# Patient Record
Sex: FEMALE | Race: WHITE | NOT HISPANIC OR LATINO | Employment: OTHER | URBAN - METROPOLITAN AREA
[De-identification: names, ages, dates, MRNs, and addresses within clinical notes are randomized per-mention and may not be internally consistent; named-entity substitution may affect disease eponyms.]

---

## 2018-08-22 ENCOUNTER — TRANSCRIBE ORDERS (OUTPATIENT)
Dept: ADMINISTRATIVE | Facility: HOSPITAL | Age: 73
End: 2018-08-22

## 2018-08-22 DIAGNOSIS — Z12.39 BREAST SCREENING: Primary | ICD-10-CM

## 2018-08-28 ENCOUNTER — HOSPITAL ENCOUNTER (OUTPATIENT)
Dept: MAMMOGRAPHY | Facility: HOSPITAL | Age: 73
Discharge: HOME | End: 2018-08-28
Admitting: GENERAL PRACTICE

## 2018-08-28 ENCOUNTER — HOSPITAL ENCOUNTER (OUTPATIENT)
Dept: BONE DENSITY | Facility: HOSPITAL | Age: 73
Discharge: HOME | End: 2018-08-28

## 2018-08-28 ENCOUNTER — TRANSCRIBE ORDERS (OUTPATIENT)
Dept: ADMINISTRATIVE | Facility: HOSPITAL | Age: 73
End: 2018-08-28

## 2018-08-28 DIAGNOSIS — Z78.0 POST-MENOPAUSAL: ICD-10-CM

## 2018-08-28 DIAGNOSIS — Z12.39 BREAST SCREENING: ICD-10-CM

## 2018-08-28 DIAGNOSIS — Z78.0 POST-MENOPAUSAL: Primary | ICD-10-CM

## 2018-08-28 PROCEDURE — 77067 SCR MAMMO BI INCL CAD: CPT

## 2018-08-28 PROCEDURE — 77080 DXA BONE DENSITY AXIAL: CPT

## 2019-05-14 ENCOUNTER — APPOINTMENT (OUTPATIENT)
Dept: GENERAL RADIOLOGY | Facility: HOSPITAL | Age: 74
End: 2019-05-14

## 2019-05-14 ENCOUNTER — APPOINTMENT (OUTPATIENT)
Dept: CT IMAGING | Facility: HOSPITAL | Age: 74
End: 2019-05-14

## 2019-05-14 ENCOUNTER — HOSPITAL ENCOUNTER (INPATIENT)
Facility: HOSPITAL | Age: 74
LOS: 4 days | Discharge: HOME | End: 2019-05-18
Attending: EMERGENCY MEDICINE | Admitting: HOSPITALIST

## 2019-05-14 ENCOUNTER — APPOINTMENT (OUTPATIENT)
Dept: MRI IMAGING | Facility: HOSPITAL | Age: 74
End: 2019-05-14

## 2019-05-14 DIAGNOSIS — I16.9 HYPERTENSIVE CRISIS: Primary | ICD-10-CM

## 2019-05-14 DIAGNOSIS — I63.9 ACUTE CEREBROVASCULAR ACCIDENT (CVA) OF CEREBELLUM (HCC): ICD-10-CM

## 2019-05-14 PROBLEM — R79.89 ELEVATED TROPONIN: Status: ACTIVE | Noted: 2019-05-14

## 2019-05-14 PROBLEM — E78.5 HYPERLIPIDEMIA: Status: ACTIVE | Noted: 2019-05-14

## 2019-05-14 PROBLEM — I10 HYPERTENSION: Status: ACTIVE | Noted: 2019-05-14

## 2019-05-14 PROBLEM — E11.9 DIABETES MELLITUS (HCC): Status: ACTIVE | Noted: 2019-05-14

## 2019-05-14 PROBLEM — R77.8 ELEVATED TROPONIN: Status: ACTIVE | Noted: 2019-05-14

## 2019-05-14 LAB
ALBUMIN SERPL-MCNC: 4 G/DL (ref 3.5–5.2)
ALBUMIN/GLOB SERPL: 1.3 G/DL
ALP SERPL-CCNC: 104 U/L (ref 39–117)
ALT SERPL W P-5'-P-CCNC: 14 U/L (ref 1–33)
ANION GAP SERPL CALCULATED.3IONS-SCNC: 8.3 MMOL/L
AST SERPL-CCNC: 17 U/L (ref 1–32)
BASOPHILS # BLD AUTO: 0.04 10*3/MM3 (ref 0–0.2)
BASOPHILS NFR BLD AUTO: 0.8 % (ref 0–1.5)
BILIRUB SERPL-MCNC: 0.3 MG/DL (ref 0.2–1.2)
BUN BLD-MCNC: 19 MG/DL (ref 8–23)
BUN/CREAT SERPL: 19.8 (ref 7–25)
CALCIUM SPEC-SCNC: 9.1 MG/DL (ref 8.6–10.5)
CHLORIDE SERPL-SCNC: 97 MMOL/L (ref 98–107)
CO2 SERPL-SCNC: 27.7 MMOL/L (ref 22–29)
CREAT BLD-MCNC: 0.96 MG/DL (ref 0.57–1)
DEPRECATED RDW RBC AUTO: 46.5 FL (ref 37–54)
EOSINOPHIL # BLD AUTO: 0.15 10*3/MM3 (ref 0–0.4)
EOSINOPHIL NFR BLD AUTO: 3 % (ref 0.3–6.2)
ERYTHROCYTE [DISTWIDTH] IN BLOOD BY AUTOMATED COUNT: 14.4 % (ref 12.3–15.4)
GFR SERPL CREATININE-BSD FRML MDRD: 57 ML/MIN/1.73
GLOBULIN UR ELPH-MCNC: 3 GM/DL
GLUCOSE BLD-MCNC: 116 MG/DL (ref 65–99)
HCT VFR BLD AUTO: 35.3 % (ref 34–46.6)
HGB BLD-MCNC: 11.2 G/DL (ref 12–15.9)
HOLD SPECIMEN: NORMAL
HOLD SPECIMEN: NORMAL
IMM GRANULOCYTES # BLD AUTO: 0.01 10*3/MM3 (ref 0–0.05)
IMM GRANULOCYTES NFR BLD AUTO: 0.2 % (ref 0–0.5)
LYMPHOCYTES # BLD AUTO: 1.28 10*3/MM3 (ref 0.7–3.1)
LYMPHOCYTES NFR BLD AUTO: 25.4 % (ref 19.6–45.3)
MCH RBC QN AUTO: 27.7 PG (ref 26.6–33)
MCHC RBC AUTO-ENTMCNC: 31.7 G/DL (ref 31.5–35.7)
MCV RBC AUTO: 87.2 FL (ref 79–97)
MONOCYTES # BLD AUTO: 0.41 10*3/MM3 (ref 0.1–0.9)
MONOCYTES NFR BLD AUTO: 8.2 % (ref 5–12)
NEUTROPHILS # BLD AUTO: 3.14 10*3/MM3 (ref 1.7–7)
NEUTROPHILS NFR BLD AUTO: 62.4 % (ref 42.7–76)
NRBC BLD AUTO-RTO: 0.2 /100 WBC (ref 0–0.2)
PLATELET # BLD AUTO: 240 10*3/MM3 (ref 140–450)
PMV BLD AUTO: 11 FL (ref 6–12)
POTASSIUM BLD-SCNC: 3.9 MMOL/L (ref 3.5–5.2)
PROT SERPL-MCNC: 7 G/DL (ref 6–8.5)
RBC # BLD AUTO: 4.05 10*6/MM3 (ref 3.77–5.28)
SODIUM BLD-SCNC: 133 MMOL/L (ref 136–145)
TROPONIN T SERPL-MCNC: 0.33 NG/ML (ref 0–0.03)
WBC NRBC COR # BLD: 5.03 10*3/MM3 (ref 3.4–10.8)
WHOLE BLOOD HOLD SPECIMEN: NORMAL
WHOLE BLOOD HOLD SPECIMEN: NORMAL

## 2019-05-14 PROCEDURE — 99285 EMERGENCY DEPT VISIT HI MDM: CPT

## 2019-05-14 PROCEDURE — 85025 COMPLETE CBC W/AUTO DIFF WBC: CPT | Performed by: PHYSICIAN ASSISTANT

## 2019-05-14 PROCEDURE — 93005 ELECTROCARDIOGRAM TRACING: CPT | Performed by: PHYSICIAN ASSISTANT

## 2019-05-14 PROCEDURE — 93010 ELECTROCARDIOGRAM REPORT: CPT | Performed by: INTERNAL MEDICINE

## 2019-05-14 PROCEDURE — 70553 MRI BRAIN STEM W/O & W/DYE: CPT

## 2019-05-14 PROCEDURE — A9577 INJ MULTIHANCE: HCPCS | Performed by: INTERNAL MEDICINE

## 2019-05-14 PROCEDURE — 70450 CT HEAD/BRAIN W/O DYE: CPT

## 2019-05-14 PROCEDURE — 80053 COMPREHEN METABOLIC PANEL: CPT | Performed by: PHYSICIAN ASSISTANT

## 2019-05-14 PROCEDURE — 82607 VITAMIN B-12: CPT | Performed by: NURSE PRACTITIONER

## 2019-05-14 PROCEDURE — 71045 X-RAY EXAM CHEST 1 VIEW: CPT

## 2019-05-14 PROCEDURE — 70549 MR ANGIOGRAPH NECK W/O&W/DYE: CPT

## 2019-05-14 PROCEDURE — 0 GADOBENATE DIMEGLUMINE 529 MG/ML SOLUTION: Performed by: INTERNAL MEDICINE

## 2019-05-14 PROCEDURE — 70544 MR ANGIOGRAPHY HEAD W/O DYE: CPT

## 2019-05-14 PROCEDURE — 84484 ASSAY OF TROPONIN QUANT: CPT | Performed by: PHYSICIAN ASSISTANT

## 2019-05-14 RX ORDER — LOSARTAN POTASSIUM AND HYDROCHLOROTHIAZIDE 12.5; 1 MG/1; MG/1
1 TABLET ORAL DAILY
COMMUNITY
End: 2019-05-18 | Stop reason: HOSPADM

## 2019-05-14 RX ORDER — ASPIRIN 325 MG
325 TABLET ORAL ONCE
Status: COMPLETED | OUTPATIENT
Start: 2019-05-14 | End: 2019-05-14

## 2019-05-14 RX ORDER — ATORVASTATIN CALCIUM 20 MG/1
20 TABLET, FILM COATED ORAL DAILY
COMMUNITY
End: 2019-05-18 | Stop reason: HOSPADM

## 2019-05-14 RX ORDER — ASPIRIN 325 MG
325 TABLET ORAL DAILY
Status: DISCONTINUED | OUTPATIENT
Start: 2019-05-15 | End: 2019-05-15

## 2019-05-14 RX ORDER — BRIMONIDINE TARTRATE 2 MG/ML
1 SOLUTION/ DROPS OPHTHALMIC 2 TIMES DAILY
COMMUNITY

## 2019-05-14 RX ORDER — ASPIRIN 300 MG/1
300 SUPPOSITORY RECTAL DAILY
Status: DISCONTINUED | OUTPATIENT
Start: 2019-05-15 | End: 2019-05-15

## 2019-05-14 RX ORDER — BRIMONIDINE TARTRATE 2 MG/ML
1 SOLUTION/ DROPS OPHTHALMIC 2 TIMES DAILY
Status: DISCONTINUED | OUTPATIENT
Start: 2019-05-14 | End: 2019-05-18 | Stop reason: HOSPADM

## 2019-05-14 RX ORDER — ONDANSETRON 2 MG/ML
4 INJECTION INTRAMUSCULAR; INTRAVENOUS EVERY 6 HOURS PRN
Status: DISCONTINUED | OUTPATIENT
Start: 2019-05-14 | End: 2019-05-18 | Stop reason: HOSPADM

## 2019-05-14 RX ORDER — DORZOLAMIDE HCL 20 MG/ML
1 SOLUTION/ DROPS OPHTHALMIC 2 TIMES DAILY
COMMUNITY

## 2019-05-14 RX ORDER — DORZOLAMIDE HCL 20 MG/ML
1 SOLUTION/ DROPS OPHTHALMIC 2 TIMES DAILY
Status: DISCONTINUED | OUTPATIENT
Start: 2019-05-14 | End: 2019-05-18 | Stop reason: HOSPADM

## 2019-05-14 RX ORDER — SODIUM CHLORIDE 0.9 % (FLUSH) 0.9 %
3 SYRINGE (ML) INJECTION EVERY 12 HOURS SCHEDULED
Status: DISCONTINUED | OUTPATIENT
Start: 2019-05-14 | End: 2019-05-18 | Stop reason: HOSPADM

## 2019-05-14 RX ORDER — SODIUM CHLORIDE 9 MG/ML
75 INJECTION, SOLUTION INTRAVENOUS CONTINUOUS
Status: DISCONTINUED | OUTPATIENT
Start: 2019-05-14 | End: 2019-05-18 | Stop reason: HOSPADM

## 2019-05-14 RX ORDER — NEBIVOLOL 20 MG/1
20 TABLET ORAL DAILY
COMMUNITY
End: 2019-08-29 | Stop reason: HOSPADM

## 2019-05-14 RX ORDER — NEBIVOLOL 10 MG/1
20 TABLET ORAL DAILY
Status: DISCONTINUED | OUTPATIENT
Start: 2019-05-14 | End: 2019-05-18 | Stop reason: HOSPADM

## 2019-05-14 RX ORDER — GLIPIZIDE 10 MG/1
10 TABLET ORAL
COMMUNITY
End: 2019-08-29 | Stop reason: HOSPADM

## 2019-05-14 RX ORDER — SODIUM CHLORIDE 0.9 % (FLUSH) 0.9 %
3-10 SYRINGE (ML) INJECTION AS NEEDED
Status: DISCONTINUED | OUTPATIENT
Start: 2019-05-14 | End: 2019-05-18 | Stop reason: HOSPADM

## 2019-05-14 RX ORDER — ATORVASTATIN CALCIUM 20 MG/1
20 TABLET, FILM COATED ORAL NIGHTLY
Status: DISCONTINUED | OUTPATIENT
Start: 2019-05-14 | End: 2019-05-16

## 2019-05-14 RX ORDER — PIOGLITAZONEHYDROCHLORIDE 45 MG/1
45 TABLET ORAL DAILY
COMMUNITY
End: 2019-05-22 | Stop reason: HOSPADM

## 2019-05-14 RX ORDER — NEBIVOLOL 10 MG/1
10 TABLET ORAL DAILY
COMMUNITY
End: 2019-05-14

## 2019-05-14 RX ORDER — LOSARTAN POTASSIUM 25 MG/1
12.5 TABLET ORAL DAILY
COMMUNITY
End: 2019-05-14

## 2019-05-14 RX ADMIN — SODIUM CHLORIDE, PRESERVATIVE FREE 3 ML: 5 INJECTION INTRAVENOUS at 23:29

## 2019-05-14 RX ADMIN — SODIUM CHLORIDE 75 ML/HR: 9 INJECTION, SOLUTION INTRAVENOUS at 23:02

## 2019-05-14 RX ADMIN — ASPIRIN 325 MG: 325 TABLET ORAL at 18:23

## 2019-05-14 RX ADMIN — SODIUM CHLORIDE 5 MG/HR: 9 INJECTION, SOLUTION INTRAVENOUS at 18:46

## 2019-05-14 RX ADMIN — BRIMONIDINE TARTRATE 1 DROP: 2 SOLUTION OPHTHALMIC at 23:04

## 2019-05-14 RX ADMIN — DORZOLAMIDE HYDROCHLORIDE 1 DROP: 20 SOLUTION/ DROPS OPHTHALMIC at 23:04

## 2019-05-14 RX ADMIN — GADOBENATE DIMEGLUMINE 13 ML: 529 INJECTION, SOLUTION INTRAVENOUS at 22:34

## 2019-05-14 RX ADMIN — SODIUM CHLORIDE 5 MG/HR: 9 INJECTION, SOLUTION INTRAVENOUS at 23:02

## 2019-05-14 RX ADMIN — ATORVASTATIN CALCIUM 20 MG: 20 TABLET, FILM COATED ORAL at 23:04

## 2019-05-14 NOTE — PROGRESS NOTES
Clinical Pharmacy Services: Medication History    Landy Workman is a 74 y.o. female presenting to [facility] for   Chief Complaint   Patient presents with   • Blurred Vision       She  has a past medical history of Diabetes mellitus (CMS/[facility]), Hyperlipidemia, and Hypertension.    Allergies as of 05/14/2019   • (No Known Allergies)       Medication information was obtained from: Patient, pharmacy  Pharmacy and Phone Number: [facility] [phone]    Prior to Admission Medications     Prescriptions Last Dose Informant Patient Reported? Taking?    atorvastatin (LIPITOR) 20 MG tablet  Pharmacy Yes Yes    Take 20 mg by mouth Daily.    brimonidine (ALPHAGAN) 0.2 % ophthalmic solution  Pharmacy Yes Yes    Administer 1 drop to both eyes 2 (Two) Times a Day.    dorzolamide (TRUSOPT) 2 % ophthalmic solution  Pharmacy Yes Yes    Administer 1 drop to both eyes 2 (Two) Times a Day.    glipiZIDE (GLUCOTROL) 10 MG tablet  Pharmacy Yes Yes    Take 10 mg by mouth 2 (Two) Times a Day Before Meals.    losartan-hydrochlorothiazide (HYZAAR) 100-12.5 MG per tablet  Pharmacy Yes Yes    Take 1 tablet by mouth Daily.    metFORMIN (GLUCOPHAGE) 1000 MG tablet  Pharmacy Yes Yes    Take 1,000 mg by mouth 2 (Two) Times a Day With Meals.    nebivolol (BYSTOLIC) 20 MG tablet  Pharmacy Yes Yes    Take 20 mg by mouth Daily.    pioglitazone (ACTOS) 45 MG tablet  Pharmacy Yes Yes    Take 45 mg by mouth Daily.            Medication notes: Dorzolamide and Brimonidine added per patient and pharmacy records    This medication list is complete to the best of my knowledge as of 5/14/2019    Please call if questions.    Mee Montes, Medication History Technician  5/14/2019 6:30 PM

## 2019-05-14 NOTE — ED PROVIDER NOTES
"EMERGENCY DEPARTMENT ENCOUNTER    Room Number:  26/26  Date seen:  5/14/2019  Time seen: 4:51 PM  PCP: Braulio Beyer MD    HPI:  Chief complaint: blurred vision in L eye   Context:Landy Workman is a 74 y.o. female who presents to the ED with c/o blurred vision in the L eye that began Saturday. Pt reports she was reading the newspaper and she could not read it clearly. Pt states \"there is a blob in my L eye\". No R visual changes. Pt reports she went to her ophthalmologist today and was sent for further evaluation due to HTN and having had \"a stroke in my eye\".  Pt denies CP and SOA. Pt has had surgeries in the past on eyes bilaterally. No prior hx of stroke. Hx of HTN. There are no other complaints at this time.     Onset: gradual  Location:L eye   Radiation: none   Duration: 3 days   Timing: constant   Character: \"there is a blob in my L eye\"  Aggravating Factors: reading   Alleviating Factors: none mentioned   Severity: moderate     ALLERGIES  Patient has no known allergies.    PAST MEDICAL HISTORY  Active Ambulatory Problems     Diagnosis Date Noted   • No Active Ambulatory Problems     Resolved Ambulatory Problems     Diagnosis Date Noted   • No Resolved Ambulatory Problems     Past Medical History:   Diagnosis Date   • Diabetes mellitus (CMS/HCC)    • Hyperlipidemia    • Hypertension        PAST SURGICAL HISTORY  History reviewed. No pertinent surgical history.    FAMILY HISTORY  Family History   Problem Relation Age of Onset   • Breast cancer Neg Hx        SOCIAL HISTORY  Social History     Socioeconomic History   • Marital status:      Spouse name: Not on file   • Number of children: Not on file   • Years of education: Not on file   • Highest education level: Not on file   Tobacco Use   • Smoking status: Never Smoker   • Smokeless tobacco: Never Used   Substance and Sexual Activity   • Alcohol use: No     Frequency: Never   • Drug use: No       REVIEW OF SYSTEMS  Review of Systems "   Constitutional: Negative for chills and fever.   HENT: Negative.    Eyes: Positive for visual disturbance (blurred vision in L eye).   Respiratory: Negative for shortness of breath.    Cardiovascular: Negative for chest pain.   Gastrointestinal: Negative for abdominal pain.   Genitourinary: Negative.    Musculoskeletal: Negative.    Skin: Negative.    Neurological: Negative.    Psychiatric/Behavioral: Negative.        PHYSICAL EXAM  ED Triage Vitals   Temp Heart Rate Resp BP SpO2   05/14/19 1635 05/14/19 1635 05/14/19 1635 05/14/19 1648 05/14/19 1635   98.4 °F (36.9 °C) 74 16 (!) 225/102 99 %      Temp src Heart Rate Source Patient Position BP Location FiO2 (%)   05/14/19 1635 -- -- -- --   Tympanic         Physical Exam   Constitutional: She is oriented to person, place, and time and well-developed, well-nourished, and in no distress.   HENT:   Head: Normocephalic and atraumatic.   Right Ear: External ear normal.   Left Ear: External ear normal.   Nose: Nose normal.   Eyes: Conjunctivae are normal.   Post surgical changes to bilateral eyes   Neck: Normal range of motion.   Cardiovascular: Normal rate and regular rhythm.   Pulmonary/Chest: Effort normal and breath sounds normal.   Abdominal: Soft. There is no tenderness. There is no rebound and no guarding.   Normal bowel sounds  Soft  Nontender  Nondistended  No rebound, guarding, or rigidity  No appreciable organomegaly  No CVA tenderness   Musculoskeletal: Normal range of motion.   Neurological: She is alert and oriented to person, place, and time.   GCS is 15  Cranial nerves II-XII are intact.  No facial droop. Uvula is midline. No tongue deviation. PERRL and EOMI. There is no dysarthria, aphasia or slurred speech.  Sensation is intact to light touch bilaterally and is symmetrical in the face, BUE and BLE.  Strength is 5/5 and symmetrical in the BUE and BLE.  Finger to nose, heel to shin, and rapid alternating movements are intact.  Gait is steady.   Skin:  Skin is warm and dry.   Psychiatric: Affect normal.   Nursing note and vitals reviewed.      LAB RESULTS  Recent Results (from the past 24 hour(s))   Light Blue Top    Collection Time: 05/14/19  5:01 PM   Result Value Ref Range    Extra Tube hold for add-on    Green Top (Gel)    Collection Time: 05/14/19  5:01 PM   Result Value Ref Range    Extra Tube Hold for add-ons.    Lavender Top    Collection Time: 05/14/19  5:01 PM   Result Value Ref Range    Extra Tube hold for add-on    Gold Top - SST    Collection Time: 05/14/19  5:01 PM   Result Value Ref Range    Extra Tube Hold for add-ons.    Comprehensive Metabolic Panel    Collection Time: 05/14/19  5:01 PM   Result Value Ref Range    Glucose 116 (H) 65 - 99 mg/dL    BUN 19 8 - 23 mg/dL    Creatinine 0.96 0.57 - 1.00 mg/dL    Sodium 133 (L) 136 - 145 mmol/L    Potassium 3.9 3.5 - 5.2 mmol/L    Chloride 97 (L) 98 - 107 mmol/L    CO2 27.7 22.0 - 29.0 mmol/L    Calcium 9.1 8.6 - 10.5 mg/dL    Total Protein 7.0 6.0 - 8.5 g/dL    Albumin 4.00 3.50 - 5.20 g/dL    ALT (SGPT) 14 1 - 33 U/L    AST (SGOT) 17 1 - 32 U/L    Alkaline Phosphatase 104 39 - 117 U/L    Total Bilirubin 0.3 0.2 - 1.2 mg/dL    eGFR Non African Amer 57 (L) >60 mL/min/1.73    Globulin 3.0 gm/dL    A/G Ratio 1.3 g/dL    BUN/Creatinine Ratio 19.8 7.0 - 25.0    Anion Gap 8.3 mmol/L   Troponin    Collection Time: 05/14/19  5:01 PM   Result Value Ref Range    Troponin T 0.330 (C) 0.000 - 0.030 ng/mL   CBC Auto Differential    Collection Time: 05/14/19  5:01 PM   Result Value Ref Range    WBC 5.03 3.40 - 10.80 10*3/mm3    RBC 4.05 3.77 - 5.28 10*6/mm3    Hemoglobin 11.2 (L) 12.0 - 15.9 g/dL    Hematocrit 35.3 34.0 - 46.6 %    MCV 87.2 79.0 - 97.0 fL    MCH 27.7 26.6 - 33.0 pg    MCHC 31.7 31.5 - 35.7 g/dL    RDW 14.4 12.3 - 15.4 %    RDW-SD 46.5 37.0 - 54.0 fl    MPV 11.0 6.0 - 12.0 fL    Platelets 240 140 - 450 10*3/mm3    Neutrophil % 62.4 42.7 - 76.0 %    Lymphocyte % 25.4 19.6 - 45.3 %    Monocyte % 8.2 5.0  - 12.0 %    Eosinophil % 3.0 0.3 - 6.2 %    Basophil % 0.8 0.0 - 1.5 %    Immature Grans % 0.2 0.0 - 0.5 %    Neutrophils, Absolute 3.14 1.70 - 7.00 10*3/mm3    Lymphocytes, Absolute 1.28 0.70 - 3.10 10*3/mm3    Monocytes, Absolute 0.41 0.10 - 0.90 10*3/mm3    Eosinophils, Absolute 0.15 0.00 - 0.40 10*3/mm3    Basophils, Absolute 0.04 0.00 - 0.20 10*3/mm3    Immature Grans, Absolute 0.01 0.00 - 0.05 10*3/mm3    nRBC 0.2 0.0 - 0.2 /100 WBC       I ordered the above labs and reviewed the results    RADIOLOGY  XR Chest 1 View         CT Head Without Contrast   Preliminary Result   1. Evidence of old ischemic disease.   2. No acute process identified.       Radiation dose reduction techniques were utilized, including automated   exposure control and exposure modulation based on body size.                  I ordered the above noted radiological studies and reviewed the images on the PACS system.      MEDICATIONS GIVEN IN ER  Medications   niCARdipine (CARDENE) 25 mg/250 mL (0.1 mg/mL) NS infusion kit (5 mg/hr Intravenous New Bag 5/14/19 1846)   aspirin tablet 325 mg (325 mg Oral Given 5/14/19 1823)         PROCEDURES  Critical Care  Performed by: Loren Castillo PA  Authorized by: Roge Jain MD     Critical care provider statement:     Critical care time (minutes):  35    Critical care time was exclusive of:  Separately billable procedures and treating other patients    Critical care was necessary to treat or prevent imminent or life-threatening deterioration of the following conditions:  Cardiac failure, CNS failure or compromise and renal failure    Critical care was time spent personally by me on the following activities:  Blood draw for specimens, development of treatment plan with patient or surrogate, discussions with consultants, discussions with primary provider, evaluation of patient's response to treatment, examination of patient, interpretation of cardiac output measurements, obtaining history from  patient or surrogate, ordering and performing treatments and interventions, ordering and review of laboratory studies, ordering and review of radiographic studies, pulse oximetry, re-evaluation of patient's condition and review of old charts    I assumed direction of critical care for this patient from another provider in my specialty: yes            Interval: baseline  1a. Level of Consciousness: 0-->Alert, keenly responsive  1b. LOC Questions: 0-->Answers both questions correctly  1c. LOC Commands: 0-->Performs both tasks correctly  2. Best Gaze: 0-->Normal  3. Visual: 0-->No visual loss  4. Facial Palsy: 0-->Normal symmetrical movements  5a. Motor Arm, Left: 0-->No drift, limb holds 90 (or 45) degrees for full 10 secs  5b. Motor Arm, Right: 0-->No drift, limb holds 90 (or 45) degrees for full 10 secs  6a. Motor Leg, Left: 0-->No drift, leg holds 30 degree position for full 5 secs  6b. Motor Leg, Right: 0-->No drift, leg holds 30 degree position for full 5 secs  7. Limb Ataxia: 0-->Absent  8. Sensory: 0-->Normal, no sensory loss  9. Best Language: 0-->No aphasia, normal  10. Dysarthria: 0-->Normal  11. Extinction and Inattention (formerly Neglect): 0-->No abnormality    Total (NIH Stroke Scale): 0    PROGRESS AND CONSULTS    Progress Notes:       1708  Ordered CXR, CT head, and troponin for further evaluation.    1720  Reviewed pt's history and workup with Dr. Jain. After a bedside evaluation; Dr. Jain agrees with the plan of care.    1800  Troponin is 0.330.    1809  Rechecked pt. Pt is resting comfortably. BP is 210/95. HR is in the 60's. Notified pt of lab results and imaging which revealed elevated troponin. Discussed the plan to admit for observation and further treatment. Pt understands and agrees with the plan, all questions answered.    1814  Placed a call to G, A, and inpatient neurology.     1815  Ordered ASA tablet.     1818  Discussed pt case with Dr. Christy (inpatient neurology) who agrees  "to consult pt.     1822  Ordered Cardene drip for HTN and chest pain.     1829  Discussed pt case with Dr. Wilder ([facility]) who agrees to admit pt.     1831  Discussed pt case with Dr. Galvez ([facility]) who agrees to consult and agrees with the Cardene drip.     Disposition vitals:  BP (!) 213/92   Pulse 63   Temp 98.4 °F (36.9 °C) (Tympanic)   Resp 18   Ht 165.1 cm (65\")   Wt 66.5 kg (146 lb 9.6 oz)   SpO2 97%   BMI 24.40 kg/m²       DIAGNOSIS  Final diagnoses:   Hypertensive crisis       Documentation assistance provided by gabriela Lo for Loren Castillo PA-C.  Information recorded by the scribe was done at my direction and has been verified and validated by me.           Ana Lo  05/14/19 1912       Loren Castillo PA  05/14/19 [id number]    "

## 2019-05-14 NOTE — ED TRIAGE NOTES
Patient to ED for blurred vision left eye and dizziness onset Saturday, patient saw eye doctor today and sent for evaluation of /90. Patient reports blurred vision improving. No other neuro deficits noted

## 2019-05-14 NOTE — ED PROVIDER NOTES
Pt presents to the ED c/o blurry vision in her left eye that began 2 days ago. Pt reports associated left sided headache at the onset of the blurred vision. Pt states that she was she was seen by her ophthalmologist, and was referred to the ED for hypertension. BP on arrival to the ED is 225/102. She denies associated chest pain, SOA, or any other sx. On exam, Pt is resting comfortably, in no distress, and without focal neurologic deficit. Cardiovascular exam is within normal limits and without murmur. Lungs are CTAB. Plan for labs, CT head, and EKG.      Attestation:  The SOLANGE and I have discussed this patient's history, physical exam, and treatment plan.  I have reviewed the documentation and personally had a face to face interaction with the patient. I affirm the documentation and agree with the treatment and plan.  The attached note describes my personal findings.      Documentation assistance provided by gabriela Roa for Dr Jain Information recorded by the scribe was done at my direction and has been verified and validated by me.     Michelle Roa  05/14/19 5929       Roge Jain MD  05/16/19 [id number]

## 2019-05-14 NOTE — H&P
Internal medicine history and physical  INTERNAL MEDICINE   [facility]       Patient Identification:  Name: Landy Workman  Age: 74 y.o.  Sex: female  :  1945  MRN: [id number]                   Primary Care Physician: Braulio Beyer MD                                   Chief Complaint: Sent from ophthalmologist office for evaluation of ischemic stroke involving left field of vision.    History of Present Illness:   Patient is a 74-year-old female with past medical history of diabetes hypertension dyslipidemia was in her usual state of her health when she went to bed on Friday night.  She woke up Saturday morning feeling otherwise fine and only when she started reading the newspaper noticed that certain part of her field of vision in the left eye is covered and blocked.  She denies any blurry vision or lack of focus as the problem is just part of the newspaper she could not see in that area of her left eye.  She dealt with it throughout the weekend and Monday and it did get any better she decided to call her ophthalmologist and made an appointment and was seen earlier today.  According to the patient after thorough examination she was told that she had a ischemic stroke involving her left eye and she needs to go to the emergency room.  Patient arrived at the emergency room and was found to have significantly elevated blood pressure and deficit in the left field of vision during examination.  She was not found to have any other focal neurological deficits.  Initial CT scan did not show any acute abnormalities and patient is being admitted for control of her blood pressure as well as further work-up.      Past Medical History:  Past Medical History:   Diagnosis Date   • Diabetes mellitus (CMS/[facility])    • Hyperlipidemia    • Hypertension      Past Surgical History:  History reviewed. No pertinent surgical history.   Home Meds:  Medications Prior to Admission   Medication Sig Dispense  "Refill Last Dose   • atorvastatin (LIPITOR) 20 MG tablet Take 20 mg by mouth Daily.      • brimonidine (ALPHAGAN) 0.2 % ophthalmic solution Administer 1 drop to both eyes 2 (Two) Times a Day.      • dorzolamide (TRUSOPT) 2 % ophthalmic solution Administer 1 drop to both eyes 2 (Two) Times a Day.      • glipiZIDE (GLUCOTROL) 10 MG tablet Take 10 mg by mouth 2 (Two) Times a Day Before Meals.      • losartan-hydrochlorothiazide (HYZAAR) 100-12.5 MG per tablet Take 1 tablet by mouth Daily.      • metFORMIN (GLUCOPHAGE) 1000 MG tablet Take 1,000 mg by mouth 2 (Two) Times a Day With Meals.      • nebivolol (BYSTOLIC) 20 MG tablet Take 20 mg by mouth Daily.      • pioglitazone (ACTOS) 45 MG tablet Take 45 mg by mouth Daily.        Current Meds:     Current Facility-Administered Medications:   •  niCARdipine (CARDENE) 25 mg/250 mL (0.1 mg/mL) NS infusion kit, 5-15 mg/hr, Intravenous, Titrated, Loren Castillo PA, Last Rate: 75 mL/hr at 05/14/19 1912, 7.5 mg/hr at 05/14/19 1912  Allergies:  No Known Allergies  Social History:   Social History     Tobacco Use   • Smoking status: Never Smoker   • Smokeless tobacco: Never Used   Substance Use Topics   • Alcohol use: No     Frequency: Never      Family History:  Family History   Problem Relation Age of Onset   • Breast cancer Neg Hx           Review of Systems  See history of present illness and past medical history.  Constitutional: Negative for chills and fever.   HENT: Negative.    Eyes: Positive for visual disturbance  as if part of her vision is blocked.  Respiratory: Negative for shortness of breath.    Cardiovascular: Negative for chest pain.   Gastrointestinal: Negative for abdominal pain.   Genitourinary: Negative.    Musculoskeletal: Negative.    Skin: Negative.    Neurological: Negative.    Psychiatric/Behavioral: Negative.        Vitals:   /74   Pulse 69   Temp 98.4 °F (36.9 °C) (Tympanic)   Resp 18   Ht 165.1 cm (65\")   Wt 66.5 kg (146 lb 9.6 oz)   SpO2 " 97%   BMI 24.40 kg/m²   I/O: No intake or output data in the 24 hours ending 05/14/19 1955  Exam:  General Appearance:    Alert, cooperative, no distress, appears stated age   Head:    Normocephalic, without obvious abnormality, atraumatic   Eyes:    PERRL, conjunctiva/corneas clear, EOM's intact, both eyes   Ears:    Normal external ear canals, both ears   Nose:   Nares normal, septum midline, mucosa normal, no drainage    or sinus tenderness   Throat:   Lips, tongue, gums normal; oral mucosa pink and moist   Neck:   Supple, symmetrical, trachea midline, no adenopathy;     thyroid:  no enlargement/tenderness/nodules; no carotid    bruit or JVD   Back:     Symmetric, no curvature, ROM normal, no CVA tenderness   Lungs:     Clear to auscultation bilaterally, respirations unlabored   Chest Wall:    No tenderness or deformity    Heart:    Regular rate and rhythm, S1 and S2 normal, no murmur, rub   or gallop   Abdomen:     Soft, non-tender, bowel sounds active all four quadrants,     no masses, no hepatomegaly, no splenomegaly   Extremities:   Extremities normal, atraumatic, no cyanosis or edema   Pulses:   Pulses palpable in all extremities; symmetric all extremities   Skin:   Skin color normal, Skin is warm and dry,  no rashes or palpable lesions   Neurologic:   CNII-XII intact except during the confrontational examination the lower left field of vision in the left eye shows a large blind spot for her, motor strength grossly intact, sensation grossly intact to light touch, no focal deficits noted       Data Review:      I reviewed the patient's new clinical results.  Results from last 7 days   Lab Units 05/14/19  1701   WBC 10*3/mm3 5.03   HEMOGLOBIN g/dL 11.2*   PLATELETS 10*3/mm3 240     Results from last 7 days   Lab Units 05/14/19  1701   SODIUM mmol/L 133*   POTASSIUM mmol/L 3.9   CHLORIDE mmol/L 97*   CO2 mmol/L 27.7   BUN mg/dL 19   CREATININE mg/dL 0.96   CALCIUM mg/dL 9.1   GLUCOSE mg/dL 116*   Ct Head  Without Contrast    Result Date: 5/14/2019  1. Evidence of old ischemic disease. 2. No acute process identified.  Radiation dose reduction techniques were utilized, including automated exposure control and exposure modulation based on body size.  This report was finalized on 5/14/2019 8:34 PM by Dr. Stuart Dubois M.D.    ECG 12 Lead   Final Result   HEART RATE= 63  bpm   RR Interval= 952  ms   VA Interval= 192  ms   P Horizontal Axis= -10  deg   P Front Axis= 54  deg   QRSD Interval= 108  ms   QT Interval= 439  ms   QRS Axis= 1  deg   T Wave Axis= 27  deg   - NORMAL ECG -   Sinus rhythm   NO PRIOR TRACING AVAILABLE FOR COMPARISON   Electronically Signed By: Kang Pastrana ([facility]) 14-May-2019 22:03:02   Date and Time of Study: 2019-05-14 17:27:23                  Assessment:  Active Hospital Problems    Diagnosis POA   • **Hypertensive crisis [I16.9] Yes   • Diabetes mellitus (CMS/HCC) [E11.9] Unknown   • Hyperlipidemia [E78.5] Unknown   • Hypertension [I10] Unknown   • Elevated troponin [R74.8] Unknown       Medical decision making:  Probable ischemic TIA/CVA involving posterior circulation affecting the probably the right occipital lobe-plan is to admit the patient continue with aspirin and statin.  Get MRI MRA of head and neck vessels and neurology and cardiology consultation.  Check 2D echo with Doppler.  Provide with neurochecks.  Uncontrolled hypertension/hypertensive crisis-patient recalls disruption of her medications schedule because of her visual field defects and reaction to it.  She missed the dose on Saturday and took the medicine late on Sunday and not sure whether she took all of her medications today.  Plan is to cautiously control her blood pressure and allow for permissive hypertension in the first 24 hours.  Use Cardene drip which was started in the emergency room only to keep the systolic blood pressure less than 220 or above 180 in the first 24 hours.  Diabetes mellitus-continue with  Accu-Cheks and sliding scale coverage check hemoglobin A1c hold oral hypoglycemic agents until further work-up and her oral intake is complete.  Elevated troponin with no symptom and EKG changes likely due to cerebral ischemia but primary cardiac process with his non-ST segment elevation MI could very well be underlying culprit given the fact that she is diabetic.  Since there is a strong suspicion for possibility of stroke any more anticoagulation treatment in this asymptomatic patient with normal EKG besides aspirin is very difficult.  Will get cardiology consultation in the meantime continue with aspirin and statins.  Check serial troponin.  Dyslipidemia-continue her home statin.  Check fasting lipid profile.    Faizan Wilder MD   5/14/2019  7:55 PM  Much of this encounter note is an electronic transcription/translation of spoken language to printed text. The electronic translation of spoken language may permit erroneous, or at times, nonsensical words or phrases to be inadvertently transcribed; Although I have reviewed the note for such errors, some may still exist

## 2019-05-15 ENCOUNTER — APPOINTMENT (OUTPATIENT)
Dept: CARDIOLOGY | Facility: HOSPITAL | Age: 74
End: 2019-05-15

## 2019-05-15 PROBLEM — I63.9 ACUTE CEREBROVASCULAR ACCIDENT (CVA) OF CEREBELLUM: Status: ACTIVE | Noted: 2019-05-15

## 2019-05-15 LAB
ALBUMIN SERPL-MCNC: 3.5 G/DL (ref 3.5–5.2)
ALBUMIN/GLOB SERPL: 1.3 G/DL
ALP SERPL-CCNC: 89 U/L (ref 39–117)
ALT SERPL W P-5'-P-CCNC: 10 U/L (ref 1–33)
ANION GAP SERPL CALCULATED.3IONS-SCNC: 8.2 MMOL/L
AORTIC DIMENSIONLESS INDEX: 0.5 (DI)
APTT PPP: 30.7 SECONDS (ref 22.7–35.4)
APTT PPP: 42.5 SECONDS (ref 22.7–35.4)
AST SERPL-CCNC: 17 U/L (ref 1–32)
BASOPHILS # BLD AUTO: 0.04 10*3/MM3 (ref 0–0.2)
BASOPHILS NFR BLD AUTO: 0.8 % (ref 0–1.5)
BH CV ECHO MEAS - ACS: 1.7 CM
BH CV ECHO MEAS - AI DEC SLOPE: 249.5 CM/SEC^2
BH CV ECHO MEAS - AI MAX PG: 73.4 MMHG
BH CV ECHO MEAS - AI MAX VEL: 428.5 CM/SEC
BH CV ECHO MEAS - AI P1/2T: 503 MSEC
BH CV ECHO MEAS - AO MEAN PG (FULL): 6 MMHG
BH CV ECHO MEAS - AO MEAN PG: 8 MMHG
BH CV ECHO MEAS - AO V2 MAX: 175 CM/SEC
BH CV ECHO MEAS - AO V2 MEAN: 132 CM/SEC
BH CV ECHO MEAS - AO V2 VTI: 45 CM
BH CV ECHO MEAS - AVA(I,A): 1.2 CM^2
BH CV ECHO MEAS - AVA(I,D): 1.2 CM^2
BH CV ECHO MEAS - BSA(HAYCOCK): 1.8 M^2
BH CV ECHO MEAS - BSA: 1.7 M^2
BH CV ECHO MEAS - BZI_BMI: 24.3 KILOGRAMS/M^2
BH CV ECHO MEAS - BZI_METRIC_HEIGHT: 165.1 CM
BH CV ECHO MEAS - BZI_METRIC_WEIGHT: 66.2 KG
BH CV ECHO MEAS - EDV(CUBED): 166.4 ML
BH CV ECHO MEAS - EDV(MOD-SP2): 87 ML
BH CV ECHO MEAS - EDV(MOD-SP4): 84 ML
BH CV ECHO MEAS - EDV(TEICH): 147.4 ML
BH CV ECHO MEAS - EF(CUBED): 67 %
BH CV ECHO MEAS - EF(MOD-BP): 54 %
BH CV ECHO MEAS - EF(MOD-SP2): 52.9 %
BH CV ECHO MEAS - EF(MOD-SP4): 54.8 %
BH CV ECHO MEAS - EF(TEICH): 58 %
BH CV ECHO MEAS - ESV(CUBED): 54.9 ML
BH CV ECHO MEAS - ESV(MOD-SP2): 41 ML
BH CV ECHO MEAS - ESV(MOD-SP4): 38 ML
BH CV ECHO MEAS - ESV(TEICH): 62 ML
BH CV ECHO MEAS - FS: 30.9 %
BH CV ECHO MEAS - IVS/LVPW: 1.2
BH CV ECHO MEAS - IVSD: 1.2 CM
BH CV ECHO MEAS - LA DIMENSION: 5 CM
BH CV ECHO MEAS - LAT PEAK E' VEL: 4 CM/SEC
BH CV ECHO MEAS - LV DIASTOLIC VOL/BSA (35-75): 48.5 ML/M^2
BH CV ECHO MEAS - LV MASS(C)D: 242 GRAMS
BH CV ECHO MEAS - LV MASS(C)DI: 139.9 GRAMS/M^2
BH CV ECHO MEAS - LV MEAN PG: 2 MMHG
BH CV ECHO MEAS - LV SYSTOLIC VOL/BSA (12-30): 22 ML/M^2
BH CV ECHO MEAS - LV V1 MAX: 87 CM/SEC
BH CV ECHO MEAS - LV V1 MEAN: 58.6 CM/SEC
BH CV ECHO MEAS - LV V1 VTI: 20.4 CM
BH CV ECHO MEAS - LVIDD: 5.5 CM
BH CV ECHO MEAS - LVIDS: 3.8 CM
BH CV ECHO MEAS - LVLD AP2: 8.3 CM
BH CV ECHO MEAS - LVLD AP4: 7.3 CM
BH CV ECHO MEAS - LVLS AP2: 7.2 CM
BH CV ECHO MEAS - LVLS AP4: 6.1 CM
BH CV ECHO MEAS - LVOT AREA (M): 2.5 CM^2
BH CV ECHO MEAS - LVOT AREA: 2.5 CM^2
BH CV ECHO MEAS - LVOT DIAM: 1.8 CM
BH CV ECHO MEAS - LVPWD: 1.2 CM
BH CV ECHO MEAS - MED PEAK E' VEL: 4 CM/SEC
BH CV ECHO MEAS - MR MAX PG: 106.5 MMHG
BH CV ECHO MEAS - MR MAX VEL: 516 CM/SEC
BH CV ECHO MEAS - MV A DUR: 0.11 SEC
BH CV ECHO MEAS - MV A MAX VEL: 120 CM/SEC
BH CV ECHO MEAS - MV DEC SLOPE: 594 CM/SEC^2
BH CV ECHO MEAS - MV DEC TIME: 0.26 SEC
BH CV ECHO MEAS - MV E MAX VEL: 126 CM/SEC
BH CV ECHO MEAS - MV E/A: 1.1
BH CV ECHO MEAS - MV MEAN PG: 3.5 MMHG
BH CV ECHO MEAS - MV P1/2T MAX VEL: 133 CM/SEC
BH CV ECHO MEAS - MV P1/2T: 65.6 MSEC
BH CV ECHO MEAS - MV V2 MEAN: 90.1 CM/SEC
BH CV ECHO MEAS - MV V2 VTI: 43.9 CM
BH CV ECHO MEAS - MVA P1/2T LCG: 1.7 CM^2
BH CV ECHO MEAS - MVA(P1/2T): 3.4 CM^2
BH CV ECHO MEAS - MVA(VTI): 1.2 CM^2
BH CV ECHO MEAS - PA ACC SLOPE: 12 CM/SEC^2
BH CV ECHO MEAS - PA ACC TIME: 0.11 SEC
BH CV ECHO MEAS - PA MAX PG (FULL): 1.5 MMHG
BH CV ECHO MEAS - PA MAX PG: 4 MMHG
BH CV ECHO MEAS - PA PR(ACCEL): 28.2 MMHG
BH CV ECHO MEAS - PA V2 MAX: 100 CM/SEC
BH CV ECHO MEAS - PULM A REVS DUR: 0.1 SEC
BH CV ECHO MEAS - PULM A REVS VEL: 30.1 CM/SEC
BH CV ECHO MEAS - PULM DIAS VEL: 43.4 CM/SEC
BH CV ECHO MEAS - PULM S/D: 1.1
BH CV ECHO MEAS - PULM SYS VEL: 49.4 CM/SEC
BH CV ECHO MEAS - PVA(V,A): 3.3 CM^2
BH CV ECHO MEAS - PVA(V,D): 3.3 CM^2
BH CV ECHO MEAS - QP/QS: 1.4
BH CV ECHO MEAS - RAP SYSTOLE: 3 MMHG
BH CV ECHO MEAS - RV MAX PG: 2.5 MMHG
BH CV ECHO MEAS - RV MEAN PG: 1 MMHG
BH CV ECHO MEAS - RV V1 MAX: 79 CM/SEC
BH CV ECHO MEAS - RV V1 MEAN: 49.9 CM/SEC
BH CV ECHO MEAS - RV V1 VTI: 17.2 CM
BH CV ECHO MEAS - RVOT AREA: 4.2 CM^2
BH CV ECHO MEAS - RVOT DIAM: 2.3 CM
BH CV ECHO MEAS - RVSP: 32.4 MMHG
BH CV ECHO MEAS - SI(CUBED): 64.4 ML/M^2
BH CV ECHO MEAS - SI(LVOT): 30 ML/M^2
BH CV ECHO MEAS - SI(MOD-SP2): 26.6 ML/M^2
BH CV ECHO MEAS - SI(MOD-SP4): 26.6 ML/M^2
BH CV ECHO MEAS - SI(TEICH): 49.4 ML/M^2
BH CV ECHO MEAS - SV(CUBED): 111.5 ML
BH CV ECHO MEAS - SV(LVOT): 51.9 ML
BH CV ECHO MEAS - SV(MOD-SP2): 46 ML
BH CV ECHO MEAS - SV(MOD-SP4): 46 ML
BH CV ECHO MEAS - SV(RVOT): 71.5 ML
BH CV ECHO MEAS - SV(TEICH): 85.5 ML
BH CV ECHO MEAS - TR MAX VEL: 271 CM/SEC
BH CV ECHO MEASUREMENTS AVERAGE E/E' RATIO: 31.5
BH CV XLRA - RV BASE: 3.9 CM
BH CV XLRA - TDI S': 13 CM/SEC
BILIRUB SERPL-MCNC: 0.3 MG/DL (ref 0.2–1.2)
BUN BLD-MCNC: 15 MG/DL (ref 8–23)
BUN/CREAT SERPL: 21.7 (ref 7–25)
CALCIUM SPEC-SCNC: 8.9 MG/DL (ref 8.6–10.5)
CHLORIDE SERPL-SCNC: 107 MMOL/L (ref 98–107)
CHOLEST SERPL-MCNC: 136 MG/DL (ref 0–200)
CO2 SERPL-SCNC: 26.8 MMOL/L (ref 22–29)
CREAT BLD-MCNC: 0.69 MG/DL (ref 0.57–1)
DEPRECATED RDW RBC AUTO: 46.1 FL (ref 37–54)
DEPRECATED RDW RBC AUTO: 46.3 FL (ref 37–54)
EOSINOPHIL # BLD AUTO: 0.13 10*3/MM3 (ref 0–0.4)
EOSINOPHIL NFR BLD AUTO: 2.6 % (ref 0.3–6.2)
ERYTHROCYTE [DISTWIDTH] IN BLOOD BY AUTOMATED COUNT: 14.2 % (ref 12.3–15.4)
ERYTHROCYTE [DISTWIDTH] IN BLOOD BY AUTOMATED COUNT: 14.4 % (ref 12.3–15.4)
GFR SERPL CREATININE-BSD FRML MDRD: 83 ML/MIN/1.73
GLOBULIN UR ELPH-MCNC: 2.7 GM/DL
GLUCOSE BLD-MCNC: 131 MG/DL (ref 65–99)
GLUCOSE BLDC GLUCOMTR-MCNC: 113 MG/DL (ref 70–130)
GLUCOSE BLDC GLUCOMTR-MCNC: 177 MG/DL (ref 70–130)
GLUCOSE BLDC GLUCOMTR-MCNC: 178 MG/DL (ref 70–130)
GLUCOSE BLDC GLUCOMTR-MCNC: 187 MG/DL (ref 70–130)
HBA1C MFR BLD: 6.3 % (ref 4.8–5.6)
HCT VFR BLD AUTO: 35.2 % (ref 34–46.6)
HCT VFR BLD AUTO: 35.3 % (ref 34–46.6)
HDLC SERPL-MCNC: 52 MG/DL (ref 40–60)
HGB BLD-MCNC: 11 G/DL (ref 12–15.9)
HGB BLD-MCNC: 11 G/DL (ref 12–15.9)
IMM GRANULOCYTES # BLD AUTO: 0.01 10*3/MM3 (ref 0–0.05)
IMM GRANULOCYTES NFR BLD AUTO: 0.2 % (ref 0–0.5)
INR PPP: 0.99 (ref 0.9–1.1)
LDLC SERPL CALC-MCNC: 74 MG/DL (ref 0–100)
LDLC/HDLC SERPL: 1.42 {RATIO}
LEFT ATRIUM VOLUME INDEX: 19 ML/M2
LEFT ATRIUM VOLUME: 35 CM3
LYMPHOCYTES # BLD AUTO: 1.18 10*3/MM3 (ref 0.7–3.1)
LYMPHOCYTES NFR BLD AUTO: 23.4 % (ref 19.6–45.3)
MAXIMAL PREDICTED HEART RATE: 145 BPM
MCH RBC QN AUTO: 27.8 PG (ref 26.6–33)
MCH RBC QN AUTO: 27.9 PG (ref 26.6–33)
MCHC RBC AUTO-ENTMCNC: 31.2 G/DL (ref 31.5–35.7)
MCHC RBC AUTO-ENTMCNC: 31.3 G/DL (ref 31.5–35.7)
MCV RBC AUTO: 89.1 FL (ref 79–97)
MCV RBC AUTO: 89.6 FL (ref 79–97)
MONOCYTES # BLD AUTO: 0.52 10*3/MM3 (ref 0.1–0.9)
MONOCYTES NFR BLD AUTO: 10.3 % (ref 5–12)
NEUTROPHILS # BLD AUTO: 3.16 10*3/MM3 (ref 1.7–7)
NEUTROPHILS NFR BLD AUTO: 62.7 % (ref 42.7–76)
NRBC BLD AUTO-RTO: 0 /100 WBC (ref 0–0.2)
PLATELET # BLD AUTO: 208 10*3/MM3 (ref 140–450)
PLATELET # BLD AUTO: 212 10*3/MM3 (ref 140–450)
PMV BLD AUTO: 10.8 FL (ref 6–12)
PMV BLD AUTO: 11 FL (ref 6–12)
POTASSIUM BLD-SCNC: 4.1 MMOL/L (ref 3.5–5.2)
PROT SERPL-MCNC: 6.2 G/DL (ref 6–8.5)
PROTHROMBIN TIME: 12.8 SECONDS (ref 11.7–14.2)
RBC # BLD AUTO: 3.94 10*6/MM3 (ref 3.77–5.28)
RBC # BLD AUTO: 3.95 10*6/MM3 (ref 3.77–5.28)
SODIUM BLD-SCNC: 142 MMOL/L (ref 136–145)
STRESS TARGET HR: 123 BPM
TRIGL SERPL-MCNC: 50 MG/DL (ref 0–150)
TROPONIN T SERPL-MCNC: 0.32 NG/ML (ref 0–0.03)
VLDLC SERPL-MCNC: 10 MG/DL (ref 5–40)
WBC NRBC COR # BLD: 4.78 10*3/MM3 (ref 3.4–10.8)
WBC NRBC COR # BLD: 5.04 10*3/MM3 (ref 3.4–10.8)

## 2019-05-15 PROCEDURE — 93306 TTE W/DOPPLER COMPLETE: CPT | Performed by: INTERNAL MEDICINE

## 2019-05-15 PROCEDURE — 85610 PROTHROMBIN TIME: CPT | Performed by: PSYCHIATRY & NEUROLOGY

## 2019-05-15 PROCEDURE — 85730 THROMBOPLASTIN TIME PARTIAL: CPT | Performed by: HOSPITALIST

## 2019-05-15 PROCEDURE — 92610 EVALUATE SWALLOWING FUNCTION: CPT

## 2019-05-15 PROCEDURE — 93306 TTE W/DOPPLER COMPLETE: CPT

## 2019-05-15 PROCEDURE — 25010000002 PERFLUTREN (DEFINITY) 8.476 MG IN SODIUM CHLORIDE 0.9 % 10 ML INJECTION: Performed by: INTERNAL MEDICINE

## 2019-05-15 PROCEDURE — 82962 GLUCOSE BLOOD TEST: CPT

## 2019-05-15 PROCEDURE — 99254 IP/OBS CNSLTJ NEW/EST MOD 60: CPT | Performed by: NURSE PRACTITIONER

## 2019-05-15 PROCEDURE — 83036 HEMOGLOBIN GLYCOSYLATED A1C: CPT | Performed by: INTERNAL MEDICINE

## 2019-05-15 PROCEDURE — 63710000001 INSULIN LISPRO (HUMAN) PER 5 UNITS: Performed by: HOSPITALIST

## 2019-05-15 PROCEDURE — 84484 ASSAY OF TROPONIN QUANT: CPT | Performed by: INTERNAL MEDICINE

## 2019-05-15 PROCEDURE — 85027 COMPLETE CBC AUTOMATED: CPT | Performed by: INTERNAL MEDICINE

## 2019-05-15 PROCEDURE — 99255 IP/OBS CONSLTJ NEW/EST HI 80: CPT | Performed by: PSYCHIATRY & NEUROLOGY

## 2019-05-15 PROCEDURE — 85025 COMPLETE CBC W/AUTO DIFF WBC: CPT | Performed by: PSYCHIATRY & NEUROLOGY

## 2019-05-15 PROCEDURE — 25010000002 HEPARIN (PORCINE) PER 1000 UNITS: Performed by: PSYCHIATRY & NEUROLOGY

## 2019-05-15 PROCEDURE — 80053 COMPREHEN METABOLIC PANEL: CPT | Performed by: INTERNAL MEDICINE

## 2019-05-15 PROCEDURE — 85730 THROMBOPLASTIN TIME PARTIAL: CPT | Performed by: PSYCHIATRY & NEUROLOGY

## 2019-05-15 PROCEDURE — 80061 LIPID PANEL: CPT | Performed by: INTERNAL MEDICINE

## 2019-05-15 RX ORDER — HEPARIN SODIUM 10000 [USP'U]/100ML
12 INJECTION, SOLUTION INTRAVENOUS
Status: DISCONTINUED | OUTPATIENT
Start: 2019-05-15 | End: 2019-05-17

## 2019-05-15 RX ORDER — NICOTINE POLACRILEX 4 MG
15 LOZENGE BUCCAL
Status: DISCONTINUED | OUTPATIENT
Start: 2019-05-15 | End: 2019-05-18 | Stop reason: HOSPADM

## 2019-05-15 RX ORDER — ASPIRIN 81 MG/1
81 TABLET, CHEWABLE ORAL DAILY
Status: DISCONTINUED | OUTPATIENT
Start: 2019-05-15 | End: 2019-05-18 | Stop reason: HOSPADM

## 2019-05-15 RX ORDER — DEXTROSE MONOHYDRATE 25 G/50ML
25 INJECTION, SOLUTION INTRAVENOUS
Status: DISCONTINUED | OUTPATIENT
Start: 2019-05-15 | End: 2019-05-18 | Stop reason: HOSPADM

## 2019-05-15 RX ADMIN — SODIUM CHLORIDE 75 ML/HR: 9 INJECTION, SOLUTION INTRAVENOUS at 13:57

## 2019-05-15 RX ADMIN — ASPIRIN 81 MG: 81 TABLET, CHEWABLE ORAL at 13:57

## 2019-05-15 RX ADMIN — PERFLUTREN 2 ML: 6.52 INJECTION, SUSPENSION INTRAVENOUS at 13:31

## 2019-05-15 RX ADMIN — SODIUM CHLORIDE, PRESERVATIVE FREE 3 ML: 5 INJECTION INTRAVENOUS at 10:27

## 2019-05-15 RX ADMIN — BRIMONIDINE TARTRATE 1 DROP: 2 SOLUTION OPHTHALMIC at 21:31

## 2019-05-15 RX ADMIN — LOSARTAN POTASSIUM: 50 TABLET, FILM COATED ORAL at 16:05

## 2019-05-15 RX ADMIN — BRIMONIDINE TARTRATE 1 DROP: 2 SOLUTION OPHTHALMIC at 10:26

## 2019-05-15 RX ADMIN — HEPARIN SODIUM 12 UNITS/KG/HR: 10000 INJECTION, SOLUTION INTRAVENOUS at 10:48

## 2019-05-15 RX ADMIN — DORZOLAMIDE HYDROCHLORIDE 1 DROP: 20 SOLUTION/ DROPS OPHTHALMIC at 10:26

## 2019-05-15 RX ADMIN — DORZOLAMIDE HYDROCHLORIDE 1 DROP: 20 SOLUTION/ DROPS OPHTHALMIC at 21:31

## 2019-05-15 RX ADMIN — HEPARIN SODIUM 15 UNITS/KG/HR: 10000 INJECTION, SOLUTION INTRAVENOUS at 17:42

## 2019-05-15 RX ADMIN — ATORVASTATIN CALCIUM 20 MG: 20 TABLET, FILM COATED ORAL at 21:30

## 2019-05-15 RX ADMIN — INSULIN LISPRO 2 UNITS: 100 INJECTION, SOLUTION INTRAVENOUS; SUBCUTANEOUS at 21:30

## 2019-05-15 NOTE — CONSULTS
"Neurology Consult Note    Consult Date: 5/15/2019    Referring MD: Faizan Wilder MD    Reason for Consult I have been asked to see the patient in neurological consultation to render advice and opinion regarding stroke    Landy Workman is a 74 y.o. female with past medical history of essential hypertension, mixed hyperlipidemia, non-insulin dependent diabetes, no prior history of CAD, stroke or atrial fibrillation.    She developed vision changes in the left eye on Saturday.  She noticed a blob\" in the left eye while she was trying to read the newspaper.  She had a general sensation of feeling unwell and felt mildly dizzy and off-balance.  She is also recently had some sinus pain and left tooth pain.  She saw her eye doctor yesterday who diagnosed her with left central retinal artery occlusion and recommended she come to the hospital for stroke evaluation.  Since she has been admitted her left eye vision deficit has gradually improved but she still has a slight visual distortion in the left eye.  She denies any unilateral weakness numbness or facial droop.  She is never had any TIAs in the past.    Past Medical/Surgical Hx:  Past Medical History:   Diagnosis Date   • Diabetes mellitus (CMS/[facility])    • Hyperlipidemia    • Hypertension      History reviewed. No pertinent surgical history.    Medications On Admission  Medications Prior to Admission   Medication Sig Dispense Refill Last Dose   • atorvastatin (LIPITOR) 20 MG tablet Take 20 mg by mouth Daily.      • brimonidine (ALPHAGAN) 0.2 % ophthalmic solution Administer 1 drop to both eyes 2 (Two) Times a Day.      • dorzolamide (TRUSOPT) 2 % ophthalmic solution Administer 1 drop to both eyes 2 (Two) Times a Day.      • glipiZIDE (GLUCOTROL) 10 MG tablet Take 10 mg by mouth 2 (Two) Times a Day Before Meals.      • losartan-hydrochlorothiazide (HYZAAR) 100-12.5 MG per tablet Take 1 tablet by mouth Daily.      • metFORMIN (GLUCOPHAGE) 1000 MG tablet Take 1,000 mg by mouth 2 " "(Two) Times a Day With Meals.      • nebivolol (BYSTOLIC) 20 MG tablet Take 20 mg by mouth Daily.      • pioglitazone (ACTOS) 45 MG tablet Take 45 mg by mouth Daily.          Allergies:  No Known Allergies    Social Hx:  Social History     Socioeconomic History   • Marital status:      Spouse name: Not on file   • Number of children: Not on file   • Years of education: Not on file   • Highest education level: Not on file   Tobacco Use   • Smoking status: Never Smoker   • Smokeless tobacco: Never Used   Substance and Sexual Activity   • Alcohol use: No     Frequency: Never   • Drug use: No       Family Hx:  Family History   Problem Relation Age of Onset   • Breast cancer Neg Hx        Review of Systems  Constitutional: [No fevers, chills]  Eye: [+ recent visual problems, no eye discharge]  HEENT: [No ear pain, + nasal congestion]  Respiratory: [No shortness of breath, cough]  Cardiovascular: [No Chest pain, palpitations]  Gastrointestinal: [No nausea, vomiting]  Genitourinary: [No hematuria, incontinence]  Hema/Lymph: [no nosebleeds, history of anticoagulation]  Endocrine: [Negative for excessive urination, heat or cold intolerance]  Musculoskeletal: [No back pain, neck pain]  Integumentary: [No rash, pruritus]  Neurologic: [No weakness, numbness]  Psychiatric: [No anxiety, depression]    Exam    /85 (BP Location: Left arm, Patient Position: Lying)   Pulse 76   Temp 97.7 °F (36.5 °C) (Oral)   Resp 16   Ht 165.1 cm (65\")   Wt 66.5 kg (146 lb 9.6 oz)   SpO2 94%   BMI 24.40 kg/m²   gen: NAD, vitals reviewed  Eyes: Left optic disc pale  HEENT: no nuchal rigidity  CVS: RRR, S1, S2  MS: oriented x3, recent/remote memory intact, normal attention/concentration, language intact, no neglect, normal fund of knowledge  CN: visual acuity grossly normal, visual fields full, PERRL, EOMI, facial sensation equal, no facial droop, hearing symmetric, palate elevates symmetrically, shoulder shrug equal, tongue " midline  Motor: 5/5 throughout upper and lower extremities, normal tone  Sensation: intact to vibration and temperature throughout  Reflexes: 2+ throughout upper and lower extremities, downgoing plantars  Coordination: no dysmetria with finger to nose bilaterally  Gait: no ataxia    DATA:    Lab Results   Component Value Date    GLUCOSE 131 (H) 05/15/2019    CALCIUM 8.9 05/15/2019     05/15/2019    K 4.1 05/15/2019    CO2 26.8 05/15/2019     05/15/2019    BUN 15 05/15/2019    CREATININE 0.69 05/15/2019    EGFRIFNONA 83 05/15/2019    BCR 21.7 05/15/2019    ANIONGAP 8.2 05/15/2019     Lab Results   Component Value Date    WBC 4.78 05/15/2019    WBC 5.04 05/15/2019    HGB 11.0 (L) 05/15/2019    HGB 11.0 (L) 05/15/2019    HCT 35.2 05/15/2019    HCT 35.3 05/15/2019    MCV 89.1 05/15/2019    MCV 89.6 05/15/2019     05/15/2019     05/15/2019     Lab Results   Component Value Date    CHOL 136 05/15/2019     Lab Results   Component Value Date    HDL 52 05/15/2019     Lab Results   Component Value Date    LDL 74 05/15/2019     Lab Results   Component Value Date    TRIG 50 05/15/2019     Lab Results   Component Value Date    HGBA1C 6.30 (H) 05/15/2019     Lab Results   Component Value Date    INR 0.99 05/15/2019    PROTIME 12.8 05/15/2019       Lab review: Hemoglobin 11, BMP normal    Imaging review: I personally reviewed her MRI brain and MRA head.  MRI brain shows bilateral embolic appearing infarcts, very suspicious for central source.  MRA head neck are essentially negative.  Radiology report reviewed    Impression:  1) left central retinal artery occlusion  2) stroke bilateral middle cerebral artery and posterior cerebral arteries, cardioembolic  3) essential hypertension  4) mixed hyperlipidemia    Comment: She has left CRAO and multifocal embolic strokes, highly suspicious for cardiac source.  No cardiac symptoms although she does have mild troponin elevation.  We will go ahead and start  heparin drip and wait on 2D echo.  If this is negative will request DANY from cardiology.    PLAN:  1. ASA, statin  2. Heparin gtt  3. 2D echo, likely will need DANY if negative. Would probably be appropriate for a LINQ if structural heart imaging is unrevealing (MRAs show no carotid stenosis or ICAD)    Will follow

## 2019-05-15 NOTE — PLAN OF CARE
Problem: Patient Care Overview  Goal: Plan of Care Review   05/15/19 0800   OTHER   Outcome Summary Pt is independent with mobility. No strength or balance deficits. no acute care PT needs.

## 2019-05-15 NOTE — THERAPY EVALUATION
Acute Care - Speech Language Pathology   Swallow Initial Evaluation [facility]     Patient Name: Landy Workman  : 1945  MRN: [id number]  Today's Date: 5/15/2019               Admit Date: 2019    Visit Dx:     ICD-10-CM ICD-9-CM   1. Hypertensive crisis I16.9 401.9     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)     Past Medical History:   Diagnosis Date   • Diabetes mellitus (CMS/HCC)    • Hyperlipidemia    • Hypertension      History reviewed. No pertinent surgical history.     SWALLOW EVALUATION (last 72 hours)      SLP Adult Swallow Evaluation     Row Name 05/15/19 1700                   Rehab Evaluation    Document Type  evaluation  -AW        Subjective Information  no complaints  -AW        Patient Observations  alert;cooperative;agree to therapy  -AW        Patient Effort  good  -AW        Symptoms Noted During/After Treatment  none  -AW           General Information    Patient Profile Reviewed  yes  -AW        Pertinent History Of Current Problem  Pt admitted with vision changes. MRI showed B embolic infarcts.  -AW        Current Method of Nutrition  regular textures;thin liquids  -AW        Precautions/Limitations, Vision  other (see comments) improved since admission  -AW        Precautions/Limitations, Hearing  WFL  -AW        Prior Level of Function-Communication  WFL  -AW        Prior Level of Function-Swallowing  no diet consistency restrictions  -AW        Plans/Goals Discussed with  patient;agreed upon  -AW        Barriers to Rehab  none identified  -AW        Patient's Goals for Discharge  return home  -AW           Pain Assessment    Additional Documentation  Pain Scale: Numbers Pre/Post-Treatment (Group)  -AW           Pain Scale: Numbers Pre/Post-Treatment    Pain Scale: Numbers, Pretreatment  0/10 - no pain   -AW        Pain Scale: Numbers, Post-Treatment  0/10 - no pain  -AW           Oral Motor and Function    Dentition Assessment  natural, present and adequate  -AW        Secretion Management  WNL/WFL  -AW        Mucosal Quality  moist, healthy  -AW        Volitional Swallow  WFL  -AW        Volitional Cough  WFL  -AW           Oral Musculature and Cranial Nerve Assessment    Oral Motor General Assessment  WFL  -AW           General Eating/Swallowing Observations    Respiratory Support Currently in Use  room air  -AW        Eating/Swallowing Skills  self-fed  -AW        Positioning During Eating  upright in bed  -AW        Utensils Used  spoon;cup;straw  -AW        Consistencies Trialed  regular textures;soft textures;pureed;thin liquids mixed  -AW           Clinical Swallow Eval    Oral Prep Phase  WFL  -AW        Oral Transit  WFL  -AW        Oral Residue  WFL  -AW        Pharyngeal Phase  no overt signs/symptoms of pharyngeal impairment  -AW        Clinical Swallow Evaluation Summary  Pt tolerated thins via cup and straw. Mastication functional for soft, mixed, and regular solids. No difficulty with pills with water. Laryngeal elevation adequate and swallow is timely. Pt reported vision is improving and she can now read the newspaper. No changes with speech or cognition.   -AW           Clinical Impression    SLP Swallowing Diagnosis  functional oral phase;functional pharyngeal phase  -AW        Functional Impact  no impact on function  -AW        Swallow Criteria for Skilled Therapeutic Interventions Met  no problems identified which require skilled intervention  -AW           Recommendations    Therapy Frequency (Swallow)  evaluation only  -AW        SLP Diet Recommendation  regular textures;thin liquids  -AW        Recommended Precautions and Strategies  upright posture during/after eating;small bites of food and sips of liquid  -AW        SLP Rec. for Method of Medication Administration  meds whole;with thin  liquids  -AW        Monitor for Signs of Aspiration  yes;notify SLP if any concerns  -AW        Anticipated Dischage Disposition  home  -AW          User Key  (r) = Recorded By, (t) = Taken By, (c) = Cosigned By    Initials Name Effective Dates    Ingris White MS CCC-SLP 06/08/18 -           EDUCATION  The patient has been educated in the following areas:   Dysphagia (Swallowing Impairment) Oral Care/Hydration.    SLP Recommendation and Plan  SLP Swallowing Diagnosis: functional oral phase, functional pharyngeal phase  SLP Diet Recommendation: regular textures, thin liquids  Recommended Precautions and Strategies: upright posture during/after eating, small bites of food and sips of liquid  SLP Rec. for Method of Medication Administration: meds whole, with thin liquids     Monitor for Signs of Aspiration: yes, notify SLP if any concerns     Swallow Criteria for Skilled Therapeutic Interventions Met: no problems identified which require skilled intervention  Anticipated Dischage Disposition: home     Therapy Frequency (Swallow): evaluation only          Plan of Care Reviewed With: patient  Plan of Care Review  Plan of Care Reviewed With: patient  Progress: no change  Outcome Summary: Bedside Swallow eval completed with no s/s of aspiration. Recommend continue on a regular diet. ST to sign off. Please reconsult as needed.         SLP Outcome Measures (last 72 hours)      SLP Outcome Measures     Row Name 05/15/19 1700             SLP Outcome Measures    Outcome Measure Used?  Adult NOMS  -AW         Adult FCM Scores    FCM Chosen  Swallowing  -AW      Swallowing FCM Score  7  -AW        User Key  (r) = Recorded By, (t) = Taken By, (c) = Cosigned By    Initials Name Effective Dates    MICHAEL JasonIngris MS CCC-SLP 06/08/18 -            Time Calculation:   Time Calculation- SLP     Row Name 05/15/19 1388             Time Calculation- SLP    SLP Start Time  1500  -AW      SLP Received On  05/15/19  -AW        User Key   (r) = Recorded By, (t) = Taken By, (c) = Cosigned By    Initials Name Provider Type    AW Ingris Gomez, MS CCC-SLP Speech and Language Pathologist          Therapy Charges for Today     Code Description Service Date Service Provider Modifiers Qty    [id number]  ST EVAL ORAL PHARYNG SWALLOW 3 5/15/2019 Ingris Gomez, MS CCC-SLP GN 1               Ingris Gomez MS CCC-SLP  5/15/2019

## 2019-05-15 NOTE — SIGNIFICANT NOTE
05/15/19 1139   Rehab Time/Intention   Evaluation Not Performed other (see comments)  (pt is independent, no current OT needs. d/c OT. no strength, coordination, or balance issues. pt agrees to dc. and full eval with charge not warranted. 1008)   Rehab Treatment   Discipline occupational therapist

## 2019-05-15 NOTE — NURSING NOTE
Notified Dr Christy of lower blood pressure and cardene gtt infusing.  Ordered to stop gtt for now and goal is to keep SBP<180.  Made him aware of MRI of brain also.

## 2019-05-15 NOTE — PLAN OF CARE
Problem: Patient Care Overview  Goal: Plan of Care Review  Outcome: Ongoing (interventions implemented as appropriate)   05/15/19 8249   Coping/Psychosocial   Plan of Care Reviewed With patient   Plan of Care Review   Progress no change   OTHER   Outcome Summary Bedside Swallow eval completed with no s/s of aspiration. Recommend continue on a regular diet. ST to sign off. Please reconsult as needed.

## 2019-05-15 NOTE — PROGRESS NOTES
[facility]     LOS: 1 day     Name: Landy Workman  Age: 74 y.o.  Sex: female  :  1945  MRN: [id number]         Primary Care Physician: Braulio Beyer MD    Diet Regular  NPO Diet    Subjective   She states her vision is better she is able to read the newspaper.  There is still some deficit but she feels it is much better. Greater than 50% of time spent in counseling and/or coordination of care. Total face/floor time 35 minutes.     Review of Systems   Respiratory: Negative for shortness of breath.    Cardiovascular: Negative for chest pain.     Objective   Temp:  [97.4 °F (36.3 °C)-98.4 °F (36.9 °C)] 97.8 °F (36.6 °C)  Heart Rate:  [54-76] 59  Resp:  [16-18] 16  BP: (126-225)/() 189/81  SpO2:  [93 %-99 %] 96 %  on   ;   Device (Oxygen Therapy): room air  Body mass index is 24.3 kg/m².    Physical Exam   Constitutional: She is oriented to person, place, and time. No distress.   Cardiovascular: Normal rate and regular rhythm.   Pulmonary/Chest: Effort normal and breath sounds normal. No respiratory distress.   Abdominal: Soft. Bowel sounds are normal. There is no tenderness. There is no rebound and no guarding.   Musculoskeletal: She exhibits no edema.   Neurological: She is alert and oriented to person, place, and time.   Skin: Skin is warm and dry.   Psychiatric: She has a normal mood and affect. Her behavior is normal.     Reviewed medications and new clinical results    aspirin 81 mg Oral Daily   atorvastatin 20 mg Oral Nightly   brimonidine 1 drop Both Eyes BID   dorzolamide 1 drop Both Eyes BID   losartan-HCTZ (HYZAAR) 100-12.5 combo dose  Oral Daily   nebivolol 20 mg Oral Daily   sodium chloride 3 mL Intravenous Q12H     heparin (porcine) 12 Units/kg/hr Last Rate: 12 Units/kg/hr (05/15/19 2118)   niCARdipine 5-15 mg/hr Last Rate: Stopped (05/15/19 0040)   sodium chloride 75 mL/hr Last Rate: 75 mL/hr (05/15/19 0007)     Results  from last 7 days   Lab Units 05/15/19  0534 05/14/19  1701   WBC 10*3/mm3 5.04  4.78 5.03   HEMOGLOBIN g/dL 11.0*  11.0* 11.2*   PLATELETS 10*3/mm3 212  208 240     Results from last 7 days   Lab Units 05/15/19  0534 05/14/19  1701   SODIUM mmol/L 142 133*   POTASSIUM mmol/L 4.1 3.9   CHLORIDE mmol/L 107 97*   CO2 mmol/L 26.8 27.7   BUN mg/dL 15 19   CREATININE mg/dL 0.69 0.96   CALCIUM mg/dL 8.9 9.1   GLUCOSE mg/dL 131* 116*     Lab Results   Component Value Date    ANIONGAP 8.2 05/15/2019     Glucose   Date/Time Value Ref Range Status   05/15/2019 1548 187 (H) 70 - 130 mg/dL Final   05/15/2019 1056 178 (H) 70 - 130 mg/dL Final   05/15/2019 0555 113 70 - 130 mg/dL Final     Hemoglobin A1C   Date Value Ref Range Status   05/15/2019 6.30 (H) 4.80 - 5.60 % Final     Estimated Creatinine Clearance: 63.5 mL/min (by C-G formula based on SCr of 0.69 mg/dL).    Results for orders placed during the hospital encounter of 05/14/19   Adult transthoracic echo complete    Narrative · Calculated EF = 54%. Estimated EF was in agreement with the calculated   EF. Normal left ventricular cavity size noted. All left ventricular wall   segments contract normally.  · Left ventricular wall thickness is consistent with mild concentric   hypertrophy.  · Left ventricular diastolic dysfunction is noted (grade II w/high LAP)   consistent with pseudonormalization.  · There is moderate calcification of the aortic valve.  · Mild aortic valve regurgitation is present.  · The mitral valve is markedly abnormal. There is calcification and   retracted chordae of the posterior mitral valve leaflet. There is a very   large echogenic mass in the area of the posterior mitral annulus that may   just represent calcification however there is a mobile echogenic structure   attached to the left ventricular side of that area.  · Mild mitral valve regurgitation is present.        I personally reviewed MRI    Assessment/Plan   Active Hospital Problems     Diagnosis  POA   • **Hypertensive crisis [I16.9]  Yes   • Diabetes mellitus (CMS/HCC) [E11.9]  Unknown   • Hyperlipidemia [E78.5]  Unknown   • Hypertension [I10]  Unknown   • Elevated troponin [R74.8]  Unknown      Resolved Hospital Problems   No resolved problems to display.     74 y.o. female admitted with left eye vision changes    · Left central retinal artery occlusion, acute strokes likely embolic: Aspirin, statin, continue heparin drip.  2D echocardiogram: Noted.  DANY tomorrow.  · Hypertension: Slowly normalize blood pressure per cardiology  · Elevated troponin: Possible stress test as an outpatient 1 diabetes mellitus type 2: A1c 6.30.  Control is fair here.  · disposition to be determined  · discussed with patient and nursing staff.    Gilberto Maria MD   05/15/19  4:34 PM

## 2019-05-15 NOTE — CONSULTS
Patient Name: Landy Workman  :1945  74 y.o.    Date of Admission: 2019  Date of Consultation:  05/15/19  Encounter Provider: GETACHEW Brown  Place of Service: [facility] CARDIOLOGY  Referring Provider: Faizan Wilder MD  Patient Care Team:  Braulio Beyer MD as PCP - General (Nephrology)      Chief complaint: vision changes    History of Present Illness:Landy Workman is a 74 year old pt with a history of diabetes, HTN and dyslipidemia. She is new to [facility] and has never seen a cardiologist. She presented to the opthamologist with vision changes that started on Saturday. She was doing to have a left retinal artery occlusion and was referred to the emergency room for stroke evaluation. She was noted to be significantly hypertensive with SBP >200. She was placed on a cardene drip. She had an MRI that showed bilateral embolic appearing infarcts in multiple vascular territories. MRA of neck was negative for carotid stenosis. We have been consulted for hypertension and cardiac source of emboli evaluation.     She had a transthoracic echocardiogram that showed an abnormal mitral valve. There is a very large echogenic mass in the area of the posterior mitral annulus that may just represent calcification however there is a mobile echogenic structure attached to the left ventricular side of that area. I discussed this with Dr. Tomlinson and it is very atypical for being straight forward calcification.     She does not have any history of neck/throat surgery, she denies difficulty swallowing. She does not have any loose teeth. She denies problems with anesthesia.     Echo 5/15/19  · Calculated EF = 54%. Estimated EF was in agreement with the calculated EF. Normal left ventricular cavity size noted. All left ventricular wall segments contract normally.  · Left ventricular wall thickness is consistent with mild concentric hypertrophy.  · Left ventricular  diastolic dysfunction is noted (grade II w/high LAP) consistent with pseudonormalization.  · There is moderate calcification of the aortic valve.  · Mild aortic valve regurgitation is present.  · The mitral valve is markedly abnormal. There is calcification and retracted chordae of the posterior mitral valve leaflet. There is a very large echogenic mass in the area of the posterior mitral annulus that may just represent calcification however there is a mobile echogenic structure attached to the left ventricular side of that area.  · Mild mitral valve regurgitation is present.      Past Medical History:   Diagnosis Date   • Diabetes mellitus (CMS/[facility])    • Hyperlipidemia    • Hypertension        History reviewed. No pertinent surgical history.      Prior to Admission medications    Medication Sig Start Date End Date Taking? Authorizing Provider   atorvastatin (LIPITOR) 20 MG tablet Take 20 mg by mouth Daily.   Yes Tatyana Alcantara MD   brimonidine (ALPHAGAN) 0.2 % ophthalmic solution Administer 1 drop to both eyes 2 (Two) Times a Day.   Yes Tatyana Alcantara MD   dorzolamide (TRUSOPT) 2 % ophthalmic solution Administer 1 drop to both eyes 2 (Two) Times a Day.   Yes Tatyana Alcantara MD   glipiZIDE (GLUCOTROL) 10 MG tablet Take 10 mg by mouth 2 (Two) Times a Day Before Meals.   Yes Tatyana Alcantara MD   losartan-hydrochlorothiazide (HYZAAR) 100-12.5 MG per tablet Take 1 tablet by mouth Daily.   Yes Tatyana Alcantara MD   metFORMIN (GLUCOPHAGE) 1000 MG tablet Take 1,000 mg by mouth 2 (Two) Times a Day With Meals.   Yes Tatyana Alcantara MD   nebivolol (BYSTOLIC) 20 MG tablet Take 20 mg by mouth Daily.   Yes Tatyana Alcantara MD   pioglitazone (ACTOS) 45 MG tablet Take 45 mg by mouth Daily.   Yes Tatyana Alcantara MD       No Known Allergies    Social History     Socioeconomic History   • Marital status:      Spouse name: Not on file   • Number of children: Not on file   • Years  "of education: Not on file   • Highest education level: Not on file   Tobacco Use   • Smoking status: Never Smoker   • Smokeless tobacco: Never Used   Substance and Sexual Activity   • Alcohol use: No     Frequency: Never   • Drug use: No       Family History   Problem Relation Age of Onset   • Breast cancer Neg Hx        REVIEW OF SYSTEMS:   All systems reviewed.  Pertinent positives identified in HPI.  All other systems are negative.      Objective:     Vitals:    05/15/19 0436 05/15/19 0700 05/15/19 1159 05/15/19 1326   BP: 128/59 130/85 130/85 (!) 189/81   BP Location: Left arm Left arm  Left arm   Patient Position: Lying Lying  Lying   Pulse: 68 76 76 59   Resp:  16  16   Temp:  97.7 °F (36.5 °C)  97.8 °F (36.6 °C)   TempSrc:  Oral  Oral   SpO2: 94%   96%   Weight:   66.2 kg (146 lb)    Height:   165.1 cm (65\")      Body mass index is 24.3 kg/m².    General Appearance:    Alert, cooperative, in no acute distress   Head:    Normocephalic, without obvious abnormality, atraumatic   Eyes:            Lids and lashes normal, conjunctivae and sclerae normal, no   icterus, no pallor, corneas clear, PERRLA   Ears:    Ears appear intact with no abnormalities noted   Throat:   No oral lesions, no thrush, oral mucosa moist   Neck:   No adenopathy, supple, trachea midline, no thyromegaly, no   carotid bruit, no JVD   Back:     No kyphosis present, no scoliosis present, no skin lesions, erythema or scars, no tenderness to percussion or palpation, range of motion normal   Lungs:     Clear to auscultation,respirations regular, even and unlabored    Heart:    Regular rhythm and normal rate, normal S1 and S2, no murmur, no gallop, no rub, no click   Chest Wall:    No abnormalities observed   Abdomen:     Normal bowel sounds, no masses, no organomegaly, soft        non-tender, non-distended, no guarding, no rebound  tenderness   Extremities:   Moves all extremities well, no edema, no cyanosis, no redness   Pulses:   Pulses " palpable and equal bilaterally. Normal radial, carotid, femoral, dorsalis pedis and posterior tibial pulses bilaterally. Normal abdominal aorta   Skin:  Psychiatric:   No bleeding, bruising or rash    Alert and oriented x 3, normal mood and affect   Lab Review:     Results from last 7 days   Lab Units 05/15/19  0534   SODIUM mmol/L 142   POTASSIUM mmol/L 4.1   CHLORIDE mmol/L 107   CO2 mmol/L 26.8   BUN mg/dL 15   CREATININE mg/dL 0.69   CALCIUM mg/dL 8.9   BILIRUBIN mg/dL 0.3   ALK PHOS U/L 89   ALT (SGPT) U/L 10   AST (SGOT) U/L 17   GLUCOSE mg/dL 131*     Results from last 7 days   Lab Units 05/15/19  0534 05/14/19  1701   TROPONIN T ng/mL 0.322* 0.330*     Results from last 7 days   Lab Units 05/15/19  0534   WBC 10*3/mm3 5.04  4.78   HEMOGLOBIN g/dL 11.0*  11.0*   HEMATOCRIT % 35.3  35.2   PLATELETS 10*3/mm3 212  208     Results from last 7 days   Lab Units 05/15/19  0810   INR  0.99   APTT seconds 30.7         Results from last 7 days   Lab Units 05/15/19  0534   CHOLESTEROL mg/dL 136   TRIGLYCERIDES mg/dL 50   HDL CHOL mg/dL 52   LDL CHOL mg/dL 74                         I personally viewed and interpreted the patient's EKG/Telemetry data.        Assessment and Plan:    1. Acute stroke - MRI with bilateral embolic appearing infarcts suspicious for cardiac source of emboli in multiple vascular territories. Transthoracic echocardiogram with abnormal mitral valve. Plan for DANY tomorrow. Risks and benefits discussed with patient and she is agreeable to proceed. Cardiac MRI may be useful in assessing abnormality as well. Discussed with Dr. Tomlinson. She is currently on a heparin drip.     2. HTN - significantly elevated on admission. Cardene drip is now off. Given acute stroke, will slowly normalize BP.  BP high currently. RN to reach out to neuro for BP goals.     3. Elevated trop - absolutely no symptoms of angina. This could likely be elevated due to stroke. Normal LV function on echo with normal wall  motion. She does have risk factors of HTN, HLD, and DM. Can consider stress, possibly as outpatient.     4. HLD - on statin.     5. DM - Hgb A1C 6.3    Lucy Marinelli, APRN  05/15/19  1:52 PM

## 2019-05-15 NOTE — PLAN OF CARE
Problem: Patient Care Overview  Goal: Plan of Care Review  Outcome: Ongoing (interventions implemented as appropriate)   05/15/19 6389   Coping/Psychosocial   Plan of Care Reviewed With patient   Plan of Care Review   Progress no change   OTHER   Outcome Summary Received from ER for blurry vision and HTN, MRI brain showed numerous tiny foci, Dr Christy made aware, cardene gtt stopped, BP has improved, troponin was elevated, NIH 0, passed bedside swallow study, neuro and cardiology consulted,  A&Ox4, up ad fer, no complaints of pain, will continue to monitor.       Problem: Stroke (Ischemic) (Adult)  Goal: Signs and Symptoms of Listed Potential Problems Will be Absent, Minimized or Managed (Stroke)  Outcome: Ongoing (interventions implemented as appropriate)

## 2019-05-16 ENCOUNTER — APPOINTMENT (OUTPATIENT)
Dept: CARDIOLOGY | Facility: HOSPITAL | Age: 74
End: 2019-05-16

## 2019-05-16 LAB
APTT PPP: 56.6 SECONDS (ref 22.7–35.4)
APTT PPP: 75.3 SECONDS (ref 22.7–35.4)
BASOPHILS # BLD AUTO: 0.04 10*3/MM3 (ref 0–0.2)
BASOPHILS # BLD AUTO: 0.04 10*3/MM3 (ref 0–0.2)
BASOPHILS NFR BLD AUTO: 0.8 % (ref 0–1.5)
BASOPHILS NFR BLD AUTO: 0.9 % (ref 0–1.5)
BH CV ECHO MEAS - BSA(HAYCOCK): 1.8 M^2
BH CV ECHO MEAS - BSA: 1.8 M^2
BH CV ECHO MEAS - BZI_BMI: 25.5 KILOGRAMS/M^2
BH CV ECHO MEAS - BZI_METRIC_HEIGHT: 165.1 CM
BH CV ECHO MEAS - BZI_METRIC_WEIGHT: 69.4 KG
BH CV ECHO MEAS - RVSP: 39 MMHG
BH CV ECHO MEAS - TR MAX VEL: 308 CM/SEC
BH CV VAS BP RIGHT ARM: NORMAL MMHG
DEPRECATED RDW RBC AUTO: 46.3 FL (ref 37–54)
DEPRECATED RDW RBC AUTO: 46.8 FL (ref 37–54)
EOSINOPHIL # BLD AUTO: 0.31 10*3/MM3 (ref 0–0.4)
EOSINOPHIL # BLD AUTO: 0.31 10*3/MM3 (ref 0–0.4)
EOSINOPHIL NFR BLD AUTO: 6.1 % (ref 0.3–6.2)
EOSINOPHIL NFR BLD AUTO: 6.6 % (ref 0.3–6.2)
ERYTHROCYTE [DISTWIDTH] IN BLOOD BY AUTOMATED COUNT: 14.3 % (ref 12.3–15.4)
ERYTHROCYTE [DISTWIDTH] IN BLOOD BY AUTOMATED COUNT: 14.4 % (ref 12.3–15.4)
GLUCOSE BLDC GLUCOMTR-MCNC: 128 MG/DL (ref 70–130)
GLUCOSE BLDC GLUCOMTR-MCNC: 133 MG/DL (ref 70–130)
GLUCOSE BLDC GLUCOMTR-MCNC: 133 MG/DL (ref 70–130)
GLUCOSE BLDC GLUCOMTR-MCNC: 177 MG/DL (ref 70–130)
HCT VFR BLD AUTO: 33.5 % (ref 34–46.6)
HCT VFR BLD AUTO: 34 % (ref 34–46.6)
HGB BLD-MCNC: 10.5 G/DL (ref 12–15.9)
HGB BLD-MCNC: 10.6 G/DL (ref 12–15.9)
IMM GRANULOCYTES # BLD AUTO: 0.01 10*3/MM3 (ref 0–0.05)
IMM GRANULOCYTES # BLD AUTO: 0.02 10*3/MM3 (ref 0–0.05)
IMM GRANULOCYTES NFR BLD AUTO: 0.2 % (ref 0–0.5)
IMM GRANULOCYTES NFR BLD AUTO: 0.4 % (ref 0–0.5)
LV EF 2D ECHO EST: 60 %
LYMPHOCYTES # BLD AUTO: 1.27 10*3/MM3 (ref 0.7–3.1)
LYMPHOCYTES # BLD AUTO: 1.53 10*3/MM3 (ref 0.7–3.1)
LYMPHOCYTES NFR BLD AUTO: 25.1 % (ref 19.6–45.3)
LYMPHOCYTES NFR BLD AUTO: 32.6 % (ref 19.6–45.3)
MCH RBC QN AUTO: 27.4 PG (ref 26.6–33)
MCH RBC QN AUTO: 28.3 PG (ref 26.6–33)
MCHC RBC AUTO-ENTMCNC: 30.9 G/DL (ref 31.5–35.7)
MCHC RBC AUTO-ENTMCNC: 31.6 G/DL (ref 31.5–35.7)
MCV RBC AUTO: 88.8 FL (ref 79–97)
MCV RBC AUTO: 89.6 FL (ref 79–97)
MONOCYTES # BLD AUTO: 0.39 10*3/MM3 (ref 0.1–0.9)
MONOCYTES # BLD AUTO: 0.42 10*3/MM3 (ref 0.1–0.9)
MONOCYTES NFR BLD AUTO: 8.3 % (ref 5–12)
MONOCYTES NFR BLD AUTO: 8.3 % (ref 5–12)
NEUTROPHILS # BLD AUTO: 2.41 10*3/MM3 (ref 1.7–7)
NEUTROPHILS # BLD AUTO: 3 10*3/MM3 (ref 1.7–7)
NEUTROPHILS NFR BLD AUTO: 51.2 % (ref 42.7–76)
NEUTROPHILS NFR BLD AUTO: 59.5 % (ref 42.7–76)
NRBC BLD AUTO-RTO: 0 /100 WBC (ref 0–0.2)
NRBC BLD AUTO-RTO: 0 /100 WBC (ref 0–0.2)
PLATELET # BLD AUTO: 199 10*3/MM3 (ref 140–450)
PLATELET # BLD AUTO: 205 10*3/MM3 (ref 140–450)
PMV BLD AUTO: 10.5 FL (ref 6–12)
PMV BLD AUTO: 10.6 FL (ref 6–12)
RBC # BLD AUTO: 3.74 10*6/MM3 (ref 3.77–5.28)
RBC # BLD AUTO: 3.83 10*6/MM3 (ref 3.77–5.28)
WBC NRBC COR # BLD: 4.7 10*3/MM3 (ref 3.4–10.8)
WBC NRBC COR # BLD: 5.05 10*3/MM3 (ref 3.4–10.8)

## 2019-05-16 PROCEDURE — 25010000002 MIDAZOLAM PER 1 MG: Performed by: INTERNAL MEDICINE

## 2019-05-16 PROCEDURE — 85025 COMPLETE CBC W/AUTO DIFF WBC: CPT | Performed by: PSYCHIATRY & NEUROLOGY

## 2019-05-16 PROCEDURE — 93320 DOPPLER ECHO COMPLETE: CPT | Performed by: INTERNAL MEDICINE

## 2019-05-16 PROCEDURE — 85730 THROMBOPLASTIN TIME PARTIAL: CPT | Performed by: PSYCHIATRY & NEUROLOGY

## 2019-05-16 PROCEDURE — 82962 GLUCOSE BLOOD TEST: CPT

## 2019-05-16 PROCEDURE — 93312 ECHO TRANSESOPHAGEAL: CPT

## 2019-05-16 PROCEDURE — 63710000001 INSULIN LISPRO (HUMAN) PER 5 UNITS: Performed by: HOSPITALIST

## 2019-05-16 PROCEDURE — 99152 MOD SED SAME PHYS/QHP 5/>YRS: CPT

## 2019-05-16 PROCEDURE — B24BZZ4 ULTRASONOGRAPHY OF HEART WITH AORTA, TRANSESOPHAGEAL: ICD-10-PCS | Performed by: INTERNAL MEDICINE

## 2019-05-16 PROCEDURE — 93312 ECHO TRANSESOPHAGEAL: CPT | Performed by: INTERNAL MEDICINE

## 2019-05-16 PROCEDURE — 99233 SBSQ HOSP IP/OBS HIGH 50: CPT | Performed by: INTERNAL MEDICINE

## 2019-05-16 PROCEDURE — 93325 DOPPLER ECHO COLOR FLOW MAPG: CPT | Performed by: INTERNAL MEDICINE

## 2019-05-16 PROCEDURE — 25010000002 FENTANYL CITRATE (PF) 100 MCG/2ML SOLUTION: Performed by: INTERNAL MEDICINE

## 2019-05-16 PROCEDURE — 25010000002 HEPARIN (PORCINE) PER 1000 UNITS: Performed by: PSYCHIATRY & NEUROLOGY

## 2019-05-16 PROCEDURE — 93320 DOPPLER ECHO COMPLETE: CPT

## 2019-05-16 PROCEDURE — 99233 SBSQ HOSP IP/OBS HIGH 50: CPT | Performed by: NURSE PRACTITIONER

## 2019-05-16 PROCEDURE — 93325 DOPPLER ECHO COLOR FLOW MAPG: CPT

## 2019-05-16 RX ORDER — AMLODIPINE BESYLATE 5 MG/1
5 TABLET ORAL
Status: DISCONTINUED | OUTPATIENT
Start: 2019-05-16 | End: 2019-05-16

## 2019-05-16 RX ORDER — ATORVASTATIN CALCIUM 20 MG/1
40 TABLET, FILM COATED ORAL NIGHTLY
Status: DISCONTINUED | OUTPATIENT
Start: 2019-05-16 | End: 2019-05-18 | Stop reason: HOSPADM

## 2019-05-16 RX ORDER — AMLODIPINE BESYLATE 5 MG/1
5 TABLET ORAL ONCE
Status: COMPLETED | OUTPATIENT
Start: 2019-05-16 | End: 2019-05-16

## 2019-05-16 RX ORDER — AMLODIPINE BESYLATE 10 MG/1
10 TABLET ORAL
Status: DISCONTINUED | OUTPATIENT
Start: 2019-05-17 | End: 2019-05-18 | Stop reason: HOSPADM

## 2019-05-16 RX ORDER — FENTANYL CITRATE 50 UG/ML
INJECTION, SOLUTION INTRAMUSCULAR; INTRAVENOUS
Status: COMPLETED | OUTPATIENT
Start: 2019-05-16 | End: 2019-05-16

## 2019-05-16 RX ORDER — MIDAZOLAM HYDROCHLORIDE 1 MG/ML
INJECTION INTRAMUSCULAR; INTRAVENOUS
Status: COMPLETED | OUTPATIENT
Start: 2019-05-16 | End: 2019-05-16

## 2019-05-16 RX ADMIN — MIDAZOLAM 2 MG: 1 INJECTION INTRAMUSCULAR; INTRAVENOUS at 12:47

## 2019-05-16 RX ADMIN — MIDAZOLAM 2 MG: 1 INJECTION INTRAMUSCULAR; INTRAVENOUS at 12:50

## 2019-05-16 RX ADMIN — DORZOLAMIDE HYDROCHLORIDE 1 DROP: 20 SOLUTION/ DROPS OPHTHALMIC at 20:22

## 2019-05-16 RX ADMIN — AMLODIPINE BESYLATE 5 MG: 5 TABLET ORAL at 16:16

## 2019-05-16 RX ADMIN — NEBIVOLOL HYDROCHLORIDE 20 MG: 10 TABLET ORAL at 09:09

## 2019-05-16 RX ADMIN — SODIUM CHLORIDE, PRESERVATIVE FREE 3 ML: 5 INJECTION INTRAVENOUS at 09:09

## 2019-05-16 RX ADMIN — INSULIN LISPRO 2 UNITS: 100 INJECTION, SOLUTION INTRAVENOUS; SUBCUTANEOUS at 17:50

## 2019-05-16 RX ADMIN — DORZOLAMIDE HYDROCHLORIDE 1 DROP: 20 SOLUTION/ DROPS OPHTHALMIC at 09:10

## 2019-05-16 RX ADMIN — BRIMONIDINE TARTRATE 1 DROP: 2 SOLUTION OPHTHALMIC at 09:10

## 2019-05-16 RX ADMIN — FENTANYL CITRATE 25 MCG: 50 INJECTION INTRAMUSCULAR; INTRAVENOUS at 12:47

## 2019-05-16 RX ADMIN — BRIMONIDINE TARTRATE 1 DROP: 2 SOLUTION OPHTHALMIC at 20:22

## 2019-05-16 RX ADMIN — SODIUM CHLORIDE 75 ML/HR: 9 INJECTION, SOLUTION INTRAVENOUS at 18:42

## 2019-05-16 RX ADMIN — HEPARIN SODIUM 16 UNITS/KG/HR: 10000 INJECTION, SOLUTION INTRAVENOUS at 12:07

## 2019-05-16 RX ADMIN — ATORVASTATIN CALCIUM 40 MG: 20 TABLET, FILM COATED ORAL at 20:22

## 2019-05-16 RX ADMIN — HEPARIN SODIUM 16 UNITS/KG/HR: 10000 INJECTION, SOLUTION INTRAVENOUS at 00:41

## 2019-05-16 RX ADMIN — ASPIRIN 81 MG: 81 TABLET, CHEWABLE ORAL at 09:09

## 2019-05-16 RX ADMIN — FENTANYL CITRATE 25 MCG: 50 INJECTION INTRAMUSCULAR; INTRAVENOUS at 12:50

## 2019-05-16 RX ADMIN — AMLODIPINE BESYLATE 5 MG: 5 TABLET ORAL at 20:22

## 2019-05-16 RX ADMIN — LIDOCAINE HYDROCHLORIDE 10 ML: 20 SOLUTION ORAL; TOPICAL at 12:39

## 2019-05-16 RX ADMIN — LOSARTAN POTASSIUM: 50 TABLET, FILM COATED ORAL at 09:09

## 2019-05-16 NOTE — PROGRESS NOTES
"    Patient Name: Landy Workman  :1945  74 y.o.      Patient Care Team:  Braulio Beyer MD as PCP - General (Nephrology)    Chief Complaint: CVA, HTN    Interval History: for DANY today       Objective   Vital Signs  Temp:  [97.6 °F (36.4 °C)-98.6 °F (37 °C)] 97.6 °F (36.4 °C)  Heart Rate:  [56-74] 61  Resp:  [14-18] 18  BP: (164-215)/() 164/68    Intake/Output Summary (Last 24 hours) at 2019 1402  Last data filed at 2019 0610  Gross per 24 hour   Intake 1938 ml   Output --   Net 1938 ml     Flowsheet Rows      First Filed Value   Admission Height  165.1 cm (65\") Documented at 2019 1635   Admission Weight  66.5 kg (146 lb 9.6 oz) Documented at 2019 1635          Physical Exam:   General Appearance:    Alert, cooperative, in no acute distress   Lungs:     Clear to auscultation.  Normal respiratory effort and rate.      Heart:    Regular rhythm and normal rate, normal S1 and S2, no murmurs, gallops or rubs.     Chest Wall:    No abnormalities observed   Abdomen:     Soft, nontender, positive bowel sounds.     Extremities:   no cyanosis, clubbing or edema.  No marked joint deformities.  Adequate musculoskeletal strength.       Results Review:    Results from last 7 days   Lab Units 05/15/19  0534   SODIUM mmol/L 142   POTASSIUM mmol/L 4.1   CHLORIDE mmol/L 107   CO2 mmol/L 26.8   BUN mg/dL 15   CREATININE mg/dL 0.69   GLUCOSE mg/dL 131*   CALCIUM mg/dL 8.9     Results from last 7 days   Lab Units 05/15/19  0534 19  1701   TROPONIN T ng/mL 0.322* 0.330*     Results from last 7 days   Lab Units 19  0728   WBC 10*3/mm3 5.05   HEMOGLOBIN g/dL 10.5*   HEMATOCRIT % 34.0   PLATELETS 10*3/mm3 205     Results from last 7 days   Lab Units 19  0547 05/15/19  2345 05/15/19  1702 05/15/19  0810   INR   --   --   --  0.99   APTT seconds 75.3* 56.6* 42.5* 30.7         Results from last 7 days   Lab Units 05/15/19  0534   CHOLESTEROL mg/dL 136   TRIGLYCERIDES mg/dL 50 "   HDL CHOL mg/dL 52   LDL CHOL mg/dL 74               Medication Review:     aspirin 81 mg Oral Daily   atorvastatin 40 mg Oral Nightly   brimonidine 1 drop Both Eyes BID   dorzolamide 1 drop Both Eyes BID   insulin lispro 0-7 Units Subcutaneous 4x Daily With Meals & Nightly   losartan-HCTZ (HYZAAR) 100-12.5 combo dose  Oral Daily   nebivolol 20 mg Oral Daily   sodium chloride 3 mL Intravenous Q12H          heparin (porcine) 12 Units/kg/hr Last Rate: 16 Units/kg/hr (05/16/19 1207)   niCARdipine 5-15 mg/hr Last Rate: Stopped (05/15/19 0040)   sodium chloride 75 mL/hr Last Rate: 75 mL/hr (05/15/19 1357)       Assessment/Plan   Active Hospital Problems    Diagnosis  POA   • **Acute cerebrovascular accident (CVA) of cerebellum (CMS/HCC) [I63.9]  Unknown   • Hypertensive crisis [I16.9]  Yes   • Diabetes mellitus (CMS/HCC) [E11.9]  Unknown   • Hyperlipidemia [E78.5]  Unknown   • Hypertension [I10]  Unknown   • Elevated troponin [R74.8]  Unknown      Resolved Hospital Problems   No resolved problems to display.     1. CVA- for DANY today  2. Elevated HTN- /93, will discuss with Dr Perez but add amlodipine.  Only 5 mg since neuro is managing BP target with acute CVA  3. Elevated cTn- felt to be secondary to CVA, denies GIBSON Mooney, III, MD  [facility]  05/16/19  2:02 PM

## 2019-05-16 NOTE — PROGRESS NOTES
[facility]     LOS: 2 days     Name: Landy Workman  Age: 74 y.o.  Sex: female  :  1945  MRN: [id number]         Primary Care Physician: Braulio Beyer MD    NPO Diet    Subjective   no complaints. vision stays improved.    Review of Systems   Respiratory: Negative for shortness of breath.    Cardiovascular: Negative for chest pain.     Objective   Temp:  [97.6 °F (36.4 °C)-98.6 °F (37 °C)] 97.6 °F (36.4 °C)  Heart Rate:  [58-76] 63  Resp:  [16] 16  BP: (130-215)/(74-93) 213/93  SpO2:  [94 %-98 %] 97 %  on   ;   Device (Oxygen Therapy): room air  Body mass index is 25.53 kg/m².    Physical Exam   Constitutional: She is oriented to person, place, and time. No distress.   Cardiovascular: Normal rate and regular rhythm.   Pulmonary/Chest: Effort normal and breath sounds normal. No respiratory distress.   Abdominal: Soft. Bowel sounds are normal. There is no tenderness. There is no rebound and no guarding.   Musculoskeletal: She exhibits no edema.   Neurological: She is alert and oriented to person, place, and time.   Skin: Skin is warm and dry.   Psychiatric: She has a normal mood and affect. Her behavior is normal.     Reviewed medications and new clinical results    aspirin 81 mg Oral Daily   atorvastatin 20 mg Oral Nightly   brimonidine 1 drop Both Eyes BID   dorzolamide 1 drop Both Eyes BID   insulin lispro 0-7 Units Subcutaneous 4x Daily With Meals & Nightly   losartan-HCTZ (HYZAAR) 100-12.5 combo dose  Oral Daily   nebivolol 20 mg Oral Daily   sodium chloride 3 mL Intravenous Q12H       heparin (porcine) 12 Units/kg/hr Last Rate: 16 Units/kg/hr (19 0630)   niCARdipine 5-15 mg/hr Last Rate: Stopped (05/15/19 0040)   sodium chloride 75 mL/hr Last Rate: 75 mL/hr (05/15/19 1357)     Results from last 7 days   Lab Units 19  0728 19  0547 05/15/19  0534 19  1701   WBC 10*3/mm3 5.05 4.70 5.04  4.78 5.03   HEMOGLOBIN g/dL  10.5* 10.6* 11.0*  11.0* 11.2*   PLATELETS 10*3/mm3 205 199 212  208 240     Results from last 7 days   Lab Units 05/15/19  0534 05/14/19  1701   SODIUM mmol/L 142 133*   POTASSIUM mmol/L 4.1 3.9   CHLORIDE mmol/L 107 97*   CO2 mmol/L 26.8 27.7   BUN mg/dL 15 19   CREATININE mg/dL 0.69 0.96   CALCIUM mg/dL 8.9 9.1   GLUCOSE mg/dL 131* 116*     Lab Results   Component Value Date    ANIONGAP 8.2 05/15/2019     Glucose   Date/Time Value Ref Range Status   05/16/2019 0617 133 (H) 70 - 130 mg/dL Final   05/15/2019 2125 177 (H) 70 - 130 mg/dL Final   05/15/2019 1548 187 (H) 70 - 130 mg/dL Final   05/15/2019 1056 178 (H) 70 - 130 mg/dL Final   05/15/2019 0555 113 70 - 130 mg/dL Final     Hemoglobin A1C   Date Value Ref Range Status   05/15/2019 6.30 (H) 4.80 - 5.60 % Final     Estimated Creatinine Clearance: 59.5 mL/min (by C-G formula based on SCr of 0.69 mg/dL).    Assessment/Plan   Active Hospital Problems    Diagnosis  POA   • **Acute cerebrovascular accident (CVA) of cerebellum (CMS/[facility]) [I63.9]  Unknown   • Hypertensive crisis [I16.9]  Yes   • Diabetes mellitus (CMS/[facility]) [E11.9]  Unknown   • Hyperlipidemia [E78.5]  Unknown   • Hypertension [I10]  Unknown   • Elevated troponin [R74.8]  Unknown      Resolved Hospital Problems   No resolved problems to display.     74 y.o. female admitted with left eye vision changes    · Left central retinal artery occlusion, acute strokes likely embolic: Aspirin, statin, heparin drip.  2D echocardiogram: Noted.  DANY tomorrow.  · Hypertension: Slowly normalize blood pressure per cardiology  · Elevated troponin: Possible stress test as an outpatient  · diabetes mellitus type 2: A1c 6.30.  Control is fair here.  · disposition to be determined  · discussed with patient, family and nursing staff.    Gilberto Maria MD   05/16/19  11:07 AM

## 2019-05-16 NOTE — NURSING NOTE
Received referral through stroke order set. Based on therapy evals, no determined need for inpatient program. Will sign off. Thanks, Diane BAEZ rehab admission nurse [phone]

## 2019-05-16 NOTE — PROGRESS NOTES
"DOS: 2019  NAME: Landy Workman   : 1945  PCP: Braulio Beyer MD  Chief Complaint   Patient presents with   • Blurred Vision     Patient seen in follow-up today; new to me      Subjective: No events overnight. She denies any complaints on my exam. She reports that she is essentially back to her baseline minus some intermittent visual spots. Awating DANY later today.       Daughter at bedside.     Objective:  Vital signs: BP (!) 213/93   Pulse 63   Temp 97.6 °F (36.4 °C) (Oral)   Resp 16   Ht 165.1 cm (65\")   Wt 69.6 kg (153 lb 6.4 oz)   SpO2 97%   BMI 25.53 kg/m²       HEENT: Normocephalic, atraumatic   COR: RRR  Resp: Even and unlabored  Extremities: Equal pulses, non distal embolization    Neurological:   MS: AO. Language normal. No neglect. Higher integrative function normal  CN: II-XII normal  Motor: 5/5, normal tone  Reflexes: Toes downgoing   Sensory: Intact  Coordination: Normal    Laboratory results:  Lab Results   Component Value Date    GLUCOSE 131 (H) 05/15/2019    CALCIUM 8.9 05/15/2019     05/15/2019    K 4.1 05/15/2019    CO2 26.8 05/15/2019     05/15/2019    BUN 15 05/15/2019    CREATININE 0.69 05/15/2019    EGFRIFNONA 83 05/15/2019    BCR 21.7 05/15/2019    ANIONGAP 8.2 05/15/2019     Lab Results   Component Value Date    WBC 5.05 2019    HGB 10.5 (L) 2019    HCT 34.0 2019    MCV 88.8 2019     2019     Lab Results   Component Value Date    CHOL 136 05/15/2019     Lab Results   Component Value Date    HDL 52 05/15/2019     Lab Results   Component Value Date    LDL 74 05/15/2019     Lab Results   Component Value Date    TRIG 50 05/15/2019     No results found for: TSH  No results found for: [id number]  Lab Results   Component Value Date    HGBA1C 6.30 (H) 05/15/2019     Lab Results   Component Value Date    CHOL 136 05/15/2019     Lab Results   Component Value Date    TRIG 50 05/15/2019     Lab Results   Component Value Date "    HDL 52 05/15/2019     Lab Results   Component Value Date    LDL 74 05/15/2019       Review and interpretation of imaging:  Interpretation Summary     · Calculated EF = 54%. Estimated EF was in agreement with the calculated EF. Normal left ventricular cavity size noted. All left ventricular wall segments contract normally.  · Left ventricular wall thickness is consistent with mild concentric hypertrophy.  · Left ventricular diastolic dysfunction is noted (grade II w/high LAP) consistent with pseudonormalization.  · There is moderate calcification of the aortic valve.  · Mild aortic valve regurgitation is present.  · The mitral valve is markedly abnormal. There is calcification and retracted chordae of the posterior mitral valve leaflet. There is a very large echogenic mass in the area of the posterior mitral annulus that may just represent calcification however there is a mobile echogenic structure attached to the left ventricular side of that area.  · Mild mitral valve regurgitation is present.     BRAIN MRI WITH AND WITHOUT CONTRAST     HISTORY: Cerebral ischemia; I16.9-Hypertensive crisis, unspecified     COMPARISON: None.     FINDINGS:  Multiplanar images of the head were obtained without and with  gadolinium. Mild atrophy. There are moderately extensive scattered foci  of increased T2 and FLAIR signal abnormality in the white matter  bilaterally consistent with chronic ischemic gliotic changes. There are  multiple tiny bilateral ovoid and rounded foci of increased diffusion  signal compatible with tiny acute infarcts. The largest is located along  the lower dorsal left thalamus on image 46 but only measures 6.2 mm.  There is a slightly smaller, 4.8 mm focus in the upper right thalamus  more anteriorly. Other foci are 4 mm or less and are present diffusely  in the cerebral hemispheres but slightly more abundant to the left of  midline. There may be a tiny punctate focus in the left cerebellum on  image 37. An  embolic etiology is felt to be most likely.     There is some dilated perivascular spaces bilaterally. There is no  hydrocephalus. Midline structures corpus callosum, pituitary gland, and  brainstem are unremarkable. There is no evidence of extra axial mass or  fluid collection. The orbital and encompassed paranasal soft tissues are  unremarkable. There is no abnormal contrast-enhancement.     IMPRESSION:  Numerous tiny foci of acute ischemia, left greater than  right most likely embolic in nature given their bilaterality and  multivascular distribution.     CT OF THE BRAIN WITHOUT CONTRAST 05/14/2019     HISTORY: Peripheral vision disturbance.     Axial images were obtained through the brain without intravenous  contrast. There is mild diffuse atrophy. There is mild decreased  attenuation of the periventricular white matter consistent with mild  small vessel white matter ischemic disease. Small old lacunar infarction  is seen in the right thalamus.     There is no evidence of acute infarction, hemorrhage, midline shift or  mass effect.     No bony abnormalities are seen.     IMPRESSION:  1. Evidence of old ischemic disease.  2. No acute process identified.     Radiation dose reduction techniques were utilized, including automated  exposure control and exposure modulation based on body size.     This report was finalized on 5/14/2019 8:34 PM by Dr. Stuart Dubois M.D.     ONE VIEW PORTABLE CHEST     HISTORY: Visual disturbance. Hypertension.     The lungs are well-expanded and clear. There is mild cardiomegaly. No  acute abnormality is seen.     This report was finalized on 5/14/2019 7:36 PM by Dr. Juan C Norman M.D.       Impression: This is a 74-year-old female with history of hyperlipidemia, hypertension and non-insulin-dependent diabetes who presented to [facility] on May 14, 2019 due to complaint of the development of vision changes in the left eye which started on Saturday, May 11th.  CT  of the head was negative for acute findings in the emergency room.  MRI of the brain showed bilateral embolic appearing infarcts.  MRA of the head neck were essentially unremarkable.  EKG normal sinus rhythm.  TTE revealed ejection fraction 54%.  LV/LA normal.  No evidence of PFO noted.  Cardiology consulted for DANY due to concern for cardioembolic source not seen on TTE.  DANY planned for later today.  Will await findings and advised further thereafter.  Therapies as written.  CCP to assist with discharge planning. Call RRT for any acute neurological changes and/or concerns. We will continue to follow and advise.       Impression:  1) left central retinal artery occlusion  2) stroke bilateral middle cerebral artery and posterior cerebral arteries, cardioembolic  3) essential hypertension  4) mixed hyperlipidemia        Plan:  Heparin drip; cardiac dosing (no boluses ever)  ASA 81mg daily (not on PTA)   Lipitor 40mg daily x 3 month then decrease 20mg daily (LDL 74)   Neuroccks  BP control  Stroke Education  BRITTANY/SCDs  PT/OT/ST      Case reviewed with Dr. Danilo Christy and he agrees w/ plan above.     GETACHEW Hopkins

## 2019-05-16 NOTE — PLAN OF CARE
Problem: Patient Care Overview  Goal: Plan of Care Review  Outcome: Ongoing (interventions implemented as appropriate)   05/16/19 0451   Coping/Psychosocial   Plan of Care Reviewed With patient   Plan of Care Review   Progress no change   OTHER   Outcome Summary NIHSS 2 for extinction to LLE and slight facial assymetry. Pt remains on heparin drip. NPO @ MN for DANY later this date. Uneventful night.      Goal: Individualization and Mutuality  Outcome: Ongoing (interventions implemented as appropriate)      Problem: Stroke (Ischemic) (Adult)  Goal: Signs and Symptoms of Listed Potential Problems Will be Absent, Minimized or Managed (Stroke)  Outcome: Ongoing (interventions implemented as appropriate)

## 2019-05-17 ENCOUNTER — APPOINTMENT (OUTPATIENT)
Dept: CARDIOLOGY | Facility: HOSPITAL | Age: 74
End: 2019-05-17

## 2019-05-17 LAB
APTT PPP: 80.3 SECONDS (ref 22.7–35.4)
BASOPHILS # BLD AUTO: 0.06 10*3/MM3 (ref 0–0.2)
BASOPHILS NFR BLD AUTO: 0.9 % (ref 0–1.5)
BH CV ECHO MEAS - DIST REN A EDV LEFT: 12.3 CM/SEC
BH CV ECHO MEAS - DIST REN A PSV LEFT: 76 CM/SEC
BH CV ECHO MEAS - DIST REN A RI LEFT: 0.84
BH CV ECHO MEAS - MID REN A EDV LEFT: 12.5 CM/SEC
BH CV ECHO MEAS - MID REN A PSV LEFT: 77.4 CM/SEC
BH CV ECHO MEAS - MID REN A RI LEFT: 0.84
BH CV ECHO MEAS - PROX REN A EDV LEFT: 10.9 CM/SEC
BH CV ECHO MEAS - PROX REN A PSV LEFT: 112 CM/SEC
BH CV ECHO MEAS - PROX REN A RI LEFT: 0.9
BH CV VAS BP LEFT ARM: NORMAL MMHG
BH CV VAS BP RIGHT ARM: NORMAL MMHG
BH CV VAS RENAL AORTIC MID PSV: 127 CM/S
BH CV VAS RENAL HILUM LEFT EDV: 10 CM/S
BH CV VAS RENAL HILUM LEFT PSV: 46 CM/S
BH CV VAS RENAL HILUM RIGHT EDV: 4.4 CM/S
BH CV VAS RENAL HILUM RIGHT PSV: 36 CM/S
BH CV XLRA MEAS - KID L LEFT: 11 CM
BH CV XLRA MEAS - RENAL A ORG RI LEFT: 0.8
BH CV XLRA MEAS DIST REN A EDV RIGHT: 11.8 CM/SEC
BH CV XLRA MEAS DIST REN A PSV RIGHT: 64.1 CM/SEC
BH CV XLRA MEAS DIST REN A RI RIGHT: 0.82
BH CV XLRA MEAS KID L RIGHT: 11.6 CM
BH CV XLRA MEAS KID W RIGHT: 4.7 CM
BH CV XLRA MEAS MID REN A EDV RIGHT: 21.6 CM/SEC
BH CV XLRA MEAS MID REN A PSV RIGHT: 151 CM/SEC
BH CV XLRA MEAS MID REN A RI RIGHT: 0.86
BH CV XLRA MEAS PROX REN A EDV RIGHT: 13.2 CM/SEC
BH CV XLRA MEAS PROX REN A PSV RIGHT: 115 CM/SEC
BH CV XLRA MEAS PROX REN A RI RIGHT: 0.89
BH CV XLRA MEAS RAR LEFT: 1.1
BH CV XLRA MEAS RAR RIGHT: 1.2
BH CV XLRA MEAS RENAL A ORG EDV LEFT: 21.3 CM/SEC
BH CV XLRA MEAS RENAL A ORG EDV RIGHT: 13.4 CM/SEC
BH CV XLRA MEAS RENAL A ORG PSV LEFT: 106 CM/SEC
BH CV XLRA MEAS RENAL A ORG PSV RIGHT: 94.1 CM/SEC
BH CV XLRA MEAS RENAL A ORG RI RIGHT: 0.86
DEPRECATED RDW RBC AUTO: 46.3 FL (ref 37–54)
EOSINOPHIL # BLD AUTO: 0.38 10*3/MM3 (ref 0–0.4)
EOSINOPHIL NFR BLD AUTO: 5.8 % (ref 0.3–6.2)
ERYTHROCYTE [DISTWIDTH] IN BLOOD BY AUTOMATED COUNT: 14.3 % (ref 12.3–15.4)
GLUCOSE BLDC GLUCOMTR-MCNC: 131 MG/DL (ref 70–130)
GLUCOSE BLDC GLUCOMTR-MCNC: 210 MG/DL (ref 70–130)
GLUCOSE BLDC GLUCOMTR-MCNC: 247 MG/DL (ref 70–130)
GLUCOSE BLDC GLUCOMTR-MCNC: 85 MG/DL (ref 70–130)
HCT VFR BLD AUTO: 37 % (ref 34–46.6)
HGB BLD-MCNC: 11.4 G/DL (ref 12–15.9)
IMM GRANULOCYTES # BLD AUTO: 0.02 10*3/MM3 (ref 0–0.05)
IMM GRANULOCYTES NFR BLD AUTO: 0.3 % (ref 0–0.5)
LEFT KIDNEY WIDTH: 5 CM
LEFT RENAL UPPER PARENCHYMA MAX: 25 CM/S
LEFT RENAL UPPER PARENCHYMA MIN: 4.4 CM/S
LEFT RENAL UPPER PARENCHYMA RI: 0.82
LYMPHOCYTES # BLD AUTO: 1.8 10*3/MM3 (ref 0.7–3.1)
LYMPHOCYTES NFR BLD AUTO: 27.7 % (ref 19.6–45.3)
MCH RBC QN AUTO: 27.3 PG (ref 26.6–33)
MCHC RBC AUTO-ENTMCNC: 30.8 G/DL (ref 31.5–35.7)
MCV RBC AUTO: 88.7 FL (ref 79–97)
MONOCYTES # BLD AUTO: 0.53 10*3/MM3 (ref 0.1–0.9)
MONOCYTES NFR BLD AUTO: 8.2 % (ref 5–12)
NEUTROPHILS # BLD AUTO: 3.71 10*3/MM3 (ref 1.7–7)
NEUTROPHILS NFR BLD AUTO: 57.1 % (ref 42.7–76)
NRBC BLD AUTO-RTO: 0 /100 WBC (ref 0–0.2)
PLATELET # BLD AUTO: 199 10*3/MM3 (ref 140–450)
PMV BLD AUTO: 11.1 FL (ref 6–12)
RBC # BLD AUTO: 4.17 10*6/MM3 (ref 3.77–5.28)
RIGHT RENAL UPPER PARENCHYMA MAX: 32 CM/S
RIGHT RENAL UPPER PARENCHYMA MIN: 6.1 CM/S
RIGHT RENAL UPPER PARENCHYMA RI: 0.81
VIT B12 BLD-MCNC: 331 PG/ML (ref 211–946)
WBC NRBC COR # BLD: 6.5 10*3/MM3 (ref 3.4–10.8)

## 2019-05-17 PROCEDURE — 85025 COMPLETE CBC W/AUTO DIFF WBC: CPT | Performed by: PSYCHIATRY & NEUROLOGY

## 2019-05-17 PROCEDURE — 63710000001 INSULIN LISPRO (HUMAN) PER 5 UNITS: Performed by: HOSPITALIST

## 2019-05-17 PROCEDURE — 82962 GLUCOSE BLOOD TEST: CPT

## 2019-05-17 PROCEDURE — 99233 SBSQ HOSP IP/OBS HIGH 50: CPT | Performed by: NURSE PRACTITIONER

## 2019-05-17 PROCEDURE — 85730 THROMBOPLASTIN TIME PARTIAL: CPT | Performed by: PSYCHIATRY & NEUROLOGY

## 2019-05-17 PROCEDURE — 99233 SBSQ HOSP IP/OBS HIGH 50: CPT | Performed by: INTERNAL MEDICINE

## 2019-05-17 PROCEDURE — 93975 VASCULAR STUDY: CPT

## 2019-05-17 RX ORDER — HYDRALAZINE HYDROCHLORIDE 50 MG/1
50 TABLET, FILM COATED ORAL EVERY 8 HOURS SCHEDULED
Status: DISCONTINUED | OUTPATIENT
Start: 2019-05-17 | End: 2019-05-18 | Stop reason: HOSPADM

## 2019-05-17 RX ORDER — CLOPIDOGREL BISULFATE 75 MG/1
75 TABLET ORAL DAILY
Status: DISCONTINUED | OUTPATIENT
Start: 2019-05-17 | End: 2019-05-18 | Stop reason: HOSPADM

## 2019-05-17 RX ADMIN — BRIMONIDINE TARTRATE 1 DROP: 2 SOLUTION OPHTHALMIC at 21:29

## 2019-05-17 RX ADMIN — SODIUM CHLORIDE 75 ML/HR: 9 INJECTION, SOLUTION INTRAVENOUS at 08:43

## 2019-05-17 RX ADMIN — HYDRALAZINE HYDROCHLORIDE 50 MG: 50 TABLET, FILM COATED ORAL at 21:29

## 2019-05-17 RX ADMIN — NEBIVOLOL HYDROCHLORIDE 20 MG: 10 TABLET ORAL at 08:42

## 2019-05-17 RX ADMIN — CLOPIDOGREL 75 MG: 75 TABLET, FILM COATED ORAL at 12:03

## 2019-05-17 RX ADMIN — SODIUM CHLORIDE, PRESERVATIVE FREE 3 ML: 5 INJECTION INTRAVENOUS at 08:43

## 2019-05-17 RX ADMIN — SODIUM CHLORIDE, PRESERVATIVE FREE 3 ML: 5 INJECTION INTRAVENOUS at 21:29

## 2019-05-17 RX ADMIN — LOSARTAN POTASSIUM: 50 TABLET, FILM COATED ORAL at 08:42

## 2019-05-17 RX ADMIN — DORZOLAMIDE HYDROCHLORIDE 1 DROP: 20 SOLUTION/ DROPS OPHTHALMIC at 08:41

## 2019-05-17 RX ADMIN — HYDRALAZINE HYDROCHLORIDE 50 MG: 50 TABLET, FILM COATED ORAL at 16:08

## 2019-05-17 RX ADMIN — ATORVASTATIN CALCIUM 40 MG: 20 TABLET, FILM COATED ORAL at 21:29

## 2019-05-17 RX ADMIN — BRIMONIDINE TARTRATE 1 DROP: 2 SOLUTION OPHTHALMIC at 08:41

## 2019-05-17 RX ADMIN — DORZOLAMIDE HYDROCHLORIDE 1 DROP: 20 SOLUTION/ DROPS OPHTHALMIC at 21:29

## 2019-05-17 RX ADMIN — ASPIRIN 81 MG: 81 TABLET, CHEWABLE ORAL at 08:42

## 2019-05-17 RX ADMIN — AMLODIPINE BESYLATE 10 MG: 10 TABLET ORAL at 08:45

## 2019-05-17 RX ADMIN — INSULIN LISPRO 3 UNITS: 100 INJECTION, SOLUTION INTRAVENOUS; SUBCUTANEOUS at 12:03

## 2019-05-17 RX ADMIN — LOSARTAN POTASSIUM: 50 TABLET, FILM COATED ORAL at 12:03

## 2019-05-17 NOTE — PROGRESS NOTES
[facility]     LOS: 3 days     Name: Landy Workman  Age: 74 y.o.  Sex: female  :  1945  MRN: [id number]         Primary Care Physician: Braulio Beyer MD    NPO Diet    Subjective   no complaints. vision states almost back to normal.    Review of Systems   Respiratory: Negative for shortness of breath.    Cardiovascular: Negative for chest pain.     Objective   Temp:  [98 °F (36.7 °C)-98.5 °F (36.9 °C)] 98.5 °F (36.9 °C)  Heart Rate:  [55-66] 58  Resp:  [18] 18  BP: (155-219)/() 155/71  SpO2:  [91 %-97 %] 97 %  on   ;   Device (Oxygen Therapy): room air  Body mass index is 24.63 kg/m².    Physical Exam   Constitutional: She is oriented to person, place, and time. No distress.   Cardiovascular: Normal rate and regular rhythm.   Pulmonary/Chest: Effort normal and breath sounds normal. No respiratory distress.   Abdominal: Soft. Bowel sounds are normal. There is no tenderness. There is no rebound and no guarding.   Musculoskeletal: She exhibits no edema.   Neurological: She is alert and oriented to person, place, and time.   Skin: Skin is warm and dry.   Psychiatric: She has a normal mood and affect. Her behavior is normal.     Reviewed medications and new clinical results    amLODIPine 10 mg Oral Q24H   aspirin 81 mg Oral Daily   atorvastatin 40 mg Oral Nightly   brimonidine 1 drop Both Eyes BID   clopidogrel 75 mg Oral Daily   dorzolamide 1 drop Both Eyes BID   hydrALAZINE 50 mg Oral Q8H   insulin lispro 0-7 Units Subcutaneous 4x Daily With Meals & Nightly   losartan-HCTZ (HYZAAR) 100-25 combo dose  Oral Daily   nebivolol 20 mg Oral Daily   sodium chloride 3 mL Intravenous Q12H       niCARdipine 5-15 mg/hr Last Rate: Stopped (05/15/19 0040)   sodium chloride 75 mL/hr Last Rate: 75 mL/hr (19 0843)     Results from last 7 days   Lab Units 19  0352 19  0728 19  0547 05/15/19  0534 19  1701   WBC 10*3/mm3 6.50  5.05 4.70 5.04  4.78 5.03   HEMOGLOBIN g/dL 11.4* 10.5* 10.6* 11.0*  11.0* 11.2*   PLATELETS 10*3/mm3 199 205 199 212  208 240     Results from last 7 days   Lab Units 05/15/19  0534 05/14/19  1701   SODIUM mmol/L 142 133*   POTASSIUM mmol/L 4.1 3.9   CHLORIDE mmol/L 107 97*   CO2 mmol/L 26.8 27.7   BUN mg/dL 15 19   CREATININE mg/dL 0.69 0.96   CALCIUM mg/dL 8.9 9.1   GLUCOSE mg/dL 131* 116*     Lab Results   Component Value Date    ANIONGAP 8.2 05/15/2019     Glucose   Date/Time Value Ref Range Status   05/17/2019 1117 210 (H) 70 - 130 mg/dL Final   05/17/2019 0554 131 (H) 70 - 130 mg/dL Final   05/16/2019 2103 128 70 - 130 mg/dL Final   05/16/2019 1635 177 (H) 70 - 130 mg/dL Final   05/16/2019 1124 133 (H) 70 - 130 mg/dL Final   05/16/2019 0617 133 (H) 70 - 130 mg/dL Final   05/15/2019 2125 177 (H) 70 - 130 mg/dL Final   05/15/2019 1548 187 (H) 70 - 130 mg/dL Final     Hemoglobin A1C   Date Value Ref Range Status   05/15/2019 6.30 (H) 4.80 - 5.60 % Final     Estimated Creatinine Clearance: 64.4 mL/min (by C-G formula based on SCr of 0.69 mg/dL).    Assessment/Plan   Active Hospital Problems    Diagnosis  POA   • **Acute cerebrovascular accident (CVA) of cerebellum (CMS/HCC) [I63.9]  Unknown   • Hypertensive crisis [I16.9]  Yes   • Diabetes mellitus (CMS/HCC) [E11.9]  Unknown   • Hyperlipidemia [E78.5]  Unknown   • Hypertension [I10]  Unknown   • Elevated troponin [R74.8]  Unknown      Resolved Hospital Problems   No resolved problems to display.     74 y.o. female admitted with left eye vision changes    · Left central retinal artery occlusion, acute strokes likely embolic: Aspirin 81 and Plavix for a month and then Plavix alone.  Lipitor 40 daily for 3 months and then 20 daily.  Follow-up with neurology 3 months.  Discharge with ZIO Patch  · Hypertension: Adjusting per cardiology.  Renal artery Doppler.  · Elevated troponin: Possible stress test as an outpatient  · diabetes mellitus type 2: A1c 6.30.  Control  is fair here.  · disposition soon  · discussed with patient, family and nursing staff.    Gilberto Maria MD   05/17/19  3:03 PM

## 2019-05-17 NOTE — PROGRESS NOTES
"Patient Name: Landy Workman  :1945  74 y.o.      Patient Care Team:  Braulio Beyer MD as PCP - General (Nephrology)    Interval History:   Status post transesophageal echocardiogram without cardiac source of embolus.    Subjective:  Following for stroke.    Objective   Vital Signs  Temp:  [97.6 °F (36.4 °C)-98.5 °F (36.9 °C)] 98.4 °F (36.9 °C)  Heart Rate:  [54-66] 62  Resp:  [14-18] 18  BP: (164-219)/() 203/87    Intake/Output Summary (Last 24 hours) at 2019 0909  Last data filed at 2019 0730  Gross per 24 hour   Intake 1879 ml   Output --   Net 1879 ml     Flowsheet Rows      First Filed Value   Admission Height  165.1 cm (65\") Documented at 2019 1635   Admission Weight  66.5 kg (146 lb 9.6 oz) Documented at 2019 1635          Physical Exam:   General Appearance:    Alert, cooperative, in no acute distress   Lungs:     Clear to auscultation.  Normal respiratory effort and rate.      Heart:    Regular rhythm and normal rate, normal S1 and S2, no murmurs, gallops or rubs.     Chest Wall:    No abnormalities observed   Abdomen:     Soft, nontender, positive bowel sounds.     Extremities:   no cyanosis, clubbing or edema.  No marked joint deformities.  Adequate musculoskeletal strength.       Results Review:    Results from last 7 days   Lab Units 05/15/19  0534   SODIUM mmol/L 142   POTASSIUM mmol/L 4.1   CHLORIDE mmol/L 107   CO2 mmol/L 26.8   BUN mg/dL 15   CREATININE mg/dL 0.69   GLUCOSE mg/dL 131*   CALCIUM mg/dL 8.9     Results from last 7 days   Lab Units 05/15/19  0534 19  1701   TROPONIN T ng/mL 0.322* 0.330*     Results from last 7 days   Lab Units 19  0352   WBC 10*3/mm3 6.50   HEMOGLOBIN g/dL 11.4*   HEMATOCRIT % 37.0   PLATELETS 10*3/mm3 199     Results from last 7 days   Lab Units 19  0352 19  0547 05/15/19  2345  05/15/19  0810   INR   --   --   --   --  0.99   APTT seconds 80.3* 75.3* 56.6*   < > 30.7    < > = values in this " interval not displayed.     Results from last 7 days   Lab Units 05/15/19  0534   CHOLESTEROL mg/dL 136         Results from last 7 days   Lab Units 05/15/19  0534   CHOLESTEROL mg/dL 136   TRIGLYCERIDES mg/dL 50   HDL CHOL mg/dL 52   LDL CHOL mg/dL 74         Medication Review:     amLODIPine 10 mg Oral Q24H   aspirin 81 mg Oral Daily   atorvastatin 40 mg Oral Nightly   brimonidine 1 drop Both Eyes BID   dorzolamide 1 drop Both Eyes BID   insulin lispro 0-7 Units Subcutaneous 4x Daily With Meals & Nightly   losartan-HCTZ (HYZAAR) 100-12.5 combo dose  Oral Daily   nebivolol 20 mg Oral Daily   sodium chloride 3 mL Intravenous Q12H          heparin (porcine) 12 Units/kg/hr Last Rate: 16 Units/kg/hr (05/17/19 0450)   niCARdipine 5-15 mg/hr Last Rate: Stopped (05/15/19 0040)   sodium chloride 75 mL/hr Last Rate: 75 mL/hr (05/17/19 0843)       Assessment/Plan     1.  Stroke.  Retinal artery occlusion.  I reviewed the images from a transesophageal echocardiogram.  She has age-related changes including mitral annular calcification and calcification of a trileaflet aortic valve.  She also has significant atheroma in the aortic arch and descending aorta.  I agree with discharge with a Zio patch.  She needs good blood pressure control.  I also think antiplatelet therapy would be a good way to go with her at this point in time with aspirin and Plavix if okay with neurology.  2.  Hypertension.  Her blood pressures remain markedly elevated despite increased doses of  3.  Hyperlipidemia    Blood pressure remains markedly high.  She is on a maximum dose of amlodipine at this point.  I do not think we can go higher on the by systolic due to relatively low heart rate.  She is pretty well maxed on losartan/hydrochlorothiazide. Add hydralazine. Check renal artery doppler.    Veronika Perez MD, [facility]  05/17/19  9:09 AM

## 2019-05-17 NOTE — PLAN OF CARE
Problem: Patient Care Overview  Goal: Plan of Care Review  Outcome: Ongoing (interventions implemented as appropriate)   05/17/19 0609   Coping/Psychosocial   Plan of Care Reviewed With patient   Plan of Care Review   Progress no change   OTHER   Outcome Summary NIHSS 1. Uneventful night. Pt anticipates discharge. Remains on heparing drip.      Goal: Individualization and Mutuality  Outcome: Ongoing (interventions implemented as appropriate)      Problem: Stroke (Ischemic) (Adult)  Goal: Signs and Symptoms of Listed Potential Problems Will be Absent, Minimized or Managed (Stroke)  Outcome: Ongoing (interventions implemented as appropriate)

## 2019-05-17 NOTE — PROGRESS NOTES
DOS: 2019  NAME: Landy Workman   : 1945  PCP: Braulio Beyer MD    Chief Complaint   Patient presents with   • Blurred Vision        Stroke    Subjective: Pt seen in follow up, however the problem is new to me. No acute events overnight, remains hypertensive.  Denies any new numbness, weakness, speech or visual disturbances, or headaches.  States her vision in her left eye is getting better she is able to read things more clearly now.  No family at bedside.    Objective:  Vital signs:      Vitals:    19 0100 19 0300 19 0500 19 0702   BP:    (!) 203/87   BP Location:    Left arm   Patient Position:    Lying   Pulse: 55 57 55 62   Resp:    18   Temp:    98.4 °F (36.9 °C)   TempSrc:    Oral   SpO2: 94% 91% 97% 96%   Weight:   67.1 kg (148 lb)    Height:           Current Facility-Administered Medications:   •  amLODIPine (NORVASC) tablet 10 mg, 10 mg, Oral, Q24H, Mee Kitchen MD, 10 mg at 19 0845  •  aspirin chewable tablet 81 mg, 81 mg, Oral, Daily, Danilo Christy MD, 81 mg at 19 0842  •  atorvastatin (LIPITOR) tablet 40 mg, 40 mg, Oral, Nightly, Tara Hayden APRN, 40 mg at 19  •  brimonidine (ALPHAGAN) 0.2 % ophthalmic solution 1 drop, 1 drop, Both Eyes, BID, Faizan Wilder MD, 1 drop at 19 0841  •  dextrose (D50W) 25 g/ 50mL Intravenous Solution 25 g, 25 g, Intravenous, Q15 Min PRN, Gilberto Maria MD  •  dextrose (GLUTOSE) oral gel 15 g, 15 g, Oral, Q15 Min PRN, Gilberto Maria MD  •  dorzolamide (TRUSOPT) 2 % ophthalmic solution 1 drop, 1 drop, Both Eyes, BID, Faizan Wilder MD, 1 drop at 19 0841  •  glucagon (human recombinant) (GLUCAGEN DIAGNOSTIC) injection 1 mg, 1 mg, Subcutaneous, PRN, Gilberto Maria MD  •  heparin 93580 units/250 mL (100 units/mL) in 0.45 % NaCl infusion, 12 Units/kg/hr, Intravenous, Titrated, ChristyDanilo moss MD, Last Rate: 10.64 mL/hr at 19 0450, 16 Units/kg/hr at  05/17/19 0450  •  hydrALAZINE (APRESOLINE) tablet 50 mg, 50 mg, Oral, Q8H, Veronika Perez MD  •  insulin lispro (humaLOG) injection 0-7 Units, 0-7 Units, Subcutaneous, 4x Daily With Meals & Nightly, Gilberto Maria MD, 2 Units at 05/16/19 1750  •  losartan (COZAAR) 100 mg, hydrochlorothiazide (HYDRODIURIL) 25 mg for HYZAAR 100-25, , Oral, Daily, Veronika Perez MD  •  nebivolol (BYSTOLIC) tablet 20 mg, 20 mg, Oral, Daily, Faizan Wilder MD, 20 mg at 05/17/19 0842  •  niCARdipine (CARDENE) 25 mg/250 mL (0.1 mg/mL) NS infusion kit, 5-15 mg/hr, Intravenous, Titrated, Danilo Christy MD, Stopped at 05/15/19 0040  •  ondansetron (ZOFRAN) injection 4 mg, 4 mg, Intravenous, Q6H PRN, Faizan Wilder MD  •  sodium chloride 0.9 % flush 3 mL, 3 mL, Intravenous, Q12H, Faizan Wilder MD, 3 mL at 05/17/19 0843  •  sodium chloride 0.9 % flush 3-10 mL, 3-10 mL, Intravenous, PRN, Faizan Wilder MD  •  sodium chloride 0.9 % infusion, 75 mL/hr, Intravenous, Continuous, Faizan Wilder MD, Last Rate: 75 mL/hr at 05/17/19 0843, 75 mL/hr at 05/17/19 0843    PRN meds  dextrose  •  dextrose  •  glucagon (human recombinant)  •  ondansetron  •  sodium chloride    No current facility-administered medications on file prior to encounter.      Current Outpatient Medications on File Prior to Encounter   Medication Sig   • atorvastatin (LIPITOR) 20 MG tablet Take 20 mg by mouth Daily.   • brimonidine (ALPHAGAN) 0.2 % ophthalmic solution Administer 1 drop to both eyes 2 (Two) Times a Day.   • dorzolamide (TRUSOPT) 2 % ophthalmic solution Administer 1 drop to both eyes 2 (Two) Times a Day.   • glipiZIDE (GLUCOTROL) 10 MG tablet Take 10 mg by mouth 2 (Two) Times a Day Before Meals.   • losartan-hydrochlorothiazide (HYZAAR) 100-12.5 MG per tablet Take 1 tablet by mouth Daily.   • metFORMIN (GLUCOPHAGE) 1000 MG tablet Take 1,000 mg by mouth 2 (Two) Times a Day With Meals.   • nebivolol (BYSTOLIC) 20 MG tablet Take 20 mg by mouth Daily.   •  pioglitazone (ACTOS) 45 MG tablet Take 45 mg by mouth Daily.       General appearance: NAD, alert and cooperative, well groomed  HEENT: Normocephalic, atraumatic, PERRL, no masses or tenderness  COR: RRR  Resp: Even and unlabored  Extremities: Equal pulses  Skin: warm, dry    Neurological:   MS: oriented x3, recent/remote memory intact, normal attention/concentration, language intact, no neglect, normal fund of knowledge  CN: visual acuity grossly normal, visual fields full, PERRL, EOMI, facial sensation equal, no facial droop, hearing symmetric, palate elevates symmetrically, shoulder shrug equal, tongue midline  Motor: 5/5 in all 4 ext.  Sensory: light touch sensation intact in all 4 ext.  Coordination: Normal finger to nose test    Laboratory results:  No results found for: TSH  Lab Results   Component Value Date    HGBA1C 6.30 (H) 05/15/2019     No results found for: [id number]  Lab Results   Component Value Date    CHOL 136 05/15/2019     Lab Results   Component Value Date    TRIG 50 05/15/2019     Lab Results   Component Value Date    HDL 52 05/15/2019     Lab Results   Component Value Date    LDL 74 05/15/2019     Lab Results   Component Value Date    WBC 6.50 05/17/2019    HGB 11.4 (L) 05/17/2019    HCT 37.0 05/17/2019    MCV 88.7 05/17/2019     05/17/2019     Lab Results   Component Value Date    GLUCOSE 131 (H) 05/15/2019    BUN 15 05/15/2019    CREATININE 0.69 05/15/2019    EGFRIFNONA 83 05/15/2019    BCR 21.7 05/15/2019    K 4.1 05/15/2019    CO2 26.8 05/15/2019    CALCIUM 8.9 05/15/2019    ALBUMIN 3.50 05/15/2019    AST 17 05/15/2019    ALT 10 05/15/2019     Lab Results   Component Value Date    PTT 80.3 (H) 05/17/2019     Lab Results   Component Value Date    INR 0.99 05/15/2019    PROTIME 12.8 05/15/2019     Brief Urine Lab Results     None          Review and interpretation of imaging:  Ct Head Without Contrast    Result Date: 5/14/2019  1. Evidence of old ischemic disease. 2. No acute process  identified.  Radiation dose reduction techniques were utilized, including automated exposure control and exposure modulation based on body size.  This report was finalized on 5/14/2019 8:34 PM by Dr. Stuart Dubois M.D.      Mri Angiogram Head Without Contrast    Result Date: 5/15/2019  1. Focal fairly high-grade narrowing of the proximal left P2. 2. Mild narrowing of the proximal right A1 which supplies both anterior cerebral arteries.  This report was finalized on 5/15/2019 3:04 AM by Mohsen Vieira M.D.      Mri Angiogram Neck With & Without Contrast    Result Date: 5/15/2019  Exam quality is somewhat degraded by patient motion. There does, however, appear to be bilateral bifurcation region stenosis, left greater than right as discussed above.  This report was finalized on 5/15/2019 3:04 AM by Mohsen Vieira M.D.      Mri Brain With & Without Contrast    Result Date: 5/17/2019  Numerous tiny foci of acute ischemia, left greater than right most likely embolic in nature given their bilaterality and multivascular distribution.  This report was finalized on 5/17/2019 5:52 AM by Mohsen Vieira M.D.      Results for orders placed during the hospital encounter of 05/14/19   Adult Transesophageal Echo (DANY) W/ Cont if Necessary Per Protocol    Narrative · Estimated EF = 60%.  · Left ventricular systolic function is normal.  · Trace-to-mild aortic valve regurgitation is present.  · Mild tricuspid valve regurgitation is present.  · Calculated right ventricular systolic pressure from tricuspid   regurgitation is 39 mmHg.        EKG with normal sinus rhythm    Impression/Assessment:  This is a 74-year-old female with past medical history of hypertension, hyperlipidemia, diabetes who presented to the hospital on 5/14/2019 with complaints of visual disturbances in the left eye that started on Saturday, 511.  Blood pressure on arrival 208/113 and heart rate 74.  She obtained a CT of head on arrival which showed no acute  intracranial abnormalities.  MRI brain obtained showing bilateral embolic appearing infarcts.  MRA head/neck with no significant stenosis.  EKG with normal sinus rhythm.  2D echo with normal LV/LA size and function, EF 54%, no PFO noted.  DANY showing age-related changes including mitral annular calcification and calcification of a trileaflet aortic valve.  Cardiology following, and also notes she has significant atheroma in the aortic arch and descending aorta.    1.  Left central retinal artery occlusion  2.  Stroke bilateral middle cerebral artery and posterior cerebral arteries, suspect cardioembolic  3.  Essential hypertension  4.  Mixed hyperlipidemia    DANY results noted.  Dr. Perez recommends patient be on dual antiplatelet therapy and cardiac monitoring at discharge.  I will add Plavix 75 mg to aspirin 81 mg.  She will need to remain on DAPTx1 month and then Plavix alone.  Continue statin.  Normalize BP.  Currently on 3 different agents.  Follow-up with me in our office in 3 months for stroke follow-up. B12 pending, if low please replace.  Follow-up with her ophthalmologist in 2 weeks, patient states she already has an appointment scheduled.  Therapies as written. CCP for discharge planning. Call RRT for any acute neurological changes. We will sign off, will see again per request.     Plan:  Discontinue heparin gtt  Zio patch at discharge   Aspirin 81 mg and Plavix 75 mg x1 month and then Plavix alone  Lipitor 40mg daily x3 months and then decrease to 20mg daily, LDL 74  Neurochecks per stroke protocol  Normalize BP  Stroke Education  BRITTANY/SCDs  PT/OT/ST  Follow-up with me in our office in 3 months for stroke follow-up  We will sign off, will see again per request.    Case discussed with patient and Dr. Christy, and he agrees with plan above.   GETACHEW Carrasco    **Blossom Disclaimer:**  Much of this encounter note is an electronic transcription/translation of spoken language to printed text. The electronic  translation of spoken language may permit erroneous, or at times, nonsensical words or phrases to be inadvertently transcribed. Although I have reviewed the note for such errors, some may still exist.

## 2019-05-18 ENCOUNTER — APPOINTMENT (OUTPATIENT)
Dept: CARDIOLOGY | Facility: HOSPITAL | Age: 74
End: 2019-05-18

## 2019-05-18 VITALS
OXYGEN SATURATION: 98 % | WEIGHT: 143.4 LBS | HEART RATE: 78 BPM | SYSTOLIC BLOOD PRESSURE: 139 MMHG | RESPIRATION RATE: 18 BRPM | DIASTOLIC BLOOD PRESSURE: 61 MMHG | TEMPERATURE: 98.6 F | BODY MASS INDEX: 23.89 KG/M2 | HEIGHT: 65 IN

## 2019-05-18 LAB
GLUCOSE BLDC GLUCOMTR-MCNC: 144 MG/DL (ref 70–130)
GLUCOSE BLDC GLUCOMTR-MCNC: 180 MG/DL (ref 70–130)

## 2019-05-18 PROCEDURE — 0296T HC EXT ECG > 48HR TO 21 DAY RCRD W/CONECT INTL RCRD: CPT

## 2019-05-18 PROCEDURE — 82962 GLUCOSE BLOOD TEST: CPT

## 2019-05-18 PROCEDURE — 99232 SBSQ HOSP IP/OBS MODERATE 35: CPT | Performed by: INTERNAL MEDICINE

## 2019-05-18 PROCEDURE — 63710000001 INSULIN LISPRO (HUMAN) PER 5 UNITS: Performed by: HOSPITALIST

## 2019-05-18 RX ORDER — ATORVASTATIN CALCIUM 40 MG/1
40 TABLET, FILM COATED ORAL NIGHTLY
Qty: 30 TABLET | Refills: 2 | Status: ON HOLD | OUTPATIENT
Start: 2019-05-18 | End: 2019-07-18

## 2019-05-18 RX ORDER — AMLODIPINE BESYLATE 10 MG/1
10 TABLET ORAL
Qty: 30 TABLET | Refills: 0 | Status: SHIPPED | OUTPATIENT
Start: 2019-05-19 | End: 2019-08-29 | Stop reason: HOSPADM

## 2019-05-18 RX ORDER — ASPIRIN 81 MG/1
81 TABLET, CHEWABLE ORAL DAILY
Qty: 30 TABLET | Refills: 0
Start: 2019-05-19 | End: 2019-06-18

## 2019-05-18 RX ORDER — LOSARTAN POTASSIUM AND HYDROCHLOROTHIAZIDE 25; 100 MG/1; MG/1
1 TABLET ORAL DAILY
Qty: 30 TABLET | Refills: 0 | Status: ON HOLD | OUTPATIENT
Start: 2019-05-18 | End: 2019-07-18

## 2019-05-18 RX ORDER — CLOPIDOGREL BISULFATE 75 MG/1
75 TABLET ORAL DAILY
Qty: 30 TABLET | Refills: 1 | Status: SHIPPED | OUTPATIENT
Start: 2019-05-19 | End: 2019-05-22 | Stop reason: HOSPADM

## 2019-05-18 RX ORDER — HYDRALAZINE HYDROCHLORIDE 50 MG/1
50 TABLET, FILM COATED ORAL EVERY 8 HOURS SCHEDULED
Qty: 90 TABLET | Refills: 0 | Status: SHIPPED | OUTPATIENT
Start: 2019-05-18 | End: 2019-08-29 | Stop reason: HOSPADM

## 2019-05-18 RX ADMIN — LOSARTAN POTASSIUM: 50 TABLET, FILM COATED ORAL at 08:56

## 2019-05-18 RX ADMIN — INSULIN LISPRO 2 UNITS: 100 INJECTION, SOLUTION INTRAVENOUS; SUBCUTANEOUS at 12:27

## 2019-05-18 RX ADMIN — CLOPIDOGREL 75 MG: 75 TABLET, FILM COATED ORAL at 08:56

## 2019-05-18 RX ADMIN — DORZOLAMIDE HYDROCHLORIDE 1 DROP: 20 SOLUTION/ DROPS OPHTHALMIC at 08:56

## 2019-05-18 RX ADMIN — HYDRALAZINE HYDROCHLORIDE 50 MG: 50 TABLET, FILM COATED ORAL at 06:33

## 2019-05-18 RX ADMIN — SODIUM CHLORIDE, PRESERVATIVE FREE 3 ML: 5 INJECTION INTRAVENOUS at 08:57

## 2019-05-18 RX ADMIN — AMLODIPINE BESYLATE 10 MG: 10 TABLET ORAL at 08:56

## 2019-05-18 RX ADMIN — ASPIRIN 81 MG: 81 TABLET, CHEWABLE ORAL at 08:56

## 2019-05-18 RX ADMIN — NEBIVOLOL HYDROCHLORIDE 20 MG: 10 TABLET ORAL at 08:56

## 2019-05-18 RX ADMIN — HYDRALAZINE HYDROCHLORIDE 50 MG: 50 TABLET, FILM COATED ORAL at 13:24

## 2019-05-18 RX ADMIN — SODIUM CHLORIDE 75 ML/HR: 9 INJECTION, SOLUTION INTRAVENOUS at 03:23

## 2019-05-18 RX ADMIN — BRIMONIDINE TARTRATE 1 DROP: 2 SOLUTION OPHTHALMIC at 08:56

## 2019-05-18 NOTE — PLAN OF CARE
Problem: Patient Care Overview  Goal: Plan of Care Review  Outcome: Ongoing (interventions implemented as appropriate)   05/18/19 1236   Coping/Psychosocial   Plan of Care Reviewed With patient   Plan of Care Review   Progress improving   OTHER   Outcome Summary Discharge home today.     Goal: Individualization and Mutuality  Outcome: Ongoing (interventions implemented as appropriate)

## 2019-05-18 NOTE — PROGRESS NOTES
"Patient Name: Landy Workman  :1945  74 y.o.      Patient Care Team:  Braulio Beyer MD as PCP - General (Nephrology)    Interval History:   Renal artery Doppler normal.    Subjective:  Following for stroke and hypertension    Objective   Vital Signs  Temp:  [98.2 °F (36.8 °C)-98.6 °F (37 °C)] 98.6 °F (37 °C)  Heart Rate:  [58-78] 78  Resp:  [14-18] 18  BP: (119-182)/(48-86) 139/61    Intake/Output Summary (Last 24 hours) at 2019 1111  Last data filed at 2019 0636  Gross per 24 hour   Intake 1806 ml   Output --   Net 1806 ml     Flowsheet Rows      First Filed Value   Admission Height  165.1 cm (65\") Documented at 2019 1635   Admission Weight  66.5 kg (146 lb 9.6 oz) Documented at 2019 1635          Physical Exam:   General Appearance:    Alert, cooperative, in no acute distress   Lungs:     Clear to auscultation.  Normal respiratory effort and rate.      Heart:    Regular rhythm and normal rate, normal S1 and S2, no murmurs, gallops or rubs.     Chest Wall:    No abnormalities observed   Abdomen:     Soft, nontender, positive bowel sounds.     Extremities:   no cyanosis, clubbing or edema.  No marked joint deformities.  Adequate musculoskeletal strength.       Results Review:    Results from last 7 days   Lab Units 05/15/19  0534   SODIUM mmol/L 142   POTASSIUM mmol/L 4.1   CHLORIDE mmol/L 107   CO2 mmol/L 26.8   BUN mg/dL 15   CREATININE mg/dL 0.69   GLUCOSE mg/dL 131*   CALCIUM mg/dL 8.9     Results from last 7 days   Lab Units 05/15/19  0534 19  1701   TROPONIN T ng/mL 0.322* 0.330*     Results from last 7 days   Lab Units 19  0352   WBC 10*3/mm3 6.50   HEMOGLOBIN g/dL 11.4*   HEMATOCRIT % 37.0   PLATELETS 10*3/mm3 199     Results from last 7 days   Lab Units 19  0352 19  0547 05/15/19  2345  05/15/19  0810   INR   --   --   --   --  0.99   APTT seconds 80.3* 75.3* 56.6*   < > 30.7    < > = values in this interval not displayed.     Results from " last 7 days   Lab Units 05/15/19  0534   CHOLESTEROL mg/dL 136         Results from last 7 days   Lab Units 05/15/19  0534   CHOLESTEROL mg/dL 136   TRIGLYCERIDES mg/dL 50   HDL CHOL mg/dL 52   LDL CHOL mg/dL 74         Medication Review:     amLODIPine 10 mg Oral Q24H   aspirin 81 mg Oral Daily   atorvastatin 40 mg Oral Nightly   brimonidine 1 drop Both Eyes BID   clopidogrel 75 mg Oral Daily   dorzolamide 1 drop Both Eyes BID   hydrALAZINE 50 mg Oral Q8H   insulin lispro 0-7 Units Subcutaneous 4x Daily With Meals & Nightly   losartan-HCTZ (HYZAAR) 100-25 combo dose  Oral Daily   nebivolol 20 mg Oral Daily   sodium chloride 3 mL Intravenous Q12H          niCARdipine 5-15 mg/hr Last Rate: Stopped (05/15/19 0040)   sodium chloride 75 mL/hr Last Rate: 75 mL/hr (05/18/19 0323)       Assessment/Plan     1.  Stroke.  Retinal artery occlusion.  I reviewed the images from a transesophageal echocardiogram.  She has age-related changes including mitral annular calcification and calcification of a trileaflet aortic valve.  She also has significant atheroma in the aortic arch and descending aorta.  I agree with discharge with a Zio patch.   2.  Hypertension.    Renal artery Dopplers normal.  Blood pressure is much improved.  I am okay with discharge.  3.  Hyperlipidemia    -I am okay with her going home today on her current medications.  -Follow-up with me at [place] in about a month  -Had a very pleasant conversation with her this morning about genealogy.  She has some fascinating stories about her mother getting out of [facility] when she was 6 months old and her father's family who lived through the [employer] revolution     Veronika Perez MD, [facility]  05/18/19  11:11 AM

## 2019-05-18 NOTE — PLAN OF CARE
Problem: Patient Care Overview  Goal: Plan of Care Review  Outcome: Ongoing (interventions implemented as appropriate)   05/18/19 0517   Coping/Psychosocial   Plan of Care Reviewed With patient   Plan of Care Review   Progress improving   OTHER   Outcome Summary Blood pressure much improved, possibly too low for neurology's comfort. Pt anxious to return home. IVF's cont. Pt taking adequate po fluids.     Goal: Individualization and Mutuality  Outcome: Ongoing (interventions implemented as appropriate)      Problem: Stroke (Ischemic) (Adult)  Goal: Signs and Symptoms of Listed Potential Problems Will be Absent, Minimized or Managed (Stroke)  Outcome: Ongoing (interventions implemented as appropriate)

## 2019-05-18 NOTE — DISCHARGE SUMMARY
[facility]    Date of Discharge:  5/18/2019    PCP: Braulio Beyer MD    Discharge Diagnosis:   Active Hospital Problems    Diagnosis  POA   • **Acute cerebrovascular accident (CVA) of cerebellum (CMS/[facility]) [I63.9]  Unknown   • Hypertensive crisis [I16.9]  Yes   • Diabetes mellitus (CMS/[facility]) [E11.9]  Unknown   • Hyperlipidemia [E78.5]  Unknown   • Hypertension [I10]  Unknown   • Elevated troponin [R74.8]  Unknown      Resolved Hospital Problems   No resolved problems to display.     Procedures Performed       Consults     Date and Time Order Name Status Description    5/14/2019 1959 Inpatient Neurology Consult Stroke Completed     5/14/2019 1959 Inpatient consult to Cardiology Completed     5/14/2019 1814 Inpatient Neurology Consult Stroke Completed     5/14/2019 1814 [facility] (on-call MD unless specified) Completed     5/14/2019 1814 [facility] (on-call MD unless specified) Completed         Hospital Course  Please see history and physical for details. Patient is a 74 y.o. female admitted with left eye vision changes.  She had left central retinal artery occlusion and acute stroke felt likely to be embolic.  She was seen by neurology and cardiology.  Her vision improved and is essentially normal today.  Neurology recommended aspirin and Plavix for a month and then aspirin alone, Lipitor 40 mg daily for 3 months and then decrease to 20 mg daily.  Her LDL was 74.  She will be discharged home with a ZIO patch.  She had an echocardiogram per cardiology.  She had age-related changes including mitral annular calcification and calcification of a trileaflet aortic valve.  She also has significant atheroma in the aortic arch and descending aorta.  She had problems with blood pressure and cardiology adjusted her medications and they are much improved today.  Renal artery Dopplers were normal.  Cardiology wants to see her in 1 month.  Neurology in 3 months.    I  discussed the patient's findings and my recommendations with patient and nursing staff.    Condition on Discharge: Improved.  Patient would like to go home today.    Temp:  [98.2 °F (36.8 °C)-98.6 °F (37 °C)] 98.6 °F (37 °C)  Heart Rate:  [58-78] 78  Resp:  [14-18] 18  BP: (119-179)/(48-71) 139/61  Body mass index is 23.86 kg/m².    Physical Exam   Constitutional: She is oriented to person, place, and time. No distress.   Cardiovascular: Normal rate and regular rhythm.   Pulmonary/Chest: Effort normal and breath sounds normal. No respiratory distress.   Abdominal: Soft. Bowel sounds are normal. There is no tenderness. There is no rebound and no guarding.   Musculoskeletal: She exhibits no edema.   Neurological: She is alert and oriented to person, place, and time.   Skin: Skin is warm and dry.   Psychiatric: She has a normal mood and affect. Her behavior is normal.        Discharge Medications      New Medications      Instructions Start Date   amLODIPine 10 MG tablet  Commonly known as:  NORVASC   10 mg, Oral, Every 24 Hours Scheduled   Start Date:  5/19/2019     aspirin 81 MG chewable tablet   81 mg, Oral, Daily, get over the counter and take for a month   Start Date:  5/19/2019     clopidogrel 75 MG tablet  Commonly known as:  PLAVIX   75 mg, Oral, Daily   Start Date:  5/19/2019     hydrALAZINE 50 MG tablet  Commonly known as:  APRESOLINE   50 mg, Oral, Every 8 Hours Scheduled      losartan-hydrochlorothiazide 100-25 MG per tablet  Commonly known as:  HYZAAR  Replaces:  losartan-hydrochlorothiazide 100-12.5 MG per tablet   1 tablet, Oral, Daily         Changes to Medications      Instructions Start Date   atorvastatin 40 MG tablet  Commonly known as:  LIPITOR  What changed:    · medication strength  · how much to take  · when to take this  · additional instructions   40 mg, Oral, Nightly, after three months resume 20 mg a day dosing         Continue These Medications      Instructions Start Date   brimonidine 0.2  % ophthalmic solution  Commonly known as:  ALPHAGAN   1 drop, Both Eyes, 2 Times Daily      dorzolamide 2 % ophthalmic solution  Commonly known as:  TRUSOPT   1 drop, Both Eyes, 2 Times Daily      glipiZIDE 10 MG tablet  Commonly known as:  GLUCOTROL   10 mg, Oral, 2 Times Daily Before Meals      metFORMIN 1000 MG tablet  Commonly known as:  GLUCOPHAGE   1,000 mg, Oral, 2 Times Daily With Meals      nebivolol 20 MG tablet  Commonly known as:  BYSTOLIC   20 mg, Oral, Daily      pioglitazone 45 MG tablet  Commonly known as:  ACTOS   45 mg, Oral, Daily         Stop These Medications    losartan-hydrochlorothiazide 100-12.5 MG per tablet  Commonly known as:  HYZAAR  Replaced by:  losartan-hydrochlorothiazide 100-25 MG per tablet           Diet Instructions     Diet: Regular, Consistent Carbohydrate      Discharge Diet:   Regular  Consistent Carbohydrate            Activity Instructions     Activity as Tolerated           Additional Instructions for the Follow-ups that You Need to Schedule     Call MD for problems / concerns.   As directed        Follow-up Information     Braulio Beyer MD Follow up in 1 week(s).    Specialty:  Nephrology  Contact information:  [address]  [facility] [id number] [phone]             Veronika Perez MD Follow up in 1 month(s).    Specialty:  Cardiology  Why:  [facility]  Contact information:  [address]  [address]  [facility] [id number] [phone]             Maine Starr APRN Follow up in 3 month(s).    Specialty:  Neurology  Contact information:  [address]  [address]  [place] [id number] [phone]                  Gilberto Maria MD  05/18/19  12:51 PM    Discharge time spent greater than 30 minutes.

## 2019-05-19 ENCOUNTER — READMISSION MANAGEMENT (OUTPATIENT)
Dept: CALL CENTER | Facility: HOSPITAL | Age: 74
End: 2019-05-19

## 2019-05-20 ENCOUNTER — READMISSION MANAGEMENT (OUTPATIENT)
Dept: CALL CENTER | Facility: HOSPITAL | Age: 74
End: 2019-05-20

## 2019-05-20 NOTE — OUTREACH NOTE
Stroke Week 1 Survey      Responses   Facility patient discharged from?  [facility]   Does the patient have one of the following disease processes/diagnoses(primary or secondary)?  Stroke (TIA)   Is there a successful TCM telephone encounter documented?  No   Week 1 attempt successful?  No   Unsuccessful attempts  Attempt 1          Carla Cam, RN

## 2019-05-21 ENCOUNTER — APPOINTMENT (OUTPATIENT)
Dept: CT IMAGING | Facility: HOSPITAL | Age: 74
End: 2019-05-21

## 2019-05-21 ENCOUNTER — APPOINTMENT (OUTPATIENT)
Dept: MRI IMAGING | Facility: HOSPITAL | Age: 74
End: 2019-05-21

## 2019-05-21 ENCOUNTER — HOSPITAL ENCOUNTER (OUTPATIENT)
Facility: HOSPITAL | Age: 74
Setting detail: OBSERVATION
Discharge: HOME | End: 2019-05-22
Attending: EMERGENCY MEDICINE | Admitting: HOSPITALIST

## 2019-05-21 DIAGNOSIS — Z86.73 STROKE, RECENT, WITHOUT LATE EFFECT: ICD-10-CM

## 2019-05-21 DIAGNOSIS — R93.0 ABNORMAL CT OF THE HEAD: ICD-10-CM

## 2019-05-21 DIAGNOSIS — R51.9 RIGHT-SIDED HEADACHE: Primary | ICD-10-CM

## 2019-05-21 PROBLEM — I63.9 ACUTE ISCHEMIC STROKE (HCC): Status: ACTIVE | Noted: 2019-05-21

## 2019-05-21 LAB
ALBUMIN SERPL-MCNC: 4 G/DL (ref 3.5–5.2)
ALBUMIN/GLOB SERPL: 1.3 G/DL
ALP SERPL-CCNC: 97 U/L (ref 39–117)
ALT SERPL W P-5'-P-CCNC: 15 U/L (ref 1–33)
ANION GAP SERPL CALCULATED.3IONS-SCNC: 13.9 MMOL/L
AST SERPL-CCNC: 19 U/L (ref 1–32)
BACTERIA UR QL AUTO: ABNORMAL /HPF
BASOPHILS # BLD AUTO: 0.03 10*3/MM3 (ref 0–0.2)
BASOPHILS NFR BLD AUTO: 0.4 % (ref 0–1.5)
BILIRUB SERPL-MCNC: 0.3 MG/DL (ref 0.2–1.2)
BILIRUB UR QL STRIP: NEGATIVE
BUN BLD-MCNC: 27 MG/DL (ref 8–23)
BUN/CREAT SERPL: 31.4 (ref 7–25)
CALCIUM SPEC-SCNC: 10 MG/DL (ref 8.6–10.5)
CHLORIDE SERPL-SCNC: 99 MMOL/L (ref 98–107)
CLARITY UR: CLEAR
CO2 SERPL-SCNC: 24.1 MMOL/L (ref 22–29)
COLOR UR: YELLOW
CREAT BLD-MCNC: 0.86 MG/DL (ref 0.57–1)
DEPRECATED RDW RBC AUTO: 48.4 FL (ref 37–54)
EOSINOPHIL # BLD AUTO: 0 10*3/MM3 (ref 0–0.4)
EOSINOPHIL NFR BLD AUTO: 0 % (ref 0.3–6.2)
ERYTHROCYTE [DISTWIDTH] IN BLOOD BY AUTOMATED COUNT: 14.9 % (ref 12.3–15.4)
GFR SERPL CREATININE-BSD FRML MDRD: 64 ML/MIN/1.73
GLOBULIN UR ELPH-MCNC: 3 GM/DL
GLUCOSE BLD-MCNC: 155 MG/DL (ref 65–99)
GLUCOSE BLDC GLUCOMTR-MCNC: 147 MG/DL (ref 70–130)
GLUCOSE UR STRIP-MCNC: NEGATIVE MG/DL
HCT VFR BLD AUTO: 36 % (ref 34–46.6)
HGB BLD-MCNC: 11.5 G/DL (ref 12–15.9)
HGB UR QL STRIP.AUTO: NEGATIVE
HYALINE CASTS UR QL AUTO: ABNORMAL /LPF
IMM GRANULOCYTES # BLD AUTO: 0.02 10*3/MM3 (ref 0–0.05)
IMM GRANULOCYTES NFR BLD AUTO: 0.3 % (ref 0–0.5)
KETONES UR QL STRIP: ABNORMAL
LEUKOCYTE ESTERASE UR QL STRIP.AUTO: ABNORMAL
LYMPHOCYTES # BLD AUTO: 0.66 10*3/MM3 (ref 0.7–3.1)
LYMPHOCYTES NFR BLD AUTO: 9.6 % (ref 19.6–45.3)
MCH RBC QN AUTO: 28.4 PG (ref 26.6–33)
MCHC RBC AUTO-ENTMCNC: 31.9 G/DL (ref 31.5–35.7)
MCV RBC AUTO: 88.9 FL (ref 79–97)
MONOCYTES # BLD AUTO: 0.26 10*3/MM3 (ref 0.1–0.9)
MONOCYTES NFR BLD AUTO: 3.8 % (ref 5–12)
NEUTROPHILS # BLD AUTO: 5.87 10*3/MM3 (ref 1.7–7)
NEUTROPHILS NFR BLD AUTO: 85.9 % (ref 42.7–76)
NITRITE UR QL STRIP: NEGATIVE
NRBC BLD AUTO-RTO: 0 /100 WBC (ref 0–0.2)
PH UR STRIP.AUTO: <=5 [PH] (ref 5–8)
PLATELET # BLD AUTO: 248 10*3/MM3 (ref 140–450)
PMV BLD AUTO: 10 FL (ref 6–12)
POTASSIUM BLD-SCNC: 3.6 MMOL/L (ref 3.5–5.2)
PROT SERPL-MCNC: 7 G/DL (ref 6–8.5)
PROT UR QL STRIP: NEGATIVE
RBC # BLD AUTO: 4.05 10*6/MM3 (ref 3.77–5.28)
RBC # UR: ABNORMAL /HPF
REF LAB TEST METHOD: ABNORMAL
SODIUM BLD-SCNC: 137 MMOL/L (ref 136–145)
SP GR UR STRIP: 1.02 (ref 1–1.03)
SQUAMOUS #/AREA URNS HPF: ABNORMAL /HPF
UROBILINOGEN UR QL STRIP: ABNORMAL
WBC NRBC COR # BLD: 6.84 10*3/MM3 (ref 3.4–10.8)
WBC UR QL AUTO: ABNORMAL /HPF

## 2019-05-21 PROCEDURE — 82962 GLUCOSE BLOOD TEST: CPT

## 2019-05-21 PROCEDURE — 36415 COLL VENOUS BLD VENIPUNCTURE: CPT

## 2019-05-21 PROCEDURE — 99285 EMERGENCY DEPT VISIT HI MDM: CPT

## 2019-05-21 PROCEDURE — 80053 COMPREHEN METABOLIC PANEL: CPT | Performed by: NURSE PRACTITIONER

## 2019-05-21 PROCEDURE — G0378 HOSPITAL OBSERVATION PER HR: HCPCS

## 2019-05-21 PROCEDURE — 81001 URINALYSIS AUTO W/SCOPE: CPT | Performed by: NURSE PRACTITIONER

## 2019-05-21 PROCEDURE — 85025 COMPLETE CBC W/AUTO DIFF WBC: CPT | Performed by: NURSE PRACTITIONER

## 2019-05-21 PROCEDURE — 70450 CT HEAD/BRAIN W/O DYE: CPT

## 2019-05-21 PROCEDURE — 70551 MRI BRAIN STEM W/O DYE: CPT

## 2019-05-21 RX ORDER — ONDANSETRON 2 MG/ML
4 INJECTION INTRAMUSCULAR; INTRAVENOUS EVERY 6 HOURS PRN
Status: DISCONTINUED | OUTPATIENT
Start: 2019-05-21 | End: 2019-05-22 | Stop reason: HOSPADM

## 2019-05-21 RX ORDER — ASPIRIN 325 MG
325 TABLET ORAL DAILY
Status: DISCONTINUED | OUTPATIENT
Start: 2019-05-22 | End: 2019-05-22

## 2019-05-21 RX ORDER — ASPIRIN 300 MG/1
300 SUPPOSITORY RECTAL DAILY
Status: DISCONTINUED | OUTPATIENT
Start: 2019-05-22 | End: 2019-05-22

## 2019-05-21 RX ORDER — SODIUM CHLORIDE 0.9 % (FLUSH) 0.9 %
3 SYRINGE (ML) INJECTION EVERY 12 HOURS SCHEDULED
Status: DISCONTINUED | OUTPATIENT
Start: 2019-05-22 | End: 2019-05-22 | Stop reason: HOSPADM

## 2019-05-21 RX ORDER — ATORVASTATIN CALCIUM 80 MG/1
80 TABLET, FILM COATED ORAL NIGHTLY
Status: DISCONTINUED | OUTPATIENT
Start: 2019-05-22 | End: 2019-05-22 | Stop reason: HOSPADM

## 2019-05-21 RX ORDER — SODIUM CHLORIDE 0.9 % (FLUSH) 0.9 %
1-10 SYRINGE (ML) INJECTION AS NEEDED
Status: DISCONTINUED | OUTPATIENT
Start: 2019-05-21 | End: 2019-05-22 | Stop reason: HOSPADM

## 2019-05-21 RX ORDER — ACETAMINOPHEN 500 MG
1000 TABLET ORAL ONCE
Status: COMPLETED | OUTPATIENT
Start: 2019-05-21 | End: 2019-05-21

## 2019-05-21 RX ORDER — ASPIRIN 81 MG/1
324 TABLET, CHEWABLE ORAL ONCE
Status: COMPLETED | OUTPATIENT
Start: 2019-05-21 | End: 2019-05-21

## 2019-05-21 RX ADMIN — ASPIRIN 324 MG: 81 TABLET, CHEWABLE ORAL at 19:39

## 2019-05-21 RX ADMIN — ACETAMINOPHEN 1000 MG: 500 TABLET, FILM COATED ORAL at 17:55

## 2019-05-22 ENCOUNTER — READMISSION MANAGEMENT (OUTPATIENT)
Dept: CALL CENTER | Facility: HOSPITAL | Age: 74
End: 2019-05-22

## 2019-05-22 VITALS
BODY MASS INDEX: 23.82 KG/M2 | TEMPERATURE: 98.1 F | DIASTOLIC BLOOD PRESSURE: 68 MMHG | HEART RATE: 62 BPM | RESPIRATION RATE: 16 BRPM | SYSTOLIC BLOOD PRESSURE: 130 MMHG | OXYGEN SATURATION: 94 % | WEIGHT: 143 LBS | HEIGHT: 65 IN

## 2019-05-22 PROBLEM — I63.9 CVA (CEREBRAL VASCULAR ACCIDENT) (HCC): Status: ACTIVE | Noted: 2019-05-22

## 2019-05-22 LAB
ALBUMIN SERPL-MCNC: 3.6 G/DL (ref 3.5–5.2)
ALBUMIN/GLOB SERPL: 1.6 G/DL
ALP SERPL-CCNC: 82 U/L (ref 39–117)
ALT SERPL W P-5'-P-CCNC: 13 U/L (ref 1–33)
ANION GAP SERPL CALCULATED.3IONS-SCNC: 10.2 MMOL/L
AST SERPL-CCNC: 14 U/L (ref 1–32)
BILIRUB SERPL-MCNC: 0.3 MG/DL (ref 0.2–1.2)
BUN BLD-MCNC: 26 MG/DL (ref 8–23)
BUN/CREAT SERPL: 31 (ref 7–25)
CALCIUM SPEC-SCNC: 8.7 MG/DL (ref 8.6–10.5)
CHLORIDE SERPL-SCNC: 102 MMOL/L (ref 98–107)
CHOLEST SERPL-MCNC: 120 MG/DL (ref 0–200)
CO2 SERPL-SCNC: 25.8 MMOL/L (ref 22–29)
CREAT BLD-MCNC: 0.84 MG/DL (ref 0.57–1)
CRP SERPL-MCNC: 0.08 MG/DL (ref 0–0.5)
DEPRECATED RDW RBC AUTO: 49.5 FL (ref 37–54)
ERYTHROCYTE [DISTWIDTH] IN BLOOD BY AUTOMATED COUNT: 15.1 % (ref 12.3–15.4)
ERYTHROCYTE [SEDIMENTATION RATE] IN BLOOD: 30 MM/HR (ref 0–30)
FOLATE SERPL-MCNC: 16.4 NG/ML (ref 4.78–24.2)
GFR SERPL CREATININE-BSD FRML MDRD: 66 ML/MIN/1.73
GLOBULIN UR ELPH-MCNC: 2.2 GM/DL
GLUCOSE BLD-MCNC: 146 MG/DL (ref 65–99)
GLUCOSE BLDC GLUCOMTR-MCNC: 117 MG/DL (ref 70–130)
GLUCOSE BLDC GLUCOMTR-MCNC: 141 MG/DL (ref 70–130)
GLUCOSE BLDC GLUCOMTR-MCNC: 181 MG/DL (ref 70–130)
GLUCOSE BLDC GLUCOMTR-MCNC: 90 MG/DL (ref 70–130)
HBA1C MFR BLD: 6.1 % (ref 4.8–5.6)
HCT VFR BLD AUTO: 35.1 % (ref 34–46.6)
HCYS SERPL-MCNC: 10.1 UMOL/L (ref 0–15)
HDLC SERPL-MCNC: 43 MG/DL (ref 40–60)
HGB BLD-MCNC: 10.8 G/DL (ref 12–15.9)
LDLC SERPL CALC-MCNC: 61 MG/DL (ref 0–100)
LDLC/HDLC SERPL: 1.42 {RATIO}
MCH RBC QN AUTO: 27.8 PG (ref 26.6–33)
MCHC RBC AUTO-ENTMCNC: 30.8 G/DL (ref 31.5–35.7)
MCV RBC AUTO: 90.5 FL (ref 79–97)
PLATELET # BLD AUTO: 212 10*3/MM3 (ref 140–450)
PMV BLD AUTO: 10.1 FL (ref 6–12)
POTASSIUM BLD-SCNC: 3.5 MMOL/L (ref 3.5–5.2)
PROT SERPL-MCNC: 5.8 G/DL (ref 6–8.5)
RBC # BLD AUTO: 3.88 10*6/MM3 (ref 3.77–5.28)
RPR SER QL: NORMAL
SODIUM BLD-SCNC: 138 MMOL/L (ref 136–145)
T4 FREE SERPL-MCNC: 1.61 NG/DL (ref 0.93–1.7)
TRIGL SERPL-MCNC: 79 MG/DL (ref 0–150)
TSH SERPL DL<=0.05 MIU/L-ACNC: 0.84 MIU/ML (ref 0.27–4.2)
VIT B12 BLD-MCNC: 350 PG/ML (ref 211–946)
VLDLC SERPL-MCNC: 15.8 MG/DL (ref 5–40)
WBC NRBC COR # BLD: 6.48 10*3/MM3 (ref 3.4–10.8)

## 2019-05-22 PROCEDURE — 83036 HEMOGLOBIN GLYCOSYLATED A1C: CPT | Performed by: NURSE PRACTITIONER

## 2019-05-22 PROCEDURE — 80061 LIPID PANEL: CPT | Performed by: NURSE PRACTITIONER

## 2019-05-22 PROCEDURE — 86140 C-REACTIVE PROTEIN: CPT | Performed by: PSYCHIATRY & NEUROLOGY

## 2019-05-22 PROCEDURE — 86592 SYPHILIS TEST NON-TREP QUAL: CPT | Performed by: PSYCHIATRY & NEUROLOGY

## 2019-05-22 PROCEDURE — 80053 COMPREHEN METABOLIC PANEL: CPT | Performed by: NURSE PRACTITIONER

## 2019-05-22 PROCEDURE — 82962 GLUCOSE BLOOD TEST: CPT

## 2019-05-22 PROCEDURE — 84443 ASSAY THYROID STIM HORMONE: CPT | Performed by: PSYCHIATRY & NEUROLOGY

## 2019-05-22 PROCEDURE — 84439 ASSAY OF FREE THYROXINE: CPT | Performed by: PSYCHIATRY & NEUROLOGY

## 2019-05-22 PROCEDURE — 85027 COMPLETE CBC AUTOMATED: CPT | Performed by: NURSE PRACTITIONER

## 2019-05-22 PROCEDURE — G0378 HOSPITAL OBSERVATION PER HR: HCPCS

## 2019-05-22 PROCEDURE — 97161 PT EVAL LOW COMPLEX 20 MIN: CPT

## 2019-05-22 PROCEDURE — 99222 1ST HOSP IP/OBS MODERATE 55: CPT | Performed by: NURSE PRACTITIONER

## 2019-05-22 PROCEDURE — 97165 OT EVAL LOW COMPLEX 30 MIN: CPT | Performed by: OCCUPATIONAL THERAPIST

## 2019-05-22 PROCEDURE — 96360 HYDRATION IV INFUSION INIT: CPT

## 2019-05-22 PROCEDURE — 85652 RBC SED RATE AUTOMATED: CPT | Performed by: PSYCHIATRY & NEUROLOGY

## 2019-05-22 PROCEDURE — 82746 ASSAY OF FOLIC ACID SERUM: CPT | Performed by: PSYCHIATRY & NEUROLOGY

## 2019-05-22 PROCEDURE — 63710000001 INSULIN LISPRO (HUMAN) PER 5 UNITS: Performed by: INTERNAL MEDICINE

## 2019-05-22 PROCEDURE — 82607 VITAMIN B-12: CPT | Performed by: PSYCHIATRY & NEUROLOGY

## 2019-05-22 PROCEDURE — 83090 ASSAY OF HOMOCYSTEINE: CPT | Performed by: PSYCHIATRY & NEUROLOGY

## 2019-05-22 PROCEDURE — 99221 1ST HOSP IP/OBS SF/LOW 40: CPT | Performed by: PSYCHIATRY & NEUROLOGY

## 2019-05-22 PROCEDURE — 99222 1ST HOSP IP/OBS MODERATE 55: CPT | Performed by: INTERNAL MEDICINE

## 2019-05-22 PROCEDURE — 92610 EVALUATE SWALLOWING FUNCTION: CPT

## 2019-05-22 PROCEDURE — 96361 HYDRATE IV INFUSION ADD-ON: CPT

## 2019-05-22 RX ORDER — DORZOLAMIDE HCL 20 MG/ML
1 SOLUTION/ DROPS OPHTHALMIC 2 TIMES DAILY
Status: DISCONTINUED | OUTPATIENT
Start: 2019-05-22 | End: 2019-05-22 | Stop reason: HOSPADM

## 2019-05-22 RX ORDER — BRIMONIDINE TARTRATE 2 MG/ML
1 SOLUTION/ DROPS OPHTHALMIC 2 TIMES DAILY
Status: DISCONTINUED | OUTPATIENT
Start: 2019-05-22 | End: 2019-05-22 | Stop reason: HOSPADM

## 2019-05-22 RX ORDER — NEBIVOLOL 10 MG/1
20 TABLET ORAL DAILY
Status: DISCONTINUED | OUTPATIENT
Start: 2019-05-22 | End: 2019-05-22 | Stop reason: HOSPADM

## 2019-05-22 RX ORDER — NICOTINE POLACRILEX 4 MG
15 LOZENGE BUCCAL
Status: DISCONTINUED | OUTPATIENT
Start: 2019-05-22 | End: 2019-05-22 | Stop reason: HOSPADM

## 2019-05-22 RX ORDER — ASPIRIN 81 MG/1
81 TABLET ORAL DAILY
Status: DISCONTINUED | OUTPATIENT
Start: 2019-05-22 | End: 2019-05-22 | Stop reason: HOSPADM

## 2019-05-22 RX ORDER — ACETAMINOPHEN 325 MG/1
650 TABLET ORAL EVERY 6 HOURS PRN
Status: DISCONTINUED | OUTPATIENT
Start: 2019-05-22 | End: 2019-05-22 | Stop reason: HOSPADM

## 2019-05-22 RX ORDER — DEXTROSE MONOHYDRATE 25 G/50ML
25 INJECTION, SOLUTION INTRAVENOUS
Status: DISCONTINUED | OUTPATIENT
Start: 2019-05-22 | End: 2019-05-22 | Stop reason: HOSPADM

## 2019-05-22 RX ORDER — SODIUM CHLORIDE 9 MG/ML
100 INJECTION, SOLUTION INTRAVENOUS CONTINUOUS
Status: DISCONTINUED | OUTPATIENT
Start: 2019-05-22 | End: 2019-05-22 | Stop reason: HOSPADM

## 2019-05-22 RX ADMIN — APIXABAN 5 MG: 5 TABLET, FILM COATED ORAL at 09:38

## 2019-05-22 RX ADMIN — INSULIN LISPRO 2 UNITS: 100 INJECTION, SOLUTION INTRAVENOUS; SUBCUTANEOUS at 12:10

## 2019-05-22 RX ADMIN — ASPIRIN 81 MG: 81 TABLET, DELAYED RELEASE ORAL at 09:38

## 2019-05-22 RX ADMIN — ACETAMINOPHEN 650 MG: 325 TABLET, FILM COATED ORAL at 14:41

## 2019-05-22 RX ADMIN — SODIUM CHLORIDE, PRESERVATIVE FREE 3 ML: 5 INJECTION INTRAVENOUS at 01:18

## 2019-05-22 RX ADMIN — SODIUM CHLORIDE, PRESERVATIVE FREE 3 ML: 5 INJECTION INTRAVENOUS at 09:38

## 2019-05-22 RX ADMIN — NEBIVOLOL HYDROCHLORIDE 20 MG: 10 TABLET ORAL at 09:38

## 2019-05-22 RX ADMIN — APIXABAN 5 MG: 5 TABLET, FILM COATED ORAL at 03:03

## 2019-05-22 RX ADMIN — BRIMONIDINE TARTRATE 1 DROP: 2 SOLUTION OPHTHALMIC at 09:38

## 2019-05-22 RX ADMIN — SODIUM CHLORIDE 100 ML/HR: 9 INJECTION, SOLUTION INTRAVENOUS at 01:18

## 2019-05-22 RX ADMIN — DORZOLAMIDE HYDROCHLORIDE 1 DROP: 20 SOLUTION/ DROPS OPHTHALMIC at 09:38

## 2019-05-22 NOTE — OUTREACH NOTE
Stroke Week 1 Survey      Responses   Facility patient discharged from?  [facility]   Does the patient have one of the following disease processes/diagnoses(primary or secondary)?  Stroke (TIA)   Is there a successful TCM telephone encounter documented?  No   Week 1 attempt successful?  Yes   Call start time  0956   Revoke  Readmitted   Call end time  0956   Discharge diagnosis  Acute cerebrovascular accident           You Larkin RN

## 2019-05-23 ENCOUNTER — READMISSION MANAGEMENT (OUTPATIENT)
Dept: CALL CENTER | Facility: HOSPITAL | Age: 74
End: 2019-05-23

## 2019-05-23 ENCOUNTER — TELEPHONE (OUTPATIENT)
Dept: NEUROSURGERY | Facility: CLINIC | Age: 74
End: 2019-05-23

## 2019-05-23 DIAGNOSIS — I65.22 STENOSIS OF LEFT CAROTID ARTERY: Primary | ICD-10-CM

## 2019-05-23 NOTE — OUTREACH NOTE
Prep Survey      Responses   Facility patient discharged from?  [facility]   Is patient eligible?  Yes   Discharge diagnosis  Acute ischemic stroke    Does the patient have one of the following disease processes/diagnoses(primary or secondary)?  Stroke (TIA)   Does the patient have Home health ordered?  No   Is there a DME ordered?  No   Medication alerts for this patient  Eliquis    Prep survey completed?  Yes          Laura Huizar RN

## 2019-05-23 NOTE — TELEPHONE ENCOUNTER
Appt with ANNITA on 6/28 @ 9:15am.  Carotid doppler @ [facility] on 6/17 @ 9:45am.  Letter/forms mailed to pt.

## 2019-05-23 NOTE — TELEPHONE ENCOUNTER
----- Message from GETACHEW Byrnes sent at 5/23/2019  9:02 AM EDT -----  Please arrange outpatient follow-up in 4 to 6 weeks with Dr. Oliva, will need bilateral carotid ultrasound for history of severe left sided stenosis.  Thanks

## 2019-05-24 ENCOUNTER — READMISSION MANAGEMENT (OUTPATIENT)
Dept: CALL CENTER | Facility: HOSPITAL | Age: 74
End: 2019-05-24

## 2019-05-24 NOTE — OUTREACH NOTE
Stroke Week 1 Survey      Responses   Facility patient discharged from?  [facility]   Does the patient have one of the following disease processes/diagnoses(primary or secondary)?  Stroke (TIA)   Is there a successful TCM telephone encounter documented?  No   Week 1 attempt successful?  No   Unsuccessful attempts  Attempt 1          Rajat Ramirez RN

## 2019-05-28 ENCOUNTER — READMISSION MANAGEMENT (OUTPATIENT)
Dept: CALL CENTER | Facility: HOSPITAL | Age: 74
End: 2019-05-28

## 2019-05-28 NOTE — OUTREACH NOTE
Stroke Week 1 Survey      Responses   Facility patient discharged from?  [facility]   Does the patient have one of the following disease processes/diagnoses(primary or secondary)?  Stroke (TIA)   Is there a successful TCM telephone encounter documented?  No   Week 1 attempt successful?  No   Unsuccessful attempts  Attempt 2          You Larkin RN

## 2019-05-29 PROCEDURE — 0298T HOLTER MONITOR - 72 HOUR UP TO 21 DAY: CPT | Performed by: INTERNAL MEDICINE

## 2019-05-30 ENCOUNTER — READMISSION MANAGEMENT (OUTPATIENT)
Dept: CALL CENTER | Facility: HOSPITAL | Age: 74
End: 2019-05-30

## 2019-05-30 NOTE — OUTREACH NOTE
Stroke Week 2 Survey      Responses   Facility patient discharged from?  [facility]   Does the patient have one of the following disease processes/diagnoses(primary or secondary)?  Stroke (TIA)   Week 2 attempt successful?  No   Unsuccessful attempts  Attempt 1          Carla Cam RN

## 2019-06-03 ENCOUNTER — READMISSION MANAGEMENT (OUTPATIENT)
Dept: CALL CENTER | Facility: HOSPITAL | Age: 74
End: 2019-06-03

## 2019-06-03 NOTE — OUTREACH NOTE
Stroke Week 2 Survey      Responses   Facility patient discharged from?  [facility]   Does the patient have one of the following disease processes/diagnoses(primary or secondary)?  Stroke (TIA)   Week 2 attempt successful?  No   Unsuccessful attempts  Attempt 2          Virginia Montelongo RN

## 2019-06-04 ENCOUNTER — HOSPITAL ENCOUNTER (EMERGENCY)
Facility: HOSPITAL | Age: 74
Discharge: HOME | End: 2019-06-04
Attending: EMERGENCY MEDICINE | Admitting: EMERGENCY MEDICINE

## 2019-06-04 ENCOUNTER — APPOINTMENT (OUTPATIENT)
Dept: GENERAL RADIOLOGY | Facility: HOSPITAL | Age: 74
End: 2019-06-04

## 2019-06-04 ENCOUNTER — TELEPHONE (OUTPATIENT)
Dept: CARDIOLOGY | Facility: CLINIC | Age: 74
End: 2019-06-04

## 2019-06-04 VITALS
HEIGHT: 65 IN | TEMPERATURE: 97.7 F | OXYGEN SATURATION: 96 % | DIASTOLIC BLOOD PRESSURE: 65 MMHG | SYSTOLIC BLOOD PRESSURE: 132 MMHG | HEART RATE: 59 BPM | RESPIRATION RATE: 18 BRPM | WEIGHT: 132 LBS | BODY MASS INDEX: 21.99 KG/M2

## 2019-06-04 DIAGNOSIS — M79.18 MUSCULOSKELETAL PAIN: Primary | ICD-10-CM

## 2019-06-04 LAB
HOLD SPECIMEN: NORMAL
WHOLE BLOOD HOLD SPECIMEN: NORMAL

## 2019-06-04 PROCEDURE — 99284 EMERGENCY DEPT VISIT MOD MDM: CPT

## 2019-06-04 PROCEDURE — 93005 ELECTROCARDIOGRAM TRACING: CPT | Performed by: EMERGENCY MEDICINE

## 2019-06-04 PROCEDURE — 93005 ELECTROCARDIOGRAM TRACING: CPT

## 2019-06-04 PROCEDURE — 73030 X-RAY EXAM OF SHOULDER: CPT

## 2019-06-04 PROCEDURE — 93010 ELECTROCARDIOGRAM REPORT: CPT | Performed by: INTERNAL MEDICINE

## 2019-06-04 RX ORDER — HYDROCODONE BITARTRATE AND ACETAMINOPHEN 7.5; 325 MG/1; MG/1
1 TABLET ORAL ONCE
Status: COMPLETED | OUTPATIENT
Start: 2019-06-04 | End: 2019-06-04

## 2019-06-04 RX ORDER — CYCLOBENZAPRINE HCL 10 MG
10 TABLET ORAL ONCE
Status: COMPLETED | OUTPATIENT
Start: 2019-06-04 | End: 2019-06-04

## 2019-06-04 RX ORDER — HYDROCODONE BITARTRATE AND ACETAMINOPHEN 5; 325 MG/1; MG/1
1 TABLET ORAL EVERY 4 HOURS PRN
Qty: 12 TABLET | Refills: 0 | Status: ON HOLD | OUTPATIENT
Start: 2019-06-04 | End: 2019-07-18

## 2019-06-04 RX ORDER — CYCLOBENZAPRINE HCL 10 MG
10 TABLET ORAL 3 TIMES DAILY PRN
Qty: 15 TABLET | Refills: 0 | Status: ON HOLD | OUTPATIENT
Start: 2019-06-04 | End: 2019-07-18

## 2019-06-04 RX ADMIN — HYDROCODONE BITARTRATE AND ACETAMINOPHEN 1 TABLET: 7.5; 325 TABLET ORAL at 19:32

## 2019-06-04 RX ADMIN — CYCLOBENZAPRINE 10 MG: 10 TABLET, FILM COATED ORAL at 19:31

## 2019-06-04 NOTE — TELEPHONE ENCOUNTER
FYI daughter calling in reporting her mother is in a lot of distress and is having a hard time catching her breath.  She is c/o right shoulder pain that is taking her breath away.  Daughter is going to take her back to the ER for further evaluation.  Laura Paul RN  Triage nurse

## 2019-06-04 NOTE — ED PROVIDER NOTES
EMERGENCY DEPARTMENT ENCOUNTER    CHIEF COMPLAINT  Chief Complaint:L shoulder pain  History given by:Pt  History limited by:none  Time Seen: 1828  Room Number: 33/33  PMD: Eric Matta MD    HPI:  Pt is a 74 y.o. female who presents with intermittent posterior left shoulder pain that began after waking up this morning. Pt states the pain occurs when she stands up and walks and resolves with rest. Pt denies taking anything for the pain. Pt denies any known injury or trauma to her shoulder. Patient also complains of SOA with pain. Patient denies numbness, tingling, or weakness to left arm or CP.     Past Medical History of DM and CVA.    Duration: since this morning  Timing:intermittent  Location:left   Radiation:none  Quality:pain  Intensity/Severity:moderate  Progression:unchanged  Associated Symptoms:SOA with pain  Aggravating Factors: movement  Alleviating Factors:rest  Previous Episodes:none  Treatment before arrival:none    PAST MEDICAL HISTORY  Active Ambulatory Problems     Diagnosis Date Noted   • Hypertensive crisis 05/14/2019   • Diabetes mellitus (CMS/H[facility]) 05/14/2019   • Hyperlipidemia 05/14/2019   • Hypertension 05/14/2019   • Elevated troponin 05/14/2019   • Acute cerebrovascular accident (CVA) of cerebellum (CMS/H[facility]) 05/15/2019   • Right-sided headache 05/21/2019   • Acute ischemic stroke (CMS/[facility]) 05/21/2019   • CVA (cerebral vascular accident) (CMS/[facility]) 05/22/2019     Resolved Ambulatory Problems     Diagnosis Date Noted   • No Resolved Ambulatory Problems     Past Medical History:   Diagnosis Date   • Diabetes mellitus (CMS/[facility])    • Hyperlipidemia    • Hypertension        PAST SURGICAL HISTORY  No past surgical history on file.    FAMILY HISTORY  Family History   Problem Relation Age of Onset   • Breast cancer Neg Hx        SOCIAL HISTORY  Social History     Socioeconomic History   • Marital status:      Spouse name: Not on file   • Number of children: Not on file   • Years of education:  Not on file   • Highest education level: Not on file   Tobacco Use   • Smoking status: Never Smoker   • Smokeless tobacco: Never Used   Substance and Sexual Activity   • Alcohol use: No     Frequency: Never   • Drug use: No         ALLERGIES  Patient has no known allergies.    REVIEW OF SYSTEMS  Review of Systems   Constitutional: Negative for chills and fever.   HENT: Negative for sore throat.    Respiratory: Positive for shortness of breath (with pain).    Cardiovascular: Negative for chest pain.   Gastrointestinal: Negative for nausea and vomiting.   Genitourinary: Negative for dysuria.   Musculoskeletal: Positive for arthralgias (intermittent posterior L shoulder pain). Negative for back pain.   Skin: Negative for rash.   Neurological: Negative for dizziness.   Psychiatric/Behavioral: The patient is not nervous/anxious.        PHYSICAL EXAM  ED Triage Vitals   Temp Heart Rate Resp BP SpO2   06/04/19 1716 06/04/19 1716 06/04/19 1716 06/04/19 1800 06/04/19 1716   97.7 °F (36.5 °C) 94 18 133/65 96 %       Physical Exam   Constitutional: She is well-developed, well-nourished, and in no distress. No distress.   HENT:   Head: Normocephalic.   Mouth/Throat: Mucous membranes are normal.   Eyes: No scleral icterus.   Neck: Normal range of motion.   Cardiovascular: Normal rate, regular rhythm and normal heart sounds.   Pulses:       Radial pulses are 2+ on the right side, and 2+ on the left side.   Pulmonary/Chest: Effort normal and breath sounds normal. No respiratory distress.   Musculoskeletal: Normal range of motion.        Left shoulder: She exhibits tenderness (posteriorly). She exhibits normal range of motion, no swelling, no effusion, no crepitus and no deformity.   Reproducible posterior shoulder tenderness.   Neurological: She is alert.   Normal strength and sensation to LUE   Skin: Skin is warm and dry.   Psychiatric: Mood and affect normal.   Nursing note and vitals reviewed.      LAB RESULTS  Recent Results  (from the past 24 hour(s))   Green Top (Gel)    Collection Time: 06/04/19  6:15 PM   Result Value Ref Range    Extra Tube Hold for add-ons.    Lavender Top    Collection Time: 06/04/19  6:15 PM   Result Value Ref Range    Extra Tube hold for add-on        I ordered the above labs and reviewed the results    RADIOLOGY  XR Shoulder 2+ View Left   Final Result        XR L shoulder negative acute for Fx.     I ordered the above noted radiological studies and reviewed the images on the PACS system.       EKG    ekg was interpreted by Dr. Colvin, see Dr. Colvin note for interpretation.    PROGRESS AND CONSULTS    1925: Reviewed pt's history and workup with Dr. Colvin.  At bedside evaluation, they agree with the plan of care.    2003  Rechecked pt. Pt is resting comfortably in NAD. Notified pt of XR L shoulder that shows no acute Fx. Discussed the plan to discharge the pt home with prescriptions for Norco and Flexeril. I instructed the pt to f/u with PCP. Pt understands and agrees with the plan, all questions answered.      Reviewed implications of results, diagnosis, meds, responsibility to follow up, warning signs and symptoms of possible worsening, potential complications and reasons to return to ER with patient.  Discussed all results and noted any abnormalities with patient.  Discussed absolute need to recheck abnormalities with PCP.    Discussed plan for discharge, as there is no emergent indication for admission.  Pt is agreeable and understands need for follow up and repeat testing.  Pt is aware that discharge does not mean that nothing is wrong but it indicates no emergency is present.  Pt is discharged with instructions to follow up with primary care doctor to have their blood pressure rechecked.       DIAGNOSIS  Final diagnoses:   Musculoskeletal pain       FOLLOW UP   Eric Matta MD  [address] [address]  [phone]    In 1 day        RX     Medication List      New Prescriptions    cyclobenzaprine  "10 MG tablet  Commonly known as:  FLEXERIL  Take 1 tablet by mouth 3 (Three) Times a Day As Needed for Muscle Spasms.     HYDROcodone-acetaminophen 5-325 MG per tablet  Commonly known as:  NORCO  Take 1 tablet by mouth Every 4 (Four) Hours As Needed for Moderate Pain .          Jaylen report [id number] reviewed.  Risks, benefits, alternatives discussed with patient.  Pt consents to treatment and agrees to follow up with PMD tomorrow for further care and any other prescriptions.         COURSE & MEDICAL DECISION MAKING  Pertinent Imaging studies that were ordered and reviewed are noted above.  Results were reviewed/discussed with the patient and they were also made aware of online assess.       MEDICATIONS GIVEN IN ER  Medications   HYDROcodone-acetaminophen (NORCO) 7.5-325 MG per tablet 1 tablet (1 tablet Oral Given 6/4/19 1932)   cyclobenzaprine (FLEXERIL) tablet 10 mg (10 mg Oral Given 6/4/19 1931)       /65   Pulse 94   Temp 97.7 °F (36.5 °C) (Tympanic)   Resp 18   Ht 165.1 cm (65\")   Wt 59.9 kg (132 lb)   SpO2 96%   BMI 21.97 kg/m²       I personally reviewed the past medical history, past surgical history, social history, family history, current medications and allergies as they appear in this chart.  The scribe's note accurately reflects the work and decisions made by me.     Documentation assistance provided by gabriela Garcia for MANUELA Manley on 6/4/2019 at 8:02 PM. Information recorded by the scribe was done at my direction and has been verified and validated by me.            Danilo Garcia  06/04/19 2004       Lorena Damon APRN  06/05/19 1033    "

## 2019-06-04 NOTE — ED PROVIDER NOTES
Pt presents to the ED c/o intermittent left posterior shoulder pain that began earlier this morning after she awoke from sleep. Patient denies known injury or recent trauma. Patient reports the pain only occurs with movement. The patient denies pain when at rest. Patient also complains of nausea/vomiting yesterday. Patient denies chest pain, fever, chills, or shortness of breath. Patient reports she had a recent stroke which affected her vision.    On exam, patient is in mild distress. Cardio is RRR without any murmurs. Muscle spasm and tenderness on her left levator scapulae.  strength is equal. Abdomen is benign.     I agree with the plan to obtain a L Shoulder XR for further evaluation prior to disposition.    EKG          EKG time: 1721  Rhythm/Rate: NSR, 66  P waves and PA: nml; nml  QRS, axis: nml; nml   ST and T waves: nml; nml     Interpreted Contemporaneously by me, independently viewed  Unchanged compared to prior from 05/14/19.      Attestation:    The SOLANGE and I have discussed this patient's history, physical exam, and treatment plan.  I have reviewed the documentation and personally had a face to face interaction with the patient. I affirm the documentation and agree with the treatment and plan.  The attached note describes my personal findings.    Documentation assistance provided by gabriela Sinha for Dr. Marii MD. Information recorded by the scribe was done at my direction and has been verified and validated by me.       Winter Sinha  06/04/19 2002       Ricardo Colvin MD  06/04/19 6302

## 2019-06-04 NOTE — DISCHARGE INSTRUCTIONS
Medications as ordered  Continue current home medications  Heat or ice to right shoulder  Activity as tolerated  Follow up with pmd in 3-5 days if symptoms not improving  Return to er for chest pain, shortness of air, worsening pain or any new or worsening symptoms

## 2019-06-06 ENCOUNTER — TELEPHONE (OUTPATIENT)
Dept: NEUROLOGY | Facility: CLINIC | Age: 74
End: 2019-06-06

## 2019-06-06 ENCOUNTER — READMISSION MANAGEMENT (OUTPATIENT)
Dept: CALL CENTER | Facility: HOSPITAL | Age: 74
End: 2019-06-06

## 2019-06-06 NOTE — OUTREACH NOTE
Stroke Week 3 Survey      Responses   Facility patient discharged from?  [facility]   Does the patient have one of the following disease processes/diagnoses(primary or secondary)?  Stroke (TIA)   Week 3 attempt successful?  Yes   Call start time  1247   Call end time  1251   Discharge diagnosis  Acute ischemic stroke    Is patient permission given to speak with other caregiver?  No   Meds reviewed with patient/caregiver?  Yes   Is the patient having any side effects they believe may be caused by any medication additions or changes?  No   Does the patient have all medications ordered at discharge?  Yes   Is the patient taking all medications as directed (includes completed medication regime)?  Yes   Does the patient have a primary care provider?   Yes   Does the patient have an appointment with their PCP within 7 days of discharge?  Yes   Has the patient kept scheduled appointments due by today?  N/A   Psychosocial issues?  No   Does the patient require any assistance with activities of daily living such as eating, bathing, dressing, walking, etc.?  Yes   Does the patient have any residual symptoms from stroke/TIA?  Yes   Does the patient understand the diet ordered at discharge?  Yes   Did the patient receive a copy of their discharge instructions?  Yes   Nursing interventions  Reviewed instructions with patient   What is the patient's perception of their health status since discharge?  Improving   Nursing interventions  Nurse provided patient education   Is the patient able to teach back FAST for Stroke?  Yes   Is the patient/caregiver able to teach back the risk factors for a stroke?  High blood pressure-goal below 120/80, High Cholesterol, Diabetes   Is the patient/caregiver able to teach back signs and symptoms related to disease process for when to call PCP?  Yes   Is the patient/caregiver able to teach back signs and symptoms related to disease process for when to call 911?  Yes   Is the patient/caregiver able  to teach back the hierarchy of who to call/visit for symptoms/problems? PCP, Specialist, Home health nurse, Urgent Care, ED, 911  Yes   Week 3 call completed?  Yes   Revoked  No further contact(revokes)-requires comment   Graduated/Revoked comments  Please do not call back, I need to rest           Maya Small RN

## 2019-06-07 NOTE — TELEPHONE ENCOUNTER
Two Week Stroke Phone Call  Spoke with the patient  · Admission Date: 5/21/2019  · Discharge Date: 5/22/2019  · Discharge Destination: Home  · Meds reviewed with patient/caregiver?    [x]Yes [] No   o Antiplatelet:  Aspirin  o Cholesterol Reducing: Lipitor  - Understands purpose     [x]  Yes     []  No    - Understands how to take      [x]  Yes     []  No   o Anit-Coagulate: Eliquis  - Understands purpose     [x]  Yes     []  No    - Understands how to take      [x]  Yes     []  No     · Is the patient taking all medication as directed?   [x]  Yes  []  No  · Discussed personal risk factors   [x]  Yes []  No    o High blood pressure   - Bp goal <130/80  o Diabetes   o High cholesterol   - Review desired LDL goal <70  o Atrial fibrillation   • Discussed signs and symptoms of stroke and when patient to call 911?   [x]  Yes []  No  o Sudden weakness or numbness of the face, arm, or leg especially on one side of the body  o Sudden confusion, trouble speaking or understanding  o Sudden trouble seeing in one or both eyes   o Sudden trouble walking, dizziness, loss of balance or coordination  o Sudden severe headaches with no known cause    Notified Patient that if any of these symptoms occur to call 911  · Does the patient have any new signs or symptoms of a stroke?   []  Yes     [x]  No  · Does the patient have an appointment with PCP?  []  Yes     [x]  No   Encouraged patient to see PCP   · Does the patient have 2-3 week Stroke Clinic appointment?  [x]  Yes     []  No   Scheduled with Maine Starr 7/11. Paperwork mailed  · Is the patient currently in therapy, outpatient, or home health?  []  Yes     [x]  No    Needs a referral?      []  Yes     [x]  No   Does the patient have increasing stiffness in your arms, hands, or legs?    []  Yes     [x]  No   Is this interfering with activities of daily living?   []  Yes     [x]  No  Patient Satisfaction   · How would you rate your satisfaction with the instructions provided  about your specific risk factors for stroke?   []Poor  [x] Fair    [] Good [] Very Good  [] Excellent   · How would you rate your satisfaction with the instructions provided on the warnings signs and symptoms of stroke?   []Poor  [x] Fair   [] Good [] Very Good  [] Excellent   · How well did we explain the importance of calling 911 to activate the emergency medical system for new signs and symptoms of stroke?    []Poor  [x] Fair   [] Good [] Very Good  [] Excellent   · Would you recommend the stroke center to your friends and family?   []Definitely Would Not  [] Probably Would Not  [] Neutral   [x]  Probably Would [] Definitely Would

## 2019-06-17 ENCOUNTER — HOSPITAL ENCOUNTER (OUTPATIENT)
Dept: CARDIOLOGY | Facility: HOSPITAL | Age: 74
Discharge: HOME | End: 2019-06-17
Admitting: NURSE PRACTITIONER

## 2019-06-17 DIAGNOSIS — I65.22 STENOSIS OF LEFT CAROTID ARTERY: ICD-10-CM

## 2019-06-17 LAB
BH CV XLRA MEAS LEFT CCA RATIO VEL: 75 CM/SEC
BH CV XLRA MEAS LEFT DIST CCA EDV: 14.1 CM/SEC
BH CV XLRA MEAS LEFT DIST CCA PSV: 75 CM/SEC
BH CV XLRA MEAS LEFT DIST ICA EDV: -13 CM/SEC
BH CV XLRA MEAS LEFT DIST ICA PSV: -52.6 CM/SEC
BH CV XLRA MEAS LEFT ICA RATIO VEL: -526 CM/SEC
BH CV XLRA MEAS LEFT ICA/CCA RATIO: -7
BH CV XLRA MEAS LEFT MID ICA EDV: -18.5 CM/SEC
BH CV XLRA MEAS LEFT MID ICA PSV: -80.6 CM/SEC
BH CV XLRA MEAS LEFT PROX CCA EDV: 10.6 CM/SEC
BH CV XLRA MEAS LEFT PROX CCA PSV: 68 CM/SEC
BH CV XLRA MEAS LEFT PROX ECA PSV: -147 CM/SEC
BH CV XLRA MEAS LEFT PROX ICA EDV: -136 CM/SEC
BH CV XLRA MEAS LEFT PROX ICA PSV: -526 CM/SEC
BH CV XLRA MEAS LEFT PROX SCLA PSV: 169 CM/SEC
BH CV XLRA MEAS LEFT VERTEBRAL A EDV: 11.7 CM/SEC
BH CV XLRA MEAS LEFT VERTEBRAL A PSV: 59.8 CM/SEC
BH CV XLRA MEAS RIGHT CCA RATIO VEL: 85 CM/SEC
BH CV XLRA MEAS RIGHT DIST CCA EDV: 18.8 CM/SEC
BH CV XLRA MEAS RIGHT DIST CCA PSV: 85 CM/SEC
BH CV XLRA MEAS RIGHT DIST ICA EDV: -23.1 CM/SEC
BH CV XLRA MEAS RIGHT DIST ICA PSV: -73.4 CM/SEC
BH CV XLRA MEAS RIGHT ICA RATIO VEL: -164 CM/SEC
BH CV XLRA MEAS RIGHT ICA/CCA RATIO: -1.9
BH CV XLRA MEAS RIGHT MID ICA EDV: 22 CM/SEC
BH CV XLRA MEAS RIGHT MID ICA PSV: 116 CM/SEC
BH CV XLRA MEAS RIGHT PROX CCA EDV: 11.1 CM/SEC
BH CV XLRA MEAS RIGHT PROX CCA PSV: 75 CM/SEC
BH CV XLRA MEAS RIGHT PROX ECA PSV: 212 CM/SEC
BH CV XLRA MEAS RIGHT PROX ICA EDV: -22.6 CM/SEC
BH CV XLRA MEAS RIGHT PROX ICA PSV: -164 CM/SEC
BH CV XLRA MEAS RIGHT PROX SCLA PSV: 174 CM/SEC
BH CV XLRA MEAS RIGHT VERTEBRAL A EDV: 11.4 CM/SEC
BH CV XLRA MEAS RIGHT VERTEBRAL A PSV: 55.8 CM/SEC
LEFT ARM BP: NORMAL MMHG
RIGHT ARM BP: NORMAL MMHG

## 2019-06-17 PROCEDURE — 93880 EXTRACRANIAL BILAT STUDY: CPT

## 2019-06-25 ENCOUNTER — OFFICE VISIT (OUTPATIENT)
Dept: CARDIOLOGY | Facility: CLINIC | Age: 74
End: 2019-06-25

## 2019-06-25 VITALS
HEIGHT: 65 IN | WEIGHT: 134 LBS | DIASTOLIC BLOOD PRESSURE: 64 MMHG | HEART RATE: 70 BPM | BODY MASS INDEX: 22.33 KG/M2 | SYSTOLIC BLOOD PRESSURE: 100 MMHG | RESPIRATION RATE: 16 BRPM

## 2019-06-25 DIAGNOSIS — I10 ESSENTIAL HYPERTENSION: ICD-10-CM

## 2019-06-25 DIAGNOSIS — E78.2 MIXED HYPERLIPIDEMIA: ICD-10-CM

## 2019-06-25 DIAGNOSIS — I63.9 CEREBROVASCULAR ACCIDENT (CVA), UNSPECIFIED MECHANISM (HCC): Primary | ICD-10-CM

## 2019-06-25 DIAGNOSIS — I65.23 BILATERAL CAROTID ARTERY STENOSIS: ICD-10-CM

## 2019-06-25 DIAGNOSIS — E11.9 TYPE 2 DIABETES MELLITUS WITHOUT COMPLICATION, WITHOUT LONG-TERM CURRENT USE OF INSULIN (HCC): ICD-10-CM

## 2019-06-25 PROCEDURE — 93000 ELECTROCARDIOGRAM COMPLETE: CPT | Performed by: INTERNAL MEDICINE

## 2019-06-25 PROCEDURE — 99214 OFFICE O/P EST MOD 30 MIN: CPT | Performed by: INTERNAL MEDICINE

## 2019-06-25 NOTE — PROGRESS NOTES
PATIENTINFORMATION    Date of Office Visit: 2019  Encounter Provider: Veronika Perez MD  Place of Service: [facility]  Patient Name: Darby Workman  : 1945    Subjective:     Encounter Date:2019      Patient ID: Darby Workman is a 74 y.o. female.      History of Present Illness    This is a nice lady I saw while she was hospitalized with retinal artery occlusion.  She had a transthoracic echocardiogram which revealed normal LV systolic function, grade II diastolic dysfunction and some age related changes.  She underwent a transesophageal echocardiogram which revealed normal LV systolic function without significant valve disease; am atheroma was noted in the aortic arch.  Renal artery Doppler was normal.  She wore a Zio patch which revealed some short bursts of atrial tachycardia but no atrial fibrillation or ventricular tachycardia.  She was readmitted to the hospital with headache and at that time a carotid Doppler was performed which revealed severe stenosis of the left internal carotid artery and moderate stenosis of the right internal carotid artery.      She comes in today for followup.  She has been feeling well.  She has not had any further stroke-like symptoms.  She was placed on Eliquis and has not had any bleeding issues.  She denies shortness of breath, palpitations, chest pain or edema.  She does have some fatigue and feels better when she rests.        Review of Systems   Constitution: Positive for decreased appetite and malaise/fatigue. Negative for fever, weight gain and weight loss.   HENT: Positive for hearing loss. Negative for ear pain, nosebleeds and sore throat.    Eyes: Positive for blurred vision. Negative for double vision, pain, vision loss in left eye and vision loss in right eye.   Cardiovascular:        See history of present illness.   Respiratory: Positive for shortness of breath. Negative for cough, sleep disturbances  "due to breathing, snoring and wheezing.    Endocrine: Negative for cold intolerance, heat intolerance and polyuria.   Skin: Negative for itching, poor wound healing and rash.   Musculoskeletal: Negative for joint pain, joint swelling and myalgias.   Gastrointestinal: Negative for abdominal pain, diarrhea, hematochezia, nausea and vomiting.   Genitourinary: Negative for hematuria and hesitancy.   Neurological: Positive for excessive daytime sleepiness and dizziness. Negative for numbness, paresthesias and seizures.   Psychiatric/Behavioral: Negative for depression. The patient is not nervous/anxious.            ECG 12 Lead  Date/Time: 6/25/2019 1:28 PM  Performed by: Veronika Perez MD  Authorized by: Veronika Perez MD   Comparison: compared with previous ECG from 6/4/2019  Similar to previous ECG  Rhythm: sinus rhythm  BPM: 70  Conduction: conduction normal  ST Segments: ST segments normal  Other findings: poor R wave progression    Clinical impression: normal ECG               Objective:     /64 (BP Location: Left arm, Patient Position: Sitting, Cuff Size: Adult)   Pulse 70   Resp 16   Ht 165.1 cm (65\")   Wt 60.8 kg (134 lb)   BMI 22.30 kg/m²  Body mass index is 22.3 kg/m².     Physical Exam   Constitutional: She appears well-developed.   HENT:   Head: Normocephalic and atraumatic.   Eyes: Conjunctivae and lids are normal. Pupils are equal, round, and reactive to light. Lids are everted and swept, no foreign bodies found.   Neck: Normal range of motion. No JVD present. Carotid bruit is not present. No tracheal deviation present. No thyroid mass present.   Cardiovascular: Normal rate, regular rhythm and normal heart sounds.   Pulses:       Dorsalis pedis pulses are 2+ on the right side, and 2+ on the left side.   Pulmonary/Chest: Effort normal and breath sounds normal.   Abdominal: Normal appearance and bowel sounds are normal.   Musculoskeletal: Normal range of motion.   Neurological: She is alert. " She has normal strength.   Skin: Skin is warm, dry and intact.   Psychiatric: She has a normal mood and affect. Her behavior is normal.   Vitals reviewed.          Assessment/Plan:       1. Multi-territory cerebral infarcts.  She is on 40 mg of atorvastatin.  She is anticoagulated with Eliquis.  No cardiac source of emboli was identified on her transesophageal echocardiogram with the exception of significant aortic atheroma.  Zio Patch did not show atrial fibrillation.    2. Carotid artery stenosis.  She has a follow-up appointment with Dr. Oliva later this month to consider revascularization.    3. Hypertension.  Her blood pressure is currently well controlled.  I am not going to make any changes to her medications at this time but, if her blood pressure is low to where it is causing her symptoms, I would consider cutting back before coming off hydralazine.    4. Hyperlipidemia.    5. Aortic arch atheroma.    She is stable from a cardiac standpoint.  I do not plan on doing any further testing.  I will see her back as needed.      While she was in the hospital, we had a pleasant conversation about genealogy.  She had some fascinating stories about her mother getting out of [place] when she was six months old and her father’s family who lived through the [place].  She reminded me today about the [employer] in [place].      Orders Placed This Encounter   Procedures   • ECG 12 Lead     This order was created via procedure documentation        Discharge Medications           Accurate as of 6/25/19  2:20 PM. If you have any questions, ask your nurse or doctor.               Continue These Medications      Instructions Start Date   amLODIPine 10 MG tablet  Commonly known as:  NORVASC   10 mg, Oral, Every 24 Hours Scheduled      apixaban 5 MG tablet tablet  Commonly known as:  ELIQUIS   5 mg, Oral, Every 12 Hours Scheduled      atorvastatin 40 MG tablet  Commonly known as:  LIPITOR   40 mg, Oral, Nightly,  after three months resume 20 mg a day dosing      brimonidine 0.2 % ophthalmic solution  Commonly known as:  ALPHAGAN   1 drop, Both Eyes, 2 Times Daily      cyclobenzaprine 10 MG tablet  Commonly known as:  FLEXERIL   10 mg, Oral, 3 Times Daily PRN      dorzolamide 2 % ophthalmic solution  Commonly known as:  TRUSOPT   1 drop, Both Eyes, 2 Times Daily      glipiZIDE 10 MG tablet  Commonly known as:  GLUCOTROL   10 mg, Oral, 2 Times Daily Before Meals      hydrALAZINE 50 MG tablet  Commonly known as:  APRESOLINE   50 mg, Oral, Every 8 Hours Scheduled      HYDROcodone-acetaminophen 5-325 MG per tablet  Commonly known as:  NORCO   1 tablet, Oral, Every 4 Hours PRN      losartan-hydrochlorothiazide 100-25 MG per tablet  Commonly known as:  HYZAAR   1 tablet, Oral, Daily      metFORMIN 1000 MG tablet  Commonly known as:  GLUCOPHAGE   1,000 mg, Oral, 2 Times Daily With Meals      nebivolol 20 MG tablet  Commonly known as:  BYSTOLIC   20 mg, Oral, Daily                    Veronika Perez MD  06/25/19  2:20 PM

## 2019-06-28 ENCOUNTER — TELEPHONE (OUTPATIENT)
Dept: NEUROSURGERY | Facility: CLINIC | Age: 74
End: 2019-06-28

## 2019-06-28 ENCOUNTER — OFFICE VISIT (OUTPATIENT)
Dept: NEUROSURGERY | Facility: CLINIC | Age: 74
End: 2019-06-28

## 2019-06-28 VITALS
DIASTOLIC BLOOD PRESSURE: 50 MMHG | BODY MASS INDEX: 21.83 KG/M2 | HEART RATE: 72 BPM | WEIGHT: 131 LBS | HEIGHT: 65 IN | SYSTOLIC BLOOD PRESSURE: 120 MMHG | RESPIRATION RATE: 16 BRPM

## 2019-06-28 DIAGNOSIS — I63.9 ACUTE CEREBROVASCULAR ACCIDENT (CVA) OF CEREBELLUM (HCC): Primary | ICD-10-CM

## 2019-06-28 DIAGNOSIS — I16.9 HYPERTENSIVE CRISIS: ICD-10-CM

## 2019-06-28 DIAGNOSIS — I63.232 CEREBRAL INFARCTION DUE TO STENOSIS OF LEFT CAROTID ARTERY (HCC): ICD-10-CM

## 2019-06-28 DIAGNOSIS — I63.10 CEREBROVASCULAR ACCIDENT (CVA) DUE TO EMBOLISM OF PRECEREBRAL ARTERY (HCC): ICD-10-CM

## 2019-06-28 DIAGNOSIS — E11.9 TYPE 2 DIABETES MELLITUS WITHOUT COMPLICATION, WITHOUT LONG-TERM CURRENT USE OF INSULIN (HCC): ICD-10-CM

## 2019-06-28 PROBLEM — Z79.01 ANTICOAGULATED BY ANTICOAGULATION TREATMENT: Status: ACTIVE | Noted: 2019-06-28

## 2019-06-28 PROCEDURE — 99215 OFFICE O/P EST HI 40 MIN: CPT | Performed by: NEUROLOGICAL SURGERY

## 2019-06-28 RX ORDER — VANCOMYCIN HYDROCHLORIDE 1 G/200ML
15 INJECTION, SOLUTION INTRAVENOUS ONCE
Status: CANCELLED | OUTPATIENT
Start: 2019-07-17 | End: 2019-06-28

## 2019-06-28 RX ORDER — SODIUM CHLORIDE, SODIUM LACTATE, POTASSIUM CHLORIDE, CALCIUM CHLORIDE 600; 310; 30; 20 MG/100ML; MG/100ML; MG/100ML; MG/100ML
100 INJECTION, SOLUTION INTRAVENOUS CONTINUOUS
Status: CANCELLED | OUTPATIENT
Start: 2019-07-17

## 2019-06-28 NOTE — PROGRESS NOTES
"Subjective   Patient ID: Darby Workman is a 74 y.o. female is here today for a hospital  follow-up for carotid stenois. Pt is unaccompanied.    History of Present Illness     She presents the office today for first hospital follow-up after presenting with right-sided headache, instability and blurred vision.  She has a history of strokes.  She is on statin and aspirin.  She was prescribed Plavix but does not appear to be taking them at this time.  She is now on Eliquis 5 mg twice daily.  She underwent MRA head and neck imaging on May 14 revealing severe stenosis in the left internal carotid artery.    During hospitalization echocardiography demonstrated significant aortic atheromas that could have been the source of the embolic strokes that she had.    She states that she is doing and feeling well.  She denies any balance or gait instability at this time though does report her \"coordination\" is not as good as it used to be.  She does report ongoing visual changes with blurriness and halos.  She reports that her vision is worse out of the right eye.  She has a history of macular degeneration and has undergone \"multiple laser surgeries.\"  Vision is pretty clear with the left eye.  She denies any weakness of her arms or legs.  She is followed closely by cardiology and no cardiac source of emboli was identified on her transesophageal echocardiogram.    She presents unaccompanied.      /50 (BP Location: Right arm, Patient Position: Sitting, Cuff Size: Adult)   Pulse 72   Resp 16   Ht 165.1 cm (65\")   Wt 59.4 kg (131 lb)   BMI 21.80 kg/m²     The following portions of the patient's history were reviewed and updated as appropriate: allergies, current medications, past family history, past medical history, past social history, past surgical history and problem list.    Review of Systems   Eyes: Positive for visual disturbance (bilateral blurred).   Musculoskeletal: Positive for gait problem (wobbly at times). "   Neurological: Positive for dizziness. Negative for tremors, seizures, syncope, facial asymmetry, speech difficulty, weakness, light-headedness, numbness and headaches.   Psychiatric/Behavioral: Negative for confusion and decreased concentration.     Past Medical History:   Diagnosis Date   • Acute cerebrovascular accident (CVA) of cerebellum (CMS/HCC)    • Acute ischemic stroke (CMS/HCC)    • CVA (cerebral vascular accident) (CMS/HCC)    • Diabetes mellitus (CMS/HCC)    • Hyperlipidemia    • Hypertension        History reviewed. No pertinent surgical history.    Social History     Socioeconomic History   • Marital status:      Spouse name: Not on file   • Number of children: Not on file   • Years of education: Not on file   • Highest education level: Not on file   Occupational History   • Occupation: retired   Tobacco Use   • Smoking status: Never Smoker   • Smokeless tobacco: Never Used   Substance and Sexual Activity   • Alcohol use: No     Frequency: Never   • Drug use: No       Family History   Problem Relation Age of Onset   • Breast cancer Neg Hx         No Known Allergies      Current Outpatient Medications:   •  amLODIPine (NORVASC) 10 MG tablet, Take 1 tablet by mouth Daily., Disp: 30 tablet, Rfl: 0  •  apixaban (ELIQUIS) 5 MG tablet tablet, Take 1 tablet by mouth Every 12 (Twelve) Hours., Disp: 60 tablet, Rfl:   •  atorvastatin (LIPITOR) 40 MG tablet, Take 1 tablet by mouth Every Night. after three months resume 20 mg a day dosing, Disp: 30 tablet, Rfl: 2  •  brimonidine (ALPHAGAN) 0.2 % ophthalmic solution, Administer 1 drop to both eyes 2 (Two) Times a Day., Disp: , Rfl:   •  cyclobenzaprine (FLEXERIL) 10 MG tablet, Take 1 tablet by mouth 3 (Three) Times a Day As Needed for Muscle Spasms., Disp: 15 tablet, Rfl: 0  •  dorzolamide (TRUSOPT) 2 % ophthalmic solution, Administer 1 drop to both eyes 2 (Two) Times a Day., Disp: , Rfl:   •  glipiZIDE (GLUCOTROL) 10 MG tablet, Take 10 mg by mouth 2 (Two)  "Times a Day Before Meals., Disp: , Rfl:   •  hydrALAZINE (APRESOLINE) 50 MG tablet, Take 1 tablet by mouth Every 8 (Eight) Hours., Disp: 90 tablet, Rfl: 0  •  losartan-hydrochlorothiazide (HYZAAR) 100-25 MG per tablet, Take 1 tablet by mouth Daily., Disp: 30 tablet, Rfl: 0  •  metFORMIN (GLUCOPHAGE) 1000 MG tablet, Take 1,000 mg by mouth 2 (Two) Times a Day With Meals., Disp: , Rfl:   •  nebivolol (BYSTOLIC) 20 MG tablet, Take 20 mg by mouth Daily., Disp: , Rfl:   •  HYDROcodone-acetaminophen (NORCO) 5-325 MG per tablet, Take 1 tablet by mouth Every 4 (Four) Hours As Needed for Moderate Pain ., Disp: 12 tablet, Rfl: 0  Objective      /50 (BP Location: Right arm, Patient Position: Sitting, Cuff Size: Adult)   Pulse 72   Resp 16   Ht 165.1 cm (65\")   Wt 59.4 kg (131 lb)   BMI 21.80 kg/m²     Physical Exam   Constitutional: She is oriented to person, place, and time. Vital signs are normal. She appears well-developed and well-nourished. She is cooperative.  Non-toxic appearance. She does not have a sickly appearance. She does not appear ill.   Pleasant older female   HENT:   Head: Normocephalic and atraumatic.   Eyes: EOM are normal. Pupils are equal, round, and reactive to light.   Neck: Normal range of motion. Neck supple. No tracheal deviation present.   Cardiovascular: Normal rate, regular rhythm, normal heart sounds and intact distal pulses.   Pulmonary/Chest: Effort normal.   Musculoskeletal: Normal range of motion.   Moving all extremities well   Neurological: She is alert and oriented to person, place, and time. She has normal strength. She displays no tremor. She has an abnormal Finger-Nose-Finger Test and an abnormal Tandem Gait Test. She has a normal Romberg Test. Coordination abnormal. Gait normal. GCS eye subscore is 4. GCS verbal subscore is 5. GCS motor subscore is 6.   Reflex Scores:       Tricep reflexes are 2+ on the right side and 2+ on the left side.       Bicep reflexes are 2+ on the " right side and 2+ on the left side.       Brachioradialis reflexes are 2+ on the right side and 2+ on the left side.       Patellar reflexes are 2+ on the right side and 2+ on the left side.       Achilles reflexes are 2+ on the right side and 2+ on the left side.  Negative drift, negative tremors  Bilateral upper extremity ataxia  Gait is stable and upright, able to heel and toe walk, able to tandem, though with difficulty  Speech is clear, answering questions and following commands appropriately   Skin: Skin is warm and dry.   Psychiatric: She has a normal mood and affect. Her behavior is normal. Thought content normal.   Vitals reviewed.    Neurologic Exam     Mental Status   Oriented to person, place, and time.   Oriented to person.   Oriented to place.   Level of consciousness: alert    Cranial Nerves     CN II   Visual acuity: decreased  Right visual field deficit: upper temporal quadrant(s)  Left visual field deficit: none     CN III, IV, VI   Pupils are equal, round, and reactive to light.  Extraocular motions are normal.   Right pupil: Size: 4 mm. Shape: regular. Reactivity: brisk.   Left pupil: Size: 4 mm. Shape: regular. Reactivity: brisk.   CN III: no CN III palsy  CN VI: no CN VI palsy  Nystagmus: none     CN V   Facial sensation intact.   Right facial sensation deficit: none  Left facial sensation deficit: none    CN VII   Facial expression full, symmetric.   Right facial weakness: none  Left facial weakness: none    CN VIII   CN VIII normal.   Hearing: intact    CN IX, X   CN IX normal.     CN XI   CN XI normal.     CN XII   Tongue: not atrophic    Motor Exam     Strength   Strength 5/5 throughout.     Gait, Coordination, and Reflexes     Gait  Gait: wide-based    Coordination   Romberg: negative  Finger to nose coordination: abnormal  Tandem walking coordination: abnormal    Reflexes   Right brachioradialis: 2+  Left brachioradialis: 2+  Right biceps: 2+  Left biceps: 2+  Right triceps: 2+  Left  triceps: 2+  Right patellar: 2+  Left patellar: 2+  Right achilles: 2+  Left achilles: 2+      Assessment/Plan   Independent Review of Radiographic Studies:    Reviewed the MRI as well as the MRA of the head neck.  There is evidence of greater than 80% stenosis of the left internal carotid artery just distal to the carotid bifurcation.  Carotid duplex was also reviewed and this demonstrates 80 to 99% stenosis of the left internal carotid artery.  There is evidence on the MRI of multi-territory embolic small strokes.  Medical Decision Making:    I confirmed and obtained the above history as recorded by the nurse practitioner acting as a scribe. I performed the above examination and it is documented by the nurse practitioner acting as a scribe.    The patient returns to the office now stabilized.  She has had no further embolic events clinically.  Carotid duplex evaluation is been accomplished that confirms 80 to 99% stenosis of the left internal carotid artery.    I believe that she is a good candidate for a carotid endarterectomy.    We will obtain clearance from cardiology for her to be off of her Eliquis prior to surgical intervention.  We will leave it up to cardiology for how long she needs to be off of this medication prior to undergoing invasive surgical management of her carotid duplex.  I believe that her anticoagulation can be restarted 2 days postoperatively.    The risks, benefits and alternatives of carotid endarterectomy were explained in detail to the patient. The alternative to carotid endarterectomy is nonsurgical management, usually with blood thinning medications. The benefit of carotid endarterectomy should be, though is not guaranteed, a reduction or elimination of the narrowing in the carotid artery and thus a reduction in the risk of stroke. The risks of endarterectomy include, but are not limited to, the possibility of stroke, death, infection, bleeding at the site of the endarterectomy  requiring reoperation to remove a blood clot in the wound, carotid restenosis, damage to the cranial nerves in the region of the incision resulting in drooping of the lip (marginal mandibular branch palsy), voice hoarseness either temporary or permanent (recurrent laryngeal nerve palsy), tongue deviation (hypoglossal nerve palsy), or sensory nerve damage resulting in permanent numbness in the region of the incision or on the jaw, bleeding to the point of blood transfusion or death, hemorrhage inside the brain as a result of reperfusion of the brain, etcetera. The patient voiced understanding of the risks, alternatives and benefits of this procedure and requests that we proceed with carotid endarterectomy.    After a complete physical exam, the patient has been informed of the consequences, benefits, appropriate us, and office policies regarding the medication being prescribed. A RAJESH check will be made on-line, and will be repeated if prescription is renewed after a 90 day period. The patient agrees to adhering to the medication regimen as prescribed.    The patient has been advised that we will manage post-operative pain for 1 month. If further narcotic medication is needed beyond that period, a referral back to the primary care physician or to a pain management specialist will be made. If the patient cancels or fails to show for scheduled follow-up visits or the pain management referral,  further narcotic prescriptions from this practice may cease.      Plan: Left carotid endarterectomy.    Darby was seen today for hfu carotid stenois.    Diagnoses and all orders for this visit:    Acute cerebrovascular accident (CVA) of cerebellum (CMS/HCC)    Cerebral infarction due to stenosis of left carotid artery (CMS/HCC)  -     Case Request; Standing  -     CBC and Differential; Future  -     Basic metabolic panel; Future  -     Sedimentation rate; Future  -     Type and screen; Future  -     lactated ringers infusion  -      vancomycin (VANCOCIN) 1,000 mg in sodium chloride 0.9 % 250 mL IVPB  -     Case Request    Cerebrovascular accident (CVA) due to embolism of precerebral artery (CMS/[facility])    Hypertensive crisis    Type 2 diabetes mellitus without complication, without long-term current use of insulin (CMS/[facility])    Other orders  -     Inpatient Admission; Standing  -     Follow anesthesia standing orders.  -     Obtain informed consent  -     Provide NPO Instructions to Patient; Future  -     Clorhexidine skin prep; Future  -     Follow anesthesia standing orders.; Standing  -     SCD (sequential compression device)- to be placed on patient in Pre-op; Standing  -     POC Glucose Once; Standing  -     Have patient void prior to transfer; Standing      Return for Recheck 2 weeks after surgery, Follow up with nurse practitioner.

## 2019-06-28 NOTE — TELEPHONE ENCOUNTER
I am Dr. Oliva surgery scheduler and we have scheduled this patient for a carotid endarterectomy on July 17. Dr. Oliva would like direction on how many days to hold Eloquis prior to surgery. Thank you.

## 2019-07-03 ENCOUNTER — RESULTS ENCOUNTER (OUTPATIENT)
Dept: NEUROSURGERY | Facility: CLINIC | Age: 74
End: 2019-07-03

## 2019-07-03 DIAGNOSIS — I63.232 CEREBRAL INFARCTION DUE TO STENOSIS OF LEFT CAROTID ARTERY (HCC): ICD-10-CM

## 2019-07-09 ENCOUNTER — APPOINTMENT (OUTPATIENT)
Dept: PREADMISSION TESTING | Facility: HOSPITAL | Age: 74
End: 2019-07-09

## 2019-07-09 VITALS
OXYGEN SATURATION: 98 % | SYSTOLIC BLOOD PRESSURE: 114 MMHG | WEIGHT: 134 LBS | RESPIRATION RATE: 16 BRPM | TEMPERATURE: 98.2 F | HEIGHT: 65 IN | BODY MASS INDEX: 22.33 KG/M2 | DIASTOLIC BLOOD PRESSURE: 67 MMHG | HEART RATE: 73 BPM

## 2019-07-09 DIAGNOSIS — I63.232 CEREBRAL INFARCTION DUE TO STENOSIS OF LEFT CAROTID ARTERY (HCC): ICD-10-CM

## 2019-07-09 LAB
ABO GROUP BLD: NORMAL
ANION GAP SERPL CALCULATED.3IONS-SCNC: 10.6 MMOL/L (ref 5–15)
BASOPHILS # BLD AUTO: 0.03 10*3/MM3 (ref 0–0.2)
BASOPHILS NFR BLD AUTO: 0.4 % (ref 0–1.5)
BLD GP AB SCN SERPL QL: NEGATIVE
BUN BLD-MCNC: 24 MG/DL (ref 8–23)
BUN/CREAT SERPL: 22.2 (ref 7–25)
CALCIUM SPEC-SCNC: 8.9 MG/DL (ref 8.6–10.5)
CHLORIDE SERPL-SCNC: 102 MMOL/L (ref 98–107)
CO2 SERPL-SCNC: 22.4 MMOL/L (ref 22–29)
CREAT BLD-MCNC: 1.08 MG/DL (ref 0.57–1)
DEPRECATED RDW RBC AUTO: 49.7 FL (ref 37–54)
EOSINOPHIL # BLD AUTO: 0.1 10*3/MM3 (ref 0–0.4)
EOSINOPHIL NFR BLD AUTO: 1.3 % (ref 0.3–6.2)
ERYTHROCYTE [DISTWIDTH] IN BLOOD BY AUTOMATED COUNT: 15.9 % (ref 12.3–15.4)
ERYTHROCYTE [SEDIMENTATION RATE] IN BLOOD: 11 MM/HR (ref 0–30)
GFR SERPL CREATININE-BSD FRML MDRD: 49 ML/MIN/1.73
GLUCOSE BLD-MCNC: 136 MG/DL (ref 65–99)
HCT VFR BLD AUTO: 33.4 % (ref 34–46.6)
HGB BLD-MCNC: 10.6 G/DL (ref 12–15.9)
IMM GRANULOCYTES # BLD AUTO: 0.04 10*3/MM3 (ref 0–0.05)
IMM GRANULOCYTES NFR BLD AUTO: 0.5 % (ref 0–0.5)
LYMPHOCYTES # BLD AUTO: 0.83 10*3/MM3 (ref 0.7–3.1)
LYMPHOCYTES NFR BLD AUTO: 10.5 % (ref 19.6–45.3)
MCH RBC QN AUTO: 27.5 PG (ref 26.6–33)
MCHC RBC AUTO-ENTMCNC: 31.7 G/DL (ref 31.5–35.7)
MCV RBC AUTO: 86.8 FL (ref 79–97)
MONOCYTES # BLD AUTO: 0.56 10*3/MM3 (ref 0.1–0.9)
MONOCYTES NFR BLD AUTO: 7.1 % (ref 5–12)
NEUTROPHILS # BLD AUTO: 6.33 10*3/MM3 (ref 1.7–7)
NEUTROPHILS NFR BLD AUTO: 80.2 % (ref 42.7–76)
NRBC BLD AUTO-RTO: 0 /100 WBC (ref 0–0.2)
PLATELET # BLD AUTO: 277 10*3/MM3 (ref 140–450)
PMV BLD AUTO: 10 FL (ref 6–12)
POTASSIUM BLD-SCNC: 4.6 MMOL/L (ref 3.5–5.2)
RBC # BLD AUTO: 3.85 10*6/MM3 (ref 3.77–5.28)
RH BLD: POSITIVE
SODIUM BLD-SCNC: 135 MMOL/L (ref 136–145)
T&S EXPIRATION DATE: NORMAL
WBC NRBC COR # BLD: 7.89 10*3/MM3 (ref 3.4–10.8)

## 2019-07-09 PROCEDURE — 86900 BLOOD TYPING SEROLOGIC ABO: CPT | Performed by: NEUROLOGICAL SURGERY

## 2019-07-09 PROCEDURE — 85652 RBC SED RATE AUTOMATED: CPT | Performed by: NEUROLOGICAL SURGERY

## 2019-07-09 PROCEDURE — 36415 COLL VENOUS BLD VENIPUNCTURE: CPT | Performed by: NEUROLOGICAL SURGERY

## 2019-07-09 PROCEDURE — 86850 RBC ANTIBODY SCREEN: CPT | Performed by: NEUROLOGICAL SURGERY

## 2019-07-09 PROCEDURE — 86901 BLOOD TYPING SEROLOGIC RH(D): CPT | Performed by: NEUROLOGICAL SURGERY

## 2019-07-09 PROCEDURE — 80048 BASIC METABOLIC PNL TOTAL CA: CPT | Performed by: NEUROLOGICAL SURGERY

## 2019-07-09 PROCEDURE — 85025 COMPLETE CBC W/AUTO DIFF WBC: CPT | Performed by: NEUROLOGICAL SURGERY

## 2019-07-09 RX ORDER — ASPIRIN 81 MG/1
81 TABLET ORAL DAILY
COMMUNITY
End: 2019-07-11

## 2019-07-09 NOTE — DISCHARGE INSTRUCTIONS
Take the following medications the morning of surgery with a small sip of water:    AMLODIPINE, EYE DROPS, HYDRALAZINE, NEBIVOLOL    ARRIVE TO MAIN SURGERY AT 6 AM ON 7/17/19    General Instructions:  • Do not eat or drink anything after midnight the night before surgery.  • Infants may have breast milk up to four hours before surgery.  • Infants drinking formula may drink formula up to six hours before surgery.   • Patients who avoid smoking, chewing tobacco and alcohol for 4 weeks prior to surgery have a reduced risk of post-operative complications.  Quit smoking as many days before surgery as you can.  • Do not smoke, use chewing tobacco or drink alcohol the day of surgery.   • If applicable bring your C-PAP/ BI-PAP machine.  • Bring any papers given to you in the doctor’s office.  • Wear clean comfortable clothes and socks.  • Do not wear contact lenses, false eyelashes or make-up.  Bring a case for your glasses.   • Bring crutches or walker if applicable.  • Remove all piercings.  Leave jewelry and any other valuables at home.  • Hair extensions with metal clips must be removed prior to surgery.  • The Pre-Admission Testing nurse will instruct you to bring medications if unable to obtain an accurate list in Pre-Admission Testing.        If you were given a blood bank ID arm band remember to bring it with you the day of surgery.    Preventing a Surgical Site Infection:  • For 2 to 3 days before surgery, avoid shaving with a razor because the razor can irritate skin and make it easier to develop an infection.    • Any areas of open skin can increase the risk of a post-operative wound infection by allowing bacteria to enter and travel throughout the body.  Notify your surgeon if you have any skin wounds / rashes even if it is not near the expected surgical site.  The area will need assessed to determine if surgery should be delayed until it is healed.  • The night prior to surgery sleep in a clean bed with clean  clothing.  Do not allow pets to sleep with you.  • Shower on the morning of surgery using a fresh bar of anti-bacterial soap (such as Dial) and clean washcloth.  Dry with a clean towel and dress in clean clothing.  • Ask your surgeon if you will be receiving antibiotics prior to surgery.  • Make sure you, your family, and all healthcare providers clean their hands with soap and water or an alcohol based hand  before caring for you or your wound.    Day of surgery:  Upon arrival, a Pre-op nurse and Anesthesiologist will review your health history, obtain vital signs, and answer questions you may have.  The only belongings needed at this time will be your home medications and if applicable your C-PAP/BI-PAP machine.  If you are staying overnight your family can leave the rest of your belongings in the car and bring them to your room later.  A Pre-op nurse will start an IV and you may receive medication in preparation for surgery, including something to help you relax.  Your family will be able to see you in the Pre-op area.  While you are in surgery your family should notify the waiting room  if they leave the waiting room area and provide a contact phone number.    Please be aware that surgery does come with discomfort.  We want to make every effort to control your discomfort so please discuss any uncontrolled symptoms with your nurse.   Your doctor will most likely have prescribed pain medications.      If you are going home after surgery you will receive individualized written care instructions before being discharged.  A responsible adult must drive you to and from the hospital on the day of your surgery and stay with you for 24 hours.    If you are staying overnight following surgery, you will be transported to your hospital room following the recovery period.  [facility] has all private rooms.    You have received a list of surgical assistants for your reference.  If you have  any questions please call Pre-Admission Testing at [phone].  Deductibles and co-payments are collected on the day of service. Please be prepared to pay the required co-pay, deductible or deposit on the day of service as defined by your plan.  2% CHLORAHEXIDINE GLUCONATE* CLOTH  Preparing or “prepping” skin before surgery can reduce the risk of infection at the surgical site. To make the process easier, Psychiatric has chosen disposable cloths moistened with a rinse-free, 2% Chlorhexidine Gluconate (CHG) antiseptic solution. The steps below outline the prepping process and should be carefully followed.        Use the prep cloth on the area that is circled in the diagram             Directions Night before Surgery  1) Shower using a fresh bar of anti-bacterial soap (such as Dial) and clean washcloth.  Use a clean towel to completely dry your skin.  2) Do not use any lotions, oils or creams on your skin.  3) Open the package and remove 1 cloth, wipe your skin for 30 seconds in a circular motion.  Allow to dry for 3 minutes.  4) Repeat #3 with second cloth.  5) Do not touch your eyes, ears, or mouth with the prep cloth.  6) Allow the wet prep solution to air dry.  7) Discard the prep cloth and wash your hands with soap and water.   8) Dress in clean bed clothes and sleep on fresh clean bed sheets.   9) You may experience some temporary itching after the prep.    Directions Day of Surgery  1) Repeat steps 1,2,3,4,5,6,7, and 9.   2) Dress in clean clothes before coming to the hospital.

## 2019-07-11 ENCOUNTER — OFFICE VISIT (OUTPATIENT)
Dept: NEUROLOGY | Facility: CLINIC | Age: 74
End: 2019-07-11

## 2019-07-11 VITALS
BODY MASS INDEX: 21.49 KG/M2 | SYSTOLIC BLOOD PRESSURE: 142 MMHG | DIASTOLIC BLOOD PRESSURE: 58 MMHG | WEIGHT: 129 LBS | HEIGHT: 65 IN | OXYGEN SATURATION: 92 % | HEART RATE: 72 BPM

## 2019-07-11 DIAGNOSIS — I63.9 ACUTE CEREBROVASCULAR ACCIDENT (CVA) OF CEREBELLUM (HCC): ICD-10-CM

## 2019-07-11 DIAGNOSIS — E53.8 VITAMIN B12 DEFICIENCY: Primary | ICD-10-CM

## 2019-07-11 DIAGNOSIS — I10 ESSENTIAL HYPERTENSION: ICD-10-CM

## 2019-07-11 DIAGNOSIS — E11.9 TYPE 2 DIABETES MELLITUS WITHOUT COMPLICATION, WITHOUT LONG-TERM CURRENT USE OF INSULIN (HCC): ICD-10-CM

## 2019-07-11 DIAGNOSIS — E78.2 MIXED HYPERLIPIDEMIA: ICD-10-CM

## 2019-07-11 DIAGNOSIS — R51.9 RIGHT-SIDED HEADACHE: ICD-10-CM

## 2019-07-11 PROCEDURE — 99214 OFFICE O/P EST MOD 30 MIN: CPT | Performed by: NURSE PRACTITIONER

## 2019-07-11 RX ORDER — CLOPIDOGREL BISULFATE 75 MG/1
75 TABLET ORAL DAILY
COMMUNITY
End: 2019-08-29 | Stop reason: HOSPADM

## 2019-07-11 RX ORDER — CYANOCOBALAMIN 1000 UG/ML
1000 INJECTION, SOLUTION INTRAMUSCULAR; SUBCUTANEOUS ONCE
Status: DISCONTINUED | OUTPATIENT
Start: 2019-07-11 | End: 2019-07-31

## 2019-07-11 NOTE — PROGRESS NOTES
DOS: 2019  NAME: Darby Workman   : 1945  PCP: Eric Matta MD    Chief Complaint   Patient presents with   • Stroke      Referring MD: No ref. provider found    Neurological Problem:  74 y.o. right handed female with a Hx of hypertension, hyperlipidemia, stroke, left ICA severe stenosis who presents today for stroke follow-up.  She is accompanied by her  however she request that he wait in the waiting room.  History was provided by patient and review of records as summarized below.    Interval History:  Mrs. Workman presented to [facility] on 2019 with complaints of right-sided headache, loss of balance, blurred vision and was found to have multiple new bilateral embolic strokes on her MRI brain.  She had recently been discharged from [facility] on 2019 after being admitted for a left CR AO and was found to have multiple small bilateral embolic strokes at that time.  MRA at that time revealed a left PCA and left greater than right cervical ICA stenosis that was felt to be incidental to her symptoms.  She did undergo a DANY that showed patient had significant aortic atheroma otherwise unremarkable.  Her EKG showed normal sinus rhythm.  She was sent home with a Zio patch which she was wearing when admitted on  as well as aspirin 81 mg, Plavix 75 mg, and high-dose statin.  Upon arrival a CT head was ordered and showed a questionable new subacute left temporoparietal stroke.  MRI brain was obtained and showed multiple new bilateral embolic strokes with the biggest being moderate sized within her left temporal and left occipital lobes.  Her EKG was normal sinus rhythm.  She did return to her baseline and she was started on Eliquis 5 mg twice daily per cardiology. Dr. Ackerman evaluated the patient and said to continue her ZIO Patch, aspirin 81 mg, Eliquis, and high-dose statin, and put in for a neurosurgery consult for her significant left ICA disease.  He also  ordered some further stroke work-up labs and a temporal artery ultrasound given her recent CRAO and headache.  Her lab work-up was pretty unremarkable except for her vitamin B12 was low normal at 358 and her A1c was elevated at 6.10.  She did not obtain her temporal artery ultrasound.Per her discharge summary she was to stop her Plavix and continue her aspirin 81 mg and Eliquis 5 mg twice daily along with her high-dose statin.  She was to follow-up with neurosurgery, her primary care provider, and our clinic outpatient.    She presents today and is doing fairly well.  She will undergo a left CEA on 7/17 by Dr. Oliva.  She is currently taking Eliquis and Plavix.  She recently stopped her aspirin.  She was recently seen by Dr. Perez cardiology who stated that she was not going to make any changes in her medications and that her blood pressure was being maintained on current regimen.  Her ZIO Patch did not show any evidence of atrial fibrillation.  She states that right after discharge from the hospital in May she had some balance issues however that has since subsided and she has had no falls.  She does still have some right eye blurred vision that she is being followed by her ophthalmologist very closely.  She did have an episode of nausea and vomiting about 4 weeks ago after she ate some fast food and again 2 days ago.  She states she thinks this is due to her sinus issues because she has been fighting a sinus crud along with her .  She did have a right-sided headache today while she was waiting in our waiting room that she thinks was due to her being nervous because it has now resolved.  She states she is very active and she used to be a  and she is trying to get her  more active.  She is anxious to get her surgery over and done with.  She denies any recent stroke/TIA symptoms.  She has been able to go back to mainly all of her ADLs.    Review of Systems:        Review of Systems  "  Constitutional: Positive for fatigue. Negative for activity change, appetite change and unexpected weight change.   HENT: Negative for facial swelling, trouble swallowing and voice change.    Eyes: Negative for photophobia, pain and visual disturbance.   Respiratory: Negative for chest tightness, shortness of breath and wheezing.    Cardiovascular: Negative for chest pain, palpitations and leg swelling.   Gastrointestinal: Negative for abdominal pain, nausea and vomiting.   Endocrine: Negative for polydipsia and polyphagia.   Musculoskeletal: Positive for gait problem. Negative for arthralgias, back pain, joint swelling, myalgias, neck pain and neck stiffness.   Neurological: Positive for headaches. Negative for dizziness, tremors, seizures, syncope, facial asymmetry, speech difficulty, weakness, light-headedness and numbness.   Hematological: Does not bruise/bleed easily.   Psychiatric/Behavioral: Negative for agitation, behavioral problems, confusion, decreased concentration, dysphoric mood, hallucinations, self-injury, sleep disturbance and suicidal ideas. The patient is not nervous/anxious and is not hyperactive.        \"The following portions of the patient's history were reviewed and updated as appropriate: allergies, current medications, past family history, past medical history, past social history, past surgical history and problem list.\"    Review and Interpretation of Imaging:    CT/CTA/CTP: questionable new subacute left temporoparietal stroke    MRI/MRA: multiple new bilateral embolic strokes on her MRI brain; repeat MRI brain: multiple new bilateral embolic strokes with the biggest being moderate sized within her left temporal and left occipital lobes MRA:  left PCA and left greater than right cervical ICA stenosis    TTE: 2D echo showing markedly abnormal mitral valve, EF 54%, mild concentric hypertrophy, normal LA size, no evidence of PFO, saline test negative.    DANY: significant aortic atheroma " otherwise unremarkable    Holter monitor: no evidence of atrial fibrillation    Laboratory Results:             Lab Results   Component Value Date    HGBA1C 6.10 (H) 05/22/2019     Lab Results   Component Value Date    CHOL 120 05/22/2019    CHOL 136 05/15/2019     Lab Results   Component Value Date    HDL 43 05/22/2019    HDL 52 05/15/2019     Lab Results   Component Value Date    LDL 61 05/22/2019    LDL 74 05/15/2019     Lab Results   Component Value Date    TRIG 79 05/22/2019    TRIG 50 05/15/2019     No components found for: CHOLHDL  Lab Results   Component Value Date    RPR Non-Reactive 05/22/2019     Lab Results   Component Value Date    TSH 0.841 05/22/2019     Lab Results   Component Value Date    UPHNVAMM42 350 05/22/2019     Lab Results   Component Value Date    GLUCOSE 136 (H) 07/09/2019    BUN 24 (H) 07/09/2019    CREATININE 1.08 (H) 07/09/2019    EGFRIFNONA 49 (L) 07/09/2019    BCR 22.2 07/09/2019    K 4.6 07/09/2019    CO2 22.4 07/09/2019    CALCIUM 8.9 07/09/2019    ALBUMIN 3.60 05/22/2019    AST 14 05/22/2019    ALT 13 05/22/2019     Lab Results   Component Value Date    WBC 7.89 07/09/2019    HGB 10.6 (L) 07/09/2019    HCT 33.4 (L) 07/09/2019    MCV 86.8 07/09/2019     07/09/2019     Lab Results   Component Value Date    INR 0.99 05/15/2019    PROTIME 12.8 05/15/2019       Physical Examination:   mRS:   General Appearance:   Well developed, well nourished, well groomed, alert, and cooperative  HEENT: Normocephalic. Atraumatic. PERRL.   Cardiac: Regular rate and rhythm.   Extremities:    Equal pulses. No edema.  Respiratory:   Even and unlabored.    Neurological examination:  Higher Integrative  Function: Oriented to time, and person, unable to recall she was at [employer]. Was able to recall 2/3 words. Normal registration, attention span and concentration. Normal language including comprehension, spontaneous speech, repetition, reading, writing, naming and vocabulary. No neglect with normal  visual-spatial function and construction. Normal fund of knowledge and higher integrative function.  CN II: Pupils are equal, round, and reactive to light. Normal visual acuity and visual fields.    CN III IV VI: Extraocular movements are full without nystagmus.   CN V: Normal facial sensation and strength of muscles of mastication.  CN VII: Facial movements are symmetric. No weakness.  CN VIII:   Auditory acuity is normal.  CN IX & X:   Symmetric palatal movement.  CN XI: Sternocleidomastoid and trapezius are normal.  No weakness.  CN XII:   The tongue is midline.  No atrophy or fasciculations.  Motor: Normal muscle strength, bulk and tone in upper and lower extremities.  No fasciculations, rigidity, spasticity, or abnormal movements.  Reflexes: 2+ in the upper and lower extremities. Plantar responses are flexor.  Sensation: Normal to light touch, vibration, temperature, and proprioception in arms and legs.   Station and Gait: Normal gait and station.    Coordination: Finger to nose test shows no dysmetria.  Rapid alternating movements are normal.      Impression/Assessment:    Mrs. Workman is doing fairly well since her last stroke she suffered in May.  She will be undergoing a left CEA by Dr. Oliva on 7/17/2019. Dr. Perez has instructed the patient to quit taking her Eliquis 3 days prior to her procedure with her procedure being on the fourth day.  I have sent Dr. Perez and Dr. Oliva a message regarding antiplatelet therapy and if they would like the patient to continue Plavix monotherapy along with Eliquis. The patient has listed that she is taking aspirin, Plavix, and Eliquis however the patient states she is only taking Plavix and Eliquis.  She states she is still having some right eye blurred vision however she is being followed by her ophthalmologist closely and at this time he is not changed treatment.  Her vitamin B12 level was low while she was in the hospital, so I will order a one-time IM vitamin B12 1000  mics replacement.  Further replacement surveillance will defer to her PCP.  As far as her intermittent right neck swelling I will defer to Dr. Oliva as the patient will have a follow-up carotid duplex postop.  I did asked that the patient speak to Dr. Oliva prior to her procedure on 7/17 about this issue as well.  We discussed at length her personal risk factors for stroke and the importance of risk factor control for stroke prevention, including blood pressure and cholesterol control.  She will monitor blood pressure regularly and follow-up with PCP for continued BP, diabetes, and cholesterol surveillance.  From a neurology standpoint the patient does not need both antiplatelet therapy along with her Eliquis she can just continue her Eliquis and Plavix.  I will await to hear back from Dr. Perez and Dr. Oliva for further recommendations but will defer to cardiology for continued need.  We discussed stroke/TIA symptoms and importance of calling 911 if she were to experience any of these.  She will contact me if her headaches persist and we will discuss headache prevention and treatment at that time.  She can follow-up in 1 year, sooner if symptoms warrant.    Plan:     Vitamin B12 IM 1000mcg x1 dose, further replacement and surveillance will defer to PCP.  Continue Eliquis until she has to discontinue prior to procedure, recommend resuming Eliquis as soon as possible after procedure when okay with neurosurgery  Continue Lipitor  Continue Plavix for now, will await Dr. Perez's recommendations  Monitor blood pressure regularly  Keep well-hydrated  Encourage regular physical activity   Blood pressure control to <130/80   Goal LDL <70-recommend high dose statins-    Serum glucose < 140   Call 911 for stroke any stroke symptoms   Follow-up in 1 year.  Darby was seen today for stroke.    Diagnoses and all orders for this visit:    Vitamin B12 deficiency  -     cyanocobalamin injection 1,000 mcg    Acute cerebrovascular  accident (CVA) of cerebellum (CMS/HCC)    Mixed hyperlipidemia    Essential hypertension    Type 2 diabetes mellitus without complication, without long-term current use of insulin (CMS/HCC)    Right-sided headache        MDM  Reviewed:  previous chart, nursing note and vitals  Reviewed previous: labs, ultrasound, MRI and CT scan  Interpretation: labs, ultrasound, MRI and CT scan      Addendum: I spoke with Dr. Perez and she would like patient to continue Plavix and Eliquis. I will D/C ASA officially off of patient's med list. Further recs will defer to Dr. Oliva for post surgery recs, if he would like to change antiplatelet therapy at that time back to ASA I will defer him to cardiology.    Maine Starr APRN

## 2019-07-17 ENCOUNTER — APPOINTMENT (OUTPATIENT)
Dept: GENERAL RADIOLOGY | Facility: HOSPITAL | Age: 74
End: 2019-07-17

## 2019-07-17 ENCOUNTER — HOSPITAL ENCOUNTER (INPATIENT)
Facility: HOSPITAL | Age: 74
LOS: 14 days | End: 2019-07-31
Attending: NEUROLOGICAL SURGERY | Admitting: NEUROLOGICAL SURGERY

## 2019-07-17 ENCOUNTER — APPOINTMENT (OUTPATIENT)
Dept: CARDIOLOGY | Facility: HOSPITAL | Age: 74
End: 2019-07-17

## 2019-07-17 ENCOUNTER — ANESTHESIA (OUTPATIENT)
Dept: PERIOP | Facility: HOSPITAL | Age: 74
End: 2019-07-17

## 2019-07-17 ENCOUNTER — ANESTHESIA EVENT (OUTPATIENT)
Dept: PERIOP | Facility: HOSPITAL | Age: 74
End: 2019-07-17

## 2019-07-17 DIAGNOSIS — I63.232 CEREBRAL INFARCTION DUE TO STENOSIS OF LEFT CAROTID ARTERY (HCC): ICD-10-CM

## 2019-07-17 DIAGNOSIS — E53.8 VITAMIN B12 DEFICIENCY: ICD-10-CM

## 2019-07-17 LAB
ARTERIAL PATENCY WRIST A: ABNORMAL
ATMOSPHERIC PRESS: 746.5 MMHG
BASE EXCESS BLDA CALC-SCNC: -2.1 MMOL/L (ref 0–2)
BDY SITE: ABNORMAL
BH CV XLRA MEAS LEFT DIST CCA EDV: 17.1 CM/SEC
BH CV XLRA MEAS LEFT DIST CCA PSV: 92.3 CM/SEC
BH CV XLRA MEAS LEFT DIST ICA EDV: 19.7 CM/SEC
BH CV XLRA MEAS LEFT DIST ICA PSV: 72.6 CM/SEC
BH CV XLRA MEAS LEFT MID CCA EDV: 16 CM/SEC
BH CV XLRA MEAS LEFT MID CCA PSV: 65.7 CM/SEC
BH CV XLRA MEAS LEFT MID ICA EDV: 43.6 CM/SEC
BH CV XLRA MEAS LEFT MID ICA PSV: 163 CM/SEC
BH CV XLRA MEAS LEFT PROX ECA EDV: 4.21 CM/SEC
BH CV XLRA MEAS LEFT PROX ECA PSV: 36.6 CM/SEC
BH CV XLRA MEAS LEFT PROX ICA EDV: 13.5 CM/SEC
BH CV XLRA MEAS LEFT PROX ICA PSV: 42.5 CM/SEC
GLUCOSE BLDC GLUCOMTR-MCNC: 132 MG/DL (ref 70–130)
GLUCOSE BLDC GLUCOMTR-MCNC: 194 MG/DL (ref 70–130)
GLUCOSE BLDC GLUCOMTR-MCNC: 216 MG/DL (ref 70–130)
HCO3 BLDA-SCNC: 22.2 MMOL/L (ref 22–28)
HOROWITZ INDEX BLD+IHG-RTO: 100 %
MODALITY: ABNORMAL
O2 A-A PPRESDIFF RESPIRATORY: 0.6 MMHG
PCO2 BLDA: 35.2 MM HG (ref 35–45)
PEEP RESPIRATORY: 5 CM[H2O]
PH BLDA: 7.41 PH UNITS (ref 7.35–7.45)
PO2 BLDA: 391.9 MM HG (ref 80–100)
SAO2 % BLDCOA: 100 % (ref 92–99)
SET MECH RESP RATE: 16
TOTAL RATE: 16 BREATHS/MINUTE
VENTILATOR MODE: ABNORMAL
VT ON VENT VENT: 0.45 ML

## 2019-07-17 PROCEDURE — 94002 VENT MGMT INPAT INIT DAY: CPT

## 2019-07-17 PROCEDURE — C1894 INTRO/SHEATH, NON-LASER: HCPCS | Performed by: NEUROLOGICAL SURGERY

## 2019-07-17 PROCEDURE — 03CL0ZZ EXTIRPATION OF MATTER FROM LEFT INTERNAL CAROTID ARTERY, OPEN APPROACH: ICD-10-PCS | Performed by: NEUROLOGICAL SURGERY

## 2019-07-17 PROCEDURE — 93882 EXTRACRANIAL UNI/LTD STUDY: CPT

## 2019-07-17 PROCEDURE — 25010000002 DEXAMETHASONE PER 1 MG: Performed by: NURSE ANESTHETIST, CERTIFIED REGISTERED

## 2019-07-17 PROCEDURE — 94799 UNLISTED PULMONARY SVC/PX: CPT

## 2019-07-17 PROCEDURE — 5A1945Z RESPIRATORY VENTILATION, 24-96 CONSECUTIVE HOURS: ICD-10-PCS | Performed by: INTERNAL MEDICINE

## 2019-07-17 PROCEDURE — 25010000002 NEOSTIGMINE PER 0.5 MG: Performed by: NURSE ANESTHETIST, CERTIFIED REGISTERED

## 2019-07-17 PROCEDURE — 25010000003 CEFAZOLIN IN DEXTROSE 2-4 GM/100ML-% SOLUTION: Performed by: NURSE ANESTHETIST, CERTIFIED REGISTERED

## 2019-07-17 PROCEDURE — 25010000002 VANCOMYCIN 1 G RECONSTITUTED SOLUTION 1 EACH VIAL: Performed by: NEUROLOGICAL SURGERY

## 2019-07-17 PROCEDURE — 85347 COAGULATION TIME ACTIVATED: CPT

## 2019-07-17 PROCEDURE — 25010000002 HEPARIN (PORCINE) PER 1000 UNITS: Performed by: NEUROLOGICAL SURGERY

## 2019-07-17 PROCEDURE — 25810000003 SODIUM CHLORIDE 0.9 % WITH KCL 20 MEQ 20-0.9 MEQ/L-% SOLUTION: Performed by: NEUROLOGICAL SURGERY

## 2019-07-17 PROCEDURE — 25010000002 FENTANYL CITRATE (PF) 100 MCG/2ML SOLUTION: Performed by: NURSE ANESTHETIST, CERTIFIED REGISTERED

## 2019-07-17 PROCEDURE — 25010000002 SUCCINYLCHOLINE PER 20 MG: Performed by: NURSE ANESTHETIST, CERTIFIED REGISTERED

## 2019-07-17 PROCEDURE — C1760 CLOSURE DEV, VASC: HCPCS | Performed by: NEUROLOGICAL SURGERY

## 2019-07-17 PROCEDURE — C1887 CATHETER, GUIDING: HCPCS | Performed by: NEUROLOGICAL SURGERY

## 2019-07-17 PROCEDURE — C1768 GRAFT, VASCULAR: HCPCS | Performed by: NEUROLOGICAL SURGERY

## 2019-07-17 PROCEDURE — 037L3DZ DILATION OF LEFT INTERNAL CAROTID ARTERY WITH INTRALUMINAL DEVICE, PERCUTANEOUS APPROACH: ICD-10-PCS | Performed by: NEUROLOGICAL SURGERY

## 2019-07-17 PROCEDURE — C1769 GUIDE WIRE: HCPCS | Performed by: NEUROLOGICAL SURGERY

## 2019-07-17 PROCEDURE — 25010000002 PROTAMINE SULFATE PER 10 MG: Performed by: NURSE ANESTHETIST, CERTIFIED REGISTERED

## 2019-07-17 PROCEDURE — C1876 STENT, NON-COA/NON-COV W/DEL: HCPCS | Performed by: NEUROLOGICAL SURGERY

## 2019-07-17 PROCEDURE — 25010000003 LIDOCAINE 1 % SOLUTION 20 ML VIAL: Performed by: NEUROLOGICAL SURGERY

## 2019-07-17 PROCEDURE — 25010000002 HEPARIN (PORCINE) PER 1000 UNITS: Performed by: NURSE ANESTHETIST, CERTIFIED REGISTERED

## 2019-07-17 PROCEDURE — 0BH17EZ INSERTION OF ENDOTRACHEAL AIRWAY INTO TRACHEA, VIA NATURAL OR ARTIFICIAL OPENING: ICD-10-PCS | Performed by: INTERNAL MEDICINE

## 2019-07-17 PROCEDURE — 82962 GLUCOSE BLOOD TEST: CPT

## 2019-07-17 PROCEDURE — 25010000002 VANCOMYCIN PER 500 MG: Performed by: NEUROLOGICAL SURGERY

## 2019-07-17 PROCEDURE — 25010000002 FENTANYL CITRATE (PF) 100 MCG/2ML SOLUTION: Performed by: ANESTHESIOLOGY

## 2019-07-17 PROCEDURE — 25010000002 MIDAZOLAM PER 1 MG: Performed by: NEUROLOGICAL SURGERY

## 2019-07-17 PROCEDURE — 25010000003 LIDOCAINE 1 % SOLUTION: Performed by: NEUROLOGICAL SURGERY

## 2019-07-17 PROCEDURE — 25010000002 HYDROMORPHONE PER 4 MG: Performed by: NEUROLOGICAL SURGERY

## 2019-07-17 PROCEDURE — 03UL0JZ SUPPLEMENT LEFT INTERNAL CAROTID ARTERY WITH SYNTHETIC SUBSTITUTE, OPEN APPROACH: ICD-10-PCS | Performed by: NEUROLOGICAL SURGERY

## 2019-07-17 PROCEDURE — 25010000002 ONDANSETRON PER 1 MG: Performed by: NURSE ANESTHETIST, CERTIFIED REGISTERED

## 2019-07-17 PROCEDURE — 82803 BLOOD GASES ANY COMBINATION: CPT

## 2019-07-17 PROCEDURE — B317YZZ FLUOROSCOPY OF LEFT INTERNAL CAROTID ARTERY USING OTHER CONTRAST: ICD-10-PCS | Performed by: NEUROLOGICAL SURGERY

## 2019-07-17 PROCEDURE — 25010000002 PHENYLEPHRINE PER 1 ML: Performed by: NURSE ANESTHETIST, CERTIFIED REGISTERED

## 2019-07-17 PROCEDURE — 25010000002 CYANOCOBALAMIN PER 1000 MCG: Performed by: NEUROLOGICAL SURGERY

## 2019-07-17 PROCEDURE — 76377 3D RENDER W/INTRP POSTPROCES: CPT

## 2019-07-17 PROCEDURE — 74018 RADEX ABDOMEN 1 VIEW: CPT

## 2019-07-17 PROCEDURE — 63710000001 INSULIN LISPRO (HUMAN) PER 5 UNITS: Performed by: INTERNAL MEDICINE

## 2019-07-17 PROCEDURE — 71045 X-RAY EXAM CHEST 1 VIEW: CPT

## 2019-07-17 PROCEDURE — 37215 TRANSCATH STENT CCA W/EPS: CPT | Performed by: NEUROLOGICAL SURGERY

## 2019-07-17 PROCEDURE — 25010000002 PROPOFOL 10 MG/ML EMULSION: Performed by: NURSE ANESTHETIST, CERTIFIED REGISTERED

## 2019-07-17 PROCEDURE — 0 IODIXANOL PER 1 ML: Performed by: NEUROLOGICAL SURGERY

## 2019-07-17 DEVICE — IMPLANTABLE DEVICE
Type: IMPLANTABLE DEVICE | Site: CAROTID | Status: FUNCTIONAL
Brand: CORDIS PRECISE PRO RX NITINOL STENT SYSTEM

## 2019-07-17 DEVICE — VASCU-GUARD PERIPHERAL VASCULAR PATCH IS PREPARED FROM BOVINE PERICARDIUM WHICH IS CROSS-LINKED WITH GLUTARALDEHYDE. THE PERICARDIUM IS PROCURED FROM CATTLE ORIGINATING IN THE UNITED STATES. VASCU-GUARD PERIPHERAL VASCULAR PATCH IS CHEMICALLY STERILIZED USING ETHANOL AND PROPYLENE OXIDE. VASCU-GUARD PERIPHERAL VASCULAR PATCH HAS BEEN TREATED WITH 1 MOLAR SODIUM HYDROXIDE FOR A MINIMUM OF 60 MINUTES AT 20 - 25°C.  VASCU-GUARD PERIPHERAL VASCULAR PATCH IS PACKAGED IN A CONTAINER FILLED WITH STERILE, NON-PYROGENIC WATER CONTAINING PROPYLENE OXIDE. THE CONTENTS OF THE UNOPENED, UNDAMAGED CONTAINER ARE STERILE.
Type: IMPLANTABLE DEVICE | Site: CAROTID | Status: FUNCTIONAL
Brand: VASCU-GUARD PERIPHERAL VASCULAR PATCH

## 2019-07-17 RX ORDER — OXYCODONE HYDROCHLORIDE AND ACETAMINOPHEN 5; 325 MG/1; MG/1
1 TABLET ORAL EVERY 4 HOURS PRN
Status: DISCONTINUED | OUTPATIENT
Start: 2019-07-17 | End: 2019-07-22

## 2019-07-17 RX ORDER — FAMOTIDINE 10 MG/ML
20 INJECTION, SOLUTION INTRAVENOUS ONCE
Status: COMPLETED | OUTPATIENT
Start: 2019-07-17 | End: 2019-07-17

## 2019-07-17 RX ORDER — NALOXONE HCL 0.4 MG/ML
0.4 VIAL (ML) INJECTION
Status: DISCONTINUED | OUTPATIENT
Start: 2019-07-17 | End: 2019-07-22

## 2019-07-17 RX ORDER — HYDRALAZINE HYDROCHLORIDE 50 MG/1
50 TABLET, FILM COATED ORAL EVERY 8 HOURS SCHEDULED
Status: DISCONTINUED | OUTPATIENT
Start: 2019-07-17 | End: 2019-07-22

## 2019-07-17 RX ORDER — SODIUM CHLORIDE 0.9 % (FLUSH) 0.9 %
1-10 SYRINGE (ML) INJECTION AS NEEDED
Status: DISCONTINUED | OUTPATIENT
Start: 2019-07-17 | End: 2019-07-17 | Stop reason: HOSPADM

## 2019-07-17 RX ORDER — NALOXONE HCL 0.4 MG/ML
0.2 VIAL (ML) INJECTION AS NEEDED
Status: DISCONTINUED | OUTPATIENT
Start: 2019-07-17 | End: 2019-07-17 | Stop reason: HOSPADM

## 2019-07-17 RX ORDER — DORZOLAMIDE HCL 20 MG/ML
1 SOLUTION/ DROPS OPHTHALMIC 2 TIMES DAILY
Status: DISCONTINUED | OUTPATIENT
Start: 2019-07-17 | End: 2019-07-31 | Stop reason: HOSPADM

## 2019-07-17 RX ORDER — CYCLOBENZAPRINE HCL 10 MG
10 TABLET ORAL 3 TIMES DAILY PRN
Status: DISCONTINUED | OUTPATIENT
Start: 2019-07-17 | End: 2019-07-22

## 2019-07-17 RX ORDER — CLOPIDOGREL BISULFATE 75 MG/1
75 TABLET ORAL DAILY
Status: DISCONTINUED | OUTPATIENT
Start: 2019-07-17 | End: 2019-07-21

## 2019-07-17 RX ORDER — ASPIRIN 325 MG
325 TABLET, DELAYED RELEASE (ENTERIC COATED) ORAL DAILY
Status: DISCONTINUED | OUTPATIENT
Start: 2019-07-17 | End: 2019-07-18

## 2019-07-17 RX ORDER — HEPARIN SODIUM 1000 [USP'U]/ML
INJECTION, SOLUTION INTRAVENOUS; SUBCUTANEOUS AS NEEDED
Status: DISCONTINUED | OUTPATIENT
Start: 2019-07-17 | End: 2019-07-17 | Stop reason: SURG

## 2019-07-17 RX ORDER — SODIUM CHLORIDE 0.9 % (FLUSH) 0.9 %
3 SYRINGE (ML) INJECTION EVERY 12 HOURS SCHEDULED
Status: DISCONTINUED | OUTPATIENT
Start: 2019-07-17 | End: 2019-07-31 | Stop reason: HOSPADM

## 2019-07-17 RX ORDER — ONDANSETRON 4 MG/1
4 TABLET, FILM COATED ORAL EVERY 6 HOURS PRN
Status: DISCONTINUED | OUTPATIENT
Start: 2019-07-17 | End: 2019-07-31 | Stop reason: HOSPADM

## 2019-07-17 RX ORDER — DIPHENHYDRAMINE HYDROCHLORIDE 50 MG/ML
12.5 INJECTION INTRAMUSCULAR; INTRAVENOUS
Status: DISCONTINUED | OUTPATIENT
Start: 2019-07-17 | End: 2019-07-17 | Stop reason: HOSPADM

## 2019-07-17 RX ORDER — SODIUM CHLORIDE, SODIUM LACTATE, POTASSIUM CHLORIDE, CALCIUM CHLORIDE 600; 310; 30; 20 MG/100ML; MG/100ML; MG/100ML; MG/100ML
9 INJECTION, SOLUTION INTRAVENOUS CONTINUOUS
Status: DISCONTINUED | OUTPATIENT
Start: 2019-07-17 | End: 2019-07-19

## 2019-07-17 RX ORDER — HYDROMORPHONE HYDROCHLORIDE 1 MG/ML
0.5 INJECTION, SOLUTION INTRAMUSCULAR; INTRAVENOUS; SUBCUTANEOUS
Status: DISCONTINUED | OUTPATIENT
Start: 2019-07-17 | End: 2019-07-17 | Stop reason: HOSPADM

## 2019-07-17 RX ORDER — EPHEDRINE SULFATE 50 MG/ML
5 INJECTION, SOLUTION INTRAVENOUS ONCE AS NEEDED
Status: DISCONTINUED | OUTPATIENT
Start: 2019-07-17 | End: 2019-07-17 | Stop reason: HOSPADM

## 2019-07-17 RX ORDER — ONDANSETRON 2 MG/ML
INJECTION INTRAMUSCULAR; INTRAVENOUS AS NEEDED
Status: DISCONTINUED | OUTPATIENT
Start: 2019-07-17 | End: 2019-07-17 | Stop reason: SURG

## 2019-07-17 RX ORDER — NEBIVOLOL 10 MG/1
20 TABLET ORAL DAILY
Status: DISCONTINUED | OUTPATIENT
Start: 2019-07-17 | End: 2019-07-31 | Stop reason: HOSPADM

## 2019-07-17 RX ORDER — NITROGLYCERIN 0.4 MG/1
0.4 TABLET SUBLINGUAL
Status: DISCONTINUED | OUTPATIENT
Start: 2019-07-17 | End: 2019-07-31 | Stop reason: HOSPADM

## 2019-07-17 RX ORDER — AMLODIPINE BESYLATE 10 MG/1
10 TABLET ORAL
Status: DISCONTINUED | OUTPATIENT
Start: 2019-07-17 | End: 2019-07-22

## 2019-07-17 RX ORDER — LIDOCAINE HYDROCHLORIDE 20 MG/ML
INJECTION, SOLUTION INFILTRATION; PERINEURAL AS NEEDED
Status: DISCONTINUED | OUTPATIENT
Start: 2019-07-17 | End: 2019-07-17 | Stop reason: SURG

## 2019-07-17 RX ORDER — HYDRALAZINE HYDROCHLORIDE 20 MG/ML
5 INJECTION INTRAMUSCULAR; INTRAVENOUS
Status: DISCONTINUED | OUTPATIENT
Start: 2019-07-17 | End: 2019-07-17 | Stop reason: HOSPADM

## 2019-07-17 RX ORDER — PROPOFOL 10 MG/ML
VIAL (ML) INTRAVENOUS AS NEEDED
Status: DISCONTINUED | OUTPATIENT
Start: 2019-07-17 | End: 2019-07-17 | Stop reason: SURG

## 2019-07-17 RX ORDER — PROTAMINE SULFATE 10 MG/ML
INJECTION, SOLUTION INTRAVENOUS AS NEEDED
Status: DISCONTINUED | OUTPATIENT
Start: 2019-07-17 | End: 2019-07-17 | Stop reason: SURG

## 2019-07-17 RX ORDER — NICOTINE POLACRILEX 4 MG
15 LOZENGE BUCCAL
Status: DISCONTINUED | OUTPATIENT
Start: 2019-07-17 | End: 2019-07-31 | Stop reason: HOSPADM

## 2019-07-17 RX ORDER — HEPARIN SODIUM 1000 [USP'U]/ML
INJECTION, SOLUTION INTRAVENOUS; SUBCUTANEOUS CONTINUOUS PRN
Status: DISCONTINUED | OUTPATIENT
Start: 2019-07-17 | End: 2019-07-17

## 2019-07-17 RX ORDER — PROMETHAZINE HYDROCHLORIDE 25 MG/1
25 TABLET ORAL ONCE AS NEEDED
Status: DISCONTINUED | OUTPATIENT
Start: 2019-07-17 | End: 2019-07-17 | Stop reason: HOSPADM

## 2019-07-17 RX ORDER — FENTANYL CITRATE 50 UG/ML
50 INJECTION, SOLUTION INTRAMUSCULAR; INTRAVENOUS
Status: DISCONTINUED | OUTPATIENT
Start: 2019-07-17 | End: 2019-07-17 | Stop reason: HOSPADM

## 2019-07-17 RX ORDER — MIDAZOLAM HYDROCHLORIDE 1 MG/ML
1 INJECTION INTRAMUSCULAR; INTRAVENOUS ONCE
Status: COMPLETED | OUTPATIENT
Start: 2019-07-17 | End: 2019-07-17

## 2019-07-17 RX ORDER — IODIXANOL 320 MG/ML
300 INJECTION, SOLUTION INTRAVASCULAR
Status: COMPLETED | OUTPATIENT
Start: 2019-07-17 | End: 2019-07-17

## 2019-07-17 RX ORDER — PROMETHAZINE HYDROCHLORIDE 25 MG/1
25 SUPPOSITORY RECTAL ONCE AS NEEDED
Status: DISCONTINUED | OUTPATIENT
Start: 2019-07-17 | End: 2019-07-17 | Stop reason: HOSPADM

## 2019-07-17 RX ORDER — LIDOCAINE HYDROCHLORIDE 10 MG/ML
INJECTION, SOLUTION INFILTRATION; PERINEURAL AS NEEDED
Status: DISCONTINUED | OUTPATIENT
Start: 2019-07-17 | End: 2019-07-17 | Stop reason: HOSPADM

## 2019-07-17 RX ORDER — ATORVASTATIN CALCIUM 20 MG/1
40 TABLET, FILM COATED ORAL NIGHTLY
Status: DISCONTINUED | OUTPATIENT
Start: 2019-07-17 | End: 2019-07-18

## 2019-07-17 RX ORDER — ROCURONIUM BROMIDE 10 MG/ML
INJECTION, SOLUTION INTRAVENOUS AS NEEDED
Status: DISCONTINUED | OUTPATIENT
Start: 2019-07-17 | End: 2019-07-17 | Stop reason: SURG

## 2019-07-17 RX ORDER — FLUMAZENIL 0.1 MG/ML
0.2 INJECTION INTRAVENOUS AS NEEDED
Status: DISCONTINUED | OUTPATIENT
Start: 2019-07-17 | End: 2019-07-17 | Stop reason: HOSPADM

## 2019-07-17 RX ORDER — PROMETHAZINE HYDROCHLORIDE 25 MG/ML
6.25 INJECTION, SOLUTION INTRAMUSCULAR; INTRAVENOUS
Status: DISCONTINUED | OUTPATIENT
Start: 2019-07-17 | End: 2019-07-17 | Stop reason: HOSPADM

## 2019-07-17 RX ORDER — VANCOMYCIN HYDROCHLORIDE 1 G/200ML
15 INJECTION, SOLUTION INTRAVENOUS ONCE
Status: COMPLETED | OUTPATIENT
Start: 2019-07-17 | End: 2019-07-17

## 2019-07-17 RX ORDER — SODIUM CHLORIDE, SODIUM LACTATE, POTASSIUM CHLORIDE, CALCIUM CHLORIDE 600; 310; 30; 20 MG/100ML; MG/100ML; MG/100ML; MG/100ML
100 INJECTION, SOLUTION INTRAVENOUS CONTINUOUS
Status: DISCONTINUED | OUTPATIENT
Start: 2019-07-17 | End: 2019-07-19

## 2019-07-17 RX ORDER — GLIPIZIDE 10 MG/1
10 TABLET ORAL
Status: DISCONTINUED | OUTPATIENT
Start: 2019-07-17 | End: 2019-07-31 | Stop reason: HOSPADM

## 2019-07-17 RX ORDER — ACETAMINOPHEN 325 MG/1
650 TABLET ORAL ONCE AS NEEDED
Status: DISCONTINUED | OUTPATIENT
Start: 2019-07-17 | End: 2019-07-17 | Stop reason: HOSPADM

## 2019-07-17 RX ORDER — HYDROCODONE BITARTRATE AND ACETAMINOPHEN 7.5; 325 MG/1; MG/1
1 TABLET ORAL ONCE AS NEEDED
Status: DISCONTINUED | OUTPATIENT
Start: 2019-07-17 | End: 2019-07-17 | Stop reason: HOSPADM

## 2019-07-17 RX ORDER — PROMETHAZINE HYDROCHLORIDE 25 MG/ML
12.5 INJECTION, SOLUTION INTRAMUSCULAR; INTRAVENOUS ONCE AS NEEDED
Status: DISCONTINUED | OUTPATIENT
Start: 2019-07-17 | End: 2019-07-17 | Stop reason: HOSPADM

## 2019-07-17 RX ORDER — ONDANSETRON 2 MG/ML
4 INJECTION INTRAMUSCULAR; INTRAVENOUS ONCE AS NEEDED
Status: DISCONTINUED | OUTPATIENT
Start: 2019-07-17 | End: 2019-07-17 | Stop reason: HOSPADM

## 2019-07-17 RX ORDER — DEXTROSE MONOHYDRATE 25 G/50ML
25 INJECTION, SOLUTION INTRAVENOUS
Status: DISCONTINUED | OUTPATIENT
Start: 2019-07-17 | End: 2019-07-31 | Stop reason: HOSPADM

## 2019-07-17 RX ORDER — CEFAZOLIN SODIUM 2 G/100ML
INJECTION, SOLUTION INTRAVENOUS AS NEEDED
Status: DISCONTINUED | OUTPATIENT
Start: 2019-07-17 | End: 2019-07-17 | Stop reason: SURG

## 2019-07-17 RX ORDER — GLYCOPYRROLATE 0.2 MG/ML
INJECTION INTRAMUSCULAR; INTRAVENOUS AS NEEDED
Status: DISCONTINUED | OUTPATIENT
Start: 2019-07-17 | End: 2019-07-17 | Stop reason: SURG

## 2019-07-17 RX ORDER — SODIUM CHLORIDE AND POTASSIUM CHLORIDE 150; 900 MG/100ML; MG/100ML
60 INJECTION, SOLUTION INTRAVENOUS CONTINUOUS
Status: DISCONTINUED | OUTPATIENT
Start: 2019-07-17 | End: 2019-07-17

## 2019-07-17 RX ORDER — SUCCINYLCHOLINE CHLORIDE 20 MG/ML
INJECTION INTRAMUSCULAR; INTRAVENOUS AS NEEDED
Status: DISCONTINUED | OUTPATIENT
Start: 2019-07-17 | End: 2019-07-17 | Stop reason: SURG

## 2019-07-17 RX ORDER — HYDROMORPHONE HYDROCHLORIDE 1 MG/ML
0.5 INJECTION, SOLUTION INTRAMUSCULAR; INTRAVENOUS; SUBCUTANEOUS
Status: DISCONTINUED | OUTPATIENT
Start: 2019-07-17 | End: 2019-07-22

## 2019-07-17 RX ORDER — DEXAMETHASONE SODIUM PHOSPHATE 10 MG/ML
INJECTION INTRAMUSCULAR; INTRAVENOUS AS NEEDED
Status: DISCONTINUED | OUTPATIENT
Start: 2019-07-17 | End: 2019-07-17 | Stop reason: SURG

## 2019-07-17 RX ORDER — SODIUM CHLORIDE AND POTASSIUM CHLORIDE 150; 900 MG/100ML; MG/100ML
100 INJECTION, SOLUTION INTRAVENOUS CONTINUOUS
Status: DISCONTINUED | OUTPATIENT
Start: 2019-07-17 | End: 2019-07-20

## 2019-07-17 RX ORDER — HYDROCODONE BITARTRATE AND ACETAMINOPHEN 5; 325 MG/1; MG/1
1 TABLET ORAL EVERY 4 HOURS PRN
Status: DISCONTINUED | OUTPATIENT
Start: 2019-07-17 | End: 2019-07-22

## 2019-07-17 RX ORDER — DIPHENHYDRAMINE HCL 25 MG
25 CAPSULE ORAL
Status: DISCONTINUED | OUTPATIENT
Start: 2019-07-17 | End: 2019-07-17 | Stop reason: HOSPADM

## 2019-07-17 RX ORDER — LIDOCAINE HYDROCHLORIDE 10 MG/ML
0.5 INJECTION, SOLUTION EPIDURAL; INFILTRATION; INTRACAUDAL; PERINEURAL ONCE AS NEEDED
Status: DISCONTINUED | OUTPATIENT
Start: 2019-07-17 | End: 2019-07-17 | Stop reason: HOSPADM

## 2019-07-17 RX ORDER — OXYCODONE AND ACETAMINOPHEN 7.5; 325 MG/1; MG/1
1 TABLET ORAL ONCE AS NEEDED
Status: DISCONTINUED | OUTPATIENT
Start: 2019-07-17 | End: 2019-07-17 | Stop reason: HOSPADM

## 2019-07-17 RX ORDER — LIDOCAINE HYDROCHLORIDE 40 MG/ML
SOLUTION TOPICAL AS NEEDED
Status: DISCONTINUED | OUTPATIENT
Start: 2019-07-17 | End: 2019-07-17 | Stop reason: SURG

## 2019-07-17 RX ORDER — EPHEDRINE SULFATE 50 MG/ML
INJECTION, SOLUTION INTRAVENOUS AS NEEDED
Status: DISCONTINUED | OUTPATIENT
Start: 2019-07-17 | End: 2019-07-17 | Stop reason: SURG

## 2019-07-17 RX ORDER — SODIUM CHLORIDE 0.9 % (FLUSH) 0.9 %
3-10 SYRINGE (ML) INJECTION AS NEEDED
Status: DISCONTINUED | OUTPATIENT
Start: 2019-07-17 | End: 2019-07-31 | Stop reason: HOSPADM

## 2019-07-17 RX ORDER — CYANOCOBALAMIN 1000 UG/ML
1000 INJECTION, SOLUTION INTRAMUSCULAR; SUBCUTANEOUS ONCE
Status: COMPLETED | OUTPATIENT
Start: 2019-07-17 | End: 2019-07-17

## 2019-07-17 RX ORDER — LABETALOL HYDROCHLORIDE 5 MG/ML
5 INJECTION, SOLUTION INTRAVENOUS
Status: DISCONTINUED | OUTPATIENT
Start: 2019-07-17 | End: 2019-07-17 | Stop reason: HOSPADM

## 2019-07-17 RX ORDER — BRIMONIDINE TARTRATE 2 MG/ML
1 SOLUTION/ DROPS OPHTHALMIC 2 TIMES DAILY
Status: DISCONTINUED | OUTPATIENT
Start: 2019-07-17 | End: 2019-07-31 | Stop reason: HOSPADM

## 2019-07-17 RX ORDER — ONDANSETRON 2 MG/ML
4 INJECTION INTRAMUSCULAR; INTRAVENOUS EVERY 6 HOURS PRN
Status: DISCONTINUED | OUTPATIENT
Start: 2019-07-17 | End: 2019-07-31 | Stop reason: HOSPADM

## 2019-07-17 RX ADMIN — PHENYLEPHRINE HYDROCHLORIDE 100 MCG: 10 INJECTION INTRAVENOUS at 08:47

## 2019-07-17 RX ADMIN — SODIUM CHLORIDE, POTASSIUM CHLORIDE, SODIUM LACTATE AND CALCIUM CHLORIDE: 600; 310; 30; 20 INJECTION, SOLUTION INTRAVENOUS at 08:00

## 2019-07-17 RX ADMIN — AMLODIPINE BESYLATE 10 MG: 10 TABLET ORAL at 22:04

## 2019-07-17 RX ADMIN — PROPOFOL 30 MG: 10 INJECTION, EMULSION INTRAVENOUS at 08:07

## 2019-07-17 RX ADMIN — ONDANSETRON 4 MG: 2 INJECTION INTRAMUSCULAR; INTRAVENOUS at 08:06

## 2019-07-17 RX ADMIN — IODIXANOL 180 ML: 320 INJECTION, SOLUTION INTRAVASCULAR at 14:15

## 2019-07-17 RX ADMIN — NEOSTIGMINE METHYLSULFATE 2.5 MG: 1 INJECTION INTRAMUSCULAR; INTRAVENOUS; SUBCUTANEOUS at 11:40

## 2019-07-17 RX ADMIN — HEPARIN SODIUM 2000 UNITS: 1000 INJECTION, SOLUTION INTRAVENOUS; SUBCUTANEOUS at 10:57

## 2019-07-17 RX ADMIN — NEBIVOLOL HYDROCHLORIDE 20 MG: 10 TABLET ORAL at 22:05

## 2019-07-17 RX ADMIN — EPHEDRINE SULFATE 10 MG: 50 INJECTION INTRAMUSCULAR; INTRAVENOUS; SUBCUTANEOUS at 08:13

## 2019-07-17 RX ADMIN — PHENYLEPHRINE HYDROCHLORIDE 0.4 MCG/KG/MIN: 10 INJECTION, SOLUTION INTRAMUSCULAR; INTRAVENOUS; SUBCUTANEOUS at 08:16

## 2019-07-17 RX ADMIN — LOSARTAN POTASSIUM: 50 TABLET, FILM COATED ORAL at 22:03

## 2019-07-17 RX ADMIN — MIDAZOLAM 1 MG: 1 INJECTION INTRAMUSCULAR; INTRAVENOUS at 07:20

## 2019-07-17 RX ADMIN — SODIUM CHLORIDE 5 MG/HR: 9 INJECTION, SOLUTION INTRAVENOUS at 20:32

## 2019-07-17 RX ADMIN — SODIUM CHLORIDE 5 MG/HR: 9 INJECTION, SOLUTION INTRAVENOUS at 14:15

## 2019-07-17 RX ADMIN — FENTANYL CITRATE 50 MCG: 50 INJECTION INTRAMUSCULAR; INTRAVENOUS at 14:15

## 2019-07-17 RX ADMIN — SODIUM CHLORIDE, POTASSIUM CHLORIDE, SODIUM LACTATE AND CALCIUM CHLORIDE 9 ML/HR: 600; 310; 30; 20 INJECTION, SOLUTION INTRAVENOUS at 07:12

## 2019-07-17 RX ADMIN — SODIUM CHLORIDE, POTASSIUM CHLORIDE, SODIUM LACTATE AND CALCIUM CHLORIDE 100 ML/HR: 600; 310; 30; 20 INJECTION, SOLUTION INTRAVENOUS at 14:15

## 2019-07-17 RX ADMIN — PHENYLEPHRINE HYDROCHLORIDE 100 MCG: 10 INJECTION INTRAVENOUS at 08:35

## 2019-07-17 RX ADMIN — BRIMONIDINE TARTRATE 1 DROP: 2 SOLUTION OPHTHALMIC at 20:35

## 2019-07-17 RX ADMIN — PHENYLEPHRINE HYDROCHLORIDE 100 MCG: 10 INJECTION INTRAVENOUS at 08:27

## 2019-07-17 RX ADMIN — PHENYLEPHRINE HYDROCHLORIDE 100 MCG: 10 INJECTION INTRAVENOUS at 09:54

## 2019-07-17 RX ADMIN — HEPARIN SODIUM 7000 UNITS: 1000 INJECTION, SOLUTION INTRAVENOUS; SUBCUTANEOUS at 09:47

## 2019-07-17 RX ADMIN — HEPARIN SODIUM 1000 UNITS: 1000 INJECTION, SOLUTION INTRAVENOUS; SUBCUTANEOUS at 09:57

## 2019-07-17 RX ADMIN — DORZOLAMIDE HYDROCHLORIDE 1 DROP: 20 SOLUTION/ DROPS OPHTHALMIC at 20:35

## 2019-07-17 RX ADMIN — LIDOCAINE HYDROCHLORIDE 1 EACH: 40 SOLUTION TOPICAL at 08:08

## 2019-07-17 RX ADMIN — POTASSIUM CHLORIDE AND SODIUM CHLORIDE 100 ML/HR: 900; 150 INJECTION, SOLUTION INTRAVENOUS at 20:33

## 2019-07-17 RX ADMIN — PROTAMINE SULFATE 40 MG: 10 INJECTION, SOLUTION INTRAVENOUS at 11:25

## 2019-07-17 RX ADMIN — ATORVASTATIN CALCIUM 40 MG: 20 TABLET, FILM COATED ORAL at 22:04

## 2019-07-17 RX ADMIN — ROCURONIUM BROMIDE 30 MG: 10 INJECTION INTRAVENOUS at 08:06

## 2019-07-17 RX ADMIN — INSULIN LISPRO 4 UNITS: 100 INJECTION, SOLUTION INTRAVENOUS; SUBCUTANEOUS at 20:33

## 2019-07-17 RX ADMIN — SODIUM CHLORIDE 400 ML: 9 INJECTION, SOLUTION INTRAVENOUS at 17:26

## 2019-07-17 RX ADMIN — PROTAMINE SULFATE 30 MG: 10 INJECTION, SOLUTION INTRAVENOUS at 13:42

## 2019-07-17 RX ADMIN — ROCURONIUM BROMIDE 5 MG: 10 INJECTION INTRAVENOUS at 11:04

## 2019-07-17 RX ADMIN — PHENYLEPHRINE HYDROCHLORIDE 100 MCG: 10 INJECTION INTRAVENOUS at 11:00

## 2019-07-17 RX ADMIN — FENTANYL CITRATE 25 MCG: 50 INJECTION INTRAMUSCULAR; INTRAVENOUS at 09:29

## 2019-07-17 RX ADMIN — FENTANYL CITRATE 25 MCG: 50 INJECTION INTRAMUSCULAR; INTRAVENOUS at 09:21

## 2019-07-17 RX ADMIN — CYANOCOBALAMIN 1000 MCG: 1000 INJECTION, SOLUTION INTRAMUSCULAR at 20:40

## 2019-07-17 RX ADMIN — LIDOCAINE HYDROCHLORIDE 30 MG: 20 INJECTION, SOLUTION INFILTRATION; PERINEURAL at 08:06

## 2019-07-17 RX ADMIN — HYDROMORPHONE HYDROCHLORIDE 0.5 MG: 1 INJECTION, SOLUTION INTRAMUSCULAR; INTRAVENOUS; SUBCUTANEOUS at 20:47

## 2019-07-17 RX ADMIN — HYDRALAZINE HYDROCHLORIDE 50 MG: 50 TABLET, FILM COATED ORAL at 22:04

## 2019-07-17 RX ADMIN — ROCURONIUM BROMIDE 20 MG: 10 INJECTION INTRAVENOUS at 09:19

## 2019-07-17 RX ADMIN — CLOPIDOGREL 75 MG: 75 TABLET, FILM COATED ORAL at 22:04

## 2019-07-17 RX ADMIN — CEFAZOLIN SODIUM 2 G: 2 INJECTION, SOLUTION INTRAVENOUS at 11:22

## 2019-07-17 RX ADMIN — GLYCOPYRROLATE 0.2 MG: 0.2 INJECTION INTRAMUSCULAR; INTRAVENOUS at 08:23

## 2019-07-17 RX ADMIN — DEXAMETHASONE SODIUM PHOSPHATE 8 MG: 10 INJECTION INTRAMUSCULAR; INTRAVENOUS at 08:39

## 2019-07-17 RX ADMIN — VANCOMYCIN HYDROCHLORIDE 1000 MG: 1 INJECTION, SOLUTION INTRAVENOUS at 07:21

## 2019-07-17 RX ADMIN — PROPOFOL 150 MG: 10 INJECTION, EMULSION INTRAVENOUS at 08:06

## 2019-07-17 RX ADMIN — HEPARIN SODIUM 7000 UNITS: 1000 INJECTION, SOLUTION INTRAVENOUS; SUBCUTANEOUS at 13:15

## 2019-07-17 RX ADMIN — SUCCINYLCHOLINE CHLORIDE 100 MG: 20 INJECTION, SOLUTION INTRAMUSCULAR; INTRAVENOUS; PARENTERAL at 12:43

## 2019-07-17 RX ADMIN — GLYCOPYRROLATE 0.4 MG: 0.2 INJECTION INTRAMUSCULAR; INTRAVENOUS at 11:40

## 2019-07-17 RX ADMIN — PHENYLEPHRINE HYDROCHLORIDE 100 MCG: 10 INJECTION INTRAVENOUS at 08:14

## 2019-07-17 RX ADMIN — PROPOFOL 100 MG: 10 INJECTION, EMULSION INTRAVENOUS at 12:43

## 2019-07-17 RX ADMIN — PHENYLEPHRINE HYDROCHLORIDE 100 MCG: 10 INJECTION INTRAVENOUS at 08:20

## 2019-07-17 RX ADMIN — ROCURONIUM BROMIDE 20 MG: 10 INJECTION INTRAVENOUS at 12:53

## 2019-07-17 RX ADMIN — FAMOTIDINE 20 MG: 10 INJECTION INTRAVENOUS at 07:12

## 2019-07-17 RX ADMIN — EPHEDRINE SULFATE 10 MG: 50 INJECTION INTRAMUSCULAR; INTRAVENOUS; SUBCUTANEOUS at 08:34

## 2019-07-17 RX ADMIN — FENTANYL CITRATE 50 MCG: 50 INJECTION INTRAMUSCULAR; INTRAVENOUS at 16:50

## 2019-07-17 NOTE — ANESTHESIA POSTPROCEDURE EVALUATION
Patient: Darby Workman    Procedure Summary     Date:  07/17/19 Room / Location:  [facility] OR 21 / [facility] MAIN OR; [facility]    Anesthesia Start:  0754 Anesthesia Stop:  1419    Procedures:       Left carotid endarterectomy (Left Neck)      CEREBRAL ANGIOGRAM, LEFT INTERNAL CAROTID ARTERY STENT (Left ) Diagnosis:       Cerebral infarction due to stenosis of left carotid artery (CMS/HCC)      (Cerebral infarction due to stenosis of left carotid artery (CMS/HCC) [I63.232])    Surgeon:  Rupert Oliva MD Provider:  Hong Gaspar MD    Anesthesia Type:  general ASA Status:  3          Anesthesia Type: general  Last vitals  BP   131/63 (07/17/19 1535)   Temp   36.6 °C (97.8 °F) (07/17/19 1408)   Pulse   69 (07/17/19 1535)   Resp   14 (07/17/19 1535)     SpO2   97 % (07/17/19 1535)     Post Anesthesia Care and Evaluation    Patient location during evaluation: PACU  Patient participation: complete - patient participated  Level of consciousness: responsive to verbal stimuli  Pain management: adequate  Airway patency: patent  Anesthetic complications: No anesthetic complications  PONV Status: none  Cardiovascular status: acceptable  Respiratory status: acceptable, intubated and ventilator  Hydration status: acceptable

## 2019-07-17 NOTE — ANESTHESIA PROCEDURE NOTES
Arterial Line    Pre-sedation assessment completed: 7/17/2019 7:18 AM    Patient reassessed immediately prior to procedure    Patient location during procedure: holding area  Start time: 7/17/2019 7:18 AM  Stop Time:7/17/2019 7:22 AM       Line placed for hemodynamic monitoring, ABGs/Labs/ISTAT and MD/Surgeon request.  Performed By   Anesthesiologist: Hong Gaspar MD  Preanesthetic Checklist  Completed: patient identified, site marked, surgical consent, pre-op evaluation, timeout performed, IV checked, risks and benefits discussed and monitors and equipment checked  Arterial Line Prep   Sterile Tech: cap, gloves and mask  Prep: ChloraPrep  Patient monitoring: blood pressure monitoring, continuous pulse oximetry and EKG  Arterial Line Procedure   Laterality:right  Location:  radial artery  Catheter size: 20 G   Guidance: ultrasound guided  PROCEDURE NOTE/ULTRASOUND INTERPRETATION.  Using ultrasound guidance the potential vascular sites for insertion of the catheter were visualized to determine the patency of the vessel to be used for vascular access.  After selecting the appropriate site for insertion, the needle was visualized under ultrasound being inserted into the radial artery, followed by ultrasound confirmation of wire and catheter placement. There were no abnormalities seen on ultrasound; an image was taken; and the patient tolerated the procedure with no complications.   Number of attempts: 1  Successful placement: yes  Post Assessment   Dressing Type: occlusive dressing applied, secured with tape and wrist guard applied.   Complications no  Circ/Move/Sens Assessment: normal and unchanged.   Patient Tolerance: patient tolerated the procedure well with no apparent complications

## 2019-07-17 NOTE — ANESTHESIA PREPROCEDURE EVALUATION
Anesthesia Evaluation     Patient summary reviewed and Nursing notes reviewed   no history of anesthetic complications:  NPO Solid Status: > 8 hours  NPO Liquid Status: > 2 hours           Airway   Mallampati: II  TM distance: >3 FB  Neck ROM: full  no difficulty expected  Dental - normal exam     Pulmonary - negative pulmonary ROS and normal exam   (-) COPD, asthma, not a smoker, lung cancer  Cardiovascular - normal exam  Exercise tolerance: good (4-7 METS)    ECG reviewed  PT is on anticoagulation therapy  Rhythm: regular  Rate: normal    (+) hypertension poorly controlled 2 medications or greater, past MI  >12 months, CAD, PVD, hyperlipidemia,  carotid artery disease left carotid  (-) dysrhythmias, angina, CHF, cardiac stents, pericardial effusion    ROS comment: ECHO 05/2019:  ·Estimated EF = 60%.  ·Left ventricular systolic function is normal.  ·Trace-to-mild aortic valve regurgitation is present.  ·Mild tricuspid valve regurgitation is present.  ·Calculated right ventricular systolic pressure from tricuspid regurgitation is 39 mmHg    Neuro/Psych  (+) CVA residual symptoms,     (-) seizures, TIA  GI/Hepatic/Renal/Endo    (+)   diabetes mellitus type 2 well controlled,   (-) hiatal hernia, GERD, PUD, hepatitis, liver disease, no renal disease, hypothyroidism, GI bleed    Musculoskeletal     Abdominal  - normal exam   Substance History - negative use     OB/GYN          Other   (+) arthritis                   Anesthesia Plan    ASA 3     general   (GA w/ A line)  intravenous induction   Anesthetic plan, all risks, benefits, and alternatives have been provided, discussed and informed consent has been obtained with: patient.    Plan discussed with CRNA and attending.

## 2019-07-17 NOTE — ANESTHESIA PROCEDURE NOTES
Airway  Urgency: elective    Airway not difficult    General Information and Staff    Patient location during procedure: OR  Anesthesiologist: Hong Gaspar MD  CRNA: Sheryl Mcgowan CRNA    Indications and Patient Condition  Indications for airway management: airway protection    Preoxygenated: yes  Mask difficulty assessment: 1 - vent by mask    Final Airway Details  Final airway type: endotracheal airway      Successful airway: ETT  Cuffed: yes   Successful intubation technique: direct laryngoscopy  Facilitating devices/methods: intubating stylet  Endotracheal tube insertion site: oral  Blade: Jain  Blade size: 2  ETT size (mm): 7.0  Cormack-Lehane Classification: grade I - full view of glottis  Placement verified by: chest auscultation and capnometry   Cuff volume (mL): 7  Measured from: teeth  ETT to teeth (cm): 20  Number of attempts at approach: 1

## 2019-07-17 NOTE — ANESTHESIA PROCEDURE NOTES
Airway  Urgency: emergent    Airway not difficult    General Information and Staff    Patient location during procedure: OR  Anesthesiologist: Hong Gaspar MD  CRNA: Sheryl Mcgowan CRNA    Consent for Airway (if performed for an anesthetic, see related documentation for consents)  Patient identity confirmed: anonymous protocol, patient vented/unresponsive  Consent: The procedure was performed in an emergent situation. Verbal consent not obtained. Written consent not obtained.  Risks and benefits: risks, benefits and alternatives were not discussed      Indications and Patient Condition  Indications for airway management: airway protection    Preoxygenated: yes  Mask difficulty assessment: 1 - vent by mask    Final Airway Details  Final airway type: endotracheal airway      Successful airway: ETT  Cuffed: yes   Successful intubation technique: direct laryngoscopy  Facilitating devices/methods: intubating stylet  Endotracheal tube insertion site: oral  Blade: Jain  Blade size: 2  ETT size (mm): 7.0  Cormack-Lehane Classification: grade I - full view of glottis  Placement verified by: chest auscultation and capnometry   Cuff volume (mL): 7  Measured from: teeth  ETT to teeth (cm): 21  Number of attempts at approach: 1

## 2019-07-18 LAB
ANION GAP SERPL CALCULATED.3IONS-SCNC: 8.9 MMOL/L (ref 5–15)
ARTERIAL PATENCY WRIST A: ABNORMAL
ATMOSPHERIC PRESS: 752 MMHG
BASE EXCESS BLDA CALC-SCNC: -2 MMOL/L (ref 0–2)
BASOPHILS # BLD AUTO: 0.03 10*3/MM3 (ref 0–0.2)
BASOPHILS NFR BLD AUTO: 0.3 % (ref 0–1.5)
BDY SITE: ABNORMAL
BUN BLD-MCNC: 18 MG/DL (ref 8–23)
BUN/CREAT SERPL: 22 (ref 7–25)
CALCIUM SPEC-SCNC: 7.9 MG/DL (ref 8.6–10.5)
CHLORIDE SERPL-SCNC: 106 MMOL/L (ref 98–107)
CO2 SERPL-SCNC: 21.1 MMOL/L (ref 22–29)
CREAT BLD-MCNC: 0.82 MG/DL (ref 0.57–1)
DEPRECATED RDW RBC AUTO: 52.3 FL (ref 37–54)
EOSINOPHIL # BLD AUTO: 0 10*3/MM3 (ref 0–0.4)
EOSINOPHIL NFR BLD AUTO: 0 % (ref 0.3–6.2)
ERYTHROCYTE [DISTWIDTH] IN BLOOD BY AUTOMATED COUNT: 16.7 % (ref 12.3–15.4)
GFR SERPL CREATININE-BSD FRML MDRD: 68 ML/MIN/1.73
GLUCOSE BLD-MCNC: 134 MG/DL (ref 65–99)
GLUCOSE BLDC GLUCOMTR-MCNC: 133 MG/DL (ref 70–130)
GLUCOSE BLDC GLUCOMTR-MCNC: 147 MG/DL (ref 70–130)
GLUCOSE BLDC GLUCOMTR-MCNC: 148 MG/DL (ref 70–130)
GLUCOSE BLDC GLUCOMTR-MCNC: 148 MG/DL (ref 70–130)
GLUCOSE BLDC GLUCOMTR-MCNC: 150 MG/DL (ref 70–130)
HCO3 BLDA-SCNC: 21.9 MMOL/L (ref 22–28)
HCT VFR BLD AUTO: 26.2 % (ref 34–46.6)
HGB BLD-MCNC: 8.5 G/DL (ref 12–15.9)
HOROWITZ INDEX BLD+IHG-RTO: 30 %
IMM GRANULOCYTES # BLD AUTO: 0.07 10*3/MM3 (ref 0–0.05)
IMM GRANULOCYTES NFR BLD AUTO: 0.6 % (ref 0–0.5)
LYMPHOCYTES # BLD AUTO: 0.83 10*3/MM3 (ref 0.7–3.1)
LYMPHOCYTES NFR BLD AUTO: 7 % (ref 19.6–45.3)
MCH RBC QN AUTO: 28.1 PG (ref 26.6–33)
MCHC RBC AUTO-ENTMCNC: 32.4 G/DL (ref 31.5–35.7)
MCV RBC AUTO: 86.5 FL (ref 79–97)
MODALITY: ABNORMAL
MONOCYTES # BLD AUTO: 0.97 10*3/MM3 (ref 0.1–0.9)
MONOCYTES NFR BLD AUTO: 8.2 % (ref 5–12)
NEUTROPHILS # BLD AUTO: 9.97 10*3/MM3 (ref 1.7–7)
NEUTROPHILS NFR BLD AUTO: 83.9 % (ref 42.7–76)
NRBC BLD AUTO-RTO: 0 /100 WBC (ref 0–0.2)
O2 A-A PPRESDIFF RESPIRATORY: 0.5 MMHG
PCO2 BLDA: 33.1 MM HG (ref 35–45)
PEEP RESPIRATORY: 5 CM[H2O]
PH BLDA: 7.43 PH UNITS (ref 7.35–7.45)
PLATELET # BLD AUTO: 231 10*3/MM3 (ref 140–450)
PMV BLD AUTO: 10.2 FL (ref 6–12)
PO2 BLDA: 96.9 MM HG (ref 80–100)
POTASSIUM BLD-SCNC: 3.9 MMOL/L (ref 3.5–5.2)
PSV: 10 CMH2O
RBC # BLD AUTO: 3.03 10*6/MM3 (ref 3.77–5.28)
SAO2 % BLDCOA: 97.8 % (ref 92–99)
SODIUM BLD-SCNC: 136 MMOL/L (ref 136–145)
TOTAL RATE: 23 BREATHS/MINUTE
VENTILATOR MODE: ABNORMAL
VT ON VENT VENT: 474 ML
WBC NRBC COR # BLD: 11.87 10*3/MM3 (ref 3.4–10.8)

## 2019-07-18 PROCEDURE — 94799 UNLISTED PULMONARY SVC/PX: CPT

## 2019-07-18 PROCEDURE — 36600 WITHDRAWAL OF ARTERIAL BLOOD: CPT

## 2019-07-18 PROCEDURE — 85025 COMPLETE CBC W/AUTO DIFF WBC: CPT | Performed by: NEUROLOGICAL SURGERY

## 2019-07-18 PROCEDURE — 82803 BLOOD GASES ANY COMBINATION: CPT

## 2019-07-18 PROCEDURE — 99024 POSTOP FOLLOW-UP VISIT: CPT | Performed by: NEUROLOGICAL SURGERY

## 2019-07-18 PROCEDURE — 25010000002 HYDROMORPHONE PER 4 MG: Performed by: NEUROLOGICAL SURGERY

## 2019-07-18 PROCEDURE — 80048 BASIC METABOLIC PNL TOTAL CA: CPT | Performed by: NEUROLOGICAL SURGERY

## 2019-07-18 PROCEDURE — 25810000003 SODIUM CHLORIDE 0.9 % WITH KCL 20 MEQ 20-0.9 MEQ/L-% SOLUTION: Performed by: NEUROLOGICAL SURGERY

## 2019-07-18 PROCEDURE — 82962 GLUCOSE BLOOD TEST: CPT

## 2019-07-18 PROCEDURE — 94003 VENT MGMT INPAT SUBQ DAY: CPT

## 2019-07-18 RX ORDER — ATORVASTATIN CALCIUM 80 MG/1
80 TABLET, FILM COATED ORAL NIGHTLY
Status: DISCONTINUED | OUTPATIENT
Start: 2019-07-18 | End: 2019-07-31 | Stop reason: HOSPADM

## 2019-07-18 RX ORDER — ATORVASTATIN CALCIUM 20 MG/1
20 TABLET, FILM COATED ORAL NIGHTLY
Status: ON HOLD | COMMUNITY
End: 2019-07-22

## 2019-07-18 RX ORDER — ASPIRIN 81 MG/1
81 TABLET ORAL DAILY
Status: DISCONTINUED | OUTPATIENT
Start: 2019-07-18 | End: 2019-07-19

## 2019-07-18 RX ORDER — PIOGLITAZONEHYDROCHLORIDE 45 MG/1
45 TABLET ORAL DAILY
Status: ON HOLD | COMMUNITY
End: 2019-07-22

## 2019-07-18 RX ADMIN — ASPIRIN 81 MG: 325 TABLET, COATED ORAL at 09:02

## 2019-07-18 RX ADMIN — BRIMONIDINE TARTRATE 1 DROP: 2 SOLUTION OPHTHALMIC at 20:27

## 2019-07-18 RX ADMIN — DORZOLAMIDE HYDROCHLORIDE 1 DROP: 20 SOLUTION/ DROPS OPHTHALMIC at 09:03

## 2019-07-18 RX ADMIN — HYDROMORPHONE HYDROCHLORIDE 0.5 MG: 1 INJECTION, SOLUTION INTRAMUSCULAR; INTRAVENOUS; SUBCUTANEOUS at 23:09

## 2019-07-18 RX ADMIN — BRIMONIDINE TARTRATE 1 DROP: 2 SOLUTION OPHTHALMIC at 09:02

## 2019-07-18 RX ADMIN — HYDRALAZINE HYDROCHLORIDE 50 MG: 50 TABLET, FILM COATED ORAL at 06:03

## 2019-07-18 RX ADMIN — POTASSIUM CHLORIDE AND SODIUM CHLORIDE 100 ML/HR: 900; 150 INJECTION, SOLUTION INTRAVENOUS at 04:46

## 2019-07-18 RX ADMIN — GLIPIZIDE 10 MG: 10 TABLET ORAL at 09:02

## 2019-07-18 RX ADMIN — LOSARTAN POTASSIUM: 50 TABLET, FILM COATED ORAL at 09:01

## 2019-07-18 RX ADMIN — AMLODIPINE BESYLATE 10 MG: 10 TABLET ORAL at 09:02

## 2019-07-18 RX ADMIN — DORZOLAMIDE HYDROCHLORIDE 1 DROP: 20 SOLUTION/ DROPS OPHTHALMIC at 20:27

## 2019-07-18 RX ADMIN — HYDRALAZINE HYDROCHLORIDE 50 MG: 50 TABLET, FILM COATED ORAL at 13:19

## 2019-07-18 RX ADMIN — POTASSIUM CHLORIDE AND SODIUM CHLORIDE 100 ML/HR: 900; 150 INJECTION, SOLUTION INTRAVENOUS at 23:51

## 2019-07-18 RX ADMIN — CLOPIDOGREL 75 MG: 75 TABLET, FILM COATED ORAL at 09:02

## 2019-07-18 RX ADMIN — NEBIVOLOL HYDROCHLORIDE 20 MG: 10 TABLET ORAL at 09:02

## 2019-07-18 RX ADMIN — POTASSIUM CHLORIDE AND SODIUM CHLORIDE 100 ML/HR: 900; 150 INJECTION, SOLUTION INTRAVENOUS at 14:52

## 2019-07-19 ENCOUNTER — APPOINTMENT (OUTPATIENT)
Dept: CT IMAGING | Facility: HOSPITAL | Age: 74
End: 2019-07-19

## 2019-07-19 LAB
ANION GAP SERPL CALCULATED.3IONS-SCNC: 8.2 MMOL/L (ref 5–15)
ASA PLATELET INHIBITION: 523 ARU
BASOPHILS # BLD AUTO: 0.07 10*3/MM3 (ref 0–0.2)
BASOPHILS NFR BLD AUTO: 0.7 % (ref 0–1.5)
BUN BLD-MCNC: 10 MG/DL (ref 8–23)
BUN/CREAT SERPL: 15.4 (ref 7–25)
CALCIUM SPEC-SCNC: 8.3 MG/DL (ref 8.6–10.5)
CHLORIDE SERPL-SCNC: 107 MMOL/L (ref 98–107)
CO2 SERPL-SCNC: 21.8 MMOL/L (ref 22–29)
CREAT BLD-MCNC: 0.65 MG/DL (ref 0.57–1)
DEPRECATED RDW RBC AUTO: 53.7 FL (ref 37–54)
EOSINOPHIL # BLD AUTO: 0.04 10*3/MM3 (ref 0–0.4)
EOSINOPHIL NFR BLD AUTO: 0.4 % (ref 0.3–6.2)
ERYTHROCYTE [DISTWIDTH] IN BLOOD BY AUTOMATED COUNT: 17 % (ref 12.3–15.4)
GFR SERPL CREATININE-BSD FRML MDRD: 89 ML/MIN/1.73
GLUCOSE BLD-MCNC: 121 MG/DL (ref 65–99)
GLUCOSE BLDC GLUCOMTR-MCNC: 115 MG/DL (ref 70–130)
GLUCOSE BLDC GLUCOMTR-MCNC: 117 MG/DL (ref 70–130)
GLUCOSE BLDC GLUCOMTR-MCNC: 120 MG/DL (ref 70–130)
GLUCOSE BLDC GLUCOMTR-MCNC: 121 MG/DL (ref 70–130)
GLUCOSE BLDC GLUCOMTR-MCNC: 122 MG/DL (ref 70–130)
HCT VFR BLD AUTO: 29.9 % (ref 34–46.6)
HGB BLD-MCNC: 9.7 G/DL (ref 12–15.9)
IMM GRANULOCYTES # BLD AUTO: 0.06 10*3/MM3 (ref 0–0.05)
IMM GRANULOCYTES NFR BLD AUTO: 0.6 % (ref 0–0.5)
LYMPHOCYTES # BLD AUTO: 0.8 10*3/MM3 (ref 0.7–3.1)
LYMPHOCYTES NFR BLD AUTO: 8.1 % (ref 19.6–45.3)
MCH RBC QN AUTO: 28.4 PG (ref 26.6–33)
MCHC RBC AUTO-ENTMCNC: 32.4 G/DL (ref 31.5–35.7)
MCV RBC AUTO: 87.4 FL (ref 79–97)
MONOCYTES # BLD AUTO: 0.85 10*3/MM3 (ref 0.1–0.9)
MONOCYTES NFR BLD AUTO: 8.6 % (ref 5–12)
NEUTROPHILS # BLD AUTO: 8.1 10*3/MM3 (ref 1.7–7)
NEUTROPHILS NFR BLD AUTO: 81.6 % (ref 42.7–76)
NRBC BLD AUTO-RTO: 0 /100 WBC (ref 0–0.2)
PLATELET # BLD AUTO: 258 10*3/MM3 (ref 140–450)
PMV BLD AUTO: 9.4 FL (ref 6–12)
POTASSIUM BLD-SCNC: 4 MMOL/L (ref 3.5–5.2)
RBC # BLD AUTO: 3.42 10*6/MM3 (ref 3.77–5.28)
SODIUM BLD-SCNC: 137 MMOL/L (ref 136–145)
WBC NRBC COR # BLD: 9.92 10*3/MM3 (ref 3.4–10.8)

## 2019-07-19 PROCEDURE — 51701 INSERT BLADDER CATHETER: CPT

## 2019-07-19 PROCEDURE — 99024 POSTOP FOLLOW-UP VISIT: CPT | Performed by: NEUROLOGICAL SURGERY

## 2019-07-19 PROCEDURE — 80048 BASIC METABOLIC PNL TOTAL CA: CPT | Performed by: INTERNAL MEDICINE

## 2019-07-19 PROCEDURE — 51798 US URINE CAPACITY MEASURE: CPT

## 2019-07-19 PROCEDURE — 25810000003 SODIUM CHLORIDE 0.9 % WITH KCL 20 MEQ 20-0.9 MEQ/L-% SOLUTION: Performed by: NEUROLOGICAL SURGERY

## 2019-07-19 PROCEDURE — 82962 GLUCOSE BLOOD TEST: CPT

## 2019-07-19 PROCEDURE — 85576 BLOOD PLATELET AGGREGATION: CPT | Performed by: NURSE PRACTITIONER

## 2019-07-19 PROCEDURE — 85025 COMPLETE CBC W/AUTO DIFF WBC: CPT | Performed by: INTERNAL MEDICINE

## 2019-07-19 PROCEDURE — 70450 CT HEAD/BRAIN W/O DYE: CPT

## 2019-07-19 RX ORDER — ASPIRIN 325 MG
325 TABLET, DELAYED RELEASE (ENTERIC COATED) ORAL DAILY
Status: DISCONTINUED | OUTPATIENT
Start: 2019-07-19 | End: 2019-07-20

## 2019-07-19 RX ADMIN — ASPIRIN 325 MG: 325 TABLET, COATED ORAL at 13:49

## 2019-07-19 RX ADMIN — BRIMONIDINE TARTRATE 1 DROP: 2 SOLUTION OPHTHALMIC at 20:17

## 2019-07-19 RX ADMIN — POTASSIUM CHLORIDE AND SODIUM CHLORIDE 100 ML/HR: 900; 150 INJECTION, SOLUTION INTRAVENOUS at 10:32

## 2019-07-19 RX ADMIN — ATORVASTATIN CALCIUM 80 MG: 80 TABLET, FILM COATED ORAL at 20:17

## 2019-07-19 RX ADMIN — ASPIRIN 81 MG: 325 TABLET, COATED ORAL at 11:16

## 2019-07-19 RX ADMIN — HYDRALAZINE HYDROCHLORIDE 50 MG: 50 TABLET, FILM COATED ORAL at 13:56

## 2019-07-19 RX ADMIN — DORZOLAMIDE HYDROCHLORIDE 1 DROP: 20 SOLUTION/ DROPS OPHTHALMIC at 20:17

## 2019-07-19 RX ADMIN — BRIMONIDINE TARTRATE 1 DROP: 2 SOLUTION OPHTHALMIC at 09:34

## 2019-07-19 RX ADMIN — HYDRALAZINE HYDROCHLORIDE 50 MG: 50 TABLET, FILM COATED ORAL at 22:26

## 2019-07-19 RX ADMIN — SODIUM CHLORIDE, PRESERVATIVE FREE 10 ML: 5 INJECTION INTRAVENOUS at 20:21

## 2019-07-19 RX ADMIN — DORZOLAMIDE HYDROCHLORIDE 1 DROP: 20 SOLUTION/ DROPS OPHTHALMIC at 09:34

## 2019-07-19 RX ADMIN — LOSARTAN POTASSIUM: 50 TABLET, FILM COATED ORAL at 11:17

## 2019-07-19 RX ADMIN — AMLODIPINE BESYLATE 10 MG: 10 TABLET ORAL at 11:18

## 2019-07-19 RX ADMIN — NEBIVOLOL HYDROCHLORIDE 20 MG: 10 TABLET ORAL at 11:16

## 2019-07-19 RX ADMIN — GLIPIZIDE 10 MG: 10 TABLET ORAL at 11:16

## 2019-07-19 RX ADMIN — CLOPIDOGREL 75 MG: 75 TABLET, FILM COATED ORAL at 11:16

## 2019-07-20 ENCOUNTER — APPOINTMENT (OUTPATIENT)
Dept: GENERAL RADIOLOGY | Facility: HOSPITAL | Age: 74
End: 2019-07-20

## 2019-07-20 LAB
GLUCOSE BLDC GLUCOMTR-MCNC: 131 MG/DL (ref 70–130)
GLUCOSE BLDC GLUCOMTR-MCNC: 140 MG/DL (ref 70–130)
GLUCOSE BLDC GLUCOMTR-MCNC: 154 MG/DL (ref 70–130)
GLUCOSE BLDC GLUCOMTR-MCNC: 159 MG/DL (ref 70–130)
GLUCOSE BLDC GLUCOMTR-MCNC: 160 MG/DL (ref 70–130)
GLUCOSE BLDC GLUCOMTR-MCNC: 167 MG/DL (ref 70–130)
GLUCOSE BLDC GLUCOMTR-MCNC: 220 MG/DL (ref 70–130)
PROCALCITONIN SERPL-MCNC: 0.1 NG/ML (ref 0.1–0.25)

## 2019-07-20 PROCEDURE — 97162 PT EVAL MOD COMPLEX 30 MIN: CPT | Performed by: PHYSICAL THERAPIST

## 2019-07-20 PROCEDURE — 71045 X-RAY EXAM CHEST 1 VIEW: CPT

## 2019-07-20 PROCEDURE — 74018 RADEX ABDOMEN 1 VIEW: CPT

## 2019-07-20 PROCEDURE — 82962 GLUCOSE BLOOD TEST: CPT

## 2019-07-20 PROCEDURE — 63710000001 INSULIN LISPRO (HUMAN) PER 5 UNITS: Performed by: INTERNAL MEDICINE

## 2019-07-20 PROCEDURE — 84145 PROCALCITONIN (PCT): CPT | Performed by: INTERNAL MEDICINE

## 2019-07-20 PROCEDURE — 99024 POSTOP FOLLOW-UP VISIT: CPT | Performed by: NEUROLOGICAL SURGERY

## 2019-07-20 PROCEDURE — 97530 THERAPEUTIC ACTIVITIES: CPT | Performed by: PHYSICAL THERAPIST

## 2019-07-20 RX ORDER — ASPIRIN 325 MG
325 TABLET ORAL DAILY
Status: DISCONTINUED | OUTPATIENT
Start: 2019-07-20 | End: 2019-07-21

## 2019-07-20 RX ADMIN — INSULIN LISPRO 2 UNITS: 100 INJECTION, SOLUTION INTRAVENOUS; SUBCUTANEOUS at 12:32

## 2019-07-20 RX ADMIN — GLIPIZIDE 10 MG: 10 TABLET ORAL at 06:51

## 2019-07-20 RX ADMIN — BRIMONIDINE TARTRATE 1 DROP: 2 SOLUTION OPHTHALMIC at 20:41

## 2019-07-20 RX ADMIN — DORZOLAMIDE HYDROCHLORIDE 1 DROP: 20 SOLUTION/ DROPS OPHTHALMIC at 09:31

## 2019-07-20 RX ADMIN — ATORVASTATIN CALCIUM 80 MG: 80 TABLET, FILM COATED ORAL at 20:39

## 2019-07-20 RX ADMIN — NEBIVOLOL HYDROCHLORIDE 20 MG: 10 TABLET ORAL at 09:29

## 2019-07-20 RX ADMIN — SODIUM CHLORIDE, PRESERVATIVE FREE 10 ML: 5 INJECTION INTRAVENOUS at 20:41

## 2019-07-20 RX ADMIN — HYDRALAZINE HYDROCHLORIDE 50 MG: 50 TABLET, FILM COATED ORAL at 06:51

## 2019-07-20 RX ADMIN — INSULIN LISPRO 4 UNITS: 100 INJECTION, SOLUTION INTRAVENOUS; SUBCUTANEOUS at 20:42

## 2019-07-20 RX ADMIN — BRIMONIDINE TARTRATE 1 DROP: 2 SOLUTION OPHTHALMIC at 09:31

## 2019-07-20 RX ADMIN — GLIPIZIDE 10 MG: 10 TABLET ORAL at 18:13

## 2019-07-20 RX ADMIN — SODIUM CHLORIDE, PRESERVATIVE FREE 3 ML: 5 INJECTION INTRAVENOUS at 09:31

## 2019-07-20 RX ADMIN — HYDROCODONE BITARTRATE AND ACETAMINOPHEN 1 TABLET: 5; 325 TABLET ORAL at 20:39

## 2019-07-20 RX ADMIN — HYDRALAZINE HYDROCHLORIDE 50 MG: 50 TABLET, FILM COATED ORAL at 14:56

## 2019-07-20 RX ADMIN — CLOPIDOGREL 75 MG: 75 TABLET, FILM COATED ORAL at 09:29

## 2019-07-20 RX ADMIN — DORZOLAMIDE HYDROCHLORIDE 1 DROP: 20 SOLUTION/ DROPS OPHTHALMIC at 20:41

## 2019-07-20 RX ADMIN — AMLODIPINE BESYLATE 10 MG: 10 TABLET ORAL at 09:29

## 2019-07-20 RX ADMIN — HYDRALAZINE HYDROCHLORIDE 50 MG: 50 TABLET, FILM COATED ORAL at 22:21

## 2019-07-20 RX ADMIN — LOSARTAN POTASSIUM: 50 TABLET, FILM COATED ORAL at 09:29

## 2019-07-20 RX ADMIN — ASPIRIN 325 MG: 325 TABLET ORAL at 12:35

## 2019-07-20 RX ADMIN — INSULIN LISPRO 2 UNITS: 100 INJECTION, SOLUTION INTRAVENOUS; SUBCUTANEOUS at 09:30

## 2019-07-21 ENCOUNTER — APPOINTMENT (OUTPATIENT)
Dept: CT IMAGING | Facility: HOSPITAL | Age: 74
End: 2019-07-21

## 2019-07-21 LAB
GLUCOSE BLDC GLUCOMTR-MCNC: 118 MG/DL (ref 70–130)
GLUCOSE BLDC GLUCOMTR-MCNC: 152 MG/DL (ref 70–130)
GLUCOSE BLDC GLUCOMTR-MCNC: 154 MG/DL (ref 70–130)
GLUCOSE BLDC GLUCOMTR-MCNC: 171 MG/DL (ref 70–130)
GLUCOSE BLDC GLUCOMTR-MCNC: 197 MG/DL (ref 70–130)

## 2019-07-21 PROCEDURE — 25010000002 LORAZEPAM PER 2 MG: Performed by: INTERNAL MEDICINE

## 2019-07-21 PROCEDURE — 63710000001 INSULIN GLARGINE PER 5 UNITS: Performed by: INTERNAL MEDICINE

## 2019-07-21 PROCEDURE — 82962 GLUCOSE BLOOD TEST: CPT

## 2019-07-21 PROCEDURE — 97110 THERAPEUTIC EXERCISES: CPT | Performed by: PHYSICAL THERAPIST

## 2019-07-21 PROCEDURE — 70450 CT HEAD/BRAIN W/O DYE: CPT

## 2019-07-21 PROCEDURE — 63710000001 INSULIN LISPRO (HUMAN) PER 5 UNITS: Performed by: INTERNAL MEDICINE

## 2019-07-21 PROCEDURE — 99024 POSTOP FOLLOW-UP VISIT: CPT | Performed by: NEUROLOGICAL SURGERY

## 2019-07-21 RX ORDER — ASPIRIN 325 MG
325 TABLET, DELAYED RELEASE (ENTERIC COATED) ORAL DAILY
Status: DISCONTINUED | OUTPATIENT
Start: 2019-07-21 | End: 2019-07-21

## 2019-07-21 RX ORDER — INSULIN GLARGINE 100 [IU]/ML
10 INJECTION, SOLUTION SUBCUTANEOUS NIGHTLY
Status: DISCONTINUED | OUTPATIENT
Start: 2019-07-21 | End: 2019-07-31 | Stop reason: HOSPADM

## 2019-07-21 RX ORDER — LORAZEPAM 2 MG/ML
1 INJECTION INTRAMUSCULAR ONCE
Status: COMPLETED | OUTPATIENT
Start: 2019-07-21 | End: 2019-07-21

## 2019-07-21 RX ORDER — ASPIRIN 325 MG
325 TABLET ORAL DAILY
Status: DISCONTINUED | OUTPATIENT
Start: 2019-07-22 | End: 2019-07-31 | Stop reason: HOSPADM

## 2019-07-21 RX ADMIN — SODIUM CHLORIDE, PRESERVATIVE FREE 3 ML: 5 INJECTION INTRAVENOUS at 08:15

## 2019-07-21 RX ADMIN — GLIPIZIDE 10 MG: 10 TABLET ORAL at 06:47

## 2019-07-21 RX ADMIN — APIXABAN 5 MG: 5 TABLET, FILM COATED ORAL at 20:49

## 2019-07-21 RX ADMIN — LORAZEPAM 1 MG: 2 INJECTION INTRAMUSCULAR; INTRAVENOUS at 03:54

## 2019-07-21 RX ADMIN — BRIMONIDINE TARTRATE 1 DROP: 2 SOLUTION OPHTHALMIC at 08:14

## 2019-07-21 RX ADMIN — DORZOLAMIDE HYDROCHLORIDE 1 DROP: 20 SOLUTION/ DROPS OPHTHALMIC at 08:14

## 2019-07-21 RX ADMIN — CLOPIDOGREL 75 MG: 75 TABLET, FILM COATED ORAL at 08:15

## 2019-07-21 RX ADMIN — SODIUM CHLORIDE, PRESERVATIVE FREE 10 ML: 5 INJECTION INTRAVENOUS at 20:52

## 2019-07-21 RX ADMIN — ATORVASTATIN CALCIUM 80 MG: 80 TABLET, FILM COATED ORAL at 20:49

## 2019-07-21 RX ADMIN — HYDRALAZINE HYDROCHLORIDE 50 MG: 50 TABLET, FILM COATED ORAL at 22:00

## 2019-07-21 RX ADMIN — INSULIN LISPRO 4 UNITS: 100 INJECTION, SOLUTION INTRAVENOUS; SUBCUTANEOUS at 04:27

## 2019-07-21 RX ADMIN — BRIMONIDINE TARTRATE 1 DROP: 2 SOLUTION OPHTHALMIC at 20:51

## 2019-07-21 RX ADMIN — INSULIN LISPRO 2 UNITS: 100 INJECTION, SOLUTION INTRAVENOUS; SUBCUTANEOUS at 08:15

## 2019-07-21 RX ADMIN — APIXABAN 5 MG: 5 TABLET, FILM COATED ORAL at 11:22

## 2019-07-21 RX ADMIN — HYDRALAZINE HYDROCHLORIDE 50 MG: 50 TABLET, FILM COATED ORAL at 14:57

## 2019-07-21 RX ADMIN — NEBIVOLOL HYDROCHLORIDE 20 MG: 10 TABLET ORAL at 08:15

## 2019-07-21 RX ADMIN — GLIPIZIDE 10 MG: 10 TABLET ORAL at 16:41

## 2019-07-21 RX ADMIN — AMLODIPINE BESYLATE 10 MG: 10 TABLET ORAL at 08:15

## 2019-07-21 RX ADMIN — LOSARTAN POTASSIUM: 50 TABLET, FILM COATED ORAL at 08:15

## 2019-07-21 RX ADMIN — INSULIN LISPRO 2 UNITS: 100 INJECTION, SOLUTION INTRAVENOUS; SUBCUTANEOUS at 20:48

## 2019-07-21 RX ADMIN — INSULIN LISPRO 2 UNITS: 100 INJECTION, SOLUTION INTRAVENOUS; SUBCUTANEOUS at 12:21

## 2019-07-21 RX ADMIN — DORZOLAMIDE HYDROCHLORIDE 1 DROP: 20 SOLUTION/ DROPS OPHTHALMIC at 20:51

## 2019-07-21 RX ADMIN — INSULIN GLARGINE 10 UNITS: 100 INJECTION, SOLUTION SUBCUTANEOUS at 20:48

## 2019-07-21 RX ADMIN — ASPIRIN 325 MG: 325 TABLET ORAL at 08:15

## 2019-07-22 ENCOUNTER — APPOINTMENT (OUTPATIENT)
Dept: CT IMAGING | Facility: HOSPITAL | Age: 74
End: 2019-07-22

## 2019-07-22 LAB
GLUCOSE BLDC GLUCOMTR-MCNC: 110 MG/DL (ref 70–130)
GLUCOSE BLDC GLUCOMTR-MCNC: 137 MG/DL (ref 70–130)
GLUCOSE BLDC GLUCOMTR-MCNC: 156 MG/DL (ref 70–130)
GLUCOSE BLDC GLUCOMTR-MCNC: 168 MG/DL (ref 70–130)
GLUCOSE BLDC GLUCOMTR-MCNC: 196 MG/DL (ref 70–130)
GLUCOSE BLDC GLUCOMTR-MCNC: 203 MG/DL (ref 70–130)

## 2019-07-22 PROCEDURE — 97165 OT EVAL LOW COMPLEX 30 MIN: CPT

## 2019-07-22 PROCEDURE — 82962 GLUCOSE BLOOD TEST: CPT

## 2019-07-22 PROCEDURE — 99024 POSTOP FOLLOW-UP VISIT: CPT | Performed by: NURSE PRACTITIONER

## 2019-07-22 PROCEDURE — 97110 THERAPEUTIC EXERCISES: CPT

## 2019-07-22 PROCEDURE — 63710000001 INSULIN LISPRO (HUMAN) PER 5 UNITS: Performed by: INTERNAL MEDICINE

## 2019-07-22 PROCEDURE — 92610 EVALUATE SWALLOWING FUNCTION: CPT

## 2019-07-22 PROCEDURE — 63710000001 INSULIN GLARGINE PER 5 UNITS: Performed by: INTERNAL MEDICINE

## 2019-07-22 PROCEDURE — 70450 CT HEAD/BRAIN W/O DYE: CPT

## 2019-07-22 RX ORDER — HYDRALAZINE HYDROCHLORIDE 50 MG/1
50 TABLET, FILM COATED ORAL EVERY 8 HOURS PRN
Status: DISCONTINUED | OUTPATIENT
Start: 2019-07-22 | End: 2019-07-23

## 2019-07-22 RX ADMIN — ASPIRIN 325 MG: 325 TABLET ORAL at 09:09

## 2019-07-22 RX ADMIN — AMLODIPINE BESYLATE 10 MG: 10 TABLET ORAL at 09:09

## 2019-07-22 RX ADMIN — INSULIN LISPRO 2 UNITS: 100 INJECTION, SOLUTION INTRAVENOUS; SUBCUTANEOUS at 15:49

## 2019-07-22 RX ADMIN — DORZOLAMIDE HYDROCHLORIDE 1 DROP: 20 SOLUTION/ DROPS OPHTHALMIC at 09:10

## 2019-07-22 RX ADMIN — HYDRALAZINE HYDROCHLORIDE 50 MG: 50 TABLET, FILM COATED ORAL at 06:31

## 2019-07-22 RX ADMIN — INSULIN LISPRO 2 UNITS: 100 INJECTION, SOLUTION INTRAVENOUS; SUBCUTANEOUS at 09:09

## 2019-07-22 RX ADMIN — LOSARTAN POTASSIUM: 50 TABLET, FILM COATED ORAL at 09:09

## 2019-07-22 RX ADMIN — NEBIVOLOL HYDROCHLORIDE 20 MG: 10 TABLET ORAL at 09:09

## 2019-07-22 RX ADMIN — BRIMONIDINE TARTRATE 1 DROP: 2 SOLUTION OPHTHALMIC at 21:14

## 2019-07-22 RX ADMIN — SODIUM CHLORIDE, PRESERVATIVE FREE 3 ML: 5 INJECTION INTRAVENOUS at 21:20

## 2019-07-22 RX ADMIN — NYSTATIN 500000 UNITS: 500000 SUSPENSION ORAL at 21:13

## 2019-07-22 RX ADMIN — INSULIN LISPRO 2 UNITS: 100 INJECTION, SOLUTION INTRAVENOUS; SUBCUTANEOUS at 03:36

## 2019-07-22 RX ADMIN — INSULIN GLARGINE 10 UNITS: 100 INJECTION, SOLUTION SUBCUTANEOUS at 21:12

## 2019-07-22 RX ADMIN — APIXABAN 5 MG: 5 TABLET, FILM COATED ORAL at 21:13

## 2019-07-22 RX ADMIN — INSULIN LISPRO 4 UNITS: 100 INJECTION, SOLUTION INTRAVENOUS; SUBCUTANEOUS at 21:12

## 2019-07-22 RX ADMIN — ATORVASTATIN CALCIUM 80 MG: 80 TABLET, FILM COATED ORAL at 21:13

## 2019-07-22 RX ADMIN — GLIPIZIDE 10 MG: 10 TABLET ORAL at 06:31

## 2019-07-22 RX ADMIN — SODIUM CHLORIDE, PRESERVATIVE FREE 3 ML: 5 INJECTION INTRAVENOUS at 09:09

## 2019-07-22 RX ADMIN — GLIPIZIDE 10 MG: 10 TABLET ORAL at 16:32

## 2019-07-22 RX ADMIN — NYSTATIN 500000 UNITS: 500000 SUSPENSION ORAL at 15:09

## 2019-07-22 RX ADMIN — BRIMONIDINE TARTRATE 1 DROP: 2 SOLUTION OPHTHALMIC at 09:10

## 2019-07-22 RX ADMIN — DORZOLAMIDE HYDROCHLORIDE 1 DROP: 20 SOLUTION/ DROPS OPHTHALMIC at 21:14

## 2019-07-22 RX ADMIN — HYDRALAZINE HYDROCHLORIDE 50 MG: 50 TABLET, FILM COATED ORAL at 21:13

## 2019-07-22 RX ADMIN — APIXABAN 5 MG: 5 TABLET, FILM COATED ORAL at 09:09

## 2019-07-23 ENCOUNTER — TELEPHONE (OUTPATIENT)
Dept: NEUROSURGERY | Facility: CLINIC | Age: 74
End: 2019-07-23

## 2019-07-23 LAB
ANION GAP SERPL CALCULATED.3IONS-SCNC: 7.6 MMOL/L (ref 5–15)
BUN BLD-MCNC: 27 MG/DL (ref 8–23)
BUN/CREAT SERPL: 44.3 (ref 7–25)
CALCIUM SPEC-SCNC: 7.8 MG/DL (ref 8.6–10.5)
CHLORIDE SERPL-SCNC: 103 MMOL/L (ref 98–107)
CO2 SERPL-SCNC: 28.4 MMOL/L (ref 22–29)
CREAT BLD-MCNC: 0.61 MG/DL (ref 0.57–1)
DEPRECATED RDW RBC AUTO: 51.8 FL (ref 37–54)
ERYTHROCYTE [DISTWIDTH] IN BLOOD BY AUTOMATED COUNT: 16.7 % (ref 12.3–15.4)
GFR SERPL CREATININE-BSD FRML MDRD: 96 ML/MIN/1.73
GLUCOSE BLD-MCNC: 198 MG/DL (ref 65–99)
GLUCOSE BLDC GLUCOMTR-MCNC: 153 MG/DL (ref 70–130)
GLUCOSE BLDC GLUCOMTR-MCNC: 159 MG/DL (ref 70–130)
GLUCOSE BLDC GLUCOMTR-MCNC: 170 MG/DL (ref 70–130)
GLUCOSE BLDC GLUCOMTR-MCNC: 176 MG/DL (ref 70–130)
GLUCOSE BLDC GLUCOMTR-MCNC: 176 MG/DL (ref 70–130)
GLUCOSE BLDC GLUCOMTR-MCNC: 179 MG/DL (ref 70–130)
HCT VFR BLD AUTO: 28.6 % (ref 34–46.6)
HGB BLD-MCNC: 9.3 G/DL (ref 12–15.9)
MCH RBC QN AUTO: 28.1 PG (ref 26.6–33)
MCHC RBC AUTO-ENTMCNC: 32.5 G/DL (ref 31.5–35.7)
MCV RBC AUTO: 86.4 FL (ref 79–97)
PLATELET # BLD AUTO: 324 10*3/MM3 (ref 140–450)
PMV BLD AUTO: 10 FL (ref 6–12)
POTASSIUM BLD-SCNC: 3.7 MMOL/L (ref 3.5–5.2)
RBC # BLD AUTO: 3.31 10*6/MM3 (ref 3.77–5.28)
SODIUM BLD-SCNC: 139 MMOL/L (ref 136–145)
WBC NRBC COR # BLD: 10.43 10*3/MM3 (ref 3.4–10.8)

## 2019-07-23 PROCEDURE — 63710000001 INSULIN LISPRO (HUMAN) PER 5 UNITS: Performed by: INTERNAL MEDICINE

## 2019-07-23 PROCEDURE — 82962 GLUCOSE BLOOD TEST: CPT

## 2019-07-23 PROCEDURE — 80048 BASIC METABOLIC PNL TOTAL CA: CPT | Performed by: INTERNAL MEDICINE

## 2019-07-23 PROCEDURE — 63710000001 INSULIN REGULAR HUMAN PER 5 UNITS: Performed by: INTERNAL MEDICINE

## 2019-07-23 PROCEDURE — 99024 POSTOP FOLLOW-UP VISIT: CPT | Performed by: NURSE PRACTITIONER

## 2019-07-23 PROCEDURE — 63710000001 INSULIN GLARGINE PER 5 UNITS: Performed by: INTERNAL MEDICINE

## 2019-07-23 PROCEDURE — 97110 THERAPEUTIC EXERCISES: CPT

## 2019-07-23 PROCEDURE — 85027 COMPLETE CBC AUTOMATED: CPT | Performed by: INTERNAL MEDICINE

## 2019-07-23 RX ORDER — HYDRALAZINE HYDROCHLORIDE 50 MG/1
50 TABLET, FILM COATED ORAL EVERY 8 HOURS PRN
Status: DISCONTINUED | OUTPATIENT
Start: 2019-07-23 | End: 2019-07-31 | Stop reason: HOSPADM

## 2019-07-23 RX ORDER — POTASSIUM CHLORIDE 1.5 G/1.77G
40 POWDER, FOR SOLUTION ORAL AS NEEDED
Status: DISCONTINUED | OUTPATIENT
Start: 2019-07-23 | End: 2019-07-31 | Stop reason: HOSPADM

## 2019-07-23 RX ADMIN — NYSTATIN 500000 UNITS: 500000 SUSPENSION ORAL at 17:25

## 2019-07-23 RX ADMIN — GLIPIZIDE 10 MG: 10 TABLET ORAL at 06:54

## 2019-07-23 RX ADMIN — INSULIN LISPRO 2 UNITS: 100 INJECTION, SOLUTION INTRAVENOUS; SUBCUTANEOUS at 09:01

## 2019-07-23 RX ADMIN — SODIUM CHLORIDE, PRESERVATIVE FREE 3 ML: 5 INJECTION INTRAVENOUS at 21:29

## 2019-07-23 RX ADMIN — SODIUM CHLORIDE, PRESERVATIVE FREE 3 ML: 5 INJECTION INTRAVENOUS at 09:03

## 2019-07-23 RX ADMIN — NEBIVOLOL HYDROCHLORIDE 20 MG: 10 TABLET ORAL at 09:01

## 2019-07-23 RX ADMIN — NYSTATIN 500000 UNITS: 500000 SUSPENSION ORAL at 09:01

## 2019-07-23 RX ADMIN — ASPIRIN 325 MG: 325 TABLET ORAL at 09:01

## 2019-07-23 RX ADMIN — INSULIN LISPRO 2 UNITS: 100 INJECTION, SOLUTION INTRAVENOUS; SUBCUTANEOUS at 13:58

## 2019-07-23 RX ADMIN — INSULIN HUMAN 3 UNITS: 100 INJECTION, SOLUTION PARENTERAL at 17:25

## 2019-07-23 RX ADMIN — NYSTATIN 500000 UNITS: 500000 SUSPENSION ORAL at 21:26

## 2019-07-23 RX ADMIN — HYDRALAZINE HYDROCHLORIDE 50 MG: 50 TABLET, FILM COATED ORAL at 06:04

## 2019-07-23 RX ADMIN — BRIMONIDINE TARTRATE 1 DROP: 2 SOLUTION OPHTHALMIC at 21:29

## 2019-07-23 RX ADMIN — APIXABAN 5 MG: 5 TABLET, FILM COATED ORAL at 09:01

## 2019-07-23 RX ADMIN — INSULIN HUMAN 3 UNITS: 100 INJECTION, SOLUTION PARENTERAL at 21:26

## 2019-07-23 RX ADMIN — LOSARTAN POTASSIUM: 50 TABLET, FILM COATED ORAL at 09:01

## 2019-07-23 RX ADMIN — DORZOLAMIDE HYDROCHLORIDE 1 DROP: 20 SOLUTION/ DROPS OPHTHALMIC at 09:02

## 2019-07-23 RX ADMIN — BRIMONIDINE TARTRATE 1 DROP: 2 SOLUTION OPHTHALMIC at 09:02

## 2019-07-23 RX ADMIN — POTASSIUM CHLORIDE 40 MEQ: 1.5 POWDER, FOR SOLUTION ORAL at 09:08

## 2019-07-23 RX ADMIN — APIXABAN 5 MG: 5 TABLET, FILM COATED ORAL at 21:26

## 2019-07-23 RX ADMIN — INSULIN LISPRO 2 UNITS: 100 INJECTION, SOLUTION INTRAVENOUS; SUBCUTANEOUS at 01:39

## 2019-07-23 RX ADMIN — DORZOLAMIDE HYDROCHLORIDE 1 DROP: 20 SOLUTION/ DROPS OPHTHALMIC at 21:29

## 2019-07-23 RX ADMIN — INSULIN LISPRO 2 UNITS: 100 INJECTION, SOLUTION INTRAVENOUS; SUBCUTANEOUS at 05:48

## 2019-07-23 RX ADMIN — INSULIN GLARGINE 10 UNITS: 100 INJECTION, SOLUTION SUBCUTANEOUS at 21:27

## 2019-07-23 RX ADMIN — ATORVASTATIN CALCIUM 80 MG: 80 TABLET, FILM COATED ORAL at 21:26

## 2019-07-23 RX ADMIN — GLIPIZIDE 10 MG: 10 TABLET ORAL at 17:25

## 2019-07-23 NOTE — TELEPHONE ENCOUNTER
----- Message from Mee Martel sent at 7/23/2019 11:47 AM EDT -----   called- he has been working and not at bedside when providers rounding daily. He has some questions and concerns. She is apparently being moved to neurology. He would like to either know what time we will round tomm or be able to schedule a few minutes to talk with Dr. Oliva about Landy.     Work [phone] can leave message with   Cell [phone]

## 2019-07-24 LAB
ACT BLD: 109 SECONDS (ref 82–152)
ACT BLD: 120 SECONDS (ref 82–152)
ACT BLD: 202 SECONDS (ref 82–152)
ACT BLD: 208 SECONDS (ref 82–152)
GLUCOSE BLDC GLUCOMTR-MCNC: 108 MG/DL (ref 70–130)
GLUCOSE BLDC GLUCOMTR-MCNC: 113 MG/DL (ref 70–130)
GLUCOSE BLDC GLUCOMTR-MCNC: 130 MG/DL (ref 70–130)
GLUCOSE BLDC GLUCOMTR-MCNC: 149 MG/DL (ref 70–130)
GLUCOSE BLDC GLUCOMTR-MCNC: 165 MG/DL (ref 70–130)
GLUCOSE BLDC GLUCOMTR-MCNC: 199 MG/DL (ref 70–130)

## 2019-07-24 PROCEDURE — 63710000001 INSULIN REGULAR HUMAN PER 5 UNITS: Performed by: INTERNAL MEDICINE

## 2019-07-24 PROCEDURE — 82962 GLUCOSE BLOOD TEST: CPT

## 2019-07-24 PROCEDURE — 99024 POSTOP FOLLOW-UP VISIT: CPT | Performed by: NURSE PRACTITIONER

## 2019-07-24 PROCEDURE — 63710000001 INSULIN GLARGINE PER 5 UNITS: Performed by: INTERNAL MEDICINE

## 2019-07-24 RX ADMIN — LOSARTAN POTASSIUM: 50 TABLET, FILM COATED ORAL at 08:29

## 2019-07-24 RX ADMIN — ATORVASTATIN CALCIUM 80 MG: 80 TABLET, FILM COATED ORAL at 23:10

## 2019-07-24 RX ADMIN — SODIUM CHLORIDE, PRESERVATIVE FREE 3 ML: 5 INJECTION INTRAVENOUS at 23:55

## 2019-07-24 RX ADMIN — DORZOLAMIDE HYDROCHLORIDE 1 DROP: 20 SOLUTION/ DROPS OPHTHALMIC at 08:29

## 2019-07-24 RX ADMIN — APIXABAN 5 MG: 5 TABLET, FILM COATED ORAL at 08:28

## 2019-07-24 RX ADMIN — NYSTATIN 500000 UNITS: 500000 SUSPENSION ORAL at 18:33

## 2019-07-24 RX ADMIN — ASPIRIN 325 MG: 325 TABLET ORAL at 08:28

## 2019-07-24 RX ADMIN — GLIPIZIDE 10 MG: 10 TABLET ORAL at 06:55

## 2019-07-24 RX ADMIN — NEBIVOLOL HYDROCHLORIDE 20 MG: 10 TABLET ORAL at 08:30

## 2019-07-24 RX ADMIN — BRIMONIDINE TARTRATE 1 DROP: 2 SOLUTION OPHTHALMIC at 08:28

## 2019-07-24 RX ADMIN — NYSTATIN 500000 UNITS: 500000 SUSPENSION ORAL at 12:23

## 2019-07-24 RX ADMIN — NYSTATIN 500000 UNITS: 500000 SUSPENSION ORAL at 09:31

## 2019-07-24 RX ADMIN — INSULIN GLARGINE 10 UNITS: 100 INJECTION, SOLUTION SUBCUTANEOUS at 23:51

## 2019-07-24 RX ADMIN — SODIUM CHLORIDE, PRESERVATIVE FREE 3 ML: 5 INJECTION INTRAVENOUS at 08:30

## 2019-07-24 RX ADMIN — INSULIN HUMAN 3 UNITS: 100 INJECTION, SOLUTION PARENTERAL at 23:52

## 2019-07-24 RX ADMIN — NYSTATIN 500000 UNITS: 500000 SUSPENSION ORAL at 23:10

## 2019-07-24 RX ADMIN — INSULIN HUMAN 3 UNITS: 100 INJECTION, SOLUTION PARENTERAL at 08:29

## 2019-07-24 RX ADMIN — APIXABAN 5 MG: 5 TABLET, FILM COATED ORAL at 23:10

## 2019-07-24 RX ADMIN — GLIPIZIDE 10 MG: 10 TABLET ORAL at 18:33

## 2019-07-25 ENCOUNTER — APPOINTMENT (OUTPATIENT)
Dept: CT IMAGING | Facility: HOSPITAL | Age: 74
End: 2019-07-25

## 2019-07-25 LAB
GLUCOSE BLDC GLUCOMTR-MCNC: 148 MG/DL (ref 70–130)
GLUCOSE BLDC GLUCOMTR-MCNC: 151 MG/DL (ref 70–130)
GLUCOSE BLDC GLUCOMTR-MCNC: 181 MG/DL (ref 70–130)
GLUCOSE BLDC GLUCOMTR-MCNC: 212 MG/DL (ref 70–130)

## 2019-07-25 PROCEDURE — 92526 ORAL FUNCTION THERAPY: CPT | Performed by: SPEECH-LANGUAGE PATHOLOGIST

## 2019-07-25 PROCEDURE — 63710000001 INSULIN GLARGINE PER 5 UNITS: Performed by: INTERNAL MEDICINE

## 2019-07-25 PROCEDURE — 99024 POSTOP FOLLOW-UP VISIT: CPT | Performed by: NURSE PRACTITIONER

## 2019-07-25 PROCEDURE — 70498 CT ANGIOGRAPHY NECK: CPT

## 2019-07-25 PROCEDURE — 0042T HC CT CEREBRAL PERFUSION W/WO CONTRAST: CPT

## 2019-07-25 PROCEDURE — 0 IOPAMIDOL PER 1 ML: Performed by: NEUROLOGICAL SURGERY

## 2019-07-25 PROCEDURE — 82962 GLUCOSE BLOOD TEST: CPT

## 2019-07-25 PROCEDURE — 63710000001 INSULIN REGULAR HUMAN PER 5 UNITS: Performed by: NURSE PRACTITIONER

## 2019-07-25 PROCEDURE — 97110 THERAPEUTIC EXERCISES: CPT

## 2019-07-25 PROCEDURE — 70496 CT ANGIOGRAPHY HEAD: CPT

## 2019-07-25 RX ORDER — BACLOFEN 10 MG/1
5 TABLET ORAL EVERY 12 HOURS SCHEDULED
Status: DISCONTINUED | OUTPATIENT
Start: 2019-07-25 | End: 2019-07-25

## 2019-07-25 RX ADMIN — NYSTATIN 500000 UNITS: 500000 SUSPENSION ORAL at 20:12

## 2019-07-25 RX ADMIN — BRIMONIDINE TARTRATE 1 DROP: 2 SOLUTION OPHTHALMIC at 22:18

## 2019-07-25 RX ADMIN — ATORVASTATIN CALCIUM 80 MG: 80 TABLET, FILM COATED ORAL at 20:12

## 2019-07-25 RX ADMIN — SODIUM CHLORIDE, PRESERVATIVE FREE 3 ML: 5 INJECTION INTRAVENOUS at 08:03

## 2019-07-25 RX ADMIN — INSULIN HUMAN 5 UNITS: 100 INJECTION, SOLUTION PARENTERAL at 23:46

## 2019-07-25 RX ADMIN — BACLOFEN 5 MG: 10 TABLET ORAL at 13:30

## 2019-07-25 RX ADMIN — SODIUM CHLORIDE, PRESERVATIVE FREE 3 ML: 5 INJECTION INTRAVENOUS at 20:13

## 2019-07-25 RX ADMIN — APIXABAN 5 MG: 5 TABLET, FILM COATED ORAL at 08:03

## 2019-07-25 RX ADMIN — APIXABAN 5 MG: 5 TABLET, FILM COATED ORAL at 20:12

## 2019-07-25 RX ADMIN — INSULIN HUMAN 3 UNITS: 100 INJECTION, SOLUTION PARENTERAL at 06:05

## 2019-07-25 RX ADMIN — DORZOLAMIDE HYDROCHLORIDE 1 DROP: 20 SOLUTION/ DROPS OPHTHALMIC at 22:18

## 2019-07-25 RX ADMIN — DORZOLAMIDE HYDROCHLORIDE 1 DROP: 20 SOLUTION/ DROPS OPHTHALMIC at 08:02

## 2019-07-25 RX ADMIN — IOPAMIDOL 150 ML: 755 INJECTION, SOLUTION INTRAVENOUS at 17:13

## 2019-07-25 RX ADMIN — BRIMONIDINE TARTRATE 1 DROP: 2 SOLUTION OPHTHALMIC at 08:02

## 2019-07-25 RX ADMIN — NEBIVOLOL HYDROCHLORIDE 20 MG: 10 TABLET ORAL at 08:02

## 2019-07-25 RX ADMIN — ASPIRIN 325 MG: 325 TABLET ORAL at 08:03

## 2019-07-25 RX ADMIN — NYSTATIN 500000 UNITS: 500000 SUSPENSION ORAL at 08:03

## 2019-07-25 RX ADMIN — HYDRALAZINE HYDROCHLORIDE 50 MG: 50 TABLET, FILM COATED ORAL at 20:13

## 2019-07-25 RX ADMIN — LOSARTAN POTASSIUM: 50 TABLET, FILM COATED ORAL at 08:02

## 2019-07-25 RX ADMIN — GLIPIZIDE 10 MG: 10 TABLET ORAL at 06:05

## 2019-07-25 RX ADMIN — NYSTATIN 500000 UNITS: 500000 SUSPENSION ORAL at 13:00

## 2019-07-25 RX ADMIN — INSULIN GLARGINE 10 UNITS: 100 INJECTION, SOLUTION SUBCUTANEOUS at 20:21

## 2019-07-25 NOTE — TELEPHONE ENCOUNTER
Mr Workman says since Greta saw the patient, Angie in therapy called him and said they had a meeting of therapist and recommend pt to be transferred to subacute therapy at a different hospital or  nursing home.   Ajit Ji would like to talk to Greta or Dr Oliva about this.      Pt is in neurology. The family under no circumstances wants patient transferred from  Neurology until family has a meeting with all people responsible for Landy's care.    Ajit [phone]

## 2019-07-26 LAB
GLUCOSE BLDC GLUCOMTR-MCNC: 121 MG/DL (ref 70–130)
GLUCOSE BLDC GLUCOMTR-MCNC: 174 MG/DL (ref 70–130)
GLUCOSE BLDC GLUCOMTR-MCNC: 213 MG/DL (ref 70–130)

## 2019-07-26 PROCEDURE — 82962 GLUCOSE BLOOD TEST: CPT

## 2019-07-26 PROCEDURE — 63710000001 INSULIN REGULAR HUMAN PER 5 UNITS: Performed by: NURSE PRACTITIONER

## 2019-07-26 PROCEDURE — 99024 POSTOP FOLLOW-UP VISIT: CPT | Performed by: NEUROLOGICAL SURGERY

## 2019-07-26 PROCEDURE — 97110 THERAPEUTIC EXERCISES: CPT

## 2019-07-26 PROCEDURE — 63710000001 INSULIN GLARGINE PER 5 UNITS: Performed by: INTERNAL MEDICINE

## 2019-07-26 PROCEDURE — 97535 SELF CARE MNGMENT TRAINING: CPT | Performed by: OCCUPATIONAL THERAPIST

## 2019-07-26 PROCEDURE — 97112 NEUROMUSCULAR REEDUCATION: CPT | Performed by: OCCUPATIONAL THERAPIST

## 2019-07-26 RX ORDER — AMANTADINE HYDROCHLORIDE 100 MG/1
100 CAPSULE, GELATIN COATED ORAL EVERY MORNING
Status: DISCONTINUED | OUTPATIENT
Start: 2019-07-27 | End: 2019-07-31 | Stop reason: HOSPADM

## 2019-07-26 RX ORDER — AMANTADINE HYDROCHLORIDE 100 MG/1
100 CAPSULE, GELATIN COATED ORAL DAILY
Status: DISCONTINUED | OUTPATIENT
Start: 2019-07-26 | End: 2019-07-26

## 2019-07-26 RX ADMIN — INSULIN HUMAN 3 UNITS: 100 INJECTION, SOLUTION PARENTERAL at 06:23

## 2019-07-26 RX ADMIN — INSULIN HUMAN 5 UNITS: 100 INJECTION, SOLUTION PARENTERAL at 18:30

## 2019-07-26 RX ADMIN — NYSTATIN 500000 UNITS: 500000 SUSPENSION ORAL at 12:44

## 2019-07-26 RX ADMIN — NEBIVOLOL HYDROCHLORIDE 20 MG: 10 TABLET ORAL at 11:13

## 2019-07-26 RX ADMIN — DORZOLAMIDE HYDROCHLORIDE 1 DROP: 20 SOLUTION/ DROPS OPHTHALMIC at 22:41

## 2019-07-26 RX ADMIN — NYSTATIN 500000 UNITS: 500000 SUSPENSION ORAL at 08:42

## 2019-07-26 RX ADMIN — ATORVASTATIN CALCIUM 80 MG: 80 TABLET, FILM COATED ORAL at 22:40

## 2019-07-26 RX ADMIN — NYSTATIN 500000 UNITS: 500000 SUSPENSION ORAL at 22:41

## 2019-07-26 RX ADMIN — NYSTATIN 500000 UNITS: 500000 SUSPENSION ORAL at 17:49

## 2019-07-26 RX ADMIN — DORZOLAMIDE HYDROCHLORIDE 1 DROP: 20 SOLUTION/ DROPS OPHTHALMIC at 08:42

## 2019-07-26 RX ADMIN — ASPIRIN 325 MG: 325 TABLET ORAL at 08:42

## 2019-07-26 RX ADMIN — SODIUM CHLORIDE, PRESERVATIVE FREE 3 ML: 5 INJECTION INTRAVENOUS at 08:43

## 2019-07-26 RX ADMIN — GLIPIZIDE 10 MG: 10 TABLET ORAL at 06:23

## 2019-07-26 RX ADMIN — BRIMONIDINE TARTRATE 1 DROP: 2 SOLUTION OPHTHALMIC at 22:41

## 2019-07-26 RX ADMIN — APIXABAN 5 MG: 5 TABLET, FILM COATED ORAL at 08:41

## 2019-07-26 RX ADMIN — INSULIN GLARGINE 10 UNITS: 100 INJECTION, SOLUTION SUBCUTANEOUS at 22:40

## 2019-07-26 RX ADMIN — LOSARTAN POTASSIUM: 50 TABLET, FILM COATED ORAL at 11:13

## 2019-07-26 RX ADMIN — GLIPIZIDE 10 MG: 10 TABLET ORAL at 17:48

## 2019-07-26 RX ADMIN — SODIUM CHLORIDE, PRESERVATIVE FREE 3 ML: 5 INJECTION INTRAVENOUS at 22:41

## 2019-07-26 RX ADMIN — BRIMONIDINE TARTRATE 1 DROP: 2 SOLUTION OPHTHALMIC at 08:42

## 2019-07-27 LAB
GLUCOSE BLDC GLUCOMTR-MCNC: 120 MG/DL (ref 70–130)
GLUCOSE BLDC GLUCOMTR-MCNC: 167 MG/DL (ref 70–130)
GLUCOSE BLDC GLUCOMTR-MCNC: 182 MG/DL (ref 70–130)
GLUCOSE BLDC GLUCOMTR-MCNC: 229 MG/DL (ref 70–130)

## 2019-07-27 PROCEDURE — 63710000001 INSULIN GLARGINE PER 5 UNITS: Performed by: INTERNAL MEDICINE

## 2019-07-27 PROCEDURE — 99024 POSTOP FOLLOW-UP VISIT: CPT | Performed by: NEUROLOGICAL SURGERY

## 2019-07-27 PROCEDURE — 82962 GLUCOSE BLOOD TEST: CPT

## 2019-07-27 PROCEDURE — 63710000001 INSULIN REGULAR HUMAN PER 5 UNITS: Performed by: NURSE PRACTITIONER

## 2019-07-27 RX ADMIN — INSULIN HUMAN 5 UNITS: 100 INJECTION, SOLUTION PARENTERAL at 11:26

## 2019-07-27 RX ADMIN — BRIMONIDINE TARTRATE 1 DROP: 2 SOLUTION OPHTHALMIC at 21:15

## 2019-07-27 RX ADMIN — APIXABAN 5 MG: 5 TABLET, FILM COATED ORAL at 21:17

## 2019-07-27 RX ADMIN — DORZOLAMIDE HYDROCHLORIDE 1 DROP: 20 SOLUTION/ DROPS OPHTHALMIC at 21:15

## 2019-07-27 RX ADMIN — SODIUM CHLORIDE, PRESERVATIVE FREE 3 ML: 5 INJECTION INTRAVENOUS at 10:48

## 2019-07-27 RX ADMIN — NYSTATIN 500000 UNITS: 500000 SUSPENSION ORAL at 18:55

## 2019-07-27 RX ADMIN — ATORVASTATIN CALCIUM 80 MG: 80 TABLET, FILM COATED ORAL at 21:17

## 2019-07-27 RX ADMIN — BRIMONIDINE TARTRATE 1 DROP: 2 SOLUTION OPHTHALMIC at 10:48

## 2019-07-27 RX ADMIN — NYSTATIN 500000 UNITS: 500000 SUSPENSION ORAL at 14:09

## 2019-07-27 RX ADMIN — NYSTATIN 500000 UNITS: 500000 SUSPENSION ORAL at 21:17

## 2019-07-27 RX ADMIN — NYSTATIN 500000 UNITS: 500000 SUSPENSION ORAL at 10:48

## 2019-07-27 RX ADMIN — LOSARTAN POTASSIUM: 50 TABLET, FILM COATED ORAL at 10:47

## 2019-07-27 RX ADMIN — SODIUM CHLORIDE, PRESERVATIVE FREE 3 ML: 5 INJECTION INTRAVENOUS at 21:15

## 2019-07-27 RX ADMIN — APIXABAN 5 MG: 5 TABLET, FILM COATED ORAL at 10:47

## 2019-07-27 RX ADMIN — GLIPIZIDE 10 MG: 10 TABLET ORAL at 17:06

## 2019-07-27 RX ADMIN — APIXABAN 5 MG: 5 TABLET, FILM COATED ORAL at 01:01

## 2019-07-27 RX ADMIN — ASPIRIN 325 MG: 325 TABLET ORAL at 10:47

## 2019-07-27 RX ADMIN — HYDRALAZINE HYDROCHLORIDE 50 MG: 50 TABLET, FILM COATED ORAL at 22:18

## 2019-07-27 RX ADMIN — INSULIN HUMAN 3 UNITS: 100 INJECTION, SOLUTION PARENTERAL at 18:51

## 2019-07-27 RX ADMIN — DORZOLAMIDE HYDROCHLORIDE 1 DROP: 20 SOLUTION/ DROPS OPHTHALMIC at 10:48

## 2019-07-27 RX ADMIN — INSULIN HUMAN 3 UNITS: 100 INJECTION, SOLUTION PARENTERAL at 14:07

## 2019-07-27 RX ADMIN — NEBIVOLOL HYDROCHLORIDE 20 MG: 10 TABLET ORAL at 10:48

## 2019-07-27 RX ADMIN — HYDRALAZINE HYDROCHLORIDE 50 MG: 50 TABLET, FILM COATED ORAL at 14:07

## 2019-07-27 RX ADMIN — ONDANSETRON 4 MG: 4 TABLET, FILM COATED ORAL at 14:07

## 2019-07-27 RX ADMIN — INSULIN GLARGINE 10 UNITS: 100 INJECTION, SOLUTION SUBCUTANEOUS at 21:21

## 2019-07-27 RX ADMIN — AMANTADINE HYDROCHLORIDE 100 MG: 100 CAPSULE ORAL at 06:52

## 2019-07-27 RX ADMIN — GLIPIZIDE 10 MG: 10 TABLET ORAL at 06:52

## 2019-07-28 ENCOUNTER — APPOINTMENT (OUTPATIENT)
Dept: GENERAL RADIOLOGY | Facility: HOSPITAL | Age: 74
End: 2019-07-28

## 2019-07-28 LAB
GLUCOSE BLDC GLUCOMTR-MCNC: 133 MG/DL (ref 70–130)
GLUCOSE BLDC GLUCOMTR-MCNC: 151 MG/DL (ref 70–130)
GLUCOSE BLDC GLUCOMTR-MCNC: 216 MG/DL (ref 70–130)
GLUCOSE BLDC GLUCOMTR-MCNC: 221 MG/DL (ref 70–130)

## 2019-07-28 PROCEDURE — 63710000001 INSULIN REGULAR HUMAN PER 5 UNITS: Performed by: NURSE PRACTITIONER

## 2019-07-28 PROCEDURE — 97110 THERAPEUTIC EXERCISES: CPT

## 2019-07-28 PROCEDURE — 63710000001 INSULIN GLARGINE PER 5 UNITS: Performed by: INTERNAL MEDICINE

## 2019-07-28 PROCEDURE — 99024 POSTOP FOLLOW-UP VISIT: CPT | Performed by: NEUROLOGICAL SURGERY

## 2019-07-28 PROCEDURE — 82962 GLUCOSE BLOOD TEST: CPT

## 2019-07-28 PROCEDURE — 74018 RADEX ABDOMEN 1 VIEW: CPT

## 2019-07-28 RX ADMIN — ATORVASTATIN CALCIUM 80 MG: 80 TABLET, FILM COATED ORAL at 23:36

## 2019-07-28 RX ADMIN — INSULIN HUMAN 3 UNITS: 100 INJECTION, SOLUTION PARENTERAL at 18:23

## 2019-07-28 RX ADMIN — NYSTATIN 500000 UNITS: 500000 SUSPENSION ORAL at 23:36

## 2019-07-28 RX ADMIN — APIXABAN 5 MG: 5 TABLET, FILM COATED ORAL at 23:36

## 2019-07-28 RX ADMIN — BRIMONIDINE TARTRATE 1 DROP: 2 SOLUTION OPHTHALMIC at 08:12

## 2019-07-28 RX ADMIN — DORZOLAMIDE HYDROCHLORIDE 1 DROP: 20 SOLUTION/ DROPS OPHTHALMIC at 23:36

## 2019-07-28 RX ADMIN — SODIUM CHLORIDE, PRESERVATIVE FREE 3 ML: 5 INJECTION INTRAVENOUS at 23:36

## 2019-07-28 RX ADMIN — SODIUM CHLORIDE, PRESERVATIVE FREE 3 ML: 5 INJECTION INTRAVENOUS at 08:12

## 2019-07-28 RX ADMIN — BRIMONIDINE TARTRATE 1 DROP: 2 SOLUTION OPHTHALMIC at 23:35

## 2019-07-28 RX ADMIN — NEBIVOLOL HYDROCHLORIDE 20 MG: 10 TABLET ORAL at 08:11

## 2019-07-28 RX ADMIN — LOSARTAN POTASSIUM: 50 TABLET, FILM COATED ORAL at 08:11

## 2019-07-28 RX ADMIN — DORZOLAMIDE HYDROCHLORIDE 1 DROP: 20 SOLUTION/ DROPS OPHTHALMIC at 08:11

## 2019-07-28 RX ADMIN — INSULIN HUMAN 5 UNITS: 100 INJECTION, SOLUTION PARENTERAL at 11:45

## 2019-07-28 RX ADMIN — AMANTADINE HYDROCHLORIDE 100 MG: 100 CAPSULE ORAL at 08:11

## 2019-07-28 RX ADMIN — ASPIRIN 325 MG: 325 TABLET ORAL at 08:11

## 2019-07-28 RX ADMIN — GLIPIZIDE 10 MG: 10 TABLET ORAL at 18:24

## 2019-07-28 RX ADMIN — INSULIN GLARGINE 10 UNITS: 100 INJECTION, SOLUTION SUBCUTANEOUS at 23:38

## 2019-07-28 RX ADMIN — NYSTATIN 500000 UNITS: 500000 SUSPENSION ORAL at 11:45

## 2019-07-28 RX ADMIN — INSULIN HUMAN 5 UNITS: 100 INJECTION, SOLUTION PARENTERAL at 01:02

## 2019-07-28 RX ADMIN — HYDRALAZINE HYDROCHLORIDE 50 MG: 50 TABLET, FILM COATED ORAL at 10:52

## 2019-07-28 RX ADMIN — NYSTATIN 500000 UNITS: 500000 SUSPENSION ORAL at 08:11

## 2019-07-28 RX ADMIN — GLIPIZIDE 10 MG: 10 TABLET ORAL at 08:11

## 2019-07-28 RX ADMIN — NYSTATIN 500000 UNITS: 500000 SUSPENSION ORAL at 18:27

## 2019-07-28 RX ADMIN — APIXABAN 5 MG: 5 TABLET, FILM COATED ORAL at 08:11

## 2019-07-29 LAB
GLUCOSE BLDC GLUCOMTR-MCNC: 140 MG/DL (ref 70–130)
GLUCOSE BLDC GLUCOMTR-MCNC: 147 MG/DL (ref 70–130)
GLUCOSE BLDC GLUCOMTR-MCNC: 198 MG/DL (ref 70–130)
GLUCOSE BLDC GLUCOMTR-MCNC: 247 MG/DL (ref 70–130)

## 2019-07-29 PROCEDURE — 63710000001 INSULIN GLARGINE PER 5 UNITS: Performed by: INTERNAL MEDICINE

## 2019-07-29 PROCEDURE — 82962 GLUCOSE BLOOD TEST: CPT

## 2019-07-29 PROCEDURE — 97110 THERAPEUTIC EXERCISES: CPT

## 2019-07-29 PROCEDURE — 97535 SELF CARE MNGMENT TRAINING: CPT

## 2019-07-29 PROCEDURE — 99024 POSTOP FOLLOW-UP VISIT: CPT | Performed by: NURSE PRACTITIONER

## 2019-07-29 PROCEDURE — 63710000001 INSULIN REGULAR HUMAN PER 5 UNITS: Performed by: NURSE PRACTITIONER

## 2019-07-29 RX ADMIN — NYSTATIN 500000 UNITS: 500000 SUSPENSION ORAL at 20:10

## 2019-07-29 RX ADMIN — NYSTATIN 500000 UNITS: 500000 SUSPENSION ORAL at 07:58

## 2019-07-29 RX ADMIN — DORZOLAMIDE HYDROCHLORIDE 1 DROP: 20 SOLUTION/ DROPS OPHTHALMIC at 08:10

## 2019-07-29 RX ADMIN — AMANTADINE HYDROCHLORIDE 100 MG: 100 CAPSULE ORAL at 07:57

## 2019-07-29 RX ADMIN — DORZOLAMIDE HYDROCHLORIDE 1 DROP: 20 SOLUTION/ DROPS OPHTHALMIC at 20:10

## 2019-07-29 RX ADMIN — INSULIN GLARGINE 10 UNITS: 100 INJECTION, SOLUTION SUBCUTANEOUS at 20:12

## 2019-07-29 RX ADMIN — LOSARTAN POTASSIUM: 50 TABLET, FILM COATED ORAL at 10:35

## 2019-07-29 RX ADMIN — SODIUM CHLORIDE, PRESERVATIVE FREE 3 ML: 5 INJECTION INTRAVENOUS at 20:10

## 2019-07-29 RX ADMIN — NYSTATIN 500000 UNITS: 500000 SUSPENSION ORAL at 17:02

## 2019-07-29 RX ADMIN — INSULIN HUMAN 5 UNITS: 100 INJECTION, SOLUTION PARENTERAL at 18:15

## 2019-07-29 RX ADMIN — APIXABAN 5 MG: 5 TABLET, FILM COATED ORAL at 08:15

## 2019-07-29 RX ADMIN — ASPIRIN 325 MG: 325 TABLET ORAL at 08:15

## 2019-07-29 RX ADMIN — NYSTATIN 500000 UNITS: 500000 SUSPENSION ORAL at 12:43

## 2019-07-29 RX ADMIN — INSULIN HUMAN 3 UNITS: 100 INJECTION, SOLUTION PARENTERAL at 07:56

## 2019-07-29 RX ADMIN — BRIMONIDINE TARTRATE 1 DROP: 2 SOLUTION OPHTHALMIC at 08:10

## 2019-07-29 RX ADMIN — GLIPIZIDE 10 MG: 10 TABLET ORAL at 17:02

## 2019-07-29 RX ADMIN — NEBIVOLOL HYDROCHLORIDE 20 MG: 10 TABLET ORAL at 10:34

## 2019-07-29 RX ADMIN — APIXABAN 5 MG: 5 TABLET, FILM COATED ORAL at 20:10

## 2019-07-29 RX ADMIN — ATORVASTATIN CALCIUM 80 MG: 80 TABLET, FILM COATED ORAL at 20:10

## 2019-07-29 RX ADMIN — BRIMONIDINE TARTRATE 1 DROP: 2 SOLUTION OPHTHALMIC at 20:10

## 2019-07-29 RX ADMIN — SODIUM CHLORIDE, PRESERVATIVE FREE 3 ML: 5 INJECTION INTRAVENOUS at 08:10

## 2019-07-29 RX ADMIN — GLIPIZIDE 10 MG: 10 TABLET ORAL at 07:57

## 2019-07-30 LAB
GLUCOSE BLDC GLUCOMTR-MCNC: 120 MG/DL (ref 70–130)
GLUCOSE BLDC GLUCOMTR-MCNC: 135 MG/DL (ref 70–130)
GLUCOSE BLDC GLUCOMTR-MCNC: 185 MG/DL (ref 70–130)
GLUCOSE BLDC GLUCOMTR-MCNC: 202 MG/DL (ref 70–130)
GLUCOSE BLDC GLUCOMTR-MCNC: 206 MG/DL (ref 70–130)

## 2019-07-30 PROCEDURE — 63710000001 INSULIN GLARGINE PER 5 UNITS: Performed by: INTERNAL MEDICINE

## 2019-07-30 PROCEDURE — 92526 ORAL FUNCTION THERAPY: CPT

## 2019-07-30 PROCEDURE — 82962 GLUCOSE BLOOD TEST: CPT

## 2019-07-30 PROCEDURE — 99024 POSTOP FOLLOW-UP VISIT: CPT | Performed by: NEUROLOGICAL SURGERY

## 2019-07-30 PROCEDURE — 63710000001 INSULIN REGULAR HUMAN PER 5 UNITS: Performed by: NURSE PRACTITIONER

## 2019-07-30 PROCEDURE — 97110 THERAPEUTIC EXERCISES: CPT

## 2019-07-30 RX ADMIN — INSULIN HUMAN 5 UNITS: 100 INJECTION, SOLUTION PARENTERAL at 12:00

## 2019-07-30 RX ADMIN — AMANTADINE HYDROCHLORIDE 100 MG: 100 CAPSULE ORAL at 06:35

## 2019-07-30 RX ADMIN — DORZOLAMIDE HYDROCHLORIDE 1 DROP: 20 SOLUTION/ DROPS OPHTHALMIC at 08:18

## 2019-07-30 RX ADMIN — SODIUM CHLORIDE, PRESERVATIVE FREE 3 ML: 5 INJECTION INTRAVENOUS at 22:04

## 2019-07-30 RX ADMIN — BRIMONIDINE TARTRATE 1 DROP: 2 SOLUTION OPHTHALMIC at 08:26

## 2019-07-30 RX ADMIN — SODIUM CHLORIDE, PRESERVATIVE FREE 3 ML: 5 INJECTION INTRAVENOUS at 08:18

## 2019-07-30 RX ADMIN — INSULIN HUMAN 3 UNITS: 100 INJECTION, SOLUTION PARENTERAL at 06:38

## 2019-07-30 RX ADMIN — GLIPIZIDE 10 MG: 10 TABLET ORAL at 17:24

## 2019-07-30 RX ADMIN — NYSTATIN 500000 UNITS: 500000 SUSPENSION ORAL at 12:00

## 2019-07-30 RX ADMIN — LOSARTAN POTASSIUM: 50 TABLET, FILM COATED ORAL at 08:24

## 2019-07-30 RX ADMIN — NYSTATIN 500000 UNITS: 500000 SUSPENSION ORAL at 21:51

## 2019-07-30 RX ADMIN — DORZOLAMIDE HYDROCHLORIDE 1 DROP: 20 SOLUTION/ DROPS OPHTHALMIC at 21:52

## 2019-07-30 RX ADMIN — NEBIVOLOL HYDROCHLORIDE 20 MG: 10 TABLET ORAL at 08:25

## 2019-07-30 RX ADMIN — APIXABAN 5 MG: 5 TABLET, FILM COATED ORAL at 08:17

## 2019-07-30 RX ADMIN — INSULIN HUMAN 5 UNITS: 100 INJECTION, SOLUTION PARENTERAL at 18:25

## 2019-07-30 RX ADMIN — NYSTATIN 500000 UNITS: 500000 SUSPENSION ORAL at 17:24

## 2019-07-30 RX ADMIN — ASPIRIN 325 MG: 325 TABLET ORAL at 08:17

## 2019-07-30 RX ADMIN — ATORVASTATIN CALCIUM 80 MG: 80 TABLET, FILM COATED ORAL at 21:51

## 2019-07-30 RX ADMIN — GLIPIZIDE 10 MG: 10 TABLET ORAL at 06:35

## 2019-07-30 RX ADMIN — NYSTATIN 500000 UNITS: 500000 SUSPENSION ORAL at 08:17

## 2019-07-30 RX ADMIN — APIXABAN 5 MG: 5 TABLET, FILM COATED ORAL at 21:51

## 2019-07-30 RX ADMIN — INSULIN GLARGINE 10 UNITS: 100 INJECTION, SOLUTION SUBCUTANEOUS at 21:51

## 2019-07-30 RX ADMIN — BRIMONIDINE TARTRATE 1 DROP: 2 SOLUTION OPHTHALMIC at 21:52

## 2019-07-31 ENCOUNTER — HOSPITAL ENCOUNTER (INPATIENT)
Facility: HOSPITAL | Age: 74
LOS: 29 days | Discharge: HOME | End: 2019-08-29
Attending: PHYSICAL MEDICINE & REHABILITATION | Admitting: PHYSICAL MEDICINE & REHABILITATION

## 2019-07-31 ENCOUNTER — APPOINTMENT (OUTPATIENT)
Dept: GENERAL RADIOLOGY | Facility: HOSPITAL | Age: 74
End: 2019-07-31

## 2019-07-31 VITALS
TEMPERATURE: 97.6 F | HEIGHT: 65 IN | SYSTOLIC BLOOD PRESSURE: 168 MMHG | OXYGEN SATURATION: 96 % | DIASTOLIC BLOOD PRESSURE: 83 MMHG | HEART RATE: 88 BPM | RESPIRATION RATE: 14 BRPM | WEIGHT: 132.72 LBS | BODY MASS INDEX: 22.11 KG/M2

## 2019-07-31 LAB
GLUCOSE BLDC GLUCOMTR-MCNC: 172 MG/DL (ref 70–130)
GLUCOSE BLDC GLUCOMTR-MCNC: 191 MG/DL (ref 70–130)
GLUCOSE BLDC GLUCOMTR-MCNC: 200 MG/DL (ref 70–130)
GLUCOSE BLDC GLUCOMTR-MCNC: 48 MG/DL (ref 70–130)
GLUCOSE BLDC GLUCOMTR-MCNC: 51 MG/DL (ref 70–130)
GLUCOSE BLDC GLUCOMTR-MCNC: 59 MG/DL (ref 70–130)
GLUCOSE BLDC GLUCOMTR-MCNC: 98 MG/DL (ref 70–130)

## 2019-07-31 PROCEDURE — 97112 NEUROMUSCULAR REEDUCATION: CPT | Performed by: OCCUPATIONAL THERAPIST

## 2019-07-31 PROCEDURE — 74230 X-RAY XM SWLNG FUNCJ C+: CPT

## 2019-07-31 PROCEDURE — 63710000001 INSULIN REGULAR HUMAN PER 5 UNITS: Performed by: NURSE PRACTITIONER

## 2019-07-31 PROCEDURE — 92611 MOTION FLUOROSCOPY/SWALLOW: CPT

## 2019-07-31 PROCEDURE — 82962 GLUCOSE BLOOD TEST: CPT

## 2019-07-31 RX ORDER — ONDANSETRON 4 MG/1
4 TABLET, FILM COATED ORAL EVERY 6 HOURS PRN
Status: CANCELLED | OUTPATIENT
Start: 2019-07-31

## 2019-07-31 RX ORDER — DEXTROSE MONOHYDRATE 25 G/50ML
25 INJECTION, SOLUTION INTRAVENOUS
Status: DISCONTINUED | OUTPATIENT
Start: 2019-07-31 | End: 2019-08-29 | Stop reason: HOSPADM

## 2019-07-31 RX ORDER — NICOTINE POLACRILEX 4 MG
15 LOZENGE BUCCAL
Status: DISCONTINUED | OUTPATIENT
Start: 2019-07-31 | End: 2019-08-29 | Stop reason: HOSPADM

## 2019-07-31 RX ORDER — AMANTADINE HYDROCHLORIDE 100 MG/1
100 CAPSULE, GELATIN COATED ORAL EVERY MORNING
Status: CANCELLED | OUTPATIENT
Start: 2019-08-01

## 2019-07-31 RX ORDER — ATORVASTATIN CALCIUM 80 MG/1
80 TABLET, FILM COATED ORAL NIGHTLY
Status: DISCONTINUED | OUTPATIENT
Start: 2019-07-31 | End: 2019-08-29 | Stop reason: HOSPADM

## 2019-07-31 RX ORDER — ASPIRIN 325 MG
325 TABLET ORAL DAILY
Status: CANCELLED | OUTPATIENT
Start: 2019-08-01

## 2019-07-31 RX ORDER — SODIUM CHLORIDE 0.9 % (FLUSH) 0.9 %
3-10 SYRINGE (ML) INJECTION AS NEEDED
Status: CANCELLED | OUTPATIENT
Start: 2019-07-31

## 2019-07-31 RX ORDER — NITROGLYCERIN 0.4 MG/1
0.4 TABLET SUBLINGUAL
Status: CANCELLED | OUTPATIENT
Start: 2019-07-31

## 2019-07-31 RX ORDER — POTASSIUM CHLORIDE 1.5 G/1.77G
40 POWDER, FOR SOLUTION ORAL AS NEEDED
Status: CANCELLED | OUTPATIENT
Start: 2019-07-31

## 2019-07-31 RX ORDER — DEXTROSE MONOHYDRATE 25 G/50ML
25 INJECTION, SOLUTION INTRAVENOUS
Status: CANCELLED | OUTPATIENT
Start: 2019-07-31

## 2019-07-31 RX ORDER — NICOTINE POLACRILEX 4 MG
15 LOZENGE BUCCAL
Status: CANCELLED | OUTPATIENT
Start: 2019-07-31

## 2019-07-31 RX ORDER — NITROGLYCERIN 0.4 MG/1
0.4 TABLET SUBLINGUAL
Status: DISCONTINUED | OUTPATIENT
Start: 2019-07-31 | End: 2019-08-29 | Stop reason: HOSPADM

## 2019-07-31 RX ORDER — DORZOLAMIDE HCL 20 MG/ML
1 SOLUTION/ DROPS OPHTHALMIC 2 TIMES DAILY
Status: CANCELLED | OUTPATIENT
Start: 2019-07-31

## 2019-07-31 RX ORDER — GLIPIZIDE 10 MG/1
10 TABLET ORAL
Status: CANCELLED | OUTPATIENT
Start: 2019-07-31

## 2019-07-31 RX ORDER — NEBIVOLOL 10 MG/1
20 TABLET ORAL DAILY
Status: DISCONTINUED | OUTPATIENT
Start: 2019-08-01 | End: 2019-08-04

## 2019-07-31 RX ORDER — INSULIN GLARGINE 100 [IU]/ML
10 INJECTION, SOLUTION SUBCUTANEOUS NIGHTLY
Status: CANCELLED | OUTPATIENT
Start: 2019-07-31

## 2019-07-31 RX ORDER — ATORVASTATIN CALCIUM 80 MG/1
80 TABLET, FILM COATED ORAL NIGHTLY
Status: CANCELLED | OUTPATIENT
Start: 2019-07-31

## 2019-07-31 RX ORDER — SODIUM CHLORIDE 0.9 % (FLUSH) 0.9 %
3 SYRINGE (ML) INJECTION EVERY 12 HOURS SCHEDULED
Status: CANCELLED | OUTPATIENT
Start: 2019-07-31

## 2019-07-31 RX ORDER — ASPIRIN 325 MG
325 TABLET ORAL DAILY
Status: DISCONTINUED | OUTPATIENT
Start: 2019-08-01 | End: 2019-08-29 | Stop reason: HOSPADM

## 2019-07-31 RX ORDER — DORZOLAMIDE HCL 20 MG/ML
1 SOLUTION/ DROPS OPHTHALMIC 2 TIMES DAILY
Status: DISCONTINUED | OUTPATIENT
Start: 2019-07-31 | End: 2019-08-29 | Stop reason: HOSPADM

## 2019-07-31 RX ORDER — BRIMONIDINE TARTRATE 2 MG/ML
1 SOLUTION/ DROPS OPHTHALMIC 2 TIMES DAILY
Status: DISCONTINUED | OUTPATIENT
Start: 2019-07-31 | End: 2019-08-29 | Stop reason: HOSPADM

## 2019-07-31 RX ORDER — NEBIVOLOL 10 MG/1
20 TABLET ORAL DAILY
Status: CANCELLED | OUTPATIENT
Start: 2019-08-01

## 2019-07-31 RX ORDER — BRIMONIDINE TARTRATE 2 MG/ML
1 SOLUTION/ DROPS OPHTHALMIC 2 TIMES DAILY
Status: CANCELLED | OUTPATIENT
Start: 2019-07-31

## 2019-07-31 RX ORDER — AMANTADINE HYDROCHLORIDE 100 MG/1
100 CAPSULE, GELATIN COATED ORAL EVERY MORNING
Status: DISCONTINUED | OUTPATIENT
Start: 2019-08-01 | End: 2019-08-11

## 2019-07-31 RX ORDER — ONDANSETRON 2 MG/ML
4 INJECTION INTRAMUSCULAR; INTRAVENOUS EVERY 6 HOURS PRN
Status: CANCELLED | OUTPATIENT
Start: 2019-07-31

## 2019-07-31 RX ADMIN — BARIUM SULFATE 65 ML: 960 POWDER, FOR SUSPENSION ORAL at 10:32

## 2019-07-31 RX ADMIN — NEBIVOLOL HYDROCHLORIDE 20 MG: 10 TABLET ORAL at 12:12

## 2019-07-31 RX ADMIN — GLIPIZIDE 10 MG: 10 TABLET ORAL at 06:01

## 2019-07-31 RX ADMIN — INSULIN HUMAN 5 UNITS: 100 INJECTION, SOLUTION PARENTERAL at 12:11

## 2019-07-31 RX ADMIN — NYSTATIN 500000 UNITS: 100000 SUSPENSION ORAL at 18:05

## 2019-07-31 RX ADMIN — DORZOLAMIDE HYDROCHLORIDE 1 DROP: 20 SOLUTION/ DROPS OPHTHALMIC at 21:10

## 2019-07-31 RX ADMIN — SODIUM CHLORIDE, PRESERVATIVE FREE 3 ML: 5 INJECTION INTRAVENOUS at 12:27

## 2019-07-31 RX ADMIN — BRIMONIDINE TARTRATE 1 DROP: 2 SOLUTION OPHTHALMIC at 21:10

## 2019-07-31 RX ADMIN — AMANTADINE HYDROCHLORIDE 100 MG: 100 CAPSULE ORAL at 06:01

## 2019-07-31 RX ADMIN — BARIUM SULFATE 4 ML: 980 POWDER, FOR SUSPENSION ORAL at 10:33

## 2019-07-31 RX ADMIN — BRIMONIDINE TARTRATE 1 DROP: 2 SOLUTION OPHTHALMIC at 12:20

## 2019-07-31 RX ADMIN — NYSTATIN 500000 UNITS: 500000 SUSPENSION ORAL at 12:13

## 2019-07-31 RX ADMIN — BARIUM SULFATE 50 ML: 400 SUSPENSION ORAL at 10:33

## 2019-07-31 RX ADMIN — ASPIRIN 325 MG: 325 TABLET ORAL at 12:11

## 2019-07-31 RX ADMIN — INSULIN HUMAN 3 UNITS: 100 INJECTION, SOLUTION PARENTERAL at 06:01

## 2019-07-31 RX ADMIN — NYSTATIN 500000 UNITS: 100000 SUSPENSION ORAL at 21:11

## 2019-07-31 RX ADMIN — DORZOLAMIDE HYDROCHLORIDE 1 DROP: 20 SOLUTION/ DROPS OPHTHALMIC at 12:25

## 2019-07-31 RX ADMIN — ATORVASTATIN CALCIUM 80 MG: 80 TABLET, FILM COATED ORAL at 21:10

## 2019-07-31 RX ADMIN — APIXABAN 5 MG: 5 TABLET, FILM COATED ORAL at 12:11

## 2019-07-31 RX ADMIN — APIXABAN 5 MG: 5 TABLET, FILM COATED ORAL at 21:10

## 2019-07-31 RX ADMIN — LOSARTAN POTASSIUM: 50 TABLET, FILM COATED ORAL at 12:12

## 2019-08-01 PROBLEM — I63.9 STROKE (CEREBRUM) (HCC): Status: ACTIVE | Noted: 2019-08-01

## 2019-08-01 LAB
25(OH)D3 SERPL-MCNC: 21.8 NG/ML (ref 30–100)
ABO GROUP BLD: NORMAL
ALBUMIN SERPL-MCNC: 2.9 G/DL (ref 3.5–5.2)
ALBUMIN/GLOB SERPL: 1.3 G/DL
ALP SERPL-CCNC: 107 U/L (ref 39–117)
ALT SERPL W P-5'-P-CCNC: 31 U/L (ref 1–33)
ANION GAP SERPL CALCULATED.3IONS-SCNC: 8.9 MMOL/L (ref 5–15)
AST SERPL-CCNC: 32 U/L (ref 1–32)
BASOPHILS # BLD AUTO: 0.07 10*3/MM3 (ref 0–0.2)
BASOPHILS NFR BLD AUTO: 1 % (ref 0–1.5)
BILIRUB SERPL-MCNC: 0.3 MG/DL (ref 0.2–1.2)
BLD GP AB SCN SERPL QL: NEGATIVE
BUN BLD-MCNC: 23 MG/DL (ref 8–23)
BUN/CREAT SERPL: 33.3 (ref 7–25)
CALCIUM SPEC-SCNC: 9 MG/DL (ref 8.6–10.5)
CHLORIDE SERPL-SCNC: 101 MMOL/L (ref 98–107)
CHOLEST SERPL-MCNC: 105 MG/DL (ref 0–200)
CO2 SERPL-SCNC: 29.1 MMOL/L (ref 22–29)
CREAT BLD-MCNC: 0.69 MG/DL (ref 0.57–1)
DEPRECATED RDW RBC AUTO: 61.4 FL (ref 37–54)
EOSINOPHIL # BLD AUTO: 0.17 10*3/MM3 (ref 0–0.4)
EOSINOPHIL NFR BLD AUTO: 2.5 % (ref 0.3–6.2)
ERYTHROCYTE [DISTWIDTH] IN BLOOD BY AUTOMATED COUNT: 18.8 % (ref 12.3–15.4)
FERRITIN SERPL-MCNC: 82.1 NG/ML (ref 13–150)
FOLATE SERPL-MCNC: 8.07 NG/ML (ref 4.78–24.2)
GFR SERPL CREATININE-BSD FRML MDRD: 83 ML/MIN/1.73
GLOBULIN UR ELPH-MCNC: 2.3 GM/DL
GLUCOSE BLD-MCNC: 123 MG/DL (ref 65–99)
GLUCOSE BLDC GLUCOMTR-MCNC: 133 MG/DL (ref 70–130)
GLUCOSE BLDC GLUCOMTR-MCNC: 177 MG/DL (ref 70–130)
GLUCOSE BLDC GLUCOMTR-MCNC: 286 MG/DL (ref 70–130)
GLUCOSE BLDC GLUCOMTR-MCNC: 83 MG/DL (ref 70–130)
HCT VFR BLD AUTO: 23.6 % (ref 34–46.6)
HCT VFR BLD AUTO: 24.9 % (ref 34–46.6)
HCT VFR BLD AUTO: 27.4 % (ref 34–46.6)
HDLC SERPL-MCNC: 40 MG/DL (ref 40–60)
HEMOCCULT STL QL: POSITIVE
HGB BLD-MCNC: 7.4 G/DL (ref 12–15.9)
HGB BLD-MCNC: 7.8 G/DL (ref 12–15.9)
HGB BLD-MCNC: 8.6 G/DL (ref 12–15.9)
IMM GRANULOCYTES # BLD AUTO: 0.06 10*3/MM3 (ref 0–0.05)
IMM GRANULOCYTES NFR BLD AUTO: 0.9 % (ref 0–0.5)
IRON 24H UR-MRATE: 35 MCG/DL (ref 37–145)
IRON SATN MFR SERPL: 13 % (ref 20–50)
LDLC SERPL CALC-MCNC: 57 MG/DL (ref 0–100)
LDLC/HDLC SERPL: 1.43 {RATIO}
LYMPHOCYTES # BLD AUTO: 1.58 10*3/MM3 (ref 0.7–3.1)
LYMPHOCYTES NFR BLD AUTO: 23 % (ref 19.6–45.3)
MCH RBC QN AUTO: 28.9 PG (ref 26.6–33)
MCHC RBC AUTO-ENTMCNC: 31.4 G/DL (ref 31.5–35.7)
MCV RBC AUTO: 92.2 FL (ref 79–97)
MONOCYTES # BLD AUTO: 0.62 10*3/MM3 (ref 0.1–0.9)
MONOCYTES NFR BLD AUTO: 9 % (ref 5–12)
NEUTROPHILS # BLD AUTO: 4.37 10*3/MM3 (ref 1.7–7)
NEUTROPHILS NFR BLD AUTO: 63.6 % (ref 42.7–76)
NRBC BLD AUTO-RTO: 0.1 /100 WBC (ref 0–0.2)
PLATELET # BLD AUTO: 442 10*3/MM3 (ref 140–450)
PMV BLD AUTO: 9.6 FL (ref 6–12)
POTASSIUM BLD-SCNC: 3.7 MMOL/L (ref 3.5–5.2)
PROT SERPL-MCNC: 5.2 G/DL (ref 6–8.5)
RBC # BLD AUTO: 2.56 10*6/MM3 (ref 3.77–5.28)
RETICS # AUTO: 0.16 10*6/MM3 (ref 0.02–0.13)
RETICS/RBC NFR AUTO: 6.32 % (ref 0.7–1.9)
RH BLD: POSITIVE
SODIUM BLD-SCNC: 139 MMOL/L (ref 136–145)
T&S EXPIRATION DATE: NORMAL
TIBC SERPL-MCNC: 267 MCG/DL (ref 298–536)
TRANSFERRIN SERPL-MCNC: 179 MG/DL (ref 200–360)
TRIGL SERPL-MCNC: 40 MG/DL (ref 0–150)
VIT B12 BLD-MCNC: 1161 PG/ML (ref 211–946)
VLDLC SERPL-MCNC: 8 MG/DL (ref 5–40)
WBC NRBC COR # BLD: 6.87 10*3/MM3 (ref 3.4–10.8)

## 2019-08-01 PROCEDURE — 36430 TRANSFUSION BLD/BLD COMPNT: CPT

## 2019-08-01 PROCEDURE — 86900 BLOOD TYPING SEROLOGIC ABO: CPT | Performed by: PHYSICAL MEDICINE & REHABILITATION

## 2019-08-01 PROCEDURE — 85045 AUTOMATED RETICULOCYTE COUNT: CPT | Performed by: PHYSICAL MEDICINE & REHABILITATION

## 2019-08-01 PROCEDURE — 82962 GLUCOSE BLOOD TEST: CPT

## 2019-08-01 PROCEDURE — 85025 COMPLETE CBC W/AUTO DIFF WBC: CPT | Performed by: PHYSICAL MEDICINE & REHABILITATION

## 2019-08-01 PROCEDURE — 96105 ASSESSMENT OF APHASIA: CPT

## 2019-08-01 PROCEDURE — 97535 SELF CARE MNGMENT TRAINING: CPT

## 2019-08-01 PROCEDURE — 97112 NEUROMUSCULAR REEDUCATION: CPT

## 2019-08-01 PROCEDURE — 80053 COMPREHEN METABOLIC PANEL: CPT | Performed by: PHYSICAL MEDICINE & REHABILITATION

## 2019-08-01 PROCEDURE — 97110 THERAPEUTIC EXERCISES: CPT

## 2019-08-01 PROCEDURE — 82746 ASSAY OF FOLIC ACID SERUM: CPT | Performed by: PHYSICAL MEDICINE & REHABILITATION

## 2019-08-01 PROCEDURE — 63710000001 DIPHENHYDRAMINE PER 50 MG: Performed by: PHYSICAL MEDICINE & REHABILITATION

## 2019-08-01 PROCEDURE — 85018 HEMOGLOBIN: CPT | Performed by: PHYSICAL MEDICINE & REHABILITATION

## 2019-08-01 PROCEDURE — 86900 BLOOD TYPING SEROLOGIC ABO: CPT

## 2019-08-01 PROCEDURE — 97166 OT EVAL MOD COMPLEX 45 MIN: CPT

## 2019-08-01 PROCEDURE — 86850 RBC ANTIBODY SCREEN: CPT | Performed by: PHYSICAL MEDICINE & REHABILITATION

## 2019-08-01 PROCEDURE — 86901 BLOOD TYPING SEROLOGIC RH(D): CPT | Performed by: PHYSICAL MEDICINE & REHABILITATION

## 2019-08-01 PROCEDURE — 63710000001 INSULIN REGULAR HUMAN PER 5 UNITS: Performed by: PHYSICAL MEDICINE & REHABILITATION

## 2019-08-01 PROCEDURE — 82272 OCCULT BLD FECES 1-3 TESTS: CPT | Performed by: PHYSICAL MEDICINE & REHABILITATION

## 2019-08-01 PROCEDURE — 82607 VITAMIN B-12: CPT | Performed by: PHYSICAL MEDICINE & REHABILITATION

## 2019-08-01 PROCEDURE — 80061 LIPID PANEL: CPT | Performed by: PHYSICAL MEDICINE & REHABILITATION

## 2019-08-01 PROCEDURE — 85014 HEMATOCRIT: CPT | Performed by: PHYSICAL MEDICINE & REHABILITATION

## 2019-08-01 PROCEDURE — 82728 ASSAY OF FERRITIN: CPT | Performed by: PHYSICAL MEDICINE & REHABILITATION

## 2019-08-01 PROCEDURE — P9016 RBC LEUKOCYTES REDUCED: HCPCS

## 2019-08-01 PROCEDURE — 83540 ASSAY OF IRON: CPT | Performed by: PHYSICAL MEDICINE & REHABILITATION

## 2019-08-01 PROCEDURE — 82306 VITAMIN D 25 HYDROXY: CPT | Performed by: PHYSICAL MEDICINE & REHABILITATION

## 2019-08-01 PROCEDURE — 97161 PT EVAL LOW COMPLEX 20 MIN: CPT

## 2019-08-01 PROCEDURE — 86923 COMPATIBILITY TEST ELECTRIC: CPT

## 2019-08-01 PROCEDURE — 84466 ASSAY OF TRANSFERRIN: CPT | Performed by: PHYSICAL MEDICINE & REHABILITATION

## 2019-08-01 RX ORDER — NICOTINE POLACRILEX 4 MG
15 LOZENGE BUCCAL
Status: DISCONTINUED | OUTPATIENT
Start: 2019-08-01 | End: 2019-08-01

## 2019-08-01 RX ORDER — GLIPIZIDE 5 MG/1
5 TABLET ORAL
Status: DISCONTINUED | OUTPATIENT
Start: 2019-08-01 | End: 2019-08-19

## 2019-08-01 RX ORDER — DIPHENHYDRAMINE HCL 25 MG
25 CAPSULE ORAL ONCE
Status: COMPLETED | OUTPATIENT
Start: 2019-08-01 | End: 2019-08-01

## 2019-08-01 RX ORDER — DEXTROSE MONOHYDRATE 25 G/50ML
25 INJECTION, SOLUTION INTRAVENOUS
Status: DISCONTINUED | OUTPATIENT
Start: 2019-08-01 | End: 2019-08-01

## 2019-08-01 RX ORDER — PANTOPRAZOLE SODIUM 40 MG/1
40 TABLET, DELAYED RELEASE ORAL
Status: DISCONTINUED | OUTPATIENT
Start: 2019-08-01 | End: 2019-08-02

## 2019-08-01 RX ORDER — ACETAMINOPHEN 325 MG/1
650 TABLET ORAL ONCE
Status: COMPLETED | OUTPATIENT
Start: 2019-08-01 | End: 2019-08-01

## 2019-08-01 RX ORDER — ERGOCALCIFEROL 1.25 MG/1
50000 CAPSULE ORAL
Status: DISCONTINUED | OUTPATIENT
Start: 2019-08-01 | End: 2019-08-29

## 2019-08-01 RX ORDER — GLIPIZIDE 10 MG/1
10 TABLET ORAL
Status: DISCONTINUED | OUTPATIENT
Start: 2019-08-01 | End: 2019-08-01

## 2019-08-01 RX ADMIN — DIPHENHYDRAMINE HYDROCHLORIDE 25 MG: 25 CAPSULE ORAL at 16:00

## 2019-08-01 RX ADMIN — BRIMONIDINE TARTRATE 1 DROP: 2 SOLUTION OPHTHALMIC at 20:16

## 2019-08-01 RX ADMIN — BRIMONIDINE TARTRATE 1 DROP: 2 SOLUTION OPHTHALMIC at 09:14

## 2019-08-01 RX ADMIN — ATORVASTATIN CALCIUM 80 MG: 80 TABLET, FILM COATED ORAL at 20:16

## 2019-08-01 RX ADMIN — NEBIVOLOL HYDROCHLORIDE 20 MG: 10 TABLET ORAL at 09:13

## 2019-08-01 RX ADMIN — ASPIRIN 325 MG: 325 TABLET ORAL at 09:14

## 2019-08-01 RX ADMIN — DORZOLAMIDE HYDROCHLORIDE 1 DROP: 20 SOLUTION/ DROPS OPHTHALMIC at 20:16

## 2019-08-01 RX ADMIN — AMANTADINE HYDROCHLORIDE 100 MG: 100 CAPSULE ORAL at 06:27

## 2019-08-01 RX ADMIN — INSULIN HUMAN 8 UNITS: 100 INJECTION, SOLUTION PARENTERAL at 12:26

## 2019-08-01 RX ADMIN — NYSTATIN 500000 UNITS: 100000 SUSPENSION ORAL at 17:27

## 2019-08-01 RX ADMIN — ERGOCALCIFEROL 50000 UNITS: 1.25 CAPSULE ORAL at 17:23

## 2019-08-01 RX ADMIN — PANTOPRAZOLE SODIUM 40 MG: 40 TABLET, DELAYED RELEASE ORAL at 14:38

## 2019-08-01 RX ADMIN — GLIPIZIDE 5 MG: 5 TABLET ORAL at 17:27

## 2019-08-01 RX ADMIN — APIXABAN 5 MG: 5 TABLET, FILM COATED ORAL at 20:16

## 2019-08-01 RX ADMIN — DORZOLAMIDE HYDROCHLORIDE 1 DROP: 20 SOLUTION/ DROPS OPHTHALMIC at 09:13

## 2019-08-01 RX ADMIN — NYSTATIN 500000 UNITS: 100000 SUSPENSION ORAL at 20:16

## 2019-08-01 RX ADMIN — NYSTATIN 500000 UNITS: 100000 SUSPENSION ORAL at 14:28

## 2019-08-01 RX ADMIN — APIXABAN 5 MG: 5 TABLET, FILM COATED ORAL at 09:13

## 2019-08-01 RX ADMIN — ACETAMINOPHEN 650 MG: 325 TABLET, FILM COATED ORAL at 16:00

## 2019-08-01 RX ADMIN — LOSARTAN POTASSIUM: 50 TABLET, FILM COATED ORAL at 09:13

## 2019-08-01 RX ADMIN — NYSTATIN 500000 UNITS: 100000 SUSPENSION ORAL at 09:13

## 2019-08-01 NOTE — PROGRESS NOTES
Inpatient Rehabilitation Functional Measures Assessment and Plan of Care    Plan of Care  Updated Problems/Interventions  Mobility    [PT] Bed/Chair/Wheelchair(Active)  Current Status(08/01/2019): Mod A  Weekly Goal(08/08/2019): Min A  Discharge Goal: CGA    [PT] Walk(Active)  Current Status(08/01/2019): 80` HHA, Min A  Weekly Goal(08/08/2019): to and from BR, CGA  Discharge Goal: 160` CGA    Functional Measures  KATI Eating:  [facility] Grooming: [facility] Bathing:  [facility] Upper Body Dressing:  [facility] Lower Body Dressing:  [facility] Toileting:  [facility] Bladder Management  Level of Assistance:  [place]  Frequency/Number of Accidents this Shift:  [facility] Bowel Management  Level of Assistance: [place]  Frequency/Number of Accidents this Shift: [facility] Bed/Chair/Wheelchair Transfer:  Bed/chair/wheelchair Transfer Score = 3.  Patient performs 50-74% of effort and requires moderate assistance (some  lifting) for transferring to and from the bed/chair/wheelchair. No assistive  devices were required.  [facility] Toilet Transfer:  [facility] Tub/Shower Transfer:  [place]    Previously Documented Mode of Locomotion at Discharge: Field  KATI Expected Mode of Locomotion at Discharge: [facility] Walk/Wheelchair:  WHEELCHAIR OBSERVATION   Activity was not observed.    WALK OBSERVATION   Walk Distance Scale = 2.  Distance walked is 50 -149 feet. Walk Score = 2.  Patient performs 75% or more of effort and requires minimal assistance.  Incidental assistance, contact guard or steadying was provided. Patient walked a  distance of 80 feet. No assistive devices were required.  [facility] Stairs:  Activity was not observed.    KATI Comprehension:  [facility] Expression:  [facility] Social Interaction:  [facility] Problem Solving:  [facility] Memory:  [place]    Therapy Mode Minutes  Occupational Therapy: [place]  Physical Therapy: Individual: 60 minutes.  Speech Language Pathology:  [place]    Signed by: Mira Chadwick  PT

## 2019-08-01 NOTE — PROGRESS NOTES
Inpatient Rehabilitation Functional Measures Assessment    Functional Measures  KATI Eating:  [facility] Grooming: [facility] Bathing:  [facility] Upper Body Dressing:  [facility] Lower Body Dressing:  [facility] Toileting:  [facility] Bladder Management  Level of Assistance:  [place]  Frequency/Number of Accidents this Shift:  [facility] Bowel Management  Level of Assistance: [place]  Frequency/Number of Accidents this Shift: [facility] Bed/Chair/Wheelchair Transfer:  [facility] Toilet Transfer:  [facility] Tub/Shower Transfer:  [place]    Previously Documented Mode of Locomotion at Discharge: Field  KATI Expected Mode of Locomotion at Discharge: [facility] Walk/Wheelchair:  [facility] Stairs:  [facility] Comprehension:  Auditory comprehension is the usual mode. Patient does not  comprehend complex/abstract information in their primary language without  assistance from a helper. Comprehension Score = 2, Maximal Prompting. Patient  comprehends basic daily needs 25-49% of the time. Patient understands simple  information via single words or gestures. Requires maximal/a lot of prompting  (most of the time). No assistive devices were required.  KATI Expression:  Vocal expression is the usual mode. Patient does not express  complex/abstract information in their primary language without a helper.  Expression Score = 1, Total Assistance. Patient expresses basic daily needs less  than 25% of the time, or does not express basic needs appropriately or  consistently despite prompting. No assistive devices were required.  KATI Social Interaction:  Activity was not observed.  [facility]ATI Problem Solving:  Activity was not observed.  KATI Memory:  Activity was not observed.    Therapy Mode Minutes  Occupational Therapy: [place]  Physical Therapy: [place]  Speech Language Pathology:  [place]    Signed by: Nicanor Dimas RN

## 2019-08-01 NOTE — PROGRESS NOTES
Inpatient Rehabilitation Functional Measures Assessment and Plan of Care    Plan of Care  Updated Problems/Interventions  Field    Functional Measures  KATI Eating:  Activity was not observed.  KATI Grooming: [facility] Bathing:  [facility] Upper Body Dressing:  [facility] Lower Body Dressing:  [facility] Toileting:  [facility] Bladder Management  Level of Assistance:  [place]  Frequency/Number of Accidents this Shift:  [facility] Bowel Management  Level of Assistance: [place]  Frequency/Number of Accidents this Shift: [facility] Bed/Chair/Wheelchair Transfer:  [facility] Toilet Transfer:  [facility] Tub/Shower Transfer:  [place]    Previously Documented Mode of Locomotion at Discharge: Field  KATI Expected Mode of Locomotion at Discharge: [facility] Walk/Wheelchair:  [facility] Stairs:  [facility] Comprehension:  Auditory comprehension is the usual mode. Patient does not  comprehend complex/abstract information in their primary language without  assistance from a helper. Comprehension Score = 1, Total Assistance.  Patient  understands basic daily needs less than 25% of the time and/or does not  understand simple information such as single words or gestures. No assistive  devices were required.  KATI Expression:  Vocal expression is the usual mode. Patient does not express  complex/abstract information in their primary language without a helper.  Expression Score = 1, Total Assistance. Patient expresses basic daily needs less  than 25% of the time, or does not express basic needs appropriately or  consistently despite prompting. No assistive devices were required.  KATI Social Interaction:  Social Interaction Score = 5, Supervision.  Patient may  require encouragement to initiate participation. Patient requires directing only  under stressful or unfamiliar conditions, but less than 10% of the time, for the  following behavior(s):  [facility] Problem Solving:  Activity was not observed.  KATI Memory:  Activity  was not observed.    Therapy Mode Minutes  Occupational Therapy: [place]  Physical Therapy: [place]  Speech Language Pathology:  Individual: 60 minutes.    Signed by: Katherine Bruton, SLP

## 2019-08-01 NOTE — PROGRESS NOTES
SECTION GG    Mobility Performance:     Roll Left and Right: Not attempted due to medical or safety concerns.   Sit to Lying: [place] does less than half the effort. [place] lifts, holds or  supports trunk or limbs but provides less than half the effort.   Lying to Sitting on Side of Bed: [place] does more than half the effort. [place]  lifts or holds trunk or limbs and provides more than half the effort.   Sit to Stand: [place] does less than half the effort. [place] lifts, holds or  supports trunk or limbs but provides less than half the effort.   Chair/Bed to Chair Transfer: [place] does more than half the effort. [place]  lifts or holds trunk or limbs and provides more than half the effort.   Car Transfer: Not attempted due to medical or safety concerns.   Walk 10 Feet:   [place] does less than half the effort. [place] lifts, holds or  supports trunk or limbs but provides less than half the effort.  Walk 50 Feet with 2 Turns:   [place] does less than half the effort. [place]  lifts, holds or supports trunk or limbs but provides less than half the effort.  Walk 150 Feet:   Not attempted due to medical or safety concerns.  Walking 10 Feet on Uneven Surfaces:   Not attempted due to medical or safety  concerns.  1 Step Over Curb or Up/Down Stair:   Not attempted due to medical or safety  concerns.  Picking up an Object:   Not attempted due to medical or safety concerns.  Uses Wheelchair/Scooter: No    Mobility Discharge Goals:   Sit to Stand: [place] provides verbal cues or touching/steadying assistance as  patient completes activity.    Signed by: Mira Chadwick, PT

## 2019-08-01 NOTE — PLAN OF CARE
Problem: Skin Injury Risk (Adult)  Goal: Skin Health and Integrity  Outcome: Ongoing (interventions implemented as appropriate)      Problem: Fall Risk (Adult)  Goal: Absence of Fall  Outcome: Ongoing (interventions implemented as appropriate)      Problem: Patient Care Overview  Goal: Plan of Care Review  Outcome: Ongoing (interventions implemented as appropriate)   08/01/19 0306   Patient Care Overview   IRF Plan of Care Review progress ongoing, continue   Progress, Functional Goals demonstrating adequate progress   Coping/Psychosocial   Plan of Care Reviewed With patient   OTHER   Outcome Summary Pt rested well this shift. Unable to fully comprehend/respond appropriately to questions. Some understandable words- mostly inappropriate. No signs of impulsivity this shift. Inc bladder this shift.        Problem: Stroke (IRF) (Adult)  Goal: Promote Optimal Functional [place]  Outcome: Ongoing (interventions implemented as appropriate)

## 2019-08-01 NOTE — PROGRESS NOTES
Discharge Planning Assessment  [facility]     Patient Name: Darby Workman  MRN: [id number]  Today's Date: 8/1/2019    Admit Date: 7/31/2019    Discharge Needs Assessment     Row Name 08/01/19 1635       Living Environment    Lives With  child(sahil), adult;spouse    Name(s) of Who Lives With Patient  , Ajit Workman and 42 year old son, Ismael Workman    Current Living Arrangements  home/apartment/condo    Primary Care Provided by  spouse/significant other    Provides Primary Care For  -- son has bipolar disorder, pt functioned as an emotional support to her son as well as maintaining the home    Caregiving Concerns      Family Caregiver if Needed  spouse    Family Caregiver Names  Ajit Workman, daughter Katerine     Quality of Family Relationships  involved;supportive    Able to Return to Prior Arrangements  yes       Resource/Environmental Concerns    Resource/Environmental Concerns  home accessibility    Home Accessibility Concerns  stairs to access bedroom or bathroom;stairs to enter home    Transportation Concerns  car, none       Transition Planning    Patient/Family Anticipates Transition to  home with family    Patient/Family Anticipated Services at Transition  rehabilitation services;durable medical equipment    Transportation Anticipated  family or friend will provide       Discharge Needs Assessment    Concerns to be Addressed  cognitive/perceptual;care coordination/care conferences;discharge planning    Equipment Currently Used at Home  none    Anticipated Changes Related to Illness  inability to care for self;inability to care for someone else    Equipment Needed After Discharge  -- to be determined    Outpatient/Agency/Support Group Needs  -- to be determined    Current Discharge Risk  cognitively impaired;dependent with mobility/activities of daily living        Discharge Plan     Row Name 08/01/19 0251       Plan    Plan  Home with  and son    Patient/Family in Agreement with Plan  yes     Plan Comments  Assessment completed with pt and pt's . Explained rehab program and SW role. History obtained from pt's .   Pt lives with her  and son in a 2 story home, bed and full bath on the 2nd level.  Pt has 2 children, a son and a daughter. Daughter lives in [place] and has 2 young children.   Prior to admission, pt functioned independently at home and in the community.  She is very close to her daughter and is normally active with her grandchildren.  Pt's  works full time, but states he may cut back on his work in order to provide care for his wife.  Pt's son lives in the home but would not be a reliable caregiver.          Destination      No service coordination in this encounter.      Durable Medical Equipment      No service coordination in this encounter.      Dialysis/Infusion      No service coordination in this encounter.      Home Medical Care      No service coordination in this encounter.      Therapy      No service coordination in this encounter.      Community Resources      No service coordination in this encounter.          Demographic Summary     Row Name 08/01/19 2021       General Information    Admission Type  inpatient    Arrived From  hospital    Referral Source  hospital clinician/department    Reason for Consult  care coordination/care conference;discharge planning    Preferred Language  English       Contact Information    Permission Granted to Share Info With  family/designee        Functional Status     Row Name 08/01/19 162       Functional Status    Usual Activity Tolerance  good    Current Activity Tolerance  fair       Functional Status, IADL    Medications  independent    Meal Preparation  independent    Housekeeping  independent    Laundry  independent    Shopping  independent       Mental Status    General Appearance WDL  WDL       Mental Status Summary    Recent Changes in Mental Status/Cognitive Functioning  unable to assess        Employment/    Employment Status  homemaker        Psychosocial     Row Name 08/01/19 1627       Values/Beliefs    Spiritual, Cultural Beliefs, Restoration Practices, Values that Affect Care  no    Values/Beliefs Comment  Pt is an active member of [employer]       Behavior WDL    Behavior WDL  WDL    Interactions  cooperative       Emotion Mood WDL    Emotion/Mood/Affect WDL  -- unable to assess,does not appear to be in distress       Speech WDL    Speech WDL  speech    Speech  -- aphasia       Intellectual Performance WDL    Level of Consciousness  Alert       Coping/Stress    Major Change/Loss/Stressor  loss of independence;hospitalization    Patient Personal Strengths  strong support system;resilient by 's report    Sources of Support  adult child(sahil);[employer] organization    Techniques to [place] with Loss/Stress/Change  diversional activities    Reaction to Health Status  -- unable to assess    Understanding of Condition and Treatment  unable to assess       Developmental Stage (Eriksson's)    Developmental Stage  Stage 8 (64 years-death/Late Adulthood) Integrity vs. Despair        Abuse/Neglect    No documentation.       Legal    No documentation.       Substance Abuse     Row Name 08/01/19 1630       Substance Use    Substance Use Comment   reports pt does not use alcohol or tobacco        Patient Forms    No documentation.           Sheryl Adams

## 2019-08-01 NOTE — PROGRESS NOTES
Inpatient Rehabilitation Functional Measures Assessment    Functional Measures  KATI Eating:  [facility] Grooming: [facility] Bathing:  [facility] Upper Body Dressing:  [facility] Lower Body Dressing:  [facility] Toileting:  [facility] Bladder Management  Level of Assistance:  [place]  Frequency/Number of Accidents this Shift:  [facility] Bowel Management  Level of Assistance: [place]  Frequency/Number of Accidents this Shift: [facility] Bed/Chair/Wheelchair Transfer:  [facility] Toilet Transfer:  [facility] Tub/Shower Transfer:  [place]    Previously Documented Mode of Locomotion at Discharge: Field  KATI Expected Mode of Locomotion at Discharge: [facility] Walk/Wheelchair:  [facility] Stairs:  [facility] Comprehension:  Auditory comprehension is the usual mode. Patient does not  comprehend complex/abstract information in their primary language without  assistance from a helper. Comprehension Score = 2, Maximal Prompting. Patient  comprehends basic daily needs 25-49% of the time. Patient understands simple  information via single words or gestures. Requires maximal/a lot of prompting  (most of the time). Patient requires the following assistive device(s): Glasses.    KATI Expression:  Vocal expression is the usual mode. Patient does not express  complex/abstract information in their primary language without a helper.  Expression Score = 2, Maximal Prompting. Patient expresses basic daily needs  25-49% of the time. Patient uses only single words or gestures and requires  maximal/a lot of prompting (most of the time). No assistive devices were  required.  KATI Social Interaction:  Activity was not observed.  KATI Problem Solving:  Activity was not observed.  KATI Memory:  Activity was not observed.    Therapy Mode Minutes  Occupational Therapy: [place]  Physical Therapy: [place]  Speech Language Pathology:  [place]    Signed by: Leslie Mcdonald RN

## 2019-08-01 NOTE — PROGRESS NOTES
Occupational Therapy: Individual: 60 minutes.    Physical Therapy: [place]    Speech Language Pathology:  [place]    Signed by: Ingris Ansari OT

## 2019-08-01 NOTE — PLAN OF CARE
"Problem: Patient Care Overview  Goal: Plan of Care Review  Outcome: Ongoing (interventions implemented as appropriate)   08/01/19 1614   Patient Care Overview   IRF Plan of Care Review progress ongoing, continue   Progress, Functional Goals demonstrating adequate progress   Coping/Psychosocial   Plan of Care Reviewed With patient   OTHER   Outcome Summary Evaluation of language skills was initiated this date. The Western Aphasia Battery was attempted but could not be completed d/t the severity of this patient's aphasia. She presents with severe global aphasia, characterized by poor comprehension of single words, simple questions, and basic commands as well as severely limited neologistic and perseverative verbal output. Elicitation of some basic receptive and expressive skills could be achieved with direct imitation but even this was difficult for her. She was able to imitate vowels and isolated oral postures. Functional words/phrases were intelligible a few times throughout the evaluation sessions, including \"fine\" and \"I don't know\". Vocal intensity is poor, and she was not able to increase intensity despite maximal cues. She was not able to state her name nor write her name given written prompt or direct written model. Minimal success noted with automatic speech tasks. Speech therapy is indicated at this time to increase functional communication, continue diagnostic treatment for cognition, and monitor diet tolerance.          "

## 2019-08-01 NOTE — PROGRESS NOTES
"With patient's anemia HGB 7.4-7.8 today and BP lower this afternoon, will transfuse 1 unit PRBCs to help with perfusion s/p stroke.   Added parameters to hold Hyzaar and Bystolic if systolic BP < 110. Recheck HGB in AM  Reviewed with patient's .    BP range past 3 days  99/47 97/50 123/54 129/51 111/56 116/78 127/55 139/94 120/59 --   163/74 132/65 145/57 168/83 160/86 173/76 129/62 99/53    BP lower this afternoon. More alert when back in bed.  BP 99/53 (BP Location: Left arm, Patient Position: Lying)   Pulse 74   Temp 97.3 °F (36.3 °C) (Oral)   Resp 20   Ht 165.1 cm (65\")   Wt 60.9 kg (134 lb 3.2 oz)   SpO2 96%   BMI 22.33 kg/m²         Repeat lab today -         Ref. Range 8/1/2019 14:25   Hemoglobin Latest Ref Range: 12.0 - 15.9 g/dL 7.8 (L)   Hematocrit Latest Ref Range: 34.0 - 46.6 % 24.9 (L)       Ref. Range 8/1/2019 06:48   Reticulocyte % Latest Ref Range: 0.70 - 1.90 % 6.32 (H)      Ref. Range 8/1/2019 06:47   Iron Latest Ref Range: 37 - 145 mcg/dL 35 (L)   Ferritin Latest Ref Range: 13.00 - 150.00 ng/mL 82.10   Iron Saturation Latest Ref Range: 20 - 50 % 13 (L)   Transferrin Latest Ref Range: 200 - 360 mg/dL 179 (L)   TIBC Latest Ref Range: 298 - 536 mcg/dL 267 (L)     "

## 2019-08-01 NOTE — PROGRESS NOTES
Inpatient Rehabilitation Plan of Care Note    Plan of Care  Care Plan Reviewed - No updates at this time.    Psychosocial    Performed Intervention(s)  Assist patient to express needs and concerns      Safety    Performed Intervention(s)  Bed alarm, wc alarm  Safety rounds  Items within reach      Body Systems    Performed Intervention(s)  Daily skin inspection      Sphincter Control    Performed Intervention(s)  Monitor intake and output  Encourage fluid intake  Elimination schedule    Signed by: Leslie Mcdonald RN

## 2019-08-01 NOTE — CONSULTS
Adult Nutrition  Assessment/PES    Patient Name:  Darby Workman  YOB: 1945  MRN: [id number]  Admit Date:  7/31/2019    Assessment Date:  8/1/2019    Comments:  Rehab admission assessment    Transfers from our acute neuro floor, s/p L carotid artery stent placement due to severe stenosis and hx/o multiple strokes. Due to post-op dysphagia & altered mental status, she required nasoenteric feeding tube placement which she pulled out numerous times. VFSS on 7/31 - able to advance to oral diet. She's eaten well >75% since admitted to rehab. Needs set up and feeding assistance. Added magic cup shakes to meals TID given recent weight loss (approx 50-10 lb/2 mos) and to ^ fluid intake. Will continue to monitor status.     Reason for Assessment     Row Name 08/01/19 0936          Reason for Assessment    Reason For Assessment  nurse/nurse practitioner consult     Diagnosis  neurologic conditions;diabetes diagnosis/complications;cardiac disease CVA, stenosis of L carotid artery; hx previous strokes, diabetes, HTN, CAD     Identified At Risk by Screening Criteria  difficulty chewing/swallowing         Nutrition/Diet History     Row Name 08/01/19 0937          Nutrition/Diet History    Typical Food/Fluid Intake  Pt's diet just advanced yesterday from NPO after VFSS. She'd been on nasoenteric TF for >10 days. Pulled numerous tubes out herself. She has eaten well at the last 2 meals. Needs assist with set up & feeding.     Factors Affecting Nutritional Intake  impaired cognitive status/motor control;difficulty/impaired swallowing         Anthropometrics     Row Name 08/01/19 0938          Admit Weight    Admit Weight  60.8 kg (134 lb)        Usual Body Weight (UBW)    Usual Body Weight  63.5 kg (140 lb)   May 2019     Weight Loss  unintentional     Weight Loss Time Frame  5-10 lb over 2 mos        Body Mass Index (BMI)    BMI Assessment  BMI 18.5-24.9: normal         Labs/Tests/Procedures/Meds     Row Name  08/01/19 0938          Labs/Procedures/Meds    Lab Results Reviewed  reviewed     Lab Results Comments  glu, Alb, h/h        Diagnostic Tests/Procedures    Diagnostic Test/Procedure Reviewed  reviewed     Diagnostic Test/Procedures Comments  7/31 VFSS; 7/17/2019 left internal carotid artery stent        Medications    Pertinent Medications Reviewed  reviewed     Pertinent Medications Comments  amantadine, insulin, nystatin         Physical Findings     Row Name 08/01/19 0940          Physical Findings    Overall Physical Appearance  other (see comments) aphasic     Tubes  -- feeding tube pulled last night     Skin  other (see comments) L neck incision         Estimated/Assessed Needs     Row Name 08/01/19 0943          Calculation Measurements    Weight Used For Calculations  60.9 kg (134 lb 4.2 oz)        Estimated/Assessed Needs    Additional Documentation  Protein Requirements (Group);Fluid Requirements (Group);KCAL/KG (Group)        KCAL/KG    KCAL/KG  25 Kcal/Kg (kcal);30 Kcal/Kg (kcal)     25 Kcal/Kg (kcal)  1522.5     30 Kcal/Kg (kcal)  1827        Protein Requirements    Est Protein Requirement Amount (gms/kg)  1.0 gm protein     Estimated Protein Requirements (gms/day)  60.9        Fluid Requirements    Estimated Fluid Requirements (mL/day)  1522     Estimated Fluid Requirement Method  RDA Method     RDA Method (mL)  1522     Dylon-Segar Method (over 20 kg)      Nutrition Prescription Ordered     Row Name 08/01/19 0944          Nutrition Prescription PO    Current PO Diet  Dysphagia     Dysphagia Level  2  Pureed     Fluid Consistency  Honey thick         Evaluation of Received Nutrient/Fluid Intake     Row Name 08/01/19 0944 08/01/19 0930       Calculation Measurements    Weight Used For Calculations  --  60.9 kg (134 lb 4.2 oz)       PO Evaluation    Number of Meals  2  --    % PO Intake  75% (dinner)-100% (breakfast)  --      Problem/Interventions:  Problem 1     Row Name 08/01/19 0906           Nutrition Diagnoses Problem 1    Problem 1  Swallowing Difficulty     Etiology (related to)  Medical Diagnosis     Neurological  AMS;CVA;Dysphagia     Signs/Symptoms (evidenced by)  SLP/Swallow eval     Swallow eval status  Done     Type of SLP Evaluation  VFSS                 Intervention Goal     Row Name 08/01/19 0954          Intervention Goal    General  Maintain nutrition;Disease management/therapy;Meet nutritional needs for age/condition     PO  Tolerate PO;Maintain intake;PO intake (%)     PO Intake %  75 %     Weight  Maintain weight         Nutrition Intervention     Row Name 08/01/19 0953          Nutrition Intervention    RD/Tech Action  Follow Tx progress;Care plan reviewd;Encourage intake;Recommend/ordered     Recommended/Ordered  Supplement         Nutrition Prescription     Row Name 08/01/19 0968          Nutrition Prescription PO    PO Prescription  Begin/change supplement     Supplement  Magic Cup;Milkshake     Supplement Frequency  3 times a day     New PO Prescription Ordered?  Yes         Education/Evaluation     Row Name 08/01/19 0902          Education    Education  Will Instruct as appropriate        Monitor/Evaluation    Monitor  Per protocol           Electronically signed by:  Renate Clifford RD  08/01/19 9:47 AM

## 2019-08-01 NOTE — THERAPY EVALUATION
Inpatient Rehabilitation - Speech Language Pathology Initial Evaluation    [facility]     Patient Name: Darby Workman  : 1945  MRN: [id number]  Today's Date: 2019     Admit Date: 2019  Visit Dx:  No diagnosis found.    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)   • Right-sided headache   • Acute ischemic stroke (CMS/HCC)   • Embolic stroke (CMS/HCC)   • Cerebral infarction due to stenosis of left carotid artery (CMS/HCC)   • Anticoagulated by anticoagulation treatment   • Stroke (cerebrum) (CMS/HCC)     Past Medical History:   Diagnosis Date   • Acute cerebrovascular accident (CVA) of cerebellum (CMS/HCC)    • Acute ischemic stroke (CMS/HCC)    • Arthritis    • Coronary artery disease    • CVA (cerebral vascular accident) (CMS/HCC)    • Diabetes mellitus (CMS/HCC)    • Heart attack (CMS/HCC)    • History of blood clots    • Hyperlipidemia    • Hypertension    • Vision loss      Past Surgical History:   Procedure Laterality Date   • CAROTID ENDARTERECTOMY Left 2019    Procedure: Left carotid endarterectomy;  Surgeon: Rupert Oliva MD;  Location: [facility];  Service: Neurosurgery   • EMBOLECTOMY Left 2019    Procedure: CEREBRAL ANGIOGRAM, LEFT INTERNAL CAROTID ARTERY STENT;  Surgeon: Rupert Oliva MD;  Location: [facility] ;  Service: Neurosurgery        SLP EVALUATION (last 72 hours)      SLP SLC Evaluation     Row Name 19 1230                   Communication Assessment/Intervention    Document Type  evaluation  -KB        Subjective Information  no complaints  -KB        Patient Observations  alert;cooperative;agree to therapy  -KB        Patient/Family Observations  seated in wc in dining room after lunch; no family present  -KB        Patient Effort  adequate  -KB         Symptoms Noted During/After Treatment  none  -KB           General Information    Patient Profile Reviewed  yes  -KB        Pertinent History Of Current Problem  Pt s/p CVA after L carotid endarterectomy  -KB        Precautions/Limitations, Vision  WFL with corrective lenses  -KB        Precautions/Limitations, Hearing  WFL;for purposes of eval  -KB        Patient Level of Education  unknown  -KB        Prior Level of Function-Communication  -- unknown  -KB        Plans/Goals Discussed with  patient poor comprehension  -KB        Barriers to Rehab  cognitive status  -KB        Patient's Goals for Discharge  patient could not state  -KB        Standardized Assessment Used  Western Aphasia Battery attempted but not completed d/t severity  -KB           Pain Scale: FACES Pre/Post-Treatment    Pain: FACES Scale, Pretreatment  0-->no hurt  -KB        Pain: FACES Scale, Post-Treatment  0-->no hurt  -KB           Comprehension Assessment/Intervention    Comprehension Assessment/Intervention  Auditory Comprehension  -KB           Auditory Comprehension Assessment/Intervention    Auditory Comprehension (Communication)  severe impairment  -KB        Able to Identify Objects/Pictures (Communication)  severe impairment;body part;familiar objects letters, numbers, shapes, colors- 0%  -KB        Answers Questions (Communication)  severe impairment;simple;yes/no;personal yes/no- 55%  -KB        Able to Follow Commands (Communication)  severe impairment;1-step 0%; 1/4 with direct imitation and max cues  -KB        Narrative Discourse  unable/difficult to assess  -KB           Expression Assessment/Intervention    Expression Assessment/Intervention  verbal expression  -KB           Verbal Expression Assessment/Intervention    Verbal Expression  severe impairment  -KB        Automatic Speech (Communication)  -- 1-10- 20% on 3rd try c max cues; HBD aprx. 3 wds. max cues   -KB        Repetition  mild impairment;sounds;severe  "impairment;words;perseverations;neologisms 5/5 vowels; 1/5 CV syllables; 4/20 words  -KB        Phrase Completion  severe impairment;automatic/predictable 1/5  -KB        Responsive Naming  unable/difficult to assess  -KB        Confrontational Naming  severe impairment;high frequency;perseverations;neologisms  -KB        Spontaneous/Functional Words  severe impairment;perseverations;neologisms  -KB        Sentence Formulation  unable/difficult to assess  -KB        Conversational Discourse/Fluency  unable/difficult to assess  -KB        Verbal Expression, Comment  Evaluation of language skills was initiated this date. The Western Aphasia Battery was attempted but could not be completed d/t the severity of this patient's aphasia. She presents with severe global aphasia, characterized by poor comprehension of single words, simple questions, and basic commands as well as severely limited neologistic and perseverative verbal output. Elicitation of some basic receptive and expressive skills could be achieved with direct imitation but even this was difficult for her. She was able to imitate vowels and isolated oral postures. Functional words/phrases were intelligible a few times throughout the evaluation sessions, including \"fine\" and \"I don't know\". Vocal intensity is poor, and she was not able to increase intensity despite maximal cues. She was not able to state her name nor write her name given written prompt or direct written model. Minimal success noted with automatic speech tasks. Speech therapy is indicated at this time to increase functional communication, continue diagnostic treatment for cognition, and monitor diet tolerance.   -KB           Motor Speech Assessment/Intervention    Characteristics Consistent with Dysarthria  decreased intensity  -KB        Automatic Speech (Communication)  severe impairment;response to greeting;counting 1-20;sing happy birthday  -KB        Verbal Repetition (Communication)  " severe impairment;monosyllabic words;polysyllabic words  -KB        Phase Completion  severe impairment;automatic/predictable  -KB        Conversational Speech (Communication)  severe impairment  -KB           Cursory Voice Assessment/Intervention    Voice, Comment  Patient with decreased vocal intensity, not able to increased intensity despite maximal cues this date.   -KB           SLP Clinical Impressions    SLP Diagnosis  severe global aphasia, cognitive impairment  -KB        Rehab Potential/Prognosis  good  -KB        SLC Criteria for Skilled Therapy Interventions Met  yes  -KB        Functional Impact  functional impact in social situations;functional impact in ADLs;unable to make medical decisions;needs 24 hour supervision;difficulty communicating wants, needs;difficulty communicating in an emergency;difficulty in expressing complex messages;difficulty completing home management task;difficulty completing vocational tasks  -KB           Recommendations    Therapy Frequency (SLP SLC)  5 days per week  -KB        Predicted Duration Therapy Intervention (Days)  until discharge  -KB        Anticipated Dischage Disposition  home with assist;anticipate therapy at next level of care  -KB           Auditory Comprehension Treatment Objectives    Words/Phrases/Sentences Selection  words/phrases/sentences, SLP goal 1  -KB        Comprehend Questions Selection  comprehend questions, SLP goal 1  -KB           Words/Phrases/Sentences Goal 1 (SLP)    Improve Ability to Comprehend Words/Phrases/Sentences Through: Goal 1 (SLP)  identify body part;identify familiar objects;identified by name;90%;independently (over 90% accuracy)  -KB        Time Frame (Identify Objects and Pictures Goal 1, SLP)  by discharge  -KB           Comprehend Questions Goal 1 (SLP)    Improve Ability to Comprehend Questions Goal 1 (SLP)  questions about personal information;simple yes/no questions;simple wh questions;simple general  questions;90%;independently (over 90% accuracy)  -KB        Time Frame (Comprehend Questions Goal 1, SLP)  by discharge  -KB           Verbal Expression Treatment Objectives    Word Retrieval Skills Selection  word retrieval, SLP goal 1;word retrieval, SLP goal 2  -KB           Word Retrieval Skills Goal 1 (SLP)    Improve Word Retrieval Skills By Goal 1 (SLP)  completing automatic speech task, counting;completing automatic speech task, alphabet;completing automatic speech task, days of the week;completing automatic speech task, months;completing automatic speech task, sing “Happy Birthday”  -KB        Time Frame (Word Retrieval Goal 1, SLP)  by discharge  -KB           Word Retrieval Skills Goal 2 (SLP)    Improve Word Retrieval Skills By Goal 2 (SLP)  confrontational naming task;high frequency;completing open ended structured sentence;90%;independently (over 90% accuracy)  -KB        Time Frame (Word Retrieval Goal 2, SLP)  by discharge  -KB           Graphic Expression Treatment Objectives    Graphic Expression of Shapes, Letters and Numbers Selection  graphic expression of shapes, letters, and numbers, SLP goal 1  -KB           Graphic Expression of Shapes, Letters, Numbers Goal 1 (SLP)    Improve Graphic Expression of Shapes, Letters, and Numbers Goal 1 (SLP)  copy shapes, numbers, and letters;writing dictated number and letters;90%;independently (over 90% accuracy)  -KB           Motor Speech/Dysarthria Treatment Objectives    Articulation Selection  --  -KB           Motor Speech/Apraxia Treatment Objectives    Motor Planning Selection  --  -KB        Planning and Execution of Connected Speech Selection  planning and execution of connected speech, SLP goal 1  -KB           Planning and Execution of Connected Speech Goal 1 (SLP)    Improve Planning and Execution of Connected Speech by Goal 1 (SLP)  imitating consonant-vowel combos;imitating one syllable words;imitating two syllable words;completing open-ended  sentences;repeating phrases;90%;independently (over 90% accuracy)  -KB           Augmentative/Alternative Communication Objectives    Augmentative/Alternative Communication Selection  augmentative/alternative communication, SLP goal 1  -KB           Augmentative/Alternative Communication Objectives Goal 1 (SLP    Communication (Augmentative/Alternative Communication Goal 1, SLP)  improve ability to use non-verbal communication strategies;improve ability to use low tech augmentative/alternative communication device  -KB        Improve Communication by (Augmentative/Alternative Communication Goal 1, SLP)  use gestures to communicate;use gesture with object to communicate;alphabet/picture board;90%;independently (over 90% accuracy)  -KB        Time Frame (Augmentative/Alternative Communication Goal 1, SLP)  by discharge  -KB           Additional Goals    Additional Goal Selection  additional goal, SLP goal 1  -KB           Additional Goal 1 (SLP)    Additional Goal 1, SLP  Complete diagnostic treatment tasks for cognition.   -KB          User Key  (r) = Recorded By, (t) = Taken By, (c) = Cosigned By    Initials Name Effective Dates    KB Bruton, Katherine L 03/07/18 -         EDUCATION  The patient has been educated in the following areas:   Cognitive Impairment Communication Impairment.    SLP Recommendation and Plan  SLP Diagnosis: severe global aphasia, cognitive impairment  SLP Diagnosis: severe global aphasia, cognitive impairment  Rehab Potential/Prognosis: good  Anticipated Dischage Disposition: home with assist, anticipate therapy at next level of care  Predicted Duration Therapy Intervention (Days): until discharge  Plan of Care Review  Plan of Care Reviewed With: patient    SLP GOALS     Row Name 08/01/19 1230             Words/Phrases/Sentences Goal 1 (SLP)    Improve Ability to Comprehend Words/Phrases/Sentences Through: Goal 1 (SLP)  identify body part;identify familiar objects;identified by  name;90%;independently (over 90% accuracy)  -KB      Time Frame (Identify Objects and Pictures Goal 1, SLP)  by discharge  -KB         Comprehend Questions Goal 1 (SLP)    Improve Ability to Comprehend Questions Goal 1 (SLP)  questions about personal information;simple yes/no questions;simple wh questions;simple general questions;90%;independently (over 90% accuracy)  -KB      Time Frame (Comprehend Questions Goal 1, SLP)  by discharge  -KB         Word Retrieval Skills Goal 1 (SLP)    Improve Word Retrieval Skills By Goal 1 (SLP)  completing automatic speech task, counting;completing automatic speech task, alphabet;completing automatic speech task, days of the week;completing automatic speech task, months;completing automatic speech task, sing “Happy Birthday”  -KB      Time Frame (Word Retrieval Goal 1, SLP)  by discharge  -KB         Word Retrieval Skills Goal 2 (SLP)    Improve Word Retrieval Skills By Goal 2 (SLP)  confrontational naming task;high frequency;completing open ended structured sentence;90%;independently (over 90% accuracy)  -KB      Time Frame (Word Retrieval Goal 2, SLP)  by discharge  -KB         Graphic Expression of Shapes, Letters, Numbers Goal 1 (SLP)    Improve Graphic Expression of Shapes, Letters, and Numbers Goal 1 (SLP)  copy shapes, numbers, and letters;writing dictated number and letters;90%;independently (over 90% accuracy)  -KB         Planning and Execution of Connected Speech Goal 1 (SLP)    Improve Planning and Execution of Connected Speech by Goal 1 (SLP)  imitating consonant-vowel combos;imitating one syllable words;imitating two syllable words;completing open-ended sentences;repeating phrases;90%;independently (over 90% accuracy)  -KB         Augmentative/Alternative Communication Objectives Goal 1 (SLP    Communication (Augmentative/Alternative Communication Goal 1, SLP)  improve ability to use non-verbal communication strategies;improve ability to use low tech  augmentative/alternative communication device  -KB      Improve Communication by (Augmentative/Alternative Communication Goal 1, SLP)  use gestures to communicate;use gesture with object to communicate;alphabet/picture board;90%;independently (over 90% accuracy)  -KB      Time Frame (Augmentative/Alternative Communication Goal 1, SLP)  by discharge  -KB         Additional Goal 1 (SLP)    Additional Goal 1, SLP  Complete diagnostic treatment tasks for cognition.   -KB        User Key  (r) = Recorded By, (t) = Taken By, (c) = Cosigned By    Initials Name Provider Type    KB Bruton, Katherine L Speech and Language Pathologist           SLP Outcome Measures (last 72 hours)      SLP Outcome Measures     Row Name 08/01/19 1600 07/31/19 1200 07/30/19 1500       SLP Outcome Measures    Outcome Measure Used?  Adult NOMS  -KB  Adult NOMS  -AW  Adult NOMS  -ML       Adult FCM Scores    FCM Chosen  Auditory Comprehension;Verbal Expression  -KB  Swallowing  -AW  Swallowing  -ML    Swallowing FCM Score  --  3  -AW  1  -ML    Auditory Comp Score FCM - (Spoken Language)  2  -KB  --  --    Verbal Expression FCM Score  2  -KB  --  --      User Key  (r) = Recorded By, (t) = Taken By, (c) = Cosigned By    Initials Name Effective Dates    AW Ingris Gomez, MS CCC-SLP 06/08/18 -     KB Bruton, Katherine L 03/07/18 -     ML Patsy Gregg, MS CCC-SLP 10/04/18 -         Time Calculation:   Time Calculation- SLP     Row Name 08/01/19 1300 08/01/19 1130          Time Calculation- SLP    SLP Start Time  1230  -KB  1100  -KB     SLP Stop Time  1300  -KB  1130  -KB     SLP Time Calculation (min)  30 min  -KB  30 min  -KB       User Key  (r) = Recorded By, (t) = Taken By, (c) = Cosigned By    Initials Name Provider Type    KB Bruton, Katherine L Speech and Language Pathologist        Therapy Charges for Today     Code Description Service Date Service Provider Modifiers Qty    [id number] [facility] ASSESSMENT OF APHASIA PER HOUR 8/1/2019 Bruton,  Jenna THORNTON GN 1           ADULT NOMS (last 72 hours)      Adult NOMS     Row Name 08/01/19 1600 07/31/19 1200 07/30/19 1500             Adult FCM Scores    FCM Chosen  Auditory Comprehension;Verbal Expression  -KB  Swallowing  -AW  Swallowing  -ML      Swallowing FCM Score  --  3  -AW  1  -ML      Auditory Comp Score FCM - (Spoken Language)  2  -KB  --  --      Verbal Expression FCM Score  2  -KB  --  --        User Key  (r) = Recorded By, (t) = Taken By, (c) = Cosigned By    Initials Name Effective Dates    Ingris White, MS CCC-SLP 06/08/18 -     KB Bruton, Katherine L 03/07/18 -     Patsy Barber MS CCC-SLP 10/04/18 -         Katherine L Bruton  8/1/2019

## 2019-08-01 NOTE — PROGRESS NOTES
Section B. Hearing, Speech, Vision: Expression of Ideas and Wants: Rarely or  never expresses self or speech is very difficult to understand.  Understanding Verbal and Non-Verbal Content: Rarely/Never understands.    Section C. Cognitive Patterns: Did not conduct Brief Interview for Mental Status  (BIMS). Patient is rarely/never understood, cannot respond verbally or in  writing, or an  is needed, but not available.  Staff Assessment for  Mental Status was conducted.  Memory/Recall Ability: Unable to recall current season, location of room, staff  names and faces, or that he/she is in a hospital or hospital unit.    Signed by: Katherine Bruton, SLP

## 2019-08-01 NOTE — PROGRESS NOTES
Inpatient Rehabilitation Plan of Care Note    Plan of Care  Care Plan Reviewed - No updates at this time.    Psychosocial    Performed Intervention(s)  Assist patient to express needs and concerns      Safety    Performed Intervention(s)  Bed alarm, wc alarm  Safety rounds  Items within reach      Body Systems    Performed Intervention(s)  Daily skin inspection      Sphincter Control    Performed Intervention(s)  Monitor intake and output  Encourage fluid intake  Elimination schedule    Signed by: Nicanor Dimas RN

## 2019-08-01 NOTE — PROGRESS NOTES
SECTION GG    Self Care Performance:   Oral Hygiene: [place] does less than half the effort. [place] lifts, holds or  supports trunk or limbs but provides less than half the effort.   Toileting Hygiene: [place] does all of the effort. Patient does none of the  effort to complete the activity. Or, the assistance of 2 or more helpers is  required for the patient to complete the activity.   Shower/Bathe Self: [place] does less than half the effort. [place] lifts, holds  or supports trunk or limbs but provides less than half the effort.   Upper Body Dressing: [place] does less than half the effort. [place] lifts, holds  or supports trunk or limbs but provides less than half the effort.   Lower Body Dressing: [place] does more than half the effort. [place] lifts or  holds trunk or limbs and provides more than half the effort.   Putting On/Taking Off Footwear: [place] does less than half the effort. [place]  lifts, holds or supports trunk or limbs but provides less than half the effort.    Self Care Discharge Goals:   Toileting Hygiene: [place] provides verbal cues or touching/steadying assistance  as patient completes activity.    Mobility Toilet Transfer Performance: [place] does less than half the effort.  [place] lifts, holds or supports trunk or limbs but provides less than half the  effort.    Mobility Toilet Transfer Discharge Goal: [place]    Signed by: Ingris Ansari, OT

## 2019-08-01 NOTE — THERAPY EVALUATION
Inpatient Rehabilitation - Physical Therapy Initial Evaluation       [facility]     Patient Name: Darby Workman  : 1945  MRN: [id number]    Today's Date: 2019                    Admit Date: 2019      Visit Dx: No diagnosis found.    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)   • Right-sided headache   • Acute ischemic stroke (CMS/HCC)   • Embolic stroke (CMS/HCC)   • Cerebral infarction due to stenosis of left carotid artery (CMS/HCC)   • Anticoagulated by anticoagulation treatment   • Stroke (cerebrum) (CMS/HCC)       Past Medical History:   Diagnosis Date   • Acute cerebrovascular accident (CVA) of cerebellum (CMS/HCC)    • Acute ischemic stroke (CMS/HCC)    • Arthritis    • Coronary artery disease    • CVA (cerebral vascular accident) (CMS/HCC)    • Diabetes mellitus (CMS/HCC)    • Heart attack (CMS/HCC)    • History of blood clots    • Hyperlipidemia    • Hypertension    • Vision loss        Past Surgical History:   Procedure Laterality Date   • CAROTID ENDARTERECTOMY Left 2019    Procedure: Left carotid endarterectomy;  Surgeon: Rupert Oliva MD;  Location: [facility];  Service: Neurosurgery   • EMBOLECTOMY Left 2019    Procedure: CEREBRAL ANGIOGRAM, LEFT INTERNAL CAROTID ARTERY STENT;  Surgeon: Rupert Oliva MD;  Location: [facility] ;  Service: Neurosurgery          PT ASSESSMENT (last 12 hours)      IRF Physical Therapy Evaluation     Row Name 19 1040          Evaluation/Treatment Time and Intent    Subjective Information  no complaints  -MD     Existing Precautions/Restrictions  fall  -MD     Document Type  evaluation  -MD     Mode of Treatment  physical therapy  -MD     Patient/Family Observations  Pt sitting at nsg station showing no signs of acute distress.  -MD      Row Name 08/01/19 1040          Living Environment    Living Environment Comment  unable to determine at this time due to global aphasia  -MD     Row Name 08/01/19 1040          Cognition/Psychosocial- PT/OT    Orientation Status (Cognition)  unable/difficult to assess  -MD     Follows Commands (Cognition)  follows one step commands;0-24% accuracy;physical/tactile prompts required;repetition of directions required;verbal cues/prompting required  -MD     Row Name 08/01/19 1040          Bed Mobility Assessment/Treatment    Supine-Sit [place] (Bed Mobility)  verbal cues;moderate assist (50% patient effort)  -MD     Sit-Supine [place] (Bed Mobility)  supervision  -MD     Bed Mobility, Safety Issues  cognitive deficits limit understanding  -MD     Row Name 08/01/19 1040          Bed-Chair Transfer    Bed-Chair [place] (Transfers)  verbal cues;moderate assist (50% patient effort) due to cognition and pt unable to follow commands  -MD     Row Name 08/01/19 1040          Sit-Stand Transfer    Sit-Stand [place] (Transfers)  verbal cues;minimum assist (75% patient effort)  -MD     Row Name 08/01/19 1040          Toilet Transfer    [place] Level (Toilet Transfer)  verbal cues;moderate assist (50% patient effort)  -MD     Assistive Device (Toilet Transfer)  grab bars/safety frame  -MD     Row Name 08/01/19 1040          Gait/Stairs Assessment/Training    [place] Level (Gait)  verbal cues;minimum assist (75% patient effort)  -MD     Assistive Device (Gait)  walker, front-wheeled  -MD     Distance in Feet (Gait)  80x2 standing rest break on 2nd walk   -MD     Pattern (Gait)  step-through  -MD     Deviations/Abnormal Patterns (Gait)  ataxic;gait speed decreased;stride length decreased;eliza decreased  -MD     Bilateral Gait Deviations  heel strike decreased  -MD     Comment (Gait/Stairs)  Min A to steer RWx.  Min A in pm w/o RWx w HHA  -MD     Row Name 08/01/19 1040          General ROM    GENERAL  ROM COMMENTS  B LE AROM WFL  -MD     Row Name 08/01/19 1040          MMT (Manual Muscle Testing)    Rt Lower Ext  Rt Hip Flexion;Rt Knee Extension;Rt Knee Flexion;Rt Ankle Dorsiflexion  -MD     Lt Lower Ext  Lt Hip Flexion;Lt Knee Extension;Lt Knee Flexion;Lt Ankle Dorsiflexion  -MD     Row Name 08/01/19 1040          MMT Right Lower Ext    Rt Hip Flexion MMT, Gross Movement  (3/5) fair  -MD     Rt Knee Extension MMT, Gross Movement  (3/5) fair  -MD     Rt Knee Flexion MMT, Gross Movement  (3/5) fair  -MD     Rt Ankle Dorsiflexion MMT, Gross Movement  (3/5) fair  -MD     Row Name 08/01/19 1040          MMT Left Lower Ext    Lt Hip Flexion MMT, Gross Movement  (3/5) fair  -MD     Lt Knee Extension MMT, Gross Movement  (3/5) fair  -MD     Lt Knee Flexion MMT, Gross Movement  (3/5) fair  -MD     Lt Ankle Dorsiflexion MMT, Gross Movement  (3/5) fair  -MD     Row Name 08/01/19 1040          Pain Scale: Numbers Pre/Post-Treatment    Pain Scale: Numbers, Pretreatment  0/10 - no pain  -MD     Row Name 08/01/19 1040          Static Sitting Balance    Level of [place] (Unsupported Sitting, Static Balance)  supervision  -MD     Sitting Position (Unsupported Sitting, Static Balance)  sitting edge of mat  -MD     Time Able to Maintain Position (Unsupported Sitting, Static Balance)  1 to 2 minutes  -MD     Row Name             Wound 07/17/19 1015 Left neck incision    Wound - Properties Group Date first assessed: 07/17/19  -LD Time first assessed: 1015  -LD Side: Left  -LD Location: neck  -LD Type: incision  -LD    Row Name 08/01/19 1040          PT Clinical Impression    General Observations of Patient  Pt presents s/p CVA w R hemiparesis.  Upon PT evaluation pt presents w global aphasia making it very difficult for pt to follow commands.  Pt requires Min-Mod A w functional mobility.  PT unable to formally test strength at this time due to pt cognition.  PT unable to determine pts prior level of function due to pt unable to  answer questions.  Pt will benifit from skilled PT to address mobility deficits and increase safety w mobility.  -MD     Frequency of Treatment (PT Eval)  5 times per week;60 minutes per session  -MD     Estimated Length of Stay, Weeks (PT Eval)  2 weeks  -MD     Anticipated Equipment Needs at Discharge (PT Eval)  front wheeled walker  -MD     Expected Discharge Disposition (PT Eval)  home with home health care  -MD     Row Name 08/01/19 1040          IRF PT Goals    Bed Mobility Goal Selection (PT-IRF)  bed mobility, PT goal 1  -MD     Transfer Goal Selection (PT-IRF)  transfers, PT goal 1;transfers, PT goal 2  -MD     Gait (Walking Locomotion) Goal Selection (PT-IRF)  gait, PT goal 1  -MD     Row Name 08/01/19 1040          Bed Mobility Goal 1 (PT-IRF)    Activity/Assistive Device (Bed Mobility Goal 1, PT-IRF)  sit to supine/supine to sit  -MD     [place] Level (Bed Mobility Goal 1, PT-IRF)  contact guard assist  -MD     Time Frame (Bed Mobility Goal 1, PT-IRF)  2 weeks  -MD     Row Name 08/01/19 1040          Transfer Goal 1 (PT-IRF)    Activity/Assistive Device (Transfer Goal 1, PT-IRF)  sit-to-stand/stand-to-sit;walker, rolling  -MD     [place] Level (Transfer Goal 1, PT-IRF)  contact guard assist  -MD     Time Frame (Transfer Goal 1, PT-IRF)  2 weeks  -MD     Row Name 08/01/19 1040          Transfer Goal 2 (PT-IRF)    Activity/Assistive Device (Transfer Goal 2, PT-IRF)  walker, rolling;car transfer  -MD     [place] Level (Transfer Goal 2, PT-IRF)  contact guard assist  -MD     Time Frame (Transfer Goal 2, PT-IRF)  2 weeks  -MD     Row Name 08/01/19 1040          Gait/Walking Locomotion Goal 1 (PT-IRF)    Activity/Assistive Device (Gait/Walking Locomotion Goal 1, PT-IRF)  gait (walking locomotion);walker, rolling  -MD     Gait/Walking Locomotion Distance Goal 1 (PT-IRF)  200  -MD     [place] Level (Gait/Walking Locomotion Goal 1, PT-IRF)  contact guard assist  -MD     Time Frame  (Gait/Walking Locomotion Goal 1, PT-IRF)  2 weeks  -MD     Row Name 08/01/19 1040          Positioning and Restraints    Pre-Treatment Position  sitting in chair/recliner  -MD     Post Treatment Position  wheelchair  -MD     In Wheelchair  sitting;exit alarm on;patient within staff view  -MD       User Key  (r) = Recorded By, (t) = Taken By, (c) = Cosigned By    Initials Name Provider Type    Mira Vaca, PT Physical Therapist    Martha Samuel RN Registered Nurse          Physical Therapy Education     Title: PT OT SLP Therapies (Not Started)     Topic: Physical Therapy (In Progress)     Point: Precautions (In Progress)     Learning Progress Summary           Patient Acceptance, E,D, NR by MD at 8/1/2019 12:16 PM                               User Key     Initials Effective Dates Name Provider Type Betina GREGG 04/03/18 -  Mira Chadwick, PT Physical Therapist PT                PT Recommendation and Plan    Frequency of Treatment (PT Eval): 5 times per week, 60 minutes per session  Anticipated Equipment Needs at Discharge (PT Eval): front wheeled walker         Outcome Measures     Row Name 07/31/19 1407 07/30/19 1500          How much difficulty does the patient currently have...    Turning from your back to your side while in flat bed without using bedrails?  --  3  -EH     Standing up from a chair using your arms (e.g., wheelchair, bedside chair)?  --  3  -EH     Moving from lying on back to sitting on the side of a flat bed without bedrails?  --  3  -EH        How much help from another person do you currently need...    Moving to and from a bed to a chair (including a wheelchair)?  --  2  -EH     Climbing 3-5 steps with a railing?  --  1  -EH     To walk in hospital room?  --  2  -EH     AM-PAC 6 Clicks Score (PT)  --  14  -EH        How much help from another is currently needed...    Putting on and taking off regular lower body clothing?  1  -KP  --     Bathing (including washing, rinsing, and drying)   1  -KP  --     Toileting (which includes using toilet bed pan or urinal)  1  -KP  --     Putting on and taking off regular upper body clothing  2  -KP  --     Taking care of personal grooming (such as brushing teeth)  2  -KP  --     Eating meals  2  -KP  --     AM-PAC 6 Clicks Score (OT)  9  -KP  --        Modified [place] Scale    Modified [place] Scale  --  4 - Moderately severe disability.  Unable to walk without assistance, and unable to attend to own bodily needs without assistance.  -        Functional Assessment    Outcome Measure Options  AM-PAC 6 Clicks Daily Activity (OT)  -KP  --       User Key  (r) = Recorded By, (t) = Taken By, (c) = Cosigned By    Initials Name Provider Type    Idalmis Pearl, OTR Occupational Therapist     Alisha Ash, DANIEL Physical Therapy Assistant               Time Calculation:     PT Charges     Row Name 08/01/19 1328 08/01/19 1040          Time Calculation    Start Time  1300  -MD  1030  -MD     Stop Time  1330  -MD  1100  -MD     Time Calculation (min)  30 min  -MD  30 min  -MD     PT Received On  --  08/01/19  -MD     PT - Next Appointment  --  08/02/19  -MD     PT Goal Re-Cert Due Date  --  08/08/19  -MD       User Key  (r) = Recorded By, (t) = Taken By, (c) = Cosigned By    Initials Name Provider Type    Mira Vaca, PT Physical Therapist          Therapy Charges for Today     Code Description Service Date Service Provider Modifiers Qty    52988069378 HC PT EVAL LOW COMPLEXITY 2 8/1/2019 Mira Chadwick, PT GP 1    03864666714 HC PT THER PROC EA 15 MIN 8/1/2019 Mira Chadwick, PT GP 3                   Mira Chadwick PT  8/1/2019

## 2019-08-01 NOTE — THERAPY EVALUATION
Inpatient Rehabilitation - Occupational Therapy Initial Evaluation    [facility]     Patient Name: Darby Workman  : 1945  MRN: [id number]    Today's Date: 2019                 Admit Date: 2019       No diagnosis found.    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)   • Right-sided headache   • Acute ischemic stroke (CMS/HCC)   • Embolic stroke (CMS/HCC)   • Cerebral infarction due to stenosis of left carotid artery (CMS/HCC)   • Anticoagulated by anticoagulation treatment       Past Medical History:   Diagnosis Date   • Acute cerebrovascular accident (CVA) of cerebellum (CMS/HCC)    • Acute ischemic stroke (CMS/HCC)    • Arthritis    • Coronary artery disease    • CVA (cerebral vascular accident) (CMS/HCC)    • Diabetes mellitus (CMS/HCC)    • Heart attack (CMS/HCC)    • History of blood clots    • Hyperlipidemia    • Hypertension    • Vision loss        Past Surgical History:   Procedure Laterality Date   • CAROTID ENDARTERECTOMY Left 2019    Procedure: Left carotid endarterectomy;  Surgeon: Rupert Oliva MD;  Location: [facility];  Service: Neurosurgery   • EMBOLECTOMY Left 2019    Procedure: CEREBRAL ANGIOGRAM, LEFT INTERNAL CAROTID ARTERY STENT;  Surgeon: Rupert Oliav MD;  Location: [facility] ;  Service: Neurosurgery            IRF OT ASSESSMENT FLOWSHEET (last 72 hours)      IRF Occupational Therapy Evaluation     Row Name 19 [id number]                   Evaluation/Treatment Time and Intent    Subjective Information  no complaints  -AF        Existing Precautions/Restrictions  fall red arm band  -AF        Document Type  evaluation  -AF        Mode of Treatment  occupational therapy  -AF        Patient/Family Observations  Pt sitting up in w/c in dining room after breakfast  -AF         Row Name 08/01/19 1159                   Cognition/Psychosocial- PT/OT    Affect/Mental Status (Cognitive)  unable/difficult to assess  -AF        Orientation Status (Cognition)  unable/difficult to assess  -AF        Follows Commands (Cognition)  follows one step commands;0-24% accuracy;increased processing time needed;initiation impaired;physical/tactile prompts required;repetition of directions required;verbal cues/prompting required  -AF        Personal Safety Interventions  fall prevention program maintained;gait belt;nonskid shoes/slippers when out of bed  -AF        Row Name 08/01/19 1159                   Bed Mobility Assessment/Treatment    Sit-Supine [place] (Bed Mobility)  supervision  -AF        Comment (Bed Mobility)  increased time to process  -AF        Row Name 08/01/19 1159                   Transfer Assessment/Treatment    Transfer Assessment/Treatment  toilet transfer;chair-bed transfer  -AF        Row Name 08/01/19 1159                   Chair-Bed Transfer    Chair-Bed [place] (Transfers)  verbal cues;nonverbal cues (demo/gesture);moderate assist (50% patient effort);minimum assist (75% patient effort) decreased cognition  -AF        Row Name 08/01/19 1159                   Toilet Transfer    Type (Toilet Transfer)  stand pivot/stand step  -AF        [place] Level (Toilet Transfer)  minimum assist (75% patient effort);verbal cues  -AF        Assistive Device (Toilet Transfer)  wheelchair;grab bars/safety frame  -AF        Row Name 08/01/19 1159                   Basic Activities of Daily Living (BADLs)    Basic Activities of Daily Living  bathing;upper body dressing;lower body dressing;grooming;toileting  -AF        Row Name 08/01/19 1159                   Bathing Assessment/Treatment    Bathing [place] Level  moderate assist (50% patient effort);maximum assist (25% patient effort)  -AF        Bathing Position  sink side;supported sitting;supported standing  -AF         Comment (Bathing)  difficulty with understanding task   -AF        Row Name 08/01/19 1159                   Upper Body Dressing Assessment/Treatment    Upper Body Dressing Task  upper body dressing skills;minimum assist (75% or more patient effort);moderate assist (50-74% patient effort);verbal cues;nonverbal cues (demo/gesture)  -AF        Upper Body Dressing Position  supported sitting  -AF        Row Name 08/01/19 1159                   Lower Body Dressing Assessment/Treatment    Lower Body Dressing [place] Level  don;pants/bottoms;shoes/slippers;socks;maximum assist (25% patient effort);verbal cues;nonverbal cues (demo/gesture)  -AF        Lower Body Dressing Position  supported sitting;supported standing  -AF        Comment (Lower Body Dressing)  difficulty with understanding   -AF        Row Name 08/01/19 1159                   Grooming Assessment/Treatment    Grooming [place] Level  grooming skills;maximum assist (25% patient effort);verbal cues;dependent (less than 25% patient effort)  -AF        Grooming Position  supported sitting;sink side  -AF        Comment (Grooming)  difficulty understanding   -[facility] Name 08/01/19 1159                   General ROM    GENERAL ROM COMMENTS  LUE AROM WFL, PROM R shoulder WNL, diffiuclty with completing testing   -[facility] Name 08/01/19 1159                   Motor Assessment/Intervention    Additional Documentation  Hand  Strength Testing (Group);Fine Motor Testing & Training (Group)  -[facility] Name 08/01/19 1159                   Hand  Strength Testing    Right Hand, Setting 1 (Dynamometer Testing)  0  -AF        Left Hand, Setting 1 (Dynamometer Testing)  20  -AF        Row Name 08/01/19 1159                   Fine Motor Testing & Training    Comment, Fine Motor Coordination  difficulty with formally testing fine motor coordination. with observation tremors and decreased coordination noted in R hand when completing ADL tasks and  simple peg placement   -AF        Row Name 08/01/19 1159                   Static Sitting Balance    Level of [place] (Unsupported Sitting, Static Balance)  supervision  -AF        Row Name 08/01/19 1159                   Static Standing Balance    Level of [place] (Supported Standing, Static Balance)  minimal assist, 75% patient effort;contact guard assist  -AF        Comment (Unsupported Standing, Static Balance)  stood with LBD tasks and to switch out w/c cushion  -AF        Row Name 08/01/19 1159                   Sensory    Sensory General Assessment  other (see comments) difficulty to eval secondary to congintion  -AF        Row Name 08/01/19 1159                   Pain Scale: FACES Pre/Post-Treatment    Pain: FACES Scale, Pretreatment  0-->no hurt  -AF        Pain: FACES Scale, Post-Treatment  0-->no hurt  -AF        Row Name 08/01/19 1159                   Therapeutic Exercise    Therapeutic Exercise  neuromuscular re-education  -AF        Additional Documentation  Therapeutic Exercise (Row)  -AF        Row Name 08/01/19 1159                   Neuromuscular Re-education    Comment (Neuromuscular Re-education)  with simple placing pegs into board hand over hand to iniation task with L hand, unable to grasp with R hand, becomes frustrated. unable to  certian color of peg when asked  -AF        Row Name 08/01/19 1159                   OT Clinical Impression    General Observations of Patient  Pt admitted to rehab s/p L carotid endarterectomy with R hemiparesis following surgery. Pt demos decreased cognition, unable to follow directions, only able to voice a few words, decreased coordination and strength in RUE and decreased awareness of deficits are all limiting her safe completing of ADl tasks to PLOF. Pt may beneift from skilled OT services prior to d/c home with [facility].   -AF        Patient's Goals For Discharge  other (see comments) unable to state  -AF        Rehab Potential/Prognosis:  Occupational Therapy  adequate, monitor progress closely  -AF        Frequency of Treatment (OT Eval)  5 times per week  -AF        Estimated Length of Stay (OT Eval)  2 weeks  -AF        Limitations/Impairments  safety/cognitive  -AF        Anticipated Equipment Needs At Discharge (OT Eval)  bathing equipment  -AF        Expected Discharge Disposition (OT Eval)  home with home health care 24 hour assistance   -AF        Planned Therapy Interventions (OT Eval)  activity tolerance training;BADL retraining;cognitive/visual perception retraining;functional balance retraining;neuromuscular control/coordination retraining;occupation/activity based interventions;patient/caregiver education/training;ROM/therapeutic exercise;strengthening exercise;transfer/mobility retraining  -AF        Row Name 08/01/19 1159                   IRF OT Goals    Transfer Goal Selection (OT-IRF)  transfers, OT goal 1;transfers, OT goal 2  -AF        Bathing Goal Selection (OT-IRF)  bathing, OT goal 1;bathing, OT goal 2  -AF        UB Dressing Goal Selection (OT-IRF)  UB dressing, OT goal 1;UB dressing, OT goal 2  -AF        LB Dressing Goal Selection (OT-IRF)  LB dressing, OT goal 1;LB dressing, OT goal 2  -AF        Grooming Goal Selection (OT-IRF)  grooming, OT goal 1;grooming, OT goal 2  -AF        Toileting Goal Selection (OT-IRF)  toileting, OT goal 1;toileting, OT goal 2  -AF        Functional Mobility Goal Selection (OT)  functional mobility, OT goal 1  -AF        Caregiver Training Goal Selection (OT-IRF)  caregiver training, OT goal 1  -AF        Safety Awareness Goal Selection (OT-IRF)  safety awareness, OT goal 1  -AF        Additional Documentation  Caregiver Training Goal Selection (OT-IRF) (Row);Grooming Goal Selection (OT-IRF) (Row);Functional Mobility Goal Selection (OT-IRF) (Row);Safety Awareness Goal Selection (OT-IRF) (Row);Toileting Goal Selection (OT-IRF) (Row);Transfer Goal Selection (OT-IRF) (Row)  -AF        Row Name  08/01/19 1159                   Transfer Goal 1 (OT-IRF)    Activity/Assistive Device (Transfer Goal 1, OT-IRF)  toilet;shower chair;walk-in shower  -AF        [place] Level (Transfer Goal 1, OT-IRF)  verbal cues required;minimum assist (75% or more patient effort);contact guard assist  -AF        Time Frame (Transfer Goal 1, OT-IRF)  short term goal (STG)  -AF        Progress/Outcomes (Transfer Goal 1, OT-IRF)  goal ongoing  -AF        Row Name 08/01/19 1159                   Transfer Goal 2 (OT-IRF)    Activity/Assistive Device (Transfer Goal 2, OT-IRF)  shower chair;walk-in shower;toilet  -AF        [place] Level (Transfer Goal 2, OT-IRF)  contact guard assist;verbal cues required  -AF        Time Frame (Transfer Goal 2, OT-IRF)  long term goal (LTG)  -AF        Progress/Outcomes (Transfer Goal 2, OT-IRF)  goal ongoing  -AF        Row Name 08/01/19 1156                   Bathing Goal 1 (OT-IRF)    Activity/Device (Bathing Goal 1, OT-IRF)  bathing skills, all;grab bar/tub rail;hand-held shower spray hose;shower chair  -AF        [place] Level (Bathing Goal 1, OT-IRF)  minimum assist (75% or more patient effort);verbal cues required  -AF        Time Frame (Bathing Goal 1, OT-IRF)  short term goal (STG)  -AF        Progress/Outcomes (Bathing Goal 1, OT-IRF)  goal ongoing  -AF        Row Name 08/01/19 1155                   Bathing Goal 2 (OT-IRF)    Activity/Device (Bathing Goal 2, OT-IRF)  bathing skills, all;grab bar/tub rail;hand-held shower spray hose;shower chair  -AF        [place] Level (Bathing Goal 2, OT-IRF)  contact guard assist;verbal cues required  -AF        Time Frame (Bathing Goal 2, OT-IRF)  long term goal (LTG)  -AF        Progress/Outcomes (Bathing Goal 2, OT-IRF)  goal ongoing  -AF        Row Name 08/01/19 1154                   UB Dressing Goal 1 (OT-IRF)    Activity/Device (UB Dressing Goal 1, OT-IRF)  upper body dressing  -AF        [place] (UB Dress Goal 1, OT-IRF)   set-up required  -AF        Time Frame (UB Dressing Goal 1, OT-IRF)  long term goal (LTG)  -AF        Progress/Outcomes (UB Dressing Goal 1, OT-IRF)  goal ongoing  -        Row Name 08/01/19 1159                   LB Dressing Goal 1 (OT-IRF)    Activity/Device (LB Dressing Goal 1, OT-IRF)  lower body dressing  -AF        [place] (LB Dressing Goal 1, OT-IRF)  moderate assist (50-74% patient effort);verbal cues required  -AF        Time Frame (LB Dressing Goal 1, OT-IRF)  short term goal (STG)  -AF        Progress/Outcomes (LB Dressing Goal 1, OT-IRF)  goal ongoing  -        Row Name 08/01/19 1159                   LB Dressing Goal 2 (OT-IRF)    Activity/Device (LB Dressing Goal 2, OT-IRF)  lower body dressing  -AF        [place] (LB Dressing Goal 2, OT-IRF)  contact guard assist;verbal cues required  -AF        Time Frame (LB Dressing Goal 2, OT-IRF)  long term goal (LTG)  -AF        Progress/Outcomes (LB Dressing Goal 2, OT-IRF)  goal ongoing  -        Row Name 08/01/19 1159                   Grooming Goal 1 (OT-IRF)    Activity/Device (Grooming Goal 1, OT-IRF)  grooming skills, all  -AF        [place] (Grooming Goal 1, OT-IRF)  minimum assist (75% or more patient effort);verbal cues required  -AF        Time Frame (Grooming Goal 1, OT-IRF)  short term goal (STG)  -AF        Progress/Outcomes (Grooming Goal 1, OT-IRF)  goal ongoing  -        Row Name 08/01/19 1159                   Grooming Goal 2 (OT-IRF)    Activity/Device (Grooming Goal 2, OT-IRF)  grooming skills, all  -AF        [place] (Grooming Goal 2, OT-IRF)  supervision required;verbal cues required  -AF        Time Frame (Grooming Goal 2, OT-IRF)  long term goal (LTG)  -AF        Progress/Outcomes (Grooming Goal 2, OT-IRF)  goal ongoing  -        Row Name 08/01/19 1159                   Toileting Goal 1 (OT-IRF)    Activity/Device (Toileting Goal 1, OT-IRF)  toileting skills, all;grab bar/safety frame;raised toilet seat  -AF         [place] Level (Toileting Goal 1, OT-IRF)  moderate assist (50-74% patient effort);verbal cues required  -AF        Time Frame (Toileting Goal 1, OT-IRF)  short term goal (STG)  -AF        Progress/Outcomes (Toileting Goal 1, OT-IRF)  goal ongoing  -        Row Name 08/01/19 1159                   Toileting Goal 2 (OT-IRF)    Activity/Device (Toileting Goal 2, OT-IRF)  toileting skills, all;grab bar/safety frame;raised toilet seat  -AF        [place] Level (Toileting Goal 2, OT-IRF)  verbal cues required;contact guard assist  -AF        Time Frame (Toileting Goal 2, OT-IRF)  long term goal (LTG)  -AF        Progress/Outcomes (Toileting Goal 2, OT-IRF)  goal ongoing  [facility]        Row Name 08/01/19 1159                   Functional Mobility Goal 1 (OT)    Activity/Assistive Device (Functional Mobility Goal 1, OT)  assistive device use  -AF        [place] Level/Cues Needed (Functional Mobility Goal 1, OT)  contact guard assist  -AF        Distance Goal 1 (Functional Mobility, OT)  to and from bathroom  -AF        Time Frame (Functional Mobility Goal 1, OT)  long term goal (LTG)  -AF        Progress/Outcome (Functional Mobility Goal 1, OT)  goal ongoing  [facility]        Row Name 08/01/19 1150                   Caregiver Training Goal 1 (OT-IRF)    Caregiver Training Goal 1 (OT-IRF)  Family and patient to demo safe technique with ADLs, transfers, HEP and adaptive equipment as needed   -AF        Time Frame (Caregiver Training Goal 1, OT-IRF)  long term goal (LTG)  -AF        Progress/Outcomes (Caregiver Training Goal 1, OT-IRF)  goal ongoing  [facility]        Row Name 08/01/19 1151                   Positioning and Restraints    Pre-Treatment Position  sitting in chair/recliner  -AF        Post Treatment Position  bed  -AF        In Bed  supine;notified nsg;exit alarm on;encouraged to call for assist;call light within reach  -AF          User Key  (r) = Recorded By, (t) = Taken By, (c) = Cosigned By    Initials  Name Effective Dates    AF Ingris Ansari, OTR 04/03/18 -                    OT Recommendation and Plan    Planned Therapy Interventions (OT Eval): activity tolerance training, BADL retraining, cognitive/visual perception retraining, functional balance retraining, neuromuscular control/coordination retraining, occupation/activity based interventions, patient/caregiver education/training, ROM/therapeutic exercise, strengthening exercise, transfer/mobility retraining              Outcome Measures     Row Name 07/31/19 1407 07/30/19 1500 07/29/19 1339       How much difficulty does the patient currently have...    Turning from your back to your side while in flat bed without using bedrails?  --  3  -EH  --    Standing up from a chair using your arms (e.g., wheelchair, bedside chair)?  --  3  -EH  --    Moving from lying on back to sitting on the side of a flat bed without bedrails?  --  3  -EH  --       How much help from another person do you currently need...    Moving to and from a bed to a chair (including a wheelchair)?  --  2  -EH  --    Climbing 3-5 steps with a railing?  --  1  -EH  --    To walk in hospital room?  --  2  -EH  --    AM-PAC 6 Clicks Score (PT)  --  14  -EH  --       How much help from another is currently needed...    Putting on and taking off regular lower body clothing?  1  -KP  --  1  -SG    Bathing (including washing, rinsing, and drying)  1  -KP  --  1  -SG    Toileting (which includes using toilet bed pan or urinal)  1  -KP  --  1  -SG    Putting on and taking off regular upper body clothing  2  -KP  --  2  -SG    Taking care of personal grooming (such as brushing teeth)  2  -KP  --  2  -SG    Eating meals  2  -KP  --  1  -SG    AM-PAC 6 Clicks Score (OT)  9  -KP  --  8  -SG       Modified Kehinde Scale    Modified Kehinde Scale  --  4 - Moderately severe disability.  Unable to walk without assistance, and unable to attend to own bodily needs without assistance.  -EH  --       Functional  Assessment    Outcome Measure Options  AM-PAC 6 Clicks Daily Activity (OT)  -  --  --    Row Name 07/29/19 1300             How much difficulty does the patient currently have...    Turning from your back to your side while in flat bed without using bedrails?  2  -EH      Standing up from a chair using your arms (e.g., wheelchair, bedside chair)?  3  -EH      Moving from lying on back to sitting on the side of a flat bed without bedrails?  2  -EH         How much help from another person do you currently need...    Moving to and from a bed to a chair (including a wheelchair)?  2  -EH      Climbing 3-5 steps with a railing?  1  -EH      To walk in hospital room?  2  -EH      AM-PAC 6 Clicks Score (PT)  12  -EH         Modified [place] Scale    Modified [place] Scale  5 - Severe disability.  Bedridden, incontinent, and requiring constant nursing care and attention.  -EH        User Key  (r) = Recorded By, (t) = Taken By, (c) = Cosigned By    Initials Name Provider Type     Carla Francis, OTR Occupational Therapist    Idalmis Pearl, OTR Occupational Therapist     Alisha Ash, DANIEL Physical Therapy Assistant            Time Calculation:     OT Start Time: 0800  OT Stop Time: 0900  OT Time Calculation (min): 60 min  Therapy Charges for Today     Code Description Service Date Service Provider Modifiers Qty    41095222797  OT EVAL MOD COMPLEXITY 2 8/1/2019 Ingris Ansari OTR GO 1    [id number]  OT NEUROMUSC RE EDUCATION EA 15 MIN 8/1/2019 Ingris Ansari OTR GO 1    [id number]  OT SELF CARE/MGMT/TRAIN EA 15 MIN 8/1/2019 Ingris Ansari OTR GO 1                   JOHN Mejia  8/1/2019

## 2019-08-01 NOTE — PROGRESS NOTES
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  She attempts some verbalizations but not intelligible.  Difficulty following commands.  Does not appear to indicate any headache or abdominal pain.  Continues more alert.    History taken from: patient chart RN    Objective     Vital Signs  Temp:  [97.3 °F (36.3 °C)-98.2 °F (36.8 °C)] 97.3 °F (36.3 °C)  Heart Rate:  [63-80] 73  Resp:  [16-18] 17  BP: (100-173)/(52-86) 100/52    Objective  Physical Exam  MENTAL STATUS -  AWAKE / ALERT  HEENT- NCAT,   SCLERA NON-ICTERIC, CONJUNCTIVA PINK, OP MOIST, NO JVD, EARS UNREMARKABLE EXTERNALLY  LUNGS - CTA, NO WHEEZES, RALES OR RHONCHI  HEART- RRR, NO RUB, MURMUR, OR GALLOP  ABD - NORMOACTIVE BOWEL SOUNDS, SOFT, NT.    EXT - NO EDEMA OR CYANOSIS  NEURO -alert.  Aphasia.  She will get occasional single word but not in context of the question asked.  Does not follow commands.     Right facial droop.   MOTOR EXAM -patient actively moves the left upper extremity left lower extremity.  She will do some movement in the right upper extremity right lower extremity but does not follow commands for accurate manual muscle testing.         Results Review:     I reviewed the patient's new clinical results.  Glucose   Date/Time Value Ref Range Status   08/01/2019 1101 286 (H) 70 - 130 mg/dL Final    08/01/2019 0704 133 (H) 70 - 130 mg/dL Final   07/31/2019 2022 191 (H) 70 - 130 mg/dL Final   07/31/2019 1656 98 70 - 130 mg/dL Final   07/31/2019 1631 59 (L) 70 - 130 mg/dL Final   07/31/2019 1613 51 (L) 70 - 130 mg/dL Final   07/31/2019 1609 48 (C) 70 - 130 mg/dL Final   07/31/2019 1129 200 (H) 70 - 130 mg/dL Final     Results from last 7 days   Lab Units 08/01/19  0648   WBC 10*3/mm3 6.87   HEMOGLOBIN g/dL 7.4*   HEMATOCRIT % 23.6*   PLATELETS 10*3/mm3 442       Ref. Range 5/22/2019 05:44 5/22/2019 11:34 7/9/2019 08:25 7/18/2019 04:49 7/19/2019 05:05 7/23/2019 05:56 8/1/2019 06:48   Hemoglobin Latest Ref Range: 12.0 - 15.9 g/dL 10.8 (L)  10.6 (L) 8.5 (L) 9.7 (L) 9.3 (L) 7.4 (L)   Hematocrit Latest Ref Range: 34.0 - 46.6 % 35.1  33.4 (L) 26.2 (L) 29.9 (L) 28.6 (L) 23.6 (L)     Results from last 7 days   Lab Units 08/01/19  0647   SODIUM mmol/L 139   POTASSIUM mmol/L 3.7   CHLORIDE mmol/L 101   CO2 mmol/L 29.1*   BUN mg/dL 23   CREATININE mg/dL 0.69   CALCIUM mg/dL 9.0   BILIRUBIN mg/dL 0.3   ALK PHOS U/L 107   ALT (SGPT) U/L 31   AST (SGOT) U/L 32   GLUCOSE mg/dL 123*         Ref. Range 8/1/2019 06:47   25 Hydroxy, Vitamin D Latest Ref Range: 30.0 - 100.0 ng/ml 21.8 (L)       Ref. Range 8/1/2019 06:47   Total Cholesterol Latest Ref Range: 0 - 200 mg/dL 105   HDL Cholesterol Latest Ref Range: 40 - 60 mg/dL 40   LDL Cholesterol  Latest Ref Range: 0 - 100 mg/dL 57   VLDL Cholesterol Latest Ref Range: 5 - 40 mg/dL 8   Triglycerides Latest Ref Range: 0 - 150 mg/dL 40   Medication Review: done  Scheduled Meds:  amantadine 100 mg Oral QAM   apixaban 5 mg Oral Q12H   aspirin 325 mg Oral Daily   atorvastatin 80 mg Oral Nightly   brimonidine 1 drop Both Eyes BID   dorzolamide 1 drop Both Eyes BID   glipiZIDE 5 mg Oral BID AC   insulin regular 0-14 Units Subcutaneous TID AC   losartan-HCTZ (HYZAAR) 100-12.5 combo dose  Oral Daily   nebivolol 20 mg Oral Daily   nystatin 5 mL Swish & Spit 4x Daily   pantoprazole 40 mg Oral Q  AM   vitamin D 50,000 Units Oral Q7 Days     Continuous Infusions:   PRN Meds:.dextrose  •  dextrose  •  glucagon (human recombinant)  •  nitroglycerin      Assessment/Plan       Stroke (cerebrum) (CMS/[facility])      Assessment & Plan  Status post left CVA with aphasia and spastic right hemiparesis    Neuro stimulation-amantadine added July 27    Dysphagia-Cortrak feeding tube discontinued on July 31 and started on puréed/honey thick liquid diet with strategies    History of multiple bilateral strokes and left central retinal artery occlusion    History of left greater than right internal carotid artery stenosis-status post left internal carotid artery stent July 17, 2019    History of hypertension - Hyzaar/ nebivolol    History of diabetes mellitus -Lantus/glipizide  August 1-blood sugars were low yesterday afternoon after tube feeds discontinued.  Held glipizide last night and this morning and Lantus last night.  Blood sugar elevated at noontime today.  Will receive resume glipizide at a lower dose of 5 mg twice a day with meals.  Continue sliding scale insulin coverage.  She also is on metformin at home.    Stroke prophylaxis-aspirin/Eliquis/atorvastatin    Anemia-August 1-hemoglobin 7.4.  Most recent check on July 23 HGB 9.3.  Will recheck hemoglobin this afternoon.  Hemoccult stool.  Iron studies.  Reticulocyte count.  Recheck CBC in the a.m.  Added Protonix.  Patient is on aspirin and Eliquis.  With recent stroke  will look to transfuse packed red blood cell.    DVT prophylaxis-SCDs/anticoagulation    Impulsivity-   Nursing to do re-orientation with the patient.  Room close to the nursing station.    Endocrine-vitamin D deficiency-ergocalciferol 50,000 units weekly x8 weeks added. Vitamin B12 level and TSH checked and late May unremarkable     Impaired cognition/impaired language, impaired swallow, impaired activity daily living, impaired mobility     Now admit for comprehensive acute inpatient rehabilitation .   This would be an interdisciplinary program with physical therapy 1 hour,  occupational therapy 1 hour, and speech therapy 1 hour, 5 days a week.  Rehabilitation nursing for carryover, monitoring of cardiopulmonary and neurologic   status, bowel and bladder, and skin  Ongoing physician follow-up.  Weekly team conferences.  Goals are indeterminant.   Rehabilitation prognosis determined.  Medical prognosis determined.  Estimated length of stay is indeterminate.          Ajit Howard MD  08/01/19  1:39 PM    Time:   >35 minutes with > 50% face-to-face / floor time / coordination of care

## 2019-08-01 NOTE — PLAN OF CARE
Problem: Skin Injury Risk (Adult)  Goal: Skin Health and Integrity  Outcome: Ongoing (interventions implemented as appropriate)      Problem: Fall Risk (Adult)  Goal: Absence of Fall  Outcome: Ongoing (interventions implemented as appropriate)      Problem: Patient Care Overview  Goal: Plan of Care Review  Outcome: Ongoing (interventions implemented as appropriate)   08/01/19 1614 08/01/19 1703   Patient Care Overview   Progress, Functional Goals demonstrating adequate progress --    OTHER   Outcome Summary --  Pt received 1 unit of blood today. Pt with global aphasia and wears a red bracelett for safety.      Goal: Individualization and Mutuality  Outcome: Ongoing (interventions implemented as appropriate)    Goal: Discharge Needs Assessment  Outcome: Ongoing (interventions implemented as appropriate)    Goal: Home Safety Plan  Outcome: Ongoing (interventions implemented as appropriate)    Goal: Coping Plan  Outcome: Ongoing (interventions implemented as appropriate)    Goal: Community Reintegration Plan  Outcome: Ongoing (interventions implemented as appropriate)      Problem: Stroke (IRF) (Adult)  Goal: Promote Optimal Functional [place]  Outcome: Ongoing (interventions implemented as appropriate)

## 2019-08-01 NOTE — PROGRESS NOTES
Inpatient Rehabilitation Functional Measures Assessment and Plan of Care    Plan of Care  Updated Problems/Interventions  Mobility    [OT] Toilet Transfers(Active)  Current Status(08/01/2019): MIN  Weekly Goal(08/08/2019): CGA  Discharge Goal: CGA    [OT] Tub/Shower Transfers(Active)  Current Status(08/01/2019): TBA  Weekly Goal(08/08/2019): MIN  Discharge Goal: CGA        Self Care    [OT] Bathing(Active)  Current Status(08/01/2019): MAX  Weekly Goal(08/08/2019): MOD  Discharge Goal: CGA    [OT] Dressing (Lower)(Active)  Current Status(08/01/2019): MOD  Weekly Goal(08/08/2019): MIN  Discharge Goal: CGA    [OT] Dressing (Upper)(Active)  Current Status(08/01/2019): MIN  Weekly Goal(08/08/2019): set up  Discharge Goal: set up    [OT] Grooming(Active)  Current Status(08/01/2019): MOD  Weekly Goal(08/08/2019): MIN  Discharge Goal: CGA    [OT] Toileting(Active)  Current Status(08/01/2019): DEP  Weekly Goal(08/08/2019): MOD  Discharge Goal: CGA    Functional Measures  KATI Eating:  Branch  KATI Grooming: Patient requires moderate assistance for washing, rinsing and  drying the face. Patient requires moderate assistance for washing, rinsing and  drying the hands. Patient requires moderate assistance for brushing teeth.  Patient requires total assistance for brushing/combing hair. Shaving or applying  makeup not applicable for this patient. Patient performs 0 -  24% of grooming  tasks.  Grooming Score = 1, Total Assistance. No assistive devices were  required.  KATI Bathing:  Patient took sponge bath. Patient requires no physical assistance  for washing, rinsing, and drying the right arm. Patient requires no physical  assistance for washing, rinsing, and drying the left arm. Patient requires no  physical assistance for washing, rinsing, and drying the chest. Patient requires  no physical assistance for washing, rinsing, and drying the abdomen. Patient  requires total assistance for washing, rinsing, or drying the perineal  area.  Patient requires total assistance for washing, rinsing, or drying the buttocks.  Patient requires total assistance for washing, rinsing, or drying the right  upper leg. Patient requires total assistance for washing, rinsing, or drying the  left upper leg. Patient requires total assistance for washing, rinsing, or  drying the right lower leg, including the foot. Patient requires total  assistance for washing, rinsing, or drying the left lower leg, including the  foot. Patient performs 40 % of bathing tasks. Bathing Score = 2, Maximal  Assistance. No assistive devices were required.  KATI Upper Body Dressing:  Patient requires no physical assistance for gathering  clothes. Patient requires no physical assistance for threading the right arm  through the undergarment. Patient requires no physical assistance for threading  the left arm through the undergarment. Patient requires total assistance for  holding clothing and/or adjusting undergarment around body and fastening or  placing undershirt overhead. Patient requires no physical assistance for pulling  the undergarment into place or pulling undershirt down over the trunk. Patient  requires no physical assistance for threading the right arm through the garment  (shirt/sweater). Patient requires no physical assistance for threading the left  arm through the garment (shirt/sweater). Patient requires no physical assistance  for pulling an over-head-garment over head or pulling front-fastening-garment  around back. Patient requires total assistance for holding clothing and/or  pulling an over-head-garment down the trunk or adjusting/fastening together a  front-fastening-garment. Patient performs 77.78 % of upper body dressing tasks.  Upper Body Dressing Score = 4, Minimal Assistance. No assistive devices were  required.  KATI Lower Body Dressing:  Patient requires no physical assistance for gathering  clothes. Wearing underwear or an undergarment is not applicable for  this  patient. Patient requires total assistance for holding clothing and/or threading  the right leg through the pants/skirt. Patient requires total assistance for  holding clothing and/or threading the left leg through the pants/skirt. Patient  requires total assistance for holding clothing and/or pulling pants/skirt over  hips and adjusting fasteners. Patient requires no physical assistance for  donning and/or doffing right sock. Patient requires no physical assistance for  donning and/or doffing left sock. Patient requires total assistance for holding  clothing and/or donning and/or doffing right shoe. Patient requires no physical  assistance for donning and/or doffing left shoe. Patient performs 50 % of lower  body dressing tasks. Lower Body Dressing Score = 3, Moderate Assistance. No  assistive devices were required.  [facility] Toileting:  Patient requires total assistance for adjusting clothing before  using a toilet, commode, bedpan, or urinal. Patient requires total assistance  for hygiene. Patient requires total assistance for adjusting clothing after  using a toilet, commode, bedpan, or urinal. Patient performs 0 -  24% of  toileting tasks.  Toileting Score = 1, Total Assistance. No assistive devices  were required.    [facility] Bladder Management  Level of Assistance:  Branch  Frequency/Number of Accidents this Shift:  Branch    [facility] Bowel Management  Level of Assistance: Branch  Frequency/Number of Accidents this Shift: Branch    [facility] Bed/Chair/Wheelchair Transfer:  [facility] Toilet Transfer:  Toilet Transfer Score = 4.  Patient performs 75% or more  of effort and minimal assistance (little/incidental help/steadying) for  transferring to and from the toilet/commode, requiring: Contact guard.  Steadying. Patient requires the following assistive device(s): Grab bars.  KATI Tub/Shower Transfer:  Branch    Previously Documented Mode of Locomotion at Discharge: Field  KATI Expected Mode of Locomotion at Discharge:  Branch  KATI Walk/Wheelchair:  Branch  KATI Stairs:  Branch    KATI Comprehension:  Branch  KATI Expression:  Branch  KATI Social Interaction:  Branch  KATI Problem Solving:  Branch  KATI Memory:  Branch    Therapy Mode Minutes  Occupational Therapy: Branch  Physical Therapy: Branch  Speech Language Pathology:  Branch    Signed by: Ingris Ansari OT

## 2019-08-02 LAB
ABO + RH BLD: NORMAL
ANION GAP SERPL CALCULATED.3IONS-SCNC: 11.7 MMOL/L (ref 5–15)
BASOPHILS # BLD AUTO: 0.08 10*3/MM3 (ref 0–0.2)
BASOPHILS NFR BLD AUTO: 0.8 % (ref 0–1.5)
BH BB BLOOD EXPIRATION DATE: NORMAL
BH BB BLOOD TYPE BARCODE: 7300
BH BB DISPENSE STATUS: NORMAL
BH BB PRODUCT CODE: NORMAL
BH BB UNIT NUMBER: NORMAL
BUN BLD-MCNC: 30 MG/DL (ref 8–23)
BUN/CREAT SERPL: 32.6 (ref 7–25)
CALCIUM SPEC-SCNC: 8.8 MG/DL (ref 8.6–10.5)
CHLORIDE SERPL-SCNC: 105 MMOL/L (ref 98–107)
CO2 SERPL-SCNC: 29.3 MMOL/L (ref 22–29)
CREAT BLD-MCNC: 0.92 MG/DL (ref 0.57–1)
DEPRECATED RDW RBC AUTO: 59 FL (ref 37–54)
EOSINOPHIL # BLD AUTO: 0.16 10*3/MM3 (ref 0–0.4)
EOSINOPHIL NFR BLD AUTO: 1.6 % (ref 0.3–6.2)
ERYTHROCYTE [DISTWIDTH] IN BLOOD BY AUTOMATED COUNT: 18.2 % (ref 12.3–15.4)
GFR SERPL CREATININE-BSD FRML MDRD: 60 ML/MIN/1.73
GLUCOSE BLD-MCNC: 193 MG/DL (ref 65–99)
GLUCOSE BLDC GLUCOMTR-MCNC: 123 MG/DL (ref 70–130)
GLUCOSE BLDC GLUCOMTR-MCNC: 125 MG/DL (ref 70–130)
GLUCOSE BLDC GLUCOMTR-MCNC: 182 MG/DL (ref 70–130)
GLUCOSE BLDC GLUCOMTR-MCNC: 221 MG/DL (ref 70–130)
HCT VFR BLD AUTO: 28.7 % (ref 34–46.6)
HGB BLD-MCNC: 9 G/DL (ref 12–15.9)
IMM GRANULOCYTES # BLD AUTO: 0.05 10*3/MM3 (ref 0–0.05)
IMM GRANULOCYTES NFR BLD AUTO: 0.5 % (ref 0–0.5)
LYMPHOCYTES # BLD AUTO: 1.16 10*3/MM3 (ref 0.7–3.1)
LYMPHOCYTES NFR BLD AUTO: 11.7 % (ref 19.6–45.3)
MCH RBC QN AUTO: 28.9 PG (ref 26.6–33)
MCHC RBC AUTO-ENTMCNC: 31.4 G/DL (ref 31.5–35.7)
MCV RBC AUTO: 92.3 FL (ref 79–97)
MONOCYTES # BLD AUTO: 0.8 10*3/MM3 (ref 0.1–0.9)
MONOCYTES NFR BLD AUTO: 8.1 % (ref 5–12)
NEUTROPHILS # BLD AUTO: 7.64 10*3/MM3 (ref 1.7–7)
NEUTROPHILS NFR BLD AUTO: 77.3 % (ref 42.7–76)
NRBC BLD AUTO-RTO: 0 /100 WBC (ref 0–0.2)
PLATELET # BLD AUTO: 428 10*3/MM3 (ref 140–450)
PMV BLD AUTO: 9.8 FL (ref 6–12)
POTASSIUM BLD-SCNC: 3.7 MMOL/L (ref 3.5–5.2)
RBC # BLD AUTO: 3.11 10*6/MM3 (ref 3.77–5.28)
SODIUM BLD-SCNC: 146 MMOL/L (ref 136–145)
UNIT  ABO: NORMAL
UNIT  RH: NORMAL
WBC NRBC COR # BLD: 9.89 10*3/MM3 (ref 3.4–10.8)

## 2019-08-02 PROCEDURE — 97112 NEUROMUSCULAR REEDUCATION: CPT

## 2019-08-02 PROCEDURE — 99222 1ST HOSP IP/OBS MODERATE 55: CPT | Performed by: INTERNAL MEDICINE

## 2019-08-02 PROCEDURE — 63710000001 INSULIN REGULAR HUMAN PER 5 UNITS: Performed by: PHYSICAL MEDICINE & REHABILITATION

## 2019-08-02 PROCEDURE — 97110 THERAPEUTIC EXERCISES: CPT

## 2019-08-02 PROCEDURE — 80048 BASIC METABOLIC PNL TOTAL CA: CPT | Performed by: PHYSICAL MEDICINE & REHABILITATION

## 2019-08-02 PROCEDURE — 85025 COMPLETE CBC W/AUTO DIFF WBC: CPT | Performed by: PHYSICAL MEDICINE & REHABILITATION

## 2019-08-02 PROCEDURE — 82962 GLUCOSE BLOOD TEST: CPT

## 2019-08-02 PROCEDURE — 97535 SELF CARE MNGMENT TRAINING: CPT

## 2019-08-02 PROCEDURE — 92507 TX SP LANG VOICE COMM INDIV: CPT

## 2019-08-02 PROCEDURE — 92526 ORAL FUNCTION THERAPY: CPT

## 2019-08-02 RX ORDER — PANTOPRAZOLE SODIUM 40 MG/1
40 TABLET, DELAYED RELEASE ORAL
Status: DISCONTINUED | OUTPATIENT
Start: 2019-08-02 | End: 2019-08-06 | Stop reason: CLARIF

## 2019-08-02 RX ADMIN — APIXABAN 5 MG: 5 TABLET, FILM COATED ORAL at 08:01

## 2019-08-02 RX ADMIN — PANTOPRAZOLE SODIUM 40 MG: 40 TABLET, DELAYED RELEASE ORAL at 05:40

## 2019-08-02 RX ADMIN — INSULIN HUMAN 3 UNITS: 100 INJECTION, SOLUTION PARENTERAL at 07:53

## 2019-08-02 RX ADMIN — NYSTATIN 500000 UNITS: 100000 SUSPENSION ORAL at 22:10

## 2019-08-02 RX ADMIN — GLIPIZIDE 5 MG: 5 TABLET ORAL at 15:39

## 2019-08-02 RX ADMIN — NYSTATIN 500000 UNITS: 100000 SUSPENSION ORAL at 08:01

## 2019-08-02 RX ADMIN — GLIPIZIDE 5 MG: 5 TABLET ORAL at 08:01

## 2019-08-02 RX ADMIN — DORZOLAMIDE HYDROCHLORIDE 1 DROP: 20 SOLUTION/ DROPS OPHTHALMIC at 08:02

## 2019-08-02 RX ADMIN — NYSTATIN 500000 UNITS: 100000 SUSPENSION ORAL at 12:19

## 2019-08-02 RX ADMIN — BRIMONIDINE TARTRATE 1 DROP: 2 SOLUTION OPHTHALMIC at 08:02

## 2019-08-02 RX ADMIN — ATORVASTATIN CALCIUM 80 MG: 80 TABLET, FILM COATED ORAL at 22:10

## 2019-08-02 RX ADMIN — NYSTATIN 500000 UNITS: 100000 SUSPENSION ORAL at 17:10

## 2019-08-02 RX ADMIN — NEBIVOLOL HYDROCHLORIDE 20 MG: 10 TABLET ORAL at 08:01

## 2019-08-02 RX ADMIN — DORZOLAMIDE HYDROCHLORIDE 1 DROP: 20 SOLUTION/ DROPS OPHTHALMIC at 22:10

## 2019-08-02 RX ADMIN — INSULIN HUMAN 5 UNITS: 100 INJECTION, SOLUTION PARENTERAL at 11:41

## 2019-08-02 RX ADMIN — LOSARTAN POTASSIUM: 50 TABLET, FILM COATED ORAL at 08:01

## 2019-08-02 RX ADMIN — PANTOPRAZOLE SODIUM 40 MG: 40 TABLET, DELAYED RELEASE ORAL at 15:37

## 2019-08-02 RX ADMIN — AMANTADINE HYDROCHLORIDE 100 MG: 100 CAPSULE ORAL at 05:40

## 2019-08-02 RX ADMIN — BRIMONIDINE TARTRATE 1 DROP: 2 SOLUTION OPHTHALMIC at 22:10

## 2019-08-02 RX ADMIN — APIXABAN 5 MG: 5 TABLET, FILM COATED ORAL at 22:10

## 2019-08-02 RX ADMIN — ASPIRIN 325 MG: 325 TABLET ORAL at 08:01

## 2019-08-02 NOTE — PROGRESS NOTES
Occupational Therapy: Branch    Physical Therapy: Branch    Speech Language Pathology:  Individual: 60 minutes.    Signed by: WHIT Guzman

## 2019-08-02 NOTE — CONSULTS
"[facility]  Initial Inpatient Consult Note    Referring Provider: Dr Howard    Reason for Consultation: Anemia, +FOBT    Subjective     History of present illness:    74 y.o. female who is s/p recent CVA suffered after carotic endarterectomy on 7/17/19.  She has just been transferred to rehab from [facility].  She had had progressive decline in Hb over last few days, and required 1U PRBC yesterday.  FOBT was performed and was positive.  The patient is noted to be on Eliquis and ASA.  She was on Plavix at one point as well but has not had this in over a week from best I can tell.  She is currently aphasic.  She answers no to every question posed to her.  Nursing reports some \"dark\" stool noted yesterday.      Past Medical History:  Past Medical History:   Diagnosis Date   • Acute cerebrovascular accident (CVA) of cerebellum (CMS/HCC)    • Acute ischemic stroke (CMS/HCC)    • Arthritis    • Coronary artery disease    • CVA (cerebral vascular accident) (CMS/HCC)    • Diabetes mellitus (CMS/HCC)    • Heart attack (CMS/HCC)    • History of blood clots    • Hyperlipidemia    • Hypertension    • Vision loss      Past Surgical History:  Past Surgical History:   Procedure Laterality Date   • CAROTID ENDARTERECTOMY Left 7/17/2019    Procedure: Left carotid endarterectomy;  Surgeon: Rupert Oliva MD;  Location: [facility];  Service: Neurosurgery   • EMBOLECTOMY Left 7/17/2019    Procedure: CEREBRAL ANGIOGRAM, LEFT INTERNAL CAROTID ARTERY STENT;  Surgeon: Rupert Oliva MD;  Location: [facility] 18/19;  Service: Neurosurgery      Social History:   Social History     Tobacco Use   • Smoking status: Never Smoker   • Smokeless tobacco: Never Used   Substance Use Topics   • Alcohol use: No     Frequency: Never      Family History:  Family History   Problem Relation Age of Onset   • Arthritis Mother    • Heart disease Father    • Breast cancer Neg Hx    • Malig Hyperthermia Neg Hx        Home " Meds:  Facility-Administered Medications Prior to Admission   Medication Dose Route Frequency Provider Last Rate Last Dose   • [DISCONTINUED] cyanocobalamin injection 1,000 mcg  1,000 mcg Intramuscular Once Maine Starr APRN         Medications Prior to Admission   Medication Sig Dispense Refill Last Dose   • amLODIPine (NORVASC) 10 MG tablet Take 1 tablet by mouth Daily. 30 tablet 0 7/17/2019 at Unknown time   • apixaban (ELIQUIS) 5 MG tablet tablet Take 1 tablet by mouth Every 12 (Twelve) Hours. 60 tablet  7/14/2019   • brimonidine (ALPHAGAN) 0.2 % ophthalmic solution Administer 1 drop to both eyes 2 (Two) Times a Day.   7/17/2019 at Unknown time   • dorzolamide (TRUSOPT) 2 % ophthalmic solution Administer 1 drop to both eyes 2 (Two) Times a Day.   7/16/2019 at Unknown time   • glipiZIDE (GLUCOTROL) 10 MG tablet Take 10 mg by mouth 2 (Two) Times a Day Before Meals.   7/16/2019 at Unknown time   • hydrALAZINE (APRESOLINE) 50 MG tablet Take 1 tablet by mouth Every 8 (Eight) Hours. 90 tablet 0 7/17/2019 at Unknown time   • metFORMIN (GLUCOPHAGE) 1000 MG tablet Take 1,000 mg by mouth 2 (Two) Times a Day With Meals.   7/16/2019 at Unknown time   • nebivolol (BYSTOLIC) 20 MG tablet Take 20 mg by mouth Daily.   7/17/2019 at 0330   • clopidogrel (PLAVIX) 75 MG tablet Take 75 mg by mouth Daily.   Taking     Current Meds:     amantadine 100 mg Oral QAM   apixaban 5 mg Oral Q12H   aspirin 325 mg Oral Daily   atorvastatin 80 mg Oral Nightly   brimonidine 1 drop Both Eyes BID   dorzolamide 1 drop Both Eyes BID   glipiZIDE 5 mg Oral BID AC   insulin regular 0-14 Units Subcutaneous TID AC   losartan-HCTZ (HYZAAR) 100-12.5 combo dose  Oral Daily   nebivolol 20 mg Oral Daily   nystatin 5 mL Swish & Spit 4x Daily   pantoprazole 40 mg Oral Q AM   vitamin D 50,000 Units Oral Q7 Days     Allergies:  No Known Allergies  Review of Systems  Review of systems could not be obtained due to   patient nonverbal.     Objective     Vital  Signs  Temp:  [97.3 °F (36.3 °C)-98.6 °F (37 °C)] 98.5 °F (36.9 °C)  Heart Rate:  [68-75] 72  Resp:  [17-20] 18  BP: ()/(52-84) 152/84  Physical Exam:  General Appearance:    Alert, aphasic   Head:    Normocephalic, without obvious abnormality, atraumatic   Eyes:            Lids and lashes normal, conjunctivae and sclerae normal, no   icterus   Throat:   No oral lesions, no thrush, oral mucosa moist   Neck:   No adenopathy, supple, trachea midline, no thyromegaly, no   carotid bruit, no JVD   Lungs:     Clear to auscultation,respirations regular, even and                   unlabored    Heart:    Regular rhythm and normal rate, normal S1 and S2, no            murmur, no gallop, no rub, no click   Chest Wall:    No abnormalities observed   Abdomen:     Normal bowel sounds, no masses, no organomegaly, soft        non-tender, non-distended, no guarding, no rebound                 tenderness   Rectal:     Deferred   Extremities:   no edema, no cyanosis, no redness   Skin:   No bleeding, bruising or rash   Lymph nodes:   No palpable adenopathy       Results Review:   I reviewed the patient's new clinical results.    Results from last 7 days   Lab Units 08/02/19  0720 08/01/19 2023 08/01/19  1425 08/01/19  0648   WBC 10*3/mm3 9.89  --   --  6.87   HEMOGLOBIN g/dL 9.0* 8.6* 7.8* 7.4*   HEMATOCRIT % 28.7* 27.4* 24.9* 23.6*   PLATELETS 10*3/mm3 428  --   --  442     Results from last 7 days   Lab Units 08/02/19  0720 08/01/19  0647   SODIUM mmol/L 146* 139   POTASSIUM mmol/L 3.7 3.7   CHLORIDE mmol/L 105 101   CO2 mmol/L 29.3* 29.1*   BUN mg/dL 30* 23   CREATININE mg/dL 0.92 0.69   CALCIUM mg/dL 8.8 9.0   BILIRUBIN mg/dL  --  0.3   ALK PHOS U/L  --  107   ALT (SGPT) U/L  --  31   AST (SGOT) U/L  --  32   GLUCOSE mg/dL 193* 123*         No results found for: LIPASE    Radiology:  No orders to display       Assessment/Plan   Assessment:   1.  Anemia with ? melena  2.  +FOBT  3.  Recent CVA s/p carotic artery stenosis  with stent placement    Plan:   Patient noted to be on Eliquis and full strength ASA currently, and s/p recent stent placement to carotic artery secondary to severe stenosis and CVA.  Would traditionally recommend bidirectional endoscopic evaluation to workup anemia with heme positive stool, but pt would ideally need to be off Eliquis for at least 48hrs prior to endoscopy.  I don't think this will be an option in this patient however given her recent neurologic insult and stent placement.  If she continues to have decline in Hb after transfusion, we may have to at least consider performing EGD on NOAC/ASA.  In the meantime I will increase her protonix to BID.        I discussed the patients findings and my recommendations with patient, nursing staff and consulting provider.         Ismael Donovan M.D.  [facility]  [address] [place] [address]  Office: [phone]

## 2019-08-02 NOTE — PROGRESS NOTES
Inpatient Rehabilitation Plan of Care Note    Plan of Care  Care Plan Reviewed - No updates at this time.    Safety    Performed Intervention(s)  Bed alarm, wc alarm  Safety rounds      Body Systems    Performed Intervention(s)  Daily skin inspection    Signed by: Rogers Erickson RN

## 2019-08-02 NOTE — PROGRESS NOTES
Inpatient Rehabilitation Functional Measures Assessment    Functional Measures  KATI Eating:  [facility] Grooming: [facility] Bathing:  [facility] Upper Body Dressing:  [facility] Lower Body Dressing:  [facility] Toileting:  [facility] Bladder Management  Level of Assistance:  [place]  Frequency/Number of Accidents this Shift:  [facility] Bowel Management  Level of Assistance: [place]  Frequency/Number of Accidents this Shift: [facility] Bed/Chair/Wheelchair Transfer:  [facility] Toilet Transfer:  [facility] Tub/Shower Transfer:  [place]    Previously Documented Mode of Locomotion at Discharge: Field  KATI Expected Mode of Locomotion at Discharge: [facility] Walk/Wheelchair:  [facility] Stairs:  [facility] Comprehension:  Auditory comprehension is the usual mode. Patient does not  comprehend complex/abstract information in their primary language without  assistance from a helper. Comprehension Score = 3, Moderate Prompting. Patient  comprehends basic daily needs or ideas 50-74% of the time. Patient requires  moderate/some prompting. Patient requires the following assistive device(s):  Glasses.  KATI Expression:  Non-vocal expression is the usual mode. Patient does not  express complex/abstract information in their primary language without a helper.  Expression Score = 1, Total Assistance. Patient expresses basic daily needs less  than 25% of the time, or does not express basic needs appropriately or  consistently despite prompting. No assistive devices were required.  KATI Social Interaction:  Social Interaction Score = 6, Modified Independent.  Patient is modified independent for social interaction, requiring: Requires  additional time.  KATI Problem Solving:  Activity was not observed.  KATI Memory:  Memory Score = 2, Maximal Prompting. Patient recognizes and  remembers 25-49% of the time. Patient requires maximal/a lot of prompting (most  of the time) for memory for the following: Disoriented to  person, place, time or  situation. Inability to recognize people or remember instructions/directions  requiring short-term recall (minutes, hours, days). Needs assistance for use of  environmental cues. Needs assistance for use of environmental cues. Needs  assistance for use of memory aids. Unaware of daily routine.    Therapy Mode Minutes  Occupational Therapy: Branch  Physical Therapy: Branch  Speech Language Pathology:  Branch    Signed by: Eda Allen RN

## 2019-08-02 NOTE — PROGRESS NOTES
Results from last 7 days   Lab Units 08/02/19  0720 08/01/19 2023 08/01/19  1425   HEMOGLOBIN g/dL 9.0* 8.6* 7.8*     Results for NOMAN TEIXEIRA (MRN [id number]) as of 8/2/2019 09:16   Ref. Range 8/1/2019 19:11   Fecal Occult Blood Latest Ref Range: Negative  Positive (A)       Hgb improved after transfusion 1 unit PRBC.  Stool heme positive.  Will consult GI

## 2019-08-02 NOTE — PROGRESS NOTES
Inpatient Rehabilitation Plan of Care Note    Plan of Care  Care Plan Reviewed - No updates at this time.    Psychosocial    Performed Intervention(s)  Assist patient to express needs and concerns      Safety    Performed Intervention(s)  Bed alarm, wc alarm  Safety rounds  Items within reach      Body Systems    Performed Intervention(s)  Daily skin inspection      Sphincter Control    Performed Intervention(s)  Monitor intake and output  Encourage fluid intake  Elimination schedule    Signed by: Eda Allen RN

## 2019-08-02 NOTE — THERAPY TREATMENT NOTE
Inpatient Rehabilitation - Speech Language Pathology Treatment Note  [facility]     Patient Name: Darby Workman  : 1945  MRN: [id number]  Today's Date: 2019         Admit Date: 2019    Visit Dx:    No diagnosis found.  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)   • Right-sided headache   • Acute ischemic stroke (CMS/HCC)   • Embolic stroke (CMS/HCC)   • Cerebral infarction due to stenosis of left carotid artery (CMS/HCC)   • Anticoagulated by anticoagulation treatment   • Stroke (cerebrum) (CMS/HCC)        Therapy Treatment  Rehabilitation Treatment Summary     Row Name                Wound 19 1015 Left neck incision    Wound - Properties Group Date first assessed: 19 [LD] Time first assessed:  [LD] Side: Left [LD] Location: neck [LD] Type: incision [LD] Recorded by:  [LD] Martha Foster RN 19 1015      User Key  (r) = Recorded By, (t) = Taken By, (c) = Cosigned By    Initials Name Effective Dates Discipline    LD Martha Foster RN 16 -  Nurse          EDUCATION  The patient has been educated in the following areas:   Communication Impairment Dysphagia (Swallowing Impairment).    SLP Recommendation and Plan                         SLP GOALS     Row Name 19 1200 19 1230          Oral Nutrition/Hydration Goal 1 (SLP)    Oral Nutrition/Hydration Goal 1, SLP  Pt will accept PO.  -SH  --     Progress/Outcomes (Oral Nutrition/Hydration Goal 1, SLP)  goal met  -SH  --        Oral Nutrition/Hydration Goal 2 (SLP)    Oral Nutrition/Hydration Goal 2, SLP  Pt will tolerate honey and puree without overt s/s of aspiration, pocketing, or anterior loss across three meal observations.  -SH  --     Time Frame (Oral Nutrition/Hydration Goal 2, SLP)  by discharge  -SH  --     Barriers (Oral  Nutrition/Hydration Goal 2, SLP)  Progress: Minimal intake this date during meal obs. No overt s/s of aspiration with honey via cup, but prior to PO, patient was noted to throat clear with wet voice. Slow AP transit with puree. Anterior loss noted with puree and honey this date. No oral residue post swallow.   -SH  --     Progress/Outcomes (Oral Nutrition/Hydration Goal 2, SLP)  good progress toward goal  -SH  --        Words/Phrases/Sentences Goal 1 (SLP)    Improve Ability to Comprehend Words/Phrases/Sentences Through: Goal 1 (SLP)  --  identify body part;identify familiar objects;identified by name;90%;independently (over 90% accuracy)  -KB     Time Frame (Identify Objects and Pictures Goal 1, SLP)  --  by discharge  -KB        Comprehend Questions Goal 1 (SLP)    Improve Ability to Comprehend Questions Goal 1 (SLP)  --  questions about personal information;simple yes/no questions;simple wh questions;simple general questions;90%;independently (over 90% accuracy)  -KB     Time Frame (Comprehend Questions Goal 1, SLP)  --  by discharge  -KB        Word Retrieval Skills Goal 1 (SLP)    Improve Word Retrieval Skills By Goal 1 (SLP)  completing automatic speech task, counting;completing automatic speech task, alphabet;completing automatic speech task, days of the week;completing automatic speech task, months;completing automatic speech task, sing “Happy Birthday”  -  completing automatic speech task, counting;completing automatic speech task, alphabet;completing automatic speech task, days of the week;completing automatic speech task, months;completing automatic speech task, sing “Happy Birthday”  -KB     Time Frame (Word Retrieval Goal 1, SLP)  by discharge  -  by discharge  -KB     Progress/Outcomes (Word Retrieval Goal 1, SLP)  goal ongoing  -SH  --     Comment (Word Retrieval Goal 1, SLP)  Automatics nursey songs-70% with phonemic paraphasias. Counting 1-10 with MAX verbal and visual cues-20%. Saying the  alphabet-30% acc with MAX verbal and visual cues Imitating vowels-75%, words-20%  -  --        Word Retrieval Skills Goal 2 (SLP)    Improve Word Retrieval Skills By Goal 2 (SLP)  --  confrontational naming task;high frequency;completing open ended structured sentence;90%;independently (over 90% accuracy)  -KB     Time Frame (Word Retrieval Goal 2, SLP)  --  by discharge  -KB        Graphic Expression of Shapes, Letters, Numbers Goal 1 (SLP)    Improve Graphic Expression of Shapes, Letters, and Numbers Goal 1 (SLP)  --  copy shapes, numbers, and letters;writing dictated number and letters;90%;independently (over 90% accuracy)  -KB        Planning and Execution of Connected Speech Goal 1 (SLP)    Improve Planning and Execution of Connected Speech by Goal 1 (SLP)  --  imitating consonant-vowel combos;imitating one syllable words;imitating two syllable words;completing open-ended sentences;repeating phrases;90%;independently (over 90% accuracy)  -KB        Augmentative/Alternative Communication Objectives Goal 1 (SLP    Communication (Augmentative/Alternative Communication Goal 1, SLP)  --  improve ability to use non-verbal communication strategies;improve ability to use low tech augmentative/alternative communication device  -KB     Improve Communication by (Augmentative/Alternative Communication Goal 1, SLP)  --  use gestures to communicate;use gesture with object to communicate;alphabet/picture board;90%;independently (over 90% accuracy)  -KB     Time Frame (Augmentative/Alternative Communication Goal 1, SLP)  --  by discharge  -KB        Additional Goal 1 (SLP)    Additional Goal 1, SLP  --  Complete diagnostic treatment tasks for cognition.   -KB       User Key  (r) = Recorded By, (t) = Taken By, (c) = Cosigned By    Initials Name Provider Type     Mary Brody MS CCC-SLP Speech and Language Pathologist    KB Bruton, Katherine L Speech and Language Pathologist           SLP Outcome Measures (last 72 hours)       SLP Outcome Measures     Row Name 08/01/19 1600 07/31/19 1200 07/30/19 1500       SLP Outcome Measures    Outcome Measure Used?  Adult NOMS  -KB  Adult NOMS  -AW  Adult NOMS  -ML       Adult FCM Scores    FCM Chosen  Auditory Comprehension;Verbal Expression  -KB  Swallowing  -AW  Swallowing  -ML    Swallowing FCM Score  --  3  -AW  1  -ML    Auditory Comp Score FCM - (Spoken Language)  2  -KB  --  --    Verbal Expression FCM Score  2  -KB  --  --      User Key  (r) = Recorded By, (t) = Taken By, (c) = Cosigned By    Initials Name Effective Dates    Ingris White, MS CCC-SLP 06/08/18 -     KB Bruton, Kathernorma THORNTON 03/07/18 -     ML GreggPatsy morales, MS CCC-SLP 10/04/18 -             Time Calculation:     Time Calculation- SLP     Row Name 08/02/19 1231 08/02/19 1000          Time Calculation- [facility]    SLP Start Time  1130  -  0930  -     SLP Stop Time  1200  -SH  1000  -     SLP Time Calculation (min)  30 min  -  30 min  -     SLP Received On  08/02/19  -  08/02/19  -       User Key  (r) = Recorded By, (t) = Taken By, (c) = Cosigned By    Initials Name Provider Type     Mary Brody MS CCC-SLP Speech and Language Pathologist          Therapy Charges for Today     Code Description Service Date Service Provider Modifiers Qty    25833756472 HC ST TREATMENT SWALLOW 2 8/2/2019 Mary Brody MS CCC-SLP GN 1    12123216305 HC ST TREATMENT SPEECH 2 8/2/2019 Mary rBody MS CCC-SLP GN 1             ADULT NOMS (last 72 hours)      Adult NOMS     Row Name 08/01/19 1600 07/31/19 1200 07/30/19 1500             Adult FCM Scores    FCM Chosen  Auditory Comprehension;Verbal Expression  -KB  Swallowing  -AW  Swallowing  -ML      Swallowing FCM Score  --  3  -AW  1  -ML      Auditory Comp Score FCM - (Spoken Language)  2  -KB  --  --      Verbal Expression FCM Score  2  -KB  --  --        User Key  (r) = Recorded By, (t) = Taken By, (c) = Cosigned By    Initials Name Effective Dates    Ingris White, MS  CCC-SLP 18 -     PIETER Bruton, Katherine HILLARY 18 -     ML CristinoPatsy, MS CCC-SLP 10/04/18 -                  Mary Brody, MS CCC-SLP  2019   and Inpatient Rehabilitation - Speech Language Pathology   Swallow Treatment Note [facility]     Patient Name: Darby Workman  : 1945  MRN: [id number]  Today's Date: 2019               Admit Date: 2019    Visit Dx:    No diagnosis found.  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)   • Right-sided headache   • Acute ischemic stroke (CMS/HCC)   • Embolic stroke (CMS/HCC)   • Cerebral infarction due to stenosis of left carotid artery (CMS/HCC)   • Anticoagulated by anticoagulation treatment   • Stroke (cerebrum) (CMS/HCC)       Therapy Treatment  Rehabilitation Treatment Summary     Row Name                Wound 19 1015 Left neck incision    Wound - Properties Group Date first assessed: 19 [LD] Time first assessed: 1015 [LD] Side: Left [LD] Location: neck [LD] Type: incision [LD] Recorded by:  [LD] Martha Foster RN 19 1015      User Key  (r) = Recorded By, (t) = Taken By, (c) = Cosigned By    Initials Name Effective Dates Discipline    LD Martha Foster RN 16 -  Nurse          Outcome Summary         SLP GOALS     Row Name 19 1200 19 1230          Oral Nutrition/Hydration Goal 1 (SLP)    Oral Nutrition/Hydration Goal 1, SLP  Pt will accept PO.  -SH  --     Progress/Outcomes (Oral Nutrition/Hydration Goal 1, SLP)  goal met  -SH  --        Oral Nutrition/Hydration Goal 2 (SLP)    Oral Nutrition/Hydration Goal 2, SLP  Pt will tolerate honey and puree without overt s/s of aspiration, pocketing, or anterior loss across three meal observations.  -SH  --     Time Frame (Oral Nutrition/Hydration Goal 2, SLP)  by discharge  -SH   --     Barriers (Oral Nutrition/Hydration Goal 2, SLP)  Progress: Minimal intake this date during meal obs. No overt s/s of aspiration with honey via cup, but prior to PO, patient was noted to throat clear with wet voice. Slow AP transit with puree. Anterior loss noted with puree and honey this date. No oral residue post swallow.   -SH  --     Progress/Outcomes (Oral Nutrition/Hydration Goal 2, SLP)  good progress toward goal  -SH  --        Words/Phrases/Sentences Goal 1 (SLP)    Improve Ability to Comprehend Words/Phrases/Sentences Through: Goal 1 (SLP)  --  identify body part;identify familiar objects;identified by name;90%;independently (over 90% accuracy)  -KB     Time Frame (Identify Objects and Pictures Goal 1, SLP)  --  by discharge  -KB        Comprehend Questions Goal 1 (SLP)    Improve Ability to Comprehend Questions Goal 1 (SLP)  --  questions about personal information;simple yes/no questions;simple wh questions;simple general questions;90%;independently (over 90% accuracy)  -KB     Time Frame (Comprehend Questions Goal 1, SLP)  --  by discharge  -KB        Word Retrieval Skills Goal 1 (SLP)    Improve Word Retrieval Skills By Goal 1 (SLP)  completing automatic speech task, counting;completing automatic speech task, alphabet;completing automatic speech task, days of the week;completing automatic speech task, months;completing automatic speech task, sing “Happy Birthday”  -  completing automatic speech task, counting;completing automatic speech task, alphabet;completing automatic speech task, days of the week;completing automatic speech task, months;completing automatic speech task, sing “Happy Birthday”  -KB     Time Frame (Word Retrieval Goal 1, SLP)  by discharge  -  by discharge  -KB     Progress/Outcomes (Word Retrieval Goal 1, SLP)  goal ongoing  -SH  --     Comment (Word Retrieval Goal 1, SLP)  Automatics nursey songs-70% with phonemic paraphasias. Counting 1-10 with MAX verbal and visual  cues-20%. Saying the alphabet-30% acc with MAX verbal and visual cues Imitating vowels-75%, words-20%  -  --        Word Retrieval Skills Goal 2 (SLP)    Improve Word Retrieval Skills By Goal 2 (SLP)  --  confrontational naming task;high frequency;completing open ended structured sentence;90%;independently (over 90% accuracy)  -KB     Time Frame (Word Retrieval Goal 2, SLP)  --  by discharge  -KB        Graphic Expression of Shapes, Letters, Numbers Goal 1 (SLP)    Improve Graphic Expression of Shapes, Letters, and Numbers Goal 1 (SLP)  --  copy shapes, numbers, and letters;writing dictated number and letters;90%;independently (over 90% accuracy)  -KB        Planning and Execution of Connected Speech Goal 1 (SLP)    Improve Planning and Execution of Connected Speech by Goal 1 (SLP)  --  imitating consonant-vowel combos;imitating one syllable words;imitating two syllable words;completing open-ended sentences;repeating phrases;90%;independently (over 90% accuracy)  -KB        Augmentative/Alternative Communication Objectives Goal 1 (SLP    Communication (Augmentative/Alternative Communication Goal 1, SLP)  --  improve ability to use non-verbal communication strategies;improve ability to use low tech augmentative/alternative communication device  -KB     Improve Communication by (Augmentative/Alternative Communication Goal 1, SLP)  --  use gestures to communicate;use gesture with object to communicate;alphabet/picture board;90%;independently (over 90% accuracy)  -KB     Time Frame (Augmentative/Alternative Communication Goal 1, SLP)  --  by discharge  -KB        Additional Goal 1 (SLP)    Additional Goal 1, SLP  --  Complete diagnostic treatment tasks for cognition.   -KB       User Key  (r) = Recorded By, (t) = Taken By, (c) = Cosigned By    Initials Name Provider Type     Mary Brody MS CCC-SLP Speech and Language Pathologist     Bruton, Katherine L Speech and Language Pathologist          EDUCATION  The  patient has been educated in the following areas:   Communication Impairment Dysphagia (Swallowing Impairment).    SLP Recommendation and Plan                            SLP Outcome Measures (last 72 hours)      SLP Outcome Measures     Row Name 08/01/19 1600 07/31/19 1200 07/30/19 1500       SLP Outcome Measures    Outcome Measure Used?  Adult NOMS  -KB  Adult NOMS  -AW  Adult NOMS  -ML       Adult FCM Scores    FCM Chosen  Auditory Comprehension;Verbal Expression  -KB  Swallowing  -AW  Swallowing  -ML    Swallowing FCM Score  --  3  -AW  1  -ML    Auditory Comp Score FCM - (Spoken Language)  2  -KB  --  --    Verbal Expression FCM Score  2  -KB  --  --      User Key  (r) = Recorded By, (t) = Taken By, (c) = Cosigned By    Initials Name Effective Dates    AW Ingris Gomez, MS CCC-SLP 06/08/18 -     KB Bruton, Katherine L 03/07/18 -     ML Cristino Patsy, MS CCC-SLP 10/04/18 -              Time Calculation:   Time Calculation- SLP     Row Name 08/02/19 1231 08/02/19 1000          Time Calculation- [facility]    SLP Start Time  1130  -  0930  -     SLP Stop Time  1200  -SH  1000  -     SLP Time Calculation (min)  30 min  -  30 min  -     SLP Received On  08/02/19  -  08/02/19  -       User Key  (r) = Recorded By, (t) = Taken By, (c) = Cosigned By    Initials Name Provider Type     Mary Brody MS CCC-SLP Speech and Language Pathologist          Therapy Charges for Today     Code Description Service Date Service Provider Modifiers Qty    08452835781 HC ST TREATMENT SWALLOW 2 8/2/2019 Mary Brody MS CCC-SLP GN 1    19137571383 HC ST TREATMENT SPEECH 2 8/2/2019 Mary Brody MS CCC-SLP GN 1                 Mary Brody MS CCC-SLP  8/2/2019

## 2019-08-02 NOTE — PROGRESS NOTES
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  Neuro stimulation-amantadine added July 27  ?  Dysphagia-Cortrak feeding tube?discontinued on July 31 and started on  pur?ed/honey thick liquid diet with strategies  ?  History of multiple bilateral strokes and left central retinal artery occlusion  ?  History of left greater than right internal carotid artery stenosis-status post  left internal carotid artery stent July 17, 2019  ?  History of hypertension?-?Hyzaar/ nebivolol  ?  History of diabetes mellitus?-Lantus/glipizide  August 1-blood sugars were low yesterday afternoon after tube feeds  discontinued.  Held glipizide last night and this morning and Lantus last night.   Blood sugar elevated at noontime today.  Will receive resume glipizide at a  lower dose of 5 mg twice a day with meals.  Continue sliding scale insulin  coverage.  She also is on metformin at home.  ?  Stroke prophylaxis-aspirin/Eliquis/atorvastatin  ?  Anemia-August 1-hemoglobin 7.4.  Most recent check on July 23 HGB 9.3.  Will  recheck hemoglobin this afternoon.  Hemoccult stool.  Iron studies.  Reticulocyte count.  Recheck CBC in the a.m.  Added Protonix.  Patient is on  aspirin and Eliquis.  With recent stroke  will look to transfuse packed red  blood cell.  ?  DVT prophylaxis-SCDs/anticoagulation  ?  Impulsivity-  ?Nursing to do re-orientation with the patient. ?Room close to the  nursing station.  ?  Endocrine-vitamin D deficiency-ergocalciferol 50,000 units weekly x8 weeks  added.?Vitamin B12 level and TSH checked and late May unremarkable  ?            Plan of Care  Anticipated Discharge Date/Estimated Length of Stay: [facility]: 4 weeks  Anticipated Discharge Destination: Community  discharge with assistance  Discharge Plan : Home with assist of family  Medical Necessity Expected Level Rationale: Estimated level of assist after  discharge: MIN  Rehab prognosis: indeterminate  Medical prognosis: indeterminate  Intensity and Duration: an average of 3 hours/5 days per week  Medical Supervision and 24 Hour Rehab Nursing: x  Physical Therapy: x  PT Intensity/Duration: 60 minutes/day, 5 days/week for 28 days  Occupational Therapy: x  OT Intensity/Duration: 60 minutes/day, 5 days/week for 28 days  Speech and Language Therapy: x  SLP Intensity/Duration: 60 minutes/day, 5 days/week for 28 days  Social Work: x  Therapeutic Recreation: x  Psychology: x  Updated (if changes indicated)  No changes to plan.    Based on the patient's medical and functional status, their prognosis and  expected level of functional improvement is: Estimated level of assist after  discharge: MIN  Rehab prognosis: indeterminate  Medical prognosis: indeterminate    Interdisciplinary Problem/Goals/Status  Copy from POC    Comments:    Signed by: Ajit Howard MD

## 2019-08-02 NOTE — THERAPY TREATMENT NOTE
Inpatient Rehabilitation - Physical Therapy Treatment Note  [facility]     Patient Name: Darby Workman  : 1945  MRN: [id number]    Today's Date: 2019                 Admit Date: 2019      Visit Dx:    No diagnosis found.    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)   • Right-sided headache   • Acute ischemic stroke (CMS/HCC)   • Embolic stroke (CMS/HCC)   • Cerebral infarction due to stenosis of left carotid artery (CMS/HCC)   • Anticoagulated by anticoagulation treatment   • Stroke (cerebrum) (CMS/HCC)       Therapy Treatment    IRF Treatment Summary     Row Name 19 1200 19 1042          Evaluation/Treatment Time and Intent    Subjective Information  no complaints  -SH  no complaints  -MD     Existing Precautions/Restrictions  fall  -  fall  -MD     Document Type  therapy note (daily note)  -  therapy note (daily note)  -MD     Mode of Treatment  speech-language pathology  -SH  physical therapy  -MD     Patient/Family Observations  Pt sitting in WC showing no signs of distress or pain  -  Pt seated in WC showing no sign of acute distress.   -MD     Recorded by [SH] Mary Brody MS CCC-SLP [MD] Mira Chadwick, PT     Row Name 19 1042             Cognition/Psychosocial- PT/OT    Orientation Status (Cognition)  unable/difficult to assess  -MD      Follows Commands (Cognition)  follows one step commands;0-24% accuracy;increased processing time needed;initiation impaired;physical/tactile prompts required;repetition of directions required;verbal cues/prompting required  -MD      Personal Safety Interventions  fall prevention program maintained;gait belt  -MD      Recorded by [MD] Mira Chadwick, PT      Row Name 19 1042             Sit-Stand Transfer    Sit-Stand [place] (Transfers)  verbal  cues;minimum assist (75% patient effort)  -MD      Recorded by [MD] Mira Chadwick, PT      Row Name 08/02/19 1042             Stand-Sit Transfer    Stand-Sit [place]tholome[place] (Transfers)  verbal cues;minimum assist (75% patient effort)  -MD      Recorded by [MD] Mira Chadwick, PT      Row Name 08/02/19 1042             Gait/Stairs Assessment/Training    Barth[place]omew Level (Gait)  verbal cues;minimum assist (75% patient effort)  -MD      Assistive Device (Gait)  -- HHA  -MD      Distance in Feet (Gait)  80x2 and 200` x1  -MD      Pattern (Gait)  step-through  -MD      Deviations/Abnormal Patterns (Gait)  ataxic;gait speed decreased;stride length decreased;eliza decreased  -MD      Bilateral Gait Deviations  heel strike decreased  -MD      Negotiation (Stairs)  stairs independence;handrail location;number of steps;ascending technique;descending technique  -MD      Bartholomew Level (Stairs)  verbal cues;minimum assist (75% patient effort)  -MD      Handrail Location (Stairs)  both sides  -MD      Number of Steps (Stairs)  4  -MD      Ascending Technique (Stairs)  step-over-step  -MD      Descending Technique (Stairs)  step-to-step  -MD      Recorded by [MD] Mira Chadwick, PT      Row Name 08/02/19 1042             Pain Scale: Numbers Pre/Post-Treatment    Pain Scale: Numbers, Pretreatment  0/10 - no pain  -MD      Recorded by [MD] Mira Chadwick, PT      Row Name 08/02/19 1042             Therapeutic Exercise    Therapeutic Exercise  seated, lower extremities  -MD      Recorded by [MD] Mira Chadwick, PT      Row Name 08/02/19 1042             Lower Extremity Seated Therapeutic Exercise    Performed, Seated Lower Extremity (Therapeutic Exercise)  hip flexion/extension;knee flexion/extension  -MD      Exercise Type, Seated Lower Extremity (Therapeutic Exercise)  AAROM (active assistive range of motion)  -MD      Sets/Reps Detail, Seated Lower Extremity (Therapeutic Exercise)  1/10  -MD      Comment, Seated Lower Extremity (Therapeutic  Exercise)  max VC/TC as well as PT demonstrating and doing ther ex w pt.  -MD      Recorded by [MD] Mira Chadwick PT      Row Name 08/02/19 1042             Positioning and Restraints    Pre-Treatment Position  sitting in chair/recliner  -MD      Post Treatment Position  wheelchair  -MD      In Wheelchair  sitting;exit alarm on;patient within staff view  -MD      Recorded by [MD] Mira Chadwick PT        User Key  (r) = Recorded By, (t) = Taken By, (c) = Cosigned By    Initials Name Effective Dates    Mira Vaca, PT 04/03/18 -     SH Mary Brody MS CCC-SLP 03/07/18 -         Wound 07/17/19 1015 Left neck incision (Active)   Dressing Appearance open to air 8/2/2019  8:00 AM   Closure Liquid skin adhesive 8/2/2019  8:00 AM   Base clean;dry 8/1/2019  7:41 PM   Drainage Amount none 8/2/2019  8:00 AM   Dressing Care, Wound open to air 8/2/2019  8:00 AM     Physical Therapy Education     Title: PT OT SLP Therapies (Not Started)     Topic: Physical Therapy (In Progress)     Point: Precautions (In Progress)     Learning Progress Summary           Patient Acceptance, E, NR by MD at 8/2/2019 10:44 AM    Acceptance, E,D, NR by MD at 8/1/2019 12:16 PM                               User Key     Initials Effective Dates Name Provider Type Discipline    MD 04/03/18 -  Mira Chadwick PT Physical Therapist PT                  PT Recommendation and Plan  Anticipated Equipment Needs at Discharge (PT Eval): front wheeled walker  Frequency of Treatment (PT Eval): 5 times per week, 60 minutes per session              Outcome Measures     Row Name 07/31/19 1407 07/30/19 1500          How much difficulty does the patient currently have...    Turning from your back to your side while in flat bed without using bedrails?  --  3  -EH     Standing up from a chair using your arms (e.g., wheelchair, bedside chair)?  --  3  -EH     Moving from lying on back to sitting on the side of a flat bed without bedrails?  --  3  -EH        How much help from  another person do you currently need...    Moving to and from a bed to a chair (including a wheelchair)?  --  2  -EH     Climbing 3-5 steps with a railing?  --  1  -     To walk in hospital room?  --  2  -     AM-PAC 6 Clicks Score (PT)  --  14  -        How much help from another is currently needed...    Putting on and taking off regular lower body clothing?  1  -  --     Bathing (including washing, rinsing, and drying)  1  -  --     Toileting (which includes using toilet bed pan or urinal)  1  -  --     Putting on and taking off regular upper body clothing  2  -  --     Taking care of personal grooming (such as brushing teeth)  2  -  --     Eating meals  2  -  --     AM-PAC 6 Clicks Score (OT)  9  -KP  --        Modified [place] Scale    Modified [place] Scale  --  4 - Moderately severe disability.  Unable to walk without assistance, and unable to attend to own bodily needs without assistance.  -        Functional Assessment    Outcome Measure Options  AM-PAC 6 Clicks Daily Activity (OT)  -  --       User Key  (r) = Recorded By, (t) = Taken By, (c) = Cosigned By    Initials Name Provider Type    Idalmis Pearl, OTR Occupational Therapist     Alisha Ash, DANIEL Physical Therapy Assistant             Time Calculation:     PT Charges     Row Name 08/02/19 1340 08/02/19 1041          Time Calculation    Start Time  1300  -MD  1030  -MD     Stop Time  1330  -MD  1100  -MD     Time Calculation (min)  30 min  -MD  30 min  -MD     PT Received On  --  08/02/19  -MD     PT - Next Appointment  --  08/03/19  -MD       User Key  (r) = Recorded By, (t) = Taken By, (c) = Cosigned By    Initials Name Provider Type    Mira Vaca, PT Physical Therapist          Therapy Charges for Today     Code Description Service Date Service Provider Modifiers Qty    62878122662 HC PT EVAL LOW COMPLEXITY 2 8/1/2019 Mira Chadwick, PT GP 1    76512923906 HC PT THER PROC EA 15 MIN 8/1/2019 Mira Chadwick, PT GP 3     [id number]  PT THER PROC EA 15 MIN 8/2/2019 Mira Chadwick, PT GP 4    [id number]  PT THER SUPP EA 15 MIN 8/2/2019 Mira Chadwick, PT GP 1                   Mira Chadwick, PT  8/2/2019

## 2019-08-02 NOTE — PROGRESS NOTES
Occupational Therapy: Branch    Physical Therapy: Individual: 60 minutes.    Speech Language Pathology:  [place]    Signed by: Mira Chadwick PT

## 2019-08-02 NOTE — PROGRESS NOTES
Inpatient Rehabilitation Functional Measures Assessment    Functional Measures  KATI Eating:  Branch  KATI Grooming: [place]  KATI Bathing:  Branch  KATI Upper Body Dressing:  [facility] Lower Body Dressing:  [facility] Toileting:  [facility] Bladder Management  Level of Assistance:  [place]  Frequency/Number of Accidents this Shift:  [facility] Bowel Management  Level of Assistance: [place]  Frequency/Number of Accidents this Shift: [place]    [facility] Bed/Chair/Wheelchair Transfer:  Activity was not observed.  KATI Toilet Transfer:  [facility] Tub/Shower Transfer:  [place]    Previously Documented Mode of Locomotion at Discharge: Field  KATI Expected Mode of Locomotion at Discharge: [facility] Walk/Wheelchair:  WHEELCHAIR OBSERVATION   Activity was not observed.    WALK OBSERVATION   Walk Distance Scale = 2.  Distance walked is 50 -149 feet. Walk Score = 2.  Patient performs 75% or more of effort and requires minimal assistance.  Incidental assistance, contact guard or steadying was provided. Patient walked a  distance of 80 feet. No assistive devices were required.  KATI Stairs:  Stairs Score = 2.  Incidental assistance with lifting or lowering,  contact guard or steadying was provided. Patient performs 75% or more of effort  and requires minimal contact assistance. Patient negotiated  4 stairs. Patient  requires the following assistive device(s): Handrail(s).    KATI Comprehension:  Branch  KATI Expression:  Branch  KATI Social Interaction:  [facility] Problem Solving:  [facility] Memory:  [place]    Therapy Mode Minutes  Occupational Therapy: Branch  Physical Therapy: Branch  Speech Language Pathology:  [place]    Signed by: Mira Chadwick PT

## 2019-08-02 NOTE — PLAN OF CARE
Problem: Skin Injury Risk (Adult)  Goal: Skin Health and Integrity  Outcome: Ongoing (interventions implemented as appropriate)      Problem: Fall Risk (Adult)  Goal: Absence of Fall  Outcome: Ongoing (interventions implemented as appropriate)      Problem: Patient Care Overview  Goal: Plan of Care Review  Outcome: Ongoing (interventions implemented as appropriate)   08/02/19 0115   Patient Care Overview   IRF Plan of Care Review progress ongoing, continue   Progress, Functional Goals demonstrating adequate progress   Coping/Psychosocial   Plan of Care Reviewed With patient   OTHER   Outcome Summary Patient is calm and cooperative. Following simple instructions with verbal cueing. Having global aphasia. Taking medication in pudding. Dr. Barnes called to notify of fecal occult result and H&H result after 1 unit of blood. No new order received. Alarms patent.        Problem: Stroke (IRF) (Adult)  Goal: Promote Optimal Functional [place]  Outcome: Ongoing (interventions implemented as appropriate)

## 2019-08-02 NOTE — PROGRESS NOTES
Inpatient Rehabilitation Functional Measures Assessment    Functional Measures  KATI Eating:  [facility] Grooming: [facility] Bathing:  [facility] Upper Body Dressing:  [facility] Lower Body Dressing:  [facility] Toileting:  [facility] Bladder Management  Level of Assistance:  [place]  Frequency/Number of Accidents this Shift:  [facility] Bowel Management  Level of Assistance: [place]  Frequency/Number of Accidents this Shift: [facility] Bed/Chair/Wheelchair Transfer:  [facility] Toilet Transfer:  [facility] Tub/Shower Transfer:  [place]    Previously Documented Mode of Locomotion at Discharge: Field  KATI Expected Mode of Locomotion at Discharge: [facility] Walk/Wheelchair:  [facility] Stairs:  [facility] Comprehension:  Auditory comprehension is the usual mode. Comprehension  Score = 6, Modified [place].  Patient comprehends complex/abstract  information in their primary language with only mild difficulty.  KATI Expression:  Vocal expression is the usual mode. Expression Score = 6,  Modified Independent.  Patient expresses complex/abstract information in their  primary language with only mild difficulty with tasks.  KATI Social Interaction:  Social Interaction Score = 7, Independent. Patient is  completely independent for social interaction.  There are no activity  limitations.  KATI Problem Solving:  Problem Solving Score = 6, Modified [place].  Patient  makes appropriate decisions in order to solve complex problems, but requires  extra time.  KATI Memory:  Memory Score = 6, Modified [place].  Patient is modified  independent for memory, having only mild difficulty and using self-initiated or  environmental cues to remember.    Therapy Mode Minutes  Occupational Therapy: Branch  Physical Therapy: [place]  Speech Language Pathology:  [place]    Signed by: Rogers Erickson RN

## 2019-08-02 NOTE — PLAN OF CARE
Problem: Fall Risk (Adult)  Goal: Absence of Fall  Outcome: Ongoing (interventions implemented as appropriate)   08/02/19 1653   Fall Risk (Adult)   Absence of Fall making progress toward outcome       Problem: Patient Care Overview  Goal: Plan of Care Review  Outcome: Ongoing (interventions implemented as appropriate)   08/02/19 1653   Patient Care Overview   IRF Plan of Care Review progress ongoing, continue   Progress, Functional Goals demonstrating adequate progress   Coping/Psychosocial   Plan of Care Reviewed With patient   OTHER   Outcome Summary dining room for meals. quite impulsive. severe global aphasia and difficulty following directions. Dr. Howard gave order to stop [facility] after shift change tonight.       Problem: Stroke (IRF) (Adult)  Goal: Promote Optimal Functional [place]  Outcome: Ongoing (interventions implemented as appropriate)   08/02/19 1653   Stroke (IRF) (Adult)   Promote Optimal Functional [place] demonstrating adequate progress

## 2019-08-02 NOTE — THERAPY TREATMENT NOTE
Inpatient Rehabilitation - Occupational Therapy Treatment Note    [facility]     Patient Name: Darby Workman  : 1945  MRN: [id number]    Today's Date: 2019                 Admit Date: 2019      Visit Dx:  No diagnosis found.    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)   • Right-sided headache   • Acute ischemic stroke (CMS/HCC)   • Embolic stroke (CMS/HCC)   • Cerebral infarction due to stenosis of left carotid artery (CMS/HCC)   • Anticoagulated by anticoagulation treatment   • Stroke (cerebrum) (CMS/HCC)         Therapy Treatment    IRF Treatment Summary     Row Name 19 1544 19 1200 19 1042       Evaluation/Treatment Time and Intent    Subjective Information  no complaints  -AF  no complaints  -SH  no complaints  -MD    Existing Precautions/Restrictions  fall  -AF  fall  -SH  fall  -MD    Document Type  therapy note (daily note)  -AF  therapy note (daily note)  -SH  therapy note (daily note)  -MD    Mode of Treatment  occupational therapy  -AF  speech-language pathology  -SH  physical therapy  -MD    Patient/Family Observations  pt sitting up in w/c in AM and PM at NS station  -AF  Pt sitting in WC showing no signs of distress or pain  -SH  Pt seated in WC showing no sign of acute distress.   -MD    Recorded by [AF] Ingris Ansari, OTR [SH] Mary Brody MS CCC-SLP [MD] Mira Chadwick, PT    Row Name 19 1544 19 1042          Cognition/Psychosocial- PT/OT    Affect/Mental Status (Cognitive)  unable/difficult to assess  -AF  --     Orientation Status (Cognition)  unable/difficult to assess  -AF  unable/difficult to assess  -MD     Follows Commands (Cognition)  follows one step commands;25-49% accuracy;delayed response/completion;increased processing time needed;initiation  impaired;physical/tactile prompts required;repetition of directions required;verbal cues/prompting required  -AF  follows one step commands;0-24% accuracy;increased processing time needed;initiation impaired;physical/tactile prompts required;repetition of directions required;verbal cues/prompting required  -MD     Personal Safety Interventions  fall prevention program maintained;gait belt;nonskid shoes/slippers when out of bed  -AF  fall prevention program maintained;gait belt  -MD     Recorded by [AF] Ingris Ansari OTR [MD] Mira Chadwick, PT     Row Name 08/02/19 1042             Sit-Stand Transfer    Sit-Stand [place] (Transfers)  verbal cues;minimum assist (75% patient effort)  -MD      Recorded by [MD] Mira Chadwick, HESHAM      Row Name 08/02/19 1042             Stand-Sit Transfer    Stand-Sit [place] (Transfers)  verbal cues;minimum assist (75% patient effort)  -MD      Recorded by [MD] Mira Chadwick, PT      Row Name 08/02/19 1544             Toilet Transfer    Type (Toilet Transfer)  stand pivot/stand step  -AF      [place] Level (Toilet Transfer)  minimum assist (75% patient effort);verbal cues  -AF      Assistive Device (Toilet Transfer)  commode;grab bars/safety frame;wheelchair  -AF      Recorded by [AF] Ingrsi Ansari OTR      Row Name 08/02/19 1042             Gait/Stairs Assessment/Training    [place] Level (Gait)  verbal cues;minimum assist (75% patient effort)  -MD      Assistive Device (Gait)  -- HHA  -MD      Distance in Feet (Gait)  80x2 and 200` x1  -MD      Pattern (Gait)  step-through  -MD      Deviations/Abnormal Patterns (Gait)  ataxic;gait speed decreased;stride length decreased;eliza decreased  -MD      Bilateral Gait Deviations  heel strike decreased  -MD      Negotiation (Stairs)  stairs independence;handrail location;number of steps;ascending technique;descending technique  -MD      [place] Level (Stairs)  verbal cues;minimum assist (75% patient effort)  -MD       Handrail Location (Stairs)  both sides  -MD      Number of Steps (Stairs)  4  -MD      Ascending Technique (Stairs)  step-over-step  -MD      Descending Technique (Stairs)  step-to-step  -MD      Recorded by [MD] Mira Chadwick, PT      Row Name 08/02/19 1544             Bathing Assessment/Treatment    Bathing [place] Level  upper body;minimum assist (75% patient effort);verbal cues  -AF      Bathing Position  sink side;supported sitting  -AF      Comment (Bathing)  diffiuclty with iniating task  -AF      Recorded by [AF] Ingris Ansari, OTR      Row Name 08/02/19 1544             Upper Body Dressing Assessment/Treatment    Upper Body Dressing Task  upper body dressing skills;pull over garment;front opening garment;minimum assist (75% or more patient effort);verbal cues  -AF      Upper Body Dressing Position  supported sitting  -AF      Recorded by [AF] Ingris Ansari, OTR      Row Name 08/02/19 1544             Lower Body Dressing Assessment/Treatment    Comment (Lower Body Dressing)  completed prior to OT   -AF      Recorded by [AF] Ingris Ansari, OTR      Row Name 08/02/19 1544             Grooming Assessment/Treatment    Grooming [place] Level  grooming skills;moderate assist (50% patient effort);verbal cues  -AF      Grooming Position  supported sitting;sink side  -AF      Comment (Grooming)  place comb in mouth, with set up of toothbrush able to brush teeth  -AF      Recorded by [AF] Ingris Ansari, OTR      Row Name 08/02/19 1544             Vision Assessment/Intervention    Vision Assessment Comment  pt participated in visual perceptual task of completing foam flower puzzle with MOD vc's from therapist for completion. attended to task with min vc's seemed excited when she completed the puzzle  -AF      Recorded by [AF] Ingris Ansari, SALR      Row Name 08/02/19 1544 08/02/19 1042          Pain Scale: Numbers Pre/Post-Treatment    Pain Scale: Numbers, Pretreatment  0/10 - no pain  -AF   0/10 - no pain  -MD     Pain Scale: Numbers, Post-Treatment  0/10 - no pain  -AF  --     Recorded by [AF] Ingris Ansari OTR [MD] Mira Chadwick, PT     Row Name 08/02/19 1544 08/02/19 1042          Therapeutic Exercise    Therapeutic Exercise  standing, upper extremities  -AF  seated, lower extremities  -MD     Recorded by [AF] Ingris Ansari OTR [MD] Mira Chadwick, PT     Row Name 08/02/19 1544             Upper Extremity Standing Therapeutic Exercise    Device, Standing Upper Extremity (Therapeutic Exercise)  restorator/arm bike  -AF      Exercise Type, Standing Upper Extremity (Therapeutic Exercise)  AROM (active range of motion)  -SEYMOUR      Sets/Reps Detail, Standing Upper Extremity (Therapeutic Exercise)  2 mins  -AF      Transfers Skills, Training to Functional Activity, Standing Upper Extremity (Therapeutic Exercise)  beginning to transfer skills to functional activity;unable to transfer skills at this time  -AF      Recorded by [AF] Ingris Ansari OTR      Row Name 08/02/19 1042             Lower Extremity Seated Therapeutic Exercise    Performed, Seated Lower Extremity (Therapeutic Exercise)  hip flexion/extension;knee flexion/extension  -MD      Exercise Type, Seated Lower Extremity (Therapeutic Exercise)  AAROM (active assistive range of motion)  -MD      Sets/Reps Detail, Seated Lower Extremity (Therapeutic Exercise)  1/10  -MD      Comment, Seated Lower Extremity (Therapeutic Exercise)  max VC/TC as well as PT demonstrating and doing ther ex w pt.  -MD      Recorded by [MD] Mira Chadwick, PT      Row Name 08/02/19 1544 08/02/19 1042          Positioning and Restraints    Pre-Treatment Position  sitting in chair/recliner  -AF  sitting in chair/recliner  -MD     Post Treatment Position  wheelchair  -AF  wheelchair  -MD     In Wheelchair  sitting;exit alarm on;patient within staff view in AM and PM sessions   -AF  sitting;exit alarm on;patient within staff view  -MD     Recorded by [AF] Ingris Ansari  OTR [MD] Mira Chadwick, PT       User Key  (r) = Recorded By, (t) = Taken By, (c) = Cosigned By    Initials Name Effective Dates    AF AnsariIngris martinez Ronit, OTR 04/03/18 -     Mira Vaca, PT 04/03/18 -     Mary Boone MS CCC-SLP 03/07/18 -           Wound 07/17/19 1015 Left neck incision (Active)   Dressing Appearance open to air 8/2/2019  8:00 AM   Closure Liquid skin adhesive 8/2/2019  8:00 AM   Base clean;dry 8/1/2019  7:41 PM   Drainage Amount none 8/2/2019  8:00 AM   Dressing Care, Wound open to air 8/2/2019  8:00 AM         OT Recommendation and Plan    Anticipated Equipment Needs At Discharge (OT Eval): bathing equipment  Planned Therapy Interventions (OT Eval): activity tolerance training, BADL retraining, cognitive/visual perception retraining, functional balance retraining, neuromuscular control/coordination retraining, occupation/activity based interventions, patient/caregiver education/training, ROM/therapeutic exercise, strengthening exercise, transfer/mobility retraining            OT IRF GOALS     Row Name 08/01/19 1159 07/22/19 1354          Transfer Goal 1 (OT-IRF)    Activity/Assistive Device (Transfer Goal 1, OT-IRF)  toilet;shower chair;walk-in shower  -AF  --     [place] Level (Transfer Goal 1, OT-IRF)  verbal cues required;minimum assist (75% or more patient effort);contact guard assist  -AF  --     Time Frame (Transfer Goal 1, OT-IRF)  short term goal (STG)  -AF  --     Progress/Outcomes (Transfer Goal 1, OT-IRF)  goal ongoing  -AF  --        Transfer Goal 2 (OT-IRF)    Activity/Assistive Device (Transfer Goal 2, OT-IRF)  shower chair;walk-in shower;toilet  -AF  --     [place] Level (Transfer Goal 2, OT-IRF)  contact guard assist;verbal cues required  -AF  --     Time Frame (Transfer Goal 2, OT-IRF)  long term goal (LTG)  -AF  --     Progress/Outcomes (Transfer Goal 2, OT-IRF)  goal ongoing  -AF  --        Bathing Goal 1 (OT-IRF)    Activity/Device (Bathing Goal 1, OT-IRF)   bathing skills, all;grab bar/tub rail;hand-held shower spray hose;shower chair  -AF  --     [place] Level (Bathing Goal 1, OT-IRF)  minimum assist (75% or more patient effort);verbal cues required  -AF  --     Time Frame (Bathing Goal 1, OT-IRF)  short term goal (STG)  -AF  --     Progress/Outcomes (Bathing Goal 1, OT-IRF)  goal ongoing  -AF  --        Bathing Goal 2 (OT-IRF)    Activity/Device (Bathing Goal 2, OT-IRF)  bathing skills, all;grab bar/tub rail;hand-held shower spray hose;shower chair  -AF  --     [place] Level (Bathing Goal 2, OT-IRF)  contact guard assist;verbal cues required  -AF  --     Time Frame (Bathing Goal 2, OT-IRF)  long term goal (LTG)  -AF  --     Progress/Outcomes (Bathing Goal 2, OT-IRF)  goal ongoing  -AF  --        UB Dressing Goal 1 (OT-IRF)    Activity/Device (UB Dressing Goal 1, OT-IRF)  upper body dressing  -AF  --     [place] (UB Dress Goal 1, OT-IRF)  set-up required  -AF  --     Time Frame (UB Dressing Goal 1, OT-IRF)  long term goal (LTG)  -AF  --     Progress/Outcomes (UB Dressing Goal 1, OT-IRF)  goal ongoing  -AF  --        LB Dressing Goal 1 (OT-IRF)    Activity/Device (LB Dressing Goal 1, OT-IRF)  lower body dressing  -AF  --     [place] (LB Dressing Goal 1, OT-IRF)  moderate assist (50-74% patient effort);verbal cues required  -AF  --     Time Frame (LB Dressing Goal 1, OT-IRF)  short term goal (STG)  -AF  --     Progress/Outcomes (LB Dressing Goal 1, OT-IRF)  goal ongoing  -AF  --        LB Dressing Goal 2 (OT-IRF)    Activity/Device (LB Dressing Goal 2, OT-IRF)  lower body dressing  -AF  --     [place] (LB Dressing Goal 2, OT-IRF)  contact guard assist;verbal cues required  -AF  --     Time Frame (LB Dressing Goal 2, OT-IRF)  long term goal (LTG)  -AF  --     Progress/Outcomes (LB Dressing Goal 2, OT-IRF)  goal ongoing  -AF  --        Grooming Goal 1 (OT-IRF)    Activity/Device (Grooming Goal 1, OT-IRF)  grooming skills, all  -AF  --      [place] (Grooming Goal 1, OT-IRF)  minimum assist (75% or more patient effort);verbal cues required  -AF  --     Time Frame (Grooming Goal 1, OT-IRF)  short term goal (STG)  -AF  --     Progress/Outcomes (Grooming Goal 1, OT-IRF)  goal ongoing  -AF  --        Grooming Goal 2 (OT-IRF)    Activity/Device (Grooming Goal 2, OT-IRF)  grooming skills, all  -AF  --     [place] (Grooming Goal 2, OT-IRF)  supervision required;verbal cues required  -AF  --     Time Frame (Grooming Goal 2, OT-IRF)  long term goal (LTG)  -AF  --     Progress/Outcomes (Grooming Goal 2, OT-IRF)  goal ongoing  -AF  --        Toileting Goal 1 (OT-IRF)    Activity/Device (Toileting Goal 1, OT-IRF)  toileting skills, all;grab bar/safety frame;raised toilet seat  -AF  --     [place] Level (Toileting Goal 1, OT-IRF)  moderate assist (50-74% patient effort);verbal cues required  -AF  --     Time Frame (Toileting Goal 1, OT-IRF)  short term goal (STG)  -AF  --     Progress/Outcomes (Toileting Goal 1, OT-IRF)  goal ongoing  -AF  --        Toileting Goal 2 (OT-IRF)    Activity/Device (Toileting Goal 2, OT-IRF)  toileting skills, all;grab bar/safety frame;raised toilet seat  -AF  --     [place] Level (Toileting Goal 2, OT-IRF)  verbal cues required;contact guard assist  -AF  --     Time Frame (Toileting Goal 2, OT-IRF)  long term goal (LTG)  -AF  --     Progress/Outcomes (Toileting Goal 2, OT-IRF)  goal ongoing  -AF  --        Balance Goal 1 (OT)    Activity/Assistive Device (Balance Goal 1, OT)  --  -- Pt. to complete balance task on EOB to increase ADL skills   -VS     [place] Level/Cues Needed (Balance Goal 1, OT)  --  maximum assist (25-49% patient effort)  -VS     Time Frame (Balance Goal 1, OT)  --  short term goal (STG);1 week  -VS     Progress/Outcomes (Balance Goal 1, OT)  --  continuing progress toward goal  -VS        Caregiver Training Goal 1 (OT-IRF)    Caregiver Training Goal 1 (OT-IRF)  Family and patient to demo safe  technique with ADLs, transfers, HEP and adaptive equipment as needed   -AF  --     Time Frame (Caregiver Training Goal 1, OT-IRF)  long term goal (LTG)  -AF  --     Progress/Outcomes (Caregiver Training Goal 1, OT-IRF)  goal ongoing  -AF  --       User Key  (r) = Recorded By, (t) = Taken By, (c) = Cosigned By    Initials Name Provider Type    VS Noemi Bedoya OTR Occupational Therapist    Ingris Youssef OTR Occupational Therapist              Outcome Measures     Row Name 07/31/19 1407             How much help from another is currently needed...    Putting on and taking off regular lower body clothing?  1  -KP      Bathing (including washing, rinsing, and drying)  1  -KP      Toileting (which includes using toilet bed pan or urinal)  1  -KP      Putting on and taking off regular upper body clothing  2  -KP      Taking care of personal grooming (such as brushing teeth)  2  -KP      Eating meals  2  -KP      AM-PAC 6 Clicks Score (OT)  9  -KP         Functional Assessment    Outcome Measure Options  AM-PAC 6 Clicks Daily Activity (OT)  -KP        User Key  (r) = Recorded By, (t) = Taken By, (c) = Cosigned By    Initials Name Provider Type    Idalmis Pearl OTR Occupational Therapist             Time Calculation:     Time Calculation- OT     Row Name 08/02/19 1552 08/02/19 1550          Time Calculation- OT    OT Start Time  1400  -AF  0830  -AF     OT Stop Time  1430  -AF  0900  -AF     OT Time Calculation (min)  30 min  -AF  30 min  -AF       User Key  (r) = Recorded By, (t) = Taken By, (c) = Cosigned By    Initials Name Provider Type    Ingris Youssef OTR Occupational Therapist          Therapy Charges for Today     Code Description Service Date Service Provider Modifiers Qty    62053314116 HC OT EVAL MOD COMPLEXITY 2 8/1/2019 Ingris Ansari OTR GO 1    70113194601 HC OT NEUROMUSC RE EDUCATION EA 15 MIN 8/1/2019 Ingris Ansari OTR GO 1    83952578679 HC OT SELF CARE/MGMT/TRAIN  EA 15 MIN 8/1/2019 Ingris Ansari, OTR GO 1    78927655240 HC OT SELF CARE/MGMT/TRAIN EA 15 MIN 8/2/2019 Ingris Ansari, OTR GO 2    65995554148 HC OT NEUROMUSC RE EDUCATION EA 15 MIN 8/2/2019 Ingris Ansari, OTR GO 2                   Ingris Ansari OTR  8/2/2019

## 2019-08-02 NOTE — PROGRESS NOTES
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  She attempts some verbalizations, perseverates on yes.  Difficulty following commands.  Does not appear to indicate any headache or abdominal pain.  Continues more alert.  Patient discussed with gastroenterology    History taken from: patient chart RN    Objective     Vital Signs  Temp:  [98 °F (36.7 °C)-98.6 °F (37 °C)] 98 °F (36.7 °C)  Heart Rate:  [72-75] 75  Resp:  [16-18] 16  BP: (119-184)/(69-84) 119/70    Objective  Physical Exam  MENTAL STATUS -  AWAKE / ALERT  HEENT- NCAT,   SCLERA NON-ICTERIC, CONJUNCTIVA PINK, OP MOIST, NO JVD, EARS UNREMARKABLE EXTERNALLY  LUNGS - CTA, NO WHEEZES, RALES OR RHONCHI  HEART- RRR, NO RUB, MURMUR, OR GALLOP  ABD - NORMOACTIVE BOWEL SOUNDS, SOFT, NT.    EXT - NO EDEMA OR CYANOSIS  NEURO -alert.  Aphasia.  She will get occasional single word but not in context of the question asked.  Does not follow commands.     Right facial droop.   MOTOR EXAM -patient moves the left side more than the right but takes resistance bilaterally         Results Review:     I reviewed the patient's new clinical results.  Glucose   Date/Time Value Ref Range Status   08/02/2019 1600 125 70 - 130 mg/dL Final   08/02/2019 1105 221 (H) 70 - 130 mg/dL Final   08/02/2019 0715 182 (H) 70 - 130 mg/dL Final    08/01/2019 2037 177 (H) 70 - 130 mg/dL Final   08/01/2019 1600 83 70 - 130 mg/dL Final   08/01/2019 1101 286 (H) 70 - 130 mg/dL Final   08/01/2019 0704 133 (H) 70 - 130 mg/dL Final   07/31/2019 2022 191 (H) 70 - 130 mg/dL Final     Results from last 7 days   Lab Units 08/02/19  0720 08/01/19 2023 08/01/19  1425 08/01/19  0648   WBC 10*3/mm3 9.89  --   --  6.87   HEMOGLOBIN g/dL 9.0* 8.6* 7.8* 7.4*   HEMATOCRIT % 28.7* 27.4* 24.9* 23.6*   PLATELETS 10*3/mm3 428  --   --  442       Ref. Range 5/22/2019 05:44 5/22/2019 11:34 7/9/2019 08:25 7/18/2019 04:49 7/19/2019 05:05 7/23/2019 05:56 8/1/2019 06:48   Hemoglobin Latest Ref Range: 12.0 - 15.9 g/dL 10.8 (L)  10.6 (L) 8.5 (L) 9.7 (L) 9.3 (L) 7.4 (L)   Hematocrit Latest Ref Range: 34.0 - 46.6 % 35.1  33.4 (L) 26.2 (L) 29.9 (L) 28.6 (L) 23.6 (L)     Results from last 7 days   Lab Units 08/02/19  0720 08/01/19  0647   SODIUM mmol/L 146* 139   POTASSIUM mmol/L 3.7 3.7   CHLORIDE mmol/L 105 101   CO2 mmol/L 29.3* 29.1*   BUN mg/dL 30* 23   CREATININE mg/dL 0.92 0.69   CALCIUM mg/dL 8.8 9.0   BILIRUBIN mg/dL  --  0.3   ALK PHOS U/L  --  107   ALT (SGPT) U/L  --  31   AST (SGOT) U/L  --  32   GLUCOSE mg/dL 193* 123*         Ref. Range 8/1/2019 06:47   25 Hydroxy, Vitamin D Latest Ref Range: 30.0 - 100.0 ng/ml 21.8 (L)       Ref. Range 8/1/2019 06:47   Total Cholesterol Latest Ref Range: 0 - 200 mg/dL 105   HDL Cholesterol Latest Ref Range: 40 - 60 mg/dL 40   LDL Cholesterol  Latest Ref Range: 0 - 100 mg/dL 57   VLDL Cholesterol Latest Ref Range: 5 - 40 mg/dL 8   Triglycerides Latest Ref Range: 0 - 150 mg/dL 40   Medication Review: done  Scheduled Meds:    amantadine 100 mg Oral QAM   apixaban 5 mg Oral Q12H   aspirin 325 mg Oral Daily   atorvastatin 80 mg Oral Nightly   brimonidine 1 drop Both Eyes BID   dorzolamide 1 drop Both Eyes BID   glipiZIDE 5 mg Oral BID AC   insulin regular 0-14 Units Subcutaneous TID AC   losartan-HCTZ (HYZAAR) 100-12.5 combo dose  Oral Daily    nebivolol 20 mg Oral Daily   nystatin 5 mL Swish & Spit 4x Daily   pantoprazole 40 mg Oral BID AC   vitamin D 50,000 Units Oral Q7 Days     Continuous Infusions:   PRN Meds:.dextrose  •  dextrose  •  glucagon (human recombinant)  •  nitroglycerin      Assessment/Plan       Stroke (cerebrum) (CMS/HCC)      Assessment & Plan  Status post left CVA with aphasia and spastic right hemiparesis    Neuro stimulation-amantadine added July 27    Dysphagia-Cortrak feeding tube discontinued on July 31 and started on puréed/honey thick liquid diet with strategies    History of multiple bilateral strokes and left central retinal artery occlusion    History of left greater than right internal carotid artery stenosis-status post left internal carotid artery stent July 17, 2019    History of hypertension - Hyzaar/ nebivolol    History of diabetes mellitus -Lantus/glipizide  August 1-blood sugars were low yesterday afternoon after tube feeds discontinued.  Held glipizide last night and this morning and Lantus last night.  Blood sugar elevated at noontime today.  Will receive resume glipizide at a lower dose of 5 mg twice a day with meals.  Continue sliding scale insulin coverage.  She also is on metformin at home.    Stroke prophylaxis-aspirin/Eliquis/atorvastatin    Anemia-August 1-hemoglobin 7.4.  Most recent check on July 23 HGB 9.3.  Will recheck hemoglobin this afternoon.  Hemoccult stool.  Iron studies.  Reticulocyte count.  Recheck CBC in the a.m.  Added Protonix.  Patient is on aspirin and Eliquis.  With recent stroke  will look to transfuse packed red blood cell.  August 2-hemoglobin improved 9.0-1 unit packed red blood cells.  Elevated reticulocyte count in response to anemia.  Nursing describes dark stool.  Patient discussed with gastroenterology.  At this point unable to do endoscopy as on Eliquis and aspirin.  Will treat symptomatically for now with increasing proton pump inhibitor and transfusing as needed.    DVT  prophylaxis-SCDs/anticoagulation    Impulsivity-   Nursing to do re-orientation with the patient.  Room close to the nursing station.    Endocrine-vitamin D deficiency-ergocalciferol 50,000 units weekly x8 weeks added. Vitamin B12 level and TSH checked and late May unremarkable     Impaired cognition/impaired language, impaired swallow, impaired activity daily living, impaired mobility     Now admit for comprehensive acute inpatient rehabilitation .  This would be an interdisciplinary program with physical therapy 1 hour,  occupational therapy 1 hour, and speech therapy 1 hour, 5 days a week.  Rehabilitation nursing for carryover, monitoring of cardiopulmonary and neurologic   status, bowel and bladder, and skin  Ongoing physician follow-up.  Weekly team conferences.  Goals are indeterminant.   Rehabilitation prognosis determined.  Medical prognosis determined.  Estimated length of stay is indeterminate.          Ajit Howard MD  08/02/19  6:36 PM    Time:   >35 minutes with > 50% face-to-face / floor time / coordination of care

## 2019-08-03 LAB
BASOPHILS # BLD AUTO: 0.06 10*3/MM3 (ref 0–0.2)
BASOPHILS NFR BLD AUTO: 0.7 % (ref 0–1.5)
DEPRECATED RDW RBC AUTO: 59.8 FL (ref 37–54)
EOSINOPHIL # BLD AUTO: 0.14 10*3/MM3 (ref 0–0.4)
EOSINOPHIL NFR BLD AUTO: 1.6 % (ref 0.3–6.2)
ERYTHROCYTE [DISTWIDTH] IN BLOOD BY AUTOMATED COUNT: 17.9 % (ref 12.3–15.4)
GLUCOSE BLDC GLUCOMTR-MCNC: 157 MG/DL (ref 70–130)
GLUCOSE BLDC GLUCOMTR-MCNC: 167 MG/DL (ref 70–130)
GLUCOSE BLDC GLUCOMTR-MCNC: 233 MG/DL (ref 70–130)
GLUCOSE BLDC GLUCOMTR-MCNC: 369 MG/DL (ref 70–130)
HCT VFR BLD AUTO: 31.1 % (ref 34–46.6)
HGB BLD-MCNC: 9.7 G/DL (ref 12–15.9)
IMM GRANULOCYTES # BLD AUTO: 0.07 10*3/MM3 (ref 0–0.05)
IMM GRANULOCYTES NFR BLD AUTO: 0.8 % (ref 0–0.5)
LYMPHOCYTES # BLD AUTO: 0.98 10*3/MM3 (ref 0.7–3.1)
LYMPHOCYTES NFR BLD AUTO: 11.4 % (ref 19.6–45.3)
MCH RBC QN AUTO: 29 PG (ref 26.6–33)
MCHC RBC AUTO-ENTMCNC: 31.2 G/DL (ref 31.5–35.7)
MCV RBC AUTO: 92.8 FL (ref 79–97)
MONOCYTES # BLD AUTO: 0.5 10*3/MM3 (ref 0.1–0.9)
MONOCYTES NFR BLD AUTO: 5.8 % (ref 5–12)
NEUTROPHILS # BLD AUTO: 6.85 10*3/MM3 (ref 1.7–7)
NEUTROPHILS NFR BLD AUTO: 79.7 % (ref 42.7–76)
NRBC BLD AUTO-RTO: 0 /100 WBC (ref 0–0.2)
PLATELET # BLD AUTO: 448 10*3/MM3 (ref 140–450)
PMV BLD AUTO: 9.4 FL (ref 6–12)
RBC # BLD AUTO: 3.35 10*6/MM3 (ref 3.77–5.28)
WBC NRBC COR # BLD: 8.6 10*3/MM3 (ref 3.4–10.8)

## 2019-08-03 PROCEDURE — 97535 SELF CARE MNGMENT TRAINING: CPT | Performed by: OCCUPATIONAL THERAPIST

## 2019-08-03 PROCEDURE — 92526 ORAL FUNCTION THERAPY: CPT

## 2019-08-03 PROCEDURE — 82962 GLUCOSE BLOOD TEST: CPT

## 2019-08-03 PROCEDURE — 85025 COMPLETE CBC W/AUTO DIFF WBC: CPT | Performed by: PHYSICAL MEDICINE & REHABILITATION

## 2019-08-03 PROCEDURE — 92507 TX SP LANG VOICE COMM INDIV: CPT

## 2019-08-03 PROCEDURE — 97110 THERAPEUTIC EXERCISES: CPT | Performed by: PHYSICAL THERAPIST

## 2019-08-03 PROCEDURE — 63710000001 INSULIN REGULAR HUMAN PER 5 UNITS: Performed by: PHYSICAL MEDICINE & REHABILITATION

## 2019-08-03 PROCEDURE — 99232 SBSQ HOSP IP/OBS MODERATE 35: CPT | Performed by: INTERNAL MEDICINE

## 2019-08-03 PROCEDURE — 97112 NEUROMUSCULAR REEDUCATION: CPT | Performed by: OCCUPATIONAL THERAPIST

## 2019-08-03 RX ADMIN — BRIMONIDINE TARTRATE 1 DROP: 2 SOLUTION OPHTHALMIC at 20:20

## 2019-08-03 RX ADMIN — APIXABAN 5 MG: 5 TABLET, FILM COATED ORAL at 08:53

## 2019-08-03 RX ADMIN — INSULIN HUMAN 3 UNITS: 100 INJECTION, SOLUTION PARENTERAL at 08:52

## 2019-08-03 RX ADMIN — INSULIN HUMAN 5 UNITS: 100 INJECTION, SOLUTION PARENTERAL at 16:52

## 2019-08-03 RX ADMIN — DORZOLAMIDE HYDROCHLORIDE 1 DROP: 20 SOLUTION/ DROPS OPHTHALMIC at 20:20

## 2019-08-03 RX ADMIN — DORZOLAMIDE HYDROCHLORIDE 1 DROP: 20 SOLUTION/ DROPS OPHTHALMIC at 08:52

## 2019-08-03 RX ADMIN — AMANTADINE HYDROCHLORIDE 100 MG: 100 CAPSULE ORAL at 07:15

## 2019-08-03 RX ADMIN — NYSTATIN 500000 UNITS: 100000 SUSPENSION ORAL at 11:52

## 2019-08-03 RX ADMIN — NYSTATIN 500000 UNITS: 100000 SUSPENSION ORAL at 08:52

## 2019-08-03 RX ADMIN — INSULIN HUMAN 12 UNITS: 100 INJECTION, SOLUTION PARENTERAL at 11:48

## 2019-08-03 RX ADMIN — PANTOPRAZOLE SODIUM 40 MG: 40 TABLET, DELAYED RELEASE ORAL at 07:15

## 2019-08-03 RX ADMIN — PANTOPRAZOLE SODIUM 40 MG: 40 TABLET, DELAYED RELEASE ORAL at 16:53

## 2019-08-03 RX ADMIN — ASPIRIN 325 MG: 325 TABLET ORAL at 08:53

## 2019-08-03 RX ADMIN — NEBIVOLOL HYDROCHLORIDE 20 MG: 10 TABLET ORAL at 08:53

## 2019-08-03 RX ADMIN — NYSTATIN 500000 UNITS: 100000 SUSPENSION ORAL at 20:20

## 2019-08-03 RX ADMIN — GLIPIZIDE 5 MG: 5 TABLET ORAL at 07:15

## 2019-08-03 RX ADMIN — GLIPIZIDE 5 MG: 5 TABLET ORAL at 16:53

## 2019-08-03 RX ADMIN — NYSTATIN 500000 UNITS: 100000 SUSPENSION ORAL at 16:52

## 2019-08-03 RX ADMIN — LOSARTAN POTASSIUM: 50 TABLET, FILM COATED ORAL at 08:52

## 2019-08-03 RX ADMIN — ATORVASTATIN CALCIUM 80 MG: 80 TABLET, FILM COATED ORAL at 20:20

## 2019-08-03 RX ADMIN — APIXABAN 5 MG: 5 TABLET, FILM COATED ORAL at 20:20

## 2019-08-03 RX ADMIN — BRIMONIDINE TARTRATE 1 DROP: 2 SOLUTION OPHTHALMIC at 08:52

## 2019-08-03 NOTE — PROGRESS NOTES
Occupational Therapy: Branch    Physical Therapy: Branch    Speech Language Pathology:  Individual: 60 minutes.    Signed by: Carla Fang SLP

## 2019-08-03 NOTE — PROGRESS NOTES
LOS: 3 days   Patient Care Team:  Eric Matta MD as PCP - General (General Practice)    Chief Complaint: same    Subjective     History of Present Illness    Subjective Pt is awake and alert. Pt is non verbal.  is pleased with pt's progress.     History taken from: patient    Objective     Vital Signs  Temp:  [97.4 °F (36.3 °C)-98.2 °F (36.8 °C)] 97.4 °F (36.3 °C)  Heart Rate:  [71-75] 71  Resp:  [16] 16  BP: (119-169)/(70-72) 169/72    Objectiveexam unchanged calves soft and NT resp unlabored and regular    Results Review:     I reviewed the patient's new clinical results.    Medication Review:     Assessment/Plan       Stroke (cerebrum) (CMS/[facility])      Assessment & Plan Continue eval phase    Viktor Lopez MD  08/03/19  11:02 AM    Time:

## 2019-08-03 NOTE — THERAPY TREATMENT NOTE
Inpatient Rehabilitation - Speech Language Pathology Treatment Note    [facility]       Patient Name: Darby Workman  : 1945  MRN: [id number]    Today's Date: 8/3/2019           Admit Date: 2019      Visit Dx:      No diagnosis found.    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)   • Right-sided headache   • Acute ischemic stroke (CMS/HCC)   • Embolic stroke (CMS/HCC)   • Cerebral infarction due to stenosis of left carotid artery (CMS/HCC)   • Anticoagulated by anticoagulation treatment   • Stroke (cerebrum) (CMS/HCC)          Therapy Treatment    Evaluation/Coping    Evaluation/Treatment Time and Intent  Subjective Information: no complaints (19 1030 : Carla Fang, MS CCC-SLP)  Existing Precautions/Restrictions: fall(swallow) (19 1030 : Carla Fang MS CCC-SLP)  Document Type: therapy note (daily note) (19 1030 : Carla Fang, MS CCC-SLP)  Mode of Treatment: individual therapy, speech-language pathology (19 1030 : Carla Fang, MS CCC-SLP)  Patient/Family Observations: pt seen in dining room - breakfast, and later in tx room- alert (19 1030 : Carla Fang, MS CCC-SLP)  Start Time (Evaluation/Treatment): 0715(1000) (19 1030 : Carla Fang, MS CCC-SLP)  Stop Time (Evaluation/Treatment): 0745(1030) (19 1030 : Carla Fang, MS CCC-SLP)    Vitals/Pain/Safety         Cognition/Communication         Oral Motor/Eating         Mobility/Basic Activities/Instrumental Activities/Motor/Modality                   ROM/MMT                   Sensory/Myotome/Dermatome/Edema               Posture/Balance/Special Tests/Exercise/Transportation/Sexual Function                   Orthotics/Residual Limb/Prosthetic Management              Outcome Summary         EDUCATION    The patient has been educated in the  following areas:     Communication Impairment Dysphagia (Swallowing Impairment).    SLP Recommendation and Plan                                                          SLP GOALS     Row Name 08/03/19 1000 08/02/19 1200 08/01/19 1230       Oral Nutrition/Hydration Goal 1 (SLP)    Oral Nutrition/Hydration Goal 1, SLP  --  Pt will accept PO.  -SH  --    Progress/Outcomes (Oral Nutrition/Hydration Goal 1, SLP)  --  goal met  -SH  --       Oral Nutrition/Hydration Goal 2 (SLP)    Oral Nutrition/Hydration Goal 2, SLP  --  Pt will tolerate honey and puree without overt s/s of aspiration, pocketing, or anterior loss across three meal observations.  -SH  --    Time Frame (Oral Nutrition/Hydration Goal 2, SLP)  --  by discharge  -SH  --    Barriers (Oral Nutrition/Hydration Goal 2, SLP)  pt displayed apraxic behavior- trying to drink from pudding cup, using sppon backwards/upside down- did not follow thru once displayed the correct method for use- very perseverative ; anterior spillage noted with puree and pt required intermittent tactile cues to slow rate/swallow one bite before placing another one in mouth; mild oral residue noted; no overt clinical s/s aspiration noted during breakfast on puree , honey thick liquids  -SL  Progress: Minimal intake this date during meal obs. No overt s/s of aspiration with honey via cup, but prior to PO, patient was noted to throat clear with wet voice. Slow AP transit with puree. Anterior loss noted with puree and honey this date. No oral residue post swallow.   -SH  --    Progress/Outcomes (Oral Nutrition/Hydration Goal 2, SLP)  goal ongoing  -SL  good progress toward goal  -SH  --       Words/Phrases/Sentences Goal 1 (SLP)    Improve Ability to Comprehend Words/Phrases/Sentences Through: Goal 1 (SLP)  identify pictures, field of  -SL  --  identify body part;identify familiar objects;identified by name;90%;independently (over 90% accuracy)  -KB    Time Frame (Identify Objects and Pictures  "Goal 1, SLP)  --  --  by discharge  -KB    Progress (Ability to Contruct Words/Phrases/Sentences Goal 1, SLP)  50%;with maximum cues (25-49%) ID - common pictures- RF of 2  -SL  --  --    Progress/Outcomes (Identify Objects and Pictures Goal 1, SLP)  goal ongoing  -SL  --  --    Comment (Words/Phrases/Sentences Goal 1, SLP)  max cues required for pointing response  -SL  --  --       Comprehend Questions Goal 1 (SLP)    Improve Ability to Comprehend Questions Goal 1 (SLP)  --  --  questions about personal information;simple yes/no questions;simple wh questions;simple general questions;90%;independently (over 90% accuracy)  -KB    Time Frame (Comprehend Questions Goal 1, SLP)  --  --  by discharge  -KB    Progress (Ability to Comprehend Questions Goal 1, SLP)  60%;with minimal cues (75-90%) simple personal yes/no questions  -SL  --  --    Progress/Outcomes (Comprehend Questions Goal 1, SLP)  goal ongoing  -SL  --  --    Comment (Comprehend Questions Goal 1, SLP)  pt did appear to display a \"yes\" preference  -SL  --  --       Follow Directions Goal 2 (SLP)    Improve Ability to Follow Directions Goal 1 (SLP)  1 step direction with objects;1 step direction without objects;80%;with minimal cues (75-90%)  -SL  --  --    Time Frame (Follow Directions Goal 1, SLP)  by discharge  -SL  --  --    Progress (Ability to Follow Directions Goal 1, SLP)  10%;independently (over 90% accuracy);60%;with maximum cues (25-49%) simple 1 step body commands  -SL  --  --    Progress/Outcomes (Follow Directions Goal 1, SLP)  goal ongoing  -SL  --  --    Comment (Follow Directions Goal 1, SLP)  required model and tactile cues for most directives  -SL  --  --       Word Retrieval Skills Goal 1 (SLP)    Improve Word Retrieval Skills By Goal 1 (SLP)  --  completing automatic speech task, counting;completing automatic speech task, alphabet;completing automatic speech task, days of the week;completing automatic speech task, months;completing " "automatic speech task, sing “Happy Birthday”  -  completing automatic speech task, counting;completing automatic speech task, alphabet;completing automatic speech task, days of the week;completing automatic speech task, months;completing automatic speech task, sing “Happy Birthday”  -KB    Time Frame (Word Retrieval Goal 1, SLP)  --  by discharge  -  by discharge  -    Progress/Outcomes (Word Retrieval Goal 1, SLP)  --  goal ongoing  -  --    Comment (Word Retrieval Goal 1, SLP)  --  Automatics nursey songs-70% with phonemic paraphasias. Counting 1-10 with MAX verbal and visual cues-20%. Saying the alphabet-30% acc with MAX verbal and visual cues Imitating vowels-75%, words-20%  -  --       Word Retrieval Skills Goal 2 (SLP)    Improve Word Retrieval Skills By Goal 2 (SLP)  --  --  confrontational naming task;high frequency;completing open ended structured sentence;90%;independently (over 90% accuracy)  -KB    Time Frame (Word Retrieval Goal 2, SLP)  --  --  by discharge  -       Graphic Expression of Shapes, Letters, Numbers Goal 1 (SLP)    Improve Graphic Expression of Shapes, Letters, and Numbers Goal 1 (SLP)  --  --  copy shapes, numbers, and letters;writing dictated number and letters;90%;independently (over 90% accuracy)  -       Planning and Execution of Connected Speech Goal 1 (SLP)    Improve Planning and Execution of Connected Speech by Goal 1 (SLP)  --  --  imitating consonant-vowel combos;imitating one syllable words;imitating two syllable words;completing open-ended sentences;repeating phrases;90%;independently (over 90% accuracy)  -KB    Barriers (Planning and Execution of Connected Speech Goal 1, SLP)  attempted auto speech- counting w/visual cues- 30% - pt noted to perseverate on numbers \"4 \" and \"5';  unable to produce any day of week adequately, however utterances were very difficult to distinguish due to low vocal intensity;  a few words were elicited when singing songs- but only " approx 10% w/max cues  -SL  --  --    Progress (Planning and Execution of Connected Speech Goal 1, SLP)  with maximum cues (25-49%)  -SL  --  --    Progress/Outcomes (Planning and Execution of Connected Speech Goal 1, SLP)  goal ongoing  -SL  --  --       Augmentative/Alternative Communication Objectives Goal 1 (SLP    Communication (Augmentative/Alternative Communication Goal 1, SLP)  --  --  improve ability to use non-verbal communication strategies;improve ability to use low tech augmentative/alternative communication device  -KB    Improve Communication by (Augmentative/Alternative Communication Goal 1, SLP)  --  --  use gestures to communicate;use gesture with object to communicate;alphabet/picture board;90%;independently (over 90% accuracy)  -KB    Time Frame (Augmentative/Alternative Communication Goal 1, SLP)  --  --  by discharge  -KB       Additional Goal 1 (SLP)    Additional Goal 1, SLP  --  --  Complete diagnostic treatment tasks for cognition.   -KB      User Key  (r) = Recorded By, (t) = Taken By, (c) = Cosigned By    Initials Name Provider Type    Carla Kearns, MS CCC-SLP Speech and Language Pathologist    Mary Boone MS CCC-SLP Speech and Language Pathologist    KB Bruton, Katherine L Speech and Language Pathologist             SLP Outcome Measures (last 72 hours)      SLP Outcome Measures     Row Name 08/01/19 1600 07/31/19 1200          SLP Outcome Measures    Outcome Measure Used?  Adult NOMS  -KB  Adult NOMS  -AW        Adult FCM Scores    FCM Chosen  Auditory Comprehension;Verbal Expression  -KB  Swallowing  -AW     Swallowing FCM Score  --  3  -AW     Auditory Comp Score FCM - (Spoken Language)  2  -KB  --     Verbal Expression FCM Score  2  -KB  --       User Key  (r) = Recorded By, (t) = Taken By, (c) = Cosigned By    Initials Name Effective Dates    Ingris White, MS CCC-SLP 06/08/18 -     KB Bruton, Katherine L 03/07/18 -               Time Calculation:       Time Calculation- SLP      Row Name 08/03/19 1057 08/03/19 0745          Time Calculation- SLP    SLP Start Time  1000  -  0715  -     SLP Stop Time  1030  -  0745  -     SLP Time Calculation (min)  30 min  -SL  30 min  -       User Key  (r) = Recorded By, (t) = Taken By, (c) = Cosigned By    Initials Name Provider Type    Carla Kearns MS CCC-SLP Speech and Language Pathologist            Therapy Charges for Today     Code Description Service Date Service Provider Modifiers Qty    78192482363 HC ST TREATMENT SPEECH 2 8/3/2019 Carla Fang MS CCC-SLP GN 1    50695148083 HC ST TREATMENT SWALLOW 2 8/3/2019 Carla Fang MS CCC-SLP GN 1               ADULT NOMS (last 72 hours)      Adult NOMS     Row Name 08/01/19 1600 07/31/19 1200                Adult FCM Scores    FCM Chosen  Auditory Comprehension;Verbal Expression  -KB  Swallowing  -AW       Swallowing FCM Score  --  3  -AW       Auditory Comp Score FCM - (Spoken Language)  2  -KB  --       Verbal Expression FCM Score  2  -KB  --         User Key  (r) = Recorded By, (t) = Taken By, (c) = Cosigned By    Initials Name Effective Dates    Ingris White, MS CCC-SLP 06/08/18 -     KB Bruton, Katherine L 03/07/18 -                      Carla Fang MS CCC-SLP  8/3/2019

## 2019-08-03 NOTE — PROGRESS NOTES
Inpatient Rehabilitation Plan of Care Note    Plan of Care  Care Plan Reviewed - No updates at this time.    Psychosocial    Performed Intervention(s)  Assist patient to express needs and concerns      Safety    Performed Intervention(s)  Bed alarm, wc alarm  Safety rounds  Items within reach      Body Systems    Performed Intervention(s)  Daily skin inspection      Sphincter Control    Performed Intervention(s)  Monitor intake and output  Encourage fluid intake  Elimination schedule    Signed by: Jenna Corona RN

## 2019-08-03 NOTE — PLAN OF CARE
Problem: Skin Injury Risk (Adult)  Goal: Skin Health and Integrity  Outcome: Ongoing (interventions implemented as appropriate)   08/03/19 0252   Skin Injury Risk (Adult)   Skin Health and Integrity making progress toward outcome       Problem: Fall Risk (Adult)  Goal: Absence of Fall  Outcome: Ongoing (interventions implemented as appropriate)   08/03/19 0252   Fall Risk (Adult)   Absence of Fall making progress toward outcome       Problem: Patient Care Overview  Goal: Plan of Care Review  Outcome: Ongoing (interventions implemented as appropriate)   08/03/19 0252   Patient Care Overview   IRF Plan of Care Review progress ongoing, continue   Progress, Functional Goals demonstrating adequate progress   Coping/Psychosocial   Plan of Care Reviewed With patient   OTHER   Outcome Summary Patient calm and cooperative. Global aphasia but can follow some commands. Order to stop NIH. Impulsive and will get out of bed without calling for assistance. Meds crushed in AS. If med unable to be crushed give with AS and follow with HTL. No c/o pain.        Problem: Stroke (IRF) (Adult)  Goal: Promote Optimal Functional [place]  Outcome: Ongoing (interventions implemented as appropriate)   08/03/19 0252   Stroke (IRF) (Adult)   Promote Optimal Functional [place] demonstrating adequate progress

## 2019-08-03 NOTE — PROGRESS NOTES
Inpatient Rehabilitation Functional Measures Assessment    Functional Measures  KATI Eating:  [facility] Grooming: [facility] Bathing:  [facility] Upper Body Dressing:  [facility] Lower Body Dressing:  [facility] Toileting:  [facility] Bladder Management  Level of Assistance:  [place]  Frequency/Number of Accidents this Shift:  [facility] Bowel Management  Level of Assistance: [place]  Frequency/Number of Accidents this Shift: [facility] Bed/Chair/Wheelchair Transfer:  [facility] Toilet Transfer:  [facility] Tub/Shower Transfer:  [place]    Previously Documented Mode of Locomotion at Discharge: Field  KATI Expected Mode of Locomotion at Discharge: [facility] Walk/Wheelchair:  [facility] Stairs:  [facility] Comprehension:  Auditory comprehension is the usual mode. Patient does not  comprehend complex/abstract information in their primary language without  assistance from a helper. Comprehension Score = 2, Maximal Prompting. Patient  comprehends basic daily needs 25-49% of the time. Patient understands simple  information via single words or gestures. Requires maximal/a lot of prompting  (most of the time). Patient requires the following assistive device(s): Glasses.    KATI Expression:  Vocal expression is the usual mode. Patient does not express  complex/abstract information in their primary language without a helper.  Expression Score = 2, Maximal Prompting. Patient expresses basic daily needs  25-49% of the time. Patient uses only single words or gestures and requires  maximal/a lot of prompting (most of the time). No assistive devices were  required.  KATI Social Interaction:  Activity was not observed.  KATI Problem Solving:  Activity was not observed.  KATI Memory:  Activity was not observed.    Therapy Mode Minutes  Occupational Therapy: [place]  Physical Therapy: [place]  Speech Language Pathology:  [place]    Signed by: Leslie Mcdonald RN

## 2019-08-03 NOTE — PROGRESS NOTES
Inpatient Rehabilitation Functional Measures Assessment    Functional Measures  KATI Eating:  [facility] Grooming: [place]  [facility] Bathing:  [place]  [facility] Upper Body Dressing:  [facility] Lower Body Dressing:  [facility] Toileting:  [facility] Bladder Management  Level of Assistance:  [place]  Frequency/Number of Accidents this Shift:  [facility] Bowel Management  Level of Assistance: [place]  Frequency/Number of Accidents this Shift: [place]    [facility] Bed/Chair/Wheelchair Transfer:  Bed/chair/wheelchair Transfer Score = 4.  Patient performs 75% or more of effort and minimal assistance (little/incidental  help/lifting of one limb/steadying) for transferring to and from the  bed/chair/wheelchair, requiring: Steadying. Patient requires the following  assistive device(s): Bed rails. Arm rest.  [facility]ATI Toilet Transfer:  [facility] Tub/Shower Transfer:  [place]    Previously Documented Mode of Locomotion at Discharge: Field  KATI Expected Mode of Locomotion at Discharge: [facility] Walk/Wheelchair:  WHEELCHAIR OBSERVATION   Wheelchair did not occur.    WALK OBSERVATION   Walk Distance Scale = 2.  Distance walked is 50 -149 feet. Walk Score = 2.  Patient performs 75% or more of effort and requires minimal assistance.  Incidental assistance, contact guard or steadying was provided. Patient walked a  distance of 125 feet. Patient requires the following assistive device(s): hand  held assist .  [facility]ATI Stairs:  Activity was not observed.    KATI Comprehension:  [facility] Expression:  [facility] Social Interaction:  [facility] Problem Solving:  [facility] Memory:  [place]    Therapy Mode Minutes  Occupational Therapy: [place]  Physical Therapy: Individual: 60 minutes.  Speech Language Pathology:  [place]    Signed by: Nicole Austin, PT

## 2019-08-03 NOTE — PROGRESS NOTES
Inpatient Rehabilitation Functional Measures Assessment    Functional Measures  KATI Eating:  [facility] Grooming: [facility] Bathing:  [facility] Upper Body Dressing:  [facility] Lower Body Dressing:  [facility] Toileting:  [facility] Bladder Management  Level of Assistance:  [place]  Frequency/Number of Accidents this Shift:  [facility] Bowel Management  Level of Assistance: [place]  Frequency/Number of Accidents this Shift: [facility] Bed/Chair/Wheelchair Transfer:  [facility] Toilet Transfer:  [facility] Tub/Shower Transfer:  [place]    Previously Documented Mode of Locomotion at Discharge: Field  KATI Expected Mode of Locomotion at Discharge: [facility] Walk/Wheelchair:  [facility] Stairs:  [facility] Comprehension:  Auditory comprehension is the usual mode. Patient does not  comprehend complex/abstract information in their primary language without  assistance from a helper. Comprehension Score = 2, Maximal Prompting. Patient  comprehends basic daily needs 25-49% of the time. Patient understands simple  information via single words or gestures. Requires maximal/a lot of prompting  (most of the time). No assistive devices were required.  KATI Expression:  Vocal expression is the usual mode. Patient does not express  complex/abstract information in their primary language without a helper.  Expression Score = 2, Maximal Prompting. Patient expresses basic daily needs  25-49% of the time. Patient uses only single words or gestures and requires  maximal/a lot of prompting (most of the time). No assistive devices were  required.  KATI Social Interaction:  Social Interaction Score = 5, Supervision.  Patient may  require encouragement to initiate participation. Patient requires directing only  under stressful or unfamiliar conditions, but less than 10% of the time, for the  following behavior(s):  [facility]AT[facility] Problem Solving:  Patient does not make appropriate decisions in order to  solve complex problems without  assistance from a helper. Problem Solving Score =  1, Total Assistance. Patient makes appropriate decisions in order to solve  routine problems less than 25% of the time. Patient requires total direction for  the following behavior(s):  KATI Memory:  Memory Score = 1, Total Assistance. Patient recognizes and  remembers less than 25% of the time. Patient requires total assistance  (prompting all or almost all of the time) for memory for the following:    Therapy Mode Minutes  Occupational Therapy: [place]  Physical Therapy: [place]  Speech Language Pathology:  [place]    Signed by: Jenna Corona RN

## 2019-08-03 NOTE — THERAPY TREATMENT NOTE
Inpatient Rehabilitation - Occupational Therapy Treatment Note    [facility]     Patient Name: Darby Workman  : 1945  MRN: [id number]    Today's Date: 8/3/2019                 Admit Date: 2019      Visit Dx:  No diagnosis found.    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)   • Right-sided headache   • Acute ischemic stroke (CMS/HCC)   • Embolic stroke (CMS/HCC)   • Cerebral infarction due to stenosis of left carotid artery (CMS/HCC)   • Anticoagulated by anticoagulation treatment   • Stroke (cerebrum) (CMS/HCC)         Therapy Treatment    IRF Treatment Summary     Row Name 19 1105 19 1030 19 0852       Evaluation/Treatment Time and Intent    Subjective Information  no complaints  -  no complaints  -  no complaints  -JK    Existing Precautions/Restrictions  fall;other (see comments) Swallow prec  -  fall swallow  -  fall  -JK    Document Type  therapy note (daily note)  -  therapy note (daily note)  -  therapy note (daily note)  -JK    Mode of Treatment  individual therapy;occupational therapy  -  individual therapy;speech-language pathology  -  physical therapy  -JK    Patient/Family Observations  pt up in wc at ns station  -  pt seen in dining room - breakfast, and later in tx room- alert  -  pt seated in WC in dining room  -JK    Start Time (Evaluation/Treatment)  --  0715 1000  -SL  --    Stop Time (Evaluation/Treatment)  --  0745 1030  -SL  --    Recorded by [] Idalmis Dao, OTR [SL] Carla Fang MS CCC-SLP [JK] Nicole Austin, PT    Row Name 19 1105 19 0852          Cognition/Psychosocial- PT/OT    Affect/Mental Status (Cognitive)  unable/difficult to assess  -  unable/difficult to assess  -JK     Orientation Status (Cognition)  unable/difficult to  assess  -KP  unable/difficult to assess  -JK     Follows Commands (Cognition)  follows one step commands;50-74% accuracy;physical/tactile prompts required;repetition of directions required;verbal cues/prompting required;visual queue  -KP  follows one step commands;50-74% accuracy  -JK     Personal Safety Interventions  fall prevention program maintained;gait belt;nonskid shoes/slippers when out of bed  -KP  fall prevention program maintained;gait belt  -JK     Recorded by [] Idalmis Dao OTR [JK] Nicole Austin, PT     Row Name 08/03/19 1105             Bed Mobility Assessment/Treatment    Sit-Supine [place] (Bed Mobility)  set up;supervision;verbal cues  -KP      Comment (Bed Mobility)  NT initially, but pt left w. fam while pt in w/c and ed family how pt cannot be left alone, as she may try to get up. pt then got up to bed, son ran over to help her CGA . then OT placed alarm on bed. pt resting.  -KP      Recorded by [] Idalmis Dao OTR      Row Name 08/03/19 1105             Functional Mobility    Functional Mobility- Ind. Level  set up required;verbal cues required;minimum assist (75% patient effort)  -      Functional Mobility- Comment  from wc to tub bench a couple ft.   -      Recorded by [] Idalmis Dao OTR      Row Name 08/03/19 1105             Transfer Assessment/Treatment    Transfer Assessment/Treatment  sit-stand transfer;stand-sit transfer;shower transfer  -      Recorded by [] Idalmis Dao OTR      Row Name 08/03/19 1105 08/03/19 0852          Sit-Stand Transfer    Sit-Stand [place] (Transfers)  set up;verbal cues;minimum assist (75% patient effort);contact guard  -  verbal cues;minimum assist (75% patient effort)  -JK     Assistive Device (Sit-Stand Transfers)  wheelchair  -  other (see comments) no device, HHA  -JK     Recorded by [] Idalmis Dao OTR [JK] Nicole Austin, PT     Row Name 08/03/19 1105 08/03/19 0852           Stand-Sit Transfer    Stand-Sit [place] (Transfers)  set up;verbal cues;minimum assist (75% patient effort)  -  verbal cues;minimum assist (75% patient effort);contact guard  -JK     Assistive Device (Stand-Sit Transfers)  wheelchair  -  other (see comments) HHA  -JK     Recorded by [] Idalmis Dao OTR [JK] Nicole Austin PT     Row Name 08/03/19 1105             Shower Transfer    Type (Shower Transfer)  stand-sit;stand pivot/stand step;sit-stand  -      [place] Level (Shower Transfer)  set up;verbal cues;minimum assist (75% patient effort)  -      Assistive Device (Shower Transfer)  wheelchair;grab bars/tub rail;tub bench  -      Recorded by [] Idalmis Dao OTR      Row Name 08/03/19 0852             Gait/Stairs Assessment/Training    [place] Level (Gait)  verbal cues;minimum assist (75% patient effort);contact guard  -JK      Assistive Device (Gait)  -- no device  -JK      Distance in Feet (Gait)  100' x 3  -JK      Pattern (Gait)  step-through  -JK      Deviations/Abnormal Patterns (Gait)  ataxic;gait speed decreased;stride length decreased;eliza decreased  -JK      Bilateral Gait Deviations  heel strike decreased  -JK      Recorded by [JK] Nicole Austin, PT      Row Name 08/03/19 1105             Basic Activities of Daily Living (BADLs)    Basic Activities of Daily Living  upper body dressing;lower body dressing  -      Recorded by [] Idalmis Dao OTR      Row Name 08/03/19 1105             Upper Body Dressing Assessment/Treatment    Upper Body Dressing Task  don;doff;bra/undergarment;pull over garment;front opening garment;minimum assist (75% or more patient effort);verbal cues;set up assistance  -      Upper Body Dressing Position  supported sitting able to doff bra, A to fasten bra when donning.   -      Comment (Upper Body Dressing)  no A to doff shirt, min to don shirt, jacket set up w VCs don/doff  -      Recorded by  [KP] Idalmis Dao OTR      Row Name 08/03/19 1105             Lower Body Dressing Assessment/Treatment    Lower Body Dressing [place] Level  doff;don;socks;set up;maximum assist (25% patient effort)  -KP      Lower Body Dressing Position  supported sitting  -KP      Comment (Lower Body Dressing)  only had socks on, no pants available.  bringing in clothes later  -KP      Recorded by [KP] Idalmis Dao OTR      Row Name 08/03/19 1105             Grooming Assessment/Treatment    Grooming [place] Level  grooming skills;oral care regimen;wash face, hands;set up;verbal cues;minimum assist (75% patient effort);moderate assist (50% patient effort)  -KP      Grooming Position  supported sitting;sink side  -KP      Comment (Grooming)  pt brushing nose w. toothbrush/toothpaste. OT A pt to clean up then OT placed toothbrush in mouth.  -KP      Recorded by [KP] Idalmis Dao OTR      Row Name 08/03/19 1105 08/03/19 0852          Pain Scale: FACES Pre/Post-Treatment    Pain: FACES Scale, Pretreatment  0-->no hurt  -KP  2-->hurts little bit  -JK     Pain: FACES Scale, Post-Treatment  0-->no hurt  -KP  2-->hurts little bit  -JK     Recorded by [KP] Idalmis Dao, JOHN [JK] Nicole Austin, PT     Row Name 08/03/19 1105             Static Sitting Balance    Level of [place] (Unsupported Sitting, Static Balance)  supervision  -KP      Sitting Position (Unsupported Sitting, Static Balance)  sitting in wheelchair  -KP      Time Able to Maintain Position (Unsupported Sitting, Static Balance)  more than 5 minutes  -KP      Recorded by [KP] Idalmis Dao OTR      Row Name 08/03/19 1105 08/03/19 0852          Static Standing Balance    Level of [place] (Supported Standing, Static Balance)  contact guard assist  -KP  minimal assist, 75% patient effort;contact guard assist  -JK     Time Able to Maintain Position (Supported Standing, Static Balance)  2 to 3 minutes   -KP  2 to 3 minutes  -JK     Assistive Device Utilized (Supported Standing, Static Balance)  other (see comments) grab bar  -  other (see comments) HHA  -JK     Comment (Unsupported Standing, Static Balance)  to wash buttom/davey area in shower  -  pt stood to get dressed  -JK     Recorded by [] Idalmis Dao OTR [JK] Nicole Austin, PT     Row Name 08/03/19 1108             Neuromuscular Re-education    Comment (Neuromuscular Re-education)  pt completed simple foam board shapes w. min VC on first one, no VC w. second one. Sm parquetty design w. min VC, 3D block design completed on table not in 3D with min VC then max VC and TC for 3D design. to incr FM R hand and cognitive skills/problem solving.  -KP      Recorded by [] Idalmis Dao OTR      Row Name 08/03/19 1105 08/03/19 0852          Positioning and Restraints    Pre-Treatment Position  sitting in chair/recliner  -KP  sitting in chair/recliner  -JK     Post Treatment Position  bed  -KP  wheelchair  -JK     In Bed  supine;call light within reach;encouraged to call for assist;exit alarm on;with family/caregiver  -KP  --     In Wheelchair  --  sitting;with nsg;heels elevated  -JK     Recorded by [] Idalmis Dao OTR [JK] Nicole Austin, PT     Row Name 08/03/19 1105             Daily Summary of Progress (OT)    Functional Goal Overall Progress: Occupational Therapy  progressing toward functional goals as expected  -KP      Recorded by [] Idalmis Dao OTR        User Key  (r) = Recorded By, (t) = Taken By, (c) = Cosigned By    Initials Name Effective Dates    Carla Kearns MS CCC-SLP 06/08/18 -     Idalmis Pearl OTR 06/08/18 -     Nicole Ayon PT 04/03/18 -           Wound 07/17/19 1015 Left neck incision (Active)   Dressing Appearance open to air 8/3/2019  8:00 AM   Closure Liquid skin adhesive 8/3/2019  8:00 AM   Base dry;clean 8/3/2019  8:00 AM   Drainage Amount none 8/2/2019 10:10 PM    Dressing Care, Wound open to air 8/2/2019 10:10 PM         OT Recommendation and Plan            Daily Summary of Progress (OT)  Functional Goal Overall Progress: Occupational Therapy: progressing toward functional goals as expected    OT IRF GOALS     Row Name 08/01/19 1159 07/22/19 1424          Transfer Goal 1 (OT-IRF)    Activity/Assistive Device (Transfer Goal 1, OT-IRF)  toilet;shower chair;walk-in shower  -AF  --     [place] Level (Transfer Goal 1, OT-IRF)  verbal cues required;minimum assist (75% or more patient effort);contact guard assist  -AF  --     Time Frame (Transfer Goal 1, OT-IRF)  short term goal (STG)  -AF  --     Progress/Outcomes (Transfer Goal 1, OT-IRF)  goal ongoing  -AF  --        Transfer Goal 2 (OT-IRF)    Activity/Assistive Device (Transfer Goal 2, OT-IRF)  shower chair;walk-in shower;toilet  -AF  --     [place] Level (Transfer Goal 2, OT-IRF)  contact guard assist;verbal cues required  -AF  --     Time Frame (Transfer Goal 2, OT-IRF)  long term goal (LTG)  -AF  --     Progress/Outcomes (Transfer Goal 2, OT-IRF)  goal ongoing  -AF  --        Bathing Goal 1 (OT-IRF)    Activity/Device (Bathing Goal 1, OT-IRF)  bathing skills, all;grab bar/tub rail;hand-held shower spray hose;shower chair  -AF  --     [place] Level (Bathing Goal 1, OT-IRF)  minimum assist (75% or more patient effort);verbal cues required  -AF  --     Time Frame (Bathing Goal 1, OT-IRF)  short term goal (STG)  -AF  --     Progress/Outcomes (Bathing Goal 1, OT-IRF)  goal ongoing  -AF  --        Bathing Goal 2 (OT-IRF)    Activity/Device (Bathing Goal 2, OT-IRF)  bathing skills, all;grab bar/tub rail;hand-held shower spray hose;shower chair  -AF  --     [place] Level (Bathing Goal 2, OT-IRF)  contact guard assist;verbal cues required  -AF  --     Time Frame (Bathing Goal 2, OT-IRF)  long term goal (LTG)  -AF  --     Progress/Outcomes (Bathing Goal 2, OT-IRF)  goal ongoing  -AF  --        UB Dressing Goal  1 (OT-IRF)    Activity/Device (UB Dressing Goal 1, OT-IRF)  upper body dressing  -AF  --     [place] (UB Dress Goal 1, OT-IRF)  set-up required  -AF  --     Time Frame (UB Dressing Goal 1, OT-IRF)  long term goal (LTG)  -AF  --     Progress/Outcomes (UB Dressing Goal 1, OT-IRF)  goal ongoing  -AF  --        LB Dressing Goal 1 (OT-IRF)    Activity/Device (LB Dressing Goal 1, OT-IRF)  lower body dressing  -AF  --     [place] (LB Dressing Goal 1, OT-IRF)  moderate assist (50-74% patient effort);verbal cues required  -AF  --     Time Frame (LB Dressing Goal 1, OT-IRF)  short term goal (STG)  -AF  --     Progress/Outcomes (LB Dressing Goal 1, OT-IRF)  goal ongoing  -AF  --        LB Dressing Goal 2 (OT-IRF)    Activity/Device (LB Dressing Goal 2, OT-IRF)  lower body dressing  -AF  --     [place] (LB Dressing Goal 2, OT-IRF)  contact guard assist;verbal cues required  -AF  --     Time Frame (LB Dressing Goal 2, OT-IRF)  long term goal (LTG)  -AF  --     Progress/Outcomes (LB Dressing Goal 2, OT-IRF)  goal ongoing  -AF  --        Grooming Goal 1 (OT-IRF)    Activity/Device (Grooming Goal 1, OT-IRF)  grooming skills, all  -AF  --     [place] (Grooming Goal 1, OT-IRF)  minimum assist (75% or more patient effort);verbal cues required  -AF  --     Time Frame (Grooming Goal 1, OT-IRF)  short term goal (STG)  -AF  --     Progress/Outcomes (Grooming Goal 1, OT-IRF)  goal ongoing  -AF  --        Grooming Goal 2 (OT-IRF)    Activity/Device (Grooming Goal 2, OT-IRF)  grooming skills, all  -AF  --     [place] (Grooming Goal 2, OT-IRF)  supervision required;verbal cues required  -AF  --     Time Frame (Grooming Goal 2, OT-IRF)  long term goal (LTG)  -AF  --     Progress/Outcomes (Grooming Goal 2, OT-IRF)  goal ongoing  -AF  --        Toileting Goal 1 (OT-IRF)    Activity/Device (Toileting Goal 1, OT-IRF)  toileting skills, all;grab bar/safety frame;raised toilet seat  -AF  --     [place] Level (Toileting  Goal 1, OT-IRF)  moderate assist (50-74% patient effort);verbal cues required  -AF  --     Time Frame (Toileting Goal 1, OT-IRF)  short term goal (STG)  -AF  --     Progress/Outcomes (Toileting Goal 1, OT-IRF)  goal ongoing  -AF  --        Toileting Goal 2 (OT-IRF)    Activity/Device (Toileting Goal 2, OT-IRF)  toileting skills, all;grab bar/safety frame;raised toilet seat  -AF  --     [place] Level (Toileting Goal 2, OT-IRF)  verbal cues required;contact guard assist  -AF  --     Time Frame (Toileting Goal 2, OT-IRF)  long term goal (LTG)  -AF  --     Progress/Outcomes (Toileting Goal 2, OT-IRF)  goal ongoing  -AF  --        Balance Goal 1 (OT)    Activity/Assistive Device (Balance Goal 1, OT)  --  -- Pt. to complete balance task on EOB to increase ADL skills   -VS     [place] Level/Cues Needed (Balance Goal 1, OT)  --  maximum assist (25-49% patient effort)  -VS     Time Frame (Balance Goal 1, OT)  --  short term goal (STG);1 week  -VS     Progress/Outcomes (Balance Goal 1, OT)  --  continuing progress toward goal  -VS        Caregiver Training Goal 1 (OT-IRF)    Caregiver Training Goal 1 (OT-IRF)  Family and patient to demo safe technique with ADLs, transfers, HEP and adaptive equipment as needed   -AF  --     Time Frame (Caregiver Training Goal 1, OT-IRF)  long term goal (LTG)  -AF  --     Progress/Outcomes (Caregiver Training Goal 1, OT-IRF)  goal ongoing  -AF  --       User Key  (r) = Recorded By, (t) = Taken By, (c) = Cosigned By    Initials Name Provider Type    VS Noemi Bedoya OTR Occupational Therapist    AF Ingris Ansari OTR Occupational Therapist              Outcome Measures     Row Name 07/31/19 9812             How much help from another is currently needed...    Putting on and taking off regular lower body clothing?  1  -KP      Bathing (including washing, rinsing, and drying)  1  -KP      Toileting (which includes using toilet bed pan or urinal)  1  -KP      Putting on and taking  off regular upper body clothing  2  -KP      Taking care of personal grooming (such as brushing teeth)  2  -KP      Eating meals  2  -KP      AM-PAC 6 Clicks Score (OT)  9  -KP         Functional Assessment    Outcome Measure Options  AM-PAC 6 Clicks Daily Activity (OT)  -        User Key  (r) = Recorded By, (t) = Taken By, (c) = Cosigned By    Initials Name Provider Type    Idalmis Pearl OTR Occupational Therapist             Time Calculation:     Time Calculation- OT     Row Name 08/03/19 1117             Time Calculation- OT    OT Start Time  0900  -      OT Stop Time  1000  -      OT Time Calculation (min)  60 min  -        User Key  (r) = Recorded By, (t) = Taken By, (c) = Cosigned By    Initials Name Provider Type    Idalmis Pearl OTR Occupational Therapist          Therapy Charges for Today     Code Description Service Date Service Provider Modifiers Qty    32358298465 HC OT SELF CARE/MGMT/TRAIN EA 15 MIN 8/3/2019 Idalmis Dao OTR GO 1    58128449099 HC OT NEUROMUSC RE EDUCATION EA 15 MIN 8/3/2019 Idalmis Dao OTR GO 3                   JOHN Francisco  8/3/2019

## 2019-08-03 NOTE — PLAN OF CARE
Problem: Skin Injury Risk (Adult)  Goal: Skin Health and Integrity  Outcome: Ongoing (interventions implemented as appropriate)      Problem: Fall Risk (Adult)  Goal: Absence of Fall  Outcome: Ongoing (interventions implemented as appropriate)      Problem: Patient Care Overview  Goal: Plan of Care Review  Outcome: Ongoing (interventions implemented as appropriate)   08/03/19 1122   Patient Care Overview   IRF Plan of Care Review progress ongoing, continue   OTHER   Outcome Summary Pt. is alert and still has global aphasia and is eatting in the DR.      Goal: Individualization and Mutuality  Outcome: Ongoing (interventions implemented as appropriate)    Goal: Discharge Needs Assessment  Outcome: Ongoing (interventions implemented as appropriate)    Goal: Home Safety Plan  Outcome: Ongoing (interventions implemented as appropriate)    Goal: Coping Plan  Outcome: Ongoing (interventions implemented as appropriate)    Goal: Community Reintegration Plan  Outcome: Ongoing (interventions implemented as appropriate)      Problem: Stroke (IRF) (Adult)  Goal: Promote Optimal Functional [place]  Outcome: Ongoing (interventions implemented as appropriate)

## 2019-08-03 NOTE — PROGRESS NOTES
Occupational Therapy: Individual: 60 minutes.    Physical Therapy: Branch    Speech Language Pathology:  Branch    Signed by: JOHN Pandya/L

## 2019-08-03 NOTE — PROGRESS NOTES
[facility]     Inpatient Follow Up Note    Patient Identification:  Name: Darby Workman  Age: 74 y.o.  Sex: female  :  1945  MRN: [id number]    Information from:patient and caregiver     CC: Heme + stool    History:   The nurse says she is doing much better.  She is currently eating, nodding and smiling, but remains a phasic.  No evidence of rectal bleeding.  Hemoglobin is stable.    Review of Systems:  Unobtainable.          Problem List:  Patient Active Problem List    Diagnosis   • Stroke (cerebrum) (CMS/HCC) [I63.9]   • Cerebral infarction due to stenosis of left carotid artery (CMS/HCC) [I63.232]   • Anticoagulated by anticoagulation treatment [Z79.01]   • Embolic stroke (CMS/HCC) [I63.9]   • Right-sided headache [R51]   • Acute ischemic stroke (CMS/HCC) [I63.9]   • Acute cerebrovascular accident (CVA) of cerebellum (CMS/HCC) [I63.9]   • Hypertensive crisis [I16.9]   • Diabetes mellitus (CMS/HCC) [E11.9]   • Hyperlipidemia [E78.5]   • Hypertension [I10]   • Elevated troponin [R74.8]     Current Meds:  MAR Reviewed  Scheduled Meds:  amantadine 100 mg Oral QAM   apixaban 5 mg Oral Q12H   aspirin 325 mg Oral Daily   atorvastatin 80 mg Oral Nightly   brimonidine 1 drop Both Eyes BID   dorzolamide 1 drop Both Eyes BID   glipiZIDE 5 mg Oral BID AC   insulin regular 0-14 Units Subcutaneous TID AC   losartan-HCTZ (HYZAAR) 100-12.5 combo dose  Oral Daily   nebivolol 20 mg Oral Daily   nystatin 5 mL Swish & Spit 4x Daily   pantoprazole 40 mg Oral BID AC   vitamin D 50,000 Units Oral Q7 Days     Continuous Infusions:   PRN Meds:.dextrose  •  dextrose  •  glucagon (human recombinant)  •  nitroglycerin  Allergies:  No Known Allergies    Intake/Output:     Intake/Output Summary (Last 24 hours) at 8/3/2019 1205  Last data filed at 8/3/2019 0753  Gross per 24 hour   Intake 600 ml   Output --   Net 600 ml     New allergies/reactions:  None    Physical Exam:  Vitals:   Temp (24hrs),  "Av.9 °F (36.6 °C), Min:97.4 °F (36.3 °C), Max:98.2 °F (36.8 °C)    Temp:  [97.4 °F (36.3 °C)-98.2 °F (36.8 °C)] 97.4 °F (36.3 °C)  Heart Rate:  [71-75] 71  Resp:  [16] 16  BP: (119-169)/(70-72) 169/72  /72 (BP Location: Left arm, Patient Position: Lying)   Pulse 71   Temp 97.4 °F (36.3 °C) (Oral)   Resp 16   Ht 165.1 cm (65\")   Wt 60.9 kg (134 lb 3.2 oz)   SpO2 97%   BMI 22.33 kg/m²     Exam:  NAD  PERRLA. Sclerae and conjunctivae normal  HENT: external inspection normal.  Alert, oriented, normal affect.         DATA:  Radiology and Labs:   Recent Results (from the past 24 hour(s))   POC Glucose Once    Collection Time: 19  4:00 PM   Result Value Ref Range    Glucose 125 70 - 130 mg/dL   POC Glucose Once    Collection Time: 19  8:38 PM   Result Value Ref Range    Glucose 123 70 - 130 mg/dL   POC Glucose Once    Collection Time: 19  7:18 AM   Result Value Ref Range    Glucose 167 (H) 70 - 130 mg/dL   CBC Auto Differential    Collection Time: 19  7:46 AM   Result Value Ref Range    WBC 8.60 3.40 - 10.80 10*3/mm3    RBC 3.35 (L) 3.77 - 5.28 10*6/mm3    Hemoglobin 9.7 (L) 12.0 - 15.9 g/dL    Hematocrit 31.1 (L) 34.0 - 46.6 %    MCV 92.8 79.0 - 97.0 fL    MCH 29.0 26.6 - 33.0 pg    MCHC 31.2 (L) 31.5 - 35.7 g/dL    RDW 17.9 (H) 12.3 - 15.4 %    RDW-SD 59.8 (H) 37.0 - 54.0 fl    MPV 9.4 6.0 - 12.0 fL    Platelets 448 140 - 450 10*3/mm3    Neutrophil % 79.7 (H) 42.7 - 76.0 %    Lymphocyte % 11.4 (L) 19.6 - 45.3 %    Monocyte % 5.8 5.0 - 12.0 %    Eosinophil % 1.6 0.3 - 6.2 %    Basophil % 0.7 0.0 - 1.5 %    Immature Grans % 0.8 (H) 0.0 - 0.5 %    Neutrophils, Absolute 6.85 1.70 - 7.00 10*3/mm3    Lymphocytes, Absolute 0.98 0.70 - 3.10 10*3/mm3    Monocytes, Absolute 0.50 0.10 - 0.90 10*3/mm3    Eosinophils, Absolute 0.14 0.00 - 0.40 10*3/mm3    Basophils, Absolute 0.06 0.00 - 0.20 10*3/mm3    Immature Grans, Absolute 0.07 (H) 0.00 - 0.05 10*3/mm3    nRBC 0.0 0.0 - 0.2 /100 WBC   POC " Glucose Once    Collection Time: 08/03/19 11:03 AM   Result Value Ref Range    Glucose 369 (H) 70 - 130 mg/dL       Assessment:   Problem List:     Stroke (cerebrum) (CMS/HCC)    No evidence of active GI blood loss at this time.    Plan:   Conservative management for now until such time but the patient is able to tolerate endoscopic evaluation.      Christopher Scott MD  [facility]  8/3/2019

## 2019-08-03 NOTE — THERAPY TREATMENT NOTE
Inpatient Rehabilitation - Physical Therapy Treatment Note  [facility]     Patient Name: Darby Workman  : 1945  MRN: [id number]    Today's Date: 8/3/2019                 Admit Date: 2019      Visit Dx:    No diagnosis found.    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)   • Right-sided headache   • Acute ischemic stroke (CMS/HCC)   • Embolic stroke (CMS/HCC)   • Cerebral infarction due to stenosis of left carotid artery (CMS/HCC)   • Anticoagulated by anticoagulation treatment   • Stroke (cerebrum) (CMS/HCC)       Therapy Treatment    IRF Treatment Summary     Row Name 19 1105 19 1030 19 0852       Evaluation/Treatment Time and Intent    Subjective Information  no complaints  -  no complaints  -  no complaints  -JK    Existing Precautions/Restrictions  fall;other (see comments) Swallow prec  -  fall swallow  -  fall  -JK    Document Type  therapy note (daily note)  -  therapy note (daily note)  -  therapy note (daily note)  -JK    Mode of Treatment  individual therapy;occupational therapy  -  individual therapy;speech-language pathology  -  physical therapy  -JK    Patient/Family Observations  pt up in wc at ns station  -  pt seen in dining room - breakfast, and later in tx room- alert  -  pt seated in WC in dining room  -JK    Start Time (Evaluation/Treatment)  --  0715 1000  -SL  --    Stop Time (Evaluation/Treatment)  --  0791 1030  -SL  --    Recorded by [] Idalmis Dao, OTR [SL] Carla Fang MS CCC-SLP [JK] Nicole Austin, PT    Row Name 19 1105 19 0852          Cognition/Psychosocial- PT/OT    Affect/Mental Status (Cognitive)  unable/difficult to assess  -  unable/difficult to assess  -K     Orientation Status (Cognition)  unable/difficult to assess  -   unable/difficult to assess  -JK     Follows Commands (Cognition)  follows one step commands;50-74% accuracy;physical/tactile prompts required;repetition of directions required;verbal cues/prompting required;visual queue  -KP  follows one step commands;50-74% accuracy  -JK     Personal Safety Interventions  fall prevention program maintained;gait belt;nonskid shoes/slippers when out of bed  -KP  fall prevention program maintained;gait belt  -JK     Recorded by [] Idalmis Dao OTR [JK] Nicole Austin, PT     Row Name 08/03/19 1105             Bed Mobility Assessment/Treatment    Sit-Supine [place] (Bed Mobility)  set up;supervision;verbal cues  -      Comment (Bed Mobility)  NT initially, but pt left w. fam while pt in w/c and ed family how pt cannot be left alone, as she may try to get up. pt then got up to bed, son ran over to help her CGA . then OT placed alarm on bed. pt resting.  -KP      Recorded by [] Idalmis Dao OTR      Row Name 08/03/19 1105             Functional Mobility    Functional Mobility- Ind. Level  set up required;verbal cues required;minimum assist (75% patient effort)  -      Functional Mobility- Comment  from wc to tub bench a couple ft.   -KP      Recorded by [] Idalmis Dao OTR      Row Name 08/03/19 1105             Transfer Assessment/Treatment    Transfer Assessment/Treatment  sit-stand transfer;stand-sit transfer;shower transfer  -      Recorded by [] Idalmis Dao OTR      Row Name 08/03/19 1105 08/03/19 0852          Sit-Stand Transfer    Sit-Stand [place] (Transfers)  set up;verbal cues;minimum assist (75% patient effort);contact guard  -  verbal cues;minimum assist (75% patient effort)  -JK     Assistive Device (Sit-Stand Transfers)  wheelchair  -  other (see comments) no device, HHA  -JK     Recorded by [] Idalmis Dao OTR [JK] Nicole Austin, PT     Row Name 08/03/19 1105 08/03/19 0852           Stand-Sit Transfer    Stand-Sit [place] (Transfers)  set up;verbal cues;minimum assist (75% patient effort)  -  verbal cues;minimum assist (75% patient effort);contact guard  -JK     Assistive Device (Stand-Sit Transfers)  wheelchair  -  other (see comments) HHA  -JK     Recorded by [] Idalmis Dao OTR [JK] Nicole Austin PT     Row Name 08/03/19 1105             Shower Transfer    Type (Shower Transfer)  stand-sit;stand pivot/stand step;sit-stand  -      [place] Level (Shower Transfer)  set up;verbal cues;minimum assist (75% patient effort)  -      Assistive Device (Shower Transfer)  wheelchair;grab bars/tub rail;tub bench  -      Recorded by [] Idalmis Dao OTR      Row Name 08/03/19 0852             Gait/Stairs Assessment/Training    [place] Level (Gait)  verbal cues;minimum assist (75% patient effort);contact guard  -JK      Assistive Device (Gait)  -- no device  -JK      Distance in Feet (Gait)  100' x 3  -JK      Pattern (Gait)  step-through  -JK      Deviations/Abnormal Patterns (Gait)  ataxic;gait speed decreased;stride length decreased;eliza decreased  -JK      Bilateral Gait Deviations  heel strike decreased  -JK      Recorded by [JK] Nicole Austin, PT      Row Name 08/03/19 0852             Safety Issues, Functional Mobility    Safety Issues Affecting Function (Mobility)  awareness of need for assistance;ability to follow commands;insight into deficits/self awareness;judgment;problem solving;safety precaution awareness  -JK      Impairments Affecting Function (Mobility)  balance;cognition;strength  -JK      Recorded by [JK] Nicole Austin, PT      Row Name 08/03/19 1105             Basic Activities of Daily Living (BADLs)    Basic Activities of Daily Living  upper body dressing;lower body dressing  -KP      Recorded by [] Idalmis Dao OTR      Row Name 08/03/19 1105             Upper Body Dressing Assessment/Treatment    Upper Body  Dressing Task  don;doff;bra/undergarment;pull over garment;front opening garment;minimum assist (75% or more patient effort);verbal cues;set up assistance  -      Upper Body Dressing Position  supported sitting able to doff bra, A to fasten bra when donning.   -      Comment (Upper Body Dressing)  no A to doff shirt, min to don shirt, jacket set up w VCs don/doff  -KP      Recorded by [] Idalmis Dao OTR      Row Name 08/03/19 1105             Lower Body Dressing Assessment/Treatment    Lower Body Dressing [place] Level  doff;don;socks;set up;maximum assist (25% patient effort)  -      Lower Body Dressing Position  supported sitting  -KP      Comment (Lower Body Dressing)  only had socks on, no pants available.  bringing in clothes later  -KP      Recorded by [] Idalmis Dao OTR      Row Name 08/03/19 1105             Grooming Assessment/Treatment    Grooming [place] Level  grooming skills;oral care regimen;wash face, hands;set up;verbal cues;minimum assist (75% patient effort);moderate assist (50% patient effort)  -      Grooming Position  supported sitting;sink side  -KP      Comment (Grooming)  pt brushing nose w. toothbrush/toothpaste. OT A pt to clean up then OT placed toothbrush in mouth.  -      Recorded by [] Idalmis Dao, SALR      Row Name 08/03/19 1105 08/03/19 0852          Pain Scale: FACES Pre/Post-Treatment    Pain: FACES Scale, Pretreatment  0-->no hurt  -KP  2-->hurts little bit  -JK     Pain: FACES Scale, Post-Treatment  0-->no hurt  -KP  2-->hurts little bit  -JK     Recorded by [] Idalmis Dao, SALR [JK] Nicole Austin, PT     Row Name 08/03/19 1105             Static Sitting Balance    Level of [place] (Unsupported Sitting, Static Balance)  supervision  -KP      Sitting Position (Unsupported Sitting, Static Balance)  sitting in wheelchair  -KP      Time Able to Maintain Position (Unsupported Sitting, Static Balance)   more than 5 minutes  -KP      Recorded by [KP] Idalmis Dao OTR      Row Name 08/03/19 1105 08/03/19 0852          Static Standing Balance    Level of [place] (Supported Standing, Static Balance)  contact guard assist  -KP  minimal assist, 75% patient effort;contact guard assist  -JK     Time Able to Maintain Position (Supported Standing, Static Balance)  2 to 3 minutes  -KP  2 to 3 minutes  -JK     Assistive Device Utilized (Supported Standing, Static Balance)  other (see comments) grab bar  -KP  other (see comments) HHA  -JK     Comment (Unsupported Standing, Static Balance)  to wash buttom/davey area in shower  -KP  pt stood to get dressed  -JK     Recorded by [KP] Idalmis Dao OTR [JK] Nicole Austin PT     Row Name 08/03/19 1108             Neuromuscular Re-education    Comment (Neuromuscular Re-education)  pt completed simple foam board shapes w. min VC on first one, no VC w. second one. Sm parquetty design w. min VC, 3D block design completed on table not in 3D with min VC then max VC and TC for 3D design. to incr FM R hand and cognitive skills/problem solving.  -KP      Recorded by [] Idalmis Dao OTR      Row Name 08/03/19 1105 08/03/19 0852          Positioning and Restraints    Pre-Treatment Position  sitting in chair/recliner  -KP  sitting in chair/recliner  -JK     Post Treatment Position  bed  -KP  wheelchair  -JK     In Bed  supine;call light within reach;encouraged to call for assist;exit alarm on;with family/caregiver  -KP  --     In Wheelchair  --  sitting;with nsg;heels elevated  -JK     Recorded by [KP] Idalmis Dao OTR [JK] Nicole Austin, PT     Row Name 08/03/19 1101             Daily Summary of Progress (OT)    Functional Goal Overall Progress: Occupational Therapy  progressing toward functional goals as expected  -KP      Recorded by [KP] Idalmis aDo OTR        User Key  (r) = Recorded By, (t) = Taken By, (c) = Cosigned By     Initials Name Effective Dates     Carla Fang, MS CCC-SLP 06/08/18 -     KP Idalmis Dao, OTR 06/08/18 -     Nicole Ayon, PT 04/03/18 -         Wound 07/17/19 1015 Left neck incision (Active)   Dressing Appearance open to air 8/3/2019  8:00 AM   Closure Liquid skin adhesive 8/3/2019  8:00 AM   Base dry;clean 8/3/2019  8:00 AM   Drainage Amount none 8/2/2019 10:10 PM   Dressing Care, Wound open to air 8/2/2019 10:10 PM     Physical Therapy Education     Title: PT OT SLP Therapies (Not Started)     Topic: Physical Therapy (In Progress)     Point: Mobility training (In Progress)     Learning Progress Summary           Patient Acceptance, E,D, NR by NESTOR at 8/3/2019  1:35 PM    Acceptance, D, DU,NR by NESTOR at 8/3/2019  8:56 AM                   Point: Precautions (In Progress)     Learning Progress Summary           Patient Acceptance, E, NR by MD at 8/2/2019 10:44 AM    Acceptance, E,D, NR by MD at 8/1/2019 12:16 PM                               User Key     Initials Effective Dates Name Provider Type Discipline    NESTOR 04/03/18 -  Nicole Austin, PT Physical Therapist PT    MD 04/03/18 -  Mira Chadwick, PT Physical Therapist PT                  PT Recommendation and Plan                 Outcome Measures     Row Name 07/31/19 2578             How much help from another is currently needed...    Putting on and taking off regular lower body clothing?  1  -KP      Bathing (including washing, rinsing, and drying)  1  -KP      Toileting (which includes using toilet bed pan or urinal)  1  -KP      Putting on and taking off regular upper body clothing  2  -KP      Taking care of personal grooming (such as brushing teeth)  2  -KP      Eating meals  2  -KP      AM-PAC 6 Clicks Score (OT)  9  -KP         Functional Assessment    Outcome Measure Options  AM-PAC 6 Clicks Daily Activity (OT)  -KP        User Key  (r) = Recorded By, (t) = Taken By, (c) = Cosigned By    Initials Name Provider Type    Idalmis Pearl  JOHN Smith Occupational Therapist             Time Calculation:     PT Charges     Row Name 08/03/19 1335 08/03/19 1334          Time Calculation    Start Time  1245  -JK  0830  -JK     Stop Time  1315  -JK  0900  -JK     Time Calculation (min)  30 min  -JK  30 min  -JK       User Key  (r) = Recorded By, (t) = Taken By, (c) = Cosigned By    Initials Name Provider Type    Nicole Ayon, PT Physical Therapist          Therapy Charges for Today     Code Description Service Date Service Provider Modifiers Qty    28998629994  PT THER PROC EA 15 MIN 8/3/2019 Nicole Austin, PT GP 4    42648758067 HC PT THER SUPP EA 15 MIN 8/3/2019 Nicole Austin, PT GP 3                   Nicole Austin, PT  8/3/2019

## 2019-08-04 LAB
GLUCOSE BLDC GLUCOMTR-MCNC: 105 MG/DL (ref 70–130)
GLUCOSE BLDC GLUCOMTR-MCNC: 144 MG/DL (ref 70–130)
GLUCOSE BLDC GLUCOMTR-MCNC: 155 MG/DL (ref 70–130)
GLUCOSE BLDC GLUCOMTR-MCNC: 216 MG/DL (ref 70–130)

## 2019-08-04 PROCEDURE — 82962 GLUCOSE BLOOD TEST: CPT

## 2019-08-04 PROCEDURE — 63710000001 INSULIN REGULAR HUMAN PER 5 UNITS: Performed by: PHYSICAL MEDICINE & REHABILITATION

## 2019-08-04 RX ORDER — NEBIVOLOL 10 MG/1
20 TABLET ORAL 2 TIMES DAILY
Status: DISCONTINUED | OUTPATIENT
Start: 2019-08-04 | End: 2019-08-29 | Stop reason: HOSPADM

## 2019-08-04 RX ORDER — ALUMINA, MAGNESIA, AND SIMETHICONE 2400; 2400; 240 MG/30ML; MG/30ML; MG/30ML
15 SUSPENSION ORAL EVERY 6 HOURS PRN
Status: DISCONTINUED | OUTPATIENT
Start: 2019-08-04 | End: 2019-08-29 | Stop reason: HOSPADM

## 2019-08-04 RX ADMIN — PANTOPRAZOLE SODIUM 40 MG: 40 TABLET, DELAYED RELEASE ORAL at 06:15

## 2019-08-04 RX ADMIN — AMANTADINE HYDROCHLORIDE 100 MG: 100 CAPSULE ORAL at 06:15

## 2019-08-04 RX ADMIN — ATORVASTATIN CALCIUM 80 MG: 80 TABLET, FILM COATED ORAL at 20:41

## 2019-08-04 RX ADMIN — ASPIRIN 325 MG: 325 TABLET ORAL at 08:53

## 2019-08-04 RX ADMIN — ALUMINUM HYDROXIDE, MAGNESIUM HYDROXIDE, AND DIMETHICONE 15 ML: 400; 400; 40 SUSPENSION ORAL at 17:45

## 2019-08-04 RX ADMIN — NYSTATIN 500000 UNITS: 100000 SUSPENSION ORAL at 20:41

## 2019-08-04 RX ADMIN — NEBIVOLOL HYDROCHLORIDE 20 MG: 10 TABLET ORAL at 15:52

## 2019-08-04 RX ADMIN — DORZOLAMIDE HYDROCHLORIDE 1 DROP: 20 SOLUTION/ DROPS OPHTHALMIC at 11:37

## 2019-08-04 RX ADMIN — INSULIN HUMAN 5 UNITS: 100 INJECTION, SOLUTION PARENTERAL at 11:37

## 2019-08-04 RX ADMIN — INSULIN HUMAN 3 UNITS: 100 INJECTION, SOLUTION PARENTERAL at 07:56

## 2019-08-04 RX ADMIN — NYSTATIN 500000 UNITS: 100000 SUSPENSION ORAL at 18:00

## 2019-08-04 RX ADMIN — BRIMONIDINE TARTRATE 1 DROP: 2 SOLUTION OPHTHALMIC at 15:56

## 2019-08-04 RX ADMIN — NEBIVOLOL HYDROCHLORIDE 20 MG: 10 TABLET ORAL at 08:54

## 2019-08-04 RX ADMIN — NYSTATIN 500000 UNITS: 100000 SUSPENSION ORAL at 11:37

## 2019-08-04 RX ADMIN — APIXABAN 5 MG: 5 TABLET, FILM COATED ORAL at 08:53

## 2019-08-04 RX ADMIN — LOSARTAN POTASSIUM: 50 TABLET, FILM COATED ORAL at 08:53

## 2019-08-04 RX ADMIN — APIXABAN 5 MG: 5 TABLET, FILM COATED ORAL at 20:41

## 2019-08-04 RX ADMIN — BRIMONIDINE TARTRATE 1 DROP: 2 SOLUTION OPHTHALMIC at 20:41

## 2019-08-04 RX ADMIN — GLIPIZIDE 5 MG: 5 TABLET ORAL at 06:15

## 2019-08-04 RX ADMIN — PANTOPRAZOLE SODIUM 40 MG: 40 TABLET, DELAYED RELEASE ORAL at 16:57

## 2019-08-04 RX ADMIN — NYSTATIN 500000 UNITS: 100000 SUSPENSION ORAL at 08:53

## 2019-08-04 RX ADMIN — DORZOLAMIDE HYDROCHLORIDE 1 DROP: 20 SOLUTION/ DROPS OPHTHALMIC at 20:41

## 2019-08-04 NOTE — PROGRESS NOTES
Inpatient Rehabilitation Functional Measures Assessment    Functional Measures  KATI Eating:  [facility] Grooming: [facility] Bathing:  [facility] Upper Body Dressing:  [facility] Lower Body Dressing:  [facility] Toileting:  [facility] Bladder Management  Level of Assistance:  [place]  Frequency/Number of Accidents this Shift:  [facility] Bowel Management  Level of Assistance: [place]  Frequency/Number of Accidents this Shift: [facility] Bed/Chair/Wheelchair Transfer:  [facility] Toilet Transfer:  [facility] Tub/Shower Transfer:  [place]    Previously Documented Mode of Locomotion at Discharge: Field  KATI Expected Mode of Locomotion at Discharge: [facility] Walk/Wheelchair:  [facility] Stairs:  [facility] Comprehension:  Auditory comprehension is the usual mode. Patient does not  comprehend complex/abstract information in their primary language without  assistance from a helper. Comprehension Score = 2, Maximal Prompting. Patient  comprehends basic daily needs 25-49% of the time. Patient understands simple  information via single words or gestures. Requires maximal/a lot of prompting  (most of the time). Patient requires the following assistive device(s): Glasses.    KATI Expression:  Vocal expression is the usual mode. Patient does not express  complex/abstract information in their primary language without a helper.  Expression Score = 2, Maximal Prompting. Patient expresses basic daily needs  25-49% of the time. Patient uses only single words or gestures and requires  maximal/a lot of prompting (most of the time). No assistive devices were  required.  KATI Social Interaction:  Activity was not observed.  KATI Problem Solving:  Activity was not observed.  KATI Memory:  Activity was not observed.    Therapy Mode Minutes  Occupational Therapy: [place]  Physical Therapy: [place]  Speech Language Pathology:  [place]    Signed by: Leslie Mcdonald RN

## 2019-08-04 NOTE — PLAN OF CARE
Problem: Skin Injury Risk (Adult)  Goal: Skin Health and Integrity  Outcome: Ongoing (interventions implemented as appropriate)   08/04/19 0132   Skin Injury Risk (Adult)   Skin Health and Integrity making progress toward outcome       Problem: Fall Risk (Adult)  Goal: Absence of Fall  Outcome: Ongoing (interventions implemented as appropriate)   08/04/19 0132   Fall Risk (Adult)   Absence of Fall making progress toward outcome       Problem: Patient Care Overview  Goal: Plan of Care Review  Outcome: Ongoing (interventions implemented as appropriate)   08/04/19 0132   Patient Care Overview   IRF Plan of Care Review progress ongoing, continue   Progress, Functional Goals demonstrating adequate progress   Coping/Psychosocial   Plan of Care Reviewed With patient   OTHER   Outcome Summary Patient still presents with global aphasia. On pureed diet with HTL. Pills given crushed in AS or puree. Nystatin swab mouth. No c/o pain. Patient will follow commands sometimes. Patient can be impulsive at times.        Problem: Stroke (IRF) (Adult)  Goal: Promote Optimal Functional [place]  Outcome: Ongoing (interventions implemented as appropriate)   08/04/19 0132   Stroke (IRF) (Adult)   Promote Optimal Functional [place] demonstrating adequate progress

## 2019-08-04 NOTE — PROGRESS NOTES
Inpatient Rehabilitation Functional Measures Assessment    Functional Measures  KATI Eating:  [facility] Grooming: [facility] Bathing:  [facility] Upper Body Dressing:  [facility] Lower Body Dressing:  [facility] Toileting:  [facility] Bladder Management  Level of Assistance:  [place]  Frequency/Number of Accidents this Shift:  [facility] Bowel Management  Level of Assistance: [place]  Frequency/Number of Accidents this Shift: [facility] Bed/Chair/Wheelchair Transfer:  [facility] Toilet Transfer:  [facility] Tub/Shower Transfer:  [place]    Previously Documented Mode of Locomotion at Discharge: [place]  KATI Expected Mode of Locomotion at Discharge: [facility] Walk/Wheelchair:  [facility] Stairs:  [facility] Comprehension:  Auditory comprehension is the usual mode. Patient does not  comprehend complex/abstract information in their primary language without  assistance from a helper. Comprehension Score = 2, Maximal Prompting. Patient  comprehends basic daily needs 25-49% of the time. Patient understands simple  information via single words or gestures. Requires maximal/a lot of prompting  (most of the time). No assistive devices were required.  KATI Expression:  Vocal expression is the usual mode. Patient does not express  complex/abstract information in their primary language without a helper.  Expression Score = 2, Maximal Prompting. Patient expresses basic daily needs  25-49% of the time. Patient uses only single words or gestures and requires  maximal/a lot of prompting (most of the time). No assistive devices were  required.  KATI Social Interaction:  Social Interaction Score = 4, Minimal Direction.  Patient interacts appropriately 75-90% of the time. Patient requires  minimal/occasional direction for the following behavior(s):  KATI Problem Solving:  Patient does not make appropriate decisions in order to  solve complex problems without assistance from a helper. Problem Solving Score =  1, Total  Assistance. Patient makes appropriate decisions in order to solve  routine problems less than 25% of the time. Patient requires total direction for  the following behavior(s):  KATI Memory:  Memory Score = 1, Total Assistance. Patient recognizes and  remembers less than 25% of the time. Patient requires total assistance  (prompting all or almost all of the time) for memory for the following:    Therapy Mode Minutes  Occupational Therapy: [place]  Physical Therapy: [place]  Speech Language Pathology:  [place]    Signed by: Jenna Corona RN

## 2019-08-04 NOTE — PROGRESS NOTES
LOS: 4 days   Patient Care Team:  Eric Matta MD as PCP - General (General Practice)    Chief Complaint: same    Subjective     History of Present Illness    Subjective Pt is awake and alert but nonverbal. Family members present are pleased with progress.    History taken from: patient    Objective     Vital Signs  Temp:  [97.6 °F (36.4 °C)-98.1 °F (36.7 °C)] 98.1 °F (36.7 °C)  Heart Rate:  [67-75] 72  Resp:  [16-18] 18  BP: (126-165)/(58-92) 165/92    Objective exam unchanged calves soft and NT resp unlabored and regular    Results Review:     I reviewed the patient's new clinical results.    Medication Review:     Assessment/Plan       Stroke (cerebrum) (CMS/[facility])      Assessment & Plan Continue to prepare for dc.     Viktor Lopez MD  08/04/19  10:40 AM    Time:

## 2019-08-04 NOTE — PROGRESS NOTES
Chart follow-up.    Recent progress notes and labs noted.     Please let us know if further GI input is desired.     Christopher Scott MD  8/4/2019  12:13 PM

## 2019-08-04 NOTE — PLAN OF CARE
Problem: Skin Injury Risk (Adult)  Goal: Skin Health and Integrity  Outcome: Ongoing (interventions implemented as appropriate)      Problem: Fall Risk (Adult)  Goal: Absence of Fall  Outcome: Ongoing (interventions implemented as appropriate)      Problem: Patient Care Overview  Goal: Plan of Care Review  Outcome: Ongoing (interventions implemented as appropriate)   08/04/19 0132 08/04/19 1018   Patient Care Overview   IRF Plan of Care Review progress ongoing, continue --    OTHER   Outcome Summary --  Pt is on a modified diet and eats in the DR. Pt continue with global aphasia.      Goal: Individualization and Mutuality  Outcome: Ongoing (interventions implemented as appropriate)    Goal: Discharge Needs Assessment  Outcome: Ongoing (interventions implemented as appropriate)    Goal: Home Safety Plan  Outcome: Ongoing (interventions implemented as appropriate)    Goal: Coping Plan  Outcome: Ongoing (interventions implemented as appropriate)    Goal: Community Reintegration Plan  Outcome: Ongoing (interventions implemented as appropriate)      Problem: Stroke (IRF) (Adult)  Goal: Promote Optimal Functional [place]  Outcome: Ongoing (interventions implemented as appropriate)

## 2019-08-05 LAB
ANION GAP SERPL CALCULATED.3IONS-SCNC: 9.7 MMOL/L (ref 5–15)
BASOPHILS # BLD AUTO: 0.07 10*3/MM3 (ref 0–0.2)
BASOPHILS NFR BLD AUTO: 1.3 % (ref 0–1.5)
BUN BLD-MCNC: 18 MG/DL (ref 8–23)
BUN/CREAT SERPL: 20.7 (ref 7–25)
CALCIUM SPEC-SCNC: 9.1 MG/DL (ref 8.6–10.5)
CHLORIDE SERPL-SCNC: 99 MMOL/L (ref 98–107)
CO2 SERPL-SCNC: 27.3 MMOL/L (ref 22–29)
CREAT BLD-MCNC: 0.87 MG/DL (ref 0.57–1)
DEPRECATED RDW RBC AUTO: 59 FL (ref 37–54)
EOSINOPHIL # BLD AUTO: 0.2 10*3/MM3 (ref 0–0.4)
EOSINOPHIL NFR BLD AUTO: 3.7 % (ref 0.3–6.2)
ERYTHROCYTE [DISTWIDTH] IN BLOOD BY AUTOMATED COUNT: 17.4 % (ref 12.3–15.4)
GFR SERPL CREATININE-BSD FRML MDRD: 63 ML/MIN/1.73
GLUCOSE BLD-MCNC: 276 MG/DL (ref 65–99)
GLUCOSE BLDC GLUCOMTR-MCNC: 129 MG/DL (ref 70–130)
GLUCOSE BLDC GLUCOMTR-MCNC: 132 MG/DL (ref 70–130)
GLUCOSE BLDC GLUCOMTR-MCNC: 269 MG/DL (ref 70–130)
HCT VFR BLD AUTO: 30.8 % (ref 34–46.6)
HGB BLD-MCNC: 9.8 G/DL (ref 12–15.9)
IMM GRANULOCYTES # BLD AUTO: 0.02 10*3/MM3 (ref 0–0.05)
IMM GRANULOCYTES NFR BLD AUTO: 0.4 % (ref 0–0.5)
LYMPHOCYTES # BLD AUTO: 0.71 10*3/MM3 (ref 0.7–3.1)
LYMPHOCYTES NFR BLD AUTO: 13.1 % (ref 19.6–45.3)
MCH RBC QN AUTO: 29.8 PG (ref 26.6–33)
MCHC RBC AUTO-ENTMCNC: 31.8 G/DL (ref 31.5–35.7)
MCV RBC AUTO: 93.6 FL (ref 79–97)
MONOCYTES # BLD AUTO: 0.44 10*3/MM3 (ref 0.1–0.9)
MONOCYTES NFR BLD AUTO: 8.1 % (ref 5–12)
NEUTROPHILS # BLD AUTO: 3.96 10*3/MM3 (ref 1.7–7)
NEUTROPHILS NFR BLD AUTO: 73.4 % (ref 42.7–76)
NRBC BLD AUTO-RTO: 0 /100 WBC (ref 0–0.2)
PLATELET # BLD AUTO: 419 10*3/MM3 (ref 140–450)
PMV BLD AUTO: 9.7 FL (ref 6–12)
POTASSIUM BLD-SCNC: 3.4 MMOL/L (ref 3.5–5.2)
RBC # BLD AUTO: 3.29 10*6/MM3 (ref 3.77–5.28)
SODIUM BLD-SCNC: 136 MMOL/L (ref 136–145)
WBC NRBC COR # BLD: 5.4 10*3/MM3 (ref 3.4–10.8)

## 2019-08-05 PROCEDURE — 97110 THERAPEUTIC EXERCISES: CPT

## 2019-08-05 PROCEDURE — 63710000001 INSULIN REGULAR HUMAN PER 5 UNITS: Performed by: PHYSICAL MEDICINE & REHABILITATION

## 2019-08-05 PROCEDURE — 82962 GLUCOSE BLOOD TEST: CPT

## 2019-08-05 PROCEDURE — 85025 COMPLETE CBC W/AUTO DIFF WBC: CPT | Performed by: PHYSICAL MEDICINE & REHABILITATION

## 2019-08-05 PROCEDURE — 97112 NEUROMUSCULAR REEDUCATION: CPT

## 2019-08-05 PROCEDURE — 92507 TX SP LANG VOICE COMM INDIV: CPT

## 2019-08-05 PROCEDURE — 97535 SELF CARE MNGMENT TRAINING: CPT

## 2019-08-05 PROCEDURE — 80048 BASIC METABOLIC PNL TOTAL CA: CPT | Performed by: PHYSICAL MEDICINE & REHABILITATION

## 2019-08-05 RX ADMIN — AMANTADINE HYDROCHLORIDE 100 MG: 100 CAPSULE ORAL at 06:02

## 2019-08-05 RX ADMIN — PANTOPRAZOLE SODIUM 40 MG: 40 TABLET, DELAYED RELEASE ORAL at 16:40

## 2019-08-05 RX ADMIN — BRIMONIDINE TARTRATE 1 DROP: 2 SOLUTION OPHTHALMIC at 08:00

## 2019-08-05 RX ADMIN — PANTOPRAZOLE SODIUM 40 MG: 40 TABLET, DELAYED RELEASE ORAL at 06:02

## 2019-08-05 RX ADMIN — NEBIVOLOL HYDROCHLORIDE 20 MG: 10 TABLET ORAL at 21:02

## 2019-08-05 RX ADMIN — BRIMONIDINE TARTRATE 1 DROP: 2 SOLUTION OPHTHALMIC at 21:02

## 2019-08-05 RX ADMIN — NYSTATIN 500000 UNITS: 100000 SUSPENSION ORAL at 08:00

## 2019-08-05 RX ADMIN — GLIPIZIDE 5 MG: 5 TABLET ORAL at 08:01

## 2019-08-05 RX ADMIN — ATORVASTATIN CALCIUM 80 MG: 80 TABLET, FILM COATED ORAL at 21:01

## 2019-08-05 RX ADMIN — NEBIVOLOL HYDROCHLORIDE 20 MG: 10 TABLET ORAL at 08:00

## 2019-08-05 RX ADMIN — APIXABAN 5 MG: 5 TABLET, FILM COATED ORAL at 08:00

## 2019-08-05 RX ADMIN — NYSTATIN 500000 UNITS: 100000 SUSPENSION ORAL at 12:05

## 2019-08-05 RX ADMIN — DORZOLAMIDE HYDROCHLORIDE 1 DROP: 20 SOLUTION/ DROPS OPHTHALMIC at 08:00

## 2019-08-05 RX ADMIN — GLIPIZIDE 5 MG: 5 TABLET ORAL at 16:40

## 2019-08-05 RX ADMIN — NYSTATIN 500000 UNITS: 100000 SUSPENSION ORAL at 16:40

## 2019-08-05 RX ADMIN — DORZOLAMIDE HYDROCHLORIDE 1 DROP: 20 SOLUTION/ DROPS OPHTHALMIC at 21:02

## 2019-08-05 RX ADMIN — ASPIRIN 325 MG: 325 TABLET ORAL at 08:01

## 2019-08-05 RX ADMIN — NYSTATIN 500000 UNITS: 100000 SUSPENSION ORAL at 21:02

## 2019-08-05 RX ADMIN — APIXABAN 5 MG: 5 TABLET, FILM COATED ORAL at 21:02

## 2019-08-05 RX ADMIN — INSULIN HUMAN 8 UNITS: 100 INJECTION, SOLUTION PARENTERAL at 11:41

## 2019-08-05 RX ADMIN — LOSARTAN POTASSIUM: 50 TABLET, FILM COATED ORAL at 08:00

## 2019-08-05 NOTE — PROGRESS NOTES
Inpatient Rehabilitation Functional Measures Assessment and Plan of Care    Plan of Care  Updated Problems/Interventions  Mobility    [OT] Toilet Transfers(Active)  Current Status(08/05/2019): MIN  Weekly Goal(08/15/2019): CGA  Discharge Goal: CGA    [OT] Tub/Shower Transfers(Active)  Current Status(08/05/2019): MIN  Weekly Goal(08/15/2019): CGA  Discharge Goal: CGA        Self Care    [OT] Bathing(Active)  Current Status(08/05/2019): MOD  Weekly Goal(08/15/2019): MIN  Discharge Goal: CGA    [OT] Dressing (Lower)(Active)  Current Status(08/05/2019): MOD  Weekly Goal(08/15/2019): MIN  Discharge Goal: CGA    [OT] Dressing (Upper)(Active)  Current Status(08/05/2019): MIN  Weekly Goal(08/15/2019): set up  Discharge Goal: set up    [OT] Grooming(Active)  Current Status(08/05/2019): MOD  Weekly Goal(08/15/2019): MIN  Discharge Goal: CGA    [OT] Toileting(Active)  Current Status(08/05/2019): DEP/MAX  Weekly Goal(08/15/2019): MOD  Discharge Goal: CGA    Functional Measures  KATI Eating:  [place]  KATI Grooming: [place]  KATI Bathing:  [place]  KATI Upper Body Dressing:  [place]  KATI Lower Body Dressing:  [place]  KATI Toileting:  [place]    KATI Bladder Management  Level of Assistance:  [place]  Frequency/Number of Accidents this Shift:  [place]    KATI Bowel Management  Level of Assistance: [place]  Frequency/Number of Accidents this Shift: [place]    KATI Bed/Chair/Wheelchair Transfer:  [place]  KATI Toilet Transfer:  [place]  KATI Tub/Shower Transfer:  [place]    Previously Documented Mode of Locomotion at Discharge: [place]  KATI Expected Mode of Locomotion at Discharge: [place]  KATI Walk/Wheelchair:  [place]  KATI Stairs:  [place]    KATI Comprehension:  [place]  KATI Expression:  [place]  KATI Social Interaction:  [place]  KATI Problem Solving:  [place]  KATI Memory:  [place]    Therapy Mode Minutes  Occupational Therapy: Individual: 60 minutes.  Physical Therapy: [place]  Speech Language Pathology:  [place]    Signed by: Ingris Ansari OT

## 2019-08-05 NOTE — PROGRESS NOTES
Inpatient Rehabilitation Functional Measures Assessment    Functional Measures  KATI Eating:  [facility] Grooming: [facility] Bathing:  [facility] Upper Body Dressing:  [facility] Lower Body Dressing:  [facility] Toileting:  [facility] Bladder Management  Level of Assistance:  [place]  Frequency/Number of Accidents this Shift:  [facility] Bowel Management  Level of Assistance: [place]  Frequency/Number of Accidents this Shift: Branch    [facility] Bed/Chair/Wheelchair Transfer:  [facility] Toilet Transfer:  [facility] Tub/Shower Transfer:  [place]    Previously Documented Mode of Locomotion at Discharge: Field  KATI Expected Mode of Locomotion at Discharge: [facility] Walk/Wheelchair:  [facility] Stairs:  [facility] Comprehension:  Auditory comprehension is the usual mode. Patient does not  comprehend complex/abstract information in their primary language without  assistance from a helper. Comprehension Score = 2, Maximal Prompting. Patient  comprehends basic daily needs 25-49% of the time. Patient understands simple  information via single words or gestures. Requires maximal/a lot of prompting  (most of the time). Patient requires the following assistive device(s): Glasses.    KATI Expression:  Non-vocal expression is the usual mode. Patient does not  express complex/abstract information in their primary language without a helper.  Expression Score = 2, Maximal Prompting. Patient expresses basic daily needs  25-49% of the time. Patient uses only single words or gestures and requires  maximal/a lot of prompting (most of the time). No assistive devices were  required.  KATI Social Interaction:  Social Interaction Score = 2, Maximal Direction.  Patient interacts appropriately 25-49% of the time. Patient requires maximal/a  lot of direction (most of the time) for the following behavior(s):  KATI Problem Solving:  Activity was not observed.  KATI Memory:  Memory Score = 1, Total Assistance. Patient recognizes  and  remembers less than 25% of the time. Patient requires total assistance  (prompting all or almost all of the time) for memory for the following:  Disoriented to person, place, time or situation. Inability to recognize people  or remember instructions/directions requiring short-term recall (minutes, hours,  days). Needs assistance for use of environmental cues. Needs assistance for use  of memory aids. Difficulty remembering verbal tasks.    Therapy Mode Minutes  Occupational Therapy: Branch  Physical Therapy: Branch  Speech Language Pathology:  Branch    Signed by: Eda Allen RN

## 2019-08-05 NOTE — PROGRESS NOTES
Inpatient Rehabilitation Plan of Care Note    Plan of Care  Care Plan Reviewed - No updates at this time.    Safety    Performed Intervention(s)  Bed alarm, wc alarm  Safety rounds  Items within reach      Sphincter Control    Performed Intervention(s)  Monitor intake and output  Encourage fluid intake  Elimination schedule    Signed by: Eda Allen RN

## 2019-08-05 NOTE — PROGRESS NOTES
[facility] CM[facility] Coordinator  Inpatient Rehabilitation Admission    Ethnic Group: White.  Marital Status:  Marital Status: .    IRF Admission Date:  07/31/2019  Admission Class: Initial Rehab.  Admit From:  [facility]    Pre-Hospital Living: Home. Pre-Hospital Living  With: (2) Family/Relatives.    Payment Sources: Primary: Not Listed.  Secondary: Medicare Fee for Service  Impairment Group: 01.2 Right Body Involvement (Left Brain)  Date of Onset of Impairment: 07/17/2019    Etiologic Diagnosis Code(s):  Rank Code      Description  1    I63.9     Cerebral infarction, unspecified    Comorbidities:      Are there any arthritis conditions recorded for Impairment Group, Etiologic  Diagnosis, or Comorbid Conditions that meet all of the regulatory requirements  for IRF classification (in 42 .29(b)(2)(x), (xi), and xii))? No    KATI Bladder Accidents:  0 - Accidents.    Patient used medications/device this  shift.  8/5/2019 6:52:00 AM  Bladder Score = 1.  Five (5) or more  bladder accidents.  KATI Bowel Accident: 0 -Accidents.  Bowel Score = 1.  Five (5) or more  bowel accidents.    Presence of Pressure Ulcer:  No observed/documented pressure ulcers.    MEDICAL NEEDS  Height on Admission:  65 inches.  Weight on Admission:  134 pounds.    QUALITY INDICATORS  Prior Functioning:  Self Care: Patient completed the activities by him/herself, with or without an  assistive device, with no assistance from a helper.  Indoor Mobility: Patient completed the activities by him/herself, with or  without an assistive device, with no assistance from a helper.  Stairs: Patient completed the activities by him/herself, with or without an  assistive device, with no assistance from a helper.  Functional Cognition: Patient completed the activities by him/herself, with or  without an assistive device, with no assistance from a helper.  Prior Device Use: Patient does not use manual or motorized wheelchair or  scooter, mechanical  lift, walker, or an orthotic/prosthesis.    Bladder and Bowel: Bladder Continence: Incontinent daily.  Bowel Continence: Always incontinent (no episodes of continent bowel movements).    Swallowing/Nutritional Status: Tube/parenteral feeding (used wholly or partially  as a means of sustenance)  Special Conditions: Patient did not receive total parenteral nutrition treatment  at the time of admission.    Section I. Active Diagnosis: Comorbidities and Co-existing Conditions:  Diabetes Mellitus (DM) - e.g., diabetic retinopathy, nephropathy, and  neuropathy).  Section J. Health Conditions: Patient has not had any falls in the past year.  Patient has had major surgery during the 100 days prior to admission.  Section M. Skin Conditions  Unhealed Pressure Ulcer/Injuries at Stage 1 or  Higher on Admission:  No.  Section N. Medication:  Potential Clinically Significant Medication Issues: No issues found during  review    Signed by: Star Capps RN

## 2019-08-05 NOTE — PROGRESS NOTES
Case Management  Inpatient Rehabilitation Plan of Care and Discharge Plan Note    Rehabilitation Diagnosis:  Branch  Date of Onset:  Branch    Medical Summary:  Branch  Past Medical History: Branch    Plan of Care  Updated Problems/Interventions  Field    Expected Intensity:  Branch  Interdisciplinary Team:  Seth  Estimated Length of Stay/Anticipated Discharge Date: Branch  Anticipated Discharge Destination:  Anticipated discharge destination from inpatient rehabilitation is community  discharge with assistance. Pt lives with her  and son in a 2 story home,  bed and full bath on the 2nd level, 1/2 bath on the first.  Pt will discharge to  her home with 24 hour family assistance.      Based on the patient's medical and functional status, their prognosis and  expected level of functional improvement is:  Seth    Signed by: ESAU Packer

## 2019-08-05 NOTE — PROGRESS NOTES
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  She attempts some verbalizations, perseverates on yes.  Difficulty following commands.  Does not appear to indicate any headache or abdominal pain.  Continues more alert.       History taken from: patient chart RN    Objective     Vital Signs  Temp:  [97 °F (36.1 °C)-97.8 °F (36.6 °C)] 97.8 °F (36.6 °C)  Heart Rate:  [60-76] 76  Resp:  [16-20] 20  BP: (120-133)/(65-80) 120/65    Objective  Physical Exam  MENTAL STATUS -  AWAKE / ALERT  HEENT- NCAT,   SCLERA NON-ICTERIC, CONJUNCTIVA PINK, OP MOIST, NO JVD, EARS UNREMARKABLE EXTERNALLY  LUNGS - CTA, NO WHEEZES, RALES OR RHONCHI  HEART- RRR, NO RUB, MURMUR, OR GALLOP  ABD - NORMOACTIVE BOWEL SOUNDS, SOFT, NT.    EXT - NO EDEMA OR CYANOSIS  NEURO -alert.  Aphasia.  She will get occasional single word    Does not follow commands.     Right facial droop.   MOTOR EXAM -patient moves the left side more than the right but takes resistance bilaterally         Results Review:     I reviewed the patient's new clinical results.  Glucose   Date/Time Value Ref Range Status   08/05/2019 1602 129 70 - 130 mg/dL Final   08/05/2019 1136 269 (H) 70 - 130 mg/dL Final   08/04/2019 2022 144 (H) 70 - 130 mg/dL Final   08/04/2019 1603 105 70 - 130 mg/dL Final   08/04/2019 1111 216 (H) 70 - 130 mg/dL  Final   08/04/2019 0701 155 (H) 70 - 130 mg/dL Final   08/03/2019 2048 157 (H) 70 - 130 mg/dL Final   08/03/2019 1609 233 (H) 70 - 130 mg/dL Final     Results from last 7 days   Lab Units 08/05/19  0726 08/03/19  0746 08/02/19  0720   WBC 10*3/mm3 5.40 8.60 9.89   HEMOGLOBIN g/dL 9.8* 9.7* 9.0*   HEMATOCRIT % 30.8* 31.1* 28.7*   PLATELETS 10*3/mm3 419 448 428       Ref. Range 5/22/2019 05:44 5/22/2019 11:34 7/9/2019 08:25 7/18/2019 04:49 7/19/2019 05:05 7/23/2019 05:56 8/1/2019 06:48   Hemoglobin Latest Ref Range: 12.0 - 15.9 g/dL 10.8 (L)  10.6 (L) 8.5 (L) 9.7 (L) 9.3 (L) 7.4 (L)   Hematocrit Latest Ref Range: 34.0 - 46.6 % 35.1  33.4 (L) 26.2 (L) 29.9 (L) 28.6 (L) 23.6 (L)     Results from last 7 days   Lab Units 08/05/19  0726 08/02/19  0720 08/01/19  0647   SODIUM mmol/L 136 146* 139   POTASSIUM mmol/L 3.4* 3.7 3.7   CHLORIDE mmol/L 99 105 101   CO2 mmol/L 27.3 29.3* 29.1*   BUN mg/dL 18 30* 23   CREATININE mg/dL 0.87 0.92 0.69   CALCIUM mg/dL 9.1 8.8 9.0   BILIRUBIN mg/dL  --   --  0.3   ALK PHOS U/L  --   --  107   ALT (SGPT) U/L  --   --  31   AST (SGOT) U/L  --   --  32   GLUCOSE mg/dL 276* 193* 123*         Ref. Range 8/1/2019 06:47   25 Hydroxy, Vitamin D Latest Ref Range: 30.0 - 100.0 ng/ml 21.8 (L)       Ref. Range 8/1/2019 06:47   Total Cholesterol Latest Ref Range: 0 - 200 mg/dL 105   HDL Cholesterol Latest Ref Range: 40 - 60 mg/dL 40   LDL Cholesterol  Latest Ref Range: 0 - 100 mg/dL 57   VLDL Cholesterol Latest Ref Range: 5 - 40 mg/dL 8   Triglycerides Latest Ref Range: 0 - 150 mg/dL 40   Medication Review: done  Scheduled Meds:    amantadine 100 mg Oral QAM   apixaban 5 mg Oral Q12H   aspirin 325 mg Oral Daily   atorvastatin 80 mg Oral Nightly   brimonidine 1 drop Both Eyes BID   dorzolamide 1 drop Both Eyes BID   glipiZIDE 5 mg Oral BID AC   insulin regular 0-14 Units Subcutaneous TID AC   losartan-HCTZ (HYZAAR) 100-12.5 combo dose  Oral Daily   [START ON 8/6/2019] metFORMIN 500 mg Oral BID With  Meals   nebivolol 20 mg Oral BID   nystatin 5 mL Swish & Spit 4x Daily   pantoprazole 40 mg Oral BID AC   vitamin D 50,000 Units Oral Q7 Days     Continuous Infusions:   PRN Meds:.•  aluminum-magnesium hydroxide-simethicone  •  dextrose  •  dextrose  •  glucagon (human recombinant)  •  nitroglycerin      Assessment/Plan       Stroke (cerebrum) (CMS/HCC)      Assessment & Plan  Status post left CVA with aphasia and spastic right hemiparesis    Neuro stimulation-amantadine added July 27    Dysphagia-Cortrak feeding tube discontinued on July 31 and started on puréed/honey thick liquid diet with strategies    History of multiple bilateral strokes and left central retinal artery occlusion    History of left greater than right internal carotid artery stenosis-status post left internal carotid artery stent July 17, 2019    History of hypertension - Hyzaar/ nebivolol    History of diabetes mellitus -Lantus/glipizide (home medication metformin 1000 mg twice daily and glipizide 10 mg twice daily)  August 1-blood sugars were low yesterday afternoon after tube feeds discontinued.  Held glipizide last night and this morning and Lantus last night.  Blood sugar elevated at noontime today.  Will receive resume glipizide at a lower dose of 5 mg twice a day with meals.  Continue sliding scale insulin coverage.  She also is on metformin at home.  August 5-add back metformin initially at 500 mg twice a day and continue glipizide 5 mg twice a day, both one half of previous home dose, titrate up as needed.    Stroke prophylaxis-aspirin/Eliquis/atorvastatin    Anemia-August 1-hemoglobin 7.4.  Most recent check on July 23 HGB 9.3.  Will recheck hemoglobin this afternoon.  Hemoccult stool.  Iron studies.  Reticulocyte count.  Recheck CBC in the a.m.  Added Protonix.  Patient is on aspirin and Eliquis.  With recent stroke  will look to transfuse packed red blood cell.  August 2-hemoglobin improved 9.0-1 unit packed red blood cells.  Elevated  reticulocyte count in response to anemia.  Nursing describes dark stool.  Patient discussed with gastroenterology.  At this point unable to do endoscopy as on Eliquis and aspirin.  Will treat symptomatically for now with increasing proton pump inhibitor and transfusing as needed.  August 5-anemia improved-hemoglobin 9.8    DVT prophylaxis-SCDs/anticoagulation    Impulsivity-   Nursing to do re-orientation with the patient.  Room close to the nursing station.    Endocrine-vitamin D deficiency-ergocalciferol 50,000 units weekly x8 weeks added. Vitamin B12 level and TSH checked and late May unremarkable     Impaired cognition/impaired language, impaired swallow, impaired activity daily living, impaired mobility     Now admit for comprehensive acute inpatient rehabilitation .  This would be an interdisciplinary program with physical therapy 1 hour,  occupational therapy 1 hour, and speech therapy 1 hour, 5 days a week.  Rehabilitation nursing for carryover, monitoring of cardiopulmonary and neurologic   status, bowel and bladder, and skin  Ongoing physician follow-up.  Weekly team conferences.  Goals are indeterminant.   Rehabilitation prognosis determined.  Medical prognosis determined.  Estimated length of stay is indeterminate.          Ajit Howard MD  08/05/19  6:30 PM    Time:

## 2019-08-05 NOTE — THERAPY TREATMENT NOTE
Inpatient Rehabilitation - Physical Therapy Treatment Note  [facility]     Patient Name: Darby Workman  : 1945  MRN: [id number]    Today's Date: 2019                 Admit Date: 2019      Visit Dx:    No diagnosis found.    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)   • Right-sided headache   • Acute ischemic stroke (CMS/HCC)   • Embolic stroke (CMS/HCC)   • Cerebral infarction due to stenosis of left carotid artery (CMS/HCC)   • Anticoagulated by anticoagulation treatment   • Stroke (cerebrum) (CMS/HCC)       Therapy Treatment    IRF Treatment Summary     Row Name 19 1044 19 0930          Evaluation/Treatment Time and Intent    Subjective Information  no complaints  -KP  no complaints  -KB     Existing Precautions/Restrictions  fall swallow, communication.  -KP  fall swallow, communication  -KB     Document Type  therapy note (daily note)  -KP  therapy note (daily note)  -KB     Mode of Treatment  physical therapy  -KP  speech-language pathology  -KB     Patient/Family Observations  seated at nurses station withi sup.  Agitated but unable to communicate needs.  -KP  seated in wc at nurses station; attempting to communicate but not able to be understood, tearful/frustrated during session  -KB     Start Time (Evaluation/Treatment)  --  0930  -KB     Stop Time (Evaluation/Treatment)  --  1000  -KB     Recorded by [KP] Marycruz Schmitt, PT [KB] Bruton, Katherine L     Row Name 19 1044             Cognition/Psychosocial- PT/OT    Affect/Mental Status (Cognitive)  unable/difficult to assess  -KP      Orientation Status (Cognition)  unable/difficult to assess  -KP      Follows Commands (Cognition)  follows one step commands;50-74% accuracy;increased processing time needed;physical/tactile prompts  required;repetition of directions required;verbal cues/prompting required  -      Personal Safety Interventions  fall prevention program maintained;gait belt;nonskid shoes/slippers when out of bed;supervised activity  -      Cognitive Function (Cognitive)  attention deficit;memory deficit;safety deficit  -      Attention Deficit (Cognitive)  severe deficit;concentration;distractible in noisy environment;distractible in quiet environment  -      Memory Deficit (Cognitive)  unable/difficult to assess  -      Safety Deficit (Cognitive)  severe deficit;ability to follow commands;at risk behavior observed;awareness of need for assistance;impulsivity;insight into deficits/self awareness;safety precautions follow-through/compliance  -      Recorded by [] Marycruz Schmitt, PT      Row Name 08/05/19 1044             Mobility    Advanced Gait Activity  curb negotiation;rough/uneven surfaces  -      Additional Documentation  Advanced Gait Activity (Row)  -      Recorded by [] Marycruz Schmitt, PT      Row Name 08/05/19 1044             Sit-Stand Transfer    Sit-Stand [place] (Transfers)  contact guard;verbal cues;set up  -KP      Recorded by [] Marycruz Schmitt PT      Row Name 08/05/19 1044             Stand-Sit Transfer    Stand-Sit [place] (Transfers)  contact guard;verbal cues;set up  -KP      Recorded by [] Marycruz Schmitt, PT      Row Name 08/05/19 1044             Toilet Transfer    Type (Toilet Transfer)  stand pivot/stand step  -      [place] Level (Toilet Transfer)  minimum assist (75% patient effort);verbal cues;nonverbal cues (demo/gesture)  -      Assistive Device (Toilet Transfer)  commode;grab bars/safety frame;wheelchair  -KP      Recorded by [] Marycruz Schmitt, PT      Row Name 08/05/19 1044             Gait/Stairs Assessment/Training    [place] Level (Gait)  minimum assist (75% patient effort);contact guard;verbal cues  -      Distance in Feet (Gait)   220, 175 x 2, 90 x 2  -KP      Pattern (Gait)  step-through  -KP      Deviations/Abnormal Patterns (Gait)  ataxic;gait speed decreased;stride length decreased;eliza decreased;base of support, narrow;festinating/shuffling  -KP      Bilateral Gait Deviations  heel strike decreased  -KP      [place] Level (Stairs)  minimum assist (75% patient effort);verbal cues  -KP      Handrail Location (Stairs)  right side (ascending)  -KP      Number of Steps (Stairs)  4  -KP      Ascending Technique (Stairs)  step-over-step  -KP      Descending Technique (Stairs)  step-to-step  -KP      Stairs, Safety Issues  balance decreased during turns;sequencing ability decreased;weight-shifting ability decreased Poor foot placement, poor judgement with sequencing  -KP      Stairs, Impairments  strength decreased;sensation decreased;impaired balance  -      Negotiation (Ramp)  ramp assistive device;handrail location;ramp independence;ascending technique;descending technique  -KP      [place] Level (Ramp)  contact guard  -KP      Comment (Gait/Stairs)  Max cues to attend to ambualtion.    -KP      Recorded by [] Marycruz Schmitt, PT      Row Name 08/05/19 1044             Safety Issues, Functional Mobility    Safety Issues Affecting Function (Mobility)  ability to follow commands;at risk behavior observed;impulsivity;insight into deficits/self awareness;judgment;safety precautions follow-through/compliance  -      Impairments Affecting Function (Mobility)  balance;cognition;endurance/activity tolerance;pain;shortness of breath;strength  -      Comment, Safety Issues/Impairments (Mobility)  poor insight  -KP      Recorded by [] Marycruz Schmitt, PT      Row Name 08/05/19 1044             Curb Negotiation (Mobility)    [place], Curb Negotiation  minimal assist, 75% or more patient effort;verbal cues  -      Comment, [place] Negotiation (Mobility)  VC for technique, foot placement  -KP      Recorded by [] Oskar  Marycruz THORNTON PT      Row Name 08/05/19 1044             Rough/Uneven Surface Gait Skills (Mobility)    [place], Gait on Rough/Uneven Surface (Mobility)  minimal assist, 75% or more patient effort;contact guard;verbal cues  -KP      Recorded by [KP] Marycruz Schmitt PT      Row Name 08/05/19 1044 08/05/19 0930          Pain Scale: Numbers Pre/Post-Treatment    Pain Scale: Numbers, Pretreatment  0/10 - no pain  -KP  0/10 - no pain  -KB     Pain Scale: Numbers, Post-Treatment  0/10 - no pain  -KP  --     Recorded by [KP] Marycruz Schmitt PT [KB] Bruton, Katherine L     Row Name 08/05/19 1044             Balance    Balance  dynamic balance activity;standing balance activity  -KP      Additional Documentation  Balance (Row)  -KP      Recorded by [KP] Marycruz Schmitt PT      Row Name 08/05/19 1044             Dynamic Balance Activity    Therapeutic Training Performed (Dynamic Balance)  obstacle course;360 degree turns;side stepping;backward hopping around cones, over hurdles - diff clearing L  -KP      Support Needed for Balance (Dynamic Balance Training)  minimal external support for balance, 75% patient effort catches R foot  -KP      Comment (Dynamic Balance Training)  Max cues to perform activites.  -KP      Recorded by [KP] Marycruz Schmitt PT      Row Name 08/05/19 1044             Therapeutic Exercise    Therapeutic Exercise  standing, lower extremities;aerobic exercise  -KP      Recorded by [KP] Marycruz Schmitt PT      Row Name 08/05/19 1044             Aerobic Exercise Activity    Exercise Performed (Aerobic, Therapeutic Exercise)  recumbent elliptical  S8, A11, R2  -KP      Time (Aerobic, Therapeutic Exercise)  6  -KP      Comment (Aerobic, Therapeutic Exercise)  Max cues to participate.  -KP      Recorded by [KP] Marycruz Schmitt PT      Row Name 08/05/19 1044             Lower Extremity Standing Therapeutic Exercise    Performed, Standing Lower Extremity (Therapeutic Exercise)  hip  flexion/extension;hip abduction/adduction;heel raises  -KP      Device, Standing Lower Extremity (Therapeutic Exercise)  neftali bars  -KP      Exercise Type, Standing Lower Extremity (Therapeutic Exercise)  AROM (active range of motion)  -KP      Sets/Reps Detail, Standing Lower Extremity (Therapeutic Exercise)  1/10  -KP      Comment, Standing Lower Extremity (Therapeutic Exercise)  Max VC, demo to perform  -KP      Recorded by [] Marycruz Schmitt PT      Row Name 08/05/19 1044             Positioning and Restraints    Pre-Treatment Position  sitting in chair/recliner  -KP      Post Treatment Position  wheelchair  -KP      In Wheelchair  sitting;encouraged to call for assist;exit alarm on;patient within staff view  -KP      Recorded by [KP] Marycruz Schmitt PT        User Key  (r) = Recorded By, (t) = Taken By, (c) = Cosigned By    Initials Name Effective Dates     Marycruz Schmitt PT 04/03/18 -     KB Bruton, Katherine L 03/07/18 -         Wound 07/17/19 1015 Left neck incision (Active)   Dressing Appearance open to air 8/5/2019  7:15 AM   Closure Liquid skin adhesive 8/5/2019  7:15 AM   Base clean;dry 8/4/2019  7:44 PM   Drainage Amount none 8/5/2019  7:15 AM   Dressing Care, Wound open to air 8/4/2019  7:44 PM     Physical Therapy Education     Title: PT OT SLP Therapies (Not Started)     Topic: Physical Therapy (In Progress)     Point: Mobility training (In Progress)     Learning Progress Summary           Patient Nonacceptance, E,TB,D, NR by ENID at 8/5/2019 12:00 PM    Acceptance, E,D, NR by NESTOR at 8/3/2019  1:35 PM    Acceptance, D, DU,NR by NESTOR at 8/3/2019  8:56 AM                   Point: Home exercise program (In Progress)     Learning Progress Summary           Patient Nonacceptance, E,TB,D, NR by ENID at 8/5/2019 12:00 PM                   Point: Precautions (In Progress)     Learning Progress Summary           Patient Acceptance, E, NR by MD at 8/2/2019 10:44 AM    Acceptance, E,D, NR by MD at 8/1/2019  12:16 PM                               User Key     Initials Effective Dates Name Provider Type Discipline    JK 04/03/18 -  Nicole Austin, PT Physical Therapist PT    MD 04/03/18 -  Mira Chadwick PT Physical Therapist PT     04/03/18 -  Marycruz Schmitt PT Physical Therapist PT                  PT Recommendation and Plan                        Time Calculation:     PT Charges     Row Name 08/05/19 1201             Time Calculation    Start Time  1030  -      Stop Time  1130  -      Time Calculation (min)  60 min  -      PT Received On  08/05/19  -      PT - Next Appointment  08/06/19  -        User Key  (r) = Recorded By, (t) = Taken By, (c) = Cosigned By    Initials Name Provider Type     Marycruz Schmitt, PT Physical Therapist          Therapy Charges for Today     Code Description Service Date Service Provider Modifiers Qty    85479099368 HC PT THER PROC EA 15 MIN 8/5/2019 Marycruz Schmitt, PT GP 4                   Marycruz Schmitt, HESHAM  8/5/2019

## 2019-08-05 NOTE — PLAN OF CARE
Problem: Fall Risk (Adult)  Goal: Absence of Fall  Outcome: Ongoing (interventions implemented as appropriate)   08/05/19 1957   Fall Risk (Adult)   Absence of Fall making progress toward outcome       Problem: Patient Care Overview  Goal: Plan of Care Review  Outcome: Ongoing (interventions implemented as appropriate)   08/05/19 1957   Patient Care Overview   IRF Plan of Care Review progress ongoing, continue   Progress, Functional Goals demonstrating adequate progress   Coping/Psychosocial   Plan of Care Reviewed With patient;spouse   OTHER   Outcome Summary a bit uncooperative and agitated at times due to global aphasia.  with for dinner tonight. very unsafe and near nursing station       Problem: Stroke (IRF) (Adult)  Goal: Promote Optimal Functional [place]  Outcome: Ongoing (interventions implemented as appropriate)   08/05/19 1957   Stroke (IRF) (Adult)   Promote Optimal Functional [place] demonstrating adequate progress

## 2019-08-05 NOTE — PROGRESS NOTES
Inpatient Rehabilitation Functional Measures Assessment and Plan of Care    Plan of Care  Updated Problems/Interventions  Mobility    [PT] Bed/Chair/Wheelchair(Active)  Current Status(08/05/2019): Min A  Weekly Goal(08/08/2019): CG  Discharge Goal: CGA    [PT] Walk(Active)  Current Status(08/05/2019): 220, Min A  Weekly Goal(08/13/2019): to and from BR, CGA  Discharge Goal: 250` CGA    Functional Measures  KATI Eating:  Branch  [facility] Grooming: Branch  [facility] Bathing:  Branch  KATI Upper Body Dressing:  Branch  KATI Lower Body Dressing:  Branch  KATI Toileting:  Branch    KATI Bladder Management  Level of Assistance:  Branch  Frequency/Number of Accidents this Shift:  Branch    KATI Bowel Management  Level of Assistance: Branch  Frequency/Number of Accidents this Shift: Branch    KATI Bed/Chair/Wheelchair Transfer:  Activity was not observed.  KATI Toilet Transfer:  Branch  KATI Tub/Shower Transfer:  Branch    Previously Documented Mode of Locomotion at Discharge: Field  [facility] Expected Mode of Locomotion at Discharge: Branch  KATI Walk/Wheelchair:  WHEELCHAIR OBSERVATION   Activity was not observed.    WALK OBSERVATION   Walk Distance Scale = 3.  Distance walked is greater than 150 feet. Walk Score  = 4.  Patient performs 75% or more of effort and requires minimal assistance.  Incidental help/contact guard/steadying was provided. Patient walked a distance  of  220 feet. No assistive devices were required.  KATI Stairs:  Stairs Score = 2.  Incidental assistance with lifting or lowering,  contact guard or steadying was provided. Patient performs 75% or more of effort  and requires minimal contact assistance. Patient negotiated  4 stairs. Patient  requires the following assistive device(s): Handrail(s).    KATI Comprehension:  Branch  KATI Expression:  Branch  [facility] Social Interaction:  Branch  [facility] Problem Solving:  Branch  [facility] Memory:  Branch    Therapy Mode Minutes  Occupational Therapy: Branch  Physical Therapy: Individual: 60  minutes.  Speech Language Pathology:  Branch    Signed by: Marycruz Schmitt, PT

## 2019-08-05 NOTE — THERAPY TREATMENT NOTE
Inpatient Rehabilitation - Speech Language Pathology Treatment Note    [facility]    Patient Name: Darby Workman  : 1945  MRN: [id number]  Today's Date: 2019      Admit Date: 2019  Visit Dx:    No diagnosis found.    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)   • Right-sided headache   • Acute ischemic stroke (CMS/HCC)   • Embolic stroke (CMS/HCC)   • Cerebral infarction due to stenosis of left carotid artery (CMS/HCC)   • Anticoagulated by anticoagulation treatment   • Stroke (cerebrum) (CMS/HCC)     Therapy Treatment  Evaluation/Coping  Evaluation/Treatment Time and Intent  Subjective Information: no complaints (19 1400 : Bruton, Katherine L)  Existing Precautions/Restrictions: fall(swallow, communication) (19 1400 : Bruton, Katherine L)  Document Type: therapy note (daily note) (19 1400 : Bruton, Katherine L)  Mode of Treatment: speech-language pathology (19 1400 : Bruton, Katherine L)  Patient/Family Observations: seated in wc in OT; make up session from 1230 refusal of ST (19 1400 : Bruton, Katherine L)  Start Time (Evaluation/Treatment): 1400 (19 1400 : Bruton, Katherine L)  Stop Time (Evaluation/Treatment): 1430 (19 1400 : Bruton, Katherine L)    Vitals/Pain/Safety  Pain Scale: Numbers Pre/Post-Treatment  Pain Scale: Numbers, Pretreatment: 0/10 - no pain (19 1400 : Bruton, Katherine L)    EDUCATION  The patient has been educated in the following areas:   Communication Impairment.    SLP Recommendation and Plan  SLP Diagnosis: severe global aphasia, cognitive impairment  SLP Diagnosis: severe global aphasia, cognitive impairment  Rehab Potential/Prognosis: good  Anticipated Dischage Disposition: home with assist, anticipate therapy at next level of care  Predicted  "Duration Therapy Intervention (Days): until discharge  Plan of Care Reviewed With: patient    SLP GOALS     Row Name 08/05/19 1400 08/05/19 0930 08/03/19 1000       Oral Nutrition/Hydration Goal 2 (SLP)    Barriers (Oral Nutrition/Hydration Goal 2, SLP)  --  --  pt displayed apraxic behavior- trying to drink from pudding cup, using sppon backwards/upside down- did not follow thru once displayed the correct method for use- very perseverative ; anterior spillage noted with puree and pt required intermittent tactile cues to slow rate/swallow one bite before placing another one in mouth; mild oral residue noted; no overt clinical s/s aspiration noted during breakfast on puree , honey thick liquids  -SL    Progress/Outcomes (Oral Nutrition/Hydration Goal 2, SLP)  --  --  goal ongoing  -SL       Words/Phrases/Sentences Goal 1 (SLP)    Improve Ability to Comprehend Words/Phrases/Sentences Through: Goal 1 (SLP)  --  --  identify pictures, field of  -SL    Progress (Ability to Contruct Words/Phrases/Sentences Goal 1, SLP)  with maximum cues (25-49%)  -KB  --  50%;with maximum cues (25-49%) ID - common pictures- RF of 2  -SL    Progress/Outcomes (Identify Objects and Pictures Goal 1, SLP)  goal ongoing  -KB  --  goal ongoing  -SL    Comment (Words/Phrases/Sentences Goal 1, SLP)  30% identifying named body parts, 90% with direct physical model  -KB  --  max cues required for pointing response  -SL       Comprehend Questions Goal 1 (SLP)    Progress (Ability to Comprehend Questions Goal 1, SLP)  --  --  60%;with minimal cues (75-90%) simple personal yes/no questions  -SL    Progress/Outcomes (Comprehend Questions Goal 1, SLP)  --  --  goal ongoing  -SL    Comment (Comprehend Questions Goal 1, SLP)  --  --  pt did appear to display a \"yes\" preference  -SL       Follow Directions Goal 2 (SLP)    Improve Ability to Follow Directions Goal 1 (SLP)  --  --  1 step direction with objects;1 step direction without objects;80%;with " minimal cues (75-90%)  -SL    Time Frame (Follow Directions Goal 1, SLP)  --  --  by discharge  -SL    Progress (Ability to Follow Directions Goal 1, SLP)  --  --  10%;independently (over 90% accuracy);60%;with maximum cues (25-49%) simple 1 step body commands  -SL    Progress/Outcomes (Follow Directions Goal 1, SLP)  --  --  goal ongoing  -SL    Comment (Follow Directions Goal 1, SLP)  --  --  required model and tactile cues for most directives  -SL       Reading Comprehension of Basic Signs and Letters Goal 1 (SLP)    Reading Comprehension of Basic Signs and Letters Goal 1 (SLP)  --  match printed letter to picture;match printed letter to spoken letter;90%;independently (over 90% accuracy)  -KB  --    Time Frame (Reading Comprehension of Basic Signs and Letters Goal 1, SLP)  --  by discharge  -KB  --    Barriers (Reading Comprehension of Basic Signs and Letters Goal 1, SLP)  --  new goal  -KB  --    Progress (Reading Comprehension of Basic Signs and Letters Goal 1, SLP)  with moderate cues (50-74%)  -KB  with moderate cues (50-74%)  -KB  --    Progress/Outcomes (Reading Comprehension of Basic Signs and Letters Goal 1, SLP)  goal ongoing  -KB  goal ongoing  -KB  --    Comment (Reading Comprehension of Basic Signs and Letters Goal 1, SLP)  50% identifying named letter in fo2  -KB  60% identifying named letter in fo2  -KB  --       Word Retrieval Skills Goal 1 (SLP)    Progress (Word Retrieval Skills Goal 1, SLP)  --  with maximum cues (25-49%)  -KB  --    Progress/Outcomes (Word Retrieval Goal 1, SLP)  --  goal ongoing  -KB  --    Comment (Word Retrieval Goal 1, SLP)  --  30%, 50%, 30% couting 1-10 with direct verbal model across 3 trials respectively  -KB  --       Word Retrieval Skills Goal 2 (SLP)    Progress/Outcomes (Word Retrieval Goal 2, SLP)  --  goal ongoing  -KB  --    Comment (Word Retrieval Goal 2, SLP)  --  increased accuracy with some functional phrases, but not able to complete an entire thought (i.e.  "\"I just don't know what...\", \"There's so much...\")  -KB  --       Graphic Expression of Shapes, Letters, Numbers Goal 1 (SLP)    Progress (Graphic Expression of Shapes, Letters, and Numbers Goal 1, SLP)  with moderate cues (50-74%);with maximum cues (25-49%)  -KB  --  --    Progress/Outcomes (Graphic Expression of Shapes, Letters, and Numbers Goal 1, SLP)  goal ongoing  -KB  --  --    Comment (Graphic Expression of Shapes, Letters, and Numbers Goal 1, SLP)  60% accuracy arranging letter manipulatives to spell her name given written model; 40% accuracy copying her written name given written model, [place] assist and written letters to trace required to complete her name  -KB  --  --       Planning and Execution of Connected Speech Goal 1 (SLP)    Barriers (Planning and Execution of Connected Speech Goal 1, SLP)  --  --  attempted auto speech- counting w/visual cues- 30% - pt noted to perseverate on numbers \"4 \" and \"5';  unable to produce any day of week adequately, however utterances were very difficult to distinguish due to low vocal intensity;  a few words were elicited when singing songs- but only approx 10% w/max cues  -    Progress (Planning and Execution of Connected Speech Goal 1, SLP)  --  --  with maximum cues (25-49%)  -    Progress/Outcomes (Planning and Execution of Connected Speech Goal 1, SLP)  --  --  goal ongoing  -       Additional Goal 1 (SLP)    Additional Goal 1, SLP  --  Complete diagnostic treatment tasks for cognition.   -KB  --    Time Frame (Additional Goal 1, SLP)  --  by discharge  -KB  --    Barriers (Additional Goal 1, SLP)  --  100% sequencing visual numbers 1-10; 71% matching like shapes from one side of the page to the other  -KB  --    Progress/Outcomes (Additional Goal 1, SLP)  --  goal ongoing  -KB  --      User Key  (r) = Recorded By, (t) = Taken By, (c) = Cosigned By    Initials Name Provider Type    Carla Kearns MS CCC-SLP Speech and Language Pathologist    KB Bruton, " Jenna THORNTON Speech and Language Pathologist        Time Calculation:   Time Calculation- SLP     Row Name 08/05/19 1430 08/05/19 1000          Time Calculation- SLP    SLP Start Time  1400  -KB  0930  -KB     SLP Stop Time  1430  -KB  1000  -KB     SLP Time Calculation (min)  30 min  -KB  30 min  -KB     SLP Received On  08/05/19  -KB  --       User Key  (r) = Recorded By, (t) = Taken By, (c) = Cosigned By    Initials Name Provider Type    KB Bruton, Katherine L Speech and Language Pathologist        Therapy Charges for Today     Code Description Service Date Service Provider Modifiers Qty    [id number]  ST TREATMENT SPEECH 4 8/5/2019 Bruton, Katherine L GN 1        Katherine L Bruton  8/5/2019

## 2019-08-05 NOTE — THERAPY TREATMENT NOTE
Inpatient Rehabilitation - Occupational Therapy Treatment Note    [facility]     Patient Name: Darby Workman  : 1945  MRN: [id number]    Today's Date: 2019                 Admit Date: 2019      Visit Dx:  No diagnosis found.    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)   • Right-sided headache   • Acute ischemic stroke (CMS/HCC)   • Embolic stroke (CMS/HCC)   • Cerebral infarction due to stenosis of left carotid artery (CMS/HCC)   • Anticoagulated by anticoagulation treatment   • Stroke (cerebrum) (CMS/HCC)         Therapy Treatment    IRF Treatment Summary     Row Name 19 1601 19 1400 19 1044       Evaluation/Treatment Time and Intent    Subjective Information  no complaints  -AF  no complaints  -KB  no complaints  -KP    Existing Precautions/Restrictions  fall  -AF  fall swallow, communication  -KB  fall swallow, communication.  -    Document Type  therapy note (daily note)  -AF  therapy note (daily note)  -KB  therapy note (daily note)  -    Mode of Treatment  occupational therapy  -AF  speech-language pathology  -KB  physical therapy  -KP    Patient/Family Observations  in AM seated in dining room, in PM seated behind NSG station required MAX encouragemnt/redirection and coaxing to move from behind the NSG station and go to the gym. pt place her feet on the floor and wanted ot hold on to the desk   -AF  seated in wc in OT; make up session from 1230 refusal of ST  -KB  seated at nurses station withi sup.  Agitated but unable to communicate needs.  -KP    Start Time (Evaluation/Treatment)  --  1400  -KB  --    Stop Time (Evaluation/Treatment)  --  1430  -KB  --    Recorded by [AF] Ingris Ansari, OTR [KB] Bruton, Katherine L [KP] Marycruz Schmitt, PT    Row Name 19 [id number]              Evaluation/Treatment Time and Intent    Subjective Information  no complaints  -KB      Existing Precautions/Restrictions  fall swallow, communication  -KB      Document Type  therapy note (daily note)  -KB      Mode of Treatment  speech-language pathology  -KB      Patient/Family Observations  seated in wc at nurses station; attempting to communicate but not able to be understood, tearful/frustrated during session  -KB      Start Time (Evaluation/Treatment)  0930  -KB      Stop Time (Evaluation/Treatment)  1000  -KB      Recorded by [KB] Bruton, Katherine L Row Name 08/05/19 1601 08/05/19 1044          Cognition/Psychosocial- PT/OT    Affect/Mental Status (Cognitive)  unable/difficult to assess;agitated agitated in PM session, unable to compelte any task  -AF  unable/difficult to assess  -KP     Orientation Status (Cognition)  unable/difficult to assess  -AF  unable/difficult to assess  -KP     Follows Commands (Cognition)  follows one step commands;0-24% accuracy;increased processing time needed;physical/tactile prompts required;repetition of directions required;verbal cues/prompting required  -AF  follows one step commands;50-74% accuracy;increased processing time needed;physical/tactile prompts required;repetition of directions required;verbal cues/prompting required  -KP     Personal Safety Interventions  fall prevention program maintained;gait belt;nonskid shoes/slippers when out of bed  -AF  fall prevention program maintained;gait belt;nonskid shoes/slippers when out of bed;supervised activity  -KP     Cognitive Function (Cognitive)  --  attention deficit;memory deficit;safety deficit  -KP     Attention Deficit (Cognitive)  severe deficit  -AF  severe deficit;concentration;distractible in noisy environment;distractible in quiet environment  -KP     Memory Deficit (Cognitive)  unable/difficult to assess  -AF  unable/difficult to assess  -KP     Safety Deficit (Cognitive)  --  severe deficit;ability to  follow commands;at risk behavior observed;awareness of need for assistance;impulsivity;insight into deficits/self awareness;safety precautions follow-through/compliance  -KP     Recorded by [AF] Ingris Ansari, SALR [KP] Marycruz Schmitt, PT     Row Name 08/05/19 1044             Mobility    Advanced Gait Activity  curb negotiation;rough/uneven surfaces  -KP      Additional Documentation  Advanced Gait Activity (Row)  -KP      Recorded by [KP] Marycruz Schmitt PT      Row Name 08/05/19 1044             Sit-Stand Transfer    Sit-Stand [place] (Transfers)  contact guard;verbal cues;set up  -KP      Recorded by [KP] Marycruz Schmitt, PT      Row Name 08/05/19 1044             Stand-Sit Transfer    Stand-Sit [place] (Transfers)  contact guard;verbal cues;set up  -KP      Recorded by [KP] Marycruz Schmitt, PT      Row Name 08/05/19 1601 08/05/19 1044          Toilet Transfer    Type (Toilet Transfer)  stand pivot/stand step  -AF  stand pivot/stand step  -KP     [place] Level (Toilet Transfer)  minimum assist (75% patient effort);verbal cues  -AF  minimum assist (75% patient effort);verbal cues;nonverbal cues (demo/gesture)  -KP     Assistive Device (Toilet Transfer)  commode;grab bars/safety frame  -AF  commode;grab bars/safety frame;wheelchair  -KP     Recorded by [AF] Ingris Ansari, SALR [KP] Marycruz Schmitt, PT     Row Name 08/05/19 1044             Gait/Stairs Assessment/Training    [place] Level (Gait)  minimum assist (75% patient effort);contact guard;verbal cues  -KP      Distance in Feet (Gait)  220, 175 x 2, 90 x 2  -KP      Pattern (Gait)  step-through  -KP      Deviations/Abnormal Patterns (Gait)  ataxic;gait speed decreased;stride length decreased;eliza decreased;base of support, narrow;festinating/shuffling  -KP      Bilateral Gait Deviations  heel strike decreased  -KP      [place] Level (Stairs)  minimum assist (75% patient effort);verbal cues  -KP      Handrail Location  (Stairs)  right side (ascending)  -KP      Number of Steps (Stairs)  4  -KP      Ascending Technique (Stairs)  step-over-step  -KP      Descending Technique (Stairs)  step-to-step  -KP      Stairs, Safety Issues  balance decreased during turns;sequencing ability decreased;weight-shifting ability decreased Poor foot placement, poor judgement with sequencing  -KP      Stairs, Impairments  strength decreased;sensation decreased;impaired balance  -KP      Negotiation (Ramp)  ramp assistive device;handrail location;ramp independence;ascending technique;descending technique  -KP      [place] Level (Ramp)  contact guard  -KP      Comment (Gait/Stairs)  Max cues to attend to ambualtion.    -KP      Recorded by [] Marycruz Schmitt, PT      Row Name 08/05/19 1044             Safety Issues, Functional Mobility    Safety Issues Affecting Function (Mobility)  ability to follow commands;at risk behavior observed;impulsivity;insight into deficits/self awareness;judgment;safety precautions follow-through/compliance  -KP      Impairments Affecting Function (Mobility)  balance;cognition;endurance/activity tolerance;pain;shortness of breath;strength  -KP      Comment, Safety Issues/Impairments (Mobility)  poor insight  -KP      Recorded by [] Marycruz Schmitt, PT      Row Name 08/05/19 1044             Curb Negotiation (Mobility)    [place], Curb Negotiation  minimal assist, 75% or more patient effort;verbal cues  -      Comment, Curb Negotiation (Mobility)  VC for technique, foot placement  -KP      Recorded by [] Marycruz Schmitt, PT      Row Name 08/05/19 1044             Rough/Uneven Surface Gait Skills (Mobility)    [place], Gait on Rough/Uneven Surface (Mobility)  minimal assist, 75% or more patient effort;contact guard;verbal cues  -KP      Recorded by [] Marycruz Schmitt, PT      Row Name 08/05/19 1601             Basic Activities of Daily Living (BADLs)    Basic Activities of Daily Living  toileting   -AF      Recorded by [AF] Ingris Ansari, OTR      Row Name 08/05/19 1601             Bathing Assessment/Treatment    Comment (Bathing)  deferred bathing even with coaxing from the therapist  -AF      Recorded by [AF] Ingris Ansari, OTR      Row Name 08/05/19 1601             Upper Body Dressing Assessment/Treatment    Comment (Upper Body Dressing)  deferred wouldn't attempt to compelte  -AF      Recorded by [AF] Ingris Ansari, OTR      Row Name 08/05/19 1601             Lower Body Dressing Assessment/Treatment    Lower Body Dressing [place] Level  don;shoes/slippers;moderate assist (50% patient effort)  -AF      Lower Body Dressing Position  supported sitting  -AF      Comment (Lower Body Dressing)  deferred LBD   -AF      Recorded by [AF] Ingris Ansari, OTR      Row Name 08/05/19 1601             Grooming Assessment/Treatment    Grooming [place] Level  grooming skills;oral care regimen;hair care, combing/brushing;moderate assist (50% patient effort);verbal cues  -AF      Grooming Position  supported sitting;sink side  -AF      Comment (Grooming)  assist wtih hair for throughness, brushed teeth with set up   -AF      Recorded by [AF] Ingris Ansari, OTR      Row Name 08/05/19 1601             Toileting Assessment/Treatment    Toileting [place] Level  toileting skills;dependent (less than 25% patient effort);maximum assist (25% patient effort)  -AF      Assistive Device Use (Toileting)  grab bar/safety frame;raised toilet seat  -AF      Toileting Position  unsupported sitting;supported standing  -AF      Comment (Toileting)  pt unable to fasten/unfasten pants and required assistance for bowel hygiene  -AF      Recorded by [AF] Ingris Ansari, OTR      Row Name 08/05/19 1400 08/05/19 1044 08/05/19 0930       Pain Scale: Numbers Pre/Post-Treatment    Pain Scale: Numbers, Pretreatment  0/10 - no pain  -KB  0/10 - no pain  -KP  0/10 - no pain  -KB    Pain Scale: Numbers,  Post-Treatment  --  0/10 - no pain  -KP  --    Recorded by [KB] Bruton, Katherine L [KP] Marycruz Schmitt PT [KB] Bruton, Katherine L    Row Name 08/05/19 1044             Balance    Balance  dynamic balance activity;standing balance activity  -KP      Additional Documentation  Balance (Row)  -KP      Recorded by [KP] Marycruz Schmitt PT      Row Name 08/05/19 1044             Dynamic Balance Activity    Therapeutic Training Performed (Dynamic Balance)  obstacle course;360 degree turns;side stepping;backward hopping around cones, over hurdles - diff clearing L  -KP      Support Needed for Balance (Dynamic Balance Training)  minimal external support for balance, 75% patient effort catches R foot  -KP      Comment (Dynamic Balance Training)  Max cues to perform activites.  -KP      Recorded by [KP] Marycruz Schmitt PT      Row Name 08/05/19 1044             Therapeutic Exercise    Therapeutic Exercise  standing, lower extremities;aerobic exercise  -KP      Recorded by [KP] Marycruz Schmitt PT      Row Name 08/05/19 1044             Aerobic Exercise Activity    Exercise Performed (Aerobic, Therapeutic Exercise)  recumbent elliptical  S8, A11, R2  -KP      Time (Aerobic, Therapeutic Exercise)  6  -KP      Comment (Aerobic, Therapeutic Exercise)  Max cues to participate.  -KP      Recorded by [KP] Marycruz Schmitt PT      Row Name 08/05/19 1044             Lower Extremity Standing Therapeutic Exercise    Performed, Standing Lower Extremity (Therapeutic Exercise)  hip flexion/extension;hip abduction/adduction;heel raises  -KP      Device, Standing Lower Extremity (Therapeutic Exercise)  neftali bars  -KP      Exercise Type, Standing Lower Extremity (Therapeutic Exercise)  AROM (active range of motion)  -KP      Sets/Reps Detail, Standing Lower Extremity (Therapeutic Exercise)  1/10  -KP      Comment, Standing Lower Extremity (Therapeutic Exercise)  Max VC, demo to perform  -KP      Recorded by [KP] Marycruz Schmitt  HILLARY, PT      Row Name 08/05/19 1601             Neuromuscular Re-education    Comment (Neuromuscular Re-education)  in PM session pt did drink her orange juice, therapist attempted to have patient fold paper and stuff envelopes, would complete parts of task maybe once or twice before refusing to help or pushing away. pt seems very upset and not able to understand why she is here  -AF      Recorded by [AF] Ingris Ansari, OTR      Row Name 08/05/19 1601 08/05/19 1044          Positioning and Restraints    Pre-Treatment Position  sitting in chair/recliner  -AF  sitting in chair/recliner  -KP     Post Treatment Position  wheelchair  -AF  wheelchair  -KP     In Wheelchair  sitting;exit alarm on;patient within staff view;with SLP at NS station in AM, with SLP in PM  -AF  sitting;encouraged to call for assist;exit alarm on;patient within staff view  -KP     Recorded by [AF] Ingris Ansari, OTR [KP] Marycruz Schmitt, PT       User Key  (r) = Recorded By, (t) = Taken By, (c) = Cosigned By    Initials Name Effective Dates    AF Ingris Ansari, SALR 04/03/18 -     Marycruz Norwood PT 04/03/18 -     KB Bruton, Katherine L 03/07/18 -           Wound 07/17/19 1015 Left neck incision (Active)   Dressing Appearance open to air 8/5/2019  7:15 AM   Closure Liquid skin adhesive 8/5/2019  7:15 AM   Base clean;dry 8/4/2019  7:44 PM   Drainage Amount none 8/5/2019  7:15 AM   Dressing Care, Wound open to air 8/4/2019  7:44 PM         OT Recommendation and Plan    Anticipated Equipment Needs At Discharge (OT Eval): bathing equipment  Planned Therapy Interventions (OT Eval): activity tolerance training, BADL retraining, cognitive/visual perception retraining, functional balance retraining, neuromuscular control/coordination retraining, occupation/activity based interventions, patient/caregiver education/training, ROM/therapeutic exercise, strengthening exercise, transfer/mobility retraining            OT IRF GOALS     Row Name  08/01/19 1159             Transfer Goal 1 (OT-IRF)    Activity/Assistive Device (Transfer Goal 1, OT-IRF)  toilet;shower chair;walk-in shower  -AF      [place] Level (Transfer Goal 1, OT-IRF)  verbal cues required;minimum assist (75% or more patient effort);contact guard assist  -AF      Time Frame (Transfer Goal 1, OT-IRF)  short term goal (STG)  -AF      Progress/Outcomes (Transfer Goal 1, OT-IRF)  goal ongoing  -AF         Transfer Goal 2 (OT-IRF)    Activity/Assistive Device (Transfer Goal 2, OT-IRF)  shower chair;walk-in shower;toilet  -AF      [place] Level (Transfer Goal 2, OT-IRF)  contact guard assist;verbal cues required  -AF      Time Frame (Transfer Goal 2, OT-IRF)  long term goal (LTG)  -AF      Progress/Outcomes (Transfer Goal 2, OT-IRF)  goal ongoing  -AF         Bathing Goal 1 (OT-IRF)    Activity/Device (Bathing Goal 1, OT-IRF)  bathing skills, all;grab bar/tub rail;hand-held shower spray hose;shower chair  -AF      [place] Level (Bathing Goal 1, OT-IRF)  minimum assist (75% or more patient effort);verbal cues required  -AF      Time Frame (Bathing Goal 1, OT-IRF)  short term goal (STG)  -AF      Progress/Outcomes (Bathing Goal 1, OT-IRF)  goal ongoing  -AF         Bathing Goal 2 (OT-IRF)    Activity/Device (Bathing Goal 2, OT-IRF)  bathing skills, all;grab bar/tub rail;hand-held shower spray hose;shower chair  -AF      [place] Level (Bathing Goal 2, OT-IRF)  contact guard assist;verbal cues required  -AF      Time Frame (Bathing Goal 2, OT-IRF)  long term goal (LTG)  -AF      Progress/Outcomes (Bathing Goal 2, OT-IRF)  goal ongoing  -AF         UB Dressing Goal 1 (OT-IRF)    Activity/Device (UB Dressing Goal 1, OT-IRF)  upper body dressing  -AF      [place] (UB Dress Goal 1, OT-IRF)  set-up required  -AF      Time Frame (UB Dressing Goal 1, OT-IRF)  long term goal (LTG)  -AF      Progress/Outcomes (UB Dressing Goal 1, OT-IRF)  goal ongoing  -AF         LB Dressing Goal 1  (OT-IRF)    Activity/Device (LB Dressing Goal 1, OT-IRF)  lower body dressing  -AF      [place] (LB Dressing Goal 1, OT-IRF)  moderate assist (50-74% patient effort);verbal cues required  -AF      Time Frame (LB Dressing Goal 1, OT-IRF)  short term goal (STG)  -AF      Progress/Outcomes (LB Dressing Goal 1, OT-IRF)  goal ongoing  -AF         LB Dressing Goal 2 (OT-IRF)    Activity/Device (LB Dressing Goal 2, OT-IRF)  lower body dressing  -AF      [place] (LB Dressing Goal 2, OT-IRF)  contact guard assist;verbal cues required  -AF      Time Frame (LB Dressing Goal 2, OT-IRF)  long term goal (LTG)  -AF      Progress/Outcomes (LB Dressing Goal 2, OT-IRF)  goal ongoing  -AF         Grooming Goal 1 (OT-IRF)    Activity/Device (Grooming Goal 1, OT-IRF)  grooming skills, all  -AF      [place] (Grooming Goal 1, OT-IRF)  minimum assist (75% or more patient effort);verbal cues required  -AF      Time Frame (Grooming Goal 1, OT-IRF)  short term goal (STG)  -AF      Progress/Outcomes (Grooming Goal 1, OT-IRF)  goal ongoing  -AF         Grooming Goal 2 (OT-IRF)    Activity/Device (Grooming Goal 2, OT-IRF)  grooming skills, all  -AF      [place] (Grooming Goal 2, OT-IRF)  supervision required;verbal cues required  -AF      Time Frame (Grooming Goal 2, OT-IRF)  long term goal (LTG)  -AF      Progress/Outcomes (Grooming Goal 2, OT-IRF)  goal ongoing  -AF         Toileting Goal 1 (OT-IRF)    Activity/Device (Toileting Goal 1, OT-IRF)  toileting skills, all;grab bar/safety frame;raised toilet seat  -AF      [place] Level (Toileting Goal 1, OT-IRF)  moderate assist (50-74% patient effort);verbal cues required  -AF      Time Frame (Toileting Goal 1, OT-IRF)  short term goal (STG)  -AF      Progress/Outcomes (Toileting Goal 1, OT-IRF)  goal ongoing  -AF         Toileting Goal 2 (OT-IRF)    Activity/Device (Toileting Goal 2, OT-IRF)  toileting skills, all;grab bar/safety frame;raised toilet seat  -AF       [place] Level (Toileting Goal 2, OT-IRF)  verbal cues required;contact guard assist  -AF      Time Frame (Toileting Goal 2, OT-IRF)  long term goal (LTG)  -AF      Progress/Outcomes (Toileting Goal 2, OT-IRF)  goal ongoing  -AF         Caregiver Training Goal 1 (OT-IRF)    Caregiver Training Goal 1 (OT-IRF)  Family and patient to demo safe technique with ADLs, transfers, HEP and adaptive equipment as needed   -AF      Time Frame (Caregiver Training Goal 1, OT-IRF)  long term goal (LTG)  -AF      Progress/Outcomes (Caregiver Training Goal 1, OT-IRF)  goal ongoing  -AF        User Key  (r) = Recorded By, (t) = Taken By, (c) = Cosigned By    Initials Name Provider Type    Ingris Youssef OTR Occupational Therapist                     Time Calculation:     Time Calculation- OT     Row Name 08/05/19 1608 08/05/19 1607          Time Calculation- OT    OT Start Time  1330  -AF  0830  -AF     OT Stop Time  1400  -AF  0900  -AF     OT Time Calculation (min)  30 min  -AF  30 min  -AF       User Key  (r) = Recorded By, (t) = Taken By, (c) = Cosigned By    Initials Name Provider Type    Ingris Youssef OTR Occupational Therapist          Therapy Charges for Today     Code Description Service Date Service Provider Modifiers Qty    00805924108 HC OT SELF CARE/MGMT/TRAIN EA 15 MIN 8/5/2019 Ingris Ansari OTR GO 3    71978117071 HC OT NEUROMUSC RE EDUCATION EA 15 MIN 8/5/2019 Ingris Ansari OTR GO 1                   JOHN Mejia  8/5/2019

## 2019-08-05 NOTE — PLAN OF CARE
Problem: Skin Injury Risk (Adult)  Goal: Skin Health and Integrity  Outcome: Ongoing (interventions implemented as appropriate)      Problem: Fall Risk (Adult)  Goal: Absence of Fall  Outcome: Ongoing (interventions implemented as appropriate)      Problem: Patient Care Overview  Goal: Plan of Care Review   08/05/19 0326   Patient Care Overview   IRF Plan of Care Review progress ongoing, continue   Progress, Functional Goals demonstrating adequate progress   Coping/Psychosocial   Plan of Care Reviewed With patient;spouse   OTHER   Outcome Summary Patient is cooperative. Dysarthric. No s/s of pain or discomfort noted. Taking her medication with pudding. Impulsive at times. Bed alarm set on zone 2. Reminded to use the call light.        Problem: Stroke (IRF) (Adult)  Goal: Promote Optimal Functional [place]  Outcome: Ongoing (interventions implemented as appropriate)

## 2019-08-05 NOTE — PROGRESS NOTES
Inpatient Rehabilitation Plan of Care Note    Plan of Care  Care Plan Reviewed - No updates at this time.    Psychosocial    Performed Intervention(s)  Assist patient to express needs and concerns      Safety    Performed Intervention(s)  Bed alarm, wc alarm  Safety rounds      Body Systems    Performed Intervention(s)  Daily skin inspection    Signed by: Rogers Erickson RN

## 2019-08-05 NOTE — PROGRESS NOTES
Inpatient Rehabilitation Functional Measures Assessment    Functional Measures  KATI Eating:  [facility] Grooming: [facility] Bathing:  [place]  [facility] Upper Body Dressing:  [place]  [facility] Lower Body Dressing:  [place]  [facility] Toileting:  [facility] Bladder Management  Level of Assistance:  [place]  Frequency/Number of Accidents this Shift:  [facility] Bowel Management  Level of Assistance: [place]  Frequency/Number of Accidents this Shift: [place]    [facility] Bed/Chair/Wheelchair Transfer:  [facility] Toilet Transfer:  [facility] Tub/Shower Transfer:  [place]    Previously Documented Mode of Locomotion at Discharge: Field  KATI Expected Mode of Locomotion at Discharge: [facility] Walk/Wheelchair:  [facility] Stairs:  [facility] Comprehension:  Auditory comprehension is the usual mode. Patient does not  comprehend complex/abstract information in their primary language without  assistance from a helper. Comprehension Score = 3, Moderate Prompting. Patient  comprehends basic daily needs or ideas 50-74% of the time. Patient requires  moderate/some prompting. No assistive devices were required.  KATI Expression:  Vocal expression is the usual mode. Patient does not express  complex/abstract information in their primary language without a helper.  Expression Score = 3, Moderate Prompting. Patient expresses basic daily needs or  ideas 50-74% of the time. Patient requires moderate/some prompting. Patient  requires the following assistive device(s): severe global aphasia .  KATI Social Interaction:  Social Interaction Score = 6, Modified Independent.  Patient is modified independent for social interaction, occasionally losing  control, but self-corrects.  KATI Problem Solving:  Patient does not make appropriate decisions in order to  solve complex problems without assistance from a helper. Problem Solving Score =  2, Maximal Direction. Patient makes appropriate decisions in order to solve  routine problems 25-49% of the time. Patient  requires maximal direction for the  following behavior(s): Impulsivity.  KATI Memory:  Memory Score = 2, Maximal Prompting. Patient recognizes and  remembers 25-49% of the time. Patient requires maximal/a lot of prompting (most  of the time) for memory for the following: Inability to follow multi-step  commands.    Therapy Mode Minutes  Occupational Therapy: [facility]  Physical Therapy: [facility]  Speech Language Pathology:  [place]    Signed by: Rogers Erickson RN

## 2019-08-05 NOTE — PLAN OF CARE
"Problem: Patient Care Overview  Goal: Plan of Care Review  Outcome: Ongoing (interventions implemented as appropriate)   08/05/19 1302   OTHER   Outcome Summary Patient refused ST at 1230 by putting her feet down, refusing to allow wc to be moved and continuously verbalizing \"no\". When asked if she needed to use the bathroom or lay down, she stated \"yes\" but still refused to allow wc to be moved. Patient at nurses station with aides supervising. Will attempt ST at 1400.          "

## 2019-08-06 LAB
GLUCOSE BLDC GLUCOMTR-MCNC: 130 MG/DL (ref 70–130)
GLUCOSE BLDC GLUCOMTR-MCNC: 230 MG/DL (ref 70–130)
GLUCOSE BLDC GLUCOMTR-MCNC: 238 MG/DL (ref 70–130)
GLUCOSE BLDC GLUCOMTR-MCNC: 288 MG/DL (ref 70–130)
GLUCOSE BLDC GLUCOMTR-MCNC: 49 MG/DL (ref 70–130)

## 2019-08-06 PROCEDURE — 92507 TX SP LANG VOICE COMM INDIV: CPT

## 2019-08-06 PROCEDURE — 97535 SELF CARE MNGMENT TRAINING: CPT

## 2019-08-06 PROCEDURE — 97110 THERAPEUTIC EXERCISES: CPT

## 2019-08-06 PROCEDURE — 97112 NEUROMUSCULAR REEDUCATION: CPT

## 2019-08-06 PROCEDURE — 82962 GLUCOSE BLOOD TEST: CPT

## 2019-08-06 PROCEDURE — 92526 ORAL FUNCTION THERAPY: CPT

## 2019-08-06 PROCEDURE — 63710000001 INSULIN REGULAR HUMAN PER 5 UNITS: Performed by: PHYSICAL MEDICINE & REHABILITATION

## 2019-08-06 RX ORDER — LANSOPRAZOLE
30 KIT
Status: DISCONTINUED | OUTPATIENT
Start: 2019-08-06 | End: 2019-08-28

## 2019-08-06 RX ADMIN — AMANTADINE HYDROCHLORIDE 100 MG: 100 CAPSULE ORAL at 06:05

## 2019-08-06 RX ADMIN — PANTOPRAZOLE SODIUM 40 MG: 40 TABLET, DELAYED RELEASE ORAL at 06:05

## 2019-08-06 RX ADMIN — NYSTATIN 500000 UNITS: 100000 SUSPENSION ORAL at 21:58

## 2019-08-06 RX ADMIN — APIXABAN 5 MG: 5 TABLET, FILM COATED ORAL at 08:43

## 2019-08-06 RX ADMIN — BRIMONIDINE TARTRATE 1 DROP: 2 SOLUTION OPHTHALMIC at 21:58

## 2019-08-06 RX ADMIN — DORZOLAMIDE HYDROCHLORIDE 1 DROP: 20 SOLUTION/ DROPS OPHTHALMIC at 21:58

## 2019-08-06 RX ADMIN — NYSTATIN 500000 UNITS: 100000 SUSPENSION ORAL at 18:13

## 2019-08-06 RX ADMIN — ASPIRIN 325 MG: 325 TABLET ORAL at 08:44

## 2019-08-06 RX ADMIN — NEBIVOLOL HYDROCHLORIDE 20 MG: 10 TABLET ORAL at 21:58

## 2019-08-06 RX ADMIN — GLIPIZIDE 5 MG: 5 TABLET ORAL at 06:05

## 2019-08-06 RX ADMIN — DORZOLAMIDE HYDROCHLORIDE 1 DROP: 20 SOLUTION/ DROPS OPHTHALMIC at 08:44

## 2019-08-06 RX ADMIN — LOSARTAN POTASSIUM: 50 TABLET, FILM COATED ORAL at 08:43

## 2019-08-06 RX ADMIN — METFORMIN HYDROCHLORIDE 500 MG: 500 TABLET ORAL at 08:44

## 2019-08-06 RX ADMIN — NYSTATIN 500000 UNITS: 100000 SUSPENSION ORAL at 08:45

## 2019-08-06 RX ADMIN — ATORVASTATIN CALCIUM 80 MG: 80 TABLET, FILM COATED ORAL at 21:58

## 2019-08-06 RX ADMIN — NEBIVOLOL HYDROCHLORIDE 20 MG: 10 TABLET ORAL at 08:44

## 2019-08-06 RX ADMIN — METFORMIN HYDROCHLORIDE 500 MG: 500 TABLET ORAL at 18:13

## 2019-08-06 RX ADMIN — BRIMONIDINE TARTRATE 1 DROP: 2 SOLUTION OPHTHALMIC at 08:45

## 2019-08-06 RX ADMIN — INSULIN HUMAN 5 UNITS: 100 INJECTION, SOLUTION PARENTERAL at 08:41

## 2019-08-06 RX ADMIN — LANSOPRAZOLE 30 MG: KIT at 18:13

## 2019-08-06 RX ADMIN — APIXABAN 5 MG: 5 TABLET, FILM COATED ORAL at 21:58

## 2019-08-06 RX ADMIN — NYSTATIN 500000 UNITS: 100000 SUSPENSION ORAL at 13:22

## 2019-08-06 RX ADMIN — INSULIN HUMAN 8 UNITS: 100 INJECTION, SOLUTION PARENTERAL at 11:48

## 2019-08-06 RX ADMIN — GLIPIZIDE 5 MG: 5 TABLET ORAL at 18:13

## 2019-08-06 NOTE — PLAN OF CARE
Problem: Skin Injury Risk (Adult)  Goal: Skin Health and Integrity  Outcome: Ongoing (interventions implemented as appropriate)      Problem: Fall Risk (Adult)  Goal: Absence of Fall  Outcome: Ongoing (interventions implemented as appropriate)      Problem: Patient Care Overview  Goal: Plan of Care Review  Outcome: Ongoing (interventions implemented as appropriate)   08/05/19 1957 08/05/19 2102 08/06/19 0042   Patient Care Overview   IRF Plan of Care Review progress ongoing, continue --  --    Progress, Functional Goals demonstrating adequate progress --  --    Coping/Psychosocial   Plan of Care Reviewed With --  patient;spouse  ( here at shift change) --    OTHER   Outcome Summary --  --  Pt calm, cooperative at HS. Global aphasia, difficulty understanding her speech and writing on dry erase board. Meds crushed with applesauce. [place] boot RLE.     Goal: Discharge Needs Assessment  Outcome: Ongoing (interventions implemented as appropriate)    Goal: Coping Plan  Outcome: Ongoing (interventions implemented as appropriate)      Problem: Stroke (IRF) (Adult)  Goal: Promote Optimal Functional [place]  Outcome: Ongoing (interventions implemented as appropriate)

## 2019-08-06 NOTE — PROGRESS NOTES
Inpatient Rehabilitation Plan of Care Note    Plan of Care  Care Plan Reviewed - No updates at this time.    Psychosocial    Performed Intervention(s)  Assist patient to express needs and concerns      Safety    Performed Intervention(s)  Bed alarm, wc alarm  Safety rounds  Items within reach      Body Systems    Performed Intervention(s)  Daily skin inspection      Sphincter Control    Performed Intervention(s)  Monitor intake and output  Encourage fluid intake  Elimination schedule    Signed by: Anne Nathan RN

## 2019-08-06 NOTE — THERAPY TREATMENT NOTE
Inpatient Rehabilitation - Occupational Therapy Treatment Note    [facility]     Patient Name: Darby Workman  : 1945  MRN: [id number]    Today's Date: 2019                 Admit Date: 2019      Visit Dx:  No diagnosis found.    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)   • Right-sided headache   • Acute ischemic stroke (CMS/HCC)   • Embolic stroke (CMS/HCC)   • Cerebral infarction due to stenosis of left carotid artery (CMS/HCC)   • Anticoagulated by anticoagulation treatment   • Stroke (cerebrum) (CMS/HCC)         Therapy Treatment    IRF Treatment Summary     Row Name 19 1511 19 1230 19 1039       Evaluation/Treatment Time and Intent    Subjective Information  no complaints  -AF  no complaints  -KB  no complaints  -MD    Existing Precautions/Restrictions  fall  -AF  fall swallow, communication  -KB  fall  -MD    Document Type  therapy note (daily note)  -AF  therapy note (daily note)  -KB  therapy note (daily note)  -MD    Mode of Treatment  occupational therapy  -AF  speech-language pathology  -KB  physical therapy  -MD    Patient/Family Observations  sitting up in w/c at NSG station in AM, supine in bed in PM  -AF  patient seen at bedside; agreeable to PO trials of various consistencies for therapy  -KB  Pt seated in WC showing no signs of acute distress.  -MD    Start Time (Evaluation/Treatment)  --  1230  -KB  --    Stop Time (Evaluation/Treatment)  --  1300  -KB  --    Recorded by [AF] Ingris Ansari OTR [KB] Bruton, Katherine L [MD] Mira Chadwick, PT    Row Name 19 0930             Evaluation/Treatment Time and Intent    Subjective Information  no complaints  -KB      Existing Precautions/Restrictions  fall swallow, communication  -KB      Document Type  therapy note (daily note)  -KB       Mode of Treatment  speech-language pathology  -KB      Patient/Family Observations  seated in wc at nurses station; agreeable to therapy today  -KB      Start Time (Evaluation/Treatment)  0930  -KB      Stop Time (Evaluation/Treatment)  1000  -KB      Recorded by [KB] Bruton, Katherine L      Row Name 08/06/19 1511 08/06/19 1039          Cognition/Psychosocial- PT/OT    Affect/Mental Status (Cognitive)  unable/difficult to assess less anxious in PM session  -AF  --     Orientation Status (Cognition)  unable/difficult to assess  -AF  unable/difficult to assess  -MD     Follows Commands (Cognition)  follows one step commands;0-24% accuracy;25-49% accuracy with rote ADL tasks   -AF  follows one step commands;0-24% accuracy;increased processing time needed;physical/tactile prompts required;repetition of directions required;verbal cues/prompting required  -MD     Personal Safety Interventions  fall prevention program maintained;gait belt;nonskid shoes/slippers when out of bed  -AF  fall prevention program maintained;gait belt  -MD     Attention Deficit (Cognitive)  severe deficit  -AF  --     Memory Deficit (Cognitive)  unable/difficult to assess  -AF  --     Safety Deficit (Cognitive)  severe deficit;awareness of need for assistance;insight into deficits/self awareness  -AF  --     Recorded by [AF] Ingris Ansari OTR [MD] Mira Chadwick PT     Row Name 08/06/19 1511             Bed Mobility Assessment/Treatment    Supine-Sit [place] (Bed Mobility)  supervision;verbal cues  -AF      Sit-Supine [place] (Bed Mobility)  supervision;verbal cues  -AF      Recorded by [AF] Ingris Ansari OTR      Row Name 08/06/19 1039             Sit-Stand Transfer    Sit-Stand [place] (Transfers)  contact guard  -MD      Assistive Device (Sit-Stand Transfers)  walker, front-wheeleladia  -MD      Recorded by [MD] Mira Chadwick, PT      Row Name 08/06/19 1039             Stand-Sit Transfer    Stand-Sit [place] (Transfers)   contact guard;verbal cues  -MD      Assistive Device (Stand-Sit Transfers)  walker, front-wheeled  -MD      Recorded by [MD] Mira Chadwick, PT      Row Name 08/06/19 1511             Toilet Transfer    Type (Toilet Transfer)  stand pivot/stand step  -AF      [place] Level (Toilet Transfer)  contact guard;verbal cues  -AF      Assistive Device (Toilet Transfer)  commode;wheelchair;grab bars/safety frame  -AF      Recorded by [AF] Ingris Ansari OTR      Row Name 08/06/19 1511             Shower Transfer    Type (Shower Transfer)  stand pivot/stand step  -AF      [place] Level (Shower Transfer)  contact guard;verbal cues  -AF      Assistive Device (Shower Transfer)  grab bars/tub rail;wheelchair;tub bench  -AF      Recorded by [AF] Ingris Ansari OTR      Row Name 08/06/19 1039             Gait/Stairs Assessment/Training    [place] Level (Gait)  verbal cues;contact guard  -MD      Assistive Device (Gait)  other (see comments) HHA  -MD      Distance in Feet (Gait)  200x3  -MD      Pattern (Gait)  step-through  -MD      Deviations/Abnormal Patterns (Gait)  ataxic;gait speed decreased;stride length decreased;eliza decreased;base of support, narrow;festinating/shuffling  -MD      Bilateral Gait Deviations  heel strike decreased  -MD      [place] Level (Stairs)  minimum assist (75% patient effort);verbal cues  -MD      Handrail Location (Stairs)  left side (ascending)  -MD      Number of Steps (Stairs)  4  -MD      Ascending Technique (Stairs)  step-over-step  -MD      Descending Technique (Stairs)  step-to-step  -MD      Recorded by [MD] Mira Chadwick, PT      Row Name 08/06/19 1511             Basic Activities of Daily Living (BADLs)    Basic Activities of Daily Living  bathing;upper body dressing;lower body dressing;grooming;toileting  -AF      Recorded by [AF] Ingris Ansari OTR      Row Name 08/06/19 1511             Bathing Assessment/Treatment    Bathing [place] Level  bathing  skills;minimum assist (75% patient effort);verbal cues  -AF      Assistive Device (Bathing)  grab bar/tub rail;hand held shower spray hose;shower chair  -AF      Bathing Position  unsupported sitting;supported standing  -AF      Comment (Bathing)  with showering assist with throughness  -AF      Recorded by [AF] Ingris Ansari, OTR      Row Name 08/06/19 1511             Upper Body Dressing Assessment/Treatment    Upper Body Dressing Task  upper body dressing skills;minimum assist (75% or more patient effort);verbal cues  -AF      Upper Body Dressing Position  supported sitting  -AF      Comment (Upper Body Dressing)  assist to fasten bra strap, able to unfasten   -AF      Recorded by [AF] Ingris Ansari, OTR      Row Name 08/06/19 1511             Lower Body Dressing Assessment/Treatment    Lower Body Dressing [place] Level  doff;don;pants/bottoms;shoes/slippers;socks;minimum assist (75% patient effort);verbal cues  -AF      Lower Body Dressing Position  supported sitting;supported standing  -AF      Comment (Lower Body Dressing)  assist with button and zipper on pants  -AF      Recorded by [AF] Ingris Ansari, OTR      Row Name 08/06/19 1511             Grooming Assessment/Treatment    Grooming [place] Level  grooming skills;minimum assist (75% patient effort);verbal cues  -AF      Grooming Position  supported sitting  -AF      Comment (Grooming)  assist with throughness to wash hands in PM session, deferred washing her hair in AM   -AF      Recorded by [AF] Ingris Ansari, OTR      Row Name 08/06/19 1511             Toileting Assessment/Treatment    Toileting [place] Level  toileting skills;maximum assist (25% patient effort);verbal cues  -AF      Assistive Device Use (Toileting)  grab bar/safety frame;raised toilet seat  -AF      Toileting Position  unsupported sitting;supported standing  -AF      Comment (Toileting)  assist with throughness of hygiene and to fasten/unfasten patns    -AF      Recorded by [AF] Ingris Ansari OTR      Row Name 08/06/19 1511             Vision Assessment/Intervention    Vision Assessment Comment  pt participated in visual percpeutal puzzle of a fish with MOD vc's   -AF      Recorded by [AF] Ingris Ansari OTR      Row Name 08/06/19 1511 08/06/19 1230 08/06/19 1039       Pain Scale: Numbers Pre/Post-Treatment    Pain Scale: Numbers, Pretreatment  0/10 - no pain  -AF  0/10 - no pain  -KB  0/10 - no pain  -MD    Pain Scale: Numbers, Post-Treatment  0/10 - no pain  -AF  0/10 - no pain  -KB  --    Recorded by [AF] Ingris Ansari OTR [KB] Bruton, Katherine L [MD] Mira Chadwick, PT    Row Name 08/06/19 0930             Pain Scale: Numbers Pre/Post-Treatment    Pain Scale: Numbers, Pretreatment  0/10 - no pain  -KB      Recorded by [KB] Bruton, Katherine L      Row Name 08/06/19 1511             Static Sitting Balance    Level of [place] (Unsupported Sitting, Static Balance)  supervision  -AF      Recorded by [AF] Ingris Ansari OTKEVIN      Row Name 08/06/19 1511             Static Standing Balance    Level of [place] (Supported Standing, Static Balance)  contact guard assist  -AF      Comment (Supported Standing, Static Balance)  with ADL tasks   -AF      Recorded by [AF] Ingris Ansari OTR      Row Name 08/06/19 1039             Dynamic Balance Activity    Therapeutic Training Performed (Dynamic Balance)  side stepping  -MD      Support Needed for Balance (Dynamic Balance Training)  minimal external support for balance, 75% patient effort  -MD      Upper Extremity Activity with Device (Dynamic Balance Training)  other (see comments) holding onto hi-lo mat  -MD      Recorded by [MD] Mira Chadwick, PT      Row Name 08/06/19 1039             Lower Extremity Seated Therapeutic Exercise    Performed, Seated Lower Extremity (Therapeutic Exercise)  hip flexion/extension;ankle dorsiflexion/plantarflexion;knee flexion/extension  -MD      Exercise Type, Seated  Lower Extremity (Therapeutic Exercise)  AAROM (active assistive range of motion)  -MD      Sets/Reps Detail, Seated Lower Extremity (Therapeutic Exercise)  1/10  -MD      Comment, Seated Lower Extremity (Therapeutic Exercise)  max VC and demonstration required for pt understanding  -MD      Recorded by [MD] Mira Chadwick, PT      Row Name 08/06/19 1511             Neuromuscular Re-education    Comment (Neuromuscular Re-education)  attending to puzzle in PM session entire task until she completed  -AF      Recorded by [AF] Ingris Ansari OTR      Row Name 08/06/19 1511 08/06/19 1039          Positioning and Restraints    Pre-Treatment Position  sitting in chair/recliner  -AF  sitting in chair/recliner  -MD     Post Treatment Position  bed  -AF  wheelchair  -MD     In Bed  supine;notified nsg;call light within reach;encouraged to call for assist;exit alarm on in PM  -AF  --     In Wheelchair  sitting;patient within staff view;exit alarm on in AM session at NSG station  -AF  sitting;patient within staff view in DR  -MD     Recorded by [AF] Ingris Ansari, OTR [MD] Mira Chadwick, PT       User Key  (r) = Recorded By, (t) = Taken By, (c) = Cosigned By    Initials Name Effective Dates    AF Ingris Ansari OTR 04/03/18 -     Mira Vaca PT 04/03/18 -     KB Bruton, Katherine L 03/07/18 -           Wound 07/17/19 1015 Left neck incision (Active)   Dressing Appearance open to air 8/6/2019  8:43 AM   Closure Liquid skin adhesive 8/6/2019  8:43 AM   Base clean;dry 8/6/2019  8:43 AM   Drainage Amount none 8/5/2019  9:02 PM         OT Recommendation and Plan    Anticipated Equipment Needs At Discharge (OT Eval): bathing equipment  Planned Therapy Interventions (OT Eval): activity tolerance training, BADL retraining, cognitive/visual perception retraining, functional balance retraining, neuromuscular control/coordination retraining, occupation/activity based interventions, patient/caregiver education/training,  ROM/therapeutic exercise, strengthening exercise, transfer/mobility retraining            OT IRF GOALS     Row Name 08/01/19 1159             Transfer Goal 1 (OT-IRF)    Activity/Assistive Device (Transfer Goal 1, OT-IRF)  toilet;shower chair;walk-in shower  -AF      [place] Level (Transfer Goal 1, OT-IRF)  verbal cues required;minimum assist (75% or more patient effort);contact guard assist  -AF      Time Frame (Transfer Goal 1, OT-IRF)  short term goal (STG)  -AF      Progress/Outcomes (Transfer Goal 1, OT-IRF)  goal ongoing  -AF         Transfer Goal 2 (OT-IRF)    Activity/Assistive Device (Transfer Goal 2, OT-IRF)  shower chair;walk-in shower;toilet  -AF      [place] Level (Transfer Goal 2, OT-IRF)  contact guard assist;verbal cues required  -AF      Time Frame (Transfer Goal 2, OT-IRF)  long term goal (LTG)  -AF      Progress/Outcomes (Transfer Goal 2, OT-IRF)  goal ongoing  -AF         Bathing Goal 1 (OT-IRF)    Activity/Device (Bathing Goal 1, OT-IRF)  bathing skills, all;grab bar/tub rail;hand-held shower spray hose;shower chair  -AF      [place] Level (Bathing Goal 1, OT-IRF)  minimum assist (75% or more patient effort);verbal cues required  -AF      Time Frame (Bathing Goal 1, OT-IRF)  short term goal (STG)  -AF      Progress/Outcomes (Bathing Goal 1, OT-IRF)  goal ongoing  -AF         Bathing Goal 2 (OT-IRF)    Activity/Device (Bathing Goal 2, OT-IRF)  bathing skills, all;grab bar/tub rail;hand-held shower spray hose;shower chair  -AF      [place] Level (Bathing Goal 2, OT-IRF)  contact guard assist;verbal cues required  -AF      Time Frame (Bathing Goal 2, OT-IRF)  long term goal (LTG)  -AF      Progress/Outcomes (Bathing Goal 2, OT-IRF)  goal ongoing  -AF         UB Dressing Goal 1 (OT-IRF)    Activity/Device (UB Dressing Goal 1, OT-IRF)  upper body dressing  -AF      [place] (UB Dress Goal 1, OT-IRF)  set-up required  -AF      Time Frame (UB Dressing Goal 1, OT-IRF)  long term  goal (LTG)  -AF      Progress/Outcomes (UB Dressing Goal 1, OT-IRF)  goal ongoing  -AF         LB Dressing Goal 1 (OT-IRF)    Activity/Device (LB Dressing Goal 1, OT-IRF)  lower body dressing  -AF      [place] (LB Dressing Goal 1, OT-IRF)  moderate assist (50-74% patient effort);verbal cues required  -AF      Time Frame (LB Dressing Goal 1, OT-IRF)  short term goal (STG)  -AF      Progress/Outcomes (LB Dressing Goal 1, OT-IRF)  goal ongoing  -AF         LB Dressing Goal 2 (OT-IRF)    Activity/Device (LB Dressing Goal 2, OT-IRF)  lower body dressing  -AF      [place] (LB Dressing Goal 2, OT-IRF)  contact guard assist;verbal cues required  -AF      Time Frame (LB Dressing Goal 2, OT-IRF)  long term goal (LTG)  -AF      Progress/Outcomes (LB Dressing Goal 2, OT-IRF)  goal ongoing  -AF         Grooming Goal 1 (OT-IRF)    Activity/Device (Grooming Goal 1, OT-IRF)  grooming skills, all  -AF      [place] (Grooming Goal 1, OT-IRF)  minimum assist (75% or more patient effort);verbal cues required  -AF      Time Frame (Grooming Goal 1, OT-IRF)  short term goal (STG)  -AF      Progress/Outcomes (Grooming Goal 1, OT-IRF)  goal ongoing  -AF         Grooming Goal 2 (OT-IRF)    Activity/Device (Grooming Goal 2, OT-IRF)  grooming skills, all  -AF      [place] (Grooming Goal 2, OT-IRF)  supervision required;verbal cues required  -AF      Time Frame (Grooming Goal 2, OT-IRF)  long term goal (LTG)  -AF      Progress/Outcomes (Grooming Goal 2, OT-IRF)  goal ongoing  -AF         Toileting Goal 1 (OT-IRF)    Activity/Device (Toileting Goal 1, OT-IRF)  toileting skills, all;grab bar/safety frame;raised toilet seat  -AF      [place] Level (Toileting Goal 1, OT-IRF)  moderate assist (50-74% patient effort);verbal cues required  -AF      Time Frame (Toileting Goal 1, OT-IRF)  short term goal (STG)  -AF      Progress/Outcomes (Toileting Goal 1, OT-IRF)  goal ongoing  -AF         Toileting Goal 2 (OT-IRF)     Activity/Device (Toileting Goal 2, OT-IRF)  toileting skills, all;grab bar/safety frame;raised toilet seat  -AF      [place] Level (Toileting Goal 2, OT-IRF)  verbal cues required;contact guard assist  -AF      Time Frame (Toileting Goal 2, OT-IRF)  long term goal (LTG)  -AF      Progress/Outcomes (Toileting Goal 2, OT-IRF)  goal ongoing  -AF         Caregiver Training Goal 1 (OT-IRF)    Caregiver Training Goal 1 (OT-IRF)  Family and patient to demo safe technique with ADLs, transfers, HEP and adaptive equipment as needed   -AF      Time Frame (Caregiver Training Goal 1, OT-IRF)  long term goal (LTG)  -AF      Progress/Outcomes (Caregiver Training Goal 1, OT-IRF)  goal ongoing  -AF        User Key  (r) = Recorded By, (t) = Taken By, (c) = Cosigned By    Initials Name Provider Type    AF Ingris Ansari OTR Occupational Therapist                     Time Calculation:     Time Calculation- OT     Row Name 08/06/19 1520 08/06/19 1519          Time Calculation- OT    OT Start Time  1430  -AF  0830  -AF     OT Stop Time  1500  -AF  0900  -AF     OT Time Calculation (min)  30 min  -AF  30 min  -AF       User Key  (r) = Recorded By, (t) = Taken By, (c) = Cosigned By    Initials Name Provider Type    Ingris Youssef OTR Occupational Therapist          Therapy Charges for Today     Code Description Service Date Service Provider Modifiers Qty    33048545073 HC OT SELF CARE/MGMT/TRAIN EA 15 MIN 8/5/2019 Ingris Ansari OTR GO 3    33277627751 HC OT NEUROMUSC RE EDUCATION EA 15 MIN 8/5/2019 Ingris Ansari OTR GO 1    78458135298 HC OT SELF CARE/MGMT/TRAIN EA 15 MIN 8/6/2019 Ingris Ansari OTR GO 3    38248624848 HC OT NEUROMUSC RE EDUCATION EA 15 MIN 8/6/2019 Ingris Ansari OTR GO 1                   JOHN Mejia  8/6/2019

## 2019-08-06 NOTE — PLAN OF CARE
Problem: Skin Injury Risk (Adult)  Goal: Skin Health and Integrity  Outcome: Ongoing (interventions implemented as appropriate)      Problem: Fall Risk (Adult)  Goal: Absence of Fall  Outcome: Ongoing (interventions implemented as appropriate)      Problem: Patient Care Overview  Goal: Plan of Care Review  Outcome: Ongoing (interventions implemented as appropriate)   08/06/19 6603   Patient Care Overview   IRF Plan of Care Review progress ongoing, continue   Progress, Functional Goals demonstrating adequate progress   Coping/Psychosocial   Plan of Care Reviewed With patient   OTHER   Outcome Summary Pt cooperative. Global aphasia. Unable to understand pt speech. Extra time taken to listen and talk with pt. Sometimes pt is able to understand requests or commands AEB pt response. No reported or observed pain. [place] boot. Blood sugars ac and hs with sliding scale. High blood sugar at lunch of 288 and low blood sugar at dinner of 46. Meds crushed in applesauce.       Problem: Stroke (IRF) (Adult)  Goal: Promote Optimal Functional [place]  Outcome: Ongoing (interventions implemented as appropriate)

## 2019-08-06 NOTE — THERAPY TREATMENT NOTE
Inpatient Rehabilitation - Speech Language Pathology Treatment Note    [facility]       Patient Name: Darby Workman  : 1945  MRN: [id number]  Today's Date: 2019      Admit Date: 2019  Visit Dx:    No diagnosis found.    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)   • Right-sided headache   • Acute ischemic stroke (CMS/HCC)   • Embolic stroke (CMS/HCC)   • Cerebral infarction due to stenosis of left carotid artery (CMS/HCC)   • Anticoagulated by anticoagulation treatment   • Stroke (cerebrum) (CMS/HCC)     Therapy Treatment  Evaluation/Coping  Evaluation/Treatment Time and Intent  Subjective Information: no complaints (19 1230 : Bruton, Katherine L)  Existing Precautions/Restrictions: fall(swallow, communication) (19 1230 : Bruton, Katherine L)  Document Type: therapy note (daily note) (19 1230 : Bruton, Katherine L)  Mode of Treatment: speech-language pathology (19 1230 : Bruton, Katherine L)  Patient/Family Observations: patient seen at bedside; agreeable to PO trials of various consistencies for therapy (19 1230 : Bruton, Katherine L)  Start Time (Evaluation/Treatment): 1230 (19 1230 : Bruton, Katherine L)  Stop Time (Evaluation/Treatment): 1300 (19 1230 : Bruton, Katherine L)    Vitals/Pain/Safety  Pain Scale: Numbers Pre/Post-Treatment  Pain Scale: Numbers, Pretreatment: 0/10 - no pain (19 1230 : Bruton, Katherine L)  Pain Scale: Numbers, Post-Treatment: 0/10 - no pain (19 1230 : Bruton, Katherine L)    EDUCATION  The patient has been educated in the following areas:   Communication Impairment.    SLP Recommendation and Plan  SLP Diagnosis: severe global aphasia, cognitive impairment  SLP Diagnosis: severe global aphasia, cognitive impairment  Rehab  Potential/Prognosis: good  Anticipated Dischage Disposition: home with assist, anticipate therapy at next level of care  Predicted Duration Therapy Intervention (Days): until discharge  Plan of Care Reviewed With: patient    SLP GOALS     Row Name 08/06/19 1230 08/06/19 0930 08/05/19 1400       Oral Nutrition/Hydration Goal 2 (SLP)    Oral Nutrition/Hydration Goal 2, SLP  Pt will tolerate honey and puree without overt s/s of aspiration, pocketing, or anterior loss across three meal observations.  -KB  --  --    Time Frame (Oral Nutrition/Hydration Goal 2, SLP)  by discharge  -KB  --  --    Barriers (Oral Nutrition/Hydration Goal 2, SLP)  Patient is consistently tolerating puree/HTL for meals at this time. Trials of NTL, TL, puree, mech soft, mixed consistency, and regular solids were tested this date. Right facial weakness persists, which resulted in mild oal residue in the right buccal cavity, which patient was able to clear with cues lingual sweep and liquid wash. Slow but adequate mastication observed with regular solid, mild lingual residue cleared with liquid wash. No overt s/s pen/asp with any consistencies tested, including mixed consistency, NTL via cup/straw, and TL via cup/straw. Plan to observe patient with test tray of mech soft, TL next date.   -KB  --  --    Progress/Outcomes (Oral Nutrition/Hydration Goal 2, SLP)  good progress toward goal;goal ongoing  -KB  --  --       Words/Phrases/Sentences Goal 1 (SLP)    Progress (Ability to Contruct Words/Phrases/Sentences Goal 1, SLP)  --  with maximum cues (25-49%)  -KB  with maximum cues (25-49%)  -KB    Progress/Outcomes (Identify Objects and Pictures Goal 1, SLP)  --  goal ongoing  -KB  goal ongoing  -KB    Comment (Words/Phrases/Sentences Goal 1, SLP)  --  30% identifying body parts by name, 100% with direct physical model  -KB  30% identifying named body parts, 90% with direct physical model  -KB       Reading Comprehension of Basic Signs and Letters  Goal 1 (SLP)    Progress (Reading Comprehension of Basic Signs and Letters Goal 1, SLP)  --  with maximum cues (25-49%)  -KB  with moderate cues (50-74%)  -KB    Progress/Outcomes (Reading Comprehension of Basic Signs and Letters Goal 1, SLP)  --  goal ongoing  -KB  goal ongoing  -KB    Comment (Reading Comprehension of Basic Signs and Letters Goal 1, SLP)  --  30% identifying named letter in fo2  -KB  50% identifying named letter in fo2  -KB       Word Retrieval Skills Goal 1 (SLP)    Progress (Word Retrieval Skills Goal 1, SLP)  --  with maximum cues (25-49%);with 1:1 supervision/constant cues  -KB  --    Progress/Outcomes (Word Retrieval Goal 1, SLP)  --  goal ongoing  -KB  --    Comment (Word Retrieval Goal 1, SLP)  --  20%, 30%, 20% counting 1-10 with verbal model, visual and tactile cues  -KB  --       Graphic Expression of Shapes, Letters, Numbers Goal 1 (SLP)    Progress (Graphic Expression of Shapes, Letters, and Numbers Goal 1, SLP)  --  --  with moderate cues (50-74%);with maximum cues (25-49%)  -KB    Progress/Outcomes (Graphic Expression of Shapes, Letters, and Numbers Goal 1, SLP)  --  --  goal ongoing  -KB    Comment (Graphic Expression of Shapes, Letters, and Numbers Goal 1, SLP)  --  --  60% accuracy arranging letter manipulatives to spell her name given written model; 40% accuracy copying her written name given written model, [place] assist and written letters to trace required to complete her name  -KB       Planning and Execution of Connected Speech Goal 1 (SLP)    Progress (Planning and Execution of Connected Speech Goal 1, SLP)  --  with moderate cues (50-74%)  -KB  --    Progress/Outcomes (Planning and Execution of Connected Speech Goal 1, SLP)  --  goal ongoing  -KB  --    Comment (Planning and Execution of Connected Speech Goal 1, SLP)  --  50% imitating functional CV syllable words with mod cues  -KB  --       Augmentative/Alternative Communication Objectives Goal 1 (SLP    Progress  (Augmentative/Alternative Communication Goal 1, SLP)  --  with 1:1 supervision/constant cues  -KB  --    Progress/Outcomes (Augmentative/Alternative Communication Goal 1, SLP)  --  goal ongoing  -KB  --    Comment (Augmentative/Alternative Communication Goal 1, SLP)  --  Provided [place] assist and verbal models to introduce 6 requests on basic communication picture board, minimal comprehension at this time  -KB  --       Additional Goal 1 (SLP)    Barriers (Additional Goal 1, SLP)  --  100% sequencing visual numbers 1-10  -KB  --    Progress/Outcomes (Additional Goal 1, SLP)  --  goal ongoing  -KB  --    Row Name 08/05/19 0930             Reading Comprehension of Basic Signs and Letters Goal 1 (SLP)    Reading Comprehension of Basic Signs and Letters Goal 1 (SLP)  match printed letter to picture;match printed letter to spoken letter;90%;independently (over 90% accuracy)  -KB      Time Frame (Reading Comprehension of Basic Signs and Letters Goal 1, SLP)  by discharge  -KB      Barriers (Reading Comprehension of Basic Signs and Letters Goal 1, SLP)  new goal  -KB      Progress (Reading Comprehension of Basic Signs and Letters Goal 1, SLP)  with moderate cues (50-74%)  -KB      Progress/Outcomes (Reading Comprehension of Basic Signs and Letters Goal 1, SLP)  goal ongoing  -KB      Comment (Reading Comprehension of Basic Signs and Letters Goal 1, SLP)  60% identifying named letter in fo2  -KB         Word Retrieval Skills Goal 1 (SLP)    Progress (Word Retrieval Skills Goal 1, SLP)  with maximum cues (25-49%)  -KB      Progress/Outcomes (Word Retrieval Goal 1, SLP)  goal ongoing  -KB      Comment (Word Retrieval Goal 1, SLP)  30%, 50%, 30% couting 1-10 with direct verbal model across 3 trials respectively  -KB         Word Retrieval Skills Goal 2 (SLP)    Progress/Outcomes (Word Retrieval Goal 2, SLP)  goal ongoing  -KB      Comment (Word Retrieval Goal 2, SLP)  increased accuracy with some functional phrases, but not able  "to complete an entire thought (i.e. \"I just don't know what...\", \"There's so much...\")  -KB         Additional Goal 1 (SLP)    Additional Goal 1, SLP  Complete diagnostic treatment tasks for cognition.   -KB      Time Frame (Additional Goal 1, SLP)  by discharge  -KB      Barriers (Additional Goal 1, SLP)  100% sequencing visual numbers 1-10; 71% matching like shapes from one side of the page to the other  -KB      Progress/Outcomes (Additional Goal 1, SLP)  goal ongoing  -KB        User Key  (r) = Recorded By, (t) = Taken By, (c) = Cosigned By    Initials Name Provider Type    KB Bruton, Katherine L Speech and Language Pathologist        Time Calculation:   Time Calculation- SLP     Row Name 19 1255 19 1000          Time Calculation- SLP    SLP Start Time  1230  -KB  0930  -KB     SLP Stop Time  1300  -KB  1000  -KB     SLP Time Calculation (min)  30 min  -KB  30 min  -KB     SLP Non-Billable Time (min)  --  10 min rounds  -KB     SLP Received On  --  19  -KB       User Key  (r) = Recorded By, (t) = Taken By, (c) = Cosigned By    Initials Name Provider Type    KB Bruton, Katherine L Speech and Language Pathologist        Therapy Charges for Today     Code Description Service Date Service Provider Modifiers Qty    86492976519 HC ST TREATMENT SPEECH 4 2019 Bruton, Katherine L GN 1    [id number] HC ST TREATMENT SWALLOW 2 2019 Bruton, Katherine L GN 1    [id number] HC ST TREATMENT SPEECH 2 2019 Bruton, Katherine L GN 1        Katherine L Bruton  2019     and Inpatient Rehabilitation - [facility] Speech Language Pathology   Swallow Treatment Note/Discharge   [facility]     Patient Name: Darby Workman  : 1945  MRN: [id number]  Today's Date: 2019     Admit Date: 2019  Visit Dx:    No diagnosis found.  Patient Active Problem List   Diagnosis   • Hypertensive crisis   • Diabetes mellitus (CMS/HCC)   • Hyperlipidemia   • Hypertension   • Elevated troponin   • Acute " cerebrovascular accident (CVA) of cerebellum (CMS/HCC)   • Right-sided headache   • Acute ischemic stroke (CMS/HCC)   • Embolic stroke (CMS/HCC)   • Cerebral infarction due to stenosis of left carotid artery (CMS/HCC)   • Anticoagulated by anticoagulation treatment   • Stroke (cerebrum) (CMS/HCC)     Therapy Treatment  Evaluation/Coping  Evaluation/Treatment Time and Intent  Subjective Information: no complaints (08/06/19 1230 : Bruton, Katherine L)  Existing Precautions/Restrictions: fall(swallow, communication) (08/06/19 1230 : Bruton, Katherine L)  Document Type: therapy note (daily note) (08/06/19 1230 : Bruton, Katherine L)  Mode of Treatment: speech-language pathology (08/06/19 1230 : Bruton, Katherine L)  Patient/Family Observations: patient seen at bedside; agreeable to PO trials of various consistencies for therapy (08/06/19 1230 : Bruton, Katherine L)  Start Time (Evaluation/Treatment): 1230 (08/06/19 1230 : Bruton, Katherine L)  Stop Time (Evaluation/Treatment): 1300 (08/06/19 1230 : Bruton, Katherine L)    Vitals/Pain/Safety  Pain Scale: Numbers Pre/Post-Treatment  Pain Scale: Numbers, Pretreatment: 0/10 - no pain (08/06/19 1230 : Bruton, Katherine L)  Pain Scale: Numbers, Post-Treatment: 0/10 - no pain (08/06/19 1230 : Bruton, Katherine L)    SLP GOALS     Row Name 08/06/19 1230 08/06/19 0930 08/05/19 1400       Oral Nutrition/Hydration Goal 2 (SLP)    Oral Nutrition/Hydration Goal 2, SLP  Pt will tolerate honey and puree without overt s/s of aspiration, pocketing, or anterior loss across three meal observations.  -KB  --  --    Time Frame (Oral Nutrition/Hydration Goal 2, SLP)  by discharge  -KB  --  --    Barriers (Oral Nutrition/Hydration Goal 2, SLP)  Patient is consistently tolerating puree/HTL for meals at this time. Trials of NTL, TL, puree, mech soft, mixed consistency, and regular solids were tested this date. Right facial weakness persists, which resulted in mild oal residue in the right  buccal cavity, which patient was able to clear with cues lingual sweep and liquid wash. Slow but adequate mastication observed with regular solid, mild lingual residue cleared with liquid wash. No overt s/s pen/asp with any consistencies tested, including mixed consistency, NTL via cup/straw, and TL via cup/straw. Plan to observe patient with test tray of [facility] soft, TL next date.   -KB  --  --    Progress/Outcomes (Oral Nutrition/Hydration Goal 2, SLP)  good progress toward goal;goal ongoing  -KB  --  --       Words/Phrases/Sentences Goal 1 (SLP)    Progress (Ability to Contruct Words/Phrases/Sentences Goal 1, SLP)  --  with maximum cues (25-49%)  -KB  with maximum cues (25-49%)  -KB    Progress/Outcomes (Identify Objects and Pictures Goal 1, SLP)  --  goal ongoing  -KB  goal ongoing  -KB    Comment (Words/Phrases/Sentences Goal 1, SLP)  --  30% identifying body parts by name, 100% with direct physical model  -KB  30% identifying named body parts, 90% with direct physical model  -KB       Reading Comprehension of Basic Signs and Letters Goal 1 (SLP)    Progress (Reading Comprehension of Basic Signs and Letters Goal 1, SLP)  --  with maximum cues (25-49%)  -KB  with moderate cues (50-74%)  -KB    Progress/Outcomes (Reading Comprehension of Basic Signs and Letters Goal 1, SLP)  --  goal ongoing  -KB  goal ongoing  -KB    Comment (Reading Comprehension of Basic Signs and Letters Goal 1, SLP)  --  30% identifying named letter in fo2  -KB  50% identifying named letter in fo2  -KB       Word Retrieval Skills Goal 1 (SLP)    Progress (Word Retrieval Skills Goal 1, SLP)  --  with maximum cues (25-49%);with 1:1 supervision/constant cues  -KB  --    Progress/Outcomes (Word Retrieval Goal 1, SLP)  --  goal ongoing  -KB  --    Comment (Word Retrieval Goal 1, SLP)  --  20%, 30%, 20% counting 1-10 with verbal model, visual and tactile cues  -KB  --       Graphic Expression of Shapes, Letters, Numbers Goal 1 (SLP)    Progress  (Graphic Expression of Shapes, Letters, and Numbers Goal 1, SLP)  --  --  with moderate cues (50-74%);with maximum cues (25-49%)  -KB    Progress/Outcomes (Graphic Expression of Shapes, Letters, and Numbers Goal 1, SLP)  --  --  goal ongoing  -KB    Comment (Graphic Expression of Shapes, Letters, and Numbers Goal 1, SLP)  --  --  60% accuracy arranging letter manipulatives to spell her name given written model; 40% accuracy copying her written name given written model, [place] assist and written letters to trace required to complete her name  -KB       Planning and Execution of Connected Speech Goal 1 (SLP)    Progress (Planning and Execution of Connected Speech Goal 1, SLP)  --  with moderate cues (50-74%)  -KB  --    Progress/Outcomes (Planning and Execution of Connected Speech Goal 1, SLP)  --  goal ongoing  -KB  --    Comment (Planning and Execution of Connected Speech Goal 1, SLP)  --  50% imitating functional CV syllable words with mod cues  -KB  --       Augmentative/Alternative Communication Objectives Goal 1 (SLP    Progress (Augmentative/Alternative Communication Goal 1, SLP)  --  with 1:1 supervision/constant cues  -KB  --    Progress/Outcomes (Augmentative/Alternative Communication Goal 1, SLP)  --  goal ongoing  -KB  --    Comment (Augmentative/Alternative Communication Goal 1, SLP)  --  Provided [place] assist and verbal models to introduce 6 requests on basic communication picture board, minimal comprehension at this time  -KB  --       Additional Goal 1 (SLP)    Barriers (Additional Goal 1, SLP)  --  100% sequencing visual numbers 1-10  -KB  --    Progress/Outcomes (Additional Goal 1, SLP)  --  goal ongoing  -KB  --    Row Name 08/05/19 0930             Reading Comprehension of Basic Signs and Letters Goal 1 (SLP)    Reading Comprehension of Basic Signs and Letters Goal 1 (SLP)  match printed letter to picture;match printed letter to spoken letter;90%;independently (over 90% accuracy)  -KB      Time Frame  "(Reading Comprehension of Basic Signs and Letters Goal 1, SLP)  by discharge  -KB      Barriers (Reading Comprehension of Basic Signs and Letters Goal 1, SLP)  new goal  -KB      Progress (Reading Comprehension of Basic Signs and Letters Goal 1, SLP)  with moderate cues (50-74%)  -KB      Progress/Outcomes (Reading Comprehension of Basic Signs and Letters Goal 1, SLP)  goal ongoing  -KB      Comment (Reading Comprehension of Basic Signs and Letters Goal 1, SLP)  60% identifying named letter in fo2  -KB         Word Retrieval Skills Goal 1 (SLP)    Progress (Word Retrieval Skills Goal 1, SLP)  with maximum cues (25-49%)  -KB      Progress/Outcomes (Word Retrieval Goal 1, SLP)  goal ongoing  -KB      Comment (Word Retrieval Goal 1, SLP)  30%, 50%, 30% couting 1-10 with direct verbal model across 3 trials respectively  -KB         Word Retrieval Skills Goal 2 (SLP)    Progress/Outcomes (Word Retrieval Goal 2, SLP)  goal ongoing  -KB      Comment (Word Retrieval Goal 2, SLP)  increased accuracy with some functional phrases, but not able to complete an entire thought (i.e. \"I just don't know what...\", \"There's so much...\")  -KB         Additional Goal 1 (SLP)    Additional Goal 1, SLP  Complete diagnostic treatment tasks for cognition.   -KB      Time Frame (Additional Goal 1, SLP)  by discharge  -KB      Barriers (Additional Goal 1, SLP)  100% sequencing visual numbers 1-10; 71% matching like shapes from one side of the page to the other  -KB      Progress/Outcomes (Additional Goal 1, SLP)  goal ongoing  -KB        User Key  (r) = Recorded By, (t) = Taken By, (c) = Cosigned By    Initials Name Provider Type    KB Bruton, Katherine L Speech and Language Pathologist        EDUCATION  The patient has been educated in the following areas:   Dysphagia (Swallowing Impairment) Oral Care/Hydration Modified Diet Instruction.    Time Calculation:   Time Calculation- SLP     Row Name 08/06/19 1255 08/06/19 1000          Time " Calculation- SLP    SLP Start Time  1230  -KB  0930  -KB     SLP Stop Time  1300  -KB  1000  -KB     SLP Time Calculation (min)  30 min  -KB  30 min  -KB     SLP Non-Billable Time (min)  --  10 min rounds  -KB     SLP Received On  --  08/06/19  -KB       User Key  (r) = Recorded By, (t) = Taken By, (c) = Cosigned By    Initials Name Provider Type    KB Bruton, Katherine L Speech and Language Pathologist        Therapy Charges for Today     Code Description Service Date Service Provider Modifiers Qty    41333339668 HC ST TREATMENT SPEECH 4 8/5/2019 Bruton, Katherine L GN 1    [id number] HC ST TREATMENT SWALLOW 2 8/6/2019 Bruton, Katherine L GN 1    [id number] HC ST TREATMENT SPEECH 2 8/6/2019 Bruton, Katherine L GN 1        SLP Discharge Summary  Anticipated Dischage Disposition: home with assist, anticipate therapy at next level of care    Katherine L Bruton  8/6/2019

## 2019-08-06 NOTE — PROGRESS NOTES
Inpatient Rehabilitation Functional Measures Assessment    Functional Measures  KATI Eating:  [facility] Grooming: [facility] Bathing:  [facility] Upper Body Dressing:  [facility] Lower Body Dressing:  [facility] Toileting:  [facility] Bladder Management  Level of Assistance:  [place]  Frequency/Number of Accidents this Shift:  [facility] Bowel Management  Level of Assistance: [place]  Frequency/Number of Accidents this Shift: [facility] Bed/Chair/Wheelchair Transfer:  Activity was not observed.  KATI Toilet Transfer:  [facility] Tub/Shower Transfer:  [place]    Previously Documented Mode of Locomotion at Discharge: Field  KATI Expected Mode of Locomotion at Discharge: [facility] Walk/Wheelchair:  WHEELCHAIR OBSERVATION   Activity was not observed.    WALK OBSERVATION   Walk Distance Scale = 3.  Distance walked is greater than 150 feet. Walk Score  = 4.  Patient performs 75% or more of effort and requires minimal assistance.  Incidental help/contact guard/steadying was provided. Patient walked a distance  of  200 feet. No assistive devices were required.  KATI Stairs:  Stairs Score = 2.  Incidental assistance with lifting or lowering,  contact guard or steadying was provided. Patient performs 75% or more of effort  and requires minimal contact assistance. Patient negotiated  4 stairs. Patient  requires the following assistive device(s): Handrail(s).    KATI Comprehension:  [place]  KATI Expression:  [facility] Social Interaction:  [facility] Problem Solving:  [facility] Memory:  [place]    Therapy Mode Minutes  Occupational Therapy: [place]  Physical Therapy: Individual: 60 minutes.  Speech Language Pathology:  [place]    Signed by: Mira Chadwick, PT

## 2019-08-06 NOTE — THERAPY TREATMENT NOTE
Inpatient Rehabilitation - Physical Therapy Treatment Note  [facility]     Patient Name: Darby Workman  : 1945  MRN: [id number]    Today's Date: 2019                 Admit Date: 2019      Visit Dx:    No diagnosis found.    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)   • Right-sided headache   • Acute ischemic stroke (CMS/HCC)   • Embolic stroke (CMS/HCC)   • Cerebral infarction due to stenosis of left carotid artery (CMS/HCC)   • Anticoagulated by anticoagulation treatment   • Stroke (cerebrum) (CMS/HCC)       Therapy Treatment    IRF Treatment Summary     Row Name 19 1230 19 1039 19 0930       Evaluation/Treatment Time and Intent    Subjective Information  no complaints  -KB  no complaints  -MD  no complaints  -KB    Existing Precautions/Restrictions  fall swallow, communication  -KB  fall  -MD  fall swallow, communication  -KB    Document Type  therapy note (daily note)  -KB  therapy note (daily note)  -MD  therapy note (daily note)  -KB    Mode of Treatment  speech-language pathology  -KB  physical therapy  -MD  speech-language pathology  -KB    Patient/Family Observations  patient seen at bedside; agreeable to PO trials of various consistencies for therapy  -KB  Pt seated in WC showing no signs of acute distress.  -MD  seated in wc at nurses station; agreeable to therapy today  -KB    Start Time (Evaluation/Treatment)  1230  -KB  --  0930  -KB    Stop Time (Evaluation/Treatment)  1300  -KB  --  1000  -KB    Recorded by [KB] Bruton, Katherine L [MD] Mira Chadwick, PT [KB] Bruton, Katherine L    Row Name 19 1039             Cognition/Psychosocial- PT/OT    Orientation Status (Cognition)  unable/difficult to assess  -MD      Follows Commands (Cognition)  follows one step commands;0-24%  accuracy;increased processing time needed;physical/tactile prompts required;repetition of directions required;verbal cues/prompting required  -MD      Personal Safety Interventions  fall prevention program maintained;gait belt  -MD      Recorded by [MD] Mira Chadwick, PT      Row Name 08/06/19 1039             Sit-Stand Transfer    Sit-Stand [place] (Transfers)  contact guard  -MD      Assistive Device (Sit-Stand Transfers)  walker, front-wheeled  -MD      Recorded by [MD] Mira Chadwick, PT      Row Name 08/06/19 1039             Stand-Sit Transfer    Stand-Sit [place] (Transfers)  contact guard;verbal cues  -MD      Assistive Device (Stand-Sit Transfers)  walker, front-wheeled  -MD      Recorded by [MD] Mira Chadwick, PT      Row Name 08/06/19 1039             Gait/Stairs Assessment/Training    [place] Level (Gait)  verbal cues;contact guard  -MD      Assistive Device (Gait)  other (see comments) HHA  -MD      Distance in Feet (Gait)  200x3  -MD      Pattern (Gait)  step-through  -MD      Deviations/Abnormal Patterns (Gait)  ataxic;gait speed decreased;stride length decreased;eliza decreased;base of support, narrow;festinating/shuffling  -MD      Bilateral Gait Deviations  heel strike decreased  -MD      [place] Level (Stairs)  minimum assist (75% patient effort);verbal cues  -MD      Handrail Location (Stairs)  left side (ascending)  -MD      Number of Steps (Stairs)  4  -MD      Ascending Technique (Stairs)  step-over-step  -MD      Descending Technique (Stairs)  step-to-step  -MD      Recorded by [MD] Mira Chadwick, PT      Row Name 08/06/19 1230 08/06/19 1039 08/06/19 0930       Pain Scale: Numbers Pre/Post-Treatment    Pain Scale: Numbers, Pretreatment  0/10 - no pain  -KB  0/10 - no pain  -MD  0/10 - no pain  -KB    Pain Scale: Numbers, Post-Treatment  0/10 - no pain  -KB  --  --    Recorded by [KB] Bruton, Katherine L [MD] Mira Chadwick, PT [KB] Bruton, Katherine L    Row Name 08/06/19 1039              Dynamic Balance Activity    Therapeutic Training Performed (Dynamic Balance)  side stepping  -MD      Support Needed for Balance (Dynamic Balance Training)  minimal external support for balance, 75% patient effort  -MD      Upper Extremity Activity with Device (Dynamic Balance Training)  other (see comments) holding onto hi-lo mat  -MD      Recorded by [MD] Mira Chadwick PT      Row Name 08/06/19 1039             Lower Extremity Seated Therapeutic Exercise    Performed, Seated Lower Extremity (Therapeutic Exercise)  hip flexion/extension;ankle dorsiflexion/plantarflexion;knee flexion/extension  -MD      Exercise Type, Seated Lower Extremity (Therapeutic Exercise)  AAROM (active assistive range of motion)  -MD      Sets/Reps Detail, Seated Lower Extremity (Therapeutic Exercise)  1/10  -MD      Comment, Seated Lower Extremity (Therapeutic Exercise)  max VC and demonstration required for pt understanding  -MD      Recorded by [MD] Mira Chadwick PT      Row Name 08/06/19 1039             Positioning and Restraints    Pre-Treatment Position  sitting in chair/recliner  -MD      Post Treatment Position  wheelchair  -MD      In Wheelchair  sitting;patient within staff view in DR  -MD      Recorded by [MD] Mira Chadwick PT        User Key  (r) = Recorded By, (t) = Taken By, (c) = Cosigned By    Initials Name Effective Dates    Mira Vaca PT 04/03/18 -     KB Bruton, Katherine L 03/07/18 -         Wound 07/17/19 1015 Left neck incision (Active)   Dressing Appearance open to air 8/5/2019  9:02 PM   Closure Liquid skin adhesive 8/5/2019  9:02 PM   Base clean;dry 8/5/2019  9:02 PM   Drainage Amount none 8/5/2019  9:02 PM     Physical Therapy Education     Title: PT OT SLP Therapies (Not Started)     Topic: Physical Therapy (In Progress)     Point: Mobility training (In Progress)     Learning Progress Summary           Patient Nonacceptance, E,TB,D, NR by KP at 8/5/2019 12:00 PM    Acceptance, E,D, NR by NESTOR at 8/3/2019  1:35 PM     Acceptance, D, DU,NR by NESTOR at 8/3/2019  8:56 AM                   Point: Home exercise program (In Progress)     Learning Progress Summary           Patient Nonacceptance, E,TB,D, NR by ENID at 8/5/2019 12:00 PM                   Point: Precautions (In Progress)     Learning Progress Summary           Patient Acceptance, E, NR by MD at 8/6/2019 10:41 AM    Acceptance, E, NR by MD at 8/2/2019 10:44 AM    Acceptance, E,D, NR by MD at 8/1/2019 12:16 PM                               User Key     Initials Effective Dates Name Provider Type Discipline    NESTOR 04/03/18 -  Nicole Austin, PT Physical Therapist PT    MD 04/03/18 -  Mira Chadwick, PT Physical Therapist PT    ENID 04/03/18 -  Marycruz Schmitt PT Physical Therapist PT                  PT Recommendation and Plan  Anticipated Equipment Needs at Discharge (PT Eval): front wheeled walker  Frequency of Treatment (PT Eval): 5 times per week, 60 minutes per session                     Time Calculation:     PT Charges     Row Name 08/06/19 1039             Time Calculation    Start Time  1030  -MD      Stop Time  1130  -MD      Time Calculation (min)  60 min  -MD      PT Received On  08/06/19  -MD      PT - Next Appointment  08/07/19  -MD        User Key  (r) = Recorded By, (t) = Taken By, (c) = Cosigned By    Initials Name Provider Type    Mira Vaca, PT Physical Therapist          Therapy Charges for Today     Code Description Service Date Service Provider Modifiers Qty    [id number] HC PT THER PROC EA 15 MIN 8/6/2019 Mira Chadwick, PT GP 4                   Mira Chadwick PT  8/6/2019

## 2019-08-06 NOTE — PROGRESS NOTES
Inpatient Rehabilitation Plan of Care Note    Plan of Care  Care Plan Reviewed - No updates at this time.    Psychosocial    Performed Intervention(s)  Assist patient to express needs and concerns      Safety    Performed Intervention(s)  Bed alarm, wc alarm  Safety rounds  Items within reach      Body Systems    Performed Intervention(s)  Daily skin inspection      Sphincter Control    Performed Intervention(s)  Monitor intake and output  Encourage fluid intake  Elimination schedule    Signed by: Martha Smith RN

## 2019-08-06 NOTE — PROGRESS NOTES
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  Does not indicate any complaint of headache.  No acute changes noted in her strength on the right side.       History taken from: patient chart RN    Objective     Vital Signs  Temp:  [97.5 °F (36.4 °C)-98 °F (36.7 °C)] 97.5 °F (36.4 °C)  Heart Rate:  [62-92] 62  Resp:  [18-20] 18  BP: (120-167)/(65-79) 164/66    Objective  Physical Exam  MENTAL STATUS -  AWAKE / ALERT  HEENT- NCAT,   SCLERA NON-ICTERIC, CONJUNCTIVA PINK, OP MOIST, NO JVD, EARS UNREMARKABLE EXTERNALLY  LUNGS - CTA, NO WHEEZES, RALES OR RHONCHI  HEART- RRR, NO RUB, MURMUR, OR GALLOP  ABD - NORMOACTIVE BOWEL SOUNDS, SOFT, NT.    EXT - NO EDEMA OR CYANOSIS  NEURO -alert.  Aphasia.  She will get occasional single word    Does not follow commands.     Right facial droop.   MOTOR EXAM -patient moves the left side more than the right but takes resistance bilaterally         Results Review:     I reviewed the patient's new clinical results.  Glucose   Date/Time Value Ref Range Status   08/06/2019 0737 230 (H) 70 - 130 mg/dL Final   08/05/2019 2051 132 (H) 70 - 130 mg/dL Final   08/05/2019 1602 129 70 - 130 mg/dL Final   08/05/2019 1136 269 (H) 70 - 130 mg/dL Final   08/04/2019 2022 144 (H) 70 - 130 mg/dL Final   08/04/2019 1603 105 70 - 130 mg/dL Final   08/04/2019 1111  216 (H) 70 - 130 mg/dL Final   08/04/2019 0701 155 (H) 70 - 130 mg/dL Final     Results from last 7 days   Lab Units 08/05/19  0726 08/03/19  0746 08/02/19  0720   WBC 10*3/mm3 5.40 8.60 9.89   HEMOGLOBIN g/dL 9.8* 9.7* 9.0*   HEMATOCRIT % 30.8* 31.1* 28.7*   PLATELETS 10*3/mm3 419 448 428       Ref. Range 5/22/2019 05:44 5/22/2019 11:34 7/9/2019 08:25 7/18/2019 04:49 7/19/2019 05:05 7/23/2019 05:56 8/1/2019 06:48   Hemoglobin Latest Ref Range: 12.0 - 15.9 g/dL 10.8 (L)  10.6 (L) 8.5 (L) 9.7 (L) 9.3 (L) 7.4 (L)   Hematocrit Latest Ref Range: 34.0 - 46.6 % 35.1  33.4 (L) 26.2 (L) 29.9 (L) 28.6 (L) 23.6 (L)     Results from last 7 days   Lab Units 08/05/19  0726 08/02/19  0720 08/01/19  0647   SODIUM mmol/L 136 146* 139   POTASSIUM mmol/L 3.4* 3.7 3.7   CHLORIDE mmol/L 99 105 101   CO2 mmol/L 27.3 29.3* 29.1*   BUN mg/dL 18 30* 23   CREATININE mg/dL 0.87 0.92 0.69   CALCIUM mg/dL 9.1 8.8 9.0   BILIRUBIN mg/dL  --   --  0.3   ALK PHOS U/L  --   --  107   ALT (SGPT) U/L  --   --  31   AST (SGOT) U/L  --   --  32   GLUCOSE mg/dL 276* 193* 123*         Ref. Range 8/1/2019 06:47   25 Hydroxy, Vitamin D Latest Ref Range: 30.0 - 100.0 ng/ml 21.8 (L)       Ref. Range 8/1/2019 06:47   Total Cholesterol Latest Ref Range: 0 - 200 mg/dL 105   HDL Cholesterol Latest Ref Range: 40 - 60 mg/dL 40   LDL Cholesterol  Latest Ref Range: 0 - 100 mg/dL 57   VLDL Cholesterol Latest Ref Range: 5 - 40 mg/dL 8   Triglycerides Latest Ref Range: 0 - 150 mg/dL 40   Medication Review: done  Scheduled Meds:    amantadine 100 mg Oral QAM   apixaban 5 mg Oral Q12H   aspirin 325 mg Oral Daily   atorvastatin 80 mg Oral Nightly   brimonidine 1 drop Both Eyes BID   dorzolamide 1 drop Both Eyes BID   glipiZIDE 5 mg Oral BID AC   insulin regular 0-14 Units Subcutaneous TID AC   losartan-HCTZ (HYZAAR) 100-12.5 combo dose  Oral Daily   metFORMIN 500 mg Oral BID With Meals   nebivolol 20 mg Oral BID   nystatin 5 mL Swish & Spit 4x Daily   pantoprazole 40 mg  Oral BID AC   vitamin D 50,000 Units Oral Q7 Days     Continuous Infusions:   PRN Meds:.•  aluminum-magnesium hydroxide-simethicone  •  dextrose  •  dextrose  •  glucagon (human recombinant)  •  nitroglycerin      Assessment/Plan       Stroke (cerebrum) (CMS/HCC)      Assessment & Plan  Status post left CVA with aphasia and spastic right hemiparesis    Neuro stimulation-amantadine added July 27    Dysphagia-Cortrak feeding tube discontinued on July 31 and started on puréed/honey thick liquid diet with strategies    History of multiple bilateral strokes and left central retinal artery occlusion    History of left greater than right internal carotid artery stenosis-status post left internal carotid artery stent July 17, 2019    History of hypertension - Hyzaar/ nebivolol    History of diabetes mellitus -Lantus/glipizide (home medication metformin 1000 mg twice daily and glipizide 10 mg twice daily)  August 1-blood sugars were low yesterday afternoon after tube feeds discontinued.  Held glipizide last night and this morning and Lantus last night.  Blood sugar elevated at noontime today.  Will receive resume glipizide at a lower dose of 5 mg twice a day with meals.  Continue sliding scale insulin coverage.  She also is on metformin at home.  August 5-add back metformin initially at 500 mg twice a day and continue glipizide 5 mg twice a day, both one half of previous home dose, titrate up as needed.    Stroke prophylaxis-aspirin/Eliquis/atorvastatin    Anemia-August 1-hemoglobin 7.4.  Most recent check on July 23 HGB 9.3.  Will recheck hemoglobin this afternoon.  Hemoccult stool.  Iron studies.  Reticulocyte count.  Recheck CBC in the a.m.  Added Protonix.  Patient is on aspirin and Eliquis.  With recent stroke  will look to transfuse packed red blood cell.  August 2-hemoglobin improved 9.0-1 unit packed red blood cells.  Elevated reticulocyte count in response to anemia.  Nursing describes dark stool.  Patient discussed  with gastroenterology.  At this point unable to do endoscopy as on Eliquis and aspirin.  Will treat symptomatically for now with increasing proton pump inhibitor and transfusing as needed.  August 5-anemia improved-hemoglobin 9.8    DVT prophylaxis-SCDs/anticoagulation    Impulsivity-   Nursing to do re-orientation with the patient.  Room close to the nursing station.    Endocrine-vitamin D deficiency-ergocalciferol 50,000 units weekly x8 weeks added. Vitamin B12 level and TSH checked and late May unremarkable     Impaired cognition/impaired language, impaired swallow, impaired activity daily living, impaired mobility     Now admit for comprehensive acute inpatient rehabilitation .  This would be an interdisciplinary program with physical therapy 1 hour,  occupational therapy 1 hour, and speech therapy 1 hour, 5 days a week.  Rehabilitation nursing for carryover, monitoring of cardiopulmonary and neurologic   status, bowel and bladder, and skin  Ongoing physician follow-up.  Weekly team conferences.  Goals are indeterminant.   Rehabilitation prognosis determined.  Medical prognosis determined.  Estimated length of stay is indeterminate.    TEAM CONF - AUGUST 6- TRANFERS CTG. GAIT UP  FEET CTG-MIN ASSIST. UBD MIN. LBD MOD. BATH MOD. SEVERE APHASIA. INCREASED RESISTANCE TO PARTICIPATE AT TIMES.   PUREE/HTL.  GOOD PO INTAKE. ADJUSTING MEDS FOR DIABETES MELLITUS.  BLADDER CONTINENT/INCONTIENT.   ELOS- 2 WEEKS.       Ajit Howard MD  08/06/19  7:45 AM    Time:

## 2019-08-06 NOTE — PROGRESS NOTES
Case Management  Inpatient Rehabilitation Team Conference    Conference Date/Time: 8/6/2019 7:35:22 AM    Team Conference Attendees:  Blanca Winters, ROZW  Mira Chadwick, PT  Ingris Ansari, OT  Katherine Bruton, SLP  Star Capps, SASHA Cotsa, Chaplain Char Carvajal, CTRS  Mira Singh RN    Demographics            Age: 74Y            Gender: Female    Admission Date: 7/31/2019 3:45:00 PM  Rehabilitation Diagnosis:  CVA right neftali  Past Medical History: Vision loss; carotid stenosis, CAD, MI, blood clots, HLD,  HTN; OA; nocturia; CVA x2 5/19; DM      Plan of Care  Anticipated Discharge Date/Estimated Length of Stay: ELOS: 4 weeks  Anticipated Discharge Destination: Community discharge with assistance  Discharge Plan : Pt lives with her  and son in a 2 story home, bed and  full bath on the 2nd level, 1/2 bath on the first.  Pt will discharge to her  home with 24 hour family assistance.  Medical Necessity Expected Level Rationale: Estimated level of assist after  discharge: MIN  Rehab prognosis: indeterminate  Medical prognosis: indeterminate  Intensity and Duration: an average of 3 hours/5 days per week  Medical Supervision and 24 Hour Rehab Nursing: x  Physical Therapy: x  PT Intensity/Duration: 60 minutes/day, 5 days/week for 28 days  Occupational Therapy: x  OT Intensity/Duration: 60 minutes/day, 5 days/week for 28 days  Speech and Language Therapy: x  SLP Intensity/Duration: 60 minutes/day, 5 days/week for 28 days  Social Work: x  Therapeutic Recreation: x  Psychology: x  Updated (if changes indicated)    Anticipated Discharge Date/Estimated Length of Stay:   ELOS: 2 weeks    Based on the patient's medical and functional status, their prognosis and  expected level of functional improvement is: Estimated level of assist after  discharge: MIN  Rehab prognosis: indeterminate  Medical prognosis: indeterminate      Interdisciplinary Problem/Goals/Status    All Rehab  Problems:  Body Systems    [RN] Integumentary(Active)  Current Status(07/31/2019): Incision L neck glued  Weekly Goal(08/07/2019): No S&S infection noted  Discharge Goal: No S&S infection noted        Communication    [ST] Comprehension(Active)  Current Status(08/05/2019): severely impaired  Weekly Goal(08/15/2019): answer simple yes/no questions  Discharge Goal: improved comprehension    [ST] Expression(Active)  Current Status(08/05/2019): severely impaired; single words, perseverations,  neologisms  Weekly Goal(08/15/2019): imitate names of objects for ADL tasks  Discharge Goal: functional expression for basic wants/needs        Mobility    [OT] Toilet Transfers(Active)  Current Status(08/05/2019): MIN  Weekly Goal(08/15/2019): CGA  Discharge Goal: CGA    [OT] Tub/Shower Transfers(Active)  Current Status(08/05/2019): MIN  Weekly Goal(08/15/2019): CGA  Discharge Goal: CGA    [PT] Bed/Chair/Wheelchair(Active)  Current Status(08/05/2019): Min A  Weekly Goal(08/08/2019): CG  Discharge Goal: CGA    [PT] Walk(Active)  Current Status(08/05/2019): 220, Min A  Weekly Goal(08/13/2019): to and from BR, CGA  Discharge Goal: 250` CGA    [PT] Stairs(Active)  Current Status(08/06/2019): 4 steps MIN  Weekly Goal(08/13/2019): PT only  Discharge Goal: 4 steps CGA        Psychosocial    [RN] Coping/Adjustment(Active)  Current Status(07/31/2019): Pt. is non-verbal; Supportive   Weekly Goal(08/07/2019): Identify progress in functional status  Discharge Goal: Demonstrate healthy coping strategies        Safety    [RN] Potential for Injury(Active)  Current Status(07/31/2019): Impulsive; aphasic; does not use call light  Weekly Goal(08/07/2019): Cue to use call light  Discharge Goal: Pt/family will be aware of risk of fall and safety in the home  setting        Self Care    [OT] Bathing(Active)  Current Status(08/05/2019): MOD  Weekly Goal(08/15/2019): MIN  Discharge Goal: CGA    [OT] Dressing (Lower)(Active)  Current  Status(08/05/2019): MOD  Weekly Goal(08/15/2019): MIN  Discharge Goal: CGA    [OT] Dressing (Upper)(Active)  Current Status(08/05/2019): MIN  Weekly Goal(08/15/2019): set up  Discharge Goal: set up    [OT] Grooming(Active)  Current Status(08/05/2019): MOD  Weekly Goal(08/15/2019): MIN  Discharge Goal: CGA    [OT] Toileting(Active)  Current Status(08/05/2019): DEP/MAX  Weekly Goal(08/15/2019): MOD  Discharge Goal: CGA        Sphincter Control    [RN] Bladder Management(Active)  Current Status(07/31/2019): incontinent bladder 100%  Weekly Goal(08/07/2019): continent 50%  Discharge Goal: continent 100%    [RN] Bowel Management(Active)  Current Status(07/31/2019): incontinent bowel 100%  Weekly Goal(08/07/2019): continent bowel 50%  Discharge Goal: continent bowel 100%        Swallow Function    [ST] Swallowing(Active)  Current Status(08/05/2019): VFSS 7/31- mayi, HTL  Weekly Goal(08/15/2019): tolerate current diet without s/s pen/asp  Discharge Goal: tolerate least restrictive diet without s/s pen/asp        Comments: 8/6: walker confuses patient so PT not using one at this time;  decreased coordination right hand; some agitation at times; some unsafe  behaviors;    Signed by: Star Capps RN    Physician CoSigned By: Ajit Howard 08/06/2019 09:24:01

## 2019-08-07 LAB
ANION GAP SERPL CALCULATED.3IONS-SCNC: 12.5 MMOL/L (ref 5–15)
BASOPHILS # BLD AUTO: 0.06 10*3/MM3 (ref 0–0.2)
BASOPHILS NFR BLD AUTO: 0.8 % (ref 0–1.5)
BUN BLD-MCNC: 14 MG/DL (ref 8–23)
BUN/CREAT SERPL: 20.3 (ref 7–25)
CALCIUM SPEC-SCNC: 8.9 MG/DL (ref 8.6–10.5)
CHLORIDE SERPL-SCNC: 99 MMOL/L (ref 98–107)
CO2 SERPL-SCNC: 27.5 MMOL/L (ref 22–29)
CREAT BLD-MCNC: 0.69 MG/DL (ref 0.57–1)
DEPRECATED RDW RBC AUTO: 59.7 FL (ref 37–54)
EOSINOPHIL # BLD AUTO: 0.26 10*3/MM3 (ref 0–0.4)
EOSINOPHIL NFR BLD AUTO: 3.6 % (ref 0.3–6.2)
ERYTHROCYTE [DISTWIDTH] IN BLOOD BY AUTOMATED COUNT: 17.3 % (ref 12.3–15.4)
GFR SERPL CREATININE-BSD FRML MDRD: 83 ML/MIN/1.73
GLUCOSE BLD-MCNC: 201 MG/DL (ref 65–99)
GLUCOSE BLDC GLUCOMTR-MCNC: 101 MG/DL (ref 70–130)
GLUCOSE BLDC GLUCOMTR-MCNC: 185 MG/DL (ref 70–130)
GLUCOSE BLDC GLUCOMTR-MCNC: 193 MG/DL (ref 70–130)
GLUCOSE BLDC GLUCOMTR-MCNC: 242 MG/DL (ref 70–130)
HCT VFR BLD AUTO: 31.3 % (ref 34–46.6)
HGB BLD-MCNC: 9.6 G/DL (ref 12–15.9)
IMM GRANULOCYTES # BLD AUTO: 0.03 10*3/MM3 (ref 0–0.05)
IMM GRANULOCYTES NFR BLD AUTO: 0.4 % (ref 0–0.5)
LYMPHOCYTES # BLD AUTO: 0.95 10*3/MM3 (ref 0.7–3.1)
LYMPHOCYTES NFR BLD AUTO: 13.2 % (ref 19.6–45.3)
MCH RBC QN AUTO: 28.8 PG (ref 26.6–33)
MCHC RBC AUTO-ENTMCNC: 30.7 G/DL (ref 31.5–35.7)
MCV RBC AUTO: 94 FL (ref 79–97)
MONOCYTES # BLD AUTO: 0.37 10*3/MM3 (ref 0.1–0.9)
MONOCYTES NFR BLD AUTO: 5.2 % (ref 5–12)
NEUTROPHILS # BLD AUTO: 5.5 10*3/MM3 (ref 1.7–7)
NEUTROPHILS NFR BLD AUTO: 76.8 % (ref 42.7–76)
NRBC BLD AUTO-RTO: 0 /100 WBC (ref 0–0.2)
PLATELET # BLD AUTO: 374 10*3/MM3 (ref 140–450)
PMV BLD AUTO: 9.5 FL (ref 6–12)
POTASSIUM BLD-SCNC: 3.6 MMOL/L (ref 3.5–5.2)
RBC # BLD AUTO: 3.33 10*6/MM3 (ref 3.77–5.28)
SODIUM BLD-SCNC: 139 MMOL/L (ref 136–145)
WBC NRBC COR # BLD: 7.17 10*3/MM3 (ref 3.4–10.8)

## 2019-08-07 PROCEDURE — 63710000001 INSULIN REGULAR HUMAN PER 5 UNITS: Performed by: PHYSICAL MEDICINE & REHABILITATION

## 2019-08-07 PROCEDURE — 92526 ORAL FUNCTION THERAPY: CPT

## 2019-08-07 PROCEDURE — 92507 TX SP LANG VOICE COMM INDIV: CPT

## 2019-08-07 PROCEDURE — 85025 COMPLETE CBC W/AUTO DIFF WBC: CPT | Performed by: PHYSICAL MEDICINE & REHABILITATION

## 2019-08-07 PROCEDURE — 97535 SELF CARE MNGMENT TRAINING: CPT

## 2019-08-07 PROCEDURE — 97110 THERAPEUTIC EXERCISES: CPT

## 2019-08-07 PROCEDURE — 82962 GLUCOSE BLOOD TEST: CPT

## 2019-08-07 PROCEDURE — 97112 NEUROMUSCULAR REEDUCATION: CPT

## 2019-08-07 PROCEDURE — 80048 BASIC METABOLIC PNL TOTAL CA: CPT | Performed by: PHYSICAL MEDICINE & REHABILITATION

## 2019-08-07 RX ADMIN — DORZOLAMIDE HYDROCHLORIDE 1 DROP: 20 SOLUTION/ DROPS OPHTHALMIC at 22:26

## 2019-08-07 RX ADMIN — METFORMIN HYDROCHLORIDE 500 MG: 500 TABLET ORAL at 16:30

## 2019-08-07 RX ADMIN — NEBIVOLOL HYDROCHLORIDE 20 MG: 10 TABLET ORAL at 22:26

## 2019-08-07 RX ADMIN — METFORMIN HYDROCHLORIDE 500 MG: 500 TABLET ORAL at 07:38

## 2019-08-07 RX ADMIN — NYSTATIN 500000 UNITS: 100000 SUSPENSION ORAL at 11:31

## 2019-08-07 RX ADMIN — APIXABAN 5 MG: 5 TABLET, FILM COATED ORAL at 07:37

## 2019-08-07 RX ADMIN — ATORVASTATIN CALCIUM 80 MG: 80 TABLET, FILM COATED ORAL at 22:26

## 2019-08-07 RX ADMIN — NEBIVOLOL HYDROCHLORIDE 20 MG: 10 TABLET ORAL at 07:37

## 2019-08-07 RX ADMIN — LOSARTAN POTASSIUM: 50 TABLET, FILM COATED ORAL at 07:37

## 2019-08-07 RX ADMIN — LANSOPRAZOLE 30 MG: KIT at 06:14

## 2019-08-07 RX ADMIN — LANSOPRAZOLE 30 MG: KIT at 16:30

## 2019-08-07 RX ADMIN — NYSTATIN 500000 UNITS: 100000 SUSPENSION ORAL at 16:31

## 2019-08-07 RX ADMIN — NYSTATIN 500000 UNITS: 100000 SUSPENSION ORAL at 22:26

## 2019-08-07 RX ADMIN — ASPIRIN 325 MG: 325 TABLET ORAL at 07:36

## 2019-08-07 RX ADMIN — BRIMONIDINE TARTRATE 1 DROP: 2 SOLUTION OPHTHALMIC at 22:26

## 2019-08-07 RX ADMIN — BRIMONIDINE TARTRATE 1 DROP: 2 SOLUTION OPHTHALMIC at 07:36

## 2019-08-07 RX ADMIN — GLIPIZIDE 5 MG: 5 TABLET ORAL at 16:30

## 2019-08-07 RX ADMIN — INSULIN HUMAN 5 UNITS: 100 INJECTION, SOLUTION PARENTERAL at 11:29

## 2019-08-07 RX ADMIN — NYSTATIN 500000 UNITS: 100000 SUSPENSION ORAL at 07:36

## 2019-08-07 RX ADMIN — AMANTADINE HYDROCHLORIDE 100 MG: 100 CAPSULE ORAL at 06:13

## 2019-08-07 RX ADMIN — DORZOLAMIDE HYDROCHLORIDE 1 DROP: 20 SOLUTION/ DROPS OPHTHALMIC at 07:36

## 2019-08-07 RX ADMIN — INSULIN HUMAN 3 UNITS: 100 INJECTION, SOLUTION PARENTERAL at 07:39

## 2019-08-07 RX ADMIN — APIXABAN 5 MG: 5 TABLET, FILM COATED ORAL at 22:27

## 2019-08-07 RX ADMIN — GLIPIZIDE 5 MG: 5 TABLET ORAL at 07:19

## 2019-08-07 NOTE — PLAN OF CARE
Problem: Skin Injury Risk (Adult)  Goal: Skin Health and Integrity  Outcome: Ongoing (interventions implemented as appropriate)      Problem: Patient Care Overview  Goal: Plan of Care Review  Outcome: Ongoing (interventions implemented as appropriate)   08/06/19 1654 08/06/19 2159 08/07/19 0220   Patient Care Overview   IRF Plan of Care Review progress ongoing, continue --  --    Progress, Functional Goals demonstrating adequate progress --  --    Coping/Psychosocial   Plan of Care Reviewed With --  patient --    OTHER   Outcome Summary --  --  Meds crushed with applesauce, ate applesauce for snack. Has slept without c/o pain, Has not used call light. Verbal cues given for safety. Ambulates CGA 1. Incont. bm, cont. bladder at HS.     Goal: Individualization and Mutuality  Outcome: Ongoing (interventions implemented as appropriate)    Goal: Discharge Needs Assessment  Outcome: Ongoing (interventions implemented as appropriate)    Goal: Coping Plan  Outcome: Ongoing (interventions implemented as appropriate)      Problem: Stroke (IRF) (Adult)  Goal: Promote Optimal Functional [place]  Outcome: Ongoing (interventions implemented as appropriate)

## 2019-08-07 NOTE — THERAPY TREATMENT NOTE
Inpatient Rehabilitation - Physical Therapy Treatment Note  [facility]     Patient Name: Darby Workman  : 1945  MRN: [id number]    Today's Date: 2019                 Admit Date: 2019      Visit Dx:    No diagnosis found.    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)   • Right-sided headache   • Acute ischemic stroke (CMS/HCC)   • Embolic stroke (CMS/HCC)   • Cerebral infarction due to stenosis of left carotid artery (CMS/HCC)   • Anticoagulated by anticoagulation treatment   • Stroke (cerebrum) (CMS/HCC)       Therapy Treatment    IRF Treatment Summary     Row Name 19 1130 19 1047 19 0930       Evaluation/Treatment Time and Intent    Subjective Information  no complaints  -KB  no complaints  -MD  no complaints  -KB    Existing Precautions/Restrictions  fall swallow, communication  -KB  fall  -MD  fall swallow, communication  -KB    Document Type  therapy note (daily note)  -KB  therapy note (daily note)  -MD  therapy note (daily note)  -KB    Mode of Treatment  speech-language pathology  -KB  physical therapy  -MD  speech-language pathology  -KB    Patient/Family Observations  seated in wc in dining room; pleasant and cooperative throughout meal observation with test tray  -KB  Pt seated in WC at nsg station showing no signs of acute distress.  -MD  seated in wc at nurses station; agreeable to therapy  -KB    Start Time (Evaluation/Treatment)  1130  -KB  --  0930  -KB    Stop Time (Evaluation/Treatment)  1200  -KB  --  1000  -KB    Recorded by [KB] Bruton, Katherine L [MD] Mira Chadwick, PT [KB] Bruton, Katherine L    Row Name 19 1047             Cognition/Psychosocial- PT/OT    Orientation Status (Cognition)  unable/difficult to assess  -MD      Follows Commands (Cognition)  follows one step  commands;0-24% accuracy;25-49% accuracy;increased processing time needed;physical/tactile prompts required;repetition of directions required;verbal cues/prompting required  -MD      Personal Safety Interventions  fall prevention program maintained;gait belt  -MD      Recorded by [MD] Mira Chadwick, PT      Row Name 08/07/19 1047             Sit-Stand Transfer    Sit-Stand [place] (Transfers)  verbal cues;contact guard  -MD      Recorded by [MD] Mira Chadwick, PT      Row Name 08/07/19 1047             Stand-Sit Transfer    Stand-Sit [place] (Transfers)  contact guard;verbal cues  -MD      Recorded by [MD] Mira Chadwick, PT      Row Name 08/07/19 1047             Gait/Stairs Assessment/Training    [place] Level (Gait)  verbal cues;contact guard  -MD      Distance in Feet (Gait)  200x2  -MD      Pattern (Gait)  step-through  -MD      Deviations/Abnormal Patterns (Gait)  base of support, narrow;festinating/shuffling;gait speed decreased;stride length decreased  -MD      Bilateral Gait Deviations  forward flexed posture  -MD      Lula Level (Stairs)  verbal cues;contact guard  -MD      Handrail Location (Stairs)  both sides  -MD      Number of Steps (Stairs)  4  -MD      Ascending Technique (Stairs)  step-over-step  -MD      Descending Technique (Stairs)  step-to-step  -MD      Recorded by [MD] Mira Chadwick, PT      Row Name 08/07/19 1130 08/07/19 1047 08/07/19 0930       Pain Scale: Numbers Pre/Post-Treatment    Pain Scale: Numbers, Pretreatment  0/10 - no pain  -KB  0/10 - no pain  -MD  0/10 - no pain  -KB    Pain Scale: Numbers, Post-Treatment  0/10 - no pain  -KB  --  0/10 - no pain  -KB    Recorded by [KB] Bruton, Katherine L [MD] Mira Chadwick, PT [KB] Bruton, Katherine L    Row Name 08/07/19 1047             Positioning and Restraints    Pre-Treatment Position  sitting in chair/recliner  -MD      Post Treatment Position  wheelchair  -MD      In Wheelchair  sitting;exit alarm on;patient within staff view   -MD      Recorded by [MD] Mira Chadwick, PT        User Key  (r) = Recorded By, (t) = Taken By, (c) = Cosigned By    Initials Name Effective Dates    Mira Vaca, PT 04/03/18 -     KB Bruton, Katherine L 03/07/18 -         Wound 07/17/19 1015 Left neck incision (Active)   Dressing Appearance open to air 8/7/2019  8:45 AM   Closure Liquid skin adhesive 8/7/2019  8:45 AM   Base clean;dry 8/6/2019  9:59 PM   Drainage Amount none 8/7/2019  8:45 AM     Physical Therapy Education     Title: PT OT SLP Therapies (Not Started)     Topic: Physical Therapy (In Progress)     Point: Mobility training (In Progress)     Learning Progress Summary           Patient Nonacceptance, E,TB,D, NR by ENID at 8/5/2019 12:00 PM    Acceptance, E,D, NR by NESTOR at 8/3/2019  1:35 PM    Acceptance, D, DU,NR by NESTOR at 8/3/2019  8:56 AM                   Point: Home exercise program (In Progress)     Learning Progress Summary           Patient Nonacceptance, E,TB,D, NR by ENID at 8/5/2019 12:00 PM                   Point: Precautions (In Progress)     Learning Progress Summary           Patient Acceptance, E, NR by MD at 8/6/2019 10:41 AM    Acceptance, E, NR by MD at 8/2/2019 10:44 AM    Acceptance, E,D, NR by MD at 8/1/2019 12:16 PM                               User Key     Initials Effective Dates Name Provider Type Discipline     04/03/18 -  Nicole Austin, PT Physical Therapist PT    MD 04/03/18 -  Mira Chadwick PT Physical Therapist PT     04/03/18 -  Marycruz Schmitt PT Physical Therapist PT                  PT Recommendation and Plan  Anticipated Equipment Needs at Discharge (PT Eval): front wheeled walker  Frequency of Treatment (PT Eval): 5 times per week, 60 minutes per session                     Time Calculation:     PT Charges     Row Name 08/07/19 1047             Time Calculation    Start Time  1030  -MD      Stop Time  1100  -MD      Time Calculation (min)  30 min  -MD      PT Received On  08/07/19  -MD      PT - Next Appointment   08/08/19  -MD        User Key  (r) = Recorded By, (t) = Taken By, (c) = Cosigned By    Initials Name Provider Type    Mira Vaca, PT Physical Therapist          Therapy Charges for Today     Code Description Service Date Service Provider Modifiers Qty    [id number]  PT THER PROC EA 15 MIN 8/6/2019 Mira Chadwick, PT GP 4    [id number]  PT THER PROC EA 15 MIN 8/7/2019 Mira Chadwick PT GP 2                   Mira Chadwick, HESHAM  8/7/2019

## 2019-08-07 NOTE — THERAPY TREATMENT NOTE
Inpatient Rehabilitation - Speech Language Pathology Treatment Note    [facility]       Patient Name: Darby Workman  : 1945  MRN: [id number]    Today's Date: 2019           Admit Date: 2019      Visit Dx:      No diagnosis found.    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)   • Right-sided headache   • Acute ischemic stroke (CMS/HCC)   • Embolic stroke (CMS/HCC)   • Cerebral infarction due to stenosis of left carotid artery (CMS/HCC)   • Anticoagulated by anticoagulation treatment   • Stroke (cerebrum) (CMS/HCC)          Therapy Treatment    Evaluation/Coping    Evaluation/Treatment Time and Intent  Subjective Information: no complaints (19 1130 : Bruton, Katherine L)  Existing Precautions/Restrictions: fall(swallow, communication) (19 1130 : Bruton, Katherine L)  Document Type: therapy note (daily note) (19 1130 : Bruton, Katherine L)  Mode of Treatment: speech-language pathology (19 1130 : Bruton, Katherine L)  Patient/Family Observations: seated in wc in dining room; pleasant and cooperative throughout meal observation with test tray (19 1130 : Bruton, Katherine L)  Start Time (Evaluation/Treatment): 1130 (19 1130 : Bruton, Katherine L)  Stop Time (Evaluation/Treatment): 1200 (19 1130 : Bruton, Katherine L)    Vitals/Pain/Safety    Pain Scale: Numbers Pre/Post-Treatment  Pain Scale: Numbers, Pretreatment: 0/10 - no pain (19 1130 : Bruton, Katherine L)  Pain Scale: Numbers, Post-Treatment: 0/10 - no pain (19 1130 : Bruton, Katherine L)      EDUCATION    The patient has been educated in the following areas:     Cognitive Impairment Communication Impairment.    SLP Recommendation and Plan    SLP Diagnosis: severe global aphasia, cognitive impairment    SLP Diagnosis:  severe global aphasia, cognitive impairment    Rehab Potential/Prognosis: good         Anticipated Dischage Disposition: home with assist, anticipate therapy at next level of care              Predicted Duration Therapy Intervention (Days): until discharge           Plan of Care Reviewed With: patient      SLP GOALS     Row Name 08/07/19 1130 08/07/19 0930 08/06/19 1230       Oral Nutrition/Hydration Goal 2 (SLP)    Oral Nutrition/Hydration Goal 2, SLP  Pt will tolerate fork-mashed, NTL without s/s pen/asp.   -KB  --  Pt will tolerate honey and puree without overt s/s of aspiration, pocketing, or anterior loss across three meal observations.  -KB    Time Frame (Oral Nutrition/Hydration Goal 2, SLP)  by discharge  -KB  --  by discharge  -KB    Barriers (Oral Nutrition/Hydration Goal 2, SLP)  Patient is consistently tolerating puree/HTL for meals at this time. Trials of NTL, TL, puree, mech soft, mixed consistency, and regular solids were tested this date. Right facial weakness persists, which resulted in mild oal residue in the right buccal cavity, which patient was able to clear with cues lingual sweep and liquid wash. Slow but adequate mastication observed with regular solid, mild lingual residue cleared with liquid wash. No overt s/s pen/asp with any consistencies tested, including mixed consistency, NTL via cup/straw, and TL via cup/straw. Plan to observe patient with test tray of mech soft, TL next date.   -KB  --  Patient is consistently tolerating puree/HTL for meals at this time. Trials of NTL, TL, puree, mech soft, mixed consistency, and regular solids were tested this date. Right facial weakness persists, which resulted in mild oal residue in the right buccal cavity, which patient was able to clear with cues lingual sweep and liquid wash. Slow but adequate mastication observed with regular solid, mild lingual residue cleared with liquid wash. No overt s/s pen/asp with any consistencies tested, including  mixed consistency, NTL via cup/straw, and TL via cup/straw. Plan to observe patient with test tray of [facility] soft, TL next date.   -KB    Progress/Outcomes (Oral Nutrition/Hydration Goal 2, SLP)  goal met goal modified  -KB  --  good progress toward goal;goal ongoing  -KB       Words/Phrases/Sentences Goal 1 (SLP)    Progress (Ability to Contruct Words/Phrases/Sentences Goal 1, SLP)  --  with moderate cues (50-74%)  -KB  --    Progress/Outcomes (Identify Objects and Pictures Goal 1, SLP)  --  good progress toward goal;goal ongoing  -KB  --    Comment (Words/Phrases/Sentences Goal 1, SLP)  --  60% identifying named body parts, 100% following direct model  -KB  --       Reading Comprehension of Basic Signs and Letters Goal 1 (SLP)    Progress (Reading Comprehension of Basic Signs and Letters Goal 1, SLP)  --  with moderate cues (50-74%);with maximum cues (25-49%)  -KB  --    Progress/Outcomes (Reading Comprehension of Basic Signs and Letters Goal 1, SLP)  --  goal ongoing  -KB  --    Comment (Reading Comprehension of Basic Signs and Letters Goal 1, SLP)  --  53% sequencing letters A-M, 100% with mod-max cues  -KB  --       Planning and Execution of Connected Speech Goal 1 (SLP)    Progress (Planning and Execution of Connected Speech Goal 1, SLP)  --  with moderate cues (50-74%);with maximum cues (25-49%)  -KB  --    Progress/Outcomes (Planning and Execution of Connected Speech Goal 1, SLP)  --  good progress toward goal;goal ongoing  -KB  --    Comment (Planning and Execution of Connected Speech Goal 1, SLP)  --  38% imitating functional CV syllable words, 63% with mod cues  -KB  --       Additional Goal 1 (SLP)    Barriers (Additional Goal 1, SLP)  --  100% matching like numbers from one side of the page to the other after initial model for instruction  -KB  --    Progress/Outcomes (Additional Goal 1, SLP)  --  goal ongoing  -KB  --    Row Name 08/06/19 0930 08/05/19 1400 08/05/19 0930       Words/Phrases/Sentences  Goal 1 (SLP)    Progress (Ability to Contruct Words/Phrases/Sentences Goal 1, SLP)  with maximum cues (25-49%)  -KB  with maximum cues (25-49%)  -KB  --    Progress/Outcomes (Identify Objects and Pictures Goal 1, SLP)  goal ongoing  -KB  goal ongoing  -KB  --    Comment (Words/Phrases/Sentences Goal 1, SLP)  30% identifying body parts by name, 100% with direct physical model  -KB  30% identifying named body parts, 90% with direct physical model  -KB  --       Reading Comprehension of Basic Signs and Letters Goal 1 (SLP)    Reading Comprehension of Basic Signs and Letters Goal 1 (SLP)  --  --  match printed letter to picture;match printed letter to spoken letter;90%;independently (over 90% accuracy)  -KB    Time Frame (Reading Comprehension of Basic Signs and Letters Goal 1, SLP)  --  --  by discharge  -KB    Barriers (Reading Comprehension of Basic Signs and Letters Goal 1, SLP)  --  --  new goal  -KB    Progress (Reading Comprehension of Basic Signs and Letters Goal 1, SLP)  with maximum cues (25-49%)  -KB  with moderate cues (50-74%)  -KB  with moderate cues (50-74%)  -KB    Progress/Outcomes (Reading Comprehension of Basic Signs and Letters Goal 1, SLP)  goal ongoing  -KB  goal ongoing  -KB  goal ongoing  -KB    Comment (Reading Comprehension of Basic Signs and Letters Goal 1, SLP)  30% identifying named letter in fo2  -KB  50% identifying named letter in fo2  -KB  60% identifying named letter in fo2  -KB       Word Retrieval Skills Goal 1 (SLP)    Progress (Word Retrieval Skills Goal 1, SLP)  with maximum cues (25-49%);with 1:1 supervision/constant cues  -KB  --  with maximum cues (25-49%)  -KB    Progress/Outcomes (Word Retrieval Goal 1, SLP)  goal ongoing  -KB  --  goal ongoing  -KB    Comment (Word Retrieval Goal 1, SLP)  20%, 30%, 20% counting 1-10 with verbal model, visual and tactile cues  -KB  --  30%, 50%, 30% couting 1-10 with direct verbal model across 3 trials respectively  -KB       Word Retrieval  "Skills Goal 2 (SLP)    Progress/Outcomes (Word Retrieval Goal 2, SLP)  --  --  goal ongoing  -KB    Comment (Word Retrieval Goal 2, SLP)  --  --  increased accuracy with some functional phrases, but not able to complete an entire thought (i.e. \"I just don't know what...\", \"There's so much...\")  -KB       Graphic Expression of Shapes, Letters, Numbers Goal 1 (SLP)    Progress (Graphic Expression of Shapes, Letters, and Numbers Goal 1, SLP)  --  with moderate cues (50-74%);with maximum cues (25-49%)  -KB  --    Progress/Outcomes (Graphic Expression of Shapes, Letters, and Numbers Goal 1, SLP)  --  goal ongoing  -KB  --    Comment (Graphic Expression of Shapes, Letters, and Numbers Goal 1, SLP)  --  60% accuracy arranging letter manipulatives to spell her name given written model; 40% accuracy copying her written name given written model, [place] assist and written letters to trace required to complete her name  -KB  --       Planning and Execution of Connected Speech Goal 1 (SLP)    Progress (Planning and Execution of Connected Speech Goal 1, SLP)  with moderate cues (50-74%)  -KB  --  --    Progress/Outcomes (Planning and Execution of Connected Speech Goal 1, SLP)  goal ongoing  -KB  --  --    Comment (Planning and Execution of Connected Speech Goal 1, SLP)  50% imitating functional CV syllable words with mod cues  -KB  --  --       Augmentative/Alternative Communication Objectives Goal 1 (SLP    Progress (Augmentative/Alternative Communication Goal 1, SLP)  with 1:1 supervision/constant cues  -KB  --  --    Progress/Outcomes (Augmentative/Alternative Communication Goal 1, SLP)  goal ongoing  -KB  --  --    Comment (Augmentative/Alternative Communication Goal 1, SLP)  Provided [place] assist and verbal models to introduce 6 requests on basic communication picture board, minimal comprehension at this time  -KB  --  --       Additional Goal 1 (SLP)    Additional Goal 1, SLP  --  --  Complete diagnostic treatment tasks for " cognition.   -KB    Time Frame (Additional Goal 1, SLP)  --  --  by discharge  -KB    Barriers (Additional Goal 1, SLP)  100% sequencing visual numbers 1-10  -KB  --  100% sequencing visual numbers 1-10; 71% matching like shapes from one side of the page to the other  -KB    Progress/Outcomes (Additional Goal 1, SLP)  goal ongoing  -KB  --  goal ongoing  -KB      User Key  (r) = Recorded By, (t) = Taken By, (c) = Cosigned By    Initials Name Provider Type    KB Bruton, Katherine L Speech and Language Pathologist             SLP Outcome Measures (last 72 hours)      SLP Outcome Measures     Row Name 08/07/19 1200             SLP Outcome Measures    Outcome Measure Used?  Adult NOMS  -KB         Adult FCM Scores    FCM Chosen  Swallowing  -KB      Swallowing FCM Score  4  -KB        User Key  (r) = Recorded By, (t) = Taken By, (c) = Cosigned By    Initials Name Effective Dates    KB Bruton, Katherine L 03/07/18 -         Time Calculation:   Time Calculation- SLP     Row Name 08/07/19 1200 08/07/19 1000          Time Calculation- SLP    SLP Start Time  1130  -KB  0930  -KB     SLP Stop Time  1200  -KB  1000  -KB     SLP Time Calculation (min)  30 min  -KB  30 min  -KB     SLP Received On  08/07/19  -KB  08/07/19  -KB       User Key  (r) = Recorded By, (t) = Taken By, (c) = Cosigned By    Initials Name Provider Type    KB Bruton, Katherine L Speech and Language Pathologist            Therapy Charges for Today     Code Description Service Date Service Provider Modifiers Qty    10652835665 HC ST TREATMENT SWALLOW 2 8/6/2019 Bruton, Katherine L GN 1    [id number] HC ST TREATMENT SPEECH 2 8/6/2019 Bruton, Katherine L GN 1    [id number] HC ST TREATMENT SPEECH 2 8/7/2019 Bruton, Katherine L GN 1    [id number] HC ST TREATMENT SWALLOW 2 8/7/2019 Bruton, Katherine L GN 1               ADULT NOMS (last 72 hours)      Adult NOMS     Row Name 08/07/19 1200                   Adult FCM Scores    FCM Chosen  Swallowing  -KB         Swallowing FCM Score  4  -KB          User Key  (r) = Recorded By, (t) = Taken By, (c) = Cosigned By    Initials Name Effective Dates    KB Bruton, Katherine L 18 -         Katherine L Bruton  2019     and Inpatient Rehabilitation - [facility] Speech Language Pathology   Swallow Treatment Note/Discharge   [facility]     Patient Name: Darby Workman  : 1945  MRN: [id number]  Today's Date: 2019               Admit Date: 2019    Visit Dx:    No diagnosis found.  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)   • Right-sided headache   • Acute ischemic stroke (CMS/HCC)   • Embolic stroke (CMS/HCC)   • Cerebral infarction due to stenosis of left carotid artery (CMS/HCC)   • Anticoagulated by anticoagulation treatment   • Stroke (cerebrum) (CMS/HCC)       Therapy Treatment    Evaluation/Coping    Evaluation/Treatment Time and Intent  Subjective Information: no complaints (19 1130 : Bruton, Katherine L)  Existing Precautions/Restrictions: fall(swallow, communication) (19 1130 : Bruton, Katherine L)  Document Type: therapy note (daily note) (19 1130 : Bruton, Katherine L)  Mode of Treatment: speech-language pathology (19 1130 : Bruton, Katherine L)  Patient/Family Observations: seated in wc in dining room; pleasant and cooperative throughout meal observation with test tray (19 1130 : Bruton, Katherine L)  Start Time (Evaluation/Treatment): 1130 (19 1130 : Bruton, Katherine L)  Stop Time (Evaluation/Treatment): 1200 (19 1130 : Bruton, Katherine L)    Vitals/Pain/Safety    Pain Scale: Numbers Pre/Post-Treatment  Pain Scale: Numbers, Pretreatment: 0/10 - no pain (19 1130 : Bruton, Katherine L)  Pain Scale: Numbers, Post-Treatment: 0/10 - no pain (19 1130 : Bruton, Katherine  L)    SLP GOALS     Row Name 08/07/19 1130 08/07/19 0930 08/06/19 1230       Oral Nutrition/Hydration Goal 2 (SLP)    Oral Nutrition/Hydration Goal 2, SLP  Pt will tolerate fork-mashed, NTL without s/s pen/asp.   -KB  --  Pt will tolerate honey and puree without overt s/s of aspiration, pocketing, or anterior loss across three meal observations.  -KB    Time Frame (Oral Nutrition/Hydration Goal 2, SLP)  by discharge  -KB  --  by discharge  -KB    Barriers (Oral Nutrition/Hydration Goal 2, SLP)  Patient is consistently tolerating puree/HTL for meals at this time. Trials of NTL, TL, puree, mech soft, mixed consistency, and regular solids were tested this date. Right facial weakness persists, which resulted in mild oal residue in the right buccal cavity, which patient was able to clear with cues lingual sweep and liquid wash. Slow but adequate mastication observed with regular solid, mild lingual residue cleared with liquid wash. No overt s/s pen/asp with any consistencies tested, including mixed consistency, NTL via cup/straw, and TL via cup/straw. Plan to observe patient with test tray of mech soft, TL next date.   -KB  --  Patient is consistently tolerating puree/HTL for meals at this time. Trials of NTL, TL, puree, mech soft, mixed consistency, and regular solids were tested this date. Right facial weakness persists, which resulted in mild oal residue in the right buccal cavity, which patient was able to clear with cues lingual sweep and liquid wash. Slow but adequate mastication observed with regular solid, mild lingual residue cleared with liquid wash. No overt s/s pen/asp with any consistencies tested, including mixed consistency, NTL via cup/straw, and TL via cup/straw. Plan to observe patient with test tray of mech soft, TL next date.   -KB    Progress/Outcomes (Oral Nutrition/Hydration Goal 2, SLP)  goal met goal modified  -KB  --  good progress toward goal;goal ongoing  -KB       Words/Phrases/Sentences Goal  1 (SLP)    Progress (Ability to Contruct Words/Phrases/Sentences Goal 1, SLP)  --  with moderate cues (50-74%)  -KB  --    Progress/Outcomes (Identify Objects and Pictures Goal 1, SLP)  --  good progress toward goal;goal ongoing  -KB  --    Comment (Words/Phrases/Sentences Goal 1, SLP)  --  60% identifying named body parts, 100% following direct model  -KB  --       Reading Comprehension of Basic Signs and Letters Goal 1 (SLP)    Progress (Reading Comprehension of Basic Signs and Letters Goal 1, SLP)  --  with moderate cues (50-74%);with maximum cues (25-49%)  -KB  --    Progress/Outcomes (Reading Comprehension of Basic Signs and Letters Goal 1, SLP)  --  goal ongoing  -KB  --    Comment (Reading Comprehension of Basic Signs and Letters Goal 1, SLP)  --  53% sequencing letters A-M, 100% with mod-max cues  -KB  --       Planning and Execution of Connected Speech Goal 1 (SLP)    Progress (Planning and Execution of Connected Speech Goal 1, SLP)  --  with moderate cues (50-74%);with maximum cues (25-49%)  -KB  --    Progress/Outcomes (Planning and Execution of Connected Speech Goal 1, SLP)  --  good progress toward goal;goal ongoing  -KB  --    Comment (Planning and Execution of Connected Speech Goal 1, SLP)  --  38% imitating functional CV syllable words, 63% with mod cues  -KB  --       Additional Goal 1 (SLP)    Barriers (Additional Goal 1, SLP)  --  100% matching like numbers from one side of the page to the other after initial model for instruction  -KB  --    Progress/Outcomes (Additional Goal 1, SLP)  --  goal ongoing  -KB  --    Row Name 08/06/19 0930 08/05/19 1400 08/05/19 0930       Words/Phrases/Sentences Goal 1 (SLP)    Progress (Ability to Contruct Words/Phrases/Sentences Goal 1, SLP)  with maximum cues (25-49%)  -KB  with maximum cues (25-49%)  -KB  --    Progress/Outcomes (Identify Objects and Pictures Goal 1, SLP)  goal ongoing  -KB  goal ongoing  -KB  --    Comment (Words/Phrases/Sentences Goal 1, SLP)   "30% identifying body parts by name, 100% with direct physical model  -KB  30% identifying named body parts, 90% with direct physical model  -KB  --       Reading Comprehension of Basic Signs and Letters Goal 1 (SLP)    Reading Comprehension of Basic Signs and Letters Goal 1 (SLP)  --  --  match printed letter to picture;match printed letter to spoken letter;90%;independently (over 90% accuracy)  -KB    Time Frame (Reading Comprehension of Basic Signs and Letters Goal 1, SLP)  --  --  by discharge  -KB    Barriers (Reading Comprehension of Basic Signs and Letters Goal 1, SLP)  --  --  new goal  -KB    Progress (Reading Comprehension of Basic Signs and Letters Goal 1, SLP)  with maximum cues (25-49%)  -KB  with moderate cues (50-74%)  -KB  with moderate cues (50-74%)  -KB    Progress/Outcomes (Reading Comprehension of Basic Signs and Letters Goal 1, SLP)  goal ongoing  -KB  goal ongoing  -KB  goal ongoing  -KB    Comment (Reading Comprehension of Basic Signs and Letters Goal 1, SLP)  30% identifying named letter in fo2  -KB  50% identifying named letter in fo2  -KB  60% identifying named letter in fo2  -KB       Word Retrieval Skills Goal 1 (SLP)    Progress (Word Retrieval Skills Goal 1, SLP)  with maximum cues (25-49%);with 1:1 supervision/constant cues  -KB  --  with maximum cues (25-49%)  -KB    Progress/Outcomes (Word Retrieval Goal 1, SLP)  goal ongoing  -KB  --  goal ongoing  -KB    Comment (Word Retrieval Goal 1, SLP)  20%, 30%, 20% counting 1-10 with verbal model, visual and tactile cues  -KB  --  30%, 50%, 30% couting 1-10 with direct verbal model across 3 trials respectively  -KB       Word Retrieval Skills Goal 2 (SLP)    Progress/Outcomes (Word Retrieval Goal 2, SLP)  --  --  goal ongoing  -KB    Comment (Word Retrieval Goal 2, SLP)  --  --  increased accuracy with some functional phrases, but not able to complete an entire thought (i.e. \"I just don't know what...\", \"There's so much...\")  -KB       " Graphic Expression of Shapes, Letters, Numbers Goal 1 (SLP)    Progress (Graphic Expression of Shapes, Letters, and Numbers Goal 1, SLP)  --  with moderate cues (50-74%);with maximum cues (25-49%)  -KB  --    Progress/Outcomes (Graphic Expression of Shapes, Letters, and Numbers Goal 1, SLP)  --  goal ongoing  -KB  --    Comment (Graphic Expression of Shapes, Letters, and Numbers Goal 1, SLP)  --  60% accuracy arranging letter manipulatives to spell her name given written model; 40% accuracy copying her written name given written model, [place] assist and written letters to trace required to complete her name  -KB  --       Planning and Execution of Connected Speech Goal 1 (SLP)    Progress (Planning and Execution of Connected Speech Goal 1, SLP)  with moderate cues (50-74%)  -KB  --  --    Progress/Outcomes (Planning and Execution of Connected Speech Goal 1, SLP)  goal ongoing  -KB  --  --    Comment (Planning and Execution of Connected Speech Goal 1, SLP)  50% imitating functional CV syllable words with mod cues  -KB  --  --       Augmentative/Alternative Communication Objectives Goal 1 (SLP    Progress (Augmentative/Alternative Communication Goal 1, SLP)  with 1:1 supervision/constant cues  -KB  --  --    Progress/Outcomes (Augmentative/Alternative Communication Goal 1, SLP)  goal ongoing  -KB  --  --    Comment (Augmentative/Alternative Communication Goal 1, SLP)  Provided [place] assist and verbal models to introduce 6 requests on basic communication picture board, minimal comprehension at this time  -KB  --  --       Additional Goal 1 (SLP)    Additional Goal 1, SLP  --  --  Complete diagnostic treatment tasks for cognition.   -KB    Time Frame (Additional Goal 1, SLP)  --  --  by discharge  -KB    Barriers (Additional Goal 1, SLP)  100% sequencing visual numbers 1-10  -KB  --  100% sequencing visual numbers 1-10; 71% matching like shapes from one side of the page to the other  -KB    Progress/Outcomes (Additional Goal  1, SLP)  goal ongoing  -KB  --  goal ongoing  -KB      User Key  (r) = Recorded By, (t) = Taken By, (c) = Cosigned By    Initials Name Provider Type    KB Bruton, Katherine L Speech and Language Pathologist        EDUCATION  The patient has been educated in the following areas:   Dysphagia (Swallowing Impairment) Modified Diet Instruction.    SLP Recommendation and Plan  Plan of Care Reviewed With: patient  IRF Plan of Care Review: progress ongoing, continue  Progress, Functional Goals: demonstrating adequate progress  Outcome Summary: At this time, patient is appropriate to advance to fork-mashed diet and nextar-thick liquids. Meds can be taken whole in puree or with NTL as tolerated. She can have ice chips or water between meals and after oral care. Patient must have supervision with all PO, be seated upright, cues may be needed for slowed rate of feeding, check for pocketing of food on the right side. ST to continue to monitor diet tolerance.        SLP Outcome Measures (last 72 hours)      SLP Outcome Measures     Row Name 08/07/19 1200             SLP Outcome Measures    Outcome Measure Used?  Adult NOMS  -KB         Adult FCM Scores    FCM Chosen  Swallowing  -KB      Swallowing FCM Score  4  -KB        User Key  (r) = Recorded By, (t) = Taken By, (c) = Cosigned By    Initials Name Effective Dates    KB Bruton, Katherine L 03/07/18 -         Time Calculation:   Time Calculation- SLP     Row Name 08/07/19 1200 08/07/19 1000          Time Calculation- SLP    SLP Start Time  1130  -KB  0930  -KB     SLP Stop Time  1200  -KB  1000  -KB     SLP Time Calculation (min)  30 min  -KB  30 min  -KB     SLP Received On  08/07/19  -KB  08/07/19  -KB       User Key  (r) = Recorded By, (t) = Taken By, (c) = Cosigned By    Initials Name Provider Type    KB Bruton, Katherine L Speech and Language Pathologist        Therapy Charges for Today     Code Description Service Date Service Provider Modifiers Qty    11869917847  ST  TREATMENT SWALLOW 2 8/6/2019 Bruton, Katherine L GN 1    [id number] HC ST TREATMENT SPEECH 2 8/6/2019 Bruton, Katherine L GN 1    [id number] HC ST TREATMENT SPEECH 2 8/7/2019 Bruton, Katherine L GN 1    [id number] HC ST TREATMENT SWALLOW 2 8/7/2019 Bruton, Katherine L GN 1        SLP Discharge Summary  Anticipated Dischage Disposition: home with assist, anticipate therapy at next level of care    Katherine L Bruton  8/7/2019

## 2019-08-07 NOTE — THERAPY TREATMENT NOTE
Inpatient Rehabilitation - Physical Therapy Treatment Note  [facility]     Patient Name: Darby Workman  : 1945  MRN: [id number]    Today's Date: 2019                 Admit Date: 2019      Visit Dx:    No diagnosis found.    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)   • Right-sided headache   • Acute ischemic stroke (CMS/HCC)   • Embolic stroke (CMS/HCC)   • Cerebral infarction due to stenosis of left carotid artery (CMS/HCC)   • Anticoagulated by anticoagulation treatment   • Stroke (cerebrum) (CMS/HCC)       Therapy Treatment    IRF Treatment Summary     Row Name 19 1438 19 1130 19 1047       Evaluation/Treatment Time and Intent    Subjective Information  no complaints  -LH  no complaints  -KB  no complaints  -MD    Existing Precautions/Restrictions  fall  -LH  fall swallow, communication  -KB  fall  -MD    Document Type  therapy note (daily note)  -  therapy note (daily note)  -  therapy note (daily note)  -MD    Mode of Treatment  individual therapy;physical therapy  -  speech-language pathology  -KB  physical therapy  -MD    Patient/Family Observations  pt seated in  on unit no acute distress  -  seated in  in dining room; pleasant and cooperative throughout meal observation with test tray  -  Pt seated in WC at ns station showing no signs of acute distress.  -MD    Start Time (Evaluation/Treatment)  --  1130  -KB  --    Stop Time (Evaluation/Treatment)  --  1200  -KB  --    Recorded by [] Laura Foster, PT [] Bruton, Katherine L [MD] Mira Chadwick PT    Row Name 19 0930             Evaluation/Treatment Time and Intent    Subjective Information  no complaints  -KB      Existing Precautions/Restrictions  fall swallow, communication  -KB      Document Type  therapy note (daily  note)  -KB      Mode of Treatment  speech-language pathology  -KB      Patient/Family Observations  seated in wc at nurses station; agreeable to therapy  -KB      Start Time (Evaluation/Treatment)  0930  -KB      Stop Time (Evaluation/Treatment)  1000  -KB      Recorded by [KB] Bruton, Katherine L      Row Name 08/07/19 1438 08/07/19 1047          Cognition/Psychosocial- PT/OT    Affect/Mental Status (Cognitive)  unable/difficult to assess  -  --     Orientation Status (Cognition)  unable/difficult to assess  -LH  unable/difficult to assess  -MD     Follows Commands (Cognition)  follows one step commands;25-49% accuracy;initiation impaired;physical/tactile prompts required;repetition of directions required;verbal cues/prompting required;increased processing time needed  -LH  follows one step commands;0-24% accuracy;25-49% accuracy;increased processing time needed;physical/tactile prompts required;repetition of directions required;verbal cues/prompting required  -MD     Personal Safety Interventions  gait belt;fall prevention program maintained;supervised activity  -  fall prevention program maintained;gait belt  -MD     Cognitive Function (Cognitive)  attention deficit  -  --     Recorded by [] Laura Foster, PT [MD] Mira Chadwick PT     Row Name 08/07/19 1438             Bed Mobility Assessment/Treatment    Comment (Bed Mobility)  NT  -      Recorded by [] Laura Foster PT      Row Name 08/07/19 1438 08/07/19 1047          Sit-Stand Transfer    Sit-Stand Altha (Transfers)  contact guard;stand by assist  -  verbal cues;contact guard  -MD     Recorded by [] Laura Foster, PT [MD] Mira Chadwick PT     Row Name 08/07/19 1438 08/07/19 1047          Stand-Sit Transfer    Stand-Sit Altha (Transfers)  contact guard;stand by assist  -  contact guard;verbal cues  -MD     Recorded by [] Laura Foster, PT [MD] Mira Chadwick PT     Row Name 08/07/19 1438 08/07/19 1047          Gait/Stairs  Assessment/Training    [place] Level (Gait)  contact guard;stand by assist  -  verbal cues;contact guard  -MD     Distance in Feet (Gait)  220x2  -LH  200x2  -MD     Pattern (Gait)  step-through  -LH  step-through  -MD     Deviations/Abnormal Patterns (Gait)  festinating/shuffling;eliza decreased  -  base of support, narrow;festinating/shuffling;gait speed decreased;stride length decreased  -MD     Bilateral Gait Deviations  forward flexed posture;heel strike decreased  -  forward flexed posture  -MD     [place] Level (Stairs)  --  verbal cues;contact guard  -MD     Handrail Location (Stairs)  --  both sides  -MD     Number of Steps (Stairs)  --  4  -MD     Ascending Technique (Stairs)  --  step-over-step  -MD     Descending Technique (Stairs)  --  step-to-step  -MD     Comment (Gait/Stairs)  VCs for inc safe eliza and arm swing for improved ARACELIS and mechanics  -  --     Recorded by [] Laura Foster, PT [MD] Mira Chadwick PT     Row Name 08/07/19 1130 08/07/19 1047 08/07/19 0930       Pain Scale: Numbers Pre/Post-Treatment    Pain Scale: Numbers, Pretreatment  0/10 - no pain  -KB  0/10 - no pain  -MD  0/10 - no pain  -KB    Pain Scale: Numbers, Post-Treatment  0/10 - no pain  -KB  --  0/10 - no pain  -KB    Recorded by [KB] Bruton, Katherine L [MD] Mira Chadwick PT [KB] Bruton, Katherine L    Row Name 08/07/19 1438             Pain Scale: FACES Pre/Post-Treatment    Pain: FACES Scale, Pretreatment  0-->no hurt  -      Pain: FACES Scale, Post-Treatment  0-->no hurt  -      Recorded by [] Laura Foster PT      Row Name 08/07/19 1438             Static Standing Balance    Level of [place] (Supported Standing, Static Balance)  contact guard assist;standby assist  -      Time Able to Maintain Position (Supported Standing, Static Balance)  4 to 5 minutes  -      Comment (Supported Standing, Static Balance)  standing at table top, pt cued to point to numbers on cards- pt appears to have  approx 80% accuracy w prompting  -      Recorded by [] Laura Foster, PT      Row Name 08/07/19 1438             Standing Balance Activity    Activities Performed (Standing, Balance Training)  standing ball toss  -      Support Needed for Balance (Standing, Balance Training)  SBA;CGA;balances without upper extremity support  -      Progressive Balance Activity (Standing, Balance Training)  upper extremity activities added during activity;combined head and eye movements during activity  -      Comment (Standing, Balance Training)  reaching for rings multidirectional and tossing to dowel deepthi. bean bag toss. cueing/prompting req to complete task   -      Recorded by [] Laura Foster, PT      Row Name 08/07/19 1438 08/07/19 1047          Positioning and Restraints    Pre-Treatment Position  sitting in chair/recliner  -  sitting in chair/recliner  -MD     Post Treatment Position  wheelchair  -  wheelchair  -MD     In Wheelchair  sitting;exit alarm on;with OT  -  sitting;exit alarm on;patient within staff view  -MD     Recorded by [] Laura Foster, PT [MD] Mira Chadwick PT       User Key  (r) = Recorded By, (t) = Taken By, (c) = Cosigned By    Initials Name Effective Dates     Laura Foster, PT 04/03/18 -     Mira Vaca PT 04/03/18 -     KB Bruton, Katherine L 03/07/18 -         Wound 07/17/19 1015 Left neck incision (Active)   Dressing Appearance open to air 8/7/2019  8:45 AM   Closure Liquid skin adhesive 8/7/2019  8:45 AM   Base clean;dry 8/6/2019  9:59 PM   Drainage Amount none 8/7/2019  8:45 AM     Physical Therapy Education     Title: PT OT SLP Therapies (Not Started)     Topic: Physical Therapy (In Progress)     Point: Mobility training (In Progress)     Learning Progress Summary           Patient Nonacceptance, E,TB,D, NR by ENID at 8/5/2019 12:00 PM    Acceptance, E,D, NR by NESTOR at 8/3/2019  1:35 PM    Acceptance, D, DU,NR by NESTOR at 8/3/2019  8:56 AM                   Point: Home exercise  program (In Progress)     Learning Progress Summary           Patient Nonacceptance, E,TB,D, NR by ENID at 8/5/2019 12:00 PM                   Point: Precautions (In Progress)     Learning Progress Summary           Patient Acceptance, E, NR by MD at 8/6/2019 10:41 AM    Acceptance, E, NR by MD at 8/2/2019 10:44 AM    Acceptance, E,D, NR by MD at 8/1/2019 12:16 PM                               User Key     Initials Effective Dates Name Provider Type Discipline     04/03/18 -  Nicole Austin, PT Physical Therapist PT    MD 04/03/18 -  Mira Chadwick, PT Physical Therapist PT     04/03/18 -  Marycruz Schmitt PT Physical Therapist PT                  PT Recommendation and Plan                        Time Calculation:     PT Charges     Row Name 08/07/19 1444 08/07/19 1047          Time Calculation    Start Time  1400  -  1030  -MD     Stop Time  1430  -  1100  -MD     Time Calculation (min)  30 min  -  30 min  -MD     PT Received On  --  08/07/19  -MD     PT - Next Appointment  --  08/08/19  -MD       User Key  (r) = Recorded By, (t) = Taken By, (c) = Cosigned By    Initials Name Provider Type     Laura Foster, PT Physical Therapist    MD Mira Chadwick, PT Physical Therapist          Therapy Charges for Today     Code Description Service Date Service Provider Modifiers Qty    13662828988 HC PT THER PROC EA 15 MIN 8/7/2019 Laura Foster, PT GP 2                   Laura Foster, PT  8/7/2019

## 2019-08-07 NOTE — PROGRESS NOTES
Inpatient Rehabilitation Plan of Care Note    Plan of Care  Care Plan Reviewed - No updates at this time.    Psychosocial    Performed Intervention(s)  Assist patient to express needs and concerns      Safety    Performed Intervention(s)  Bed alarm, wc alarm  Safety rounds  Items within reach      Body Systems    Performed Intervention(s)  Daily skin inspection      Sphincter Control    Performed Intervention(s)  Monitor intake and output  Encourage fluid intake  Elimination schedule    Signed by: Di Saldaña RN

## 2019-08-07 NOTE — PLAN OF CARE
Problem: Fall Risk (Adult)  Goal: Absence of Fall  Outcome: Ongoing (interventions implemented as appropriate)   08/07/19 1435   Fall Risk (Adult)   Absence of Fall making progress toward outcome       Problem: Patient Care Overview  Goal: Plan of Care Review  Outcome: Ongoing (interventions implemented as appropriate)   08/07/19 1435   Patient Care Overview   IRF Plan of Care Review progress ongoing, continue   Progress, Functional Goals demonstrating adequate progress   Coping/Psychosocial   Plan of Care Reviewed With patient   OTHER   Outcome Summary Global aphasia. exp worse than receptive. no pain. crushing meds in pure. elevating diet to fork mash per speech therapy. very impulsive       Problem: Stroke (IRF) (Adult)  Goal: Promote Optimal Functional [place]  Outcome: Ongoing (interventions implemented as appropriate)   08/07/19 1435   Stroke (IRF) (Adult)   Promote Optimal Functional [place] demonstrating adequate progress

## 2019-08-07 NOTE — PROGRESS NOTES
LOS: 7 days   Patient Care Team:  Eric Matta MD as PCP - General (General Practice)    Chief Complaint:     Status post left CVA with aphasia and spastic right hemiparesis  Dysphagia-Cortrak tube removed on July 31 and start on puréed/honey thick liquid  History of multiple bilateral strokes and left central retinal artery occlusion  History of left greater than right internal carotid artery stenosis-status post left internal carotid artery stent July 17, 2019  History of hypertension  History of diabetes mellitus  Impulsivity-room close to nursing station-bed alarm  Anemia  Vitamin D deficiency        Subjective     History of Present Illness    Subjective  She continues with aphasia.  Does not indicate any complaint of headache.  No acute changes noted in her strength on the right side.  Blood sugar still are elevated       History taken from: patient chart RN    Objective     Vital Signs  Temp:  [97.5 °F (36.4 °C)-98.6 °F (37 °C)] 98.6 °F (37 °C)  Heart Rate:  [66-72] 68  Resp:  [16-18] 18  BP: (122-146)/(60-76) 128/60    Objective  Physical Exam  MENTAL STATUS -  AWAKE / ALERT  HEENT- NCAT,   SCLERA NON-ICTERIC, CONJUNCTIVA PINK, OP MOIST, NO JVD, EARS UNREMARKABLE EXTERNALLY  LUNGS - CTA, NO WHEEZES, RALES OR RHONCHI  HEART- RRR, NO RUB, MURMUR, OR GALLOP  ABD - NORMOACTIVE BOWEL SOUNDS, SOFT, NT.    EXT - NO EDEMA OR CYANOSIS  NEURO -alert.  Aphasia.  She will get occasional single word    Does not follow commands.        MOTOR EXAM -patient moves the left side more than the right but takes resistance bilaterally         Results Review:     I reviewed the patient's new clinical results.  Glucose   Date/Time Value Ref Range Status   08/07/2019 1621 101 70 - 130 mg/dL Final   08/07/2019 1106 242 (H) 70 - 130 mg/dL Final   08/07/2019 0721 185 (H) 70 - 130 mg/dL Final   08/06/2019 2036 238 (H) 70 - 130 mg/dL Final   08/06/2019 1701 130 70 - 130 mg/dL Final   08/06/2019 1620 49 (C) 70 - 130 mg/dL Final    08/06/2019 1141 288 (H) 70 - 130 mg/dL Final   08/06/2019 0737 230 (H) 70 - 130 mg/dL Final     Results from last 7 days   Lab Units 08/07/19  0801 08/05/19  0726 08/03/19  0746   WBC 10*3/mm3 7.17 5.40 8.60   HEMOGLOBIN g/dL 9.6* 9.8* 9.7*   HEMATOCRIT % 31.3* 30.8* 31.1*   PLATELETS 10*3/mm3 374 419 448       Ref. Range 5/22/2019 05:44 5/22/2019 11:34 7/9/2019 08:25 7/18/2019 04:49 7/19/2019 05:05 7/23/2019 05:56 8/1/2019 06:48   Hemoglobin Latest Ref Range: 12.0 - 15.9 g/dL 10.8 (L)  10.6 (L) 8.5 (L) 9.7 (L) 9.3 (L) 7.4 (L)   Hematocrit Latest Ref Range: 34.0 - 46.6 % 35.1  33.4 (L) 26.2 (L) 29.9 (L) 28.6 (L) 23.6 (L)     Results from last 7 days   Lab Units 08/07/19  0801 08/05/19  0726 08/02/19  0720 08/01/19  0647   SODIUM mmol/L 139 136 146* 139   POTASSIUM mmol/L 3.6 3.4* 3.7 3.7   CHLORIDE mmol/L 99 99 105 101   CO2 mmol/L 27.5 27.3 29.3* 29.1*   BUN mg/dL 14 18 30* 23   CREATININE mg/dL 0.69 0.87 0.92 0.69   CALCIUM mg/dL 8.9 9.1 8.8 9.0   BILIRUBIN mg/dL  --   --   --  0.3   ALK PHOS U/L  --   --   --  107   ALT (SGPT) U/L  --   --   --  31   AST (SGOT) U/L  --   --   --  32   GLUCOSE mg/dL 201* 276* 193* 123*         Ref. Range 8/1/2019 06:47   25 Hydroxy, Vitamin D Latest Ref Range: 30.0 - 100.0 ng/ml 21.8 (L)       Ref. Range 8/1/2019 06:47   Total Cholesterol Latest Ref Range: 0 - 200 mg/dL 105   HDL Cholesterol Latest Ref Range: 40 - 60 mg/dL 40   LDL Cholesterol  Latest Ref Range: 0 - 100 mg/dL 57   VLDL Cholesterol Latest Ref Range: 5 - 40 mg/dL 8   Triglycerides Latest Ref Range: 0 - 150 mg/dL 40   Medication Review: done  Scheduled Meds:    amantadine 100 mg Oral QAM   apixaban 5 mg Oral Q12H   aspirin 325 mg Oral Daily   atorvastatin 80 mg Oral Nightly   brimonidine 1 drop Both Eyes BID   dorzolamide 1 drop Both Eyes BID   glipiZIDE 5 mg Oral BID AC   insulin regular 0-14 Units Subcutaneous TID AC   lansoprazole 30 mg Oral BID AC   losartan-HCTZ (HYZAAR) 100-12.5 combo dose  Oral Daily    [START ON 8/8/2019] metFORMIN 850 mg Oral BID With Meals   nebivolol 20 mg Oral BID   nystatin 5 mL Swish & Spit 4x Daily   vitamin D 50,000 Units Oral Q7 Days     Continuous Infusions:   PRN Meds:.•  aluminum-magnesium hydroxide-simethicone  •  dextrose  •  dextrose  •  glucagon (human recombinant)  •  nitroglycerin      Assessment/Plan       Stroke (cerebrum) (CMS/HCC)      Assessment & Plan  Status post left CVA with aphasia and spastic right hemiparesis    Neuro stimulation-amantadine added July 27    Dysphagia-Cortrak feeding tube discontinued on July 31 and started on puréed/honey thick liquid diet with strategies  August 7-advance to "[employer] mash nectar thick liquid diet, consistent carbohydrate.    History of multiple bilateral strokes and left central retinal artery occlusion    History of left greater than right internal carotid artery stenosis-status post left internal carotid artery stent July 17, 2019    History of hypertension - Hyzaar/ nebivolol    History of diabetes mellitus -Lantus/glipizide (home medication metformin 1000 mg twice daily and glipizide 10 mg twice daily)  August 1-blood sugars were low yesterday afternoon after tube feeds discontinued.  Held glipizide last night and this morning and Lantus last night.  Blood sugar elevated at noontime today.  Will receive resume glipizide at a lower dose of 5 mg twice a day with meals.  Continue sliding scale insulin coverage.  She also is on metformin at home.  August 5-add back metformin initially at 500 mg twice a day and continue glipizide 5 mg twice a day, both one half of previous home dose, titrate up as needed.  August 7-titrate up metformin to 850 mg twice a day.  And consistent carbohydrate restriction to diet.    Stroke prophylaxis-aspirin/Eliquis/atorvastatin    Anemia-August 1-hemoglobin 7.4.  Most recent check on July 23 HGB 9.3.  Will recheck hemoglobin this afternoon.  Hemoccult stool.  Iron studies.  Reticulocyte count.  Recheck CBC  in the a.m.  Added Protonix.  Patient is on aspirin and Eliquis.  With recent stroke  will look to transfuse packed red blood cell.  August 2-hemoglobin improved 9.0-1 unit packed red blood cells.  Elevated reticulocyte count in response to anemia.  Nursing describes dark stool.  Patient discussed with gastroenterology.  At this point unable to do endoscopy as on Eliquis and aspirin.  Will treat symptomatically for now with increasing proton pump inhibitor and transfusing as needed.  August 5-anemia improved-hemoglobin 9.8    DVT prophylaxis-SCDs/anticoagulation    Impulsivity-   Nursing to do re-orientation with the patient.  Room close to the nursing station.    Endocrine-vitamin D deficiency-ergocalciferol 50,000 units weekly x8 weeks added. Vitamin B12 level and TSH checked and late May unremarkable     Impaired cognition/impaired language, impaired swallow, impaired activity daily living, impaired mobility     Now admit for comprehensive acute inpatient rehabilitation .  This would be an interdisciplinary program with physical therapy 1 hour,  occupational therapy 1 hour, and speech therapy 1 hour, 5 days a week.  Rehabilitation nursing for carryover, monitoring of cardiopulmonary and neurologic   status, bowel and bladder, and skin  Ongoing physician follow-up.  Weekly team conferences.  Goals are indeterminant.   Rehabilitation prognosis determined.  Medical prognosis determined.  Estimated length of stay is indeterminate.    TEAM CONF - AUGUST 6- TRANFERS CTG. GAIT UP  FEET CTG-MIN ASSIST. UBD MIN. LBD MOD. BATH MOD. SEVERE APHASIA. INCREASED RESISTANCE TO PARTICIPATE AT TIMES.   PUREE/HTL.  GOOD PO INTAKE. ADJUSTING MEDS FOR DIABETES MELLITUS.  BLADDER CONTINENT/INCONTIENT.   ELOS- 2 WEEKS.       Ajit Howard MD  08/07/19  7:09 PM    Time:

## 2019-08-07 NOTE — PROGRESS NOTES
Inpatient Rehabilitation Functional Measures Assessment    Functional Measures  [facility] Eating:  [facility] Grooming: [facility] Bathing:  [facility] Upper Body Dressing:  [facility] Lower Body Dressing:  [facility] Toileting:  [facility] Bladder Management  Level of Assistance:  [place]  Frequency/Number of Accidents this Shift:  [facility] Bowel Management  Level of Assistance: [place]  Frequency/Number of Accidents this Shift: [facility] Bed/Chair/Wheelchair Transfer:  Activity was not observed.  [facility] Toilet Transfer:  [facility] Tub/Shower Transfer:  [place]    Previously Documented Mode of Locomotion at Discharge: Field  KATI Expected Mode of Locomotion at Discharge: [facility] Walk/Wheelchair:  WHEELCHAIR OBSERVATION   Activity was not observed.    WALK OBSERVATION   Walk Distance Scale = 3.  Distance walked is greater than 150 feet. Walk Score  = 4.  Patient performs 75% or more of effort and requires minimal assistance.  Incidental help/contact guard/steadying was provided. Patient walked a distance  of  200 feet. No assistive devices were required.  KATI Stairs:  Stairs Score = 2.  Incidental assistance with lifting or lowering,  contact guard or steadying was provided. Patient performs 75% or more of effort  and requires minimal contact assistance. Patient negotiated  4 stairs. Patient  requires the following assistive device(s): Handrail(s).    KATI Comprehension:  [place]  [facility] Expression:  [facility] Social Interaction:  [facility] Problem Solving:  [facility] Memory:  [place]    Therapy Mode Minutes  Occupational Therapy: [place]  Physical Therapy: Individual: 60 minutes.  Speech Language Pathology:  [place]    Signed by: Mira Chadwick, PT

## 2019-08-07 NOTE — PROGRESS NOTES
Inpatient Rehabilitation Plan of Care Note    Plan of Care  Care Plan Reviewed - No updates at this time.    Body Systems    [RN] Integumentary(Active)  Current Status(08/07/2019): Incision L neck glued  Weekly Goal(08/14/2019): No S&S infection noted  Discharge Goal: No S&S infection noted    Performed Intervention(s)  Daily skin inspection      Psychosocial    [RN] Coping/Adjustment(Active)  Current Status(08/07/2019): Pt. is non-verbal; Supportive   Weekly Goal(08/14/2019): Identify progress in functional status  Discharge Goal: Demonstrate healthy coping strategies        Safety    [RN] Potential for Injury(Active)  Current Status(08/07/2019): Impulsive; aphasic; does not use call light  Weekly Goal(08/14/2019): Cue to use call light  Discharge Goal: Pt/family will be aware of risk of fall and safety in the home  setting    Performed Intervention(s)  Bed alarm, wc alarm  Safety rounds      Sphincter Control    [RN] Bladder Management(Active)  Current Status(08/07/2019): incontinent bladder 100%  Weekly Goal(08/14/2019): continent 50%  Discharge Goal: continent 100%    [RN] Bowel Management(Active)  Current Status(08/07/2019): incontinent bowel 100%  Weekly Goal(08/14/2019): continent bowel 50%  Discharge Goal: continent bowel 100%    Signed by: Rogers Erickson RN

## 2019-08-07 NOTE — THERAPY TREATMENT NOTE
Inpatient Rehabilitation - Occupational Therapy Treatment Note    [facility]     Patient Name: Darby Workman  : 1945  MRN: [id number]    Today's Date: 2019                 Admit Date: 2019      Visit Dx:  No diagnosis found.    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)   • Right-sided headache   • Acute ischemic stroke (CMS/HCC)   • Embolic stroke (CMS/HCC)   • Cerebral infarction due to stenosis of left carotid artery (CMS/HCC)   • Anticoagulated by anticoagulation treatment   • Stroke (cerebrum) (CMS/HCC)         Therapy Treatment    IRF Treatment Summary     Row Name 19 1543 19 1438 19 1130       Evaluation/Treatment Time and Intent    Subjective Information  no complaints  -AF  no complaints  -LH  no complaints  -KB    Existing Precautions/Restrictions  fall  -AF  fall  -LH  fall swallow, communication  -KB    Document Type  therapy note (daily note)  -AF  therapy note (daily note)  -  therapy note (daily note)  -KB    Mode of Treatment  occupational therapy  -AF  individual therapy;physical therapy  -LH  speech-language pathology  -KB    Patient/Family Observations  pt had stool in her brief during each session today, pt is not able to ask to go to the bathroom and would benefit from a toileting schedule, sitting up in w/c in room with CNA, and in therapy gym in PM with PT  -AF  pt seated in WC on unit no acute distress  -LH  seated in wc in dining room; pleasant and cooperative throughout meal observation with test tray  -KB    Start Time (Evaluation/Treatment)  --  --  1130  -KB    Stop Time (Evaluation/Treatment)  --  --  1200  -KB    Recorded by [AF] Ingris Ansari, OTR [LH] Laura Foster, PT [KB] Bruton, Katherine L    Row Name 19 1047 19 0930          Evaluation/Treatment  Time and Intent    Subjective Information  no complaints  -MD  no complaints  -KB     Existing Precautions/Restrictions  fall  -MD  fall swallow, communication  -KB     Document Type  therapy note (daily note)  -MD  therapy note (daily note)  -KB     Mode of Treatment  physical therapy  -MD  speech-language pathology  -KB     Patient/Family Observations  Pt seated in WC at nsg station showing no signs of acute distress.  -MD  seated in wc at nurses station; agreeable to therapy  -KB     Start Time (Evaluation/Treatment)  --  0930  -KB     Stop Time (Evaluation/Treatment)  --  1000  -KB     Recorded by [MD] Mira Chadwick, PT [KB] Bruton, Katherine L     Row Name 08/07/19 1543 08/07/19 1438 08/07/19 1047       Cognition/Psychosocial- PT/OT    Affect/Mental Status (Cognitive)  unable/difficult to assess  -AF  unable/difficult to assess  -LH  --    Orientation Status (Cognition)  unable/difficult to assess  -AF  unable/difficult to assess  -LH  unable/difficult to assess  -MD    Follows Commands (Cognition)  follows one step commands;25-49% accuracy;0-24% accuracy;delayed response/completion;increased processing time needed;initiation impaired;repetition of directions required;verbal cues/prompting required  -AF  follows one step commands;25-49% accuracy;initiation impaired;physical/tactile prompts required;repetition of directions required;verbal cues/prompting required;increased processing time needed  -LH  follows one step commands;0-24% accuracy;25-49% accuracy;increased processing time needed;physical/tactile prompts required;repetition of directions required;verbal cues/prompting required  -MD    Personal Safety Interventions  fall prevention program maintained;gait belt;nonskid shoes/slippers when out of bed  -AF  gait belt;fall prevention program maintained;supervised activity  -LH  fall prevention program maintained;gait belt  -MD    Cognitive Function (Cognitive)  --  attention deficit  -LH  --    Attention  Deficit (Cognitive)  severe deficit  -AF  --  --    Memory Deficit (Cognitive)  unable/difficult to assess  -AF  --  --    Safety Deficit (Cognitive)  severe deficit;awareness of need for assistance;impulsivity;insight into deficits/self awareness  -AF  --  --    Recorded by [AF] Ingris Ansari, OTR [] Laura Foster, PT [MD] Mira Chadwick, PT    Row Name 08/07/19 1438             Bed Mobility Assessment/Treatment    Comment (Bed Mobility)  NT  -LH      Recorded by [] Laura Foster, PT      Row Name 08/07/19 1543             Functional Mobility    Functional Mobility- Comment  hand held assistance with walking in the bathroom  -AF      Recorded by [AF] Ingris Ansari OTKEVIN      Row Name 08/07/19 1438 08/07/19 1047          Sit-Stand Transfer    Sit-Stand [place] (Transfers)  contact guard;stand by assist  -  verbal cues;contact guard  -MD     Recorded by [] Laura Foster, PT [MD] Mira Chadwick, PT     Row Name 08/07/19 1438 08/07/19 1047          Stand-Sit Transfer    Stand-Sit [place] (Transfers)  contact guard;stand by assist  -  contact guard;verbal cues  -MD     Recorded by [] Laura Foster, PT [MD] Mira Chadwick, PT     Row Name 08/07/19 1543             Toilet Transfer    Type (Toilet Transfer)  stand pivot/stand step  -AF      [place] Level (Toilet Transfer)  contact guard;verbal cues  -AF      Assistive Device (Toilet Transfer)  commode;grab bars/safety frame;wheelchair in AM and PM sessions  -AF      Recorded by [AF] Ingris Ansari OTR      Row Name 08/07/19 1438 08/07/19 1047          Gait/Stairs Assessment/Training    [place] Level (Gait)  contact guard;stand by assist  -  verbal cues;contact guard  -MD     Distance in Feet (Gait)  220x2  -  200x2  -MD     Pattern (Gait)  step-through  -  step-through  -MD     Deviations/Abnormal Patterns (Gait)  festinating/shuffling;eliza decreased  -  base of support, narrow;festinating/shuffling;gait speed decreased;stride  length decreased  -MD     Bilateral Gait Deviations  forward flexed posture;heel strike decreased  -LH  forward flexed posture  -MD     [place] Level (Stairs)  --  verbal cues;contact guard  -MD     Handrail Location (Stairs)  --  both sides  -MD     Number of Steps (Stairs)  --  4  -MD     Ascending Technique (Stairs)  --  step-over-step  -MD     Descending Technique (Stairs)  --  step-to-step  -MD     Comment (Gait/Stairs)  VCs for inc safe eliza and arm swing for improved ARACELIS and mechanics  -  --     Recorded by [LH] Laura Foster, PT [MD] Mira Chadwick, HESHAM     Row Name 08/07/19 1543             Bathing Assessment/Treatment    Bathing [place] Level  bathing skills underarms, assist with davey area  -AF      Bathing Position  supported sitting;supported standing  -AF      Comment (Bathing)  at sink  -AF      Recorded by [AF] Ingris Ansari OTR      Row Name 08/07/19 1543             Upper Body Dressing Assessment/Treatment    Upper Body Dressing Task  upper body dressing skills;set up assistance  -AF      Upper Body Dressing Position  supported sitting  -AF      Comment (Upper Body Dressing)  with pull over shirt  -AF      Recorded by [AF] Ingris Ansari OTR      Row Name 08/07/19 1543             Lower Body Dressing Assessment/Treatment    Lower Body Dressing [place] Level  don;pants/bottoms;shoes/slippers;underwear;moderate assist (50% patient effort);verbal cues  -AF      Lower Body Dressing Position  supported sitting;supported standing  -AF      Comment (Lower Body Dressing)  difficulty with fasteners  -AF      Recorded by [AF] Ingris Ansari OTR      Row Name 08/07/19 1543             Grooming Assessment/Treatment    Grooming [place] Level  grooming skills;minimum assist (75% patient effort);verbal cues  -AF      Grooming Position  supported sitting;sink side  -AF      Recorded by [AF] Ingris Ansari OTKEVIN      Row Name 08/07/19 1543             Toileting  Assessment/Treatment    Toileting [place] Level  toileting skills;maximum assist (25% patient effort);verbal cues  -AF      Assistive Device Use (Toileting)  grab bar/safety frame;raised toilet seat  -AF      Toileting Position  unsupported sitting;supported standing  -AF      Comment (Toileting)  unable to fasten/unfasten pants  -AF      Recorded by [AF] Ingris Ansari OTR      Row Name 08/07/19 1543             Vision Assessment/Intervention    Vision Assessment Comment  pt participated in completing butterfly shaped puzzle with min vc's, attended during entire task and required assistance for 2 pieces, increased time  -AF      Recorded by [AF] Ingris Ansari OTR      Row Name 08/07/19 1130 08/07/19 1047 08/07/19 0930       Pain Scale: Numbers Pre/Post-Treatment    Pain Scale: Numbers, Pretreatment  0/10 - no pain  -KB  0/10 - no pain  -MD  0/10 - no pain  -KB    Pain Scale: Numbers, Post-Treatment  0/10 - no pain  -KB  --  0/10 - no pain  -KB    Recorded by [KB] Bruton, Katherine L [MD] Mira Chadwick, PT [KB] Bruton, Katherine L    Row Name 08/07/19 1543 08/07/19 1438          Pain Scale: FACES Pre/Post-Treatment    Pain: FACES Scale, Pretreatment  0-->no hurt  -AF  0-->no hurt  -LH     Pain: FACES Scale, Post-Treatment  0-->no hurt  -AF  0-->no hurt  -LH     Recorded by [AF] Ingris Ansari, OTR [LH] Laura Foster, PT     Row Name 08/07/19 1438             Static Standing Balance    Level of [place] (Supported Standing, Static Balance)  contact guard assist;standby assist  -      Time Able to Maintain Position (Supported Standing, Static Balance)  4 to 5 minutes  -      Comment (Supported Standing, Static Balance)  standing at table top, pt cued to point to numbers on cards- pt appears to have approx 80% accuracy w prompting  -LH      Recorded by [LH] Laura Foster, PT      Row Name 08/07/19 1438             Standing Balance Activity    Activities Performed (Standing, Balance Training)   standing ball toss  -      Support Needed for Balance (Standing, Balance Training)  SBA;CGA;balances without upper extremity support  -      Progressive Balance Activity (Standing, Balance Training)  upper extremity activities added during activity;combined head and eye movements during activity  -      Comment (Standing, Balance Training)  reaching for rings multidirectional and tossing to dowel deepthi. bean bag toss. cueing/prompting req to complete task   -      Recorded by [] Laura Foster, PT      Row Name 08/07/19 1543 08/07/19 1438 08/07/19 1047       Positioning and Restraints    Pre-Treatment Position  sitting in chair/recliner  -AF  sitting in chair/recliner  -  sitting in chair/recliner  -MD    Post Treatment Position  wheelchair  -AF  wheelchair  -  wheelchair  -MD    In Wheelchair  sitting;exit alarm on;with nsg;patient within staff view in AM and PM sessions   -  sitting;exit alarm on;with OT  -  sitting;exit alarm on;patient within staff view  -MD    Recorded by [AF] Ingris Ansari, OTR [] Laura Foster, PT [MD] Mira Chadwick, HESHAM      User Key  (r) = Recorded By, (t) = Taken By, (c) = Cosigned By    Initials Name Effective Dates     Laura Foster, PT 04/03/18 -     Ingris Youssef, OTR 04/03/18 -     Mira Vaca PT 04/03/18 -     KB Bruton, Katherine L 03/07/18 -           Wound 07/17/19 1015 Left neck incision (Active)   Dressing Appearance open to air 8/7/2019  8:45 AM   Closure Liquid skin adhesive 8/7/2019  8:45 AM   Base clean;dry 8/6/2019  9:59 PM   Drainage Amount none 8/7/2019  8:45 AM         OT Recommendation and Plan    Anticipated Equipment Needs At Discharge (OT Eval): bathing equipment  Planned Therapy Interventions (OT Eval): activity tolerance training, BADL retraining, cognitive/visual perception retraining, functional balance retraining, neuromuscular control/coordination retraining, occupation/activity based interventions, patient/caregiver  education/training, ROM/therapeutic exercise, strengthening exercise, transfer/mobility retraining            OT IRF GOALS     Row Name 08/01/19 1159             Transfer Goal 1 (OT-IRF)    Activity/Assistive Device (Transfer Goal 1, OT-IRF)  toilet;shower chair;walk-in shower  -AF      [place] Level (Transfer Goal 1, OT-IRF)  verbal cues required;minimum assist (75% or more patient effort);contact guard assist  -AF      Time Frame (Transfer Goal 1, OT-IRF)  short term goal (STG)  -AF      Progress/Outcomes (Transfer Goal 1, OT-IRF)  goal ongoing  -AF         Transfer Goal 2 (OT-IRF)    Activity/Assistive Device (Transfer Goal 2, OT-IRF)  shower chair;walk-in shower;toilet  -AF      [place] Level (Transfer Goal 2, OT-IRF)  contact guard assist;verbal cues required  -AF      Time Frame (Transfer Goal 2, OT-IRF)  long term goal (LTG)  -AF      Progress/Outcomes (Transfer Goal 2, OT-IRF)  goal ongoing  -AF         Bathing Goal 1 (OT-IRF)    Activity/Device (Bathing Goal 1, OT-IRF)  bathing skills, all;grab bar/tub rail;hand-held shower spray hose;shower chair  -AF      [place] Level (Bathing Goal 1, OT-IRF)  minimum assist (75% or more patient effort);verbal cues required  -AF      Time Frame (Bathing Goal 1, OT-IRF)  short term goal (STG)  -AF      Progress/Outcomes (Bathing Goal 1, OT-IRF)  goal ongoing  -AF         Bathing Goal 2 (OT-IRF)    Activity/Device (Bathing Goal 2, OT-IRF)  bathing skills, all;grab bar/tub rail;hand-held shower spray hose;shower chair  -AF      [place] Level (Bathing Goal 2, OT-IRF)  contact guard assist;verbal cues required  -AF      Time Frame (Bathing Goal 2, OT-IRF)  long term goal (LTG)  -AF      Progress/Outcomes (Bathing Goal 2, OT-IRF)  goal ongoing  -AF         UB Dressing Goal 1 (OT-IRF)    Activity/Device (UB Dressing Goal 1, OT-IRF)  upper body dressing  -AF      [place] (UB Dress Goal 1, OT-IRF)  set-up required  -AF      Time Frame (UB Dressing Goal 1,  OT-IRF)  long term goal (LTG)  -AF      Progress/Outcomes (UB Dressing Goal 1, OT-IRF)  goal ongoing  -AF         LB Dressing Goal 1 (OT-IRF)    Activity/Device (LB Dressing Goal 1, OT-IRF)  lower body dressing  -AF      [place] (LB Dressing Goal 1, OT-IRF)  moderate assist (50-74% patient effort);verbal cues required  -AF      Time Frame (LB Dressing Goal 1, OT-IRF)  short term goal (STG)  -AF      Progress/Outcomes (LB Dressing Goal 1, OT-IRF)  goal ongoing  -AF         LB Dressing Goal 2 (OT-IRF)    Activity/Device (LB Dressing Goal 2, OT-IRF)  lower body dressing  -AF      [place] (LB Dressing Goal 2, OT-IRF)  contact guard assist;verbal cues required  -AF      Time Frame (LB Dressing Goal 2, OT-IRF)  long term goal (LTG)  -AF      Progress/Outcomes (LB Dressing Goal 2, OT-IRF)  goal ongoing  -AF         Grooming Goal 1 (OT-IRF)    Activity/Device (Grooming Goal 1, OT-IRF)  grooming skills, all  -AF      [place] (Grooming Goal 1, OT-IRF)  minimum assist (75% or more patient effort);verbal cues required  -AF      Time Frame (Grooming Goal 1, OT-IRF)  short term goal (STG)  -AF      Progress/Outcomes (Grooming Goal 1, OT-IRF)  goal ongoing  -AF         Grooming Goal 2 (OT-IRF)    Activity/Device (Grooming Goal 2, OT-IRF)  grooming skills, all  -AF      [place] (Grooming Goal 2, OT-IRF)  supervision required;verbal cues required  -AF      Time Frame (Grooming Goal 2, OT-IRF)  long term goal (LTG)  -AF      Progress/Outcomes (Grooming Goal 2, OT-IRF)  goal ongoing  -AF         Toileting Goal 1 (OT-IRF)    Activity/Device (Toileting Goal 1, OT-IRF)  toileting skills, all;grab bar/safety frame;raised toilet seat  -AF      [place] Level (Toileting Goal 1, OT-IRF)  moderate assist (50-74% patient effort);verbal cues required  -AF      Time Frame (Toileting Goal 1, OT-IRF)  short term goal (STG)  -AF      Progress/Outcomes (Toileting Goal 1, OT-IRF)  goal ongoing  -AF         Toileting Goal 2  (OT-IRF)    Activity/Device (Toileting Goal 2, OT-IRF)  toileting skills, all;grab bar/safety frame;raised toilet seat  -AF      [place] Level (Toileting Goal 2, OT-IRF)  verbal cues required;contact guard assist  -AF      Time Frame (Toileting Goal 2, OT-IRF)  long term goal (LTG)  -AF      Progress/Outcomes (Toileting Goal 2, OT-IRF)  goal ongoing  -AF         Caregiver Training Goal 1 (OT-IRF)    Caregiver Training Goal 1 (OT-IRF)  Family and patient to demo safe technique with ADLs, transfers, HEP and adaptive equipment as needed   -AF      Time Frame (Caregiver Training Goal 1, OT-IRF)  long term goal (LTG)  -AF      Progress/Outcomes (Caregiver Training Goal 1, OT-IRF)  goal ongoing  -AF        User Key  (r) = Recorded By, (t) = Taken By, (c) = Cosigned By    Initials Name Provider Type    AF Ingris Ansari OTR Occupational Therapist                     Time Calculation:     Time Calculation- OT     Row Name 08/07/19 1548 08/07/19 1545          Time Calculation- OT    OT Start Time  1430  -AF  0830  -AF     OT Stop Time  1500  -AF  0900  -AF     OT Time Calculation (min)  30 min  -AF  30 min  -AF       User Key  (r) = Recorded By, (t) = Taken By, (c) = Cosigned By    Initials Name Provider Type    AF Ingris Ansari OTR Occupational Therapist          Therapy Charges for Today     Code Description Service Date Service Provider Modifiers Qty    50869484627 HC OT SELF CARE/MGMT/TRAIN EA 15 MIN 8/6/2019 Ingris Ansari OTR GO 3    79631107297 HC OT NEUROMUSC RE EDUCATION EA 15 MIN 8/6/2019 Ingris Ansari OTR GO 1    55212362465 HC OT SELF CARE/MGMT/TRAIN EA 15 MIN 8/7/2019 Ingris Ansari OTR GO 3    82313889154 HC OT NEUROMUSC RE EDUCATION EA 15 MIN 8/7/2019 Ingris Ansari OTR GO 1                   JOHN Mejia  8/7/2019

## 2019-08-07 NOTE — PROGRESS NOTES
Inpatient Rehabilitation Functional Measures Assessment    Functional Measures  KATI Eating:  [facility] Grooming: [facility] Bathing:  [facility] Upper Body Dressing:  [facility] Lower Body Dressing:  [facility] Toileting:  [facility] Bladder Management  Level of Assistance:  [place]  Frequency/Number of Accidents this Shift:  [facility] Bowel Management  Level of Assistance: [place]  Frequency/Number of Accidents this Shift: [facility] Bed/Chair/Wheelchair Transfer:  [facility] Toilet Transfer:  [facility] Tub/Shower Transfer:  [place]    Previously Documented Mode of Locomotion at Discharge: [place]  KATI Expected Mode of Locomotion at Discharge: [facility] Walk/Wheelchair:  [facility] Stairs:  [facility] Comprehension:  Auditory comprehension is the usual mode. Patient does not  comprehend complex/abstract information in their primary language without  assistance from a helper. Comprehension Score = 3, Moderate Prompting. Patient  comprehends basic daily needs or ideas 50-74% of the time. Patient requires  moderate/some prompting. No assistive devices were required.  KATI Expression:  Vocal expression is the usual mode. Patient does not express  complex/abstract information in their primary language without a helper.  Expression Score = 2, Maximal Prompting. Patient expresses basic daily needs  25-49% of the time. Patient uses only single words or gestures and requires  maximal/a lot of prompting (most of the time). No assistive devices were  required.  KATI Social Interaction:  Social Interaction Score = 6, Modified Independent.  Patient is modified independent for social interaction, requiring: Requires  additional time.  KATI Problem Solving:  Patient does not make appropriate decisions in order to  solve complex problems without assistance from a helper. Problem Solving Score =  2, Maximal Direction. Patient makes appropriate decisions in order to solve  routine problems 25-49% of the time.  Patient requires maximal direction for the  following behavior(s): Decreased awareness of performance. Difficulty completing  tasks. Difficulty with self-monitoring. Impulsivity. Poor judgment. Inability to  follow simple commands.  KATI Memory:  Activity was not observed.    Therapy Mode Minutes  Occupational Therapy: Branch  Physical Therapy: Branch  Speech Language Pathology:  [facility]    Signed by: Di Saldaña RN

## 2019-08-07 NOTE — PROGRESS NOTES
Inpatient Rehabilitation Functional Measures Assessment    Functional Measures  KATI Eating:  [facility] Grooming: [facility] Bathing:  [facility] Upper Body Dressing:  [facility] Lower Body Dressing:  [facility] Toileting:  [facility] Bladder Management  Level of Assistance:  [place]  Frequency/Number of Accidents this Shift:  [facility] Bowel Management  Level of Assistance: [place]  Frequency/Number of Accidents this Shift: [facility] Bed/Chair/Wheelchair Transfer:  [facility] Toilet Transfer:  [facility] Tub/Shower Transfer:  [place]    Previously Documented Mode of Locomotion at Discharge: Field  KATI Expected Mode of Locomotion at Discharge: [facility] Walk/Wheelchair:  [facility] Stairs:  [facility] Comprehension:  Auditory comprehension is the usual mode. Comprehension  Score = 6, Modified [place].  Patient comprehends complex/abstract  information in their primary language with only mild difficulty.  KATI Expression:  Non-vocal expression is the usual mode. Patient does not  express complex/abstract information in their primary language without a helper.  Expression Score = 2, Maximal Prompting. Patient expresses basic daily needs  25-49% of the time. Patient uses only single words or gestures and requires  maximal/a lot of prompting (most of the time). Patient requires the following  assistive device(s): severe globl aphasia, especially expressive .  KATI Social Interaction:  Social Interaction Score = 6, Modified Independent.  Patient is modified independent for social interaction, occasionally losing  control, but self-corrects.  KATI Problem Solving:  Patient does not make appropriate decisions in order to  solve complex problems without assistance from a helper. Problem Solving Score =  3, Moderate Direction. Patient makes appropriate decisions in order to solve  routine problems 50-74% of the time. Patient requires moderate/some direction  for the following behavior(s): Difficulty  weighing alternatives/making choices.  KATI Memory:  Memory Score = 3, Moderate Prompting. Patient recognizes and  remembers 50-74% of the time. Patient requires moderate/some prompting  for  memory for the following: Inability to follow multi-step commands. Disoriented  to person, place, time or situation.    Therapy Mode Minutes  Occupational Therapy: Branch  Physical Therapy: Branch  Speech Language Pathology:  [facility]    Signed by: Rogers Erickson RN

## 2019-08-07 NOTE — PLAN OF CARE
Problem: Patient Care Overview  Goal: Plan of Care Review  Outcome: Ongoing (interventions implemented as appropriate)   08/07/19 1219   Patient Care Overview   IRF Plan of Care Review progress ongoing, continue   Progress, Functional Goals demonstrating adequate progress   Coping/Psychosocial   Plan of Care Reviewed With patient   OTHER   Outcome Summary At this time, patient is appropriate to advance to fork-mashed diet and nectar-thick liquids. Meds can be taken whole in puree or with NTL as tolerated. She can have ice chips or water between meals and after oral care. Patient must have supervision with all PO, be seated upright, cues may be needed for slowed rate of feeding, check for pocketing of food on the right side. ST to continue to monitor diet tolerance.

## 2019-08-08 LAB
GLUCOSE BLDC GLUCOMTR-MCNC: 104 MG/DL (ref 70–130)
GLUCOSE BLDC GLUCOMTR-MCNC: 151 MG/DL (ref 70–130)
GLUCOSE BLDC GLUCOMTR-MCNC: 162 MG/DL (ref 70–130)
GLUCOSE BLDC GLUCOMTR-MCNC: 256 MG/DL (ref 70–130)

## 2019-08-08 PROCEDURE — 92507 TX SP LANG VOICE COMM INDIV: CPT

## 2019-08-08 PROCEDURE — 63710000001 INSULIN REGULAR HUMAN PER 5 UNITS: Performed by: PHYSICAL MEDICINE & REHABILITATION

## 2019-08-08 PROCEDURE — 97535 SELF CARE MNGMENT TRAINING: CPT

## 2019-08-08 PROCEDURE — 97530 THERAPEUTIC ACTIVITIES: CPT

## 2019-08-08 PROCEDURE — 82962 GLUCOSE BLOOD TEST: CPT

## 2019-08-08 PROCEDURE — 97110 THERAPEUTIC EXERCISES: CPT

## 2019-08-08 RX ORDER — HYDRALAZINE HYDROCHLORIDE 10 MG/1
10 TABLET, FILM COATED ORAL EVERY 8 HOURS SCHEDULED
Status: DISCONTINUED | OUTPATIENT
Start: 2019-08-08 | End: 2019-08-10

## 2019-08-08 RX ADMIN — APIXABAN 5 MG: 5 TABLET, FILM COATED ORAL at 09:17

## 2019-08-08 RX ADMIN — BRIMONIDINE TARTRATE 1 DROP: 2 SOLUTION OPHTHALMIC at 09:16

## 2019-08-08 RX ADMIN — APIXABAN 5 MG: 5 TABLET, FILM COATED ORAL at 20:26

## 2019-08-08 RX ADMIN — METFORMIN HYDROCHLORIDE 850 MG: 850 TABLET ORAL at 17:20

## 2019-08-08 RX ADMIN — NEBIVOLOL HYDROCHLORIDE 20 MG: 10 TABLET ORAL at 09:17

## 2019-08-08 RX ADMIN — INSULIN HUMAN 3 UNITS: 100 INJECTION, SOLUTION PARENTERAL at 07:48

## 2019-08-08 RX ADMIN — ASPIRIN 325 MG: 325 TABLET ORAL at 09:16

## 2019-08-08 RX ADMIN — DORZOLAMIDE HYDROCHLORIDE 1 DROP: 20 SOLUTION/ DROPS OPHTHALMIC at 20:24

## 2019-08-08 RX ADMIN — METFORMIN HYDROCHLORIDE 850 MG: 850 TABLET ORAL at 07:49

## 2019-08-08 RX ADMIN — GLIPIZIDE 5 MG: 5 TABLET ORAL at 17:20

## 2019-08-08 RX ADMIN — ERGOCALCIFEROL 50000 UNITS: 1.25 CAPSULE ORAL at 15:53

## 2019-08-08 RX ADMIN — HYDRALAZINE HYDROCHLORIDE 10 MG: 10 TABLET, FILM COATED ORAL at 20:26

## 2019-08-08 RX ADMIN — DORZOLAMIDE HYDROCHLORIDE 1 DROP: 20 SOLUTION/ DROPS OPHTHALMIC at 09:16

## 2019-08-08 RX ADMIN — NYSTATIN 500000 UNITS: 100000 SUSPENSION ORAL at 17:21

## 2019-08-08 RX ADMIN — LOSARTAN POTASSIUM: 50 TABLET, FILM COATED ORAL at 09:16

## 2019-08-08 RX ADMIN — NEBIVOLOL HYDROCHLORIDE 20 MG: 10 TABLET ORAL at 20:24

## 2019-08-08 RX ADMIN — BRIMONIDINE TARTRATE 1 DROP: 2 SOLUTION OPHTHALMIC at 20:23

## 2019-08-08 RX ADMIN — ATORVASTATIN CALCIUM 80 MG: 80 TABLET, FILM COATED ORAL at 20:26

## 2019-08-08 RX ADMIN — GLIPIZIDE 5 MG: 5 TABLET ORAL at 06:33

## 2019-08-08 RX ADMIN — NYSTATIN 500000 UNITS: 100000 SUSPENSION ORAL at 12:04

## 2019-08-08 RX ADMIN — NYSTATIN 500000 UNITS: 100000 SUSPENSION ORAL at 07:48

## 2019-08-08 RX ADMIN — LANSOPRAZOLE 30 MG: KIT at 17:20

## 2019-08-08 RX ADMIN — AMANTADINE HYDROCHLORIDE 100 MG: 100 CAPSULE ORAL at 06:33

## 2019-08-08 RX ADMIN — LANSOPRAZOLE 30 MG: KIT at 06:36

## 2019-08-08 RX ADMIN — INSULIN HUMAN 8 UNITS: 100 INJECTION, SOLUTION PARENTERAL at 12:03

## 2019-08-08 NOTE — PLAN OF CARE
Problem: Skin Injury Risk (Adult)  Goal: Skin Health and Integrity  Outcome: Ongoing (interventions implemented as appropriate)      Problem: Fall Risk (Adult)  Goal: Absence of Fall  Outcome: Ongoing (interventions implemented as appropriate)      Problem: Patient Care Overview  Goal: Plan of Care Review  Outcome: Ongoing (interventions implemented as appropriate)   08/07/19 1435 08/07/19 2227 08/08/19 3439   Patient Care Overview   IRF Plan of Care Review progress ongoing, continue --  --    Progress, Functional Goals demonstrating adequate progress --  --    Coping/Psychosocial   Plan of Care Reviewed With --  patient --    OTHER   Outcome Summary --  --  Forgetful/impulsive, verbal cues for safety. Ambulates CGA 1. Continent bladder, incont. bm. Meds crushed with applesauce. No c/o pain. Answers yes/no understandable. Rambles with garbled speech at times.     Goal: Individualization and Mutuality  Outcome: Ongoing (interventions implemented as appropriate)    Goal: Coping Plan  Outcome: Ongoing (interventions implemented as appropriate)      Problem: Stroke (IRF) (Adult)  Goal: Promote Optimal Functional [place]  Outcome: Ongoing (interventions implemented as appropriate)

## 2019-08-08 NOTE — PROGRESS NOTES
Inpatient Rehabilitation Plan of Care Note    Plan of Care  Care Plan Reviewed - Updates as Follows    Sphincter Control    [RN] Bladder Management(Active)  Current Status(08/07/2019): incontinent bladder 50%  Weekly Goal(08/14/2019): continent 50%  Discharge Goal: continent 100%    Performed Intervention(s)  Monitor intake and output  Encourage fluid intake  Elimination schedule      Psychosocial    Performed Intervention(s)  Assist patient to express needs and concerns      Safety    Performed Intervention(s)  Bed alarm, wc alarm  Safety rounds  Items within reach      Body Systems    Performed Intervention(s)  Daily skin inspection    Signed by: Di Saldaña RN

## 2019-08-08 NOTE — PROGRESS NOTES
Inpatient Rehabilitation Functional Measures Assessment    Functional Measures  KATI Eating:  [facility] Grooming: [facility] Bathing:  [facility] Upper Body Dressing:  [facility] Lower Body Dressing:  [facility] Toileting:  [facility] Bladder Management  Level of Assistance:  [place]  Frequency/Number of Accidents this Shift:  [facility] Bowel Management  Level of Assistance: [place]  Frequency/Number of Accidents this Shift: [facility] Bed/Chair/Wheelchair Transfer:  Activity was not observed.  KATI Toilet Transfer:  [facility] Tub/Shower Transfer:  [place]    Previously Documented Mode of Locomotion at Discharge: Field  KATI Expected Mode of Locomotion at Discharge: [facility] Walk/Wheelchair:  WHEELCHAIR OBSERVATION   Activity was not observed.    WALK OBSERVATION   Walk Distance Scale = 3.  Distance walked is greater than 150 feet. Walk Score  = 4.  Patient performs 75% or more of effort and requires minimal assistance.  Incidental help/contact guard/steadying was provided. Patient walked a distance  of  200 feet. No assistive devices were required.  KATI Stairs:  Stairs Score = 2.  Incidental assistance with lifting or lowering,  contact guard or steadying was provided. Patient performs 75% or more of effort  and requires minimal contact assistance. Patient negotiated  4 stairs. Patient  requires the following assistive device(s): Handrail(s).    KATI Comprehension:  [place]  KATI Expression:  [facility] Social Interaction:  [facility] Problem Solving:  [facility] Memory:  [place]    Therapy Mode Minutes  Occupational Therapy: [place]  Physical Therapy: Individual: 60 minutes.  Speech Language Pathology:  [place]    Signed by: Mira Chadwick, PT

## 2019-08-08 NOTE — THERAPY TREATMENT NOTE
Inpatient Rehabilitation - Physical Therapy Treatment Note  [facility]     Patient Name: Darby Workman  : 1945  MRN: [id number]    Today's Date: 2019                 Admit Date: 2019      Visit Dx:    No diagnosis found.    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)   • Right-sided headache   • Acute ischemic stroke (CMS/HCC)   • Embolic stroke (CMS/HCC)   • Cerebral infarction due to stenosis of left carotid artery (CMS/HCC)   • Anticoagulated by anticoagulation treatment   • Stroke (cerebrum) (CMS/HCC)       Therapy Treatment    IRF Treatment Summary     Row Name 19 0930 19 0850          Evaluation/Treatment Time and Intent    Subjective Information  no complaints  -ML  no complaints  -MD     Existing Precautions/Restrictions  --  fall  -MD     Document Type  therapy note (daily note)  -ML  therapy note (daily note)  -MD     Mode of Treatment  speech-language pathology  -ML  physical therapy  -MD     Patient/Family Observations  Pt alert, pleasant, and cooperative with therapy. Pt seen in SLP office sitting in wheelchair.   -ML  Pt sitting in WC showing no signs of acute distress.  -MD     Start Time (Evaluation/Treatment)  0930  -ML  --     Stop Time (Evaluation/Treatment)  1000  -ML  --     Recorded by [ML] Patsy Gregg MS CCC-SLP [MD] Mira Chadwick PT     Row Name 19 0850             Cognition/Psychosocial- PT/OT    Orientation Status (Cognition)  unable/difficult to assess  -MD      Follows Commands (Cognition)  follows one step commands;25-49% accuracy;0-24% accuracy;delayed response/completion;increased processing time needed;initiation impaired;repetition of directions required;verbal cues/prompting required  -MD      Personal Safety Interventions  fall prevention program maintained;gait  belt  -MD      Recorded by [MD] Mira Chadwick, PT      Row Name 08/08/19 0850             Sit-Stand Transfer    Sit-Stand [place] (Transfers)  contact guard  -MD      Recorded by [MD] Mira Chadwick, PT      Row Name 08/08/19 0850             Stand-Sit Transfer    Stand-Sit [place] (Transfers)  contact guard  -MD      Recorded by [MD] Mira Chadwick, PT      Row Name 08/08/19 0850             Gait/Stairs Assessment/Training    [place] Level (Gait)  contact guard  -MD      Distance in Feet (Gait)  200x2 and 160` x1  -MD      Pattern (Gait)  step-through  -MD      Deviations/Abnormal Patterns (Gait)  festinating/shuffling;eliza decreased  -MD      Bilateral Gait Deviations  forward flexed posture;heel strike decreased  -MD      Recorded by [MD] Mira Chadwick, PT      Row Name 08/08/19 0850             Pain Scale: Numbers Pre/Post-Treatment    Pain Scale: Numbers, Pretreatment  0/10 - no pain  -MD      Recorded by [MD] Mira Chadwick, PT      Row Name 08/08/19 0850             Standing Balance Activity    Activities Performed (Standing, Balance Training)  standing ball toss tossing bean bags at clown   -MD      Support Needed for Balance (Standing, Balance Training)  CGA;minimal external support for balance, 75% patient effort  -MD      Recorded by [MD] Mira Chadwick, PT      Row Name 08/08/19 0850             Dynamic Balance Activity    Therapeutic Training Performed (Dynamic Balance)  backward walking  -MD      Support Needed for Balance (Dynamic Balance Training)  minimal external support for balance, 75% patient effort  -MD      Comment (Dynamic Balance Training)  stepping over hurddles in // bars w CGA.  Side stepping over hurddles in // bars w Min A.  -MD      Recorded by [MD] Mira Chadwick, PT      Row Name 08/08/19 0850             Lower Extremity Standing Therapeutic Exercise    Performed, Standing Lower Extremity (Therapeutic Exercise)  heel raises  -MD      Device, Standing Lower Extremity (Therapeutic Exercise)   neftali bars  -MD      Exercise Type, Standing Lower Extremity (Therapeutic Exercise)  AROM (active range of motion)  -MD      Sets/Reps Detail, Standing Lower Extremity (Therapeutic Exercise)  1/10  -MD      Recorded by [MD] Mira Chadwick PT      Row Name 08/08/19 0850             Positioning and Restraints    Pre-Treatment Position  sitting in chair/recliner  -MD      Post Treatment Position  wheelchair  -MD      In Wheelchair  with OT  -MD      Recorded by [MD] Mira Chadwick PT        User Key  (r) = Recorded By, (t) = Taken By, (c) = Cosigned By    Initials Name Effective Dates    Mira Vaca PT 04/03/18 -     ML Patsy Gregg, MS CCC-SLP 10/04/18 -         Wound 07/17/19 1015 Left neck incision (Active)   Dressing Appearance open to air 8/8/2019  7:21 AM   Closure Liquid skin adhesive 8/8/2019  7:21 AM   Base dry;clean 8/8/2019  7:21 AM   Drainage Amount none 8/7/2019 10:27 PM     Physical Therapy Education     Title: PT OT SLP Therapies (Not Started)     Topic: Physical Therapy (In Progress)     Point: Mobility training (In Progress)     Learning Progress Summary           Patient Nonacceptance, E,TB,D, NR by ENID at 8/5/2019 12:00 PM    Acceptance, E,D, NR by NESTOR at 8/3/2019  1:35 PM    Acceptance, D, DU,NR by NESTOR at 8/3/2019  8:56 AM                   Point: Home exercise program (In Progress)     Learning Progress Summary           Patient Nonacceptance, E,TB,D, NR by ENID at 8/5/2019 12:00 PM                   Point: Precautions (In Progress)     Learning Progress Summary           Patient Acceptance, E, NR by MD at 8/8/2019 11:48 AM    Acceptance, E, NR by MD at 8/6/2019 10:41 AM    Acceptance, E, NR by MD at 8/2/2019 10:44 AM    Acceptance, E,D, NR by MD at 8/1/2019 12:16 PM                               User Key     Initials Effective Dates Name Provider Type Discipline    REAL 04/03/18 -  Nicole Austin, PT Physical Therapist PT    MD 04/03/18 -  Mira Chadwick PT Physical Therapist PT    ENID 04/03/18 -   Marycruz Schmitt, PT Physical Therapist PT                  PT Recommendation and Plan  Anticipated Equipment Needs at Discharge (PT Eval): front wheeled walker  Frequency of Treatment (PT Eval): 5 times per week, 60 minutes per session            PT IRF GOALS     Row Name 08/08/19 1100             Bed Mobility Goal 1 (PT-IRF)    Progress/Outcomes (Bed Mobility Goal 1, PT-IRF)  goal ongoing  -MD         Transfer Goal 1 (PT-IRF)    Activity/Assistive Device (Transfer Goal 1, PT-IRF)  sit-to-stand/stand-to-sit  -MD      [place] Level (Transfer Goal 1, PT-IRF)  standby assist  -MD      Time Frame (Transfer Goal 1, PT-IRF)  2 weeks  -MD      Progress/Outcomes (Transfer Goal 1, PT-IRF)  goal revised this date  -MD         Transfer Goal 2 (PT-IRF)    Activity/Assistive Device (Transfer Goal 2, PT-IRF)  car transfer  -MD      [place] Level (Transfer Goal 2, PT-IRF)  contact guard assist  -MD      Time Frame (Transfer Goal 2, PT-IRF)  2 weeks  -MD      Progress/Outcomes (Transfer Goal 2, PT-IRF)  goal revised this date  -MD         Gait/Walking Locomotion Goal 1 (PT-IRF)    Activity/Assistive Device (Gait/Walking Locomotion Goal 1, PT-IRF)  gait (walking locomotion)  -MD      Gait/Walking Locomotion Distance Goal 1 (PT-IRF)  200  -MD      [place] Level (Gait/Walking Locomotion Goal 1, PT-IRF)  standby assist;contact guard assist  -MD      Time Frame (Gait/Walking Locomotion Goal 1, PT-IRF)  2 weeks  -MD      Progress/Outcomes (Gait/Walking Locomotion Goal 1, PT-IRF)  goal revised this date  -MD        User Key  (r) = Recorded By, (t) = Taken By, (c) = Cosigned By    Initials Name Provider Type    Mira Vaca, PT Physical Therapist               Time Calculation:     PT Charges     Row Name 08/08/19 1430 08/08/19 0810          Time Calculation    Start Time  1400  -MD  0800  -MD     Stop Time  1430  -MD  0830  -MD     Time Calculation (min)  30 min  -MD  30 min  -MD     PT Received On  --  08/08/19  -MD      PT - Next Appointment  --  08/09/19  -MD     PT Goal Re-Cert Due Date  --  08/15/19  -MD       User Key  (r) = Recorded By, (t) = Taken By, (c) = Cosigned By    Initials Name Provider Type    Mira Vaca, PT Physical Therapist          Therapy Charges for Today     Code Description Service Date Service Provider Modifiers Qty    20437453708 HC PT THER PROC EA 15 MIN 8/7/2019 Mira Chadwick, PT GP 2    33860827749 HC PT THER PROC EA 15 MIN 8/8/2019 Mira Chadwick PT GP 4                   Mira Chadwick, PT  8/8/2019

## 2019-08-08 NOTE — PROGRESS NOTES
Inpatient Rehabilitation Functional Measures Assessment    Functional Measures  KATI Eating:  [facility] Grooming: [facility] Bathing:  [facility] Upper Body Dressing:  [facility] Lower Body Dressing:  [facility] Toileting:  [facility] Bladder Management  Level of Assistance:  [place]  Frequency/Number of Accidents this Shift:  [facility] Bowel Management  Level of Assistance: [place]  Frequency/Number of Accidents this Shift: [facility] Bed/Chair/Wheelchair Transfer:  [facility] Toilet Transfer:  [facility] Tub/Shower Transfer:  [place]    Previously Documented Mode of Locomotion at Discharge: Field  KATI Expected Mode of Locomotion at Discharge: [facility] Walk/Wheelchair:  [facility] Stairs:  [facility] Comprehension:  Auditory comprehension is the usual mode. Patient does not  comprehend complex/abstract information in their primary language without  assistance from a helper. Comprehension Score = 2, Maximal Prompting. Patient  comprehends basic daily needs 25-49% of the time. Patient understands simple  information via single words or gestures. Requires maximal/a lot of prompting  (most of the time). No assistive devices were required.  KATI Expression:  Vocal expression is the usual mode. Patient does not express  complex/abstract information in their primary language without a helper.  Expression Score = 2, Maximal Prompting. Patient expresses basic daily needs  25-49% of the time. Patient uses only single words or gestures and requires  maximal/a lot of prompting (most of the time). No assistive devices were  required.  KATI Social Interaction:  Social Interaction Score = 4, Minimal Direction.  Patient interacts appropriately 75-90% of the time. Patient requires  minimal/occasional direction for the following behavior(s): irritable/  frustrated at times .  KATI Problem Solving:  Patient does not make appropriate decisions in order to  solve complex problems without assistance from a helper.  Problem Solving Score =  1, Total Assistance. Patient makes appropriate decisions in order to solve  routine problems less than 25% of the time. Patient requires total direction for  the following behavior(s): Decreased awareness of performance. Difficulty  completing tasks. Difficulty with self-correction. Difficulty with  self-monitoring. Impulsivity. Poor judgment. Inability to follow simple  commands.  KATI Memory:  Activity was not observed.    Therapy Mode Minutes  Occupational Therapy: [place]  Physical Therapy: [place]  Speech Language Pathology:  [place]    Signed by: Di Saldaña RN

## 2019-08-08 NOTE — PROGRESS NOTES
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  Does not indicate any complaint of headache.  No acute changes noted in her strength on the right side.          History taken from: patient chart RN    Objective     Vital Signs  Temp:  [97.9 °F (36.6 °C)-98.6 °F (37 °C)] 98.1 °F (36.7 °C)  Heart Rate:  [63-66] 66  Resp:  [18] 18  BP: (135-178)/(70-89) 135/89    Objective  Physical Exam  MENTAL STATUS -  AWAKE / ALERT  HEENT- NCAT,   SCLERA NON-ICTERIC, CONJUNCTIVA PINK, OP MOIST, NO JVD, EARS UNREMARKABLE EXTERNALLY  LUNGS - CTA, NO WHEEZES, RALES OR RHONCHI  HEART- RRR, NO RUB, MURMUR, OR GALLOP  ABD - NORMOACTIVE BOWEL SOUNDS, SOFT, NT.    EXT - NO EDEMA OR CYANOSIS  NEURO -alert.  Aphasia.  She will get occasional single word    Does not follow commands.        MOTOR EXAM -patient moves the left side more than the right but takes resistance bilaterally         Results Review:     I reviewed the patient's new clinical results.  Glucose   Date/Time Value Ref Range Status   08/08/2019 1625 151 (H) 70 - 130 mg/dL Final   08/08/2019 1105 256 (H) 70 - 130 mg/dL Final   08/08/2019 0720 162 (H) 70 - 130 mg/dL Final   08/07/2019 2104 193 (H) 70 - 130 mg/dL Final   08/07/2019 1621 101 70 - 130 mg/dL Final   08/07/2019 1106 242 (H) 70 - 130 mg/dL Final   08/07/2019 0721 185 (H) 70 -  130 mg/dL Final   08/06/2019 2036 238 (H) 70 - 130 mg/dL Final     Results from last 7 days   Lab Units 08/07/19  0801 08/05/19  0726 08/03/19  0746   WBC 10*3/mm3 7.17 5.40 8.60   HEMOGLOBIN g/dL 9.6* 9.8* 9.7*   HEMATOCRIT % 31.3* 30.8* 31.1*   PLATELETS 10*3/mm3 374 419 448       Ref. Range 5/22/2019 05:44 5/22/2019 11:34 7/9/2019 08:25 7/18/2019 04:49 7/19/2019 05:05 7/23/2019 05:56 8/1/2019 06:48   Hemoglobin Latest Ref Range: 12.0 - 15.9 g/dL 10.8 (L)  10.6 (L) 8.5 (L) 9.7 (L) 9.3 (L) 7.4 (L)   Hematocrit Latest Ref Range: 34.0 - 46.6 % 35.1  33.4 (L) 26.2 (L) 29.9 (L) 28.6 (L) 23.6 (L)     Results from last 7 days   Lab Units 08/07/19  0801 08/05/19  0726 08/02/19  0720   SODIUM mmol/L 139 136 146*   POTASSIUM mmol/L 3.6 3.4* 3.7   CHLORIDE mmol/L 99 99 105   CO2 mmol/L 27.5 27.3 29.3*   BUN mg/dL 14 18 30*   CREATININE mg/dL 0.69 0.87 0.92   CALCIUM mg/dL 8.9 9.1 8.8   GLUCOSE mg/dL 201* 276* 193*         Ref. Range 8/1/2019 06:47   25 Hydroxy, Vitamin D Latest Ref Range: 30.0 - 100.0 ng/ml 21.8 (L)       Ref. Range 8/1/2019 06:47   Total Cholesterol Latest Ref Range: 0 - 200 mg/dL 105   HDL Cholesterol Latest Ref Range: 40 - 60 mg/dL 40   LDL Cholesterol  Latest Ref Range: 0 - 100 mg/dL 57   VLDL Cholesterol Latest Ref Range: 5 - 40 mg/dL 8   Triglycerides Latest Ref Range: 0 - 150 mg/dL 40   Medication Review: done  Scheduled Meds:    amantadine 100 mg Oral QAM   apixaban 5 mg Oral Q12H   aspirin 325 mg Oral Daily   atorvastatin 80 mg Oral Nightly   brimonidine 1 drop Both Eyes BID   dorzolamide 1 drop Both Eyes BID   glipiZIDE 5 mg Oral BID AC   insulin regular 0-14 Units Subcutaneous TID AC   lansoprazole 30 mg Oral BID AC   losartan-HCTZ (HYZAAR) 100-12.5 combo dose  Oral Daily   metFORMIN 850 mg Oral BID With Meals   nebivolol 20 mg Oral BID   nystatin 5 mL Swish & Spit 4x Daily   vitamin D 50,000 Units Oral Q7 Days     Continuous Infusions:   PRN Meds:.•  aluminum-magnesium hydroxide-simethicone  •   dextrose  •  dextrose  •  glucagon (human recombinant)  •  nitroglycerin      Assessment/Plan       Stroke (cerebrum) (CMS/HCC)      Assessment & Plan  Status post left CVA with aphasia and spastic right hemiparesis    Neuro stimulation-amantadine added July 27    Dysphagia-Cortrak feeding tube discontinued on July 31 and started on puréed/honey thick liquid diet with strategies  August 7-advance to fork mash nectar thick liquid diet, consistent carbohydrate.    History of multiple bilateral strokes and left central retinal artery occlusion    History of left greater than right internal carotid artery stenosis-status post left internal carotid artery stent July 17, 2019    History of hypertension -  Hyzaar 100-25. August 4 - Bystolic increased to 20 mg daily to 20 mg bid.  August 8 - BP still runs high. On discharge in May 2019 was on Hyzaar 100-25 mg daily, Bystolic 20 mg daily, amlodipine 10 mg daily, Hydralazine 50 mg q 8 hours.  Will add Hydralazine initially 10 mg po q 8 hours and titrate up as needed.     History of diabetes mellitus -Lantus/glipizide (home medication metformin 1000 mg twice daily and glipizide 10 mg twice daily)  August 1-blood sugars were low yesterday afternoon after tube feeds discontinued.  Held glipizide last night and this morning and Lantus last night.  Blood sugar elevated at noontime today.  Will receive resume glipizide at a lower dose of 5 mg twice a day with meals.  Continue sliding scale insulin coverage.  She also is on metformin at home.  August 5-add back metformin initially at 500 mg twice a day and continue glipizide 5 mg twice a day, both one half of previous home dose, titrate up as needed.  August 7-titrate up metformin to 850 mg twice a day.  Add consistent carbohydrate restriction to diet.    Stroke prophylaxis-aspirin/Eliquis/atorvastatin    Anemia-August 1-hemoglobin 7.4.  Most recent check on July 23 HGB 9.3.  Will recheck hemoglobin this afternoon.  Hemoccult stool.   Iron studies.  Reticulocyte count.  Recheck CBC in the a.m.  Added Protonix.  Patient is on aspirin and Eliquis.  With recent stroke  will look to transfuse packed red blood cell.  August 2-hemoglobin improved 9.0-1 unit packed red blood cells.  Elevated reticulocyte count in response to anemia.  Nursing describes dark stool.  Patient discussed with gastroenterology.  At this point unable to do endoscopy as on Eliquis and aspirin.  Will treat symptomatically for now with increasing proton pump inhibitor and transfusing as needed.  August 5-anemia improved-hemoglobin 9.8    DVT prophylaxis-SCDs/anticoagulation    Impulsivity-   Nursing to do re-orientation with the patient.  Room close to the nursing station.    Endocrine-vitamin D deficiency-ergocalciferol 50,000 units weekly x8 weeks added. Vitamin B12 level and TSH checked and late May unremarkable     Impaired cognition/impaired language, impaired swallow, impaired activity daily living, impaired mobility     Now admit for comprehensive acute inpatient rehabilitation .  This would be an interdisciplinary program with physical therapy 1 hour,  occupational therapy 1 hour, and speech therapy 1 hour, 5 days a week.  Rehabilitation nursing for carryover, monitoring of cardiopulmonary and neurologic   status, bowel and bladder, and skin  Ongoing physician follow-up.  Weekly team conferences.  Goals are indeterminant.   Rehabilitation prognosis determined.  Medical prognosis determined.  Estimated length of stay is indeterminate.    TEAM CONF - AUGUST 6- TRANFERS CTG. GAIT UP  FEET CTG-MIN ASSIST. UBD MIN. LBD MOD. BATH MOD. SEVERE APHASIA. INCREASED RESISTANCE TO PARTICIPATE AT TIMES.   PUREE/HTL.  GOOD PO INTAKE. ADJUSTING MEDS FOR DIABETES MELLITUS.  BLADDER CONTINENT/INCONTIENT.   ELOS- 2 WEEKS.       Ajit Howard MD  08/08/19  5:31 PM    Time:

## 2019-08-08 NOTE — THERAPY TREATMENT NOTE
Inpatient Rehabilitation - Speech Language Pathology Treatment Note    [facility]    Patient Name: Darby Workman  : 1945  MRN: [id number]  Today's Date: 2019      Admit Date: 2019  Visit Dx:    No diagnosis found.    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)   • Right-sided headache   • Acute ischemic stroke (CMS/HCC)   • Embolic stroke (CMS/HCC)   • Cerebral infarction due to stenosis of left carotid artery (CMS/HCC)   • Anticoagulated by anticoagulation treatment   • Stroke (cerebrum) (CMS/HCC)     Therapy Treatment  Evaluation/Coping  Evaluation/Treatment Time and Intent  Subjective Information: no complaints (19 1330 : Bruton, Katherine L)  Existing Precautions/Restrictions: fall(swallow, communication) (19 1330 : Bruton, Katherine L)  Document Type: therapy note (daily note) (19 1330 : Bruton, Katherine L)  Mode of Treatment: speech-language pathology (19 1330 : Bruton, Katherine L)  Patient/Family Observations: seated in wc at nurses station; agreeable to therapy (19 1330 : Bruton, Katherine L)  Start Time (Evaluation/Treatment): 1330 (19 1330 : Bruton, Katherine L)  Stop Time (Evaluation/Treatment): 1400 (19 1330 : Bruton, Katherine L)    Vitals/Pain/Safety  Pain Scale: Numbers Pre/Post-Treatment  Pain Scale: Numbers, Pretreatment: 0/10 - no pain (19 1330 : Bruton, Katherine L)  Pain Scale: Numbers, Post-Treatment: 0/10 - no pain (19 1330 : Bruton, Katherine L)    EDUCATION  The patient has been educated in the following areas:   Communication Impairment.    SLP Recommendation and Plan  SLP Diagnosis: severe global aphasia, cognitive impairment  SLP Diagnosis: severe global aphasia, cognitive impairment  Rehab Potential/Prognosis: good  Anticipated Dischage  Disposition: home with assist, anticipate therapy at next level of care  Predicted Duration Therapy Intervention (Days): until discharge  Plan of Care Reviewed With: patient     SLP GOALS     Row Name 08/08/19 1330 08/08/19 0930 08/07/19 1130       Oral Nutrition/Hydration Goal 2 (SLP)    Oral Nutrition/Hydration Goal 2, SLP  --  --  Pt will tolerate fork-mashed, NTL without s/s pen/asp.   -KB    Time Frame (Oral Nutrition/Hydration Goal 2, SLP)  --  --  by discharge  -KB    Barriers (Oral Nutrition/Hydration Goal 2, SLP)  --  --  Patient is consistently tolerating puree/HTL for meals at this time. Trials of NTL, TL, puree, mech soft, mixed consistency, and regular solids were tested this date. Right facial weakness persists, which resulted in mild oal residue in the right buccal cavity, which patient was able to clear with cues lingual sweep and liquid wash. Slow but adequate mastication observed with regular solid, mild lingual residue cleared with liquid wash. No overt s/s pen/asp with any consistencies tested, including mixed consistency, NTL via cup/straw, and TL via cup/straw. Plan to observe patient with test tray of mech soft, TL next date.   -KB    Progress/Outcomes (Oral Nutrition/Hydration Goal 2, SLP)  --  --  goal met goal modified  -KB       Words/Phrases/Sentences Goal 1 (SLP)    Improve Ability to Comprehend Words/Phrases/Sentences Through: Goal 1 (SLP)  --  identify body part;independently (over 90% accuracy)  -ML  --    Time Frame (Identify Objects and Pictures Goal 1, SLP)  --  by discharge  -ML  --    Progress (Ability to Contruct Words/Phrases/Sentences Goal 1, SLP)  with 1:1 supervision/constant cues  -KB  80%  -ML  --    Progress/Outcomes (Identify Objects and Pictures Goal 1, SLP)  goal ongoing  -KB  good progress toward goal;goal ongoing  -ML  --    Comment (Words/Phrases/Sentences Goal 1, SLP)  20% identifying named body parts, 90% with direct model  -KB  --  --       Word Retrieval Skills  Goal 1 (SLP)    Improve Word Retrieval Skills By Goal 1 (SLP)  --  completing automatic speech task, counting;completing automatic speech task, alphabet;confrontational naming task;simple;repeating sounds;repeating words Row Row Row Your Boat  -ML  --    Time Frame (Word Retrieval Goal 1, SLP)  --  by discharge  -ML  --    Progress (Word Retrieval Skills Goal 1, SLP)  with 1:1 supervision/constant cues  -KB  with maximum cues (25-49%)  -ML  --    Progress/Outcomes (Word Retrieval Goal 1, SLP)  goal ongoing  -KB  goal ongoing  -ML  --    Comment (Word Retrieval Goal 1, SLP)  10%, 0%, 20% counting 1-10 with visual support and verbal model across 3 trials respectively  -KB  30% of counting 1-10 with max cues, 10-20% of ABC's and Row Row Row Your Boat with max cues ( pt [id number]% of svetlana), 3/5 repeating CV combinations with max cues, 0/3 repeating VC combinations with max cues, 5/5 vowels with max cues, 2/5 confrontational naming simple objects with max cues and repetition  -ML  --       Word Retrieval Skills Goal 2 (SLP)    Improve Word Retrieval Skills By Goal 2 (SLP)  --  -- using communication board  -ML  --    Time Frame (Word Retrieval Goal 2, SLP)  --  by discharge  -ML  --    Progress (Word Retrieval Skills Goal 2, SLP)  --  independently (over 90% accuracy)  -ML  --    Progress/Outcomes (Word Retrieval Goal 2, SLP)  --  goal ongoing  -ML  --    Comment (Word Retrieval Goal 2, SLP)  --  3/6 accurate  -ML  --       Graphic Expression of Shapes, Letters, Numbers Goal 1 (SLP)    Improve Graphic Expression of Shapes, Letters, and Numbers Goal 1 (SLP)  --  copy shapes, numbers, and letters;independently (over 90% accuracy)  -ML  --    Time Frame (Graphic Expression of Shapes, Letters, and Numbers Goal 1, SLP)  --  by discharge  -ML  --    Progress (Graphic Expression of Shapes, Letters, and Numbers Goal 1, SLP)  --  with maximum cues (25-49%) 1/3  -ML  --    Progress/Outcomes (Graphic Expression of Shapes,  Letters, and Numbers Goal 1, SLP)  --  goal ongoing  -ML  --       Graphic Expression of Words Goal 1 (SLP)    Graphic Expression of Single Words Goal 1 (SLP)  --  copy word  -ML  --    Time Frame (Graphic Expression of Single Words Goal 1, SLP)  --  by discharge  -ML  --    Progress (Graphic Expression of Single Words Goal 1, SLP)  --  with 1:1 supervision/constant cues  -ML  --    Progress/Outcomes (Graphic Expression of Single Words Goal 1, SLP)  --  goal ongoing  -ML  --    Comment (Graphic Expression of Single Words Goal 1, SLP)  --  0/3, pt wrote initial letter of 2/3 but then non-intelligible  -ML  --       Planning and Execution of Connected Speech Goal 1 (SLP)    Progress (Planning and Execution of Connected Speech Goal 1, SLP)  with maximum cues (25-49%)  -KB  --  --    Progress/Outcomes (Planning and Execution of Connected Speech Goal 1, SLP)  goal ongoing  -KB  --  --    Comment (Planning and Execution of Connected Speech Goal 1, SLP)  30% imitating functional CV words, 70% with repeated model and visual cues  -KB  --  --       Augmentative/Alternative Communication Objectives Goal 1 (SLP    Comment (Augmentative/Alternative Communication Goal 1, SLP)  Basic picture communicaiton board left in patient's room per RN request yesterday. Educated RN both yesterday and today regarding lack of patient's ability to use this funcitonally. Demonstrated how picture communicaiton board can be used to pair with verbal language and possibly increase patient's comprehension.   -KB  --  --       Additional Goal 1 (SLP)    Barriers (Additional Goal 1, SLP)  87% matching picture to picture in fo3  -KB  --  --    Progress/Outcomes (Additional Goal 1, SLP)  good progress toward goal;goal ongoing  -KB  --  --    Row Name 08/07/19 0930 08/06/19 1230 08/06/19 0930       Oral Nutrition/Hydration Goal 2 (SLP)    Oral Nutrition/Hydration Goal 2, SLP  --  Pt will tolerate honey and puree without overt s/s of aspiration,  pocketing, or anterior loss across three meal observations.  -KB  --    Time Frame (Oral Nutrition/Hydration Goal 2, SLP)  --  by discharge  -KB  --    Barriers (Oral Nutrition/Hydration Goal 2, SLP)  --  Patient is consistently tolerating puree/HTL for meals at this time. Trials of NTL, TL, puree, mech soft, mixed consistency, and regular solids were tested this date. Right facial weakness persists, which resulted in mild oal residue in the right buccal cavity, which patient was able to clear with cues lingual sweep and liquid wash. Slow but adequate mastication observed with regular solid, mild lingual residue cleared with liquid wash. No overt s/s pen/asp with any consistencies tested, including mixed consistency, NTL via cup/straw, and TL via cup/straw. Plan to observe patient with test tray of mech soft, TL next date.   -KB  --    Progress/Outcomes (Oral Nutrition/Hydration Goal 2, SLP)  --  good progress toward goal;goal ongoing  -KB  --       Words/Phrases/Sentences Goal 1 (SLP)    Progress (Ability to Contruct Words/Phrases/Sentences Goal 1, SLP)  with moderate cues (50-74%)  -KB  --  with maximum cues (25-49%)  -KB    Progress/Outcomes (Identify Objects and Pictures Goal 1, SLP)  good progress toward goal;goal ongoing  -KB  --  goal ongoing  -KB    Comment (Words/Phrases/Sentences Goal 1, SLP)  60% identifying named body parts, 100% following direct model  -KB  --  30% identifying body parts by name, 100% with direct physical model  -KB       Reading Comprehension of Basic Signs and Letters Goal 1 (SLP)    Progress (Reading Comprehension of Basic Signs and Letters Goal 1, SLP)  with moderate cues (50-74%);with maximum cues (25-49%)  -KB  --  with maximum cues (25-49%)  -KB    Progress/Outcomes (Reading Comprehension of Basic Signs and Letters Goal 1, SLP)  goal ongoing  -KB  --  goal ongoing  -KB    Comment (Reading Comprehension of Basic Signs and Letters Goal 1, SLP)  53% sequencing letters A-M, 100% with  mod-max cues  -KB  --  30% identifying named letter in fo2  -KB       Word Retrieval Skills Goal 1 (SLP)    Progress (Word Retrieval Skills Goal 1, SLP)  --  --  with maximum cues (25-49%);with 1:1 supervision/constant cues  -KB    Progress/Outcomes (Word Retrieval Goal 1, SLP)  --  --  goal ongoing  -KB    Comment (Word Retrieval Goal 1, SLP)  --  --  20%, 30%, 20% counting 1-10 with verbal model, visual and tactile cues  -KB       Planning and Execution of Connected Speech Goal 1 (SLP)    Progress (Planning and Execution of Connected Speech Goal 1, SLP)  with moderate cues (50-74%);with maximum cues (25-49%)  -KB  --  with moderate cues (50-74%)  -KB    Progress/Outcomes (Planning and Execution of Connected Speech Goal 1, SLP)  good progress toward goal;goal ongoing  -KB  --  goal ongoing  -KB    Comment (Planning and Execution of Connected Speech Goal 1, SLP)  38% imitating functional CV syllable words, 63% with mod cues  -KB  --  50% imitating functional CV syllable words with mod cues  -KB       Augmentative/Alternative Communication Objectives Goal 1 (SLP    Progress (Augmentative/Alternative Communication Goal 1, SLP)  --  --  with 1:1 supervision/constant cues  -KB    Progress/Outcomes (Augmentative/Alternative Communication Goal 1, SLP)  --  --  goal ongoing  -KB    Comment (Augmentative/Alternative Communication Goal 1, SLP)  --  --  Provided [place] assist and verbal models to introduce 6 requests on basic communication picture board, minimal comprehension at this time  -KB       Additional Goal 1 (SLP)    Barriers (Additional Goal 1, SLP)  100% matching like numbers from one side of the page to the other after initial model for instruction  -KB  --  100% sequencing visual numbers 1-10  -KB    Progress/Outcomes (Additional Goal 1, SLP)  goal ongoing  -KB  --  goal ongoing  -KB      User Key  (r) = Recorded By, (t) = Taken By, (c) = Cosigned By    Initials Name Provider Type    KB Bruton, Katherine L Speech  and Language Pathologist    Patsy Barber, MS CCC-SLP Speech and Language Pathologist           SLP Outcome Measures (last 72 hours)      SLP Outcome Measures     Row Name 08/07/19 1200             SLP Outcome Measures    Outcome Measure Used?  Adult NOMS  -KB         Adult FCM Scores    FCM Chosen  Swallowing  -KB      Swallowing FCM Score  4  -KB        User Key  (r) = Recorded By, (t) = Taken By, (c) = Cosigned By    Initials Name Effective Dates    PIETER Bruton, Katherine L 03/07/18 -         Time Calculation:   Time Calculation- SLP     Row Name 08/08/19 1400 08/08/19 1018          Time Calculation- SLP    SLP Start Time  1330  -KB  0930  -ML     SLP Stop Time  1400  -KB  1000  -ML     SLP Time Calculation (min)  30 min  -KB  30 min  -ML     SLP Received On  08/08/19  -KB  08/08/19  -ML       User Key  (r) = Recorded By, (t) = Taken By, (c) = Cosigned By    Initials Name Provider Type    KB Bruton, Katherine L Speech and Language Pathologist    Patsy Barber, MS CCC-SLP Speech and Language Pathologist        Therapy Charges for Today     Code Description Service Date Service Provider Modifiers Qty    10020269553 HC ST TREATMENT SPEECH 2 8/7/2019 Bruton, Katherine L GN 1    76803630157 HC ST TREATMENT SWALLOW 2 8/7/2019 Bruton, Katherine L GN 1    35804657042 HC ST TREATMENT SPEECH 2 8/8/2019 Bruton, Katherine L GN 1           ADULT NOMS (last 72 hours)      Adult NOMS     Row Name 08/07/19 1200                   Adult FCM Scores    FCM Chosen  Swallowing  -KB        Swallowing FCM Score  4  -KB          User Key  (r) = Recorded By, (t) = Taken By, (c) = Cosigned By    Initials Name Effective Dates    KB Bruton, Katherine L 03/07/18 -         Katherine L Bruton  8/8/2019

## 2019-08-08 NOTE — PROGRESS NOTES
Inpatient Rehabilitation Functional Measures Assessment    Functional Measures  KATI Eating:  Branch  [facility] Grooming: Branch  [facility] Bathing:  Branch  [facility] Upper Body Dressing:  Branch  [facility] Lower Body Dressing:  Branch  [facility] Toileting:  Toileting Score = 4.  Patient requires minimal assistance for  toileting, such as steadying for balance while cleansing or adjusting clothes.  No assistive devices were required.    KATI Bladder Management  Level of Assistance:  Bladder Score = 2. Patient performs 25-49% of tasks and  requires maximal assistance for bladder management. [place] provides most of the  assist to apply AND remove brief.  Frequency/Number of Accidents this Shift:  Bladder accidents this shift:  0 .  Patient has not had an accident this shift.    KATI Bowel Management  Level of Assistance: Activity was not observed.  Frequency/Number of Accidents this Shift: [facility] Bed/Chair/Wheelchair Transfer:  Activity was not observed.  KATI Toilet Transfer:  Toilet Transfer Score = 4.  Patient performs 75% or more  of effort and minimal assistance (little/incidental help/steadying) for  transferring to and from the toilet/commode, requiring: Steadying. Contact  guard. Hand placement. Patient requires the following assistive device(s):  Contact guard assist .  KATI Tub/Shower Transfer:  Branch    Previously Documented Mode of Locomotion at Discharge: Field  KATI Expected Mode of Locomotion at Discharge: [facility] Walk/Wheelchair:  [facility] Stairs:  [facility] Comprehension:  [facility] Expression:  [facility] Social Interaction:  [facility] Problem Solving:  [facility] Memory:  [place]    Therapy Mode Minutes  Occupational Therapy: Branch  Physical Therapy: Branch  Speech Language Pathology:  Branch    Signed by: NUNU Ulloa

## 2019-08-08 NOTE — PLAN OF CARE
Problem: Skin Injury Risk (Adult)  Goal: Skin Health and Integrity  Outcome: Ongoing (interventions implemented as appropriate)      Problem: Fall Risk (Adult)  Goal: Absence of Fall  Outcome: Ongoing (interventions implemented as appropriate)      Problem: Patient Care Overview  Goal: Plan of Care Review  Outcome: Ongoing (interventions implemented as appropriate)   08/07/19 1435 08/08/19 1218   Patient Care Overview   Progress, Functional Goals demonstrating adequate progress --    OTHER   Outcome Summary --  Pt is starting to comminuicate more and is attending all therapies.      Goal: Individualization and Mutuality  Outcome: Ongoing (interventions implemented as appropriate)    Goal: Discharge Needs Assessment  Outcome: Ongoing (interventions implemented as appropriate)    Goal: Home Safety Plan  Outcome: Ongoing (interventions implemented as appropriate)    Goal: Coping Plan  Outcome: Ongoing (interventions implemented as appropriate)    Goal: Community Reintegration Plan  Outcome: Ongoing (interventions implemented as appropriate)      Problem: Stroke (IRF) (Adult)  Goal: Promote Optimal Functional [place]  Outcome: Ongoing (interventions implemented as appropriate)

## 2019-08-08 NOTE — PROGRESS NOTES
Inpatient Rehabilitation Functional Measures Assessment and Plan of Care    Plan of Care  Updated Problems/Interventions  Field    Functional Measures  KATI Eating:  [facility] Grooming: [facility] Bathing:  [facility] Upper Body Dressing:  [facility] Lower Body Dressing:  [facility] Toileting:  [facility] Bladder Management  Level of Assistance:  [place]  Frequency/Number of Accidents this Shift:  [facility] Bowel Management  Level of Assistance: [place]  Frequency/Number of Accidents this Shift: [facility] Bed/Chair/Wheelchair Transfer:  [facility] Toilet Transfer:  [facility] Tub/Shower Transfer:  [place]    Previously Documented Mode of Locomotion at Discharge: Field  KATI Expected Mode of Locomotion at Discharge: [facility] Walk/Wheelchair:  [facility] Stairs:  [facility] Comprehension:  Both ( auditory and visual) modes of comprehension are used  equally. Patient does not comprehend complex/abstract information in their  primary language without assistance from a helper. Comprehension Score = 1,  Total Assistance.  Patient understands basic daily needs less than 25% of the  time and/or does not understand simple information such as single words or  gestures. No assistive devices were required.  KATI Expression:  Both ( vocal and non-vocal) modes of expression are used  equally. Patient does not express complex/abstract information in their primary  language without a helper. Expression Score = 1, Total Assistance. Patient  expresses basic daily needs less than 25% of the time, or does not express basic  needs appropriately or consistently despite prompting. No assistive devices were  required.  KATI Social Interaction:  Social Interaction Score = 1, Total Assistance. Patient  interacts appropriately less than 25% of the time.  Patient requires total  assistance (directing all or almost all of the time) for the following  behavior(s):  KATI Problem Solving:  Patient does not make appropriate  decisions in order to  solve complex problems without assistance from a helper. Problem Solving Score =  1, Total Assistance. Patient makes appropriate decisions in order to solve  routine problems less than 25% of the time. Patient requires total direction for  the following behavior(s):  KATI Memory:  Activity was not observed.    Therapy Mode Minutes  Occupational Therapy: Branch  Physical Therapy: Branch  Speech Language Pathology:  Branch    Signed by: Martha Smith RN

## 2019-08-08 NOTE — THERAPY TREATMENT NOTE
Inpatient Rehabilitation - Speech Language Pathology Treatment Note  [facility]     Patient Name: Darby Workman  : 1945  MRN: [id number]  Today's Date: 2019         Admit Date: 2019    Visit Dx:    No diagnosis found.  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)   • Right-sided headache   • Acute ischemic stroke (CMS/HCC)   • Embolic stroke (CMS/HCC)   • Cerebral infarction due to stenosis of left carotid artery (CMS/HCC)   • Anticoagulated by anticoagulation treatment   • Stroke (cerebrum) (CMS/HCC)        Therapy Treatment  Rehabilitation Treatment Summary     Row Name                Wound 19 1015 Left neck incision    Wound - Properties Group Date first assessed: 19 [LD] Time first assessed: 101 [LD] Side: Left [LD] Location: neck [LD] Type: incision [LD] Recorded by:  [LD] Martha Foster RN 19 1015      User Key  (r) = Recorded By, (t) = Taken By, (c) = Cosigned By    Initials Name Effective Dates Discipline    LD Martha Foster, RN 16 -  Nurse          EDUCATION  The patient has been educated in the following areas:   Communication Impairment.    SLP Recommendation and Plan                         SLP GOALS     Row Name 19 0930 19 1130 19 0930       Oral Nutrition/Hydration Goal 2 (SLP)    Oral Nutrition/Hydration Goal 2, SLP  --  Pt will tolerate fork-mashed, NTL without s/s pen/asp.   -KB  --    Time Frame (Oral Nutrition/Hydration Goal 2, SLP)  --  by discharge  -KB  --    Barriers (Oral Nutrition/Hydration Goal 2, SLP)  --  Patient is consistently tolerating puree/HTL for meals at this time. Trials of NTL, TL, puree, mech soft, mixed consistency, and regular solids were tested this date. Right facial weakness persists, which resulted in mild oal residue in the right  buccal cavity, which patient was able to clear with cues lingual sweep and liquid wash. Slow but adequate mastication observed with regular solid, mild lingual residue cleared with liquid wash. No overt s/s pen/asp with any consistencies tested, including mixed consistency, NTL via cup/straw, and TL via cup/straw. Plan to observe patient with test tray of [facility] soft, TL next date.   -KB  --    Progress/Outcomes (Oral Nutrition/Hydration Goal 2, SLP)  --  goal met goal modified  -KB  --       Words/Phrases/Sentences Goal 1 (SLP)    Improve Ability to Comprehend Words/Phrases/Sentences Through: Goal 1 (SLP)  identify body part;independently (over 90% accuracy)  -ML  --  --    Time Frame (Identify Objects and Pictures Goal 1, SLP)  by discharge  -ML  --  --    Progress (Ability to Contruct Words/Phrases/Sentences Goal 1, SLP)  80%  -ML  --  with moderate cues (50-74%)  -KB    Progress/Outcomes (Identify Objects and Pictures Goal 1, SLP)  good progress toward goal;goal ongoing  -ML  --  good progress toward goal;goal ongoing  -KB    Comment (Words/Phrases/Sentences Goal 1, SLP)  --  --  60% identifying named body parts, 100% following direct model  -KB       Reading Comprehension of Basic Signs and Letters Goal 1 (SLP)    Progress (Reading Comprehension of Basic Signs and Letters Goal 1, SLP)  --  --  with moderate cues (50-74%);with maximum cues (25-49%)  -KB    Progress/Outcomes (Reading Comprehension of Basic Signs and Letters Goal 1, SLP)  --  --  goal ongoing  -KB    Comment (Reading Comprehension of Basic Signs and Letters Goal 1, SLP)  --  --  53% sequencing letters A-M, 100% with mod-max cues  -KB       Word Retrieval Skills Goal 1 (SLP)    Improve Word Retrieval Skills By Goal 1 (SLP)  completing automatic speech task, counting;completing automatic speech task, alphabet;confrontational naming task;simple;repeating sounds;repeating words Row Row Row Your Boat  -ML  --  --    Time Frame (Word Retrieval Goal 1,  SLP)  by discharge  -ML  --  --    Progress (Word Retrieval Skills Goal 1, SLP)  with maximum cues (25-49%)  -ML  --  --    Progress/Outcomes (Word Retrieval Goal 1, SLP)  goal ongoing  -ML  --  --    Comment (Word Retrieval Goal 1, SLP)  30% of counting 1-10 with max cues, 10-20% of ABC's and Row Row Row Your Boat with max cues ( pt [id number]% of svetlana), 3/5 repeating CV combinations with max cues, 0/3 repeating VC combinations with max cues, 5/5 vowels with max cues, 2/5 confrontational naming simple objects with max cues and repetition  -ML  --  --       Word Retrieval Skills Goal 2 (SLP)    Improve Word Retrieval Skills By Goal 2 (SLP)  -- using communication board  -ML  --  --    Time Frame (Word Retrieval Goal 2, SLP)  by discharge  -ML  --  --    Progress (Word Retrieval Skills Goal 2, SLP)  independently (over 90% accuracy)  -ML  --  --    Progress/Outcomes (Word Retrieval Goal 2, SLP)  goal ongoing  -ML  --  --    Comment (Word Retrieval Goal 2, SLP)  3/6 accurate  -ML  --  --       Graphic Expression of Shapes, Letters, Numbers Goal 1 (SLP)    Improve Graphic Expression of Shapes, Letters, and Numbers Goal 1 (SLP)  copy shapes, numbers, and letters;independently (over 90% accuracy)  -ML  --  --    Time Frame (Graphic Expression of Shapes, Letters, and Numbers Goal 1, SLP)  by discharge  -ML  --  --    Progress (Graphic Expression of Shapes, Letters, and Numbers Goal 1, SLP)  with maximum cues (25-49%) 1/3  -ML  --  --    Progress/Outcomes (Graphic Expression of Shapes, Letters, and Numbers Goal 1, SLP)  goal ongoing  -ML  --  --       Graphic Expression of Words Goal 1 (SLP)    Graphic Expression of Single Words Goal 1 (SLP)  copy word  -ML  --  --    Time Frame (Graphic Expression of Single Words Goal 1, SLP)  by discharge  -ML  --  --    Progress (Graphic Expression of Single Words Goal 1, SLP)  with 1:1 supervision/constant cues  -ML  --  --    Progress/Outcomes (Graphic Expression of Single Words  Goal 1, SLP)  goal ongoing  -ML  --  --    Comment (Graphic Expression of Single Words Goal 1, SLP)  0/3, pt wrote initial letter of 2/3 but then non-intelligible  -ML  --  --       Planning and Execution of Connected Speech Goal 1 (SLP)    Progress (Planning and Execution of Connected Speech Goal 1, SLP)  --  --  with moderate cues (50-74%);with maximum cues (25-49%)  -KB    Progress/Outcomes (Planning and Execution of Connected Speech Goal 1, SLP)  --  --  good progress toward goal;goal ongoing  -KB    Comment (Planning and Execution of Connected Speech Goal 1, SLP)  --  --  38% imitating functional CV syllable words, 63% with mod cues  -KB       Additional Goal 1 (SLP)    Barriers (Additional Goal 1, SLP)  --  --  100% matching like numbers from one side of the page to the other after initial model for instruction  -KB    Progress/Outcomes (Additional Goal 1, SLP)  --  --  goal ongoing  -KB    Row Name 08/06/19 1230 08/06/19 0930 08/05/19 1400      User Key  (r) = Recorded By, (t) = Taken By, (c) = Cosigned By    Initials Name Provider Type    KB Bruton, Katherine L Speech and Language Pathologist    Patsy Barber MS CCC-SLP Speech and Language Pathologist           SLP Outcome Measures (last 72 hours)      SLP Outcome Measures     Row Name 08/07/19 1200             SLP Outcome Measures    Outcome Measure Used?  Adult NOMS  -KB         Adult FCM Scores    FCM Chosen  Swallowing  -KB      Swallowing FCM Score  4  -KB        User Key  (r) = Recorded By, (t) = Taken By, (c) = Cosigned By    Initials Name Effective Dates    KB Bruton, Katherine L 03/07/18 -             Time Calculation:     Time Calculation- SLP     Row Name 08/08/19 1018             Time Calculation- SLP    SLP Start Time  0930  -ML      SLP Stop Time  1000  -ML      SLP Time Calculation (min)  30 min  -ML      SLP Received On  08/08/19  -ML        User Key  (r) = Recorded By, (t) = Taken By, (c) = Cosigned By    Initials Name Provider  Type    ML Patsy Gregg MS CCC-SLP Speech and Language Pathologist          Therapy Charges for Today     Code Description Service Date Service Provider Modifiers Qty    [id number]  ST TREATMENT SPEECH 2 8/8/2019 Patsy Gregg, MS CCC-SLP GN 1             ADULT NOMS (last 72 hours)      Adult NOMS     Row Name 08/07/19 1200                   Adult FCM Scores    FCM Chosen  Swallowing  -KB        Swallowing FCM Score  4  -KB          User Key  (r) = Recorded By, (t) = Taken By, (c) = Cosigned By    Initials Name Effective Dates    KB Bruton, Katherine L 03/07/18 -                  Patsy Gregg MS CCC-SLP  8/8/2019

## 2019-08-08 NOTE — PROGRESS NOTES
Occupational Therapy: Individual: 60 minutes.    Physical Therapy: Branch    Speech Language Pathology:  Branch    Signed by: Laura Harmon OTR/HILLARY

## 2019-08-08 NOTE — THERAPY TREATMENT NOTE
Inpatient Rehabilitation - Occupational Therapy Progress Note    [facility]     Patient Name: Darby Workman  : 1945  MRN: [id number]    Today's Date: 2019                 Admit Date: 2019      Visit Dx:  No diagnosis found.    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)   • Right-sided headache   • Acute ischemic stroke (CMS/HCC)   • Embolic stroke (CMS/HCC)   • Cerebral infarction due to stenosis of left carotid artery (CMS/HCC)   • Anticoagulated by anticoagulation treatment   • Stroke (cerebrum) (CMS/HCC)         Therapy Treatment    IRF Treatment Summary     Row Name 19 1500 19 0930 19 0850       Evaluation/Treatment Time and Intent    Subjective Information  --  no complaints  -ML  no complaints  -MD    Existing Precautions/Restrictions  fall  -LE  --  fall  -MD    Document Type  therapy note (daily note)  -LE  therapy note (daily note)  -ML  therapy note (daily note)  -MD    Mode of Treatment  individual therapy;occupational therapy  -LE  speech-language pathology  -ML  physical therapy  -MD    Patient/Family Observations  pt up in chair at nsg station.  dressed in street clothes/shoes.  pt trying to express self in am session.  putting feet down to prevent OT from moving w/c.  refuses bath and changing clothes.  PM session pt agreeable, still with frustration not being able to communicate  -LE  Pt alert, pleasant, and cooperative with therapy. Pt seen in SLP office sitting in wheelchair.   -ML  Pt sitting in WC showing no signs of acute distress.  -MD    Start Time (Evaluation/Treatment)  --  0930  -ML  --    Stop Time (Evaluation/Treatment)  --  1000  -ML  --    Recorded by [LE] Laura Harmon, OTR [ML] Patsy Gregg, MS CCC-SLP [MD] Mira Chadwick PT    Row Name 19 1500 19 0828           Cognition/Psychosocial- PT/OT    Affect/Mental Status (Cognitive)  unable/difficult to assess  -LE  --     Orientation Status (Cognition)  --  unable/difficult to assess  -MD     Follows Commands (Cognition)  --  follows one step commands;25-49% accuracy;0-24% accuracy;delayed response/completion;increased processing time needed;initiation impaired;repetition of directions required;verbal cues/prompting required  -MD     Personal Safety Interventions  --  fall prevention program maintained;gait belt  -MD     Recorded by [LE] Laura Harmon OTR [MD] Mira Chadwick, PT     Row Name 08/08/19 1500             Bed Mobility Assessment/Treatment    Comment (Bed Mobility)  -- up in chair both sessions  -LE      Recorded by [LE] Laura Harmon OTR      Row Name 08/08/19 1500 08/08/19 0850          Sit-Stand Transfer    Sit-Stand [place] (Transfers)  contact guard  -LE  contact guard  -MD     Recorded by [LE] Laura Harmon OTR [MD] Mira Chadwick, PT     Row Name 08/08/19 1500 08/08/19 0850          Stand-Sit Transfer    Stand-Sit [place] (Transfers)  contact guard  -BEVERLY  contact guard  -MD     Recorded by [LE] Laura Harmon OTR [MD] Mira Chadwick, PT     Row Name 08/08/19 1500             Toilet Transfer    Type (Toilet Transfer)  stand pivot/stand step  -LE      [place] Level (Toilet Transfer)  contact guard  -BEVERLY      Assistive Device (Toilet Transfer)  grab bars/safety frame  -LE      Recorded by [LE] Laura Harmon OTR      Row Name 08/08/19 1500             Shower Transfer    Assistive Device (Shower Transfer)  -- refused bath today  -LE      Recorded by [LE] Laura Harmon OTR      Row Name 08/08/19 0850             Gait/Stairs Assessment/Training    [place] Level (Gait)  contact guard  -MD      Distance in Feet (Gait)  200x2 and 160` x1  -MD      Pattern (Gait)  step-through  -MD      Deviations/Abnormal Patterns (Gait)  festinating/shuffling;eliza decreased  -MD      Bilateral Gait Deviations  forward  flexed posture;heel strike decreased  -MD      Recorded by [MD] Mira Chadwick, PT      Row Name 08/08/19 1500             Bathing Assessment/Treatment    Comment (Bathing)  -- notify aid pt refusing bath.    -LE      Recorded by [LE] Laura Harmon OTR      Row Name 08/08/19 1500             Upper Body Dressing Assessment/Treatment    Comment (Upper Body Dressing)  -- other twice to change clothes, pt declined  -LE      Recorded by [LE] Laura Harmon OTR      Row Name 08/08/19 1500             Lower Body Dressing Assessment/Treatment    Comment (Lower Body Dressing)  Phys therapy put jeans on pt prior to PT session. decline change clothes  -LE      Recorded by [LE] Laura Harmon OTR      Row Name 08/08/19 1500             Grooming Assessment/Treatment    Grooming [place] Level  oral care regimen;wash face, hands;moderate assist (50% patient effort)  -LE      Grooming Position  sink side;supported sitting  -LE      Comment (Grooming)  washes face set up, gesture and cues.  pt holds toothbrush backward to place in mouth, dependent to apply paste.  OT terminates task due concern pt would try to swallow instead of spit   -LE      Recorded by [LE] Laura Harmon OTR      Row Name 08/08/19 1500             Toileting Assessment/Treatment    Toileting [place] Level  moderate assist (50% patient effort)  -LE      Assistive Device Use (Toileting)  grab bar/safety frame  -LE      Toileting Position  unsupported sitting  -LE      Comment (Toileting)  assist to fasten jeans.  pt completes heyeine with set up. CGA balance assist assist pt  front of mirror for input to fasten jeans  -LE      Recorded by [LE] Laura Harmon OTR      Row Name 08/08/19 1500 08/08/19 0850          Pain Scale: Numbers Pre/Post-Treatment    Pain Scale: Numbers, Pretreatment  -- no indication of pain  -LE  0/10 - no pain  -MD     Recorded by [LE] Laura Harmon OTR [MD] Mira Chadwick, PT     Row Name 08/08/19 0850             Standing Balance  Activity    Activities Performed (Standing, Balance Training)  standing ball toss tossing bean bags at clown   -MD      Support Needed for Balance (Standing, Balance Training)  CGA;minimal external support for balance, 75% patient effort  -MD      Recorded by [MD] Mira Chadwick PT      Row Name 08/08/19 0850             Dynamic Balance Activity    Therapeutic Training Performed (Dynamic Balance)  backward walking  -MD      Support Needed for Balance (Dynamic Balance Training)  minimal external support for balance, 75% patient effort  -MD      Comment (Dynamic Balance Training)  stepping over hurddles in // bars w CGA.  Side stepping over hurddles in // bars w Min A.  -MD      Recorded by [MD] Mira Chadwick PT      Row Name 08/08/19 0850             Lower Extremity Standing Therapeutic Exercise    Performed, Standing Lower Extremity (Therapeutic Exercise)  heel raises  -MD      Device, Standing Lower Extremity (Therapeutic Exercise)  neftali bars  -MD      Exercise Type, Standing Lower Extremity (Therapeutic Exercise)  AROM (active range of motion)  -MD      Sets/Reps Detail, Standing Lower Extremity (Therapeutic Exercise)  1/10  -MD      Recorded by [MD] Mira Chadwick PT      Row Name 08/08/19 1500             Neuromuscular Re-education    Comment (Neuromuscular Re-education)  -- attends to puzzle with mod/max difficulty 2 step matching  -BEVERLY      Recorded by [BEVERLY] Laura Harmon OTR      Row Name 08/08/19 1500 08/08/19 0850          Positioning and Restraints    Pre-Treatment Position  sitting in chair/recliner  -BEVERLY  sitting in chair/recliner  -MD     Post Treatment Position  wheelchair  -BEVERLY  wheelchair  -MD     In Wheelchair  sitting;call light within reach;exit alarm on;patient within staff view at nursing station  -BEVERLY  with OT  -MD     Recorded by [BEVERLY] Laura Harmon OTR [MD] Mira Chadwick PT       User Key  (r) = Recorded By, (t) = Taken By, (c) = Cosigned By    Initials Name Effective Dates    Laura Torres OTR 06/08/18 -      Mira Vaca, PT 04/03/18 -     ML Patsy Gregg, MS CCC-SLP 10/04/18 -           Wound 07/17/19 1015 Left neck incision (Active)   Dressing Appearance open to air 8/8/2019  7:21 AM   Closure Liquid skin adhesive 8/8/2019  7:21 AM   Base dry;clean 8/8/2019  7:21 AM   Drainage Amount none 8/7/2019 10:27 PM         OT Recommendation and Plan                 OT IRF GOALS     Row Name 08/01/19 1159             Transfer Goal 1 (OT-IRF)    Activity/Assistive Device (Transfer Goal 1, OT-IRF)  toilet;shower chair;walk-in shower  -AF      [place] Level (Transfer Goal 1, OT-IRF)  verbal cues required;minimum assist (75% or more patient effort);contact guard assist  -AF      Time Frame (Transfer Goal 1, OT-IRF)  short term goal (STG)  -AF      Progress/Outcomes (Transfer Goal 1, OT-IRF)  goal ongoing  -AF         Transfer Goal 2 (OT-IRF)    Activity/Assistive Device (Transfer Goal 2, OT-IRF)  shower chair;walk-in shower;toilet  -AF      [place] Level (Transfer Goal 2, OT-IRF)  contact guard assist;verbal cues required  -AF      Time Frame (Transfer Goal 2, OT-IRF)  long term goal (LTG)  -AF      Progress/Outcomes (Transfer Goal 2, OT-IRF)  goal ongoing  -AF         Bathing Goal 1 (OT-IRF)    Activity/Device (Bathing Goal 1, OT-IRF)  bathing skills, all;grab bar/tub rail;hand-held shower spray hose;shower chair  -AF      [place] Level (Bathing Goal 1, OT-IRF)  minimum assist (75% or more patient effort);verbal cues required  -AF      Time Frame (Bathing Goal 1, OT-IRF)  short term goal (STG)  -AF      Progress/Outcomes (Bathing Goal 1, OT-IRF)  goal ongoing  -AF         Bathing Goal 2 (OT-IRF)    Activity/Device (Bathing Goal 2, OT-IRF)  bathing skills, all;grab bar/tub rail;hand-held shower spray hose;shower chair  -AF      [place] Level (Bathing Goal 2, OT-IRF)  contact guard assist;verbal cues required  -AF      Time Frame (Bathing Goal 2, OT-IRF)  long term goal (LTG)  -AF       Progress/Outcomes (Bathing Goal 2, OT-IRF)  goal ongoing  -AF         UB Dressing Goal 1 (OT-IRF)    Activity/Device (UB Dressing Goal 1, OT-IRF)  upper body dressing  -AF      [place] (UB Dress Goal 1, OT-IRF)  set-up required  -AF      Time Frame (UB Dressing Goal 1, OT-IRF)  long term goal (LTG)  -AF      Progress/Outcomes (UB Dressing Goal 1, OT-IRF)  goal ongoing  -AF         LB Dressing Goal 1 (OT-IRF)    Activity/Device (LB Dressing Goal 1, OT-IRF)  lower body dressing  -AF      [place] (LB Dressing Goal 1, OT-IRF)  moderate assist (50-74% patient effort);verbal cues required  -AF      Time Frame (LB Dressing Goal 1, OT-IRF)  short term goal (STG)  -AF      Progress/Outcomes (LB Dressing Goal 1, OT-IRF)  goal ongoing  -AF         LB Dressing Goal 2 (OT-IRF)    Activity/Device (LB Dressing Goal 2, OT-IRF)  lower body dressing  -AF      [place] (LB Dressing Goal 2, OT-IRF)  contact guard assist;verbal cues required  -AF      Time Frame (LB Dressing Goal 2, OT-IRF)  long term goal (LTG)  -AF      Progress/Outcomes (LB Dressing Goal 2, OT-IRF)  goal ongoing  -AF         Grooming Goal 1 (OT-IRF)    Activity/Device (Grooming Goal 1, OT-IRF)  grooming skills, all  -AF      [place] (Grooming Goal 1, OT-IRF)  minimum assist (75% or more patient effort);verbal cues required  -AF      Time Frame (Grooming Goal 1, OT-IRF)  short term goal (STG)  -AF      Progress/Outcomes (Grooming Goal 1, OT-IRF)  goal ongoing  -AF         Grooming Goal 2 (OT-IRF)    Activity/Device (Grooming Goal 2, OT-IRF)  grooming skills, all  -AF      [place] (Grooming Goal 2, OT-IRF)  supervision required;verbal cues required  -AF      Time Frame (Grooming Goal 2, OT-IRF)  long term goal (LTG)  -AF      Progress/Outcomes (Grooming Goal 2, OT-IRF)  goal ongoing  -AF         Toileting Goal 1 (OT-IRF)    Activity/Device (Toileting Goal 1, OT-IRF)  toileting skills, all;grab bar/safety frame;raised toilet seat  -AF       [place] Level (Toileting Goal 1, OT-IRF)  moderate assist (50-74% patient effort);verbal cues required  -AF      Time Frame (Toileting Goal 1, OT-IRF)  short term goal (STG)  -AF      Progress/Outcomes (Toileting Goal 1, OT-IRF)  goal ongoing  -AF         Toileting Goal 2 (OT-IRF)    Activity/Device (Toileting Goal 2, OT-IRF)  toileting skills, all;grab bar/safety frame;raised toilet seat  -AF      [place] Level (Toileting Goal 2, OT-IRF)  verbal cues required;contact guard assist  -AF      Time Frame (Toileting Goal 2, OT-IRF)  long term goal (LTG)  -AF      Progress/Outcomes (Toileting Goal 2, OT-IRF)  goal ongoing  -AF         Caregiver Training Goal 1 (OT-IRF)    Caregiver Training Goal 1 (OT-IRF)  Family and patient to demo safe technique with ADLs, transfers, HEP and adaptive equipment as needed   -AF      Time Frame (Caregiver Training Goal 1, OT-IRF)  long term goal (LTG)  -AF      Progress/Outcomes (Caregiver Training Goal 1, OT-IRF)  goal ongoing  -AF        User Key  (r) = Recorded By, (t) = Taken By, (c) = Cosigned By    Initials Name Provider Type    Ingris Youssef OTR Occupational Therapist                     Time Calculation:     Time Calculation- OT     Row Name 08/08/19 1523 08/08/19 1522          Time Calculation- OT    OT Start Time  1430  -LE  0830  -LE     OT Stop Time  1500  -LE  0900  -LE     OT Time Calculation (min)  30 min  -LE  30 min  -LE     Total Timed Code Minutes- OT  30 minute(s)  -LE  30 minute(s)  -LE       User Key  (r) = Recorded By, (t) = Taken By, (c) = Cosigned By    Initials Name Provider Type    Laura Torres OTKEVIN Occupational Therapist          Therapy Charges for Today     Code Description Service Date Service Provider Modifiers Qty    [id number]  OT SELF CARE/MGMT/TRAIN EA 15 MIN 8/8/2019 Laura Harmon OTR GO 2    [id number]  OT THERAPEUTIC ACT EA 15 MIN 8/8/2019 Laura Harmon OTR GO 2                   JOHN Green  8/8/2019

## 2019-08-09 LAB
ANION GAP SERPL CALCULATED.3IONS-SCNC: 11.1 MMOL/L (ref 5–15)
BASOPHILS # BLD AUTO: 0.05 10*3/MM3 (ref 0–0.2)
BASOPHILS NFR BLD AUTO: 0.7 % (ref 0–1.5)
BUN BLD-MCNC: 15 MG/DL (ref 8–23)
BUN/CREAT SERPL: 22.4 (ref 7–25)
CALCIUM SPEC-SCNC: 8.6 MG/DL (ref 8.6–10.5)
CHLORIDE SERPL-SCNC: 99 MMOL/L (ref 98–107)
CO2 SERPL-SCNC: 27.9 MMOL/L (ref 22–29)
CREAT BLD-MCNC: 0.67 MG/DL (ref 0.57–1)
DEPRECATED RDW RBC AUTO: 58.2 FL (ref 37–54)
EOSINOPHIL # BLD AUTO: 0.08 10*3/MM3 (ref 0–0.4)
EOSINOPHIL NFR BLD AUTO: 1.1 % (ref 0.3–6.2)
ERYTHROCYTE [DISTWIDTH] IN BLOOD BY AUTOMATED COUNT: 17.2 % (ref 12.3–15.4)
GFR SERPL CREATININE-BSD FRML MDRD: 86 ML/MIN/1.73
GLUCOSE BLD-MCNC: 168 MG/DL (ref 65–99)
GLUCOSE BLDC GLUCOMTR-MCNC: 152 MG/DL (ref 70–130)
GLUCOSE BLDC GLUCOMTR-MCNC: 154 MG/DL (ref 70–130)
GLUCOSE BLDC GLUCOMTR-MCNC: 209 MG/DL (ref 70–130)
GLUCOSE BLDC GLUCOMTR-MCNC: 340 MG/DL (ref 70–130)
GLUCOSE BLDC GLUCOMTR-MCNC: 95 MG/DL (ref 70–130)
HCT VFR BLD AUTO: 30 % (ref 34–46.6)
HGB BLD-MCNC: 9.6 G/DL (ref 12–15.9)
IMM GRANULOCYTES # BLD AUTO: 0.03 10*3/MM3 (ref 0–0.05)
IMM GRANULOCYTES NFR BLD AUTO: 0.4 % (ref 0–0.5)
LYMPHOCYTES # BLD AUTO: 0.64 10*3/MM3 (ref 0.7–3.1)
LYMPHOCYTES NFR BLD AUTO: 9 % (ref 19.6–45.3)
MCH RBC QN AUTO: 29.6 PG (ref 26.6–33)
MCHC RBC AUTO-ENTMCNC: 32 G/DL (ref 31.5–35.7)
MCV RBC AUTO: 92.6 FL (ref 79–97)
MONOCYTES # BLD AUTO: 0.45 10*3/MM3 (ref 0.1–0.9)
MONOCYTES NFR BLD AUTO: 6.3 % (ref 5–12)
NEUTROPHILS # BLD AUTO: 5.85 10*3/MM3 (ref 1.7–7)
NEUTROPHILS NFR BLD AUTO: 82.5 % (ref 42.7–76)
NRBC BLD AUTO-RTO: 0 /100 WBC (ref 0–0.2)
PLATELET # BLD AUTO: 315 10*3/MM3 (ref 140–450)
PMV BLD AUTO: 9.9 FL (ref 6–12)
POTASSIUM BLD-SCNC: 3.4 MMOL/L (ref 3.5–5.2)
RBC # BLD AUTO: 3.24 10*6/MM3 (ref 3.77–5.28)
SODIUM BLD-SCNC: 138 MMOL/L (ref 136–145)
WBC NRBC COR # BLD: 7.1 10*3/MM3 (ref 3.4–10.8)

## 2019-08-09 PROCEDURE — 97110 THERAPEUTIC EXERCISES: CPT

## 2019-08-09 PROCEDURE — 80048 BASIC METABOLIC PNL TOTAL CA: CPT | Performed by: PHYSICAL MEDICINE & REHABILITATION

## 2019-08-09 PROCEDURE — 97535 SELF CARE MNGMENT TRAINING: CPT

## 2019-08-09 PROCEDURE — 92507 TX SP LANG VOICE COMM INDIV: CPT

## 2019-08-09 PROCEDURE — 63710000001 INSULIN REGULAR HUMAN PER 5 UNITS: Performed by: PHYSICAL MEDICINE & REHABILITATION

## 2019-08-09 PROCEDURE — 82962 GLUCOSE BLOOD TEST: CPT

## 2019-08-09 PROCEDURE — 85025 COMPLETE CBC W/AUTO DIFF WBC: CPT | Performed by: PHYSICAL MEDICINE & REHABILITATION

## 2019-08-09 RX ADMIN — HYDRALAZINE HYDROCHLORIDE 10 MG: 10 TABLET, FILM COATED ORAL at 05:58

## 2019-08-09 RX ADMIN — LANSOPRAZOLE 30 MG: KIT at 07:21

## 2019-08-09 RX ADMIN — GLIPIZIDE 5 MG: 5 TABLET ORAL at 07:21

## 2019-08-09 RX ADMIN — NYSTATIN 500000 UNITS: 100000 SUSPENSION ORAL at 12:01

## 2019-08-09 RX ADMIN — NEBIVOLOL HYDROCHLORIDE 20 MG: 10 TABLET ORAL at 21:09

## 2019-08-09 RX ADMIN — BRIMONIDINE TARTRATE 1 DROP: 2 SOLUTION OPHTHALMIC at 21:09

## 2019-08-09 RX ADMIN — LOSARTAN POTASSIUM: 50 TABLET, FILM COATED ORAL at 08:10

## 2019-08-09 RX ADMIN — DORZOLAMIDE HYDROCHLORIDE 1 DROP: 20 SOLUTION/ DROPS OPHTHALMIC at 08:10

## 2019-08-09 RX ADMIN — HYDRALAZINE HYDROCHLORIDE 10 MG: 10 TABLET, FILM COATED ORAL at 21:09

## 2019-08-09 RX ADMIN — INSULIN HUMAN 3 UNITS: 100 INJECTION, SOLUTION PARENTERAL at 08:09

## 2019-08-09 RX ADMIN — METFORMIN HYDROCHLORIDE 850 MG: 850 TABLET ORAL at 17:21

## 2019-08-09 RX ADMIN — GLIPIZIDE 5 MG: 5 TABLET ORAL at 17:21

## 2019-08-09 RX ADMIN — NYSTATIN 500000 UNITS: 100000 SUSPENSION ORAL at 17:21

## 2019-08-09 RX ADMIN — APIXABAN 5 MG: 5 TABLET, FILM COATED ORAL at 08:11

## 2019-08-09 RX ADMIN — BRIMONIDINE TARTRATE 1 DROP: 2 SOLUTION OPHTHALMIC at 08:10

## 2019-08-09 RX ADMIN — LANSOPRAZOLE 30 MG: KIT at 17:24

## 2019-08-09 RX ADMIN — ATORVASTATIN CALCIUM 80 MG: 80 TABLET, FILM COATED ORAL at 21:09

## 2019-08-09 RX ADMIN — METFORMIN HYDROCHLORIDE 850 MG: 850 TABLET ORAL at 08:10

## 2019-08-09 RX ADMIN — AMANTADINE HYDROCHLORIDE 100 MG: 100 CAPSULE ORAL at 06:00

## 2019-08-09 RX ADMIN — ASPIRIN 325 MG: 325 TABLET ORAL at 08:10

## 2019-08-09 RX ADMIN — NYSTATIN 500000 UNITS: 100000 SUSPENSION ORAL at 21:09

## 2019-08-09 RX ADMIN — APIXABAN 5 MG: 5 TABLET, FILM COATED ORAL at 21:09

## 2019-08-09 RX ADMIN — NYSTATIN 500000 UNITS: 100000 SUSPENSION ORAL at 08:10

## 2019-08-09 RX ADMIN — HYDRALAZINE HYDROCHLORIDE 10 MG: 10 TABLET, FILM COATED ORAL at 14:43

## 2019-08-09 RX ADMIN — DORZOLAMIDE HYDROCHLORIDE 1 DROP: 20 SOLUTION/ DROPS OPHTHALMIC at 21:09

## 2019-08-09 RX ADMIN — INSULIN HUMAN 5 UNITS: 100 INJECTION, SOLUTION PARENTERAL at 12:01

## 2019-08-09 RX ADMIN — NEBIVOLOL HYDROCHLORIDE 20 MG: 10 TABLET ORAL at 08:10

## 2019-08-09 NOTE — PROGRESS NOTES
Inpatient Rehabilitation Functional Measures Assessment    Functional Measures  KATI Eating:  [facility] Grooming: [facility] Bathing:  [facility] Upper Body Dressing:  [facility] Lower Body Dressing:  [facility] Toileting:  [facility] Bladder Management  Level of Assistance:  [place]  Frequency/Number of Accidents this Shift:  [facility] Bowel Management  Level of Assistance: [place]  Frequency/Number of Accidents this Shift: [facility] Bed/Chair/Wheelchair Transfer:  [facility] Toilet Transfer:  [facility] Tub/Shower Transfer:  [place]    Previously Documented Mode of Locomotion at Discharge: Field  KATI Expected Mode of Locomotion at Discharge: [facility] Walk/Wheelchair:  [facility] Stairs:  [facility] Comprehension:  Auditory comprehension is the usual mode. Patient does not  comprehend complex/abstract information in their primary language without  assistance from a helper. Comprehension Score = 2, Maximal Prompting. Patient  comprehends basic daily needs 25-49% of the time. Patient understands simple  information via single words or gestures. Requires maximal/a lot of prompting  (most of the time). No assistive devices were required.  KATI Expression:  Vocal expression is the usual mode. Patient does not express  complex/abstract information in their primary language without a helper.  Expression Score = 2, Maximal Prompting. Patient expresses basic daily needs  25-49% of the time. Patient uses only single words or gestures and requires  maximal/a lot of prompting (most of the time). No assistive devices were  required.  KATI Social Interaction:  Activity was not observed.  KATI Problem Solving:  Patient does not make appropriate decisions in order to  solve complex problems without assistance from a helper. Problem Solving Score =  2, Maximal Direction. Patient makes appropriate decisions in order to solve  routine problems 25-49% of the time. Patient requires maximal direction for  the  following behavior(s): Impulsivity. Poor judgment.  KATI Memory:  Memory Score = 3, Moderate Prompting. Patient recognizes and  remembers 50-74% of the time. Patient requires moderate/some prompting  for  memory for the following: Disoriented to person, place, time or situation.  Difficulty remembering verbal tasks.    Therapy Mode Minutes  Occupational Therapy: [place]  Physical Therapy: [place]  Speech Language Pathology:  [place]    Signed by: Nicanor Dimas RN

## 2019-08-09 NOTE — PROGRESS NOTES
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  On questioning she does not appear to have any headache but difficult to tell with her aphasia.  No new weakness noted.       History taken from: patient chart RN    Objective     Vital Signs  Temp:  [98 °F (36.7 °C)-98.3 °F (36.8 °C)] 98 °F (36.7 °C)  Heart Rate:  [66-69] 68  Resp:  [16-18] 16  BP: (122-190)/(63-82) 130/63    Objective  Physical Exam  MENTAL STATUS -  AWAKE / ALERT  HEENT- NCAT,   SCLERA NON-ICTERIC, CONJUNCTIVA PINK, OP MOIST, NO JVD, EARS UNREMARKABLE EXTERNALLY  LUNGS - CTA, NO WHEEZES, RALES OR RHONCHI  HEART- RRR, NO RUB, MURMUR, OR GALLOP  ABD - NORMOACTIVE BOWEL SOUNDS, SOFT, NT.    EXT - NO EDEMA OR CYANOSIS  NEURO -alert.  Aphasia.  She will get occasional single word    Does not follow commands.        MOTOR EXAM -patient moves the left side more than the right but takes resistance bilaterally         Results Review:     I reviewed the patient's new clinical results.  Glucose   Date/Time Value Ref Range Status   08/09/2019 1113 209 (H) 70 - 130 mg/dL Final   08/09/2019 0705 152 (H) 70 - 130 mg/dL Final   08/08/2019 2057 104 70 - 130 mg/dL Final   08/08/2019 1625 151 (H) 70 - 130 mg/dL Final   08/08/2019 1105 256 (H) 70 - 130 mg/dL Final   08/08/2019 0720 162 (H) 70 - 130  mg/dL Final   08/07/2019 2104 193 (H) 70 - 130 mg/dL Final   08/07/2019 1621 101 70 - 130 mg/dL Final     Results from last 7 days   Lab Units 08/09/19  0729 08/07/19  0801 08/05/19  0726   WBC 10*3/mm3 7.10 7.17 5.40   HEMOGLOBIN g/dL 9.6* 9.6* 9.8*   HEMATOCRIT % 30.0* 31.3* 30.8*   PLATELETS 10*3/mm3 315 374 419       Ref. Range 5/22/2019 05:44 5/22/2019 11:34 7/9/2019 08:25 7/18/2019 04:49 7/19/2019 05:05 7/23/2019 05:56 8/1/2019 06:48   Hemoglobin Latest Ref Range: 12.0 - 15.9 g/dL 10.8 (L)  10.6 (L) 8.5 (L) 9.7 (L) 9.3 (L) 7.4 (L)   Hematocrit Latest Ref Range: 34.0 - 46.6 % 35.1  33.4 (L) 26.2 (L) 29.9 (L) 28.6 (L) 23.6 (L)     Results from last 7 days   Lab Units 08/09/19  0729 08/07/19  0801 08/05/19  0726   SODIUM mmol/L 138 139 136   POTASSIUM mmol/L 3.4* 3.6 3.4*   CHLORIDE mmol/L 99 99 99   CO2 mmol/L 27.9 27.5 27.3   BUN mg/dL 15 14 18   CREATININE mg/dL 0.67 0.69 0.87   CALCIUM mg/dL 8.6 8.9 9.1   GLUCOSE mg/dL 168* 201* 276*         Ref. Range 8/1/2019 06:47   25 Hydroxy, Vitamin D Latest Ref Range: 30.0 - 100.0 ng/ml 21.8 (L)       Ref. Range 8/1/2019 06:47   Total Cholesterol Latest Ref Range: 0 - 200 mg/dL 105   HDL Cholesterol Latest Ref Range: 40 - 60 mg/dL 40   LDL Cholesterol  Latest Ref Range: 0 - 100 mg/dL 57   VLDL Cholesterol Latest Ref Range: 5 - 40 mg/dL 8   Triglycerides Latest Ref Range: 0 - 150 mg/dL 40   Medication Review: done  Scheduled Meds:    amantadine 100 mg Oral QAM   apixaban 5 mg Oral Q12H   aspirin 325 mg Oral Daily   atorvastatin 80 mg Oral Nightly   brimonidine 1 drop Both Eyes BID   dorzolamide 1 drop Both Eyes BID   glipiZIDE 5 mg Oral BID AC   hydrALAZINE 10 mg Oral Q8H   insulin regular 0-14 Units Subcutaneous TID AC   lansoprazole 30 mg Oral BID AC   losartan-HCTZ (HYZAAR) 100-12.5 combo dose  Oral Daily   metFORMIN 850 mg Oral BID With Meals   nebivolol 20 mg Oral BID   nystatin 5 mL Swish & Spit 4x Daily   vitamin D 50,000 Units Oral Q7 Days     Continuous  Infusions:   PRN Meds:.•  aluminum-magnesium hydroxide-simethicone  •  dextrose  •  dextrose  •  glucagon (human recombinant)  •  nitroglycerin      Assessment/Plan       Stroke (cerebrum) (CMS/[facility])      Assessment & Plan  Status post left CVA with aphasia and spastic right hemiparesis    Neuro stimulation-amantadine added July 27    Dysphagia-Cortrak feeding tube discontinued on July 31 and started on puréed/honey thick liquid diet with strategies  August 7-advance to [employer] nectar thick liquid diet, consistent carbohydrate.    History of multiple bilateral strokes and left central retinal artery occlusion    History of left greater than right internal carotid artery stenosis-status post left internal carotid artery stent July 17, 2019    History of hypertension -  Hyzaar 100-25. August 4 - Bystolic increased to 20 mg daily to 20 mg bid.  August 8 - BP still runs high. On discharge in May 2019 was on Hyzaar 100-25 mg daily, Bystolic 20 mg daily, amlodipine 10 mg daily, Hydralazine 50 mg q 8 hours.  Will add Hydralazine initially 10 mg po q 8 hours and titrate up as needed.   August 9-systolic blood pressure elevated last night and early this morning but better this afternoon at 122-130.  Continue to follow recent addition of hydralazine and titrate up as needed    History of diabetes mellitus -Lantus/glipizide (home medication metformin 1000 mg twice daily and glipizide 10 mg twice daily)  August 1-blood sugars were low yesterday afternoon after tube feeds discontinued.  Held glipizide last night and this morning and Lantus last night.  Blood sugar elevated at noontime today.  Will receive resume glipizide at a lower dose of 5 mg twice a day with meals.  Continue sliding scale insulin coverage.  She also is on metformin at home.  August 5-add back metformin initially at 500 mg twice a day and continue glipizide 5 mg twice a day, both one half of previous home dose, titrate up as needed.  August 7-titrate up  metformin to 850 mg twice a day.  Add consistent carbohydrate restriction to diet.  August 9-increase metformin to 1000 g twice a day.    Stroke prophylaxis-aspirin/Eliquis/atorvastatin    Anemia-August 1-hemoglobin 7.4.  Most recent check on July 23 HGB 9.3.  Will recheck hemoglobin this afternoon.  Hemoccult stool.  Iron studies.  Reticulocyte count.  Recheck CBC in the a.m.  Added Protonix.  Patient is on aspirin and Eliquis.  With recent stroke  will look to transfuse packed red blood cell.  August 2-hemoglobin improved 9.0-1 unit packed red blood cells.  Elevated reticulocyte count in response to anemia.  Nursing describes dark stool.  Patient discussed with gastroenterology.  At this point unable to do endoscopy as on Eliquis and aspirin.  Will treat symptomatically for now with increasing proton pump inhibitor and transfusing as needed.  August 5-anemia improved-hemoglobin 9.8    DVT prophylaxis-SCDs/anticoagulation    Impulsivity-   Nursing to do re-orientation with the patient.  Room close to the nursing station.    Endocrine-vitamin D deficiency-ergocalciferol 50,000 units weekly x8 weeks added. Vitamin B12 level and TSH checked and late May unremarkable     Impaired cognition/impaired language, impaired swallow, impaired activity daily living, impaired mobility     Now admit for comprehensive acute inpatient rehabilitation .  This would be an interdisciplinary program with physical therapy 1 hour,  occupational therapy 1 hour, and speech therapy 1 hour, 5 days a week.  Rehabilitation nursing for carryover, monitoring of cardiopulmonary and neurologic   status, bowel and bladder, and skin  Ongoing physician follow-up.  Weekly team conferences.  Goals are indeterminant.   Rehabilitation prognosis determined.  Medical prognosis determined.  Estimated length of stay is indeterminate.    TEAM CONF - AUGUST 6- TRANFERS CTG. GAIT UP  FEET CTG-MIN ASSIST. UBD MIN. LBD MOD. BATH MOD. SEVERE APHASIA.  INCREASED RESISTANCE TO PARTICIPATE AT TIMES.   PUREE/HTL.  GOOD PO INTAKE. ADJUSTING MEDS FOR DIABETES MELLITUS.  BLADDER CONTINENT/INCONTIENT.   ELOS- 2 WEEKS.       Ajit Howard MD  08/09/19  3:47 PM    Time:

## 2019-08-09 NOTE — PROGRESS NOTES
Inpatient Rehabilitation Functional Measures Assessment    Functional Measures  KATI Eating:  [facility] Grooming: [facility] Bathing:  [facility] Upper Body Dressing:  [facility] Lower Body Dressing:  [place]  Inpatient Rehabilitation Plan of Care Note    Plan of Care  [facility] Toileting:  [facility] Bladder Management  Level of Assistance:  [place]  Frequency/Number of Accidents this Shift:  [facility] Bowel Management  Level of Assistance: [place]  Frequency/Number of Accidents this Shift: [facility] Bed/Chair/Wheelchair Transfer:  [facility] Toilet Transfer:  [facility] Tub/Shower Transfer:  [place]    Previously Documented Mode of Locomotion at Discharge: Field  KATI Expected Mode of Locomotion at Discharge: [facility] Walk/Wheelchair:  [facility] Stairs:  [facility] Comprehension:  Auditory comprehension is the usual mode. Patient does not  comprehend complex/abstract information in their primary language without  assistance from a helper. Comprehension Score = 2, Maximal Prompting. Patient  comprehends basic daily needs 25-49% of the time. Patient understands simple  information via single words or gestures. Requires maximal/a lot of prompting  (most of the time). Patient requires the following assistive device(s): Glasses.    KATI Expression:  Vocal expression is the usual mode. Patient does not express  complex/abstract information in their primary language without a helper.  Expression Score = 2, Maximal Prompting. Patient expresses basic daily needs  25-49% of the time. Patient uses only single words or gestures and requires  maximal/a lot of prompting (most of the time). No assistive devices were  required.  KATI Social Interaction:  Activity was not observed.  KATI Problem Solving:  Activity was not observed.  KATI Memory:  Activity was not observed.    Therapy Mode Minutes  Occupational Therapy: [place]  Physical Therapy: [place]  Speech Language Pathology:  [place]    Signed by:  Leslie Mcdonald RN

## 2019-08-09 NOTE — PLAN OF CARE
Problem: Skin Injury Risk (Adult)  Goal: Skin Health and Integrity  Outcome: Ongoing (interventions implemented as appropriate)      Problem: Fall Risk (Adult)  Goal: Absence of Fall  Outcome: Ongoing (interventions implemented as appropriate)      Problem: Patient Care Overview  Goal: Plan of Care Review  Outcome: Ongoing (interventions implemented as appropriate)   08/09/19 0234 08/09/19 1755   Patient Care Overview   IRF Plan of Care Review progress ongoing, continue --    OTHER   Outcome Summary --  Pt is starting to comunicate her needs better and has attended all therapies.      Goal: Individualization and Mutuality  Outcome: Ongoing (interventions implemented as appropriate)    Goal: Discharge Needs Assessment  Outcome: Ongoing (interventions implemented as appropriate)    Goal: Home Safety Plan  Outcome: Ongoing (interventions implemented as appropriate)    Goal: Coping Plan  Outcome: Ongoing (interventions implemented as appropriate)    Goal: Community Reintegration Plan  Outcome: Ongoing (interventions implemented as appropriate)      Problem: Stroke (IRF) (Adult)  Goal: Promote Optimal Functional [place]  Outcome: Ongoing (interventions implemented as appropriate)

## 2019-08-09 NOTE — THERAPY TREATMENT NOTE
Inpatient Rehabilitation - Speech Language Pathology Treatment Note    [facility]    Patient Name: Darby Workman  : 1945  MRN: [id number]  Today's Date: 2019      Admit Date: 2019  Visit Dx:    No diagnosis found.    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)   • Right-sided headache   • Acute ischemic stroke (CMS/HCC)   • Embolic stroke (CMS/HCC)   • Cerebral infarction due to stenosis of left carotid artery (CMS/HCC)   • Anticoagulated by anticoagulation treatment   • Stroke (cerebrum) (CMS/HCC)     Therapy Treatment  Evaluation/Coping  Evaluation/Treatment Time and Intent  Subjective Information: no complaints (19 1300 : Bruton, Katherine L)  Existing Precautions/Restrictions: fall(swallow, communication) (19 1300 : Bruton, Katherine L)  Document Type: therapy note (daily note) (19 1300 : Bruton, Katherine L)  Mode of Treatment: speech-language pathology (19 1300 : Bruton, Katherine L)  Patient/Family Observations: seated in wc; agreeable to therapy (19 1300 : Bruton, Katherine L)  Start Time (Evaluation/Treatment): 1300 (19 1300 : Bruton, Katherine L)  Stop Time (Evaluation/Treatment): 1330 (19 1300 : Bruton, Katherine L)    Vitals/Pain/Safety  Pain Scale: Numbers Pre/Post-Treatment  Pain Scale: Numbers, Pretreatment: 0/10 - no pain (19 1300 : Bruton, Katherine L)  Pain Scale: Numbers, Post-Treatment: 0/10 - no pain (19 1300 : Bruton, Katherine L)    EDUCATION  The patient has been educated in the following areas:   Communication Impairment.    SLP GOALS     Row Name 19 1300 19 0930 19 1330       Words/Phrases/Sentences Goal 1 (SLP)    Progress (Ability to Contruct Words/Phrases/Sentences Goal 1, SLP)  with moderate cues (50-74%);with maximum  cues (25-49%)  -KB  with moderate cues (50-74%)  -KB  with 1:1 supervision/constant cues  -KB    Progress/Outcomes (Identify Objects and Pictures Goal 1, SLP)  goal ongoing  -KB  good progress toward goal;goal ongoing  -KB  goal ongoing  -KB    Comment (Words/Phrases/Sentences Goal 1, SLP)  20% identifying pictured items in fo4, 60% with mod-max cues  -KB  70% identifying common objects by name in fo2; 70% identifying named body parts, 90% with model  -KB  20% identifying named body parts, 90% with direct model  -KB       Word Retrieval Skills Goal 1 (SLP)    Progress (Word Retrieval Skills Goal 1, SLP)  with maximum cues (25-49%)  -KB  with maximum cues (25-49%)  -KB  with 1:1 supervision/constant cues  -KB    Progress/Outcomes (Word Retrieval Goal 1, SLP)  goal ongoing  -KB  goal ongoing  -KB  goal ongoing  -KB    Comment (Word Retrieval Goal 1, SLP)  70% completing predictable sentences (nursery rhymes) with mod-max visual and verbal cues, approximations noted on 60%  -KB  30%, 40%, 30% counting 1-10 across three trials respectively with visual support and direct verbal model; 40%, 30%, 30% reciting alphabet across three trials respectively with visual support and direct verbal model  -KB  10%, 0%, 20% counting 1-10 with visual support and verbal model across 3 trials respectively  -KB       Graphic Expression of Shapes, Letters, Numbers Goal 1 (SLP)    Progress (Graphic Expression of Shapes, Letters, and Numbers Goal 1, SLP)  with moderate cues (50-74%)  -KB  --  --    Progress/Outcomes (Graphic Expression of Shapes, Letters, and Numbers Goal 1, SLP)  goal ongoing  -KB  --  --    Comment (Graphic Expression of Shapes, Letters, and Numbers Goal 1, SLP)  60% copying single letters  -KB  --  --       Planning and Execution of Connected Speech Goal 1 (SLP)    Progress (Planning and Execution of Connected Speech Goal 1, SLP)  with minimal cues (75-90%)  -KB  with 1:1 supervision/constant cues  -KB  with maximum cues  (25-49%)  -KB    Progress/Outcomes (Planning and Execution of Connected Speech Goal 1, SLP)  good progress toward goal;goal ongoing  -KB  goal ongoing  -KB  goal ongoing  -KB    Comment (Planning and Execution of Connected Speech Goal 1, SLP)  80% imitating functional CV words, 90% with model  -KB  0% imitating funcitonal CV words, 30% with max cues  -KB  30% imitating functional CV words, 70% with repeated model and visual cues  -KB       Augmentative/Alternative Communication Objectives Goal 1 (SLP    Progress (Augmentative/Alternative Communication Goal 1, SLP)  --  with moderate cues (50-74%)  -KB  --    Progress/Outcomes (Augmentative/Alternative Communication Goal 1, SLP)  --  good progress toward goal;goal ongoing  -KB  --    Comment (Augmentative/Alternative Communication Goal 1, SLP)  --  63% accuracy identifying named want/need items on basic picture communication board when presented with fo4 at a time  -KB  Basic picture communicaiton board left in patient's room per RN request yesterday. Educated RN both yesterday and today regarding lack of patient's ability to use this funcitonally. Demonstrated how picture communicaiton board can be used to pair with verbal language and possibly increase patient's comprehension.   -KB       Additional Goal 1 (SLP)    Barriers (Additional Goal 1, SLP)  100% matching letter to letter in fo3  -KB  --  87% matching picture to picture in fo3  -KB    Progress/Outcomes (Additional Goal 1, SLP)  goal ongoing  -KB  --  good progress toward goal;goal ongoing  -KB    Row Name 08/08/19 0930 08/07/19 1130 08/07/19 0930       Oral Nutrition/Hydration Goal 2 (SLP)    Oral Nutrition/Hydration Goal 2, SLP  --  Pt will tolerate fork-mashed, NTL without s/s pen/asp.   -KB  --    Time Frame (Oral Nutrition/Hydration Goal 2, SLP)  --  by discharge  -KB  --    Barriers (Oral Nutrition/Hydration Goal 2, SLP)  --  Patient is consistently tolerating puree/HTL for meals at this time. Trials  of NTL, TL, puree, mech soft, mixed consistency, and regular solids were tested this date. Right facial weakness persists, which resulted in mild oal residue in the right buccal cavity, which patient was able to clear with cues lingual sweep and liquid wash. Slow but adequate mastication observed with regular solid, mild lingual residue cleared with liquid wash. No overt s/s pen/asp with any consistencies tested, including mixed consistency, NTL via cup/straw, and TL via cup/straw. Plan to observe patient with test tray of mech soft, TL next date.   -KB  --    Progress/Outcomes (Oral Nutrition/Hydration Goal 2, SLP)  --  goal met goal modified  -KB  --       Words/Phrases/Sentences Goal 1 (SLP)    Improve Ability to Comprehend Words/Phrases/Sentences Through: Goal 1 (SLP)  identify body part;independently (over 90% accuracy)  -ML  --  --    Time Frame (Identify Objects and Pictures Goal 1, SLP)  by discharge  -ML  --  --    Progress (Ability to Contruct Words/Phrases/Sentences Goal 1, SLP)  80%  -ML  --  with moderate cues (50-74%)  -KB    Progress/Outcomes (Identify Objects and Pictures Goal 1, SLP)  good progress toward goal;goal ongoing  -ML  --  good progress toward goal;goal ongoing  -KB    Comment (Words/Phrases/Sentences Goal 1, SLP)  --  --  60% identifying named body parts, 100% following direct model  -KB       Reading Comprehension of Basic Signs and Letters Goal 1 (SLP)    Progress (Reading Comprehension of Basic Signs and Letters Goal 1, SLP)  --  --  with moderate cues (50-74%);with maximum cues (25-49%)  -KB    Progress/Outcomes (Reading Comprehension of Basic Signs and Letters Goal 1, SLP)  --  --  goal ongoing  -KB    Comment (Reading Comprehension of Basic Signs and Letters Goal 1, SLP)  --  --  53% sequencing letters A-M, 100% with mod-max cues  -KB       Word Retrieval Skills Goal 1 (SLP)    Improve Word Retrieval Skills By Goal 1 (SLP)  completing automatic speech task, counting;completing  automatic speech task, alphabet;confrontational naming task;simple;repeating sounds;repeating words Row Row Row Your Boat  -ML  --  --    Time Frame (Word Retrieval Goal 1, SLP)  by discharge  -ML  --  --    Progress (Word Retrieval Skills Goal 1, SLP)  with maximum cues (25-49%)  -ML  --  --    Progress/Outcomes (Word Retrieval Goal 1, SLP)  goal ongoing  -ML  --  --    Comment (Word Retrieval Goal 1, SLP)  30% of counting 1-10 with max cues, 10-20% of ABC's and Row Row Row Your Boat with max cues ( pt [id number]% of svetlana), 3/5 repeating CV combinations with max cues, 0/3 repeating VC combinations with max cues, 5/5 vowels with max cues, 2/5 confrontational naming simple objects with max cues and repetition  -ML  --  --       Word Retrieval Skills Goal 2 (SLP)    Improve Word Retrieval Skills By Goal 2 (SLP)  -- using communication board  -ML  --  --    Time Frame (Word Retrieval Goal 2, SLP)  by discharge  -ML  --  --    Progress (Word Retrieval Skills Goal 2, SLP)  independently (over 90% accuracy)  -ML  --  --    Progress/Outcomes (Word Retrieval Goal 2, SLP)  goal ongoing  -ML  --  --    Comment (Word Retrieval Goal 2, SLP)  3/6 accurate  -ML  --  --       Graphic Expression of Shapes, Letters, Numbers Goal 1 (SLP)    Improve Graphic Expression of Shapes, Letters, and Numbers Goal 1 (SLP)  copy shapes, numbers, and letters;independently (over 90% accuracy)  -ML  --  --    Time Frame (Graphic Expression of Shapes, Letters, and Numbers Goal 1, SLP)  by discharge  -ML  --  --    Progress (Graphic Expression of Shapes, Letters, and Numbers Goal 1, SLP)  with maximum cues (25-49%) 1/3  -ML  --  --    Progress/Outcomes (Graphic Expression of Shapes, Letters, and Numbers Goal 1, SLP)  goal ongoing  -ML  --  --       Graphic Expression of Words Goal 1 (SLP)    Graphic Expression of Single Words Goal 1 (SLP)  copy word  -ML  --  --    Time Frame (Graphic Expression of Single Words Goal 1, SLP)  by discharge  -ML  --   --    Progress (Graphic Expression of Single Words Goal 1, SLP)  with 1:1 supervision/constant cues  -ML  --  --    Progress/Outcomes (Graphic Expression of Single Words Goal 1, SLP)  goal ongoing  -ML  --  --    Comment (Graphic Expression of Single Words Goal 1, SLP)  0/3, pt wrote initial letter of 2/3 but then non-intelligible  -ML  --  --       Planning and Execution of Connected Speech Goal 1 (SLP)    Progress (Planning and Execution of Connected Speech Goal 1, SLP)  --  --  with moderate cues (50-74%);with maximum cues (25-49%)  -KB    Progress/Outcomes (Planning and Execution of Connected Speech Goal 1, SLP)  --  --  good progress toward goal;goal ongoing  -KB    Comment (Planning and Execution of Connected Speech Goal 1, SLP)  --  --  38% imitating functional CV syllable words, 63% with mod cues  -KB       Additional Goal 1 (SLP)    Barriers (Additional Goal 1, SLP)  --  --  100% matching like numbers from one side of the page to the other after initial model for instruction  -KB    Progress/Outcomes (Additional Goal 1, SLP)  --  --  goal ongoing  -KB      User Key  (r) = Recorded By, (t) = Taken By, (c) = Cosigned By    Initials Name Provider Type    KB Bruton, Katherine L Speech and Language Pathologist    Patsy Barber MS CCC-SLP Speech and Language Pathologist             SLP Outcome Measures (last 72 hours)      SLP Outcome Measures     Row Name 08/07/19 1200             SLP Outcome Measures    Outcome Measure Used?  Adult NOMS  -KB         Adult FCM Scores    FCM Chosen  Swallowing  -KB      Swallowing FCM Score  4  -KB        User Key  (r) = Recorded By, (t) = Taken By, (c) = Cosigned By    Initials Name Effective Dates    KB Bruton, Katherine L 03/07/18 -         Time Calculation:   Time Calculation- SLP     Row Name 08/09/19 1330 08/09/19 1000          Time Calculation- SLP    SLP Start Time  1300  -KB  0930  -KB     SLP Stop Time  1330  -KB  1000  -KB     SLP Time Calculation (min)  30  min  -KB  30 min  -KB     SLP Received On  08/09/19  -KB  08/09/19  -KB       User Key  (r) = Recorded By, (t) = Taken By, (c) = Cosigned By    Initials Name Provider Type    KB Bruton, Katherine L Speech and Language Pathologist        Therapy Charges for Today     Code Description Service Date Service Provider Modifiers Qty    85749709123 HC ST TREATMENT SPEECH 2 8/8/2019 Bruton, Katherine L GN 1    [id number] HC ST TREATMENT SPEECH 4 8/9/2019 Bruton, Katherine L GN 1           ADULT NOMS (last 72 hours)      Adult NOMS     Row Name 08/07/19 1200                   Adult FCM Scores    FCM Chosen  Swallowing  -KB        Swallowing FCM Score  4  -KB          User Key  (r) = Recorded By, (t) = Taken By, (c) = Cosigned By    Initials Name Effective Dates    KB Bruton, Katherine L 03/07/18 -         Katherine L Bruton  8/9/2019

## 2019-08-09 NOTE — PROGRESS NOTES
Inpatient Rehabilitation Functional Measures Assessment    Functional Measures  KATI Eating:  [facility] Grooming: [facility] Bathing:  [facility] Upper Body Dressing:  [facility] Lower Body Dressing:  [facility] Toileting:  [facility] Bladder Management  Level of Assistance:  [place]  Frequency/Number of Accidents this Shift:  [facility] Bowel Management  Level of Assistance: [place]  Frequency/Number of Accidents this Shift: [facility] Bed/Chair/Wheelchair Transfer:  Bed/chair/wheelchair Transfer Score = 4.  Patient performs 75% or more of effort and minimal assistance (little/incidental  help/lifting of one limb/steadying) for transferring to and from the  bed/chair/wheelchair, requiring: Contact guard. No assistive devices were  required.  KATI Toilet Transfer:  [facility] Tub/Shower Transfer:  [place]    Previously Documented Mode of Locomotion at Discharge: Field  KATI Expected Mode of Locomotion at Discharge: [facility] Walk/Wheelchair:  WHEELCHAIR OBSERVATION   Activity was not observed.    WALK OBSERVATION   Walk Distance Scale = 3.  Distance walked is greater than 150 feet. Walk Score  = 4.  Patient performs 75% or more of effort and requires minimal assistance.  Incidental help/contact guard/steadying was provided. Patient walked a distance  of  200 feet. No assistive devices were required.  KATI Stairs:  Stairs Score = 2.  Incidental assistance with lifting or lowering,  contact guard or steadying was provided. Patient performs 75% or more of effort  and requires minimal contact assistance. Patient negotiated  4 stairs. Patient  requires the following assistive device(s): Handrail(s).    KATI Comprehension:  [facility] Expression:  [facility] Social Interaction:  [facility] Problem Solving:  [facility] Memory:  [place]    Therapy Mode Minutes  Occupational Therapy: [place]  Physical Therapy: Individual: 60 minutes.  Speech Language Pathology:  [place]    Signed by: Mira Chadwick, PT

## 2019-08-09 NOTE — PLAN OF CARE
Problem: Skin Injury Risk (Adult)  Goal: Skin Health and Integrity  Outcome: Ongoing (interventions implemented as appropriate)      Problem: Fall Risk (Adult)  Goal: Absence of Fall  Outcome: Ongoing (interventions implemented as appropriate)      Problem: Patient Care Overview  Goal: Plan of Care Review  Outcome: Ongoing (interventions implemented as appropriate)   08/09/19 0234   Patient Care Overview   IRF Plan of Care Review progress ongoing, continue   Progress, Functional Goals demonstrating adequate progress   Coping/Psychosocial   Plan of Care Reviewed With patient   OTHER   Outcome Summary pt rested very well this shift. No use of call light. Pt impulsive, forgetful. Global aphasia. Using communication graphic. Will monitor closely for safety.        Problem: Stroke (IRF) (Adult)  Goal: Promote Optimal Functional [place]  Outcome: Ongoing (interventions implemented as appropriate)

## 2019-08-09 NOTE — THERAPY TREATMENT NOTE
"Inpatient Rehabilitation - Occupational Therapy Progress Note    [facility]     Patient Name: Darby Workman  : 1945  MRN: [id number]    Today's Date: 2019                 Admit Date: 2019      Visit Dx:  No diagnosis found.    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)   • Right-sided headache   • Acute ischemic stroke (CMS/HCC)   • Embolic stroke (CMS/HCC)   • Cerebral infarction due to stenosis of left carotid artery (CMS/HCC)   • Anticoagulated by anticoagulation treatment   • Stroke (cerebrum) (CMS/HCC)         Therapy Treatment    IRF Treatment Summary     Row Name 19 1536 19 1534 19 1300       Evaluation/Treatment Time and Intent    Subjective Information  --  no complaints  -AF  no complaints  -KB    Existing Precautions/Restrictions  fall  -LE  fall  -AF  fall swallow, communication  -KB    Document Type  therapy note (daily note)  -LE  therapy note (daily note)  -AF  therapy note (daily note)  -KB    Mode of Treatment  individual therapy;occupational therapy  -LE  occupational therapy  -AF  speech-language pathology  -KB    Patient/Family Observations  seated in w/c.  pt puts feet down, holds to wall to keep OT from moving w/c.  pushes away tabletop activity and says \"NO\"  -LE  seated in w/c behind NSG station  -AF  seated in wc; agreeable to therapy  -KB    Start Time (Evaluation/Treatment)  --  --  1300  -KB    Stop Time (Evaluation/Treatment)  --  --  1330  -KB    Recorded by [LE] Laura Harmon OTR [AF] Ingris Ansari OTR [KB] Bruton, Katherine L    Row Name 19 1044 19 0930          Evaluation/Treatment Time and Intent    Subjective Information  no complaints  -MD  no complaints  -KB     Existing Precautions/Restrictions  fall  -MD  fall swallow, communication  -KB     " Document Type  therapy note (daily note)  -MD  therapy note (daily note)  -KB     Mode of Treatment  physical therapy  -MD  speech-language pathology  -KB     Patient/Family Observations  Pt up in chair.  Daughter present through out session.  -MD  seated in wc in her room with daughter present  -KB     Start Time (Evaluation/Treatment)  --  0930  -KB     Stop Time (Evaluation/Treatment)  --  1000  -KB     Recorded by [MD] Mira Chadwick, PT [KB] Bruton, Katherine HILLARY     Row Name 08/09/19 1536 08/09/19 1534 08/09/19 1044       Cognition/Psychosocial- PT/OT    Affect/Mental Status (Cognitive)  unable/difficult to assess  -LE  unable/difficult to assess  -AF  --    Orientation Status (Cognition)  --  unable/difficult to assess  -AF  unable/difficult to assess  -MD    Follows Commands (Cognition)  follows one step commands;0-24% accuracy  -LE  follows one step commands;25-49% accuracy;delayed response/completion;increased processing time needed;physical/tactile prompts required;repetition of directions required;verbal cues/prompting required  -AF  follows one step commands;25-49% accuracy;physical/tactile prompts required;repetition of directions required;verbal cues/prompting required  -MD    Personal Safety Interventions  gait belt;fall prevention program maintained;nonskid shoes/slippers when out of bed exit alarm  -LE  fall prevention program maintained;gait belt;nonskid shoes/slippers when out of bed  -AF  fall prevention program maintained;gait belt  -MD    Attention Deficit (Cognitive)  --  severe deficit  -AF  --    Memory Deficit (Cognitive)  --  unable/difficult to assess  -AF  --    Safety Deficit (Cognitive)  --  severe deficit  -AF  --    Recorded by [LE] Laura Harmon OTR [AF] Ingris Ansari OTR [MD] Mira Chadwick, PT    Row Name 08/09/19 1536 08/09/19 1044          Bed Mobility Assessment/Treatment    Sit-Supine [place] (Bed Mobility)  --  supervision  -MD     Comment (Bed Mobility)  in w/c  -LE  --      Recorded by [LE] Laura Harmon OTR [MD] Mira Chadwick, PT     Row Name 08/09/19 1536             Functional Mobility    Functional Mobility- Ind. Level  -- 5 feet to sink from toilet at min A  -LE      Recorded by [LE] Laura Harmon OTR      Row Name 08/09/19 1534             Transfer Assessment/Treatment    Comment (Transfers)  sit to  shower with grab bars, CGA  -AF      Recorded by [AF] Ingris Ansari OTR      Row Name 08/09/19 1044             Chair-Bed Transfer    Chair-Bed [place] (Transfers)  contact guard  -MD      Recorded by [MD] Mira Chadwick, PT      Row Name 08/09/19 1536 08/09/19 1044          Sit-Stand Transfer    Sit-Stand [place] (Transfers)  contact guard  -LE  contact guard  -MD     Recorded by [LE] Laura Harmon OTR [MD] Mira Chadwick, PT     Row Name 08/09/19 1536 08/09/19 1044          Stand-Sit Transfer    Stand-Sit [place] (Transfers)  contact guard  -LE  contact guard  -MD     Recorded by [LE] Laura Harmon OTR [MD] Mira Chadwick, PT     Row Name 08/09/19 1536             Toilet Transfer    Type (Toilet Transfer)  stand pivot/stand step  -LE      [place] Level (Toilet Transfer)  contact guard  -LE      Assistive Device (Toilet Transfer)  grab bars/safety frame  -LE      Recorded by [LE] Laura Harmon OTR      Row Name 08/09/19 1534             Shower Transfer    Type (Shower Transfer)  stand pivot/stand step  -AF      [place] Level (Shower Transfer)  contact guard;verbal cues  -AF      Assistive Device (Shower Transfer)  wheelchair;grab bars/tub rail;tub bench  -AF      Recorded by [AF] Ingris Ansari OTR      Row Name 08/09/19 1044             Gait/Stairs Assessment/Training    [place] Level (Gait)  contact guard  -MD      Distance in Feet (Gait)  200  -MD      Pattern (Gait)  step-through  -MD      Deviations/Abnormal Patterns (Gait)  festinating/shuffling;eliza decreased  -MD      Bilateral Gait Deviations  forward flexed posture;heel strike decreased   -MD      [place] Level (Stairs)  verbal cues;contact guard  -MD      Handrail Location (Stairs)  both sides  -MD      Number of Steps (Stairs)  4  -MD      Ascending Technique (Stairs)  step-over-step  -MD      Descending Technique (Stairs)  step-to-step  -MD      Recorded by [MD] Mira Chadwick, PT      Row Name 08/09/19 1534             Bathing Assessment/Treatment    Bathing [place] Level  bathing skills;minimum assist (75% patient effort);verbal cues  -AF      Assistive Device (Bathing)  grab bar/tub rail;hand held shower spray hose;tub bench  -AF      Bathing Position  unsupported sitting;supported standing  -AF      Comment (Bathing)  vc's for throughness  -AF      Recorded by [AF] Ingris Ansari OTR      Row Name 08/09/19 1536 08/09/19 1534          Upper Body Dressing Assessment/Treatment    Upper Body Dressing Task  -- don sweater with min A  -LE  upper body dressing skills;set up assistance;verbal cues;minimum assist (75% or more patient effort)  -AF     Upper Body Dressing Position  --  supported sitting  -AF     Comment (Upper Body Dressing)  --  MIN A with fastening bra  -AF     Recorded by [LE] Laura Harmon, OTR [AF] Ingris Ansari OTR     Row Name 08/09/19 1534             Lower Body Dressing Assessment/Treatment    Lower Body Dressing [place] Level  pants/bottoms;don;shoes/slippers;minimum assist (75% patient effort);verbal cues  -AF      Lower Body Dressing Position  supported sitting;supported standing  -AF      Recorded by [AF] Ingris Ansari OTR      Row Name 08/09/19 1536 08/09/19 1534          Grooming Assessment/Treatment    Grooming [place] Level  oral care regimen;wash face, hands;hair care, combing/brushing;moderate assist (50% patient effort)  -LE  --     Grooming Position  sink side;supported sitting  -LE  --     Comment (Grooming)  pt puts comb in mouth, bites on toothette.  does comb hair with prompting/gestures, scrubs teeth w/ wet toothbrush.  washes face  with set up, gestures  -LE  assist to wash hair, pt licked her hand with shampoo even with hand over hand on top of her head  -AF     Recorded by [LE] Laura Harmon OTR [AF] Ingris Ansari, JOHN     Row Name 08/09/19 1536 08/09/19 1534          Toileting Assessment/Treatment    Toileting [place] Level  minimum assist (75% patient effort)  -LE  --     Assistive Device Use (Toileting)  grab bar/safety frame  -LE  --     Toileting Position  supported sitting;supported standing  -LE  --     Comment (Toileting)  improved attempt to grasp tab of jeans' zipper  -LE  assist with brief changes  -AF     Recorded by [LE] Laura Harmon OTR [AF] Ingris Ansari, JOHN     Row Name 08/09/19 1536 08/09/19 1534 08/09/19 1300       Pain Scale: Numbers Pre/Post-Treatment    Pain Scale: Numbers, Pretreatment  -- no grimace  -LE  0/10 - no pain  -AF  0/10 - no pain  -KB    Pain Scale: Numbers, Post-Treatment  --  0/10 - no pain  -AF  0/10 - no pain  -KB    Recorded by [LE] Laura Harmon OTR [AF] Ingris Ansari, OTKEVIN [KB] Bruton, Katherine L    Row Name 08/09/19 1044 08/09/19 0930          Pain Scale: Numbers Pre/Post-Treatment    Pain Scale: Numbers, Pretreatment  0/10 - no pain  -MD  0/10 - no pain  -KB     Pain Scale: Numbers, Post-Treatment  --  0/10 - no pain  -KB     Recorded by [MD] Mira Chadwick, PT [KB] Bruton, Katherine L     Row Name 08/09/19 1044             Standing Balance Activity    Activities Performed (Standing, Balance Training)  standing ball toss bean bag toss at mini basketball hoop  -MD      Support Needed for Balance (Standing, Balance Training)  minimal external support for balance, 75% patient effort  -MD      Recorded by [MD] Mira Chadwick, PT      Row Name 08/09/19 1044             Therapeutic Exercise    Therapeutic Exercise  supine, lower extremities  -MD      Recorded by [MD] Mira Chadwick, PT      Row Name 08/09/19 1044             Lower Extremity Supine Therapeutic Exercise    Performed, Supine Lower  Extremity (Therapeutic Exercise)  hip abduction/adduction;ankle dorsiflexion/plantarflexion;heel slides  -MD      Exercise Type, Supine Lower Extremity (Therapeutic Exercise)  AAROM (active assistive range of motion)  -MD      Sets/Reps Detail, Supine Lower Extremity (Therapeutic Exercise)  1/15  -MD      Recorded by [MD] Mira Chadwick, PT      Row Name 08/09/19 1536 08/09/19 1534 08/09/19 1044       Positioning and Restraints    Pre-Treatment Position  -- in w/c at nursing station  -LE  sitting in chair/recliner  -AF  sitting in chair/recliner  -MD    Post Treatment Position  --  wheelchair  -AF  bed  -MD    In Bed  --  --  supine;call light within reach;exit alarm on;notified nsg  -MD    In Wheelchair  call light within reach;sitting;exit alarm on at ns station  -LE  sitting;exit alarm on;patient within staff view at [facility] station  -AF  --    Recorded by [LE] Laura Harmon OTR [AF] Ingris Ansari OTR [MD] Mira Chadwick, PT      User Key  (r) = Recorded By, (t) = Taken By, (c) = Cosigned By    Initials Name Effective Dates    Laura Torres, OTR 06/08/18 -     Ingris Youssef OTR 04/03/18 -     Mira Vaca, PT 04/03/18 -     KB Bruton, Katherine L 03/07/18 -           Wound 07/17/19 1015 Left neck incision (Active)   Dressing Appearance open to air 8/9/2019  8:00 AM   Closure Liquid skin adhesive 8/9/2019  8:00 AM   Base dry;clean 8/9/2019  8:00 AM   Drainage Amount none 8/8/2019  8:00 PM   Dressing Care, Wound open to air 8/9/2019  8:00 AM         OT Recommendation and Plan                 OT IRF GOALS     Row Name 08/01/19 1159             Transfer Goal 1 (OT-IRF)    Activity/Assistive Device (Transfer Goal 1, OT-IRF)  toilet;shower chair;walk-in shower  -AF      [place] Level (Transfer Goal 1, OT-IRF)  verbal cues required;minimum assist (75% or more patient effort);contact guard assist  -AF      Time Frame (Transfer Goal 1, OT-IRF)  short term goal (STG)  -AF      Progress/Outcomes (Transfer Goal 1,  OT-IRF)  goal ongoing  -AF         Transfer Goal 2 (OT-IRF)    Activity/Assistive Device (Transfer Goal 2, OT-IRF)  shower chair;walk-in shower;toilet  -AF      [place] Level (Transfer Goal 2, OT-IRF)  contact guard assist;verbal cues required  -AF      Time Frame (Transfer Goal 2, OT-IRF)  long term goal (LTG)  -AF      Progress/Outcomes (Transfer Goal 2, OT-IRF)  goal ongoing  -AF         Bathing Goal 1 (OT-IRF)    Activity/Device (Bathing Goal 1, OT-IRF)  bathing skills, all;grab bar/tub rail;hand-held shower spray hose;shower chair  -AF      [place] Level (Bathing Goal 1, OT-IRF)  minimum assist (75% or more patient effort);verbal cues required  -AF      Time Frame (Bathing Goal 1, OT-IRF)  short term goal (STG)  -AF      Progress/Outcomes (Bathing Goal 1, OT-IRF)  goal ongoing  -AF         Bathing Goal 2 (OT-IRF)    Activity/Device (Bathing Goal 2, OT-IRF)  bathing skills, all;grab bar/tub rail;hand-held shower spray hose;shower chair  -AF      [place] Level (Bathing Goal 2, OT-IRF)  contact guard assist;verbal cues required  -AF      Time Frame (Bathing Goal 2, OT-IRF)  long term goal (LTG)  -AF      Progress/Outcomes (Bathing Goal 2, OT-IRF)  goal ongoing  -AF         UB Dressing Goal 1 (OT-IRF)    Activity/Device (UB Dressing Goal 1, OT-IRF)  upper body dressing  -AF      [place] (UB Dress Goal 1, OT-IRF)  set-up required  -AF      Time Frame (UB Dressing Goal 1, OT-IRF)  long term goal (LTG)  -AF      Progress/Outcomes (UB Dressing Goal 1, OT-IRF)  goal ongoing  -AF         LB Dressing Goal 1 (OT-IRF)    Activity/Device (LB Dressing Goal 1, OT-IRF)  lower body dressing  -AF      [place] (LB Dressing Goal 1, OT-IRF)  moderate assist (50-74% patient effort);verbal cues required  -AF      Time Frame (LB Dressing Goal 1, OT-IRF)  short term goal (STG)  -AF      Progress/Outcomes (LB Dressing Goal 1, OT-IRF)  goal ongoing  -AF         LB Dressing Goal 2 (OT-IRF)    Activity/Device (LB  Dressing Goal 2, OT-IRF)  lower body dressing  -AF      [place] (LB Dressing Goal 2, OT-IRF)  contact guard assist;verbal cues required  -AF      Time Frame (LB Dressing Goal 2, OT-IRF)  long term goal (LTG)  -AF      Progress/Outcomes (LB Dressing Goal 2, OT-IRF)  goal ongoing  -AF         Grooming Goal 1 (OT-IRF)    Activity/Device (Grooming Goal 1, OT-IRF)  grooming skills, all  -AF      [place] (Grooming Goal 1, OT-IRF)  minimum assist (75% or more patient effort);verbal cues required  -AF      Time Frame (Grooming Goal 1, OT-IRF)  short term goal (STG)  -AF      Progress/Outcomes (Grooming Goal 1, OT-IRF)  goal ongoing  -AF         Grooming Goal 2 (OT-IRF)    Activity/Device (Grooming Goal 2, OT-IRF)  grooming skills, all  -AF      [place] (Grooming Goal 2, OT-IRF)  supervision required;verbal cues required  -AF      Time Frame (Grooming Goal 2, OT-IRF)  long term goal (LTG)  -AF      Progress/Outcomes (Grooming Goal 2, OT-IRF)  goal ongoing  -AF         Toileting Goal 1 (OT-IRF)    Activity/Device (Toileting Goal 1, OT-IRF)  toileting skills, all;grab bar/safety frame;raised toilet seat  -AF      [place] Level (Toileting Goal 1, OT-IRF)  moderate assist (50-74% patient effort);verbal cues required  -AF      Time Frame (Toileting Goal 1, OT-IRF)  short term goal (STG)  -AF      Progress/Outcomes (Toileting Goal 1, OT-IRF)  goal ongoing  -AF         Toileting Goal 2 (OT-IRF)    Activity/Device (Toileting Goal 2, OT-IRF)  toileting skills, all;grab bar/safety frame;raised toilet seat  -AF      [place] Level (Toileting Goal 2, OT-IRF)  verbal cues required;contact guard assist  -AF      Time Frame (Toileting Goal 2, OT-IRF)  long term goal (LTG)  -AF      Progress/Outcomes (Toileting Goal 2, OT-IRF)  goal ongoing  -AF         Caregiver Training Goal 1 (OT-IRF)    Caregiver Training Goal 1 (OT-IRF)  Family and patient to demo safe technique with ADLs, transfers, HEP and adaptive equipment  as needed   -AF      Time Frame (Caregiver Training Goal 1, OT-IRF)  long term goal (LTG)  -AF      Progress/Outcomes (Caregiver Training Goal 1, OT-IRF)  goal ongoing  -AF        User Key  (r) = Recorded By, (t) = Taken By, (c) = Cosigned By    Initials Name Provider Type    Ingris Youssef OTKEVIN Occupational Therapist                     Time Calculation:     Time Calculation- OT     Row Name 08/09/19 1541             Time Calculation- OT    OT Start Time  1230  -LE      OT Stop Time  1300  -LE      OT Time Calculation (min)  30 min  -LE      Total Timed Code Minutes- OT  30 minute(s)  -LE        User Key  (r) = Recorded By, (t) = Taken By, (c) = Cosigned By    Initials Name Provider Type    Laura Torres OTR Occupational Therapist          Therapy Charges for Today     Code Description Service Date Service Provider Modifiers Qty    93452192609 HC OT SELF CARE/MGMT/TRAIN EA 15 MIN 8/8/2019 Laura Harmon OTR GO 2    00562678857 HC OT THERAPEUTIC ACT EA 15 MIN 8/8/2019 Laura Harmon OTR GO 2    78659830046 HC OT SELF CARE/MGMT/TRAIN EA 15 MIN 8/9/2019 Laura Harmon OTR GO 2                   JOHN Green  8/9/2019

## 2019-08-09 NOTE — THERAPY TREATMENT NOTE
"Inpatient Rehabilitation - Occupational Therapy Treatment Note    [facility]     Patient Name: Darby Workman  : 1945  MRN: [id number]    Today's Date: 2019                 Admit Date: 2019      Visit Dx:  No diagnosis found.    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)   • Right-sided headache   • Acute ischemic stroke (CMS/HCC)   • Embolic stroke (CMS/HCC)   • Cerebral infarction due to stenosis of left carotid artery (CMS/HCC)   • Anticoagulated by anticoagulation treatment   • Stroke (cerebrum) (CMS/HCC)         Therapy Treatment    IRF Treatment Summary     Row Name 19 1536 19 1534 19 1300       Evaluation/Treatment Time and Intent    Subjective Information  --  no complaints  -AF  no complaints  -KB    Existing Precautions/Restrictions  fall  -LE  fall  -AF  fall swallow, communication  -KB    Document Type  therapy note (daily note)  -LE  therapy note (daily note)  -AF  therapy note (daily note)  -KB    Mode of Treatment  individual therapy;occupational therapy  -LE  occupational therapy  -AF  speech-language pathology  -KB    Patient/Family Observations  seated in w/c.  pt puts feet down, holds to wall to keep OT from moving w/c.  pushes away tabletop activity and says \"NO\"  -LE  seated in w/c behind NSG station  -AF  seated in wc; agreeable to therapy  -KB    Start Time (Evaluation/Treatment)  --  --  1300  -KB    Stop Time (Evaluation/Treatment)  --  --  1330  -KB    Recorded by [LE] Laura Harmon OTR [AF] Ingris Ansari OTR [KB] Bruton, Katherine L    Row Name 19 1044 19 0930          Evaluation/Treatment Time and Intent    Subjective Information  no complaints  -MD  no complaints  -KB     Existing Precautions/Restrictions  fall  -MD  fall swallow, communication  -KB     " Document Type  therapy note (daily note)  -MD  therapy note (daily note)  -KB     Mode of Treatment  physical therapy  -MD  speech-language pathology  -KB     Patient/Family Observations  Pt up in chair.  Daughter present through out session.  -MD  seated in wc in her room with daughter present  -KB     Start Time (Evaluation/Treatment)  --  0930  -KB     Stop Time (Evaluation/Treatment)  --  1000  -KB     Recorded by [MD] Mira Chadwick, PT [KB] Bruton, Katherine HILLARY     Row Name 08/09/19 1536 08/09/19 1534 08/09/19 1044       Cognition/Psychosocial- PT/OT    Affect/Mental Status (Cognitive)  unable/difficult to assess  -LE  unable/difficult to assess  -AF  --    Orientation Status (Cognition)  --  unable/difficult to assess  -AF  unable/difficult to assess  -MD    Follows Commands (Cognition)  follows one step commands;0-24% accuracy  -LE  follows one step commands;25-49% accuracy;delayed response/completion;increased processing time needed;physical/tactile prompts required;repetition of directions required;verbal cues/prompting required  -AF  follows one step commands;25-49% accuracy;physical/tactile prompts required;repetition of directions required;verbal cues/prompting required  -MD    Personal Safety Interventions  gait belt;fall prevention program maintained;nonskid shoes/slippers when out of bed exit alarm  -LE  fall prevention program maintained;gait belt;nonskid shoes/slippers when out of bed  -AF  fall prevention program maintained;gait belt  -MD    Attention Deficit (Cognitive)  --  severe deficit  -AF  --    Memory Deficit (Cognitive)  --  unable/difficult to assess  -AF  --    Safety Deficit (Cognitive)  --  severe deficit  -AF  --    Recorded by [LE] Laura Harmon OTR [AF] Ingris Ansari OTR [MD] Mira Chadwick, PT    Row Name 08/09/19 1536 08/09/19 1044          Bed Mobility Assessment/Treatment    Sit-Supine [place] (Bed Mobility)  --  supervision  -MD     Comment (Bed Mobility)  in w/c  -LE  --      Recorded by [LE] Laura Harmon OTR [MD] Mira Chadwick, PT     Row Name 08/09/19 1536             Functional Mobility    Functional Mobility- Ind. Level  -- 5 feet to sink from toilet at min A  -LE      Recorded by [LE] Laura Harmon OTR      Row Name 08/09/19 1534             Transfer Assessment/Treatment    Comment (Transfers)  sit to  shower with grab bars, CGA  -AF      Recorded by [AF] Ingris Ansari OTR      Row Name 08/09/19 1044             Chair-Bed Transfer    Chair-Bed [place] (Transfers)  contact guard  -MD      Recorded by [MD] Mira Chadwick, PT      Row Name 08/09/19 1536 08/09/19 1044          Sit-Stand Transfer    Sit-Stand [place] (Transfers)  contact guard  -LE  contact guard  -MD     Recorded by [LE] Laura Harmon OTR [MD] Mira Chadwikc, PT     Row Name 08/09/19 1536 08/09/19 1044          Stand-Sit Transfer    Stand-Sit [place] (Transfers)  contact guard  -LE  contact guard  -MD     Recorded by [LE] Laura Harmon OTR [MD] Mira Chadwick, PT     Row Name 08/09/19 1536             Toilet Transfer    Type (Toilet Transfer)  stand pivot/stand step  -LE      [place] Level (Toilet Transfer)  contact guard  -LE      Assistive Device (Toilet Transfer)  grab bars/safety frame  -LE      Recorded by [LE] Laura Harmon OTR      Row Name 08/09/19 1534             Shower Transfer    Type (Shower Transfer)  stand pivot/stand step  -AF      [place] Level (Shower Transfer)  contact guard;verbal cues  -AF      Assistive Device (Shower Transfer)  wheelchair;grab bars/tub rail;tub bench  -AF      Recorded by [AF] Ingris Ansari OTR      Row Name 08/09/19 1044             Gait/Stairs Assessment/Training    [place] Level (Gait)  contact guard  -MD      Distance in Feet (Gait)  200  -MD      Pattern (Gait)  step-through  -MD      Deviations/Abnormal Patterns (Gait)  festinating/shuffling;eliza decreased  -MD      Bilateral Gait Deviations  forward flexed posture;heel strike decreased   -MD      [place] Level (Stairs)  verbal cues;contact guard  -MD      Handrail Location (Stairs)  both sides  -MD      Number of Steps (Stairs)  4  -MD      Ascending Technique (Stairs)  step-over-step  -MD      Descending Technique (Stairs)  step-to-step  -MD      Recorded by [MD] Mira Chadwick, PT      Row Name 08/09/19 1534             Bathing Assessment/Treatment    Bathing [place] Level  bathing skills;minimum assist (75% patient effort);verbal cues  -AF      Assistive Device (Bathing)  grab bar/tub rail;hand held shower spray hose;tub bench  -AF      Bathing Position  unsupported sitting;supported standing  -AF      Comment (Bathing)  vc's for throughness  -AF      Recorded by [AF] Ingris Ansari OTR      Row Name 08/09/19 1536 08/09/19 1534          Upper Body Dressing Assessment/Treatment    Upper Body Dressing Task  -- don sweater with min A  -LE  upper body dressing skills;set up assistance;verbal cues;minimum assist (75% or more patient effort)  -AF     Upper Body Dressing Position  --  supported sitting  -AF     Comment (Upper Body Dressing)  --  MIN A with fastening bra  -AF     Recorded by [LE] Laura Harmon, OTR [AF] Ingris Ansari OTR     Row Name 08/09/19 1534             Lower Body Dressing Assessment/Treatment    Lower Body Dressing [place] Level  pants/bottoms;don;shoes/slippers;minimum assist (75% patient effort);verbal cues  -AF      Lower Body Dressing Position  supported sitting;supported standing  -AF      Recorded by [AF] Ingris Ansari OTR      Row Name 08/09/19 1536 08/09/19 1534          Grooming Assessment/Treatment    Grooming [place] Level  oral care regimen;wash face, hands;hair care, combing/brushing;moderate assist (50% patient effort)  -LE  --     Grooming Position  sink side;supported sitting  -LE  --     Comment (Grooming)  pt puts comb in mouth, bites on toothette.  does comb hair with prompting/gestures, scrubs teeth w/ wet toothbrush.  washes face  with set up, gestures  -LE  assist to wash hair, pt licked her hand with shampoo even with hand over hand on top of her head  -AF     Recorded by [LE] Laura Harmon OTR [AF] Ingris Ansari, JOHN     Row Name 08/09/19 1536 08/09/19 1534          Toileting Assessment/Treatment    Toileting [place] Level  minimum assist (75% patient effort)  -LE  --     Assistive Device Use (Toileting)  grab bar/safety frame  -LE  --     Toileting Position  supported sitting;supported standing  -LE  --     Comment (Toileting)  improved attempt to grasp tab of jeans' zipper  -LE  assist with brief changes  -AF     Recorded by [LE] Laura Harmon OTR [AF] Ingris Ansari, JOHN     Row Name 08/09/19 1536 08/09/19 1534 08/09/19 1300       Pain Scale: Numbers Pre/Post-Treatment    Pain Scale: Numbers, Pretreatment  -- no grimace  -LE  0/10 - no pain  -AF  0/10 - no pain  -KB    Pain Scale: Numbers, Post-Treatment  --  0/10 - no pain  -AF  0/10 - no pain  -KB    Recorded by [LE] Laura Harmon OTR [AF] Ingris Ansrai, OTKEVIN [KB] Bruton, Katherine L    Row Name 08/09/19 1044 08/09/19 0930          Pain Scale: Numbers Pre/Post-Treatment    Pain Scale: Numbers, Pretreatment  0/10 - no pain  -MD  0/10 - no pain  -KB     Pain Scale: Numbers, Post-Treatment  --  0/10 - no pain  -KB     Recorded by [MD] Mira Chadwick, PT [KB] Bruton, Katherine L     Row Name 08/09/19 1044             Standing Balance Activity    Activities Performed (Standing, Balance Training)  standing ball toss bean bag toss at mini basketball hoop  -MD      Support Needed for Balance (Standing, Balance Training)  minimal external support for balance, 75% patient effort  -MD      Recorded by [MD] Mira Chadwick, PT      Row Name 08/09/19 1044             Therapeutic Exercise    Therapeutic Exercise  supine, lower extremities  -MD      Recorded by [MD] Mira Chadwick, PT      Row Name 08/09/19 1044             Lower Extremity Supine Therapeutic Exercise    Performed, Supine Lower  Extremity (Therapeutic Exercise)  hip abduction/adduction;ankle dorsiflexion/plantarflexion;heel slides  -MD      Exercise Type, Supine Lower Extremity (Therapeutic Exercise)  AAROM (active assistive range of motion)  -MD      Sets/Reps Detail, Supine Lower Extremity (Therapeutic Exercise)  1/15  -MD      Recorded by [MD] Mira Chadwick, PT      Row Name 08/09/19 1536 08/09/19 1534 08/09/19 1044       Positioning and Restraints    Pre-Treatment Position  -- in w/c at nursing station  -LE  sitting in chair/recliner  -AF  sitting in chair/recliner  -MD    Post Treatment Position  --  wheelchair  -AF  bed  -MD    In Bed  --  --  supine;call light within reach;exit alarm on;notified nsg  -MD    In Wheelchair  call light within reach;sitting;exit alarm on at ns station  -LE  sitting;exit alarm on;patient within staff view at [facility] [place]  -AF  --    Recorded by [LE] Laura Harmon OTR [AF] Ingris Ansari OTR [MD] Mira Chadwick, PT      User Key  (r) = Recorded By, (t) = Taken By, (c) = Cosigned By    Initials Name Effective Dates    Laura Torres, OTR 06/08/18 -     Ingris Youssef OTR 04/03/18 -     Mira Vaca, PT 04/03/18 -     KB Bruton, Katherine L 03/07/18 -           Wound 07/17/19 1015 Left neck incision (Active)   Dressing Appearance open to air 8/9/2019  8:00 AM   Closure Liquid skin adhesive 8/9/2019  8:00 AM   Base dry;clean 8/9/2019  8:00 AM   Drainage Amount none 8/8/2019  8:00 PM   Dressing Care, Wound open to air 8/9/2019  8:00 AM         OT Recommendation and Plan    Anticipated Equipment Needs At Discharge (OT Eval): bathing equipment  Planned Therapy Interventions (OT Eval): activity tolerance training, BADL retraining, cognitive/visual perception retraining, functional balance retraining, neuromuscular control/coordination retraining, occupation/activity based interventions, patient/caregiver education/training, ROM/therapeutic exercise, strengthening exercise, transfer/mobility retraining             OT IRF GOALS     Row Name 08/01/19 1159             Transfer Goal 1 (OT-IRF)    Activity/Assistive Device (Transfer Goal 1, OT-IRF)  toilet;shower chair;walk-in shower  -AF      [place] Level (Transfer Goal 1, OT-IRF)  verbal cues required;minimum assist (75% or more patient effort);contact guard assist  -AF      Time Frame (Transfer Goal 1, OT-IRF)  short term goal (STG)  -AF      Progress/Outcomes (Transfer Goal 1, OT-IRF)  goal ongoing  -AF         Transfer Goal 2 (OT-IRF)    Activity/Assistive Device (Transfer Goal 2, OT-IRF)  shower chair;walk-in shower;toilet  -AF      [place] Level (Transfer Goal 2, OT-IRF)  contact guard assist;verbal cues required  -AF      Time Frame (Transfer Goal 2, OT-IRF)  long term goal (LTG)  -AF      Progress/Outcomes (Transfer Goal 2, OT-IRF)  goal ongoing  -AF         Bathing Goal 1 (OT-IRF)    Activity/Device (Bathing Goal 1, OT-IRF)  bathing skills, all;grab bar/tub rail;hand-held shower spray hose;shower chair  -AF      [place] Level (Bathing Goal 1, OT-IRF)  minimum assist (75% or more patient effort);verbal cues required  -AF      Time Frame (Bathing Goal 1, OT-IRF)  short term goal (STG)  -AF      Progress/Outcomes (Bathing Goal 1, OT-IRF)  goal ongoing  -AF         Bathing Goal 2 (OT-IRF)    Activity/Device (Bathing Goal 2, OT-IRF)  bathing skills, all;grab bar/tub rail;hand-held shower spray hose;shower chair  -AF      [place] Level (Bathing Goal 2, OT-IRF)  contact guard assist;verbal cues required  -AF      Time Frame (Bathing Goal 2, OT-IRF)  long term goal (LTG)  -AF      Progress/Outcomes (Bathing Goal 2, OT-IRF)  goal ongoing  -AF         UB Dressing Goal 1 (OT-IRF)    Activity/Device (UB Dressing Goal 1, OT-IRF)  upper body dressing  -AF      [place] (UB Dress Goal 1, OT-IRF)  set-up required  -AF      Time Frame (UB Dressing Goal 1, OT-IRF)  long term goal (LTG)  -AF      Progress/Outcomes (UB Dressing Goal 1, OT-IRF)  goal ongoing   -AF         LB Dressing Goal 1 (OT-IRF)    Activity/Device (LB Dressing Goal 1, OT-IRF)  lower body dressing  -AF      [place] (LB Dressing Goal 1, OT-IRF)  moderate assist (50-74% patient effort);verbal cues required  -AF      Time Frame (LB Dressing Goal 1, OT-IRF)  short term goal (STG)  -AF      Progress/Outcomes (LB Dressing Goal 1, OT-IRF)  goal ongoing  -AF         LB Dressing Goal 2 (OT-IRF)    Activity/Device (LB Dressing Goal 2, OT-IRF)  lower body dressing  -AF      [place] (LB Dressing Goal 2, OT-IRF)  contact guard assist;verbal cues required  -AF      Time Frame (LB Dressing Goal 2, OT-IRF)  long term goal (LTG)  -AF      Progress/Outcomes (LB Dressing Goal 2, OT-IRF)  goal ongoing  -AF         Grooming Goal 1 (OT-IRF)    Activity/Device (Grooming Goal 1, OT-IRF)  grooming skills, all  -AF      [place] (Grooming Goal 1, OT-IRF)  minimum assist (75% or more patient effort);verbal cues required  -AF      Time Frame (Grooming Goal 1, OT-IRF)  short term goal (STG)  -AF      Progress/Outcomes (Grooming Goal 1, OT-IRF)  goal ongoing  -AF         Grooming Goal 2 (OT-IRF)    Activity/Device (Grooming Goal 2, OT-IRF)  grooming skills, all  -AF      [place] (Grooming Goal 2, OT-IRF)  supervision required;verbal cues required  -AF      Time Frame (Grooming Goal 2, OT-IRF)  long term goal (LTG)  -AF      Progress/Outcomes (Grooming Goal 2, OT-IRF)  goal ongoing  -AF         Toileting Goal 1 (OT-IRF)    Activity/Device (Toileting Goal 1, OT-IRF)  toileting skills, all;grab bar/safety frame;raised toilet seat  -AF      [place] Level (Toileting Goal 1, OT-IRF)  moderate assist (50-74% patient effort);verbal cues required  -AF      Time Frame (Toileting Goal 1, OT-IRF)  short term goal (STG)  -AF      Progress/Outcomes (Toileting Goal 1, OT-IRF)  goal ongoing  -AF         Toileting Goal 2 (OT-IRF)    Activity/Device (Toileting Goal 2, OT-IRF)  toileting skills, all;grab bar/safety  frame;raised toilet seat  -AF      [place] Level (Toileting Goal 2, OT-IRF)  verbal cues required;contact guard assist  -AF      Time Frame (Toileting Goal 2, OT-IRF)  long term goal (LTG)  -AF      Progress/Outcomes (Toileting Goal 2, OT-IRF)  goal ongoing  -AF         Caregiver Training Goal 1 (OT-IRF)    Caregiver Training Goal 1 (OT-IRF)  Family and patient to demo safe technique with ADLs, transfers, HEP and adaptive equipment as needed   -AF      Time Frame (Caregiver Training Goal 1, OT-IRF)  long term goal (LTG)  -AF      Progress/Outcomes (Caregiver Training Goal 1, OT-IRF)  goal ongoing  -AF        User Key  (r) = Recorded By, (t) = Taken By, (c) = Cosigned By    Initials Name Provider Type    Ingris Youssef OTR Occupational Therapist                     Time Calculation:     Time Calculation- OT     Row Name 08/09/19 1542 08/09/19 1541          Time Calculation- OT    OT Start Time  0830  -AF  1230  -LE     OT Stop Time  0900  -AF  1300  -LE     OT Time Calculation (min)  30 min  -AF  30 min  -LE     Total Timed Code Minutes- OT  --  30 minute(s)  -LE       User Key  (r) = Recorded By, (t) = Taken By, (c) = Cosigned By    Initials Name Provider Type    Laura Torres OTR Occupational Therapist    Ingris Youssef OTR Occupational Therapist          Therapy Charges for Today     Code Description Service Date Service Provider Modifiers Qty    61468525502 HC OT SELF CARE/MGMT/TRAIN EA 15 MIN 8/9/2019 Ingris Ansari OTR GO 2                   JOHN Mejia  8/9/2019

## 2019-08-09 NOTE — PROGRESS NOTES
Phone message left for pt's  regarding need to discuss treatment goals and to schedule family conference.    Addendum:  Treatment goals and length of stay discussed with pt's . Family conference scheduled for Thursday, 8/15.

## 2019-08-10 LAB
GLUCOSE BLDC GLUCOMTR-MCNC: 114 MG/DL (ref 70–130)
GLUCOSE BLDC GLUCOMTR-MCNC: 150 MG/DL (ref 70–130)
GLUCOSE BLDC GLUCOMTR-MCNC: 178 MG/DL (ref 70–130)
GLUCOSE BLDC GLUCOMTR-MCNC: 207 MG/DL (ref 70–130)

## 2019-08-10 PROCEDURE — 97110 THERAPEUTIC EXERCISES: CPT

## 2019-08-10 PROCEDURE — 82962 GLUCOSE BLOOD TEST: CPT

## 2019-08-10 PROCEDURE — 63710000001 INSULIN REGULAR HUMAN PER 5 UNITS: Performed by: PHYSICAL MEDICINE & REHABILITATION

## 2019-08-10 RX ORDER — HYDRALAZINE HYDROCHLORIDE 10 MG/1
20 TABLET, FILM COATED ORAL EVERY 8 HOURS SCHEDULED
Status: DISCONTINUED | OUTPATIENT
Start: 2019-08-10 | End: 2019-08-11

## 2019-08-10 RX ADMIN — NEBIVOLOL HYDROCHLORIDE 20 MG: 10 TABLET ORAL at 08:11

## 2019-08-10 RX ADMIN — LANSOPRAZOLE 30 MG: KIT at 18:13

## 2019-08-10 RX ADMIN — APIXABAN 5 MG: 5 TABLET, FILM COATED ORAL at 21:34

## 2019-08-10 RX ADMIN — METFORMIN HYDROCHLORIDE 1000 MG: 1000 TABLET ORAL at 08:10

## 2019-08-10 RX ADMIN — NYSTATIN 500000 UNITS: 100000 SUSPENSION ORAL at 11:43

## 2019-08-10 RX ADMIN — DORZOLAMIDE HYDROCHLORIDE 1 DROP: 20 SOLUTION/ DROPS OPHTHALMIC at 08:10

## 2019-08-10 RX ADMIN — NYSTATIN 500000 UNITS: 100000 SUSPENSION ORAL at 21:33

## 2019-08-10 RX ADMIN — APIXABAN 5 MG: 5 TABLET, FILM COATED ORAL at 08:10

## 2019-08-10 RX ADMIN — LANSOPRAZOLE 30 MG: KIT at 06:00

## 2019-08-10 RX ADMIN — GLIPIZIDE 5 MG: 5 TABLET ORAL at 18:16

## 2019-08-10 RX ADMIN — LOSARTAN POTASSIUM: 50 TABLET, FILM COATED ORAL at 08:11

## 2019-08-10 RX ADMIN — HYDRALAZINE HYDROCHLORIDE 10 MG: 10 TABLET, FILM COATED ORAL at 14:18

## 2019-08-10 RX ADMIN — GLIPIZIDE 5 MG: 5 TABLET ORAL at 07:15

## 2019-08-10 RX ADMIN — NYSTATIN 500000 UNITS: 100000 SUSPENSION ORAL at 18:13

## 2019-08-10 RX ADMIN — NYSTATIN 500000 UNITS: 100000 SUSPENSION ORAL at 08:10

## 2019-08-10 RX ADMIN — BRIMONIDINE TARTRATE 1 DROP: 2 SOLUTION OPHTHALMIC at 08:10

## 2019-08-10 RX ADMIN — METFORMIN HYDROCHLORIDE 1000 MG: 1000 TABLET ORAL at 18:13

## 2019-08-10 RX ADMIN — BRIMONIDINE TARTRATE 1 DROP: 2 SOLUTION OPHTHALMIC at 21:34

## 2019-08-10 RX ADMIN — DORZOLAMIDE HYDROCHLORIDE 1 DROP: 20 SOLUTION/ DROPS OPHTHALMIC at 21:34

## 2019-08-10 RX ADMIN — NEBIVOLOL HYDROCHLORIDE 20 MG: 10 TABLET ORAL at 21:34

## 2019-08-10 RX ADMIN — AMANTADINE HYDROCHLORIDE 100 MG: 100 CAPSULE ORAL at 05:59

## 2019-08-10 RX ADMIN — HYDRALAZINE HYDROCHLORIDE 10 MG: 10 TABLET, FILM COATED ORAL at 05:59

## 2019-08-10 RX ADMIN — HYDRALAZINE HYDROCHLORIDE 20 MG: 10 TABLET, FILM COATED ORAL at 21:34

## 2019-08-10 RX ADMIN — INSULIN HUMAN 3 UNITS: 100 INJECTION, SOLUTION PARENTERAL at 08:08

## 2019-08-10 RX ADMIN — ATORVASTATIN CALCIUM 80 MG: 80 TABLET, FILM COATED ORAL at 21:34

## 2019-08-10 RX ADMIN — ASPIRIN 325 MG: 325 TABLET ORAL at 08:11

## 2019-08-10 NOTE — PROGRESS NOTES
Inpatient Rehabilitation Functional Measures Assessment    Functional Measures  KATI Eating:  [facility] Grooming: [facility] Bathing:  [facility] Upper Body Dressing:  [facility] Lower Body Dressing:  [facility] Toileting:  [facility] Bladder Management  Level of Assistance:  [place]  Frequency/Number of Accidents this Shift:  [facility] Bowel Management  Level of Assistance: [place]  Frequency/Number of Accidents this Shift: [facility] Bed/Chair/Wheelchair Transfer:  [facility] Toilet Transfer:  [facility] Tub/Shower Transfer:  [place]    Previously Documented Mode of Locomotion at Discharge: Field  KATI Expected Mode of Locomotion at Discharge: [facility] Walk/Wheelchair:  [facility] Stairs:  [facility] Comprehension:  Both ( auditory and visual) modes of comprehension are used  equally. Comprehension Score = 6, Modified [place].  Patient comprehends  complex/abstract information in their primary language with only mild  difficulty. forgetful and confused at times. Expressive global aphasia and  dysarthria.  KATI Expression:  Vocal expression is the usual mode. Expression Score = 6,  Modified Independent.  Patient expresses complex/abstract information in their  primary language with only mild difficulty with tasks. forgetful and confused at  times. Expressive global aphasia and dysarthria.  KATI Social Interaction:  Social Interaction Score = 6, Modified Independent.  Patient is modified independent for social interaction, occasionally losing  control, but self-corrects. forgetful and confused at times. Expressive global  aphasia and dysarthria.  KATI Problem Solving:  Problem Solving Score = 6, Modified [place].  Patient  makes appropriate decisions in order to solve complex problems with mild  difficulty but self-corrects. forgetful and confused at times. Expressive global  aphasia and dysarthria.  KATI Memory:  Memory Score = 6, Modified [place].  Patient is modified  independent  for memory, requiring: Requires additional time. forgetful and  confused times    Therapy Mode Minutes  Occupational Therapy: [place]  Physical Therapy: [place]  Speech Language Pathology:  [place]    Signed by: Dimitry Baldwin RN

## 2019-08-10 NOTE — PROGRESS NOTES
Inpatient Rehabilitation Functional Measures Assessment    Functional Measures  KATI Eating:  [facility] Grooming: [facility] Bathing:  [facility] Upper Body Dressing:  [facility] Lower Body Dressing:  [facility] Toileting:  [facility] Bladder Management  Level of Assistance:  [place]  Frequency/Number of Accidents this Shift:  [facility] Bowel Management  Level of Assistance: [place]  Frequency/Number of Accidents this Shift: [facility] Bed/Chair/Wheelchair Transfer:  [facility] Toilet Transfer:  [facility] Tub/Shower Transfer:  [place]    Previously Documented Mode of Locomotion at Discharge: Field  KATI Expected Mode of Locomotion at Discharge: [facility] Walk/Wheelchair:  [facility] Stairs:  [facility] Comprehension:  Auditory comprehension is the usual mode. Patient does not  comprehend complex/abstract information in their primary language without  assistance from a helper. Comprehension Score = 2, Maximal Prompting. Patient  comprehends basic daily needs 25-49% of the time. Patient understands simple  information via single words or gestures. Requires maximal/a lot of prompting  (most of the time). Patient requires the following assistive device(s): Glasses.    KATI Expression:  Vocal expression is the usual mode. Patient does not express  complex/abstract information in their primary language without a helper.  Expression Score = 2, Maximal Prompting. Patient expresses basic daily needs  25-49% of the time. Patient uses only single words or gestures and requires  maximal/a lot of prompting (most of the time). No assistive devices were  required.  KATI Social Interaction:  Activity was not observed.  KATI Problem Solving:  Activity was not observed.  KATI Memory:  Activity was not observed.    Therapy Mode Minutes  Occupational Therapy: [place]  Physical Therapy: [place]  Speech Language Pathology:  [place]    Signed by: Leslie Mcdonald RN

## 2019-08-10 NOTE — PROGRESS NOTES
Inpatient Rehabilitation Functional Measures Assessment    Functional Measures  KATI Eating:  [facility] Grooming: [facility] Bathing:  [facility] Upper Body Dressing:  [facility] Lower Body Dressing:  [facility] Toileting:  [facility] Bladder Management  Level of Assistance:  [place]  Frequency/Number of Accidents this Shift:  [facility] Bowel Management  Level of Assistance: [place]  Frequency/Number of Accidents this Shift: [facility] Bed/Chair/Wheelchair Transfer:  Activity was not observed.  KATI Toilet Transfer:  [facility] Tub/Shower Transfer:  [place]    Previously Documented Mode of Locomotion at Discharge: Field  KATI Expected Mode of Locomotion at Discharge: [facility] Walk/Wheelchair:  WHEELCHAIR OBSERVATION   Activity was not observed.    WALK OBSERVATION   Walk Distance Scale = 3.  Distance walked is greater than 150 feet. Walk Score  = 4.  Patient performs 75% or more of effort and requires minimal assistance.  Incidental help/contact guard/steadying was provided. Patient walked a distance  of  200 feet. No assistive devices were required.  KATI Stairs:  Stairs Score = 2.  Incidental assistance with lifting or lowering,  contact guard or steadying was provided. Patient performs 75% or more of effort  and requires minimal contact assistance. Patient negotiated  4 stairs. Patient  requires the following assistive device(s): Handrail(s).    KATI Comprehension:  [place]  KTAI Expression:  [facility] Social Interaction:  [facility] Problem Solving:  [facility] Memory:  [place]    Therapy Mode Minutes  Occupational Therapy: [place]  Physical Therapy: Individual: 30 minutes.  Speech Language Pathology:  [place]    Signed by: Mira Chadwick, PT

## 2019-08-10 NOTE — PROGRESS NOTES
Inpatient Rehabilitation Plan of Care Note    Plan of Care  Care Plan Reviewed - No updates at this time.    Sphincter Control    Performed Intervention(s)  Monitor intake and output  Encourage fluid intake  Elimination schedule    Signed by: Dimitry Baldwin RN

## 2019-08-10 NOTE — PROGRESS NOTES
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  .  No new weakness noted.  She gets frustrated at times with her aphasia.  She is not able to identify any focal complaint.  Does not appear to complain of any headache.  She is ambulating 200 feet contact-guard assist.    History taken from: patient chart RN    Objective     Vital Signs  Temp:  [97.7 °F (36.5 °C)-99.3 °F (37.4 °C)] 97.7 °F (36.5 °C)  Heart Rate:  [70-74] 70  Resp:  [16] 16  BP: (126-175)/(60-82) 142/60    Objective  Physical Exam  MENTAL STATUS -  AWAKE / ALERT  HEENT- NCAT,   SCLERA NON-ICTERIC, CONJUNCTIVA PINK, OP MOIST, NO JVD, EARS UNREMARKABLE EXTERNALLY  LUNGS - CTA, NO WHEEZES, RALES OR RHONCHI  HEART- RRR, NO RUB, MURMUR, OR GALLOP  ABD - NORMOACTIVE BOWEL SOUNDS, SOFT, NT.    EXT - NO EDEMA OR CYANOSIS  NEURO -alert.  Aphasia.  She will get occasional single word     she will follow some simple commands at times..        MOTOR EXAM -patient moves extremities and takes resistance bilaterally         Results Review:     I reviewed the patient's new clinical results.  Glucose   Date/Time Value Ref Range Status   08/10/2019 1601 150 (H) 70 - 130 mg/dL Final   08/10/2019 1133 114 70 - 130 mg/dL Final   08/10/2019 0647 178 (H) 70 - 130 mg/dL Final   08/09/2019 2236 154 (H) 70 - 130 mg/dL Final   08/09/2019 2031 340 (H) 70 - 130 mg/dL Final    08/09/2019 1633 95 70 - 130 mg/dL Final   08/09/2019 1113 209 (H) 70 - 130 mg/dL Final   08/09/2019 0705 152 (H) 70 - 130 mg/dL Final     Results from last 7 days   Lab Units 08/09/19  0729 08/07/19  0801 08/05/19  0726   WBC 10*3/mm3 7.10 7.17 5.40   HEMOGLOBIN g/dL 9.6* 9.6* 9.8*   HEMATOCRIT % 30.0* 31.3* 30.8*   PLATELETS 10*3/mm3 315 374 419       Ref. Range 5/22/2019 05:44 5/22/2019 11:34 7/9/2019 08:25 7/18/2019 04:49 7/19/2019 05:05 7/23/2019 05:56 8/1/2019 06:48   Hemoglobin Latest Ref Range: 12.0 - 15.9 g/dL 10.8 (L)  10.6 (L) 8.5 (L) 9.7 (L) 9.3 (L) 7.4 (L)   Hematocrit Latest Ref Range: 34.0 - 46.6 % 35.1  33.4 (L) 26.2 (L) 29.9 (L) 28.6 (L) 23.6 (L)     Results from last 7 days   Lab Units 08/09/19  0729 08/07/19  0801 08/05/19  0726   SODIUM mmol/L 138 139 136   POTASSIUM mmol/L 3.4* 3.6 3.4*   CHLORIDE mmol/L 99 99 99   CO2 mmol/L 27.9 27.5 27.3   BUN mg/dL 15 14 18   CREATININE mg/dL 0.67 0.69 0.87   CALCIUM mg/dL 8.6 8.9 9.1   GLUCOSE mg/dL 168* 201* 276*         Ref. Range 8/1/2019 06:47   25 Hydroxy, Vitamin D Latest Ref Range: 30.0 - 100.0 ng/ml 21.8 (L)       Ref. Range 8/1/2019 06:47   Total Cholesterol Latest Ref Range: 0 - 200 mg/dL 105   HDL Cholesterol Latest Ref Range: 40 - 60 mg/dL 40   LDL Cholesterol  Latest Ref Range: 0 - 100 mg/dL 57   VLDL Cholesterol Latest Ref Range: 5 - 40 mg/dL 8   Triglycerides Latest Ref Range: 0 - 150 mg/dL 40   Medication Review: done  Scheduled Meds:    amantadine 100 mg Oral QAM   apixaban 5 mg Oral Q12H   aspirin 325 mg Oral Daily   atorvastatin 80 mg Oral Nightly   brimonidine 1 drop Both Eyes BID   dorzolamide 1 drop Both Eyes BID   glipiZIDE 5 mg Oral BID AC   hydrALAZINE 20 mg Oral Q8H   insulin regular 0-14 Units Subcutaneous TID AC   lansoprazole 30 mg Oral BID AC   losartan-HCTZ (HYZAAR) 100-12.5 combo dose  Oral Daily   metFORMIN 1,000 mg Oral BID With Meals   nebivolol 20 mg Oral BID   nystatin 5 mL Swish & Spit 4x Daily   vitamin D 50,000 Units  Oral Q7 Days     Continuous Infusions:   PRN Meds:.•  aluminum-magnesium hydroxide-simethicone  •  dextrose  •  dextrose  •  glucagon (human recombinant)  •  nitroglycerin      Assessment/Plan       Stroke (cerebrum) (CMS/HCC)      Assessment & Plan  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  If add Namenda, will probably discontinue amantadine as she continues alert.    Dysphagia-Cortrak feeding tube discontinued on July 31 and started on puréed/honey thick liquid diet with strategies  August 7-advance to [employer] mash nectar thick liquid diet, consistent carbohydrate.    History of multiple bilateral strokes and left central retinal artery occlusion    History of left greater than right internal carotid artery stenosis-status post left internal carotid artery stent July 17, 2019    History of hypertension -  Hyzaar 100-25. August 4 - Bystolic increased to 20 mg daily to 20 mg bid.  August 8 - BP still runs high. On discharge in May 2019 was on Hyzaar 100-25 mg daily, Bystolic 20 mg daily, amlodipine 10 mg daily, Hydralazine 50 mg q 8 hours.  Will add Hydralazine initially 10 mg po q 8 hours and titrate up as needed.   August 9-systolic blood pressure elevated last night and early this morning but better this afternoon at 122-130.  Continue to follow recent addition of hydralazine and titrate up as needed  August 10-systolic blood pressure 175 this afternoon, range otherwise 122-142.  Will titrate up on hydralazine to 20 mg every 8 hours.    History of diabetes mellitus -Lantus/glipizide (home medication metformin 1000 mg twice daily and glipizide 10 mg twice daily)  August 1-blood sugars were low yesterday afternoon after tube feeds discontinued.  Held glipizide last night and this morning and Lantus last night.  Blood sugar elevated at  noontime today.  Will receive resume glipizide at a lower dose of 5 mg twice a day with meals.  Continue sliding scale insulin coverage.  She also is on metformin at home.  August 5-add back metformin initially at 500 mg twice a day and continue glipizide 5 mg twice a day, both one half of previous home dose, titrate up as needed.  August 7-titrate up metformin to 850 mg twice a day.  Add consistent carbohydrate restriction to diet.  August 9-increase metformin to 1000 g twice a day.  August 10-blood glucose 340 last evening but 150-114-178 so far today    Stroke prophylaxis-aspirin/Eliquis/atorvastatin    Anemia-August 1-hemoglobin 7.4.  Most recent check on July 23 HGB 9.3.  Will recheck hemoglobin this afternoon.  Hemoccult stool.  Iron studies.  Reticulocyte count.  Recheck CBC in the a.m.  Added Protonix.  Patient is on aspirin and Eliquis.  With recent stroke  will look to transfuse packed red blood cell.  August 2-hemoglobin improved 9.0-1 unit packed red blood cells.  Elevated reticulocyte count in response to anemia.  Nursing describes dark stool.  Patient discussed with gastroenterology.  At this point unable to do endoscopy as on Eliquis and aspirin.  Will treat symptomatically for now with increasing proton pump inhibitor and transfusing as needed.  August 5-anemia improved-hemoglobin 9.8    DVT prophylaxis-SCDs/anticoagulation    Impulsivity-   Nursing to do re-orientation with the patient.  Room close to the nursing station.    Endocrine-vitamin D deficiency-ergocalciferol 50,000 units weekly x8 weeks added. Vitamin B12 level and TSH checked and late May unremarkable     Impaired cognition/impaired language, impaired swallow, impaired activity daily living, impaired mobility     Now admit for comprehensive acute inpatient rehabilitation .  This would be an interdisciplinary program with physical therapy 1 hour,  occupational therapy 1 hour, and speech therapy 1 hour, 5 days a week.  Rehabilitation  nursing for carryover, monitoring of cardiopulmonary and neurologic   status, bowel and bladder, and skin  Ongoing physician follow-up.  Weekly team conferences.  Goals are indeterminant.   Rehabilitation prognosis determined.  Medical prognosis determined.  Estimated length of stay is indeterminate.    TEAM CONF - AUGUST 6- TRANFERS CTG. GAIT UP  FEET CTG-MIN ASSIST. UBD MIN. LBD MOD. BATH MOD. SEVERE APHASIA. INCREASED RESISTANCE TO PARTICIPATE AT TIMES.   PUREE/HTL.  GOOD PO INTAKE. ADJUSTING MEDS FOR DIABETES MELLITUS.  BLADDER CONTINENT/INCONTIENT.   ELOS- 2 WEEKS.       Ajit Howard MD  08/10/19  5:20 PM    Time:

## 2019-08-10 NOTE — PROGRESS NOTES
Inpatient Rehabilitation Functional Measures Assessment    Functional Measures  KATI Eating:  [place]  KATI Grooming: [place]  [facility] Bathing:  [place]  [facility] Upper Body Dressing:  [place]  [facility] Lower Body Dressing:  [place]  [facility] Toileting:  Toileting Score = 4.  Patient requires minimal assistance for  toileting, such as steadying for balance while cleansing or adjusting clothes.  Patient requires the following assistive device(s): Grab bar.    KATI Bladder Management  Level of Assistance:  Bladder Score = 2. Patient performs 25-49% of tasks and  requires maximal assistance for bladder management. [place] provides most of the  assist to apply AND remove brief.  Frequency/Number of Accidents this Shift:  Bladder accidents this shift:  0 .  Patient has not had an accident, but used a device/medication this shift  requiring: Brief .    KATI Bowel Management  Level of Assistance: Bowel Score = 1. Patient performs less than 25% of tasks  and requires total assistance for bowel management. [place] provides total assist  to completely apply and remove brief.  Frequency/Number of Accidents this Shift: Bowel accidents this shift: 0 .  Patient has not had an accident, but used a device/medication this shift  requiring: Brief .    KATI Bed/Chair/Wheelchair Transfer:  Bed/chair/wheelchair Transfer Score = 4.  Patient performs 75% or more of effort and minimal assistance (little/incidental  help/lifting of one limb/steadying) for transferring to and from the  bed/chair/wheelchair, requiring: Steadying. Contact guard. Hand placement. No  assistive devices were required.  KATI Toilet Transfer:  Toilet Transfer Score = 4.  Patient performs 75% or more  of effort and minimal assistance (little/incidental help/steadying) for  transferring to and from the toilet/commode, requiring: Steadying. Contact  guard. Hand placement. Patient requires the following assistive device(s): Grab  bars.  KATI Tub/Shower Transfer:  [place]    Previously Documented Mode of  Locomotion at Discharge: [place]  KATI Expected Mode of Locomotion at Discharge: [place]  [facility] Walk/Wheelchair:  [place]  KATI Stairs:  [place]    KATI Comprehension:  [place]  KATI Expression:  [place]  [facility] Social Interaction:  [place]  [facility] Problem Solving:  [place]  [facility] Memory:  [place]    Therapy Mode Minutes  Occupational Therapy: [place]  Physical Therapy: [place]  Speech Language Pathology:  [place]    Signed by: NUNU Ulloa

## 2019-08-10 NOTE — PROGRESS NOTES
Inpatient Rehabilitation Functional Measures Assessment and Plan of Care    Plan of Care  Updated Problems/Interventions  Field    Functional Measures  KATI Eating:  Eating Score = 5. Patient is supervision/set-up for eating,  requiring: Stand by assistance. Verbal cuing, prompting, or instructing. Opening  containers. Pouring liquids. No assistive devices were required.  KATI Grooming: [place]  KATI Bathing:  Patient took sponge bath. Bathing Score = 5.  Patient is  supervision/set-up for bathing, requiring: Standing by. Verbal cuing, prompting,  or instructing. Preparing the water. Preparing washing materials. Patient  requires the following assistive device(s): Grab bar/arm rest to maintain  balance.  KATI Upper Body Dressing:  [place]  KATI Lower Body Dressing:  [place]  KATI Toileting:  Toileting Score = 5.  Patient is supervision/set-up for  toileting, requiring: Stand by assistance. Verbal cuing, prompting, or  instructing. Patient requires the following assistive device(s): Grab bar. Arm  rest of a specialized seat.    KATI Bladder Management  Level of Assistance:  Bladder Score = 5.  Patient is supervision/set-up for  bladder management, requiring: Stand by assistance. Verbal cuing, prompting, or  instructing. Patient requires the following assistive device(s):  Adult brief.  Frequency/Number of Accidents this Shift:  Bladder accidents this shift:  1 .    KATI Bowel Management  Level of Assistance: Bowel Score = 5.  Patient is supervision/set-up for bowel  management, requiring: Stand by assistance. Verbal cuing, prompting, or  instructing. Patient requires the following assistive device(s):  Adult brief.  Frequency/Number of Accidents this Shift: Bowel accidents this shift: 0 .  Patient has not had an accident, but used a device/medication this shift  requiring: ADULT BRIEF .    KATI Bed/Chair/Wheelchair Transfer:  Bed/chair/wheelchair Transfer Score = 5.  Patient is supervision/set-up for transferring to and from  the  bed/chair/wheelchair, requiring: Stand by assistance. Verbal cuing, prompting,  or instructing. Set up (positioning equipment, lock brakes and/or adjust foot  rest). Patient requires the following assistive device(s): Bed rails. Arm rest.  Seating system of wheelchair.  KATI Toilet Transfer:  Toilet Transfer Score = 5.  Patient is supervision/set-up  for transferring to and from the toilet/commode, requiring: Stand by assistance.  Verbal cuing, prompting, or instructing. Patient requires the following  assistive device(s): Grab bars.  KATI Tub/Shower Transfer:  [place]    Previously Documented Mode of Locomotion at Discharge: [place]  KATI Expected Mode of Locomotion at Discharge: [place]  [facility] Walk/Wheelchair:  [place]  [facility] Stairs:  [place]    [facility] Comprehension:  [place]  KATI Expression:  [place]  KATI Social Interaction:  [place]  [facility] Problem Solving:  [place]  KATI Memory:  [place]    Therapy Mode Minutes  Occupational Therapy: [place]  Physical Therapy: [place]  Speech Language Pathology:  [place]    Signed by: NUNU Restrepo

## 2019-08-10 NOTE — PLAN OF CARE
Problem: Skin Injury Risk (Adult)  Goal: Skin Health and Integrity  Outcome: Ongoing (interventions implemented as appropriate)      Problem: Fall Risk (Adult)  Goal: Absence of Fall  Outcome: Ongoing (interventions implemented as appropriate)      Problem: Patient Care Overview  Goal: Plan of Care Review  Outcome: Ongoing (interventions implemented as appropriate)   08/09/19 0234 08/09/19 2111 08/10/19 0053   Patient Care Overview   IRF Plan of Care Review progress ongoing, continue --  --    Progress, Functional Goals demonstrating adequate progress --  --    Coping/Psychosocial   Plan of Care Reviewed With --  patient --    OTHER   Outcome Summary --  --  Pt using Yes/No answers, forgetful of call light use. Meds whole with applesauce 1 at a time with larger pill cut in half.  at HS, then 154 at approx. 2230. Ambulates CGA 1.     Goal: Individualization and Mutuality  Outcome: Ongoing (interventions implemented as appropriate)    Goal: Discharge Needs Assessment  Outcome: Ongoing (interventions implemented as appropriate)    Goal: Coping Plan  Outcome: Ongoing (interventions implemented as appropriate)      Problem: Stroke (IRF) (Adult)  Goal: Promote Optimal Functional [place]  Outcome: Ongoing (interventions implemented as appropriate)

## 2019-08-10 NOTE — PLAN OF CARE
Problem: Fall Risk (Adult)  Goal: Absence of Fall  Outcome: Ongoing (interventions implemented as appropriate)   08/10/19 1827   Fall Risk (Adult)   Absence of Fall making progress toward outcome       Problem: Patient Care Overview  Goal: Plan of Care Review  Outcome: Ongoing (interventions implemented as appropriate)   08/10/19 1827   Patient Care Overview   IRF Plan of Care Review progress ongoing, continue   Progress, Functional Goals demonstrating adequate progress   Coping/Psychosocial   Plan of Care Reviewed With patient   OTHER   Outcome Summary Patient confused and impulsive, at nurses staion on and off between 3p to 6p, no pain and meds crushed in applesauce.        Problem: Stroke (IRF) (Adult)  Goal: Promote Optimal Functional [place]   08/10/19 1827   Stroke (IRF) (Adult)   Promote Optimal Functional [place] demonstrating adequate progress

## 2019-08-10 NOTE — THERAPY TREATMENT NOTE
Inpatient Rehabilitation - Physical Therapy Treatment Note  [facility]     Patient Name: Darby Workman  : 1945  MRN: [id number]    Today's Date: 8/10/2019                 Admit Date: 2019      Visit Dx:    No diagnosis found.    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)   • Right-sided headache   • Acute ischemic stroke (CMS/HCC)   • Embolic stroke (CMS/HCC)   • Cerebral infarction due to stenosis of left carotid artery (CMS/HCC)   • Anticoagulated by anticoagulation treatment   • Stroke (cerebrum) (CMS/HCC)       Therapy Treatment    IRF Treatment Summary     Row Name 08/10/19 1111             Evaluation/Treatment Time and Intent    Subjective Information  no complaints  -MD      Existing Precautions/Restrictions  fall  -MD      Document Type  therapy note (daily note)  -MD      Mode of Treatment  physical therapy  -MD      Patient/Family Observations  Pt seated in WC showing no signs of acute distress.   present throughout tx  -MD      Recorded by [MD] Mira Chadwick, PT      Row Name 08/10/19 1111             Cognition/Psychosocial- PT/OT    Orientation Status (Cognition)  unable/difficult to assess  -MD      Follows Commands (Cognition)  follows one step commands;0-24% accuracy  -MD      Personal Safety Interventions  gait belt;fall prevention program maintained  -MD      Recorded by [MD] Mira Chadwick PT      Row Name 08/10/19 1111             Sit-Stand Transfer    Sit-Stand [place] (Transfers)  contact guard  -MD      Recorded by [MD] Mira Chadwick PT      Row Name 08/10/19 1111             Stand-Sit Transfer    Stand-Sit [place] (Transfers)  contact guard  -MD      Recorded by [MD] Mira Chadwick PT      Row Name 08/10/19 1111             Gait/Stairs Assessment/Training    [place] Level (Gait)  contact  guard  -MD      Distance in Feet (Gait)  80x2 and 200` x2  -MD      Pattern (Gait)  step-through  -MD      Deviations/Abnormal Patterns (Gait)  festinating/shuffling;eliza decreased  -MD      Bilateral Gait Deviations  forward flexed posture;heel strike decreased  -MD      [place] Level (Stairs)  verbal cues;contact guard  -MD      Handrail Location (Stairs)  both sides  -MD      Number of Steps (Stairs)  4  -MD      Ascending Technique (Stairs)  step-over-step  -MD      Descending Technique (Stairs)  step-to-step  -MD      Recorded by [MD] Mira Chadwick, PT      Row Name 08/10/19 1111             Pain Scale: Numbers Pre/Post-Treatment    Pain Scale: Numbers, Pretreatment  0/10 - no pain  -MD      Recorded by [MD] Mira Chadwick, PT      Row Name 08/10/19 1111             Standing Balance Activity    Activities Performed (Standing, Balance Training)  standing ball toss  -MD      Support Needed for Balance (Standing, Balance Training)  minimal external support for balance, 75% patient effort  -MD      Progressive Balance Activity (Standing, Balance Training)  other (see comments) standing on foam square  -MD      Comment (Standing, Balance Training)  batting balloon while standing on foam   -MD      Recorded by [MD] Mira Chadwick, PT      Row Name 08/10/19 1111             Dynamic Balance Activity    Therapeutic Training Performed (Dynamic Balance)  side stepping  -MD      Support Needed for Balance (Dynamic Balance Training)  CGA  -MD      Upper Extremity Activity with Device (Dynamic Balance Training)  other (see comments) neftali bars  -MD      Comment (Dynamic Balance Training)  attempted backwards walking but pt unable to understand VC/TC to complete task  -MD      Recorded by [MD] Mira Chadwick, PT      Row Name 08/10/19 1111             Lower Extremity Standing Therapeutic Exercise    Performed, Standing Lower Extremity (Therapeutic Exercise)  hip flexion/extension;knee flexion/extension;heel raises  -MD      Device,  Standing Lower Extremity (Therapeutic Exercise)  neftali bars  -MD      Exercise Type, Standing Lower Extremity (Therapeutic Exercise)  AROM (active range of motion)  -MD      Sets/Reps Detail, Standing Lower Extremity (Therapeutic Exercise)  1/10  -MD      Comment, Standing Lower Extremity (Therapeutic Exercise)  demonstration helps w pt understanding  -MD      Recorded by [MD] Mira Chadwick PT      Row Name 08/10/19 1111             Lower Extremity Seated Therapeutic Exercise    Performed, Seated Lower Extremity (Therapeutic Exercise)  hip flexion/extension;hip abduction/adduction;knee flexion/extension;ankle dorsiflexion/plantarflexion;LAQ (long arc quad), knee extension  -MD      Exercise Type, Seated Lower Extremity (Therapeutic Exercise)  AROM (active range of motion)  -MD      Sets/Reps Detail, Seated Lower Extremity (Therapeutic Exercise)  1/ -MD      Recorded by [MD] Mira Chadwick PT      Row Name 08/10/19 1111             Positioning and Restraints    Pre-Treatment Position  sitting in chair/recliner  -MD      Post Treatment Position  other  -MD      Other Position  return to room with caregiver  -MD      Recorded by [MD] Mira Chadwick PT        User Key  (r) = Recorded By, (t) = Taken By, (c) = Cosigned By    Initials Name Effective Dates    Mira Vaca PT 04/03/18 -         Wound 07/17/19 1015 Left neck incision (Active)   Dressing Appearance open to air 8/10/2019  8:28 AM   Closure Liquid skin adhesive 8/10/2019  8:28 AM   Base dry;clean 8/10/2019  8:28 AM   Drainage Amount none 8/9/2019  9:11 PM     Physical Therapy Education     Title: PT OT SLP Therapies (Not Started)     Topic: Physical Therapy (In Progress)     Point: Mobility training (In Progress)     Learning Progress Summary           Patient Nonacceptance, E,TB,D, NR by ENID at 8/5/2019 12:00 PM    Acceptance, E,D, NR by NESTOR at 8/3/2019  1:35 PM    Acceptance, D, DU,NR by NESTOR at 8/3/2019  8:56 AM                   Point: Home exercise program (In  Progress)     Learning Progress Summary           Patient Nonacceptance, E,TB,D, NR by ENID at 8/5/2019 12:00 PM                   Point: Precautions (In Progress)     Learning Progress Summary           Patient Acceptance, E, NR by MD at 8/10/2019 11:15 AM    Acceptance, E, NR by MD at 8/9/2019 11:12 AM    Acceptance, E, NR by MD at 8/8/2019 11:48 AM    Acceptance, E, NR by MD at 8/6/2019 10:41 AM    Acceptance, E, NR by MD at 8/2/2019 10:44 AM    Acceptance, E,D, NR by MD at 8/1/2019 12:16 PM                               User Key     Initials Effective Dates Name Provider Type Discipline     04/03/18 -  Nicole Austin, PT Physical Therapist PT    MD 04/03/18 -  Mira Chadwick PT Physical Therapist PT    ENID 04/03/18 -  Marycruz Schmitt PT Physical Therapist PT                  PT Recommendation and Plan  Anticipated Equipment Needs at Discharge (PT Eval): front wheeled walker  Frequency of Treatment (PT Eval): 5 times per week, 60 minutes per session                     Time Calculation:     PT Charges     Row Name 08/10/19 1110             Time Calculation    Start Time  1100  -MD      Stop Time  1130  -MD      Time Calculation (min)  30 min  -MD      PT Received On  08/10/19  -MD      PT - Next Appointment  08/12/19  -MD        User Key  (r) = Recorded By, (t) = Taken By, (c) = Cosigned By    Initials Name Provider Type    Mira Vaca, PT Physical Therapist          Therapy Charges for Today     Code Description Service Date Service Provider Modifiers Qty    96811569643 HC PT THER PROC EA 15 MIN 8/9/2019 Mira Chadwick, PT GP 4    20741771420 HC PT THER PROC EA 15 MIN 8/10/2019 Mira Chadwick, PT GP 2    14428264649 HC PT THER SUPP EA 15 MIN 8/10/2019 Mira Chadwick, PT GP 1                   Mira Chadwick, HESHAM  8/10/2019

## 2019-08-11 ENCOUNTER — APPOINTMENT (OUTPATIENT)
Dept: CT IMAGING | Facility: HOSPITAL | Age: 74
End: 2019-08-11

## 2019-08-11 LAB
GLUCOSE BLDC GLUCOMTR-MCNC: 129 MG/DL (ref 70–130)
GLUCOSE BLDC GLUCOMTR-MCNC: 152 MG/DL (ref 70–130)
GLUCOSE BLDC GLUCOMTR-MCNC: 168 MG/DL (ref 70–130)
GLUCOSE BLDC GLUCOMTR-MCNC: 194 MG/DL (ref 70–130)

## 2019-08-11 PROCEDURE — 82962 GLUCOSE BLOOD TEST: CPT

## 2019-08-11 PROCEDURE — 70450 CT HEAD/BRAIN W/O DYE: CPT

## 2019-08-11 PROCEDURE — 63710000001 INSULIN REGULAR HUMAN PER 5 UNITS: Performed by: PHYSICAL MEDICINE & REHABILITATION

## 2019-08-11 RX ORDER — HYDRALAZINE HYDROCHLORIDE 25 MG/1
25 TABLET, FILM COATED ORAL EVERY 8 HOURS SCHEDULED
Status: DISCONTINUED | OUTPATIENT
Start: 2019-08-11 | End: 2019-08-14

## 2019-08-11 RX ORDER — MEMANTINE HYDROCHLORIDE 5 MG/1
5 TABLET ORAL DAILY
Status: DISCONTINUED | OUTPATIENT
Start: 2019-08-12 | End: 2019-08-20

## 2019-08-11 RX ADMIN — NYSTATIN 500000 UNITS: 100000 SUSPENSION ORAL at 18:12

## 2019-08-11 RX ADMIN — DORZOLAMIDE HYDROCHLORIDE 1 DROP: 20 SOLUTION/ DROPS OPHTHALMIC at 22:08

## 2019-08-11 RX ADMIN — METFORMIN HYDROCHLORIDE 1000 MG: 1000 TABLET ORAL at 07:36

## 2019-08-11 RX ADMIN — NEBIVOLOL HYDROCHLORIDE 20 MG: 10 TABLET ORAL at 07:36

## 2019-08-11 RX ADMIN — METFORMIN HYDROCHLORIDE 1000 MG: 1000 TABLET ORAL at 18:12

## 2019-08-11 RX ADMIN — INSULIN HUMAN 3 UNITS: 100 INJECTION, SOLUTION PARENTERAL at 07:34

## 2019-08-11 RX ADMIN — GLIPIZIDE 5 MG: 5 TABLET ORAL at 07:35

## 2019-08-11 RX ADMIN — HYDRALAZINE HYDROCHLORIDE 20 MG: 10 TABLET, FILM COATED ORAL at 05:24

## 2019-08-11 RX ADMIN — ATORVASTATIN CALCIUM 80 MG: 80 TABLET, FILM COATED ORAL at 22:08

## 2019-08-11 RX ADMIN — DORZOLAMIDE HYDROCHLORIDE 1 DROP: 20 SOLUTION/ DROPS OPHTHALMIC at 07:35

## 2019-08-11 RX ADMIN — APIXABAN 5 MG: 5 TABLET, FILM COATED ORAL at 07:35

## 2019-08-11 RX ADMIN — LOSARTAN POTASSIUM: 50 TABLET, FILM COATED ORAL at 07:37

## 2019-08-11 RX ADMIN — ASPIRIN 325 MG: 325 TABLET ORAL at 07:36

## 2019-08-11 RX ADMIN — INSULIN HUMAN 3 UNITS: 100 INJECTION, SOLUTION PARENTERAL at 12:10

## 2019-08-11 RX ADMIN — GLIPIZIDE 5 MG: 5 TABLET ORAL at 18:12

## 2019-08-11 RX ADMIN — NYSTATIN 500000 UNITS: 100000 SUSPENSION ORAL at 07:38

## 2019-08-11 RX ADMIN — HYDRALAZINE HYDROCHLORIDE 25 MG: 25 TABLET, FILM COATED ORAL at 22:07

## 2019-08-11 RX ADMIN — LANSOPRAZOLE 30 MG: KIT at 18:12

## 2019-08-11 RX ADMIN — NYSTATIN 500000 UNITS: 100000 SUSPENSION ORAL at 12:11

## 2019-08-11 RX ADMIN — BRIMONIDINE TARTRATE 1 DROP: 2 SOLUTION OPHTHALMIC at 22:09

## 2019-08-11 RX ADMIN — APIXABAN 5 MG: 5 TABLET, FILM COATED ORAL at 22:08

## 2019-08-11 RX ADMIN — BRIMONIDINE TARTRATE 1 DROP: 2 SOLUTION OPHTHALMIC at 07:35

## 2019-08-11 RX ADMIN — LANSOPRAZOLE 30 MG: KIT at 05:24

## 2019-08-11 RX ADMIN — NYSTATIN 500000 UNITS: 100000 SUSPENSION ORAL at 22:08

## 2019-08-11 RX ADMIN — AMANTADINE HYDROCHLORIDE 100 MG: 100 CAPSULE ORAL at 05:24

## 2019-08-11 RX ADMIN — HYDRALAZINE HYDROCHLORIDE 25 MG: 25 TABLET, FILM COATED ORAL at 14:59

## 2019-08-11 RX ADMIN — NEBIVOLOL HYDROCHLORIDE 20 MG: 10 TABLET ORAL at 22:07

## 2019-08-11 NOTE — PLAN OF CARE
Problem: Skin Injury Risk (Adult)  Goal: Skin Health and Integrity  Outcome: Ongoing (interventions implemented as appropriate)      Problem: Fall Risk (Adult)  Goal: Absence of Fall  Outcome: Ongoing (interventions implemented as appropriate)      Problem: Patient Care Overview  Goal: Plan of Care Review  Outcome: Ongoing (interventions implemented as appropriate)   08/11/19 1547   Patient Care Overview   IRF Plan of Care Review progress ongoing, continue   Progress, Functional Goals demonstrating adequate progress   Coping/Psychosocial   Plan of Care Reviewed With patient   OTHER   Outcome Summary Pt impulsive at times. Global aphasia makes communication very difficult. Family visited. Meds crushed in pudding.

## 2019-08-11 NOTE — PLAN OF CARE
Problem: Skin Injury Risk (Adult)  Goal: Skin Health and Integrity  Outcome: Ongoing (interventions implemented as appropriate)      Problem: Fall Risk (Adult)  Goal: Absence of Fall  Outcome: Ongoing (interventions implemented as appropriate)      Problem: Patient Care Overview  Goal: Plan of Care Review  Outcome: Ongoing (interventions implemented as appropriate)   08/11/19 0035   Patient Care Overview   IRF Plan of Care Review progress ongoing, continue   Progress, Functional Goals demonstrating adequate progress   Coping/Psychosocial   Plan of Care Reviewed With patient   OTHER   Outcome Summary Patient is impulsive at times. Confused. Global aphasia. Offered to go to the bathroom while awake. No s/s of pain or discomfort noted. Medication given with pudding and encouraged fluids.       08/11/19 0035   Patient Care Overview   IRF Plan of Care Review progress ongoing, continue   Progress, Functional Goals demonstrating adequate progress   Coping/Psychosocial   Plan of Care Reviewed With patient   OTHER   Outcome Summary Patient is impulsive at times. Confused. Global aphasia. Offered to go to the bathroom while awake. No s/s of pain or discomfort noted. Medication given with pudding and encouraged fluids. Bed alarm patent and set for zone 2.        Problem: Stroke (IRF) (Adult)  Goal: Promote Optimal Functional [place]  Outcome: Ongoing (interventions implemented as appropriate)

## 2019-08-11 NOTE — PROGRESS NOTES
Inpatient Rehabilitation Functional Measures Assessment    Functional Measures  KATI Eating:  [facility] Grooming: [facility] Bathing:  [facility] Upper Body Dressing:  [facility] Lower Body Dressing:  [facility] Toileting:  [facility] Bladder Management  Level of Assistance:  [place]  Frequency/Number of Accidents this Shift:  [facility] Bowel Management  Level of Assistance: [place]  Frequency/Number of Accidents this Shift: [facility] Bed/Chair/Wheelchair Transfer:  [facility] Toilet Transfer:  [facility] Tub/Shower Transfer:  [place]    Previously Documented Mode of Locomotion at Discharge: Field  KATI Expected Mode of Locomotion at Discharge: [facility] Walk/Wheelchair:  [facility] Stairs:  [facility] Comprehension:  Auditory comprehension is the usual mode. Patient does not  comprehend complex/abstract information in their primary language without  assistance from a helper. Comprehension Score = 2, Maximal Prompting. Patient  comprehends basic daily needs 25-49% of the time. Patient understands simple  information via single words or gestures. Requires maximal/a lot of prompting  (most of the time). Patient requires the following assistive device(s): Glasses.    KATI Expression:  Non-vocal expression is the usual mode. Patient does not  express complex/abstract information in their primary language without a helper.  Expression Score = 1, Total Assistance. Patient expresses basic daily needs less  than 25% of the time, or does not express basic needs appropriately or  consistently despite prompting. No assistive devices were required.  KATI Social Interaction:  Social Interaction Score = 2, Maximal Direction.  Patient interacts appropriately 25-49% of the time. Patient requires maximal/a  lot of direction (most of the time) for the following behavior(s):  KATI Problem Solving:  Activity was not observed.  KATI Memory:  Memory Score = 2, Maximal Prompting. Patient recognizes and  remembers 25-49% of  the time. Patient requires maximal/a lot of prompting (most  of the time) for memory for the following: Disoriented to person, place, time or  situation. Inability to follow multi-step commands. Inability to recognize  people or remember instructions/directions requiring short-term recall (minutes,  hours, days). Needs assistance for use of environmental cues. Needs assistance  for use of memory aids.    Therapy Mode Minutes  Occupational Therapy: [facility]  Physical Therapy: [facility]  Speech Language Pathology:  [facility]    Signed by: Eda Allen RN

## 2019-08-11 NOTE — PROGRESS NOTES
Inpatient Rehabilitation Functional Measures Assessment    Functional Measures  KATI Eating:  [facility] Grooming: [facility] Bathing:  [facility] Upper Body Dressing:  [facility] Lower Body Dressing:  [facility] Toileting:  Patient requires maximal assistance for adjusting clothing  before using a toilet, commode, bedpan, or urinal. Patient requires maximal  assistance for hygiene. Patient requires maximal assistance for adjusting  clothing after using a toilet, commode, bedpan, or urinal. Patient performs 0 -  24% of toileting tasks.  Toileting Score = 1, Total Assistance. Patient requires  the following assistive device(s): Grab bar.    [facility] Bladder Management  Level of Assistance:  Bladder Score = 1. Patient performs less than 25% of tasks  and requires total assistance for bladder management. [place] provides total  assist to completely apply and remove brief.  Frequency/Number of Accidents this Shift:  Bladder accidents this shift:  0 .  Patient has not had an accident, but used a device/medication this shift  requiring: Brief .    [facility] Bowel Management  Level of Assistance: Bowel Score = 1. Patient performs less than 25% of tasks  and requires total assistance for bowel management. [place] provides total assist  to completely apply and remove brief.  Frequency/Number of Accidents this Shift: Bowel accidents this shift: 0 .  Patient has not had an accident, but used a device/medication this shift  requiring: Brief .    KATI Bed/Chair/Wheelchair Transfer:  [facility] Toilet Transfer:  Toilet Transfer Score = 4.  Patient performs 75% or more  of effort and minimal assistance (little/incidental help/steadying) for  transferring to and from the toilet/commode, requiring: Steadying. Contact  guard. Hand placement. Patient requires the following assistive device(s):  Contact guard assist . Grab bars.  [facility] Tub/Shower Transfer:  [place]    Previously Documented Mode of Locomotion at Discharge: [place]  [facility] Expected Mode  of Locomotion at Discharge: [place]  KATI Walk/Wheelchair:  [place]  [facility]ATI Stairs:  [place]    KATI Comprehension:  [place]  [facility]ATI Expression:  [place]  [facility]ATI Social Interaction:  [place]  KATI Problem Solving:  [place]  [facility]ATI Memory:  [place]    Therapy Mode Minutes  Occupational Therapy: [place]  Physical Therapy: [place]  Speech Language Pathology:  [place]    Signed by: NUNU Ulloa

## 2019-08-11 NOTE — PROGRESS NOTES
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  .  No new weakness noted.  She denies any headache.  Indicates she is breathing okay.  Staff notes at times has increased irritability, resistance to dissipate with activities.  No new focal weakness physical.  Continues with aphasia which prohibits patient from providing any history verbally.      History taken from: patient chart RN    Objective     Vital Signs  Temp:  [98 °F (36.7 °C)-98.3 °F (36.8 °C)] 98 °F (36.7 °C)  Heart Rate:  [66-74] 74  Resp:  [16-18] 16  BP: (117-160)/(66-73) 117/67    Objective  Physical Exam  MENTAL STATUS -  AWAKE / ALERT  HEENT- NCAT,   SCLERA NON-ICTERIC, CONJUNCTIVA PINK, OP MOIST, NO JVD, EARS UNREMARKABLE EXTERNALLY  LUNGS - CTA, NO WHEEZES, RALES OR RHONCHI  HEART- RRR, NO RUB, MURMUR, OR GALLOP  ABD - NORMOACTIVE BOWEL SOUNDS, SOFT, NT.    EXT - NO EDEMA OR CYANOSIS  NEURO -alert.  Aphasia.  She will get occasional single word     she will follow some simple commands at times, but inconsistent and requires much repetition...        MOTOR EXAM -patient moves extremities and takes resistance bilaterally         Results Review:     I reviewed the patient's new clinical results.  Glucose   Date/Time Value Ref Range Status   08/11/2019 1101 194 (H) 70 - 130 mg/dL Final   08/11/2019 0707 168 (H) 70 - 130 mg/dL Final   08/10/2019 2105  207 (H) 70 - 130 mg/dL Final   08/10/2019 1601 150 (H) 70 - 130 mg/dL Final   08/10/2019 1133 114 70 - 130 mg/dL Final   08/10/2019 0647 178 (H) 70 - 130 mg/dL Final   08/09/2019 2236 154 (H) 70 - 130 mg/dL Final   08/09/2019 2031 340 (H) 70 - 130 mg/dL Final     Results from last 7 days   Lab Units 08/09/19  0729 08/07/19  0801 08/05/19  0726   WBC 10*3/mm3 7.10 7.17 5.40   HEMOGLOBIN g/dL 9.6* 9.6* 9.8*   HEMATOCRIT % 30.0* 31.3* 30.8*   PLATELETS 10*3/mm3 315 374 419       Ref. Range 5/22/2019 05:44 5/22/2019 11:34 7/9/2019 08:25 7/18/2019 04:49 7/19/2019 05:05 7/23/2019 05:56 8/1/2019 06:48   Hemoglobin Latest Ref Range: 12.0 - 15.9 g/dL 10.8 (L)  10.6 (L) 8.5 (L) 9.7 (L) 9.3 (L) 7.4 (L)   Hematocrit Latest Ref Range: 34.0 - 46.6 % 35.1  33.4 (L) 26.2 (L) 29.9 (L) 28.6 (L) 23.6 (L)     Results from last 7 days   Lab Units 08/09/19  0729 08/07/19  0801 08/05/19  0726   SODIUM mmol/L 138 139 136   POTASSIUM mmol/L 3.4* 3.6 3.4*   CHLORIDE mmol/L 99 99 99   CO2 mmol/L 27.9 27.5 27.3   BUN mg/dL 15 14 18   CREATININE mg/dL 0.67 0.69 0.87   CALCIUM mg/dL 8.6 8.9 9.1   GLUCOSE mg/dL 168* 201* 276*         Ref. Range 8/1/2019 06:47   25 Hydroxy, Vitamin D Latest Ref Range: 30.0 - 100.0 ng/ml 21.8 (L)       Ref. Range 8/1/2019 06:47   Total Cholesterol Latest Ref Range: 0 - 200 mg/dL 105   HDL Cholesterol Latest Ref Range: 40 - 60 mg/dL 40   LDL Cholesterol  Latest Ref Range: 0 - 100 mg/dL 57   VLDL Cholesterol Latest Ref Range: 5 - 40 mg/dL 8   Triglycerides Latest Ref Range: 0 - 150 mg/dL 40   Medication Review: done  Scheduled Meds:    apixaban 5 mg Oral Q12H   aspirin 325 mg Oral Daily   atorvastatin 80 mg Oral Nightly   brimonidine 1 drop Both Eyes BID   dorzolamide 1 drop Both Eyes BID   glipiZIDE 5 mg Oral BID AC   hydrALAZINE 25 mg Oral Q8H   insulin regular 0-14 Units Subcutaneous TID AC   lansoprazole 30 mg Oral BID AC   losartan-HCTZ (HYZAAR) 100-12.5 combo dose  Oral Daily   [START ON 8/12/2019] memantine 5  mg Oral Daily   metFORMIN 1,000 mg Oral BID With Meals   nebivolol 20 mg Oral BID   nystatin 5 mL Swish & Spit 4x Daily   vitamin D 50,000 Units Oral Q7 Days     Continuous Infusions:   PRN Meds:.•  aluminum-magnesium hydroxide-simethicone  •  dextrose  •  dextrose  •  glucagon (human recombinant)  •  nitroglycerin      Assessment/Plan       Stroke (cerebrum) (CMS/HCC)      Assessment & Plan  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  If add Namenda, will probably discontinue amantadine as she continues alert.  August 11-she has some increased irritability at times.  This may be related to the underlying stroke deficits itself.  She does not appear to have any dysuria, no odor to her urine.  Staff reports that for the most part she is eating okay.  Will check a follow-up CT of the head to assess for any interval visual changes.  She is on aspirin and Eliquis for stroke prophylaxis.  She had noticeably improved alertness when amantadine was started.  She is readily alert now.  This may be related to natural recovery in terms of her level of arousal at this point.  Current plan is to discontinue the amantadine to see if that helps with her irritability started on Namenda for aphasia.    Dysphagia-Cortrak feeding tube discontinued on July 31 and started on puréed/honey thick liquid diet with strategies  August 7-advance to fork mash nectar thick liquid diet, consistent carbohydrate.    History of multiple bilateral strokes and left central retinal artery occlusion    History of left greater than right internal carotid artery stenosis-status post left internal carotid artery stent July 17, 2019    History of hypertension -  Hyzaar 100-25. August 4 - Bystolic increased to 20 mg daily to 20 mg bid.  August 8 - BP still runs high. On discharge  in May 2019 was on Hyzaar 100-25 mg daily, Bystolic 20 mg daily, amlodipine 10 mg daily, Hydralazine 50 mg q 8 hours.  Will add Hydralazine initially 10 mg po q 8 hours and titrate up as needed.   August 9-systolic blood pressure elevated last night and early this morning but better this afternoon at 122-130.  Continue to follow recent addition of hydralazine and titrate up as needed  August 10-systolic blood pressure 175 this afternoon, range otherwise 122-142.  Will titrate up on hydralazine to 20 mg every 8 hours.  August 11-hypertension overall appears improved.  Will titrate up further to 25 mg every 8 hours.    History of diabetes mellitus -Lantus/glipizide (home medication metformin 1000 mg twice daily and glipizide 10 mg twice daily)  August 1-blood sugars were low yesterday afternoon after tube feeds discontinued.  Held glipizide last night and this morning and Lantus last night.  Blood sugar elevated at noontime today.  Will receive resume glipizide at a lower dose of 5 mg twice a day with meals.  Continue sliding scale insulin coverage.  She also is on metformin at home.  August 5-add back metformin initially at 500 mg twice a day and continue glipizide 5 mg twice a day, both one half of previous home dose, titrate up as needed.  August 7-titrate up metformin to 850 mg twice a day.  Add consistent carbohydrate restriction to diet.  August 9-increase metformin to 1000 g twice a day.  August 10-blood glucose 340 last evening but 150-114-178 so far today  August 11-continue to follow blood sugar pattern and may increase glipizide further as current dose glipizide 5 mg twice a day along with metformin 1000 mg twice a day    Stroke prophylaxis-aspirin/Eliquis/atorvastatin    Anemia-August 1-hemoglobin 7.4.  Most recent check on July 23 HGB 9.3.  Will recheck hemoglobin this afternoon.  Hemoccult stool.  Iron studies.  Reticulocyte count.  Recheck CBC in the a.m.  Added Protonix.  Patient is on aspirin and  Eliquis.  With recent stroke  will look to transfuse packed red blood cell.  August 2-hemoglobin improved 9.0-1 unit packed red blood cells.  Elevated reticulocyte count in response to anemia.  Nursing describes dark stool.  Patient discussed with gastroenterology.  At this point unable to do endoscopy as on Eliquis and aspirin.  Will treat symptomatically for now with increasing proton pump inhibitor and transfusing as needed.  August 5-anemia improved-hemoglobin 9.8    DVT prophylaxis-SCDs/anticoagulation    Impulsivity-   Nursing to do re-orientation with the patient.  Room close to the nursing station.    Endocrine-vitamin D deficiency-ergocalciferol 50,000 units weekly x8 weeks added. Vitamin B12 level and TSH checked in late May unremarkable     Impaired cognition/impaired language, impaired swallow, impaired activity daily living, impaired mobility     Now admit for comprehensive acute inpatient rehabilitation .  This would be an interdisciplinary program with physical therapy 1 hour,  occupational therapy 1 hour, and speech therapy 1 hour, 5 days a week.  Rehabilitation nursing for carryover, monitoring of cardiopulmonary and neurologic   status, bowel and bladder, and skin  Ongoing physician follow-up.  Weekly team conferences.  Goals are indeterminant.   Rehabilitation prognosis determined.  Medical prognosis determined.  Estimated length of stay is indeterminate.    TEAM CONF - AUGUST 6- TRANFERS CTG. GAIT UP  FEET CTG-MIN ASSIST. UBD MIN. LBD MOD. BATH MOD. SEVERE APHASIA. INCREASED RESISTANCE TO PARTICIPATE AT TIMES.   PUREE/HTL.  GOOD PO INTAKE. ADJUSTING MEDS FOR DIABETES MELLITUS.  BLADDER CONTINENT/INCONTIENT.   ELOS- 2 WEEKS.       Ajit Howard MD  08/11/19  3:03 PM    Time:

## 2019-08-11 NOTE — PROGRESS NOTES
Inpatient Rehabilitation Plan of Care Note    Plan of Care  Care Plan Reviewed - No updates at this time.    Sphincter Control    Performed Intervention(s)  Encourage fluid intake  Elimination schedule    Signed by: Eda Allen RN

## 2019-08-12 LAB
ANION GAP SERPL CALCULATED.3IONS-SCNC: 10.3 MMOL/L (ref 5–15)
BASOPHILS # BLD AUTO: 0.05 10*3/MM3 (ref 0–0.2)
BASOPHILS NFR BLD AUTO: 0.9 % (ref 0–1.5)
BUN BLD-MCNC: 18 MG/DL (ref 8–23)
BUN/CREAT SERPL: 28.6 (ref 7–25)
CALCIUM SPEC-SCNC: 8.6 MG/DL (ref 8.6–10.5)
CHLORIDE SERPL-SCNC: 102 MMOL/L (ref 98–107)
CO2 SERPL-SCNC: 28.7 MMOL/L (ref 22–29)
CREAT BLD-MCNC: 0.63 MG/DL (ref 0.57–1)
DEPRECATED RDW RBC AUTO: 57.2 FL (ref 37–54)
EOSINOPHIL # BLD AUTO: 0.13 10*3/MM3 (ref 0–0.4)
EOSINOPHIL NFR BLD AUTO: 2.4 % (ref 0.3–6.2)
ERYTHROCYTE [DISTWIDTH] IN BLOOD BY AUTOMATED COUNT: 16.6 % (ref 12.3–15.4)
GFR SERPL CREATININE-BSD FRML MDRD: 92 ML/MIN/1.73
GLUCOSE BLD-MCNC: 149 MG/DL (ref 65–99)
GLUCOSE BLDC GLUCOMTR-MCNC: 109 MG/DL (ref 70–130)
GLUCOSE BLDC GLUCOMTR-MCNC: 142 MG/DL (ref 70–130)
GLUCOSE BLDC GLUCOMTR-MCNC: 169 MG/DL (ref 70–130)
GLUCOSE BLDC GLUCOMTR-MCNC: 200 MG/DL (ref 70–130)
HCT VFR BLD AUTO: 29 % (ref 34–46.6)
HGB BLD-MCNC: 9.1 G/DL (ref 12–15.9)
IMM GRANULOCYTES # BLD AUTO: 0.03 10*3/MM3 (ref 0–0.05)
IMM GRANULOCYTES NFR BLD AUTO: 0.5 % (ref 0–0.5)
LYMPHOCYTES # BLD AUTO: 0.99 10*3/MM3 (ref 0.7–3.1)
LYMPHOCYTES NFR BLD AUTO: 17.9 % (ref 19.6–45.3)
MCH RBC QN AUTO: 29.4 PG (ref 26.6–33)
MCHC RBC AUTO-ENTMCNC: 31.4 G/DL (ref 31.5–35.7)
MCV RBC AUTO: 93.5 FL (ref 79–97)
MONOCYTES # BLD AUTO: 0.44 10*3/MM3 (ref 0.1–0.9)
MONOCYTES NFR BLD AUTO: 8 % (ref 5–12)
NEUTROPHILS # BLD AUTO: 3.88 10*3/MM3 (ref 1.7–7)
NEUTROPHILS NFR BLD AUTO: 70.3 % (ref 42.7–76)
NRBC BLD AUTO-RTO: 0 /100 WBC (ref 0–0.2)
PLATELET # BLD AUTO: 263 10*3/MM3 (ref 140–450)
PMV BLD AUTO: 10.5 FL (ref 6–12)
POTASSIUM BLD-SCNC: 3.3 MMOL/L (ref 3.5–5.2)
RBC # BLD AUTO: 3.1 10*6/MM3 (ref 3.77–5.28)
SODIUM BLD-SCNC: 141 MMOL/L (ref 136–145)
WBC NRBC COR # BLD: 5.52 10*3/MM3 (ref 3.4–10.8)

## 2019-08-12 PROCEDURE — 92507 TX SP LANG VOICE COMM INDIV: CPT

## 2019-08-12 PROCEDURE — 97535 SELF CARE MNGMENT TRAINING: CPT

## 2019-08-12 PROCEDURE — 80048 BASIC METABOLIC PNL TOTAL CA: CPT | Performed by: PHYSICAL MEDICINE & REHABILITATION

## 2019-08-12 PROCEDURE — 85025 COMPLETE CBC W/AUTO DIFF WBC: CPT | Performed by: PHYSICAL MEDICINE & REHABILITATION

## 2019-08-12 PROCEDURE — 82962 GLUCOSE BLOOD TEST: CPT

## 2019-08-12 PROCEDURE — 63710000001 INSULIN REGULAR HUMAN PER 5 UNITS: Performed by: PHYSICAL MEDICINE & REHABILITATION

## 2019-08-12 PROCEDURE — 97530 THERAPEUTIC ACTIVITIES: CPT

## 2019-08-12 PROCEDURE — 97110 THERAPEUTIC EXERCISES: CPT

## 2019-08-12 RX ORDER — AMLODIPINE BESYLATE 5 MG/1
5 TABLET ORAL NIGHTLY
Status: DISCONTINUED | OUTPATIENT
Start: 2019-08-12 | End: 2019-08-19

## 2019-08-12 RX ORDER — POTASSIUM CHLORIDE 1.5 G/1.77G
20 POWDER, FOR SOLUTION ORAL
Status: COMPLETED | OUTPATIENT
Start: 2019-08-12 | End: 2019-08-12

## 2019-08-12 RX ORDER — POTASSIUM CHLORIDE 1.5 G/1.77G
20 POWDER, FOR SOLUTION ORAL
Status: DISCONTINUED | OUTPATIENT
Start: 2019-08-13 | End: 2019-08-29 | Stop reason: HOSPADM

## 2019-08-12 RX ADMIN — HYDRALAZINE HYDROCHLORIDE 25 MG: 25 TABLET, FILM COATED ORAL at 21:22

## 2019-08-12 RX ADMIN — NYSTATIN 500000 UNITS: 100000 SUSPENSION ORAL at 17:32

## 2019-08-12 RX ADMIN — APIXABAN 5 MG: 5 TABLET, FILM COATED ORAL at 07:38

## 2019-08-12 RX ADMIN — NYSTATIN 500000 UNITS: 100000 SUSPENSION ORAL at 11:18

## 2019-08-12 RX ADMIN — LANSOPRAZOLE 30 MG: KIT at 17:32

## 2019-08-12 RX ADMIN — NYSTATIN 500000 UNITS: 100000 SUSPENSION ORAL at 21:23

## 2019-08-12 RX ADMIN — NYSTATIN 500000 UNITS: 100000 SUSPENSION ORAL at 07:36

## 2019-08-12 RX ADMIN — LOSARTAN POTASSIUM: 50 TABLET, FILM COATED ORAL at 07:39

## 2019-08-12 RX ADMIN — APIXABAN 5 MG: 5 TABLET, FILM COATED ORAL at 21:22

## 2019-08-12 RX ADMIN — INSULIN HUMAN 3 UNITS: 100 INJECTION, SOLUTION PARENTERAL at 11:18

## 2019-08-12 RX ADMIN — POTASSIUM CHLORIDE 20 MEQ: 1.5 POWDER, FOR SOLUTION ORAL at 18:17

## 2019-08-12 RX ADMIN — METFORMIN HYDROCHLORIDE 1000 MG: 1000 TABLET ORAL at 17:32

## 2019-08-12 RX ADMIN — METFORMIN HYDROCHLORIDE 1000 MG: 1000 TABLET ORAL at 07:35

## 2019-08-12 RX ADMIN — DORZOLAMIDE HYDROCHLORIDE 1 DROP: 20 SOLUTION/ DROPS OPHTHALMIC at 07:40

## 2019-08-12 RX ADMIN — NEBIVOLOL HYDROCHLORIDE 20 MG: 10 TABLET ORAL at 07:38

## 2019-08-12 RX ADMIN — BRIMONIDINE TARTRATE 1 DROP: 2 SOLUTION OPHTHALMIC at 21:23

## 2019-08-12 RX ADMIN — NEBIVOLOL HYDROCHLORIDE 20 MG: 10 TABLET ORAL at 21:22

## 2019-08-12 RX ADMIN — BRIMONIDINE TARTRATE 1 DROP: 2 SOLUTION OPHTHALMIC at 07:41

## 2019-08-12 RX ADMIN — GLIPIZIDE 5 MG: 5 TABLET ORAL at 06:50

## 2019-08-12 RX ADMIN — INSULIN HUMAN 5 UNITS: 100 INJECTION, SOLUTION PARENTERAL at 21:24

## 2019-08-12 RX ADMIN — MEMANTINE HYDROCHLORIDE 5 MG: 5 TABLET, FILM COATED ORAL at 07:37

## 2019-08-12 RX ADMIN — LANSOPRAZOLE 30 MG: KIT at 06:50

## 2019-08-12 RX ADMIN — GLIPIZIDE 5 MG: 5 TABLET ORAL at 17:32

## 2019-08-12 RX ADMIN — HYDRALAZINE HYDROCHLORIDE 25 MG: 25 TABLET, FILM COATED ORAL at 06:49

## 2019-08-12 RX ADMIN — ASPIRIN 325 MG: 325 TABLET ORAL at 07:38

## 2019-08-12 RX ADMIN — ATORVASTATIN CALCIUM 80 MG: 80 TABLET, FILM COATED ORAL at 21:22

## 2019-08-12 RX ADMIN — DORZOLAMIDE HYDROCHLORIDE 1 DROP: 20 SOLUTION/ DROPS OPHTHALMIC at 21:22

## 2019-08-12 RX ADMIN — HYDRALAZINE HYDROCHLORIDE 25 MG: 25 TABLET, FILM COATED ORAL at 13:10

## 2019-08-12 RX ADMIN — AMLODIPINE BESYLATE 5 MG: 5 TABLET ORAL at 22:00

## 2019-08-12 NOTE — PROGRESS NOTES
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   Continues aphasia. Not able to identify any complaint.  Appears anxious today. No change on CT head yesterday.      History taken from: patient chart RN    Objective     Vital Signs  Temp:  [97.8 °F (36.6 °C)-98.3 °F (36.8 °C)] 98.3 °F (36.8 °C)  Heart Rate:  [66-78] 70  Resp:  [16-17] 17  BP: (146-169)/(68-75) 149/69    Objective  Physical Exam  MENTAL STATUS -  AWAKE / ALERT  HEENT- NCAT,   SCLERA NON-ICTERIC, CONJUNCTIVA PINK, OP MOIST, NO JVD, EARS UNREMARKABLE EXTERNALLY  LUNGS - normal respirations  HEART- RRR   ABD -     EXT - NO EDEMA OR CYANOSIS  NEURO -alert.  Aphasia.  She will get occasional single word but largely utterances.  She will occasionally follow some simple commands at times, but inconsistent and requires much repetition...        MOTOR EXAM -patient moves extremities           Results Review:     I reviewed the patient's new clinical results.     CT HEAD - AUGUST 11, 2019  FINDINGS:  Old appearing lacunar type infarcts are again noted about the  right thalamus and basal ganglia regions. There are stable areas of  encephalomalacia in the left parietal and occipital lobes medially.  Generalized atrophy. There are moderately extensive scattered areas of  decreased density in the white matter likely related to chronic ischemic  gliotic changes.    No  hydrocephalus.    Glucose   Date/Time Value Ref Range Status   08/12/2019 1609 109 70 - 130 mg/dL Final   08/12/2019 1101 169 (H) 70 - 130 mg/dL Final   08/12/2019 0608 142 (H) 70 - 130 mg/dL Final   08/11/2019 2039 152 (H) 70 - 130 mg/dL Final   08/11/2019 1558 129 70 - 130 mg/dL Final   08/11/2019 1101 194 (H) 70 - 130 mg/dL Final   08/11/2019 0707 168 (H) 70 - 130 mg/dL Final   08/10/2019 2105 207 (H) 70 - 130 mg/dL Final     Results from last 7 days   Lab Units 08/12/19  0645 08/09/19  0729 08/07/19  0801   WBC 10*3/mm3 5.52 7.10 7.17   HEMOGLOBIN g/dL 9.1* 9.6* 9.6*   HEMATOCRIT % 29.0* 30.0* 31.3*   PLATELETS 10*3/mm3 263 315 374       Ref. Range 5/22/2019 05:44 5/22/2019 11:34 7/9/2019 08:25 7/18/2019 04:49 7/19/2019 05:05 7/23/2019 05:56 8/1/2019 06:48   Hemoglobin Latest Ref Range: 12.0 - 15.9 g/dL 10.8 (L)  10.6 (L) 8.5 (L) 9.7 (L) 9.3 (L) 7.4 (L)   Hematocrit Latest Ref Range: 34.0 - 46.6 % 35.1  33.4 (L) 26.2 (L) 29.9 (L) 28.6 (L) 23.6 (L)     Results from last 7 days   Lab Units 08/12/19  0645 08/09/19  0729 08/07/19  0801   SODIUM mmol/L 141 138 139   POTASSIUM mmol/L 3.3* 3.4* 3.6   CHLORIDE mmol/L 102 99 99   CO2 mmol/L 28.7 27.9 27.5   BUN mg/dL 18 15 14   CREATININE mg/dL 0.63 0.67 0.69   CALCIUM mg/dL 8.6 8.6 8.9   GLUCOSE mg/dL 149* 168* 201*         Ref. Range 8/1/2019 06:47   25 Hydroxy, Vitamin D Latest Ref Range: 30.0 - 100.0 ng/ml 21.8 (L)       Ref. Range 8/1/2019 06:47   Total Cholesterol Latest Ref Range: 0 - 200 mg/dL 105   HDL Cholesterol Latest Ref Range: 40 - 60 mg/dL 40   LDL Cholesterol  Latest Ref Range: 0 - 100 mg/dL 57   VLDL Cholesterol Latest Ref Range: 5 - 40 mg/dL 8   Triglycerides Latest Ref Range: 0 - 150 mg/dL 40   Medication Review: done  Scheduled Meds:    apixaban 5 mg Oral Q12H   aspirin 325 mg Oral Daily   atorvastatin 80 mg Oral Nightly   brimonidine 1 drop Both Eyes BID   dorzolamide 1 drop Both Eyes BID   glipiZIDE 5 mg Oral BID AC   hydrALAZINE 25 mg Oral Q8H    insulin regular 0-14 Units Subcutaneous TID AC   lansoprazole 30 mg Oral BID AC   losartan-HCTZ (HYZAAR) 100-12.5 combo dose  Oral Daily   memantine 5 mg Oral Daily   metFORMIN 1,000 mg Oral BID With Meals   nebivolol 20 mg Oral BID   nystatin 5 mL Swish & Spit 4x Daily   potassium chloride 20 mEq Oral Daily With Dinner   [START ON 8/13/2019] potassium chloride 20 mEq Oral Daily With Breakfast   vitamin D 50,000 Units Oral Q7 Days     Continuous Infusions:   PRN Meds:.•  aluminum-magnesium hydroxide-simethicone  •  dextrose  •  dextrose  •  glucagon (human recombinant)  •  nitroglycerin      Assessment/Plan       Stroke (cerebrum) (CMS/HCC)      Assessment & Plan  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  If add Namenda, will probably discontinue amantadine as she continues alert.  August 11-she has some increased irritability at times.  This may be related to the underlying stroke deficits itself.  She does not appear to have any dysuria, no odor to her urine.  Staff reports that for the most part she is eating okay.  Will check a follow-up CT of the head to assess for any interval visual changes.  She is on aspirin and Eliquis for stroke prophylaxis.  She had noticeably improved alertness when amantadine was started.  She is readily alert now.  This may be related to natural recovery in terms of her level of arousal at this point.  Current plan is to discontinue the amantadine to see if that helps with her irritability and start  on Namenda for aphasia.  August 12 -  Patient with increased restlessness at times.   Continues aphasia. Not able to identify any complaint.  Appears anxious today. No change on CT head yesterday.    Aphasia -  August 12 - trial of Namenda    Dysphagia-Cortrak feeding tube discontinued on July 31 and  started on puréed/honey thick liquid diet with strategies  August 7-advance to fork mash nectar thick liquid diet, consistent carbohydrate.    History of multiple bilateral strokes and left central retinal artery occlusion    History of left greater than right internal carotid artery stenosis-status post left internal carotid artery stent July 17, 2019    History of hypertension -  Hyzaar 100-25. August 4 - Bystolic increased to 20 mg daily to 20 mg bid.  August 8 - BP still runs high. On discharge in May 2019 was on Hyzaar 100-25 mg daily, Bystolic 20 mg daily, amlodipine 10 mg daily, Hydralazine 50 mg q 8 hours.  Will add Hydralazine initially 10 mg po q 8 hours and titrate up as needed.   August 9-systolic blood pressure elevated last night and early this morning but better this afternoon at 122-130.  Continue to follow recent addition of hydralazine and titrate up as needed  August 10-systolic blood pressure 175 this afternoon, range otherwise 122-142.  Will titrate up on hydralazine to 20 mg every 8 hours.  August 11-hypertension overall appears improved.  Will titrate up further to 25 mg every 8 hours.  August 12 - add amlodipine 5 mg daily.     History of diabetes mellitus -Lantus/glipizide (home medication metformin 1000 mg twice daily and glipizide 10 mg twice daily)  August 1-blood sugars were low yesterday afternoon after tube feeds discontinued.  Held glipizide last night and this morning and Lantus last night.  Blood sugar elevated at noontime today.  Will receive resume glipizide at a lower dose of 5 mg twice a day with meals.  Continue sliding scale insulin coverage.  She also is on metformin at home.  August 5-add back metformin initially at 500 mg twice a day and continue glipizide 5 mg twice a day, both one half of previous home dose, titrate up as needed.  August 7-titrate up metformin to 850 mg twice a day.  Add consistent carbohydrate restriction to diet.  August 9-increase metformin to 1000 g  twice a day.  August 10-blood glucose 340 last evening but 150-114-178 so far today  August 11-continue to follow blood sugar pattern and may increase glipizide further as current dose glipizide 5 mg twice a day along with metformin 1000 mg twice a day    Stroke prophylaxis-aspirin/Eliquis/atorvastatin    Anemia-August 1-hemoglobin 7.4.  Most recent check on July 23 HGB 9.3.  Will recheck hemoglobin this afternoon.  Hemoccult stool.  Iron studies.  Reticulocyte count.  Recheck CBC in the a.m.  Added Protonix.  Patient is on aspirin and Eliquis.  With recent stroke  will look to transfuse packed red blood cell.  August 2-hemoglobin improved 9.0-1 unit packed red blood cells.  Elevated reticulocyte count in response to anemia.  Nursing describes dark stool.  Patient discussed with gastroenterology.  At this point unable to do endoscopy as on Eliquis and aspirin.  Will treat symptomatically for now with increasing proton pump inhibitor and transfusing as needed.  August 5-anemia improved-hemoglobin 9.8    DVT prophylaxis-SCDs/anticoagulation    Impulsivity-   Nursing to do re-orientation with the patient.  Room close to the nursing station.    Endocrine-vitamin D deficiency-ergocalciferol 50,000 units weekly x8 weeks added. Vitamin B12 level and TSH checked in late May unremarkable     Impaired cognition/impaired language, impaired swallow, impaired activity daily living, impaired mobility     Now admit for comprehensive acute inpatient rehabilitation .  This would be an interdisciplinary program with physical therapy 1 hour,  occupational therapy 1 hour, and speech therapy 1 hour, 5 days a week.  Rehabilitation nursing for carryover, monitoring of cardiopulmonary and neurologic   status, bowel and bladder, and skin  Ongoing physician follow-up.  Weekly team conferences.  Goals are indeterminant.   Rehabilitation prognosis determined.  Medical prognosis determined.  Estimated length of stay is indeterminate.    TEAM  CONF - AUGUST 6- TRANFERS CTG. GAIT UP  FEET CTG-MIN ASSIST. UBD MIN. LBD MOD. BATH MOD. SEVERE APHASIA. INCREASED RESISTANCE TO PARTICIPATE AT TIMES.   PUREE/HTL.  GOOD PO INTAKE. ADJUSTING MEDS FOR DIABETES MELLITUS.  BLADDER CONTINENT/INCONTIENT.   ELOS- 2 WEEKS.       Ajit Howard MD  08/12/19  4:26 PM    Time:

## 2019-08-12 NOTE — PROGRESS NOTES
Inpatient Rehabilitation Functional Measures Assessment and Plan of Care    Plan of Care  Updated Problems/Interventions  Mobility    [OT] Toilet Transfers(Active)  Current Status(08/12/2019): CGA/MIN  Weekly Goal(08/16/2019): CGA  Discharge Goal: CGA    [OT] Tub/Shower Transfers(Active)  Current Status(08/12/2019): MIN/CGA  Weekly Goal(08/16/2019): CGA  Discharge Goal: CGA        Self Care    [OT] Bathing(Active)  Current Status(08/12/2019): MIN/CGA  Weekly Goal(08/16/2019): CGA  Discharge Goal: CGA    [OT] Dressing (Lower)(Active)  Current Status(08/12/2019): CGA/MIN  Weekly Goal(08/16/2019): CGA  Discharge Goal: CGA    [OT] Dressing (Upper)(Active)  Current Status(08/12/2019): MIN with bra fastening  Weekly Goal(08/16/2019): set up  Discharge Goal: set up    [OT] Grooming(Active)  Current Status(08/12/2019): MIN  Weekly Goal(08/16/2019): set up  Discharge Goal: set up    [OT] Toileting(Active)  Current Status(08/12/2019): MOD  Weekly Goal(08/16/2019): MIN/CGA  Discharge Goal: MIN/CGA    Functional Measures  KATI Eating:  Branch  KATI Grooming: Branch  KATI Bathing:  Branch  KATI Upper Body Dressing:  Branch  KATI Lower Body Dressing:  Branch  KATI Toileting:  Branch    KATI Bladder Management  Level of Assistance:  Branch  Frequency/Number of Accidents this Shift:  Branch    KATI Bowel Management  Level of Assistance: Branch  Frequency/Number of Accidents this Shift: Branch    KATI Bed/Chair/Wheelchair Transfer:  Branch  KATI Toilet Transfer:  Branch  KATI Tub/Shower Transfer:  Branch    Previously Documented Mode of Locomotion at Discharge: Field  KATI Expected Mode of Locomotion at Discharge: Branch  KATI Walk/Wheelchair:  Branch  KATI Stairs:  Branch    KATI Comprehension:  Branch  KATI Expression:  Branch  KATI Social Interaction:  Branch  KATI Problem Solving:  Branch  KATI Memory:  Branch    Therapy Mode Minutes  Occupational Therapy: Branch  Physical Therapy: Branch  Speech Language Pathology:  Branch    Signed by: Ingris  Elan, OT

## 2019-08-12 NOTE — THERAPY TREATMENT NOTE
Inpatient Rehabilitation - Speech Language Pathology Treatment Note    [facility]       Patient Name: Darby Workman  : 1945  MRN: [id number]    Today's Date: 2019           Admit Date: 2019      Visit Dx:      No diagnosis found.    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)   • Right-sided headache   • Acute ischemic stroke (CMS/HCC)   • Embolic stroke (CMS/HCC)   • Cerebral infarction due to stenosis of left carotid artery (CMS/HCC)   • Anticoagulated by anticoagulation treatment   • Stroke (cerebrum) (CMS/HCC)          Therapy Treatment    Evaluation/Coping    Evaluation/Treatment Time and Intent  Subjective Information: no complaints (19 1300 : Bruton, Katherine L)  Existing Precautions/Restrictions: fall(swallow, communication) (19 1300 : Bruton, Katherine L)  Document Type: therapy note (daily note) (19 1300 : Bruton, Katherine L)  Mode of Treatment: speech-language pathology (19 1300 : Bruton, Katherine L)  Patient/Family Observations: patient seen at bedside d/t increased agitation when taken to tx office in the morning; tolerated 30 minute session without agitation (19 1300 : Bruton, Katherine L)  Start Time (Evaluation/Treatment): 1300 (19 1300 : Bruton, Katherine L)  Stop Time (Evaluation/Treatment): 1330 (19 1300 : Bruton, Katherine L)    Vitals/Pain/Safety    Pain Scale: FACES Pre/Post-Treatment  Pain: FACES Scale, Pretreatment: 0-->no hurt (19 1300 : Bruton, Katherine L)  Pain: FACES Scale, Post-Treatment: 0-->no hurt (19 1300 : Bruton, Katherine L)    EDUCATION  The patient has been educated in the following areas:   Communication Impairment.    SLP GOALS     Row Name 19 1300 19 0930          Words/Phrases/Sentences Goal 1 (SLP)    Barriers  (Identify Objects and Pictures Goal 1, SLP)  --  poor effort this date, agitated  -KB     Progress (Ability to Contruct Words/Phrases/Sentences Goal 1, SLP)  with minimal cues (75-90%)  -KB  with 1:1 supervision/constant cues  -KB     Progress/Outcomes (Identify Objects and Pictures Goal 1, SLP)  good progress toward goal;goal ongoing  -KB  goal ongoing  -KB     Comment (Words/Phrases/Sentences Goal 1, SLP)  80% indetifying named picture in fo2  -KB  attempted identification of pictured in fo2, patient tapped both pictures with both hands on 3/3 trials despite maximal cues including [place] assist initially  -KB        Word Retrieval Skills Goal 1 (SLP)    Progress (Word Retrieval Skills Goal 1, SLP)  with 1:1 supervision/constant cues  -KB  --     Progress/Outcomes (Word Retrieval Goal 1, SLP)  goal ongoing  -KB  --     Comment (Word Retrieval Goal 1, SLP)  10% naming of pictured objects, 60% with direct verbal/visual model  -KB  --        Planning and Execution of Connected Speech Goal 1 (SLP)    Barriers (Planning and Execution of Connected Speech Goal 1, SLP)  --  poor effort this date, agitated  -KB     Progress (Planning and Execution of Connected Speech Goal 1, SLP)  with maximum cues (25-49%)  -KB  with 1:1 supervision/constant cues  -KB     Progress/Outcomes (Planning and Execution of Connected Speech Goal 1, SLP)  goal ongoing  -KB  goal ongoing  -KB     Comment (Planning and Execution of Connected Speech Goal 1, SLP)  40% imitation of functional CV words  -KB  0% imitating functional CV words despite maximal cues, patient pointed to her face each trial but no verbalizations produced  -KB        Augmentative/Alternative Communication Objectives Goal 1 (SLP    Progress (Augmentative/Alternative Communication Goal 1, SLP)  --  with 1:1 supervision/constant cues  -KB     Progress/Outcomes (Augmentative/Alternative Communication Goal 1, SLP)  --  goal ongoing  -KB     Comment (Augmentative/Alternative  "Communication Goal 1, SLP)  --  Patient perseveratively pointed to \"phone\" icon on basic communication picture board and reached for office phone. She is not able to use the phone appropriately at this time, so I told her I would have nursing staff try to call family after her session. She was not able to be redirected from trying to communicate something using communication picture boards, but she continued pointing around randomly to various items including nurse, blanket, toilet then when ashed if she needed these items would reaspond \"no\".   -KB        Additional Goal 1 (SLP)    Barriers (Additional Goal 1, SLP)  80% matching like pictures from one side of the page to the other in fo5  -KB  --     Progress/Outcomes (Additional Goal 1, SLP)  good progress toward goal;goal ongoing  -KB  --       User Key  (r) = Recorded By, (t) = Taken By, (c) = Cosigned By    Initials Name Provider Type    KB Bruton, Katherine L Speech and Language Pathologist                  Time Calculation:       Time Calculation- SLP     Row Name 08/12/19 1327 08/12/19 1000          Time Calculation- SLP    SLP Start Time  1300  -KB  0930  -KB     SLP Stop Time  1330  -KB  1000  -KB     SLP Time Calculation (min)  30 min  -KB  30 min  -KB     SLP Received On  08/12/19  -KB  --       User Key  (r) = Recorded By, (t) = Taken By, (c) = Cosigned By    Initials Name Provider Type    KB Bruton, Katherine L Speech and Language Pathologist            Therapy Charges for Today     Code Description Service Date Service Provider Modifiers Qty    [id number]  ST TREATMENT SPEECH 4 8/12/2019 Bruton, Katherine L GN 1          Katherine L Bruton  8/12/2019      "

## 2019-08-12 NOTE — PLAN OF CARE
Problem: Skin Injury Risk (Adult)  Goal: Skin Health and Integrity  Outcome: Ongoing (interventions implemented as appropriate)      Problem: Fall Risk (Adult)  Goal: Absence of Fall  Outcome: Ongoing (interventions implemented as appropriate)      Problem: Patient Care Overview  Goal: Plan of Care Review  Outcome: Ongoing (interventions implemented as appropriate)      Problem: Stroke (IRF) (Adult)  Goal: Promote Optimal Functional [place]  Outcome: Ongoing (interventions implemented as appropriate)

## 2019-08-12 NOTE — PLAN OF CARE
Problem: Skin Injury Risk (Adult)  Goal: Skin Health and Integrity  Outcome: Ongoing (interventions implemented as appropriate)   08/12/19 1500   Skin Injury Risk (Adult)   Skin Health and Integrity making progress toward outcome       Problem: Fall Risk (Adult)  Goal: Absence of Fall  Outcome: Ongoing (interventions implemented as appropriate)   08/12/19 1500   Fall Risk (Adult)   Absence of Fall making progress toward outcome  (patient is quick and impulsive, kept at nurses station )       Problem: Patient Care Overview  Goal: Plan of Care Review  Outcome: Ongoing (interventions implemented as appropriate)   08/12/19 1500   Patient Care Overview   IRF Plan of Care Review progress ongoing, continue   Progress, Functional Goals progress more gradual than expected   Coping/Psychosocial   Plan of Care Reviewed With patient   OTHER   Outcome Summary Patient has global aphasia, impulsive and remains at nurses station when not in bed. Red arm band in place, eats in dining room and requires a lot of supervision. She is continent of b/b, takes medication crushed in pudding, and is an accucheck- coverage needed at lunch. Family conference Thurs 8/15 at 11am, no other issues at this time.        Problem: Stroke (IRF) (Adult)  Goal: Promote Optimal Functional [place]  Outcome: Ongoing (interventions implemented as appropriate)   08/12/19 1500   Stroke (IRF) (Adult)   Promote Optimal Functional [place] demonstrating adequate progress

## 2019-08-12 NOTE — THERAPY TREATMENT NOTE
Inpatient Rehabilitation - Physical Therapy Treatment Note  [facility]     Patient Name: Darby Workman  : 1945  MRN: [id number]    Today's Date: 2019                 Admit Date: 2019      Visit Dx:    No diagnosis found.    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)   • Right-sided headache   • Acute ischemic stroke (CMS/HCC)   • Embolic stroke (CMS/HCC)   • Cerebral infarction due to stenosis of left carotid artery (CMS/HCC)   • Anticoagulated by anticoagulation treatment   • Stroke (cerebrum) (CMS/HCC)       Therapy Treatment    IRF Treatment Summary     Row Name 19 1300 19 1039 19 0930       Evaluation/Treatment Time and Intent    Subjective Information  no complaints  -KB  no complaints  -MD  no complaints  -KB    Existing Precautions/Restrictions  fall swallow, communication  -KB  fall  -MD  fall swallow, communication  -KB    Document Type  therapy note (daily note)  -KB  therapy note (daily note)  -MD  therapy note (daily note)  -KB    Mode of Treatment  speech-language pathology  -KB  physical therapy  -MD  speech-language pathology  -KB    Patient/Family Observations  patient seen at bedside d/t increased agitation when taken to tx office in the morning; tolerated 30 minute session without agitation  -KB  Pt seated in w/c at nsg station.  Pt more aggitated this am, she seems frustrated about her speech.  -MD  assisted pt from bed to wc; patient was agitated throughout session, not able to maintain calm/focus for structured tasks   -KB    Start Time (Evaluation/Treatment)  1300  -KB  --  0930  -KB    Stop Time (Evaluation/Treatment)  1330  -KB  --  1000  -KB    Recorded by [KB] Bruton, Katherine L [MD] Mira Chadwick, PT [KB] Bruton, Katherine L    Row Name 19 1035              Cognition/Psychosocial- PT/OT    Orientation Status (Cognition)  unable/difficult to assess  -MD      Follows Commands (Cognition)  follows one step commands;25-49% accuracy;physical/tactile prompts required;repetition of directions required;verbal cues/prompting required  -MD      Personal Safety Interventions  fall prevention program maintained;gait belt  -MD      Recorded by [MD] Mira Chadwick, PT      Row Name 08/12/19 1039             Bed Mobility Assessment/Treatment    Supine-Sit [place] (Bed Mobility)  supervision  -MD      Recorded by [MD] Mira Chadwick, PT      Row Name 08/12/19 1039             Bed-Chair Transfer    Bed-Chair [place] (Transfers)  contact guard  -MD      Recorded by [MD] Mira Chadwick, PT      Row Name 08/12/19 1039             Sit-Stand Transfer    Sit-Stand [place] (Transfers)  contact guard  -MD      Recorded by [MD] Mira Chadwick, PT      Row Name 08/12/19 1039             Stand-Sit Transfer    Stand-Sit [place] (Transfers)  contact guard  -MD      Recorded by [MD] Mira Chadwick, PT      Row Name 08/12/19 1039             Gait/Stairs Assessment/Training    [place] Level (Gait)  contact guard;stand by assist  -MD      Distance in Feet (Gait)  200x2  -MD      Pattern (Gait)  step-through  -MD      Deviations/Abnormal Patterns (Gait)  festinating/shuffling;eliza decreased  -MD      Bilateral Gait Deviations  forward flexed posture;heel strike decreased  -MD      [place] Level (Stairs)  verbal cues;contact guard  -MD      Handrail Location (Stairs)  both sides  -MD      Number of Steps (Stairs)  4  -MD      Ascending Technique (Stairs)  step-over-step  -MD      Descending Technique (Stairs)  step-to-step  -MD      Recorded by [MD] Mira Chadwick, PT      Row Name 08/12/19 1300 08/12/19 1039 08/12/19 0930       Pain Scale: FACES Pre/Post-Treatment    Pain: FACES Scale, Pretreatment  0-->no hurt  -KB  0-->no hurt  -MD  0-->no hurt  -KB    Pain: FACES Scale, Post-Treatment   0-->no hurt  -KB  --  0-->no hurt  -KB    Recorded by [KB] Bruton, Katherine L [MD] Mira Chadwick, PT [KB] Bruton, Katherine L    Row Name 08/12/19 1039             Standing Balance Activity    Activities Performed (Standing, Balance Training)  standing ball toss  -MD      Support Needed for Balance (Standing, Balance Training)  minimal external support for balance, 75% patient effort  -MD      Progressive Balance Activity (Standing, Balance Training)  -- standing on foam square  -MD      Comment (Standing, Balance Training)  niraj balloon  -MD      Recorded by [MD] Mira Chadwick, PT      Row Name 08/12/19 1039             Dynamic Balance Activity    Therapeutic Training Performed (Dynamic Balance)  side stepping  -MD      Support Needed for Balance (Dynamic Balance Training)  CGA  -MD      Upper Extremity Activity with Device (Dynamic Balance Training)  other (see comments) neftali bars  -MD      Recorded by [MD] Mira Chadwick, PT      Row Name 08/12/19 1039             Lower Extremity Standing Therapeutic Exercise    Performed, Standing Lower Extremity (Therapeutic Exercise)  hip flexion/extension;heel raises  -MD      Device, Standing Lower Extremity (Therapeutic Exercise)  neftali bars  -MD      Sets/Reps Detail, Standing Lower Extremity (Therapeutic Exercise)  1/10  -MD      Recorded by [MD] Mira Chadwick, PT      Row Name 08/12/19 1039             Lower Extremity Seated Therapeutic Exercise    Performed, Seated Lower Extremity (Therapeutic Exercise)  hip flexion/extension;knee flexion/extension;LAQ (long arc quad), knee extension;ankle dorsiflexion/plantarflexion  -MD      Exercise Type, Seated Lower Extremity (Therapeutic Exercise)  AROM (active range of motion)  -MD      Sets/Reps Detail, Seated Lower Extremity (Therapeutic Exercise)  1/20  -MD      Recorded by [MD] Mira Chadwick, PT      Row Name 08/12/19 1039             Positioning and Restraints    Pre-Treatment Position  sitting in chair/recliner  -MD      Post Treatment  Position  wheelchair  -MD      In Wheelchair  with OT  -MD      Recorded by [MD] Mira Chadwick, PT        User Key  (r) = Recorded By, (t) = Taken By, (c) = Cosigned By    Initials Name Effective Dates    Mira Vaca, PT 04/03/18 -     KB Bruton, Katherine L 03/07/18 -         Wound 07/17/19 1015 Left neck incision (Active)   Dressing Appearance open to air 8/11/2019  8:00 PM   Closure Liquid skin adhesive 8/11/2019  8:00 PM   Base clean;dry 8/11/2019  8:00 PM   Drainage Amount none 8/12/2019  8:00 AM   Dressing Care, Wound open to air 8/12/2019  8:00 AM     Physical Therapy Education     Title: PT OT SLP Therapies (Not Started)     Topic: Physical Therapy (In Progress)     Point: Mobility training (In Progress)     Learning Progress Summary           Patient Nonacceptance, E,TB,D, NR by ENID at 8/5/2019 12:00 PM    Acceptance, E,D, NR by NESTOR at 8/3/2019  1:35 PM    Acceptance, D, DU,NR by NESTOR at 8/3/2019  8:56 AM                   Point: Home exercise program (In Progress)     Learning Progress Summary           Patient Nonacceptance, E,TB,D, NR by ENID at 8/5/2019 12:00 PM                   Point: Precautions (In Progress)     Learning Progress Summary           Patient Acceptance, E, NR by MD at 8/12/2019 10:45 AM    Acceptance, E, NR by MD at 8/10/2019 11:15 AM    Acceptance, E, NR by MD at 8/9/2019 11:12 AM    Acceptance, E, NR by MD at 8/8/2019 11:48 AM    Acceptance, E, NR by MD at 8/6/2019 10:41 AM    Acceptance, E, NR by MD at 8/2/2019 10:44 AM    Acceptance, E,D, NR by MD at 8/1/2019 12:16 PM                               User Key     Initials Effective Dates Name Provider Type Discipline    NESTOR 04/03/18 -  Nicole Austin, PT Physical Therapist PT    MD 04/03/18 -  Mira Chadwick, PT Physical Therapist PT    ENID 04/03/18 -  Marycruz Schmitt, PT Physical Therapist PT                  PT Recommendation and Plan  Anticipated Equipment Needs at Discharge (PT Eval): front wheeled walker  Frequency of Treatment (PT Eval): 5  times per week, 60 minutes per session                     Time Calculation:     PT Charges     Row Name 08/12/19 1417 08/12/19 1038          Time Calculation    Start Time  1400  -MD  1030  -MD     Stop Time  1430  -MD  1100  -MD     Time Calculation (min)  30 min  -MD  30 min  -MD     PT Received On  --  08/12/19  -MD     PT - Next Appointment  --  08/13/19  -MD       User Key  (r) = Recorded By, (t) = Taken By, (c) = Cosigned By    Initials Name Provider Type    Mira Vaca, PT Physical Therapist          Therapy Charges for Today     Code Description Service Date Service Provider Modifiers Qty    70154880045 HC PT THER PROC EA 15 MIN 8/12/2019 Mira Chadwick, PT GP 4    52100470384 HC PT THER SUPP EA 15 MIN 8/12/2019 Mira Chadwick, PT GP 1                   Mira Chadwick PT  8/12/2019

## 2019-08-12 NOTE — PROGRESS NOTES
Inpatient Rehabilitation Functional Measures Assessment    Functional Measures  KATI Eating:  [facility] Grooming: [facility] Bathing:  [facility] Upper Body Dressing:  [facility] Lower Body Dressing:  [facility] Toileting:  [facility] Bladder Management  Level of Assistance:  [place]  Frequency/Number of Accidents this Shift:  [facility] Bowel Management  Level of Assistance: [place]  Frequency/Number of Accidents this Shift: [facility] Bed/Chair/Wheelchair Transfer:  [facility] Toilet Transfer:  [facility] Tub/Shower Transfer:  [place]    Previously Documented Mode of Locomotion at Discharge: Field  KATI Expected Mode of Locomotion at Discharge: [facility] Walk/Wheelchair:  [facility] Stairs:  [facility] Comprehension:  Auditory comprehension is the usual mode. Patient does not  comprehend complex/abstract information in their primary language without  assistance from a helper. Comprehension Score = 2, Maximal Prompting. Patient  comprehends basic daily needs 25-49% of the time. Patient understands simple  information via single words or gestures. Requires maximal/a lot of prompting  (most of the time). No assistive devices were required.  KATI Expression:  Vocal expression is the usual mode. Patient does not express  complex/abstract information in their primary language without a helper.  Expression Score = 1, Total Assistance. Patient expresses basic daily needs less  than 25% of the time, or does not express basic needs appropriately or  consistently despite prompting. No assistive devices were required.  KATI Social Interaction:  Activity was not observed.  KATI Problem Solving:  Activity was not observed.  KATI Memory:  Activity was not observed.    Therapy Mode Minutes  Occupational Therapy: [place]  Physical Therapy: [place]  Speech Language Pathology:  [place]    Signed by: Eryn Yan RN

## 2019-08-12 NOTE — PROGRESS NOTES
Adult Nutrition  Assessment/PES    Patient Name:  Darby Workman  YOB: 1945  MRN: [id number]  Admit Date:  7/31/2019    Assessment Date:  8/12/2019      Reason for Assessment     Row Name 08/12/19 1417          Reason for Assessment    Reason For Assessment  follow-up protocol         Nutrition/Diet History     Row Name 08/12/19 1417          Nutrition/Diet History    Typical Food/Fluid Intake  Usually eats adequately at meals (%) but refused her lunch today. She was in DR sitting with other ladies.            Labs/Tests/Procedures/Meds     Row Name 08/12/19 1418          Labs/Procedures/Meds    Lab Results Reviewed  reviewed     Lab Results Comments  glu [id number]        Diagnostic Tests/Procedures    Diagnostic Test/Procedure Reviewed  reviewed        Medications    Pertinent Medications Reviewed  reviewed         Physical Findings     Row Name 08/12/19 1419          Physical Findings    Overall Physical Appearance  other (see comments) aphasic     Tubes  -- feeding tube pulled last night     Skin  other (see comments) L neck incision           Nutrition Prescription Ordered     Row Name 08/12/19 1419          Nutrition Prescription PO    Current PO Diet  Dysphagia     Dysphagia Level  2  Pureed     Fluid Consistency  Honey thick     Supplement  Magic Cup;Other (comment) shakes     Supplement Frequency  3 times a day     Common Modifiers  Consistent Carbohydrate         Evaluation of Received Nutrient/Fluid Intake     Row Name 08/12/19 1419          PO Evaluation    % PO Intake  % most meals (refused lunch today?)               Problem/Interventions:            Intervention Goal     Row Name 08/12/19 1421          Intervention Goal    General  Maintain nutrition;Disease management/therapy     PO  Tolerate PO;Advance diet;PO intake (%)     PO Intake %  75 %     Weight  Maintain weight         Nutrition Intervention     Row Name 08/12/19 1421          Nutrition Intervention    RD/Tech  Action  Encourage intake;Follow Tx progress;Care plan reviewd           Education/Evaluation     Row Name 08/12/19 1421          Monitor/Evaluation    Monitor  Per protocol           Electronically signed by:  Renate Clifford RD  08/12/19 2:22 PM

## 2019-08-12 NOTE — PROGRESS NOTES
Inpatient Rehabilitation Plan of Care Note    Plan of Care  Care Plan Reviewed - No updates at this time.    Psychosocial    Performed Intervention(s)  Assist patient to express needs and concerns      Safety    Performed Intervention(s)  Bed alarm, wc alarm  Safety rounds  Items within reach      Body Systems    Performed Intervention(s)  Daily skin inspection      Sphincter Control    Performed Intervention(s)  Monitor intake and output  Encourage fluid intake  Elimination schedule    Signed by: Eryn Yan RN

## 2019-08-12 NOTE — THERAPY TREATMENT NOTE
Inpatient Rehabilitation - Occupational Therapy Treatment Note    [facility]     Patient Name: Darby Workman  : 1945  MRN: [id number]    Today's Date: 2019                 Admit Date: 2019      Visit Dx:  No diagnosis found.    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)   • Right-sided headache   • Acute ischemic stroke (CMS/HCC)   • Embolic stroke (CMS/HCC)   • Cerebral infarction due to stenosis of left carotid artery (CMS/HCC)   • Anticoagulated by anticoagulation treatment   • Stroke (cerebrum) (CMS/HCC)         Therapy Treatment    IRF Treatment Summary     Row Name 19 1513 19 1300 19 1039       Evaluation/Treatment Time and Intent    Subjective Information  -- agitated during PM session  -AF  no complaints  -KB  no complaints  -MD    Existing Precautions/Restrictions  fall aphasia  -AF  fall swallow, communication  -KB  fall  -MD    Document Type  therapy note (daily note)  -AF  therapy note (daily note)  -KB  therapy note (daily note)  -MD    Mode of Treatment  occupational therapy  -AF  speech-language pathology  -KB  physical therapy  -MD    Patient/Family Observations  in AM sitting up in w/c in dining room, in PM sitting up in w/c after PT in therapy gym, PM session increased agitation unable to use picutres, points to phone, called daughter for patient, points to hungry and short of breath, NSG aware. unable to encourage or distract pt this afternoon kept walking back to room to lay down in bed  -AF  patient seen at bedside d/t increased agitation when taken to tx office in the morning; tolerated 30 minute session without agitation  -KB  Pt seated in w/c at nsg station.  Pt more aggitated this am, she seems frustrated about her speech.  -MD    Start Time (Evaluation/Treatment)  --  1300   -KB  --    Stop Time (Evaluation/Treatment)  --  1330  -KB  --    Recorded by [AF] Ingris Ansari OTR [KB] Bruton, Katherine L [MD] Mira Chadwick, PT    Row Name 08/12/19 0930             Evaluation/Treatment Time and Intent    Subjective Information  no complaints  -KB      Existing Precautions/Restrictions  fall swallow, communication  -KB      Document Type  therapy note (daily note)  -KB      Mode of Treatment  speech-language pathology  -KB      Patient/Family Observations  assisted pt from bed to wc; patient was agitated throughout session, not able to maintain calm/focus for structured tasks   -KB      Start Time (Evaluation/Treatment)  0930  -KB      Stop Time (Evaluation/Treatment)  1000  -KB      Recorded by [KB] Bruton, Katherine L      Row Name 08/12/19 1513 08/12/19 1039          Cognition/Psychosocial- PT/OT    Affect/Mental Status (Cognitive)  unable/difficult to assess;anxious;agitated  -AF  --     Orientation Status (Cognition)  unable/difficult to assess  -AF  unable/difficult to assess  -MD     Follows Commands (Cognition)  0-24% accuracy;follows one step commands difficult to redirect in PM session  -AF  follows one step commands;25-49% accuracy;physical/tactile prompts required;repetition of directions required;verbal cues/prompting required  -MD     Personal Safety Interventions  fall prevention program maintained;gait belt;nonskid shoes/slippers when out of bed  -AF  fall prevention program maintained;gait belt  -MD     Attention Deficit (Cognitive)  severe deficit;moderate deficit  -AF  --     Memory Deficit (Cognitive)  unable/difficult to assess  -AF  --     Safety Deficit (Cognitive)  severe deficit;awareness of need for assistance;impulsivity;insight into deficits/self awareness  -AF  --     Recorded by [AF] Ingris Ansari OTR [MD] Mira Chadwick, PT     Row Name 08/12/19 1513 08/12/19 1039          Bed Mobility Assessment/Treatment    Supine-Sit [place] (Bed Mobility)  --   supervision  -MD     Sit-Supine [place] (Bed Mobility)  supervision  -AF  --     Recorded by [AF] Ingris Ansari OTR [MD] Mira Chadwick, PT     Row Name 08/12/19 1513             Functional Mobility    Functional Mobility- Comment  walked in therapy gym, hallway, room, dining room with CGA/SBA decreased awareness on the R side, would bump into doorways   -AF      Recorded by [AF] Ingris Ansari OTR      Row Name 08/12/19 1513 08/12/19 1039          Bed-Chair Transfer    Bed-Chair [place] (Transfers)  contact guard  -AF  contact guard  -MD     Recorded by [AF] Ingris Ansari OTR [MD] Mira Chadwick, PT     Row Name 08/12/19 1513             Chair-Bed Transfer    Chair-Bed [place] (Transfers)  contact guard  -AF      Recorded by [AF] Ingris Ansari OTR      Row Name 08/12/19 1513 08/12/19 1039          Sit-Stand Transfer    Sit-Stand [place] (Transfers)  contact guard  -AF  contact guard  -MD     Recorded by [AF] Ingris Ansari OTR [MD] Mira Chadwick, PT     Row Name 08/12/19 1513 08/12/19 1039          Stand-Sit Transfer    Stand-Sit [place] (Transfers)  contact guard  -AF  contact guard  -MD     Recorded by [AF] Ingris Ansari OTR [MD] Mira Chadwick, PT     Row Name 08/12/19 1513             Shower Transfer    Type (Shower Transfer)  stand pivot/stand step  -AF      [place] Level (Shower Transfer)  contact guard;verbal cues  -AF      Assistive Device (Shower Transfer)  grab bars/tub rail;tub bench;wheelchair  -AF      Recorded by [AF] Ingris Ansari OTR      Row Name 08/12/19 1039             Gait/Stairs Assessment/Training    [place] Level (Gait)  contact guard;stand by assist  -MD      Distance in Feet (Gait)  200x2  -MD      Pattern (Gait)  step-through  -MD      Deviations/Abnormal Patterns (Gait)  festinating/shuffling;eliza decreased  -MD      Bilateral Gait Deviations  forward flexed posture;heel strike decreased  -MD      [place] Level (Stairs)  verbal  cues;contact guard  -MD      Handrail Location (Stairs)  both sides  -MD      Number of Steps (Stairs)  4  -MD      Ascending Technique (Stairs)  step-over-step  -MD      Descending Technique (Stairs)  step-to-step  -MD      Recorded by [MD] Mira Chadwick PT      Row Name 08/12/19 1513             Basic Activities of Daily Living (BADLs)    Basic Activities of Daily Living  self-feeding  -AF      Recorded by [AF] Ingris Ansari, OTR      Row Name 08/12/19 1513             Bathing Assessment/Treatment    Bathing [place] Level  bathing skills  -AF      Assistive Device (Bathing)  grab bar/tub rail;hand held shower spray hose;tub bench  -AF      Bathing Position  unsupported sitting;supported standing  -AF      Comment (Bathing)  vc's for throughness  -AF      Recorded by [AF] Ingris Ansari, OTR      Row Name 08/12/19 1513             Upper Body Dressing Assessment/Treatment    Upper Body Dressing Task  minimum assist (75% or more patient effort);verbal cues  -AF      Upper Body Dressing Position  supported sitting  -AF      Comment (Upper Body Dressing)  MIN A with fasten bra  -AF      Recorded by [AF] Ingris Ansari OTR      Row Name 08/12/19 1513             Lower Body Dressing Assessment/Treatment    Lower Body Dressing [place] Level  doff;don;shoes/slippers;pants/bottoms;socks;minimum assist (75% patient effort);contact guard assist;verbal cues  -AF      Lower Body Dressing Position  supported sitting;supported standing  -AF      Recorded by [AF] Ingris Ansari OTR      Row Name 08/12/19 1513             Grooming Assessment/Treatment    Grooming [place] Level  hair care, combing/brushing;minimum assist (75% patient effort)  -AF      Grooming Position  supported sitting  -AF      Recorded by [AF] Ingris Ansari OTR      Row Name 08/12/19 1513             Self-Feeding Assessment/Treatment    [place] Level (Self-Feeding)  feeding skills;minimum assist (75% patient effort)  -AF       Position (Self-Feeding)  supported sitting  -AF      Comment (Self-Feeding)  difficulty with using spoon to scoop ice cream  -AF      Recorded by [AF] Ingris Ansari OTR      Row Name 08/12/19 1513             Pain Scale: Numbers Pre/Post-Treatment    Pain Scale: Numbers, Pretreatment  0/10 - no pain  -AF      Pain Scale: Numbers, Post-Treatment  0/10 - no pain  -AF      Recorded by [AF] Ingris Ansari OTR      Row Name 08/12/19 1300 08/12/19 1039 08/12/19 0930       Pain Scale: FACES Pre/Post-Treatment    Pain: FACES Scale, Pretreatment  0-->no hurt  -KB  0-->no hurt  -MD  0-->no hurt  -KB    Pain: FACES Scale, Post-Treatment  0-->no hurt  -KB  --  0-->no hurt  -KB    Recorded by [KB] Bruton, Katherine L [MD] Mira Chadwick, PT [KB] Bruton, Katherine L    Row Name 08/12/19 1039             Standing Balance Activity    Activities Performed (Standing, Balance Training)  standing ball toss  -MD      Support Needed for Balance (Standing, Balance Training)  minimal external support for balance, 75% patient effort  -MD      Progressive Balance Activity (Standing, Balance Training)  -- standing on foam square  -MD      Comment (Standing, Balance Training)  batting balloon  -MD      Recorded by [MD] Mira Chadwick, PT      Row Name 08/12/19 1039             Dynamic Balance Activity    Therapeutic Training Performed (Dynamic Balance)  side stepping  -MD      Support Needed for Balance (Dynamic Balance Training)  CGA  -MD      Upper Extremity Activity with Device (Dynamic Balance Training)  other (see comments) neftali bars  -MD      Recorded by [MD] Mira Chadwick, PT      Row Name 08/12/19 1039             Lower Extremity Standing Therapeutic Exercise    Performed, Standing Lower Extremity (Therapeutic Exercise)  hip flexion/extension;heel raises  -MD      Device, Standing Lower Extremity (Therapeutic Exercise)  neftali bars  -MD      Sets/Reps Detail, Standing Lower Extremity (Therapeutic Exercise)  1/10  -MD      Recorded by  [MD] Mira Chadwick, PT      Row Name 08/12/19 1039             Lower Extremity Seated Therapeutic Exercise    Performed, Seated Lower Extremity (Therapeutic Exercise)  hip flexion/extension;knee flexion/extension;LAQ (long arc quad), knee extension;ankle dorsiflexion/plantarflexion  -MD      Exercise Type, Seated Lower Extremity (Therapeutic Exercise)  AROM (active range of motion)  -MD      Sets/Reps Detail, Seated Lower Extremity (Therapeutic Exercise)  1/20  -MD      Recorded by [MD] Mira Chadwick, PT      Row Name 08/12/19 1513 08/12/19 1039          Positioning and Restraints    Pre-Treatment Position  sitting in chair/recliner  -AF  sitting in chair/recliner  -MD     Post Treatment Position  bed  -AF  wheelchair  -MD     In Bed  supine;notified nsg;call light within reach;encouraged to call for assist;exit alarm on in PM on phone with daughter, in AM in bed  -AF  --     In Wheelchair  --  with OT  -MD     Recorded by [AF] Ingris Ansari OTR [MD] Mira Chadwick, PT       User Key  (r) = Recorded By, (t) = Taken By, (c) = Cosigned By    Initials Name Effective Dates    AF Ingris Ansari OTR 04/03/18 -     Mira Vaca, PT 04/03/18 -     KB Bruton, Katherine L 03/07/18 -           Wound 07/17/19 1015 Left neck incision (Active)   Dressing Appearance open to air 8/11/2019  8:00 PM   Closure Liquid skin adhesive 8/11/2019  8:00 PM   Base clean;dry 8/11/2019  8:00 PM   Drainage Amount none 8/12/2019  8:00 AM   Dressing Care, Wound open to air 8/12/2019  8:00 AM         OT Recommendation and Plan    Anticipated Equipment Needs At Discharge (OT Eval): bathing equipment  Planned Therapy Interventions (OT Eval): activity tolerance training, BADL retraining, cognitive/visual perception retraining, functional balance retraining, neuromuscular control/coordination retraining, occupation/activity based interventions, patient/caregiver education/training, ROM/therapeutic exercise, strengthening exercise, transfer/mobility  retraining            OT IRF GOALS     Row Name 08/01/19 1159             Transfer Goal 1 (OT-IRF)    Activity/Assistive Device (Transfer Goal 1, OT-IRF)  toilet;shower chair;walk-in shower  -AF      [place] Level (Transfer Goal 1, OT-IRF)  verbal cues required;minimum assist (75% or more patient effort);contact guard assist  -AF      Time Frame (Transfer Goal 1, OT-IRF)  short term goal (STG)  -AF      Progress/Outcomes (Transfer Goal 1, OT-IRF)  goal ongoing  -AF         Transfer Goal 2 (OT-IRF)    Activity/Assistive Device (Transfer Goal 2, OT-IRF)  shower chair;walk-in shower;toilet  -AF      [place] Level (Transfer Goal 2, OT-IRF)  contact guard assist;verbal cues required  -AF      Time Frame (Transfer Goal 2, OT-IRF)  long term goal (LTG)  -AF      Progress/Outcomes (Transfer Goal 2, OT-IRF)  goal ongoing  -AF         Bathing Goal 1 (OT-IRF)    Activity/Device (Bathing Goal 1, OT-IRF)  bathing skills, all;grab bar/tub rail;hand-held shower spray hose;shower chair  -AF      [place] Level (Bathing Goal 1, OT-IRF)  minimum assist (75% or more patient effort);verbal cues required  -AF      Time Frame (Bathing Goal 1, OT-IRF)  short term goal (STG)  -AF      Progress/Outcomes (Bathing Goal 1, OT-IRF)  goal ongoing  -AF         Bathing Goal 2 (OT-IRF)    Activity/Device (Bathing Goal 2, OT-IRF)  bathing skills, all;grab bar/tub rail;hand-held shower spray hose;shower chair  -AF      [place] Level (Bathing Goal 2, OT-IRF)  contact guard assist;verbal cues required  -AF      Time Frame (Bathing Goal 2, OT-IRF)  long term goal (LTG)  -AF      Progress/Outcomes (Bathing Goal 2, OT-IRF)  goal ongoing  -AF         UB Dressing Goal 1 (OT-IRF)    Activity/Device (UB Dressing Goal 1, OT-IRF)  upper body dressing  -AF      [place] (UB Dress Goal 1, OT-IRF)  set-up required  -AF      Time Frame (UB Dressing Goal 1, OT-IRF)  long term goal (LTG)  -AF      Progress/Outcomes (UB Dressing Goal 1, OT-IRF)   goal ongoing  -AF         LB Dressing Goal 1 (OT-IRF)    Activity/Device (LB Dressing Goal 1, OT-IRF)  lower body dressing  -AF      [place] (LB Dressing Goal 1, OT-IRF)  moderate assist (50-74% patient effort);verbal cues required  -AF      Time Frame (LB Dressing Goal 1, OT-IRF)  short term goal (STG)  -AF      Progress/Outcomes (LB Dressing Goal 1, OT-IRF)  goal ongoing  -AF         LB Dressing Goal 2 (OT-IRF)    Activity/Device (LB Dressing Goal 2, OT-IRF)  lower body dressing  -AF      [place] (LB Dressing Goal 2, OT-IRF)  contact guard assist;verbal cues required  -AF      Time Frame (LB Dressing Goal 2, OT-IRF)  long term goal (LTG)  -AF      Progress/Outcomes (LB Dressing Goal 2, OT-IRF)  goal ongoing  -AF         Grooming Goal 1 (OT-IRF)    Activity/Device (Grooming Goal 1, OT-IRF)  grooming skills, all  -AF      [place] (Grooming Goal 1, OT-IRF)  minimum assist (75% or more patient effort);verbal cues required  -AF      Time Frame (Grooming Goal 1, OT-IRF)  short term goal (STG)  -AF      Progress/Outcomes (Grooming Goal 1, OT-IRF)  goal ongoing  -AF         Grooming Goal 2 (OT-IRF)    Activity/Device (Grooming Goal 2, OT-IRF)  grooming skills, all  -AF      [place] (Grooming Goal 2, OT-IRF)  supervision required;verbal cues required  -AF      Time Frame (Grooming Goal 2, OT-IRF)  long term goal (LTG)  -AF      Progress/Outcomes (Grooming Goal 2, OT-IRF)  goal ongoing  -AF         Toileting Goal 1 (OT-IRF)    Activity/Device (Toileting Goal 1, OT-IRF)  toileting skills, all;grab bar/safety frame;raised toilet seat  -AF      [place] Level (Toileting Goal 1, OT-IRF)  moderate assist (50-74% patient effort);verbal cues required  -AF      Time Frame (Toileting Goal 1, OT-IRF)  short term goal (STG)  -AF      Progress/Outcomes (Toileting Goal 1, OT-IRF)  goal ongoing  -AF         Toileting Goal 2 (OT-IRF)    Activity/Device (Toileting Goal 2, OT-IRF)  toileting skills, all;grab  bar/safety frame;raised toilet seat  -AF      [place] Level (Toileting Goal 2, OT-IRF)  verbal cues required;contact guard assist  -AF      Time Frame (Toileting Goal 2, OT-IRF)  long term goal (LTG)  -AF      Progress/Outcomes (Toileting Goal 2, OT-IRF)  goal ongoing  -AF         Caregiver Training Goal 1 (OT-IRF)    Caregiver Training Goal 1 (OT-IRF)  Family and patient to demo safe technique with ADLs, transfers, HEP and adaptive equipment as needed   -AF      Time Frame (Caregiver Training Goal 1, OT-IRF)  long term goal (LTG)  -AF      Progress/Outcomes (Caregiver Training Goal 1, OT-IRF)  goal ongoing  -AF        User Key  (r) = Recorded By, (t) = Taken By, (c) = Cosigned By    Initials Name Provider Type    Ingris Youssef OTR Occupational Therapist                     Time Calculation:     Time Calculation- OT     Row Name 08/12/19 1522 08/12/19 1521          Time Calculation- OT    OT Start Time  1430  -AF  0800  -AF     OT Stop Time  1500  -AF  0830  -AF     OT Time Calculation (min)  30 min  -AF  30 min  -AF       User Key  (r) = Recorded By, (t) = Taken By, (c) = Cosigned By    Initials Name Provider Type    Ingris Youssef OTR Occupational Therapist          Therapy Charges for Today     Code Description Service Date Service Provider Modifiers Qty    [id number]  OT SELF CARE/MGMT/TRAIN EA 15 MIN 8/12/2019 Ingris Ansari OTR GO 3    [id number]  OT THERAPEUTIC ACT EA 15 MIN 8/12/2019 Ingris Ansari OTR GO 1                   JOHN Mejia  8/12/2019

## 2019-08-12 NOTE — PROGRESS NOTES
Inpatient Rehabilitation Functional Measures Assessment    Functional Measures  KATI Eating:  [facility] Grooming: [facility] Bathing:  [facility] Upper Body Dressing:  [facility] Lower Body Dressing:  [facility] Toileting:  [facility] Bladder Management  Level of Assistance:  [place]  Frequency/Number of Accidents this Shift:  [facility] Bowel Management  Level of Assistance: [place]  Frequency/Number of Accidents this Shift: [facility] Bed/Chair/Wheelchair Transfer:  [facility] Toilet Transfer:  [facility] Tub/Shower Transfer:  [place]    Previously Documented Mode of Locomotion at Discharge: Field  KATI Expected Mode of Locomotion at Discharge: [facility] Walk/Wheelchair:  [facility] Stairs:  [facility] Comprehension:  Auditory comprehension is the usual mode. Patient does not  comprehend complex/abstract information in their primary language without  assistance from a helper. Comprehension Score = 2, Maximal Prompting. Patient  comprehends basic daily needs 25-49% of the time. Patient understands simple  information via single words or gestures. Requires maximal/a lot of prompting  (most of the time). No assistive devices were required.  KATI Expression:  Non-vocal expression is the usual mode. Patient does not  express complex/abstract information in their primary language without a helper.  Expression Score = 3, Moderate Prompting. Patient expresses basic daily needs or  ideas 50-74% of the time. Patient requires moderate/some prompting. Patient  requires the following assistive device(s): Communication board/device.  KATI Social Interaction:  Activity was not observed.  KATI Problem Solving:  Activity was not observed.  KATI Memory:  Memory Score = 2, Maximal Prompting. Patient recognizes and  remembers 25-49% of the time. Patient requires maximal/a lot of prompting (most  of the time) for memory for the following:    Therapy Mode Minutes  Occupational Therapy: [place]  Physical Therapy:  Branch  Speech Language Pathology:  Branch    Signed by: Luis Abernathy RN

## 2019-08-12 NOTE — PROGRESS NOTES
Inpatient Rehabilitation Functional Measures Assessment    Functional Measures  KATI Eating:  Branch  KATI Grooming: Branch  KATI Bathing:  Branch  [facility] Upper Body Dressing:  [place]  [facility] Lower Body Dressing:  [place]  [facility] Toileting:  Patient requires moderate assistance for adjusting clothing  before using a toilet, commode, bedpan, or urinal. Patient requires moderate  assistance for hygiene. Patient requires moderate assistance for adjusting  clothing after using a toilet, commode, bedpan, or urinal. Patient performs 0 -  24% of toileting tasks.  Toileting Score = 1, Total Assistance. Patient requires  the following assistive device(s): Grab bar. CGA to the bathroom. .    KATI Bladder Management  Level of Assistance:  Bladder Score = 1. Patient performs less than 25% of tasks  and requires total assistance for bladder management. [place] provides total  assist to completely apply and remove brief.  Frequency/Number of Accidents this Shift:  Bladder accidents this shift:  0 .  Patient has not had an accident, but used a device/medication this shift  requiring: Brief .    KATI Bowel Management  Level of Assistance: Bowel Score = 1. Patient performs less than 25% of tasks  and requires total assistance for bowel management. [place] provides total assist  to completely apply and remove brief.  Frequency/Number of Accidents this Shift: Bowel accidents this shift: 0 .  Patient has not had an accident, but used a device/medication this shift  requiring: Brief .    KATI Bed/Chair/Wheelchair Transfer:  Bed/chair/wheelchair Transfer Score = 4.  Patient performs 75% or more of effort and minimal assistance (little/incidental  help/lifting of one limb/steadying) for transferring to and from the  bed/chair/wheelchair, requiring: Steadying. Contact guard. Patient requires the  following assistive device(s): Bed rails. Grab bars.  KATI Toilet Transfer:  Toilet Transfer Score = 4.  Patient performs 75% or more  of effort and minimal  assistance (little/incidental help/steadying) for  transferring to and from the toilet/commode, requiring: Steadying. Contact  guard. Hand placement. Patient requires the following assistive device(s): Grab  bars.  KATI Tub/Shower Transfer:  Branch    Previously Documented Mode of Locomotion at Discharge: Field  KATI Expected Mode of Locomotion at Discharge: Branch  [facility] Walk/Wheelchair:  Branch  [facility] Stairs:  Branch    [facility] Comprehension:  Branch  [facility] Expression:  Branch  [facility] Social Interaction:  Branch  [facility] Problem Solving:  Branch  [facility] Memory:  Branch    Therapy Mode Minutes  Occupational Therapy: Branch  Physical Therapy: Branch  Speech Language Pathology:  Branch    Signed by: NUNU Ulloa

## 2019-08-12 NOTE — PROGRESS NOTES
Inpatient Rehabilitation Plan of Care Note    Plan of Care  Care Plan Reviewed - No updates at this time.    Signed by: Luis Abernathy RN

## 2019-08-12 NOTE — PROGRESS NOTES
Long discussion with pt's daughter by phone regarding pt's current status and that pt will continue to need  close, 24 hour supervision at the time of discharge. We also dicussed that it is uncertain how long pt will require this level of care.   Discussed options for home care such as hired assistance and use of adult day  programs. Family conference planned for Thursday, 8/15.

## 2019-08-12 NOTE — PROGRESS NOTES
Inpatient Rehabilitation Functional Measures Assessment and Plan of Care    Plan of Care  Updated Problems/Interventions  Mobility    [PT] Bed/Chair/Wheelchair(Active)  Current Status(08/12/2019): CGA  Weekly Goal(08/20/2019): SBA  Discharge Goal: SBA    [PT] Walk(Active)  Current Status(08/12/2019): 220, CGA-SBA  Weekly Goal(08/20/2019): to and from BR, SBA  Discharge Goal: 250` SBA    [PT] Stairs(Active)  Current Status(08/12/2019): 4 steps CGA  Weekly Goal(08/20/2019): PT only  Discharge Goal: 4 steps CGA    Functional Measures  KATI Eating:  [place]  [facility] Grooming: [facility] Bathing:  [facility] Upper Body Dressing:  [facility] Lower Body Dressing:  [facility] Toileting:  [facility] Bladder Management  Level of Assistance:  [place]  Frequency/Number of Accidents this Shift:  [facility] Bowel Management  Level of Assistance: [place]  Frequency/Number of Accidents this Shift: [facility] Bed/Chair/Wheelchair Transfer:  Bed/chair/wheelchair Transfer Score = 4.  Patient performs 75% or more of effort and minimal assistance (little/incidental  help/lifting of one limb/steadying) for transferring to and from the  bed/chair/wheelchair, requiring: Contact guard. No assistive devices were  required.  KATI Toilet Transfer:  [facility] Tub/Shower Transfer:  [place]    Previously Documented Mode of Locomotion at Discharge: [place]  KATI Expected Mode of Locomotion at Discharge: [facility] Walk/Wheelchair:  WHEELCHAIR OBSERVATION   Activity was not observed.    WALK OBSERVATION   Walk Distance Scale = 3.  Distance walked is greater than 150 feet. Walk Score  = 4.  Patient performs 75% or more of effort and requires minimal assistance.  Incidental help/contact guard/steadying was provided. Patient walked a distance  of  200 feet. No assistive devices were required.  KATI Stairs:  Stairs Score = 2.  Incidental assistance with lifting or lowering,  contact guard or steadying was provided. Patient performs 75% or more of  effort  and requires minimal contact assistance. Patient negotiated  4 stairs. Patient  requires the following assistive device(s): Handrail(s).    KATI Comprehension:  [place]  KATI Expression:  [place]  KATI Social Interaction:  [place]  KATI Problem Solving:  [facility] Memory:  [place]    Therapy Mode Minutes  Occupational Therapy: [place]  Physical Therapy: Individual: 60 minutes.  Speech Language Pathology:  [place]    Signed by: Mira Chadwick PT

## 2019-08-13 LAB
GLUCOSE BLDC GLUCOMTR-MCNC: 116 MG/DL (ref 70–130)
GLUCOSE BLDC GLUCOMTR-MCNC: 151 MG/DL (ref 70–130)
GLUCOSE BLDC GLUCOMTR-MCNC: 226 MG/DL (ref 70–130)
GLUCOSE BLDC GLUCOMTR-MCNC: 96 MG/DL (ref 70–130)

## 2019-08-13 PROCEDURE — 97110 THERAPEUTIC EXERCISES: CPT

## 2019-08-13 PROCEDURE — 92507 TX SP LANG VOICE COMM INDIV: CPT

## 2019-08-13 PROCEDURE — 97535 SELF CARE MNGMENT TRAINING: CPT

## 2019-08-13 PROCEDURE — 92526 ORAL FUNCTION THERAPY: CPT

## 2019-08-13 PROCEDURE — 63710000001 INSULIN REGULAR HUMAN PER 5 UNITS: Performed by: PHYSICAL MEDICINE & REHABILITATION

## 2019-08-13 PROCEDURE — 82962 GLUCOSE BLOOD TEST: CPT

## 2019-08-13 RX ADMIN — NYSTATIN 500000 UNITS: 100000 SUSPENSION ORAL at 12:16

## 2019-08-13 RX ADMIN — APIXABAN 5 MG: 5 TABLET, FILM COATED ORAL at 22:15

## 2019-08-13 RX ADMIN — DORZOLAMIDE HYDROCHLORIDE 1 DROP: 20 SOLUTION/ DROPS OPHTHALMIC at 22:13

## 2019-08-13 RX ADMIN — METFORMIN HYDROCHLORIDE 1000 MG: 1000 TABLET ORAL at 09:06

## 2019-08-13 RX ADMIN — NYSTATIN 500000 UNITS: 100000 SUSPENSION ORAL at 09:11

## 2019-08-13 RX ADMIN — GLIPIZIDE 5 MG: 5 TABLET ORAL at 17:55

## 2019-08-13 RX ADMIN — LANSOPRAZOLE 30 MG: KIT at 17:55

## 2019-08-13 RX ADMIN — ATORVASTATIN CALCIUM 80 MG: 80 TABLET, FILM COATED ORAL at 22:16

## 2019-08-13 RX ADMIN — NEBIVOLOL HYDROCHLORIDE 20 MG: 10 TABLET ORAL at 09:11

## 2019-08-13 RX ADMIN — DORZOLAMIDE HYDROCHLORIDE 1 DROP: 20 SOLUTION/ DROPS OPHTHALMIC at 09:05

## 2019-08-13 RX ADMIN — POTASSIUM CHLORIDE 20 MEQ: 1.5 POWDER, FOR SOLUTION ORAL at 09:06

## 2019-08-13 RX ADMIN — INSULIN HUMAN 3 UNITS: 100 INJECTION, SOLUTION PARENTERAL at 09:27

## 2019-08-13 RX ADMIN — BRIMONIDINE TARTRATE 1 DROP: 2 SOLUTION OPHTHALMIC at 09:05

## 2019-08-13 RX ADMIN — LANSOPRAZOLE 30 MG: KIT at 06:42

## 2019-08-13 RX ADMIN — ASPIRIN 325 MG: 325 TABLET ORAL at 09:06

## 2019-08-13 RX ADMIN — APIXABAN 5 MG: 5 TABLET, FILM COATED ORAL at 09:06

## 2019-08-13 RX ADMIN — GLIPIZIDE 5 MG: 5 TABLET ORAL at 06:41

## 2019-08-13 RX ADMIN — HYDRALAZINE HYDROCHLORIDE 25 MG: 25 TABLET, FILM COATED ORAL at 06:41

## 2019-08-13 RX ADMIN — BRIMONIDINE TARTRATE 1 DROP: 2 SOLUTION OPHTHALMIC at 22:13

## 2019-08-13 RX ADMIN — HYDRALAZINE HYDROCHLORIDE 25 MG: 25 TABLET, FILM COATED ORAL at 22:13

## 2019-08-13 RX ADMIN — NYSTATIN 500000 UNITS: 100000 SUSPENSION ORAL at 22:16

## 2019-08-13 RX ADMIN — LOSARTAN POTASSIUM: 50 TABLET, FILM COATED ORAL at 09:11

## 2019-08-13 RX ADMIN — HYDRALAZINE HYDROCHLORIDE 25 MG: 25 TABLET, FILM COATED ORAL at 14:44

## 2019-08-13 RX ADMIN — MEMANTINE HYDROCHLORIDE 5 MG: 5 TABLET, FILM COATED ORAL at 09:06

## 2019-08-13 RX ADMIN — INSULIN HUMAN 5 UNITS: 100 INJECTION, SOLUTION PARENTERAL at 12:16

## 2019-08-13 RX ADMIN — METFORMIN HYDROCHLORIDE 1000 MG: 1000 TABLET ORAL at 17:55

## 2019-08-13 RX ADMIN — NYSTATIN 500000 UNITS: 100000 SUSPENSION ORAL at 17:55

## 2019-08-13 RX ADMIN — AMLODIPINE BESYLATE 5 MG: 5 TABLET ORAL at 22:16

## 2019-08-13 RX ADMIN — NEBIVOLOL HYDROCHLORIDE 20 MG: 10 TABLET ORAL at 22:15

## 2019-08-13 NOTE — PROGRESS NOTES
Case Management  Inpatient Rehabilitation Team Conference    Conference Date/Time: 8/13/2019 7:55:09 AM    Team Conference Attendees:  Dr. Ajit Leonardo, Psy.rod Hurtado, Pharmacist  Sheryl Lacy, ROZW  Mira Chadwick, PT  Ingris Ansari, OT  Katherine Bruton, SLP  Renate Clifford RD, LD  Star Capps, RN  Eryn Yan, RN  Dwain Costa, Chaplain Char Carvajal, CTRS    Demographics            Age: 74Y            Gender: Female    Admission Date: 7/31/2019 3:45:00 PM  Rehabilitation Diagnosis:  CVA right neftali  Past Medical History: Vision loss; carotid stenosis, CAD, MI, blood clots, HLD,  HTN; OA; nocturia; CVA x2 5/19; DM      Plan of Care  Anticipated Discharge Date/Estimated Length of Stay: ELOS: 2 weeks  Anticipated Discharge Destination: Community discharge with assistance  Discharge Plan : Family conference Thursday, 8/15 @ 11:00.  Medical Necessity Expected Level Rationale: Estimated level of assist after  discharge: MIN  Rehab prognosis: indeterminate  Medical prognosis: indeterminate  Intensity and Duration: an average of 3 hours/5 days per week  Medical Supervision and 24 Hour Rehab Nursing: x  Physical Therapy: x  PT Intensity/Duration: 60 minutes/day, 5 days/week for 28 days  Occupational Therapy: x  OT Intensity/Duration: 60 minutes/day, 5 days/week for 28 days  Speech and Language Therapy: x  SLP Intensity/Duration: 60 minutes/day, 5 days/week for 28 days  Social Work: x  Therapeutic Recreation: x  Psychology: x  Updated (if changes indicated)    Anticipated Discharge Date/Estimated Length of Stay:   ELOS: DC 8/23    Based on the patient's medical and functional status, their prognosis and  expected level of functional improvement is: Estimated level of assist after  discharge: MIN/CGA  Rehab prognosis: indeterminate  Medical prognosis: indeterminate      Interdisciplinary Problem/Goals/Status    All Rehab Problems:  Body Systems    [RN] Integumentary(Active)  Current Status(08/07/2019):  Incision L neck glued  Weekly Goal(08/14/2019): No S&S infection noted  Discharge Goal: No S&S infection noted        Communication    [ST] Comprehension(Active)  Current Status(08/12/2019): mod-severely impaired; improved ability to point to  named objects and body parts, still cannod follow verbal commands consistently  or answer yes/no questions reliably  Weekly Goal(08/19/2019): answer simple yes/no questions  Discharge Goal: improved comprehension    [ST] Expression(Active)  Current Status(08/12/2019): severely impaired; single words, perseverations,  neologisms; slight improvement in direct imitation of short words, attempts to  use picture communication but this is minimally reliable  Weekly Goal(08/19/2019): imitate names of objects for ADL tasks  Discharge Goal: functional expression for basic wants/needs        Mobility    [OT] Toilet Transfers(Active)  Current Status(08/12/2019): CGA/MIN  Weekly Goal(08/16/2019): CGA  Discharge Goal: CGA    [OT] Tub/Shower Transfers(Active)  Current Status(08/12/2019): MIN/CGA  Weekly Goal(08/16/2019): CGA  Discharge Goal: CGA    [PT] Bed/Chair/Wheelchair(Active)  Current Status(08/12/2019): CGA  Weekly Goal(08/20/2019): SBA  Discharge Goal: SBA    [PT] Walk(Active)  Current Status(08/12/2019): 220, CGA-SBA  Weekly Goal(08/20/2019): to and from BR, SBA  Discharge Goal: 250` SBA    [PT] Stairs(Active)  Current Status(08/12/2019): 4 steps CGA  Weekly Goal(08/20/2019): PT only  Discharge Goal: 4 steps CGA        Psychosocial    [RN] Coping/Adjustment(Active)  Current Status(08/07/2019): Pt. is non-verbal; Supportive   Weekly Goal(08/14/2019): Identify progress in functional status  Discharge Goal: Demonstrate healthy coping strategies        Safety    [RN] Potential for Injury(Active)  Current Status(08/07/2019): Impulsive; aphasic; does not use call light  Weekly Goal(08/14/2019): Cue to use call light  Discharge Goal: Pt/family will be aware of risk of fall and  safety in the home  setting        Self Care    [OT] Bathing(Active)  Current Status(08/12/2019): MIN/CGA  Weekly Goal(08/16/2019): CGA  Discharge Goal: CGA    [OT] Dressing (Lower)(Active)  Current Status(08/12/2019): CGA/MIN  Weekly Goal(08/16/2019): CGA  Discharge Goal: CGA    [OT] Dressing (Upper)(Active)  Current Status(08/12/2019): MIN with bra fastening  Weekly Goal(08/16/2019): set up  Discharge Goal: set up    [OT] Grooming(Active)  Current Status(08/12/2019): MIN  Weekly Goal(08/16/2019): set up  Discharge Goal: set up    [OT] Toileting(Active)  Current Status(08/12/2019): MOD  Weekly Goal(08/16/2019): MIN/CGA  Discharge Goal: MIN/CGA        Sphincter Control    [RN] Bladder Management(Active)  Current Status(08/07/2019): incontinent bladder 50%  Weekly Goal(08/14/2019): continent 50%  Discharge Goal: continent 100%    [RN] Bowel Management(Active)  Current Status(08/07/2019): incontinent bowel 100%  Weekly Goal(08/14/2019): continent bowel 50%  Discharge Goal: continent bowel 100%        Swallow Function    [ST] Swallowing(Active)  Current Status(08/12/2019): 8/7 upgraded to fork-mashed, NTL. No straw,  supervision needed, check for pocketing. Meds whole/crushed with NTL or puree as  tolerated.  Weekly Goal(08/19/2019): tolerate current diet without s/s pen/asp  Discharge Goal: tolerate least restrictive diet without s/s pen/asp        Comments: 8/6: walker confuses patient so PT not using one at this time;  decreased coordination right hand; some agitation at times; some unsafe  behaviors;    8/13: increased frustration/irritation; balance improved; increased coordination  in right hand; unlikely to tolerate e-stim; impulsive;    Signed by: Star Capps RN    Physician CoSigned By: Ajit Howard 08/13/2019 10:00:21

## 2019-08-13 NOTE — PROGRESS NOTES
Case Management  Inpatient Rehabilitation Plan of Care and Discharge Plan Note    Rehabilitation Diagnosis:  [place]  Date of Onset:  [place]    Medical Summary:  [place]  Past Medical History: [place]    Plan of Care  Updated Problems/Interventions  Field    Expected Intensity:  [place]  Interdisciplinary Team:  [place]  Estimated Length of Stay/Anticipated Discharge Date: [place]  Anticipated Discharge Destination:  Anticipated discharge destination from inpatient rehabilitation is community  discharge with assistance. Family conference Thursday, 8/15 @ 3:00.      Based on the patient's medical and functional status, their prognosis and  expected level of functional improvement is:  [place]    Signed by: ESAU Packer

## 2019-08-13 NOTE — PROGRESS NOTES
Case Management  Inpatient Rehabilitation Plan of Care and Discharge Plan Note    Rehabilitation Diagnosis:  Branch  Date of Onset:  Branch    Medical Summary:  [place]  Past Medical History: [place]    Plan of Care  Updated Problems/Interventions  Field    Expected Intensity:  [place]  Interdisciplinary Team:  [place]  Estimated Length of Stay/Anticipated Discharge Date: [place]  Anticipated Discharge Destination:  Anticipated discharge destination from inpatient rehabilitation is community  discharge with assistance. Family conference Thursday, 8/15 @ 11:00.      Based on the patient's medical and functional status, their prognosis and  expected level of functional improvement is:  [place]    Signed by: ESAU Packer

## 2019-08-13 NOTE — PROGRESS NOTES
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    Appears anxious related to her aphasia and difficulty expressing herself.  Does not indicate any pain complaints.      History taken from: patient chart RN    Objective     Vital Signs  Temp:  [98.3 °F (36.8 °C)] 98.3 °F (36.8 °C)  Heart Rate:  [69-74] 69  Resp:  [16-18] 18  BP: (139-156)/(69-71) 139/69    Objective  Physical Exam  MENTAL STATUS -  AWAKE / ALERT.  Anxious.  HEENT- NCAT,   SCLERA NON-ICTERIC, CONJUNCTIVA PINK, OP MOIST, NO JVD, EARS UNREMARKABLE EXTERNALLY  LUNGS - normal respirations.  Clear to auscultation  HEART- RRR   ABD -     EXT - NO EDEMA OR CYANOSIS  NEURO -alert.  Aphasia.  She will get occasional single word but largely utterances.  She will occasionally follow some simple commands at times, but inconsistent and requires much repetition...        MOTOR EXAM -takes resistance bilaterally.         Results Review:     I reviewed the patient's new clinical results.     CT HEAD - AUGUST 11, 2019  FINDINGS:  Old appearing lacunar type infarcts are again noted about the  right thalamus and basal ganglia regions. There are stable areas of  encephalomalacia in the left parietal and occipital lobes medially.  Generalized atrophy. There are moderately extensive scattered areas of  decreased density in the white matter likely related to chronic  ischemic  gliotic changes.    No hydrocephalus.    Glucose   Date/Time Value Ref Range Status   08/13/2019 0646 151 (H) 70 - 130 mg/dL Final   08/12/2019 2023 200 (H) 70 - 130 mg/dL Final   08/12/2019 1609 109 70 - 130 mg/dL Final   08/12/2019 1101 169 (H) 70 - 130 mg/dL Final   08/12/2019 0608 142 (H) 70 - 130 mg/dL Final   08/11/2019 2039 152 (H) 70 - 130 mg/dL Final   08/11/2019 1558 129 70 - 130 mg/dL Final   08/11/2019 1101 194 (H) 70 - 130 mg/dL Final     Results from last 7 days   Lab Units 08/12/19  0645 08/09/19  0729 08/07/19  0801   WBC 10*3/mm3 5.52 7.10 7.17   HEMOGLOBIN g/dL 9.1* 9.6* 9.6*   HEMATOCRIT % 29.0* 30.0* 31.3*   PLATELETS 10*3/mm3 263 315 374       Ref. Range 5/22/2019 05:44 5/22/2019 11:34 7/9/2019 08:25 7/18/2019 04:49 7/19/2019 05:05 7/23/2019 05:56 8/1/2019 06:48   Hemoglobin Latest Ref Range: 12.0 - 15.9 g/dL 10.8 (L)  10.6 (L) 8.5 (L) 9.7 (L) 9.3 (L) 7.4 (L)   Hematocrit Latest Ref Range: 34.0 - 46.6 % 35.1  33.4 (L) 26.2 (L) 29.9 (L) 28.6 (L) 23.6 (L)     Results from last 7 days   Lab Units 08/12/19  0645 08/09/19  0729 08/07/19  0801   SODIUM mmol/L 141 138 139   POTASSIUM mmol/L 3.3* 3.4* 3.6   CHLORIDE mmol/L 102 99 99   CO2 mmol/L 28.7 27.9 27.5   BUN mg/dL 18 15 14   CREATININE mg/dL 0.63 0.67 0.69   CALCIUM mg/dL 8.6 8.6 8.9   GLUCOSE mg/dL 149* 168* 201*         Ref. Range 8/1/2019 06:47   25 Hydroxy, Vitamin D Latest Ref Range: 30.0 - 100.0 ng/ml 21.8 (L)       Ref. Range 8/1/2019 06:47   Total Cholesterol Latest Ref Range: 0 - 200 mg/dL 105   HDL Cholesterol Latest Ref Range: 40 - 60 mg/dL 40   LDL Cholesterol  Latest Ref Range: 0 - 100 mg/dL 57   VLDL Cholesterol Latest Ref Range: 5 - 40 mg/dL 8   Triglycerides Latest Ref Range: 0 - 150 mg/dL 40   Medication Review: done  Scheduled Meds:    amLODIPine 5 mg Oral Nightly   apixaban 5 mg Oral Q12H   aspirin 325 mg Oral Daily   atorvastatin 80 mg Oral Nightly   brimonidine 1 drop Both Eyes BID   dorzolamide 1 drop Both Eyes  BID   glipiZIDE 5 mg Oral BID AC   hydrALAZINE 25 mg Oral Q8H   insulin regular 0-14 Units Subcutaneous TID AC   lansoprazole 30 mg Oral BID AC   losartan-HCTZ (HYZAAR) 100-12.5 combo dose  Oral Daily   memantine 5 mg Oral Daily   metFORMIN 1,000 mg Oral BID With Meals   nebivolol 20 mg Oral BID   nystatin 5 mL Swish & Spit 4x Daily   potassium chloride 20 mEq Oral Daily With Breakfast   vitamin D 50,000 Units Oral Q7 Days     Continuous Infusions:   PRN Meds:.•  aluminum-magnesium hydroxide-simethicone  •  dextrose  •  dextrose  •  glucagon (human recombinant)  •  nitroglycerin      Assessment/Plan       Stroke (cerebrum) (CMS/[facility])      Assessment & Plan  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  If add Namenda, will probably discontinue amantadine as she continues alert.  August 11-she has some increased irritability at times.  This may be related to the underlying stroke deficits itself.  She does not appear to have any dysuria, no odor to her urine.  Staff reports that for the most part she is eating okay.  Will check a follow-up CT of the head to assess for any interval visual changes.  She is on aspirin and Eliquis for stroke prophylaxis.  She had noticeably improved alertness when amantadine was started.  She is readily alert now.  This may be related to natural recovery in terms of her level of arousal at this point.  Current plan is to discontinue the amantadine to see if that helps with her irritability and start  on Namenda for aphasia.  August 12 -  Patient with increased restlessness at times.   Continues aphasia. Not able to identify any complaint.  Appears anxious today. No change on CT head yesterday.  August 13- will continue to monitor on recent med adjustments    Aphasia -  August 12 - trial of  Namenda    Dysphagia-Cortrak feeding tube discontinued on July 31 and started on puréed/honey thick liquid diet with strategies  August 7-advance to fork mash nectar thick liquid diet, consistent carbohydrate.    History of multiple bilateral strokes and left central retinal artery occlusion    History of left greater than right internal carotid artery stenosis-status post left internal carotid artery stent July 17, 2019    History of hypertension -  Hyzaar 100-25. August 4 - Bystolic increased to 20 mg daily to 20 mg bid.  August 8 - BP still runs high. On discharge in May 2019 was on Hyzaar 100-25 mg daily, Bystolic 20 mg daily, amlodipine 10 mg daily, Hydralazine 50 mg q 8 hours.  Will add Hydralazine initially 10 mg po q 8 hours and titrate up as needed.   August 9-systolic blood pressure elevated last night and early this morning but better this afternoon at 122-130.  Continue to follow recent addition of hydralazine and titrate up as needed  August 10-systolic blood pressure 175 this afternoon, range otherwise 122-142.  Will titrate up on hydralazine to 20 mg every 8 hours.  August 11-hypertension overall appears improved.  Will titrate up further to 25 mg every 8 hours.  August 12 - add amlodipine 5 mg daily.     History of diabetes mellitus -Lantus/glipizide (home medication metformin 1000 mg twice daily and glipizide 10 mg twice daily)  August 1-blood sugars were low yesterday afternoon after tube feeds discontinued.  Held glipizide last night and this morning and Lantus last night.  Blood sugar elevated at noontime today.  Will receive resume glipizide at a lower dose of 5 mg twice a day with meals.  Continue sliding scale insulin coverage.  She also is on metformin at home.  August 5-add back metformin initially at 500 mg twice a day and continue glipizide 5 mg twice a day, both one half of previous home dose, titrate up as needed.  August 7-titrate up metformin to 850 mg twice a day.  Add consistent  carbohydrate restriction to diet.  August 9-increase metformin to 1000 g twice a day.  August 10-blood glucose 340 last evening but 150-114-178 so far today  August 11-continue to follow blood sugar pattern and may increase glipizide further as current dose glipizide 5 mg twice a day along with metformin 1000 mg twice a day    Stroke prophylaxis-aspirin/Eliquis/atorvastatin    Anemia-August 1-hemoglobin 7.4.  Most recent check on July 23 HGB 9.3.  Will recheck hemoglobin this afternoon.  Hemoccult stool.  Iron studies.  Reticulocyte count.  Recheck CBC in the a.m.  Added Protonix.  Patient is on aspirin and Eliquis.  With recent stroke  will look to transfuse packed red blood cell.  August 2-hemoglobin improved 9.0-1 unit packed red blood cells.  Elevated reticulocyte count in response to anemia.  Nursing describes dark stool.  Patient discussed with gastroenterology.  At this point unable to do endoscopy as on Eliquis and aspirin.  Will treat symptomatically for now with increasing proton pump inhibitor and transfusing as needed.  August 5-anemia improved-hemoglobin 9.8    DVT prophylaxis-SCDs/anticoagulation    Impulsivity-   Nursing to do re-orientation with the patient.  Room close to the nursing station.    Endocrine-vitamin D deficiency-ergocalciferol 50,000 units weekly x8 weeks added. Vitamin B12 level and TSH checked in late May unremarkable     Impaired cognition/impaired language, impaired swallow, impaired activity daily living, impaired mobility     Now admit for comprehensive acute inpatient rehabilitation .  This would be an interdisciplinary program with physical therapy 1 hour,  occupational therapy 1 hour, and speech therapy 1 hour, 5 days a week.  Rehabilitation nursing for carryover, monitoring of cardiopulmonary and neurologic   status, bowel and bladder, and skin  Ongoing physician follow-up.  Weekly team conferences.  Goals are indeterminant.   Rehabilitation prognosis determined.  Medical  prognosis determined.  Estimated length of stay is indeterminate.    TEAM CONF - AUGUST 6- TRANFERS CTG. GAIT UP  FEET CTG-MIN ASSIST. UBD MIN. LBD MOD. BATH MOD. SEVERE APHASIA. INCREASED RESISTANCE TO PARTICIPATE AT TIMES.   PUREE/HTL.  GOOD PO INTAKE. ADJUSTING MEDS FOR DIABETES MELLITUS.  BLADDER CONTINENT/INCONTIENT.   ELOS- 2 WEEKS.     TEAM CONF - AUGUST 13 - DID GREAT WITH PT ON Saturday, FOLLOWING COMMANDS AND BATTING A BALL WITH ANOTHER PATIENT. YESTERDAY, VERY FRUSTRATED AND IRRITABLE.  FRUSTRATED BY APHASIA, TRYING TO TELL STAFF SOMETHING. WILL HOLD ON TO OBJECTS, REPEAT SINGLE WORD.   BED CTG. 4 STAIRS CTG.  GAIT 220 FEET CTG-SBA NO DEVICE.  TOILET TRANSFERS CTG-MIN.  GROOMING MIN.  UBD MIN ASSIST FOR BRA, LBD CTG-MIN. BATH CTG-MIN. COORDINATION RUE BETTER.  DYSPHAGIA - IMPROVED - LAI EDGAR NTL. DO NOT FEEL SHE WOULD TOLERATE E-STIM. RECEPTIVE LANGUAGE IMPROVED SLIGHTLY , POINTING TO OBJECTS. EXPRESSIVELY PERSEVERATIVE ON A SINGLE WORD, TRIES TO USE PICTURE BOARD TO COMMUNICATE. YES/NO NOT ACCURATE.  CONTINENT BOWEL AND BLADDER, TIMED VOIDS. SKIN INTACT. TYPICALLY GOOD INTAKE.  ELOS - 1.5 WEEKS    Ajit Howard MD  08/13/19  7:56 AM    Time:

## 2019-08-13 NOTE — PLAN OF CARE
Problem: Skin Injury Risk (Adult)  Goal: Skin Health and Integrity  Outcome: Ongoing (interventions implemented as appropriate)   08/13/19 1546   Skin Injury Risk (Adult)   Skin Health and Integrity making progress toward outcome       Problem: Fall Risk (Adult)  Goal: Absence of Fall  Outcome: Ongoing (interventions implemented as appropriate)   08/13/19 1546   Fall Risk (Adult)   Absence of Fall making progress toward outcome       Problem: Patient Care Overview  Goal: Plan of Care Review  Outcome: Ongoing (interventions implemented as appropriate)   08/13/19 1546   Patient Care Overview   IRF Plan of Care Review progress ongoing, continue   Progress, Functional Goals demonstrating adequate progress   Coping/Psychosocial   Plan of Care Reviewed With patient   OTHER   Outcome Summary Patient has global aphasia, impulsive and eassily frustrated, but did have a better day than yesterday. She takes medication crushed in applesauce, assistx1, and is continent of b/b. Accucheck- ACHS- coverage needed for breakfast and lunch.       Problem: Stroke (IRF) (Adult)  Goal: Promote Optimal Functional [place]  Outcome: Ongoing (interventions implemented as appropriate)   08/13/19 1546   Stroke (IRF) (Adult)   Promote Optimal Functional [place] demonstrating adequate progress

## 2019-08-13 NOTE — PROGRESS NOTES
Inpatient Rehabilitation Plan of Care Note    Plan of Care  Care Plan Reviewed - No updates at this time.    Field    Signed by: Luis Abernathy RN

## 2019-08-13 NOTE — THERAPY TREATMENT NOTE
Inpatient Rehabilitation - Occupational Therapy Treatment Note    [facility]     Patient Name: Darby Workman  : 1945  MRN: [id number]    Today's Date: 2019                 Admit Date: 2019      Visit Dx:  No diagnosis found.    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)   • Right-sided headache   • Acute ischemic stroke (CMS/HCC)   • Embolic stroke (CMS/HCC)   • Cerebral infarction due to stenosis of left carotid artery (CMS/HCC)   • Anticoagulated by anticoagulation treatment   • Stroke (cerebrum) (CMS/HCC)         Therapy Treatment    IRF Treatment Summary     Row Name 19 1600 19 1540 19 1300       Evaluation/Treatment Time and Intent    Subjective Information  no complaints  -MR  -- not wanting to participate in therapy  -AF  no complaints  -KB    Existing Precautions/Restrictions  fall  -MR  fall  -AF  fall communication, swallow  -KB    Document Type  therapy note (daily note)  -MR  therapy note (daily note)  -AF  therapy note (daily note)  -KB    Mode of Treatment  occupational therapy  -MR  occupational therapy  -AF  speech-language pathology  -KB    Patient/Family Observations  pt seated in w/c in dining room following breakfast  -MR  seated in w/c after PT session in therapy gym, pt increased agitation holding on to arm rests, shaking hands, yelling no, difficulty with redirection. walked pt back to room laid in bed  -AF  assisted patient from bed to wc; agreeable to therapy  -KB    Start Time (Evaluation/Treatment)  --  --  1300  -KB    Stop Time (Evaluation/Treatment)  --  --  1330  -KB    Recorded by [MR] Mira Seals, OT [AF] Ingris Ansari OTR [KB] Bruton, Katherine L    Row Name 19 1045 19 0930          Evaluation/Treatment Time and Intent    Subjective Information   no complaints  -MD  no complaints  -KB     Existing Precautions/Restrictions  fall  -MD  fall swallow, communication  -KB     Document Type  therapy note (daily note)  -MD  therapy note (daily note)  -KB     Mode of Treatment  physical therapy  -MD  speech-language pathology  -KB     Patient/Family Observations  Pt sitting in WC showing no signs of acute distress.    -MD  assisted patient from bed to wc; agreeable to therapy, less agitated today, able to focus for structured therapy tasks  -KB     Start Time (Evaluation/Treatment)  --  0930  -KB     Stop Time (Evaluation/Treatment)  --  1000  -KB     Recorded by [MD] Mira Chadwick, PT [KB] Bruton, Katherine L Row Name 08/13/19 1600 08/13/19 1540 08/13/19 1045       Cognition/Psychosocial- PT/OT    Affect/Mental Status (Cognitive)  confused  -MR  agitated;anxious;confused  -AF  --    Behavioral Issues (Cognitive)  --  uncooperative  -AF  --    Orientation Status (Cognition)  unable/difficult to assess  -MR  unable/difficult to assess  -AF  unable/difficult to assess  -MD    Follows Commands (Cognition)  follows one step commands;0-24% accuracy  -MR  follows one step commands;0-24% accuracy  -AF  follows one step commands;0-24% accuracy  -MD    Personal Safety Interventions  fall prevention program maintained;gait belt;nonskid shoes/slippers when out of bed;supervised activity  -MR  fall prevention program maintained;gait belt;nonskid shoes/slippers when out of bed  -AF  fall prevention program maintained;gait belt  -MD    Attention Deficit (Cognitive)  severe deficit  -MR  severe deficit  -AF  --    Memory Deficit (Cognitive)  --  unable/difficult to assess  -AF  --    Safety Deficit (Cognitive)  awareness of need for assistance;impulsivity;insight into deficits/self awareness;judgment  -MR  severe deficit;awareness of need for assistance;insight into deficits/self awareness;impulsivity;judgment  -AF  --    Recorded by [MR] Mira Seals, OT [AF] Ingris Ansari  JOHN Cottrell [MD] Farrukh Mira, PT    Row Name 08/13/19 1045             Bed Mobility Assessment/Treatment    Supine-Sit [place] (Bed Mobility)  supervision  -MD      Recorded by [MD] Mira Chadwick, PT      Row Name 08/13/19 1540             Functional Mobility    Functional Mobility- Comment  after multiple attempts and encouragement pt begrudgingly walked back to room, redirected to sun room and became very upset sitting on the couch, then required assistance to follow directions back to her room  -AF      Recorded by [AF] Ingris Ansari OTR      Row Name 08/13/19 1600             Transfer Assessment/Treatment    Transfer Assessment/Treatment  sit-stand transfer;stand-sit transfer  -MR      Recorded by [MR] Mira SealsFlower, OT      Row Name 08/13/19 1045             Bed-Chair Transfer    Bed-Chair [place] (Transfers)  contact guard  -MD      Recorded by [MD] Mira Chadwick, PT      Row Name 08/13/19 1600 08/13/19 1540 08/13/19 1045       Sit-Stand Transfer    Sit-Stand [place] (Transfers)  contact guard;verbal cues  -MR  contact guard  -AF  contact guard  -MD    Recorded by [MR] Mira SealsFlower, SAL [AF] Ingris Ansari OTR [MD] Farrukh Mira, PT    Row Name 08/13/19 1600 08/13/19 1540 08/13/19 1045       Stand-Sit Transfer    Stand-Sit [place] (Transfers)  contact guard;verbal cues  -MR  contact guard  -AF  contact guard  -MD    Recorded by [MR] Mria SealsFlower, SAL [AF] Ingris Ansari OTR [MD] Mira Chadwick, PT    Row Name 08/13/19 1045             Gait/Stairs Assessment/Training    [place] Level (Gait)  contact guard;stand by assist  -MD      Distance in Feet (Gait)  200x1 and 160` x2  -MD      Pattern (Gait)  step-through  -MD      Deviations/Abnormal Patterns (Gait)  festinating/shuffling;eliza decreased  -MD      Bilateral Gait Deviations  forward flexed posture;heel strike decreased  -MD      [place] Level (Stairs)  verbal cues;contact guard  -MD      Handrail Location  (Stairs)  left side (ascending)  -MD      Number of Steps (Stairs)  4  -MD      Ascending Technique (Stairs)  step-over-step  -MD      Descending Technique (Stairs)  step-to-step  -MD      Recorded by [MD] Mira Chadwick PT      Row Name 08/13/19 1600             Bathing Assessment/Treatment    Bathing [place] Level  bathing skills;minimum assist (75% patient effort);verbal cues  -MR      Bathing Position  sink side;supported sitting;supported standing  -MR      Recorded by [MR] Mira Seals, OT      Row Name 08/13/19 1600             Upper Body Dressing Assessment/Treatment    Upper Body Dressing Task  upper body dressing skills;minimum assist (75% or more patient effort);verbal cues  -MR      Upper Body Dressing Position  supported sitting  -MR      Recorded by [MR] Mira Seals, SAL      Row Name 08/13/19 1600             Lower Body Dressing Assessment/Treatment    Lower Body Dressing [place] Level  doff;don;shoes/slippers;socks;pants/bottoms;minimum assist (75% patient effort);verbal cues  -MR      Lower Body Dressing Position  supported sitting;supported standing  -MR      Recorded by [MR] Mira Seals, OT      Row Name 08/13/19 1600             Grooming Assessment/Treatment    Grooming [place] Level  hair care, combing/brushing;minimum assist (75% patient effort);verbal cues  -MR      Grooming Position  supported sitting  -MR      Recorded by [MR] Mira Sealss, OT      Row Name 08/13/19 1600 08/13/19 1540 08/13/19 1300       Pain Scale: Numbers Pre/Post-Treatment    Pain Scale: Numbers, Pretreatment  0/10 - no pain  -MR  0/10 - no pain  -AF  0/10 - no pain  -KB    Pain Scale: Numbers, Post-Treatment  0/10 - no pain  -MR  0/10 - no pain  -AF  0/10 - no pain  -KB    Recorded by [MR] Mira SealsFlower, OT [AF] Ingris Ansari OTR [KB] Bruton, Katherine L    Row Name 08/13/19 1045 08/13/19 0930          Pain Scale: Numbers Pre/Post-Treatment    Pain Scale: Numbers,  Pretreatment  0/10 - no pain  -MD  0/10 - no pain  -KB     Pain Scale: Numbers, Post-Treatment  --  0/10 - no pain  -KB     Recorded by [MD] Mira Chadwick, PT [KB] Bruton, Katherine HILLARY     Row Name 08/13/19 1540             Therapeutic Exercise    Therapeutic Exercise  seated, upper extremities  -AF      Recorded by [AF] Ingris Ansari OTR      Row Name 08/13/19 1540             Upper Extremity Seated Therapeutic Exercise    Performed, Seated Upper Extremity (Therapeutic Exercise)  shoulder flexion/extension;shoulder horizontal abduction/adduction  -AF      Device, Seated Upper Extremity (Therapeutic Exercise)  elastic bands/tubing yellow  -AF      Exercise Type, Seated Upper Extremity (Therapeutic Exercise)  -- hand gripper  -AF      Expected Outcomes, Seated Upper Extremity (Therapeutic Exercise)  improve performance, BADLs  -AF      Sets/Reps Detail, Seated Upper Extremity (Therapeutic Exercise)  2/15  -AF      Transfers Skills, Training to Functional Activity, Seated Upper Extremity (Therapeutic Exercise)  unable to transfer skills at this time  -AF      Comment, Seated Upper Extremity (Therapeutic Exercise)  only agreeable to completing UE exs bed level, unable to take direction on fixing technique, unabel to use hand gripper on R hand   -AF      Recorded by [AF] Ingris Ansari, JOHN      Row Name 08/13/19 1045             Lower Extremity Standing Therapeutic Exercise    Performed, Standing Lower Extremity (Therapeutic Exercise)  heel raises  -MD      Device, Standing Lower Extremity (Therapeutic Exercise)  neftali bars  -MD      Exercise Type, Standing Lower Extremity (Therapeutic Exercise)  AROM (active range of motion)  -MD      Sets/Reps Detail, Standing Lower Extremity (Therapeutic Exercise)  1/10  -MD      Recorded by [MD] Mira Chadwick, PT      Row Name 08/13/19 1045             Lower Extremity Seated Therapeutic Exercise    Performed, Seated Lower Extremity (Therapeutic Exercise)  hip flexion/extension;hip  abduction/adduction;ankle dorsiflexion/plantarflexion;LAQ (long arc quad), knee extension  -MD      Exercise Type, Seated Lower Extremity (Therapeutic Exercise)  AROM (active range of motion)  -MD      Sets/Reps Detail, Seated Lower Extremity (Therapeutic Exercise)  1/20  -MD      Recorded by [MD] Mira Chadwick, PT      Row Name 08/13/19 1600 08/13/19 1540 08/13/19 1045       Positioning and Restraints    Pre-Treatment Position  sitting in chair/recliner  -MR  sitting in chair/recliner  -AF  in bed  -MD    Post Treatment Position  wheelchair  -MR  bed  -AF  wheelchair  -MD    In Bed  --  supine;call light within reach;encouraged to call for assist;exit alarm on  -AF  --    In Wheelchair  sitting;notified nsg pt seated in W/C at nurse's station  -  --  with OT  -MD    Recorded by [MR] Mira SealsFlower, OT [AF] Ingris Ansari, OTR [MD] Mira Chadwick, PT      User Key  (r) = Recorded By, (t) = Taken By, (c) = Cosigned By    Initials Name Effective Dates    Ingris Youssef, OTR 04/03/18 -     Mira Vaca PT 04/03/18 -      BozenaMira, OT 06/22/16 -     KB Bruton, Katherine L 03/07/18 -           Wound 07/17/19 1015 Left neck incision (Active)   Dressing Appearance open to air;no drainage 8/12/2019  8:00 PM   Closure Liquid skin adhesive 8/12/2019  8:00 PM   Base clean;dry 8/12/2019  8:00 PM   Edges rolled/closed 8/12/2019  8:00 PM   Drainage Amount none 8/13/2019  9:06 AM   Dressing Care, Wound open to air 8/13/2019  9:06 AM         OT Recommendation and Plan                 OT IRF GOALS     Row Name 08/01/19 1159             Transfer Goal 1 (OT-IRF)    Activity/Assistive Device (Transfer Goal 1, OT-IRF)  toilet;shower chair;walk-in shower  -AF      [place] Level (Transfer Goal 1, OT-IRF)  verbal cues required;minimum assist (75% or more patient effort);contact guard assist  -AF      Time Frame (Transfer Goal 1, OT-IRF)  short term goal (STG)  -AF      Progress/Outcomes (Transfer Goal 1, OT-IRF)   goal ongoing  -AF         Transfer Goal 2 (OT-IRF)    Activity/Assistive Device (Transfer Goal 2, OT-IRF)  shower chair;walk-in shower;toilet  -AF      [place] Level (Transfer Goal 2, OT-IRF)  contact guard assist;verbal cues required  -AF      Time Frame (Transfer Goal 2, OT-IRF)  long term goal (LTG)  -AF      Progress/Outcomes (Transfer Goal 2, OT-IRF)  goal ongoing  -AF         Bathing Goal 1 (OT-IRF)    Activity/Device (Bathing Goal 1, OT-IRF)  bathing skills, all;grab bar/tub rail;hand-held shower spray hose;shower chair  -AF      [place] Level (Bathing Goal 1, OT-IRF)  minimum assist (75% or more patient effort);verbal cues required  -AF      Time Frame (Bathing Goal 1, OT-IRF)  short term goal (STG)  -AF      Progress/Outcomes (Bathing Goal 1, OT-IRF)  goal ongoing  -AF         Bathing Goal 2 (OT-IRF)    Activity/Device (Bathing Goal 2, OT-IRF)  bathing skills, all;grab bar/tub rail;hand-held shower spray hose;shower chair  -AF      [place] Level (Bathing Goal 2, OT-IRF)  contact guard assist;verbal cues required  -AF      Time Frame (Bathing Goal 2, OT-IRF)  long term goal (LTG)  -AF      Progress/Outcomes (Bathing Goal 2, OT-IRF)  goal ongoing  -AF         UB Dressing Goal 1 (OT-IRF)    Activity/Device (UB Dressing Goal 1, OT-IRF)  upper body dressing  -AF      [place] (UB Dress Goal 1, OT-IRF)  set-up required  -AF      Time Frame (UB Dressing Goal 1, OT-IRF)  long term goal (LTG)  -AF      Progress/Outcomes (UB Dressing Goal 1, OT-IRF)  goal ongoing  -AF         LB Dressing Goal 1 (OT-IRF)    Activity/Device (LB Dressing Goal 1, OT-IRF)  lower body dressing  -AF      [place] (LB Dressing Goal 1, OT-IRF)  moderate assist (50-74% patient effort);verbal cues required  -AF      Time Frame (LB Dressing Goal 1, OT-IRF)  short term goal (STG)  -AF      Progress/Outcomes (LB Dressing Goal 1, OT-IRF)  goal ongoing  -AF         LB Dressing Goal 2 (OT-IRF)    Activity/Device (LB Dressing  Goal 2, OT-IRF)  lower body dressing  -AF      [place] (LB Dressing Goal 2, OT-IRF)  contact guard assist;verbal cues required  -AF      Time Frame (LB Dressing Goal 2, OT-IRF)  long term goal (LTG)  -AF      Progress/Outcomes (LB Dressing Goal 2, OT-IRF)  goal ongoing  -AF         Grooming Goal 1 (OT-IRF)    Activity/Device (Grooming Goal 1, OT-IRF)  grooming skills, all  -AF      [place] (Grooming Goal 1, OT-IRF)  minimum assist (75% or more patient effort);verbal cues required  -AF      Time Frame (Grooming Goal 1, OT-IRF)  short term goal (STG)  -AF      Progress/Outcomes (Grooming Goal 1, OT-IRF)  goal ongoing  -AF         Grooming Goal 2 (OT-IRF)    Activity/Device (Grooming Goal 2, OT-IRF)  grooming skills, all  -AF      [place] (Grooming Goal 2, OT-IRF)  supervision required;verbal cues required  -AF      Time Frame (Grooming Goal 2, OT-IRF)  long term goal (LTG)  -AF      Progress/Outcomes (Grooming Goal 2, OT-IRF)  goal ongoing  -AF         Toileting Goal 1 (OT-IRF)    Activity/Device (Toileting Goal 1, OT-IRF)  toileting skills, all;grab bar/safety frame;raised toilet seat  -AF      [place] Level (Toileting Goal 1, OT-IRF)  moderate assist (50-74% patient effort);verbal cues required  -AF      Time Frame (Toileting Goal 1, OT-IRF)  short term goal (STG)  -AF      Progress/Outcomes (Toileting Goal 1, OT-IRF)  goal ongoing  -AF         Toileting Goal 2 (OT-IRF)    Activity/Device (Toileting Goal 2, OT-IRF)  toileting skills, all;grab bar/safety frame;raised toilet seat  -AF      [place] Level (Toileting Goal 2, OT-IRF)  verbal cues required;contact guard assist  -AF      Time Frame (Toileting Goal 2, OT-IRF)  long term goal (LTG)  -AF      Progress/Outcomes (Toileting Goal 2, OT-IRF)  goal ongoing  -AF         Caregiver Training Goal 1 (OT-IRF)    Caregiver Training Goal 1 (OT-IRF)  Family and patient to demo safe technique with ADLs, transfers, HEP and adaptive equipment as needed    -AF      Time Frame (Caregiver Training Goal 1, OT-IRF)  long term goal (LTG)  -AF      Progress/Outcomes (Caregiver Training Goal 1, OT-IRF)  goal ongoing  -AF        User Key  (r) = Recorded By, (t) = Taken By, (c) = Cosigned By    Initials Name Provider Type    AF Ingris Ansari, OTR Occupational Therapist          Occupational Therapy Education     Title: PT OT SLP Therapies (Not Started)     Topic: Occupational Therapy (Not Started)     Point: ADL training (In Progress)     Description: Instruct learner(s) on proper safety adaptation and remediation techniques during self care or transfers.   Instruct in proper use of assistive devices.    Learning Progress Summary           Patient Nonacceptance, E, NR by AF at 8/13/2019  3:48 PM    Comment:  benefits and purpose of therapy                               User Key     Initials Effective Dates Name Provider Type Discipline    AF 04/03/18 -  Ingris Ansari, OTR Occupational Therapist OT                       Time Calculation:     Time Calculation- OT     Row Name 08/13/19 1626 08/13/19 1548          Time Calculation- OT    OT Start Time  0800  -MR  1430  -AF     OT Stop Time  0830  -MR  1500  -AF     OT Time Calculation (min)  30 min  -MR  30 min  -AF     Total Timed Code Minutes- OT  30 minute(s)  -MR  --     OT Received On  08/13/19  -MR  --       User Key  (r) = Recorded By, (t) = Taken By, (c) = Cosigned By    Initials Name Provider Type    AF Ingris Ansari, OTR Occupational Therapist    Mira Adair, OT Occupational Therapist          Therapy Charges for Today     Code Description Service Date Service Provider Modifiers Qty    44995205962 HC OT SELF CARE/MGMT/TRAIN EA 15 MIN 8/13/2019 Mira Seals, OT GO 2                   Mira Seals OT  8/13/2019

## 2019-08-13 NOTE — THERAPY TREATMENT NOTE
Inpatient Rehabilitation - Physical Therapy Treatment Note  [facility]     Patient Name: Darby Workman  : 1945  MRN: [id number]    Today's Date: 2019                 Admit Date: 2019      Visit Dx:    No diagnosis found.    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)   • Right-sided headache   • Acute ischemic stroke (CMS/HCC)   • Embolic stroke (CMS/HCC)   • Cerebral infarction due to stenosis of left carotid artery (CMS/HCC)   • Anticoagulated by anticoagulation treatment   • Stroke (cerebrum) (CMS/HCC)       Therapy Treatment    IRF Treatment Summary     Row Name 19 1300 19 1045 19 0930       Evaluation/Treatment Time and Intent    Subjective Information  no complaints  -KB  no complaints  -MD  no complaints  -KB    Existing Precautions/Restrictions  fall communication, swallow  -KB  fall  -MD  fall swallow, communication  -KB    Document Type  therapy note (daily note)  -KB  therapy note (daily note)  -MD  therapy note (daily note)  -KB    Mode of Treatment  speech-language pathology  -KB  physical therapy  -MD  speech-language pathology  -KB    Patient/Family Observations  assisted patient from bed to wc; agreeable to therapy  -KB  Pt sitting in WC showing no signs of acute distress.    -MD  assisted patient from bed to wc; agreeable to therapy, less agitated today, able to focus for structured therapy tasks  -KB    Start Time (Evaluation/Treatment)  1300  -KB  --  0930  -KB    Stop Time (Evaluation/Treatment)  1330  -KB  --  1000  -KB    Recorded by [KB] Bruton, Katherine L [MD] Mira Chadwick, PT [KB] Bruton, Katherine L    Row Name 19 1045             Cognition/Psychosocial- PT/OT    Orientation Status (Cognition)  unable/difficult to assess  -MD      Follows Commands (Cognition)  follows one  step commands;0-24% accuracy  -MD      Personal Safety Interventions  fall prevention program maintained;gait belt  -MD      Recorded by [MD] Mira Chadwick, PT      Row Name 08/13/19 1045             Bed Mobility Assessment/Treatment    Supine-Sit [place] (Bed Mobility)  supervision  -MD      Recorded by [MD] Mira Chadwick, PT      Row Name 08/13/19 1045             Bed-Chair Transfer    Bed-Chair [place] (Transfers)  contact guard  -MD      Recorded by [MD] Mira Chadwick, PT      Row Name 08/13/19 1045             Sit-Stand Transfer    Sit-Stand [place] (Transfers)  contact guard  -MD      Recorded by [MD] Mira Chadwick, PT      Row Name 08/13/19 1045             Stand-Sit Transfer    Stand-Sit [place] (Transfers)  contact guard  -MD      Recorded by [MD] Mira Chadwick, PT      Row Name 08/13/19 1045             Gait/Stairs Assessment/Training    [place] Level (Gait)  contact guard;stand by assist  -MD      Distance in Feet (Gait)  200x1 and 160` x2  -MD      Pattern (Gait)  step-through  -MD      Deviations/Abnormal Patterns (Gait)  festinating/shuffling;eliza decreased  -MD      Bilateral Gait Deviations  forward flexed posture;heel strike decreased  -MD      [place] Level (Stairs)  verbal cues;contact guard  -MD      Handrail Location (Stairs)  left side (ascending)  -MD      Number of Steps (Stairs)  4  -MD      Ascending Technique (Stairs)  step-over-step  -MD      Descending Technique (Stairs)  step-to-step  -MD      Recorded by [MD] Mira Chadwick, PT      Row Name 08/13/19 1300 08/13/19 1045 08/13/19 0930       Pain Scale: Numbers Pre/Post-Treatment    Pain Scale: Numbers, Pretreatment  0/10 - no pain  -KB  0/10 - no pain  -MD  0/10 - no pain  -KB    Pain Scale: Numbers, Post-Treatment  0/10 - no pain  -KB  --  0/10 - no pain  -KB    Recorded by [KB] Bruton, Katherine L [MD] Mira Chadwick PT [KB] Bruton, Katherine L    Row Name 08/13/19 1045             Lower Extremity Standing Therapeutic  Exercise    Performed, Standing Lower Extremity (Therapeutic Exercise)  heel raises  -MD      Device, Standing Lower Extremity (Therapeutic Exercise)  neftali bars  -MD      Exercise Type, Standing Lower Extremity (Therapeutic Exercise)  AROM (active range of motion)  -MD      Sets/Reps Detail, Standing Lower Extremity (Therapeutic Exercise)  1/10  -MD      Recorded by [MD] Mira Chadwick PT      Row Name 08/13/19 1045             Lower Extremity Seated Therapeutic Exercise    Performed, Seated Lower Extremity (Therapeutic Exercise)  hip flexion/extension;hip abduction/adduction;ankle dorsiflexion/plantarflexion;LAQ (long arc quad), knee extension  -MD      Exercise Type, Seated Lower Extremity (Therapeutic Exercise)  AROM (active range of motion)  -MD      Sets/Reps Detail, Seated Lower Extremity (Therapeutic Exercise)  1/20  -MD      Recorded by [MD] Mira Chadwick PT      Row Name 08/13/19 1045             Positioning and Restraints    Pre-Treatment Position  in bed  -MD      Post Treatment Position  wheelchair  -MD      In Wheelchair  with OT  -MD      Recorded by [MD] Mira Chadiwck PT        User Key  (r) = Recorded By, (t) = Taken By, (c) = Cosigned By    Initials Name Effective Dates    Mira Vaca, PT 04/03/18 -     KB Bruton, Katherine L 03/07/18 -         Wound 07/17/19 1015 Left neck incision (Active)   Dressing Appearance open to air;no drainage 8/12/2019  8:00 PM   Closure Liquid skin adhesive 8/12/2019  8:00 PM   Base clean;dry 8/12/2019  8:00 PM   Edges rolled/closed 8/12/2019  8:00 PM   Drainage Amount none 8/13/2019  9:06 AM   Dressing Care, Wound open to air 8/13/2019  9:06 AM     Physical Therapy Education     Title: PT OT SLP Therapies (Not Started)     Topic: Physical Therapy (In Progress)     Point: Mobility training (In Progress)     Learning Progress Summary           Patient Nonacceptance, E,TB,D, NR by ENID at 8/5/2019 12:00 PM    Acceptance, E,D, NR by NESTOR at 8/3/2019  1:35 PM    Acceptance, D, DU,NR  by NESTOR at 8/3/2019  8:56 AM                   Point: Home exercise program (In Progress)     Learning Progress Summary           Patient Nonacceptance, E,TB,D, NR by ENID at 8/5/2019 12:00 PM                   Point: Precautions (In Progress)     Learning Progress Summary           Patient Acceptance, E, NR by MD at 8/13/2019 11:14 AM    Acceptance, E, NR by MD at 8/12/2019 10:45 AM    Acceptance, E, NR by MD at 8/10/2019 11:15 AM    Acceptance, E, NR by MD at 8/9/2019 11:12 AM    Acceptance, E, NR by MD at 8/8/2019 11:48 AM    Acceptance, E, NR by MD at 8/6/2019 10:41 AM    Acceptance, E, NR by MD at 8/2/2019 10:44 AM    Acceptance, E,D, NR by MD at 8/1/2019 12:16 PM                               User Key     Initials Effective Dates Name Provider Type Discipline     04/03/18 -  Nicole Austin, PT Physical Therapist PT    MD 04/03/18 -  Mira Chadwick PT Physical Therapist PT    ENID 04/03/18 -  Marycruz Schmitt, PT Physical Therapist PT                  PT Recommendation and Plan  Anticipated Equipment Needs at Discharge (PT Eval): front wheeled walker  Frequency of Treatment (PT Eval): 5 times per week, 60 minutes per session                     Time Calculation:     PT Charges     Row Name 08/13/19 1413 08/13/19 1045          Time Calculation    Start Time  1400  -MD  1030  -MD     Stop Time  1430  -MD  1100  -MD     Time Calculation (min)  30 min  -MD  30 min  -MD     PT Received On  --  08/13/19  -MD     PT - Next Appointment  --  08/14/19  -MD       User Key  (r) = Recorded By, (t) = Taken By, (c) = Cosigned By    Initials Name Provider Type    Mira Vaca, PT Physical Therapist          Therapy Charges for Today     Code Description Service Date Service Provider Modifiers Qty    10121453702 HC PT THER PROC EA 15 MIN 8/12/2019 Mira Chadwick, PT GP 4    07662077077 HC PT THER SUPP EA 15 MIN 8/12/2019 Mira Chadwick, PT GP 1    38732407186 HC PT THER PROC EA 15 MIN 8/13/2019 Mira Chadwick, PT GP 4                   Mira  Farrukh, PT  8/13/2019

## 2019-08-13 NOTE — PLAN OF CARE
Problem: Skin Injury Risk (Adult)  Goal: Skin Health and Integrity  Outcome: Ongoing (interventions implemented as appropriate)      Problem: Fall Risk (Adult)  Goal: Absence of Fall  Outcome: Ongoing (interventions implemented as appropriate)      Problem: Patient Care Overview  Goal: Plan of Care Review  Outcome: Ongoing (interventions implemented as appropriate)      Problem: Stroke (IRF) (Adult)  Goal: Promote Optimal Functional [place]  Outcome: Ongoing (interventions implemented as appropriate)

## 2019-08-13 NOTE — PROGRESS NOTES
Inpatient Rehabilitation Functional Measures Assessment    Functional Measures  KATI Eating:  [facility] Grooming: [facility] Bathing:  [facility] Upper Body Dressing:  [facility] Lower Body Dressing:  [facility] Toileting:  [facility] Bladder Management  Level of Assistance:  [place]  Frequency/Number of Accidents this Shift:  [facility] Bowel Management  Level of Assistance: [place]  Frequency/Number of Accidents this Shift: [facility] Bed/Chair/Wheelchair Transfer:  Bed/chair/wheelchair Transfer Score = 4.  Patient performs 75% or more of effort and minimal assistance (little/incidental  help/lifting of one limb/steadying) for transferring to and from the  bed/chair/wheelchair, requiring: Contact guard. No assistive devices were  required.  KATI Toilet Transfer:  [facility] Tub/Shower Transfer:  [place]    Previously Documented Mode of Locomotion at Discharge: Field  KATI Expected Mode of Locomotion at Discharge: [facility] Walk/Wheelchair:  WHEELCHAIR OBSERVATION   Activity was not observed.    WALK OBSERVATION   Walk Distance Scale = 3.  Distance walked is greater than 150 feet. Walk Score  = 5.  Patient requires supervision or set up for walking. Stand by assistance.  Patient walked a distance of  200 feet. No assistive devices were required.  KATI Stairs:  Stairs Score = 2.  Incidental assistance with lifting or lowering,  contact guard or steadying was provided. Patient performs 75% or more of effort  and requires minimal contact assistance. Patient negotiated  4 stairs. Patient  requires the following assistive device(s): Handrail(s).    KATI Comprehension:  [facility] Expression:  [facility] Social Interaction:  [facility] Problem Solving:  [facility] Memory:  [place]    Therapy Mode Minutes  Occupational Therapy: [place]  Physical Therapy: Individual: 60 minutes.  Speech Language Pathology:  [place]    Signed by: Mira Chadwick PT

## 2019-08-13 NOTE — PROGRESS NOTES
Inpatient Rehabilitation Functional Measures Assessment    Functional Measures  KATI Eating:  [facility] Grooming: [facility] Bathing:  [facility] Upper Body Dressing:  [facility] Lower Body Dressing:  [facility] Toileting:  [facility] Bladder Management  Level of Assistance:  [place]  Frequency/Number of Accidents this Shift:  [facility] Bowel Management  Level of Assistance: [place]  Frequency/Number of Accidents this Shift: [facility] Bed/Chair/Wheelchair Transfer:  [facility] Toilet Transfer:  [facility] Tub/Shower Transfer:  [place]    Previously Documented Mode of Locomotion at Discharge: Field  KATI Expected Mode of Locomotion at Discharge: [facility] Walk/Wheelchair:  [facility] Stairs:  [facility] Comprehension:  Visual comprehension is the usual mode. Comprehension Score  = 6, Modified [place].  Patient comprehends complex/abstract information in  their primary language, requiring:  KATI Expression:  Non-vocal expression is the usual mode. Expression Score = 6,  Modified [place]. Patient expresses complex/abstract information in their  primary language, requiring:  [place]AT[place] Social Interaction:  Activity was not observed.  [place]AT[place] Problem Solving:  Activity was not observed.  KATI Memory:  Memory Score = 3, Moderate Prompting. Patient recognizes and  remembers 50-74% of the time. Patient requires moderate/some prompting  for  memory for the following:    Therapy Mode Minutes  Occupational Therapy: [place]  Physical Therapy: [place]  Speech Language Pathology:  [place]    Signed by: Luis Abernathy RN

## 2019-08-13 NOTE — PROGRESS NOTES
Inpatient Rehabilitation Functional Measures Assessment    Functional Measures  KATI Eating:  [facility] Grooming: [facility] Bathing:  [facility] Upper Body Dressing:  [facility] Lower Body Dressing:  [facility] Toileting:  [facility] Bladder Management  Level of Assistance:  [place]  Frequency/Number of Accidents this Shift:  [facility] Bowel Management  Level of Assistance: [place]  Frequency/Number of Accidents this Shift: [facility] Bed/Chair/Wheelchair Transfer:  [facility] Toilet Transfer:  [facility] Tub/Shower Transfer:  [place]    Previously Documented Mode of Locomotion at Discharge: Field  KATI Expected Mode of Locomotion at Discharge: [facility] Walk/Wheelchair:  [facility] Stairs:  [facility] Comprehension:  Auditory comprehension is the usual mode. Patient does not  comprehend complex/abstract information in their primary language without  assistance from a helper. Comprehension Score = 2, Maximal Prompting. Patient  comprehends basic daily needs 25-49% of the time. Patient understands simple  information via single words or gestures. Requires maximal/a lot of prompting  (most of the time). No assistive devices were required.  KATI Expression:  Vocal expression is the usual mode. Patient does not express  complex/abstract information in their primary language without a helper.  Expression Score = 2, Maximal Prompting. Patient expresses basic daily needs  25-49% of the time. Patient uses only single words or gestures and requires  maximal/a lot of prompting (most of the time). No assistive devices were  required.  KATI Social Interaction:  Social Interaction Score = 2, Maximal Direction.  Patient interacts appropriately 25-49% of the time. Patient requires maximal/a  lot of direction (most of the time) for the following behavior(s):  [facility]AT[facility] Problem Solving:  Activity was not observed.  KATI Memory:  Activity was not observed.    Therapy Mode Minutes  Occupational Therapy: [place]  Physical  Therapy: [place]  Speech Language Pathology:  [place]    Signed by: Eryn Yan RN

## 2019-08-13 NOTE — THERAPY TREATMENT NOTE
Inpatient Rehabilitation - Speech Language Pathology Treatment Note    [facility]    Patient Name: Darby Workman  : 1945  MRN: [id number]  Today's Date: 2019      Admit Date: 2019  Visit Dx:    No diagnosis found.    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)   • Right-sided headache   • Acute ischemic stroke (CMS/HCC)   • Embolic stroke (CMS/HCC)   • Cerebral infarction due to stenosis of left carotid artery (CMS/HCC)   • Anticoagulated by anticoagulation treatment   • Stroke (cerebrum) (CMS/HCC)     Therapy Treatment  Evaluation/Coping  Evaluation/Treatment Time and Intent  Subjective Information: no complaints (19 1300 : Bruton, Katherine L)  Existing Precautions/Restrictions: fall(communication, swallow) (19 1300 : Bruton, Katherine L)  Document Type: therapy note (daily note) (19 1300 : Bruton, Katherine L)  Mode of Treatment: speech-language pathology (19 1300 : Bruton, Katherine L)  Patient/Family Observations: assisted patient from bed to wc; agreeable to therapy (19 1300 : Bruton, Katherine L)  Start Time (Evaluation/Treatment): 1300 (19 1300 : Bruton, Katherine L)  Stop Time (Evaluation/Treatment): 1330 (19 1300 : Bruton, Katherine L)    Vitals/Pain/Safety  Pain Scale: Numbers Pre/Post-Treatment  Pain Scale: Numbers, Pretreatment: 0/10 - no pain (19 1300 : Bruton, Katherine L)  Pain Scale: Numbers, Post-Treatment: 0/10 - no pain (19 1300 : Bruton, Katherine L)    EDUCATION  The patient has been educated in the following areas:   Communication Impairment.    SLP GOALS     Row Name 19 1300 19 0930 19 1300       Oral Nutrition/Hydration Goal 2 (SLP)    Barriers (Oral Nutrition/Hydration Goal 2, SLP)  No overt s/s pen/asp with TL via cup  throughout session.  -KB  --  --    Progress/Outcomes (Oral Nutrition/Hydration Goal 2, SLP)  good progress toward goal;goal ongoing  -KB  --  --       Words/Phrases/Sentences Goal 1 (SLP)    Progress (Ability to Contruct Words/Phrases/Sentences Goal 1, SLP)  with maximum cues (25-49%)  -KB  --  with minimal cues (75-90%)  -KB    Progress/Outcomes (Identify Objects and Pictures Goal 1, SLP)  goal ongoing  -KB  --  good progress toward goal;goal ongoing  -KB    Comment (Words/Phrases/Sentences Goal 1, SLP)  40% identifying pictured objects by name in fo3; 30% pointing to named body parts, 50% givne 2 visual choices, 100% with direct model  -KB  --  80% indetifying named picture in fo2  -KB       Comprehend Questions Goal 1 (SLP)    Progress (Ability to Comprehend Questions Goal 1, SLP)  --  with maximum cues (25-49%);with 1:1 supervision/constant cues  -KB  --    Progress/Outcomes (Comprehend Questions Goal 1, SLP)  --  goal ongoing  -KB  --    Comment (Comprehend Questions Goal 1, SLP)  --  20% answering basic yes/no questions presented verbally with written answer choices, 40% with maximal cues  -KB  --       Reading Comprehension of Basic Signs and Letters Goal 1 (SLP)    Progress (Reading Comprehension of Basic Signs and Letters Goal 1, SLP)  --  independently (over 90% accuracy)  -KB  --    Progress/Outcomes (Reading Comprehension of Basic Signs and Letters Goal 1, SLP)  --  good progress toward goal;goal ongoing  -KB  --    Comment (Reading Comprehension of Basic Signs and Letters Goal 1, SLP)  --  100% matching number to like number in fo4  -KB  --       Word Retrieval Skills Goal 1 (SLP)    Progress (Word Retrieval Skills Goal 1, SLP)  with moderate cues (50-74%)  -KB  with moderate cues (50-74%);with maximum cues (25-49%)  -KB  with 1:1 supervision/constant cues  -KB    Progress/Outcomes (Word Retrieval Goal 1, SLP)  good progress toward goal;goal ongoing  -KB  goal ongoing  -KB  goal ongoing  -KB    Comment  (Word Retrieval Goal 1, SLP)  increased attempts to sing along with alphabet given visual support and verbal model, 50% overall approximation across 3 trials, perseverations noted consistently, patient able to maintain svetlana of alphabet song  -KB  50%, 30%, 40% counting 1-10 with visual support and direct verbal model across 3 trials respectively  -KB  10% naming of pictured objects, 60% with direct verbal/visual model  -KB       Graphic Expression of Shapes, Letters, Numbers Goal 1 (SLP)    Progress (Graphic Expression of Shapes, Letters, and Numbers Goal 1, SLP)  with moderate cues (50-74%)  -KB  --  --    Progress/Outcomes (Graphic Expression of Shapes, Letters, and Numbers Goal 1, SLP)  goal ongoing  -KB  --  --    Comment (Graphic Expression of Shapes, Letters, and Numbers Goal 1, SLP)  70% filling in blanks from written numbers 1-10 and alphabet A-N  -KB  --  --       Graphic Expression of Words Goal 1 (SLP)    Progress (Graphic Expression of Single Words Goal 1, SLP)  --  independently (over 90% accuracy)  -KB  --    Progress/Outcomes (Graphic Expression of Single Words Goal 1, SLP)  --  good progress toward goal;goal ongoing  -KB  --    Comment (Graphic Expression of Single Words Goal 1, SLP)  --  100% accuracy copying her first and last name as well as 3 letter words (x6), written letter approximations noted, but considered legible with written model present  -KB  --       Planning and Execution of Connected Speech Goal 1 (SLP)    Progress (Planning and Execution of Connected Speech Goal 1, SLP)  --  with maximum cues (25-49%)  -KB  with maximum cues (25-49%)  -KB    Progress/Outcomes (Planning and Execution of Connected Speech Goal 1, SLP)  --  goal ongoing  -KB  goal ongoing  -KB    Comment (Planning and Execution of Connected Speech Goal 1, SLP)  --  90% imitation of functional CV words, 20% approximations  -KB  40% imitation of functional CV words  -KB       Additional Goal 1 (SLP)    Barriers  (Additional Goal 1, SLP)  --  patient able to complete 0/2 trials accurately on attempted basic clock reading task with 3 written choices of given time, max cues not effective and patient became frustrated with task so it was discontinued  -KB  80% matching like pictures from one side of the page to the other in fo5  -KB    Progress/Outcomes (Additional Goal 1, SLP)  --  goal ongoing  -KB  good progress toward goal;goal ongoing  -KB    Row Name 08/12/19 0930             Words/Phrases/Sentences Goal 1 (SLP)    Barriers (Identify Objects and Pictures Goal 1, SLP)  poor effort this date, agitated  -KB      Progress (Ability to Contruct Words/Phrases/Sentences Goal 1, SLP)  with 1:1 supervision/constant cues  -KB      Progress/Outcomes (Identify Objects and Pictures Goal 1, SLP)  goal ongoing  -KB      Comment (Words/Phrases/Sentences Goal 1, SLP)  attempted identification of pictured in fo2, patient tapped both pictures with both hands on 3/3 trials despite maximal cues including [place] assist initially  -KB         Planning and Execution of Connected Speech Goal 1 (SLP)    Barriers (Planning and Execution of Connected Speech Goal 1, SLP)  poor effort this date, agitated  -KB      Progress (Planning and Execution of Connected Speech Goal 1, SLP)  with 1:1 supervision/constant cues  -KB      Progress/Outcomes (Planning and Execution of Connected Speech Goal 1, SLP)  goal ongoing  -KB      Comment (Planning and Execution of Connected Speech Goal 1, SLP)  0% imitating functional CV words despite maximal cues, patient pointed to her face each trial but no verbalizations produced  -KB         Augmentative/Alternative Communication Objectives Goal 1 (SLP    Progress (Augmentative/Alternative Communication Goal 1, SLP)  with 1:1 supervision/constant cues  -KB      Progress/Outcomes (Augmentative/Alternative Communication Goal 1, SLP)  goal ongoing  -KB      Comment (Augmentative/Alternative Communication Goal 1, SLP)  Patient  "perseveratively pointed to \"phone\" icon on basic communication picture board and reached for office phone. She is not able to use the phone appropriately at this time, so I told her I would have nursing staff try to call family after her session. She was not able to be redirected from trying to communicate something using communication picture boards, but she continued pointing around randomly to various items including nurse, blanket, toilet then when ashed if she needed these items would reaspond \"no\".   -KB        User Key  (r) = Recorded By, (t) = Taken By, (c) = Cosigned By    Initials Name Provider Type    KB Bruton, Katherine L Speech and Language Pathologist        Time Calculation:   Time Calculation- SLP     Row Name 19 1329 19 1000          Time Calculation- SLP    SLP Start Time  1300  -KB  0930  -KB     SLP Stop Time  1330  -KB  1000  -KB     SLP Time Calculation (min)  30 min  -KB  30 min  -KB     SLP Non-Billable Time (min)  --  10 min rounds  -KB     SLP Received On  19  -KB  19  -KB       User Key  (r) = Recorded By, (t) = Taken By, (c) = Cosigned By    Initials Name Provider Type    KB Bruton, Katherine L Speech and Language Pathologist            Therapy Charges for Today     Code Description Service Date Service Provider Modifiers Qty    05328511373 HC ST TREATMENT SPEECH 4 2019 Bruton, Katherine L GN 1    [id number] HC ST TREATMENT SWALLOW 1 2019 Bruton, Katherine L GN 1    [id number] HC ST TREATMENT SPEECH 3 2019 Bruton, Katherine L GN 1                           Katherine L Bruton  2019     and Inpatient Rehabilitation - [facility] Speech Language Pathology   Swallow Treatment Note/Discharge   [facility]     Patient Name: Darby Workman  : 1945  MRN: [id number]  Today's Date: 2019               Admit Date: 2019    Visit Dx:    No diagnosis found.  Patient Active Problem List   Diagnosis   • Hypertensive crisis   • Diabetes mellitus " (CMS/HCC)   • Hyperlipidemia   • Hypertension   • Elevated troponin   • Acute cerebrovascular accident (CVA) of cerebellum (CMS/HCC)   • Right-sided headache   • Acute ischemic stroke (CMS/HCC)   • Embolic stroke (CMS/HCC)   • Cerebral infarction due to stenosis of left carotid artery (CMS/HCC)   • Anticoagulated by anticoagulation treatment   • Stroke (cerebrum) (CMS/HCC)     Therapy Treatment  Evaluation/Coping  Evaluation/Treatment Time and Intent  Subjective Information: no complaints (08/13/19 1300 : Bruton, Katherine L)  Existing Precautions/Restrictions: fall(communication, swallow) (08/13/19 1300 : Bruton, Katherine L)  Document Type: therapy note (daily note) (08/13/19 1300 : Bruton, Katherine L)  Mode of Treatment: speech-language pathology (08/13/19 1300 : Bruton, Katherine L)  Patient/Family Observations: assisted patient from bed to wc; agreeable to therapy (08/13/19 1300 : Bruton, Katherine L)  Start Time (Evaluation/Treatment): 1300 (08/13/19 1300 : Bruton, Katherine L)  Stop Time (Evaluation/Treatment): 1330 (08/13/19 1300 : Bruton, Katherine L)    Vitals/Pain/Safety  Pain Scale: Numbers Pre/Post-Treatment  Pain Scale: Numbers, Pretreatment: 0/10 - no pain (08/13/19 1300 : Bruton, Katherine L)  Pain Scale: Numbers, Post-Treatment: 0/10 - no pain (08/13/19 1300 : Bruton, Katherine L)    SLP GOALS     Row Name 08/13/19 1300 08/13/19 0930 08/12/19 1300       Oral Nutrition/Hydration Goal 2 (SLP)    Barriers (Oral Nutrition/Hydration Goal 2, SLP)  No overt s/s pen/asp with TL via cup throughout session.  -KB  --  --    Progress/Outcomes (Oral Nutrition/Hydration Goal 2, SLP)  good progress toward goal;goal ongoing  -KB  --  --       Words/Phrases/Sentences Goal 1 (SLP)    Progress (Ability to Contruct Words/Phrases/Sentences Goal 1, SLP)  with maximum cues (25-49%)  -KB  --  with minimal cues (75-90%)  -KB    Progress/Outcomes (Identify Objects and Pictures Goal 1, SLP)  goal ongoing  -KB  --  good  progress toward goal;goal ongoing  -KB    Comment (Words/Phrases/Sentences Goal 1, SLP)  40% identifying pictured objects by name in fo3; 30% pointing to named body parts, 50% givne 2 visual choices, 100% with direct model  -KB  --  80% indetifying named picture in fo2  -KB       Comprehend Questions Goal 1 (SLP)    Progress (Ability to Comprehend Questions Goal 1, SLP)  --  with maximum cues (25-49%);with 1:1 supervision/constant cues  -KB  --    Progress/Outcomes (Comprehend Questions Goal 1, SLP)  --  goal ongoing  -KB  --    Comment (Comprehend Questions Goal 1, SLP)  --  20% answering basic yes/no questions presented verbally with written answer choices, 40% with maximal cues  -KB  --       Reading Comprehension of Basic Signs and Letters Goal 1 (SLP)    Progress (Reading Comprehension of Basic Signs and Letters Goal 1, SLP)  --  independently (over 90% accuracy)  -KB  --    Progress/Outcomes (Reading Comprehension of Basic Signs and Letters Goal 1, SLP)  --  good progress toward goal;goal ongoing  -KB  --    Comment (Reading Comprehension of Basic Signs and Letters Goal 1, SLP)  --  100% matching number to like number in fo4  -KB  --       Word Retrieval Skills Goal 1 (SLP)    Progress (Word Retrieval Skills Goal 1, SLP)  with moderate cues (50-74%)  -KB  with moderate cues (50-74%);with maximum cues (25-49%)  -KB  with 1:1 supervision/constant cues  -KB    Progress/Outcomes (Word Retrieval Goal 1, SLP)  good progress toward goal;goal ongoing  -KB  goal ongoing  -KB  goal ongoing  -KB    Comment (Word Retrieval Goal 1, SLP)  increased attempts to sing along with alphabet given visual support and verbal model, 50% overall approximation across 3 trials, perseverations noted consistently, patient able to maintain svetlana of alphabet song  -KB  50%, 30%, 40% counting 1-10 with visual support and direct verbal model across 3 trials respectively  -KB  10% naming of pictured objects, 60% with direct verbal/visual  model  -KB       Graphic Expression of Shapes, Letters, Numbers Goal 1 (SLP)    Progress (Graphic Expression of Shapes, Letters, and Numbers Goal 1, SLP)  with moderate cues (50-74%)  -KB  --  --    Progress/Outcomes (Graphic Expression of Shapes, Letters, and Numbers Goal 1, SLP)  goal ongoing  -KB  --  --    Comment (Graphic Expression of Shapes, Letters, and Numbers Goal 1, SLP)  70% filling in blanks from written numbers 1-10 and alphabet A-N  -KB  --  --       Graphic Expression of Words Goal 1 (SLP)    Progress (Graphic Expression of Single Words Goal 1, SLP)  --  independently (over 90% accuracy)  -KB  --    Progress/Outcomes (Graphic Expression of Single Words Goal 1, SLP)  --  good progress toward goal;goal ongoing  -KB  --    Comment (Graphic Expression of Single Words Goal 1, SLP)  --  100% accuracy copying her first and last name as well as 3 letter words (x6), written letter approximations noted, but considered legible with written model present  -KB  --       Planning and Execution of Connected Speech Goal 1 (SLP)    Progress (Planning and Execution of Connected Speech Goal 1, SLP)  --  with maximum cues (25-49%)  -KB  with maximum cues (25-49%)  -KB    Progress/Outcomes (Planning and Execution of Connected Speech Goal 1, SLP)  --  goal ongoing  -KB  goal ongoing  -KB    Comment (Planning and Execution of Connected Speech Goal 1, SLP)  --  90% imitation of functional CV words, 20% approximations  -KB  40% imitation of functional CV words  -KB       Additional Goal 1 (SLP)    Barriers (Additional Goal 1, SLP)  --  patient able to complete 0/2 trials accurately on attempted basic clock reading task with 3 written choices of given time, max cues not effective and patient became frustrated with task so it was discontinued  -KB  80% matching like pictures from one side of the page to the other in fo5  -KB    Progress/Outcomes (Additional Goal 1, SLP)  --  goal ongoing  -KB  good progress toward goal;goal  "ongoing  -KB    Row Name 08/12/19 0930             Words/Phrases/Sentences Goal 1 (SLP)    Barriers (Identify Objects and Pictures Goal 1, SLP)  poor effort this date, agitated  -KB      Progress (Ability to Contruct Words/Phrases/Sentences Goal 1, SLP)  with 1:1 supervision/constant cues  -KB      Progress/Outcomes (Identify Objects and Pictures Goal 1, SLP)  goal ongoing  -KB      Comment (Words/Phrases/Sentences Goal 1, SLP)  attempted identification of pictured in fo2, patient tapped both pictures with both hands on 3/3 trials despite maximal cues including [place] assist initially  -KB         Planning and Execution of Connected Speech Goal 1 (SLP)    Barriers (Planning and Execution of Connected Speech Goal 1, SLP)  poor effort this date, agitated  -KB      Progress (Planning and Execution of Connected Speech Goal 1, SLP)  with 1:1 supervision/constant cues  -KB      Progress/Outcomes (Planning and Execution of Connected Speech Goal 1, SLP)  goal ongoing  -KB      Comment (Planning and Execution of Connected Speech Goal 1, SLP)  0% imitating functional CV words despite maximal cues, patient pointed to her face each trial but no verbalizations produced  -KB         Augmentative/Alternative Communication Objectives Goal 1 (SLP    Progress (Augmentative/Alternative Communication Goal 1, SLP)  with 1:1 supervision/constant cues  -KB      Progress/Outcomes (Augmentative/Alternative Communication Goal 1, SLP)  goal ongoing  -KB      Comment (Augmentative/Alternative Communication Goal 1, SLP)  Patient perseveratively pointed to \"phone\" icon on basic communication picture board and reached for office phone. She is not able to use the phone appropriately at this time, so I told her I would have nursing staff try to call family after her session. She was not able to be redirected from trying to communicate something using communication picture boards, but she continued pointing around randomly to various items including " "nurse, blanket, toilet then when ashed if she needed these items would reaspond \"no\".   -KB        User Key  (r) = Recorded By, (t) = Taken By, (c) = Cosigned By    Initials Name Provider Type    KB Bruton, Katherine L Speech and Language Pathologist        EDUCATION  The patient has been educated in the following areas:   Dysphagia (Swallowing Impairment).    Time Calculation:   Time Calculation- SLP     Row Name 08/13/19 1329 08/13/19 1000          Time Calculation- SLP    SLP Start Time  1300  -KB  0930  -KB     SLP Stop Time  1330  -KB  1000  -KB     SLP Time Calculation (min)  30 min  -KB  30 min  -KB     SLP Non-Billable Time (min)  --  10 min rounds  -KB     SLP Received On  08/13/19  -KB  08/13/19  -KB       User Key  (r) = Recorded By, (t) = Taken By, (c) = Cosigned By    Initials Name Provider Type    KB Bruton, Katherine L Speech and Language Pathologist        Therapy Charges for Today     Code Description Service Date Service Provider Modifiers Qty    60295054192 HC ST TREATMENT SPEECH 4 8/12/2019 Bruton, Katherine L GN 1    [id number] HC ST TREATMENT SWALLOW 1 8/13/2019 Bruton, Katherine L GN 1    [id number] HC ST TREATMENT SPEECH 3 8/13/2019 Bruton, Katherine L GN 1        SLP Discharge Summary  Anticipated Dischage Disposition: home with assist, anticipate therapy at next level of care    Katherine L Bruton  8/13/2019  "

## 2019-08-14 LAB
ANION GAP SERPL CALCULATED.3IONS-SCNC: 9.7 MMOL/L (ref 5–15)
BASOPHILS # BLD AUTO: 0.06 10*3/MM3 (ref 0–0.2)
BASOPHILS NFR BLD AUTO: 1.2 % (ref 0–1.5)
BUN BLD-MCNC: 19 MG/DL (ref 8–23)
BUN/CREAT SERPL: 29.7 (ref 7–25)
CALCIUM SPEC-SCNC: 8.6 MG/DL (ref 8.6–10.5)
CHLORIDE SERPL-SCNC: 102 MMOL/L (ref 98–107)
CO2 SERPL-SCNC: 27.3 MMOL/L (ref 22–29)
CREAT BLD-MCNC: 0.64 MG/DL (ref 0.57–1)
DEPRECATED RDW RBC AUTO: 57.4 FL (ref 37–54)
EOSINOPHIL # BLD AUTO: 0.18 10*3/MM3 (ref 0–0.4)
EOSINOPHIL NFR BLD AUTO: 3.6 % (ref 0.3–6.2)
ERYTHROCYTE [DISTWIDTH] IN BLOOD BY AUTOMATED COUNT: 16.3 % (ref 12.3–15.4)
GFR SERPL CREATININE-BSD FRML MDRD: 90 ML/MIN/1.73
GLUCOSE BLD-MCNC: 152 MG/DL (ref 65–99)
GLUCOSE BLDC GLUCOMTR-MCNC: 114 MG/DL (ref 70–130)
GLUCOSE BLDC GLUCOMTR-MCNC: 154 MG/DL (ref 70–130)
GLUCOSE BLDC GLUCOMTR-MCNC: 187 MG/DL (ref 70–130)
GLUCOSE BLDC GLUCOMTR-MCNC: 69 MG/DL (ref 70–130)
HCT VFR BLD AUTO: 28.9 % (ref 34–46.6)
HGB BLD-MCNC: 9 G/DL (ref 12–15.9)
IMM GRANULOCYTES # BLD AUTO: 0.02 10*3/MM3 (ref 0–0.05)
IMM GRANULOCYTES NFR BLD AUTO: 0.4 % (ref 0–0.5)
LYMPHOCYTES # BLD AUTO: 0.93 10*3/MM3 (ref 0.7–3.1)
LYMPHOCYTES NFR BLD AUTO: 18.7 % (ref 19.6–45.3)
MCH RBC QN AUTO: 29.6 PG (ref 26.6–33)
MCHC RBC AUTO-ENTMCNC: 31.1 G/DL (ref 31.5–35.7)
MCV RBC AUTO: 95.1 FL (ref 79–97)
MONOCYTES # BLD AUTO: 0.4 10*3/MM3 (ref 0.1–0.9)
MONOCYTES NFR BLD AUTO: 8 % (ref 5–12)
NEUTROPHILS # BLD AUTO: 3.38 10*3/MM3 (ref 1.7–7)
NEUTROPHILS NFR BLD AUTO: 68.1 % (ref 42.7–76)
NRBC BLD AUTO-RTO: 0 /100 WBC (ref 0–0.2)
PLATELET # BLD AUTO: 258 10*3/MM3 (ref 140–450)
PMV BLD AUTO: 10 FL (ref 6–12)
POTASSIUM BLD-SCNC: 3.7 MMOL/L (ref 3.5–5.2)
RBC # BLD AUTO: 3.04 10*6/MM3 (ref 3.77–5.28)
SODIUM BLD-SCNC: 139 MMOL/L (ref 136–145)
WBC NRBC COR # BLD: 4.97 10*3/MM3 (ref 3.4–10.8)

## 2019-08-14 PROCEDURE — 80048 BASIC METABOLIC PNL TOTAL CA: CPT | Performed by: PHYSICAL MEDICINE & REHABILITATION

## 2019-08-14 PROCEDURE — 97110 THERAPEUTIC EXERCISES: CPT

## 2019-08-14 PROCEDURE — 63710000001 INSULIN REGULAR HUMAN PER 5 UNITS: Performed by: PHYSICAL MEDICINE & REHABILITATION

## 2019-08-14 PROCEDURE — 82962 GLUCOSE BLOOD TEST: CPT

## 2019-08-14 PROCEDURE — 97112 NEUROMUSCULAR REEDUCATION: CPT

## 2019-08-14 PROCEDURE — 85025 COMPLETE CBC W/AUTO DIFF WBC: CPT | Performed by: PHYSICAL MEDICINE & REHABILITATION

## 2019-08-14 PROCEDURE — 92526 ORAL FUNCTION THERAPY: CPT

## 2019-08-14 PROCEDURE — 92507 TX SP LANG VOICE COMM INDIV: CPT

## 2019-08-14 PROCEDURE — 97535 SELF CARE MNGMENT TRAINING: CPT

## 2019-08-14 RX ORDER — HYDRALAZINE HYDROCHLORIDE 50 MG/1
50 TABLET, FILM COATED ORAL EVERY 8 HOURS SCHEDULED
Status: DISCONTINUED | OUTPATIENT
Start: 2019-08-14 | End: 2019-08-29 | Stop reason: HOSPADM

## 2019-08-14 RX ADMIN — LOSARTAN POTASSIUM: 50 TABLET, FILM COATED ORAL at 07:41

## 2019-08-14 RX ADMIN — NEBIVOLOL HYDROCHLORIDE 20 MG: 10 TABLET ORAL at 21:53

## 2019-08-14 RX ADMIN — NYSTATIN 500000 UNITS: 100000 SUSPENSION ORAL at 21:53

## 2019-08-14 RX ADMIN — NYSTATIN 500000 UNITS: 100000 SUSPENSION ORAL at 07:41

## 2019-08-14 RX ADMIN — MEMANTINE HYDROCHLORIDE 5 MG: 5 TABLET, FILM COATED ORAL at 07:42

## 2019-08-14 RX ADMIN — ATORVASTATIN CALCIUM 80 MG: 80 TABLET, FILM COATED ORAL at 21:53

## 2019-08-14 RX ADMIN — NEBIVOLOL HYDROCHLORIDE 20 MG: 10 TABLET ORAL at 07:42

## 2019-08-14 RX ADMIN — GLIPIZIDE 5 MG: 5 TABLET ORAL at 16:50

## 2019-08-14 RX ADMIN — BRIMONIDINE TARTRATE 1 DROP: 2 SOLUTION OPHTHALMIC at 21:53

## 2019-08-14 RX ADMIN — APIXABAN 5 MG: 5 TABLET, FILM COATED ORAL at 21:53

## 2019-08-14 RX ADMIN — HYDRALAZINE HYDROCHLORIDE 25 MG: 25 TABLET, FILM COATED ORAL at 14:33

## 2019-08-14 RX ADMIN — METFORMIN HYDROCHLORIDE 1000 MG: 1000 TABLET ORAL at 16:51

## 2019-08-14 RX ADMIN — DORZOLAMIDE HYDROCHLORIDE 1 DROP: 20 SOLUTION/ DROPS OPHTHALMIC at 21:53

## 2019-08-14 RX ADMIN — AMLODIPINE BESYLATE 5 MG: 5 TABLET ORAL at 21:53

## 2019-08-14 RX ADMIN — LANSOPRAZOLE 30 MG: KIT at 16:50

## 2019-08-14 RX ADMIN — APIXABAN 5 MG: 5 TABLET, FILM COATED ORAL at 07:42

## 2019-08-14 RX ADMIN — HYDRALAZINE HYDROCHLORIDE 25 MG: 25 TABLET, FILM COATED ORAL at 06:18

## 2019-08-14 RX ADMIN — POTASSIUM CHLORIDE 20 MEQ: 1.5 POWDER, FOR SOLUTION ORAL at 07:42

## 2019-08-14 RX ADMIN — DORZOLAMIDE HYDROCHLORIDE 1 DROP: 20 SOLUTION/ DROPS OPHTHALMIC at 07:41

## 2019-08-14 RX ADMIN — NYSTATIN 500000 UNITS: 100000 SUSPENSION ORAL at 11:54

## 2019-08-14 RX ADMIN — HYDRALAZINE HYDROCHLORIDE 50 MG: 50 TABLET, FILM COATED ORAL at 21:53

## 2019-08-14 RX ADMIN — METFORMIN HYDROCHLORIDE 1000 MG: 1000 TABLET ORAL at 07:42

## 2019-08-14 RX ADMIN — LANSOPRAZOLE 30 MG: KIT at 06:18

## 2019-08-14 RX ADMIN — ASPIRIN 325 MG: 325 TABLET ORAL at 07:42

## 2019-08-14 RX ADMIN — GLIPIZIDE 5 MG: 5 TABLET ORAL at 06:18

## 2019-08-14 RX ADMIN — BRIMONIDINE TARTRATE 1 DROP: 2 SOLUTION OPHTHALMIC at 07:41

## 2019-08-14 RX ADMIN — NYSTATIN 500000 UNITS: 100000 SUSPENSION ORAL at 16:50

## 2019-08-14 RX ADMIN — INSULIN HUMAN 3 UNITS: 100 INJECTION, SOLUTION PARENTERAL at 07:41

## 2019-08-14 RX ADMIN — INSULIN HUMAN 3 UNITS: 100 INJECTION, SOLUTION PARENTERAL at 16:50

## 2019-08-14 NOTE — THERAPY TREATMENT NOTE
Inpatient Rehabilitation - Physical Therapy Treatment Note  [facility]     Patient Name: Darby Workman  : 1945  MRN: [id number]  Today's Date: 2019             Admit Date: 2019    Visit Dx:  No diagnosis found.  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)   • Right-sided headache   • Acute ischemic stroke (CMS/HCC)   • Embolic stroke (CMS/HCC)   • Cerebral infarction due to stenosis of left carotid artery (CMS/HCC)   • Anticoagulated by anticoagulation treatment   • Stroke (cerebrum) (CMS/HCC)       Therapy Treatment    Rehabilitation Treatment Summary     Row Name                Wound 19 1015 Left neck incision    Wound - Properties Group Date first assessed: 19 [LD] Time first assessed:  [LD] Side: Left [LD] Location: neck [LD] Type: incision [LD] Recorded by:  [LD] Martha Foster RN 19 1015      User Key  (r) = Recorded By, (t) = Taken By, (c) = Cosigned By    Initials Name Effective Dates Discipline    LD Martha Foster RN 16 -  Nurse          Wound 19 1015 Left neck incision (Active)   Dressing Appearance open to air 2019  7:45 AM   Closure Liquid skin adhesive 2019  7:45 AM   Base dry;clean 2019  7:45 AM   Edges rolled/closed 2019  7:45 AM   Drainage Amount none 2019  7:45 AM   Dressing Care, Wound open to air 2019  7:45 AM       Rehab Goal Summary     Row Name 19 1100 19 0930          Oral Nutrition/Hydration Goal 2 (SLP)    Barriers (Oral Nutrition/Hydration Goal 2, SLP)  No overt s/s pen/asp with TL via cup throughout session.  -KB  No overt s/s pen/asp with TL via cup throughout session.  -KB     Progress/Outcomes (Oral Nutrition/Hydration Goal 2, SLP)  good progress toward goal;goal ongoing  -KB  good progress toward goal;goal  ongoing  -KB        Words/Phrases/Sentences Goal 1 (SLP)    Progress (Ability to Contruct Words/Phrases/Sentences Goal 1, SLP)  with minimal cues (75-90%);with maximum cues (25-49%)  -KB  independently (over 90% accuracy)  -KB     Progress/Outcomes (Identify Objects and Pictures Goal 1, SLP)  goal ongoing  -KB  good progress toward goal;goal ongoing  -KB     Comment (Words/Phrases/Sentences Goal 1, SLP)  30% identifying named body parts, 80% following direct physical model  -KB  100% identifying pictured objects by name in fo2  -KB        Reading Comprehension of Basic Signs and Letters Goal 1 (SLP)    Progress (Reading Comprehension of Basic Signs and Letters Goal 1, SLP)  --  with 1:1 supervision/constant cues  -KB     Progress/Outcomes (Reading Comprehension of Basic Signs and Letters Goal 1, SLP)  --  goal ongoing  -KB     Comment (Reading Comprehension of Basic Signs and Letters Goal 1, SLP)  --  attempted matching 2-3 digit numbers to like number in fo4, patient completed with 0% accuracy across 5 attempts despite maximal cueing  -KB        Word Retrieval Skills Goal 1 (SLP)    Progress (Word Retrieval Skills Goal 1, SLP)  with moderate cues (50-74%);with maximum cues (25-49%)  -KB  --     Progress/Outcomes (Word Retrieval Goal 1, SLP)  goal ongoing  -KB  --     Comment (Word Retrieval Goal 1, SLP)  10%, 10%, 20% across 3 trials of reciting alphabet, able to follow svetlana but most letter names were inaccurate; 20%, 20%, 50% counting 1-10 with unison model, verbal cues faded on third attempt  -KB  --        Word Retrieval Skills Goal 2 (SLP)    Progress (Word Retrieval Skills Goal 2, SLP)  with maximum cues (25-49%);with 1:1 supervision/constant cues  -KB  --     Progress/Outcomes (Word Retrieval Goal 2, SLP)  goal ongoing  -KB  --     Comment (Word Retrieval Goal 2, SLP)  0% imitation of short words given object stimuli, 50% with maximal cueing including direct verbal model with visual cues for accurate sound  "production, approximations and semantic paraphasias noted  -KB  --        Graphic Expression of Shapes, Letters, Numbers Goal 1 (SLP)    Progress (Graphic Expression of Shapes, Letters, and Numbers Goal 1, SLP)  --  with moderate cues (50-74%)  -KB     Progress/Outcomes (Graphic Expression of Shapes, Letters, and Numbers Goal 1, SLP)  --  goal ongoing  -KB     Comment (Graphic Expression of Shapes, Letters, and Numbers Goal 1, SLP)  --  69% copying single letters, discontinued task when patient perseverated on writing \"k\" x4  -KB        Planning and Execution of Connected Speech Goal 1 (SLP)    Progress (Planning and Execution of Connected Speech Goal 1, SLP)  with maximum cues (25-49%)  -KB  --     Progress/Outcomes (Planning and Execution of Connected Speech Goal 1, SLP)  goal ongoing  -KB  --     Comment (Planning and Execution of Connected Speech Goal 1, SLP)  30% direct imitation of functional CV words, 80% with additional verbal modeling and visual cues, 50% approximations noted  -KB  --        Augmentative/Alternative Communication Objectives Goal 1 (SLP    Progress (Augmentative/Alternative Communication Goal 1, SLP)  with 1:1 supervision/constant cues  -KB  --     Progress/Outcomes (Augmentative/Alternative Communication Goal 1, SLP)  goal ongoing;progress slower than expected  -KB  --     Comment (Augmentative/Alternative Communication Goal 1, SLP)  Attempted use of basic picture communication board to understand patient's want/need in regards to refusing therapy in tx office; patient was unable to effectively communicate a want/need using pistures thought she looked at the page and appeared to be considering the options  -KB  --       User Key  (r) = Recorded By, (t) = Taken By, (c) = Cosigned By    Initials Name Provider Type Discipline    KB Bruton, Katherine L Speech and Language Pathologist SLP          Physical Therapy Education     Title: PT OT SLP Therapies (Not Started)     Topic: Physical Therapy " (In Progress)     Point: Mobility training (Done)     Learning Progress Summary           Patient Acceptance, E,TB, VU,NR by KELBY at 8/14/2019  3:15 PM    Nonacceptance, E,TB,D, NR by ENID at 8/5/2019 12:00 PM    Acceptance, E,D, NR by JK at 8/3/2019  1:35 PM    Acceptance, D, DU,NR by JK at 8/3/2019  8:56 AM                   Point: Home exercise program (In Progress)     Learning Progress Summary           Patient Nonacceptance, E,TB,D, NR by KP at 8/5/2019 12:00 PM                   Point: Precautions (In Progress)     Learning Progress Summary           Patient Acceptance, E, NR by MD at 8/13/2019 11:14 AM    Acceptance, E, NR by MD at 8/12/2019 10:45 AM    Acceptance, E, NR by MD at 8/10/2019 11:15 AM    Acceptance, E, NR by MD at 8/9/2019 11:12 AM    Acceptance, E, NR by MD at 8/8/2019 11:48 AM    Acceptance, E, NR by MD at 8/6/2019 10:41 AM    Acceptance, E, NR by MD at 8/2/2019 10:44 AM    Acceptance, E,D, NR by MD at 8/1/2019 12:16 PM                               User Key     Initials Effective Dates Name Provider Type Discipline     06/08/18 -  Poppy Rosa, PT Physical Therapist PT     04/03/18 -  Nicole Austin, PT Physical Therapist PT    MD 04/03/18 -  Mira Chadwick, PT Physical Therapist PT     04/03/18 -  Marycruz Schmitt, PT Physical Therapist PT                PT Recommendation and Plan           Time Calculation:   PT Charges     Row Name 08/14/19 1517 08/14/19 1516          Time Calculation    Start Time  1030  -KELBY  0900  -     Stop Time  1100  -KELBY  0930  -     Time Calculation (min)  30 min  -KELBY  30 min  -KELBY     PT Received On  --  08/14/19  -KELBY     PT - Next Appointment  --  08/15/19  -        Time Calculation- PT    Total Timed Code Minutes- PT  30 minute(s)  -KELBY  30 minute(s)  -       User Key  (r) = Recorded By, (t) = Taken By, (c) = Cosigned By    Initials Name Provider Type    Poppy Trevino, PT Physical Therapist        Therapy Charges for Today     Code Description  Service Date Service Provider Modifiers Qty    [id number]  PT THER PROC EA 15 MIN 8/14/2019 Poppy Rosa, PT GP 4               Poppy Rosa, PT  8/14/2019

## 2019-08-14 NOTE — PROGRESS NOTES
Inpatient Rehabilitation Plan of Care Note    Plan of Care  Care Plan Reviewed - Updates as Follows    Psychosocial    Performed Intervention(s)  Assist patient to express needs and concerns      Safety    Performed Intervention(s)  Bed alarm, wc alarm  Safety rounds  Items within reach      Body Systems    Performed Intervention(s)  Daily skin inspection      Sphincter Control    Performed Intervention(s)  Monitor intake and output  Encourage fluid intake  Elimination schedule    Signed by: Trupti Viramontes RN

## 2019-08-14 NOTE — PROGRESS NOTES
Inpatient Rehabilitation Functional Measures Assessment    Functional Measures  KATI Eating:  [facility] Grooming: [facility] Bathing:  [facility] Upper Body Dressing:  [facility] Lower Body Dressing:  [facility] Toileting:  [facility] Bladder Management  Level of Assistance:  [place]  Frequency/Number of Accidents this Shift:  [facility] Bowel Management  Level of Assistance: [place]  Frequency/Number of Accidents this Shift: [facility] Bed/Chair/Wheelchair Transfer:  [facility] Toilet Transfer:  [facility] Tub/Shower Transfer:  [place]    Previously Documented Mode of Locomotion at Discharge: Field  KATI Expected Mode of Locomotion at Discharge: [facility] Walk/Wheelchair:  [facility] Stairs:  [facility] Comprehension:  Auditory comprehension is the usual mode. Patient does not  comprehend complex/abstract information in their primary language without  assistance from a helper. Comprehension Score = 3, Moderate Prompting. Patient  comprehends basic daily needs or ideas 50-74% of the time. Patient requires  moderate/some prompting. Patient requires the following assistive device(s):  Glasses.  KATI Expression:  Non-vocal expression is the usual mode. Patient does not  express complex/abstract information in their primary language without a helper.  Expression Score = 3, Moderate Prompting. Patient expresses basic daily needs or  ideas 50-74% of the time. Patient requires moderate/some prompting. Patient  requires the following assistive device(s): Communication board/device.  KATI Social Interaction:  Activity was not observed.  KATI Problem Solving:  Activity was not observed.  KATI Memory:  Memory Score = 3, Moderate Prompting. Patient recognizes and  remembers 50-74% of the time. Patient requires moderate/some prompting  for  memory for the following: Disoriented to person, place, time or situation.    Therapy Mode Minutes  Occupational Therapy: Branch  Physical Therapy: Branch  Speech Language  Pathology:  Branch    Signed by: Nicanor Dimas RN

## 2019-08-14 NOTE — PROGRESS NOTES
Inpatient Rehabilitation Functional Measures Assessment    Functional Measures  KATI Eating:  [facility] Grooming: [facility] Bathing:  [facility] Upper Body Dressing:  [facility] Lower Body Dressing:  [facility] Toileting:  [facility] Bladder Management  Level of Assistance:  [place]  Frequency/Number of Accidents this Shift:  [facility] Bowel Management  Level of Assistance: [place]  Frequency/Number of Accidents this Shift: [facility] Bed/Chair/Wheelchair Transfer:  [facility] Toilet Transfer:  [facility] Tub/Shower Transfer:  [place]    Previously Documented Mode of Locomotion at Discharge: Field  KATI Expected Mode of Locomotion at Discharge: [facility] Walk/Wheelchair:  [facility] Stairs:  [facility] Comprehension:  Auditory comprehension is the usual mode. Patient does not  comprehend complex/abstract information in their primary language without  assistance from a helper. Comprehension Score = 1, Total Assistance.  Patient  understands basic daily needs less than 25% of the time and/or does not  understand simple information such as single words or gestures. No assistive  devices were required.  KATI Expression:  Non-vocal expression is the usual mode. Patient does not  express complex/abstract information in their primary language without a helper.  Expression Score = 1, Total Assistance. Patient expresses basic daily needs less  than 25% of the time, or does not express basic needs appropriately or  consistently despite prompting. Patient requires the following assistive  device(s): Communication board/device.  KATI Social Interaction:  Activity was not observed.  KATI Problem Solving:  Activity was not observed.  KATI Memory:  Memory Score = 2, Maximal Prompting. Patient recognizes and  remembers 25-49% of the time. Patient requires maximal/a lot of prompting (most  of the time) for memory for the following: Inability to follow multi-step  commands. pt aphasic, KRYSTYNA to assess what the patient  is trying to express to  staff. .    Therapy Mode Minutes  Occupational Therapy: Branch  Physical Therapy: Branch  Speech Language Pathology:  Branch    Signed by: Trupti Viramontes RN

## 2019-08-14 NOTE — THERAPY TREATMENT NOTE
Inpatient Rehabilitation - Speech Language Pathology Treatment Note    [facility]       Patient Name: Darby Workman  : 1945  MRN: [id number]    Today's Date: 2019           Admit Date: 2019      Visit Dx:      No diagnosis found.    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)   • Right-sided headache   • Acute ischemic stroke (CMS/HCC)   • Embolic stroke (CMS/HCC)   • Cerebral infarction due to stenosis of left carotid artery (CMS/HCC)   • Anticoagulated by anticoagulation treatment   • Stroke (cerebrum) (CMS/HCC)          Therapy Treatment    Evaluation/Coping    Evaluation/Treatment Time and Intent  Subjective Information: no complaints (19 0930 : Bruton, Katherine L)  Existing Precautions/Restrictions: fall(communication, swallow) (19 1100 : Bruton, Katherine L)  Document Type: therapy note (daily note) (19 1100 : Bruton, Katherine L)  Mode of Treatment: speech-language pathology (19 1100 : Bruton, Katherine L)  Patient/Family Observations: patient was not able to effectively communicate her wants/needs but refused to go to therapy office by planting her heels while rolling in wc down the [place]; tx session completed in her room (19 1100 : Bruton, Katherine L)  Start Time (Evaluation/Treatment): 1100 (19 1100 : Bruton, Katherine L)  Stop Time (Evaluation/Treatment): 1130 (19 1100 : Bruton, Katherine L)    Vitals/Pain/Safety    Pain Scale: Numbers Pre/Post-Treatment  Pain Scale: Numbers, Pretreatment: 0/10 - no pain (19 1100 : Bruton, Katherine L)  Pain Scale: Numbers, Post-Treatment: 0/10 - no pain (19 1100 : Bruton, Katherine L)    EDUCATION    The patient has been educated in the following areas:     Communication Impairment.    SLP Recommendation and Plan    SLP  Diagnosis: severe global aphasia, cognitive impairment    SLP Diagnosis: severe global aphasia, cognitive impairment    Rehab Potential/Prognosis: good         Anticipated Dischage Disposition: home with assist, anticipate therapy at next level of care              Predicted Duration Therapy Intervention (Days): until discharge           Plan of Care Reviewed With: patient      SLP GOALS     Row Name 08/14/19 1100 08/14/19 0930 08/13/19 1300       Oral Nutrition/Hydration Goal 2 (SLP)    Barriers (Oral Nutrition/Hydration Goal 2, SLP)  No overt s/s pen/asp with TL via cup throughout session.  -KB  No overt s/s pen/asp with TL via cup throughout session.  -KB  No overt s/s pen/asp with TL via cup throughout session.  -KB    Progress/Outcomes (Oral Nutrition/Hydration Goal 2, SLP)  good progress toward goal;goal ongoing  -KB  good progress toward goal;goal ongoing  -KB  good progress toward goal;goal ongoing  -KB       Words/Phrases/Sentences Goal 1 (SLP)    Progress (Ability to Contruct Words/Phrases/Sentences Goal 1, SLP)  with minimal cues (75-90%);with maximum cues (25-49%)  -KB  independently (over 90% accuracy)  -KB  with maximum cues (25-49%)  -KB    Progress/Outcomes (Identify Objects and Pictures Goal 1, SLP)  goal ongoing  -KB  good progress toward goal;goal ongoing  -KB  goal ongoing  -KB    Comment (Words/Phrases/Sentences Goal 1, SLP)  30% identifying named body parts, 80% following direct physical model  -KB  100% identifying pictured objects by name in fo2  -KB  40% identifying pictured objects by name in fo3; 30% pointing to named body parts, 50% givne 2 visual choices, 100% with direct model  -KB       Reading Comprehension of Basic Signs and Letters Goal 1 (SLP)    Progress (Reading Comprehension of Basic Signs and Letters Goal 1, SLP)  --  with 1:1 supervision/constant cues  -KB  --    Progress/Outcomes (Reading Comprehension of Basic Signs and Letters Goal 1, SLP)  --  goal ongoing  -KB  --     Comment (Reading Comprehension of Basic Signs and Letters Goal 1, SLP)  --  attempted matching 2-3 digit numbers to like number in fo4, patient completed with 0% accuracy across 5 attempts despite maximal cueing  -KB  --       Word Retrieval Skills Goal 1 (SLP)    Progress (Word Retrieval Skills Goal 1, SLP)  with moderate cues (50-74%);with maximum cues (25-49%)  -KB  --  with moderate cues (50-74%)  -KB    Progress/Outcomes (Word Retrieval Goal 1, SLP)  goal ongoing  -KB  --  good progress toward goal;goal ongoing  -KB    Comment (Word Retrieval Goal 1, SLP)  10%, 10%, 20% across 3 trials of reciting alphabet, able to follow svetlana but most letter names were inaccurate; 20%, 20%, 50% counting 1-10 with unison model, verbal cues faded on third attempt  -KB  --  increased attempts to sing along with alphabet given visual support and verbal model, 50% overall approximation across 3 trials, perseverations noted consistently, patient able to maintain svetlana of alphabet song  -KB       Word Retrieval Skills Goal 2 (SLP)    Progress (Word Retrieval Skills Goal 2, SLP)  with maximum cues (25-49%);with 1:1 supervision/constant cues  -KB  --  --    Progress/Outcomes (Word Retrieval Goal 2, SLP)  goal ongoing  -KB  --  --    Comment (Word Retrieval Goal 2, SLP)  0% imitation of short words given object stimuli, 50% with maximal cueing including direct verbal model with visual cues for accurate sound production, approximations and semantic paraphasias noted  -KB  --  --       Graphic Expression of Shapes, Letters, Numbers Goal 1 (SLP)    Progress (Graphic Expression of Shapes, Letters, and Numbers Goal 1, SLP)  --  with moderate cues (50-74%)  -KB  with moderate cues (50-74%)  -KB    Progress/Outcomes (Graphic Expression of Shapes, Letters, and Numbers Goal 1, SLP)  --  goal ongoing  -KB  goal ongoing  -KB    Comment (Graphic Expression of Shapes, Letters, and Numbers Goal 1, SLP)  --  69% copying single letters, discontinued  "task when patient perseverated on writing \"k\" x4  -KB  70% filling in blanks from written numbers 1-10 and alphabet A-N  -KB       Planning and Execution of Connected Speech Goal 1 (SLP)    Progress (Planning and Execution of Connected Speech Goal 1, SLP)  with maximum cues (25-49%)  -KB  --  --    Progress/Outcomes (Planning and Execution of Connected Speech Goal 1, SLP)  goal ongoing  -KB  --  --    Comment (Planning and Execution of Connected Speech Goal 1, SLP)  30% direct imitation of functional CV words, 80% with additional verbal modeling and visual cues, 50% approximations noted  -KB  --  --       Augmentative/Alternative Communication Objectives Goal 1 (SLP    Progress (Augmentative/Alternative Communication Goal 1, SLP)  with 1:1 supervision/constant cues  -KB  --  --    Progress/Outcomes (Augmentative/Alternative Communication Goal 1, SLP)  goal ongoing;progress slower than expected  -KB  --  --    Comment (Augmentative/Alternative Communication Goal 1, SLP)  Attempted use of basic picture communication board to understand patient's want/need in regards to refusing therapy in tx office; patient was unable to effectively communicate a want/need using pistures thought she looked at the page and appeared to be considering the options  -KB  --  --    Row Name 08/13/19 0930 08/12/19 1300 08/12/19 0930       Words/Phrases/Sentences Goal 1 (SLP)    Barriers (Identify Objects and Pictures Goal 1, SLP)  --  --  poor effort this date, agitated  -KB    Progress (Ability to Contruct Words/Phrases/Sentences Goal 1, SLP)  --  with minimal cues (75-90%)  -KB  with 1:1 supervision/constant cues  -KB    Progress/Outcomes (Identify Objects and Pictures Goal 1, SLP)  --  good progress toward goal;goal ongoing  -KB  goal ongoing  -KB    Comment (Words/Phrases/Sentences Goal 1, SLP)  --  80% indetifying named picture in fo2  -KB  attempted identification of pictured in fo2, patient tapped both pictures with both hands on 3/3 " trials despite maximal cues including [place] assist initially  -KB       Comprehend Questions Goal 1 (SLP)    Progress (Ability to Comprehend Questions Goal 1, SLP)  with maximum cues (25-49%);with 1:1 supervision/constant cues  -KB  --  --    Progress/Outcomes (Comprehend Questions Goal 1, SLP)  goal ongoing  -KB  --  --    Comment (Comprehend Questions Goal 1, SLP)  20% answering basic yes/no questions presented verbally with written answer choices, 40% with maximal cues  -KB  --  --       Reading Comprehension of Basic Signs and Letters Goal 1 (SLP)    Progress (Reading Comprehension of Basic Signs and Letters Goal 1, SLP)  independently (over 90% accuracy)  -KB  --  --    Progress/Outcomes (Reading Comprehension of Basic Signs and Letters Goal 1, SLP)  good progress toward goal;goal ongoing  -KB  --  --    Comment (Reading Comprehension of Basic Signs and Letters Goal 1, SLP)  100% matching number to like number in fo4  -KB  --  --       Word Retrieval Skills Goal 1 (SLP)    Progress (Word Retrieval Skills Goal 1, SLP)  with moderate cues (50-74%);with maximum cues (25-49%)  -KB  with 1:1 supervision/constant cues  -KB  --    Progress/Outcomes (Word Retrieval Goal 1, SLP)  goal ongoing  -KB  goal ongoing  -KB  --    Comment (Word Retrieval Goal 1, SLP)  50%, 30%, 40% counting 1-10 with visual support and direct verbal model across 3 trials respectively  -KB  10% naming of pictured objects, 60% with direct verbal/visual model  -KB  --       Graphic Expression of Words Goal 1 (SLP)    Progress (Graphic Expression of Single Words Goal 1, SLP)  independently (over 90% accuracy)  -KB  --  --    Progress/Outcomes (Graphic Expression of Single Words Goal 1, SLP)  good progress toward goal;goal ongoing  -KB  --  --    Comment (Graphic Expression of Single Words Goal 1, SLP)  100% accuracy copying her first and last name as well as 3 letter words (x6), written letter approximations noted, but considered legible with written  "model present  -KB  --  --       Planning and Execution of Connected Speech Goal 1 (SLP)    Barriers (Planning and Execution of Connected Speech Goal 1, SLP)  --  --  poor effort this date, agitated  -KB    Progress (Planning and Execution of Connected Speech Goal 1, SLP)  with maximum cues (25-49%)  -KB  with maximum cues (25-49%)  -KB  with 1:1 supervision/constant cues  -KB    Progress/Outcomes (Planning and Execution of Connected Speech Goal 1, SLP)  goal ongoing  -KB  goal ongoing  -KB  goal ongoing  -KB    Comment (Planning and Execution of Connected Speech Goal 1, SLP)  90% imitation of functional CV words, 20% approximations  -KB  40% imitation of functional CV words  -KB  0% imitating functional CV words despite maximal cues, patient pointed to her face each trial but no verbalizations produced  -KB       Augmentative/Alternative Communication Objectives Goal 1 (SLP    Progress (Augmentative/Alternative Communication Goal 1, SLP)  --  --  with 1:1 supervision/constant cues  -KB    Progress/Outcomes (Augmentative/Alternative Communication Goal 1, SLP)  --  --  goal ongoing  -KB    Comment (Augmentative/Alternative Communication Goal 1, SLP)  --  --  Patient perseveratively pointed to \"phone\" icon on basic communication picture board and reached for office phone. She is not able to use the phone appropriately at this time, so I told her I would have nursing staff try to call family after her session. She was not able to be redirected from trying to communicate something using communication picture boards, but she continued pointing around randomly to various items including nurse, blanket, toilet then when ashed if she needed these items would reaspond \"no\".   -KB       Additional Goal 1 (SLP)    Barriers (Additional Goal 1, SLP)  patient able to complete 0/2 trials accurately on attempted basic clock reading task with 3 written choices of given time, max cues not effective and patient became frustrated with " task so it was discontinued  -KB  80% matching like pictures from one side of the page to the other in fo5  -KB  --    Progress/Outcomes (Additional Goal 1, SLP)  goal ongoing  -KB  good progress toward goal;goal ongoing  -KB  --      User Key  (r) = Recorded By, (t) = Taken By, (c) = Cosigned By    Initials Name Provider Type    KB Bruton, Katherine L Speech and Language Pathologist                  Time Calculation:       Time Calculation- SLP     Row Name 19 1130 19 1000          Time Calculation- SLP    SLP Start Time  1100  -KB  0930  -KB     SLP Stop Time  1130  -KB  1000  -KB     SLP Time Calculation (min)  30 min  -KB  30 min  -KB     SLP Received On  19  -KB  19  -KB       User Key  (r) = Recorded By, (t) = Taken By, (c) = Cosigned By    Initials Name Provider Type    KB Bruton, Katherine L Speech and Language Pathologist            Therapy Charges for Today     Code Description Service Date Service Provider Modifiers Qty    91435332203 HC ST TREATMENT SWALLOW 1 2019 Bruton, Katherine L GN 1    [id number] HC ST TREATMENT SPEECH 3 2019 Bruton, Katherine L GN 1    [id number] HC ST TREATMENT SPEECH 3 2019 Bruton, Katherine L GN 1    [id number] HC ST TREATMENT SWALLOW 1 2019 Bruton, Katherine L GN 1                           Katherine L Bruton  2019     and Inpatient Rehabilitation - [facility] Speech Language Pathology   Swallow Treatment Note/Discharge   [facility]     Patient Name: Darby Workman  : 1945  MRN: [id number]  Today's Date: 2019               Admit Date: 2019    Visit Dx:    No diagnosis found.  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)   • Right-sided headache   • Acute ischemic stroke (CMS/HCC)   • Embolic stroke (CMS/HCC)   • Cerebral infarction due to  stenosis of left carotid artery (CMS/HCC)   • Anticoagulated by anticoagulation treatment   • Stroke (cerebrum) (CMS/HCC)       Therapy Treatment    Evaluation/Coping    Evaluation/Treatment Time and Intent  Subjective Information: no complaints (08/14/19 0930 : Bruton, Katherine L)  Existing Precautions/Restrictions: fall(communication, swallow) (08/14/19 1100 : Bruton, Katherine L)  Document Type: therapy note (daily note) (08/14/19 1100 : Bruton, Katherine L)  Mode of Treatment: speech-language pathology (08/14/19 1100 : Bruton, Katherine L)  Patient/Family Observations: patient was not able to effectively communicate her wants/needs but refused to go to therapy office by planting her heels while rolling in wc down the [place]; tx session completed in her room (08/14/19 1100 : Bruton, Katherine L)  Start Time (Evaluation/Treatment): 1100 (08/14/19 1100 : Bruton, Katherine L)  Stop Time (Evaluation/Treatment): 1130 (08/14/19 1100 : Bruton, Katherine L)    Vitals/Pain/Safety    Pain Scale: Numbers Pre/Post-Treatment  Pain Scale: Numbers, Pretreatment: 0/10 - no pain (08/14/19 1100 : Bruton, Katherine L)  Pain Scale: Numbers, Post-Treatment: 0/10 - no pain (08/14/19 1100 : Bruton, Katherine L)    SLP GOALS     Row Name 08/14/19 1100 08/14/19 0930 08/13/19 1300       Oral Nutrition/Hydration Goal 2 (SLP)    Barriers (Oral Nutrition/Hydration Goal 2, SLP)  No overt s/s pen/asp with TL via cup throughout session.  -KB  No overt s/s pen/asp with TL via cup throughout session.  -KB  No overt s/s pen/asp with TL via cup throughout session.  -KB    Progress/Outcomes (Oral Nutrition/Hydration Goal 2, SLP)  good progress toward goal;goal ongoing  -KB  good progress toward goal;goal ongoing  -KB  good progress toward goal;goal ongoing  -KB       Words/Phrases/Sentences Goal 1 (SLP)    Progress (Ability to Contruct Words/Phrases/Sentences Goal 1, SLP)  with minimal cues (75-90%);with maximum cues (25-49%)  -KB  independently  (over 90% accuracy)  -KB  with maximum cues (25-49%)  -KB    Progress/Outcomes (Identify Objects and Pictures Goal 1, SLP)  goal ongoing  -KB  good progress toward goal;goal ongoing  -KB  goal ongoing  -KB    Comment (Words/Phrases/Sentences Goal 1, SLP)  30% identifying named body parts, 80% following direct physical model  -KB  100% identifying pictured objects by name in fo2  -KB  40% identifying pictured objects by name in fo3; 30% pointing to named body parts, 50% givne 2 visual choices, 100% with direct model  -KB       Reading Comprehension of Basic Signs and Letters Goal 1 (SLP)    Progress (Reading Comprehension of Basic Signs and Letters Goal 1, SLP)  --  with 1:1 supervision/constant cues  -KB  --    Progress/Outcomes (Reading Comprehension of Basic Signs and Letters Goal 1, SLP)  --  goal ongoing  -KB  --    Comment (Reading Comprehension of Basic Signs and Letters Goal 1, SLP)  --  attempted matching 2-3 digit numbers to like number in fo4, patient completed with 0% accuracy across 5 attempts despite maximal cueing  -KB  --       Word Retrieval Skills Goal 1 (SLP)    Progress (Word Retrieval Skills Goal 1, SLP)  with moderate cues (50-74%);with maximum cues (25-49%)  -KB  --  with moderate cues (50-74%)  -KB    Progress/Outcomes (Word Retrieval Goal 1, SLP)  goal ongoing  -KB  --  good progress toward goal;goal ongoing  -KB    Comment (Word Retrieval Goal 1, SLP)  10%, 10%, 20% across 3 trials of reciting alphabet, able to follow svetlana but most letter names were inaccurate; 20%, 20%, 50% counting 1-10 with unison model, verbal cues faded on third attempt  -KB  --  increased attempts to sing along with alphabet given visual support and verbal model, 50% overall approximation across 3 trials, perseverations noted consistently, patient able to maintain svetlana of alphabet song  -KB       Word Retrieval Skills Goal 2 (SLP)    Progress (Word Retrieval Skills Goal 2, SLP)  with maximum cues (25-49%);with 1:1  "supervision/constant cues  -KB  --  --    Progress/Outcomes (Word Retrieval Goal 2, SLP)  goal ongoing  -KB  --  --    Comment (Word Retrieval Goal 2, SLP)  0% imitation of short words given object stimuli, 50% with maximal cueing including direct verbal model with visual cues for accurate sound production, approximations and semantic paraphasias noted  -KB  --  --       Graphic Expression of Shapes, Letters, Numbers Goal 1 (SLP)    Progress (Graphic Expression of Shapes, Letters, and Numbers Goal 1, SLP)  --  with moderate cues (50-74%)  -KB  with moderate cues (50-74%)  -KB    Progress/Outcomes (Graphic Expression of Shapes, Letters, and Numbers Goal 1, SLP)  --  goal ongoing  -KB  goal ongoing  -KB    Comment (Graphic Expression of Shapes, Letters, and Numbers Goal 1, SLP)  --  69% copying single letters, discontinued task when patient perseverated on writing \"k\" x4  -KB  70% filling in blanks from written numbers 1-10 and alphabet A-N  -KB       Planning and Execution of Connected Speech Goal 1 (SLP)    Progress (Planning and Execution of Connected Speech Goal 1, SLP)  with maximum cues (25-49%)  -KB  --  --    Progress/Outcomes (Planning and Execution of Connected Speech Goal 1, SLP)  goal ongoing  -KB  --  --    Comment (Planning and Execution of Connected Speech Goal 1, SLP)  30% direct imitation of functional CV words, 80% with additional verbal modeling and visual cues, 50% approximations noted  -KB  --  --       Augmentative/Alternative Communication Objectives Goal 1 (SLP    Progress (Augmentative/Alternative Communication Goal 1, SLP)  with 1:1 supervision/constant cues  -KB  --  --    Progress/Outcomes (Augmentative/Alternative Communication Goal 1, SLP)  goal ongoing;progress slower than expected  -KB  --  --    Comment (Augmentative/Alternative Communication Goal 1, SLP)  Attempted use of basic picture communication board to understand patient's want/need in regards to refusing therapy in tx office; " patient was unable to effectively communicate a want/need using pistures thought she looked at the page and appeared to be considering the options  -KB  --  --    Row Name 08/13/19 0930 08/12/19 1300 08/12/19 0930       Words/Phrases/Sentences Goal 1 (SLP)    Barriers (Identify Objects and Pictures Goal 1, SLP)  --  --  poor effort this date, agitated  -KB    Progress (Ability to Contruct Words/Phrases/Sentences Goal 1, SLP)  --  with minimal cues (75-90%)  -KB  with 1:1 supervision/constant cues  -KB    Progress/Outcomes (Identify Objects and Pictures Goal 1, SLP)  --  good progress toward goal;goal ongoing  -KB  goal ongoing  -KB    Comment (Words/Phrases/Sentences Goal 1, SLP)  --  80% indetifying named picture in fo2  -KB  attempted identification of pictured in fo2, patient tapped both pictures with both hands on 3/3 trials despite maximal cues including [place] assist initially  -KB       Comprehend Questions Goal 1 (SLP)    Progress (Ability to Comprehend Questions Goal 1, SLP)  with maximum cues (25-49%);with 1:1 supervision/constant cues  -KB  --  --    Progress/Outcomes (Comprehend Questions Goal 1, SLP)  goal ongoing  -KB  --  --    Comment (Comprehend Questions Goal 1, SLP)  20% answering basic yes/no questions presented verbally with written answer choices, 40% with maximal cues  -KB  --  --       Reading Comprehension of Basic Signs and Letters Goal 1 (SLP)    Progress (Reading Comprehension of Basic Signs and Letters Goal 1, SLP)  independently (over 90% accuracy)  -KB  --  --    Progress/Outcomes (Reading Comprehension of Basic Signs and Letters Goal 1, SLP)  good progress toward goal;goal ongoing  -KB  --  --    Comment (Reading Comprehension of Basic Signs and Letters Goal 1, SLP)  100% matching number to like number in fo4  -KB  --  --       Word Retrieval Skills Goal 1 (SLP)    Progress (Word Retrieval Skills Goal 1, SLP)  with moderate cues (50-74%);with maximum cues (25-49%)  -KB  with 1:1  supervision/constant cues  -KB  --    Progress/Outcomes (Word Retrieval Goal 1, SLP)  goal ongoing  -KB  goal ongoing  -KB  --    Comment (Word Retrieval Goal 1, SLP)  50%, 30%, 40% counting 1-10 with visual support and direct verbal model across 3 trials respectively  -KB  10% naming of pictured objects, 60% with direct verbal/visual model  -KB  --       Graphic Expression of Words Goal 1 (SLP)    Progress (Graphic Expression of Single Words Goal 1, SLP)  independently (over 90% accuracy)  -KB  --  --    Progress/Outcomes (Graphic Expression of Single Words Goal 1, SLP)  good progress toward goal;goal ongoing  -KB  --  --    Comment (Graphic Expression of Single Words Goal 1, SLP)  100% accuracy copying her first and last name as well as 3 letter words (x6), written letter approximations noted, but considered legible with written model present  -KB  --  --       Planning and Execution of Connected Speech Goal 1 (SLP)    Barriers (Planning and Execution of Connected Speech Goal 1, SLP)  --  --  poor effort this date, agitated  -KB    Progress (Planning and Execution of Connected Speech Goal 1, SLP)  with maximum cues (25-49%)  -KB  with maximum cues (25-49%)  -KB  with 1:1 supervision/constant cues  -KB    Progress/Outcomes (Planning and Execution of Connected Speech Goal 1, SLP)  goal ongoing  -KB  goal ongoing  -KB  goal ongoing  -KB    Comment (Planning and Execution of Connected Speech Goal 1, SLP)  90% imitation of functional CV words, 20% approximations  -KB  40% imitation of functional CV words  -KB  0% imitating functional CV words despite maximal cues, patient pointed to her face each trial but no verbalizations produced  -KB       Augmentative/Alternative Communication Objectives Goal 1 (SLP    Progress (Augmentative/Alternative Communication Goal 1, SLP)  --  --  with 1:1 supervision/constant cues  -KB    Progress/Outcomes (Augmentative/Alternative Communication Goal 1, SLP)  --  --  goal ongoing  -KB     "Comment (Augmentative/Alternative Communication Goal 1, SLP)  --  --  Patient perseveratively pointed to \"phone\" icon on basic communication picture board and reached for office phone. She is not able to use the phone appropriately at this time, so I told her I would have nursing staff try to call family after her session. She was not able to be redirected from trying to communicate something using communication picture boards, but she continued pointing around randomly to various items including nurse, blanket, toilet then when ashed if she needed these items would reaspond \"no\".   -KB       Additional Goal 1 (SLP)    Barriers (Additional Goal 1, SLP)  patient able to complete 0/2 trials accurately on attempted basic clock reading task with 3 written choices of given time, max cues not effective and patient became frustrated with task so it was discontinued  -KB  80% matching like pictures from one side of the page to the other in fo5  -KB  --    Progress/Outcomes (Additional Goal 1, SLP)  goal ongoing  -KB  good progress toward goal;goal ongoing  -KB  --      User Key  (r) = Recorded By, (t) = Taken By, (c) = Cosigned By    Initials Name Provider Type    KB Bruton, Katherine L Speech and Language Pathologist        EDUCATION  The patient has been educated in the following areas:   Dysphagia (Swallowing Impairment) Modified Diet Instruction.    Time Calculation:   Time Calculation- SLP     Row Name 08/14/19 1130 08/14/19 1000          Time Calculation- SLP    SLP Start Time  1100  -KB  0930  -KB     SLP Stop Time  1130  -KB  1000  -KB     SLP Time Calculation (min)  30 min  -KB  30 min  -KB     SLP Received On  08/14/19  -KB  08/14/19  -KB       User Key  (r) = Recorded By, (t) = Taken By, (c) = Cosigned By    Initials Name Provider Type    KB Bruton, Katherine L Speech and Language Pathologist          Therapy Charges for Today     Code Description Service Date Service Provider Modifiers Qty    58226200580  ST " TREATMENT SWALLOW 1 8/13/2019 Bruton, Katherine L GN 1    84579776729 HC ST TREATMENT SPEECH 3 8/13/2019 Bruton, Katherine L GN 1    [id number] HC ST TREATMENT SPEECH 3 8/14/2019 Bruton, Katherine L GN 1    [id number] HC ST TREATMENT SWALLOW 1 8/14/2019 Bruton, Katherine L GN 1               SLP Discharge Summary  Anticipated Dischage Disposition: home with assist, anticipate therapy at next level of care    Katherine L Bruton  8/14/2019

## 2019-08-14 NOTE — PLAN OF CARE
Problem: Patient Care Overview  Goal: Plan of Care Review  Outcome: Ongoing (interventions implemented as appropriate)   08/14/19 1600   Patient Care Overview   IRF Plan of Care Review progress ongoing, continue   OTHER   Outcome Summary pt alert and aphasic. frustrated, agitated at times d/t the aphasia. impulsive at times. no indication of pain. meds crushed. continent. no neuro changes. will continue to monitor.

## 2019-08-14 NOTE — PROGRESS NOTES
Pt'  is very upset at the prospect of pt being discharged from the rehab unit.  Explained that her recovery would take place over many months and could be accomplished at a lesser level of care.  Home care with close supervision and continued therapy vs subacute placement discussed.  's preference is for pt to remain on the rehab unit. Explained that long term, this is not and option.   is grieving and trying to provide what he believes to be the best care for his wife. Family conference planned for tomorrow with pt,  and daughter.

## 2019-08-14 NOTE — PROGRESS NOTES
Inpatient Rehabilitation Functional Measures Assessment    Functional Measures  KATI Eating:  [facility] Grooming: [facility] Bathing:  [facility] Upper Body Dressing:  [facility] Lower Body Dressing:  [facility] Toileting:  [facility] Bladder Management  Level of Assistance:  [place]  Frequency/Number of Accidents this Shift:  [facility] Bowel Management  Level of Assistance: [place]  Frequency/Number of Accidents this Shift: [place]    [facility] Bed/Chair/Wheelchair Transfer:  Bed/chair/wheelchair Transfer Score = 5.  Patient is supervision/set-up for transferring to and from the  bed/chair/wheelchair, requiring: Stand by assistance. No assistive devices were  required.  KATI Toilet Transfer:  [facility] Tub/Shower Transfer:  [place]    Previously Documented Mode of Locomotion at Discharge: Field  KATI Expected Mode of Locomotion at Discharge: [facility] Walk/Wheelchair:  WHEELCHAIR OBSERVATION   Activity was not observed.    WALK OBSERVATION   Walk Distance Scale = 3.  Distance walked is greater than 150 feet. Walk Score  = 4.  Patient performs 75% or more of effort and requires minimal assistance.  Incidental help/contact guard/steadying was provided. Patient walked a distance  of  300 feet. No assistive devices were required.  KATI Stairs:  Stairs Score = 2.  Incidental assistance with lifting or lowering,  contact guard or steadying was provided. Patient performs 75% or more of effort  and requires minimal contact assistance. Patient negotiated  4 stairs. Patient  requires the following assistive device(s): Handrail(s).    KATI Comprehension:  [facility] Expression:  [facility] Social Interaction:  [facility] Problem Solving:  [facility] Memory:  [place]    Therapy Mode Minutes  Occupational Therapy: [place]  Physical Therapy: Individual: 60 minutes.  Speech Language Pathology:  [place]    Signed by: Poppy Rosa PT

## 2019-08-14 NOTE — PROGRESS NOTES
Inpatient Rehabilitation Plan of Care Note    Plan of Care  Care Plan Reviewed - No updates at this time.    Safety    Performed Intervention(s)  Bed alarm, wc alarm  Safety rounds  Items within reach      Sphincter Control    Performed Intervention(s)  Monitor intake and output  Encourage fluid intake  Elimination schedule    Signed by: Nicanor Dimas RN

## 2019-08-15 ENCOUNTER — APPOINTMENT (OUTPATIENT)
Dept: GENERAL RADIOLOGY | Facility: HOSPITAL | Age: 74
End: 2019-08-15

## 2019-08-15 LAB
GLUCOSE BLDC GLUCOMTR-MCNC: 146 MG/DL (ref 70–130)
GLUCOSE BLDC GLUCOMTR-MCNC: 148 MG/DL (ref 70–130)
GLUCOSE BLDC GLUCOMTR-MCNC: 245 MG/DL (ref 70–130)
GLUCOSE BLDC GLUCOMTR-MCNC: 68 MG/DL (ref 70–130)
GLUCOSE BLDC GLUCOMTR-MCNC: 70 MG/DL (ref 70–130)

## 2019-08-15 PROCEDURE — 97112 NEUROMUSCULAR REEDUCATION: CPT

## 2019-08-15 PROCEDURE — 74230 X-RAY XM SWLNG FUNCJ C+: CPT

## 2019-08-15 PROCEDURE — 97535 SELF CARE MNGMENT TRAINING: CPT

## 2019-08-15 PROCEDURE — 92611 MOTION FLUOROSCOPY/SWALLOW: CPT | Performed by: SPEECH-LANGUAGE PATHOLOGIST

## 2019-08-15 PROCEDURE — 63710000001 INSULIN REGULAR HUMAN PER 5 UNITS: Performed by: PHYSICAL MEDICINE & REHABILITATION

## 2019-08-15 PROCEDURE — 97110 THERAPEUTIC EXERCISES: CPT

## 2019-08-15 PROCEDURE — 82962 GLUCOSE BLOOD TEST: CPT

## 2019-08-15 RX ORDER — FLUOXETINE 10 MG/1
10 CAPSULE ORAL DAILY
Status: DISCONTINUED | OUTPATIENT
Start: 2019-08-16 | End: 2019-08-15

## 2019-08-15 RX ADMIN — APIXABAN 5 MG: 5 TABLET, FILM COATED ORAL at 21:23

## 2019-08-15 RX ADMIN — DORZOLAMIDE HYDROCHLORIDE 1 DROP: 20 SOLUTION/ DROPS OPHTHALMIC at 07:44

## 2019-08-15 RX ADMIN — HYDRALAZINE HYDROCHLORIDE 50 MG: 50 TABLET, FILM COATED ORAL at 06:11

## 2019-08-15 RX ADMIN — INSULIN HUMAN 5 UNITS: 100 INJECTION, SOLUTION PARENTERAL at 12:17

## 2019-08-15 RX ADMIN — POTASSIUM CHLORIDE 20 MEQ: 1.5 POWDER, FOR SOLUTION ORAL at 07:44

## 2019-08-15 RX ADMIN — APIXABAN 5 MG: 5 TABLET, FILM COATED ORAL at 07:46

## 2019-08-15 RX ADMIN — MEMANTINE HYDROCHLORIDE 5 MG: 5 TABLET, FILM COATED ORAL at 07:45

## 2019-08-15 RX ADMIN — LANSOPRAZOLE 30 MG: KIT at 06:11

## 2019-08-15 RX ADMIN — HYDRALAZINE HYDROCHLORIDE 50 MG: 50 TABLET, FILM COATED ORAL at 21:22

## 2019-08-15 RX ADMIN — ASPIRIN 325 MG: 325 TABLET ORAL at 07:46

## 2019-08-15 RX ADMIN — BARIUM SULFATE 4 ML: 980 POWDER, FOR SUSPENSION ORAL at 10:12

## 2019-08-15 RX ADMIN — NYSTATIN 500000 UNITS: 100000 SUSPENSION ORAL at 21:22

## 2019-08-15 RX ADMIN — NYSTATIN 500000 UNITS: 100000 SUSPENSION ORAL at 07:47

## 2019-08-15 RX ADMIN — NEBIVOLOL HYDROCHLORIDE 20 MG: 10 TABLET ORAL at 07:45

## 2019-08-15 RX ADMIN — HYDRALAZINE HYDROCHLORIDE 50 MG: 50 TABLET, FILM COATED ORAL at 13:19

## 2019-08-15 RX ADMIN — METFORMIN HYDROCHLORIDE 1000 MG: 1000 TABLET ORAL at 17:26

## 2019-08-15 RX ADMIN — NEBIVOLOL HYDROCHLORIDE 20 MG: 10 TABLET ORAL at 21:23

## 2019-08-15 RX ADMIN — BRIMONIDINE TARTRATE 1 DROP: 2 SOLUTION OPHTHALMIC at 21:22

## 2019-08-15 RX ADMIN — GLIPIZIDE 5 MG: 5 TABLET ORAL at 17:26

## 2019-08-15 RX ADMIN — NYSTATIN 500000 UNITS: 100000 SUSPENSION ORAL at 17:26

## 2019-08-15 RX ADMIN — AMLODIPINE BESYLATE 5 MG: 5 TABLET ORAL at 21:23

## 2019-08-15 RX ADMIN — NYSTATIN 500000 UNITS: 100000 SUSPENSION ORAL at 12:17

## 2019-08-15 RX ADMIN — LANSOPRAZOLE 30 MG: KIT at 17:27

## 2019-08-15 RX ADMIN — METFORMIN HYDROCHLORIDE 1000 MG: 1000 TABLET ORAL at 07:46

## 2019-08-15 RX ADMIN — DORZOLAMIDE HYDROCHLORIDE 1 DROP: 20 SOLUTION/ DROPS OPHTHALMIC at 21:22

## 2019-08-15 RX ADMIN — ATORVASTATIN CALCIUM 80 MG: 80 TABLET, FILM COATED ORAL at 21:23

## 2019-08-15 RX ADMIN — BARIUM SULFATE 65 ML: 960 POWDER, FOR SUSPENSION ORAL at 10:12

## 2019-08-15 RX ADMIN — ERGOCALCIFEROL 50000 UNITS: 1.25 CAPSULE ORAL at 13:41

## 2019-08-15 RX ADMIN — GLIPIZIDE 5 MG: 5 TABLET ORAL at 06:11

## 2019-08-15 RX ADMIN — LOSARTAN POTASSIUM: 50 TABLET, FILM COATED ORAL at 07:45

## 2019-08-15 RX ADMIN — BRIMONIDINE TARTRATE 1 DROP: 2 SOLUTION OPHTHALMIC at 07:44

## 2019-08-15 NOTE — PROGRESS NOTES
Inpatient Rehabilitation Functional Measures Assessment    Functional Measures  KATI Eating:  [facility] Grooming: [facility] Bathing:  [facility] Upper Body Dressing:  [facility] Lower Body Dressing:  [facility] Toileting:  [facility] Bladder Management  Level of Assistance:  [place]  Frequency/Number of Accidents this Shift:  [facility] Bowel Management  Level of Assistance: [place]  Frequency/Number of Accidents this Shift: [facility] Bed/Chair/Wheelchair Transfer:  [facility] Toilet Transfer:  [facility] Tub/Shower Transfer:  [place]    Previously Documented Mode of Locomotion at Discharge: Field  KATI Expected Mode of Locomotion at Discharge: [facility] Walk/Wheelchair:  [facility] Stairs:  [facility] Comprehension:  Both ( auditory and visual) modes of comprehension are used  equally. Patient does not comprehend complex/abstract information in their  primary language without assistance from a helper. Comprehension Score = 3,  Moderate Prompting. Patient comprehends basic daily needs or ideas 50-74% of the  time. Patient requires moderate/some prompting. Patient requires the following  assistive device(s): Glasses.  KATI Expression:  Non-vocal expression is the usual mode. Patient does not  express complex/abstract information in their primary language without a helper.  Expression Score = 2, Maximal Prompting. Patient expresses basic daily needs  25-49% of the time. Patient uses only single words or gestures and requires  maximal/a lot of prompting (most of the time). No assistive devices were  required.  KATI Social Interaction:  Social Interaction Score = 6, Modified Independent.  Patient is modified independent for social interaction, requiring: Requires  additional time. Requires a structured or modified environment.  KATI Problem Solving:  Activity was not observed.  KATI Memory:  Activity was not observed.    Therapy Mode Minutes  Occupational Therapy: [place]  Physical Therapy: [place]  Speech  Language Pathology:  [place]    Signed by: Hina Hudson RN

## 2019-08-15 NOTE — PLAN OF CARE
Problem: Skin Injury Risk (Adult)  Goal: Skin Health and Integrity  Outcome: Ongoing (interventions implemented as appropriate)      Problem: Fall Risk (Adult)  Goal: Absence of Fall  Outcome: Ongoing (interventions implemented as appropriate)      Problem: Patient Care Overview  Goal: Plan of Care Review  Outcome: Ongoing (interventions implemented as appropriate)   08/15/19 0303   Patient Care Overview   IRF Plan of Care Review progress ongoing, continue   Progress, Functional Goals demonstrating adequate progress   Coping/Psychosocial   Plan of Care Reviewed With patient   OTHER   Outcome Summary Pt rested well this shift. No s/s of irritabilty or impulsivity. Meds crushed in purree.        Problem: Stroke (IRF) (Adult)  Goal: Promote Optimal Functional [place]  Outcome: Ongoing (interventions implemented as appropriate)

## 2019-08-15 NOTE — PROGRESS NOTES
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    Again appears anxious related to her aphasia and difficulty expressing herself.    Does not indicate any pain complaints.  She will be able to get occasional single word for the examiner.  She does follow instructions.  The patient was reviewed with .  Discussed adding on an antidepressant with anxiolytic properties -Paxil-as it appears frustration from her aphasia with associated anxiety with the frustration affects her performance.  We will plan on starting Paxil 10 mg daily tomorrow and monitor response.  Reviewed with the patient's  that would not add a short acting anxiolytic (such as a benzodiazepine or Seroquel) as it may affect her cognitive performance.      Video fluoroscopic swallow study today-mechanical soft, no mixed consistency, nectar thick liquid, water protocol between meals, no straws.  History taken from: patient chart RN    Objective     Vital Signs  Temp:  [97.2 °F (36.2 °C)-98.6 °F (37 °C)] 97.2 °F (36.2 °C)  Heart Rate:  [66-74] 71  Resp:  [18] 18  BP: (127-160)/(59-74) 127/59    Objective  Physical Exam  MENTAL STATUS -  AWAKE / ALERT.  Anxious.  HEENT- NCAT,   SCLERA NON-ICTERIC, CONJUNCTIVA PINK, OP MOIST, NO JVD, EARS UNREMARKABLE EXTERNALLY  LUNGS - normal respirations.  Clear to auscultation  HEART- RRR   ABD -     EXT - NO EDEMA  OR CYANOSIS  NEURO -alert.  Aphasia.  She will get occasional single word but largely utterances. Rare automatic phrase  She will occasionally follow some simple commands at times, but inconsistent and requires much repetition...        MOTOR EXAM -takes resistance bilaterally.         Results Review:     I reviewed the patient's new clinical results.     CT HEAD - AUGUST 11, 2019  FINDINGS:  Old appearing lacunar type infarcts are again noted about the  right thalamus and basal ganglia regions. There are stable areas of  encephalomalacia in the left parietal and occipital lobes medially.  Generalized atrophy. There are moderately extensive scattered areas of  decreased density in the white matter likely related to chronic ischemic  gliotic changes.    No hydrocephalus.    Glucose   Date/Time Value Ref Range Status   08/15/2019 1616 68 (L) 70 - 130 mg/dL Final   08/15/2019 1057 245 (H) 70 - 130 mg/dL Final   08/15/2019 0720 148 (H) 70 - 130 mg/dL Final   08/14/2019 2103 69 (L) 70 - 130 mg/dL Final   08/14/2019 1556 187 (H) 70 - 130 mg/dL Final   08/14/2019 1058 114 70 - 130 mg/dL Final   08/14/2019 0720 154 (H) 70 - 130 mg/dL Final   08/13/2019 2118 96 70 - 130 mg/dL Final     Results from last 7 days   Lab Units 08/14/19  0554 08/12/19  0645 08/09/19  0729   WBC 10*3/mm3 4.97 5.52 7.10   HEMOGLOBIN g/dL 9.0* 9.1* 9.6*   HEMATOCRIT % 28.9* 29.0* 30.0*   PLATELETS 10*3/mm3 258 263 315       Ref. Range 5/22/2019 05:44 5/22/2019 11:34 7/9/2019 08:25 7/18/2019 04:49 7/19/2019 05:05 7/23/2019 05:56 8/1/2019 06:48   Hemoglobin Latest Ref Range: 12.0 - 15.9 g/dL 10.8 (L)  10.6 (L) 8.5 (L) 9.7 (L) 9.3 (L) 7.4 (L)   Hematocrit Latest Ref Range: 34.0 - 46.6 % 35.1  33.4 (L) 26.2 (L) 29.9 (L) 28.6 (L) 23.6 (L)     Results from last 7 days   Lab Units 08/14/19  0554 08/12/19  0645 08/09/19  0729   SODIUM mmol/L 139 141 138   POTASSIUM mmol/L 3.7 3.3* 3.4*   CHLORIDE mmol/L 102 102 99   CO2 mmol/L 27.3 28.7 27.9   BUN mg/dL 19  18 15   CREATININE mg/dL 0.64 0.63 0.67   CALCIUM mg/dL 8.6 8.6 8.6   GLUCOSE mg/dL 152* 149* 168*         Ref. Range 8/1/2019 06:47   25 Hydroxy, Vitamin D Latest Ref Range: 30.0 - 100.0 ng/ml 21.8 (L)       Ref. Range 8/1/2019 06:47   Total Cholesterol Latest Ref Range: 0 - 200 mg/dL 105   HDL Cholesterol Latest Ref Range: 40 - 60 mg/dL 40   LDL Cholesterol  Latest Ref Range: 0 - 100 mg/dL 57   VLDL Cholesterol Latest Ref Range: 5 - 40 mg/dL 8   Triglycerides Latest Ref Range: 0 - 150 mg/dL 40   Medication Review: done  Scheduled Meds:    amLODIPine 5 mg Oral Nightly   apixaban 5 mg Oral Q12H   aspirin 325 mg Oral Daily   atorvastatin 80 mg Oral Nightly   brimonidine 1 drop Both Eyes BID   dorzolamide 1 drop Both Eyes BID   glipiZIDE 5 mg Oral BID AC   hydrALAZINE 50 mg Oral Q8H   insulin regular 0-14 Units Subcutaneous TID AC   lansoprazole 30 mg Oral BID AC   losartan-HCTZ (HYZAAR) 100-12.5 combo dose  Oral Daily   memantine 5 mg Oral Daily   metFORMIN 1,000 mg Oral BID With Meals   nebivolol 20 mg Oral BID   nystatin 5 mL Swish & Spit 4x Daily   potassium chloride 20 mEq Oral Daily With Breakfast   vitamin D 50,000 Units Oral Q7 Days     Continuous Infusions:   PRN Meds:.•  aluminum-magnesium hydroxide-simethicone  •  dextrose  •  dextrose  •  glucagon (human recombinant)  •  nitroglycerin      Assessment/Plan       Stroke (cerebrum) (CMS/[facility] [place])      Assessment & Plan  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  If add Namenda, will probably discontinue amantadine as she continues alert.  August 11-she has some increased irritability at times.  This may be related to the underlying stroke deficits itself.  She does not appear to have any dysuria, no odor to her urine.  Staff reports that for the most part she is eating okay.  Will  check a follow-up CT of the head to assess for any interval visual changes.  She is on aspirin and Eliquis for stroke prophylaxis.  She had noticeably improved alertness when amantadine was started.  She is readily alert now.  This may be related to natural recovery in terms of her level of arousal at this point.  Current plan is to discontinue the amantadine to see if that helps with her irritability and start  on Namenda for aphasia.  August 12 -  Patient with increased restlessness at times.   Continues aphasia. Not able to identify any complaint.  Appears anxious today. No change on CT head yesterday. Started on Namenda for aphasia on 8/12/19.   August 13- will continue to monitor on recent med adjustments   August 15-She continues with expressive language deficits.    Again appears anxious related to her aphasia and difficulty expressing herself.    She will be able to get occasional single word for the examiner.  She does follow instructions.  The patient was reviewed with .  Discussed adding on an antidepressant with anxiolytic properties -Paxil-as it appears frustration from her aphasia with associated anxiety with the frustration affects her performance.  We will plan on starting Paxil 10 mg daily tomorrow and monitor response.  Reviewed with the patient's  that would not add a short acting anxiolytic (such as a benzodiazepine or Seroquel) as it may affect her cognitive performance.      Aphasia -  August 12 - trial of Namenda    Dysphagia-Cortrak feeding tube discontinued on July 31 and started on puréed/honey thick liquid diet with strategies  August 7-advance to fork mash nectar thick liquid diet, consistent carbohydrate.  August 14 - repeat VFSS to assess for advancing to thin liquids  August 15-Video fluoroscopic swallow study today-mechanical soft, no mixed consistency, nectar thick liquid, water protocol between meals, no straws.    History of multiple bilateral strokes and left central  retinal artery occlusion    History of left greater than right internal carotid artery stenosis-status post left internal carotid artery stent July 17, 2019    History of hypertension -  Hyzaar 100-25. August 4 - Bystolic increased to 20 mg daily to 20 mg bid.  August 8 - BP still runs high. On discharge in May 2019 was on Hyzaar 100-25 mg daily, Bystolic 20 mg daily, amlodipine 10 mg daily, Hydralazine 50 mg q 8 hours.  Will add Hydralazine initially 10 mg po q 8 hours and titrate up as needed.   August 9-systolic blood pressure elevated last night and early this morning but better this afternoon at 122-130.  Continue to follow recent addition of hydralazine and titrate up as needed  August 10-systolic blood pressure 175 this afternoon, range otherwise 122-142.  Will titrate up on hydralazine to 20 mg every 8 hours.  August 11-hypertension overall appears improved.  Will titrate up further to 25 mg every 8 hours.  August 12 - add amlodipine 5 mg daily.   August 14 - titrate up on hydralazine to 50 mg q 8 hours  August 15-blood pressure improved this afternoon with systolic blood pressure in the 120s-follow on current regimen    History of diabetes mellitus -Lantus/glipizide (home medication metformin 1000 mg twice daily and glipizide 10 mg twice daily)  August 1-blood sugars were low yesterday afternoon after tube feeds discontinued.  Held glipizide last night and this morning and Lantus last night.  Blood sugar elevated at noontime today.  Will receive resume glipizide at a lower dose of 5 mg twice a day with meals.  Continue sliding scale insulin coverage.  She also is on metformin at home.  August 5-add back metformin initially at 500 mg twice a day and continue glipizide 5 mg twice a day, both one half of previous home dose, titrate up as needed.  August 7-titrate up metformin to 850 mg twice a day.  Add consistent carbohydrate restriction to diet.  August 9-increase metformin to 1000 g twice a day.  August  10-blood glucose 340 last evening but 150-114-178 so far today  August 11-continue to follow blood sugar pattern and may increase glipizide further as current dose glipizide 5 mg twice a day along with metformin 1000 mg twice a day  August 14 - blood glucose  past 24 hours - monitor pattern.  August 15-blood glucose [phone]--[phone]-continue to follow pattern.  Will change sliding scale insulin coverage to Humalog at a lower dose.  She was started on the regular insulin sliding scale when she was on tube feeds.    Stroke prophylaxis-aspirin/Eliquis/atorvastatin    Anemia-August 1-hemoglobin 7.4.  Most recent check on July 23 HGB 9.3.  Will recheck hemoglobin this afternoon.  Hemoccult stool.  Iron studies.  Reticulocyte count.  Recheck CBC in the a.m.  Added Protonix.  Patient is on aspirin and Eliquis.  With recent stroke  will look to transfuse packed red blood cell.  August 2-hemoglobin improved 9.0-1 unit packed red blood cells.  Elevated reticulocyte count in response to anemia.  Nursing describes dark stool.  Patient discussed with gastroenterology.  At this point unable to do endoscopy as on Eliquis and aspirin.  Will treat symptomatically for now with increasing proton pump inhibitor and transfusing as needed.  August 5-anemia improved-hemoglobin 9.8    DVT prophylaxis-SCDs/anticoagulation    Impulsivity-   Nursing to do re-orientation with the patient.  Room close to the nursing station.    Endocrine-vitamin D deficiency-ergocalciferol 50,000 units weekly x8 weeks added. Vitamin B12 level and TSH checked in late May unremarkable     Impaired cognition/impaired language, impaired swallow, impaired activity daily living, impaired mobility     Now admit for comprehensive acute inpatient rehabilitation .  This would be an interdisciplinary program with physical therapy 1 hour,  occupational therapy 1 hour, and speech therapy 1 hour, 5 days a week.  Rehabilitation nursing for carryover, monitoring  of cardiopulmonary and neurologic   status, bowel and bladder, and skin  Ongoing physician follow-up.  Weekly team conferences.  Goals are indeterminant.   Rehabilitation prognosis determined.  Medical prognosis determined.  Estimated length of stay is indeterminate.    TEAM CONF - AUGUST 6- TRANFERS CTG. GAIT UP  FEET CTG-MIN ASSIST. UBD MIN. LBD MOD. BATH MOD. SEVERE APHASIA. INCREASED RESISTANCE TO PARTICIPATE AT TIMES.   PUREE/HTL.  GOOD PO INTAKE. ADJUSTING MEDS FOR DIABETES MELLITUS.  BLADDER CONTINENT/INCONTIENT.   ELOS- 2 WEEKS.     TEAM CONF - AUGUST 13 - DID GREAT WITH PT ON Saturday, FOLLOWING COMMANDS AND BATTING A BALL WITH ANOTHER PATIENT. YESTERDAY, VERY FRUSTRATED AND IRRITABLE.  FRUSTRATED BY APHASIA, TRYING TO TELL STAFF SOMETHING. WILL HOLD ON TO OBJECTS, REPEAT SINGLE WORD.   BED CTG. 4 STAIRS CTG.  GAIT 220 FEET CTG-SBA NO DEVICE.  TOILET TRANSFERS CTG-MIN.  GROOMING MIN.  UBD MIN ASSIST FOR BRA, LBD CTG-MIN. BATH CTG-MIN. COORDINATION RUE BETTER.  DYSPHAGIA - IMPROVED - FORK TALA, NTL. DO NOT FEEL SHE WOULD TOLERATE E-STIM. RECEPTIVE LANGUAGE IMPROVED SLIGHTLY , POINTING TO OBJECTS. EXPRESSIVELY PERSEVERATIVE ON A SINGLE WORD, TRIES TO USE PICTURE BOARD TO COMMUNICATE. YES/NO NOT ACCURATE.  CONTINENT BOWEL AND BLADDER, TIMED VOIDS. SKIN INTACT. TYPICALLY GOOD INTAKE.  ELOS - 1.5 WEEKS    AUGUST 14 - In therapies - In OT, increased participation noted with  present   Transfers SBA/CTG  Walked from therapy gym to room without AD SBA and vc's for directions back to the room   300' outdoors on sidewalk, incline; 300', 180', 100' up on rehab unit. Pt was able to find her room when she got close to it  Bathing, dressing, grooming min assist. Toileting moderate assist.  Pt participated in using R hand to manipualte round pegs into peg board by color with MIN difficutly once pt caught on to the task. with 3D block recreation with R hand with 100% color match, 80% accuracy with block  recreation  With SLP, patient was not able to effectively communicate her wants/needs but refused to go to therapy office by planting her heels while rolling in wc down the [place]; tx session completed in her room       Ajit Howard MD  08/15/19  4:25 PM    Time:

## 2019-08-15 NOTE — MBS/VFSS/FEES
Acute Care - Speech Language Pathology   Swallow Initial Evaluation [facility]     Patient Name: Darby Workman  : 1945  MRN: [id number]  Today's Date: 8/15/2019               Admit Date: 2019    Visit Dx:   No diagnosis found.  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)   • Right-sided headache   • Acute ischemic stroke (CMS/HCC)   • Embolic stroke (CMS/HCC)   • Cerebral infarction due to stenosis of left carotid artery (CMS/HCC)   • Anticoagulated by anticoagulation treatment   • Stroke (cerebrum) (CMS/HCC)     Past Medical History:   Diagnosis Date   • Acute cerebrovascular accident (CVA) of cerebellum (CMS/HCC)    • Acute ischemic stroke (CMS/HCC)    • Arthritis    • Coronary artery disease    • CVA (cerebral vascular accident) (CMS/HCC)    • Diabetes mellitus (CMS/HCC)    • Heart attack (CMS/HCC)    • History of blood clots    • Hyperlipidemia    • Hypertension    • Vision loss      Past Surgical History:   Procedure Laterality Date   • CAROTID ENDARTERECTOMY Left 2019    Procedure: Left carotid endarterectomy;  Surgeon: Rupert Oliva MD;  Location: [facility] OR;  Service: Neurosurgery   • EMBOLECTOMY Left 2019    Procedure: CEREBRAL ANGIOGRAM, LEFT INTERNAL CAROTID ARTERY STENT;  Surgeon: Rupert Oliva MD;  Location: [facility] OR ;  Service: Neurosurgery        SWALLOW EVALUATION (last 72 hours)      SLP Adult Swallow Evaluation     Row Name 08/15/19 0930                   Rehab Evaluation    Document Type  evaluation vfss  -SA        Subjective Information  no complaints  -SA        Patient Observations  alert max cues to participate/accept trials  -SA        Patient Effort  adequate  -SA        Symptoms Noted During/After Treatment  none  -SA           General Information    Current  Method of Nutrition  pureed with some mashed;nectar/syrup-thick liquids  -SA        Plans/Goals Discussed with  patient  -SA        Barriers to Rehab  cognitive status  -SA        Patient's Goals for Discharge  patient did not state  -SA           Pain Assessment    Additional Documentation  Pain Scale: Numbers Pre/Post-Treatment (Group)  -SA           Pain Scale: Numbers Pre/Post-Treatment    Pain Scale: Numbers, Pretreatment  0/10 - no pain  -SA        Pain Scale: Numbers, Post-Treatment  0/10 - no pain  -SA           MBS/VFSS Interpretation    Oral Prep Phase  WFL  -SA        Oral Transit Phase  impaired  -SA        Oral Residue  impaired  -SA           Oral Transit Phase    Impaired Oral Transit Phase  premature spillage of liquids into pharynx  -SA        Premature Spillage of Liquids into Pharynx  mechanical soft  -SA           Oral Residue    Impaired Oral Residue  lingual residue  -SA        Lingual Residue  mechanical soft  -SA        Response to Oral Residue  with spontaneous subsequent swallow;other (see comments) delayed  -SA           Initiation of Pharyngeal Swallow    Pharyngeal Phase  impaired pharyngeal phase of swallowing  -SA        Penetration Before the Swallow  thin liquids;secondary to delayed swallow initiation or mistiming;other (see comments) 60% of swallows  -SA        Response to Penetration  no response  -SA        Pharyngeal Residue  diffuse within pharynx;other (see comments) mild  -SA        Response to Residue  cleared residue with spontaneous subsequent swallow;other (see comments) delayed  -SA           SLP Communication to Radiology    Summary Statement  Pt demonstrates a mild oropharyngeal dysphagia characterized by mistiming and reduced lingual strength.  Oral and mild pharyngeal residue observed which cleared with delayed multiple swallow.  Pt with minimal acceptance of material requiring max cues.  -SA           Clinical Impression    SLP Swallowing Diagnosis  mild  -SA         Functional Impact  risk of aspiration/pneumonia  -SA        Rehab Potential/Prognosis, Swallowing  good, to achieve stated therapy goals  -        Swallow Criteria for Skilled Therapeutic Interventions Met  demonstrates skilled criteria  -SA           Recommendations    Therapy Frequency (Swallow)  PRN  -SA        Predicted Duration Therapy Intervention (Days)  until discharge  -        SLP Diet Recommendation  mechanical soft with no mixed consistencies;nectar thick liquids;water between meals after oral care, with supervision;ice chips between meals after oral care, with supervision  -        Recommended Diagnostics  reassess via VFSS (MBS)  -        Recommended Precautions and Strategies  upright posture during/after eating;small bites of food and sips of liquid;no straw  -SA        SLP Rec. for Method of Medication Administration  with pudding or applesauce;with thick liquids  -        Monitor for Signs of Aspiration  notify SLP if any concerns  -        Anticipated Dischage Disposition  unknown;anticipate therapy at next level of care  -           Swallow Goals (SLP)    Oral Nutrition/Hydration Goal Selection (SLP)  oral nutrition/hydration, SLP goal (free text)  -           Oral Nutrition/Hydration Goal 2 (SLP)    Progress/Outcomes (Oral Nutrition/Hydration Goal 2, SLP)  goal met  -           Oral Nutrition/Hydration Goal (SLP)    Oral Nutrition/Hydration Goal, SLP  Pt will tolerate mechanical soft, no mixed consistencies, nectar thick  -        Time Frame (Oral Nutrition/Hydration Goal, SLP)  by discharge  -          User Key  (r) = Recorded By, (t) = Taken By, (c) = Cosigned By    Initials Name Effective Dates    Carla Yoo MS [facility]-SLP 03/07/18 -           EDUCATION  The patient has been educated in the following areas:   Dysphagia (Swallowing Impairment) Modified Diet Instruction.    SLP Recommendation and Plan  SLP Swallowing Diagnosis: mild  SLP Diet Recommendation:  mechanical soft with no mixed consistencies, nectar thick liquids, water between meals after oral care, with supervision, ice chips between meals after oral care, with supervision  Recommended Precautions and Strategies: upright posture during/after eating, small bites of food and sips of liquid, no straw  SLP Rec. for Method of Medication Administration: with pudding or applesauce, with thick liquids     Monitor for Signs of Aspiration: notify SLP if any concerns  Recommended Diagnostics: reassess via VFSS ([facility])  Swallow Criteria for Skilled Therapeutic Interventions Met: demonstrates skilled criteria  Anticipated Dischage Disposition: unknown, anticipate therapy at next level of care  Rehab Potential/Prognosis, Swallowing: good, to achieve stated therapy goals  Therapy Frequency (Swallow): PRN  Predicted Duration Therapy Intervention (Days): until discharge       Plan of Care Reviewed With: patient  Plan of Care Review  Plan of Care Reviewed With: patient    SLP GOALS     Row Name 08/15/19 0930 08/14/19 1100 08/14/19 0930       Oral Nutrition/Hydration Goal 2 (SLP)    Barriers (Oral Nutrition/Hydration Goal 2, SLP)  --  No overt s/s pen/asp with TL via cup throughout session.  -KB  No overt s/s pen/asp with TL via cup throughout session.  -KB    Progress/Outcomes (Oral Nutrition/Hydration Goal 2, SLP)  goal met  -SA  good progress toward goal;goal ongoing  -KB  good progress toward goal;goal ongoing  -KB       Oral Nutrition/Hydration Goal (SLP)    Oral Nutrition/Hydration Goal, SLP  Pt will tolerate mechanical soft, no mixed consistencies, nectar thick  -SA  --  --    Time Frame (Oral Nutrition/Hydration Goal, SLP)  by discharge  -SA  --  --       Words/Phrases/Sentences Goal 1 (SLP)    Progress (Ability to Contruct Words/Phrases/Sentences Goal 1, SLP)  --  with minimal cues (75-90%);with maximum cues (25-49%)  -KB  independently (over 90% accuracy)  -KB    Progress/Outcomes (Identify Objects and Pictures Goal 1,  SLP)  --  goal ongoing  -KB  good progress toward goal;goal ongoing  -KB    Comment (Words/Phrases/Sentences Goal 1, SLP)  --  30% identifying named body parts, 80% following direct physical model  -KB  100% identifying pictured objects by name in fo2  -KB       Reading Comprehension of Basic Signs and Letters Goal 1 (SLP)    Progress (Reading Comprehension of Basic Signs and Letters Goal 1, SLP)  --  --  with 1:1 supervision/constant cues  -KB    Progress/Outcomes (Reading Comprehension of Basic Signs and Letters Goal 1, SLP)  --  --  goal ongoing  -KB    Comment (Reading Comprehension of Basic Signs and Letters Goal 1, SLP)  --  --  attempted matching 2-3 digit numbers to like number in fo4, patient completed with 0% accuracy across 5 attempts despite maximal cueing  -KB       Word Retrieval Skills Goal 1 (SLP)    Progress (Word Retrieval Skills Goal 1, SLP)  --  with moderate cues (50-74%);with maximum cues (25-49%)  -KB  --    Progress/Outcomes (Word Retrieval Goal 1, SLP)  --  goal ongoing  -KB  --    Comment (Word Retrieval Goal 1, SLP)  --  10%, 10%, 20% across 3 trials of reciting alphabet, able to follow svetlana but most letter names were inaccurate; 20%, 20%, 50% counting 1-10 with unison model, verbal cues faded on third attempt  -KB  --       Word Retrieval Skills Goal 2 (SLP)    Progress (Word Retrieval Skills Goal 2, SLP)  --  with maximum cues (25-49%);with 1:1 supervision/constant cues  -KB  --    Progress/Outcomes (Word Retrieval Goal 2, SLP)  --  goal ongoing  -KB  --    Comment (Word Retrieval Goal 2, SLP)  --  0% imitation of short words given object stimuli, 50% with maximal cueing including direct verbal model with visual cues for accurate sound production, approximations and semantic paraphasias noted  -KB  --       Graphic Expression of Shapes, Letters, Numbers Goal 1 (SLP)    Progress (Graphic Expression of Shapes, Letters, and Numbers Goal 1, SLP)  --  --  with moderate cues (50-74%)  -KB     "Progress/Outcomes (Graphic Expression of Shapes, Letters, and Numbers Goal 1, SLP)  --  --  goal ongoing  -KB    Comment (Graphic Expression of Shapes, Letters, and Numbers Goal 1, SLP)  --  --  69% copying single letters, discontinued task when patient perseverated on writing \"k\" x4  -KB       Planning and Execution of Connected Speech Goal 1 (SLP)    Progress (Planning and Execution of Connected Speech Goal 1, SLP)  --  with maximum cues (25-49%)  -KB  --    Progress/Outcomes (Planning and Execution of Connected Speech Goal 1, SLP)  --  goal ongoing  -KB  --    Comment (Planning and Execution of Connected Speech Goal 1, SLP)  --  30% direct imitation of functional CV words, 80% with additional verbal modeling and visual cues, 50% approximations noted  -KB  --       Augmentative/Alternative Communication Objectives Goal 1 (SLP    Progress (Augmentative/Alternative Communication Goal 1, SLP)  --  with 1:1 supervision/constant cues  -KB  --    Progress/Outcomes (Augmentative/Alternative Communication Goal 1, SLP)  --  goal ongoing;progress slower than expected  -KB  --    Comment (Augmentative/Alternative Communication Goal 1, SLP)  --  Attempted use of basic picture communication board to understand patient's want/need in regards to refusing therapy in tx office; patient was unable to effectively communicate a want/need using pistures thought she looked at the page and appeared to be considering the options  -KB  --    Row Name 08/13/19 1300 08/13/19 0930          Oral Nutrition/Hydration Goal 2 (SLP)    Barriers (Oral Nutrition/Hydration Goal 2, SLP)  No overt s/s pen/asp with TL via cup throughout session.  -KB  --     Progress/Outcomes (Oral Nutrition/Hydration Goal 2, SLP)  good progress toward goal;goal ongoing  -KB  --        Words/Phrases/Sentences Goal 1 (SLP)    Progress (Ability to Contruct Words/Phrases/Sentences Goal 1, SLP)  with maximum cues (25-49%)  -KB  --     Progress/Outcomes (Identify Objects and " Pictures Goal 1, SLP)  goal ongoing  -KB  --     Comment (Words/Phrases/Sentences Goal 1, SLP)  40% identifying pictured objects by name in fo3; 30% pointing to named body parts, 50% givne 2 visual choices, 100% with direct model  -KB  --        Comprehend Questions Goal 1 (SLP)    Progress (Ability to Comprehend Questions Goal 1, SLP)  --  with maximum cues (25-49%);with 1:1 supervision/constant cues  -KB     Progress/Outcomes (Comprehend Questions Goal 1, SLP)  --  goal ongoing  -KB     Comment (Comprehend Questions Goal 1, SLP)  --  20% answering basic yes/no questions presented verbally with written answer choices, 40% with maximal cues  -KB        Reading Comprehension of Basic Signs and Letters Goal 1 (SLP)    Progress (Reading Comprehension of Basic Signs and Letters Goal 1, SLP)  --  independently (over 90% accuracy)  -KB     Progress/Outcomes (Reading Comprehension of Basic Signs and Letters Goal 1, SLP)  --  good progress toward goal;goal ongoing  -KB     Comment (Reading Comprehension of Basic Signs and Letters Goal 1, SLP)  --  100% matching number to like number in fo4  -KB        Word Retrieval Skills Goal 1 (SLP)    Progress (Word Retrieval Skills Goal 1, SLP)  with moderate cues (50-74%)  -KB  with moderate cues (50-74%);with maximum cues (25-49%)  -KB     Progress/Outcomes (Word Retrieval Goal 1, SLP)  good progress toward goal;goal ongoing  -KB  goal ongoing  -KB     Comment (Word Retrieval Goal 1, SLP)  increased attempts to sing along with alphabet given visual support and verbal model, 50% overall approximation across 3 trials, perseverations noted consistently, patient able to maintain svetlana of alphabet song  -KB  50%, 30%, 40% counting 1-10 with visual support and direct verbal model across 3 trials respectively  -KB        Graphic Expression of Shapes, Letters, Numbers Goal 1 (SLP)    Progress (Graphic Expression of Shapes, Letters, and Numbers Goal 1, SLP)  with moderate cues (50-74%)  -KB   --     Progress/Outcomes (Graphic Expression of Shapes, Letters, and Numbers Goal 1, SLP)  goal ongoing  -KB  --     Comment (Graphic Expression of Shapes, Letters, and Numbers Goal 1, SLP)  70% filling in blanks from written numbers 1-10 and alphabet A-N  -KB  --        Graphic Expression of Words Goal 1 (SLP)    Progress (Graphic Expression of Single Words Goal 1, SLP)  --  independently (over 90% accuracy)  -KB     Progress/Outcomes (Graphic Expression of Single Words Goal 1, SLP)  --  good progress toward goal;goal ongoing  -KB     Comment (Graphic Expression of Single Words Goal 1, SLP)  --  100% accuracy copying her first and last name as well as 3 letter words (x6), written letter approximations noted, but considered legible with written model present  -KB        Planning and Execution of Connected Speech Goal 1 (SLP)    Progress (Planning and Execution of Connected Speech Goal 1, SLP)  --  with maximum cues (25-49%)  -KB     Progress/Outcomes (Planning and Execution of Connected Speech Goal 1, SLP)  --  goal ongoing  -KB     Comment (Planning and Execution of Connected Speech Goal 1, SLP)  --  90% imitation of functional CV words, 20% approximations  -KB        Additional Goal 1 (SLP)    Barriers (Additional Goal 1, SLP)  --  patient able to complete 0/2 trials accurately on attempted basic clock reading task with 3 written choices of given time, max cues not effective and patient became frustrated with task so it was discontinued  -KB     Progress/Outcomes (Additional Goal 1, SLP)  --  goal ongoing  -KB       User Key  (r) = Recorded By, (t) = Taken By, (c) = Cosigned By    Initials Name Provider Type    Carla Yoo MS CCC-SLP Speech and Language Pathologist    KB Bruton, Katherine L Speech and Language Pathologist           SLP Outcome Measures (last 72 hours)      SLP Outcome Measures     Row Name 08/15/19 1300             SLP Outcome Measures    Outcome Measure Used?  Adult NOMS  -SA          Adult FCM Scores    Swallowing FCM Score  4  -        User Key  (r) = Recorded By, (t) = Taken By, (c) = Cosigned By    Initials Name Effective Dates    Carla Yoo MS CCC-SLP 03/07/18 -            Time Calculation:   Time Calculation- SLP     Row Name 08/15/19 1324 08/15/19 1217          Time Calculation- SLP    SLP Start Time  0930  -  --     SLP Non-Billable Time (min)  --  40 min family conference  -PIETER       User Key  (r) = Recorded By, (t) = Taken By, (c) = Cosigned By    Initials Name Provider Type    Carla Yoo MS CCC-SLP Speech and Language Pathologist    KB Bruton, Katherine L Speech and Language Pathologist          Therapy Charges for Today     Code Description Service Date Service Provider Modifiers Qty    71776758458 HC ST MOTION FLUORO EVAL SWALLOW 4 8/15/2019 Carla Rossi MS CCC-SLP GN 1               Carla Rossi MS CCC-WHIT  8/15/2019

## 2019-08-15 NOTE — DISCHARGE PLACEMENT REQUEST
"Noman Mcdonald (74 y.o. Female)     Date of Birth Social Security Number Address Home Phone MRN    1945  [id number] [address] [phone] [id number]    [employer] Marital Status          Rastafarian        Admission Date Admission Type Admitting Provider Attending Provider Department, Room/Bed    7/31/19 Elective Daniel Howard MD Gormley, John Michael, MD [facility], 4413/    Discharge Date Discharge Disposition Discharge Destination                       Attending Provider:  Daniel Howard MD    Allergies:  No Known Allergies    Isolation:  None   Infection:  None   Code Status:  CPR    Ht:  165.1 cm (65\")   Wt:  60.9 kg (134 lb 3.2 oz)    Admission Cmt:  None   Principal Problem:  None                Active Insurance as of 7/31/2019     Primary Coverage     Payor Plan Insurance Group Employer/Plan Group    [facility] [id number]     Payor Plan Address Payor Plan Phone Number Payor Plan Fax Number Effective Dates    [address]   4/1/2018 - None Entered    [facility] [phone]       Subscriber Name Subscriber Birth Date Member ID       DANIEL MCDONALD W 2/6/1946 [id number]           Secondary Coverage     Payor Plan Insurance Group Employer/Plan Group    MEDICARE MEDICARE A ONLY      Payor Plan Address Payor Plan Phone Number Payor Plan Fax Number Effective Dates    [address] [phone]  10/1/2010 - None Entered    [facility] [id number]       Subscriber Name Subscriber Birth Date Member ID       NOMAN MCDONALD 1945 [id number]                 Emergency Contacts      (Rel.) Home Phone Work Phone Mobile Phone    eder mcdonald (Daughter) [phone]    Daniel Mcdonald (Spouse) [phone] -- --              "

## 2019-08-15 NOTE — THERAPY TREATMENT NOTE
Inpatient Rehabilitation - Occupational Therapy Treatment Note    [facility]     Patient Name: Darby Workman  : 1945  MRN: [id number]    Today's Date: 8/15/2019                 Admit Date: 2019      Visit Dx:  No diagnosis found.    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)   • Right-sided headache   • Acute ischemic stroke (CMS/HCC)   • Embolic stroke (CMS/HCC)   • Cerebral infarction due to stenosis of left carotid artery (CMS/HCC)   • Anticoagulated by anticoagulation treatment   • Stroke (cerebrum) (CMS/HCC)         Therapy Treatment    IRF Treatment Summary     Row Name 08/15/19 1509 08/15/19 0848          Evaluation/Treatment Time and Intent    Subjective Information  no complaints  -AF  no complaints  -MD     Existing Precautions/Restrictions  fall  -AF  fall  -MD     Document Type  therapy note (daily note)  -AF  therapy note (daily note)  -MD     Mode of Treatment  occupational therapy  -AF  physical therapy  -MD     Patient/Family Observations  sitting up in w/c in AM, refused shower, in PM refused to get out of bed and difficulty wtih her agreeing to any type of activity  -AF  Pt sitting in WC showing no signs of acute distress.  -MD     Recorded by [AF] Ingris Ansari, OTR [MD] Mira Chadwick, PT     Row Name 08/15/19 1509             Cognition/Psychosocial- PT/OT    Affect/Mental Status (Cognitive)  confused  -AF      Behavioral Issues (Cognitive)  uncooperative  -AF      Orientation Status (Cognition)  unable/difficult to assess  -AF      Follows Commands (Cognition)  0-24% accuracy;delayed response/completion;increased processing time needed;repetition of directions required;verbal cues/prompting required  -AF      Personal Safety Interventions  fall prevention program maintained;gait belt;nonskid  shoes/slippers when out of bed  -AF      Attention Deficit (Cognitive)  moderate deficit  -AF      Memory Deficit (Cognitive)  unable/difficult to assess  -AF      Safety Deficit (Cognitive)  insight into deficits/self awareness;awareness of need for assistance  -AF      Recorded by [AF] Ingris Ansari OTR      Row Name 08/15/19 1509             Bed Mobility Assessment/Treatment    Comment (Bed Mobility)  refused to get out of bed in PM session  -AF      Recorded by [AF] Ingris Ansari OTR      Row Name 08/15/19 1509 08/15/19 0848          Transfer Assessment/Treatment    Transfer Assessment/Treatment  --  car transfer  -MD     Comment (Transfers)  sit to stand at sink with CGA during LBD  -AF  --     Recorded by [AF] Ingris Ansari OTR [MD] Mira Chadwick, PT     Row Name 08/15/19 0848             Sit-Stand Transfer    Sit-Stand [place] (Transfers)  stand by assist  -MD      Recorded by [MD] Mira Chadwick, PT      Row Name 08/15/19 0848             Stand-Sit Transfer    Stand-Sit [place] (Transfers)  stand by assist  -MD      Recorded by [MD] Mira Chadwick, PT      Row Name 08/15/19 1509             Shower Transfer    Type (Shower Transfer)  -- refused  -AF      Recorded by [AF] Ingris Ansari OTR      Row Name 08/15/19 0848             Car Transfer    [place] Level (Car Transfer)  stand by assist  -MD      Recorded by [MD] Mira Chadwick, PT      Row Name 08/15/19 0848             Gait/Stairs Assessment/Training    [place] Level (Gait)  stand by assist  -MD      Distance in Feet (Gait)  200  -MD      Pattern (Gait)  step-through  -MD      Deviations/Abnormal Patterns (Gait)  gait speed decreased  -MD      Bilateral Gait Deviations  heel strike decreased;forward flexed posture  -MD      [place] Level (Stairs)  contact guard  -MD      Handrail Location (Stairs)  both sides  -MD      Number of Steps (Stairs)  4  -MD      Ascending Technique (Stairs)  step-over-step  -MD      Descending  Technique (Stairs)  step-to-step  -MD      Recorded by [MD] Mira Chadwick PT      Row Name 08/15/19 1509             Bathing Assessment/Treatment    Bathing [place] Level  bathing skills;upper body;minimum assist (75% patient effort)  -AF      Bathing Position  supported sitting;supported standing  -AF      Comment (Bathing)  vc's and assist for throughness  -AF      Recorded by [AF] Ingris Ansari OTR      Row Name 08/15/19 1509             Upper Body Dressing Assessment/Treatment    Upper Body Dressing Task  upper body dressing skills;pull over garment;verbal cues;minimum assist (75% or more patient effort)  -AF      Comment (Upper Body Dressing)  refused to change bra  -AF      Recorded by [AF] Ingris Ansari OTR      Row Name 08/15/19 1509             Lower Body Dressing Assessment/Treatment    Lower Body Dressing [place] Level  doff;don;pants/bottoms;shoes/slippers;socks;moderate assist (50% patient effort)  -AF      Lower Body Dressing Position  supported sitting;supported standing  -AF      Recorded by [AF] Ingris Ansari OTR      Row Name 08/15/19 1509             Grooming Assessment/Treatment    Grooming [place] Level  grooming skills;wash face, hands;oral care regimen;hair care, combing/brushing;minimum assist (75% patient effort);verbal cues  -AF      Grooming Position  sink side;supported sitting  -AF      Comment (Grooming)  required assistance to squeeze toothpaste on toothbrush.   -AF      Recorded by [AF] Ingris Ansari OTR      Row Name 08/15/19 1509             Toileting Assessment/Treatment    Comment (Toileting)  dependent with through hygiene   -AF      Recorded by [AF] Ingris Ansari OTR      Row Name 08/15/19 1509 08/15/19 0848          Pain Scale: Numbers Pre/Post-Treatment    Pain Scale: Numbers, Pretreatment  0/10 - no pain  -AF  0/10 - no pain  -MD     Pain Scale: Numbers, Post-Treatment  0/10 - no pain  -AF  --     Recorded by [AF] Ingris Ansari, JOHN  [MD] Mira Chadwick PT     Row Name 08/15/19 0900             Standing Balance Activity    Activities Performed (Standing, Balance Training)  standing reaching outside base of support;standing ball toss batting balloon  -MD      Support Needed for Balance (Standing, Balance Training)  CGA  -MD      Recorded by [MD] Mira Chadwick PT      Row Name 08/15/19 1509             Neuromuscular Re-education    Comment (Neuromuscular Re-education)  attempted to use pink foam block would complete 1-2 reps then quit, perservated on switching it back and forth between her hands, refused hand gripper kept handing it back to the therapist. able to follow direction to blos her nose when handed a tissue. unable to follow directions and attempted to use communiation board but became frustrated and folded papers back up with both hands. refused any attempts with yellow theraband. calmed patient by holding her hand, attempted to disucss TV show she was watching and would perservate on yes or no  -AF      Recorded by [AF] Ingris Ansari OTR      Row Name 08/15/19 1509 08/15/19 0900 08/15/19 0848       Positioning and Restraints    Pre-Treatment Position  sitting in chair/recliner  -AF  sitting in chair/recliner  -MD  sitting in chair/recliner  -MD    Post Treatment Position  bed  -AF  bed  -MD  --    In Bed  supine;call light within reach;encouraged to call for assist;exit alarm on in PM  -AF  supine;call light within reach;exit alarm on  -MD  --    In Wheelchair  sitting;with PT in AM  -AF  --  --    Recorded by [AF] Ingris Ansari OTR [MD] Mira Chadwick PT [MD] Mira Chadwick PT    Row Name 08/15/19 0900             Weekly Summary of Progress (PT)    Weekly Outcome Summary: Physical Therapy  Pt more aggitated this week limiting pts willingness to participate.  Pt progressing w transfers and ambulation this week.  -MD      Recorded by [MD] Mira Chadwick PT        User Key  (r) = Recorded By, (t) = Taken By, (c) = Cosigned By    Initials  Name Effective Dates    AF Ansari, Ingris Hernandezn, OTR 04/03/18 -     MD Farrukh, Mira, PT 04/03/18 -           Wound 07/17/19 1015 Left neck incision (Active)   Dressing Appearance open to air 8/15/2019  8:00 AM   Closure Liquid skin adhesive 8/15/2019  8:00 AM   Base dry;clean 8/15/2019  8:00 AM   Drainage Amount none 8/14/2019  8:00 PM         OT Recommendation and Plan    Anticipated Equipment Needs At Discharge (OT Eval): bathing equipment  Planned Therapy Interventions (OT Eval): activity tolerance training, BADL retraining, cognitive/visual perception retraining, functional balance retraining, neuromuscular control/coordination retraining, occupation/activity based interventions, patient/caregiver education/training, ROM/therapeutic exercise, strengthening exercise, transfer/mobility retraining                Occupational Therapy Education     Title: PT OT SLP Therapies (Not Started)     Topic: Occupational Therapy (Done)     Point: ADL training (Done)     Description: Instruct learner(s) on proper safety adaptation and remediation techniques during self care or transfers.   Instruct in proper use of assistive devices.    Learning Progress Summary           Patient Acceptance, E, VU,NR by AF at 8/15/2019  3:22 PM    Comment:  Pt and family participated in meeting with rehab team, recommend 24 hour supervision and would beneift from being in her own environment. discussed her safety awareness and the need for hands on assistance with some ADL tasks seocndary R UE and cognition    Nonacceptance, E, NR by AF at 8/13/2019  3:48 PM    Comment:  benefits and purpose of therapy   Family Acceptance, E, VU,NR by AF at 8/15/2019  3:22 PM    Comment:  Pt and family participated in meeting with rehab team, recommend 24 hour supervision and would beneift from being in her own environment. discussed her safety awareness and the need for hands on assistance with some ADL tasks seocndary R UE and cognition                   Point:  Home exercise program (Done)     Description: Instruct learner(s) on appropriate technique for monitoring, assisting and/or progressing therapeutic exercises/activities.    Learning Progress Summary           Patient Acceptance, E, VU,NR by AF at 8/15/2019  3:22 PM    Comment:  Pt and family participated in meeting with rehab team, recommend 24 hour supervision and would beneift from being in her own environment. discussed her safety awareness and the need for hands on assistance with some ADL tasks seocndary R UE and cognition   Family Acceptance, E, VU,NR by AF at 8/15/2019  3:22 PM    Comment:  Pt and family participated in meeting with rehab team, recommend 24 hour supervision and would beneift from being in her own environment. discussed her safety awareness and the need for hands on assistance with some ADL tasks seocndary R UE and cognition                               User Key     Initials Effective Dates Name Provider Type [facility] 04/03/18 -  Ingris Ansari OTR Occupational Therapist OT                       Time Calculation:     Time Calculation- OT     Row Name 08/15/19 1524 08/15/19 1523          Time Calculation- OT    OT Start Time  1430  -AF  0800  -AF     OT Stop Time  1500  -AF  0830  -AF     OT Time Calculation (min)  30 min  -AF  30 min  -AF     OT Non-Billable Time (min)  30 min family meeting   -AF  --       User Key  (r) = Recorded By, (t) = Taken By, (c) = Cosigned By    Initials Name Provider Type    AF Ingris Ansari OTKEVIN Occupational Therapist          Therapy Charges for Today     Code Description Service Date Service Provider Modifiers Qty    76299754771 HC OT SELF CARE/MGMT/TRAIN EA 15 MIN 8/14/2019 Ingris Ansari OTKEVIN GO 2    49975427975 HC OT NEUROMUSC RE EDUCATION EA 15 MIN 8/14/2019 Ingris Ansari OTKEVIN GO 2    81364731225 HC OT CARE PLAN EA 15 MIN 8/15/2019 Ingris Ansari OTR GO 1    70998046685 HC OT NEUROMUSC RE EDUCATION EA 15 MIN 8/15/2019 Ingris Ansari  Ronit, OTR GO 1    51166590077 HC OT THER PROC EA 15 MIN 8/15/2019 Ingris Ansari OTR GO 1    84946421447 HC OT SELF CARE/MGMT/TRAIN EA 15 MIN 8/15/2019 Ingris Ansari, JOHN GO 2                   JOHN Mejia  8/15/2019

## 2019-08-15 NOTE — PROGRESS NOTES
"Family conference held today with pt, pt's , daughter, brother-in-law ( by phone), PT,OT,ST and SW.  Discussed pt's current status, progress in therapies and discharge plans.   In PT, pt completes bed mobility and transfers with CG-SBA. She ambulates 250' with SBA and can ascend/descend 4 steps with rails and CG. She has decreased safety awareness and can be impulsive. PT reports the pt is intermittently following one step directions better.     In OT, pt requires CG assist to move around in \"tighter\" spaces such as the bathroom. She bathes, on a seat with SBA and dresses with MIN assist, cues needed to sequence the task.  Pt needs MOD assist with toileting.  Right hand strength and coordination is impaired, but improving.  OT notes pt's participation in therapy is better when family is present.    ST discussed results of repeat VFSS and diet upgrade to mechanical soft with NTL.  Water protocol reviewed. ST explained definition of global aphasia and strategies to facilitate communication with pt. Pt's comprehension is slowly improving and she has started to use a very basic communication board, although this is inconsistent.     The team explained that pt will need close, 24 hour supervision for safety. Continued PT, ST and OT is also recommended.      SW discussed discharge options, home vs subacute care.  Family would like to bring pt home, but 24 hour family care would be difficult as pt's  works full time and pt's daughter has 2 young children.   Discussed services through in-home care agencies and adult day programs.   has been in contact with [facility] and requests referral to this facility. Referral made today.  Will continue to provide support and assist with plans.  "

## 2019-08-15 NOTE — PROGRESS NOTES
Inpatient Rehabilitation Functional Measures Assessment    Functional Measures  KATI Eating:  [facility] Grooming: [facility] Bathing:  [facility] Upper Body Dressing:  [facility] Lower Body Dressing:  [facility] Toileting:  [facility] Bladder Management  Level of Assistance:  [place]  Frequency/Number of Accidents this Shift:  [facility] Bowel Management  Level of Assistance: [place]  Frequency/Number of Accidents this Shift: [facility] Bed/Chair/Wheelchair Transfer:  Bed/chair/wheelchair Transfer Score = 5.  Patient is supervision/set-up for transferring to and from the  bed/chair/wheelchair, requiring: No assistive devices were required.  [facility] Toilet Transfer:  [facility] Tub/Shower Transfer:  [place]    Previously Documented Mode of Locomotion at Discharge: Field  KATI Expected Mode of Locomotion at Discharge: [facility] Walk/Wheelchair:  WHEELCHAIR OBSERVATION   Activity was not observed.    WALK OBSERVATION   Walk Distance Scale = 3.  Distance walked is greater than 150 feet. Walk Score  = 5.  Patient requires supervision or set up for walking. Stand by assistance.  Patient walked a distance of  200 feet. No assistive devices were required.  KATI Stairs:  Stairs Score = 2.  Incidental assistance with lifting or lowering,  contact guard or steadying was provided. Patient performs 75% or more of effort  and requires minimal contact assistance. Patient negotiated  4 stairs. Patient  requires the following assistive device(s): Handrail(s).    KATI Comprehension:  [facility] Expression:  [facility] Social Interaction:  [facility] Problem Solving:  [facility] Memory:  [place]    Therapy Mode Minutes  Occupational Therapy: [place]  Physical Therapy: Individual: 60 minutes.  Speech Language Pathology:  [place]    Signed by: Mira Chadwick, PT

## 2019-08-15 NOTE — PLAN OF CARE
Problem: Patient Care Overview  Goal: Plan of Care Review  Outcome: Ongoing (interventions implemented as appropriate)   08/15/19 1322   Coping/Psychosocial   Plan of Care Reviewed With patient   OTHER   Outcome Summary Pt demonstrates a mild oropharyngeal dysphagia characterized by mistiming and reduced lingual strength. Oral and mild pharyngeal residue observed which cleared with delayed multiple swallow. Pt with minimal acceptance of material requiring max cues. REC mech soft, no mixed consistencies, nectar thick, no straws. Meds w/ puree or nectar. Upright with all PO, allow time for repeat swallows

## 2019-08-15 NOTE — THERAPY TREATMENT NOTE
Inpatient Rehabilitation - Physical Therapy Treatment Note  [facility]     Patient Name: Darby Workman  : 1945  MRN: [id number]    Today's Date: 8/15/2019                 Admit Date: 2019      Visit Dx:    No diagnosis found.    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)   • Right-sided headache   • Acute ischemic stroke (CMS/HCC)   • Embolic stroke (CMS/HCC)   • Cerebral infarction due to stenosis of left carotid artery (CMS/HCC)   • Anticoagulated by anticoagulation treatment   • Stroke (cerebrum) (CMS/HCC)       Therapy Treatment    IRF Treatment Summary     Row Name 08/15/19 0848             Evaluation/Treatment Time and Intent    Subjective Information  no complaints  -MD      Existing Precautions/Restrictions  fall  -MD      Document Type  therapy note (daily note)  -MD      Mode of Treatment  physical therapy  -MD      Patient/Family Observations  Pt sitting in WC showing no signs of acute distress.  -MD      Recorded by [MD] Mira Chadwick, PT      Row Name 08/15/19 0848             Transfer Assessment/Treatment    Transfer Assessment/Treatment  car transfer  -MD      Recorded by [MD] Mira Chadwick, PT      Row Name 08/15/19 0848             Sit-Stand Transfer    Sit-Stand [place] (Transfers)  stand by assist  -MD      Recorded by [MD] Mira Chadwick PT      Row Name 08/15/19 0848             Stand-Sit Transfer    Stand-Sit [place] (Transfers)  stand by assist  -MD      Recorded by [MD] Mira Chadwick PT      Row Name 08/15/19 0848             Car Transfer    [place] Level (Car Transfer)  stand by assist  -MD      Recorded by [MD] Mira Chadwick PT      Row Name 08/15/19 0848             Gait/Stairs Assessment/Training    [place] Level (Gait)  stand by assist  -MD      Distance in Feet (Gait)  200  -MD       Pattern (Gait)  step-through  -MD      Deviations/Abnormal Patterns (Gait)  gait speed decreased  -MD      Bilateral Gait Deviations  heel strike decreased;forward flexed posture  -MD      [place] Level (Stairs)  contact guard  -MD      Handrail Location (Stairs)  both sides  -MD      Number of Steps (Stairs)  4  -MD      Ascending Technique (Stairs)  step-over-step  -MD      Descending Technique (Stairs)  step-to-step  -MD      Recorded by [MD] Mira Chadwick, PT      Row Name 08/15/19 0848             Pain Scale: Numbers Pre/Post-Treatment    Pain Scale: Numbers, Pretreatment  0/10 - no pain  -MD      Recorded by [MD] Mira Chadwick, PT      Row Name 08/15/19 0900             Standing Balance Activity    Activities Performed (Standing, Balance Training)  standing reaching outside base of support;standing ball toss batting balloon  -MD      Support Needed for Balance (Standing, Balance Training)  CGA  -MD      Recorded by [MD] Mira Chadwick, PT      Row Name 08/15/19 0900 08/15/19 0848          Positioning and Restraints    Pre-Treatment Position  sitting in chair/recliner  -MD  sitting in chair/recliner  -MD     Post Treatment Position  bed  -MD  --     In Bed  supine;call light within reach;exit alarm on  -MD  --     Recorded by [MD] Mira Chadwick, PT [MD] Mira Chadwick, PT     Row Name 08/15/19 0900             Weekly Summary of Progress (PT)    Weekly Outcome Summary: Physical Therapy  Pt more aggitated this week limiting pts willingness to participate.  Pt progressing w transfers and ambulation this week.  -MD      Recorded by [MD] Mira Chadwick PT        User Key  (r) = Recorded By, (t) = Taken By, (c) = Cosigned By    Initials Name Effective Dates    Mira Vaca, PT 04/03/18 -         Wound 07/17/19 1015 Left neck incision (Active)   Dressing Appearance open to air 8/15/2019  8:00 AM   Closure Liquid skin adhesive 8/15/2019  8:00 AM   Base dry;clean 8/15/2019  8:00 AM   Drainage Amount none 8/14/2019  8:00 PM      Physical Therapy Education     Title: PT OT SLP Therapies (Not Started)     Topic: Physical Therapy (In Progress)     Point: Mobility training (Done)     Learning Progress Summary           Patient Acceptance, E,TB, VU,NR by KELBY at 8/14/2019  3:15 PM    Nonacceptance, E,TB,D, NR by ENID at 8/5/2019 12:00 PM    Acceptance, E,D, NR by NESTOR at 8/3/2019  1:35 PM    Acceptance, D, DU,NR by NESTOR at 8/3/2019  8:56 AM                   Point: Home exercise program (In Progress)     Learning Progress Summary           Patient Nonacceptance, E,TB,D, NR by ENID at 8/5/2019 12:00 PM                   Point: Precautions (In Progress)     Learning Progress Summary           Patient Acceptance, E, NR by MD at 8/15/2019  8:59 AM    Acceptance, E, NR by MD at 8/13/2019 11:14 AM    Acceptance, E, NR by MD at 8/12/2019 10:45 AM    Acceptance, E, NR by MD at 8/10/2019 11:15 AM    Acceptance, E, NR by MD at 8/9/2019 11:12 AM    Acceptance, E, NR by MD at 8/8/2019 11:48 AM    Acceptance, E, NR by MD at 8/6/2019 10:41 AM    Acceptance, E, NR by MD at 8/2/2019 10:44 AM    Acceptance, E,D, NR by MD at 8/1/2019 12:16 PM                               User Key     Initials Effective Dates Name Provider Type Discipline     06/08/18 -  Poppy Rosa, PT Physical Therapist PT    NESTOR 04/03/18 -  Nicole Austin, PT Physical Therapist PT    MD 04/03/18 -  Mira Chadwick PT Physical Therapist PT     04/03/18 -  Marycruz Schmitt, PT Physical Therapist PT                  PT Recommendation and Plan  Anticipated Equipment Needs at Discharge (PT Eval): front wheeled walker  Frequency of Treatment (PT Eval): 5 times per week, 60 minutes per session            PT IRF GOALS     Row Name 08/15/19 0917             Bed Mobility Goal 1 (PT-IRF)    Progress/Outcomes (Bed Mobility Goal 1, PT-IRF)  goal met  -MD         Transfer Goal 1 (PT-IRF)    Progress/Outcomes (Transfer Goal 1, PT-IRF)  goal met  -MD         Transfer Goal 2 (PT-IRF)    Activity/Assistive  Device (Transfer Goal 2, PT-IRF)  car transfer  -MD      [place] Level (Transfer Goal 2, PT-IRF)  supervision required  -MD      Time Frame (Transfer Goal 2, PT-IRF)  5 - 7 days  -MD      Progress/Outcomes (Transfer Goal 2, PT-IRF)  goal revised this date  -MD         Gait/Walking Locomotion Goal 1 (PT-IRF)    Progress/Outcomes (Gait/Walking Locomotion Goal 1, PT-IRF)  goal met  -MD        User Key  (r) = Recorded By, (t) = Taken By, (c) = Cosigned By    Initials Name Provider Type    Mira Vaca, PT Physical Therapist               Time Calculation:     PT Charges     Row Name 08/15/19 1420 08/15/19 0848          Time Calculation    Start Time  1400  -MD  0830  -MD     Stop Time  1430  -MD  0900  -MD     Time Calculation (min)  30 min  -MD  30 min  -MD     PT Received On  --  08/15/19  -MD     PT - Next Appointment  --  08/16/19  -MD     PT Goal Re-Cert Due Date  --  08/22/19  -MD       User Key  (r) = Recorded By, (t) = Taken By, (c) = Cosigned By    Initials Name Provider Type    Mira Vaca, PT Physical Therapist          Therapy Charges for Today     Code Description Service Date Service Provider Modifiers Qty    85035724065 HC PT THER PROC EA 15 MIN 8/15/2019 Mira Chadwick, PT GP 4                   Mira Chadwick PT  8/15/2019

## 2019-08-15 NOTE — PROGRESS NOTES
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    Again appears anxious related to her aphasia and difficulty expressing herself.    Does not indicate any pain complaints.      History taken from: patient chart RN    Objective     Vital Signs  Temp:  [98 °F (36.7 °C)-98.7 °F (37.1 °C)] 98.6 °F (37 °C)  Heart Rate:  [67-76] 76  Resp:  [17-18] 17  BP: (156-163)/(63-72) 161/63    Objective  Physical Exam  MENTAL STATUS -  AWAKE / ALERT.  Anxious.  HEENT- NCAT,   SCLERA NON-ICTERIC, CONJUNCTIVA PINK, OP MOIST, NO JVD, EARS UNREMARKABLE EXTERNALLY  LUNGS - normal respirations.  Clear to auscultation  HEART- RRR   ABD -     EXT - NO EDEMA OR CYANOSIS  NEURO -alert.  Aphasia.  She will get occasional single word but largely utterances. Rare automatic phrase  She will occasionally follow some simple commands at times, but inconsistent and requires much repetition...        MOTOR EXAM -takes resistance bilaterally.         Results Review:     I reviewed the patient's new clinical results.     CT HEAD - AUGUST 11, 2019  FINDINGS:  Old appearing lacunar type infarcts are again noted about the  right thalamus and basal ganglia regions. There are stable areas of  encephalomalacia in the left parietal and occipital lobes medially.  Generalized atrophy. There are moderately extensive scattered areas of  decreased density in the white  matter likely related to chronic ischemic  gliotic changes.    No hydrocephalus.    Glucose   Date/Time Value Ref Range Status   08/14/2019 1556 187 (H) 70 - 130 mg/dL Final   08/14/2019 1058 114 70 - 130 mg/dL Final   08/14/2019 0720 154 (H) 70 - 130 mg/dL Final   08/13/2019 2118 96 70 - 130 mg/dL Final   08/13/2019 1602 116 70 - 130 mg/dL Final   08/13/2019 1110 226 (H) 70 - 130 mg/dL Final   08/13/2019 0646 151 (H) 70 - 130 mg/dL Final   08/12/2019 2023 200 (H) 70 - 130 mg/dL Final     Results from last 7 days   Lab Units 08/14/19  0554 08/12/19  0645 08/09/19  0729   WBC 10*3/mm3 4.97 5.52 7.10   HEMOGLOBIN g/dL 9.0* 9.1* 9.6*   HEMATOCRIT % 28.9* 29.0* 30.0*   PLATELETS 10*3/mm3 258 263 315       Ref. Range 5/22/2019 05:44 5/22/2019 11:34 7/9/2019 08:25 7/18/2019 04:49 7/19/2019 05:05 7/23/2019 05:56 8/1/2019 06:48   Hemoglobin Latest Ref Range: 12.0 - 15.9 g/dL 10.8 (L)  10.6 (L) 8.5 (L) 9.7 (L) 9.3 (L) 7.4 (L)   Hematocrit Latest Ref Range: 34.0 - 46.6 % 35.1  33.4 (L) 26.2 (L) 29.9 (L) 28.6 (L) 23.6 (L)     Results from last 7 days   Lab Units 08/14/19  0554 08/12/19  0645 08/09/19  0729   SODIUM mmol/L 139 141 138   POTASSIUM mmol/L 3.7 3.3* 3.4*   CHLORIDE mmol/L 102 102 99   CO2 mmol/L 27.3 28.7 27.9   BUN mg/dL 19 18 15   CREATININE mg/dL 0.64 0.63 0.67   CALCIUM mg/dL 8.6 8.6 8.6   GLUCOSE mg/dL 152* 149* 168*         Ref. Range 8/1/2019 06:47   25 Hydroxy, Vitamin D Latest Ref Range: 30.0 - 100.0 ng/ml 21.8 (L)       Ref. Range 8/1/2019 06:47   Total Cholesterol Latest Ref Range: 0 - 200 mg/dL 105   HDL Cholesterol Latest Ref Range: 40 - 60 mg/dL 40   LDL Cholesterol  Latest Ref Range: 0 - 100 mg/dL 57   VLDL Cholesterol Latest Ref Range: 5 - 40 mg/dL 8   Triglycerides Latest Ref Range: 0 - 150 mg/dL 40   Medication Review: done  Scheduled Meds:    amLODIPine 5 mg Oral Nightly   apixaban 5 mg Oral Q12H   aspirin 325 mg Oral Daily   atorvastatin 80 mg Oral Nightly   brimonidine 1 drop Both Eyes BID    dorzolamide 1 drop Both Eyes BID   glipiZIDE 5 mg Oral BID AC   hydrALAZINE 50 mg Oral Q8H   insulin regular 0-14 Units Subcutaneous TID AC   lansoprazole 30 mg Oral BID AC   losartan-HCTZ (HYZAAR) 100-12.5 combo dose  Oral Daily   memantine 5 mg Oral Daily   metFORMIN 1,000 mg Oral BID With Meals   nebivolol 20 mg Oral BID   nystatin 5 mL Swish & Spit 4x Daily   potassium chloride 20 mEq Oral Daily With Breakfast   vitamin D 50,000 Units Oral Q7 Days     Continuous Infusions:   PRN Meds:.•  aluminum-magnesium hydroxide-simethicone  •  dextrose  •  dextrose  •  glucagon (human recombinant)  •  nitroglycerin      Assessment/Plan       Stroke (cerebrum) (CMS/[facility])      Assessment & Plan  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  If add Namenda, will probably discontinue amantadine as she continues alert.  August 11-she has some increased irritability at times.  This may be related to the underlying stroke deficits itself.  She does not appear to have any dysuria, no odor to her urine.  Staff reports that for the most part she is eating okay.  Will check a follow-up CT of the head to assess for any interval visual changes.  She is on aspirin and Eliquis for stroke prophylaxis.  She had noticeably improved alertness when amantadine was started.  She is readily alert now.  This may be related to natural recovery in terms of her level of arousal at this point.  Current plan is to discontinue the amantadine to see if that helps with her irritability and start  on Namenda for aphasia.  August 12 -  Patient with increased restlessness at times.   Continues aphasia. Not able to identify any complaint.  Appears anxious today. No change on CT head yesterday. Started on Namenda for aphasia on 8/12/19.   August 13- will continue to monitor on  recent med adjustments    Aphasia -  August 12 - trial of Namenda    Dysphagia-Cortrak feeding tube discontinued on July 31 and started on puréed/honey thick liquid diet with strategies  August 7-advance to fork mash nectar thick liquid diet, consistent carbohydrate.  August 14 - repeat VFSS to assess for advancing to thin liquids    History of multiple bilateral strokes and left central retinal artery occlusion    History of left greater than right internal carotid artery stenosis-status post left internal carotid artery stent July 17, 2019    History of hypertension -  Hyzaar 100-25. August 4 - Bystolic increased to 20 mg daily to 20 mg bid.  August 8 - BP still runs high. On discharge in May 2019 was on Hyzaar 100-25 mg daily, Bystolic 20 mg daily, amlodipine 10 mg daily, Hydralazine 50 mg q 8 hours.  Will add Hydralazine initially 10 mg po q 8 hours and titrate up as needed.   August 9-systolic blood pressure elevated last night and early this morning but better this afternoon at 122-130.  Continue to follow recent addition of hydralazine and titrate up as needed  August 10-systolic blood pressure 175 this afternoon, range otherwise 122-142.  Will titrate up on hydralazine to 20 mg every 8 hours.  August 11-hypertension overall appears improved.  Will titrate up further to 25 mg every 8 hours.  August 12 - add amlodipine 5 mg daily.   August 14 - titrate up on hydralazine to 50 mg q 8 hours    History of diabetes mellitus -Lantus/glipizide (home medication metformin 1000 mg twice daily and glipizide 10 mg twice daily)  August 1-blood sugars were low yesterday afternoon after tube feeds discontinued.  Held glipizide last night and this morning and Lantus last night.  Blood sugar elevated at noontime today.  Will receive resume glipizide at a lower dose of 5 mg twice a day with meals.  Continue sliding scale insulin coverage.  She also is on metformin at home.  August 5-add back metformin initially at 500 mg twice  a day and continue glipizide 5 mg twice a day, both one half of previous home dose, titrate up as needed.  August 7-titrate up metformin to 850 mg twice a day.  Add consistent carbohydrate restriction to diet.  August 9-increase metformin to 1000 g twice a day.  August 10-blood glucose 340 last evening but 150-114-178 so far today  August 11-continue to follow blood sugar pattern and may increase glipizide further as current dose glipizide 5 mg twice a day along with metformin 1000 mg twice a day  August 14 - blood glucose  past 24 hours - monitor pattern.    Stroke prophylaxis-aspirin/Eliquis/atorvastatin    Anemia-August 1-hemoglobin 7.4.  Most recent check on July 23 HGB 9.3.  Will recheck hemoglobin this afternoon.  Hemoccult stool.  Iron studies.  Reticulocyte count.  Recheck CBC in the a.m.  Added Protonix.  Patient is on aspirin and Eliquis.  With recent stroke  will look to transfuse packed red blood cell.  August 2-hemoglobin improved 9.0-1 unit packed red blood cells.  Elevated reticulocyte count in response to anemia.  Nursing describes dark stool.  Patient discussed with gastroenterology.  At this point unable to do endoscopy as on Eliquis and aspirin.  Will treat symptomatically for now with increasing proton pump inhibitor and transfusing as needed.  August 5-anemia improved-hemoglobin 9.8    DVT prophylaxis-SCDs/anticoagulation    Impulsivity-   Nursing to do re-orientation with the patient.  Room close to the nursing station.    Endocrine-vitamin D deficiency-ergocalciferol 50,000 units weekly x8 weeks added. Vitamin B12 level and TSH checked in late May unremarkable     Impaired cognition/impaired language, impaired swallow, impaired activity daily living, impaired mobility     Now admit for comprehensive acute inpatient rehabilitation .  This would be an interdisciplinary program with physical therapy 1 hour,  occupational therapy 1 hour, and speech therapy 1 hour, 5 days a week.   Rehabilitation nursing for carryover, monitoring of cardiopulmonary and neurologic   status, bowel and bladder, and skin  Ongoing physician follow-up.  Weekly team conferences.  Goals are indeterminant.   Rehabilitation prognosis determined.  Medical prognosis determined.  Estimated length of stay is indeterminate.    TEAM CONF - AUGUST 6- TRANFERS CTG. GAIT UP  FEET CTG-MIN ASSIST. UBD MIN. LBD MOD. BATH MOD. SEVERE APHASIA. INCREASED RESISTANCE TO PARTICIPATE AT TIMES.   PUREE/HTL.  GOOD PO INTAKE. ADJUSTING MEDS FOR DIABETES MELLITUS.  BLADDER CONTINENT/INCONTIENT.   ELOS- 2 WEEKS.     TEAM CONF - AUGUST 13 - DID GREAT WITH PT ON Saturday, FOLLOWING COMMANDS AND BATTING A BALL WITH ANOTHER PATIENT. YESTERDAY, VERY FRUSTRATED AND IRRITABLE.  FRUSTRATED BY APHASIA, TRYING TO TELL STAFF SOMETHING. WILL HOLD ON TO OBJECTS, REPEAT SINGLE WORD.   BED CTG. 4 STAIRS CTG.  GAIT 220 FEET CTG-SBA NO DEVICE.  TOILET TRANSFERS CTG-MIN.  GROOMING MIN.  UBD MIN ASSIST FOR BRA, LBD CTG-MIN. BATH CTG-MIN. COORDINATION RUE BETTER.  DYSPHAGIA - IMPROVED - FORK TALA, NTL. DO NOT FEEL SHE WOULD TOLERATE E-STIM. RECEPTIVE LANGUAGE IMPROVED SLIGHTLY , POINTING TO OBJECTS. EXPRESSIVELY PERSEVERATIVE ON A SINGLE WORD, TRIES TO USE PICTURE BOARD TO COMMUNICATE. YES/NO NOT ACCURATE.  CONTINENT BOWEL AND BLADDER, TIMED VOIDS. SKIN INTACT. TYPICALLY GOOD INTAKE.  ELOS - 1.5 WEEKS    AUGUST 14 - In therapies - In OT, increased participation noted with  present   Transfers SBA/CTG  Walked from therapy gym to room without AD SBA and vc's for directions back to the room   300' outdoors on sidewalk, incline; 300', 180', 100' up on rehab unit. Pt was able to find her room when she got close to it  Bathing, dressing, grooming min assist. Toileting moderate assist.  Pt participated in using R hand to manipualte round pegs into peg board by color with MIN difficutly once pt caught on to the task. with 3D block recreation with R hand  with 100% color match, 80% accuracy with block recreation  With SLP, patient was not able to effectively communicate her wants/needs but refused to go to therapy office by planting her heels while rolling in wc down the [place]; tx session completed in her room       Ajit Howard MD  08/14/19  8:16 PM    Time:

## 2019-08-15 NOTE — PROGRESS NOTES
Inpatient Rehabilitation Plan of Care Note    Plan of Care  Care Plan Reviewed - Updates as Follows    Body Systems    [RN] Integumentary(Active)  Current Status(08/15/2019): Incision L neck glued  Weekly Goal(08/22/2019): No S&S infection noted  Discharge Goal: No S&S infection noted Skin is intact.    Performed Intervention(s)  Daily skin inspection      Psychosocial    [RN] Coping/Adjustment(Active)  Current Status(08/15/2019): Pt. is non-verbal; Supportive   Weekly Goal(08/22/2019): Identify progress in functional status  Discharge Goal: Demonstrate healthy coping strategies    Performed Intervention(s)  Assist patient to express needs and concerns  calm enviroment      Safety    [RN] Potential for Injury(Active)  Current Status(08/15/2019): Impulsive; aphasic; does not use call light  Weekly Goal(08/22/2019): Cue to use call light  Discharge Goal: Pt/family will be aware of risk of fall and safety in the home  setting    Performed Intervention(s)  Bed alarm, wc alarm  Safety rounds  Items within reach      Sphincter Control    [RN] Bladder Management(Active)  Current Status(08/15/2019): incontinent bladder 50%  Weekly Goal(08/22/2019): continent 50%  Discharge Goal: continent 100%    [RN] Bowel Management(Active)  Current Status(08/15/2019): incontinent bowel 100%  Weekly Goal(08/22/2019): continent bowel 50%  Discharge Goal: continent bowel 100%    Performed Intervention(s)  Monitor intake and output  Encourage fluid intake  Elimination schedule    Signed by: Hina Hudson RN

## 2019-08-15 NOTE — PLAN OF CARE
Problem: Skin Injury Risk (Adult)  Goal: Skin Health and Integrity  Outcome: Ongoing (interventions implemented as appropriate)   08/15/19 1529   Skin Injury Risk (Adult)   Skin Health and Integrity making progress toward outcome       Problem: Fall Risk (Adult)  Goal: Absence of Fall  Outcome: Ongoing (interventions implemented as appropriate)   08/15/19 1529   Fall Risk (Adult)   Absence of Fall making progress toward outcome       Problem: Patient Care Overview  Goal: Plan of Care Review  Outcome: Ongoing (interventions implemented as appropriate)   08/15/19 1529   Patient Care Overview   IRF Plan of Care Review progress ongoing, continue   Progress, Functional Goals demonstrating adequate progress   Coping/Psychosocial   Plan of Care Reviewed With patient   OTHER   Outcome Summary Patient is confused and impulsive, takes medication crushed in pudding and is CGA. She can be continent and incontinent at times. She had a family conference today, please see notes. No other issues at this time.        Problem: Stroke (IRF) (Adult)  Goal: Promote Optimal Functional [place]  Outcome: Ongoing (interventions implemented as appropriate)   08/15/19 1529   Stroke (IRF) (Adult)   Promote Optimal Functional [place] demonstrating adequate progress

## 2019-08-16 LAB
ANION GAP SERPL CALCULATED.3IONS-SCNC: 9.8 MMOL/L (ref 5–15)
BASOPHILS # BLD AUTO: 0.06 10*3/MM3 (ref 0–0.2)
BASOPHILS NFR BLD AUTO: 1.1 % (ref 0–1.5)
BUN BLD-MCNC: 17 MG/DL (ref 8–23)
BUN/CREAT SERPL: 26.6 (ref 7–25)
CALCIUM SPEC-SCNC: 8.7 MG/DL (ref 8.6–10.5)
CHLORIDE SERPL-SCNC: 103 MMOL/L (ref 98–107)
CO2 SERPL-SCNC: 27.2 MMOL/L (ref 22–29)
CREAT BLD-MCNC: 0.64 MG/DL (ref 0.57–1)
DEPRECATED RDW RBC AUTO: 57.7 FL (ref 37–54)
EOSINOPHIL # BLD AUTO: 0.21 10*3/MM3 (ref 0–0.4)
EOSINOPHIL NFR BLD AUTO: 4 % (ref 0.3–6.2)
ERYTHROCYTE [DISTWIDTH] IN BLOOD BY AUTOMATED COUNT: 16.3 % (ref 12.3–15.4)
GFR SERPL CREATININE-BSD FRML MDRD: 90 ML/MIN/1.73
GLUCOSE BLD-MCNC: 137 MG/DL (ref 65–99)
GLUCOSE BLDC GLUCOMTR-MCNC: 138 MG/DL (ref 70–130)
GLUCOSE BLDC GLUCOMTR-MCNC: 159 MG/DL (ref 70–130)
GLUCOSE BLDC GLUCOMTR-MCNC: 200 MG/DL (ref 70–130)
GLUCOSE BLDC GLUCOMTR-MCNC: 98 MG/DL (ref 70–130)
HCT VFR BLD AUTO: 32.4 % (ref 34–46.6)
HGB BLD-MCNC: 9.8 G/DL (ref 12–15.9)
IMM GRANULOCYTES # BLD AUTO: 0.02 10*3/MM3 (ref 0–0.05)
IMM GRANULOCYTES NFR BLD AUTO: 0.4 % (ref 0–0.5)
LYMPHOCYTES # BLD AUTO: 1 10*3/MM3 (ref 0.7–3.1)
LYMPHOCYTES NFR BLD AUTO: 19.1 % (ref 19.6–45.3)
MCH RBC QN AUTO: 29 PG (ref 26.6–33)
MCHC RBC AUTO-ENTMCNC: 30.2 G/DL (ref 31.5–35.7)
MCV RBC AUTO: 95.9 FL (ref 79–97)
MONOCYTES # BLD AUTO: 0.4 10*3/MM3 (ref 0.1–0.9)
MONOCYTES NFR BLD AUTO: 7.6 % (ref 5–12)
NEUTROPHILS # BLD AUTO: 3.55 10*3/MM3 (ref 1.7–7)
NEUTROPHILS NFR BLD AUTO: 67.8 % (ref 42.7–76)
NRBC BLD AUTO-RTO: 0 /100 WBC (ref 0–0.2)
PLATELET # BLD AUTO: 275 10*3/MM3 (ref 140–450)
PMV BLD AUTO: 9.5 FL (ref 6–12)
POTASSIUM BLD-SCNC: 3.8 MMOL/L (ref 3.5–5.2)
RBC # BLD AUTO: 3.38 10*6/MM3 (ref 3.77–5.28)
SODIUM BLD-SCNC: 140 MMOL/L (ref 136–145)
WBC NRBC COR # BLD: 5.24 10*3/MM3 (ref 3.4–10.8)

## 2019-08-16 PROCEDURE — 97535 SELF CARE MNGMENT TRAINING: CPT

## 2019-08-16 PROCEDURE — 80048 BASIC METABOLIC PNL TOTAL CA: CPT | Performed by: PHYSICAL MEDICINE & REHABILITATION

## 2019-08-16 PROCEDURE — 63710000001 INSULIN LISPRO (HUMAN) PER 5 UNITS: Performed by: PHYSICAL MEDICINE & REHABILITATION

## 2019-08-16 PROCEDURE — 92526 ORAL FUNCTION THERAPY: CPT

## 2019-08-16 PROCEDURE — 97110 THERAPEUTIC EXERCISES: CPT

## 2019-08-16 PROCEDURE — 97112 NEUROMUSCULAR REEDUCATION: CPT

## 2019-08-16 PROCEDURE — 82962 GLUCOSE BLOOD TEST: CPT

## 2019-08-16 PROCEDURE — 92507 TX SP LANG VOICE COMM INDIV: CPT

## 2019-08-16 PROCEDURE — 85025 COMPLETE CBC W/AUTO DIFF WBC: CPT | Performed by: PHYSICAL MEDICINE & REHABILITATION

## 2019-08-16 RX ORDER — PAROXETINE 10 MG/1
10 TABLET, FILM COATED ORAL DAILY
Status: DISCONTINUED | OUTPATIENT
Start: 2019-08-16 | End: 2019-08-29 | Stop reason: HOSPADM

## 2019-08-16 RX ADMIN — BRIMONIDINE TARTRATE 1 DROP: 2 SOLUTION OPHTHALMIC at 07:41

## 2019-08-16 RX ADMIN — APIXABAN 5 MG: 5 TABLET, FILM COATED ORAL at 07:40

## 2019-08-16 RX ADMIN — NYSTATIN 500000 UNITS: 100000 SUSPENSION ORAL at 11:51

## 2019-08-16 RX ADMIN — PAROXETINE HYDROCHLORIDE 10 MG: 10 TABLET, FILM COATED ORAL at 13:07

## 2019-08-16 RX ADMIN — HYDRALAZINE HYDROCHLORIDE 50 MG: 50 TABLET, FILM COATED ORAL at 13:07

## 2019-08-16 RX ADMIN — GLIPIZIDE 5 MG: 5 TABLET ORAL at 06:21

## 2019-08-16 RX ADMIN — DORZOLAMIDE HYDROCHLORIDE 1 DROP: 20 SOLUTION/ DROPS OPHTHALMIC at 07:41

## 2019-08-16 RX ADMIN — NYSTATIN 500000 UNITS: 100000 SUSPENSION ORAL at 07:41

## 2019-08-16 RX ADMIN — POTASSIUM CHLORIDE 20 MEQ: 1.5 POWDER, FOR SOLUTION ORAL at 07:39

## 2019-08-16 RX ADMIN — INSULIN LISPRO 3 UNITS: 100 INJECTION, SOLUTION INTRAVENOUS; SUBCUTANEOUS at 11:49

## 2019-08-16 RX ADMIN — NEBIVOLOL HYDROCHLORIDE 20 MG: 10 TABLET ORAL at 07:40

## 2019-08-16 RX ADMIN — LANSOPRAZOLE 30 MG: KIT at 06:22

## 2019-08-16 RX ADMIN — LANSOPRAZOLE 30 MG: KIT at 17:47

## 2019-08-16 RX ADMIN — HYDRALAZINE HYDROCHLORIDE 50 MG: 50 TABLET, FILM COATED ORAL at 06:21

## 2019-08-16 RX ADMIN — MEMANTINE HYDROCHLORIDE 5 MG: 5 TABLET, FILM COATED ORAL at 07:40

## 2019-08-16 RX ADMIN — NYSTATIN 500000 UNITS: 100000 SUSPENSION ORAL at 17:47

## 2019-08-16 RX ADMIN — GLIPIZIDE 5 MG: 5 TABLET ORAL at 17:47

## 2019-08-16 RX ADMIN — METFORMIN HYDROCHLORIDE 1000 MG: 1000 TABLET ORAL at 07:39

## 2019-08-16 RX ADMIN — METFORMIN HYDROCHLORIDE 1000 MG: 1000 TABLET ORAL at 17:47

## 2019-08-16 RX ADMIN — LOSARTAN POTASSIUM: 50 TABLET, FILM COATED ORAL at 07:40

## 2019-08-16 RX ADMIN — ASPIRIN 325 MG: 325 TABLET ORAL at 07:41

## 2019-08-16 NOTE — PLAN OF CARE
Problem: Skin Injury Risk (Adult)  Goal: Skin Health and Integrity  Outcome: Ongoing (interventions implemented as appropriate)      Problem: Fall Risk (Adult)  Goal: Absence of Fall  Outcome: Ongoing (interventions implemented as appropriate)      Problem: Patient Care Overview  Goal: Plan of Care Review  Outcome: Ongoing (interventions implemented as appropriate)      Problem: Stroke (IRF) (Adult)  Goal: Promote Optimal Functional [place]  Outcome: Ongoing (interventions implemented as appropriate)

## 2019-08-16 NOTE — PLAN OF CARE
Problem: Skin Injury Risk (Adult)  Goal: Skin Health and Integrity  Outcome: Ongoing (interventions implemented as appropriate)   08/16/19 1912   Skin Injury Risk (Adult)   Skin Health and Integrity making progress toward outcome       Problem: Fall Risk (Adult)  Goal: Absence of Fall  Outcome: Ongoing (interventions implemented as appropriate)   08/16/19 1912   Fall Risk (Adult)   Absence of Fall making progress toward outcome       Problem: Patient Care Overview  Goal: Plan of Care Review  Outcome: Ongoing (interventions implemented as appropriate)   08/16/19 1912   Patient Care Overview   IRF Plan of Care Review progress ongoing, continue   Progress, Functional Goals demonstrating adequate progress   Coping/Psychosocial   Plan of Care Reviewed With patient   OTHER   Outcome Summary Patient more confused today and very impulsive. She has been incontinent of b/b, takes medication crushed in pudding or applesauce, and is an accucheck AC/HS.       Problem: Stroke (IRF) (Adult)  Goal: Promote Optimal Functional [place]  Outcome: Ongoing (interventions implemented as appropriate)   08/16/19 1912   Stroke (IRF) (Adult)   Promote Optimal Functional [place] demonstrating adequate progress

## 2019-08-16 NOTE — PROGRESS NOTES
Inpatient Rehabilitation Functional Measures Assessment    Functional Measures  KATI Eating:  [facility] Grooming: [facility] Bathing:  [facility] Upper Body Dressing:  [facility] Lower Body Dressing:  [facility] Toileting:  [facility] Bladder Management  Level of Assistance:  [place]  Frequency/Number of Accidents this Shift:  [facility] Bowel Management  Level of Assistance: [place]  Frequency/Number of Accidents this Shift: [place]    [facility] Bed/Chair/Wheelchair Transfer:  Bed/chair/wheelchair Transfer Score = 5.  Patient is supervision/set-up for transferring to and from the  bed/chair/wheelchair, requiring: Stand by assistance. Patient requires the  following assistive device(s): Bed rails.  KATI Toilet Transfer:  [facility] Tub/Shower Transfer:  [place]    Previously Documented Mode of Locomotion at Discharge: [place]  KATI Expected Mode of Locomotion at Discharge: [facility] Walk/Wheelchair:  WHEELCHAIR OBSERVATION   Activity was not observed.    WALK OBSERVATION   Walk Distance Scale = 3.  Distance walked is greater than 150 feet. Walk Score  = 5.  Patient requires supervision or set up for walking. Stand by assistance.  Patient walked a distance of  250 feet. No assistive devices were required.  KATI Stairs:  Stairs Score = 2.  Incidental assistance with lifting or lowering,  contact guard or steadying was provided. Patient performs 75% or more of effort  and requires minimal contact assistance. Patient negotiated  8 stairs. Patient  requires the following assistive device(s): Handrail(s).    KATI Comprehension:  [facility] Expression:  [facility] Social Interaction:  [facility] Problem Solving:  [facility] Memory:  [place]    Therapy Mode Minutes  Occupational Therapy: [place]  Physical Therapy: Individual: 60 minutes.  Speech Language Pathology:  [place]    Signed by: Mira Chadwick PT

## 2019-08-16 NOTE — PROGRESS NOTES
Occupational Therapy: [place]    Physical Therapy: [place]    Speech Language Pathology:  Individual: 60 minutes.    Signed by: Ingris Gomez, SLP

## 2019-08-16 NOTE — PROGRESS NOTES
Inpatient Rehabilitation Plan of Care Note    Plan of Care  Care Plan Reviewed - No updates at this time.    Psychosocial    Performed Intervention(s)  Assist patient to express needs and concerns  calm enviroment      Safety    Performed Intervention(s)  Bed alarm, wc alarm  Safety rounds  Items within reach      Body Systems    Performed Intervention(s)  Daily skin inspection      Sphincter Control    Performed Intervention(s)  Monitor intake and output  Encourage fluid intake  Elimination schedule    Signed by: Eryn Yan RN

## 2019-08-16 NOTE — PROGRESS NOTES
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    She will have frustration related to her difficulty expressing herself.  Does not indicate any pain complaints.  She will be able to get occasional single word for the examiner.  Will perseverate on \"no \"response   She does follow instructions.  Patient was reviewed with her daughter today including rehabilitation goals, discharge planning, and recent medication adjustment to try to help with her anxiety/frustration with her aphasia.  Reviewed with the daughter that on discussion with the team is felt that she would do better with her functional status/aphasia/anxiety if able to be discharged to home environment with more frequent interactions but if that is not feasible with the need to look at subacute options.      History taken from: patient chart RN    Objective     Vital Signs  Temp:  [97.6 °F (36.4 °C)-98.3 °F (36.8 °C)] 98.2 °F (36.8 °C)  Heart Rate:  [70-85] 75  Resp:  [18] 18  BP: (110-175)/(58-81) 110/58    Objective  Physical Exam  MENTAL STATUS -  AWAKE / ALERT.  Anxious.  HEENT- NCAT,   SCLERA NON-ICTERIC, CONJUNCTIVA PINK, OP MOIST, NO JVD, EARS UNREMARKABLE EXTERNALLY  LUNGS - normal respirations.  Clear to auscultation  HEART- RRR   ABD -     EXT - NO EDEMA OR CYANOSIS  NEURO -alert.  Aphasia.  She will get occasional single word but largely utterances.  Did " not do any automatic phrase today.  She will occasionally follow some simple commands at times, but inconsistent and requires much repetition...        MOTOR EXAM -takes resistance bilaterally.         Results Review:     I reviewed the patient's new clinical results.     CT HEAD - AUGUST 11, 2019  FINDINGS:  Old appearing lacunar type infarcts are again noted about the  right thalamus and basal ganglia regions. There are stable areas of  encephalomalacia in the left parietal and occipital lobes medially.  Generalized atrophy. There are moderately extensive scattered areas of  decreased density in the white matter likely related to chronic ischemic  gliotic changes.    No hydrocephalus.    Glucose   Date/Time Value Ref Range Status   08/16/2019 1608 98 70 - 130 mg/dL Final   08/16/2019 1109 200 (H) 70 - 130 mg/dL Final   08/16/2019 0619 138 (H) 70 - 130 mg/dL Final   08/15/2019 2056 146 (H) 70 - 130 mg/dL Final   08/15/2019 1626 70 70 - 130 mg/dL Final   08/15/2019 1616 68 (L) 70 - 130 mg/dL Final   08/15/2019 1057 245 (H) 70 - 130 mg/dL Final   08/15/2019 0720 148 (H) 70 - 130 mg/dL Final     Results from last 7 days   Lab Units 08/16/19  0710 08/14/19  0554 08/12/19  0645   WBC 10*3/mm3 5.24 4.97 5.52   HEMOGLOBIN g/dL 9.8* 9.0* 9.1*   HEMATOCRIT % 32.4* 28.9* 29.0*   PLATELETS 10*3/mm3 275 258 263       Ref. Range 5/22/2019 05:44 5/22/2019 11:34 7/9/2019 08:25 7/18/2019 04:49 7/19/2019 05:05 7/23/2019 05:56 8/1/2019 06:48   Hemoglobin Latest Ref Range: 12.0 - 15.9 g/dL 10.8 (L)  10.6 (L) 8.5 (L) 9.7 (L) 9.3 (L) 7.4 (L)   Hematocrit Latest Ref Range: 34.0 - 46.6 % 35.1  33.4 (L) 26.2 (L) 29.9 (L) 28.6 (L) 23.6 (L)     Results from last 7 days   Lab Units 08/16/19  0710 08/14/19  0554 08/12/19  0645   SODIUM mmol/L 140 139 141   POTASSIUM mmol/L 3.8 3.7 3.3*   CHLORIDE mmol/L 103 102 102   CO2 mmol/L 27.2 27.3 28.7   BUN mg/dL 17 19 18   CREATININE mg/dL 0.64 0.64 0.63   CALCIUM mg/dL 8.7 8.6 8.6   GLUCOSE mg/dL  137* 152* 149*         Ref. Range 8/1/2019 06:47   25 Hydroxy, Vitamin D Latest Ref Range: 30.0 - 100.0 ng/ml 21.8 (L)       Ref. Range 8/1/2019 06:47   Total Cholesterol Latest Ref Range: 0 - 200 mg/dL 105   HDL Cholesterol Latest Ref Range: 40 - 60 mg/dL 40   LDL Cholesterol  Latest Ref Range: 0 - 100 mg/dL 57   VLDL Cholesterol Latest Ref Range: 5 - 40 mg/dL 8   Triglycerides Latest Ref Range: 0 - 150 mg/dL 40   Medication Review: done  Scheduled Meds:    amLODIPine 5 mg Oral Nightly   apixaban 5 mg Oral Q12H   aspirin 325 mg Oral Daily   atorvastatin 80 mg Oral Nightly   brimonidine 1 drop Both Eyes BID   dorzolamide 1 drop Both Eyes BID   glipiZIDE 5 mg Oral BID AC   hydrALAZINE 50 mg Oral Q8H   insulin lispro 0-7 Units Subcutaneous 4x Daily With Meals & Nightly   lansoprazole 30 mg Oral BID AC   losartan-HCTZ (HYZAAR) 100-12.5 combo dose  Oral Daily   memantine 5 mg Oral Daily   metFORMIN 1,000 mg Oral BID With Meals   nebivolol 20 mg Oral BID   nystatin 5 mL Swish & Spit 4x Daily   PARoxetine 10 mg Oral Daily   potassium chloride 20 mEq Oral Daily With Breakfast   vitamin D 50,000 Units Oral Q7 Days     Continuous Infusions:   PRN Meds:.•  aluminum-magnesium hydroxide-simethicone  •  dextrose  •  dextrose  •  glucagon (human recombinant)  •  nitroglycerin      Assessment/Plan       Stroke (cerebrum) (CMS/HCC)      Assessment & Plan  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  If add Namenda, will probably discontinue amantadine as she continues alert.  August 11-she has some increased irritability at times.  This may be related to the underlying stroke deficits itself.  She does not appear to have any dysuria, no odor to her urine.  Staff reports that for the most part she is eating okay.  Will check a follow-up CT of the head to  assess for any interval visual changes.  She is on aspirin and Eliquis for stroke prophylaxis.  She had noticeably improved alertness when amantadine was started.  She is readily alert now.  This may be related to natural recovery in terms of her level of arousal at this point.  Current plan is to discontinue the amantadine to see if that helps with her irritability and start  on Namenda for aphasia.  August 12 -  Patient with increased restlessness at times.   Continues aphasia. Not able to identify any complaint.  Appears anxious today. No change on CT head yesterday. Started on Namenda for aphasia on 8/12/19.   August 13- will continue to monitor on recent med adjustments   August 15-She continues with expressive language deficits.    Again appears anxious related to her aphasia and difficulty expressing herself.    She will be able to get occasional single word for the examiner.  She does follow instructions.  The patient was reviewed with .  Discussed adding on an antidepressant with anxiolytic properties -Paxil-as it appears frustration from her aphasia with associated anxiety with the frustration affects her performance.  We will plan on starting Paxil 10 mg daily tomorrow and monitor response.  Reviewed with the patient's  that would not add a short acting anxiolytic (such as a benzodiazepine or Seroquel) as it may affect her cognitive performance.    August 16-started Paxil 10 mg daily    Aphasia -  August 12 - trial of Namenda  August 16-continue to observe on Namenda 5 mg daily for her aphasia and may possibly titrate up next week    Dysphagia-Cortrak feeding tube discontinued on July 31 and started on puréed/honey thick liquid diet with strategies  August 7-advance to fork mash nectar thick liquid diet, consistent carbohydrate.  August 14 - repeat VFSS to assess for advancing to thin liquids  August 15-Video fluoroscopic swallow study today-mechanical soft, no mixed consistency, nectar  thick liquid, water protocol between meals, no straws.    History of multiple bilateral strokes and left central retinal artery occlusion    History of left greater than right internal carotid artery stenosis-status post left internal carotid artery stent July 17, 2019    History of hypertension -  Hyzaar 100-25. August 4 - Bystolic increased to 20 mg daily to 20 mg bid.  August 8 - BP still runs high. On discharge in May 2019 was on Hyzaar 100-25 mg daily, Bystolic 20 mg daily, amlodipine 10 mg daily, Hydralazine 50 mg q 8 hours.  Will add Hydralazine initially 10 mg po q 8 hours and titrate up as needed.   August 9-systolic blood pressure elevated last night and early this morning but better this afternoon at 122-130.  Continue to follow recent addition of hydralazine and titrate up as needed  August 10-systolic blood pressure 175 this afternoon, range otherwise 122-142.  Will titrate up on hydralazine to 20 mg every 8 hours.  August 11-hypertension overall appears improved.  Will titrate up further to 25 mg every 8 hours.  August 12 - add amlodipine 5 mg daily.   August 14 - titrate up on hydralazine to 50 mg q 8 hours  August 15-blood pressure improved this afternoon with systolic blood pressure in the 120s-follow on current regimen  August 16-blood pressure range 110////59-will continue to monitor on present regimen    History of diabetes mellitus -Lantus/glipizide (home medication metformin 1000 mg twice daily and glipizide 10 mg twice daily)  August 1-blood sugars were low yesterday afternoon after tube feeds discontinued.  Held glipizide last night and this morning and Lantus last night.  Blood sugar elevated at noontime today.  Will receive resume glipizide at a lower dose of 5 mg twice a day with meals.  Continue sliding scale insulin coverage.  She also is on metformin at home.  August 5-add back metformin initially at 500 mg twice a day and continue glipizide 5 mg twice a day, both one  half of previous home dose, titrate up as needed.  August 7-titrate up metformin to 850 mg twice a day.  Add consistent carbohydrate restriction to diet.  August 9-increase metformin to 1000 g twice a day.  August 10-blood glucose 340 last evening but 150-114-178 so far today  August 11-continue to follow blood sugar pattern and may increase glipizide further as current dose glipizide 5 mg twice a day along with metformin 1000 mg twice a day  August 14 - blood glucose  past 24 hours - monitor pattern.  August 15-blood glucose [phone]--778-66-continue to follow pattern.  Will change sliding scale insulin coverage to Humalog at a lower dose.  She was started on the regular insulin sliding scale when she was on tube feeds.  August 16 recent blood glucose -253-138    Stroke prophylaxis-aspirin/Eliquis/atorvastatin    Anemia-August 1-hemoglobin 7.4.  Most recent check on July 23 HGB 9.3.  Will recheck hemoglobin this afternoon.  Hemoccult stool.  Iron studies.  Reticulocyte count.  Recheck CBC in the a.m.  Added Protonix.  Patient is on aspirin and Eliquis.  With recent stroke  will look to transfuse packed red blood cell.  August 2-hemoglobin improved 9.0-1 unit packed red blood cells.  Elevated reticulocyte count in response to anemia.  Nursing describes dark stool.  Patient discussed with gastroenterology.  At this point unable to do endoscopy as on Eliquis and aspirin.  Will treat symptomatically for now with increasing proton pump inhibitor and transfusing as needed.  August 5-anemia improved-hemoglobin 9.8  August 16-hemoglobin unchanged 9.8    DVT prophylaxis-SCDs/anticoagulation    Impulsivity-   Nursing to do re-orientation with the patient.  Room close to the nursing station.    Endocrine-vitamin D deficiency-ergocalciferol 50,000 units weekly x8 weeks added. Vitamin B12 level and TSH checked in late May unremarkable     Impaired cognition/impaired language, impaired swallow, impaired  activity daily living, impaired mobility     Now admit for comprehensive acute inpatient rehabilitation .  This would be an interdisciplinary program with physical therapy 1 hour,  occupational therapy 1 hour, and speech therapy 1 hour, 5 days a week.  Rehabilitation nursing for carryover, monitoring of cardiopulmonary and neurologic   status, bowel and bladder, and skin  Ongoing physician follow-up.  Weekly team conferences.  Goals are indeterminant.   Rehabilitation prognosis determined.  Medical prognosis determined.  Estimated length of stay is indeterminate.    TEAM CONF - AUGUST 6- TRANFERS CTG. GAIT UP  FEET CTG-MIN ASSIST. UBD MIN. LBD MOD. BATH MOD. SEVERE APHASIA. INCREASED RESISTANCE TO PARTICIPATE AT TIMES.   PUREE/HTL.  GOOD PO INTAKE. ADJUSTING MEDS FOR DIABETES MELLITUS.  BLADDER CONTINENT/INCONTIENT.   ELOS- 2 WEEKS.     TEAM CONF - AUGUST 13 - DID GREAT WITH PT ON Saturday, FOLLOWING COMMANDS AND BATTING A BALL WITH ANOTHER PATIENT. YESTERDAY, VERY FRUSTRATED AND IRRITABLE.  FRUSTRATED BY APHASIA, TRYING TO TELL STAFF SOMETHING. WILL HOLD ON TO OBJECTS, REPEAT SINGLE WORD.   BED CTG. 4 STAIRS CTG.  GAIT 220 FEET CTG-SBA NO DEVICE.  TOILET TRANSFERS CTG-MIN.  GROOMING MIN.  UBD MIN ASSIST FOR BRA, LBD CTG-MIN. BATH CTG-MIN. COORDINATION RUE BETTER.  DYSPHAGIA - IMPROVED - FORK TALA, NTL. DO NOT FEEL SHE WOULD TOLERATE E-STIM. RECEPTIVE LANGUAGE IMPROVED SLIGHTLY , POINTING TO OBJECTS. EXPRESSIVELY PERSEVERATIVE ON A SINGLE WORD, TRIES TO USE PICTURE BOARD TO COMMUNICATE. YES/NO NOT ACCURATE.  CONTINENT BOWEL AND BLADDER, TIMED VOIDS. SKIN INTACT. TYPICALLY GOOD INTAKE.  ELOS - 1.5 WEEKS    AUGUST 14 - In therapies - In OT, increased participation noted with  present   Transfers SBA/CTG  Walked from therapy gym to room without AD SBA and vc's for directions back to the room   300' outdoors on sidewalk, incline; 300', 180', 100' up on rehab unit. Pt was able to find her room when she got  close to it  Bathing, dressing, grooming min assist. Toileting moderate assist.  Pt participated in using R hand to manipualte round pegs into peg board by color with MIN difficutly once pt caught on to the task. with 3D block recreation with R hand with 100% color match, 80% accuracy with block recreation  With SLP, patient was not able to effectively communicate her wants/needs but refused to go to therapy office by planting her heels while rolling in wc down the [place]; tx session completed in her room     August 15-The patient was reviewed with .  Discussed adding on an antidepressant with anxiolytic properties -Paxil-as it appears frustration from her aphasia with associated anxiety with the frustration affects her performance.  We will plan on starting Paxil 10 mg daily tomorrow and monitor response.  Reviewed with the patient's  that would not add a short acting anxiolytic (such as a benzodiazepine or Seroquel) as it may affect her cognitive performance.    August 16-Patient was reviewed with her daughter today including rehabilitation goals, discharge planning, and recent medication adjustment to try to help with her anxiety/frustration with her aphasia.  Reviewed with the daughter that on discussion with the team is felt that she would do better with her functional status/aphasia/anxiety if able to be discharged to home environment with more frequent interactions but if that is not feasible with the need to look at subacute options.  Ajit Howard MD  08/16/19  4:51 PM    Time:      >35 minutes with > 50% face-to-face / floor time / coordination of care

## 2019-08-16 NOTE — THERAPY TREATMENT NOTE
Inpatient Rehabilitation - Speech Language Pathology Treatment Note    [facility]       Patient Name: Darby Workman  : 1945  MRN: [id number]    Today's Date: 2019           Admit Date: 2019      Visit Dx:      No diagnosis found.    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)   • Right-sided headache   • Acute ischemic stroke (CMS/HCC)   • Embolic stroke (CMS/HCC)   • Cerebral infarction due to stenosis of left carotid artery (CMS/HCC)   • Anticoagulated by anticoagulation treatment   • Stroke (cerebrum) (CMS/HCC)          Therapy Treatment    Evaluation/Coping    Evaluation/Treatment Time and Intent  Subjective Information: no complaints (19 1400 : Ingris Gomez, MS CCC-SLP)  Existing Precautions/Restrictions: fall, other (see comments)(swallow) (19 1400 : Ingris Gomez, MS CCC-SLP)  Document Type: therapy note (daily note) (19 1400 : Ingris Gomez, MS CCC-SLP)  Mode of Treatment: speech-language pathology (19 1400 : Ingris Gomez, MS CCC-SLP)  Patient/Family Observations: Pt restless and irritated both sessions. Daughter present in am. (19 1400 : Ingris Gomez, MS CCC-SLP)    Vitals/Pain/Safety         Cognition/Communication         Oral Motor/Eating         Mobility/Basic Activities/Instrumental Activities/Motor/Modality                   ROM/MMT                   Sensory/Myotome/Dermatome/Edema               Posture/Balance/Special Tests/Exercise/Transportation/Sexual Function                   Orthotics/Residual Limb/Prosthetic Management              Outcome Summary         EDUCATION    The patient has been educated in the following areas:     Cognitive Impairment Communication Impairment Dysphagia (Swallowing Impairment) Oral Care/Hydration Modified Diet Instruction.    SLP Recommendation and  Plan                                                          SLP GOALS     Row Name 08/16/19 1400 08/15/19 0930 08/14/19 1100       Oral Nutrition/Hydration Goal 1 (SLP)    Oral Nutrition/Hydration Goal 1, SLP  Pt observed at meal with [facility] soft and NTL. Pt showed no overt s/s, however, needed extra time to clear R cheek of food. With time pt cleared. Pt irritated with staff trying to assist and check oral cavity. Education completed with pt and daughter re: diet, how to thicken liquids, water protocol, risks, and strategies. Written info provided to supplement.  -AW  --  --       Oral Nutrition/Hydration Goal 2 (SLP)    Barriers (Oral Nutrition/Hydration Goal 2, SLP)  --  --  No overt s/s pen/asp with TL via cup throughout session.  -KB    Progress/Outcomes (Oral Nutrition/Hydration Goal 2, SLP)  --  goal met  -SA  good progress toward goal;goal ongoing  -KB       Oral Nutrition/Hydration Goal (SLP)    Oral Nutrition/Hydration Goal, SLP  --  Pt will tolerate mechanical soft, no mixed consistencies, nectar thick  -SA  --    Time Frame (Oral Nutrition/Hydration Goal, SLP)  --  by discharge  -SA  --       Words/Phrases/Sentences Goal 1 (SLP)    Progress (Ability to Contruct Words/Phrases/Sentences Goal 1, SLP)  --  --  with minimal cues (75-90%);with maximum cues (25-49%)  -KB    Progress/Outcomes (Identify Objects and Pictures Goal 1, SLP)  --  --  goal ongoing  -KB    Comment (Words/Phrases/Sentences Goal 1, SLP)  --  --  30% identifying named body parts, 80% following direct physical model  -KB       Comprehend Questions Goal 1 (SLP)    Improve Ability to Comprehend Questions Goal 1 (SLP)  simple yes/no questions  -AW  --  --    Time Frame (Comprehend Questions Goal 1, SLP)  by discharge  -AW  --  --    Progress (Ability to Comprehend Questions Goal 1, SLP)  20%  -AW  --  --    Progress/Outcomes (Comprehend Questions Goal 1, SLP)  goal ongoing  -AW  --  --    Comment (Comprehend Questions Goal 1, SLP)  repetitions  needed; difficult to determine level of comprehension, yes preference noted.  -AW  --  --       Follow Directions Goal 2 (SLP)    Improve Ability to Follow Directions Goal 1 (SLP)  1 step direction without objects  -AW  --  --    Time Frame (Follow Directions Goal 1, [facility])  by discharge  -AW  --  --    Progress (Ability to Follow Directions Goal 1, [facility])  10%;independently (over 90% accuracy);40%;with maximum cues (25-49%)  -AW  --  --    Progress/Outcomes (Follow Directions Goal 1, SLP)  goal ongoing  -AW  --  --       Word Retrieval Skills Goal 1 (SLP)    Improve Word Retrieval Skills By Goal 1 (SLP)  completing automatic speech task, counting  -AW  --  --    Time Frame (Word Retrieval Goal 1, SLP)  by discharge  -AW  --  --    Progress (Word Retrieval Skills Goal 1, SLP)  40%;with maximum cues (25-49%)  -AW  --  with moderate cues (50-74%);with maximum cues (25-49%)  -KB    Progress/Outcomes (Word Retrieval Goal 1, SLP)  goal ongoing  -AW  --  goal ongoing  -KB    Comment (Word Retrieval Goal 1, SLP)  counting 1-10 with SLP  -AW  --  10%, 10%, 20% across 3 trials of reciting alphabet, able to follow svetlana but most letter names were inaccurate; 20%, 20%, 50% counting 1-10 with unison model, verbal cues faded on third attempt  -KB       Word Retrieval Skills Goal 2 (SLP)    Progress (Word Retrieval Skills Goal 2, SLP)  --  --  with maximum cues (25-49%);with 1:1 supervision/constant cues  -KB    Progress/Outcomes (Word Retrieval Goal 2, SLP)  --  --  goal ongoing  -KB    Comment (Word Retrieval Goal 2, SLP)  --  --  0% imitation of short words given object stimuli, 50% with maximal cueing including direct verbal model with visual cues for accurate sound production, approximations and semantic paraphasias noted  -       Motor Planning Goal 1 (SLP)    Improve Motor Planning to Reduce Apraxia by Goal 1 (SLP)  imitating vowels  -AW  --  --    Time Frame (Motor Planning Goal 1, SLP)  by discharge  -AW  --  --     Progress (Motor Planning Goal 1, SLP)  90%;with maximum cues (25-49%)  -AW  --  --    Progress/Outcomes (Motor Planning Goal 1, SLP)  goal ongoing  -AW  --  --    Comment (Motor Planning Goal 1, SLP)  pt able to imitate vowel sounds w/ max cues  -AW  --  --       Planning and Execution of Connected Speech Goal 1 (SLP)    Improve Planning and Execution of Connected Speech by Goal 1 (SLP)  imitating one syllable words  -AW  --  --    Time Frame (Planning and Execution of Connected Speech Goal 1, SLP)  by discharge  -AW  --  --    Progress (Planning and Execution of Connected Speech Goal 1, SLP)  50%;with maximum cues (25-49%)  -AW  --  with maximum cues (25-49%)  -KB    Progress/Outcomes (Planning and Execution of Connected Speech Goal 1, SLP)  goal ongoing  -AW  --  goal ongoing  -KB    Comment (Planning and Execution of Connected Speech Goal 1, SLP)  function words  -AW  --  30% direct imitation of functional CV words, 80% with additional verbal modeling and visual cues, 50% approximations noted  -KB       Augmentative/Alternative Communication Objectives Goal 1 (SLP    Progress (Augmentative/Alternative Communication Goal 1, SLP)  --  --  with 1:1 supervision/constant cues  -KB    Progress/Outcomes (Augmentative/Alternative Communication Goal 1, SLP)  --  --  goal ongoing;progress slower than expected  -KB    Comment (Augmentative/Alternative Communication Goal 1, SLP)  --  --  Attempted use of basic picture communication board to understand patient's want/need in regards to refusing therapy in tx office; patient was unable to effectively communicate a want/need using pistures thought she looked at the page and appeared to be considering the options  -KB    Row Name 08/14/19 6930             Oral Nutrition/Hydration Goal 2 (SLP)    Barriers (Oral Nutrition/Hydration Goal 2, SLP)  No overt s/s pen/asp with TL via cup throughout session.  -KB      Progress/Outcomes (Oral Nutrition/Hydration Goal 2, SLP)  good  "progress toward goal;goal ongoing  -KB         Words/Phrases/Sentences Goal 1 (SLP)    Progress (Ability to Contruct Words/Phrases/Sentences Goal 1, SLP)  independently (over 90% accuracy)  -KB      Progress/Outcomes (Identify Objects and Pictures Goal 1, SLP)  good progress toward goal;goal ongoing  -KB      Comment (Words/Phrases/Sentences Goal 1, SLP)  100% identifying pictured objects by name in fo2  -KB         Reading Comprehension of Basic Signs and Letters Goal 1 (SLP)    Progress (Reading Comprehension of Basic Signs and Letters Goal 1, SLP)  with 1:1 supervision/constant cues  -KB      Progress/Outcomes (Reading Comprehension of Basic Signs and Letters Goal 1, SLP)  goal ongoing  -KB      Comment (Reading Comprehension of Basic Signs and Letters Goal 1, SLP)  attempted matching 2-3 digit numbers to like number in fo4, patient completed with 0% accuracy across 5 attempts despite maximal cueing  -KB         Graphic Expression of Shapes, Letters, Numbers Goal 1 (SLP)    Progress (Graphic Expression of Shapes, Letters, and Numbers Goal 1, SLP)  with moderate cues (50-74%)  -KB      Progress/Outcomes (Graphic Expression of Shapes, Letters, and Numbers Goal 1, SLP)  goal ongoing  -KB      Comment (Graphic Expression of Shapes, Letters, and Numbers Goal 1, SLP)  69% copying single letters, discontinued task when patient perseverated on writing \"k\" x4  -KB        User Key  (r) = Recorded By, (t) = Taken By, (c) = Cosigned By    Initials Name Provider Type    Carla Yoo, MS CCC-SLP Speech and Language Pathologist    Ingris White, MS CCC-SLP Speech and Language Pathologist    KB Bruton, Katherine L Speech and Language Pathologist             SLP Outcome Measures (last 72 hours)      SLP Outcome Measures     Row Name 08/15/19 1300             SLP Outcome Measures    Outcome Measure Used?  Adult NOMS  -SA         Adult FCM Scores    Swallowing FCM Score  4  -SA        User Key  (r) = Recorded By, (t) = Taken " By, (c) = Cosigned By    Initials Name Effective Dates    Carla Yoo MS CCC-SLP 03/07/18 -               Time Calculation:       Time Calculation- SLP     Row Name 08/16/19 1734             Time Calculation- SLP    SLP Start Time  1130  -AW      SLP Stop Time  1200  -AW      SLP Time Calculation (min)  30 min  -AW        User Key  (r) = Recorded By, (t) = Taken By, (c) = Cosigned By    Initials Name Provider Type    Ingris White, MS CCC-SLP Speech and Language Pathologist            Therapy Charges for Today     Code Description Service Date Service Provider Modifiers Qty    47344689216 HC ST TREATMENT SWALLOW 2 8/16/2019 Ingris Gomez MS CCC-SLP GN 1    [id number] HC ST TREATMENT SPEECH 2 8/16/2019 Ingris Gomez, MS CCC-SLP GN 1               ADULT NOMS (last 72 hours)      Adult NOMS     Row Name 08/15/19 1300                   Adult FCM Scores    Swallowing FCM Score  4  -          User Key  (r) = Recorded By, (t) = Taken By, (c) = Cosigned By    Initials Name Effective Dates    Carla Yoo MS CCC-SLP 03/07/18 -                      Ingris Gomez MS CCC-SLP  8/16/2019

## 2019-08-16 NOTE — PROGRESS NOTES
Inpatient Rehabilitation Functional Measures Assessment    Functional Measures  KATI Eating:  [facility] Grooming: [facility] Bathing:  [facility] Upper Body Dressing:  [facility] Lower Body Dressing:  [facility] Toileting:  [facility] Bladder Management  Level of Assistance:  [place]  Frequency/Number of Accidents this Shift:  [facility] Bowel Management  Level of Assistance: [place]  Frequency/Number of Accidents this Shift: [facility] Bed/Chair/Wheelchair Transfer:  [facility] Toilet Transfer:  [facility] Tub/Shower Transfer:  [place]    Previously Documented Mode of Locomotion at Discharge: Field  KATI Expected Mode of Locomotion at Discharge: [facility] Walk/Wheelchair:  [facility] Stairs:  [facility] Comprehension:  Auditory comprehension is the usual mode. Comprehension  Score = 6, Modified [place].  Patient comprehends complex/abstract  information in their primary language, requiring:  KATI Expression:  Vocal expression is the usual mode. Expression Score = 6,  Modified Independent.  Patient expresses complex/abstract information in their  primary language, requiring:  KATI Social Interaction:  Social Interaction Score = 6, Modified Independent.  Patient is modified independent for social interaction, requiring:  KATI Problem Solving:  Problem Solving Score = 6, Modified [place].  Patient  makes appropriate decisions in order to solve complex problems, but requires  extra time.  KATI Memory:  Memory Score = 6, Modified [place].  Patient is modified  independent for memory, requiring:    Therapy Mode Minutes  Occupational Therapy: [facility]  Physical Therapy: [facility]  Speech Language Pathology:  [facility]    Signed by: Luis Abernathy RN

## 2019-08-16 NOTE — THERAPY TREATMENT NOTE
Inpatient Rehabilitation - Occupational Therapy Treatment Note    [facility]     Patient Name: Darby Workman  : 1945  MRN: [id number]    Today's Date: 2019                 Admit Date: 2019      Visit Dx:  No diagnosis found.    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)   • Right-sided headache   • Acute ischemic stroke (CMS/HCC)   • Embolic stroke (CMS/HCC)   • Cerebral infarction due to stenosis of left carotid artery (CMS/HCC)   • Anticoagulated by anticoagulation treatment   • Stroke (cerebrum) (CMS/HCC)         Therapy Treatment    IRF Treatment Summary     Row Name 19 1514 19 0834          Evaluation/Treatment Time and Intent    Subjective Information  no complaints  -AF  no complaints  -MD     Existing Precautions/Restrictions  fall  -AF  fall  -MD     Document Type  therapy note (daily note)  -AF  therapy note (daily note)  -MD     Mode of Treatment  occupational therapy  -AF  physical therapy  -MD     Patient/Family Observations  pt sitting up in AM in dining room, in PM supine in bed, increased agitation in PM session, increased encouragment and redirection to decreased agiation  -AF  Pt sitting in WC showing no signs of acute distress.  Pt more aggitated this pm and very difficult to redirect  (Significant)   -MD     Recorded by [AF] Ingris Ansari, OTR [MD] Mira Chadwick, PT     Row Name 19 1514 19 0834          Cognition/Psychosocial- PT/OT    Affect/Mental Status (Cognitive)  confused  -AF  --     Orientation Status (Cognition)  unable/difficult to assess  -AF  unable/difficult to assess  -MD     Follows Commands (Cognition)  delayed response/completion;increased processing time needed;verbal cues/prompting required  -AF  delayed response/completion;increased processing time  needed;repetition of directions required;verbal cues/prompting required;25-49% accuracy  -MD     Personal Safety Interventions  fall prevention program maintained;gait belt;nonskid shoes/slippers when out of bed  -AF  fall prevention program maintained;gait belt  -MD     Attention Deficit (Cognitive)  moderate deficit  -AF  --     Memory Deficit (Cognitive)  unable/difficult to assess  -AF  --     Safety Deficit (Cognitive)  insight into deficits/self awareness;awareness of need for assistance  -AF  --     Recorded by [AF] Ingris Ansari OTR [MD] Mira Chadwick, PT     Row Name 08/16/19 1514             Bed Mobility Assessment/Treatment    Supine-Sit [place] (Bed Mobility)  supervision  -AF      Sit-Supine [place] (Bed Mobility)  supervision  -AF      Recorded by [AF] Ingris Ansari OTR      Row Name 08/16/19 1514             Functional Mobility    Functional Mobility- Comment  in room in PM session CGA/SBA without AD  -AF      Recorded by [AF] Ingris Ansari OTR      Row Name 08/16/19 0834             Transfer Assessment/Treatment    Comment (Transfers)  transfer from lower level of chair this am w SBA.  -MD      Recorded by [MD] Mira Chadwick, PT      Row Name 08/16/19 1514 08/16/19 0834          Sit-Stand Transfer    Sit-Stand [place] (Transfers)  stand by assist  -AF  stand by assist  -MD     Recorded by [AF] Ingris Ansari OTR [MD] Mira Chadwick, PT     Row Name 08/16/19 1514 08/16/19 0834          Stand-Sit Transfer    Stand-Sit [place] (Transfers)  stand by assist  -AF  stand by assist  -MD     Recorded by [AF] Ingris Ansari OTR [MD] Mira Chadwick, PT     Row Name 08/16/19 1514             Toilet Transfer    Type (Toilet Transfer)  stand pivot/stand step;stand-sit;sit-stand  -AF      [place] Level (Toilet Transfer)  contact guard;stand by assist;verbal cues  -AF      Assistive Device (Toilet Transfer)  commode;grab bars/safety frame in AM and PM  -AF      Recorded by [AF]  Ingris Ansari, R      Row Name 08/16/19 1514             Shower Transfer    Type (Shower Transfer)  stand pivot/stand step  -AF      [place] Level (Shower Transfer)  contact guard  -AF      Assistive Device (Shower Transfer)  grab bars/tub rail;wheelchair;tub bench  -AF      Recorded by [AF] Ingris Ansari, OTR      Row Name 08/16/19 0834             Gait/Stairs Assessment/Training    [place] Level (Gait)  stand by assist  -MD      Distance in Feet (Gait)  200`x4  -MD      Pattern (Gait)  step-through  -MD      Deviations/Abnormal Patterns (Gait)  gait speed decreased  -MD      Bilateral Gait Deviations  heel strike decreased;forward flexed posture  -MD      [place] Level (Stairs)  contact guard  -MD      Handrail Location (Stairs)  left side (ascending)  -MD      Number of Steps (Stairs)  8  -MD      Ascending Technique (Stairs)  step-over-step;step-to-step  -MD      Descending Technique (Stairs)  step-over-step  -MD      Recorded by [MD] Mira Chadwick, PT      Row Name 08/16/19 1514             Bathing Assessment/Treatment    Bathing [place] Level  bathing skills;minimum assist (75% patient effort)  -AF      Assistive Device (Bathing)  grab bar/tub rail;hand held shower spray hose;tub bench  -AF      Bathing Position  supported sitting;supported standing  -AF      Recorded by [AF] Ingris Ansari, OTR      Row Name 08/16/19 1514             Upper Body Dressing Assessment/Treatment    Upper Body Dressing Task  upper body dressing skills;minimum assist (75% or more patient effort)  -AF      Upper Body Dressing Position  supported sitting  -AF      Recorded by [AF] Ingris Ansari, OTR      Row Name 08/16/19 1514             Lower Body Dressing Assessment/Treatment    Lower Body Dressing [place] Level  doff;don;pants/bottoms;shoes/slippers;socks;moderate assist (50% patient effort)  -AF      Lower Body Dressing Position  unsupported sitting;supported standing  -AF      Recorded by  [AF] Ingris Ansari OTR      Row Name 08/16/19 1514             Grooming Assessment/Treatment    Grooming [place] Level  grooming skills;wash face, hands;oral care regimen;hair care, combing/brushing;minimum assist (75% patient effort);verbal cues  -AF      Grooming Position  sink side;supported sitting  -AF      Recorded by [AF] Ingris Ansari OTR      Row Name 08/16/19 1514             Toileting Assessment/Treatment    Toileting [place] Level  maximum assist (25% patient effort);moderate assist (50% patient effort)  -AF      Assistive Device Use (Toileting)  grab bar/safety frame;raised toilet seat  -AF      Toileting Position  unsupported sitting;supported standing  -AF      Comment (Toileting)  brief change in PM session after encouragement and redirection  -AF      Recorded by [AF] Ingris Ansari OTR      Row Name 08/16/19 1515             Self-Feeding Assessment/Treatment    [place] Level (Self-Feeding)  liquids to mouth  -AF      Position (Self-Feeding)  sitting up in bed  -AF      Comment (Self-Feeding)  sips of water sitting up in bed in PM   -AF      Recorded by [AF] Ingris Ansari OTR      Row Name 08/16/19 0834             Pain Scale: Numbers Pre/Post-Treatment    Pain Scale: Numbers, Pretreatment  0/10 - no pain  -MD      Recorded by [MD] Mira Chadwick, PT      Row Name 08/16/19 0819             Standing Balance Activity    Activities Performed (Standing, Balance Training)  standing reaching outside base of support  -MD      Support Needed for Balance (Standing, Balance Training)  SBA  -MD      Recorded by [MD] Mira Chadwick, PT      Row Name 08/16/19 1518             Neuromuscular Re-education    Comment (Neuromuscular Re-education)  after multiple attempts to use communication board, unable to use TV remote correctly, increased frustration with tasks. pt did point to phone and wanted to call her  at end of session  -AF      Recorded by [AF] Ingris Ansari OTR       Row Name 08/16/19 1514 08/16/19 0834          Positioning and Restraints    Pre-Treatment Position  sitting in chair/recliner  -AF  sitting in chair/recliner  -MD     Post Treatment Position  bed  -AF  bed  -MD     In Bed  supine;notified nsg;call light within reach;encouraged to call for assist;exit alarm on in PM  -AF  supine;call light within reach;exit alarm on  -MD     In Wheelchair  sitting;with PT;with family/caregiver in AM  -AF  --     Recorded by [AF] Ingris Ansari OTR [MD] Mira Chadwick PT       User Key  (r) = Recorded By, (t) = Taken By, (c) = Cosigned By    Initials Name Effective Dates    AF Ingris Ansari OTR 04/03/18 -     Mira Vaca PT 04/03/18 -           Wound 07/17/19 1015 Left neck incision (Active)   Dressing Appearance open to air 8/15/2019  8:00 PM   Closure Liquid skin adhesive 8/15/2019  8:00 PM   Base dry;clean 8/15/2019  8:00 PM   Drainage Amount none 8/16/2019  7:00 AM   Dressing Care, Wound open to air 8/16/2019  7:00 AM         OT Recommendation and Plan    Anticipated Equipment Needs At Discharge (OT Eval): bathing equipment  Planned Therapy Interventions (OT Eval): activity tolerance training, BADL retraining, cognitive/visual perception retraining, functional balance retraining, neuromuscular control/coordination retraining, occupation/activity based interventions, patient/caregiver education/training, ROM/therapeutic exercise, strengthening exercise, transfer/mobility retraining                Occupational Therapy Education     Title: PT OT SLP Therapies (Not Started)     Topic: Occupational Therapy (Done)     Point: ADL training (Done)     Description: Instruct learner(s) on proper safety adaptation and remediation techniques during self care or transfers.   Instruct in proper use of assistive devices.    Learning Progress Summary           Patient Acceptance, E, JEFFY,NR by SEYMOUR at 8/15/2019  3:22 PM    Comment:  Pt and family participated in meeting with rehab team,  recommend 24 hour supervision and would beneift from being in her own environment. discussed her safety awareness and the need for hands on assistance with some ADL tasks seocndary R UE and cognition    Nonacceptance, E, NR by AF at 8/13/2019  3:48 PM    Comment:  benefits and purpose of therapy   Family Acceptance, E, VU,NR by AF at 8/15/2019  3:22 PM    Comment:  Pt and family participated in meeting with rehab team, recommend 24 hour supervision and would beneift from being in her own environment. discussed her safety awareness and the need for hands on assistance with some ADL tasks seocndary R UE and cognition                   Point: Home exercise program (Done)     Description: Instruct learner(s) on appropriate technique for monitoring, assisting and/or progressing therapeutic exercises/activities.    Learning Progress Summary           Patient Acceptance, E, VU,NR by AF at 8/15/2019  3:22 PM    Comment:  Pt and family participated in meeting with rehab team, recommend 24 hour supervision and would beneift from being in her own environment. discussed her safety awareness and the need for hands on assistance with some ADL tasks seocndary R UE and cognition   Family Acceptance, E, VU,NR by AF at 8/15/2019  3:22 PM    Comment:  Pt and family participated in meeting with rehab team, recommend 24 hour supervision and would beneift from being in her own environment. discussed her safety awareness and the need for hands on assistance with some ADL tasks seocndary R UE and cognition                               User Key     Initials Effective Dates Name Provider Type Discipline    AF 04/03/18 -  Ingris Ansari OTR Occupational Therapist OT                       Time Calculation:     Time Calculation- OT     Row Name 08/16/19 1532 08/16/19 1531          Time Calculation- OT    OT Start Time  1430  -AF  0800  -AF     OT Stop Time  1500  -AF  0830  -AF     OT Time Calculation (min)  30 min  -AF  30 min  -AF        User Key  (r) = Recorded By, (t) = Taken By, (c) = Cosigned By    Initials Name Provider Type    AF Ingris Ansari, OTR Occupational Therapist          Therapy Charges for Today     Code Description Service Date Service Provider Modifiers Qty    58913026810 HC OT CARE PLAN EA 15 MIN 8/15/2019 Ingris Ansari, OTR GO 1    65916279285 HC OT NEUROMUSC RE EDUCATION EA 15 MIN 8/15/2019 Ingris Ansari OTR GO 1    96047579786 HC OT THER PROC EA 15 MIN 8/15/2019 Ingris Ansari OTR GO 1    31808588315 HC OT SELF CARE/MGMT/TRAIN EA 15 MIN 8/15/2019 Ingris Ansari OTR GO 2    11558285037 HC OT SELF CARE/MGMT/TRAIN EA 15 MIN 8/16/2019 Ingris Ansari OTR GO 3    86370981159 HC OT NEUROMUSC RE EDUCATION EA 15 MIN 8/16/2019 Ingris Ansari OTR GO 1                   JOHN Mejia  8/16/2019

## 2019-08-16 NOTE — THERAPY TREATMENT NOTE
Inpatient Rehabilitation - Physical Therapy Treatment Note  [facility]     Patient Name: Darby Workman  : 1945  MRN: [id number]    Today's Date: 2019                 Admit Date: 2019      Visit Dx:    No diagnosis found.    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)   • Right-sided headache   • Acute ischemic stroke (CMS/HCC)   • Embolic stroke (CMS/HCC)   • Cerebral infarction due to stenosis of left carotid artery (CMS/HCC)   • Anticoagulated by anticoagulation treatment   • Stroke (cerebrum) (CMS/HCC)       Therapy Treatment    IRF Treatment Summary     Row Name 1934             Evaluation/Treatment Time and Intent    Subjective Information  no complaints  -MD      Existing Precautions/Restrictions  fall  -MD      Document Type  therapy note (daily note)  -MD      Mode of Treatment  physical therapy  -MD      Patient/Family Observations  Pt sitting in WC showing no signs of acute distress.  Pt more aggitated this pm and very difficult to redirect  (Significant)   -MD      Recorded by [MD] Mira Chadwick, PT      Row Name 1934             Cognition/Psychosocial- PT/OT    Orientation Status (Cognition)  unable/difficult to assess  -MD      Follows Commands (Cognition)  delayed response/completion;increased processing time needed;repetition of directions required;verbal cues/prompting required;25-49% accuracy  -MD      Personal Safety Interventions  fall prevention program maintained;gait belt  -MD      Recorded by [MD] Mira Chadwick, PT      Row Name 1934             Transfer Assessment/Treatment    Comment (Transfers)  transfer from lower level of chair this am w SBA.  -MD      Recorded by [MD] Mira Chadwick, PT      Row Name 1934             Sit-Stand Transfer    Sit-Stand [place]  (Transfers)  stand by assist  -MD      Recorded by [MD] Mira Chadwick PT      Row Name 08/16/19 0834             Stand-Sit Transfer    Stand-Sit [place] (Transfers)  stand by assist  -MD      Recorded by [MD] Mira Chadwick PT      Row Name 08/16/19 0834             Gait/Stairs Assessment/Training    [place] Level (Gait)  stand by assist  -MD      Distance in Feet (Gait)  200`x4  -MD      Pattern (Gait)  step-through  -MD      Deviations/Abnormal Patterns (Gait)  gait speed decreased  -MD      Bilateral Gait Deviations  heel strike decreased;forward flexed posture  -MD      [place] Level (Stairs)  contact guard  -MD      Handrail Location (Stairs)  left side (ascending)  -MD      Number of Steps (Stairs)  8  -MD      Ascending Technique (Stairs)  step-over-step;step-to-step  -MD      Descending Technique (Stairs)  step-over-step  -MD      Recorded by [MD] Mira Chadwick PT      Row Name 08/16/19 0834             Pain Scale: Numbers Pre/Post-Treatment    Pain Scale: Numbers, Pretreatment  0/10 - no pain  -MD      Recorded by [MD] Mira Chadwick PT      Row Name 08/16/19 0834             Standing Balance Activity    Activities Performed (Standing, Balance Training)  standing reaching outside base of support  -MD      Support Needed for Balance (Standing, Balance Training)  SBA  -MD      Recorded by [Mira Lehman PT      Row Name 08/16/19 0834             Positioning and Restraints    Pre-Treatment Position  sitting in chair/recliner  -MD      Post Treatment Position  bed  -MD      In Bed  supine;call light within reach;exit alarm on  -MD      Recorded by [MD] Mira Chadwick PT        User Key  (r) = Recorded By, (t) = Taken By, (c) = Cosigned By    Initials Name Effective Dates    Mira Vaca, PT 04/03/18 -         Wound 07/17/19 1015 Left neck incision (Active)   Dressing Appearance open to air 8/15/2019  8:00 PM   Closure Liquid skin adhesive 8/15/2019  8:00 PM   Base dry;clean 8/15/2019  8:00 PM   Drainage  Amount none 8/16/2019  7:00 AM   Dressing Care, Wound open to air 8/16/2019  7:00 AM     Physical Therapy Education     Title: PT OT SLP Therapies (Not Started)     Topic: Physical Therapy (In Progress)     Point: Mobility training (Done)     Learning Progress Summary           Patient Acceptance, E,TB, VU,NR by KELBY at 8/14/2019  3:15 PM    Nonacceptance, E,TB,D, NR by ENID at 8/5/2019 12:00 PM    Acceptance, E,D, NR by NESTOR at 8/3/2019  1:35 PM    Acceptance, D, DU,NR by NESTOR at 8/3/2019  8:56 AM                   Point: Home exercise program (In Progress)     Learning Progress Summary           Patient Nonacceptance, E,TB,D, NR by ENID at 8/5/2019 12:00 PM                   Point: Precautions (In Progress)     Learning Progress Summary           Patient Acceptance, E, NR by MD at 8/16/2019  8:36 AM    Acceptance, E, NR by MD at 8/15/2019  8:59 AM    Acceptance, E, NR by MD at 8/13/2019 11:14 AM    Acceptance, E, NR by MD at 8/12/2019 10:45 AM    Acceptance, E, NR by MD at 8/10/2019 11:15 AM    Acceptance, E, NR by MD at 8/9/2019 11:12 AM    Acceptance, E, NR by MD at 8/8/2019 11:48 AM    Acceptance, E, NR by MD at 8/6/2019 10:41 AM    Acceptance, E, NR by MD at 8/2/2019 10:44 AM    Acceptance, E,D, NR by MD at 8/1/2019 12:16 PM                               User Key     Initials Effective Dates Name Provider Type Discipline     06/08/18 -  Poppy Rosa, PT Physical Therapist PT    JREAL 04/03/18 -  Nicole Austin, PT Physical Therapist PT    MD 04/03/18 -  Mira Chadwick, PT Physical Therapist PT    ENID 04/03/18 -  Marycruz Schmitt, PT Physical Therapist PT                  PT Recommendation and Plan  Anticipated Equipment Needs at Discharge (PT Eval): front wheeled walker  Frequency of Treatment (PT Eval): 5 times per week, 60 minutes per session                     Time Calculation:     PT Charges     Row Name 08/16/19 1445 08/16/19 7616          Time Calculation    Start Time  1400  -MD  0830  -MD     Stop Time  7880   -MD  0900  -MD     Time Calculation (min)  30 min  -MD  30 min  -MD     PT Received On  --  08/16/19  -MD     PT - Next Appointment  --  08/17/19  -MD       User Key  (r) = Recorded By, (t) = Taken By, (c) = Cosigned By    Initials Name Provider Type    Mira Vaca, PT Physical Therapist          Therapy Charges for Today     Code Description Service Date Service Provider Modifiers Qty    44421453773  PT THER PROC EA 15 MIN 8/15/2019 Mira Chadwick, PT GP 4    [id number]  PT THER PROC EA 15 MIN 8/16/2019 Mira Chadwick, PT GP 4                   Mira Chadwick, PT  8/16/2019

## 2019-08-16 NOTE — PROGRESS NOTES
Inpatient Rehabilitation Functional Measures Assessment    Functional Measures  KATI Eating:  [facility] Grooming: [facility] Bathing:  [facility] Upper Body Dressing:  [facility] Lower Body Dressing:  [facility] Toileting:  [facility] Bladder Management  Level of Assistance:  [place]  Frequency/Number of Accidents this Shift:  [facility] Bowel Management  Level of Assistance: [place]  Frequency/Number of Accidents this Shift: [facility] Bed/Chair/Wheelchair Transfer:  [facility] Toilet Transfer:  [facility] Tub/Shower Transfer:  [place]    Previously Documented Mode of Locomotion at Discharge: Field  KATI Expected Mode of Locomotion at Discharge: [facility] Walk/Wheelchair:  [facility] Stairs:  [facility] Comprehension:  Auditory comprehension is the usual mode. Patient does not  comprehend complex/abstract information in their primary language without  assistance from a helper. Comprehension Score = 2, Maximal Prompting. Patient  comprehends basic daily needs 25-49% of the time. Patient understands simple  information via single words or gestures. Requires maximal/a lot of prompting  (most of the time). No assistive devices were required.  KATI Expression:  Vocal expression is the usual mode. Patient does not express  complex/abstract information in their primary language without a helper.  Expression Score = 1, Total Assistance. Patient expresses basic daily needs less  than 25% of the time, or does not express basic needs appropriately or  consistently despite prompting. No assistive devices were required.  KATI Social Interaction:  Social Interaction Score = 2, Maximal Direction.  Patient interacts appropriately 25-49% of the time. Patient requires maximal/a  lot of direction (most of the time) for the following behavior(s):  KATI Problem Solving:  Patient does not make appropriate decisions in order to  solve complex problems without assistance from a helper. Problem Solving Score =  1, Total  Assistance. Patient makes appropriate decisions in order to solve  routine problems less than 25% of the time. Patient requires total direction for  the following behavior(s):  KATI Memory:  Activity was not observed.    Therapy Mode Minutes  Occupational Therapy: [place]  Physical Therapy: [place]  Speech Language Pathology:  [place]    Signed by: Eryn Yan RN

## 2019-08-17 LAB
BACTERIA UR QL AUTO: ABNORMAL /HPF
BILIRUB UR QL STRIP: NEGATIVE
CLARITY UR: ABNORMAL
COLOR UR: YELLOW
GLUCOSE BLDC GLUCOMTR-MCNC: 103 MG/DL (ref 70–130)
GLUCOSE BLDC GLUCOMTR-MCNC: 112 MG/DL (ref 70–130)
GLUCOSE BLDC GLUCOMTR-MCNC: 127 MG/DL (ref 70–130)
GLUCOSE BLDC GLUCOMTR-MCNC: 189 MG/DL (ref 70–130)
GLUCOSE BLDC GLUCOMTR-MCNC: 84 MG/DL (ref 70–130)
GLUCOSE UR STRIP-MCNC: NEGATIVE MG/DL
HGB UR QL STRIP.AUTO: NEGATIVE
HYALINE CASTS UR QL AUTO: ABNORMAL /LPF
KETONES UR QL STRIP: NEGATIVE
LEUKOCYTE ESTERASE UR QL STRIP.AUTO: ABNORMAL
NITRITE UR QL STRIP: POSITIVE
PH UR STRIP.AUTO: 7.5 [PH] (ref 5–8)
PROT UR QL STRIP: NEGATIVE
RBC # UR: ABNORMAL /HPF
REF LAB TEST METHOD: ABNORMAL
SP GR UR STRIP: 1.01 (ref 1–1.03)
SQUAMOUS #/AREA URNS HPF: ABNORMAL /HPF
UROBILINOGEN UR QL STRIP: ABNORMAL
WBC UR QL AUTO: ABNORMAL /HPF

## 2019-08-17 PROCEDURE — 81001 URINALYSIS AUTO W/SCOPE: CPT | Performed by: PHYSICAL MEDICINE & REHABILITATION

## 2019-08-17 PROCEDURE — 97110 THERAPEUTIC EXERCISES: CPT | Performed by: PHYSICAL THERAPIST

## 2019-08-17 PROCEDURE — 87186 SC STD MICRODIL/AGAR DIL: CPT | Performed by: PHYSICAL MEDICINE & REHABILITATION

## 2019-08-17 PROCEDURE — 87077 CULTURE AEROBIC IDENTIFY: CPT | Performed by: PHYSICAL MEDICINE & REHABILITATION

## 2019-08-17 PROCEDURE — 87086 URINE CULTURE/COLONY COUNT: CPT | Performed by: PHYSICAL MEDICINE & REHABILITATION

## 2019-08-17 PROCEDURE — 82962 GLUCOSE BLOOD TEST: CPT

## 2019-08-17 RX ORDER — CEFDINIR 300 MG/1
300 CAPSULE ORAL EVERY 12 HOURS SCHEDULED
Status: DISCONTINUED | OUTPATIENT
Start: 2019-08-17 | End: 2019-08-20

## 2019-08-17 RX ADMIN — NYSTATIN 500000 UNITS: 100000 SUSPENSION ORAL at 17:40

## 2019-08-17 RX ADMIN — HYDRALAZINE HYDROCHLORIDE 50 MG: 50 TABLET, FILM COATED ORAL at 14:57

## 2019-08-17 RX ADMIN — LANSOPRAZOLE 30 MG: KIT at 17:40

## 2019-08-17 RX ADMIN — PAROXETINE HYDROCHLORIDE 10 MG: 10 TABLET, FILM COATED ORAL at 08:35

## 2019-08-17 RX ADMIN — METFORMIN HYDROCHLORIDE 1000 MG: 1000 TABLET ORAL at 17:40

## 2019-08-17 RX ADMIN — GLIPIZIDE 5 MG: 5 TABLET ORAL at 06:31

## 2019-08-17 RX ADMIN — APIXABAN 5 MG: 5 TABLET, FILM COATED ORAL at 20:39

## 2019-08-17 RX ADMIN — MEMANTINE HYDROCHLORIDE 5 MG: 5 TABLET, FILM COATED ORAL at 08:35

## 2019-08-17 RX ADMIN — NEBIVOLOL HYDROCHLORIDE 20 MG: 10 TABLET ORAL at 20:39

## 2019-08-17 RX ADMIN — ASPIRIN 325 MG: 325 TABLET ORAL at 08:36

## 2019-08-17 RX ADMIN — BRIMONIDINE TARTRATE 1 DROP: 2 SOLUTION OPHTHALMIC at 20:39

## 2019-08-17 RX ADMIN — HYDRALAZINE HYDROCHLORIDE 50 MG: 50 TABLET, FILM COATED ORAL at 20:40

## 2019-08-17 RX ADMIN — NYSTATIN 500000 UNITS: 100000 SUSPENSION ORAL at 08:35

## 2019-08-17 RX ADMIN — ATORVASTATIN CALCIUM 80 MG: 80 TABLET, FILM COATED ORAL at 20:39

## 2019-08-17 RX ADMIN — DORZOLAMIDE HYDROCHLORIDE 1 DROP: 20 SOLUTION/ DROPS OPHTHALMIC at 20:39

## 2019-08-17 RX ADMIN — CEFDINIR 300 MG: 300 CAPSULE ORAL at 20:39

## 2019-08-17 RX ADMIN — NYSTATIN 500000 UNITS: 100000 SUSPENSION ORAL at 12:46

## 2019-08-17 RX ADMIN — LOSARTAN POTASSIUM: 50 TABLET, FILM COATED ORAL at 08:38

## 2019-08-17 RX ADMIN — BRIMONIDINE TARTRATE 1 DROP: 2 SOLUTION OPHTHALMIC at 08:35

## 2019-08-17 RX ADMIN — APIXABAN 5 MG: 5 TABLET, FILM COATED ORAL at 08:36

## 2019-08-17 RX ADMIN — LANSOPRAZOLE 30 MG: KIT at 06:31

## 2019-08-17 RX ADMIN — HYDRALAZINE HYDROCHLORIDE 50 MG: 50 TABLET, FILM COATED ORAL at 06:31

## 2019-08-17 RX ADMIN — POTASSIUM CHLORIDE 20 MEQ: 1.5 POWDER, FOR SOLUTION ORAL at 08:36

## 2019-08-17 RX ADMIN — METFORMIN HYDROCHLORIDE 1000 MG: 1000 TABLET ORAL at 08:36

## 2019-08-17 RX ADMIN — NEBIVOLOL HYDROCHLORIDE 20 MG: 10 TABLET ORAL at 08:36

## 2019-08-17 RX ADMIN — AMLODIPINE BESYLATE 5 MG: 5 TABLET ORAL at 20:40

## 2019-08-17 RX ADMIN — GLIPIZIDE 5 MG: 5 TABLET ORAL at 17:40

## 2019-08-17 RX ADMIN — DORZOLAMIDE HYDROCHLORIDE 1 DROP: 20 SOLUTION/ DROPS OPHTHALMIC at 08:35

## 2019-08-17 RX ADMIN — NYSTATIN 500000 UNITS: 100000 SUSPENSION ORAL at 20:39

## 2019-08-17 NOTE — PROGRESS NOTES
Inpatient Rehabilitation Functional Measures Assessment    Functional Measures  KATI Eating:  [place]  [facility] Grooming: [place]  [facility] Bathing:  [place]  [facility] Upper Body Dressing:  [place]  [facility] Lower Body Dressing:  [place]  [facility] Toileting:  [facility] Bladder Management  Level of Assistance:  [place]  Frequency/Number of Accidents this Shift:  [facility] Bowel Management  Level of Assistance: [place]  Frequency/Number of Accidents this Shift: [place]    [facility] Bed/Chair/Wheelchair Transfer:  Activity was not observed.  KATI Toilet Transfer:  [facility] Tub/Shower Transfer:  [place]    Previously Documented Mode of Locomotion at Discharge: Field  KATI Expected Mode of Locomotion at Discharge: [facility] Walk/Wheelchair:  WHEELCHAIR OBSERVATION   Activity was not observed.    WALK OBSERVATION   Activity was not observed.  KATI Stairs:  Activity was not observed.    KATI Comprehension:  [place]  [facility] Expression:  [facility] Social Interaction:  [facility] Problem Solving:  [facility] Memory:  [place]    Therapy Mode Minutes  Occupational Therapy: [place]  Physical Therapy: Individual: 25 minutes.  Speech Language Pathology:  [place]    Signed by: Steph Salinas PT

## 2019-08-17 NOTE — NURSING NOTE
Mrs Workman has been flat and quiet this afternoon. She slept several hours. She will not get upto eat., she will not open mouth when try to feed. She opens eyes. I was able to get her to take meds crushed in as and drink some OJ. She was incontinent of BM and assisted sitter in  Turning and getting cleaned up.

## 2019-08-17 NOTE — PLAN OF CARE
Problem: Skin Injury Risk (Adult)  Goal: Skin Health and Integrity  Outcome: Ongoing (interventions implemented as appropriate)   08/17/19 0517   Skin Injury Risk (Adult)   Skin Health and Integrity making progress toward outcome       Problem: Fall Risk (Adult)  Goal: Absence of Fall  Outcome: Ongoing (interventions implemented as appropriate)   08/17/19 0517   Fall Risk (Adult)   Absence of Fall making progress toward outcome       Problem: Patient Care Overview  Goal: Plan of Care Review  Outcome: Ongoing (interventions implemented as appropriate)   08/17/19 0517   Patient Care Overview   IRF Plan of Care Review progress ongoing, continue   Progress, Functional Goals demonstrating adequate progress   Coping/Psychosocial   Plan of Care Reviewed With patient   OTHER   Outcome Summary Pt. is very confused, impulsive, irritable, agitated, combative, panic and uncooperative with staff. Called Dr. Patel several times. UA ordered, but no collect until now. Dr. Howard ordered sitter at bedside continuous at 2014. Sitter attended at 2300. Takes Meds crushed in pudding/AS. Continent of B&B. Red armband on. AC&HS. BG is 159. Meds no given per Pt. refused. Pt. can dropped into asleep after 0400. No further issues to note at this time. Will continue to monitor.     Goal: Coping Plan  Outcome: Ongoing (interventions implemented as appropriate)   08/17/19 0517   Coping Plan   Demonstration of Effective Coping Strategies demonstrating adequate progress       Problem: Stroke (IRF) (Adult)  Goal: Promote Optimal Functional [place]  Outcome: Ongoing (interventions implemented as appropriate)   08/17/19 0517   Stroke (IRF) (Adult)   Promote Optimal Functional [place] demonstrating adequate progress

## 2019-08-17 NOTE — PROGRESS NOTES
Inpatient Rehabilitation Functional Measures Assessment    Functional Measures  KATI Eating:  [facility] Grooming: [facility] Bathing:  [facility] Upper Body Dressing:  [facility] Lower Body Dressing:  [facility] Toileting:  [facility] Bladder Management  Level of Assistance:  [place]  Frequency/Number of Accidents this Shift:  [facility] Bowel Management  Level of Assistance: [place]  Frequency/Number of Accidents this Shift: [facility] Bed/Chair/Wheelchair Transfer:  [facility] Toilet Transfer:  [facility] Tub/Shower Transfer:  [place]    Previously Documented Mode of Locomotion at Discharge: Field  KATI Expected Mode of Locomotion at Discharge: [facility] Walk/Wheelchair:  [facility] Stairs:  [facility] Comprehension:  Both ( auditory and visual) modes of comprehension are used  equally. Patient does not comprehend complex/abstract information in their  primary language without assistance from a helper. Comprehension Score = 2,  Maximal Prompting. Patient comprehends basic daily needs 25-49% of the time.  Patient understands simple information via single words or gestures. Requires  maximal/a lot of prompting (most of the time). No assistive devices were  required.  KATI Expression:  Both ( vocal and non-vocal) modes of expression are used  equally. Patient does not express complex/abstract information in their primary  language without a helper. Expression Score = 1, Total Assistance. Patient  expresses basic daily needs less than 25% of the time, or does not express basic  needs appropriately or consistently despite prompting. No assistive devices were  required.  KATI Social Interaction:  Social Interaction Score = 6, Modified Independent.  Patient is modified independent for social interaction, requiring: Requires a  structured or modified environment. Medications.  KATI Problem Solving:  Activity was not observed.  KATI Memory:  Activity was not observed.    Therapy Mode Minutes  Occupational  Therapy: Branch  Physical Therapy: Branch  Speech Language Pathology:  Branch    Signed by: Hina Hudson RN

## 2019-08-17 NOTE — PROGRESS NOTES
Inpatient Rehabilitation Plan of Care Note    Plan of Care  Care Plan Reviewed - No updates at this time.    Psychosocial    Performed Intervention(s)  Assist patient to express needs and concerns  calm enviroment      Safety    Performed Intervention(s)  Bed alarm, wc alarm  Safety rounds  Items within reach      Sphincter Control    Performed Intervention(s)  Monitor intake and output  Encourage fluid intake  Elimination schedule    Signed by: Dimitry Baldwin RN

## 2019-08-17 NOTE — NURSING NOTE
Pt. is very confused and uncooperative with staff. Nurse aid has to stay with her. Called Dr. Patel for requiring sitter at bedside at 1940. Got new order for UA, but no order sitter at bedside. Called Dr. Howard at 2014 and got order for sitter at bedside. And then notified  for need sitter. BP is 183/82 at 1946. Pt. Refused to check BP by Manual at 1950. Pt. is very confused and refused to take Meds. Called spouse at 2045, but nobody answer, left voice message to him. RN checked BP by manual, but Pt. refused to check BP again at 2058. RN educated to patient and check BP is 181/73 by automatic at 2230. BP is 145/70 by Manual at 2240. Still refused to take Meds although RN educated. Called Dr. Patel for pt. refused to take Meds at 2250 and she asked to get UA by catheter. RN tried to do, but patient refused to catheter and go to BR. Pt. is very impulsive, irritable, combative, panic and uncooperative. Called Dr. Patel for refusing catheter at 2321 and no new order given. Sitter attended at 2300. Pt. Dropped into asleep after 0400. Will continue to monitor.

## 2019-08-17 NOTE — PROGRESS NOTES
Pt was agitated last night and would not take meds or let them get a U/A  This am she took her meds and they were able to get a U/A. Seems more agitated and refusing than usual.  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  If add Namenda, will probably discontinue amantadine as she continues alert.  August 11-she has some increased irritability at times.  This may be related to the underlying stroke deficits itself.  She does not appear to have any dysuria, no odor to her urine.  Staff reports that for the most part she is eating okay.  Will check a follow-up CT of the head to assess for any interval visual changes.  She is on aspirin and Eliquis for stroke prophylaxis.  She had noticeably improved alertness when amantadine was started.  She is readily alert now.  This may be related to natural recovery in terms of her level of arousal at this point.  Current plan is to discontinue the amantadine to see if that helps with her irritability and start  on Namenda for aphasia.  August 12 -  Patient with increased restlessness at times.   Continues aphasia. Not able to identify any complaint.  Appears anxious today. No change on CT head yesterday. Started on Namenda for aphasia on 8/12/19.   August 13- will continue to monitor on recent med adjustments   August 15-She continues with expressive language deficits.    Again appears anxious related to her aphasia and difficulty expressing herself.    She will be able to get  occasional single word for the examiner.  She does follow instructions.  The patient was reviewed with .  Discussed adding on an antidepressant with anxiolytic properties -Paxil-as it appears frustration from her aphasia with associated anxiety with the frustration affects her performance.  We will plan on starting Paxil 10 mg daily tomorrow and monitor response.  Reviewed with the patient's  that would not add a short acting anxiolytic (such as a benzodiazepine or Seroquel) as it may affect her cognitive performance.    August 16-started Paxil 10 mg daily     Aphasia -  August 12 - trial of Namenda  August 16-continue to observe on Namenda 5 mg daily for her aphasia and may possibly titrate up next week     Dysphagia-Cortrak feeding tube discontinued on July 31 and started on puréed/honey thick liquid diet with strategies  August 7-advance to fork mash nectar thick liquid diet, consistent carbohydrate.  August 14 - repeat VFSS to assess for advancing to thin liquids  August 15-Video fluoroscopic swallow study today-mechanical soft, no mixed consistency, nectar thick liquid, water protocol between meals, no straws.     History of multiple bilateral strokes and left central retinal artery occlusion     History of left greater than right internal carotid artery stenosis-status post left internal carotid artery stent July 17, 2019     History of hypertension -  Hyzaar 100-25. August 4 - Bystolic increased to 20 mg daily to 20 mg bid.  August 8 - BP still runs high. On discharge in May 2019 was on Hyzaar 100-25 mg daily, Bystolic 20 mg daily, amlodipine 10 mg daily, Hydralazine 50 mg q 8 hours.  Will add Hydralazine initially 10 mg po q 8 hours and titrate up as needed.   August 9-systolic blood pressure elevated last night and early this morning but better this afternoon at 122-130.  Continue to follow recent addition of hydralazine and titrate up as needed  August 10-systolic blood pressure 175 this  afternoon, range otherwise 122-142.  Will titrate up on hydralazine to 20 mg every 8 hours.  August 11-hypertension overall appears improved.  Will titrate up further to 25 mg every 8 hours.  August 12 - add amlodipine 5 mg daily.   August 14 - titrate up on hydralazine to 50 mg q 8 hours  August 15-blood pressure improved this afternoon with systolic blood pressure in the 120s-follow on current regimen  August 16-blood pressure range 110////59-will continue to monitor on present regimen     History of diabetes mellitus -Lantus/glipizide (home medication metformin 1000 mg twice daily and glipizide 10 mg twice daily)  August 1-blood sugars were low yesterday afternoon after tube feeds discontinued.  Held glipizide last night and this morning and Lantus last night.  Blood sugar elevated at noontime today.  Will receive resume glipizide at a lower dose of 5 mg twice a day with meals.  Continue sliding scale insulin coverage.  She also is on metformin at home.  August 5-add back metformin initially at 500 mg twice a day and continue glipizide 5 mg twice a day, both one half of previous home dose, titrate up as needed.  August 7-titrate up metformin to 850 mg twice a day.  Add consistent carbohydrate restriction to diet.  August 9-increase metformin to 1000 g twice a day.  August 10-blood glucose 340 last evening but 150-114-178 so far today  August 11-continue to follow blood sugar pattern and may increase glipizide further as current dose glipizide 5 mg twice a day along with metformin 1000 mg twice a day  August 14 - blood glucose  past 24 hours - monitor pattern.  August 15-blood glucose [phone]-continue to follow pattern.  Will change sliding scale insulin coverage to Humalog at a lower dose.  She was started on the regular insulin sliding scale when she was on tube feeds.  August 16 recent blood glucose -188-138     Stroke  prophylaxis-aspirin/Eliquis/atorvastatin     Anemia-August 1-hemoglobin 7.4.  Most recent check on July 23 HGB 9.3.  Will recheck hemoglobin this afternoon.  Hemoccult stool.  Iron studies.  Reticulocyte count.  Recheck CBC in the a.m.  Added Protonix.  Patient is on aspirin and Eliquis.  With recent stroke  will look to transfuse packed red blood cell.  August 2-hemoglobin improved 9.0-1 unit packed red blood cells.  Elevated reticulocyte count in response to anemia.  Nursing describes dark stool.  Patient discussed with gastroenterology.  At this point unable to do endoscopy as on Eliquis and aspirin.  Will treat symptomatically for now with increasing proton pump inhibitor and transfusing as needed.  August 5-anemia improved-hemoglobin 9.8  August 16-hemoglobin unchanged 9.8     DVT prophylaxis-SCDs/anticoagulation     Impulsivity-   Nursing to do re-orientation with the patient.  Room close to the nursing station.     Endocrine-vitamin D deficiency-ergocalciferol 50,000 units weekly x8 weeks added. Vitamin B12 level and TSH checked in late May unremarkable       8/17- U/A positive, could explain the new increased agitation.  Will add cefdinir until culture is back  No other changes at this time  Monitor the addition of Paxil

## 2019-08-17 NOTE — PROGRESS NOTES
Inpatient Rehabilitation Functional Measures Assessment    Functional Measures  KATI Eating:  [facility] Grooming: [facility] Bathing:  [facility] Upper Body Dressing:  [facility] Lower Body Dressing:  [facility] Toileting:  [facility] Bladder Management  Level of Assistance:  [place]  Frequency/Number of Accidents this Shift:  [facility] Bowel Management  Level of Assistance: [place]  Frequency/Number of Accidents this Shift: [facility] Bed/Chair/Wheelchair Transfer:  [facility] Toilet Transfer:  [facility] Tub/Shower Transfer:  [place]    Previously Documented Mode of Locomotion at Discharge: Field  KATI Expected Mode of Locomotion at Discharge: [facility] Walk/Wheelchair:  [facility] Stairs:  [facility] Comprehension:  Both ( auditory and visual) modes of comprehension are used  equally. Patient does not comprehend complex/abstract information in their  primary language without assistance from a helper. Comprehension Score = 2,  Maximal Prompting. Patient comprehends basic daily needs 25-49% of the time.  Patient understands simple information via single words or gestures. Requires  maximal/a lot of prompting (most of the time). Patient requires the following  assistive device(s): Glasses. very confused at times .  KATI Expression:  Both ( vocal and non-vocal) modes of expression are used  equally. Patient does not express complex/abstract information in their primary  language without a helper. Expression Score = 2, Maximal Prompting. Patient  expresses basic daily needs 25-49% of the time. Patient uses only single words  or gestures and requires maximal/a lot of prompting (most of the time). Patient  requires the following assistive device(s): very confused, impulsive and  irritable. Expressive global aphasia and dysarthria. Only speak Yes/No. Uses  communication sheet. .  KATI Social Interaction:  Social Interaction Score = 1, Total Assistance. Patient  interacts appropriately less than 25% of  the time.  Patient requires total  assistance (directing all or almost all of the time) for the following  behavior(s): Excessively loud. Non-cooperative. Non-interactive. Severely  withdrawn. Pt. is very confused, impulsive and irritable. .  KATI Problem Solving:  Patient does not make appropriate decisions in order to  solve complex problems without assistance from a helper. Problem Solving Score =  1, Total Assistance. Patient makes appropriate decisions in order to solve  routine problems less than 25% of the time. Patient requires total direction for  the following behavior(s): Decreased awareness of performance. Difficulty  completing tasks. Difficulty with self-correction. Difficulty with  self-monitoring. Exhibits poor planning. Impulsivity. Inability to follow simple  commands. Poor judgment. Pt. is very confused, impulsive and irritable. .  KATI Memory:  Memory Score = 1, Total Assistance. Patient recognizes and  remembers less than 25% of the time. Patient requires total assistance  (prompting all or almost all of the time) for memory for the following: Unaware  of daily routine. Pt. is very confused and irritable.    Therapy Mode Minutes  Occupational Therapy: Branch  Physical Therapy: Branch  Speech Language Pathology:  Branch    Signed by: Dimitry Baldwin RN

## 2019-08-17 NOTE — PROGRESS NOTES
Inpatient Rehabilitation Plan of Care Note    Plan of Care  Care Plan Reviewed - No updates at this time.    Psychosocial    Performed Intervention(s)  Assist patient to express needs and concerns  calm enviroment      Safety    Performed Intervention(s)  Bed alarm, wc alarm  Safety rounds  Items within reach  24 hr sitter      Body Systems    Performed Intervention(s)  Daily skin inspection      Sphincter Control    Performed Intervention(s)  Monitor intake and output  Encourage fluid intake  Elimination schedule    Signed by: Hina Hudson RN

## 2019-08-17 NOTE — PLAN OF CARE
Problem: Skin Injury Risk (Adult)  Goal: Skin Health and Integrity  Outcome: Ongoing (interventions implemented as appropriate)      Problem: Fall Risk (Adult)  Goal: Absence of Fall  Outcome: Ongoing (interventions implemented as appropriate)      Problem: Patient Care Overview  Goal: Plan of Care Review  Outcome: Ongoing (interventions implemented as appropriate)   08/17/19 0408   Patient Care Overview   IRF Plan of Care Review progress ongoing, continue   Progress, Functional Goals other (see comments)   Coping/Psychosocial   Plan of Care Reviewed With patient   OTHER   Outcome Summary Mrs. Workman was restless, agitated, irritable, refusing therapy and to eat lunch. After lunch pt was more calm and slept some. UA was sent and positive for UTI, will begin po AB.       Problem: Stroke (IRF) (Adult)  Goal: Promote Optimal Functional [place]  Outcome: Ongoing (interventions implemented as appropriate)

## 2019-08-17 NOTE — THERAPY TREATMENT NOTE
"Inpatient Rehabilitation - Physical Therapy Treatment Note  [facility]     Patient Name: Darby Workman  : 1945  MRN: [id number]    Today's Date: 2019                 Admit Date: 2019      Visit Dx:    No diagnosis found.    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)   • Right-sided headache   • Acute ischemic stroke (CMS/HCC)   • Embolic stroke (CMS/HCC)   • Cerebral infarction due to stenosis of left carotid artery (CMS/HCC)   • Anticoagulated by anticoagulation treatment   • Stroke (cerebrum) (CMS/HCC)       Therapy Treatment    IRF Treatment Summary     Row Name 19 1100             Evaluation/Treatment Time and Intent    Subjective Information  -- Pt repeatedly says \"no\" to therapy.  -JS      Existing Precautions/Restrictions  fall  -JS      Document Type  therapy note (daily note)  -JS      Mode of Treatment  physical therapy  -JS      Patient/Family Observations  Pt sitting in wheelchair, with  present. Agitated with decreased willingness to participate in therapy.  -JS      Start Time (Evaluation/Treatment)  1100  -JS      Stop Time (Evaluation/Treatment)  1125  -JS      Recorded by [JS] Steph Salinas PT      Row Name 19 1100             Cognition/Psychosocial- PT/OT    Affect/Mental Status (Cognitive)  confused  -JS      Behavioral Issues (Cognitive)  uncooperative  -JS      Follows Commands (Cognition)  does not follow one step commands  -JS      Personal Safety Interventions  fall prevention program maintained;gait belt;muscle strengthening facilitated;nonskid shoes/slippers when out of bed  -JS      Recorded by [JS] Steph Salinas PT      Row Name 19 1100             Bed Mobility Assessment/Treatment    Comment (Bed Mobility)  In w/c  -JS      Recorded by [JS] Steph Salinas PT Row " Name 08/17/19 1100             Sit-Stand Transfer    Sit-Stand [place] (Transfers)  moderate assist (50% patient effort);2 person assist Pt resisting transfer, refused to stand on several attempts  -JS      Assistive Device (Sit-Stand Transfers)  walker, front-wheeled  -JS      Recorded by [JS] Steph Salinas, PT      Row Name 08/17/19 1100             Stand-Sit Transfer    Stand-Sit [place] (Transfers)  minimum assist (75% patient effort)  -JS      Assistive Device (Stand-Sit Transfers)  walker, front-wheeled  -JS      Recorded by [JS] Steph Salinas, PT      Row Name 08/17/19 1100             Gait/Stairs Assessment/Training    Comment (Gait/Stairs)  Pt adamantly refused ambulation today  -JS      Recorded by [JS] Steph Salinas, PT      Row Name 08/17/19 1100             Wheelchair Mobility/Management    Comment, Parts Management (Wheelchair)  Pt attempts to unlock w/c brake when brake set by therapist. Difficulty placing feet leg rests due to pt refusal. Refuses to be propelled forward- able to move w/c backward with pt seated in chair for a short distance before pt puts feet down to prohibit further movement.   -JS      Recorded by [JS] Steph Salinas, PT      Row Name 08/17/19 1100             Safety Issues, Functional Mobility    Safety Issues Affecting Function (Mobility)  ability to follow commands;at risk behavior observed;insight into deficits/self awareness;awareness of need for assistance;impulsivity;judgment  -JS      Recorded by [JS] Steph Salinas, PT      Row Name 08/17/19 1100             Dynamic Sitting Balance    Level of [place], Reaches Outside Midline (Sitting, Dynamic Balance)  supervision  -JS      Sitting Position, Reaches Outside Midline (Sitting, Dynamic Balance)  sitting in wheelchair  -JS      Comment, Reaches Outside Midline (Sitting, Dynamic Balance)  Reaching for paper out of base of support while sitting in wheelchair.   -JS      Recorded by [JS] Steph Salinas, PT      Row Name  08/17/19 1100             Lower Extremity Seated Therapeutic Exercise    Comment, Seated Lower Extremity (Therapeutic Exercise)  Attempted seated ex- pt refused all ex during session  -JS      Recorded by [JS] Steph Salinas PT      Row Name 08/17/19 1100             Positioning and Restraints    Pre-Treatment Position  sitting in chair/recliner  -JS      Post Treatment Position  wheelchair  -JS      In Wheelchair  sitting;with family/caregiver;notified nsg;patient within staff view seated in dining room with sitter,  & son  -JS      Recorded by [JS] Steph Salinas PT      Row Name 08/17/19 1100             Daily Summary of Progress (PT)    Functional Goal Overall Progress: Physical Therapy  -- Significant agitation today, limiting progress.  -JS      Recorded by [JS] Steph Salinas PT        User Key  (r) = Recorded By, (t) = Taken By, (c) = Cosigned By    Initials Name Effective Dates    Steph Mendieta PT 04/06/17 -         Wound 07/17/19 1015 Left neck incision (Active)   Dressing Appearance open to air 8/17/2019  8:00 AM   Closure Liquid skin adhesive 8/17/2019  8:00 AM   Base dry;clean 8/17/2019  8:00 AM   Drainage Amount none 8/16/2019  8:45 PM   Dressing Care, Wound open to air 8/16/2019  8:45 PM     Physical Therapy Education     Title: PT OT SLP Therapies (Not Started)     Topic: Physical Therapy (In Progress)     Point: Mobility training (In Progress)     Learning Progress Summary           Patient Nonacceptance, E,D, NR by DONI at 8/17/2019 11:44 AM    Acceptance, E,TB, VU,NR by KELBY at 8/14/2019  3:15 PM    Nonacceptance, E,TB,D, NR by ENID at 8/5/2019 12:00 PM    Acceptance, E,D, NR by NESTOR at 8/3/2019  1:35 PM    Acceptance, D, DU,NR by NESTOR at 8/3/2019  8:56 AM                   Point: Home exercise program (In Progress)     Learning Progress Summary           Patient Nonacceptance, E,D, NR by DONI at 8/17/2019 11:44 AM    Nonacceptance, E,TB,D, NR by ENID at 8/5/2019 12:00 PM                   Point: Body  mechanics (In Progress)     Learning Progress Summary           Patient Nonacceptance, E,D, NR by JS at 8/17/2019 11:44 AM                   Point: Precautions (In Progress)     Learning Progress Summary           Patient Nonacceptance, E,D, NR by DONI at 8/17/2019 11:44 AM    Acceptance, E, NR by MD at 8/16/2019  8:36 AM    Acceptance, E, NR by MD at 8/15/2019  8:59 AM    Acceptance, E, NR by MD at 8/13/2019 11:14 AM    Acceptance, E, NR by MD at 8/12/2019 10:45 AM    Acceptance, E, NR by MD at 8/10/2019 11:15 AM    Acceptance, E, NR by MD at 8/9/2019 11:12 AM    Acceptance, E, NR by MD at 8/8/2019 11:48 AM    Acceptance, E, NR by MD at 8/6/2019 10:41 AM    Acceptance, E, NR by MD at 8/2/2019 10:44 AM    Acceptance, E,D, NR by MD at 8/1/2019 12:16 PM                               User Key     Initials Effective Dates Name Provider Type Discipline    KELBY 06/08/18 -  Poppy Rosa, PT Physical Therapist PT     04/06/17 -  Steph Salinas, PT Physical Therapist PT    JK 04/03/18 -  Nicole Austin, PT Physical Therapist PT    MD 04/03/18 -  Mira Chadwick, PT Physical Therapist PT     04/03/18 -  Marycruz Schmitt, PT Physical Therapist PT                  PT Recommendation and Plan        Daily Summary of Progress (PT)  Functional Goal Overall Progress: Physical Therapy: (Significant agitation today, limiting progress.)               Time Calculation:     PT Charges     Row Name 08/17/19 1100             Time Calculation    Start Time  1100  -JS      Stop Time  1125  -JS      Time Calculation (min)  25 min  -JS        User Key  (r) = Recorded By, (t) = Taken By, (c) = Cosigned By    Initials Name Provider Type    Steph Mendieta, PT Physical Therapist          Therapy Charges for Today     Code Description Service Date Service Provider Modifiers Qty    12827693038 HC PT THER PROC EA 15 MIN 8/17/2019 Steph Salinas, PT GP 2    84653541985 HC PT THER PROC EA 15 MIN 8/17/2019 Steph Salinas, PT GP 2    44302956294 HC PT THER SUPP  EA 15 MIN 8/17/2019 Steph Salinas, PT GP 1                   Steph Salinas, PT  8/17/2019

## 2019-08-18 LAB
GLUCOSE BLDC GLUCOMTR-MCNC: 127 MG/DL (ref 70–130)
GLUCOSE BLDC GLUCOMTR-MCNC: 175 MG/DL (ref 70–130)
GLUCOSE BLDC GLUCOMTR-MCNC: 193 MG/DL (ref 70–130)
GLUCOSE BLDC GLUCOMTR-MCNC: 194 MG/DL (ref 70–130)

## 2019-08-18 PROCEDURE — 82962 GLUCOSE BLOOD TEST: CPT

## 2019-08-18 PROCEDURE — 63710000001 INSULIN LISPRO (HUMAN) PER 5 UNITS: Performed by: PHYSICAL MEDICINE & REHABILITATION

## 2019-08-18 RX ADMIN — METFORMIN HYDROCHLORIDE 1000 MG: 1000 TABLET ORAL at 17:27

## 2019-08-18 RX ADMIN — INSULIN LISPRO 2 UNITS: 100 INJECTION, SOLUTION INTRAVENOUS; SUBCUTANEOUS at 21:56

## 2019-08-18 RX ADMIN — NYSTATIN 500000 UNITS: 100000 SUSPENSION ORAL at 21:56

## 2019-08-18 RX ADMIN — DORZOLAMIDE HYDROCHLORIDE 1 DROP: 20 SOLUTION/ DROPS OPHTHALMIC at 08:46

## 2019-08-18 RX ADMIN — HYDRALAZINE HYDROCHLORIDE 50 MG: 50 TABLET, FILM COATED ORAL at 06:11

## 2019-08-18 RX ADMIN — MEMANTINE HYDROCHLORIDE 5 MG: 5 TABLET, FILM COATED ORAL at 08:46

## 2019-08-18 RX ADMIN — PAROXETINE HYDROCHLORIDE 10 MG: 10 TABLET, FILM COATED ORAL at 08:46

## 2019-08-18 RX ADMIN — AMLODIPINE BESYLATE 5 MG: 5 TABLET ORAL at 21:56

## 2019-08-18 RX ADMIN — HYDRALAZINE HYDROCHLORIDE 50 MG: 50 TABLET, FILM COATED ORAL at 14:51

## 2019-08-18 RX ADMIN — CEFDINIR 300 MG: 300 CAPSULE ORAL at 08:46

## 2019-08-18 RX ADMIN — INSULIN LISPRO 2 UNITS: 100 INJECTION, SOLUTION INTRAVENOUS; SUBCUTANEOUS at 11:50

## 2019-08-18 RX ADMIN — LOSARTAN POTASSIUM: 50 TABLET, FILM COATED ORAL at 08:47

## 2019-08-18 RX ADMIN — LANSOPRAZOLE 30 MG: KIT at 06:13

## 2019-08-18 RX ADMIN — DORZOLAMIDE HYDROCHLORIDE 1 DROP: 20 SOLUTION/ DROPS OPHTHALMIC at 21:55

## 2019-08-18 RX ADMIN — NEBIVOLOL HYDROCHLORIDE 20 MG: 10 TABLET ORAL at 08:46

## 2019-08-18 RX ADMIN — APIXABAN 5 MG: 5 TABLET, FILM COATED ORAL at 08:47

## 2019-08-18 RX ADMIN — NEBIVOLOL HYDROCHLORIDE 20 MG: 10 TABLET ORAL at 21:56

## 2019-08-18 RX ADMIN — LANSOPRAZOLE 30 MG: KIT at 17:27

## 2019-08-18 RX ADMIN — GLIPIZIDE 5 MG: 5 TABLET ORAL at 06:11

## 2019-08-18 RX ADMIN — APIXABAN 5 MG: 5 TABLET, FILM COATED ORAL at 21:56

## 2019-08-18 RX ADMIN — NYSTATIN 500000 UNITS: 100000 SUSPENSION ORAL at 11:50

## 2019-08-18 RX ADMIN — ASPIRIN 325 MG: 325 TABLET ORAL at 08:47

## 2019-08-18 RX ADMIN — NYSTATIN 500000 UNITS: 100000 SUSPENSION ORAL at 17:27

## 2019-08-18 RX ADMIN — GLIPIZIDE 5 MG: 5 TABLET ORAL at 17:27

## 2019-08-18 RX ADMIN — NYSTATIN 500000 UNITS: 100000 SUSPENSION ORAL at 08:49

## 2019-08-18 RX ADMIN — METFORMIN HYDROCHLORIDE 1000 MG: 1000 TABLET ORAL at 08:48

## 2019-08-18 RX ADMIN — INSULIN LISPRO 2 UNITS: 100 INJECTION, SOLUTION INTRAVENOUS; SUBCUTANEOUS at 17:27

## 2019-08-18 RX ADMIN — BRIMONIDINE TARTRATE 1 DROP: 2 SOLUTION OPHTHALMIC at 21:55

## 2019-08-18 RX ADMIN — HYDRALAZINE HYDROCHLORIDE 50 MG: 50 TABLET, FILM COATED ORAL at 21:56

## 2019-08-18 RX ADMIN — ATORVASTATIN CALCIUM 80 MG: 80 TABLET, FILM COATED ORAL at 21:56

## 2019-08-18 RX ADMIN — POTASSIUM CHLORIDE 20 MEQ: 1.5 POWDER, FOR SOLUTION ORAL at 08:46

## 2019-08-18 RX ADMIN — BRIMONIDINE TARTRATE 1 DROP: 2 SOLUTION OPHTHALMIC at 08:46

## 2019-08-18 RX ADMIN — CEFDINIR 300 MG: 300 CAPSULE ORAL at 21:56

## 2019-08-18 NOTE — PLAN OF CARE
Problem: Skin Injury Risk (Adult)  Goal: Skin Health and Integrity  Outcome: Ongoing (interventions implemented as appropriate)      Problem: Fall Risk (Adult)  Goal: Absence of Fall  Outcome: Ongoing (interventions implemented as appropriate)      Problem: Patient Care Overview  Goal: Plan of Care Review  Outcome: Ongoing (interventions implemented as appropriate)   08/18/19 1631   Patient Care Overview   IRF Plan of Care Review progress ongoing, continue   Progress, Functional Goals demonstrating adequate progress   Coping/Psychosocial   Plan of Care Reviewed With patient   OTHER   Outcome Summary Mrs. Workman has been calm and alert today. Still unable to express self which causes frustration at times. No agitation today. Sitter at BS. Meds crushed with AS. Taking NTL.       Problem: Stroke (IRF) (Adult)  Goal: Promote Optimal Functional [place]  Outcome: Ongoing (interventions implemented as appropriate)

## 2019-08-18 NOTE — PROGRESS NOTES
Inpatient Rehabilitation Functional Measures Assessment    Functional Measures  KATI Eating:  [facility] Grooming: [facility] Bathing:  [facility] Upper Body Dressing:  [facility] Lower Body Dressing:  [facility] Toileting:  [facility] Bladder Management  Level of Assistance:  [place]  Frequency/Number of Accidents this Shift:  [facility] Bowel Management  Level of Assistance: [place]  Frequency/Number of Accidents this Shift: [facility] Bed/Chair/Wheelchair Transfer:  [facility] Toilet Transfer:  [facility] Tub/Shower Transfer:  [place]    Previously Documented Mode of Locomotion at Discharge: Field  KATI Expected Mode of Locomotion at Discharge: [facility] Walk/Wheelchair:  [facility] Stairs:  [facility] Comprehension:  Both ( auditory and visual) modes of comprehension are used  equally. Comprehension Score = 6, Modified [place].  Patient comprehends  complex/abstract information in their primary language with only mild  difficulty. forgetful and confused. sitter at bedside to help  KATI Expression:  Both ( vocal and non-vocal) modes of expression are used  equally. Patient does not express complex/abstract information in their primary  language without a helper. Expression Score = 2, Maximal Prompting. Patient  expresses basic daily needs 25-49% of the time. Patient uses only single words  or gestures and requires maximal/a lot of prompting (most of the time). Patient  requires the following assistive device(s): Communication board/device. only  speak Yes/No and uses communication sheet sometimes.Forgetful and confused.  sitter at bedside to help .  KATI Social Interaction:  Social Interaction Score = 1, Total Assistance. Patient  interacts appropriately less than 25% of the time.  Patient requires total  assistance (directing all or almost all of the time) for the following  behavior(s): Severely withdrawn. forgetful and confused. sitter at bedside to  help .  KATI Problem Solving:  Patient  does not make appropriate decisions in order to  solve complex problems without assistance from a helper. Problem Solving Score =  1, Total Assistance. Patient makes appropriate decisions in order to solve  routine problems less than 25% of the time. Patient requires total direction for  the following behavior(s): Difficulty with self-correction. Exhibits poor  planning. Impulsivity. Inability to follow multi-step commands. Poor judgment.  forgetful and confused. sitter at bedside to help .  KATI Memory:  Memory Score = 1, Total Assistance. Patient recognizes and  remembers less than 25% of the time. Patient requires total assistance  (prompting all or almost all of the time) for memory for the following:  Disoriented to person, place, time or situation. Inability to follow multi-step  commands. Needs assistance for use of environmental cues. Unaware of daily  routine. forgetful and confused. sitter at bedside to help .    Therapy Mode Minutes  Occupational Therapy: [facility]  Physical Therapy: [facility]  Speech Language Pathology:  [facility]    Signed by: Dimitry Baldwin RN

## 2019-08-18 NOTE — PLAN OF CARE
Problem: Skin Injury Risk (Adult)  Goal: Skin Health and Integrity  Outcome: Ongoing (interventions implemented as appropriate)   08/18/19 0255   Skin Injury Risk (Adult)   Skin Health and Integrity making progress toward outcome       Problem: Fall Risk (Adult)  Goal: Absence of Fall  Outcome: Ongoing (interventions implemented as appropriate)   08/18/19 0255   Fall Risk (Adult)   Absence of Fall making progress toward outcome       Problem: Patient Care Overview  Goal: Plan of Care Review  Outcome: Ongoing (interventions implemented as appropriate)   08/18/19 0255   Patient Care Overview   IRF Plan of Care Review progress ongoing, continue   Progress, Functional Goals progressing to functional independence   Coping/Psychosocial   Plan of Care Reviewed With patient   OTHER   Outcome Summary Pt. is flat and cooperative with staff. Denied any pain. Forgetful and confused at times. Very tired and sleepy. Takes Meds crushed in AS. Sitter at bedside at night. AC&HS. BG is 84 at 2032. Insulin no given. And then, apple sauce and Nectar apple juice applied. BG is 103 at 2215. BP is stable. Had once BM in BR with sitter. Red Arm band on. No further issues to note at this time. Will continue to monitor.     Goal: Coping Plan  Outcome: Ongoing (interventions implemented as appropriate)   08/18/19 0255   Coping Plan   Demonstration of Effective Coping Strategies demonstrating adequate progress       Problem: Stroke (IRF) (Adult)  Goal: Promote Optimal Functional [place]  Outcome: Ongoing (interventions implemented as appropriate)   08/18/19 0255   Stroke (IRF) (Adult)   Promote Optimal Functional [place] demonstrating adequate progress

## 2019-08-18 NOTE — PROGRESS NOTES
Pt is tolerating the abx for the UTI and seems to be better. Took her meds and ate well today.   is wondering if the Paxil is causing, told him we would see how she responds with treatment of UTI, then assess.     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  If add Namenda, will probably discontinue amantadine as she continues alert.  August 11-she has some increased irritability at times.  This may be related to the underlying stroke deficits itself.  She does not appear to have any dysuria, no odor to her urine.  Staff reports that for the most part she is eating okay.  Will check a follow-up CT of the head to assess for any interval visual changes.  She is on aspirin and Eliquis for stroke prophylaxis.  She had noticeably improved alertness when amantadine was started.  She is readily alert now.  This may be related to natural recovery in terms of her level of arousal at this point.  Current plan is to discontinue the amantadine to see if that helps with her irritability and start  on Namenda for aphasia.  August 12 -  Patient with increased restlessness at times.   Continues aphasia. Not able to identify any complaint.  Appears anxious today. No change on CT head yesterday. Started on Namenda for aphasia on 8/12/19.   August 13- will continue to monitor on recent med adjustments   August 15-She continues with expressive language deficits.    Again appears anxious related to her aphasia and difficulty expressing herself.    She will be able to  get occasional single word for the examiner.  She does follow instructions.  The patient was reviewed with .  Discussed adding on an antidepressant with anxiolytic properties -Paxil-as it appears frustration from her aphasia with associated anxiety with the frustration affects her performance.  We will plan on starting Paxil 10 mg daily tomorrow and monitor response.  Reviewed with the patient's  that would not add a short acting anxiolytic (such as a benzodiazepine or Seroquel) as it may affect her cognitive performance.    August 16-started Paxil 10 mg daily     Aphasia -  August 12 - trial of Namenda  August 16-continue to observe on Namenda 5 mg daily for her aphasia and may possibly titrate up next week     Dysphagia-Cortrak feeding tube discontinued on July 31 and started on puréed/honey thick liquid diet with strategies  August 7-advance to fork mash nectar thick liquid diet, consistent carbohydrate.  August 14 - repeat VFSS to assess for advancing to thin liquids  August 15-Video fluoroscopic swallow study today-mechanical soft, no mixed consistency, nectar thick liquid, water protocol between meals, no straws.     History of multiple bilateral strokes and left central retinal artery occlusion     History of left greater than right internal carotid artery stenosis-status post left internal carotid artery stent July 17, 2019     History of hypertension -  Hyzaar 100-25. August 4 - Bystolic increased to 20 mg daily to 20 mg bid.  August 8 - BP still runs high. On discharge in May 2019 was on Hyzaar 100-25 mg daily, Bystolic 20 mg daily, amlodipine 10 mg daily, Hydralazine 50 mg q 8 hours.  Will add Hydralazine initially 10 mg po q 8 hours and titrate up as needed.   August 9-systolic blood pressure elevated last night and early this morning but better this afternoon at 122-130.  Continue to follow recent addition of hydralazine and titrate up as needed  August 10-systolic blood pressure 175 this  afternoon, range otherwise 122-142.  Will titrate up on hydralazine to 20 mg every 8 hours.  August 11-hypertension overall appears improved.  Will titrate up further to 25 mg every 8 hours.  August 12 - add amlodipine 5 mg daily.   August 14 - titrate up on hydralazine to 50 mg q 8 hours  August 15-blood pressure improved this afternoon with systolic blood pressure in the 120s-follow on current regimen  August 16-blood pressure range 110////59-will continue to monitor on present regimen     History of diabetes mellitus -Lantus/glipizide (home medication metformin 1000 mg twice daily and glipizide 10 mg twice daily)  August 1-blood sugars were low yesterday afternoon after tube feeds discontinued.  Held glipizide last night and this morning and Lantus last night.  Blood sugar elevated at noontime today.  Will receive resume glipizide at a lower dose of 5 mg twice a day with meals.  Continue sliding scale insulin coverage.  She also is on metformin at home.  August 5-add back metformin initially at 500 mg twice a day and continue glipizide 5 mg twice a day, both one half of previous home dose, titrate up as needed.  August 7-titrate up metformin to 850 mg twice a day.  Add consistent carbohydrate restriction to diet.  August 9-increase metformin to 1000 g twice a day.  August 10-blood glucose 340 last evening but 150-114-178 so far today  August 11-continue to follow blood sugar pattern and may increase glipizide further as current dose glipizide 5 mg twice a day along with metformin 1000 mg twice a day  August 14 - blood glucose  past 24 hours - monitor pattern.  August 15-blood glucose [phone]--155-66-continue to follow pattern.  Will change sliding scale insulin coverage to Humalog at a lower dose.  She was started on the regular insulin sliding scale when she was on tube feeds.  August 16 recent blood glucose -475-138     Stroke  prophylaxis-aspirin/Eliquis/atorvastatin     Anemia-August 1-hemoglobin 7.4.  Most recent check on July 23 HGB 9.3.  Will recheck hemoglobin this afternoon.  Hemoccult stool.  Iron studies.  Reticulocyte count.  Recheck CBC in the a.m.  Added Protonix.  Patient is on aspirin and Eliquis.  With recent stroke  will look to transfuse packed red blood cell.  August 2-hemoglobin improved 9.0-1 unit packed red blood cells.  Elevated reticulocyte count in response to anemia.  Nursing describes dark stool.  Patient discussed with gastroenterology.  At this point unable to do endoscopy as on Eliquis and aspirin.  Will treat symptomatically for now with increasing proton pump inhibitor and transfusing as needed.  August 5-anemia improved-hemoglobin 9.8  August 16-hemoglobin unchanged 9.8     DVT prophylaxis-SCDs/anticoagulation     Impulsivity-   Nursing to do re-orientation with the patient.  Room close to the nursing station.     Endocrine-vitamin D deficiency-ergocalciferol 50,000 units weekly x8 weeks added. Vitamin B12 level and TSH checked in late May unremarkable        8/17- U/A positive, could explain the new increased agitation.  Will add cefdinir until culture is back  No other changes at this time  Monitor the addition of Paxil     8/18- Complete abx for UTI- culture pending  Will monitor to see if needs change in Paxil    -

## 2019-08-18 NOTE — PROGRESS NOTES
Inpatient Rehabilitation Plan of Care Note    Plan of Care  Care Plan Reviewed - No updates at this time.    Safety    Performed Intervention(s)  Safety rounds  Items within reach  24 hr sitter      Sphincter Control    Performed Intervention(s)  Monitor intake and output  Encourage fluid intake  Elimination schedule    Signed by: Dimitry Baldwin RN

## 2019-08-19 LAB
ANION GAP SERPL CALCULATED.3IONS-SCNC: 8.7 MMOL/L (ref 5–15)
BASOPHILS # BLD AUTO: 0.05 10*3/MM3 (ref 0–0.2)
BASOPHILS NFR BLD AUTO: 0.9 % (ref 0–1.5)
BUN BLD-MCNC: 12 MG/DL (ref 8–23)
BUN/CREAT SERPL: 19 (ref 7–25)
CALCIUM SPEC-SCNC: 8.9 MG/DL (ref 8.6–10.5)
CHLORIDE SERPL-SCNC: 101 MMOL/L (ref 98–107)
CO2 SERPL-SCNC: 27.3 MMOL/L (ref 22–29)
CREAT BLD-MCNC: 0.63 MG/DL (ref 0.57–1)
DEPRECATED RDW RBC AUTO: 55.3 FL (ref 37–54)
EOSINOPHIL # BLD AUTO: 0.16 10*3/MM3 (ref 0–0.4)
EOSINOPHIL NFR BLD AUTO: 3 % (ref 0.3–6.2)
ERYTHROCYTE [DISTWIDTH] IN BLOOD BY AUTOMATED COUNT: 15.9 % (ref 12.3–15.4)
GFR SERPL CREATININE-BSD FRML MDRD: 92 ML/MIN/1.73
GLUCOSE BLD-MCNC: 168 MG/DL (ref 65–99)
GLUCOSE BLDC GLUCOMTR-MCNC: 102 MG/DL (ref 70–130)
GLUCOSE BLDC GLUCOMTR-MCNC: 107 MG/DL (ref 70–130)
GLUCOSE BLDC GLUCOMTR-MCNC: 147 MG/DL (ref 70–130)
GLUCOSE BLDC GLUCOMTR-MCNC: 192 MG/DL (ref 70–130)
HCT VFR BLD AUTO: 33.7 % (ref 34–46.6)
HGB BLD-MCNC: 10.3 G/DL (ref 12–15.9)
IMM GRANULOCYTES # BLD AUTO: 0.01 10*3/MM3 (ref 0–0.05)
IMM GRANULOCYTES NFR BLD AUTO: 0.2 % (ref 0–0.5)
LYMPHOCYTES # BLD AUTO: 0.87 10*3/MM3 (ref 0.7–3.1)
LYMPHOCYTES NFR BLD AUTO: 16.4 % (ref 19.6–45.3)
MCH RBC QN AUTO: 28.9 PG (ref 26.6–33)
MCHC RBC AUTO-ENTMCNC: 30.6 G/DL (ref 31.5–35.7)
MCV RBC AUTO: 94.4 FL (ref 79–97)
MONOCYTES # BLD AUTO: 0.34 10*3/MM3 (ref 0.1–0.9)
MONOCYTES NFR BLD AUTO: 6.4 % (ref 5–12)
NEUTROPHILS # BLD AUTO: 3.89 10*3/MM3 (ref 1.7–7)
NEUTROPHILS NFR BLD AUTO: 73.1 % (ref 42.7–76)
NRBC BLD AUTO-RTO: 0 /100 WBC (ref 0–0.2)
PLATELET # BLD AUTO: 382 10*3/MM3 (ref 140–450)
PMV BLD AUTO: 9.5 FL (ref 6–12)
POTASSIUM BLD-SCNC: 3.8 MMOL/L (ref 3.5–5.2)
RBC # BLD AUTO: 3.57 10*6/MM3 (ref 3.77–5.28)
SODIUM BLD-SCNC: 137 MMOL/L (ref 136–145)
WBC NRBC COR # BLD: 5.32 10*3/MM3 (ref 3.4–10.8)

## 2019-08-19 PROCEDURE — 97535 SELF CARE MNGMENT TRAINING: CPT

## 2019-08-19 PROCEDURE — 97112 NEUROMUSCULAR REEDUCATION: CPT

## 2019-08-19 PROCEDURE — 63710000001 INSULIN LISPRO (HUMAN) PER 5 UNITS: Performed by: PHYSICAL MEDICINE & REHABILITATION

## 2019-08-19 PROCEDURE — 85025 COMPLETE CBC W/AUTO DIFF WBC: CPT | Performed by: PHYSICAL MEDICINE & REHABILITATION

## 2019-08-19 PROCEDURE — 92507 TX SP LANG VOICE COMM INDIV: CPT

## 2019-08-19 PROCEDURE — 82962 GLUCOSE BLOOD TEST: CPT

## 2019-08-19 PROCEDURE — 97110 THERAPEUTIC EXERCISES: CPT

## 2019-08-19 PROCEDURE — 80048 BASIC METABOLIC PNL TOTAL CA: CPT | Performed by: PHYSICAL MEDICINE & REHABILITATION

## 2019-08-19 RX ORDER — GLIPIZIDE 5 MG/1
7.5 TABLET ORAL
Status: DISCONTINUED | OUTPATIENT
Start: 2019-08-19 | End: 2019-08-21

## 2019-08-19 RX ORDER — AMLODIPINE BESYLATE 5 MG/1
5 TABLET ORAL
Status: DISCONTINUED | OUTPATIENT
Start: 2019-08-19 | End: 2019-08-29 | Stop reason: HOSPADM

## 2019-08-19 RX ORDER — ACETAMINOPHEN 500 MG
1000 TABLET ORAL EVERY 6 HOURS PRN
Status: DISCONTINUED | OUTPATIENT
Start: 2019-08-19 | End: 2019-08-29 | Stop reason: HOSPADM

## 2019-08-19 RX ADMIN — PAROXETINE HYDROCHLORIDE 10 MG: 10 TABLET, FILM COATED ORAL at 07:51

## 2019-08-19 RX ADMIN — CEFDINIR 300 MG: 300 CAPSULE ORAL at 21:38

## 2019-08-19 RX ADMIN — LANSOPRAZOLE 30 MG: KIT at 06:04

## 2019-08-19 RX ADMIN — APIXABAN 5 MG: 5 TABLET, FILM COATED ORAL at 07:47

## 2019-08-19 RX ADMIN — NEBIVOLOL HYDROCHLORIDE 20 MG: 10 TABLET ORAL at 07:46

## 2019-08-19 RX ADMIN — HYDRALAZINE HYDROCHLORIDE 50 MG: 50 TABLET, FILM COATED ORAL at 06:04

## 2019-08-19 RX ADMIN — LOSARTAN POTASSIUM: 50 TABLET, FILM COATED ORAL at 07:47

## 2019-08-19 RX ADMIN — ATORVASTATIN CALCIUM 80 MG: 80 TABLET, FILM COATED ORAL at 21:38

## 2019-08-19 RX ADMIN — BRIMONIDINE TARTRATE 1 DROP: 2 SOLUTION OPHTHALMIC at 21:38

## 2019-08-19 RX ADMIN — INSULIN LISPRO 2 UNITS: 100 INJECTION, SOLUTION INTRAVENOUS; SUBCUTANEOUS at 11:52

## 2019-08-19 RX ADMIN — HYDRALAZINE HYDROCHLORIDE 50 MG: 50 TABLET, FILM COATED ORAL at 14:10

## 2019-08-19 RX ADMIN — NYSTATIN 500000 UNITS: 100000 SUSPENSION ORAL at 16:54

## 2019-08-19 RX ADMIN — LANSOPRAZOLE 30 MG: KIT at 16:55

## 2019-08-19 RX ADMIN — AMLODIPINE BESYLATE 5 MG: 5 TABLET ORAL at 21:38

## 2019-08-19 RX ADMIN — GLIPIZIDE 5 MG: 5 TABLET ORAL at 07:48

## 2019-08-19 RX ADMIN — NYSTATIN 500000 UNITS: 100000 SUSPENSION ORAL at 07:51

## 2019-08-19 RX ADMIN — METFORMIN HYDROCHLORIDE 1000 MG: 1000 TABLET ORAL at 07:48

## 2019-08-19 RX ADMIN — GLIPIZIDE 7.5 MG: 5 TABLET ORAL at 16:54

## 2019-08-19 RX ADMIN — ACETAMINOPHEN 1000 MG: 500 TABLET, FILM COATED ORAL at 03:51

## 2019-08-19 RX ADMIN — NYSTATIN 500000 UNITS: 100000 SUSPENSION ORAL at 11:52

## 2019-08-19 RX ADMIN — NYSTATIN 500000 UNITS: 100000 SUSPENSION ORAL at 21:38

## 2019-08-19 RX ADMIN — METFORMIN HYDROCHLORIDE 1000 MG: 1000 TABLET ORAL at 16:54

## 2019-08-19 RX ADMIN — ASPIRIN 325 MG: 325 TABLET ORAL at 07:46

## 2019-08-19 RX ADMIN — CEFDINIR 300 MG: 300 CAPSULE ORAL at 07:46

## 2019-08-19 RX ADMIN — AMLODIPINE BESYLATE 5 MG: 5 TABLET ORAL at 11:51

## 2019-08-19 RX ADMIN — POTASSIUM CHLORIDE 20 MEQ: 1.5 POWDER, FOR SOLUTION ORAL at 07:51

## 2019-08-19 RX ADMIN — MEMANTINE HYDROCHLORIDE 5 MG: 5 TABLET, FILM COATED ORAL at 07:50

## 2019-08-19 RX ADMIN — APIXABAN 5 MG: 5 TABLET, FILM COATED ORAL at 21:38

## 2019-08-19 RX ADMIN — DORZOLAMIDE HYDROCHLORIDE 1 DROP: 20 SOLUTION/ DROPS OPHTHALMIC at 07:49

## 2019-08-19 RX ADMIN — BRIMONIDINE TARTRATE 1 DROP: 2 SOLUTION OPHTHALMIC at 07:49

## 2019-08-19 RX ADMIN — HYDRALAZINE HYDROCHLORIDE 50 MG: 50 TABLET, FILM COATED ORAL at 21:38

## 2019-08-19 RX ADMIN — NEBIVOLOL HYDROCHLORIDE 20 MG: 10 TABLET ORAL at 21:38

## 2019-08-19 RX ADMIN — DORZOLAMIDE HYDROCHLORIDE 1 DROP: 20 SOLUTION/ DROPS OPHTHALMIC at 21:38

## 2019-08-19 NOTE — PLAN OF CARE
Problem: Skin Injury Risk (Adult)  Goal: Skin Health and Integrity  Outcome: Ongoing (interventions implemented as appropriate)      Problem: Fall Risk (Adult)  Goal: Absence of Fall  Outcome: Ongoing (interventions implemented as appropriate)      Problem: Patient Care Overview  Goal: Plan of Care Review  Outcome: Ongoing (interventions implemented as appropriate)    Goal: Home Safety Plan  Outcome: Ongoing (interventions implemented as appropriate)    Goal: Coping Plan  Outcome: Ongoing (interventions implemented as appropriate)    Goal: Community Reintegration Plan  Outcome: Ongoing (interventions implemented as appropriate)      Problem: Stroke (IRF) (Adult)  Goal: Promote Optimal Functional [place]  Outcome: Ongoing (interventions implemented as appropriate)

## 2019-08-19 NOTE — PROGRESS NOTES
Inpatient Rehabilitation Plan of Care Note    Plan of Care  Care Plan Reviewed - No updates at this time.    Sphincter Control    Performed Intervention(s)  Monitor intake and output  Encourage fluid intake  Elimination schedule    Signed by: Cherise Arriaza RN

## 2019-08-19 NOTE — PROGRESS NOTES
Inpatient Rehabilitation Functional Measures Assessment and Plan of Care    Plan of Care  Updated Problems/Interventions  Mobility    [PT] Bed/Chair/Wheelchair(Active)  Current Status(08/19/2019): CGA/Min  Weekly Goal(08/23/2019): CGA  Discharge Goal: CGA    [PT] Walk(Active)  Current Status(08/19/2019): 220, CGA  Weekly Goal(08/23/2019): to and from BR, CGA  Discharge Goal: 250` CGA    [PT] Stairs(Active)  Current Status(08/19/2019): 4 steps CGA  Weekly Goal(08/23/2019): PT only  Discharge Goal: 4 steps CGA    Functional Measures  KATI Eating:  Branch  KATI Grooming: Branch  KATI Bathing:  Branch  KATI Upper Body Dressing:  Branch  KATI Lower Body Dressing:  Branch  KATI Toileting:  Branch    KATI Bladder Management  Level of Assistance:  Branch  Frequency/Number of Accidents this Shift:  Branch    KATI Bowel Management  Level of Assistance: Branch  Frequency/Number of Accidents this Shift: Branch    KATI Bed/Chair/Wheelchair Transfer:  Branch  KATI Toilet Transfer:  Branch  KATI Tub/Shower Transfer:  Branch    Previously Documented Mode of Locomotion at Discharge: Field  [facility] Expected Mode of Locomotion at Discharge: Branch  KATI Walk/Wheelchair:  Branch  KATI Stairs:  Branch    KATI Comprehension:  Branch  KATI Expression:  Branch  KATI Social Interaction:  Branch  [facility] Problem Solving:  Branch  KATI Memory:  Branch    Therapy Mode Minutes  Occupational Therapy: Branch  Physical Therapy: Branch  Speech Language Pathology:  Branch    Signed by: Poppy Rosa, PT

## 2019-08-19 NOTE — PROGRESS NOTES
Inpatient Rehabilitation Functional Measures Assessment    Functional Measures  KATI Eating:  [facility] Grooming: [facility] Bathing:  [facility] Upper Body Dressing:  [facility] Lower Body Dressing:  [facility] Toileting:  [facility] Bladder Management  Level of Assistance:  [place]  Frequency/Number of Accidents this Shift:  [facility] Bowel Management  Level of Assistance: [place]  Frequency/Number of Accidents this Shift: [facility] Bed/Chair/Wheelchair Transfer:  [facility] Toilet Transfer:  [facility] Tub/Shower Transfer:  [place]    Previously Documented Mode of Locomotion at Discharge: Field  KATI Expected Mode of Locomotion at Discharge: [facility] Walk/Wheelchair:  [facility] Stairs:  [facility] Comprehension:  Auditory comprehension is the usual mode. Comprehension  Score = 6, Modified [place].  Patient comprehends complex/abstract  information in their primary language, requiring:  KATI Expression:  Non-vocal expression is the usual mode. Expression Score = 6,  Modified Independent. Patient expresses complex/abstract information in their  primary language, requiring:  KATI Social Interaction:  Social Interaction Score = 6, Modified Independent.  Patient is modified independent for social interaction, requiring:  KATI Problem Solving:  Problem Solving Score = 6, Modified [place].  Patient  makes appropriate decisions in order to solve complex problems, but requires  extra time.  KATI Memory:  Memory Score = 2, Maximal Prompting. Patient recognizes and  remembers 25-49% of the time. Patient requires maximal/a lot of prompting (most  of the time) for memory for the following:    Therapy Mode Minutes  Occupational Therapy: Branch  Physical Therapy: Branch  Speech Language Pathology:  Branch    Signed by: Luis Abernathy RN

## 2019-08-19 NOTE — THERAPY TREATMENT NOTE
Inpatient Rehabilitation - Physical Therapy Treatment Note  [facility]     Patient Name: Darby Workman  : 1945  MRN: [id number]    Today's Date: 2019                 Admit Date: 2019      Visit Dx:    No diagnosis found.    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)   • Right-sided headache   • Acute ischemic stroke (CMS/HCC)   • Embolic stroke (CMS/HCC)   • Cerebral infarction due to stenosis of left carotid artery (CMS/HCC)   • Anticoagulated by anticoagulation treatment   • Stroke (cerebrum) (CMS/HCC)       Therapy Treatment    IRF Treatment Summary     Row Name 19 1526 19 1400 19 1100       Evaluation/Treatment Time and Intent    Subjective Information  no complaints  -AF  no complaints  -KB  no complaints  -KB    Existing Precautions/Restrictions  fall  -AF  fall communication, swallow  -KB  fall communication, swallow  -KB    Document Type  therapy note (daily note)  -AF  therapy note (daily note)  -KB  therapy note (daily note)  -KB    Mode of Treatment  occupational therapy  -AF  speech-language pathology  -KB  speech-language pathology  -KB    Patient/Family Observations  sitting up in dining room in AM and in PM in therapy gym, in AM required encouragement   -AF  seated in wc; agreeable to therapy  -KB  seated in sun room with sitter; agreeable to therapy  -KB    Start Time (Evaluation/Treatment)  --  1400  -KB  1100  -KB    Stop Time (Evaluation/Treatment)  --  1430  -KB  1130  -KB    Recorded by [AF] Ingris Ansari OTR [KB] Bruton, Katherine L [KB] Bruton, Katherine L    Row Name 19 1000             Evaluation/Treatment Time and Intent    Subjective Information  complains of being cold by demonstration cue from pt   -KELBY      Existing Precautions/Restrictions  fall communication,  swallow  -KELBY      Document Type  therapy note (daily note)  -KELBY      Mode of Treatment  physical therapy  -KELBY      Patient/Family Observations  Pt lying in hospital bed in no acute distress with sitter present.   -KELBY      Recorded by [KELBY] Poppy Rosa PT      Row Name 08/19/19 1526 08/19/19 1000          Cognition/Psychosocial- PT/OT    Affect/Mental Status (Cognitive)  confused  -AF  confused  -KELBY     Behavioral Issues (Cognitive)  --  overwhelmed easily;uncooperative  -KELBY     Orientation Status (Cognition)  unable/difficult to assess  -AF  unable/difficult to assess  -KELBY     Follows Commands (Cognition)  50-74% accuracy;follows one step commands;physical/tactile prompts required;verbal cues/prompting required;increased processing time needed;delayed response/completion  -AF  follows one step commands;0-24% accuracy;25-49% accuracy;delayed response/completion;physical/tactile prompts required;verbal cues/prompting required;visual queue  -KELBY     Personal Safety Interventions  fall prevention program maintained;gait belt;nonskid shoes/slippers when out of bed  -AF  fall prevention program maintained;gait belt;muscle strengthening facilitated;supervised activity  -KELBY     Cognitive Function (Cognitive)  attention deficit  -AF  --     Attention Deficit (Cognitive)  moderate deficit  -AF  --     Memory Deficit (Cognitive)  unable/difficult to assess  -AF  unable/difficult to assess  -KELBY     Safety Deficit (Cognitive)  awareness of need for assistance;insight into deficits/self awareness  -AF  awareness of need for assistance;insight into deficits/self awareness;safety precautions follow-through/compliance  -KELBY     Recorded by [AF] Ingris Ansari, OTR [KELBY] Poppy Rosa PT     Row Name 08/19/19 1526 08/19/19 1000          Bed Mobility Assessment/Treatment    Supine-Sit [place] (Bed Mobility)  --  supervision  -KELBY     Sit-Supine [place] (Bed Mobility)  supervision  -AF  --     Recorded by [AF]  Ingris Ansari OTR [KELBY] Poppy Rsoa, PT     Row Name 08/19/19 1526             Chair-Bed Transfer    Chair-Bed [place] (Transfers)  stand by assist in AM and pM sessions   -AF      Recorded by [AF] Ingris Ansari OTR      Row Name 08/19/19 1000             Sit-Stand Transfer    Sit-Stand [place] (Transfers)  stand by assist;contact guard;moderate assist (50% patient effort);maximum assist (25% patient effort);2 person assist varied; mostly SBA/CG; 2x refused to get up mod/max of 2then  -KELBY      Assistive Device (Sit-Stand Transfers)  other (see comments) HHA to no device   -KELBY      Recorded by [KELBY] Poppy Rosa, PT      Row Name 08/19/19 1000             Stand-Sit Transfer    Stand-Sit [place] (Transfers)  contact guard no device   -KELBY      Recorded by [KELBY] Poppy Rosa, PT      Row Name 08/19/19 1000             Toilet Transfer    [place] Level (Toilet Transfer)  contact guard;stand by assist;verbal cues  -KELBY      Assistive Device (Toilet Transfer)  commode;grab bars/safety frame in PM  -KELBY      Recorded by [KELBY] Poppy Rosa, PT      Row Name 08/19/19 1526             Shower Transfer    Type (Shower Transfer)  stand pivot/stand step  -AF      [place] Level (Shower Transfer)  2 person assist;maximum assist (25% patient effort);moderate assist (50% patient effort)  -AF      Assistive Device (Shower Transfer)  grab bars/tub rail;tub bench;wheelchair required assistance of 2 to stand and transfer to shower  -AF      Recorded by [AF] Ingris Ansari OTR      Row Name 08/19/19 1000             Car Transfer    [place] Level (Car Transfer)  stand by assist;contact guard  -KELBY      Recorded by [KELBY] Poppy Rosa, PT      Row Name 08/19/19 1000             Gait/Stairs Assessment/Training    [place] Level (Gait)  contact guard  -KELBY      Assistive Device (Gait)  other (see comments) no device   -KELBY      Distance in Feet (Gait)  200' x 3   -KELBY       Deviations/Abnormal Patterns (Gait)  gait speed decreased;stride length decreased shuffling gait   -KELBY      Bilateral Gait Deviations  heel strike decreased;forward flexed posture decreased armswing B, R UE flexed at elbow   -KELBY      [place] Level (Stairs)  contact guard  -KELBY      Handrail Location (Stairs)  right side (ascending)  -KELBY      Number of Steps (Stairs)  4  -KELBY      Ascending Technique (Stairs)  step-over-step  -KELBY      Descending Technique (Stairs)  step-over-step  -KELBY      Stairs, Safety Issues  weight-shifting ability decreased;balance decreased during turns  -KELBY      Stairs, Impairments  strength decreased;sensation decreased;impaired balance  -KELBY      [place] Level (Ramp)  contact guard;verbal cues HHA  -KELBY      Recorded by [KELBY] Poppy Rosa PT      Row Name 08/19/19 1526             Bathing Assessment/Treatment    Bathing [place] Level  bathing skills;maximum assist (25% patient effort);verbal cues  -AF      Assistive Device (Bathing)  grab bar/tub rail;hand held shower spray hose;tub bench  -AF      Bathing Position  supported sitting;supported standing  -AF      Comment (Bathing)  refused to bath, therapist had to complete most of the bathing tasks   -AF      Recorded by [AF] Ingris Ansari OTR      Row Name 08/19/19 1526             Upper Body Dressing Assessment/Treatment    Upper Body Dressing Task  upper body dressing skills;minimum assist (75% or more patient effort);verbal cues  -AF      Upper Body Dressing Position  supported sitting  -AF      Comment (Upper Body Dressing)  with sequencing assistance   -AF      Recorded by [AF] Ingris Ansari OTR      Row Name 08/19/19 1526             Lower Body Dressing Assessment/Treatment    Lower Body Dressing [place] Level  doff;don;pants/bottoms;shoes/slippers;socks;underwear;moderate assist (50% patient effort)  -AF      Lower Body Dressing Position  supported sitting;supported standing  -AF      Comment (Lower  Body Dressing)  dependent to doff clothes and MIN to don clothes   -AF      Recorded by [AF] Ingris Ansari, OTKEVIN      Row Name 08/19/19 1526             Grooming Assessment/Treatment    Grooming [place] Level  grooming skills;moderate assist (50% patient effort);verbal cues  -AF      Grooming Position  sink side;supported sitting  -AF      Comment (Grooming)  assistance to set up  -AF      Recorded by [AF] Ingris Ansari OTR      Row Name 08/19/19 1526 08/19/19 1400 08/19/19 1100       Pain Scale: Numbers Pre/Post-Treatment    Pain Scale: Numbers, Pretreatment  0/10 - no pain  -AF  0/10 - no pain  -KB  0/10 - no pain  -KB    Pain Scale: Numbers, Post-Treatment  0/10 - no pain  -AF  0/10 - no pain  -KB  0/10 - no pain  -KB    Recorded by [AF] Ingris Ansari, OTR [KB] Bruton, Katherine L [KB] Bruton, Katherine L    Row Name 08/19/19 1000             Pain Scale: FACES Pre/Post-Treatment    Pain: FACES Scale, Pretreatment  0-->no hurt  -KELBY      Pain: FACES Scale, Post-Treatment  0-->no hurt  -KELBY      Recorded by [KELBY] Poppy Rosa, PT      Row Name 08/19/19 1000             Lower Extremity Standing Therapeutic Exercise    Performed, Standing Lower Extremity (Therapeutic Exercise)  mini-squats;heel raises  -KELBY      Device, Standing Lower Extremity (Therapeutic Exercise)  neftali bars  -KELBY      Exercise Type, Standing Lower Extremity (Therapeutic Exercise)  AROM (active range of motion)  -KELBY      Sets/Reps Detail, Standing Lower Extremity (Therapeutic Exercise)  1/8  -KELBY      Comment, Standing Lower Extremity (Therapeutic Exercise)  cues for demonstration   -KELBY      Recorded by [KELBY] Poppy Rosa, PT      Row Name 08/19/19 1000             Lower Extremity Seated Therapeutic Exercise    Performed, Seated Lower Extremity (Therapeutic Exercise)  LAQ (long arc quad), knee extension;hip flexion/extension;ankle dorsiflexion/plantarflexion  -KELBY      Exercise Type, Seated Lower Extremity (Therapeutic Exercise)   AROM (active range of motion)  -KELBY      Sets/Reps Detail, Seated Lower Extremity (Therapeutic Exercise)  1/8  -KELBY      Comment, Seated Lower Extremity (Therapeutic Exercise)  demonstration to do exercises   -KELBY      Recorded by [KELBY] Poppy Rosa, PT      Row Name 08/19/19 1526             Neuromuscular Re-education    Comment (Neuromuscular Re-education)  pt agreeable to play checkers, able to set up  board and attend during entire activity, with increased time able to problem solve through  task with min vc's  -AF      Recorded by [AF] Ingris Ansari, OTR      Row Name 08/19/19 1526 08/19/19 1000          Positioning and Restraints    Pre-Treatment Position  sitting in chair/recliner  -AF  in bed  -KELBY     Post Treatment Position  bed  -AF  wheelchair  -KELBY     In Bed  supine;call light within reach;encouraged to call for assist;exit alarm on with sitter  -AF  --     In Wheelchair  --  sitting with sitter   -KELBY     Recorded by [AF] Ingris Ansari, OTR [KELBY] Poppy Rosa, PT       User Key  (r) = Recorded By, (t) = Taken By, (c) = Cosigned By    Initials Name Effective Dates     Poppy Rosa, PT 06/08/18 -     Ingris Youssef, SALR 04/03/18 -     KB Bruton, Katherine L 03/07/18 -         Wound 07/17/19 1015 Left neck incision (Active)   Dressing Appearance open to air 8/19/2019  8:34 AM   Closure Liquid skin adhesive 8/19/2019  8:34 AM   Base dry;clean 8/19/2019  8:34 AM   Periwound Temperature warm 8/19/2019  8:34 AM   Periwound Skin Turgor firm 8/19/2019  8:34 AM   Drainage Amount none 8/19/2019  8:34 AM   Dressing Care, Wound open to air 8/19/2019  8:34 AM     Physical Therapy Education     Title: PT OT SLP Therapies (Not Started)     Topic: Physical Therapy (In Progress)     Point: Mobility training (In Progress)     Learning Progress Summary           Patient Acceptance, E,TB, NR by KELBY at 8/19/2019 12:11 PM    Nonacceptance, E,D, NR by DONI at 8/17/2019 11:44 AM     Acceptance, E,TB, VU,NR by KELBY at 8/14/2019  3:15 PM    Nonacceptance, E,TB,D, NR by ENID at 8/5/2019 12:00 PM    Acceptance, E,D, NR by NESTOR at 8/3/2019  1:35 PM    Acceptance, D, DU,NR by NESTOR at 8/3/2019  8:56 AM                   Point: Home exercise program (In Progress)     Learning Progress Summary           Patient Nonacceptance, E,D, NR by DONI at 8/17/2019 11:44 AM    Nonacceptance, E,TB,D, NR by ENID at 8/5/2019 12:00 PM                   Point: Body mechanics (In Progress)     Learning Progress Summary           Patient Nonacceptance, E,D, NR by DONI at 8/17/2019 11:44 AM                   Point: Precautions (In Progress)     Learning Progress Summary           Patient Nonacceptance, E,D, NR by DONI at 8/17/2019 11:44 AM    Acceptance, E, NR by MD at 8/16/2019  8:36 AM    Acceptance, E, NR by MD at 8/15/2019  8:59 AM    Acceptance, E, NR by MD at 8/13/2019 11:14 AM    Acceptance, E, NR by MD at 8/12/2019 10:45 AM    Acceptance, E, NR by MD at 8/10/2019 11:15 AM    Acceptance, E, NR by MD at 8/9/2019 11:12 AM    Acceptance, E, NR by MD at 8/8/2019 11:48 AM    Acceptance, E, NR by MD at 8/6/2019 10:41 AM    Acceptance, E, NR by MD at 8/2/2019 10:44 AM    Acceptance, E,D, NR by MD at 8/1/2019 12:16 PM                               User Key     Initials Effective Dates Name Provider Type Discipline    KELBY 06/08/18 -  Poppy Rosa, PT Physical Therapist PT    JS 04/06/17 -  Setph Salinas, PT Physical Therapist PT    JK 04/03/18 -  Nicole Austin, PT Physical Therapist PT    MD 04/03/18 -  Mira Chadwick, PT Physical Therapist PT    KP 04/03/18 -  Marycruz Schmitt, PT Physical Therapist PT                  PT Recommendation and Plan                        Time Calculation:     PT Charges     Row Name 08/19/19 2041 08/19/19 9437          Time Calculation    Start Time  1230  -KELBY  1000  -KELBY     Stop Time  1300  -KELBY  1030  -KELBY     Time Calculation (min)  30 min  -KELBY  30 min  -KELBY     PT Received On  --  08/19/19  -KELBY     PT -  Next Appointment  --  08/20/19  -KELBY        Time Calculation- PT    Total Timed Code Minutes- PT  30 minute(s)  -KELBY  30 minute(s)  -KELBY       User Key  (r) = Recorded By, (t) = Taken By, (c) = Cosigned By    Initials Name Provider Type    Poppy Trevino, PT Physical Therapist          Therapy Charges for Today     Code Description Service Date Service Provider Modifiers Qty    06506258705 HC PT THER PROC EA 15 MIN 8/19/2019 Poppy Rosa, PT GP 4                   Poppy Rosa, PT  8/19/2019

## 2019-08-19 NOTE — PROGRESS NOTES
Inpatient Rehabilitation Functional Measures Assessment and Plan of Care    Plan of Care  Updated Problems/Interventions  Mobility    [OT] Toilet Transfers(Active)  Current Status(08/19/2019): CGA/SBA  Weekly Goal(08/20/2019): CGA/SBA  Discharge Goal: CGA/SBA    [OT] Tub/Shower Transfers(Active)  Current Status(08/19/2019): CGA  Weekly Goal(08/20/2019): CGA  Discharge Goal: CGA        Self Care    [OT] Bathing(Active)  Current Status(08/19/2019): MIN/CGA  Weekly Goal(08/20/2019): MIN/CGA  Discharge Goal: MIN/CGA    [OT] Dressing (Lower)(Active)  Current Status(08/19/2019): CGA/MIN  Weekly Goal(08/20/2019): CGA/MIN  Discharge Goal: CGA/MIN    [OT] Dressing (Upper)(Active)  Current Status(08/19/2019): MIN with bra fastening  Weekly Goal(08/20/2019): MIN  Discharge Goal: MIN    [OT] Grooming(Active)  Current Status(08/19/2019): MIN  Weekly Goal(08/20/2019): MIN  Discharge Goal: MIN    [OT] Toileting(Active)  Current Status(08/19/2019): MOD  Weekly Goal(08/20/2019): MOD  Discharge Goal: MOD    Functional Measures  KATI Eating:  Branch  KATI Grooming: Branch  KATI Bathing:  Branch  KATI Upper Body Dressing:  Branch  KATI Lower Body Dressing:  Branch  KATI Toileting:  Branch    KATI Bladder Management  Level of Assistance:  Branch  Frequency/Number of Accidents this Shift:  Branch    KATI Bowel Management  Level of Assistance: [place]  Frequency/Number of Accidents this Shift: Branch    KATI Bed/Chair/Wheelchair Transfer:  Branch  KATI Toilet Transfer:  Branch  KATI Tub/Shower Transfer:  Branch    Previously Documented Mode of Locomotion at Discharge: Field  KATI Expected Mode of Locomotion at Discharge: Branch  KATI Walk/Wheelchair:  Branch  KATI Stairs:  Branch    KATI Comprehension:  Branch  KATI Expression:  Branch  KATI Social Interaction:  Branch  KATI Problem Solving:  Branch  KATI Memory:  Branch    Therapy Mode Minutes  Occupational Therapy: Branch  Physical Therapy: Branch  Speech Language Pathology:  Branch    Signed by: Ingris  Elan, OT

## 2019-08-19 NOTE — PROGRESS NOTES
Inpatient Rehabilitation Functional Measures Assessment    Functional Measures  KATI Eating:  [facility] Grooming: [facility] Bathing:  [facility] Upper Body Dressing:  [facility] Lower Body Dressing:  [facility] Toileting:  [facility] Bladder Management  Level of Assistance:  [place]  Frequency/Number of Accidents this Shift:  [facility] Bowel Management  Level of Assistance: [place]  Frequency/Number of Accidents this Shift: [facility] Bed/Chair/Wheelchair Transfer:  Bed/chair/wheelchair Transfer Score = 4.  Patient performs 75% or more of effort and minimal assistance (little/incidental  help/lifting of one limb/steadying) for transferring to and from the  bed/chair/wheelchair, requiring: No assistive devices were required.  KATI Toilet Transfer:  [facility] Tub/Shower Transfer:  [place]    Previously Documented Mode of Locomotion at Discharge: Field  KATI Expected Mode of Locomotion at Discharge: [facility] Walk/Wheelchair:  WHEELCHAIR OBSERVATION   Activity was not observed.    WALK OBSERVATION   Walk Distance Scale = 3.  Distance walked is greater than 150 feet. Walk Score  = 4.  Patient performs 75% or more of effort and requires minimal assistance.  Incidental help/contact guard/steadying was provided. Patient walked a distance  of  200 feet. Patient requires the following assistive device(s): sometimes HHA  .  KATI Stairs:  Stairs Score = 2.  Incidental assistance with lifting or lowering,  contact guard or steadying was provided. Patient performs 75% or more of effort  and requires minimal contact assistance. Patient negotiated  4 stairs. Patient  requires the following assistive device(s): Handrail(s).    KATI Comprehension:  [place]  KATI Expression:  [facility] Social Interaction:  [facility] Problem Solving:  [facility] Memory:  [place]    Therapy Mode Minutes  Occupational Therapy: [place]  Physical Therapy: Individual: 60 minutes.  Speech Language Pathology:  [place]    Signed by: Poppy  Moe, PT

## 2019-08-19 NOTE — PROGRESS NOTES
Inpatient Rehabilitation Functional Measures Assessment    Functional Measures  KATI Eating:  [facility] Grooming: [facility] Bathing:  [facility] Upper Body Dressing:  [facility] Lower Body Dressing:  [facility] Toileting:  [facility] Bladder Management  Level of Assistance:  [place]  Frequency/Number of Accidents this Shift:  [facility] Bowel Management  Level of Assistance: [place]  Frequency/Number of Accidents this Shift: [facility] Bed/Chair/Wheelchair Transfer:  [facility] Toilet Transfer:  [facility] Tub/Shower Transfer:  [place]    Previously Documented Mode of Locomotion at Discharge: Field  KATI Expected Mode of Locomotion at Discharge: [facility] Walk/Wheelchair:  [facility] Stairs:  [facility] Comprehension:  Both ( auditory and visual) modes of comprehension are used  equally. Patient does not comprehend complex/abstract information in their  primary language without assistance from a helper. Comprehension Score = 2,  Maximal Prompting. Patient comprehends basic daily needs 25-49% of the time.  Patient understands simple information via single words or gestures. Requires  maximal/a lot of prompting (most of the time). No assistive devices were  required.  KATI Expression:  Vocal expression is the usual mode. Expression Score = 7,  Independent.  Patient expresses complex/abstract information in their primary  language.  Patient is completely independent for vocal expression.  There are no  activity limitations.  KATI Social Interaction:  Social Interaction Score = 7, Independent. Patient is  completely independent for social interaction.  There are no activity  limitations.  KATI Problem Solving:  Activity was not observed.  KATI Memory:  Memory Score = 3, Moderate Prompting. Patient recognizes and  remembers 50-74% of the time. Patient requires moderate/some prompting  for  memory for the following:    Therapy Mode Minutes  Occupational Therapy: [place]  Physical Therapy: [place]  Speech  Language Pathology:  [place]    Signed by: Cherise Arriaza RN

## 2019-08-19 NOTE — PLAN OF CARE
"Problem: Skin Injury Risk (Adult)  Goal: Skin Health and Integrity  Outcome: Ongoing (interventions implemented as appropriate)      Problem: Fall Risk (Adult)  Goal: Absence of Fall  Outcome: Ongoing (interventions implemented as appropriate)      Problem: Patient Care Overview  Goal: Plan of Care Review  Outcome: Ongoing (interventions implemented as appropriate)   08/19/19 1105   Patient Care Overview   IRF Plan of Care Review progress ongoing, continue   Progress, Functional Goals demonstrating adequate progress   Coping/Psychosocial   Plan of Care Reviewed With patient   OTHER   Outcome Summary Patient does not complete sentences. Patient says \"yes\" and \"no\" but not appropiate to situation. One person assistance to the wheel chair. Sitter at bedside. Pt impulsive this am trying to get out of wheelchair and bed.       Problem: Stroke (IRF) (Adult)  Goal: Promote Optimal Functional [place]  Outcome: Ongoing (interventions implemented as appropriate)        "

## 2019-08-19 NOTE — THERAPY TREATMENT NOTE
Inpatient Rehabilitation - Occupational Therapy Treatment Note    [facility]     Patient Name: Darby Workman  : 1945  MRN: [id number]    Today's Date: 2019                 Admit Date: 2019      Visit Dx:  No diagnosis found.    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)   • Right-sided headache   • Acute ischemic stroke (CMS/HCC)   • Embolic stroke (CMS/HCC)   • Cerebral infarction due to stenosis of left carotid artery (CMS/HCC)   • Anticoagulated by anticoagulation treatment   • Stroke (cerebrum) (CMS/HCC)         Therapy Treatment    IRF Treatment Summary     Row Name 19 1526 19 1400 19 1100       Evaluation/Treatment Time and Intent    Subjective Information  no complaints  -AF  no complaints  -KB  no complaints  -KB    Existing Precautions/Restrictions  fall  -AF  fall communication, swallow  -KB  fall communication, swallow  -KB    Document Type  therapy note (daily note)  -AF  therapy note (daily note)  -KB  therapy note (daily note)  -KB    Mode of Treatment  occupational therapy  -AF  speech-language pathology  -KB  speech-language pathology  -KB    Patient/Family Observations  sitting up in dining room in AM and in PM in therapy gym, in AM required encouragement   -AF  seated in wc; agreeable to therapy  -KB  seated in sun room with sitter; agreeable to therapy  -KB    Start Time (Evaluation/Treatment)  --  1400  -KB  1100  -KB    Stop Time (Evaluation/Treatment)  --  1430  -KB  1130  -KB    Recorded by [AF] Ingris Ansari OTR [KB] Bruton, Katherine L [KB] Bruton, Katherine L    Row Name 19 1000             Evaluation/Treatment Time and Intent    Subjective Information  complains of being cold by demonstration cue from pt   -KELBY      Existing Precautions/Restrictions  fall  communication, swallow  -KELBY      Document Type  therapy note (daily note)  -KELBY      Mode of Treatment  physical therapy  -KELBY      Patient/Family Observations  Pt lying in hospital bed in no acute distress with sitter present.   -KELBY      Recorded by [KELBY] Poppy Rosa PT      Row Name 08/19/19 1526 08/19/19 1000          Cognition/Psychosocial- PT/OT    Affect/Mental Status (Cognitive)  confused  -AF  confused  -KELBY     Behavioral Issues (Cognitive)  --  overwhelmed easily;uncooperative  -KELBY     Orientation Status (Cognition)  unable/difficult to assess  -AF  unable/difficult to assess  -KELBY     Follows Commands (Cognition)  50-74% accuracy;follows one step commands;physical/tactile prompts required;verbal cues/prompting required;increased processing time needed;delayed response/completion  -AF  follows one step commands;0-24% accuracy;25-49% accuracy;delayed response/completion;physical/tactile prompts required;verbal cues/prompting required;visual queue  -KELBY     Personal Safety Interventions  fall prevention program maintained;gait belt;nonskid shoes/slippers when out of bed  -AF  fall prevention program maintained;gait belt;muscle strengthening facilitated;supervised activity  -KELBY     Cognitive Function (Cognitive)  attention deficit  -AF  --     Attention Deficit (Cognitive)  moderate deficit  -AF  --     Memory Deficit (Cognitive)  unable/difficult to assess  -AF  unable/difficult to assess  -KELBY     Safety Deficit (Cognitive)  awareness of need for assistance;insight into deficits/self awareness  -AF  awareness of need for assistance;insight into deficits/self awareness;safety precautions follow-through/compliance  -KELBY     Recorded by [AF] Ingris Ansari, OTR [KELBY] Poppy Rosa, PT     Row Name 08/19/19 1526 08/19/19 1000          Bed Mobility Assessment/Treatment    Supine-Sit [place] (Bed Mobility)  --  supervision  -KELBY     Sit-Supine [place] (Bed Mobility)  supervision  -AF  --      Recorded by [AF] Ingris Ansari OTR [KELBY] Poppy Rosa, PT     Row Name 08/19/19 1526             Chair-Bed Transfer    Chair-Bed [place] (Transfers)  stand by assist in AM and pM sessions   -AF      Recorded by [AF] Ingris Ansari OTR      Row Name 08/19/19 1000             Sit-Stand Transfer    Sit-Stand [place] (Transfers)  stand by assist;contact guard;moderate assist (50% patient effort);maximum assist (25% patient effort);2 person assist varied; mostly SBA/CG; 2x refused to get up mod/max of 2then  -KELBY      Assistive Device (Sit-Stand Transfers)  other (see comments) HHA to no device   -KELBY      Recorded by [KELBY] Poppy Rosa, PT      Row Name 08/19/19 1000             Stand-Sit Transfer    Stand-Sit [place] (Transfers)  contact guard no device   -KELBY      Recorded by [KELBY] Poppy Rosa, PT      Row Name 08/19/19 1000             Toilet Transfer    [place] Level (Toilet Transfer)  contact guard;stand by assist;verbal cues  -KELBY      Assistive Device (Toilet Transfer)  commode;grab bars/safety frame in PM  -KELBY      Recorded by [KELBY] Poppy Rosa, PT      Row Name 08/19/19 1526             Shower Transfer    Type (Shower Transfer)  stand pivot/stand step  -AF      [place] Level (Shower Transfer)  2 person assist;maximum assist (25% patient effort);moderate assist (50% patient effort)  -AF      Assistive Device (Shower Transfer)  grab bars/tub rail;tub bench;wheelchair required assistance of 2 to stand and transfer to shower  -AF      Recorded by [AF] Ingris Ansari OTR      Row Name 08/19/19 1000             Car Transfer    [place] Level (Car Transfer)  stand by assist;contact guard  -KELBY      Recorded by [KELBY] Poppy Rosa, PT      Row Name 08/19/19 1000             Gait/Stairs Assessment/Training    [place] Level (Gait)  contact guard  -KELBY      Assistive Device (Gait)  other (see comments) no device   -KELBY      Distance in Feet (Gait)  200' x 3    -KELBY      Deviations/Abnormal Patterns (Gait)  gait speed decreased;stride length decreased shuffling gait   -KELBY      Bilateral Gait Deviations  heel strike decreased;forward flexed posture decreased armswing B, R UE flexed at elbow   -KELBY      [place] Level (Stairs)  contact guard  -KELBY      Handrail Location (Stairs)  right side (ascending)  -KELBY      Number of Steps (Stairs)  4  -KELBY      Ascending Technique (Stairs)  step-over-step  -KELBY      Descending Technique (Stairs)  step-over-step  -KELBY      Stairs, Safety Issues  weight-shifting ability decreased;balance decreased during turns  -KELBY      Stairs, Impairments  strength decreased;sensation decreased;impaired balance  -KELBY      [place] Level (Ramp)  contact guard;verbal cues HHA  -KELBY      Recorded by [KELBY] Poppy Rosa PT      Row Name 08/19/19 1526             Bathing Assessment/Treatment    Bathing [place] Level  bathing skills;maximum assist (25% patient effort);verbal cues  -AF      Assistive Device (Bathing)  grab bar/tub rail;hand held shower spray hose;tub bench  -AF      Bathing Position  supported sitting;supported standing  -AF      Comment (Bathing)  refused to bath, therapist had to complete most of the bathing tasks   -AF      Recorded by [AF] Ingris Ansari OTR      Row Name 08/19/19 1526             Upper Body Dressing Assessment/Treatment    Upper Body Dressing Task  upper body dressing skills;minimum assist (75% or more patient effort);verbal cues  -AF      Upper Body Dressing Position  supported sitting  -AF      Comment (Upper Body Dressing)  with sequencing assistance   -AF      Recorded by [AF] Ingris Ansari OTR      Row Name 08/19/19 1526             Lower Body Dressing Assessment/Treatment    Lower Body Dressing [place] Level  doff;don;pants/bottoms;shoes/slippers;socks;underwear;moderate assist (50% patient effort)  -AF      Lower Body Dressing Position  supported sitting;supported standing  -AF      Comment  (Lower Body Dressing)  dependent to doff clothes and MIN to don clothes   -AF      Recorded by [AF] Ingris Ansari OTR      Row Name 08/19/19 1526             Grooming Assessment/Treatment    Grooming [place] Level  grooming skills;moderate assist (50% patient effort);verbal cues  -AF      Grooming Position  sink side;supported sitting  -AF      Comment (Grooming)  assistance to set up  -AF      Recorded by [AF] Ingris Ansari OTR      Row Name 08/19/19 1526 08/19/19 1400 08/19/19 1100       Pain Scale: Numbers Pre/Post-Treatment    Pain Scale: Numbers, Pretreatment  0/10 - no pain  -AF  0/10 - no pain  -KB  0/10 - no pain  -KB    Pain Scale: Numbers, Post-Treatment  0/10 - no pain  -AF  0/10 - no pain  -KB  0/10 - no pain  -KB    Recorded by [AF] Ingris Ansair, JOHN [KB] Bruton, Katherine L [KB] Bruton, Katherine L    Row Name 08/19/19 1000             Pain Scale: FACES Pre/Post-Treatment    Pain: FACES Scale, Pretreatment  0-->no hurt  -KELBY      Pain: FACES Scale, Post-Treatment  0-->no hurt  -KELBY      Recorded by [KELBY] Poppy Rosa, PT      Row Name 08/19/19 1000             Lower Extremity Standing Therapeutic Exercise    Performed, Standing Lower Extremity (Therapeutic Exercise)  mini-squats;heel raises  -KELBY      Device, Standing Lower Extremity (Therapeutic Exercise)  neftali bars  -KELBY      Exercise Type, Standing Lower Extremity (Therapeutic Exercise)  AROM (active range of motion)  -KELBY      Sets/Reps Detail, Standing Lower Extremity (Therapeutic Exercise)  1/8  -KELBY      Comment, Standing Lower Extremity (Therapeutic Exercise)  cues for demonstration   -KELBY      Recorded by [KELBY] Poppy Rosa PT      Row Name 08/19/19 1000             Lower Extremity Seated Therapeutic Exercise    Performed, Seated Lower Extremity (Therapeutic Exercise)  LAQ (long arc quad), knee extension;hip flexion/extension;ankle dorsiflexion/plantarflexion  -KELBY      Exercise Type, Seated Lower Extremity (Therapeutic  Exercise)  AROM (active range of motion)  -KELBY      Sets/Reps Detail, Seated Lower Extremity (Therapeutic Exercise)  1/8  -KELBY      Comment, Seated Lower Extremity (Therapeutic Exercise)  demonstration to do exercises   -KELBY      Recorded by [KELBY] Popyp Rosa, PT      Row Name 08/19/19 1526             Neuromuscular Re-education    Comment (Neuromuscular Re-education)  pt agreeable to play checkers, able to set up  board and attend during entire activity, with increased time able to problem solve through  task with min vc's  -AF      Recorded by [AF] Ingris Ansari, OTR      Row Name 08/19/19 1526 08/19/19 1000          Positioning and Restraints    Pre-Treatment Position  sitting in chair/recliner  -AF  in bed  -KELBY     Post Treatment Position  bed  -AF  wheelchair  -KELBY     In Bed  supine;call light within reach;encouraged to call for assist;exit alarm on with sitter  -AF  --     In Wheelchair  --  sitting with sitter   -KELBY     Recorded by [AF] Ingris Ansari, OTR [KELBY] Poppy Rosa, PT       User Key  (r) = Recorded By, (t) = Taken By, (c) = Cosigned By    Initials Name Effective Dates    KELBY Poppy Rosa, PT 06/08/18 -     Ingris Youssef, OTR 04/03/18 -     KB Bruton, Katherine L 03/07/18 -           Wound 07/17/19 1015 Left neck incision (Active)   Dressing Appearance open to air 8/19/2019  8:34 AM   Closure Liquid skin adhesive 8/19/2019  8:34 AM   Base dry;clean 8/19/2019  8:34 AM   Periwound Temperature warm 8/19/2019  8:34 AM   Periwound Skin Turgor firm 8/19/2019  8:34 AM   Drainage Amount none 8/19/2019  8:34 AM   Dressing Care, Wound open to air 8/19/2019  8:34 AM         OT Recommendation and Plan    Anticipated Equipment Needs At Discharge (OT Eval): bathing equipment  Planned Therapy Interventions (OT Eval): activity tolerance training, BADL retraining, cognitive/visual perception retraining, functional balance retraining, neuromuscular control/coordination  retraining, occupation/activity based interventions, patient/caregiver education/training, ROM/therapeutic exercise, strengthening exercise, transfer/mobility retraining                Occupational Therapy Education     Title: PT OT SLP Therapies (Not Started)     Topic: Occupational Therapy (In Progress)     Point: ADL training (In Progress)     Description: Instruct learner(s) on proper safety adaptation and remediation techniques during self care or transfers.   Instruct in proper use of assistive devices.    Learning Progress Summary           Patient Nonacceptance, E,D, NR by DONI at 8/17/2019 11:44 AM    Acceptance, E, VU,NR by AF at 8/15/2019  3:22 PM    Comment:  Pt and family participated in meeting with rehab team, recommend 24 hour supervision and would beneift from being in her own environment. discussed her safety awareness and the need for hands on assistance with some ADL tasks seocndary R UE and cognition    Nonacceptance, E, NR by AF at 8/13/2019  3:48 PM    Comment:  benefits and purpose of therapy   Family Acceptance, E, VU,NR by AF at 8/15/2019  3:22 PM    Comment:  Pt and family participated in meeting with rehab team, recommend 24 hour supervision and would beneift from being in her own environment. discussed her safety awareness and the need for hands on assistance with some ADL tasks seocndary R UE and cognition                   Point: Home exercise program (In Progress)     Description: Instruct learner(s) on appropriate technique for monitoring, assisting and/or progressing therapeutic exercises/activities.    Learning Progress Summary           Patient Nonacceptance, E,D, NR by DONI at 8/17/2019 11:44 AM    Acceptance, E, VU,NR by AF at 8/15/2019  3:22 PM    Comment:  Pt and family participated in meeting with rehab team, recommend 24 hour supervision and would beneift from being in her own environment. discussed her safety awareness and the need for hands on assistance with some ADL tasks  angiendary R UE and cognition   Family Acceptance, E, JEFFY,NR by AF at 8/15/2019  3:22 PM    Comment:  Pt and family participated in meeting with rehab team, recommend 24 hour supervision and would beneift from being in her own environment. discussed her safety awareness and the need for hands on assistance with some ADL tasks seocndary R UE and cognition                               User Key     Initials Effective Dates Name Provider Type Discipline     04/06/17 -  Steph Salinas, HESHAM Physical Therapist PT    AF 04/03/18 -  Ingris Ansari, OTR Occupational Therapist OT                       Time Calculation:     Time Calculation- OT     Row Name 08/19/19 1532 08/19/19 1531          Time Calculation- OT    OT Start Time  1430  -AF  0800  -AF     OT Stop Time  1500  -AF  0830  -AF     OT Time Calculation (min)  30 min  -AF  30 min  -AF       User Key  (r) = Recorded By, (t) = Taken By, (c) = Cosigned By    Initials Name Provider Type    AF Ingris Ansari, OTR Occupational Therapist          Therapy Charges for Today     Code Description Service Date Service Provider Modifiers Qty    21007238460 HC OT SELF CARE/MGMT/TRAIN EA 15 MIN 8/19/2019 Ingris Ansari OTR GO 2    06143021010 HC OT NEUROMUSC RE EDUCATION EA 15 MIN 8/19/2019 Ingris Ansari OTR GO 2                   JOHN Mejia  8/19/2019

## 2019-08-19 NOTE — PROGRESS NOTES
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 Related to UTI vs initiation of Paxil. On Cefdinir for GNR pending ID &Sensitivity.  Patient seen and speech therapy.  Continues with aphasia.  Does not appear as anxious presently but still gets frustrated with her aphasia.  Is matching words to pictures somewhat better.  History taken from: patient chart RN    Objective     Vital Signs  Temp:  [98 °F (36.7 °C)-98.2 °F (36.8 °C)] 98.2 °F (36.8 °C)  Heart Rate:  [68-76] 76  Resp:  [16-18] 16  BP: (136-176)/(63-74) 136/70    Objective  Physical Exam  MENTAL STATUS -  AWAKE / ALERT.  Anxious.  HEENT- NCAT,   SCLERA NON-ICTERIC, CONJUNCTIVA PINK, OP MOIST, NO JVD, EARS UNREMARKABLE EXTERNALLY  LUNGS - normal respirations.  Clear to auscultation  HEART- RRR   ABD -     EXT - NO EDEMA OR CYANOSIS  NEURO -alert.  Aphasia.  She will get occasional single word such as \"no\" but largely utterances.  Did not do any automatic phrase today.        MOTOR EXAM -takes resistance bilaterally.         Results Review:     I reviewed the patient's new clinical results.     CT HEAD - AUGUST 11, 2019  FINDINGS:  Old appearing lacunar type infarcts are again noted about the  right thalamus and basal ganglia regions. There are stable areas of  encephalomalacia in the left parietal and occipital lobes medially.  Generalized atrophy. There are moderately " extensive scattered areas of  decreased density in the white matter likely related to chronic ischemic  gliotic changes.    No hydrocephalus.    Glucose   Date/Time Value Ref Range Status   08/19/2019 0718 147 (H) 70 - 130 mg/dL Final   08/18/2019 2030 194 (H) 70 - 130 mg/dL Final   08/18/2019 1558 175 (H) 70 - 130 mg/dL Final   08/18/2019 1102 193 (H) 70 - 130 mg/dL Final   08/18/2019 0541 127 70 - 130 mg/dL Final   08/17/2019 2215 103 70 - 130 mg/dL Final   08/17/2019 2032 84 70 - 130 mg/dL Final   08/17/2019 1620 112 70 - 130 mg/dL Final     Results from last 7 days   Lab Units 08/19/19  0739 08/16/19  0710 08/14/19  0554   WBC 10*3/mm3 5.32 5.24 4.97   HEMOGLOBIN g/dL 10.3* 9.8* 9.0*   HEMATOCRIT % 33.7* 32.4* 28.9*   PLATELETS 10*3/mm3 382 275 258       Ref. Range 5/22/2019 05:44 5/22/2019 11:34 7/9/2019 08:25 7/18/2019 04:49 7/19/2019 05:05 7/23/2019 05:56 8/1/2019 06:48   Hemoglobin Latest Ref Range: 12.0 - 15.9 g/dL 10.8 (L)  10.6 (L) 8.5 (L) 9.7 (L) 9.3 (L) 7.4 (L)   Hematocrit Latest Ref Range: 34.0 - 46.6 % 35.1  33.4 (L) 26.2 (L) 29.9 (L) 28.6 (L) 23.6 (L)     Results from last 7 days   Lab Units 08/19/19  0739 08/16/19  0710 08/14/19  0554   SODIUM mmol/L 137 140 139   POTASSIUM mmol/L 3.8 3.8 3.7   CHLORIDE mmol/L 101 103 102   CO2 mmol/L 27.3 27.2 27.3   BUN mg/dL 12 17 19   CREATININE mg/dL 0.63 0.64 0.64   CALCIUM mg/dL 8.9 8.7 8.6   GLUCOSE mg/dL 168* 137* 152*         Ref. Range 8/1/2019 06:47   25 Hydroxy, Vitamin D Latest Ref Range: 30.0 - 100.0 ng/ml 21.8 (L)       Ref. Range 8/1/2019 06:47   Total Cholesterol Latest Ref Range: 0 - 200 mg/dL 105   HDL Cholesterol Latest Ref Range: 40 - 60 mg/dL 40   LDL Cholesterol  Latest Ref Range: 0 - 100 mg/dL 57   VLDL Cholesterol Latest Ref Range: 5 - 40 mg/dL 8   Triglycerides Latest Ref Range: 0 - 150 mg/dL 40   Medication Review: done  Scheduled Meds:    amLODIPine 5 mg Oral BID - RT   apixaban 5 mg Oral Q12H   aspirin 325 mg Oral Daily   atorvastatin  80 mg Oral Nightly   brimonidine 1 drop Both Eyes BID   cefdinir 300 mg Oral Q12H   dorzolamide 1 drop Both Eyes BID   glipiZIDE 5 mg Oral BID AC   hydrALAZINE 50 mg Oral Q8H   insulin lispro 0-7 Units Subcutaneous 4x Daily With Meals & Nightly   lansoprazole 30 mg Oral BID AC   losartan-HCTZ (HYZAAR) 100-12.5 combo dose  Oral Daily   memantine 5 mg Oral Daily   metFORMIN 1,000 mg Oral BID With Meals   nebivolol 20 mg Oral BID   nystatin 5 mL Swish & Spit 4x Daily   PARoxetine 10 mg Oral Daily   potassium chloride 20 mEq Oral Daily With Breakfast   vitamin D 50,000 Units Oral Q7 Days     Continuous Infusions:   PRN Meds:.•  acetaminophen  •  aluminum-magnesium hydroxide-simethicone  •  dextrose  •  dextrose  •  glucagon (human recombinant)  •  nitroglycerin      Assessment/Plan       Stroke (cerebrum) (CMS/[facility])      Assessment & Plan  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  If add Namenda, will probably discontinue amantadine as she continues alert.  August 11-she has some increased irritability at times.  This may be related to the underlying stroke deficits itself.  She does not appear to have any dysuria, no odor to her urine.  Staff reports that for the most part she is eating okay.  Will check a follow-up CT of the head to assess for any interval visual changes.  She is on aspirin and Eliquis for stroke prophylaxis.  She had noticeably improved alertness when amantadine was started.  She is readily alert now.  This may be related to natural recovery in terms of her level of arousal at this point.  Current plan is to discontinue the amantadine to see if that helps with her irritability and start  on Namenda for aphasia.  August 12 -  Patient with increased restlessness at times.   Continues aphasia. Not able to identify any  complaint.  Appears anxious today. No change on CT head yesterday. Started on Namenda for aphasia on 8/12/19.   August 13- will continue to monitor on recent med adjustments   August 15-She continues with expressive language deficits.    Again appears anxious related to her aphasia and difficulty expressing herself.    She will be able to get occasional single word for the examiner.  She does follow instructions.  The patient was reviewed with .  Discussed adding on an antidepressant with anxiolytic properties -Paxil-as it appears frustration from her aphasia with associated anxiety with the frustration affects her performance.  We will plan on starting Paxil 10 mg daily tomorrow and monitor response.  Reviewed with the patient's  that would not add a short acting anxiolytic (such as a benzodiazepine or Seroquel) as it may affect her cognitive performance.    August 16-started Paxil 10 mg daily  August 19 - Increased restlessness Friday night - ? Related to UTI vs initiation of Paxil. On Cefdinir for GNR pending ID &Sensitivity.    Aphasia -  August 12 - trial of Namenda  August 16-continue to observe on Namenda 5 mg daily for her aphasia and may possibly titrate up next week    Dysphagia-Cortrak feeding tube discontinued on July 31 and started on puréed/honey thick liquid diet with strategies  August 7-advance to fork mash nectar thick liquid diet, consistent carbohydrate.  August 14 - repeat VFSS to assess for advancing to thin liquids  August 15-Video fluoroscopic swallow study today-mechanical soft, no mixed consistency, nectar thick liquid, water protocol between meals, no straws.    History of multiple bilateral strokes and left central retinal artery occlusion    History of left greater than right internal carotid artery stenosis-status post left internal carotid artery stent July 17, 2019    History of hypertension -  Hyzaar 100-25. August 4 - Bystolic increased to 20 mg daily to 20 mg  bid.  August 8 - BP still runs high. On discharge in May 2019 was on Hyzaar 100-25 mg daily, Bystolic 20 mg daily, amlodipine 10 mg daily, Hydralazine 50 mg q 8 hours.  Will add Hydralazine initially 10 mg po q 8 hours and titrate up as needed.   August 9-systolic blood pressure elevated last night and early this morning but better this afternoon at 122-130.  Continue to follow recent addition of hydralazine and titrate up as needed  August 10-systolic blood pressure 175 this afternoon, range otherwise 122-142.  Will titrate up on hydralazine to 20 mg every 8 hours.  August 11-hypertension overall appears improved.  Will titrate up further to 25 mg every 8 hours.  August 12 - add amlodipine 5 mg daily.   August 14 - titrate up on hydralazine to 50 mg q 8 hours  August 15-blood pressure improved this afternoon with systolic blood pressure in the 120s-follow on current regimen  August 16-blood pressure range 110////59-will continue to monitor on present regimen  August 19 - /74 early AM, later 136/71 - increase amlodipine to 5 mg bid    History of diabetes mellitus -Lantus/glipizide (home medication metformin 1000 mg twice daily and glipizide 10 mg twice daily)  August 1-blood sugars were low yesterday afternoon after tube feeds discontinued.  Held glipizide last night and this morning and Lantus last night.  Blood sugar elevated at noontime today.  Will receive resume glipizide at a lower dose of 5 mg twice a day with meals.  Continue sliding scale insulin coverage.  She also is on metformin at home.  August 5-add back metformin initially at 500 mg twice a day and continue glipizide 5 mg twice a day, both one half of previous home dose, titrate up as needed.  August 7-titrate up metformin to 850 mg twice a day.  Add consistent carbohydrate restriction to diet.  August 9-increase metformin to 1000 g twice a day.  August 10-blood glucose 340 last evening but 150-114-178 so far today  August  11-continue to follow blood sugar pattern and may increase glipizide further as current dose glipizide 5 mg twice a day along with metformin 1000 mg twice a day  August 14 - blood glucose  past 24 hours - monitor pattern.  August 15-blood glucose [phone]--[phone]-continue to follow pattern.  Will change sliding scale insulin coverage to Humalog at a lower dose.  She was started on the regular insulin sliding scale when she was on tube feeds.  August 16 - recent blood glucose -477-138  August 19 - recent blood glucose [phone] - increase glipizide to 7.5 mg bid    Stroke prophylaxis-aspirin/Eliquis/atorvastatin    Anemia-August 1-hemoglobin 7.4.  Most recent check on July 23 HGB 9.3.  Will recheck hemoglobin this afternoon.  Hemoccult stool.  Iron studies.  Reticulocyte count.  Recheck CBC in the a.m.  Added Protonix.  Patient is on aspirin and Eliquis.  With recent stroke  will look to transfuse packed red blood cell.  August 2-hemoglobin improved 9.0-1 unit packed red blood cells.  Elevated reticulocyte count in response to anemia.  Nursing describes dark stool.  Patient discussed with gastroenterology.  At this point unable to do endoscopy as on Eliquis and aspirin.  Will treat symptomatically for now with increasing proton pump inhibitor and transfusing as needed.  August 5-anemia improved-hemoglobin 9.8  August 16-hemoglobin unchanged 9.8    DVT prophylaxis-SCDs/anticoagulation    Impulsivity-   Nursing to do re-orientation with the patient.  Room close to the nursing station.    Endocrine-vitamin D deficiency-ergocalciferol 50,000 units weekly x8 weeks added. Vitamin B12 level and TSH checked in late May unremarkable     Impaired cognition/impaired language, impaired swallow, impaired activity daily living, impaired mobility     Now admit for comprehensive acute inpatient rehabilitation .  This would be an interdisciplinary program with physical therapy 1 hour,  occupational therapy  1 hour, and speech therapy 1 hour, 5 days a week.  Rehabilitation nursing for carryover, monitoring of cardiopulmonary and neurologic   status, bowel and bladder, and skin  Ongoing physician follow-up.  Weekly team conferences.  Goals are indeterminant.   Rehabilitation prognosis determined.  Medical prognosis determined.  Estimated length of stay is indeterminate.    TEAM CONF - AUGUST 6- TRANFERS CTG. GAIT UP  FEET CTG-MIN ASSIST. UBD MIN. LBD MOD. BATH MOD. SEVERE APHASIA. INCREASED RESISTANCE TO PARTICIPATE AT TIMES.   PUREE/HTL.  GOOD PO INTAKE. ADJUSTING MEDS FOR DIABETES MELLITUS.  BLADDER CONTINENT/INCONTIENT.   ELOS- 2 WEEKS.     TEAM CONF - AUGUST 13 - DID GREAT WITH PT ON Saturday, FOLLOWING COMMANDS AND BATTING A BALL WITH ANOTHER PATIENT. YESTERDAY, VERY FRUSTRATED AND IRRITABLE.  FRUSTRATED BY APHASIA, TRYING TO TELL STAFF SOMETHING. WILL HOLD ON TO OBJECTS, REPEAT SINGLE WORD.   BED CTG. 4 STAIRS CTG.  GAIT 220 FEET CTG-SBA NO DEVICE.  TOILET TRANSFERS CTG-MIN.  GROOMING MIN.  UBD MIN ASSIST FOR BRA, LBD CTG-MIN. BATH CTG-MIN. COORDINATION RUE BETTER.  DYSPHAGIA - IMPROVED - FORK MASH, NTL. DO NOT FEEL SHE WOULD TOLERATE E-STIM. RECEPTIVE LANGUAGE IMPROVED SLIGHTLY , POINTING TO OBJECTS. EXPRESSIVELY PERSEVERATIVE ON A SINGLE WORD, TRIES TO USE PICTURE BOARD TO COMMUNICATE. YES/NO NOT ACCURATE.  CONTINENT BOWEL AND BLADDER, TIMED VOIDS. SKIN INTACT. TYPICALLY GOOD INTAKE.  ELOS - 1.5 WEEKS    AUGUST 14 - In therapies - In OT, increased participation noted with  present   Transfers SBA/CTG  Walked from therapy gym to room without AD SBA and vc's for directions back to the room   300' outdoors on sidewalk, incline; 300', 180', 100' up on rehab unit. Pt was able to find her room when she got close to it  Bathing, dressing, grooming min assist. Toileting moderate assist.  Pt participated in using R hand to manipualte round pegs into peg board by color with MIN difficutly once pt caught on to  the task. with 3D block recreation with R hand with 100% color match, 80% accuracy with block recreation  With SLP, patient was not able to effectively communicate her wants/needs but refused to go to therapy office by planting her heels while rolling in wc down the [place]; tx session completed in her room     August 15-The patient was reviewed with .  Discussed adding on an antidepressant with anxiolytic properties -Paxil-as it appears frustration from her aphasia with associated anxiety with the frustration affects her performance.  We will plan on starting Paxil 10 mg daily tomorrow and monitor response.  Reviewed with the patient's  that would not add a short acting anxiolytic (such as a benzodiazepine or Seroquel) as it may affect her cognitive performance.    August 16-Patient was reviewed with her daughter today including rehabilitation goals, discharge planning, and recent medication adjustment to try to help with her anxiety/frustration with her aphasia.  Reviewed with the daughter that on discussion with the team is felt that she would do better with her functional status/aphasia/anxiety if able to be discharged to home environment with more frequent interactions but if that is not feasible with the need to look at subacute options.     Barrier to discharge includes  Impaired cognitive-linguistic skills - work on receptive and expressive language and cognition.       Ajit Howard MD  08/19/19  9:57 AM    Time:

## 2019-08-19 NOTE — PROGRESS NOTES
Case Management  Inpatient Rehabilitation Plan of Care and Discharge Plan Note    Rehabilitation Diagnosis:  Branch  Date of Onset:  Branch    Medical Summary:  Branch  Past Medical History: Branch    Plan of Care  Updated Problems/Interventions  Field    Expected Intensity:  Branch  Interdisciplinary Team:  Branch  Estimated Length of Stay/Anticipated Discharge Date: Branch  Anticipated Discharge Destination:  Anticipated discharge destination from inpatient rehabilitation is community  discharge with assistance. Family conference held 8/15.  Referral has been made  to [facility] for subacute care.      Based on the patient's medical and functional status, their prognosis and  expected level of functional improvement is:  Branch    Signed by: ESAU Packer

## 2019-08-19 NOTE — THERAPY TREATMENT NOTE
Inpatient Rehabilitation - Speech Language Pathology Treatment Note    [facility]       Patient Name: Darby Workman  : 1945  MRN: [id number]    Today's Date: 2019           Admit Date: 2019      Visit Dx:      No diagnosis found.    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)   • Right-sided headache   • Acute ischemic stroke (CMS/HCC)   • Embolic stroke (CMS/HCC)   • Cerebral infarction due to stenosis of left carotid artery (CMS/HCC)   • Anticoagulated by anticoagulation treatment   • Stroke (cerebrum) (CMS/HCC)          Therapy Treatment    Evaluation/Coping    Evaluation/Treatment Time and Intent  Subjective Information: no complaints (19 1400 : Bruton, Katherine L)  Existing Precautions/Restrictions: fall(communication, swallow) (19 1400 : Bruton, Katherine L)  Document Type: therapy note (daily note) (19 1400 : Bruton, Katherine L)  Mode of Treatment: speech-language pathology (19 1400 : Bruton, Katherine L)  Patient/Family Observations: seated in wc; agreeable to therapy (19 1400 : Bruton, Katherine L)  Start Time (Evaluation/Treatment): 1400 (19 1400 : Bruton, Katherine L)  Stop Time (Evaluation/Treatment): 1430 (19 1400 : Bruton, Katherine L)    Vitals/Pain/Safety    Pain Scale: Numbers Pre/Post-Treatment  Pain Scale: Numbers, Pretreatment: 0/10 - no pain (19 1400 : Bruton, Katherine L)  Pain Scale: Numbers, Post-Treatment: 0/10 - no pain (19 1400 : Bruton, Katherine L)    Cognition/Communication         Oral Motor/Eating         Mobility/Basic Activities/Instrumental Activities/Motor/Modality                   ROM/MMT                   Sensory/Myotome/Dermatome/Edema               Posture/Balance/Special Tests/Exercise/Transportation/Sexual Function                    Orthotics/Residual Limb/Prosthetic Management              Outcome Summary         EDUCATION    The patient has been educated in the following areas:     Communication Impairment.    SLP Recommendation and Plan    SLP Diagnosis: severe global aphasia, cognitive impairment    SLP Diagnosis: severe global aphasia, cognitive impairment    Rehab Potential/Prognosis: good         Anticipated Dischage Disposition: home with assist, anticipate therapy at next level of care              Predicted Duration Therapy Intervention (Days): until discharge           Plan of Care Reviewed With: patient      SLP GOALS     Row Name 08/19/19 1400 08/19/19 1100          Words/Phrases/Sentences Goal 1 (SLP)    Progress (Ability to Contruct Words/Phrases/Sentences Goal 1, SLP)  with maximum cues (25-49%)  -KB  --     Progress/Outcomes (Identify Objects and Pictures Goal 1, SLP)  goal ongoing  -KB  --     Comment (Words/Phrases/Sentences Goal 1, SLP)  30% identifying named object in fo2, 60% with cues; 10% accuracy pointing to named body parts, 50% with direct model  -KB  --        Reading Comprehension of Basic Signs and Letters Goal 1 (SLP)    Progress (Reading Comprehension of Basic Signs and Letters Goal 1, SLP)  with minimal cues (75-90%)  -KB  independently (over 90% accuracy)  -KB     Progress/Outcomes (Reading Comprehension of Basic Signs and Letters Goal 1, SLP)  goal ongoing  -KB  good progress toward goal;goal ongoing  -KB     Comment (Reading Comprehension of Basic Signs and Letters Goal 1, SLP)  90% matching letters to like letter in fo4  -KB  100% matching symbol to like symbol in fo4 20% matching word to picture fo5, 70% mod cues  -KB        Word Retrieval Skills Goal 1 (SLP)    Progress (Word Retrieval Skills Goal 1, SLP)  with maximum cues (25-49%);with 1:1 supervision/constant cues  -KB  with maximum cues (25-49%)  -KB     Progress/Outcomes (Word Retrieval Goal 1, SLP)  progress slower than expected;goal ongoing   -KB  goal ongoing  -KB     Comment (Word Retrieval Goal 1, SLP)  10%, 10%, 30% counting 1-10 with direct model and visual cues  -KB  60% completing well-known rhymes/songs, 83% of responses were phonemic paraphasias characterized by addition of /f/ or/b/ at the beginning of the word  -KB        Graphic Expression of Words Goal 1 (SLP)    Progress (Graphic Expression of Single Words Goal 1, SLP)  with minimal cues (75-90%)  -KB  --     Progress/Outcomes (Graphic Expression of Single Words Goal 1, SLP)  goal ongoing  -KB  --     Comment (Graphic Expression of Single Words Goal 1, SLP)  81% accuracy copying single words; writing task paired with pictured items to increase comprehension of written words but patient paid minimal attention to pictures  -KB  --        Planning and Execution of Connected Speech Goal 1 (SLP)    Progress (Planning and Execution of Connected Speech Goal 1, SLP)  with moderate cues (50-74%)  -KB  --     Progress/Outcomes (Planning and Execution of Connected Speech Goal 1, SLP)  goal ongoing  -KB  --     Comment (Planning and Execution of Connected Speech Goal 1, SLP)  60% imitation of functional CV words; 80% with visual and verbal cues  -KB  --       User Key  (r) = Recorded By, (t) = Taken By, (c) = Cosigned By    Initials Name Provider Type    KB Bruton, Katherine L Speech and Language Pathologist                  Time Calculation:       Time Calculation- SLP     Row Name 08/19/19 1430 08/19/19 1130          Time Calculation- SLP    SLP Start Time  1400  -KB  1100  -KB     SLP Stop Time  1430  -KB  1130  -KB     SLP Time Calculation (min)  30 min  -KB  30 min  -KB       User Key  (r) = Recorded By, (t) = Taken By, (c) = Cosigned By    Initials Name Provider Type    KB Bruton, Katherine L Speech and Language Pathologist            Therapy Charges for Today     Code Description Service Date Service Provider Modifiers Qty    [id number] [facility] TREATMENT SPEECH 4 8/19/2019 Bruton, Katherine L GN  1                           Katherine L Bruton  8/19/2019

## 2019-08-19 NOTE — PROGRESS NOTES
Nutrition Services    Patient Name:  Darby Workman  YOB: 1945  MRN: [id number]  Admit Date:  7/31/2019    Nutrition follow-up:    Diet: DYS Mech soft, NTL; consistent carb  Supplement/Snack: Magic cup shakes TID  PO intake: % with periodic refusals (like today at lunch)  Labs: reviewed  Medications: reviewed  Skin status: intact (healing neck incision)     Intervention/Plan: No changes. Cont following.       Electronically signed by:  Renate Clifford RD  08/19/19 12:10 PM

## 2019-08-20 ENCOUNTER — TELEPHONE (OUTPATIENT)
Dept: NEUROSURGERY | Facility: CLINIC | Age: 74
End: 2019-08-20

## 2019-08-20 LAB
BACTERIA SPEC AEROBE CULT: ABNORMAL
GLUCOSE BLDC GLUCOMTR-MCNC: 108 MG/DL (ref 70–130)
GLUCOSE BLDC GLUCOMTR-MCNC: 123 MG/DL (ref 70–130)
GLUCOSE BLDC GLUCOMTR-MCNC: 139 MG/DL (ref 70–130)
GLUCOSE BLDC GLUCOMTR-MCNC: 148 MG/DL (ref 70–130)
GLUCOSE BLDC GLUCOMTR-MCNC: 65 MG/DL (ref 70–130)

## 2019-08-20 PROCEDURE — 97110 THERAPEUTIC EXERCISES: CPT | Performed by: PHYSICAL THERAPIST

## 2019-08-20 PROCEDURE — 25010000002 ERTAPENEM PER 500 MG: Performed by: PHYSICAL MEDICINE & REHABILITATION

## 2019-08-20 PROCEDURE — 97530 THERAPEUTIC ACTIVITIES: CPT

## 2019-08-20 PROCEDURE — 92507 TX SP LANG VOICE COMM INDIV: CPT

## 2019-08-20 PROCEDURE — 97112 NEUROMUSCULAR REEDUCATION: CPT

## 2019-08-20 PROCEDURE — 82962 GLUCOSE BLOOD TEST: CPT

## 2019-08-20 PROCEDURE — 97535 SELF CARE MNGMENT TRAINING: CPT

## 2019-08-20 RX ORDER — MEMANTINE HYDROCHLORIDE 5 MG/1
5 TABLET ORAL EVERY 12 HOURS SCHEDULED
Status: DISCONTINUED | OUTPATIENT
Start: 2019-08-20 | End: 2019-08-21

## 2019-08-20 RX ORDER — ERTAPENEM 1 G/1
1 INJECTION, POWDER, LYOPHILIZED, FOR SOLUTION INTRAMUSCULAR; INTRAVENOUS EVERY 24 HOURS
Status: DISCONTINUED | OUTPATIENT
Start: 2019-08-20 | End: 2019-08-20

## 2019-08-20 RX ADMIN — HYDRALAZINE HYDROCHLORIDE 50 MG: 50 TABLET, FILM COATED ORAL at 21:29

## 2019-08-20 RX ADMIN — DORZOLAMIDE HYDROCHLORIDE 1 DROP: 20 SOLUTION/ DROPS OPHTHALMIC at 21:29

## 2019-08-20 RX ADMIN — ASPIRIN 325 MG: 325 TABLET ORAL at 07:58

## 2019-08-20 RX ADMIN — ERTAPENEM SODIUM 1 G: 1 INJECTION, POWDER, LYOPHILIZED, FOR SOLUTION INTRAMUSCULAR; INTRAVENOUS at 15:56

## 2019-08-20 RX ADMIN — MEMANTINE HYDROCHLORIDE 5 MG: 5 TABLET, FILM COATED ORAL at 21:29

## 2019-08-20 RX ADMIN — METFORMIN HYDROCHLORIDE 1000 MG: 1000 TABLET ORAL at 07:59

## 2019-08-20 RX ADMIN — DORZOLAMIDE HYDROCHLORIDE 1 DROP: 20 SOLUTION/ DROPS OPHTHALMIC at 07:59

## 2019-08-20 RX ADMIN — METFORMIN HYDROCHLORIDE 1000 MG: 1000 TABLET ORAL at 17:43

## 2019-08-20 RX ADMIN — APIXABAN 5 MG: 5 TABLET, FILM COATED ORAL at 21:31

## 2019-08-20 RX ADMIN — NYSTATIN 500000 UNITS: 100000 SUSPENSION ORAL at 07:59

## 2019-08-20 RX ADMIN — LANSOPRAZOLE 30 MG: KIT at 06:02

## 2019-08-20 RX ADMIN — CEFDINIR 300 MG: 300 CAPSULE ORAL at 07:57

## 2019-08-20 RX ADMIN — NYSTATIN 500000 UNITS: 100000 SUSPENSION ORAL at 12:07

## 2019-08-20 RX ADMIN — DEXTROSE 15 G: 15 GEL ORAL at 20:59

## 2019-08-20 RX ADMIN — NYSTATIN 500000 UNITS: 100000 SUSPENSION ORAL at 21:29

## 2019-08-20 RX ADMIN — HYDRALAZINE HYDROCHLORIDE 50 MG: 50 TABLET, FILM COATED ORAL at 06:02

## 2019-08-20 RX ADMIN — GLIPIZIDE 7.5 MG: 5 TABLET ORAL at 17:43

## 2019-08-20 RX ADMIN — AMLODIPINE BESYLATE 5 MG: 5 TABLET ORAL at 21:29

## 2019-08-20 RX ADMIN — POTASSIUM CHLORIDE 20 MEQ: 1.5 POWDER, FOR SOLUTION ORAL at 07:45

## 2019-08-20 RX ADMIN — GLIPIZIDE 7.5 MG: 5 TABLET ORAL at 06:01

## 2019-08-20 RX ADMIN — ATORVASTATIN CALCIUM 80 MG: 80 TABLET, FILM COATED ORAL at 21:29

## 2019-08-20 RX ADMIN — BRIMONIDINE TARTRATE 1 DROP: 2 SOLUTION OPHTHALMIC at 07:59

## 2019-08-20 RX ADMIN — BRIMONIDINE TARTRATE 1 DROP: 2 SOLUTION OPHTHALMIC at 21:29

## 2019-08-20 RX ADMIN — NEBIVOLOL HYDROCHLORIDE 20 MG: 10 TABLET ORAL at 21:28

## 2019-08-20 RX ADMIN — MEMANTINE HYDROCHLORIDE 5 MG: 5 TABLET, FILM COATED ORAL at 07:57

## 2019-08-20 RX ADMIN — HYDRALAZINE HYDROCHLORIDE 50 MG: 50 TABLET, FILM COATED ORAL at 14:22

## 2019-08-20 RX ADMIN — NEBIVOLOL HYDROCHLORIDE 20 MG: 10 TABLET ORAL at 07:58

## 2019-08-20 RX ADMIN — PAROXETINE HYDROCHLORIDE 10 MG: 10 TABLET, FILM COATED ORAL at 07:57

## 2019-08-20 RX ADMIN — NYSTATIN 500000 UNITS: 100000 SUSPENSION ORAL at 18:05

## 2019-08-20 RX ADMIN — LOSARTAN POTASSIUM: 50 TABLET, FILM COATED ORAL at 07:58

## 2019-08-20 RX ADMIN — LANSOPRAZOLE 30 MG: KIT at 17:43

## 2019-08-20 RX ADMIN — APIXABAN 5 MG: 5 TABLET, FILM COATED ORAL at 07:59

## 2019-08-20 RX ADMIN — AMLODIPINE BESYLATE 5 MG: 5 TABLET ORAL at 08:00

## 2019-08-20 NOTE — THERAPY TREATMENT NOTE
Inpatient Rehabilitation - Occupational Therapy Treatment Note    [facility]     Patient Name: Darby Workman  : 1945  MRN: [id number]    Today's Date: 2019                 Admit Date: 2019      Visit Dx:  No diagnosis found.    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)   • Right-sided headache   • Acute ischemic stroke (CMS/HCC)   • Embolic stroke (CMS/HCC)   • Cerebral infarction due to stenosis of left carotid artery (CMS/HCC)   • Anticoagulated by anticoagulation treatment   • Stroke (cerebrum) (CMS/HCC)         Therapy Treatment    IRF Treatment Summary     Row Name 19 1529 19 1100 19 1045       Evaluation/Treatment Time and Intent    Subjective Information  no complaints  -AF  no complaints  -KB  complains of being cold  -JK    Existing Precautions/Restrictions  fall  -AF  fall communication, swallow, contact  -KB  fall swallow; contact  -JK    Document Type  therapy note (daily note)  -AF  therapy note (daily note)  -KB  therapy note (daily note)  -JK    Mode of Treatment  occupational therapy  -AF  speech-language pathology  -KB  physical therapy  -JK    Patient/Family Observations  sitting up in w/c in dining room in AM and in PM supine in bed  -AF  seated in wc; agreeable to therapy  -KB  pt supine in bed  -JK    Start Time (Evaluation/Treatment)  --  1100  -KB  --    Stop Time (Evaluation/Treatment)  --  1130  -KB  --    Recorded by [AF] Ingris Ansari OTR [KB] Bruton, Katherine L [JK] Nicole Austin, PT    Row Name 19 0900             Evaluation/Treatment Time and Intent    Subjective Information  no complaints  -KB      Existing Precautions/Restrictions  fall communication, swallow  -KB      Document Type  therapy note (daily note)  -KB      Mode of Treatment  speech-language  pathology  -KB      Patient/Family Observations  assisted pt from bed to wc; agreeable to therapy  -KB      Start Time (Evaluation/Treatment)  0900  -KB      Stop Time (Evaluation/Treatment)  0930  -KB      Recorded by [KB] Bruton, Katherine L      Row Name 08/20/19 1529 08/20/19 1045          Cognition/Psychosocial- PT/OT    Affect/Mental Status (Cognitive)  confused  -AF  confused  -JK     Behavioral Issues (Cognitive)  difficulty managing stress;overwhelmed easily  -AF  overwhelmed easily;uncooperative  -JK     Orientation Status (Cognition)  unable/difficult to assess  -AF  unable/difficult to assess  -JK     Follows Commands (Cognition)  50-74% accuracy;follows one step commands;delayed response/completion;increased processing time needed;initiation impaired;repetition of directions required;verbal cues/prompting required  -AF  50-74% accuracy;follows one step commands;verbal cues/prompting required;repetition of directions required  -JK     Personal Safety Interventions  fall prevention program maintained;gait belt;nonskid shoes/slippers when out of bed  -AF  fall prevention program maintained;gait belt  -JK     Attention Deficit (Cognitive)  moderate deficit  -AF  --     Memory Deficit (Cognitive)  unable/difficult to assess  -AF  --     Safety Deficit (Cognitive)  insight into deficits/self awareness;awareness of need for assistance  -AF  awareness of need for assistance;insight into deficits/self awareness  -JK     Recorded by [AF] Ingris Ansari, JOHN [JK] Nicole Austin PT     Row Name 08/20/19 1529 08/20/19 1045          Bed Mobility Assessment/Treatment    Supine-Sit [place] (Bed Mobility)  supervision  -AF  supervision  -JK     Sit-Supine [place] (Bed Mobility)  --  supervision  -JK     Assistive Device (Bed Mobility)  --  bed rails;head of bed elevated  -JK     Recorded by [AF] Ingris Ansari OTR [JK] Nicole Austin PT     Row Name 08/20/19 1529             Functional Mobility     Functional Mobility- Comment  walked in the room and hallway with hand held to SBA   -AF      Recorded by [AF] Ingris Ansari OTR      Row Name 08/20/19 1529 08/20/19 1045          Sit-Stand Transfer    Sit-Stand [place] (Transfers)  contact guard  -AF  contact guard;nonverbal cues (demo/gesture)  -JK     Assistive Device (Sit-Stand Transfers)  --  other (see comments) no AD  -JK     Recorded by [AF] Ingris Ansari, JOHN [JK] Nicole Austin, PT     Row Name 08/20/19 1529 08/20/19 1045          Stand-Sit Transfer    Stand-Sit [place] (Transfers)  contact guard  -AF  contact guard;verbal cues  -JK     Assistive Device (Stand-Sit Transfers)  --  other (see comments) no AD  -JK     Recorded by [AF] Ingris Ansari, JOHN [JK] Nicole Austin, PT     Row Name 08/20/19 1529             Toilet Transfer    Type (Toilet Transfer)  stand-sit;sit-stand  -AF      [place] Level (Toilet Transfer)  verbal cues;contact guard  -AF      Assistive Device (Toilet Transfer)  commode;grab bars/safety frame  -AF      Recorded by [AF] Ingris Ansari OTR      Row Name 08/20/19 1529             Shower Transfer    Type (Shower Transfer)  stand pivot/stand step  -AF      [place] Level (Shower Transfer)  verbal cues;contact guard  -AF      Assistive Device (Shower Transfer)  grab bars/tub rail;wheelchair;shower chair  -AF      Recorded by [AF] Ingris Ansari OTR      Row Name 08/20/19 1045             Gait/Stairs Assessment/Training    [place] Level (Gait)  contact guard  -JK      Assistive Device (Gait)  other (see comments) no AD  -JK      Distance in Feet (Gait)  150'  -JK      Pattern (Gait)  step-through  -JK      Deviations/Abnormal Patterns (Gait)  gait speed decreased;stride length decreased  -JK      Bilateral Gait Deviations  heel strike decreased;forward flexed posture  -JK      Comment (Gait/Stairs)  pt shivering; firmly refused to walk to PT gym and sunroom  -JK      Recorded by [JK] Norman  Nicole GALDAMEZ, PT      Row Name 08/20/19 1045             Safety Issues, Functional Mobility    Safety Issues Affecting Function (Mobility)  ability to follow commands;insight into deficits/self awareness;problem solving;safety precaution awareness  -JK      Impairments Affecting Function (Mobility)  balance;cognition;endurance/activity tolerance;pain;shortness of breath;strength  -JK      Recorded by [JK] Nicole Austin, PT      Row Name 08/20/19 1529             Bathing Assessment/Treatment    Bathing [place] Level  bathing skills;contact guard assist;minimum assist (75% patient effort);verbal cues  -AF      Assistive Device (Bathing)  grab bar/tub rail;hand held shower spray hose;shower chair  -AF      Bathing Position  supported sitting;supported standing  -AF      Recorded by [AF] Ingris Ansari, OTR      Row Name 08/20/19 1529             Upper Body Dressing Assessment/Treatment    Upper Body Dressing Task  upper body dressing skills;minimum assist (75% or more patient effort)  -AF      Upper Body Dressing Position  supported sitting  -AF      Recorded by [AF] Ingris Ansari, OTR      Row Name 08/20/19 1529             Lower Body Dressing Assessment/Treatment    Lower Body Dressing [place] Level  doff;don;pants/bottoms;shoes/slippers;socks;minimum assist (75% patient effort);contact guard assist;verbal cues  -AF      Lower Body Dressing Position  supported sitting;supported standing  -AF      Comment (Lower Body Dressing)  don shoes seated on EOB in PM session  -AF      Recorded by [AF] Ingris Ansari, OTR      Row Name 08/20/19 1529             Grooming Assessment/Treatment    Grooming [place] Level  grooming skills;moderate assist (50% patient effort);verbal cues  -AF      Grooming Position  sink side;supported sitting  -AF      Comment (Grooming)  stood to wash hands in PM session  -AF      Recorded by [AF] Ingris Ansari, OTR      Row Name 08/20/19 1529             Toileting  Assessment/Treatment    Toileting [place] Level  minimum assist (75% patient effort);verbal cues  -AF      Assistive Device Use (Toileting)  grab bar/safety frame;raised toilet seat  -AF      Toileting Position  unsupported sitting;supported standing  -AF      Recorded by [AF] Ingris Ansari OTR      Row Name 08/20/19 1529 08/20/19 1100 08/20/19 1045       Pain Scale: Numbers Pre/Post-Treatment    Pain Scale: Numbers, Pretreatment  0/10 - no pain  -AF  0/10 - no pain  -KB  0/10 - no pain  -JK    Pain Scale: Numbers, Post-Treatment  0/10 - no pain  -AF  0/10 - no pain  -KB  0/10 - no pain  -JK    Recorded by [AF] Ingris Ansari OTR [KB] Bruton, Katherine L [JK] Nicole Austin, PT    Row Name 08/20/19 0900             Pain Scale: Numbers Pre/Post-Treatment    Pain Scale: Numbers, Pretreatment  0/10 - no pain  -KB      Pain Scale: Numbers, Post-Treatment  0/10 - no pain  -KB      Recorded by [KB] Bruton, Katherine L      Row Name 08/20/19 1529             Static Sitting Balance    Level of [place] (Unsupported Sitting, Static Balance)  supervision  -AF      Recorded by [AF] Ingris Ansari OTR      Row Name 08/20/19 1529             Dynamic Sitting Balance    Level of [place], Reaches Outside Midline (Sitting, Dynamic Balance)  supervision  -AF      Recorded by [AF] Ingris Ansari OTR      Row Name 08/20/19 1529             Static Standing Balance    Level of [place] (Supported Standing, Static Balance)  contact guard assist  -AF      Recorded by [AF] Ingris Ansari OTR      Row Name 08/20/19 1045             Standing Balance Activity    Activities Performed (Standing, Balance Training)  standing ball toss balloon volleyball; bat balloon off wall  -JK      Support Needed for Balance (Standing, Balance Training)  CGA  -JK      Recorded by [JK] Nicole Austin, PT      Row Name 08/20/19 1529             Neuromuscular Re-education    Comment (Neuromuscular Re-education)   participated in one game of connect four with other patient, assisted therapist with 2 other games  -AF      Recorded by [AF] Ingris Ansari OTR      Row Name 08/20/19 1529 08/20/19 1045          Positioning and Restraints    Pre-Treatment Position  in bed  -AF  in bed  -JK     Post Treatment Position  bed  -AF  bed  -JK     In Bed  supine;notified nsg;call light within reach;encouraged to call for assist;exit alarm on in AM and PM   -AF  supine;notified nsg;exit alarm on  -JK     Recorded by [AF] Ingris Ansari, OTR [JK] Nicole Austin, PT       User Key  (r) = Recorded By, (t) = Taken By, (c) = Cosigned By    Initials Name Effective Dates    AF Ingris Ansari OTR 04/03/18 -     Nicole Ayon PT 04/03/18 -     KB Bruton Jenna L 03/07/18 -           Wound 07/17/19 1015 Left neck incision (Active)   Dressing Appearance open to air 8/20/2019  8:00 AM   Closure Liquid skin adhesive;Approximated 8/20/2019  8:00 AM   Base dry;clean 8/20/2019  8:00 AM   Drainage Amount none 8/19/2019  9:20 PM   Dressing Care, Wound open to air 8/19/2019  9:20 PM         OT Recommendation and Plan    Anticipated Equipment Needs At Discharge (OT Eval): bathing equipment  Planned Therapy Interventions (OT Eval): activity tolerance training, BADL retraining, cognitive/visual perception retraining, functional balance retraining, neuromuscular control/coordination retraining, occupation/activity based interventions, patient/caregiver education/training, ROM/therapeutic exercise, strengthening exercise, transfer/mobility retraining                Occupational Therapy Education     Title: PT OT SLP Therapies (Not Started)     Topic: Occupational Therapy (In Progress)     Point: ADL training (In Progress)     Description: Instruct learner(s) on proper safety adaptation and remediation techniques during self care or transfers.   Instruct in proper use of assistive devices.    Learning Progress Summary           Patient  Acceptance, E, NR,NL by WN at 8/19/2019 11:08 PM    Nonacceptance, E,D, NR by JS at 8/17/2019 11:44 AM    Acceptance, E, VU,NR by AF at 8/15/2019  3:22 PM    Comment:  Pt and family participated in meeting with rehab team, recommend 24 hour supervision and would beneift from being in her own environment. discussed her safety awareness and the need for hands on assistance with some ADL tasks seocndary R UE and cognition    Nonacceptance, E, NR by AF at 8/13/2019  3:48 PM    Comment:  benefits and purpose of therapy   Family Acceptance, E, VU,NR by AF at 8/15/2019  3:22 PM    Comment:  Pt and family participated in meeting with rehab team, recommend 24 hour supervision and would beneift from being in her own environment. discussed her safety awareness and the need for hands on assistance with some ADL tasks seocndary R UE and cognition                   Point: Home exercise program (In Progress)     Description: Instruct learner(s) on appropriate technique for monitoring, assisting and/or progressing therapeutic exercises/activities.    Learning Progress Summary           Patient Acceptance, E, NR,NL by WN at 8/19/2019 11:08 PM    Nonacceptance, E,D, NR by JS at 8/17/2019 11:44 AM    Acceptance, E, VU,NR by AF at 8/15/2019  3:22 PM    Comment:  Pt and family participated in meeting with rehab team, recommend 24 hour supervision and would beneift from being in her own environment. discussed her safety awareness and the need for hands on assistance with some ADL tasks seocndary R UE and cognition   Family Acceptance, E, VU,NR by AF at 8/15/2019  3:22 PM    Comment:  Pt and family participated in meeting with rehab team, recommend 24 hour supervision and would beneift from being in her own environment. discussed her safety awareness and the need for hands on assistance with some ADL tasks seocndary R UE and cognition                               User Key     Initials Effective Dates Name Provider Type Discipline    DONI  04/06/17 -  Steph Salinas, PT Physical Therapist PT    AF 04/03/18 -  Ingris Ansari, OTR Occupational Therapist OT    WN 06/24/19 -  Luis Abernathy, RN Registered Nurse Nurse                       Time Calculation:     Time Calculation- OT     Row Name 08/20/19 1538 08/20/19 1537          Time Calculation- OT    OT Start Time  1430  -AF  0800  -AF     OT Stop Time  1500  -AF  0830  -AF     OT Time Calculation (min)  30 min  -AF  30 min  -AF       User Key  (r) = Recorded By, (t) = Taken By, (c) = Cosigned By    Initials Name Provider Type    AF Ingris Ansari, OTR Occupational Therapist          Therapy Charges for Today     Code Description Service Date Service Provider Modifiers Qty    58254413224 HC OT SELF CARE/MGMT/TRAIN EA 15 MIN 8/19/2019 Ingris Ansari, OTR GO 2    04392759928 HC OT NEUROMUSC RE EDUCATION EA 15 MIN 8/19/2019 Ingris Ansari, OTR GO 2    78521256329 HC OT SELF CARE/MGMT/TRAIN EA 15 MIN 8/20/2019 Ingris Ansari, OTR GO 2    39365738356 HC OT NEUROMUSC RE EDUCATION EA 15 MIN 8/20/2019 Ingris Ansari, OTR GO 1    09277278007 HC OT THERAPEUTIC ACT EA 15 MIN 8/20/2019 Ingris Ansari, OTR GO 1                   Ingris Ansari OTKEVIN  8/20/2019

## 2019-08-20 NOTE — PLAN OF CARE
Problem: Skin Injury Risk (Adult)  Goal: Skin Health and Integrity  Outcome: Ongoing (interventions implemented as appropriate)      Problem: Fall Risk (Adult)  Goal: Absence of Fall  Outcome: Ongoing (interventions implemented as appropriate)      Problem: Patient Care Overview  Goal: Plan of Care Review  Outcome: Ongoing (interventions implemented as appropriate)    Goal: Individualization and Mutuality  Outcome: Ongoing (interventions implemented as appropriate)    Goal: Discharge Needs Assessment  Outcome: Ongoing (interventions implemented as appropriate)    Goal: Home Safety Plan  Outcome: Ongoing (interventions implemented as appropriate)    Goal: Coping Plan  Outcome: Ongoing (interventions implemented as appropriate)    Goal: Community Reintegration Plan  Outcome: Ongoing (interventions implemented as appropriate)      Problem: Stroke (IRF) (Adult)  Goal: Promote Optimal Functional [place]  Outcome: Ongoing (interventions implemented as appropriate)

## 2019-08-20 NOTE — PROGRESS NOTES
Pt has been accepted by [facility] and pre-cert has been initiated by the facility. Discussed with pt's  by phone and he agrees with plan to transfer to [facility].  Will continue to follow and assist with plans.

## 2019-08-20 NOTE — PROGRESS NOTES
Case Management  Inpatient Rehabilitation Team Conference    Conference Date/Time: 8/20/2019 7:47:25 AM    Team Conference Attendees:  Dr. Ajit Hurtado, Pharmacist  Sheryl Lacy, MSSW  Dorita Zacarias, PT  Ingris Ansari, OT  Katherine Bruton, SLP  Renate Clifford RD, LD  Star Capps, RN  Magaly Hudson, RN  Dwain Costa, Chaplain Char Carvajal, CTRS    Demographics            Age: 74Y            Gender: Female    Admission Date: 7/31/2019 3:45:00 PM  Rehabilitation Diagnosis:  CVA right neftali  Past Medical History: Vision loss; carotid stenosis, CAD, MI, blood clots, HLD,  HTN; OA; nocturia; CVA x2 5/19; DM      Plan of Care  Anticipated Discharge Date/Estimated Length of Stay: [facility]: DC 8/23  Anticipated Discharge Destination: Community discharge with assistance  Discharge Plan : Family conference held 8/15.  Referral has been made to  [facility] for subacute care.  Medical Necessity Expected Level Rationale: Estimated level of assist after  discharge: MIN/CGA  Rehab prognosis: indeterminate  Medical prognosis: indeterminate  Intensity and Duration: an average of 3 hours/5 days per week  Medical Supervision and 24 Hour Rehab Nursing: x  Physical Therapy: x  PT Intensity/Duration: 60 minutes/day, 5 days/week for 28 days  Occupational Therapy: x  OT Intensity/Duration: 60 minutes/day, 5 days/week for 28 days  Speech and Language Therapy: x  SLP Intensity/Duration: 60 minutes/day, 5 days/week for 28 days  Social Work: x  Therapeutic Recreation: x  Psychology: x  Updated (if changes indicated)    Anticipated Discharge Date/Estimated Length of Stay:   ELOS: Pending SNU    Based on the patient's medical and functional status, their prognosis and  expected level of functional improvement is: Estimated level of assist after  discharge: MIN/CGA  Rehab prognosis: indeterminate  Medical prognosis: indeterminate      Interdisciplinary Problem/Goals/Status    All Rehab Problems:  Body Systems    [RN]  Integumentary(Active)  Current Status(08/15/2019): Incision L neck glued  Weekly Goal(08/22/2019): No S&S infection noted  Discharge Goal: No S&S infection noted Skin is intact.        Communication    [ST] Comprehension(Active)  Current Status(08/19/2019): mod-severely impaired; improved ability to point to  named objects and body parts, still cannot follow verbal commands consistently  or answer yes/no questions reliably  Weekly Goal(08/26/2019): follow 1-step commands with a model  Discharge Goal: improved comprehension    [ST] Expression(Active)  Current Status(08/19/2019): severely impaired; single words, perseverations,  neologisms; slight improvement in direct imitation of short words, attempts to  use picture communication but this is minimally reliable  Weekly Goal(08/26/2019): imitate names of objects for ADL tasks  Discharge Goal: functional expression for basic wants/needs        Mobility    [OT] Toilet Transfers(Active)  Current Status(08/19/2019): CGA/SBA  Weekly Goal(08/20/2019): CGA/SBA  Discharge Goal: CGA/SBA    [OT] Tub/Shower Transfers(Active)  Current Status(08/19/2019): CGA  Weekly Goal(08/20/2019): CGA  Discharge Goal: CGA    [PT] Bed/Chair/Wheelchair(Active)  Current Status(08/19/2019): CGA/Min  Weekly Goal(08/23/2019): CGA  Discharge Goal: CGA    [PT] Walk(Active)  Current Status(08/19/2019): 220, CGA  Weekly Goal(08/23/2019): to and from BR, CGA  Discharge Goal: 250` CGA    [PT] Stairs(Active)  Current Status(08/19/2019): 4 steps CGA  Weekly Goal(08/23/2019): PT only  Discharge Goal: 4 steps CGA        Psychosocial    [RN] Coping/Adjustment(Active)  Current Status(08/15/2019): Pt. is non-verbal; Supportive   Weekly Goal(08/22/2019): Identify progress in functional status  Discharge Goal: Demonstrate healthy coping strategies        Safety    [RN] Potential for Injury(Active)  Current Status(08/15/2019): Impulsive; aphasic; does not use call light  Weekly Goal(08/22/2019): Cue to use  call light  Discharge Goal: Pt/family will be aware of risk of fall and safety in the home  setting        Self Care    [OT] Bathing(Active)  Current Status(08/19/2019): MIN/CGA  Weekly Goal(08/20/2019): MIN/CGA  Discharge Goal: MIN/CGA    [OT] Dressing (Lower)(Active)  Current Status(08/19/2019): CGA/MIN  Weekly Goal(08/20/2019): CGA/MIN  Discharge Goal: CGA/MIN    [OT] Dressing (Upper)(Active)  Current Status(08/19/2019): MIN with bra fastening  Weekly Goal(08/20/2019): MIN  Discharge Goal: MIN    [OT] Grooming(Active)  Current Status(08/19/2019): MIN  Weekly Goal(08/20/2019): MIN  Discharge Goal: MIN    [OT] Toileting(Active)  Current Status(08/19/2019): MOD  Weekly Goal(08/20/2019): MOD  Discharge Goal: MOD        Sphincter Control    [RN] Bladder Management(Active)  Current Status(08/15/2019): incontinent bladder 50%  Weekly Goal(08/22/2019): continent 50%  Discharge Goal: continent 100%    [RN] Bowel Management(Active)  Current Status(08/15/2019): incontinent bowel 100%  Weekly Goal(08/22/2019): continent bowel 50%  Discharge Goal: continent bowel 100%        Swallow Function    [ST] Swallowing(Active)  Current Status(08/19/2019): VFSS 8/15: mech. soft, no mixed, ground meat, NTL;  medications whole as tolerated with puree/NTL; water/ice between meals after  oral care  Weekly Goal(08/26/2019): tolerate current diet without s/s pen/asp  Discharge Goal: tolerate least restrictive diet without s/s pen/asp        Comments: 8/6: walker confuses patient so PT not using one at this time;  decreased coordination right hand; some agitation at times; some unsafe  behaviors;    8/13: increased frustration/irritation; balance improved; increased coordination  in right hand; unlikely to tolerate e-stim; impulsive;    8/20: now with sitter, plan to discontinue; plan discharge to subacute; balance  improved; performance with activities inconsistent;    Signed by: Star Capps RN    Physician CoSigned By: Ajit Howard  08/20/2019 07:54:42

## 2019-08-20 NOTE — PROGRESS NOTES
Inpatient Rehabilitation Functional Measures Assessment    Functional Measures  KATI Eating:  [facility] Grooming: [facility] Bathing:  [facility] Upper Body Dressing:  [facility] Lower Body Dressing:  [facility] Toileting:  [facility] Bladder Management  Level of Assistance:  [place]  Frequency/Number of Accidents this Shift:  [facility] Bowel Management  Level of Assistance: [place]  Frequency/Number of Accidents this Shift: [facility] Bed/Chair/Wheelchair Transfer:  [facility] Toilet Transfer:  [facility] Tub/Shower Transfer:  [place]    Previously Documented Mode of Locomotion at Discharge: Field  KATI Expected Mode of Locomotion at Discharge: [facility] Walk/Wheelchair:  [facility] Stairs:  [facility] Comprehension:  Both ( auditory and visual) modes of comprehension are used  equally. Patient does not comprehend complex/abstract information in their  primary language without assistance from a helper. Comprehension Score = 3,  Moderate Prompting. Patient comprehends basic daily needs or ideas 50-74% of the  time. Patient requires moderate/some prompting. Patient requires the following  assistive device(s): Glasses.  KATI Expression:  Non-vocal expression is the usual mode. Patient does not  express complex/abstract information in their primary language without a helper.  Expression Score = 1, Total Assistance. Patient expresses basic daily needs less  than 25% of the time, or does not express basic needs appropriately or  consistently despite prompting. No assistive devices were required.  KATI Social Interaction:  Social Interaction Score = 6, Modified Independent.  Patient is modified independent for social interaction, requiring: Requires  additional time.  KATI Problem Solving:  Activity was not observed.  KATI Memory:  Activity was not observed.    Therapy Mode Minutes  Occupational Therapy: [place]  Physical Therapy: [place]  Speech Language Pathology:  [place]    Signed by: Hina Hudson  RN

## 2019-08-20 NOTE — THERAPY TREATMENT NOTE
Inpatient Rehabilitation - Speech Language Pathology Treatment Note    [facility]       Patient Name: Darby Workman  : 1945  MRN: [id number]    Today's Date: 2019           Admit Date: 2019      Visit Dx:      No diagnosis found.    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)   • Right-sided headache   • Acute ischemic stroke (CMS/HCC)   • Embolic stroke (CMS/HCC)   • Cerebral infarction due to stenosis of left carotid artery (CMS/HCC)   • Anticoagulated by anticoagulation treatment   • Stroke (cerebrum) (CMS/HCC)          Therapy Treatment    Evaluation/Coping    Evaluation/Treatment Time and Intent  Subjective Information: no complaints (19 1100 : Bruton, Katherine L)  Existing Precautions/Restrictions: fall(communication, swallow, contact) (19 1100 : Bruton, Katherine L)  Document Type: therapy note (daily note) (19 1100 : Bruton, Katherine L)  Mode of Treatment: speech-language pathology (19 1100 : Bruton, Katherine L)  Patient/Family Observations: seated in wc; agreeable to therapy (19 1100 : Bruton, Katherine L)  Start Time (Evaluation/Treatment): 1100 (19 1100 : Bruton, Katherine L)  Stop Time (Evaluation/Treatment): 1130 (19 1100 : Bruton, Katherine L)    Vitals/Pain/Safety    Pain Scale: Numbers Pre/Post-Treatment  Pain Scale: Numbers, Pretreatment: 0/10 - no pain (19 1100 : Bruton, Katherine L)  Pain Scale: Numbers, Post-Treatment: 0/10 - no pain (19 1100 : Bruton, Katherine L)    EDUCATION    The patient has been educated in the following areas:     Communication Impairment.    SLP GOALS     Row Name 19 1100 19 0900 19 1400       Words/Phrases/Sentences Goal 1 (SLP)    Progress (Ability to Contruct Words/Phrases/Sentences Goal 1, SLP)   with moderate cues (50-74%)  -KB  independently (over 90% accuracy)  -KB  with maximum cues (25-49%)  -KB    Progress/Outcomes (Identify Objects and Pictures Goal 1, SLP)  good progress toward goal;goal ongoing  -KB  good progress toward goal;goal ongoing  -KB  goal ongoing  -KB    Comment (Words/Phrases/Sentences Goal 1, SLP)  60% identifying body parts by name, 80% with model  -KB  96% identifying pictured objects in fo2 by name  -KB  30% identifying named object in fo2, 60% with cues; 10% accuracy pointing to named body parts, 50% with direct model  -KB       Comprehend Questions Goal 1 (SLP)    Progress/Outcomes (Comprehend Questions Goal 1, SLP)  goal ongoing  -KB  --  --    Comment (Comprehend Questions Goal 1, SLP)  answered basic yes/no questions appropriately x3 in a row then did not respond to next 2 presented questions  -KB  --  --       Reading Comprehension of Basic Signs and Letters Goal 1 (SLP)    Progress (Reading Comprehension of Basic Signs and Letters Goal 1, SLP)  --  --  with minimal cues (75-90%)  -KB    Progress/Outcomes (Reading Comprehension of Basic Signs and Letters Goal 1, SLP)  --  --  goal ongoing  -KB    Comment (Reading Comprehension of Basic Signs and Letters Goal 1, SLP)  --  --  90% matching letters to like letter in fo4  -KB       Reading Comprehension at Word Level Goal 1 (SLP)    Improve Reading Comprehension at Word Level Goal 1 (SLP)  --  match object to word;matching picture to word;matching written word to dictated word;90%;independently (over 90% accuracy)  -KB  --    Time Frame (Reading Comprehension at Word Level Goal 1, SLP)  --  by discharge  -KB  --    Progress (Reading Comprehension at Word Level Goal 1, SLP)  with minimal cues (75-90%)  -KB  with moderate cues (50-74%)  -KB  --    Progress/Outcomes (Reading Comprehension at Word Level Goal 1, SLP)  good progress toward goal;goal ongoing  -KB  good progress toward goal;goal ongoing  -KB  --    Comment (Reading  Comprehension at Word Level Goal 1, SLP)  80% match written word to pictured object in fo2  -KB  74% matching written word in fo2 to dictated word  -KB  --       Word Retrieval Skills Goal 1 (SLP)    Progress (Word Retrieval Skills Goal 1, SLP)  with maximum cues (25-49%)  -KB  with maximum cues (25-49%)  -KB  with maximum cues (25-49%);with 1:1 supervision/constant cues  -KB    Progress/Outcomes (Word Retrieval Goal 1, SLP)  goal ongoing  -KB  goal ongoing  -KB  progress slower than expected;goal ongoing  -KB    Comment (Word Retrieval Goal 1, SLP)  attempted to elicit automatic speech by singing common short songs in unision- patient was able to imitate melodies accurately but produce lyrics accurately in 10% or less of utterances across 3 songs, 2 attempts per song  -KB  0%, 60%, 40% counting 1-10 with visual cues and verbal model across 3 trials respectively  -KB  10%, 10%, 30% counting 1-10 with direct model and visual cues  -KB       Graphic Expression of Words Goal 1 (SLP)    Progress (Graphic Expression of Single Words Goal 1, SLP)  --  with minimal cues (75-90%)  -KB  with minimal cues (75-90%)  -KB    Progress/Outcomes (Graphic Expression of Single Words Goal 1, SLP)  --  good progress toward goal;goal ongoing  -KB  goal ongoing  -KB    Comment (Graphic Expression of Single Words Goal 1, SLP)  --  wrote her name accurately given written prompt and written first letter  -KB  81% accuracy copying single words; writing task paired with pictured items to increase comprehension of written words but patient paid minimal attention to pictures  -KB       Planning and Execution of Connected Speech Goal 1 (SLP)    Progress (Planning and Execution of Connected Speech Goal 1, SLP)  --  with moderate cues (50-74%)  -KB  with moderate cues (50-74%)  -KB    Progress/Outcomes (Planning and Execution of Connected Speech Goal 1, SLP)  --  goal ongoing  -KB  goal ongoing  -KB    Comment (Planning and Execution of Connected  Speech Goal 1, SLP)  --  70% imitation of functional CV words, 90% wiht additional cues, 10% approximations  -KB  60% imitation of functional CV words; 80% with visual and verbal cues  -KB       Augmentative/Alternative Communication Objectives Goal 1 (SLP    Progress (Augmentative/Alternative Communication Goal 1, SLP)  with moderate cues (50-74%)  -KB  --  --    Progress/Outcomes (Augmentative/Alternative Communication Goal 1, SLP)  goal ongoing  -KB  --  --    Comment (Augmentative/Alternative Communication Goal 1, SLP)  patient was able to identify 3/6 named items on basic picture communication page, 5/6 with cues  -KB  --  --    Row Name 08/19/19 1100             Reading Comprehension of Basic Signs and Letters Goal 1 (SLP)    Progress (Reading Comprehension of Basic Signs and Letters Goal 1, SLP)  independently (over 90% accuracy)  -KB      Progress/Outcomes (Reading Comprehension of Basic Signs and Letters Goal 1, SLP)  good progress toward goal;goal ongoing  -KB      Comment (Reading Comprehension of Basic Signs and Letters Goal 1, SLP)  100% matching symbol to like symbol in fo4 20% matching word to picture fo5, 70% mod cues  -KB         Word Retrieval Skills Goal 1 (SLP)    Progress (Word Retrieval Skills Goal 1, SLP)  with maximum cues (25-49%)  -KB      Progress/Outcomes (Word Retrieval Goal 1, SLP)  goal ongoing  -KB      Comment (Word Retrieval Goal 1, SLP)  60% completing well-known rhymes/songs, 83% of responses were phonemic paraphasias characterized by addition of /f/ or/b/ at the beginning of the word  -KB        User Key  (r) = Recorded By, (t) = Taken By, (c) = Cosigned By    Initials Name Provider Type    KB Bruton, Katherine L Speech and Language Pathologist        Time Calculation:   Time Calculation- SLP     Row Name 08/20/19 1130 08/20/19 0931          Time Calculation- SLP    SLP Start Time  1100  -KB  0900  -KB     SLP Stop Time  1130  -KB  0930  -KB     SLP Time Calculation (min)  30 min   -KB  30 min  -PIETER     SLP Non-Billable Time (min)  --  10 min rounds  -KB       User Key  (r) = Recorded By, (t) = Taken By, (c) = Cosigned By    Initials Name Provider Type    KB Bruton, Katherine L Speech and Language Pathologist        Therapy Charges for Today     Code Description Service Date Service Provider Modifiers Qty    52381363632 HC ST TREATMENT SPEECH 4 8/19/2019 Bruton, Katherine L GN 1    41720752567 HC ST TREATMENT SPEECH 4 8/20/2019 Bruton, Katherine L GN 1        Katherine L Bruton  8/20/2019

## 2019-08-20 NOTE — PLAN OF CARE
Problem: Skin Injury Risk (Adult)  Goal: Skin Health and Integrity  Outcome: Ongoing (interventions implemented as appropriate)      Problem: Fall Risk (Adult)  Goal: Absence of Fall  Outcome: Ongoing (interventions implemented as appropriate)      Problem: Patient Care Overview  Goal: Plan of Care Review  Outcome: Ongoing (interventions implemented as appropriate)   08/20/19 1549   Patient Care Overview   IRF Plan of Care Review progress ongoing, continue   Progress, Functional Goals demonstrating adequate progress   Coping/Psychosocial   Plan of Care Reviewed With patient   OTHER   Outcome Summary Mrs Workman remains nonverbal, but has been alert, calm. her sitter was discontinued and she has had no unsafe behavior. She ambulates CGA of 1 without device. Meds crushed in AS. Urine cx ESBL and IVAB started today for 3 days.       Problem: Stroke (IRF) (Adult)  Goal: Promote Optimal Functional [place]  Outcome: Ongoing (interventions implemented as appropriate)

## 2019-08-20 NOTE — TELEPHONE ENCOUNTER
Magaly @  [facility] called, Pt may be discharged later this week to an extended care facility (they do not have all the information yet).  When do you want her seen in the office?  Surgery with [facility] for Endarterectomy & C-angio 7/17/19    Magaly - [phone]

## 2019-08-20 NOTE — PAYOR COMM NOTE
"Good afternoon!    AUTH # [id number]    Included is the patient's most recent team conference report from today, 8/20. Anticipate discharge to subacute facility once the patient is accepted by one and precert obtained. Please review for authorization of continued stay through transfer to SNU.     Thank you!    Star Capps, RN  [phone]    Noman Workman (74 y.o. Female)     Date of Birth Social Security Number Address Home Phone MRN    1945  [address] [id number] [phone] [id number]    Hindu Marital Status          Amish        Admission Date Admission Type Admitting Provider Attending Provider Department, Room/Bed    7/31/19 Elective Daniel Howard MD Gormley, John Michael, MD [facility]3/1    Discharge Date Discharge Disposition Discharge Destination                       Attending Provider:  Daniel Howard MD    Allergies:  No Known Allergies    Isolation:  Contact   Infection:  ESBL E coli (08/20/19)   Code Status:  CPR    Ht:  165.1 cm (65\")   Wt:  60.9 kg (134 lb 3.2 oz)    Admission Cmt:  None   Principal Problem:  None                Active Insurance as of 7/31/2019     Primary Coverage     Payor Plan Insurance Group Employer/Plan Group    [facility] [id number]     Payor Plan Address Payor Plan Phone Number Payor Plan Fax Number Effective Dates    [address]   4/1/2018 - None Entered    [facility] [phone]       Subscriber Name Subscriber Birth Date Member ID       DANIEL WORKMAN 2/6/1946 [id number]           Secondary Coverage     Payor Plan Insurance Group Employer/Plan Group    MEDICARE MEDICARE A ONLY      Payor Plan Address Payor Plan Phone Number Payor Plan Fax Number Effective Dates    [address] [phone]  10/1/2010 - None Entered    [facility] [id number]       Subscriber Name Subscriber Birth Date Member ID       NOMAN WORKMAN 1945 [id number]           "       Emergency Contacts      (Rel.) Home Phone Work Phone Mobile Phone    eder mcdonald (Daughter) [phone]    Ajit Mcdonald (Spouse) [phone] -- --

## 2019-08-20 NOTE — PROGRESS NOTES
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  She is not able to describe symptoms due to the aphasia.  On questioning appears to indicate she is not having any headache.  She could not give a clear description on if she is having any burning with urination.  She did have the increase restlessness over the weekend which was felt possibly related to urinary tract infection for which the urinalysis was ordered.   Her daughter who is with her presently feels that she is doing fairly well today.  Patient appears less anxious.    History taken from: patient chart RN    Objective     Vital Signs  Temp:  [97.9 °F (36.6 °C)-98.5 °F (36.9 °C)] 98.5 °F (36.9 °C)  Heart Rate:  [68-77] 68  Resp:  [16-18] 18  BP: (109-179)/(58-77) 126/58    Objective  Physical Exam  MENTAL STATUS -  AWAKE / ALERT.     HEENT- NCAT,   SCLERA NON-ICTERIC, CONJUNCTIVA PINK, OP MOIST, NO JVD, EARS UNREMARKABLE EXTERNALLY  LUNGS - normal respirations.  Clear to auscultation  HEART- RRR   ABD -     EXT - NO EDEMA OR CYANOSIS  NEURO -alert.  Aphasia.  She will repeat single words such as apple or baseball..   Not able to string any phrase together.  She  follows instructions.        MOTOR EXAM -takes resistance bilaterally.         Results Review:     I reviewed the patient's new clinical results.     CT HEAD - AUGUST 11, 2019  FINDINGS:  Old appearing lacunar type infarcts  are again noted about the  right thalamus and basal ganglia regions. There are stable areas of  encephalomalacia in the left parietal and occipital lobes medially.  Generalized atrophy. There are moderately extensive scattered areas of  decreased density in the white matter likely related to chronic ischemic  gliotic changes.    No hydrocephalus.    Glucose   Date/Time Value Ref Range Status   08/20/2019 0523 108 70 - 130 mg/dL Final   08/19/2019 2050 102 70 - 130 mg/dL Final   08/19/2019 1617 107 70 - 130 mg/dL Final   08/19/2019 1107 192 (H) 70 - 130 mg/dL Final   08/19/2019 0718 147 (H) 70 - 130 mg/dL Final   08/18/2019 2030 194 (H) 70 - 130 mg/dL Final   08/18/2019 1558 175 (H) 70 - 130 mg/dL Final   08/18/2019 1102 193 (H) 70 - 130 mg/dL Final     Results from last 7 days   Lab Units 08/19/19  0739 08/16/19  0710 08/14/19  0554   WBC 10*3/mm3 5.32 5.24 4.97   HEMOGLOBIN g/dL 10.3* 9.8* 9.0*   HEMATOCRIT % 33.7* 32.4* 28.9*   PLATELETS 10*3/mm3 382 275 258       Ref. Range 5/22/2019 05:44 5/22/2019 11:34 7/9/2019 08:25 7/18/2019 04:49 7/19/2019 05:05 7/23/2019 05:56 8/1/2019 06:48   Hemoglobin Latest Ref Range: 12.0 - 15.9 g/dL 10.8 (L)  10.6 (L) 8.5 (L) 9.7 (L) 9.3 (L) 7.4 (L)   Hematocrit Latest Ref Range: 34.0 - 46.6 % 35.1  33.4 (L) 26.2 (L) 29.9 (L) 28.6 (L) 23.6 (L)     Results from last 7 days   Lab Units 08/19/19  0739 08/16/19  0710 08/14/19  0554   SODIUM mmol/L 137 140 139   POTASSIUM mmol/L 3.8 3.8 3.7   CHLORIDE mmol/L 101 103 102   CO2 mmol/L 27.3 27.2 27.3   BUN mg/dL 12 17 19   CREATININE mg/dL 0.63 0.64 0.64   CALCIUM mg/dL 8.9 8.7 8.6   GLUCOSE mg/dL 168* 137* 152*         Ref. Range 8/1/2019 06:47   25 Hydroxy, Vitamin D Latest Ref Range: 30.0 - 100.0 ng/ml 21.8 (L)       Ref. Range 8/1/2019 06:47   Total Cholesterol Latest Ref Range: 0 - 200 mg/dL 105   HDL Cholesterol Latest Ref Range: 40 - 60 mg/dL 40   LDL Cholesterol  Latest Ref Range: 0 - 100 mg/dL 57   VLDL Cholesterol Latest Ref Range:  5 - 40 mg/dL 8   Triglycerides Latest Ref Range: 0 - 150 mg/dL 40   Medication Review: done  Scheduled Meds:    amLODIPine 5 mg Oral BID - RT   apixaban 5 mg Oral Q12H   aspirin 325 mg Oral Daily   atorvastatin 80 mg Oral Nightly   brimonidine 1 drop Both Eyes BID   cefdinir 300 mg Oral Q12H   dorzolamide 1 drop Both Eyes BID   glipiZIDE 7.5 mg Oral BID AC   hydrALAZINE 50 mg Oral Q8H   insulin lispro 0-7 Units Subcutaneous 4x Daily With Meals & Nightly   lansoprazole 30 mg Oral BID AC   losartan-HCTZ (HYZAAR) 100-12.5 combo dose  Oral Daily   memantine 5 mg Oral Daily   metFORMIN 1,000 mg Oral BID With Meals   nebivolol 20 mg Oral BID   nystatin 5 mL Swish & Spit 4x Daily   PARoxetine 10 mg Oral Daily   potassium chloride 20 mEq Oral Daily With Breakfast   vitamin D 50,000 Units Oral Q7 Days     Continuous Infusions:   PRN Meds:.•  acetaminophen  •  aluminum-magnesium hydroxide-simethicone  •  dextrose  •  dextrose  •  glucagon (human recombinant)  •  nitroglycerin      Assessment/Plan       Stroke (cerebrum) (CMS/[facility])      Assessment & Plan  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  If add Namenda, will probably discontinue amantadine as she continues alert.  August 11-she has some increased irritability at times.  This may be related to the underlying stroke deficits itself.  She does not appear to have any dysuria, no odor to her urine.  Staff reports that for the most part she is eating okay.  Will check a follow-up CT of the head to assess for any interval visual changes.  She is on aspirin and Eliquis for stroke prophylaxis.  She had noticeably improved alertness when amantadine was started.  She is readily alert now.  This may be related to natural recovery in terms of her level of arousal at this point.  Current plan is to  discontinue the amantadine to see if that helps with her irritability and start  on Namenda for aphasia.  August 12 -  Patient with increased restlessness at times.   Continues aphasia. Not able to identify any complaint.  Appears anxious today. No change on CT head yesterday. Started on Namenda for aphasia on 8/12/19.   August 13- will continue to monitor on recent med adjustments   August 15-She continues with expressive language deficits.    Again appears anxious related to her aphasia and difficulty expressing herself.    She will be able to get occasional single word for the examiner.  She does follow instructions.  The patient was reviewed with .  Discussed adding on an antidepressant with anxiolytic properties -Paxil-as it appears frustration from her aphasia with associated anxiety with the frustration affects her performance.  We will plan on starting Paxil 10 mg daily tomorrow and monitor response.  Reviewed with the patient's  that would not add a short acting anxiolytic (such as a benzodiazepine or Seroquel) as it may affect her cognitive performance.    August 16-started Paxil 10 mg daily  August 19 - Increased restlessness Friday night - ? Related to UTI vs initiation of Paxil. On Cefdinir for GNR pending ID &Sensitivity.  August 20 -urine culture with E. coli ESBL.  Given her aphasia not able to obtain information regarding dysuria.  As the urinalysis was ordered as part of work-up for increased restlessness this weekend, would have to treat as symptomatic although could be asymptomatic bacteriuria.  Will place on ertapenem 1 g IV daily for 3-5 days.    Aphasia -  August 12 - trial of Namenda  August 16-continue to observe on Namenda 5 mg daily for her aphasia and may possibly titrate up next week  August 20-titrate up on Namenda to 5 mg twice a day    Dysphagia-Cortrak feeding tube discontinued on July 31 and started on puréed/honey thick liquid diet with strategies  August 7-advance to  fork mash nectar thick liquid diet, consistent carbohydrate.  August 14 - repeat VFSS to assess for advancing to thin liquids  August 15-Video fluoroscopic swallow study today-mechanical soft, no mixed consistency, nectar thick liquid, water protocol between meals, no straws.    History of multiple bilateral strokes and left central retinal artery occlusion    History of left greater than right internal carotid artery stenosis-status post left internal carotid artery stent July 17, 2019    History of hypertension -  Hyzaar 100-25. August 4 - Bystolic increased to 20 mg daily to 20 mg bid.  August 8 - BP still runs high. On discharge in May 2019 was on Hyzaar 100-25 mg daily, Bystolic 20 mg daily, amlodipine 10 mg daily, Hydralazine 50 mg q 8 hours.  Will add Hydralazine initially 10 mg po q 8 hours and titrate up as needed.   August 9-systolic blood pressure elevated last night and early this morning but better this afternoon at 122-130.  Continue to follow recent addition of hydralazine and titrate up as needed  August 10-systolic blood pressure 175 this afternoon, range otherwise 122-142.  Will titrate up on hydralazine to 20 mg every 8 hours.  August 11-hypertension overall appears improved.  Will titrate up further to 25 mg every 8 hours.  August 12 - add amlodipine 5 mg daily.   August 14 - titrate up on hydralazine to 50 mg q 8 hours  August 15-blood pressure improved this afternoon with systolic blood pressure in the 120s-follow on current regimen  August 16-blood pressure range 110////59-will continue to monitor on present regimen  August 19 - /74 early AM, later 136/71 - increase amlodipine to 5 mg bid    History of diabetes mellitus -Lantus/glipizide (home medication metformin 1000 mg twice daily and glipizide 10 mg twice daily)  August 1-blood sugars were low yesterday afternoon after tube feeds discontinued.  Held glipizide last night and this morning and Lantus last night.  Blood  sugar elevated at noontime today.  Will receive resume glipizide at a lower dose of 5 mg twice a day with meals.  Continue sliding scale insulin coverage.  She also is on metformin at home.  August 5-add back metformin initially at 500 mg twice a day and continue glipizide 5 mg twice a day, both one half of previous home dose, titrate up as needed.  August 7-titrate up metformin to 850 mg twice a day.  Add consistent carbohydrate restriction to diet.  August 9-increase metformin to 1000 g twice a day.  August 10-blood glucose 340 last evening but 150-114-178 so far today  August 11-continue to follow blood sugar pattern and may increase glipizide further as current dose glipizide 5 mg twice a day along with metformin 1000 mg twice a day  August 14 - blood glucose  past 24 hours - monitor pattern.  August 15-blood glucose 350-830-129--167-66-continue to follow pattern.  Will change sliding scale insulin coverage to Humalog at a lower dose.  She was started on the regular insulin sliding scale when she was on tube feeds.  August 16 - recent blood glucose -597-138  August 19 - recent blood glucose [phone] - increase glipizide to 7.5 mg bid    Stroke prophylaxis-aspirin/Eliquis/atorvastatin    Anemia-August 1-hemoglobin 7.4.  Most recent check on July 23 HGB 9.3.  Will recheck hemoglobin this afternoon.  Hemoccult stool.  Iron studies.  Reticulocyte count.  Recheck CBC in the a.m.  Added Protonix.  Patient is on aspirin and Eliquis.  With recent stroke  will look to transfuse packed red blood cell.  August 2-hemoglobin improved 9.0-1 unit packed red blood cells.  Elevated reticulocyte count in response to anemia.  Nursing describes dark stool.  Patient discussed with gastroenterology.  At this point unable to do endoscopy as on Eliquis and aspirin.  Will treat symptomatically for now with increasing proton pump inhibitor and transfusing as needed.  August 5-anemia improved-hemoglobin  9.8  August 16-hemoglobin unchanged 9.8    DVT prophylaxis-SCDs/anticoagulation    Impulsivity-   Nursing to do re-orientation with the patient.  Room close to the nursing station.    Endocrine-vitamin D deficiency-ergocalciferol 50,000 units weekly x8 weeks added. Vitamin B12 level and TSH checked in late May unremarkable    Urinary tract infection-August 20 -urine culture with E. coli ESBL.  Given her aphasia not able to obtain information regarding dysuria.  As the urinalysis was ordered as part of work-up for increased restlessness this weekend, would have to treat as symptomatic although could be asymptomatic bacteriuria.  Will place on ertapenem 1 g IV daily for 3-5 days.     Impaired cognition/impaired language, impaired swallow, impaired activity daily living, impaired mobility     Now admit for comprehensive acute inpatient rehabilitation .  This would be an interdisciplinary program with physical therapy 1 hour,  occupational therapy 1 hour, and speech therapy 1 hour, 5 days a week.  Rehabilitation nursing for carryover, monitoring of cardiopulmonary and neurologic   status, bowel and bladder, and skin  Ongoing physician follow-up.  Weekly team conferences.  Goals are indeterminant.   Rehabilitation prognosis determined.  Medical prognosis determined.  Estimated length of stay is indeterminate.    TEAM CONF - AUGUST 6- TRANFERS CTG. GAIT UP  FEET CTG-MIN ASSIST. UBD MIN. LBD MOD. BATH MOD. SEVERE APHASIA. INCREASED RESISTANCE TO PARTICIPATE AT TIMES.   PUREE/HTL.  GOOD PO INTAKE. ADJUSTING MEDS FOR DIABETES MELLITUS.  BLADDER CONTINENT/INCONTIENT.   ELOS- 2 WEEKS.     TEAM CONF - AUGUST 13 - DID GREAT WITH PT ON Saturday, FOLLOWING COMMANDS AND BATTING A BALL WITH ANOTHER PATIENT. YESTERDAY, VERY FRUSTRATED AND IRRITABLE.  FRUSTRATED BY APHASIA, TRYING TO TELL STAFF SOMETHING. WILL HOLD ON TO OBJECTS, REPEAT SINGLE WORD.   BED CTG. 4 STAIRS CTG.  GAIT 220 FEET CTG-SBA NO DEVICE.  TOILET TRANSFERS  CTG-MIN.  GROOMING MIN.  UBD MIN ASSIST FOR BRA, LBD CTG-MIN. BATH CTG-MIN. COORDINATION RUE BETTER.  DYSPHAGIA - IMPROVED - LAI EDGAR NTL. DO NOT FEEL SHE WOULD TOLERATE E-STIM. RECEPTIVE LANGUAGE IMPROVED SLIGHTLY , POINTING TO OBJECTS. EXPRESSIVELY PERSEVERATIVE ON A SINGLE WORD, TRIES TO USE PICTURE BOARD TO COMMUNICATE. YES/NO NOT ACCURATE.  CONTINENT BOWEL AND BLADDER, TIMED VOIDS. SKIN INTACT. TYPICALLY GOOD INTAKE.  ELOS - 1.5 WEEKS    AUGUST 14 - In therapies - In OT, increased participation noted with  present   Transfers SBA/CTG  Walked from therapy gym to room without AD SBA and vc's for directions back to the room   300' outdoors on sidewalk, incline; 300', 180', 100' up on rehab unit. Pt was able to find her room when she got close to it  Bathing, dressing, grooming min assist. Toileting moderate assist.  Pt participated in using R hand to manipualte round pegs into peg board by color with MIN difficutly once pt caught on to the task. with 3D block recreation with R hand with 100% color match, 80% accuracy with block recreation  With SLP, patient was not able to effectively communicate her wants/needs but refused to go to therapy office by planting her heels while rolling in wc down the [place]; tx session completed in her room     August 15-The patient was reviewed with .  Discussed adding on an antidepressant with anxiolytic properties -Paxil-as it appears frustration from her aphasia with associated anxiety with the frustration affects her performance.  We will plan on starting Paxil 10 mg daily tomorrow and monitor response.  Reviewed with the patient's  that would not add a short acting anxiolytic (such as a benzodiazepine or Seroquel) as it may affect her cognitive performance.    August 16-Patient was reviewed with her daughter today including rehabilitation goals, discharge planning, and recent medication adjustment to try to help with her anxiety/frustration with her  aphasia.  Reviewed with the daughter that on discussion with the team is felt that she would do better with her functional status/aphasia/anxiety if able to be discharged to home environment with more frequent interactions but if that is not feasible with the need to look at subacute options.    TEAM CONF - AUGUST 20 - GAIT CTG- FEET. NO DEVICE, WALKS TO AND FROM GYM.  ADLS CTG WITH CUING.  PLAYED ENTIRE GAME OF CHECKERS YESTERDAY. VARIABLE PERFORMANCE WITH SPEECH THERAPY 20-90% MATCHING WORDS TO PICTURES.    MECH SOFT, GROUND MEAT, NECTAR THICK LIQUIDS.   TYPICALLY CONTINENT BLADDER BUT INCONTINENT BOWEL. DID FINE LAST EVENING PER SISTER AND DID NOT TRY TO GET UP- WILL DISCONTINUE SITTER. IN ROOM CLOSE TO NURSING STATION.   BEST- FAMILY CONF LAST WEEK- WILL WORK TOWARD SUBACUTE PLACEMENT      Barrier to discharge includes  Impaired cognitive-linguistic skills - work on receptive and expressive language and cognition.       Ajit Howard MD  08/20/19  7:49 AM    Time:

## 2019-08-20 NOTE — PROGRESS NOTES
Inpatient Rehabilitation Functional Measures Assessment    Functional Measures  KATI Eating:  [facility] Grooming: [facility] Bathing:  [facility] Upper Body Dressing:  [facility] Lower Body Dressing:  [facility] Toileting:  [facility] Bladder Management  Level of Assistance:  [place]  Frequency/Number of Accidents this Shift:  [facility] Bowel Management  Level of Assistance: [place]  Frequency/Number of Accidents this Shift: [facility] Bed/Chair/Wheelchair Transfer:  [facility] Toilet Transfer:  [facility] Tub/Shower Transfer:  [place]    Previously Documented Mode of Locomotion at Discharge: [place]  KATI Expected Mode of Locomotion at Discharge: [facility] Walk/Wheelchair:  [facility] Stairs:  [facility] Comprehension:  Auditory comprehension is the usual mode. Comprehension  Score = 6, Modified [place].  Patient comprehends complex/abstract  information in their primary language, requiring: Additional time.  KATI Expression:  Non-vocal expression is the usual mode. Expression Score = 6,  Modified Independent. Patient expresses complex/abstract information in their  primary language, requiring:  KATI Social Interaction:  Social Interaction Score = 6, Modified Independent.  Patient is modified independent for social interaction, requiring:  KATI Problem Solving:  Problem Solving Score = 6, Modified [place].  Patient  makes appropriate decisions in order to solve complex problems, but requires  extra time.  KATI Memory:  Memory Score = 6, Modified [place].  Patient is modified  independent for memory, requiring:    Therapy Mode Minutes  Occupational Therapy: [place]  Physical Therapy: [place]  Speech Language Pathology:  [place]    Signed by: Luis Abernathy RN

## 2019-08-20 NOTE — PROGRESS NOTES
Inpatient Rehabilitation Functional Measures Assessment    Functional Measures  KATI Eating:  [facility] Grooming: [place]  [facility] Bathing:  [place]  [facility] Upper Body Dressing:  [facility] Lower Body Dressing:  [facility] Toileting:  [facility] Bladder Management  Level of Assistance:  [place]  Frequency/Number of Accidents this Shift:  [facility] Bowel Management  Level of Assistance: [place]  Frequency/Number of Accidents this Shift: [facility] Bed/Chair/Wheelchair Transfer:  Bed/chair/wheelchair Transfer Score = 4.  Patient performs 75% or more of effort and minimal assistance (little/incidental  help/lifting of one limb/steadying) for transferring to and from the  bed/chair/wheelchair, requiring: Steadying. Contact guard. No assistive devices  were required.  KATI Toilet Transfer:  [facility] Tub/Shower Transfer:  [place]    Previously Documented Mode of Locomotion at Discharge: [place]  KATI Expected Mode of Locomotion at Discharge: [facility] Walk/Wheelchair:  WHEELCHAIR OBSERVATION   Activity was not observed.    WALK OBSERVATION   Walk Distance Scale = 3.  Distance walked is greater than 150 feet. Walk Score  = 4.  Patient performs 75% or more of effort and requires minimal assistance.  Incidental help/contact guard/steadying was provided. Patient walked a distance  of  200 feet. No assistive devices were required.  KATI Stairs:  Stairs did not occur.    KATI Comprehension:  [place]  KATI Expression:  [facility] Social Interaction:  [facility] Problem Solving:  [facility] Memory:  [place]    Therapy Mode Minutes  Occupational Therapy: [place]  Physical Therapy: Individual: 60 minutes.  Speech Language Pathology:  [place]    Signed by: Nicole Austin PT

## 2019-08-20 NOTE — PROGRESS NOTES
Inpatient Rehabilitation Plan of Care Note    Plan of Care  Care Plan Reviewed - Updates as Follows    Body Systems    [RN] Integumentary(Active)  Current Status(08/20/2019): Incision L neck glued. Skin is intact.  Weekly Goal(08/27/2019): No S&S infection noted  Discharge Goal: No S&S infection noted Skin is intact.    Performed Intervention(s)  Daily skin inspection      Psychosocial    [RN] Coping/Adjustment(Active)  Current Status(08/20/2019): Pt. is non-verbal; Supportive   Weekly Goal(08/27/2019): Identify progress in functional status  Discharge Goal: Demonstrate healthy coping strategies    Performed Intervention(s)  Assist patient to express needs and concerns  calm enviroment      Safety    [RN] Potential for Injury(Active)  Current Status(08/20/2019): Impulsive; aphasic; does not use call light  Weekly Goal(08/27/2019): Cue to use call light  Discharge Goal: Pt/family will be aware of risk of fall and safety in the home  setting    Performed Intervention(s)  Bed alarm, wc alarm  Safety rounds  Items within reach  24 hr sitter      Sphincter Control    [RN] Bladder Management(Active)  Current Status(08/15/2019): incontinent bladder 25%. Urine positive for ESBL  Weekly Goal(08/22/2019): continent 75%  Discharge Goal: continent 100%    Performed Intervention(s)  Monitor intake and output  Encourage fluid intake  Elimination schedule  contact isolation    Signed by: Hina Hudson RN

## 2019-08-21 LAB
ANION GAP SERPL CALCULATED.3IONS-SCNC: 10.9 MMOL/L (ref 5–15)
BASOPHILS # BLD AUTO: 0.05 10*3/MM3 (ref 0–0.2)
BASOPHILS NFR BLD AUTO: 1 % (ref 0–1.5)
BUN BLD-MCNC: 19 MG/DL (ref 8–23)
BUN/CREAT SERPL: 29.2 (ref 7–25)
CALCIUM SPEC-SCNC: 8.8 MG/DL (ref 8.6–10.5)
CHLORIDE SERPL-SCNC: 99 MMOL/L (ref 98–107)
CO2 SERPL-SCNC: 28.1 MMOL/L (ref 22–29)
CREAT BLD-MCNC: 0.65 MG/DL (ref 0.57–1)
DEPRECATED RDW RBC AUTO: 53.7 FL (ref 37–54)
EOSINOPHIL # BLD AUTO: 0.15 10*3/MM3 (ref 0–0.4)
EOSINOPHIL NFR BLD AUTO: 3.1 % (ref 0.3–6.2)
ERYTHROCYTE [DISTWIDTH] IN BLOOD BY AUTOMATED COUNT: 15.7 % (ref 12.3–15.4)
GFR SERPL CREATININE-BSD FRML MDRD: 89 ML/MIN/1.73
GLUCOSE BLD-MCNC: 117 MG/DL (ref 65–99)
GLUCOSE BLDC GLUCOMTR-MCNC: 149 MG/DL (ref 70–130)
GLUCOSE BLDC GLUCOMTR-MCNC: 153 MG/DL (ref 70–130)
HCT VFR BLD AUTO: 30.6 % (ref 34–46.6)
HGB BLD-MCNC: 9.8 G/DL (ref 12–15.9)
IMM GRANULOCYTES # BLD AUTO: 0.01 10*3/MM3 (ref 0–0.05)
IMM GRANULOCYTES NFR BLD AUTO: 0.2 % (ref 0–0.5)
LYMPHOCYTES # BLD AUTO: 0.77 10*3/MM3 (ref 0.7–3.1)
LYMPHOCYTES NFR BLD AUTO: 15.8 % (ref 19.6–45.3)
MCH RBC QN AUTO: 29.9 PG (ref 26.6–33)
MCHC RBC AUTO-ENTMCNC: 32 G/DL (ref 31.5–35.7)
MCV RBC AUTO: 93.3 FL (ref 79–97)
MONOCYTES # BLD AUTO: 0.39 10*3/MM3 (ref 0.1–0.9)
MONOCYTES NFR BLD AUTO: 8 % (ref 5–12)
NEUTROPHILS # BLD AUTO: 3.49 10*3/MM3 (ref 1.7–7)
NEUTROPHILS NFR BLD AUTO: 71.9 % (ref 42.7–76)
NRBC BLD AUTO-RTO: 0 /100 WBC (ref 0–0.2)
PLATELET # BLD AUTO: 324 10*3/MM3 (ref 140–450)
PMV BLD AUTO: 9.6 FL (ref 6–12)
POTASSIUM BLD-SCNC: 3.5 MMOL/L (ref 3.5–5.2)
RBC # BLD AUTO: 3.28 10*6/MM3 (ref 3.77–5.28)
SODIUM BLD-SCNC: 138 MMOL/L (ref 136–145)
WBC NRBC COR # BLD: 4.86 10*3/MM3 (ref 3.4–10.8)

## 2019-08-21 PROCEDURE — 97535 SELF CARE MNGMENT TRAINING: CPT

## 2019-08-21 PROCEDURE — 99024 POSTOP FOLLOW-UP VISIT: CPT | Performed by: NURSE PRACTITIONER

## 2019-08-21 PROCEDURE — 82962 GLUCOSE BLOOD TEST: CPT

## 2019-08-21 PROCEDURE — 25010000002 ERTAPENEM PER 500 MG: Performed by: PHYSICAL MEDICINE & REHABILITATION

## 2019-08-21 PROCEDURE — 85025 COMPLETE CBC W/AUTO DIFF WBC: CPT | Performed by: PHYSICAL MEDICINE & REHABILITATION

## 2019-08-21 PROCEDURE — 80048 BASIC METABOLIC PNL TOTAL CA: CPT | Performed by: PHYSICAL MEDICINE & REHABILITATION

## 2019-08-21 PROCEDURE — 92507 TX SP LANG VOICE COMM INDIV: CPT

## 2019-08-21 RX ORDER — GLIPIZIDE 5 MG/1
5 TABLET ORAL
Status: DISCONTINUED | OUTPATIENT
Start: 2019-08-21 | End: 2019-08-29 | Stop reason: HOSPADM

## 2019-08-21 RX ADMIN — ATORVASTATIN CALCIUM 80 MG: 80 TABLET, FILM COATED ORAL at 21:24

## 2019-08-21 RX ADMIN — APIXABAN 5 MG: 5 TABLET, FILM COATED ORAL at 19:18

## 2019-08-21 RX ADMIN — LANSOPRAZOLE 30 MG: KIT at 05:27

## 2019-08-21 RX ADMIN — HYDRALAZINE HYDROCHLORIDE 50 MG: 50 TABLET, FILM COATED ORAL at 05:27

## 2019-08-21 RX ADMIN — NEBIVOLOL HYDROCHLORIDE 20 MG: 10 TABLET ORAL at 21:24

## 2019-08-21 RX ADMIN — ERTAPENEM SODIUM 1 G: 1 INJECTION, POWDER, LYOPHILIZED, FOR SOLUTION INTRAMUSCULAR; INTRAVENOUS at 18:04

## 2019-08-21 RX ADMIN — AMLODIPINE BESYLATE 5 MG: 5 TABLET ORAL at 21:25

## 2019-08-21 RX ADMIN — NYSTATIN 500000 UNITS: 100000 SUSPENSION ORAL at 21:24

## 2019-08-21 RX ADMIN — DORZOLAMIDE HYDROCHLORIDE 1 DROP: 20 SOLUTION/ DROPS OPHTHALMIC at 21:24

## 2019-08-21 RX ADMIN — BRIMONIDINE TARTRATE 1 DROP: 2 SOLUTION OPHTHALMIC at 21:24

## 2019-08-21 RX ADMIN — HYDRALAZINE HYDROCHLORIDE 50 MG: 50 TABLET, FILM COATED ORAL at 21:25

## 2019-08-21 NOTE — PROGRESS NOTES
[facility] PROGRESS NOTE    PATIENT IDENTIFICATION:   Name:  Darby Workman      MRN:  [id number]     74 y.o.  female               CC: POD 7/17 left CEA and left carotid stent placement      Subjective     Interval History: Patient uncooperative and refusing all medications, therapies or conversation today per nursing staff. Plan is for CHAR tomorrow    Per Dr. Howard's note today TEAM CONF - AUGUST 20 - GAIT CTG- FEET. NO DEVICE, WALKS TO AND FROM GYM.  ADLS CTG WITH CUING.  PLAYED ENTIRE GAME OF [employer] YESTERDAY. VARIABLE PERFORMANCE WITH SPEECH THERAPY 20-90% MATCHING WORDS TO PICTURES.    MECH SOFT, GROUND MEAT, NECTAR THICK LIQUIDS.   TYPICALLY CONTINENT BLADDER BUT INCONTINENT BOWEL.    ROS:  UTO- wont answer    Objective     Vital signs in last 24 hours:  Temp:  [97.5 °F (36.4 °C)-97.8 °F (36.6 °C)] 97.5 °F (36.4 °C)  Heart Rate:  [69-72] 69  Resp:  [16-18] 16  BP: (118-130)/(64-70) 130/70    Intake/Output this shift:  No intake/output data recorded.      Intake/Output last 3 shifts:  I/O last 3 completed shifts:  In: 790 [P.O.:690; IV Piggyback:100]  Out: -     LABS:  None today     IMAGING STUDIES:  No new imaging    I personally viewed and interpreted the patient's chart    Meds reviewed/changed: Yes    Current Facility-Administered Medications:   •  acetaminophen (TYLENOL) tablet 1,000 mg, 1,000 mg, Oral, Q6H PRN, Mary Patel MD, 1,000 mg at 08/19/19 0351  •  aluminum-magnesium hydroxide-simethicone (MAALOX MAX) 400-400-40 MG/5ML suspension 15 mL, 15 mL, Oral, Q6H PRN, Viktor Lopez MD, 15 mL at 08/04/19 1745  •  amLODIPine (NORVASC) tablet 5 mg, 5 mg, Oral, BID - RT, Ajit Howard MD, 5 mg at 08/20/19 2129  •  apixaban (ELIQUIS) tablet 5 mg, 5 mg, Oral, Q12H, Ajit Howard MD, 5 mg at 08/20/19 2131  •  aspirin tablet 325 mg, 325 mg, Oral, Daily, Ajit Howard MD, 325 mg at 08/20/19 1240  •  atorvastatin (LIPITOR) tablet  80 mg, 80 mg, Oral, Nightly, Ajit Howard MD, 80 mg at 08/20/19 2129  •  brimonidine (ALPHAGAN) 0.2 % ophthalmic solution 1 drop, 1 drop, Both Eyes, BID, Ajit Howard MD, 1 drop at 08/20/19 2129  •  dextrose (D50W) 25 g/ 50mL Intravenous Solution 25 g, 25 g, Intravenous, Q15 Min PRN, Ajit Howard MD  •  dextrose (GLUTOSE) oral gel 15 g, 15 g, Oral, Q15 Min PRN, Ajit Howard MD, 15 g at 08/20/19 2059  •  dorzolamide (TRUSOPT) 2 % ophthalmic solution 1 drop, 1 drop, Both Eyes, BID, Ajit Howard MD, 1 drop at 08/20/19 2129  •  ertapenem (INVanz) 1 g/100 mL 0.9% NS VTB (mbp), 1 g, Intravenous, Q24H, Ajit Howard MD, 1 g at 08/20/19 1556  •  glipiZIDE (GLUCOTROL) tablet 5 mg, 5 mg, Oral, BID AC, Ajit Howard MD  •  glucagon (human recombinant) (GLUCAGEN DIAGNOSTIC) injection 1 mg, 1 mg, Subcutaneous, PRN, Ajit Howard MD  •  hydrALAZINE (APRESOLINE) tablet 50 mg, 50 mg, Oral, Q8H, Ajit Howard MD, 50 mg at 08/21/19 0527  •  insulin lispro (humaLOG) injection 0-7 Units, 0-7 Units, Subcutaneous, 4x Daily With Meals & Nightly, Ajit Howard MD, 2 Units at 08/19/19 1152  •  lansoprazole (FIRST) oral suspension 30 mg, 30 mg, Oral, BID AC, Ajit Howard MD, 30 mg at 08/21/19 0527  •  losartan (COZAAR) 100 mg, hydrochlorothiazide (MICROZIDE) 12.5 mg for HYZAAR 100-12.5, , Oral, Daily, Ajit Howard MD  •  memantine (NAMENDA) tablet 5 mg, 5 mg, Oral, Q12H, Ajit Howard MD, 5 mg at 08/20/19 2129  •  metFORMIN (GLUCOPHAGE) tablet 1,000 mg, 1,000 mg, Oral, BID With Meals, Ajit Howard MD, 1,000 mg at 08/20/19 1743  •  nebivolol (BYSTOLIC) tablet 20 mg, 20 mg, Oral, BID, Viktor Lopez MD, 20 mg at 08/20/19 2128  •  nitroglycerin (NITROSTAT) SL tablet 0.4 mg, 0.4 mg, Sublingual, Q5 Min PRN, Ajit Howard MD  •  nystatin (MYCOSTATIN) 423205 UNIT/ML suspension 500,000 Units, 5 mL,  Swish & Spit, 4x Daily, Ajit Howard MD, 500,000 Units at 08/20/19 2129  •  PARoxetine (PAXIL) tablet 10 mg, 10 mg, Oral, Daily, Ajit Howard MD, 10 mg at 08/20/19 0757  •  potassium chloride (KLOR-CON) packet 20 mEq, 20 mEq, Oral, Daily With Breakfast, Ajit Howard MD, 20 mEq at 08/20/19 0745  •  vitamin D (ERGOCALCIFEROL) capsule 50,000 Units, 50,000 Units, Oral, Q7 Days, Ajit Howard MD, 50,000 Units at 08/15/19 1341      Physical Exam:    General:   Eyes open, alert, tracking.  Smiled     intermittently, but would only respond with     the word no until I asked her multiple     questions in a quick fashion and she did say     yes once.   Neck:    Left CEA incision well healed  CN III IV VI: EOMI-tracks movement in room  CN VII: Right facial weakness- mild      Motor: Some movement right side, but will not follow commands or cooperate with exam.  She is able to raise the left leg off the bed and tries to kick me       Assessment/Plan     ASSESSMENT:      Stroke (cerebrum) (CMS/[facility])      PLAN: Alert, but uncooperative today.  Perseverates on the word no.  Apparently did fairly well recently in her therapies.  They are making some changes to her medication with regard to Namenda and amantadine.  Unsure if her behavior today represents something to do with medications or just general frustration.  She is supposed to be going to subacute rehab tomorrow.  Have ordered a carotid Doppler study to be completed to check her surgical area.  Hopefully she will cooperate for this exam prior to her discharge.     8/23 Addendum: Patient was more cooperative yesterday per telephone discussion with nursing staff.  She completed carotid Doppler study.  Right ICA stenosis moderate is stable and left side with no evidence of restenosis.  Continue antiplatelet medication for stent.  She will need follow-up in the office in 2 months.  Dr. Harris to speak with family later today.    I discussed  "the patients findings and my recommendations with patient and nursing staff       LOS: 21 days       Greta Swan, APRN  8/21/2019  2:57 PM    \"Dictated utilizing Dragon dictation\".      "

## 2019-08-21 NOTE — PROGRESS NOTES
Occupational Therapy: Individual: 30 minutes.    Physical Therapy: Branch    Speech Language Pathology:  Branch    Signed by: Ingris Ansari OT

## 2019-08-21 NOTE — THERAPY TREATMENT NOTE
Inpatient Rehabilitation - Speech Language Pathology Treatment Note    [facility]       Patient Name: Darby Workman  : 1945  MRN: [id number]  Today's Date: 2019      Admit Date: 2019  Visit Dx:    No diagnosis found.    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)   • Right-sided headache   • Acute ischemic stroke (CMS/HCC)   • Embolic stroke (CMS/HCC)   • Cerebral infarction due to stenosis of left carotid artery (CMS/HCC)   • Anticoagulated by anticoagulation treatment   • Stroke (cerebrum) (CMS/HCC)      Therapy Treatment  Evaluation/Coping    Evaluation/Treatment Time and Intent  Existing Precautions/Restrictions: fall(communication, swallow) (19 1101 : Bruton, Katherine L)  Document Type: therapy note (daily note) (19 1101 : Bruton, Katherine L)  Mode of Treatment: speech-language pathology (19 1101 : Bruton, Katherine L)  Patient/Family Observations: treatment attempted at bedside, patient was frustrated and was resistant to participate in offered therapy tasks (19 1101 : Bruton, Katherine L)  Start Time (Evaluation/Treatment): 1100 (19 1101 : Bruton, Katherine L)  Stop Time (Evaluation/Treatment): 1115 (19 1101 : Bruton, Katherine L)    Vitals/Pain/Safety    Pain Scale: Numbers Pre/Post-Treatment  Pain Scale: Numbers, Pretreatment: 0/10 - no pain (19 0900 : Bruton, Katherine L)  Pain Scale: Numbers, Post-Treatment: 0/10 - no pain (19 0900 : Bruton, Katherine L)    EDUCATION    The patient has been educated in the following areas:     Communication Impairment.    SLP GOALS     Row Name 19 1100 19 0900 19 1100       Words/Phrases/Sentences Goal 1 (SLP)    Progress (Ability to Contruct Words/Phrases/Sentences Goal 1, SLP)  --  --  with moderate cues  "(50-74%)  -KB    Progress/Outcomes (Identify Objects and Pictures Goal 1, SLP)  --  goal ongoing  -KB  good progress toward goal;goal ongoing  -KB    Comment (Words/Phrases/Sentences Goal 1, SLP)  --  Attempted basic object identification by naming objects around the room. When asked if patient wanted TV turned on, she pointed her remote toward the television but would not accept assistance to turn on TV. She did not identify other named object attempted (x5).  -KB  60% identifying body parts by name, 80% with model  -KB       Comprehend Questions Goal 1 (SLP)    Progress/Outcomes (Comprehend Questions Goal 1, SLP)  goal ongoing  -KB  goal ongoing  -KB  goal ongoing  -KB    Comment (Comprehend Questions Goal 1, SLP)  responded \"no\" to all yes/no questions asked about her family with visual picture book left by a family member  -KB  yes/no questions related to immediate wants/needs asked throughout session, patient perseverated on \"no\" response  -KB  answered basic yes/no questions appropriately x3 in a row then did not respond to next 2 presented questions  -KB       Reading Comprehension at Word Level Goal 1 (SLP)    Progress (Reading Comprehension at Word Level Goal 1, SLP)  --  --  with minimal cues (75-90%)  -KB    Progress/Outcomes (Reading Comprehension at Word Level Goal 1, SLP)  --  goal ongoing  -KB  good progress toward goal;goal ongoing  -KB    Comment (Reading Comprehension at Word Level Goal 1, SLP)  --  Attempted worksheet matching written words to pictures. She was not able to sustain attention adequately to complete task, as she appeared distracted by her frustration.   -KB  80% match written word to pictured object in fo2  -KB       Word Retrieval Skills Goal 1 (SLP)    Progress (Word Retrieval Skills Goal 1, SLP)  --  --  with maximum cues (25-49%)  -KB    Progress/Outcomes (Word Retrieval Goal 1, SLP)  goal ongoing  -KB  goal ongoing  -KB  goal ongoing  -KB    Comment (Word Retrieval Goal 1, SLP)  " "Verbal output consistent of perseverative \"yes/no\" responses to all attempts at communicating. She mostly responded \"no\" except for responding to a few questions regarding immediate environment. Yes responses did not appear appropriate, as patient would then refuse having task carried out (i.e. stated \"yea\" when asked if she wanted her TV on, but would not allow assistance with remote).   -KB  Verbal output consistent of perseverative \"no\" except when asked if I could sit her up in bed, if she wanted her family, and if she wanted the TV on. She responded \"yea\" to these questions.   -KB  attempted to elicit automatic speech by singing common short songs in unision- patient was able to imitate melodies accurately but produce lyrics accurately in 10% or less of utterances across 3 songs, 2 attempts per song  -KB       Augmentative/Alternative Communication Objectives Goal 1 (SLP    Progress (Augmentative/Alternative Communication Goal 1, SLP)  --  --  with moderate cues (50-74%)  -KB    Progress/Outcomes (Augmentative/Alternative Communication Goal 1, SLP)  goal ongoing  -KB  --  goal ongoing  -KB    Comment (Augmentative/Alternative Communication Goal 1, SLP)  basic picture communication board utilized to offer patient to be taken to the bathroom and to be taken to lunch; she responded \"no\" to both attempts  -KB  --  patient was able to identify 3/6 named items on basic picture communication page, 5/6 with cues  -KB    Row Name 08/20/19 0900 08/19/19 1400 08/19/19 1100       Words/Phrases/Sentences Goal 1 (SLP)    Progress (Ability to Contruct Words/Phrases/Sentences Goal 1, SLP)  independently (over 90% accuracy)  -KB  with maximum cues (25-49%)  -KB  --    Progress/Outcomes (Identify Objects and Pictures Goal 1, SLP)  good progress toward goal;goal ongoing  -KB  goal ongoing  -KB  --    Comment (Words/Phrases/Sentences Goal 1, SLP)  96% identifying pictured objects in fo2 by name  -KB  30% identifying named object " in fo2, 60% with cues; 10% accuracy pointing to named body parts, 50% with direct model  -KB  --       Reading Comprehension of Basic Signs and Letters Goal 1 (SLP)    Progress (Reading Comprehension of Basic Signs and Letters Goal 1, SLP)  --  with minimal cues (75-90%)  -KB  independently (over 90% accuracy)  -KB    Progress/Outcomes (Reading Comprehension of Basic Signs and Letters Goal 1, SLP)  --  goal ongoing  -KB  good progress toward goal;goal ongoing  -KB    Comment (Reading Comprehension of Basic Signs and Letters Goal 1, SLP)  --  90% matching letters to like letter in fo4  -KB  100% matching symbol to like symbol in fo4 20% matching word to picture fo5, 70% mod cues  -KB       Reading Comprehension at Word Level Goal 1 (SLP)    Improve Reading Comprehension at Word Level Goal 1 (SLP)  match object to word;matching picture to word;matching written word to dictated word;90%;independently (over 90% accuracy)  -KB  --  --    Time Frame (Reading Comprehension at Word Level Goal 1, SLP)  by discharge  -KB  --  --    Progress (Reading Comprehension at Word Level Goal 1, SLP)  with moderate cues (50-74%)  -KB  --  --    Progress/Outcomes (Reading Comprehension at Word Level Goal 1, SLP)  good progress toward goal;goal ongoing  -KB  --  --    Comment (Reading Comprehension at Word Level Goal 1, SLP)  74% matching written word in fo2 to dictated word  -KB  --  --       Word Retrieval Skills Goal 1 (SLP)    Progress (Word Retrieval Skills Goal 1, SLP)  with maximum cues (25-49%)  -KB  with maximum cues (25-49%);with 1:1 supervision/constant cues  -KB  with maximum cues (25-49%)  -KB    Progress/Outcomes (Word Retrieval Goal 1, SLP)  goal ongoing  -KB  progress slower than expected;goal ongoing  -KB  goal ongoing  -KB    Comment (Word Retrieval Goal 1, SLP)  0%, 60%, 40% counting 1-10 with visual cues and verbal model across 3 trials respectively  -KB  10%, 10%, 30% counting 1-10 with direct model and visual cues   -KB  60% completing well-known rhymes/songs, 83% of responses were phonemic paraphasias characterized by addition of /f/ or/b/ at the beginning of the word  -KB       Graphic Expression of Words Goal 1 (SLP)    Progress (Graphic Expression of Single Words Goal 1, SLP)  with minimal cues (75-90%)  -KB  with minimal cues (75-90%)  -KB  --    Progress/Outcomes (Graphic Expression of Single Words Goal 1, SLP)  good progress toward goal;goal ongoing  -KB  goal ongoing  -KB  --    Comment (Graphic Expression of Single Words Goal 1, SLP)  wrote her name accurately given written prompt and written first letter  -KB  81% accuracy copying single words; writing task paired with pictured items to increase comprehension of written words but patient paid minimal attention to pictures  -KB  --       Planning and Execution of Connected Speech Goal 1 (SLP)    Progress (Planning and Execution of Connected Speech Goal 1, SLP)  with moderate cues (50-74%)  -KB  with moderate cues (50-74%)  -KB  --    Progress/Outcomes (Planning and Execution of Connected Speech Goal 1, SLP)  goal ongoing  -KB  goal ongoing  -KB  --    Comment (Planning and Execution of Connected Speech Goal 1, SLP)  70% imitation of functional CV words, 90% wiht additional cues, 10% approximations  -KB  60% imitation of functional CV words; 80% with visual and verbal cues  -KB  --      User Key  (r) = Recorded By, (t) = Taken By, (c) = Cosigned By    Initials Name Provider Type    KB Bruton, Katherine L Speech and Language Pathologist        Time Calculation:   Time Calculation- SLP     Row Name 08/21/19 1115 08/21/19 0978          Time Calculation- SLP    SLP Start Time  1100  -KB  0900  -KB     SLP Stop Time  1115  -KB  0915  -KB     SLP Time Calculation (min)  15 min  -KB  15 min  -KB       User Key  (r) = Recorded By, (t) = Taken By, (c) = Cosigned By    Initials Name Provider Type    KB Bruton, Katherine L Speech and Language Pathologist        Therapy Charges for  Today     Code Description Service Date Service Provider Modifiers Qty    [id number]  ST TREATMENT SPEECH 4 8/20/2019 Bruton, Katherine L GN 1    [id number]  ST TREATMENT SPEECH 2 8/21/2019 Bruton, Katherine L  1          Katherine L Bruton  8/21/2019

## 2019-08-21 NOTE — PROGRESS NOTES
Inpatient Rehabilitation Plan of Care Note    Plan of Care  Care Plan Reviewed - Updates as Follows    Sphincter Control    [RN] Bladder Management(Active)  Current Status(08/21/2019): incontinent bladder 25%. Urine positive for ESBL  Weekly Goal(08/28/2019): continent 75%  Discharge Goal: continent 100%    [RN] Bowel Management(Active)  Current Status(08/21/2019): incontinent bowel 100%  Weekly Goal(08/28/2019): continent bowel 50%  Discharge Goal: continent bowel 100%    Performed Intervention(s)  Monitor intake and output  Encourage fluid intake  Elimination schedule  contact isolation      Psychosocial    Performed Intervention(s)  calm enviroment      Safety    Performed Intervention(s)  Bed alarm, wc alarm  Safety rounds  Items within reach    Signed by: Emily Guerreor RN

## 2019-08-21 NOTE — PROGRESS NOTES
Inpatient Rehabilitation Functional Measures Assessment    Functional Measures  KATI Eating:  [facility] Grooming: [facility] Bathing:  [facility] Upper Body Dressing:  [facility] Lower Body Dressing:  [facility] Toileting:  [facility] Bladder Management  Level of Assistance:  [place]  Frequency/Number of Accidents this Shift:  [facility] Bowel Management  Level of Assistance: [place]  Frequency/Number of Accidents this Shift: [facility] Bed/Chair/Wheelchair Transfer:  [facility] Toilet Transfer:  [facility] Tub/Shower Transfer:  [place]    Previously Documented Mode of Locomotion at Discharge: Field  KATI Expected Mode of Locomotion at Discharge: [facility] Walk/Wheelchair:  [facility] Stairs:  [facility] Comprehension:  Auditory comprehension is the usual mode. Patient does not  comprehend complex/abstract information in their primary language without  assistance from a helper. Comprehension Score = 3, Moderate Prompting. Patient  comprehends basic daily needs or ideas 50-74% of the time. Patient requires  moderate/some prompting. Patient requires the following assistive device(s):  Glasses.  KATI Expression:  Non-vocal expression is the usual mode. Patient does not  express complex/abstract information in their primary language without a helper.  Expression Score = 2, Maximal Prompting. Patient expresses basic daily needs  25-49% of the time. Patient uses only single words or gestures and requires  maximal/a lot of prompting (most of the time). No assistive devices were  required.  KATI Social Interaction:  Social Interaction Score = 3, Moderate Direction.  Patient interacts appropriately 50-74% of the time.  Patient requires  moderate/some direction for the following behavior(s): Non-cooperative.  KATI Problem Solving:  Activity was not observed.  KATI Memory:  Memory Score = 2, Maximal Prompting. Patient recognizes and  remembers 25-49% of the time. Patient requires maximal/a lot of prompting  (most  of the time) for memory for the following: Disoriented to person, place, time or  situation. Inability to follow multi-step commands. Inability to recognize  people or remember instructions/directions requiring short-term recall (minutes,  hours, days). Needs assistance for use of environmental cues.    Therapy Mode Minutes  Occupational Therapy: Branch  Physical Therapy: Branch  Speech Language Pathology:  Branch    Signed by: Eda Allen RN

## 2019-08-21 NOTE — NURSING NOTE
I picked up patient at 1400. She has refused all therapy, meds and even brief changes. I called Aleshia, her  at work about this. He will be here about 1630 and will try to help. He also is going to call  about his concern about transfer tomorrow to [facility].    1530 Patient will not allow me to check brief, take vitals or start IV or give meds. Called Dr. Howard, He does not wish to snow her with IM Geodon.    1715  Aleshia helped me get vitals except temp. Two aids with [employer] were able to change soiled brief and do davey care. Aleshia also does not want to snow her with geodon and IV ativan is not allwed per dr howard in rehab. Will not allow iv antivbiotic or oral meds. Too agitated. Dr. Howard is aware.    1820 Family has calmed patient a bit. Still won't take oral meds. I did start IV antibiotic and wrapped with kerlex to protect.

## 2019-08-21 NOTE — PROGRESS NOTES
Inpatient Rehabilitation Plan of Care Note    Plan of Care  Care Plan Reviewed - No updates at this time.    Psychosocial    Performed Intervention(s)  Assist patient to express needs and concerns  calm enviroment      Safety    Performed Intervention(s)  Bed alarm, wc alarm  Safety rounds  Items within reach      Body Systems    Performed Intervention(s)  Daily skin inspection      Sphincter Control    Performed Intervention(s)  Monitor intake and output  Encourage fluid intake  Elimination schedule  contact isolation    Signed by: Eda Allen RN

## 2019-08-21 NOTE — THERAPY TREATMENT NOTE
Inpatient Rehabilitation - Occupational Therapy Treatment Note    [facility]     Patient Name: Darby Workman  : 1945  MRN: [id number]    Today's Date: 2019                 Admit Date: 2019      Visit Dx:  No diagnosis found.    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)   • Right-sided headache   • Acute ischemic stroke (CMS/HCC)   • Embolic stroke (CMS/HCC)   • Cerebral infarction due to stenosis of left carotid artery (CMS/HCC)   • Anticoagulated by anticoagulation treatment   • Stroke (cerebrum) (CMS/HCC)         Therapy Treatment    IRF Treatment Summary     Row Name 19 1427 19 1101 19 1100       Evaluation/Treatment Time and Intent    Subjective Information  no complaints  -AF  --  --    Existing Precautions/Restrictions  fall  -AF  fall communication, swallow  -KB  --    Document Type  therapy note (daily note)  -AF  therapy note (daily note)  -KB  --    Mode of Treatment  occupational therapy  -AF  speech-language pathology  -KB  --    Patient/Family Observations  in AM session pt demos increased frustration and demos perservate behaviors, difficulty to redirect to therapeutic tasks or any ADL tasks. in PM treatment attempted bedside but was resistant to any offer or activity  -AF  treatment attempted at bedside, patient was frustrated and was resistant to participate in offered therapy tasks  -KB  --    Start Time (Evaluation/Treatment)  --  1100  -KB  --    Stop Time (Evaluation/Treatment)  --  1130  -KB  --    Unable to Perform (Evaluation/Treatment)  --  --  patient refused Pt unwilling to participate with PT - frustraed re condition  -KP    Recorded by [AF] Ingris Ansari, OTR [KB] Bruton, Katherine L [KP] Marycruz Schmitt, PT    Row Name 19 0900             Evaluation/Treatment  Time and Intent    Existing Precautions/Restrictions  fall communication, swallow  -KB      Document Type  therapy note (daily note)  -KB      Mode of Treatment  speech-language pathology  -KB      Patient/Family Observations  RN reported that patient was frustrated this morning and encouraged tx in patient room. Patient appears frustrated with situation, avoided eye contact with therapist.  -KB      Start Time (Evaluation/Treatment)  0900  -KB      Stop Time (Evaluation/Treatment)  0915  -KB      Recorded by [KB] Bruton, Katherine L      Row Name 08/21/19 1427             Cognition/Psychosocial- PT/OT    Affect/Mental Status (Cognitive)  confused;anxious  -AF      Behavioral Issues (Cognitive)  overwhelmed easily;difficulty managing stress  -AF      Orientation Status (Cognition)  unable/difficult to assess  -AF      Follows Commands (Cognition)  0-24% accuracy  -AF      Personal Safety Interventions  fall prevention program maintained;gait belt;nonskid shoes/slippers when out of bed  -AF      Memory Deficit (Cognitive)  unable/difficult to assess  -AF      Safety Deficit (Cognitive)  ability to follow commands;awareness of need for assistance;insight into deficits/self awareness  -AF      Recorded by [AF] Ingris Ansari OTR      Row Name 08/21/19 1427             Bed Mobility Assessment/Treatment    Comment (Bed Mobility)  got out of bed SBA to switch TV buttons, sit to supine supervision   -AF      Recorded by [AF] Ingris Ansari OTR      Row Name 08/21/19 1427             Functional Mobility    Functional Mobility- Comment  walked from EOB to TV varying assistance level perservate on changing TV channel and holding remote unable to redirect to complete any ADl tasks   -AF      Recorded by [AF] Ingris Ansari OTR      Row Name 08/21/19 1427             Transfer Assessment/Treatment    Comment (Transfers)  assistance level can vary, required assistance of 2 to transfer from w/c to EOB  -AF       Recorded by [AF] Ingris Ansari OTR      Row Name 08/21/19 1427             Bathing Assessment/Treatment    Comment (Bathing)  unable to redirect pt away from holding on to the remote and attempt any ADL tasks or eat breakfast  -AF      Recorded by [AF] Ingris Ansari OTR      Row Name 08/21/19 1427 08/21/19 0900          Pain Scale: Numbers Pre/Post-Treatment    Pain Scale: Numbers, Pretreatment  0/10 - no pain  -AF  0/10 - no pain  -KB     Pain Scale: Numbers, Post-Treatment  0/10 - no pain  -AF  0/10 - no pain  -KB     Recorded by [AF] Ingris Ansari OTR [KB] Bruton, Katherine L     Row Name 08/21/19 1427             Neuromuscular Re-education    Comment (Neuromuscular Re-education)  perservate on using the TV remote, unable to follow directions to remove finger from mute button, resistant to therapist help with any parts of using the remote  -AF      Recorded by [AF] Ingris Ansari OTR      Row Name 08/21/19 1427             Positioning and Restraints    Pre-Treatment Position  in bed  -AF      Post Treatment Position  bed  -AF      In Bed  supine;exit alarm on;encouraged to call for assist;call light within reach;with nsg  -AF      Recorded by [AF] Ingris Ansari OTR        User Key  (r) = Recorded By, (t) = Taken By, (c) = Cosigned By    Initials Name Effective Dates    AF Ingris Ansari OTR 04/03/18 -     Marycruz Norwood, PT 04/03/18 -     KB Bruton, Katherine L 03/07/18 -           Wound 07/17/19 1015 Left neck incision (Active)   Dressing Appearance open to air 8/21/2019  8:13 AM   Closure Liquid skin adhesive 8/21/2019  8:13 AM   Base clean;dry 8/21/2019  8:13 AM   Periwound intact;dry 8/21/2019  8:13 AM   Drainage Amount none 8/21/2019  8:13 AM   Dressing Care, Wound open to air 8/21/2019  8:13 AM         OT Recommendation and Plan    Anticipated Equipment Needs At Discharge (OT Eval): bathing equipment  Planned Therapy Interventions (OT Eval): activity tolerance training, BADL  retraining, cognitive/visual perception retraining, functional balance retraining, neuromuscular control/coordination retraining, occupation/activity based interventions, patient/caregiver education/training, ROM/therapeutic exercise, strengthening exercise, transfer/mobility retraining                Occupational Therapy Education     Title: PT OT SLP Therapies (Not Started)     Topic: Occupational Therapy (In Progress)     Point: ADL training (In Progress)     Description: Instruct learner(s) on proper safety adaptation and remediation techniques during self care or transfers.   Instruct in proper use of assistive devices.    Learning Progress Summary           Patient Acceptance, E, NR,NL by JHONNY at 8/19/2019 11:08 PM    Nonacceptance, E,D, NR by JS at 8/17/2019 11:44 AM    Acceptance, E, VU,NR by AF at 8/15/2019  3:22 PM    Comment:  Pt and family participated in meeting with rehab team, recommend 24 hour supervision and would beneift from being in her own environment. discussed her safety awareness and the need for hands on assistance with some ADL tasks seocndary R UE and cognition    Nonacceptance, E, NR by AF at 8/13/2019  3:48 PM    Comment:  benefits and purpose of therapy   Family Acceptance, E, VU,NR by AF at 8/15/2019  3:22 PM    Comment:  Pt and family participated in meeting with rehab team, recommend 24 hour supervision and would beneift from being in her own environment. discussed her safety awareness and the need for hands on assistance with some ADL tasks seocndary R UE and cognition                   Point: Home exercise program (In Progress)     Description: Instruct learner(s) on appropriate technique for monitoring, assisting and/or progressing therapeutic exercises/activities.    Learning Progress Summary           Patient Acceptance, E, NR,NL by JHONNY at 8/19/2019 11:08 PM    Nonacceptance, E,D, NR by JS at 8/17/2019 11:44 AM    Acceptance, E, VU,NR by AF at 8/15/2019  3:22 PM    Comment:  Pt and  family participated in meeting with rehab team, recommend 24 hour supervision and would beneift from being in her own environment. discussed her safety awareness and the need for hands on assistance with some ADL tasks seocndary R UE and cognition   Family Acceptance, JEFFY BALDWIN,NR by AF at 8/15/2019  3:22 PM    Comment:  Pt and family participated in meeting with rehab team, recommend 24 hour supervision and would beneift from being in her own environment. discussed her safety awareness and the need for hands on assistance with some ADL tasks seocndary R UE and cognition                               User Key     Initials Effective Dates Name Provider Type Discipline    JS 04/06/17 -  Steph Salinas, HESHAM Physical Therapist PT    AF 04/03/18 -  Ingris Ansari, OTR Occupational Therapist OT    WN 06/24/19 -  Luis Abernathy, RN Registered Nurse Nurse                       Time Calculation:     Time Calculation- OT     Row Name 08/21/19 1433             Time Calculation- OT    OT Start Time  0745  -AF      OT Stop Time  0815  -AF      OT Time Calculation (min)  30 min  -AF        User Key  (r) = Recorded By, (t) = Taken By, (c) = Cosigned By    Initials Name Provider Type    AF Ingris Ansari, OTR Occupational Therapist          Therapy Charges for Today     Code Description Service Date Service Provider Modifiers Qty    71958602264 HC OT SELF CARE/MGMT/TRAIN EA 15 MIN 8/20/2019 Ingris Ansari OTR GO 2    27864780065 HC OT NEUROMUSC RE EDUCATION EA 15 MIN 8/20/2019 Ingris Ansari OTR GO 1    71305761551 HC OT THERAPEUTIC ACT EA 15 MIN 8/20/2019 Ingris Ansari OTR GO 1    29530379577 HC OT SELF CARE/MGMT/TRAIN EA 15 MIN 8/21/2019 Ingris Ansari OTR  2                   JOHN Mejia  8/21/2019

## 2019-08-21 NOTE — PLAN OF CARE
Problem: Skin Injury Risk (Adult)  Goal: Skin Health and Integrity  Outcome: Ongoing (interventions implemented as appropriate)      Problem: Fall Risk (Adult)  Goal: Absence of Fall  Outcome: Ongoing (interventions implemented as appropriate)      Problem: Patient Care Overview  Goal: Plan of Care Review  Outcome: Ongoing (interventions implemented as appropriate)   08/21/19 0045   Patient Care Overview   IRF Plan of Care Review progress ongoing, continue   Progress, Functional Goals demonstrating adequate progress   Coping/Psychosocial   Plan of Care Reviewed With patient   OTHER   Outcome Summary Patient is cooperative. Confused. Patient has global aphasia. BS at HS: 65. PRN dextrose gel 15 g administered and after about 15 minutes BS: 139. Medication given in pudding. Bed alarm utiliziced. Call light placed within reach.        Problem: Stroke (IRF) (Adult)  Goal: Promote Optimal Functional [place]  Outcome: Ongoing (interventions implemented as appropriate)

## 2019-08-21 NOTE — PROGRESS NOTES
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  Seemed less anxious. Gait 150 feet CTG.  Today perseverates on saying \"no\" and refused meds/therapies/Accucheck.  . Holds on to remote control but would say \"no\" when asked if wanted TV on.  Not able to determine any focal complaint as persevererate on \"no\". Would not participate with exam.       History taken from: patient chart RN    Objective     Vital Signs  Temp:  [97.5 °F (36.4 °C)-97.9 °F (36.6 °C)] 97.5 °F (36.4 °C)  Heart Rate:  [69-73] 69  Resp:  [16-18] 16  BP: (118-130)/(58-70) 130/70    Objective  Physical Exam  MENTAL STATUS -  AWAKE / ALERT.  Anxious.  HEENT-    LUNGS - normal respirations.     HEART- regular  ABD -     EXT - no edema  NEURO -alert.  Aphasia.  Perseverates on \"no\"      MOTOR EXAM - would not participate in exam         Results Review:     I reviewed the patient's new clinical results.     CT HEAD - AUGUST 11, 2019  FINDINGS:  Old appearing lacunar type infarcts are again noted about the  right thalamus and basal ganglia regions. There are stable areas of  encephalomalacia in the left parietal and occipital lobes medially.  Generalized atrophy. There are moderately extensive scattered areas of  decreased density in the white matter likely related to chronic ischemic  gliotic changes.    No hydrocephalus.    Glucose   Date/Time Value Ref Range Status "   08/21/2019 0729 153 (H) 70 - 130 mg/dL Final   08/20/2019 2118 139 (H) 70 - 130 mg/dL Final   08/20/2019 2054 65 (L) 70 - 130 mg/dL Final   08/20/2019 1559 123 70 - 130 mg/dL Final   08/20/2019 1053 148 (H) 70 - 130 mg/dL Final   08/20/2019 0523 108 70 - 130 mg/dL Final   08/19/2019 2050 102 70 - 130 mg/dL Final   08/19/2019 1617 107 70 - 130 mg/dL Final     Results from last 7 days   Lab Units 08/21/19  0605 08/19/19  0739 08/16/19  0710   WBC 10*3/mm3 4.86 5.32 5.24   HEMOGLOBIN g/dL 9.8* 10.3* 9.8*   HEMATOCRIT % 30.6* 33.7* 32.4*   PLATELETS 10*3/mm3 324 382 275       Ref. Range 5/22/2019 05:44 5/22/2019 11:34 7/9/2019 08:25 7/18/2019 04:49 7/19/2019 05:05 7/23/2019 05:56 8/1/2019 06:48   Hemoglobin Latest Ref Range: 12.0 - 15.9 g/dL 10.8 (L)  10.6 (L) 8.5 (L) 9.7 (L) 9.3 (L) 7.4 (L)   Hematocrit Latest Ref Range: 34.0 - 46.6 % 35.1  33.4 (L) 26.2 (L) 29.9 (L) 28.6 (L) 23.6 (L)     Results from last 7 days   Lab Units 08/21/19  0605 08/19/19  0739 08/16/19  0710   SODIUM mmol/L 138 137 140   POTASSIUM mmol/L 3.5 3.8 3.8   CHLORIDE mmol/L 99 101 103   CO2 mmol/L 28.1 27.3 27.2   BUN mg/dL 19 12 17   CREATININE mg/dL 0.65 0.63 0.64   CALCIUM mg/dL 8.8 8.9 8.7   GLUCOSE mg/dL 117* 168* 137*         Ref. Range 8/1/2019 06:47   25 Hydroxy, Vitamin D Latest Ref Range: 30.0 - 100.0 ng/ml 21.8 (L)       Ref. Range 8/1/2019 06:47   Total Cholesterol Latest Ref Range: 0 - 200 mg/dL 105   HDL Cholesterol Latest Ref Range: 40 - 60 mg/dL 40   LDL Cholesterol  Latest Ref Range: 0 - 100 mg/dL 57   VLDL Cholesterol Latest Ref Range: 5 - 40 mg/dL 8   Triglycerides Latest Ref Range: 0 - 150 mg/dL 40   Medication Review: done  Scheduled Meds:    amLODIPine 5 mg Oral BID - RT   apixaban 5 mg Oral Q12H   aspirin 325 mg Oral Daily   atorvastatin 80 mg Oral Nightly   brimonidine 1 drop Both Eyes BID   dorzolamide 1 drop Both Eyes BID   ertapenem 1 g Intravenous Q24H   glipiZIDE 7.5 mg Oral BID AC   hydrALAZINE 50 mg Oral Q8H    insulin lispro 0-7 Units Subcutaneous 4x Daily With Meals & Nightly   lansoprazole 30 mg Oral BID AC   losartan-HCTZ (HYZAAR) 100-12.5 combo dose  Oral Daily   memantine 5 mg Oral Q12H   metFORMIN 1,000 mg Oral BID With Meals   nebivolol 20 mg Oral BID   nystatin 5 mL Swish & Spit 4x Daily   PARoxetine 10 mg Oral Daily   potassium chloride 20 mEq Oral Daily With Breakfast   vitamin D 50,000 Units Oral Q7 Days     Continuous Infusions:   PRN Meds:.•  acetaminophen  •  aluminum-magnesium hydroxide-simethicone  •  dextrose  •  dextrose  •  glucagon (human recombinant)  •  nitroglycerin      Assessment/Plan       Stroke (cerebrum) (CMS/[facility])      Assessment & Plan  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  If add Namenda, will probably discontinue amantadine as she continues alert.  August 11-she has some increased irritability at times.  This may be related to the underlying stroke deficits itself.  She does not appear to have any dysuria, no odor to her urine.  Staff reports that for the most part she is eating okay.  Will check a follow-up CT of the head to assess for any interval visual changes.  She is on aspirin and Eliquis for stroke prophylaxis.  She had noticeably improved alertness when amantadine was started.  She is readily alert now.  This may be related to natural recovery in terms of her level of arousal at this point.  Current plan is to discontinue the amantadine to see if that helps with her irritability and start  on Namenda for aphasia.  August 12 -  Patient with increased restlessness at times.   Continues aphasia. Not able to identify any complaint.  Appears anxious today. No change on CT head yesterday. Started on Namenda for aphasia on 8/12/19.   August 13- will continue to monitor on recent med adjustments    August 15-She continues with expressive language deficits.    Again appears anxious related to her aphasia and difficulty expressing herself.    She will be able to get occasional single word for the examiner.  She does follow instructions.  The patient was reviewed with .  Discussed adding on an antidepressant with anxiolytic properties -Paxil-as it appears frustration from her aphasia with associated anxiety with the frustration affects her performance.  We will plan on starting Paxil 10 mg daily tomorrow and monitor response.  Reviewed with the patient's  that would not add a short acting anxiolytic (such as a benzodiazepine or Seroquel) as it may affect her cognitive performance.    August 16-started Paxil 10 mg daily  August 19 - Increased restlessness Friday night - ? Related to UTI vs initiation of Paxil. On Cefdinir for GNR pending ID &Sensitivity.  August 20 -urine culture with E. coli ESBL.  Given her aphasia not able to obtain information regarding dysuria.  As the urinalysis was ordered as part of work-up for increased restlessness this weekend, would have to treat as symptomatic although could be asymptomatic bacteriuria.  Will place on ertapenem 1 g IV daily for 3-5 days.  August 21 - increased restlessness/anxiety today. Labs without any acute change. Blood glucose okay this AM. BP pattern okay. No gross motor changes. Follow on current regimen for now.     Aphasia -  August 12 - trial of Namenda  August 16-continue to observe on Namenda 5 mg daily for her aphasia and may possibly titrate up next week  August 20-titrate up on Namenda to 5 mg twice a day    Dysphagia-Cortrak feeding tube discontinued on July 31 and started on puréed/honey thick liquid diet with strategies  August 7-advance to fork mash nectar thick liquid diet, consistent carbohydrate.  August 14 - repeat VFSS to assess for advancing to thin liquids  August 15-Video fluoroscopic swallow study today-mechanical soft, no  mixed consistency, nectar thick liquid, water protocol between meals, no straws.    History of multiple bilateral strokes and left central retinal artery occlusion    History of left greater than right internal carotid artery stenosis-status post left internal carotid artery stent July 17, 2019    History of hypertension -  Hyzaar 100-25. August 4 - Bystolic increased to 20 mg daily to 20 mg bid.  August 8 - BP still runs high. On discharge in May 2019 was on Hyzaar 100-25 mg daily, Bystolic 20 mg daily, amlodipine 10 mg daily, Hydralazine 50 mg q 8 hours.  Will add Hydralazine initially 10 mg po q 8 hours and titrate up as needed.   August 9-systolic blood pressure elevated last night and early this morning but better this afternoon at 122-130.  Continue to follow recent addition of hydralazine and titrate up as needed  August 10-systolic blood pressure 175 this afternoon, range otherwise 122-142.  Will titrate up on hydralazine to 20 mg every 8 hours.  August 11-hypertension overall appears improved.  Will titrate up further to 25 mg every 8 hours.  August 12 - add amlodipine 5 mg daily.   August 14 - titrate up on hydralazine to 50 mg q 8 hours  August 15-blood pressure improved this afternoon with systolic blood pressure in the 120s-follow on current regimen  August 16-blood pressure range 110////59-will continue to monitor on present regimen  August 19 - /74 early AM, later 136/71 - increase amlodipine to 5 mg bid    History of diabetes mellitus -Lantus/glipizide (home medication metformin 1000 mg twice daily and glipizide 10 mg twice daily)  August 1-blood sugars were low yesterday afternoon after tube feeds discontinued.  Held glipizide last night and this morning and Lantus last night.  Blood sugar elevated at noontime today.  Will receive resume glipizide at a lower dose of 5 mg twice a day with meals.  Continue sliding scale insulin coverage.  She also is on metformin at  home.  August 5-add back metformin initially at 500 mg twice a day and continue glipizide 5 mg twice a day, both one half of previous home dose, titrate up as needed.  August 7-titrate up metformin to 850 mg twice a day.  Add consistent carbohydrate restriction to diet.  August 9-increase metformin to 1000 g twice a day.  August 10-blood glucose 340 last evening but 150-114-178 so far today  August 11-continue to follow blood sugar pattern and may increase glipizide further as current dose glipizide 5 mg twice a day along with metformin 1000 mg twice a day  August 14 - blood glucose  past 24 hours - monitor pattern.  August 15-blood glucose 215-101-243--161-66-continue to follow pattern.  Will change sliding scale insulin coverage to Humalog at a lower dose.  She was started on the regular insulin sliding scale when she was on tube feeds.  August 16 - recent blood glucose -996-138  August 19 - recent blood glucose [phone] - increase glipizide to 7.5 mg bid  August 21 - refused blood glucose check today. Will decrease glipizide back to 5 mg bid to avoid potential for hypoglycemia until allow blood glucose check.     Stroke prophylaxis-aspirin/Eliquis/atorvastatin    Anemia-August 1-hemoglobin 7.4.  Most recent check on July 23 HGB 9.3.  Will recheck hemoglobin this afternoon.  Hemoccult stool.  Iron studies.  Reticulocyte count.  Recheck CBC in the a.m.  Added Protonix.  Patient is on aspirin and Eliquis.  With recent stroke  will look to transfuse packed red blood cell.  August 2-hemoglobin improved 9.0-1 unit packed red blood cells.  Elevated reticulocyte count in response to anemia.  Nursing describes dark stool.  Patient discussed with gastroenterology.  At this point unable to do endoscopy as on Eliquis and aspirin.  Will treat symptomatically for now with increasing proton pump inhibitor and transfusing as needed.  August 5-anemia improved-hemoglobin 9.8  August 16-hemoglobin unchanged  9.8    DVT prophylaxis-SCDs/anticoagulation    Impulsivity-   Nursing to do re-orientation with the patient.  Room close to the nursing station.    Endocrine-vitamin D deficiency-ergocalciferol 50,000 units weekly x8 weeks added. Vitamin B12 level and TSH checked in late May unremarkable    Urinary tract infection-August 20 -urine culture with E. coli ESBL.  Given her aphasia not able to obtain information regarding dysuria.  As the urinalysis was ordered as part of work-up for increased restlessness this weekend, would have to treat as symptomatic although could be asymptomatic bacteriuria.  Will place on ertapenem 1 g IV daily for 3-5 days.     Impaired cognition/impaired language, impaired swallow, impaired activity daily living, impaired mobility     Now admit for comprehensive acute inpatient rehabilitation .  This would be an interdisciplinary program with physical therapy 1 hour,  occupational therapy 1 hour, and speech therapy 1 hour, 5 days a week.  Rehabilitation nursing for carryover, monitoring of cardiopulmonary and neurologic   status, bowel and bladder, and skin  Ongoing physician follow-up.  Weekly team conferences.  Goals are indeterminant.   Rehabilitation prognosis determined.  Medical prognosis determined.  Estimated length of stay is indeterminate.    TEAM CONF - AUGUST 6- TRANFERS CTG. GAIT UP  FEET CTG-MIN ASSIST. UBD MIN. LBD MOD. BATH MOD. SEVERE APHASIA. INCREASED RESISTANCE TO PARTICIPATE AT TIMES.   PUREE/HTL.  GOOD PO INTAKE. ADJUSTING MEDS FOR DIABETES MELLITUS.  BLADDER CONTINENT/INCONTIENT.   ELOS- 2 WEEKS.     TEAM CONF - AUGUST 13 - DID GREAT WITH PT ON Saturday, FOLLOWING COMMANDS AND BATTING A BALL WITH ANOTHER PATIENT. YESTERDAY, VERY FRUSTRATED AND IRRITABLE.  FRUSTRATED BY APHASIA, TRYING TO TELL STAFF SOMETHING. WILL HOLD ON TO OBJECTS, REPEAT SINGLE WORD.   BED CTG. 4 STAIRS CTG.  GAIT 220 FEET CTG-SBA NO DEVICE.  TOILET TRANSFERS CTG-MIN.  GROOMING MIN.  UBD MIN ASSIST  FOR BRA, LBD CTG-MIN. BATH CTG-MIN. COORDINATION RUE BETTER.  DYSPHAGIA - IMPROVED - FORK TALA, NTL. DO NOT FEEL SHE WOULD TOLERATE E-STIM. RECEPTIVE LANGUAGE IMPROVED SLIGHTLY , POINTING TO OBJECTS. EXPRESSIVELY PERSEVERATIVE ON A SINGLE WORD, TRIES TO USE PICTURE BOARD TO COMMUNICATE. YES/NO NOT ACCURATE.  CONTINENT BOWEL AND BLADDER, TIMED VOIDS. SKIN INTACT. TYPICALLY GOOD INTAKE.  ELOS - 1.5 WEEKS    AUGUST 14 - In therapies - In OT, increased participation noted with  present   Transfers SBA/CTG  Walked from therapy gym to room without AD SBA and vc's for directions back to the room   300' outdoors on sidewalk, incline; 300', 180', 100' up on rehab unit. Pt was able to find her room when she got close to it  Bathing, dressing, grooming min assist. Toileting moderate assist.  Pt participated in using R hand to manipualte round pegs into peg board by color with MIN difficutly once pt caught on to the task. with 3D block recreation with R hand with 100% color match, 80% accuracy with block recreation  With SLP, patient was not able to effectively communicate her wants/needs but refused to go to therapy office by planting her heels while rolling in wc down the [place]; tx session completed in her room     August 15-The patient was reviewed with .  Discussed adding on an antidepressant with anxiolytic properties -Paxil-as it appears frustration from her aphasia with associated anxiety with the frustration affects her performance.  We will plan on starting Paxil 10 mg daily tomorrow and monitor response.  Reviewed with the patient's  that would not add a short acting anxiolytic (such as a benzodiazepine or Seroquel) as it may affect her cognitive performance.    August 16-Patient was reviewed with her daughter today including rehabilitation goals, discharge planning, and recent medication adjustment to try to help with her anxiety/frustration with her aphasia.  Reviewed with the daughter that on  discussion with the team is felt that she would do better with her functional status/aphasia/anxiety if able to be discharged to home environment with more frequent interactions but if that is not feasible with the need to look at subacute options.    TEAM CONF - AUGUST 20 - GAIT CTG- FEET. NO DEVICE, WALKS TO AND FROM GYM.  ADLS CTG WITH CUING.  PLAYED ENTIRE GAME OF CHECKERS YESTERDAY. VARIABLE PERFORMANCE WITH SPEECH THERAPY 20-90% MATCHING WORDS TO PICTURES.    MECH SOFT, GROUND MEAT, NECTAR THICK LIQUIDS.   TYPICALLY CONTINENT BLADDER BUT INCONTINENT BOWEL. DID FINE LAST EVENING PER SISTER AND DID NOT TRY TO GET UP- WILL DISCONTINUE SITTER. IN ROOM CLOSE TO NURSING STATION.   BEST- FAMILY CONF LAST WEEK- WILL WORK TOWARD SUBACUTE PLACEMENT      Barrier to discharge includes  Impaired cognitive-linguistic skills - work on receptive and expressive language and cognition.       Ajit Howard MD  08/21/19  1:25 PM    Time:

## 2019-08-21 NOTE — PROGRESS NOTES
Inpatient Rehabilitation Functional Measures Assessment    Functional Measures  KATI Eating:  [facility] Grooming: [facility] Bathing:  [facility] Upper Body Dressing:  [facility] Lower Body Dressing:  [facility] Toileting:  [facility] Bladder Management  Level of Assistance:  [place]  Frequency/Number of Accidents this Shift:  [facility] Bowel Management  Level of Assistance: [place]  Frequency/Number of Accidents this Shift: [facility] Bed/Chair/Wheelchair Transfer:  [facility] Toilet Transfer:  [facility] Tub/Shower Transfer:  [place]    Previously Documented Mode of Locomotion at Discharge: Field  KATI Expected Mode of Locomotion at Discharge: [facility] Walk/Wheelchair:  [facility] Stairs:  [facility] Comprehension:  Auditory comprehension is the usual mode. Patient does not  comprehend complex/abstract information in their primary language without  assistance from a helper. Comprehension Score = 1, Total Assistance.  Patient  understands basic daily needs less than 25% of the time and/or does not  understand simple information such as single words or gestures. No assistive  devices were required.  KATI Expression:  Non-vocal expression is the usual mode. Patient does not  express complex/abstract information in their primary language without a helper.  Expression Score = 1, Total Assistance. Patient expresses basic daily needs less  than 25% of the time, or does not express basic needs appropriately or  consistently despite prompting. Patient requires the following assistive  device(s): Communication board/device.  KATI Social Interaction:  Social Interaction Score = 1, Total Assistance. Patient  interacts appropriately less than 25% of the time.  Patient requires total  assistance (directing all or almost all of the time) for the following  behavior(s): Non-cooperative.  KATI Problem Solving:  Patient does not make appropriate decisions in order to  solve complex problems without assistance from  a helper. Problem Solving Score =  1, Total Assistance. Patient makes appropriate decisions in order to solve  routine problems less than 25% of the time. Patient requires total direction for  the following behavior(s): Difficulty completing tasks. Difficulty initiating  tasks. Difficulty sequencing tasks. Difficulty weighing alternatives/making  choices. Exhibits poor planning. Poor judgment.  KATI Memory:  Memory Score = 1, Total Assistance. Patient recognizes and  remembers less than 25% of the time. Patient requires total assistance  (prompting all or almost all of the time) for memory for the following:  Disoriented to person, place, time or situation. Unaware of daily routine.  Inability to recognize people or remember instructions/directions requiring  immediate recall (seconds).    Therapy Mode Minutes  Occupational Therapy: Branch  Physical Therapy: Branch  Speech Language Pathology:  Branch    Signed by: Emily Guerrero RN

## 2019-08-21 NOTE — PLAN OF CARE
"Problem: Skin Injury Risk (Adult)  Goal: Skin Health and Integrity  Outcome: Ongoing (interventions implemented as appropriate)      Problem: Fall Risk (Adult)  Goal: Absence of Fall  Outcome: Ongoing (interventions implemented as appropriate)      Problem: Patient Care Overview  Goal: Plan of Care Review  Outcome: Ongoing (interventions implemented as appropriate)   08/21/19 1321   Patient Care Overview   IRF Plan of Care Review progress ongoing, continue   Progress, Functional Goals progress more gradual than expected   Coping/Psychosocial   Plan of Care Reviewed With patient   OTHER   Outcome Summary Pt. remains confused. Has not participated with therapies today. Refused medication. Only ate a small amount of oatmeal for breakfast. Has global aphasia and repeatedly says \"no\".       Problem: Stroke (IRF) (Adult)  Goal: Promote Optimal Functional [place]  Outcome: Ongoing (interventions implemented as appropriate)        "

## 2019-08-21 NOTE — PROGRESS NOTES
Inpatient Rehabilitation Functional Measures Assessment and Plan of Care    Plan of Care  Updated Problems/Interventions  Field    Functional Measures  KATI Eating:  Branch  KATI Grooming: Branch  [facility] Bathing:  Branch  [facility] Upper Body Dressing:  Branch  [facility] Lower Body Dressing:  Branch  [facility] Toileting:  Branch    [facility] Bladder Management  Level of Assistance:  [place]  Frequency/Number of Accidents this Shift:  [facility] Bowel Management  Level of Assistance: [place]  Frequency/Number of Accidents this Shift: Branch    [facility] Bed/Chair/Wheelchair Transfer:  Bed/chair/wheelchair Transfer did not occur  because patient refused. .  KATI Toilet Transfer:  Branch  [facility] Tub/Shower Transfer:  Branch    Previously Documented Mode of Locomotion at Discharge: Field  [facility] Expected Mode of Locomotion at Discharge: [place]  [facility] Walk/Wheelchair:  WHEELCHAIR OBSERVATION   Wheelchair did not occur because patient refused.    WALK OBSERVATION   Walking did not occur because patient refused.  KATI Stairs:  Stairs did not occur because patient refused.    KATI Comprehension:  Branch  KATI Expression:  Branch  [facility] Social Interaction:  Branch  [facility] Problem Solving:  [facility] Memory:  Branch    Therapy Mode Minutes  Occupational Therapy: Branch  Physical Therapy: Individual: 0 minutes.  Speech Language Pathology:  Branch    Signed by: Marycruz Schmitt, PT

## 2019-08-21 NOTE — PROGRESS NOTES
Patient continued today with  increased restlessness/anxiety/refusing aspects of nursing care. She had had a good day in therapies yesterday. She is calmer now with family present. Did allow IV antibiotic to be infused but has not taken PO meds or eaten dinner yet.    Discussed with patient's /children will need to hold on planned discharge tomorrow given her mood/behavior today.    I do not feel Namenda (started as trial for aphasia) is related as had episodes of restlessness prior to starting, but will discontinue for now so  slate with psychoactive medications. Continue Paxil for now. Will consult Psychiatry for input.  Have ordered sitter.  If does not take Eliquis this evening, will need to dose with Lovenox.   Patient reviewed with nursing.

## 2019-08-22 ENCOUNTER — APPOINTMENT (OUTPATIENT)
Dept: CARDIOLOGY | Facility: HOSPITAL | Age: 74
End: 2019-08-22

## 2019-08-22 LAB
BH CV VAS CAROTID LEFT DISTAL STENT EDV: 20 CM/S
BH CV VAS CAROTID LEFT DISTAL STENT: 112 CM/S
BH CV VAS CAROTID LEFT DISTAL TO STENT EDV: 14.7 CM/S
BH CV VAS CAROTID LEFT DISTAL TO STENT: 89.7 CM/S
BH CV VAS CAROTID LEFT MID STENT EDV: 21.2 CM/S
BH CV VAS CAROTID LEFT MID STENT: 102 CM/S
BH CV VAS CAROTID LEFT PROXIMAL STENT EDV: 5.71 CM/S
BH CV VAS CAROTID LEFT PROXIMAL STENT: 29.4 CM/S
BH CV VAS CAROTID LEFT PROXIMAL TO STENT: 84.7 CM/S
BH CV VAS CAROTID LEFT STENT NATIVE VESSEL PROXIMAL ED: 11 CM/S
BH CV XLRA MEAS LEFT DIST CCA EDV: 7.8 CM/SEC
BH CV XLRA MEAS LEFT DIST CCA PSV: 95.3 CM/SEC
BH CV XLRA MEAS LEFT DIST ICA EDV: -12.7 CM/SEC
BH CV XLRA MEAS LEFT DIST ICA PSV: -65.6 CM/SEC
BH CV XLRA MEAS LEFT ICA/CCA RATIO: 1.1
BH CV XLRA MEAS LEFT MID ICA EDV: -19.1 CM/SEC
BH CV XLRA MEAS LEFT MID ICA PSV: -97.4 CM/SEC
BH CV XLRA MEAS LEFT PROX CCA EDV: -8.1 CM/SEC
BH CV XLRA MEAS LEFT PROX CCA PSV: -81.1 CM/SEC
BH CV XLRA MEAS LEFT PROX ECA PSV: -319 CM/SEC
BH CV XLRA MEAS LEFT PROX ICA EDV: -14.9 CM/SEC
BH CV XLRA MEAS LEFT PROX ICA PSV: -108 CM/SEC
BH CV XLRA MEAS LEFT PROX SCLA PSV: 237 CM/SEC
BH CV XLRA MEAS LEFT VERTEBRAL A EDV: -12.5 CM/SEC
BH CV XLRA MEAS LEFT VERTEBRAL A PSV: -62.7 CM/SEC
BH CV XLRA MEAS RIGHT DIST CCA EDV: 14.1 CM/SEC
BH CV XLRA MEAS RIGHT DIST CCA PSV: 93.8 CM/SEC
BH CV XLRA MEAS RIGHT DIST ICA EDV: -23.6 CM/SEC
BH CV XLRA MEAS RIGHT DIST ICA PSV: -86.9 CM/SEC
BH CV XLRA MEAS RIGHT ICA/CCA RATIO: 1.4
BH CV XLRA MEAS RIGHT MID ICA EDV: -26 CM/SEC
BH CV XLRA MEAS RIGHT MID ICA PSV: -94.4 CM/SEC
BH CV XLRA MEAS RIGHT PROX CCA EDV: 11.7 CM/SEC
BH CV XLRA MEAS RIGHT PROX CCA PSV: 72.1 CM/SEC
BH CV XLRA MEAS RIGHT PROX ECA PSV: -184 CM/SEC
BH CV XLRA MEAS RIGHT PROX ICA EDV: -20.7 CM/SEC
BH CV XLRA MEAS RIGHT PROX ICA PSV: -134 CM/SEC
BH CV XLRA MEAS RIGHT PROX SCLA PSV: 154 CM/SEC
BH CV XLRA MEAS RIGHT VERTEBRAL A EDV: 12.8 CM/SEC
BH CV XLRA MEAS RIGHT VERTEBRAL A PSV: 65.4 CM/SEC
BH CVPROX LEFT ICA HIDDEN LRR: 1 CM
GLUCOSE BLDC GLUCOMTR-MCNC: 155 MG/DL (ref 70–130)
GLUCOSE BLDC GLUCOMTR-MCNC: 175 MG/DL (ref 70–130)
GLUCOSE BLDC GLUCOMTR-MCNC: 184 MG/DL (ref 70–130)
GLUCOSE BLDC GLUCOMTR-MCNC: 90 MG/DL (ref 70–130)
LEFT ARM BP: NORMAL MMHG
RIGHT ARM BP: NORMAL MMHG

## 2019-08-22 PROCEDURE — 63710000001 INSULIN LISPRO (HUMAN) PER 5 UNITS: Performed by: PHYSICAL MEDICINE & REHABILITATION

## 2019-08-22 PROCEDURE — 25010000002 ERTAPENEM PER 500 MG: Performed by: PHYSICAL MEDICINE & REHABILITATION

## 2019-08-22 PROCEDURE — 97110 THERAPEUTIC EXERCISES: CPT

## 2019-08-22 PROCEDURE — 82962 GLUCOSE BLOOD TEST: CPT

## 2019-08-22 PROCEDURE — 92507 TX SP LANG VOICE COMM INDIV: CPT

## 2019-08-22 PROCEDURE — 93880 EXTRACRANIAL BILAT STUDY: CPT

## 2019-08-22 PROCEDURE — 97535 SELF CARE MNGMENT TRAINING: CPT

## 2019-08-22 RX ORDER — OLANZAPINE 10 MG/1
5 INJECTION, POWDER, LYOPHILIZED, FOR SOLUTION INTRAMUSCULAR EVERY 8 HOURS PRN
Status: DISCONTINUED | OUTPATIENT
Start: 2019-08-22 | End: 2019-08-29

## 2019-08-22 RX ADMIN — INSULIN LISPRO 2 UNITS: 100 INJECTION, SOLUTION INTRAVENOUS; SUBCUTANEOUS at 11:52

## 2019-08-22 RX ADMIN — BRIMONIDINE TARTRATE 1 DROP: 2 SOLUTION OPHTHALMIC at 21:49

## 2019-08-22 RX ADMIN — POTASSIUM CHLORIDE 20 MEQ: 1.5 POWDER, FOR SOLUTION ORAL at 08:42

## 2019-08-22 RX ADMIN — DORZOLAMIDE HYDROCHLORIDE 1 DROP: 20 SOLUTION/ DROPS OPHTHALMIC at 21:49

## 2019-08-22 RX ADMIN — NEBIVOLOL HYDROCHLORIDE 20 MG: 10 TABLET ORAL at 08:40

## 2019-08-22 RX ADMIN — METFORMIN HYDROCHLORIDE 1000 MG: 1000 TABLET ORAL at 08:41

## 2019-08-22 RX ADMIN — NYSTATIN 500000 UNITS: 100000 SUSPENSION ORAL at 17:25

## 2019-08-22 RX ADMIN — ERTAPENEM SODIUM 1 G: 1 INJECTION, POWDER, LYOPHILIZED, FOR SOLUTION INTRAMUSCULAR; INTRAVENOUS at 14:53

## 2019-08-22 RX ADMIN — LANSOPRAZOLE 30 MG: KIT at 05:15

## 2019-08-22 RX ADMIN — PAROXETINE HYDROCHLORIDE 10 MG: 10 TABLET, FILM COATED ORAL at 08:41

## 2019-08-22 RX ADMIN — NYSTATIN 500000 UNITS: 100000 SUSPENSION ORAL at 21:49

## 2019-08-22 RX ADMIN — ASPIRIN 325 MG: 325 TABLET ORAL at 08:39

## 2019-08-22 RX ADMIN — HYDRALAZINE HYDROCHLORIDE 50 MG: 50 TABLET, FILM COATED ORAL at 14:58

## 2019-08-22 RX ADMIN — ACETAMINOPHEN 1000 MG: 500 TABLET, FILM COATED ORAL at 05:23

## 2019-08-22 RX ADMIN — GLIPIZIDE 5 MG: 5 TABLET ORAL at 17:26

## 2019-08-22 RX ADMIN — NYSTATIN 500000 UNITS: 100000 SUSPENSION ORAL at 11:53

## 2019-08-22 RX ADMIN — APIXABAN 5 MG: 5 TABLET, FILM COATED ORAL at 08:39

## 2019-08-22 RX ADMIN — METFORMIN HYDROCHLORIDE 1000 MG: 1000 TABLET ORAL at 17:26

## 2019-08-22 RX ADMIN — HYDRALAZINE HYDROCHLORIDE 50 MG: 50 TABLET, FILM COATED ORAL at 05:15

## 2019-08-22 RX ADMIN — INSULIN LISPRO 2 UNITS: 100 INJECTION, SOLUTION INTRAVENOUS; SUBCUTANEOUS at 17:25

## 2019-08-22 RX ADMIN — HYDRALAZINE HYDROCHLORIDE 50 MG: 50 TABLET, FILM COATED ORAL at 21:50

## 2019-08-22 RX ADMIN — NEBIVOLOL HYDROCHLORIDE 20 MG: 10 TABLET ORAL at 21:50

## 2019-08-22 RX ADMIN — ERGOCALCIFEROL 50000 UNITS: 1.25 CAPSULE ORAL at 11:53

## 2019-08-22 RX ADMIN — LANSOPRAZOLE 30 MG: KIT at 18:11

## 2019-08-22 RX ADMIN — AMLODIPINE BESYLATE 5 MG: 5 TABLET ORAL at 08:41

## 2019-08-22 RX ADMIN — LOSARTAN POTASSIUM: 50 TABLET, FILM COATED ORAL at 08:38

## 2019-08-22 RX ADMIN — AMLODIPINE BESYLATE 5 MG: 5 TABLET ORAL at 21:49

## 2019-08-22 RX ADMIN — NYSTATIN 500000 UNITS: 100000 SUSPENSION ORAL at 08:39

## 2019-08-22 RX ADMIN — APIXABAN 5 MG: 5 TABLET, FILM COATED ORAL at 21:49

## 2019-08-22 RX ADMIN — DORZOLAMIDE HYDROCHLORIDE 1 DROP: 20 SOLUTION/ DROPS OPHTHALMIC at 08:41

## 2019-08-22 RX ADMIN — BRIMONIDINE TARTRATE 1 DROP: 2 SOLUTION OPHTHALMIC at 08:41

## 2019-08-22 RX ADMIN — GLIPIZIDE 5 MG: 5 TABLET ORAL at 08:39

## 2019-08-22 RX ADMIN — ATORVASTATIN CALCIUM 80 MG: 80 TABLET, FILM COATED ORAL at 21:51

## 2019-08-22 NOTE — PROGRESS NOTES
Inpatient Rehabilitation Functional Measures Assessment    Functional Measures  KATI Eating:  [facility] Grooming: [facility] Bathing:  [facility] Upper Body Dressing:  [facility] Lower Body Dressing:  [facility] Toileting:  [facility] Bladder Management  Level of Assistance:  [place]  Frequency/Number of Accidents this Shift:  [facility] Bowel Management  Level of Assistance: [place]  Frequency/Number of Accidents this Shift: [facility] Bed/Chair/Wheelchair Transfer:  [facility] Toilet Transfer:  [facility] Tub/Shower Transfer:  [place]    Previously Documented Mode of Locomotion at Discharge: Field  KATI Expected Mode of Locomotion at Discharge: [facility] Walk/Wheelchair:  [facility] Stairs:  [facility] Comprehension:  Auditory comprehension is the usual mode. Patient does not  comprehend complex/abstract information in their primary language without  assistance from a helper. Comprehension Score = 2, Maximal Prompting. Patient  comprehends basic daily needs 25-49% of the time. Patient understands simple  information via single words or gestures. Requires maximal/a lot of prompting  (most of the time). Patient requires the following assistive device(s): Glasses.    KATI Expression:  Non-vocal expression is the usual mode. Patient does not  express complex/abstract information in their primary language without a helper.  Expression Score = 2, Maximal Prompting. Patient expresses basic daily needs  25-49% of the time. Patient uses only single words or gestures and requires  maximal/a lot of prompting (most of the time). No assistive devices were  required.  KATI Social Interaction:  Social Interaction Score = 6, Modified Independent.  Patient is modified independent for social interaction, requiring: Requires  additional time. Requires a structured or modified environment. Medications.  KATI Problem Solving:  Activity was not observed.  KATI Memory:  Memory Score = 2, Maximal Prompting. Patient  recognizes and  remembers 25-49% of the time. Patient requires maximal/a lot of prompting (most  of the time) for memory for the following: Inability to recognize people or  remember instructions/directions requiring short-term recall (minutes, hours,  days). Disoriented to person, place, time or situation. Needs assistance for use  of environmental cues. Needs assistance for use of memory aids.    Therapy Mode Minutes  Occupational Therapy: [facility]  Physical Therapy: [facility]  Speech Language Pathology:  [facility]    Signed by: Eda Allen RN

## 2019-08-22 NOTE — CONSULTS
"IDENTIFYING INFORMATION: The patient is a 74-year-old white female who is status post left-sided CVA with resultant aphasia.  She is seen related to some depression and occasional episodes of less than optimal cooperation with care.    CHIEF COMPLAINT: None given    INFORMANT: Staff and chart.  Patient is unable to provide much in the way of useful information.    RELIABILITY: Good    HISTORY OF PRESENT ILLNESS: The patient is a 74-year-old white female with no reported psychiatric history.  She was admitted to rehab on 7/31/2019 having suffered multiple strokes including a recent left-sided CVA which is left her with a aphasia.  During interview, patient is frustrated by her inability to express herself frequently stating only \"yeah\" or \"no\".  Staff reports that the patient has \"good days and bad days.  They report that yesterday was particularly problematic with the patient refusing care and medications but reports that she is having a better day today.  Dr. Howard started the patient on Paxil on 8/16/2019.  She is currently on a conservative dose of 10 mg daily.  According to staff the patient is sleeping reasonably well.  She is on a honey thick puréed diet secondary to swallowing issues.     PAST PSYCHIATRIC HISTORY: None reported    PAST MEDICAL HISTORY: As above.  The patient also suffers from diabetes mellitus hyperlipidemia hypertension    MEDICATIONS:   Current Facility-Administered Medications   Medication Dose Route Frequency Provider Last Rate Last Dose   • acetaminophen (TYLENOL) tablet 1,000 mg  1,000 mg Oral Q6H PRN Mary Patel MD   1,000 mg at 08/22/19 0523   • aluminum-magnesium hydroxide-simethicone (MAALOX MAX) 400-400-40 MG/5ML suspension 15 mL  15 mL Oral Q6H PRN Viktor Lopez MD   15 mL at 08/04/19 2762   • amLODIPine (NORVASC) tablet 5 mg  5 mg Oral BID - RT Ajit Howard MD   5 mg at 08/22/19 0841   • apixaban (ELIQUIS) tablet 5 mg  5 mg Oral Q12H Anita" Ajit Jimenez MD   5 mg at 08/22/19 0839   • aspirin tablet 325 mg  325 mg Oral Daily Ajit Howard MD   325 mg at 08/22/19 0839   • atorvastatin (LIPITOR) tablet 80 mg  80 mg Oral Nightly jAit Howard MD   80 mg at 08/21/19 2124   • brimonidine (ALPHAGAN) 0.2 % ophthalmic solution 1 drop  1 drop Both Eyes BID Ajit Howard MD   1 drop at 08/22/19 0841   • dextrose (D50W) 25 g/ 50mL Intravenous Solution 25 g  25 g Intravenous Q15 Min PRN Ajit Howard MD       • dextrose (GLUTOSE) oral gel 15 g  15 g Oral Q15 Min PRN Ajit Howard MD   15 g at 08/20/19 2059   • dorzolamide (TRUSOPT) 2 % ophthalmic solution 1 drop  1 drop Both Eyes BID Ajit Howard MD   1 drop at 08/22/19 0841   • ertapenem (INVanz) 1 g/100 mL 0.9% NS VTB (mbp)  1 g Intravenous Q24H Ajit Howard  mL/hr at 08/21/19 1804 1 g at 08/21/19 1804   • glipiZIDE (GLUCOTROL) tablet 5 mg  5 mg Oral BID AC Ajit Howard MD   5 mg at 08/22/19 0839   • glucagon (human recombinant) (GLUCAGEN DIAGNOSTIC) injection 1 mg  1 mg Subcutaneous PRN Ajit Howard MD       • hydrALAZINE (APRESOLINE) tablet 50 mg  50 mg Oral Q8H Ajit Howard MD   50 mg at 08/22/19 0515   • insulin lispro (humaLOG) injection 0-7 Units  0-7 Units Subcutaneous 4x Daily With Meals & Nightly Ajit Howard MD   2 Units at 08/22/19 1152   • lansoprazole (FIRST) oral suspension 30 mg  30 mg Oral BID AC Ajit Howard MD   30 mg at 08/22/19 0515   • losartan (COZAAR) 100 mg, hydrochlorothiazide (MICROZIDE) 12.5 mg for HYZAAR 100-12.5   Oral Daily Ajit Howard MD       • metFORMIN (GLUCOPHAGE) tablet 1,000 mg  1,000 mg Oral BID With Meals Ajit Howard MD   1,000 mg at 08/22/19 0841   • nebivolol (BYSTOLIC) tablet 20 mg  20 mg Oral BID Viktor Lopez MD   20 mg at 08/22/19 0840   • nitroglycerin (NITROSTAT) SL tablet 0.4 mg  0.4 mg Sublingual Q5 Min PRN  Ajit Howard MD       • nystatin (MYCOSTATIN) 027633 UNIT/ML suspension 500,000 Units  5 mL Swish & Spit 4x Daily Ajit Howard MD   500,000 Units at 08/22/19 1153   • OLANZapine (zyPREXA) injection 5 mg  5 mg Intramuscular Q8H PRN Jaylen Haeton III, MD       • PARoxetine (PAXIL) tablet 10 mg  10 mg Oral Daily Ajit Howard MD   10 mg at 08/22/19 0841   • potassium chloride (KLOR-CON) packet 20 mEq  20 mEq Oral Daily With Breakfast Ajit Howard MD   20 mEq at 08/22/19 0842   • vitamin D (ERGOCALCIFEROL) capsule 50,000 Units  50,000 Units Oral Q7 Days Ajit Howard MD   50,000 Units at 08/22/19 1153     Field    ALLERGIES: None    FAMILY HISTORY: None reported    SOCIAL HISTORY: No reported use of alcohol tobacco or street drugs.    MENTAL STATUS EXAM: The patient is an elderly white female appearing her stated age.  She is appropriately dressed and groomed and is in her bed eating her lunch.  The patient is awake and alert.  Testing of memory cognition etc. are not possible secondary to the patient's expressive deficit.  Her vocabulary is limited to the word she and no, but these words do not seem to be expressed in relation to questions posed.    ASSETS/LIABILITIES: To be assessed    DIAGNOSTIC IMPRESSION: Dysthymic disorder, status post CVA with a aphasia, multiple medical problems described previously.    PLAN: I am in agreement with a trial of Paxil and would recommend giving this medication opportunity to exert its clinical effect over the next 2-1/2 to 3 weeks.  In the meantime, I will add PRN olanzapine to be given should the patient become agitated as was apparently the case yesterday.    Thank you very much for the opportunity to see this patient.

## 2019-08-22 NOTE — PROGRESS NOTES
Inpatient Rehabilitation Plan of Care Note    Plan of Care  Care Plan Reviewed - No updates at this time.    Psychosocial    Performed Intervention(s)  calm enviroment      Safety    Performed Intervention(s)  Bed alarm, wc alarm  Safety rounds      Body Systems    Performed Intervention(s)  Daily skin inspection    Signed by: Rogers Erickson RN

## 2019-08-22 NOTE — PLAN OF CARE
"Problem: Skin Injury Risk (Adult)  Goal: Skin Health and Integrity  Outcome: Ongoing (interventions implemented as appropriate)      Problem: Fall Risk (Adult)  Goal: Absence of Fall  Outcome: Ongoing (interventions implemented as appropriate)      Problem: Patient Care Overview  Goal: Plan of Care Review  Outcome: Ongoing (interventions implemented as appropriate)   08/22/19 0216   Patient Care Overview   IRF Plan of Care Review progress ongoing, continue   Progress, Functional Goals demonstrating adequate progress   Coping/Psychosocial   Plan of Care Reviewed With patient   OTHER   Outcome Summary Patient is cooperative. Took her HS medication in pudding without difficulty. Confused. Impulsive at times. Hard to redirect. Repeats either \"no\" or \"yes\". Patient has global aphasia. Offered snacks and fluids. Ate a few bites of a sandwich and a few sips of NTL. Sitter at bedside for safety.        Problem: Stroke (IRF) (Adult)  Goal: Promote Optimal Functional [place]  Outcome: Ongoing (interventions implemented as appropriate)        "

## 2019-08-22 NOTE — PROGRESS NOTES
Inpatient Rehabilitation Functional Measures Assessment    Functional Measures  KATI Eating:  [facility] Grooming: [facility] Bathing:  [facility] Upper Body Dressing:  [facility] Lower Body Dressing:  [facility] Toileting:  [facility] Bladder Management  Level of Assistance:  [place]  Frequency/Number of Accidents this Shift:  [facility] Bowel Management  Level of Assistance: [place]  Frequency/Number of Accidents this Shift: [facility] Bed/Chair/Wheelchair Transfer:  Bed/chair/wheelchair Transfer Score = 5.  Patient is supervision/set-up for transferring to and from the  bed/chair/wheelchair, requiring: Stand by assistance. No assistive devices were  required.  KATI Toilet Transfer:  [facility] Tub/Shower Transfer:  [place]    Previously Documented Mode of Locomotion at Discharge: Field  KATI Expected Mode of Locomotion at Discharge: [facility] Walk/Wheelchair:  WHEELCHAIR OBSERVATION   Activity was not observed.    WALK OBSERVATION   Walk Distance Scale = 3.  Distance walked is greater than 150 feet. Walk Score  = 5.  Patient requires supervision or set up for walking. Stand by assistance.  Patient walked a distance of  200 feet. No assistive devices were required.  KATI Stairs:  Stairs Score = 2.  Incidental assistance with lifting or lowering,  contact guard or steadying was provided. Patient performs 75% or more of effort  and requires minimal contact assistance. Patient negotiated  4 stairs. Patient  requires the following assistive device(s): Handrail(s).    KATI Comprehension:  [facility] Expression:  [facility] Social Interaction:  [facility] Problem Solving:  [facility] Memory:  [place]    Therapy Mode Minutes  Occupational Therapy: [place]  Physical Therapy: Individual: 30 minutes.  Speech Language Pathology:  [place]    Signed by: Marycruz Schmitt, PT

## 2019-08-22 NOTE — THERAPY PROGRESS REPORT/RE-CERT
Inpatient Rehabilitation - Physical Therapy Progress Note  [facility]     Patient Name: Darby Workman  : 1945  MRN: [id number]    Today's Date: 2019                 Admit Date: 2019      Visit Dx:    No diagnosis found.    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)   • Right-sided headache   • Acute ischemic stroke (CMS/HCC)   • Embolic stroke (CMS/HCC)   • Cerebral infarction due to stenosis of left carotid artery (CMS/HCC)   • Anticoagulated by anticoagulation treatment   • Stroke (cerebrum) (CMS/HCC)       Therapy Treatment    IRF Treatment Summary     Row Name 19 1036 19 1000 19 0830       Evaluation/Treatment Time and Intent    Subjective Information  no complaints  -  no complaints  -KB  no complaints  -KB    Existing Precautions/Restrictions  fall  -KP  fall communication, swallow  -KB  fall communication, swallow  -KB    Document Type  therapy note (daily note);progress note/recertification  -  therapy note (daily note)  -  therapy note (daily note)  -KB    Mode of Treatment  physical therapy  -KP  speech-language pathology  -KB  speech-language pathology  -KB    Patient/Family Observations  Pt arrived form SLP  -KP  seated in wc, sitter in patient room; agreeable to therapy in tx office  -  patient seated upright in wc in her room; agreeable to therapy this date, pleasant and cooperative  -KB    Start Time (Evaluation/Treatment)  --  1000  -KB  0830  -KB    Stop Time (Evaluation/Treatment)  --  1030  -KB  0900  -KB    Unable to Perform (Evaluation/Treatment)  patient unavailable Pt off floor for testing in pm.  -  --  --    Recorded by [] Marycruz Schmitt, PT [KB] Bruton, Katherine L [KB] Bruton, Katherine L    Row Name 19 1036             Cognition/Psychosocial- PT/OT     Affect/Mental Status (Cognitive)  confused;anxious  -      Behavioral Issues (Cognitive)  overwhelmed easily;difficulty managing stress  -KP      Orientation Status (Cognition)  unable/difficult to assess  -KP      Follows Commands (Cognition)  0-24% accuracy  -      Personal Safety Interventions  fall prevention program maintained;gait belt;nonskid shoes/slippers when out of bed;supervised activity  -      Cognitive Function (Cognitive)  attention deficit;safety deficit  -KP      Attention Deficit (Cognitive)  moderate deficit;distractible in noisy environment;distractible in quiet environment;divided attention;restless when unoccupied  -KP      Memory Deficit (Cognitive)  unable/difficult to assess  -KP      Safety Deficit (Cognitive)  moderate deficit;awareness of need for assistance;impulsivity;insight into deficits/self awareness  -KP      Recorded by [] Marycruz Schmitt, PT      Row Name 08/22/19 1036             Bed Mobility Assessment/Treatment    Rolling Right Whick (Bed Mobility)  supervision  -      Sit-Supine Whick (Bed Mobility)  supervision  -      Bed Mobility, Safety Issues  decreased use of arms for pushing/pulling  -      Assistive Device (Bed Mobility)  bed rails  -      Recorded by [] Marycruz Schmitt PT      Row Name 08/22/19 1036             Chair-Bed Transfer    Chair-Bed Whick (Transfers)  stand by assist;verbal cues  -      Recorded by [] Marycruz Schmitt PT      Row Name 08/22/19 1036             Sit-Stand Transfer    Sit-Stand Whick (Transfers)  stand by assist;verbal cues  -      Recorded by [] Marycruz Schmitt PT      Row Name 08/22/19 1036             Stand-Sit Transfer    Stand-Sit Whick (Transfers)  stand by assist;verbal cues  -      Recorded by [] Marycrzu Schmitt, PT      Row Name 08/22/19 1036             Gait/Stairs Assessment/Training    Whick Level (Gait)  stand by assist;contact guard  -      Distance  in Feet (Gait)  225  -KP      Pattern (Gait)  step-through  -KP      Deviations/Abnormal Patterns (Gait)  gait speed decreased;stride length decreased  -KP      Bilateral Gait Deviations  heel strike decreased;forward flexed posture  -KP      [place] Level (Stairs)  contact guard  -      Handrail Location (Stairs)  right side (ascending)  -KP      Number of Steps (Stairs)  4  -KP      Ascending Technique (Stairs)  step-to-step  -KP      Descending Technique (Stairs)  step-to-step  -KP      Stairs, Safety Issues  weight-shifting ability decreased;sequencing ability decreased  -KP      Stairs, Impairments  strength decreased;sensation decreased;impaired balance  -      Comment (Gait/Stairs)  carpets, turns, unlevel  -      Recorded by [KP] Marycruz Schmitt, PT      Row Name 08/22/19 1036             Safety Issues, Functional Mobility    Safety Issues Affecting Function (Mobility)  ability to follow commands;insight into deficits/self awareness  -      Impairments Affecting Function (Mobility)  balance;cognition;endurance/activity tolerance;pain;shortness of breath;strength  -      Comment, Safety Issues/Impairments (Mobility)  poor insight, confursion limits therapies  -KP      Recorded by [KP] Marycruz Schmitt, PT      Row Name 08/22/19 1000 08/22/19 0830          Pain Scale: Numbers Pre/Post-Treatment    Pain Scale: Numbers, Pretreatment  0/10 - no pain  -KB  0/10 - no pain  -KB     Pain Scale: Numbers, Post-Treatment  0/10 - no pain  -KB  0/10 - no pain  -KB     Recorded by [KB] Bruton, Katherine L [KB] Bruton, Katherine L     Row Name 08/22/19 1036             Pain Scale: FACES Pre/Post-Treatment    Pain: FACES Scale, Pretreatment  0-->no hurt  -KP      Pain: FACES Scale, Post-Treatment  0-->no hurt  -KP      Recorded by [KP] Marycruz Schmitt, PT      Row Name 08/22/19 1036             Aerobic Exercise Activity    Exercise Performed (Aerobic, Therapeutic Exercise)  recumbent elliptical   -       Comment (Aerobic, Therapeutic Exercise)  S8, A8, W3  -KP      Recorded by [] Marycruz Schmitt, PT      Row Name 08/22/19 1036             Lower Extremity Standing Therapeutic Exercise    Performed, Standing Lower Extremity (Therapeutic Exercise)  hip flexion/extension;hip abduction/adduction;hip/knee flexion/extension;heel raises  -KP      Device, Standing Lower Extremity (Therapeutic Exercise)  neftali bars  -KP      Sets/Reps Detail, Standing Lower Extremity (Therapeutic Exercise)  1/10  -KP      Comment, Standing Lower Extremity (Therapeutic Exercise)  VC for counting, technique  -KP      Recorded by [KP] Marycruz Schmitt, PT      Row Name 08/22/19 1036             Positioning and Restraints    Pre-Treatment Position  sitting in chair/recliner  -KP      Post Treatment Position  wheelchair  -KP      In Wheelchair  sitting;encouraged to call for assist;notified nsg;patient within staff view  -KP      Recorded by [KP] Marycruz Schmitt PT      Row Name 08/22/19 1036             Weekly Summary of Progress (PT)    Weekly Outcome Summary: Physical Therapy  Pt continues to require SBA for mobility due to cognitive concerns.  Pt with poor insight into deficits, poor follow through with safety.  Pt Indep is limited by confusion and cognition.    -KP      Recorded by [KP] Marycruz Schmitt PT        User Key  (r) = Recorded By, (t) = Taken By, (c) = Cosigned By    Initials Name Effective Dates     Marycruz Schmitt, PT 04/03/18 -     KB Bruton, Katherine L 03/07/18 -         Wound 07/17/19 1015 Left neck incision (Active)   Dressing Appearance open to air 8/22/2019  8:40 AM   Closure Liquid skin adhesive 8/22/2019  8:40 AM   Base dry;clean 8/22/2019  8:40 AM   Periwound intact;dry 8/21/2019  7:05 PM   Periwound Temperature warm 8/21/2019  7:05 PM   Drainage Amount none 8/22/2019  8:40 AM   Dressing Care, Wound open to air 8/21/2019  7:05 PM     Physical Therapy Education     Title: PT OT SLP Therapies (Not Started)      Topic: Physical Therapy (In Progress)     Point: Mobility training (In Progress)     Learning Progress Summary           Patient Acceptance, E,D, NR by JK at 8/20/2019  1:50 PM    Acceptance, E, NR,NL by JHONNY at 8/19/2019 11:08 PM    Acceptance, E,TB, NR by KELBY at 8/19/2019 12:11 PM    Nonacceptance, E,D, NR by DONI at 8/17/2019 11:44 AM    Acceptance, E,TB, VU,NR by KELBY at 8/14/2019  3:15 PM    Nonacceptance, E,TB,D, NR by ENID at 8/5/2019 12:00 PM    Acceptance, E,D, NR by NESTOR at 8/3/2019  1:35 PM    Acceptance, D, DU,NR by NESTOR at 8/3/2019  8:56 AM                   Point: Home exercise program (In Progress)     Learning Progress Summary           Patient Acceptance, E, NR,NL by JOHNNY at 8/19/2019 11:08 PM    Nonacceptance, E,D, NR by DONI at 8/17/2019 11:44 AM    Nonacceptance, E,TB,D, NR by ENID at 8/5/2019 12:00 PM                   Point: Body mechanics (In Progress)     Learning Progress Summary           Patient Acceptance, E, NR,NL by JHONNY at 8/19/2019 11:08 PM    Nonacceptance, E,D, NR by DONI at 8/17/2019 11:44 AM                   Point: Precautions (In Progress)     Learning Progress Summary           Patient Acceptance, E, NR,NL by JHONNY at 8/19/2019 11:08 PM    Nonacceptance, E,D, NR by DONI at 8/17/2019 11:44 AM    Acceptance, E, NR by MD at 8/16/2019  8:36 AM    Acceptance, E, NR by MD at 8/15/2019  8:59 AM    Acceptance, E, NR by MD at 8/13/2019 11:14 AM    Acceptance, E, NR by MD at 8/12/2019 10:45 AM    Acceptance, E, NR by MD at 8/10/2019 11:15 AM    Acceptance, E, NR by MD at 8/9/2019 11:12 AM    Acceptance, E, NR by MD at 8/8/2019 11:48 AM    Acceptance, E, NR by MD at 8/6/2019 10:41 AM    Acceptance, E, NR by MD at 8/2/2019 10:44 AM    Acceptance, ANA PAULA BALDWIN, NR by MD at 8/1/2019 12:16 PM                               User Key     Initials Effective Dates Name Provider Type Discipline    KELBY 06/08/18 -  Poppy Rosa, PT Physical Therapist PT    JS 04/06/17 -  Steph Salinas, PT Physical Therapist PT    NESTOR 04/03/18 -   Nicole Austin, PT Physical Therapist PT    MD 04/03/18 -  Mira Chadwick, PT Physical Therapist PT    KP 04/03/18 -  Marycruz Schmitt, PT Physical Therapist PT    WN 06/24/19 -  Luis Abernathy RN Registered Nurse Nurse                  PT Recommendation and Plan               PT IRF GOALS     Row Name 08/22/19 1036             Bed Mobility Goal 1 (PT-IRF)    Activity/Assistive Device (Bed Mobility Goal 1, PT-IRF)  sit to supine/supine to sit  -KP      [place] Level (Bed Mobility Goal 1, PT-IRF)  contact guard assist  -KP      Time Frame (Bed Mobility Goal 1, PT-IRF)  2 weeks  -KP      Progress/Outcomes (Bed Mobility Goal 1, PT-IRF)  goal met  -KP         Transfer Goal 1 (PT-IRF)    Activity/Assistive Device (Transfer Goal 1, PT-IRF)  sit-to-stand/stand-to-sit  -KP      [place] Level (Transfer Goal 1, PT-IRF)  standby assist  -KP      Time Frame (Transfer Goal 1, PT-IRF)  2 weeks  -KP      Progress/Outcomes (Transfer Goal 1, PT-IRF)  goal met  -KP         Transfer Goal 2 (PT-IRF)    Activity/Assistive Device (Transfer Goal 2, PT-IRF)  car transfer  -KP      [place] Level (Transfer Goal 2, PT-IRF)  supervision required  -KP      Time Frame (Transfer Goal 2, PT-IRF)  2 days  -KP      Progress/Outcomes (Transfer Goal 2, PT-IRF)  goal ongoing requires VC  -KP         Gait/Walking Locomotion Goal 1 (PT-IRF)    Activity/Assistive Device (Gait/Walking Locomotion Goal 1, PT-IRF)  gait (walking locomotion)  -KP      Gait/Walking Locomotion Distance Goal 1 (PT-IRF)  200  -KP      [place] Level (Gait/Walking Locomotion Goal 1, PT-IRF)  standby assist;contact guard assist  -KP      Time Frame (Gait/Walking Locomotion Goal 1, PT-IRF)  2 weeks  -KP      Barriers (Gait/Walking Locomotion Goal 1, PT-IRF)  Unable to progress beyond due to cognitive issues.  -KP      Progress/Outcomes (Gait/Walking Locomotion Goal 1, PT-IRF)  goal met  -KP        User Key  (r) = Recorded By, (t) = Taken By, (c) = Cosigned By     Initials Name Provider Type    Marycruz Norwood PT Physical Therapist               Time Calculation:     PT Charges     Row Name 08/22/19 1045             Time Calculation    Start Time  1030  -      Stop Time  1100  -      Time Calculation (min)  30 min  -      PT Received On  08/22/19  -      PT - Next Appointment  08/23/19  -      PT Goal Re-Cert Due Date  08/29/19  -        User Key  (r) = Recorded By, (t) = Taken By, (c) = Cosigned By    Initials Name Provider Type    Marycruz Norwood PT Physical Therapist          Therapy Charges for Today     Code Description Service Date Service Provider Modifiers Qty    [id number] HC PT THER PROC EA 15 MIN 8/22/2019 Marycruz Schmitt, PT  2                   Marycruz Schmitt, HESHAM  8/22/2019

## 2019-08-22 NOTE — PROGRESS NOTES
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  Continues with improved strength on the right side.  Patient did agree to take medications last evening.  She is cooperative with therapies so far today although still with anxiety component, frustration from her aphasia.  Will still be resistive to some activities.  Perseverates on \"no\".  Indicates she is not having any headache.  Has a sitter at bedside.  Did not have great intake with breakfast this morning.      History taken from: patient chart RN    Objective     Vital Signs  Temp:  [98.1 °F (36.7 °C)-98.6 °F (37 °C)] 98.6 °F (37 °C)  Heart Rate:  [65-89] 65  Resp:  [16] 16  BP: (150-162)/(69-90) 162/69    Objective  Physical Exam  MENTAL STATUS -  AWAKE / ALERT.  Anxious but less so than yesterday  HEENT-    LUNGS - normal respirations.   Clear to auscultation  HEART- regular  ABD -   normoactive bowel sounds.  Soft nontender.  EXT - no edema  NEURO -alert.  Aphasia.  Perseverates on \"no\"      MOTOR EXAM -takes resistance in the bilateral upper extremities and lower extremities         Results Review:     I reviewed the patient's new clinical results.     CT HEAD - AUGUST 11, 2019  FINDINGS:  Old appearing lacunar type infarcts are again noted about the  right thalamus and basal ganglia regions. There are stable areas of  encephalomalacia in the left parietal and occipital lobes " medially.  Generalized atrophy. There are moderately extensive scattered areas of  decreased density in the white matter likely related to chronic ischemic  gliotic changes.    No hydrocephalus.    Glucose   Date/Time Value Ref Range Status   08/22/2019 1111 175 (H) 70 - 130 mg/dL Final   08/22/2019 0721 184 (H) 70 - 130 mg/dL Final   08/21/2019 2029 149 (H) 70 - 130 mg/dL Final   08/21/2019 0729 153 (H) 70 - 130 mg/dL Final   08/20/2019 2118 139 (H) 70 - 130 mg/dL Final   08/20/2019 2054 65 (L) 70 - 130 mg/dL Final   08/20/2019 1559 123 70 - 130 mg/dL Final   08/20/2019 1053 148 (H) 70 - 130 mg/dL Final     Results from last 7 days   Lab Units 08/21/19  0605 08/19/19  0739 08/16/19  0710   WBC 10*3/mm3 4.86 5.32 5.24   HEMOGLOBIN g/dL 9.8* 10.3* 9.8*   HEMATOCRIT % 30.6* 33.7* 32.4*   PLATELETS 10*3/mm3 324 382 275       Ref. Range 5/22/2019 05:44 5/22/2019 11:34 7/9/2019 08:25 7/18/2019 04:49 7/19/2019 05:05 7/23/2019 05:56 8/1/2019 06:48   Hemoglobin Latest Ref Range: 12.0 - 15.9 g/dL 10.8 (L)  10.6 (L) 8.5 (L) 9.7 (L) 9.3 (L) 7.4 (L)   Hematocrit Latest Ref Range: 34.0 - 46.6 % 35.1  33.4 (L) 26.2 (L) 29.9 (L) 28.6 (L) 23.6 (L)     Results from last 7 days   Lab Units 08/21/19  0605 08/19/19  0739 08/16/19  0710   SODIUM mmol/L 138 137 140   POTASSIUM mmol/L 3.5 3.8 3.8   CHLORIDE mmol/L 99 101 103   CO2 mmol/L 28.1 27.3 27.2   BUN mg/dL 19 12 17   CREATININE mg/dL 0.65 0.63 0.64   CALCIUM mg/dL 8.8 8.9 8.7   GLUCOSE mg/dL 117* 168* 137*         Ref. Range 8/1/2019 06:47   25 Hydroxy, Vitamin D Latest Ref Range: 30.0 - 100.0 ng/ml 21.8 (L)       Ref. Range 8/1/2019 06:47   Total Cholesterol Latest Ref Range: 0 - 200 mg/dL 105   HDL Cholesterol Latest Ref Range: 40 - 60 mg/dL 40   LDL Cholesterol  Latest Ref Range: 0 - 100 mg/dL 57   VLDL Cholesterol Latest Ref Range: 5 - 40 mg/dL 8   Triglycerides Latest Ref Range: 0 - 150 mg/dL 40   Medication Review: done  Scheduled Meds:    amLODIPine 5 mg Oral BID - RT    apixaban 5 mg Oral Q12H   aspirin 325 mg Oral Daily   atorvastatin 80 mg Oral Nightly   brimonidine 1 drop Both Eyes BID   dorzolamide 1 drop Both Eyes BID   ertapenem 1 g Intravenous Q24H   glipiZIDE 5 mg Oral BID AC   hydrALAZINE 50 mg Oral Q8H   insulin lispro 0-7 Units Subcutaneous 4x Daily With Meals & Nightly   lansoprazole 30 mg Oral BID AC   losartan-HCTZ (HYZAAR) 100-12.5 combo dose  Oral Daily   metFORMIN 1,000 mg Oral BID With Meals   nebivolol 20 mg Oral BID   nystatin 5 mL Swish & Spit 4x Daily   PARoxetine 10 mg Oral Daily   potassium chloride 20 mEq Oral Daily With Breakfast   vitamin D 50,000 Units Oral Q7 Days     Continuous Infusions:   PRN Meds:.•  acetaminophen  •  aluminum-magnesium hydroxide-simethicone  •  dextrose  •  dextrose  •  glucagon (human recombinant)  •  nitroglycerin  •  OLANZapine      Assessment/Plan       Stroke (cerebrum) (CMS/HCC)      Assessment & Plan  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  If add Namenda, will probably discontinue amantadine as she continues alert.  August 11-she has some increased irritability at times.  This may be related to the underlying stroke deficits itself.  She does not appear to have any dysuria, no odor to her urine.  Staff reports that for the most part she is eating okay.  Will check a follow-up CT of the head to assess for any interval visual changes.  She is on aspirin and Eliquis for stroke prophylaxis.  She had noticeably improved alertness when amantadine was started.  She is readily alert now.  This may be related to natural recovery in terms of her level of arousal at this point.  Current plan is to discontinue the amantadine to see if that helps with her irritability and start  on Namenda for aphasia.  August 12 -  Patient with increased restlessness  at times.   Continues aphasia. Not able to identify any complaint.  Appears anxious today. No change on CT head yesterday. Started on Namenda for aphasia on 8/12/19.   August 13- will continue to monitor on recent med adjustments   August 15-She continues with expressive language deficits.    Again appears anxious related to her aphasia and difficulty expressing herself.    She will be able to get occasional single word for the examiner.  She does follow instructions.  The patient was reviewed with .  Discussed adding on an antidepressant with anxiolytic properties -Paxil-as it appears frustration from her aphasia with associated anxiety with the frustration affects her performance.  We will plan on starting Paxil 10 mg daily tomorrow and monitor response.  Reviewed with the patient's  that would not add a short acting anxiolytic (such as a benzodiazepine or Seroquel) as it may affect her cognitive performance.    August 16-started Paxil 10 mg daily  August 19 - Increased restlessness Friday night - ? Related to UTI vs initiation of Paxil. On Cefdinir for GNR pending ID &Sensitivity.  August 20 -urine culture with E. coli ESBL.  Given her aphasia not able to obtain information regarding dysuria.  As the urinalysis was ordered as part of work-up for increased restlessness this weekend, would have to treat as symptomatic although could be asymptomatic bacteriuria.  Will place on ertapenem 1 g IV daily for 3-5 days.  August 21 - increased restlessness/anxiety today. Labs without any acute change. Blood glucose okay this AM. BP pattern okay. No gross motor changes. Follow on current regimen for now.   Addendum-Patient continued today with  increased restlessness/anxiety/refusing aspects of nursing care. She had had a good day in therapies yesterday. She is calmer now with family present. Did allow IV antibiotic to be infused but has not taken PO meds or eaten dinner yet.   Discussed with patient's  "/children will need to hold on planned discharge tomorrow given her mood/behavior today.   I do not feel Namenda (started as trial for aphasia) is related as had episodes of restlessness prior to starting, but will discontinue for now so  slate with psychoactive medications. Continue Paxil for now. Will consult Psychiatry for input.  August 22-patient more cooperative today, taking medications and will participate in therapies.  Still perseverates on \"no\".  Still with some anxiety related to her aphasia but less than yesterday.  Seen by psychiatry and to continue with Paxil 10 mg daily.  Also order written for Zyprexa 5 mg IM every 8 hours as needed agitation.  To observe on current regimen.    Aphasia -  August 12 - trial of Namenda  August 16-continue to observe on Namenda 5 mg daily for her aphasia and may possibly titrate up next week  August 20-titrate up on Namenda to 5 mg twice a day  August 22-discontinued Namenda.  Do not feel related to her anxiety/restlessness as it was present prior to starting but discontinued to avoid any additional psychoactive medications that might add to it.  Had not seen any improvement in her aphasia with the very brief trial.    Dysphagia-Cortrak feeding tube discontinued on July 31 and started on puréed/honey thick liquid diet with strategies  August 7-advance to fork mash nectar thick liquid diet, consistent carbohydrate.  August 14 - repeat VFSS to assess for advancing to thin liquids  August 15-Video fluoroscopic swallow study today-mechanical soft, no mixed consistency, nectar thick liquid, water protocol between meals, no straws.    History of multiple bilateral strokes and left central retinal artery occlusion    History of left greater than right internal carotid artery stenosis-status post left internal carotid artery stent July 17, 2019    History of hypertension -  Hyzaar 100-25. August 4 - Bystolic increased to 20 mg daily to 20 mg bid.  August 8 - BP " still runs high. On discharge in May 2019 was on Hyzaar 100-25 mg daily, Bystolic 20 mg daily, amlodipine 10 mg daily, Hydralazine 50 mg q 8 hours.  Will add Hydralazine initially 10 mg po q 8 hours and titrate up as needed.   August 9-systolic blood pressure elevated last night and early this morning but better this afternoon at 122-130.  Continue to follow recent addition of hydralazine and titrate up as needed  August 10-systolic blood pressure 175 this afternoon, range otherwise 122-142.  Will titrate up on hydralazine to 20 mg every 8 hours.  August 11-hypertension overall appears improved.  Will titrate up further to 25 mg every 8 hours.  August 12 - add amlodipine 5 mg daily.   August 14 - titrate up on hydralazine to 50 mg q 8 hours  August 15-blood pressure improved this afternoon with systolic blood pressure in the 120s-follow on current regimen  August 16-blood pressure range 110////59-will continue to monitor on present regimen  August 19 - /74 early AM, later 136/71 - increase amlodipine to 5 mg bid    History of diabetes mellitus -Lantus/glipizide (home medication metformin 1000 mg twice daily and glipizide 10 mg twice daily)  August 1-blood sugars were low yesterday afternoon after tube feeds discontinued.  Held glipizide last night and this morning and Lantus last night.  Blood sugar elevated at noontime today.  Will receive resume glipizide at a lower dose of 5 mg twice a day with meals.  Continue sliding scale insulin coverage.  She also is on metformin at home.  August 5-add back metformin initially at 500 mg twice a day and continue glipizide 5 mg twice a day, both one half of previous home dose, titrate up as needed.  August 7-titrate up metformin to 850 mg twice a day.  Add consistent carbohydrate restriction to diet.  August 9-increase metformin to 1000 g twice a day.  August 10-blood glucose 340 last evening but 150-114-178 so far today  August 11-continue to follow  blood sugar pattern and may increase glipizide further as current dose glipizide 5 mg twice a day along with metformin 1000 mg twice a day  August 14 - blood glucose  past 24 hours - monitor pattern.  August 15-blood glucose [phone]--[phone]-continue to follow pattern.  Will change sliding scale insulin coverage to Humalog at a lower dose.  She was started on the regular insulin sliding scale when she was on tube feeds.  August 16 - recent blood glucose -768-138  August 19 - recent blood glucose [phone] - increase glipizide to 7.5 mg bid  August 21 - refused blood glucose check today. Will decrease glipizide back to 5 mg bid to avoid potential for hypoglycemia until allow blood glucose check.     Stroke prophylaxis-aspirin/Eliquis/atorvastatin    Anemia-August 1-hemoglobin 7.4.  Most recent check on July 23 HGB 9.3.  Will recheck hemoglobin this afternoon.  Hemoccult stool.  Iron studies.  Reticulocyte count.  Recheck CBC in the a.m.  Added Protonix.  Patient is on aspirin and Eliquis.  With recent stroke  will look to transfuse packed red blood cell.  August 2-hemoglobin improved 9.0-1 unit packed red blood cells.  Elevated reticulocyte count in response to anemia.  Nursing describes dark stool.  Patient discussed with gastroenterology.  At this point unable to do endoscopy as on Eliquis and aspirin.  Will treat symptomatically for now with increasing proton pump inhibitor and transfusing as needed.  August 5-anemia improved-hemoglobin 9.8  August 16-hemoglobin unchanged 9.8    DVT prophylaxis-SCDs/anticoagulation    Impulsivity-   Nursing to do re-orientation with the patient.  Room close to the nursing station.    Endocrine-vitamin D deficiency-ergocalciferol 50,000 units weekly x8 weeks added. Vitamin B12 level and TSH checked in late May unremarkable    Urinary tract infection-August 20 -urine culture with E. coli ESBL.  Given her aphasia not able to obtain information regarding  dysuria.  As the urinalysis was ordered as part of work-up for increased restlessness this weekend, would have to treat as symptomatic although could be asymptomatic bacteriuria.  Will place on ertapenem 1 g IV daily for 3-5 days.     Impaired cognition/impaired language, impaired swallow, impaired activity daily living, impaired mobility     Now admit for comprehensive acute inpatient rehabilitation .  This would be an interdisciplinary program with physical therapy 1 hour,  occupational therapy 1 hour, and speech therapy 1 hour, 5 days a week.  Rehabilitation nursing for carryover, monitoring of cardiopulmonary and neurologic   status, bowel and bladder, and skin  Ongoing physician follow-up.  Weekly team conferences.  Goals are indeterminant.   Rehabilitation prognosis determined.  Medical prognosis determined.  Estimated length of stay is indeterminate.    TEAM CONF - AUGUST 6- TRANFERS CTG. GAIT UP  FEET CTG-MIN ASSIST. UBD MIN. LBD MOD. BATH MOD. SEVERE APHASIA. INCREASED RESISTANCE TO PARTICIPATE AT TIMES.   PUREE/HTL.  GOOD PO INTAKE. ADJUSTING MEDS FOR DIABETES MELLITUS.  BLADDER CONTINENT/INCONTIENT.   ELOS- 2 WEEKS.     TEAM CONF - AUGUST 13 - DID GREAT WITH PT ON Saturday, FOLLOWING COMMANDS AND BATTING A BALL WITH ANOTHER PATIENT. YESTERDAY, VERY FRUSTRATED AND IRRITABLE.  FRUSTRATED BY APHASIA, TRYING TO TELL STAFF SOMETHING. WILL HOLD ON TO OBJECTS, REPEAT SINGLE WORD.   BED CTG. 4 STAIRS CTG.  GAIT 220 FEET CTG-SBA NO DEVICE.  TOILET TRANSFERS CTG-MIN.  GROOMING MIN.  UBD MIN ASSIST FOR BRA, LBD CTG-MIN. BATH CTG-MIN. COORDINATION RUE BETTER.  DYSPHAGIA - IMPROVED - FORK TALA NTL. DO NOT FEEL SHE WOULD TOLERATE E-STIM. RECEPTIVE LANGUAGE IMPROVED SLIGHTLY , POINTING TO OBJECTS. EXPRESSIVELY PERSEVERATIVE ON A SINGLE WORD, TRIES TO USE PICTURE BOARD TO COMMUNICATE. YES/NO NOT ACCURATE.  CONTINENT BOWEL AND BLADDER, TIMED VOIDS. SKIN INTACT. TYPICALLY GOOD INTAKE.  ELOS - 1.5 WEEKS    AUGUST 14  - In therapies - In OT, increased participation noted with  present   Transfers SBA/CTG  Walked from therapy gym to room without AD SBA and vc's for directions back to the room   300' outdoors on sidewalk, incline; 300', 180', 100' up on rehab unit. Pt was able to find her room when she got close to it  Bathing, dressing, grooming min assist. Toileting moderate assist.  Pt participated in using R hand to manipualte round pegs into peg board by color with MIN difficutly once pt caught on to the task. with 3D block recreation with R hand with 100% color match, 80% accuracy with block recreation  With SLP, patient was not able to effectively communicate her wants/needs but refused to go to therapy office by planting her heels while rolling in wc down the [place]; tx session completed in her room     August 15-The patient was reviewed with .  Discussed adding on an antidepressant with anxiolytic properties -Paxil-as it appears frustration from her aphasia with associated anxiety with the frustration affects her performance.  We will plan on starting Paxil 10 mg daily tomorrow and monitor response.  Reviewed with the patient's  that would not add a short acting anxiolytic (such as a benzodiazepine or Seroquel) as it may affect her cognitive performance.    August 16-Patient was reviewed with her daughter today including rehabilitation goals, discharge planning, and recent medication adjustment to try to help with her anxiety/frustration with her aphasia.  Reviewed with the daughter that on discussion with the team is felt that she would do better with her functional status/aphasia/anxiety if able to be discharged to home environment with more frequent interactions but if that is not feasible with the need to look at subacute options.    TEAM CONF - AUGUST 20 - GAIT CTG- FEET. NO DEVICE, WALKS TO AND FROM GYM.  ADLS CTG WITH CUING.  PLAYED ENTIRE GAME OF CHECKERS YESTERDAY. VARIABLE PERFORMANCE  WITH SPEECH THERAPY 20-90% MATCHING WORDS TO PICTURES.    MECH SOFT, GROUND MEAT, NECTAR THICK LIQUIDS.   TYPICALLY CONTINENT BLADDER BUT INCONTINENT BOWEL. DID FINE LAST EVENING PER SISTER AND DID NOT TRY TO GET UP- WILL DISCONTINUE SITTER. IN ROOM CLOSE TO NURSING STATION.   [facility]- FAMILY CONF LAST WEEK- WILL WORK TOWARD SUBACUTE PLACEMENT      Barrier to discharge includes  Impaired cognitive-linguistic skills - work on receptive and expressive language and cognition.       Ajit Howard MD  08/22/19  1:53 PM    Time:

## 2019-08-22 NOTE — THERAPY TREATMENT NOTE
Inpatient Rehabilitation - Speech Language Pathology Treatment Note    [facility]       Patient Name: Darby Workman  : 1945  MRN: [id number]    Today's Date: 2019           Admit Date: 2019      Visit Dx:      No diagnosis found.    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)   • Right-sided headache   • Acute ischemic stroke (CMS/HCC)   • Embolic stroke (CMS/HCC)   • Cerebral infarction due to stenosis of left carotid artery (CMS/HCC)   • Anticoagulated by anticoagulation treatment   • Stroke (cerebrum) (CMS/HCC)          Therapy Treatment    Evaluation/Coping    Evaluation/Treatment Time and Intent  Subjective Information: no complaints (19 1000 : Bruton, Katherine L)  Existing Precautions/Restrictions: fall(communication, swallow) (19 1000 : Bruton, Katherine L)  Document Type: therapy note (daily note) (19 1000 : Bruton, Katherine L)  Mode of Treatment: speech-language pathology (19 1000 : Bruton, Katherine L)  Patient/Family Observations: seated in wc, sitter in patient room; agreeable to therapy in tx office (19 1000 : Bruton, Katherine L)  Start Time (Evaluation/Treatment): 1000 (19 1000 : Bruton, Katherine L)  Stop Time (Evaluation/Treatment): 1030 (19 1000 : Bruton, Katherine L)    Vitals/Pain/Safety    Pain Scale: Numbers Pre/Post-Treatment  Pain Scale: Numbers, Pretreatment: 0/10 - no pain (19 1000 : Bruton, Katherine L)  Pain Scale: Numbers, Post-Treatment: 0/10 - no pain (19 1000 : Bruton, Katherine L)    EDUCATION    The patient has been educated in the following areas:     Communication Impairment.    SLP GOALS     Row Name 19 1000 19 0830 19 1100       Words/Phrases/Sentences Goal 1 (SLP)    Progress (Ability to Contruct  "Words/Phrases/Sentences Goal 1, SLP)  independently (over 90% accuracy)  -KB  with moderate cues (50-74%)  -KB  --    Progress/Outcomes (Identify Objects and Pictures Goal 1, SLP)  goal ongoing  -KB  goal ongoing  -KB  --    Comment (Words/Phrases/Sentences Goal 1, SLP)  70% identifying named body parts, dirent modeling not effective to increase performance  -KB  16% identifying named letter/number in fo5, 83% when choices reduced to fo2-3  -KB  --       Comprehend Questions Goal 1 (SLP)    Progress/Outcomes (Comprehend Questions Goal 1, SLP)  --  --  goal ongoing  -KB    Comment (Comprehend Questions Goal 1, SLP)  --  --  responded \"no\" to all yes/no questions asked about her family with visual picture book left by a family member  -KB       Reading Comprehension at Word Level Goal 1 (SLP)    Progress (Reading Comprehension at Word Level Goal 1, SLP)  independently (over 90% accuracy)  -KB  with moderate cues (50-74%)  -KB  --    Progress/Outcomes (Reading Comprehension at Word Level Goal 1, SLP)  good progress toward goal;goal ongoing  -KB  goal ongoing  -KB  --    Comment (Reading Comprehension at Word Level Goal 1, SLP)  80% identifying written word in fo2 given dictated word  -KB  67% matching written word to picture, 78% with verbal cues  -KB  --       Word Retrieval Skills Goal 1 (SLP)    Progress (Word Retrieval Skills Goal 1, SLP)  with moderate cues (50-74%)  -KB  --  --    Progress/Outcomes (Word Retrieval Goal 1, SLP)  goal ongoing  -KB  --  goal ongoing  -KB    Comment (Word Retrieval Goal 1, SLP)  20% predictable phrase completion with moderate verbal cues  -KB  --  Verbal output consistent of perseverative \"yes/no\" responses to all attempts at communicating. She mostly responded \"no\" except for responding to a few questions regarding immediate environment. Yes responses did not appear appropriate, as patient would then refuse having task carried out (i.e. stated \"yea\" when asked if she wanted her TV " on, but would not allow assistance with remote).   -KB       Word Retrieval Skills Goal 2 (SLP)    Progress (Word Retrieval Skills Goal 2, SLP)  --  with 1:1 supervision/constant cues  -KB  --    Progress/Outcomes (Word Retrieval Goal 2, SLP)  --  goal ongoing  -KB  --    Comment (Word Retrieval Goal 2, SLP)  --  20%, 30%, 20% counting 1-10 with verbal model and visual support  -KB  --       Graphic Expression of Shapes, Letters, Numbers Goal 1 (SLP)    Progress (Graphic Expression of Shapes, Letters, and Numbers Goal 1, SLP)  independently (over 90% accuracy)  -KB  --  --    Progress/Outcomes (Graphic Expression of Shapes, Letters, and Numbers Goal 1, SLP)  goal ongoing  -KB  --  --    Comment (Graphic Expression of Shapes, Letters, and Numbers Goal 1, SLP)  60% writing dictated letters  -KB  --  --       Graphic Expression of Words Goal 1 (SLP)    Progress (Graphic Expression of Single Words Goal 1, SLP)  --  with minimal cues (75-90%);with moderate cues (50-74%)  -KB  --    Progress/Outcomes (Graphic Expression of Single Words Goal 1, SLP)  --  good progress toward goal;goal ongoing  -KB  --    Comment (Graphic Expression of Single Words Goal 1, SLP)  --  65% accurate letter formation when copying written words; 90% writing her name to written prompt  -KB  --       Planning and Execution of Connected Speech Goal 1 (SLP)    Progress (Planning and Execution of Connected Speech Goal 1, SLP)  with moderate cues (50-74%)  -KB  --  --    Progress/Outcomes (Planning and Execution of Connected Speech Goal 1, SLP)  goal ongoing  -KB  --  --    Comment (Planning and Execution of Connected Speech Goal 1, SLP)  60% imitation of functional CV words, 90% with additional repetitions and visual cues  -KB  --  --       Augmentative/Alternative Communication Objectives Goal 1 (SLP    Progress/Outcomes (Augmentative/Alternative Communication Goal 1, SLP)  --  --  goal ongoing  -KB    Comment (Augmentative/Alternative Communication  "Goal 1, SLP)  --  --  basic picture communication board utilized to offer patient to be taken to the bathroom and to be taken to lunch; she responded \"no\" to both attempts  -KB       Additional Goal 1 (SLP)    Barriers (Additional Goal 1, SLP)  100% matching like 3-digit number in fo4  -KB  100% filling in blanks given written numbers 1-10 and some missig numbers  -KB  --    Row Name 08/21/19 0900 08/20/19 1100 08/20/19 0900       Words/Phrases/Sentences Goal 1 (SLP)    Progress (Ability to Contruct Words/Phrases/Sentences Goal 1, SLP)  --  with moderate cues (50-74%)  -KB  independently (over 90% accuracy)  -KB    Progress/Outcomes (Identify Objects and Pictures Goal 1, SLP)  goal ongoing  -KB  good progress toward goal;goal ongoing  -KB  good progress toward goal;goal ongoing  -KB    Comment (Words/Phrases/Sentences Goal 1, SLP)  Attempted basic object identification by naming objects around the room. When asked if patient wanted TV turned on, she pointed her remote toward the television but would not accept assistance to turn on TV. She did not identify other named object attempted (x5).  -KB  60% identifying body parts by name, 80% with model  -KB  96% identifying pictured objects in fo2 by name  -KB       Comprehend Questions Goal 1 (SLP)    Progress/Outcomes (Comprehend Questions Goal 1, SLP)  goal ongoing  -KB  goal ongoing  -KB  --    Comment (Comprehend Questions Goal 1, SLP)  yes/no questions related to immediate wants/needs asked throughout session, patient perseverated on \"no\" response  -KB  answered basic yes/no questions appropriately x3 in a row then did not respond to next 2 presented questions  -KB  --       Reading Comprehension at Word Level Goal 1 (SLP)    Improve Reading Comprehension at Word Level Goal 1 (SLP)  --  --  match object to word;matching picture to word;matching written word to dictated word;90%;independently (over 90% accuracy)  -KB    Time Frame (Reading Comprehension at Word Level " "Goal 1, SLP)  --  --  by discharge  -KB    Progress (Reading Comprehension at Word Level Goal 1, SLP)  --  with minimal cues (75-90%)  -KB  with moderate cues (50-74%)  -KB    Progress/Outcomes (Reading Comprehension at Word Level Goal 1, SLP)  goal ongoing  -KB  good progress toward goal;goal ongoing  -KB  good progress toward goal;goal ongoing  -KB    Comment (Reading Comprehension at Word Level Goal 1, SLP)  Attempted worksheet matching written words to pictures. She was not able to sustain attention adequately to complete task, as she appeared distracted by her frustration.   -KB  80% match written word to pictured object in fo2  -KB  74% matching written word in fo2 to dictated word  -KB       Word Retrieval Skills Goal 1 (SLP)    Progress (Word Retrieval Skills Goal 1, SLP)  --  with maximum cues (25-49%)  -KB  with maximum cues (25-49%)  -KB    Progress/Outcomes (Word Retrieval Goal 1, SLP)  goal ongoing  -KB  goal ongoing  -KB  goal ongoing  -KB    Comment (Word Retrieval Goal 1, SLP)  Verbal output consistent of perseverative \"no\" except when asked if I could sit her up in bed, if she wanted her family, and if she wanted the TV on. She responded \"yea\" to these questions.   -KB  attempted to elicit automatic speech by singing common short songs in unision- patient was able to imitate melodies accurately but produce lyrics accurately in 10% or less of utterances across 3 songs, 2 attempts per song  -KB  0%, 60%, 40% counting 1-10 with visual cues and verbal model across 3 trials respectively  -KB       Graphic Expression of Words Goal 1 (SLP)    Progress (Graphic Expression of Single Words Goal 1, SLP)  --  --  with minimal cues (75-90%)  -KB    Progress/Outcomes (Graphic Expression of Single Words Goal 1, SLP)  --  --  good progress toward goal;goal ongoing  -KB    Comment (Graphic Expression of Single Words Goal 1, SLP)  --  --  wrote her name accurately given written prompt and written first letter  -KB    "    Planning and Execution of Connected Speech Goal 1 (SLP)    Progress (Planning and Execution of Connected Speech Goal 1, SLP)  --  --  with moderate cues (50-74%)  -KB    Progress/Outcomes (Planning and Execution of Connected Speech Goal 1, SLP)  --  --  goal ongoing  -KB    Comment (Planning and Execution of Connected Speech Goal 1, SLP)  --  --  70% imitation of functional CV words, 90% wiht additional cues, 10% approximations  -KB       Augmentative/Alternative Communication Objectives Goal 1 (SLP    Progress (Augmentative/Alternative Communication Goal 1, SLP)  --  with moderate cues (50-74%)  -KB  --    Progress/Outcomes (Augmentative/Alternative Communication Goal 1, SLP)  --  goal ongoing  -KB  --    Comment (Augmentative/Alternative Communication Goal 1, SLP)  --  patient was able to identify 3/6 named items on basic picture communication page, 5/6 with cues  -KB  --      User Key  (r) = Recorded By, (t) = Taken By, (c) = Cosigned By    Initials Name Provider Type    KB Bruton, Katherine L Speech and Language Pathologist                  Time Calculation:       Time Calculation- SLP     Row Name 08/22/19 1030 08/22/19 0904          Time Calculation- SLP    SLP Start Time  1000  -KB  0830  -KB     SLP Stop Time  1030  -KB  0900  -KB     SLP Time Calculation (min)  30 min  -KB  30 min  -KB       User Key  (r) = Recorded By, (t) = Taken By, (c) = Cosigned By    Initials Name Provider Type    KB Bruton, Katherine L Speech and Language Pathologist            Therapy Charges for Today     Code Description Service Date Service Provider Modifiers Qty    93238802637  ST TREATMENT SPEECH 2 8/21/2019 Bruton, Katherine L GN 1    [id number]  ST TREATMENT SPEECH 4 8/22/2019 Bruton, Katherine L GN 1                           Katherine L Bruton  8/22/2019

## 2019-08-22 NOTE — PLAN OF CARE
Problem: Fall Risk (Adult)  Goal: Absence of Fall  Outcome: Ongoing (interventions implemented as appropriate)   08/22/19 1603   Fall Risk (Adult)   Absence of Fall making progress toward outcome       Problem: Patient Care Overview  Goal: Plan of Care Review  Outcome: Ongoing (interventions implemented as appropriate)   08/22/19 1603   Patient Care Overview   IRF Plan of Care Review progress ongoing, continue   Progress, Functional Goals demonstrating adequate progress   Coping/Psychosocial   Plan of Care Reviewed With patient   OTHER   Outcome Summary sitter in place due to her being uncooperative and fighting and pulling line. very mad yesterday. did not relax until some time after family came. took no meds until then. did better today. took tylenol, insulin and all meds and ate a good lunch. Dr. Heaton saw. ordered PRN IM zyprexa if needed. wants to give paxil time to work.       Problem: Stroke (IRF) (Adult)  Goal: Promote Optimal Functional [place]  Outcome: Ongoing (interventions implemented as appropriate)   08/22/19 1603   Stroke (IRF) (Adult)   Promote Optimal Functional [place] demonstrating adequate progress

## 2019-08-22 NOTE — PROGRESS NOTES
Inpatient Rehabilitation Functional Measures Assessment    Functional Measures  KATI Eating:  [facility] Grooming: [facility] Bathing:  [facility] Upper Body Dressing:  [facility] Lower Body Dressing:  [facility] Toileting:  [facility] Bladder Management  Level of Assistance:  [place]  Frequency/Number of Accidents this Shift:  [facility] Bowel Management  Level of Assistance: [place]  Frequency/Number of Accidents this Shift: [facility] Bed/Chair/Wheelchair Transfer:  [facility] Toilet Transfer:  [facility] Tub/Shower Transfer:  [place]    Previously Documented Mode of Locomotion at Discharge: Field  KATI Expected Mode of Locomotion at Discharge: [facility] Walk/Wheelchair:  [facility] Stairs:  [facility] Comprehension:  Auditory comprehension is the usual mode. Comprehension  Score = 6, Modified [place].  Patient comprehends complex/abstract  information in their primary language with only mild difficulty.  KATI Expression:  Non-vocal expression is the usual mode. Patient does not  express complex/abstract information in their primary language without a helper.  Expression Score = 3, Moderate Prompting. Patient expresses basic daily needs or  ideas 50-74% of the time. Patient requires moderate/some prompting. Patient  requires the following assistive device(s): Says yes or no. not always  appropriate .  KATI Social Interaction:  Social Interaction Score = 3, Moderate Direction.  Patient interacts appropriately 50-74% of the time.  Patient requires  moderate/some direction for the following behavior(s): Non-cooperative.  KATI Problem Solving:  Patient does not make appropriate decisions in order to  solve complex problems without assistance from a helper. Problem Solving Score =  2, Maximal Direction. Patient makes appropriate decisions in order to solve  routine problems 25-49% of the time. Patient requires maximal direction for the  following behavior(s): Decreased awareness of performance.  [facility]  Memory:  Memory Score = 2, Maximal Prompting. Patient recognizes and  remembers 25-49% of the time. Patient requires maximal/a lot of prompting (most  of the time) for memory for the following: Inability to follow multi-step  commands. Disoriented to person, place, time or situation.    Therapy Mode Minutes  Occupational Therapy: [facility]  Physical Therapy: [facility]  Speech Language Pathology:  [facility]    Signed by: Rogers Erickson RN

## 2019-08-22 NOTE — THERAPY TREATMENT NOTE
Inpatient Rehabilitation - Occupational Therapy Treatment Note    [facility]     Patient Name: Darby Workman  : 1945  MRN: [id number]    Today's Date: 2019                 Admit Date: 2019      Visit Dx:  No diagnosis found.    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)   • Right-sided headache   • Acute ischemic stroke (CMS/HCC)   • Embolic stroke (CMS/HCC)   • Cerebral infarction due to stenosis of left carotid artery (CMS/HCC)   • Anticoagulated by anticoagulation treatment   • Stroke (cerebrum) (CMS/HCC)         Therapy Treatment    IRF Treatment Summary     Row Name 19 1538 19 1036 19 1000       Evaluation/Treatment Time and Intent    Subjective Information  no complaints  -AF  no complaints  -KP  no complaints  -KB    Existing Precautions/Restrictions  fall  -AF  fall  -KP  fall communication, swallow  -KB    Document Type  therapy note (daily note)  -AF  therapy note (daily note);progress note/recertification  -  therapy note (daily note)  -KB    Mode of Treatment  occupational therapy  -AF  physical therapy  -KP  speech-language pathology  -KB    Patient/Family Observations  pt sitting up in bed with NSG taking morning medications, agreeable to changing clothes and grooming tasks   -AF  Pt arrived form SLP  -KP  seated in wc, sitter in patient room; agreeable to therapy in tx office  -KB    Start Time (Evaluation/Treatment)  --  --  1000  -KB    Stop Time (Evaluation/Treatment)  --  --  1030  -KB    Unable to Perform (Evaluation/Treatment)  patient unavailable off floor for testing in PM  -AF  patient unavailable Pt off floor for testing in pm.  -  --    Recorded by [AF] Ingris Ansari, OTR [KP] Marycruz Schmitt, PT [KB] Bruton, Katherine L    Row Name 19 6729              Evaluation/Treatment Time and Intent    Subjective Information  no complaints  -KB      Existing Precautions/Restrictions  fall communication, swallow  -KB      Document Type  therapy note (daily note)  -KB      Mode of Treatment  speech-language pathology  -KB      Patient/Family Observations  patient seated upright in wc in her room; agreeable to therapy this date, pleasant and cooperative  -KB      Start Time (Evaluation/Treatment)  0830  -KB      Stop Time (Evaluation/Treatment)  0900  -KB      Recorded by [KB] BrutonGingerJennapoly Ulloa Name 08/22/19 1538 08/22/19 1036          Cognition/Psychosocial- PT/OT    Affect/Mental Status (Cognitive)  anxious;confused  -AF  confused;anxious  -KP     Behavioral Issues (Cognitive)  --  overwhelmed easily;difficulty managing stress  -KP     Orientation Status (Cognition)  unable/difficult to assess  -AF  unable/difficult to assess  -KP     Follows Commands (Cognition)  25-49% accuracy;increased processing time needed;verbal cues/prompting required with rote tasks   -AF  0-24% accuracy  -KP     Personal Safety Interventions  fall prevention program maintained;gait belt;nonskid shoes/slippers when out of bed  -AF  fall prevention program maintained;gait belt;nonskid shoes/slippers when out of bed;supervised activity  -KP     Cognitive Function (Cognitive)  attention deficit;safety deficit;memory deficit  -AF  attention deficit;safety deficit  -KP     Attention Deficit (Cognitive)  moderate deficit;severe deficit  -AF  moderate deficit;distractible in noisy environment;distractible in quiet environment;divided attention;restless when unoccupied  -KP     Memory Deficit (Cognitive)  unable/difficult to assess  -AF  unable/difficult to assess  -KP     Safety Deficit (Cognitive)  moderate deficit;insight into deficits/self awareness;awareness of need for assistance  -AF  moderate deficit;awareness of need for assistance;impulsivity;insight into deficits/self awareness  -KP      Recorded by [AF] Ingris Ansari, OTR [KP] Marycruz Schmitt, PT     Row Name 08/22/19 1538 08/22/19 1036          Bed Mobility Assessment/Treatment    Rolling Right [place] (Bed Mobility)  --  supervision  -KP     Supine-Sit [place] (Bed Mobility)  supervision  -AF  --     Sit-Supine [place] (Bed Mobility)  --  supervision  -KP     Bed Mobility, Safety Issues  --  decreased use of arms for pushing/pulling  -KP     Assistive Device (Bed Mobility)  --  bed rails  -KP     Recorded by [AF] Ingris Ansari, OTR [KP] Marycruz Schmitt, PT     Row Name 08/22/19 1538             Functional Mobility    Functional Mobility- Comment  walked in room and bathroom SBA/CGA  -AF      Recorded by [AF] Ingris Ansari OTR      Row Name 08/22/19 1036             Chair-Bed Transfer    Chair-Bed [place] (Transfers)  stand by assist;verbal cues  -KP      Recorded by [KP] Marycruz Schmitt, PT      Row Name 08/22/19 1036             Sit-Stand Transfer    Sit-Stand [place] (Transfers)  stand by assist;verbal cues  -KP      Recorded by [KP] Marycruz Schmitt, PT      Row Name 08/22/19 1036             Stand-Sit Transfer    Stand-Sit [place] (Transfers)  stand by assist;verbal cues  -KP      Recorded by [KP] Marycruz Schmitt, PT      Row Name 08/22/19 1538             Toilet Transfer    Type (Toilet Transfer)  stand-sit;sit-stand  -AF      [place] Level (Toilet Transfer)  verbal cues;contact guard;stand by assist  -AF      Assistive Device (Toilet Transfer)  commode;grab bars/safety frame  -AF      Recorded by [AF] Ingris Ansari OTR      Row Name 08/22/19 1036             Gait/Stairs Assessment/Training    [place] Level (Gait)  stand by assist;contact guard  -KP      Distance in Feet (Gait)  225  -KP      Pattern (Gait)  step-through  -KP      Deviations/Abnormal Patterns (Gait)  gait speed decreased;stride length decreased  -KP      Bilateral Gait Deviations  heel strike decreased;forward  flexed posture  -KP      [place] Level (Stairs)  contact guard  -KP      Handrail Location (Stairs)  right side (ascending)  -KP      Number of Steps (Stairs)  4  -KP      Ascending Technique (Stairs)  step-to-step  -KP      Descending Technique (Stairs)  step-to-step  -KP      Stairs, Safety Issues  weight-shifting ability decreased;sequencing ability decreased  -KP      Stairs, Impairments  strength decreased;sensation decreased;impaired balance  -KP      Comment (Gait/Stairs)  carpets, turns, unlevel  -KP      Recorded by [KP] Marycruz Schmitt, PT      Row Name 08/22/19 1036             Safety Issues, Functional Mobility    Safety Issues Affecting Function (Mobility)  ability to follow commands;insight into deficits/self awareness  -KP      Impairments Affecting Function (Mobility)  balance;cognition;endurance/activity tolerance;pain;shortness of breath;strength  -KP      Comment, Safety Issues/Impairments (Mobility)  poor insight, confursion limits therapies  -KP      Recorded by [KP] Marycruz Schmitt PT      Row Name 08/22/19 1538             Bathing Assessment/Treatment    Comment (Bathing)  washed face only therapist assisted with arm pits pt deferred to wash any other parts   -AF      Recorded by [AF] Ingris Ansari OTR      Row Name 08/22/19 1538             Upper Body Dressing Assessment/Treatment    Upper Body Dressing Task  upper body dressing skills;minimum assist (75% or more patient effort)  -AF      Upper Body Dressing Position  edge of bed sitting  -AF      Comment (Upper Body Dressing)  assist with fastener  -AF      Recorded by [AF] Ingris Ansari OTR      Row Name 08/22/19 1538             Lower Body Dressing Assessment/Treatment    Lower Body Dressing [place] Level  doff;don;pants/bottoms;shoes/slippers;socks;minimum assist (75% patient effort);verbal cues  -AF      Lower Body Dressing Position  edge of bed sitting  -AF      Comment (Lower Body Dressing)  assist to don shoes  and fix heels   -AF      Recorded by [AF] Ingris Ansari OTR      Row Name 08/22/19 1538             Grooming Assessment/Treatment    Grooming [place] Level  grooming skills;minimum assist (75% patient effort);verbal cues  -AF      Grooming Position  sink side;unsupported standing  -AF      Comment (Grooming)  CGA for balance standing at sink with grooming tasks   -AF      Recorded by [AF] Ingris Ansari OTR      Row Name 08/22/19 1538             Toileting Assessment/Treatment    Toileting [place] Level  minimum assist (75% patient effort)  -AF      Assistive Device Use (Toileting)  grab bar/safety frame;raised toilet seat  -AF      Toileting Position  unsupported sitting;supported standing  -AF      Recorded by [AF] Ingris Ansari OTR      Row Name 08/22/19 1538 08/22/19 1000 08/22/19 0830       Pain Scale: Numbers Pre/Post-Treatment    Pain Scale: Numbers, Pretreatment  0/10 - no pain  -AF  0/10 - no pain  -KB  0/10 - no pain  -KB    Pain Scale: Numbers, Post-Treatment  0/10 - no pain  -AF  0/10 - no pain  -KB  0/10 - no pain  -KB    Recorded by [AF] Ingris Ansari OTR [KB] Bruton, Katherine L [KB] Bruton, Katherine L    Row Name 08/22/19 1036             Pain Scale: FACES Pre/Post-Treatment    Pain: FACES Scale, Pretreatment  0-->no hurt  -KP      Pain: FACES Scale, Post-Treatment  0-->no hurt  -KP      Recorded by [KP] Marycruz Schmitt PT      Row Name 08/22/19 1036             Aerobic Exercise Activity    Exercise Performed (Aerobic, Therapeutic Exercise)  recumbent elliptical   -KP      Comment (Aerobic, Therapeutic Exercise)  S8, A8, W3  -KP      Recorded by [KP] Marycruz Schmitt PT      Row Name 08/22/19 1036             Lower Extremity Standing Therapeutic Exercise    Performed, Standing Lower Extremity (Therapeutic Exercise)  hip flexion/extension;hip abduction/adduction;hip/knee flexion/extension;heel raises  -KP      Device, Standing Lower Extremity (Therapeutic  Exercise)  neftali bars  -KP      Sets/Reps Detail, Standing Lower Extremity (Therapeutic Exercise)  1/10  -KP      Comment, Standing Lower Extremity (Therapeutic Exercise)  VC for counting, technique  -KP      Recorded by [KP] Marycruz Schmitt PT      Row Name 08/22/19 1538 08/22/19 1036          Positioning and Restraints    Pre-Treatment Position  in bed  -AF  sitting in chair/recliner  -KP     Post Treatment Position  wheelchair  -AF  wheelchair  -KP     In Wheelchair  sitting;exit alarm on;with SLP  -AF  sitting;encouraged to call for assist;notified nsg;patient within staff view  -KP     Recorded by [AF] Ingris Ansari, OTR [KP] Marycruz Schmitt PT     Row Name 08/22/19 1036             Weekly Summary of Progress (PT)    Weekly Outcome Summary: Physical Therapy  Pt continues to require SBA for mobility due to cognitive concerns.  Pt with poor insight into deficits, poor follow through with safety.  Pt Indep is limited by confusion and cognition.    -KP      Recorded by [KP] Marycruz Schmitt PT        User Key  (r) = Recorded By, (t) = Taken By, (c) = Cosigned By    Initials Name Effective Dates    AF Ingris Ansari, OTR 04/03/18 -     Marycruz Norwood PT 04/03/18 -     KB Bruton, Katherine L 03/07/18 -           Wound 07/17/19 1015 Left neck incision (Active)   Dressing Appearance open to air 8/22/2019  8:40 AM   Closure Liquid skin adhesive 8/22/2019  8:40 AM   Base dry;clean 8/22/2019  8:40 AM   Periwound intact;dry 8/21/2019  7:05 PM   [place] Temperature warm 8/21/2019  7:05 PM   Drainage Amount none 8/22/2019  8:40 AM   Dressing Care, Wound open to air 8/21/2019  7:05 PM         OT Recommendation and Plan    Anticipated Equipment Needs At Discharge (OT Eval): bathing equipment  Planned Therapy Interventions (OT Eval): activity tolerance training, BADL retraining, cognitive/visual perception retraining, functional balance retraining, neuromuscular control/coordination retraining,  occupation/activity based interventions, patient/caregiver education/training, ROM/therapeutic exercise, strengthening exercise, transfer/mobility retraining            OT IRF GOALS     Row Name 08/22/19 1543             Transfer Goal 1 (OT-IRF)    Progress/Outcomes (Transfer Goal 1, OT-IRF)  goal met  -AF         Transfer Goal 2 (OT-IRF)    Activity/Assistive Device (Transfer Goal 2, OT-IRF)  shower chair;walk-in shower;toilet  -AF      [place] Level (Transfer Goal 2, OT-IRF)  standby assist;contact guard assist  -AF      Time Frame (Transfer Goal 2, OT-IRF)  long term goal (LTG)  -AF      Progress/Outcomes (Transfer Goal 2, OT-IRF)  goal ongoing  -AF         Bathing Goal 1 (OT-IRF)    Progress/Outcomes (Bathing Goal 1, OT-IRF)  goal met  -AF         Bathing Goal 2 (OT-IRF)    Activity/Device (Bathing Goal 2, OT-IRF)  bathing skills, all;grab bar/tub rail;hand-held shower spray hose;shower chair  -AF      [place] Level (Bathing Goal 2, OT-IRF)  contact guard assist  -AF      Time Frame (Bathing Goal 2, OT-IRF)  long term goal (LTG)  -AF      Progress/Outcomes (Bathing Goal 2, OT-IRF)  goal ongoing  -AF         UB Dressing Goal 1 (OT-IRF)    Activity/Device (UB Dressing Goal 1, OT-IRF)  upper body dressing  -AF      [place] (UB Dress Goal 1, OT-IRF)  set-up required  -AF      Time Frame (UB Dressing Goal 1, OT-IRF)  long term goal (LTG)  -AF      Progress/Outcomes (UB Dressing Goal 1, OT-IRF)  goal ongoing  -AF         LB Dressing Goal 1 (OT-IRF)    Progress/Outcomes (LB Dressing Goal 1, OT-IRF)  goal met  -AF         LB Dressing Goal 2 (OT-IRF)    Activity/Device (LB Dressing Goal 2, OT-IRF)  lower body dressing  -AF      [place] (LB Dressing Goal 2, OT-IRF)  verbal cues required;contact guard assist  -AF      Time Frame (LB Dressing Goal 2, OT-IRF)  long term goal (LTG)  -AF      Progress/Outcomes (LB Dressing Goal 2, OT-IRF)  goal ongoing  -AF         Grooming Goal 1 (OT-IRF)     Progress/Outcomes (Grooming Goal 1, OT-IRF)  goal met  -AF         Grooming Goal 2 (OT-IRF)    Activity/Device (Grooming Goal 2, OT-IRF)  grooming skills, all  -AF      [place] (Grooming Goal 2, OT-IRF)  supervision required  -AF      Time Frame (Grooming Goal 2, OT-IRF)  long term goal (LTG)  -AF      Progress/Outcomes (Grooming Goal 2, OT-IRF)  goal ongoing  -AF         Toileting Goal 1 (OT-IRF)    Progress/Outcomes (Toileting Goal 1, OT-IRF)  goal met  -AF         Toileting Goal 2 (OT-IRF)    Activity/Device (Toileting Goal 2, OT-IRF)  toileting skills, all  -AF      [place] Level (Toileting Goal 2, OT-IRF)  verbal cues required;contact guard assist  -AF      Time Frame (Toileting Goal 2, OT-IRF)  long term goal (LTG)  -AF      Progress/Outcomes (Toileting Goal 2, OT-IRF)  goal ongoing  -AF         Caregiver Training Goal 1 (OT-IRF)    Caregiver Training Goal 1 (OT-IRF)  family and patient to demo safe technqiues with ADLs, transfers, HEP and adaptive equipment as needed prior to d/c   -AF      Time Frame (Caregiver Training Goal 1, OT-IRF)  long term goal (LTG)  -AF      Progress/Outcomes (Caregiver Training Goal 1, OT-IRF)  goal ongoing  -AF        User Key  (r) = Recorded By, (t) = Taken By, (c) = Cosigned By    Initials Name Provider Type    Ingris Youssef OTR Occupational Therapist          Occupational Therapy Education     Title: PT OT SLP Therapies (Not Started)     Topic: Occupational Therapy (In Progress)     Point: ADL training (In Progress)     Description: Instruct learner(s) on proper safety adaptation and remediation techniques during self care or transfers.   Instruct in proper use of assistive devices.    Learning Progress Summary           Patient Acceptance, E, NR,NL by JHONNY at 8/19/2019 11:08 PM    Nonacceptance, E,D, NR by DONI at 8/17/2019 11:44 AM    Acceptance, E, VU,NR by AF at 8/15/2019  3:22 PM    Comment:  Pt and family participated in meeting with rehab team, recommend 24  hour supervision and would beneift from being in her own environment. discussed her safety awareness and the need for hands on assistance with some ADL tasks seocndary R UE and cognition    Nonacceptance, E, NR by AF at 8/13/2019  3:48 PM    Comment:  benefits and purpose of therapy   Family Acceptance, E, VU,NR by AF at 8/15/2019  3:22 PM    Comment:  Pt and family participated in meeting with rehab team, recommend 24 hour supervision and would beneift from being in her own environment. discussed her safety awareness and the need for hands on assistance with some ADL tasks seocndary R UE and cognition                   Point: Home exercise program (In Progress)     Description: Instruct learner(s) on appropriate technique for monitoring, assisting and/or progressing therapeutic exercises/activities.    Learning Progress Summary           Patient Acceptance, E, NR,NL by WN at 8/19/2019 11:08 PM    Nonacceptance, E,D, NR by JS at 8/17/2019 11:44 AM    Acceptance, E, VU,NR by AF at 8/15/2019  3:22 PM    Comment:  Pt and family participated in meeting with rehab team, recommend 24 hour supervision and would beneift from being in her own environment. discussed her safety awareness and the need for hands on assistance with some ADL tasks seocndary R UE and cognition   Family Acceptance, E, VU,NR by AF at 8/15/2019  3:22 PM    Comment:  Pt and family participated in meeting with rehab team, recommend 24 hour supervision and would beneift from being in her own environment. discussed her safety awareness and the need for hands on assistance with some ADL tasks seocndary R UE and cognition                               User Key     Initials Effective Dates Name Provider Type Discipline    JS 04/06/17 -  Steph Salinas, PT Physical Therapist PT    AF 04/03/18 -  Ingris Ansari OTR Occupational Therapist OT    WN 06/24/19 -  Luis Abernathy, RN Registered Nurse Nurse                       Time Calculation:     Time  Calculation- OT     Row Name 08/22/19 1545 08/22/19 1543          Time Calculation- OT    OT Start Time  --  -AF  0800  -AF     OT Stop Time  --  -AF  0830  -AF     OT Time Calculation (min)  --  -AF  30 min  -AF       User Key  (r) = Recorded By, (t) = Taken By, (c) = Cosigned By    Initials Name Provider Type    AF Ingris Ansari, OTKEVIN Occupational Therapist          Therapy Charges for Today     Code Description Service Date Service Provider Modifiers Qty    48443057958 HC OT SELF CARE/MGMT/TRAIN EA 15 MIN 8/21/2019 Ingris Ansari OTR GO 2    53480311154 HC OT SELF CARE/MGMT/TRAIN EA 15 MIN 8/22/2019 Ingris Ansari OTKEVIN  2                   JOHN Mejia  8/22/2019

## 2019-08-23 LAB
ANION GAP SERPL CALCULATED.3IONS-SCNC: 7.6 MMOL/L (ref 5–15)
BASOPHILS # BLD AUTO: 0.04 10*3/MM3 (ref 0–0.2)
BASOPHILS NFR BLD AUTO: 1 % (ref 0–1.5)
BUN BLD-MCNC: 17 MG/DL (ref 8–23)
BUN/CREAT SERPL: 23.3 (ref 7–25)
CALCIUM SPEC-SCNC: 9.1 MG/DL (ref 8.6–10.5)
CHLORIDE SERPL-SCNC: 101 MMOL/L (ref 98–107)
CO2 SERPL-SCNC: 28.4 MMOL/L (ref 22–29)
CREAT BLD-MCNC: 0.73 MG/DL (ref 0.57–1)
DEPRECATED RDW RBC AUTO: 53.1 FL (ref 37–54)
EOSINOPHIL # BLD AUTO: 0.14 10*3/MM3 (ref 0–0.4)
EOSINOPHIL NFR BLD AUTO: 3.3 % (ref 0.3–6.2)
ERYTHROCYTE [DISTWIDTH] IN BLOOD BY AUTOMATED COUNT: 15.4 % (ref 12.3–15.4)
GFR SERPL CREATININE-BSD FRML MDRD: 78 ML/MIN/1.73
GLUCOSE BLD-MCNC: 108 MG/DL (ref 65–99)
GLUCOSE BLDC GLUCOMTR-MCNC: 106 MG/DL (ref 70–130)
GLUCOSE BLDC GLUCOMTR-MCNC: 111 MG/DL (ref 70–130)
GLUCOSE BLDC GLUCOMTR-MCNC: 138 MG/DL (ref 70–130)
GLUCOSE BLDC GLUCOMTR-MCNC: 162 MG/DL (ref 70–130)
HCT VFR BLD AUTO: 30.6 % (ref 34–46.6)
HGB BLD-MCNC: 9.5 G/DL (ref 12–15.9)
IMM GRANULOCYTES # BLD AUTO: 0.02 10*3/MM3 (ref 0–0.05)
IMM GRANULOCYTES NFR BLD AUTO: 0.5 % (ref 0–0.5)
LYMPHOCYTES # BLD AUTO: 0.94 10*3/MM3 (ref 0.7–3.1)
LYMPHOCYTES NFR BLD AUTO: 22.3 % (ref 19.6–45.3)
MCH RBC QN AUTO: 29.1 PG (ref 26.6–33)
MCHC RBC AUTO-ENTMCNC: 31 G/DL (ref 31.5–35.7)
MCV RBC AUTO: 93.6 FL (ref 79–97)
MONOCYTES # BLD AUTO: 0.49 10*3/MM3 (ref 0.1–0.9)
MONOCYTES NFR BLD AUTO: 11.6 % (ref 5–12)
NEUTROPHILS # BLD AUTO: 2.58 10*3/MM3 (ref 1.7–7)
NEUTROPHILS NFR BLD AUTO: 61.3 % (ref 42.7–76)
NRBC BLD AUTO-RTO: 0 /100 WBC (ref 0–0.2)
PLATELET # BLD AUTO: 306 10*3/MM3 (ref 140–450)
PMV BLD AUTO: 9.9 FL (ref 6–12)
POTASSIUM BLD-SCNC: 3.6 MMOL/L (ref 3.5–5.2)
RBC # BLD AUTO: 3.27 10*6/MM3 (ref 3.77–5.28)
SODIUM BLD-SCNC: 137 MMOL/L (ref 136–145)
WBC NRBC COR # BLD: 4.21 10*3/MM3 (ref 3.4–10.8)

## 2019-08-23 PROCEDURE — 80048 BASIC METABOLIC PNL TOTAL CA: CPT | Performed by: PHYSICAL MEDICINE & REHABILITATION

## 2019-08-23 PROCEDURE — 82962 GLUCOSE BLOOD TEST: CPT

## 2019-08-23 PROCEDURE — 85025 COMPLETE CBC W/AUTO DIFF WBC: CPT | Performed by: PHYSICAL MEDICINE & REHABILITATION

## 2019-08-23 PROCEDURE — 97535 SELF CARE MNGMENT TRAINING: CPT

## 2019-08-23 PROCEDURE — 92507 TX SP LANG VOICE COMM INDIV: CPT

## 2019-08-23 PROCEDURE — 63710000001 INSULIN LISPRO (HUMAN) PER 5 UNITS: Performed by: PHYSICAL MEDICINE & REHABILITATION

## 2019-08-23 PROCEDURE — 97110 THERAPEUTIC EXERCISES: CPT

## 2019-08-23 RX ADMIN — HYDRALAZINE HYDROCHLORIDE 50 MG: 50 TABLET, FILM COATED ORAL at 13:16

## 2019-08-23 RX ADMIN — BRIMONIDINE TARTRATE 1 DROP: 2 SOLUTION OPHTHALMIC at 08:47

## 2019-08-23 RX ADMIN — LOSARTAN POTASSIUM: 50 TABLET, FILM COATED ORAL at 08:46

## 2019-08-23 RX ADMIN — ASPIRIN 325 MG: 325 TABLET ORAL at 08:46

## 2019-08-23 RX ADMIN — NEBIVOLOL HYDROCHLORIDE 20 MG: 10 TABLET ORAL at 21:24

## 2019-08-23 RX ADMIN — GLIPIZIDE 5 MG: 5 TABLET ORAL at 06:20

## 2019-08-23 RX ADMIN — LANSOPRAZOLE 30 MG: KIT at 06:43

## 2019-08-23 RX ADMIN — HYDRALAZINE HYDROCHLORIDE 50 MG: 50 TABLET, FILM COATED ORAL at 06:20

## 2019-08-23 RX ADMIN — DORZOLAMIDE HYDROCHLORIDE 1 DROP: 20 SOLUTION/ DROPS OPHTHALMIC at 21:24

## 2019-08-23 RX ADMIN — ATORVASTATIN CALCIUM 80 MG: 80 TABLET, FILM COATED ORAL at 21:25

## 2019-08-23 RX ADMIN — INSULIN LISPRO 2 UNITS: 100 INJECTION, SOLUTION INTRAVENOUS; SUBCUTANEOUS at 11:59

## 2019-08-23 RX ADMIN — METFORMIN HYDROCHLORIDE 1000 MG: 1000 TABLET ORAL at 17:23

## 2019-08-23 RX ADMIN — NYSTATIN 500000 UNITS: 100000 SUSPENSION ORAL at 17:23

## 2019-08-23 RX ADMIN — POTASSIUM CHLORIDE 20 MEQ: 1.5 POWDER, FOR SOLUTION ORAL at 07:51

## 2019-08-23 RX ADMIN — HYDRALAZINE HYDROCHLORIDE 50 MG: 50 TABLET, FILM COATED ORAL at 21:24

## 2019-08-23 RX ADMIN — AMLODIPINE BESYLATE 5 MG: 5 TABLET ORAL at 08:44

## 2019-08-23 RX ADMIN — METFORMIN HYDROCHLORIDE 1000 MG: 1000 TABLET ORAL at 07:51

## 2019-08-23 RX ADMIN — BRIMONIDINE TARTRATE 1 DROP: 2 SOLUTION OPHTHALMIC at 21:24

## 2019-08-23 RX ADMIN — APIXABAN 5 MG: 5 TABLET, FILM COATED ORAL at 21:25

## 2019-08-23 RX ADMIN — NEBIVOLOL HYDROCHLORIDE 20 MG: 10 TABLET ORAL at 08:46

## 2019-08-23 RX ADMIN — NYSTATIN 500000 UNITS: 100000 SUSPENSION ORAL at 12:00

## 2019-08-23 RX ADMIN — AMLODIPINE BESYLATE 5 MG: 5 TABLET ORAL at 21:25

## 2019-08-23 RX ADMIN — NYSTATIN 500000 UNITS: 100000 SUSPENSION ORAL at 21:24

## 2019-08-23 RX ADMIN — APIXABAN 5 MG: 5 TABLET, FILM COATED ORAL at 08:44

## 2019-08-23 RX ADMIN — NYSTATIN 500000 UNITS: 100000 SUSPENSION ORAL at 07:50

## 2019-08-23 RX ADMIN — DORZOLAMIDE HYDROCHLORIDE 1 DROP: 20 SOLUTION/ DROPS OPHTHALMIC at 08:47

## 2019-08-23 RX ADMIN — GLIPIZIDE 5 MG: 5 TABLET ORAL at 16:50

## 2019-08-23 RX ADMIN — PAROXETINE HYDROCHLORIDE 10 MG: 10 TABLET, FILM COATED ORAL at 08:44

## 2019-08-23 RX ADMIN — LANSOPRAZOLE 30 MG: KIT at 16:50

## 2019-08-23 NOTE — PROGRESS NOTES
Occupational Therapy: Individual: 30 minutes.    Physical Therapy: Branch    Speech Language Pathology:  Branch    Signed by: Gabriela Brown OT

## 2019-08-23 NOTE — PROGRESS NOTES
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  Continues with improved strength on the right side.  Patient seen in physical therapy this morning.  Did not identify any new complaint but limited by her aphasia.  Still appears frustrated by her aphasia.      History taken from: patient chart RN    Objective     Vital Signs  Temp:  [98 °F (36.7 °C)-98.5 °F (36.9 °C)] 98 °F (36.7 °C)  Heart Rate:  [65-67] 65  Resp:  [16-18] 18  BP: (112-146)/(58-68) 124/58    Objective  Physical Exam  MENTAL STATUS -  AWAKE / ALERT.  Anxious but less so than yesterday  HEENT-    LUNGS - normal respirations.   Clear to auscultation  HEART- regular  ABD -   normoactive bowel sounds.  Soft nontender.  EXT - no edema  NEURO -alert.  Aphasia.      MOTOR EXAM -takes resistance in the bilateral upper extremities and lower extremities         Results Review:     I reviewed the patient's new clinical results.     CT HEAD - AUGUST 11, 2019  FINDINGS:  Old appearing lacunar type infarcts are again noted about the  right thalamus and basal ganglia regions. There are stable areas of  encephalomalacia in the left parietal and occipital lobes medially.  Generalized atrophy. There are moderately extensive scattered areas of  decreased density in the white matter likely related to chronic ischemic  gliotic changes.    No hydrocephalus.    Glucose   Date/Time Value Ref Range  Status   08/23/2019 1106 162 (H) 70 - 130 mg/dL Final   08/23/2019 0607 106 70 - 130 mg/dL Final   08/22/2019 2113 90 70 - 130 mg/dL Final   08/22/2019 1619 155 (H) 70 - 130 mg/dL Final   08/22/2019 1111 175 (H) 70 - 130 mg/dL Final   08/22/2019 0721 184 (H) 70 - 130 mg/dL Final   08/21/2019 2029 149 (H) 70 - 130 mg/dL Final   08/21/2019 0729 153 (H) 70 - 130 mg/dL Final     Results from last 7 days   Lab Units 08/23/19  0631 08/21/19  0605 08/19/19  0739   WBC 10*3/mm3 4.21 4.86 5.32   HEMOGLOBIN g/dL 9.5* 9.8* 10.3*   HEMATOCRIT % 30.6* 30.6* 33.7*   PLATELETS 10*3/mm3 306 324 382       Ref. Range 5/22/2019 05:44 5/22/2019 11:34 7/9/2019 08:25 7/18/2019 04:49 7/19/2019 05:05 7/23/2019 05:56 8/1/2019 06:48   Hemoglobin Latest Ref Range: 12.0 - 15.9 g/dL 10.8 (L)  10.6 (L) 8.5 (L) 9.7 (L) 9.3 (L) 7.4 (L)   Hematocrit Latest Ref Range: 34.0 - 46.6 % 35.1  33.4 (L) 26.2 (L) 29.9 (L) 28.6 (L) 23.6 (L)     Results from last 7 days   Lab Units 08/23/19  0631 08/21/19  0605 08/19/19  0739   SODIUM mmol/L 137 138 137   POTASSIUM mmol/L 3.6 3.5 3.8   CHLORIDE mmol/L 101 99 101   CO2 mmol/L 28.4 28.1 27.3   BUN mg/dL 17 19 12   CREATININE mg/dL 0.73 0.65 0.63   CALCIUM mg/dL 9.1 8.8 8.9   GLUCOSE mg/dL 108* 117* 168*         Ref. Range 8/1/2019 06:47   25 Hydroxy, Vitamin D Latest Ref Range: 30.0 - 100.0 ng/ml 21.8 (L)       Ref. Range 8/1/2019 06:47   Total Cholesterol Latest Ref Range: 0 - 200 mg/dL 105   HDL Cholesterol Latest Ref Range: 40 - 60 mg/dL 40   LDL Cholesterol  Latest Ref Range: 0 - 100 mg/dL 57   VLDL Cholesterol Latest Ref Range: 5 - 40 mg/dL 8   Triglycerides Latest Ref Range: 0 - 150 mg/dL 40   Medication Review: done  Scheduled Meds:    amLODIPine 5 mg Oral BID - RT   apixaban 5 mg Oral Q12H   aspirin 325 mg Oral Daily   atorvastatin 80 mg Oral Nightly   brimonidine 1 drop Both Eyes BID   dorzolamide 1 drop Both Eyes BID   glipiZIDE 5 mg Oral BID AC   hydrALAZINE 50 mg Oral Q8H   insulin lispro 0-7 Units  Subcutaneous 4x Daily With Meals & Nightly   lansoprazole 30 mg Oral BID AC   losartan-HCTZ (HYZAAR) 100-12.5 combo dose  Oral Daily   metFORMIN 1,000 mg Oral BID With Meals   nebivolol 20 mg Oral BID   nystatin 5 mL Swish & Spit 4x Daily   PARoxetine 10 mg Oral Daily   potassium chloride 20 mEq Oral Daily With Breakfast   vitamin D 50,000 Units Oral Q7 Days     Continuous Infusions:   PRN Meds:.•  acetaminophen  •  aluminum-magnesium hydroxide-simethicone  •  dextrose  •  dextrose  •  glucagon (human recombinant)  •  nitroglycerin  •  OLANZapine      Assessment/Plan       Stroke (cerebrum) (CMS/[facility])      Assessment & Plan  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  If add Namenda, will probably discontinue amantadine as she continues alert.  August 11-she has some increased irritability at times.  This may be related to the underlying stroke deficits itself.  She does not appear to have any dysuria, no odor to her urine.  Staff reports that for the most part she is eating okay.  Will check a follow-up CT of the head to assess for any interval visual changes.  She is on aspirin and Eliquis for stroke prophylaxis.  She had noticeably improved alertness when amantadine was started.  She is readily alert now.  This may be related to natural recovery in terms of her level of arousal at this point.  Current plan is to discontinue the amantadine to see if that helps with her irritability and start  on Namenda for aphasia.  August 12 -  Patient with increased restlessness at times.   Continues aphasia. Not able to identify any complaint.  Appears anxious today. No change on CT head yesterday. Started on Namenda for aphasia on 8/12/19.   August 13- will continue to monitor on recent med adjustments   August 15-She continues with expressive  language deficits.    Again appears anxious related to her aphasia and difficulty expressing herself.    She will be able to get occasional single word for the examiner.  She does follow instructions.  The patient was reviewed with .  Discussed adding on an antidepressant with anxiolytic properties -Paxil-as it appears frustration from her aphasia with associated anxiety with the frustration affects her performance.  We will plan on starting Paxil 10 mg daily tomorrow and monitor response.  Reviewed with the patient's  that would not add a short acting anxiolytic (such as a benzodiazepine or Seroquel) as it may affect her cognitive performance.    August 16-started Paxil 10 mg daily  August 19 - Increased restlessness Friday night - ? Related to UTI vs initiation of Paxil. On Cefdinir for GNR pending ID &Sensitivity.  August 20 -urine culture with E. coli ESBL.  Given her aphasia not able to obtain information regarding dysuria.  As the urinalysis was ordered as part of work-up for increased restlessness this weekend, would have to treat as symptomatic although could be asymptomatic bacteriuria.  Will place on ertapenem 1 g IV daily for 3-5 days.  August 21 - increased restlessness/anxiety today. Labs without any acute change. Blood glucose okay this AM. BP pattern okay. No gross motor changes. Follow on current regimen for now.   Addendum-Patient continued today with  increased restlessness/anxiety/refusing aspects of nursing care. She had had a good day in therapies yesterday. She is calmer now with family present. Did allow IV antibiotic to be infused but has not taken PO meds or eaten dinner yet.   Discussed with patient's /children will need to hold on planned discharge tomorrow given her mood/behavior today.   I do not feel Namenda (started as trial for aphasia) is related as had episodes of restlessness prior to starting, but will discontinue for now so  slate with psychoactive  "medications. Continue Paxil for now. Will consult Psychiatry for input.  August 22-patient more cooperative today, taking medications and will participate in therapies.  Still perseverates on \"no\".  Still with some anxiety related to her aphasia but less than yesterday.  Seen by psychiatry and to continue with Paxil 10 mg daily.  Also order written for Zyprexa 5 mg IM every 8 hours as needed agitation.  To observe on current regimen.    Aphasia -  August 12 - trial of Namenda  August 16-continue to observe on Namenda 5 mg daily for her aphasia and may possibly titrate up next week  August 20-titrate up on Namenda to 5 mg twice a day  August 22-discontinued Namenda.  Do not feel related to her anxiety/restlessness as it was present prior to starting but discontinued to avoid any additional psychoactive medications that might add to it.  Had not seen any improvement in her aphasia with the very brief trial.    Dysphagia-Cortrak feeding tube discontinued on July 31 and started on puréed/honey thick liquid diet with strategies  August 7-advance to fork mash nectar thick liquid diet, consistent carbohydrate.  August 14 - repeat VFSS to assess for advancing to thin liquids  August 15-Video fluoroscopic swallow study today-mechanical soft, no mixed consistency, nectar thick liquid, water protocol between meals, no straws.    History of multiple bilateral strokes and left central retinal artery occlusion    History of left greater than right internal carotid artery stenosis-status post left internal carotid artery stent July 17, 2019    History of hypertension -  Hyzaar 100-25. August 4 - Bystolic increased to 20 mg daily to 20 mg bid.  August 8 - BP still runs high. On discharge in May 2019 was on Hyzaar 100-25 mg daily, Bystolic 20 mg daily, amlodipine 10 mg daily, Hydralazine 50 mg q 8 hours.  Will add Hydralazine initially 10 mg po q 8 hours and titrate up as needed.   August 9-systolic blood pressure elevated last " night and early this morning but better this afternoon at 122-130.  Continue to follow recent addition of hydralazine and titrate up as needed  August 10-systolic blood pressure 175 this afternoon, range otherwise 122-142.  Will titrate up on hydralazine to 20 mg every 8 hours.  August 11-hypertension overall appears improved.  Will titrate up further to 25 mg every 8 hours.  August 12 - add amlodipine 5 mg daily.   August 14 - titrate up on hydralazine to 50 mg q 8 hours  August 15-blood pressure improved this afternoon with systolic blood pressure in the 120s-follow on current regimen  August 16-blood pressure range 110////59-will continue to monitor on present regimen  August 19 - /74 early AM, later 136/71 - increase amlodipine to 5 mg bid    History of diabetes mellitus -Lantus/glipizide (home medication metformin 1000 mg twice daily and glipizide 10 mg twice daily)  August 1-blood sugars were low yesterday afternoon after tube feeds discontinued.  Held glipizide last night and this morning and Lantus last night.  Blood sugar elevated at noontime today.  Will receive resume glipizide at a lower dose of 5 mg twice a day with meals.  Continue sliding scale insulin coverage.  She also is on metformin at home.  August 5-add back metformin initially at 500 mg twice a day and continue glipizide 5 mg twice a day, both one half of previous home dose, titrate up as needed.  August 7-titrate up metformin to 850 mg twice a day.  Add consistent carbohydrate restriction to diet.  August 9-increase metformin to 1000 g twice a day.  August 10-blood glucose 340 last evening but 150-114-178 so far today  August 11-continue to follow blood sugar pattern and may increase glipizide further as current dose glipizide 5 mg twice a day along with metformin 1000 mg twice a day  August 14 - blood glucose  past 24 hours - monitor pattern.  August 15-blood glucose [phone]--569-66-continue to follow  pattern.  Will change sliding scale insulin coverage to Humalog at a lower dose.  She was started on the regular insulin sliding scale when she was on tube feeds.  August 16 - recent blood glucose -012-138  August 19 - recent blood glucose [phone] - increase glipizide to 7.5 mg bid  August 21 - refused blood glucose check today. Will decrease glipizide back to 5 mg bid to avoid potential for hypoglycemia until allow blood glucose check.     Stroke prophylaxis-aspirin/Eliquis/atorvastatin    Anemia-August 1-hemoglobin 7.4.  Most recent check on July 23 HGB 9.3.  Will recheck hemoglobin this afternoon.  Hemoccult stool.  Iron studies.  Reticulocyte count.  Recheck CBC in the a.m.  Added Protonix.  Patient is on aspirin and Eliquis.  With recent stroke  will look to transfuse packed red blood cell.  August 2-hemoglobin improved 9.0-1 unit packed red blood cells.  Elevated reticulocyte count in response to anemia.  Nursing describes dark stool.  Patient discussed with gastroenterology.  At this point unable to do endoscopy as on Eliquis and aspirin.  Will treat symptomatically for now with increasing proton pump inhibitor and transfusing as needed.  August 5-anemia improved-hemoglobin 9.8  August 16-hemoglobin unchanged 9.8    DVT prophylaxis-SCDs/anticoagulation    Impulsivity-   Nursing to do re-orientation with the patient.  Room close to the nursing station.    Endocrine-vitamin D deficiency-ergocalciferol 50,000 units weekly x8 weeks added. Vitamin B12 level and TSH checked in late May unremarkable    Urinary tract infection-August 20 -urine culture with E. coli ESBL.  Given her aphasia not able to obtain information regarding dysuria.  As the urinalysis was ordered as part of work-up for increased restlessness this weekend, would have to treat as symptomatic although could be asymptomatic bacteriuria.  Will place on ertapenem 1 g IV daily for 3-5 days.     Impaired cognition/impaired language,  impaired swallow, impaired activity daily living, impaired mobility     Now admit for comprehensive acute inpatient rehabilitation .  This would be an interdisciplinary program with physical therapy 1 hour,  occupational therapy 1 hour, and speech therapy 1 hour, 5 days a week.  Rehabilitation nursing for carryover, monitoring of cardiopulmonary and neurologic   status, bowel and bladder, and skin  Ongoing physician follow-up.  Weekly team conferences.  Goals are indeterminant.   Rehabilitation prognosis determined.  Medical prognosis determined.  Estimated length of stay is indeterminate.    TEAM CONF - AUGUST 6- TRANFERS CTG. GAIT UP  FEET CTG-MIN ASSIST. UBD MIN. LBD MOD. BATH MOD. SEVERE APHASIA. INCREASED RESISTANCE TO PARTICIPATE AT TIMES.   PUREE/HTL.  GOOD PO INTAKE. ADJUSTING MEDS FOR DIABETES MELLITUS.  BLADDER CONTINENT/INCONTIENT.   ELOS- 2 WEEKS.     TEAM JEFFREY - AUGUST 13 - DID GREAT WITH PT ON Saturday, FOLLOWING COMMANDS AND BATTING A BALL WITH ANOTHER PATIENT. YESTERDAY, VERY FRUSTRATED AND IRRITABLE.  FRUSTRATED BY APHASIA, TRYING TO TELL STAFF SOMETHING. WILL HOLD ON TO OBJECTS, REPEAT SINGLE WORD.   BED CTG. 4 STAIRS CTG.  GAIT 220 FEET CTG-SBA NO DEVICE.  TOILET TRANSFERS CTG-MIN.  GROOMING MIN.  UBD MIN ASSIST FOR BRA, LBD CTG-MIN. BATH CTG-MIN. COORDINATION RUE BETTER.  DYSPHAGIA - IMPROVED - FORK TALA, NTL. DO NOT FEEL SHE WOULD TOLERATE E-STIM. RECEPTIVE LANGUAGE IMPROVED SLIGHTLY , POINTING TO OBJECTS. EXPRESSIVELY PERSEVERATIVE ON A SINGLE WORD, TRIES TO USE PICTURE BOARD TO COMMUNICATE. YES/NO NOT ACCURATE.  CONTINENT BOWEL AND BLADDER, TIMED VOIDS. SKIN INTACT. TYPICALLY GOOD INTAKE.  ELOS - 1.5 WEEKS    AUGUST 14 - In therapies - In OT, increased participation noted with  present   Transfers SBA/CTG  Walked from therapy gym to room without AD SBA and vc's for directions back to the room   300' outdoors on sidewalk, incline; 300', 180', 100' up on rehab unit. Pt was able to  find her room when she got close to it  Bathing, dressing, grooming min assist. Toileting moderate assist.  Pt participated in using R hand to manipualte round pegs into peg board by color with MIN difficutly once pt caught on to the task. with 3D block recreation with R hand with 100% color match, 80% accuracy with block recreation  With SLP, patient was not able to effectively communicate her wants/needs but refused to go to therapy office by planting her heels while rolling in wc down the [place]; tx session completed in her room     August 15-The patient was reviewed with .  Discussed adding on an antidepressant with anxiolytic properties -Paxil-as it appears frustration from her aphasia with associated anxiety with the frustration affects her performance.  We will plan on starting Paxil 10 mg daily tomorrow and monitor response.  Reviewed with the patient's  that would not add a short acting anxiolytic (such as a benzodiazepine or Seroquel) as it may affect her cognitive performance.    August 16-Patient was reviewed with her daughter today including rehabilitation goals, discharge planning, and recent medication adjustment to try to help with her anxiety/frustration with her aphasia.  Reviewed with the daughter that on discussion with the team is felt that she would do better with her functional status/aphasia/anxiety if able to be discharged to home environment with more frequent interactions but if that is not feasible with the need to look at subacute options.    TEAM CONF - AUGUST 20 - GAIT CTG- FEET. NO DEVICE, WALKS TO AND FROM GYM.  ADLS CTG WITH CUING.  PLAYED ENTIRE GAME OF CHECKERS YESTERDAY. VARIABLE PERFORMANCE WITH SPEECH THERAPY 20-90% MATCHING WORDS TO PICTURES.    MECH SOFT, GROUND MEAT, NECTAR THICK LIQUIDS.   TYPICALLY CONTINENT BLADDER BUT INCONTINENT BOWEL. DID FINE LAST EVENING PER SISTER AND DID NOT TRY TO GET UP- WILL DISCONTINUE SITTER. IN ROOM CLOSE TO NURSING  STATION.   BEST- FAMILY CONF LAST WEEK- WILL WORK TOWARD SUBACUTE PLACEMENT     August 23-upper body dressing set up, lower body dressing min assist.  Grooming supervision.  Transfers standby assist.  Ambulated 220 feet without a device contact-guard standby assist.  She spent in therapy this morning but not with physical therapy this afternoon     Barrier to discharge includes  Impaired cognitive-linguistic skills - work on receptive and expressive language and cognition.       Ajit Howard MD  08/23/19  2:56 PM    Time:

## 2019-08-23 NOTE — THERAPY TREATMENT NOTE
Inpatient Rehabilitation - Physical Therapy Treatment Note  [facility]     Patient Name: Darby Workman  : 1945  MRN: [id number]    Today's Date: 2019                 Admit Date: 2019      Visit Dx:    No diagnosis found.    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)   • Right-sided headache   • Acute ischemic stroke (CMS/HCC)   • Embolic stroke (CMS/HCC)   • Cerebral infarction due to stenosis of left carotid artery (CMS/HCC)   • Anticoagulated by anticoagulation treatment   • Stroke (cerebrum) (CMS/HCC)       Therapy Treatment    IRF Treatment Summary     Row Name 19 1115 19 0937 19 0830       Evaluation/Treatment Time and Intent    Subjective Information  no complaints  -KB  no complaints  -LH  no complaints  -KB    Existing Precautions/Restrictions  fall communication, swallow  -KB  fall  -LH  fall commuication, swallow  -KB    Document Type  therapy note (daily note)  -KB  therapy note (daily note)  -  therapy note (daily note)  -KB    Mode of Treatment  speech-language pathology  -KB  individual therapy;physical therapy  -  speech-language pathology  -KB    Patient/Family Observations  assisted patient from bed to wc; agreeable to therapy  -  pt supine in bed no acute distress  -  patient seated upright in bed; agreeable to therapy at bedside  -KB    Start Time (Evaluation/Treatment)  1115  -KB  --  0830  -KB    Stop Time (Evaluation/Treatment)  1145  -KB  --  0900  -KB    Recorded by [KB] Bruton, Katherine L [] Laura Foster, PT [KB] Bruton, Katherine L    Row Name 19 0800             Evaluation/Treatment Time and Intent    Subjective Information  no complaints  -RP      Existing Precautions/Restrictions  fall  -RP      Document Type  therapy note (daily note)  -RP      Mode of  Treatment  occupational therapy  -RP      Patient/Family Observations  pt seated in w/c in dining room w/ no signs of acute distress  -RP      Recorded by [RP] Gabriela Brown, OT      Row Name 08/23/19 0937 08/23/19 0800          Cognition/Psychosocial- PT/OT    Affect/Mental Status (Cognitive)  anxious;confused  -LH  --     Behavioral Issues (Cognitive)  overwhelmed easily  -LH  --     Orientation Status (Cognition)  unable/difficult to assess  -LH  unable/difficult to assess  -RP     Follows Commands (Cognition)  follows one step commands;25-49% accuracy w gestures and cueing  -LH  follows one step commands;25-49% accuracy;verbal cues/prompting required;physical/tactile prompts required  -RP     Personal Safety Interventions  fall prevention program maintained;gait belt;supervised activity sitter  -  fall prevention program maintained;gait belt;nonskid shoes/slippers when out of bed  -RP     Attention Deficit (Cognitive)  --  moderate deficit;severe deficit  -RP     Safety Deficit (Cognitive)  --  moderate deficit;insight into deficits/self awareness;awareness of need for assistance  -RP     Recorded by [] Laura Foster, PT [RP] Gabriela Brown, OT     Row Name 08/23/19 0937 08/23/19 0800          Bed Mobility Assessment/Treatment    Bed Mobility Assessment/Treatment  --  sit-supine  -     Supine-Sit [place] (Bed Mobility)  supervision  -  --     Sit-Supine [place] (Bed Mobility)  supervision  -  supervision  -RP     Recorded by [] Laura Foster, PT [RP] Gabriela Brown, OT     Row Name 08/23/19 0800             Transfer Assessment/Treatment    Transfer Assessment/Treatment  sit-stand transfer;stand-sit transfer;chair-bed transfer  -RP      Recorded by [RP] Gabriela Brown, OT      Row Name 08/23/19 0937             Bed-Chair Transfer    Bed-Chair [place] (Transfers)  stand by assist  -      Recorded by [] Laura Foster, PT      Row Name 08/23/19 0937 08/23/19 0800           Chair-Bed Transfer    Chair-Bed [place] (Transfers)  stand by assist  -  stand by assist  -     Assistive Device (Chair-Bed Transfers)  --  wheelchair  -RP     Recorded by [] Laura Foster, PT [RP] Gabriela Brown, OT     Row Name 08/23/19 0937 08/23/19 0800          Sit-Stand Transfer    Sit-Stand [place] (Transfers)  stand by assist  -  stand by assist  -     Assistive Device (Sit-Stand Transfers)  --  wheelchair  -RP     Recorded by [] Laura Foster, PT [RP] Gabriela Brown, OT     Row Name 08/23/19 0937 08/23/19 0800          Stand-Sit Transfer    Stand-Sit [place] (Transfers)  stand by assist  -  stand by assist  -RP     Assistive Device (Stand-Sit Transfers)  --  wheelchair  -RP     Recorded by [] Laura Foster, PT [RP] Gabriela Brown, OT     Row Name 08/23/19 0937             Gait/Stairs Assessment/Training    [place] Level (Gait)  contact guard;stand by assist  -      Distance in Feet (Gait)  220x2  -      Pattern (Gait)  step-through  -      Deviations/Abnormal Patterns (Gait)  festinating/shuffling;gait speed decreased  -      Bilateral Gait Deviations  heel strike decreased;forward flexed posture  -LH      Recorded by [] Laura Foster, PT      Row Name 08/23/19 0800             Basic Activities of Daily Living (BADLs)    Basic Activities of Daily Living  bathing;upper body dressing;lower body dressing;grooming  -RP      Recorded by [RP] Gabriela Brown, OT      Row Name 08/23/19 0800             Bathing Assessment/Treatment    Bathing [place] Level  bathing skills;upper body;set up;verbal cues  -RP      Bathing Position  sink side;supported sitting  -RP      Bathing Setup Assistance  obtain supplies  -RP      Recorded by [RP] Gabriela Brown, OT      Row Name 08/23/19 0800             Upper Body Dressing Assessment/Treatment    Upper Body Dressing Task  upper body dressing skills;doff;don;pull over garment;set up assistance;verbal cues  -RP      Upper  Body Dressing Position  supported sitting  -RP      Set-up Assistance (Upper Body Dressing)  obtain clothing  -RP      Recorded by [RP] Gabriela Brown, OT      Row Name 08/23/19 0800             Lower Body Dressing Assessment/Treatment    Lower Body Dressing [place] Level  doff;don;pants/bottoms;socks;minimum assist (75% patient effort);verbal cues  -RP      Lower Body Dressing Position  supported sitting;supported standing  -RP      Lower Body Dressing Setup Assistance  obtain clothing  -RP      Recorded by [RP] Gabriela Brown, OT      Row Name 08/23/19 0800             Grooming Assessment/Treatment    Grooming [place] Level  grooming skills;hair care, combing/brushing;oral care regimen;wash face, hands;set up;supervision  -RP      Grooming Position  sink side;supported sitting  -RP      Grooming Setup Assistance  obtain supplies  -RP      Recorded by [RP] Gabriela Brown, OT      Row Name 08/23/19 1115 08/23/19 0830          Pain Scale: Numbers Pre/Post-Treatment    Pain Scale: Numbers, Pretreatment  0/10 - no pain  -KB  0/10 - no pain  -KB     Pain Scale: Numbers, Post-Treatment  0/10 - no pain  -KB  0/10 - no pain  -KB     Recorded by [KB] Bruton, Katherine L [KB] Bruton, Katherine L     Row Name 08/23/19 0937 08/23/19 0800          Pain Scale: FACES Pre/Post-Treatment    Pain: FACES Scale, Pretreatment  0-->no hurt  -LH  0-->no hurt  -RP     Pain: FACES Scale, Post-Treatment  0-->no hurt  -LH  0-->no hurt  -RP     Recorded by [LH] Laura Fosetr, PT [RP] Gabriela Brown, OT     Row Name 08/23/19 0937             Standing Balance Activity    Activities Performed (Standing, Balance Training)  standing ball toss balloon  -      Support Needed for Balance (Standing, Balance Training)  CGA;balances without upper extremity support  -      Comment (Standing, Balance Training)  balloon bating and toss multidirectional. also standing kickball CGA, 1 LOB w  min to recover  -LH      Recorded by [LH] Cristian  Laura NOBLE PT      Row Name 08/23/19 0937 08/23/19 0800          Positioning and Restraints    Pre-Treatment Position  in bed  -  sitting in chair/recliner  -     Post Treatment Position  bed  -  bed  -RP     In Bed  supine;call light within reach;encouraged to call for assist;exit alarm on sitter  -  supine;call light within reach;encouraged to call for assist;exit alarm on;with nsg  -RP     Recorded by [] Laura Foster, PT [RP] Gabriela Brown, OT       User Key  (r) = Recorded By, (t) = Taken By, (c) = Cosigned By    Initials Name Effective Dates     Laura Foster, PT 04/03/18 -     KB Bruton, Katherine HILLARY 03/07/18 -     Gabriela Davis OT 05/03/18 -         Wound 07/17/19 1015 Left neck incision (Active)   Dressing Appearance open to air 8/23/2019  8:00 AM   Closure Liquid skin adhesive 8/23/2019  8:00 AM   Base dry;clean 8/22/2019  9:49 PM   Drainage Amount none 8/22/2019  9:49 PM   Dressing Care, Wound open to air 8/22/2019  9:49 PM     Physical Therapy Education     Title: PT OT SLP Therapies (Not Started)     Topic: Physical Therapy (In Progress)     Point: Mobility training (In Progress)     Learning Progress Summary           Patient Nonacceptance, E,TB,D, NR by  at 8/23/2019  9:45 AM    Acceptance, E,D, NR by NESTOR at 8/20/2019  1:50 PM    Acceptance, E, NR,NL by JHONNY at 8/19/2019 11:08 PM    Acceptance, E,TB, NR by KELBY at 8/19/2019 12:11 PM    Nonacceptance, E,D, NR by DONI at 8/17/2019 11:44 AM    Acceptance, E,TB, VU,NR by KELBY at 8/14/2019  3:15 PM    Nonacceptance, E,TB,D, NR by ENID at 8/5/2019 12:00 PM    Acceptance, E,D, NR by NESTOR at 8/3/2019  1:35 PM    Acceptance, D, DU,NR by NESTOR at 8/3/2019  8:56 AM                   Point: Home exercise program (In Progress)     Learning Progress Summary           Patient Acceptance, E, NR,NL by JHONNY at 8/19/2019 11:08 PM    Nonacceptance, E,D, NR by JS at 8/17/2019 11:44 AM    Nonacceptance, E,TB,ANA PAULA, NR by ENID at 8/5/2019 12:00 PM                   Point: Body  mechanics (In Progress)     Learning Progress Summary           Patient Acceptance, E, NR,NL by WN at 8/19/2019 11:08 PM    Nonacceptance, E,D, NR by JS at 8/17/2019 11:44 AM                   Point: Precautions (In Progress)     Learning Progress Summary           Patient Acceptance, E, NR,NL by WN at 8/19/2019 11:08 PM    Nonacceptance, E,D, NR by JS at 8/17/2019 11:44 AM    Acceptance, E, NR by MD at 8/16/2019  8:36 AM    Acceptance, E, NR by MD at 8/15/2019  8:59 AM    Acceptance, E, NR by MD at 8/13/2019 11:14 AM    Acceptance, E, NR by MD at 8/12/2019 10:45 AM    Acceptance, E, NR by MD at 8/10/2019 11:15 AM    Acceptance, E, NR by MD at 8/9/2019 11:12 AM    Acceptance, E, NR by MD at 8/8/2019 11:48 AM    Acceptance, E, NR by MD at 8/6/2019 10:41 AM    Acceptance, E, NR by MD at 8/2/2019 10:44 AM    Acceptance, E,D, NR by MD at 8/1/2019 12:16 PM                               User Key     Initials Effective Dates Name Provider Type Discipline     06/08/18 -  Poppy Rosa, PT Physical Therapist PT    JS 04/06/17 -  Steph Salinas, PT Physical Therapist PT     04/03/18 -  Laura Foster, PT Physical Therapist PT    JK 04/03/18 -  Nicole Austin, PT Physical Therapist PT    MD 04/03/18 -  Mira Chadwick, PT Physical Therapist PT     04/03/18 -  Marycruz Schmitt, PT Physical Therapist PT    WN 06/24/19 -  Luis Abernathy RN Registered Nurse Nurse                  PT Recommendation and Plan                        Time Calculation:     PT Charges     Row Name 08/23/19 1401 08/23/19 0944          Time Calculation    Start Time  --  0900  -     Stop Time  --  0930  -     Time Calculation (min)  --  30 min  -     PT Received On  --  08/23/19  -     PT - Next Appointment  08/24/19  -  08/24/19  -       User Key  (r) = Recorded By, (t) = Taken By, (c) = Cosigned By    Initials Name Provider Type    Laura Andres, PT Physical Therapist          Therapy Charges for Today     Code Description Service  Date Service Provider Modifiers Qty    [id number]  PT THER PROC EA 15 MIN 8/23/2019 Laura Foster, PT GP 2                   Laura Foster, PT  8/23/2019

## 2019-08-23 NOTE — THERAPY TREATMENT NOTE
Inpatient Rehabilitation - Occupational Therapy Treatment Note    [facility]     Patient Name: Darby Workman  : 1945  MRN: [id number]    Today's Date: 2019                 Admit Date: 2019      Visit Dx:  No diagnosis found.    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)   • Right-sided headache   • Acute ischemic stroke (CMS/HCC)   • Embolic stroke (CMS/HCC)   • Cerebral infarction due to stenosis of left carotid artery (CMS/HCC)   • Anticoagulated by anticoagulation treatment   • Stroke (cerebrum) (CMS/HCC)         Therapy Treatment    IRF Treatment Summary     Row Name 19 0937 19 08       Evaluation/Treatment Time and Intent    Subjective Information  no complaints  -  no complaints  -  no complaints  -RP    Existing Precautions/Restrictions  fall  -  fall commuication, swallow  -KB  fall  -RP    Document Type  therapy note (daily note)  -  therapy note (daily note)  -  therapy note (daily note)  -    Mode of Treatment  individual therapy;physical therapy  -  speech-language pathology  -KB  occupational therapy  -RP    Patient/Family Observations  pt supine in bed no acute distress  -  patient seated upright in bed; agreeable to therapy at bedside  -  pt seated in w/c in dining room w/ no signs of acute distress  -RP    Start Time (Evaluation/Treatment)  --  0830  -KB  --    Stop Time (Evaluation/Treatment)  --  0900  -KB  --    Recorded by [] Laura Foster, PT [KB] Bruton, Katherine L [RP] Gabriela Brown OT    Row Name 19 0937 19 0800          Cognition/Psychosocial- PT/OT    Affect/Mental Status (Cognitive)  anxious;confused  -  --     Behavioral Issues (Cognitive)  overwhelmed easily  -  --     Orientation Status (Cognition)  unable/difficult to  assess  -  unable/difficult to assess  -RP     Follows Commands (Cognition)  follows one step commands;25-49% accuracy w gestures and cueing  -LH  follows one step commands;25-49% accuracy;verbal cues/prompting required;physical/tactile prompts required  -RP     Personal Safety Interventions  fall prevention program maintained;gait belt;supervised activity sitter  -  fall prevention program maintained;gait belt;nonskid shoes/slippers when out of bed  -RP     Attention Deficit (Cognitive)  --  moderate deficit;severe deficit  -RP     Safety Deficit (Cognitive)  --  moderate deficit;insight into deficits/self awareness;awareness of need for assistance  -RP     Recorded by [] Laura Foster, PT [RP] Gabriela Brown, OT     Row Name 08/23/19 0937 08/23/19 0800          Bed Mobility Assessment/Treatment    Bed Mobility Assessment/Treatment  --  sit-supine  -     Supine-Sit [place] (Bed Mobility)  supervision  -  --     Sit-Supine [place] (Bed Mobility)  supervision  -  supervision  -     Recorded by [] Laura Foster, PT [RP] Gabriela Brown, OT     Row Name 08/23/19 0800             Transfer Assessment/Treatment    Transfer Assessment/Treatment  sit-stand transfer;stand-sit transfer;chair-bed transfer  -      Recorded by [RP] Gabriela Brown, OT      Row Name 08/23/19 0937             Bed-Chair Transfer    Bed-Chair [place] (Transfers)  stand by assist  -      Recorded by [] Laura Foster, PT      Row Name 08/23/19 0937 08/23/19 0800          Chair-Bed Transfer    Chair-Bed [place] (Transfers)  stand by assist  -  stand by assist  -     Assistive Device (Chair-Bed Transfers)  --  wheelchair  -RP     Recorded by [] Laura Foster, PT [RP] Gabriela Brown, OT     Row Name 08/23/19 0937 08/23/19 0800          Sit-Stand Transfer    Sit-Stand [place] (Transfers)  stand by assist  -  stand by assist  -     Assistive Device (Sit-Stand Transfers)  --  wheelchair  -RP      Recorded by [] Laura Foster, PT [RP] Gabriela Brwon, OT     Row Name 08/23/19 0937 08/23/19 0800          Stand-Sit Transfer    Stand-Sit [place] (Transfers)  stand by assist  -  stand by assist  -RP     Assistive Device (Stand-Sit Transfers)  --  wheelchair  -RP     Recorded by [] Laura Foster, PT [RP] Gabriela Brown, OT     Row Name 08/23/19 0937             Gait/Stairs Assessment/Training    [place] Level (Gait)  contact guard;stand by assist  -      Distance in Feet (Gait)  220x2  -      Pattern (Gait)  step-through  -      Deviations/Abnormal Patterns (Gait)  festinating/shuffling;gait speed decreased  -      Bilateral Gait Deviations  heel strike decreased;forward flexed posture  -LH      Recorded by [] Laura Foster, PT      Row Name 08/23/19 0800             Basic Activities of Daily Living (BADLs)    Basic Activities of Daily Living  bathing;upper body dressing;lower body dressing;grooming  -RP      Recorded by [RP] Gabriela Brown, OT      Row Name 08/23/19 0800             Bathing Assessment/Treatment    Bathing [place] Level  bathing skills;upper body;set up;verbal cues  -RP      Bathing Position  sink side;supported sitting  -RP      Bathing Setup Assistance  obtain supplies  -RP      Recorded by [RP] Gabriela Brown, OT      Row Name 08/23/19 0800             Upper Body Dressing Assessment/Treatment    Upper Body Dressing Task  upper body dressing skills;doff;don;pull over garment;set up assistance;verbal cues  -RP      Upper Body Dressing Position  supported sitting  -RP      Set-up Assistance (Upper Body Dressing)  obtain clothing  -RP      Recorded by [RP] Gabriela Brown, OT      Row Name 08/23/19 0800             Lower Body Dressing Assessment/Treatment    Lower Body Dressing [place] Level  doff;don;pants/bottoms;socks;minimum assist (75% patient effort);verbal cues  -RP      Lower Body Dressing Position  supported sitting;supported standing  -RP      Lower  Body Dressing Setup Assistance  obtain clothing  -RP      Recorded by [RP] Gabriela Brown, OT      Row Name 08/23/19 0800             Grooming Assessment/Treatment    Grooming [place] Level  grooming skills;hair care, combing/brushing;oral care regimen;wash face, hands;set up;supervision  -RP      Grooming Position  sink side;supported sitting  -RP      Grooming Setup Assistance  obtain supplies  -RP      Recorded by [RP] Gabriela Brown, OT      Row Name 08/23/19 0830             Pain Scale: Numbers Pre/Post-Treatment    Pain Scale: Numbers, Pretreatment  0/10 - no pain  -KB      Pain Scale: Numbers, Post-Treatment  0/10 - no pain  -KB      Recorded by [KB] Bruton, Katherine L      Row Name 08/23/19 0937 08/23/19 0800          Pain Scale: FACES Pre/Post-Treatment    Pain: FACES Scale, Pretreatment  0-->no hurt  -LH  0-->no hurt  -RP     Pain: FACES Scale, Post-Treatment  0-->no hurt  -LH  0-->no hurt  -RP     Recorded by [LH] Laura Foster, PT [RP] Gabriela Brown, OT     Row Name 08/23/19 0937             Standing Balance Activity    Activities Performed (Standing, Balance Training)  standing ball toss balloon  -      Support Needed for Balance (Standing, Balance Training)  CGA;balances without upper extremity support  -      Comment (Standing, Balance Training)  balloon bating and toss multidirectional. also standing kickball CGA, 1 LOB w  min to recover  -LH      Recorded by [LH] Laura Foster, PT      Row Name 08/23/19 0937 08/23/19 0800          Positioning and Restraints    Pre-Treatment Position  in bed  -LH  sitting in chair/recliner  -RP     Post Treatment Position  bed  -  bed  -RP     In Bed  supine;call light within reach;encouraged to call for assist;exit alarm on sitter  -LH  supine;call light within reach;encouraged to call for assist;exit alarm on;with nsg  -RP     Recorded by [LH] Laura Foster, PT [RP] Gabriela Brown, OT       User Key  (r) = Recorded By, (t) = Taken By, (c) =  Cosigned By    Initials Name Effective Dates     Laura Foster, PT 04/03/18 -     KB BrutonJenna fabian [place] 03/07/18 -     RP Gabriela Brown, OT 05/03/18 -           Wound 07/17/19 1015 Left neck incision (Active)   Dressing Appearance open to air 8/23/2019  8:00 AM   Closure Liquid skin adhesive 8/23/2019  8:00 AM   Base dry;clean 8/22/2019  9:49 PM   Drainage Amount none 8/22/2019  9:49 PM   Dressing Care, Wound open to air 8/22/2019  9:49 PM         OT Recommendation and Plan                 OT IRF GOALS     Row Name 08/22/19 1543             Transfer Goal 1 (OT-IRF)    Progress/Outcomes (Transfer Goal 1, OT-IRF)  goal met  -AF         Transfer Goal 2 (OT-IRF)    Activity/Assistive Device (Transfer Goal 2, OT-IRF)  shower chair;walk-in shower;toilet  -AF      [place] Level (Transfer Goal 2, OT-IRF)  standby assist;contact guard assist  -AF      Time Frame (Transfer Goal 2, OT-IRF)  long term goal (LTG)  -AF      Progress/Outcomes (Transfer Goal 2, OT-IRF)  goal ongoing  -AF         Bathing Goal 1 (OT-IRF)    Progress/Outcomes (Bathing Goal 1, OT-IRF)  goal met  -AF         Bathing Goal 2 (OT-IRF)    Activity/Device (Bathing Goal 2, OT-IRF)  bathing skills, all;grab bar/tub rail;hand-held shower spray hose;shower chair  -AF      [place] Level (Bathing Goal 2, OT-IRF)  contact guard assist  -AF      Time Frame (Bathing Goal 2, OT-IRF)  long term goal (LTG)  -AF      Progress/Outcomes (Bathing Goal 2, OT-IRF)  goal ongoing  -AF         UB Dressing Goal 1 (OT-IRF)    Activity/Device (UB Dressing Goal 1, OT-IRF)  upper body dressing  -AF      [place] (UB Dress Goal 1, OT-IRF)  set-up required  -AF      Time Frame (UB Dressing Goal 1, OT-IRF)  long term goal (LTG)  -AF      Progress/Outcomes (UB Dressing Goal 1, OT-IRF)  goal ongoing  -AF         LB Dressing Goal 1 (OT-IRF)    Progress/Outcomes (LB Dressing Goal 1, OT-IRF)  goal met  -AF         LB Dressing Goal 2 (OT-IRF)    Activity/Device (LB Dressing  Goal 2, OT-IRF)  lower body dressing  -AF      [place] (LB Dressing Goal 2, OT-IRF)  verbal cues required;contact guard assist  -AF      Time Frame (LB Dressing Goal 2, OT-IRF)  long term goal (LTG)  -AF      Progress/Outcomes (LB Dressing Goal 2, OT-IRF)  goal ongoing  -AF         Grooming Goal 1 (OT-IRF)    Progress/Outcomes (Grooming Goal 1, OT-IRF)  goal met  -AF         Grooming Goal 2 (OT-IRF)    Activity/Device (Grooming Goal 2, OT-IRF)  grooming skills, all  -AF      [place] (Grooming Goal 2, OT-IRF)  supervision required  -AF      Time Frame (Grooming Goal 2, OT-IRF)  long term goal (LTG)  -AF      Progress/Outcomes (Grooming Goal 2, OT-IRF)  goal ongoing  -AF         Toileting Goal 1 (OT-IRF)    Progress/Outcomes (Toileting Goal 1, OT-IRF)  goal met  -AF         Toileting Goal 2 (OT-IRF)    Activity/Device (Toileting Goal 2, OT-IRF)  toileting skills, all  -AF      [place] Level (Toileting Goal 2, OT-IRF)  verbal cues required;contact guard assist  -AF      Time Frame (Toileting Goal 2, OT-IRF)  long term goal (LTG)  -AF      Progress/Outcomes (Toileting Goal 2, OT-IRF)  goal ongoing  -AF         Caregiver Training Goal 1 (OT-IRF)    Caregiver Training Goal 1 (OT-IRF)  family and patient to demo safe technqiues with ADLs, transfers, HEP and adaptive equipment as needed prior to d/c   -AF      Time Frame (Caregiver Training Goal 1, OT-IRF)  long term goal (LTG)  -AF      Progress/Outcomes (Caregiver Training Goal 1, OT-IRF)  goal ongoing  -AF        User Key  (r) = Recorded By, (t) = Taken By, (c) = Cosigned By    Initials Name Provider Type    Ingris Youssef OTR Occupational Therapist          Occupational Therapy Education     Title: PT OT SLP Therapies (Not Started)     Topic: Occupational Therapy (In Progress)     Point: ADL training (In Progress)     Description: Instruct learner(s) on proper safety adaptation and remediation techniques during self care or transfers.   Instruct in  proper use of assistive devices.    Learning Progress Summary           Patient Acceptance, E, NR,NL by JHONNY at 8/19/2019 11:08 PM    Nonacceptance, E,D, NR by JS at 8/17/2019 11:44 AM    Acceptance, E, VU,NR by AF at 8/15/2019  3:22 PM    Comment:  Pt and family participated in meeting with rehab team, recommend 24 hour supervision and would beneift from being in her own environment. discussed her safety awareness and the need for hands on assistance with some ADL tasks seocndary R UE and cognition    Nonacceptance, E, NR by AF at 8/13/2019  3:48 PM    Comment:  benefits and purpose of therapy   Family Acceptance, E, VU,NR by AF at 8/15/2019  3:22 PM    Comment:  Pt and family participated in meeting with rehab team, recommend 24 hour supervision and would beneift from being in her own environment. discussed her safety awareness and the need for hands on assistance with some ADL tasks seocndary R UE and cognition                   Point: Home exercise program (In Progress)     Description: Instruct learner(s) on appropriate technique for monitoring, assisting and/or progressing therapeutic exercises/activities.    Learning Progress Summary           Patient Acceptance, E, NR,NL by JHONNY at 8/19/2019 11:08 PM    Nonacceptance, E,D, NR by DONI at 8/17/2019 11:44 AM    Acceptance, E, VU,NR by AF at 8/15/2019  3:22 PM    Comment:  Pt and family participated in meeting with rehab team, recommend 24 hour supervision and would beneift from being in her own environment. discussed her safety awareness and the need for hands on assistance with some ADL tasks seocndary R UE and cognition   Family Acceptance, E, VU,NR by AF at 8/15/2019  3:22 PM    Comment:  Pt and family participated in meeting with rehab team, recommend 24 hour supervision and would beneift from being in her own environment. discussed her safety awareness and the need for hands on assistance with some ADL tasks seocndary R UE and cognition                                User Key     Initials Effective Dates Name Provider Type Discipline    JS 04/06/17 -  Setph Salinas, HESHAM Physical Therapist PT    AF 04/03/18 -  Ingris Ansari, OTR Occupational Therapist OT    WN 06/24/19 -  Luis Abernathy, RN Registered Nurse Nurse                       Time Calculation:     Time Calculation- OT     Row Name 08/23/19 1155             Time Calculation- OT    OT Start Time  0800  -RP      OT Stop Time  0830  -RP      OT Time Calculation (min)  30 min  -RP      OT Received On  08/23/19  -RP        User Key  (r) = Recorded By, (t) = Taken By, (c) = Cosigned By    Initials Name Provider Type    RP Gabriela Brown, OT Occupational Therapist          Therapy Charges for Today     Code Description Service Date Service Provider Modifiers Qty    85392823529 HC OT SELF CARE/MGMT/TRAIN EA 15 MIN 8/23/2019 Gabriela Brown, OT GO 2                   Gabriela Brown OT  8/23/2019

## 2019-08-23 NOTE — PROGRESS NOTES
Inpatient Rehabilitation Functional Measures Assessment    Functional Measures  KATI Eating:  [facility] Grooming: [facility] Bathing:  [facility] Upper Body Dressing:  [facility] Lower Body Dressing:  [facility] Toileting:  [facility] Bladder Management  Level of Assistance:  [place]  Frequency/Number of Accidents this Shift:  [facility] Bowel Management  Level of Assistance: [place]  Frequency/Number of Accidents this Shift: [facility] Bed/Chair/Wheelchair Transfer:  [facility] Toilet Transfer:  [facility] Tub/Shower Transfer:  [place]    Previously Documented Mode of Locomotion at Discharge: Field  KATI Expected Mode of Locomotion at Discharge: [facility] Walk/Wheelchair:  [facility] Stairs:  [facility] Comprehension:  Both ( auditory and visual) modes of comprehension are used  equally. Comprehension Score = 6, Modified [place].  Patient comprehends  complex/abstract information in their primary language with only mild  difficulty. Very forgetful and confused at times  KATI Expression:  Both ( vocal and non-vocal) modes of expression are used  equally. Expression Score = 6,  Modified [place]. Patient expresses  complex/abstract information in their primary language with only mild  difficulty. Very forgetful and confused at times. Expressive aphasia and  dysarthria. Slurring of words and only speak Yes/No.  KATI Social Interaction:  Social Interaction Score = 1, Total Assistance. Patient  interacts appropriately less than 25% of the time.  Patient requires total  assistance (directing all or almost all of the time) for the following  behavior(s): Non-cooperative. Severely withdrawn. Very forgetful and confused at  times  KATI Problem Solving:  Patient does not make appropriate decisions in order to  solve complex problems without assistance from a helper. Problem Solving Score =  3, Moderate Direction. Patient makes appropriate decisions in order to solve  routine problems 50-74% of the  time. Patient requires moderate/some direction  for the following behavior(s): Difficulty completing tasks. Inability to follow  multi-step commands. Impulsivity. Poor judgment. Very forgetful and confused at  times  KATI Memory:  Memory Score = 1, Total Assistance. Patient recognizes and  remembers less than 25% of the time. Patient requires total assistance  (prompting all or almost all of the time) for memory for the following:  Disoriented to person, place, time or situation. Inability to follow one-step  commands. Limited recall of daily routine. Unaware of daily routine. Very  forgetful and confused at times    Therapy Mode Minutes  Occupational Therapy: [place]  Physical Therapy: [place]  Speech Language Pathology:  [place]    Signed by: Dimitry Baldwin RN

## 2019-08-23 NOTE — PROGRESS NOTES
Inpatient Rehabilitation Functional Measures Assessment    Functional Measures  KATI Eating:  [facility] Grooming: [facility] Bathing:  [facility] Upper Body Dressing:  [facility] Lower Body Dressing:  [facility] Toileting:  [facility] Bladder Management  Level of Assistance:  [place]  Frequency/Number of Accidents this Shift:  [facility] Bowel Management  Level of Assistance: [place]  Frequency/Number of Accidents this Shift: [facility] Bed/Chair/Wheelchair Transfer:  [facility] Toilet Transfer:  [facility] Tub/Shower Transfer:  [place]    Previously Documented Mode of Locomotion at Discharge: Field  KATI Expected Mode of Locomotion at Discharge: [facility] Walk/Wheelchair:  [facility] Stairs:  [facility] Comprehension:  Auditory comprehension is the usual mode. Patient does not  comprehend complex/abstract information in their primary language without  assistance from a helper. Comprehension Score = 2, Maximal Prompting. Patient  comprehends basic daily needs 25-49% of the time. Patient understands simple  information via single words or gestures. Requires maximal/a lot of prompting  (most of the time). Patient requires the following assistive device(s): Glasses.    KATI Expression:  Vocal expression is the usual mode. Patient does not express  complex/abstract information in their primary language without a helper.  Expression Score = 2, Maximal Prompting. Patient expresses basic daily needs  25-49% of the time. Patient uses only single words or gestures and requires  maximal/a lot of prompting (most of the time). Patient requires the following  assistive device(s): Communication board/device.  KATI Social Interaction:  Activity was not observed.  KATI Problem Solving:  Activity was not observed.  KATI Memory:  Activity was not observed.    Therapy Mode Minutes  Occupational Therapy: [place]  Physical Therapy: [place]  Speech Language Pathology:  [place]    Signed by: Leslie Mcdonald RN

## 2019-08-23 NOTE — THERAPY TREATMENT NOTE
Inpatient Rehabilitation - Speech Language Pathology Treatment Note    [facility]       Patient Name: Darby Workman  : 1945  MRN: [id number]    Today's Date: 2019           Admit Date: 2019      Visit Dx:      No diagnosis found.    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)   • Right-sided headache   • Acute ischemic stroke (CMS/HCC)   • Embolic stroke (CMS/HCC)   • Cerebral infarction due to stenosis of left carotid artery (CMS/HCC)   • Anticoagulated by anticoagulation treatment   • Stroke (cerebrum) (CMS/HCC)     Therapy Treatment  Evaluation/Coping    Evaluation/Treatment Time and Intent  Subjective Information: no complaints (19 111 : Bruton, Katherine L)  Existing Precautions/Restrictions: fall(communication, swallow) (19 1115 : Bruton, Katherine L)  Document Type: therapy note (daily note) (19 : Bruton, Katherine L)  Mode of Treatment: speech-language pathology (19 1115 : Bruton, Katherine L)  Patient/Family Observations: assisted patient from bed to wc; agreeable to therapy (19 : Bruton, Katherine L)  Start Time (Evaluation/Treatment): 1115 (19 1115 : Bruton, Katherine L)  Stop Time (Evaluation/Treatment): 1145 (19 1115 : Bruton, Katherine L)    Vitals/Pain/Safety    Pain Scale: Numbers Pre/Post-Treatment  Pain Scale: Numbers, Pretreatment: 0/10 - no pain (19 1115 : Bruton, Katherine L)  Pain Scale: Numbers, Post-Treatment: 0/10 - no pain (19 : Bruton, Katherine L)    EDUCATION  The patient has been educated in the following areas:   Cognitive Impairment Communication Impairment.    SLP GOALS     Row Name 19 1115 19 0830 19 1000       Words/Phrases/Sentences Goal 1 (SLP)    Progress (Ability to Contruct  Words/Phrases/Sentences Goal 1, SLP)  with minimal cues (75-90%)  -KB  with moderate cues (50-74%);with maximum cues (25-49%)  -KB  independently (over 90% accuracy)  -KB    Progress/Outcomes (Identify Objects and Pictures Goal 1, SLP)  good progress toward goal;goal ongoing  -KB  goal ongoing  -KB  goal ongoing  -KB    Comment (Words/Phrases/Sentences Goal 1, SLP)   88% identifying picture from fo2 given object function  -KB  60% identifying named body parts, 80% with physical model; 50% identifying pictured items by name in fo2  -KB  70% identifying named body parts, dirent modeling not effective to increase performance  -KB       Comprehend Questions Goal 1 (SLP)    Progress (Ability to Comprehend Questions Goal 1, SLP)  with maximum cues (25-49%)  -KB  --  --    Progress/Outcomes (Comprehend Questions Goal 1, SLP)  goal ongoing  -KB  --  --    Comment (Comprehend Questions Goal 1, SLP)  40% answering basic personal/environmental yes/no questions with visual response options, shortened questions and repetitions needed, 60% with visual cues   -KB  --  --       Reading Comprehension at Word Level Goal 1 (SLP)    Progress (Reading Comprehension at Word Level Goal 1, SLP)  with minimal cues (75-90%)  -KB  with maximum cues (25-49%)  -KB  independently (over 90% accuracy)  -KB    Progress/Outcomes (Reading Comprehension at Word Level Goal 1, SLP)  good progress toward goal;goal ongoing  -KB  goal ongoing  -KB  good progress toward goal;goal ongoing  -KB    Comment (Reading Comprehension at Word Level Goal 1, SLP)  75% matching pictured object to written word in fo2  -KB  25% matching pictured object to written word in fo2  -KB  80% identifying written word in fo2 given dictated word  -KB       Word Retrieval Skills Goal 1 (SLP)    Progress (Word Retrieval Skills Goal 1, SLP)  with maximum cues (25-49%)  -KB  --  with moderate cues (50-74%)  -KB    Progress/Outcomes (Word Retrieval Goal 1, SLP)  goal ongoing  -KB  --   goal ongoing  -KB    Comment (Word Retrieval Goal 1, SLP)  30%, 50% ,30% close approximations when couting 1-10 with a model  -KB  --  20% predictable phrase completion with moderate verbal cues  -KB       Word Retrieval Skills Goal 2 (SLP)    Progress (Word Retrieval Skills Goal 2, SLP)  with maximum cues (25-49%)  -KB  --  --    Progress/Outcomes (Word Retrieval Goal 2, SLP)  goal ongoing  -KB  --  --    Comment (Word Retrieval Goal 2, SLP)  0% confrontation naming of common pictured objects, 30% with direct verbal model  -KB  --  --       Graphic Expression of Shapes, Letters, Numbers Goal 1 (SLP)    Progress (Graphic Expression of Shapes, Letters, and Numbers Goal 1, SLP)  --  --  independently (over 90% accuracy)  -KB    Progress/Outcomes (Graphic Expression of Shapes, Letters, and Numbers Goal 1, SLP)  --  --  goal ongoing  -KB    Comment (Graphic Expression of Shapes, Letters, and Numbers Goal 1, SLP)  --  --  60% writing dictated letters  -KB       Graphic Expression of Words Goal 1 (SLP)    Progress (Graphic Expression of Single Words Goal 1, SLP)  --  with moderate cues (50-74%)  -KB  --    Progress/Outcomes (Graphic Expression of Single Words Goal 1, SLP)  --  goal ongoing  -KB  --    Comment (Graphic Expression of Single Words Goal 1, SLP)  --  50% accuracy writing her name to written prompt, patient became frustrated on second attempt and decreased effort was noted  -KB  --       Planning and Execution of Connected Speech Goal 1 (SLP)    Progress (Planning and Execution of Connected Speech Goal 1, SLP)  with maximum cues (25-49%)  -KB  with 1:1 supervision/constant cues  -KB  with moderate cues (50-74%)  -KB    Progress/Outcomes (Planning and Execution of Connected Speech Goal 1, SLP)  goal ongoing  -KB  goal ongoing  -KB  goal ongoing  -KB    Comment (Planning and Execution of Connected Speech Goal 1, SLP)  20% imitating bilablial CV syllables, 30% with additional max visual/verbal cues  -KB  77%  "imitating functional CV words  -KB  60% imitation of functional CV words, 90% with additional repetitions and visual cues  -KB       Additional Goal 1 (SLP)    Barriers (Additional Goal 1, SLP)  --  --  100% matching like 3-digit number in fo4  -KB    Row Name 08/22/19 0830 08/21/19 1100 08/21/19 0900       Words/Phrases/Sentences Goal 1 (SLP)    Progress (Ability to Contruct Words/Phrases/Sentences Goal 1, SLP)  with moderate cues (50-74%)  -KB  --  --    Progress/Outcomes (Identify Objects and Pictures Goal 1, SLP)  goal ongoing  -KB  --  goal ongoing  -KB    Comment (Words/Phrases/Sentences Goal 1, SLP)  16% identifying named letter/number in fo5, 83% when choices reduced to fo2-3  -KB  --  Attempted basic object identification by naming objects around the room. When asked if patient wanted TV turned on, she pointed her remote toward the television but would not accept assistance to turn on TV. She did not identify other named object attempted (x5).  -KB       Comprehend Questions Goal 1 (SLP)    Progress/Outcomes (Comprehend Questions Goal 1, SLP)  --  goal ongoing  -KB  goal ongoing  -KB    Comment (Comprehend Questions Goal 1, SLP)  --  responded \"no\" to all yes/no questions asked about her family with visual picture book left by a family member  -KB  yes/no questions related to immediate wants/needs asked throughout session, patient perseverated on \"no\" response  -KB       Reading Comprehension at Word Level Goal 1 (SLP)    Progress (Reading Comprehension at Word Level Goal 1, SLP)  with moderate cues (50-74%)  -KB  --  --    Progress/Outcomes (Reading Comprehension at Word Level Goal 1, SLP)  goal ongoing  -KB  --  goal ongoing  -KB    Comment (Reading Comprehension at Word Level Goal 1, SLP)  67% matching written word to picture, 78% with verbal cues  -KB  --  Attempted worksheet matching written words to pictures. She was not able to sustain attention adequately to complete task, as she appeared distracted " "by her frustration.   -KB       Word Retrieval Skills Goal 1 (SLP)    Progress/Outcomes (Word Retrieval Goal 1, SLP)  --  goal ongoing  -KB  goal ongoing  -KB    Comment (Word Retrieval Goal 1, SLP)  --  Verbal output consistent of perseverative \"yes/no\" responses to all attempts at communicating. She mostly responded \"no\" except for responding to a few questions regarding immediate environment. Yes responses did not appear appropriate, as patient would then refuse having task carried out (i.e. stated \"yea\" when asked if she wanted her TV on, but would not allow assistance with remote).   -KB  Verbal output consistent of perseverative \"no\" except when asked if I could sit her up in bed, if she wanted her family, and if she wanted the TV on. She responded \"yea\" to these questions.   -KB       Word Retrieval Skills Goal 2 (SLP)    Progress (Word Retrieval Skills Goal 2, SLP)  with 1:1 supervision/constant cues  -KB  --  --    Progress/Outcomes (Word Retrieval Goal 2, SLP)  goal ongoing  -KB  --  --    Comment (Word Retrieval Goal 2, SLP)  20%, 30%, 20% counting 1-10 with verbal model and visual support  -KB  --  --       Graphic Expression of Words Goal 1 (SLP)    Progress (Graphic Expression of Single Words Goal 1, SLP)  with minimal cues (75-90%);with moderate cues (50-74%)  -KB  --  --    Progress/Outcomes (Graphic Expression of Single Words Goal 1, SLP)  good progress toward goal;goal ongoing  -KB  --  --    Comment (Graphic Expression of Single Words Goal 1, SLP)  65% accurate letter formation when copying written words; 90% writing her name to written prompt  -KB  --  --       Augmentative/Alternative Communication Objectives Goal 1 (SLP    Progress/Outcomes (Augmentative/Alternative Communication Goal 1, SLP)  --  goal ongoing  -KB  --    Comment (Augmentative/Alternative Communication Goal 1, SLP)  --  basic picture communication board utilized to offer patient to be taken to the bathroom and to be taken to " "lunch; she responded \"no\" to both attempts  -KB  --       Additional Goal 1 (SLP)    Barriers (Additional Goal 1, SLP)  100% filling in blanks given written numbers 1-10 and some missig numbers  -KB  --  --      User Key  (r) = Recorded By, (t) = Taken By, (c) = Cosigned By    Initials Name Provider Type    KB Bruton, Katherine L Speech and Language Pathologist          Time Calculation:   Time Calculation- SLP     Row Name 08/23/19 1145 08/23/19 0900          Time Calculation- SLP    SLP Start Time  1115  -KB  0830  -KB     SLP Stop Time  1145  -KB  0900  -KB     SLP Time Calculation (min)  30 min  -KB  30 min  -KB       User Key  (r) = Recorded By, (t) = Taken By, (c) = Cosigned By    Initials Name Provider Type    KB Bruton, Katherine L Speech and Language Pathologist        Therapy Charges for Today     Code Description Service Date Service Provider Modifiers Qty    [id number]  ST TREATMENT SPEECH 4 8/22/2019 Bruton, Katherine L GN 1    [id number]  ST TREATMENT SPEECH 4 8/23/2019 Bruton, Katherine L GN 1        Katherine L Bruton  8/23/2019      "

## 2019-08-23 NOTE — SIGNIFICANT NOTE
08/23/19 1401   Rehab Treatment   Discipline physical therapist   Reason Treatment Not Performed patient/family declined treatment  (pt wouldnt let go of covers to participate w PT. refused despite encouragement/coaxing)   Recommendation   PT - Next Appointment 08/24/19

## 2019-08-23 NOTE — PLAN OF CARE
Problem: Skin Injury Risk (Adult)  Goal: Skin Health and Integrity  Outcome: Ongoing (interventions implemented as appropriate)      Problem: Fall Risk (Adult)  Goal: Absence of Fall  Outcome: Ongoing (interventions implemented as appropriate)      Problem: Patient Care Overview  Goal: Plan of Care Review  Outcome: Ongoing (interventions implemented as appropriate)   08/23/19 0431 08/23/19 1618   Patient Care Overview   IRF Plan of Care Review progress ongoing, continue --    OTHER   Outcome Summary --  Pt. is confused and has a sitter at the bedside. Pt still has aphasia and apraxia/     Goal: Individualization and Mutuality  Outcome: Ongoing (interventions implemented as appropriate)    Goal: Discharge Needs Assessment  Outcome: Ongoing (interventions implemented as appropriate)    Goal: Home Safety Plan  Outcome: Ongoing (interventions implemented as appropriate)    Goal: Coping Plan  Outcome: Ongoing (interventions implemented as appropriate)    Goal: Community Reintegration Plan  Outcome: Ongoing (interventions implemented as appropriate)      Problem: Stroke (IRF) (Adult)  Goal: Promote Optimal Functional [place]  Outcome: Ongoing (interventions implemented as appropriate)

## 2019-08-23 NOTE — PROGRESS NOTES
Discussed plans with Dr Howard. Discharge is on hold and paln will be reevaluated on Monday.   Contacted Laura with [facility]. Hopefully they will not have to obtain a new pre-cert next week.   Spoke with Mr Workman this morning so he is aware that pt may transfer to [facility] on Monday.

## 2019-08-23 NOTE — PLAN OF CARE
Problem: Skin Injury Risk (Adult)  Goal: Skin Health and Integrity  Outcome: Ongoing (interventions implemented as appropriate)   08/23/19 0431   Skin Injury Risk (Adult)   Skin Health and Integrity making progress toward outcome       Problem: Fall Risk (Adult)  Goal: Absence of Fall  Outcome: Ongoing (interventions implemented as appropriate)   08/23/19 0431   Fall Risk (Adult)   Absence of Fall making progress toward outcome       Problem: Patient Care Overview  Goal: Plan of Care Review  Outcome: Ongoing (interventions implemented as appropriate)   08/23/19 0431   Patient Care Overview   IRF Plan of Care Review progress ongoing, continue   Progress, Functional Goals demonstrating adequate progress   Coping/Psychosocial   Plan of Care Reviewed With patient   OTHER   Outcome Summary Pt. is confused and cooperative with staff, but impulsive and uncooperative sometimes. Takes Meds crushed in AS/Pudding. Sitter in the room. AC&HS. BG is 90. Insulin no given at night. No voids in day and night. Bladder scan is 317 ml at 1930 and IC no given at this time. Bladder scan is 421 ml at 2300. Straight catheter #14 applied and got urine 350 ml. Tolerated fair well. No further issues to note at this time. Will continue to monitor.     Goal: Coping Plan  Outcome: Ongoing (interventions implemented as appropriate)   08/23/19 0431   Coping Plan   Demonstration of Effective Coping Strategies demonstrating adequate progress       Problem: Stroke (IRF) (Adult)  Goal: Promote Optimal Functional [place]  Outcome: Ongoing (interventions implemented as appropriate)   08/23/19 0431   Stroke (IRF) (Adult)   Promote Optimal Functional [place] demonstrating adequate progress

## 2019-08-23 NOTE — PROGRESS NOTES
Inpatient Rehabilitation Plan of Care Note    Plan of Care  Care Plan Reviewed - No updates at this time.    Psychosocial    Performed Intervention(s)  Assist patient to express needs and concerns  calm enviroment      Safety    Performed Intervention(s)  Bed alarm, wc alarm  Safety rounds  Items within reach      Body Systems    Performed Intervention(s)  Daily skin inspection      Sphincter Control    Performed Intervention(s)  Monitor intake and output  Encourage fluid intake  Elimination schedule  contact isolation    Signed by: Leslie Mcdonald RN

## 2019-08-23 NOTE — PROGRESS NOTES
Inpatient Rehabilitation Functional Measures Assessment    Functional Measures  KATI Eating:  [facility] Grooming: [facility] Bathing:  [facility] Upper Body Dressing:  [facility] Lower Body Dressing:  [facility] Toileting:  [facility] Bladder Management  Level of Assistance:  Brothers  Frequency/Number of Accidents this Shift:  [facility] Bowel Management  Level of Assistance: Brothers  Frequency/Number of Accidents this Shift: [facility] Bed/Chair/Wheelchair Transfer:  Bed/chair/wheelchair Transfer Score = 5.  Patient is supervision/set-up for transferring to and from the  bed/chair/wheelchair, requiring: Stand by assistance. No assistive devices were  required.  KATI Toilet Transfer:  [facility] Tub/Shower Transfer:  Brothers    Previously Documented Mode of Locomotion at Discharge: Field  KATI Expected Mode of Locomotion at Discharge: [facility] Walk/Wheelchair:  WHEELCHAIR OBSERVATION   Activity was not observed.    WALK OBSERVATION   Walk Distance Scale = 3.  Distance walked is greater than 150 feet. Walk Score  = 4.  Patient performs 75% or more of effort and requires minimal assistance.  Incidental help/contact guard/steadying was provided. Patient walked a distance  of  220 feet. No assistive devices were required.  KATI Stairs:  Activity was not observed.    KATI Comprehension:  Brothers  KATI Expression:  [facility] Social Interaction:  [facility] Problem Solving:  [facility] Memory:  Brothers    Therapy Mode Minutes  Occupational Therapy: Brothers  Physical Therapy: Individual: 30 minutes.  Speech Language Pathology:  Brothers    Signed by: Laura Foster PT

## 2019-08-23 NOTE — PROGRESS NOTES
Inpatient Rehabilitation Plan of Care Note    Plan of Care  Care Plan Reviewed - No updates at this time.    Psychosocial    Performed Intervention(s)  calm enviroment      Safety    Performed Intervention(s)  Bed alarm, wc alarm  Safety rounds  Items within reach      Sphincter Control    Performed Intervention(s)  Monitor intake and output  Encourage fluid intake  Elimination schedule  contact isolation    Signed by: Dimitry Baldwin RN

## 2019-08-24 LAB
GLUCOSE BLDC GLUCOMTR-MCNC: 103 MG/DL (ref 70–130)
GLUCOSE BLDC GLUCOMTR-MCNC: 117 MG/DL (ref 70–130)
GLUCOSE BLDC GLUCOMTR-MCNC: 124 MG/DL (ref 70–130)
GLUCOSE BLDC GLUCOMTR-MCNC: 150 MG/DL (ref 70–130)

## 2019-08-24 PROCEDURE — 92507 TX SP LANG VOICE COMM INDIV: CPT

## 2019-08-24 PROCEDURE — 82962 GLUCOSE BLOOD TEST: CPT

## 2019-08-24 RX ADMIN — DORZOLAMIDE HYDROCHLORIDE 1 DROP: 20 SOLUTION/ DROPS OPHTHALMIC at 09:34

## 2019-08-24 RX ADMIN — GLIPIZIDE 5 MG: 5 TABLET ORAL at 06:21

## 2019-08-24 RX ADMIN — METFORMIN HYDROCHLORIDE 1000 MG: 1000 TABLET ORAL at 09:27

## 2019-08-24 RX ADMIN — POTASSIUM CHLORIDE 20 MEQ: 1.5 POWDER, FOR SOLUTION ORAL at 09:22

## 2019-08-24 RX ADMIN — HYDRALAZINE HYDROCHLORIDE 50 MG: 50 TABLET, FILM COATED ORAL at 15:23

## 2019-08-24 RX ADMIN — APIXABAN 5 MG: 5 TABLET, FILM COATED ORAL at 09:23

## 2019-08-24 RX ADMIN — LANSOPRAZOLE 30 MG: KIT at 19:23

## 2019-08-24 RX ADMIN — LOSARTAN POTASSIUM: 50 TABLET, FILM COATED ORAL at 09:23

## 2019-08-24 RX ADMIN — PAROXETINE HYDROCHLORIDE 10 MG: 10 TABLET, FILM COATED ORAL at 09:21

## 2019-08-24 RX ADMIN — BRIMONIDINE TARTRATE 1 DROP: 2 SOLUTION OPHTHALMIC at 09:34

## 2019-08-24 RX ADMIN — NEBIVOLOL HYDROCHLORIDE 20 MG: 10 TABLET ORAL at 09:28

## 2019-08-24 RX ADMIN — HYDRALAZINE HYDROCHLORIDE 50 MG: 50 TABLET, FILM COATED ORAL at 06:21

## 2019-08-24 RX ADMIN — NYSTATIN 500000 UNITS: 100000 SUSPENSION ORAL at 09:22

## 2019-08-24 RX ADMIN — ASPIRIN 325 MG: 325 TABLET ORAL at 09:23

## 2019-08-24 RX ADMIN — LANSOPRAZOLE 30 MG: KIT at 06:21

## 2019-08-24 RX ADMIN — AMLODIPINE BESYLATE 5 MG: 5 TABLET ORAL at 09:23

## 2019-08-24 NOTE — PLAN OF CARE
Problem: Skin Injury Risk (Adult)  Goal: Skin Health and Integrity  Outcome: Ongoing (interventions implemented as appropriate)      Problem: Fall Risk (Adult)  Goal: Absence of Fall  Outcome: Ongoing (interventions implemented as appropriate)      Problem: Patient Care Overview  Goal: Plan of Care Review  Outcome: Ongoing (interventions implemented as appropriate)   08/24/19 1027   Patient Care Overview   IRF Plan of Care Review progress ongoing, continue   Progress, Functional Goals progress more gradual than expected;demonstrating adequate progress   Coping/Psychosocial   Plan of Care Reviewed With patient;spouse   OTHER   Outcome Summary Pt confused and forgetful at times. Other times responds appropriately. Irritabilty noted today towards spouse and staff after watching something on TV that upset her. Pt was cooperative and able to take her medications crushed in pudding. Refused offerings to go to BR several times. Will need to be bladder scanned and possibly IC today. Monitoring blood sugars. Pt in contact isolation. Sitter at bedside.     Goal: Coping Plan  Outcome: Ongoing (interventions implemented as appropriate)

## 2019-08-24 NOTE — PROGRESS NOTES
Inpatient Rehabilitation Functional Measures Assessment and Plan of Care    Plan of Care  Updated Problems/Interventions  Field    Functional Measures  KATI Eating:  Activity was not observed.  KATI Grooming: [facility] Bathing:  [facility] Upper Body Dressing:  [facility] Lower Body Dressing:  [facility] Toileting:  [facility] Bladder Management  Level of Assistance:  [place]  Frequency/Number of Accidents this Shift:  [facility] Bowel Management  Level of Assistance: [place]  Frequency/Number of Accidents this Shift: [facility] Bed/Chair/Wheelchair Transfer:  [facility] Toilet Transfer:  [facility] Tub/Shower Transfer:  [place]    Previously Documented Mode of Locomotion at Discharge: Field  [facility] Expected Mode of Locomotion at Discharge: [facility] Walk/Wheelchair:  [facility] Stairs:  [facility] Comprehension:  Auditory comprehension is the usual mode. Patient does not  comprehend complex/abstract information in their primary language without  assistance from a helper. Comprehension Score = 1, Total Assistance.  Patient  understands basic daily needs less than 25% of the time and/or does not  understand simple information such as single words or gestures. No assistive  devices were required.  KATI Expression:  Vocal expression is the usual mode. Patient does not express  complex/abstract information in their primary language without a helper.  Expression Score = 1, Total Assistance. Patient expresses basic daily needs less  than 25% of the time, or does not express basic needs appropriately or  consistently despite prompting. No assistive devices were required.  KATI Social Interaction:  Social Interaction Score = 3, Moderate Direction.  Patient interacts appropriately 50-74% of the time.  Patient requires  moderate/some direction for the following behavior(s):  KATI Problem Solving:  Activity was not observed.  KATI Memory:  Activity was not observed.    Therapy Mode Minutes  Occupational Therapy:  [facility]  Physical Therapy: [facility]  Speech Language Pathology:  [facility]    Signed by: Mariposa Zuluaga SLP

## 2019-08-24 NOTE — PROGRESS NOTES
Inpatient Rehabilitation Plan of Care Note    Plan of Care  Care Plan Reviewed - Updates as Follows    Psychosocial    Performed Intervention(s)  Assist patient to express needs and concerns  calm enviroment      Safety    Performed Intervention(s)  Bed alarm, wc alarm  Safety rounds  Items within reach  24 hrs sitter      Body Systems    Performed Intervention(s)  Daily skin inspection      Sphincter Control    Performed Intervention(s)  Monitor intake and output  Encourage fluid intake  Elimination schedule  contact isolation    Signed by: Dimitry Baldwin RN

## 2019-08-24 NOTE — PROGRESS NOTES
Inpatient Rehabilitation Functional Measures Assessment    Functional Measures  KATI Eating:  Branch  [facility] Grooming: Branch  [facility] Bathing:  Branch  [facility] Upper Body Dressing:  Branch  [facility] Lower Body Dressing:  Lower Body Dressing Score = 4. Patient requires  minimal assistance for lower body dressing, requiring incidental help only (such  as task initiation or assist with buttons/zips/snaps). No assistive devices were  required.  KATI Toileting:  Patient requires maximal assistance for adjusting clothing  before using a toilet, commode, bedpan, or urinal. Patient requires moderate  assistance for hygiene. Patient requires maximal assistance for adjusting  clothing after using a toilet, commode, bedpan, or urinal. Patient performs 0 -  24% of toileting tasks.  Toileting Score = 1, Total Assistance. Patient requires  the following assistive device(s): Grab bar.    KATI Bladder Management  Level of Assistance:  Bladder Score = 2. Patient performs 25-49% of tasks and  requires maximal assistance for bladder management. [place] provides most of the  assist to apply AND remove brief.  Frequency/Number of Accidents this Shift:  Bladder accidents this shift:  0 .  Patient has not had an accident, but used a device/medication this shift  requiring: Brief .    KATI Bowel Management  Level of Assistance: Activity was not observed.  Frequency/Number of Accidents this Shift: Branch    [facility] Bed/Chair/Wheelchair Transfer:  Bed/chair/wheelchair Transfer Score = 3.  Patient performs 50-74% of effort and requires moderate assistance (some  lifting)  for transferring to and from the bed/chair/wheelchair, including  assist lifting both legs. Patient requires the following assistive device(s):  Bed rails. Grab bars.  KATI Toilet Transfer:  Toilet Transfer Score = 2.  Patient performs 25-49% of  effort and requires maximal assistance (most of the lifting) for transferring to  and from the toilet/commode. Patient requires the following assistive  device(s):  Grab bars.  KATI Tub/Shower Transfer:  Activity was not observed.    Previously Documented Mode of Locomotion at Discharge: Field  [facility] Expected Mode of Locomotion at Discharge: Branch  [facility] Walk/Wheelchair:  Branch  [facility] Stairs:  Branch    [facility] Comprehension:  Branch  [facility] Expression:  Branch  [facility] Social Interaction:  Branch  [facility] Problem Solving:  Branch  [facility] Memory:  Branch    Therapy Mode Minutes  Occupational Therapy: Branch  Physical Therapy: Branch  Speech Language Pathology:  Branch    Signed by: NUNU Ulloa

## 2019-08-24 NOTE — PROGRESS NOTES
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  Continues with improved strength on the right side arm more than leg.  Patient seen in bed this afternoon.  Did not identify any new complaint but limited by her aphasia.  Still appears frustrated by her aphasia.      History taken from: patient chart RN    Objective     Vital Signs  Temp:  [98.1 °F (36.7 °C)-98.4 °F (36.9 °C)] 98.1 °F (36.7 °C)  Heart Rate:  [67-72] 72  Resp:  [18] 18  BP: (122-153)/(63-68) 153/65    Objective  Physical Exam  MENTAL STATUS -  AWAKE / ALERT.  Anxious but less so than yesterday  HEENT-    LUNGS - normal respirations.   Clear to auscultation  HEART- regular  ABD -   normoactive bowel sounds.  Soft nontender.  EXT - no edema  NEURO -alert.  Aphasia.      MOTOR EXAM -takes resistance in the bilateral upper extremities and lower extremities left more then right         Results Review:     I reviewed the patient's new clinical results.     CT HEAD - AUGUST 11, 2019  FINDINGS:  Old appearing lacunar type infarcts are again noted about the  right thalamus and basal ganglia regions. There are stable areas of  encephalomalacia in the left parietal and occipital lobes medially.  Generalized atrophy. There are moderately extensive scattered areas of  decreased density in the white matter likely related to chronic ischemic  gliotic changes.    No hydrocephalus.    Glucose    Date/Time Value Ref Range Status   08/24/2019 1638 103 70 - 130 mg/dL Final   08/24/2019 1135 150 (H) 70 - 130 mg/dL Final   08/24/2019 0612 117 70 - 130 mg/dL Final   08/23/2019 2031 138 (H) 70 - 130 mg/dL Final   08/23/2019 1622 111 70 - 130 mg/dL Final   08/23/2019 1106 162 (H) 70 - 130 mg/dL Final   08/23/2019 0607 106 70 - 130 mg/dL Final   08/22/2019 2113 90 70 - 130 mg/dL Final     Results from last 7 days   Lab Units 08/23/19  0631 08/21/19  0605 08/19/19  0739   WBC 10*3/mm3 4.21 4.86 5.32   HEMOGLOBIN g/dL 9.5* 9.8* 10.3*   HEMATOCRIT % 30.6* 30.6* 33.7*   PLATELETS 10*3/mm3 306 324 382       Ref. Range 5/22/2019 05:44 5/22/2019 11:34 7/9/2019 08:25 7/18/2019 04:49 7/19/2019 05:05 7/23/2019 05:56 8/1/2019 06:48   Hemoglobin Latest Ref Range: 12.0 - 15.9 g/dL 10.8 (L)  10.6 (L) 8.5 (L) 9.7 (L) 9.3 (L) 7.4 (L)   Hematocrit Latest Ref Range: 34.0 - 46.6 % 35.1  33.4 (L) 26.2 (L) 29.9 (L) 28.6 (L) 23.6 (L)     Results from last 7 days   Lab Units 08/23/19  0631 08/21/19  0605 08/19/19  0739   SODIUM mmol/L 137 138 137   POTASSIUM mmol/L 3.6 3.5 3.8   CHLORIDE mmol/L 101 99 101   CO2 mmol/L 28.4 28.1 27.3   BUN mg/dL 17 19 12   CREATININE mg/dL 0.73 0.65 0.63   CALCIUM mg/dL 9.1 8.8 8.9   GLUCOSE mg/dL 108* 117* 168*         Ref. Range 8/1/2019 06:47   25 Hydroxy, Vitamin D Latest Ref Range: 30.0 - 100.0 ng/ml 21.8 (L)       Ref. Range 8/1/2019 06:47   Total Cholesterol Latest Ref Range: 0 - 200 mg/dL 105   HDL Cholesterol Latest Ref Range: 40 - 60 mg/dL 40   LDL Cholesterol  Latest Ref Range: 0 - 100 mg/dL 57   VLDL Cholesterol Latest Ref Range: 5 - 40 mg/dL 8   Triglycerides Latest Ref Range: 0 - 150 mg/dL 40   Medication Review: done  Scheduled Meds:    amLODIPine 5 mg Oral BID - RT   apixaban 5 mg Oral Q12H   aspirin 325 mg Oral Daily   atorvastatin 80 mg Oral Nightly   brimonidine 1 drop Both Eyes BID   dorzolamide 1 drop Both Eyes BID   glipiZIDE 5 mg Oral BID AC   hydrALAZINE 50 mg Oral Q8H   insulin  lispro 0-7 Units Subcutaneous 4x Daily With Meals & Nightly   lansoprazole 30 mg Oral BID AC   losartan-HCTZ (HYZAAR) 100-12.5 combo dose  Oral Daily   metFORMIN 1,000 mg Oral BID With Meals   nebivolol 20 mg Oral BID   nystatin 5 mL Swish & Spit 4x Daily   PARoxetine 10 mg Oral Daily   potassium chloride 20 mEq Oral Daily With Breakfast   vitamin D 50,000 Units Oral Q7 Days     Continuous Infusions:   PRN Meds:.•  acetaminophen  •  aluminum-magnesium hydroxide-simethicone  •  dextrose  •  dextrose  •  glucagon (human recombinant)  •  nitroglycerin  •  OLANZapine      Assessment/Plan       Stroke (cerebrum) (CMS/[facility])      Assessment & Plan  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  If add Namenda, will probably discontinue amantadine as she continues alert.  August 11-she has some increased irritability at times.  This may be related to the underlying stroke deficits itself.  She does not appear to have any dysuria, no odor to her urine.  Staff reports that for the most part she is eating okay.  Will check a follow-up CT of the head to assess for any interval visual changes.  She is on aspirin and Eliquis for stroke prophylaxis.  She had noticeably improved alertness when amantadine was started.  She is readily alert now.  This may be related to natural recovery in terms of her level of arousal at this point.  Current plan is to discontinue the amantadine to see if that helps with her irritability and start  on Namenda for aphasia.  August 12 -  Patient with increased restlessness at times.   Continues aphasia. Not able to identify any complaint.  Appears anxious today. No change on CT head yesterday. Started on Namenda for aphasia on 8/12/19.   August 13- will continue to monitor on recent med adjustments   August 15-She continues  with expressive language deficits.    Again appears anxious related to her aphasia and difficulty expressing herself.    She will be able to get occasional single word for the examiner.  She does follow instructions.  The patient was reviewed with .  Discussed adding on an antidepressant with anxiolytic properties -Paxil-as it appears frustration from her aphasia with associated anxiety with the frustration affects her performance.  We will plan on starting Paxil 10 mg daily tomorrow and monitor response.  Reviewed with the patient's  that would not add a short acting anxiolytic (such as a benzodiazepine or Seroquel) as it may affect her cognitive performance.    August 16-started Paxil 10 mg daily  August 19 - Increased restlessness Friday night - ? Related to UTI vs initiation of Paxil. On Cefdinir for GNR pending ID &Sensitivity.  August 20 -urine culture with E. coli ESBL.  Given her aphasia not able to obtain information regarding dysuria.  As the urinalysis was ordered as part of work-up for increased restlessness this weekend, would have to treat as symptomatic although could be asymptomatic bacteriuria.  Will place on ertapenem 1 g IV daily for 3-5 days.  August 21 - increased restlessness/anxiety today. Labs without any acute change. Blood glucose okay this AM. BP pattern okay. No gross motor changes. Follow on current regimen for now.   Addendum-Patient continued today with  increased restlessness/anxiety/refusing aspects of nursing care. She had had a good day in therapies yesterday. She is calmer now with family present. Did allow IV antibiotic to be infused but has not taken PO meds or eaten dinner yet.   Discussed with patient's /children will need to hold on planned discharge tomorrow given her mood/behavior today.   I do not feel Namenda (started as trial for aphasia) is related as had episodes of restlessness prior to starting, but will discontinue for now so  slate with  "psychoactive medications. Continue Paxil for now. Will consult Psychiatry for input.  August 22-patient more cooperative today, taking medications and will participate in therapies.  Still perseverates on \"no\".  Still with some anxiety related to her aphasia but less than yesterday.  Seen by psychiatry and to continue with Paxil 10 mg daily.  Also order written for Zyprexa 5 mg IM every 8 hours as needed agitation.  To observe on current regimen.    Aphasia -  August 12 - trial of Namenda  August 16-continue to observe on Namenda 5 mg daily for her aphasia and may possibly titrate up next week  August 20-titrate up on Namenda to 5 mg twice a day  August 22-discontinued Namenda.  Do not feel related to her anxiety/restlessness as it was present prior to starting but discontinued to avoid any additional psychoactive medications that might add to it.  Had not seen any improvement in her aphasia with the very brief trial.    Dysphagia-Cortrak feeding tube discontinued on July 31 and started on puréed/honey thick liquid diet with strategies  August 7-advance to fork mash nectar thick liquid diet, consistent carbohydrate.  August 14 - repeat VFSS to assess for advancing to thin liquids  August 15-Video fluoroscopic swallow study today-mechanical soft, no mixed consistency, nectar thick liquid, water protocol between meals, no straws.    History of multiple bilateral strokes and left central retinal artery occlusion    History of left greater than right internal carotid artery stenosis-status post left internal carotid artery stent July 17, 2019    History of hypertension -  Hyzaar 100-25. August 4 - Bystolic increased to 20 mg daily to 20 mg bid.  August 8 - BP still runs high. On discharge in May 2019 was on Hyzaar 100-25 mg daily, Bystolic 20 mg daily, amlodipine 10 mg daily, Hydralazine 50 mg q 8 hours.  Will add Hydralazine initially 10 mg po q 8 hours and titrate up as needed.   August 9-systolic blood pressure " elevated last night and early this morning but better this afternoon at 122-130.  Continue to follow recent addition of hydralazine and titrate up as needed  August 10-systolic blood pressure 175 this afternoon, range otherwise 122-142.  Will titrate up on hydralazine to 20 mg every 8 hours.  August 11-hypertension overall appears improved.  Will titrate up further to 25 mg every 8 hours.  August 12 - add amlodipine 5 mg daily.   August 14 - titrate up on hydralazine to 50 mg q 8 hours  August 15-blood pressure improved this afternoon with systolic blood pressure in the 120s-follow on current regimen  August 16-blood pressure range 110////59-will continue to monitor on present regimen  August 19 - /74 early AM, later 136/71 - increase amlodipine to 5 mg bid    History of diabetes mellitus -Lantus/glipizide (home medication metformin 1000 mg twice daily and glipizide 10 mg twice daily)  August 1-blood sugars were low yesterday afternoon after tube feeds discontinued.  Held glipizide last night and this morning and Lantus last night.  Blood sugar elevated at noontime today.  Will receive resume glipizide at a lower dose of 5 mg twice a day with meals.  Continue sliding scale insulin coverage.  She also is on metformin at home.  August 5-add back metformin initially at 500 mg twice a day and continue glipizide 5 mg twice a day, both one half of previous home dose, titrate up as needed.  August 7-titrate up metformin to 850 mg twice a day.  Add consistent carbohydrate restriction to diet.  August 9-increase metformin to 1000 g twice a day.  August 10-blood glucose 340 last evening but 150-114-178 so far today  August 11-continue to follow blood sugar pattern and may increase glipizide further as current dose glipizide 5 mg twice a day along with metformin 1000 mg twice a day  August 14 - blood glucose  past 24 hours - monitor pattern.  August 15-blood glucose  [phone]-continue to follow pattern.  Will change sliding scale insulin coverage to Humalog at a lower dose.  She was started on the regular insulin sliding scale when she was on tube feeds.  August 16 - recent blood glucose -379-138  August 19 - recent blood glucose [phone] - increase glipizide to 7.5 mg bid  August 21 - refused blood glucose check today. Will decrease glipizide back to 5 mg bid to avoid potential for hypoglycemia until allow blood glucose check.     Stroke prophylaxis-aspirin/Eliquis/atorvastatin    Anemia-August 1-hemoglobin 7.4.  Most recent check on July 23 HGB 9.3.  Will recheck hemoglobin this afternoon.  Hemoccult stool.  Iron studies.  Reticulocyte count.  Recheck CBC in the a.m.  Added Protonix.  Patient is on aspirin and Eliquis.  With recent stroke  will look to transfuse packed red blood cell.  August 2-hemoglobin improved 9.0-1 unit packed red blood cells.  Elevated reticulocyte count in response to anemia.  Nursing describes dark stool.  Patient discussed with gastroenterology.  At this point unable to do endoscopy as on Eliquis and aspirin.  Will treat symptomatically for now with increasing proton pump inhibitor and transfusing as needed.  August 5-anemia improved-hemoglobin 9.8  August 16-hemoglobin unchanged 9.8    DVT prophylaxis-SCDs/anticoagulation    Impulsivity-   Nursing to do re-orientation with the patient.  Room close to the nursing station.    Endocrine-vitamin D deficiency-ergocalciferol 50,000 units weekly x8 weeks added. Vitamin B12 level and TSH checked in late May unremarkable    Urinary tract infection-August 20 -urine culture with E. coli ESBL.  Given her aphasia not able to obtain information regarding dysuria.  As the urinalysis was ordered as part of work-up for increased restlessness this weekend, would have to treat as symptomatic although could be asymptomatic bacteriuria.  Will place on ertapenem 1 g IV daily for 3-5  days.     Impaired cognition/impaired language, impaired swallow, impaired activity daily living, impaired mobility     Now admit for comprehensive acute inpatient rehabilitation .  This would be an interdisciplinary program with physical therapy 1 hour,  occupational therapy 1 hour, and speech therapy 1 hour, 5 days a week.  Rehabilitation nursing for carryover, monitoring of cardiopulmonary and neurologic   status, bowel and bladder, and skin  Ongoing physician follow-up.  Weekly team conferences.  Goals are indeterminant.   Rehabilitation prognosis determined.  Medical prognosis determined.  Estimated length of stay is indeterminate.    TEAM CONF - AUGUST 6- TRANFERS CTG. GAIT UP  FEET CTG-MIN ASSIST. UBD MIN. LBD MOD. BATH MOD. SEVERE APHASIA. INCREASED RESISTANCE TO PARTICIPATE AT TIMES.   PUREE/HTL.  GOOD PO INTAKE. ADJUSTING MEDS FOR DIABETES MELLITUS.  BLADDER CONTINENT/INCONTIENT.   ELOS- 2 WEEKS.     TEAM JEFFREY - AUGUST 13 - DID GREAT WITH PT ON Saturday, FOLLOWING COMMANDS AND BATTING A BALL WITH ANOTHER PATIENT. YESTERDAY, VERY FRUSTRATED AND IRRITABLE.  FRUSTRATED BY APHASIA, TRYING TO TELL STAFF SOMETHING. WILL HOLD ON TO OBJECTS, REPEAT SINGLE WORD.   BED CTG. 4 STAIRS CTG.  GAIT 220 FEET CTG-SBA NO DEVICE.  TOILET TRANSFERS CTG-MIN.  GROOMING MIN.  UBD MIN ASSIST FOR BRA, LBD CTG-MIN. BATH CTG-MIN. COORDINATION RUE BETTER.  DYSPHAGIA - IMPROVED - LAI EDGAR NTHILLARY. DO NOT FEEL SHE WOULD TOLERATE E-STIM. RECEPTIVE LANGUAGE IMPROVED SLIGHTLY , POINTING TO OBJECTS. EXPRESSIVELY PERSEVERATIVE ON A SINGLE WORD, TRIES TO USE PICTURE BOARD TO COMMUNICATE. YES/NO NOT ACCURATE.  CONTINENT BOWEL AND BLADDER, TIMED VOIDS. SKIN INTACT. TYPICALLY GOOD INTAKE.  ELOS - 1.5 WEEKS    AUGUST 14 - In therapies - In OT, increased participation noted with  present   Transfers SBA/CTG  Walked from therapy gym to room without AD SBA and vc's for directions back to the room   300' outdoors on sidewalk, incline;  300', 180', 100' up on rehab unit. Pt was able to find her room when she got close to it  Bathing, dressing, grooming min assist. Toileting moderate assist.  Pt participated in using R hand to manipualte round pegs into peg board by color with MIN difficutly once pt caught on to the task. with 3D block recreation with R hand with 100% color match, 80% accuracy with block recreation  With SLP, patient was not able to effectively communicate her wants/needs but refused to go to therapy office by planting her heels while rolling in wc down the [place]; tx session completed in her room     August 15-The patient was reviewed with .  Discussed adding on an antidepressant with anxiolytic properties -Paxil-as it appears frustration from her aphasia with associated anxiety with the frustration affects her performance.  We will plan on starting Paxil 10 mg daily tomorrow and monitor response.  Reviewed with the patient's  that would not add a short acting anxiolytic (such as a benzodiazepine or Seroquel) as it may affect her cognitive performance.    August 16-Patient was reviewed with her daughter today including rehabilitation goals, discharge planning, and recent medication adjustment to try to help with her anxiety/frustration with her aphasia.  Reviewed with the daughter that on discussion with the team is felt that she would do better with her functional status/aphasia/anxiety if able to be discharged to home environment with more frequent interactions but if that is not feasible with the need to look at subacute options.    TEAM CONF - AUGUST 20 - GAIT CTG- FEET. NO DEVICE, WALKS TO AND FROM GYM.  ADLS CTG WITH CUING.  PLAYED ENTIRE GAME OF [employer] YESTERDAY. VARIABLE PERFORMANCE WITH SPEECH THERAPY 20-90% MATCHING WORDS TO PICTURES.    MECH SOFT, GROUND MEAT, NECTAR THICK LIQUIDS.   TYPICALLY CONTINENT BLADDER BUT INCONTINENT BOWEL. DID FINE LAST EVENING PER SISTER AND DID NOT TRY TO GET UP- WILL  DISCONTINUE SITTER. IN ROOM CLOSE TO NURSING STATION.   ELOS- FAMILY CONF LAST WEEK- WILL WORK TOWARD SUBACUTE PLACEMENT     August 23-upper body dressing set up, lower body dressing min assist.  Grooming supervision.  Transfers standby assist.  Ambulated 220 feet without a device contact-guard standby assist.  She spent in therapy this morning but not with physical therapy this afternoon     Barrier to discharge includes  Impaired cognitive-linguistic skills - work on receptive and expressive language and cognition.       Oj Avila MD  08/24/19  5:14 PM    Time:

## 2019-08-24 NOTE — SIGNIFICANT NOTE
08/24/19 1309   Rehab Treatment   Discipline occupational therapist   Reason Treatment Not Performed patient/family declined treatment  (Pt agitated and adamently refusing to get OOB with multiple attepmts.)   Recommendation   OT - Next Appointment 08/26/19

## 2019-08-24 NOTE — PROGRESS NOTES
Occupational Therapy: Branch    Physical Therapy: Branch    Speech Language Pathology:  Individual: 60 minutes.    Signed by: WHIT Lamb

## 2019-08-24 NOTE — PROGRESS NOTES
LOS: 24 days   Patient Care Team:  Eric Matta MD as PCP - General (General Practice)    Chief Complaint: Postop carotid endarterectomy    Subjective     This patient continues to perseverate with just no and yes.  She is very agitated but it is unclear how much she can actually understand.  She obviously has a significant expressive a fascia.    Interval History:     History taken from: patient chart family RN    Objective      The patient has good movement in all extremities.  Her biggest problem is the aphasia.    Vital Signs  Temp:  [98 °F (36.7 °C)-98.4 °F (36.9 °C)] 98.4 °F (36.9 °C)  Heart Rate:  [65-70] 70  Resp:  [18] 18  BP: (122-131)/(58-68) 122/63       Results Review:     I reviewed the patient's new clinical results.  She is afebrile and her labs look okay.      Assessment/Plan       Stroke (cerebrum) (CMS/HCC)      I had a very long discussion with the patient's  and also with her daughter.  I talked to her daughter on the phone.  I answered a number of questions including her overall prognosis.  I explained that I am pretty confident she will improve further from here but it is impossible to know the endpoint.  They seemed understand all this fairly well.      Jordin Harris MD  08/24/19  11:24 AM

## 2019-08-24 NOTE — THERAPY TREATMENT NOTE
Inpatient Rehabilitation - Speech Language Pathology Treatment Note    [facility]       Patient Name: Darby Workman  : 1945  MRN: [id number]    Today's Date: 2019           Admit Date: 2019      Visit Dx:      No diagnosis found.    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)   • Right-sided headache   • Acute ischemic stroke (CMS/HCC)   • Embolic stroke (CMS/HCC)   • Cerebral infarction due to stenosis of left carotid artery (CMS/HCC)   • Anticoagulated by anticoagulation treatment   • Stroke (cerebrum) (CMS/HCC)          Therapy Treatment    Evaluation/Coping    Evaluation/Treatment Time and Intent  Document Type: therapy note (daily note) (19 1300 : Mariposa Zuluaga MA,CCC-SLP)    Vitals/Pain/Safety    Pain Scale: FACES Pre/Post-Treatment  Pain: FACES Scale, Pretreatment: 0-->no hurt (19 1300 : Mariposa Zuluaga MA,CCC-SLP)  Pain: FACES Scale, Post-Treatment: 0-->no hurt (19 1300 : Mariposa Zuluaga MA,CCC-SLP)    Cognition/Communication         Oral Motor/Eating         Mobility/Basic Activities/Instrumental Activities/Motor/Modality                   ROM/MMT                   Sensory/Myotome/Dermatome/Edema               Posture/Balance/Special Tests/Exercise/Transportation/Sexual Function                   Orthotics/Residual Limb/Prosthetic Management              Outcome Summary         EDUCATION    The patient has been educated in the following areas:     Communication Impairment.    SLP Recommendation and Plan                                                          SLP GOALS     Row Name 19 1300 19 1115 19 0830       Words/Phrases/Sentences Goal 1 (SLP)    Improve Ability to Comprehend Words/Phrases/Sentences Through: Goal 1 (SLP)  identify objects, field of  -NR  --  --    Progress  (Ability to Contruct Words/Phrases/Sentences Goal 1, SLP)  0%  -NR  with minimal cues (75-90%)  -KB  with moderate cues (50-74%);with maximum cues (25-49%)  -KB    Progress/Outcomes (Identify Objects and Pictures Goal 1, SLP)  continuing progress toward goal  -NR  good progress toward goal;goal ongoing  -KB  goal ongoing  -KB    Comment (Words/Phrases/Sentences Goal 1, SLP)  Total assist needed to Id objects in a field of two, pt agitated  -NR   88% identifying picture from fo2 given object function  -KB  60% identifying named body parts, 80% with physical model; 50% identifying pictured items by name in fo2  -KB       Comprehend Questions Goal 1 (SLP)    Progress (Ability to Comprehend Questions Goal 1, SLP)  --  with maximum cues (25-49%)  -KB  --    Progress/Outcomes (Comprehend Questions Goal 1, SLP)  --  goal ongoing  -KB  --    Comment (Comprehend Questions Goal 1, SLP)  --  40% answering basic personal/environmental yes/no questions with visual response options, shortened questions and repetitions needed, 60% with visual cues   -KB  --       Reading Comprehension at Word Level Goal 1 (SLP)    Progress (Reading Comprehension at Word Level Goal 1, SLP)  --  with minimal cues (75-90%)  -KB  with maximum cues (25-49%)  -KB    Progress/Outcomes (Reading Comprehension at Word Level Goal 1, SLP)  --  good progress toward goal;goal ongoing  -KB  goal ongoing  -KB    Comment (Reading Comprehension at Word Level Goal 1, SLP)  --  75% matching pictured object to written word in fo2  -KB  25% matching pictured object to written word in fo2  -KB       Word Retrieval Skills Goal 1 (SLP)    Progress (Word Retrieval Skills Goal 1, SLP)  --  with maximum cues (25-49%)  -KB  --    Progress/Outcomes (Word Retrieval Goal 1, SLP)  --  goal ongoing  -KB  --    Comment (Word Retrieval Goal 1, SLP)  --  30%, 50% ,30% close approximations when couting 1-10 with a model  -KB  --       Word Retrieval Skills Goal 2 (SLP)    Progress  (Word Retrieval Skills Goal 2, SLP)  --  with maximum cues (25-49%)  -KB  --    Progress/Outcomes (Word Retrieval Goal 2, SLP)  --  goal ongoing  -KB  --    Comment (Word Retrieval Goal 2, SLP)  --  0% confrontation naming of common pictured objects, 30% with direct verbal model  -KB  --       Graphic Expression of Shapes, Letters, Numbers Goal 1 (SLP)    Improve Graphic Expression of Shapes, Letters, and Numbers Goal 1 (SLP)  copy shapes, numbers, and letters  -NR  --  --    Progress/Outcomes (Graphic Expression of Shapes, Letters, and Numbers Goal 1, SLP)  continuing progress toward goal  -NR  --  --    Comment (Graphic Expression of Shapes, Letters, and Numbers Goal 1, SLP)  Pt able to copy single printed words c extra time  and reduced legibility.  Pt able to select a crayon from a field of two intermittently and color portions of a pictured scene.  Pt was very agitated for both sessions.  -NR  --  --       Graphic Expression of Words Goal 1 (SLP)    Progress (Graphic Expression of Single Words Goal 1, SLP)  --  --  with moderate cues (50-74%)  -KB    Progress/Outcomes (Graphic Expression of Single Words Goal 1, SLP)  --  --  goal ongoing  -KB    Comment (Graphic Expression of Single Words Goal 1, SLP)  --  --  50% accuracy writing her name to written prompt, patient became frustrated on second attempt and decreased effort was noted  -KB       Planning and Execution of Connected Speech Goal 1 (SLP)    Progress (Planning and Execution of Connected Speech Goal 1, SLP)  --  with maximum cues (25-49%)  -KB  with 1:1 supervision/constant cues  -KB    Progress/Outcomes (Planning and Execution of Connected Speech Goal 1, SLP)  --  goal ongoing  -KB  goal ongoing  -KB    Comment (Planning and Execution of Connected Speech Goal 1, SLP)  --  20% imitating bilablial CV syllables, 30% with additional max visual/verbal cues  -KB  77% imitating functional CV words  -KB    Row Name 08/22/19 1000 08/22/19 5994           Words/Phrases/Sentences Goal 1 (SLP)    Progress (Ability to Contruct Words/Phrases/Sentences Goal 1, SLP)  independently (over 90% accuracy)  -KB  with moderate cues (50-74%)  -KB     Progress/Outcomes (Identify Objects and Pictures Goal 1, SLP)  goal ongoing  -KB  goal ongoing  -KB     Comment (Words/Phrases/Sentences Goal 1, SLP)  70% identifying named body parts, dirent modeling not effective to increase performance  -KB  16% identifying named letter/number in fo5, 83% when choices reduced to fo2-3  -KB        Reading Comprehension at Word Level Goal 1 (SLP)    Progress (Reading Comprehension at Word Level Goal 1, SLP)  independently (over 90% accuracy)  -KB  with moderate cues (50-74%)  -KB     Progress/Outcomes (Reading Comprehension at Word Level Goal 1, SLP)  good progress toward goal;goal ongoing  -KB  goal ongoing  -KB     Comment (Reading Comprehension at Word Level Goal 1, SLP)  80% identifying written word in fo2 given dictated word  -KB  67% matching written word to picture, 78% with verbal cues  -KB        Word Retrieval Skills Goal 1 (SLP)    Progress (Word Retrieval Skills Goal 1, SLP)  with moderate cues (50-74%)  -KB  --     Progress/Outcomes (Word Retrieval Goal 1, SLP)  goal ongoing  -KB  --     Comment (Word Retrieval Goal 1, SLP)  20% predictable phrase completion with moderate verbal cues  -KB  --        Word Retrieval Skills Goal 2 (SLP)    Progress (Word Retrieval Skills Goal 2, SLP)  --  with 1:1 supervision/constant cues  -KB     Progress/Outcomes (Word Retrieval Goal 2, SLP)  --  goal ongoing  -KB     Comment (Word Retrieval Goal 2, SLP)  --  20%, 30%, 20% counting 1-10 with verbal model and visual support  -KB        Graphic Expression of Shapes, Letters, Numbers Goal 1 (SLP)    Progress (Graphic Expression of Shapes, Letters, and Numbers Goal 1, SLP)  independently (over 90% accuracy)  -KB  --     Progress/Outcomes (Graphic Expression of Shapes, Letters, and Numbers Goal 1, SLP)  goal  ongoing  -KB  --     Comment (Graphic Expression of Shapes, Letters, and Numbers Goal 1, SLP)  60% writing dictated letters  -KB  --        Graphic Expression of Words Goal 1 (SLP)    Progress (Graphic Expression of Single Words Goal 1, SLP)  --  with minimal cues (75-90%);with moderate cues (50-74%)  -KB     Progress/Outcomes (Graphic Expression of Single Words Goal 1, SLP)  --  good progress toward goal;goal ongoing  -KB     Comment (Graphic Expression of Single Words Goal 1, SLP)  --  65% accurate letter formation when copying written words; 90% writing her name to written prompt  -KB        Planning and Execution of Connected Speech Goal 1 (SLP)    Progress (Planning and Execution of Connected Speech Goal 1, SLP)  with moderate cues (50-74%)  -KB  --     Progress/Outcomes (Planning and Execution of Connected Speech Goal 1, SLP)  goal ongoing  -KB  --     Comment (Planning and Execution of Connected Speech Goal 1, SLP)  60% imitation of functional CV words, 90% with additional repetitions and visual cues  -KB  --        Additional Goal 1 (SLP)    Barriers (Additional Goal 1, SLP)  100% matching like 3-digit number in fo4  -KB  100% filling in blanks given written numbers 1-10 and some missig numbers  -KB       User Key  (r) = Recorded By, (t) = Taken By, (c) = Cosigned By    Initials Name Provider Type    NR Mariposa Zuluaga MA,CCC-SLP Speech and Language Pathologist    KB Bruton, Katherine L Speech and Language Pathologist             SLP Outcome Measures (last 72 hours)      SLP Outcome Measures     Row Name 08/24/19 1300             Adult FCM Scores    Verbal Expression FCM Score  2  -NR        User Key  (r) = Recorded By, (t) = Taken By, (c) = Cosigned By    Initials Name Effective Dates    NR Mariposa Zuluaga MA,[facility]-SLP 06/08/18 -               Time Calculation:       Time Calculation- SLP     Row Name 08/24/19 1535 08/24/19 1534          Time Calculation- SLP    SLP Start Time  1330  -NR  0930  -NR     SLP Stop  Time  1400  -NR  1000  -NR     SLP Time Calculation (min)  30 min  -NR  30 min  -NR     SLP Received On  --  08/24/19  -NR       User Key  (r) = Recorded By, (t) = Taken By, (c) = Cosigned By    Initials Name Provider Type    NR Mariposa Zuluaga MA,CCC-SLP Speech and Language Pathologist            Therapy Charges for Today     Code Description Service Date Service Provider Modifiers Qty    [id number] HC ST TREATMENT SPEECH 4 8/24/2019 Mariposa Zuluaga MA,CCC-SLP GN 1               ADULT NOMS (last 72 hours)      Adult NOMS     Row Name 08/24/19 1300                   Adult FCM Scores    Verbal Expression FCM Score  2  -NR          User Key  (r) = Recorded By, (t) = Taken By, (c) = Cosigned By    Initials Name Effective Dates    NR Mariposa Zuluaga MA,CCC-SLP 06/08/18 -                      Mariposa Zuluaga MA,CCC-SLP  8/24/2019

## 2019-08-24 NOTE — PLAN OF CARE
Problem: Skin Injury Risk (Adult)  Goal: Skin Health and Integrity  Outcome: Ongoing (interventions implemented as appropriate)   08/24/19 0151   Skin Injury Risk (Adult)   Skin Health and Integrity making progress toward outcome       Problem: Fall Risk (Adult)  Goal: Absence of Fall  Outcome: Ongoing (interventions implemented as appropriate)   08/24/19 0151   Fall Risk (Adult)   Absence of Fall making progress toward outcome       Problem: Patient Care Overview  Goal: Plan of Care Review  Outcome: Ongoing (interventions implemented as appropriate)   08/24/19 0151   Patient Care Overview   IRF Plan of Care Review progress ongoing, continue   Progress, Functional Goals demonstrating adequate progress   Coping/Psychosocial   Plan of Care Reviewed With patient   OTHER   Outcome Summary Pt. is very forgetful and confused. Denied any pain, numbness, or tingling. Takes Meds crushed in AS/Pudding. Expressive aphasia and dysarthria. Slurring of words. Only speak Yes/No. Flat and cooperative. Impulsive sometimes. Bladder scan Q4-6 hrs. IC applied PRN. AC&HS. BG is 138. HumaLOG no given at night. Contact precautions maintained. Sitter in room. No further issues to note at this time.     Goal: Coping Plan  Outcome: Ongoing (interventions implemented as appropriate)   08/24/19 0151   Coping Plan   Demonstration of Effective Coping Strategies demonstrating adequate progress       Problem: Stroke (IRF) (Adult)  Goal: Promote Optimal Functional [place]  Outcome: Ongoing (interventions implemented as appropriate)   08/24/19 0151   Stroke (IRF) (Adult)   Promote Optimal Functional [place] demonstrating adequate progress

## 2019-08-24 NOTE — PROGRESS NOTES
Inpatient Rehabilitation Functional Measures Assessment    Functional Measures  KATI Eating:  [facility] Grooming: [facility] Bathing:  [facility] Upper Body Dressing:  [facility] Lower Body Dressing:  [facility] Toileting:  [facility] Bladder Management  Level of Assistance:  [place]  Frequency/Number of Accidents this Shift:  [facility] Bowel Management  Level of Assistance: [place]  Frequency/Number of Accidents this Shift: [facility] Bed/Chair/Wheelchair Transfer:  [facility] Toilet Transfer:  [facility] Tub/Shower Transfer:  [place]    Previously Documented Mode of Locomotion at Discharge: Field  KATI Expected Mode of Locomotion at Discharge: [facility] Walk/Wheelchair:  [facility] Stairs:  [facility] Comprehension:  Both ( auditory and visual) modes of comprehension are used  equally. Comprehension Score = 6, Modified [place].  Patient comprehends  complex/abstract information in their primary language with only mild  difficulty. Very forgetful and confused at times  KATI Expression:  Both ( vocal and non-vocal) modes of expression are used  equally. Expression Score = 6,  Modified [place]. Patient expresses  complex/abstract information in their primary language with only mild  difficulty. Very forgetful and confused at times. Expressive very global aphasia  and dysarthria. Slurring of words. Only speak Yes/No.  KATI Social Interaction:  Social Interaction Score = 1, Total Assistance. Patient  interacts appropriately less than 25% of the time.  Patient requires total  assistance (directing all or almost all of the time) for the following  behavior(s): Non-interactive. Severely withdrawn. Very forgetful and confused at  times .  KATI Problem Solving:  Patient does not make appropriate decisions in order to  solve complex problems without assistance from a helper. Problem Solving Score =  3, Moderate Direction. Patient makes appropriate decisions in order to solve  routine problems 50-74%  of the time. Patient requires moderate/some direction  for the following behavior(s): Difficulty with self-correction. Exhibits poor  planning. Inability to follow multi-step commands. Poor judgment. Very forgetful  and confused at times  KATI Memory:  Memory Score = 1, Total Assistance. Patient recognizes and  remembers less than 25% of the time. Patient requires total assistance  (prompting all or almost all of the time) for memory for the following:  Inability to follow multi-step commands. Disoriented to person, place, time or  situation. Unaware of daily routine. Very forgetful and confused at times    Therapy Mode Minutes  Occupational Therapy: [facility]  Physical Therapy: [facility]  Speech Language Pathology:  [facility]    Signed by: Dimitry Baldwin RN

## 2019-08-25 LAB
GLUCOSE BLDC GLUCOMTR-MCNC: 110 MG/DL (ref 70–130)
GLUCOSE BLDC GLUCOMTR-MCNC: 118 MG/DL (ref 70–130)
GLUCOSE BLDC GLUCOMTR-MCNC: 120 MG/DL (ref 70–130)
GLUCOSE BLDC GLUCOMTR-MCNC: 91 MG/DL (ref 70–130)

## 2019-08-25 PROCEDURE — 82962 GLUCOSE BLOOD TEST: CPT

## 2019-08-25 RX ADMIN — NYSTATIN 500000 UNITS: 100000 SUSPENSION ORAL at 16:49

## 2019-08-25 RX ADMIN — NEBIVOLOL HYDROCHLORIDE 20 MG: 10 TABLET ORAL at 21:13

## 2019-08-25 RX ADMIN — NYSTATIN 500000 UNITS: 100000 SUSPENSION ORAL at 09:12

## 2019-08-25 RX ADMIN — APIXABAN 5 MG: 5 TABLET, FILM COATED ORAL at 09:13

## 2019-08-25 RX ADMIN — GLIPIZIDE 5 MG: 5 TABLET ORAL at 16:48

## 2019-08-25 RX ADMIN — BRIMONIDINE TARTRATE 1 DROP: 2 SOLUTION OPHTHALMIC at 21:14

## 2019-08-25 RX ADMIN — METFORMIN HYDROCHLORIDE 1000 MG: 1000 TABLET ORAL at 09:12

## 2019-08-25 RX ADMIN — DORZOLAMIDE HYDROCHLORIDE 1 DROP: 20 SOLUTION/ DROPS OPHTHALMIC at 21:14

## 2019-08-25 RX ADMIN — ATORVASTATIN CALCIUM 80 MG: 80 TABLET, FILM COATED ORAL at 21:13

## 2019-08-25 RX ADMIN — DORZOLAMIDE HYDROCHLORIDE 1 DROP: 20 SOLUTION/ DROPS OPHTHALMIC at 09:12

## 2019-08-25 RX ADMIN — ASPIRIN 325 MG: 325 TABLET ORAL at 09:13

## 2019-08-25 RX ADMIN — NYSTATIN 500000 UNITS: 100000 SUSPENSION ORAL at 21:12

## 2019-08-25 RX ADMIN — LANSOPRAZOLE 30 MG: KIT at 07:42

## 2019-08-25 RX ADMIN — LANSOPRAZOLE 30 MG: KIT at 16:48

## 2019-08-25 RX ADMIN — HYDRALAZINE HYDROCHLORIDE 50 MG: 50 TABLET, FILM COATED ORAL at 07:42

## 2019-08-25 RX ADMIN — NYSTATIN 500000 UNITS: 100000 SUSPENSION ORAL at 11:55

## 2019-08-25 RX ADMIN — BRIMONIDINE TARTRATE 1 DROP: 2 SOLUTION OPHTHALMIC at 09:12

## 2019-08-25 RX ADMIN — METFORMIN HYDROCHLORIDE 1000 MG: 1000 TABLET ORAL at 16:49

## 2019-08-25 RX ADMIN — PAROXETINE HYDROCHLORIDE 10 MG: 10 TABLET, FILM COATED ORAL at 09:15

## 2019-08-25 RX ADMIN — AMLODIPINE BESYLATE 5 MG: 5 TABLET ORAL at 21:12

## 2019-08-25 RX ADMIN — NEBIVOLOL HYDROCHLORIDE 20 MG: 10 TABLET ORAL at 09:12

## 2019-08-25 RX ADMIN — AMLODIPINE BESYLATE 5 MG: 5 TABLET ORAL at 09:13

## 2019-08-25 RX ADMIN — HYDRALAZINE HYDROCHLORIDE 50 MG: 50 TABLET, FILM COATED ORAL at 14:13

## 2019-08-25 RX ADMIN — LOSARTAN POTASSIUM: 50 TABLET, FILM COATED ORAL at 09:13

## 2019-08-25 RX ADMIN — POTASSIUM CHLORIDE 20 MEQ: 1.5 POWDER, FOR SOLUTION ORAL at 09:12

## 2019-08-25 RX ADMIN — APIXABAN 5 MG: 5 TABLET, FILM COATED ORAL at 21:13

## 2019-08-25 RX ADMIN — GLIPIZIDE 5 MG: 5 TABLET ORAL at 07:42

## 2019-08-25 NOTE — NURSING NOTE
Pt bladder scan 637ml, void 650ml. No IC. Pt was up to BR to void and refused to leave BR. x3 staff members to try to assist pt back to bed. Pt refused and mildly combative with staff physically holding herself in the doorway. Sitter stayed with pt in BR and pt eventually willing to transfer back to bed after spilling water on her pants and wanted to change clothing.

## 2019-08-25 NOTE — PROGRESS NOTES
Inpatient Rehabilitation Functional Measures Assessment    Functional Measures  KATI Eating:  [facility] Grooming: [facility] Bathing:  [facility] Upper Body Dressing:  [facility] Lower Body Dressing:  [facility] Toileting:  [facility] Bladder Management  Level of Assistance:  I[place]  Frequency/Number of Accidents this Shift:  [facility] Bowel Management  Level of Assistance: [place]  Frequency/Number of Accidents this Shift: [facility] Bed/Chair/Wheelchair Transfer:  [facility] Toilet Transfer:  [facility] Tub/Shower Transfer:  Ideal    Previously Documented Mode of Locomotion at Discharge: Field  KATI Expected Mode of Locomotion at Discharge: [facility] Walk/Wheelchair:  [facility] Stairs:  [facility] Comprehension:  Auditory comprehension is the usual mode. Patient does not  comprehend complex/abstract information in their primary language without  assistance from a helper. Comprehension Score = 2, Maximal Prompting. Patient  comprehends basic daily needs 25-49% of the time. Patient understands simple  information via single words or gestures. Requires maximal/a lot of prompting  (most of the time). No assistive devices were required. difficult to assess  KATI Expression:  Non-vocal expression is the usual mode. Patient does not  express complex/abstract information in their primary language without a helper.  Expression Score = 2, Maximal Prompting. Patient expresses basic daily needs  25-49% of the time. Patient uses only single words or gestures and requires  maximal/a lot of prompting (most of the time). No assistive devices were  required.  KATI Social Interaction:  Social Interaction Score = 1, Total Assistance. Patient  interacts appropriately less than 25% of the time.  Patient requires total  assistance (directing all or almost all of the time) for the following  behavior(s): Non-cooperative. Belligerent. Physical attack.  KATI Problem Solving:  Patient does not make appropriate decisions  in order to  solve complex problems without assistance from a helper. Problem Solving Score =  1, Total Assistance. Patient makes appropriate decisions in order to solve  routine problems less than 25% of the time. Patient requires total direction for  the following behavior(s): Decreased awareness of performance. Difficulty  completing tasks. Difficulty with self-correction. Difficulty with  self-monitoring. Impulsivity. Poor judgment. Inability to follow simple  commands. sitter required .  KATI Memory:  Activity was not observed.    Therapy Mode Minutes  Occupational Therapy: [facility]  Physical Therapy: [facility]  Speech Language Pathology:  [facility]    Signed by: Di Saldaña RN

## 2019-08-25 NOTE — PROGRESS NOTES
Inpatient Rehabilitation Functional Measures Assessment    Functional Measures  KATI Eating:  [facility] Grooming: [facility] Bathing:  [facility] Upper Body Dressing:  [facility] Lower Body Dressing:  [facility] Toileting:  [facility] Bladder Management  Level of Assistance:  [place]  Frequency/Number of Accidents this Shift:  [facility] Bowel Management  Level of Assistance: [place]  Frequency/Number of Accidents this Shift: [facility] Bed/Chair/Wheelchair Transfer:  [facility] Toilet Transfer:  [facility] Tub/Shower Transfer:  [place]    Previously Documented Mode of Locomotion at Discharge: Field  KATI Expected Mode of Locomotion at Discharge: [facility] Walk/Wheelchair:  [facility] Stairs:  [facility] Comprehension:  Auditory comprehension is the usual mode. Patient does not  comprehend complex/abstract information in their primary language without  assistance from a helper. Comprehension Score = 2, Maximal Prompting. Patient  comprehends basic daily needs 25-49% of the time. Patient understands simple  information via single words or gestures. Requires maximal/a lot of prompting  (most of the time). Patient requires the following assistive device(s): Glasses.    KATI Expression:  Vocal expression is the usual mode. Patient does not express  complex/abstract information in their primary language without a helper.  Expression Score = 2, Maximal Prompting. Patient expresses basic daily needs  25-49% of the time. Patient uses only single words or gestures and requires  maximal/a lot of prompting (most of the time). Patient requires the following  assistive device(s): Communication board/device.  KATI Social Interaction:  Activity was not observed.  KATI Problem Solving:  Activity was not observed.  KATI Memory:  Activity was not observed.    Therapy Mode Minutes  Occupational Therapy: [place]  Physical Therapy: [place]  Speech Language Pathology:  [place]    Signed by: Leslie Mcdonald RN

## 2019-08-25 NOTE — PLAN OF CARE
Problem: Skin Injury Risk (Adult)  Goal: Skin Health and Integrity  Outcome: Ongoing (interventions implemented as appropriate)      Problem: Fall Risk (Adult)  Goal: Absence of Fall  Outcome: Ongoing (interventions implemented as appropriate)      Problem: Patient Care Overview  Goal: Plan of Care Review  Outcome: Ongoing (interventions implemented as appropriate)   08/24/19 1027 08/25/19 1017   Patient Care Overview   IRF Plan of Care Review progress ongoing, continue --    OTHER   Outcome Summary --  Pt remains somewhat irritable and has sitter at the bedside for safety. Awaiting ECF placement.      Goal: Individualization and Mutuality  Outcome: Ongoing (interventions implemented as appropriate)    Goal: Discharge Needs Assessment  Outcome: Ongoing (interventions implemented as appropriate)    Goal: Home Safety Plan  Outcome: Ongoing (interventions implemented as appropriate)    Goal: Coping Plan  Outcome: Ongoing (interventions implemented as appropriate)    Goal: Community Reintegration Plan  Outcome: Ongoing (interventions implemented as appropriate)      Problem: Stroke (IRF) (Adult)  Goal: Promote Optimal Functional [place]  Outcome: Ongoing (interventions implemented as appropriate)

## 2019-08-25 NOTE — PROGRESS NOTES
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  Continues with improved strength on the right side arm more than leg.  Patient seen in bed this afternoon.  Did not identify any new complaint but limited by her aphasia.  Still appears frustrated by her aphasia.      History taken from: patient chart RN    Objective     Vital Signs  Temp:  [96.7 °F (35.9 °C)-98.4 °F (36.9 °C)] 96.7 °F (35.9 °C)  Heart Rate:  [64-80] 80  Resp:  [18] 18  BP: (120-144)/(60-77) 120/60    Objective  Physical Exam  MENTAL STATUS -  AWAKE / ALERT.  Anxious but less so than yesterday  HEENT-    LUNGS - normal respirations.   Clear to auscultation  HEART- regular  ABD -   normoactive bowel sounds.  Soft nontender.  EXT - no edema  NEURO -alert.  Aphasia.      MOTOR EXAM -takes resistance in the bilateral upper extremities and lower extremities left more then right         Results Review:     I reviewed the patient's new clinical results.     CT HEAD - AUGUST 11, 2019  FINDINGS:  Old appearing lacunar type infarcts are again noted about the  right thalamus and basal ganglia regions. There are stable areas of  encephalomalacia in the left parietal and occipital lobes medially.  Generalized atrophy. There are moderately extensive scattered areas of  decreased density in the white matter likely related to chronic ischemic  gliotic changes.    No hydrocephalus.    Glucose    Date/Time Value Ref Range Status   08/25/2019 1553 120 70 - 130 mg/dL Final   08/25/2019 1146 118 70 - 130 mg/dL Final   08/25/2019 0716 110 70 - 130 mg/dL Final   08/24/2019 2200 124 70 - 130 mg/dL Final   08/24/2019 1638 103 70 - 130 mg/dL Final   08/24/2019 1135 150 (H) 70 - 130 mg/dL Final   08/24/2019 0612 117 70 - 130 mg/dL Final   08/23/2019 2031 138 (H) 70 - 130 mg/dL Final     Results from last 7 days   Lab Units 08/23/19  0631 08/21/19  0605 08/19/19  0739   WBC 10*3/mm3 4.21 4.86 5.32   HEMOGLOBIN g/dL 9.5* 9.8* 10.3*   HEMATOCRIT % 30.6* 30.6* 33.7*   PLATELETS 10*3/mm3 306 324 382       Ref. Range 5/22/2019 05:44 5/22/2019 11:34 7/9/2019 08:25 7/18/2019 04:49 7/19/2019 05:05 7/23/2019 05:56 8/1/2019 06:48   Hemoglobin Latest Ref Range: 12.0 - 15.9 g/dL 10.8 (L)  10.6 (L) 8.5 (L) 9.7 (L) 9.3 (L) 7.4 (L)   Hematocrit Latest Ref Range: 34.0 - 46.6 % 35.1  33.4 (L) 26.2 (L) 29.9 (L) 28.6 (L) 23.6 (L)     Results from last 7 days   Lab Units 08/23/19  0631 08/21/19  0605 08/19/19  0739   SODIUM mmol/L 137 138 137   POTASSIUM mmol/L 3.6 3.5 3.8   CHLORIDE mmol/L 101 99 101   CO2 mmol/L 28.4 28.1 27.3   BUN mg/dL 17 19 12   CREATININE mg/dL 0.73 0.65 0.63   CALCIUM mg/dL 9.1 8.8 8.9   GLUCOSE mg/dL 108* 117* 168*         Ref. Range 8/1/2019 06:47   25 Hydroxy, Vitamin D Latest Ref Range: 30.0 - 100.0 ng/ml 21.8 (L)       Ref. Range 8/1/2019 06:47   Total Cholesterol Latest Ref Range: 0 - 200 mg/dL 105   HDL Cholesterol Latest Ref Range: 40 - 60 mg/dL 40   LDL Cholesterol  Latest Ref Range: 0 - 100 mg/dL 57   VLDL Cholesterol Latest Ref Range: 5 - 40 mg/dL 8   Triglycerides Latest Ref Range: 0 - 150 mg/dL 40   Medication Review: done  Scheduled Meds:    amLODIPine 5 mg Oral BID - RT   apixaban 5 mg Oral Q12H   aspirin 325 mg Oral Daily   atorvastatin 80 mg Oral Nightly   brimonidine 1 drop Both Eyes BID   dorzolamide 1 drop Both Eyes BID   glipiZIDE 5 mg Oral BID AC   hydrALAZINE 50 mg Oral Q8H   insulin  lispro 0-7 Units Subcutaneous 4x Daily With Meals & Nightly   lansoprazole 30 mg Oral BID AC   losartan-HCTZ (HYZAAR) 100-12.5 combo dose  Oral Daily   metFORMIN 1,000 mg Oral BID With Meals   nebivolol 20 mg Oral BID   nystatin 5 mL Swish & Spit 4x Daily   PARoxetine 10 mg Oral Daily   potassium chloride 20 mEq Oral Daily With Breakfast   vitamin D 50,000 Units Oral Q7 Days     Continuous Infusions:   PRN Meds:.•  acetaminophen  •  aluminum-magnesium hydroxide-simethicone  •  dextrose  •  dextrose  •  glucagon (human recombinant)  •  nitroglycerin  •  OLANZapine      Assessment/Plan       Stroke (cerebrum) (CMS/[facility])      Assessment & Plan  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  If add Namenda, will probably discontinue amantadine as she continues alert.  August 11-she has some increased irritability at times.  This may be related to the underlying stroke deficits itself.  She does not appear to have any dysuria, no odor to her urine.  Staff reports that for the most part she is eating okay.  Will check a follow-up CT of the head to assess for any interval visual changes.  She is on aspirin and Eliquis for stroke prophylaxis.  She had noticeably improved alertness when amantadine was started.  She is readily alert now.  This may be related to natural recovery in terms of her level of arousal at this point.  Current plan is to discontinue the amantadine to see if that helps with her irritability and start  on Namenda for aphasia.  August 12 -  Patient with increased restlessness at times.   Continues aphasia. Not able to identify any complaint.  Appears anxious today. No change on CT head yesterday. Started on Namenda for aphasia on 8/12/19.   August 13- will continue to monitor on recent med adjustments   August 15-She continues  with expressive language deficits.    Again appears anxious related to her aphasia and difficulty expressing herself.    She will be able to get occasional single word for the examiner.  She does follow instructions.  The patient was reviewed with .  Discussed adding on an antidepressant with anxiolytic properties -Paxil-as it appears frustration from her aphasia with associated anxiety with the frustration affects her performance.  We will plan on starting Paxil 10 mg daily tomorrow and monitor response.  Reviewed with the patient's  that would not add a short acting anxiolytic (such as a benzodiazepine or Seroquel) as it may affect her cognitive performance.    August 16-started Paxil 10 mg daily  August 19 - Increased restlessness Friday night - ? Related to UTI vs initiation of Paxil. On Cefdinir for GNR pending ID &Sensitivity.  August 20 -urine culture with E. coli ESBL.  Given her aphasia not able to obtain information regarding dysuria.  As the urinalysis was ordered as part of work-up for increased restlessness this weekend, would have to treat as symptomatic although could be asymptomatic bacteriuria.  Will place on ertapenem 1 g IV daily for 3-5 days.  August 21 - increased restlessness/anxiety today. Labs without any acute change. Blood glucose okay this AM. BP pattern okay. No gross motor changes. Follow on current regimen for now.   Addendum-Patient continued today with  increased restlessness/anxiety/refusing aspects of nursing care. She had had a good day in therapies yesterday. She is calmer now with family present. Did allow IV antibiotic to be infused but has not taken PO meds or eaten dinner yet.   Discussed with patient's /children will need to hold on planned discharge tomorrow given her mood/behavior today.   I do not feel Namenda (started as trial for aphasia) is related as had episodes of restlessness prior to starting, but will discontinue for now so  slate with  "psychoactive medications. Continue Paxil for now. Will consult Psychiatry for input.  August 22-patient more cooperative today, taking medications and will participate in therapies.  Still perseverates on \"no\".  Still with some anxiety related to her aphasia but less than yesterday.  Seen by psychiatry and to continue with Paxil 10 mg daily.  Also order written for Zyprexa 5 mg IM every 8 hours as needed agitation.  To observe on current regimen.    Aphasia -  August 12 - trial of Namenda  August 16-continue to observe on Namenda 5 mg daily for her aphasia and may possibly titrate up next week  August 20-titrate up on Namenda to 5 mg twice a day  August 22-discontinued Namenda.  Do not feel related to her anxiety/restlessness as it was present prior to starting but discontinued to avoid any additional psychoactive medications that might add to it.  Had not seen any improvement in her aphasia with the very brief trial.    Dysphagia-Cortrak feeding tube discontinued on July 31 and started on puréed/honey thick liquid diet with strategies  August 7-advance to fork mash nectar thick liquid diet, consistent carbohydrate.  August 14 - repeat VFSS to assess for advancing to thin liquids  August 15-Video fluoroscopic swallow study today-mechanical soft, no mixed consistency, nectar thick liquid, water protocol between meals, no straws.    History of multiple bilateral strokes and left central retinal artery occlusion    History of left greater than right internal carotid artery stenosis-status post left internal carotid artery stent July 17, 2019    History of hypertension -  Hyzaar 100-25. August 4 - Bystolic increased to 20 mg daily to 20 mg bid.  August 8 - BP still runs high. On discharge in May 2019 was on Hyzaar 100-25 mg daily, Bystolic 20 mg daily, amlodipine 10 mg daily, Hydralazine 50 mg q 8 hours.  Will add Hydralazine initially 10 mg po q 8 hours and titrate up as needed.   August 9-systolic blood pressure " elevated last night and early this morning but better this afternoon at 122-130.  Continue to follow recent addition of hydralazine and titrate up as needed  August 10-systolic blood pressure 175 this afternoon, range otherwise 122-142.  Will titrate up on hydralazine to 20 mg every 8 hours.  August 11-hypertension overall appears improved.  Will titrate up further to 25 mg every 8 hours.  August 12 - add amlodipine 5 mg daily.   August 14 - titrate up on hydralazine to 50 mg q 8 hours  August 15-blood pressure improved this afternoon with systolic blood pressure in the 120s-follow on current regimen  August 16-blood pressure range 110////59-will continue to monitor on present regimen  August 19 - /74 early AM, later 136/71 - increase amlodipine to 5 mg bid    History of diabetes mellitus -Lantus/glipizide (home medication metformin 1000 mg twice daily and glipizide 10 mg twice daily)  August 1-blood sugars were low yesterday afternoon after tube feeds discontinued.  Held glipizide last night and this morning and Lantus last night.  Blood sugar elevated at noontime today.  Will receive resume glipizide at a lower dose of 5 mg twice a day with meals.  Continue sliding scale insulin coverage.  She also is on metformin at home.  August 5-add back metformin initially at 500 mg twice a day and continue glipizide 5 mg twice a day, both one half of previous home dose, titrate up as needed.  August 7-titrate up metformin to 850 mg twice a day.  Add consistent carbohydrate restriction to diet.  August 9-increase metformin to 1000 g twice a day.  August 10-blood glucose 340 last evening but 150-114-178 so far today  August 11-continue to follow blood sugar pattern and may increase glipizide further as current dose glipizide 5 mg twice a day along with metformin 1000 mg twice a day  August 14 - blood glucose  past 24 hours - monitor pattern.  August 15-blood glucose  [phone]--[phone]-continue to follow pattern.  Will change sliding scale insulin coverage to Humalog at a lower dose.  She was started on the regular insulin sliding scale when she was on tube feeds.  August 16 - recent blood glucose -899-138  August 19 - recent blood glucose [phone] - increase glipizide to 7.5 mg bid  August 21 - refused blood glucose check today. Will decrease glipizide back to 5 mg bid to avoid potential for hypoglycemia until allow blood glucose check.     Stroke prophylaxis-aspirin/Eliquis/atorvastatin    Anemia-August 1-hemoglobin 7.4.  Most recent check on July 23 HGB 9.3.  Will recheck hemoglobin this afternoon.  Hemoccult stool.  Iron studies.  Reticulocyte count.  Recheck CBC in the a.m.  Added Protonix.  Patient is on aspirin and Eliquis.  With recent stroke  will look to transfuse packed red blood cell.  August 2-hemoglobin improved 9.0-1 unit packed red blood cells.  Elevated reticulocyte count in response to anemia.  Nursing describes dark stool.  Patient discussed with gastroenterology.  At this point unable to do endoscopy as on Eliquis and aspirin.  Will treat symptomatically for now with increasing proton pump inhibitor and transfusing as needed.  August 5-anemia improved-hemoglobin 9.8  August 16-hemoglobin unchanged 9.8    DVT prophylaxis-SCDs/anticoagulation    Impulsivity-   Nursing to do re-orientation with the patient.  Room close to the nursing station.    Endocrine-vitamin D deficiency-ergocalciferol 50,000 units weekly x8 weeks added. Vitamin B12 level and TSH checked in late May unremarkable    Urinary tract infection-August 20 -urine culture with E. coli ESBL.  Given her aphasia not able to obtain information regarding dysuria.  As the urinalysis was ordered as part of work-up for increased restlessness this weekend, would have to treat as symptomatic although could be asymptomatic bacteriuria.  Will place on ertapenem 1 g IV daily for 3-5  days.     Impaired cognition/impaired language, impaired swallow, impaired activity daily living, impaired mobility     Now admit for comprehensive acute inpatient rehabilitation .  This would be an interdisciplinary program with physical therapy 1 hour,  occupational therapy 1 hour, and speech therapy 1 hour, 5 days a week.  Rehabilitation nursing for carryover, monitoring of cardiopulmonary and neurologic   status, bowel and bladder, and skin  Ongoing physician follow-up.  Weekly team conferences.  Goals are indeterminant.   Rehabilitation prognosis determined.  Medical prognosis determined.  Estimated length of stay is indeterminate.    TEAM CONF - AUGUST 6- TRANFERS CTG. GAIT UP  FEET CTG-MIN ASSIST. UBD MIN. LBD MOD. BATH MOD. SEVERE APHASIA. INCREASED RESISTANCE TO PARTICIPATE AT TIMES.   PUREE/HTL.  GOOD PO INTAKE. ADJUSTING MEDS FOR DIABETES MELLITUS.  BLADDER CONTINENT/INCONTIENT.   ELOS- 2 WEEKS.     TEAM JEFFREY - AUGUST 13 - DID GREAT WITH PT ON Saturday, FOLLOWING COMMANDS AND BATTING A BALL WITH ANOTHER PATIENT. YESTERDAY, VERY FRUSTRATED AND IRRITABLE.  FRUSTRATED BY APHASIA, TRYING TO TELL STAFF SOMETHING. WILL HOLD ON TO OBJECTS, REPEAT SINGLE WORD.   BED CTG. 4 STAIRS CTG.  GAIT 220 FEET CTG-SBA NO DEVICE.  TOILET TRANSFERS CTG-MIN.  GROOMING MIN.  UBD MIN ASSIST FOR BRA, LBD CTG-MIN. BATH CTG-MIN. COORDINATION RUE BETTER.  DYSPHAGIA - IMPROVED - LAI EDGAR NTHILLARY. DO NOT FEEL SHE WOULD TOLERATE E-STIM. RECEPTIVE LANGUAGE IMPROVED SLIGHTLY , POINTING TO OBJECTS. EXPRESSIVELY PERSEVERATIVE ON A SINGLE WORD, TRIES TO USE PICTURE BOARD TO COMMUNICATE. YES/NO NOT ACCURATE.  CONTINENT BOWEL AND BLADDER, TIMED VOIDS. SKIN INTACT. TYPICALLY GOOD INTAKE.  ELOS - 1.5 WEEKS    AUGUST 14 - In therapies - In OT, increased participation noted with  present   Transfers SBA/CTG  Walked from therapy gym to room without AD SBA and vc's for directions back to the room   300' outdoors on sidewalk, incline;  300', 180', 100' up on rehab unit. Pt was able to find her room when she got close to it  Bathing, dressing, grooming min assist. Toileting moderate assist.  Pt participated in using R hand to manipualte round pegs into peg board by color with MIN difficutly once pt caught on to the task. with 3D block recreation with R hand with 100% color match, 80% accuracy with block recreation  With SLP, patient was not able to effectively communicate her wants/needs but refused to go to therapy office by planting her heels while rolling in wc down the [place]; tx session completed in her room     August 15-The patient was reviewed with .  Discussed adding on an antidepressant with anxiolytic properties -Paxil-as it appears frustration from her aphasia with associated anxiety with the frustration affects her performance.  We will plan on starting Paxil 10 mg daily tomorrow and monitor response.  Reviewed with the patient's  that would not add a short acting anxiolytic (such as a benzodiazepine or Seroquel) as it may affect her cognitive performance.    August 16-Patient was reviewed with her daughter today including rehabilitation goals, discharge planning, and recent medication adjustment to try to help with her anxiety/frustration with her aphasia.  Reviewed with the daughter that on discussion with the team is felt that she would do better with her functional status/aphasia/anxiety if able to be discharged to home environment with more frequent interactions but if that is not feasible with the need to look at subacute options.    TEAM CONF - AUGUST 20 - GAIT CTG- FEET. NO DEVICE, WALKS TO AND FROM GYM.  ADLS CTG WITH CUING.  PLAYED ENTIRE GAME OF Oxynade YESTERDAY. VARIABLE PERFORMANCE WITH SPEECH THERAPY 20-90% MATCHING WORDS TO PICTURES.    MECH SOFT, GROUND MEAT, NECTAR THICK LIQUIDS.   TYPICALLY CONTINENT BLADDER BUT INCONTINENT BOWEL. DID FINE LAST EVENING PER SISTER AND DID NOT TRY TO GET UP- WILL  DISCONTINUE SITTER. IN ROOM CLOSE TO NURSING STATION.   ELOS- FAMILY CONF LAST WEEK- WILL WORK TOWARD SUBACUTE PLACEMENT     August 23-upper body dressing set up, lower body dressing min assist.  Grooming supervision.  Transfers standby assist.  Ambulated 220 feet without a device contact-guard standby assist.  She spent in therapy this morning but not with physical therapy this afternoon     Barrier to discharge includes  Impaired cognitive-linguistic skills - work on receptive and expressive language and cognition.       Oj Avila MD  08/25/19  4:08 PM    Time:

## 2019-08-25 NOTE — PROGRESS NOTES
Inpatient Rehabilitation Plan of Care Note    Plan of Care  Care Plan Reviewed - No updates at this time.    Psychosocial    Performed Intervention(s)  Assist patient to express needs and concerns  calm enviroment      Body Systems    Performed Intervention(s)  Daily skin inspection    Signed by: Leslie Mcdonald RN

## 2019-08-25 NOTE — PLAN OF CARE
"Problem: Skin Injury Risk (Adult)  Goal: Skin Health and Integrity  Outcome: Ongoing (interventions implemented as appropriate)      Problem: Fall Risk (Adult)  Goal: Absence of Fall  Outcome: Ongoing (interventions implemented as appropriate)      Problem: Patient Care Overview  Goal: Plan of Care Review  Outcome: Ongoing (interventions implemented as appropriate)   08/24/19 1027 08/24/19 2250 08/25/19 0525   Patient Care Overview   IRF Plan of Care Review progress ongoing, continue --  --    Progress, Functional Goals --  --  progress more gradual than expected   Coping/Psychosocial   Plan of Care Reviewed With --  patient --    OTHER   Outcome Summary --  --  Pt confused, refused HS meds. Refused to get up to br. I/c done. Agitated, combative at times. Kept repeating \"no\" at HS when trying to explain care to pt.      Goal: Individualization and Mutuality  Outcome: Ongoing (interventions implemented as appropriate)    Goal: Discharge Needs Assessment  Outcome: Ongoing (interventions implemented as appropriate)    Goal: Coping Plan  Outcome: Ongoing (interventions implemented as appropriate)      Problem: Stroke (IRF) (Adult)  Goal: Promote Optimal Functional [place]  Outcome: Ongoing (interventions implemented as appropriate)        "

## 2019-08-26 LAB
ANION GAP SERPL CALCULATED.3IONS-SCNC: 10 MMOL/L (ref 5–15)
BASOPHILS # BLD AUTO: 0.04 10*3/MM3 (ref 0–0.2)
BASOPHILS NFR BLD AUTO: 0.7 % (ref 0–1.5)
BUN BLD-MCNC: 20 MG/DL (ref 8–23)
BUN/CREAT SERPL: 27.8 (ref 7–25)
CALCIUM SPEC-SCNC: 8.5 MG/DL (ref 8.6–10.5)
CHLORIDE SERPL-SCNC: 104 MMOL/L (ref 98–107)
CO2 SERPL-SCNC: 27 MMOL/L (ref 22–29)
CREAT BLD-MCNC: 0.72 MG/DL (ref 0.57–1)
DEPRECATED RDW RBC AUTO: 50.8 FL (ref 37–54)
EOSINOPHIL # BLD AUTO: 0.1 10*3/MM3 (ref 0–0.4)
EOSINOPHIL NFR BLD AUTO: 1.8 % (ref 0.3–6.2)
ERYTHROCYTE [DISTWIDTH] IN BLOOD BY AUTOMATED COUNT: 14.9 % (ref 12.3–15.4)
GFR SERPL CREATININE-BSD FRML MDRD: 79 ML/MIN/1.73
GLUCOSE BLD-MCNC: 142 MG/DL (ref 65–99)
GLUCOSE BLDC GLUCOMTR-MCNC: 131 MG/DL (ref 70–130)
GLUCOSE BLDC GLUCOMTR-MCNC: 147 MG/DL (ref 70–130)
HCT VFR BLD AUTO: 31.1 % (ref 34–46.6)
HGB BLD-MCNC: 9.7 G/DL (ref 12–15.9)
IMM GRANULOCYTES # BLD AUTO: 0.03 10*3/MM3 (ref 0–0.05)
IMM GRANULOCYTES NFR BLD AUTO: 0.5 % (ref 0–0.5)
LYMPHOCYTES # BLD AUTO: 1.11 10*3/MM3 (ref 0.7–3.1)
LYMPHOCYTES NFR BLD AUTO: 19.6 % (ref 19.6–45.3)
MCH RBC QN AUTO: 29 PG (ref 26.6–33)
MCHC RBC AUTO-ENTMCNC: 31.2 G/DL (ref 31.5–35.7)
MCV RBC AUTO: 92.8 FL (ref 79–97)
MONOCYTES # BLD AUTO: 0.48 10*3/MM3 (ref 0.1–0.9)
MONOCYTES NFR BLD AUTO: 8.5 % (ref 5–12)
NEUTROPHILS # BLD AUTO: 3.91 10*3/MM3 (ref 1.7–7)
NEUTROPHILS NFR BLD AUTO: 68.9 % (ref 42.7–76)
NRBC BLD AUTO-RTO: 0 /100 WBC (ref 0–0.2)
PLATELET # BLD AUTO: 270 10*3/MM3 (ref 140–450)
PMV BLD AUTO: 9.9 FL (ref 6–12)
POTASSIUM BLD-SCNC: 3.6 MMOL/L (ref 3.5–5.2)
RBC # BLD AUTO: 3.35 10*6/MM3 (ref 3.77–5.28)
SODIUM BLD-SCNC: 141 MMOL/L (ref 136–145)
WBC NRBC COR # BLD: 5.67 10*3/MM3 (ref 3.4–10.8)

## 2019-08-26 PROCEDURE — 92507 TX SP LANG VOICE COMM INDIV: CPT

## 2019-08-26 PROCEDURE — 80048 BASIC METABOLIC PNL TOTAL CA: CPT | Performed by: PHYSICAL MEDICINE & REHABILITATION

## 2019-08-26 PROCEDURE — 82962 GLUCOSE BLOOD TEST: CPT

## 2019-08-26 PROCEDURE — 97110 THERAPEUTIC EXERCISES: CPT

## 2019-08-26 PROCEDURE — 97535 SELF CARE MNGMENT TRAINING: CPT

## 2019-08-26 PROCEDURE — 85025 COMPLETE CBC W/AUTO DIFF WBC: CPT | Performed by: PHYSICAL MEDICINE & REHABILITATION

## 2019-08-26 RX ADMIN — POTASSIUM CHLORIDE 20 MEQ: 1.5 POWDER, FOR SOLUTION ORAL at 07:40

## 2019-08-26 RX ADMIN — NYSTATIN 500000 UNITS: 100000 SUSPENSION ORAL at 07:40

## 2019-08-26 RX ADMIN — ASPIRIN 325 MG: 325 TABLET ORAL at 07:41

## 2019-08-26 RX ADMIN — NEBIVOLOL HYDROCHLORIDE 20 MG: 10 TABLET ORAL at 07:41

## 2019-08-26 RX ADMIN — GLIPIZIDE 5 MG: 5 TABLET ORAL at 07:55

## 2019-08-26 RX ADMIN — HYDRALAZINE HYDROCHLORIDE 50 MG: 50 TABLET, FILM COATED ORAL at 23:51

## 2019-08-26 RX ADMIN — ATORVASTATIN CALCIUM 80 MG: 80 TABLET, FILM COATED ORAL at 23:51

## 2019-08-26 RX ADMIN — PAROXETINE HYDROCHLORIDE 10 MG: 10 TABLET, FILM COATED ORAL at 07:40

## 2019-08-26 RX ADMIN — LOSARTAN POTASSIUM: 50 TABLET, FILM COATED ORAL at 07:41

## 2019-08-26 RX ADMIN — HYDRALAZINE HYDROCHLORIDE 50 MG: 50 TABLET, FILM COATED ORAL at 05:44

## 2019-08-26 RX ADMIN — NYSTATIN 500000 UNITS: 100000 SUSPENSION ORAL at 11:17

## 2019-08-26 RX ADMIN — METFORMIN HYDROCHLORIDE 1000 MG: 1000 TABLET ORAL at 07:40

## 2019-08-26 RX ADMIN — DORZOLAMIDE HYDROCHLORIDE 1 DROP: 20 SOLUTION/ DROPS OPHTHALMIC at 07:40

## 2019-08-26 RX ADMIN — BRIMONIDINE TARTRATE 1 DROP: 2 SOLUTION OPHTHALMIC at 07:40

## 2019-08-26 RX ADMIN — APIXABAN 5 MG: 5 TABLET, FILM COATED ORAL at 23:51

## 2019-08-26 RX ADMIN — NEBIVOLOL HYDROCHLORIDE 20 MG: 10 TABLET ORAL at 23:51

## 2019-08-26 RX ADMIN — LANSOPRAZOLE 30 MG: KIT at 05:44

## 2019-08-26 RX ADMIN — APIXABAN 5 MG: 5 TABLET, FILM COATED ORAL at 07:40

## 2019-08-26 RX ADMIN — OLANZAPINE 5 MG: 10 INJECTION, POWDER, FOR SOLUTION INTRAMUSCULAR at 20:14

## 2019-08-26 RX ADMIN — ACETAMINOPHEN 1000 MG: 500 TABLET, FILM COATED ORAL at 19:41

## 2019-08-26 RX ADMIN — AMLODIPINE BESYLATE 5 MG: 5 TABLET ORAL at 07:41

## 2019-08-26 RX ADMIN — AMLODIPINE BESYLATE 5 MG: 5 TABLET ORAL at 23:50

## 2019-08-26 NOTE — PROGRESS NOTES
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  Gets frustrated.  Perseverates on \"no\".  Participating in therapies this morning but not this afternoon.  Does not identify any focal complaint  Patient reviewed with nursing earlier today and discontinued sitter.  Reviewed patient with her family this afternoon.      History taken from: patient chart RN    Objective     Vital Signs  Temp:  [98 °F (36.7 °C)-98.7 °F (37.1 °C)] 98 °F (36.7 °C)  Heart Rate:  [60-76] 72  Resp:  [16-18] 18  BP: (100-149)/(55-70) 119/70    Objective  Physical Exam  MENTAL STATUS -  AWAKE / ALERT.  Anxious   HEENT-    LUNGS - normal respirations.    HEART-    ABD -   Non-distended  EXT - no edema  NEURO -alert.  Aphasia.      MOTOR EXAM -takes resistance in the bilateral upper extremities and lower extremities         Results Review:     I reviewed the patient's new clinical results.     CT HEAD - AUGUST 11, 2019  FINDINGS:  Old appearing lacunar type infarcts are again noted about the  right thalamus and basal ganglia regions. There are stable areas of  encephalomalacia in the left parietal and occipital lobes medially.  Generalized atrophy. There are moderately extensive scattered areas of  decreased density in the white matter likely related to chronic ischemic  gliotic changes.    No hydrocephalus.    Glucose   Date/Time Value Ref Range Status "   08/26/2019 1104 131 (H) 70 - 130 mg/dL Final   08/26/2019 0658 147 (H) 70 - 130 mg/dL Final   08/25/2019 2005 91 70 - 130 mg/dL Final   08/25/2019 1553 120 70 - 130 mg/dL Final   08/25/2019 1146 118 70 - 130 mg/dL Final   08/25/2019 0716 110 70 - 130 mg/dL Final   08/24/2019 2200 124 70 - 130 mg/dL Final   08/24/2019 1638 103 70 - 130 mg/dL Final     Results from last 7 days   Lab Units 08/26/19  0658 08/23/19  0631 08/21/19  0605   WBC 10*3/mm3 5.67 4.21 4.86   HEMOGLOBIN g/dL 9.7* 9.5* 9.8*   HEMATOCRIT % 31.1* 30.6* 30.6*   PLATELETS 10*3/mm3 270 306 324       Ref. Range 5/22/2019 05:44 5/22/2019 11:34 7/9/2019 08:25 7/18/2019 04:49 7/19/2019 05:05 7/23/2019 05:56 8/1/2019 06:48   Hemoglobin Latest Ref Range: 12.0 - 15.9 g/dL 10.8 (L)  10.6 (L) 8.5 (L) 9.7 (L) 9.3 (L) 7.4 (L)   Hematocrit Latest Ref Range: 34.0 - 46.6 % 35.1  33.4 (L) 26.2 (L) 29.9 (L) 28.6 (L) 23.6 (L)     Results from last 7 days   Lab Units 08/26/19  0658 08/23/19  0631 08/21/19  0605   SODIUM mmol/L 141 137 138   POTASSIUM mmol/L 3.6 3.6 3.5   CHLORIDE mmol/L 104 101 99   CO2 mmol/L 27.0 28.4 28.1   BUN mg/dL 20 17 19   CREATININE mg/dL 0.72 0.73 0.65   CALCIUM mg/dL 8.5* 9.1 8.8   GLUCOSE mg/dL 142* 108* 117*         Ref. Range 8/1/2019 06:47   25 Hydroxy, Vitamin D Latest Ref Range: 30.0 - 100.0 ng/ml 21.8 (L)       Ref. Range 8/1/2019 06:47   Total Cholesterol Latest Ref Range: 0 - 200 mg/dL 105   HDL Cholesterol Latest Ref Range: 40 - 60 mg/dL 40   LDL Cholesterol  Latest Ref Range: 0 - 100 mg/dL 57   VLDL Cholesterol Latest Ref Range: 5 - 40 mg/dL 8   Triglycerides Latest Ref Range: 0 - 150 mg/dL 40   Medication Review: done  Scheduled Meds:    amLODIPine 5 mg Oral BID - RT   apixaban 5 mg Oral Q12H   aspirin 325 mg Oral Daily   atorvastatin 80 mg Oral Nightly   brimonidine 1 drop Both Eyes BID   dorzolamide 1 drop Both Eyes BID   glipiZIDE 5 mg Oral BID AC   hydrALAZINE 50 mg Oral Q8H   insulin lispro 0-7 Units Subcutaneous 4x Daily  With Meals & Nightly   lansoprazole 30 mg Oral BID AC   losartan-HCTZ (HYZAAR) 100-12.5 combo dose  Oral Daily   metFORMIN 1,000 mg Oral BID With Meals   nebivolol 20 mg Oral BID   nystatin 5 mL Swish & Spit 4x Daily   PARoxetine 10 mg Oral Daily   potassium chloride 20 mEq Oral Daily With Breakfast   vitamin D 50,000 Units Oral Q7 Days     Continuous Infusions:   PRN Meds:.•  acetaminophen  •  aluminum-magnesium hydroxide-simethicone  •  dextrose  •  dextrose  •  glucagon (human recombinant)  •  nitroglycerin  •  OLANZapine      Assessment/Plan       Stroke (cerebrum) (CMS/[facility])      Assessment & Plan  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  If add Namenda, will probably discontinue amantadine as she continues alert.  August 11-she has some increased irritability at times.  This may be related to the underlying stroke deficits itself.  She does not appear to have any dysuria, no odor to her urine.  Staff reports that for the most part she is eating okay.  Will check a follow-up CT of the head to assess for any interval visual changes.  She is on aspirin and Eliquis for stroke prophylaxis.  She had noticeably improved alertness when amantadine was started.  She is readily alert now.  This may be related to natural recovery in terms of her level of arousal at this point.  Current plan is to discontinue the amantadine to see if that helps with her irritability and start  on Namenda for aphasia.  August 12 -  Patient with increased restlessness at times.   Continues aphasia. Not able to identify any complaint.  Appears anxious today. No change on CT head yesterday. Started on Namenda for aphasia on 8/12/19.   August 13- will continue to monitor on recent med adjustments   August 15-She continues with expressive language deficits.     Again appears anxious related to her aphasia and difficulty expressing herself.    She will be able to get occasional single word for the examiner.  She does follow instructions.  The patient was reviewed with .  Discussed adding on an antidepressant with anxiolytic properties -Paxil-as it appears frustration from her aphasia with associated anxiety with the frustration affects her performance.  We will plan on starting Paxil 10 mg daily tomorrow and monitor response.  Reviewed with the patient's  that would not add a short acting anxiolytic (such as a benzodiazepine or Seroquel) as it may affect her cognitive performance.    August 16-started Paxil 10 mg daily  August 19 - Increased restlessness Friday night - ? Related to UTI vs initiation of Paxil. On Cefdinir for GNR pending ID &Sensitivity.  August 20 -urine culture with E. coli ESBL.  Given her aphasia not able to obtain information regarding dysuria.  As the urinalysis was ordered as part of work-up for increased restlessness this weekend, would have to treat as symptomatic although could be asymptomatic bacteriuria.  Will place on ertapenem 1 g IV daily for 3-5 days.  August 21 - increased restlessness/anxiety today. Labs without any acute change. Blood glucose okay this AM. BP pattern okay. No gross motor changes. Follow on current regimen for now.   Addendum-Patient continued today with  increased restlessness/anxiety/refusing aspects of nursing care. She had had a good day in therapies yesterday. She is calmer now with family present. Did allow IV antibiotic to be infused but has not taken PO meds or eaten dinner yet.   Discussed with patient's /children will need to hold on planned discharge tomorrow given her mood/behavior today.   I do not feel Namenda (started as trial for aphasia) is related as had episodes of restlessness prior to starting, but will discontinue for now so  slate with psychoactive medications. Continue  "Paxil for now. Will consult Psychiatry for input.  August 22-patient more cooperative today, taking medications and will participate in therapies.  Still perseverates on \"no\".  Still with some anxiety related to her aphasia but less than yesterday.  Seen by psychiatry and to continue with Paxil 10 mg daily.  Also order written for Zyprexa 5 mg IM every 8 hours as needed agitation.  To observe on current regimen.  August 26 - still gets anxious/frustrated by aphasia. Not participate with therapies or eat meal at times. Continues on Paxil. Will ask Psychiatry for any additional thoughts in AM.     Aphasia -  August 12 - trial of Namenda  August 16-continue to observe on Namenda 5 mg daily for her aphasia and may possibly titrate up next week  August 20-titrate up on Namenda to 5 mg twice a day  August 22-discontinued Namenda.  Do not feel related to her anxiety/restlessness as it was present prior to starting but discontinued to avoid any additional psychoactive medications that might add to it.  Had not seen any improvement in her aphasia with the very brief trial.    Dysphagia-Cortrak feeding tube discontinued on July 31 and started on puréed/honey thick liquid diet with strategies  August 7-advance to fork mash nectar thick liquid diet, consistent carbohydrate.  August 14 - repeat VFSS to assess for advancing to thin liquids  August 15-Video fluoroscopic swallow study today-mechanical soft, no mixed consistency, nectar thick liquid, water protocol between meals, no straws.    History of multiple bilateral strokes and left central retinal artery occlusion    History of left greater than right internal carotid artery stenosis-status post left internal carotid artery stent July 17, 2019    History of hypertension -  Hyzaar 100-25. August 4 - Bystolic increased to 20 mg daily to 20 mg bid.  August 8 - BP still runs high. On discharge in May 2019 was on Hyzaar 100-25 mg daily, Bystolic 20 mg daily, amlodipine 10 mg " daily, Hydralazine 50 mg q 8 hours.  Will add Hydralazine initially 10 mg po q 8 hours and titrate up as needed.   August 9-systolic blood pressure elevated last night and early this morning but better this afternoon at 122-130.  Continue to follow recent addition of hydralazine and titrate up as needed  August 10-systolic blood pressure 175 this afternoon, range otherwise 122-142.  Will titrate up on hydralazine to 20 mg every 8 hours.  August 11-hypertension overall appears improved.  Will titrate up further to 25 mg every 8 hours.  August 12 - add amlodipine 5 mg daily.   August 14 - titrate up on hydralazine to 50 mg q 8 hours  August 15-blood pressure improved this afternoon with systolic blood pressure in the 120s-follow on current regimen  August 16-blood pressure range 110////59-will continue to monitor on present regimen  August 19 - /74 early AM, later 136/71 - increase amlodipine to 5 mg bid    History of diabetes mellitus -Lantus/glipizide (home medication metformin 1000 mg twice daily and glipizide 10 mg twice daily)  August 1-blood sugars were low yesterday afternoon after tube feeds discontinued.  Held glipizide last night and this morning and Lantus last night.  Blood sugar elevated at noontime today.  Will receive resume glipizide at a lower dose of 5 mg twice a day with meals.  Continue sliding scale insulin coverage.  She also is on metformin at home.  August 5-add back metformin initially at 500 mg twice a day and continue glipizide 5 mg twice a day, both one half of previous home dose, titrate up as needed.  August 7-titrate up metformin to 850 mg twice a day.  Add consistent carbohydrate restriction to diet.  August 9-increase metformin to 1000 g twice a day.  August 10-blood glucose 340 last evening but 150-114-178 so far today  August 11-continue to follow blood sugar pattern and may increase glipizide further as current dose glipizide 5 mg twice a day along with  metformin 1000 mg twice a day  August 14 - blood glucose  past 24 hours - monitor pattern.  August 15-blood glucose [phone]--[phone]-continue to follow pattern.  Will change sliding scale insulin coverage to Humalog at a lower dose.  She was started on the regular insulin sliding scale when she was on tube feeds.  August 16 - recent blood glucose -903-138  August 19 - recent blood glucose [phone] - increase glipizide to 7.5 mg bid  August 21 - refused blood glucose check today. Will decrease glipizide back to 5 mg bid to avoid potential for hypoglycemia until allow blood glucose check.     Stroke prophylaxis-aspirin/Eliquis/atorvastatin    Anemia-August 1-hemoglobin 7.4.  Most recent check on July 23 HGB 9.3.  Will recheck hemoglobin this afternoon.  Hemoccult stool.  Iron studies.  Reticulocyte count.  Recheck CBC in the a.m.  Added Protonix.  Patient is on aspirin and Eliquis.  With recent stroke  will look to transfuse packed red blood cell.  August 2-hemoglobin improved 9.0-1 unit packed red blood cells.  Elevated reticulocyte count in response to anemia.  Nursing describes dark stool.  Patient discussed with gastroenterology.  At this point unable to do endoscopy as on Eliquis and aspirin.  Will treat symptomatically for now with increasing proton pump inhibitor and transfusing as needed.  August 5-anemia improved-hemoglobin 9.8  August 16-hemoglobin unchanged 9.8    DVT prophylaxis-SCDs/anticoagulation    Impulsivity-   Nursing to do re-orientation with the patient.  Room close to the nursing station.    Endocrine-vitamin D deficiency-ergocalciferol 50,000 units weekly x8 weeks added. Vitamin B12 level and TSH checked in late May unremarkable    Urinary tract infection-August 20 -urine culture with E. coli ESBL.  Given her aphasia not able to obtain information regarding dysuria.  As the urinalysis was ordered as part of work-up for increased restlessness this weekend, would have  to treat as symptomatic although could be asymptomatic bacteriuria.  Will place on ertapenem 1 g IV daily for 3-5 days.     Impaired cognition/impaired language, impaired swallow, impaired activity daily living, impaired mobility     Now admit for comprehensive acute inpatient rehabilitation .  This would be an interdisciplinary program with physical therapy 1 hour,  occupational therapy 1 hour, and speech therapy 1 hour, 5 days a week.  Rehabilitation nursing for carryover, monitoring of cardiopulmonary and neurologic   status, bowel and bladder, and skin  Ongoing physician follow-up.  Weekly team conferences.  Goals are indeterminant.   Rehabilitation prognosis determined.  Medical prognosis determined.  Estimated length of stay is indeterminate.    TEAM CONF - AUGUST 6- TRANFERS CTG. GAIT UP  FEET CTG-MIN ASSIST. UBD MIN. LBD MOD. BATH MOD. SEVERE APHASIA. INCREASED RESISTANCE TO PARTICIPATE AT TIMES.   PUREE/HTL.  GOOD PO INTAKE. ADJUSTING MEDS FOR DIABETES MELLITUS.  BLADDER CONTINENT/INCONTIENT.   ELOS- 2 WEEKS.     TEAM CONF - AUGUST 13 - DID GREAT WITH PT ON Saturday, FOLLOWING COMMANDS AND BATTING A BALL WITH ANOTHER PATIENT. YESTERDAY, VERY FRUSTRATED AND IRRITABLE.  FRUSTRATED BY APHASIA, TRYING TO TELL STAFF SOMETHING. WILL HOLD ON TO OBJECTS, REPEAT SINGLE WORD.   BED CTG. 4 STAIRS CTG.  GAIT 220 FEET CTG-SBA NO DEVICE.  TOILET TRANSFERS CTG-MIN.  GROOMING MIN.  UBD MIN ASSIST FOR BRA, LBD CTG-MIN. BATH CTG-MIN. COORDINATION RUE BETTER.  DYSPHAGIA - IMPROVED - FORK TALA, NTL. DO NOT FEEL SHE WOULD TOLERATE E-STIM. RECEPTIVE LANGUAGE IMPROVED SLIGHTLY , POINTING TO OBJECTS. EXPRESSIVELY PERSEVERATIVE ON A SINGLE WORD, TRIES TO USE PICTURE BOARD TO COMMUNICATE. YES/NO NOT ACCURATE.  CONTINENT BOWEL AND BLADDER, TIMED VOIDS. SKIN INTACT. TYPICALLY GOOD INTAKE.  ELOS - 1.5 WEEKS    AUGUST 14 - In therapies - In OT, increased participation noted with  present   Transfers SBA/CTG  Walked from  therapy gym to room without AD SBA and vc's for directions back to the room   300' outdoors on sidewalk, incline; 300', 180', 100' up on rehab unit. Pt was able to find her room when she got close to it  Bathing, dressing, grooming min assist. Toileting moderate assist.  Pt participated in using R hand to manipualte round pegs into peg board by color with MIN difficutly once pt caught on to the task. with 3D block recreation with R hand with 100% color match, 80% accuracy with block recreation  With SLP, patient was not able to effectively communicate her wants/needs but refused to go to therapy office by planting her heels while rolling in wc down the [place]; tx session completed in her room     August 15-The patient was reviewed with .  Discussed adding on an antidepressant with anxiolytic properties -Paxil-as it appears frustration from her aphasia with associated anxiety with the frustration affects her performance.  We will plan on starting Paxil 10 mg daily tomorrow and monitor response.  Reviewed with the patient's  that would not add a short acting anxiolytic (such as a benzodiazepine or Seroquel) as it may affect her cognitive performance.    August 16-Patient was reviewed with her daughter today including rehabilitation goals, discharge planning, and recent medication adjustment to try to help with her anxiety/frustration with her aphasia.  Reviewed with the daughter that on discussion with the team is felt that she would do better with her functional status/aphasia/anxiety if able to be discharged to home environment with more frequent interactions but if that is not feasible with the need to look at subacute options.    TEAM CONF - AUGUST 20 - GAIT CTG- FEET. NO DEVICE, WALKS TO AND FROM GYM.  ADLS CTG WITH CUING.  PLAYED ENTIRE GAME OF CHECKERS YESTERDAY. VARIABLE PERFORMANCE WITH SPEECH THERAPY 20-90% MATCHING WORDS TO PICTURES.    MECH SOFT, GROUND MEAT, NECTAR THICK LIQUIDS.    TYPICALLY CONTINENT BLADDER BUT INCONTINENT BOWEL. DID FINE LAST EVENING PER SISTER AND DID NOT TRY TO GET UP- WILL DISCONTINUE SITTER. IN ROOM CLOSE TO NURSING STATION.   [facility]- FAMILY CONF LAST WEEK- WILL WORK TOWARD SUBACUTE PLACEMENT     August 23-upper body dressing set up, lower body dressing min assist.  Grooming supervision.  Transfers standby assist.  Ambulated 220 feet without a device contact-guard standby assist.  She spent in therapy this morning but not with physical therapy this afternoon     Barrier to discharge includes  Impaired cognitive-linguistic skills - work on receptive and expressive language and cognition.       Ajit Howard MD  08/26/19  5:10 PM    Time:

## 2019-08-26 NOTE — PROGRESS NOTES
Inpatient Rehabilitation Functional Measures Assessment    Functional Measures  KATI Eating:  [facility] Grooming: [facility] Bathing:  [facility] Upper Body Dressing:  [facility] Lower Body Dressing:  [facility] Toileting:  [facility] Bladder Management  Level of Assistance:  [place]  Frequency/Number of Accidents this Shift:  [facility] Bowel Management  Level of Assistance: [place]  Frequency/Number of Accidents this Shift: [facility] Bed/Chair/Wheelchair Transfer:  [facility] Toilet Transfer:  [facility] Tub/Shower Transfer:  [place]    Previously Documented Mode of Locomotion at Discharge: Field  KATI Expected Mode of Locomotion at Discharge: [facility] Walk/Wheelchair:  [facility] Stairs:  [facility] Comprehension:  Both ( auditory and visual) modes of comprehension are used  equally. Patient does not comprehend complex/abstract information in their  primary language without assistance from a helper. Comprehension Score = 2,  Maximal Prompting. Patient comprehends basic daily needs 25-49% of the time.  Patient understands simple information via single words or gestures. Requires  maximal/a lot of prompting (most of the time). No assistive devices were  required.  KATI Expression:  Both ( vocal and non-vocal) modes of expression are used  equally. Patient does not express complex/abstract information in their primary  language without a helper. Expression Score = 1, Total Assistance. Patient  expresses basic daily needs less than 25% of the time, or does not express basic  needs appropriately or consistently despite prompting. Patient requires the  following assistive device(s): Communication board/device.  [facility] Social Interaction:  Activity was not observed.  [facility] Problem Solving:  Activity was not observed.  [facility] Memory:  Activity was not observed.    Therapy Mode Minutes  Occupational Therapy: [place]  Physical Therapy: [place]  Speech Language Pathology:  [place]    Signed by: Trupti Viramontes RN

## 2019-08-26 NOTE — PROGRESS NOTES
Inpatient Rehabilitation Functional Measures Assessment and Plan of Care    Plan of Care  Updated Problems/Interventions  Mobility    [PT] Bed/Chair/Wheelchair(Active)  Current Status(08/26/2019): SBA  Weekly Goal(08/30/2019): SBA  Discharge Goal: SBA    [PT] Walk(Active)  Current Status(08/26/2019): 300` SBA  Weekly Goal(08/30/2019): to and from BR, SBA  Discharge Goal: 300` SBA    [PT] Stairs(Active)  Current Status(08/26/2019): 4 steps CGA  Weekly Goal(08/30/2019): PT only  Discharge Goal: 4 steps CGA    Functional Measures  KATI Eating:  [place]  [facility] Grooming: [place]  [facility] Bathing:  [place]  [facility] Upper Body Dressing:  [place]  [facility] Lower Body Dressing:  [facility] Toileting:  [facility] Bladder Management  Level of Assistance:  [place]  Frequency/Number of Accidents this Shift:  [facility] Bowel Management  Level of Assistance: [place]  Frequency/Number of Accidents this Shift: [place]    [facility] Bed/Chair/Wheelchair Transfer:  Activity was not observed.  [facility]ATI Toilet Transfer:  [place]  [facility] Tub/Shower Transfer:  [place]    Previously Documented Mode of Locomotion at Discharge: Field  KATI Expected Mode of Locomotion at Discharge: [place]  [facility] Walk/Wheelchair:  WHEELCHAIR OBSERVATION   Activity was not observed.    WALK OBSERVATION   Walk Distance Scale = 3.  Distance walked is greater than 150 feet. Walk Score  = 5.  Patient requires supervision or set up for walking. Stand by assistance.  Patient walked a distance of  300 feet. No assistive devices were required.  KATI Stairs:  Activity was not observed.    [facility]ATI Comprehension:  [place]  [facility] Expression:  [place]  [facility] Social Interaction:  [facility] Problem Solving:  [facility] Memory:  [place]    Therapy Mode Minutes  Occupational Therapy: [place]  Physical Therapy: Individual: 30 minutes.  Speech Language Pathology:  [place]    Signed by: Mira Chadwick, PT

## 2019-08-26 NOTE — PROGRESS NOTES
Inpatient Rehabilitation Functional Measures Assessment and Plan of Care    Plan of Care  Updated Problems/Interventions  Communication    [ST] Comprehension(Active)  Current Status(08/26/2019): mod-severely impaired; improved ability to point to  named objects and body parts, still cannot follow verbal commands consistently  or answer yes/no questions reliably  Weekly Goal(08/26/2019): follow 1-step commands with a model  Discharge Goal: improved comprehension    [ST] Expression(Active)  Current Status(08/19/2019): Severely impaired. Stimulable for use of melodic  intonation to produce phrases on 8/26/19.  Weekly Goal(08/30/2019): Produce functional phrases for ADL tasks  Discharge Goal: functional expression for basic wants/needs        Swallow Function    [ST] Swallowing(Active)  Current Status(08/26/2019): VFSS 8/15: [facility]. soft, no mixed, ground meat, NTL;  medications whole as tolerated with puree/NTL; water/ice between meals after  oral care  Weekly Goal(08/26/2019): tolerate current diet without s/s pen/asp  Discharge Goal: tolerate least restrictive diet without s/s pen/asp    Functional Measures  KATI Eating:  [facility] Grooming: [facility] Bathing:  [facility] Upper Body Dressing:  [facility] Lower Body Dressing:  [facility] Toileting:  [facility] Bladder Management  Level of Assistance:  [place]  Frequency/Number of Accidents this Shift:  [facility] Bowel Management  Level of Assistance: [place]  Frequency/Number of Accidents this Shift: [facility] Bed/Chair/Wheelchair Transfer:  [facility] Toilet Transfer:  [facility] Tub/Shower Transfer:  [place]    Previously Documented Mode of Locomotion at Discharge: Field  KATI Expected Mode of Locomotion at Discharge: [facility] Walk/Wheelchair:  [facility] Stairs:  [facility] Comprehension:  [facility] Expression:  [facility] Social Interaction:  [facility] Problem Solving:  [facility] Memory:  [place]    Therapy Mode Minutes  Occupational  Therapy: Branch  Physical Therapy: Branch  Speech Language Pathology:  Branch    Signed by: Lela Bueno, SLP

## 2019-08-26 NOTE — PROGRESS NOTES
Inpatient Rehabilitation Functional Measures Assessment    Functional Measures  KATI Eating:  [facility] Grooming: [facility] Bathing:  [facility] Upper Body Dressing:  [facility] Lower Body Dressing:  [facility] Toileting:  [facility] Bladder Management  Level of Assistance:  [place]  Frequency/Number of Accidents this Shift:  [facility] Bowel Management  Level of Assistance: [place]  Frequency/Number of Accidents this Shift: [facility] Bed/Chair/Wheelchair Transfer:  [facility] Toilet Transfer:  [facility] Tub/Shower Transfer:  [place]    Previously Documented Mode of Locomotion at Discharge: Field  KATI Expected Mode of Locomotion at Discharge: [facility] Walk/Wheelchair:  [facility] Stairs:  [facility] Comprehension:  Auditory comprehension is the usual mode. Patient does not  comprehend complex/abstract information in their primary language without  assistance from a helper. Comprehension Score = 3, Moderate Prompting. Patient  comprehends basic daily needs or ideas 50-74% of the time. Patient requires  moderate/some prompting. No assistive devices were required.  KATI Expression:  Vocal expression is the usual mode. Patient does not express  complex/abstract information in their primary language without a helper.  Expression Score = 2, Maximal Prompting. Patient expresses basic daily needs  25-49% of the time. Patient uses only single words or gestures and requires  maximal/a lot of prompting (most of the time). No assistive devices were  required.  KATI Social Interaction:  Social Interaction Score = 2, Maximal Direction.  Patient interacts appropriately 25-49% of the time. Patient requires maximal/a  lot of direction (most of the time) for the following behavior(s):  Non-cooperative.  KATI Problem Solving:  Activity was not observed. Unable to assess due to  severity of aphasia.  KATI Memory:  Activity was not observed. Unable to assess due to severity of  aphasia.    Therapy Mode  Minutes  Occupational Therapy: Branch  Physical Therapy: Branch  Speech Language Pathology:  Individual: 30 minutes.    Signed by: Lela Bueno, SLP

## 2019-08-26 NOTE — PLAN OF CARE
Problem: Skin Injury Risk (Adult)  Goal: Skin Health and Integrity  Outcome: Ongoing (interventions implemented as appropriate)      Problem: Fall Risk (Adult)  Goal: Absence of Fall  Outcome: Ongoing (interventions implemented as appropriate)      Problem: Stroke (IRF) (Adult)  Goal: Promote Optimal Functional [place]  Outcome: Ongoing (interventions implemented as appropriate)

## 2019-08-26 NOTE — PROGRESS NOTES
Inpatient Rehabilitation Functional Measures Assessment    Functional Measures  KATI Eating:  [facility] Grooming: [facility] Bathing:  [facility] Upper Body Dressing:  [facility] Lower Body Dressing:  [facility] Toileting:  [facility] Bladder Management  Level of Assistance:  [place]  Frequency/Number of Accidents this Shift:  [facility] Bowel Management  Level of Assistance: [place]  Frequency/Number of Accidents this Shift: [facility] Bed/Chair/Wheelchair Transfer:  [facility] Toilet Transfer:  [facility] Tub/Shower Transfer:  [place]    Previously Documented Mode of Locomotion at Discharge: Field  KATI Expected Mode of Locomotion at Discharge: [facility] Walk/Wheelchair:  [facility] Stairs:  [facility] Comprehension:  Auditory comprehension is the usual mode. Comprehension  Score = 6, Modified [place].  Patient comprehends complex/abstract  information in their primary language, requiring: Additional time.  KATI Expression:  Non-vocal expression is the usual mode. Patient does not  express complex/abstract information in their primary language without a helper.  Expression Score = 4, Minimal Prompting. Patient expresses basic daily needs or  ideas 75-90% of the time.  Patient requires minimal/occasional prompting. No  assistive devices were required.  KATI Social Interaction:  Social Interaction Score = 6, Modified Independent.  Patient is modified independent for social interaction, requiring: Requires  additional time. Requires a structured or modified environment. Medications.  KATI Problem Solving:  Activity was not observed.  KATI Memory:  Memory Score = 2, Maximal Prompting. Patient recognizes and  remembers 25-49% of the time. Patient requires maximal/a lot of prompting (most  of the time) for memory for the following: Disoriented to person, place, time or  situation. Inability to follow multi-step commands. Inability to recognize  people or remember instructions/directions requiring  short-term recall (minutes,  hours, days). Needs assistance for use of environmental cues. Unaware of daily  routine.    Therapy Mode Minutes  Occupational Therapy: Branch  Physical Therapy: Branch  Speech Language Pathology:  Branch    Signed by: Eda Allen RN

## 2019-08-26 NOTE — THERAPY TREATMENT NOTE
"Inpatient Rehabilitation - Speech Language Pathology Treatment Note    [facility]       Patient Name: Darby Workman  : 1945  MRN: [id number]    Today's Date: 2019           Admit Date: 2019      Visit Dx:      No diagnosis found.    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)   • Right-sided headache   • Acute ischemic stroke (CMS/HCC)   • Embolic stroke (CMS/HCC)   • Cerebral infarction due to stenosis of left carotid artery (CMS/HCC)   • Anticoagulated by anticoagulation treatment   • Stroke (cerebrum) (CMS/HCC)          Therapy Treatment    Evaluation/Coping    Evaluation/Treatment Time and Intent  Subjective Information: no complaints (19 0830 : Lela Bueno, SLP)  Existing Precautions/Restrictions: fall (19 0830 : Lela Bueno, SLP)  Document Type: therapy note (daily note) (19 0830 : Lela Bueno, SLP)  Mode of Treatment: speech-language pathology (19 1400 : Lela Bueno, SLP)  Patient/Family Observations: In PM, attempted therapy session at 12:30 and 2:30. In both instances, pt perseverated on stating \"no,\" closing eyes, holding family picture and waving therapist away. Spoke with nursing regarding possible status change. (19 1400 : Lela Bueno, SLP)  Start Time (Evaluation/Treatment): 0830 (19 0830 : Lela Bueno, SLP)  Stop Time (Evaluation/Treatment): 0900 (19 0830 : Lela Bueno, SLP)  Unable to Perform (Evaluation/Treatment): patient refused (19 1400 : Lela Bueno, SLP)    Vitals/Pain/Safety    Pain Scale: Numbers Pre/Post-Treatment  Pain Scale: Numbers, Pretreatment: 0/10 - no pain (19 0830 : Lela Bueno, SLP)    Cognition/Communication         Oral Motor/Eating         Mobility/Basic Activities/Instrumental Activities/Motor/Modality               " "    ROM/MMT                   Sensory/Myotome/Dermatome/Edema               Posture/Balance/Special Tests/Exercise/Transportation/Sexual Function                   Orthotics/Residual Limb/Prosthetic Management              Outcome Summary         EDUCATION    The patient has been educated in the following areas:     Communication Impairment.    SLP Recommendation and Plan                                                          SLP GOALS     Row Name 08/26/19 1100 08/24/19 1300          Words/Phrases/Sentences Goal 1 (SLP)    Improve Ability to Comprehend Words/Phrases/Sentences Through: Goal 1 (SLP)  --  identify objects, field of  -NR     Progress (Ability to Contruct Words/Phrases/Sentences Goal 1, SLP)  --  0%  -NR     Progress/Outcomes (Identify Objects and Pictures Goal 1, SLP)  --  continuing progress toward goal  -NR     Comment (Words/Phrases/Sentences Goal 1, SLP)  --  Total assist needed to Id objects in a field of two, pt agitated  -NR        Word Retrieval Skills Goal 1 (SLP)    Comment (Word Retrieval Goal 1, SLP)  Pt produced ~50% of \"Our Father\" in unison with clinician (pt brought [place] to therapy). She was able to sing \"Amazing Angie\" in unison with clinician with 75% accuracy with MAX cues; benefits from cues to look at SLP face while singing. She produced 3 functional phrases and her name with melodic intonation technique and MAX cues. In supported communication, she communicated her number of kids with MOD-MAX cues (benefited from written choices).  -AL  --        Graphic Expression of Shapes, Letters, Numbers Goal 1 (SLP)    Improve Graphic Expression of Shapes, Letters, and Numbers Goal 1 (SLP)  --  copy shapes, numbers, and letters  -NR     Progress/Outcomes (Graphic Expression of Shapes, Letters, and Numbers Goal 1, SLP)  --  continuing progress toward goal  -NR     Comment (Graphic Expression of Shapes, Letters, and Numbers Goal 1, SLP)  --  Pt able to copy single printed words c extra " time  and reduced legibility.  Pt able to select a crayon from a field of two intermittently and color portions of a pictured scene.  Pt was very agitated for both sessions.  -NR       User Key  (r) = Recorded By, (t) = Taken By, (c) = Cosigned By    Initials Name Provider Type    Mariposa Dalton MA, CCC-SLP Speech and Language Pathologist    Lela Holley SLP Speech and Language Pathologist             SLP Outcome Measures (last 72 hours)      SLP Outcome Measures     Row Name 08/24/19 1300             Adult FCM Scores    Verbal Expression FCM Score  2  -NR        User Key  (r) = Recorded By, (t) = Taken By, (c) = Cosigned By    Initials Name Effective Dates    Mariposa Dalton MA, CCC-SLP 06/08/18 -               Time Calculation:       Time Calculation- SLP     Row Name 08/26/19 1150             Time Calculation- SLP    SLP Start Time  0830  -AL      SLP Stop Time  0900  -AL      SLP Time Calculation (min)  30 min  -AL        User Key  (r) = Recorded By, (t) = Taken By, (c) = Cosigned By    Initials Name Provider Type    Lela Holley SLP Speech and Language Pathologist            Therapy Charges for Today     Code Description Service Date Service Provider Modifiers Qty    [id number]  ST TREATMENT SPEECH 2 8/26/2019 Lela Bueno SLP GN 1               ADULT NOMS (last 72 hours)      Adult NOMS     Row Name 08/24/19 1300                   Adult FCM Scores    Verbal Expression FCM Score  2  -NR          User Key  (r) = Recorded By, (t) = Taken By, (c) = Cosigned By    Initials Name Effective Dates    Mariposa Dalton MA, CCC-SLP 06/08/18 -                      WHIT Tavarez  8/26/2019

## 2019-08-26 NOTE — PROGRESS NOTES
Case Management  Inpatient Rehabilitation Plan of Care and Discharge Plan Note    Rehabilitation Diagnosis:  Branch  Date of Onset:  Branch    Medical Summary:  Branch  Past Medical History: Branch    Plan of Care  Updated Problems/Interventions  Field    Expected Intensity:  Branch  Interdisciplinary Team:  Seth  Estimated Length of Stay/Anticipated Discharge Date: Branch  Anticipated Discharge Destination:  Anticipated discharge destination from inpatient rehabilitation is community  discharge with assistance. A bed is available for pt at [facility] and she  may transfer to this facility when medically ready.      Based on the patient's medical and functional status, their prognosis and  expected level of functional improvement is:  Branch    Signed by: ESAU Packer

## 2019-08-26 NOTE — PROGRESS NOTES
Patient reviewed with nursing.  Overall doing better today - will discontinue sitter.  Continue present pharmacologic regimen.

## 2019-08-26 NOTE — PROGRESS NOTES
Inpatient Rehabilitation Plan of Care Note    Plan of Care  Care Plan Reviewed - Updates as Follows    Psychosocial    Performed Intervention(s)  Assist patient to express needs and concerns  calm enviroment      Safety    Performed Intervention(s)  Bed alarm, wc alarm  Safety rounds  Items within reach  24 hr sitter  24 hrs sitter      Body Systems    Performed Intervention(s)  Daily skin inspection      Sphincter Control    Performed Intervention(s)  Monitor intake and output  Encourage fluid intake  Elimination schedule  contact isolation    Signed by: Trupti Viramontes RN

## 2019-08-26 NOTE — PROGRESS NOTES
Inpatient Rehabilitation Functional Measures Assessment and Plan of Care    Plan of Care  Updated Problems/Interventions  Mobility    [OT] Toilet Transfers(Active)  Current Status(08/26/2019): SBA  Weekly Goal(08/27/2019): SBA  Discharge Goal: SBA    [OT] Tub/Shower Transfers(Active)  Current Status(08/26/2019): SBA  Weekly Goal(08/27/2019): SBA  Discharge Goal: SBA        Self Care    [OT] Bathing(Active)  Current Status(08/26/2019): CGA, vc's for throughness  Weekly Goal(08/27/2019): CGA  Discharge Goal: CGA    [OT] Dressing (Lower)(Active)  Current Status(08/26/2019): CGA/MIN  Weekly Goal(08/27/2019): CGA/MIN  Discharge Goal: CGA/MIN    [OT] Dressing (Upper)(Active)  Current Status(08/26/2019): MIN with bra fastening  Weekly Goal(08/27/2019): MIN  Discharge Goal: MIN    [OT] Grooming(Active)  Current Status(08/26/2019): supervision/set up  Weekly Goal(08/27/2019): supervision, set up  Discharge Goal: supervision, set up    [OT] Toileting(Active)  Current Status(08/26/2019): MIN  Weekly Goal(08/27/2019): MIN  Discharge Goal: MIN    Functional Measures  KATI Eating:  Branch  KATI Grooming: [place]  KATI Bathing:  [place]  KATI Upper Body Dressing:  [place]  KATI Lower Body Dressing:  [place]  KATI Toileting:  Branch    KATI Bladder Management  Level of Assistance:  [place]  Frequency/Number of Accidents this Shift:  Branch    KATI Bowel Management  Level of Assistance: [place]  Frequency/Number of Accidents this Shift: [place]    KATI Bed/Chair/Wheelchair Transfer:  [place]anch  KATI Toilet Transfer:  [place]anch  KATI Tub/Shower Transfer:  [place]    Previously Documented Mode of Locomotion at Discharge: Field  KATI Expected Mode of Locomotion at Discharge: [place]  KATI Walk/Wheelchair:  [place]anch  KATI Stairs:  [place]anch    KATI Comprehension:  [place]anch  KATI Expression:  [place]  KATI Social Interaction:  Branch  KATI Problem Solving:  [place]anch  KATI Memory:  Branch    Therapy Mode Minutes  Occupational Therapy: Individual: 30 minutes.  Physical Therapy:  Branch  Speech Language Pathology:  Branch    Signed by: Ingris Ansari, OT

## 2019-08-26 NOTE — PROGRESS NOTES
Spoke with Laura at [facility] and confirmed that they do have a bed for Ms Workman and can admit her tomorrow.  Spoke with Mr Workman today and he will be here tomorrow to have lunch with the pt before she transfers to [facility].   [facility] will provide wheelchair van transportation for pt tomorrow at 12:30.

## 2019-08-26 NOTE — THERAPY TREATMENT NOTE
Inpatient Rehabilitation - Occupational Therapy Treatment Note    [facility]     Patient Name: Darby Workman  : 1945  MRN: [id number]    Today's Date: 2019                 Admit Date: 2019      Visit Dx:  No diagnosis found.    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)   • Right-sided headache   • Acute ischemic stroke (CMS/HCC)   • Embolic stroke (CMS/HCC)   • Cerebral infarction due to stenosis of left carotid artery (CMS/HCC)   • Anticoagulated by anticoagulation treatment   • Stroke (cerebrum) (CMS/HCC)         Therapy Treatment    IRF Treatment Summary     Row Name 19 1311 19 1126 19       Evaluation/Treatment Time and Intent    Subjective Information  -- in AM agreeable to OT  -AF  -- unable to determine due to aphasia  -MD  no complaints  -AL    Existing Precautions/Restrictions  fall contact precautions   -AF  fall  -MD  fall  -AL    Document Type  therapy note (daily note)  -AF  therapy note (daily note)  -MD  therapy note (daily note)  -AL    Mode of Treatment  occupational therapy  -AF  physical therapy  -MD  speech-language pathology  -AL    Patient/Family Observations  in AM agreeable to therapy sitting up in bed. in PM repeating NO not willing to get out of bed, holding on to mouth swab, sheet and family picture, unabel to redirect and perservated on NO   -AF  Pt sitting in WC w SLP.  Pt showing no signs of acute distress.  -MD  Pt participated well in session.  -AL    Start Time (Evaluation/Treatment)  --  --  830  -AL    Stop Time (Evaluation/Treatment)  --  --  900  -AL    Unable to Perform (Evaluation/Treatment)  patient refused PM session  -AF  --  --    Recorded by [AF] Ingris Ansari, OTR [MD] Mira Chadwick, PT [AL] Lela Bueno, SLP    Row Name 19 1311 19 112           Cognition/Psychosocial- PT/OT    Affect/Mental Status (Cognitive)  anxious;confused  -AF  --     Behavioral Issues (Cognitive)  overwhelmed easily;difficulty managing stress  -AF  --     Orientation Status (Cognition)  unable/difficult to assess  -AF  unable/difficult to assess  -MD     Follows Commands (Cognition)  follows one step commands;25-49% accuracy with rote tasks  -AF  follows one step commands;25-49% accuracy;physical/tactile prompts required;repetition of directions required;verbal cues/prompting required  -MD     Personal Safety Interventions  fall prevention program maintained;gait belt;nonskid shoes/slippers when out of bed  -AF  fall prevention program maintained  -MD     Attention Deficit (Cognitive)  moderate deficit  -AF  --     Memory Deficit (Cognitive)  unable/difficult to assess  -AF  --     Safety Deficit (Cognitive)  moderate deficit;insight into deficits/self awareness;awareness of need for assistance  -AF  --     Recorded by [AF] Ingris Ansari OTR [MD] Mira Chadwick, PT     Row Name 08/26/19 1311             Bed Mobility Assessment/Treatment    Supine-Sit [place] (Bed Mobility)  supervision  -AF      Recorded by [AF] Ingris Ansari OTR      Row Name 08/26/19 1311             Functional Mobility    Functional Mobility- Comment  sit to stand with ADL tasks SBA  -AF      Recorded by [AF] Ingris Ansari OTR      Row Name 08/26/19 1126             Sit-Stand Transfer    Sit-Stand [place] (Transfers)  supervision  -MD      Recorded by [MD] Mira Chadwick PT      Row Name 08/26/19 1126             Stand-Sit Transfer    Stand-Sit [place] (Transfers)  supervision  -MD      Recorded by [MD] Mira Chadwick, PT      Row Name 08/26/19 1311             Toilet Transfer    Type (Toilet Transfer)  sit-stand;stand-sit  -AF      [place] Level (Toilet Transfer)  stand by assist  -AF      Assistive Device (Toilet Transfer)  commode;grab bars/safety frame  -AF      Recorded by  [AF] Ingris Ansari OTR      Row Name 08/26/19 1126             Gait/Stairs Assessment/Training    [place] Level (Gait)  supervision  -MD      Distance in Feet (Gait)  300  -MD      Pattern (Gait)  step-through  -MD      Deviations/Abnormal Patterns (Gait)  festinating/shuffling;gait speed decreased  -MD      Bilateral Gait Deviations  forward flexed posture  -MD      Recorded by [MD] Mira Chadwick PT      Row Name 08/26/19 1311             Bathing Assessment/Treatment    Bathing [place] Level  bathing skills;set up;verbal cues;contact guard assist  -AF      Bathing Position  supported sitting;supported standing;sink side  -AF      Comment (Bathing)  CGA for balance  -AF      Recorded by [AF] Ingris Ansari OTR      Row Name 08/26/19 1311             Upper Body Dressing Assessment/Treatment    Upper Body Dressing Task  upper body dressing skills;minimum assist (75% or more patient effort);verbal cues  -AF      Upper Body Dressing Position  supported sitting  -AF      Comment (Upper Body Dressing)  assist with fastening bra  -AF      Recorded by [AF] Ingris Ansari OTR      Row Name 08/26/19 1311             Lower Body Dressing Assessment/Treatment    Lower Body Dressing [place] Level  doff;don;pants/bottoms;shoes/slippers;socks;minimum assist (75% patient effort)  -AF      Lower Body Dressing Position  supported sitting  -AF      Comment (Lower Body Dressing)  assist with button and zipper on pants   -AF      Recorded by [AF] Ingris Ansari, OTR      Row Name 08/26/19 1311             Grooming Assessment/Treatment    Grooming [place] Level  grooming skills;verbal cues;supervision  -AF      Grooming Position  sink side;supported sitting  -AF      Comment (Grooming)  vc's to sequence task, set up with toothpaste  -AF      Recorded by [AF] Ingris Ansari OTR      Row Name 08/26/19 1311             Toileting Assessment/Treatment    Toileting [place] Level  toileting  skills;minimum assist (75% patient effort)  -AF      Assistive Device Use (Toileting)  grab bar/safety frame;raised toilet seat  -AF      Comment (Toileting)  assist with brief  -AF      Recorded by [AF] Ingris Ansari OTR      Row Name 08/26/19 1126 08/26/19 0830          Pain Scale: Numbers Pre/Post-Treatment    Pain Scale: Numbers, Pretreatment  0/10 - no pain  -MD  0/10 - no pain  -AL     Recorded by [MD] Mira Chadwick, PT [AL] Lela Bueno, [facility]     Row Name 08/26/19 1311             Pain Scale: FACES Pre/Post-Treatment    Pain: FACES Scale, Pretreatment  0-->no hurt  -AF      Pain: FACES Scale, Post-Treatment  0-->no hurt  -AF      Recorded by [AF] Ingris Ansari OTR      Row Name 08/26/19 1311             Static Sitting Balance    Level of [place] (Unsupported Sitting, Static Balance)  supervision  -AF      Recorded by [AF] Ingris Ansari OTR      Row Name 08/26/19 1311             Dynamic Sitting Balance    Level of [place], Reaches Outside Midline (Sitting, Dynamic Balance)  supervision  -AF      Recorded by [AF] Ingris Ansari OTR      Row Name 08/26/19 1311 08/26/19 1126          Positioning and Restraints    Pre-Treatment Position  in bed  -AF  sitting in chair/recliner  -MD     Post Treatment Position  wheelchair  -AF  --     In Wheelchair  sitting;with SLP  -AF  --     Recorded by [AF] Ingris Ansari OTKEVIN [MD] Mira Chadwick, PT       User Key  (r) = Recorded By, (t) = Taken By, (c) = Cosigned By    Initials Name Effective Dates    AF Ingris Ansari OTR 04/03/18 -     Mira Vaca, PT 04/03/18 -     Lela Holley SLP 07/22/19 -           Wound 07/17/19 1015 Left neck incision (Active)   Dressing Appearance open to air 8/26/2019  7:56 AM   Closure Liquid skin adhesive 8/26/2019  7:56 AM   Base clean;dry 8/26/2019  7:56 AM   Periwound intact 8/26/2019  7:56 AM   Periwound Temperature warm 8/26/2019  7:56 AM   [place]iwound Skin Turgor soft 8/26/2019  7:56 AM   Drainage Amount none  8/26/2019  7:56 AM   Dressing Care, Wound open to air 8/25/2019  7:21 PM         OT Recommendation and Plan    Anticipated Equipment Needs At Discharge (OT Eval): bathing equipment  Planned Therapy Interventions (OT Eval): activity tolerance training, BADL retraining, cognitive/visual perception retraining, functional balance retraining, neuromuscular control/coordination retraining, occupation/activity based interventions, patient/caregiver education/training, ROM/therapeutic exercise, strengthening exercise, transfer/mobility retraining            OT IRF GOALS     Row Name 08/26/19 1315 08/22/19 1543          Transfer Goal 1 (OT-IRF)    Progress/Outcomes (Transfer Goal 1, OT-IRF)  --  goal met  -AF        Transfer Goal 2 (OT-IRF)    Activity/Assistive Device (Transfer Goal 2, OT-IRF)  --  shower chair;walk-in shower;toilet  -AF     [place] Level (Transfer Goal 2, OT-IRF)  --  standby assist;contact guard assist  -AF     Time Frame (Transfer Goal 2, OT-IRF)  --  long term goal (LTG)  -AF     Progress/Outcomes (Transfer Goal 2, OT-IRF)  goal met  -AF  goal ongoing  -AF        Bathing Goal 1 (OT-IRF)    Progress/Outcomes (Bathing Goal 1, OT-IRF)  --  goal met  -AF        Bathing Goal 2 (OT-IRF)    Activity/Device (Bathing Goal 2, OT-IRF)  bathing skills, all;grab bar/tub rail;hand-held shower spray hose;shower chair  -AF  bathing skills, all;grab bar/tub rail;hand-held shower spray hose;shower chair  -AF     [place] Level (Bathing Goal 2, OT-IRF)  standby assist  -AF  contact guard assist  -AF     Time Frame (Bathing Goal 2, OT-IRF)  long term goal (LTG)  -AF  long term goal (LTG)  -AF     Progress/Outcomes (Bathing Goal 2, OT-IRF)  goal ongoing  -AF  goal ongoing  -AF        UB Dressing Goal 1 (OT-IRF)    Activity/Device (UB Dressing Goal 1, OT-IRF)  upper body dressing  -AF  upper body dressing  -AF     [place] (UB Dress Goal 1, OT-IRF)  set-up required  -AF  set-up required  -AF     Time Frame (UB  Dressing Goal 1, OT-IRF)  long term goal (LTG)  -AF  long term goal (LTG)  -AF     Progress/Outcomes (UB Dressing Goal 1, OT-IRF)  goal ongoing  -AF  goal ongoing  -AF        LB Dressing Goal 1 (OT-IRF)    Progress/Outcomes (LB Dressing Goal 1, OT-IRF)  --  goal met  -AF        LB Dressing Goal 2 (OT-IRF)    Activity/Device (LB Dressing Goal 2, OT-IRF)  lower body dressing  -AF  lower body dressing  -AF     [place] (LB Dressing Goal 2, OT-IRF)  standby assist;verbal cues required  -AF  verbal cues required;contact guard assist  -AF     Time Frame (LB Dressing Goal 2, OT-IRF)  long term goal (LTG)  -AF  long term goal (LTG)  -AF     Progress/Outcomes (LB Dressing Goal 2, OT-IRF)  goal ongoing  -AF  goal ongoing  -AF        Grooming Goal 1 (OT-IRF)    Progress/Outcomes (Grooming Goal 1, OT-IRF)  --  goal met  -AF        Grooming Goal 2 (OT-IRF)    Activity/Device (Grooming Goal 2, OT-IRF)  --  grooming skills, all  -AF     [place] (Grooming Goal 2, OT-IRF)  --  supervision required  -AF     Time Frame (Grooming Goal 2, OT-IRF)  --  long term goal (LTG)  -AF     Progress/Outcomes (Grooming Goal 2, OT-IRF)  goal met  -AF  goal ongoing  -AF        Toileting Goal 1 (OT-IRF)    Progress/Outcomes (Toileting Goal 1, OT-IRF)  --  goal met  -AF        Toileting Goal 2 (OT-IRF)    Activity/Device (Toileting Goal 2, OT-IRF)  toileting skills, all;grab bar/safety frame  -AF  toileting skills, all  -AF     [place] Level (Toileting Goal 2, OT-IRF)  standby assist;contact guard assist  -AF  verbal cues required;contact guard assist  -AF     Time Frame (Toileting Goal 2, OT-IRF)  long term goal (LTG)  -AF  long term goal (LTG)  -AF     Progress/Outcomes (Toileting Goal 2, OT-IRF)  goal ongoing  -AF  goal ongoing  -AF        Caregiver Training Goal 1 (OT-IRF)    Caregiver Training Goal 1 (OT-IRF)  pt and family to demo safe techniques with ADLs, transfers, HEP and Adaptive equipment as needed prior to d/c   -AF  family  and patient to demo safe technqiues with ADLs, transfers, HEP and adaptive equipment as needed prior to d/c   -AF     Time Frame (Caregiver Training Goal 1, OT-IRF)  long term goal (LTG)  -AF  long term goal (LTG)  -AF     Progress/Outcomes (Caregiver Training Goal 1, OT-IRF)  goal ongoing  -AF  goal ongoing  -AF       User Key  (r) = Recorded By, (t) = Taken By, (c) = Cosigned By    Initials Name Provider Type    Ingris Youssef OTR Occupational Therapist          Occupational Therapy Education     Title: PT OT SLP Therapies (Not Started)     Topic: Occupational Therapy (In Progress)     Point: ADL training (In Progress)     Description: Instruct learner(s) on proper safety adaptation and remediation techniques during self care or transfers.   Instruct in proper use of assistive devices.    Learning Progress Summary           Patient Acceptance, E, NR,NL by WN at 8/19/2019 11:08 PM    Nonacceptance, E,D, NR by JS at 8/17/2019 11:44 AM    Acceptance, E, VU,NR by AF at 8/15/2019  3:22 PM    Comment:  Pt and family participated in meeting with rehab team, recommend 24 hour supervision and would beneift from being in her own environment. discussed her safety awareness and the need for hands on assistance with some ADL tasks seocndary R UE and cognition    Nonacceptance, E, NR by AF at 8/13/2019  3:48 PM    Comment:  benefits and purpose of therapy   Family Acceptance, E, VU,NR by AF at 8/15/2019  3:22 PM    Comment:  Pt and family participated in meeting with rehab team, recommend 24 hour supervision and would beneift from being in her own environment. discussed her safety awareness and the need for hands on assistance with some ADL tasks seocndary R UE and cognition                   Point: Home exercise program (In Progress)     Description: Instruct learner(s) on appropriate technique for monitoring, assisting and/or progressing therapeutic exercises/activities.    Learning Progress Summary           Patient  Acceptance, E, NR,NL by WN at 8/19/2019 11:08 PM    Nonacceptance, E,D, NR by JS at 8/17/2019 11:44 AM    Acceptance, E, VU,NR by AF at 8/15/2019  3:22 PM    Comment:  Pt and family participated in meeting with rehab team, recommend 24 hour supervision and would beneift from being in her own environment. discussed her safety awareness and the need for hands on assistance with some ADL tasks seocndary R UE and cognition   Family Acceptance, E, VU,NR by AF at 8/15/2019  3:22 PM    Comment:  Pt and family participated in meeting with rehab team, recommend 24 hour supervision and would beneift from being in her own environment. discussed her safety awareness and the need for hands on assistance with some ADL tasks seocndary R UE and cognition                               User Key     Initials Effective Dates Name Provider Type Discipline     04/06/17 -  Steph Salinas, PT Physical Therapist PT    AF 04/03/18 -  Ingris Ansari, OTR Occupational Therapist OT    JHONNY 06/24/19 -  Luis Abernathy, RN Registered Nurse Nurse                       Time Calculation:     Time Calculation- OT     Row Name 08/26/19 1318             Time Calculation- OT    OT Start Time  0800  -AF      OT Stop Time  0830  -AF      OT Time Calculation (min)  30 min  -AF        User Key  (r) = Recorded By, (t) = Taken By, (c) = Cosigned By    Initials Name Provider Type    AF Ingris Ansari, OTR Occupational Therapist          Therapy Charges for Today     Code Description Service Date Service Provider Modifiers Qty    [id number] HC OT SELF CARE/MGMT/TRAIN EA 15 MIN 8/26/2019 Ingris Ansari OTR GO 2                   JOHN Mejia  8/26/2019

## 2019-08-26 NOTE — PROGRESS NOTES
Inpatient Rehabilitation Plan of Care Note    Plan of Care  Care Plan Reviewed - No updates at this time.    Psychosocial    Performed Intervention(s)  Assist patient to express needs and concerns  calm enviroment      Safety    Performed Intervention(s)  Bed alarm, wc alarm  Safety rounds  Items within reach  24 hrs sitter      Sphincter Control    Performed Intervention(s)  Monitor intake and output  Encourage fluid intake  Elimination schedule    Signed by: Eda Allen RN

## 2019-08-27 ENCOUNTER — TELEPHONE (OUTPATIENT)
Dept: NEUROSURGERY | Facility: CLINIC | Age: 74
End: 2019-08-27

## 2019-08-27 LAB
GLUCOSE BLDC GLUCOMTR-MCNC: 125 MG/DL (ref 70–130)
GLUCOSE BLDC GLUCOMTR-MCNC: 138 MG/DL (ref 70–130)
GLUCOSE BLDC GLUCOMTR-MCNC: 194 MG/DL (ref 70–130)
GLUCOSE BLDC GLUCOMTR-MCNC: 64 MG/DL (ref 70–130)
GLUCOSE BLDC GLUCOMTR-MCNC: 77 MG/DL (ref 70–130)

## 2019-08-27 PROCEDURE — 63710000001 INSULIN LISPRO (HUMAN) PER 5 UNITS: Performed by: PHYSICAL MEDICINE & REHABILITATION

## 2019-08-27 PROCEDURE — 82962 GLUCOSE BLOOD TEST: CPT

## 2019-08-27 PROCEDURE — 92526 ORAL FUNCTION THERAPY: CPT

## 2019-08-27 PROCEDURE — 99024 POSTOP FOLLOW-UP VISIT: CPT | Performed by: NURSE PRACTITIONER

## 2019-08-27 PROCEDURE — 92507 TX SP LANG VOICE COMM INDIV: CPT

## 2019-08-27 PROCEDURE — 97535 SELF CARE MNGMENT TRAINING: CPT

## 2019-08-27 RX ADMIN — BRIMONIDINE TARTRATE 1 DROP: 2 SOLUTION OPHTHALMIC at 22:06

## 2019-08-27 RX ADMIN — GLIPIZIDE 5 MG: 5 TABLET ORAL at 09:16

## 2019-08-27 RX ADMIN — NEBIVOLOL HYDROCHLORIDE 20 MG: 10 TABLET ORAL at 09:16

## 2019-08-27 RX ADMIN — LANSOPRAZOLE 30 MG: KIT at 06:21

## 2019-08-27 RX ADMIN — DORZOLAMIDE HYDROCHLORIDE 1 DROP: 20 SOLUTION/ DROPS OPHTHALMIC at 22:06

## 2019-08-27 RX ADMIN — NYSTATIN 500000 UNITS: 100000 SUSPENSION ORAL at 11:19

## 2019-08-27 RX ADMIN — INSULIN LISPRO 2 UNITS: 100 INJECTION, SOLUTION INTRAVENOUS; SUBCUTANEOUS at 22:11

## 2019-08-27 RX ADMIN — METFORMIN HYDROCHLORIDE 1000 MG: 1000 TABLET ORAL at 09:16

## 2019-08-27 RX ADMIN — DORZOLAMIDE HYDROCHLORIDE 1 DROP: 20 SOLUTION/ DROPS OPHTHALMIC at 09:16

## 2019-08-27 RX ADMIN — ATORVASTATIN CALCIUM 80 MG: 80 TABLET, FILM COATED ORAL at 22:05

## 2019-08-27 RX ADMIN — HYDRALAZINE HYDROCHLORIDE 50 MG: 50 TABLET, FILM COATED ORAL at 06:21

## 2019-08-27 RX ADMIN — AMLODIPINE BESYLATE 5 MG: 5 TABLET ORAL at 22:05

## 2019-08-27 RX ADMIN — APIXABAN 5 MG: 5 TABLET, FILM COATED ORAL at 22:05

## 2019-08-27 RX ADMIN — NEBIVOLOL HYDROCHLORIDE 20 MG: 10 TABLET ORAL at 22:05

## 2019-08-27 RX ADMIN — AMLODIPINE BESYLATE 5 MG: 5 TABLET ORAL at 09:16

## 2019-08-27 RX ADMIN — METFORMIN HYDROCHLORIDE 1000 MG: 1000 TABLET ORAL at 17:47

## 2019-08-27 RX ADMIN — NYSTATIN 500000 UNITS: 100000 SUSPENSION ORAL at 22:05

## 2019-08-27 RX ADMIN — POTASSIUM CHLORIDE 20 MEQ: 1.5 POWDER, FOR SOLUTION ORAL at 09:16

## 2019-08-27 RX ADMIN — PAROXETINE HYDROCHLORIDE 10 MG: 10 TABLET, FILM COATED ORAL at 09:16

## 2019-08-27 RX ADMIN — HYDRALAZINE HYDROCHLORIDE 50 MG: 50 TABLET, FILM COATED ORAL at 22:05

## 2019-08-27 RX ADMIN — GLIPIZIDE 5 MG: 5 TABLET ORAL at 17:46

## 2019-08-27 RX ADMIN — LANSOPRAZOLE 30 MG: KIT at 16:42

## 2019-08-27 RX ADMIN — NYSTATIN 500000 UNITS: 100000 SUSPENSION ORAL at 09:16

## 2019-08-27 RX ADMIN — LOSARTAN POTASSIUM: 50 TABLET, FILM COATED ORAL at 09:16

## 2019-08-27 RX ADMIN — HYDRALAZINE HYDROCHLORIDE 50 MG: 50 TABLET, FILM COATED ORAL at 13:43

## 2019-08-27 RX ADMIN — APIXABAN 5 MG: 5 TABLET, FILM COATED ORAL at 09:16

## 2019-08-27 RX ADMIN — ASPIRIN 325 MG: 325 TABLET ORAL at 09:16

## 2019-08-27 RX ADMIN — NYSTATIN 500000 UNITS: 100000 SUSPENSION ORAL at 16:42

## 2019-08-27 RX ADMIN — BRIMONIDINE TARTRATE 1 DROP: 2 SOLUTION OPHTHALMIC at 09:16

## 2019-08-27 NOTE — THERAPY TREATMENT NOTE
Inpatient Rehabilitation - Occupational Therapy Treatment Note    [facility]     Patient Name: Darby Workman  : 1945  MRN: [id number]    Today's Date: 2019                 Admit Date: 2019      Visit Dx:  No diagnosis found.    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)   • Right-sided headache   • Acute ischemic stroke (CMS/HCC)   • Embolic stroke (CMS/HCC)   • Cerebral infarction due to stenosis of left carotid artery (CMS/HCC)   • Anticoagulated by anticoagulation treatment   • Stroke (cerebrum) (CMS/HCC)         Therapy Treatment    IRF Treatment Summary     Row Name 19 1503 19 0860          Evaluation/Treatment Time and Intent    Subjective Information  no complaints  -AF  no complaints  -AL     Existing Precautions/Restrictions  fall contact precautions, global aphasia  -AF  fall;other (see comments) communication  -AL     Document Type  therapy note (daily note)  -AF  therapy note (daily note)  -AL     Mode of Treatment  occupational therapy  -AF  speech-language pathology  -AL     Patient/Family Observations  pt participated in AM session, in PM session pt sleeping   -AF  Pt refused to leave dining room; however, participated in therapy. At end of session, indicated she wanted to eat her breakfast.  -AL     Start Time (Evaluation/Treatment)  --  45  -AL     Stop Time (Evaluation/Treatment)  --  915  -AL     Recorded by [AF] Ingris Ansari, OTR [AL] Lela Bueno, SLP     Row Name 19 1509             Cognition/Psychosocial- PT/OT    Affect/Mental Status (Cognitive)  anxious;confused  -AF      Behavioral Issues (Cognitive)  difficulty managing stress;overwhelmed easily  -AF      Orientation Status (Cognition)  unable/difficult to assess  -AF      Follows Commands (Cognition)  follows one step  commands;25-49% accuracy;increased processing time needed;initiation impaired;verbal cues/prompting required  -AF      Personal Safety Interventions  fall prevention program maintained;gait belt;nonskid shoes/slippers when out of bed  -AF      Memory Deficit (Cognitive)  unable/difficult to assess  -AF      Safety Deficit (Cognitive)  awareness of need for assistance;insight into deficits/self awareness;judgment  -AF      Recorded by [AF] Ingris Ansari, OTR      Row Name 08/27/19 1503             Bed Mobility Assessment/Treatment    Supine-Sit [place] (Bed Mobility)  supervision  -AF      Recorded by [AF] Ingris Ansari, OTR      Row Name 08/27/19 1503             Functional Mobility    Functional Mobility- Comment  walked in room and bathroom SBA without AD  -AF      Recorded by [AF] Ingris Ansari, OTR      Row Name 08/27/19 1503             Bed-Chair Transfer    Bed-Chair [place] (Transfers)  stand by assist  -AF      Recorded by [AF] Ingris Ansari, OTR      Row Name 08/27/19 1503             Sit-Stand Transfer    Sit-Stand [place] (Transfers)  supervision  -AF      Recorded by [AF] Ingris Ansari, OTR      Row Name 08/27/19 1503             Stand-Sit Transfer    Stand-Sit [place] (Transfers)  supervision  -AF      Recorded by [AF] Ingris Ansari, OTR      Row Name 08/27/19 1503             Toilet Transfer    Type (Toilet Transfer)  sit-stand;stand-sit  -AF      [place] Level (Toilet Transfer)  stand by assist  -AF      Assistive Device (Toilet Transfer)  commode;grab bars/safety frame  -AF      Recorded by [AF] Ingris Ansari, OTR      Row Name 08/27/19 1503             Bathing Assessment/Treatment    Comment (Bathing)  deferred bathing, didn't want to change clothes this AM agreeable to using bathroom and grooming tasks   -AF      Recorded by [AF] Ingris Ansari, OTR      Row Name 08/27/19 1503             Grooming Assessment/Treatment    Grooming [place]  Level  grooming skills;standby assist;verbal cues;set up  -AF      Grooming Position  sink side;supported sitting;unsupported standing  -AF      Comment (Grooming)  stood to wash hands after using bathroom, sat w/c level to brush her teeth  -AF      Recorded by [AF] Ingris Ansari OTR      Row Name 08/27/19 1503             Toileting Assessment/Treatment    Toileting [place] Level  toileting skills;contact guard assist;verbal cues  -AF      Assistive Device Use (Toileting)  grab bar/safety frame;raised toilet seat  -AF      Toileting Position  unsupported standing;supported sitting  -AF      Comment (Toileting)  assist with brief, vc's for throughness with hygiene tasks   -AF      Recorded by [AF] Ingris Ansari OTR      Row Name 08/27/19 1503             Self-Feeding Assessment/Treatment    [place] Level (Self-Feeding)  feeding skills;set up;supervision;verbal cues  -AF      Position (Self-Feeding)  supported sitting  -AF      Comment (Self-Feeding)  therapist to open banana, then pt took a couple of bites, wouldn't use fork or spoon with rest of meal, perservatve and unable to redirect to eat other items   -AF      Recorded by [AF] Ingris Ansari OTR      Row Name 08/27/19 1503             BADL Safety/Performance    Impairments, BADL Safety/Performance  cognition  -AF      Cognitive Impairments, BADL Safety/Performance  insight into deficits/self awareness;problem solving/reasoning;safety precaution awareness;sequencing abilities  -AF      Recorded by [AF] Ingris Ansari OTR      Row Name 08/27/19 1503 08/27/19 0842          Pain Scale: Numbers Pre/Post-Treatment    Pain Scale: Numbers, Pretreatment  0/10 - no pain  -AF  0/10 - no pain  -AL     Pain Scale: Numbers, Post-Treatment  0/10 - no pain  -AF  --     Recorded by [AF] Ingris Ansari, OTR [AL] Lela Bueno, SLP     Row Name 08/27/19 1503             Static Sitting Balance    Level of [place] (Unsupported Sitting, Static  Balance)  supervision  -AF      Recorded by [AF] Ingris Ansari OTR      Row Name 08/27/19 1503             Static Standing Balance    Level of [place] (Supported Standing, Static Balance)  standby assist  -AF      Comment (Unsupported Standing, Static Balance)  during toileting tasks   -AF      Recorded by [AF] Ingris Ansari OTR      Row Name 08/27/19 1503             Positioning and Restraints    Pre-Treatment Position  in bed  -AF      Post Treatment Position  wheelchair  -AF      In Wheelchair  sitting;exit alarm on;with other staff in dining room with breakfast tray  -AF      Recorded by [AF] Ingris Ansari OTR        User Key  (r) = Recorded By, (t) = Taken By, (c) = Cosigned By    Initials Name Effective Dates    AF Ingris Ansari OTR 04/03/18 -     Lela Holley, SLP 07/22/19 -           Wound 07/17/19 1015 Left neck incision (Active)   Dressing Appearance open to air 8/27/2019  9:00 AM   Closure Liquid skin adhesive 8/27/2019  9:00 AM   Base clean;dry 8/27/2019  9:00 AM   Periwound intact 8/27/2019  9:00 AM   [place] Temperature warm 8/27/2019  9:00 AM   [place] Skin Turgor soft 8/27/2019  9:00 AM   Drainage Amount none 8/27/2019  9:00 AM   Dressing Care, Wound open to air 8/26/2019 11:29 PM         OT Recommendation and Plan    Anticipated Equipment Needs At Discharge (OT Eval): bathing equipment  Planned Therapy Interventions (OT Eval): activity tolerance training, BADL retraining, cognitive/visual perception retraining, functional balance retraining, neuromuscular control/coordination retraining, occupation/activity based interventions, patient/caregiver education/training, ROM/therapeutic exercise, strengthening exercise, transfer/mobility retraining            OT IRF GOALS     Row Name 08/26/19 1315 08/22/19 1543          Transfer Goal 1 (OT-IRF)    Progress/Outcomes (Transfer Goal 1, OT-IRF)  --  goal met  -AF        Transfer Goal 2 (OT-IRF)    Activity/Assistive Device  (Transfer Goal 2, OT-IRF)  --  shower chair;walk-in shower;toilet  -AF     [place] Level (Transfer Goal 2, OT-IRF)  --  standby assist;contact guard assist  -AF     Time Frame (Transfer Goal 2, OT-IRF)  --  long term goal (LTG)  -AF     Progress/Outcomes (Transfer Goal 2, OT-IRF)  goal met  -AF  goal ongoing  -AF        Bathing Goal 1 (OT-IRF)    Progress/Outcomes (Bathing Goal 1, OT-IRF)  --  goal met  -AF        Bathing Goal 2 (OT-IRF)    Activity/Device (Bathing Goal 2, OT-IRF)  bathing skills, all;grab bar/tub rail;hand-held shower spray hose;shower chair  -AF  bathing skills, all;grab bar/tub rail;hand-held shower spray hose;shower chair  -AF     [place] Level (Bathing Goal 2, OT-IRF)  standby assist  -AF  contact guard assist  -AF     Time Frame (Bathing Goal 2, OT-IRF)  long term goal (LTG)  -AF  long term goal (LTG)  -AF     Progress/Outcomes (Bathing Goal 2, OT-IRF)  goal ongoing  -AF  goal ongoing  -AF        UB Dressing Goal 1 (OT-IRF)    Activity/Device (UB Dressing Goal 1, OT-IRF)  upper body dressing  -AF  upper body dressing  -AF     [place] (UB Dress Goal 1, OT-IRF)  set-up required  -AF  set-up required  -AF     Time Frame (UB Dressing Goal 1, OT-IRF)  long term goal (LTG)  -AF  long term goal (LTG)  -AF     Progress/Outcomes (UB Dressing Goal 1, OT-IRF)  goal ongoing  -AF  goal ongoing  -AF        LB Dressing Goal 1 (OT-IRF)    Progress/Outcomes (LB Dressing Goal 1, OT-IRF)  --  goal met  -AF        LB Dressing Goal 2 (OT-IRF)    Activity/Device (LB Dressing Goal 2, OT-IRF)  lower body dressing  -AF  lower body dressing  -AF     [place] (LB Dressing Goal 2, OT-IRF)  standby assist;verbal cues required  -AF  verbal cues required;contact guard assist  -AF     Time Frame (LB Dressing Goal 2, OT-IRF)  long term goal (LTG)  -AF  long term goal (LTG)  -AF     Progress/Outcomes (LB Dressing Goal 2, OT-IRF)  goal ongoing  -AF  goal ongoing  -AF        Grooming Goal 1 (OT-IRF)     Progress/Outcomes (Grooming Goal 1, OT-IRF)  --  goal met  -AF        Grooming Goal 2 (OT-IRF)    Activity/Device (Grooming Goal 2, OT-IRF)  --  grooming skills, all  -AF     [place] (Grooming Goal 2, OT-IRF)  --  supervision required  -AF     Time Frame (Grooming Goal 2, OT-IRF)  --  long term goal (LTG)  -AF     Progress/Outcomes (Grooming Goal 2, OT-IRF)  goal met  -AF  goal ongoing  -AF        Toileting Goal 1 (OT-IRF)    Progress/Outcomes (Toileting Goal 1, OT-IRF)  --  goal met  -AF        Toileting Goal 2 (OT-IRF)    Activity/Device (Toileting Goal 2, OT-IRF)  toileting skills, all;grab bar/safety frame  -AF  toileting skills, all  -AF     [place] Level (Toileting Goal 2, OT-IRF)  standby assist;contact guard assist  -AF  verbal cues required;contact guard assist  -AF     Time Frame (Toileting Goal 2, OT-IRF)  long term goal (LTG)  -AF  long term goal (LTG)  -AF     Progress/Outcomes (Toileting Goal 2, OT-IRF)  goal ongoing  -AF  goal ongoing  -AF        Caregiver Training Goal 1 (OT-IRF)    Caregiver Training Goal 1 (OT-IRF)  pt and family to demo safe techniques with ADLs, transfers, HEP and Adaptive equipment as needed prior to d/c   -AF  family and patient to demo safe technqiues with ADLs, transfers, HEP and adaptive equipment as needed prior to d/c   -AF     Time Frame (Caregiver Training Goal 1, OT-IRF)  long term goal (LTG)  -AF  long term goal (LTG)  -AF     Progress/Outcomes (Caregiver Training Goal 1, OT-IRF)  goal ongoing  -AF  goal ongoing  -AF       User Key  (r) = Recorded By, (t) = Taken By, (c) = Cosigned By    Initials Name Provider Type    Ingris Youssef OTR Occupational Therapist          Occupational Therapy Education     Title: PT OT SLP Therapies (Not Started)     Topic: Occupational Therapy (In Progress)     Point: ADL training (In Progress)     Description: Instruct learner(s) on proper safety adaptation and remediation techniques during self care or transfers.    Instruct in proper use of assistive devices.    Learning Progress Summary           Patient Acceptance, E, NR,NL by JHONNY at 8/19/2019 11:08 PM    Nonacceptance, E,D, NR by DONI at 8/17/2019 11:44 AM    Acceptance, E, VU,NR by AF at 8/15/2019  3:22 PM    Comment:  Pt and family participated in meeting with rehab team, recommend 24 hour supervision and would beneift from being in her own environment. discussed her safety awareness and the need for hands on assistance with some ADL tasks seocndary R UE and cognition    Nonacceptance, E, NR by AF at 8/13/2019  3:48 PM    Comment:  benefits and purpose of therapy   Family Acceptance, E, VU,NR by AF at 8/15/2019  3:22 PM    Comment:  Pt and family participated in meeting with rehab team, recommend 24 hour supervision and would beneift from being in her own environment. discussed her safety awareness and the need for hands on assistance with some ADL tasks seocndary R UE and cognition                   Point: Home exercise program (In Progress)     Description: Instruct learner(s) on appropriate technique for monitoring, assisting and/or progressing therapeutic exercises/activities.    Learning Progress Summary           Patient Acceptance, E, NR,NL by JHONNY at 8/19/2019 11:08 PM    Nonacceptance, E,D, NR by DONI at 8/17/2019 11:44 AM    Acceptance, E, VU,NR by AF at 8/15/2019  3:22 PM    Comment:  Pt and family participated in meeting with rehab team, recommend 24 hour supervision and would beneift from being in her own environment. discussed her safety awareness and the need for hands on assistance with some ADL tasks seocndary R UE and cognition   Family Acceptance, E, VU,NR by AF at 8/15/2019  3:22 PM    Comment:  Pt and family participated in meeting with rehab team, recommend 24 hour supervision and would beneift from being in her own environment. discussed her safety awareness and the need for hands on assistance with some ADL tasks seocndary R UE and cognition                                User Key     Initials Effective Dates Name Provider Type Discipline    JS 04/06/17 -  Steph Salinas, HESHAM Physical Therapist PT    AF 04/03/18 -  Ingris Ansari, OTR Occupational Therapist OT    WN 06/24/19 -  Luis Abernathy, RN Registered Nurse Nurse                       Time Calculation:     Time Calculation- OT     Row Name 08/27/19 1509             Time Calculation- OT    OT Start Time  0800  -AF      OT Stop Time  0830  -AF      OT Time Calculation (min)  30 min  -AF        User Key  (r) = Recorded By, (t) = Taken By, (c) = Cosigned By    Initials Name Provider Type    AF Ingris Ansari, OTR Occupational Therapist          Therapy Charges for Today     Code Description Service Date Service Provider Modifiers Qty    68025633169 HC OT SELF CARE/MGMT/TRAIN EA 15 MIN 8/26/2019 Ingris Ansari, OTKEVIN GO 2    55985544711 HC OT SELF CARE/MGMT/TRAIN EA 15 MIN 8/27/2019 Ingris Ansari OTKEVIN GO 2                   JOHN Mejia  8/27/2019

## 2019-08-27 NOTE — PROGRESS NOTES
Inpatient Rehabilitation Functional Measures Assessment    Functional Measures  KATI Eating:  [facility] Grooming: [facility] Bathing:  [facility] Upper Body Dressing:  [facility] Lower Body Dressing:  [facility] Toileting:  [facility] Bladder Management  Level of Assistance:  [place]  Frequency/Number of Accidents this Shift:  [facility] Bowel Management  Level of Assistance: [place]  Frequency/Number of Accidents this Shift: [facility] Bed/Chair/Wheelchair Transfer:  [facility] Toilet Transfer:  [facility] Tub/Shower Transfer:  [place]    Previously Documented Mode of Locomotion at Discharge: Field  KATI Expected Mode of Locomotion at Discharge: [facility] Walk/Wheelchair:  [facility] Stairs:  [facility] Comprehension:  Auditory comprehension is the usual mode. Patient does not  comprehend complex/abstract information in their primary language without  assistance from a helper. Comprehension Score = 2, Maximal Prompting. Patient  comprehends basic daily needs 25-49% of the time. Patient understands simple  information via single words or gestures. Requires maximal/a lot of prompting  (most of the time). No assistive devices were required.  KATI Expression:  Non-vocal expression is the usual mode. Patient does not  express complex/abstract information in their primary language without a helper.  Expression Score = 2, Maximal Prompting. Patient expresses basic daily needs  25-49% of the time. Patient uses only single words or gestures and requires  maximal/a lot of prompting (most of the time). Patient requires the following  assistive device(s): Communication board/device. hand gestures .  KATI Social Interaction:  Activity was not observed.  KATI Problem Solving:  Activity was not observed.  KATI Memory:  Activity was not observed. KRYSTYNA d/t aphasia    Therapy Mode Minutes  Occupational Therapy: [place]  Physical Therapy: [place]  Speech Language Pathology:  [place]    Signed by: Trupti Viramontes RN

## 2019-08-27 NOTE — PROGRESS NOTES
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 RESTLESS LAST NIGHT AND RECEIVED ZYPREXA 5 mg IM at 20:14. . SLEPT AFTER ZYPREXA.PATIENT TOOK HER MEDICATIONS THIS MORNING. COOPERATIVE THIS AM. DOES NOT IDENTIFY ANY NEW COMPLAINT TODAY ON QUESTIONING. FOLLOWING INSTRUCTIONS. CONTINUES WITH APHASIA.  WILL HAVE PSYCHIATRY SEE IN FOLLOW UP FOR ANY FINAL ADJUSTMENTS IN REGIMEN BEFORE DISCHARGE TO SUBACUTE. CANNOT GO TO THE SUBACUTE FACILITY ON A PRN MEDICATION.       History taken from: patient chart RN    Objective     Vital Signs  Temp:  [96.4 °F (35.8 °C)-98 °F (36.7 °C)] 97.4 °F (36.3 °C)  Heart Rate:  [64-72] 66  Resp:  [18-20] 20  BP: (119-165)/(67-78) 165/72    Objective  Physical Exam    MENTAL STATUS -  AWAKE / ALERT.  NOT ANXIOUS PRESENTLY. SMILING.  HEENT- NCAT,   SCLERA NON-ICTERIC, CONJUNCTIVA PINK, OP MOIST, NO JVD   LUNGS - normal respirations.  Clear to auscultation  HEART- RRR   ABD -   non-distended  EXT - NO EDEMA OR CYANOSIS  NEURO -alert.  Aphasia.  She will get occasional single word such as 'yes\" or \"no\" out. Follows instructions.    MOTOR EXAM -takes resistance bilaterally.         Results Review:     I reviewed the patient's new clinical results.     CT HEAD - AUGUST 11, 2019  FINDINGS:  Old appearing lacunar type infarcts are again noted about the  right " thalamus and basal ganglia regions. There are stable areas of  encephalomalacia in the left parietal and occipital lobes medially.  Generalized atrophy. There are moderately extensive scattered areas of  decreased density in the white matter likely related to chronic ischemic  gliotic changes.    No hydrocephalus.    Glucose   Date/Time Value Ref Range Status   08/27/2019 0726 125 70 - 130 mg/dL Final   08/26/2019 1104 131 (H) 70 - 130 mg/dL Final   08/26/2019 0658 147 (H) 70 - 130 mg/dL Final   08/25/2019 2005 91 70 - 130 mg/dL Final   08/25/2019 1553 120 70 - 130 mg/dL Final   08/25/2019 1146 118 70 - 130 mg/dL Final   08/25/2019 0716 110 70 - 130 mg/dL Final   08/24/2019 2200 124 70 - 130 mg/dL Final     Results from last 7 days   Lab Units 08/26/19 0658 08/23/19 0631 08/21/19  0605   WBC 10*3/mm3 5.67 4.21 4.86   HEMOGLOBIN g/dL 9.7* 9.5* 9.8*   HEMATOCRIT % 31.1* 30.6* 30.6*   PLATELETS 10*3/mm3 270 306 324       Ref. Range 5/22/2019 05:44 5/22/2019 11:34 7/9/2019 08:25 7/18/2019 04:49 7/19/2019 05:05 7/23/2019 05:56 8/1/2019 06:48   Hemoglobin Latest Ref Range: 12.0 - 15.9 g/dL 10.8 (L)  10.6 (L) 8.5 (L) 9.7 (L) 9.3 (L) 7.4 (L)   Hematocrit Latest Ref Range: 34.0 - 46.6 % 35.1  33.4 (L) 26.2 (L) 29.9 (L) 28.6 (L) 23.6 (L)     Results from last 7 days   Lab Units 08/26/19 0658 08/23/19 0631 08/21/19  0605   SODIUM mmol/L 141 137 138   POTASSIUM mmol/L 3.6 3.6 3.5   CHLORIDE mmol/L 104 101 99   CO2 mmol/L 27.0 28.4 28.1   BUN mg/dL 20 17 19   CREATININE mg/dL 0.72 0.73 0.65   CALCIUM mg/dL 8.5* 9.1 8.8   GLUCOSE mg/dL 142* 108* 117*         Ref. Range 8/1/2019 06:47   25 Hydroxy, Vitamin D Latest Ref Range: 30.0 - 100.0 ng/ml 21.8 (L)       Ref. Range 8/1/2019 06:47   Total Cholesterol Latest Ref Range: 0 - 200 mg/dL 105   HDL Cholesterol Latest Ref Range: 40 - 60 mg/dL 40   LDL Cholesterol  Latest Ref Range: 0 - 100 mg/dL 57   VLDL Cholesterol Latest Ref Range: 5 - 40 mg/dL 8   Triglycerides Latest Ref  Range: 0 - 150 mg/dL 40   Medication Review: done  Scheduled Meds:    amLODIPine 5 mg Oral BID - RT   apixaban 5 mg Oral Q12H   aspirin 325 mg Oral Daily   atorvastatin 80 mg Oral Nightly   brimonidine 1 drop Both Eyes BID   dorzolamide 1 drop Both Eyes BID   glipiZIDE 5 mg Oral BID AC   hydrALAZINE 50 mg Oral Q8H   insulin lispro 0-7 Units Subcutaneous 4x Daily With Meals & Nightly   lansoprazole 30 mg Oral BID AC   losartan-HCTZ (HYZAAR) 100-12.5 combo dose  Oral Daily   metFORMIN 1,000 mg Oral BID With Meals   nebivolol 20 mg Oral BID   nystatin 5 mL Swish & Spit 4x Daily   PARoxetine 10 mg Oral Daily   potassium chloride 20 mEq Oral Daily With Breakfast   vitamin D 50,000 Units Oral Q7 Days     Continuous Infusions:   PRN Meds:.•  acetaminophen  •  aluminum-magnesium hydroxide-simethicone  •  dextrose  •  dextrose  •  glucagon (human recombinant)  •  nitroglycerin  •  OLANZapine      Assessment/Plan       Stroke (cerebrum) (CMS/formerly [facility])      Assessment & Plan  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  If add Namenda, will probably discontinue amantadine as she continues alert.  August 11-she has some increased irritability at times.  This may be related to the underlying stroke deficits itself.  She does not appear to have any dysuria, no odor to her urine.  Staff reports that for the most part she is eating okay.  Will check a follow-up CT of the head to assess for any interval visual changes.  She is on aspirin and Eliquis for stroke prophylaxis.  She had noticeably improved alertness when amantadine was started.  She is readily alert now.  This may be related to natural recovery in terms of her level of arousal at this point.  Current plan is to discontinue the amantadine to see if that helps with her irritability and start  on  Namenda for aphasia.  August 12 -  Patient with increased restlessness at times.   Continues aphasia. Not able to identify any complaint.  Appears anxious today. No change on CT head yesterday. Started on Namenda for aphasia on 8/12/19.   August 13- will continue to monitor on recent med adjustments   August 15-She continues with expressive language deficits.    Again appears anxious related to her aphasia and difficulty expressing herself.    She will be able to get occasional single word for the examiner.  She does follow instructions.  The patient was reviewed with .  Discussed adding on an antidepressant with anxiolytic properties -Paxil-as it appears frustration from her aphasia with associated anxiety with the frustration affects her performance.  We will plan on starting Paxil 10 mg daily tomorrow and monitor response.  Reviewed with the patient's  that would not add a short acting anxiolytic (such as a benzodiazepine or Seroquel) as it may affect her cognitive performance.    August 16-started Paxil 10 mg daily  August 19 - Increased restlessness Friday night - ? Related to UTI vs initiation of Paxil. On Cefdinir for GNR pending ID &Sensitivity.  August 20 -urine culture with E. coli ESBL.  Given her aphasia not able to obtain information regarding dysuria.  As the urinalysis was ordered as part of work-up for increased restlessness this weekend, would have to treat as symptomatic although could be asymptomatic bacteriuria.  Will place on ertapenem 1 g IV daily for 3-5 days.  August 21 - increased restlessness/anxiety today. Labs without any acute change. Blood glucose okay this AM. BP pattern okay. No gross motor changes. Follow on current regimen for now.   Addendum-Patient continued today with  increased restlessness/anxiety/refusing aspects of nursing care. She had had a good day in therapies yesterday. She is calmer now with family present. Did allow IV antibiotic to be infused but has not  "taken PO meds or eaten dinner yet.   Discussed with patient's /children will need to hold on planned discharge tomorrow given her mood/behavior today.   I do not feel Namenda (started as trial for aphasia) is related as had episodes of restlessness prior to starting, but will discontinue for now so  slate with psychoactive medications. Continue Paxil for now. Will consult Psychiatry for input.  August 22-patient more cooperative today, taking medications and will participate in therapies.  Still perseverates on \"no\".  Still with some anxiety related to her aphasia but less than yesterday.  Seen by psychiatry and to continue with Paxil 10 mg daily.  Also order written for Zyprexa 5 mg IM every 8 hours as needed agitation.  To observe on current regimen.  August 26 - still gets anxious/frustrated by aphasia. Not participate with therapies or eat meal at times. Continues on Paxil. Will ask Psychiatry for any additional thoughts in AM.   August 27-patient received Zyprexa 5 mg IM at about 830 last evening.  Slept last night.  She is not anxious this morning.  Participating with activities.  Will plan to get updated assessment from psychiatry for any adjustment in regimen before discharge to subacute - per facility cannot go on a prn medication. Addendum: per Psychiatry,  recommend giving Paxil opportunity to exert its clinical effect over the next 1-1/2 to 2 weeks.    Aphasia -  August 12 - trial of Namenda  August 16-continue to observe on Namenda 5 mg daily for her aphasia and may possibly titrate up next week  August 20-titrate up on Namenda to 5 mg twice a day  August 22-discontinued Namenda.  Do not feel related to her anxiety/restlessness as it was present prior to starting but discontinued to avoid any additional psychoactive medications that might add to it.  Had not seen any improvement in her aphasia with the very brief trial.    Dysphagia-Cortrak feeding tube discontinued on July 31 and started " on puréed/honey thick liquid diet with strategies  August 7-advance to fork mash nectar thick liquid diet, consistent carbohydrate.  August 14 - repeat VFSS to assess for advancing to thin liquids  August 15-Video fluoroscopic swallow study today-mechanical soft, no mixed consistency, nectar thick liquid, water protocol between meals, no straws. May take meds whole in Puree or NTL as tolerated. May have ice chips or sips of water in between meals after oral care per protocol.    History of multiple bilateral strokes and left central retinal artery occlusion    History of left greater than right internal carotid artery stenosis-status post left internal carotid artery stent July 17, 2019  Follow with Neurosurgery in office in October with carotid ultrasound    History of hypertension -  Hyzaar 100-25. August 4 - Bystolic increased to 20 mg daily to 20 mg bid.  August 8 - BP still runs high. On discharge in May 2019 was on Hyzaar 100-25 mg daily, Bystolic 20 mg daily, amlodipine 10 mg daily, Hydralazine 50 mg q 8 hours.  Will add Hydralazine initially 10 mg po q 8 hours and titrate up as needed.   August 9-systolic blood pressure elevated last night and early this morning but better this afternoon at 122-130.  Continue to follow recent addition of hydralazine and titrate up as needed  August 10-systolic blood pressure 175 this afternoon, range otherwise 122-142.  Will titrate up on hydralazine to 20 mg every 8 hours.  August 11-hypertension overall appears improved.  Will titrate up further to 25 mg every 8 hours.  August 12 - add amlodipine 5 mg daily.   August 14 - titrate up on hydralazine to 50 mg q 8 hours  August 15-blood pressure improved this afternoon with systolic blood pressure in the 120s-follow on current regimen  August 16-blood pressure range 110////59-will continue to monitor on present regimen  August 19 - /74 early AM, later 136/71 - increase amlodipine to 5 mg bid    History  of diabetes mellitus -Lantus/glipizide (home medication metformin 1000 mg twice daily and glipizide 10 mg twice daily)  August 1-blood sugars were low yesterday afternoon after tube feeds discontinued.  Held glipizide last night and this morning and Lantus last night.  Blood sugar elevated at noontime today.  Will receive resume glipizide at a lower dose of 5 mg twice a day with meals.  Continue sliding scale insulin coverage.  She also is on metformin at home.  August 5-add back metformin initially at 500 mg twice a day and continue glipizide 5 mg twice a day, both one half of previous home dose, titrate up as needed.  August 7-titrate up metformin to 850 mg twice a day.  Add consistent carbohydrate restriction to diet.  August 9-increase metformin to 1000 g twice a day.  August 10-blood glucose 340 last evening but 150-114-178 so far today  August 11-continue to follow blood sugar pattern and may increase glipizide further as current dose glipizide 5 mg twice a day along with metformin 1000 mg twice a day  August 14 - blood glucose  past 24 hours - monitor pattern.  August 15-blood glucose [phone]--[phone]-continue to follow pattern.  Will change sliding scale insulin coverage to Humalog at a lower dose.  She was started on the regular insulin sliding scale when she was on tube feeds.  August 16 - recent blood glucose -813-138  August 19 - recent blood glucose [phone] - increase glipizide to 7.5 mg bid  August 21 - refused blood glucose check today. Will decrease glipizide back to 5 mg bid to avoid potential for hypoglycemia until allow blood glucose check.     Stroke prophylaxis-aspirin/Eliquis/atorvastatin    Anemia-August 1-hemoglobin 7.4.  Most recent check on July 23 HGB 9.3.  Will recheck hemoglobin this afternoon.  Hemoccult stool.  Iron studies.  Reticulocyte count.  Recheck CBC in the a.m.  Added Protonix.  Patient is on aspirin and Eliquis.  With recent stroke  will look to  transfuse packed red blood cell.  August 2-hemoglobin improved 9.0-1 unit packed red blood cells.  Elevated reticulocyte count in response to anemia.  Nursing describes dark stool.  Patient discussed with gastroenterology.  At this point unable to do endoscopy as on Eliquis and aspirin.  Will treat symptomatically for now with increasing proton pump inhibitor and transfusing as needed.  August 5-anemia improved-hemoglobin 9.8  August 16-hemoglobin unchanged 9.8    DVT prophylaxis-SCDs/anticoagulation    Impulsivity-   Nursing to do re-orientation with the patient.  Room close to the nursing station.    Endocrine-vitamin D deficiency-ergocalciferol 50,000 units weekly x8 weeks added. Vitamin B12 level and TSH checked in late May unremarkable    Urinary tract infection-August 20 -urine culture with E. coli ESBL.  Given her aphasia not able to obtain information regarding dysuria.  As the urinalysis was ordered as part of work-up for increased restlessness this weekend, would have to treat as symptomatic although could be asymptomatic bacteriuria.  Will place on ertapenem 1 g IV daily for 3-5 days.     Impaired cognition/impaired language, impaired swallow, impaired activity daily living, impaired mobility     Now admit for comprehensive acute inpatient rehabilitation .  This would be an interdisciplinary program with physical therapy 1 hour,  occupational therapy 1 hour, and speech therapy 1 hour, 5 days a week.  Rehabilitation nursing for carryover, monitoring of cardiopulmonary and neurologic   status, bowel and bladder, and skin  Ongoing physician follow-up.  Weekly team conferences.  Goals are indeterminant.   Rehabilitation prognosis determined.  Medical prognosis determined.  Estimated length of stay is indeterminate.    TEAM CONF - AUGUST 6- TRANFERS CTG. GAIT UP  FEET CTG-MIN ASSIST. UBD MIN. LBD MOD. BATH MOD. SEVERE APHASIA. INCREASED RESISTANCE TO PARTICIPATE AT TIMES.   PUREE/HTL.  GOOD PO INTAKE.  ADJUSTING MEDS FOR DIABETES MELLITUS.  BLADDER CONTINENT/INCONTIENT.   ELOS- 2 WEEKS.     TEAM CONF - AUGUST 13 - DID GREAT WITH PT ON Saturday, FOLLOWING COMMANDS AND BATTING A BALL WITH ANOTHER PATIENT. YESTERDAY, VERY FRUSTRATED AND IRRITABLE.  FRUSTRATED BY APHASIA, TRYING TO TELL STAFF SOMETHING. WILL HOLD ON TO OBJECTS, REPEAT SINGLE WORD.   BED CTG. 4 STAIRS CTG.  GAIT 220 FEET CTG-SBA NO DEVICE.  TOILET TRANSFERS CTG-MIN.  GROOMING MIN.  UBD MIN ASSIST FOR BRA, LBD CTG-MIN. BATH CTG-MIN. COORDINATION RUE BETTER.  DYSPHAGIA - IMPROVED - FORK MASH, NTL. DO NOT FEEL SHE WOULD TOLERATE E-STIM. RECEPTIVE LANGUAGE IMPROVED SLIGHTLY , POINTING TO OBJECTS. EXPRESSIVELY PERSEVERATIVE ON A SINGLE WORD, TRIES TO USE PICTURE BOARD TO COMMUNICATE. YES/NO NOT ACCURATE.  CONTINENT BOWEL AND BLADDER, TIMED VOIDS. SKIN INTACT. TYPICALLY GOOD INTAKE.  ELOS - 1.5 WEEKS    AUGUST 14 - In therapies - In OT, increased participation noted with  present   Transfers SBA/CTG  Walked from therapy gym to room without AD SBA and vc's for directions back to the room   300' outdoors on sidewalk, incline; 300', 180', 100' up on rehab unit. Pt was able to find her room when she got close to it  Bathing, dressing, grooming min assist. Toileting moderate assist.  Pt participated in using R hand to manipualte round pegs into peg board by color with MIN difficutly once pt caught on to the task. with 3D block recreation with R hand with 100% color match, 80% accuracy with block recreation  With SLP, patient was not able to effectively communicate her wants/needs but refused to go to therapy office by planting her heels while rolling in wc down the [place]; tx session completed in her room     August 15-The patient was reviewed with .  Discussed adding on an antidepressant with anxiolytic properties -Paxil-as it appears frustration from her aphasia with associated anxiety with the frustration affects her performance.  We will plan on  starting Paxil 10 mg daily tomorrow and monitor response.  Reviewed with the patient's  that would not add a short acting anxiolytic (such as a benzodiazepine or Seroquel) as it may affect her cognitive performance.    August 16-Patient was reviewed with her daughter today including rehabilitation goals, discharge planning, and recent medication adjustment to try to help with her anxiety/frustration with her aphasia.  Reviewed with the daughter that on discussion with the team is felt that she would do better with her functional status/aphasia/anxiety if able to be discharged to home environment with more frequent interactions but if that is not feasible with the need to look at subacute options.    TEAM CONF - AUGUST 20 - GAIT CTG- FEET. NO DEVICE, WALKS TO AND FROM GYM.  ADLS CTG WITH CUING.  PLAYED ENTIRE GAME OF CHECKERS YESTERDAY. VARIABLE PERFORMANCE WITH SPEECH THERAPY 20-90% MATCHING WORDS TO PICTURES.    MECH SOFT, GROUND MEAT, NECTAR THICK LIQUIDS.   TYPICALLY CONTINENT BLADDER BUT INCONTINENT BOWEL. DID FINE LAST EVENING PER SISTER AND DID NOT TRY TO GET UP- WILL DISCONTINUE SITTER. IN ROOM CLOSE TO NURSING STATION.   [facility] WILL WORK TOWARD SUBACUTE PLACEMENT     August 23-upper body dressing set up, lower body dressing min assist.  Grooming supervision.  Transfers standby assist.  Ambulated 220 feet without a device contact-guard standby assist.  She spent in therapy this morning but not with physical therapy this afternoon    TEAM CONF - AUGUST 27 - CONTINUES WITH APHASIA. WORKED ON MELODIC INTONATION. BED SBA. 4 STAIRS CTG. GAIT 300 FEET SBA. TOILET TRANSFERS SBA. SHOWER TRANSFERS SBA. VOIDING ON OWN. UBD MIN ASSIST. LBD CTG-MIN. BATH CTG. DIET - MECH SOFT, GROUND MEATS, NECTAR THICK LIQUIDS. NOT ABLE TO MANAGE AT HOME IN SHORT TERM. WILL ASK PSYCHIATRY SEE IN FOLLOW UP FOR ANY FINAL ADJUSTMENTS IN REGIMEN BEFORE DISCHARGE TO SUBACUTE.    .  ELOS- DISPOSITION  PENDING.        Barrier to discharge includes  Impaired cognitive-linguistic skills - work on receptive and expressive language and cognition.       Ajit Howard MD  08/27/19  7:51 AM    Time:

## 2019-08-27 NOTE — PROGRESS NOTES
Inpatient Rehabilitation Plan of Care Note    Plan of Care  Care Plan Reviewed - Updates as Follows    Psychosocial    Performed Intervention(s)  Assist patient to express needs and concerns  calm enviroment      Safety    Performed Intervention(s)  Bed alarm, wc alarm  Safety rounds  Items within reach      Body Systems    Performed Intervention(s)  Daily skin inspection      Sphincter Control    Performed Intervention(s)  Monitor intake and output  Encourage fluid intake  Elimination schedule  contact isolation    Signed by: Trupti Viramontes RN

## 2019-08-27 NOTE — PROGRESS NOTES
Inpatient Rehabilitation Plan of Care Note    Plan of Care  Care Plan Reviewed - No updates at this time.    Psychosocial    Performed Intervention(s)  Assist patient to express needs and concerns  calm enviroment      Safety    Performed Intervention(s)  Bed alarm, wc alarm  Safety rounds  Items within reach  24 hrs sitter      Body Systems    Performed Intervention(s)  Daily skin inspection      Sphincter Control    Performed Intervention(s)  Monitor intake and output  Encourage fluid intake  Elimination schedule  contact isolation    Signed by: Eda Allen RN

## 2019-08-27 NOTE — PROGRESS NOTES
[facility] PROGRESS NOTE    PATIENT IDENTIFICATION:   Name:  Darby Workman      MRN:  [id number]     74 y.o.  female               CC: PO carotid endarterectomy      Subjective     Interval History: has no complaint of pain or HA.    Objective     Vital signs in last 24 hours:  Temp:  [96.4 °F (35.8 °C)-97.9 °F (36.6 °C)] 97.9 °F (36.6 °C)  Heart Rate:  [64-72] 66  Resp:  [16-20] 16  BP: (112-165)/(60-78) 112/60  ICP ranges-    Intake/Output last 3 shifts:  I/O last 3 completed shifts:  In: 740 [P.O.:740]  Out: 2025 [Urine:2025]    Intake/Output this shift:  I/O this shift:  In: 360 [P.O.:360]  Out: -       Physical Exam:  General:   Lethargic, awake  Chronically ill-appearing  Not answering questions  Expressive aphasia   Moving all extremities purposefully        LABS:    Lab Results (last 24 hours)     Procedure Component Value Units Date/Time    POC Glucose Once [[id number]]  (Normal) Collected:  08/27/19 0726    Specimen:  Blood Updated:  08/27/19 0727     Glucose 125 mg/dL     POC Glucose Once [[id number]]  (Abnormal) Collected:  08/27/19 1113    Specimen:  Blood Updated:  08/27/19 1117     Glucose 138 mg/dL         Results from last 7 days   Lab Units 08/26/19  0658   SODIUM mmol/L 141   POTASSIUM mmol/L 3.6   CHLORIDE mmol/L 104   CO2 mmol/L 27.0   BUN mg/dL 20   CREATININE mg/dL 0.72   GLUCOSE mg/dL 142*   CALCIUM mg/dL 8.5*     Results from last 7 days   Lab Units 08/26/19  0658   WBC 10*3/mm3 5.67   HEMOGLOBIN g/dL 9.7*   HEMATOCRIT % 31.1*   PLATELETS 10*3/mm3 270       IMAGING STUDIES:    No new imaging    Meds reviewed/changed: Yes    Current Facility-Administered Medications   Medication Dose Route Frequency Provider Last Rate Last Dose   • acetaminophen (TYLENOL) tablet 1,000 mg  1,000 mg Oral Q6H PRN Mary Patel MD   1,000 mg at 08/26/19 1941   • aluminum-magnesium hydroxide-simethicone (MAALOX MAX) 400-400-40 MG/5ML suspension 15 mL  15 mL Oral Q6H PRN  Viktor Lopez MD   15 mL at 08/04/19 1745   • amLODIPine (NORVASC) tablet 5 mg  5 mg Oral BID - RT Ajit Howard MD   5 mg at 08/27/19 0916   • apixaban (ELIQUIS) tablet 5 mg  5 mg Oral Q12H Ajit Howard MD   5 mg at 08/27/19 0916   • aspirin tablet 325 mg  325 mg Oral Daily Ajit Howard MD   325 mg at 08/27/19 0916   • atorvastatin (LIPITOR) tablet 80 mg  80 mg Oral Nightly Ajit Howard MD   80 mg at 08/26/19 2351   • brimonidine (ALPHAGAN) 0.2 % ophthalmic solution 1 drop  1 drop Both Eyes BID Ajit Howard MD   1 drop at 08/27/19 0916   • dextrose (D50W) 25 g/ 50mL Intravenous Solution 25 g  25 g Intravenous Q15 Min PRN Ajit Howard MD       • dextrose (GLUTOSE) oral gel 15 g  15 g Oral Q15 Min PRN Ajit Howard MD   15 g at 08/20/19 2059   • dorzolamide (TRUSOPT) 2 % ophthalmic solution 1 drop  1 drop Both Eyes BID Ajit Howard MD   1 drop at 08/27/19 0916   • glipiZIDE (GLUCOTROL) tablet 5 mg  5 mg Oral BID AC Ajit Howard MD   5 mg at 08/27/19 0916   • glucagon (human recombinant) (GLUCAGEN DIAGNOSTIC) injection 1 mg  1 mg Subcutaneous PRN Ajit Howard MD       • hydrALAZINE (APRESOLINE) tablet 50 mg  50 mg Oral Q8H Ajit Howard MD   50 mg at 08/27/19 1343   • insulin lispro (humaLOG) injection 0-7 Units  0-7 Units Subcutaneous 4x Daily With Meals & Nightly Ajit Howard MD   2 Units at 08/23/19 1159   • lansoprazole (FIRST) oral suspension 30 mg  30 mg Oral BID AC Ajit Howard MD   30 mg at 08/27/19 0621   • losartan (COZAAR) 100 mg, hydrochlorothiazide (MICROZIDE) 12.5 mg for HYZAAR 100-12.5   Oral Daily Ajit Howard MD       • metFORMIN (GLUCOPHAGE) tablet 1,000 mg  1,000 mg Oral BID With Meals Ajit Howard MD   1,000 mg at 08/27/19 0916   • nebivolol (BYSTOLIC) tablet 20 mg  20 mg Oral BID Viktor Lopez MD   20 mg at 08/27/19 0916   •  "nitroglycerin (NITROSTAT) SL tablet 0.4 mg  0.4 mg Sublingual Q5 Min PRN Ajit Howard MD       • nystatin (MYCOSTATIN) [id number] UNIT/ML suspension 500,000 Units  5 mL Swish & Spit 4x Daily Ajit Howard MD   500,000 Units at 08/27/19 1119   • OLANZapine (zyPREXA) injection 5 mg  5 mg Intramuscular Q8H PRN Jaylen Heaton III, MD   5 mg at 08/26/19 2014   • PARoxetine (PAXIL) tablet 10 mg  10 mg Oral Daily Ajit Howard MD   10 mg at 08/27/19 0916   • potassium chloride (KLOR-CON) packet 20 mEq  20 mEq Oral Daily With Breakfast Aijt Howard MD   20 mEq at 08/27/19 0916   • vitamin D (ERGOCALCIFEROL) capsule 50,000 Units  50,000 Units Oral Q7 Days Ajit Howard MD   50,000 Units at 08/22/19 1153       Assessment/Plan     ASSESSMENT:      Stroke (cerebrum) (CMS/HCC)      PLAN: Clinically she remains stable following her carotid endarterectomy surgery on July 17.  Per nursing staff, she has had aggressive behavior and required Zovirax yesterday evening.  She is pending evaluation by psych.  Hopefully will be discharge to nursing home soon.  She is to follow-up as previously scheduled with our office in October with carotid ultrasound. We will sign off, please call if needed.     I discussed the patients findings and my recommendations with patient, nursing staff and Dr Harris       LOS: 27 days       Nancy Ireland, APRN  8/27/2019  2:38 PM      \"Dictated utilizing Dragon dictation\".      "

## 2019-08-27 NOTE — PLAN OF CARE
Problem: Patient Care Overview  Goal: Plan of Care Review  Outcome: Ongoing (interventions implemented as appropriate)   08/27/19 0833   Patient Care Overview   IRF Plan of Care Review progress ongoing, continue   Coping/Psychosocial   Plan of Care Reviewed With patient   OTHER   Outcome Summary pt confused d/t aphasia. nods appropriately to some questions not all. no zyprexa needed this shift. pt cooperative with nursing, has been uncooperative with therapies. Will continue to monitor.

## 2019-08-27 NOTE — PROGRESS NOTES
Spoke with pt's  earlier today to make him aware that discharge is on hold.    Discussed with [facility] liaison, Laura, who states the facility cannot accept a pt on PRN psychiatric medication, but can accept if the medication is scheduled.   Await input from Dr Greenwood.

## 2019-08-27 NOTE — PROGRESS NOTES
Inpatient Rehabilitation Functional Measures Assessment    Functional Measures  KATI Eating:  Branch  [facility] Grooming: Branch  [facility] Bathing:  Branch  [facility] Upper Body Dressing:  Branch  [facility] Lower Body Dressing:  [facility] Toileting:  [facility] Bladder Management  Level of Assistance:  Branch  Frequency/Number of Accidents this Shift:  [facility] Bowel Management  Level of Assistance: [place]  Frequency/Number of Accidents this Shift: [facility] Bed/Chair/Wheelchair Transfer:  Bed/chair/wheelchair Transfer did not occur  because patient refused. .  [facility] Toilet Transfer:  [facility] Tub/Shower Transfer:  [place]    Previously Documented Mode of Locomotion at Discharge: Field  KATI Expected Mode of Locomotion at Discharge: [facility] Walk/Wheelchair:  WHEELCHAIR OBSERVATION   Wheelchair did not occur because patient refused.    WALK OBSERVATION   Walking did not occur because patient refused.  KATI Stairs:  Stairs did not occur because patient refused.    KATI Comprehension:  [facility] Expression:  [facility] Social Interaction:  [facility] Problem Solving:  [facility] Memory:  [place]    Therapy Mode Minutes  Occupational Therapy: Branch  Physical Therapy: Individual: 0 minutes.  Speech Language Pathology:  Branch    Signed by: Miar Chadwick, PT

## 2019-08-27 NOTE — PROGRESS NOTES
Inpatient Rehabilitation Functional Measures Assessment    Functional Measures  KATI Eating:  Eating Score = 5. Patient is supervision/set-up for eating,  requiring: Verbal cuing, prompting, or instructing. No assistive devices were  required.  [facility] Grooming: [facility] Bathing:  [facility] Upper Body Dressing:  [facility] Lower Body Dressing:  [facility] Toileting:  [facility] Bladder Management  Level of Assistance:  [place]  Frequency/Number of Accidents this Shift:  [facility] Bowel Management  Level of Assistance: [place]  Frequency/Number of Accidents this Shift: [facility] Bed/Chair/Wheelchair Transfer:  [facility] Toilet Transfer:  [facility] Tub/Shower Transfer:  [place]    Previously Documented Mode of Locomotion at Discharge: Field  KATI Expected Mode of Locomotion at Discharge: [facility] Walk/Wheelchair:  [facility] Stairs:  [facility] Comprehension:  Auditory comprehension is the usual mode. Patient does not  comprehend complex/abstract information in their primary language without  assistance from a helper. Comprehension Score = 3, Moderate Prompting. Patient  comprehends basic daily needs or ideas 50-74% of the time. Patient requires  moderate/some prompting. No assistive devices were required.  KATI Expression:  Vocal expression is the usual mode. Patient does not express  complex/abstract information in their primary language without a helper.  Expression Score = 2, Maximal Prompting. Patient expresses basic daily needs  25-49% of the time. Patient uses only single words or gestures and requires  maximal/a lot of prompting (most of the time). No assistive devices were  required.  KATI Social Interaction:  Social Interaction Score = 3, Moderate Direction.  Patient interacts appropriately 50-74% of the time.  Patient requires  moderate/some direction for the following behavior(s):  KATI Problem Solving:  Activity was not observed. Unable to assess due to  severity of aphasia.  KATI Memory:   Activity was not observed. Unable to assess due to severity of  aphasia.    Therapy Mode Minutes  Occupational Therapy: Branch  Physical Therapy: Branch  Speech Language Pathology:  Individual: 60 minutes.    Signed by: Lela Bueno, WHIT

## 2019-08-27 NOTE — THERAPY TREATMENT NOTE
Inpatient Rehabilitation - Speech Language Pathology Treatment Note    [facility]       Patient Name: Darby Workman  : 1945  MRN: [id number]    Today's Date: 2019           Admit Date: 2019      Visit Dx:      No diagnosis found.    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)   • Right-sided headache   • Acute ischemic stroke (CMS/HCC)   • Embolic stroke (CMS/HCC)   • Cerebral infarction due to stenosis of left carotid artery (CMS/HCC)   • Anticoagulated by anticoagulation treatment   • Stroke (cerebrum) (CMS/HCC)          Therapy Treatment    Evaluation/Coping    Evaluation/Treatment Time and Intent  Subjective Information: no complaints (19 1230 : Lela Bueno, SLP)  Existing Precautions/Restrictions: fall (19 1230 : Lela Bueno, SLP)  Document Type: therapy note (daily note) (19 1230 : Lela Bueno, SLP)  Mode of Treatment: speech-language pathology (19 1230 : Lela Bueno, SLP)  Patient/Family Observations: Pt seen bedside. She participated well in session. (19 1230 : Lela Bueno, SLP)  Start Time (Evaluation/Treatment): 1230 (19 1230 : Lela Bueno, SLP)  Stop Time (Evaluation/Treatment): 1300 (19 1230 : Lela Bueno, SLP)    Vitals/Pain/Safety    Pain Scale: Numbers Pre/Post-Treatment  Pain Scale: Numbers, Pretreatment: 0/10 - no pain (19 1230 : Lela Bueno, SLP)           EDUCATION    The patient has been educated in the following areas:     Communication Impairment.    SLP Recommendation and Plan  Continue communication and swallow therapy.                                                      SLP GOALS     Row Name 19 1230 19 1100 19 1100       Oral Nutrition/Hydration Goal 1 (SLP)    Barriers (Oral Nutrition/Hydration Goal 1, SLP)  Pt exhibited no s/s  "of aspiration or penetration in 5/5 trials of water via cup. Plan to observe again with meal tomorrow prior to upgrading diet.  -AL  Pt observed with breakfast meal of mechanical soft/no mixed and thin liquid (water). She refused NTL this AM. No overt s/s of aspiration or penetration observed with any consistency. Minimal right labial residue observed, requiring cues to clear. No oral residue observed. Plan to observe again with meal of mechanical soft/ no mixed with thins prior to upgrading.  -AL  --    Progress/Outcomes (Oral Nutrition/Hydration Goal 1, SLP)  good progress toward goal  -AL  good progress toward goal  -AL  --       Comprehend Questions Goal 1 (SLP)    Progress/Outcomes (Comprehend Questions Goal 1, SLP)  good progress toward goal  -AL  --  --    Comment (Comprehend Questions Goal 1, SLP)  Personal yes/no questions: 100% with NO cues. Met x1.  -AL  --  --       Word Retrieval Skills Goal 1 (SLP)    Progress/Outcomes (Word Retrieval Goal 1, SLP)  good progress toward goal  -AL  --  --    Comment (Word Retrieval Goal 1, SLP)  Pt stated her first and last name and her 's name with MAX cues. She approximated her daughter's name with MAX cues. Using melodic intonation technique, pt produced 1/5 funtional phrases with MIN cues and 5/5 with MAX cues. Confrontation naming of basic objects: 20% with NO cues, 40% with MOD cues, 100% with MAX cues; apraxia impacted task. Pt counted 1-10 with 20% accuracy with MIN cues, 80% with MAX cues. She stated RAJAT with 100% accuracy with MAX cues.  -AL  Pt was unable to state functions of objects despite MAX cues; however, appropriate attempt observed x1.  She sang Amazing Angie in unison with clinician with ~50% accuracy when cued to watch clinician's mouth.  -AL  Pt produced ~50% of \"Our Father\" in unison with clinician (pt brought rosatrell to therapy). She was able to sing \"Amazing Angie\" in unison with clinician with 75% accuracy with MAX cues; benefits from cues " to look at SLP face while singing. She produced 3 functional phrases and her name with melodic intonation technique and MAX cues. In supported communication, she communicated her number of kids with MOD-MAX cues (benefited from written choices).  -AL       Graphic Expression of Words Goal 1 (SLP)    Progress/Outcomes (Graphic Expression of Single Words Goal 1, SLP)  --  good progress toward goal  -AL  --    Comment (Graphic Expression of Single Words Goal 1, SLP)  --  Pt copied her full name with NO cues. She was unable to copy her 's name. Appeared to attempt to write her daughter's name, but could not be cued to accuracy.  -AL  --      User Key  (r) = Recorded By, (t) = Taken By, (c) = Cosigned By    Initials Name Provider Type    Lela Holley SLP Speech and Language Pathologist                  Time Calculation:       Time Calculation- SLP     Row Name 08/27/19 1514 08/27/19 1202          Time Calculation- SLP    SLP Start Time  1230  -AL  0845  -AL     SLP Stop Time  1300  -AL  0915  -AL     SLP Time Calculation (min)  30 min  -AL  30 min  -AL       User Key  (r) = Recorded By, (t) = Taken By, (c) = Cosigned By    Initials Name Provider Type    Lela Holley SLP Speech and Language Pathologist            Therapy Charges for Today     Code Description Service Date Service Provider Modifiers Qty    81509079854  ST TREATMENT SPEECH 2 8/26/2019 Lela Bueno SLP GN 1    10131358957 HC ST TREATMENT SWALLOW 1 8/27/2019 Lela Bueno SLP GN 1    86023684872 HC ST TREATMENT SPEECH 1 8/27/2019 Lela Bueno SLP GN 1    79229203359 HC ST TREATMENT SPEECH 1 8/27/2019 Lela Bueno SLP GN 2                           WHIT Tavarez  8/27/2019

## 2019-08-27 NOTE — PROGRESS NOTES
Nutrition Services    Patient Name:  Darby Workman  YOB: 1945  MRN: [id number]  Admit Date:  7/31/2019    Follow-up    Diet: DYS Mech soft, NTL, consistent carb  Supplements: magic cup shakes  Intake: varies, % - depends on her level of agitation  Labs: reviewed  Meds: reviewed  Skin status: fragile, intact    Comments:   Comes to DR for meals. Sometimes refuses to eat. Remains aphasic.     Plan: continue same. [facility] vs transfer to [facility] as discussed in rounds.     Electronically signed by:  Renate Clifford RD  08/27/19 12:25 PM

## 2019-08-27 NOTE — PROGRESS NOTES
Case Management  Inpatient Rehabilitation Team Conference    Conference Date/Time: 8/27/2019 7:49:11 AM    Team Conference Attendees:  Dr. Ajit Leonardo, Blanca Hurtado, Pharmacist  Sheryl Lacy, ESAU Chadwick, PT  Ingris Ansari, OT  Renate Clifford RD, LD  Star Capps, RN   Lela Bueno, SLP  Char Carvajal, CTRS  Trupti Viramontes RN    Demographics            Age: 74Y            Gender: Female    Admission Date: 7/31/2019 3:45:00 PM  Rehabilitation Diagnosis:  CVA right neftali  Past Medical History: Vision loss; carotid stenosis, CAD, MI, blood clots, HLD,  HTN; OA; nocturia; CVA x2 5/19; DM      Plan of Care  Anticipated Discharge Date/Estimated Length of Stay: ELOS: Pending SNU  Anticipated Discharge Destination: Community discharge with assistance  Discharge Plan : A bed is available [facility] pt at [facility] and she may  transfer to this facility when medically ready.  Medical Necessity Expected Level Rationale: Estimated level of assist after  discharge: MIN/CGA  Rehab prognosis: indeterminate  Medical prognosis: indeterminate  Intensity and Duration: an average of 3 hours/5 days per week  Medical Supervision and 24 Hour Rehab Nursing: x  Physical Therapy: x  PT Intensity/Duration: 60 minutes/day, 5 days/week for 28 days  Occupational Therapy: x  OT Intensity/Duration: 60 minutes/day, 5 days/week for 28 days  Speech and Language Therapy: x  SLP Intensity/Duration: 60 minutes/day, 5 days/week for 28 days  Social Work: x  Therapeutic Recreation: x  Psychology: x  Updated (if changes indicated)    Anticipated Discharge Date/Estimated Length of Stay:   ELOS: Pending placement    Based on the patient's medical and functional status, their prognosis and  expected level of functional improvement is: Estimated level of assist after  discharge: MIN/CGA  Rehab prognosis: indeterminate  Medical prognosis: indeterminate      Interdisciplinary Problem/Goals/Status    All Rehab Problems:  Body  Systems    [RN] Integumentary(Active)  Current Status(08/20/2019): Incision L neck glued. Skin is intact.  Weekly Goal(08/27/2019): No S&S infection noted  Discharge Goal: No S&S infection noted Skin is intact.        Communication    [ST] Comprehension(Active)  Current Status(08/26/2019): mod-severely impaired; improved ability to point to  named objects and body parts, still cannot follow verbal commands consistently  or answer yes/no questions reliably  Weekly Goal(08/26/2019): follow 1-step commands with a model  Discharge Goal: improved comprehension    [ST] Expression(Active)  Current Status(08/19/2019): Severely impaired. Stimulable for use of melodic  intonation to produce phrases on 8/26/19.  Weekly Goal(08/30/2019): Produce functional phrases for ADL tasks  Discharge Goal: functional expression for basic wants/needs        Mobility    [OT] Toilet Transfers(Active)  Current Status(08/26/2019): SBA  Weekly Goal(08/27/2019): SBA  Discharge Goal: SBA    [OT] Tub/Shower Transfers(Active)  Current Status(08/26/2019): SBA  Weekly Goal(08/27/2019): SBA  Discharge Goal: SBA    [PT] Bed/Chair/Wheelchair(Active)  Current Status(08/26/2019): SBA  Weekly Goal(08/30/2019): SBA  Discharge Goal: SBA    [PT] Walk(Active)  Current Status(08/26/2019): 300` SBA  Weekly Goal(08/30/2019): to and from BR, SBA  Discharge Goal: 300` SBA    [PT] Stairs(Active)  Current Status(08/26/2019): 4 steps CGA  Weekly Goal(08/30/2019): PT only  Discharge Goal: 4 steps CGA        Psychosocial    [RN] Coping/Adjustment(Active)  Current Status(08/20/2019): Pt. is non-verbal; Supportive   Weekly Goal(08/27/2019): Identify progress in functional status  Discharge Goal: Demonstrate healthy coping strategies        Safety    [RN] Potential for Injury(Active)  Current Status(08/20/2019): Impulsive; aphasic; does not use call light.  Irritable/ combative/ agitated at times.  Weekly Goal(08/27/2019): Cue to use call light  Discharge Goal:  Pt/family will be aware of risk of fall and safety in the home  setting        Self Care    [OT] Bathing(Active)  Current Status(08/26/2019): CGA, vc's for throughness  Weekly Goal(08/27/2019): CGA  Discharge Goal: CGA    [OT] Dressing (Lower)(Active)  Current Status(08/26/2019): CGA/MIN  Weekly Goal(08/27/2019): CGA/MIN  Discharge Goal: CGA/MIN    [OT] Dressing (Upper)(Active)  Current Status(08/26/2019): MIN with bra fastening  Weekly Goal(08/27/2019): MIN  Discharge Goal: MIN    [OT] Grooming(Active)  Current Status(08/26/2019): supervision/set up  Weekly Goal(08/27/2019): supervision, set up  Discharge Goal: supervision, set up    [OT] Toileting(Active)  Current Status(08/26/2019): MIN  Weekly Goal(08/27/2019): MIN  Discharge Goal: MIN        Sphincter Control    [RN] Bladder Management(Active)  Current Status(08/21/2019): incontinent bladder 25%. Urine positive for ESBL  Weekly Goal(08/28/2019): continent 75%  Discharge Goal: continent 100%    [RN] Bowel Management(Active)  Current Status(08/21/2019): incontinent bowel 100%  Weekly Goal(08/28/2019): continent bowel 50%  Discharge Goal: continent bowel 100%        Swallow Function    [ST] Swallowing(Active)  Current Status(08/26/2019): VFSS 8/15: mech. soft, no mixed, ground meat, NTL;  medications whole as tolerated with puree/NTL; water/ice between meals after  oral care  Weekly Goal(08/26/2019): tolerate current diet without s/s pen/asp  Discharge Goal: tolerate least restrictive diet without s/s pen/asp        Comments: 8/6: walker confuses patient so PT not using one at this time;  decreased coordination right hand; some agitation at times; some unsafe  behaviors;    8/13: increased frustration/irritation; balance improved; increased coordination  in right hand; unlikely to tolerate e-stim; impulsive;    8/20: now with sitter, plan to discontinue; plan discharge to subacute; balance  improved; performance with activities inconsistent;    8/27: agitated last  night, not appropriate to discharge to SNU at this time;    Signed by: Star Capps RN    Physician CoSigned By: Ajit Howard 08/27/2019 08:34:51

## 2019-08-28 LAB
ALBUMIN SERPL-MCNC: 3.3 G/DL (ref 3.5–5.2)
ALP SERPL-CCNC: 88 U/L (ref 39–117)
ALT SERPL W P-5'-P-CCNC: 13 U/L (ref 1–33)
ANION GAP SERPL CALCULATED.3IONS-SCNC: 9.1 MMOL/L (ref 5–15)
AST SERPL-CCNC: 19 U/L (ref 1–32)
BASOPHILS # BLD AUTO: 0.04 10*3/MM3 (ref 0–0.2)
BASOPHILS NFR BLD AUTO: 0.7 % (ref 0–1.5)
BILIRUB CONJ SERPL-MCNC: <0.2 MG/DL (ref 0.2–0.3)
BILIRUB INDIRECT SERPL-MCNC: ABNORMAL MG/DL
BILIRUB SERPL-MCNC: 0.3 MG/DL (ref 0.2–1.2)
BUN BLD-MCNC: 20 MG/DL (ref 8–23)
BUN/CREAT SERPL: 23.5 (ref 7–25)
CALCIUM SPEC-SCNC: 9.1 MG/DL (ref 8.6–10.5)
CHLORIDE SERPL-SCNC: 102 MMOL/L (ref 98–107)
CHOLEST SERPL-MCNC: 95 MG/DL (ref 0–200)
CO2 SERPL-SCNC: 27.9 MMOL/L (ref 22–29)
CREAT BLD-MCNC: 0.85 MG/DL (ref 0.57–1)
DEPRECATED RDW RBC AUTO: 51.4 FL (ref 37–54)
EOSINOPHIL # BLD AUTO: 0.12 10*3/MM3 (ref 0–0.4)
EOSINOPHIL NFR BLD AUTO: 2.2 % (ref 0.3–6.2)
ERYTHROCYTE [DISTWIDTH] IN BLOOD BY AUTOMATED COUNT: 14.7 % (ref 12.3–15.4)
GFR SERPL CREATININE-BSD FRML MDRD: 65 ML/MIN/1.73
GLUCOSE BLD-MCNC: 76 MG/DL (ref 65–99)
GLUCOSE BLDC GLUCOMTR-MCNC: 208 MG/DL (ref 70–130)
GLUCOSE BLDC GLUCOMTR-MCNC: 92 MG/DL (ref 70–130)
HCT VFR BLD AUTO: 30.5 % (ref 34–46.6)
HDLC SERPL-MCNC: 40 MG/DL (ref 40–60)
HGB BLD-MCNC: 9.7 G/DL (ref 12–15.9)
IMM GRANULOCYTES # BLD AUTO: 0.02 10*3/MM3 (ref 0–0.05)
IMM GRANULOCYTES NFR BLD AUTO: 0.4 % (ref 0–0.5)
LDLC SERPL CALC-MCNC: 42 MG/DL (ref 0–100)
LDLC/HDLC SERPL: 1.05 {RATIO}
LYMPHOCYTES # BLD AUTO: 1.16 10*3/MM3 (ref 0.7–3.1)
LYMPHOCYTES NFR BLD AUTO: 20.8 % (ref 19.6–45.3)
MCH RBC QN AUTO: 29.8 PG (ref 26.6–33)
MCHC RBC AUTO-ENTMCNC: 31.8 G/DL (ref 31.5–35.7)
MCV RBC AUTO: 93.8 FL (ref 79–97)
MONOCYTES # BLD AUTO: 0.51 10*3/MM3 (ref 0.1–0.9)
MONOCYTES NFR BLD AUTO: 9.2 % (ref 5–12)
NEUTROPHILS # BLD AUTO: 3.72 10*3/MM3 (ref 1.7–7)
NEUTROPHILS NFR BLD AUTO: 66.7 % (ref 42.7–76)
NRBC BLD AUTO-RTO: 0 /100 WBC (ref 0–0.2)
PLATELET # BLD AUTO: 254 10*3/MM3 (ref 140–450)
PMV BLD AUTO: 9.5 FL (ref 6–12)
POTASSIUM BLD-SCNC: 3.7 MMOL/L (ref 3.5–5.2)
PROT SERPL-MCNC: 5.4 G/DL (ref 6–8.5)
RBC # BLD AUTO: 3.25 10*6/MM3 (ref 3.77–5.28)
SODIUM BLD-SCNC: 139 MMOL/L (ref 136–145)
TRIGL SERPL-MCNC: 65 MG/DL (ref 0–150)
VLDLC SERPL-MCNC: 13 MG/DL (ref 5–40)
WBC NRBC COR # BLD: 5.57 10*3/MM3 (ref 3.4–10.8)

## 2019-08-28 PROCEDURE — 80076 HEPATIC FUNCTION PANEL: CPT | Performed by: PHYSICAL MEDICINE & REHABILITATION

## 2019-08-28 PROCEDURE — 84439 ASSAY OF FREE THYROXINE: CPT | Performed by: SPECIALIST

## 2019-08-28 PROCEDURE — 97535 SELF CARE MNGMENT TRAINING: CPT

## 2019-08-28 PROCEDURE — 92507 TX SP LANG VOICE COMM INDIV: CPT

## 2019-08-28 PROCEDURE — 85025 COMPLETE CBC W/AUTO DIFF WBC: CPT | Performed by: PHYSICAL MEDICINE & REHABILITATION

## 2019-08-28 PROCEDURE — 63710000001 INSULIN LISPRO (HUMAN) PER 5 UNITS: Performed by: PHYSICAL MEDICINE & REHABILITATION

## 2019-08-28 PROCEDURE — 84443 ASSAY THYROID STIM HORMONE: CPT | Performed by: SPECIALIST

## 2019-08-28 PROCEDURE — 80048 BASIC METABOLIC PNL TOTAL CA: CPT | Performed by: PHYSICAL MEDICINE & REHABILITATION

## 2019-08-28 PROCEDURE — 82962 GLUCOSE BLOOD TEST: CPT

## 2019-08-28 PROCEDURE — 92526 ORAL FUNCTION THERAPY: CPT

## 2019-08-28 PROCEDURE — 80061 LIPID PANEL: CPT | Performed by: PHYSICAL MEDICINE & REHABILITATION

## 2019-08-28 RX ORDER — GLIPIZIDE 5 MG/1
5 TABLET ORAL
Start: 2019-08-28 | End: 2020-04-20 | Stop reason: HOSPADM

## 2019-08-28 RX ORDER — ACETAMINOPHEN 500 MG
1000 TABLET ORAL EVERY 6 HOURS PRN
Start: 2019-08-28 | End: 2019-09-13 | Stop reason: HOSPADM

## 2019-08-28 RX ORDER — UREA 10 %
3 LOTION (ML) TOPICAL NIGHTLY
Status: DISCONTINUED | OUTPATIENT
Start: 2019-08-28 | End: 2019-08-29 | Stop reason: HOSPADM

## 2019-08-28 RX ORDER — PANTOPRAZOLE SODIUM 40 MG/1
40 TABLET, DELAYED RELEASE ORAL
Status: DISCONTINUED | OUTPATIENT
Start: 2019-08-28 | End: 2019-08-29 | Stop reason: HOSPADM

## 2019-08-28 RX ORDER — NICOTINE POLACRILEX 4 MG
15 LOZENGE BUCCAL
Start: 2019-08-28 | End: 2019-09-13 | Stop reason: HOSPADM

## 2019-08-28 RX ORDER — ASPIRIN 325 MG
325 TABLET ORAL DAILY
Start: 2019-08-28 | End: 2020-04-20 | Stop reason: HOSPADM

## 2019-08-28 RX ORDER — PAROXETINE 10 MG/1
10 TABLET, FILM COATED ORAL DAILY
Start: 2019-08-29 | End: 2019-09-13 | Stop reason: HOSPADM

## 2019-08-28 RX ORDER — ATORVASTATIN CALCIUM 80 MG/1
80 TABLET, FILM COATED ORAL NIGHTLY
Start: 2019-08-28 | End: 2022-10-18

## 2019-08-28 RX ORDER — POTASSIUM CHLORIDE 1.5 G/1.77G
20 POWDER, FOR SOLUTION ORAL
Start: 2019-08-29 | End: 2020-04-20 | Stop reason: HOSPADM

## 2019-08-28 RX ORDER — NEBIVOLOL 20 MG/1
20 TABLET ORAL 2 TIMES DAILY
Start: 2019-08-28 | End: 2022-10-18

## 2019-08-28 RX ORDER — ERGOCALCIFEROL 1.25 MG/1
50000 CAPSULE ORAL
Qty: 4 CAPSULE | Refills: 0
Start: 2019-08-28 | End: 2019-08-29 | Stop reason: HOSPADM

## 2019-08-28 RX ORDER — ALUMINA, MAGNESIA, AND SIMETHICONE 2400; 2400; 240 MG/30ML; MG/30ML; MG/30ML
15 SUSPENSION ORAL EVERY 6 HOURS PRN
Start: 2019-08-28 | End: 2020-12-01

## 2019-08-28 RX ORDER — DEXTROSE MONOHYDRATE 25 G/50ML
25 INJECTION, SOLUTION INTRAVENOUS
Start: 2019-08-28 | End: 2019-09-13 | Stop reason: HOSPADM

## 2019-08-28 RX ORDER — AMLODIPINE BESYLATE 5 MG/1
5 TABLET ORAL
Start: 2019-08-28 | End: 2020-04-20 | Stop reason: HOSPADM

## 2019-08-28 RX ORDER — NITROGLYCERIN 0.4 MG/1
0.4 TABLET SUBLINGUAL
Refills: 12
Start: 2019-08-28 | End: 2022-10-18

## 2019-08-28 RX ORDER — HYDRALAZINE HYDROCHLORIDE 50 MG/1
50 TABLET, FILM COATED ORAL EVERY 8 HOURS SCHEDULED
Start: 2019-08-28 | End: 2020-04-20 | Stop reason: HOSPADM

## 2019-08-28 RX ORDER — LANOLIN ALCOHOL/MO/W.PET/CERES
3 CREAM (GRAM) TOPICAL NIGHTLY
Start: 2019-08-28 | End: 2022-10-18

## 2019-08-28 RX ORDER — PANTOPRAZOLE SODIUM 40 MG/1
40 TABLET, DELAYED RELEASE ORAL
Start: 2019-08-28 | End: 2020-04-20 | Stop reason: HOSPADM

## 2019-08-28 RX ADMIN — NEBIVOLOL HYDROCHLORIDE 20 MG: 10 TABLET ORAL at 08:35

## 2019-08-28 RX ADMIN — POTASSIUM CHLORIDE 20 MEQ: 1.5 POWDER, FOR SOLUTION ORAL at 08:37

## 2019-08-28 RX ADMIN — NYSTATIN 500000 UNITS: 100000 SUSPENSION ORAL at 11:50

## 2019-08-28 RX ADMIN — METFORMIN HYDROCHLORIDE 1000 MG: 1000 TABLET ORAL at 08:34

## 2019-08-28 RX ADMIN — AMLODIPINE BESYLATE 5 MG: 5 TABLET ORAL at 08:34

## 2019-08-28 RX ADMIN — LANSOPRAZOLE 30 MG: KIT at 07:34

## 2019-08-28 RX ADMIN — NYSTATIN 500000 UNITS: 100000 SUSPENSION ORAL at 08:34

## 2019-08-28 RX ADMIN — PANTOPRAZOLE SODIUM 40 MG: 40 TABLET, DELAYED RELEASE ORAL at 17:09

## 2019-08-28 RX ADMIN — ASPIRIN 325 MG: 325 TABLET ORAL at 11:48

## 2019-08-28 RX ADMIN — HYDRALAZINE HYDROCHLORIDE 50 MG: 50 TABLET, FILM COATED ORAL at 07:34

## 2019-08-28 RX ADMIN — METFORMIN HYDROCHLORIDE 1000 MG: 1000 TABLET ORAL at 17:07

## 2019-08-28 RX ADMIN — PAROXETINE HYDROCHLORIDE 10 MG: 10 TABLET, FILM COATED ORAL at 08:34

## 2019-08-28 RX ADMIN — APIXABAN 5 MG: 5 TABLET, FILM COATED ORAL at 08:34

## 2019-08-28 RX ADMIN — NYSTATIN 500000 UNITS: 100000 SUSPENSION ORAL at 17:07

## 2019-08-28 RX ADMIN — LOSARTAN POTASSIUM: 50 TABLET, FILM COATED ORAL at 08:34

## 2019-08-28 RX ADMIN — GLIPIZIDE 5 MG: 5 TABLET ORAL at 07:34

## 2019-08-28 RX ADMIN — GLIPIZIDE 5 MG: 5 TABLET ORAL at 17:07

## 2019-08-28 NOTE — THERAPY TREATMENT NOTE
Inpatient Rehabilitation - Occupational Therapy Treatment Note    [facility]     Patient Name: Darby Workman  : 1945  MRN: [id number]    Today's Date: 2019                 Admit Date: 2019      Visit Dx:  No diagnosis found.    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)   • Right-sided headache   • Acute ischemic stroke (CMS/HCC)   • Embolic stroke (CMS/HCC)   • Cerebral infarction due to stenosis of left carotid artery (CMS/HCC)   • Anticoagulated by anticoagulation treatment   • Stroke (cerebrum) (CMS/HCC)         Therapy Treatment    IRF Treatment Summary     Row Name 19             Evaluation/Treatment Time and Intent    Subjective Information  -- upsetwhile eating breakfast unable to determine why  -AF      Existing Precautions/Restrictions  fall contact precautions, global aphasia  -AF      Document Type  therapy note (daily note)  -AF      Mode of Treatment  occupational therapy  -AF      Patient/Family Observations  sitting up in dining room, eating breakfast, becomes upset with any movement of herself, w/c of breakfast items attempted to redirect for 15 mins.  -AF      Recorded by [AF] Ingris Ansari OTR      Row Name 19             Cognition/Psychosocial- PT/OT    Affect/Mental Status (Cognitive)  anxious;confused  -AF      Behavioral Issues (Cognitive)  difficulty managing stress;overwhelmed easily  -AF      Orientation Status (Cognition)  unable/difficult to assess would answer to her name  -AF      Follows Commands (Cognition)  follows one step commands;0-24% accuracy  -AF      Personal Safety Interventions  fall prevention program maintained  -AF      Recorded by [AF] Ingris Ansari OTR      Row Name 19             Transfer Assessment/Treatment    Comment  (Transfers)  deferred not willing to leave dining room at this time   -AF      Recorded by [AF] Ingris Ansari OTR      Row Name 08/28/19 0917             Self-Feeding Assessment/Treatment    Heard Level (Self-Feeding)  feeding skills;liquids to mouth;supervision  -AF      Position (Self-Feeding)  supported sitting  -AF      Comment (Self-Feeding)  seated in w/c at table. able to use spoon and fork with R hand with minimal spilling, increased time to eat.  -AF      Recorded by [AF] Ingris Ansari OTR      Row Name 08/28/19 0917             BADL Safety/Performance    Impairments, BADL Safety/Performance  cognition  -AF      Recorded by [AF] Ingris Ansari OTR      Row Name 08/28/19 0917             Pain Scale: FACES Pre/Post-Treatment    Pain: FACES Scale, Pretreatment  0-->no hurt  -AF      Pain: FACES Scale, Post-Treatment  0-->no hurt  -AF      Recorded by [AF] Ingris Ansari OTR      Row Name 08/28/19 0917             Positioning and Restraints    Pre-Treatment Position  sitting in chair/recliner  -AF      Post Treatment Position  wheelchair  -AF      In Wheelchair  sitting;exit alarm on;patient within staff view sitting in dining room  -AF      Recorded by [AF] Ingris Ansari OTR        User Key  (r) = Recorded By, (t) = Taken By, (c) = Cosigned By    Initials Name Effective Dates    AF Ingris Ansari OTR 04/03/18 -           Wound 07/17/19 1015 Left neck incision (Active)   Dressing Appearance open to air 8/27/2019 10:05 PM   Closure Liquid skin adhesive;Approximated 8/27/2019 10:05 PM   Base clean;dry 8/27/2019 10:05 PM   Drainage Amount none 8/27/2019 10:05 PM         OT Recommendation and Plan    Anticipated Equipment Needs At Discharge (OT Eval): bathing equipment  Planned Therapy Interventions (OT Eval): activity tolerance training, BADL retraining, cognitive/visual perception retraining, functional balance retraining, neuromuscular control/coordination retraining,  occupation/activity based interventions, patient/caregiver education/training, ROM/therapeutic exercise, strengthening exercise, transfer/mobility retraining            OT IRF GOALS     Row Name 08/26/19 1315 08/22/19 1543          Transfer Goal 1 (OT-IRF)    Progress/Outcomes (Transfer Goal 1, OT-IRF)  --  goal met  -AF        Transfer Goal 2 (OT-IRF)    Activity/Assistive Device (Transfer Goal 2, OT-IRF)  --  shower chair;walk-in shower;toilet  -AF     [place] Level (Transfer Goal 2, OT-IRF)  --  standby assist;contact guard assist  -AF     Time Frame (Transfer Goal 2, OT-IRF)  --  long term goal (LTG)  -AF     Progress/Outcomes (Transfer Goal 2, OT-IRF)  goal met  -AF  goal ongoing  -AF        Bathing Goal 1 (OT-IRF)    Progress/Outcomes (Bathing Goal 1, OT-IRF)  --  goal met  -AF        Bathing Goal 2 (OT-IRF)    Activity/Device (Bathing Goal 2, OT-IRF)  bathing skills, all;grab bar/tub rail;hand-held shower spray hose;shower chair  -AF  bathing skills, all;grab bar/tub rail;hand-held shower spray hose;shower chair  -AF     [place] Level (Bathing Goal 2, OT-IRF)  standby assist  -AF  contact guard assist  -AF     Time Frame (Bathing Goal 2, OT-IRF)  long term goal (LTG)  -AF  long term goal (LTG)  -AF     Progress/Outcomes (Bathing Goal 2, OT-IRF)  goal ongoing  -AF  goal ongoing  -AF        UB Dressing Goal 1 (OT-IRF)    Activity/Device (UB Dressing Goal 1, OT-IRF)  upper body dressing  -AF  upper body dressing  -AF     [place] (UB Dress Goal 1, OT-IRF)  set-up required  -AF  set-up required  -AF     Time Frame (UB Dressing Goal 1, OT-IRF)  long term goal (LTG)  -AF  long term goal (LTG)  -AF     Progress/Outcomes (UB Dressing Goal 1, OT-IRF)  goal ongoing  -AF  goal ongoing  -AF        LB Dressing Goal 1 (OT-IRF)    Progress/Outcomes (LB Dressing Goal 1, OT-IRF)  --  goal met  -AF        LB Dressing Goal 2 (OT-IRF)    Activity/Device (LB Dressing Goal 2, OT-IRF)  lower body dressing  -AF  lower  body dressing  -AF     [place] (LB Dressing Goal 2, OT-IRF)  standby assist;verbal cues required  -AF  verbal cues required;contact guard assist  -AF     Time Frame (LB Dressing Goal 2, OT-IRF)  long term goal (LTG)  -AF  long term goal (LTG)  -AF     Progress/Outcomes (LB Dressing Goal 2, OT-IRF)  goal ongoing  -AF  goal ongoing  -AF        Grooming Goal 1 (OT-IRF)    Progress/Outcomes (Grooming Goal 1, OT-IRF)  --  goal met  -AF        Grooming Goal 2 (OT-IRF)    Activity/Device (Grooming Goal 2, OT-IRF)  --  grooming skills, all  -AF     [place] (Grooming Goal 2, OT-IRF)  --  supervision required  -AF     Time Frame (Grooming Goal 2, OT-IRF)  --  long term goal (LTG)  -AF     Progress/Outcomes (Grooming Goal 2, OT-IRF)  goal met  -AF  goal ongoing  -AF        Toileting Goal 1 (OT-IRF)    Progress/Outcomes (Toileting Goal 1, OT-IRF)  --  goal met  -AF        Toileting Goal 2 (OT-IRF)    Activity/Device (Toileting Goal 2, OT-IRF)  toileting skills, all;grab bar/safety frame  -AF  toileting skills, all  -AF     [place] Level (Toileting Goal 2, OT-IRF)  standby assist;contact guard assist  -AF  verbal cues required;contact guard assist  -AF     Time Frame (Toileting Goal 2, OT-IRF)  long term goal (LTG)  -AF  long term goal (LTG)  -AF     Progress/Outcomes (Toileting Goal 2, OT-IRF)  goal ongoing  -AF  goal ongoing  -AF        Caregiver Training Goal 1 (OT-IRF)    Caregiver Training Goal 1 (OT-IRF)  pt and family to demo safe techniques with ADLs, transfers, HEP and Adaptive equipment as needed prior to d/c   -AF  family and patient to demo safe technqiues with ADLs, transfers, HEP and adaptive equipment as needed prior to d/c   -AF     Time Frame (Caregiver Training Goal 1, OT-IRF)  long term goal (LTG)  -AF  long term goal (LTG)  -AF     Progress/Outcomes (Caregiver Training Goal 1, OT-IRF)  goal ongoing  -AF  goal ongoing  -AF       User Key  (r) = Recorded By, (t) = Taken By, (c) = Cosigned By     Initials Name Provider Type    AF Ingris Ansari OTR Occupational Therapist          Occupational Therapy Education     Title: PT OT SLP Therapies (Not Started)     Topic: Occupational Therapy (In Progress)     Point: ADL training (In Progress)     Description: Instruct learner(s) on proper safety adaptation and remediation techniques during self care or transfers.   Instruct in proper use of assistive devices.    Learning Progress Summary           Patient Nonacceptance, E, NR,NL by AF at 8/28/2019  9:22 AM    Comment:  after time therapist understood that patient wanted water to drink, educated patient that she needed to brush her teeth prior to thin liquid. unable to get pt to agree to brush teeth    Acceptance, E, NR,NL by JHONNY at 8/19/2019 11:08 PM    Nonacceptance, E,D, NR by DONI at 8/17/2019 11:44 AM    Acceptance, E, VU,NR by SEYMOUR at 8/15/2019  3:22 PM    Comment:  Pt and family participated in meeting with rehab team, recommend 24 hour supervision and would beneift from being in her own environment. discussed her safety awareness and the need for hands on assistance with some ADL tasks seocndary R UE and cognition    Nonacceptance, E, NR by AF at 8/13/2019  3:48 PM    Comment:  benefits and purpose of therapy   Family Acceptance, E, VU,NR by SEYMOUR at 8/15/2019  3:22 PM    Comment:  Pt and family participated in meeting with rehab team, recommend 24 hour supervision and would beneift from being in her own environment. discussed her safety awareness and the need for hands on assistance with some ADL tasks seocndary R UE and cognition                   Point: Home exercise program (In Progress)     Description: Instruct learner(s) on appropriate technique for monitoring, assisting and/or progressing therapeutic exercises/activities.    Learning Progress Summary           Patient Acceptance, E, NR,NL by JHONNY at 8/19/2019 11:08 PM    Nonacceptance, E,D, NR by DONI at 8/17/2019 11:44 AM    Acceptance, E, VU,NR by SEYMOUR at  8/15/2019  3:22 PM    Comment:  Pt and family participated in meeting with rehab team, recommend 24 hour supervision and would beneift from being in her own environment. discussed her safety awareness and the need for hands on assistance with some ADL tasks seocndary R UE and cognition   Family Acceptance, E, VU,NR by AF at 8/15/2019  3:22 PM    Comment:  Pt and family participated in meeting with rehab team, recommend 24 hour supervision and would beneift from being in her own environment. discussed her safety awareness and the need for hands on assistance with some ADL tasks seocndary R UE and cognition                               User Key     Initials Effective Dates Name Provider Type Discipline    JS 04/06/17 -  Steph Salinas, HESHAM Physical Therapist PT    AF 04/03/18 -  Ingris Ansari, OTR Occupational Therapist OT    WN 06/24/19 -  Luis Abernathy RN Registered Nurse Nurse                       Time Calculation:     Time Calculation- OT     Row Name 08/28/19 0923             Time Calculation- OT    OT Start Time  0800  -AF      OT Stop Time  0815  -AF      OT Time Calculation (min)  15 min  -AF        User Key  (r) = Recorded By, (t) = Taken By, (c) = Cosigned By    Initials Name Provider Type    AF Ingris Ansari, OTR Occupational Therapist          Therapy Charges for Today     Code Description Service Date Service Provider Modifiers Qty    21508309894 HC OT SELF CARE/MGMT/TRAIN EA 15 MIN 8/27/2019 Ingris Ansari OTR GO 2    05070117302 HC OT SELF CARE/MGMT/TRAIN EA 15 MIN 8/28/2019 Ingris Ansari OTR GO 1                   JOHN Mejia  8/28/2019

## 2019-08-28 NOTE — PROGRESS NOTES
Inpatient Rehabilitation Functional Measures Assessment    Functional Measures  KATI Eating:  [facility] Grooming: [facility] Bathing:  [facility] Upper Body Dressing:  [facility] Lower Body Dressing:  [facility] Toileting:  [facility] Bladder Management  Level of Assistance:  [place]  Frequency/Number of Accidents this Shift:  [facility] Bowel Management  Level of Assistance: [place]  Frequency/Number of Accidents this Shift: [facility] Bed/Chair/Wheelchair Transfer:  [facility] Toilet Transfer:  [facility] Tub/Shower Transfer:  [place]    Previously Documented Mode of Locomotion at Discharge: Field  KATI Expected Mode of Locomotion at Discharge: [facility] Walk/Wheelchair:  [facility] Stairs:  [facility] Comprehension:  Auditory comprehension is the usual mode. Patient does not  comprehend complex/abstract information in their primary language without  assistance from a helper. Comprehension Score = 3, Moderate Prompting. Patient  comprehends basic daily needs or ideas 50-74% of the time. Patient requires  moderate/some prompting. No assistive devices were required.  KATI Expression:  Vocal expression is the usual mode. Patient does not express  complex/abstract information in their primary language without a helper.  Expression Score = 2, Maximal Prompting. Patient expresses basic daily needs  25-49% of the time. Patient uses only single words or gestures and requires  maximal/a lot of prompting (most of the time). No assistive devices were  required.  KATI Social Interaction:  Social Interaction Score = 3, Moderate Direction.  Patient interacts appropriately 50-74% of the time.  Patient requires  moderate/some direction for the following behavior(s): Non-interactive.  KATI Problem Solving:  Patient does not make appropriate decisions in order to  solve complex problems without assistance from a helper. Problem Solving Score =  1, Total Assistance. Patient makes appropriate decisions in order to  solve  routine problems less than 25% of the time. Patient requires total direction for  the following behavior(s): Decreased awareness of performance. Difficulty  completing tasks. Difficulty with self-monitoring. Inability to follow simple  commands. Poor judgment. Impulsivity.  KATI Memory:  Activity was not observed.    Therapy Mode Minutes  Occupational Therapy: Branch  Physical Therapy: Branch  Speech Language Pathology:  Branch    Signed by: Di Saldaña RN

## 2019-08-28 NOTE — PLAN OF CARE
Problem: Fall Risk (Adult)  Goal: Absence of Fall  Outcome: Ongoing (interventions implemented as appropriate)   08/28/19 1128   Fall Risk (Adult)   Absence of Fall making progress toward outcome       Problem: Patient Care Overview  Goal: Plan of Care Review  Outcome: Ongoing (interventions implemented as appropriate)   08/28/19 1128   Patient Care Overview   IRF Plan of Care Review progress ongoing, continue   Progress, Functional Goals demonstrating adequate progress   Coping/Psychosocial   Plan of Care Reviewed With patient   OTHER   Outcome Summary Patients discharge was cancelled, awaiting lab results- due to discharge on Friday       Problem: Stroke (IRF) (Adult)  Goal: Promote Optimal Functional [place]  Outcome: Ongoing (interventions implemented as appropriate)   08/28/19 1128   Stroke (IRF) (Adult)   Promote Optimal Functional [place] demonstrating adequate progress

## 2019-08-28 NOTE — PLAN OF CARE
Problem: Skin Injury Risk (Adult)  Goal: Skin Health and Integrity  Outcome: Ongoing (interventions implemented as appropriate)   08/28/19 1056   Skin Injury Risk (Adult)   Skin Health and Integrity making progress toward outcome       Problem: Fall Risk (Adult)  Goal: Absence of Fall  Outcome: Outcome(s) achieved Date Met: 08/28/19 08/28/19 1056   Fall Risk (Adult)   Absence of Fall other (see comments)  (preparing for discharge)       Problem: Patient Care Overview  Goal: Plan of Care Review  Outcome: Outcome(s) achieved Date Met: 08/28/19 08/28/19 1056   Patient Care Overview   IRF Plan of Care Review discharge pending   Progress, Functional Goals preparing for discharge   Coping/Psychosocial   Plan of Care Reviewed With patient;family   OTHER   Outcome Summary Patient diet upgraded this morning ot regular/thins, assisst x1, impulsive at times. She is continent/incontinent at times and takes medication crushed with applesauce.Therapy stated, we could try medication whole with applesauce. Unable to assess memory, incision-WILLIAM, and is an accucheck AC/HS.       Problem: Stroke (IRF) (Adult)  Goal: Promote Optimal Functional [place]  Outcome: Outcome(s) achieved Date Met: 08/28/19 08/28/19 1056   Stroke (IRF) (Adult)   Promote Optimal Functional [place] preparing for discharge

## 2019-08-28 NOTE — PROGRESS NOTES
Inpatient Rehabilitation Plan of Care Note    Plan of Care  Care Plan Reviewed - No updates at this time.    Psychosocial    Performed Intervention(s)  Assist patient to express needs and concerns  calm enviroment      Safety    Performed Intervention(s)  Bed alarm, wc alarm  Safety rounds  Items within reach      Body Systems    Performed Intervention(s)  Daily skin inspection      Sphincter Control    Performed Intervention(s)  Monitor intake and output  Encourage fluid intake  Elimination schedule  contact isolation    Signed by: Eryn Yan RN

## 2019-08-28 NOTE — PROGRESS NOTES
Occupational Therapy: Individual: 15 minutes.    Physical Therapy: Branch    Speech Language Pathology:  Branch    Signed by: Ingris Ansari OT

## 2019-08-28 NOTE — PROGRESS NOTES
Inpatient Rehabilitation Functional Measures Assessment    Functional Measures  KATI Eating:  Eating Score = 5. Patient is supervision/set-up for eating,  requiring: No assistive devices were required.  [facility] Grooming: [facility] Bathing:  [facility] Upper Body Dressing:  [facility] Lower Body Dressing:  [facility] Toileting:  [facility] Bladder Management  Level of Assistance:  [place]  Frequency/Number of Accidents this Shift:  [facility] Bowel Management  Level of Assistance: [place]  Frequency/Number of Accidents this Shift: [facility] Bed/Chair/Wheelchair Transfer:  [facility] Toilet Transfer:  [facility] Tub/Shower Transfer:  [place]    Previously Documented Mode of Locomotion at Discharge: Field  KATI Expected Mode of Locomotion at Discharge: [facility] Walk/Wheelchair:  [facility] Stairs:  [facility] Comprehension:  Auditory comprehension is the usual mode. Patient does not  comprehend complex/abstract information in their primary language without  assistance from a helper. Comprehension Score = 2, Maximal Prompting. Patient  comprehends basic daily needs 25-49% of the time. Patient understands simple  information via single words or gestures. Requires maximal/a lot of prompting  (most of the time). No assistive devices were required.  KATI Expression:  Vocal expression is the usual mode. Patient does not express  complex/abstract information in their primary language without a helper.  Expression Score = 2, Maximal Prompting. Patient expresses basic daily needs  25-49% of the time. Patient uses only single words or gestures and requires  maximal/a lot of prompting (most of the time). No assistive devices were  required.  KATI Social Interaction:  Social Interaction Score = 2, Maximal Direction.  Patient interacts appropriately 25-49% of the time. Patient requires maximal/a  lot of direction (most of the time) for the following behavior(s):  KATI Problem Solving:  Activity was not observed. Unable  to assess due to  severity of language impairment.  KATI Memory:  Activity was not observed. Unable to assess due to severity of  aphasia.    Therapy Mode Minutes  Occupational Therapy: Branch  Physical Therapy: Branch  Speech Language Pathology:  Individual: 60 minutes.    Signed by: Lela Bueno, SLP

## 2019-08-28 NOTE — CONSULTS
The patient is seen briefly today.  She remains in bed and is able only to say the words yes and no and is unable to provide answers to questions.  I spoke with Dr. Howard regarding post discharge pharmacotherapeutic interventions.  I do not feel as though the patient is in need of scheduled Zyprexa but would continue her trial of paroxetine.  Upper titration of this medication may be considered if the patient's depression persists.

## 2019-08-28 NOTE — DISCHARGE INSTRUCTIONS
Diet-consistent carb, regular with thins, no straws. She continues to need 1:1 supervision for meals  . She can take meds crushed in puree    Blood glucose check q AC and HS    Recheck CMP-liver enzymes and lipid panel on statin-atorvastatin the first week of October.  Recheck CBC every 2 weeks and BMP every 2 weeks.

## 2019-08-28 NOTE — THERAPY TREATMENT NOTE
Inpatient Rehabilitation - Speech Language Pathology Treatment Note    [facility]       Patient Name: Darby Workman  : 1945  MRN: [id number]    Today's Date: 2019           Admit Date: 2019      Visit Dx:      No diagnosis found.    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)   • Right-sided headache   • Acute ischemic stroke (CMS/HCC)   • Embolic stroke (CMS/HCC)   • Cerebral infarction due to stenosis of left carotid artery (CMS/HCC)   • Anticoagulated by anticoagulation treatment   • Stroke (cerebrum) (CMS/HCC)          Therapy Treatment    Evaluation/Coping    Evaluation/Treatment Time and Intent  Subjective Information: no complaints (19 08 : Lela Bueno SLP)  Existing Precautions/Restrictions: fall (19 1430 : Lela Bueno, SLP)  Document Type: therapy note (daily note) (19 1430 : Lela Bueno SLP)  Mode of Treatment: speech-language pathology (19 1430 : Lela Bueno, SLP)  Patient/Family Observations: Pt appeared agitated upon arrival in room, holding CNA's hand; however, when treatment initiated in room, pt worked well with clinician. Appears very frustrated by her situation. (19 1430 : Lela Bueno, SLP)  Start Time (Evaluation/Treatment): 1430 (19 1430 : Lela Bueno SLP)  Stop Time (Evaluation/Treatment): 1500 (19 1430 : Lela Bueno, SLP)    Vitals/Pain/Safety    Pain Scale: Numbers Pre/Post-Treatment  Pain Scale: Numbers, Pretreatment: 0/10 - no pain (19 08 : Lela Bueno SLP)  Pain Scale: Numbers, Post-Treatment: 0/10 - no pain (19 08 : Lela Bueno, SLP)  Pain Scale: FACES Pre/Post-Treatment  Pain: FACES Scale, Pretreatment: 0-->no hurt (19 1430 : Leal Bueno SLP)  Pain: FACES Scale, Post-Treatment: 0-->no hurt (19 1430 : Lela Bueno,  "SLP)    Cognition/Communication       EDUCATION    The patient has been educated in the following areas:     Communication Impairment.    SLP Recommendation and Plan                                                   Plan of Care Reviewed With: patient      SLP GOALS     Row Name 08/28/19 1430 08/28/19 0830 08/27/19 1230       Oral Nutrition/Hydration Goal 1 (SLP)    Barriers (Oral Nutrition/Hydration Goal 1, SLP)  --  Pt observed with trials of regular, mixed, puree and thins by cup (refused thins by straw). No overt s/s of aspiration or penetration or penetration observed with any consistency. Trace oral residue observed with mixed. Recommend change diet to regular with thins (no straws), 1:1 supervision for meals.  -AL  Pt exhibited no s/s of aspiration or penetration in 5/5 trials of water via cup. Plan to observe again with meal tomorrow prior to upgrading diet.  -AL    Progress/Outcomes (Oral Nutrition/Hydration Goal 1, SLP)  --  good progress toward goal  -AL  good progress toward goal  -AL       Words/Phrases/Sentences Goal 1 (SLP)    Progress/Outcomes (Identify Objects and Pictures Goal 1, SLP)  good progress toward goal  -AL  --  --    Comment (Words/Phrases/Sentences Goal 1, SLP)  Pt counted 1-10 with 50-70% accuracy in unison with clinician. She stated 5/5 functional phrases using melodic intonation with MAX Cues. She produced 4/5 one-word phrase completions (opposites) in unison with clinician and with written cues. She did not sing unison with clinician today; stated \"no.\"  -AL  --  --       Comprehend Questions Goal 1 (SLP)    Progress/Outcomes (Comprehend Questions Goal 1, SLP)  --  --  good progress toward goal  -AL    Comment (Comprehend Questions Goal 1, SLP)  --  --  Personal yes/no questions: 100% with NO cues. Met x1.  -AL       Word Retrieval Skills Goal 1 (SLP)    Progress/Outcomes (Word Retrieval Goal 1, SLP)  --  --  good progress toward goal  -AL    Comment (Word Retrieval Goal 1, SLP)  --  " "--  Pt stated her first and last name and her 's name with MAX cues. She approximated her daughter's name with MAX cues. Using melodic intonation technique, pt produced 1/5 funtional phrases with MIN cues and 5/5 with MAX cues. Confrontation naming of basic objects: 20% with NO cues, 40% with MOD cues, 100% with MAX cues; apraxia impacted task. Pt counted 1-10 with 20% accuracy with MIN cues, 80% with MAX cues. She stated RAJAT with 100% accuracy with MAX cues.  -AL       Word Retrieval Skills Goal 2 (SLP)    Progress/Outcomes (Word Retrieval Goal 2, SLP)  --  good progress toward goal  -AL  --    Comment (Word Retrieval Goal 2, SLP)  --  Pt produced 2/3 functional phrases using melodic intonation with MAX cues; approximated the 3rd phrase.   -AL  --    Row Name 08/27/19 1100 08/26/19 1100          Oral Nutrition/Hydration Goal 1 (SLP)    Barriers (Oral Nutrition/Hydration Goal 1, SLP)  Pt observed with breakfast meal of mechanical soft/no mixed and thin liquid (water). She refused NTL this AM. No overt s/s of aspiration or penetration observed with any consistency. Minimal right labial residue observed, requiring cues to clear. No oral residue observed. Plan to observe again with meal of mechanical soft/ no mixed with thins prior to upgrading.  -AL  --     Progress/Outcomes (Oral Nutrition/Hydration Goal 1, SLP)  good progress toward goal  -AL  --        Word Retrieval Skills Goal 1 (SLP)    Comment (Word Retrieval Goal 1, SLP)  Pt was unable to state functions of objects despite MAX cues; however, appropriate attempt observed x1.  She sang Amazing Angie in unison with clinician with ~50% accuracy when cued to watch clinician's mouth.  -AL  Pt produced ~50% of \"Our Father\" in unison with clinician (pt brought [place] to therapy). She was able to sing \"Amazing Angie\" in unison with clinician with 75% accuracy with MAX cues; benefits from cues to look at SLP face while singing. She produced 3 functional phrases " and her name with melodic intonation technique and MAX cues. In supported communication, she communicated her number of kids with MOD-MAX cues (benefited from written choices).  -AL        Graphic Expression of Words Goal 1 (SLP)    Progress/Outcomes (Graphic Expression of Single Words Goal 1, SLP)  good progress toward goal  -AL  --     Comment (Graphic Expression of Single Words Goal 1, SLP)  Pt copied her full name with NO cues. She was unable to copy her 's name. Appeared to attempt to write her daughter's name, but could not be cued to accuracy.  -AL  --       User Key  (r) = Recorded By, (t) = Taken By, (c) = Cosigned By    Initials Name Provider Type    Lela Holley SLP Speech and Language Pathologist                  Time Calculation:       Time Calculation- SLP     Row Name 08/28/19 1514 08/28/19 1248          Time Calculation- SLP    SLP Start Time  1430  -AL  0830  -AL     SLP Stop Time  1500  -AL  0900  -AL     SLP Time Calculation (min)  30 min  -AL  30 min  -AL       User Key  (r) = Recorded By, (t) = Taken By, (c) = Cosigned By    Initials Name Provider Type    Lela Holley SLP Speech and Language Pathologist            Therapy Charges for Today     Code Description Service Date Service Provider Modifiers Qty    55898416970 HC ST TREATMENT SWALLOW 1 8/27/2019 Lela Bueno SLP GN 1    41573487062 HC ST TREATMENT SPEECH 1 8/27/2019 Lela Bueno SLP GN 1    64785722211 HC ST TREATMENT SPEECH 1 8/27/2019 Lela Bueno SLP GN 2    52094623647 HC ST TREATMENT SWALLOW 2 8/28/2019 Lela Bueno SLP GN 1    [id number] HC ST TREATMENT SPEECH 2 8/28/2019 Lela Bueno SLP GN 1                           WHIT Tavarez  8/28/2019

## 2019-08-28 NOTE — DISCHARGE SUMMARY
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  Dysphagia- History of multiple bilateral strokes and left central retinal artery occlusion  History of left greater than right internal carotid artery stenosis-status post left internal carotid artery stent July 17, 2019  History of hypertension  History of diabetes mellitus  Anemia  Vitamin D deficiency    History of Present Illness  Patient is a 74-year-old female with a history of diabetes mellitus, hypertension, hyperlipidemia with a history of multiple bilateral ischemic infarcts, largest involving left occipital left temporal lobe, and  history of left central retinal artery occlusion in May of this year, admission from May 14 to May 18..  Also noted was left greater than right internal carotid artery stenosis and left P2 stenosis.  She was on aspirin and Plavix.  ZIO patch placed. She had readmission to the hospital on May 21 with findings of left temple, left temporal parietal, left occipital and multiple bilateral ischemic infarcts.  Regimen was changed to aspirin and Eliquis.  Left internal carotid artery 85% stenosis of right internal carotid artery 60% stenosis.  Carotid duplex on June 17 showed left internal carotid artery 80-99% stenosis in right internal carotid artery 50-59% stenosis.       She therefore on 7/17/2019 underwent left internal carotid artery stent.  Postoperatively CT of the brain on July 19 showed multiple old small bilateral infarcts as well as very subtle loss of gray-white matter differentiation that involves the entire left corona radiata region, internal capsule, putamen, external capsule, insular cortex, left temporal lobe, posterior inferior left frontal and anterior and lateral left parietal lobe that measures up to 8.4 x 4.5 x 4.5 cm in  anteroposterior and mediolateral and craniocaudal dimension - compatible with a very subtle acute left middle cerebral artery territory infarct and there is additional patchy area of intraparenchymal hemorrhage in the posterior lateral left parietal-occipital region that measures 2 x 1.5 cm in size likely a petechial hemorrhage at the posterior margin of the infarct. Ventricles are currently normal in size. There is no significant mass effect or midline shift.     She has had a aphasia and spastic right hemiparesis.  She has dysphagia and was on Cortrak tube feeds.    On July 25 with physical therapy transfers moderate assist of 2.  Sitting balance contact-guard assist.  She  had decreased alertness.  On July 25 she underwent CTA/perfusion.  The left internal carotid artery stent and the left middle cerebral artery were patent.  Perfusion images with no significant findings.  July 26 with the nursing staff she took a couple steps to the chair at the bedside.    She has shown more alertness and participation in therapy.    On July 27-amantadine added for neuro stimulation  On July 29-Aphasia, some short response Gait 30 feet mod/min x 2.  Toileting dependent. Requires max verbal cues with OT.  DHT feeds. Improved performance in therapies.  On July 31-underwent a videofluoroscopic swallow study.  Started on puréed/honey thick liquid diet.    VFSS completed. Recommend pureed diet with HTL; meds crushed in pureed; upright for meals and 30 min after; slow rate; small bites/sips; no straws; supervision with meals. Pt's cognitive status played a part in swallowing. ST to follow for diet tolerance and reeval as indicated.  With physical therapy on July 30 patient was more verbal.  She said occasional single word.  Minimal assist with bed mobility.  Transfers minimal assist 2.  Amylase 30 feet minimum assist of 2/moderate assist.  Requires verbal cues during ambulation for him placement on the walker and assistance with  guidance of the rolling walker.  She continues with bilateral wrist restraints.  She continues with aphasia.  She will make some utterances.  She will do occasional word but not in the context of the question.  She was not able to state her name.     Given her functional impairments and comorbidities she is now admitted for acute inpatient rehab           Review of Systems   Not obtainable given the patient's aphasia  Medical History        Past Medical History:   Diagnosis Date   • Acute cerebrovascular accident (CVA) of cerebellum (CMS/[facility])     • Acute ischemic stroke (CMS/[facility])     • Arthritis     • Coronary artery disease     • CVA (cerebral vascular accident) (CMS/[facility])     • Diabetes mellitus (CMS/[facility])     • Heart attack (CMS/[facility])     • History of blood clots     • Hyperlipidemia     • Hypertension     • Vision loss           Surgical History         Past Surgical History:   Procedure Laterality Date   • CAROTID ENDARTERECTOMY Left 7/17/2019     Procedure: Left carotid endarterectomy;  Surgeon: Rupert Oliva MD;  Location: [facility];  Service: Neurosurgery   • EMBOLECTOMY Left 7/17/2019     Procedure: CEREBRAL ANGIOGRAM, LEFT INTERNAL CAROTID ARTERY STENT;  Surgeon: Rupert Oliva MD;  Location: Replaced by [facility] OR 18/19;  Service: Neurosurgery         Family History   Problem Relation Age of Onset   • Arthritis Mother     • Heart disease Father     • Breast cancer Neg Hx     • Malig Hyperthermia Neg Hx        Social History            Tobacco Use   • Smoking status: Never Smoker   • Smokeless tobacco: Never Used   Substance Use Topics   • Alcohol use: No       Frequency: Never   • Drug use: No   .  Lives with her .      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  If add  Namenda, will probably discontinue amantadine as she continues alert.  August 11-she has some increased irritability at times.  This may be related to the underlying stroke deficits itself.  She does not appear to have any dysuria, no odor to her urine.  Staff reports that for the most part she is eating okay.  Will check a follow-up CT of the head to assess for any interval visual changes.  She is on aspirin and Eliquis for stroke prophylaxis.  She had noticeably improved alertness when amantadine was started.  She is readily alert now.  This may be related to natural recovery in terms of her level of arousal at this point.  Current plan is to discontinue the amantadine to see if that helps with her irritability and start  on Namenda for aphasia.  August 12 -  Patient with increased restlessness at times.   Continues aphasia. Not able to identify any complaint.  Appears anxious today. No change on CT head yesterday. Started on Namenda for aphasia on 8/12/19.   August 13- will continue to monitor on recent med adjustments   August 15-She continues with expressive language deficits.    Again appears anxious related to her aphasia and difficulty expressing herself.    She will be able to get occasional single word for the examiner.  She does follow instructions.  The patient was reviewed with .  Discussed adding on an antidepressant with anxiolytic properties -Paxil-as it appears frustration from her aphasia with associated anxiety with the frustration affects her performance.  We will plan on starting Paxil 10 mg daily tomorrow and monitor response.  Reviewed with the patient's  that would not add a short acting anxiolytic (such as a benzodiazepine or Seroquel) as it may affect her cognitive performance.    August 16-started Paxil 10 mg daily  August 19 - Increased restlessness Friday night - ? Related to UTI vs initiation of Paxil. On Cefdinir for GNR pending ID &Sensitivity.  August 20 -urine culture  "with E. coli ESBL.  Given her aphasia not able to obtain information regarding dysuria.  As the urinalysis was ordered as part of work-up for increased restlessness this weekend, would have to treat as symptomatic although could be asymptomatic bacteriuria.  Will place on ertapenem 1 g IV daily for 3-5 days.  August 21 - increased restlessness/anxiety today. Labs without any acute change. Blood glucose okay this AM. BP pattern okay. No gross motor changes. Follow on current regimen for now.   Addendum-Patient continued today with  increased restlessness/anxiety/refusing aspects of nursing care. She had had a good day in therapies yesterday. She is calmer now with family present. Did allow IV antibiotic to be infused but has not taken PO meds or eaten dinner yet.   Discussed with patient's /children will need to hold on planned discharge tomorrow given her mood/behavior today.   I do not feel Namenda (started as trial for aphasia) is related as had episodes of restlessness prior to starting, but will discontinue for now so  slate with psychoactive medications. Continue Paxil for now. Will consult Psychiatry for input.  August 22-patient more cooperative today, taking medications and will participate in therapies.  Still perseverates on \"no\".  Still with some anxiety related to her aphasia but less than yesterday.  Seen by psychiatry and to continue with Paxil 10 mg daily.  Also order written for Zyprexa 5 mg IM every 8 hours as needed agitation.  To observe on current regimen.  August 26 - still gets anxious/frustrated by aphasia. Not participate with therapies or eat meal at times. Continues on Paxil. Will ask Psychiatry for any additional thoughts in AM.   August 27-patient received Zyprexa 5 mg IM at about 830 last evening.  Slept last night.  She is not anxious this morning.  Participating with activities.  Will plan to get updated assessment from psychiatry for any adjustment in regimen before " discharge to subacute - per facility cannot go on a prn medication. Addendum: per Psychiatry,  recommend giving Paxil opportunity to exert its clinical effect over the next 1-1/2 to 2 weeks.  August 28-she did better last evening per nursing.  Reported to sleep well.  She is received Zyprexa as needed only once in the past 6 days. Patient reviewed with Psychiatry.  Will discontinue Zyprexa as needed.   Will place on melatonin 3 mg at nighttime scheduled for sleep.  As patient's overall status appears stable and not making any new interventions at this time, allowing Paxil and opportunity to take effect, will look at discharge to subacute [facility] today.  She will need follow-up assessments by the physicians at that facility to adjust her medication - Paxil - as needed.     Aphasia -  August 12 - trial of Namenda  August 16-continue to observe on Namenda 5 mg daily for her aphasia and may possibly titrate up next week  August 20-titrate up on Namenda to 5 mg twice a day  August 22-discontinued Namenda.  Do not feel related to her anxiety/restlessness as it was present prior to starting but discontinued to avoid any additional psychoactive medications that might add to it.  Had not seen any improvement in her aphasia with the very brief trial.     Dysphagia-Cortrak feeding tube discontinued on July 31 and started on puréed/honey thick liquid diet with strategies  August 7-advance to fork mash nectar thick liquid diet, consistent carbohydrate.  August 14 - repeat VFSS to assess for advancing to thin liquids  August 15-Video fluoroscopic swallow study today-mechanical soft, no mixed consistency, nectar thick liquid, water protocol between meals, no straws. May take meds whole in Puree or NTL as tolerated. May have ice chips or sips of water in between meals after oral care per protocol.  August 28 - Pt is tolerating trials of regular with thins by cup.   - upgrade to regular with thins, no straws. She  continues to need 1:1 supervision for meals  . She can take meds crushed in puree.     History of multiple bilateral strokes and left central retinal artery occlusion     History of left greater than right internal carotid artery stenosis-status post left internal carotid artery stent July 17, 2019  Follow with Neurosurgery in office in October with carotid ultrasound     History of hypertension -  Hyzaar 100-25. August 4 - Bystolic increased to 20 mg daily to 20 mg bid.  August 8 - BP still runs high. On discharge in May 2019 was on Hyzaar 100-25 mg daily, Bystolic 20 mg daily, amlodipine 10 mg daily, Hydralazine 50 mg q 8 hours.  Will add Hydralazine initially 10 mg po q 8 hours and titrate up as needed.   August 9-systolic blood pressure elevated last night and early this morning but better this afternoon at 122-130.  Continue to follow recent addition of hydralazine and titrate up as needed  August 10-systolic blood pressure 175 this afternoon, range otherwise 122-142.  Will titrate up on hydralazine to 20 mg every 8 hours.  August 11-hypertension overall appears improved.  Will titrate up further to 25 mg every 8 hours.  August 12 - add amlodipine 5 mg daily.   August 14 - titrate up on hydralazine to 50 mg q 8 hours  August 15-blood pressure improved this afternoon with systolic blood pressure in the 120s-follow on current regimen  August 16-blood pressure range 110////59-will continue to monitor on present regimen  August 19 - /74 early AM, later 136/71 - increase amlodipine to 5 mg bid  August 28-blood pressure range 112-140/60-68     History of diabetes mellitus -Lantus/glipizide (home medication metformin 1000 mg twice daily and glipizide 10 mg twice daily)  August 1-blood sugars were low yesterday afternoon after tube feeds discontinued.  Held glipizide last night and this morning and Lantus last night.  Blood sugar elevated at noontime today.  Will receive resume glipizide at a  lower dose of 5 mg twice a day with meals.  Continue sliding scale insulin coverage.  She also is on metformin at home.  August 5-add back metformin initially at 500 mg twice a day and continue glipizide 5 mg twice a day, both one half of previous home dose, titrate up as needed.  August 7-titrate up metformin to 850 mg twice a day.  Add consistent carbohydrate restriction to diet.  August 9-increase metformin to 1000 g twice a day.  August 10-blood glucose 340 last evening but 150-114-178 so far today  August 11-continue to follow blood sugar pattern and may increase glipizide further as current dose glipizide 5 mg twice a day along with metformin 1000 mg twice a day  August 14 - blood glucose  past 24 hours - monitor pattern.  August 15-blood glucose [phone]--[phone]-continue to follow pattern.  Will change sliding scale insulin coverage to Humalog at a lower dose.  She was started on the regular insulin sliding scale when she was on tube feeds.  August 16 - recent blood glucose -[phone]  August 19 - recent blood glucose [phone] - increase glipizide to 7.5 mg bid  August 21 - refused blood glucose check today. Will decrease glipizide back to 5 mg bid to avoid potential for hypoglycemia until allow blood glucose check.   August 28-recent blood glucose checks [phone] this morning.  Continues on metformin thousand milligrams twice a day and glipizide 5 mg twice a day.  The one low blood sugar yesterday afternoon is not typical and will not make any adjustments today in her regimen but would continue to follow and possibly decrease morning glipizide dose if needed.     Stroke prophylaxis-aspirin/Eliquis/atorvastatin     Anemia-August 1-hemoglobin 7.4.  Most recent check on July 23 HGB 9.3.  Will recheck hemoglobin this afternoon.  Hemoccult stool.  Iron studies.  Reticulocyte count.  Recheck CBC in the a.m.  Added Protonix.  Patient is on aspirin and Eliquis.  With recent stroke   will look to transfuse packed red blood cell.  August 2-hemoglobin improved 9.0-1 unit packed red blood cells.  Elevated reticulocyte count in response to anemia.  Nursing describes dark stool.  Patient discussed with gastroenterology.  At this point unable to do endoscopy as on Eliquis and aspirin.  Will treat symptomatically for now with increasing proton pump inhibitor and transfusing as needed.  August 5-anemia improved-hemoglobin 9.8  August 16-hemoglobin unchanged 9.8  August 28-hemoglobin remained stable on protein pump inhibitor twice a day.     DVT prophylaxis-SCDs/anticoagulation     Impulsivity-   Nursing to do re-orientation with the patient.  Room close to the nursing station.     Endocrine-vitamin D deficiency-ergocalciferol 50,000 units weekly x8 weeks added. Vitamin B12 level and TSH checked in late May unremarkable     Urinary tract infection-August 20 -urine culture with E. coli ESBL.  Given her aphasia not able to obtain information regarding dysuria.  As the urinalysis was ordered as part of work-up for increased restlessness this weekend, would have to treat as symptomatic although could be asymptomatic bacteriuria.  Placed on ertapenem 1 g IV daily for 3  days.     Impaired cognition/impaired language, impaired swallow, impaired activity daily living, impaired mobility     REHAB- admit for comprehensive acute inpatient rehabilitation .  This would be an interdisciplinary program with physical therapy 1 hour,  occupational therapy 1 hour, and speech therapy 1 hour, 5 days a week.  Rehabilitation nursing for carryover, monitoring of cardiopulmonary and neurologic   status, bowel and bladder, and skin  Ongoing physician follow-up.  Weekly team conferences.  Goals are indeterminant.   Rehabilitation prognosis determined.  Medical prognosis determined.  Estimated length of stay is indeterminate.     TEAM CONF - AUGUST 6- TRANFERS CTG. GAIT UP  FEET CTG-MIN ASSIST. UBD MIN. LBD MOD. BATH MOD.  SEVERE APHASIA. INCREASED RESISTANCE TO PARTICIPATE AT TIMES.   PUREE/HTL.  GOOD PO INTAKE. ADJUSTING MEDS FOR DIABETES MELLITUS.  BLADDER CONTINENT/INCONTIENT.   ELOS- 2 WEEKS.      TEAM CONF - AUGUST 13 - DID GREAT WITH PT ON Saturday, FOLLOWING COMMANDS AND BATTING A BALL WITH ANOTHER PATIENT. YESTERDAY, VERY FRUSTRATED AND IRRITABLE.  FRUSTRATED BY APHASIA, TRYING TO TELL STAFF SOMETHING. WILL HOLD ON TO OBJECTS, REPEAT SINGLE WORD.   BED CTG. 4 STAIRS CTG.  GAIT 220 FEET CTG-SBA NO DEVICE.  TOILET TRANSFERS CTG-MIN.  GROOMING MIN.  UBD MIN ASSIST FOR BRA, LBD CTG-MIN. BATH CTG-MIN. COORDINATION RUE BETTER.  DYSPHAGIA - IMPROVED - FORK MASH, NTL. DO NOT FEEL SHE WOULD TOLERATE E-STIM. RECEPTIVE LANGUAGE IMPROVED SLIGHTLY , POINTING TO OBJECTS. EXPRESSIVELY PERSEVERATIVE ON A SINGLE WORD, TRIES TO USE PICTURE BOARD TO COMMUNICATE. YES/NO NOT ACCURATE.  CONTINENT BOWEL AND BLADDER, TIMED VOIDS. SKIN INTACT. TYPICALLY GOOD INTAKE.  ELOS - 1.5 WEEKS     AUGUST 14 - In therapies - In OT, increased participation noted with  present   Transfers SBA/CTG  Walked from therapy gym to room without AD SBA and vc's for directions back to the room   300' outdoors on sidewalk, incline; 300', 180', 100' up on rehab unit. Pt was able to find her room when she got close to it  Bathing, dressing, grooming min assist. Toileting moderate assist.  Pt participated in using R hand to manipualte round pegs into peg board by color with MIN difficutly once pt caught on to the task. with 3D block recreation with R hand with 100% color match, 80% accuracy with block recreation  With SLP, patient was not able to effectively communicate her wants/needs but refused to go to therapy office by planting her heels while rolling in wc down the [place]; tx session completed in her room      August 15-The patient was reviewed with .  Discussed adding on an antidepressant with anxiolytic properties -Paxil-as it appears frustration from her  aphasia with associated anxiety with the frustration affects her performance.  We will plan on starting Paxil 10 mg daily tomorrow and monitor response.  Reviewed with the patient's  that would not add a short acting anxiolytic (such as a benzodiazepine or Seroquel) as it may affect her cognitive performance.    August 16-Patient was reviewed with her daughter today including rehabilitation goals, discharge planning, and recent medication adjustment to try to help with her anxiety/frustration with her aphasia.  Reviewed with the daughter that on discussion with the team is felt that she would do better with her functional status/aphasia/anxiety if able to be discharged to home environment with more frequent interactions but if that is not feasible with the need to look at subacute options.     TEAM CONF - AUGUST 20 - GAIT CTG- FEET. NO DEVICE, WALKS TO AND FROM GYM.  ADLS CTG WITH CUING.  PLAYED ENTIRE GAME OF CHECKERS YESTERDAY. VARIABLE PERFORMANCE WITH SPEECH THERAPY 20-90% MATCHING WORDS TO PICTURES.    MECH SOFT, GROUND MEAT, NECTAR THICK LIQUIDS.   TYPICALLY CONTINENT BLADDER BUT INCONTINENT BOWEL. DID FINE LAST EVENING PER SISTER AND DID NOT TRY TO GET UP- WILL DISCONTINUE SITTER. IN ROOM CLOSE TO NURSING STATION.   BEST- FAMILY CONF LAST WEEK- WILL WORK TOWARD SUBACUTE PLACEMENT      August 23-upper body dressing set up, lower body dressing min assist.  Grooming supervision.  Transfers standby assist.  Ambulated 220 feet without a device contact-guard standby assist.  She spent in therapy this morning but not with physical therapy this afternoon     TEAM CONF - AUGUST 27 - CONTINUES WITH APHASIA. WORKED ON MELODIC INTONATION. BED SBA. 4 STAIRS CTG. GAIT 300 FEET SBA. TOILET TRANSFERS SBA. SHOWER TRANSFERS SBA. VOIDING ON OWN. UBD MIN ASSIST. LBD CTG-MIN. BATH CTG. DIET - MECH SOFT, GROUND MEATS, NECTAR THICK LIQUIDS. NOT ABLE TO MANAGE AT HOME IN SHORT TERM. WILL ASK PSYCHIATRY SEE IN FOLLOW UP  "FOR ANY FINAL ADJUSTMENTS IN REGIMEN BEFORE DISCHARGE TO SUBACUTE.  .  ELOS- DISPOSITION PENDING.      August 28 -  As patient's overall status appears stable and not making any new interventions at this time, allowing Paxil and opportunity to take effect, will look at discharge to subacute at [facility] today.  She will need follow-up assessments by the physicians at that facility to adjust her medication as needed.          Physical Exam     MENTAL STATUS -  AWAKE / ALERT.  NOT ANXIOUS PRESENTLY. SMILING.  HEENT- NCAT,   SCLERA NON-ICTERIC, CONJUNCTIVA PINK, OP MOIST, NO JVD   LUNGS - normal respirations.  Clear to auscultation  HEART- RRR   ABD -   non-distended  EXT - NO EDEMA OR CYANOSIS  NEURO -alert.  Aphasia.  She will get occasional single word such as 'yes\" or \"no\" out. Follows instructions.    MOTOR EXAM -takes resistance bilaterally.           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 There are stable areas of  encephalomalacia in the left parietal and occipital lobes medially.  Generalized atrophy. There are moderately extensive scattered areas of  decreased density in the white matter likely related to chronic ischemic  gliotic changes.    No hydrocephalus.           Glucose   Date/Time Value Ref Range Status   08/28/2019 0704 92 70 - 130 mg/dL Final   08/27/2019 2044 194 (H) 70 - 130 mg/dL Final   08/27/2019 1631 77 70 - 130 mg/dL Final   08/27/2019 1617 64 (L) 70 - 130 mg/dL Final   08/27/2019 1113 138 (H) 70 - 130 mg/dL Final   08/27/2019 0726 125 70 - 130 mg/dL Final   08/26/2019 1104 131 (H) 70 - 130 mg/dL Final   08/26/2019 0658 147 (H) 70 - 130 mg/dL Final             Results from last 7 days   Lab Units 08/28/19  0711 08/26/19  0658 08/23/19  0631   WBC 10*3/mm3 5.57 5.67 4.21   HEMOGLOBIN g/dL 9.7* 9.7* 9.5*   HEMATOCRIT % 30.5* 31.1* 30.6*   PLATELETS 10*3/mm3 " 254 270 306        Ref. Range 5/22/2019 05:44 5/22/2019 11:34 7/9/2019 08:25 7/18/2019 04:49 7/19/2019 05:05 7/23/2019 05:56 8/1/2019 06:48   Hemoglobin Latest Ref Range: 12.0 - 15.9 g/dL 10.8 (L)   10.6 (L) 8.5 (L) 9.7 (L) 9.3 (L) 7.4 (L)   Hematocrit Latest Ref Range: 34.0 - 46.6 % 35.1   33.4 (L) 26.2 (L) 29.9 (L) 28.6 (L) 23.6 (L)         Results from last 7 days   Lab Units 08/28/19  0711 08/26/19  0658 08/23/19  0631   SODIUM mmol/L 139 141 137   POTASSIUM mmol/L 3.7 3.6 3.6   CHLORIDE mmol/L 102 104 101   CO2 mmol/L 27.9 27.0 28.4   BUN mg/dL 20 20 16   CREATININE mg/dL 0.85 0.72 0.73   CALCIUM mg/dL 9.1 8.5* 9.1   BILIRUBIN mg/dL 0.3  --   --    ALK PHOS U/L 88  --   --    ALT (SGPT) U/L 13  --   --    AST (SGOT) U/L 19  --   --    GLUCOSE mg/dL 76 142* 108*     Results from last 7 days   Lab Units 08/28/19  0711   CHOLESTEROL mg/dL 95   TRIGLYCERIDES mg/dL 65   HDL CHOL mg/dL 40   LDL CHOL mg/dL 42            Ref. Range 8/1/2019 06:47   25 Hydroxy, Vitamin D Latest Ref Range: 30.0 - 100.0 ng/ml 21.8 (L)        Ref. Range 8/1/2019 06:47   Total Cholesterol Latest Ref Range: 0 - 200 mg/dL 105   HDL Cholesterol Latest Ref Range: 40 - 60 mg/dL 40   LDL Cholesterol  Latest Ref Range: 0 - 100 mg/dL 57   VLDL Cholesterol Latest Ref Range: 5 - 40 mg/dL 8   Triglycerides Latest Ref Range: 0 - 150 mg/dL        Name Relationship Specialty Phone Fax Address Order              Eric Matta MD  PCP - General General Practice [phone] [address] [address]     Next Steps: Follow up      Maine Starr APRN   Neurology [phone] [address]  [facility] [address]  [place] [address]     Next Steps: Follow up      Instructions: CALL FOR APPOINTMENT      Greta Swan APRN   Neurosurgery [phone] [address]  [facility] [address]  [facility] [address]     Next Steps: Follow up in 2 month(s)      Instructions: we will arrange           Discharge Medications      New  Medications      Instructions Start Date   acetaminophen 500 MG tablet  Commonly known as:  TYLENOL   1,000 mg, Oral, Every 6 Hours PRN      aluminum-magnesium hydroxide-simethicone 400-400-40 MG/5ML suspension  Commonly known as:  MAALOX MAX   15 mL, Oral, Every 6 Hours PRN      aspirin 325 MG tablet   325 mg, Oral, Daily      atorvastatin 80 MG tablet  Commonly known as:  LIPITOR   80 mg, Oral, Nightly      dextrose 40 % gel  Commonly known as:  GLUTOSE   15 g, Oral, Every 15 Minutes PRN      dextrose 50 % solution  Commonly known as:  D50W   25 g, Intravenous, Every 15 Minutes PRN      glucagon (human recombinant) 1 MG injection  Commonly known as:  GLUCAGEN DIAGNOSTIC   1 mg, Subcutaneous, As Needed      insulin lispro 100 UNIT/ML injection  Commonly known as:  humaLOG   0-7 Units, Subcutaneous, 4 Times Daily With Meals & Nightly      losartan 50 MG tablet 100 mg, hydrochlorothiazide 12.5 MG capsule 12.5 mg   1 dose, Oral, Daily   Start Date:  8/29/2019     melatonin 3 MG tablet   3 mg, Oral, Nightly      nitroglycerin 0.4 MG SL tablet  Commonly known as:  NITROSTAT   0.4 mg, Sublingual, Every 5 Minutes PRN, Max 3 doses in 15 minutes.      pantoprazole 40 MG EC tablet  Commonly known as:  PROTONIX   40 mg, Oral, 2 Times Daily Before Meals      PARoxetine 10 MG tablet  Commonly known as:  PAXIL   10 mg, Oral, Daily   Start Date:  8/29/2019     potassium chloride 20 MEQ packet  Commonly known as:  KLOR-CON   20 mEq, Oral, Daily With Breakfast   Start Date:  8/29/2019     vitamin D 25583 units capsule capsule  Commonly known as:  ERGOCALCIFEROL   50,000 Units, Oral, Every 7 Days, For 4 more doses, then change to cholecalciferol 1,000 units po bid.         Changes to Medications      Instructions Start Date   amLODIPine 5 MG tablet  Commonly known as:  NORVASC  What changed:    · medication strength  · how much to take  · when to take this   5 mg, Oral, 2 Times Daily - RT      glipiZIDE 5 MG tablet  Commonly known  as:  GLUCOTROL  What changed:    · medication strength  · how much to take   5 mg, Oral, 2 Times Daily Before Meals      nebivolol 20 MG tablet  Commonly known as:  BYSTOLIC  What changed:  when to take this   20 mg, Oral, 2 Times Daily         Continue These Medications      Instructions Start Date   apixaban 5 MG tablet tablet  Commonly known as:  ELIQUIS   5 mg, Oral, Every 12 Hours Scheduled      brimonidine 0.2 % ophthalmic solution  Commonly known as:  ALPHAGAN   1 drop, Both Eyes, 2 Times Daily      dorzolamide 2 % ophthalmic solution  Commonly known as:  TRUSOPT   1 drop, Both Eyes, 2 Times Daily      hydrALAZINE 50 MG tablet  Commonly known as:  APRESOLINE   50 mg, Oral, Every 8 Hours Scheduled      metFORMIN 1000 MG tablet  Commonly known as:  GLUCOPHAGE   1,000 mg, Oral, 2 Times Daily With Meals         Stop These Medications    clopidogrel 75 MG tablet  Commonly known as:  PLAVIX            Diet-consistent carb, regular with thins, no straws. She continues to need 1:1 supervision for meals  . She can take meds crushed in puree    Blood glucose check q AC and HS    Recheck CMP-liver enzymes and lipid panel on statin-atorvastatin the first week of October.  Recheck CBC every 2 weeks and BMP every 2 weeks.    >30 on discharge

## 2019-08-28 NOTE — PROGRESS NOTES
Pt is tolerating trials of regular with thins by cup.   - upgrade to regular with thins, no straws. She continues to need 1:1 supervision for meals  . She can take meds crushed in puree.

## 2019-08-28 NOTE — SIGNIFICANT NOTE
08/28/19 7283   Rehab Treatment   Discipline occupational therapist   Reason Treatment Not Performed patient/family declined treatment  (supine in bed, trying to communiate with OT but becoming frustrated unable to redirect at this time, attempted to assist with changing the TV channel but pt still not agreeable.  Pt upset at not being able for therapist to understand her. )   Recommendation   OT - Next Appointment 08/29/19

## 2019-08-28 NOTE — PLAN OF CARE
Problem: Patient Care Overview  Goal: Plan of Care Review   08/28/19 2719   Patient Care Overview   IRF Plan of Care Review progress ongoing, continue   Progress, Functional Goals demonstrating adequate progress   Coping/Psychosocial   Plan of Care Reviewed With patient   OTHER   Outcome Summary Reassessed swallow with trials of regular, mixed, puree and thins via cup. Pt appears safe for regular diet with thin liquids (no straws). She continues to need 1:1 supervision for meals due to mild impulsivity with bite size and rate of feeding.

## 2019-08-28 NOTE — PROGRESS NOTES
Inpatient Rehabilitation Functional Measures Assessment    Functional Measures  KATI Eating:  Branch  [facility] Grooming: Branch  [facility] Bathing:  Branch  [facility] Upper Body Dressing:  Branch  [facility] Lower Body Dressing:  Branch  [facility] Toileting:  [facility] Bladder Management  Level of Assistance:  [place]  Frequency/Number of Accidents this Shift:  [facility] Bowel Management  Level of Assistance: [place]  Frequency/Number of Accidents this Shift: Branch    [facility] Bed/Chair/Wheelchair Transfer:  Activity was not observed.  KATI Toilet Transfer:  [facility] Tub/Shower Transfer:  [place]    Previously Documented Mode of Locomotion at Discharge: Field  KATI Expected Mode of Locomotion at Discharge: [facility] Walk/Wheelchair:  WHEELCHAIR OBSERVATION   Activity was not observed.    WALK OBSERVATION   Activity was not observed.  KATI Stairs:  Activity was not observed.    KATI Comprehension:  Branch  [facility] Expression:  [facility] Social Interaction:  [facility] Problem Solving:  [facility] Memory:  [place]    Therapy Mode Minutes  Occupational Therapy: [place]  Physical Therapy: Individual: 0 minutes.  Speech Language Pathology:  Branch    Signed by: Mira Chadwick, PT

## 2019-08-28 NOTE — PROGRESS NOTES
Inpatient Rehabilitation Functional Measures Assessment    Functional Measures  KATI Eating:  [facility] Grooming: [facility] Bathing:  [facility] Upper Body Dressing:  Branch  [facility] Lower Body Dressing:  [facility] Toileting:  [facility] Bladder Management  Level of Assistance:  [place]  Frequency/Number of Accidents this Shift:  [facility] Bowel Management  Level of Assistance: [place]  Frequency/Number of Accidents this Shift: [facility] Bed/Chair/Wheelchair Transfer:  [facility] Toilet Transfer:  [facility] Tub/Shower Transfer:  [place]    Previously Documented Mode of Locomotion at Discharge: Field  KATI Expected Mode of Locomotion at Discharge: [facility] Walk/Wheelchair:  [facility] Stairs:  [facility] Comprehension:  Auditory comprehension is the usual mode. Patient does not  comprehend complex/abstract information in their primary language without  assistance from a helper. Comprehension Score = 3, Moderate Prompting. Patient  comprehends basic daily needs or ideas 50-74% of the time. Patient requires  moderate/some prompting. No assistive devices were required.  KATI Expression:  Vocal expression is the usual mode. Patient does not express  complex/abstract information in their primary language without a helper.  Expression Score = 2, Maximal Prompting. Patient expresses basic daily needs  25-49% of the time. Patient uses only single words or gestures and requires  maximal/a lot of prompting (most of the time). No assistive devices were  required.  KATI Social Interaction:  Social Interaction Score = 2, Maximal Direction.  Patient interacts appropriately 25-49% of the time. Patient requires maximal/a  lot of direction (most of the time) for the following behavior(s):  KATI Problem Solving:  Patient does not make appropriate decisions in order to  solve complex problems without assistance from a helper. Problem Solving Score =  2, Maximal Direction. Patient makes appropriate decisions in order to  solve  routine problems 25-49% of the time. Patient requires maximal direction for the  following behavior(s):  KATI Memory:  Activity was not observed.    Therapy Mode Minutes  Occupational Therapy: Branch  Physical Therapy: Branch  Speech Language Pathology:  Branch    Signed by: Eryn Yan RN

## 2019-08-28 NOTE — PLAN OF CARE
Problem: Skin Injury Risk (Adult)  Goal: Skin Health and Integrity  Outcome: Ongoing (interventions implemented as appropriate)      Problem: Fall Risk (Adult)  Goal: Absence of Fall  Outcome: Ongoing (interventions implemented as appropriate)      Problem: Patient Care Overview  Goal: Plan of Care Review  Outcome: Ongoing (interventions implemented as appropriate)   08/27/19 1535 08/27/19 2205 08/28/19 0342   Patient Care Overview   IRF Plan of Care Review progress ongoing, continue --  --    Progress, Functional Goals --  --  progress more gradual than expected   Coping/Psychosocial   Plan of Care Reviewed With --  patient --    OTHER   Outcome Summary --  --  Cooperative at HS, took meds crushed with nectar thick OJ. Assist to BR per w/c. Follows commands. Expressive aphasia.     Goal: Individualization and Mutuality  Outcome: Ongoing (interventions implemented as appropriate)    Goal: Discharge Needs Assessment  Outcome: Ongoing (interventions implemented as appropriate)    Goal: Coping Plan  Outcome: Ongoing (interventions implemented as appropriate)      Problem: Stroke (IRF) (Adult)  Goal: Promote Optimal Functional [place]  Outcome: Ongoing (interventions implemented as appropriate)

## 2019-08-28 NOTE — PROGRESS NOTES
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  Nursing reports that she had a good night last night.  She did better with speech therapy today and is advanced to regular solid thin liquid diet.  With OT she was noted to be upset while eating breakfast but not able to tell why.  Later in the morning resting in bed without any new apparent complaint.  She is able to eat seated in the wheelchair with her right hand using a spoon and fork with minimal spilling.  Still with difficulty following instructions completely due to do her aphasia.     History taken from: patient chart RN    Objective     Vital Signs  Temp:  [97.8 °F (36.6 °C)-98.1 °F (36.7 °C)] 97.8 °F (36.6 °C)  Heart Rate:  [66-70] 68  Resp:  [16] 16  BP: (112-140)/(60-68) 133/67    Objective  Physical Exam    MENTAL STATUS -  AWAKE / ALERT.  NOT ANXIOUS PRESENTLY. SMILING.  HEENT- NCAT,   SCLERA NON-ICTERIC, CONJUNCTIVA PINK, OP MOIST, NO JVD   LUNGS - normal respirations.  Clear to auscultation  HEART- RRR   ABD -   non-distended  EXT - NO EDEMA OR CYANOSIS  NEURO -alert.  Aphasia.  She will get occasional single word such as 'yes\" or \"no\" out. Follows instructions.    MOTOR EXAM -takes resistance bilaterally.         Results Review:     I reviewed the patient's new clinical results.     CT HEAD - AUGUST 11, 2019  FINDINGS:  Old appearing lacunar type infarcts are again noted " about the  right thalamus and basal ganglia regions. There are stable areas of  encephalomalacia in the left parietal and occipital lobes medially.  Generalized atrophy. There are moderately extensive scattered areas of  decreased density in the white matter likely related to chronic ischemic  gliotic changes.    No hydrocephalus.    Glucose   Date/Time Value Ref Range Status   08/28/2019 0704 92 70 - 130 mg/dL Final   08/27/2019 2044 194 (H) 70 - 130 mg/dL Final   08/27/2019 1631 77 70 - 130 mg/dL Final   08/27/2019 1617 64 (L) 70 - 130 mg/dL Final   08/27/2019 1113 138 (H) 70 - 130 mg/dL Final   08/27/2019 0726 125 70 - 130 mg/dL Final   08/26/2019 1104 131 (H) 70 - 130 mg/dL Final   08/26/2019 0658 147 (H) 70 - 130 mg/dL Final     Results from last 7 days   Lab Units 08/28/19  0711 08/26/19  0658 08/23/19  0631   WBC 10*3/mm3 5.57 5.67 4.21   HEMOGLOBIN g/dL 9.7* 9.7* 9.5*   HEMATOCRIT % 30.5* 31.1* 30.6*   PLATELETS 10*3/mm3 254 270 306       Ref. Range 5/22/2019 05:44 5/22/2019 11:34 7/9/2019 08:25 7/18/2019 04:49 7/19/2019 05:05 7/23/2019 05:56 8/1/2019 06:48   Hemoglobin Latest Ref Range: 12.0 - 15.9 g/dL 10.8 (L)  10.6 (L) 8.5 (L) 9.7 (L) 9.3 (L) 7.4 (L)   Hematocrit Latest Ref Range: 34.0 - 46.6 % 35.1  33.4 (L) 26.2 (L) 29.9 (L) 28.6 (L) 23.6 (L)     Results from last 7 days   Lab Units 08/28/19  0711 08/26/19 0658 08/23/19  0631   SODIUM mmol/L 139 141 137   POTASSIUM mmol/L 3.7 3.6 3.6   CHLORIDE mmol/L 102 104 101   CO2 mmol/L 27.9 27.0 28.4   BUN mg/dL 20 20 17   CREATININE mg/dL 0.85 0.72 0.73   CALCIUM mg/dL 9.1 8.5* 9.1   GLUCOSE mg/dL 76 142* 108*      Results for NOMAN TEIXEIRA (MRN [id number]) as of 8/28/2019 10:12   Ref. Range 8/1/2019 06:47   Alkaline Phosphatase Latest Ref Range: 39 - 117 U/L 107   Total Protein Latest Ref Range: 6.0 - 8.5 g/dL 5.2 (L)   ALT (SGPT) Latest Ref Range: 1 - 33 U/L 31   AST (SGOT) Latest Ref Range: 1 - 32 U/L 32   Total Bilirubin Latest Ref Range: 0.2 - 1.2  mg/dL 0.3   Albumin Latest Ref Range: 3.50 - 5.20 g/dL 2.90 (L)      Ref. Range 8/1/2019 06:47   25 Hydroxy, Vitamin D Latest Ref Range: 30.0 - 100.0 ng/ml 21.8 (L)       Ref. Range 8/1/2019 06:47   Total Cholesterol Latest Ref Range: 0 - 200 mg/dL 105   HDL Cholesterol Latest Ref Range: 40 - 60 mg/dL 40   LDL Cholesterol  Latest Ref Range: 0 - 100 mg/dL 57   VLDL Cholesterol Latest Ref Range: 5 - 40 mg/dL 8   Triglycerides Latest Ref Range: 0 - 150 mg/dL 40   Medication Review: done  Scheduled Meds:    amLODIPine 5 mg Oral BID - RT   apixaban 5 mg Oral Q12H   aspirin 325 mg Oral Daily   atorvastatin 80 mg Oral Nightly   brimonidine 1 drop Both Eyes BID   dorzolamide 1 drop Both Eyes BID   glipiZIDE 5 mg Oral BID AC   hydrALAZINE 50 mg Oral Q8H   insulin lispro 0-7 Units Subcutaneous 4x Daily With Meals & Nightly   lansoprazole 30 mg Oral BID AC   losartan-HCTZ (HYZAAR) 100-12.5 combo dose  Oral Daily   metFORMIN 1,000 mg Oral BID With Meals   nebivolol 20 mg Oral BID   nystatin 5 mL Swish & Spit 4x Daily   PARoxetine 10 mg Oral Daily   potassium chloride 20 mEq Oral Daily With Breakfast   vitamin D 50,000 Units Oral Q7 Days     Continuous Infusions:   PRN Meds:.•  acetaminophen  •  aluminum-magnesium hydroxide-simethicone  •  dextrose  •  dextrose  •  glucagon (human recombinant)  •  nitroglycerin  •  OLANZapine      Assessment/Plan       Stroke (cerebrum) (CMS/[facility])      Assessment & Plan  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  If add Namenda, will probably discontinue amantadine as she continues alert.  August 11-she has some increased irritability at times.  This may be related to the underlying stroke deficits itself.  She does not appear to have any dysuria, no odor to her urine.  Staff reports that for the most part she is  eating okay.  Will check a follow-up CT of the head to assess for any interval visual changes.  She is on aspirin and Eliquis for stroke prophylaxis.  She had noticeably improved alertness when amantadine was started.  She is readily alert now.  This may be related to natural recovery in terms of her level of arousal at this point.  Current plan is to discontinue the amantadine to see if that helps with her irritability and start  on Namenda for aphasia.  August 12 -  Patient with increased restlessness at times.   Continues aphasia. Not able to identify any complaint.  Appears anxious today. No change on CT head yesterday. Started on Namenda for aphasia on 8/12/19.   August 13- will continue to monitor on recent med adjustments   August 15-She continues with expressive language deficits.    Again appears anxious related to her aphasia and difficulty expressing herself.    She will be able to get occasional single word for the examiner.  She does follow instructions.  The patient was reviewed with .  Discussed adding on an antidepressant with anxiolytic properties -Paxil-as it appears frustration from her aphasia with associated anxiety with the frustration affects her performance.  We will plan on starting Paxil 10 mg daily tomorrow and monitor response.  Reviewed with the patient's  that would not add a short acting anxiolytic (such as a benzodiazepine or Seroquel) as it may affect her cognitive performance.    August 16-started Paxil 10 mg daily  August 19 - Increased restlessness Friday night - ? Related to UTI vs initiation of Paxil. On Cefdinir for GNR pending ID &Sensitivity.  August 20 -urine culture with E. coli ESBL.  Given her aphasia not able to obtain information regarding dysuria.  As the urinalysis was ordered as part of work-up for increased restlessness this weekend, would have to treat as symptomatic although could be asymptomatic bacteriuria.  Will place on ertapenem 1 g IV daily for  "3-5 days.  August 21 - increased restlessness/anxiety today. Labs without any acute change. Blood glucose okay this AM. BP pattern okay. No gross motor changes. Follow on current regimen for now.   Addendum-Patient continued today with  increased restlessness/anxiety/refusing aspects of nursing care. She had had a good day in therapies yesterday. She is calmer now with family present. Did allow IV antibiotic to be infused but has not taken PO meds or eaten dinner yet.   Discussed with patient's /children will need to hold on planned discharge tomorrow given her mood/behavior today.   I do not feel Namenda (started as trial for aphasia) is related as had episodes of restlessness prior to starting, but will discontinue for now so  slate with psychoactive medications. Continue Paxil for now. Will consult Psychiatry for input.  August 22-patient more cooperative today, taking medications and will participate in therapies.  Still perseverates on \"no\".  Still with some anxiety related to her aphasia but less than yesterday.  Seen by psychiatry and to continue with Paxil 10 mg daily.  Also order written for Zyprexa 5 mg IM every 8 hours as needed agitation.  To observe on current regimen.  August 26 - still gets anxious/frustrated by aphasia. Not participate with therapies or eat meal at times. Continues on Paxil. Will ask Psychiatry for any additional thoughts in AM.   August 27-patient received Zyprexa 5 mg IM at about 830 last evening.  Slept last night.  She is not anxious this morning.  Participating with activities.  Will plan to get updated assessment from psychiatry for any adjustment in regimen before discharge to subacute - per facility cannot go on a prn medication. Addendum: per Psychiatry,  recommend giving Paxil opportunity to exert its clinical effect over the next 1-1/2 to 2 weeks.  August 28-she did better last evening per nursing.  Reported to sleep well.  She is received Zyprexa as needed " only once in the past 6 days. Patient reviewed with Psychiatry.  Will discontinue Zyprexa as needed.   Will place on melatonin 3 mg at nighttime scheduled for sleep.  As patient's overall status appears stable and not making any new interventions at this time, allowing Paxil and opportunity to take effect, will look at discharge to subacute [facility] today.  She will need follow-up assessments by the physicians at that facility to adjust her medication - Paxil - as needed.    Aphasia -  August 12 - trial of Namenda  August 16-continue to observe on Namenda 5 mg daily for her aphasia and may possibly titrate up next week  August 20-titrate up on Namenda to 5 mg twice a day  August 22-discontinued Namenda.  Do not feel related to her anxiety/restlessness as it was present prior to starting but discontinued to avoid any additional psychoactive medications that might add to it.  Had not seen any improvement in her aphasia with the very brief trial.    Dysphagia-Cortrak feeding tube discontinued on July 31 and started on puréed/honey thick liquid diet with strategies  August 7-advance to fork mash nectar thick liquid diet, consistent carbohydrate.  August 14 - repeat VFSS to assess for advancing to thin liquids  August 15-Video fluoroscopic swallow study today-mechanical soft, no mixed consistency, nectar thick liquid, water protocol between meals, no straws. May take meds whole in Puree or NTL as tolerated. May have ice chips or sips of water in between meals after oral care per protocol.  August 28 - Pt is tolerating trials of regular with thins by cup.   - upgrade to regular with thins, no straws. She continues to need 1:1 supervision for meals  . She can take meds crushed in puree.    History of multiple bilateral strokes and left central retinal artery occlusion    History of left greater than right internal carotid artery stenosis-status post left internal carotid artery stent July 17, 2019  Follow with  Neurosurgery in office in October with carotid ultrasound    Stroke prophylaxis-aspirin/Eliquis/atorvastatin 80 mg  August 28-recheck liver enzymes and lipid panel today and recheck again in 1 month first of October    History of hypertension -  Hyzaar 100-25. August 4 - Bystolic increased to 20 mg daily to 20 mg bid.  August 8 - BP still runs high. On discharge in May 2019 was on Hyzaar 100-25 mg daily, Bystolic 20 mg daily, amlodipine 10 mg daily, Hydralazine 50 mg q 8 hours.  Will add Hydralazine initially 10 mg po q 8 hours and titrate up as needed.   August 9-systolic blood pressure elevated last night and early this morning but better this afternoon at 122-130.  Continue to follow recent addition of hydralazine and titrate up as needed  August 10-systolic blood pressure 175 this afternoon, range otherwise 122-142.  Will titrate up on hydralazine to 20 mg every 8 hours.  August 11-hypertension overall appears improved.  Will titrate up further to 25 mg every 8 hours.  August 12 - add amlodipine 5 mg daily.   August 14 - titrate up on hydralazine to 50 mg q 8 hours  August 15-blood pressure improved this afternoon with systolic blood pressure in the 120s-follow on current regimen  August 16-blood pressure range 110////59-will continue to monitor on present regimen  August 19 - /74 early AM, later 136/71 - increase amlodipine to 5 mg bid  August 28-blood pressure range 112-140/60-68    History of diabetes mellitus -Lantus/glipizide (home medication metformin 1000 mg twice daily and glipizide 10 mg twice daily)  August 1-blood sugars were low yesterday afternoon after tube feeds discontinued.  Held glipizide last night and this morning and Lantus last night.  Blood sugar elevated at noontime today.  Will receive resume glipizide at a lower dose of 5 mg twice a day with meals.  Continue sliding scale insulin coverage.  She also is on metformin at home.  August 5-add back metformin initially  at 500 mg twice a day and continue glipizide 5 mg twice a day, both one half of previous home dose, titrate up as needed.  August 7-titrate up metformin to 850 mg twice a day.  Add consistent carbohydrate restriction to diet.  August 9-increase metformin to 1000 g twice a day.  August 10-blood glucose 340 last evening but 150-114-178 so far today  August 11-continue to follow blood sugar pattern and may increase glipizide further as current dose glipizide 5 mg twice a day along with metformin 1000 mg twice a day  August 14 - blood glucose  past 24 hours - monitor pattern.  August 15-blood glucose [phone]--186-66-continue to follow pattern.  Will change sliding scale insulin coverage to Humalog at a lower dose.  She was started on the regular insulin sliding scale when she was on tube feeds.  August 16 - recent blood glucose -093-138  August 19 - recent blood glucose [phone] - increase glipizide to 7.5 mg bid  August 21 - refused blood glucose check today. Will decrease glipizide back to 5 mg bid to avoid potential for hypoglycemia until allow blood glucose check.   August 28-recent blood glucose checks [phone] this morning.  Continues on metformin thousand milligrams twice a day and glipizide 5 mg twice a day.  The one low blood sugar yesterday afternoon is not typical and will not make any adjustments today in her regimen but would continue to follow and possibly decrease morning glipizide dose if needed.        Anemia-August 1-hemoglobin 7.4.  Most recent check on July 23 HGB 9.3.  Will recheck hemoglobin this afternoon.  Hemoccult stool.  Iron studies.  Reticulocyte count.  Recheck CBC in the a.m.  Added Protonix.  Patient is on aspirin and Eliquis.  With recent stroke  will look to transfuse packed red blood cell.  August 2-hemoglobin improved 9.0-1 unit packed red blood cells.  Elevated reticulocyte count in response to anemia.  Nursing describes dark stool.  Patient  discussed with gastroenterology.  At this point unable to do endoscopy as on Eliquis and aspirin.  Will treat symptomatically for now with increasing proton pump inhibitor and transfusing as needed.  August 5-anemia improved-hemoglobin 9.8  August 16-hemoglobin unchanged 9.8    DVT prophylaxis-SCDs/anticoagulation    Impulsivity-   Nursing to do re-orientation with the patient.  Room close to the nursing station.    Endocrine-vitamin D deficiency-ergocalciferol 50,000 units weekly x8 weeks added. Vitamin B12 level and TSH checked in late May unremarkable    Urinary tract infection-August 20 -urine culture with E. coli ESBL.  Given her aphasia not able to obtain information regarding dysuria.  As the urinalysis was ordered as part of work-up for increased restlessness this weekend, would have to treat as symptomatic although could be asymptomatic bacteriuria.  Placed on ertapenem 1 g IV daily for 3  days.     Impaired cognition/impaired language, impaired swallow, impaired activity daily living, impaired mobility     REHAB- admit for comprehensive acute inpatient rehabilitation .  This would be an interdisciplinary program with physical therapy 1 hour,  occupational therapy 1 hour, and speech therapy 1 hour, 5 days a week.  Rehabilitation nursing for carryover, monitoring of cardiopulmonary and neurologic   status, bowel and bladder, and skin  Ongoing physician follow-up.  Weekly team conferences.  Goals are indeterminant.   Rehabilitation prognosis determined.  Medical prognosis determined.  Estimated length of stay is indeterminate.    TEAM CONF - AUGUST 6- TRANFERS CTG. GAIT UP  FEET CTG-MIN ASSIST. UBD MIN. LBD MOD. BATH MOD. SEVERE APHASIA. INCREASED RESISTANCE TO PARTICIPATE AT TIMES.   PUREE/HTL.  GOOD PO INTAKE. ADJUSTING MEDS FOR DIABETES MELLITUS.  BLADDER CONTINENT/INCONTIENT.   ELOS- 2 WEEKS.     TEAM CONF - AUGUST 13 - DID GREAT WITH PT ON Saturday, FOLLOWING COMMANDS AND BATTING A BALL WITH  ANOTHER PATIENT. YESTERDAY, VERY FRUSTRATED AND IRRITABLE.  FRUSTRATED BY APHASIA, TRYING TO TELL STAFF SOMETHING. WILL HOLD ON TO OBJECTS, REPEAT SINGLE WORD.   BED CTG. 4 STAIRS CTG.  GAIT 220 FEET CTG-SBA NO DEVICE.  TOILET TRANSFERS CTG-MIN.  GROOMING MIN.  UBD MIN ASSIST FOR BRA, LBD CTG-MIN. BATH CTG-MIN. COORDINATION RUE BETTER.  DYSPHAGIA - IMPROVED - FORK MASH, NTL. DO NOT FEEL SHE WOULD TOLERATE E-STIM. RECEPTIVE LANGUAGE IMPROVED SLIGHTLY , POINTING TO OBJECTS. EXPRESSIVELY PERSEVERATIVE ON A SINGLE WORD, TRIES TO USE PICTURE BOARD TO COMMUNICATE. YES/NO NOT ACCURATE.  CONTINENT BOWEL AND BLADDER, TIMED VOIDS. SKIN INTACT. TYPICALLY GOOD INTAKE.  ELOS - 1.5 WEEKS    AUGUST 14 - In therapies - In OT, increased participation noted with  present   Transfers SBA/CTG  Walked from therapy gym to room without AD SBA and vc's for directions back to the room   300' outdoors on sidewalk, incline; 300', 180', 100' up on rehab unit. Pt was able to find her room when she got close to it  Bathing, dressing, grooming min assist. Toileting moderate assist.  Pt participated in using R hand to manipualte round pegs into peg board by color with MIN difficutly once pt caught on to the task. with 3D block recreation with R hand with 100% color match, 80% accuracy with block recreation  With SLP, patient was not able to effectively communicate her wants/needs but refused to go to therapy office by planting her heels while rolling in wc down the [place]; tx session completed in her room     August 15-The patient was reviewed with .  Discussed adding on an antidepressant with anxiolytic properties -Paxil-as it appears frustration from her aphasia with associated anxiety with the frustration affects her performance.  We will plan on starting Paxil 10 mg daily tomorrow and monitor response.  Reviewed with the patient's  that would not add a short acting anxiolytic (such as a benzodiazepine or Seroquel) as it may  affect her cognitive performance.    August 16-Patient was reviewed with her daughter today including rehabilitation goals, discharge planning, and recent medication adjustment to try to help with her anxiety/frustration with her aphasia.  Reviewed with the daughter that on discussion with the team is felt that she would do better with her functional status/aphasia/anxiety if able to be discharged to home environment with more frequent interactions but if that is not feasible with the need to look at subacute options.    TEAM CONF - AUGUST 20 - GAIT CTG- FEET. NO DEVICE, WALKS TO AND FROM GYM.  ADLS CTG WITH CUING.  PLAYED ENTIRE GAME OF CHECKERS YESTERDAY. VARIABLE PERFORMANCE WITH SPEECH THERAPY 20-90% MATCHING WORDS TO PICTURES.    MECH SOFT, GROUND MEAT, NECTAR THICK LIQUIDS.   TYPICALLY CONTINENT BLADDER BUT INCONTINENT BOWEL. DID FINE LAST EVENING PER SISTER AND DID NOT TRY TO GET UP- WILL DISCONTINUE SITTER. IN ROOM CLOSE TO NURSING STATION.   ELOS- FAMILY CONF LAST WEEK- WILL WORK TOWARD SUBACUTE PLACEMENT     August 23-upper body dressing set up, lower body dressing min assist.  Grooming supervision.  Transfers standby assist.  Ambulated 220 feet without a device contact-guard standby assist.  She spent in therapy this morning but not with physical therapy this afternoon    TEAM CONF - AUGUST 27 - CONTINUES WITH APHASIA. WORKED ON MELODIC INTONATION. BED SBA. 4 STAIRS CTG. GAIT 300 FEET SBA. TOILET TRANSFERS SBA. SHOWER TRANSFERS SBA. VOIDING ON OWN. UBD MIN ASSIST. LBD CTG-MIN. BATH CTG. DIET - MECH SOFT, GROUND MEATS, NECTAR THICK LIQUIDS. NOT ABLE TO MANAGE AT HOME IN SHORT TERM. WILL ASK PSYCHIATRY SEE IN FOLLOW UP FOR ANY FINAL ADJUSTMENTS IN REGIMEN BEFORE DISCHARGE TO SUBACUTE.  .  ELOS- DISPOSITION PENDING.     August 28 -  As patient's overall status appears stable and not making any new interventions at this time, allowing Paxil and opportunity to take effect, will look at discharge to  subacute at [facility] today.  She will need follow-up assessments by the physicians at that facility to adjust her medication as needed.         Barrier to home discharge includes  Impaired cognitive-linguistic skills - work on receptive and expressive language and cognition.       Ajit Howard MD  08/28/19  10:13 AM    Time:      >35 minutes with > 50% face-to-face / floor time / coordination of care

## 2019-08-28 NOTE — THERAPY TREATMENT NOTE
Inpatient Rehabilitation - Speech Language Pathology   Swallow Treatment Note [facility]     Patient Name: Darby Workman  : 1945  MRN: [id number]  Today's Date: 2019               Admit Date: 2019    Visit Dx:    No diagnosis found.  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)   • Right-sided headache   • Acute ischemic stroke (CMS/HCC)   • Embolic stroke (CMS/HCC)   • Cerebral infarction due to stenosis of left carotid artery (CMS/HCC)   • Anticoagulated by anticoagulation treatment   • Stroke (cerebrum) (CMS/HCC)       Therapy Treatment  Rehabilitation Treatment Summary     Row Name                Wound 19 1015 Left neck incision    Wound - Properties Group Date first assessed: 19 [LD] Time first assessed: 101 [LD] Side: Left [LD] Location: neck [LD] Type: incision [LD] Recorded by:  [LD] Martha Foster RN 19 1015      User Key  (r) = Recorded By, (t) = Taken By, (c) = Cosigned By    Initials Name Effective Dates Discipline    LD Martha Foster RN 16 -  Nurse          Outcome Summary         SLP GOALS     Row Name 19 1430 19 0830 19 1230       Oral Nutrition/Hydration Goal 1 (SLP)    Barriers (Oral Nutrition/Hydration Goal 1, SLP)  --  Pt observed with trials of regular, mixed, puree and thins by cup (refused thins by straw). No overt s/s of aspiration or penetration or penetration observed with any consistency. Trace oral residue observed with mixed. Recommend change diet to regular with thins (no straws), 1:1 supervision for meals.  -AL  Pt exhibited no s/s of aspiration or penetration in 5/5 trials of water via cup. Plan to observe again with meal tomorrow prior to upgrading diet.  -AL    Progress/Outcomes (Oral Nutrition/Hydration Goal 1, SLP)  --  good progress toward  "goal  -AL  good progress toward goal  -AL       Words/Phrases/Sentences Goal 1 (SLP)    Progress/Outcomes (Identify Objects and Pictures Goal 1, SLP)  good progress toward goal  -AL  --  --    Comment (Words/Phrases/Sentences Goal 1, SLP)  Pt counted 1-10 with 50-70% accuracy in unison with clinician. She stated 5/5 functional phrases using melodic intonation with MAX Cues. She produced 4/5 one-word phrase completions (opposites) in unison with clinician and with written cues. She did not sing unison with clinician today; stated \"no.\"  -AL  --  --       Comprehend Questions Goal 1 (SLP)    Progress/Outcomes (Comprehend Questions Goal 1, SLP)  --  --  good progress toward goal  -AL    Comment (Comprehend Questions Goal 1, SLP)  --  --  Personal yes/no questions: 100% with NO cues. Met x1.  -AL       Word Retrieval Skills Goal 1 (SLP)    Progress/Outcomes (Word Retrieval Goal 1, SLP)  --  --  good progress toward goal  -AL    Comment (Word Retrieval Goal 1, SLP)  --  --  Pt stated her first and last name and her 's name with MAX cues. She approximated her daughter's name with MAX cues. Using melodic intonation technique, pt produced 1/5 funtional phrases with MIN cues and 5/5 with MAX cues. Confrontation naming of basic objects: 20% with NO cues, 40% with MOD cues, 100% with MAX cues; apraxia impacted task. Pt counted 1-10 with 20% accuracy with MIN cues, 80% with MAX cues. She stated RAJAT with 100% accuracy with MAX cues.  -AL       Word Retrieval Skills Goal 2 (SLP)    Progress/Outcomes (Word Retrieval Goal 2, SLP)  --  good progress toward goal  -AL  --    Comment (Word Retrieval Goal 2, SLP)  --  Pt produced 2/3 functional phrases using melodic intonation with MAX cues; approximated the 3rd phrase.   -AL  --    Row Name 08/27/19 1100 08/26/19 1100          Oral Nutrition/Hydration Goal 1 (SLP)    Barriers (Oral Nutrition/Hydration Goal 1, SLP)  Pt observed with breakfast meal of mechanical soft/no mixed and " "thin liquid (water). She refused NTL this AM. No overt s/s of aspiration or penetration observed with any consistency. Minimal right labial residue observed, requiring cues to clear. No oral residue observed. Plan to observe again with meal of mechanical soft/ no mixed with thins prior to upgrading.  -AL  --     Progress/Outcomes (Oral Nutrition/Hydration Goal 1, SLP)  good progress toward goal  -AL  --        Word Retrieval Skills Goal 1 (SLP)    Comment (Word Retrieval Goal 1, SLP)  Pt was unable to state functions of objects despite MAX cues; however, appropriate attempt observed x1.  She sang Amazing Angie in unison with clinician with ~50% accuracy when cued to watch clinician's mouth.  -AL  Pt produced ~50% of \"Our Father\" in unison with clinician (pt brought rosary to therapy). She was able to sing \"Amazing Angie\" in unison with clinician with 75% accuracy with MAX cues; benefits from cues to look at SLP face while singing. She produced 3 functional phrases and her name with melodic intonation technique and MAX cues. In supported communication, she communicated her number of kids with MOD-MAX cues (benefited from written choices).  -AL        Graphic Expression of Words Goal 1 (SLP)    Progress/Outcomes (Graphic Expression of Single Words Goal 1, SLP)  good progress toward goal  -AL  --     Comment (Graphic Expression of Single Words Goal 1, SLP)  Pt copied her full name with NO cues. She was unable to copy her 's name. Appeared to attempt to write her daughter's name, but could not be cued to accuracy.  -AL  --       User Key  (r) = Recorded By, (t) = Taken By, (c) = Cosigned By    Initials Name Provider Type    Lela Holley, SLP Speech and Language Pathologist          EDUCATION  The patient has been educated in the following areas:   Dysphagia (Swallowing Impairment).    SLP Recommendation and Plan                                Time Calculation:   Time Calculation- SLP     Row Name 08/28/19 1514 " 08/28/19 1248          Time Calculation- SLP    SLP Start Time  1430  -AL  0830  -AL     SLP Stop Time  1500  -AL  0900  -AL     SLP Time Calculation (min)  30 min  -AL  30 min  -AL       User Key  (r) = Recorded By, (t) = Taken By, (c) = Cosigned By    Initials Name Provider Type    Lela Holley SLP Speech and Language Pathologist          Therapy Charges for Today     Code Description Service Date Service Provider Modifiers Qty    30304074465 HC ST TREATMENT SWALLOW 1 8/27/2019 Lela Bueno SLP GN 1    89800840797 HC ST TREATMENT SPEECH 1 8/27/2019 Lela Bueno, SLP GN 1    [id number] HC ST TREATMENT SPEECH 1 8/27/2019 Lela Bueno SLP GN 2    [id number] HC ST TREATMENT SWALLOW 2 8/28/2019 Lela Bueno, SLP GN 1    [id number] HC ST TREATMENT SPEECH 2 8/28/2019 Lela Bueno, SLP GN 1                 WHIT Tavarez  8/28/2019

## 2019-08-28 NOTE — PROGRESS NOTES
Inpatient Rehabilitation Plan of Care Note    Plan of Care  Care Plan Reviewed - No updates at this time.    Psychosocial    Performed Intervention(s)  Assist patient to express needs and concerns  calm enviroment      Safety    Performed Intervention(s)  Bed alarm, wc alarm  Safety rounds  Items within reach      Body Systems    Performed Intervention(s)  Daily skin inspection      Sphincter Control    Performed Intervention(s)  Monitor intake and output  Encourage fluid intake  Elimination schedule  contact isolation    Signed by: Di Saldaña RN

## 2019-08-29 ENCOUNTER — HOSPITAL ENCOUNTER (INPATIENT)
Facility: HOSPITAL | Age: 74
LOS: 15 days | Discharge: NURSING, REHAB OR LONG-TERM CARE | End: 2019-09-13
Attending: SPECIALIST | Admitting: SPECIALIST

## 2019-08-29 VITALS
OXYGEN SATURATION: 99 % | TEMPERATURE: 97.6 F | HEART RATE: 57 BPM | BODY MASS INDEX: 22.36 KG/M2 | DIASTOLIC BLOOD PRESSURE: 81 MMHG | WEIGHT: 134.2 LBS | RESPIRATION RATE: 16 BRPM | HEIGHT: 65 IN | SYSTOLIC BLOOD PRESSURE: 176 MMHG

## 2019-08-29 PROBLEM — F34.1 DYSTHYMIC DISORDER: Status: ACTIVE | Noted: 2019-08-29

## 2019-08-29 LAB
GLUCOSE BLDC GLUCOMTR-MCNC: 100 MG/DL (ref 70–130)
GLUCOSE BLDC GLUCOMTR-MCNC: 125 MG/DL (ref 70–130)
GLUCOSE BLDC GLUCOMTR-MCNC: 153 MG/DL (ref 70–130)
GLUCOSE BLDC GLUCOMTR-MCNC: 57 MG/DL (ref 70–130)
GLUCOSE BLDC GLUCOMTR-MCNC: 69 MG/DL (ref 70–130)
GLUCOSE BLDC GLUCOMTR-MCNC: 98 MG/DL (ref 70–130)
T4 FREE SERPL-MCNC: 1.82 NG/DL (ref 0.93–1.7)
TSH SERPL DL<=0.05 MIU/L-ACNC: 0.69 UIU/ML (ref 0.27–4.2)

## 2019-08-29 PROCEDURE — 82962 GLUCOSE BLOOD TEST: CPT

## 2019-08-29 PROCEDURE — 90791 PSYCH DIAGNOSTIC EVALUATION: CPT

## 2019-08-29 PROCEDURE — 92507 TX SP LANG VOICE COMM INDIV: CPT

## 2019-08-29 RX ORDER — NITROGLYCERIN 0.4 MG/1
0.4 TABLET SUBLINGUAL
Status: DISCONTINUED | OUTPATIENT
Start: 2019-08-29 | End: 2019-08-29

## 2019-08-29 RX ORDER — ATORVASTATIN CALCIUM 80 MG/1
80 TABLET, FILM COATED ORAL NIGHTLY
Status: DISCONTINUED | OUTPATIENT
Start: 2019-08-29 | End: 2019-09-13 | Stop reason: HOSPADM

## 2019-08-29 RX ORDER — ASPIRIN 325 MG
325 TABLET ORAL DAILY
Status: CANCELLED | OUTPATIENT
Start: 2019-08-30

## 2019-08-29 RX ORDER — DORZOLAMIDE HCL 20 MG/ML
1 SOLUTION/ DROPS OPHTHALMIC 2 TIMES DAILY
Status: DISCONTINUED | OUTPATIENT
Start: 2019-08-29 | End: 2019-09-13 | Stop reason: HOSPADM

## 2019-08-29 RX ORDER — ERGOCALCIFEROL 1.25 MG/1
50000 CAPSULE ORAL
Status: DISCONTINUED | OUTPATIENT
Start: 2019-09-05 | End: 2019-09-13 | Stop reason: HOSPADM

## 2019-08-29 RX ORDER — DEXTROSE MONOHYDRATE 25 G/50ML
25 INJECTION, SOLUTION INTRAVENOUS
Status: DISCONTINUED | OUTPATIENT
Start: 2019-08-29 | End: 2019-08-29

## 2019-08-29 RX ORDER — PANTOPRAZOLE SODIUM 40 MG/1
40 TABLET, DELAYED RELEASE ORAL
Status: DISCONTINUED | OUTPATIENT
Start: 2019-08-29 | End: 2019-09-13 | Stop reason: HOSPADM

## 2019-08-29 RX ORDER — UREA 10 %
3 LOTION (ML) TOPICAL NIGHTLY
Status: DISCONTINUED | OUTPATIENT
Start: 2019-08-29 | End: 2019-09-13 | Stop reason: HOSPADM

## 2019-08-29 RX ORDER — OLANZAPINE 10 MG/1
5 INJECTION, POWDER, LYOPHILIZED, FOR SOLUTION INTRAMUSCULAR EVERY 8 HOURS PRN
Start: 2019-08-29 | End: 2019-09-13 | Stop reason: HOSPADM

## 2019-08-29 RX ORDER — NICOTINE POLACRILEX 4 MG
15 LOZENGE BUCCAL
Status: DISCONTINUED | OUTPATIENT
Start: 2019-08-29 | End: 2019-08-29

## 2019-08-29 RX ORDER — DORZOLAMIDE HCL 20 MG/ML
1 SOLUTION/ DROPS OPHTHALMIC 2 TIMES DAILY
Status: CANCELLED | OUTPATIENT
Start: 2019-08-29

## 2019-08-29 RX ORDER — ERGOCALCIFEROL 1.25 MG/1
50000 CAPSULE ORAL
Status: CANCELLED | OUTPATIENT
Start: 2019-09-05 | End: 2019-09-26

## 2019-08-29 RX ORDER — POTASSIUM CHLORIDE 1.5 G/1.77G
20 POWDER, FOR SOLUTION ORAL
Status: CANCELLED | OUTPATIENT
Start: 2019-08-30

## 2019-08-29 RX ORDER — NICOTINE POLACRILEX 4 MG
15 LOZENGE BUCCAL
Status: DISCONTINUED | OUTPATIENT
Start: 2019-08-29 | End: 2019-09-13 | Stop reason: HOSPADM

## 2019-08-29 RX ORDER — ASPIRIN 325 MG
325 TABLET ORAL DAILY
Status: DISCONTINUED | OUTPATIENT
Start: 2019-08-30 | End: 2019-09-13 | Stop reason: HOSPADM

## 2019-08-29 RX ORDER — ALUMINA, MAGNESIA, AND SIMETHICONE 2400; 2400; 240 MG/30ML; MG/30ML; MG/30ML
15 SUSPENSION ORAL EVERY 6 HOURS PRN
Status: CANCELLED | OUTPATIENT
Start: 2019-08-29

## 2019-08-29 RX ORDER — UREA 10 %
3 LOTION (ML) TOPICAL NIGHTLY
Status: CANCELLED | OUTPATIENT
Start: 2019-08-29

## 2019-08-29 RX ORDER — NITROGLYCERIN 0.4 MG/1
0.4 TABLET SUBLINGUAL
Status: CANCELLED | OUTPATIENT
Start: 2019-08-29

## 2019-08-29 RX ORDER — CLOPIDOGREL BISULFATE 75 MG/1
75 TABLET ORAL DAILY
Status: DISCONTINUED | OUTPATIENT
Start: 2019-08-30 | End: 2019-09-13 | Stop reason: HOSPADM

## 2019-08-29 RX ORDER — BRIMONIDINE TARTRATE 2 MG/ML
1 SOLUTION/ DROPS OPHTHALMIC 2 TIMES DAILY
Status: DISCONTINUED | OUTPATIENT
Start: 2019-08-29 | End: 2019-09-13 | Stop reason: HOSPADM

## 2019-08-29 RX ORDER — PAROXETINE 10 MG/1
10 TABLET, FILM COATED ORAL DAILY
Status: CANCELLED | OUTPATIENT
Start: 2019-08-30

## 2019-08-29 RX ORDER — DEXTROSE MONOHYDRATE 25 G/50ML
25 INJECTION, SOLUTION INTRAVENOUS
Status: CANCELLED | OUTPATIENT
Start: 2019-08-29

## 2019-08-29 RX ORDER — BRIMONIDINE TARTRATE 2 MG/ML
1 SOLUTION/ DROPS OPHTHALMIC 2 TIMES DAILY
Status: CANCELLED | OUTPATIENT
Start: 2019-08-29

## 2019-08-29 RX ORDER — ALUMINA, MAGNESIA, AND SIMETHICONE 2400; 2400; 240 MG/30ML; MG/30ML; MG/30ML
15 SUSPENSION ORAL EVERY 6 HOURS PRN
Status: DISCONTINUED | OUTPATIENT
Start: 2019-08-29 | End: 2019-09-13 | Stop reason: HOSPADM

## 2019-08-29 RX ORDER — ATORVASTATIN CALCIUM 80 MG/1
80 TABLET, FILM COATED ORAL NIGHTLY
Status: CANCELLED | OUTPATIENT
Start: 2019-08-29

## 2019-08-29 RX ORDER — ACETAMINOPHEN 500 MG
1000 TABLET ORAL EVERY 6 HOURS PRN
Status: CANCELLED | OUTPATIENT
Start: 2019-08-29

## 2019-08-29 RX ORDER — AMLODIPINE BESYLATE 5 MG/1
5 TABLET ORAL
Status: CANCELLED | OUTPATIENT
Start: 2019-08-29

## 2019-08-29 RX ORDER — HYDRALAZINE HYDROCHLORIDE 50 MG/1
50 TABLET, FILM COATED ORAL EVERY 8 HOURS SCHEDULED
Status: DISCONTINUED | OUTPATIENT
Start: 2019-08-29 | End: 2019-09-04

## 2019-08-29 RX ORDER — NEBIVOLOL 10 MG/1
20 TABLET ORAL DAILY
Status: DISCONTINUED | OUTPATIENT
Start: 2019-08-30 | End: 2019-09-13 | Stop reason: HOSPADM

## 2019-08-29 RX ORDER — HYDRALAZINE HYDROCHLORIDE 50 MG/1
50 TABLET, FILM COATED ORAL EVERY 8 HOURS SCHEDULED
Status: CANCELLED | OUTPATIENT
Start: 2019-08-29

## 2019-08-29 RX ORDER — NEBIVOLOL 10 MG/1
20 TABLET ORAL 2 TIMES DAILY
Status: CANCELLED | OUTPATIENT
Start: 2019-08-29

## 2019-08-29 RX ORDER — ERGOCALCIFEROL 1.25 MG/1
50000 CAPSULE ORAL
Qty: 3 CAPSULE | Refills: 0
Start: 2019-09-05 | End: 2019-09-20

## 2019-08-29 RX ORDER — POTASSIUM CHLORIDE 1.5 G/1.77G
20 POWDER, FOR SOLUTION ORAL
Status: DISCONTINUED | OUTPATIENT
Start: 2019-08-30 | End: 2019-09-11 | Stop reason: CLARIF

## 2019-08-29 RX ORDER — PAROXETINE 10 MG/1
10 TABLET, FILM COATED ORAL DAILY
Status: DISCONTINUED | OUTPATIENT
Start: 2019-08-30 | End: 2019-09-13 | Stop reason: HOSPADM

## 2019-08-29 RX ORDER — NITROGLYCERIN 0.4 MG/1
0.4 TABLET SUBLINGUAL
Status: DISCONTINUED | OUTPATIENT
Start: 2019-08-29 | End: 2019-09-13 | Stop reason: HOSPADM

## 2019-08-29 RX ORDER — OLANZAPINE 10 MG/1
5 INJECTION, POWDER, LYOPHILIZED, FOR SOLUTION INTRAMUSCULAR EVERY 8 HOURS PRN
Status: CANCELLED | OUTPATIENT
Start: 2019-08-29

## 2019-08-29 RX ORDER — OLANZAPINE 10 MG/1
5 INJECTION, POWDER, LYOPHILIZED, FOR SOLUTION INTRAMUSCULAR EVERY 8 HOURS PRN
Status: DISCONTINUED | OUTPATIENT
Start: 2019-08-29 | End: 2019-08-29 | Stop reason: HOSPADM

## 2019-08-29 RX ORDER — GLIPIZIDE 5 MG/1
5 TABLET ORAL
Status: CANCELLED | OUTPATIENT
Start: 2019-08-29

## 2019-08-29 RX ORDER — OLANZAPINE 10 MG/1
5 INJECTION, POWDER, LYOPHILIZED, FOR SOLUTION INTRAMUSCULAR EVERY 8 HOURS PRN
Status: DISCONTINUED | OUTPATIENT
Start: 2019-08-29 | End: 2019-09-05

## 2019-08-29 RX ORDER — DEXTROSE MONOHYDRATE 25 G/50ML
25 INJECTION, SOLUTION INTRAVENOUS
Status: DISCONTINUED | OUTPATIENT
Start: 2019-08-29 | End: 2019-09-13 | Stop reason: HOSPADM

## 2019-08-29 RX ORDER — PANTOPRAZOLE SODIUM 40 MG/1
40 TABLET, DELAYED RELEASE ORAL
Status: CANCELLED | OUTPATIENT
Start: 2019-08-29

## 2019-08-29 RX ORDER — ERGOCALCIFEROL 1.25 MG/1
50000 CAPSULE ORAL
Status: DISCONTINUED | OUTPATIENT
Start: 2019-09-05 | End: 2019-08-29 | Stop reason: HOSPADM

## 2019-08-29 RX ORDER — AMLODIPINE BESYLATE 5 MG/1
5 TABLET ORAL 2 TIMES DAILY
Status: DISCONTINUED | OUTPATIENT
Start: 2019-08-29 | End: 2019-09-04

## 2019-08-29 RX ORDER — ACETAMINOPHEN 500 MG
1000 TABLET ORAL EVERY 6 HOURS PRN
Status: DISCONTINUED | OUTPATIENT
Start: 2019-08-29 | End: 2019-09-13 | Stop reason: HOSPADM

## 2019-08-29 RX ORDER — NICOTINE POLACRILEX 4 MG
15 LOZENGE BUCCAL
Status: CANCELLED | OUTPATIENT
Start: 2019-08-29

## 2019-08-29 RX ORDER — GLIPIZIDE 5 MG/1
5 TABLET ORAL
Status: DISCONTINUED | OUTPATIENT
Start: 2019-08-29 | End: 2019-09-07

## 2019-08-29 RX ADMIN — APIXABAN 5 MG: 5 TABLET, FILM COATED ORAL at 21:45

## 2019-08-29 RX ADMIN — HYDRALAZINE HYDROCHLORIDE 50 MG: 50 TABLET, FILM COATED ORAL at 08:12

## 2019-08-29 RX ADMIN — DORZOLAMIDE HYDROCHLORIDE 1 DROP: 20 SOLUTION/ DROPS OPHTHALMIC at 21:45

## 2019-08-29 RX ADMIN — AMLODIPINE BESYLATE 5 MG: 5 TABLET ORAL at 21:46

## 2019-08-29 RX ADMIN — HYDRALAZINE HYDROCHLORIDE 50 MG: 50 TABLET, FILM COATED ORAL at 15:07

## 2019-08-29 RX ADMIN — PANTOPRAZOLE SODIUM 40 MG: 40 TABLET, DELAYED RELEASE ORAL at 07:58

## 2019-08-29 RX ADMIN — AMLODIPINE BESYLATE 5 MG: 5 TABLET ORAL at 07:59

## 2019-08-29 RX ADMIN — Medication 3 MG: at 21:45

## 2019-08-29 RX ADMIN — PAROXETINE HYDROCHLORIDE 10 MG: 10 TABLET, FILM COATED ORAL at 07:58

## 2019-08-29 RX ADMIN — NEBIVOLOL HYDROCHLORIDE 20 MG: 10 TABLET ORAL at 07:58

## 2019-08-29 RX ADMIN — PANTOPRAZOLE SODIUM 40 MG: 40 TABLET, DELAYED RELEASE ORAL at 17:50

## 2019-08-29 RX ADMIN — ASPIRIN 325 MG: 325 TABLET ORAL at 07:59

## 2019-08-29 RX ADMIN — METFORMIN HYDROCHLORIDE 1000 MG: 1000 TABLET ORAL at 17:49

## 2019-08-29 RX ADMIN — APIXABAN 5 MG: 5 TABLET, FILM COATED ORAL at 07:59

## 2019-08-29 RX ADMIN — BRIMONIDINE TARTRATE 1 DROP: 2 SOLUTION OPHTHALMIC at 21:44

## 2019-08-29 RX ADMIN — ATORVASTATIN CALCIUM 80 MG: 80 TABLET, FILM COATED ORAL at 21:46

## 2019-08-29 RX ADMIN — POTASSIUM CHLORIDE 20 MEQ: 1.5 POWDER, FOR SOLUTION ORAL at 07:58

## 2019-08-29 RX ADMIN — METFORMIN HYDROCHLORIDE 1000 MG: 1000 TABLET ORAL at 07:58

## 2019-08-29 RX ADMIN — GLIPIZIDE 5 MG: 5 TABLET ORAL at 17:50

## 2019-08-29 RX ADMIN — LOSARTAN POTASSIUM: 50 TABLET, FILM COATED ORAL at 07:58

## 2019-08-29 RX ADMIN — OLANZAPINE 5 MG: 10 INJECTION, POWDER, FOR SOLUTION INTRAMUSCULAR at 06:32

## 2019-08-29 NOTE — PROGRESS NOTES
Inpatient Rehabilitation Functional Measures Assessment    Functional Measures  KATI Eating:  [facility] Grooming: [facility] Bathing:  [facility] Upper Body Dressing:  [facility] Lower Body Dressing:  [facility] Toileting:  [facility] Bladder Management  Level of Assistance:  [place]  Frequency/Number of Accidents this Shift:  [facility] Bowel Management  Level of Assistance: [place]  Frequency/Number of Accidents this Shift: [facility] Bed/Chair/Wheelchair Transfer:  [facility] Toilet Transfer:  [facility] Tub/Shower Transfer:  [place]    Previously Documented Mode of Locomotion at Discharge: Field  KATI Expected Mode of Locomotion at Discharge: [facility] Walk/Wheelchair:  [facility] Stairs:  [facility] Comprehension:  Auditory comprehension is the usual mode. Patient does not  comprehend complex/abstract information in their primary language without  assistance from a helper. Comprehension Score = 2, Maximal Prompting. Patient  comprehends basic daily needs 25-49% of the time. Patient understands simple  information via single words or gestures. Requires maximal/a lot of prompting  (most of the time). No assistive devices were required.  KATI Expression:  Vocal expression is the usual mode. Patient does not express  complex/abstract information in their primary language without a helper.  Expression Score = 2, Maximal Prompting. Patient expresses basic daily needs  25-49% of the time. Patient uses only single words or gestures and requires  maximal/a lot of prompting (most of the time). No assistive devices were  required.  KATI Social Interaction:  Social Interaction Score = 2, Maximal Direction.  Patient interacts appropriately 25-49% of the time. Patient requires maximal/a  lot of direction (most of the time) for the following behavior(s):  Non-cooperative.  KATI Problem Solving:  Activity was not observed.  KATI Memory:  Activity was not observed.    Therapy Mode Minutes  Occupational Therapy:  [facility]  Physical Therapy: [facility]  Speech Language Pathology:  [facility]    Signed by: Jenna Corona RN

## 2019-08-29 NOTE — PROGRESS NOTES
Inpatient Rehabilitation Plan of Care Note    Plan of Care  Care Plan Reviewed - Updates as Follows    Psychosocial    Performed Intervention(s)  calm enviroment      Safety    Performed Intervention(s)  Bed alarm, wc alarm  Safety rounds  Items within reach      Sphincter Control    Performed Intervention(s)  Monitor intake and output  Encourage fluid intake  Elimination schedule  contact isolation    Signed by: Trupti Viramontes RN

## 2019-08-29 NOTE — PROGRESS NOTES
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  She is unable to identify any focal complaint.  On discussion with staff her strength bilaterally appears the same.   She continues with restlessness, and has been more resistant to care.  She is been pushing staff away or hanging onto staff.  Not taking her medications consistently.  Did not eat breakfast.  She will put her foot on the door frame when staff is trying to move out of the room in her wheelchair.  She received Zyprexa 5 mg IM on this morning without any change in her mood/behavior.  Patient has been reviewed by the psychiatric service and plan is to transfer to the CMU unit for further assessment and management with adjustment in her medications to help with her mood/behavior.  Discussed with the patient's  and he is in agreement with this plan.  She will transferred to the CMU today.       History taken from: patient chart RN    Objective     Vital Signs  Resp:  [16] 16  BP: (105-158)/(56-70) 158/70    Objective  Physical Exam    MENTAL STATUS -  AWAKE / ALERT.    Presently with sitter.  Holding onto the centers arm.  HEENT- NCAT,   SCLERA NON-ICTERIC, CONJUNCTIVA PINK, OP MOIST, NO JVD   LUNGS - normal respirations.  Clear to auscultation  HEART- RRR   ABD -   non-distended  EXT - NO EDEMA OR CYANOSIS  NEURO -alert.  Aphasia-unchanged.  No volitional verbalization during  the visit.  On questioning, did not appear to have any headache.  MOTOR EXAM -grossly unchanged but did not participate with manual muscle testing         Results Review:     I reviewed the patient's new clinical results.     CT HEAD - AUGUST 11, 2019  FINDINGS:  Old appearing lacunar type infarcts are again noted about the  right thalamus and basal ganglia regions. There are stable areas of  encephalomalacia in the left parietal and occipital lobes medially.  Generalized atrophy. There are moderately extensive scattered areas of  decreased density in the white matter likely related to chronic ischemic  gliotic changes.    No hydrocephalus.    Glucose   Date/Time Value Ref Range Status   08/29/2019 0728 153 (H) 70 - 130 mg/dL Final   08/28/2019 1057 208 (H) 70 - 130 mg/dL Final   08/28/2019 0704 92 70 - 130 mg/dL Final   08/27/2019 2044 194 (H) 70 - 130 mg/dL Final   08/27/2019 1631 77 70 - 130 mg/dL Final   08/27/2019 1617 64 (L) 70 - 130 mg/dL Final   08/27/2019 1113 138 (H) 70 - 130 mg/dL Final   08/27/2019 0726 125 70 - 130 mg/dL Final     Results from last 7 days   Lab Units 08/28/19  0711 08/26/19  0658 08/23/19  0631   WBC 10*3/mm3 5.57 5.67 4.21   HEMOGLOBIN g/dL 9.7* 9.7* 9.5*   HEMATOCRIT % 30.5* 31.1* 30.6*   PLATELETS 10*3/mm3 254 270 306       Ref. Range 5/22/2019 05:44 5/22/2019 11:34 7/9/2019 08:25 7/18/2019 04:49 7/19/2019 05:05 7/23/2019 05:56 8/1/2019 06:48   Hemoglobin Latest Ref Range: 12.0 - 15.9 g/dL 10.8 (L)  10.6 (L) 8.5 (L) 9.7 (L) 9.3 (L) 7.4 (L)   Hematocrit Latest Ref Range: 34.0 - 46.6 % 35.1  33.4 (L) 26.2 (L) 29.9 (L) 28.6 (L) 23.6 (L)     Results from last 7 days   Lab Units 08/28/19  0711 08/26/19  0658 08/23/19  0631   SODIUM mmol/L 139 141 137   POTASSIUM mmol/L 3.7 3.6 3.6   CHLORIDE mmol/L 102 104 101   CO2 mmol/L 27.9 27.0 28.4   BUN mg/dL 20 20 17   CREATININE mg/dL 0.85 0.72 0.73   CALCIUM mg/dL 9.1 8.5* 9.1   BILIRUBIN mg/dL 0.3  --   --    ALK PHOS U/L 88  --   --    ALT  (SGPT) U/L 13  --   --    AST (SGOT) U/L 19  --   --    GLUCOSE mg/dL 76 142* 108*      Results for NOMAN TEIXEIRA (MRN [id number]) as of 8/28/2019 10:12   Ref. Range 8/1/2019 06:47   Alkaline Phosphatase Latest Ref Range: 39 - 117 U/L 107   Total Protein Latest Ref Range: 6.0 - 8.5 g/dL 5.2 (L)   ALT (SGPT) Latest Ref Range: 1 - 33 U/L 31   AST (SGOT) Latest Ref Range: 1 - 32 U/L 32   Total Bilirubin Latest Ref Range: 0.2 - 1.2 mg/dL 0.3   Albumin Latest Ref Range: 3.50 - 5.20 g/dL 2.90 (L)      Ref. Range 8/1/2019 06:47   25 Hydroxy, Vitamin D Latest Ref Range: 30.0 - 100.0 ng/ml 21.8 (L)       Ref. Range 8/1/2019 06:47   Total Cholesterol Latest Ref Range: 0 - 200 mg/dL 105   HDL Cholesterol Latest Ref Range: 40 - 60 mg/dL 40   LDL Cholesterol  Latest Ref Range: 0 - 100 mg/dL 57   VLDL Cholesterol Latest Ref Range: 5 - 40 mg/dL 8   Triglycerides Latest Ref Range: 0 - 150 mg/dL 40   Medication Review: done  Scheduled Meds:    amLODIPine 5 mg Oral BID - RT   apixaban 5 mg Oral Q12H   aspirin 325 mg Oral Daily   atorvastatin 80 mg Oral Nightly   brimonidine 1 drop Both Eyes BID   dorzolamide 1 drop Both Eyes BID   glipiZIDE 5 mg Oral BID AC   hydrALAZINE 50 mg Oral Q8H   insulin lispro 0-7 Units Subcutaneous 4x Daily With Meals & Nightly   losartan-HCTZ (HYZAAR) 100-12.5 combo dose  Oral Daily   melatonin 3 mg Oral Nightly   metFORMIN 1,000 mg Oral BID With Meals   nebivolol 20 mg Oral BID   nystatin 5 mL Swish & Spit 4x Daily   pantoprazole 40 mg Oral BID AC   PARoxetine 10 mg Oral Daily   potassium chloride 20 mEq Oral Daily With Breakfast   vitamin D 50,000 Units Oral Q7 Days     Continuous Infusions:   PRN Meds:.•  acetaminophen  •  aluminum-magnesium hydroxide-simethicone  •  dextrose  •  dextrose  •  glucagon (human recombinant)  •  nitroglycerin  •  OLANZapine      Assessment/Plan       Stroke (cerebrum) (CMS/HCC)      Assessment & Plan  Status post left CVA with aphasia and spastic right  hemiparesis    Neuro stimulation-amantadine added July 27  August 10-discussed with the patient's  yesterday possibly doing a trial of Namenda next week for aphasia.  If add Namenda, will probably discontinue amantadine as she continues alert.  August 11-she has some increased irritability at times.  This may be related to the underlying stroke deficits itself.  She does not appear to have any dysuria, no odor to her urine.  Staff reports that for the most part she is eating okay.  Will check a follow-up CT of the head to assess for any interval visual changes.  She is on aspirin and Eliquis for stroke prophylaxis.  She had noticeably improved alertness when amantadine was started.  She is readily alert now.  This may be related to natural recovery in terms of her level of arousal at this point.  Current plan is to discontinue the amantadine to see if that helps with her irritability and start  on Namenda for aphasia.  August 12 -  Patient with increased restlessness at times.   Continues aphasia. Not able to identify any complaint.  Appears anxious today. No change on CT head yesterday. Started on Namenda for aphasia on 8/12/19.   August 13- will continue to monitor on recent med adjustments   August 15-She continues with expressive language deficits.    Again appears anxious related to her aphasia and difficulty expressing herself.    She will be able to get occasional single word for the examiner.  She does follow instructions.  The patient was reviewed with .  Discussed adding on an antidepressant with anxiolytic properties -Paxil-as it appears frustration from her aphasia with associated anxiety with the frustration affects her performance.  We will plan on starting Paxil 10 mg daily tomorrow and monitor response.  Reviewed with the patient's  that would not add a short acting anxiolytic (such as a benzodiazepine or Seroquel) as it may affect her cognitive performance.    August 16-started  "Paxil 10 mg daily  August 19 - Increased restlessness Friday night - ? Related to UTI vs initiation of Paxil. On Cefdinir for GNR pending ID &Sensitivity.  August 20 -urine culture with E. coli ESBL.  Given her aphasia not able to obtain information regarding dysuria.  As the urinalysis was ordered as part of work-up for increased restlessness this weekend, would have to treat as symptomatic although could be asymptomatic bacteriuria.  Will place on ertapenem 1 g IV daily for 3-5 days.  August 21 - increased restlessness/anxiety today. Labs without any acute change. Blood glucose okay this AM. BP pattern okay. No gross motor changes. Follow on current regimen for now.   Addendum-Patient continued today with  increased restlessness/anxiety/refusing aspects of nursing care. She had had a good day in therapies yesterday. She is calmer now with family present. Did allow IV antibiotic to be infused but has not taken PO meds or eaten dinner yet.   Discussed with patient's /children will need to hold on planned discharge tomorrow given her mood/behavior today.   I do not feel Namenda (started as trial for aphasia) is related as had episodes of restlessness prior to starting, but will discontinue for now so  slate with psychoactive medications. Continue Paxil for now. Will consult Psychiatry for input.  August 22-patient more cooperative today, taking medications and will participate in therapies.  Still perseverates on \"no\".  Still with some anxiety related to her aphasia but less than yesterday.  Seen by psychiatry and to continue with Paxil 10 mg daily.  Also order written for Zyprexa 5 mg IM every 8 hours as needed agitation.  To observe on current regimen.  August 26 - still gets anxious/frustrated by aphasia. Not participate with therapies or eat meal at times. Continues on Paxil. Will ask Psychiatry for any additional thoughts in AM.   August 27-patient received Zyprexa 5 mg IM at about 830 last " evening.  Slept last night.  She is not anxious this morning.  Participating with activities.  Will plan to get updated assessment from psychiatry for any adjustment in regimen before discharge to subacute - per facility cannot go on a prn medication. Addendum: per Psychiatry,  recommend giving Paxil opportunity to exert its clinical effect over the next 1-1/2 to 2 weeks.  August 28-she did better last evening per nursing.  Reported to sleep well.  She is received Zyprexa as needed only once in the past 6 days. Patient reviewed with Psychiatry.     Will place on melatonin 3 mg at nighttime scheduled for sleep.  As patient's overall status appears stable and not making any new interventions at this time, allowing Paxil and opportunity to take effect, [facility] [place], but no bed available until the end of the week.  She will need follow-up assessments by the physicians at that facility to adjust her medication - Paxil - as needed.  August 29-patient was more restless last evening, required multiple staff members in the room. She continues with restlessness, and has been more resistant to care.  She is been pushing staff away or hanging onto staff.  Not taking her medications consistently.  Did not eat breakfast.  She will put her foot on the door frame when staff is trying to move out of the room in her wheelchair.  She received Zyprexa 5 mg IM on this morning without any change in her mood/behavior.  Patient has been reviewed by the psychiatric service and plan is to transfer to the CMU unit for further assessment and management with adjustment in her medications to help with her mood/behavior.  Discussed with the patient's  and he is in agreement with this plan.  She will transferred to the CMU today.    Aphasia -  August 12 - trial of Namenda  August 16-continue to observe on Namenda 5 mg daily for her aphasia and may possibly titrate up next week  August 20-titrate up on Namenda to  5 mg twice a day  August 22-discontinued Namenda.  Do not feel related to her anxiety/restlessness as it was present prior to starting but discontinued to avoid any additional psychoactive medications that might add to it.  Had not seen any improvement in her aphasia with the very brief trial.    Dysphagia-Cortrak feeding tube discontinued on July 31 and started on puréed/honey thick liquid diet with strategies  August 7-advance to fork mash nectar thick liquid diet, consistent carbohydrate.  August 14 - repeat VFSS to assess for advancing to thin liquids  August 15-Video fluoroscopic swallow study today-mechanical soft, no mixed consistency, nectar thick liquid, water protocol between meals, no straws. May take meds whole in Puree or NTL as tolerated. May have ice chips or sips of water in between meals after oral care per protocol.  August 28 - Pt is tolerating trials of regular with thins by cup.   - upgrade to regular with thins, no straws. She continues to need 1:1 supervision for meals  . She can take meds crushed in puree.    History of multiple bilateral strokes and left central retinal artery occlusion    History of left greater than right internal carotid artery stenosis-status post left internal carotid artery stent July 17, 2019  Follow with Neurosurgery in office in October with carotid ultrasound    Stroke prophylaxis-aspirin/Eliquis/atorvastatin 80 mg  August 28-recheck liver enzymes and lipid panel today and recheck again in 1 month first of October    History of hypertension -  Hyzaar 100-25. August 4 - Bystolic increased to 20 mg daily to 20 mg bid.  August 8 - BP still runs high. On discharge in May 2019 was on Hyzaar 100-25 mg daily, Bystolic 20 mg daily, amlodipine 10 mg daily, Hydralazine 50 mg q 8 hours.  Will add Hydralazine initially 10 mg po q 8 hours and titrate up as needed.   August 9-systolic blood pressure elevated last night and early this morning but better this afternoon at 122-130.   Continue to follow recent addition of hydralazine and titrate up as needed  August 10-systolic blood pressure 175 this afternoon, range otherwise 122-142.  Will titrate up on hydralazine to 20 mg every 8 hours.  August 11-hypertension overall appears improved.  Will titrate up further to 25 mg every 8 hours.  August 12 - add amlodipine 5 mg daily.   August 14 - titrate up on hydralazine to 50 mg q 8 hours  August 15-blood pressure improved this afternoon with systolic blood pressure in the 120s-follow on current regimen  August 16-blood pressure range 110////59-will continue to monitor on present regimen  August 19 - /74 early AM, later 136/71 - increase amlodipine to 5 mg bid  August 28-blood pressure range 112-140/60-68    History of diabetes mellitus -Lantus/glipizide (home medication metformin 1000 mg twice daily and glipizide 10 mg twice daily)  August 1-blood sugars were low yesterday afternoon after tube feeds discontinued.  Held glipizide last night and this morning and Lantus last night.  Blood sugar elevated at noontime today.  Will receive resume glipizide at a lower dose of 5 mg twice a day with meals.  Continue sliding scale insulin coverage.  She also is on metformin at home.  August 5-add back metformin initially at 500 mg twice a day and continue glipizide 5 mg twice a day, both one half of previous home dose, titrate up as needed.  August 7-titrate up metformin to 850 mg twice a day.  Add consistent carbohydrate restriction to diet.  August 9-increase metformin to 1000 g twice a day.  August 10-blood glucose 340 last evening but 150-114-178 so far today  August 11-continue to follow blood sugar pattern and may increase glipizide further as current dose glipizide 5 mg twice a day along with metformin 1000 mg twice a day  August 14 - blood glucose  past 24 hours - monitor pattern.  August 15-blood glucose [phone]-continue to follow pattern.  Will change  sliding scale insulin coverage to Humalog at a lower dose.  She was started on the regular insulin sliding scale when she was on tube feeds.  August 16 - recent blood glucose -[phone]  August 19 - recent blood glucose [phone] - increase glipizide to 7.5 mg bid  August 21 - refused blood glucose check today. Will decrease glipizide back to 5 mg bid to avoid potential for hypoglycemia until allow blood glucose check.   August 28-recent blood glucose checks [phone] this morning.  Continues on metformin thousand milligrams twice a day and glipizide 5 mg twice a day.  The one low blood sugar yesterday afternoon is not typical and will not make any adjustments today in her regimen but would continue to follow and possibly decrease morning glipizide dose if needed.        Anemia-August 1-hemoglobin 7.4.  Most recent check on July 23 HGB 9.3.  Will recheck hemoglobin this afternoon.  Hemoccult stool.  Iron studies.  Reticulocyte count.  Recheck CBC in the a.m.  Added Protonix.  Patient is on aspirin and Eliquis.  With recent stroke  will look to transfuse packed red blood cell.  August 2-hemoglobin improved 9.0-1 unit packed red blood cells.  Elevated reticulocyte count in response to anemia.  Nursing describes dark stool.  Patient discussed with gastroenterology.  At this point unable to do endoscopy as on Eliquis and aspirin.  Will treat symptomatically for now with increasing proton pump inhibitor and transfusing as needed.  August 5-anemia improved-hemoglobin 9.8  August 16-hemoglobin unchanged 9.8    DVT prophylaxis-SCDs/anticoagulation    Impulsivity-   Nursing to do re-orientation with the patient.  Room close to the nursing station.    Endocrine-vitamin D deficiency-ergocalciferol 50,000 units weekly x8 weeks added. Vitamin B12 level and TSH checked in late May unremarkable    Urinary tract infection-August 20 -urine culture with E. coli ESBL.  Given her aphasia not able to obtain information  regarding dysuria.  As the urinalysis was ordered as part of work-up for increased restlessness this weekend, would have to treat as symptomatic although could be asymptomatic bacteriuria.  Placed on ertapenem 1 g IV daily for 3  days.     Impaired cognition/impaired language, impaired swallow, impaired activity daily living, impaired mobility     REHAB- admit for comprehensive acute inpatient rehabilitation .  This would be an interdisciplinary program with physical therapy 1 hour,  occupational therapy 1 hour, and speech therapy 1 hour, 5 days a week.  Rehabilitation nursing for carryover, monitoring of cardiopulmonary and neurologic   status, bowel and bladder, and skin  Ongoing physician follow-up.  Weekly team conferences.  Goals are indeterminant.   Rehabilitation prognosis determined.  Medical prognosis determined.  Estimated length of stay is indeterminate.    TEAM CONF - AUGUST 6- TRANFERS CTG. GAIT UP  FEET CTG-MIN ASSIST. UBD MIN. LBD MOD. BATH MOD. SEVERE APHASIA. INCREASED RESISTANCE TO PARTICIPATE AT TIMES.   PUREE/HTL.  GOOD PO INTAKE. ADJUSTING MEDS FOR DIABETES MELLITUS.  BLADDER CONTINENT/INCONTIENT.   ELOS- 2 WEEKS.     TEAM CONF - AUGUST 13 - DID GREAT WITH PT ON Saturday, FOLLOWING COMMANDS AND BATTING A BALL WITH ANOTHER PATIENT. YESTERDAY, VERY FRUSTRATED AND IRRITABLE.  FRUSTRATED BY APHASIA, TRYING TO TELL STAFF SOMETHING. WILL HOLD ON TO OBJECTS, REPEAT SINGLE WORD.   BED CTG. 4 STAIRS CTG.  GAIT 220 FEET CTG-SBA NO DEVICE.  TOILET TRANSFERS CTG-MIN.  GROOMING MIN.  UBD MIN ASSIST FOR BRA, LBD CTG-MIN. BATH CTG-MIN. COORDINATION RUE BETTER.  DYSPHAGIA - IMPROVED - FORK TALA NTL. DO NOT FEEL SHE WOULD TOLERATE E-STIM. RECEPTIVE LANGUAGE IMPROVED SLIGHTLY , POINTING TO OBJECTS. EXPRESSIVELY PERSEVERATIVE ON A SINGLE WORD, TRIES TO USE PICTURE BOARD TO COMMUNICATE. YES/NO NOT ACCURATE.  CONTINENT BOWEL AND BLADDER, TIMED VOIDS. SKIN INTACT. TYPICALLY GOOD INTAKE.  ELOS - 1.5  WEEKS    AUGUST 14 - In therapies - In OT, increased participation noted with  present   Transfers SBA/CTG  Walked from therapy gym to room without AD SBA and vc's for directions back to the room   300' outdoors on sidewalk, incline; 300', 180', 100' up on rehab unit. Pt was able to find her room when she got close to it  Bathing, dressing, grooming min assist. Toileting moderate assist.  Pt participated in using R hand to manipualte round pegs into peg board by color with MIN difficutly once pt caught on to the task. with 3D block recreation with R hand with 100% color match, 80% accuracy with block recreation  With SLP, patient was not able to effectively communicate her wants/needs but refused to go to therapy office by planting her heels while rolling in wc down the [place]; tx session completed in her room     August 15-The patient was reviewed with .  Discussed adding on an antidepressant with anxiolytic properties -Paxil-as it appears frustration from her aphasia with associated anxiety with the frustration affects her performance.  We will plan on starting Paxil 10 mg daily tomorrow and monitor response.  Reviewed with the patient's  that would not add a short acting anxiolytic (such as a benzodiazepine or Seroquel) as it may affect her cognitive performance.    August 16-Patient was reviewed with her daughter today including rehabilitation goals, discharge planning, and recent medication adjustment to try to help with her anxiety/frustration with her aphasia.  Reviewed with the daughter that on discussion with the team is felt that she would do better with her functional status/aphasia/anxiety if able to be discharged to home environment with more frequent interactions but if that is not feasible with the need to look at subacute options.    TEAM CONF - AUGUST 20 - GAIT CTG- FEET. NO DEVICE, WALKS TO AND FROM GYM.  ADLS CTG WITH CUING.  PLAYED ENTIRE GAME OF CHECKERS YESTERDAY.  VARIABLE PERFORMANCE WITH SPEECH THERAPY 20-90% MATCHING WORDS TO PICTURES.    MECH SOFT, GROUND MEAT, NECTAR THICK LIQUIDS.   TYPICALLY CONTINENT BLADDER BUT INCONTINENT BOWEL. DID FINE LAST EVENING PER SISTER AND DID NOT TRY TO GET UP- WILL DISCONTINUE SITTER. IN ROOM CLOSE TO NURSING STATION.   ELOS- FAMILY CONF LAST WEEK- WILL WORK TOWARD SUBACUTE PLACEMENT     August 23-upper body dressing set up, lower body dressing min assist.  Grooming supervision.  Transfers standby assist.  Ambulated 220 feet without a device contact-guard standby assist.  She spent in therapy this morning but not with physical therapy this afternoon    TEAM CONF - AUGUST 27 - CONTINUES WITH APHASIA. WORKED ON MELODIC INTONATION. BED SBA. 4 STAIRS CTG. GAIT 300 FEET SBA. TOILET TRANSFERS SBA. SHOWER TRANSFERS SBA. VOIDING ON OWN. UBD MIN ASSIST. LBD CTG-MIN. BATH CTG. DIET - MECH SOFT, GROUND MEATS, NECTAR THICK LIQUIDS. NOT ABLE TO MANAGE AT HOME IN SHORT TERM. WILL ASK PSYCHIATRY SEE IN FOLLOW UP FOR ANY FINAL ADJUSTMENTS IN REGIMEN BEFORE DISCHARGE TO SUBACUTE.  .  ELOS- DISPOSITION PENDING.      .  August 29-patient was more restless last evening, required multiple staff members in the room. She continues with restlessness, and has been more resistant to care.  She is been pushing staff away or hanging onto staff.  Not taking her medications consistently.  Did not eat breakfast.  She will put her foot on the door frame when staff is trying to move out of the room in her wheelchair.  She received Zyprexa 5 mg IM on this morning without any change in her mood/behavior.  Patient has been reviewed by the psychiatric service and plan is to transfer to the CMU unit for further assessment and management with adjustment in her medications to help with her mood/behavior.  Discussed with the patient's  and he is in agreement with this plan.  She will transferred to the CMU today.       Barrier to home discharge includes  Impaired  cognitive-linguistic skills - work on receptive and expressive language and cognition.       Ajit Howard MD  08/29/19  11:08 AM    Time:

## 2019-08-29 NOTE — THERAPY TREATMENT NOTE
Inpatient Rehabilitation - Occupational Therapy Treatment Note    [facility]     Patient Name: Darby Workman  : 1945  MRN: [id number]    Today's Date: 2019                 Admit Date: 2019      Visit Dx:  No diagnosis found.    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)   • Right-sided headache   • Acute ischemic stroke (CMS/HCC)   • Embolic stroke (CMS/HCC)   • Cerebral infarction due to stenosis of left carotid artery (CMS/HCC)   • Anticoagulated by anticoagulation treatment   • Stroke (cerebrum) (CMS/HCC)         Therapy Treatment    IRF Treatment Summary     Row Name 19 1153             Evaluation/Treatment Time and Intent    Subjective Information  -- upset this AM   -AF      Existing Precautions/Restrictions  fall contact precautions, global aphasia  -AF      Document Type  therapy note (daily note)  -AF      Mode of Treatment  occupational therapy  -AF      Patient/Family Observations  increased frustration and agitation this AM in first session, required increased assistance to return to bed. pt is resistive to therapy this AM, in 2nd session sitting with sitter in chair deferred therapy almost falling asleep in chair refused to lay down in bed  -AF      Recorded by [AF] Ingris Ansari, JOHN        User Key  (r) = Recorded By, (t) = Taken By, (c) = Cosigned By    Initials Name Effective Dates    AF Ingris Ansari, OTR 18 -           Wound 19 1015 Left neck incision (Active)   Dressing Appearance open to air 2019  8:00 AM   Closure Liquid skin adhesive 2019  8:00 AM   Base clean;dry 2019  8:00 AM   Periwound intact 2019  8:00 AM   Periwound Temperature warm 2019  8:00 AM   Periwound Skin Turgor soft 2019  8:00 AM   Drainage Amount none 2019  8:00 AM          OT Recommendation and Plan    Anticipated Equipment Needs At Discharge (OT Eval): bathing equipment  Planned Therapy Interventions (OT Eval): activity tolerance training, BADL retraining, cognitive/visual perception retraining, functional balance retraining, neuromuscular control/coordination retraining, occupation/activity based interventions, patient/caregiver education/training, ROM/therapeutic exercise, strengthening exercise, transfer/mobility retraining            OT IRF GOALS     Row Name 08/26/19 1315 08/22/19 1543          Transfer Goal 1 (OT-IRF)    Progress/Outcomes (Transfer Goal 1, OT-IRF)  --  goal met  -AF        Transfer Goal 2 (OT-IRF)    Activity/Assistive Device (Transfer Goal 2, OT-IRF)  --  shower chair;walk-in shower;toilet  -AF     Hall Level (Transfer Goal 2, OT-IRF)  --  standby assist;contact guard assist  -AF     Time Frame (Transfer Goal 2, OT-IRF)  --  long term goal (LTG)  -AF     Progress/Outcomes (Transfer Goal 2, OT-IRF)  goal met  -AF  goal ongoing  -AF        Bathing Goal 1 (OT-IRF)    Progress/Outcomes (Bathing Goal 1, OT-IRF)  --  goal met  -AF        Bathing Goal 2 (OT-IRF)    Activity/Device (Bathing Goal 2, OT-IRF)  bathing skills, all;grab bar/tub rail;hand-held shower spray hose;shower chair  -AF  bathing skills, all;grab bar/tub rail;hand-held shower spray hose;shower chair  -AF     Hall Level (Bathing Goal 2, OT-IRF)  standby assist  -AF  contact guard assist  -AF     Time Frame (Bathing Goal 2, OT-IRF)  long term goal (LTG)  -AF  long term goal (LTG)  -AF     Progress/Outcomes (Bathing Goal 2, OT-IRF)  goal ongoing  -AF  goal ongoing  -AF        UB Dressing Goal 1 (OT-IRF)    Activity/Device (UB Dressing Goal 1, OT-IRF)  upper body dressing  -AF  upper body dressing  -AF     Hall (UB Dress Goal 1, OT-IRF)  set-up required  -AF  set-up required  -AF     Time Frame (UB Dressing Goal 1, OT-IRF)  long term goal (LTG)  -AF  long term goal (LTG)   -AF     Progress/Outcomes (UB Dressing Goal 1, OT-IRF)  goal ongoing  -AF  goal ongoing  -AF        LB Dressing Goal 1 (OT-IRF)    Progress/Outcomes (LB Dressing Goal 1, OT-IRF)  --  goal met  -AF        LB Dressing Goal 2 (OT-IRF)    Activity/Device (LB Dressing Goal 2, OT-IRF)  lower body dressing  -AF  lower body dressing  -AF     [place] (LB Dressing Goal 2, OT-IRF)  standby assist;verbal cues required  -AF  verbal cues required;contact guard assist  -AF     Time Frame (LB Dressing Goal 2, OT-IRF)  long term goal (LTG)  -AF  long term goal (LTG)  -AF     Progress/Outcomes (LB Dressing Goal 2, OT-IRF)  goal ongoing  -AF  goal ongoing  -AF        Grooming Goal 1 (OT-IRF)    Progress/Outcomes (Grooming Goal 1, OT-IRF)  --  goal met  -AF        Grooming Goal 2 (OT-IRF)    Activity/Device (Grooming Goal 2, OT-IRF)  --  grooming skills, all  -AF     [place] (Grooming Goal 2, OT-IRF)  --  supervision required  -AF     Time Frame (Grooming Goal 2, OT-IRF)  --  long term goal (LTG)  -AF     Progress/Outcomes (Grooming Goal 2, OT-IRF)  goal met  -AF  goal ongoing  -AF        Toileting Goal 1 (OT-IRF)    Progress/Outcomes (Toileting Goal 1, OT-IRF)  --  goal met  -AF        Toileting Goal 2 (OT-IRF)    Activity/Device (Toileting Goal 2, OT-IRF)  toileting skills, all;grab bar/safety frame  -AF  toileting skills, all  -AF     [place] Level (Toileting Goal 2, OT-IRF)  standby assist;contact guard assist  -AF  verbal cues required;contact guard assist  -AF     Time Frame (Toileting Goal 2, OT-IRF)  long term goal (LTG)  -AF  long term goal (LTG)  -AF     Progress/Outcomes (Toileting Goal 2, OT-IRF)  goal ongoing  -AF  goal ongoing  -AF        Caregiver Training Goal 1 (OT-IRF)    Caregiver Training Goal 1 (OT-IRF)  pt and family to demo safe techniques with ADLs, transfers, HEP and Adaptive equipment as needed prior to d/c   -AF  family and patient to demo safe technqiues with ADLs, transfers, HEP and adaptive  equipment as needed prior to d/c   -AF     Time Frame (Caregiver Training Goal 1, OT-IRF)  long term goal (LTG)  -AF  long term goal (LTG)  -AF     Progress/Outcomes (Caregiver Training Goal 1, OT-IRF)  goal ongoing  -AF  goal ongoing  -AF       User Key  (r) = Recorded By, (t) = Taken By, (c) = Cosigned By    Initials Name Provider Type    Ingris Youssef, OTR Occupational Therapist          Occupational Therapy Education     Title: PT OT SLP Therapies (Not Started)     Topic: Occupational Therapy (In Progress)     Point: ADL training (In Progress)     Description: Instruct learner(s) on proper safety adaptation and remediation techniques during self care or transfers.   Instruct in proper use of assistive devices.    Learning Progress Summary           Patient Nonacceptance, E, NR,NL by AF at 8/28/2019  9:22 AM    Comment:  after time therapist understood that patient wanted water to drink, educated patient that she needed to brush her teeth prior to thin liquid. unable to get pt to agree to brush teeth    Acceptance, E, NR,NL by JHONNY at 8/19/2019 11:08 PM    Nonacceptance, E,D, NR by JS at 8/17/2019 11:44 AM    Acceptance, E, VU,NR by AF at 8/15/2019  3:22 PM    Comment:  Pt and family participated in meeting with rehab team, recommend 24 hour supervision and would beneift from being in her own environment. discussed her safety awareness and the need for hands on assistance with some ADL tasks seocndary R UE and cognition    Nonacceptance, E, NR by AF at 8/13/2019  3:48 PM    Comment:  benefits and purpose of therapy   Family Acceptance, E, VU,NR by AF at 8/15/2019  3:22 PM    Comment:  Pt and family participated in meeting with rehab team, recommend 24 hour supervision and would beneift from being in her own environment. discussed her safety awareness and the need for hands on assistance with some ADL tasks seocndary R UE and cognition                   Point: Home exercise program (In Progress)      Description: Instruct learner(s) on appropriate technique for monitoring, assisting and/or progressing therapeutic exercises/activities.    Learning Progress Summary           Patient Acceptance, E, NR,NL by WN at 8/19/2019 11:08 PM    Nonacceptance, E,D, NR by JS at 8/17/2019 11:44 AM    Acceptance, E, VU,NR by AF at 8/15/2019  3:22 PM    Comment:  Pt and family participated in meeting with rehab team, recommend 24 hour supervision and would beneift from being in her own environment. discussed her safety awareness and the need for hands on assistance with some ADL tasks seocndary R UE and cognition   Family Acceptance, E, VU,NR by AF at 8/15/2019  3:22 PM    Comment:  Pt and family participated in meeting with rehab team, recommend 24 hour supervision and would beneift from being in her own environment. discussed her safety awareness and the need for hands on assistance with some ADL tasks seocndary R UE and cognition                               User Key     Initials Effective Dates Name Provider Type Discipline     04/06/17 -  Steph Salinas, HESHAM Physical Therapist PT    AF 04/03/18 -  Ingris Ansari, OTR Occupational Therapist OT    WN 06/24/19 -  Luis Abernathy, RN Registered Nurse Nurse                       Time Calculation:         Therapy Charges for Today     Code Description Service Date Service Provider Modifiers Qty    [id number] HC OT SELF CARE/MGMT/TRAIN EA 15 MIN 8/28/2019 Ingris Ansari, OTR GO 1                   Ingris Ansari OTR  8/29/2019

## 2019-08-29 NOTE — DISCHARGE SUMMARY
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  Dysphagia- History of multiple bilateral strokes and left central retinal artery occlusion  History of left greater than right internal carotid artery stenosis-status post left internal carotid artery stent July 17, 2019  History of hypertension  History of diabetes mellitus  Anemia  Vitamin D deficiency    History of Present Illness  Patient is a 74-year-old female with a history of diabetes mellitus, hypertension, hyperlipidemia with a history of multiple bilateral ischemic infarcts, largest involving left occipital left temporal lobe, and  history of left central retinal artery occlusion in May of this year, admission from May 14 to May 18..  Also noted was left greater than right internal carotid artery stenosis and left P2 stenosis.  She was on aspirin and Plavix.  ZIO patch placed. She had readmission to the hospital on May 21 with findings of left temple, left temporal parietal, left occipital and multiple bilateral ischemic infarcts.  Regimen was changed to aspirin and Eliquis.  Left internal carotid artery 85% stenosis of right internal carotid artery 60% stenosis.  Carotid duplex on June 17 showed left internal carotid artery 80-99% stenosis in right internal carotid artery 50-59% stenosis.       She therefore on 7/17/2019 underwent left internal carotid artery stent.  Postoperatively CT of the brain on July 19 showed multiple old small bilateral infarcts as well as very subtle loss of gray-white matter differentiation that involves the entire left corona radiata region, internal capsule, putamen, external capsule, insular cortex, left temporal lobe, posterior inferior left frontal and anterior and lateral left parietal lobe that measures up to 8.4 x 4.5 x 4.5 cm in  anteroposterior and mediolateral and craniocaudal dimension - compatible with a very subtle acute left middle cerebral artery territory infarct and there is additional patchy area of intraparenchymal hemorrhage in the posterior lateral left parietal-occipital region that measures 2 x 1.5 cm in size likely a petechial hemorrhage at the posterior margin of the infarct. Ventricles are currently normal in size. There is no significant mass effect or midline shift.     She has had a aphasia and spastic right hemiparesis.  She has dysphagia and was on Cortrak tube feeds.    On July 25 with physical therapy transfers moderate assist of 2.  Sitting balance contact-guard assist.  She  had decreased alertness.  On July 25 she underwent CTA/perfusion.  The left internal carotid artery stent and the left middle cerebral artery were patent.  Perfusion images with no significant findings.  July 26 with the nursing staff she took a couple steps to the chair at the bedside.    She has shown more alertness and participation in therapy.    On July 27-amantadine added for neuro stimulation  On July 29-Aphasia, some short response Gait 30 feet mod/min x 2.  Toileting dependent. Requires max verbal cues with OT.  DHT feeds. Improved performance in therapies.  On July 31-underwent a videofluoroscopic swallow study.  Started on puréed/honey thick liquid diet.    VFSS completed. Recommend pureed diet with HTL; meds crushed in pureed; upright for meals and 30 min after; slow rate; small bites/sips; no straws; supervision with meals. Pt's cognitive status played a part in swallowing. ST to follow for diet tolerance and reeval as indicated.  With physical therapy on July 30 patient was more verbal.  She said occasional single word.  Minimal assist with bed mobility.  Transfers minimal assist 2.  Amylase 30 feet minimum assist of 2/moderate assist.  Requires verbal cues during ambulation for him placement on the walker and assistance with  guidance of the rolling walker.  She continues with bilateral wrist restraints.  She continues with aphasia.  She will make some utterances.  She will do occasional word but not in the context of the question.  She was not able to state her name.     Given her functional impairments and comorbidities she is now admitted for acute inpatient rehab           Review of Systems   Not obtainable given the patient's aphasia  Medical History        Past Medical History:   Diagnosis Date   • Acute cerebrovascular accident (CVA) of cerebellum (CMS/[facility])     • Acute ischemic stroke (CMS/[facility])     • Arthritis     • Coronary artery disease     • CVA (cerebral vascular accident) (CMS/[facility])     • Diabetes mellitus (CMS/[facility])     • Heart attack (CMS/[facility])     • History of blood clots     • Hyperlipidemia     • Hypertension     • Vision loss           Surgical History         Past Surgical History:   Procedure Laterality Date   • CAROTID ENDARTERECTOMY Left 7/17/2019     Procedure: Left carotid endarterectomy;  Surgeon: Rupert Oliva MD;  Location: [facility];  Service: Neurosurgery   • EMBOLECTOMY Left 7/17/2019     Procedure: CEREBRAL ANGIOGRAM, LEFT INTERNAL CAROTID ARTERY STENT;  Surgeon: Rupert Oliva MD;  Location: [facility] OR 18/19;  Service: Neurosurgery         Family History   Problem Relation Age of Onset   • Arthritis Mother     • Heart disease Father     • Breast cancer Neg Hx     • Malig Hyperthermia Neg Hx        Social History            Tobacco Use   • Smoking status: Never Smoker   • Smokeless tobacco: Never Used   Substance Use Topics   • Alcohol use: No       Frequency: Never   • Drug use: No   .  Lives with her .      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  If add  Namenda, will probably discontinue amantadine as she continues alert.  August 11-she has some increased irritability at times.  This may be related to the underlying stroke deficits itself.  She does not appear to have any dysuria, no odor to her urine.  Staff reports that for the most part she is eating okay.  Will check a follow-up CT of the head to assess for any interval visual changes.  She is on aspirin and Eliquis for stroke prophylaxis.  She had noticeably improved alertness when amantadine was started.  She is readily alert now.  This may be related to natural recovery in terms of her level of arousal at this point.  Current plan is to discontinue the amantadine to see if that helps with her irritability and start  on Namenda for aphasia.  August 12 -  Patient with increased restlessness at times.   Continues aphasia. Not able to identify any complaint.  Appears anxious today. No change on CT head yesterday. Started on Namenda for aphasia on 8/12/19.   August 13- will continue to monitor on recent med adjustments   August 15-She continues with expressive language deficits.    Again appears anxious related to her aphasia and difficulty expressing herself.    She will be able to get occasional single word for the examiner.  She does follow instructions.  The patient was reviewed with .  Discussed adding on an antidepressant with anxiolytic properties -Paxil-as it appears frustration from her aphasia with associated anxiety with the frustration affects her performance.  We will plan on starting Paxil 10 mg daily tomorrow and monitor response.  Reviewed with the patient's  that would not add a short acting anxiolytic (such as a benzodiazepine or Seroquel) as it may affect her cognitive performance.    August 16-started Paxil 10 mg daily  August 19 - Increased restlessness Friday night - ? Related to UTI vs initiation of Paxil. On Cefdinir for GNR pending ID &Sensitivity.  August 20 -urine culture  "with E. coli ESBL.  Given her aphasia not able to obtain information regarding dysuria.  As the urinalysis was ordered as part of work-up for increased restlessness this weekend, would have to treat as symptomatic although could be asymptomatic bacteriuria.  Will place on ertapenem 1 g IV daily for 3-5 days.  August 21 - increased restlessness/anxiety today. Labs without any acute change. Blood glucose okay this AM. BP pattern okay. No gross motor changes. Follow on current regimen for now.   Addendum-Patient continued today with  increased restlessness/anxiety/refusing aspects of nursing care. She had had a good day in therapies yesterday. She is calmer now with family present. Did allow IV antibiotic to be infused but has not taken PO meds or eaten dinner yet.   Discussed with patient's /children will need to hold on planned discharge tomorrow given her mood/behavior today.   I do not feel Namenda (started as trial for aphasia) is related as had episodes of restlessness prior to starting, but will discontinue for now so  slate with psychoactive medications. Continue Paxil for now. Will consult Psychiatry for input.  August 22-patient more cooperative today, taking medications and will participate in therapies.  Still perseverates on \"no\".  Still with some anxiety related to her aphasia but less than yesterday.  Seen by psychiatry and to continue with Paxil 10 mg daily.  Also order written for Zyprexa 5 mg IM every 8 hours as needed agitation.  To observe on current regimen.  August 26 - still gets anxious/frustrated by aphasia. Not participate with therapies or eat meal at times. Continues on Paxil. Will ask Psychiatry for any additional thoughts in AM.   August 27-patient received Zyprexa 5 mg IM at about 830 last evening.  Slept last night.  She is not anxious this morning.  Participating with activities.  Will plan to get updated assessment from psychiatry for any adjustment in regimen before " discharge to subacute - per facility cannot go on a prn medication. Addendum: per Psychiatry,  recommend giving Paxil opportunity to exert its clinical effect over the next 1-1/2 to 2 weeks.  August 28-she did better last evening per nursing.  Reported to sleep well.  She is received Zyprexa as needed only once in the past 6 days. Patient reviewed with Psychiatry.     Will place on melatonin 3 mg at nighttime scheduled for sleep.  As patient's overall status appears stable and not making any new interventions at this time, allowing Paxil and opportunity to take effect, looking [facility], but no bed available until the end of the week.  She will need follow-up assessments by the physicians at that facility to adjust her medication - Paxil - as needed.  August 29-patient was more restless last evening, required multiple staff members in the room. She continues with restlessness, and has been more resistant to care.  She is been pushing staff away or hanging onto staff.  Not taking her medications consistently.  Did not eat breakfast.  She will put her foot on the door frame when staff is trying to move out of the room in her wheelchair.  She received Zyprexa 5 mg IM on this morning without any change in her mood/behavior.  Patient has been reviewed by the psychiatric service and plan is to transfer to the [facility] unit for further assessment and management with adjustment in her medications to help with her mood/behavior.  Discussed with the patient's  and he is in agreement with this plan.  She will transferred to the CMU today.    Aphasia -  August 12 - trial of Namenda  August 16-continue to observe on Namenda 5 mg daily for her aphasia and may possibly titrate up next week  August 20-titrate up on Namenda to 5 mg twice a day  August 22-discontinued Namenda.  Do not feel related to her anxiety/restlessness as it was present prior to starting but discontinued to avoid any additional  psychoactive medications that might add to it.  Had not seen any improvement in her aphasia with the very brief trial.     Dysphagia-Cortrak feeding tube discontinued on July 31 and started on puréed/honey thick liquid diet with strategies  August 7-advance to fork mash nectar thick liquid diet, consistent carbohydrate.  August 14 - repeat VFSS to assess for advancing to thin liquids  August 15-Video fluoroscopic swallow study today-mechanical soft, no mixed consistency, nectar thick liquid, water protocol between meals, no straws. May take meds whole in Puree or NTL as tolerated. May have ice chips or sips of water in between meals after oral care per protocol.  August 28 - Pt is tolerating trials of regular with thins by cup.   - upgrade to regular with thins, no straws. She continues to need 1:1 supervision for meals  . She can take meds crushed in puree.     History of multiple bilateral strokes and left central retinal artery occlusion     History of left greater than right internal carotid artery stenosis-status post left internal carotid artery stent July 17, 2019  Follow with Neurosurgery in office in October with carotid ultrasound     History of hypertension -  Hyzaar 100-25. August 4 - Bystolic increased to 20 mg daily to 20 mg bid.  August 8 - BP still runs high. On discharge in May 2019 was on Hyzaar 100-25 mg daily, Bystolic 20 mg daily, amlodipine 10 mg daily, Hydralazine 50 mg q 8 hours.  Will add Hydralazine initially 10 mg po q 8 hours and titrate up as needed.   August 9-systolic blood pressure elevated last night and early this morning but better this afternoon at 122-130.  Continue to follow recent addition of hydralazine and titrate up as needed  August 10-systolic blood pressure 175 this afternoon, range otherwise 122-142.  Will titrate up on hydralazine to 20 mg every 8 hours.  August 11-hypertension overall appears improved.  Will titrate up further to 25 mg every 8 hours.  August 12 - add  amlodipine 5 mg daily.   August 14 - titrate up on hydralazine to 50 mg q 8 hours  August 15-blood pressure improved this afternoon with systolic blood pressure in the 120s-follow on current regimen  August 16-blood pressure range 110////59-will continue to monitor on present regimen  August 19 - /74 early AM, later 136/71 - increase amlodipine to 5 mg bid  August 28-blood pressure range 112-140/60-68     History of diabetes mellitus -Lantus/glipizide (home medication metformin 1000 mg twice daily and glipizide 10 mg twice daily)  August 1-blood sugars were low yesterday afternoon after tube feeds discontinued.  Held glipizide last night and this morning and Lantus last night.  Blood sugar elevated at noontime today.  Will receive resume glipizide at a lower dose of 5 mg twice a day with meals.  Continue sliding scale insulin coverage.  She also is on metformin at home.  August 5-add back metformin initially at 500 mg twice a day and continue glipizide 5 mg twice a day, both one half of previous home dose, titrate up as needed.  August 7-titrate up metformin to 850 mg twice a day.  Add consistent carbohydrate restriction to diet.  August 9-increase metformin to 1000 g twice a day.  August 10-blood glucose 340 last evening but 150-114-178 so far today  August 11-continue to follow blood sugar pattern and may increase glipizide further as current dose glipizide 5 mg twice a day along with metformin 1000 mg twice a day  August 14 - blood glucose  past 24 hours - monitor pattern.  August 15-blood glucose [phone]--730-66-continue to follow pattern.  Will change sliding scale insulin coverage to Humalog at a lower dose.  She was started on the regular insulin sliding scale when she was on tube feeds.  August 16 - recent blood glucose -009-138  August 19 - recent blood glucose [phone] - increase glipizide to 7.5 mg bid  August 21 - refused blood glucose check today.  Will decrease glipizide back to 5 mg bid to avoid potential for hypoglycemia until allow blood glucose check.   August 28-recent blood glucose checks [phone]/[phone] this morning.  Continues on metformin thousand milligrams twice a day and glipizide 5 mg twice a day.  The one low blood sugar yesterday afternoon is not typical and will not make any adjustments today in her regimen but would continue to follow and possibly decrease morning glipizide dose if needed.     Stroke prophylaxis-aspirin/Eliquis/atorvastatin     Anemia-August 1-hemoglobin 7.4.  Most recent check on July 23 HGB 9.3.  Will recheck hemoglobin this afternoon.  Hemoccult stool.  Iron studies.  Reticulocyte count.  Recheck CBC in the a.m.  Added Protonix.  Patient is on aspirin and Eliquis.  With recent stroke  will look to transfuse packed red blood cell.  August 2-hemoglobin improved 9.0-1 unit packed red blood cells.  Elevated reticulocyte count in response to anemia.  Nursing describes dark stool.  Patient discussed with gastroenterology.  At this point unable to do endoscopy as on Eliquis and aspirin.  Will treat symptomatically for now with increasing proton pump inhibitor and transfusing as needed.  August 5-anemia improved-hemoglobin 9.8  August 16-hemoglobin unchanged 9.8  August 28-hemoglobin remained stable on protein pump inhibitor twice a day.     DVT prophylaxis-SCDs/anticoagulation     Impulsivity-   Nursing to do re-orientation with the patient.  Room close to the nursing station.     Endocrine-vitamin D deficiency-ergocalciferol 50,000 units weekly x8 weeks added. Vitamin B12 level and TSH checked in late May unremarkable     Urinary tract infection-August 20 -urine culture with E. coli ESBL.  Given her aphasia not able to obtain information regarding dysuria.  As the urinalysis was ordered as part of work-up for increased restlessness this weekend, would have to treat as symptomatic although could be asymptomatic bacteriuria.   Placed on ertapenem 1 g IV daily for 3  days.     Impaired cognition/impaired language, impaired swallow, impaired activity daily living, impaired mobility     REHAB- admit for comprehensive acute inpatient rehabilitation .  This would be an interdisciplinary program with physical therapy 1 hour,  occupational therapy 1 hour, and speech therapy 1 hour, 5 days a week.  Rehabilitation nursing for carryover, monitoring of cardiopulmonary and neurologic   status, bowel and bladder, and skin  Ongoing physician follow-up.  Weekly team conferences.  Goals are indeterminant.   Rehabilitation prognosis determined.  Medical prognosis determined.  Estimated length of stay is indeterminate.     TEAM CONF - AUGUST 6- TRANFERS CTG. GAIT UP  FEET CTG-MIN ASSIST. UBD MIN. LBD MOD. BATH MOD. SEVERE APHASIA. INCREASED RESISTANCE TO PARTICIPATE AT TIMES.   PUREE/HTL.  GOOD PO INTAKE. ADJUSTING MEDS FOR DIABETES MELLITUS.  BLADDER CONTINENT/INCONTIENT.   ELOS- 2 WEEKS.      TEAM JEFFREY - AUGUST 13 - DID GREAT WITH PT ON Saturday, FOLLOWING COMMANDS AND BATTING A BALL WITH ANOTHER PATIENT. YESTERDAY, VERY FRUSTRATED AND IRRITABLE.  FRUSTRATED BY APHASIA, TRYING TO TELL STAFF SOMETHING. WILL HOLD ON TO OBJECTS, REPEAT SINGLE WORD.   BED CTG. 4 STAIRS CTG.  GAIT 220 FEET CTG-SBA NO DEVICE.  TOILET TRANSFERS CTG-MIN.  GROOMING MIN.  UBD MIN ASSIST FOR BRA, LBD CTG-MIN. BATH CTG-MIN. COORDINATION RUE BETTER.  DYSPHAGIA - IMPROVED - LAI EDGAR NTL. DO NOT FEEL SHE WOULD TOLERATE E-STIM. RECEPTIVE LANGUAGE IMPROVED SLIGHTLY , POINTING TO OBJECTS. EXPRESSIVELY PERSEVERATIVE ON A SINGLE WORD, TRIES TO USE PICTURE BOARD TO COMMUNICATE. YES/NO NOT ACCURATE.  CONTINENT BOWEL AND BLADDER, TIMED VOIDS. SKIN INTACT. TYPICALLY GOOD INTAKE.  ELOS - 1.5 WEEKS     AUGUST 14 - In therapies - In OT, increased participation noted with  present   Transfers SBA/CTG  Walked from therapy gym to room without AD SBA and vc's for directions back to the  room   300' outdoors on sidewalk, incline; 300', 180', 100' up on rehab unit. Pt was able to find her room when she got close to it  Bathing, dressing, grooming min assist. Toileting moderate assist.  Pt participated in using R hand to manipualte round pegs into peg board by color with MIN difficutly once pt caught on to the task. with 3D block recreation with R hand with 100% color match, 80% accuracy with block recreation  With SLP, patient was not able to effectively communicate her wants/needs but refused to go to therapy office by planting her heels while rolling in wc down the [place]; tx session completed in her room      August 15-The patient was reviewed with .  Discussed adding on an antidepressant with anxiolytic properties -Paxil-as it appears frustration from her aphasia with associated anxiety with the frustration affects her performance.  We will plan on starting Paxil 10 mg daily tomorrow and monitor response.  Reviewed with the patient's  that would not add a short acting anxiolytic (such as a benzodiazepine or Seroquel) as it may affect her cognitive performance.    August 16-Patient was reviewed with her daughter today including rehabilitation goals, discharge planning, and recent medication adjustment to try to help with her anxiety/frustration with her aphasia.  Reviewed with the daughter that on discussion with the team is felt that she would do better with her functional status/aphasia/anxiety if able to be discharged to home environment with more frequent interactions but if that is not feasible with the need to look at subacute options.     TEAM CONF - AUGUST 20 - GAIT CTG- FEET. NO DEVICE, WALKS TO AND FROM GYM.  ADLS CTG WITH CUING.  PLAYED ENTIRE GAME OF CHECKERS YESTERDAY. VARIABLE PERFORMANCE WITH SPEECH THERAPY 20-90% MATCHING WORDS TO PICTURES.    MECH SOFT, GROUND MEAT, NECTAR THICK LIQUIDS.   TYPICALLY CONTINENT BLADDER BUT INCONTINENT BOWEL. DID FINE LAST EVENING  "PER SISTER AND DID NOT TRY TO GET UP- WILL DISCONTINUE SITTER. IN ROOM CLOSE TO NURSING STATION.   ELOS- FAMILY CONF LAST WEEK- WILL WORK TOWARD SUBACUTE PLACEMENT      August 23-upper body dressing set up, lower body dressing min assist.  Grooming supervision.  Transfers standby assist.  Ambulated 220 feet without a device contact-guard standby assist.  She spent in therapy this morning but not with physical therapy this afternoon     TEAM CONF - AUGUST 27 - CONTINUES WITH APHASIA. WORKED ON MELODIC INTONATION. BED SBA. 4 STAIRS CTG. GAIT 300 FEET SBA. TOILET TRANSFERS SBA. SHOWER TRANSFERS SBA. VOIDING ON OWN. UBD MIN ASSIST. LBD CTG-MIN. BATH CTG. DIET - MECH SOFT, GROUND MEATS, NECTAR THICK LIQUIDS. NOT ABLE TO MANAGE AT HOME IN SHORT TERM. WILL ASK PSYCHIATRY SEE IN FOLLOW UP FOR ANY FINAL ADJUSTMENTS IN REGIMEN BEFORE DISCHARGE TO SUBACUTE.  .  ELOS- DISPOSITION PENDING.      August 28 -  As patient's overall status appears stable and not making any new interventions at this time, allowing Paxil and opportunity to take effect, will look at discharge to subacute at [facility] today.  She will need follow-up assessments by the physicians at that facility to adjust her medication as needed.          Physical Exam     MENTAL STATUS -  AWAKE / ALERT.  NOT ANXIOUS PRESENTLY. SMILING.  HEENT- NCAT,   SCLERA NON-ICTERIC, CONJUNCTIVA PINK, OP MOIST, NO JVD   LUNGS - normal respirations.  Clear to auscultation  HEART- RRR   ABD -   non-distended  EXT - NO EDEMA OR CYANOSIS  NEURO -alert.  Aphasia.  She will get occasional single word such as 'yes\" or \"no\" out. Follows instructions.    MOTOR EXAM -takes resistance bilaterally.           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 There are stable areas of  encephalomalacia in the left parietal and occipital lobes medially.  Generalized " atrophy. There are moderately extensive scattered areas of  decreased density in the white matter likely related to chronic ischemic  gliotic changes.    No hydrocephalus.     Glucose   Date/Time Value Ref Range Status   08/29/2019 0728 153 (H) 70 - 130 mg/dL Final   08/28/2019 1057 208 (H) 70 - 130 mg/dL Final   08/28/2019 0704 92 70 - 130 mg/dL Final   08/27/2019 2044 194 (H) 70 - 130 mg/dL Final   08/27/2019 1631 77 70 - 130 mg/dL Final   08/27/2019 1617 64 (L) 70 - 130 mg/dL Final   08/27/2019 1113 138 (H) 70 - 130 mg/dL Final   08/27/2019 0726 125 70 - 130 mg/dL Final     Results from last 7 days   Lab Units 08/28/19  0711 08/26/19  0658 08/23/19  0631   SODIUM mmol/L 139 141 137   POTASSIUM mmol/L 3.7 3.6 3.6   CHLORIDE mmol/L 102 104 101   CO2 mmol/L 27.9 27.0 28.4   BUN mg/dL 20 20 17   CREATININE mg/dL 0.85 0.72 0.73   CALCIUM mg/dL 9.1 8.5* 9.1   BILIRUBIN mg/dL 0.3  --   --    ALK PHOS U/L 88  --   --    ALT (SGPT) U/L 13  --   --    AST (SGOT) U/L 19  --   --    GLUCOSE mg/dL 76 142* 108*     Results from last 7 days   Lab Units 08/28/19  0711 08/26/19  0658 08/23/19  0631   WBC 10*3/mm3 5.57 5.67 4.21   HEMOGLOBIN g/dL 9.7* 9.7* 9.5*   HEMATOCRIT % 30.5* 31.1* 30.6*   PLATELETS 10*3/mm3 254 270 306             Ref. Range 5/22/2019 05:44 5/22/2019 11:34 7/9/2019 08:25 7/18/2019 04:49 7/19/2019 05:05 7/23/2019 05:56 8/1/2019 06:48   Hemoglobin Latest Ref Range: 12.0 - 15.9 g/dL 10.8 (L)   10.6 (L) 8.5 (L) 9.7 (L) 9.3 (L) 7.4 (L)   Hematocrit Latest Ref Range: 34.0 - 46.6 % 35.1   33.4 (L) 26.2 (L) 29.9 (L) 28.6 (L) 23.6 (L)           Results from last 7 days   Lab Units 08/28/19  0711   CHOLESTEROL mg/dL 95   TRIGLYCERIDES mg/dL 65   HDL CHOL mg/dL 40   LDL CHOL mg/dL 42            Ref. Range 8/1/2019 06:47   25 Hydroxy, Vitamin D Latest Ref Range: 30.0 - 100.0 ng/ml 21.8 (L)        Ref. Range 8/1/2019 06:47   Total Cholesterol Latest Ref Range: 0 - 200 mg/dL 105   HDL Cholesterol Latest Ref Range: 40 -  60 mg/dL 40   LDL Cholesterol  Latest Ref Range: 0 - 100 mg/dL 57   VLDL Cholesterol Latest Ref Range: 5 - 40 mg/dL 8   Triglycerides Latest Ref Range: 0 - 150 mg/dL        Name Relationship Specialty Phone Fax Address Order              Eric Matta MD  PCP - General General Practice [phone] [address]  [facility] [address]     Next Steps: Follow up      Maine Starr, APRN   Neurology [phone] [address]  [address]  [place] [address]     Next Steps: Follow up      Instructions: CALL FOR APPOINTMENT      Greta Swan, APRN   Neurosurgery [phone] [address] [facility] [address]  [facility] [place] [address]     Next Steps: Follow up in 2 month(s)      Instructions: we will arrange        Inter-facility transfer medications (From admission, onward)            dextrose (D50W) 25 g/ 50mL Intravenous Solution 25 g  Every 15 Minutes PRN          dextrose (GLUTOSE) oral gel 15 g  Every 15 Minutes PRN          glucagon (human recombinant) (GLUCAGEN DIAGNOSTIC) injection 1 mg  As Needed          nitroglycerin (NITROSTAT) SL tablet 0.4 mg  Every 5 Minutes PRN          apixaban (ELIQUIS) tablet 5 mg  Every 12 Hours Scheduled          aspirin tablet 325 mg  Daily          atorvastatin (LIPITOR) tablet 80 mg  Nightly          brimonidine (ALPHAGAN) 0.2 % ophthalmic solution 1 drop  2 Times Daily          dorzolamide (TRUSOPT) 2 % ophthalmic solution 1 drop  2 Times Daily          losartan (COZAAR) 100 mg, hydrochlorothiazide (MICROZIDE) 12.5 mg for HYZAAR 100-12.5  Daily          nystatin (MYCOSTATIN) 773225 UNIT/ML suspension 500,000 Units  4 Times Daily          nebivolol (BYSTOLIC) tablet 20 mg  2 Times Daily          aluminum-magnesium hydroxide-simethicone (MAALOX MAX) 400-400-40 MG/5ML suspension 15 mL  Every 6 Hours PRN          metFORMIN (GLUCOPHAGE) tablet 1,000 mg  2 Times Daily With Meals          potassium chloride (KLOR-CON) packet 20 mEq  Daily With Breakfast           hydrALAZINE (APRESOLINE) tablet 50 mg  Every 8 Hours Scheduled          insulin lispro (humaLOG) injection 0-7 Units  (Correction Insulin - Low Dose (Total Insulin Dose Less Than 40 units/day, Lean Patients, Elderly Patients or Renal Patients))  4 Times Daily With Meals & Nightly          PARoxetine (PAXIL) tablet 10 mg  Daily          acetaminophen (TYLENOL) tablet 1,000 mg  Every 6 Hours PRN          amLODIPine (NORVASC) tablet 5 mg  2 Times Daily - RT          glipiZIDE (GLUCOTROL) tablet 5 mg  2 Times Daily Before Meals          OLANZapine (zyPREXA) injection 5 mg  Every 8 Hours PRN          pantoprazole (PROTONIX) EC tablet 40 mg  2 Times Daily Before Meals          melatonin tablet 3 mg  Nightly          vitamin D (ERGOCALCIFEROL) capsule 50,000 Units  Every 7 Days             Vitamin D next dose Thursday Sept 5, weekly x 3 doses.    Diet-consistent carb, regular with thins, no straws. She continues to need 1:1 supervision for meals  . She can take meds crushed in puree    Blood glucose check q AC and HS    >30 on discharge  Recheck CMP-liver enzymes and lipid panel on statin-atorvastatin the first week of October.  Recheck CBC every 2 weeks and BMP every 2 weeks.    >30 on discharge

## 2019-08-29 NOTE — PLAN OF CARE
"Problem: Patient Care Overview  Goal: Plan of Care Review  Outcome: Unable to achieve outcome(s) by discharge Date Met: 08/29/19 08/29/19 6279   Patient Care Overview   IRF Plan of Care Review unable to achieve expected outcomes;discharged   Progress, Functional Goals progress more gradual than expected   Coping/Psychosocial   Plan of Care Reviewed With patient   OTHER   Outcome Summary Pt made slow, inconsistent progress during inpatient stay due to fluctuating attention and participation. 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 Suspect moderate-severe underlying cognitive impairment. With melodic intonation technique, she was able to produce basic phrases in unison with clinician. She was able to count 1-10 and perform phrase completion with MAX cues. She exhibited improved auditory comprehension for personal yes/no questions, requiring NO cues (x1); however, she was unable to answer simple yes/no questions despite MAX cues. \"Yes\" bias noted when reading simple yes/no questions (x1). Pt was able to copy her name with NO cues (x1); additional graphic expression was characterized by single word perseverations. Recommend further speech therapy at next level of care. Pt requires 24 hour supervision due to impulsivity, impaired communication, confusion and decreased safety awareness. Pt was tolerating regular with thins (no straws), with 1:1 supervision for meals due to impulsivity.         "

## 2019-08-29 NOTE — THERAPY DISCHARGE NOTE
Inpatient Rehabilitation - Physical Therapy Treatment Note/Discharge  [facility]     Patient Name: Darby Workman  : 1945  MRN: [id number]  Today's Date: 2019             Admit Date: 2019    Visit Dx:  No diagnosis found.  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)   • Right-sided headache   • Acute ischemic stroke (CMS/HCC)   • Embolic stroke (CMS/HCC)   • Cerebral infarction due to stenosis of left carotid artery (CMS/HCC)   • Anticoagulated by anticoagulation treatment   • Stroke (cerebrum) (CMS/HCC)       Physical Therapy Education     Title: PT OT SLP Therapies (Not Started)     Topic: Physical Therapy (In Progress)     Point: Mobility training (In Progress)     Learning Progress Summary           Patient Nonacceptance, E,TB,D, NR by GREGORY at 2019  9:45 AM    Acceptance, E,D, NR by NESTOR at 2019  1:50 PM    Acceptance, E, NR,NL by JHONNY at 2019 11:08 PM    Acceptance, E,TB, NR by KELBY at 2019 12:11 PM    Nonacceptance, E,D, NR by DONI at 2019 11:44 AM    Acceptance, E,TB, VU,NR by KELBY at 2019  3:15 PM    Nonacceptance, E,TB,D, NR by ENID at 2019 12:00 PM    Acceptance, E,D, NR by NESTOR at 8/3/2019  1:35 PM    Acceptance, D, DU,NR by NESTOR at 8/3/2019  8:56 AM                   Point: Home exercise program (In Progress)     Learning Progress Summary           Patient Acceptance, E, NR,NL by JHONNY at 2019 11:08 PM    Nonacceptance, E,D, NR by DONI at 2019 11:44 AM    Nonacceptance, E,TB,D, NR by ENID at 2019 12:00 PM                   Point: Body mechanics (In Progress)     Learning Progress Summary           Patient Acceptance, E, NR,NL by JHONNY at 2019 11:08 PM    Nonacceptance, E,D, NR by DONI at 2019 11:44 AM                   Point: Precautions (In Progress)     Learning Progress Summary            Patient Nonacceptance, E,D, NR,NL by MD at 8/26/2019 11:30 AM    Acceptance, E, NR,NL by WN at 8/19/2019 11:08 PM    Nonacceptance, E,D, NR by DONI at 8/17/2019 11:44 AM    Acceptance, E, NR by MD at 8/16/2019  8:36 AM    Acceptance, E, NR by MD at 8/15/2019  8:59 AM    Acceptance, E, NR by MD at 8/13/2019 11:14 AM    Acceptance, E, NR by MD at 8/12/2019 10:45 AM    Acceptance, E, NR by MD at 8/10/2019 11:15 AM    Acceptance, E, NR by MD at 8/9/2019 11:12 AM    Acceptance, E, NR by MD at 8/8/2019 11:48 AM    Acceptance, E, NR by MD at 8/6/2019 10:41 AM    Acceptance, E, NR by MD at 8/2/2019 10:44 AM    Acceptance, E,D, NR by MD at 8/1/2019 12:16 PM                               User Key     Initials Effective Dates Name Provider Type Discipline     06/08/18 -  Poppy Rosa, PT Physical Therapist PT    JS 04/06/17 -  Steph Salinas, PT Physical Therapist PT    LH 04/03/18 -  Laura Foster, PT Physical Therapist PT    JK 04/03/18 -  Nicole Austin, PT Physical Therapist PT    MD 04/03/18 -  Mira Chadwick, PT Physical Therapist PT    KP 04/03/18 -  Marycruz Schmitt, PT Physical Therapist PT    WN 06/24/19 -  Luis Abernathy, RN Registered Nurse Nurse              Rehab Goal Summary     Row Name 08/29/19 0900             Words/Phrases/Sentences Goal 1 (SLP)    Improve Ability to Comprehend Words/Phrases/Sentences Through: Goal 1 (SLP)  identify objects, field of;other (comment) 12  -OC      Progress/Outcomes (Identify Objects and Pictures Goal 1, SLP)  continuing progress toward goal  -OC      Comment (Words/Phrases/Sentences Goal 1, SLP)  able to ID 4 trees, cups, and lamps- required extended time  -OC         Comprehend Questions Goal 1 (SLP)    Improve Ability to Comprehend Questions Goal 1 (SLP)  simple yes/no questions  -OC      Progress (Ability to Comprehend Questions Goal 1, SLP)  other (comment) yes bias, frustrated with SLP when attempted cueing  -OC        User Key  (r) = Recorded By, (t) = Taken  By, (c) = Cosigned By    Initials Name Provider Type Discipline    OC Candace Ohara MA,CCC-SLP Speech and Language Pathologist SLP        Therapy Treatment  Rehabilitation Treatment Summary     Row Name                Wound 07/17/19 1015 Left neck incision    Wound - Properties Group Date first assessed: 07/17/19 [LD] Time first assessed: 1015 [LD] Side: Left [LD] Location: neck [LD] Type: incision [LD] Recorded by:  [LD] Martha Foster RN 07/17/19 1015      User Key  (r) = Recorded By, (t) = Taken By, (c) = Cosigned By    Initials Name Effective Dates Discipline    LD Martha Foster RN 06/16/16 -  Nurse        Wound 07/17/19 1015 Left neck incision (Active)   Dressing Appearance open to air 8/29/2019  8:00 AM   Closure Liquid skin adhesive 8/29/2019  8:00 AM   Base clean;dry 8/29/2019  8:00 AM   Periwound intact 8/29/2019  8:00 AM   Periwound Temperature warm 8/29/2019  8:00 AM   Periwound Skin Turgor soft 8/29/2019  8:00 AM   Drainage Amount none 8/29/2019  8:00 AM       PT Recommendation and Plan               Time Calculation:   PT Charges     Row Name 08/29/19 1329             Time Calculation    PT - Next Appointment  08/30/19  -NESTOR        User Key  (r) = Recorded By, (t) = Taken By, (c) = Cosigned By    Initials Name Provider Type    Nicole Ayon, PT Physical Therapist                 PT Discharge Summary  Reason for Discharge: Change in medical status  Outcomes Achieved: Able to achieve all goals within established timeline  Discharge Destination: other (comment)([facility] due to behavioral changes)    Nicole Austin, PT  8/29/2019

## 2019-08-29 NOTE — SIGNIFICANT NOTE
8/29/19  Patient became very disoriented and aggressive towards staff this morning.  Staff informed patient that because she was a high falls risk we could not leave her sitting on the edge of the bed.  We could not turn on the bed alarm with her sitting on the edge of the bed and she refused to let us put anything such as a wheelchair or table in front of her.  She also refused to let us turn off the bed alarm when she set it off earlier by holding the latch at the bottom of the bed closed with her hands.  She started to start to kick, hit, and tried to bite people.  Security was called at 0604 and Zyprexa was eventually given at 0632. Patient did eventually become calmer but was still uncooperative.

## 2019-08-29 NOTE — PROGRESS NOTES
Inpatient Rehabilitation Functional Measures Assessment    Functional Measures  KATI Eating:  [facility] Grooming: [facility] Bathing:  [facility] Upper Body Dressing:  [facility] Lower Body Dressing:  [facility] Toileting:  [facility] Bladder Management  Level of Assistance:  [place]  Frequency/Number of Accidents this Shift:  [facility] Bowel Management  Level of Assistance: [place]  Frequency/Number of Accidents this Shift: [facility] Bed/Chair/Wheelchair Transfer:  [facility] Toilet Transfer:  [facility] Tub/Shower Transfer:  [place]    Previously Documented Mode of Locomotion at Discharge: Field  KATI Expected Mode of Locomotion at Discharge: [facility] Walk/Wheelchair:  [facility] Stairs:  [facility] Comprehension:  Auditory comprehension is the usual mode. Patient does not  comprehend complex/abstract information in their primary language without  assistance from a helper. Comprehension Score = 2, Maximal Prompting. Patient  comprehends basic daily needs 25-49% of the time. Patient understands simple  information via single words or gestures. Requires maximal/a lot of prompting  (most of the time). No assistive devices were required.  KATI Expression:  Vocal expression is the usual mode. Patient does not express  complex/abstract information in their primary language without a helper.  Expression Score = 2, Maximal Prompting. Patient expresses basic daily needs  25-49% of the time. Patient uses only single words or gestures and requires  maximal/a lot of prompting (most of the time). No assistive devices were  required.  KATI Social Interaction:  [facility] Problem Solving:  Activity was not observed. Not assessed due to severity of  aphasia.  KATI Memory:  Activity was not observed. Not assessed due to severity of aphasia.      Therapy Mode Minutes  Occupational Therapy: [place]  Physical Therapy: [place]  Speech Language Pathology:  Individual: 0 minutes.    Signed by: Lela Bueno, SLP

## 2019-08-29 NOTE — PROGRESS NOTES
SECTION GG    Eating Performance Discharge: [place] provides verbal cues and/or  touching/steadying and/or contact guard assistance as patient completes  activity.    Signed by: Trupti Viramontes RN

## 2019-08-29 NOTE — THERAPY DISCHARGE NOTE
"Inpatient Rehabilitation - Speech Language Pathology Discharge Summary  [facility]       Patient Name: Darby Workman  : 1945  MRN: [id number]    Today's Date: 2019                   Admit Date: 2019      SLP Recommendation and Plan    Pt made slow, inconsistent progress during inpatient stay due to fluctuating attention and participation. 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 Suspect moderate-severe underlying cognitive impairment. With melodic intonation technique, she was able to produce basic phrases in unison with clinician. She was able to count 1-10 and perform phrase completion with MAX cues. She exhibited improved auditory comprehension for personal yes/no questions, requiring NO cues (x1); however, she was unable to answer simple yes/no questions despite MAX cues.  \"Yes\" bias noted when reading simple yes/no questions (x1). Pt was able to copy her name with NO cues (x1); additional graphic expression was characterized by single word perseverations. Recommend further speech therapy at next level of care. Pt requires 24 hour supervision due to impulsivity, impaired communication, confusion and decreased safety awareness.        Visit Dx:  No diagnosis found.      Time Calculation- SLP     Row Name 19 1259             Time Calculation- SLP    SLP Start Time  0830  -OC      SLP Stop Time  0900  -OC      SLP Time Calculation (min)  30 min  -OC        User Key  (r) = Recorded By, (t) = Taken By, (c) = Cosigned By    Initials Name Provider Type    OC Lev, VERONICA Maria,[facility]-SLP Speech and Language Pathologist            SLP GOALS     Row Name 19 0900 19 1430 19 0830       Oral Nutrition/Hydration Goal 1 (SLP)    Barriers (Oral Nutrition/Hydration Goal 1, SLP)  --  --  Pt observed with trials of regular, mixed, puree and thins by cup (refused thins by straw). No overt s/s of aspiration or penetration or " "penetration observed with any consistency. Trace oral residue observed with mixed. Recommend change diet to regular with thins (no straws), 1:1 supervision for meals.  -AL    Progress/Outcomes (Oral Nutrition/Hydration Goal 1, SLP)  --  --  good progress toward goal  -AL       Words/Phrases/Sentences Goal 1 (SLP)    Improve Ability to Comprehend Words/Phrases/Sentences Through: Goal 1 (SLP)  identify objects, field of;other (comment) 12  -OC  --  --    Progress/Outcomes (Identify Objects and Pictures Goal 1, SLP)  continuing progress toward goal  -OC  good progress toward goal  -AL  --    Comment (Words/Phrases/Sentences Goal 1, SLP)  able to ID 4 trees, cups, and lamps- required extended time  -OC  Pt counted 1-10 with 50-70% accuracy in unison with clinician. She stated 5/5 functional phrases using melodic intonation with MAX Cues. She produced 4/5 one-word phrase completions (opposites) in unison with clinician and with written cues. She did not sing unison with clinician today; stated \"no.\"  -AL  --       Comprehend Questions Goal 1 (SLP)    Improve Ability to Comprehend Questions Goal 1 (SLP)  simple yes/no questions  -OC  --  --    Progress (Ability to Comprehend Questions Goal 1, SLP)  other (comment) yes bias, frustrated with SLP when attempted cueing  -OC  --  --       Word Retrieval Skills Goal 2 (SLP)    Progress/Outcomes (Word Retrieval Goal 2, SLP)  --  --  good progress toward goal  -AL    Comment (Word Retrieval Goal 2, SLP)  --  --  Pt produced 2/3 functional phrases using melodic intonation with MAX cues; approximated the 3rd phrase.   -AL    Row Name 08/27/19 1230 08/27/19 1100          Oral Nutrition/Hydration Goal 1 (SLP)    Barriers (Oral Nutrition/Hydration Goal 1, SLP)  Pt exhibited no s/s of aspiration or penetration in 5/5 trials of water via cup. Plan to observe again with meal tomorrow prior to upgrading diet.  -AL  Pt observed with breakfast meal of mechanical soft/no mixed and thin " liquid (water). She refused NTL this AM. No overt s/s of aspiration or penetration observed with any consistency. Minimal right labial residue observed, requiring cues to clear. No oral residue observed. Plan to observe again with meal of mechanical soft/ no mixed with thins prior to upgrading.  -AL     Progress/Outcomes (Oral Nutrition/Hydration Goal 1, SLP)  good progress toward goal  -AL  good progress toward goal  -AL        Comprehend Questions Goal 1 (SLP)    Progress/Outcomes (Comprehend Questions Goal 1, SLP)  good progress toward goal  -AL  --     Comment (Comprehend Questions Goal 1, SLP)  Personal yes/no questions: 100% with NO cues. Met x1.  -AL  --        Word Retrieval Skills Goal 1 (SLP)    Progress/Outcomes (Word Retrieval Goal 1, SLP)  good progress toward goal  -AL  --     Comment (Word Retrieval Goal 1, SLP)  Pt stated her first and last name and her 's name with MAX cues. She approximated her daughter's name with MAX cues. Using melodic intonation technique, pt produced 1/5 funtional phrases with MIN cues and 5/5 with MAX cues. Confrontation naming of basic objects: 20% with NO cues, 40% with MOD cues, 100% with MAX cues; apraxia impacted task. Pt counted 1-10 with 20% accuracy with MIN cues, 80% with MAX cues. She stated RAJAT with 100% accuracy with MAX cues.  -AL  Pt was unable to state functions of objects despite MAX cues; however, appropriate attempt observed x1.  She sang Amazing Angie in unison with clinician with ~50% accuracy when cued to watch clinician's mouth.  -AL        Graphic Expression of Words Goal 1 (SLP)    Progress/Outcomes (Graphic Expression of Single Words Goal 1, SLP)  --  good progress toward goal  -AL     Comment (Graphic Expression of Single Words Goal 1, SLP)  --  Pt copied her full name with NO cues. She was unable to copy her 's name. Appeared to attempt to write her daughter's name, but could not be cued to accuracy.  -AL       User Key  (r) = Recorded  By, (t) = Taken By, (c) = Cosigned By    Initials Name Provider Type    Candace Roman MA,CCC-SLP Speech and Language Pathologist    Lela Holley, SLP Speech and Language Pathologist            Therapy Charges for Today     Code Description Service Date Service Provider Modifiers Qty    35553260245 HC ST TREATMENT SWALLOW 2 8/28/2019 Lela Bueno, SLP GN 1    [id number]  ST TREATMENT SPEECH 2 8/28/2019 Lela Bueno, SLP GN 1                   WHIT Tavarez  8/29/2019

## 2019-08-29 NOTE — PROGRESS NOTES
Inpatient Rehabilitation Functional Measures Assessment    Functional Measures  KATI Eating:  [place]  [facility] Grooming: [place]  [facility] Bathing:  [place]  [facility] Upper Body Dressing:  [facility] Lower Body Dressing:  [facility] Toileting:  [facility] Bladder Management  Level of Assistance:  [place]  Frequency/Number of Accidents this Shift:  [facility] Bowel Management  Level of Assistance: [place]  Frequency/Number of Accidents this Shift: [facility] Bed/Chair/Wheelchair Transfer:  Bed/chair/wheelchair Transfer did not occur  because patient refused. .  [facility] Toilet Transfer:  [facility] Tub/Shower Transfer:  [place]    Previously Documented Mode of Locomotion at Discharge: Field  KATI Expected Mode of Locomotion at Discharge: [facility] Walk/Wheelchair:  WHEELCHAIR OBSERVATION   Wheelchair did not occur because patient refused.    WALK OBSERVATION   Walking did not occur because patient refused.  KATI Stairs:  Stairs did not occur because patient refused.    KATI Comprehension:  [facility] Expression:  [facility] Social Interaction:  [facility] Problem Solving:  [facility] Memory:  [place]    Therapy Mode Minutes  Occupational Therapy: [place]  Physical Therapy: Individual: 0 minutes.  Speech Language Pathology:  [place]    Signed by: Nicole Austin, PT

## 2019-08-29 NOTE — PROGRESS NOTES
Inpatient Rehabilitation Functional Measures Assessment    Functional Measures  KATI Eating:  [facility] Grooming: [facility] Bathing:  [facility] Upper Body Dressing:  [facility] Lower Body Dressing:  [facility] Toileting:  [facility] Bladder Management  Level of Assistance:  [facility]  Frequency/Number of Accidents this Shift:  [facility] Bowel Management  Level of Assistance: [facility]  Frequency/Number of Accidents this Shift: [facility] Bed/Chair/Wheelchair Transfer:  [facility] Toilet Transfer:  [facility] Tub/Shower Transfer:  [facility]    Previously Documented Mode of Locomotion at Discharge: Field  KATI Expected Mode of Locomotion at Discharge: [facility] Walk/Wheelchair:  [facility] Stairs:  [facility] Comprehension:  Both ( auditory and visual) modes of comprehension are used  equally. Patient does not comprehend complex/abstract information in their  primary language without assistance from a helper. Comprehension Score = 1,  Total Assistance.  Patient understands basic daily needs less than 25% of the  time and/or does not understand simple information such as single words or  gestures. Patient requires the following assistive device(s): Glasses.  KATI Expression:  Non-vocal expression is the usual mode. Patient does not  express complex/abstract information in their primary language without a helper.  Expression Score = 1, Total Assistance. Patient expresses basic daily needs less  than 25% of the time, or does not express basic needs appropriately or  consistently despite prompting. No assistive devices were required.  KATI Social Interaction:  Activity was not observed.  [facility]ATI Problem Solving:  Activity was not observed.  KATI Memory:  Activity was not observed.    Therapy Mode Minutes  Occupational Therapy: [facility]  Physical Therapy: [facility]  Speech Language Pathology:  [facility]    Signed by: Trupti Viramontes RN

## 2019-08-29 NOTE — THERAPY TREATMENT NOTE
Inpatient Rehabilitation - Speech Language Pathology Treatment Note  [facility]     Patient Name: Darby Workman  : 1945  MRN: [id number]  Today's Date: 2019         Admit Date: 2019    Visit Dx:    No diagnosis found.  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)   • Right-sided headache   • Acute ischemic stroke (CMS/HCC)   • Embolic stroke (CMS/HCC)   • Cerebral infarction due to stenosis of left carotid artery (CMS/HCC)   • Anticoagulated by anticoagulation treatment   • Stroke (cerebrum) (CMS/HCC)        Therapy Treatment  Rehabilitation Treatment Summary     Row Name                Wound 19 1015 Left neck incision    Wound - Properties Group Date first assessed: 19 [LD] Time first assessed:  [LD] Side: Left [LD] Location: neck [LD] Type: incision [LD] Recorded by:  [LD] Martha Foster RN 19 1015      User Key  (r) = Recorded By, (t) = Taken By, (c) = Cosigned By    Initials Name Effective Dates Discipline    LD Martha Foster, RN 16 -  Nurse          EDUCATION  The patient has been educated in the following areas:   Cognitive Impairment.    SLP Recommendation and Plan                         SLP GOALS     Row Name 19 0900 19 1430 19 0830       Oral Nutrition/Hydration Goal 1 (SLP)    Barriers (Oral Nutrition/Hydration Goal 1, SLP)  --  --  Pt observed with trials of regular, mixed, puree and thins by cup (refused thins by straw). No overt s/s of aspiration or penetration or penetration observed with any consistency. Trace oral residue observed with mixed. Recommend change diet to regular with thins (no straws), 1:1 supervision for meals.  -AL    Progress/Outcomes (Oral Nutrition/Hydration Goal 1, SLP)  --  --  good progress toward goal  -AL       Words/Phrases/Sentences  "Goal 1 (SLP)    Improve Ability to Comprehend Words/Phrases/Sentences Through: Goal 1 (SLP)  identify objects, field of;other (comment) 12  -OC  --  --    Progress/Outcomes (Identify Objects and Pictures Goal 1, SLP)  continuing progress toward goal  -OC  good progress toward goal  -AL  --    Comment (Words/Phrases/Sentences Goal 1, SLP)  able to ID 4 trees, cups, and lamps- required extended time  -OC  Pt counted 1-10 with 50-70% accuracy in unison with clinician. She stated 5/5 functional phrases using melodic intonation with MAX Cues. She produced 4/5 one-word phrase completions (opposites) in unison with clinician and with written cues. She did not sing unison with clinician today; stated \"no.\"  -AL  --       Comprehend Questions Goal 1 (SLP)    Improve Ability to Comprehend Questions Goal 1 (SLP)  simple yes/no questions  -OC  --  --    Progress (Ability to Comprehend Questions Goal 1, SLP)  other (comment) yes bias, frustrated with SLP when attempted cueing  -OC  --  --       Word Retrieval Skills Goal 2 (SLP)    Progress/Outcomes (Word Retrieval Goal 2, SLP)  --  --  good progress toward goal  -AL    Comment (Word Retrieval Goal 2, SLP)  --  --  Pt produced 2/3 functional phrases using melodic intonation with MAX cues; approximated the 3rd phrase.   -AL    Row Name 08/27/19 1230 08/27/19 1100          Oral Nutrition/Hydration Goal 1 (SLP)    Barriers (Oral Nutrition/Hydration Goal 1, SLP)  Pt exhibited no s/s of aspiration or penetration in 5/5 trials of water via cup. Plan to observe again with meal tomorrow prior to upgrading diet.  -AL  Pt observed with breakfast meal of mechanical soft/no mixed and thin liquid (water). She refused NTL this AM. No overt s/s of aspiration or penetration observed with any consistency. Minimal right labial residue observed, requiring cues to clear. No oral residue observed. Plan to observe again with meal of mechanical soft/ no mixed with thins prior to upgrading.  -AL     " Progress/Outcomes (Oral Nutrition/Hydration Goal 1, SLP)  good progress toward goal  -AL  good progress toward goal  -AL        Comprehend Questions Goal 1 (SLP)    Progress/Outcomes (Comprehend Questions Goal 1, SLP)  good progress toward goal  -AL  --     Comment (Comprehend Questions Goal 1, SLP)  Personal yes/no questions: 100% with NO cues. Met x1.  -AL  --        Word Retrieval Skills Goal 1 (SLP)    Progress/Outcomes (Word Retrieval Goal 1, SLP)  good progress toward goal  -AL  --     Comment (Word Retrieval Goal 1, SLP)  Pt stated her first and last name and her 's name with MAX cues. She approximated her daughter's name with MAX cues. Using melodic intonation technique, pt produced 1/5 funtional phrases with MIN cues and 5/5 with MAX cues. Confrontation naming of basic objects: 20% with NO cues, 40% with MOD cues, 100% with MAX cues; apraxia impacted task. Pt counted 1-10 with 20% accuracy with MIN cues, 80% with MAX cues. She stated RAJAT with 100% accuracy with MAX cues.  -AL  Pt was unable to state functions of objects despite MAX cues; however, appropriate attempt observed x1.  She sang Amazing Angie in unison with clinician with ~50% accuracy when cued to watch clinician's mouth.  -AL        Graphic Expression of Words Goal 1 (SLP)    Progress/Outcomes (Graphic Expression of Single Words Goal 1, SLP)  --  good progress toward goal  -AL     Comment (Graphic Expression of Single Words Goal 1, SLP)  --  Pt copied her full name with NO cues. She was unable to copy her 's name. Appeared to attempt to write her daughter's name, but could not be cued to accuracy.  -AL       User Key  (r) = Recorded By, (t) = Taken By, (c) = Cosigned By    Initials Name Provider Type    Candace Roman MA,[facility]-SLP Speech and Language Pathologist    Lela Holley, SLP Speech and Language Pathologist              Time Calculation:     Time Calculation- SLP     Row Name 08/29/19 9448             Time Calculation- SLP     SLP Start Time  0830  -OC      SLP Stop Time  0900  -OC      SLP Time Calculation (min)  30 min  -OC        User Key  (r) = Recorded By, (t) = Taken By, (c) = Cosigned By    Initials Name Provider Type    Candace Roman MA,[facility]-SLP Speech and Language Pathologist          Therapy Charges for Today     Code Description Service Date Service Provider Modifiers Qty    [id number]  ST TREATMENT SPEECH 2 8/29/2019 Candace Ohara MA,CCC-SLP GN 1                     Candace Ohara MA,[facility]-SLP  8/29/2019

## 2019-08-29 NOTE — PROGRESS NOTES
Inpatient Rehabilitation Plan of Care Note    Plan of Care  Care Plan Reviewed - Updates as Follows    Body Systems    [RN] Integumentary(Active)  Current Status(08/20/2019): Incision L neck glued. Skin is intact.  Weekly Goal(08/30/2019): No S&S infection noted  Discharge Goal: No S&S infection noted Skin is intact.    Performed Intervention(s)  Daily skin inspection      Psychosocial    [RN] Coping/Adjustment(Active)  Current Status(08/20/2019): Pt. is non-verbal; Supportive   Weekly Goal(08/30/2019): Identify progress in functional status  Discharge Goal: Demonstrate healthy coping strategies    Performed Intervention(s)  Assist patient to express needs and concerns  calm enviroment      Safety    [RN] Potential for Injury(Active)  Current Status(08/20/2019): Impulsive; aphasic; does not use call light.  Irritable/ combative/ agitated at times.  Weekly Goal(08/30/2019): Cue to use call light  Discharge Goal: Pt/family will be aware of risk of fall and safety in the home  setting    Performed Intervention(s)  Bed alarm, wc alarm  Safety rounds  Items within reach      Sphincter Control    [RN] Bladder Management(Active)  Current Status(08/21/2019): incontinent bladder 25%. Urine positive for ESBL  Weekly Goal(08/30/2019): continent 75%  Discharge Goal: continent 100%    [RN] Bowel Management(Active)  Current Status(08/21/2019): incontinent bowel 100%  Weekly Goal(08/30/2019): continent bowel 50%  Discharge Goal: continent bowel 100%    Performed Intervention(s)  Monitor intake and output  Encourage fluid intake  Elimination schedule  contact isolation    Signed by: Jenna Corona RN

## 2019-08-29 NOTE — PAYOR COMM NOTE
"Good morning!    AUTH # [id number]    This fax is intended to request an URGENT EXPEDITED APPEAL of a denial for the member listed below, who is currently admitted to our acute rehab. Please call  or .     Thank you!    Star Capps RN    Noman Workman (74 y.o. Female)     Date of Birth Social Security Number Address Home Phone MRN    1945  [id number] [address] [phone] [id number]    [employer] Marital Status          [place]        Admission Date Admission Type Admitting Provider Attending Provider Department, Room/Bed    7/31/19 Elective Daniel Howard MD Gormley, John Michael, MD [facility], [facility]/    Discharge Date Discharge Disposition Discharge Destination                       Attending Provider:  Daniel Howard MD    Allergies:  No Known Allergies    Isolation:  Contact   Infection:  ESBL E coli (08/20/19)   Code Status:  CPR    Ht:  165.1 cm (65\")   Wt:  60.9 kg (134 lb 3.2 oz)    Admission Cmt:  None   Principal Problem:  None                Active Insurance as of 7/31/2019     Primary Coverage     Payor Plan Insurance Group Employer/Plan Group    [facility] [id number]     Payor Plan Address Payor Plan Phone Number Payor Plan Fax Number Effective Dates    [address]   4/1/2018 - None Entered    [facility] [phone]       Subscriber Name Subscriber Birth Date Member ID       DANIEL WORKMAN 2/6/1946 [id number]           Secondary Coverage     Payor Plan Insurance Group Employer/Plan Group    MEDICARE MEDICARE A ONLY      Payor Plan Address Payor Plan Phone Number Payor Plan Fax Number Effective Dates    [address] [phone]  10/1/2010 - None Entered    [facility] [id number]       Subscriber Name Subscriber Birth Date Member ID       NOMAN WORKMAN 1945 [id number]                 Emergency Contacts      (Rel.) Home Phone Work Phone Mobile Phone "    eder mcdonald (Daughter) [phone]    Ajit Mcdonald (Spouse) [phone] -- --

## 2019-08-30 LAB
GLUCOSE BLDC GLUCOMTR-MCNC: 152 MG/DL (ref 70–130)
GLUCOSE BLDC GLUCOMTR-MCNC: 47 MG/DL (ref 70–130)
GLUCOSE BLDC GLUCOMTR-MCNC: 63 MG/DL (ref 70–130)
GLUCOSE BLDC GLUCOMTR-MCNC: 73 MG/DL (ref 70–130)
GLUCOSE BLDC GLUCOMTR-MCNC: 91 MG/DL (ref 70–130)
GLUCOSE BLDC GLUCOMTR-MCNC: 96 MG/DL (ref 70–130)

## 2019-08-30 PROCEDURE — 92610 EVALUATE SWALLOWING FUNCTION: CPT

## 2019-08-30 PROCEDURE — 82962 GLUCOSE BLOOD TEST: CPT

## 2019-08-30 PROCEDURE — 97166 OT EVAL MOD COMPLEX 45 MIN: CPT

## 2019-08-30 PROCEDURE — 97161 PT EVAL LOW COMPLEX 20 MIN: CPT

## 2019-08-30 PROCEDURE — 63710000001 INSULIN LISPRO (HUMAN) PER 5 UNITS: Performed by: PHYSICAL MEDICINE & REHABILITATION

## 2019-08-30 PROCEDURE — 97535 SELF CARE MNGMENT TRAINING: CPT

## 2019-08-30 RX ADMIN — POTASSIUM CHLORIDE 20 MEQ: 1.5 POWDER, FOR SOLUTION ORAL at 08:29

## 2019-08-30 RX ADMIN — DORZOLAMIDE HYDROCHLORIDE 1 DROP: 20 SOLUTION/ DROPS OPHTHALMIC at 08:29

## 2019-08-30 RX ADMIN — PANTOPRAZOLE SODIUM 40 MG: 40 TABLET, DELAYED RELEASE ORAL at 18:02

## 2019-08-30 RX ADMIN — METFORMIN HYDROCHLORIDE 1000 MG: 1000 TABLET ORAL at 08:29

## 2019-08-30 RX ADMIN — GLIPIZIDE 5 MG: 5 TABLET ORAL at 08:29

## 2019-08-30 RX ADMIN — BRIMONIDINE TARTRATE 1 DROP: 2 SOLUTION OPHTHALMIC at 21:49

## 2019-08-30 RX ADMIN — APIXABAN 5 MG: 5 TABLET, FILM COATED ORAL at 21:49

## 2019-08-30 RX ADMIN — PANTOPRAZOLE SODIUM 40 MG: 40 TABLET, DELAYED RELEASE ORAL at 08:29

## 2019-08-30 RX ADMIN — PAROXETINE HYDROCHLORIDE 10 MG: 10 TABLET, FILM COATED ORAL at 08:29

## 2019-08-30 RX ADMIN — BRIMONIDINE TARTRATE 1 DROP: 2 SOLUTION OPHTHALMIC at 08:29

## 2019-08-30 RX ADMIN — AMLODIPINE BESYLATE 5 MG: 5 TABLET ORAL at 21:50

## 2019-08-30 RX ADMIN — ASPIRIN 325 MG: 325 TABLET ORAL at 08:29

## 2019-08-30 RX ADMIN — ATORVASTATIN CALCIUM 80 MG: 80 TABLET, FILM COATED ORAL at 21:49

## 2019-08-30 RX ADMIN — Medication 3 MG: at 21:49

## 2019-08-30 RX ADMIN — INSULIN LISPRO 2 UNITS: 100 INJECTION, SOLUTION INTRAVENOUS; SUBCUTANEOUS at 21:53

## 2019-08-30 RX ADMIN — APIXABAN 5 MG: 5 TABLET, FILM COATED ORAL at 08:29

## 2019-08-30 RX ADMIN — CLOPIDOGREL 75 MG: 75 TABLET, FILM COATED ORAL at 08:29

## 2019-08-30 RX ADMIN — METFORMIN HYDROCHLORIDE 1000 MG: 1000 TABLET ORAL at 18:02

## 2019-08-30 RX ADMIN — DORZOLAMIDE HYDROCHLORIDE 1 DROP: 20 SOLUTION/ DROPS OPHTHALMIC at 21:49

## 2019-08-30 RX ADMIN — DEXTROMETHORPHAN HYDROBROMIDE AND QUINIDINE SULFATE 1 CAPSULE: 20; 10 CAPSULE, GELATIN COATED ORAL at 12:51

## 2019-08-30 NOTE — PAYOR COMM NOTE
"*Please note that system character limits require multiple faxes - this one contains rehab DC Summary and SLP progress notes*    Good morning!    AUTH # [id number]    Please consider the included clinical documentation as an appeal for coverage for acute rehab on August 27th and 28th. The patient was increasingly refusing treatment, becoming agitated and aggressive with staff, and was transferred to the behavioral health unit yesterday, 8/29. If you have any questions please do not hesitate to call.     Star Capps, RN  [phone]    Darby Workman (74 y.o. Female)     Date of Birth Social Security Number Address Home Phone MRN    1945  [facility] [address] [phone] [id number]    Hindu Marital Status          Uatsdin        Admission Date Admission Type Admitting Provider Attending Provider Department, Room/Bed    7/31/19 Elective Daniel Howard MD  [facility], [facility]3/1    Discharge Date Discharge Disposition Discharge Destination        8/29/2019 Home or Self Care              Attending Provider:  (none)   Allergies:  No Known Allergies    Isolation:  Contact   Infection:  ESBL E coli (08/20/19)   Code Status:  CPR    Ht:  165.1 cm (65\")   Wt:  60.9 kg (134 lb 3.2 oz)    Admission Cmt:  None   Principal Problem:  None                Active Insurance as of 7/31/2019     Primary Coverage     Payor Plan Insurance Group Employer/Plan Group    [facility] [id number]     Payor Plan Address Payor Plan Phone Number Payor Plan Fax Number Effective Dates    [address]   4/1/2018 - None Entered    [facility] [id number]       Subscriber Name Subscriber Birth Date Member ID       DANIEL WORKMAN 2/6/1946 [id number]           Secondary Coverage     Payor Plan Insurance Group Employer/Plan Group    MEDICARE MEDICARE A ONLY      Payor Plan Address Payor Plan Phone Number Payor Plan Fax Number Effective Dates    [address] [phone]  10/1/2010 - None Entered    [facility] [id number]       Subscriber Name Subscriber Birth Date Member ID       NOMAN MCDONALD 1945 [id number]                 Emergency Contacts      (Rel.) Home Phone Work Phone Mobile Phone    eder mcdonald (Daughter) [phone]    Ajit Mcdonald (Spouse) [phone] -- --               Speech Language Pathology Notes (last 7 days) (Notes from 19 through 19)      Lela Bueno, SLP at 2019  3:12 PM          Inpatient Rehabilitation - Speech Language Pathology Discharge Summary  [facility]       Patient Name: Noman Mcdonald  : 1945  MRN: [id number]    Today's Date: 2019                   Admit Date: 2019      SLP Recommendation and Plan    Pt made slow, inconsistent progress during inpatient stay due to fluctuating attention and participation. 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 Suspect moderate-severe underlying cognitive impairment. With melodic intonation technique, she was able to produce basic phrases in unison with clinician. She was able to count 1-10 and perform phrase completion with MAX cues. She exhibited improved auditory comprehension for personal yes/no questions, requiring NO cues (x1); however, she was unable to answer simple yes/no questions despite MAX cues.  \"Yes\" bias noted when reading simple yes/no questions (x1). Pt was able to copy her name with NO cues (x1); additional graphic expression was characterized by single word perseverations. Recommend further speech therapy at next level of care. Pt requires 24 hour supervision due to impulsivity, impaired communication, confusion and decreased safety awareness.        Visit Dx:  No diagnosis found.      Time Calculation- SLP     Row Name 19 1259             Time Calculation- SLP    SLP Start Time  830  -OC      SLP Stop Time  00  -OC   " "   SLP Time Calculation (min)  30 min  -OC        User Key  (r) = Recorded By, (t) = Taken By, (c) = Cosigned By    Initials Name Provider Type    OC Lev, VERONICA Maria,[facility]-SLP Speech and Language Pathologist            SLP GOALS     Row Name 08/29/19 0900 08/28/19 1430 08/28/19 0830       Oral Nutrition/Hydration Goal 1 (SLP)    Barriers (Oral Nutrition/Hydration Goal 1, SLP)  --  --  Pt observed with trials of regular, mixed, puree and thins by cup (refused thins by straw). No overt s/s of aspiration or penetration or penetration observed with any consistency. Trace oral residue observed with mixed. Recommend change diet to regular with thins (no straws), 1:1 supervision for meals.  -AL    Progress/Outcomes (Oral Nutrition/Hydration Goal 1, SLP)  --  --  good progress toward goal  -AL       Words/Phrases/Sentences Goal 1 (SLP)    Improve Ability to Comprehend Words/Phrases/Sentences Through: Goal 1 (SLP)  identify objects, field of;other (comment) 12  -OC  --  --    Progress/Outcomes (Identify Objects and Pictures Goal 1, SLP)  continuing progress toward goal  -OC  good progress toward goal  -AL  --    Comment (Words/Phrases/Sentences Goal 1, SLP)  able to ID 4 trees, cups, and lamps- required extended time  -OC  Pt counted 1-10 with 50-70% accuracy in unison with clinician. She stated 5/5 functional phrases using melodic intonation with MAX Cues. She produced 4/5 one-word phrase completions (opposites) in unison with clinician and with written cues. She did not sing unison with clinician today; stated \"no.\"  -AL  --       Comprehend Questions Goal 1 (SLP)    Improve Ability to Comprehend Questions Goal 1 (SLP)  simple yes/no questions  -OC  --  --    Progress (Ability to Comprehend Questions Goal 1, SLP)  other (comment) yes bias, frustrated with SLP when attempted cueing  -OC  --  --       Word Retrieval Skills Goal 2 (SLP)    Progress/Outcomes (Word Retrieval Goal 2, SLP)  --  --  good progress toward goal  -AL "    Comment (Word Retrieval Goal 2, SLP)  --  --  Pt produced 2/3 functional phrases using melodic intonation with MAX cues; approximated the 3rd phrase.   -AL    Row Name 08/27/19 1230 08/27/19 1100          Oral Nutrition/Hydration Goal 1 (SLP)    Barriers (Oral Nutrition/Hydration Goal 1, SLP)  Pt exhibited no s/s of aspiration or penetration in 5/5 trials of water via cup. Plan to observe again with meal tomorrow prior to upgrading diet.  -AL  Pt observed with breakfast meal of mechanical soft/no mixed and thin liquid (water). She refused NTL this AM. No overt s/s of aspiration or penetration observed with any consistency. Minimal right labial residue observed, requiring cues to clear. No oral residue observed. Plan to observe again with meal of mechanical soft/ no mixed with thins prior to upgrading.  -AL     Progress/Outcomes (Oral Nutrition/Hydration Goal 1, SLP)  good progress toward goal  -AL  good progress toward goal  -AL        Comprehend Questions Goal 1 (SLP)    Progress/Outcomes (Comprehend Questions Goal 1, SLP)  good progress toward goal  -AL  --     Comment (Comprehend Questions Goal 1, SLP)  Personal yes/no questions: 100% with NO cues. Met x1.  -AL  --        Word Retrieval Skills Goal 1 (SLP)    Progress/Outcomes (Word Retrieval Goal 1, SLP)  good progress toward goal  -AL  --     Comment (Word Retrieval Goal 1, SLP)  Pt stated her first and last name and her 's name with MAX cues. She approximated her daughter's name with MAX cues. Using melodic intonation technique, pt produced 1/5 funtional phrases with MIN cues and 5/5 with MAX cues. Confrontation naming of basic objects: 20% with NO cues, 40% with MOD cues, 100% with MAX cues; apraxia impacted task. Pt counted 1-10 with 20% accuracy with MIN cues, 80% with MAX cues. She stated RAJAT with 100% accuracy with MAX cues.  -AL  Pt was unable to state functions of objects despite MAX cues; however, appropriate attempt observed x1.  She sang  Amazing Angie in unison with clinician with ~50% accuracy when cued to watch clinician's mouth.  -AL        Graphic Expression of Words Goal 1 (SLP)    Progress/Outcomes (Graphic Expression of Single Words Goal 1, SLP)  --  good progress toward goal  -AL     Comment (Graphic Expression of Single Words Goal 1, SLP)  --  Pt copied her full name with NO cues. She was unable to copy her 's name. Appeared to attempt to write her daughter's name, but could not be cued to accuracy.  -AL       User Key  (r) = Recorded By, (t) = Taken By, (c) = Cosigned By    Initials Name Provider Type    Candace Roman MA,[facility]-SLP Speech and Language Pathologist    Lela Holley SLP Speech and Language Pathologist            Therapy Charges for Today     Code Description Service Date Service Provider Modifiers Qty    10758973204  ST TREATMENT SWALLOW 2 8/28/2019 Lela Bueno SLP GN 1    [id number]  ST TREATMENT SPEECH 2 8/28/2019 Lela Bueno SLP GN 1                   WHIT Tavarez  8/29/2019    Electronically signed by Lela Bueno SLP at 8/29/2019  3:49 PM     Progress Notes signed by Lela Bueno SLP at 8/29/2019  3:47 PM         Inpatient Rehabilitation Functional Measures Assessment    Functional Measures  KATI Eating:  Branch  KATI Grooming: Branch  KATI Bathing:  Branch  KATI Upper Body Dressing:  Branch  KATI Lower Body Dressing:  Branch  KATI Toileting:  Branch    KATI Bladder Management  Level of Assistance:  Branch  Frequency/Number of Accidents this Shift:  Branch    KATI Bowel Management  Level of Assistance: Branch  Frequency/Number of Accidents this Shift: Branch    KATI Bed/Chair/Wheelchair Transfer:  Branch  KATI Toilet Transfer:  Branch  KATI Tub/Shower Transfer:  Branch    Previously Documented Mode of Locomotion at Discharge: Field  [facility] Expected Mode of Locomotion at Discharge: Branch  KATI Walk/Wheelchair:  Branch  KATI Stairs:  Branch    [facility] Comprehension:  Auditory comprehension is the usual mode. Patient does  not  comprehend complex/abstract information in their primary language without  assistance from a helper. Comprehension Score = 2, Maximal Prompting. Patient  comprehends basic daily needs 25-49% of the time. Patient understands simple  information via single words or gestures. Requires maximal/a lot of prompting  (most of the time). No assistive devices were required.  KATI Expression:  Vocal expression is the usual mode. Patient does not express  complex/abstract information in their primary language without a helper.  Expression Score = 2, Maximal Prompting. Patient expresses basic daily needs  25-49% of the time. Patient uses only single words or gestures and requires  maximal/a lot of prompting (most of the time). No assistive devices were  required.  KATI Social Interaction:  Branch  KATI Problem Solving:  Activity was not observed. Not assessed due to severity of  aphasia.  KATI Memory:  Activity was not observed. Not assessed due to severity of aphasia.      Therapy Mode Minutes  Occupational Therapy: Branch  Physical Therapy: Branch  Speech Language Pathology:  Individual: 0 minutes.    Signed by: Lela Bueno SLP      Electronically signed by Lela Bueno SLP at 8/29/2019  3:47 PM     Lela Bueno SLP at 8/29/2019  3:12 PM          Problem: Patient Care Overview  Goal: Plan of Care Review  Outcome: Unable to achieve outcome(s) by discharge Date Met: 08/29/19 08/29/19 1537   Patient Care Overview   IRF Plan of Care Review unable to achieve expected outcomes;discharged   Progress, Functional Goals progress more gradual than expected   Coping/Psychosocial   Plan of Care Reviewed With patient   OTHER   Outcome Summary Pt made slow, inconsistent progress during inpatient stay due to fluctuating attention and participation. 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 Suspect moderate-severe underlying cognitive impairment. With melodic intonation  "technique, she was able to produce basic phrases in unison with clinician. She was able to count 1-10 and perform phrase completion with MAX cues. She exhibited improved auditory comprehension for personal yes/no questions, requiring NO cues (x1); however, she was unable to answer simple yes/no questions despite MAX cues. \"Yes\" bias noted when reading simple yes/no questions (x1). Pt was able to copy her name with NO cues (x1); additional graphic expression was characterized by single word perseverations. Recommend further speech therapy at next level of care. Pt requires 24 hour supervision due to impulsivity, impaired communication, confusion and decreased safety awareness. Pt was tolerating regular with thins (no straws), with 1:1 supervision for meals due to impulsivity.           Electronically signed by Lela Bueno, SLP at 2019  3:41 PM     Lev, VERONICA Maria,CCC-SLP at 2019  9:00 AM          Inpatient Rehabilitation - Speech Language Pathology Treatment Note  [facility]     Patient Name: Darby Workman  : 1945  MRN: [id number]  Today's Date: 2019         Admit Date: 2019    Visit Dx:    No diagnosis found.  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)   • Right-sided headache   • Acute ischemic stroke (CMS/HCC)   • Embolic stroke (CMS/HCC)   • Cerebral infarction due to stenosis of left carotid artery (CMS/HCC)   • Anticoagulated by anticoagulation treatment   • Stroke (cerebrum) (CMS/HCC)        Therapy Treatment  Rehabilitation Treatment Summary     Row Name                Wound 19 1015 Left neck incision    Wound - Properties Group Date first assessed: 19 [LD] Time first assessed:  [LD] Side: Left [LD] Location: neck [LD] Type: incision [LD] Recorded by:  [LD] Martha Foster RN 19 " "1015      User Key  (r) = Recorded By, (t) = Taken By, (c) = Cosigned By    Initials Name Effective Dates Discipline    Martha Samuel, RN 06/16/16 -  Nurse          EDUCATION  The patient has been educated in the following areas:   Cognitive Impairment.    SLP Recommendation and Plan                         SLP GOALS     Row Name 08/29/19 0900 08/28/19 1430 08/28/19 0830       Oral Nutrition/Hydration Goal 1 (SLP)    Barriers (Oral Nutrition/Hydration Goal 1, SLP)  --  --  Pt observed with trials of regular, mixed, puree and thins by cup (refused thins by straw). No overt s/s of aspiration or penetration or penetration observed with any consistency. Trace oral residue observed with mixed. Recommend change diet to regular with thins (no straws), 1:1 supervision for meals.  -AL    Progress/Outcomes (Oral Nutrition/Hydration Goal 1, SLP)  --  --  good progress toward goal  -AL       Words/Phrases/Sentences Goal 1 (SLP)    Improve Ability to Comprehend Words/Phrases/Sentences Through: Goal 1 (SLP)  identify objects, field of;other (comment) 12  -OC  --  --    Progress/Outcomes (Identify Objects and Pictures Goal 1, SLP)  continuing progress toward goal  -OC  good progress toward goal  -AL  --    Comment (Words/Phrases/Sentences Goal 1, SLP)  able to ID 4 trees, cups, and lamps- required extended time  -OC  Pt counted 1-10 with 50-70% accuracy in unison with clinician. She stated 5/5 functional phrases using melodic intonation with MAX Cues. She produced 4/5 one-word phrase completions (opposites) in unison with clinician and with written cues. She did not sing unison with clinician today; stated \"no.\"  -AL  --       Comprehend Questions Goal 1 (SLP)    Improve Ability to Comprehend Questions Goal 1 (SLP)  simple yes/no questions  -OC  --  --    Progress (Ability to Comprehend Questions Goal 1, SLP)  other (comment) yes bias, frustrated with SLP when attempted cueing  -OC  --  --       Word Retrieval Skills Goal 2 " (SLP)    Progress/Outcomes (Word Retrieval Goal 2, SLP)  --  --  good progress toward goal  -AL    Comment (Word Retrieval Goal 2, SLP)  --  --  Pt produced 2/3 functional phrases using melodic intonation with MAX cues; approximated the 3rd phrase.   -AL    Row Name 08/27/19 1230 08/27/19 1100          Oral Nutrition/Hydration Goal 1 (SLP)    Barriers (Oral Nutrition/Hydration Goal 1, SLP)  Pt exhibited no s/s of aspiration or penetration in 5/5 trials of water via cup. Plan to observe again with meal tomorrow prior to upgrading diet.  -AL  Pt observed with breakfast meal of mechanical soft/no mixed and thin liquid (water). She refused NTL this AM. No overt s/s of aspiration or penetration observed with any consistency. Minimal right labial residue observed, requiring cues to clear. No oral residue observed. Plan to observe again with meal of mechanical soft/ no mixed with thins prior to upgrading.  -AL     Progress/Outcomes (Oral Nutrition/Hydration Goal 1, SLP)  good progress toward goal  -AL  good progress toward goal  -AL        Comprehend Questions Goal 1 (SLP)    Progress/Outcomes (Comprehend Questions Goal 1, SLP)  good progress toward goal  -AL  --     Comment (Comprehend Questions Goal 1, SLP)  Personal yes/no questions: 100% with NO cues. Met x1.  -AL  --        Word Retrieval Skills Goal 1 (SLP)    Progress/Outcomes (Word Retrieval Goal 1, SLP)  good progress toward goal  -AL  --     Comment (Word Retrieval Goal 1, SLP)  Pt stated her first and last name and her 's name with MAX cues. She approximated her daughter's name with MAX cues. Using melodic intonation technique, pt produced 1/5 funtional phrases with MIN cues and 5/5 with MAX cues. Confrontation naming of basic objects: 20% with NO cues, 40% with MOD cues, 100% with MAX cues; apraxia impacted task. Pt counted 1-10 with 20% accuracy with MIN cues, 80% with MAX cues. She stated RAJAT with 100% accuracy with MAX cues.  -AL  Pt was unable to  state functions of objects despite MAX cues; however, appropriate attempt observed x1.  She sang Amazing Angie in unison with clinician with ~50% accuracy when cued to watch clinician's mouth.  -AL        Graphic Expression of Words Goal 1 (SLP)    Progress/Outcomes (Graphic Expression of Single Words Goal 1, SLP)  --  good progress toward goal  -AL     Comment (Graphic Expression of Single Words Goal 1, SLP)  --  Pt copied her full name with NO cues. She was unable to copy her 's name. Appeared to attempt to write her daughter's name, but could not be cued to accuracy.  -AL       User Key  (r) = Recorded By, (t) = Taken By, (c) = Cosigned By    Initials Name Provider Type    Candace Roman MA,[facility]-SLP Speech and Language Pathologist    Lela Holley, SLP Speech and Language Pathologist              Time Calculation:     Time Calculation- SLP     Row Name 19 1259             Time Calculation- SLP    SLP Start Time  0830  -OC      SLP Stop Time  0900  -OC      SLP Time Calculation (min)  30 min  -OC        User Key  (r) = Recorded By, (t) = Taken By, (c) = Cosigned By    Initials Name Provider Type    Candace Roman MA,CCC-SLP Speech and Language Pathologist          Therapy Charges for Today     Code Description Service Date Service Provider Modifiers Qty    [id number]  ST TREATMENT SPEECH 2 2019 Candace Ohara MA,[facility]-SLP GN 1                     Candace Ohara MA, CCC-SLP  2019      Electronically signed by Candace Ohara MA, CCC-SLP at 2019  1:00 PM     Lela Bueno SLP at 2019  3:16 PM          Inpatient Rehabilitation - Speech Language Pathology   Swallow Treatment Note [facility]     Patient Name: Darby Workman  : 1945  MRN: [id number]  Today's Date: 2019               Admit Date: 2019    Visit Dx:    No diagnosis found.  Patient Active Problem List   Diagnosis   • Hypertensive crisis   • Diabetes mellitus (CMS/HCC)   • Hyperlipidemia   • Hypertension    • Elevated troponin   • Acute cerebrovascular accident (CVA) of cerebellum (CMS/HCC)   • Right-sided headache   • Acute ischemic stroke (CMS/HCC)   • Embolic stroke (CMS/HCC)   • Cerebral infarction due to stenosis of left carotid artery (CMS/HCC)   • Anticoagulated by anticoagulation treatment   • Stroke (cerebrum) (CMS/HCC)       Therapy Treatment  Rehabilitation Treatment Summary     Row Name                Wound 07/17/19 1015 Left neck incision    Wound - Properties Group Date first assessed: 07/17/19 [LD] Time first assessed: 1015 [LD] Side: Left [LD] Location: neck [LD] Type: incision [LD] Recorded by:  [LD] Martha Foster RN 07/17/19 1015      User Key  (r) = Recorded By, (t) = Taken By, (c) = Cosigned By    Initials Name Effective Dates Discipline    LD Martha Foster RN 06/16/16 -  Nurse          Outcome Summary         SLP GOALS     Row Name 08/28/19 1430 08/28/19 0830 08/27/19 1230       Oral Nutrition/Hydration Goal 1 (SLP)    Barriers (Oral Nutrition/Hydration Goal 1, SLP)  --  Pt observed with trials of regular, mixed, puree and thins by cup (refused thins by straw). No overt s/s of aspiration or penetration or penetration observed with any consistency. Trace oral residue observed with mixed. Recommend change diet to regular with thins (no straws), 1:1 supervision for meals.  -AL  Pt exhibited no s/s of aspiration or penetration in 5/5 trials of water via cup. Plan to observe again with meal tomorrow prior to upgrading diet.  -AL    Progress/Outcomes (Oral Nutrition/Hydration Goal 1, SLP)  --  good progress toward goal  -AL  good progress toward goal  -AL       Words/Phrases/Sentences Goal 1 (SLP)    Progress/Outcomes (Identify Objects and Pictures Goal 1, SLP)  good progress toward goal  -AL  --  --    Comment (Words/Phrases/Sentences Goal 1, SLP)  Pt counted 1-10 with 50-70% accuracy in unison with clinician. She stated 5/5 functional phrases using melodic intonation with MAX Cues. She produced  "4/5 one-word phrase completions (opposites) in unison with clinician and with written cues. She did not sing unison with clinician today; stated \"no.\"  -AL  --  --       Comprehend Questions Goal 1 (SLP)    Progress/Outcomes (Comprehend Questions Goal 1, SLP)  --  --  good progress toward goal  -AL    Comment (Comprehend Questions Goal 1, SLP)  --  --  Personal yes/no questions: 100% with NO cues. Met x1.  -AL       Word Retrieval Skills Goal 1 (SLP)    Progress/Outcomes (Word Retrieval Goal 1, SLP)  --  --  good progress toward goal  -AL    Comment (Word Retrieval Goal 1, SLP)  --  --  Pt stated her first and last name and her 's name with MAX cues. She approximated her daughter's name with MAX cues. Using melodic intonation technique, pt produced 1/5 funtional phrases with MIN cues and 5/5 with MAX cues. Confrontation naming of basic objects: 20% with NO cues, 40% with MOD cues, 100% with MAX cues; apraxia impacted task. Pt counted 1-10 with 20% accuracy with MIN cues, 80% with MAX cues. She stated RAJAT with 100% accuracy with MAX cues.  -AL       Word Retrieval Skills Goal 2 (SLP)    Progress/Outcomes (Word Retrieval Goal 2, SLP)  --  good progress toward goal  -AL  --    Comment (Word Retrieval Goal 2, SLP)  --  Pt produced 2/3 functional phrases using melodic intonation with MAX cues; approximated the 3rd phrase.   -AL  --    Row Name 08/27/19 1100 08/26/19 1100          Oral Nutrition/Hydration Goal 1 (SLP)    Barriers (Oral Nutrition/Hydration Goal 1, SLP)  Pt observed with breakfast meal of mechanical soft/no mixed and thin liquid (water). She refused NTL this AM. No overt s/s of aspiration or penetration observed with any consistency. Minimal right labial residue observed, requiring cues to clear. No oral residue observed. Plan to observe again with meal of mechanical soft/ no mixed with thins prior to upgrading.  -AL  --     Progress/Outcomes (Oral Nutrition/Hydration Goal 1, SLP)  good progress " "toward goal  -AL  --        Word Retrieval Skills Goal 1 (SLP)    Comment (Word Retrieval Goal 1, SLP)  Pt was unable to state functions of objects despite MAX cues; however, appropriate attempt observed x1.  She sang Amazing Angie in unison with clinician with ~50% accuracy when cued to watch clinician's mouth.  -AL  Pt produced ~50% of \"Our Father\" in unison with clinician (pt brought rosatrell to therapy). She was able to sing \"Amazing Angie\" in unison with clinician with 75% accuracy with MAX cues; benefits from cues to look at SLP face while singing. She produced 3 functional phrases and her name with melodic intonation technique and MAX cues. In supported communication, she communicated her number of kids with MOD-MAX cues (benefited from written choices).  -AL        Graphic Expression of Words Goal 1 (SLP)    Progress/Outcomes (Graphic Expression of Single Words Goal 1, SLP)  good progress toward goal  -AL  --     Comment (Graphic Expression of Single Words Goal 1, SLP)  Pt copied her full name with NO cues. She was unable to copy her 's name. Appeared to attempt to write her daughter's name, but could not be cued to accuracy.  -AL  --       User Key  (r) = Recorded By, (t) = Taken By, (c) = Cosigned By    Initials Name Provider Type    Lela Holley SLP Speech and Language Pathologist          EDUCATION  The patient has been educated in the following areas:   Dysphagia (Swallowing Impairment).    SLP Recommendation and Plan                                Time Calculation:   Time Calculation- SLP     Row Name 08/28/19 1514 08/28/19 1248          Time Calculation- SLP    SLP Start Time  1430  -AL  0830  -AL     SLP Stop Time  1500  -AL  0900  -AL     SLP Time Calculation (min)  30 min  -AL  30 min  -AL       User Key  (r) = Recorded By, (t) = Taken By, (c) = Cosigned By    Initials Name Provider Type    Lela Holley SLP Speech and Language Pathologist          Therapy Charges for Today     Code " Description Service Date Service Provider Modifiers Qty    69346215639 HC ST TREATMENT SWALLOW 1 2019 Lela Bueno SLP GN 1    [id number] HC ST TREATMENT SPEECH 1 2019 Lela Bueno SLP GN 1    [id number] HC ST TREATMENT SPEECH 1 2019 Lela Bueno SLP GN 2    [id number] HC ST TREATMENT SWALLOW 2 2019 Lela Bueno SLP GN 1    [id number] HC ST TREATMENT SPEECH 2 2019 Lela Bueno SLP GN 1                 WHIT Tavarez  2019    Electronically signed by Lela Bueno SLP at 2019  3:16 PM     Lela Bueno SLP at 2019  3:15 PM          Inpatient Rehabilitation - Speech Language Pathology Treatment Note    [facility]       Patient Name: Darby Workman  : 1945  MRN: [id number]    Today's Date: 2019           Admit Date: 2019      Visit Dx:      No diagnosis found.    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)   • Right-sided headache   • Acute ischemic stroke (CMS/HCC)   • Embolic stroke (CMS/HCC)   • Cerebral infarction due to stenosis of left carotid artery (CMS/HCC)   • Anticoagulated by anticoagulation treatment   • Stroke (cerebrum) (CMS/HCC)          Therapy Treatment    Evaluation/Coping    Evaluation/Treatment Time and Intent  Subjective Information: no complaints (19 0830 : Lela Bueno, SLP)  Existing Precautions/Restrictions: fall (19 1430 : Lela Bueno, SLP)  Document Type: therapy note (daily note) (19 1430 : Lela Bueno, SLP)  Mode of Treatment: speech-language pathology (19 1430 : Lela Bueno, SLP)  Patient/Family Observations: Pt appeared agitated upon arrival in room, holding CNA's hand; however, when treatment initiated in room, pt worked well with clinician. Appears very frustrated by her situation. (19 1430 : Lela Bueno, SLP)  Start  Time (Evaluation/Treatment): 1430 (08/28/19 1430 : Lela Bueno SLP)  Stop Time (Evaluation/Treatment): 1500 (08/28/19 1430 : Lela Bueno SLP)    Vitals/Pain/Safety    Pain Scale: Numbers Pre/Post-Treatment  Pain Scale: Numbers, Pretreatment: 0/10 - no pain (08/28/19 0830 : Lela Bueno SLP)  Pain Scale: Numbers, Post-Treatment: 0/10 - no pain (08/28/19 0830 : Lela Bueno SLP)  Pain Scale: FACES Pre/Post-Treatment  Pain: FACES Scale, Pretreatment: 0-->no hurt (08/28/19 1430 : Lela Bueno SLP)  Pain: FACES Scale, Post-Treatment: 0-->no hurt (08/28/19 1430 : Lela Bueno SLP)    Cognition/Communication       EDUCATION    The patient has been educated in the following areas:     Communication Impairment.    SLP Recommendation and Plan                                                   Plan of Care Reviewed With: patient      SLP GOALS     Row Name 08/28/19 1430 08/28/19 0830 08/27/19 1230       Oral Nutrition/Hydration Goal 1 (SLP)    Barriers (Oral Nutrition/Hydration Goal 1, SLP)  --  Pt observed with trials of regular, mixed, puree and thins by cup (refused thins by straw). No overt s/s of aspiration or penetration or penetration observed with any consistency. Trace oral residue observed with mixed. Recommend change diet to regular with thins (no straws), 1:1 supervision for meals.  -AL  Pt exhibited no s/s of aspiration or penetration in 5/5 trials of water via cup. Plan to observe again with meal tomorrow prior to upgrading diet.  -AL    Progress/Outcomes (Oral Nutrition/Hydration Goal 1, SLP)  --  good progress toward goal  -AL  good progress toward goal  -AL       Words/Phrases/Sentences Goal 1 (SLP)    Progress/Outcomes (Identify Objects and Pictures Goal 1, SLP)  good progress toward goal  -AL  --  --    Comment (Words/Phrases/Sentences Goal 1, SLP)  Pt counted 1-10 with 50-70% accuracy in unison with clinician. She stated 5/5 functional phrases using melodic intonation with MAX Cues. She produced 4/5  "one-word phrase completions (opposites) in unison with clinician and with written cues. She did not sing unison with clinician today; stated \"no.\"  -AL  --  --       Comprehend Questions Goal 1 (SLP)    Progress/Outcomes (Comprehend Questions Goal 1, SLP)  --  --  good progress toward goal  -AL    Comment (Comprehend Questions Goal 1, SLP)  --  --  Personal yes/no questions: 100% with NO cues. Met x1.  -AL       Word Retrieval Skills Goal 1 (SLP)    Progress/Outcomes (Word Retrieval Goal 1, SLP)  --  --  good progress toward goal  -AL    Comment (Word Retrieval Goal 1, SLP)  --  --  Pt stated her first and last name and her 's name with MAX cues. She approximated her daughter's name with MAX cues. Using melodic intonation technique, pt produced 1/5 funtional phrases with MIN cues and 5/5 with MAX cues. Confrontation naming of basic objects: 20% with NO cues, 40% with MOD cues, 100% with MAX cues; apraxia impacted task. Pt counted 1-10 with 20% accuracy with MIN cues, 80% with MAX cues. She stated RAJAT with 100% accuracy with MAX cues.  -AL       Word Retrieval Skills Goal 2 (SLP)    Progress/Outcomes (Word Retrieval Goal 2, SLP)  --  good progress toward goal  -AL  --    Comment (Word Retrieval Goal 2, SLP)  --  Pt produced 2/3 functional phrases using melodic intonation with MAX cues; approximated the 3rd phrase.   -AL  --    Row Name 08/27/19 1100 08/26/19 1100          Oral Nutrition/Hydration Goal 1 (SLP)    Barriers (Oral Nutrition/Hydration Goal 1, SLP)  Pt observed with breakfast meal of mechanical soft/no mixed and thin liquid (water). She refused NTL this AM. No overt s/s of aspiration or penetration observed with any consistency. Minimal right labial residue observed, requiring cues to clear. No oral residue observed. Plan to observe again with meal of mechanical soft/ no mixed with thins prior to upgrading.  -AL  --     Progress/Outcomes (Oral Nutrition/Hydration Goal 1, SLP)  good progress toward " "goal  -AL  --        Word Retrieval Skills Goal 1 (SLP)    Comment (Word Retrieval Goal 1, SLP)  Pt was unable to state functions of objects despite MAX cues; however, appropriate attempt observed x1.  She sang Amazing Angie in unison with clinician with ~50% accuracy when cued to watch clinician's mouth.  -AL  Pt produced ~50% of \"Our Father\" in unison with clinician (pt brought rosatrell to therapy). She was able to sing \"Amazing Angie\" in unison with clinician with 75% accuracy with MAX cues; benefits from cues to look at SLP face while singing. She produced 3 functional phrases and her name with melodic intonation technique and MAX cues. In supported communication, she communicated her number of kids with MOD-MAX cues (benefited from written choices).  -AL        Graphic Expression of Words Goal 1 (SLP)    Progress/Outcomes (Graphic Expression of Single Words Goal 1, SLP)  good progress toward goal  -AL  --     Comment (Graphic Expression of Single Words Goal 1, SLP)  Pt copied her full name with NO cues. She was unable to copy her 's name. Appeared to attempt to write her daughter's name, but could not be cued to accuracy.  -AL  --       User Key  (r) = Recorded By, (t) = Taken By, (c) = Cosigned By    Initials Name Provider Type    Lela Holley SLP Speech and Language Pathologist                  Time Calculation:       Time Calculation- SLP     Row Name 08/28/19 1514 08/28/19 1248          Time Calculation- SLP    SLP Start Time  1430  -AL  0830  -AL     SLP Stop Time  1500  -AL  0900  -AL     SLP Time Calculation (min)  30 min  -AL  30 min  -AL       User Key  (r) = Recorded By, (t) = Taken By, (c) = Cosigned By    Initials Name Provider Type    Lela Holley SLP Speech and Language Pathologist            Therapy Charges for Today     Code Description Service Date Service Provider Modifiers Qty    31393920831  ST TREATMENT SWALLOW 1 8/27/2019 Lela Bueno SLP GN 1    [id number]  ST TREATMENT " SPEECH 1 8/27/2019 Lela Bueno SLP GN 1    [id number] HC ST TREATMENT SPEECH 1 8/27/2019 Lela Bueno SLP GN 2    [id number] HC ST TREATMENT SWALLOW 2 8/28/2019 Lela Bueno SLP GN 1    [id number] HC ST TREATMENT SPEECH 2 8/28/2019 Lela Bueno SLP GN 1                           WHIT Tavarez  8/28/2019        Electronically signed by Lela Bueno SLP at 8/28/2019  3:16 PM     Progress Notes signed by Lela Bueno SLP at 8/28/2019  3:54 PM         Inpatient Rehabilitation Functional Measures Assessment    Functional Measures  KATI Eating:  Eating Score = 5. Patient is supervision/set-up for eating,  requiring: No assistive devices were required.  [facility] Grooming: [facility] Bathing:  [facility] Upper Body Dressing:  [facility] Lower Body Dressing:  [facility] Toileting:  [facility] Bladder Management  Level of Assistance:  [place]  Frequency/Number of Accidents this Shift:  [facility] Bowel Management  Level of Assistance: [place]  Frequency/Number of Accidents this Shift: [facility] Bed/Chair/Wheelchair Transfer:  [facility] Toilet Transfer:  [facility] Tub/Shower Transfer:  [place]    Previously Documented Mode of Locomotion at Discharge: Field  KATI Expected Mode of Locomotion at Discharge: [facility] Walk/Wheelchair:  [facility] Stairs:  [facility] Comprehension:  Auditory comprehension is the usual mode. Patient does not  comprehend complex/abstract information in their primary language without  assistance from a helper. Comprehension Score = 2, Maximal Prompting. Patient  comprehends basic daily needs 25-49% of the time. Patient understands simple  information via single words or gestures. Requires maximal/a lot of prompting  (most of the time). No assistive devices were required.  KATI Expression:  Vocal expression is the usual mode. Patient does not express  complex/abstract information in their primary language without a helper.  Expression Score = 2, Maximal Prompting. Patient expresses  basic daily needs  25-49% of the time. Patient uses only single words or gestures and requires  maximal/a lot of prompting (most of the time). No assistive devices were  required.  KATI Social Interaction:  Social Interaction Score = 2, Maximal Direction.  Patient interacts appropriately 25-49% of the time. Patient requires maximal/a  lot of direction (most of the time) for the following behavior(s):  KATI Problem Solving:  Activity was not observed. Unable to assess due to  severity of language impairment.  KATI Memory:  Activity was not observed. Unable to assess due to severity of  aphasia.    Therapy Mode Minutes  Occupational Therapy: Branch  Physical Therapy: [facility]  Speech Language Pathology:  Individual: 60 minutes.    Signed by: WHIT Triplett      Electronically signed by Lela Bueno SLP at 2019  3:54 PM     Lela Bueno SLP at 2019 12:50 PM          Problem: Patient Care Overview  Goal: Plan of Care Review   19 1249   Patient Care Overview   IRF Plan of Care Review progress ongoing, continue   Progress, Functional Goals demonstrating adequate progress   Coping/Psychosocial   Plan of Care Reviewed With patient   OTHER   Outcome Summary Reassessed swallow with trials of regular, mixed, puree and thins via cup. Pt appears safe for regular diet with thin liquids (no straws). She continues to need 1:1 supervision for meals due to mild impulsivity with bite size and rate of feeding.           Electronically signed by Lela Bueno SLP at 2019 12:50 PM     Lela Bueno SLP at 2019  3:15 PM          Inpatient Rehabilitation - Speech Language Pathology Treatment Note    [facility]       Patient Name: Darby Workman  : 1945  MRN: [id number]    Today's Date: 2019           Admit Date: 2019      Visit Dx:      No diagnosis found.    Patient Active Problem List   Diagnosis   • Hypertensive crisis   • Diabetes mellitus (CMS/HCC)   • Hyperlipidemia   • Hypertension    • Elevated troponin   • Acute cerebrovascular accident (CVA) of cerebellum (CMS/HCC)   • Right-sided headache   • Acute ischemic stroke (CMS/HCC)   • Embolic stroke (CMS/HCC)   • Cerebral infarction due to stenosis of left carotid artery (CMS/HCC)   • Anticoagulated by anticoagulation treatment   • Stroke (cerebrum) (CMS/HCC)          Therapy Treatment    Evaluation/Coping    Evaluation/Treatment Time and Intent  Subjective Information: no complaints (08/27/19 1230 : Lela Bueno, SLP)  Existing Precautions/Restrictions: fall (08/27/19 1230 : Lela Bueno, SLP)  Document Type: therapy note (daily note) (08/27/19 1230 : Lela Bueno, SLP)  Mode of Treatment: speech-language pathology (08/27/19 1230 : Lela Bueno, SLP)  Patient/Family Observations: Pt seen bedside. She participated well in session. (08/27/19 1230 : Lela Bueno, SLP)  Start Time (Evaluation/Treatment): 1230 (08/27/19 1230 : Lela Bueno, SLP)  Stop Time (Evaluation/Treatment): 1300 (08/27/19 1230 : Lela Bueno, SLP)    Vitals/Pain/Safety    Pain Scale: Numbers Pre/Post-Treatment  Pain Scale: Numbers, Pretreatment: 0/10 - no pain (08/27/19 1230 : Lela Bueno, SLP)           EDUCATION    The patient has been educated in the following areas:     Communication Impairment.    SLP Recommendation and Plan  Continue communication and swallow therapy.                                                      SLP GOALS     Row Name 08/27/19 1230 08/27/19 1100 08/26/19 1100       Oral Nutrition/Hydration Goal 1 (SLP)    Barriers (Oral Nutrition/Hydration Goal 1, SLP)  Pt exhibited no s/s of aspiration or penetration in 5/5 trials of water via cup. Plan to observe again with meal tomorrow prior to upgrading diet.  -AL  Pt observed with breakfast meal of mechanical soft/no mixed and thin liquid (water). She refused NTL this AM. No overt s/s of aspiration or penetration observed with any consistency. Minimal right labial residue observed, requiring cues to clear. No oral  "residue observed. Plan to observe again with meal of mechanical soft/ no mixed with thins prior to upgrading.  -AL  --    Progress/Outcomes (Oral Nutrition/Hydration Goal 1, SLP)  good progress toward goal  -AL  good progress toward goal  -AL  --       Comprehend Questions Goal 1 (SLP)    Progress/Outcomes (Comprehend Questions Goal 1, SLP)  good progress toward goal  -AL  --  --    Comment (Comprehend Questions Goal 1, SLP)  Personal yes/no questions: 100% with NO cues. Met x1.  -AL  --  --       Word Retrieval Skills Goal 1 (SLP)    Progress/Outcomes (Word Retrieval Goal 1, SLP)  good progress toward goal  -AL  --  --    Comment (Word Retrieval Goal 1, SLP)  Pt stated her first and last name and her 's name with MAX cues. She approximated her daughter's name with MAX cues. Using melodic intonation technique, pt produced 1/5 funtional phrases with MIN cues and 5/5 with MAX cues. Confrontation naming of basic objects: 20% with NO cues, 40% with MOD cues, 100% with MAX cues; apraxia impacted task. Pt counted 1-10 with 20% accuracy with MIN cues, 80% with MAX cues. She stated RAJAT with 100% accuracy with MAX cues.  -AL  Pt was unable to state functions of objects despite MAX cues; however, appropriate attempt observed x1.  She sang Amazing Angie in unison with clinician with ~50% accuracy when cued to watch clinician's mouth.  -AL  Pt produced ~50% of \"Our Father\" in unison with clinician (pt brought rosatrell to therapy). She was able to sing \"Amazing Angie\" in unison with clinician with 75% accuracy with MAX cues; benefits from cues to look at SLP face while singing. She produced 3 functional phrases and her name with melodic intonation technique and MAX cues. In supported communication, she communicated her number of kids with MOD-MAX cues (benefited from written choices).  -AL       Graphic Expression of Words Goal 1 (SLP)    Progress/Outcomes (Graphic Expression of Single Words Goal 1, SLP)  --  good progress " toward goal  -AL  --    Comment (Graphic Expression of Single Words Goal 1, SLP)  --  Pt copied her full name with NO cues. She was unable to copy her 's name. Appeared to attempt to write her daughter's name, but could not be cued to accuracy.  -AL  --      User Key  (r) = Recorded By, (t) = Taken By, (c) = Cosigned By    Initials Name Provider Type    Lela Holley SLP Speech and Language Pathologist                  Time Calculation:       Time Calculation- SLP     Row Name 08/27/19 1514 08/27/19 1202          Time Calculation- SLP    SLP Start Time  1230  -AL  0845  -AL     SLP Stop Time  1300  -AL  0915  -AL     SLP Time Calculation (min)  30 min  -AL  30 min  -AL       User Key  (r) = Recorded By, (t) = Taken By, (c) = Cosigned By    Initials Name Provider Type    Lela Holley SLP Speech and Language Pathologist            Therapy Charges for Today     Code Description Service Date Service Provider Modifiers Qty    71133324494 HC ST TREATMENT SPEECH 2 8/26/2019 Lela Bueno SLP GN 1    [id number] HC ST TREATMENT SWALLOW 1 8/27/2019 Lela Bueno SLP GN 1    [id number] HC ST TREATMENT SPEECH 1 8/27/2019 Lela Bueno SLP GN 1    [id number] HC ST TREATMENT SPEECH 1 8/27/2019 Lela Bueno SLP GN 2                           WHIT Tavarez  8/27/2019        Electronically signed by Lela Bueno SLP at 8/27/2019  3:16 PM     Progress Notes signed by Lela Bueno SLP at 8/27/2019  3:22 PM         Inpatient Rehabilitation Functional Measures Assessment    Functional Measures  KATI Eating:  Eating Score = 5. Patient is supervision/set-up for eating,  requiring: Verbal cuing, prompting, or instructing. No assistive devices were  required.  KATI Grooming: [place]  KATI Bathing:  [place]  KATI Upper Body Dressing:  [place]  KATI Lower Body Dressing:  [place]  KATI Toileting:  [place]    KATI Bladder Management  Level of Assistance:  [place]  Frequency/Number of Accidents this Shift:  [place]    KATI Bowel Management  Level  of Assistance: [place]  Frequency/Number of Accidents this Shift: [facility] Bed/Chair/Wheelchair Transfer:  [facility] Toilet Transfer:  [facility] Tub/Shower Transfer:  [place]    Previously Documented Mode of Locomotion at Discharge: Field  KATI Expected Mode of Locomotion at Discharge: [facility] Walk/Wheelchair:  [facility] Stairs:  [facility] Comprehension:  Auditory comprehension is the usual mode. Patient does not  comprehend complex/abstract information in their primary language without  assistance from a helper. Comprehension Score = 3, Moderate Prompting. Patient  comprehends basic daily needs or ideas 50-74% of the time. Patient requires  moderate/some prompting. No assistive devices were required.  KATI Expression:  Vocal expression is the usual mode. Patient does not express  complex/abstract information in their primary language without a helper.  Expression Score = 2, Maximal Prompting. Patient expresses basic daily needs  25-49% of the time. Patient uses only single words or gestures and requires  maximal/a lot of prompting (most of the time). No assistive devices were  required.  KATI Social Interaction:  Social Interaction Score = 3, Moderate Direction.  Patient interacts appropriately 50-74% of the time.  Patient requires  moderate/some direction for the following behavior(s):  KATI Problem Solving:  Activity was not observed. Unable to assess due to  severity of aphasia.  KATI Memory:  Activity was not observed. Unable to assess due to severity of  aphasia.    Therapy Mode Minutes  Occupational Therapy: [place]  Physical Therapy: [place]  Speech Language Pathology:  Individual: 60 minutes.    Signed by: Lela Bueno, SLP      Electronically signed by Lela Bueno SLP at 2019  3:22 PM     Lela Bueno SLP at 2019  3:53 PM          Inpatient Rehabilitation - Speech Language Pathology Treatment Note    [facility]       Patient Name: Darby Workman  : 1945  MRN:  "[id number]    Today's Date: 8/26/2019           Admit Date: 7/31/2019      Visit Dx:      No diagnosis found.    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)   • Right-sided headache   • Acute ischemic stroke (CMS/HCC)   • Embolic stroke (CMS/HCC)   • Cerebral infarction due to stenosis of left carotid artery (CMS/HCC)   • Anticoagulated by anticoagulation treatment   • Stroke (cerebrum) (CMS/HCC)          Therapy Treatment    Evaluation/Coping    Evaluation/Treatment Time and Intent  Subjective Information: no complaints (08/26/19 0830 : Lela Bueno, SLP)  Existing Precautions/Restrictions: fall (08/26/19 0830 : Lela Bueno, SLP)  Document Type: therapy note (daily note) (08/26/19 0830 : Lela Bueno, SLP)  Mode of Treatment: speech-language pathology (08/26/19 1400 : Lela Bueno, SLP)  Patient/Family Observations: In PM, attempted therapy session at 12:30 and 2:30. In both instances, pt perseverated on stating \"no,\" closing eyes, holding family picture and waving therapist away. Spoke with nursing regarding possible status change. (08/26/19 1400 : Lela Bueno, SLP)  Start Time (Evaluation/Treatment): 0830 (08/26/19 0830 : Lela Bueno, SLP)  Stop Time (Evaluation/Treatment): 0900 (08/26/19 0830 : Lela Bueno, SLP)  Unable to Perform (Evaluation/Treatment): patient refused (08/26/19 1400 : Lela Bueno, SLP)    Vitals/Pain/Safety    Pain Scale: Numbers Pre/Post-Treatment  Pain Scale: Numbers, Pretreatment: 0/10 - no pain (08/26/19 0830 : Lela Bueno, SLP)    Cognition/Communication         Oral Motor/Eating         Mobility/Basic Activities/Instrumental Activities/Motor/Modality                   ROM/MMT                   Sensory/Myotome/Dermatome/Edema               Posture/Balance/Special Tests/Exercise/Transportation/Sexual Function          " "         Orthotics/Residual Limb/Prosthetic Management              Outcome Summary         EDUCATION    The patient has been educated in the following areas:     Communication Impairment.    SLP Recommendation and Plan                                                          SLP GOALS     Row Name 08/26/19 1100 08/24/19 1300          Words/Phrases/Sentences Goal 1 (SLP)    Improve Ability to Comprehend Words/Phrases/Sentences Through: Goal 1 (SLP)  --  identify objects, field of  -NR     Progress (Ability to Contruct Words/Phrases/Sentences Goal 1, SLP)  --  0%  -NR     Progress/Outcomes (Identify Objects and Pictures Goal 1, SLP)  --  continuing progress toward goal  -NR     Comment (Words/Phrases/Sentences Goal 1, SLP)  --  Total assist needed to Id objects in a field of two, pt agitated  -NR        Word Retrieval Skills Goal 1 (SLP)    Comment (Word Retrieval Goal 1, SLP)  Pt produced ~50% of \"Our Father\" in unison with clinician (pt brought [place] to therapy). She was able to sing \"Amazing Angie\" in unison with clinician with 75% accuracy with MAX cues; benefits from cues to look at SLP face while singing. She produced 3 functional phrases and her name with melodic intonation technique and MAX cues. In supported communication, she communicated her number of kids with MOD-MAX cues (benefited from written choices).  -AL  --        Graphic Expression of Shapes, Letters, Numbers Goal 1 (SLP)    Improve Graphic Expression of Shapes, Letters, and Numbers Goal 1 (SLP)  --  copy shapes, numbers, and letters  -NR     Progress/Outcomes (Graphic Expression of Shapes, Letters, and Numbers Goal 1, SLP)  --  continuing progress toward goal  -NR     Comment (Graphic Expression of Shapes, Letters, and Numbers Goal 1, SLP)  --  Pt able to copy single printed words c extra time  and reduced legibility.  Pt able to select a crayon from a field of two intermittently and color portions of a pictured scene.  Pt was very agitated for " both sessions.  -NR       User Key  (r) = Recorded By, (t) = Taken By, (c) = Cosigned By    Initials Name Provider Type    Mariposa Dalton MA,CCC-SLP Speech and Language Pathologist    Lela Holley SLP Speech and Language Pathologist             SLP Outcome Measures (last 72 hours)      SLP Outcome Measures     Row Name 08/24/19 1300             Adult FCM Scores    Verbal Expression FCM Score  2  -NR        User Key  (r) = Recorded By, (t) = Taken By, (c) = Cosigned By    Initials Name Effective Dates    Mariposa Dalton MA, CCC-SLP 06/08/18 -               Time Calculation:       Time Calculation- SLP     Row Name 08/26/19 1150             Time Calculation- SLP    SLP Start Time  0830  -AL      SLP Stop Time  0900  -AL      SLP Time Calculation (min)  30 min  -AL        User Key  (r) = Recorded By, (t) = Taken By, (c) = Cosigned By    Initials Name Provider Type    Lela Holley SLP Speech and Language Pathologist            Therapy Charges for Today     Code Description Service Date Service Provider Modifiers Qty    [id number]  ST TREATMENT SPEECH 2 8/26/2019 Lela Bueno SLP GN 1               ADULT NOMS (last 72 hours)      Adult NOMS     Row Name 08/24/19 1300                   Adult FCM Scores    Verbal Expression FCM Score  2  -NR          User Key  (r) = Recorded By, (t) = Taken By, (c) = Cosigned By    Initials Name Effective Dates    Mariposa Dalton MA, CCC-SLP 06/08/18 -                      WHIT Tavarez  8/26/2019        Electronically signed by Lela Bueno SLP at 8/26/2019  3:54 PM     Progress Notes signed by Lela Bueno SLP at 8/26/2019  4:21 PM         Inpatient Rehabilitation Functional Measures Assessment and Plan of Care    Plan of Care  Updated Problems/Interventions  Communication    [ST] Comprehension(Active)  Current Status(08/26/2019): mod-severely impaired; improved ability to point to  named objects and body parts, still cannot follow verbal commands consistently  or answer  yes/no questions reliably  Weekly Goal(08/26/2019): follow 1-step commands with a model  Discharge Goal: improved comprehension    [ST] Expression(Active)  Current Status(08/19/2019): Severely impaired. Stimulable for use of melodic  intonation to produce phrases on 8/26/19.  Weekly Goal(08/30/2019): Produce functional phrases for ADL tasks  Discharge Goal: functional expression for basic wants/needs        Swallow Function    [ST] Swallowing(Active)  Current Status(08/26/2019): VFSS 8/15: [facility] [place]. soft, no mixed, ground meat, NTL;  medications whole as tolerated with puree/NTL; water/ice between meals after  oral care  Weekly Goal(08/26/2019): tolerate current diet without s/s pen/asp  Discharge Goal: tolerate least restrictive diet without s/s pen/asp    Functional Measures  KATI Eating:  [facility] Grooming: [facility] Bathing:  [facility] Upper Body Dressing:  [facility] Lower Body Dressing:  [facility] Toileting:  [facility] Bladder Management  Level of Assistance:  [place]  Frequency/Number of Accidents this Shift:  [facility] Bowel Management  Level of Assistance: [place]  Frequency/Number of Accidents this Shift: [facility] Bed/Chair/Wheelchair Transfer:  [facility] Toilet Transfer:  [facility] Tub/Shower Transfer:  [place]    Previously Documented Mode of Locomotion at Discharge: Field  KATI Expected Mode of Locomotion at Discharge: [facility] Walk/Wheelchair:  [facility] Stairs:  [facility] Comprehension:  [facility] Expression:  [facility] Social Interaction:  [facility] Problem Solving:  [facility] Memory:  [place]    Therapy Mode Minutes  Occupational Therapy: [place]  Physical Therapy: [place]  Speech Language Pathology:  [place]    Signed by: Lela Bueno, SLP      Electronically signed by Lela Bueno, WHIT at 8/26/2019  4:21 PM     Progress Notes signed by Lela Bueno SLP at 8/26/2019  3:48 PM         Inpatient Rehabilitation Functional Measures Assessment    Functional Measures  [facility]  Eating:  [place]  [facility] Grooming: [facility] Bathing:  [facility] Upper Body Dressing:  [facility] Lower Body Dressing:  [facility] Toileting:  [facility] Bladder Management  Level of Assistance:  [place]  Frequency/Number of Accidents this Shift:  [facility] Bowel Management  Level of Assistance: [place]  Frequency/Number of Accidents this Shift: [facility] Bed/Chair/Wheelchair Transfer:  [facility] Toilet Transfer:  [facility] Tub/Shower Transfer:  [place]    Previously Documented Mode of Locomotion at Discharge: Field  KATI Expected Mode of Locomotion at Discharge: [facility] Walk/Wheelchair:  [facility] Stairs:  [facility] Comprehension:  Auditory comprehension is the usual mode. Patient does not  comprehend complex/abstract information in their primary language without  assistance from a helper. Comprehension Score = 3, Moderate Prompting. Patient  comprehends basic daily needs or ideas 50-74% of the time. Patient requires  moderate/some prompting. No assistive devices were required.  KATI Expression:  Vocal expression is the usual mode. Patient does not express  complex/abstract information in their primary language without a helper.  Expression Score = 2, Maximal Prompting. Patient expresses basic daily needs  25-49% of the time. Patient uses only single words or gestures and requires  maximal/a lot of prompting (most of the time). No assistive devices were  required.  KATI Social Interaction:  Social Interaction Score = 2, Maximal Direction.  Patient interacts appropriately 25-49% of the time. Patient requires maximal/a  lot of direction (most of the time) for the following behavior(s):  Non-cooperative.  KATI Problem Solving:  Activity was not observed. Unable to assess due to  severity of aphasia.  KATI Memory:  Activity was not observed. Unable to assess due to severity of  aphasia.    Therapy Mode Minutes  Occupational Therapy: [place]  Physical Therapy: [place]  Speech Language Pathology:   Individual: 30 minutes.    Signed by: Lela Bueno, SLP      Electronically signed by Lela Bueno SLP at 2019  3:48 PM     Mariposa Zuluaga MA,CCC-SLP at 2019  3:36 PM          Inpatient Rehabilitation - Speech Language Pathology Treatment Note    [facility]       Patient Name: Darby Workman  : 1945  MRN: [id number]    Today's Date: 2019           Admit Date: 2019      Visit Dx:      No diagnosis found.    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)   • Right-sided headache   • Acute ischemic stroke (CMS/HCC)   • Embolic stroke (CMS/HCC)   • Cerebral infarction due to stenosis of left carotid artery (CMS/HCC)   • Anticoagulated by anticoagulation treatment   • Stroke (cerebrum) (CMS/HCC)          Therapy Treatment    Evaluation/Coping    Evaluation/Treatment Time and Intent  Document Type: therapy note (daily note) (19 1300 : Mariposa Zuluaga MA,CCC-SLP)    Vitals/Pain/Safety    Pain Scale: FACES Pre/Post-Treatment  Pain: FACES Scale, Pretreatment: 0-->no hurt (19 1300 : Mariposa Zuluaga MA,CCC-SLP)  Pain: FACES Scale, Post-Treatment: 0-->no hurt (19 1300 : Mariposa Zuluaga MA,CCC-SLP)    Cognition/Communication         Oral Motor/Eating         Mobility/Basic Activities/Instrumental Activities/Motor/Modality                   ROM/MMT                   Sensory/Myotome/Dermatome/Edema               Posture/Balance/Special Tests/Exercise/Transportation/Sexual Function                   Orthotics/Residual Limb/Prosthetic Management              Outcome Summary         EDUCATION    The patient has been educated in the following areas:     Communication Impairment.    SLP Recommendation and Plan                                                          SLP GOALS     Row Name 19 1300 19 1115  08/23/19 0830       Words/Phrases/Sentences Goal 1 (SLP)    Improve Ability to Comprehend Words/Phrases/Sentences Through: Goal 1 (SLP)  identify objects, field of  -NR  --  --    Progress (Ability to Contruct Words/Phrases/Sentences Goal 1, SLP)  0%  -NR  with minimal cues (75-90%)  -KB  with moderate cues (50-74%);with maximum cues (25-49%)  -KB    Progress/Outcomes (Identify Objects and Pictures Goal 1, SLP)  continuing progress toward goal  -NR  good progress toward goal;goal ongoing  -KB  goal ongoing  -KB    Comment (Words/Phrases/Sentences Goal 1, SLP)  Total assist needed to Id objects in a field of two, pt agitated  -NR   88% identifying picture from fo2 given object function  -KB  60% identifying named body parts, 80% with physical model; 50% identifying pictured items by name in fo2  -KB       Comprehend Questions Goal 1 (SLP)    Progress (Ability to Comprehend Questions Goal 1, SLP)  --  with maximum cues (25-49%)  -KB  --    Progress/Outcomes (Comprehend Questions Goal 1, SLP)  --  goal ongoing  -KB  --    Comment (Comprehend Questions Goal 1, SLP)  --  40% answering basic personal/environmental yes/no questions with visual response options, shortened questions and repetitions needed, 60% with visual cues   -KB  --       Reading Comprehension at Word Level Goal 1 (SLP)    Progress (Reading Comprehension at Word Level Goal 1, SLP)  --  with minimal cues (75-90%)  -KB  with maximum cues (25-49%)  -KB    Progress/Outcomes (Reading Comprehension at Word Level Goal 1, SLP)  --  good progress toward goal;goal ongoing  -KB  goal ongoing  -KB    Comment (Reading Comprehension at Word Level Goal 1, SLP)  --  75% matching pictured object to written word in fo2  -KB  25% matching pictured object to written word in fo2  -KB       Word Retrieval Skills Goal 1 (SLP)    Progress (Word Retrieval Skills Goal 1, SLP)  --  with maximum cues (25-49%)  -KB  --    Progress/Outcomes (Word Retrieval Goal 1, SLP)  --  goal  ongoing  -KB  --    Comment (Word Retrieval Goal 1, SLP)  --  30%, 50% ,30% close approximations when couting 1-10 with a model  -KB  --       Word Retrieval Skills Goal 2 (SLP)    Progress (Word Retrieval Skills Goal 2, SLP)  --  with maximum cues (25-49%)  -KB  --    Progress/Outcomes (Word Retrieval Goal 2, SLP)  --  goal ongoing  -KB  --    Comment (Word Retrieval Goal 2, SLP)  --  0% confrontation naming of common pictured objects, 30% with direct verbal model  -KB  --       Graphic Expression of Shapes, Letters, Numbers Goal 1 (SLP)    Improve Graphic Expression of Shapes, Letters, and Numbers Goal 1 (SLP)  copy shapes, numbers, and letters  -NR  --  --    Progress/Outcomes (Graphic Expression of Shapes, Letters, and Numbers Goal 1, SLP)  continuing progress toward goal  -NR  --  --    Comment (Graphic Expression of Shapes, Letters, and Numbers Goal 1, SLP)  Pt able to copy single printed words c extra time  and reduced legibility.  Pt able to select a crayon from a field of two intermittently and color portions of a pictured scene.  Pt was very agitated for both sessions.  -NR  --  --       Graphic Expression of Words Goal 1 (SLP)    Progress (Graphic Expression of Single Words Goal 1, SLP)  --  --  with moderate cues (50-74%)  -KB    Progress/Outcomes (Graphic Expression of Single Words Goal 1, SLP)  --  --  goal ongoing  -KB    Comment (Graphic Expression of Single Words Goal 1, SLP)  --  --  50% accuracy writing her name to written prompt, patient became frustrated on second attempt and decreased effort was noted  -KB       Planning and Execution of Connected Speech Goal 1 (SLP)    Progress (Planning and Execution of Connected Speech Goal 1, SLP)  --  with maximum cues (25-49%)  -KB  with 1:1 supervision/constant cues  -KB    Progress/Outcomes (Planning and Execution of Connected Speech Goal 1, SLP)  --  goal ongoing  -KB  goal ongoing  -KB    Comment (Planning and Execution of Connected Speech Goal 1,  SLP)  --  20% imitating bilablial CV syllables, 30% with additional max visual/verbal cues  -KB  77% imitating functional CV words  -KB    Row Name 08/22/19 1000 08/22/19 0830          Words/Phrases/Sentences Goal 1 (SLP)    Progress (Ability to Contruct Words/Phrases/Sentences Goal 1, SLP)  independently (over 90% accuracy)  -KB  with moderate cues (50-74%)  -KB     Progress/Outcomes (Identify Objects and Pictures Goal 1, SLP)  goal ongoing  -KB  goal ongoing  -KB     Comment (Words/Phrases/Sentences Goal 1, SLP)  70% identifying named body parts, dirent modeling not effective to increase performance  -KB  16% identifying named letter/number in fo5, 83% when choices reduced to fo2-3  -KB        Reading Comprehension at Word Level Goal 1 (SLP)    Progress (Reading Comprehension at Word Level Goal 1, SLP)  independently (over 90% accuracy)  -KB  with moderate cues (50-74%)  -KB     Progress/Outcomes (Reading Comprehension at Word Level Goal 1, SLP)  good progress toward goal;goal ongoing  -KB  goal ongoing  -KB     Comment (Reading Comprehension at Word Level Goal 1, SLP)  80% identifying written word in fo2 given dictated word  -KB  67% matching written word to picture, 78% with verbal cues  -KB        Word Retrieval Skills Goal 1 (SLP)    Progress (Word Retrieval Skills Goal 1, SLP)  with moderate cues (50-74%)  -KB  --     Progress/Outcomes (Word Retrieval Goal 1, SLP)  goal ongoing  -KB  --     Comment (Word Retrieval Goal 1, SLP)  20% predictable phrase completion with moderate verbal cues  -KB  --        Word Retrieval Skills Goal 2 (SLP)    Progress (Word Retrieval Skills Goal 2, SLP)  --  with 1:1 supervision/constant cues  -KB     Progress/Outcomes (Word Retrieval Goal 2, SLP)  --  goal ongoing  -KB     Comment (Word Retrieval Goal 2, SLP)  --  20%, 30%, 20% counting 1-10 with verbal model and visual support  -KB        Graphic Expression of Shapes, Letters, Numbers Goal 1 (SLP)    Progress (Graphic  Expression of Shapes, Letters, and Numbers Goal 1, SLP)  independently (over 90% accuracy)  -KB  --     Progress/Outcomes (Graphic Expression of Shapes, Letters, and Numbers Goal 1, SLP)  goal ongoing  -KB  --     Comment (Graphic Expression of Shapes, Letters, and Numbers Goal 1, SLP)  60% writing dictated letters  -KB  --        Graphic Expression of Words Goal 1 (SLP)    Progress (Graphic Expression of Single Words Goal 1, SLP)  --  with minimal cues (75-90%);with moderate cues (50-74%)  -KB     Progress/Outcomes (Graphic Expression of Single Words Goal 1, SLP)  --  good progress toward goal;goal ongoing  -KB     Comment (Graphic Expression of Single Words Goal 1, SLP)  --  65% accurate letter formation when copying written words; 90% writing her name to written prompt  -KB        Planning and Execution of Connected Speech Goal 1 (SLP)    Progress (Planning and Execution of Connected Speech Goal 1, SLP)  with moderate cues (50-74%)  -KB  --     Progress/Outcomes (Planning and Execution of Connected Speech Goal 1, SLP)  goal ongoing  -KB  --     Comment (Planning and Execution of Connected Speech Goal 1, SLP)  60% imitation of functional CV words, 90% with additional repetitions and visual cues  -KB  --        Additional Goal 1 (SLP)    Barriers (Additional Goal 1, SLP)  100% matching like 3-digit number in fo4  -KB  100% filling in blanks given written numbers 1-10 and some missig numbers  -KB       User Key  (r) = Recorded By, (t) = Taken By, (c) = Cosigned By    Initials Name Provider Type    NR Mariposa Zuluaga MA,CCC-SLP Speech and Language Pathologist    KB Bruton, Katherine L Speech and Language Pathologist             SLP Outcome Measures (last 72 hours)      SLP Outcome Measures     Row Name 08/24/19 1300             Adult FCM Scores    Verbal Expression FCM Score  2  -NR        User Key  (r) = Recorded By, (t) = Taken By, (c) = Cosigned By    Initials Name Effective Dates    NR Mariposa Zuluaga MA,CCC-SLP  06/08/18 -               Time Calculation:       Time Calculation- SLP     Row Name 08/24/19 1535 08/24/19 1534          Time Calculation- SLP    SLP Start Time  1330  -NR  0930  -NR     SLP Stop Time  1400  -NR  1000  -NR     SLP Time Calculation (min)  30 min  -NR  30 min  -NR     SLP Received On  --  08/24/19  -NR       User Key  (r) = Recorded By, (t) = Taken By, (c) = Cosigned By    Initials Name Provider Type    NR Mariposa Zuluaga MA,CCC-SLP Speech and Language Pathologist            Therapy Charges for Today     Code Description Service Date Service Provider Modifiers Qty    [id number] HC ST TREATMENT SPEECH 4 8/24/2019 Mariposa Zuluaga MA,CCC-SLP GN 1               ADULT NOMS (last 72 hours)      Adult NOMS     Row Name 08/24/19 1300                   Adult FCM Scores    Verbal Expression FCM Score  2  -NR          User Key  (r) = Recorded By, (t) = Taken By, (c) = Cosigned By    Initials Name Effective Dates    NR Mariposa Zuluaga MA,CCC-SLP 06/08/18 -                      Mariposa Zuluaga MA, CCC-SLP  8/24/2019        Electronically signed by Mariposa Zuluaga MA, CCC-SLP at 8/24/2019  3:36 PM     Progress Notes signed by Mariposa Zuluaga MA, CCC-SLP at 8/24/2019  2:47 PM         Occupational Therapy: [place]    Physical Therapy: [place]    Speech Language Pathology:  Individual: 60 minutes.    Signed by: WHIT Lamb      Electronically signed by Mariposa Zuluaga MA, CCC-SLP at 8/24/2019  2:47 PM     Progress Notes signed by Mariposa Zuluaga MA, CCC-SLP at 8/24/2019 12:25 PM         Inpatient Rehabilitation Functional Measures Assessment and Plan of Care    Plan of Care  Updated Problems/Interventions  Field    Functional Measures  KATI Eating:  Activity was not observed.  KATI Grooming: [place]  KATI Bathing:  [place]  KATI Upper Body Dressing:  [place]  KATI Lower Body Dressing:  [place]  KATI Toileting:  [place]    KATI Bladder Management  Level of Assistance:  [place]  Frequency/Number of Accidents this Shift:  [place]    KATI Bowel  Management  Level of Assistance: [place]  Frequency/Number of Accidents this Shift: [facility] Bed/Chair/Wheelchair Transfer:  [facility] Toilet Transfer:  [facility] Tub/Shower Transfer:  [place]    Previously Documented Mode of Locomotion at Discharge: Field  KATI Expected Mode of Locomotion at Discharge: [facility] Walk/Wheelchair:  [facility] Stairs:  [facility] Comprehension:  Auditory comprehension is the usual mode. Patient does not  comprehend complex/abstract information in their primary language without  assistance from a helper. Comprehension Score = 1, Total Assistance.  Patient  understands basic daily needs less than 25% of the time and/or does not  understand simple information such as single words or gestures. No assistive  devices were required.  KATI Expression:  Vocal expression is the usual mode. Patient does not express  complex/abstract information in their primary language without a helper.  Expression Score = 1, Total Assistance. Patient expresses basic daily needs less  than 25% of the time, or does not express basic needs appropriately or  consistently despite prompting. No assistive devices were required.  KATI Social Interaction:  Social Interaction Score = 3, Moderate Direction.  Patient interacts appropriately 50-74% of the time.  Patient requires  moderate/some direction for the following behavior(s):  KATI Problem Solving:  Activity was not observed.  KATI Memory:  Activity was not observed.    Therapy Mode Minutes  Occupational Therapy: [place]  Physical Therapy: [place]  Speech Language Pathology:  [place]    Signed by: WHIT Lamb      Electronically signed by Mariposa Zuluaga MA,CCC-SLP at 2019 12:25 PM     Bruton, Katherine L at 2019 12:22 PM          Inpatient Rehabilitation - Speech Language Pathology Treatment Note    [facility]       Patient Name: Darby Wokrman  : 1945  MRN: [id number]    Today's Date: 2019           Admit Date:  7/31/2019      Visit Dx:      No diagnosis found.    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)   • Right-sided headache   • Acute ischemic stroke (CMS/HCC)   • Embolic stroke (CMS/HCC)   • Cerebral infarction due to stenosis of left carotid artery (CMS/HCC)   • Anticoagulated by anticoagulation treatment   • Stroke (cerebrum) (CMS/HCC)     Therapy Treatment  Evaluation/Coping    Evaluation/Treatment Time and Intent  Subjective Information: no complaints (08/23/19 1115 : Bruton, Katherine L)  Existing Precautions/Restrictions: fall(communication, swallow) (08/23/19 1115 : Bruton, Katherine L)  Document Type: therapy note (daily note) (08/23/19 1115 : Bruton, Katherine L)  Mode of Treatment: speech-language pathology (08/23/19 1115 : Bruton, Katherine L)  Patient/Family Observations: assisted patient from bed to wc; agreeable to therapy (08/23/19 1115 : Bruton, Katherine L)  Start Time (Evaluation/Treatment): 1115 (08/23/19 1115 : Bruton, Katherine L)  Stop Time (Evaluation/Treatment): 1145 (08/23/19 1115 : Bruton, Katherine L)    Vitals/Pain/Safety    Pain Scale: Numbers Pre/Post-Treatment  Pain Scale: Numbers, Pretreatment: 0/10 - no pain (08/23/19 1115 : Bruton, Katherine L)  Pain Scale: Numbers, Post-Treatment: 0/10 - no pain (08/23/19 1115 : Bruton, Katherine L)    EDUCATION  The patient has been educated in the following areas:   Cognitive Impairment Communication Impairment.    SLP GOALS     Row Name 08/23/19 1115 08/23/19 0830 08/22/19 1000       Words/Phrases/Sentences Goal 1 (SLP)    Progress (Ability to Contruct Words/Phrases/Sentences Goal 1, SLP)  with minimal cues (75-90%)  -KB  with moderate cues (50-74%);with maximum cues (25-49%)  -KB  independently (over 90% accuracy)  -KB    Progress/Outcomes (Identify Objects and Pictures  Goal 1, SLP)  good progress toward goal;goal ongoing  -KB  goal ongoing  -KB  goal ongoing  -KB    Comment (Words/Phrases/Sentences Goal 1, SLP)   88% identifying picture from fo2 given object function  -KB  60% identifying named body parts, 80% with physical model; 50% identifying pictured items by name in fo2  -KB  70% identifying named body parts, dirent modeling not effective to increase performance  -KB       Comprehend Questions Goal 1 (SLP)    Progress (Ability to Comprehend Questions Goal 1, SLP)  with maximum cues (25-49%)  -KB  --  --    Progress/Outcomes (Comprehend Questions Goal 1, SLP)  goal ongoing  -KB  --  --    Comment (Comprehend Questions Goal 1, SLP)  40% answering basic personal/environmental yes/no questions with visual response options, shortened questions and repetitions needed, 60% with visual cues   -KB  --  --       Reading Comprehension at Word Level Goal 1 (SLP)    Progress (Reading Comprehension at Word Level Goal 1, SLP)  with minimal cues (75-90%)  -KB  with maximum cues (25-49%)  -KB  independently (over 90% accuracy)  -KB    Progress/Outcomes (Reading Comprehension at Word Level Goal 1, SLP)  good progress toward goal;goal ongoing  -KB  goal ongoing  -KB  good progress toward goal;goal ongoing  -KB    Comment (Reading Comprehension at Word Level Goal 1, SLP)  75% matching pictured object to written word in fo2  -KB  25% matching pictured object to written word in fo2  -KB  80% identifying written word in fo2 given dictated word  -KB       Word Retrieval Skills Goal 1 (SLP)    Progress (Word Retrieval Skills Goal 1, SLP)  with maximum cues (25-49%)  -KB  --  with moderate cues (50-74%)  -KB    Progress/Outcomes (Word Retrieval Goal 1, SLP)  goal ongoing  -KB  --  goal ongoing  -KB    Comment (Word Retrieval Goal 1, SLP)  30%, 50% ,30% close approximations when couting 1-10 with a model  -KB  --  20% predictable phrase completion with moderate verbal cues  -KB       Word Retrieval  Skills Goal 2 (SLP)    Progress (Word Retrieval Skills Goal 2, SLP)  with maximum cues (25-49%)  -KB  --  --    Progress/Outcomes (Word Retrieval Goal 2, SLP)  goal ongoing  -KB  --  --    Comment (Word Retrieval Goal 2, SLP)  0% confrontation naming of common pictured objects, 30% with direct verbal model  -KB  --  --       Graphic Expression of Shapes, Letters, Numbers Goal 1 (SLP)    Progress (Graphic Expression of Shapes, Letters, and Numbers Goal 1, SLP)  --  --  independently (over 90% accuracy)  -KB    Progress/Outcomes (Graphic Expression of Shapes, Letters, and Numbers Goal 1, SLP)  --  --  goal ongoing  -KB    Comment (Graphic Expression of Shapes, Letters, and Numbers Goal 1, SLP)  --  --  60% writing dictated letters  -KB       Graphic Expression of Words Goal 1 (SLP)    Progress (Graphic Expression of Single Words Goal 1, SLP)  --  with moderate cues (50-74%)  -KB  --    Progress/Outcomes (Graphic Expression of Single Words Goal 1, SLP)  --  goal ongoing  -KB  --    Comment (Graphic Expression of Single Words Goal 1, SLP)  --  50% accuracy writing her name to written prompt, patient became frustrated on second attempt and decreased effort was noted  -KB  --       Planning and Execution of Connected Speech Goal 1 (SLP)    Progress (Planning and Execution of Connected Speech Goal 1, SLP)  with maximum cues (25-49%)  -KB  with 1:1 supervision/constant cues  -KB  with moderate cues (50-74%)  -KB    Progress/Outcomes (Planning and Execution of Connected Speech Goal 1, SLP)  goal ongoing  -KB  goal ongoing  -KB  goal ongoing  -KB    Comment (Planning and Execution of Connected Speech Goal 1, SLP)  20% imitating bilablial CV syllables, 30% with additional max visual/verbal cues  -KB  77% imitating functional CV words  -KB  60% imitation of functional CV words, 90% with additional repetitions and visual cues  -KB       Additional Goal 1 (SLP)    Barriers (Additional Goal 1, SLP)  --  --  100% matching like  "3-digit number in fo4  -KB    Row Name 08/22/19 0830 08/21/19 1100 08/21/19 0900       Words/Phrases/Sentences Goal 1 (SLP)    Progress (Ability to Contruct Words/Phrases/Sentences Goal 1, SLP)  with moderate cues (50-74%)  -KB  --  --    Progress/Outcomes (Identify Objects and Pictures Goal 1, SLP)  goal ongoing  -KB  --  goal ongoing  -KB    Comment (Words/Phrases/Sentences Goal 1, SLP)  16% identifying named letter/number in fo5, 83% when choices reduced to fo2-3  -KB  --  Attempted basic object identification by naming objects around the room. When asked if patient wanted TV turned on, she pointed her remote toward the television but would not accept assistance to turn on TV. She did not identify other named object attempted (x5).  -KB       Comprehend Questions Goal 1 (SLP)    Progress/Outcomes (Comprehend Questions Goal 1, SLP)  --  goal ongoing  -KB  goal ongoing  -KB    Comment (Comprehend Questions Goal 1, SLP)  --  responded \"no\" to all yes/no questions asked about her family with visual picture book left by a family member  -KB  yes/no questions related to immediate wants/needs asked throughout session, patient perseverated on \"no\" response  -KB       Reading Comprehension at Word Level Goal 1 (SLP)    Progress (Reading Comprehension at Word Level Goal 1, SLP)  with moderate cues (50-74%)  -KB  --  --    Progress/Outcomes (Reading Comprehension at Word Level Goal 1, SLP)  goal ongoing  -KB  --  goal ongoing  -KB    Comment (Reading Comprehension at Word Level Goal 1, SLP)  67% matching written word to picture, 78% with verbal cues  -KB  --  Attempted worksheet matching written words to pictures. She was not able to sustain attention adequately to complete task, as she appeared distracted by her frustration.   -KB       Word Retrieval Skills Goal 1 (SLP)    Progress/Outcomes (Word Retrieval Goal 1, SLP)  --  goal ongoing  -KB  goal ongoing  -KB    Comment (Word Retrieval Goal 1, SLP)  --  Verbal output " "consistent of perseverative \"yes/no\" responses to all attempts at communicating. She mostly responded \"no\" except for responding to a few questions regarding immediate environment. Yes responses did not appear appropriate, as patient would then refuse having task carried out (i.e. stated \"yea\" when asked if she wanted her TV on, but would not allow assistance with remote).   -KB  Verbal output consistent of perseverative \"no\" except when asked if I could sit her up in bed, if she wanted her family, and if she wanted the TV on. She responded \"yea\" to these questions.   -KB       Word Retrieval Skills Goal 2 (SLP)    Progress (Word Retrieval Skills Goal 2, SLP)  with 1:1 supervision/constant cues  -KB  --  --    Progress/Outcomes (Word Retrieval Goal 2, SLP)  goal ongoing  -KB  --  --    Comment (Word Retrieval Goal 2, SLP)  20%, 30%, 20% counting 1-10 with verbal model and visual support  -KB  --  --       Graphic Expression of Words Goal 1 (SLP)    Progress (Graphic Expression of Single Words Goal 1, SLP)  with minimal cues (75-90%);with moderate cues (50-74%)  -KB  --  --    Progress/Outcomes (Graphic Expression of Single Words Goal 1, SLP)  good progress toward goal;goal ongoing  -KB  --  --    Comment (Graphic Expression of Single Words Goal 1, SLP)  65% accurate letter formation when copying written words; 90% writing her name to written prompt  -KB  --  --       Augmentative/Alternative Communication Objectives Goal 1 (SLP    Progress/Outcomes (Augmentative/Alternative Communication Goal 1, SLP)  --  goal ongoing  -KB  --    Comment (Augmentative/Alternative Communication Goal 1, SLP)  --  basic picture communication board utilized to offer patient to be taken to the bathroom and to be taken to lunch; she responded \"no\" to both attempts  -KB  --       Additional Goal 1 (SLP)    Barriers (Additional Goal 1, SLP)  100% filling in blanks given written numbers 1-10 and some missig numbers  -KB  --  --      User Key "  (r) = Recorded By, (t) = Taken By, (c) = Cosigned By    Initials Name Provider Type    KB Bruton, Katherine L Speech and Language Pathologist          Time Calculation:   Time Calculation- SLP     Row Name 08/23/19 1145 08/23/19 0900          Time Calculation- SLP    SLP Start Time  1115  -KB  0830  -KB     SLP Stop Time  1145  -KB  0900  -KB     SLP Time Calculation (min)  30 min  -KB  30 min  -KB       User Key  (r) = Recorded By, (t) = Taken By, (c) = Cosigned By    Initials Name Provider Type    KB Bruton, Katherine L Speech and Language Pathologist        Therapy Charges for Today     Code Description Service Date Service Provider Modifiers Qty    41616797061  ST TREATMENT SPEECH 4 8/22/2019 Bruton, Katherine L GN 1    [id number]  ST TREATMENT SPEECH 4 8/23/2019 Bruton, Katherine L GN 1        Katherine L Bruton  8/23/2019        Electronically signed by Bruton, Katherine L at 8/23/2019 12:23 PM     Progress Notes signed by Bruton, Katherine L at 8/23/2019 12:38 PM         Occupational Therapy: Branch    Physical Therapy: Branch    Speech Language Pathology:  Individual: 60 minutes.    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  Dysphagia- History of multiple bilateral strokes and left central retinal artery occlusion  History of left greater than right internal carotid artery stenosis-status post left internal carotid artery stent July 17, 2019  History of hypertension  History of diabetes mellitus  Anemia  Vitamin D deficiency    History of Present Illness  Patient is a 74-year-old female with a history of diabetes  mellitus, hypertension, hyperlipidemia with a history of multiple bilateral ischemic infarcts, largest involving left occipital left temporal lobe, and  history of left central retinal artery occlusion in May of this year, admission from May 14 to May 18..  Also noted was left greater than right internal carotid artery stenosis and left P2 stenosis.  She was on aspirin and Plavix.  ZIO patch placed. She had readmission to the hospital on May 21 with findings of left temple, left temporal parietal, left occipital and multiple bilateral ischemic infarcts.  Regimen was changed to aspirin and Eliquis.  Left internal carotid artery 85% stenosis of right internal carotid artery 60% stenosis.  Carotid duplex on June 17 showed left internal carotid artery 80-99% stenosis in right internal carotid artery 50-59% stenosis.       She therefore on 7/17/2019 underwent left internal carotid artery stent.  Postoperatively CT of the brain on July 19 showed multiple old small bilateral infarcts as well as very subtle loss of gray-white matter differentiation that involves the entire left corona radiata region, internal capsule, putamen, external capsule, insular cortex, left temporal lobe, posterior inferior left frontal and anterior and lateral left parietal lobe that measures up to 8.4 x 4.5 x 4.5 cm in anteroposterior and mediolateral and craniocaudal dimension - compatible with a very subtle acute left middle cerebral artery territory infarct and there is additional patchy area of intraparenchymal hemorrhage in the posterior lateral left parietal-occipital region that measures 2 x 1.5 cm in size likely a petechial hemorrhage at the posterior margin of the infarct. Ventricles are currently normal in size. There is no significant mass effect or midline shift.     She has had a aphasia and spastic right hemiparesis.  She has dysphagia and was on Cortrak tube feeds.    On July 25 with physical therapy transfers moderate assist of 2.   Sitting balance contact-guard assist.  She  had decreased alertness.  On July 25 she underwent CTA/perfusion.  The left internal carotid artery stent and the left middle cerebral artery were patent.  Perfusion images with no significant findings.  July 26 with the nursing staff she took a couple steps to the chair at the bedside.    She has shown more alertness and participation in therapy.    On July 27-amantadine added for neuro stimulation  On July 29-Aphasia, some short response Gait 30 feet mod/min x 2.  Toileting dependent. Requires max verbal cues with OT.  DHT feeds. Improved performance in therapies.  On July 31-underwent a videofluoroscopic swallow study.  Started on puréed/honey thick liquid diet.    VFSS completed. Recommend pureed diet with HTL; meds crushed in pureed; upright for meals and 30 min after; slow rate; small bites/sips; no straws; supervision with meals. Pt's cognitive status played a part in swallowing. ST to follow for diet tolerance and reeval as indicated.  With physical therapy on July 30 patient was more verbal.  She said occasional single word.  Minimal assist with bed mobility.  Transfers minimal assist 2.  Amylase 30 feet minimum assist of 2/moderate assist.  Requires verbal cues during ambulation for him placement on the walker and assistance with guidance of the rolling walker.  She continues with bilateral wrist restraints.  She continues with aphasia.  She will make some utterances.  She will do occasional word but not in the context of the question.  She was not able to state her name.     Given her functional impairments and comorbidities she is now admitted for acute inpatient rehab           Review of Systems   Not obtainable given the patient's aphasia  Medical History        Past Medical History:   Diagnosis Date   • Acute cerebrovascular accident (CVA) of cerebellum (CMS/HCC)     • Acute ischemic stroke (CMS/HCC)     • Arthritis     • Coronary artery disease     • CVA  (cerebral vascular accident) (CMS/HCC)     • Diabetes mellitus (CMS/HCC)     • Heart attack (CMS/HCC)     • History of blood clots     • Hyperlipidemia     • Hypertension     • Vision loss           Surgical History         Past Surgical History:   Procedure Laterality Date   • CAROTID ENDARTERECTOMY Left 7/17/2019     Procedure: Left carotid endarterectomy;  Surgeon: Rupert Oliva MD;  Location: [facility];  Service: Neurosurgery   • EMBOLECTOMY Left 7/17/2019     Procedure: CEREBRAL ANGIOGRAM, LEFT INTERNAL CAROTID ARTERY STENT;  Surgeon: Rupert Oliva MD;  Location: [facility] OR 18/19;  Service: Neurosurgery               Family History   Problem Relation Age of Onset   • Arthritis Mother     • Heart disease Father     • Breast cancer Neg Hx     • Malig Hyperthermia Neg Hx        Social History            Tobacco Use   • Smoking status: Never Smoker   • Smokeless tobacco: Never Used   Substance Use Topics   • Alcohol use: No       Frequency: Never   • Drug use: No   .  Lives with her .      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  If add Namenda, will probably discontinue amantadine as she continues alert.  August 11-she has some increased irritability at times.  This may be related to the underlying stroke deficits itself.  She does not appear to have any dysuria, no odor to her urine.  Staff reports that for the most part she is eating okay.  Will check a follow-up CT of the head to assess for any interval visual changes.  She is on aspirin and Eliquis for stroke prophylaxis.  She had noticeably improved alertness when amantadine was started.  She is readily alert now.  This may be related to natural recovery in terms of her level of arousal at this point.   Current plan is to discontinue the amantadine to see if that helps with her irritability and start  on Namenda for aphasia.  August 12 -  Patient with increased restlessness at times.   Continues aphasia. Not able to identify any complaint.  Appears anxious today. No change on CT head yesterday. Started on Namenda for aphasia on 8/12/19.   August 13- will continue to monitor on recent med adjustments   August 15-She continues with expressive language deficits.    Again appears anxious related to her aphasia and difficulty expressing herself.    She will be able to get occasional single word for the examiner.  She does follow instructions.  The patient was reviewed with .  Discussed adding on an antidepressant with anxiolytic properties -Paxil-as it appears frustration from her aphasia with associated anxiety with the frustration affects her performance.  We will plan on starting Paxil 10 mg daily tomorrow and monitor response.  Reviewed with the patient's  that would not add a short acting anxiolytic (such as a benzodiazepine or Seroquel) as it may affect her cognitive performance.    August 16-started Paxil 10 mg daily  August 19 - Increased restlessness Friday night - ? Related to UTI vs initiation of Paxil. On Cefdinir for GNR pending ID &Sensitivity.  August 20 -urine culture with E. coli ESBL.  Given her aphasia not able to obtain information regarding dysuria.  As the urinalysis was ordered as part of work-up for increased restlessness this weekend, would have to treat as symptomatic although could be asymptomatic bacteriuria.  Will place on ertapenem 1 g IV daily for 3-5 days.  August 21 - increased restlessness/anxiety today. Labs without any acute change. Blood glucose okay this AM. BP pattern okay. No gross motor changes. Follow on current regimen for now.   Addendum-Patient continued today with  increased restlessness/anxiety/refusing aspects of nursing care. She had had a good day in  "therapies yesterday. She is calmer now with family present. Did allow IV antibiotic to be infused but has not taken PO meds or eaten dinner yet.   Discussed with patient's /children will need to hold on planned discharge tomorrow given her mood/behavior today.   I do not feel Namenda (started as trial for aphasia) is related as had episodes of restlessness prior to starting, but will discontinue for now so  slate with psychoactive medications. Continue Paxil for now. Will consult Psychiatry for input.  August 22-patient more cooperative today, taking medications and will participate in therapies.  Still perseverates on \"no\".  Still with some anxiety related to her aphasia but less than yesterday.  Seen by psychiatry and to continue with Paxil 10 mg daily.  Also order written for Zyprexa 5 mg IM every 8 hours as needed agitation.  To observe on current regimen.  August 26 - still gets anxious/frustrated by aphasia. Not participate with therapies or eat meal at times. Continues on Paxil. Will ask Psychiatry for any additional thoughts in AM.   August 27-patient received Zyprexa 5 mg IM at about 830 last evening.  Slept last night.  She is not anxious this morning.  Participating with activities.  Will plan to get updated assessment from psychiatry for any adjustment in regimen before discharge to subacute - per facility cannot go on a prn medication. Addendum: per Psychiatry,  recommend giving Paxil opportunity to exert its clinical effect over the next 1-1/2 to 2 weeks.  August 28-she did better last evening per nursing.  Reported to sleep well.  She is received Zyprexa as needed only once in the past 6 days. Patient reviewed with Psychiatry.     Will place on melatonin 3 mg at nighttime scheduled for sleep.  As patient's overall status appears stable and not making any new interventions at this time, allowing Paxil and opportunity to take effect, [facility], but no bed " available until the end of the week.  She will need follow-up assessments by the physicians at that facility to adjust her medication - Paxil - as needed.  August 29-patient was more restless last evening, required multiple staff members in the room. She continues with restlessness, and has been more resistant to care.  She is been pushing staff away or hanging onto staff.  Not taking her medications consistently.  Did not eat breakfast.  She will put her foot on the door frame when staff is trying to move out of the room in her wheelchair.  She received Zyprexa 5 mg IM on this morning without any change in her mood/behavior.  Patient has been reviewed by the psychiatric service and plan is to transfer to the [facility] unit for further assessment and management with adjustment in her medications to help with her mood/behavior.  Discussed with the patient's  and he is in agreement with this plan.  She will transferred to the [facility] today.    Aphasia -  August 12 - trial of Namenda  August 16-continue to observe on Namenda 5 mg daily for her aphasia and may possibly titrate up next week  August 20-titrate up on Namenda to 5 mg twice a day  August 22-discontinued Namenda.  Do not feel related to her anxiety/restlessness as it was present prior to starting but discontinued to avoid any additional psychoactive medications that might add to it.  Had not seen any improvement in her aphasia with the very brief trial.     Dysphagia-Cortrak feeding tube discontinued on July 31 and started on puréed/honey thick liquid diet with strategies  August 7-advance to fork mash nectar thick liquid diet, consistent carbohydrate.  August 14 - repeat VFSS to assess for advancing to thin liquids  August 15-Video fluoroscopic swallow study today-mechanical soft, no mixed consistency, nectar thick liquid, water protocol between meals, no straws. May take meds whole in Puree or NTL as tolerated. May have ice chips or sips of water in between  meals after oral care per protocol.  August 28 - Pt is tolerating trials of regular with thins by cup.   - upgrade to regular with thins, no straws. She continues to need 1:1 supervision for meals  . She can take meds crushed in puree.     History of multiple bilateral strokes and left central retinal artery occlusion     History of left greater than right internal carotid artery stenosis-status post left internal carotid artery stent July 17, 2019  Follow with Neurosurgery in office in October with carotid ultrasound     History of hypertension -  Hyzaar 100-25. August 4 - Bystolic increased to 20 mg daily to 20 mg bid.  August 8 - BP still runs high. On discharge in May 2019 was on Hyzaar 100-25 mg daily, Bystolic 20 mg daily, amlodipine 10 mg daily, Hydralazine 50 mg q 8 hours.  Will add Hydralazine initially 10 mg po q 8 hours and titrate up as needed.   August 9-systolic blood pressure elevated last night and early this morning but better this afternoon at 122-130.  Continue to follow recent addition of hydralazine and titrate up as needed  August 10-systolic blood pressure 175 this afternoon, range otherwise 122-142.  Will titrate up on hydralazine to 20 mg every 8 hours.  August 11-hypertension overall appears improved.  Will titrate up further to 25 mg every 8 hours.  August 12 - add amlodipine 5 mg daily.   August 14 - titrate up on hydralazine to 50 mg q 8 hours  August 15-blood pressure improved this afternoon with systolic blood pressure in the 120s-follow on current regimen  August 16-blood pressure range 110////59-will continue to monitor on present regimen  August 19 - /74 early AM, later 136/71 - increase amlodipine to 5 mg bid  August 28-blood pressure range 112-140/60-68     History of diabetes mellitus -Lantus/glipizide (home medication metformin 1000 mg twice daily and glipizide 10 mg twice daily)  August 1-blood sugars were low yesterday afternoon after tube feeds  discontinued.  Held glipizide last night and this morning and Lantus last night.  Blood sugar elevated at noontime today.  Will receive resume glipizide at a lower dose of 5 mg twice a day with meals.  Continue sliding scale insulin coverage.  She also is on metformin at home.  August 5-add back metformin initially at 500 mg twice a day and continue glipizide 5 mg twice a day, both one half of previous home dose, titrate up as needed.  August 7-titrate up metformin to 850 mg twice a day.  Add consistent carbohydrate restriction to diet.  August 9-increase metformin to 1000 g twice a day.  August 10-blood glucose 340 last evening but [phone] so far today  August 11-continue to follow blood sugar pattern and may increase glipizide further as current dose glipizide 5 mg twice a day along with metformin 1000 mg twice a day  August 14 - blood glucose  past 24 hours - monitor pattern.  August 15-blood glucose [phone]--183-66-continue to follow pattern.  Will change sliding scale insulin coverage to Humalog at a lower dose.  She was started on the regular insulin sliding scale when she was on tube feeds.  August 16 - recent blood glucose -032-138  August 19 - recent blood glucose [phone] - increase glipizide to 7.5 mg bid  August 21 - refused blood glucose check today. Will decrease glipizide back to 5 mg bid to avoid potential for hypoglycemia until allow blood glucose check.   August 28-recent blood glucose checks [phone] this morning.  Continues on metformin thousand milligrams twice a day and glipizide 5 mg twice a day.  The one low blood sugar yesterday afternoon is not typical and will not make any adjustments today in her regimen but would continue to follow and possibly decrease morning glipizide dose if needed.     Stroke prophylaxis-aspirin/Eliquis/atorvastatin     Anemia-August 1-hemoglobin 7.4.  Most recent check on July 23 HGB 9.3.  Will recheck hemoglobin this  afternoon.  Hemoccult stool.  Iron studies.  Reticulocyte count.  Recheck CBC in the a.m.  Added Protonix.  Patient is on aspirin and Eliquis.  With recent stroke  will look to transfuse packed red blood cell.  August 2-hemoglobin improved 9.0-1 unit packed red blood cells.  Elevated reticulocyte count in response to anemia.  Nursing describes dark stool.  Patient discussed with gastroenterology.  At this point unable to do endoscopy as on Eliquis and aspirin.  Will treat symptomatically for now with increasing proton pump inhibitor and transfusing as needed.  August 5-anemia improved-hemoglobin 9.8  August 16-hemoglobin unchanged 9.8  August 28-hemoglobin remained stable on protein pump inhibitor twice a day.     DVT prophylaxis-SCDs/anticoagulation     Impulsivity-   Nursing to do re-orientation with the patient.  Room close to the nursing station.     Endocrine-vitamin D deficiency-ergocalciferol 50,000 units weekly x8 weeks added. Vitamin B12 level and TSH checked in late May unremarkable     Urinary tract infection-August 20 -urine culture with E. coli ESBL.  Given her aphasia not able to obtain information regarding dysuria.  As the urinalysis was ordered as part of work-up for increased restlessness this weekend, would have to treat as symptomatic although could be asymptomatic bacteriuria.  Placed on ertapenem 1 g IV daily for 3  days.     Impaired cognition/impaired language, impaired swallow, impaired activity daily living, impaired mobility     REHAB- admit for comprehensive acute inpatient rehabilitation .  This would be an interdisciplinary program with physical therapy 1 hour,  occupational therapy 1 hour, and speech therapy 1 hour, 5 days a week.  Rehabilitation nursing for carryover, monitoring of cardiopulmonary and neurologic   status, bowel and bladder, and skin  Ongoing physician follow-up.  Weekly team conferences.  Goals are indeterminant.   Rehabilitation prognosis determined.  Medical  prognosis determined.  Estimated length of stay is indeterminate.     TEAM CONF - AUGUST 6- TRANFERS CTG. GAIT UP  FEET CTG-MIN ASSIST. UBD MIN. LBD MOD. BATH MOD. SEVERE APHASIA. INCREASED RESISTANCE TO PARTICIPATE AT TIMES.   PUREE/HTL.  GOOD PO INTAKE. ADJUSTING MEDS FOR DIABETES MELLITUS.  BLADDER CONTINENT/INCONTIENT.   ELOS- 2 WEEKS.      TEAM CONF - AUGUST 13 - DID GREAT WITH PT ON Saturday, FOLLOWING COMMANDS AND BATTING A BALL WITH ANOTHER PATIENT. YESTERDAY, VERY FRUSTRATED AND IRRITABLE.  FRUSTRATED BY APHASIA, TRYING TO TELL STAFF SOMETHING. WILL HOLD ON TO OBJECTS, REPEAT SINGLE WORD.   BED CTG. 4 STAIRS CTG.  GAIT 220 FEET CTG-SBA NO DEVICE.  TOILET TRANSFERS CTG-MIN.  GROOMING MIN.  UBD MIN ASSIST FOR BRA, LBD CTG-MIN. BATH CTG-MIN. COORDINATION RUE BETTER.  DYSPHAGIA - IMPROVED - FORK TALA, NTL. DO NOT FEEL SHE WOULD TOLERATE E-STIM. RECEPTIVE LANGUAGE IMPROVED SLIGHTLY , POINTING TO OBJECTS. EXPRESSIVELY PERSEVERATIVE ON A SINGLE WORD, TRIES TO USE PICTURE BOARD TO COMMUNICATE. YES/NO NOT ACCURATE.  CONTINENT BOWEL AND BLADDER, TIMED VOIDS. SKIN INTACT. TYPICALLY GOOD INTAKE.  ELOS - 1.5 WEEKS     AUGUST 14 - In therapies - In OT, increased participation noted with  present   Transfers SBA/CTG  Walked from therapy gym to room without AD SBA and vc's for directions back to the room   300' outdoors on sidewalk, incline; 300', 180', 100' up on rehab unit. Pt was able to find her room when she got close to it  Bathing, dressing, grooming min assist. Toileting moderate assist.  Pt participated in using R hand to manipualte round pegs into peg board by color with MIN difficutly once pt caught on to the task. with 3D block recreation with R hand with 100% color match, 80% accuracy with block recreation  With SLP, patient was not able to effectively communicate her wants/needs but refused to go to therapy office by planting her heels while rolling in wc down the [place]; tx session completed in  her room      August 15-The patient was reviewed with .  Discussed adding on an antidepressant with anxiolytic properties -Paxil-as it appears frustration from her aphasia with associated anxiety with the frustration affects her performance.  We will plan on starting Paxil 10 mg daily tomorrow and monitor response.  Reviewed with the patient's  that would not add a short acting anxiolytic (such as a benzodiazepine or Seroquel) as it may affect her cognitive performance.    August 16-Patient was reviewed with her daughter today including rehabilitation goals, discharge planning, and recent medication adjustment to try to help with her anxiety/frustration with her aphasia.  Reviewed with the daughter that on discussion with the team is felt that she would do better with her functional status/aphasia/anxiety if able to be discharged to home environment with more frequent interactions but if that is not feasible with the need to look at subacute options.     TEAM JEFFREY - AUGUST 20 - GAIT CTG- FEET. NO DEVICE, WALKS TO AND FROM GYM.  ADLS CTG WITH CUING.  PLAYED ENTIRE GAME OF CHECKERS YESTERDAY. VARIABLE PERFORMANCE WITH SPEECH THERAPY 20-90% MATCHING WORDS TO PICTURES.    MECH SOFT, GROUND MEAT, NECTAR THICK LIQUIDS.   TYPICALLY CONTINENT BLADDER BUT INCONTINENT BOWEL. DID FINE LAST EVENING PER SISTER AND DID NOT TRY TO GET UP- WILL DISCONTINUE SITTER. IN ROOM CLOSE TO NURSING STATION.   [facility] WILL WORK TOWARD [facility] PLACEMENT      August 23-upper body dressing set up, lower body dressing min assist.  Grooming supervision.  Transfers standby assist.  Ambulated 220 feet without a device contact-guard standby assist.  She spent in therapy this morning but not with physical therapy this afternoon     TEAM JEFFREY - AUGUST 27 - CONTINUES WITH APHASIA. WORKED ON MELODIC INTONATION. BED SBA. 4 STAIRS CTG. GAIT 300 FEET SBA. TOILET TRANSFERS SBA. SHOWER TRANSFERS SBA. VOIDING ON OWN.  "UBD MIN ASSIST. LBD CTG-MIN. BATH CTG. DIET - MECH SOFT, GROUND MEATS, NECTAR THICK LIQUIDS. NOT ABLE TO MANAGE AT HOME IN SHORT TERM. WILL ASK PSYCHIATRY SEE IN FOLLOW UP FOR ANY FINAL ADJUSTMENTS IN REGIMEN BEFORE DISCHARGE TO SUBACUTE.  .  ELOS- DISPOSITION PENDING.      August 28 -  As patient's overall status appears stable and not making any new interventions at this time, allowing Paxil and opportunity to take effect, will look at discharge to subacute at [facility] today.  She will need follow-up assessments by the physicians at that facility to adjust her medication as needed.          Physical Exam     MENTAL STATUS -  AWAKE / ALERT.  NOT ANXIOUS PRESENTLY. SMILING.  HEENT- NCAT,   SCLERA NON-ICTERIC, CONJUNCTIVA PINK, OP MOIST, NO JVD   LUNGS - normal respirations.  Clear to auscultation  HEART- RRR   ABD -   non-distended  EXT - NO EDEMA OR CYANOSIS  NEURO -alert.  Aphasia.  She will get occasional single word such as 'yes\" or \"no\" out. Follows instructions.    MOTOR EXAM -takes resistance bilaterally.           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 There are stable areas of  encephalomalacia in the left parietal and occipital lobes medially.  Generalized atrophy. There are moderately extensive scattered areas of  decreased density in the white matter likely related to chronic ischemic  gliotic changes.    No hydrocephalus.     Glucose   Date/Time Value Ref Range Status   08/29/2019 0728 153 (H) 70 - 130 mg/dL Final   08/28/2019 1057 208 (H) 70 - 130 mg/dL Final   08/28/2019 0704 92 70 - 130 mg/dL Final   08/27/2019 2044 194 (H) 70 - 130 mg/dL Final   08/27/2019 1631 77 70 - 130 mg/dL Final   08/27/2019 1617 64 (L) 70 - 130 mg/dL Final   08/27/2019 1113 138 (H) 70 - 130 mg/dL Final   08/27/2019 0726 125 70 - 130 mg/dL Final     Results from last 7 days   Lab Units " 08/28/19 0711 08/26/19 0658 08/23/19  0631   SODIUM mmol/L 139 141 137   POTASSIUM mmol/L 3.7 3.6 3.6   CHLORIDE mmol/L 102 104 101   CO2 mmol/L 27.9 27.0 28.4   BUN mg/dL 20 20 17   CREATININE mg/dL 0.85 0.72 0.73   CALCIUM mg/dL 9.1 8.5* 9.1   BILIRUBIN mg/dL 0.3  --   --    ALK PHOS U/L 88  --   --    ALT (SGPT) U/L 13  --   --    AST (SGOT) U/L 19  --   --    GLUCOSE mg/dL 76 142* 108*     Results from last 7 days   Lab Units 08/28/19 0711 08/26/19 0658 08/23/19 0631   WBC 10*3/mm3 5.57 5.67 4.21   HEMOGLOBIN g/dL 9.7* 9.7* 9.5*   HEMATOCRIT % 30.5* 31.1* 30.6*   PLATELETS 10*3/mm3 254 270 306             Ref. Range 5/22/2019 05:44 5/22/2019 11:34 7/9/2019 08:25 7/18/2019 04:49 7/19/2019 05:05 7/23/2019 05:56 8/1/2019 06:48   Hemoglobin Latest Ref Range: 12.0 - 15.9 g/dL 10.8 (L)   10.6 (L) 8.5 (L) 9.7 (L) 9.3 (L) 7.4 (L)   Hematocrit Latest Ref Range: 34.0 - 46.6 % 35.1   33.4 (L) 26.2 (L) 29.9 (L) 28.6 (L) 23.6 (L)           Results from last 7 days   Lab Units 08/28/19  0711   CHOLESTEROL mg/dL 95   TRIGLYCERIDES mg/dL 65   HDL CHOL mg/dL 40   LDL CHOL mg/dL 42            Ref. Range 8/1/2019 06:47   25 Hydroxy, Vitamin D Latest Ref Range: 30.0 - 100.0 ng/ml 21.8 (L)        Ref. Range 8/1/2019 06:47   Total Cholesterol Latest Ref Range: 0 - 200 mg/dL 105   HDL Cholesterol Latest Ref Range: 40 - 60 mg/dL 40   LDL Cholesterol  Latest Ref Range: 0 - 100 mg/dL 57   VLDL Cholesterol Latest Ref Range: 5 - 40 mg/dL 8   Triglycerides Latest Ref Range: 0 - 150 mg/dL        Name Relationship Specialty Phone Fax Address Order              Eric Matta MD  PCP - General General Practice [phone] [address] [facility] [address]  [facility] [address]     Next Steps: Follow up      Maine Starr, APRN   Neurology [phone] [address] [place] [address]     Next Steps: Follow up      Instructions: CALL FOR APPOINTMENT      Greta Swan, APRN   Neurosurgery [phone]  [address] [address]     Next Steps: Follow up in 2 month(s)      Instructions: we will arrange        Inter-facility transfer medications (From admission, onward)            dextrose (D50W) 25 g/ 50mL Intravenous Solution 25 g  Every 15 Minutes PRN          dextrose (GLUTOSE) oral gel 15 g  Every 15 Minutes PRN          glucagon (human recombinant) (GLUCAGEN DIAGNOSTIC) injection 1 mg  As Needed          nitroglycerin (NITROSTAT) SL tablet 0.4 mg  Every 5 Minutes PRN          apixaban (ELIQUIS) tablet 5 mg  Every 12 Hours Scheduled          aspirin tablet 325 mg  Daily          atorvastatin (LIPITOR) tablet 80 mg  Nightly          brimonidine (ALPHAGAN) 0.2 % ophthalmic solution 1 drop  2 Times Daily          dorzolamide (TRUSOPT) 2 % ophthalmic solution 1 drop  2 Times Daily          losartan (COZAAR) 100 mg, hydrochlorothiazide (MICROZIDE) 12.5 mg for HYZAAR 100-12.5  Daily          nystatin (MYCOSTATIN) 050351 UNIT/ML suspension 500,000 Units  4 Times Daily          nebivolol (BYSTOLIC) tablet 20 mg  2 Times Daily          aluminum-magnesium hydroxide-simethicone (MAALOX MAX) 400-400-40 MG/5ML suspension 15 mL  Every 6 Hours PRN          metFORMIN (GLUCOPHAGE) tablet 1,000 mg  2 Times Daily With Meals          potassium chloride (KLOR-CON) packet 20 mEq  Daily With Breakfast          hydrALAZINE (APRESOLINE) tablet 50 mg  Every 8 Hours Scheduled          insulin lispro (humaLOG) injection 0-7 Units  (Correction Insulin - Low Dose (Total Insulin Dose Less Than 40 units/day, Lean Patients, Elderly Patients or Renal Patients))  4 Times Daily With Meals & Nightly          PARoxetine (PAXIL) tablet 10 mg  Daily          acetaminophen (TYLENOL) tablet 1,000 mg  Every 6 Hours PRN          amLODIPine (NORVASC) tablet 5 mg  2 Times Daily - RT          glipiZIDE (GLUCOTROL) tablet 5 mg  2 Times Daily Before Meals          OLANZapine (zyPREXA) injection 5 mg  Every 8 Hours PRN          pantoprazole  (PROTONIX) EC tablet 40 mg  2 Times Daily Before Meals          melatonin tablet 3 mg  Nightly          vitamin D (ERGOCALCIFEROL) capsule 50,000 Units  Every 7 Days             Vitamin D next dose Thursday Sept 5, weekly x 3 doses.    Diet-consistent carb, regular with thins, no straws. She continues to need 1:1 supervision for meals  . She can take meds crushed in puree    Blood glucose check q AC and HS    >30 on discharge  Recheck CMP-liver enzymes and lipid panel on statin-atorvastatin the first week of October.  Recheck CBC every 2 weeks and BMP every 2 weeks.    >30 on discharge    Electronically signed by Ajit Howard MD at 8/29/2019 11:41 AM

## 2019-08-30 NOTE — PAYOR COMM NOTE
"*Please note that system character limits require multiple faxes - this one contains MD progress notes*    Good morning!    AUTH # [id number]    Please consider the included clinical documentation as an appeal for coverage for acute rehab on August 27th and 28th. The patient was increasingly refusing treatment, becoming agitated and aggressive with staff, and was transferred to the behavioral health unit yesterday, 8/29. If you have any questions please do not hesitate to call.     Star Capps, RN  [phone]    Darby Workman (74 y.o. Female)     Date of Birth Social Security Number Address Home Phone MRN    1945  [id number] [address] [phone] [id number]    Mormonism Marital Status          Adventism        Admission Date Admission Type Admitting Provider Attending Provider Department, Room/Bed    7/31/19 Elective Daniel Howard MD  [facility], [facility]    Discharge Date Discharge Disposition Discharge Destination        8/29/2019 Home or Self Care              Attending Provider:  (none)   Allergies:  No Known Allergies    Isolation:  Contact   Infection:  ESBL E coli (08/20/19)   Code Status:  CPR    Ht:  165.1 cm (65\")   Wt:  60.9 kg (134 lb 3.2 oz)    Admission Cmt:  None   Principal Problem:  None                Active Insurance as of 7/31/2019     Primary Coverage     Payor Plan Insurance Group Employer/Plan Group    [facility] [id number]     Payor Plan Address Payor Plan Phone Number Payor Plan Fax Number Effective Dates    [address]   4/1/2018 - None Entered    [place], [place] [address]       Subscriber Name Subscriber Birth Date Member ID       DANIEL WORKMAN 2/6/1946 [id number]           Secondary Coverage     Payor Plan Insurance Group Employer/Plan Group    MEDICARE MEDICARE A ONLY      Payor Plan Address Payor Plan Phone Number Payor Plan Fax Number Effective Dates    [address] [phone]  " 10/1/2010 - None Entered    [facility] [id number]       Subscriber Name Subscriber Birth Date Member ID       NOMAN TEIXEIRA 1945 [id number]                 Emergency Contacts      (Rel.) Home Phone Work Phone Mobile Phone    eder teixeira (Daughter) [phone]    Ajit Teixeira (Spouse) [phone] -- --               Physician Progress Notes (last 7 days) (Notes from 8/23/2019  8:08 AM through 8/30/2019  8:08 AM)      Ajit Howard MD at 8/29/2019 11:08 AM             LOS: 29 days   Patient Care Team:  Eric Matta MD as PCP - General (General Practice)    Chief Complaint:     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  She is unable to identify any focal complaint.  On discussion with staff her strength bilaterally appears the same.   She continues with restlessness, and has been more resistant to care.  She is been pushing staff away or hanging onto staff.  Not taking her medications consistently.  Did not eat breakfast.  She will put her foot on the door frame when staff is trying to move out of the room in her wheelchair.  She received Zyprexa 5 mg IM on this morning without any change in her mood/behavior.  Patient has been reviewed by the psychiatric service and plan is to transfer to the CMU unit for further assessment and management with adjustment in her medications to help with her mood/behavior.  Discussed with the patient's  and he is in agreement with this plan.  She will  transferred to the CMU today.       History taken from: patient chart RN    Objective     Vital Signs  Resp:  [16] 16  BP: (105-158)/(56-70) 158/70    Objective  Physical Exam    MENTAL STATUS -  AWAKE / ALERT.    Presently with sitter.  Holding onto the centers arm.  HEENT- NCAT,   SCLERA NON-ICTERIC, CONJUNCTIVA PINK, OP MOIST, NO JVD   LUNGS - normal respirations.  Clear to auscultation  HEART- RRR   ABD -   non-distended  EXT - NO EDEMA OR CYANOSIS  NEURO -alert.  Aphasia-unchanged.  No volitional verbalization during the visit.  On questioning, did not appear to have any headache.  MOTOR EXAM -grossly unchanged but did not participate with manual muscle testing         Results Review:     I reviewed the patient's new clinical results.     CT HEAD - AUGUST 11, 2019  FINDINGS:  Old appearing lacunar type infarcts are again noted about the  right thalamus and basal ganglia regions. There are stable areas of  encephalomalacia in the left parietal and occipital lobes medially.  Generalized atrophy. There are moderately extensive scattered areas of  decreased density in the white matter likely related to chronic ischemic  gliotic changes.    No hydrocephalus.    Glucose   Date/Time Value Ref Range Status   08/29/2019 0728 153 (H) 70 - 130 mg/dL Final   08/28/2019 1057 208 (H) 70 - 130 mg/dL Final   08/28/2019 0704 92 70 - 130 mg/dL Final   08/27/2019 2044 194 (H) 70 - 130 mg/dL Final   08/27/2019 1631 77 70 - 130 mg/dL Final   08/27/2019 1617 64 (L) 70 - 130 mg/dL Final   08/27/2019 1113 138 (H) 70 - 130 mg/dL Final   08/27/2019 0726 125 70 - 130 mg/dL Final     Results from last 7 days   Lab Units 08/28/19  0711 08/26/19  0658 08/23/19  0631   WBC 10*3/mm3 5.57 5.67 4.21   HEMOGLOBIN g/dL 9.7* 9.7* 9.5*   HEMATOCRIT % 30.5* 31.1* 30.6*   PLATELETS 10*3/mm3 254 270 306       Ref. Range 5/22/2019 05:44 5/22/2019 11:34 7/9/2019 08:25 7/18/2019 04:49 7/19/2019 05:05 7/23/2019 05:56 8/1/2019 06:48   Hemoglobin Latest Ref  Range: 12.0 - 15.9 g/dL 10.8 (L)  10.6 (L) 8.5 (L) 9.7 (L) 9.3 (L) 7.4 (L)   Hematocrit Latest Ref Range: 34.0 - 46.6 % 35.1  33.4 (L) 26.2 (L) 29.9 (L) 28.6 (L) 23.6 (L)     Results from last 7 days   Lab Units 08/28/19  0711 08/26/19  0658 08/23/19  0631   SODIUM mmol/L 139 141 137   POTASSIUM mmol/L 3.7 3.6 3.6   CHLORIDE mmol/L 102 104 101   CO2 mmol/L 27.9 27.0 28.4   BUN mg/dL 20 20 17   CREATININE mg/dL 0.85 0.72 0.73   CALCIUM mg/dL 9.1 8.5* 9.1   BILIRUBIN mg/dL 0.3  --   --    ALK PHOS U/L 88  --   --    ALT (SGPT) U/L 13  --   --    AST (SGOT) U/L 19  --   --    GLUCOSE mg/dL 76 142* 108*      Results for NOMAN TEIXEIRA (MRN [id number]) as of 8/28/2019 10:12   Ref. Range 8/1/2019 06:47   Alkaline Phosphatase Latest Ref Range: 39 - 117 U/L 107   Total Protein Latest Ref Range: 6.0 - 8.5 g/dL 5.2 (L)   ALT (SGPT) Latest Ref Range: 1 - 33 U/L 31   AST (SGOT) Latest Ref Range: 1 - 32 U/L 32   Total Bilirubin Latest Ref Range: 0.2 - 1.2 mg/dL 0.3   Albumin Latest Ref Range: 3.50 - 5.20 g/dL 2.90 (L)      Ref. Range 8/1/2019 06:47   25 Hydroxy, Vitamin D Latest Ref Range: 30.0 - 100.0 ng/ml 21.8 (L)       Ref. Range 8/1/2019 06:47   Total Cholesterol Latest Ref Range: 0 - 200 mg/dL 105   HDL Cholesterol Latest Ref Range: 40 - 60 mg/dL 40   LDL Cholesterol  Latest Ref Range: 0 - 100 mg/dL 57   VLDL Cholesterol Latest Ref Range: 5 - 40 mg/dL 8   Triglycerides Latest Ref Range: 0 - 150 mg/dL 40   Medication Review: done  Scheduled Meds:    amLODIPine 5 mg Oral BID - RT   apixaban 5 mg Oral Q12H   aspirin 325 mg Oral Daily   atorvastatin 80 mg Oral Nightly   brimonidine 1 drop Both Eyes BID   dorzolamide 1 drop Both Eyes BID   glipiZIDE 5 mg Oral BID AC   hydrALAZINE 50 mg Oral Q8H   insulin lispro 0-7 Units Subcutaneous 4x Daily With Meals & Nightly   losartan-HCTZ (HYZAAR) 100-12.5 combo dose  Oral Daily   melatonin 3 mg Oral Nightly   metFORMIN 1,000 mg Oral BID With Meals   nebivolol 20 mg Oral BID    nystatin 5 mL Swish & Spit 4x Daily   pantoprazole 40 mg Oral BID AC   PARoxetine 10 mg Oral Daily   potassium chloride 20 mEq Oral Daily With Breakfast   vitamin D 50,000 Units Oral Q7 Days     Continuous Infusions:   PRN Meds:.•  acetaminophen  •  aluminum-magnesium hydroxide-simethicone  •  dextrose  •  dextrose  •  glucagon (human recombinant)  •  nitroglycerin  •  OLANZapine      Assessment/Plan       Stroke (cerebrum) (CMS/HCC)      Assessment & Plan  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  If add Namenda, will probably discontinue amantadine as she continues alert.  August 11-she has some increased irritability at times.  This may be related to the underlying stroke deficits itself.  She does not appear to have any dysuria, no odor to her urine.  Staff reports that for the most part she is eating okay.  Will check a follow-up CT of the head to assess for any interval visual changes.  She is on aspirin and Eliquis for stroke prophylaxis.  She had noticeably improved alertness when amantadine was started.  She is readily alert now.  This may be related to natural recovery in terms of her level of arousal at this point.  Current plan is to discontinue the amantadine to see if that helps with her irritability and start  on Namenda for aphasia.  August 12 -  Patient with increased restlessness at times.   Continues aphasia. Not able to identify any complaint.  Appears anxious today. No change on CT head yesterday. Started on Namenda for aphasia on 8/12/19.   August 13- will continue to monitor on recent med adjustments   August 15-She continues with expressive language deficits.    Again appears anxious related to her aphasia and difficulty expressing herself.    She will be able to get occasional single word for the examiner.   She does follow instructions.  The patient was reviewed with .  Discussed adding on an antidepressant with anxiolytic properties -Paxil-as it appears frustration from her aphasia with associated anxiety with the frustration affects her performance.  We will plan on starting Paxil 10 mg daily tomorrow and monitor response.  Reviewed with the patient's  that would not add a short acting anxiolytic (such as a benzodiazepine or Seroquel) as it may affect her cognitive performance.    August 16-started Paxil 10 mg daily  August 19 - Increased restlessness Friday night - ? Related to UTI vs initiation of Paxil. On Cefdinir for GNR pending ID &Sensitivity.  August 20 -urine culture with E. coli ESBL.  Given her aphasia not able to obtain information regarding dysuria.  As the urinalysis was ordered as part of work-up for increased restlessness this weekend, would have to treat as symptomatic although could be asymptomatic bacteriuria.  Will place on ertapenem 1 g IV daily for 3-5 days.  August 21 - increased restlessness/anxiety today. Labs without any acute change. Blood glucose okay this AM. BP pattern okay. No gross motor changes. Follow on current regimen for now.   Addendum-Patient continued today with  increased restlessness/anxiety/refusing aspects of nursing care. She had had a good day in therapies yesterday. She is calmer now with family present. Did allow IV antibiotic to be infused but has not taken PO meds or eaten dinner yet.   Discussed with patient's /children will need to hold on planned discharge tomorrow given her mood/behavior today.   I do not feel Namenda (started as trial for aphasia) is related as had episodes of restlessness prior to starting, but will discontinue for now so  slate with psychoactive medications. Continue Paxil for now. Will consult Psychiatry for input.  August 22-patient more cooperative today, taking medications and will participate in therapies.   "Still perseverates on \"no\".  Still with some anxiety related to her aphasia but less than yesterday.  Seen by psychiatry and to continue with Paxil 10 mg daily.  Also order written for Zyprexa 5 mg IM every 8 hours as needed agitation.  To observe on current regimen.  August 26 - still gets anxious/frustrated by aphasia. Not participate with therapies or eat meal at times. Continues on Paxil. Will ask Psychiatry for any additional thoughts in AM.   August 27-patient received Zyprexa 5 mg IM at about 830 last evening.  Slept last night.  She is not anxious this morning.  Participating with activities.  Will plan to get updated assessment from psychiatry for any adjustment in regimen before discharge to subacute - per facility cannot go on a prn medication. Addendum: per Psychiatry,  recommend giving Paxil opportunity to exert its clinical effect over the next 1-1/2 to 2 weeks.  August 28-she did better last evening per nursing.  Reported to sleep well.  She is received Zyprexa as needed only once in the past 6 days. Patient reviewed with Psychiatry.     Will place on melatonin 3 mg at nighttime scheduled for sleep.  As patient's overall status appears stable and not making any new interventions at this time, allowing Paxil and opportunity to take effect, [facility], but no bed available until the end of the week.  She will need follow-up assessments by the physicians at that facility to adjust her medication - Paxil - as needed.  August 29-patient was more restless last evening, required multiple staff members in the room. She continues with restlessness, and has been more resistant to care.  She is been pushing staff away or hanging onto staff.  Not taking her medications consistently.  Did not eat breakfast.  She will put her foot on the door frame when staff is trying to move out of the room in her wheelchair.  She received Zyprexa 5 mg IM on this morning without any change in her " mood/behavior.  Patient has been reviewed by the psychiatric service and plan is to transfer to the CMU unit for further assessment and management with adjustment in her medications to help with her mood/behavior.  Discussed with the patient's  and he is in agreement with this plan.  She will transferred to the CMU today.    Aphasia -  August 12 - trial of Namenda  August 16-continue to observe on Namenda 5 mg daily for her aphasia and may possibly titrate up next week  August 20-titrate up on Namenda to 5 mg twice a day  August 22-discontinued Namenda.  Do not feel related to her anxiety/restlessness as it was present prior to starting but discontinued to avoid any additional psychoactive medications that might add to it.  Had not seen any improvement in her aphasia with the very brief trial.    Dysphagia-Cortrak feeding tube discontinued on July 31 and started on puréed/honey thick liquid diet with strategies  August 7-advance to fork mash nectar thick liquid diet, consistent carbohydrate.  August 14 - repeat VFSS to assess for advancing to thin liquids  August 15-Video fluoroscopic swallow study today-mechanical soft, no mixed consistency, nectar thick liquid, water protocol between meals, no straws. May take meds whole in Puree or NTL as tolerated. May have ice chips or sips of water in between meals after oral care per protocol.  August 28 - Pt is tolerating trials of regular with thins by cup.   - upgrade to regular with thins, no straws. She continues to need 1:1 supervision for meals  . She can take meds crushed in puree.    History of multiple bilateral strokes and left central retinal artery occlusion    History of left greater than right internal carotid artery stenosis-status post left internal carotid artery stent July 17, 2019  Follow with Neurosurgery in office in October with carotid ultrasound    Stroke prophylaxis-aspirin/Eliquis/atorvastatin 80 mg  August 28-recheck liver enzymes and lipid  panel today and recheck again in 1 month first of October    History of hypertension -  Hyzaar 100-25. August 4 - Bystolic increased to 20 mg daily to 20 mg bid.  August 8 - BP still runs high. On discharge in May 2019 was on Hyzaar 100-25 mg daily, Bystolic 20 mg daily, amlodipine 10 mg daily, Hydralazine 50 mg q 8 hours.  Will add Hydralazine initially 10 mg po q 8 hours and titrate up as needed.   August 9-systolic blood pressure elevated last night and early this morning but better this afternoon at 122-130.  Continue to follow recent addition of hydralazine and titrate up as needed  August 10-systolic blood pressure 175 this afternoon, range otherwise 122-142.  Will titrate up on hydralazine to 20 mg every 8 hours.  August 11-hypertension overall appears improved.  Will titrate up further to 25 mg every 8 hours.  August 12 - add amlodipine 5 mg daily.   August 14 - titrate up on hydralazine to 50 mg q 8 hours  August 15-blood pressure improved this afternoon with systolic blood pressure in the 120s-follow on current regimen  August 16-blood pressure range 110////59-will continue to monitor on present regimen  August 19 - /74 early AM, later 136/71 - increase amlodipine to 5 mg bid  August 28-blood pressure range 112-140/60-68    History of diabetes mellitus -Lantus/glipizide (home medication metformin 1000 mg twice daily and glipizide 10 mg twice daily)  August 1-blood sugars were low yesterday afternoon after tube feeds discontinued.  Held glipizide last night and this morning and Lantus last night.  Blood sugar elevated at noontime today.  Will receive resume glipizide at a lower dose of 5 mg twice a day with meals.  Continue sliding scale insulin coverage.  She also is on metformin at home.  August 5-add back metformin initially at 500 mg twice a day and continue glipizide 5 mg twice a day, both one half of previous home dose, titrate up as needed.  August 7-titrate up metformin to  850 mg twice a day.  Add consistent carbohydrate restriction to diet.  August 9-increase metformin to 1000 g twice a day.  August 10-blood glucose 340 last evening but 150-114-178 so far today  August 11-continue to follow blood sugar pattern and may increase glipizide further as current dose glipizide 5 mg twice a day along with metformin 1000 mg twice a day  August 14 - blood glucose  past 24 hours - monitor pattern.  August 15-blood glucose [phone]--855-66-continue to follow pattern.  Will change sliding scale insulin coverage to Humalog at a lower dose.  She was started on the regular insulin sliding scale when she was on tube feeds.  August 16 - recent blood glucose -349-138  August 19 - recent blood glucose [phone] - increase glipizide to 7.5 mg bid  August 21 - refused blood glucose check today. Will decrease glipizide back to 5 mg bid to avoid potential for hypoglycemia until allow blood glucose check.   August 28-recent blood glucose checks [phone]-/92 this morning.  Continues on metformin thousand milligrams twice a day and glipizide 5 mg twice a day.  The one low blood sugar yesterday afternoon is not typical and will not make any adjustments today in her regimen but would continue to follow and possibly decrease morning glipizide dose if needed.        Anemia-August 1-hemoglobin 7.4.  Most recent check on July 23 HGB 9.3.  Will recheck hemoglobin this afternoon.  Hemoccult stool.  Iron studies.  Reticulocyte count.  Recheck CBC in the a.m.  Added Protonix.  Patient is on aspirin and Eliquis.  With recent stroke  will look to transfuse packed red blood cell.  August 2-hemoglobin improved 9.0-1 unit packed red blood cells.  Elevated reticulocyte count in response to anemia.  Nursing describes dark stool.  Patient discussed with gastroenterology.  At this point unable to do endoscopy as on Eliquis and aspirin.  Will treat symptomatically for now with increasing proton  pump inhibitor and transfusing as needed.  August 5-anemia improved-hemoglobin 9.8  August 16-hemoglobin unchanged 9.8    DVT prophylaxis-SCDs/anticoagulation    Impulsivity-   Nursing to do re-orientation with the patient.  Room close to the nursing station.    Endocrine-vitamin D deficiency-ergocalciferol 50,000 units weekly x8 weeks added. Vitamin B12 level and TSH checked in late May unremarkable    Urinary tract infection-August 20 -urine culture with E. coli ESBL.  Given her aphasia not able to obtain information regarding dysuria.  As the urinalysis was ordered as part of work-up for increased restlessness this weekend, would have to treat as symptomatic although could be asymptomatic bacteriuria.  Placed on ertapenem 1 g IV daily for 3  days.     Impaired cognition/impaired language, impaired swallow, impaired activity daily living, impaired mobility     REHAB- admit for comprehensive acute inpatient rehabilitation .  This would be an interdisciplinary program with physical therapy 1 hour,  occupational therapy 1 hour, and speech therapy 1 hour, 5 days a week.  Rehabilitation nursing for carryover, monitoring of cardiopulmonary and neurologic   status, bowel and bladder, and skin  Ongoing physician follow-up.  Weekly team conferences.  Goals are indeterminant.   Rehabilitation prognosis determined.  Medical prognosis determined.  Estimated length of stay is indeterminate.    TEAM CONF - AUGUST 6- TRANFERS CTG. GAIT UP  FEET CTG-MIN ASSIST. UBD MIN. LBD MOD. BATH MOD. SEVERE APHASIA. INCREASED RESISTANCE TO PARTICIPATE AT TIMES.   PUREE/HTL.  GOOD PO INTAKE. ADJUSTING MEDS FOR DIABETES MELLITUS.  BLADDER CONTINENT/INCONTIENT.   ELOS- 2 WEEKS.     TEAM CONF - AUGUST 13 - DID GREAT WITH PT ON Saturday, FOLLOWING COMMANDS AND BATTING A BALL WITH ANOTHER PATIENT. YESTERDAY, VERY FRUSTRATED AND IRRITABLE.  FRUSTRATED BY APHASIA, TRYING TO TELL STAFF SOMETHING. WILL HOLD ON TO OBJECTS, REPEAT SINGLE WORD.    BED CTG. 4 STAIRS CTG.  GAIT 220 FEET CTG-SBA NO DEVICE.  TOILET TRANSFERS CTG-MIN.  GROOMING MIN.  UBD MIN ASSIST FOR BRA, LBD CTG-MIN. BATH CTG-MIN. COORDINATION RUE BETTER.  DYSPHAGIA - IMPROVED - [place]Y CASAS. DO NOT FEEL SHE WOULD TOLERATE E-STIM. RECEPTIVE LANGUAGE IMPROVED SLIGHTLY , POINTING TO OBJECTS. EXPRESSIVELY PERSEVERATIVE ON A SINGLE WORD, TRIES TO USE PICTURE BOARD TO COMMUNICATE. YES/NO NOT ACCURATE.  CONTINENT BOWEL AND BLADDER, TIMED VOIDS. SKIN INTACT. TYPICALLY GOOD INTAKE.  ELOS - 1.5 WEEKS    AUGUST 14 - In therapies - In OT, increased participation noted with  present   Transfers SBA/CTG  Walked from therapy gym to room without AD SBA and vc's for directions back to the room   300' outdoors on sidewalk, incline; 300', 180', 100' up on rehab unit. Pt was able to find her room when she got close to it  Bathing, dressing, grooming min assist. Toileting moderate assist.  Pt participated in using R hand to manipualte round pegs into peg board by color with MIN difficutly once pt caught on to the task. with 3D block recreation with R hand with 100% color match, 80% accuracy with block recreation  With SLP, patient was not able to effectively communicate her wants/needs but refused to go to therapy office by planting her heels while rolling in wc down the [place]; tx session completed in her room     August 15-The patient was reviewed with .  Discussed adding on an antidepressant with anxiolytic properties -Paxil-as it appears frustration from her aphasia with associated anxiety with the frustration affects her performance.  We will plan on starting Paxil 10 mg daily tomorrow and monitor response.  Reviewed with the patient's  that would not add a short acting anxiolytic (such as a benzodiazepine or Seroquel) as it may affect her cognitive performance.    August 16-Patient was reviewed with her daughter today including rehabilitation goals, discharge planning, and recent  medication adjustment to try to help with her anxiety/frustration with her aphasia.  Reviewed with the daughter that on discussion with the team is felt that she would do better with her functional status/aphasia/anxiety if able to be discharged to home environment with more frequent interactions but if that is not feasible with the need to look at subacute options.    TEAM CONF - AUGUST 20 - GAIT CTG- FEET. NO DEVICE, WALKS TO AND FROM GYM.  ADLS CTG WITH CUING.  PLAYED ENTIRE GAME OF CHECKERS YESTERDAY. VARIABLE PERFORMANCE WITH SPEECH THERAPY 20-90% MATCHING WORDS TO PICTURES.    MECH SOFT, GROUND MEAT, NECTAR THICK LIQUIDS.   TYPICALLY CONTINENT BLADDER BUT INCONTINENT BOWEL. DID FINE LAST EVENING PER SISTER AND DID NOT TRY TO GET UP- WILL DISCONTINUE SITTER. IN ROOM CLOSE TO NURSING STATION.   BEST- FAMILY JEFFREY LAST WEEK- WILL WORK TOWARD SUBACUTE PLACEMENT     August 23-upper body dressing set up, lower body dressing min assist.  Grooming supervision.  Transfers standby assist.  Ambulated 220 feet without a device contact-guard standby assist.  She spent in therapy this morning but not with physical therapy this afternoon    TEAM JEFFREY - AUGUST 27 - CONTINUES WITH APHASIA. WORKED ON MELODIC INTONATION. BED SBA. 4 STAIRS CTG. GAIT 300 FEET SBA. TOILET TRANSFERS SBA. SHOWER TRANSFERS SBA. VOIDING ON OWN. UBD MIN ASSIST. LBD CTG-MIN. BATH CTG. DIET - MECH SOFT, GROUND MEATS, NECTAR THICK LIQUIDS. NOT ABLE TO MANAGE AT HOME IN SHORT TERM. WILL ASK PSYCHIATRY SEE IN FOLLOW UP FOR ANY FINAL ADJUSTMENTS IN REGIMEN BEFORE DISCHARGE TO SUBACUTE.  .  ELOS- DISPOSITION PENDING.      .  August 29-patient was more restless last evening, required multiple staff members in the room. She continues with restlessness, and has been more resistant to care.  She is been pushing staff away or hanging onto staff.  Not taking her medications consistently.  Did not eat breakfast.  She will put her foot on the door frame when  staff is trying to move out of the room in her wheelchair.  She received Zyprexa 5 mg IM on this morning without any change in her mood/behavior.  Patient has been reviewed by the psychiatric service and plan is to transfer to the CMU unit for further assessment and management with adjustment in her medications to help with her mood/behavior.  Discussed with the patient's  and he is in agreement with this plan.  She will transferred to the CMU today.       Barrier to home discharge includes  Impaired cognitive-linguistic skills - work on receptive and expressive language and cognition.       Ajit Howard MD  08/29/19  11:08 AM    Time:               Electronically signed by Ajit Howard MD at 8/29/2019 11:20 AM     Ajit Howard MD at 8/28/2019 11:07 AM        Bed not available until Friday at [facility].    Electronically signed by Ajit Howard MD at 8/28/2019 11:07 AM     Ajit Howard MD at 8/28/2019 10:13 AM             LOS: 28 days   Patient Care Team:  Eric Matta MD as PCP - General (General Practice)    Chief Complaint:     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  Nursing reports that she had a good night last night.  She did better with speech therapy today and is advanced to regular solid thin liquid diet.  With OT she was noted to be upset while eating breakfast but not able to tell why.  Later in the morning resting in bed without any new apparent complaint.  She is able to  "eat seated in the wheelchair with her right hand using a spoon and fork with minimal spilling.  Still with difficulty following instructions completely due to do her aphasia.     History taken from: patient chart RN    Objective     Vital Signs  Temp:  [97.8 °F (36.6 °C)-98.1 °F (36.7 °C)] 97.8 °F (36.6 °C)  Heart Rate:  [66-70] 68  Resp:  [16] 16  BP: (112-140)/(60-68) 133/67    Objective  Physical Exam    MENTAL STATUS -  AWAKE / ALERT.  NOT ANXIOUS PRESENTLY. SMILING.  HEENT- NCAT,   SCLERA NON-ICTERIC, CONJUNCTIVA PINK, OP MOIST, NO JVD   LUNGS - normal respirations.  Clear to auscultation  HEART- RRR   ABD -   non-distended  EXT - NO EDEMA OR CYANOSIS  NEURO -alert.  Aphasia.  She will get occasional single word such as 'yes\" or \"no\" out. Follows instructions.    MOTOR EXAM -takes resistance bilaterally.         Results Review:     I reviewed the patient's new clinical results.     CT HEAD - AUGUST 11, 2019  FINDINGS:  Old appearing lacunar type infarcts are again noted about the  right thalamus and basal ganglia regions. There are stable areas of  encephalomalacia in the left parietal and occipital lobes medially.  Generalized atrophy. There are moderately extensive scattered areas of  decreased density in the white matter likely related to chronic ischemic  gliotic changes.    No hydrocephalus.    Glucose   Date/Time Value Ref Range Status   08/28/2019 0704 92 70 - 130 mg/dL Final   08/27/2019 2044 194 (H) 70 - 130 mg/dL Final   08/27/2019 1631 77 70 - 130 mg/dL Final   08/27/2019 1617 64 (L) 70 - 130 mg/dL Final   08/27/2019 1113 138 (H) 70 - 130 mg/dL Final   08/27/2019 0726 125 70 - 130 mg/dL Final   08/26/2019 1104 131 (H) 70 - 130 mg/dL Final   08/26/2019 0658 147 (H) 70 - 130 mg/dL Final     Results from last 7 days   Lab Units 08/28/19  0711 08/26/19  0658 08/23/19  0631   WBC 10*3/mm3 5.57 5.67 4.21   HEMOGLOBIN g/dL 9.7* 9.7* 9.5*   HEMATOCRIT % 30.5* 31.1* 30.6*   PLATELETS 10*3/mm3 254 270 306    "    Ref. Range 5/22/2019 05:44 5/22/2019 11:34 7/9/2019 08:25 7/18/2019 04:49 7/19/2019 05:05 7/23/2019 05:56 8/1/2019 06:48   Hemoglobin Latest Ref Range: 12.0 - 15.9 g/dL 10.8 (L)  10.6 (L) 8.5 (L) 9.7 (L) 9.3 (L) 7.4 (L)   Hematocrit Latest Ref Range: 34.0 - 46.6 % 35.1  33.4 (L) 26.2 (L) 29.9 (L) 28.6 (L) 23.6 (L)     Results from last 7 days   Lab Units 08/28/19  0711 08/26/19  0658 08/23/19  0631   SODIUM mmol/L 139 141 137   POTASSIUM mmol/L 3.7 3.6 3.6   CHLORIDE mmol/L 102 104 101   CO2 mmol/L 27.9 27.0 28.4   BUN mg/dL 20 20 17   CREATININE mg/dL 0.85 0.72 0.73   CALCIUM mg/dL 9.1 8.5* 9.1   GLUCOSE mg/dL 76 142* 108*      Results for NOMAN TEIXEIRA (MRN [id number]) as of 8/28/2019 10:12   Ref. Range 8/1/2019 06:47   Alkaline Phosphatase Latest Ref Range: 39 - 117 U/L 107   Total Protein Latest Ref Range: 6.0 - 8.5 g/dL 5.2 (L)   ALT (SGPT) Latest Ref Range: 1 - 33 U/L 31   AST (SGOT) Latest Ref Range: 1 - 32 U/L 32   Total Bilirubin Latest Ref Range: 0.2 - 1.2 mg/dL 0.3   Albumin Latest Ref Range: 3.50 - 5.20 g/dL 2.90 (L)      Ref. Range 8/1/2019 06:47   25 Hydroxy, Vitamin D Latest Ref Range: 30.0 - 100.0 ng/ml 21.8 (L)       Ref. Range 8/1/2019 06:47   Total Cholesterol Latest Ref Range: 0 - 200 mg/dL 105   HDL Cholesterol Latest Ref Range: 40 - 60 mg/dL 40   LDL Cholesterol  Latest Ref Range: 0 - 100 mg/dL 57   VLDL Cholesterol Latest Ref Range: 5 - 40 mg/dL 8   Triglycerides Latest Ref Range: 0 - 150 mg/dL 40   Medication Review: done  Scheduled Meds:    amLODIPine 5 mg Oral BID - RT   apixaban 5 mg Oral Q12H   aspirin 325 mg Oral Daily   atorvastatin 80 mg Oral Nightly   brimonidine 1 drop Both Eyes BID   dorzolamide 1 drop Both Eyes BID   glipiZIDE 5 mg Oral BID AC   hydrALAZINE 50 mg Oral Q8H   insulin lispro 0-7 Units Subcutaneous 4x Daily With Meals & Nightly   lansoprazole 30 mg Oral BID AC   losartan-HCTZ (HYZAAR) 100-12.5 combo dose  Oral Daily   metFORMIN 1,000 mg Oral BID With Meals    nebivolol 20 mg Oral BID   nystatin 5 mL Swish & Spit 4x Daily   PARoxetine 10 mg Oral Daily   potassium chloride 20 mEq Oral Daily With Breakfast   vitamin D 50,000 Units Oral Q7 Days     Continuous Infusions:   PRN Meds:.•  acetaminophen  •  aluminum-magnesium hydroxide-simethicone  •  dextrose  •  dextrose  •  glucagon (human recombinant)  •  nitroglycerin  •  OLANZapine      Assessment/Plan       Stroke (cerebrum) (CMS/HCC)      Assessment & Plan  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  If add Namenda, will probably discontinue amantadine as she continues alert.  August 11-she has some increased irritability at times.  This may be related to the underlying stroke deficits itself.  She does not appear to have any dysuria, no odor to her urine.  Staff reports that for the most part she is eating okay.  Will check a follow-up CT of the head to assess for any interval visual changes.  She is on aspirin and Eliquis for stroke prophylaxis.  She had noticeably improved alertness when amantadine was started.  She is readily alert now.  This may be related to natural recovery in terms of her level of arousal at this point.  Current plan is to discontinue the amantadine to see if that helps with her irritability and start  on Namenda for aphasia.  August 12 -  Patient with increased restlessness at times.   Continues aphasia. Not able to identify any complaint.  Appears anxious today. No change on CT head yesterday. Started on Namenda for aphasia on 8/12/19.   August 13- will continue to monitor on recent med adjustments   August 15-She continues with expressive language deficits.    Again appears anxious related to her aphasia and difficulty expressing herself.    She will be able to get occasional single word for the examiner.  She  does follow instructions.  The patient was reviewed with .  Discussed adding on an antidepressant with anxiolytic properties -Paxil-as it appears frustration from her aphasia with associated anxiety with the frustration affects her performance.  We will plan on starting Paxil 10 mg daily tomorrow and monitor response.  Reviewed with the patient's  that would not add a short acting anxiolytic (such as a benzodiazepine or Seroquel) as it may affect her cognitive performance.    August 16-started Paxil 10 mg daily  August 19 - Increased restlessness Friday night - ? Related to UTI vs initiation of Paxil. On Cefdinir for GNR pending ID &Sensitivity.  August 20 -urine culture with E. coli ESBL.  Given her aphasia not able to obtain information regarding dysuria.  As the urinalysis was ordered as part of work-up for increased restlessness this weekend, would have to treat as symptomatic although could be asymptomatic bacteriuria.  Will place on ertapenem 1 g IV daily for 3-5 days.  August 21 - increased restlessness/anxiety today. Labs without any acute change. Blood glucose okay this AM. BP pattern okay. No gross motor changes. Follow on current regimen for now.   Addendum-Patient continued today with  increased restlessness/anxiety/refusing aspects of nursing care. She had had a good day in therapies yesterday. She is calmer now with family present. Did allow IV antibiotic to be infused but has not taken PO meds or eaten dinner yet.   Discussed with patient's /children will need to hold on planned discharge tomorrow given her mood/behavior today.   I do not feel Namenda (started as trial for aphasia) is related as had episodes of restlessness prior to starting, but will discontinue for now so  slate with psychoactive medications. Continue Paxil for now. Will consult Psychiatry for input.  August 22-patient more cooperative today, taking medications and will participate in therapies.  Still  "perseverates on \"no\".  Still with some anxiety related to her aphasia but less than yesterday.  Seen by psychiatry and to continue with Paxil 10 mg daily.  Also order written for Zyprexa 5 mg IM every 8 hours as needed agitation.  To observe on current regimen.  August 26 - still gets anxious/frustrated by aphasia. Not participate with therapies or eat meal at times. Continues on Paxil. Will ask Psychiatry for any additional thoughts in AM.   August 27-patient received Zyprexa 5 mg IM at about 830 last evening.  Slept last night.  She is not anxious this morning.  Participating with activities.  Will plan to get updated assessment from psychiatry for any adjustment in regimen before discharge to subacute - per facility cannot go on a prn medication. Addendum: per Psychiatry,  recommend giving Paxil opportunity to exert its clinical effect over the next 1-1/2 to 2 weeks.  August 28-she did better last evening per nursing.  Reported to sleep well.  She is received Zyprexa as needed only once in the past 6 days. Patient reviewed with Psychiatry.  Will discontinue Zyprexa as needed.   Will place on melatonin 3 mg at nighttime scheduled for sleep.  As patient's overall status appears stable and not making any new interventions at this time, allowing Paxil and opportunity to take effect, will look at discharge to subacute [facility] today.  She will need follow-up assessments by the physicians at that facility to adjust her medication - Paxil - as needed.    Aphasia -  August 12 - trial of Namenda  August 16-continue to observe on Namenda 5 mg daily for her aphasia and may possibly titrate up next week  August 20-titrate up on Namenda to 5 mg twice a day  August 22-discontinued Namenda.  Do not feel related to her anxiety/restlessness as it was present prior to starting but discontinued to avoid any additional psychoactive medications that might add to it.  Had not seen any improvement in her aphasia with the " very brief trial.    Dysphagia-Cortrak feeding tube discontinued on July 31 and started on puréed/honey thick liquid diet with strategies  August 7-advance to fork mash nectar thick liquid diet, consistent carbohydrate.  August 14 - repeat VFSS to assess for advancing to thin liquids  August 15-Video fluoroscopic swallow study today-mechanical soft, no mixed consistency, nectar thick liquid, water protocol between meals, no straws. May take meds whole in Puree or NTL as tolerated. May have ice chips or sips of water in between meals after oral care per protocol.  August 28 - Pt is tolerating trials of regular with thins by cup.   - upgrade to regular with thins, no straws. She continues to need 1:1 supervision for meals  . She can take meds crushed in puree.    History of multiple bilateral strokes and left central retinal artery occlusion    History of left greater than right internal carotid artery stenosis-status post left internal carotid artery stent July 17, 2019  Follow with Neurosurgery in office in October with carotid ultrasound    Stroke prophylaxis-aspirin/Eliquis/atorvastatin 80 mg  August 28-recheck liver enzymes and lipid panel today and recheck again in 1 month first of October    History of hypertension -  Hyzaar 100-25. August 4 - Bystolic increased to 20 mg daily to 20 mg bid.  August 8 - BP still runs high. On discharge in May 2019 was on Hyzaar 100-25 mg daily, Bystolic 20 mg daily, amlodipine 10 mg daily, Hydralazine 50 mg q 8 hours.  Will add Hydralazine initially 10 mg po q 8 hours and titrate up as needed.   August 9-systolic blood pressure elevated last night and early this morning but better this afternoon at 122-130.  Continue to follow recent addition of hydralazine and titrate up as needed  August 10-systolic blood pressure 175 this afternoon, range otherwise 122-142.  Will titrate up on hydralazine to 20 mg every 8 hours.  August 11-hypertension overall appears improved.  Will titrate  up further to 25 mg every 8 hours.  August 12 - add amlodipine 5 mg daily.   August 14 - titrate up on hydralazine to 50 mg q 8 hours  August 15-blood pressure improved this afternoon with systolic blood pressure in the 120s-follow on current regimen  August 16-blood pressure range 110////59-will continue to monitor on present regimen  August 19 - /74 early AM, later 136/71 - increase amlodipine to 5 mg bid  August 28-blood pressure range 112-140/60-68    History of diabetes mellitus -Lantus/glipizide (home medication metformin 1000 mg twice daily and glipizide 10 mg twice daily)  August 1-blood sugars were low yesterday afternoon after tube feeds discontinued.  Held glipizide last night and this morning and Lantus last night.  Blood sugar elevated at noontime today.  Will receive resume glipizide at a lower dose of 5 mg twice a day with meals.  Continue sliding scale insulin coverage.  She also is on metformin at home.  August 5-add back metformin initially at 500 mg twice a day and continue glipizide 5 mg twice a day, both one half of previous home dose, titrate up as needed.  August 7-titrate up metformin to 850 mg twice a day.  Add consistent carbohydrate restriction to diet.  August 9-increase metformin to 1000 g twice a day.  August 10-blood glucose 340 last evening but 150-114-178 so far today  August 11-continue to follow blood sugar pattern and may increase glipizide further as current dose glipizide 5 mg twice a day along with metformin 1000 mg twice a day  August 14 - blood glucose  past 24 hours - monitor pattern.  August 15-blood glucose [phone]--332-66-continue to follow pattern.  Will change sliding scale insulin coverage to Humalog at a lower dose.  She was started on the regular insulin sliding scale when she was on tube feeds.  August 16 - recent blood glucose -044-138  August 19 - recent blood glucose [phone] - increase glipizide to 7.5 mg  bid  August 21 - refused blood glucose check today. Will decrease glipizide back to 5 mg bid to avoid potential for hypoglycemia until allow blood glucose check.   August 28-recent blood glucose checks [phone]/[phone] this morning.  Continues on metformin thousand milligrams twice a day and glipizide 5 mg twice a day.  The one low blood sugar yesterday afternoon is not typical and will not make any adjustments today in her regimen but would continue to follow and possibly decrease morning glipizide dose if needed.        Anemia-August 1-hemoglobin 7.4.  Most recent check on July 23 HGB 9.3.  Will recheck hemoglobin this afternoon.  Hemoccult stool.  Iron studies.  Reticulocyte count.  Recheck CBC in the a.m.  Added Protonix.  Patient is on aspirin and Eliquis.  With recent stroke  will look to transfuse packed red blood cell.  August 2-hemoglobin improved 9.0-1 unit packed red blood cells.  Elevated reticulocyte count in response to anemia.  Nursing describes dark stool.  Patient discussed with gastroenterology.  At this point unable to do endoscopy as on Eliquis and aspirin.  Will treat symptomatically for now with increasing proton pump inhibitor and transfusing as needed.  August 5-anemia improved-hemoglobin 9.8  August 16-hemoglobin unchanged 9.8    DVT prophylaxis-SCDs/anticoagulation    Impulsivity-   Nursing to do re-orientation with the patient.  Room close to the nursing station.    Endocrine-vitamin D deficiency-ergocalciferol 50,000 units weekly x8 weeks added. Vitamin B12 level and TSH checked in late May unremarkable    Urinary tract infection-August 20 -urine culture with E. coli ESBL.  Given her aphasia not able to obtain information regarding dysuria.  As the urinalysis was ordered as part of work-up for increased restlessness this weekend, would have to treat as symptomatic although could be asymptomatic bacteriuria.  Placed on ertapenem 1 g IV daily for 3  days.     Impaired cognition/impaired  language, impaired swallow, impaired activity daily living, impaired mobility     REHAB- admit for comprehensive acute inpatient rehabilitation .  This would be an interdisciplinary program with physical therapy 1 hour,  occupational therapy 1 hour, and speech therapy 1 hour, 5 days a week.  Rehabilitation nursing for carryover, monitoring of cardiopulmonary and neurologic   status, bowel and bladder, and skin  Ongoing physician follow-up.  Weekly team conferences.  Goals are indeterminant.   Rehabilitation prognosis determined.  Medical prognosis determined.  Estimated length of stay is indeterminate.    TEAM CONF - AUGUST 6- TRANFERS CTG. GAIT UP  FEET CTG-MIN ASSIST. UBD MIN. LBD MOD. BATH MOD. SEVERE APHASIA. INCREASED RESISTANCE TO PARTICIPATE AT TIMES.   PUREE/HTL.  GOOD PO INTAKE. ADJUSTING MEDS FOR DIABETES MELLITUS.  BLADDER CONTINENT/INCONTIENT.   ELOS- 2 WEEKS.     TEAM JEFFREY - AUGUST 13 - DID GREAT WITH PT ON Saturday, FOLLOWING COMMANDS AND BATTING A BALL WITH ANOTHER PATIENT. YESTERDAY, VERY FRUSTRATED AND IRRITABLE.  FRUSTRATED BY APHASIA, TRYING TO TELL STAFF SOMETHING. WILL HOLD ON TO OBJECTS, REPEAT SINGLE WORD.   BED CTG. 4 STAIRS CTG.  GAIT 220 FEET CTG-SBA NO DEVICE.  TOILET TRANSFERS CTG-MIN.  GROOMING MIN.  UBD MIN ASSIST FOR BRA, LBD CTG-MIN. BATH CTG-MIN. COORDINATION RUE BETTER.  DYSPHAGIA - IMPROVED - FORK TALA NTL. DO NOT FEEL SHE WOULD TOLERATE E-STIM. RECEPTIVE LANGUAGE IMPROVED SLIGHTLY , POINTING TO OBJECTS. EXPRESSIVELY PERSEVERATIVE ON A SINGLE WORD, TRIES TO USE PICTURE BOARD TO COMMUNICATE. YES/NO NOT ACCURATE.  CONTINENT BOWEL AND BLADDER, TIMED VOIDS. SKIN INTACT. TYPICALLY GOOD INTAKE.  ELOS - 1.5 WEEKS    AUGUST 14 - In therapies - In OT, increased participation noted with  present   Transfers SBA/CTG  Walked from therapy gym to room without AD SBA and vc's for directions back to the room   300' outdoors on sidewalk, incline; 300', 180', 100' up on rehab unit. Pt  was able to find her room when she got close to it  Bathing, dressing, grooming min assist. Toileting moderate assist.  Pt participated in using R hand to manipualte round pegs into peg board by color with MIN difficutly once pt caught on to the task. with 3D block recreation with R hand with 100% color match, 80% accuracy with block recreation  With SLP, patient was not able to effectively communicate her wants/needs but refused to go to therapy office by planting her heels while rolling in wc down the [place]; tx session completed in her room     August 15-The patient was reviewed with .  Discussed adding on an antidepressant with anxiolytic properties -Paxil-as it appears frustration from her aphasia with associated anxiety with the frustration affects her performance.  We will plan on starting Paxil 10 mg daily tomorrow and monitor response.  Reviewed with the patient's  that would not add a short acting anxiolytic (such as a benzodiazepine or Seroquel) as it may affect her cognitive performance.    August 16-Patient was reviewed with her daughter today including rehabilitation goals, discharge planning, and recent medication adjustment to try to help with her anxiety/frustration with her aphasia.  Reviewed with the daughter that on discussion with the team is felt that she would do better with her functional status/aphasia/anxiety if able to be discharged to home environment with more frequent interactions but if that is not feasible with the need to look at subacute options.    TEAM CONF - AUGUST 20 - GAIT CTG- FEET. NO DEVICE, WALKS TO AND FROM GYM.  ADLS CTG WITH CUING.  PLAYED ENTIRE GAME OF CHECKERS YESTERDAY. VARIABLE PERFORMANCE WITH SPEECH THERAPY 20-90% MATCHING WORDS TO PICTURES.    MECH SOFT, GROUND MEAT, NECTAR THICK LIQUIDS.   TYPICALLY CONTINENT BLADDER BUT INCONTINENT BOWEL. DID FINE LAST EVENING PER SISTER AND DID NOT TRY TO GET UP- WILL DISCONTINUE SITTER. IN ROOM CLOSE TO  NURSING STATION.   [facility]- FAMILY CONF LAST WEEK- WILL WORK TOWARD SUBACUTE PLACEMENT     August 23-upper body dressing set up, lower body dressing min assist.  Grooming supervision.  Transfers standby assist.  Ambulated 220 feet without a device contact-guard standby assist.  She spent in therapy this morning but not with physical therapy this afternoon    TEAM CONF - AUGUST 27 - CONTINUES WITH APHASIA. WORKED ON MELODIC INTONATION. BED SBA. 4 STAIRS CTG. GAIT 300 FEET SBA. TOILET TRANSFERS SBA. SHOWER TRANSFERS SBA. VOIDING ON OWN. UBD MIN ASSIST. LBD CTG-MIN. BATH CTG. DIET - MECH SOFT, GROUND MEATS, NECTAR THICK LIQUIDS. NOT ABLE TO MANAGE AT HOME IN SHORT TERM. WILL ASK PSYCHIATRY SEE IN FOLLOW UP FOR ANY FINAL ADJUSTMENTS IN REGIMEN BEFORE DISCHARGE TO SUBACUTE.  .  ELOS- DISPOSITION PENDING.     August 28 -  As patient's overall status appears stable and not making any new interventions at this time, allowing Paxil and opportunity to take effect, will look at discharge to subacute at [facility] today.  She will need follow-up assessments by the physicians at that facility to adjust her medication as needed.         Barrier to home discharge includes  Impaired cognitive-linguistic skills - work on receptive and expressive language and cognition.       Ajit Howard MD  08/28/19  10:13 AM    Time:      >35 minutes with > 50% face-to-face / floor time / coordination of care        Electronically signed by Ajit Howard MD at 8/28/2019  9:53 PM     Ajit Howard MD at 8/28/2019  9:39 AM        Pt is tolerating trials of regular with thins by cup.   - upgrade to regular with thins, no straws. She continues to need 1:1 supervision for meals  . She can take meds crushed in puree.    Electronically signed by Ajti Howard MD at 8/28/2019  9:39 AM     Nancy Ireland APRN at 8/27/2019  2:38 PM            [facility] PROGRESS NOTE    PATIENT  IDENTIFICATION:   Name:  Darby Workman      MRN:  [id number]     74 y.o.  female               CC: PO carotid endarterectomy      Subjective     Interval History: has no complaint of pain or HA.    Objective     Vital signs in last 24 hours:  Temp:  [96.4 °F (35.8 °C)-97.9 °F (36.6 °C)] 97.9 °F (36.6 °C)  Heart Rate:  [64-72] 66  Resp:  [16-20] 16  BP: (112-165)/(60-78) 112/60  ICP ranges-    Intake/Output last 3 shifts:  I/O last 3 completed shifts:  In: 740 [P.O.:740]  Out: 2025 [Urine:2025]    Intake/Output this shift:  I/O this shift:  In: 360 [P.O.:360]  Out: -       Physical Exam:  General:   Lethargic, awake  Chronically ill-appearing  Not answering questions  Expressive aphasia   Moving all extremities purposefully        LABS:    Lab Results (last 24 hours)     Procedure Component Value Units Date/Time    POC Glucose Once [[id number]]  (Normal) Collected:  08/27/19 0726    Specimen:  Blood Updated:  08/27/19 0727     Glucose 125 mg/dL     POC Glucose Once [[id number]]  (Abnormal) Collected:  08/27/19 1113    Specimen:  Blood Updated:  08/27/19 1117     Glucose 138 mg/dL         Results from last 7 days   Lab Units 08/26/19  0658   SODIUM mmol/L 141   POTASSIUM mmol/L 3.6   CHLORIDE mmol/L 104   CO2 mmol/L 27.0   BUN mg/dL 20   CREATININE mg/dL 0.72   GLUCOSE mg/dL 142*   CALCIUM mg/dL 8.5*     Results from last 7 days   Lab Units 08/26/19  0658   WBC 10*3/mm3 5.67   HEMOGLOBIN g/dL 9.7*   HEMATOCRIT % 31.1*   PLATELETS 10*3/mm3 270       IMAGING STUDIES:    No new imaging    Meds reviewed/changed: Yes    Current Facility-Administered Medications   Medication Dose Route Frequency Provider Last Rate Last Dose   • acetaminophen (TYLENOL) tablet 1,000 mg  1,000 mg Oral Q6H PRN Mary Patel MD   1,000 mg at 08/26/19 1941   • aluminum-magnesium hydroxide-simethicone (MAALOX MAX) 400-400-40 MG/5ML suspension 15 mL  15 mL Oral Q6H PRN Viktro Lopez MD   15 mL at 08/04/19 1519   • amLODIPine  (NORVASC) tablet 5 mg  5 mg Oral BID - RT Ajit Howard MD   5 mg at 08/27/19 0916   • apixaban (ELIQUIS) tablet 5 mg  5 mg Oral Q12H Ajit Howard MD   5 mg at 08/27/19 0916   • aspirin tablet 325 mg  325 mg Oral Daily Ajit Howard MD   325 mg at 08/27/19 0916   • atorvastatin (LIPITOR) tablet 80 mg  80 mg Oral Nightly Ajit Howard MD   80 mg at 08/26/19 2351   • brimonidine (ALPHAGAN) 0.2 % ophthalmic solution 1 drop  1 drop Both Eyes BID Ajit Howard MD   1 drop at 08/27/19 0916   • dextrose (D50W) 25 g/ 50mL Intravenous Solution 25 g  25 g Intravenous Q15 Min PRN Ajit Howard MD       • dextrose (GLUTOSE) oral gel 15 g  15 g Oral Q15 Min PRN Ajit Howard MD   15 g at 08/20/19 2059   • dorzolamide (TRUSOPT) 2 % ophthalmic solution 1 drop  1 drop Both Eyes BID Ajit Howard MD   1 drop at 08/27/19 0916   • glipiZIDE (GLUCOTROL) tablet 5 mg  5 mg Oral BID AC Ajit Howard MD   5 mg at 08/27/19 0916   • glucagon (human recombinant) (GLUCAGEN DIAGNOSTIC) injection 1 mg  1 mg Subcutaneous PRN Ajit Howard MD       • hydrALAZINE (APRESOLINE) tablet 50 mg  50 mg Oral Q8H Ajit Howard MD   50 mg at 08/27/19 1343   • insulin lispro (humaLOG) injection 0-7 Units  0-7 Units Subcutaneous 4x Daily With Meals & Nightly Ajit Howard MD   2 Units at 08/23/19 1159   • lansoprazole (FIRST) oral suspension 30 mg  30 mg Oral BID AC Ajit Howard MD   30 mg at 08/27/19 0621   • losartan (COZAAR) 100 mg, hydrochlorothiazide (MICROZIDE) 12.5 mg for HYZAAR 100-12.5   Oral Daily Ajit Howard MD       • metFORMIN (GLUCOPHAGE) tablet 1,000 mg  1,000 mg Oral BID With Meals Ajit Howard MD   1,000 mg at 08/27/19 0916   • nebivolol (BYSTOLIC) tablet 20 mg  20 mg Oral BID Viktor Lopez MD   20 mg at 08/27/19 0916   • nitroglycerin (NITROSTAT) SL tablet 0.4 mg  0.4 mg Sublingual Q5 Min PRN  "Ajit Howard MD       • nystatin (MYCOSTATIN) 545712 UNIT/ML suspension 500,000 Units  5 mL Swish & Spit 4x Daily Ajit Howard MD   500,000 Units at 08/27/19 1119   • OLANZapine (zyPREXA) injection 5 mg  5 mg Intramuscular Q8H PRN Jaylen Heaton III, MD   5 mg at 08/26/19 2014   • PARoxetine (PAXIL) tablet 10 mg  10 mg Oral Daily Ajit Howard MD   10 mg at 08/27/19 0916   • potassium chloride (KLOR-CON) packet 20 mEq  20 mEq Oral Daily With Breakfast Ajit Howard MD   20 mEq at 08/27/19 0916   • vitamin D (ERGOCALCIFEROL) capsule 50,000 Units  50,000 Units Oral Q7 Days Ajit Howard MD   50,000 Units at 08/22/19 1153       Assessment/Plan     ASSESSMENT:      Stroke (cerebrum) (CMS/[facility]ly [facility])      PLAN: Clinically she remains stable following her carotid endarterectomy surgery on July 17.  Per nursing staff, she has had aggressive behavior and required Zovirax yesterday evening.  She is pending evaluation by psych.  Hopefully will be discharge to nursing home soon.  She is to follow-up as previously scheduled with our office in October with carotid ultrasound. We will sign off, please call if needed.     I discussed the patients findings and my recommendations with patient, nursing staff and Dr Harris       LOS: 27 days       GETACHEW Byrnes  8/27/2019  2:38 PM      \"Dictated utilizing Dragon dictation\".        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 RESTLESS LAST NIGHT AND RECEIVED ZYPREXA 5 mg IM at 20:14. . SLEPT AFTER ZYPREXA.PATIENT TOOK HER MEDICATIONS THIS MORNING. COOPERATIVE THIS AM. DOES NOT IDENTIFY ANY NEW COMPLAINT TODAY ON QUESTIONING. FOLLOWING INSTRUCTIONS. CONTINUES WITH APHASIA.  WILL HAVE PSYCHIATRY SEE IN FOLLOW UP FOR ANY FINAL ADJUSTMENTS IN REGIMEN BEFORE DISCHARGE TO SUBACUTE. CANNOT GO TO THE SUBACUTE FACILITY ON A PRN MEDICATION.       History taken from: patient chart RN    Objective     Vital Signs  Temp:  [96.4 °F (35.8 °C)-98 °F (36.7 °C)] 97.4 °F (36.3 °C)  Heart Rate:  [64-72] 66  Resp:  [18-20] 20  BP: (119-165)/(67-78) 165/72    Objective  Physical Exam    MENTAL STATUS -  AWAKE / ALERT.   NOT ANXIOUS PRESENTLY. SMILING.  HEENT- NCAT,   SCLERA NON-ICTERIC, CONJUNCTIVA PINK, OP MOIST, NO JVD   LUNGS - normal respirations.  Clear to auscultation  HEART- RRR   ABD -    non-distended  EXT - NO EDEMA OR CYANOSIS  NEURO -alert.  Aphasia.  She will get occasional single word such as 'yes\" or \"no\"  out. Follows instructions.    MOTOR EXAM -takes resistance bilaterally.         Results Review:     I reviewed the patient's new clinical results.     CT HEAD - AUGUST 11, 2019  FINDINGS:  Old appearing lacunar type infarcts are again noted about the  right thalamus and basal ganglia regions. There are stable areas of  encephalomalacia in the left parietal and occipital lobes medially.  Generalized atrophy. There are moderately extensive scattered areas of  decreased density in the white matter likely related to chronic ischemic  gliotic changes.    No hydrocephalus.    Glucose   Date/Time Value Ref Range Status   08/27/2019 0726 125 70 - 130 mg/dL Final "   08/26/2019 1104 131 (H) 70 - 130 mg/dL Final   08/26/2019 0658 147 (H) 70 - 130 mg/dL Final   08/25/2019 2005 91 70 - 130 mg/dL Final   08/25/2019 1553 120 70 - 130 mg/dL Final   08/25/2019 1146 118 70 - 130 mg/dL Final   08/25/2019 0716 110 70 - 130 mg/dL Final   08/24/2019 2200 124 70 - 130 mg/dL Final     Results from last 7 days   Lab Units 08/26/19  0658 08/23/19  0631 08/21/19  0605   WBC 10*3/mm3 5.67 4.21 4.86   HEMOGLOBIN g/dL 9.7* 9.5* 9.8*   HEMATOCRIT % 31.1* 30.6* 30.6*   PLATELETS 10*3/mm3 270 306 324       Ref. Range 5/22/2019 05:44 5/22/2019 11:34 7/9/2019 08:25 7/18/2019 04:49 7/19/2019 05:05 7/23/2019 05:56 8/1/2019 06:48   Hemoglobin Latest Ref Range: 12.0 - 15.9 g/dL 10.8 (L)  10.6 (L) 8.5 (L) 9.7 (L) 9.3 (L) 7.4 (L)   Hematocrit Latest Ref Range: 34.0 - 46.6 % 35.1  33.4 (L) 26.2 (L) 29.9 (L) 28.6 (L) 23.6 (L)     Results from last 7 days   Lab Units 08/26/19 0658 08/23/19 0631 08/21/19  0605   SODIUM mmol/L 141 137 138   POTASSIUM mmol/L 3.6 3.6 3.5   CHLORIDE mmol/L 104 101 99   CO2 mmol/L 27.0 28.4 28.1   BUN mg/dL 20 17 19   CREATININE mg/dL 0.72 0.73 0.65   CALCIUM mg/dL 8.5* 9.1 8.8   GLUCOSE mg/dL 142* 108* 117*         Ref. Range 8/1/2019 06:47   25 Hydroxy, Vitamin D Latest Ref Range: 30.0 - 100.0 ng/ml 21.8 (L)       Ref. Range 8/1/2019 06:47   Total Cholesterol Latest Ref Range: 0 - 200 mg/dL 105   HDL Cholesterol Latest Ref Range: 40 - 60 mg/dL 40   LDL Cholesterol  Latest Ref Range: 0 - 100 mg/dL 57   VLDL Cholesterol Latest Ref Range: 5 - 40 mg/dL 8   Triglycerides Latest Ref Range: 0 - 150 mg/dL 40   Medication Review: done  Scheduled Meds:    amLODIPine 5 mg Oral BID - RT   apixaban 5 mg Oral Q12H   aspirin 325 mg Oral Daily   atorvastatin 80 mg Oral Nightly   brimonidine 1 drop Both Eyes BID   dorzolamide 1 drop Both Eyes BID   glipiZIDE 5 mg Oral BID AC   hydrALAZINE 50 mg Oral Q8H   insulin lispro 0-7 Units Subcutaneous 4x Daily With Meals & Nightly   lansoprazole 30 mg  Oral BID AC   losartan-HCTZ (HYZAAR) 100-12.5 combo dose  Oral Daily   metFORMIN 1,000 mg Oral BID With Meals   nebivolol 20 mg Oral BID   nystatin 5 mL Swish & Spit 4x Daily   PARoxetine 10 mg Oral Daily   potassium chloride 20 mEq Oral Daily With Breakfast   vitamin D 50,000 Units Oral Q7 Days     Continuous Infusions:   PRN Meds:.•  acetaminophen  •  aluminum-magnesium hydroxide-simethicone  •  dextrose  •  dextrose  •  glucagon (human recombinant)  •  nitroglycerin  •  OLANZapine      Assessment/Plan       Stroke (cerebrum) (CMS/HCC)      Assessment & Plan  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  If add Namenda, will probably discontinue amantadine as she continues alert.  August 11-she has some increased irritability at times.  This may be related to the underlying stroke deficits itself.  She does not appear to have any dysuria, no odor to her urine.  Staff reports that for the most part she is eating okay.  Will check a follow-up CT of the head to assess for any interval visual changes.  She is on aspirin and Eliquis for stroke prophylaxis.  She had noticeably improved alertness when amantadine was started.  She is readily alert now.  This may be related to natural recovery in terms of her level of arousal at this point.  Current plan is to discontinue the amantadine to see if that helps with her irritability and start  on Namenda for aphasia.  August 12 -  Patient with increased restlessness at times.   Continues aphasia. Not able to identify any complaint.  Appears anxious today. No change on CT head yesterday. Started on Namenda for aphasia on 8/12/19.   August 13- will continue to monitor on recent med adjustments   August 15-She continues with expressive language deficits.    Again appears anxious related to her aphasia  and difficulty expressing herself.    She will be able to get occasional single word for the examiner.  She does follow instructions.  The patient was reviewed with .  Discussed adding on an antidepressant with anxiolytic properties -Paxil-as it appears frustration from her aphasia with associated anxiety with the frustration affects her performance.  We will plan on starting Paxil 10 mg daily tomorrow and monitor response.  Reviewed with the patient's  that would not add a short acting anxiolytic (such as a benzodiazepine or Seroquel) as it may affect her cognitive performance.    August 16-started Paxil 10 mg daily  August 19 - Increased restlessness Friday night - ? Related to UTI vs initiation of Paxil. On Cefdinir for GNR pending ID &Sensitivity.  August 20 -urine culture with E. coli ESBL.  Given her aphasia not able to obtain information regarding dysuria.  As the urinalysis was ordered as part of work-up for increased restlessness this weekend, would have to treat as symptomatic although could be asymptomatic bacteriuria.  Will place on ertapenem 1 g IV daily for 3-5 days.  August 21 - increased restlessness/anxiety today. Labs without any acute change. Blood glucose okay this AM. BP pattern okay. No gross motor changes. Follow on current regimen for now.   Addendum-Patient continued today with  increased restlessness/anxiety/refusing aspects of nursing care. She had had a good day in therapies yesterday. She is calmer now with family present. Did allow IV antibiotic to be infused but has not taken PO meds or eaten dinner yet.   Discussed with patient's /children will need to hold on planned discharge tomorrow given her mood/behavior today.   I do not feel Namenda (started as trial for aphasia) is related as had episodes of restlessness prior to starting, but will discontinue for now so  slate with psychoactive medications. Continue Paxil for now. Will consult Psychiatry for  "input.  August 22-patient more cooperative today, taking medications and will participate in therapies.  Still perseverates on \"no\".  Still with some anxiety related to her aphasia but less than yesterday.  Seen by psychiatry and to continue with Paxil 10 mg daily.  Also order written for Zyprexa 5 mg IM every 8 hours as needed agitation.  To observe on current regimen.  August 26 - still gets anxious/frustrated by aphasia. Not participate with therapies or eat meal at times. Continues on Paxil. Will ask Psychiatry for any additional thoughts in AM.   August 27-patient received Zyprexa 5 mg IM at about 830 last evening.  Slept last night.  She is not anxious this morning.  Participating with activities.  Will plan to get updated assessment from psychiatry for any adjustment in regimen before discharge to subacute - per facility cannot go on a prn medication. Addendum: per Psychiatry,  recommend giving Paxil opportunity to exert its clinical effect over the next 1-1/2 to 2 weeks.    Aphasia -  August 12 - trial of Namenda  August 16-continue to observe on Namenda 5 mg daily for her aphasia and may possibly titrate up next week  August 20-titrate up on Namenda to 5 mg twice a day  August 22-discontinued Namenda.  Do not feel related to her anxiety/restlessness as it was present prior to starting but discontinued to avoid any additional psychoactive medications that might add to it.  Had not seen any improvement in her aphasia with the very brief trial.    Dysphagia-Cortrak feeding tube discontinued on July 31 and started on puréed/honey thick liquid diet with strategies  August 7-advance to fork mash nectar thick liquid diet, consistent carbohydrate.  August 14 - repeat VFSS to assess for advancing to thin liquids  August 15-Video fluoroscopic swallow study today-mechanical soft, no mixed consistency, nectar thick liquid, water protocol between meals, no straws. May take meds whole in Puree or NTL as tolerated. May " have ice chips or sips of water in between meals after oral care per protocol.    History of multiple bilateral strokes and left central retinal artery occlusion    History of left greater than right internal carotid artery stenosis-status post left internal carotid artery stent July 17, 2019  Follow with Neurosurgery in office in October with carotid ultrasound    History of hypertension -  Hyzaar 100-25. August 4 - Bystolic increased to 20 mg daily to 20 mg bid.  August 8 - BP still runs high. On discharge in May 2019 was on Hyzaar 100-25 mg daily, Bystolic 20 mg daily, amlodipine 10 mg daily, Hydralazine 50 mg q 8 hours.  Will add Hydralazine initially 10 mg po q 8 hours and titrate up as needed.   August 9-systolic blood pressure elevated last night and early this morning but better this afternoon at 122-130.  Continue to follow recent addition of hydralazine and titrate up as needed  August 10-systolic blood pressure 175 this afternoon, range otherwise 122-142.  Will titrate up on hydralazine to 20 mg every 8 hours.  August 11-hypertension overall appears improved.  Will titrate up further to 25 mg every 8 hours.  August 12 - add amlodipine 5 mg daily.   August 14 - titrate up on hydralazine to 50 mg q 8 hours  August 15-blood pressure improved this afternoon with systolic blood pressure in the 120s-follow on current regimen  August 16-blood pressure range 110////59-will continue to monitor on present regimen  August 19 - /74 early AM, later 136/71 - increase amlodipine to 5 mg bid    History of diabetes mellitus -Lantus/glipizide (home medication metformin 1000 mg twice daily and glipizide 10 mg twice daily)  August 1-blood sugars were low yesterday afternoon after tube feeds discontinued.  Held glipizide last night and this morning and Lantus last night.  Blood sugar elevated at noontime today.  Will receive resume glipizide at a lower dose of 5 mg twice a day with meals.  Continue  sliding scale insulin coverage.  She also is on metformin at home.  August 5-add back metformin initially at 500 mg twice a day and continue glipizide 5 mg twice a day, both one half of previous home dose, titrate up as needed.  August 7-titrate up metformin to 850 mg twice a day.  Add consistent carbohydrate restriction to diet.  August 9-increase metformin to 1000 g twice a day.  August 10-blood glucose 340 last evening but 150-114-178 so far today  August 11-continue to follow blood sugar pattern and may increase glipizide further as current dose glipizide 5 mg twice a day along with metformin 1000 mg twice a day  August 14 - blood glucose  past 24 hours - monitor pattern.  August 15-blood glucose [phone]--[phone]-continue to follow pattern.  Will change sliding scale insulin coverage to Humalog at a lower dose.  She was started on the regular insulin sliding scale when she was on tube feeds.  August 16 - recent blood glucose -516-138  August 19 - recent blood glucose [phone] - increase glipizide to 7.5 mg bid  August 21 - refused blood glucose check today. Will decrease glipizide back to 5 mg bid to avoid potential for hypoglycemia until allow blood glucose check.     Stroke prophylaxis-aspirin/Eliquis/atorvastatin    Anemia-August 1-hemoglobin 7.4.  Most recent check on July 23 HGB 9.3.  Will recheck hemoglobin this afternoon.  Hemoccult stool.  Iron studies.  Reticulocyte count.  Recheck CBC in the a.m.  Added Protonix.  Patient is on aspirin and Eliquis.  With recent stroke  will look to transfuse packed red blood cell.  August 2-hemoglobin improved 9.0-1 unit packed red blood cells.  Elevated reticulocyte count in response to anemia.  Nursing describes dark stool.  Patient discussed with gastroenterology.  At this point unable to do endoscopy as on Eliquis and aspirin.  Will treat symptomatically for now with increasing proton pump inhibitor and transfusing as needed.  August  5-anemia improved-hemoglobin 9.8  August 16-hemoglobin unchanged 9.8    DVT prophylaxis-SCDs/anticoagulation    Impulsivity-   Nursing to do re-orientation with the patient.  Room close to the nursing station.    Endocrine-vitamin D deficiency-ergocalciferol 50,000 units weekly x8 weeks added. Vitamin B12 level and TSH checked in late May unremarkable    Urinary tract infection-August 20 -urine culture with E. coli ESBL.  Given her aphasia not able to obtain information regarding dysuria.  As the urinalysis was ordered as part of work-up for increased restlessness this weekend, would have to treat as symptomatic although could be asymptomatic bacteriuria.  Will place on ertapenem 1 g IV daily for 3-5 days.     Impaired cognition/impaired language, impaired swallow, impaired activity daily living, impaired mobility     Now admit for comprehensive acute inpatient rehabilitation .  This would be an interdisciplinary program with physical therapy 1 hour,  occupational therapy 1 hour, and speech therapy 1 hour, 5 days a week.  Rehabilitation nursing for carryover, monitoring of cardiopulmonary and neurologic   status, bowel and bladder, and skin  Ongoing physician follow-up.  Weekly team conferences.  Goals are indeterminant.   Rehabilitation prognosis determined.  Medical prognosis determined.  Estimated length of stay is indeterminate.    TEAM CONF - AUGUST 6- TRANFERS CTG. GAIT UP  FEET CTG-MIN ASSIST. UBD MIN. LBD MOD. BATH MOD. SEVERE APHASIA. INCREASED RESISTANCE TO PARTICIPATE AT TIMES.   PUREE/HTL.  GOOD PO INTAKE. ADJUSTING MEDS FOR DIABETES MELLITUS.  BLADDER CONTINENT/INCONTIENT.   ELOS- 2 WEEKS.     TEAM CONF - AUGUST 13 - DID GREAT WITH PT ON Saturday, FOLLOWING COMMANDS AND BATTING A BALL WITH ANOTHER PATIENT. YESTERDAY, VERY FRUSTRATED AND IRRITABLE.  FRUSTRATED BY APHASIA, TRYING TO TELL STAFF SOMETHING. WILL HOLD ON TO OBJECTS, REPEAT SINGLE WORD.   BED CTG. 4 STAIRS CTG.  GAIT 220 FEET CTG-SBA NO  DEVICE.  TOILET TRANSFERS CTG-MIN.  GROOMING MIN.  UBD MIN ASSIST FOR BRA, LBD CTG-MIN. BATH CTG-MIN. COORDINATION RUE BETTER.  DYSPHAGIA - IMPROVED - [place]AS. DO NOT FEEL SHE WOULD TOLERATE E-STIM. RECEPTIVE LANGUAGE IMPROVED SLIGHTLY , POINTING TO OBJECTS. EXPRESSIVELY PERSEVERATIVE ON A SINGLE WORD, TRIES TO USE PICTURE BOARD TO COMMUNICATE. YES/NO NOT ACCURATE.  CONTINENT BOWEL AND BLADDER, TIMED VOIDS. SKIN INTACT. TYPICALLY GOOD INTAKE.  ELOS - 1.5 WEEKS    AUGUST 14 - In therapies - In OT, increased participation noted with  present   Transfers SBA/CTG  Walked from therapy gym to room without AD SBA and vc's for directions back to the room   300' outdoors on sidewalk, incline; 300', 180', 100' up on rehab unit. Pt was able to find her room when she got close to it  Bathing, dressing, grooming min assist. Toileting moderate assist.  Pt participated in using R hand to manipualte round pegs into peg board by color with MIN difficutly once pt caught on to the task. with 3D block recreation with R hand with 100% color match, 80% accuracy with block recreation  With SLP, patient was not able to effectively communicate her wants/needs but refused to go to therapy office by planting her heels while rolling in wc down the [place]; tx session completed in her room     August 15-The patient was reviewed with .  Discussed adding on an antidepressant with anxiolytic properties -Paxil-as it appears frustration from her aphasia with associated anxiety with the frustration affects her performance.  We will plan on starting Paxil 10 mg daily tomorrow and monitor response.  Reviewed with the patient's  that would not add a short acting anxiolytic (such as a benzodiazepine or Seroquel) as it may affect her cognitive performance.    August 16-Patient was reviewed with her daughter today including rehabilitation goals, discharge planning, and recent medication adjustment to try to help with her  anxiety/frustration with her aphasia.  Reviewed with the daughter that on discussion with the team is felt that she would do better with her functional status/aphasia/anxiety if able to be discharged to home environment with more frequent interactions but if that is not feasible with the need to look at subacute options.    TEAM CONF - AUGUST 20 - GAIT CTG- FEET. NO DEVICE, WALKS TO AND FROM GYM.  ADLS CTG WITH CUING.  PLAYED ENTIRE GAME OF CHECKERS YESTERDAY. VARIABLE PERFORMANCE WITH SPEECH THERAPY 20-90% MATCHING WORDS TO PICTURES.    MECH SOFT, GROUND MEAT, NECTAR THICK LIQUIDS.   TYPICALLY CONTINENT BLADDER BUT INCONTINENT BOWEL. DID FINE LAST EVENING PER SISTER AND DID NOT TRY TO GET UP- WILL DISCONTINUE SITTER. IN ROOM CLOSE TO NURSING STATION.   [facility]- FAMILY CONF LAST WEEK- WILL WORK TOWARD SUBACUTE PLACEMENT     August 23-upper body dressing set up, lower body dressing min assist.  Grooming supervision.  Transfers standby assist.  Ambulated 220 feet without a device contact-guard standby assist.  She spent in therapy this morning but not with physical therapy this afternoon    TEAM JEFFREY - AUGUST 27 - CONTINUES WITH APHASIA. WORKED ON MELODIC INTONATION. BED SBA. 4 STAIRS CTG. GAIT 300 FEET SBA. TOILET TRANSFERS SBA. SHOWER TRANSFERS SBA. VOIDING ON OWN. UBD MIN ASSIST. LBD CTG-MIN. BATH CTG. DIET - MECH SOFT, GROUND MEATS, NECTAR THICK LIQUIDS. NOT ABLE TO MANAGE AT HOME IN SHORT TERM. WILL ASK PSYCHIATRY SEE IN FOLLOW UP FOR ANY FINAL ADJUSTMENTS IN REGIMEN BEFORE DISCHARGE TO SUBACUTE.    .  [facility]- DISPOSITION PENDING.        Barrier to discharge includes  Impaired cognitive-linguistic skills - work on receptive and expressive language and cognition.       Ajit Howard MD  08/27/19  7:51 AM    Time:            Electronically signed by Ajit Howard MD at 8/28/2019  9:44 AM     Ajit Howard MD at 8/26/2019  5:10 PM             LOS: 26 days   Patient Care Team:  [place]  "MD Eric as PCP - General (General Practice)    Chief Complaint:     Status post left CVA with aphasia and spastic right hemiparesis  Dysphagia- History of multiple bilateral strokes and left central retinal artery occlusion  History of left greater than right internal carotid artery stenosis-status post left internal carotid artery stent July 17, 2019  History of hypertension  History of diabetes mellitus  Impulsivity-room close to nursing station-bed alarm  Anemia  Vitamin D deficiency        Subjective     History of Present Illness    Subjective  The patient continues with expressive aphasia.  Gets frustrated.  Perseverates on \"no\".  Participating in therapies this morning but not this afternoon.  Does not identify any focal complaint  Patient reviewed with nursing earlier today and discontinued sitter.  Reviewed patient with her family this afternoon.      History taken from: patient chart RN    Objective     Vital Signs  Temp:  [98 °F (36.7 °C)-98.7 °F (37.1 °C)] 98 °F (36.7 °C)  Heart Rate:  [60-76] 72  Resp:  [16-18] 18  BP: (100-149)/(55-70) 119/70    Objective  Physical Exam  MENTAL STATUS -  AWAKE / ALERT.  Anxious   HEENT-    LUNGS - normal respirations.    HEART-    ABD -   Non-distended  EXT - no edema  NEURO -alert.  Aphasia.      MOTOR EXAM -takes resistance in the bilateral upper extremities and lower extremities         Results Review:     I reviewed the patient's new clinical results.     CT HEAD - AUGUST 11, 2019  FINDINGS:  Old appearing lacunar type infarcts are again noted about the  right thalamus and basal ganglia regions. There are stable areas of  encephalomalacia in the left parietal and occipital lobes medially.  Generalized atrophy. There are moderately extensive scattered areas of  decreased density in the white matter likely related to chronic ischemic  gliotic changes.    No hydrocephalus.    Glucose   Date/Time Value Ref Range Status   08/26/2019 1104 131 (H) 70 - 130 mg/dL Final "   08/26/2019 0658 147 (H) 70 - 130 mg/dL Final   08/25/2019 2005 91 70 - 130 mg/dL Final   08/25/2019 1553 120 70 - 130 mg/dL Final   08/25/2019 1146 118 70 - 130 mg/dL Final   08/25/2019 0716 110 70 - 130 mg/dL Final   08/24/2019 2200 124 70 - 130 mg/dL Final   08/24/2019 1638 103 70 - 130 mg/dL Final     Results from last 7 days   Lab Units 08/26/19  0658 08/23/19  0631 08/21/19  0605   WBC 10*3/mm3 5.67 4.21 4.86   HEMOGLOBIN g/dL 9.7* 9.5* 9.8*   HEMATOCRIT % 31.1* 30.6* 30.6*   PLATELETS 10*3/mm3 270 306 324       Ref. Range 5/22/2019 05:44 5/22/2019 11:34 7/9/2019 08:25 7/18/2019 04:49 7/19/2019 05:05 7/23/2019 05:56 8/1/2019 06:48   Hemoglobin Latest Ref Range: 12.0 - 15.9 g/dL 10.8 (L)  10.6 (L) 8.5 (L) 9.7 (L) 9.3 (L) 7.4 (L)   Hematocrit Latest Ref Range: 34.0 - 46.6 % 35.1  33.4 (L) 26.2 (L) 29.9 (L) 28.6 (L) 23.6 (L)     Results from last 7 days   Lab Units 08/26/19  0658 08/23/19 0631 08/21/19  0605   SODIUM mmol/L 141 137 138   POTASSIUM mmol/L 3.6 3.6 3.5   CHLORIDE mmol/L 104 101 99   CO2 mmol/L 27.0 28.4 28.1   BUN mg/dL 20 17 19   CREATININE mg/dL 0.72 0.73 0.65   CALCIUM mg/dL 8.5* 9.1 8.8   GLUCOSE mg/dL 142* 108* 117*         Ref. Range 8/1/2019 06:47   25 Hydroxy, Vitamin D Latest Ref Range: 30.0 - 100.0 ng/ml 21.8 (L)       Ref. Range 8/1/2019 06:47   Total Cholesterol Latest Ref Range: 0 - 200 mg/dL 105   HDL Cholesterol Latest Ref Range: 40 - 60 mg/dL 40   LDL Cholesterol  Latest Ref Range: 0 - 100 mg/dL 57   VLDL Cholesterol Latest Ref Range: 5 - 40 mg/dL 8   Triglycerides Latest Ref Range: 0 - 150 mg/dL 40   Medication Review: done  Scheduled Meds:    amLODIPine 5 mg Oral BID - RT   apixaban 5 mg Oral Q12H   aspirin 325 mg Oral Daily   atorvastatin 80 mg Oral Nightly   brimonidine 1 drop Both Eyes BID   dorzolamide 1 drop Both Eyes BID   glipiZIDE 5 mg Oral BID AC   hydrALAZINE 50 mg Oral Q8H   insulin lispro 0-7 Units Subcutaneous 4x Daily With Meals & Nightly   lansoprazole 30 mg Oral  BID AC   losartan-HCTZ (HYZAAR) 100-12.5 combo dose  Oral Daily   metFORMIN 1,000 mg Oral BID With Meals   nebivolol 20 mg Oral BID   nystatin 5 mL Swish & Spit 4x Daily   PARoxetine 10 mg Oral Daily   potassium chloride 20 mEq Oral Daily With Breakfast   vitamin D 50,000 Units Oral Q7 Days     Continuous Infusions:   PRN Meds:.•  acetaminophen  •  aluminum-magnesium hydroxide-simethicone  •  dextrose  •  dextrose  •  glucagon (human recombinant)  •  nitroglycerin  •  OLANZapine      Assessment/Plan       Stroke (cerebrum) (CMS/HCC)      Assessment & Plan  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  If add Namenda, will probably discontinue amantadine as she continues alert.  August 11-she has some increased irritability at times.  This may be related to the underlying stroke deficits itself.  She does not appear to have any dysuria, no odor to her urine.  Staff reports that for the most part she is eating okay.  Will check a follow-up CT of the head to assess for any interval visual changes.  She is on aspirin and Eliquis for stroke prophylaxis.  She had noticeably improved alertness when amantadine was started.  She is readily alert now.  This may be related to natural recovery in terms of her level of arousal at this point.  Current plan is to discontinue the amantadine to see if that helps with her irritability and start  on Namenda for aphasia.  August 12 -  Patient with increased restlessness at times.   Continues aphasia. Not able to identify any complaint.  Appears anxious today. No change on CT head yesterday. Started on Namenda for aphasia on 8/12/19.   August 13- will continue to monitor on recent med adjustments   August 15-She continues with expressive language deficits.    Again appears anxious related to her aphasia and  difficulty expressing herself.    She will be able to get occasional single word for the examiner.  She does follow instructions.  The patient was reviewed with .  Discussed adding on an antidepressant with anxiolytic properties -Paxil-as it appears frustration from her aphasia with associated anxiety with the frustration affects her performance.  We will plan on starting Paxil 10 mg daily tomorrow and monitor response.  Reviewed with the patient's  that would not add a short acting anxiolytic (such as a benzodiazepine or Seroquel) as it may affect her cognitive performance.    August 16-started Paxil 10 mg daily  August 19 - Increased restlessness Friday night - ? Related to UTI vs initiation of Paxil. On Cefdinir for GNR pending ID &Sensitivity.  August 20 -urine culture with E. coli ESBL.  Given her aphasia not able to obtain information regarding dysuria.  As the urinalysis was ordered as part of work-up for increased restlessness this weekend, would have to treat as symptomatic although could be asymptomatic bacteriuria.  Will place on ertapenem 1 g IV daily for 3-5 days.  August 21 - increased restlessness/anxiety today. Labs without any acute change. Blood glucose okay this AM. BP pattern okay. No gross motor changes. Follow on current regimen for now.   Addendum-Patient continued today with  increased restlessness/anxiety/refusing aspects of nursing care. She had had a good day in therapies yesterday. She is calmer now with family present. Did allow IV antibiotic to be infused but has not taken PO meds or eaten dinner yet.   Discussed with patient's /children will need to hold on planned discharge tomorrow given her mood/behavior today.   I do not feel Namenda (started as trial for aphasia) is related as had episodes of restlessness prior to starting, but will discontinue for now so  slate with psychoactive medications. Continue Paxil for now. Will consult Psychiatry for  "input.  August 22-patient more cooperative today, taking medications and will participate in therapies.  Still perseverates on \"no\".  Still with some anxiety related to her aphasia but less than yesterday.  Seen by psychiatry and to continue with Paxil 10 mg daily.  Also order written for Zyprexa 5 mg IM every 8 hours as needed agitation.  To observe on current regimen.  August 26 - still gets anxious/frustrated by aphasia. Not participate with therapies or eat meal at times. Continues on Paxil. Will ask Psychiatry for any additional thoughts in AM.     Aphasia -  August 12 - trial of Namenda  August 16-continue to observe on Namenda 5 mg daily for her aphasia and may possibly titrate up next week  August 20-titrate up on Namenda to 5 mg twice a day  August 22-discontinued Namenda.  Do not feel related to her anxiety/restlessness as it was present prior to starting but discontinued to avoid any additional psychoactive medications that might add to it.  Had not seen any improvement in her aphasia with the very brief trial.    Dysphagia-Cortrak feeding tube discontinued on July 31 and started on puréed/honey thick liquid diet with strategies  August 7-advance to fork mash nectar thick liquid diet, consistent carbohydrate.  August 14 - repeat VFSS to assess for advancing to thin liquids  August 15-Video fluoroscopic swallow study today-mechanical soft, no mixed consistency, nectar thick liquid, water protocol between meals, no straws.    History of multiple bilateral strokes and left central retinal artery occlusion    History of left greater than right internal carotid artery stenosis-status post left internal carotid artery stent July 17, 2019    History of hypertension -  Hyzaar 100-25. August 4 - Bystolic increased to 20 mg daily to 20 mg bid.  August 8 - BP still runs high. On discharge in May 2019 was on Hyzaar 100-25 mg daily, Bystolic 20 mg daily, amlodipine 10 mg daily, Hydralazine 50 mg q 8 hours.  Will add " Hydralazine initially 10 mg po q 8 hours and titrate up as needed.   August 9-systolic blood pressure elevated last night and early this morning but better this afternoon at 122-130.  Continue to follow recent addition of hydralazine and titrate up as needed  August 10-systolic blood pressure 175 this afternoon, range otherwise 122-142.  Will titrate up on hydralazine to 20 mg every 8 hours.  August 11-hypertension overall appears improved.  Will titrate up further to 25 mg every 8 hours.  August 12 - add amlodipine 5 mg daily.   August 14 - titrate up on hydralazine to 50 mg q 8 hours  August 15-blood pressure improved this afternoon with systolic blood pressure in the 120s-follow on current regimen  August 16-blood pressure range 110////59-will continue to monitor on present regimen  August 19 - /74 early AM, later 136/71 - increase amlodipine to 5 mg bid    History of diabetes mellitus -Lantus/glipizide (home medication metformin 1000 mg twice daily and glipizide 10 mg twice daily)  August 1-blood sugars were low yesterday afternoon after tube feeds discontinued.  Held glipizide last night and this morning and Lantus last night.  Blood sugar elevated at noontime today.  Will receive resume glipizide at a lower dose of 5 mg twice a day with meals.  Continue sliding scale insulin coverage.  She also is on metformin at home.  August 5-add back metformin initially at 500 mg twice a day and continue glipizide 5 mg twice a day, both one half of previous home dose, titrate up as needed.  August 7-titrate up metformin to 850 mg twice a day.  Add consistent carbohydrate restriction to diet.  August 9-increase metformin to 1000 g twice a day.  August 10-blood glucose 340 last evening but 150-114-178 so far today  August 11-continue to follow blood sugar pattern and may increase glipizide further as current dose glipizide 5 mg twice a day along with metformin 1000 mg twice a day  August 14 - blood  glucose  past 24 hours - monitor pattern.  August 15-blood glucose [phone]--[phone]-continue to follow pattern.  Will change sliding scale insulin coverage to Humalog at a lower dose.  She was started on the regular insulin sliding scale when she was on tube feeds.  August 16 - recent blood glucose -729-138  August 19 - recent blood glucose [phone] - increase glipizide to 7.5 mg bid  August 21 - refused blood glucose check today. Will decrease glipizide back to 5 mg bid to avoid potential for hypoglycemia until allow blood glucose check.     Stroke prophylaxis-aspirin/Eliquis/atorvastatin    Anemia-August 1-hemoglobin 7.4.  Most recent check on July 23 HGB 9.3.  Will recheck hemoglobin this afternoon.  Hemoccult stool.  Iron studies.  Reticulocyte count.  Recheck CBC in the a.m.  Added Protonix.  Patient is on aspirin and Eliquis.  With recent stroke  will look to transfuse packed red blood cell.  August 2-hemoglobin improved 9.0-1 unit packed red blood cells.  Elevated reticulocyte count in response to anemia.  Nursing describes dark stool.  Patient discussed with gastroenterology.  At this point unable to do endoscopy as on Eliquis and aspirin.  Will treat symptomatically for now with increasing proton pump inhibitor and transfusing as needed.  August 5-anemia improved-hemoglobin 9.8  August 16-hemoglobin unchanged 9.8    DVT prophylaxis-SCDs/anticoagulation    Impulsivity-   Nursing to do re-orientation with the patient.  Room close to the nursing station.    Endocrine-vitamin D deficiency-ergocalciferol 50,000 units weekly x8 weeks added. Vitamin B12 level and TSH checked in late May unremarkable    Urinary tract infection-August 20 -urine culture with E. coli ESBL.  Given her aphasia not able to obtain information regarding dysuria.  As the urinalysis was ordered as part of work-up for increased restlessness this weekend, would have to treat as symptomatic although could be  asymptomatic bacteriuria.  Will place on ertapenem 1 g IV daily for 3-5 days.     Impaired cognition/impaired language, impaired swallow, impaired activity daily living, impaired mobility     Now admit for comprehensive acute inpatient rehabilitation .  This would be an interdisciplinary program with physical therapy 1 hour,  occupational therapy 1 hour, and speech therapy 1 hour, 5 days a week.  Rehabilitation nursing for carryover, monitoring of cardiopulmonary and neurologic   status, bowel and bladder, and skin  Ongoing physician follow-up.  Weekly team conferences.  Goals are indeterminant.   Rehabilitation prognosis determined.  Medical prognosis determined.  Estimated length of stay is indeterminate.    TEAM CONF - AUGUST 6- TRANFERS CTG. GAIT UP  FEET CTG-MIN ASSIST. UBD MIN. LBD MOD. BATH MOD. SEVERE APHASIA. INCREASED RESISTANCE TO PARTICIPATE AT TIMES.   PUREE/HTL.  GOOD PO INTAKE. ADJUSTING MEDS FOR DIABETES MELLITUS.  BLADDER CONTINENT/INCONTIENT.   ELOS- 2 WEEKS.     TEAM CONF - AUGUST 13 - DID GREAT WITH PT ON Saturday, FOLLOWING COMMANDS AND BATTING A BALL WITH ANOTHER PATIENT. YESTERDAY, VERY FRUSTRATED AND IRRITABLE.  FRUSTRATED BY APHASIA, TRYING TO TELL STAFF SOMETHING. WILL HOLD ON TO OBJECTS, REPEAT SINGLE WORD.   BED CTG. 4 STAIRS CTG.  GAIT 220 FEET CTG-SBA NO DEVICE.  TOILET TRANSFERS CTG-MIN.  GROOMING MIN.  UBD MIN ASSIST FOR BRA, LBD CTG-MIN. BATH CTG-MIN. COORDINATION RUE BETTER.  DYSPHAGIA - IMPROVED - FORK TALA NTL. DO NOT FEEL SHE WOULD TOLERATE E-STIM. RECEPTIVE LANGUAGE IMPROVED SLIGHTLY , POINTING TO OBJECTS. EXPRESSIVELY PERSEVERATIVE ON A SINGLE WORD, TRIES TO USE PICTURE BOARD TO COMMUNICATE. YES/NO NOT ACCURATE.  CONTINENT BOWEL AND BLADDER, TIMED VOIDS. SKIN INTACT. TYPICALLY GOOD INTAKE.  ELOS - 1.5 WEEKS    AUGUST 14 - In therapies - In OT, increased participation noted with  present   Transfers SBA/CTG  Walked from therapy gym to room without AD SBA and vc's  for directions back to the room   300' outdoors on sidewalk, incline; 300', 180', 100' up on rehab unit. Pt was able to find her room when she got close to it  Bathing, dressing, grooming min assist. Toileting moderate assist.  Pt participated in using R hand to manipualte round pegs into peg board by color with MIN difficutly once pt caught on to the task. with 3D block recreation with R hand with 100% color match, 80% accuracy with block recreation  With SLP, patient was not able to effectively communicate her wants/needs but refused to go to therapy office by planting her heels while rolling in wc down the [place]; tx session completed in her room     August 15-The patient was reviewed with .  Discussed adding on an antidepressant with anxiolytic properties -Paxil-as it appears frustration from her aphasia with associated anxiety with the frustration affects her performance.  We will plan on starting Paxil 10 mg daily tomorrow and monitor response.  Reviewed with the patient's  that would not add a short acting anxiolytic (such as a benzodiazepine or Seroquel) as it may affect her cognitive performance.    August 16-Patient was reviewed with her daughter today including rehabilitation goals, discharge planning, and recent medication adjustment to try to help with her anxiety/frustration with her aphasia.  Reviewed with the daughter that on discussion with the team is felt that she would do better with her functional status/aphasia/anxiety if able to be discharged to home environment with more frequent interactions but if that is not feasible with the need to look at subacute options.    TEAM CONF - AUGUST 20 - GAIT CTG- FEET. NO DEVICE, WALKS TO AND FROM GYM.  ADLS CTG WITH CUING.  PLAYED ENTIRE GAME OF [employer] YESTERDAY. VARIABLE PERFORMANCE WITH SPEECH THERAPY 20-90% MATCHING WORDS TO PICTURES.    MECH SOFT, GROUND MEAT, NECTAR THICK LIQUIDS.   TYPICALLY CONTINENT BLADDER BUT INCONTINENT  BOWEL. DID FINE LAST EVENING PER SISTER AND DID NOT TRY TO GET UP- WILL DISCONTINUE SITTER. IN ROOM CLOSE TO NURSING STATION.   ELOS- FAMILY CONF LAST WEEK- WILL WORK TOWARD SUBACUTE PLACEMENT     August 23-upper body dressing set up, lower body dressing min assist.  Grooming supervision.  Transfers standby assist.  Ambulated 220 feet without a device contact-guard standby assist.  She spent in therapy this morning but not with physical therapy this afternoon     Barrier to discharge includes  Impaired cognitive-linguistic skills - work on receptive and expressive language and cognition.       Ajit Howard MD  08/26/19  5:10 PM    Time:            Electronically signed by Ajit Howard MD at 8/26/2019  5:15 PM     Ajit Howard MD at 8/26/2019 12:22 PM        Patient reviewed with nursing.  Overall doing better today - will discontinue sitter.  Continue present pharmacologic regimen.    Electronically signed by Ajit Howard MD at 8/26/2019 12:23 PM     Oj Avila MD at 8/25/2019  4:08 PM             LOS: 25 days   Patient Care Team:  Eric Matta MD as PCP - General (General Practice)    Chief Complaint:     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  Continues with improved strength on the right side arm more than leg.  Patient seen in bed this afternoon.  Did not identify any new complaint but limited by her aphasia.  Still appears frustrated by her aphasia.      History taken from: patient chart  RN    Objective     Vital Signs  Temp:  [96.7 °F (35.9 °C)-98.4 °F (36.9 °C)] 96.7 °F (35.9 °C)  Heart Rate:  [64-80] 80  Resp:  [18] 18  BP: (120-144)/(60-77) 120/60    Objective  Physical Exam  MENTAL STATUS -  AWAKE / ALERT.  Anxious but less so than yesterday  HEENT-    LUNGS - normal respirations.   Clear to auscultation  HEART- regular  ABD -   normoactive bowel sounds.  Soft nontender.  EXT - no edema  NEURO -alert.  Aphasia.      MOTOR EXAM -takes resistance in the bilateral upper extremities and lower extremities left more then right         Results Review:     I reviewed the patient's new clinical results.     CT HEAD - AUGUST 11, 2019  FINDINGS:  Old appearing lacunar type infarcts are again noted about the  right thalamus and basal ganglia regions. There are stable areas of  encephalomalacia in the left parietal and occipital lobes medially.  Generalized atrophy. There are moderately extensive scattered areas of  decreased density in the white matter likely related to chronic ischemic  gliotic changes.    No hydrocephalus.    Glucose   Date/Time Value Ref Range Status   08/25/2019 1553 120 70 - 130 mg/dL Final   08/25/2019 1146 118 70 - 130 mg/dL Final   08/25/2019 0716 110 70 - 130 mg/dL Final   08/24/2019 2200 124 70 - 130 mg/dL Final   08/24/2019 1638 103 70 - 130 mg/dL Final   08/24/2019 1135 150 (H) 70 - 130 mg/dL Final   08/24/2019 0612 117 70 - 130 mg/dL Final   08/23/2019 2031 138 (H) 70 - 130 mg/dL Final     Results from last 7 days   Lab Units 08/23/19  0631 08/21/19  0605 08/19/19  0739   WBC 10*3/mm3 4.21 4.86 5.32   HEMOGLOBIN g/dL 9.5* 9.8* 10.3*   HEMATOCRIT % 30.6* 30.6* 33.7*   PLATELETS 10*3/mm3 306 324 382       Ref. Range 5/22/2019 05:44 5/22/2019 11:34 7/9/2019 08:25 7/18/2019 04:49 7/19/2019 05:05 7/23/2019 05:56 8/1/2019 06:48   Hemoglobin Latest Ref Range: 12.0 - 15.9 g/dL 10.8 (L)  10.6 (L) 8.5 (L) 9.7 (L) 9.3 (L) 7.4 (L)   Hematocrit Latest Ref Range: 34.0 - 46.6 % 35.1  33.4  (L) 26.2 (L) 29.9 (L) 28.6 (L) 23.6 (L)     Results from last 7 days   Lab Units 08/23/19  0631 08/21/19  0605 08/19/19  0739   SODIUM mmol/L 137 138 137   POTASSIUM mmol/L 3.6 3.5 3.8   CHLORIDE mmol/L 101 99 101   CO2 mmol/L 28.4 28.1 27.3   BUN mg/dL 17 19 12   CREATININE mg/dL 0.73 0.65 0.63   CALCIUM mg/dL 9.1 8.8 8.9   GLUCOSE mg/dL 108* 117* 168*         Ref. Range 8/1/2019 06:47   25 Hydroxy, Vitamin D Latest Ref Range: 30.0 - 100.0 ng/ml 21.8 (L)       Ref. Range 8/1/2019 06:47   Total Cholesterol Latest Ref Range: 0 - 200 mg/dL 105   HDL Cholesterol Latest Ref Range: 40 - 60 mg/dL 40   LDL Cholesterol  Latest Ref Range: 0 - 100 mg/dL 57   VLDL Cholesterol Latest Ref Range: 5 - 40 mg/dL 8   Triglycerides Latest Ref Range: 0 - 150 mg/dL 40   Medication Review: done  Scheduled Meds:    amLODIPine 5 mg Oral BID - RT   apixaban 5 mg Oral Q12H   aspirin 325 mg Oral Daily   atorvastatin 80 mg Oral Nightly   brimonidine 1 drop Both Eyes BID   dorzolamide 1 drop Both Eyes BID   glipiZIDE 5 mg Oral BID AC   hydrALAZINE 50 mg Oral Q8H   insulin lispro 0-7 Units Subcutaneous 4x Daily With Meals & Nightly   lansoprazole 30 mg Oral BID AC   losartan-HCTZ (HYZAAR) 100-12.5 combo dose  Oral Daily   metFORMIN 1,000 mg Oral BID With Meals   nebivolol 20 mg Oral BID   nystatin 5 mL Swish & Spit 4x Daily   PARoxetine 10 mg Oral Daily   potassium chloride 20 mEq Oral Daily With Breakfast   vitamin D 50,000 Units Oral Q7 Days     Continuous Infusions:   PRN Meds:.•  acetaminophen  •  aluminum-magnesium hydroxide-simethicone  •  dextrose  •  dextrose  •  glucagon (human recombinant)  •  nitroglycerin  •  OLANZapine      Assessment/Plan       Stroke (cerebrum) (CMS/HCC)      Assessment & Plan  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  If add  Namenda, will probably discontinue amantadine as she continues alert.  August 11-she has some increased irritability at times.  This may be related to the underlying stroke deficits itself.  She does not appear to have any dysuria, no odor to her urine.  Staff reports that for the most part she is eating okay.  Will check a follow-up CT of the head to assess for any interval visual changes.  She is on aspirin and Eliquis for stroke prophylaxis.  She had noticeably improved alertness when amantadine was started.  She is readily alert now.  This may be related to natural recovery in terms of her level of arousal at this point.  Current plan is to discontinue the amantadine to see if that helps with her irritability and start  on Namenda for aphasia.  August 12 -  Patient with increased restlessness at times.   Continues aphasia. Not able to identify any complaint.  Appears anxious today. No change on CT head yesterday. Started on Namenda for aphasia on 8/12/19.   August 13- will continue to monitor on recent med adjustments   August 15-She continues with expressive language deficits.    Again appears anxious related to her aphasia and difficulty expressing herself.    She will be able to get occasional single word for the examiner.  She does follow instructions.  The patient was reviewed with .  Discussed adding on an antidepressant with anxiolytic properties -Paxil-as it appears frustration from her aphasia with associated anxiety with the frustration affects her performance.  We will plan on starting Paxil 10 mg daily tomorrow and monitor response.  Reviewed with the patient's  that would not add a short acting anxiolytic (such as a benzodiazepine or Seroquel) as it may affect her cognitive performance.    August 16-started Paxil 10 mg daily  August 19 - Increased restlessness Friday night - ? Related to UTI vs initiation of Paxil. On Cefdinir for GNR pending ID &Sensitivity.  August 20 -urine culture  "with E. coli ESBL.  Given her aphasia not able to obtain information regarding dysuria.  As the urinalysis was ordered as part of work-up for increased restlessness this weekend, would have to treat as symptomatic although could be asymptomatic bacteriuria.  Will place on ertapenem 1 g IV daily for 3-5 days.  August 21 - increased restlessness/anxiety today. Labs without any acute change. Blood glucose okay this AM. BP pattern okay. No gross motor changes. Follow on current regimen for now.   Addendum-Patient continued today with  increased restlessness/anxiety/refusing aspects of nursing care. She had had a good day in therapies yesterday. She is calmer now with family present. Did allow IV antibiotic to be infused but has not taken PO meds or eaten dinner yet.   Discussed with patient's /children will need to hold on planned discharge tomorrow given her mood/behavior today.   I do not feel Namenda (started as trial for aphasia) is related as had episodes of restlessness prior to starting, but will discontinue for now so  slate with psychoactive medications. Continue Paxil for now. Will consult Psychiatry for input.  August 22-patient more cooperative today, taking medications and will participate in therapies.  Still perseverates on \"no\".  Still with some anxiety related to her aphasia but less than yesterday.  Seen by psychiatry and to continue with Paxil 10 mg daily.  Also order written for Zyprexa 5 mg IM every 8 hours as needed agitation.  To observe on current regimen.    Aphasia -  August 12 - trial of Namenda  August 16-continue to observe on Namenda 5 mg daily for her aphasia and may possibly titrate up next week  August 20-titrate up on Namenda to 5 mg twice a day  August 22-discontinued Namenda.  Do not feel related to her anxiety/restlessness as it was present prior to starting but discontinued to avoid any additional psychoactive medications that might add to it.  Had not seen any " improvement in her aphasia with the very brief trial.    Dysphagia-Cortrak feeding tube discontinued on July 31 and started on puréed/honey thick liquid diet with strategies  August 7-advance to fork mash nectar thick liquid diet, consistent carbohydrate.  August 14 - repeat VFSS to assess for advancing to thin liquids  August 15-Video fluoroscopic swallow study today-mechanical soft, no mixed consistency, nectar thick liquid, water protocol between meals, no straws.    History of multiple bilateral strokes and left central retinal artery occlusion    History of left greater than right internal carotid artery stenosis-status post left internal carotid artery stent July 17, 2019    History of hypertension -  Hyzaar 100-25. August 4 - Bystolic increased to 20 mg daily to 20 mg bid.  August 8 - BP still runs high. On discharge in May 2019 was on Hyzaar 100-25 mg daily, Bystolic 20 mg daily, amlodipine 10 mg daily, Hydralazine 50 mg q 8 hours.  Will add Hydralazine initially 10 mg po q 8 hours and titrate up as needed.   August 9-systolic blood pressure elevated last night and early this morning but better this afternoon at 122-130.  Continue to follow recent addition of hydralazine and titrate up as needed  August 10-systolic blood pressure 175 this afternoon, range otherwise 122-142.  Will titrate up on hydralazine to 20 mg every 8 hours.  August 11-hypertension overall appears improved.  Will titrate up further to 25 mg every 8 hours.  August 12 - add amlodipine 5 mg daily.   August 14 - titrate up on hydralazine to 50 mg q 8 hours  August 15-blood pressure improved this afternoon with systolic blood pressure in the 120s-follow on current regimen  August 16-blood pressure range 110////59-will continue to monitor on present regimen  August 19 - /74 early AM, later 136/71 - increase amlodipine to 5 mg bid    History of diabetes mellitus -Lantus/glipizide (home medication metformin 1000 mg twice  daily and glipizide 10 mg twice daily)  August 1-blood sugars were low yesterday afternoon after tube feeds discontinued.  Held glipizide last night and this morning and Lantus last night.  Blood sugar elevated at noontime today.  Will receive resume glipizide at a lower dose of 5 mg twice a day with meals.  Continue sliding scale insulin coverage.  She also is on metformin at home.  August 5-add back metformin initially at 500 mg twice a day and continue glipizide 5 mg twice a day, both one half of previous home dose, titrate up as needed.  August 7-titrate up metformin to 850 mg twice a day.  Add consistent carbohydrate restriction to diet.  August 9-increase metformin to 1000 g twice a day.  August 10-blood glucose 340 last evening but 150-114-178 so far today  August 11-continue to follow blood sugar pattern and may increase glipizide further as current dose glipizide 5 mg twice a day along with metformin 1000 mg twice a day  August 14 - blood glucose  past 24 hours - monitor pattern.  August 15-blood glucose [phone]--[phone]-continue to follow pattern.  Will change sliding scale insulin coverage to Humalog at a lower dose.  She was started on the regular insulin sliding scale when she was on tube feeds.  August 16 - recent blood glucose -746-138  August 19 - recent blood glucose [phone] - increase glipizide to 7.5 mg bid  August 21 - refused blood glucose check today. Will decrease glipizide back to 5 mg bid to avoid potential for hypoglycemia until allow blood glucose check.     Stroke prophylaxis-aspirin/Eliquis/atorvastatin    Anemia-August 1-hemoglobin 7.4.  Most recent check on July 23 HGB 9.3.  Will recheck hemoglobin this afternoon.  Hemoccult stool.  Iron studies.  Reticulocyte count.  Recheck CBC in the a.m.  Added Protonix.  Patient is on aspirin and Eliquis.  With recent stroke  will look to transfuse packed red blood cell.  August 2-hemoglobin improved 9.0-1 unit packed  red blood cells.  Elevated reticulocyte count in response to anemia.  Nursing describes dark stool.  Patient discussed with gastroenterology.  At this point unable to do endoscopy as on Eliquis and aspirin.  Will treat symptomatically for now with increasing proton pump inhibitor and transfusing as needed.  August 5-anemia improved-hemoglobin 9.8  August 16-hemoglobin unchanged 9.8    DVT prophylaxis-SCDs/anticoagulation    Impulsivity-   Nursing to do re-orientation with the patient.  Room close to the nursing station.    Endocrine-vitamin D deficiency-ergocalciferol 50,000 units weekly x8 weeks added. Vitamin B12 level and TSH checked in late May unremarkable    Urinary tract infection-August 20 -urine culture with E. coli ESBL.  Given her aphasia not able to obtain information regarding dysuria.  As the urinalysis was ordered as part of work-up for increased restlessness this weekend, would have to treat as symptomatic although could be asymptomatic bacteriuria.  Will place on ertapenem 1 g IV daily for 3-5 days.     Impaired cognition/impaired language, impaired swallow, impaired activity daily living, impaired mobility     Now admit for comprehensive acute inpatient rehabilitation .  This would be an interdisciplinary program with physical therapy 1 hour,  occupational therapy 1 hour, and speech therapy 1 hour, 5 days a week.  Rehabilitation nursing for carryover, monitoring of cardiopulmonary and neurologic   status, bowel and bladder, and skin  Ongoing physician follow-up.  Weekly team conferences.  Goals are indeterminant.   Rehabilitation prognosis determined.  Medical prognosis determined.  Estimated length of stay is indeterminate.    TEAM CONF - AUGUST 6- TRANFERS CTG. GAIT UP  FEET CTG-MIN ASSIST. UBD MIN. LBD MOD. BATH MOD. SEVERE APHASIA. INCREASED RESISTANCE TO PARTICIPATE AT TIMES.   PUREE/HTL.  GOOD PO INTAKE. ADJUSTING MEDS FOR DIABETES MELLITUS.  BLADDER CONTINENT/INCONTIENT.   ELOS- 2  WEEKS.     TEAM JEFFREY - AUGUST 13 - DID GREAT WITH PT ON Saturday, FOLLOWING COMMANDS AND BATTING A BALL WITH ANOTHER PATIENT. YESTERDAY, VERY FRUSTRATED AND IRRITABLE.  FRUSTRATED BY APHASIA, TRYING TO TELL STAFF SOMETHING. WILL HOLD ON TO OBJECTS, REPEAT SINGLE WORD.   BED CTG. 4 STAIRS CTG.  GAIT 220 FEET CTG-SBA NO DEVICE.  TOILET TRANSFERS CTG-MIN.  GROOMING MIN.  UBD MIN ASSIST FOR BRA, LBD CTG-MIN. BATH CTG-MIN. COORDINATION RUE BETTER.  DYSPHAGIA - IMPROVED - FORK TALA, NTL. DO NOT FEEL SHE WOULD TOLERATE E-STIM. RECEPTIVE LANGUAGE IMPROVED SLIGHTLY , POINTING TO OBJECTS. EXPRESSIVELY PERSEVERATIVE ON A SINGLE WORD, TRIES TO USE PICTURE BOARD TO COMMUNICATE. YES/NO NOT ACCURATE.  CONTINENT BOWEL AND BLADDER, TIMED VOIDS. SKIN INTACT. TYPICALLY GOOD INTAKE.  ELOS - 1.5 WEEKS    AUGUST 14 - In therapies - In OT, increased participation noted with  present   Transfers SBA/CTG  Walked from therapy gym to room without AD SBA and vc's for directions back to the room   300' outdoors on sidewalk, incline; 300', 180', 100' up on rehab unit. Pt was able to find her room when she got close to it  Bathing, dressing, grooming min assist. Toileting moderate assist.  Pt participated in using R hand to manipualte round pegs into peg board by color with MIN difficutly once pt caught on to the task. with 3D block recreation with R hand with 100% color match, 80% accuracy with block recreation  With SLP, patient was not able to effectively communicate her wants/needs but refused to go to therapy office by planting her heels while rolling in wc down the [place]; tx session completed in her room     August 15-The patient was reviewed with .  Discussed adding on an antidepressant with anxiolytic properties -Paxil-as it appears frustration from her aphasia with associated anxiety with the frustration affects her performance.  We will plan on starting Paxil 10 mg daily tomorrow and monitor response.  Reviewed with the  patient's  that would not add a short acting anxiolytic (such as a benzodiazepine or Seroquel) as it may affect her cognitive performance.    August 16-Patient was reviewed with her daughter today including rehabilitation goals, discharge planning, and recent medication adjustment to try to help with her anxiety/frustration with her aphasia.  Reviewed with the daughter that on discussion with the team is felt that she would do better with her functional status/aphasia/anxiety if able to be discharged to home environment with more frequent interactions but if that is not feasible with the need to look at subacute options.    TEAM CONF - AUGUST 20 - GAIT CTG- FEET. NO DEVICE, WALKS TO AND FROM GYM.  ADLS CTG WITH CUING.  PLAYED ENTIRE GAME OF CHECKERS YESTERDAY. VARIABLE PERFORMANCE WITH SPEECH THERAPY 20-90% MATCHING WORDS TO PICTURES.    MECH SOFT, GROUND MEAT, NECTAR THICK LIQUIDS.   TYPICALLY CONTINENT BLADDER BUT INCONTINENT BOWEL. DID FINE LAST EVENING PER SISTER AND DID NOT TRY TO GET UP- WILL DISCONTINUE SITTER. IN ROOM CLOSE TO NURSING STATION.   BEST- FAMILY CONF LAST WEEK- WILL WORK TOWARD SUBACUTE PLACEMENT     August 23-upper body dressing set up, lower body dressing min assist.  Grooming supervision.  Transfers standby assist.  Ambulated 220 feet without a device contact-guard standby assist.  She spent in therapy this morning but not with physical therapy this afternoon     Barrier to discharge includes  Impaired cognitive-linguistic skills - work on receptive and expressive language and cognition.       Oj Avila MD  08/25/19  4:08 PM    Time:            Electronically signed by Oj Avila MD at 8/25/2019  4:09 PM     Oj Avila MD at 8/24/2019  5:14 PM             LOS: 24 days   Patient Care Team:  Eric Matta MD as PCP - General (General Practice)    Chief Complaint:     Status post left CVA with aphasia and spastic right hemiparesis  Dysphagia- History of  multiple bilateral strokes and left central retinal artery occlusion  History of left greater than right internal carotid artery stenosis-status post left internal carotid artery stent July 17, 2019  History of hypertension  History of diabetes mellitus  Impulsivity-room close to nursing station-bed alarm  Anemia  Vitamin D deficiency        Subjective     History of Present Illness    Subjective  Patient continues with aphasia.  Continues with improved strength on the right side arm more than leg.  Patient seen in bed this afternoon.  Did not identify any new complaint but limited by her aphasia.  Still appears frustrated by her aphasia.      History taken from: patient chart RN    Objective     Vital Signs  Temp:  [98.1 °F (36.7 °C)-98.4 °F (36.9 °C)] 98.1 °F (36.7 °C)  Heart Rate:  [67-72] 72  Resp:  [18] 18  BP: (122-153)/(63-68) 153/65    Objective  Physical Exam  MENTAL STATUS -  AWAKE / ALERT.  Anxious but less so than yesterday  HEENT-    LUNGS - normal respirations.   Clear to auscultation  HEART- regular  ABD -   normoactive bowel sounds.  Soft nontender.  EXT - no edema  NEURO -alert.  Aphasia.      MOTOR EXAM -takes resistance in the bilateral upper extremities and lower extremities left more then right         Results Review:     I reviewed the patient's new clinical results.     CT HEAD - AUGUST 11, 2019  FINDINGS:  Old appearing lacunar type infarcts are again noted about the  right thalamus and basal ganglia regions. There are stable areas of  encephalomalacia in the left parietal and occipital lobes medially.  Generalized atrophy. There are moderately extensive scattered areas of  decreased density in the white matter likely related to chronic ischemic  gliotic changes.    No hydrocephalus.    Glucose   Date/Time Value Ref Range Status   08/24/2019 1638 103 70 - 130 mg/dL Final   08/24/2019 1135 150 (H) 70 - 130 mg/dL Final   08/24/2019 0612 117 70 - 130 mg/dL Final   08/23/2019 2031 138 (H) 70 -  130 mg/dL Final   08/23/2019 1622 111 70 - 130 mg/dL Final   08/23/2019 1106 162 (H) 70 - 130 mg/dL Final   08/23/2019 0607 106 70 - 130 mg/dL Final   08/22/2019 2113 90 70 - 130 mg/dL Final     Results from last 7 days   Lab Units 08/23/19  0631 08/21/19  0605 08/19/19  0739   WBC 10*3/mm3 4.21 4.86 5.32   HEMOGLOBIN g/dL 9.5* 9.8* 10.3*   HEMATOCRIT % 30.6* 30.6* 33.7*   PLATELETS 10*3/mm3 306 324 382       Ref. Range 5/22/2019 05:44 5/22/2019 11:34 7/9/2019 08:25 7/18/2019 04:49 7/19/2019 05:05 7/23/2019 05:56 8/1/2019 06:48   Hemoglobin Latest Ref Range: 12.0 - 15.9 g/dL 10.8 (L)  10.6 (L) 8.5 (L) 9.7 (L) 9.3 (L) 7.4 (L)   Hematocrit Latest Ref Range: 34.0 - 46.6 % 35.1  33.4 (L) 26.2 (L) 29.9 (L) 28.6 (L) 23.6 (L)     Results from last 7 days   Lab Units 08/23/19  0631 08/21/19  0605 08/19/19  0739   SODIUM mmol/L 137 138 137   POTASSIUM mmol/L 3.6 3.5 3.8   CHLORIDE mmol/L 101 99 101   CO2 mmol/L 28.4 28.1 27.3   BUN mg/dL 17 19 12   CREATININE mg/dL 0.73 0.65 0.63   CALCIUM mg/dL 9.1 8.8 8.9   GLUCOSE mg/dL 108* 117* 168*         Ref. Range 8/1/2019 06:47   25 Hydroxy, Vitamin D Latest Ref Range: 30.0 - 100.0 ng/ml 21.8 (L)       Ref. Range 8/1/2019 06:47   Total Cholesterol Latest Ref Range: 0 - 200 mg/dL 105   HDL Cholesterol Latest Ref Range: 40 - 60 mg/dL 40   LDL Cholesterol  Latest Ref Range: 0 - 100 mg/dL 57   VLDL Cholesterol Latest Ref Range: 5 - 40 mg/dL 8   Triglycerides Latest Ref Range: 0 - 150 mg/dL 40   Medication Review: done  Scheduled Meds:    amLODIPine 5 mg Oral BID - RT   apixaban 5 mg Oral Q12H   aspirin 325 mg Oral Daily   atorvastatin 80 mg Oral Nightly   brimonidine 1 drop Both Eyes BID   dorzolamide 1 drop Both Eyes BID   glipiZIDE 5 mg Oral BID AC   hydrALAZINE 50 mg Oral Q8H   insulin lispro 0-7 Units Subcutaneous 4x Daily With Meals & Nightly   lansoprazole 30 mg Oral BID AC   losartan-HCTZ (HYZAAR) 100-12.5 combo dose  Oral Daily   metFORMIN 1,000 mg Oral BID With Meals  "perseverates on \"no\".  Still with some anxiety related to her aphasia but less than yesterday.  Seen by psychiatry and to continue with Paxil 10 mg daily.  Also order written for Zyprexa 5 mg IM every 8 hours as needed agitation.  To observe on current regimen.    Aphasia -  August 12 - trial of Namenda  August 16-continue to observe on Namenda 5 mg daily for her aphasia and may possibly titrate up next week  August 20-titrate up on Namenda to 5 mg twice a day  August 22-discontinued Namenda.  Do not feel related to her anxiety/restlessness as it was present prior to starting but discontinued to avoid any additional psychoactive medications that might add to it.  Had not seen any improvement in her aphasia with the very brief trial.    Dysphagia-Cortrak feeding tube discontinued on July 31 and started on puréed/honey thick liquid diet with strategies  August 7-advance to fork mash nectar thick liquid diet, consistent carbohydrate.  August 14 - repeat VFSS to assess for advancing to thin liquids  August 15-Video fluoroscopic swallow study today-mechanical soft, no mixed consistency, nectar thick liquid, water protocol between meals, no straws.    History of multiple bilateral strokes and left central retinal artery occlusion    History of left greater than right internal carotid artery stenosis-status post left internal carotid artery stent July 17, 2019    History of hypertension -  Hyzaar 100-25. August 4 - Bystolic increased to 20 mg daily to 20 mg bid.  August 8 - BP still runs high. On discharge in May 2019 was on Hyzaar 100-25 mg daily, Bystolic 20 mg daily, amlodipine 10 mg daily, Hydralazine 50 mg q 8 hours.  Will add Hydralazine initially 10 mg po q 8 hours and titrate up as needed.   August 9-systolic blood pressure elevated last night and early this morning but better this afternoon at 122-130.  Continue to follow recent addition of hydralazine and titrate up as needed  August 10-systolic blood pressure " 175 this afternoon, range otherwise 122-142.  Will titrate up on hydralazine to 20 mg every 8 hours.  August 11-hypertension overall appears improved.  Will titrate up further to 25 mg every 8 hours.  August 12 - add amlodipine 5 mg daily.   August 14 - titrate up on hydralazine to 50 mg q 8 hours  August 15-blood pressure improved this afternoon with systolic blood pressure in the 120s-follow on current regimen  August 16-blood pressure range 110////59-will continue to monitor on present regimen  August 19 - /74 early AM, later 136/71 - increase amlodipine to 5 mg bid    History of diabetes mellitus -Lantus/glipizide (home medication metformin 1000 mg twice daily and glipizide 10 mg twice daily)  August 1-blood sugars were low yesterday afternoon after tube feeds discontinued.  Held glipizide last night and this morning and Lantus last night.  Blood sugar elevated at noontime today.  Will receive resume glipizide at a lower dose of 5 mg twice a day with meals.  Continue sliding scale insulin coverage.  She also is on metformin at home.  August 5-add back metformin initially at 500 mg twice a day and continue glipizide 5 mg twice a day, both one half of previous home dose, titrate up as needed.  August 7-titrate up metformin to 850 mg twice a day.  Add consistent carbohydrate restriction to diet.  August 9-increase metformin to 1000 g twice a day.  August 10-blood glucose 340 last evening but 150-114-178 so far today  August 11-continue to follow blood sugar pattern and may increase glipizide further as current dose glipizide 5 mg twice a day along with metformin 1000 mg twice a day  August 14 - blood glucose  past 24 hours - monitor pattern.  August 15-blood glucose [phone]--909-66-continue to follow pattern.  Will change sliding scale insulin coverage to Humalog at a lower dose.  She was started on the regular insulin sliding scale when she was on tube feeds.  August 16 -  recent blood glucose -141-138  August 19 - recent blood glucose [phone] - increase glipizide to 7.5 mg bid  August 21 - refused blood glucose check today. Will decrease glipizide back to 5 mg bid to avoid potential for hypoglycemia until allow blood glucose check.     Stroke prophylaxis-aspirin/Eliquis/atorvastatin    Anemia-August 1-hemoglobin 7.4.  Most recent check on July 23 HGB 9.3.  Will recheck hemoglobin this afternoon.  Hemoccult stool.  Iron studies.  Reticulocyte count.  Recheck CBC in the a.m.  Added Protonix.  Patient is on aspirin and Eliquis.  With recent stroke  will look to transfuse packed red blood cell.  August 2-hemoglobin improved 9.0-1 unit packed red blood cells.  Elevated reticulocyte count in response to anemia.  Nursing describes dark stool.  Patient discussed with gastroenterology.  At this point unable to do endoscopy as on Eliquis and aspirin.  Will treat symptomatically for now with increasing proton pump inhibitor and transfusing as needed.  August 5-anemia improved-hemoglobin 9.8  August 16-hemoglobin unchanged 9.8    DVT prophylaxis-SCDs/anticoagulation    Impulsivity-   Nursing to do re-orientation with the patient.  Room close to the nursing station.    Endocrine-vitamin D deficiency-ergocalciferol 50,000 units weekly x8 weeks added. Vitamin B12 level and TSH checked in late May unremarkable    Urinary tract infection-August 20 -urine culture with E. coli ESBL.  Given her aphasia not able to obtain information regarding dysuria.  As the urinalysis was ordered as part of work-up for increased restlessness this weekend, would have to treat as symptomatic although could be asymptomatic bacteriuria.  Will place on ertapenem 1 g IV daily for 3-5 days.     Impaired cognition/impaired language, impaired swallow, impaired activity daily living, impaired mobility     Now admit for comprehensive acute inpatient rehabilitation .  This would be an interdisciplinary program with  physical therapy 1 hour,  occupational therapy 1 hour, and speech therapy 1 hour, 5 days a week.  Rehabilitation nursing for carryover, monitoring of cardiopulmonary and neurologic   status, bowel and bladder, and skin  Ongoing physician follow-up.  Weekly team conferences.  Goals are indeterminant.   Rehabilitation prognosis determined.  Medical prognosis determined.  Estimated length of stay is indeterminate.    TEAM CONF - AUGUST 6- TRANFERS CTG. GAIT UP  FEET CTG-MIN ASSIST. UBD MIN. LBD MOD. BATH MOD. SEVERE APHASIA. INCREASED RESISTANCE TO PARTICIPATE AT TIMES.   PUREE/HTL.  GOOD PO INTAKE. ADJUSTING MEDS FOR DIABETES MELLITUS.  BLADDER CONTINENT/INCONTIENT.   ELOS- 2 WEEKS.     TEAM CONF - AUGUST 13 - DID GREAT WITH PT ON Saturday, FOLLOWING COMMANDS AND BATTING A BALL WITH ANOTHER PATIENT. YESTERDAY, VERY FRUSTRATED AND IRRITABLE.  FRUSTRATED BY APHASIA, TRYING TO TELL STAFF SOMETHING. WILL HOLD ON TO OBJECTS, REPEAT SINGLE WORD.   BED CTG. 4 STAIRS CTG.  GAIT 220 FEET CTG-SBA NO DEVICE.  TOILET TRANSFERS CTG-MIN.  GROOMING MIN.  UBD MIN ASSIST FOR BRA, LBD CTG-MIN. BATH CTG-MIN. COORDINATION RUE BETTER.  DYSPHAGIA - IMPROVED - FORK MASH, NTL. DO NOT FEEL SHE WOULD TOLERATE E-STIM. RECEPTIVE LANGUAGE IMPROVED SLIGHTLY , POINTING TO OBJECTS. EXPRESSIVELY PERSEVERATIVE ON A SINGLE WORD, TRIES TO USE PICTURE BOARD TO COMMUNICATE. YES/NO NOT ACCURATE.  CONTINENT BOWEL AND BLADDER, TIMED VOIDS. SKIN INTACT. TYPICALLY GOOD INTAKE.  ELOS - 1.5 WEEKS    AUGUST 14 - In therapies - In OT, increased participation noted with  present   Transfers SBA/CTG  Walked from therapy gym to room without AD SBA and vc's for directions back to the room   300' outdoors on sidewalk, incline; 300', 180', 100' up on rehab unit. Pt was able to find her room when she got close to it  Bathing, dressing, grooming min assist. Toileting moderate assist.  Pt participated in using R hand to manipualte round pegs into peg board by  color with MIN difficutly once pt caught on to the task. with 3D block recreation with R hand with 100% color match, 80% accuracy with block recreation  With SLP, patient was not able to effectively communicate her wants/needs but refused to go to therapy office by planting her heels while rolling in wc down the [place]; tx session completed in her room     August 15-The patient was reviewed with .  Discussed adding on an antidepressant with anxiolytic properties -Paxil-as it appears frustration from her aphasia with associated anxiety with the frustration affects her performance.  We will plan on starting Paxil 10 mg daily tomorrow and monitor response.  Reviewed with the patient's  that would not add a short acting anxiolytic (such as a benzodiazepine or Seroquel) as it may affect her cognitive performance.    August 16-Patient was reviewed with her daughter today including rehabilitation goals, discharge planning, and recent medication adjustment to try to help with her anxiety/frustration with her aphasia.  Reviewed with the daughter that on discussion with the team is felt that she would do better with her functional status/aphasia/anxiety if able to be discharged to home environment with more frequent interactions but if that is not feasible with the need to look at subacute options.    TEAM CONF - AUGUST 20 - GAIT CTG- FEET. NO DEVICE, WALKS TO AND FROM GYM.  ADLS CTG WITH CUING.  PLAYED ENTIRE GAME OF CHECKERS YESTERDAY. VARIABLE PERFORMANCE WITH SPEECH THERAPY 20-90% MATCHING WORDS TO PICTURES.    MECH SOFT, GROUND MEAT, NECTAR THICK LIQUIDS.   TYPICALLY CONTINENT BLADDER BUT INCONTINENT BOWEL. DID FINE LAST EVENING PER SISTER AND DID NOT TRY TO GET UP- WILL DISCONTINUE SITTER. IN ROOM CLOSE TO NURSING STATION.   BEST- FAMILY JE[facility]REY LAST WEEK- WILL WORK TOWARD SUBACUTE PLACEMENT     August 23-upper body dressing set up, lower body dressing min assist.  Grooming supervision.  Transfers  standby assist.  Ambulated 220 feet without a device contact-guard standby assist.  She spent in therapy this morning but not with physical therapy this afternoon     Barrier to discharge includes  Impaired cognitive-linguistic skills - work on receptive and expressive language and cognition.       Oj Avila MD  08/24/19  5:14 PM    Time:            Electronically signed by Oj Avila MD at 8/24/2019  5:16 PM     Jordin Harris MD at 8/24/2019 11:24 AM             LOS: 24 days   Patient Care Team:  Eric Matta MD as PCP - General (General Practice)    Chief Complaint: Postop carotid endarterectomy    Subjective     This patient continues to perseverate with just no and yes.  She is very agitated but it is unclear how much she can actually understand.  She obviously has a significant expressive a fascia.    Interval History:     History taken from: patient chart family RN    Objective      The patient has good movement in all extremities.  Her biggest problem is the aphasia.    Vital Signs  Temp:  [98 °F (36.7 °C)-98.4 °F (36.9 °C)] 98.4 °F (36.9 °C)  Heart Rate:  [65-70] 70  Resp:  [18] 18  BP: (122-131)/(58-68) 122/63       Results Review:     I reviewed the patient's new clinical results.  She is afebrile and her labs look okay.      Assessment/Plan       Stroke (cerebrum) (CMS/HCC)      I had a very long discussion with the patient's  and also with her daughter.  I talked to her daughter on the phone.  I answered a number of questions including her overall prognosis.  I explained that I am pretty confident she will improve further from here but it is impossible to know the endpoint.  They seemed understand all this fairly well.      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  Continues with improved strength on the right side.  Patient seen in physical therapy this morning.  Did not identify any new complaint but limited by her aphasia.  Still appears frustrated by her aphasia.      History taken from: patient chart RN    Objective     Vital Signs  Temp:  [98 °F (36.7 °C)-98.5 °F (36.9 °C)] 98 °F (36.7 °C)  Heart Rate:  [65-67] 65  Resp:  [16-18] 18  BP: (112-146)/(58-68) 124/58    Objective  Physical Exam  MENTAL STATUS -  AWAKE / ALERT.  Anxious but less so than yesterday  HEENT-    LUNGS - normal respirations.   Clear to auscultation  HEART- regular  ABD -   normoactive bowel sounds.  Soft nontender.  EXT - no edema  NEURO -alert.  Aphasia.      MOTOR EXAM -takes resistance in the bilateral upper extremities and lower extremities         Results Review:     I reviewed the patient's new clinical results.     CT HEAD - AUGUST 11, 2019  FINDINGS:  Old appearing lacunar type infarcts are again noted about the  right thalamus and basal ganglia regions. There are stable areas of  encephalomalacia in the left parietal and occipital lobes medially.  Generalized atrophy. There are moderately extensive scattered areas of  decreased density in the white matter likely related to chronic ischemic  gliotic changes.    No hydrocephalus.    Glucose   Date/Time Value Ref Range Status   08/23/2019 1106 162 (H) 70 - 130 mg/dL Final   08/23/2019 0607 106 70 -  130 mg/dL Final   08/22/2019 2113 90 70 - 130 mg/dL Final   08/22/2019 1619 155 (H) 70 - 130 mg/dL Final   08/22/2019 1111 175 (H) 70 - 130 mg/dL Final   08/22/2019 0721 184 (H) 70 - 130 mg/dL Final   08/21/2019 2029 149 (H) 70 - 130 mg/dL Final   08/21/2019 0729 153 (H) 70 - 130 mg/dL Final     Results from last 7 days   Lab Units 08/23/19  0631 08/21/19  0605 08/19/19  0739   WBC 10*3/mm3 4.21 4.86 5.32   HEMOGLOBIN g/dL 9.5* 9.8* 10.3*   HEMATOCRIT % 30.6* 30.6* 33.7*   PLATELETS 10*3/mm3 306 324 382       Ref. Range 5/22/2019 05:44 5/22/2019 11:34 7/9/2019 08:25 7/18/2019 04:49 7/19/2019 05:05 7/23/2019 05:56 8/1/2019 06:48   Hemoglobin Latest Ref Range: 12.0 - 15.9 g/dL 10.8 (L)  10.6 (L) 8.5 (L) 9.7 (L) 9.3 (L) 7.4 (L)   Hematocrit Latest Ref Range: 34.0 - 46.6 % 35.1  33.4 (L) 26.2 (L) 29.9 (L) 28.6 (L) 23.6 (L)     Results from last 7 days   Lab Units 08/23/19  0631 08/21/19  0605 08/19/19  0739   SODIUM mmol/L 137 138 137   POTASSIUM mmol/L 3.6 3.5 3.8   CHLORIDE mmol/L 101 99 101   CO2 mmol/L 28.4 28.1 27.3   BUN mg/dL 17 19 12   CREATININE mg/dL 0.73 0.65 0.63   CALCIUM mg/dL 9.1 8.8 8.9   GLUCOSE mg/dL 108* 117* 168*         Ref. Range 8/1/2019 06:47   25 Hydroxy, Vitamin D Latest Ref Range: 30.0 - 100.0 ng/ml 21.8 (L)       Ref. Range 8/1/2019 06:47   Total Cholesterol Latest Ref Range: 0 - 200 mg/dL 105   HDL Cholesterol Latest Ref Range: 40 - 60 mg/dL 40   LDL Cholesterol  Latest Ref Range: 0 - 100 mg/dL 57   VLDL Cholesterol Latest Ref Range: 5 - 40 mg/dL 8   Triglycerides Latest Ref Range: 0 - 150 mg/dL 40   Medication Review: done  Scheduled Meds:    amLODIPine 5 mg Oral BID - RT   apixaban 5 mg Oral Q12H   aspirin 325 mg Oral Daily   atorvastatin 80 mg Oral Nightly   brimonidine 1 drop Both Eyes BID   dorzolamide 1 drop Both Eyes BID   glipiZIDE 5 mg Oral BID AC   hydrALAZINE 50 mg Oral Q8H   insulin lispro 0-7 Units Subcutaneous 4x Daily With Meals & Nightly   lansoprazole 30 mg Oral BID AC    losartan-HCTZ (HYZAAR) 100-12.5 combo dose  Oral Daily   metFORMIN 1,000 mg Oral BID With Meals   nebivolol 20 mg Oral BID   nystatin 5 mL Swish & Spit 4x Daily   PARoxetine 10 mg Oral Daily   potassium chloride 20 mEq Oral Daily With Breakfast   vitamin D 50,000 Units Oral Q7 Days     Continuous Infusions:   PRN Meds:.•  acetaminophen  •  aluminum-magnesium hydroxide-simethicone  •  dextrose  •  dextrose  •  glucagon (human recombinant)  •  nitroglycerin  •  OLANZapine      Assessment/Plan       Stroke (cerebrum) (CMS/HCC)      Assessment & Plan  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  If add Namenda, will probably discontinue amantadine as she continues alert.  August 11-she has some increased irritability at times.  This may be related to the underlying stroke deficits itself.  She does not appear to have any dysuria, no odor to her urine.  Staff reports that for the most part she is eating okay.  Will check a follow-up CT of the head to assess for any interval visual changes.  She is on aspirin and Eliquis for stroke prophylaxis.  She had noticeably improved alertness when amantadine was started.  She is readily alert now.  This may be related to natural recovery in terms of her level of arousal at this point.  Current plan is to discontinue the amantadine to see if that helps with her irritability and start  on Namenda for aphasia.  August 12 -  Patient with increased restlessness at times.   Continues aphasia. Not able to identify any complaint.  Appears anxious today. No change on CT head yesterday. Started on Namenda for aphasia on 8/12/19.   August 13- will continue to monitor on recent med adjustments   August 15-She continues with expressive language deficits.    Again appears anxious related to her aphasia and  "input.  August 22-patient more cooperative today, taking medications and will participate in therapies.  Still perseverates on \"no\".  Still with some anxiety related to her aphasia but less than yesterday.  Seen by psychiatry and to continue with Paxil 10 mg daily.  Also order written for Zyprexa 5 mg IM every 8 hours as needed agitation.  To observe on current regimen.    Aphasia -  August 12 - trial of Namenda  August 16-continue to observe on Namenda 5 mg daily for her aphasia and may possibly titrate up next week  August 20-titrate up on Namenda to 5 mg twice a day  August 22-discontinued Namenda.  Do not feel related to her anxiety/restlessness as it was present prior to starting but discontinued to avoid any additional psychoactive medications that might add to it.  Had not seen any improvement in her aphasia with the very brief trial.    Dysphagia-Cortrak feeding tube discontinued on July 31 and started on puréed/honey thick liquid diet with strategies  August 7-advance to fork mash nectar thick liquid diet, consistent carbohydrate.  August 14 - repeat VFSS to assess for advancing to thin liquids  August 15-Video fluoroscopic swallow study today-mechanical soft, no mixed consistency, nectar thick liquid, water protocol between meals, no straws.    History of multiple bilateral strokes and left central retinal artery occlusion    History of left greater than right internal carotid artery stenosis-status post left internal carotid artery stent July 17, 2019    History of hypertension -  Hyzaar 100-25. August 4 - Bystolic increased to 20 mg daily to 20 mg bid.  August 8 - BP still runs high. On discharge in May 2019 was on Hyzaar 100-25 mg daily, Bystolic 20 mg daily, amlodipine 10 mg daily, Hydralazine 50 mg q 8 hours.  Will add Hydralazine initially 10 mg po q 8 hours and titrate up as needed.   August 9-systolic blood pressure elevated last night and early this morning but better this afternoon at 122-130.  " Continue to follow recent addition of hydralazine and titrate up as needed  August 10-systolic blood pressure 175 this afternoon, range otherwise 122-142.  Will titrate up on hydralazine to 20 mg every 8 hours.  August 11-hypertension overall appears improved.  Will titrate up further to 25 mg every 8 hours.  August 12 - add amlodipine 5 mg daily.   August 14 - titrate up on hydralazine to 50 mg q 8 hours  August 15-blood pressure improved this afternoon with systolic blood pressure in the 120s-follow on current regimen  August 16-blood pressure range 110////59-will continue to monitor on present regimen  August 19 - /74 early AM, later 136/71 - increase amlodipine to 5 mg bid    History of diabetes mellitus -Lantus/glipizide (home medication metformin 1000 mg twice daily and glipizide 10 mg twice daily)  August 1-blood sugars were low yesterday afternoon after tube feeds discontinued.  Held glipizide last night and this morning and Lantus last night.  Blood sugar elevated at noontime today.  Will receive resume glipizide at a lower dose of 5 mg twice a day with meals.  Continue sliding scale insulin coverage.  She also is on metformin at home.  August 5-add back metformin initially at 500 mg twice a day and continue glipizide 5 mg twice a day, both one half of previous home dose, titrate up as needed.  August 7-titrate up metformin to 850 mg twice a day.  Add consistent carbohydrate restriction to diet.  August 9-increase metformin to 1000 g twice a day.  August 10-blood glucose 340 last evening but 150-114-178 so far today  August 11-continue to follow blood sugar pattern and may increase glipizide further as current dose glipizide 5 mg twice a day along with metformin 1000 mg twice a day  August 14 - blood glucose  past 24 hours - monitor pattern.  August 15-blood glucose [phone]--[phone]-continue to follow pattern.  Will change sliding scale insulin coverage to Humalog at a  lower dose.  She was started on the regular insulin sliding scale when she was on tube feeds.  August 16 - recent blood glucose -088-138  August 19 - recent blood glucose [phone] - increase glipizide to 7.5 mg bid  August 21 - refused blood glucose check today. Will decrease glipizide back to 5 mg bid to avoid potential for hypoglycemia until allow blood glucose check.     Stroke prophylaxis-aspirin/Eliquis/atorvastatin    Anemia-August 1-hemoglobin 7.4.  Most recent check on July 23 HGB 9.3.  Will recheck hemoglobin this afternoon.  Hemoccult stool.  Iron studies.  Reticulocyte count.  Recheck CBC in the a.m.  Added Protonix.  Patient is on aspirin and Eliquis.  With recent stroke  will look to transfuse packed red blood cell.  August 2-hemoglobin improved 9.0-1 unit packed red blood cells.  Elevated reticulocyte count in response to anemia.  Nursing describes dark stool.  Patient discussed with gastroenterology.  At this point unable to do endoscopy as on Eliquis and aspirin.  Will treat symptomatically for now with increasing proton pump inhibitor and transfusing as needed.  August 5-anemia improved-hemoglobin 9.8  August 16-hemoglobin unchanged 9.8    DVT prophylaxis-SCDs/anticoagulation    Impulsivity-   Nursing to do re-orientation with the patient.  Room close to the nursing station.    Endocrine-vitamin D deficiency-ergocalciferol 50,000 units weekly x8 weeks added. Vitamin B12 level and TSH checked in late May unremarkable    Urinary tract infection-August 20 -urine culture with E. coli ESBL.  Given her aphasia not able to obtain information regarding dysuria.  As the urinalysis was ordered as part of work-up for increased restlessness this weekend, would have to treat as symptomatic although could be asymptomatic bacteriuria.  Will place on ertapenem 1 g IV daily for 3-5 days.     Impaired cognition/impaired language, impaired swallow, impaired activity daily living, impaired mobility     Now  admit for comprehensive acute inpatient rehabilitation .  This would be an interdisciplinary program with physical therapy 1 hour,  occupational therapy 1 hour, and speech therapy 1 hour, 5 days a week.  Rehabilitation nursing for carryover, monitoring of cardiopulmonary and neurologic   status, bowel and bladder, and skin  Ongoing physician follow-up.  Weekly team conferences.  Goals are indeterminant.   Rehabilitation prognosis determined.  Medical prognosis determined.  Estimated length of stay is indeterminate.    TEAM CONF - AUGUST 6- TRANFERS CTG. GAIT UP  FEET CTG-MIN ASSIST. UBD MIN. LBD MOD. BATH MOD. SEVERE APHASIA. INCREASED RESISTANCE TO PARTICIPATE AT TIMES.   PUREE/HTL.  GOOD PO INTAKE. ADJUSTING MEDS FOR DIABETES MELLITUS.  BLADDER CONTINENT/INCONTIENT.   ELOS- 2 WEEKS.     TEAM CONF - AUGUST 13 - DID GREAT WITH PT ON Saturday, FOLLOWING COMMANDS AND BATTING A BALL WITH ANOTHER PATIENT. YESTERDAY, VERY FRUSTRATED AND IRRITABLE.  FRUSTRATED BY APHASIA, TRYING TO TELL STAFF SOMETHING. WILL HOLD ON TO OBJECTS, REPEAT SINGLE WORD.   BED CTG. 4 STAIRS CTG.  GAIT 220 FEET CTG-SBA NO DEVICE.  TOILET TRANSFERS CTG-MIN.  GROOMING MIN.  UBD MIN ASSIST FOR BRA, LBD CTG-MIN. BATH CTG-MIN. COORDINATION RUE BETTER.  DYSPHAGIA - IMPROVED - FORK MASH, NTL. DO NOT FEEL SHE WOULD TOLERATE E-STIM. RECEPTIVE LANGUAGE IMPROVED SLIGHTLY , POINTING TO OBJECTS. EXPRESSIVELY PERSEVERATIVE ON A SINGLE WORD, TRIES TO USE PICTURE BOARD TO COMMUNICATE. YES/NO NOT ACCURATE.  CONTINENT BOWEL AND BLADDER, TIMED VOIDS. SKIN INTACT. TYPICALLY GOOD INTAKE.  ELOS - 1.5 WEEKS    AUGUST 14 - In therapies - In OT, increased participation noted with  present   Transfers SBA/CTG  Walked from therapy gym to room without AD SBA and vc's for directions back to the room   300' outdoors on sidewalk, incline; 300', 180', 100' up on rehab unit. Pt was able to find her room when she got close to it  Bathing, dressing, grooming min  assist. Toileting moderate assist.  Pt participated in using R hand to manipualte round pegs into peg board by color with MIN difficutly once pt caught on to the task. with 3D block recreation with R hand with 100% color match, 80% accuracy with block recreation  With SLP, patient was not able to effectively communicate her wants/needs but refused to go to therapy office by planting her heels while rolling in wc down the [place]; tx session completed in her room     August 15-The patient was reviewed with .  Discussed adding on an antidepressant with anxiolytic properties -Paxil-as it appears frustration from her aphasia with associated anxiety with the frustration affects her performance.  We will plan on starting Paxil 10 mg daily tomorrow and monitor response.  Reviewed with the patient's  that would not add a short acting anxiolytic (such as a benzodiazepine or Seroquel) as it may affect her cognitive performance.    August 16-Patient was reviewed with her daughter today including rehabilitation goals, discharge planning, and recent medication adjustment to try to help with her anxiety/frustration with her aphasia.  Reviewed with the daughter that on discussion with the team is felt that she would do better with her functional status/aphasia/anxiety if able to be discharged to home environment with more frequent interactions but if that is not feasible with the need to look at subacute options.    TEAM CONF - AUGUST 20 - GAIT CTG- FEET. NO DEVICE, WALKS TO AND FROM GYM.  ADLS CTG WITH CUING.  PLAYED ENTIRE GAME OF CHECKERS YESTERDAY. VARIABLE PERFORMANCE WITH SPEECH THERAPY 20-90% MATCHING WORDS TO PICTURES.    MECH SOFT, GROUND MEAT, NECTAR THICK LIQUIDS.   TYPICALLY CONTINENT BLADDER BUT INCONTINENT BOWEL. DID FINE LAST EVENING PER SISTER AND DID NOT TRY TO GET UP- WILL DISCONTINUE SITTER. IN ROOM CLOSE TO NURSING STATION.   BEST- FAMILY CONF LAST WEEK- WILL WORK TOWARD SUBACUTE PLACEMENT      August 23-upper body dressing set up, lower body dressing min assist.  Grooming supervision.  Transfers standby assist.  Ambulated 220 feet without a device contact-guard standby assist.  She spent in therapy this morning but not with physical therapy this afternoon     Barrier to discharge includes  Impaired cognitive-linguistic skills - work on receptive and expressive language and cognition.       Ajit Howard MD  08/23/19  2:56 PM    Time:            Electronically signed by Ajit Howard MD at 8/23/2019  2:58 PM

## 2019-08-30 NOTE — PAYOR COMM NOTE
"*Please note that system character limits require multiple faxes - this one contains PT and OT treatment notes*    Good morning!    AUTH # [id number]    Please consider the included clinical documentation as an appeal for coverage for acute rehab on August 27th and 28th. The patient was increasingly refusing treatment, becoming agitated and aggressive with staff, and was transferred to the behavioral health unit yesterday, 8/29. If you have any questions please do not hesitate to call.     Star Capps, RN  [phone]      Darby Workman (74 y.o. Female)     Date of Birth Social Security Number Address Home Phone MRN    1945  [id number] [address] [phone] [id number]    S[employer]ian[employer] Marital Status          [place]        Admission Date Admission Type Admitting Provider Attending Provider Department, Room/Bed    7/31/19 Elective Daniel Howard MD  [facility], [facility]/    Discharge Date Discharge Disposition Discharge Destination        8/29/2019 Home or Self Care              Attending Provider:  (none)   Allergies:  No Known Allergies    Isolation:  Contact   Infection:  ESBL E coli (08/20/19)   Code Status:  CPR    Ht:  165.1 cm (65\")   Wt:  60.9 kg (134 lb 3.2 oz)    Admission Cmt:  None   Principal Problem:  None                Active Insurance as of 7/31/2019     Primary Coverage     Payor Plan Insurance Group Employer/Plan Group    [facility] [id number]     Payor Plan Address Payor Plan Phone Number Payor Plan Fax Number Effective Dates    [address]   4/1/2018 - None Entered    [facility] [phone]       Subscriber Name Subscriber Birth Date Member ID       DANIEL WORKMAN 2/6/1946 [id number]           Secondary Coverage     Payor Plan Insurance Group Employer/Plan Group    MEDICARE MEDICARE A ONLY      Payor Plan Address Payor Plan Phone Number Payor Plan Fax Number Effective Dates    [address] " [phone]  10/1/2010 - None Entered    [facility] [address]       Subscriber Name Subscriber Birth Date Member ID       NOMAN MCDONALD 1945 [id number]                 Emergency Contacts      (Rel.) Home Phone Work Phone Mobile Phone    eder mcdonald (Daughter) [phone]    Ajit Mcdonald (Spouse) [phone] -- --               Physical Therapy Notes (last 7 days) (Notes from 08/23/19 through 08/30/19)      Progress Notes signed by Laura Foster PT at 8/23/2019  2:07 PM   Version 1 of 1       Inpatient Rehabilitation Functional Measures Assessment    Functional Measures  KATI Eating:  Branch  KATI Grooming: Branch  [facility] Bathing:  Branch  [facility] Upper Body Dressing:  Branch  [facility] Lower Body Dressing:  Branch  [facility] Toileting:  Branch    [facility] Bladder Management  Level of Assistance:  Branch  Frequency/Number of Accidents this Shift:  Branch    [facility] Bowel Management  Level of Assistance: David  Frequency/Number of Accidents this Shift: Branch    [facility] Bed/Chair/Wheelchair Transfer:  Bed/chair/wheelchair Transfer Score = 5.  Patient is supervision/set-up for transferring to and from the  bed/chair/wheelchair, requiring: Stand by assistance. No assistive devices were  required.  KATI Toilet Transfer:  Branch  [facility] Tub/Shower Transfer:  Branch    Previously Documented Mode of Locomotion at Discharge: Field  KATI Expected Mode of Locomotion at Discharge: Branch  [facility] Walk/Wheelchair:  WHEELCHAIR OBSERVATION   Activity was not observed.    WALK OBSERVATION   Walk Distance Scale = 3.  Distance walked is greater than 150 feet. Walk Score  = 4.  Patient performs 75% or more of effort and requires minimal assistance.  Incidental help/contact guard/steadying was provided. Patient walked a distance  of  220 feet. No assistive devices were required.  KATI Stairs:  Activity was not observed.    KATI Comprehension:  Branch  KATI Expression:  Branch  [facility] Social Interaction:  Branch  [facility] Problem  Solving:  Branch  KATI Memory:  Branch    Therapy Mode Minutes  Occupational Therapy: Branch  Physical Therapy: Individual: 30 minutes.  Speech Language Pathology:  Branch    Signed by: Laura Foster, PT      Electronically signed by Laura Foster, PT at 2019  2:07 PM     Laura Foster, PT at 2019  2:02 PM  Version 1 of 19 1401   Rehab Treatment   Discipline physical therapist   Reason Treatment Not Performed patient/family declined treatment  (pt wouldnt let go of covers to participate w PT. refused despite encouragement/coaxing)   Recommendation   PT - Next Appointment 19       Electronically signed by Laura Foster, PT at 2019  2:02 PM     Laura Foster, PT at 2019  2:04 PM  Version 1 of 1         Inpatient Rehabilitation - Physical Therapy Treatment Note  [facility]     Patient Name: Darby Workman  : 1945  MRN: [id number]    Today's Date: 2019                 Admit Date: 2019      Visit Dx:    No diagnosis found.    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)   • Right-sided headache   • Acute ischemic stroke (CMS/HCC)   • Embolic stroke (CMS/HCC)   • Cerebral infarction due to stenosis of left carotid artery (CMS/HCC)   • Anticoagulated by anticoagulation treatment   • Stroke (cerebrum) (CMS/HCC)       Therapy Treatment    IRF Treatment Summary     Row Name 19 1115 19 0937 19 0830       Evaluation/Treatment Time and Intent    Subjective Information  no complaints  -KB  no complaints  -LH  no complaints  -KB    Existing Precautions/Restrictions  fall communication, swallow  -KB  fall  -LH  fall commuication, swallow  -KB    Document Type  therapy note (daily note)  -KB  therapy note (daily note)  -LH  therapy note (daily note)  -KB    Mode of Treatment   speech-language pathology  -KB  individual therapy;physical therapy  -  speech-language pathology  -    Patient/Family Observations  assisted patient from bed to wc; agreeable to therapy  -KB  pt supine in bed no acute distress  -  patient seated upright in bed; agreeable to therapy at bedside  -KB    Start Time (Evaluation/Treatment)  1115  -KB  --  0830  -KB    Stop Time (Evaluation/Treatment)  1145  -KB  --  0900  -KB    Recorded by [KB] Bruton, Katherine L [LH] Laura Foster, PT [KB] Bruton, Katherine L    Row Name 08/23/19 0800             Evaluation/Treatment Time and Intent    Subjective Information  no complaints  -RP      Existing Precautions/Restrictions  fall  -RP      Document Type  therapy note (daily note)  -RP      Mode of Treatment  occupational therapy  -RP      Patient/Family Observations  pt seated in w/c in dining room w/ no signs of acute distress  -RP      Recorded by [RP] Gabriela Brown OT      Row Name 08/23/19 0937 08/23/19 0800          Cognition/Psychosocial- PT/OT    Affect/Mental Status (Cognitive)  anxious;confused  -  --     Behavioral Issues (Cognitive)  overwhelmed easily  -  --     Orientation Status (Cognition)  unable/difficult to assess  -  unable/difficult to assess  -RP     Follows Commands (Cognition)  follows one step commands;25-49% accuracy w gestures and cueing  -  follows one step commands;25-49% accuracy;verbal cues/prompting required;physical/tactile prompts required  -     Personal Safety Interventions  fall prevention program maintained;gait belt;supervised activity sitter  -  fall prevention program maintained;gait belt;nonskid shoes/slippers when out of bed  -RP     Attention Deficit (Cognitive)  --  moderate deficit;severe deficit  -RP     Safety Deficit (Cognitive)  --  moderate deficit;insight into deficits/self awareness;awareness of need for assistance  -RP     Recorded by [LH] Laura Foster, PT [RP] Gabriela Brown OT     Row Name 08/23/19  0937 08/23/19 0800          Bed Mobility Assessment/Treatment    Bed Mobility Assessment/Treatment  --  sit-supine  -RP     Supine-Sit [place] (Bed Mobility)  supervision  -  --     Sit-Supine [place] (Bed Mobility)  supervision  -  supervision  -     Recorded by [] Laura Foster, PT [RP] Gabriela Brown, OT     Row Name 08/23/19 0800             Transfer Assessment/Treatment    Transfer Assessment/Treatment  sit-stand transfer;stand-sit transfer;chair-bed transfer  -RP      Recorded by [RP] Gabriela Brown, OT      Row Name 08/23/19 0937             Bed-Chair Transfer    Bed-Chair [place] (Transfers)  stand by assist  -      Recorded by [] Laura Foster, PT      Row Name 08/23/19 0937 08/23/19 0800          Chair-Bed Transfer    Chair-Bed [place] (Transfers)  stand by assist  -  stand by assist  -     Assistive Device (Chair-Bed Transfers)  --  wheelchair  -RP     Recorded by [] Laura Foster, PT [RP] Gabriela Brown, OT     Row Name 08/23/19 0937 08/23/19 0800          Sit-Stand Transfer    Sit-Stand [place] (Transfers)  stand by assist  -  stand by assist  -     Assistive Device (Sit-Stand Transfers)  --  wheelchair  -RP     Recorded by [] Laura Foster, PT [RP] Gabriela Brown, OT     Row Name 08/23/19 0937 08/23/19 0800          Stand-Sit Transfer    Stand-Sit [place] (Transfers)  stand by assist  -  stand by assist  -     Assistive Device (Stand-Sit Transfers)  --  wheelchair  -RP     Recorded by [] Laura Foster, PT [RP] Gabriela Brown, OT     Row Name 08/23/19 0937             Gait/Stairs Assessment/Training    [place] Level (Gait)  contact guard;stand by assist  -      Distance in Feet (Gait)  220x2  -      Pattern (Gait)  step-through  -      Deviations/Abnormal Patterns (Gait)  festinating/shuffling;gait speed decreased  -      Bilateral Gait Deviations  heel strike decreased;forward flexed posture  -      Recorded by [] Cristian  Laura NOBLE PT      Row Name 08/23/19 0800             Basic Activities of Daily Living (BADLs)    Basic Activities of Daily Living  bathing;upper body dressing;lower body dressing;grooming  -RP      Recorded by [RP] Gabriela Brown, OT      Row Name 08/23/19 0800             Bathing Assessment/Treatment    Bathing [place] Level  bathing skills;upper body;set up;verbal cues  -RP      Bathing Position  sink side;supported sitting  -RP      Bathing Setup Assistance  obtain supplies  -RP      Recorded by [RP] Gabriela Brown, OT      Row Name 08/23/19 0800             Upper Body Dressing Assessment/Treatment    Upper Body Dressing Task  upper body dressing skills;doff;don;pull over garment;set up assistance;verbal cues  -RP      Upper Body Dressing Position  supported sitting  -RP      Set-up Assistance (Upper Body Dressing)  obtain clothing  -RP      Recorded by [RP] Gabriela Brown, OT      Row Name 08/23/19 0800             Lower Body Dressing Assessment/Treatment    Lower Body Dressing [place] Level  doff;don;pants/bottoms;socks;minimum assist (75% patient effort);verbal cues  -RP      Lower Body Dressing Position  supported sitting;supported standing  -RP      Lower Body Dressing Setup Assistance  obtain clothing  -RP      Recorded by [RP] Gabriela Brown, OT      Row Name 08/23/19 0800             Grooming Assessment/Treatment    Grooming [place] Level  grooming skills;hair care, combing/brushing;oral care regimen;wash face, hands;set up;supervision  -RP      Grooming Position  sink side;supported sitting  -RP      Grooming Setup Assistance  obtain supplies  -RP      Recorded by [RP] Gabriela Brown, OT      Row Name 08/23/19 1115 08/23/19 0830          Pain Scale: Numbers Pre/Post-Treatment    Pain Scale: Numbers, Pretreatment  0/10 - no pain  -KB  0/10 - no pain  -KB     Pain Scale: Numbers, Post-Treatment  0/10 - no pain  -KB  0/10 - no pain  -KB     Recorded by [KB] Bruton, Katherine L [KB]  Bruton, Katherine L     Row Name 08/23/19 0937 08/23/19 0800          Pain Scale: FACES Pre/Post-Treatment    Pain: FACES Scale, Pretreatment  0-->no hurt  -LH  0-->no hurt  -RP     Pain: FACES Scale, Post-Treatment  0-->no hurt  -LH  0-->no hurt  -RP     Recorded by [] Laura Foster, PT [RP] Gabriela Brown, OT     Row Name 08/23/19 0937             Standing Balance Activity    Activities Performed (Standing, Balance Training)  standing ball toss balloon  -      Support Needed for Balance (Standing, Balance Training)  CGA;balances without upper extremity support  -      Comment (Standing, Balance Training)  balloon bating and toss multidirectional. also standing kickball CGA, 1 LOB w  min to recover  -      Recorded by [] Laura Foster, HESHAM      Row Name 08/23/19 0937 08/23/19 0800          Positioning and Restraints    Pre-Treatment Position  in bed  -  sitting in chair/recliner  -     Post Treatment Position  bed  -  bed  -RP     In Bed  supine;call light within reach;encouraged to call for assist;exit alarm on sitter  -  supine;call light within reach;encouraged to call for assist;exit alarm on;with nsg  -RP     Recorded by [] Laura Fsoter, PT [RP] Gabriela Brown, OT       User Key  (r) = Recorded By, (t) = Taken By, (c) = Cosigned By    Initials Name Effective Dates     Laura Foster, PT 04/03/18 -     KB Bruton, Kathernorma THORNTON 03/07/18 -     Gabriela Davis OT 05/03/18 -         Wound 07/17/19 1015 Left neck incision (Active)   Dressing Appearance open to air 8/23/2019  8:00 AM   Closure Liquid skin adhesive 8/23/2019  8:00 AM   Base dry;clean 8/22/2019  9:49 PM   Drainage Amount none 8/22/2019  9:49 PM   Dressing Care, Wound open to air 8/22/2019  9:49 PM     Physical Therapy Education     Title: PT OT SLP Therapies (Not Started)     Topic: Physical Therapy (In Progress)     Point: Mobility training (In Progress)     Learning Progress Summary           Patient Nonacceptance, E,TB,D, NR  by GREGORY at 8/23/2019  9:45 AM    Acceptance, E,D, NR by NESTOR at 8/20/2019  1:50 PM    Acceptance, E, NR,NL by JHONNY at 8/19/2019 11:08 PM    Acceptance, E,TB, NR by KELBY at 8/19/2019 12:11 PM    Nonacceptance, E,D, NR by DONI at 8/17/2019 11:44 AM    Acceptance, E,TB, VU,NR by KELBY at 8/14/2019  3:15 PM    Nonacceptance, E,TB,D, NR by ENID at 8/5/2019 12:00 PM    Acceptance, E,D, NR by NESTOR at 8/3/2019  1:35 PM    Acceptance, D, DU,NR by NESTOR at 8/3/2019  8:56 AM                   Point: Home exercise program (In Progress)     Learning Progress Summary           Patient Acceptance, E, NR,NL by JHONNY at 8/19/2019 11:08 PM    Nonacceptance, E,D, NR by DONI at 8/17/2019 11:44 AM    Nonacceptance, E,TB,D, NR by ENID at 8/5/2019 12:00 PM                   Point: Body mechanics (In Progress)     Learning Progress Summary           Patient Acceptance, E, NR,NL by JHONNY at 8/19/2019 11:08 PM    Nonacceptance, E,D, NR by DONI at 8/17/2019 11:44 AM                   Point: Precautions (In Progress)     Learning Progress Summary           Patient Acceptance, E, NR,NL by JHONNY at 8/19/2019 11:08 PM    Nonacceptance, E,D, NR by DONI at 8/17/2019 11:44 AM    Acceptance, E, NR by MD at 8/16/2019  8:36 AM    Acceptance, E, NR by MD at 8/15/2019  8:59 AM    Acceptance, E, NR by MD at 8/13/2019 11:14 AM    Acceptance, E, NR by MD at 8/12/2019 10:45 AM    Acceptance, E, NR by MD at 8/10/2019 11:15 AM    Acceptance, E, NR by MD at 8/9/2019 11:12 AM    Acceptance, E, NR by MD at 8/8/2019 11:48 AM    Acceptance, E, NR by MD at 8/6/2019 10:41 AM    Acceptance, E, NR by MD at 8/2/2019 10:44 AM    Acceptance, E,D, NR by MD at 8/1/2019 12:16 PM                               User Key     Initials Effective Dates Name Provider Type Discipline    KELBY 06/08/18 -  Poppy Rosa, PT Physical Therapist PT    JS 04/06/17 -  Steph Salinas, PT Physical Therapist PT    LH 04/03/18 -  Laura Foster, PT Physical Therapist PT    CAMERONK 04/03/18 -  Nicole Austin, PT Physical Therapist PT     MD 04/03/18 -  Mira Chadwick, PT Physical Therapist PT    KP 04/03/18 -  Marycruz Schmitt PT Physical Therapist PT    WN 06/24/19 -  Luis Abernathy, RN Registered Nurse Nurse                  PT Recommendation and Plan                        Time Calculation:     PT Charges     Row Name 08/23/19 1401 08/23/19 0944          Time Calculation    Start Time  --  0900  -     Stop Time  --  0930  -     Time Calculation (min)  --  30 min  -     PT Received On  --  08/23/19  -     PT - Next Appointment  08/24/19  -  08/24/19  -       User Key  (r) = Recorded By, (t) = Taken By, (c) = Cosigned By    Initials Name Provider Type     Laura Foster, PT Physical Therapist          Therapy Charges for Today     Code Description Service Date Service Provider Modifiers Qty    51757786900 HC PT THER PROC EA 15 MIN 8/23/2019 Laura Foster, PT GP 2                   Laura Foster PT  8/23/2019         Electronically signed by Laura Foster PT at 8/23/2019  2:05 PM     Progress Notes signed by Mira Chadwick, PT at 8/26/2019  3:03 PM   Version 1 of 1       Inpatient Rehabilitation Functional Measures Assessment and Plan of Care    Plan of Care  Updated Problems/Interventions  Mobility    [PT] Bed/Chair/Wheelchair(Active)  Current Status(08/26/2019): SBA  Weekly Goal(08/30/2019): SBA  Discharge Goal: SBA    [PT] Walk(Active)  Current Status(08/26/2019): 300` SBA  Weekly Goal(08/30/2019): to and from BR, SBA  Discharge Goal: 300` SBA    [PT] Stairs(Active)  Current Status(08/26/2019): 4 steps CGA  Weekly Goal(08/30/2019): PT only  Discharge Goal: 4 steps CGA    Functional Measures  KATI Eating:  Branch  KATI Grooming: Branch  KATI Bathing:  Branch  KATI Upper Body Dressing:  Branch  KATI Lower Body Dressing:  Branch  KATI Toileting:  Branch    KATI Bladder Management  Level of Assistance:  Branch  Frequency/Number of Accidents this Shift:  Branch    KATI Bowel Management  Level of Assistance: Branch  Frequency/Number of Accidents this  Shift: Branch    KATI Bed/Chair/Wheelchair Transfer:  Activity was not observed.  KATI Toilet Transfer:  Branch  KATI Tub/Shower Transfer:  Branch    Previously Documented Mode of Locomotion at Discharge: Field  KATI Expected Mode of Locomotion at Discharge: Branch  [facility] Walk/Wheelchair:  WHEELCHAIR OBSERVATION   Activity was not observed.    WALK OBSERVATION   Walk Distance Scale = 3.  Distance walked is greater than 150 feet. Walk Score  = 5.  Patient requires supervision or set up for walking. Stand by assistance.  Patient walked a distance of  300 feet. No assistive devices were required.  KATI Stairs:  Activity was not observed.    KATI Comprehension:  [facility] Expression:  [facility] Social Interaction:  [facility] Problem Solving:  [facility] Memory:  [place]    Therapy Mode Minutes  Occupational Therapy: [place]  Physical Therapy: Individual: 30 minutes.  Speech Language Pathology:  Branch    Signed by: Mira Chadwick PT      Electronically signed by Mira Chadwick PT at 8/26/2019  3:03 PM     Progress Notes signed by Mira Chadwick PT at 8/27/2019  3:07 PM   Version 1 of 1       Inpatient Rehabilitation Functional Measures Assessment    Functional Measures  KATI Eating:  [facility] Grooming: [facility] Bathing:  [facility] Upper Body Dressing:  [facility] Lower Body Dressing:  [facility] Toileting:  [facility] Bladder Management  Level of Assistance:  [place]  Frequency/Number of Accidents this Shift:  [facility] Bowel Management  Level of Assistance: [place]  Frequency/Number of Accidents this Shift: Branch    [facility] Bed/Chair/Wheelchair Transfer:  Bed/chair/wheelchair Transfer did not occur  because patient refused. .  KATI Toilet Transfer:  Branch  [facility] Tub/Shower Transfer:  Branch    Previously Documented Mode of Locomotion at Discharge: Field  KATI Expected Mode of Locomotion at Discharge: [facility] Walk/Wheelchair:  WHEELCHAIR OBSERVATION   Wheelchair did not occur because patient refused.    WALK  OBSERVATION   Walking did not occur because patient refused.  KATI Stairs:  Stairs did not occur because patient refused.    KATI Comprehension:  Branch  KATI Expression:  Branch  KATI Social Interaction:  Branch  KATI Problem Solving:  Branch  KATI Memory:  Branch    Therapy Mode Minutes  Occupational Therapy: Branch  Physical Therapy: Individual: 0 minutes.  Speech Language Pathology:  Branch    Signed by: Mira Chadwick PT      Electronically signed by Mira Chadwick PT at 8/27/2019  3:07 PM     Progress Notes signed by Mira Chadwick PT at 8/28/2019  3:43 PM   Version 1 of 1       Inpatient Rehabilitation Functional Measures Assessment    Functional Measures  KATI Eating:  Branch  KATI Grooming: Branch  KATI Bathing:  Branch  KATI Upper Body Dressing:  Branch  KATI Lower Body Dressing:  Branch  KATI Toileting:  Branch    KATI Bladder Management  Level of Assistance:  [place]  Frequency/Number of Accidents this Shift:  [facility] Bowel Management  Level of Assistance: [place]  Frequency/Number of Accidents this Shift: Branch    [facility] Bed/Chair/Wheelchair Transfer:  Activity was not observed.  KATI Toilet Transfer:  Branch  [facility] Tub/Shower Transfer:  [place]    Previously Documented Mode of Locomotion at Discharge: Field  KATI Expected Mode of Locomotion at Discharge: Branch  [facility] Walk/Wheelchair:  WHEELCHAIR OBSERVATION   Activity was not observed.    WALK OBSERVATION   Activity was not observed.  KATI Stairs:  Activity was not observed.    KATI Comprehension:  Branch  KATI Expression:  Branch  KATI Social Interaction:  Branch  KATI Problem Solving:  Branch  KATI Memory:  Branch    Therapy Mode Minutes  Occupational Therapy: Branch  Physical Therapy: Individual: 0 minutes.  Speech Language Pathology:  Branch    Signed by: Mira Chadwick PT      Electronically signed by Mira Chadwick PT at 8/28/2019  3:43 PM     Progress Notes signed by Nicole Austin PT at 8/29/2019  2:50 PM   Version 1 of 1       Inpatient Rehabilitation Functional Measures  Assessment    Functional Measures  KATI Eating:  Branch  KATI Grooming: Branch  KATI Bathing:  Branch  KATI Upper Body Dressing:  Branch  KATI Lower Body Dressing:  Branch  [facility] Toileting:  Branch    KATI Bladder Management  Level of Assistance:  Branch  Frequency/Number of Accidents this Shift:  Branch    KATI Bowel Management  Level of Assistance: Branch  Frequency/Number of Accidents this Shift: Branch    KATI Bed/Chair/Wheelchair Transfer:  Bed/chair/wheelchair Transfer did not occur  because patient refused. .  KATI Toilet Transfer:  Branch  [facility] Tub/Shower Transfer:  Branch    Previously Documented Mode of Locomotion at Discharge: Field  [facility] Expected Mode of Locomotion at Discharge: Branch  KATI Walk/Wheelchair:  WHEELCHAIR OBSERVATION   Wheelchair did not occur because patient refused.    WALK OBSERVATION   Walking did not occur because patient refused.  KATI Stairs:  Stairs did not occur because patient refused.    KATI Comprehension:  [facility] Expression:  [facility] Social Interaction:  [facility] Problem Solving:  [facility] Memory:  [place]    Therapy Mode Minutes  Occupational Therapy: [place]  Physical Therapy: Individual: 0 minutes.  Speech Language Pathology:  Branch    Signed by: Nicole Austin PT      Electronically signed by Nicole Austin PT at 2019  2:50 PM     Nicole Austin PT at 2019  3:05 PM  Version 1 of 1         Inpatient Rehabilitation - Physical Therapy Treatment Note/Discharge  [facility]     Patient Name: Darby Workman  : 1945  MRN: [id number]  Today's Date: 2019             Admit Date: 2019    Visit Dx:  No diagnosis found.  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)   • Right-sided headache   • Acute ischemic stroke (CMS/HCC)   • Embolic stroke (CMS/HCC)   • Cerebral infarction due  to stenosis of left carotid artery (CMS/HCC)   • Anticoagulated by anticoagulation treatment   • Stroke (cerebrum) (CMS/HCC)       Physical Therapy Education     Title: PT OT SLP Therapies (Not Started)     Topic: Physical Therapy (In Progress)     Point: Mobility training (In Progress)     Learning Progress Summary           Patient Nonacceptance, E,TB,D, NR by GREGORY at 8/23/2019  9:45 AM    Acceptance, E,D, NR by NESTOR at 8/20/2019  1:50 PM    Acceptance, E, NR,NL by JHONNY at 8/19/2019 11:08 PM    Acceptance, E,TB, NR by KELBY at 8/19/2019 12:11 PM    Nonacceptance, E,D, NR by DONI at 8/17/2019 11:44 AM    Acceptance, E,TB, VU,NR by KELBY at 8/14/2019  3:15 PM    Nonacceptance, E,TB,D, NR by ENID at 8/5/2019 12:00 PM    Acceptance, E,D, NR by NESTOR at 8/3/2019  1:35 PM    Acceptance, D, DU,NR by NESTOR at 8/3/2019  8:56 AM                   Point: Home exercise program (In Progress)     Learning Progress Summary           Patient Acceptance, E, NR,NL by JHONNY at 8/19/2019 11:08 PM    Nonacceptance, E,D, NR by DONI at 8/17/2019 11:44 AM    Nonacceptance, E,TB,D, NR by ENID at 8/5/2019 12:00 PM                   Point: Body mechanics (In Progress)     Learning Progress Summary           Patient Acceptance, E, NR,NL by JHONNY at 8/19/2019 11:08 PM    Nonacceptance, E,D, NR by DONI at 8/17/2019 11:44 AM                   Point: Precautions (In Progress)     Learning Progress Summary           Patient Nonacceptance, E,D, NR,NL by MD at 8/26/2019 11:30 AM    Acceptance, E, NR,NL by JHONNY at 8/19/2019 11:08 PM    Nonacceptance, E,D, NR by DONI at 8/17/2019 11:44 AM    Acceptance, E, NR by MD at 8/16/2019  8:36 AM    Acceptance, E, NR by MD at 8/15/2019  8:59 AM    Acceptance, E, NR by MD at 8/13/2019 11:14 AM    Acceptance, E, NR by MD at 8/12/2019 10:45 AM    Acceptance, E, NR by MD at 8/10/2019 11:15 AM    Acceptance, E, NR by MD at 8/9/2019 11:12 AM    Acceptance, E, NR by MD at 8/8/2019 11:48 AM    Acceptance, E, NR by MD at 8/6/2019 10:41 AM    Acceptance, E,  NR by MD at 8/2/2019 10:44 AM    Matthew, ANA PAULA BALDWIN, NR by MD at 8/1/2019 12:16 PM                               User Key     Initials Effective Dates Name Provider Type Discipline    KELBY 06/08/18 -  Poppy Rosa, PT Physical Therapist PT    JS 04/06/17 -  Steph Salinas, PT Physical Therapist PT    LH 04/03/18 -  Laura Foster, PT Physical Therapist PT    JK 04/03/18 -  Nicole Austin, PT Physical Therapist PT    MD 04/03/18 -  Mira Chadwick, PT Physical Therapist PT    KP 04/03/18 -  Marycruz Schmitt, PT Physical Therapist PT    WN 06/24/19 -  Luis Abernathy RN Registered Nurse Nurse              Rehab Goal Summary     Row Name 08/29/19 0900             Words/Phrases/Sentences Goal 1 (SLP)    Improve Ability to Comprehend Words/Phrases/Sentences Through: Goal 1 (SLP)  identify objects, field of;other (comment) 12  -OC      Progress/Outcomes (Identify Objects and Pictures Goal 1, SLP)  continuing progress toward goal  -OC      Comment (Words/Phrases/Sentences Goal 1, SLP)  able to ID 4 trees, cups, and lamps- required extended time  -OC         Comprehend Questions Goal 1 (SLP)    Improve Ability to Comprehend Questions Goal 1 (SLP)  simple yes/no questions  -OC      Progress (Ability to Comprehend Questions Goal 1, SLP)  other (comment) yes bias, frustrated with SLP when attempted cueing  -OC        User Key  (r) = Recorded By, (t) = Taken By, (c) = Cosigned By    Initials Name Provider Type Discipline    Candace Roman MA,[facility]-SLP Speech and Language Pathologist SLP        Therapy Treatment  Rehabilitation Treatment Summary     Row Name                Wound 07/17/19 1015 Left neck incision    Wound - Properties Group Date first assessed: 07/17/19 [LD] Time first assessed: 1015 [LD] Side: Left [LD] Location: neck [LD] Type: incision [LD] Recorded by:  [LD] Martha Foster, SASHA 07/17/19 1015      User Key  (r) = Recorded By, (t) = Taken By, (c) = Cosigned By    Initials Name Effective Dates Discipline    LD Cristian  Martha BROWN RN 06/16/16 -  Nurse        Wound 07/17/19 1015 Left neck incision (Active)   Dressing Appearance open to air 8/29/2019  8:00 AM   Closure Liquid skin adhesive 8/29/2019  8:00 AM   Base clean;dry 8/29/2019  8:00 AM   Periwound intact 8/29/2019  8:00 AM   Periwound Temperature warm 8/29/2019  8:00 AM   Periwound Skin Turgor soft 8/29/2019  8:00 AM   Drainage Amount none 8/29/2019  8:00 AM       PT Recommendation and Plan               Time Calculation:   PT Charges     Row Name 08/29/19 1329             Time Calculation    PT - Next Appointment  08/30/19  -NESTOR        User Key  (r) = Recorded By, (t) = Taken By, (c) = Cosigned By    Initials Name Provider Type    Nicole Ayon, PT Physical Therapist                 PT Discharge Summary  Reason for Discharge: Change in medical status  Outcomes Achieved: Able to achieve all goals within established timeline  Discharge Destination: other (comment)([facility] due to behavioral changes)    Nicole Austin, PT  8/29/2019         Electronically signed by Nicole Austin, PT at 8/29/2019  3:06 PM

## 2019-08-31 LAB
GLUCOSE BLDC GLUCOMTR-MCNC: 134 MG/DL (ref 70–130)
GLUCOSE BLDC GLUCOMTR-MCNC: 160 MG/DL (ref 70–130)
GLUCOSE BLDC GLUCOMTR-MCNC: 168 MG/DL (ref 70–130)
GLUCOSE BLDC GLUCOMTR-MCNC: 193 MG/DL (ref 70–130)

## 2019-08-31 PROCEDURE — 82962 GLUCOSE BLOOD TEST: CPT

## 2019-08-31 RX ADMIN — METFORMIN HYDROCHLORIDE 1000 MG: 1000 TABLET ORAL at 17:46

## 2019-08-31 RX ADMIN — OLANZAPINE 5 MG: 10 INJECTION, POWDER, FOR SOLUTION INTRAMUSCULAR at 21:21

## 2019-08-31 RX ADMIN — GLIPIZIDE 5 MG: 5 TABLET ORAL at 18:11

## 2019-08-31 RX ADMIN — HYDRALAZINE HYDROCHLORIDE 50 MG: 50 TABLET, FILM COATED ORAL at 17:46

## 2019-08-31 RX ADMIN — PANTOPRAZOLE SODIUM 40 MG: 40 TABLET, DELAYED RELEASE ORAL at 17:52

## 2019-09-01 LAB
GLUCOSE BLDC GLUCOMTR-MCNC: 113 MG/DL (ref 70–130)
GLUCOSE BLDC GLUCOMTR-MCNC: 138 MG/DL (ref 70–130)
GLUCOSE BLDC GLUCOMTR-MCNC: 93 MG/DL (ref 70–130)

## 2019-09-01 PROCEDURE — 82962 GLUCOSE BLOOD TEST: CPT

## 2019-09-01 RX ORDER — OLANZAPINE 2.5 MG/1
2.5 TABLET ORAL NIGHTLY
Status: DISCONTINUED | OUTPATIENT
Start: 2019-09-01 | End: 2019-09-05

## 2019-09-01 RX ADMIN — AMLODIPINE BESYLATE 5 MG: 5 TABLET ORAL at 08:14

## 2019-09-01 RX ADMIN — GLIPIZIDE 5 MG: 5 TABLET ORAL at 08:15

## 2019-09-01 RX ADMIN — PAROXETINE HYDROCHLORIDE 10 MG: 10 TABLET, FILM COATED ORAL at 08:15

## 2019-09-01 RX ADMIN — METFORMIN HYDROCHLORIDE 1000 MG: 1000 TABLET ORAL at 08:16

## 2019-09-01 RX ADMIN — GLIPIZIDE 5 MG: 5 TABLET ORAL at 17:30

## 2019-09-01 RX ADMIN — APIXABAN 5 MG: 5 TABLET, FILM COATED ORAL at 20:34

## 2019-09-01 RX ADMIN — DORZOLAMIDE HYDROCHLORIDE 1 DROP: 20 SOLUTION/ DROPS OPHTHALMIC at 08:17

## 2019-09-01 RX ADMIN — PANTOPRAZOLE SODIUM 40 MG: 40 TABLET, DELAYED RELEASE ORAL at 17:30

## 2019-09-01 RX ADMIN — DEXTROMETHORPHAN HYDROBROMIDE AND QUINIDINE SULFATE 1 CAPSULE: 20; 10 CAPSULE, GELATIN COATED ORAL at 08:16

## 2019-09-01 RX ADMIN — NEBIVOLOL HYDROCHLORIDE 20 MG: 10 TABLET ORAL at 08:15

## 2019-09-01 RX ADMIN — HYDRALAZINE HYDROCHLORIDE 50 MG: 50 TABLET, FILM COATED ORAL at 14:00

## 2019-09-01 RX ADMIN — METFORMIN HYDROCHLORIDE 1000 MG: 1000 TABLET ORAL at 17:30

## 2019-09-01 RX ADMIN — PANTOPRAZOLE SODIUM 40 MG: 40 TABLET, DELAYED RELEASE ORAL at 08:19

## 2019-09-01 RX ADMIN — CLOPIDOGREL 75 MG: 75 TABLET, FILM COATED ORAL at 08:14

## 2019-09-01 RX ADMIN — LOSARTAN POTASSIUM: 50 TABLET, FILM COATED ORAL at 08:16

## 2019-09-01 RX ADMIN — HYDRALAZINE HYDROCHLORIDE 50 MG: 50 TABLET, FILM COATED ORAL at 21:15

## 2019-09-01 RX ADMIN — APIXABAN 5 MG: 5 TABLET, FILM COATED ORAL at 08:14

## 2019-09-01 RX ADMIN — BRIMONIDINE TARTRATE 1 DROP: 2 SOLUTION OPHTHALMIC at 08:17

## 2019-09-01 RX ADMIN — POTASSIUM CHLORIDE 20 MEQ: 1.5 POWDER, FOR SOLUTION ORAL at 08:14

## 2019-09-01 RX ADMIN — OLANZAPINE 2.5 MG: 2.5 TABLET, FILM COATED ORAL at 20:36

## 2019-09-01 RX ADMIN — HYDRALAZINE HYDROCHLORIDE 50 MG: 50 TABLET, FILM COATED ORAL at 08:15

## 2019-09-01 RX ADMIN — ASPIRIN 325 MG: 325 TABLET ORAL at 08:14

## 2019-09-01 RX ADMIN — ATORVASTATIN CALCIUM 80 MG: 80 TABLET, FILM COATED ORAL at 20:34

## 2019-09-01 RX ADMIN — AMLODIPINE BESYLATE 5 MG: 5 TABLET ORAL at 20:34

## 2019-09-01 RX ADMIN — Medication 3 MG: at 20:34

## 2019-09-01 NOTE — PROGRESS NOTES
The patient has continued to exhibit some behavioral issues during the night but did comply with medications this morning.  I will add scheduled dose of Zyprexa at nighttime.

## 2019-09-01 NOTE — PLAN OF CARE
Problem: Patient Care Overview  Goal: Plan of Care Review  Outcome: Ongoing (interventions implemented as appropriate)   08/31/19 3760 09/01/19 2698   OTHER   Outcome Summary --  Pt was irritable at beginning of shift. Pt continued to try and come into nurse's station despite multiple redirection from staff. Administered PRN medication for agitation/anxiety (see MAR). Staff took pt to bed, and has had no further behavioral issues as of this note. Refused HS medication. Will continue to monitor and assess.    Coping/Psychosocial   Plan of Care Reviewed With patient --    Coping/Psychosocial   Patient Agreement with Plan of Care unable to participate --        Problem: Overarching Goals (Adult)  Goal: Adheres to Safety Considerations for Self and Others  Outcome: Ongoing (interventions implemented as appropriate)    Goal: Optimized Coping Skills in Response to Life Stressors  Outcome: Ongoing (interventions implemented as appropriate)    Goal: Develops/Participates in Therapeutic [place] to Support Successful Transition  Outcome: Ongoing (interventions implemented as appropriate)      Problem: Decreased Participation/Engagement (Depressive Signs/Symptoms) (Adult)  Goal: Increased Participation/Engagement (Depressive Signs/Symptoms)  Outcome: Ongoing (interventions implemented as appropriate)      Problem: Cognitive Impairment (Dementia Signs/Symptoms) (Adult)  Goal: Optimized Cognitive Function (Dementia Signs/Symptoms)  Outcome: Ongoing (interventions implemented as appropriate)      Problem: Psychological Impairment (Dementia Signs/Symptoms) (Adult)  Goal: Improved Psychological Symptoms (Dementia Signs/Symptoms)  Outcome: Ongoing (interventions implemented as appropriate)      Problem: Skin Injury Risk (Adult)  Goal: Skin Health and Integrity  Outcome: Ongoing (interventions implemented as appropriate)      Problem: Fall Risk (Adult)  Goal: Absence of Fall  Outcome: Ongoing (interventions implemented as  appropriate)

## 2019-09-01 NOTE — PLAN OF CARE
Problem: Patient Care Overview  Goal: Plan of Care Review  Outcome: Ongoing (interventions implemented as appropriate)   09/01/19 1210 09/01/19 9383   Plan of Care Review   Progress --  improving   OTHER   Outcome Summary --  Pt was compliant with her medications this shift. Continues to have the nonsensical speech but much calm today than she was yesterday. In her room most of the day but able to follow direction today. Steady on feet but still needs assistance x1 with ADL's. Nurse unable to assess for SI/HI, anxiety or depression. No falls noted at this time. Will continue to monitor patient and note any changes.   Coping/Psychosocial   Plan of Care Reviewed With patient --    Coping/Psychosocial   Patient Agreement with Plan of Care unable to participate --        Problem: Skin Injury Risk (Adult)  Goal: Skin Health and Integrity  Outcome: Ongoing (interventions implemented as appropriate)      Problem: Fall Risk (Adult)  Goal: Absence of Fall  Outcome: Ongoing (interventions implemented as appropriate)

## 2019-09-02 LAB
ANION GAP SERPL CALCULATED.3IONS-SCNC: 8.5 MMOL/L (ref 5–15)
BASOPHILS # BLD AUTO: 0.04 10*3/MM3 (ref 0–0.2)
BASOPHILS NFR BLD AUTO: 0.8 % (ref 0–1.5)
BUN BLD-MCNC: 21 MG/DL (ref 8–23)
BUN/CREAT SERPL: 31.8 (ref 7–25)
CALCIUM SPEC-SCNC: 8.9 MG/DL (ref 8.6–10.5)
CHLORIDE SERPL-SCNC: 105 MMOL/L (ref 98–107)
CO2 SERPL-SCNC: 25.5 MMOL/L (ref 22–29)
CREAT BLD-MCNC: 0.66 MG/DL (ref 0.57–1)
DEPRECATED RDW RBC AUTO: 49.2 FL (ref 37–54)
EOSINOPHIL # BLD AUTO: 0.17 10*3/MM3 (ref 0–0.4)
EOSINOPHIL NFR BLD AUTO: 3.3 % (ref 0.3–6.2)
ERYTHROCYTE [DISTWIDTH] IN BLOOD BY AUTOMATED COUNT: 14.6 % (ref 12.3–15.4)
GFR SERPL CREATININE-BSD FRML MDRD: 87 ML/MIN/1.73
GLUCOSE BLD-MCNC: 111 MG/DL (ref 65–99)
GLUCOSE BLDC GLUCOMTR-MCNC: 118 MG/DL (ref 70–130)
GLUCOSE BLDC GLUCOMTR-MCNC: 160 MG/DL (ref 70–130)
GLUCOSE BLDC GLUCOMTR-MCNC: 257 MG/DL (ref 70–130)
GLUCOSE BLDC GLUCOMTR-MCNC: 66 MG/DL (ref 70–130)
GLUCOSE BLDC GLUCOMTR-MCNC: 89 MG/DL (ref 70–130)
HCT VFR BLD AUTO: 31.4 % (ref 34–46.6)
HGB BLD-MCNC: 9.8 G/DL (ref 12–15.9)
IMM GRANULOCYTES # BLD AUTO: 0.02 10*3/MM3 (ref 0–0.05)
IMM GRANULOCYTES NFR BLD AUTO: 0.4 % (ref 0–0.5)
LYMPHOCYTES # BLD AUTO: 0.9 10*3/MM3 (ref 0.7–3.1)
LYMPHOCYTES NFR BLD AUTO: 17.4 % (ref 19.6–45.3)
MCH RBC QN AUTO: 28.8 PG (ref 26.6–33)
MCHC RBC AUTO-ENTMCNC: 31.2 G/DL (ref 31.5–35.7)
MCV RBC AUTO: 92.4 FL (ref 79–97)
MONOCYTES # BLD AUTO: 0.41 10*3/MM3 (ref 0.1–0.9)
MONOCYTES NFR BLD AUTO: 7.9 % (ref 5–12)
NEUTROPHILS # BLD AUTO: 3.62 10*3/MM3 (ref 1.7–7)
NEUTROPHILS NFR BLD AUTO: 70.2 % (ref 42.7–76)
NRBC BLD AUTO-RTO: 0 /100 WBC (ref 0–0.2)
PLATELET # BLD AUTO: 246 10*3/MM3 (ref 140–450)
PMV BLD AUTO: 10.6 FL (ref 6–12)
POTASSIUM BLD-SCNC: 3.6 MMOL/L (ref 3.5–5.2)
RBC # BLD AUTO: 3.4 10*6/MM3 (ref 3.77–5.28)
SODIUM BLD-SCNC: 139 MMOL/L (ref 136–145)
WBC NRBC COR # BLD: 5.16 10*3/MM3 (ref 3.4–10.8)

## 2019-09-02 PROCEDURE — 82962 GLUCOSE BLOOD TEST: CPT

## 2019-09-02 PROCEDURE — 63710000001 INSULIN LISPRO (HUMAN) PER 5 UNITS: Performed by: PHYSICAL MEDICINE & REHABILITATION

## 2019-09-02 PROCEDURE — 85025 COMPLETE CBC W/AUTO DIFF WBC: CPT | Performed by: PHYSICAL MEDICINE & REHABILITATION

## 2019-09-02 PROCEDURE — 80048 BASIC METABOLIC PNL TOTAL CA: CPT | Performed by: PHYSICAL MEDICINE & REHABILITATION

## 2019-09-02 RX ADMIN — LOSARTAN POTASSIUM: 50 TABLET, FILM COATED ORAL at 10:49

## 2019-09-02 RX ADMIN — PANTOPRAZOLE SODIUM 40 MG: 40 TABLET, DELAYED RELEASE ORAL at 10:49

## 2019-09-02 RX ADMIN — DEXTROMETHORPHAN HYDROBROMIDE AND QUINIDINE SULFATE 1 CAPSULE: 20; 10 CAPSULE, GELATIN COATED ORAL at 10:51

## 2019-09-02 RX ADMIN — INSULIN LISPRO 4 UNITS: 100 INJECTION, SOLUTION INTRAVENOUS; SUBCUTANEOUS at 12:44

## 2019-09-02 RX ADMIN — DORZOLAMIDE HYDROCHLORIDE 1 DROP: 20 SOLUTION/ DROPS OPHTHALMIC at 10:52

## 2019-09-02 RX ADMIN — OLANZAPINE 2.5 MG: 2.5 TABLET, FILM COATED ORAL at 20:24

## 2019-09-02 RX ADMIN — ASPIRIN 325 MG: 325 TABLET ORAL at 10:53

## 2019-09-02 RX ADMIN — METFORMIN HYDROCHLORIDE 1000 MG: 1000 TABLET ORAL at 10:47

## 2019-09-02 RX ADMIN — HYDRALAZINE HYDROCHLORIDE 50 MG: 50 TABLET, FILM COATED ORAL at 10:54

## 2019-09-02 RX ADMIN — PAROXETINE HYDROCHLORIDE 10 MG: 10 TABLET, FILM COATED ORAL at 10:48

## 2019-09-02 RX ADMIN — AMLODIPINE BESYLATE 5 MG: 5 TABLET ORAL at 20:24

## 2019-09-02 RX ADMIN — GLIPIZIDE 5 MG: 5 TABLET ORAL at 10:47

## 2019-09-02 RX ADMIN — CLOPIDOGREL 75 MG: 75 TABLET, FILM COATED ORAL at 10:49

## 2019-09-02 RX ADMIN — Medication 3 MG: at 20:24

## 2019-09-02 RX ADMIN — APIXABAN 5 MG: 5 TABLET, FILM COATED ORAL at 10:50

## 2019-09-02 RX ADMIN — NEBIVOLOL HYDROCHLORIDE 20 MG: 10 TABLET ORAL at 11:03

## 2019-09-02 RX ADMIN — POTASSIUM CHLORIDE 20 MEQ: 1.5 POWDER, FOR SOLUTION ORAL at 10:47

## 2019-09-02 RX ADMIN — APIXABAN 5 MG: 5 TABLET, FILM COATED ORAL at 20:24

## 2019-09-02 RX ADMIN — AMLODIPINE BESYLATE 5 MG: 5 TABLET ORAL at 10:48

## 2019-09-02 RX ADMIN — HYDRALAZINE HYDROCHLORIDE 50 MG: 50 TABLET, FILM COATED ORAL at 21:16

## 2019-09-02 RX ADMIN — BRIMONIDINE TARTRATE 1 DROP: 2 SOLUTION OPHTHALMIC at 10:51

## 2019-09-02 RX ADMIN — ATORVASTATIN CALCIUM 80 MG: 80 TABLET, FILM COATED ORAL at 20:24

## 2019-09-02 NOTE — PROGRESS NOTES
Patient was compliant with medications earlier today.  Staff reports that she seems to do better when family is present.  She is again encouraged to continue compliance with medication and care.  We will increase her Nuedexta dose later this week.

## 2019-09-02 NOTE — PLAN OF CARE
Problem: Patient Care Overview  Goal: Plan of Care Review  Outcome: Ongoing (interventions implemented as appropriate)   09/02/19 0046 09/02/19 0342   Plan of Care Review   Progress --  improving   OTHER   Outcome Summary --  Pt cooperative and compliant with medications crushed in applesauce. Pt replies no to anxiety, depression, SI, HI, hallucinatins and pain. Will continue to monitor.   Coping/Psychosocial   Plan of Care Reviewed With patient --    Coping/Psychosocial   Patient Agreement with Plan of Care --  unable to participate      09/02/19 0046 09/02/19 0342   Plan of Care Review   Progress --  improving   OTHER   Outcome Summary --  Pt cooperative and compliant with medications crushed in applesauce. Pt replies no to anxiety, depression, SI, HI, hallucinatins and pain. Will continue to monitor.   Coping/Psychosocial   Plan of Care Reviewed With patient --    Coping/Psychosocial   Patient Agreement with Plan of Care --  unable to participate       Problem: Overarching Goals (Adult)  Goal: Adheres to Safety Considerations for Self and Others  Outcome: Ongoing (interventions implemented as appropriate)    Goal: Optimized Coping Skills in Response to Life Stressors  Outcome: Ongoing (interventions implemented as appropriate)    Goal: Develops/Participates in Therapeutic [place] to Support Successful Transition  Outcome: Ongoing (interventions implemented as appropriate)      Problem: Decreased Participation/Engagement (Depressive Signs/Symptoms) (Adult)  Goal: Increased Participation/Engagement (Depressive Signs/Symptoms)  Outcome: Ongoing (interventions implemented as appropriate)      Problem: Social/Occupational/Functional Impairment (Depressive Signs/Symptoms) (Adult)  Goal: Improved Social/Occupational/Functional Skills (Depressive Signs/Symptoms)  Outcome: Ongoing (interventions implemented as appropriate)      Problem: Psychomotor Impairment (Depressive Signs/Symptoms) (Adult)  Goal: Improved  Psychomotor Symptoms (Depressive Signs/Symptoms)  Outcome: Ongoing (interventions implemented as appropriate)      Problem: Cognitive Impairment (Dementia Signs/Symptoms) (Adult)  Goal: Optimized Cognitive Function (Dementia Signs/Symptoms)  Outcome: Ongoing (interventions implemented as appropriate)      Problem: Behavioral Impairment (Dementia Signs/Symptoms) (Adult)  Goal: Improved Behavioral Control (Dementia Signs/Symptoms)  Outcome: Ongoing (interventions implemented as appropriate)      Problem: Psychological Impairment (Dementia Signs/Symptoms) (Adult)  Goal: Improved Psychological Symptoms (Dementia Signs/Symptoms)  Outcome: Ongoing (interventions implemented as appropriate)      Problem: Skin Injury Risk (Adult)  Goal: Identify Related Risk Factors and Signs and Symptoms  Outcome: Ongoing (interventions implemented as appropriate)    Goal: Skin Health and Integrity  Outcome: Ongoing (interventions implemented as appropriate)      Problem: Fall Risk (Adult)  Goal: Identify Related Risk Factors and Signs and Symptoms  Outcome: Ongoing (interventions implemented as appropriate)    Goal: Absence of Fall  Outcome: Ongoing (interventions implemented as appropriate)

## 2019-09-02 NOTE — PLAN OF CARE
Problem: Patient Care Overview  Goal: Plan of Care Review  Outcome: Ongoing (interventions implemented as appropriate)   09/02/19 1849   Plan of Care Review   Progress no change   OTHER   Outcome Summary Patient started the shift off being cooperative with staff but started to get frustrated and angry at the staff because she was not able to express her self. Patient refused all medications after being compliant with AM medications. Patient ate a really good breakfast but she ate poorly at lunch and dinner. Patient has isolated to her room most of the shift. Patient at one point got on the floor and refused to get up. After verbal cues she was compliant. She get easily frustrated due to language barrier. Will continue to monitor.   Coping/Psychosocial   Plan of Care Reviewed With patient   Coping/Psychosocial   Patient Agreement with Plan of Care disagrees (describe)       Problem: Overarching Goals (Adult)  Goal: Optimized Coping Skills in Response to Life Stressors    Intervention: Promote Effective Coping Strategies   09/02/19 1730   Coping/Psychosocial Interventions   Supportive Measures active listening utilized;decision-making supported;self-care encouraged;self-reflection promoted;self-responsibility promoted;verbalization of feelings encouraged         Problem: Psychomotor Impairment (Depressive Signs/Symptoms) (Adult)  Intervention: Manage Psychomotor Movement   09/02/19 1849   Manage Psychomotor Movement   Mutually Determined Action Steps (Manage Psychomotor Movement) adheres to medication regimen;exhibits decrease in agitation         Problem: Behavioral Impairment (Dementia Signs/Symptoms) (Adult)  Intervention: Manage Behavior and Mood   09/02/19 1849   Manage Behavior and Mood   Mutually Determined Action Steps (Manage Behavior/Mood) eats at meal time;maintains regular elimination       Goal: Improved Behavioral Control (Dementia Signs/Symptoms)  Outcome: Ongoing (interventions implemented as  appropriate)   08/30/19 1810 09/02/19 1849   Improved Behavioral Control (Dementia Signs/Symptoms)   Improved Behavior Control Action Step/Short Term Goal (STG) Established --  09/02/19   Improved Behavioral Control Time Frame for Action Step (STG) 4 days --    Improved Behavioral Control Action Step (STG) Outcome --  making progress toward outcome       Problem: Skin Injury Risk (Adult)  Intervention: Prevent/Manage Excess Moisture   09/02/19 1015 09/02/19 1849   Hygiene Care   Perineal Care --  absorbent pad changed   Bathing/Skin Care incontinence care;linen changed --    Skin Interventions   Skin Protection --  adhesive use limited         Problem: Fall Risk (Adult)  Intervention: Monitor/Assist with Self Care   09/02/19 1849   Activity   Activity Assistance Provided assistance, 1 person   Daily Care Interventions   Self-Care Promotion independence encouraged       Goal: Identify Related Risk Factors and Signs and Symptoms  Outcome: Ongoing (interventions implemented as appropriate)   09/02/19 1849   Fall Risk (Adult)   Related Risk Factors (Fall Risk) confusion/agitation;age-related changes

## 2019-09-03 PROBLEM — I63.232 CEREBRAL INFARCTION DUE TO STENOSIS OF LEFT CAROTID ARTERY (HCC): Status: RESOLVED | Noted: 2019-06-28 | Resolved: 2019-09-03

## 2019-09-03 LAB
GLUCOSE BLDC GLUCOMTR-MCNC: 104 MG/DL (ref 70–130)
GLUCOSE BLDC GLUCOMTR-MCNC: 148 MG/DL (ref 70–130)
GLUCOSE BLDC GLUCOMTR-MCNC: 157 MG/DL (ref 70–130)
GLUCOSE BLDC GLUCOMTR-MCNC: 83 MG/DL (ref 70–130)

## 2019-09-03 PROCEDURE — 99024 POSTOP FOLLOW-UP VISIT: CPT | Performed by: NURSE PRACTITIONER

## 2019-09-03 PROCEDURE — 63710000001 INSULIN LISPRO (HUMAN) PER 5 UNITS: Performed by: PHYSICAL MEDICINE & REHABILITATION

## 2019-09-03 PROCEDURE — 97110 THERAPEUTIC EXERCISES: CPT

## 2019-09-03 PROCEDURE — 82962 GLUCOSE BLOOD TEST: CPT

## 2019-09-03 RX ORDER — GABAPENTIN 100 MG/1
100 CAPSULE ORAL 2 TIMES DAILY
Status: DISCONTINUED | OUTPATIENT
Start: 2019-09-03 | End: 2019-09-13 | Stop reason: HOSPADM

## 2019-09-03 RX ADMIN — DORZOLAMIDE HYDROCHLORIDE 1 DROP: 20 SOLUTION/ DROPS OPHTHALMIC at 08:05

## 2019-09-03 RX ADMIN — APIXABAN 5 MG: 5 TABLET, FILM COATED ORAL at 08:05

## 2019-09-03 RX ADMIN — Medication 3 MG: at 21:18

## 2019-09-03 RX ADMIN — AMLODIPINE BESYLATE 5 MG: 5 TABLET ORAL at 08:05

## 2019-09-03 RX ADMIN — GLIPIZIDE 5 MG: 5 TABLET ORAL at 08:04

## 2019-09-03 RX ADMIN — ATORVASTATIN CALCIUM 80 MG: 80 TABLET, FILM COATED ORAL at 21:18

## 2019-09-03 RX ADMIN — DORZOLAMIDE HYDROCHLORIDE 1 DROP: 20 SOLUTION/ DROPS OPHTHALMIC at 21:17

## 2019-09-03 RX ADMIN — HYDRALAZINE HYDROCHLORIDE 50 MG: 50 TABLET, FILM COATED ORAL at 08:04

## 2019-09-03 RX ADMIN — ASPIRIN 325 MG: 325 TABLET ORAL at 08:05

## 2019-09-03 RX ADMIN — OLANZAPINE 2.5 MG: 2.5 TABLET, FILM COATED ORAL at 21:18

## 2019-09-03 RX ADMIN — METFORMIN HYDROCHLORIDE 1000 MG: 1000 TABLET ORAL at 17:41

## 2019-09-03 RX ADMIN — HYDRALAZINE HYDROCHLORIDE 50 MG: 50 TABLET, FILM COATED ORAL at 14:17

## 2019-09-03 RX ADMIN — BRIMONIDINE TARTRATE 1 DROP: 2 SOLUTION OPHTHALMIC at 21:17

## 2019-09-03 RX ADMIN — BRIMONIDINE TARTRATE 1 DROP: 2 SOLUTION OPHTHALMIC at 08:05

## 2019-09-03 RX ADMIN — PANTOPRAZOLE SODIUM 40 MG: 40 TABLET, DELAYED RELEASE ORAL at 17:41

## 2019-09-03 RX ADMIN — CLOPIDOGREL 75 MG: 75 TABLET, FILM COATED ORAL at 08:05

## 2019-09-03 RX ADMIN — LOSARTAN POTASSIUM: 50 TABLET, FILM COATED ORAL at 08:05

## 2019-09-03 RX ADMIN — HYDRALAZINE HYDROCHLORIDE 50 MG: 50 TABLET, FILM COATED ORAL at 21:18

## 2019-09-03 RX ADMIN — GLIPIZIDE 5 MG: 5 TABLET ORAL at 17:41

## 2019-09-03 RX ADMIN — PAROXETINE HYDROCHLORIDE 10 MG: 10 TABLET, FILM COATED ORAL at 08:05

## 2019-09-03 RX ADMIN — DEXTROMETHORPHAN HYDROBROMIDE AND QUINIDINE SULFATE 1 CAPSULE: 20; 10 CAPSULE, GELATIN COATED ORAL at 08:04

## 2019-09-03 RX ADMIN — APIXABAN 5 MG: 5 TABLET, FILM COATED ORAL at 21:18

## 2019-09-03 RX ADMIN — METFORMIN HYDROCHLORIDE 1000 MG: 1000 TABLET ORAL at 08:04

## 2019-09-03 RX ADMIN — INSULIN LISPRO 2 UNITS: 100 INJECTION, SOLUTION INTRAVENOUS; SUBCUTANEOUS at 12:07

## 2019-09-03 RX ADMIN — AMLODIPINE BESYLATE 5 MG: 5 TABLET ORAL at 21:18

## 2019-09-03 RX ADMIN — POTASSIUM CHLORIDE 20 MEQ: 1.5 POWDER, FOR SOLUTION ORAL at 08:05

## 2019-09-03 RX ADMIN — GABAPENTIN 100 MG: 100 CAPSULE ORAL at 21:17

## 2019-09-03 RX ADMIN — NEBIVOLOL HYDROCHLORIDE 20 MG: 10 TABLET ORAL at 08:04

## 2019-09-03 RX ADMIN — GABAPENTIN 100 MG: 100 CAPSULE ORAL at 12:07

## 2019-09-03 NOTE — PROGRESS NOTES
[facility] PROGRESS NOTE    PATIENT IDENTIFICATION:   Name:  Darby Workman      MRN:  [id number]     74 y.o.  female               CC: POD 7/17 left CEA and left carotid stent placement      Subjective     Interval History: Now in [facility]. Nursing states she is overall cooperative but still has verbalization difficulties    ROS:  C/o left neck pain    Objective     Vital signs in last 24 hours:  Temp:  [97.6 °F (36.4 °C)] 97.6 °F (36.4 °C)  Heart Rate:  [65] 65  Resp:  [18] 18  BP: (151)/(57) 151/57    Intake/Output this shift:  I/O this shift:  In: 240 [P.O.:240]  Out: -       Intake/Output last 3 shifts:  No intake/output data recorded.    LABS:  None today     IMAGING STUDIES:  No new imaging    I personally viewed and interpreted the patient's chart    Meds reviewed/changed: Yes    Current Facility-Administered Medications:   •  acetaminophen (TYLENOL) tablet 1,000 mg, 1,000 mg, Oral, Q6H PRN, Jaylen Heaton III, MD  •  aluminum-magnesium hydroxide-simethicone (MAALOX MAX) 400-400-40 MG/5ML suspension 15 mL, 15 mL, Oral, Q6H PRN, Jaylen Heaton III, MD  •  amLODIPine (NORVASC) tablet 5 mg, 5 mg, Oral, BID, Edmond Kennedy MD, 5 mg at 09/03/19 0805  •  apixaban (ELIQUIS) tablet 5 mg, 5 mg, Oral, Q12H, Jaylen Heaton III, MD, 5 mg at 09/03/19 0805  •  aspirin tablet 325 mg, 325 mg, Oral, Daily, Jaylen Heaton III, MD, 325 mg at 09/03/19 0805  •  atorvastatin (LIPITOR) tablet 80 mg, 80 mg, Oral, Nightly, Jaylen Heaton III, MD, 80 mg at 09/02/19 2024  •  brimonidine (ALPHAGAN) 0.2 % ophthalmic solution 1 drop, 1 drop, Both Eyes, BID, Jaylen Heaton III, MD, 1 drop at 09/03/19 0805  •  clopidogrel (PLAVIX) tablet 75 mg, 75 mg, Oral, Daily, Jaylen Heaton III, MD, 75 mg at 09/03/19 0805  •  dextromethorphan-quinidine (NUEDEXTA) capsule 1 capsule, 1 capsule, Oral, Daily, Jaylen Heaton III, MD, 1  capsule at 09/03/19 0804  •  dextrose (D50W) 25 g/ 50mL Intravenous Solution 25 g, 25 g, Intravenous, Q15 Min PRN, Ajit Howard MD  •  dextrose (GLUTOSE) oral gel 15 g, 15 g, Oral, Q15 Min PRN, Ajit Howard MD  •  dorzolamide (TRUSOPT) 2 % ophthalmic solution 1 drop, 1 drop, Both Eyes, BID, Jaylen Heaton III, MD, 1 drop at 09/03/19 0805  •  gabapentin (NEURONTIN) capsule 100 mg, 100 mg, Oral, BID, Jaylen Heaton III, MD, 100 mg at 09/03/19 1207  •  glipiZIDE (GLUCOTROL) tablet 5 mg, 5 mg, Oral, BID AC, Edmond Kennedy MD, 5 mg at 09/03/19 0804  •  glucagon (human recombinant) (GLUCAGEN DIAGNOSTIC) injection 1 mg, 1 mg, Subcutaneous, PRN, Ajit Howard MD  •  hydrALAZINE (APRESOLINE) tablet 50 mg, 50 mg, Oral, Q8H, Edmond Kennedy MD, 50 mg at 09/03/19 0804  •  insulin lispro (humaLOG) injection 0-7 Units, 0-7 Units, Subcutaneous, 4x Daily With Meals & Nightly, Ajit Howard MD, 2 Units at 09/03/19 1207  •  losartan (COZAAR) 100 mg, hydrochlorothiazide (MICROZIDE) 12.5 mg for HYZAAR 100-12.5, , Oral, Daily, Edmond Kennedy MD  •  melatonin tablet 3 mg, 3 mg, Oral, Nightly, Jaylen Heaton III, MD, 3 mg at 09/02/19 2024  •  metFORMIN (GLUCOPHAGE) tablet 1,000 mg, 1,000 mg, Oral, BID With Meals, Jaylen Heaton III, MD, 1,000 mg at 09/03/19 0804  •  nebivolol (BYSTOLIC) tablet 20 mg, 20 mg, Oral, Daily, Edmond Kennedy MD, 20 mg at 09/03/19 0804  •  nitroglycerin (NITROSTAT) SL tablet 0.4 mg, 0.4 mg, Sublingual, Q5 Min PRN, Ajit Howard MD  •  OLANZapine (zyPREXA) injection 5 mg, 5 mg, Intramuscular, Q8H PRN, Jaylen Heaton III, MD, 5 mg at 08/31/19 2121  •  OLANZapine (zyPREXA) tablet 2.5 mg, 2.5 mg, Oral, Nightly, Jaylen Heaton III, MD, 2.5 mg at 09/02/19 2024  •  pantoprazole (PROTONIX) EC tablet 40 mg, 40 mg, Oral, BID AC, Jaylen Heaton III, MD, 40 mg at 09/02/19  "1049  •  PARoxetine (PAXIL) tablet 10 mg, 10 mg, Oral, Daily, Jaylen Heaton III, MD, 10 mg at 09/03/19 0805  •  potassium chloride (KLOR-CON) packet 20 mEq, 20 mEq, Oral, Daily With Breakfast, Jaylen Heaton III, MD, 20 mEq at 09/03/19 0805  •  [START ON 9/5/2019] vitamin D (ERGOCALCIFEROL) capsule 50,000 Units, 50,000 Units, Oral, Q7 Days, Jaylen Heaton III, MD      Physical Exam:    General:   Eyes open, alert, tracking.  Smiled     Intermittently. Followed commands and attempted to answer some questions- mainly one word answers; severe expressive aphasia  Neck:    Left CEA incision well healed; kamala carotid bruits  CN III IV VI: EOMI-tracks movement in room  CN VII: Right facial weakness- mild    Motor:    HERNANDEZ- right arm with some weakness    Assessment/Plan     ASSESSMENT:      Dysthymic disorder      PLAN: Alert and cooperative today.  Still with severe expressive aphasia. Hopefully to [facility] soon as that is the documented plan. Nothing further to add at this time. Keep follow up in 2 months with carotid doppler study.    I discussed the patients findings and my recommendations with patient and nursing staff       LOS: 5 days       Greta Swan, APRN  9/3/2019  12:52 PM    \"Dictated utilizing Dragon dictation\".      "

## 2019-09-03 NOTE — PROGRESS NOTES
The patient was notably agitated with staff last evening and continues to show frustration her inability to verbalize her needs.  She did comply will likely with medications.  I will add low-dose Neurontin in hopes of addressing some of the patient's ongoing anxiety.

## 2019-09-03 NOTE — PLAN OF CARE
Problem: Skin Injury Risk (Adult)  Goal: Identify Related Risk Factors and Signs and Symptoms  Outcome: Outcome(s) achieved Date Met: 09/03/19 09/03/19 0645   Skin Injury Risk (Adult)   Related Risk Factors (Skin Injury Risk) advanced age;cognitive impairment;mobility impaired       Problem: Fall Risk (Adult)  Goal: Identify Related Risk Factors and Signs and Symptoms  Outcome: Outcome(s) achieved Date Met: 09/03/19 09/03/19 0645   Fall Risk (Adult)   Related Risk Factors (Fall Risk) age-related changes;confusion/agitation;gait/mobility problems   Signs and Symptoms (Fall Risk) presence of risk factors

## 2019-09-03 NOTE — PLAN OF CARE
Problem: Patient Care Overview  Goal: Plan of Care Review   09/03/19 8354   OTHER   Outcome Summary Pt willing to participate w PT this am. Pt ambulated down hallway to breakfast w SBA and no losses of balance.   Coping/Psychosocial   Plan of Care Reviewed With patient

## 2019-09-03 NOTE — PROGRESS NOTES
Continued Stay Note  [facility]     Patient Name: Darby Workman  MRN: [id number]  Today's Date: 9/3/2019    Admit Date: 8/29/2019    Discharge Plan     Row Name 09/03/19 1455       Plan    Plan Comments  SW called and spoke w/pt's daughter, Silva to discuss d/c plans.  Pt's daughter stated that the plan is still for pt to transition to [facility] upon d/c.  Pt's daughter discussed living arrangementas & stated that pt's spouse is the caretaker and is unable to care for her at this time.  She stated that it is hopeful that pt will improve w/rehab and return home.  SW advised that she would contact [facility] to discuss pt transitioning to their facility.  SW contacted Laura Hernandes liaison for [facility] to discuss placement.  Ms. Hernandes stated that they would like to review pt's file; SW advised that pt's file would be placed in their inbox.  SW advised that pt participated in PT today.  AYSHA also advised that pt could be ready for d/c in a couple of days.        Discharge Codes    No documentation.             SAMIR Buchanan

## 2019-09-03 NOTE — DISCHARGE PLACEMENT REQUEST
"Noman Mcdonald (74 y.o. Female)     Date of Birth Social Security Number Address Home Phone MRN    1945  [id number] [address] [phone] [id number]    Confucianist Marital Status          Yarsani        Admission Date Admission Type Admitting Provider Attending Provider Department, Room/Bed    8/29/19 Emergency Jaylen Heaton III, MD [place], Charles [place] III, MD [facility] CRISIS MGMT, 3330/1    Discharge Date Discharge Disposition Discharge Destination                       Attending Provider:  Jaylen Heaton III, MD    Allergies:  No Known Allergies    Isolation:  Contact   Infection:  ESBL E coli (08/20/19)   Code Status:  CPR    Ht:  165.1 cm (65\")   Wt:  --    Admission Cmt:  None   Principal Problem:  None                Active Insurance as of 8/29/2019     Primary Coverage     Payor Plan Insurance Group Employer/Plan Group    [facility] [id number]     Payor Plan Address Payor Plan Phone Number Payor Plan Fax Number Effective Dates    [address]   4/1/2018 - None Entered    [facility] [id number]       Subscriber Name Subscriber Birth Date Member ID       DANIEL MCDONALD W 2/6/1946 [id number]           Secondary Coverage     Payor Plan Insurance Group Employer/Plan Group    MEDICARE MEDICARE A ONLY      Payor Plan Address Payor Plan Phone Number Payor Plan Fax Number Effective Dates    [address] [phone]  10/1/2010 - None Entered    [facility] [id number]       Subscriber Name Subscriber Birth Date Member ID       NOMAN MCDONALD 1945 [id number]                 Emergency Contacts      (Rel.) Home Phone Work Phone Mobile Phone    eder mcdonald (Daughter) [phone]    Daniel Mcdonald (Spouse) [phone] -- --              "

## 2019-09-03 NOTE — THERAPY TREATMENT NOTE
Acute Care - Physical Therapy Treatment Note  [facility]     Patient Name: Darby Workman  : 1945  MRN: [id number]  Today's Date: 9/3/2019             Admit Date: 2019    Visit Dx:  No diagnosis found.  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)   • Right-sided headache   • Acute ischemic stroke (CMS/HCC)   • Embolic stroke (CMS/HCC)   • Cerebral infarction due to stenosis of left carotid artery (CMS/HCC)   • Anticoagulated by anticoagulation treatment   • Stroke (cerebrum) (CMS/HCC)   • Dysthymic disorder       Therapy Treatment    Rehabilitation Treatment Summary     Row Name 19             Treatment Time/Intention    Discipline  physical therapist  -MD      Document Type  therapy note (daily note)  -MD      Subjective Information  no complaints  -MD      Mode of Treatment  physical therapy  -MD      Patient/Family Observations  Pt supine in bed showing no signs of acute distress.  -MD      Therapy Frequency (PT Clinical Impression)  3 times/wk  -MD      Existing Precautions/Restrictions  fall  -MD      Recorded by [MD] Mira Chadwick, PT 19      Row Name 19             Sit-Stand Transfer    Sit-Stand [place] (Transfers)  supervision  -MD      Recorded by [MD] Mira Chadwick, PT 19      Row Name 19             Stand-Sit Transfer    Stand-Sit [place] (Transfers)  supervision  -MD      Recorded by [MD] Mira Chadwick, PT 19      Row Name 19             Gait/Stairs Assessment/Training    [place] Level (Gait)  stand by assist  -MD      Distance in Feet (Gait)  150  -MD      Pattern (Gait)  step-through  -MD      Deviations/Abnormal Patterns (Gait)  festinating/shuffling;gait speed decreased  -MD      Bilateral Gait Deviations  forward flexed posture   -MD      Recorded by [MD] Mira Chadwick, PT 09/03/19 0922      Row Name 09/03/19 0917             Dynamic Sitting Balance    Level of [place], Reaches Outside Midline (Sitting, Dynamic Balance)  supervision  -MD      Sitting Position, Reaches Outside Midline (Sitting, Dynamic Balance)  sitting on edge of bed  -MD      Comment, Reaches Outside Midline (Sitting, Dynamic Balance)  doffing and donning socks while sitting EOB  -MD      Recorded by [MD] Mira Chadwick, PT 09/03/19 0922      Row Name 09/03/19 0917             Positioning and Restraints    Pre-Treatment Position  in bed  -MD      Post Treatment Position  chair  -MD      In Chair  sitting;patient within staff view  -MD      Recorded by [MD] Mira Chadwick, PT 09/03/19 0922      Row Name 09/03/19 0917             Pain Scale: Numbers Pre/Post-Treatment    Pain Scale: Numbers, Pretreatment  0/10 - no pain  -MD      Recorded by [MD] Mira Chadwick, PT 09/03/19 0922      Row Name                Wound 07/17/19 1015 Left neck incision    Wound - Properties Group Date first assessed: 07/17/19 [LD] Time first assessed: 1015 [LD] Side: Left [LD] Location: neck [LD] Type: incision [LD] Recorded by:  [LD] Martha Foster RN 07/17/19 1015      User Key  (r) = Recorded By, (t) = Taken By, (c) = Cosigned By    Initials Name Effective Dates Discipline    Mira Vaca, PT 04/03/18 -  PT    Martha Samuel RN 06/16/16 -  Nurse          Wound 07/17/19 1015 Left neck incision (Active)   Dressing Appearance open to air 9/2/2019  8:00 PM   Closure Liquid skin adhesive 9/2/2019  8:00 PM   Base dry;clean 9/2/2019  8:00 PM   Periwound ecchymotic 9/2/2019  8:00 PM   Drainage Amount none 9/2/2019  8:00 PM   Dressing Care, Wound open to air 9/2/2019  8:00 PM           Physical Therapy Education     Title: PT OT SLP Therapies (Not Started)     Topic: Physical Therapy (In Progress)     Point: Body mechanics (In Progress)     Learning Progress Summary           Patient Acceptance, E, NR by YAKOV  at 8/29/2019  7:32 PM                   Point: Precautions (In Progress)     Learning Progress Summary           Patient Acceptance, E, NR by MD at 9/3/2019  9:22 AM    Acceptance, E, NR,NL by MD at 8/30/2019  2:29 PM                               User Key     Initials Effective Dates Name Provider Type Discipline    MD 04/03/18 -  Mira Chadwick, PT Physical Therapist PT    YAKOV 02/15/18 -  Danilo Rosa, RN Registered Nurse Nurse                PT Recommendation and Plan  Anticipated Discharge Disposition (PT): skilled nursing facility  Therapy Frequency (PT Clinical Impression): 3 times/wk  Outcome Summary/Treatment Plan (PT)  Anticipated Discharge Disposition (PT): skilled nursing facility  Plan of Care Reviewed With: patient  Outcome Summary: Pt willing to participate w PT this am.  Pt ambulated down hallway to breakfast w SBA and no losses of balance.  Outcome Measures     Row Name 09/03/19 0900             How much help from another person do you currently need...    Turning from your back to your side while in flat bed without using bedrails?  4  -MD      Moving from lying on back to sitting on the side of a flat bed without bedrails?  4  -MD      Moving to and from a bed to a chair (including a wheelchair)?  3  -MD      Standing up from a chair using your arms (e.g., wheelchair, bedside chair)?  3  -MD      Climbing 3-5 steps with a railing?  3  -MD      To walk in hospital room?  3  -MD      AM-PAC 6 Clicks Score (PT)  20  -MD         Functional Assessment    Outcome Measure Options  AM-PAC 6 Clicks Basic Mobility (PT)  -MD        User Key  (r) = Recorded By, (t) = Taken By, (c) = Cosigned By    Initials Name Provider Type    Mira Vaca, PT Physical Therapist         Time Calculation:   PT Charges     Row Name 09/03/19 0917             Time Calculation    Start Time  0905  -MD      Stop Time  0915  -MD      Time Calculation (min)  10 min  -MD      PT Received On  09/03/19  -MD      PT - Next Appointment   09/04/19  -MD        User Key  (r) = Recorded By, (t) = Taken By, (c) = Cosigned By    Initials Name Provider Type    Mira Vaca, PT Physical Therapist        Therapy Charges for Today     Code Description Service Date Service Provider Modifiers Qty    80267788249 HC PT THER PROC EA 15 MIN 9/3/2019 Mira Chadwick, PT GP 1          PT G-Codes  Outcome Measure Options: AM-PAC 6 Clicks Basic Mobility (PT)  AM-PAC 6 Clicks Score (PT): 20  AM-PAC 6 Clicks Score (OT): 14    Mira Chadwick PT  9/3/2019

## 2019-09-03 NOTE — PLAN OF CARE
Problem: Patient Care Overview  Goal: Plan of Care Review  Outcome: Ongoing (interventions implemented as appropriate)   09/03/19 0331   Plan of Care Review   Progress no change   OTHER   Outcome Summary Patient very agitated with staff, unable to verbalize her needs, took all her meds reluctantly, appears to have a headache but refuses medication for it, slept well through the night, will continue to monitor.   Coping/Psychosocial   Plan of Care Reviewed With patient       Problem: Fall Risk (Adult)  Goal: Identify Related Risk Factors and Signs and Symptoms  Outcome: Ongoing (interventions implemented as appropriate)    Goal: Absence of Fall  Outcome: Ongoing (interventions implemented as appropriate)

## 2019-09-03 NOTE — PLAN OF CARE
Problem: Patient Care Overview  Goal: Individualization and Mutuality  Outcome: Ongoing (interventions implemented as appropriate)    Goal: Interprofessional Rounds/Family Conf  Outcome: Ongoing (interventions implemented as appropriate)   09/03/19 1051   Interdisciplinary Rounds/Family Conf   Summary Treatment team met to review pt's plan of care. Pt's plan is to transition to [facility] [place] at d/c. Pt's progress & svcs needed will be assessed ongoing.   Interdisciplinary Rounds/Family Conf   Participants ;art therapy;nursing;psychiatrist;social work     Patient/Guardian Signature: __________________________________            Psychiatrist Signature: ______________________________________             Therapist Signature: ________________________________________         Nurse Signature: ___________________________________________          Staff Signature: ____________________________________________            Staff Signature: ____________________________________________          Staff Signature: ____________________________________________          Staff Signature:                                                                                                      Problem: Decreased Participation/Engagement (Depressive Signs/Symptoms) (Adult)  Goal: Increased Participation/Engagement (Depressive Signs/Symptoms)  Outcome: Ongoing (interventions implemented as appropriate)      Problem: Social/Occupational/Functional Impairment (Depressive Signs/Symptoms) (Adult)  Goal: Improved Social/Occupational/Functional Skills (Depressive Signs/Symptoms)  Outcome: Ongoing (interventions implemented as appropriate)      Problem: Psychomotor Impairment (Depressive Signs/Symptoms) (Adult)  Goal: Improved Psychomotor Symptoms (Depressive Signs/Symptoms)  Outcome: Ongoing (interventions implemented as appropriate)      Problem: Cognitive Impairment (Dementia Signs/Symptoms) (Adult)  Goal: Optimized Cognitive Function  (Dementia Signs/Symptoms)  Outcome: Ongoing (interventions implemented as appropriate)      Problem: Behavioral Impairment (Dementia Signs/Symptoms) (Adult)  Goal: Improved Behavioral Control (Dementia Signs/Symptoms)  Outcome: Ongoing (interventions implemented as appropriate)      Problem: Psychological Impairment (Dementia Signs/Symptoms) (Adult)  Goal: Improved Psychological Symptoms (Dementia Signs/Symptoms)  Outcome: Ongoing (interventions implemented as appropriate)

## 2019-09-04 PROBLEM — N17.9 AKI (ACUTE KIDNEY INJURY) (HCC): Status: ACTIVE | Noted: 2019-09-04

## 2019-09-04 PROBLEM — I95.9 HYPOTENSION: Status: ACTIVE | Noted: 2019-09-04

## 2019-09-04 LAB
ANION GAP SERPL CALCULATED.3IONS-SCNC: 15.4 MMOL/L (ref 5–15)
BUN BLD-MCNC: 29 MG/DL (ref 8–23)
BUN/CREAT SERPL: 22.5 (ref 7–25)
CALCIUM SPEC-SCNC: 9 MG/DL (ref 8.6–10.5)
CHLORIDE SERPL-SCNC: 102 MMOL/L (ref 98–107)
CO2 SERPL-SCNC: 19.6 MMOL/L (ref 22–29)
CREAT BLD-MCNC: 1.29 MG/DL (ref 0.57–1)
GFR SERPL CREATININE-BSD FRML MDRD: 40 ML/MIN/1.73
GLUCOSE BLD-MCNC: 103 MG/DL (ref 65–99)
GLUCOSE BLDC GLUCOMTR-MCNC: 112 MG/DL (ref 70–130)
GLUCOSE BLDC GLUCOMTR-MCNC: 136 MG/DL (ref 70–130)
GLUCOSE BLDC GLUCOMTR-MCNC: 89 MG/DL (ref 70–130)
POTASSIUM BLD-SCNC: 4.4 MMOL/L (ref 3.5–5.2)
SODIUM BLD-SCNC: 137 MMOL/L (ref 136–145)

## 2019-09-04 PROCEDURE — 82962 GLUCOSE BLOOD TEST: CPT

## 2019-09-04 PROCEDURE — 97110 THERAPEUTIC EXERCISES: CPT

## 2019-09-04 PROCEDURE — 93005 ELECTROCARDIOGRAM TRACING: CPT | Performed by: NURSE PRACTITIONER

## 2019-09-04 PROCEDURE — 80048 BASIC METABOLIC PNL TOTAL CA: CPT | Performed by: NURSE PRACTITIONER

## 2019-09-04 PROCEDURE — 93010 ELECTROCARDIOGRAM REPORT: CPT | Performed by: INTERNAL MEDICINE

## 2019-09-04 RX ORDER — LOSARTAN POTASSIUM 100 MG/1
100 TABLET ORAL
Status: DISCONTINUED | OUTPATIENT
Start: 2019-09-05 | End: 2019-09-04

## 2019-09-04 RX ORDER — HYDRALAZINE HYDROCHLORIDE 25 MG/1
25 TABLET, FILM COATED ORAL EVERY 8 HOURS SCHEDULED
Status: DISCONTINUED | OUTPATIENT
Start: 2019-09-04 | End: 2019-09-13 | Stop reason: HOSPADM

## 2019-09-04 RX ORDER — LOSARTAN POTASSIUM 100 MG/1
100 TABLET ORAL
Status: DISCONTINUED | OUTPATIENT
Start: 2019-09-05 | End: 2019-09-13 | Stop reason: HOSPADM

## 2019-09-04 RX ADMIN — APIXABAN 5 MG: 5 TABLET, FILM COATED ORAL at 09:03

## 2019-09-04 RX ADMIN — METFORMIN HYDROCHLORIDE 1000 MG: 1000 TABLET ORAL at 08:59

## 2019-09-04 RX ADMIN — PAROXETINE HYDROCHLORIDE 10 MG: 10 TABLET, FILM COATED ORAL at 09:00

## 2019-09-04 RX ADMIN — DORZOLAMIDE HYDROCHLORIDE 1 DROP: 20 SOLUTION/ DROPS OPHTHALMIC at 09:07

## 2019-09-04 RX ADMIN — LOSARTAN POTASSIUM: 50 TABLET, FILM COATED ORAL at 09:00

## 2019-09-04 RX ADMIN — BRIMONIDINE TARTRATE 1 DROP: 2 SOLUTION OPHTHALMIC at 09:07

## 2019-09-04 RX ADMIN — GLIPIZIDE 5 MG: 5 TABLET ORAL at 09:02

## 2019-09-04 RX ADMIN — DEXTROMETHORPHAN HYDROBROMIDE AND QUINIDINE SULFATE 1 CAPSULE: 20; 10 CAPSULE, GELATIN COATED ORAL at 09:03

## 2019-09-04 RX ADMIN — POTASSIUM CHLORIDE 20 MEQ: 1.5 POWDER, FOR SOLUTION ORAL at 08:57

## 2019-09-04 RX ADMIN — SODIUM CHLORIDE 1000 ML: 9 INJECTION, SOLUTION INTRAVENOUS at 16:56

## 2019-09-04 RX ADMIN — AMLODIPINE BESYLATE 5 MG: 5 TABLET ORAL at 09:04

## 2019-09-04 RX ADMIN — PANTOPRAZOLE SODIUM 40 MG: 40 TABLET, DELAYED RELEASE ORAL at 17:23

## 2019-09-04 RX ADMIN — ASPIRIN 325 MG: 325 TABLET ORAL at 09:04

## 2019-09-04 RX ADMIN — CLOPIDOGREL 75 MG: 75 TABLET, FILM COATED ORAL at 09:03

## 2019-09-04 RX ADMIN — GABAPENTIN 100 MG: 100 CAPSULE ORAL at 09:03

## 2019-09-04 RX ADMIN — HYDRALAZINE HYDROCHLORIDE 50 MG: 50 TABLET, FILM COATED ORAL at 09:02

## 2019-09-04 RX ADMIN — GLIPIZIDE 5 MG: 5 TABLET ORAL at 17:23

## 2019-09-04 RX ADMIN — NEBIVOLOL HYDROCHLORIDE 20 MG: 10 TABLET ORAL at 08:58

## 2019-09-04 RX ADMIN — METFORMIN HYDROCHLORIDE 1000 MG: 1000 TABLET ORAL at 17:23

## 2019-09-04 NOTE — PROGRESS NOTES
Progress Note    Name: Darby Workman ADMIT: 2019   : 1945  PCP: Eric Matta MD    MRN: [id number] LOS: 6 days   AGE/SEX: 74 y.o. female  ROOM: [facility]   Date of Encounter Visit: 19    Subjective:     Interval History: we were asked to re-consult due to hypotension. /64 this am and given meds, repeat BP around 1300 was 79/59 and 84/48. She was given oral fluids and BP increased to 99/58 and was asymptomatic    REVIEW OF SYSTEMS:   Difficult to assess given expressive aphasia  Denies any f/c, headache, visual changes or dizziness  No chest pain, palpitations, shortness of breath, cough or sputum.   No N/V/D No abdominal pain or blood.       Objective:   Temp:  [97.2 °F (36.2 °C)-99.8 °F (37.7 °C)] 97.2 °F (36.2 °C)  Heart Rate:  [70-77] 77  Resp:  [18] 18  BP: ()/(41-71) 96/56   SpO2:  [94 %-100 %] 96 %  on    Device (Oxygen Therapy): room air    Intake/Output Summary (Last 24 hours) at 2019 1529  Last data filed at 2019 0936  Gross per 24 hour   Intake 260 ml   Output 2 ml   Net 258 ml     There is no height or weight on file to calculate BMI.  There were no vitals filed for this visit.  Weight change:     Physical Exam   Constitutional: No distress.   HENT:   Head: Atraumatic.   Nose: Nose normal.   Eyes: Conjunctivae are normal.   Cardiovascular: Normal rate and regular rhythm.   No murmur heard.  Bounding pulse   Pulmonary/Chest: Effort normal. She has no wheezes. She has no rales.   Abdominal: Soft. Bowel sounds are normal. There is no tenderness.   Musculoskeletal: She exhibits edema (trace ankle).   Neurological: She is alert.   Expressive aphasia, but able to answer with 2-3 words   Skin: Skin is warm and dry. She is not diaphoretic.     Results Review:      Results from last 7 days   Lab Units 19  1450 19  0644   SODIUM mmol/L 137 139   POTASSIUM mmol/L 4.4 3.6   CHLORIDE mmol/L 102 105   CO2 mmol/L 19.6* 25.5   BUN mg/dL 29* 21   CREATININE mg/dL 1.29*  0.66   GLUCOSE mg/dL 103* 111*   CALCIUM mg/dL 9.0 8.9     CrCl cannot be calculated (Unknown ideal weight.).      Results from last 7 days   Lab Units 09/04/19  1205 09/04/19  0743 09/03/19  1939 09/03/19  1650 09/03/19  1146 09/03/19  0729 09/02/19  2020 09/02/19  1710   GLUCOSE mg/dL 136* 112 148* 83 157* 104 160* 89                       Invalid input(s):  PHOS        Invalid input(s): LDLCALC  Results from last 7 days   Lab Units 09/02/19  0644   WBC 10*3/mm3 5.16   HEMOGLOBIN g/dL 9.8*   HEMATOCRIT % 31.4*   PLATELETS 10*3/mm3 246   MCV fL 92.4   MCH pg 28.8   MCHC g/dL 31.2*   RDW % 14.6   RDW-SD fl 49.2   MPV fL 10.6   NEUTROPHIL % % 70.2   LYMPHOCYTE % % 17.4*   MONOCYTES % % 7.9   EOSINOPHIL % % 3.3   BASOPHIL % % 0.8   IMM GRAN % % 0.4   NEUTROS ABS 10*3/mm3 3.62   LYMPHS ABS 10*3/mm3 0.90   MONOS ABS 10*3/mm3 0.41   EOS ABS 10*3/mm3 0.17   BASOS ABS 10*3/mm3 0.04   IMMATURE GRANS (ABS) 10*3/mm3 0.02   NRBC /100 WBC 0.0                                           Imaging:  Imaging Results (last 24 hours)     ** No results found for the last 24 hours. **          I reviewed the patient's new clinical results and medications.         Medication Review:   Scheduled Meds:  apixaban 5 mg Oral Q12H   aspirin 325 mg Oral Daily   atorvastatin 80 mg Oral Nightly   brimonidine 1 drop Both Eyes BID   clopidogrel 75 mg Oral Daily   dextromethorphan-quinidine 1 capsule Oral Daily   dorzolamide 1 drop Both Eyes BID   gabapentin 100 mg Oral BID   glipiZIDE 5 mg Oral BID AC   hydrALAZINE 50 mg Oral Q8H   insulin lispro 0-7 Units Subcutaneous 4x Daily With Meals & Nightly   losartan-HCTZ (HYZAAR) 100-12.5 combo dose  Oral Daily   melatonin 3 mg Oral Nightly   metFORMIN 1,000 mg Oral BID With Meals   nebivolol 20 mg Oral Daily   OLANZapine 2.5 mg Oral Nightly   pantoprazole 40 mg Oral BID AC   PARoxetine 10 mg Oral Daily   potassium chloride 20 mEq Oral Daily With Breakfast   [START ON 9/5/2019] vitamin D 50,000 Units Oral  Q7 Days     Continuous Infusions:   PRN Meds:.•  acetaminophen  •  aluminum-magnesium hydroxide-simethicone  •  dextrose  •  dextrose  •  glucagon (human recombinant)  •  nitroglycerin  •  OLANZapine    Problem List:     Active Hospital Problems    Diagnosis  POA   • KIERAN (acute kidney injury) (CMS/HCC) [N17.9]  Unknown   • Hypotension [I95.9]  Unknown   • Dysthymic disorder [F34.1]  Yes      Resolved Hospital Problems    Diagnosis Date Resolved POA   • Cerebral infarction due to stenosis of left carotid artery (CMS/[facility]) [I63.232] 09/03/2019 Yes       Assessment and Plan:     ·   Dysthymic disorder- per psychiatry. Check EKG to eval QT interval since recently started on dextromethorphan/quinidine   ·   KIERAN (acute kidney injury) (CMS/[facility])-Baseline creatinine 0.6 and now up to 1.24.  Likely from hypotension and multiple medications.  Will give 1 L fluid bolus over 4 hours and repeat BMP in a.m.  Will hold HCTZ  ·   Hypotension with history of hypertension.  Stop Norvasc and HCTZ.  Switch losartan to p.m. given higher p.m. blood pressure compared to a.m. And KIERAN    I discussed the patients findings and my recommendations with patient and nursing staff. Will follow.    GETACHEW Bronson  09/04/19  3:29 PM

## 2019-09-04 NOTE — PLAN OF CARE
Problem: Patient Care Overview  Goal: Plan of Care Review  Outcome: Ongoing (interventions implemented as appropriate)   09/04/19 1840   Plan of Care Review   Progress improving   OTHER   Outcome Summary Patient pleasent and cooperative today. Shakes head/no to answers. Denied SI, HI, or pain. Showed some frustration today. Low blood pressure today, improved. Doctor notified. Normal saline ordered and started.Cooperative with medications whole in pudding. did refuse morning protononix but this was after taking multiple other medications and the patient was frustrated at having to take too many. Continuing to monitor.    Coping/Psychosocial   Plan of Care Reviewed With patient   Coping/Psychosocial   Patient Agreement with Plan of Care agrees     Goal: Individualization and Mutuality  Outcome: Ongoing (interventions implemented as appropriate)    Goal: Discharge Needs Assessment  Outcome: Ongoing (interventions implemented as appropriate)    Goal: Interprofessional Rounds/Family Conf  Outcome: Ongoing (interventions implemented as appropriate)      Problem: Overarching Goals (Adult)  Goal: Adheres to Safety Considerations for Self and Others  Outcome: Ongoing (interventions implemented as appropriate)   09/04/19 1840   Overarching Goals (Adult)   Adheres to Safety Considerations for Self and Others making progress toward outcome     Intervention: Develop and Maintain Individualized Safety Plan   09/04/19 1020   C-SSRS (Screen-Recent) Past Month   Q1 Wished to be Dead (Past Month) no   Q2 Suicidal Thoughts (Past Month) no   Q6 Suicide Behavior (Lifetime) no   Violence Risk   Feels Like Hurting Others no   Develop and Maintain Individualized Safety Plan   Safety Measures safety rounds completed;suicide assessment completed       Goal: Optimized Coping Skills in Response to Life Stressors  Outcome: Ongoing (interventions implemented as appropriate)   09/04/19 1840   Overarching Goals (Adult)   Optimized Coping Skills in  Response to Life Stressors making progress toward outcome     Intervention: Promote Effective Coping Strategies   09/04/19 1020   Coping/Psychosocial Interventions   Supportive Measures active listening utilized;relaxation techniques promoted;self-care encouraged;self-reflection promoted;self-responsibility promoted;verbalization of feelings encouraged       Goal: Develops/Participates in Therapeutic [place] to Support Successful Transition  Outcome: Ongoing (interventions implemented as appropriate)   09/04/19 1840   Overarching Goals (Adult)   Develops/Participates in Therapeutic [place] to Support Successful Transition making progress toward outcome     Intervention: Foster Therapeutic [place]   09/04/19 1020   Interventions   Trust Relationship/Rapport care explained;choices provided;emotional support provided;empathic listening provided;questions answered;questions encouraged;reassurance provided;thoughts/feelings acknowledged         Problem: Decreased Participation/Engagement (Depressive Signs/Symptoms) (Adult)  Goal: Increased Participation/Engagement (Depressive Signs/Symptoms)  Outcome: Ongoing (interventions implemented as appropriate)   09/04/19 1840   Increased Participation/Engagement (Depressive Signs/Symptoms)   Increased Participation/Engagement Time Frame for Action Step (STG) 3 days   Increased Participation/Engagement Action Step (STG) Outcome making progress toward outcome       Problem: Social/Occupational/Functional Impairment (Depressive Signs/Symptoms) (Adult)  Goal: Improved Social/Occupational/Functional Skills (Depressive Signs/Symptoms)  Outcome: Ongoing (interventions implemented as appropriate)   09/04/19 1840   Improved Social/Occupational/Functional Skills (Depressive Signs/Symptoms)   Improved Social/Occupational/Functional Skills Time Frame for Action Step (STG) 3 days   Improved Social/Occupational/Functional Skills Action Step (STG) Outcome making progress toward outcome        Problem: Psychomotor Impairment (Depressive Signs/Symptoms) (Adult)  Goal: Improved Psychomotor Symptoms (Depressive Signs/Symptoms)  Outcome: Ongoing (interventions implemented as appropriate)   09/04/19 1840   Improved Psychomotor Symptoms (Depressive Signs/Symptoms)   Improved Psychomotor Symptoms Time Frame for Action Step (STG) 3 days   Improved Psychomotor Symptoms Action Step (STG) Outcome making progress toward outcome       Problem: Cognitive Impairment (Dementia Signs/Symptoms) (Adult)  Goal: Optimized Cognitive Function (Dementia Signs/Symptoms)  Outcome: Ongoing (interventions implemented as appropriate)   09/04/19 1840   Optimized Cognitive Function (Dementia Signs/Symptoms)   Optimized Cognitive Ability Time Frame for Action Step (STG) 3 days   Optimized Cognitive Ability Action Step (STG) Outcome making progress toward outcome       Problem: Behavioral Impairment (Dementia Signs/Symptoms) (Adult)  Goal: Improved Behavioral Control (Dementia Signs/Symptoms)  Outcome: Ongoing (interventions implemented as appropriate)   09/04/19 1840   Improved Behavioral Control (Dementia Signs/Symptoms)   Improved Behavioral Control Time Frame for Action Step (STG) 3 days   Improved Behavioral Control Action Step (STG) Outcome making progress toward outcome       Problem: Psychological Impairment (Dementia Signs/Symptoms) (Adult)  Goal: Improved Psychological Symptoms (Dementia Signs/Symptoms)  Outcome: Ongoing (interventions implemented as appropriate)   09/04/19 1840   Improved Psychological Symptoms (Dementia Signs/Symptoms)   Improved Psychologic Symptoms Time Frame for Action Step (STG) 3 days   Improved Psychological Symptoms Action Step (STG) Outcome making progress toward outcome       Problem: Skin Injury Risk (Adult)  Goal: Skin Health and Integrity  Outcome: Ongoing (interventions implemented as appropriate)   09/04/19 1840   Skin Injury Risk (Adult)   Skin Health and Integrity making progress toward  outcome       Problem: Fall Risk (Adult)  Goal: Absence of Fall  Outcome: Ongoing (interventions implemented as appropriate)   09/04/19 1840   Fall Risk (Adult)   Absence of Fall making progress toward outcome

## 2019-09-04 NOTE — THERAPY TREATMENT NOTE
Acute Care - Physical Therapy Treatment Note  [facility]     Patient Name: Darby Workman  : 1945  MRN: [id number]  Today's Date: 2019             Admit Date: 2019    Visit Dx:  No diagnosis found.  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)   • Right-sided headache   • Acute ischemic stroke (CMS/HCC)   • Embolic stroke (CMS/HCC)   • Anticoagulated by anticoagulation treatment   • Stroke (cerebrum) (CMS/HCC)   • Dysthymic disorder       Therapy Treatment    Rehabilitation Treatment Summary     Row Name 19 0935             Treatment Time/Intention    Discipline  physical therapist  -KELBY      Document Type  therapy note (daily note)  -KELBY      Subjective Information  no complaints  -KELBY      Mode of Treatment  physical therapy  -KELBY      Patient/Family Observations  Just prior to getting pt up, PT checked on pt and she said yes to wanting to walk but PT went back to get gloves on. Then pt was up walking out of her room on her own when PT came to get her up.  -KELBY      Therapy Frequency (PT Clinical Impression)  3 times/wk to 5x/week   -KELBY      Existing Precautions/Restrictions  fall  -KELBY      Recorded by [KELBY] Poppy Rosa, PT 19 0955      Row Name 1935             Cognitive Assessment/Intervention- PT/OT    Affect/Mental Status (Cognitive)  confused  -KELBY      Behavioral Issues (Cognitive)  overwhelmed easily  -KELBY      Orientation Status (Cognition)  unable/difficult to assess  -KELBY      Follows Commands (Cognition)  follows one step commands;50-74% accuracy;verbal cues/prompting required;initiation impaired;increased processing time needed  -KELBY      Cognitive Function (Cognitive)  attention deficit;safety deficit;memory deficit  -KELBY      Attention Deficit (Cognitive)  moderate deficit  -KELBY      Memory  Deficit (Cognitive)  unable/difficult to assess  -KELBY      Safety Deficit (Cognitive)  moderate deficit;ability to follow commands;impulsivity;awareness of need for assistance;insight into deficits/self awareness;safety precautions awareness  -KELBY      Recorded by [KELBY] Poppy Rosa, PT 09/04/19 0955      Row Name 09/04/19 0935             Safety Issues, Functional Mobility    Safety Issues Affecting Function (Mobility)  ability to follow commands  -KELBY      Recorded by [KELBY] Poppy Rosa, PT 09/04/19 0955      Row Name 09/04/19 0935             Bed Mobility Assessment/Treatment    Comment (Bed Mobility)  Pt had been in bed when PT first checked on her and when came back, pt was coming out of her room.   -KELBY      Recorded by [KELBY] Poppy Rosa, PT 09/04/19 0955      Row Name 09/04/19 0935             Sit-Stand Transfer    Sit-Stand [place] (Transfers)  supervision;conditional independence  -KELBY      Recorded by [KELBY] Poppy Rosa, PT 09/04/19 0955      Row Name 09/04/19 0935             Stand-Sit Transfer    Stand-Sit [place] (Transfers)  conditional independence;supervision  -KELBY      Recorded by [KELBY] Poppy Rosa, PT 09/04/19 0955      Row Name 09/04/19 0935             Gait/Stairs Assessment/Training    [place] Level (Gait)  stand by assist  -KELBY      Assistive Device (Gait)  -- pt refused HHA   -KELBY      Distance in Feet (Gait)  70' x 1; 60' x 1 with pt ambulating 10' intervals and turning around, repeated.   -KELBY      Deviations/Abnormal Patterns (Gait)  festinating/shuffling;gait speed decreased  -KELBY      Bilateral Gait Deviations  forward flexed posture arms held flexed at elbows in front of her.   -KELBY      Recorded by [KELBY] Poppy Rosa, PT 09/04/19 0955      Row Name 09/04/19 0935             Lower Extremity Seated Therapeutic Exercise    Performed, Seated Lower Extremity (Therapeutic Exercise)  LAQ (long arc quad), knee extension;hip flexion/extension  -KELBY      Exercise  Type, Seated Lower Extremity (Therapeutic Exercise)  AROM (active range of motion)  -KELBY      Sets/Reps Detail, Seated Lower Extremity (Therapeutic Exercise)  1/10  -KELBY      Comment, Seated Lower Extremity (Therapeutic Exercise)  visual and verbal cues to follow  -KELBY      Recorded by [KELBY] Poppy Rosa, PT 09/04/19 0955      Row Name 09/04/19 0935             Positioning and Restraints    Post Treatment Position  chair  -KELBY      In Chair  sitting;patient within staff view  -KELBY      Recorded by [KELBY] Poppy Rosa, PT 09/04/19 0955      Row Name 09/04/19 0935             Pain Scale: FACES Pre/Post-Treatment    Pain: FACES Scale, Pretreatment  0-->no hurt  -KELBY      Pain: FACES Scale, Post-Treatment  0-->no hurt  -KELBY      Recorded by [KELBY] Poppy Rosa, PT 09/04/19 0955      Row Name                Wound 07/17/19 1015 Left neck incision    Wound - Properties Group Date first assessed: 07/17/19 [LD] Time first assessed: 1015 [LD] Side: Left [LD] Location: neck [LD] Type: incision [LD] Recorded by:  [LD] Martha Foster, RN 07/17/19 1015      User Key  (r) = Recorded By, (t) = Taken By, (c) = Cosigned By    Initials Name Effective Dates Discipline    KELBY Poppy Rosa, PT 06/08/18 -  PT    LD Martha Foster, RN 06/16/16 -  Nurse          Wound 07/17/19 1015 Left neck incision (Active)   Dressing Appearance open to air 9/4/2019  1:15 AM   Closure Liquid skin adhesive 9/4/2019  1:15 AM   Base dry 9/4/2019  1:15 AM   Periwound Temperature warm 9/4/2019  1:15 AM   Drainage Amount none 9/4/2019  1:15 AM   Dressing Care, Wound open to air 9/4/2019  1:15 AM           Physical Therapy Education     Title: PT OT SLP Therapies (Not Started)     Topic: Physical Therapy (In Progress)     Point: Mobility training (Done)     Learning Progress Summary           Patient Acceptance, E,TB, NR,DU by KELBY at 9/4/2019  9:55 AM                   Point: Body mechanics (In Progress)     Learning Progress Summary           Patient  Acceptance, E, NR by YAKOV at 8/29/2019  7:32 PM                   Point: Precautions (In Progress)     Learning Progress Summary           Patient Acceptance, E, NR by MD at 9/3/2019  9:22 AM    Acceptance, E, NR,NL by MD at 8/30/2019  2:29 PM                               User Key     Initials Effective Dates Name Provider Type Discipline     06/08/18 -  Poppy Rosa, PT Physical Therapist PT    MD 04/03/18 -  Mira Chadwick, PT Physical Therapist PT    YAKOV 02/15/18 -  Danilo Rosa, RN Registered Nurse Nurse                PT Recommendation and Plan  Therapy Frequency (PT Clinical Impression): 3 times/wk(to 5x/week )  Plan of Care Reviewed With: patient  Progress: improving  Outcome Summary: Pt was able to follow seated exercise instructions with little cues.   Outcome Measures     Row Name 09/04/19 0935 09/03/19 0900          How much help from another person do you currently need...    Turning from your back to your side while in flat bed without using bedrails?  4  -KELBY  4  -MD     Moving from lying on back to sitting on the side of a flat bed without bedrails?  4  -KELBY  4  -MD     Moving to and from a bed to a chair (including a wheelchair)?  3  -KELBY  3  -MD     Standing up from a chair using your arms (e.g., wheelchair, bedside chair)?  3  -KELBY  3  -MD     Climbing 3-5 steps with a railing?  3  -KELBY  3  -MD     To walk in hospital room?  3  -KELBY  3  -MD     AM-PAC 6 Clicks Score (PT)  20  -KELBY  20  -MD        Functional Assessment    Outcome Measure Options  AM-PAC 6 Clicks Basic Mobility (PT)  -KELBY  AM-PAC 6 Clicks Basic Mobility (PT)  -MD       User Key  (r) = Recorded By, (t) = Taken By, (c) = Cosigned By    Initials Name Provider Type    KELBY Poppy Rosa, PT Physical Therapist    Mira Vaca, PT Physical Therapist         Time Calculation:   PT Charges     Row Name 09/04/19 0957             Time Calculation    Start Time  0935  -KELYB      Stop Time  0944  -KELBY      Time Calculation (min)  9 min  -KELBY      PT  Received On  09/04/19  -KELBY      PT - Next Appointment  09/05/19  -KELBY         Time Calculation- PT    Total Timed Code Minutes- PT  9 minute(s)  -KELBY        User Key  (r) = Recorded By, (t) = Taken By, (c) = Cosigned By    Initials Name Provider Type    Poppy Trevino, PT Physical Therapist        Therapy Charges for Today     Code Description Service Date Service Provider Modifiers Qty    65422507929 HC PT THER PROC EA 15 MIN 9/4/2019 Poppy Rosa, PT GP 1          PT G-Codes  Outcome Measure Options: AM-PAC 6 Clicks Basic Mobility (PT)  AM-PAC 6 Clicks Score (PT): 20  AM-PAC 6 Clicks Score (OT): 14    Poppy Rosa, HESHAM  9/4/2019

## 2019-09-04 NOTE — PLAN OF CARE
Problem: Patient Care Overview  Goal: Plan of Care Review  Outcome: Ongoing (interventions implemented as appropriate)   09/03/19 0331 09/04/19 0115 09/04/19 0457   Plan of Care Review   Progress no change --  --    OTHER   Outcome Summary --  --  Pt was cooperative with meds and did minimally participate in the assessment. Pt has minimal vocal interaction but does nod head yes or no to some questions. Pt reports frustration but denies SI/HI. Pt denies pain and does not respond to questions regarding anxiety and depression. L sided neck incision WNL. Pt had poor eye contact and required frequent requests to open eyes. Pt slept most of evening. 9/4/19 0501     Coping/Psychosocial   Plan of Care Reviewed With --  patient --    Coping/Psychosocial   Patient Agreement with Plan of Care --  agrees --        Problem: Overarching Goals (Adult)  Goal: Adheres to Safety Considerations for Self and Others  Outcome: Ongoing (interventions implemented as appropriate)   09/04/19 0457   Overarching Goals (Adult)   Adheres to Safety Considerations for Self and Others making progress toward outcome     Goal: Optimized Coping Skills in Response to Life Stressors  Outcome: Ongoing (interventions implemented as appropriate)   09/04/19 0457   Overarching Goals (Adult)   Optimized Coping Skills in Response to Life Stressors making progress toward outcome     Goal: Develops/Participates in Therapeutic [place] to Support Successful Transition  Outcome: Ongoing (interventions implemented as appropriate)   09/04/19 0457   Overarching Goals (Adult)   Develops/Participates in Therapeutic [place] to Support Successful Transition making progress toward outcome

## 2019-09-04 NOTE — PLAN OF CARE
Problem: Patient Care Overview  Goal: Plan of Care Review  Outcome: Ongoing (interventions implemented as appropriate)   09/04/19 6050   Plan of Care Review   Progress improving   OTHER   Outcome Summary Pt was able to follow seated exercise instructions with little cues.    Coping/Psychosocial   Plan of Care Reviewed With patient   Coping/Psychosocial   Patient Agreement with Plan of Care agrees

## 2019-09-04 NOTE — PROGRESS NOTES
The patient seems brighter today.  She has been pleasant and cooperative with care.  There have been absolutely no episodes of aggression towards staff.  We await word regarding disposition.

## 2019-09-05 LAB
ANION GAP SERPL CALCULATED.3IONS-SCNC: 9.9 MMOL/L (ref 5–15)
BUN BLD-MCNC: 25 MG/DL (ref 8–23)
BUN/CREAT SERPL: 29.8 (ref 7–25)
CALCIUM SPEC-SCNC: 9.5 MG/DL (ref 8.6–10.5)
CHLORIDE SERPL-SCNC: 101 MMOL/L (ref 98–107)
CO2 SERPL-SCNC: 23.1 MMOL/L (ref 22–29)
CREAT BLD-MCNC: 0.84 MG/DL (ref 0.57–1)
GFR SERPL CREATININE-BSD FRML MDRD: 66 ML/MIN/1.73
GLUCOSE BLD-MCNC: 212 MG/DL (ref 65–99)
GLUCOSE BLDC GLUCOMTR-MCNC: 217 MG/DL (ref 70–130)
GLUCOSE BLDC GLUCOMTR-MCNC: 73 MG/DL (ref 70–130)
POTASSIUM BLD-SCNC: 4.5 MMOL/L (ref 3.5–5.2)
SODIUM BLD-SCNC: 134 MMOL/L (ref 136–145)

## 2019-09-05 PROCEDURE — 80048 BASIC METABOLIC PNL TOTAL CA: CPT | Performed by: NURSE PRACTITIONER

## 2019-09-05 PROCEDURE — 82962 GLUCOSE BLOOD TEST: CPT

## 2019-09-05 PROCEDURE — 63710000001 INSULIN LISPRO (HUMAN) PER 5 UNITS: Performed by: PHYSICAL MEDICINE & REHABILITATION

## 2019-09-05 PROCEDURE — 25010000002 LORAZEPAM PER 2 MG: Performed by: SPECIALIST

## 2019-09-05 RX ORDER — LORAZEPAM 2 MG/ML
1 INJECTION INTRAMUSCULAR ONCE
Status: COMPLETED | OUTPATIENT
Start: 2019-09-05 | End: 2019-09-05

## 2019-09-05 RX ORDER — OLANZAPINE 10 MG/1
10 INJECTION, POWDER, LYOPHILIZED, FOR SOLUTION INTRAMUSCULAR EVERY 8 HOURS PRN
Status: DISCONTINUED | OUTPATIENT
Start: 2019-09-05 | End: 2019-09-13 | Stop reason: HOSPADM

## 2019-09-05 RX ORDER — OLANZAPINE 2.5 MG/1
2.5 TABLET ORAL 2 TIMES DAILY
Status: DISCONTINUED | OUTPATIENT
Start: 2019-09-05 | End: 2019-09-13 | Stop reason: HOSPADM

## 2019-09-05 RX ORDER — LORAZEPAM 2 MG/ML
1 INJECTION INTRAMUSCULAR ONCE AS NEEDED
Status: DISCONTINUED | OUTPATIENT
Start: 2019-09-05 | End: 2019-09-13 | Stop reason: HOSPADM

## 2019-09-05 RX ADMIN — GABAPENTIN 100 MG: 100 CAPSULE ORAL at 16:36

## 2019-09-05 RX ADMIN — POTASSIUM CHLORIDE 20 MEQ: 1.5 POWDER, FOR SOLUTION ORAL at 16:53

## 2019-09-05 RX ADMIN — ERGOCALCIFEROL 50000 UNITS: 1.25 CAPSULE ORAL at 16:37

## 2019-09-05 RX ADMIN — APIXABAN 5 MG: 5 TABLET, FILM COATED ORAL at 16:38

## 2019-09-05 RX ADMIN — LOSARTAN POTASSIUM 100 MG: 100 TABLET, FILM COATED ORAL at 16:53

## 2019-09-05 RX ADMIN — GLIPIZIDE 5 MG: 5 TABLET ORAL at 17:34

## 2019-09-05 RX ADMIN — ASPIRIN 325 MG: 325 TABLET ORAL at 16:36

## 2019-09-05 RX ADMIN — LORAZEPAM 1 MG: 2 INJECTION INTRAMUSCULAR; INTRAVENOUS at 13:36

## 2019-09-05 RX ADMIN — DORZOLAMIDE HYDROCHLORIDE 1 DROP: 20 SOLUTION/ DROPS OPHTHALMIC at 22:37

## 2019-09-05 RX ADMIN — OLANZAPINE 5 MG: 10 INJECTION, POWDER, FOR SOLUTION INTRAMUSCULAR at 10:41

## 2019-09-05 RX ADMIN — NEBIVOLOL HYDROCHLORIDE 20 MG: 10 TABLET ORAL at 16:34

## 2019-09-05 RX ADMIN — HYDRALAZINE HYDROCHLORIDE 25 MG: 25 TABLET ORAL at 16:37

## 2019-09-05 RX ADMIN — CLOPIDOGREL 75 MG: 75 TABLET, FILM COATED ORAL at 16:35

## 2019-09-05 RX ADMIN — DORZOLAMIDE HYDROCHLORIDE 1 DROP: 20 SOLUTION/ DROPS OPHTHALMIC at 16:38

## 2019-09-05 RX ADMIN — METFORMIN HYDROCHLORIDE 1000 MG: 1000 TABLET ORAL at 17:34

## 2019-09-05 RX ADMIN — PANTOPRAZOLE SODIUM 40 MG: 40 TABLET, DELAYED RELEASE ORAL at 17:34

## 2019-09-05 RX ADMIN — OLANZAPINE 5 MG: 10 INJECTION, POWDER, FOR SOLUTION INTRAMUSCULAR at 08:46

## 2019-09-05 RX ADMIN — PAROXETINE HYDROCHLORIDE 10 MG: 10 TABLET, FILM COATED ORAL at 16:37

## 2019-09-05 RX ADMIN — INSULIN LISPRO 3 UNITS: 100 INJECTION, SOLUTION INTRAVENOUS; SUBCUTANEOUS at 17:34

## 2019-09-05 RX ADMIN — DEXTROMETHORPHAN HYDROBROMIDE AND QUINIDINE SULFATE 1 CAPSULE: 20; 10 CAPSULE, GELATIN COATED ORAL at 16:35

## 2019-09-05 RX ADMIN — BRIMONIDINE TARTRATE 1 DROP: 2 SOLUTION OPHTHALMIC at 16:39

## 2019-09-05 NOTE — PROGRESS NOTES
Patient's behavior has deteriorated considerably this morning.  She is defecated on herself and been resistant to care combative with staff etc.  I will begin scheduled Zyprexa twice daily and will increase the patient's PRN Zyprexa dose.  During today's interview this physician did attempt to see if the patient is able to comprehend and follow commands.  She is unable to do so.

## 2019-09-05 NOTE — PROGRESS NOTES
Continued Stay Note  [facility]     Patient Name: Darby Workman  MRN: [id number]  Today's Date: 9/5/2019    Admit Date: 8/29/2019    Discharge Plan     Row Name 09/05/19 1536       Plan    Plan Comments  SW rec'd a call from Andrew EMERSON, liaison for [facility] who stated that they are no longer able to accept pt at their facility.        Discharge Codes    No documentation.             SAMIR Buchanan

## 2019-09-05 NOTE — PROGRESS NOTES
Progress Note    Name: Darby Workman ADMIT: 2019   : 1945  PCP: Eric Matta MD    MRN: [id number] LOS: 7 days   AGE/SEX: 74 y.o. female  ROOM: [facility]   Date of Encounter Visit: 19    Subjective:     Interval History: Bp improving. More combative and confused today and tends to have good days and bad days.     REVIEW OF SYSTEMS:   Unable to obtain due to confusion.     Objective:   Temp:  [97.2 °F (36.2 °C)-98.1 °F (36.7 °C)] 98.1 °F (36.7 °C)  Heart Rate:  [73-77] 76  Resp:  [16-18] 16  BP: ()/(56-68) 125/68   SpO2:  [96 %] 96 %  on    Device (Oxygen Therapy): room air    Intake/Output Summary (Last 24 hours) at 2019 1435  Last data filed at 2019 1834  Gross per 24 hour   Intake 360 ml   Output --   Net 360 ml     Body mass index is 22.01 kg/m².      19  1100   Weight: 60 kg (132 lb 4.4 oz)     Weight change:     Physical Exam   Constitutional: No distress.   HENT:   Head: Atraumatic.   Nose: Nose normal.   Eyes: Conjunctivae are normal.   Cardiovascular: Normal rate and regular rhythm.   No murmur heard.  Pulmonary/Chest: Effort normal. She has no wheezes. She has no rales.   Abdominal: Soft. Bowel sounds are normal. There is no tenderness.   Musculoskeletal: She exhibits edema (trace ankle).   Neurological: She is alert.   Confused, fidgety and aggressive   Skin: Skin is warm and dry. She is not diaphoretic.     Results Review:      Results from last 7 days   Lab Units 19  1450 19  0644   SODIUM mmol/L 137 139   POTASSIUM mmol/L 4.4 3.6   CHLORIDE mmol/L 102 105   CO2 mmol/L 19.6* 25.5   BUN mg/dL 29* 21   CREATININE mg/dL 1.29* 0.66   GLUCOSE mg/dL 103* 111*   CALCIUM mg/dL 9.0 8.9     Estimated Creatinine Clearance: 35.7 mL/min (A) (by C-G formula based on SCr of 1.29 mg/dL (H)).      Results from last 7 days   Lab Units 19  1649 19  1205 19  0743 19  1939 19  1650 19  1146 19  0729 19  2020   GLUCOSE mg/dL  89 136* 112 148* 83 157* 104 160*                       Invalid input(s):  PHOS        Invalid input(s): LDLCALC  Results from last 7 days   Lab Units 09/02/19  0644   WBC 10*3/mm3 5.16   HEMOGLOBIN g/dL 9.8*   HEMATOCRIT % 31.4*   PLATELETS 10*3/mm3 246   MCV fL 92.4   MCH pg 28.8   MCHC g/dL 31.2*   RDW % 14.6   RDW-SD fl 49.2   MPV fL 10.6   NEUTROPHIL % % 70.2   LYMPHOCYTE % % 17.4*   MONOCYTES % % 7.9   EOSINOPHIL % % 3.3   BASOPHIL % % 0.8   IMM GRAN % % 0.4   NEUTROS ABS 10*3/mm3 3.62   LYMPHS ABS 10*3/mm3 0.90   MONOS ABS 10*3/mm3 0.41   EOS ABS 10*3/mm3 0.17   BASOS ABS 10*3/mm3 0.04   IMMATURE GRANS (ABS) 10*3/mm3 0.02   NRBC /100 WBC 0.0                                           Imaging:  Imaging Results (last 24 hours)     ** No results found for the last 24 hours. **          I reviewed the patient's new clinical results and medications.         Medication Review:   Scheduled Meds:    apixaban 5 mg Oral Q12H   aspirin 325 mg Oral Daily   atorvastatin 80 mg Oral Nightly   brimonidine 1 drop Both Eyes BID   clopidogrel 75 mg Oral Daily   dextromethorphan-quinidine 1 capsule Oral Daily   dorzolamide 1 drop Both Eyes BID   gabapentin 100 mg Oral BID   glipiZIDE 5 mg Oral BID AC   hydrALAZINE 25 mg Oral Q8H   insulin lispro 0-7 Units Subcutaneous 4x Daily With Meals & Nightly   losartan 100 mg Oral Q24H   melatonin 3 mg Oral Nightly   metFORMIN 1,000 mg Oral BID With Meals   nebivolol 20 mg Oral Daily   OLANZapine 2.5 mg Oral BID   pantoprazole 40 mg Oral BID AC   PARoxetine 10 mg Oral Daily   potassium chloride 20 mEq Oral Daily With Breakfast   vitamin D 50,000 Units Oral Q7 Days     Continuous Infusions:   PRN Meds:.•  acetaminophen  •  aluminum-magnesium hydroxide-simethicone  •  dextrose  •  dextrose  •  glucagon (human recombinant)  •  LORazepam  •  nitroglycerin  •  OLANZapine    Problem List:     Active Hospital Problems    Diagnosis  POA   • KIERAN (acute kidney injury) (CMS/HCC) [N17.9]  Unknown   •  Hypotension [I95.9]  Unknown   • Dysthymic disorder [F34.1]  Yes      Resolved Hospital Problems    Diagnosis Date Resolved POA   • Cerebral infarction due to stenosis of left carotid artery (CMS/[facility]) [I63.232] 09/03/2019 Yes       Assessment and Plan:     ·   Dysthymic disorder- per psychiatry.  on 9/4/19. Increased agitation and combativeness today requiring sedatives. Management per psychiatry team.   ·   KIERAN (acute kidney injury) (CMS/[facility])-Baseline creatinine 0.6 and went up to 1.24.  Likely from hypotension and multiple medications.  BP meds adjusted and given 1 liter of fluids. Refused labs today. Will try to repeat in am.   ·   Hypotension with history of hypertension. Norvasc and HCTZ stopped. Losartan switched to pm dose. Holding parameters and will monitor.     I discussed the patients findings and my recommendations with patient and nursing staff. Will follow up on labs and BP tomorrow.     GETACHEW Bronson  09/05/19  2:35 PM

## 2019-09-05 NOTE — PLAN OF CARE
Problem: Patient Care Overview  Goal: Plan of Care Review  Outcome: Ongoing (interventions implemented as appropriate)     09/05/19 3977   OTHER   Outcome Summary Discussed patient with RN, who reported that the patient has been irritable and uncoperative today. RN reports she is tolerating her current diet without s/s pen/asp. Patient not appropriate for assessment this date d/t uncooperative behaviors. ST to follow for diet tolerance and speech-language evaluation as appropriate.

## 2019-09-05 NOTE — PLAN OF CARE
"Problem: Patient Care Overview  Goal: Plan of Care Review  Outcome: Ongoing (interventions implemented as appropriate)   09/05/19 0400 09/05/19 0522   Plan of Care Review   Progress --  no change   OTHER   Outcome Summary --  Pt has been irritable, frustrated and uncooperative this shift. Pt has refused all her meds, vitals, blood glucose monitoring  and will not allow us to clean her up. Pt appears to be speaking in Greek at times but other times clearly states \"Get out\" or \"No, No, No\". Pt has been visibly irritated and frustrated all evening. Pt only received 300 ml of her IVF on day shift due to mechanical issues and refused the remaining 700 ml. Pt has been uncooperative and angry. Pt had broken sleep and slept for 5-6 hours. 9/5/19 0529     Coping/Psychosocial   Plan of Care Reviewed With patient --        Problem: Overarching Goals (Adult)  Goal: Adheres to Safety Considerations for Self and Others  Outcome: Ongoing (interventions implemented as appropriate)   09/05/19 0522   Overarching Goals (Adult)   Adheres to Safety Considerations for Self and Others making progress toward outcome     Goal: Optimized Coping Skills in Response to Life Stressors  Outcome: Ongoing (interventions implemented as appropriate)   09/05/19 0522   Overarching Goals (Adult)   Optimized Coping Skills in Response to Life Stressors making progress toward outcome     Goal: Develops/Participates in Therapeutic [place] to Support Successful Transition  Outcome: Ongoing (interventions implemented as appropriate)   09/05/19 0522   Overarching Goals (Adult)   Develops/Participates in Therapeutic [place] to Support Successful Transition making progress toward outcome         "

## 2019-09-06 PROBLEM — N17.9 AKI (ACUTE KIDNEY INJURY) (HCC): Status: RESOLVED | Noted: 2019-09-04 | Resolved: 2019-09-06

## 2019-09-06 LAB
ANION GAP SERPL CALCULATED.3IONS-SCNC: 10.3 MMOL/L (ref 5–15)
BUN BLD-MCNC: 20 MG/DL (ref 8–23)
BUN/CREAT SERPL: 32.3 (ref 7–25)
CALCIUM SPEC-SCNC: 9.1 MG/DL (ref 8.6–10.5)
CHLORIDE SERPL-SCNC: 105 MMOL/L (ref 98–107)
CO2 SERPL-SCNC: 25.7 MMOL/L (ref 22–29)
CREAT BLD-MCNC: 0.62 MG/DL (ref 0.57–1)
GFR SERPL CREATININE-BSD FRML MDRD: 94 ML/MIN/1.73
GLUCOSE BLD-MCNC: 116 MG/DL (ref 65–99)
GLUCOSE BLDC GLUCOMTR-MCNC: 114 MG/DL (ref 70–130)
GLUCOSE BLDC GLUCOMTR-MCNC: 121 MG/DL (ref 70–130)
GLUCOSE BLDC GLUCOMTR-MCNC: 150 MG/DL (ref 70–130)
GLUCOSE BLDC GLUCOMTR-MCNC: 48 MG/DL (ref 70–130)
GLUCOSE BLDC GLUCOMTR-MCNC: 68 MG/DL (ref 70–130)
GLUCOSE BLDC GLUCOMTR-MCNC: 80 MG/DL (ref 70–130)
POTASSIUM BLD-SCNC: 4.2 MMOL/L (ref 3.5–5.2)
SODIUM BLD-SCNC: 141 MMOL/L (ref 136–145)

## 2019-09-06 PROCEDURE — 80048 BASIC METABOLIC PNL TOTAL CA: CPT | Performed by: NURSE PRACTITIONER

## 2019-09-06 PROCEDURE — 97110 THERAPEUTIC EXERCISES: CPT

## 2019-09-06 PROCEDURE — 63710000001 INSULIN LISPRO (HUMAN) PER 5 UNITS: Performed by: PHYSICAL MEDICINE & REHABILITATION

## 2019-09-06 PROCEDURE — 97535 SELF CARE MNGMENT TRAINING: CPT | Performed by: OCCUPATIONAL THERAPIST

## 2019-09-06 PROCEDURE — 82962 GLUCOSE BLOOD TEST: CPT

## 2019-09-06 RX ORDER — AMLODIPINE BESYLATE 5 MG/1
5 TABLET ORAL
Status: DISCONTINUED | OUTPATIENT
Start: 2019-09-07 | End: 2019-09-13 | Stop reason: HOSPADM

## 2019-09-06 RX ORDER — AMLODIPINE BESYLATE 5 MG/1
5 TABLET ORAL
Status: DISCONTINUED | OUTPATIENT
Start: 2019-09-06 | End: 2019-09-06

## 2019-09-06 RX ORDER — AMLODIPINE BESYLATE 5 MG/1
5 TABLET ORAL 2 TIMES DAILY
Status: DISCONTINUED | OUTPATIENT
Start: 2019-09-06 | End: 2019-09-06

## 2019-09-06 RX ORDER — LORAZEPAM 0.5 MG/1
0.5 TABLET ORAL 2 TIMES DAILY
Status: DISCONTINUED | OUTPATIENT
Start: 2019-09-06 | End: 2019-09-13 | Stop reason: HOSPADM

## 2019-09-06 RX ADMIN — PANTOPRAZOLE SODIUM 40 MG: 40 TABLET, DELAYED RELEASE ORAL at 17:54

## 2019-09-06 RX ADMIN — DORZOLAMIDE HYDROCHLORIDE 1 DROP: 20 SOLUTION/ DROPS OPHTHALMIC at 10:30

## 2019-09-06 RX ADMIN — GABAPENTIN 100 MG: 100 CAPSULE ORAL at 10:20

## 2019-09-06 RX ADMIN — PAROXETINE HYDROCHLORIDE 10 MG: 10 TABLET, FILM COATED ORAL at 10:16

## 2019-09-06 RX ADMIN — HYDRALAZINE HYDROCHLORIDE 25 MG: 25 TABLET ORAL at 13:53

## 2019-09-06 RX ADMIN — ATORVASTATIN CALCIUM 80 MG: 80 TABLET, FILM COATED ORAL at 20:07

## 2019-09-06 RX ADMIN — INSULIN LISPRO 2 UNITS: 100 INJECTION, SOLUTION INTRAVENOUS; SUBCUTANEOUS at 12:36

## 2019-09-06 RX ADMIN — AMLODIPINE BESYLATE 5 MG: 5 TABLET ORAL at 11:23

## 2019-09-06 RX ADMIN — DEXTROMETHORPHAN HYDROBROMIDE AND QUINIDINE SULFATE 1 CAPSULE: 20; 10 CAPSULE, GELATIN COATED ORAL at 10:14

## 2019-09-06 RX ADMIN — BRIMONIDINE TARTRATE 1 DROP: 2 SOLUTION OPHTHALMIC at 10:31

## 2019-09-06 RX ADMIN — Medication 3 MG: at 20:07

## 2019-09-06 RX ADMIN — BRIMONIDINE TARTRATE 1 DROP: 2 SOLUTION OPHTHALMIC at 20:07

## 2019-09-06 RX ADMIN — GLIPIZIDE 5 MG: 5 TABLET ORAL at 10:20

## 2019-09-06 RX ADMIN — LORAZEPAM 0.5 MG: 0.5 TABLET ORAL at 11:23

## 2019-09-06 RX ADMIN — METFORMIN HYDROCHLORIDE 1000 MG: 1000 TABLET ORAL at 17:54

## 2019-09-06 RX ADMIN — OLANZAPINE 2.5 MG: 2.5 TABLET, FILM COATED ORAL at 10:18

## 2019-09-06 RX ADMIN — METFORMIN HYDROCHLORIDE 1000 MG: 1000 TABLET ORAL at 10:19

## 2019-09-06 RX ADMIN — CLOPIDOGREL 75 MG: 75 TABLET, FILM COATED ORAL at 10:17

## 2019-09-06 RX ADMIN — LORAZEPAM 0.5 MG: 0.5 TABLET ORAL at 20:07

## 2019-09-06 RX ADMIN — DEXTROMETHORPHAN HYDROBROMIDE AND QUINIDINE SULFATE 1 CAPSULE: 20; 10 CAPSULE, GELATIN COATED ORAL at 20:07

## 2019-09-06 RX ADMIN — OLANZAPINE 2.5 MG: 2.5 TABLET, FILM COATED ORAL at 20:07

## 2019-09-06 RX ADMIN — DORZOLAMIDE HYDROCHLORIDE 1 DROP: 20 SOLUTION/ DROPS OPHTHALMIC at 20:07

## 2019-09-06 RX ADMIN — PANTOPRAZOLE SODIUM 40 MG: 40 TABLET, DELAYED RELEASE ORAL at 10:18

## 2019-09-06 RX ADMIN — APIXABAN 5 MG: 5 TABLET, FILM COATED ORAL at 10:16

## 2019-09-06 RX ADMIN — ASPIRIN 325 MG: 325 TABLET ORAL at 10:18

## 2019-09-06 RX ADMIN — POTASSIUM CHLORIDE 20 MEQ: 1.5 POWDER, FOR SOLUTION ORAL at 10:21

## 2019-09-06 RX ADMIN — GABAPENTIN 100 MG: 100 CAPSULE ORAL at 20:07

## 2019-09-06 RX ADMIN — HYDRALAZINE HYDROCHLORIDE 25 MG: 25 TABLET ORAL at 10:19

## 2019-09-06 RX ADMIN — NEBIVOLOL HYDROCHLORIDE 20 MG: 10 TABLET ORAL at 11:23

## 2019-09-06 RX ADMIN — APIXABAN 5 MG: 5 TABLET, FILM COATED ORAL at 20:07

## 2019-09-06 NOTE — PROGRESS NOTES
After failing to respond to 2 doses of intramuscular Zyprexa yesterday, the patient was given a dose of intramuscular Ativan.  She seemed to have a very positive response this medication, brightening considerably and becoming more compliant with treatment.  Accordingly, I will add scheduled Ativan 0.5 mg twice daily to the patient's current medication regimen.  Additionally, we will increase the patient's Nuedexta to twice daily dosing as scheduled today.

## 2019-09-06 NOTE — THERAPY TREATMENT NOTE
Acute Care - Occupational Therapy Treatment Note  [facility]     Patient Name: Darby Workman  : 1945  MRN: [id number]  Today's Date: 2019             Admit Date: 2019     No diagnosis found.  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)   • Right-sided headache   • Acute ischemic stroke (CMS/HCC)   • Embolic stroke (CMS/HCC)   • Anticoagulated by anticoagulation treatment   • Stroke (cerebrum) (CMS/HCC)   • Dysthymic disorder   • KIERAN (acute kidney injury) (CMS/HCC)   • Hypotension     Past Medical History:   Diagnosis Date   • Acute cerebrovascular accident (CVA) of cerebellum (CMS/HCC)    • Acute ischemic stroke (CMS/HCC)    • Arthritis    • Coronary artery disease    • CVA (cerebral vascular accident) (CMS/HCC)    • Diabetes mellitus (CMS/HCC)    • Heart attack (CMS/HCC)    • History of blood clots    • Hyperlipidemia    • Hypertension    • Vision loss      Past Surgical History:   Procedure Laterality Date   • CAROTID ENDARTERECTOMY Left 2019    Procedure: Left carotid endarterectomy;  Surgeon: Rupert Oliva MD;  Location: [facility];  Service: Neurosurgery   • EMBOLECTOMY Left 2019    Procedure: CEREBRAL ANGIOGRAM, LEFT INTERNAL CAROTID ARTERY STENT;  Surgeon: Rupert Oliva MD;  Location: [facility] ;  Service: Neurosurgery       Therapy Treatment    Rehabilitation Treatment Summary     Row Name 19 1340 19 0953          Treatment Time/Intention    Discipline  occupational therapist  -SG  physical therapist  -MD     Document Type  therapy note (daily note)  -SG  therapy note (daily note)  -MD     Subjective Information  no complaints  -SG  no complaints  -MD     Mode of Treatment  individual therapy;occupational therapy  -SG  physical therapy  -MD     Comment  Pt having a  good day, agreeable to therapy  -SG  --     Existing Precautions/Restrictions  fall  -SG  fall  -MD     Recorded by [SG] Evie Fernando, OTR 09/06/19 1343 [MD] Mira Chadwick, PT 09/06/19 0955     Row Name 09/06/19 1340 09/06/19 0953          Cognitive Assessment/Intervention- PT/OT    Affect/Mental Status (Cognitive)  confused  -SG  --     Orientation Status (Cognition)  oriented to;person  -SG  oriented to;person  -MD     Follows Commands (Cognition)  follows one step commands;50-74% accuracy;repetition of directions required;verbal cues/prompting required  -SG  follows one step commands;50-74% accuracy;repetition of directions required;verbal cues/prompting required  -MD     Cognitive Function (Cognitive)  attention deficit;safety deficit;memory deficit  -SG  --     Personal Safety Interventions  --  fall prevention program maintained;gait belt  -MD     Recorded by [SG] Evie Fernando, OTR 09/06/19 1343 [MD] Mira Chadwick, PT 09/06/19 0955     Row Name 09/06/19 1340             Transfer Assessment/Treatment    Transfer Assessment/Treatment  sit-stand transfer;stand-sit transfer  -SG      Recorded by [SG] Evie Fernando, OTR 09/06/19 1343      Row Name 09/06/19 1340 09/06/19 0953          Sit-Stand Transfer    Sit-Stand Hurt (Transfers)  supervision  -SG  supervision  -MD     Recorded by [SG] Evie Fernando, OTR 09/06/19 1343 [MD] Mira Chadwick, PT 09/06/19 0955     Row Name 09/06/19 1340 09/06/19 0953          Stand-Sit Transfer    Stand-Sit Hurt (Transfers)  supervision  -SG  supervision  -MD     Recorded by [SG] Evie Fernando, OTR 09/06/19 1343 [MD] Mira Chadwick, PT 09/06/19 0955     Row Name 09/06/19 0953             Gait/Stairs Assessment/Training    Hurt Level (Gait)  supervision  -MD      Distance in Feet (Gait)  100  -MD      Pattern (Gait)  step-through  -MD      Deviations/Abnormal Patterns (Gait)  festinating/shuffling;gait speed decreased  -MD      Recorded by [MD] Mira Chadwick, PT  09/06/19 0955      Row Name 09/06/19 1340             ADL Assessment/Intervention    BADL Assessment/Intervention  grooming  -SG      Recorded by [SG] Evie Fernando OTR 09/06/19 1343      Row Name 09/06/19 1340             Grooming Assessment/Training    [place] Level (Grooming)  grooming skills;hair care, combing/brushing;oral care regimen;wash face, hands;minimum assist (75% patient effort);verbal cues;nonverbal cues (demo/gesture)  -SG      Assistive Devices (Grooming)  hand over hand  -SG      Grooming Position  sink side;supported standing  -SG      Recorded by [SG] Evie Fernando OTR 09/06/19 1343      Row Name 09/06/19 1340 09/06/19 0953          Positioning and Restraints    Pre-Treatment Position  sitting in chair/recliner  -SG  sitting in chair/recliner  -MD     Post Treatment Position  chair  -SG  chair  -MD     In Chair  sitting;patient within staff view  -SG  sitting;patient within staff view  -MD     Recorded by [SG] Evie Fernando OTR 09/06/19 1343 [MD] Mira Chadwick, PT 09/06/19 0955     Row Name 09/06/19 1340 09/06/19 0953          Pain Scale: Numbers Pre/Post-Treatment    Pain Scale: Numbers, Pretreatment  0/10 - no pain  -SG  0/10 - no pain  -MD     Recorded by [SG] Evie Fernando OTR 09/06/19 1343 [MD] Mira Chadwick, PT 09/06/19 0955     Row Name                Wound 07/17/19 1015 Left neck incision    Wound - Properties Group Date first assessed: 07/17/19 [LD] Time first assessed: 1015 [LD] Side: Left [LD] Location: neck [LD] Type: incision [LD] Recorded by:  [LD] Martha Foster RN 07/17/19 1015      User Key  (r) = Recorded By, (t) = Taken By, (c) = Cosigned By    Initials Name Effective Dates Discipline     Evie Fernando OTR 12/26/18 -  OT    Mira Vaca, PT 04/03/18 -  PT    Martha Samuel RN 06/16/16 -  Nurse             Occupational Therapy Education     Title: PT OT SLP Therapies (Not Started)     Topic: Occupational Therapy (In Progress)     Point: ADL training  (In Progress)     Description: Instruct learner(s) on proper safety adaptation and remediation techniques during self care or transfers.   Instruct in proper use of assistive devices.    Learning Progress Summary           Patient DENNY Angeles, NL by DOROTHY at 8/30/2019 11:56 AM    Comment:  OT educ on OT role in therapeutic process and pt's POC.                               User Key     Initials Effective Dates Name Provider Type Discipline    RP 05/03/18 -  Gabriela Brown, OT Occupational Therapist OT                OT Recommendation and Plan     Plan of Care Review  Plan of Care Reviewed With: patient  Plan of Care Reviewed With: patient  Outcome Summary: Pt actually agreeable to simple ADL this am, able to stand at sink to brush teeth/hair, and wash hands. Pt does need max cues hand [place] and demo apraxia but much less irritable and resistive.  Outcome Measures     Row Name 09/06/19 1300 09/06/19 0900 09/04/19 0935       How much help from another person do you currently need...    Turning from your back to your side while in flat bed without using bedrails?  --  4  -MD  4  -KELBY    Moving from lying on back to sitting on the side of a flat bed without bedrails?  --  4  -MD  4  -KELBY    Moving to and from a bed to a chair (including a wheelchair)?  --  3  -MD  3  -KELBY    Standing up from a chair using your arms (e.g., wheelchair, bedside chair)?  --  3  -MD  3  -KELBY    Climbing 3-5 steps with a railing?  --  3  -MD  3  -KELBY    To walk in hospital room?  --  3  -MD  3  -KELBY    AM-PAC 6 Clicks Score (PT)  --  20  -MD  20  -KELBY       How much help from another is currently needed...    Putting on and taking off regular lower body clothing?  2  -SG  --  --    Bathing (including washing, rinsing, and drying)  2  -SG  --  --    Toileting (which includes using toilet bed pan or urinal)  2  -SG  --  --    Putting on and taking off regular upper body clothing  3  -SG  --  --    Taking care of personal grooming (such as brushing teeth)  3   -  --  --    Eating meals  3  -SG  --  --    AM-PAC 6 Clicks Score (OT)  15  -SG  --  --       Functional Assessment    Outcome Measure Options  AM-PAC 6 Clicks Daily Activity (OT)  -EDWINA  AM-PAC 6 Clicks Basic Mobility (PT)  -MD  AM-PAC 6 Clicks Basic Mobility (PT)  -KELBY      User Key  (r) = Recorded By, (t) = Taken By, (c) = Cosigned By    Initials Name Provider Type    Evie Bae OTR Occupational Therapist    Poppy Trevino, PT Physical Therapist    Mira Vaca, PT Physical Therapist           Time Calculation:   Time Calculation- OT     Row Name 09/06/19 1344             Time Calculation- OT    OT Start Time  0944  -      OT Stop Time  0958  -      OT Time Calculation (min)  14 min  -      OT Received On  09/06/19  -        User Key  (r) = Recorded By, (t) = Taken By, (c) = Cosigned By    Initials Name Provider Type    Evie Bae OTR Occupational Therapist        Therapy Charges for Today     Code Description Service Date Service Provider Modifiers Qty    [id number]  OT SELF CARE/MGMT/TRAIN EA 15 MIN 9/6/2019 Evie Fernando OTR GO 1               JOHN Walton  9/6/2019

## 2019-09-06 NOTE — PLAN OF CARE
Problem: Patient Care Overview  Goal: Plan of Care Review  Outcome: Ongoing (interventions implemented as appropriate)   09/05/19 2028   Plan of Care Review   Progress no change   OTHER   Outcome Summary    Coping/Psychosocial   Plan of Care Reviewed With patient   Coping/Psychosocial   Patient Agreement with Plan of Care refuses to participate   Pt was restless, combative, and confused at the begining of the shift. [place] restraint,  was applyied at 1045. Pt  Was so restless that she removed the restraint 2 times, wrist restrain order received, but were not used. Zyprexa 5 mg IM was administered respectively at 0846 and 1041 per Dr Magdaleno order. Pt continue to be restless, Ativan 1 mg Im was administered at 1244. Pt calm down, took her evening medications. Pt became mor cooperative with care. Will continue to monitor behavior, provide support and a safe environment.  Goal: Interprofessional Rounds/Family Conf  Outcome: Ongoing (interventions implemented as appropriate)      Problem: Overarching Goals (Adult)  Goal: Adheres to Safety Considerations for Self and Others   09/05/19 2028   Overarching Goals (Adult)   Adheres to Safety Considerations for Self and Others making progress toward outcome     Intervention: Develop and Maintain Individualized Safety Plan   08/30/19 1400 09/05/19 1200 09/05/19 1800   Violence Risk   Feels Like Hurting Others --  no --    Previous Attempt to Harm Others --  no --    Precipitating Factors (Confusion) --  --    C-SSRS (Screen-Recent) Past Month   Q1 Wished to be Dead (Past Month) --  no --    Q2 Suicidal Thoughts (Past Month) --  no --    Q6 Suicide Behavior (Lifetime) --  no --    Level of Risk per Screen --  screen negative --    Develop and Maintain Individualized Safety Plan   Safety Measures --  --  safety rounds completed       Goal: Optimized Coping Skills in Response to Life Stressors  Outcome: Ongoing (interventions implemented as appropriate)   09/05/19 2028    Overarching Goals (Adult)   Optimized Coping Skills in Response to Life Stressors making progress toward outcome     Intervention: Promote Effective Coping Strategies   09/05/19 2028   Coping/Psychosocial Interventions   Supportive Measures active listening utilized;relaxation techniques promoted       Goal: Develops/Participates in Therapeutic [place] to Support Successful Transition  Outcome: Ongoing (interventions implemented as appropriate)   09/05/19 2028   Overarching Goals (Adult)   Develops/Participates in Therapeutic [place] to Support Successful Transition making progress toward outcome     Intervention: Foster Therapeutic [place]   09/05/19 2028   Interventions   Trust Relationship/Rapport care explained;emotional support provided;empathic listening provided     Intervention: Mutually Develop Transition Plan   09/05/19 2028   Mutually Develop Transition Plan   Transition Support crisis management plan promoted         Problem: Decreased Participation/Engagement (Depressive Signs/Symptoms) (Adult)  Goal: Increased Participation/Engagement (Depressive Signs/Symptoms)  Outcome: Ongoing (interventions implemented as appropriate)      Problem: Social/Occupational/Functional Impairment (Depressive Signs/Symptoms) (Adult)  Goal: Improved Social/Occupational/Functional Skills (Depressive Signs/Symptoms)  Outcome: Ongoing (interventions implemented as appropriate)   09/03/19 1051 09/05/19 2028   Improved Social/Occupational/Functional Skills (Depressive Signs/Symptoms)   Improved Social/Occupational/Functional Skills Action Step/Short Term Goal (STG) Established 09/03/19 --    Improved Social/Occupational/Functional Skills Time Frame for Action Step (STG) --  2 days   Improved Social/Occupational/Functional Skills Action Step (STG) Outcome --  making progress toward outcome       Problem: Psychomotor Impairment (Depressive Signs/Symptoms) (Adult)  Goal: Improved Psychomotor Symptoms (Depressive  Signs/Symptoms)  Outcome: Ongoing (interventions implemented as appropriate)   09/03/19 1051 09/05/19 2028   Improved Psychomotor Symptoms (Depressive Signs/Symptoms)   Improved Psychomotor Symptoms Action Step/Short Term Goal (STG) Established 09/03/19 --    Improved Psychomotor Symptoms Time Frame for Action Step (STG) --  2 days       Problem: Cognitive Impairment (Dementia Signs/Symptoms) (Adult)  Goal: Optimized Cognitive Function (Dementia Signs/Symptoms)  Outcome: Ongoing (interventions implemented as appropriate)   09/03/19 1051 09/05/19 2028   Optimized Cognitive Function (Dementia Signs/Symptoms)   Optimized Cognitive Ability Action Step/Short Term Goal (STG) Established 09/03/19 --    Optimized Cognitive Ability Time Frame for Action Step (STG) --  2 days       Problem: Behavioral Impairment (Dementia Signs/Symptoms) (Adult)  Goal: Improved Behavioral Control (Dementia Signs/Symptoms)  Outcome: Ongoing (interventions implemented as appropriate)   09/03/19 1051 09/05/19 2028   Improved Behavioral Control (Dementia Signs/Symptoms)   Improved Behavior Control Action Step/Short Term Goal (STG) Established 09/03/19 --    Improved Behavioral Control Time Frame for Action Step (STG) --  2 days       Problem: Psychological Impairment (Dementia Signs/Symptoms) (Adult)  Goal: Improved Psychological Symptoms (Dementia Signs/Symptoms)  Outcome: Ongoing (interventions implemented as appropriate)   09/03/19 1051 09/05/19 2028   Improved Psychological Symptoms (Dementia Signs/Symptoms)   Improved Psychological Symptoms Action Step/Short Term Goal (STG) Established 09/03/19 --    Improved Psychologic Symptoms Time Frame for Action Step (STG) --  2 days       Problem: Skin Injury Risk (Adult)  Goal: Skin Health and Integrity  Outcome: Ongoing (interventions implemented as appropriate)   09/05/19 2028   Skin Injury Risk (Adult)   Skin Health and Integrity making progress toward outcome       Problem: Fall Risk  (Adult)  Goal: Absence of Fall  Outcome: Ongoing (interventions implemented as appropriate)   09/05/19 2028   Fall Risk (Adult)   Absence of Fall making progress toward outcome       Problem: Restraint, Nonbehavioral (Nonviolent)  Goal: Rationale and Justification  Outcome: Ongoing (interventions implemented as appropriate)   09/05/19 2028   Restraint, Nonbehavioral (Nonviolent)   Rationale and Justification prevent harm to self     Goal: Nonbehavioral (Nonviolent) Restraint: Absence of Injury/Harm  Outcome: Ongoing (interventions implemented as appropriate)   09/05/19 2028   Restraint, Nonbehavioral (Nonviolent)   Nonbehavioral (Nonviolent) Restraint: Absence of Injury/Harm not met     Goal: Nonbehavioral (Nonviolent) Restraint: Achievement of Discontinuation Criteria  Outcome: Ongoing (interventions implemented as appropriate)   09/05/19 2028   Restraint, Nonbehavioral (Nonviolent)   Nonbehavioral (Nonviolent) Restraint: Achievement of Discontinuation Criteria not met     Goal: Nonbehavioral (Nonviolent) Restraint: Preservation of Dignity and Wellbeing  Outcome: Ongoing (interventions implemented as appropriate)   09/05/19 2028   Restraint, Nonbehavioral (Nonviolent)   Nonbehavioral (Nonviolent) Restraint: Preservation of Dignity and Wellbeing not met

## 2019-09-06 NOTE — PLAN OF CARE
Problem: Patient Care Overview  Goal: Plan of Care Review   09/06/19 0955   Plan of Care Review   Progress improving   OTHER   Outcome Summary Pt progressing and very agreeable to work with this am. Pt requiring only supervision level of assistance when ambulating.   Coping/Psychosocial   Plan of Care Reviewed With patient

## 2019-09-06 NOTE — PROGRESS NOTES
Continued Stay Note  [facility]     Patient Name: Darby Workman  MRN: [id number]  Today's Date: 9/6/2019    Admit Date: 8/29/2019    Discharge Plan     Row Name 09/06/19 1451       Plan    Plan Comments  SW met w/pt's daughter, Silva on the unit.  AYSHA informed her that [facility] was no longer an option for pt due to bxs.  SW advised that once pt is stable she would send referrals out to other facilities for rehab.        Discharge Codes    No documentation.             SAMIR Buchanan

## 2019-09-06 NOTE — PLAN OF CARE
Problem: Patient Care Overview  Goal: Plan of Care Review   09/06/19 1343   Plan of Care Review   Progress improving   OTHER   Outcome Summary Pt actually agreeable to simple ADL this am, able to stand at sink to brush teeth/hair, and wash hands. Pt does need max cues hand [place] and demo apraxia but much less irritable and resistive.   Coping/Psychosocial   Plan of Care Reviewed With patient

## 2019-09-06 NOTE — PLAN OF CARE
Problem: Patient Care Overview  Goal: Plan of Care Review  Outcome: Ongoing (interventions implemented as appropriate)   09/06/19 0515 09/06/19 0709   Plan of Care Review   Progress --  improving   OTHER   Outcome Summary --  Pt was sedated but was arousable for most of the shift. Pt was too lethargic to swallow her meds. Pt woke up around 0530 and was pleasant, calm and cooperative with care. Pt posey removed when pt promised to call before getting up. Pt denies pain. No aggression seen this shift.  9/6/19 0712     Coping/Psychosocial   Plan of Care Reviewed With patient --    Coping/Psychosocial   Patient Agreement with Plan of Care unable to participate  (too sedated ) --        Problem: Overarching Goals (Adult)  Goal: Adheres to Safety Considerations for Self and Others  Outcome: Ongoing (interventions implemented as appropriate)   09/06/19 0709   Overarching Goals (Adult)   Adheres to Safety Considerations for Self and Others making progress toward outcome     Goal: Optimized Coping Skills in Response to Life Stressors  Outcome: Ongoing (interventions implemented as appropriate)   09/06/19 0709   Overarching Goals (Adult)   Optimized Coping Skills in Response to Life Stressors making progress toward outcome     Goal: Develops/Participates in Therapeutic [place] to Support Successful Transition  Outcome: Ongoing (interventions implemented as appropriate)   09/06/19 0709   Overarching Goals (Adult)   Develops/Participates in Therapeutic [place] to Support Successful Transition making progress toward outcome

## 2019-09-06 NOTE — PLAN OF CARE
Problem: Restraint, Nonbehavioral (Nonviolent)  Goal: Nonbehavioral (Nonviolent) Restraint: Preservation of Dignity and Wellbeing  Outcome: Ongoing (interventions implemented as appropriate)   09/06/19 0607   Restraint, Nonbehavioral (Nonviolent)   Nonbehavioral (Nonviolent) Restraint: Preservation of Dignity and Wellbeing met

## 2019-09-06 NOTE — PLAN OF CARE
Problem: Restraint, Nonbehavioral (Nonviolent)  Goal: Rationale and Justification  Outcome: Ongoing (interventions implemented as appropriate)   09/06/19 0607   Restraint, Nonbehavioral (Nonviolent)   Rationale and Justification prevent harm to self;failure of less restrictive safety measures     Goal: Nonbehavioral (Nonviolent) Restraint: Absence of Injury/Harm  Outcome: Ongoing (interventions implemented as appropriate)   09/06/19 0607   Restraint, Nonbehavioral (Nonviolent)   Nonbehavioral (Nonviolent) Restraint: Absence of Injury/Harm met     Goal: Nonbehavioral (Nonviolent) Restraint: Achievement of Discontinuation Criteria  Outcome: Ongoing (interventions implemented as appropriate)   09/06/19 0607   Restraint, Nonbehavioral (Nonviolent)   Nonbehavioral (Nonviolent) Restraint: Achievement of Discontinuation Criteria met

## 2019-09-06 NOTE — PROGRESS NOTES
Adult Nutrition  Assessment/PES    Patient Name:  Darby Workman  YOB: 1945  MRN: [id number]  Admit Date:  8/29/2019    Assessment Date:  9/6/2019    Comments:  LOS screen. Familiar with pt from acute rehab unit - intake varies %, which is consistent for her. Diet has upgraded to Soft/chopped meats, thin liquids. Can feed herself. Will cont to monitor.     Reason for Assessment     Row Name 09/06/19 0849          Reason for Assessment    Reason For Assessment  other (see comments) LOS     Diagnosis  neurologic conditions;diabetes diagnosis/complications;cardiac disease;psychosocial Transfer from acute rehab for dysthymic disorder         Nutrition/Diet History     Row Name 09/06/19 0851          Nutrition/Diet History    Typical Food/Fluid Intake  intake variable, can feed self     Factors Affecting Nutritional Intake  impaired cognitive status/motor control;difficulty/impaired swallowing         Anthropometrics     Row Name 09/06/19 0851          Admit Weight    Admit Weight  61.2 kg (135 lb)        Usual Body Weight (UBW)    Usual Body Weight  63.5 kg (140 lb)        Body Mass Index (BMI)    BMI Assessment  BMI 18.5-24.9: normal         Labs/Tests/Procedures/Meds     Row Name 09/06/19 0852          Labs/Procedures/Meds    Lab Results Reviewed  reviewed     Lab Results Comments  Na, Glu (), BUN        Diagnostic Tests/Procedures    Diagnostic Test/Procedure Reviewed  reviewed     Diagnostic Test/Procedures Comments  7/17/19 L internal carotid artery stent        Medications    Pertinent Medications Reviewed  reviewed     Pertinent Medications Comments  insulin, OHA, eliquis, ppi, Vit D         Physical Findings     Row Name 09/06/19 0902          Physical Findings    Skin  other (see comments) no impairment           Nutrition Prescription Ordered     Row Name 09/06/19 0902          Nutrition Prescription PO    Current PO Diet  Soft Texture     Texture  Chopped     Fluid Consistency   Thin     Supplement  Mighty Shake     Supplement Frequency  3 times a day     Common Modifiers  Consistent Carbohydrate         Evaluation of Received Nutrient/Fluid Intake     Row Name 09/06/19 0903          PO Evaluation    % PO Intake  %               Problem/Interventions:  Problem 1     Row Name 09/06/19 0903          Nutrition Diagnoses Problem 1    Problem 1  Nutrition Appropriate for Condition at this Time                 Intervention Goal     Row Name 09/06/19 0903          Intervention Goal    General  Maintain nutrition     PO  Tolerate PO;PO intake (%)     PO Intake %  75 %     Weight  No significant weight loss             Education/Evaluation     Row Name 09/06/19 0904          Monitor/Evaluation    Monitor  Per protocol           Electronically signed by:  Renate Clifford RD  09/06/19 9:04 AM

## 2019-09-06 NOTE — PROGRESS NOTES
Progress Note    Name: Darby Workman ADMIT: 2019   : 1945  PCP: Eric Matta MD    MRN: [id number] LOS: 8 days   AGE/SEX: 74 y.o. female  ROOM: [facility]   Date of Encounter Visit: 19    Subjective:     Interval History: was more appropriate yesterday afternoon. Repeat BMP showed improved creatinine. BP stable except elevated this am and repeat BP this afternoon was 113/83 per RN     REVIEW OF SYSTEMS:   Denies any fever, chills, headache, chest pain, abdominal pain or dizziness.    Objective:   Temp:  [97.7 °F (36.5 °C)-97.8 °F (36.6 °C)] 97.7 °F (36.5 °C)  Heart Rate:  [63-68] 68  Resp:  [16-18] 18  BP: (118-185)/(69-86) 185/74   SpO2:  [95 %-98 %] 98 %  on    Device (Oxygen Therapy): room air    Intake/Output Summary (Last 24 hours) at 2019 1416  Last data filed at 2019 1758  Gross per 24 hour   Intake 260 ml   Output --   Net 260 ml     Body mass index is 22.01 kg/m².      19  1100   Weight: 60 kg (132 lb 4.4 oz)     Weight change:     Physical Exam   Constitutional: No distress.   HENT:   Head: Atraumatic.   Nose: Nose normal.   Eyes: Conjunctivae are normal.   Cardiovascular: Normal rate and regular rhythm.   No murmur heard.  Pulmonary/Chest: Effort normal. She has no wheezes. She has no rales.   Abdominal: Soft. Bowel sounds are normal. There is no tenderness.   Musculoskeletal: She exhibits edema (trace ankle).   Neurological: She is alert.   Pleasantly confused. Expressive aphasia with short sentences   Skin: Skin is warm and dry. She is not diaphoretic.     Results Review:      Results from last 7 days   Lab Units 19  0820 19  1750 19  1450 19  0644   SODIUM mmol/L 141 134* 137 139   POTASSIUM mmol/L 4.2 4.5 4.4 3.6   CHLORIDE mmol/L 105 101 102 105   CO2 mmol/L 25.7 23.1 19.6* 25.5   BUN mg/dL 20 25* 29* 21   CREATININE mg/dL 0.62 0.84 1.29* 0.66   GLUCOSE mg/dL 116* 212* 103* 111*   CALCIUM mg/dL 9.1 9.5 9.0 8.9     Estimated Creatinine  Clearance: 57.6 mL/min (by C-G formula based on SCr of 0.62 mg/dL).      Results from last 7 days   Lab Units 09/06/19  1213 09/06/19  0732 09/05/19  2028 09/05/19  1659 09/04/19  1649 09/04/19  1205 09/04/19  0743 09/03/19  1939   GLUCOSE mg/dL 150* 114 73 217* 89 136* 112 148*                       Invalid input(s):  PHOS        Invalid input(s): LDLCALC  Results from last 7 days   Lab Units 09/02/19  0644   WBC 10*3/mm3 5.16   HEMOGLOBIN g/dL 9.8*   HEMATOCRIT % 31.4*   PLATELETS 10*3/mm3 246   MCV fL 92.4   MCH pg 28.8   MCHC g/dL 31.2*   RDW % 14.6   RDW-SD fl 49.2   MPV fL 10.6   NEUTROPHIL % % 70.2   LYMPHOCYTE % % 17.4*   MONOCYTES % % 7.9   EOSINOPHIL % % 3.3   BASOPHIL % % 0.8   IMM GRAN % % 0.4   NEUTROS ABS 10*3/mm3 3.62   LYMPHS ABS 10*3/mm3 0.90   MONOS ABS 10*3/mm3 0.41   EOS ABS 10*3/mm3 0.17   BASOS ABS 10*3/mm3 0.04   IMMATURE GRANS (ABS) 10*3/mm3 0.02   NRBC /100 WBC 0.0                                           Imaging:  Imaging Results (last 24 hours)     ** No results found for the last 24 hours. **          I reviewed the patient's new clinical results and medications.         Medication Review:   Scheduled Meds:    [START ON 9/7/2019] amLODIPine 5 mg Oral Q24H   apixaban 5 mg Oral Q12H   aspirin 325 mg Oral Daily   atorvastatin 80 mg Oral Nightly   brimonidine 1 drop Both Eyes BID   clopidogrel 75 mg Oral Daily   dextromethorphan-quinidine 1 capsule Oral BID   dorzolamide 1 drop Both Eyes BID   gabapentin 100 mg Oral BID   glipiZIDE 5 mg Oral BID AC   hydrALAZINE 25 mg Oral Q8H   insulin lispro 0-7 Units Subcutaneous 4x Daily With Meals & Nightly   LORazepam 0.5 mg Oral BID   losartan 100 mg Oral Q24H   melatonin 3 mg Oral Nightly   metFORMIN 1,000 mg Oral BID With Meals   nebivolol 20 mg Oral Daily   OLANZapine 2.5 mg Oral BID   pantoprazole 40 mg Oral BID AC   PARoxetine 10 mg Oral Daily   potassium chloride 20 mEq Oral Daily With Breakfast   vitamin D 50,000 Units Oral Q7 Days      Continuous Infusions:   PRN Meds:.•  acetaminophen  •  aluminum-magnesium hydroxide-simethicone  •  dextrose  •  dextrose  •  glucagon (human recombinant)  •  LORazepam  •  nitroglycerin  •  OLANZapine    Problem List:     Active Hospital Problems    Diagnosis  POA   • KIERAN (acute kidney injury) (CMS/[facility]) [N17.9]  Unknown   • Hypotension [I95.9]  Unknown   • Dysthymic disorder [F34.1]  Yes      Resolved Hospital Problems    Diagnosis Date Resolved POA   • Cerebral infarction due to stenosis of left carotid artery (CMS/[facility]) [I63.232] 09/03/2019 Yes       Assessment and Plan:     ·   Dysthymic disorder- per psychiatry.  on 9/4/19. Ativan added and behavior and mood much better today. Management per psychiatry team.   ·   KIERAN (acute kidney injury) (CMS/[facility])-Baseline creatinine 0.6 and went up to 1.24 now <1.  Likely from hypotension and multiple medications.  Resolved after IVF.   ·   Hypotension with history of hypertension.  HCTZ stopped. Losartan switched to pm dose. Resumed norvasc this am due to increased BP. Continue meds with parameters.     The patient seems medically stable at this time. may continue to follow up with her PCP as an outpatient. Will sign off, please call if any questions or concerns.     I discussed the patients findings and my recommendations with patient and nursing staff.        Nuvia Levy, GETACHEW  09/06/19  2:16 PM

## 2019-09-07 LAB
GLUCOSE BLDC GLUCOMTR-MCNC: 162 MG/DL (ref 70–130)
GLUCOSE BLDC GLUCOMTR-MCNC: 201 MG/DL (ref 70–130)
GLUCOSE BLDC GLUCOMTR-MCNC: 49 MG/DL (ref 70–130)
GLUCOSE BLDC GLUCOMTR-MCNC: 49 MG/DL (ref 70–130)
GLUCOSE BLDC GLUCOMTR-MCNC: 54 MG/DL (ref 70–130)
GLUCOSE BLDC GLUCOMTR-MCNC: 62 MG/DL (ref 70–130)
GLUCOSE BLDC GLUCOMTR-MCNC: 64 MG/DL (ref 70–130)
GLUCOSE BLDC GLUCOMTR-MCNC: 82 MG/DL (ref 70–130)
GLUCOSE BLDC GLUCOMTR-MCNC: 91 MG/DL (ref 70–130)

## 2019-09-07 PROCEDURE — 82962 GLUCOSE BLOOD TEST: CPT

## 2019-09-07 PROCEDURE — 63710000001 INSULIN LISPRO (HUMAN) PER 5 UNITS: Performed by: PHYSICAL MEDICINE & REHABILITATION

## 2019-09-07 RX ADMIN — BRIMONIDINE TARTRATE 1 DROP: 2 SOLUTION OPHTHALMIC at 21:02

## 2019-09-07 RX ADMIN — APIXABAN 5 MG: 5 TABLET, FILM COATED ORAL at 21:03

## 2019-09-07 RX ADMIN — PANTOPRAZOLE SODIUM 40 MG: 40 TABLET, DELAYED RELEASE ORAL at 17:08

## 2019-09-07 RX ADMIN — METFORMIN HYDROCHLORIDE 1000 MG: 1000 TABLET ORAL at 17:08

## 2019-09-07 RX ADMIN — LORAZEPAM 0.5 MG: 0.5 TABLET ORAL at 08:56

## 2019-09-07 RX ADMIN — PAROXETINE HYDROCHLORIDE 10 MG: 10 TABLET, FILM COATED ORAL at 08:56

## 2019-09-07 RX ADMIN — DEXTROMETHORPHAN HYDROBROMIDE AND QUINIDINE SULFATE 1 CAPSULE: 20; 10 CAPSULE, GELATIN COATED ORAL at 08:55

## 2019-09-07 RX ADMIN — ATORVASTATIN CALCIUM 80 MG: 80 TABLET, FILM COATED ORAL at 21:03

## 2019-09-07 RX ADMIN — HYDRALAZINE HYDROCHLORIDE 25 MG: 25 TABLET ORAL at 13:48

## 2019-09-07 RX ADMIN — Medication 3 MG: at 21:03

## 2019-09-07 RX ADMIN — HYDRALAZINE HYDROCHLORIDE 25 MG: 25 TABLET ORAL at 08:54

## 2019-09-07 RX ADMIN — DORZOLAMIDE HYDROCHLORIDE 1 DROP: 20 SOLUTION/ DROPS OPHTHALMIC at 09:05

## 2019-09-07 RX ADMIN — INSULIN LISPRO 3 UNITS: 100 INJECTION, SOLUTION INTRAVENOUS; SUBCUTANEOUS at 21:01

## 2019-09-07 RX ADMIN — DORZOLAMIDE HYDROCHLORIDE 1 DROP: 20 SOLUTION/ DROPS OPHTHALMIC at 21:02

## 2019-09-07 RX ADMIN — AMLODIPINE BESYLATE 5 MG: 5 TABLET ORAL at 08:55

## 2019-09-07 RX ADMIN — GABAPENTIN 100 MG: 100 CAPSULE ORAL at 21:03

## 2019-09-07 RX ADMIN — OLANZAPINE 2.5 MG: 2.5 TABLET, FILM COATED ORAL at 08:53

## 2019-09-07 RX ADMIN — DEXTROMETHORPHAN HYDROBROMIDE AND QUINIDINE SULFATE 1 CAPSULE: 20; 10 CAPSULE, GELATIN COATED ORAL at 21:03

## 2019-09-07 RX ADMIN — HYDRALAZINE HYDROCHLORIDE 25 MG: 25 TABLET ORAL at 21:05

## 2019-09-07 RX ADMIN — APIXABAN 5 MG: 5 TABLET, FILM COATED ORAL at 08:55

## 2019-09-07 RX ADMIN — PANTOPRAZOLE SODIUM 40 MG: 40 TABLET, DELAYED RELEASE ORAL at 08:55

## 2019-09-07 RX ADMIN — POTASSIUM CHLORIDE 20 MEQ: 1.5 POWDER, FOR SOLUTION ORAL at 08:56

## 2019-09-07 RX ADMIN — INSULIN LISPRO 2 UNITS: 100 INJECTION, SOLUTION INTRAVENOUS; SUBCUTANEOUS at 09:03

## 2019-09-07 RX ADMIN — GABAPENTIN 100 MG: 100 CAPSULE ORAL at 08:55

## 2019-09-07 RX ADMIN — GLIPIZIDE 5 MG: 5 TABLET ORAL at 08:54

## 2019-09-07 RX ADMIN — NEBIVOLOL HYDROCHLORIDE 20 MG: 10 TABLET ORAL at 08:54

## 2019-09-07 RX ADMIN — BRIMONIDINE TARTRATE 1 DROP: 2 SOLUTION OPHTHALMIC at 09:05

## 2019-09-07 RX ADMIN — CLOPIDOGREL 75 MG: 75 TABLET, FILM COATED ORAL at 08:55

## 2019-09-07 RX ADMIN — METFORMIN HYDROCHLORIDE 1000 MG: 1000 TABLET ORAL at 08:54

## 2019-09-07 RX ADMIN — LORAZEPAM 0.5 MG: 0.5 TABLET ORAL at 21:03

## 2019-09-07 RX ADMIN — OLANZAPINE 2.5 MG: 2.5 TABLET, FILM COATED ORAL at 21:03

## 2019-09-07 RX ADMIN — ASPIRIN 325 MG: 325 TABLET ORAL at 08:55

## 2019-09-07 NOTE — NURSING NOTE
Spoke with Dr. basilio regarding hypoglycemia secondary to glipizide. Glipizide discontinued per doctor's order`. Monitor blood sugars more closely. Continuing to monitor.

## 2019-09-07 NOTE — PROGRESS NOTES
Called for hypoglycemia - this is definitely to be secondary to sulfonylurea which is now discontinued    Patient can continue on metformin and sliding scale insulin    Per discussion with RN, patient alert and taking oral intake without issue and blood sugars have already shown an upward trend to 91.  I counseled the RN to try to check blood sugars as frequently as we can over the next 24 hours if they can do them every hour or 2 that would be fantastic but ultimately this should resolve with removal of insulting medication.  Doubtful will need to start patient on D5 or D10 IVF if sugars continue to equilibrate stabilize

## 2019-09-07 NOTE — PROGRESS NOTES
"Patient is seen, evaluated, and chart reviewed. Discussed with staff.  Patient is seen for Dr. Heaton.    Staff reports that patient has been compliant with medications.  She has been noted to be much more calm and less irritable.    On examination, patient is found in her room.  She is oriented only to self.  Mood described as \"fine.\"  Affect congruent.  No SI/HI/AVH.  Thought processes are somewhat disorganized.  Judgement and insight is poor.    No medication side effects.  Patient is provided with supportive therapy.  Continue current medications, therapy, and inpatient treatment plan for safety and stabilization.  Disposition is pending.  "

## 2019-09-07 NOTE — PLAN OF CARE
Problem: Patient Care Overview  Goal: Plan of Care Review  Outcome: Ongoing (interventions implemented as appropriate)   09/07/19 0400 09/07/19 0432   Plan of Care Review   Progress --  no change   OTHER   Outcome Summary --  Pt has remained in bed during most of the shift. Pt was compliant with medications crushed in pudding. Pt remains confused, but is less irritable than previously noted. Gait remains unsteady. Will continue to monitor.    Coping/Psychosocial   Plan of Care Reviewed With patient --    Coping/Psychosocial   Patient Agreement with Plan of Care agrees --        Problem: Overarching Goals (Adult)  Goal: Adheres to Safety Considerations for Self and Others  Outcome: Ongoing (interventions implemented as appropriate)   09/07/19 0432   Overarching Goals (Adult)   Adheres to Safety Considerations for Self and Others making progress toward outcome     Intervention: Develop and Maintain Individualized Safety Plan   08/30/19 1400 09/07/19 0200 09/07/19 0400   Violence Risk   Feels Like Hurting Others --  --  no   Previous Attempt to Harm Others --  --  no   Precipitating Factors (Confusion) --  --    C-SSRS (Screen-Recent) Past Month   Q1 Wished to be Dead (Past Month) --  --  no   Q2 Suicidal Thoughts (Past Month) --  --  no   Q6 Suicide Behavior (Lifetime) --  --  no   Level of Risk per Screen --  --  screen negative   Develop and Maintain Individualized Safety Plan   Safety Measures --  safety rounds completed --        Goal: Optimized Coping Skills in Response to Life Stressors  Outcome: Ongoing (interventions implemented as appropriate)   09/07/19 0432   Overarching Goals (Adult)   Optimized Coping Skills in Response to Life Stressors making progress toward outcome     Intervention: Promote Effective Coping Strategies   09/07/19 0400   Coping/Psychosocial Interventions   Supportive Measures active listening utilized;verbalization of feelings encouraged       Goal: Develops/Participates in Therapeutic  [place] to Support Successful Transition  Outcome: Ongoing (interventions implemented as appropriate)   09/07/19 0432   Overarching Goals (Adult)   Develops/Participates in Therapeutic [place] to Support Successful Transition making progress toward outcome     Intervention: Foster Therapeutic [place]   09/07/19 0400   Interventions   Trust Relationship/Rapport care explained;thoughts/feelings acknowledged     Intervention: Mutually Develop Transition Plan   09/07/19 0400   Mutually Develop Transition Plan   Transition Support crisis management plan promoted         Problem: Decreased Participation/Engagement (Depressive Signs/Symptoms) (Adult)  Goal: Increased Participation/Engagement (Depressive Signs/Symptoms)  Outcome: Ongoing (interventions implemented as appropriate)   09/07/19 0432   Increased Participation/Engagement (Depressive Signs/Symptoms)   Increased Participation/Engagement Time Frame for Action Step (STG) 1 day   Increased Participation/Engagement Action Step (STG) Outcome making progress toward outcome       Problem: Social/Occupational/Functional Impairment (Depressive Signs/Symptoms) (Adult)  Goal: Improved Social/Occupational/Functional Skills (Depressive Signs/Symptoms)  Outcome: Ongoing (interventions implemented as appropriate)   09/07/19 0432   Improved Social/Occupational/Functional Skills (Depressive Signs/Symptoms)   Improved Social/Occupational/Functional Skills Time Frame for Action Step (STG) 1 day   Improved Social/Occupational/Functional Skills Action Step (STG) Outcome making progress toward outcome       Problem: Psychomotor Impairment (Depressive Signs/Symptoms) (Adult)  Goal: Improved Psychomotor Symptoms (Depressive Signs/Symptoms)  Outcome: Ongoing (interventions implemented as appropriate)   09/07/19 0432   Improved Psychomotor Symptoms (Depressive Signs/Symptoms)   Improved Psychomotor Symptoms Time Frame for Action Step (STG) 1 day   Improved Psychomotor Symptoms Action  Step (STG) Outcome making progress toward outcome       Problem: Cognitive Impairment (Dementia Signs/Symptoms) (Adult)  Goal: Optimized Cognitive Function (Dementia Signs/Symptoms)  Outcome: Ongoing (interventions implemented as appropriate)   09/07/19 0432   Optimized Cognitive Function (Dementia Signs/Symptoms)   Optimized Cognitive Ability Time Frame for Action Step (STG) 1 day   Optimized Cognitive Ability Action Step (STG) Outcome making progress toward outcome       Problem: Behavioral Impairment (Dementia Signs/Symptoms) (Adult)  Goal: Improved Behavioral Control (Dementia Signs/Symptoms)  Outcome: Ongoing (interventions implemented as appropriate)   09/07/19 0432   Improved Behavioral Control (Dementia Signs/Symptoms)   Improved Behavioral Control Time Frame for Action Step (STG) 1 day   Improved Behavioral Control Action Step (STG) Outcome making progress toward outcome       Problem: Psychological Impairment (Dementia Signs/Symptoms) (Adult)  Goal: Improved Psychological Symptoms (Dementia Signs/Symptoms)  Outcome: Ongoing (interventions implemented as appropriate)   09/07/19 0432   Improved Psychological Symptoms (Dementia Signs/Symptoms)   Improved Psychologic Symptoms Time Frame for Action Step (STG) 1 day   Improved Psychological Symptoms Action Step (STG) Outcome making progress toward outcome       Problem: Fall Risk (Adult)  Goal: Absence of Fall  Outcome: Ongoing (interventions implemented as appropriate)   09/07/19 0432   Fall Risk (Adult)   Absence of Fall making progress toward outcome

## 2019-09-07 NOTE — NURSING NOTE
Lunchtime blood sugar checked showed hypoglycemia  1202- 62 glucose   1206- 4 oz of OJ, patient also eating lunch tray  1227- 49 glucose  1230- 4 oz of OJ given. Patient eating lunch tray  1251- 49 glucose  1256- 54 glucose  1257- 4 oz OJ given. Patient eating lunch tray  1314- 64 glucose  1352- 91 glucose    Patient alert and eating during whole episode. Was in no acute distress. [facility] notified. See previous note and orders. Continuing to monitor.

## 2019-09-07 NOTE — PLAN OF CARE
Problem: Patient Care Overview  Goal: Plan of Care Review  Outcome: Ongoing (interventions implemented as appropriate)   09/07/19 1628   Plan of Care Review   Progress improving   OTHER   Outcome Summary Patient able to answer yes/no. Denied any anxiety, depression, SI, HI, pain, or halluciantions. Hypoglycemia episode during lunch time. Has resoulved. See previous note. Has been withdrawn to room. Cooperative with medications crushed in pudding and has taken some whole. Pleasent and cooperative throughout the day. continuing to monitor.    Coping/Psychosocial   Plan of Care Reviewed With patient   Coping/Psychosocial   Patient Agreement with Plan of Care agrees     Goal: Individualization and Mutuality  Outcome: Ongoing (interventions implemented as appropriate)    Goal: Discharge Needs Assessment  Outcome: Ongoing (interventions implemented as appropriate)    Goal: Interprofessional Rounds/Family Conf  Outcome: Ongoing (interventions implemented as appropriate)      Problem: Overarching Goals (Adult)  Goal: Adheres to Safety Considerations for Self and Others  Outcome: Ongoing (interventions implemented as appropriate)   09/07/19 1628   Overarching Goals (Adult)   Adheres to Safety Considerations for Self and Others making progress toward outcome     Intervention: Develop and Maintain Individualized Safety Plan   09/07/19 1015 09/07/19 1628   Develop and Maintain Individualized Safety Plan   Safety Measures --  safety rounds completed;suicide assessment completed   C-SSRS (Screen-Recent) Past Month   Q1 Wished to be Dead (Past Month) no --    Q2 Suicidal Thoughts (Past Month) no --    Q6 Suicide Behavior (Lifetime) no --    Level of Risk per Screen screen negative --    Violence Risk   Feels Like Hurting Others no --        Goal: Optimized Coping Skills in Response to Life Stressors  Outcome: Ongoing (interventions implemented as appropriate)   09/07/19 1628   Overarching Goals (Adult)   Optimized Coping Skills in  Response to Life Stressors making progress toward outcome     Intervention: Promote Effective Coping Strategies   09/07/19 1015   Coping/Psychosocial Interventions   Supportive Measures active listening utilized;relaxation techniques promoted;self-care encouraged;self-reflection promoted;self-responsibility promoted;verbalization of feelings encouraged       Goal: Develops/Participates in Therapeutic [place] to Support Successful Transition  Outcome: Ongoing (interventions implemented as appropriate)   09/07/19 1628   Overarching Goals (Adult)   Develops/Participates in Therapeutic [place] to Support Successful Transition making progress toward outcome     Intervention: Foster Therapeutic [place]   09/07/19 1015   Interventions   Trust Relationship/Rapport care explained;choices provided;emotional support provided;empathic listening provided;questions answered;questions encouraged;reassurance provided;thoughts/feelings acknowledged         Problem: Decreased Participation/Engagement (Depressive Signs/Symptoms) (Adult)  Goal: Increased Participation/Engagement (Depressive Signs/Symptoms)  Outcome: Ongoing (interventions implemented as appropriate)   09/07/19 1628   Increased Participation/Engagement (Depressive Signs/Symptoms)   Increased Participation/Engagement Time Frame for Action Step (STG) 1 day   Increased Participation/Engagement Action Step (STG) Outcome making progress toward outcome       Problem: Social/Occupational/Functional Impairment (Depressive Signs/Symptoms) (Adult)  Goal: Improved Social/Occupational/Functional Skills (Depressive Signs/Symptoms)  Outcome: Ongoing (interventions implemented as appropriate)   09/07/19 1628   Improved Social/Occupational/Functional Skills (Depressive Signs/Symptoms)   Improved Social/Occupational/Functional Skills Time Frame for Action Step (STG) 1 day   Improved Social/Occupational/Functional Skills Action Step (STG) Outcome making progress toward outcome        Problem: Psychomotor Impairment (Depressive Signs/Symptoms) (Adult)  Goal: Improved Psychomotor Symptoms (Depressive Signs/Symptoms)  Outcome: Ongoing (interventions implemented as appropriate)   09/07/19 1628   Improved Psychomotor Symptoms (Depressive Signs/Symptoms)   Improved Psychomotor Symptoms Time Frame for Action Step (STG) 1 day   Improved Psychomotor Symptoms Action Step (STG) Outcome making progress toward outcome       Problem: Cognitive Impairment (Dementia Signs/Symptoms) (Adult)  Goal: Optimized Cognitive Function (Dementia Signs/Symptoms)  Outcome: Ongoing (interventions implemented as appropriate)   09/07/19 1628   Optimized Cognitive Function (Dementia Signs/Symptoms)   Optimized Cognitive Ability Time Frame for Action Step (STG) 1 day   Optimized Cognitive Ability Action Step (STG) Outcome making progress toward outcome       Problem: Behavioral Impairment (Dementia Signs/Symptoms) (Adult)  Goal: Improved Behavioral Control (Dementia Signs/Symptoms)  Outcome: Ongoing (interventions implemented as appropriate)   09/07/19 1628   Improved Behavioral Control (Dementia Signs/Symptoms)   Improved Behavioral Control Time Frame for Action Step (STG) 1 day   Improved Behavioral Control Action Step (STG) Outcome making progress toward outcome       Problem: Psychological Impairment (Dementia Signs/Symptoms) (Adult)  Goal: Improved Psychological Symptoms (Dementia Signs/Symptoms)  Outcome: Ongoing (interventions implemented as appropriate)   09/07/19 1628   Improved Psychological Symptoms (Dementia Signs/Symptoms)   Improved Psychologic Symptoms Time Frame for Action Step (STG) 1 day   Improved Psychological Symptoms Action Step (STG) Outcome making progress toward outcome       Problem: Skin Injury Risk (Adult)  Goal: Skin Health and Integrity  Outcome: Ongoing (interventions implemented as appropriate)   09/07/19 1628   Skin Injury Risk (Adult)   Skin Health and Integrity making progress toward  outcome       Problem: Fall Risk (Adult)  Goal: Absence of Fall  Outcome: Ongoing (interventions implemented as appropriate)   09/07/19 0649   Fall Risk (Adult)   Absence of Fall making progress toward outcome

## 2019-09-07 NOTE — PLAN OF CARE
Problem: Patient Care Overview  Goal: Plan of Care Review  Outcome: Ongoing (interventions implemented as appropriate)   09/06/19 1130 09/06/19 1343 09/06/19 1859   OTHER   Outcome Summary --  --  Pt was calm and cooperative during the shift. Pt was alert, but disorientad x4. Pt compliant with care and medications Pt was pleasant agreeable today. At around  1700, pt blood sugar was 48, but pt was alert awake and not lethargic. The hypoglycemic protocol was done, and blood sugar went up to 80. Will continue to monitor behavior, provide support and safety environment.   Coping/Psychosocial   Plan of Care Reviewed With --  patient --    Coping/Psychosocial   Patient Agreement with Plan of Care agrees --  --        Problem: Overarching Goals (Adult)  Goal: Adheres to Safety Considerations for Self and Others  Outcome: Ongoing (interventions implemented as appropriate)   09/06/19 1859   Overarching Goals (Adult)   Adheres to Safety Considerations for Self and Others making progress toward outcome     Intervention: Develop and Maintain Individualized Safety Plan   08/30/19 1400 09/06/19 1130 09/06/19 1800   Violence Risk   Feels Like Hurting Others --  no --    Previous Attempt to Harm Others --  no --    Precipitating Factors (Confusion) --  --    C-SSRS (Screen-Recent) Past Month   Q1 Wished to be Dead (Past Month) --  no --    Q2 Suicidal Thoughts (Past Month) --  no --    Q6 Suicide Behavior (Lifetime) --  no --    Level of Risk per Screen --  screen negative --    Develop and Maintain Individualized Safety Plan   Safety Measures --  --  safety rounds completed       Goal: Optimized Coping Skills in Response to Life Stressors  Outcome: Ongoing (interventions implemented as appropriate)   09/06/19 1859   Overarching Goals (Adult)   Optimized Coping Skills in Response to Life Stressors making progress toward outcome     Intervention: Promote Effective Coping Strategies   09/06/19 1130   Coping/Psychosocial Interventions    Supportive Measures active listening utilized;self-care encouraged       Goal: Develops/Participates in Therapeutic [place] to Support Successful Transition  Outcome: Ongoing (interventions implemented as appropriate)   09/06/19 1859   Overarching Goals (Adult)   Develops/Participates in Therapeutic [place] to Support Successful Transition making progress toward outcome     Intervention: Foster Therapeutic [place]   09/06/19 1130   Interventions   Trust Relationship/Rapport care explained;choices provided;empathic listening provided;questions encouraged;reassurance provided     Intervention: Mutually Develop Transition Plan   09/06/19 1130   Mutually Develop Transition Plan   Transition Support crisis management plan promoted         Problem: Decreased Participation/Engagement (Depressive Signs/Symptoms) (Adult)  Goal: Increased Participation/Engagement (Depressive Signs/Symptoms)  Outcome: Ongoing (interventions implemented as appropriate)   09/06/19 1859   Increased Participation/Engagement (Depressive Signs/Symptoms)   Increased Participation/Engagement Time Frame for Action Step (STG) 1 day   Increased Participation/Engagement Action Step (STG) Outcome making progress toward outcome       Problem: Social/Occupational/Functional Impairment (Depressive Signs/Symptoms) (Adult)  Goal: Improved Social/Occupational/Functional Skills (Depressive Signs/Symptoms)  Outcome: Ongoing (interventions implemented as appropriate)   09/06/19 1859   Improved Social/Occupational/Functional Skills (Depressive Signs/Symptoms)   Improved Social/Occupational/Functional Skills Time Frame for Action Step (STG) 1 day   Improved Social/Occupational/Functional Skills Action Step (STG) Outcome making progress toward outcome       Problem: Psychomotor Impairment (Depressive Signs/Symptoms) (Adult)  Goal: Improved Psychomotor Symptoms (Depressive Signs/Symptoms)  Outcome: Ongoing (interventions implemented as appropriate)   09/06/19 1859    Improved Psychomotor Symptoms (Depressive Signs/Symptoms)   Improved Psychomotor Symptoms Time Frame for Action Step (STG) 1 day   Improved Psychomotor Symptoms Action Step (STG) Outcome making progress toward outcome       Problem: Cognitive Impairment (Dementia Signs/Symptoms) (Adult)  Goal: Optimized Cognitive Function (Dementia Signs/Symptoms)  Outcome: Ongoing (interventions implemented as appropriate)   09/06/19 1859   Optimized Cognitive Function (Dementia Signs/Symptoms)   Optimized Cognitive Ability Time Frame for Action Step (STG) 1 day       Problem: Behavioral Impairment (Dementia Signs/Symptoms) (Adult)  Goal: Improved Behavioral Control (Dementia Signs/Symptoms)  Outcome: Ongoing (interventions implemented as appropriate)   09/06/19 1859   Improved Behavioral Control (Dementia Signs/Symptoms)   Improved Behavioral Control Time Frame for Action Step (STG) 1 day   Improved Behavioral Control Action Step (STG) Outcome making progress toward outcome       Problem: Psychological Impairment (Dementia Signs/Symptoms) (Adult)  Goal: Improved Psychological Symptoms (Dementia Signs/Symptoms)  Outcome: Ongoing (interventions implemented as appropriate)   09/06/19 1859   Improved Psychological Symptoms (Dementia Signs/Symptoms)   Improved Psychologic Symptoms Time Frame for Action Step (STG) 1 day   Improved Psychological Symptoms Action Step (STG) Outcome making progress toward outcome       Problem: Skin Injury Risk (Adult)  Goal: Skin Health and Integrity  Outcome: Ongoing (interventions implemented as appropriate)   09/06/19 1859   Skin Injury Risk (Adult)   Skin Health and Integrity making progress toward outcome       Problem: Fall Risk (Adult)  Goal: Absence of Fall  Outcome: Ongoing (interventions implemented as appropriate)   09/06/19 1859   Fall Risk (Adult)   Absence of Fall making progress toward outcome

## 2019-09-08 LAB
GLUCOSE BLDC GLUCOMTR-MCNC: 121 MG/DL (ref 70–130)
GLUCOSE BLDC GLUCOMTR-MCNC: 126 MG/DL (ref 70–130)
GLUCOSE BLDC GLUCOMTR-MCNC: 133 MG/DL (ref 70–130)
GLUCOSE BLDC GLUCOMTR-MCNC: 157 MG/DL (ref 70–130)

## 2019-09-08 PROCEDURE — 82962 GLUCOSE BLOOD TEST: CPT

## 2019-09-08 PROCEDURE — 63710000001 INSULIN LISPRO (HUMAN) PER 5 UNITS: Performed by: PHYSICAL MEDICINE & REHABILITATION

## 2019-09-08 RX ADMIN — Medication 3 MG: at 20:52

## 2019-09-08 RX ADMIN — LOSARTAN POTASSIUM 100 MG: 100 TABLET, FILM COATED ORAL at 17:18

## 2019-09-08 RX ADMIN — AMLODIPINE BESYLATE 5 MG: 5 TABLET ORAL at 09:02

## 2019-09-08 RX ADMIN — PANTOPRAZOLE SODIUM 40 MG: 40 TABLET, DELAYED RELEASE ORAL at 09:04

## 2019-09-08 RX ADMIN — GABAPENTIN 100 MG: 100 CAPSULE ORAL at 20:52

## 2019-09-08 RX ADMIN — APIXABAN 5 MG: 5 TABLET, FILM COATED ORAL at 20:53

## 2019-09-08 RX ADMIN — HYDRALAZINE HYDROCHLORIDE 25 MG: 25 TABLET ORAL at 14:50

## 2019-09-08 RX ADMIN — PAROXETINE HYDROCHLORIDE 10 MG: 10 TABLET, FILM COATED ORAL at 09:03

## 2019-09-08 RX ADMIN — ATORVASTATIN CALCIUM 80 MG: 80 TABLET, FILM COATED ORAL at 20:52

## 2019-09-08 RX ADMIN — DORZOLAMIDE HYDROCHLORIDE 1 DROP: 20 SOLUTION/ DROPS OPHTHALMIC at 09:16

## 2019-09-08 RX ADMIN — HYDRALAZINE HYDROCHLORIDE 25 MG: 25 TABLET ORAL at 09:01

## 2019-09-08 RX ADMIN — LORAZEPAM 0.5 MG: 0.5 TABLET ORAL at 09:03

## 2019-09-08 RX ADMIN — OLANZAPINE 2.5 MG: 2.5 TABLET, FILM COATED ORAL at 20:53

## 2019-09-08 RX ADMIN — DEXTROMETHORPHAN HYDROBROMIDE AND QUINIDINE SULFATE 1 CAPSULE: 20; 10 CAPSULE, GELATIN COATED ORAL at 09:02

## 2019-09-08 RX ADMIN — APIXABAN 5 MG: 5 TABLET, FILM COATED ORAL at 09:03

## 2019-09-08 RX ADMIN — BRIMONIDINE TARTRATE 1 DROP: 2 SOLUTION OPHTHALMIC at 20:51

## 2019-09-08 RX ADMIN — METFORMIN HYDROCHLORIDE 1000 MG: 1000 TABLET ORAL at 09:00

## 2019-09-08 RX ADMIN — INSULIN LISPRO 2 UNITS: 100 INJECTION, SOLUTION INTRAVENOUS; SUBCUTANEOUS at 20:53

## 2019-09-08 RX ADMIN — ASPIRIN 325 MG: 325 TABLET ORAL at 09:02

## 2019-09-08 RX ADMIN — GABAPENTIN 100 MG: 100 CAPSULE ORAL at 09:04

## 2019-09-08 RX ADMIN — METFORMIN HYDROCHLORIDE 1000 MG: 1000 TABLET ORAL at 17:18

## 2019-09-08 RX ADMIN — OLANZAPINE 2.5 MG: 2.5 TABLET, FILM COATED ORAL at 09:00

## 2019-09-08 RX ADMIN — LORAZEPAM 0.5 MG: 0.5 TABLET ORAL at 20:53

## 2019-09-08 RX ADMIN — BRIMONIDINE TARTRATE 1 DROP: 2 SOLUTION OPHTHALMIC at 09:16

## 2019-09-08 RX ADMIN — PANTOPRAZOLE SODIUM 40 MG: 40 TABLET, DELAYED RELEASE ORAL at 17:18

## 2019-09-08 RX ADMIN — HYDRALAZINE HYDROCHLORIDE 25 MG: 25 TABLET ORAL at 23:21

## 2019-09-08 RX ADMIN — DEXTROMETHORPHAN HYDROBROMIDE AND QUINIDINE SULFATE 1 CAPSULE: 20; 10 CAPSULE, GELATIN COATED ORAL at 20:52

## 2019-09-08 RX ADMIN — CLOPIDOGREL 75 MG: 75 TABLET, FILM COATED ORAL at 09:02

## 2019-09-08 RX ADMIN — NEBIVOLOL HYDROCHLORIDE 20 MG: 10 TABLET ORAL at 09:01

## 2019-09-08 RX ADMIN — DORZOLAMIDE HYDROCHLORIDE 1 DROP: 20 SOLUTION/ DROPS OPHTHALMIC at 20:51

## 2019-09-08 RX ADMIN — POTASSIUM CHLORIDE 20 MEQ: 1.5 POWDER, FOR SOLUTION ORAL at 09:08

## 2019-09-08 NOTE — PLAN OF CARE
Problem: Patient Care Overview  Goal: Plan of Care Review  Outcome: Ongoing (interventions implemented as appropriate)   09/08/19 0124 09/08/19 0434   Plan of Care Review   Progress --  improving   OTHER   Outcome Summary --  Pt has been cooperative with medications crushed in pudding. Pt irritable but denies anxiety, depression and pain. Will continue to monitor mood and behavior and provide a safe envionment.   Coping/Psychosocial   Plan of Care Reviewed With patient --    Coping/Psychosocial   Patient Agreement with Plan of Care agrees --        Problem: Overarching Goals (Adult)  Goal: Adheres to Safety Considerations for Self and Others  Outcome: Ongoing (interventions implemented as appropriate)    Goal: Optimized Coping Skills in Response to Life Stressors  Outcome: Ongoing (interventions implemented as appropriate)    Goal: Develops/Participates in Therapeutic [place] to Support Successful Transition  Outcome: Ongoing (interventions implemented as appropriate)      Problem: Decreased Participation/Engagement (Depressive Signs/Symptoms) (Adult)  Goal: Increased Participation/Engagement (Depressive Signs/Symptoms)  Outcome: Ongoing (interventions implemented as appropriate)      Problem: Social/Occupational/Functional Impairment (Depressive Signs/Symptoms) (Adult)  Goal: Improved Social/Occupational/Functional Skills (Depressive Signs/Symptoms)  Outcome: Ongoing (interventions implemented as appropriate)      Problem: Psychomotor Impairment (Depressive Signs/Symptoms) (Adult)  Goal: Improved Psychomotor Symptoms (Depressive Signs/Symptoms)  Outcome: Ongoing (interventions implemented as appropriate)      Problem: Cognitive Impairment (Dementia Signs/Symptoms) (Adult)  Goal: Optimized Cognitive Function (Dementia Signs/Symptoms)  Outcome: Ongoing (interventions implemented as appropriate)      Problem: Behavioral Impairment (Dementia Signs/Symptoms) (Adult)  Goal: Improved Behavioral Control (Dementia  Signs/Symptoms)  Outcome: Ongoing (interventions implemented as appropriate)      Problem: Psychological Impairment (Dementia Signs/Symptoms) (Adult)  Goal: Improved Psychological Symptoms (Dementia Signs/Symptoms)  Outcome: Ongoing (interventions implemented as appropriate)      Problem: Skin Injury Risk (Adult)  Goal: Skin Health and Integrity  Outcome: Ongoing (interventions implemented as appropriate)      Problem: Fall Risk (Adult)  Goal: Absence of Fall  Outcome: Ongoing (interventions implemented as appropriate)

## 2019-09-08 NOTE — PROGRESS NOTES
"Patient is seen, evaluated, and chart reviewed. Discussed with staff.  Patient is seen for Dr. Heaton.    Staff reports that patient has been compliant with medications.  There have been no behavioral issues.    On examination, patient is found in the milieu, eating lunch.  She is oriented only to person.  Mood is \"fine.\"  Affect congruent.  No SI/HI/AVH.  Thought processes are somewhat disorganized.  Judgement and insight is poor.    No medication side effects.  Patient is provided with supportive therapy.  Continue current medications, therapy, and inpatient treatment plan for safety and stabilization.  Disposition is pending.  Dr. Heaton will resume care tomorrow.  "

## 2019-09-08 NOTE — PLAN OF CARE
"Problem: Patient Care Overview  Goal: Plan of Care Review  Outcome: Ongoing (interventions implemented as appropriate)   09/08/19 1637   Plan of Care Review   Progress improving   OTHER   Outcome Summary Patient marlene fraser anxiety, depression, SI, HI, pain, or halluciantions. Patient has been able to verbalize mofr etoday saying things like \"sounds good,\" \"Thank you\" and \"go away.\"Has been pleasent throughout the day. Gait seemed more unsteady today. Cooperative with medications crushed in pudding and was able to take some whole. Cooperative with hands on care. Continuing to monitor.    Coping/Psychosocial   Plan of Care Reviewed With patient   Coping/Psychosocial   Patient Agreement with Plan of Care agrees     Goal: Individualization and Mutuality  Outcome: Ongoing (interventions implemented as appropriate)    Goal: Discharge Needs Assessment  Outcome: Ongoing (interventions implemented as appropriate)    Goal: Interprofessional Rounds/Family Conf  Outcome: Ongoing (interventions implemented as appropriate)      Problem: Overarching Goals (Adult)  Goal: Adheres to Safety Considerations for Self and Others  Outcome: Ongoing (interventions implemented as appropriate)   09/08/19 1637   Overarching Goals (Adult)   Adheres to Safety Considerations for Self and Others making progress toward outcome     Intervention: Develop and Maintain Individualized Safety Plan   09/08/19 1105   Develop and Maintain Individualized Safety Plan   Safety Measures safety rounds completed   C-SSRS (Screen-Recent) Past Month   Q1 Wished to be Dead (Past Month) no   Q2 Suicidal Thoughts (Past Month) no   Q6 Suicide Behavior (Lifetime) no   Level of Risk per Screen screen negative   Violence Risk   Feels Like Hurting Others no       Goal: Optimized Coping Skills in Response to Life Stressors  Outcome: Ongoing (interventions implemented as appropriate)   09/08/19 1637   Overarching Goals (Adult)   Optimized Coping Skills in Response to Life " Stressors making progress toward outcome     Intervention: Promote Effective Coping Strategies   09/08/19 1105   Coping/Psychosocial Interventions   Supportive Measures active listening utilized;relaxation techniques promoted;self-care encouraged;self-reflection promoted;self-responsibility promoted;verbalization of feelings encouraged       Goal: Develops/Participates in Therapeutic [place] to Support Successful Transition  Outcome: Ongoing (interventions implemented as appropriate)   09/08/19 1637   Overarching Goals (Adult)   Develops/Participates in Therapeutic [place] to Support Successful Transition making progress toward outcome     Intervention: Foster Therapeutic [place]   09/08/19 1105   Interventions   Trust Relationship/Rapport care explained;choices provided;emotional support provided;empathic listening provided;questions answered;questions encouraged;reassurance provided;thoughts/feelings acknowledged         Problem: Decreased Participation/Engagement (Depressive Signs/Symptoms) (Adult)  Goal: Increased Participation/Engagement (Depressive Signs/Symptoms)  Outcome: Ongoing (interventions implemented as appropriate)   09/08/19 1637   Increased Participation/Engagement (Depressive Signs/Symptoms)   Increased Participation/Engagement Action Step/Short Term Goal (STG) Established 09/08/19   Increased Participation/Engagement Time Frame for Action Step (STG) 4 days   Increased Participation/Engagement Action Step (STG) Outcome making progress toward outcome       Problem: Social/Occupational/Functional Impairment (Depressive Signs/Symptoms) (Adult)  Goal: Improved Social/Occupational/Functional Skills (Depressive Signs/Symptoms)  Outcome: Ongoing (interventions implemented as appropriate)   09/08/19 1637   Improved Social/Occupational/Functional Skills (Depressive Signs/Symptoms)   Improved Social/Occupational/Functional Skills Action Step/Short Term Goal (STG) Established 09/08/19   Improved  Social/Occupational/Functional Skills Time Frame for Action Step (STG) 4 days   Improved Social/Occupational/Functional Skills Action Step (STG) Outcome making progress toward outcome       Problem: Psychomotor Impairment (Depressive Signs/Symptoms) (Adult)  Goal: Improved Psychomotor Symptoms (Depressive Signs/Symptoms)  Outcome: Ongoing (interventions implemented as appropriate)   09/08/19 1637   Improved Psychomotor Symptoms (Depressive Signs/Symptoms)   Improved Psychomotor Symptoms Action Step/Short Term Goal (STG) Established 09/08/19   Improved Psychomotor Symptoms Time Frame for Action Step (STG) 4 days   Improved Psychomotor Symptoms Action Step (STG) Outcome making progress toward outcome       Problem: Cognitive Impairment (Dementia Signs/Symptoms) (Adult)  Goal: Optimized Cognitive Function (Dementia Signs/Symptoms)  Outcome: Ongoing (interventions implemented as appropriate)   09/08/19 1637   Optimized Cognitive Function (Dementia Signs/Symptoms)   Optimized Cognitive Ability Action Step/Short Term Goal (STG) Established 09/08/19   Optimized Cognitive Ability Time Frame for Action Step (STG) 4 days   Optimized Cognitive Ability Action Step (STG) Outcome making progress toward outcome       Problem: Behavioral Impairment (Dementia Signs/Symptoms) (Adult)  Goal: Improved Behavioral Control (Dementia Signs/Symptoms)  Outcome: Ongoing (interventions implemented as appropriate)   09/08/19 1637   Improved Behavioral Control (Dementia Signs/Symptoms)   Improved Behavior Control Action Step/Short Term Goal (STG) Established 09/08/19   Improved Behavioral Control Time Frame for Action Step (STG) 4 days   Improved Behavioral Control Action Step (STG) Outcome making progress toward outcome       Problem: Psychological Impairment (Dementia Signs/Symptoms) (Adult)  Goal: Improved Psychological Symptoms (Dementia Signs/Symptoms)  Outcome: Ongoing (interventions implemented as appropriate)   09/08/19 1637   Improved  Psychological Symptoms (Dementia Signs/Symptoms)   Improved Psychological Symptoms Action Step/Short Term Goal (STG) Established 09/08/19   Improved Psychologic Symptoms Time Frame for Action Step (STG) 4 days   Improved Psychological Symptoms Action Step (STG) Outcome making progress toward outcome       Problem: Skin Injury Risk (Adult)  Goal: Skin Health and Integrity  Outcome: Ongoing (interventions implemented as appropriate)   09/08/19 1637   Skin Injury Risk (Adult)   Skin Health and Integrity making progress toward outcome       Problem: Fall Risk (Adult)  Goal: Absence of Fall  Outcome: Ongoing (interventions implemented as appropriate)   09/08/19 1637   Fall Risk (Adult)   Absence of Fall making progress toward outcome

## 2019-09-09 LAB
ANION GAP SERPL CALCULATED.3IONS-SCNC: 6.7 MMOL/L (ref 5–15)
BASOPHILS # BLD AUTO: 0.04 10*3/MM3 (ref 0–0.2)
BASOPHILS NFR BLD AUTO: 0.9 % (ref 0–1.5)
BUN BLD-MCNC: 20 MG/DL (ref 8–23)
BUN/CREAT SERPL: 29.4 (ref 7–25)
CALCIUM SPEC-SCNC: 8.9 MG/DL (ref 8.6–10.5)
CHLORIDE SERPL-SCNC: 104 MMOL/L (ref 98–107)
CO2 SERPL-SCNC: 25.3 MMOL/L (ref 22–29)
CREAT BLD-MCNC: 0.68 MG/DL (ref 0.57–1)
DEPRECATED RDW RBC AUTO: 49 FL (ref 37–54)
EOSINOPHIL # BLD AUTO: 0.17 10*3/MM3 (ref 0–0.4)
EOSINOPHIL NFR BLD AUTO: 3.8 % (ref 0.3–6.2)
ERYTHROCYTE [DISTWIDTH] IN BLOOD BY AUTOMATED COUNT: 14.2 % (ref 12.3–15.4)
GFR SERPL CREATININE-BSD FRML MDRD: 84 ML/MIN/1.73
GLUCOSE BLD-MCNC: 116 MG/DL (ref 65–99)
GLUCOSE BLDC GLUCOMTR-MCNC: 101 MG/DL (ref 70–130)
GLUCOSE BLDC GLUCOMTR-MCNC: 113 MG/DL (ref 70–130)
GLUCOSE BLDC GLUCOMTR-MCNC: 123 MG/DL (ref 70–130)
HCT VFR BLD AUTO: 30.4 % (ref 34–46.6)
HGB BLD-MCNC: 9.4 G/DL (ref 12–15.9)
IMM GRANULOCYTES # BLD AUTO: 0.02 10*3/MM3 (ref 0–0.05)
IMM GRANULOCYTES NFR BLD AUTO: 0.4 % (ref 0–0.5)
LYMPHOCYTES # BLD AUTO: 0.86 10*3/MM3 (ref 0.7–3.1)
LYMPHOCYTES NFR BLD AUTO: 19.1 % (ref 19.6–45.3)
MCH RBC QN AUTO: 28.9 PG (ref 26.6–33)
MCHC RBC AUTO-ENTMCNC: 30.9 G/DL (ref 31.5–35.7)
MCV RBC AUTO: 93.5 FL (ref 79–97)
MONOCYTES # BLD AUTO: 0.37 10*3/MM3 (ref 0.1–0.9)
MONOCYTES NFR BLD AUTO: 8.2 % (ref 5–12)
NEUTROPHILS # BLD AUTO: 3.05 10*3/MM3 (ref 1.7–7)
NEUTROPHILS NFR BLD AUTO: 67.6 % (ref 42.7–76)
NRBC BLD AUTO-RTO: 0 /100 WBC (ref 0–0.2)
PLATELET # BLD AUTO: 251 10*3/MM3 (ref 140–450)
PMV BLD AUTO: 10.1 FL (ref 6–12)
POTASSIUM BLD-SCNC: 4 MMOL/L (ref 3.5–5.2)
RBC # BLD AUTO: 3.25 10*6/MM3 (ref 3.77–5.28)
SODIUM BLD-SCNC: 136 MMOL/L (ref 136–145)
WBC NRBC COR # BLD: 4.51 10*3/MM3 (ref 3.4–10.8)

## 2019-09-09 PROCEDURE — 80048 BASIC METABOLIC PNL TOTAL CA: CPT | Performed by: PHYSICAL MEDICINE & REHABILITATION

## 2019-09-09 PROCEDURE — 97110 THERAPEUTIC EXERCISES: CPT

## 2019-09-09 PROCEDURE — 85025 COMPLETE CBC W/AUTO DIFF WBC: CPT | Performed by: PHYSICAL MEDICINE & REHABILITATION

## 2019-09-09 PROCEDURE — 82962 GLUCOSE BLOOD TEST: CPT

## 2019-09-09 PROCEDURE — 97535 SELF CARE MNGMENT TRAINING: CPT | Performed by: OCCUPATIONAL THERAPIST

## 2019-09-09 RX ADMIN — PANTOPRAZOLE SODIUM 40 MG: 40 TABLET, DELAYED RELEASE ORAL at 09:33

## 2019-09-09 RX ADMIN — DEXTROMETHORPHAN HYDROBROMIDE AND QUINIDINE SULFATE 1 CAPSULE: 20; 10 CAPSULE, GELATIN COATED ORAL at 20:58

## 2019-09-09 RX ADMIN — ATORVASTATIN CALCIUM 80 MG: 80 TABLET, FILM COATED ORAL at 20:58

## 2019-09-09 RX ADMIN — LORAZEPAM 0.5 MG: 0.5 TABLET ORAL at 21:00

## 2019-09-09 RX ADMIN — Medication 3 MG: at 20:58

## 2019-09-09 RX ADMIN — DEXTROMETHORPHAN HYDROBROMIDE AND QUINIDINE SULFATE 1 CAPSULE: 20; 10 CAPSULE, GELATIN COATED ORAL at 09:34

## 2019-09-09 RX ADMIN — LOSARTAN POTASSIUM 100 MG: 100 TABLET, FILM COATED ORAL at 19:07

## 2019-09-09 RX ADMIN — DORZOLAMIDE HYDROCHLORIDE 1 DROP: 20 SOLUTION/ DROPS OPHTHALMIC at 20:58

## 2019-09-09 RX ADMIN — GABAPENTIN 100 MG: 100 CAPSULE ORAL at 09:34

## 2019-09-09 RX ADMIN — BRIMONIDINE TARTRATE 1 DROP: 2 SOLUTION OPHTHALMIC at 09:45

## 2019-09-09 RX ADMIN — POTASSIUM CHLORIDE 20 MEQ: 1.5 POWDER, FOR SOLUTION ORAL at 09:46

## 2019-09-09 RX ADMIN — OLANZAPINE 2.5 MG: 2.5 TABLET, FILM COATED ORAL at 20:58

## 2019-09-09 RX ADMIN — ASPIRIN 325 MG: 325 TABLET ORAL at 11:12

## 2019-09-09 RX ADMIN — GABAPENTIN 100 MG: 100 CAPSULE ORAL at 20:58

## 2019-09-09 RX ADMIN — AMLODIPINE BESYLATE 5 MG: 5 TABLET ORAL at 09:34

## 2019-09-09 RX ADMIN — APIXABAN 5 MG: 5 TABLET, FILM COATED ORAL at 20:58

## 2019-09-09 RX ADMIN — CLOPIDOGREL 75 MG: 75 TABLET, FILM COATED ORAL at 09:35

## 2019-09-09 RX ADMIN — LORAZEPAM 0.5 MG: 0.5 TABLET ORAL at 09:34

## 2019-09-09 RX ADMIN — PANTOPRAZOLE SODIUM 40 MG: 40 TABLET, DELAYED RELEASE ORAL at 19:07

## 2019-09-09 RX ADMIN — OLANZAPINE 2.5 MG: 2.5 TABLET, FILM COATED ORAL at 09:34

## 2019-09-09 RX ADMIN — PAROXETINE HYDROCHLORIDE 10 MG: 10 TABLET, FILM COATED ORAL at 09:35

## 2019-09-09 RX ADMIN — NEBIVOLOL HYDROCHLORIDE 20 MG: 10 TABLET ORAL at 09:34

## 2019-09-09 RX ADMIN — BRIMONIDINE TARTRATE 1 DROP: 2 SOLUTION OPHTHALMIC at 20:58

## 2019-09-09 RX ADMIN — HYDRALAZINE HYDROCHLORIDE 25 MG: 25 TABLET ORAL at 09:36

## 2019-09-09 RX ADMIN — METFORMIN HYDROCHLORIDE 1000 MG: 1000 TABLET ORAL at 09:35

## 2019-09-09 RX ADMIN — DORZOLAMIDE HYDROCHLORIDE 1 DROP: 20 SOLUTION/ DROPS OPHTHALMIC at 09:45

## 2019-09-09 RX ADMIN — HYDRALAZINE HYDROCHLORIDE 25 MG: 25 TABLET ORAL at 16:24

## 2019-09-09 RX ADMIN — APIXABAN 5 MG: 5 TABLET, FILM COATED ORAL at 09:35

## 2019-09-09 RX ADMIN — METFORMIN HYDROCHLORIDE 1000 MG: 1000 TABLET ORAL at 19:08

## 2019-09-09 NOTE — PROGRESS NOTES
The patient has had no further episodes of behavioral disturbance and is been compliant with medications and gonzalez routine.  We await word regarding disposition.  Her case is discussed with the treatment team today.

## 2019-09-09 NOTE — THERAPY TREATMENT NOTE
Acute Care - Physical Therapy Treatment Note  [facility]     Patient Name: Darby Workman  : 1945  MRN: [id number]  Today's Date: 2019             Admit Date: 2019    Visit Dx:  No diagnosis found.  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)   • Right-sided headache   • Acute ischemic stroke (CMS/HCC)   • Embolic stroke (CMS/HCC)   • Anticoagulated by anticoagulation treatment   • Stroke (cerebrum) (CMS/HCC)   • Dysthymic disorder   • Hypotension       Therapy Treatment    Rehabilitation Treatment Summary     Row Name 19             Treatment Time/Intention    Discipline  physical therapist  -      Document Type  therapy note (daily note)  -      Subjective Information  no complaints  -      Mode of Treatment  individual therapy;physical therapy  -      Patient/Family Observations  pt supine in bed no distress, appears cold., sweater provided  -      Patient Effort  good  -      Recorded by [] Laura Foster, PT 19      Row Name 19             Cognitive Assessment/Intervention- PT/OT    Affect/Mental Status (Cognitive)  confused  -      Orientation Status (Cognition)  oriented to;person  -      Follows Commands (Cognition)  follows one step commands;75-90% accuracy;repetition of directions required;verbal cues/prompting required  -      Personal Safety Interventions  fall prevention program maintained;gait belt;supervised activity;nonskid shoes/slippers when out of bed  -LH      Recorded by [] Laura Foster, PT 19      Row Name 19             Bed Mobility Assessment/Treatment    Supine-Sit [place] (Bed Mobility)  supervision  -      Sit-Supine [place] (Bed Mobility)  not tested  -LH      Recorded by [] Laura Foster, PT 19       Row Name 09/09/19 0822             Sit-Stand Transfer    Sit-Stand [place] (Transfers)  stand by assist  -      Recorded by [] Laura Foster, PT 09/09/19 0824      Row Name 09/09/19 0822             Stand-Sit Transfer    Stand-Sit [place] (Transfers)  stand by assist  -      Recorded by [] Laura Foster, PT 09/09/19 0824      Row Name 09/09/19 0822             Gait/Stairs Assessment/Training    [place] Level (Gait)  contact guard  -LH      Distance in Feet (Gait)  100  -LH      Pattern (Gait)  step-through  -LH      Deviations/Abnormal Patterns (Gait)  festinating/shuffling;eliza decreased  -LH      Bilateral Gait Deviations  forward flexed posture;heel strike decreased  -LH      Comment (Gait/Stairs)  ambulated from room to chair on unit for breakfast  -LH      Recorded by [] Laura Foster, PT 09/09/19 0824      Row Name 09/09/19 0822             Positioning and Restraints    Pre-Treatment Position  in bed  -LH      Post Treatment Position  chair  -LH      In Chair  sitting;waffle cushion;patient within staff view  -LH      Recorded by [] Laura Foster, PT 09/09/19 0824      Row Name 09/09/19 0822             Pain Scale: Numbers Pre/Post-Treatment    Pain Scale: Numbers, Pretreatment  0/10 - no pain  -      Pain Scale: Numbers, Post-Treatment  0/10 - no pain  -LH      Recorded by [] Laura Foster, PT 09/09/19 0824      Row Name                Wound 07/17/19 1015 Left neck incision    Wound - Properties Group Date first assessed: 07/17/19 [LD] Time first assessed: 1015 [LD] Side: Left [LD] Location: neck [LD] Type: incision [LD] Recorded by:  [LD] Martha Foster RN 07/17/19 1015      User Key  (r) = Recorded By, (t) = Taken By, (c) = Cosigned By    Initials Name Effective Dates Discipline     Laura Foster, PT 04/03/18 -  PT    LD Martha Foster RN 06/16/16 -  Nurse          Wound 07/17/19 1015 Left neck incision (Active)   Dressing Appearance open to air 9/9/2019 12:39 AM    Base dry 9/9/2019 12:39 AM   Periwound ecchymotic 9/8/2019 11:05 AM   Drainage Amount none 9/8/2019 11:05 AM   Dressing Care, Wound open to air 9/8/2019 11:05 AM           Physical Therapy Education     Title: PT OT SLP Therapies (Not Started)     Topic: Physical Therapy (In Progress)     Point: Mobility training (In Progress)     Learning Progress Summary           Patient Acceptance, E, NR by  at 9/9/2019  8:24 AM    Acceptance, E, NR by SK at 9/7/2019  4:32 AM    Acceptance, E,TB, NR,DU by KELBY at 9/4/2019  9:55 AM                   Point: Home exercise program (In Progress)     Learning Progress Summary           Patient Acceptance, E, NR by  at 9/9/2019  8:24 AM    Acceptance, E, NR by SK at 9/7/2019  4:32 AM                   Point: Body mechanics (In Progress)     Learning Progress Summary           Patient Acceptance, E, NR by SK at 9/7/2019  4:32 AM    Acceptance, E, NR by YAKOV at 8/29/2019  7:32 PM                   Point: Precautions (In Progress)     Learning Progress Summary           Patient Acceptance, E, NR by SK at 9/7/2019  4:32 AM    Acceptance, E, NR by MD at 9/6/2019  9:56 AM    Acceptance, E, NR by MD at 9/3/2019  9:22 AM    Acceptance, E, NR,NL by MD at 8/30/2019  2:29 PM                               User Key     Initials Effective Dates Name Provider Type Discipline     06/08/18 -  Poppy Rosa, PT Physical Therapist PT     04/03/18 -  Laura Foster, PT Physical Therapist PT    MD 04/03/18 -  Mira Chadwick PT Physical Therapist PT    SK 05/15/17 -  Mary Small, RN Registered Nurse Nurse    YAKOV 02/15/18 -  Danilo Rosa, RN Registered Nurse Nurse                PT Recommendation and Plan     Plan of Care Reviewed With: patient  Outcome Summary: pt agreeable to ambulate w PT this am - assisted OOB to chair on unit for breakfast. will cont to progress mobility as marina and agreeable.   Outcome Measures     Row Name 09/09/19 0800 09/06/19 1300 09/06/19 0900       How much help from  another person do you currently need...    Turning from your back to your side while in flat bed without using bedrails?  4  -LH  --  4  -MD    Moving from lying on back to sitting on the side of a flat bed without bedrails?  4  -LH  --  4  -MD    Moving to and from a bed to a chair (including a wheelchair)?  3  -LH  --  3  -MD    Standing up from a chair using your arms (e.g., wheelchair, bedside chair)?  3  -LH  --  3  -MD    Climbing 3-5 steps with a railing?  3  -LH  --  3  -MD    To walk in hospital room?  3  -LH  --  3  -MD    AM-PAC 6 Clicks Score (PT)  20  -  --  20  -MD       How much help from another is currently needed...    Putting on and taking off regular lower body clothing?  --  2  -SG  --    Bathing (including washing, rinsing, and drying)  --  2  -SG  --    Toileting (which includes using toilet bed pan or urinal)  --  2  -SG  --    Putting on and taking off regular upper body clothing  --  3  -SG  --    Taking care of personal grooming (such as brushing teeth)  --  3  -SG  --    Eating meals  --  3  -SG  --    AM-PAC 6 Clicks Score (OT)  --  15  -SG  --       Functional Assessment    Outcome Measure Options  AM-PAC 6 Clicks Basic Mobility (PT)  -  AM-PAC 6 Clicks Daily Activity (OT)  -  AM-PAC 6 Clicks Basic Mobility (PT)  -MD      User Key  (r) = Recorded By, (t) = Taken By, (c) = Cosigned By    Initials Name Provider Type     Evie Fernando, OTR Occupational Therapist     Laura Foster, PT Physical Therapist    Mira Vaca, PT Physical Therapist         Time Calculation:   PT Charges     Row Name 09/09/19 0826             Time Calculation    Start Time  0810  -      Stop Time  0820  -      Time Calculation (min)  10 min  -      PT Received On  09/09/19  -      PT - Next Appointment  09/11/19  -        User Key  (r) = Recorded By, (t) = Taken By, (c) = Cosigned By    Initials Name Provider Type     Laura Foster, PT Physical Therapist        Therapy Charges for Today      Code Description Service Date Service Provider Modifiers Qty    43223209809 HC PT THER PROC EA 15 MIN 9/9/2019 Laura Foster, PT GP 1          PT G-Codes  Outcome Measure Options: AM-PAC 6 Clicks Basic Mobility (PT)  AM-PAC 6 Clicks Score (PT): 20  AM-PAC 6 Clicks Score (OT): 15    Laura Foster, PT  9/9/2019

## 2019-09-09 NOTE — THERAPY TREATMENT NOTE
Acute Care - Occupational Therapy Treatment Note  [facility]     Patient Name: Draby Workman  : 1945  MRN: [id number]  Today's Date: 2019             Admit Date: 2019     No diagnosis found.  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/HCC)    • Acute ischemic stroke (CMS/HCC)    • Arthritis    • Coronary artery disease    • CVA (cerebral vascular accident) (CMS/HCC)    • Diabetes mellitus (CMS/HCC)    • Heart attack (CMS/HCC)    • History of blood clots    • Hyperlipidemia    • Hypertension    • Vision loss      Past Surgical History:   Procedure Laterality Date   • CAROTID ENDARTERECTOMY Left 2019    Procedure: Left carotid endarterectomy;  Surgeon: Rupert Oliva MD;  Location: [facility];  Service: Neurosurgery   • EMBOLECTOMY Left 2019    Procedure: CEREBRAL ANGIOGRAM, LEFT INTERNAL CAROTID ARTERY STENT;  Surgeon: Rupert Oliva MD;  Location: [facility] ;  Service: Neurosurgery       Therapy Treatment    Rehabilitation Treatment Summary     Row Name 19 1221 19 0822          Treatment Time/Intention    Discipline  occupational therapist  -SG  physical therapist  -     Document Type  therapy note (daily note)  -SG  therapy note (daily note)  -LH     Subjective Information  no complaints  -SG  no complaints  -     Mode of Treatment  individual therapy;occupational therapy  -SG  individual therapy;physical therapy  -LH     Patient/Family Observations  Pt sitting up  in chair  -SG  pt supine in bed no distress, appears cold., sweater provided  -LH     Patient Effort  good  -SG  good  -LH     Recorded by [SG] Evie Fernando OTR 09/09/19 1223 [LH] Laura Foster, PT 09/09/19 0824     Row Name 09/09/19 1221 09/09/19 0822          Cognitive Assessment/Intervention- PT/OT    Affect/Mental Status (Cognitive)  confused  -SG  confused  -LH     Orientation Status (Cognition)  oriented to;person  -SG  oriented to;person  -LH     Follows Commands (Cognition)  follows one step commands;75-90% accuracy;repetition of directions required;verbal cues/prompting required  -SG  follows one step commands;75-90% accuracy;repetition of directions required;verbal cues/prompting required  -     Personal Safety Interventions  --  fall prevention program maintained;gait belt;supervised activity;nonskid shoes/slippers when out of bed  -LH     Recorded by [SG] Evie Fernando OTR 09/09/19 1223 [LH] Laura Foster, PT 09/09/19 0824     Row Name 09/09/19 0822             Bed Mobility Assessment/Treatment    Supine-Sit [place] (Bed Mobility)  supervision  -      Sit-Supine [place] (Bed Mobility)  not tested  -      Recorded by [LH] Laura Foster, PT 09/09/19 0824      Row Name 09/09/19 1221             Functional Mobility    Functional Mobility- Ind. Level  standby assist  -SG      Functional Mobility- Comment  Walks from common area to room  -SG      Recorded by [SG] Evie Fernando OTR 09/09/19 1223      Row Name 09/09/19 1221             Transfer Assessment/Treatment    Transfer Assessment/Treatment  sit-stand transfer;stand-sit transfer  -SG      Recorded by [SG] Evie Fernando OTR 09/09/19 1223      Row Name 09/09/19 1221 09/09/19 0822          Sit-Stand Transfer    Sit-Stand [place] (Transfers)  stand by assist  -SG  stand by assist  -     Recorded by [SG] Evie Fernando OTR 09/09/19 1223 [LH] Laura Foster, PT 09/09/19 0824     Row Name 09/09/19 1221 09/09/19 0822           Stand-Sit Transfer    Stand-Sit [place] (Transfers)  stand by assist  -SG  stand by assist  -     Recorded by [SG] Evie Fernando, OTR 09/09/19 1223 [LH] Laura Foster, PT 09/09/19 0824     Row Name 09/09/19 0822             Gait/Stairs Assessment/Training    [place] Level (Gait)  contact guard  -LH      Distance in Feet (Gait)  100  -LH      Pattern (Gait)  step-through  -LH      Deviations/Abnormal Patterns (Gait)  festinating/shuffling;eliza decreased  -LH      Bilateral Gait Deviations  forward flexed posture;heel strike decreased  -LH      Comment (Gait/Stairs)  ambulated from room to chair on unit for breakfast  -LH      Recorded by [LH] Laura Foster, PT 09/09/19 0824      Row Name 09/09/19 1221             Grooming Assessment/Training    [place] Level (Grooming)  grooming skills;oral care regimen;hair care, combing/brushing;minimum assist (75% patient effort);nonverbal cues (demo/gesture);verbal cues  -SG      Grooming Position  sink side;supported standing  -SG      Comment (Grooming)  Less vcing needed for sequencing and less [place] also  -SG      Recorded by [SG] Evie Fernando, OTR 09/09/19 1223      Row Name 09/09/19 1221 09/09/19 0822          Positioning and Restraints    Pre-Treatment Position  sitting in chair/recliner  -SG  in bed  -LH     Post Treatment Position  chair  -SG  chair  -LH     In Chair  sitting;patient within staff view  -SG  sitting;waffle cushion;patient within staff view  -LH     Recorded by [SG] Evie Fernando, OTR 09/09/19 1223 [LH] Laura Foster, PT 09/09/19 0824     Row Name 09/09/19 1221 09/09/19 0822          Pain Scale: Numbers Pre/Post-Treatment    Pain Scale: Numbers, Pretreatment  0/10 - no pain  -SG  0/10 - no pain  -     Pain Scale: Numbers, Post-Treatment  --  0/10 - no pain  -     Recorded by [SG] Evie Fernando, OTR 09/09/19 1223 [LH] Laura Foster, PT 09/09/19 0824     Row Name                Wound 07/17/19 1015 Left neck incision     Wound - Properties Group Date first assessed: 07/17/19 [LD] Time first assessed: 1015 [LD] Side: Left [LD] Location: neck [LD] Type: incision [LD] Recorded by:  [LD] Martha Foster RN 07/17/19 1015      User Key  (r) = Recorded By, (t) = Taken By, (c) = Cosigned By    Initials Name Effective Dates Discipline    SG Evie Fernando OTR 12/26/18 -  OT    LH Laura Foster, PT 04/03/18 -  PT    LD Martha Foster RN 06/16/16 -  Nurse        Wound 07/17/19 1015 Left neck incision (Active)   Dressing Appearance open to air 9/9/2019 12:39 AM   Base dry 9/9/2019 12:39 AM       Occupational Therapy Education     Title: PT OT SLP Therapies (Not Started)     Topic: Occupational Therapy (In Progress)     Point: ADL training (In Progress)     Description: Instruct learner(s) on proper safety adaptation and remediation techniques during self care or transfers.   Instruct in proper use of assistive devices.    Learning Progress Summary           Patient Acceptance, E, NR by SK at 9/7/2019  4:32 AM    DENNY Angeles NL by DOROTHY at 8/30/2019 11:56 AM    Comment:  OT educ on OT role in therapeutic process and pt's POC.                   Point: Home exercise program (In Progress)     Description: Instruct learner(s) on appropriate technique for monitoring, assisting and/or progressing therapeutic exercises/activities.    Learning Progress Summary           Patient Acceptance, E, NR by SK at 9/7/2019  4:32 AM                   Point: Precautions (In Progress)     Description: Instruct learner(s) on prescribed precautions during self-care and functional transfers.    Learning Progress Summary           Patient Acceptance, E, NR by SK at 9/7/2019  4:32 AM                   Point: Body mechanics (In Progress)     Description: Instruct learner(s) on proper positioning and spine alignment during self-care, functional mobility activities and/or exercises.    Learning Progress Summary           Patient Acceptance, E, NR by SK at 9/7/2019  4:32  AM                               User Key     Initials Effective Dates Name Provider Type Discipline    SK 05/15/17 -  Mary Small, RN Registered Nurse Nurse    RP 05/03/18 -  Gabriela Brown, OT Occupational Therapist OT                OT Recommendation and Plan     Plan of Care Review  Plan of Care Reviewed With: patient  Plan of Care Reviewed With: patient  Outcome Summary: Pt improving with simple ADLs, much less vcing and hand over hand needed for grooming tasks at sink level.  Outcome Measures     Row Name 09/09/19 1200 09/09/19 0800 09/06/19 1300       How much help from another person do you currently need...    Turning from your back to your side while in flat bed without using bedrails?  --  4  -LH  --    Moving from lying on back to sitting on the side of a flat bed without bedrails?  --  4  -LH  --    Moving to and from a bed to a chair (including a wheelchair)?  --  3  -LH  --    Standing up from a chair using your arms (e.g., wheelchair, bedside chair)?  --  3  -LH  --    Climbing 3-5 steps with a railing?  --  3  -LH  --    To walk in hospital room?  --  3  -LH  --    AM-PAC 6 Clicks Score (PT)  --  20  -LH  --       How much help from another is currently needed...    Putting on and taking off regular lower body clothing?  3  -SG  --  2  -SG    Bathing (including washing, rinsing, and drying)  3  -SG  --  2  -SG    Toileting (which includes using toilet bed pan or urinal)  3  -SG  --  2  -SG    Putting on and taking off regular upper body clothing  3  -SG  --  3  -SG    Taking care of personal grooming (such as brushing teeth)  3  -SG  --  3  -SG    Eating meals  3  -SG  --  3  -SG    AM-PAC 6 Clicks Score (OT)  18  -SG  --  15  -SG       Functional Assessment    Outcome Measure Options  AM-PAC 6 Clicks Daily Activity (OT)  -SG  AM-PAC 6 Clicks Basic Mobility (PT)  -LH  AM-PAC 6 Clicks Daily Activity (OT)  -SG      User Key  (r) = Recorded By, (t) = Taken By, (c) = Cosigned By    Initials Name  Provider Type    Evie Bae OTR Occupational Therapist     Laura Foster, PT Physical Therapist           Time Calculation:   Time Calculation- OT     Row Name 09/09/19 1224             Time Calculation- OT    OT Start Time  1000  -SG      OT Stop Time  1015  -      OT Time Calculation (min)  15 min  -      OT Received On  09/09/19  -        User Key  (r) = Recorded By, (t) = Taken By, (c) = Cosigned By    Initials Name Provider Type    Evie Bae OTR Occupational Therapist        Therapy Charges for Today     Code Description Service Date Service Provider Modifiers Qty    84928965777 HC OT SELF CARE/MGMT/TRAIN EA 15 MIN 9/9/2019 Evie Fernando OTR GO 1               JOHN Walton  9/9/2019

## 2019-09-09 NOTE — PLAN OF CARE
Problem: Patient Care Overview  Goal: Plan of Care Review   09/09/19 1224   Plan of Care Review   Progress improving   OTHER   Outcome Summary Pt improving with simple ADLs, much less vcing and hand over hand needed for grooming tasks at sink level.   Coping/Psychosocial   Plan of Care Reviewed With patient

## 2019-09-09 NOTE — PLAN OF CARE
Problem: Patient Care Overview  Goal: Plan of Care Review  Outcome: Ongoing (interventions implemented as appropriate)   09/09/19 0039 09/09/19 0444   Plan of Care Review   Progress --  improving   OTHER   Outcome Summary --  Pt denies anxiety, depression, SI, HI,hallucinaitons and pain. Cooperative with meds crushed in pudding. Will continue to monitor.   Coping/Psychosocial   Plan of Care Reviewed With patient --    Coping/Psychosocial   Patient Agreement with Plan of Care agrees --        Problem: Overarching Goals (Adult)  Goal: Adheres to Safety Considerations for Self and Others  Outcome: Ongoing (interventions implemented as appropriate)    Goal: Optimized Coping Skills in Response to Life Stressors  Outcome: Ongoing (interventions implemented as appropriate)    Goal: Develops/Participates in Therapeutic [place] to Support Successful Transition  Outcome: Ongoing (interventions implemented as appropriate)      Problem: Decreased Participation/Engagement (Depressive Signs/Symptoms) (Adult)  Goal: Increased Participation/Engagement (Depressive Signs/Symptoms)  Outcome: Ongoing (interventions implemented as appropriate)      Problem: Social/Occupational/Functional Impairment (Depressive Signs/Symptoms) (Adult)  Goal: Improved Social/Occupational/Functional Skills (Depressive Signs/Symptoms)  Outcome: Ongoing (interventions implemented as appropriate)      Problem: Psychomotor Impairment (Depressive Signs/Symptoms) (Adult)  Goal: Improved Psychomotor Symptoms (Depressive Signs/Symptoms)  Outcome: Ongoing (interventions implemented as appropriate)      Problem: Cognitive Impairment (Dementia Signs/Symptoms) (Adult)  Goal: Optimized Cognitive Function (Dementia Signs/Symptoms)  Outcome: Ongoing (interventions implemented as appropriate)      Problem: Behavioral Impairment (Dementia Signs/Symptoms) (Adult)  Goal: Improved Behavioral Control (Dementia Signs/Symptoms)  Outcome: Ongoing (interventions implemented as  appropriate)      Problem: Psychological Impairment (Dementia Signs/Symptoms) (Adult)  Goal: Improved Psychological Symptoms (Dementia Signs/Symptoms)  Outcome: Ongoing (interventions implemented as appropriate)      Problem: Skin Injury Risk (Adult)  Goal: Skin Health and Integrity  Outcome: Ongoing (interventions implemented as appropriate)      Problem: Fall Risk (Adult)  Goal: Absence of Fall  Outcome: Ongoing (interventions implemented as appropriate)

## 2019-09-09 NOTE — PLAN OF CARE
Problem: Patient Care Overview  Goal: Plan of Care Review   09/09/19 5742   OTHER   Outcome Summary pt agreeable to ambulate w PT this am - assisted OOB to chair on unit for breakfast. will cont to progress mobility as marina and agreeable.    Coping/Psychosocial   Plan of Care Reviewed With patient

## 2019-09-10 LAB
GLUCOSE BLDC GLUCOMTR-MCNC: 110 MG/DL (ref 70–130)
GLUCOSE BLDC GLUCOMTR-MCNC: 130 MG/DL (ref 70–130)
GLUCOSE BLDC GLUCOMTR-MCNC: 138 MG/DL (ref 70–130)
GLUCOSE BLDC GLUCOMTR-MCNC: 155 MG/DL (ref 70–130)

## 2019-09-10 PROCEDURE — 97110 THERAPEUTIC EXERCISES: CPT

## 2019-09-10 PROCEDURE — 63710000001 INSULIN LISPRO (HUMAN) PER 5 UNITS: Performed by: PHYSICAL MEDICINE & REHABILITATION

## 2019-09-10 PROCEDURE — 82962 GLUCOSE BLOOD TEST: CPT

## 2019-09-10 PROCEDURE — 92523 SPEECH SOUND LANG COMPREHEN: CPT

## 2019-09-10 PROCEDURE — 97535 SELF CARE MNGMENT TRAINING: CPT | Performed by: OCCUPATIONAL THERAPIST

## 2019-09-10 RX ADMIN — PANTOPRAZOLE SODIUM 40 MG: 40 TABLET, DELAYED RELEASE ORAL at 18:16

## 2019-09-10 RX ADMIN — INSULIN LISPRO 2 UNITS: 100 INJECTION, SOLUTION INTRAVENOUS; SUBCUTANEOUS at 18:15

## 2019-09-10 RX ADMIN — GABAPENTIN 100 MG: 100 CAPSULE ORAL at 21:17

## 2019-09-10 RX ADMIN — APIXABAN 5 MG: 5 TABLET, FILM COATED ORAL at 09:12

## 2019-09-10 RX ADMIN — OLANZAPINE 2.5 MG: 2.5 TABLET, FILM COATED ORAL at 09:13

## 2019-09-10 RX ADMIN — METFORMIN HYDROCHLORIDE 1000 MG: 1000 TABLET ORAL at 09:11

## 2019-09-10 RX ADMIN — LORAZEPAM 0.5 MG: 0.5 TABLET ORAL at 21:17

## 2019-09-10 RX ADMIN — NEBIVOLOL HYDROCHLORIDE 20 MG: 10 TABLET ORAL at 09:12

## 2019-09-10 RX ADMIN — DEXTROMETHORPHAN HYDROBROMIDE AND QUINIDINE SULFATE 1 CAPSULE: 20; 10 CAPSULE, GELATIN COATED ORAL at 09:13

## 2019-09-10 RX ADMIN — APIXABAN 5 MG: 5 TABLET, FILM COATED ORAL at 21:14

## 2019-09-10 RX ADMIN — DEXTROMETHORPHAN HYDROBROMIDE AND QUINIDINE SULFATE 1 CAPSULE: 20; 10 CAPSULE, GELATIN COATED ORAL at 21:14

## 2019-09-10 RX ADMIN — HYDRALAZINE HYDROCHLORIDE 25 MG: 25 TABLET ORAL at 16:07

## 2019-09-10 RX ADMIN — METFORMIN HYDROCHLORIDE 1000 MG: 1000 TABLET ORAL at 18:16

## 2019-09-10 RX ADMIN — BRIMONIDINE TARTRATE 1 DROP: 2 SOLUTION OPHTHALMIC at 09:12

## 2019-09-10 RX ADMIN — POTASSIUM CHLORIDE 20 MEQ: 1.5 POWDER, FOR SOLUTION ORAL at 09:11

## 2019-09-10 RX ADMIN — HYDRALAZINE HYDROCHLORIDE 25 MG: 25 TABLET ORAL at 23:20

## 2019-09-10 RX ADMIN — HYDRALAZINE HYDROCHLORIDE 25 MG: 25 TABLET ORAL at 09:11

## 2019-09-10 RX ADMIN — PAROXETINE HYDROCHLORIDE 10 MG: 10 TABLET, FILM COATED ORAL at 09:12

## 2019-09-10 RX ADMIN — DORZOLAMIDE HYDROCHLORIDE 1 DROP: 20 SOLUTION/ DROPS OPHTHALMIC at 21:15

## 2019-09-10 RX ADMIN — CLOPIDOGREL 75 MG: 75 TABLET, FILM COATED ORAL at 09:12

## 2019-09-10 RX ADMIN — LOSARTAN POTASSIUM 100 MG: 100 TABLET, FILM COATED ORAL at 18:16

## 2019-09-10 RX ADMIN — OLANZAPINE 2.5 MG: 2.5 TABLET, FILM COATED ORAL at 21:14

## 2019-09-10 RX ADMIN — AMLODIPINE BESYLATE 5 MG: 5 TABLET ORAL at 09:13

## 2019-09-10 RX ADMIN — Medication 3 MG: at 21:15

## 2019-09-10 RX ADMIN — GABAPENTIN 100 MG: 100 CAPSULE ORAL at 09:13

## 2019-09-10 RX ADMIN — BRIMONIDINE TARTRATE 1 DROP: 2 SOLUTION OPHTHALMIC at 21:15

## 2019-09-10 RX ADMIN — PANTOPRAZOLE SODIUM 40 MG: 40 TABLET, DELAYED RELEASE ORAL at 09:11

## 2019-09-10 RX ADMIN — LORAZEPAM 0.5 MG: 0.5 TABLET ORAL at 09:13

## 2019-09-10 RX ADMIN — DORZOLAMIDE HYDROCHLORIDE 1 DROP: 20 SOLUTION/ DROPS OPHTHALMIC at 09:12

## 2019-09-10 RX ADMIN — ASPIRIN 325 MG: 325 TABLET ORAL at 09:12

## 2019-09-10 RX ADMIN — ATORVASTATIN CALCIUM 80 MG: 80 TABLET, FILM COATED ORAL at 21:14

## 2019-09-10 NOTE — PLAN OF CARE
Problem: Patient Care Overview  Goal: Plan of Care Review  Outcome: Ongoing (interventions implemented as appropriate)   09/10/19 1132   OTHER   Outcome Summary Pt required a little more assist for walking today   Coping/Psychosocial   Plan of Care Reviewed With patient

## 2019-09-10 NOTE — PLAN OF CARE
Problem: Patient Care Overview  Goal: Plan of Care Review  Outcome: Ongoing (interventions implemented as appropriate)   09/10/19 0874   OTHER   Outcome Summary Speech and language evaluation completed. Pt presents with moderate-severe non-fluent aphasia and suspected apraxia, however, much improved since previous evaluation. Recommend SLP services targeting language deficits.    Coping/Psychosocial   Plan of Care Reviewed With patient

## 2019-09-10 NOTE — THERAPY TREATMENT NOTE
Patient Name: Darby Workman  : 1945    MRN: [id number]                              Today's Date: 9/10/2019       Admit Date: 2019    Visit Dx: No diagnosis found.  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/HCC)    • Acute ischemic stroke (CMS/HCC)    • Arthritis    • Coronary artery disease    • CVA (cerebral vascular accident) (CMS/HCC)    • Diabetes mellitus (CMS/HCC)    • Heart attack (CMS/HCC)    • History of blood clots    • Hyperlipidemia    • Hypertension    • Vision loss      Past Surgical History:   Procedure Laterality Date   • CAROTID ENDARTERECTOMY Left 2019    Procedure: Left carotid endarterectomy;  Surgeon: Rupert Oliva MD;  Location: [facility];  Service: Neurosurgery   • EMBOLECTOMY Left 2019    Procedure: CEREBRAL ANGIOGRAM, LEFT INTERNAL CAROTID ARTERY STENT;  Surgeon: Rupert Oliva MD;  Location: [facility] ;  Service: Neurosurgery     General Information     Row Name 09/10/19 1125          PT Evaluation Time/Intention    Document Type  therapy note (daily note)  -LB     Mode of Treatment  physical therapy  -LB       User Key  (r) = Recorded By, (t) = Taken By, (c) = Cosigned By    Initials Name Provider Type    LB Evelina Plaza, PT Physical Therapist        Mobility     Row Name 09/10/19 1126          Sit-Stand Transfer    Sit-Stand [place] (Transfers)  contact guard  -LB     Row Name 09/10/19 1126          Gait/Stairs Assessment/Training    [place]  Level (Gait)  contact guard;minimum assist (75% patient effort)  -LB     Assistive Device (Gait)  -- HHA  -LB     Distance in Feet (Gait)  75  -LB     Pattern (Gait)  step-to  -LB     Deviations/Abnormal Patterns (Gait)  festinating/shuffling;eliza decreased;right sided deviations;stride length decreased  -LB     Right Sided Gait Deviations  weight shift ability decreased;heel strike decreased  -LB     Comment (Gait/Stairs)  Pt with decreased step length on right today  -LB       User Key  (r) = Recorded By, (t) = Taken By, (c) = Cosigned By    Initials Name Provider Type    LB Evelina Plaza, PT Physical Therapist        Obj/Interventions    No documentation.       Goals/Plan     Row Name 09/10/19 1130          Transfer Goal 1 (PT)    Activity/Assistive Device (Transfer Goal 1, PT)  sit-to-stand/stand-to-sit  -LB     [place] Level/Cues Needed (Transfer Goal 1, PT)  supervision required  -LB     Time Frame (Transfer Goal 1, PT)  1 week  -LB     Progress/Outcome (Transfer Goal 1, PT)  progress slower than expected  -LB     Row Name 09/10/19 1130          Gait Training Goal 1 (PT)    Activity/Assistive Device (Gait Training Goal 1, PT)  gait (walking locomotion)  -LB     [place] Level (Gait Training Goal 1, PT)  supervision required  -LB     Distance (Gait Goal 1, PT)  100  -LB     Time Frame (Gait Training Goal 1, PT)  1 week  -LB     Progress/Outcome (Gait Training Goal 1, PT)  progress slower than expected  -LB       User Key  (r) = Recorded By, (t) = Taken By, (c) = Cosigned By    Initials Name Provider Type    LB Evelina Plaza PT Physical Therapist        Clinical Impression     Row Name 09/10/19 1128          Pain Scale: Numbers Pre/Post-Treatment    Pain Scale: Numbers, Pretreatment  0/10 - no pain  -LB     Pain Scale: Numbers, Post-Treatment  0/10 - no pain  -LB     Row Name 09/10/19 1128          Positioning and Restraints    Pre-Treatment Position  sitting in chair/recliner  -LB     Post  Treatment Position  chair  -LB     In Chair  patient within staff view  -LB       User Key  (r) = Recorded By, (t) = Taken By, (c) = Cosigned By    Initials Name Provider Type    Evelina Galvez PT Physical Therapist        Outcome Measures     Row Name 09/10/19 1132          How much help from another person do you currently need...    Turning from your back to your side while in flat bed without using bedrails?  4  -LB     Moving from lying on back to sitting on the side of a flat bed without bedrails?  4  -LB     Moving to and from a bed to a chair (including a wheelchair)?  3  -LB     Standing up from a chair using your arms (e.g., wheelchair, bedside chair)?  3  -LB     Climbing 3-5 steps with a railing?  3  -LB     To walk in hospital room?  3  -LB     AM-PAC 6 Clicks Score (PT)  20  -LB       User Key  (r) = Recorded By, (t) = Taken By, (c) = Cosigned By    Initials Name Provider Type    Evelina Galvez PT Physical Therapist        Physical Therapy Education     Title: PT OT SLP Therapies (Not Started)     Topic: Physical Therapy (In Progress)     Point: Mobility training (In Progress)     Learning Progress Summary           Patient Acceptance, E,TB, NR by GINNY at 9/10/2019 11:31 AM    Acceptance, E, NR by GREGORY at 9/9/2019  8:24 AM    Acceptance, E, NR by SK at 9/7/2019  4:32 AM    Acceptance, E,TB, NR,DU by KELBY at 9/4/2019  9:55 AM                   Point: Home exercise program (In Progress)     Learning Progress Summary           Patient Acceptance, E, NR by GREGORY at 9/9/2019  8:24 AM    Acceptance, E, NR by SK at 9/7/2019  4:32 AM                   Point: Body mechanics (In Progress)     Learning Progress Summary           Patient Acceptance, E, NR by SK at 9/7/2019  4:32 AM    Acceptance, E, NR by YAKOV at 8/29/2019  7:32 PM                   Point: Precautions (In Progress)     Learning Progress Summary           Patient Acceptance, E, NR by SK at 9/7/2019  4:32 AM    Acceptance, E, NR by MD at 9/6/2019  9:56  AM    Acceptance, E, NR by MD at 9/3/2019  9:22 AM    Acceptance, E, NR,NL by MD at 8/30/2019  2:29 PM                               User Key     Initials Effective Dates Name Provider Type Discipline    KELBY 06/08/18 -  Poppy Rosa, PT Physical Therapist PT    LB 04/03/18 -  Evelina Plaza, PT Physical Therapist PT     04/03/18 -  Laura Foster, PT Physical Therapist PT    MD 04/03/18 -  Mira Chadwick, PT Physical Therapist PT    SK 05/15/17 -  Mary Small RN Registered Nurse Nurse    YAKOV 02/15/18 -  Danilo Rosa RN Registered Nurse Nurse              PT Recommendation and Plan     Plan of Care Reviewed With: patient  Outcome Summary: Pt required a little more assist for walking today     Time Calculation:   PT Charges     Row Name 09/10/19 1132             Time Calculation    Start Time  1019  -LB      Stop Time  1028  -LB      Time Calculation (min)  9 min  -LB      PT Received On  09/10/19  -LB      PT - Next Appointment  09/12/19  -LB      PT Goal Re-Cert Due Date  09/17/19  -LB        User Key  (r) = Recorded By, (t) = Taken By, (c) = Cosigned By    Initials Name Provider Type    LB Evelina Plaza, PT Physical Therapist        Therapy Charges for Today     Code Description Service Date Service Provider Modifiers Qty    24707927794 HC PT THER PROC EA 15 MIN 9/10/2019 Evelina Plaza, PT GP 1          PT G-Codes  Outcome Measure Options: AM-PAC 6 Clicks Daily Activity (OT)  AM-PAC 6 Clicks Score (PT): 20  AM-PAC 6 Clicks Score (OT): 18    Evelina Plaza PT  9/10/2019

## 2019-09-10 NOTE — THERAPY EVALUATION
Acute Care - Speech Language Pathology Initial Evaluation  [facility]     Patient Name: Darby Workman  : 1945  MRN: [id number]  Today's Date: 9/10/2019               Admit Date: 2019     Visit Dx:  No diagnosis found.  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/HCC)    • Acute ischemic stroke (CMS/HCC)    • Arthritis    • Coronary artery disease    • CVA (cerebral vascular accident) (CMS/HCC)    • Diabetes mellitus (CMS/HCC)    • Heart attack (CMS/HCC)    • History of blood clots    • Hyperlipidemia    • Hypertension    • Vision loss      Past Surgical History:   Procedure Laterality Date   • CAROTID ENDARTERECTOMY Left 2019    Procedure: Left carotid endarterectomy;  Surgeon: Rupert Oliva MD;  Location: [facility];  Service: Neurosurgery   • EMBOLECTOMY Left 2019    Procedure: CEREBRAL ANGIOGRAM, LEFT INTERNAL CAROTID ARTERY STENT;  Surgeon: Rupert Oliva MD;  Location: [facility] ;  Service: Neurosurgery        SLP EVALUATION (last 72 hours)      SLP SLC Evaluation     Row Name 09/10/19 1501          Document Type  evaluation  -ML    Subjective Information  no complaints  -ML    Patient Observations  alert;cooperative  -ML    Patient/Family Observations  Pt's  present on the floor, but did not participate in evaluation.   -ML    Patient Effort  good  -ML    Symptoms Noted During/After Treatment  none  -ML          Patient Profile Reviewed  yes  -ML    Pertinent History Of  Current Problem  s/p L CVA, transferred from rehab to [facility] due to agressive behaviors. Calm and cooperative this date.   -ML    Barriers to Rehab  medically complex  -ML    Patient's Goals for Discharge  patient could not state  -ML          Additional Documentation  -- Pt unable to report but did not appear in pain  -ML          Comprehension Assessment/Intervention  Auditory Comprehension;Reading Comprehension  -ML          Auditory Comprehension (Communication)  moderate impairment;severe impairment  -ML    Able to Identify Objects/Pictures (Communication)  body part;familiar objects;mild impairment simple, field of 2  -ML    Answers Questions (Communication)  simple;yes/no;mild impairment;moderate impairment;wh questions;personal;abstract;severe impairment  -ML    Able to Follow Commands (Communication)  1-step;mild impairment;2-step;moderate impairment;severe impairment  -ML    Successful Auditory Strategies (Communication)  visual cues;repetition  -ML          Reading Comprehension (Communication)  moderate impairment;severe impairment able to read 1/3 single words  -ML    Single Word Level  moderate impairment;severe impairment 1/3  -ML    Phrase Level  severe impairment  -ML          Expression Assessment/Intervention  verbal expression;graphic expression  -ML          Verbal Expression  severe impairment  -ML    Automatic Speech (Communication)  counting 1-20;days of week;sing happy birthday;moderate impairment 70%- counting 1-10, 30%- days of week, 100% HB with SLP  -ML    Repetition  sounds;WFL;words;mild impairment;moderate impairment  -ML    Confrontational Naming  moderate impairment;severe impairment able to repeat item  -ML    Spontaneous/Functional Words  severe impairment  -ML          Graphic Expression  severe impairment  -ML    Biographical Information  severe impairment  -ML          Oral Motor Structure and Function  mild impairment noted right labial droop  -ML    Dentition Assessment   "natural, present and adequate  -ML    Mucosal Quality  moist, healthy  -ML          Motor Speech Function  moderate impairment;severe impairment  -ML    Characteristics Consistent with Dysarthria  decreased intensity;decreased articulation  -ML    Characteristics Consistent with Apraxia  substitutions;distortions;vowel distortion;inconsistent errors  -ML          Voice, Comment  low intensity  -ML          Cognitive Function (Cognition)  -- unable to adequately assess due to language deficits  -ML          SLP Diagnosis  Pt presents with moderate-severe non-fluent aphasia s/p recent L CVA characterized by impaired audiitory comprehension of simple yes/no and \"wh\" questions and 1-2 step directions and impaired naming, automatics, and repetition with phonemic errors. Pt exhibiting jargon of speech/neologism in conversation, with few comprehensable words within the utterances. Also suspect pt with apraxia indicated by inconsistent errors and distortions. Despite deficits, pt much improved since previous evaluation with ability to ID objects in a feild of 2, follow few 1 step directions, and ability to repeat vowels, CV, and simple words with min cues. Pt would benefit from therapy. Recommend SLP services targeting deficits listed.   -ML    Rehab Potential/Prognosis  good  -ML    SLC Criteria for Skilled Therapy Interventions Met  yes  -ML    Functional Impact  difficulty communicating wants, needs;difficulty communicating in an emergency;functional impact in social situations  -ML          Therapy Frequency (SLP SLC)  PRN  -ML    Predicted Duration Therapy Intervention (Days)  until discharge  -ML    Anticipated Dischage Disposition  anticipate therapy at next level of care  -ML          Auditory Comprehension Treatment Objectives  Auditory Comprehension Treatment Objectives (Group)  -ML    Reading Comprehension Treatment Objectives  Reading Comprehension Treatment Objectives (Group)  -ML    Verbal Expression Treatment " Objectives  Verbal Expression Treatment Objectives (Group)  -ML          Words/Phrases/Sentences Selection  words/phrases/sentences, SLP goal 1  -ML    Comprehend Questions Selection  comprehend questions, SLP goal 1  -ML    Follow Directions Selection  follow directions, SLP goal 1  -ML          Improve Ability to Comprehend Words/Phrases/Sentences Through: Goal 1 (SLP)  identify objects, field of;independently (over 90% accuracy) 4-6  -ML    Time Frame (Identify Objects and Pictures Goal 1, SLP)  by discharge  -ML          Improve Ability to Comprehend Questions Goal 1 (SLP)  questions about personal information;simple yes/no questions;simple general questions;with minimal cues (75-90%);simple wh questions  -ML    Time Frame (Comprehend Questions Goal 1, SLP)  by discharge  -ML          Improve Ability to Follow Directions Goal 1 (SLP)  1 step direction with objects;1 step direction without objects;independently (over 90% accuracy);2 step commands;with minimal cues (75-90%)  -ML    Time Frame (Follow Directions Goal 1, SLP)  by discharge  -ML          Reading Comprehension at Word Level Selection  reading comprehension at word level, SLP goal 1  -ML          Improve Reading Comprehension at Word Level Goal 1 (SLP)  match object to word;with minimal cues (75-90%);matching picture to word  -ML    Time Frame (Reading Comprehension at Word Level Goal 1, SLP)  by discharge  -ML          Word Retrieval Skills Selection  word retrieval, SLP goal 1  -ML          Improve Word Retrieval Skills By Goal 1 (SLP)  completing automatic speech task, counting;completing automatic speech task, alphabet;completing automatic speech task, days of the week;completing automatic speech task, sing “Happy Birthday”;confrontational naming task;simple;repeating words;with minimal cues (75-90%)  -ML    Time Frame (Word Retrieval Goal 1, SLP)  by discharge  -ML          Improve Graphic Expression of Shapes, Letters, and Numbers Goal 1 (SLP)   "copy shapes, numbers, and letters;with moderate cues (50-74%)  -ML    Time Frame (Graphic Expression of Shapes, Letters, and Numbers Goal 1, SLP)  by discharge  -ML          Improve Planning and Execution of Connected Speech by Goal 1 (SLP)  imitating consonant-vowel combos;imitating one syllable words;imitating two syllable words;with minimal cues (75-90%)  -ML    Time Frame (Planning and Execution of Connected Speech Goal 1, SLP)  by discharge  -ML      User Key  (r) = Recorded By, (t) = Taken By, (c) = Cosigned By    Initials Name Effective Dates    ML Patsy Gregg, MS CCC-SLP 10/04/18 -              EDUCATION  The patient has been educated in the following areas:     Communication Impairment.    SLP Recommendation and Plan  SLP Diagnosis: Pt presents with moderate-severe non-fluent aphasia s/p recent L CVA characterized by impaired audiitory comprehension of simple yes/no and \"wh\" questions and 1-2 step directions and impaired naming, automatics, and repetition with phonemic errors. Pt exhibiting jargon of speech/neologism in conversation, with few comprehensable words within the utterances. Also suspect pt with apraxia indicated by inconsistent errors and distortions. Despite deficits, pt much improved since previous evaluation with ability to ID objects in a feild of 2, follow few 1 step directions, and ability to repeat vowels, CV, and simple words with min cues. Pt would benefit from therapy. Recommend SLP services targeting deficits listed.      SLC Criteria for Skilled Therapy Interventions Met: yes  Anticipated Dischage Disposition: anticipate therapy at next level of care     Predicted Duration Therapy Intervention (Days): until discharge    Plan of Care Reviewed With: patient  Outcome Summary: Speech, language, cognitive evaluation completed. Pt presents with moderate-severe non-fluent aphasia and apraxia, however, much improved since previous evaluation. Recommend SLP services targeting language " deficits.       SLP GOALS     Row Name 09/10/19 1501             Words/Phrases/Sentences Goal 1 (SLP)    Improve Ability to Comprehend Words/Phrases/Sentences Through: Goal 1 (SLP)  identify objects, field of;independently (over 90% accuracy) 4-6  -ML      Time Frame (Identify Objects and Pictures Goal 1, SLP)  by discharge  -ML         Comprehend Questions Goal 1 (SLP)    Improve Ability to Comprehend Questions Goal 1 (SLP)  questions about personal information;simple yes/no questions;simple general questions;with minimal cues (75-90%);simple wh questions  -ML      Time Frame (Comprehend Questions Goal 1, SLP)  by discharge  -ML         Follow Directions Goal 2 (SLP)    Improve Ability to Follow Directions Goal 1 (SLP)  1 step direction with objects;1 step direction without objects;independently (over 90% accuracy);2 step commands;with minimal cues (75-90%)  -ML      Time Frame (Follow Directions Goal 1, SLP)  by discharge  -ML         Reading Comprehension at Word Level Goal 1 (SLP)    Improve Reading Comprehension at Word Level Goal 1 (SLP)  match object to word;with minimal cues (75-90%);matching picture to word  -ML      Time Frame (Reading Comprehension at Word Level Goal 1, SLP)  by discharge  -ML         Word Retrieval Skills Goal 1 (SLP)    Improve Word Retrieval Skills By Goal 1 (SLP)  completing automatic speech task, counting;completing automatic speech task, alphabet;completing automatic speech task, days of the week;completing automatic speech task, sing “Happy Birthday”;confrontational naming task;simple;repeating words;with minimal cues (75-90%)  -ML      Time Frame (Word Retrieval Goal 1, SLP)  by discharge  -ML         Graphic Expression of Shapes, Letters, Numbers Goal 1 (SLP)    Improve Graphic Expression of Shapes, Letters, and Numbers Goal 1 (SLP)  copy shapes, numbers, and letters;with moderate cues (50-74%)  -ML      Time Frame (Graphic Expression of Shapes, Letters, and Numbers Goal 1, SLP)   by discharge  -ML         Planning and Execution of Connected Speech Goal 1 (SLP)    Improve Planning and Execution of Connected Speech by Goal 1 (SLP)  imitating consonant-vowel combos;imitating one syllable words;imitating two syllable words;with minimal cues (75-90%)  -ML      Time Frame (Planning and Execution of Connected Speech Goal 1, SLP)  by discharge  -ML        User Key  (r) = Recorded By, (t) = Taken By, (c) = Cosigned By    Initials Name Provider Type    Patsy Barber MS CCC-SLP Speech and Language Pathologist             SLP Outcome Measures (last 72 hours)      SLP Outcome Measures     Row Name 09/10/19 1600             SLP Outcome Measures    Outcome Measure Used?  Adult NOMS  -ML         Adult FCM Scores    FCM Chosen  Verbal Expression  -ML      Verbal Expression FCM Score  2 merging 3  -ML        User Key  (r) = Recorded By, (t) = Taken By, (c) = Cosigned By    Initials Name Effective Dates    Patsy Barber MS CCC-SLP 10/04/18 -               Time Calculation:     Time Calculation- SLP     Row Name 09/10/19 1605             Time Calculation- SLP    SLP Start Time  1500  -ML      SLP Received On  09/10/19  -ML        User Key  (r) = Recorded By, (t) = Taken By, (c) = Cosigned By    Initials Name Provider Type    Patsy Barber MS CCC-SLP Speech and Language Pathologist          Therapy Charges for Today     Code Description Service Date Service Provider Modifiers Qty    [id number]  ST EVAL SPEECH AND PROD W LANG  4 9/10/2019 Patsy Gregg MS CCC-SLP GN 1             ADULT NOMS (last 72 hours)      Adult NOMS     Row Name 09/10/19 1600                   Adult FCM Scores    FCM Chosen  Verbal Expression  -ML        Verbal Expression FCM Score  2 merging 3  -ML          User Key  (r) = Recorded By, (t) = Taken By, (c) = Cosigned By    Initials Name Effective Dates    Patsy Barber MS CCC-SLP 10/04/18 -                  Patsy Gregg MS  CCC-SLP  9/10/2019

## 2019-09-10 NOTE — PROGRESS NOTES
Continued Stay Note  [facility]     Patient Name: Darby Workman  MRN: [id number]  Today's Date: 9/10/2019    Admit Date: 8/29/2019    Discharge Plan     Row Name 09/10/19 1347       Plan    Plan  Referrals pending for CHAR vs Home with HH    Plan Comments  Additonal CHAR referrals made to [facility],  [facility],  [place], [facility] and [facility].  All referrals placed in EPIC and also called to liaisons.     Row Name 09/10/19 4684       Plan    Plan Comments  SW called and spoke w/pt's daughter, Silva to discuss d/c plans.  Pt's daughter stated that the family is considering taking pt home with home health but the details have not been worked out yet.  Pt's daughter stated that rehab would be the best option at this time. Pt's daughter stated that the family are exploring options and she would contact SW tomorrow w/more info.        Discharge Codes    No documentation.             Aixa Moser MSW

## 2019-09-10 NOTE — PLAN OF CARE
Problem: Patient Care Overview  Goal: Plan of Care Review   09/10/19 1212   Plan of Care Review   Progress improving   OTHER   Outcome Summary Tolerating ADLs well this am with OT, improving with independence, requires mod A for UBD/LBD. Slightly more unsteady this am.   Coping/Psychosocial   Plan of Care Reviewed With patient

## 2019-09-10 NOTE — THERAPY TREATMENT NOTE
Acute Care - Occupational Therapy Treatment Note  [facility]     Patient Name: Darby Workman  : 1945  MRN: [id number]  Today's Date: 9/10/2019             Admit Date: 2019     No diagnosis found.  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/HCC)    • Acute ischemic stroke (CMS/HCC)    • Arthritis    • Coronary artery disease    • CVA (cerebral vascular accident) (CMS/HCC)    • Diabetes mellitus (CMS/HCC)    • Heart attack (CMS/HCC)    • History of blood clots    • Hyperlipidemia    • Hypertension    • Vision loss      Past Surgical History:   Procedure Laterality Date   • CAROTID ENDARTERECTOMY Left 2019    Procedure: Left carotid endarterectomy;  Surgeon: Rupert Oliva MD;  Location: [facility];  Service: Neurosurgery   • EMBOLECTOMY Left 2019    Procedure: CEREBRAL ANGIOGRAM, LEFT INTERNAL CAROTID ARTERY STENT;  Surgeon: Rupert Oliva MD;  Location: [facility] ;  Service: Neurosurgery       Therapy Treatment    Rehabilitation Treatment Summary     Row Name 09/10/19 1211             Treatment Time/Intention    Discipline  occupational therapist  -SG      Document Type  therapy note (daily note)  -SG      Subjective Information  no complaints  -SG      Mode of Treatment  individual therapy;speech-language pathology  -SG      Patient/Family Observations  Pt asleep in bed, slow to wake up but agreeable to OT  -SG      Patient Effort  good  -SG      Existing Precautions/Restrictions  fall   -SG      Recorded by [SG] Evie Fernando OTR 09/10/19 1212      Row Name 09/10/19 1211             Cognitive Assessment/Intervention- PT/OT    Affect/Mental Status (Cognitive)  confused  -SG      Orientation Status (Cognition)  oriented to;person  -SG      Follows Commands (Cognition)  follows one step commands;75-90% accuracy;repetition of directions required;verbal cues/prompting required  -SG      Attention Deficit (Cognitive)  moderate deficit  -SG      Recorded by [SG] Evie Fernando OTR 09/10/19 1212      Row Name 09/10/19 1211             Bed Mobility Assessment/Treatment    Supine-Sit Tyrrell (Bed Mobility)  minimum assist (75% patient effort);verbal cues  -SG      Recorded by [SG] Evie Fernando R 09/10/19 1212      Row Name 09/10/19 1211             Functional Mobility    Functional Mobility- Ind. Level  minimum assist (75% patient effort);verbal cues required  -SG      Functional Mobility- Comment  Walking to bathroom, down to common area  -SG      Recorded by [SG] Evie Fernando OTR 09/10/19 1212      Row Name 09/10/19 1211             Transfer Assessment/Treatment    Transfer Assessment/Treatment  sit-stand transfer;stand-sit transfer;toilet transfer  -SG      Recorded by [SG] Evie Fernando OTR 09/10/19 1212      Row Name 09/10/19 1211             Sit-Stand Transfer    Sit-Stand Tyrrell (Transfers)  contact guard;minimum assist (75% patient effort);verbal cues  -SG      Recorded by [SG] Evie Fernando OTR 09/10/19 1212      Row Name 09/10/19 1211             Stand-Sit Transfer    Stand-Sit Tyrrell (Transfers)  contact guard;verbal cues  -SG      Recorded by [SG] Evie Fernando OTR 09/10/19 1212      Row Name 09/10/19 1211             Toilet Transfer    Type (Toilet Transfer)  sit-stand;stand-sit  -SG      Tyrrell Level (Toilet Transfer)  contact guard;verbal cues  -SG      Recorded by [SG] Evie Fernando OTR 09/10/19 1212      Row Name 09/10/19 1211              ADL Assessment/Intervention    BADL Assessment/Intervention  upper body dressing  -SG      Recorded by [SG] Evie Fernando OTR 09/10/19 1212      Row Name 09/10/19 1211             Upper Body Dressing Assessment/Training    Upper Body Dressing [place] Level  doff;don;front opening garment;pull-over garment;minimum assist (75% patient effort);moderate assist (50% patient effort);verbal cues;nonverbal cues (demo/gesture)  -SG      Upper Body Dressing Position  supported sitting  -SG      Recorded by [SG] Evie Fernando OTR 09/10/19 1212      Row Name 09/10/19 1211             Lower Body Dressing Assessment/Training    Lower Body Dressing [place] Level  doff;don;pants/bottoms;socks;undergarment;moderate assist (50% patient effort);verbal cues;nonverbal cues (demo/gesture)  -SG      Lower Body Dressing Position  supported sitting;supported standing  -SG      Recorded by [SG] Evie Fernando OTR 09/10/19 1212      Row Name 09/10/19 1211             Grooming Assessment/Training    [place] Level (Grooming)  grooming skills;hair care, combing/brushing;wash face, hands;contact guard assist;verbal cues;nonverbal cues (demo/gesture)  -SG      Assistive Devices (Grooming)  hand over hand  -SG      Grooming Position  sink side;supported standing  -SG      Recorded by [SG] Evie Fernando OTR 09/10/19 1212      Row Name 09/10/19 1211             Positioning and Restraints    Pre-Treatment Position  sitting in chair/recliner  -SG      Post Treatment Position  chair  -SG      In Chair  sitting;patient within staff view  -SG      Recorded by [SG] Evie Fernando OTR 09/10/19 1212      Row Name 09/10/19 1211             Pain Scale: Numbers Pre/Post-Treatment    Pain Scale: Numbers, Pretreatment  0/10 - no pain  -SG      Recorded by [SG] Evie Fernando OTR 09/10/19 1212      Row Name                Wound 07/17/19 1015 Left neck incision    Wound - Properties Group Date first assessed: 07/17/19 [LD]  Time first assessed: 1015 [LD] Side: Left [LD] Location: neck [LD] Type: incision [LD] Recorded by:  [LD] Martha Foster RN 07/17/19 1015      User Key  (r) = Recorded By, (t) = Taken By, (c) = Cosigned By    Initials Name Effective Dates Discipline    SG Kassie Evie, OTR 12/26/18 -  OT    LD Martha Foster, RN 06/16/16 -  Nurse        Wound 07/17/19 1015 Left neck incision (Active)   Dressing Appearance open to air 9/10/2019  3:30 AM   Closure Liquid skin adhesive 9/10/2019  3:30 AM   Base dry 9/10/2019  3:30 AM   Drainage Amount none 9/10/2019  3:30 AM   Dressing Care, Wound open to air 9/10/2019  3:30 AM     Rehab Goal Summary     Row Name 09/10/19 1130             Transfer Goal 1 (PT)    Activity/Assistive Device (Transfer Goal 1, PT)  sit-to-stand/stand-to-sit  -LB      [place] Level/Cues Needed (Transfer Goal 1, PT)  supervision required  -LB      Time Frame (Transfer Goal 1, PT)  1 week  -LB      Progress/Outcome (Transfer Goal 1, PT)  progress slower than expected  -LB         Gait Training Goal 1 (PT)    Activity/Assistive Device (Gait Training Goal 1, PT)  gait (walking locomotion)  -LB      [place] Level (Gait Training Goal 1, PT)  supervision required  -LB      Distance (Gait Goal 1, PT)  100  -LB      Time Frame (Gait Training Goal 1, PT)  1 week  -LB      Progress/Outcome (Gait Training Goal 1, PT)  progress slower than expected  -LB        User Key  (r) = Recorded By, (t) = Taken By, (c) = Cosigned By    Initials Name Provider Type Discipline    LB Evelina Plaza, PT Physical Therapist PT        Occupational Therapy Education     Title: PT OT SLP Therapies (Not Started)     Topic: Occupational Therapy (In Progress)     Point: ADL training (In Progress)     Description: Instruct learner(s) on proper safety adaptation and remediation techniques during self care or transfers.   Instruct in proper use of assistive devices.    Learning Progress Summary           Patient Acceptance, E, NR by  SK at 9/7/2019  4:32 AM    DENNY Angeles, NL by RP at 8/30/2019 11:56 AM    Comment:  OT educ on OT role in therapeutic process and pt's POC.                   Point: Home exercise program (In Progress)     Description: Instruct learner(s) on appropriate technique for monitoring, assisting and/or progressing therapeutic exercises/activities.    Learning Progress Summary           Patient Acceptance, E, NR by SK at 9/7/2019  4:32 AM                   Point: Precautions (In Progress)     Description: Instruct learner(s) on prescribed precautions during self-care and functional transfers.    Learning Progress Summary           Patient Acceptance, E, NR by SK at 9/7/2019  4:32 AM                   Point: Body mechanics (In Progress)     Description: Instruct learner(s) on proper positioning and spine alignment during self-care, functional mobility activities and/or exercises.    Learning Progress Summary           Patient Acceptance, E, NR by SK at 9/7/2019  4:32 AM                               User Key     Initials Effective Dates Name Provider Type Discipline    SK 05/15/17 -  Mary Small RN Registered Nurse Nurse     05/03/18 -  Gabriela Brown OT Occupational Therapist OT                OT Recommendation and Plan     Plan of Care Review  Plan of Care Reviewed With: patient  Plan of Care Reviewed With: patient  Outcome Summary: Tolerating ADLs well this am with OT, improving with independence, requires mod A for UBD/LBD. Slightly more unsteady this am.  Outcome Measures     Row Name 09/09/19 1200 09/09/19 0800          How much help from another person do you currently need...    Turning from your back to your side while in flat bed without using bedrails?  --  4  -LH     Moving from lying on back to sitting on the side of a flat bed without bedrails?  --  4  -LH     Moving to and from a bed to a chair (including a wheelchair)?  --  3  -LH     Standing up from a chair using your arms (e.g., wheelchair, bedside  chair)?  --  3  -LH     Climbing 3-5 steps with a railing?  --  3  -LH     To walk in hospital room?  --  3  -     AM-PAC 6 Clicks Score (PT)  --  20  -LH        How much help from another is currently needed...    Putting on and taking off regular lower body clothing?  3  -SG  --     Bathing (including washing, rinsing, and drying)  3  -SG  --     Toileting (which includes using toilet bed pan or urinal)  3  -SG  --     Putting on and taking off regular upper body clothing  3  -SG  --     Taking care of personal grooming (such as brushing teeth)  3  -SG  --     Eating meals  3  -SG  --     AM-PAC 6 Clicks Score (OT)  18  -SG  --        Functional Assessment    Outcome Measure Options  AM-PAC 6 Clicks Daily Activity (OT)  -  AM-PAC 6 Clicks Basic Mobility (PT)  -       User Key  (r) = Recorded By, (t) = Taken By, (c) = Cosigned By    Initials Name Provider Type    Evie Bae OTR Occupational Therapist     Laura Foster, PT Physical Therapist           Time Calculation:   Time Calculation- OT     Row Name 09/10/19 1213             Time Calculation- OT    OT Start Time  0838  -      OT Stop Time  0901  -      OT Time Calculation (min)  23 min  -      OT Received On  09/10/19  -        User Key  (r) = Recorded By, (t) = Taken By, (c) = Cosigned By    Initials Name Provider Type    Evie Bae OTR Occupational Therapist        Therapy Charges for Today     Code Description Service Date Service Provider Modifiers Qty    97953298601 HC OT SELF CARE/MGMT/TRAIN EA 15 MIN 9/9/2019 Evie Fernando OTR GO 1    44340582724 HC OT SELF CARE/MGMT/TRAIN EA 15 MIN 9/10/2019 Evie Fernando OTR GO 2               JOHN Walton  9/10/2019

## 2019-09-10 NOTE — PROGRESS NOTES
The patient has had no further episodes of behavioral disturbance for the past couple of days and has been more cooperative with care.  We continue to work towards disposition for the patient.

## 2019-09-10 NOTE — PLAN OF CARE
Problem: Patient Care Overview  Goal: Plan of Care Review  Outcome: Ongoing (interventions implemented as appropriate)   09/10/19 1200 09/10/19 1602 09/10/19 1646   Plan of Care Review   Progress --  --  improving   OTHER   Outcome Summary --  --  Pt pleasant, cooperative w/ care, and compliant w/ meds this shift. Unable to fully assess pt orientation d/t global aphasia. Pt presents w/ brighter affect. No falls or further issues reported at this time. WIll continue to monitor and provide safe environment.    Coping/Psychosocial   Plan of Care Reviewed With --  patient --    Coping/Psychosocial   Patient Agreement with Plan of Care agrees --  --        Problem: Overarching Goals (Adult)  Goal: Adheres to Safety Considerations for Self and Others  Outcome: Ongoing (interventions implemented as appropriate)   09/10/19 1646   Overarching Goals (Adult)   Adheres to Safety Considerations for Self and Others making progress toward outcome     Intervention: Develop and Maintain Individualized Safety Plan   08/30/19 1400 09/10/19 1200 09/10/19 1600   Develop and Maintain Individualized Safety Plan   Safety Measures --  --  safety rounds completed   Violence Risk   Feels Like Hurting Others --  no --    Previous Attempt to Harm Others --  no --    Precipitating Factors (Confusion) --  --    C-SSRS (Screen-Recent) Past Month   Q1 Wished to be Dead (Past Month) --  no --    Q2 Suicidal Thoughts (Past Month) --  no --    Q6 Suicide Behavior (Lifetime) --  no --    Level of Risk per Screen --  screen negative --        Goal: Optimized Coping Skills in Response to Life Stressors  Outcome: Ongoing (interventions implemented as appropriate)   09/10/19 1646   Overarching Goals (Adult)   Optimized Coping Skills in Response to Life Stressors making progress toward outcome     Intervention: Promote Effective Coping Strategies   09/10/19 1200   Coping/Psychosocial Interventions   Supportive Measures active listening  utilized;verbalization of feelings encouraged;self-care encouraged;relaxation techniques promoted;positive reinforcement provided;decision-making supported       Goal: Develops/Participates in Therapeutic Eunice to Support Successful Transition  Outcome: Ongoing (interventions implemented as appropriate)   09/10/19 1646   Overarching Goals (Adult)   Develops/Participates in Therapeutic Eunice to Support Successful Transition making progress toward outcome     Intervention: Foster Therapeutic Eunice   09/10/19 1200   Interventions   Trust Relationship/Rapport care explained;choices provided;thoughts/feelings acknowledged;reassurance provided;questions encouraged     Intervention: Mutually Develop Transition Plan   09/10/19 1200   Mutually Develop Transition Plan   Transition Support crisis management plan promoted         Problem: Decreased Participation/Engagement (Depressive Signs/Symptoms) (Adult)  Goal: Increased Participation/Engagement (Depressive Signs/Symptoms)  Outcome: Ongoing (interventions implemented as appropriate)   09/08/19 1637 09/10/19 1646   Increased Participation/Engagement (Depressive Signs/Symptoms)   Increased Participation/Engagement Action Step/Short Term Goal (STG) Established 09/08/19 --    Increased Participation/Engagement Time Frame for Action Step (STG) --  2 days   Increased Participation/Engagement Action Step (STG) Outcome --  making progress toward outcome       Problem: Social/Occupational/Functional Impairment (Depressive Signs/Symptoms) (Adult)  Goal: Improved Social/Occupational/Functional Skills (Depressive Signs/Symptoms)  Outcome: Ongoing (interventions implemented as appropriate)   09/08/19 1637 09/10/19 1646   Improved Social/Occupational/Functional Skills (Depressive Signs/Symptoms)   Improved Social/Occupational/Functional Skills Action Step/Short Term Goal (STG) Established 09/08/19 --    Improved Social/Occupational/Functional Skills Time Frame for Action Step  (STG) --  2 days   Improved Social/Occupational/Functional Skills Action Step (STG) Outcome --  making progress toward outcome       Problem: Psychomotor Impairment (Depressive Signs/Symptoms) (Adult)  Goal: Improved Psychomotor Symptoms (Depressive Signs/Symptoms)  Outcome: Ongoing (interventions implemented as appropriate)   09/08/19 1637 09/10/19 1646   Improved Psychomotor Symptoms (Depressive Signs/Symptoms)   Improved Psychomotor Symptoms Action Step/Short Term Goal (STG) Established 09/08/19 --    Improved Psychomotor Symptoms Time Frame for Action Step (STG) --  2 days   Improved Psychomotor Symptoms Action Step (STG) Outcome --  making progress toward outcome       Problem: Cognitive Impairment (Dementia Signs/Symptoms) (Adult)  Goal: Optimized Cognitive Function (Dementia Signs/Symptoms)  Outcome: Ongoing (interventions implemented as appropriate)   09/08/19 1637 09/10/19 1646   Optimized Cognitive Function (Dementia Signs/Symptoms)   Optimized Cognitive Ability Action Step/Short Term Goal (STG) Established 09/08/19 --    Optimized Cognitive Ability Time Frame for Action Step (STG) --  2 days   Optimized Cognitive Ability Action Step (STG) Outcome --  making progress toward outcome       Problem: Behavioral Impairment (Dementia Signs/Symptoms) (Adult)  Goal: Improved Behavioral Control (Dementia Signs/Symptoms)  Outcome: Ongoing (interventions implemented as appropriate)   09/08/19 1637 09/10/19 1646   Improved Behavioral Control (Dementia Signs/Symptoms)   Improved Behavior Control Action Step/Short Term Goal (STG) Established 09/08/19 --    Improved Behavioral Control Time Frame for Action Step (STG) --  2 days   Improved Behavioral Control Action Step (STG) Outcome --  making progress toward outcome       Problem: Psychological Impairment (Dementia Signs/Symptoms) (Adult)  Goal: Improved Psychological Symptoms (Dementia Signs/Symptoms)  Outcome: Ongoing (interventions implemented as appropriate)    09/08/19 1637 09/10/19 1646   Improved Psychological Symptoms (Dementia Signs/Symptoms)   Improved Psychological Symptoms Action Step/Short Term Goal (STG) Established 09/08/19 --    Improved Psychologic Symptoms Time Frame for Action Step (STG) --  2 days   Improved Psychological Symptoms Action Step (STG) Outcome --  making progress toward outcome       Problem: Skin Injury Risk (Adult)  Goal: Skin Health and Integrity  Outcome: Ongoing (interventions implemented as appropriate)   09/10/19 1646   Skin Injury Risk (Adult)   Skin Health and Integrity making progress toward outcome       Problem: Fall Risk (Adult)  Goal: Absence of Fall  Outcome: Ongoing (interventions implemented as appropriate)   09/10/19 1646   Fall Risk (Adult)   Absence of Fall making progress toward outcome

## 2019-09-10 NOTE — PROGRESS NOTES
Continued Stay Note  [facility]     Patient Name: Darby Workman  MRN: [id number]  Today's Date: 9/10/2019    Admit Date: 8/29/2019    Discharge Plan     Row Name 09/10/19 1248       Plan    Plan Comments  AYSHA called and spoke w/pt's daughter, Silva to discuss d/c plans.  Pt's daughter stated that the family is considering taking pt home with home health but the details have not been worked out yet.  Pt's daughter stated that rehab would be the best option at this time. Pt's daughter stated that the family are exploring options and she would contact AYSHA tomorrow w/more info.        Discharge Codes    No documentation.             SAMIR Buchanan

## 2019-09-11 LAB
GLUCOSE BLDC GLUCOMTR-MCNC: 118 MG/DL (ref 70–130)
GLUCOSE BLDC GLUCOMTR-MCNC: 129 MG/DL (ref 70–130)
GLUCOSE BLDC GLUCOMTR-MCNC: 185 MG/DL (ref 70–130)
GLUCOSE BLDC GLUCOMTR-MCNC: 93 MG/DL (ref 70–130)

## 2019-09-11 PROCEDURE — 82962 GLUCOSE BLOOD TEST: CPT

## 2019-09-11 PROCEDURE — 97110 THERAPEUTIC EXERCISES: CPT

## 2019-09-11 RX ORDER — POTASSIUM CHLORIDE 750 MG/1
20 CAPSULE, EXTENDED RELEASE ORAL DAILY
Status: DISCONTINUED | OUTPATIENT
Start: 2019-09-12 | End: 2019-09-13 | Stop reason: HOSPADM

## 2019-09-11 RX ADMIN — METFORMIN HYDROCHLORIDE 1000 MG: 1000 TABLET ORAL at 08:41

## 2019-09-11 RX ADMIN — AMLODIPINE BESYLATE 5 MG: 5 TABLET ORAL at 08:45

## 2019-09-11 RX ADMIN — DORZOLAMIDE HYDROCHLORIDE 1 DROP: 20 SOLUTION/ DROPS OPHTHALMIC at 08:48

## 2019-09-11 RX ADMIN — OLANZAPINE 2.5 MG: 2.5 TABLET, FILM COATED ORAL at 08:44

## 2019-09-11 RX ADMIN — GABAPENTIN 100 MG: 100 CAPSULE ORAL at 08:44

## 2019-09-11 RX ADMIN — APIXABAN 5 MG: 5 TABLET, FILM COATED ORAL at 21:06

## 2019-09-11 RX ADMIN — DEXTROMETHORPHAN HYDROBROMIDE AND QUINIDINE SULFATE 1 CAPSULE: 20; 10 CAPSULE, GELATIN COATED ORAL at 08:44

## 2019-09-11 RX ADMIN — METFORMIN HYDROCHLORIDE 1000 MG: 1000 TABLET ORAL at 17:53

## 2019-09-11 RX ADMIN — HYDRALAZINE HYDROCHLORIDE 25 MG: 25 TABLET ORAL at 21:06

## 2019-09-11 RX ADMIN — LORAZEPAM 0.5 MG: 0.5 TABLET ORAL at 08:44

## 2019-09-11 RX ADMIN — PAROXETINE HYDROCHLORIDE 10 MG: 10 TABLET, FILM COATED ORAL at 08:43

## 2019-09-11 RX ADMIN — PANTOPRAZOLE SODIUM 40 MG: 40 TABLET, DELAYED RELEASE ORAL at 17:52

## 2019-09-11 RX ADMIN — GABAPENTIN 100 MG: 100 CAPSULE ORAL at 21:06

## 2019-09-11 RX ADMIN — HYDRALAZINE HYDROCHLORIDE 25 MG: 25 TABLET ORAL at 08:41

## 2019-09-11 RX ADMIN — BRIMONIDINE TARTRATE 1 DROP: 2 SOLUTION OPHTHALMIC at 08:48

## 2019-09-11 RX ADMIN — DEXTROMETHORPHAN HYDROBROMIDE AND QUINIDINE SULFATE 1 CAPSULE: 20; 10 CAPSULE, GELATIN COATED ORAL at 21:06

## 2019-09-11 RX ADMIN — LORAZEPAM 0.5 MG: 0.5 TABLET ORAL at 21:06

## 2019-09-11 RX ADMIN — ASPIRIN 325 MG: 325 TABLET ORAL at 08:42

## 2019-09-11 RX ADMIN — PANTOPRAZOLE SODIUM 40 MG: 40 TABLET, DELAYED RELEASE ORAL at 08:40

## 2019-09-11 RX ADMIN — OLANZAPINE 2.5 MG: 2.5 TABLET, FILM COATED ORAL at 21:06

## 2019-09-11 RX ADMIN — HYDRALAZINE HYDROCHLORIDE 25 MG: 25 TABLET ORAL at 15:36

## 2019-09-11 RX ADMIN — CLOPIDOGREL 75 MG: 75 TABLET, FILM COATED ORAL at 08:42

## 2019-09-11 RX ADMIN — POTASSIUM CHLORIDE 20 MEQ: 1.5 POWDER, FOR SOLUTION ORAL at 08:42

## 2019-09-11 RX ADMIN — NEBIVOLOL HYDROCHLORIDE 20 MG: 10 TABLET ORAL at 08:43

## 2019-09-11 RX ADMIN — APIXABAN 5 MG: 5 TABLET, FILM COATED ORAL at 08:42

## 2019-09-11 RX ADMIN — ATORVASTATIN CALCIUM 80 MG: 80 TABLET, FILM COATED ORAL at 21:06

## 2019-09-11 RX ADMIN — DORZOLAMIDE HYDROCHLORIDE 1 DROP: 20 SOLUTION/ DROPS OPHTHALMIC at 21:06

## 2019-09-11 RX ADMIN — LOSARTAN POTASSIUM 100 MG: 100 TABLET, FILM COATED ORAL at 17:52

## 2019-09-11 RX ADMIN — BRIMONIDINE TARTRATE 1 DROP: 2 SOLUTION OPHTHALMIC at 21:06

## 2019-09-11 RX ADMIN — Medication 3 MG: at 21:06

## 2019-09-11 NOTE — PROGRESS NOTES
Continued Stay Note  [facility]     Patient Name: Darby Workman  MRN: [id number]  Today's Date: 9/11/2019    Admit Date: 8/29/2019    Discharge Plan     Row Name 09/11/19 1240       Plan    Plan Comments  AYSHA called and spoke w/Nelly, ElderCare Coordinator for [employer] to discuss pt's d/c.  Nelly stated that she has left a vm message for [place] liaison for [facility] to discuss placement.  AYSHA advised that pt had been accepted into [facility].  AYSHA also advised that pt has received several denials.  Nelly stated that the family scheduled an appt to come sign papers at the hospital w/an  on Monday; AYSHA advised that this could not take place on the unit.  Nelly stated that she would contact AYSHA after she speaks w/[facility].    Row Name 09/11/19 5540       Plan    Plan Comments  AYSHA called and spoke w/pt's daughter to discuss pt's d/c plans.  AYSHA advised that pt was accepted into [facility] for rehab.  Pt's daughter stated that they met with an Elder Care  to day and that their Elder CareCoordinator, Nelly believes that she could get the facility to re-consider decision for pt to be admitted to [facility].  Pt's daughter asked AYSHA to contact Nelly at [phone] or [phone].  AYSHA advised that pt is ready for d/c Thursday or Friday.          Discharge Codes    No documentation.             SAMIR Buchanan

## 2019-09-11 NOTE — PLAN OF CARE
Problem: Patient Care Overview  Goal: Plan of Care Review  Outcome: Ongoing (interventions implemented as appropriate)   09/11/19 0927   Coping/Psychosocial   Plan of Care Reviewed With patient   Coping/Psychosocial   Patient Agreement with Plan of Care agrees   Coping/Psychosocial   Consent Given to Review Plan with Pt following commands much better today and agreeable during therapy session.

## 2019-09-11 NOTE — PLAN OF CARE
Problem: Patient Care Overview  Goal: Plan of Care Review  Outcome: Ongoing (interventions implemented as appropriate)   09/11/19 0227 09/11/19 0358   Plan of Care Review   Progress --  improving   OTHER   Outcome Summary --  Pt pleasant, cooperative and compliant with medication. Pt denies anxiety, depression, SI, HI, halluccinations and pain. Will continue to monitor mood and behavior and provide safe environemtn.   Coping/Psychosocial   Plan of Care Reviewed With patient --    Coping/Psychosocial   Patient Agreement with Plan of Care agrees --        Problem: Overarching Goals (Adult)  Goal: Adheres to Safety Considerations for Self and Others  Outcome: Ongoing (interventions implemented as appropriate)    Goal: Optimized Coping Skills in Response to Life Stressors  Outcome: Ongoing (interventions implemented as appropriate)    Goal: Develops/Participates in Therapeutic [place] to Support Successful Transition  Outcome: Ongoing (interventions implemented as appropriate)      Problem: Decreased Participation/Engagement (Depressive Signs/Symptoms) (Adult)  Goal: Increased Participation/Engagement (Depressive Signs/Symptoms)  Outcome: Ongoing (interventions implemented as appropriate)      Problem: Social/Occupational/Functional Impairment (Depressive Signs/Symptoms) (Adult)  Goal: Improved Social/Occupational/Functional Skills (Depressive Signs/Symptoms)  Outcome: Ongoing (interventions implemented as appropriate)      Problem: Psychomotor Impairment (Depressive Signs/Symptoms) (Adult)  Goal: Improved Psychomotor Symptoms (Depressive Signs/Symptoms)  Outcome: Ongoing (interventions implemented as appropriate)      Problem: Cognitive Impairment (Dementia Signs/Symptoms) (Adult)  Goal: Optimized Cognitive Function (Dementia Signs/Symptoms)  Outcome: Ongoing (interventions implemented as appropriate)      Problem: Behavioral Impairment (Dementia Signs/Symptoms) (Adult)  Goal: Improved Behavioral Control (Dementia  Signs/Symptoms)  Outcome: Ongoing (interventions implemented as appropriate)      Problem: Psychological Impairment (Dementia Signs/Symptoms) (Adult)  Goal: Improved Psychological Symptoms (Dementia Signs/Symptoms)  Outcome: Ongoing (interventions implemented as appropriate)      Problem: Skin Injury Risk (Adult)  Goal: Skin Health and Integrity  Outcome: Ongoing (interventions implemented as appropriate)      Problem: Fall Risk (Adult)  Goal: Absence of Fall  Outcome: Ongoing (interventions implemented as appropriate)

## 2019-09-11 NOTE — PLAN OF CARE
Problem: Patient Care Overview  Goal: Plan of Care Review  Outcome: Ongoing (interventions implemented as appropriate)   09/11/19 1215 09/11/19 1836   Plan of Care Review   Progress --  improving   OTHER   Outcome Summary --  Pt pleasant, cooperative w/ care, and compliant w/ meds this shift. Unable to fully assess pt d/t limited speech. Pt appropriate w/ staff and appears w/ brighter affect. No s/s coverage needed this shift. No falls or further issues reported at this time. WIll continue to monitor and provide safe environment.   Coping/Psychosocial   Plan of Care Reviewed With patient --    Coping/Psychosocial   Patient Agreement with Plan of Care agrees;unable to participate --        Problem: Overarching Goals (Adult)  Goal: Adheres to Safety Considerations for Self and Others  Outcome: Ongoing (interventions implemented as appropriate)   09/11/19 1836   Overarching Goals (Adult)   Adheres to Safety Considerations for Self and Others making progress toward outcome     Intervention: Develop and Maintain Individualized Safety Plan   08/30/19 1400 09/11/19 1215 09/11/19 1800   Develop and Maintain Individualized Safety Plan   Safety Measures --  --  safety rounds completed   Violence Risk   Feels Like Hurting Others --  no --    Previous Attempt to Harm Others --  no --    Precipitating Factors (Confusion) --  --    C-SSRS (Screen-Recent) Past Month   Q1 Wished to be Dead (Past Month) --  no --    Q2 Suicidal Thoughts (Past Month) --  no --    Q6 Suicide Behavior (Lifetime) --  no --    Level of Risk per Screen --  screen negative --        Goal: Optimized Coping Skills in Response to Life Stressors  Outcome: Ongoing (interventions implemented as appropriate)   09/11/19 1836   Overarching Goals (Adult)   Optimized Coping Skills in Response to Life Stressors making progress toward outcome     Intervention: Promote Effective Coping Strategies   09/11/19 1215   Coping/Psychosocial Interventions   Supportive Measures  active listening utilized;verbalization of feelings encouraged;self-care encouraged;relaxation techniques promoted;positive reinforcement provided;decision-making supported       Goal: Develops/Participates in Therapeutic [place] to Support Successful Transition  Outcome: Ongoing (interventions implemented as appropriate)   09/11/19 1836   Overarching Goals (Adult)   Develops/Participates in Therapeutic [place] to Support Successful Transition making progress toward outcome     Intervention: Foster Therapeutic [place]   09/11/19 1215   Interventions   Trust Relationship/Rapport care explained;choices provided;thoughts/feelings acknowledged;reassurance provided;questions encouraged     Intervention: Mutually Develop Transition Plan   09/11/19 1215   Mutually Develop Transition Plan   Transition Support crisis management plan promoted         Problem: Decreased Participation/Engagement (Depressive Signs/Symptoms) (Adult)  Goal: Increased Participation/Engagement (Depressive Signs/Symptoms)  Outcome: Ongoing (interventions implemented as appropriate)   09/08/19 1637 09/11/19 1836   Increased Participation/Engagement (Depressive Signs/Symptoms)   Increased Participation/Engagement Action Step/Short Term Goal (STG) Established 09/08/19 --    Increased Participation/Engagement Time Frame for Action Step (STG) --  1 day       Problem: Social/Occupational/Functional Impairment (Depressive Signs/Symptoms) (Adult)  Goal: Improved Social/Occupational/Functional Skills (Depressive Signs/Symptoms)  Outcome: Ongoing (interventions implemented as appropriate)   09/08/19 1637 09/11/19 1836   Improved Social/Occupational/Functional Skills (Depressive Signs/Symptoms)   Improved Social/Occupational/Functional Skills Action Step/Short Term Goal (STG) Established 09/08/19 --    Improved Social/Occupational/Functional Skills Time Frame for Action Step (STG) --  1 day   Improved Social/Occupational/Functional Skills Action Step (STG)  Outcome --  making progress toward outcome       Problem: Psychomotor Impairment (Depressive Signs/Symptoms) (Adult)  Goal: Improved Psychomotor Symptoms (Depressive Signs/Symptoms)  Outcome: Ongoing (interventions implemented as appropriate)   09/08/19 1637 09/11/19 1836   Improved Psychomotor Symptoms (Depressive Signs/Symptoms)   Improved Psychomotor Symptoms Action Step/Short Term Goal (STG) Established 09/08/19 --    Improved Psychomotor Symptoms Time Frame for Action Step (STG) --  1 day   Improved Psychomotor Symptoms Action Step (STG) Outcome --  making progress toward outcome       Problem: Cognitive Impairment (Dementia Signs/Symptoms) (Adult)  Goal: Optimized Cognitive Function (Dementia Signs/Symptoms)  Outcome: Ongoing (interventions implemented as appropriate)   09/08/19 1637 09/11/19 1836   Optimized Cognitive Function (Dementia Signs/Symptoms)   Optimized Cognitive Ability Action Step/Short Term Goal (STG) Established 09/08/19 --    Optimized Cognitive Ability Time Frame for Action Step (STG) --  1 day   Optimized Cognitive Ability Action Step (STG) Outcome --  making progress toward outcome       Problem: Behavioral Impairment (Dementia Signs/Symptoms) (Adult)  Goal: Improved Behavioral Control (Dementia Signs/Symptoms)  Outcome: Ongoing (interventions implemented as appropriate)   09/08/19 1637 09/11/19 1836   Improved Behavioral Control (Dementia Signs/Symptoms)   Improved Behavior Control Action Step/Short Term Goal (STG) Established 09/08/19 --    Improved Behavioral Control Time Frame for Action Step (STG) --  1 day   Improved Behavioral Control Action Step (STG) Outcome --  making progress toward outcome       Problem: Psychological Impairment (Dementia Signs/Symptoms) (Adult)  Goal: Improved Psychological Symptoms (Dementia Signs/Symptoms)  Outcome: Ongoing (interventions implemented as appropriate)   09/08/19 1637 09/11/19 1836   Improved Psychological Symptoms (Dementia Signs/Symptoms)    Improved Psychological Symptoms Action Step/Short Term Goal (STG) Established 09/08/19 --    Improved Psychologic Symptoms Time Frame for Action Step (STG) --  1 day   Improved Psychological Symptoms Action Step (STG) Outcome --  making progress toward outcome       Problem: Skin Injury Risk (Adult)  Goal: Skin Health and Integrity  Outcome: Ongoing (interventions implemented as appropriate)   09/11/19 1836   Skin Injury Risk (Adult)   Skin Health and Integrity making progress toward outcome       Problem: Fall Risk (Adult)  Goal: Absence of Fall  Outcome: Ongoing (interventions implemented as appropriate)   09/11/19 1836   Fall Risk (Adult)   Absence of Fall making progress toward outcome

## 2019-09-11 NOTE — PROGRESS NOTES
Continued Stay Note  [facility]     Patient Name: Darby Workman  MRN: [id number]  Today's Date: 9/11/2019    Admit Date: 8/29/2019    Discharge Plan     Row Name 09/11/19 1228       Plan    Plan Comments  AYSHA called and spoke w/pt's daughter to discuss pt's d/c plans.  AYSHA advised that pt was accepted into [facility] for rehab.  Pt's daughter stated that they met with an Elder Care  to day and that their Elder CareCoordinator, Nelly believes that she could get the facility to re-consider decision for pt to be admitted to [facility].  Pt's daughter asked AYSHA to contact Nelly at [phone] or [phone].  AYSHA advised that pt is ready for d/c Thursday or Friday.          Discharge Codes    No documentation.             SAMIR Buchanan

## 2019-09-11 NOTE — THERAPY TREATMENT NOTE
Acute Care - Physical Therapy Treatment Note  [facility]     Patient Name: Darby Workman  : 1945  MRN: [id number]  Today's Date: 2019             Admit Date: 2019    Visit Dx:  No diagnosis found.  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)   • Right-sided headache   • Acute ischemic stroke (CMS/HCC)   • Embolic stroke (CMS/HCC)   • Anticoagulated by anticoagulation treatment   • Stroke (cerebrum) (CMS/HCC)   • Dysthymic disorder   • Hypotension       Therapy Treatment    Rehabilitation Treatment Summary     Row Name 19             Treatment Time/Intention    Discipline  physical therapist  -KELBY      Document Type  therapy note (daily note)  -KELBY      Subjective Information  no complaints was repeating some of PT commands  -KELBY      Mode of Treatment  physical therapy  -KELBY      Patient/Family Observations  Pt up in chair in no acute distress.   -KELBY      Therapy Frequency (PT Clinical Impression)  3 times/wk to 5x/wk   -KELBY      Existing Precautions/Restrictions  fall  -KELBY      Recorded by [KELBY] Poppy Rosa, PT 19      Row Name 19             Cognitive Assessment/Intervention- PT/OT    Affect/Mental Status (Cognitive)  flat/blunted affect;confused  -KELBY      Orientation Status (Cognition)  oriented to;person  -KELBY      Follows Commands (Cognition)  follows one step commands;75-90% accuracy;repetition of directions required;verbal cues/prompting required  -KELBY      Cognitive Function (Cognitive)  safety deficit;memory deficit  -KELBY      Attention Deficit (Cognitive)  moderate deficit  -KELBY      Recorded by [KELBY] Poppy Rosa, PT 19      Row Name 19             Bed Mobility Assessment/Treatment    Comment (Bed Mobility)  up in chair   -KELBY      Recorded by [KELBY]  Poppy Rosa, PT 09/11/19 0928      Row Name 09/11/19 0910             Sit-Stand Transfer    Sit-Stand [place] (Transfers)  contact guard;minimum assist (75% patient effort);verbal cues  -KELBY      Assistive Device (Sit-Stand Transfers)  other (see comments) no device   -KELBY      Recorded by [KELBY] Poppy Rosa, PT 09/11/19 0928      Row Name 09/11/19 0910             Stand-Sit Transfer    Stand-Sit [place] (Transfers)  contact guard;verbal cues  -KELBY      Assistive Device (Stand-Sit Transfers)  other (see comments) no device   -KELBY      Recorded by [KELBY] Poppy Rosa, PT 09/11/19 0928      Row Name 09/11/19 0910             Gait/Stairs Assessment/Training    [place] Level (Gait)  contact guard;minimum assist (75% patient effort)  -KELBY      Assistive Device (Gait)  -- HHA   -KELBY      Distance in Feet (Gait)  80  -KELBY      Pattern (Gait)  step-to  -KELBY      Deviations/Abnormal Patterns (Gait)  festinating/shuffling;eliza decreased;right sided deviations;stride length decreased  -KELBY      Bilateral Gait Deviations  forward flexed posture;heel strike decreased  -KELBY      Recorded by [KELBY] Poppy Rosa, PT 09/11/19 0928      Row Name 09/11/19 0910             Lower Extremity Seated Therapeutic Exercise    Performed, Seated Lower Extremity (Therapeutic Exercise)  LAQ (long arc quad), knee extension;ankle dorsiflexion/plantarflexion  -KELBY      Exercise Type, Seated Lower Extremity (Therapeutic Exercise)  AROM (active range of motion)  -KELBY      Sets/Reps Detail, Seated Lower Extremity (Therapeutic Exercise)  1/10  -KELBY      Comment, Seated Lower Extremity (Therapeutic Exercise)  verbal cues only   -KELBY      Recorded by [KELBY] Poppy Rosa, PT 09/11/19 0928      Row Name 09/11/19 0910             Positioning and Restraints    Pre-Treatment Position  sitting in chair/recliner  -KELBY      Post Treatment Position  chair  -KELBY      In Chair  reclined;patient within staff view  -KELBY      Recorded by [KELBY]  Poppy Rosa, PT 09/11/19 0928      Row Name 09/11/19 0910             Pain Scale: FACES Pre/Post-Treatment    Pain: FACES Scale, Pretreatment  0-->no hurt  -KELBY      Pain: FACES Scale, Post-Treatment  0-->no hurt  -KELBY      Recorded by [KELBY] Poppy Rosa, PT 09/11/19 0928      Row Name                Wound 07/17/19 1015 Left neck incision    Wound - Properties Group Date first assessed: 07/17/19 [LD] Time first assessed: 1015 [LD] Side: Left [LD] Location: neck [LD] Type: incision [LD] Recorded by:  [LD] Martha Foster RN 07/17/19 1015      User Key  (r) = Recorded By, (t) = Taken By, (c) = Cosigned By    Initials Name Effective Dates Discipline    KELBY Poppy Rosa, PT 06/08/18 -  PT    LD Martha Foster RN 06/16/16 -  Nurse          Wound 07/17/19 1015 Left neck incision (Active)   Dressing Appearance open to air 9/11/2019  2:27 AM   Closure Liquid skin adhesive 9/11/2019  2:27 AM   Base dry 9/11/2019  2:27 AM   Drainage Amount none 9/11/2019  2:27 AM   Dressing Care, Wound open to air 9/11/2019  2:27 AM           Physical Therapy Education     Title: PT OT SLP Therapies (Not Started)     Topic: Physical Therapy (In Progress)     Point: Mobility training (Done)     Learning Progress Summary           Patient Acceptance, E,TB, VU,NR by KELBY at 9/11/2019  9:28 AM    Acceptance, E,TB, NR by GINNY at 9/10/2019 11:31 AM    Acceptance, E, NR by GREGORY at 9/9/2019  8:24 AM    Acceptance, E, NR by SK at 9/7/2019  4:32 AM    Acceptance, E,TB, NR,DU by KELBY at 9/4/2019  9:55 AM                   Point: Home exercise program (In Progress)     Learning Progress Summary           Patient Acceptance, E, NR by GREGORY at 9/9/2019  8:24 AM    Acceptance, E, NR by SK at 9/7/2019  4:32 AM                   Point: Body mechanics (In Progress)     Learning Progress Summary           Patient Acceptance, E, NR by SK at 9/7/2019  4:32 AM    Acceptance, DENNY NR by YAKOV at 8/29/2019  7:32 PM                   Point: Precautions (In Progress)      Learning Progress Summary           Patient Acceptance, E, NR by SK at 9/7/2019  4:32 AM    Acceptance, E, NR by MD at 9/6/2019  9:56 AM    Acceptance, E, NR by MD at 9/3/2019  9:22 AM    Acceptance, E, NR,NL by MD at 8/30/2019  2:29 PM                               User Key     Initials Effective Dates Name Provider Type Discipline     06/08/18 -  Poppy Rosa, PT Physical Therapist PT    LB 04/03/18 -  Evelina Plaza, PT Physical Therapist PT    LH 04/03/18 -  Laura Foster, PT Physical Therapist PT    MD 04/03/18 -  Mira Chadwick, PT Physical Therapist PT    SK 05/15/17 -  Mary Small RN Registered Nurse Nurse    YAKOV 02/15/18 -  Danilo Rosa, RN Registered Nurse Nurse                PT Recommendation and Plan  Therapy Frequency (PT Clinical Impression): 3 times/wk(to 5x/wk )  Plan of Care Reviewed With: patient  Progress: improving  Outcome Summary: Pt was able to follow seated exercise instructions with little cues.   Outcome Measures     Row Name 09/11/19 0910 09/09/19 1200 09/09/19 0800       How much help from another person do you currently need...    Turning from your back to your side while in flat bed without using bedrails?  4  -KELBY  --  4  -LH    Moving from lying on back to sitting on the side of a flat bed without bedrails?  4  -KELBY  --  4  -LH    Moving to and from a bed to a chair (including a wheelchair)?  3  -KELBY  --  3  -LH    Standing up from a chair using your arms (e.g., wheelchair, bedside chair)?  3  -KELBY  --  3  -LH    Climbing 3-5 steps with a railing?  3  -KELBY  --  3  -LH    To walk in hospital room?  3  -KELBY  --  3  -LH    AM-PAC 6 Clicks Score (PT)  20  -KELBY  --  20  -LH       How much help from another is currently needed...    Putting on and taking off regular lower body clothing?  --  3  -SG  --    Bathing (including washing, rinsing, and drying)  --  3  -SG  --    Toileting (which includes using toilet bed pan or urinal)  --  3  -SG  --    Putting on and taking off regular upper  body clothing  --  3  -SG  --    Taking care of personal grooming (such as brushing teeth)  --  3  -SG  --    Eating meals  --  3  -SG  --    AM-PAC 6 Clicks Score (OT)  --  18  -SG  --       Functional Assessment    Outcome Measure Options  AM-PAC 6 Clicks Basic Mobility (PT)  -KELBY  AM-PAC 6 Clicks Daily Activity (OT)  -  AM-PAC 6 Clicks Basic Mobility (PT)  -      User Key  (r) = Recorded By, (t) = Taken By, (c) = Cosigned By    Initials Name Provider Type    SG Evie Fernando, OTR Occupational Therapist    Poppy Trevino, PT Physical Therapist     Laura Foster, PT Physical Therapist         Time Calculation:   PT Charges     Row Name 09/11/19 0930             Time Calculation    Start Time  0910  -      Stop Time  0919  -      Time Calculation (min)  9 min  -KELBY      PT Received On  09/11/19  -KELBY      PT - Next Appointment  09/12/19  -         Time Calculation- PT    Total Timed Code Minutes- PT  9 minute(s)  -        User Key  (r) = Recorded By, (t) = Taken By, (c) = Cosigned By    Initials Name Provider Type    Poppy Trevino, PT Physical Therapist        Therapy Charges for Today     Code Description Service Date Service Provider Modifiers Qty    46738459053 HC PT THER PROC EA 15 MIN 9/11/2019 Poppy Rosa, PT GP 1          PT G-Codes  Outcome Measure Options: AM-PAC 6 Clicks Basic Mobility (PT)  AM-PAC 6 Clicks Score (PT): 20  AM-PAC 6 Clicks Score (OT): 18    Poppy Rosa, PT  9/11/2019

## 2019-09-11 NOTE — PROGRESS NOTES
The patient continues to exhibit appropriate behavior and according to speech pathology is actually beginning to show a bit of improvement in her apraxia and aphasia.  We are nearing placement.

## 2019-09-12 LAB
GLUCOSE BLDC GLUCOMTR-MCNC: 119 MG/DL (ref 70–130)
GLUCOSE BLDC GLUCOMTR-MCNC: 123 MG/DL (ref 70–130)
GLUCOSE BLDC GLUCOMTR-MCNC: 83 MG/DL (ref 70–130)
GLUCOSE BLDC GLUCOMTR-MCNC: 96 MG/DL (ref 70–130)

## 2019-09-12 PROCEDURE — 82962 GLUCOSE BLOOD TEST: CPT

## 2019-09-12 PROCEDURE — 97110 THERAPEUTIC EXERCISES: CPT

## 2019-09-12 RX ADMIN — APIXABAN 5 MG: 5 TABLET, FILM COATED ORAL at 21:08

## 2019-09-12 RX ADMIN — OLANZAPINE 2.5 MG: 2.5 TABLET, FILM COATED ORAL at 21:08

## 2019-09-12 RX ADMIN — NEBIVOLOL HYDROCHLORIDE 20 MG: 10 TABLET ORAL at 08:55

## 2019-09-12 RX ADMIN — PAROXETINE HYDROCHLORIDE 10 MG: 10 TABLET, FILM COATED ORAL at 08:57

## 2019-09-12 RX ADMIN — PANTOPRAZOLE SODIUM 40 MG: 40 TABLET, DELAYED RELEASE ORAL at 17:15

## 2019-09-12 RX ADMIN — BRIMONIDINE TARTRATE 1 DROP: 2 SOLUTION OPHTHALMIC at 21:08

## 2019-09-12 RX ADMIN — DORZOLAMIDE HYDROCHLORIDE 1 DROP: 20 SOLUTION/ DROPS OPHTHALMIC at 21:08

## 2019-09-12 RX ADMIN — AMLODIPINE BESYLATE 5 MG: 5 TABLET ORAL at 08:51

## 2019-09-12 RX ADMIN — ERGOCALCIFEROL 50000 UNITS: 1.25 CAPSULE ORAL at 08:57

## 2019-09-12 RX ADMIN — ASPIRIN 325 MG: 325 TABLET ORAL at 08:51

## 2019-09-12 RX ADMIN — BRIMONIDINE TARTRATE 1 DROP: 2 SOLUTION OPHTHALMIC at 08:58

## 2019-09-12 RX ADMIN — POTASSIUM CHLORIDE 20 MEQ: 750 CAPSULE, EXTENDED RELEASE ORAL at 08:56

## 2019-09-12 RX ADMIN — APIXABAN 5 MG: 5 TABLET, FILM COATED ORAL at 08:56

## 2019-09-12 RX ADMIN — ATORVASTATIN CALCIUM 80 MG: 80 TABLET, FILM COATED ORAL at 21:08

## 2019-09-12 RX ADMIN — HYDRALAZINE HYDROCHLORIDE 25 MG: 25 TABLET ORAL at 08:58

## 2019-09-12 RX ADMIN — OLANZAPINE 2.5 MG: 2.5 TABLET, FILM COATED ORAL at 08:52

## 2019-09-12 RX ADMIN — DEXTROMETHORPHAN HYDROBROMIDE AND QUINIDINE SULFATE 1 CAPSULE: 20; 10 CAPSULE, GELATIN COATED ORAL at 21:08

## 2019-09-12 RX ADMIN — LORAZEPAM 0.5 MG: 0.5 TABLET ORAL at 21:08

## 2019-09-12 RX ADMIN — PANTOPRAZOLE SODIUM 40 MG: 40 TABLET, DELAYED RELEASE ORAL at 08:55

## 2019-09-12 RX ADMIN — METFORMIN HYDROCHLORIDE 1000 MG: 1000 TABLET ORAL at 08:52

## 2019-09-12 RX ADMIN — DORZOLAMIDE HYDROCHLORIDE 1 DROP: 20 SOLUTION/ DROPS OPHTHALMIC at 08:58

## 2019-09-12 RX ADMIN — LORAZEPAM 0.5 MG: 0.5 TABLET ORAL at 08:53

## 2019-09-12 RX ADMIN — DEXTROMETHORPHAN HYDROBROMIDE AND QUINIDINE SULFATE 1 CAPSULE: 20; 10 CAPSULE, GELATIN COATED ORAL at 08:53

## 2019-09-12 RX ADMIN — CLOPIDOGREL 75 MG: 75 TABLET, FILM COATED ORAL at 08:53

## 2019-09-12 RX ADMIN — HYDRALAZINE HYDROCHLORIDE 25 MG: 25 TABLET ORAL at 21:08

## 2019-09-12 RX ADMIN — GABAPENTIN 100 MG: 100 CAPSULE ORAL at 08:58

## 2019-09-12 RX ADMIN — Medication 3 MG: at 21:08

## 2019-09-12 RX ADMIN — GABAPENTIN 100 MG: 100 CAPSULE ORAL at 21:09

## 2019-09-12 RX ADMIN — LOSARTAN POTASSIUM 100 MG: 100 TABLET, FILM COATED ORAL at 17:15

## 2019-09-12 RX ADMIN — METFORMIN HYDROCHLORIDE 1000 MG: 1000 TABLET ORAL at 17:15

## 2019-09-12 NOTE — PROGRESS NOTES
Continued Stay Note  [facility]     Patient Name: Darby Workman  MRN: [id number]  Today's Date: 9/12/2019    Admit Date: 8/29/2019    Discharge Plan     Row Name 09/12/19 1520       Plan    Plan  [facility] pending insurance percert    Plan Comments  Per Andrew with [employer] they can not accept pt at this time.  Spoke with pt's cristina Ascencio and pt's  Ajit who are both agreeable to pt going for skilled rehab at [facility].   Dagmar with [facility] stated that pt has been approved pending insruance precert and family completing admission forms.  Dagmar stated that insurance precert was started 9/11/19.  Pt's family aware transfer possible 9/13/19 if precert recieved.          Discharge Codes    No documentation.             CARMELO Oliva

## 2019-09-12 NOTE — PLAN OF CARE
Problem: Patient Care Overview  Goal: Plan of Care Review  Outcome: Ongoing (interventions implemented as appropriate)   09/12/19 0400 09/12/19 0559   Plan of Care Review   Progress --  no change   OTHER   Outcome Summary --  Pt has remained in bed most of shift. Confused at times. Speech difficult to discern. Pt was cooperative with care and compliant with medications. Remained continent through the night. Gait unsteady. Will continue to monitor.    Coping/Psychosocial   Plan of Care Reviewed With patient --    Coping/Psychosocial   Patient Agreement with Plan of Care agrees --        Problem: Overarching Goals (Adult)  Goal: Adheres to Safety Considerations for Self and Others  Outcome: Ongoing (interventions implemented as appropriate)   09/12/19 0559   Overarching Goals (Adult)   Adheres to Safety Considerations for Self and Others making progress toward outcome     Intervention: Develop and Maintain Individualized Safety Plan   08/30/19 1400 09/12/19 0400   Develop and Maintain Individualized Safety Plan   Safety Measures --  safety rounds completed   Violence Risk   Feels Like Hurting Others --  no   Previous Attempt to Harm Others --  no   Precipitating Factors (Confusion) --    C-SSRS (Screen-Recent) Past Month   Q1 Wished to be Dead (Past Month) --  no   Q2 Suicidal Thoughts (Past Month) --  no   Q6 Suicide Behavior (Lifetime) --  no   Level of Risk per Screen --  screen negative       Goal: Optimized Coping Skills in Response to Life Stressors  Outcome: Ongoing (interventions implemented as appropriate)   09/12/19 0559   Overarching Goals (Adult)   Optimized Coping Skills in Response to Life Stressors making progress toward outcome     Intervention: Promote Effective Coping Strategies   09/12/19 0400   Coping/Psychosocial Interventions   Supportive Measures active listening utilized;verbalization of feelings encouraged       Goal: Develops/Participates in Therapeutic [place] to Support Successful  Transition  Outcome: Ongoing (interventions implemented as appropriate)   09/12/19 0559   Overarching Goals (Adult)   Develops/Participates in Therapeutic [place] to Support Successful Transition making progress toward outcome     Intervention: Foster Therapeutic [place]   09/12/19 0400   Interventions   Trust Relationship/Rapport care explained;thoughts/feelings acknowledged         Problem: Social/Occupational/Functional Impairment (Depressive Signs/Symptoms) (Adult)  Goal: Improved Social/Occupational/Functional Skills (Depressive Signs/Symptoms)  Outcome: Ongoing (interventions implemented as appropriate)   09/12/19 0559   Improved Social/Occupational/Functional Skills (Depressive Signs/Symptoms)   Improved Social/Occupational/Functional Skills Time Frame for Action Step (STG) 2 days   Improved Social/Occupational/Functional Skills Action Step (STG) Outcome making progress toward outcome       Problem: Cognitive Impairment (Dementia Signs/Symptoms) (Adult)  Goal: Optimized Cognitive Function (Dementia Signs/Symptoms)  Outcome: Ongoing (interventions implemented as appropriate)   09/12/19 0559   Optimized Cognitive Function (Dementia Signs/Symptoms)   Optimized Cognitive Ability Time Frame for Action Step (STG) 1 day   Optimized Cognitive Ability Action Step (STG) Outcome making progress toward outcome       Problem: Skin Injury Risk (Adult)  Goal: Skin Health and Integrity  Outcome: Ongoing (interventions implemented as appropriate)   09/12/19 0559   Skin Injury Risk (Adult)   Skin Health and Integrity making progress toward outcome       Problem: Fall Risk (Adult)  Goal: Absence of Fall  Outcome: Ongoing (interventions implemented as appropriate)   09/12/19 0559   Fall Risk (Adult)   Absence of Fall making progress toward outcome

## 2019-09-12 NOTE — PROGRESS NOTES
The patient remains pleasant and cooperative with care.  There have been no behavioral issues in several days and we await word regarding disposition.

## 2019-09-12 NOTE — PLAN OF CARE
Problem: Patient Care Overview  Goal: Plan of Care Review  Outcome: Ongoing (interventions implemented as appropriate)   09/12/19 0108   OTHER   Outcome Summary Pt demonstrated improved gait using rolling walker. Pt pleasant & cooperative.   Coping/Psychosocial   Plan of Care Reviewed With patient

## 2019-09-12 NOTE — PROGRESS NOTES
[facility]    Physicians Statement of Medical Necessity for Ambulance Transportation    It is medically necessary for:    Patient Name: Darby Workman    Insurance Information:      To be transported by ambulance:    From (if nursing facility, specify level of care: skilled, senior care, etc):[facility]    To (specify level of care if nursing facility):     Date of Service:     For dialysis patients state date dialysis began: N/A    Diagnosis: CVA, Major depressive disorder single episode  with history of behavior disturbance      Past Medical/Surgical History:  Past Medical History:   Diagnosis Date   • Acute cerebrovascular accident (CVA) of cerebellum (CMS/[facility])    • Acute ischemic stroke (CMS/[facility])    • Arthritis    • Coronary artery disease    • CVA (cerebral vascular accident) (CMS/[facility])    • Diabetes mellitus (CMS/[facility])    • Heart attack (CMS/[facility])    • History of blood clots    • Hyperlipidemia    • Hypertension    • Vision loss       Past Surgical History:   Procedure Laterality Date   • CAROTID ENDARTERECTOMY Left 7/17/2019    Procedure: Left carotid endarterectomy;  Surgeon: Rupert Oliva MD;  Location: [facility] MAIN OR;  Service: Neurosurgery   • EMBOLECTOMY Left 7/17/2019    Procedure: CEREBRAL ANGIOGRAM, LEFT INTERNAL CAROTID ARTERY STENT;  Surgeon: Rupert Oliva MD;  Location: [facility] OR 18/19;  Service: Neurosurgery        Current Objective Medical Evidence(including physical exam finding to support reason for limitations):        Other:    Physician Signature:           (RN,NP,PA,CAN, Discharge Planner) Date/Time:      Printed Name:    __________________________________    [facility]   Phone: [phone] Phone: [phone]   Fax: [phone] Fax: [phone]

## 2019-09-12 NOTE — PLAN OF CARE
Problem: Patient Care Overview  Goal: Plan of Care Review  Outcome: Ongoing (interventions implemented as appropriate)   09/12/19 0400 09/12/19 0904 09/12/19 1526   Plan of Care Review   Progress --  --  improving   OTHER   Outcome Summary --  --  Pt calm and compliant with meds and unit routine. Denies pain, depression, anxiety. Continues to display frustration with aphasia/difficulty communicating. Pt currently in day room watching tv with peers. Wiill continue to monitor and document as needed.    Coping/Psychosocial   Plan of Care Reviewed With --  patient --    Coping/Psychosocial   Patient Agreement with Plan of Care agrees --  --        Problem: Overarching Goals (Adult)  Goal: Adheres to Safety Considerations for Self and Others  Outcome: Ongoing (interventions implemented as appropriate)    Goal: Optimized Coping Skills in Response to Life Stressors  Outcome: Ongoing (interventions implemented as appropriate)    Goal: Develops/Participates in Therapeutic [place] to Support Successful Transition  Outcome: Ongoing (interventions implemented as appropriate)      Problem: Decreased Participation/Engagement (Depressive Signs/Symptoms) (Adult)  Goal: Increased Participation/Engagement (Depressive Signs/Symptoms)  Outcome: Ongoing (interventions implemented as appropriate)      Problem: Social/Occupational/Functional Impairment (Depressive Signs/Symptoms) (Adult)  Goal: Improved Social/Occupational/Functional Skills (Depressive Signs/Symptoms)  Outcome: Ongoing (interventions implemented as appropriate)      Problem: Psychomotor Impairment (Depressive Signs/Symptoms) (Adult)  Goal: Improved Psychomotor Symptoms (Depressive Signs/Symptoms)  Outcome: Ongoing (interventions implemented as appropriate)      Problem: Cognitive Impairment (Dementia Signs/Symptoms) (Adult)  Goal: Optimized Cognitive Function (Dementia Signs/Symptoms)  Outcome: Ongoing (interventions implemented as appropriate)      Problem: Behavioral  Impairment (Dementia Signs/Symptoms) (Adult)  Goal: Improved Behavioral Control (Dementia Signs/Symptoms)  Outcome: Ongoing (interventions implemented as appropriate)      Problem: Psychological Impairment (Dementia Signs/Symptoms) (Adult)  Goal: Improved Psychological Symptoms (Dementia Signs/Symptoms)  Outcome: Ongoing (interventions implemented as appropriate)   09/12/19 1526   Improved Psychological Symptoms (Dementia Signs/Symptoms)   Improved Psychological Symptoms Action Step/Short Term Goal (STG) Established 09/12/19   Improved Psychologic Symptoms Time Frame for Action Step (STG) 4 days   Improved Psychological Symptoms Action Step (STG) Outcome making progress toward outcome       Problem: Skin Injury Risk (Adult)  Goal: Skin Health and Integrity  Outcome: Ongoing (interventions implemented as appropriate)      Problem: Fall Risk (Adult)  Goal: Absence of Fall  Outcome: Ongoing (interventions implemented as appropriate)

## 2019-09-12 NOTE — THERAPY TREATMENT NOTE
Acute Care - Physical Therapy Treatment Note  [facility]     Patient Name: Darby Workman  : 1945  MRN: [id number]  Today's Date: 2019             Admit Date: 2019    Visit Dx:  No diagnosis found.  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)   • Right-sided headache   • Acute ischemic stroke (CMS/HCC)   • Embolic stroke (CMS/HCC)   • Anticoagulated by anticoagulation treatment   • Stroke (cerebrum) (CMS/HCC)   • Dysthymic disorder   • Hypotension       Therapy Treatment    Rehabilitation Treatment Summary     Row Name 19 1544             Treatment Time/Intention    Discipline  physical therapist  -EF      Document Type  therapy note (daily note)  -EF      Subjective Information  no complaints  -EF      Mode of Treatment  physical therapy  -EF      Patient/Family Observations  pt reclined in chair in common area, sleeping but woke easily  -EF      Patient Effort  good  -EF      Existing Precautions/Restrictions  fall contact  -EF      Recorded by [EF] Vivian Acosta, PT 19 1551      Row Name 19 1544             Bed Mobility Assessment/Treatment    Comment (Bed Mobility)  not tested-up in chair  -EF      Recorded by [EF] Vivian Acosta, PT 19 1551      Row Name 19 1544             Sit-Stand Transfer    Sit-Stand Sarah Ann (Transfers)  contact guard;minimum assist (75% patient effort);verbal cues  -EF      Assistive Device (Sit-Stand Transfers)  walker, front-wheeled  -EF      Recorded by [EF] Vivian Acosta, PT 19 1551      Row Name 19 1544             Stand-Sit Transfer    Stand-Sit Sarah Ann (Transfers)  contact guard;verbal cues  -EF      Assistive Device (Stand-Sit Transfers)  walker, front-wheeled  -EF      Recorded by [EF] Vivian Acosta, PT 19  1551      Row Name 09/12/19 1544             Gait/Stairs Assessment/Training    [place] Level (Gait)  contact guard  -EF      Assistive Device (Gait)  walker, front-wheeled  -EF      Distance in Feet (Gait)  100  -EF      Deviations/Abnormal Patterns (Gait)  festinating/shuffling;gait speed decreased;stride length decreased;bilateral deviations  -EF      Bilateral Gait Deviations  forward flexed posture;heel strike decreased;weight shift ability decreased  -EF      Recorded by [EF] Vivian Acosta, PT 09/12/19 1551      Row Name 09/12/19 1544             Lower Extremity Seated Therapeutic Exercise    Comment, Seated Lower Extremity (Therapeutic Exercise)  sitting LAQ x 10 reps each  -EF      Recorded by [EF] Vivian Acosta, PT 09/12/19 1551      Row Name 09/12/19 1544             Static Standing Balance    Level of [place] (Supported Standing, Static Balance)  contact guard assist  -EF      Assistive Device Utilized (Supported Standing, Static Balance)  walker, rolling  -EF      Recorded by [EF] Vivian Acosta, PT 09/12/19 1551      Row Name 09/12/19 1544             Positioning and Restraints    Pre-Treatment Position  sitting in chair/recliner  -EF      In Chair  sitting;patient within staff view  -EF      Recorded by [EF] Vivian Acosta, PT 09/12/19 1551      Row Name 09/12/19 1544             Pain Scale: FACES Pre/Post-Treatment    Pain: FACES Scale, Pretreatment  0-->no hurt  -EF      Pain: FACES Scale, Post-Treatment  0-->no hurt  -EF      Recorded by [EF] Vivian Acosta, PT 09/12/19 1551      Row Name                Wound 07/17/19 1015 Left neck incision    Wound - Properties Group Date first assessed: 07/17/19 [LD] Time first assessed: 1015 [LD] Side: Left [LD] Location: neck [LD] Type: incision [LD] Recorded by:  [LD] Martha Foster RN 07/17/19 1015      User Key  (r) = Recorded By, (t) = Taken By, (c) = Cosigned By    Initials Name Effective Dates Discipline    EF Karla  Vivian EMERSON, PT 06/08/18 -  PT    Martha Samuel RN 06/16/16 -  Nurse          Wound 07/17/19 1015 Left neck incision (Active)   Dressing Appearance open to air 9/12/2019  9:04 AM   Closure Liquid skin adhesive 9/12/2019  9:04 AM   Base dry 9/12/2019  9:04 AM   Periwound intact;dry 9/12/2019  9:04 AM   Periwound Temperature warm 9/12/2019  9:04 AM   Periwound Skin Turgor firm 9/12/2019  9:04 AM   Edges rolled/closed 9/12/2019  9:04 AM   Drainage Amount none 9/12/2019  9:04 AM   Dressing Care, Wound open to air 9/12/2019  9:04 AM           Physical Therapy Education     Title: PT OT SLP Therapies (Not Started)     Topic: Physical Therapy (In Progress)     Point: Mobility training (In Progress)     Learning Progress Summary           Patient Acceptance, E, NR by EF at 9/12/2019  3:52 PM    Acceptance, E, NR by SK at 9/12/2019  5:59 AM    Acceptance, E,TB, VU,NR by KELBY at 9/11/2019  9:28 AM    Acceptance, E,TB, NR by GINNY at 9/10/2019 11:31 AM    Acceptance, E, NR by GREGORY at 9/9/2019  8:24 AM    Acceptance, E, NR by SK at 9/7/2019  4:32 AM    Acceptance, E,TB, NR,DU by KELYB at 9/4/2019  9:55 AM                   Point: Home exercise program (In Progress)     Learning Progress Summary           Patient Acceptance, E, NR by EF at 9/12/2019  3:52 PM    Acceptance, E, NR by SK at 9/12/2019  5:59 AM    Acceptance, E, NR by GREGORY at 9/9/2019  8:24 AM    Acceptance, E, NR by SK at 9/7/2019  4:32 AM                   Point: Body mechanics (In Progress)     Learning Progress Summary           Patient Acceptance, E, NR by SHARON at 9/12/2019  3:52 PM    Acceptance, E, NR by SK at 9/12/2019  5:59 AM    Acceptance, E, NR by SK at 9/7/2019  4:32 AM    Acceptance, E, NR by YAKOV at 8/29/2019  7:32 PM                   Point: Precautions (In Progress)     Learning Progress Summary           Patient Acceptance, E, NR by EF at 9/12/2019  3:52 PM    Acceptance, E, NR by SK at 9/12/2019  5:59 AM    Acceptance, E, NR by SK at 9/7/2019  4:32 AM     Acceptance, E, NR by MD at 9/6/2019  9:56 AM    Acceptance, E, NR by MD at 9/3/2019  9:22 AM    Acceptance, E, NR,NL by MD at 8/30/2019  2:29 PM                               User Key     Initials Effective Dates Name Provider Type Discipline    EF 06/08/18 -  Vivian Acosta, PT Physical Therapist PT    KELBY 06/08/18 -  Poppy Rosa, PT Physical Therapist PT    LB 04/03/18 -  Evelina Plaza, PT Physical Therapist PT    LH 04/03/18 -  Laura Foster, PT Physical Therapist PT    MD 04/03/18 -  Mira Chadwick, PT Physical Therapist PT    SK 05/15/17 -  Mary Small RN Registered Nurse Nurse    YAKOV 02/15/18 -  Danilo Rosa RN Registered Nurse Nurse                PT Recommendation and Plan     Plan of Care Reviewed With: patient  Outcome Summary: Pt demonstrated improved gait using rolling walker. Pt pleasant & cooperative.  Outcome Measures     Row Name 09/12/19 1500 09/11/19 0910          How much help from another person do you currently need...    Turning from your back to your side while in flat bed without using bedrails?  4  -EF  4  -KELBY     Moving from lying on back to sitting on the side of a flat bed without bedrails?  4  -EF  4  -KELBY     Moving to and from a bed to a chair (including a wheelchair)?  3  -EF  3  -KELBY     Standing up from a chair using your arms (e.g., wheelchair, bedside chair)?  3  -EF  3  -KELBY     Climbing 3-5 steps with a railing?  3  -EF  3  -KELBY     To walk in hospital room?  3  -EF  3  -KELBY     AM-PAC 6 Clicks Score (PT)  20  -EF  20  -KELBY        Functional Assessment    Outcome Measure Options  AM-PAC 6 Clicks Basic Mobility (PT)  -EF  AM-PAC 6 Clicks Basic Mobility (PT)  -KELBY       User Key  (r) = Recorded By, (t) = Taken By, (c) = Cosigned By    Initials Name Provider Type    EF Vivian Acosta, PT Physical Therapist    Poppy Trevino, PT Physical Therapist         Time Calculation:   PT Charges     Row Name 09/12/19 1554             Time Calculation    Start Time  1400  -EF       Stop Time  1415  -EF      Time Calculation (min)  15 min  -EF      PT Received On  09/12/19  -EF      PT - Next Appointment  09/13/19  -EF        User Key  (r) = Recorded By, (t) = Taken By, (c) = Cosigned By    Initials Name Provider Type    EF Vivian Acosta, PT Physical Therapist        Therapy Charges for Today     Code Description Service Date Service Provider Modifiers Qty    31435295477 HC PT THER PROC EA 15 MIN 9/12/2019 Vivian Acosta PT GP 1          PT G-Codes  Outcome Measure Options: AM-PAC 6 Clicks Basic Mobility (PT)  AM-PAC 6 Clicks Score (PT): 20  AM-PAC 6 Clicks Score (OT): 18    Vivian Acosta PT  9/12/2019

## 2019-09-13 VITALS
SYSTOLIC BLOOD PRESSURE: 90 MMHG | BODY MASS INDEX: 22.01 KG/M2 | RESPIRATION RATE: 16 BRPM | OXYGEN SATURATION: 96 % | DIASTOLIC BLOOD PRESSURE: 53 MMHG | HEART RATE: 78 BPM | WEIGHT: 132.28 LBS | TEMPERATURE: 96.5 F

## 2019-09-13 LAB
GLUCOSE BLDC GLUCOMTR-MCNC: 132 MG/DL (ref 70–130)
GLUCOSE BLDC GLUCOMTR-MCNC: 137 MG/DL (ref 70–130)

## 2019-09-13 PROCEDURE — 82962 GLUCOSE BLOOD TEST: CPT

## 2019-09-13 RX ORDER — LORAZEPAM 0.5 MG/1
0.5 TABLET ORAL 2 TIMES DAILY
Qty: 60 TABLET | Refills: 0 | Status: SHIPPED | OUTPATIENT
Start: 2019-09-13 | End: 2020-04-20 | Stop reason: HOSPADM

## 2019-09-13 RX ORDER — CLOPIDOGREL BISULFATE 75 MG/1
75 TABLET ORAL DAILY
Qty: 30 TABLET | Refills: 0 | Status: SHIPPED | OUTPATIENT
Start: 2019-09-14 | End: 2020-04-20 | Stop reason: HOSPADM

## 2019-09-13 RX ORDER — LOSARTAN POTASSIUM 100 MG/1
100 TABLET ORAL
Qty: 30 TABLET | Refills: 0 | Status: SHIPPED | OUTPATIENT
Start: 2019-09-13 | End: 2020-04-20 | Stop reason: HOSPADM

## 2019-09-13 RX ORDER — PAROXETINE 10 MG/1
10 TABLET, FILM COATED ORAL DAILY
Qty: 30 TABLET | Refills: 0 | Status: SHIPPED | OUTPATIENT
Start: 2019-09-14

## 2019-09-13 RX ORDER — OLANZAPINE 2.5 MG/1
60 TABLET ORAL 2 TIMES DAILY
Qty: 60 TABLET | Refills: 1 | Status: SHIPPED | OUTPATIENT
Start: 2019-09-13 | End: 2020-04-20 | Stop reason: HOSPADM

## 2019-09-13 RX ORDER — GABAPENTIN 100 MG/1
100 CAPSULE ORAL 2 TIMES DAILY
Qty: 60 CAPSULE | Refills: 1 | Status: ON HOLD | OUTPATIENT
Start: 2019-09-13 | End: 2020-04-20 | Stop reason: SDUPTHER

## 2019-09-13 RX ADMIN — BRIMONIDINE TARTRATE 1 DROP: 2 SOLUTION OPHTHALMIC at 10:18

## 2019-09-13 RX ADMIN — APIXABAN 5 MG: 5 TABLET, FILM COATED ORAL at 09:51

## 2019-09-13 RX ADMIN — ASPIRIN 325 MG: 325 TABLET ORAL at 10:18

## 2019-09-13 RX ADMIN — DORZOLAMIDE HYDROCHLORIDE 1 DROP: 20 SOLUTION/ DROPS OPHTHALMIC at 10:19

## 2019-09-13 RX ADMIN — AMLODIPINE BESYLATE 5 MG: 5 TABLET ORAL at 10:01

## 2019-09-13 RX ADMIN — GABAPENTIN 100 MG: 100 CAPSULE ORAL at 09:52

## 2019-09-13 RX ADMIN — LORAZEPAM 0.5 MG: 0.5 TABLET ORAL at 09:52

## 2019-09-13 RX ADMIN — PANTOPRAZOLE SODIUM 40 MG: 40 TABLET, DELAYED RELEASE ORAL at 07:01

## 2019-09-13 RX ADMIN — OLANZAPINE 2.5 MG: 2.5 TABLET, FILM COATED ORAL at 09:50

## 2019-09-13 RX ADMIN — DEXTROMETHORPHAN HYDROBROMIDE AND QUINIDINE SULFATE 1 CAPSULE: 20; 10 CAPSULE, GELATIN COATED ORAL at 09:51

## 2019-09-13 RX ADMIN — PAROXETINE HYDROCHLORIDE 10 MG: 10 TABLET, FILM COATED ORAL at 09:51

## 2019-09-13 RX ADMIN — HYDRALAZINE HYDROCHLORIDE 25 MG: 25 TABLET ORAL at 07:01

## 2019-09-13 RX ADMIN — METFORMIN HYDROCHLORIDE 1000 MG: 1000 TABLET ORAL at 09:51

## 2019-09-13 RX ADMIN — POTASSIUM CHLORIDE 20 MEQ: 750 CAPSULE, EXTENDED RELEASE ORAL at 09:50

## 2019-09-13 RX ADMIN — CLOPIDOGREL 75 MG: 75 TABLET, FILM COATED ORAL at 09:52

## 2019-09-13 RX ADMIN — NEBIVOLOL HYDROCHLORIDE 20 MG: 10 TABLET ORAL at 10:01

## 2019-09-13 NOTE — DISCHARGE SUMMARY
DATES OF ADMISSION: 8/29/2019-9/13/2019    REASON FOR ADMISSION: The patient is a 74-year-old white female who is status post acute cerebrovascular accident which is led to a aphasia and apraxia admitted after she had exhibited some behavioral disturbance while on the rehabilitation unit at this facility.    LABS: See chart    HOSPITAL COURSE: Patient was admitted to the crisis management unit and placed on Foster 2 programming.  She was continued on her previously prescribed fluoxetine and Nuedexta was added with dosage titration to twice daily dosing.  Additionally the patient continue on previously prescribed low-dose Ativan and Neurontin to address anxiety.  She did have some behavioral issues and required a Posey restraint on a couple of occasions early in her hospitalization leading to the addition of Zyprexa 2.5 mg twice daily.  The patient showed steady improvement with this medication regimen.  By 9/13/2019 arrangements have been made for placement in a skilled nursing/rehabilitation facility and discharge was ordered.    FINAL DIAGNOSIS: Vascular dementia with behavioral disturbance, status post CVA, hyperlipidemia, hypertension, diabetes mellitus    DISPOSITION ON DISCHARGE: A full listing of the patient's medications is provided below.  Follow-up to take place with psychiatric providers at the patient's placement facility.    PROGNOSIS: Fair       Discharge Medications      New Medications      Instructions Start Date   dextromethorphan-quinidine 20-10 MG capsule capsule  Commonly known as:  NUEDEXTA   1 capsule, Oral, 2 Times Daily      gabapentin 100 MG capsule  Commonly known as:  NEURONTIN   100 mg, Oral, 2 Times Daily      LORazepam 0.5 MG tablet  Commonly known as:  ATIVAN   0.5 mg, Oral, 2 Times Daily      losartan 100 MG tablet  Commonly known as:  COZAAR   100 mg, Oral, Every 24 Hours Scheduled      OLANZapine 2.5 MG tablet  Commonly known as:  zyPREXA  Replaces:  OLANZapine 10 MG injection   60  mg, Oral, 2 Times Daily         Continue These Medications      Instructions Start Date   aluminum-magnesium hydroxide-simethicone 400-400-40 MG/5ML suspension  Commonly known as:  MAALOX MAX   15 mL, Oral, Every 6 Hours PRN      amLODIPine 5 MG tablet  Commonly known as:  NORVASC   5 mg, Oral, 2 Times Daily - RT      apixaban 5 MG tablet tablet  Commonly known as:  ELIQUIS   5 mg, Oral, Every 12 Hours Scheduled      aspirin 325 MG tablet   325 mg, Oral, Daily      atorvastatin 80 MG tablet  Commonly known as:  LIPITOR   80 mg, Oral, Nightly      brimonidine 0.2 % ophthalmic solution  Commonly known as:  ALPHAGAN   1 drop, Both Eyes, 2 Times Daily      clopidogrel 75 MG tablet  Commonly known as:  PLAVIX   75 mg, Oral, Daily   Start Date:  9/14/2019     dorzolamide 2 % ophthalmic solution  Commonly known as:  TRUSOPT   1 drop, Both Eyes, 2 Times Daily      glipiZIDE 5 MG tablet  Commonly known as:  GLUCOTROL   5 mg, Oral, 2 Times Daily Before Meals      hydrALAZINE 50 MG tablet  Commonly known as:  APRESOLINE   50 mg, Oral, Every 8 Hours Scheduled      insulin lispro 100 UNIT/ML injection  Commonly known as:  humaLOG   0-7 Units, Subcutaneous, 4 Times Daily With Meals & Nightly      losartan 50 MG tablet 100 mg, hydrochlorothiazide 12.5 MG capsule 12.5 mg   1 dose, Oral, Daily      melatonin 3 MG tablet   3 mg, Oral, Nightly      metFORMIN 1000 MG tablet  Commonly known as:  GLUCOPHAGE   1,000 mg, Oral, 2 Times Daily With Meals      nebivolol 20 MG tablet  Commonly known as:  BYSTOLIC   20 mg, Oral, 2 Times Daily      nitroglycerin 0.4 MG SL tablet  Commonly known as:  NITROSTAT   0.4 mg, Sublingual, Every 5 Minutes PRN, Max 3 doses in 15 minutes.      pantoprazole 40 MG EC tablet  Commonly known as:  PROTONIX   40 mg, Oral, 2 Times Daily Before Meals      PARoxetine 10 MG tablet  Commonly known as:  PAXIL   10 mg, Oral, Daily   Start Date:  9/14/2019     potassium chloride 20 MEQ packet  Commonly known as:   KLOR-CON   20 mEq, Oral, Daily With Breakfast      vitamin D 03601 units capsule capsule  Commonly known as:  ERGOCALCIFEROL   50,000 Units, Oral, Every 7 Days         Stop These Medications    acetaminophen 500 MG tablet  Commonly known as:  TYLENOL     dextrose 40 % gel  Commonly known as:  GLUTOSE     dextrose 50 % solution  Commonly known as:  D50W     glucagon (human recombinant) 1 MG injection  Commonly known as:  GLUCAGEN DIAGNOSTIC     OLANZapine 10 MG injection  Commonly known as:  zyPREXA  Replaced by:  OLANZapine 2.5 MG tablet

## 2019-09-13 NOTE — PROGRESS NOTES
Continued Stay Note  [facility]     Patient Name: Darby Workman  MRN: [id number]  Today's Date: 9/13/2019    Admit Date: 8/29/2019    Discharge Plan     Row Name 09/13/19 1047       Plan    Plan  [facility]    Plan Comments  Per Dagmar with [facility] pt's insurance precert approved for transfer to [facility] today.   Pt with DC orders per Dr. Heaton.  Spoke with pt's  Ajit who stated that he will be meeting  [facility] admissions rep Arpita in pt's room today at 2 to sign paperwrk for admission.  Pt's ambulance is scheduled with [facility] today at 3:00.  Pt's transfer packet completed and will be sent with pt. Pt's RN will call report prior to D/C.          Discharge Codes    No documentation.       Expected Discharge Date and Time     Expected Discharge Date Expected Discharge Time    Sep 13, 2019             CARMELO Oliva

## 2019-09-13 NOTE — PROGRESS NOTES
Case Management Discharge Note    Final Note: [facility]    Destination      No service has been selected for the patient.      Durable Medical Equipment      No service has been selected for the patient.      Dialysis/Infusion      No service has been selected for the patient.      Home Medical Care      No service has been selected for the patient.      Therapy      No service has been selected for the patient.      Community Resources      No service has been selected for the patient.             Final Discharge Disposition Code: 03 - skilled nursing facility (SNF)

## 2019-09-17 ENCOUNTER — TELEPHONE (OUTPATIENT)
Dept: PSYCHIATRY | Facility: HOSPITAL | Age: 74
End: 2019-09-17

## 2019-11-07 NOTE — PLAN OF CARE
Gabepentin  promethazine    [facility]    Seen 8/26/19  sched 1/7/20  Labs 8/2019   Problem: Patient Care Overview  Goal: Plan of Care Review   09/09/19 2040   Plan of Care Review   Progress improving   OTHER   Outcome Summary Patient has been more cooperative this shift. Patient has been visible in the milieu. Patient  voiced a desire to take her home possibly with home health. No distress noted this shift.   Coping/Psychosocial   Plan of Care Reviewed With patient;spouse   Coping/Psychosocial   Patient Agreement with Plan of Care agrees       Problem: Overarching Goals (Adult)  Goal: Develops/Participates in Therapeutic [place] to Support Successful Transition    Intervention: Foster Therapeutic [place]   09/09/19 1800   Interventions   Trust Relationship/Rapport care explained;choices provided;emotional support provided;empathic listening provided;questions answered;questions encouraged;reassurance provided;thoughts/feelings acknowledged         Problem: Decreased Participation/Engagement (Depressive Signs/Symptoms) (Adult)  Intervention: Facilitate Participation and Engagement   09/09/19 2040   Activity   Activity activity encouraged   Facilitate Participation and Engagement   Mutually Determined Action Steps (Facilitate Participation and Engagement) voluntarily attends group therapy         Problem: Behavioral Impairment (Dementia Signs/Symptoms) (Adult)  Intervention: Manage Behavior and Mood   09/09/19 2040   Manage Behavior and Mood   Mutually Determined Action Steps (Manage Behavior/Mood) eats at meal time;maintains regular elimination   Facilitate Re-Engagement and Participation   Diversional Activity television         Problem: Skin Injury Risk (Adult)  Intervention: Prevent/Manage Excess Moisture   09/09/19 2040   Hygiene Care   Perineal Care absorbent pad changed;perineum cleansed   Bathing/Skin Care dressed/undressed   Skin Interventions   Skin Protection incontinence pads utilized         Problem: Fall Risk (Adult)  Intervention: Monitor/Assist with Self Care   09/09/19 2040    Activity   Activity Assistance Provided assistance, 1 person   Daily Care Interventions   Self-Care Promotion independence encouraged

## 2019-11-19 NOTE — THERAPY TREATMENT NOTE
Inpatient Rehabilitation - Occupational Therapy Treatment Note    [facility]     Patient Name: Darby Workman  : 1945  MRN: [id number]    Today's Date: 2019                 Admit Date: 2019      Visit Dx:  No diagnosis found.    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)   • Right-sided headache   • Acute ischemic stroke (CMS/HCC)   • Embolic stroke (CMS/HCC)   • Cerebral infarction due to stenosis of left carotid artery (CMS/HCC)   • Anticoagulated by anticoagulation treatment   • Stroke (cerebrum) (CMS/HCC)         Therapy Treatment    IRF Treatment Summary     Row Name 19 1506 19 1100 19 0930       Evaluation/Treatment Time and Intent    Subjective Information  no complaints  -AF  --  no complaints  -KB    Existing Precautions/Restrictions  fall  -AF  fall communication, swallow  -KB  fall communication, swallow  -KB    Document Type  therapy note (daily note)  -AF  therapy note (daily note)  -KB  therapy note (daily note)  -KB    Mode of Treatment  occupational therapy  -AF  speech-language pathology  -KB  speech-language pathology  -KB    Patient/Family Observations  sitting up in w/c in dining room in AM after breakfast, sitting up in w/c in room with  present increased participation noted with  present   -AF  patient was not able to effectively communicate her wants/needs but refused to go to therapy office by planting her heels while rolling in wc down the [place]; tx session completed in her room  -KB  assisted patient from bed to ; agreeable to   -KB    Start Time (Evaluation/Treatment)  --  1100  -KB  0930  -KB    Stop Time (Evaluation/Treatment)  --  1130  -KB  1000  -KB    Recorded by [AF] Ingris Ansari, JOHN [KB] Bruton, Katherine L [KB] Bruton, Katherine L    Row Name  Left message for patient to call clinic.   08/14/19 0900             Evaluation/Treatment Time and Intent    Subjective Information  complains of not wanting to participate in therapy  -KELBY      Existing Precautions/Restrictions  fall aphasic   -KELBY      Document Type  therapy note (daily note)  -KELBY      Mode of Treatment  physical therapy  -KELBY      Patient/Family Observations  Pt refused some exercises and some ambulation  -KELBY      Recorded by [KELBY] Poppy Rosa, PT      Row Name 08/14/19 1506 08/14/19 0900          Cognition/Psychosocial- PT/OT    Affect/Mental Status (Cognitive)  confused  -AF  agitated  -KELBY     Behavioral Issues (Cognitive)  --  uncooperative  -KELBY     Orientation Status (Cognition)  unable/difficult to assess  -AF  unable/difficult to assess  -KELBY     Follows Commands (Cognition)  25-49% accuracy;delayed response/completion;increased processing time needed;repetition of directions required;verbal cues/prompting required increased participation noted when  present   -AF  follows one step commands;0-24% accuracy;25-49% accuracy  -KELBY     Personal Safety Interventions  fall prevention program maintained;gait belt;nonskid shoes/slippers when out of bed  -AF  fall prevention program maintained;gait belt;supervised activity  -KELBY     Attention Deficit (Cognitive)  moderate deficit;severe deficit  -AF  severe deficit  -KELBY     Memory Deficit (Cognitive)  unable/difficult to assess  -AF  unable/difficult to assess  -KELBY     Safety Deficit (Cognitive)  insight into deficits/self awareness;awareness of need for assistance  -AF  impulsivity;judgment;insight into deficits/self awareness;safety precautions awareness;awareness of need for assistance;ability to follow commands  -KELBY     Recorded by [AF] Ingris Ansari, SALR [KELBY] Poppy Rosa, PT     Row Name 08/14/19 0900             Bed Mobility Assessment/Treatment    Supine-Sit [place] (Bed Mobility)  supervision;conditional independence  -KELBY      Bed Mobility, Safety Issues   cognitive deficits limit understanding  -KELBY      Recorded by [KELBY] Poppy Rosa, PT      Row Name 08/14/19 1506             Functional Mobility    Functional Mobility- Comment  walked from therapy gym to room without AD SBA and vc's for directions back to the room  -AF      Recorded by [AF] Ingris Ansari OTR      Row Name 08/14/19 0900             Sit-Stand Transfer    Sit-Stand [place] (Transfers)  stand by assist;contact guard  -KELBY      Recorded by [KELBY] Poppy Rosa, PT      Row Name 08/14/19 0900             Stand-Sit Transfer    Stand-Sit [place] (Transfers)  stand by assist;contact guard  -KELBY      Recorded by [KELBY] Poppy Rosa, PT      Row Name 08/14/19 1506             Toilet Transfer    Type (Toilet Transfer)  stand pivot/stand step;sit-stand;stand-sit in Am and PM sessions   -AF      [place] Level (Toilet Transfer)  contact guard;stand by assist  -AF      Assistive Device (Toilet Transfer)  commode;grab bars/safety frame  -AF      Recorded by [AF] Ingris Ansari OTR      Row Name 08/14/19 1506             Shower Transfer    Type (Shower Transfer)  stand-sit;sit-stand  -AF      [place] Level (Shower Transfer)  contact guard  -AF      Assistive Device (Shower Transfer)  grab bars/tub rail;tub bench;wheelchair  -AF      Recorded by [AF] Ingris Ansari, SALR      Row Name 08/14/19 0900             Gait/Stairs Assessment/Training    [place] Level (Gait)  contact guard;stand by assist  -KELBY      Assistive Device (Gait)  -- no device   -KELBY      Distance in Feet (Gait)  300' outdoors on sidewalk, incline; 300', 180', 100' up on rehab unit. Pt was able to find her room when she got close to it.   -KELBY      Deviations/Abnormal Patterns (Gait)  gait speed decreased  -KELBY      Bilateral Gait Deviations  heel strike decreased;forward flexed posture  -KELBY      [place] Level (Stairs)  stand by assist;contact guard  -KELBY      Handrail Location (Stairs)  both sides ascending;  R descending   -KELBY      Number of Steps (Stairs)  4  -KELBY      Ascending Technique (Stairs)  step-over-step  -KELBY      Descending Technique (Stairs)  step-over-step  -KELBY      Stairs, Safety Issues  weight-shifting ability decreased;balance decreased during turns  -KELBY      Stairs, Impairments  strength decreased;sensation decreased;impaired balance  -KELBY      [place] Level (Ramp)  contact guard  -KELBY      Recorded by [KELBY] Poppy Rosa, PT      Row Name 08/14/19 0900             Curb Negotiation (Mobility)    [place], Curb Negotiation  contact guard  -KELBY      Recorded by [KELBY] Poppy Rosa, PT      Row Name 08/14/19 1506             Bathing Assessment/Treatment    Bathing [place] Level  bathing skills;minimum assist (75% patient effort)  -AF      Assistive Device (Bathing)  grab bar/tub rail;hand held shower spray hose;tub bench  -AF      Bathing Position  supported standing;supported sitting  -AF      Comment (Bathing)  vc's and assistance for throughness  -AF      Recorded by [AF] Ingris Ansari OTR      Row Name 08/14/19 1506             Upper Body Dressing Assessment/Treatment    Upper Body Dressing Task  upper body dressing skills;minimum assist (75% or more patient effort)  -AF      Upper Body Dressing Position  supported sitting  -AF      Comment (Upper Body Dressing)  MIN A to fasten bra  -AF      Recorded by [AF] Ingris Ansari OTR      Row Name 08/14/19 1506             Lower Body Dressing Assessment/Treatment    Lower Body Dressing [place] Level  don;doff;pants/bottoms;shoes/slippers;socks;minimum assist (75% patient effort);verbal cues  -AF      Lower Body Dressing Position  supported sitting;supported standing  -AF      Comment (Lower Body Dressing)  assist with buttoning and zipping pants   -AF      Recorded by [AF] Ingris Ansari OTR      Row Name 08/14/19 1506             Grooming Assessment/Treatment    Grooming [place] Level  grooming skills;minimum assist  (75% patient effort);verbal cues;wash face, hands;hair care, combing/brushing;oral care regimen  -AF      Grooming Position  sink side;supported sitting  -AF      Recorded by [AF] Ingris Ansari OTR      Row Name 08/14/19 1506             Toileting Assessment/Treatment    Toileting [place] Level  moderate assist (50% patient effort);verbal cues  -AF      Assistive Device Use (Toileting)  grab bar/safety frame;raised toilet seat  -AF      Toileting Position  unsupported sitting;supported standing  -AF      Comment (Toileting)  pt waering pants iwth buttons and zipper and required assistance with task  -AF      Recorded by [AF] Ingris Ansari OTR      Row Name 08/14/19 1506 08/14/19 1100 08/14/19 0930       Pain Scale: Numbers Pre/Post-Treatment    Pain Scale: Numbers, Pretreatment  0/10 - no pain  -AF  0/10 - no pain  -KB  0/10 - no pain  -KB    Pain Scale: Numbers, Post-Treatment  0/10 - no pain  -AF  0/10 - no pain  -KB  0/10 - no pain  -KB    Recorded by [AF] Ingris Ansari, OTR [KB] Bruton, Katherine L [KB] Bruton, Katherine L    Row Name 08/14/19 0900             Pain Scale: FACES Pre/Post-Treatment    Pain: FACES Scale, Pretreatment  0-->no hurt pt pointed to this face on her own   -KELBY      Pain: FACES Scale, Post-Treatment  0-->no hurt  -KELBY      Recorded by [KELBY] Poppy Rosa, PT      Row Name 08/14/19 0900             Standing Balance Activity    Comment (Standing, Balance Training)  Pt refused to participate in ball catch.   -KELBY      Recorded by [KELBY] Poppy Rosa, PT      Row Name 08/14/19 1506             Upper Extremity Seated Therapeutic Exercise    Performed, Seated Upper Extremity (Therapeutic Exercise)  scapular protraction/retraction;shoulder flexion/extension  -AF      Device, Seated Upper Extremity (Therapeutic Exercise)  -- #2 dowel deepthi, hand gripper  -AF      Exercise Type, Seated Upper Extremity (Therapeutic Exercise)  resistive exercise;AROM (active range of motion)  -AF       Expected Outcomes, Seated Upper Extremity (Therapeutic Exercise)  improve functional tolerance, self-care activity  -AF      Sets/Reps Detail, Seated Upper Extremity (Therapeutic Exercise)  2/15  -AF      Comment, Seated Upper Extremity (Therapeutic Exercise)  with constant cues for technqiues and reps  -AF      Recorded by [AF] Ingris Ansari OTKEVIN      Row Name 08/14/19 0900             Lower Extremity Standing Therapeutic Exercise    Performed, Standing Lower Extremity (Therapeutic Exercise)  heel raises;mini-squats  -KELBY      Device, Standing Lower Extremity (Therapeutic Exercise)  other (see comments) window sill in her room  -KELBY      Exercise Type, Standing Lower Extremity (Therapeutic Exercise)  AROM (active range of motion)  -KELBY      Sets/Reps Detail, Standing Lower Extremity (Therapeutic Exercise)  1/8  -KELBY      Comment, Standing Lower Extremity (Therapeutic Exercise)  required demonstration to follow commands   -KELBY      Recorded by [KELBY] Poppy Rosa, PT      Row Name 08/14/19 0900             Lower Extremity Seated Therapeutic Exercise    Performed, Seated Lower Extremity (Therapeutic Exercise)  LAQ (long arc quad), knee extension  -KELBY      Exercise Type, Seated Lower Extremity (Therapeutic Exercise)  AROM (active range of motion)  -KELBY      Sets/Reps Detail, Seated Lower Extremity (Therapeutic Exercise)  1/10  -KELBY      Comment, Seated Lower Extremity (Therapeutic Exercise)  pt refused more exercises than that.   -KELBY      Recorded by [KELBY] Poppy Rosa, PT      Row Name 08/14/19 1506             Neuromuscular Re-education    Comment (Neuromuscular Re-education)  pt participated in using R hand to manipualte round pegs into peg board by color with MIN difficutly once pt caught on to the task. with 3D block recreation with R hand with 100% color match, 80% accuracy with block recration  -AF      Recorded by [AF] Ingris Ansari OTR      Row Name 08/14/19 1506 08/14/19 0900           Positioning and Restraints    Pre-Treatment Position  sitting in chair/recliner  -AF  in bed  -KELBY     Post Treatment Position  bed  -AF  --     In Bed  supine;notified nsg;call light within reach;encouraged to call for assist;exit alarm on in AM  -AF  --     In Wheelchair  sitting;call light within reach;encouraged to call for assist;exit alarm on;with family/caregiver with  in room in PM  -AF  --     Other Position  --  -- standing with NA -- going to bathroom   -KELBY     Recorded by [AF] Ingris Ansari, OTR [KELBY] Poppy Rosa, PT       User Key  (r) = Recorded By, (t) = Taken By, (c) = Cosigned By    Initials Name Effective Dates    KELBY Poppy Rosa, PT 06/08/18 -     Ingris Youssef, OTR 04/03/18 -     PIETER Edgeton Jenna L 03/07/18 -           Wound 07/17/19 1015 Left neck incision (Active)   Dressing Appearance open to air 8/14/2019  7:45 AM   Closure Liquid skin adhesive 8/14/2019  7:45 AM   Base dry;clean 8/14/2019  7:45 AM   Edges rolled/closed 8/14/2019  7:45 AM   Drainage Amount none 8/14/2019  7:45 AM   Dressing Care, Wound open to air 8/14/2019  7:45 AM         OT Recommendation and Plan    Anticipated Equipment Needs At Discharge (OT Eval): bathing equipment  Planned Therapy Interventions (OT Eval): activity tolerance training, BADL retraining, cognitive/visual perception retraining, functional balance retraining, neuromuscular control/coordination retraining, occupation/activity based interventions, patient/caregiver education/training, ROM/therapeutic exercise, strengthening exercise, transfer/mobility retraining            OT IRF GOALS     Row Name 08/01/19 1159             Transfer Goal 1 (OT-IRF)    Activity/Assistive Device (Transfer Goal 1, OT-IRF)  toilet;shower chair;walk-in shower  -AF      [place] Level (Transfer Goal 1, OT-IRF)  verbal cues required;minimum assist (75% or more patient effort);contact guard assist  -AF      Time Frame (Transfer Goal 1, OT-IRF)   short term goal (STG)  -AF      Progress/Outcomes (Transfer Goal 1, OT-IRF)  goal ongoing  -AF         Transfer Goal 2 (OT-IRF)    Activity/Assistive Device (Transfer Goal 2, OT-IRF)  shower chair;walk-in shower;toilet  -AF      [place] Level (Transfer Goal 2, OT-IRF)  contact guard assist;verbal cues required  -AF      Time Frame (Transfer Goal 2, OT-IRF)  long term goal (LTG)  -AF      Progress/Outcomes (Transfer Goal 2, OT-IRF)  goal ongoing  -AF         Bathing Goal 1 (OT-IRF)    Activity/Device (Bathing Goal 1, OT-IRF)  bathing skills, all;grab bar/tub rail;hand-held shower spray hose;shower chair  -AF      [place] Level (Bathing Goal 1, OT-IRF)  minimum assist (75% or more patient effort);verbal cues required  -AF      Time Frame (Bathing Goal 1, OT-IRF)  short term goal (STG)  -AF      Progress/Outcomes (Bathing Goal 1, OT-IRF)  goal ongoing  -AF         Bathing Goal 2 (OT-IRF)    Activity/Device (Bathing Goal 2, OT-IRF)  bathing skills, all;grab bar/tub rail;hand-held shower spray hose;shower chair  -AF      [place] Level (Bathing Goal 2, OT-IRF)  contact guard assist;verbal cues required  -AF      Time Frame (Bathing Goal 2, OT-IRF)  long term goal (LTG)  -AF      Progress/Outcomes (Bathing Goal 2, OT-IRF)  goal ongoing  -AF         UB Dressing Goal 1 (OT-IRF)    Activity/Device (UB Dressing Goal 1, OT-IRF)  upper body dressing  -AF      [place] (UB Dress Goal 1, OT-IRF)  set-up required  -AF      Time Frame (UB Dressing Goal 1, OT-IRF)  long term goal (LTG)  -AF      Progress/Outcomes (UB Dressing Goal 1, OT-IRF)  goal ongoing  -AF         LB Dressing Goal 1 (OT-IRF)    Activity/Device (LB Dressing Goal 1, OT-IRF)  lower body dressing  -AF      [place] (LB Dressing Goal 1, OT-IRF)  moderate assist (50-74% patient effort);verbal cues required  -AF      Time Frame (LB Dressing Goal 1, OT-IRF)  short term goal (STG)  -AF      Progress/Outcomes (LB Dressing Goal 1, OT-IRF)  goal  ongoing  -AF         LB Dressing Goal 2 (OT-IRF)    Activity/Device (LB Dressing Goal 2, OT-IRF)  lower body dressing  -AF      [place] (LB Dressing Goal 2, OT-IRF)  contact guard assist;verbal cues required  -AF      Time Frame (LB Dressing Goal 2, OT-IRF)  long term goal (LTG)  -AF      Progress/Outcomes (LB Dressing Goal 2, OT-IRF)  goal ongoing  -AF         Grooming Goal 1 (OT-IRF)    Activity/Device (Grooming Goal 1, OT-IRF)  grooming skills, all  -AF      [place] (Grooming Goal 1, OT-IRF)  minimum assist (75% or more patient effort);verbal cues required  -AF      Time Frame (Grooming Goal 1, OT-IRF)  short term goal (STG)  -AF      Progress/Outcomes (Grooming Goal 1, OT-IRF)  goal ongoing  -AF         Grooming Goal 2 (OT-IRF)    Activity/Device (Grooming Goal 2, OT-IRF)  grooming skills, all  -AF      [place] (Grooming Goal 2, OT-IRF)  supervision required;verbal cues required  -AF      Time Frame (Grooming Goal 2, OT-IRF)  long term goal (LTG)  -AF      Progress/Outcomes (Grooming Goal 2, OT-IRF)  goal ongoing  -AF         Toileting Goal 1 (OT-IRF)    Activity/Device (Toileting Goal 1, OT-IRF)  toileting skills, all;grab bar/safety frame;raised toilet seat  -AF      [place] Level (Toileting Goal 1, OT-IRF)  moderate assist (50-74% patient effort);verbal cues required  -AF      Time Frame (Toileting Goal 1, OT-IRF)  short term goal (STG)  -AF      Progress/Outcomes (Toileting Goal 1, OT-IRF)  goal ongoing  -AF         Toileting Goal 2 (OT-IRF)    Activity/Device (Toileting Goal 2, OT-IRF)  toileting skills, all;grab bar/safety frame;raised toilet seat  -AF      [place] Level (Toileting Goal 2, OT-IRF)  verbal cues required;contact guard assist  -AF      Time Frame (Toileting Goal 2, OT-IRF)  long term goal (LTG)  -AF      Progress/Outcomes (Toileting Goal 2, OT-IRF)  goal ongoing  -AF         Caregiver Training Goal 1 (OT-IRF)    Caregiver Training Goal 1 (OT-IRF)  Family and patient  to demo safe technique with ADLs, transfers, HEP and adaptive equipment as needed   -AF      Time Frame (Caregiver Training Goal 1, OT-IRF)  long term goal (LTG)  -AF      Progress/Outcomes (Caregiver Training Goal 1, OT-IRF)  goal ongoing  -AF        User Key  (r) = Recorded By, (t) = Taken By, (c) = Cosigned By    Initials Name Provider Type    AF Ingris Ansari OTKEVIN Occupational Therapist          Occupational Therapy Education     Title: PT OT SLP Therapies (Not Started)     Topic: Occupational Therapy (Not Started)     Point: ADL training (In Progress)     Description: Instruct learner(s) on proper safety adaptation and remediation techniques during self care or transfers.   Instruct in proper use of assistive devices.    Learning Progress Summary           Patient Nonacceptance, E, NR by AF at 8/13/2019  3:48 PM    Comment:  benefits and purpose of therapy                               User Key     Initials Effective Dates Name Provider Type Discipline    AF 04/03/18 -  Ingris Ansari OTR Occupational Therapist OT                       Time Calculation:     Time Calculation- OT     Row Name 08/14/19 1526 08/14/19 1525          Time Calculation- OT    OT Start Time  1430  -AF  0800  -AF     OT Stop Time  1500  -AF  0830  -AF     OT Time Calculation (min)  30 min  -AF  30 min  -AF       User Key  (r) = Recorded By, (t) = Taken By, (c) = Cosigned By    Initials Name Provider Type    AF Ingris Ansari OTKEVIN Occupational Therapist          Therapy Charges for Today     Code Description Service Date Service Provider Modifiers Qty    25955207203 HC OT SELF CARE/MGMT/TRAIN EA 15 MIN 8/13/2019 Ingris Ansari OTKEVIN GO 1    29916879788 HC OT THER PROC EA 15 MIN 8/13/2019 Ingris Ansari OTKEVIN GO 1    08598889506 HC OT SELF CARE/MGMT/TRAIN EA 15 MIN 8/14/2019 Ingris Ansari OTKEVIN GO 2    39663310705 HC OT NEUROMUSC RE EDUCATION EA 15 MIN 8/14/2019 Ingris Ansari OTR GO 2                   Ingris Cottrell  Elan, OTR  8/14/2019

## 2020-01-11 NOTE — PROGRESS NOTES
Occupational Therapy: Branch    Physical Therapy: Branch    Speech Language Pathology:  Individual: 60 minutes.    Signed by: Katherine Bruton, SLP     complains of pain/discomfort

## 2020-03-27 ENCOUNTER — HOSPITAL ENCOUNTER (INPATIENT)
Facility: HOSPITAL | Age: 75
LOS: 24 days | Discharge: NURSING, REHAB OR LONG-TERM CARE | End: 2020-04-20
Attending: EMERGENCY MEDICINE | Admitting: INTERNAL MEDICINE

## 2020-03-27 ENCOUNTER — APPOINTMENT (OUTPATIENT)
Dept: GENERAL RADIOLOGY | Facility: HOSPITAL | Age: 75
End: 2020-03-27

## 2020-03-27 ENCOUNTER — APPOINTMENT (OUTPATIENT)
Dept: ULTRASOUND IMAGING | Facility: HOSPITAL | Age: 75
End: 2020-03-27

## 2020-03-27 ENCOUNTER — APPOINTMENT (OUTPATIENT)
Dept: CT IMAGING | Facility: HOSPITAL | Age: 75
End: 2020-03-27

## 2020-03-27 DIAGNOSIS — E87.5 HYPERKALEMIA: ICD-10-CM

## 2020-03-27 DIAGNOSIS — N17.9 ACUTE KIDNEY INJURY (HCC): ICD-10-CM

## 2020-03-27 DIAGNOSIS — R79.89 ELEVATED LFTS: ICD-10-CM

## 2020-03-27 DIAGNOSIS — K92.2 UPPER GI BLEED: Primary | ICD-10-CM

## 2020-03-27 DIAGNOSIS — N17.9 ACUTE RENAL FAILURE, UNSPECIFIED ACUTE RENAL FAILURE TYPE (HCC): ICD-10-CM

## 2020-03-27 LAB
ABO GROUP BLD: NORMAL
ALBUMIN SERPL-MCNC: 2.6 G/DL (ref 3.5–5.2)
ALBUMIN/GLOB SERPL: 0.9 G/DL
ALP SERPL-CCNC: 607 U/L (ref 39–117)
ALT SERPL W P-5'-P-CCNC: 152 U/L (ref 1–33)
ANION GAP SERPL CALCULATED.3IONS-SCNC: 15.7 MMOL/L (ref 5–15)
ANION GAP SERPL CALCULATED.3IONS-SCNC: 17.5 MMOL/L (ref 5–15)
AST SERPL-CCNC: 125 U/L (ref 1–32)
BACTERIA UR QL AUTO: ABNORMAL /HPF
BASOPHILS # BLD AUTO: 0.02 10*3/MM3 (ref 0–0.2)
BASOPHILS NFR BLD AUTO: 0.2 % (ref 0–1.5)
BILIRUB SERPL-MCNC: 4.1 MG/DL (ref 0.2–1.2)
BILIRUB UR QL STRIP: ABNORMAL
BLD GP AB SCN SERPL QL: NEGATIVE
BUN BLD-MCNC: 91 MG/DL (ref 8–23)
BUN BLD-MCNC: 97 MG/DL (ref 8–23)
BUN/CREAT SERPL: 16.4 (ref 7–25)
BUN/CREAT SERPL: 18.1 (ref 7–25)
CALCIUM SPEC-SCNC: 7.5 MG/DL (ref 8.6–10.5)
CALCIUM SPEC-SCNC: 7.8 MG/DL (ref 8.6–10.5)
CANCER AG19-9 SERPL-ACNC: <0.6 U/ML
CHLORIDE SERPL-SCNC: 97 MMOL/L (ref 98–107)
CHLORIDE SERPL-SCNC: 98 MMOL/L (ref 98–107)
CLARITY UR: ABNORMAL
CO2 SERPL-SCNC: 18.5 MMOL/L (ref 22–29)
CO2 SERPL-SCNC: 19.3 MMOL/L (ref 22–29)
COLOR UR: ABNORMAL
CREAT BLD-MCNC: 5.04 MG/DL (ref 0.57–1)
CREAT BLD-MCNC: 5.91 MG/DL (ref 0.57–1)
D-LACTATE SERPL-SCNC: 1.5 MMOL/L (ref 0.5–2)
DEPRECATED RDW RBC AUTO: 48.2 FL (ref 37–54)
EOSINOPHIL # BLD AUTO: 0.01 10*3/MM3 (ref 0–0.4)
EOSINOPHIL NFR BLD AUTO: 0.1 % (ref 0.3–6.2)
ERYTHROCYTE [DISTWIDTH] IN BLOOD BY AUTOMATED COUNT: 14.6 % (ref 12.3–15.4)
EXPIRATION DATE: ABNORMAL
FECAL OCCULT BLOOD SCREEN, POC: POSITIVE
GFR SERPL CREATININE-BSD FRML MDRD: 7 ML/MIN/1.73
GFR SERPL CREATININE-BSD FRML MDRD: 8 ML/MIN/1.73
GFR SERPL CREATININE-BSD FRML MDRD: ABNORMAL ML/MIN/{1.73_M2}
GFR SERPL CREATININE-BSD FRML MDRD: ABNORMAL ML/MIN/{1.73_M2}
GLOBULIN UR ELPH-MCNC: 2.9 GM/DL
GLUCOSE BLD-MCNC: 173 MG/DL (ref 65–99)
GLUCOSE BLD-MCNC: 186 MG/DL (ref 65–99)
GLUCOSE BLDC GLUCOMTR-MCNC: 146 MG/DL (ref 70–130)
GLUCOSE BLDC GLUCOMTR-MCNC: 186 MG/DL (ref 70–130)
GLUCOSE BLDC GLUCOMTR-MCNC: 213 MG/DL (ref 70–130)
GLUCOSE UR STRIP-MCNC: NEGATIVE MG/DL
HCT VFR BLD AUTO: 27.1 % (ref 34–46.6)
HCT VFR BLD AUTO: 32.2 % (ref 34–46.6)
HGB BLD-MCNC: 10.4 G/DL (ref 12–15.9)
HGB BLD-MCNC: 8.9 G/DL (ref 12–15.9)
HGB UR QL STRIP.AUTO: NEGATIVE
HOLD SPECIMEN: NORMAL
HOLD SPECIMEN: NORMAL
HYALINE CASTS UR QL AUTO: ABNORMAL /LPF
IMM GRANULOCYTES # BLD AUTO: 0.1 10*3/MM3 (ref 0–0.05)
IMM GRANULOCYTES NFR BLD AUTO: 0.8 % (ref 0–0.5)
KETONES UR QL STRIP: NEGATIVE
LEUKOCYTE ESTERASE UR QL STRIP.AUTO: ABNORMAL
LIPASE SERPL-CCNC: 207 U/L (ref 13–60)
LYMPHOCYTES # BLD AUTO: 0.43 10*3/MM3 (ref 0.7–3.1)
LYMPHOCYTES NFR BLD AUTO: 3.4 % (ref 19.6–45.3)
Lab: 140
MCH RBC QN AUTO: 29.1 PG (ref 26.6–33)
MCHC RBC AUTO-ENTMCNC: 32.3 G/DL (ref 31.5–35.7)
MCV RBC AUTO: 90.2 FL (ref 79–97)
MONOCYTES # BLD AUTO: 0.24 10*3/MM3 (ref 0.1–0.9)
MONOCYTES NFR BLD AUTO: 1.9 % (ref 5–12)
NEGATIVE CONTROL: NEGATIVE
NEUTROPHILS # BLD AUTO: 11.7 10*3/MM3 (ref 1.7–7)
NEUTROPHILS NFR BLD AUTO: 93.6 % (ref 42.7–76)
NITRITE UR QL STRIP: POSITIVE
NRBC BLD AUTO-RTO: 0 /100 WBC (ref 0–0.2)
PH UR STRIP.AUTO: <=5 [PH] (ref 5–8)
PLATELET # BLD AUTO: 159 10*3/MM3 (ref 140–450)
PMV BLD AUTO: 10.1 FL (ref 6–12)
POSITIVE CONTROL: POSITIVE
POTASSIUM BLD-SCNC: 5.3 MMOL/L (ref 3.5–5.2)
POTASSIUM BLD-SCNC: 5.9 MMOL/L (ref 3.5–5.2)
PROCALCITONIN SERPL-MCNC: 2.07 NG/ML (ref 0.1–0.25)
PROT SERPL-MCNC: 5.5 G/DL (ref 6–8.5)
PROT UR QL STRIP: ABNORMAL
RBC # BLD AUTO: 3.57 10*6/MM3 (ref 3.77–5.28)
RBC # UR: ABNORMAL /HPF
REF LAB TEST METHOD: ABNORMAL
RH BLD: POSITIVE
SODIUM BLD-SCNC: 133 MMOL/L (ref 136–145)
SODIUM BLD-SCNC: 133 MMOL/L (ref 136–145)
SP GR UR STRIP: 1.02 (ref 1–1.03)
SQUAMOUS #/AREA URNS HPF: ABNORMAL /HPF
T&S EXPIRATION DATE: NORMAL
UROBILINOGEN UR QL STRIP: ABNORMAL
WBC NRBC COR # BLD: 12.5 10*3/MM3 (ref 3.4–10.8)
WBC UR QL AUTO: ABNORMAL /HPF
WHOLE BLOOD HOLD SPECIMEN: NORMAL
WHOLE BLOOD HOLD SPECIMEN: NORMAL

## 2020-03-27 PROCEDURE — 25010000002 FENTANYL CITRATE (PF) 100 MCG/2ML SOLUTION

## 2020-03-27 PROCEDURE — 63710000001 INSULIN REGULAR HUMAN PER 5 UNITS: Performed by: EMERGENCY MEDICINE

## 2020-03-27 PROCEDURE — 86900 BLOOD TYPING SEROLOGIC ABO: CPT

## 2020-03-27 PROCEDURE — 85018 HEMOGLOBIN: CPT | Performed by: EMERGENCY MEDICINE

## 2020-03-27 PROCEDURE — 82962 GLUCOSE BLOOD TEST: CPT

## 2020-03-27 PROCEDURE — P9016 RBC LEUKOCYTES REDUCED: HCPCS

## 2020-03-27 PROCEDURE — 85018 HEMOGLOBIN: CPT | Performed by: INTERNAL MEDICINE

## 2020-03-27 PROCEDURE — 25010000002 CALCIUM GLUCONATE PER 10 ML: Performed by: INTERNAL MEDICINE

## 2020-03-27 PROCEDURE — 25010000002 PROTHROMBIN COMPLEX CONC HUMAN 1000 UNITS KIT: Performed by: INTERNAL MEDICINE

## 2020-03-27 PROCEDURE — 86850 RBC ANTIBODY SCREEN: CPT | Performed by: EMERGENCY MEDICINE

## 2020-03-27 PROCEDURE — 85014 HEMATOCRIT: CPT | Performed by: EMERGENCY MEDICINE

## 2020-03-27 PROCEDURE — 81001 URINALYSIS AUTO W/SCOPE: CPT | Performed by: HOSPITALIST

## 2020-03-27 PROCEDURE — 93005 ELECTROCARDIOGRAM TRACING: CPT | Performed by: EMERGENCY MEDICINE

## 2020-03-27 PROCEDURE — 85014 HEMATOCRIT: CPT | Performed by: INTERNAL MEDICINE

## 2020-03-27 PROCEDURE — 86900 BLOOD TYPING SEROLOGIC ABO: CPT | Performed by: EMERGENCY MEDICINE

## 2020-03-27 PROCEDURE — 86923 COMPATIBILITY TEST ELECTRIC: CPT

## 2020-03-27 PROCEDURE — 99285 EMERGENCY DEPT VISIT HI MDM: CPT

## 2020-03-27 PROCEDURE — 93005 ELECTROCARDIOGRAM TRACING: CPT | Performed by: INTERNAL MEDICINE

## 2020-03-27 PROCEDURE — 87086 URINE CULTURE/COLONY COUNT: CPT | Performed by: HOSPITALIST

## 2020-03-27 PROCEDURE — 25010000002 CALCIUM GLUCONATE PER 10 ML: Performed by: EMERGENCY MEDICINE

## 2020-03-27 PROCEDURE — 82270 OCCULT BLOOD FECES: CPT | Performed by: EMERGENCY MEDICINE

## 2020-03-27 PROCEDURE — 86301 IMMUNOASSAY TUMOR CA 19-9: CPT | Performed by: INTERNAL MEDICINE

## 2020-03-27 PROCEDURE — 83605 ASSAY OF LACTIC ACID: CPT | Performed by: INTERNAL MEDICINE

## 2020-03-27 PROCEDURE — 93010 ELECTROCARDIOGRAM REPORT: CPT | Performed by: INTERNAL MEDICINE

## 2020-03-27 PROCEDURE — 36430 TRANSFUSION BLD/BLD COMPNT: CPT

## 2020-03-27 PROCEDURE — 80053 COMPREHEN METABOLIC PANEL: CPT | Performed by: EMERGENCY MEDICINE

## 2020-03-27 PROCEDURE — 85025 COMPLETE CBC W/AUTO DIFF WBC: CPT | Performed by: EMERGENCY MEDICINE

## 2020-03-27 PROCEDURE — 80048 BASIC METABOLIC PNL TOTAL CA: CPT | Performed by: INTERNAL MEDICINE

## 2020-03-27 PROCEDURE — 05HY33Z INSERTION OF INFUSION DEVICE INTO UPPER VEIN, PERCUTANEOUS APPROACH: ICD-10-PCS | Performed by: INTERNAL MEDICINE

## 2020-03-27 PROCEDURE — C9132 KCENTRA, PER I.U.: HCPCS | Performed by: INTERNAL MEDICINE

## 2020-03-27 PROCEDURE — 30283B1 TRANSFUSION OF NONAUTOLOGOUS 4-FACTOR PROTHROMBIN COMPLEX CONCENTRATE INTO VEIN, PERCUTANEOUS APPROACH: ICD-10-PCS | Performed by: INTERNAL MEDICINE

## 2020-03-27 PROCEDURE — 74176 CT ABD & PELVIS W/O CONTRAST: CPT

## 2020-03-27 PROCEDURE — 84145 PROCALCITONIN (PCT): CPT | Performed by: INTERNAL MEDICINE

## 2020-03-27 PROCEDURE — 99222 1ST HOSP IP/OBS MODERATE 55: CPT | Performed by: INTERNAL MEDICINE

## 2020-03-27 PROCEDURE — C1751 CATH, INF, PER/CENT/MIDLINE: HCPCS

## 2020-03-27 PROCEDURE — 76700 US EXAM ABDOM COMPLETE: CPT

## 2020-03-27 PROCEDURE — 83690 ASSAY OF LIPASE: CPT | Performed by: EMERGENCY MEDICINE

## 2020-03-27 PROCEDURE — 63710000001 INSULIN LISPRO (HUMAN) PER 5 UNITS: Performed by: INTERNAL MEDICINE

## 2020-03-27 PROCEDURE — 86901 BLOOD TYPING SEROLOGIC RH(D): CPT | Performed by: EMERGENCY MEDICINE

## 2020-03-27 RX ORDER — FENTANYL CITRATE 50 UG/ML
INJECTION, SOLUTION INTRAMUSCULAR; INTRAVENOUS
Status: COMPLETED
Start: 2020-03-27 | End: 2020-03-27

## 2020-03-27 RX ORDER — DEXTROSE MONOHYDRATE 25 G/50ML
50 INJECTION, SOLUTION INTRAVENOUS ONCE
Status: COMPLETED | OUTPATIENT
Start: 2020-03-27 | End: 2020-03-27

## 2020-03-27 RX ORDER — DEXTROSE MONOHYDRATE 25 G/50ML
25 INJECTION, SOLUTION INTRAVENOUS
Status: DISCONTINUED | OUTPATIENT
Start: 2020-03-27 | End: 2020-03-28 | Stop reason: HOSPADM

## 2020-03-27 RX ORDER — ONDANSETRON 4 MG/1
4 TABLET, FILM COATED ORAL EVERY 6 HOURS PRN
Status: DISCONTINUED | OUTPATIENT
Start: 2020-03-27 | End: 2020-03-28 | Stop reason: HOSPADM

## 2020-03-27 RX ORDER — NAPROXEN 500 MG/1
500 TABLET ORAL 2 TIMES DAILY WITH MEALS
COMMUNITY
End: 2020-04-20 | Stop reason: HOSPADM

## 2020-03-27 RX ORDER — FENTANYL CITRATE 50 UG/ML
25 INJECTION, SOLUTION INTRAMUSCULAR; INTRAVENOUS
Status: DISCONTINUED | OUTPATIENT
Start: 2020-03-27 | End: 2020-03-28 | Stop reason: HOSPADM

## 2020-03-27 RX ORDER — PANTOPRAZOLE SODIUM 40 MG/10ML
80 INJECTION, POWDER, LYOPHILIZED, FOR SOLUTION INTRAVENOUS ONCE
Status: COMPLETED | OUTPATIENT
Start: 2020-03-27 | End: 2020-03-27

## 2020-03-27 RX ORDER — IPRATROPIUM BROMIDE AND ALBUTEROL SULFATE 2.5; .5 MG/3ML; MG/3ML
3 SOLUTION RESPIRATORY (INHALATION)
Status: DISCONTINUED | OUTPATIENT
Start: 2020-03-27 | End: 2020-03-28 | Stop reason: HOSPADM

## 2020-03-27 RX ORDER — CEFTRIAXONE SODIUM 1 G/50ML
1 INJECTION, SOLUTION INTRAVENOUS EVERY 24 HOURS
Status: DISCONTINUED | OUTPATIENT
Start: 2020-03-27 | End: 2020-03-28 | Stop reason: HOSPADM

## 2020-03-27 RX ORDER — SODIUM CHLORIDE 0.9 % (FLUSH) 0.9 %
10 SYRINGE (ML) INJECTION AS NEEDED
Status: DISCONTINUED | OUTPATIENT
Start: 2020-03-27 | End: 2020-03-28 | Stop reason: HOSPADM

## 2020-03-27 RX ORDER — ONDANSETRON 2 MG/ML
4 INJECTION INTRAMUSCULAR; INTRAVENOUS EVERY 6 HOURS PRN
Status: DISCONTINUED | OUTPATIENT
Start: 2020-03-27 | End: 2020-03-28 | Stop reason: HOSPADM

## 2020-03-27 RX ORDER — NICOTINE POLACRILEX 4 MG
15 LOZENGE BUCCAL
Status: DISCONTINUED | OUTPATIENT
Start: 2020-03-27 | End: 2020-03-28 | Stop reason: HOSPADM

## 2020-03-27 RX ORDER — ACETAMINOPHEN 325 MG/1
650 TABLET ORAL EVERY 6 HOURS
COMMUNITY

## 2020-03-27 RX ORDER — ACETAMINOPHEN 650 MG/1
650 SUPPOSITORY RECTAL EVERY 4 HOURS PRN
Status: DISCONTINUED | OUTPATIENT
Start: 2020-03-27 | End: 2020-03-28 | Stop reason: HOSPADM

## 2020-03-27 RX ORDER — ERGOCALCIFEROL 1.25 MG/1
50000 CAPSULE ORAL WEEKLY
COMMUNITY
End: 2022-10-18

## 2020-03-27 RX ORDER — SODIUM CHLORIDE 0.9 % (FLUSH) 0.9 %
10 SYRINGE (ML) INJECTION EVERY 12 HOURS SCHEDULED
Status: DISCONTINUED | OUTPATIENT
Start: 2020-03-27 | End: 2020-03-28 | Stop reason: HOSPADM

## 2020-03-27 RX ORDER — FERROUS SULFATE 325(65) MG
325 TABLET ORAL
COMMUNITY
End: 2020-12-01

## 2020-03-27 RX ORDER — ACETAMINOPHEN 325 MG/1
650 TABLET ORAL EVERY 4 HOURS PRN
Status: DISCONTINUED | OUTPATIENT
Start: 2020-03-27 | End: 2020-03-28 | Stop reason: HOSPADM

## 2020-03-27 RX ADMIN — DEXTROSE MONOHYDRATE 50 ML: 25 INJECTION, SOLUTION INTRAVENOUS at 12:34

## 2020-03-27 RX ADMIN — SODIUM CHLORIDE, POTASSIUM CHLORIDE, SODIUM LACTATE AND CALCIUM CHLORIDE 1000 ML: 600; 310; 30; 20 INJECTION, SOLUTION INTRAVENOUS at 12:09

## 2020-03-27 RX ADMIN — FENTANYL CITRATE 25 MCG: 50 INJECTION, SOLUTION INTRAMUSCULAR; INTRAVENOUS at 17:15

## 2020-03-27 RX ADMIN — Medication 3287 UNITS: at 17:42

## 2020-03-27 RX ADMIN — INSULIN LISPRO 2 UNITS: 100 INJECTION, SOLUTION INTRAVENOUS; SUBCUTANEOUS at 20:36

## 2020-03-27 RX ADMIN — SODIUM BICARBONATE 150 MEQ/1,000 ML IN DEXTROSE 5 % INTRAVENOUS 150 MEQ: SOLUTION at 15:58

## 2020-03-27 RX ADMIN — INSULIN HUMAN 10 UNITS: 100 INJECTION, SOLUTION PARENTERAL at 12:33

## 2020-03-27 RX ADMIN — SODIUM CHLORIDE, POTASSIUM CHLORIDE, SODIUM LACTATE AND CALCIUM CHLORIDE 1000 ML: 600; 310; 30; 20 INJECTION, SOLUTION INTRAVENOUS at 13:06

## 2020-03-27 RX ADMIN — SODIUM CHLORIDE, POTASSIUM CHLORIDE, SODIUM LACTATE AND CALCIUM CHLORIDE 1000 ML: 600; 310; 30; 20 INJECTION, SOLUTION INTRAVENOUS at 15:22

## 2020-03-27 RX ADMIN — SODIUM CHLORIDE 8 MG/HR: 900 INJECTION INTRAVENOUS at 15:58

## 2020-03-27 RX ADMIN — INSULIN LISPRO 2 UNITS: 100 INJECTION, SOLUTION INTRAVENOUS; SUBCUTANEOUS at 18:38

## 2020-03-27 RX ADMIN — SODIUM CHLORIDE, POTASSIUM CHLORIDE, SODIUM LACTATE AND CALCIUM CHLORIDE 500 ML: 600; 310; 30; 20 INJECTION, SOLUTION INTRAVENOUS at 10:47

## 2020-03-27 RX ADMIN — CALCIUM GLUCONATE 1 G: 98 INJECTION, SOLUTION INTRAVENOUS at 12:41

## 2020-03-27 RX ADMIN — SODIUM CHLORIDE 8 MG/HR: 900 INJECTION INTRAVENOUS at 11:24

## 2020-03-27 RX ADMIN — PANTOPRAZOLE SODIUM 80 MG: 40 INJECTION, POWDER, FOR SOLUTION INTRAVENOUS at 11:23

## 2020-03-27 RX ADMIN — CALCIUM GLUCONATE 1 G: 98 INJECTION, SOLUTION INTRAVENOUS at 16:20

## 2020-03-27 RX ADMIN — SODIUM CHLORIDE, PRESERVATIVE FREE 10 ML: 5 INJECTION INTRAVENOUS at 20:36

## 2020-03-27 RX ADMIN — SODIUM CHLORIDE, POTASSIUM CHLORIDE, SODIUM LACTATE AND CALCIUM CHLORIDE 500 ML: 600; 310; 30; 20 INJECTION, SOLUTION INTRAVENOUS at 10:49

## 2020-03-28 ENCOUNTER — ANESTHESIA EVENT (OUTPATIENT)
Dept: GASTROENTEROLOGY | Facility: HOSPITAL | Age: 75
End: 2020-03-28

## 2020-03-28 ENCOUNTER — ANESTHESIA (OUTPATIENT)
Dept: GASTROENTEROLOGY | Facility: HOSPITAL | Age: 75
End: 2020-03-28

## 2020-03-28 LAB
ALBUMIN SERPL-MCNC: 2.1 G/DL (ref 3.5–5.2)
ALBUMIN/GLOB SERPL: 0.9 G/DL
ALP SERPL-CCNC: 431 U/L (ref 39–117)
ALT SERPL W P-5'-P-CCNC: 104 U/L (ref 1–33)
ANION GAP SERPL CALCULATED.3IONS-SCNC: 14.9 MMOL/L (ref 5–15)
ANION GAP SERPL CALCULATED.3IONS-SCNC: 15.7 MMOL/L (ref 5–15)
AST SERPL-CCNC: 99 U/L (ref 1–32)
BACTERIA SPEC AEROBE CULT: NO GROWTH
BH BB BLOOD EXPIRATION DATE: NORMAL
BH BB BLOOD TYPE BARCODE: 7300
BH BB DISPENSE STATUS: NORMAL
BH BB PRODUCT CODE: NORMAL
BH BB UNIT NUMBER: NORMAL
BILIRUB SERPL-MCNC: 3.7 MG/DL (ref 0.2–1.2)
BUN BLD-MCNC: 101 MG/DL (ref 8–23)
BUN BLD-MCNC: 103 MG/DL (ref 8–23)
BUN/CREAT SERPL: 19.6 (ref 7–25)
BUN/CREAT SERPL: 20.1 (ref 7–25)
CALCIUM SPEC-SCNC: 6.6 MG/DL (ref 8.6–10.5)
CALCIUM SPEC-SCNC: 7 MG/DL (ref 8.6–10.5)
CHLORIDE SERPL-SCNC: 93 MMOL/L (ref 98–107)
CHLORIDE SERPL-SCNC: 93 MMOL/L (ref 98–107)
CO2 SERPL-SCNC: 24.1 MMOL/L (ref 22–29)
CO2 SERPL-SCNC: 26.3 MMOL/L (ref 22–29)
CREAT BLD-MCNC: 5.03 MG/DL (ref 0.57–1)
CREAT BLD-MCNC: 5.26 MG/DL (ref 0.57–1)
CROSSMATCH INTERPRETATION: NORMAL
D-LACTATE SERPL-SCNC: 1.2 MMOL/L (ref 0.5–2)
D-LACTATE SERPL-SCNC: 1.3 MMOL/L (ref 0.5–2)
DEPRECATED RDW RBC AUTO: 48.1 FL (ref 37–54)
EOSINOPHIL # BLD MANUAL: 0.05 10*3/MM3 (ref 0–0.4)
EOSINOPHIL NFR BLD MANUAL: 0.4 % (ref 0.3–6.2)
ERYTHROCYTE [DISTWIDTH] IN BLOOD BY AUTOMATED COUNT: 15.6 % (ref 12.3–15.4)
GFR SERPL CREATININE-BSD FRML MDRD: 8 ML/MIN/1.73
GFR SERPL CREATININE-BSD FRML MDRD: 8 ML/MIN/1.73
GFR SERPL CREATININE-BSD FRML MDRD: ABNORMAL ML/MIN/{1.73_M2}
GFR SERPL CREATININE-BSD FRML MDRD: ABNORMAL ML/MIN/{1.73_M2}
GLOBULIN UR ELPH-MCNC: 2.3 GM/DL
GLUCOSE BLD-MCNC: 193 MG/DL (ref 65–99)
GLUCOSE BLD-MCNC: 206 MG/DL (ref 65–99)
GLUCOSE BLDC GLUCOMTR-MCNC: 117 MG/DL (ref 70–130)
GLUCOSE BLDC GLUCOMTR-MCNC: 144 MG/DL (ref 70–130)
GLUCOSE BLDC GLUCOMTR-MCNC: 148 MG/DL (ref 70–130)
GLUCOSE BLDC GLUCOMTR-MCNC: 149 MG/DL (ref 70–130)
GLUCOSE BLDC GLUCOMTR-MCNC: 165 MG/DL (ref 70–130)
GLUCOSE BLDC GLUCOMTR-MCNC: 199 MG/DL (ref 70–130)
HCT VFR BLD AUTO: 29.9 % (ref 34–46.6)
HCT VFR BLD AUTO: 31.9 % (ref 34–46.6)
HCT VFR BLD AUTO: 32.8 % (ref 34–46.6)
HCT VFR BLD AUTO: 33.7 % (ref 34–46.6)
HGB BLD-MCNC: 10 G/DL (ref 12–15.9)
HGB BLD-MCNC: 10.5 G/DL (ref 12–15.9)
HGB BLD-MCNC: 11.2 G/DL (ref 12–15.9)
HGB BLD-MCNC: 11.3 G/DL (ref 12–15.9)
LYMPHOCYTES # BLD MANUAL: 0.42 10*3/MM3 (ref 0.7–3.1)
LYMPHOCYTES NFR BLD MANUAL: 0.4 % (ref 5–12)
LYMPHOCYTES NFR BLD MANUAL: 3.6 % (ref 19.6–45.3)
MCH RBC QN AUTO: 28.1 PG (ref 26.6–33)
MCHC RBC AUTO-ENTMCNC: 32.9 G/DL (ref 31.5–35.7)
MCV RBC AUTO: 85.3 FL (ref 79–97)
MONOCYTES # BLD AUTO: 0.05 10*3/MM3 (ref 0.1–0.9)
NEUTROPHILS # BLD AUTO: 11.24 10*3/MM3 (ref 1.7–7)
NEUTROPHILS NFR BLD MANUAL: 95.6 % (ref 42.7–76)
PLAT MORPH BLD: NORMAL
PLATELET # BLD AUTO: 98 10*3/MM3 (ref 140–450)
PMV BLD AUTO: 10.7 FL (ref 6–12)
POIKILOCYTOSIS BLD QL SMEAR: ABNORMAL
POTASSIUM BLD-SCNC: 4.6 MMOL/L (ref 3.5–5.2)
POTASSIUM BLD-SCNC: 4.8 MMOL/L (ref 3.5–5.2)
PROT SERPL-MCNC: 4.4 G/DL (ref 6–8.5)
RBC # BLD AUTO: 3.74 10*6/MM3 (ref 3.77–5.28)
SODIUM BLD-SCNC: 132 MMOL/L (ref 136–145)
SODIUM BLD-SCNC: 135 MMOL/L (ref 136–145)
UNIT  ABO: NORMAL
UNIT  RH: NORMAL
WBC MORPH BLD: NORMAL
WBC NRBC COR # BLD: 11.76 10*3/MM3 (ref 3.4–10.8)

## 2020-03-28 PROCEDURE — 86900 BLOOD TYPING SEROLOGIC ABO: CPT

## 2020-03-28 PROCEDURE — 80048 BASIC METABOLIC PNL TOTAL CA: CPT | Performed by: INTERNAL MEDICINE

## 2020-03-28 PROCEDURE — 85018 HEMOGLOBIN: CPT | Performed by: INTERNAL MEDICINE

## 2020-03-28 PROCEDURE — 85025 COMPLETE CBC W/AUTO DIFF WBC: CPT | Performed by: INTERNAL MEDICINE

## 2020-03-28 PROCEDURE — 85007 BL SMEAR W/DIFF WBC COUNT: CPT | Performed by: INTERNAL MEDICINE

## 2020-03-28 PROCEDURE — 25010000002 CEFTRIAXONE PER 250 MG: Performed by: INTERNAL MEDICINE

## 2020-03-28 PROCEDURE — P9016 RBC LEUKOCYTES REDUCED: HCPCS

## 2020-03-28 PROCEDURE — 25010000002 EPINEPHRINE PER 0.1 MG: Performed by: INTERNAL MEDICINE

## 2020-03-28 PROCEDURE — 94640 AIRWAY INHALATION TREATMENT: CPT

## 2020-03-28 PROCEDURE — 94799 UNLISTED PULMONARY SVC/PX: CPT

## 2020-03-28 PROCEDURE — 63710000001 INSULIN LISPRO (HUMAN) PER 5 UNITS: Performed by: INTERNAL MEDICINE

## 2020-03-28 PROCEDURE — 36430 TRANSFUSION BLD/BLD COMPNT: CPT

## 2020-03-28 PROCEDURE — 83605 ASSAY OF LACTIC ACID: CPT | Performed by: INTERNAL MEDICINE

## 2020-03-28 PROCEDURE — 43270 EGD LESION ABLATION: CPT | Performed by: INTERNAL MEDICINE

## 2020-03-28 PROCEDURE — 85014 HEMATOCRIT: CPT | Performed by: INTERNAL MEDICINE

## 2020-03-28 PROCEDURE — 25010000002 PROPOFOL 10 MG/ML EMULSION: Performed by: ANESTHESIOLOGY

## 2020-03-28 PROCEDURE — 80053 COMPREHEN METABOLIC PANEL: CPT | Performed by: INTERNAL MEDICINE

## 2020-03-28 PROCEDURE — 82962 GLUCOSE BLOOD TEST: CPT

## 2020-03-28 PROCEDURE — 0DJ08ZZ INSPECTION OF UPPER INTESTINAL TRACT, VIA NATURAL OR ARTIFICIAL OPENING ENDOSCOPIC: ICD-10-PCS | Performed by: INTERNAL MEDICINE

## 2020-03-28 RX ORDER — DEXTROSE MONOHYDRATE 25 G/50ML
25 INJECTION, SOLUTION INTRAVENOUS
Status: DISCONTINUED | OUTPATIENT
Start: 2020-03-28 | End: 2020-04-20 | Stop reason: HOSPADM

## 2020-03-28 RX ORDER — CEFTRIAXONE SODIUM 1 G/50ML
1 INJECTION, SOLUTION INTRAVENOUS EVERY 24 HOURS
Status: DISCONTINUED | OUTPATIENT
Start: 2020-03-28 | End: 2020-04-01

## 2020-03-28 RX ORDER — NOREPINEPHRINE BIT/0.9 % NACL 8 MG/250ML
.02-.3 INFUSION BOTTLE (ML) INTRAVENOUS
Status: DISCONTINUED | OUTPATIENT
Start: 2020-03-28 | End: 2020-03-28 | Stop reason: HOSPADM

## 2020-03-28 RX ORDER — LIDOCAINE HYDROCHLORIDE 20 MG/ML
INJECTION, SOLUTION INFILTRATION; PERINEURAL AS NEEDED
Status: DISCONTINUED | OUTPATIENT
Start: 2020-03-28 | End: 2020-03-28 | Stop reason: SURG

## 2020-03-28 RX ORDER — PROPOFOL 10 MG/ML
VIAL (ML) INTRAVENOUS AS NEEDED
Status: DISCONTINUED | OUTPATIENT
Start: 2020-03-28 | End: 2020-03-28 | Stop reason: SURG

## 2020-03-28 RX ORDER — ACETAMINOPHEN 325 MG/1
650 TABLET ORAL EVERY 4 HOURS PRN
Status: DISCONTINUED | OUTPATIENT
Start: 2020-03-28 | End: 2020-04-20 | Stop reason: HOSPADM

## 2020-03-28 RX ORDER — ONDANSETRON 4 MG/1
4 TABLET, FILM COATED ORAL EVERY 6 HOURS PRN
Status: DISCONTINUED | OUTPATIENT
Start: 2020-03-28 | End: 2020-04-20 | Stop reason: HOSPADM

## 2020-03-28 RX ORDER — IPRATROPIUM BROMIDE AND ALBUTEROL SULFATE 2.5; .5 MG/3ML; MG/3ML
3 SOLUTION RESPIRATORY (INHALATION)
Status: DISCONTINUED | OUTPATIENT
Start: 2020-03-28 | End: 2020-04-20 | Stop reason: HOSPADM

## 2020-03-28 RX ORDER — NICOTINE POLACRILEX 4 MG
15 LOZENGE BUCCAL
Status: DISCONTINUED | OUTPATIENT
Start: 2020-03-28 | End: 2020-04-20 | Stop reason: HOSPADM

## 2020-03-28 RX ORDER — ACETAMINOPHEN 650 MG/1
650 SUPPOSITORY RECTAL EVERY 4 HOURS PRN
Status: DISCONTINUED | OUTPATIENT
Start: 2020-03-28 | End: 2020-04-20 | Stop reason: HOSPADM

## 2020-03-28 RX ORDER — BUMETANIDE 0.25 MG/ML
2 INJECTION INTRAMUSCULAR; INTRAVENOUS ONCE
Status: COMPLETED | OUTPATIENT
Start: 2020-03-28 | End: 2020-03-28

## 2020-03-28 RX ORDER — NOREPINEPHRINE BIT/0.9 % NACL 8 MG/250ML
.02-.3 INFUSION BOTTLE (ML) INTRAVENOUS
Status: DISCONTINUED | OUTPATIENT
Start: 2020-03-28 | End: 2020-03-29

## 2020-03-28 RX ORDER — ONDANSETRON 2 MG/ML
4 INJECTION INTRAMUSCULAR; INTRAVENOUS EVERY 6 HOURS PRN
Status: DISCONTINUED | OUTPATIENT
Start: 2020-03-28 | End: 2020-04-20 | Stop reason: HOSPADM

## 2020-03-28 RX ORDER — SODIUM CHLORIDE 9 MG/ML
INJECTION, SOLUTION INTRAVENOUS CONTINUOUS PRN
Status: DISCONTINUED | OUTPATIENT
Start: 2020-03-28 | End: 2020-03-28 | Stop reason: SURG

## 2020-03-28 RX ADMIN — SODIUM CHLORIDE: 9 INJECTION, SOLUTION INTRAVENOUS at 09:41

## 2020-03-28 RX ADMIN — INSULIN LISPRO 2 UNITS: 100 INJECTION, SOLUTION INTRAVENOUS; SUBCUTANEOUS at 04:30

## 2020-03-28 RX ADMIN — PROPOFOL 10 MG: 10 INJECTION, EMULSION INTRAVENOUS at 09:53

## 2020-03-28 RX ADMIN — ALBUTEROL SULFATE 10 MG: 2.5 SOLUTION RESPIRATORY (INHALATION) at 12:52

## 2020-03-28 RX ADMIN — SODIUM CHLORIDE 8 MG/HR: 900 INJECTION INTRAVENOUS at 05:46

## 2020-03-28 RX ADMIN — SODIUM CHLORIDE 8 MG/HR: 900 INJECTION INTRAVENOUS at 00:35

## 2020-03-28 RX ADMIN — SODIUM CHLORIDE 8 MG/HR: 900 INJECTION INTRAVENOUS at 16:49

## 2020-03-28 RX ADMIN — SODIUM BICARBONATE 150 MEQ/1,000 ML IN DEXTROSE 5 % INTRAVENOUS 150 MEQ: SOLUTION at 19:38

## 2020-03-28 RX ADMIN — SODIUM BICARBONATE 150 MEQ/1,000 ML IN DEXTROSE 5 % INTRAVENOUS 150 MEQ: SOLUTION at 00:36

## 2020-03-28 RX ADMIN — PROPOFOL 10 MG: 10 INJECTION, EMULSION INTRAVENOUS at 09:57

## 2020-03-28 RX ADMIN — CEFTRIAXONE SODIUM 1 G: 1 INJECTION, SOLUTION INTRAVENOUS at 22:14

## 2020-03-28 RX ADMIN — PROPOFOL 10 MG: 10 INJECTION, EMULSION INTRAVENOUS at 10:01

## 2020-03-28 RX ADMIN — BUMETANIDE 2 MG: 0.25 INJECTION INTRAMUSCULAR; INTRAVENOUS at 09:00

## 2020-03-28 RX ADMIN — INSULIN LISPRO 2 UNITS: 100 INJECTION, SOLUTION INTRAVENOUS; SUBCUTANEOUS at 14:04

## 2020-03-28 RX ADMIN — SODIUM BICARBONATE 150 MEQ/1,000 ML IN DEXTROSE 5 % INTRAVENOUS 150 MEQ: SOLUTION at 09:00

## 2020-03-28 RX ADMIN — PROPOFOL 50 MG: 10 INJECTION, EMULSION INTRAVENOUS at 09:50

## 2020-03-28 RX ADMIN — Medication 0.02 MCG/KG/MIN: at 02:33

## 2020-03-28 RX ADMIN — SODIUM CHLORIDE, PRESERVATIVE FREE 10 ML: 5 INJECTION INTRAVENOUS at 09:02

## 2020-03-28 RX ADMIN — SODIUM CHLORIDE 8 MG/HR: 900 INJECTION INTRAVENOUS at 23:10

## 2020-03-28 RX ADMIN — LIDOCAINE HYDROCHLORIDE 20 MG: 20 INJECTION, SOLUTION INFILTRATION; PERINEURAL at 09:50

## 2020-03-28 NOTE — ANESTHESIA POSTPROCEDURE EVALUATION
"Patient: Darby Workman    Procedure Summary     Date:  03/28/20 Room / Location:   [facility]    Anesthesia Start:  0941 Anesthesia Stop:  1007    Procedure:  ESOPHAGOGASTRODUODENOSCOPY AT BEDSIDE (N/A Esophagus) Diagnosis:       Upper GI bleed      (Upper GI bleed [K92.2])    Surgeon:  Malathi Bright MD Provider:  Mohsen Bryant MD    Anesthesia Type:  MAC ASA Status:  4 - Emergent          Anesthesia Type: MAC    Vitals  Vitals Value Taken Time   /52 3/28/2020 10:06 AM   Temp     Pulse 64 3/28/2020 10:07 AM   Resp     SpO2 100 % 3/28/2020 10:07 AM   Vitals shown include unvalidated device data.        Post Anesthesia Care and Evaluation    Patient location during evaluation: ICU  Level of consciousness: sleepy but conscious and confused (same as preop)  Pain management: adequate  Airway patency: patent  Anesthetic complications: No anesthetic complications  PONV Status: none  Cardiovascular status: acceptable and hemodynamically stable  Respiratory status: acceptable, nasal cannula and spontaneous ventilation  Hydration status: acceptable    Comments: /52   Pulse 66   Temp 36.8 °C (98.3 °F)   Resp 12   Ht 165.1 cm (65\")   Wt 59.9 kg (132 lb 0.9 oz)   SpO2 100%   BMI 21.98 kg/m²   RG to ICU RN      "

## 2020-03-28 NOTE — ANESTHESIA PREPROCEDURE EVALUATION
Anesthesia Evaluation     NPO Solid Status: Waived due to emergency  NPO Liquid Status: Waived due to emergency           Airway   Dental      Pulmonary - negative pulmonary ROS and normal exam   Cardiovascular - normal exam    ECG reviewed  PT is on anticoagulation therapy    (+) hypertension, past MI , CAD, hyperlipidemia,       Neuro/Psych  (+) CVA, psychiatric history,     GI/Hepatic/Renal/Endo    (+)  GI bleeding active bleeding, diabetes mellitus,     Musculoskeletal     Abdominal    Substance History - negative use     OB/GYN          Other   arthritis,          Phys Exam Other: Unable to obtain                Anesthesia Plan    ASA 4 - emergent     MAC     intravenous induction     Anesthetic plan, all risks, benefits, and alternatives have been provided, discussed and informed consent has been obtained with: healthcare power of .

## 2020-03-29 LAB
ALBUMIN SERPL-MCNC: 2 G/DL (ref 3.5–5.2)
ALBUMIN/GLOB SERPL: 1 G/DL
ALP SERPL-CCNC: 388 U/L (ref 39–117)
ALT SERPL W P-5'-P-CCNC: 84 U/L (ref 1–33)
ANION GAP SERPL CALCULATED.3IONS-SCNC: 15.1 MMOL/L (ref 5–15)
AST SERPL-CCNC: 105 U/L (ref 1–32)
BILIRUB SERPL-MCNC: 4.5 MG/DL (ref 0.2–1.2)
BUN BLD-MCNC: 111 MG/DL (ref 8–23)
BUN/CREAT SERPL: 19.4 (ref 7–25)
CALCIUM SPEC-SCNC: 6.3 MG/DL (ref 8.6–10.5)
CHLORIDE SERPL-SCNC: 89 MMOL/L (ref 98–107)
CO2 SERPL-SCNC: 32.9 MMOL/L (ref 22–29)
CREAT BLD-MCNC: 5.73 MG/DL (ref 0.57–1)
GFR SERPL CREATININE-BSD FRML MDRD: 7 ML/MIN/1.73
GFR SERPL CREATININE-BSD FRML MDRD: ABNORMAL ML/MIN/{1.73_M2}
GLOBULIN UR ELPH-MCNC: 2 GM/DL
GLUCOSE BLD-MCNC: 107 MG/DL (ref 65–99)
GLUCOSE BLDC GLUCOMTR-MCNC: 127 MG/DL (ref 70–130)
GLUCOSE BLDC GLUCOMTR-MCNC: 181 MG/DL (ref 70–130)
GLUCOSE BLDC GLUCOMTR-MCNC: 199 MG/DL (ref 70–130)
GLUCOSE BLDC GLUCOMTR-MCNC: 42 MG/DL (ref 70–130)
GLUCOSE BLDC GLUCOMTR-MCNC: 63 MG/DL (ref 70–130)
GLUCOSE BLDC GLUCOMTR-MCNC: 85 MG/DL (ref 70–130)
GLUCOSE BLDC GLUCOMTR-MCNC: 94 MG/DL (ref 70–130)
HCT VFR BLD AUTO: 28.7 % (ref 34–46.6)
HCT VFR BLD AUTO: 28.7 % (ref 34–46.6)
HCT VFR BLD AUTO: 30.4 % (ref 34–46.6)
HCT VFR BLD AUTO: 30.7 % (ref 34–46.6)
HGB BLD-MCNC: 10 G/DL (ref 12–15.9)
HGB BLD-MCNC: 10 G/DL (ref 12–15.9)
HGB BLD-MCNC: 10.2 G/DL (ref 12–15.9)
HGB BLD-MCNC: 10.5 G/DL (ref 12–15.9)
POTASSIUM BLD-SCNC: 4.1 MMOL/L (ref 3.5–5.2)
PROT SERPL-MCNC: 4 G/DL (ref 6–8.5)
SODIUM BLD-SCNC: 137 MMOL/L (ref 136–145)

## 2020-03-29 PROCEDURE — 85018 HEMOGLOBIN: CPT | Performed by: INTERNAL MEDICINE

## 2020-03-29 PROCEDURE — 82962 GLUCOSE BLOOD TEST: CPT

## 2020-03-29 PROCEDURE — 25010000002 CEFTRIAXONE PER 250 MG: Performed by: INTERNAL MEDICINE

## 2020-03-29 PROCEDURE — 63710000001 INSULIN LISPRO (HUMAN) PER 5 UNITS: Performed by: INTERNAL MEDICINE

## 2020-03-29 PROCEDURE — 80053 COMPREHEN METABOLIC PANEL: CPT | Performed by: INTERNAL MEDICINE

## 2020-03-29 PROCEDURE — 85014 HEMATOCRIT: CPT | Performed by: INTERNAL MEDICINE

## 2020-03-29 PROCEDURE — 99232 SBSQ HOSP IP/OBS MODERATE 35: CPT | Performed by: INTERNAL MEDICINE

## 2020-03-29 PROCEDURE — 83615 LACTATE (LD) (LDH) ENZYME: CPT | Performed by: INTERNAL MEDICINE

## 2020-03-29 RX ADMIN — SODIUM CHLORIDE 8 MG/HR: 900 INJECTION INTRAVENOUS at 08:29

## 2020-03-29 RX ADMIN — SODIUM CHLORIDE 8 MG/HR: 900 INJECTION INTRAVENOUS at 18:05

## 2020-03-29 RX ADMIN — SODIUM BICARBONATE 150 MEQ/1,000 ML IN DEXTROSE 5 % INTRAVENOUS 150 MEQ: SOLUTION at 06:08

## 2020-03-29 RX ADMIN — INSULIN LISPRO 2 UNITS: 100 INJECTION, SOLUTION INTRAVENOUS; SUBCUTANEOUS at 06:08

## 2020-03-29 RX ADMIN — CEFTRIAXONE SODIUM 1 G: 1 INJECTION, SOLUTION INTRAVENOUS at 20:15

## 2020-03-29 RX ADMIN — SODIUM CHLORIDE 8 MG/HR: 900 INJECTION INTRAVENOUS at 02:57

## 2020-03-29 RX ADMIN — SODIUM CHLORIDE 8 MG/HR: 900 INJECTION INTRAVENOUS at 23:03

## 2020-03-29 RX ADMIN — DEXTROSE MONOHYDRATE 25 G: 25 INJECTION, SOLUTION INTRAVENOUS at 11:46

## 2020-03-29 RX ADMIN — INSULIN LISPRO 2 UNITS: 100 INJECTION, SOLUTION INTRAVENOUS; SUBCUTANEOUS at 08:49

## 2020-03-30 ENCOUNTER — APPOINTMENT (OUTPATIENT)
Dept: CT IMAGING | Facility: HOSPITAL | Age: 75
End: 2020-03-30

## 2020-03-30 LAB
ALBUMIN SERPL-MCNC: 1.9 G/DL (ref 3.5–5.2)
ALBUMIN/GLOB SERPL: 0.8 G/DL
ALP SERPL-CCNC: 415 U/L (ref 39–117)
ALT SERPL W P-5'-P-CCNC: 80 U/L (ref 1–33)
ANION GAP SERPL CALCULATED.3IONS-SCNC: 15.4 MMOL/L (ref 5–15)
ANISOCYTOSIS BLD QL: ABNORMAL
APTT PPP: 46.6 SECONDS (ref 22.7–35.4)
AST SERPL-CCNC: 110 U/L (ref 1–32)
BILIRUB SERPL-MCNC: 5.6 MG/DL (ref 0.2–1.2)
BUN BLD-MCNC: 112 MG/DL (ref 8–23)
BUN/CREAT SERPL: 19.5 (ref 7–25)
BURR CELLS BLD QL SMEAR: ABNORMAL
CALCIUM SPEC-SCNC: 6.7 MG/DL (ref 8.6–10.5)
CHLORIDE SERPL-SCNC: 89 MMOL/L (ref 98–107)
CO2 SERPL-SCNC: 30.6 MMOL/L (ref 22–29)
CREAT BLD-MCNC: 5.75 MG/DL (ref 0.57–1)
CRP SERPL-MCNC: 17.37 MG/DL (ref 0–0.5)
D DIMER PPP FEU-MCNC: 1.84 MCGFEU/ML (ref 0–0.49)
DEPRECATED RDW RBC AUTO: 49.9 FL (ref 37–54)
ELLIPTOCYTES BLD QL SMEAR: ABNORMAL
ERYTHROCYTE [DISTWIDTH] IN BLOOD BY AUTOMATED COUNT: 16.7 % (ref 12.3–15.4)
ERYTHROCYTE [SEDIMENTATION RATE] IN BLOOD: 23 MM/HR (ref 0–30)
FERRITIN SERPL-MCNC: 765 NG/ML (ref 13–150)
FIBRINOGEN PPP-MCNC: 352 MG/DL (ref 219–464)
FOLATE SERPL-MCNC: 7.36 NG/ML (ref 4.78–24.2)
GFR SERPL CREATININE-BSD FRML MDRD: 7 ML/MIN/1.73
GFR SERPL CREATININE-BSD FRML MDRD: ABNORMAL ML/MIN/{1.73_M2}
GLOBULIN UR ELPH-MCNC: 2.3 GM/DL
GLUCOSE BLD-MCNC: 126 MG/DL (ref 65–99)
GLUCOSE BLDC GLUCOMTR-MCNC: 123 MG/DL (ref 70–130)
GLUCOSE BLDC GLUCOMTR-MCNC: 133 MG/DL (ref 70–130)
GLUCOSE BLDC GLUCOMTR-MCNC: 140 MG/DL (ref 70–130)
GLUCOSE BLDC GLUCOMTR-MCNC: 146 MG/DL (ref 70–130)
GLUCOSE BLDC GLUCOMTR-MCNC: 147 MG/DL (ref 70–130)
GLUCOSE BLDC GLUCOMTR-MCNC: 150 MG/DL (ref 70–130)
HAV IGM SERPL QL IA: NORMAL
HBV CORE IGM SERPL QL IA: NORMAL
HBV SURFACE AG SERPL QL IA: NORMAL
HCT VFR BLD AUTO: 27.9 % (ref 34–46.6)
HCT VFR BLD AUTO: 28.4 % (ref 34–46.6)
HCV AB SER DONR QL: NORMAL
HGB BLD-MCNC: 9.6 G/DL (ref 12–15.9)
HGB BLD-MCNC: 9.8 G/DL (ref 12–15.9)
HOLD SPECIMEN: NORMAL
IGA1 MFR SER: 269 MG/DL (ref 70–400)
IGG1 SER-MCNC: 653 MG/DL (ref 700–1600)
IGM SERPL-MCNC: 79 MG/DL (ref 40–230)
INR PPP: 1.62 (ref 0.9–1.1)
IRON 24H UR-MRATE: 80 MCG/DL (ref 37–145)
IRON SATN MFR SERPL: 51 % (ref 20–50)
LDH SERPL-CCNC: 475 U/L (ref 135–214)
LDH SERPL-CCNC: 538 U/L (ref 135–214)
LYMPHOCYTES # BLD MANUAL: 0.49 10*3/MM3 (ref 0.7–3.1)
LYMPHOCYTES NFR BLD MANUAL: 1 % (ref 5–12)
LYMPHOCYTES NFR BLD MANUAL: 3 % (ref 19.6–45.3)
MCH RBC QN AUTO: 28.9 PG (ref 26.6–33)
MCHC RBC AUTO-ENTMCNC: 35.1 G/DL (ref 31.5–35.7)
MCV RBC AUTO: 82.3 FL (ref 79–97)
MONOCYTES # BLD AUTO: 0.16 10*3/MM3 (ref 0.1–0.9)
NEUTROPHILS # BLD AUTO: 15.3 10*3/MM3 (ref 1.7–7)
NEUTROPHILS NFR BLD MANUAL: 94 % (ref 42.7–76)
OVALOCYTES BLD QL SMEAR: ABNORMAL
PLAT MORPH BLD: NORMAL
PLATELET # BLD AUTO: 45 10*3/MM3 (ref 140–450)
PLATELET # BLD AUTO: 50 10*3/MM3 (ref 140–450)
PLATELETS.RETICULATED NFR BLD AUTO: 4.6 % (ref 0.9–6.5)
PMV BLD AUTO: 10.6 FL (ref 6–12)
POIKILOCYTOSIS BLD QL SMEAR: ABNORMAL
POTASSIUM BLD-SCNC: 4.4 MMOL/L (ref 3.5–5.2)
PROCALCITONIN SERPL-MCNC: 1.58 NG/ML (ref 0.1–0.25)
PROT SERPL-MCNC: 4.2 G/DL (ref 6–8.5)
PROTHROMBIN TIME: 18.9 SECONDS (ref 11.7–14.2)
RBC # BLD AUTO: 3.39 10*6/MM3 (ref 3.77–5.28)
SCHISTOCYTES BLD QL SMEAR: ABNORMAL
SODIUM BLD-SCNC: 135 MMOL/L (ref 136–145)
TIBC SERPL-MCNC: 158 MCG/DL (ref 298–536)
TRANSFERRIN SERPL-MCNC: 106 MG/DL (ref 200–360)
WBC MORPH BLD: NORMAL
WBC NRBC COR # BLD: 16.28 10*3/MM3 (ref 3.4–10.8)

## 2020-03-30 PROCEDURE — 86696 HERPES SIMPLEX TYPE 2 TEST: CPT | Performed by: INTERNAL MEDICINE

## 2020-03-30 PROCEDURE — 99223 1ST HOSP IP/OBS HIGH 75: CPT | Performed by: INTERNAL MEDICINE

## 2020-03-30 PROCEDURE — 82962 GLUCOSE BLOOD TEST: CPT

## 2020-03-30 PROCEDURE — 84145 PROCALCITONIN (PCT): CPT | Performed by: INTERNAL MEDICINE

## 2020-03-30 PROCEDURE — 99232 SBSQ HOSP IP/OBS MODERATE 35: CPT | Performed by: INTERNAL MEDICINE

## 2020-03-30 PROCEDURE — 82728 ASSAY OF FERRITIN: CPT | Performed by: INTERNAL MEDICINE

## 2020-03-30 PROCEDURE — 86644 CMV ANTIBODY: CPT | Performed by: INTERNAL MEDICINE

## 2020-03-30 PROCEDURE — 84165 PROTEIN E-PHORESIS SERUM: CPT | Performed by: INTERNAL MEDICINE

## 2020-03-30 PROCEDURE — 82746 ASSAY OF FOLIC ACID SERUM: CPT | Performed by: INTERNAL MEDICINE

## 2020-03-30 PROCEDURE — 82784 ASSAY IGA/IGD/IGG/IGM EACH: CPT | Performed by: INTERNAL MEDICINE

## 2020-03-30 PROCEDURE — 85018 HEMOGLOBIN: CPT | Performed by: INTERNAL MEDICINE

## 2020-03-30 PROCEDURE — 83540 ASSAY OF IRON: CPT | Performed by: INTERNAL MEDICINE

## 2020-03-30 PROCEDURE — 84466 ASSAY OF TRANSFERRIN: CPT | Performed by: INTERNAL MEDICINE

## 2020-03-30 PROCEDURE — 85730 THROMBOPLASTIN TIME PARTIAL: CPT | Performed by: INTERNAL MEDICINE

## 2020-03-30 PROCEDURE — 85384 FIBRINOGEN ACTIVITY: CPT | Performed by: INTERNAL MEDICINE

## 2020-03-30 PROCEDURE — 85652 RBC SED RATE AUTOMATED: CPT | Performed by: INTERNAL MEDICINE

## 2020-03-30 PROCEDURE — 85610 PROTHROMBIN TIME: CPT | Performed by: INTERNAL MEDICINE

## 2020-03-30 PROCEDURE — 80053 COMPREHEN METABOLIC PANEL: CPT | Performed by: INTERNAL MEDICINE

## 2020-03-30 PROCEDURE — 87040 BLOOD CULTURE FOR BACTERIA: CPT | Performed by: INTERNAL MEDICINE

## 2020-03-30 PROCEDURE — 25010000002 CEFTRIAXONE PER 250 MG: Performed by: INTERNAL MEDICINE

## 2020-03-30 PROCEDURE — 86665 EPSTEIN-BARR CAPSID VCA: CPT | Performed by: INTERNAL MEDICINE

## 2020-03-30 PROCEDURE — 83883 ASSAY NEPHELOMETRY NOT SPEC: CPT | Performed by: INTERNAL MEDICINE

## 2020-03-30 PROCEDURE — 74176 CT ABD & PELVIS W/O CONTRAST: CPT

## 2020-03-30 PROCEDURE — 80074 ACUTE HEPATITIS PANEL: CPT | Performed by: INTERNAL MEDICINE

## 2020-03-30 PROCEDURE — 86334 IMMUNOFIX E-PHORESIS SERUM: CPT | Performed by: INTERNAL MEDICINE

## 2020-03-30 PROCEDURE — 99222 1ST HOSP IP/OBS MODERATE 55: CPT | Performed by: SURGERY

## 2020-03-30 PROCEDURE — 85025 COMPLETE CBC W/AUTO DIFF WBC: CPT | Performed by: INTERNAL MEDICINE

## 2020-03-30 PROCEDURE — 85379 FIBRIN DEGRADATION QUANT: CPT | Performed by: INTERNAL MEDICINE

## 2020-03-30 PROCEDURE — 86695 HERPES SIMPLEX TYPE 1 TEST: CPT | Performed by: INTERNAL MEDICINE

## 2020-03-30 PROCEDURE — 86140 C-REACTIVE PROTEIN: CPT | Performed by: INTERNAL MEDICINE

## 2020-03-30 PROCEDURE — 85055 RETICULATED PLATELET ASSAY: CPT | Performed by: INTERNAL MEDICINE

## 2020-03-30 PROCEDURE — 83615 LACTATE (LD) (LDH) ENZYME: CPT | Performed by: INTERNAL MEDICINE

## 2020-03-30 PROCEDURE — 85007 BL SMEAR W/DIFF WBC COUNT: CPT | Performed by: INTERNAL MEDICINE

## 2020-03-30 PROCEDURE — 86645 CMV ANTIBODY IGM: CPT | Performed by: INTERNAL MEDICINE

## 2020-03-30 PROCEDURE — 85014 HEMATOCRIT: CPT | Performed by: INTERNAL MEDICINE

## 2020-03-30 RX ORDER — SODIUM CHLORIDE 9 MG/ML
100 INJECTION, SOLUTION INTRAVENOUS CONTINUOUS
Status: DISCONTINUED | OUTPATIENT
Start: 2020-03-30 | End: 2020-03-31

## 2020-03-30 RX ADMIN — CEFTRIAXONE SODIUM 1 G: 1 INJECTION, SOLUTION INTRAVENOUS at 20:18

## 2020-03-30 RX ADMIN — DEXTROSE MONOHYDRATE 25 G: 25 INJECTION, SOLUTION INTRAVENOUS at 00:33

## 2020-03-30 RX ADMIN — SODIUM CHLORIDE 8 MG/HR: 900 INJECTION INTRAVENOUS at 04:43

## 2020-03-30 RX ADMIN — Medication 15 ML: at 20:22

## 2020-03-30 RX ADMIN — SODIUM CHLORIDE 8 MG/HR: 900 INJECTION INTRAVENOUS at 16:08

## 2020-03-30 RX ADMIN — SODIUM CHLORIDE 8 MG/HR: 900 INJECTION INTRAVENOUS at 09:08

## 2020-03-30 RX ADMIN — SODIUM CHLORIDE 100 ML/HR: 9 INJECTION, SOLUTION INTRAVENOUS at 16:09

## 2020-03-31 ENCOUNTER — APPOINTMENT (OUTPATIENT)
Dept: CARDIOLOGY | Facility: HOSPITAL | Age: 75
End: 2020-03-31

## 2020-03-31 ENCOUNTER — APPOINTMENT (OUTPATIENT)
Dept: CT IMAGING | Facility: HOSPITAL | Age: 75
End: 2020-03-31

## 2020-03-31 LAB
ALBUMIN SERPL-MCNC: 1.9 G/DL (ref 2.9–4.4)
ALBUMIN SERPL-MCNC: 2 G/DL (ref 3.5–5.2)
ALBUMIN/GLOB SERPL: 0.7 G/DL
ALBUMIN/GLOB SERPL: 0.9 {RATIO} (ref 0.7–1.7)
ALP SERPL-CCNC: 461 U/L (ref 39–117)
ALPHA1 GLOB FLD ELPH-MCNC: 0.2 G/DL (ref 0–0.4)
ALPHA2 GLOB SERPL ELPH-MCNC: 0.4 G/DL (ref 0.4–1)
ALT SERPL W P-5'-P-CCNC: 73 U/L (ref 1–33)
AMMONIA BLD-SCNC: 31 UMOL/L (ref 11–51)
ANION GAP SERPL CALCULATED.3IONS-SCNC: 17.9 MMOL/L (ref 5–15)
AST SERPL-CCNC: 97 U/L (ref 1–32)
B-GLOBULIN SERPL ELPH-MCNC: 0.9 G/DL (ref 0.7–1.3)
BASOPHILS # BLD AUTO: 0.06 10*3/MM3 (ref 0–0.2)
BASOPHILS NFR BLD AUTO: 0.3 % (ref 0–1.5)
BH CV VAS CAROTID LEFT DISTAL STENT EDV: 8.5 CM/S
BH CV VAS CAROTID LEFT DISTAL STENT: 74.1 CM/S
BH CV VAS CAROTID LEFT DISTAL TO STENT EDV: 11.9 CM/S
BH CV VAS CAROTID LEFT DISTAL TO STENT: 79.2 CM/S
BH CV VAS CAROTID LEFT MID STENT EDV: 17.3 CM/S
BH CV VAS CAROTID LEFT MID STENT: 102 CM/S
BH CV VAS CAROTID LEFT PROXIMAL STENT EDV: 6.9 CM/S
BH CV VAS CAROTID LEFT PROXIMAL STENT: 96.2 CM/S
BH CV VAS CAROTID LEFT PROXIMAL TO STENT: 88.4 CM/S
BH CV VAS CAROTID LEFT STENT NATIVE VESSEL PROXIMAL ED: 16.5 CM/S
BH CV XLRA MEAS LEFT DIST CCA EDV: 11 CM/SEC
BH CV XLRA MEAS LEFT DIST CCA PSV: 74.9 CM/SEC
BH CV XLRA MEAS LEFT DIST ICA EDV: -13.5 CM/SEC
BH CV XLRA MEAS LEFT DIST ICA PSV: -71.4 CM/SEC
BH CV XLRA MEAS LEFT MID ICA EDV: -12.6 CM/SEC
BH CV XLRA MEAS LEFT MID ICA PSV: -98.8 CM/SEC
BH CV XLRA MEAS LEFT PROX CCA PSV: -58.5 CM/SEC
BH CV XLRA MEAS LEFT PROX ECA EDV: -20.3 CM/SEC
BH CV XLRA MEAS LEFT PROX ECA PSV: -154 CM/SEC
BH CV XLRA MEAS LEFT PROX ICA EDV: -13.6 CM/SEC
BH CV XLRA MEAS LEFT PROX ICA PSV: -95.9 CM/SEC
BH CV XLRA MEAS LEFT PROX SCLA PSV: 171 CM/SEC
BH CV XLRA MEAS LEFT VERTEBRAL A EDV: 10.2 CM/SEC
BH CV XLRA MEAS LEFT VERTEBRAL A PSV: 51.7 CM/SEC
BH CV XLRA MEAS RIGHT DIST CCA EDV: -13 CM/SEC
BH CV XLRA MEAS RIGHT DIST CCA PSV: -59 CM/SEC
BH CV XLRA MEAS RIGHT DIST ICA EDV: -15.2 CM/SEC
BH CV XLRA MEAS RIGHT DIST ICA PSV: -70.1 CM/SEC
BH CV XLRA MEAS RIGHT MID ICA EDV: -13.3 CM/SEC
BH CV XLRA MEAS RIGHT MID ICA PSV: -66.4 CM/SEC
BH CV XLRA MEAS RIGHT PROX CCA EDV: 18 CM/SEC
BH CV XLRA MEAS RIGHT PROX CCA PSV: 85.1 CM/SEC
BH CV XLRA MEAS RIGHT PROX ECA PSV: 83.3 CM/SEC
BH CV XLRA MEAS RIGHT PROX ICA EDV: 25.9 CM/SEC
BH CV XLRA MEAS RIGHT PROX ICA PSV: 150 CM/SEC
BH CV XLRA MEAS RIGHT PROX SCLA PSV: 139.4 CM/SEC
BH CV XLRA MEAS RIGHT VERTEBRAL A EDV: -11.8 CM/SEC
BH CV XLRA MEAS RIGHT VERTEBRAL A PSV: -68.2 CM/SEC
BH CVPROX LEFT ICA HIDDEN LRR: 1 CM
BILIRUB SERPL-MCNC: 5.7 MG/DL (ref 0.2–1.2)
BUN BLD-MCNC: 119 MG/DL (ref 8–23)
BUN/CREAT SERPL: 20.3 (ref 7–25)
CALCIUM SPEC-SCNC: 6.8 MG/DL (ref 8.6–10.5)
CHLORIDE SERPL-SCNC: 93 MMOL/L (ref 98–107)
CMV IGG SERPL IA-ACNC: <0.6 U/ML (ref 0–0.59)
CMV IGM SERPL IA-ACNC: <30 AU/ML (ref 0–29.9)
CO2 SERPL-SCNC: 27.1 MMOL/L (ref 22–29)
CREAT BLD-MCNC: 5.87 MG/DL (ref 0.57–1)
DEPRECATED RDW RBC AUTO: 49.3 FL (ref 37–54)
EBV VCA IGG SER-ACNC: >600 U/ML (ref 0–17.9)
EBV VCA IGM SER-ACNC: <36 U/ML (ref 0–35.9)
EOSINOPHIL # BLD AUTO: 0.01 10*3/MM3 (ref 0–0.4)
EOSINOPHIL NFR BLD AUTO: 0 % (ref 0.3–6.2)
ERYTHROCYTE [DISTWIDTH] IN BLOOD BY AUTOMATED COUNT: 16.2 % (ref 12.3–15.4)
GAMMA GLOB SERPL ELPH-MCNC: 0.7 G/DL (ref 0.4–1.8)
GFR SERPL CREATININE-BSD FRML MDRD: 7 ML/MIN/1.73
GFR SERPL CREATININE-BSD FRML MDRD: ABNORMAL ML/MIN/{1.73_M2}
GLOBULIN SER CALC-MCNC: 2.2 G/DL (ref 2.2–3.9)
GLOBULIN UR ELPH-MCNC: 2.9 GM/DL
GLUCOSE BLD-MCNC: 121 MG/DL (ref 65–99)
GLUCOSE BLDC GLUCOMTR-MCNC: 117 MG/DL (ref 70–130)
GLUCOSE BLDC GLUCOMTR-MCNC: 136 MG/DL (ref 70–130)
GLUCOSE BLDC GLUCOMTR-MCNC: 146 MG/DL (ref 70–130)
GLUCOSE BLDC GLUCOMTR-MCNC: 147 MG/DL (ref 70–130)
GLUCOSE BLDC GLUCOMTR-MCNC: 154 MG/DL (ref 70–130)
GLUCOSE BLDC GLUCOMTR-MCNC: 166 MG/DL (ref 70–130)
HCT VFR BLD AUTO: 30.1 % (ref 34–46.6)
HGB BLD-MCNC: 10.2 G/DL (ref 12–15.9)
HSV1 IGG SER IA-ACNC: 31.1 INDEX (ref 0–0.9)
HSV2 IGG SER IA-ACNC: <0.91 INDEX (ref 0–0.9)
IGA SERPL-MCNC: 277 MG/DL (ref 64–422)
IGG SERPL-MCNC: 690 MG/DL (ref 586–1602)
IGM SERPL-MCNC: 86 MG/DL (ref 26–217)
IMM GRANULOCYTES # BLD AUTO: 0.24 10*3/MM3 (ref 0–0.05)
IMM GRANULOCYTES NFR BLD AUTO: 1.2 % (ref 0–0.5)
INR PPP: 1.73 (ref 0.9–1.1)
INTERPRETATION SERPL IEP-IMP: ABNORMAL
KAPPA LC SERPL-MCNC: 119.3 MG/L (ref 3.3–19.4)
KAPPA LC/LAMBDA SER: 1.76 {RATIO} (ref 0.26–1.65)
LAMBDA LC FREE SERPL-MCNC: 67.8 MG/L (ref 5.7–26.3)
LEFT ARM BP: NORMAL MMHG
LYMPHOCYTES # BLD AUTO: 0.88 10*3/MM3 (ref 0.7–3.1)
LYMPHOCYTES NFR BLD AUTO: 4.3 % (ref 19.6–45.3)
Lab: ABNORMAL
M-SPIKE: ABNORMAL G/DL
MCH RBC QN AUTO: 28.3 PG (ref 26.6–33)
MCHC RBC AUTO-ENTMCNC: 33.9 G/DL (ref 31.5–35.7)
MCV RBC AUTO: 83.6 FL (ref 79–97)
MONOCYTES # BLD AUTO: 0.41 10*3/MM3 (ref 0.1–0.9)
MONOCYTES NFR BLD AUTO: 2 % (ref 5–12)
NEUTROPHILS # BLD AUTO: 19.06 10*3/MM3 (ref 1.7–7)
NEUTROPHILS NFR BLD AUTO: 92.2 % (ref 42.7–76)
NRBC BLD AUTO-RTO: 0 /100 WBC (ref 0–0.2)
PLATELET # BLD AUTO: 87 10*3/MM3 (ref 140–450)
PMV BLD AUTO: 11.8 FL (ref 6–12)
POTASSIUM BLD-SCNC: 4.1 MMOL/L (ref 3.5–5.2)
PROT SERPL-MCNC: 4.1 G/DL (ref 6–8.5)
PROT SERPL-MCNC: 4.9 G/DL (ref 6–8.5)
PROTHROMBIN TIME: 19.9 SECONDS (ref 11.7–14.2)
RBC # BLD AUTO: 3.6 10*6/MM3 (ref 3.77–5.28)
SODIUM BLD-SCNC: 138 MMOL/L (ref 136–145)
WBC NRBC COR # BLD: 20.66 10*3/MM3 (ref 3.4–10.8)

## 2020-03-31 PROCEDURE — 99232 SBSQ HOSP IP/OBS MODERATE 35: CPT | Performed by: INTERNAL MEDICINE

## 2020-03-31 PROCEDURE — 63710000001 INSULIN LISPRO (HUMAN) PER 5 UNITS: Performed by: INTERNAL MEDICINE

## 2020-03-31 PROCEDURE — 85610 PROTHROMBIN TIME: CPT | Performed by: INTERNAL MEDICINE

## 2020-03-31 PROCEDURE — G0008 ADMIN INFLUENZA VIRUS VAC: HCPCS | Performed by: INTERNAL MEDICINE

## 2020-03-31 PROCEDURE — 82140 ASSAY OF AMMONIA: CPT | Performed by: INTERNAL MEDICINE

## 2020-03-31 PROCEDURE — 25010000002 INFLUENZA VAC SPLIT QUAD 0.5 ML SUSPENSION PREFILLED SYRINGE: Performed by: INTERNAL MEDICINE

## 2020-03-31 PROCEDURE — 70450 CT HEAD/BRAIN W/O DYE: CPT

## 2020-03-31 PROCEDURE — 99221 1ST HOSP IP/OBS SF/LOW 40: CPT | Performed by: PSYCHIATRY & NEUROLOGY

## 2020-03-31 PROCEDURE — 99233 SBSQ HOSP IP/OBS HIGH 50: CPT | Performed by: INTERNAL MEDICINE

## 2020-03-31 PROCEDURE — 90686 IIV4 VACC NO PRSV 0.5 ML IM: CPT | Performed by: INTERNAL MEDICINE

## 2020-03-31 PROCEDURE — 99223 1ST HOSP IP/OBS HIGH 75: CPT | Performed by: INTERNAL MEDICINE

## 2020-03-31 PROCEDURE — 93880 EXTRACRANIAL BILAT STUDY: CPT

## 2020-03-31 PROCEDURE — 82962 GLUCOSE BLOOD TEST: CPT

## 2020-03-31 PROCEDURE — 80053 COMPREHEN METABOLIC PANEL: CPT | Performed by: INTERNAL MEDICINE

## 2020-03-31 PROCEDURE — 85025 COMPLETE CBC W/AUTO DIFF WBC: CPT | Performed by: INTERNAL MEDICINE

## 2020-03-31 PROCEDURE — 25010000002 CEFTRIAXONE PER 250 MG: Performed by: INTERNAL MEDICINE

## 2020-03-31 RX ADMIN — INSULIN LISPRO 2 UNITS: 100 INJECTION, SOLUTION INTRAVENOUS; SUBCUTANEOUS at 01:54

## 2020-03-31 RX ADMIN — INFLUENZA A VIRUS A/BRISBANE/02/2018 IVR-190 (H1N1) ANTIGEN (PROPIOLACTONE INACTIVATED), INFLUENZA A VIRUS A/KANSAS/14/2017 X-327 (H3N2) ANTIGEN (PROPIOLACTONE INACTIVATED), INFLUENZA B VIRUS B/MARYLAND/15/2016 ANTIGEN (PROPIOLACTONE INACTIVATED), INFLUENZA B VIRUS B/PHUKET/3073/2013 BVR-1B ANTIGEN (PROPIOLACTONE INACTIVATED) 0.5 ML: 15; 15; 15; 15 INJECTION, SUSPENSION INTRAMUSCULAR at 11:54

## 2020-03-31 RX ADMIN — SODIUM CHLORIDE 8 MG/HR: 900 INJECTION INTRAVENOUS at 20:14

## 2020-03-31 RX ADMIN — SODIUM CHLORIDE 100 ML/HR: 9 INJECTION, SOLUTION INTRAVENOUS at 02:29

## 2020-03-31 RX ADMIN — CEFTRIAXONE SODIUM 1 G: 1 INJECTION, SOLUTION INTRAVENOUS at 20:14

## 2020-03-31 RX ADMIN — SODIUM CHLORIDE 8 MG/HR: 900 INJECTION INTRAVENOUS at 02:29

## 2020-03-31 RX ADMIN — SODIUM CHLORIDE 8 MG/HR: 900 INJECTION INTRAVENOUS at 09:41

## 2020-03-31 RX ADMIN — SODIUM CHLORIDE 8 MG/HR: 900 INJECTION INTRAVENOUS at 14:39

## 2020-04-01 ENCOUNTER — APPOINTMENT (OUTPATIENT)
Dept: GENERAL RADIOLOGY | Facility: HOSPITAL | Age: 75
End: 2020-04-01

## 2020-04-01 PROBLEM — D69.6 THROMBOCYTOPENIA: Status: ACTIVE | Noted: 2020-04-01

## 2020-04-01 PROBLEM — E11.65 TYPE 2 DIABETES MELLITUS WITH HYPERGLYCEMIA (HCC): Status: ACTIVE | Noted: 2020-04-01

## 2020-04-01 PROBLEM — R57.8 HEMORRHAGIC SHOCK (HCC): Status: ACTIVE | Noted: 2020-04-01

## 2020-04-01 PROBLEM — N19 UREMIC ENCEPHALOPATHY: Status: ACTIVE | Noted: 2020-04-01

## 2020-04-01 PROBLEM — G93.49 UREMIC ENCEPHALOPATHY: Status: ACTIVE | Noted: 2020-04-01

## 2020-04-01 PROBLEM — F03.90 DEMENTIA WITHOUT BEHAVIORAL DISTURBANCE (HCC): Status: ACTIVE | Noted: 2020-04-01

## 2020-04-01 PROBLEM — D62 ACUTE POSTHEMORRHAGIC ANEMIA: Status: ACTIVE | Noted: 2020-04-01

## 2020-04-01 PROBLEM — N17.9 ACUTE KIDNEY INJURY (HCC): Status: ACTIVE | Noted: 2020-04-01

## 2020-04-01 PROBLEM — K85.90 ACUTE PANCREATITIS: Status: ACTIVE | Noted: 2020-04-01

## 2020-04-01 PROBLEM — A41.9 SEPSIS: Status: ACTIVE | Noted: 2020-04-01

## 2020-04-01 LAB
ALBUMIN SERPL-MCNC: 2.3 G/DL (ref 3.5–5.2)
ALBUMIN/GLOB SERPL: 1 G/DL
ALP SERPL-CCNC: 438 U/L (ref 39–117)
ALT SERPL W P-5'-P-CCNC: 59 U/L (ref 1–33)
ANION GAP SERPL CALCULATED.3IONS-SCNC: 19.4 MMOL/L (ref 5–15)
AST SERPL-CCNC: 86 U/L (ref 1–32)
BASOPHILS # BLD AUTO: 0.02 10*3/MM3 (ref 0–0.2)
BASOPHILS NFR BLD AUTO: 0.1 % (ref 0–1.5)
BILIRUB SERPL-MCNC: 4.6 MG/DL (ref 0.2–1.2)
BUN BLD-MCNC: 134 MG/DL (ref 8–23)
BUN/CREAT SERPL: 22.3 (ref 7–25)
CALCIUM SPEC-SCNC: 6.6 MG/DL (ref 8.6–10.5)
CHLORIDE SERPL-SCNC: 97 MMOL/L (ref 98–107)
CO2 SERPL-SCNC: 25.6 MMOL/L (ref 22–29)
CREAT BLD-MCNC: 6.02 MG/DL (ref 0.57–1)
DEPRECATED RDW RBC AUTO: 51.3 FL (ref 37–54)
EOSINOPHIL # BLD AUTO: 0.01 10*3/MM3 (ref 0–0.4)
EOSINOPHIL NFR BLD AUTO: 0.1 % (ref 0.3–6.2)
ERYTHROCYTE [DISTWIDTH] IN BLOOD BY AUTOMATED COUNT: 16.5 % (ref 12.3–15.4)
GFR SERPL CREATININE-BSD FRML MDRD: 7 ML/MIN/1.73
GFR SERPL CREATININE-BSD FRML MDRD: ABNORMAL ML/MIN/{1.73_M2}
GLOBULIN UR ELPH-MCNC: 2.2 GM/DL
GLUCOSE BLD-MCNC: 168 MG/DL (ref 65–99)
GLUCOSE BLDC GLUCOMTR-MCNC: 144 MG/DL (ref 70–130)
GLUCOSE BLDC GLUCOMTR-MCNC: 156 MG/DL (ref 70–130)
GLUCOSE BLDC GLUCOMTR-MCNC: 162 MG/DL (ref 70–130)
GLUCOSE BLDC GLUCOMTR-MCNC: 180 MG/DL (ref 70–130)
GLUCOSE BLDC GLUCOMTR-MCNC: 184 MG/DL (ref 70–130)
GLUCOSE BLDC GLUCOMTR-MCNC: 198 MG/DL (ref 70–130)
HCT VFR BLD AUTO: 25.8 % (ref 34–46.6)
HGB BLD-MCNC: 8.8 G/DL (ref 12–15.9)
IMM GRANULOCYTES # BLD AUTO: 0.13 10*3/MM3 (ref 0–0.05)
IMM GRANULOCYTES NFR BLD AUTO: 0.8 % (ref 0–0.5)
INR PPP: 1.79 (ref 0.9–1.1)
LIPASE SERPL-CCNC: 193 U/L (ref 13–60)
LYMPHOCYTES # BLD AUTO: 0.92 10*3/MM3 (ref 0.7–3.1)
LYMPHOCYTES NFR BLD AUTO: 5.8 % (ref 19.6–45.3)
MCH RBC QN AUTO: 28.8 PG (ref 26.6–33)
MCHC RBC AUTO-ENTMCNC: 34.1 G/DL (ref 31.5–35.7)
MCV RBC AUTO: 84.3 FL (ref 79–97)
MONOCYTES # BLD AUTO: 0.49 10*3/MM3 (ref 0.1–0.9)
MONOCYTES NFR BLD AUTO: 3.1 % (ref 5–12)
NEUTROPHILS # BLD AUTO: 14.26 10*3/MM3 (ref 1.7–7)
NEUTROPHILS NFR BLD AUTO: 90.1 % (ref 42.7–76)
NRBC BLD AUTO-RTO: 0 /100 WBC (ref 0–0.2)
PLATELET # BLD AUTO: 83 10*3/MM3 (ref 140–450)
PMV BLD AUTO: 10.8 FL (ref 6–12)
POTASSIUM BLD-SCNC: 3.8 MMOL/L (ref 3.5–5.2)
PROT SERPL-MCNC: 4.5 G/DL (ref 6–8.5)
PROTHROMBIN TIME: 20.5 SECONDS (ref 11.7–14.2)
RBC # BLD AUTO: 3.06 10*6/MM3 (ref 3.77–5.28)
SODIUM BLD-SCNC: 142 MMOL/L (ref 136–145)
WBC NRBC COR # BLD: 15.83 10*3/MM3 (ref 3.4–10.8)

## 2020-04-01 PROCEDURE — 05HY33Z INSERTION OF INFUSION DEVICE INTO UPPER VEIN, PERCUTANEOUS APPROACH: ICD-10-PCS | Performed by: SURGERY

## 2020-04-01 PROCEDURE — 85610 PROTHROMBIN TIME: CPT | Performed by: INTERNAL MEDICINE

## 2020-04-01 PROCEDURE — 85025 COMPLETE CBC W/AUTO DIFF WBC: CPT | Performed by: INTERNAL MEDICINE

## 2020-04-01 PROCEDURE — 63710000001 INSULIN LISPRO (HUMAN) PER 5 UNITS: Performed by: INTERNAL MEDICINE

## 2020-04-01 PROCEDURE — 83690 ASSAY OF LIPASE: CPT | Performed by: INTERNAL MEDICINE

## 2020-04-01 PROCEDURE — 25010000002 HEPARIN (PORCINE) PER 1000 UNITS: Performed by: SURGERY

## 2020-04-01 PROCEDURE — 25010000002 CEFTRIAXONE PER 250 MG: Performed by: INTERNAL MEDICINE

## 2020-04-01 PROCEDURE — 99232 SBSQ HOSP IP/OBS MODERATE 35: CPT | Performed by: INTERNAL MEDICINE

## 2020-04-01 PROCEDURE — 80053 COMPREHEN METABOLIC PANEL: CPT | Performed by: INTERNAL MEDICINE

## 2020-04-01 PROCEDURE — 99232 SBSQ HOSP IP/OBS MODERATE 35: CPT | Performed by: PSYCHIATRY & NEUROLOGY

## 2020-04-01 PROCEDURE — 82962 GLUCOSE BLOOD TEST: CPT

## 2020-04-01 RX ORDER — HEPARIN SODIUM 1000 [USP'U]/ML
3000 INJECTION, SOLUTION INTRAVENOUS; SUBCUTANEOUS ONCE
Status: COMPLETED | OUTPATIENT
Start: 2020-04-01 | End: 2020-04-01

## 2020-04-01 RX ORDER — HEPARIN SODIUM 1000 [USP'U]/ML
3000 INJECTION, SOLUTION INTRAVENOUS; SUBCUTANEOUS ONCE
Status: DISCONTINUED | OUTPATIENT
Start: 2020-04-01 | End: 2020-04-01

## 2020-04-01 RX ADMIN — INSULIN LISPRO 2 UNITS: 100 INJECTION, SOLUTION INTRAVENOUS; SUBCUTANEOUS at 20:47

## 2020-04-01 RX ADMIN — SODIUM CHLORIDE 8 MG/HR: 900 INJECTION INTRAVENOUS at 12:50

## 2020-04-01 RX ADMIN — SODIUM CHLORIDE 8 MG/HR: 900 INJECTION INTRAVENOUS at 17:53

## 2020-04-01 RX ADMIN — SODIUM CHLORIDE 8 MG/HR: 900 INJECTION INTRAVENOUS at 02:09

## 2020-04-01 RX ADMIN — Medication 15 ML: at 20:22

## 2020-04-01 RX ADMIN — INSULIN LISPRO 2 UNITS: 100 INJECTION, SOLUTION INTRAVENOUS; SUBCUTANEOUS at 02:09

## 2020-04-01 RX ADMIN — CEFTRIAXONE SODIUM 1 G: 1 INJECTION, SOLUTION INTRAVENOUS at 20:22

## 2020-04-01 RX ADMIN — SODIUM CHLORIDE 8 MG/HR: 900 INJECTION INTRAVENOUS at 07:32

## 2020-04-01 RX ADMIN — INSULIN LISPRO 2 UNITS: 100 INJECTION, SOLUTION INTRAVENOUS; SUBCUTANEOUS at 12:50

## 2020-04-01 RX ADMIN — HEPARIN SODIUM 3000 UNITS: 1000 INJECTION, SOLUTION INTRAVENOUS; SUBCUTANEOUS at 16:00

## 2020-04-01 NOTE — PROGRESS NOTES
"   LOS: 5 days    Patient Care Team:  Eric Matta MD as PCP - General (General Practice)    Chief Complaint:    Chief Complaint   Patient presents with   • Altered Mental Status   • Black or Bloody Stool     Follow UP KIERAN  Subjective     Interval History:     Seen and examined. Opens her eyes . Does not respond to commands. D/W RN and her     Review of Systems:   Unable to obtain.     Objective     Vital Signs  Temp:  [97.9 °F (36.6 °C)-98.8 °F (37.1 °C)] 98.6 °F (37 °C)  Heart Rate:  [64-73] 73  Resp:  [16-18] 16  BP: (137-181)/(71-91) 175/77    Flowsheet Rows      First Filed Value   Admission Height  165.1 cm (65\") Documented at 03/27/2020 1029   Admission Weight  60.3 kg (133 lb) Documented at 03/27/2020 1029          No intake/output data recorded.  I/O last 3 completed shifts:  In: -   Out: 1375 [Urine:1375]    Intake/Output Summary (Last 24 hours) at 4/1/2020 1043  Last data filed at 4/1/2020 0344  Gross per 24 hour   Intake --   Output 800 ml   Net -800 ml       Physical Exam:  Elderly wf.  Oral mucosa with signif dried and oozing blood.    Not verbal to me.  Dr. Bennett thought she said \"bye \" to him.  Neck no jvd  Heart RRR No s3 or rub  Lungs clear to auscultation, no wheezing  Abd Distended, slightly tender, no guarding or rebound  Body wall edema  Ext 1+ upper and lower ext edema.       Results Review:    Results from last 7 days   Lab Units 04/01/20  0520 03/31/20  0511 03/30/20  0632   SODIUM mmol/L 142 138 135*   POTASSIUM mmol/L 3.8 4.1 4.4   CHLORIDE mmol/L 97* 93* 89*   CO2 mmol/L 25.6 27.1 30.6*   BUN mg/dL 134* 119* 112*   CREATININE mg/dL 6.02* 5.87* 5.75*   CALCIUM mg/dL 6.6* 6.8* 6.7*   BILIRUBIN mg/dL 4.6* 5.7* 5.6*   ALK PHOS U/L 438* 461* 415*   ALT (SGPT) U/L 59* 73* 80*   AST (SGOT) U/L 86* 97* 110*   GLUCOSE mg/dL 168* 121* 126*       Estimated Creatinine Clearance: 8.2 mL/min (A) (by C-G formula based on SCr of 6.02 mg/dL (H)).                Results from last 7 days   Lab Units " 04/01/20  0520 03/31/20  0511 03/30/20  1428 03/30/20  0632 03/30/20  0034 03/29/20  1803  03/28/20  0430  03/27/20  1033   WBC 10*3/mm3 15.83* 20.66*  --  16.28*  --   --   --  11.76*  --  12.50*   HEMOGLOBIN g/dL 8.8* 10.2*  --  9.8* 9.6* 10.0*   < > 10.5*   < > 10.4*   PLATELETS 10*3/mm3 83* 87* 45* 50*  --   --   --  98*  --  159    < > = values in this interval not displayed.       Results from last 7 days   Lab Units 04/01/20  0520 03/31/20  1440 03/30/20  1536   INR  1.79* 1.73* 1.62*         Imaging Results (Last 24 Hours)     Procedure Component Value Units Date/Time    CT Head Without Contrast [[id number]] Collected:  03/31/20 1419     Updated:  03/31/20 1419    Narrative:       CT HEAD WITHOUT CONTRAST     HISTORY:  Altered mental status.     COMPARISON: CT head 08/11/2019.     FINDINGS: A remote infarct is appreciated involving the left parietal  lobe posteriorly and the temporal occipital region inferior laterally  present previously.     There is decreased attenuation involving the insular cortex on the left  and operculum worrisome for an acute left MCA distribution infarct. The  area of decreased attenuation is somewhat ill-defined but measures  approximately 4.3 cm in AP dimension. There is no evidence of  hemorrhage. Further evaluation with MRI examination of the brain with  and without contrast is recommended. The above information was  immediately called to the patient's nurse at the time of dictation. The  patient's nurse is to immediately relay the information to the clinical  service.           Radiation dose reduction techniques were utilized, including automated  exposure control and exposure modulation based on body size.                cefTRIAXone 1 g Intravenous Q24H   insulin lispro 0-9 Units Subcutaneous Q4H       pantoprazole 8 mg/hr Last Rate: 8 mg/hr (04/01/20 0732)       Medication Review:   Current Facility-Administered Medications   Medication Dose Route Frequency Provider Last  Rate Last Dose   • acetaminophen (TYLENOL) tablet 650 mg  650 mg Oral Q4H PRN Boogie Saavedra MD        Or   • acetaminophen (TYLENOL) suppository 650 mg  650 mg Rectal Q4H PRN Boogie Saavedra MD       • cefTRIAXone (ROCEPHIN) IVPB 1 g  1 g Intravenous Q24H Boogie Saavedra  mL/hr at 03/31/20 2014 1 g at 03/31/20 2014   • dextrose (D50W) 25 g/ 50mL Intravenous Solution 25 g  25 g Intravenous Q15 Min PRN Boogie Saavedra MD   25 g at 03/30/20 0033   • dextrose (GLUTOSE) oral gel 15 g  15 g Oral Q15 Min PRN Boogie Saavedra MD       • glucagon (human recombinant) (GLUCAGEN DIAGNOSTIC) injection 1 mg  1 mg Subcutaneous Q15 Min PRN Boogie Saavedra MD       • insulin lispro (humaLOG) injection 0-9 Units  0-9 Units Subcutaneous Q4H Boogie Saavedra MD   2 Units at 04/01/20 0209   • ipratropium-albuterol (DUO-NEB) nebulizer solution 3 mL  3 mL Nebulization Q2H PRN Boogie Saavedra MD       • magic mouthwash oral supsension 15 mL  15 mL Swish & Spit Q4H PRN Mario Saavedra MD   15 mL at 03/30/20 2022   • ondansetron (ZOFRAN) tablet 4 mg  4 mg Oral Q6H PRN Boogie Saavedra MD        Or   • ondansetron (ZOFRAN) injection 4 mg  4 mg Intravenous Q6H PRN Boogie Saavedra MD       • pantoprazole (PROTONIX) 40 mg/100 mL (0.4 mg/mL) in 0.9% NS IVPB  8 mg/hr Intravenous Continuous Boogie Saavedra MD 20 mL/hr at 04/01/20 0732 8 mg/hr at 04/01/20 0732       Assessment/Plan   1. KIERAN with baseline unknown after 9/19 when renal function was normal.  I think that she is uremic. Waste products are high. Pre-renal picture with blood in GI tract.   Fortunately, K and bicarb are ok.  Volume excess by exam , but uop 1400 cc.  I talked at length with her  about her multisystem organ dysfunction including KIERAN, Liver enzyme elevation with coagulopathy, TCP, GI bleeding ( duodenal ulcer, erosive gastritis).      2. Oral mucosal bleeding without clear source.   3. Coagulopathy. .  4. TCP.   5. UGI bleeding with duodenal  ulcer, gastritis.  Hg stable.   6. Elevated liver enzymes with distended gallbladder  7. Leukocytosis  8. Positive IGG HSV.  ID eval noted.   9. Dementia with superimposed metabolic encephalopathy.    Plan:    Kidney function is worsening.  is still not sure about dialysis and wants to D/W his daughter  Will continue to evaluate  Surveillance labs      Vish Rodriguez MD  04/01/20  10:43

## 2020-04-01 NOTE — CONSULTS
Name: Darby Workman ADMIT: 3/27/2020   : 1945  PCP: Eric Matta MD    MRN: [id number] LOS: 5 days   AGE/SEX: 75 y.o. female  ROOM: [address]      Patient Care Team:  Eric Matta MD as PCP - General (General Practice)  Chief Complaint   Patient presents with   • Altered Mental Status   • Black or Bloody Stool     CC: Patient somnolent not able to give chief complaint    Subjective     Inpatient Vascular Surgery Consult  Consult performed by: Alexandre Louis MD  Consult ordered by: Vish Rodriguez MD  Reason for consult: Urgent dialysis access        75-year-old female with acute on chronic renal failure asked to see for hemodialysis access.  Patient unable to give history with altered mental status.  Patient with multiple medical problems with history of stroke.  She has moderate carotid stenosis.  Have reviewed carotid duplex scan done recently.  Also reviewed CT angiogram.  Patient treated with antiplatelet therapy but now has bleeding ulcer for reason for admission as well as bleeding orally.  Antiplatelet therapy has been held by neurology and medicine.  Patient also with history of diabetes mellitus, hypertension, hyperlipidemia.  She is a nursing home patient with dementia.  She has had previous history of left carotid surgery and stent in the past.    Altered Mental Status   This is a chronic problem. The current episode started 1 to 4 weeks ago. The problem occurs constantly. The problem has been gradually worsening.      Review of Systems   Unable to perform ROS: Dementia       Past Medical History:   Diagnosis Date   • Acute cerebrovascular accident (CVA) of cerebellum (CMS/HCC)    • Acute ischemic stroke (CMS/HCC)    • Arthritis    • Coronary artery disease    • CVA (cerebral vascular accident) (CMS/HCC)    • Diabetes mellitus (CMS/HCC)    • Heart attack (CMS/HCC)    • History of blood clots    • Hyperlipidemia    • Hypertension    • Vision loss      Past Surgical  History:   Procedure Laterality Date   • CAROTID ENDARTERECTOMY Left 7/17/2019    Procedure: Left carotid endarterectomy;  Surgeon: Rupert Oliva MD;  Location: [facility] MAIN OR;  Service: Neurosurgery   • EMBOLECTOMY Left 7/17/2019    Procedure: CEREBRAL ANGIOGRAM, LEFT INTERNAL CAROTID ARTERY STENT;  Surgeon: Rupert Oliva MD;  Location: [facility] HYBRID OR 18/19;  Service: Neurosurgery   • ENDOSCOPY N/A 3/28/2020    Procedure: ESOPHAGOGASTRODUODENOSCOPY AT BEDSIDE WITH EPI INJECTION AND GOLD PROBE CAUTERIZATION;  Surgeon: Malathi Bright MD;  Location: [facility] ENDOSCOPY;  Service: Gastroenterology;  Laterality: N/A;  PRE GI BLEED  POST EROSIVE GASTRITIS, DUODENAL ULCER WITH CLOT     Family History   Problem Relation Age of Onset   • Arthritis Mother    • Heart disease Father    • Breast cancer Neg Hx    • Malig Hyperthermia Neg Hx      Social History     Tobacco Use   • Smoking status: Never Smoker   • Smokeless tobacco: Never Used   Substance Use Topics   • Alcohol use: No     Frequency: Never   • Drug use: No     Medications Prior to Admission   Medication Sig Dispense Refill Last Dose   • acetaminophen (TYLENOL) 325 MG tablet Take 650 mg by mouth Every 6 (Six) Hours As Needed for Mild Pain .      • apixaban (ELIQUIS) 5 MG tablet tablet Take 1 tablet by mouth Every 12 (Twelve) Hours. 60 tablet  7/14/2019   • aspirin 325 MG tablet Take 1 tablet by mouth Daily.      • atorvastatin (LIPITOR) 80 MG tablet Take 1 tablet by mouth Every Night.      • brimonidine (ALPHAGAN) 0.2 % ophthalmic solution Administer 1 drop to both eyes 2 (Two) Times a Day.   7/17/2019 at Unknown time   • clopidogrel (PLAVIX) 75 MG tablet Take 1 tablet by mouth Daily. 30 tablet 0    • dorzolamide (TRUSOPT) 2 % ophthalmic solution Administer 1 drop to both eyes 2 (Two) Times a Day.   7/16/2019 at Unknown time   • ferrous sulfate 325 (65 FE) MG tablet Take 325 mg by mouth Daily With Breakfast.      • gabapentin (NEURONTIN) 100 MG capsule  Take 1 capsule by mouth 2 (Two) Times a Day. 60 capsule 1    • glipiZIDE (GLUCOTROL) 5 MG tablet Take 1 tablet by mouth 2 (Two) Times a Day Before Meals.      • LORazepam (ATIVAN) 0.5 MG tablet Take 1 tablet by mouth 2 (Two) Times a Day. 60 tablet 0    • losartan 50 MG tablet 100 mg, hydrochlorothiazide 12.5 MG capsule 12.5 mg Take 1 dose by mouth Daily.      • naproxen (NAPROSYN) 500 MG tablet Take 500 mg by mouth 2 (Two) Times a Day With Meals.      • nebivolol (BYSTOLIC) 20 MG tablet Take 1 tablet by mouth 2 (Two) Times a Day.      • nitroglycerin (NITROSTAT) 0.4 MG SL tablet Place 1 tablet under the tongue Every 5 (Five) Minutes As Needed for Chest Pain (Systolic BP Greater Than 100). Max 3 doses in 15 minutes.  12    • pantoprazole (PROTONIX) 40 MG EC tablet Take 1 tablet by mouth 2 (Two) Times a Day Before Meals. (Patient taking differently: Take 40 mg by mouth Daily. Indications: Gastroesophageal Reflux Disease)      • PARoxetine (PAXIL) 10 MG tablet Take 1 tablet by mouth Daily. 30 tablet 0    • vitamin D (ERGOCALCIFEROL) 1.25 MG (31495 UT) capsule capsule Take 50,000 Units by mouth 1 (One) Time Per Week.      • aluminum-magnesium hydroxide-simethicone (MAALOX MAX) 400-400-40 MG/5ML suspension Take 15 mL by mouth Every 6 (Six) Hours As Needed for Indigestion or Heartburn.      • amLODIPine (NORVASC) 5 MG tablet Take 1 tablet by mouth 2 (Two) Times a Day.      • dextromethorphan-quinidine (NUEDEXTA) 20-10 MG capsule capsule Take 1 capsule by mouth 2 (Two) Times a Day. 60 capsule 1    • hydrALAZINE (APRESOLINE) 50 MG tablet Take 1 tablet by mouth Every 8 (Eight) Hours.      • insulin lispro (humaLOG) 100 UNIT/ML injection Inject 0-7 Units under the skin into the appropriate area as directed 4 (Four) Times a Day With Meals & at Bedtime.  12    • losartan (COZAAR) 100 MG tablet Take 1 tablet by mouth Daily. 30 tablet 0    • melatonin 3 MG tablet Take 1 tablet by mouth Every Night.      • metFORMIN (GLUCOPHAGE)  1000 MG tablet Take 1 tablet by mouth 2 (Two) Times a Day With Meals.      • OLANZapine (zyPREXA) 2.5 MG tablet Take 24 tablets by mouth 2 (Two) Times a Day. 60 tablet 1    • potassium chloride (KLOR-CON) 20 MEQ packet Take 20 mEq by mouth Daily With Breakfast.          cefTRIAXone 1 g Intravenous Q24H   heparin (porcine) 3,000 Units Intracatheter Once   insulin lispro 0-9 Units Subcutaneous Q4H       pantoprazole 8 mg/hr Last Rate: 8 mg/hr (04/01/20 1250)     •  acetaminophen **OR** acetaminophen  •  dextrose  •  dextrose  •  glucagon (human recombinant)  •  ipratropium-albuterol  •  magic mouthwash  •  ondansetron **OR** ondansetron  Patient has no known allergies.    Objective     Physical Exam:  Physical Exam   Constitutional: She appears lethargic. She appears cachectic. She has a sickly appearance.   Eyes: Pupils are equal, round, and reactive to light.   Neck: Trachea normal. Carotid bruit is not present. No thyromegaly present.       Cardiovascular: Normal rate and regular rhythm.  Occasional extrasystoles are present.   Pulses:       Carotid pulses are 2+ on the right side, and 2+ on the left side.       Femoral pulses are 2+ on the right side, and 2+ on the left side.  Doppler pedal pulses bilaterally   Pulmonary/Chest: No respiratory distress. She has no wheezes. She has rales.   Abdominal: Soft. Bowel sounds are decreased. There is no hepatosplenomegaly. There is no tenderness.   Neurological: She appears lethargic.   Not obtainable due to patient unresponsive with dementia   Psychiatric:   Not obtainable with patient dementia   Vitals reviewed.      Vital Signs and Labs:  Vital Signs Patient Vitals for the past 24 hrs:   BP Temp Temp src Pulse Resp SpO2 Weight   04/01/20 1401 165/76 -- -- -- -- -- --   04/01/20 1253 (!) 186/75 -- -- -- -- -- --   04/01/20 1206 180/81 98.7 °F (37.1 °C) Oral 68 16 95 % --   04/01/20 0700 175/77 98.6 °F (37 °C) Oral 73 16 98 % --   04/01/20 0636 -- -- -- -- -- -- 65.6 kg  (144 lb 11.2 oz)   04/01/20 0344 174/74 98.7 °F (37.1 °C) Oral 71 16 91 % --   03/31/20 2329 167/72 98.8 °F (37.1 °C) Axillary 71 16 97 % --   03/31/20 1930 155/76 98.6 °F (37 °C) Axillary 71 16 96 % --   03/31/20 1845 137/71 -- -- 64 16 92 % --       CBC    Results from last 7 days   Lab Units 04/01/20  0520 03/31/20  0511 03/30/20  1428 03/30/20  0632 03/30/20  0034 03/29/20  1803 03/29/20  1236 03/29/20  0601  03/28/20  0430  03/27/20  1033   WBC 10*3/mm3 15.83* 20.66*  --  16.28*  --   --   --   --   --  11.76*  --  12.50*   HEMOGLOBIN g/dL 8.8* 10.2*  --  9.8* 9.6* 10.0* 10.0* 10.5*   < > 10.5*   < > 10.4*   PLATELETS 10*3/mm3 83* 87* 45* 50*  --   --   --   --   --  98*  --  159    < > = values in this interval not displayed.     BMP   Results from last 7 days   Lab Units 04/01/20  0520 03/31/20  0511 03/30/20  0632 03/29/20  0921 03/28/20  1254 03/28/20  0430 03/27/20  1537   SODIUM mmol/L 142 138 135* 137 135* 132* 133*   POTASSIUM mmol/L 3.8 4.1 4.4 4.1 4.6 4.8 5.3*   CHLORIDE mmol/L 97* 93* 89* 89* 93* 93* 98   CO2 mmol/L 25.6 27.1 30.6* 32.9* 26.3 24.1 19.3*   BUN mg/dL 134* 119* 112* 111* 103* 101* 91*   CREATININE mg/dL 6.02* 5.87* 5.75* 5.73* 5.26* 5.03* 5.04*   GLUCOSE mg/dL 168* 121* 126* 107* 206* 193* 173*     Coag   Results from last 7 days   Lab Units 04/01/20  0520 03/31/20  1440 03/30/20  1536   INR  1.79* 1.73* 1.62*   APTT seconds  --   --  46.6*     Infection   Results from last 7 days   Lab Units 03/30/20  1614 03/30/20  1439 03/30/20  0632 03/27/20  1538 03/27/20  1537   BLOODCX  No growth at 2 days No growth at 2 days  --   --   --    URINECX   --   --   --  No growth  --    PROCALCITONIN ng/mL  --   --  1.58*  --  2.07*     Radiology(recent) No radiology results for the last day    Active Hospital Problems    Diagnosis  POA   • Upper GI bleed [K92.2]  Yes      Resolved Hospital Problems   No resolved problems to display.       Problem Points:  4:  Patient has a new problem, with  additional work-up planned  Total problem points:4 or more    Data Points:  1:  I have reviewed or order clinical lab test  1:  I have reviewed or order radiology test (except heart catheterization or echo)  2:  I have reviewed and summation of old records and/or discussed the patients care with another health care provider  Total data points:4 or more    Risk Points:  High:  Any illness that poses threat to life or body funciton    MDM requires 2/3 (Problem points, Data points and Risk)  MDM Prob point Data point Risk   SF 1 1 Minimal   Low 2 2 Low   Mod 3 3 Moderate   High 4 4 High     Code requires 3/3 (MDM, History and Exam)  Code MDM History Exam Time   18552 SF/Low Detailed Detailed 30   11370 Mod Comprehensive Comprehensive 50   76598 High Comprehensive Comprehensive 70     Detailed history:  4 elements HPI or status of 3 chronic problems; 2-9 system ROS  Comprehensive:  4 elements HPI or status of 3 chronic problems;  10 system ROS    Detailed Exam:  12 findings from any organ system  Comprehensive Exam:  2 findings from each of 9 systems.   61824    Assessment/Plan       Upper GI bleed      75 y.o. female with multiple medical problems admitted with gastrointestinal bleeding and coagulopathy.  She has acute on chronic renal failure.  Nephrology has evaluated.  Sepsis is being treated.  Patient needs urgent hemodialysis and will place dialysis catheter.  She has multilumen catheter right neck and will place on left jugular vein side.  Discussed risk with patient son by phone and consent obtained by nurse and signed.    I discussed the patients findings and my recommendations with patient, family and nursing staff.    Alexandre Louis MD  04/01/20  16:29    Please call my office with any question: [phone]

## 2020-04-01 NOTE — PROGRESS NOTES
Patient Identification:  NAME:  Darby Workman  Age:  75 y.o.   Sex:  female   :  1945   MRN:  [id number]       Chief complaint: Acute metabolic encephalopathy mutism, stroke  History of present illness: She is still mute and my independent eyeball review the CAT scan shows left hemispheric changes consistent with possible subacute stroke.  She is still with decreased right nasolabial fold, see below, but we are having to hold both the Eliquis and aspirin as she has an active GI bleed that has been life-threatening.      Past medical history:  Past Medical History:   Diagnosis Date   • Acute cerebrovascular accident (CVA) of cerebellum (CMS/HCC)    • Acute ischemic stroke (CMS/HCC)    • Arthritis    • Coronary artery disease    • CVA (cerebral vascular accident) (CMS/HCC)    • Diabetes mellitus (CMS/HCC)    • Heart attack (CMS/HCC)    • History of blood clots    • Hyperlipidemia    • Hypertension    • Vision loss        Allergies:  Patient has no known allergies.    Home medications:  Medications Prior to Admission   Medication Sig Dispense Refill Last Dose   • acetaminophen (TYLENOL) 325 MG tablet Take 650 mg by mouth Every 6 (Six) Hours As Needed for Mild Pain .      • apixaban (ELIQUIS) 5 MG tablet tablet Take 1 tablet by mouth Every 12 (Twelve) Hours. 60 tablet  2019   • aspirin 325 MG tablet Take 1 tablet by mouth Daily.      • atorvastatin (LIPITOR) 80 MG tablet Take 1 tablet by mouth Every Night.      • brimonidine (ALPHAGAN) 0.2 % ophthalmic solution Administer 1 drop to both eyes 2 (Two) Times a Day.   2019 at Unknown time   • clopidogrel (PLAVIX) 75 MG tablet Take 1 tablet by mouth Daily. 30 tablet 0    • dorzolamide (TRUSOPT) 2 % ophthalmic solution Administer 1 drop to both eyes 2 (Two) Times a Day.   2019 at Unknown time   • ferrous sulfate 325 (65 FE) MG tablet Take 325 mg by mouth Daily With Breakfast.      • gabapentin (NEURONTIN) 100 MG capsule Take 1 capsule by mouth 2  (Two) Times a Day. 60 capsule 1    • glipiZIDE (GLUCOTROL) 5 MG tablet Take 1 tablet by mouth 2 (Two) Times a Day Before Meals.      • LORazepam (ATIVAN) 0.5 MG tablet Take 1 tablet by mouth 2 (Two) Times a Day. 60 tablet 0    • losartan 50 MG tablet 100 mg, hydrochlorothiazide 12.5 MG capsule 12.5 mg Take 1 dose by mouth Daily.      • naproxen (NAPROSYN) 500 MG tablet Take 500 mg by mouth 2 (Two) Times a Day With Meals.      • nebivolol (BYSTOLIC) 20 MG tablet Take 1 tablet by mouth 2 (Two) Times a Day.      • nitroglycerin (NITROSTAT) 0.4 MG SL tablet Place 1 tablet under the tongue Every 5 (Five) Minutes As Needed for Chest Pain (Systolic BP Greater Than 100). Max 3 doses in 15 minutes.  12    • pantoprazole (PROTONIX) 40 MG EC tablet Take 1 tablet by mouth 2 (Two) Times a Day Before Meals. (Patient taking differently: Take 40 mg by mouth Daily. Indications: Gastroesophageal Reflux Disease)      • PARoxetine (PAXIL) 10 MG tablet Take 1 tablet by mouth Daily. 30 tablet 0    • vitamin D (ERGOCALCIFEROL) 1.25 MG (37553 UT) capsule capsule Take 50,000 Units by mouth 1 (One) Time Per Week.      • aluminum-magnesium hydroxide-simethicone (MAALOX MAX) 400-400-40 MG/5ML suspension Take 15 mL by mouth Every 6 (Six) Hours As Needed for Indigestion or Heartburn.      • amLODIPine (NORVASC) 5 MG tablet Take 1 tablet by mouth 2 (Two) Times a Day.      • dextromethorphan-quinidine (NUEDEXTA) 20-10 MG capsule capsule Take 1 capsule by mouth 2 (Two) Times a Day. 60 capsule 1    • hydrALAZINE (APRESOLINE) 50 MG tablet Take 1 tablet by mouth Every 8 (Eight) Hours.      • insulin lispro (humaLOG) 100 UNIT/ML injection Inject 0-7 Units under the skin into the appropriate area as directed 4 (Four) Times a Day With Meals & at Bedtime.  12    • losartan (COZAAR) 100 MG tablet Take 1 tablet by mouth Daily. 30 tablet 0    • melatonin 3 MG tablet Take 1 tablet by mouth Every Night.      • metFORMIN (GLUCOPHAGE) 1000 MG tablet Take 1  tablet by mouth 2 (Two) Times a Day With Meals.      • OLANZapine (zyPREXA) 2.5 MG tablet Take 24 tablets by mouth 2 (Two) Times a Day. 60 tablet 1    • potassium chloride (KLOR-CON) 20 MEQ packet Take 20 mEq by mouth Daily With Breakfast.           Hospital medications:    cefTRIAXone 1 g Intravenous Q24H   insulin lispro 0-9 Units Subcutaneous Q4H       pantoprazole 8 mg/hr Last Rate: 8 mg/hr (04/01/20 0732)     •  acetaminophen **OR** acetaminophen  •  dextrose  •  dextrose  •  glucagon (human recombinant)  •  ipratropium-albuterol  •  magic mouthwash  •  ondansetron **OR** ondansetron      Objective:  Vitals Ranges:   Temp:  [98.1 °F (36.7 °C)-98.8 °F (37.1 °C)] 98.7 °F (37.1 °C)  Heart Rate:  [64-73] 68  Resp:  [16-18] 16  BP: (137-181)/(71-91) 180/81      Physical Exam:  Awake alert does not blink to threat will not say anything today tone is spastic in both upper extremities right may be a little more than the left reflexes trace throughout toes downgoing she is mute    Results review:   I reviewed the patient's new clinical results.    Data review:  Lab Results (last 24 hours)     Procedure Component Value Units Date/Time    POC Glucose Once [[id number]]  (Abnormal) Collected:  04/01/20 1145    Specimen:  Blood Updated:  04/01/20 1147     Glucose 184 mg/dL     POC Glucose Once [[id number]]  (Abnormal) Collected:  04/01/20 0803    Specimen:  Blood Updated:  04/01/20 0815     Glucose 156 mg/dL     Comprehensive Metabolic Panel [[id number]]  (Abnormal) Collected:  04/01/20 0520    Specimen:  Blood Updated:  04/01/20 0600     Glucose 168 mg/dL       mg/dL      Creatinine 6.02 mg/dL      Sodium 142 mmol/L      Potassium 3.8 mmol/L      Chloride 97 mmol/L      CO2 25.6 mmol/L      Calcium 6.6 mg/dL      Total Protein 4.5 g/dL      Albumin 2.30 g/dL      ALT (SGPT) 59 U/L      AST (SGOT) 86 U/L      Alkaline Phosphatase 438 U/L      Total Bilirubin 4.6 mg/dL      eGFR Non African Amer 7 mL/min/1.73       Comment: <15 Indicative of kidney failure.        eGFR   Amer --     Comment: <15 Indicative of kidney failure.        Globulin 2.2 gm/dL      A/G Ratio 1.0 g/dL      BUN/Creatinine Ratio 22.3     Anion Gap 19.4 mmol/L     Narrative:       GFR Normal >60  Chronic Kidney Disease <60  Kidney Failure <15      Lipase [[id number]]  (Abnormal) Collected:  04/01/20 0520    Specimen:  Blood Updated:  04/01/20 0554     Lipase 193 U/L     CBC & Differential [[id number]] Collected:  04/01/20 0520    Specimen:  Blood Updated:  04/01/20 0553    Narrative:       The following orders were created for panel order CBC & Differential.  Procedure                               Abnormality         Status                     ---------                               -----------         ------                     CBC Auto Differential[[id number]]        Abnormal            Final result                 Please view results for these tests on the individual orders.    CBC Auto Differential [[id number]]  (Abnormal) Collected:  04/01/20 0520    Specimen:  Blood Updated:  04/01/20 0553     WBC 15.83 10*3/mm3      RBC 3.06 10*6/mm3      Hemoglobin 8.8 g/dL      Hematocrit 25.8 %      MCV 84.3 fL      MCH 28.8 pg      MCHC 34.1 g/dL      RDW 16.5 %      RDW-SD 51.3 fl      MPV 10.8 fL      Platelets 83 10*3/mm3      Neutrophil % 90.1 %      Lymphocyte % 5.8 %      Monocyte % 3.1 %      Eosinophil % 0.1 %      Basophil % 0.1 %      Immature Grans % 0.8 %      Neutrophils, Absolute 14.26 10*3/mm3      Lymphocytes, Absolute 0.92 10*3/mm3      Monocytes, Absolute 0.49 10*3/mm3      Eosinophils, Absolute 0.01 10*3/mm3      Basophils, Absolute 0.02 10*3/mm3      Immature Grans, Absolute 0.13 10*3/mm3      nRBC 0.0 /100 WBC     Protime-INR [[id number]]  (Abnormal) Collected:  04/01/20 0520    Specimen:  Blood Updated:  04/01/20 0547     Protime 20.5 Seconds      INR 1.79    POC Glucose Once [[id number]]  (Abnormal) Collected:  04/01/20 0426    Specimen:   Blood Updated:  04/01/20 0428     Glucose 180 mg/dL     POC Glucose Once [[id number]]  (Abnormal) Collected:  04/01/20 0025    Specimen:  Blood Updated:  04/01/20 0027     Glucose 198 mg/dL     POC Glucose Once [[id number]]  (Abnormal) Collected:  03/31/20 2054    Specimen:  Blood Updated:  03/31/20 2056     Glucose 154 mg/dL     POC Glucose Once [[id number]]  (Abnormal) Collected:  03/31/20 1650    Specimen:  Blood Updated:  03/31/20 1651     Glucose 146 mg/dL     Blood Culture - Blood, Arm, Left [[id number]] Collected:  03/30/20 1614    Specimen:  Blood from Arm, Left Updated:  03/31/20 1630     Blood Culture No growth at 24 hours    Protime-INR [[id number]]  (Abnormal) Collected:  03/31/20 1440    Specimen:  Blood Updated:  03/31/20 1520     Protime 19.9 Seconds      INR 1.73    Blood Culture - Blood, Arm, Left [[id number]] Collected:  03/30/20 1439    Specimen:  Blood from Arm, Left Updated:  03/31/20 1445     Blood Culture No growth at 24 hours    LIBBY,PE and FLC, Serum [[id number]]  (Abnormal) Collected:  03/30/20 1432    Specimen:  Blood Updated:  03/31/20 1408     IgG 690 mg/dL      Comment:               **Please note reference interval change**        IgA 277 mg/dL      IgM 86 mg/dL      Total Protein 4.1 g/dL      Albumin 1.9 g/dL      Alpha-1-Globulin 0.2 g/dL      Alpha-2-Globulin 0.4 g/dL      Beta Globulin 0.9 g/dL      Gamma Globulin 0.7 g/dL      M-Vivek Not Observed g/dL      Globulin 2.2 g/dL      A/G Ratio 0.9     Immunofixation Reflex, Serum Comment:     Comment: Presence of monoclonal protein is unclear at this time. Suggest  repeat in 3 to 6 months if clinically indicated.        Please note Comment     Comment: Protein electrophoresis scan will follow via computer, mail, or   delivery.        Free Light Chain, Kappa 119.3 mg/L      Free Lambda Light Chains 67.8 mg/L      Kappa/Lambda Ratio 1.76    Narrative:       Performed at:  [address] [place] [address]  [id number]  [facility]  Director: Sal Austin PhD, Phone:  [phone]    POC Glucose Once [[id number]]  (Abnormal) Collected:  03/31/20 1359    Specimen:  Blood Updated:  03/31/20 1401     Glucose 147 mg/dL     Ammonia [[id number]]  (Normal) Collected:  03/31/20 1249    Specimen:  Blood Updated:  03/31/20 1321     Ammonia 31 umol/L     EBVCA(IgG / M) [[id number]]  (Abnormal) Collected:  03/30/20 1756    Specimen:  Blood Updated:  03/31/20 1307     EBV VCA IgM <36.0 U/mL      Comment:                                  Negative        <36.0                                   Equivocal 36.0 - 43.9                                   Positive        >43.9        EBV VCA IgG >600.0 U/mL      Comment:                                  Negative        <18.0                                   Equivocal 18.0 - 21.9                                   Positive        >21.9       Narrative:       Performed at:  [address] [place] [address]  [id number]  : Sal Austin PhD, Phone:  [phone]           Imaging:  Imaging Results (Last 24 Hours)     Procedure Component Value Units Date/Time    CT Head Without Contrast [[id number]] Collected:  03/31/20 1419     Updated:  03/31/20 1419    Narrative:       CT HEAD WITHOUT CONTRAST     HISTORY:  Altered mental status.     COMPARISON: CT head 08/11/2019.     FINDINGS: A remote infarct is appreciated involving the left parietal  lobe posteriorly and the temporal occipital region inferior laterally  present previously.     There is decreased attenuation involving the insular cortex on the left  and operculum worrisome for an acute left MCA distribution infarct. The  area of decreased attenuation is somewhat ill-defined but measures  approximately 4.3 cm in AP dimension. There is no evidence of  hemorrhage. Further evaluation with MRI examination of the brain with  and without contrast is recommended. The above information was  immediately called to the patient's nurse at the time  of dictation. The  patient's nurse is to immediately relay the information to the clinical  service.           Radiation dose reduction techniques were utilized, including automated  exposure control and exposure modulation based on body size.                   Assessment and Plan:   What a conundrum!!  Acute GI bleed and patient with severe metabolic encephalopathy dementia and is currently mute.  Although this could easily be part of a significant metabolic encephalopathy the CAT scan actually shows something in the left hemisphere that COULD indicate subacute stroke, which would correlate with an aphasia.  Nonetheless, our hands are tied with respect to any anticoagulation or antiplatelet agents  (Note she was on Eliquis and aspirin together at home)  So, even though she MAY have suffered an acute stroke we cannot give any anticoagulation or antiplatelet agents she is moderately alert and still has what appears to be decreased right nasolabial fold and possibly spasticity in the right side compared to the left indicating old stroke only.  Note that I ordered carotid Dopplers that show both carotid arteries are open and there is a patent stent in the left carotid artery.  In short, I do not think she suffered any acute stroke at this time and that the CT findings are old  Ajit Bennett MD  04/01/20  12:39

## 2020-04-01 NOTE — PLAN OF CARE
Patient remain  in  restraint  for  pulling  on  lines.  No  s/s  of  any  injury  noted.    No  s/s  of  pain.    Continues  to  Have  bloody    drainage  from  mouth,  oral  care  done. Polk  care  done.      Labs  drawn  and  awaiting  result.    Remain  Sinus  rhythm  this  shift.      Lethargic  but easily  aroused.   Nursing will  continue to monitor.  Problem: Restraint, Nonbehavioral (Nonviolent)  Goal: Rationale and Justification  Outcome: Ongoing (interventions implemented as appropriate)  Flowsheets (Taken 3/30/2020 0638)  Rationale and Justification: failure of less restrictive safety measures;prevent harm to self  Goal: Nonbehavioral (Nonviolent) Restraint: Absence of Injury/Harm  Outcome: Ongoing (interventions implemented as appropriate)  Flowsheets (Taken 3/30/2020 0638)  Nonbehavioral (Nonviolent) Restraint: Absence of Injury/Harm: met  Goal: Nonbehavioral (Nonviolent) Restraint: Achievement of Discontinuation Criteria  Outcome: Ongoing (interventions implemented as appropriate)  Flowsheets (Taken 4/1/2020 0632)  Nonbehavioral (Nonviolent) Restraint: Achievement of Discontinuation Criteria: not met  Goal: Nonbehavioral (Nonviolent) Restraint: Preservation of Dignity and Wellbeing  Outcome: Ongoing (interventions implemented as appropriate)  Flowsheets (Taken 3/30/2020 0638)  Nonbehavioral (Nonviolent) Restraint: Preservation of Dignity and Wellbeing: met     Problem: Gastrointestinal Bleeding (Adult)  Goal: Signs and Symptoms of Listed Potential Problems Will be Absent, Minimized or Managed (Gastrointestinal Bleeding)  Outcome: Ongoing (interventions implemented as appropriate)  Flowsheets  Taken 4/1/2020 0632  Problems Assessed (GI Bleeding): all  Taken 3/31/2020 0640  Problems Present (GI Bleeding): hemorrhage     Problem: Skin Injury Risk (Adult)  Goal: Skin Health and Integrity  Outcome: Ongoing (interventions implemented as appropriate)  Flowsheets (Taken 4/1/2020 0632)  Skin Health and  Integrity: making progress toward outcome     Problem: Patient Care Overview  Goal: Plan of Care Review  Outcome: Ongoing (interventions implemented as appropriate)  Flowsheets (Taken 4/1/2020 0632)  Progress: no change  Plan of Care Reviewed With: patient  Goal: Interprofessional Rounds/Family Conf  Outcome: Ongoing (interventions implemented as appropriate)  Flowsheets (Taken 3/30/2020 0638)  Participants: nursing;patient

## 2020-04-01 NOTE — PROGRESS NOTES
LOS: 5 days     Chief Complaint:  Follow-up leukocytosis    Interval History:  No fever. CT head yesterday w/ possible MCA stroke. WBC down to 15k.     ROS: cannot obtain due to mental status    Vital Signs  Temp:  [97.9 °F (36.6 °C)-98.8 °F (37.1 °C)] 98.6 °F (37 °C)  Heart Rate:  [64-73] 73  Resp:  [16-18] 16  BP: (137-181)/(71-91) 175/77    Physical Exam:  General: lethargic, acute and chronically ill-appearing   Eyes: closes eyes tightly during exam  ENT: MM with excessive amounts of dried blood on tongue, palate, and buccal mucosa; no vesicles  Neck: RIJ central line w/o erythema  Cardiovascular: NR  Respiratory: normal work of breathing on ambient air  GI: Abdomen is mildly distended and appears to be moderately TTP  : + Polk catheter present  Neurological: Alert and oriented x 0, cannot assess strength or sensation  Psychiatric: lethargic    Antibiotics:  •  cefTRIAXone (ROCEPHIN) IVPB 1 g, 1 g, Intravenous, Q24H    LABS:  CBC, CMP, micro reviewed today  Lab Results   Component Value Date    WBC 15.83 (H) 04/01/2020    HGB 8.8 (L) 04/01/2020    HCT 25.8 (L) 04/01/2020    MCV 84.3 04/01/2020    PLT 83 (L) 04/01/2020     Lab Results   Component Value Date    GLUCOSE 168 (H) 04/01/2020    CALCIUM 6.6 (L) 04/01/2020     04/01/2020    K 3.8 04/01/2020    CO2 25.6 04/01/2020    CL 97 (L) 04/01/2020     (H) 04/01/2020    CREATININE 6.02 (H) 04/01/2020    EGFRIFAFRI  04/01/2020      Comment:      <15 Indicative of kidney failure.    EGFRIFNONA 7 (L) 04/01/2020    BCR 22.3 04/01/2020    ANIONGAP 19.4 (H) 04/01/2020     Lab Results   Component Value Date    ALT 59 (H) 04/01/2020     Lipase 207-->193  UA 6-12 WBCs, small LE, positive nitrite  Procal 1.58  IgG 653  Viral hepatitis panel negative  CMV IgM negative; IgG negative  EBV IgM negative; IgG positive  HSV-1 IgG positive  HSV-2 IgG negative     Microbiology:  3/27 UCx: negative  3/30 BCx: NGTD     Radiology (personally reviewed report):   CT head  worrisome for acute L MCA infarction    Assessment/Plan   1. Upper GI bleed  2. Dementia  3. Elevated LFTs - better  4. Acute kidney injury - worse  5. Uremia  6. Hyponatremia - resolved  7. Hyperkalemia - resolved  8. Leukocytosis - better  9. Thrombocytopenia  10. Possible L MCA infarction     Mental status no better today on my exam. CT head worrisome for L MCA stroke. Neurology is following. Creatinine and BUN both higher today.     She is afebrile with an improving leukocytosis that was probably reactive in nature due to her GI bleed. Cultures are NGTD. No evidence of infection at this time. I'll DC her ceftriaxone.    Thank you for allowing me to be involved in the care of this patient. Infectious diseases will sign off at this time  but please call me at [phone] if any further questions.

## 2020-04-01 NOTE — PROGRESS NOTES
"[facility] PULMONARY CARE         Dr Rodriguez Sayied   LOS: 5 days   Patient Care Team:  Eric Matta MD as PCP - General (General Practice)    Chief Complaint: Status post GI bleed hemorrhagic shock oral bleeding worsening renal failure multiple medical issues as listed below.    Interval History: Events noted chart reviewed.  Patient confused with significant oozing from her oral cavity.  Unable to get any meaningful history from the patient    REVIEW OF SYSTEMS:   Confused    Ventilator/Non-Invasive Ventilation Settings (From admission, onward)    None            Vital Signs  Temp:  [98.1 °F (36.7 °C)-98.8 °F (37.1 °C)] 98.7 °F (37.1 °C)  Heart Rate:  [64-73] 68  Resp:  [16-18] 16  BP: (137-186)/(71-91) 186/75    Intake/Output Summary (Last 24 hours) at 4/1/2020 1353  Last data filed at 4/1/2020 1209  Gross per 24 hour   Intake 0 ml   Output 1450 ml   Net -1450 ml     Flowsheet Rows      First Filed Value   Admission Height  165.1 cm (65\") Documented at 03/27/2020 1029   Admission Weight  60.3 kg (133 lb) Documented at 03/27/2020 1029          Physical Exam:   General Appearance:   Confused with extensive oral bleeding noted.  Eyes scleral icterus   Lungs:    Diminished breath sounds rhonchi bilaterally on the basis    Heart:    Regular rhythm and normal rate, normal S1 and S2, no            murmur, no gallop, no rub, no click   Chest Wall:    No abnormalities observed   Abdomen:    Soft mild diffuse tenderness.  No rebound or guarding   Extremities:   Moves all extremities well, no edema, no cyanosis, no             redness  CNS confused     Results Review:        Results from last 7 days   Lab Units 04/01/20  0520 03/31/20  0511 03/30/20  0632   SODIUM mmol/L 142 138 135*   POTASSIUM mmol/L 3.8 4.1 4.4   CHLORIDE mmol/L 97* 93* 89*   CO2 mmol/L 25.6 27.1 30.6*   BUN mg/dL 134* 119* 112*   CREATININE mg/dL 6.02* 5.87* 5.75*   GLUCOSE mg/dL 168* 121* 126*   CALCIUM mg/dL 6.6* 6.8* 6.7*         Results from " "last 7 days   Lab Units 04/01/20  0520 03/31/20  0511 03/30/20  1428 03/30/20  0632   WBC 10*3/mm3 15.83* 20.66*  --  16.28*   HEMOGLOBIN g/dL 8.8* 10.2*  --  9.8*   HEMATOCRIT % 25.8* 30.1*  --  27.9*   PLATELETS 10*3/mm3 83* 87* 45* 50*     Results from last 7 days   Lab Units 04/01/20  0520 03/31/20  1440 03/30/20  1536   INR  1.79* 1.73* 1.62*   APTT seconds  --   --  46.6*                       I reviewed the patient's new clinical results.  I personally viewed and interpreted the patient's CXR        Medication Review:     cefTRIAXone 1 g Intravenous Q24H   insulin lispro 0-9 Units Subcutaneous Q4H         pantoprazole 8 mg/hr Last Rate: 8 mg/hr (04/01/20 1250)       ASSESSMENT:   1. Hemorrhagic shock -- better  2. Coagulopathy due to eliquis -- s/p kcentra reversal.  3. Acute blood loss anemia due to GI bleed-- protonix gtt, transfusion as needed, monitor hemoglobin EGD with DU and gastritis  4. Acute kidney injury -- iv fluids and monitor renal function, discussed with Dr. Atkins   5. Hyperkalemia --  \"  \"  6. Hyponatremia -- monitor  7. Elevated liver enzymes -- ct abd/pelvis ok, ultrasound done, gi following  8. Acute pancreatitis mild  9. Sepsis -- can't r/o --continue with rocephin,   10. Dementia not at baseline -- she is on neurotnin, ativan, and zyprexa at home, will have to hold these for now  11. DMII with hyperglycemia -- ssi --accuchecks ok  12. Hyperlipidemia -- hold statin  13. Thrombocytopenia -worsening  14. Small hiatal hernia  15. Erosive gastritis  16. 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  Encephalopathy    PLAN:  Events noted with multiple issues ongoing.  Mental status remains poor and noted input from neurology regarding possibility of subacute CVA.  Unable to anticoagulate due to current bleeding condition  Plans per nephrology noted with worsening renal function.  I discussed with patient's  and explained him the situation and is agreeable to proceed with dialysis.  I informed " nephrology about his plans and they will arrange for dialysis  Remains on PPI drip with falling hemoglobin.  Not sure drop in hemoglobin due to GI source versus oral component  ID input noted.  They have stopped antibiotics and continues to observe  Hematology input noted with improving platelet counts.  I will ask hospitalist to see and take over care since there is no acute pulmonary or critical care issues at this time.  I had a detailed discussion with the patient's  who at this time wishes to pursue full code.  I explained to him poor prognosis etc.  He wishes to continue with current care for now.  Due to poor mental status she is not able to eat.  May have to consider alternative means of nutrition if mental status does not improve post dialysis.    Jennifer Bryan MD  04/01/20  13:53

## 2020-04-01 NOTE — PROGRESS NOTES
Continued Stay Note  [facility]     Patient Name: Darby Workman  MRN: [id number]  Today's Date: 4/1/2020    Admit Date: 3/27/2020    Discharge Plan     Row Name 04/01/20 0811       Plan    Plan  Plan return [facility].   PAT Cortes RN    Patient/Family in Agreement with Plan  yes    Plan Comments  [place]  ( [phone]) following for [facility]. Packet in CCP office.  Plan return to [facility].   PAT Cortes RN        Discharge Codes    No documentation.             Dorothy Cortes, RN

## 2020-04-01 NOTE — PROGRESS NOTES
Subjective   76 Y/O WF with past medical history of hypertension, diabetes mellitus type 2, history of CVA and carotid artery disease who was sent from her nursing home due to GI bleed.  Patient does have history of dementia. Apparently patient had a huge bloody bowel movement in the emergency department as well.  She was on Plavix and Eliquis.           History of Present Illness   She has been in the hospital for 3 days and came from a nursing facility with GI bleeding. She had an endoscopy that documented multiple erosions in the stomach and probably a duodenal ulcer.  She was taking Plavix and anticoagulant at the nursing facility given the previous history of carotid arterial disease.  In any event, the patient was found to have also a high creatinine.  She is now jaundiced and her liver enzymes are tremendously abnormal.  Today on her physical examination like will be related below, her abdomen on the right side especially the right upper and lower quadrants are extremely tender.  While in the room the patient was mildly bleeding from her oral mucosals and she is acutely ill.  On 03/30/2020 the patient had a repeat CT scan of the abdomen that failed to document any alteration that will trigger the abdominal sinology documented on the physical exam. The patient was seen by Oscar Toribio MD, who could not find any reason for this patient to have the fever. On his examination her abdomen was not abnormally tender.       On 04/01/2020 the patient remains stuporous, unresponsive and continues having mild mucosal bleeding.  She remains jaundiced.  The patient not responsive.          Past Medical History, Past Surgical History, Social History, Family History have been reviewed and are without significant changes except as mentioned.    Review of Systems   Unable to perform ROS: Dementia    MOUTH BLEEDING,  MINIMAL RESPONSE    Medications:  The current medication list was reviewed in the EMR    ALLERGIES:  No  Known Allergies    Objective      Vitals:    03/31/20 1930 03/31/20 2329 04/01/20 0344 04/01/20 0636   BP: 155/76 167/72 174/74    BP Location:       Patient Position:       Pulse: 71 71 71    Resp: 16 16 16    Temp: 98.6 °F (37 °C) 98.8 °F (37.1 °C) 98.7 °F (37.1 °C)    TempSrc: Axillary Axillary Oral    SpO2: 96% 97% 91%    Weight:    65.6 kg (144 lb 11.2 oz)   Height:         No flowsheet data found.    Physical Exam   HENT:   extensive oral bleeding   Eyes: Scleral icterus is present.   Neck: No JVD present. No tracheal deviation present. No thyromegaly present.   Cardiovascular: Normal rate.   Pulmonary/Chest: Effort normal.   Abdominal: There is tenderness.   Musculoskeletal: Normal range of motion.   Lymphadenopathy:     She has no cervical adenopathy.   Skin: Skin is warm and dry. No rash noted. No erythema.       RECENT LABS:  Hematology WBC   Date Value Ref Range Status   04/01/2020 15.83 (H) 3.40 - 10.80 10*3/mm3 Final     RBC   Date Value Ref Range Status   04/01/2020 3.06 (L) 3.77 - 5.28 10*6/mm3 Final     Hemoglobin   Date Value Ref Range Status   04/01/2020 8.8 (L) 12.0 - 15.9 g/dL Final     Hematocrit   Date Value Ref Range Status   04/01/2020 25.8 (L) 34.0 - 46.6 % Final     Platelets   Date Value Ref Range Status   04/01/2020 83 (L) 140 - 450 10*3/mm3 Final              Metabolic Panel   Order: [id number]   Status:  Final result   Visible to patient:  No (Not Released)   Next appt:  None   Specimen Information: Blood        Component  Ref Range & Units 05:20   Glucose  65 - 99 mg/dL 168High     BUN  8 - 23 mg/dL 134High     Creatinine  0.57 - 1.00 mg/dL 6.02High     Sodium  136 - 145 mmol/L 142    Potassium  3.5 - 5.2 mmol/L 3.8    Chloride  98 - 107 mmol/L 97Low     CO2  22.0 - 29.0 mmol/L 25.6    Calcium  8.6 - 10.5 mg/dL 6.6Low     Total Protein  6.0 - 8.5 g/dL 4.5Low     Albumin  3.50 - 5.20 g/dL 2.30Low     ALT (SGPT)  1 - 33 U/L 59High     AST (SGOT)  1 - 32 U/L 86High     Alkaline  Phosphatase  39 - 117 U/L 438High     Total Bilirubin  0.2 - 1.2 mg/dL 4.6High     eGFR Non African Amer  >60 mL/min/1.73 7Low     Comment: <15 Indicative of kidney failure.   eGFR   Amer    Comment: <15 Indicative of kidney failure.   Globulin  gm/dL 2.2    A/G Ratio  g/dL 1.0    BUN/Creatinine Ratio  7.0 - 25.0 22.3    Anion Gap  5.0 - 15.0 mmol/L 19.4High     Resulting Agency [facility]      Narrative   Performed by: [facility]   GFR Normal >60  Chronic Kidney Disease <60  Kidney Failure <15      Specimen Collected: 04/01/20 05:20   Last Resulted: 04/01/20 06:00           CT HEAD WITHOUT CONTRAST     HISTORY:  Altered mental status.     COMPARISON: CT head 08/11/2019.     FINDINGS: A remote infarct is appreciated involving the left parietal  lobe posteriorly and the temporal occipital region inferior laterally  present previously.     There is decreased attenuation involving the insular cortex on the left  and operculum worrisome for an acute left MCA distribution infarct. The  area of decreased attenuation is somewhat ill-defined but measures  approximately 4.3 cm in AP dimension. There is no evidence of  hemorrhage. Further evaluation with MRI examination of the brain with  and without contrast is recommended. The above information was  immediately called to the patient's nurse at the time of dictation. The  patient's nurse is to immediately relay the information to the clinical  service.           Radiation dose reduction techniques were utilized, including automated  exposure control and exposure modulation based on body size.        Interpretation Summary     · Left carotid stent present with patent flow noted.  · Proximal right internal carotid artery moderate stenosis.            LIBBY,PE and FLC, Serum   Order: [id number]   Status:  Final result   Visible to patient:  No (Not Released)   Next appt:  None   Specimen Information: Blood        Component  Ref Range & Units 2d ago   IgG  586 - 1602 mg/dL  690    Comment:               **Please note reference interval change**   IgA  64 - 422 mg/dL 277    IgM  26 - 217 mg/dL 86    Total Protein  6.0 - 8.5 g/dL 4.1Low     Albumin  2.9 - 4.4 g/dL 1.9Low     Alpha-1-Globulin  0.0 - 0.4 g/dL 0.2    Alpha-2-Globulin  0.4 - 1.0 g/dL 0.4    Beta Globulin  0.7 - 1.3 g/dL 0.9    Gamma Globulin  0.4 - 1.8 g/dL 0.7    M-Vivek  Not Observed g/dL Not Observed    Globulin  2.2 - 3.9 g/dL 2.2    A/G Ratio  0.7 - 1.7 0.9    Immunofixation Reflex, Serum Comment:    Comment: Presence of monoclonal protein is unclear at this time. Suggest   repeat in 3 to 6 months if clinically indicated                   Assessment/Plan  In summary this patient is a very complicated person who has dementia who has been sent from a nursing facility with upper GI bleeding documented on endoscopy, duodenal ulcer and multiple lesions in the stomach consistent with gastritis. Biopsies were not obtained given the fact that the patient was taking anticoagulants at that point. At the same time she has developed abnormalities on liver function test consistent with cholestasis, elevation of the alkaline phosphatase and bilirubin, she is jaundice and the patient has a nondistended gallbladder even though her abdominal exam remains abnormal to me.     The patient also has developed increase in the creatinine, normal potassium, normal CO2, in spite of volume resuscitation the creatinine has not changed. The patient has low platelet count, her IPF is 4.6 and her platelet count is slowly improving from yesterday.     The patient remains on IV antibiotics.    I have reviewed the patient's peripheral blood showing no evidence of schistocytes, no nucleated red cells in circulation, decreased platelets with no platelet clumps. White cell is increased with left shifted maturation and toxic granulations in the white cells polys. This altogether puts into some sort of an intraabdominal infection to me. Antibiotics are  ongoing. If the patient is going to make it or not I doubt and I think personally this patient should be NO CODE 300.     I have nothing to add to the patient's care at this point. Her platelet count is improving, her clinical examination is unchanged and her comorbidities and all of the issues are extremely worrisome.       The clinical circumstance of the patient is even more compromised now that the CT scan of the head discloses probably a new vascular insult to her brain.      Her serum protein electrophoresis discloses no evidence of monoclonal protein    Her liver enzymes and alkaline phosphatase remain elevated as well as her bilirubin.      Given the circumstances I do not have too much to add to the patient’s care.  I am going to sign off regarding her care and it will be up to the primary team to proceed with further testing and therapy according to needs.     I wore gloves and performed proper hand hygiene before and after this assessment and wore my personal protection mask during the room visit with the patient today.                           4/1/2020      CC:

## 2020-04-01 NOTE — PROGRESS NOTES
[facility]  Inpatient Progress Note    Reason for Follow Up:  UGIB due to DU    Subjective     Interval History:   Patient is nonverbal.  Discussed care with nursing.  Patient has not had a bowel movement in 48 hours.  She continues to have bleeding from her lips teeth and gums.  Patient responds to commands.  She is squeeze both of my hands when instructed to and she indicated that she did not have abdominal pain by shaking her head no.    CT of the head from yesterday indicates possible new MCA stroke.    Current Facility-Administered Medications:   •  acetaminophen (TYLENOL) tablet 650 mg, 650 mg, Oral, Q4H PRN **OR** acetaminophen (TYLENOL) suppository 650 mg, 650 mg, Rectal, Q4H PRN, Boogie Saavedra MD  •  cefTRIAXone (ROCEPHIN) IVPB 1 g, 1 g, Intravenous, Q24H, Boogie Saavedra MD, Last Rate: 100 mL/hr at 03/31/20 2014, 1 g at 03/31/20 2014  •  dextrose (D50W) 25 g/ 50mL Intravenous Solution 25 g, 25 g, Intravenous, Q15 Min PRN, Boogie Saavedra MD, 25 g at 03/30/20 0033  •  dextrose (GLUTOSE) oral gel 15 g, 15 g, Oral, Q15 Min PRN, Boogie Saavedra MD  •  glucagon (human recombinant) (GLUCAGEN DIAGNOSTIC) injection 1 mg, 1 mg, Subcutaneous, Q15 Min PRN, Boogie Saavedra MD  •  insulin lispro (humaLOG) injection 0-9 Units, 0-9 Units, Subcutaneous, Q4H, Boogie Saavedra MD, 2 Units at 04/01/20 0209  •  ipratropium-albuterol (DUO-NEB) nebulizer solution 3 mL, 3 mL, Nebulization, Q2H PRN, Boogie Saavedra MD  •  magic mouthwash oral supsension 15 mL, 15 mL, Swish & Spit, Q4H PRN, Mario Saavedra MD, 15 mL at 03/30/20 2022  •  ondansetron (ZOFRAN) tablet 4 mg, 4 mg, Oral, Q6H PRN **OR** ondansetron (ZOFRAN) injection 4 mg, 4 mg, Intravenous, Q6H PRN, Boogie Saavedra MD  •  pantoprazole (PROTONIX) 40 mg/100 mL (0.4 mg/mL) in 0.9% NS IVPB, 8 mg/hr, Intravenous, Continuous, Boogie Saavedra MD, Last Rate: 20 mL/hr at 04/01/20 0732, 8 mg/hr at 04/01/20 0732  Review of Systems:    No  fevers or chills, no abdominal pain or blood in the stool    Objective     Vital Signs  Temp:  [97.9 °F (36.6 °C)-98.8 °F (37.1 °C)] 98.6 °F (37 °C)  Heart Rate:  [64-73] 73  Resp:  [16-18] 16  BP: (137-181)/(71-91) 175/77  Body mass index is 24.08 kg/m².    Intake/Output Summary (Last 24 hours) at 4/1/2020 1034  Last data filed at 4/1/2020 0344  Gross per 24 hour   Intake --   Output 800 ml   Net -800 ml     No intake/output data recorded.     Physical Exam:   General: patient awake, follows some commands   ENT: Normal lids and lashes, no scleral icterus; crusted blood on her lips and gums, oozing from her mouth   Neck: supple, normal ROM   Skin: warm and dry, not jaundiced   Abdomen: soft, nontender, nondistended; normal bowel sounds   Extremities: Bilateral upper extremity edema   Psychiatric: Unable to assess due to mental status changes     Results Review:     I reviewed the patient's new clinical results.    Results from last 7 days   Lab Units 04/01/20 0520 03/31/20  0511 03/30/20  1428 03/30/20  0632   WBC 10*3/mm3 15.83* 20.66*  --  16.28*   HEMOGLOBIN g/dL 8.8* 10.2*  --  9.8*   HEMATOCRIT % 25.8* 30.1*  --  27.9*   PLATELETS 10*3/mm3 83* 87* 45* 50*     Results from last 7 days   Lab Units 04/01/20 0520 03/31/20  0511 03/30/20  0632   SODIUM mmol/L 142 138 135*   POTASSIUM mmol/L 3.8 4.1 4.4   CHLORIDE mmol/L 97* 93* 89*   CO2 mmol/L 25.6 27.1 30.6*   BUN mg/dL 134* 119* 112*   CREATININE mg/dL 6.02* 5.87* 5.75*   CALCIUM mg/dL 6.6* 6.8* 6.7*   BILIRUBIN mg/dL 4.6* 5.7* 5.6*   ALK PHOS U/L 438* 461* 415*   ALT (SGPT) U/L 59* 73* 80*   AST (SGOT) U/L 86* 97* 110*   GLUCOSE mg/dL 168* 121* 126*     Results from last 7 days   Lab Units 04/01/20  0520 03/31/20  1440 03/30/20  1536   INR  1.79* 1.73* 1.62*     Lab Results   Lab Value Date/Time    LIPASE 193 (H) 04/01/2020 0520    LIPASE 207 (H) 03/27/2020 1033       Radiology:  CT Head Without Contrast         CT Abdomen Pelvis Without Contrast   Final  Result   Extensive third spacing. There is a small volume of ascites.   No focal hematoma or abnormal appearing bowel is present. L2 superior   endplate compression fracture is favored to be old.       This report was finalized on 3/30/2020 4:01 PM by Dr. Ajit Day M.D.          US Abdomen Complete   Final Result       1. Patient is noted to have some gallbladder wall thickening and edema.   Appearance is nonspecific, and can be associated with a variety of   etiologies. No stones or sludge are seen. Line   2. Mild pancreatic ductal dilatation, uncertain clinical significance.   There is no intra or extrahepatic biliary dilatation.   3. Small right renal angiomyolipoma.       This report was finalized on 3/27/2020 11:54 PM by Dr. Di Tong M.D.          XR Chest Post CVA Port   Final Result       Right IJ catheter. No pneumothorax. Mild cardiomegaly. Tortuous aorta.               This report was finalized on 3/27/2020 6:12 PM by Dr. Arsalan Bedoya M.D.          CT Abdomen Pelvis Without Contrast   Final Result   1. Mild L2 compression deformity which could be acute or subacute. No   definite spinal canal stenosis is seen.   2. Small probable angiomyolipoma in the right kidney.   3. There may be a small amount of sludge in the gallbladder. No   gallstones are seen.           Radiation dose reduction techniques were utilized, including automated   exposure control and exposure modulation based on body size.       This report was finalized on 3/27/2020 3:02 PM by Dr. Stuart Dubois M.D.              Assessment/Plan     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)   • Right-sided headache   • Acute ischemic stroke (CMS/HCC)   • Embolic stroke (CMS/HCC)   • Anticoagulated by anticoagulation treatment   • Stroke (cerebrum)  (CMS/HCC)   • Dysthymic disorder   • Hypotension   • Upper GI bleed       Assessment:  1.  Upper GI bleed: EGD on the 28th showing a duodenal bulb ulcer and some erosive gastritis.  However there appears to also be a component of oral bleeding      2.  Oral bleeding: This appears to be coming from her mouth at the current time     3.  Acute blood loss anemia: With #1 and #2     4.  Altered mental status, history of dementia: She is not communicating, she is not cooperating, unclear baselin     5.  Anticoagulated: She had been on Eliquis and Plavix, these have been held     6. Thrombocytopenia, leukocytosis: hematology on board     7. Elevated liver enzymes, bilirubin: mixed pattern, new this admission and normal 8/2019; normal liver on imaging, negative hepatitis panel, some decline today     8. Positive HSV antbodies: unclear if this is underlying her symptoms    9. Possible new CVA - CT 3/31    Plan:  · Hemoglobin down today-no evidence of GI bleeding but continues to have oozing from her mouth.  Etiology of this is uncertain but certainly exacerbated by her thrombocytopenia and coagulopathy.  Anticoagulation has been held since admission.  · Per nursing, family undecided about pursuing hemodialysis.  · Continue Protonix drip for now.  · Based on family's decision we will need to make plans to initiate some means of nutrition.  She is not eating.  If dialysis is pursued she will need placement of a core track or [place] for enteral feeding   · Prognosis is poor at this time given multisystem organ failure and possible new CVA      I discussed the patients findings and my recommendations with patient and nursing staff.    Malathi Bright MD

## 2020-04-01 NOTE — PLAN OF CARE
Pt disoriented x4, restless at times, remains in bilateral wrist restraints to prevent pulling lines, mouth/lips suctioned with blood/clots frequently, hernandez cath in place, ij dsg changed, dialysis catheter placed at bedside by , vss ,will ctm.      Problem: Restraint, Nonbehavioral (Nonviolent)  Goal: Nonbehavioral (Nonviolent) Restraint: Absence of Injury/Harm  Outcome: Ongoing (interventions implemented as appropriate)     Problem: Renal Failure/Kidney Injury, Acute (Adult)  Goal: Signs and Symptoms of Listed Potential Problems Will be Absent, Minimized or Managed (Renal Failure/Kidney Injury, Acute)  Outcome: Ongoing (interventions implemented as appropriate)     Problem: Gastrointestinal Bleeding (Adult)  Goal: Signs and Symptoms of Listed Potential Problems Will be Absent, Minimized or Managed (Gastrointestinal Bleeding)  Outcome: Ongoing (interventions implemented as appropriate)

## 2020-04-01 NOTE — PROCEDURES
Date of Admission:  3/27/2020  Today's Date:  04/01/20  Alexandre Louis MD  [facility]    Procedure Note  Non-tunneled central venous catheter placement for hemodialysis    Pre-op Diagnosis:   Acute renal failure    Post-op Diagnosis:     Same     Procedure:      1) Insertion of non-tunneled central venous catheter (11104)  2) Ultrasound-guided vascular access (97787)    Surgeon: Alexandre Louis MD    Assistant:  None, Provided critical assistance in exposure, retraction, and suction that overall decrease blood loss and operative time.    Anesthesia:   lidocaine 1% without epinephrine    Estimated Blood Loss:   Minimal    Complications:  None    Findings:   Successful placement of non tunneled dialysis catheter    Indications:    The patient is an 75 y.o. female referred for acute dialysis catheter placement secondary to Acute renal failure.  Written consent was obtained.  The risks, benefits, and turns were also discussed.  The patient was identified via the arm band and hospital assigned identification number.  Immediately prior to the procedure a timeout was called to verify the correct patient, procedure, equipment, support staff and the site/side was marked as required.       Procedure:  Site: Left internal jugular vein  Preparation: skin prepped with 2% chlorhexidine  Skin prep agent dried: skin prep agent completely dried prior to procedure  Sterile barriers: all five maximum sterile barriers used - cap, mask, sterile gown, sterile gloves, and large sterile sheet  Hand hygiene: hand hygiene performed prior to central venous catheter insertion  Patient position: Trendelenberg  Catheter type: double lumen with pigtail  Catheter size: 14 Fr 20 cm in the Left internal jugular vein  Ultrasound guidance: yes, with photographic documentation recorded in the chart  Number of attempts: 1  Successful placement: yes  Post-procedure: line sutured and dressing applied  Assessment: blood return through all  ports and was locked with concentrated heparin (1000units/cc)  Patient tolerated the procedure well with no immediate complications      Alexandre Louis MD     Date: 4/1/2020  Time: 16:38    Active Hospital Problems    Diagnosis  POA   • Upper GI bleed [K92.2]  Yes      Resolved Hospital Problems   No resolved problems to display.

## 2020-04-02 LAB
ALBUMIN SERPL-MCNC: 2.2 G/DL (ref 3.5–5.2)
ALBUMIN SERPL-MCNC: 2.3 G/DL (ref 3.5–5.2)
ALBUMIN/GLOB SERPL: 0.8 G/DL
ALBUMIN/GLOB SERPL: 0.9 G/DL
ALP SERPL-CCNC: 414 U/L (ref 39–117)
ALP SERPL-CCNC: 420 U/L (ref 39–117)
ALT SERPL W P-5'-P-CCNC: 51 U/L (ref 1–33)
ALT SERPL W P-5'-P-CCNC: 55 U/L (ref 1–33)
ANION GAP SERPL CALCULATED.3IONS-SCNC: 19 MMOL/L (ref 5–15)
ANION GAP SERPL CALCULATED.3IONS-SCNC: 19.3 MMOL/L (ref 5–15)
AST SERPL-CCNC: 95 U/L (ref 1–32)
AST SERPL-CCNC: 96 U/L (ref 1–32)
BASOPHILS # BLD AUTO: 0.01 10*3/MM3 (ref 0–0.2)
BASOPHILS # BLD AUTO: 0.02 10*3/MM3 (ref 0–0.2)
BASOPHILS NFR BLD AUTO: 0.1 % (ref 0–1.5)
BASOPHILS NFR BLD AUTO: 0.2 % (ref 0–1.5)
BILIRUB SERPL-MCNC: 3.2 MG/DL (ref 0.2–1.2)
BILIRUB SERPL-MCNC: 4.1 MG/DL (ref 0.2–1.2)
BUN BLD-MCNC: 122 MG/DL (ref 8–23)
BUN BLD-MCNC: 81 MG/DL (ref 8–23)
BUN/CREAT SERPL: 21.6 (ref 7–25)
BUN/CREAT SERPL: 21.6 (ref 7–25)
CALCIUM SPEC-SCNC: 7.6 MG/DL (ref 8.6–10.5)
CALCIUM SPEC-SCNC: 7.9 MG/DL (ref 8.6–10.5)
CHLORIDE SERPL-SCNC: 101 MMOL/L (ref 98–107)
CHLORIDE SERPL-SCNC: 102 MMOL/L (ref 98–107)
CO2 SERPL-SCNC: 24 MMOL/L (ref 22–29)
CO2 SERPL-SCNC: 25.7 MMOL/L (ref 22–29)
CREAT BLD-MCNC: 3.75 MG/DL (ref 0.57–1)
CREAT BLD-MCNC: 5.65 MG/DL (ref 0.57–1)
DEPRECATED RDW RBC AUTO: 49.9 FL (ref 37–54)
DEPRECATED RDW RBC AUTO: 50.8 FL (ref 37–54)
EOSINOPHIL # BLD AUTO: 0.01 10*3/MM3 (ref 0–0.4)
EOSINOPHIL # BLD AUTO: 0.03 10*3/MM3 (ref 0–0.4)
EOSINOPHIL NFR BLD AUTO: 0.1 % (ref 0.3–6.2)
EOSINOPHIL NFR BLD AUTO: 0.2 % (ref 0.3–6.2)
ERYTHROCYTE [DISTWIDTH] IN BLOOD BY AUTOMATED COUNT: 16 % (ref 12.3–15.4)
ERYTHROCYTE [DISTWIDTH] IN BLOOD BY AUTOMATED COUNT: 16.3 % (ref 12.3–15.4)
GFR SERPL CREATININE-BSD FRML MDRD: 12 ML/MIN/1.73
GFR SERPL CREATININE-BSD FRML MDRD: 7 ML/MIN/1.73
GFR SERPL CREATININE-BSD FRML MDRD: ABNORMAL ML/MIN/{1.73_M2}
GFR SERPL CREATININE-BSD FRML MDRD: ABNORMAL ML/MIN/{1.73_M2}
GLOBULIN UR ELPH-MCNC: 2.7 GM/DL
GLOBULIN UR ELPH-MCNC: 2.7 GM/DL
GLUCOSE BLD-MCNC: 133 MG/DL (ref 65–99)
GLUCOSE BLD-MCNC: 156 MG/DL (ref 65–99)
GLUCOSE BLDC GLUCOMTR-MCNC: 111 MG/DL (ref 70–130)
GLUCOSE BLDC GLUCOMTR-MCNC: 130 MG/DL (ref 70–130)
GLUCOSE BLDC GLUCOMTR-MCNC: 131 MG/DL (ref 70–130)
GLUCOSE BLDC GLUCOMTR-MCNC: 135 MG/DL (ref 70–130)
GLUCOSE BLDC GLUCOMTR-MCNC: 141 MG/DL (ref 70–130)
GLUCOSE BLDC GLUCOMTR-MCNC: 151 MG/DL (ref 70–130)
GLUCOSE BLDC GLUCOMTR-MCNC: 157 MG/DL (ref 70–130)
HCT VFR BLD AUTO: 23.3 % (ref 34–46.6)
HCT VFR BLD AUTO: 24.2 % (ref 34–46.6)
HGB BLD-MCNC: 7.7 G/DL (ref 12–15.9)
HGB BLD-MCNC: 8.1 G/DL (ref 12–15.9)
IMM GRANULOCYTES # BLD AUTO: 0.07 10*3/MM3 (ref 0–0.05)
IMM GRANULOCYTES # BLD AUTO: 0.14 10*3/MM3 (ref 0–0.05)
IMM GRANULOCYTES NFR BLD AUTO: 0.6 % (ref 0–0.5)
IMM GRANULOCYTES NFR BLD AUTO: 1 % (ref 0–0.5)
INR PPP: 1.69 (ref 0.9–1.1)
LYMPHOCYTES # BLD AUTO: 0.58 10*3/MM3 (ref 0.7–3.1)
LYMPHOCYTES # BLD AUTO: 0.76 10*3/MM3 (ref 0.7–3.1)
LYMPHOCYTES NFR BLD AUTO: 4.3 % (ref 19.6–45.3)
LYMPHOCYTES NFR BLD AUTO: 6.2 % (ref 19.6–45.3)
MAGNESIUM SERPL-MCNC: 2.1 MG/DL (ref 1.6–2.4)
MCH RBC QN AUTO: 28 PG (ref 26.6–33)
MCH RBC QN AUTO: 28.6 PG (ref 26.6–33)
MCHC RBC AUTO-ENTMCNC: 33 G/DL (ref 31.5–35.7)
MCHC RBC AUTO-ENTMCNC: 33.5 G/DL (ref 31.5–35.7)
MCV RBC AUTO: 83.7 FL (ref 79–97)
MCV RBC AUTO: 86.6 FL (ref 79–97)
MONOCYTES # BLD AUTO: 0.56 10*3/MM3 (ref 0.1–0.9)
MONOCYTES # BLD AUTO: 0.67 10*3/MM3 (ref 0.1–0.9)
MONOCYTES NFR BLD AUTO: 4.6 % (ref 5–12)
MONOCYTES NFR BLD AUTO: 5 % (ref 5–12)
NEUTROPHILS # BLD AUTO: 10.83 10*3/MM3 (ref 1.7–7)
NEUTROPHILS # BLD AUTO: 12.08 10*3/MM3 (ref 1.7–7)
NEUTROPHILS NFR BLD AUTO: 88.2 % (ref 42.7–76)
NEUTROPHILS NFR BLD AUTO: 89.5 % (ref 42.7–76)
NRBC BLD AUTO-RTO: 0 /100 WBC (ref 0–0.2)
NRBC BLD AUTO-RTO: 0 /100 WBC (ref 0–0.2)
PLATELET # BLD AUTO: 84 10*3/MM3 (ref 140–450)
PLATELET # BLD AUTO: 86 10*3/MM3 (ref 140–450)
PMV BLD AUTO: 12.3 FL (ref 6–12)
PMV BLD AUTO: 13 FL (ref 6–12)
POTASSIUM BLD-SCNC: 3.2 MMOL/L (ref 3.5–5.2)
POTASSIUM BLD-SCNC: 3.5 MMOL/L (ref 3.5–5.2)
PROT SERPL-MCNC: 4.9 G/DL (ref 6–8.5)
PROT SERPL-MCNC: 5 G/DL (ref 6–8.5)
PROTHROMBIN TIME: 19.6 SECONDS (ref 11.7–14.2)
RBC # BLD AUTO: 2.69 10*6/MM3 (ref 3.77–5.28)
RBC # BLD AUTO: 2.89 10*6/MM3 (ref 3.77–5.28)
SODIUM BLD-SCNC: 144 MMOL/L (ref 136–145)
SODIUM BLD-SCNC: 147 MMOL/L (ref 136–145)
TROPONIN T SERPL-MCNC: 0.02 NG/ML (ref 0–0.03)
WBC NRBC COR # BLD: 12.27 10*3/MM3 (ref 3.4–10.8)
WBC NRBC COR # BLD: 13.49 10*3/MM3 (ref 3.4–10.8)

## 2020-04-02 PROCEDURE — 85025 COMPLETE CBC W/AUTO DIFF WBC: CPT | Performed by: NURSE PRACTITIONER

## 2020-04-02 PROCEDURE — 63710000001 INSULIN LISPRO (HUMAN) PER 5 UNITS: Performed by: INTERNAL MEDICINE

## 2020-04-02 PROCEDURE — 99232 SBSQ HOSP IP/OBS MODERATE 35: CPT | Performed by: INTERNAL MEDICINE

## 2020-04-02 PROCEDURE — 85610 PROTHROMBIN TIME: CPT | Performed by: INTERNAL MEDICINE

## 2020-04-02 PROCEDURE — 82962 GLUCOSE BLOOD TEST: CPT

## 2020-04-02 PROCEDURE — 93005 ELECTROCARDIOGRAM TRACING: CPT | Performed by: INTERNAL MEDICINE

## 2020-04-02 PROCEDURE — 5A1D70Z PERFORMANCE OF URINARY FILTRATION, INTERMITTENT, LESS THAN 6 HOURS PER DAY: ICD-10-PCS | Performed by: INTERNAL MEDICINE

## 2020-04-02 PROCEDURE — 84484 ASSAY OF TROPONIN QUANT: CPT | Performed by: NURSE PRACTITIONER

## 2020-04-02 PROCEDURE — 80053 COMPREHEN METABOLIC PANEL: CPT | Performed by: INTERNAL MEDICINE

## 2020-04-02 PROCEDURE — 80053 COMPREHEN METABOLIC PANEL: CPT | Performed by: NURSE PRACTITIONER

## 2020-04-02 PROCEDURE — 99233 SBSQ HOSP IP/OBS HIGH 50: CPT | Performed by: INTERNAL MEDICINE

## 2020-04-02 PROCEDURE — 85025 COMPLETE CBC W/AUTO DIFF WBC: CPT | Performed by: INTERNAL MEDICINE

## 2020-04-02 PROCEDURE — 83735 ASSAY OF MAGNESIUM: CPT | Performed by: HOSPITALIST

## 2020-04-02 PROCEDURE — 99231 SBSQ HOSP IP/OBS SF/LOW 25: CPT | Performed by: PSYCHIATRY & NEUROLOGY

## 2020-04-02 PROCEDURE — 93010 ELECTROCARDIOGRAM REPORT: CPT | Performed by: INTERNAL MEDICINE

## 2020-04-02 RX ADMIN — SODIUM CHLORIDE 8 MG/HR: 900 INJECTION INTRAVENOUS at 09:54

## 2020-04-02 RX ADMIN — SODIUM CHLORIDE 8 MG/HR: 900 INJECTION INTRAVENOUS at 14:48

## 2020-04-02 RX ADMIN — INSULIN LISPRO 2 UNITS: 100 INJECTION, SOLUTION INTRAVENOUS; SUBCUTANEOUS at 05:33

## 2020-04-02 RX ADMIN — SODIUM CHLORIDE 8 MG/HR: 900 INJECTION INTRAVENOUS at 04:59

## 2020-04-02 RX ADMIN — SODIUM CHLORIDE 8 MG/HR: 900 INJECTION INTRAVENOUS at 20:13

## 2020-04-02 RX ADMIN — SODIUM CHLORIDE 8 MG/HR: 900 INJECTION INTRAVENOUS at 00:00

## 2020-04-02 NOTE — PROGRESS NOTES
[facility] HOSPITALIST    PROGRESS NOTE    Name:  Darby Workman   Age:  75 y.o.  Sex:  female  :  1945  MRN:  [id number]   Visit Number:  [id number]  Admission Date:  3/27/2020  Date Of Service:  20  Primary Care Physician:  Eric Matta MD     LOS: 6 days :  Patient Care Team:  Eric Matta MD as PCP - General (General Practice):    History taken from:     chart    Chief Complaint:      Bleeding from the mouth    Subjective     Interval History:     Patient seen and examined today.  Reviewed history and physical, lab work, x-rays, chart.    Patient was admitted on 3/27/2020 from a nursing home as she had hematochezia.  She has history of CVA of the cerebellum, coronary artery disease, diabetes mellitus type 2, MI, hyperlipidemia, hypertension.  She was in hemorrhagic shock and admitted to the ICU.  She was noted to have coagulopathy due to Eliquis.  K Centra reversal was given.  She was placed on Protonix drip, transfused.  Her hemoglobin currently is 8.1.  She appears to have lost a system organ failure with elevated liver enzymes, and worsening creatinine.  Gastroenterology as well as nephrology were consulted.  EGD done by gastroenterology did show duodenal bulb ulcer and gastritis.  Patient continues on Protonix drip.    Nephrology has been consulted.  Patient is noted to have uremia as well as renal failure.  She was started on hemodialysis.  She continues to have metabolic encephalopathy.    Her liver enzymes continue to be elevated with low platelet count and increased INR.  She has oral bleeding as well.  She was initially on antibiotics, these have been discontinued.  Infectious disease was consulted and agreed with this.  They did not feel her confusion was due to infectious cause.    Neurology was consulted.  Patient has significant metabolic encephalopathy on top of organic brain syndrome and did not feel that she had a new CVA.  Patient has significant  parkinsonism with rigidity.  CT did not show any abnormality.  They recommended no further neurological work-up.  Carotid Doppler showed it normal on the right and normal stent on the left.  They noted that the patient had a recent left hemisphere stroke which was clear that would have affected her speech function.  They have signed off.    Patient does not answer any questions.  She continues on hemodialysis.  She has multisystem organ failure.  Her prognosis for recovery is very small.  Will consult palliative care at this point as efforts by multiple physicians to speak to the family regarding prognosis in regards to treatment and CODE STATUS have not been successful at changing 's opinion to pursue full treatment.    Review of Systems:     Unable to obtain due to metabolic encephalopathy    Objective     Vital Signs:    Temp:  [97.7 °F (36.5 °C)-98.4 °F (36.9 °C)] 98 °F (36.7 °C)  Heart Rate:  [67-73] 73  Resp:  [16-20] 16  BP: (133-188)/(65-95) 133/65    Physical Exam:    General: Alert and oriented x0, no acute distress  ENT: Noted to be bleeding from the mouth.    Heart: Regular rate and rhythm without murmur rub or thrill  Lungs: Clear to auscultation bilaterally without use of accessory muscles respiration  Abdomen: Soft/nontender/nondistended.  No hepatosplenomegaly noted.  MSK: Not moving any extremities       Results Review:      I reviewed the patient's new clinical results.    Labs:    Lab Results (last 24 hours)     Procedure Component Value Units Date/Time    POC Glucose Once [[id number]]  (Abnormal) Collected:  04/02/20 0746    Specimen:  Blood Updated:  04/02/20 0747     Glucose 141 mg/dL     Comprehensive Metabolic Panel [[id number]]  (Abnormal) Collected:  04/02/20 0534    Specimen:  Blood Updated:  04/02/20 0628     Glucose 156 mg/dL       mg/dL      Creatinine 5.65 mg/dL      Sodium 147 mmol/L      Potassium 3.2 mmol/L      Chloride 102 mmol/L      CO2 25.7 mmol/L      Calcium 7.6  mg/dL      Total Protein 5.0 g/dL      Albumin 2.30 g/dL      ALT (SGPT) 55 U/L      AST (SGOT) 95 U/L      Alkaline Phosphatase 420 U/L      Total Bilirubin 4.1 mg/dL      eGFR Non African Amer 7 mL/min/1.73      Comment: <15 Indicative of kidney failure.        eGFR   Amer --     Comment: <15 Indicative of kidney failure.        Globulin 2.7 gm/dL      A/G Ratio 0.9 g/dL      BUN/Creatinine Ratio 21.6     Anion Gap 19.3 mmol/L     Narrative:       GFR Normal >60  Chronic Kidney Disease <60  Kidney Failure <15      Magnesium [[id number]]  (Normal) Collected:  04/02/20 0534    Specimen:  Blood Updated:  04/02/20 0620     Magnesium 2.1 mg/dL     POC Glucose Once [[id number]]  (Abnormal) Collected:  04/02/20 0608    Specimen:  Blood Updated:  04/02/20 0610     Glucose 151 mg/dL     Protime-INR [[id number]]  (Abnormal) Collected:  04/02/20 0534    Specimen:  Blood Updated:  04/02/20 0602     Protime 19.6 Seconds      INR 1.69    CBC & Differential [[id number]] Collected:  04/02/20 0534    Specimen:  Blood Updated:  04/02/20 0551    Narrative:       The following orders were created for panel order CBC & Differential.  Procedure                               Abnormality         Status                     ---------                               -----------         ------                     CBC Auto Differential[[id number]]        Abnormal            Final result                 Please view results for these tests on the individual orders.    CBC Auto Differential [[id number]]  (Abnormal) Collected:  04/02/20 0534    Specimen:  Blood Updated:  04/02/20 0551     WBC 13.49 10*3/mm3      RBC 2.89 10*6/mm3      Hemoglobin 8.1 g/dL      Hematocrit 24.2 %      MCV 83.7 fL      MCH 28.0 pg      MCHC 33.5 g/dL      RDW 16.3 %      RDW-SD 49.9 fl      MPV 12.3 fL      Platelets 86 10*3/mm3      Neutrophil % 89.5 %      Lymphocyte % 4.3 %      Monocyte % 5.0 %      Eosinophil % 0.1 %      Basophil % 0.1 %      Immature Grans %  1.0 %      Neutrophils, Absolute 12.08 10*3/mm3      Lymphocytes, Absolute 0.58 10*3/mm3      Monocytes, Absolute 0.67 10*3/mm3      Eosinophils, Absolute 0.01 10*3/mm3      Basophils, Absolute 0.01 10*3/mm3      Immature Grans, Absolute 0.14 10*3/mm3      nRBC 0.0 /100 WBC     POC Glucose Once [[id number]]  (Abnormal) Collected:  04/02/20 0506    Specimen:  Blood Updated:  04/02/20 0508     Glucose 157 mg/dL     POC Glucose Once [[id number]]  (Abnormal) Collected:  04/02/20 0125    Specimen:  Blood Updated:  04/02/20 0129     Glucose 131 mg/dL     POC Glucose Once [[id number]]  (Abnormal) Collected:  04/01/20 2012    Specimen:  Blood Updated:  04/01/20 2013     Glucose 162 mg/dL     POC Glucose Once [[id number]]  (Abnormal) Collected:  04/01/20 1635    Specimen:  Blood Updated:  04/01/20 1637     Glucose 144 mg/dL     Blood Culture - Blood, Arm, Left [[id number]] Collected:  03/30/20 1614    Specimen:  Blood from Arm, Left Updated:  04/01/20 1630     Blood Culture No growth at 2 days    Blood Culture - Blood, Arm, Left [[id number]] Collected:  03/30/20 1439    Specimen:  Blood from Arm, Left Updated:  04/01/20 1445     Blood Culture No growth at 2 days           Radiology:    Imaging Results (Last 24 Hours)     Procedure Component Value Units Date/Time    XR Chest Post CVA Port [[id number]] Collected:  04/01/20 1752     Updated:  04/01/20 1759    Narrative:       Portable chest radiograph     HISTORY:Line placement     TECHNIQUE: Single AP portable radiograph of the chest     COMPARISON:Chest radiograph 03/27/2020       Impression:       FINDINGS AND IMPRESSION:  Bilateral internal jugular catheters are present and terminates within  the right atrium. The left internal jugular catheter is new since  03/27/2020.     There appears to be retrocardiac pulmonary opacification which is  increased since 03/27/2020 present and concerning for atelectasis versus  early pneumonia in the appropriate clinical context and correlation  with  patient history is recommended. No pneumothorax is seen. The previously  seen small pleural effusions within the bilateral lungs on CT abdomen  and pelvis are not well visualized. The heart is mildly enlarged.     This report was finalized on 4/1/2020 5:56 PM by Dr. Anthony Starks M.D.       CT Head Without Contrast [[id number]] Collected:  03/31/20 1419     Updated:  04/01/20 1431    Narrative:       CT HEAD WITHOUT CONTRAST     HISTORY:  Altered mental status.     COMPARISON: CT head 08/11/2019.     FINDINGS: A remote infarct is appreciated involving the left parietal  lobe posteriorly and the temporal occipital region inferior laterally  present previously.     There is decreased attenuation involving the insular cortex on the left  and operculum worrisome for an acute left MCA distribution infarct. The  area of decreased attenuation is somewhat ill-defined but measures  approximately 4.3 cm in AP dimension. There is no evidence of  hemorrhage. Further evaluation with MRI examination of the brain with  and without contrast is recommended. The above information was  immediately called to the patient's nurse at the time of dictation. The  patient's nurse is to immediately relay the information to the clinical  service.           Radiation dose reduction techniques were utilized, including automated  exposure control and exposure modulation based on body size.     This report was finalized on 4/1/2020 2:28 PM by Dr. Jonathan Garcia M.D.             Medication Review:       insulin lispro 0-9 Units Subcutaneous Q4H         pantoprazole 8 mg/hr Last Rate: 8 mg/hr (04/02/20 0954)       Assessment/Plan     Problem List Items Addressed This Visit        Digestive    * (Principal) Upper GI bleed - Primary    Relevant Orders    Case Request (Completed)      Other Visit Diagnoses     Acute renal failure, unspecified acute renal failure type (CMS/HCC)        Elevated LFTs        Hyperkalemia              1.  Upper GI  bleed with EGD showing duodenal bulb ulcer and erosive gastritis on 3/28/2020.  2.  Oral mucosa bleeding  3.  Acute blood loss anemia  4.  Metabolic encephalopathy with history of dementia, unclear baseline.  Uremia contributing.  5.  Previous anticoagulation with Eliquis as well as on Plavix which have been held.  6.  Acute renal failure with unknown baseline.  Patient on hemodialysis with associated uremia.  7.  Diabetes mellitus type 2  8.  Essential hypertension  9.  Coagulopathy, suspect due to liver failure.  10.  Liver failure     Plan:    Continue with hemodialysis at the family's request.  Monitor hemoglobin transfuse as necessary.  Will consult palliative care as the patient has extremely poor prognosis.  She has multiple organ systems failing including her kidneys and her liver.  She also has a history of dementia and previous CVA.  Would recommend hospice for this patient.  Family has been resistant.  Gastroenterology has signed off as they recommend no further intervention at this point.  Neurology has nothing more to offer and has signed off.  Patient continues on Protonix drip.  Nephrology is planning hemodialysis, but they do recommend palliative care as well.  Patient is off antibiotics at this point at the recommendation of infectious disease.  Continue to monitor closely.  Patient continues to be full code despite multiple discussions with physicians.  Further recommendations will depend on the clinical course.  Roni Gutierrez,   04/02/20  14:07

## 2020-04-02 NOTE — PROGRESS NOTES
"[facility] PULMONARY CARE         Dr Rodriguez Sayied   LOS: 6 days   Patient Care Team:  Eric Matta MD as PCP - General (General Practice)    Chief Complaint: Status post GI bleed hemorrhagic shock oral bleeding worsening renal failure multiple medical issues as listed below.    Interval History: Patient seen during dialysis.  She is currently getting dialysis mental status remains poor.  Remains confused    REVIEW OF SYSTEMS:   Confused    Ventilator/Non-Invasive Ventilation Settings (From admission, onward)    None            Vital Signs  Temp:  [98 °F (36.7 °C)-98.7 °F (37.1 °C)] 98.4 °F (36.9 °C)  Heart Rate:  [67-73] 73  Resp:  [16-18] 16  BP: (158-188)/(70-95) 185/82    Intake/Output Summary (Last 24 hours) at 4/2/2020 1103  Last data filed at 4/2/2020 0948  Gross per 24 hour   Intake 0 ml   Output 2300 ml   Net -2300 ml     Flowsheet Rows      First Filed Value   Admission Height  165.1 cm (65\") Documented at 03/27/2020 1029   Admission Weight  60.3 kg (133 lb) Documented at 03/27/2020 1029          Physical Exam:   General Appearance:   Confused with extensive oral bleeding noted.  Eyes scleral icterus   Lungs:    Diminished breath sounds rhonchi bilaterally on the basis    Heart:    Regular rhythm and normal rate, normal S1 and S2, no            murmur, no gallop, no rub, no click   Chest Wall:    No abnormalities observed   Abdomen:    Soft mild diffuse tenderness.  No rebound or guarding   Extremities:   Moves all extremities well, no edema, no cyanosis, no             redness  CNS confused     Results Review:        Results from last 7 days   Lab Units 04/02/20  0534 04/01/20  0520 03/31/20  0511   SODIUM mmol/L 147* 142 138   POTASSIUM mmol/L 3.2* 3.8 4.1   CHLORIDE mmol/L 102 97* 93*   CO2 mmol/L 25.7 25.6 27.1   BUN mg/dL 122* 134* 119*   CREATININE mg/dL 5.65* 6.02* 5.87*   GLUCOSE mg/dL 156* 168* 121*   CALCIUM mg/dL 7.6* 6.6* 6.8*         Results from last 7 days   Lab Units 04/02/20  0534 " "04/01/20  0520 03/31/20  0511   WBC 10*3/mm3 13.49* 15.83* 20.66*   HEMOGLOBIN g/dL 8.1* 8.8* 10.2*   HEMATOCRIT % 24.2* 25.8* 30.1*   PLATELETS 10*3/mm3 86* 83* 87*     Results from last 7 days   Lab Units 04/02/20  0534 04/01/20  0520 03/31/20  1440 03/30/20  1536   INR  1.69* 1.79* 1.73* 1.62*   APTT seconds  --   --   --  46.6*         Results from last 7 days   Lab Units 04/02/20  0534   MAGNESIUM mg/dL 2.1               I reviewed the patient's new clinical results.  I personally viewed and interpreted the patient's CXR        Medication Review:     insulin lispro 0-9 Units Subcutaneous Q4H         pantoprazole 8 mg/hr Last Rate: 8 mg/hr (04/02/20 0954)       ASSESSMENT:   1. Hemorrhagic shock -- better  2. Coagulopathy due to eliquis -- s/p kcentra reversal.  3. Acute blood loss anemia due to GI bleed-- protonix gtt, transfusion as needed, monitor hemoglobin EGD with DU and gastritis  4. Acute kidney injury -- iv fluids and monitor renal function, discussed with Dr. Atkins   5. Hyperkalemia --  \"  \"  6. Hyponatremia -- monitor  7. Elevated liver enzymes -- ct abd/pelvis ok, ultrasound done, gi following  8. Acute pancreatitis mild  9. Sepsis -- can't r/o --continue with rocephin,   10. Dementia not at baseline -- she is on neurotnin, ativan, and zyprexa at home, will have to hold these for now  11. DMII with hyperglycemia -- ssi --accuchecks ok  12. Hyperlipidemia -- hold statin  13. Thrombocytopenia -worsening  14. Small hiatal hernia  15. Erosive gastritis  16. 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  Encephalopathy    PLAN:  Mental status remains poor and noted input from neurology regarding possibility of subacute CVA.  Unable to anticoagulate due to current bleeding condition.  We will see if dialysis will help her mental status.  Tunnel catheter placed and dialysis initiated per nephrology.  Remains on PPI drip with falling hemoglobin.  Not sure drop in hemoglobin due to GI source versus oral component  ID " input noted.  They have stopped antibiotics and continues to observe  Hematology input noted with improving platelet counts.  I had a detailed discussion with the patient's  who at this time wishes to pursue full code.  I explained to him poor prognosis etc.  He wishes to continue with current care for now.  Due to poor mental status she is not able to eat.  May have to consider alternative means of nutrition if mental status does not improve post dialysis.  Appreciate input from hospitalist in managing multiple medical issues.  Since patient still a full code and high risk for being transferred to the ICU we will follow along for now    Jennifer Bryan MD  04/02/20  11:03

## 2020-04-02 NOTE — PROGRESS NOTES
[facility]  Inpatient Progress Note    Reason for Follow Up: Duodenal ulcer, elevated liver enzymes    Subjective     Interval History:   Patient is now getting dialysis.  She remains nonresponsive, altered.  Still with dried blood dripping down her chin and in her mouth.    Current Facility-Administered Medications:   •  acetaminophen (TYLENOL) tablet 650 mg, 650 mg, Oral, Q4H PRN **OR** acetaminophen (TYLENOL) suppository 650 mg, 650 mg, Rectal, Q4H PRN, Boogie Saavedra MD  •  dextrose (D50W) 25 g/ 50mL Intravenous Solution 25 g, 25 g, Intravenous, Q15 Min PRN, Boogie Saavedra MD, 25 g at 03/30/20 0033  •  dextrose (GLUTOSE) oral gel 15 g, 15 g, Oral, Q15 Min PRN, Boogie Saavedra MD  •  glucagon (human recombinant) (GLUCAGEN DIAGNOSTIC) injection 1 mg, 1 mg, Subcutaneous, Q15 Min PRN, Boogie Saavedra MD  •  insulin lispro (humaLOG) injection 0-9 Units, 0-9 Units, Subcutaneous, Q4H, Boogie Saavedra MD, 2 Units at 04/02/20 0533  •  ipratropium-albuterol (DUO-NEB) nebulizer solution 3 mL, 3 mL, Nebulization, Q2H PRN, Boogie Saavedra MD  •  magic mouthwash oral supsension 15 mL, 15 mL, Swish & Spit, Q4H PRN, Mario Saavedra MD, 15 mL at 04/01/20 2022  •  ondansetron (ZOFRAN) tablet 4 mg, 4 mg, Oral, Q6H PRN **OR** ondansetron (ZOFRAN) injection 4 mg, 4 mg, Intravenous, Q6H PRN, Boogie Saavedra MD  •  pantoprazole (PROTONIX) 40 mg/100 mL (0.4 mg/mL) in 0.9% NS IVPB, 8 mg/hr, Intravenous, Continuous, Boogie Saavedra MD, Last Rate: 20 mL/hr at 04/02/20 0954, 8 mg/hr at 04/02/20 0954  Review of Systems:     Unable to obtain review of systems due to: Patient nonverbal        Objective     Vital Signs  Temp:  [98 °F (36.7 °C)-98.7 °F (37.1 °C)] 98.4 °F (36.9 °C)  Heart Rate:  [67-73] 73  Resp:  [16-18] 16  BP: (158-188)/(70-95) 185/82  Body mass index is 22.63 kg/m².    Intake/Output Summary (Last 24 hours) at 4/2/2020 1023  Last data filed at 4/2/2020 0948  Gross per 24 hour   Intake  0 ml   Output 2300 ml   Net -2300 ml     I/O this shift:  In: 0   Out: 1100 [Urine:1100]     Physical Exam:   General: Acutely and chronically ill-appearing, not responding to voice, ill appearing with dried blood at her  mouth and chin   Mouth: dried blood at lips and in mouth   Eyes: Normal lids and lashes, no scleral icterus   Neck: supple, normal ROM   Skin: warm and dry, not jaundiced   Cardiovascular: regular rhythm and rate, no murmurs auscultated   Pulm: clear to auscultation bilaterally, regular and unlabored   Abdomen: soft, nontender, nondistended; normal bowel sounds   Rectal: deferred   Extremities: no rash or edema   Psychiatric: Obtunded     Results Review:     I reviewed the patient's new clinical results.    Results from last 7 days   Lab Units 04/02/20  0534 04/01/20  0520 03/31/20  0511   WBC 10*3/mm3 13.49* 15.83* 20.66*   HEMOGLOBIN g/dL 8.1* 8.8* 10.2*   HEMATOCRIT % 24.2* 25.8* 30.1*   PLATELETS 10*3/mm3 86* 83* 87*     Results from last 7 days   Lab Units 04/02/20  0534 04/01/20  0520 03/31/20  0511   SODIUM mmol/L 147* 142 138   POTASSIUM mmol/L 3.2* 3.8 4.1   CHLORIDE mmol/L 102 97* 93*   CO2 mmol/L 25.7 25.6 27.1   BUN mg/dL 122* 134* 119*   CREATININE mg/dL 5.65* 6.02* 5.87*   CALCIUM mg/dL 7.6* 6.6* 6.8*   BILIRUBIN mg/dL 4.1* 4.6* 5.7*   ALK PHOS U/L 420* 438* 461*   ALT (SGPT) U/L 55* 59* 73*   AST (SGOT) U/L 95* 86* 97*   GLUCOSE mg/dL 156* 168* 121*     Results from last 7 days   Lab Units 04/02/20  0534 04/01/20  0520 03/31/20  1440   INR  1.69* 1.79* 1.73*     Lab Results   Lab Value Date/Time    LIPASE 193 (H) 04/01/2020 0520    LIPASE 207 (H) 03/27/2020 1033       Radiology:  XR Chest Post CVA Port   Final Result   FINDINGS AND IMPRESSION:   Bilateral internal jugular catheters are present and terminates within   the right atrium. The left internal jugular catheter is new since   03/27/2020.       There appears to be retrocardiac pulmonary opacification which is   increased since  03/27/2020 present and concerning for atelectasis versus   early pneumonia in the appropriate clinical context and correlation with   patient history is recommended. No pneumothorax is seen. The previously   seen small pleural effusions within the bilateral lungs on CT abdomen   and pelvis are not well visualized. The heart is mildly enlarged.       This report was finalized on 4/1/2020 5:56 PM by Dr. Anthony Starks M.D.          CT Head Without Contrast   Final Result      CT Abdomen Pelvis Without Contrast   Final Result   Extensive third spacing. There is a small volume of ascites.   No focal hematoma or abnormal appearing bowel is present. L2 superior   endplate compression fracture is favored to be old.       This report was finalized on 3/30/2020 4:01 PM by Dr. Ajit Day M.D.          US Abdomen Complete   Final Result       1. Patient is noted to have some gallbladder wall thickening and edema.   Appearance is nonspecific, and can be associated with a variety of   etiologies. No stones or sludge are seen. Line   2. Mild pancreatic ductal dilatation, uncertain clinical significance.   There is no intra or extrahepatic biliary dilatation.   3. Small right renal angiomyolipoma.       This report was finalized on 3/27/2020 11:54 PM by Dr. Di Tong M.D.          XR Chest Post CVA Port   Final Result       Right IJ catheter. No pneumothorax. Mild cardiomegaly. Tortuous aorta.               This report was finalized on 3/27/2020 6:12 PM by Dr. Arsalan Bedoya M.D.          CT Abdomen Pelvis Without Contrast   Final Result   1. Mild L2 compression deformity which could be acute or subacute. No   definite spinal canal stenosis is seen.   2. Small probable angiomyolipoma in the right kidney.   3. There may be a small amount of sludge in the gallbladder. No   gallstones are seen.           Radiation dose reduction techniques were utilized, including automated   exposure control and exposure  modulation based on body size.       This report was finalized on 3/27/2020 3:02 PM by Dr. Stuart Dubois M.D.              Assessment/Plan     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)   • Right-sided headache   • Acute ischemic stroke (CMS/HCC)   • Embolic stroke (CMS/HCC)   • Anticoagulated by anticoagulation treatment   • Stroke (cerebrum) (CMS/HCC)   • Dysthymic disorder   • Hypotension   • Upper GI bleed   • Uremic encephalopathy   • Acute kidney injury (CMS/HCC)   • Acute pancreatitis   • Hemorrhagic shock (CMS/HCC)   • Thrombocytopenia (CMS/HCC)   • Type 2 diabetes mellitus with hyperglycemia (CMS/HCC)   • Dementia without behavioral disturbance (CMS/HCC)   • Acute posthemorrhagic anemia       Impression  1.  Upper GI bleed: EGD on the 28th showing a duodenal bulb ulcer and some erosive gastritis. She is on ppi.      2.  Oral bleeding: This appears to be coming from her mouth at the current time    3.  Acute blood loss anemia: With #1 and #2    4.  Altered mental status, history of dementia: She is not communicating, she is not cooperating, unclear baseline    5.  Anticoagulated: She had been on Eliquis and Plavix, these have been held    6. Thrombocytopenia, leukocytosis: hematology on board    7. Elevated liver enzymes and bilirubin: mixed pattern, new this admission and normal 8/2019; normal liver on imaging, negative hepatitis panel, some decline today    8. KIERAN: now on hemodialysis    Plan  Continue ppi    Her prognosis is poor, she appears to be actively declining with multi-organ failure and I do not see any intervention changing her decline.  I think palliative care is the kindest option for her at this time.    I attempted to call her  at the numbers listed at the face sheet--both home and cell phone-- but got no  answer.  I discussed with her nurse, that I think it would be worth attempting to face time so that the family could see what bad shape she is in prior to making any further decisions about continuing care.    GI will sign off but remain available as needed in this patient's care.  Thank you.          I discussed the patients findings and my recommendations with nursing staff.    Mary Kaufman MD

## 2020-04-02 NOTE — PROGRESS NOTES
"   LOS: 6 days    Patient Care Team:  Eric Matta MD as PCP - General (General Practice)    Chief Complaint:    Chief Complaint   Patient presents with   • Altered Mental Status   • Black or Bloody Stool     Follow UP KIERAN  Subjective     Interval History:     Seen and examined. Lethargic but aroused. Shiley was placed. Still with bleeding around he rmarpita D/W RN and her     Review of Systems:   Unable to obtain.     Objective     Vital Signs  Temp:  [98 °F (36.7 °C)-98.7 °F (37.1 °C)] 98.4 °F (36.9 °C)  Heart Rate:  [67-73] 73  Resp:  [16-18] 16  BP: (158-188)/(70-95) 185/82    Flowsheet Rows      First Filed Value   Admission Height  165.1 cm (65\") Documented at 03/27/2020 1029   Admission Weight  60.3 kg (133 lb) Documented at 03/27/2020 1029          I/O this shift:  In: -   Out: 700 [Urine:700]  I/O last 3 completed shifts:  In: 0   Out: 1550 [Urine:1550]    Intake/Output Summary (Last 24 hours) at 4/2/2020 0833  Last data filed at 4/2/2020 0703  Gross per 24 hour   Intake 0 ml   Output 1900 ml   Net -1900 ml       Physical Exam:  Elderly wf.  Oral mucosa with signif dried and oozing blood.    Neck no jvd  Heart RRR No s3 or rub  Lungs clear to auscultation, no wheezing  Abd Distended, slightly tender, no guarding or rebound  Body wall edema  Ext 1+ upper and lower ext edema.       Results Review:    Results from last 7 days   Lab Units 04/02/20  0534 04/01/20  0520 03/31/20  0511   SODIUM mmol/L 147* 142 138   POTASSIUM mmol/L 3.2* 3.8 4.1   CHLORIDE mmol/L 102 97* 93*   CO2 mmol/L 25.7 25.6 27.1   BUN mg/dL 122* 134* 119*   CREATININE mg/dL 5.65* 6.02* 5.87*   CALCIUM mg/dL 7.6* 6.6* 6.8*   BILIRUBIN mg/dL 4.1* 4.6* 5.7*   ALK PHOS U/L 420* 438* 461*   ALT (SGPT) U/L 55* 59* 73*   AST (SGOT) U/L 95* 86* 97*   GLUCOSE mg/dL 156* 168* 121*       Estimated Creatinine Clearance: 8.3 mL/min (A) (by C-G formula based on SCr of 5.65 mg/dL (H)).    Results from last 7 days   Lab Units 04/02/20  0534 "   MAGNESIUM mg/dL 2.1             Results from last 7 days   Lab Units 04/02/20  0534 04/01/20  0520 03/31/20  0511 03/30/20  1428 03/30/20  0632 03/30/20  0034  03/28/20  0430   WBC 10*3/mm3 13.49* 15.83* 20.66*  --  16.28*  --   --  11.76*   HEMOGLOBIN g/dL 8.1* 8.8* 10.2*  --  9.8* 9.6*   < > 10.5*   PLATELETS 10*3/mm3 86* 83* 87* 45* 50*  --   --  98*    < > = values in this interval not displayed.       Results from last 7 days   Lab Units 04/02/20  0534 04/01/20  0520 03/31/20  1440 03/30/20  1536   INR  1.69* 1.79* 1.73* 1.62*         Imaging Results (Last 24 Hours)     Procedure Component Value Units Date/Time    XR Chest Post CVA Port [[id number]] Collected:  04/01/20 1752     Updated:  04/01/20 1759    Narrative:       Portable chest radiograph     HISTORY:Line placement     TECHNIQUE: Single AP portable radiograph of the chest     COMPARISON:Chest radiograph 03/27/2020       Impression:       FINDINGS AND IMPRESSION:  Bilateral internal jugular catheters are present and terminates within  the right atrium. The left internal jugular catheter is new since  03/27/2020.     There appears to be retrocardiac pulmonary opacification which is  increased since 03/27/2020 present and concerning for atelectasis versus  early pneumonia in the appropriate clinical context and correlation with  patient history is recommended. No pneumothorax is seen. The previously  seen small pleural effusions within the bilateral lungs on CT abdomen  and pelvis are not well visualized. The heart is mildly enlarged.     This report was finalized on 4/1/2020 5:56 PM by Dr. Anthony Starks M.D.       CT Head Without Contrast [[id number]] Collected:  03/31/20 1419     Updated:  04/01/20 1431    Narrative:       CT HEAD WITHOUT CONTRAST     HISTORY:  Altered mental status.     COMPARISON: CT head 08/11/2019.     FINDINGS: A remote infarct is appreciated involving the left parietal  lobe posteriorly and the temporal occipital region inferior  laterally  present previously.     There is decreased attenuation involving the insular cortex on the left  and operculum worrisome for an acute left MCA distribution infarct. The  area of decreased attenuation is somewhat ill-defined but measures  approximately 4.3 cm in AP dimension. There is no evidence of  hemorrhage. Further evaluation with MRI examination of the brain with  and without contrast is recommended. The above information was  immediately called to the patient's nurse at the time of dictation. The  patient's nurse is to immediately relay the information to the clinical  service.           Radiation dose reduction techniques were utilized, including automated  exposure control and exposure modulation based on body size.     This report was finalized on 4/1/2020 2:28 PM by Dr. Jonathan Garcia M.D.             insulin lispro 0-9 Units Subcutaneous Q4H       pantoprazole 8 mg/hr Last Rate: 8 mg/hr (04/02/20 0459)       Medication Review:   Current Facility-Administered Medications   Medication Dose Route Frequency Provider Last Rate Last Dose   • acetaminophen (TYLENOL) tablet 650 mg  650 mg Oral Q4H PRN Boogie Saavedra MD        Or   • acetaminophen (TYLENOL) suppository 650 mg  650 mg Rectal Q4H PRN Boogie Saavedra MD       • dextrose (D50W) 25 g/ 50mL Intravenous Solution 25 g  25 g Intravenous Q15 Min PRN Boogie Saavedra MD   25 g at 03/30/20 0033   • dextrose (GLUTOSE) oral gel 15 g  15 g Oral Q15 Min PRN Boogie Saavedra MD       • glucagon (human recombinant) (GLUCAGEN DIAGNOSTIC) injection 1 mg  1 mg Subcutaneous Q15 Min PRN Boogie Saavedra MD       • insulin lispro (humaLOG) injection 0-9 Units  0-9 Units Subcutaneous Q4H Boogie Saavedra MD   2 Units at 04/02/20 0533   • ipratropium-albuterol (DUO-NEB) nebulizer solution 3 mL  3 mL Nebulization Q2H PRN Boogie Saavedra MD       • magic mouthwash oral supsension 15 mL  15 mL Swish & Spit Q4H PRN Mario Saavedra MD   15 mL at 04/01/20  2022   • ondansetron (ZOFRAN) tablet 4 mg  4 mg Oral Q6H PRN Boogie Saavedra MD        Or   • ondansetron (ZOFRAN) injection 4 mg  4 mg Intravenous Q6H PRN Boogie Saavedra MD       • pantoprazole (PROTONIX) 40 mg/100 mL (0.4 mg/mL) in 0.9% NS IVPB  8 mg/hr Intravenous Continuous Boogie Saavedra MD 20 mL/hr at 04/02/20 0459 8 mg/hr at 04/02/20 0459       Assessment/Plan   1. KIERAN with baseline unknown after 9/19 when renal function was normal.  I think that she is uremic. Waste products are high. Pre-renal picture with blood in GI tract.   Fortunately, K and bicarb are ok.  Volume excess by exam , but uop 1400 cc.  I talked at length with her  about her multisystem organ dysfunction including KIERAN, Liver enzyme elevation with coagulopathy, TCP, GI bleeding ( duodenal ulcer, erosive gastritis).      2. Oral mucosal bleeding without clear source.   3. Coagulopathy. .  4. TCP.   5. UGI bleeding with duodenal ulcer, gastritis.  Hg stable.   6. Elevated liver enzymes with distended gallbladder  7. Leukocytosis  8. Positive IGG HSV.  ID eval noted.   9. Dementia with superimposed metabolic encephalopathy.    Plan:  Plan for HD today and at AM  Palliative care is appropraite  Surveillance labs      Vish Rodriguez MD  04/02/20  08:33

## 2020-04-02 NOTE — PROGRESS NOTES
Patient Identification:  NAME:  Darby Workman  Age:  75 y.o.   Sex:  female   :  1945   MRN:  [id number]       Chief complaint: Metabolic encephalopathy    History of present illness: The patient is so lethargic that it is impossible to tell if she is able to speak or not since she is in this condition.  She does still resist eye opening and still appears to have some slight increased movement on the left side compared to the right recall this patient has had a recent left hemisphere stroke      Past medical history:  Past Medical History:   Diagnosis Date   • Acute cerebrovascular accident (CVA) of cerebellum (CMS/HCC)    • Acute ischemic stroke (CMS/HCC)    • Arthritis    • Coronary artery disease    • CVA (cerebral vascular accident) (CMS/HCC)    • Diabetes mellitus (CMS/HCC)    • Heart attack (CMS/HCC)    • History of blood clots    • Hyperlipidemia    • Hypertension    • Vision loss        Allergies:  Patient has no known allergies.    Home medications:  Medications Prior to Admission   Medication Sig Dispense Refill Last Dose   • acetaminophen (TYLENOL) 325 MG tablet Take 650 mg by mouth Every 6 (Six) Hours As Needed for Mild Pain .      • apixaban (ELIQUIS) 5 MG tablet tablet Take 1 tablet by mouth Every 12 (Twelve) Hours. 60 tablet  2019   • aspirin 325 MG tablet Take 1 tablet by mouth Daily.      • atorvastatin (LIPITOR) 80 MG tablet Take 1 tablet by mouth Every Night.      • brimonidine (ALPHAGAN) 0.2 % ophthalmic solution Administer 1 drop to both eyes 2 (Two) Times a Day.   2019 at Unknown time   • clopidogrel (PLAVIX) 75 MG tablet Take 1 tablet by mouth Daily. 30 tablet 0    • dorzolamide (TRUSOPT) 2 % ophthalmic solution Administer 1 drop to both eyes 2 (Two) Times a Day.   2019 at Unknown time   • ferrous sulfate 325 (65 FE) MG tablet Take 325 mg by mouth Daily With Breakfast.      • gabapentin (NEURONTIN) 100 MG capsule Take 1 capsule by mouth 2 (Two) Times a Day. 60  capsule 1    • glipiZIDE (GLUCOTROL) 5 MG tablet Take 1 tablet by mouth 2 (Two) Times a Day Before Meals.      • LORazepam (ATIVAN) 0.5 MG tablet Take 1 tablet by mouth 2 (Two) Times a Day. 60 tablet 0    • losartan 50 MG tablet 100 mg, hydrochlorothiazide 12.5 MG capsule 12.5 mg Take 1 dose by mouth Daily.      • naproxen (NAPROSYN) 500 MG tablet Take 500 mg by mouth 2 (Two) Times a Day With Meals.      • nebivolol (BYSTOLIC) 20 MG tablet Take 1 tablet by mouth 2 (Two) Times a Day.      • nitroglycerin (NITROSTAT) 0.4 MG SL tablet Place 1 tablet under the tongue Every 5 (Five) Minutes As Needed for Chest Pain (Systolic BP Greater Than 100). Max 3 doses in 15 minutes.  12    • pantoprazole (PROTONIX) 40 MG EC tablet Take 1 tablet by mouth 2 (Two) Times a Day Before Meals. (Patient taking differently: Take 40 mg by mouth Daily. Indications: Gastroesophageal Reflux Disease)      • PARoxetine (PAXIL) 10 MG tablet Take 1 tablet by mouth Daily. 30 tablet 0    • vitamin D (ERGOCALCIFEROL) 1.25 MG (43855 UT) capsule capsule Take 50,000 Units by mouth 1 (One) Time Per Week.      • aluminum-magnesium hydroxide-simethicone (MAALOX MAX) 400-400-40 MG/5ML suspension Take 15 mL by mouth Every 6 (Six) Hours As Needed for Indigestion or Heartburn.      • amLODIPine (NORVASC) 5 MG tablet Take 1 tablet by mouth 2 (Two) Times a Day.      • dextromethorphan-quinidine (NUEDEXTA) 20-10 MG capsule capsule Take 1 capsule by mouth 2 (Two) Times a Day. 60 capsule 1    • hydrALAZINE (APRESOLINE) 50 MG tablet Take 1 tablet by mouth Every 8 (Eight) Hours.      • insulin lispro (humaLOG) 100 UNIT/ML injection Inject 0-7 Units under the skin into the appropriate area as directed 4 (Four) Times a Day With Meals & at Bedtime.  12    • losartan (COZAAR) 100 MG tablet Take 1 tablet by mouth Daily. 30 tablet 0    • melatonin 3 MG tablet Take 1 tablet by mouth Every Night.      • metFORMIN (GLUCOPHAGE) 1000 MG tablet Take 1 tablet by mouth 2 (Two)  Times a Day With Meals.      • OLANZapine (zyPREXA) 2.5 MG tablet Take 24 tablets by mouth 2 (Two) Times a Day. 60 tablet 1    • potassium chloride (KLOR-CON) 20 MEQ packet Take 20 mEq by mouth Daily With Breakfast.           Hospital medications:    insulin lispro 0-9 Units Subcutaneous Q4H       pantoprazole 8 mg/hr Last Rate: 8 mg/hr (04/02/20 0954)     •  acetaminophen **OR** acetaminophen  •  dextrose  •  dextrose  •  glucagon (human recombinant)  •  ipratropium-albuterol  •  magic mouthwash  •  ondansetron **OR** ondansetron      Objective:  Vitals Ranges:   Temp:  [98 °F (36.7 °C)-98.4 °F (36.9 °C)] 98.4 °F (36.9 °C)  Heart Rate:  [67-73] 73  Resp:  [16-18] 16  BP: (158-188)/(70-95) 185/82      Physical Exam:  Very lethargic still resist eye opening with a symmetrical grimace I did not get a look at her eyes.  Some spontaneous movement greater in the left upper extremity compared to the right right side does appear to be slightly spastic.  Reflexes trace toes downgoing    Results review:   I reviewed the patient's new clinical results.    Data review:  Lab Results (last 24 hours)     Procedure Component Value Units Date/Time    POC Glucose Once [[id number]]  (Abnormal) Collected:  04/02/20 0746    Specimen:  Blood Updated:  04/02/20 0747     Glucose 141 mg/dL     Comprehensive Metabolic Panel [[id number]]  (Abnormal) Collected:  04/02/20 0534    Specimen:  Blood Updated:  04/02/20 0628     Glucose 156 mg/dL       mg/dL      Creatinine 5.65 mg/dL      Sodium 147 mmol/L      Potassium 3.2 mmol/L      Chloride 102 mmol/L      CO2 25.7 mmol/L      Calcium 7.6 mg/dL      Total Protein 5.0 g/dL      Albumin 2.30 g/dL      ALT (SGPT) 55 U/L      AST (SGOT) 95 U/L      Alkaline Phosphatase 420 U/L      Total Bilirubin 4.1 mg/dL      eGFR Non African Amer 7 mL/min/1.73      Comment: <15 Indicative of kidney failure.        eGFR   Amer --     Comment: <15 Indicative of kidney failure.        Globulin 2.7  gm/dL      A/G Ratio 0.9 g/dL      BUN/Creatinine Ratio 21.6     Anion Gap 19.3 mmol/L     Narrative:       GFR Normal >60  Chronic Kidney Disease <60  Kidney Failure <15      Magnesium [[id number]]  (Normal) Collected:  04/02/20 0534    Specimen:  Blood Updated:  04/02/20 0620     Magnesium 2.1 mg/dL     POC Glucose Once [[id number]]  (Abnormal) Collected:  04/02/20 0608    Specimen:  Blood Updated:  04/02/20 0610     Glucose 151 mg/dL     Protime-INR [[id number]]  (Abnormal) Collected:  04/02/20 0534    Specimen:  Blood Updated:  04/02/20 0602     Protime 19.6 Seconds      INR 1.69    CBC & Differential [[id number]] Collected:  04/02/20 0534    Specimen:  Blood Updated:  04/02/20 0551    Narrative:       The following orders were created for panel order CBC & Differential.  Procedure                               Abnormality         Status                     ---------                               -----------         ------                     CBC Auto Differential[[id number]]        Abnormal            Final result                 Please view results for these tests on the individual orders.    CBC Auto Differential [[id number]]  (Abnormal) Collected:  04/02/20 0534    Specimen:  Blood Updated:  04/02/20 0551     WBC 13.49 10*3/mm3      RBC 2.89 10*6/mm3      Hemoglobin 8.1 g/dL      Hematocrit 24.2 %      MCV 83.7 fL      MCH 28.0 pg      MCHC 33.5 g/dL      RDW 16.3 %      RDW-SD 49.9 fl      MPV 12.3 fL      Platelets 86 10*3/mm3      Neutrophil % 89.5 %      Lymphocyte % 4.3 %      Monocyte % 5.0 %      Eosinophil % 0.1 %      Basophil % 0.1 %      Immature Grans % 1.0 %      Neutrophils, Absolute 12.08 10*3/mm3      Lymphocytes, Absolute 0.58 10*3/mm3      Monocytes, Absolute 0.67 10*3/mm3      Eosinophils, Absolute 0.01 10*3/mm3      Basophils, Absolute 0.01 10*3/mm3      Immature Grans, Absolute 0.14 10*3/mm3      nRBC 0.0 /100 WBC     POC Glucose Once [[id number]]  (Abnormal) Collected:  04/02/20 050     Specimen:  Blood Updated:  04/02/20 0508     Glucose 157 mg/dL     POC Glucose Once [[id number]]  (Abnormal) Collected:  04/02/20 0125    Specimen:  Blood Updated:  04/02/20 0129     Glucose 131 mg/dL     POC Glucose Once [[id number]]  (Abnormal) Collected:  04/01/20 2012    Specimen:  Blood Updated:  04/01/20 2013     Glucose 162 mg/dL     POC Glucose Once [[id number]]  (Abnormal) Collected:  04/01/20 1635    Specimen:  Blood Updated:  04/01/20 1637     Glucose 144 mg/dL     Blood Culture - Blood, Arm, Left [[id number]] Collected:  03/30/20 1614    Specimen:  Blood from Arm, Left Updated:  04/01/20 1630     Blood Culture No growth at 2 days    Blood Culture - Blood, Arm, Left [[id number]] Collected:  03/30/20 1439    Specimen:  Blood from Arm, Left Updated:  04/01/20 1445     Blood Culture No growth at 2 days           Imaging:  Imaging Results (Last 24 Hours)     Procedure Component Value Units Date/Time    XR Chest Post CVA Port [[id number]] Collected:  04/01/20 1752     Updated:  04/01/20 1759    Narrative:       Portable chest radiograph     HISTORY:Line placement     TECHNIQUE: Single AP portable radiograph of the chest     COMPARISON:Chest radiograph 03/27/2020       Impression:       FINDINGS AND IMPRESSION:  Bilateral internal jugular catheters are present and terminates within  the right atrium. The left internal jugular catheter is new since  03/27/2020.     There appears to be retrocardiac pulmonary opacification which is  increased since 03/27/2020 present and concerning for atelectasis versus  early pneumonia in the appropriate clinical context and correlation with  patient history is recommended. No pneumothorax is seen. The previously  seen small pleural effusions within the bilateral lungs on CT abdomen  and pelvis are not well visualized. The heart is mildly enlarged.     This report was finalized on 4/1/2020 5:56 PM by Dr. Anthony Starks M.D.       CT Head Without Contrast [[id number]] Collected:   03/31/20 1419     Updated:  04/01/20 1431    Narrative:       CT HEAD WITHOUT CONTRAST     HISTORY:  Altered mental status.     COMPARISON: CT head 08/11/2019.     FINDINGS: A remote infarct is appreciated involving the left parietal  lobe posteriorly and the temporal occipital region inferior laterally  present previously.     There is decreased attenuation involving the insular cortex on the left  and operculum worrisome for an acute left MCA distribution infarct. The  area of decreased attenuation is somewhat ill-defined but measures  approximately 4.3 cm in AP dimension. There is no evidence of  hemorrhage. Further evaluation with MRI examination of the brain with  and without contrast is recommended. The above information was  immediately called to the patient's nurse at the time of dictation. The  patient's nurse is to immediately relay the information to the clinical  service.           Radiation dose reduction techniques were utilized, including automated  exposure control and exposure modulation based on body size.     This report was finalized on 4/1/2020 2:28 PM by Dr. Jonathan Garcia M.D.                Assessment and Plan:       Upper GI bleed    Hyperlipidemia    Hypertension    Uremic encephalopathy    Acute kidney injury (CMS/HCC)    Acute pancreatitis    Hemorrhagic shock (CMS/HCC)    Thrombocytopenia (CMS/HCC)    Type 2 diabetes mellitus with hyperglycemia (CMS/HCC)    Dementia without behavioral disturbance (CMS/HCC)    Acute posthemorrhagic anemia    She has so much lethargy and metabolic encephalopathy it is hard to comment on what her language function might be if she were more alert.  She does have spasticity and weakness on the right side compared to the left and it is hard to know if this is absolutely new or not.  CT scan noted suggest new stroke.  She has had a GI bleed and hemorrhagic shock as well she has worsening renal function is getting dialysis at this time.  Obviously we are holding  the Eliquis and aspirin that she had been taking and I would note that the carotid Dopplers are normal on the right and a normal stent on the left.  Recall this patient has had a recent left hemisphere stroke which clearly would have affected her speech function as well.  . Simply put, I do not have anything that I can treat neurologically at this time.  At this point I will sign off as neurology and follow-up on a PRN reconsult basis thank you      Ajit Bennett MD  04/02/20  13:15

## 2020-04-02 NOTE — PLAN OF CARE
Pt is nonverbal, opened eyes for a few minutes, and able to squeeze my hands, continues on protonix drip, vitals stable and pt is resting after first session of dialysis  today, ij and dialysis catheter in place, moderate output from hernandez catheter, turned q 2 hours, bilateral wrist restraints in place due to confusion, to avoid pulling lines, mouth/lips continue to bleed, and clot, suctioned and cleaned prn, will ctm.      Problem: Restraint, Nonbehavioral (Nonviolent)  Goal: Rationale and Justification  Outcome: Ongoing (interventions implemented as appropriate)     Problem: Renal Failure/Kidney Injury, Acute (Adult)  Goal: Signs and Symptoms of Listed Potential Problems Will be Absent, Minimized or Managed (Renal Failure/Kidney Injury, Acute)  Outcome: Ongoing (interventions implemented as appropriate)

## 2020-04-02 NOTE — PLAN OF CARE
Patient   remain  in  restraint.   Has  been  Lethargic     but  easily  aroused.  Edema  to  Bilateral  arms  getting  worse.   Oral   bleeding    getting  worse,  oral    care  done.     Continue IV  Protonix  drip.         T&R  q  2 hrs.   [place]  catheter  with    mustard  color   urine.    SR  on  monitor.  Nursing  will  continue to monitor.  Problem: Restraint, Nonbehavioral (Nonviolent)  Goal: Rationale and Justification  Outcome: Ongoing (interventions implemented as appropriate)  Flowsheets (Taken 3/30/2020 0638)  Rationale and Justification: failure of less restrictive safety measures;prevent harm to self  Goal: Nonbehavioral (Nonviolent) Restraint: Absence of Injury/Harm  Outcome: Ongoing (interventions implemented as appropriate)  Flowsheets (Taken 4/2/2020 0658)  Nonbehavioral (Nonviolent) Restraint: Absence of Injury/Harm: met  Goal: Nonbehavioral (Nonviolent) Restraint: Achievement of Discontinuation Criteria  Outcome: Ongoing (interventions implemented as appropriate)  Flowsheets (Taken 4/2/2020 0658)  Nonbehavioral (Nonviolent) Restraint: Achievement of Discontinuation Criteria: not met  Goal: Nonbehavioral (Nonviolent) Restraint: Preservation of Dignity and Wellbeing  Outcome: Ongoing (interventions implemented as appropriate)  Flowsheets (Taken 3/30/2020 0638)  Nonbehavioral (Nonviolent) Restraint: Preservation of Dignity and Wellbeing: met     Problem: Renal Failure/Kidney Injury, Acute (Adult)  Goal: Signs and Symptoms of Listed Potential Problems Will be Absent, Minimized or Managed (Renal Failure/Kidney Injury, Acute)  Outcome: Ongoing (interventions implemented as appropriate)  Flowsheets  Taken 4/2/2020 0658  Problems Assessed (Acute Renal Failure/Kidney Injury): all  Taken 3/31/2020 0640  Problems Present (ARF/Kidney Injury): hematologic complications;situational response     Problem: Gastrointestinal Bleeding (Adult)  Goal: Signs and Symptoms of Listed Potential Problems Will be Absent,  Minimized or Managed (Gastrointestinal Bleeding)  Outcome: Ongoing (interventions implemented as appropriate)  Flowsheets (Taken 4/2/2020 0658)  Problems Assessed (GI Bleeding): all  Problems Present (GI Bleeding): hemorrhage     Problem: Skin Injury Risk (Adult)  Goal: Skin Health and Integrity  Outcome: Ongoing (interventions implemented as appropriate)  Flowsheets (Taken 4/2/2020 0658)  Skin Health and Integrity: making progress toward outcome     Problem: Patient Care Overview  Goal: Plan of Care Review  Outcome: Ongoing (interventions implemented as appropriate)  Flowsheets (Taken 4/2/2020 0658)  Progress: declining  Plan of Care Reviewed With: patient  Goal: Interprofessional Rounds/Family Conf  Outcome: Ongoing (interventions implemented as appropriate)  Flowsheets (Taken 3/30/2020 0638)  Participants: nursing;patient

## 2020-04-02 NOTE — CONSULTS
Patient Name:  Darby Workman  YOB: 1945  MRN:  [id number]  Date of Admission:  3/27/2020  Date of Consult:  4/1/2020  Patient Care Team:  Eric Matta MD as PCP - General (General Practice)    Inpatient Internal Medicine Consult  Consult performed by: Rupert Velazco MD  Consult ordered by: Jennifer Bryan MD  Reason for consult: Evaluate status and make recommendations regarding treatment for Mental status change          Subjective   History of Present Illness  Ms. Workman is a 75 y.o. female that has been admitted to [facility] due to acute GI bleeding with hemorrhagic shock.  She has been admitted by the critical care service and and we were asked to see and assist with her medical problems.      Past Medical History:   Diagnosis Date   • Acute cerebrovascular accident (CVA) of cerebellum (CMS/[facility])    • Acute ischemic stroke (CMS/[facility])    • Arthritis    • Coronary artery disease    • CVA (cerebral vascular accident) (CMS/HCC)    • Diabetes mellitus (CMS/[facility])    • Heart attack (CMS/[facility])    • History of blood clots    • Hyperlipidemia    • Hypertension    • Vision loss      Past Surgical History:   Procedure Laterality Date   • CAROTID ENDARTERECTOMY Left 7/17/2019    Procedure: Left carotid endarterectomy;  Surgeon: Rupert Oliva MD;  Location: [facility] MAIN OR;  Service: Neurosurgery   • EMBOLECTOMY Left 7/17/2019    Procedure: CEREBRAL ANGIOGRAM, LEFT INTERNAL CAROTID ARTERY STENT;  Surgeon: Rupert Oliva MD;  Location: [facility] HYBRID OR 18/19;  Service: Neurosurgery   • ENDOSCOPY N/A 3/28/2020    Procedure: ESOPHAGOGASTRODUODENOSCOPY AT BEDSIDE WITH EPI INJECTION AND GOLD PROBE CAUTERIZATION;  Surgeon: Malathi Bright MD;  Location: [facility] ENDOSCOPY;  Service: Gastroenterology;  Laterality: N/A;  PRE GI BLEED  POST EROSIVE GASTRITIS, DUODENAL ULCER WITH CLOT     Family History   Problem Relation Age of Onset   • Arthritis Mother    • Heart disease Father    • Breast  cancer Neg Hx    • Malig Hyperthermia Neg Hx      Social History     Tobacco Use   • Smoking status: Never Smoker   • Smokeless tobacco: Never Used   Substance Use Topics   • Alcohol use: No     Frequency: Never   • Drug use: No     Medications Prior to Admission   Medication Sig Dispense Refill Last Dose   • acetaminophen (TYLENOL) 325 MG tablet Take 650 mg by mouth Every 6 (Six) Hours As Needed for Mild Pain .      • apixaban (ELIQUIS) 5 MG tablet tablet Take 1 tablet by mouth Every 12 (Twelve) Hours. 60 tablet  7/14/2019   • aspirin 325 MG tablet Take 1 tablet by mouth Daily.      • atorvastatin (LIPITOR) 80 MG tablet Take 1 tablet by mouth Every Night.      • brimonidine (ALPHAGAN) 0.2 % ophthalmic solution Administer 1 drop to both eyes 2 (Two) Times a Day.   7/17/2019 at Unknown time   • clopidogrel (PLAVIX) 75 MG tablet Take 1 tablet by mouth Daily. 30 tablet 0    • dorzolamide (TRUSOPT) 2 % ophthalmic solution Administer 1 drop to both eyes 2 (Two) Times a Day.   7/16/2019 at Unknown time   • ferrous sulfate 325 (65 FE) MG tablet Take 325 mg by mouth Daily With Breakfast.      • gabapentin (NEURONTIN) 100 MG capsule Take 1 capsule by mouth 2 (Two) Times a Day. 60 capsule 1    • glipiZIDE (GLUCOTROL) 5 MG tablet Take 1 tablet by mouth 2 (Two) Times a Day Before Meals.      • LORazepam (ATIVAN) 0.5 MG tablet Take 1 tablet by mouth 2 (Two) Times a Day. 60 tablet 0    • losartan 50 MG tablet 100 mg, hydrochlorothiazide 12.5 MG capsule 12.5 mg Take 1 dose by mouth Daily.      • naproxen (NAPROSYN) 500 MG tablet Take 500 mg by mouth 2 (Two) Times a Day With Meals.      • nebivolol (BYSTOLIC) 20 MG tablet Take 1 tablet by mouth 2 (Two) Times a Day.      • nitroglycerin (NITROSTAT) 0.4 MG SL tablet Place 1 tablet under the tongue Every 5 (Five) Minutes As Needed for Chest Pain (Systolic BP Greater Than 100). Max 3 doses in 15 minutes.  12    • pantoprazole (PROTONIX) 40 MG EC tablet Take 1 tablet by mouth 2 (Two)  Times a Day Before Meals. (Patient taking differently: Take 40 mg by mouth Daily. Indications: Gastroesophageal Reflux Disease)      • PARoxetine (PAXIL) 10 MG tablet Take 1 tablet by mouth Daily. 30 tablet 0    • vitamin D (ERGOCALCIFEROL) 1.25 MG (93967 UT) capsule capsule Take 50,000 Units by mouth 1 (One) Time Per Week.      • aluminum-magnesium hydroxide-simethicone (MAALOX MAX) 400-400-40 MG/5ML suspension Take 15 mL by mouth Every 6 (Six) Hours As Needed for Indigestion or Heartburn.      • amLODIPine (NORVASC) 5 MG tablet Take 1 tablet by mouth 2 (Two) Times a Day.      • dextromethorphan-quinidine (NUEDEXTA) 20-10 MG capsule capsule Take 1 capsule by mouth 2 (Two) Times a Day. 60 capsule 1    • hydrALAZINE (APRESOLINE) 50 MG tablet Take 1 tablet by mouth Every 8 (Eight) Hours.      • insulin lispro (humaLOG) 100 UNIT/ML injection Inject 0-7 Units under the skin into the appropriate area as directed 4 (Four) Times a Day With Meals & at Bedtime.  12    • losartan (COZAAR) 100 MG tablet Take 1 tablet by mouth Daily. 30 tablet 0    • melatonin 3 MG tablet Take 1 tablet by mouth Every Night.      • metFORMIN (GLUCOPHAGE) 1000 MG tablet Take 1 tablet by mouth 2 (Two) Times a Day With Meals.      • OLANZapine (zyPREXA) 2.5 MG tablet Take 24 tablets by mouth 2 (Two) Times a Day. 60 tablet 1    • potassium chloride (KLOR-CON) 20 MEQ packet Take 20 mEq by mouth Daily With Breakfast.        Allergies:  Patient has no known allergies.    Review of Systems   Unable to perform ROS: Acuity of condition       Objective      Vital Signs  Temp:  [98 °F (36.7 °C)-98.7 °F (37.1 °C)] 98 °F (36.7 °C)  Heart Rate:  [68-73] 70  Resp:  [16-18] 18  BP: (158-186)/(70-86) 169/86  Body mass index is 24.08 kg/m².    Physical Exam   Constitutional: She appears well-developed and well-nourished. She has a sickly appearance. She appears ill. No distress.   HENT:   Head: Normocephalic and atraumatic.   Mouth/Throat: Mucous membranes are  dry. Oral lesions present.   Dried blood throughout the mouth and lips   Eyes: Conjunctivae and EOM are normal. No scleral icterus.   Neck:   Dialysis catheter left IJ   Cardiovascular: Normal rate and regular rhythm.   Pulmonary/Chest: Effort normal. She has decreased breath sounds.   Abdominal: Soft. Bowel sounds are normal. She exhibits no distension. There is no tenderness.   Musculoskeletal: Normal range of motion. She exhibits no edema.   Neurological: She is unresponsive. GCS eye subscore is 1. GCS verbal subscore is 2. GCS motor subscore is 4.   Skin: Skin is warm and dry.   Psychiatric: She is withdrawn.   Could not be tested   Nursing note and vitals reviewed.      Results Review:   I reviewed the patient's new clinical results.  I reviewed the patient's new imaging results and agree with the interpretation.  I reviewed the patient's other test results and agree with the interpretation  I personally viewed and interpreted the patient's EKG/Telemetry data         Assessment/Plan     Active Hospital Problems    Diagnosis POA   • **Upper GI bleed [K92.2] Yes   • Uremic encephalopathy [G93.41, N19] Yes   • Acute kidney injury (CMS/HCC) [N17.9] Yes   • Acute pancreatitis [K85.90] Yes   • Hemorrhagic shock (CMS/HCC) [R57.8] Yes   • Thrombocytopenia (CMS/HCC) [D69.6] Yes   • Type 2 diabetes mellitus with hyperglycemia (CMS/HCC) [E11.65] Yes   • Sepsis (CMS/HCC) [A41.9] Yes   • Dementia without behavioral disturbance (CMS/HCC) [F03.90] Yes   • Acute posthemorrhagic anemia [D62] Yes   • Hypertension [I10] Yes   • Hyperlipidemia [E78.5] Yes       Patient has a plethora of multiple medical issues more than 1 of which is life-threatening.  She just had a dialysis catheter placed and family has chosen to pursue hemodialysis.  Will continue to monitor her clinical course with aggressive supportive care but if she does not show any significant improvement in her mental status or overall condition with dialysis will need  to further discuss palliative care with family members.  For now they wish to proceed with aggressive measures.  Will continue to monitor renal function, daily CBC, blood sugar control etc.  Patient has no acute pulmonary issues and is now outside of intensive care.  Will take over as attending.       Rupert Velazco MD  [facility]  04/01/20  23:49

## 2020-04-03 LAB
ABO GROUP BLD: NORMAL
ALBUMIN SERPL-MCNC: 2.3 G/DL (ref 3.5–5.2)
ALBUMIN/GLOB SERPL: 0.8 G/DL
ALP SERPL-CCNC: 421 U/L (ref 39–117)
ALT SERPL W P-5'-P-CCNC: 50 U/L (ref 1–33)
ANION GAP SERPL CALCULATED.3IONS-SCNC: 19.9 MMOL/L (ref 5–15)
AST SERPL-CCNC: 96 U/L (ref 1–32)
BASOPHILS # BLD AUTO: 0.01 10*3/MM3 (ref 0–0.2)
BASOPHILS NFR BLD AUTO: 0.1 % (ref 0–1.5)
BILIRUB SERPL-MCNC: 3 MG/DL (ref 0.2–1.2)
BLD GP AB SCN SERPL QL: NEGATIVE
BUN BLD-MCNC: 84 MG/DL (ref 8–23)
BUN/CREAT SERPL: 23 (ref 7–25)
CALCIUM SPEC-SCNC: 8.1 MG/DL (ref 8.6–10.5)
CHLORIDE SERPL-SCNC: 101 MMOL/L (ref 98–107)
CO2 SERPL-SCNC: 24.1 MMOL/L (ref 22–29)
CREAT BLD-MCNC: 3.65 MG/DL (ref 0.57–1)
DEPRECATED RDW RBC AUTO: 47.6 FL (ref 37–54)
EOSINOPHIL # BLD AUTO: 0.04 10*3/MM3 (ref 0–0.4)
EOSINOPHIL NFR BLD AUTO: 0.4 % (ref 0.3–6.2)
ERYTHROCYTE [DISTWIDTH] IN BLOOD BY AUTOMATED COUNT: 15.6 % (ref 12.3–15.4)
GFR SERPL CREATININE-BSD FRML MDRD: 12 ML/MIN/1.73
GFR SERPL CREATININE-BSD FRML MDRD: ABNORMAL ML/MIN/{1.73_M2}
GLOBULIN UR ELPH-MCNC: 2.8 GM/DL
GLUCOSE BLD-MCNC: 154 MG/DL (ref 65–99)
GLUCOSE BLDC GLUCOMTR-MCNC: 126 MG/DL (ref 70–130)
GLUCOSE BLDC GLUCOMTR-MCNC: 142 MG/DL (ref 70–130)
GLUCOSE BLDC GLUCOMTR-MCNC: 151 MG/DL (ref 70–130)
GLUCOSE BLDC GLUCOMTR-MCNC: 152 MG/DL (ref 70–130)
GLUCOSE BLDC GLUCOMTR-MCNC: 155 MG/DL (ref 70–130)
HCT VFR BLD AUTO: 22.4 % (ref 34–46.6)
HGB BLD-MCNC: 7.5 G/DL (ref 12–15.9)
IMM GRANULOCYTES # BLD AUTO: 0.1 10*3/MM3 (ref 0–0.05)
IMM GRANULOCYTES NFR BLD AUTO: 0.9 % (ref 0–0.5)
INR PPP: 1.68 (ref 0.9–1.1)
LYMPHOCYTES # BLD AUTO: 0.85 10*3/MM3 (ref 0.7–3.1)
LYMPHOCYTES NFR BLD AUTO: 7.7 % (ref 19.6–45.3)
MCH RBC QN AUTO: 28 PG (ref 26.6–33)
MCHC RBC AUTO-ENTMCNC: 33.5 G/DL (ref 31.5–35.7)
MCV RBC AUTO: 83.6 FL (ref 79–97)
MONOCYTES # BLD AUTO: 0.62 10*3/MM3 (ref 0.1–0.9)
MONOCYTES NFR BLD AUTO: 5.6 % (ref 5–12)
NEUTROPHILS # BLD AUTO: 9.46 10*3/MM3 (ref 1.7–7)
NEUTROPHILS NFR BLD AUTO: 85.3 % (ref 42.7–76)
NRBC BLD AUTO-RTO: 0 /100 WBC (ref 0–0.2)
PHOSPHATE SERPL-MCNC: 4.9 MG/DL (ref 2.5–4.5)
PLATELET # BLD AUTO: 88 10*3/MM3 (ref 140–450)
PMV BLD AUTO: 11.4 FL (ref 6–12)
POTASSIUM BLD-SCNC: 3.6 MMOL/L (ref 3.5–5.2)
PROT SERPL-MCNC: 5.1 G/DL (ref 6–8.5)
PROTHROMBIN TIME: 19.5 SECONDS (ref 11.7–14.2)
RBC # BLD AUTO: 2.68 10*6/MM3 (ref 3.77–5.28)
RH BLD: POSITIVE
SODIUM BLD-SCNC: 145 MMOL/L (ref 136–145)
T&S EXPIRATION DATE: NORMAL
WBC NRBC COR # BLD: 11.08 10*3/MM3 (ref 3.4–10.8)

## 2020-04-03 PROCEDURE — 86850 RBC ANTIBODY SCREEN: CPT | Performed by: INTERNAL MEDICINE

## 2020-04-03 PROCEDURE — 63710000001 INSULIN LISPRO (HUMAN) PER 5 UNITS: Performed by: INTERNAL MEDICINE

## 2020-04-03 PROCEDURE — 86900 BLOOD TYPING SEROLOGIC ABO: CPT

## 2020-04-03 PROCEDURE — 80053 COMPREHEN METABOLIC PANEL: CPT | Performed by: INTERNAL MEDICINE

## 2020-04-03 PROCEDURE — 86901 BLOOD TYPING SEROLOGIC RH(D): CPT | Performed by: INTERNAL MEDICINE

## 2020-04-03 PROCEDURE — 99233 SBSQ HOSP IP/OBS HIGH 50: CPT | Performed by: INTERNAL MEDICINE

## 2020-04-03 PROCEDURE — P9016 RBC LEUKOCYTES REDUCED: HCPCS

## 2020-04-03 PROCEDURE — 82962 GLUCOSE BLOOD TEST: CPT

## 2020-04-03 PROCEDURE — 5A1D70Z PERFORMANCE OF URINARY FILTRATION, INTERMITTENT, LESS THAN 6 HOURS PER DAY: ICD-10-PCS | Performed by: INTERNAL MEDICINE

## 2020-04-03 PROCEDURE — 85025 COMPLETE CBC W/AUTO DIFF WBC: CPT | Performed by: INTERNAL MEDICINE

## 2020-04-03 PROCEDURE — P9047 ALBUMIN (HUMAN), 25%, 50ML: HCPCS | Performed by: INTERNAL MEDICINE

## 2020-04-03 PROCEDURE — 25010000002 ALBUMIN HUMAN 25% PER 50 ML: Performed by: INTERNAL MEDICINE

## 2020-04-03 PROCEDURE — 86923 COMPATIBILITY TEST ELECTRIC: CPT

## 2020-04-03 PROCEDURE — 86900 BLOOD TYPING SEROLOGIC ABO: CPT | Performed by: INTERNAL MEDICINE

## 2020-04-03 PROCEDURE — 84100 ASSAY OF PHOSPHORUS: CPT | Performed by: INTERNAL MEDICINE

## 2020-04-03 PROCEDURE — 85610 PROTHROMBIN TIME: CPT | Performed by: INTERNAL MEDICINE

## 2020-04-03 RX ORDER — ALBUMIN (HUMAN) 12.5 G/50ML
12.5 SOLUTION INTRAVENOUS ONCE
Status: COMPLETED | OUTPATIENT
Start: 2020-04-03 | End: 2020-04-03

## 2020-04-03 RX ADMIN — INSULIN LISPRO 2 UNITS: 100 INJECTION, SOLUTION INTRAVENOUS; SUBCUTANEOUS at 21:03

## 2020-04-03 RX ADMIN — METOPROLOL TARTRATE 5 MG: 5 INJECTION, SOLUTION INTRAVENOUS at 01:37

## 2020-04-03 RX ADMIN — SODIUM CHLORIDE 8 MG/HR: 900 INJECTION INTRAVENOUS at 17:56

## 2020-04-03 RX ADMIN — METOPROLOL TARTRATE 5 MG: 5 INJECTION, SOLUTION INTRAVENOUS at 09:41

## 2020-04-03 RX ADMIN — SODIUM CHLORIDE 8 MG/HR: 900 INJECTION INTRAVENOUS at 13:51

## 2020-04-03 RX ADMIN — SODIUM CHLORIDE 8 MG/HR: 900 INJECTION INTRAVENOUS at 07:41

## 2020-04-03 RX ADMIN — INSULIN LISPRO 2 UNITS: 100 INJECTION, SOLUTION INTRAVENOUS; SUBCUTANEOUS at 05:20

## 2020-04-03 RX ADMIN — SODIUM CHLORIDE 5 MG/HR: 900 INJECTION, SOLUTION INTRAVENOUS at 13:51

## 2020-04-03 RX ADMIN — SODIUM CHLORIDE 8 MG/HR: 900 INJECTION INTRAVENOUS at 22:37

## 2020-04-03 RX ADMIN — ALBUMIN HUMAN 12.5 G: 0.25 SOLUTION INTRAVENOUS at 10:39

## 2020-04-03 RX ADMIN — SODIUM CHLORIDE 8 MG/HR: 900 INJECTION INTRAVENOUS at 01:37

## 2020-04-03 NOTE — NURSING NOTE
Spoke with Janie CHILEL regarding patient's lab results and continued elevated -150s. Latest /73. No new orders at this time. Considering patient's overall clinical status was told to call back if patient's HR is sustaining in the 140s to reevaluate need for cardiology consult or possible Cardizem. At this time patient is resting, no obvious signs of distress. Will continue to monitor closely.

## 2020-04-03 NOTE — CONSULTS
" responded to nurse request for family support and spiritual care.    I spoke w pt spouse, Ajit, on the phone. He spoke about their juan f and belief in miracles, which he still hopes for. He shared many stories about pt and said \"she has always been a fighter, and we just want to honor that. We want to give her every chance we can and try to do what we think she would want.\" He stated that he is aware of the difficult decisions he is faced w regarding her care, but that he is not ready to give up until all options have been pursued. He also named his deep grief that he is unable to see her in person, stating that it makes all of this much harder. He asked for prayer and mentioned that their  has been a source of support through this time as well.    Will follow-up again as needed.  "

## 2020-04-03 NOTE — PLAN OF CARE
Problem: Fall Risk (Adult)  Goal: Absence of Fall  Outcome: Ongoing (interventions implemented as appropriate)  Flowsheets (Taken 4/3/2020 0459)  Absence of Fall: making progress toward outcome     Problem: Restraint, Nonbehavioral (Nonviolent)  Goal: Rationale and Justification  Outcome: Ongoing (interventions implemented as appropriate)  Flowsheets (Taken 4/3/2020 0459)  Rationale and Justification: prevent line/tube removal     Problem: Renal Failure/Kidney Injury, Acute (Adult)  Goal: Signs and Symptoms of Listed Potential Problems Will be Absent, Minimized or Managed (Renal Failure/Kidney Injury, Acute)  Outcome: Ongoing (interventions implemented as appropriate)  Flowsheets  Taken 4/3/2020 0459 by Nuvia Lucero RN  Problems Assessed (Acute Renal Failure/Kidney Injury): all  Taken 3/31/2020 0640 by Carmela Muro RN  Problems Present (ARF/Kidney Injury): hematologic complications;situational response     Problem: Pain, Acute (Adult)  Goal: Acceptable Pain Control/Comfort Level  Outcome: Ongoing (interventions implemented as appropriate)  Flowsheets (Taken 4/3/2020 0459)  Acceptable Pain Control/Comfort Level: making progress toward outcome     Problem: Gastrointestinal Bleeding (Adult)  Goal: Signs and Symptoms of Listed Potential Problems Will be Absent, Minimized or Managed (Gastrointestinal Bleeding)  Outcome: Ongoing (interventions implemented as appropriate)  Flowsheets (Taken 4/3/2020 0459)  Problems Assessed (GI Bleeding): all  Problems Present (GI Bleeding): hemorrhage     Problem: Skin Injury Risk (Adult)  Goal: Skin Health and Integrity  Outcome: Ongoing (interventions implemented as appropriate)  Flowsheets (Taken 4/3/2020 0459)  Skin Health and Integrity: making progress toward outcome     Problem: Patient Care Overview  Goal: Plan of Care Review  4/3/2020 0459 by Nuvia Lucero, RN  Outcome: Ongoing (interventions implemented as appropriate)  Flowsheets  Taken 4/3/2020  0459  Progress: declining  Plan of Care Reviewed With: patient  Taken 4/3/2020 0451  Outcome Summary: Patient arouses to voice. Only opens eyes, sometimes can follow command to squeeze hands but that is it. Lips, tongue, and oral mucosa still bleeding. Frequent oral care provided. Edema to BUE and BLE. UE>LE. [place] and IJ in place. Plans for dialysis again today. BP and SpO2 stable. Converted to afib. HR has been elevated most of the night 130s-150s. LHA aware. See noted. One time dose of IV metoprolol given. Turned q2h. IV protonix continued. Palliative care may attempt to use Ipad to call family later today to discuss options, at this time full code. Will continue to monitor closely.

## 2020-04-03 NOTE — PROGRESS NOTES
Continued Stay Note  [facility]     Patient Name: Darby Workman  MRN: [id number]  Today's Date: 4/3/2020    Admit Date: 3/27/2020    Discharge Plan     Row Name 04/03/20 1110       Plan    Plan  [facility] vs palliative    Plan Comments  Notes reviewed.  Plans for palliative to talk with family.  Spoke with [facility] and they can accept back at any time.  Packet in patient chart.  Will need ambulance at [place]. Loren Sanford RN        Discharge Codes    No documentation.             Loren Sanford RN

## 2020-04-03 NOTE — PROGRESS NOTES
[facility] HOSPITALIST    PROGRESS NOTE    Name:  Darby Workman   Age:  75 y.o.  Sex:  female  :  1945  MRN:  [id number]   Visit Number:  [id number]  Admission Date:  3/27/2020  Date Of Service:  20  Primary Care Physician:  Eric Matat MD     LOS: 7 days :  Patient Care Team:  Eric Matta MD as PCP - General (General Practice):    History taken from:     chart    Chief Complaint:      Tachycardia    Subjective     Interval History:     Patient seen and examined again today.    Patient was admitted on 3/27/2020 from a nursing home as she had hematochezia.  She has history of CVA of the cerebellum, coronary artery disease, diabetes mellitus type 2, MI, hyperlipidemia, hypertension.  She was in hemorrhagic shock and admitted to the ICU.  She was noted to have coagulopathy due to Eliquis.  K Centra reversal was given.  She was placed on Protonix drip, transfused.  Her hemoglobin currently is 8.1.  She appears to have lost a system organ failure with elevated liver enzymes, and worsening creatinine.  Gastroenterology as well as nephrology were consulted.  EGD done by gastroenterology did show duodenal bulb ulcer and gastritis.  Patient continues on Protonix drip.    Nephrology has been consulted.  Patient is noted to have uremia as well as renal failure.  She was started on hemodialysis.  She continues to have metabolic encephalopathy.  She is having hemodialysis again today.    Her liver enzymes continue to be elevated with low platelet count and increased INR.  She has oral bleeding as well.  She was initially on antibiotics, these have been discontinued.  Infectious disease was consulted and agreed with this.  They did not feel her confusion was due to infectious cause.    Neurology was consulted.  Patient has significant metabolic encephalopathy on top of organic brain syndrome and did not feel that she had a new CVA.  Patient has significant parkinsonism with rigidity.  CT  did not show any abnormality.  They recommended no further neurological work-up.  Carotid Doppler showed it normal on the right and normal stent on the left.  They noted that the patient had a recent left hemisphere stroke which was clear that would have affected her speech function.  They have signed off.    Pulmonology continues to follow.  They feel that the patient has poor prognosis.  Awaiting to see if dialysis will improve her mental status.  We are transfusing PRBCs at this point.    Palliative care was consulted due to extremely poor prognosis with multisystem organ failure.  Patient continues to be encephalopathic.   was offered video visit with the patient and declined.   still wants the patient to be full code at this point.  Patient care continues to follow and did speak to him today.    Her prognosis for recovery is very small.  Again, multiple physicians have spoken to the  regarding prognosis in regards to treatment and CODE STATUS and have not been successful at changing 's opinion to pursue full treatment.  Patient is high risk for continued deterioration.    She is noted to be in A. fib with RVR since last night.  Initially Lopressor was started, however this has not improved her atrial fibrillation.  She is being transfused with the hope that this will decrease for tachycardia.  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  Will consult cardiology at this point and place patient on Cardizem drip.  Reviewed echocardiogram from 1 year ago which showed preserved ejection fraction.    Review of Systems:     Unable to obtain due to metabolic encephalopathy    Objective     Vital Signs:    Temp:  [97.6 °F (36.4 °C)-98.6 °F (37 °C)] 98.3 °F (36.8 °C)  Heart Rate:  [] 134  Resp:  [16-20] 16  BP: (107-170)/() 147/102    Physical Exam:    General: Alert and oriented x0, no acute distress,  does not respond to any stimulation.  ENT: Noted to be bleeding from the mouth.    Heart: Increased rate, irregular rhythm without murmur rub or thrill  Lungs: Clear to auscultation bilaterally without use of accessory muscles respiration  Abdomen: Soft/nontender/nondistended.  No hepatosplenomegaly noted.  MSK: Not moving any extremities       Results Review:      I reviewed the patient's new clinical results.    Labs:    Lab Results (last 24 hours)     Procedure Component Value Units Date/Time    Phosphorus [[id number]]  (Abnormal) Collected:  04/03/20 0517    Specimen:  Blood Updated:  04/03/20 1008     Phosphorus 4.9 mg/dL     Comprehensive Metabolic Panel [[id number]]  (Abnormal) Collected:  04/03/20 0517    Specimen:  Blood Updated:  04/03/20 0600     Glucose 154 mg/dL      BUN 84 mg/dL      Creatinine 3.65 mg/dL      Sodium 145 mmol/L      Potassium 3.6 mmol/L      Chloride 101 mmol/L      CO2 24.1 mmol/L      Calcium 8.1 mg/dL      Total Protein 5.1 g/dL      Albumin 2.30 g/dL      ALT (SGPT) 50 U/L      AST (SGOT) 96 U/L      Alkaline Phosphatase 421 U/L      Total Bilirubin 3.0 mg/dL      eGFR Non African Amer 12 mL/min/1.73      Comment: <15 Indicative of kidney failure.        eGFR   Amer --     Comment: <15 Indicative of kidney failure.        Globulin 2.8 gm/dL      A/G Ratio 0.8 g/dL      BUN/Creatinine Ratio 23.0     Anion Gap 19.9 mmol/L     Narrative:       GFR Normal >60  Chronic Kidney Disease <60  Kidney Failure <15      Protime-INR [[id number]]  (Abnormal) Collected:  04/03/20 0517    Specimen:  Blood Updated:  04/03/20 0550     Protime 19.5 Seconds      INR 1.68    CBC & Differential [[id number]] Collected:  04/03/20 0517    Specimen:  Blood Updated:  04/03/20 0539    Narrative:       The following orders were created for panel order CBC & Differential.  Procedure                               Abnormality         Status                     ---------                               -----------          ------                     CBC Auto Differential[[id number]]        Abnormal            Final result                 Please view results for these tests on the individual orders.    CBC Auto Differential [[id number]]  (Abnormal) Collected:  04/03/20 0517    Specimen:  Blood Updated:  04/03/20 0539     WBC 11.08 10*3/mm3      RBC 2.68 10*6/mm3      Hemoglobin 7.5 g/dL      Hematocrit 22.4 %      MCV 83.6 fL      MCH 28.0 pg      MCHC 33.5 g/dL      RDW 15.6 %      RDW-SD 47.6 fl      MPV 11.4 fL      Platelets 88 10*3/mm3      Neutrophil % 85.3 %      Lymphocyte % 7.7 %      Monocyte % 5.6 %      Eosinophil % 0.4 %      Basophil % 0.1 %      Immature Grans % 0.9 %      Neutrophils, Absolute 9.46 10*3/mm3      Lymphocytes, Absolute 0.85 10*3/mm3      Monocytes, Absolute 0.62 10*3/mm3      Eosinophils, Absolute 0.04 10*3/mm3      Basophils, Absolute 0.01 10*3/mm3      Immature Grans, Absolute 0.10 10*3/mm3      nRBC 0.0 /100 WBC     POC Glucose Once [[id number]]  (Abnormal) Collected:  04/03/20 0514    Specimen:  Blood Updated:  04/03/20 0517     Glucose 155 mg/dL     POC Glucose Once [[id number]]  (Abnormal) Collected:  04/03/20 0417    Specimen:  Blood Updated:  04/03/20 0418     Glucose 151 mg/dL     POC Glucose Once [[id number]]  (Abnormal) Collected:  04/03/20 0003    Specimen:  Blood Updated:  04/03/20 0006     Glucose 142 mg/dL     Comprehensive Metabolic Panel [[id number]]  (Abnormal) Collected:  04/02/20 2304    Specimen:  Blood Updated:  04/02/20 2343     Glucose 133 mg/dL      BUN 81 mg/dL      Creatinine 3.75 mg/dL      Sodium 144 mmol/L      Potassium 3.5 mmol/L      Chloride 101 mmol/L      CO2 24.0 mmol/L      Calcium 7.9 mg/dL      Total Protein 4.9 g/dL      Albumin 2.20 g/dL      ALT (SGPT) 51 U/L      AST (SGOT) 96 U/L      Alkaline Phosphatase 414 U/L      Total Bilirubin 3.2 mg/dL      eGFR Non African Amer 12 mL/min/1.73      Comment: <15 Indicative of kidney failure.        eGFR   Amer  --     Comment: <15 Indicative of kidney failure.        Globulin 2.7 gm/dL      A/G Ratio 0.8 g/dL      BUN/Creatinine Ratio 21.6     Anion Gap 19.0 mmol/L     Narrative:       GFR Normal >60  Chronic Kidney Disease <60  Kidney Failure <15      Troponin [[id number]]  (Normal) Collected:  04/02/20 2304    Specimen:  Blood Updated:  04/02/20 2343     Troponin T 0.023 ng/mL     Narrative:       Troponin T Reference Range:  <= 0.03 ng/mL-   Negative for AMI  >0.03 ng/mL-     Abnormal for myocardial necrosis.  Clinicians would have to utilize clinical acumen, EKG, Troponin and serial changes to determine if it is an Acute Myocardial Infarction or myocardial injury due to an underlying chronic condition.       Results may be falsely decreased if patient taking Biotin.      CBC & Differential [[id number]] Collected:  04/02/20 2304    Specimen:  Blood Updated:  04/02/20 2326    Narrative:       The following orders were created for panel order CBC & Differential.  Procedure                               Abnormality         Status                     ---------                               -----------         ------                     CBC Auto Differential[[id number]]        Abnormal            Final result                 Please view results for these tests on the individual orders.    CBC Auto Differential [[id number]]  (Abnormal) Collected:  04/02/20 2304    Specimen:  Blood Updated:  04/02/20 2326     WBC 12.27 10*3/mm3      RBC 2.69 10*6/mm3      Hemoglobin 7.7 g/dL      Hematocrit 23.3 %      MCV 86.6 fL      MCH 28.6 pg      MCHC 33.0 g/dL      RDW 16.0 %      RDW-SD 50.8 fl      MPV 13.0 fL      Platelets 84 10*3/mm3      Neutrophil % 88.2 %      Lymphocyte % 6.2 %      Monocyte % 4.6 %      Eosinophil % 0.2 %      Basophil % 0.2 %      Immature Grans % 0.6 %      Neutrophils, Absolute 10.83 10*3/mm3      Lymphocytes, Absolute 0.76 10*3/mm3      Monocytes, Absolute 0.56 10*3/mm3      Eosinophils, Absolute 0.03 10*3/mm3       Basophils, Absolute 0.02 10*3/mm3      Immature Grans, Absolute 0.07 10*3/mm3      nRBC 0.0 /100 WBC     POC Glucose Once [[id number]]  (Normal) Collected:  04/02/20 2109    Specimen:  Blood Updated:  04/02/20 2111     Glucose 130 mg/dL     POC Glucose Once [[id number]]  (Abnormal) Collected:  04/02/20 1656    Specimen:  Blood Updated:  04/02/20 1657     Glucose 135 mg/dL     Blood Culture - Blood, Arm, Left [[id number]] Collected:  03/30/20 1614    Specimen:  Blood from Arm, Left Updated:  04/02/20 1630     Blood Culture No growth at 3 days    Blood Culture - Blood, Arm, Left [[id number]] Collected:  03/30/20 1439    Specimen:  Blood from Arm, Left Updated:  04/02/20 1445     Blood Culture No growth at 3 days    POC Glucose Once [[id number]]  (Normal) Collected:  04/02/20 1414    Specimen:  Blood Updated:  04/02/20 1415     Glucose 111 mg/dL            Radiology:    Imaging Results (Last 24 Hours)     ** No results found for the last 24 hours. **          Medication Review:       insulin lispro 0-9 Units Subcutaneous Q4H   metoprolol tartrate 5 mg Intravenous Q6H         dilTIAZem 5-15 mg/hr    pantoprazole 8 mg/hr Last Rate: 8 mg/hr (04/03/20 0741)       Assessment/Plan     Problem List Items Addressed This Visit        Digestive    * (Principal) Upper GI bleed - Primary    Relevant Orders    Case Request (Completed)      Other Visit Diagnoses     Acute renal failure, unspecified acute renal failure type (CMS/[facility])        Elevated LFTs        Hyperkalemia              1.  Upper GI bleed with EGD showing duodenal bulb ulcer and erosive gastritis on 3/28/2020.  2.  Oral mucosa bleeding  3.  Acute blood loss anemia  4.  Metabolic encephalopathy with history of dementia, unclear baseline.  Uremia contributing.  5.  Previous anticoagulation with Eliquis as well as on Plavix which have been held.  6.  Acute renal failure with unknown baseline.  Patient on hemodialysis with associated uremia.  7.  Diabetes mellitus type  2  8.  Essential hypertension  9.  Coagulopathy, suspect due to liver failure.  10.  Liver failure   11.  Atrial fibrillation with RVR.  Unable to be anticoagulated.  12.  Previous embolic CVA for which she was on Plavix and Eliquis.  13.  Multisystem organ failure with renal and hepatic failure.    Plan:    Continue with hemodialysis at the family's request.  Patient will be transfused 1 unit of PRBCs today.   Palliative care is following as the patient has extremely poor prognosis.  She has multiple organ systems failing including her kidneys and her liver.  She also has a history of dementia and previous CVA.  Would recommend hospice for this patient.  Family has been resistant.   again today reiterated that the patient is full code as noted in palliative care note.     Gastroenterology has signed off as they recommend no further intervention at this point.  Neurology has nothing more to offer and has signed off.  Patient continues on Protonix drip.  Nephrology is continuing hemodialysis, but they do recommend palliative care as well.  Patient is off antibiotics at this point at the recommendation of infectious disease, continue to monitor for infection.  Pulmonology is following at this point as well.      We will consult cardiology as the patient now has atrial fibrillation with RVR.  Lopressor IV was attempted without success.  Patient will be placed on Cardizem drip.  Unable to be anticoagulated.  As stated, has been request full treatment of this patient.     Patient continues to be full code despite multiple discussions with physicians.  Further recommendations will depend on the clinical course.  Patient is high risk for deterioration and cardiac arrest.    Roni Gutierrez,   04/03/20  13:04

## 2020-04-03 NOTE — PLAN OF CARE
Pt had dialysis today, no fluid taken off.  REceived 1 unit PRBC during Dialysis.  HR cont to rise and Met IV given x 1 while in Dialysis.  Once pt returned back to unit, Cardizem drip started at 5mg.  HR stayed in 120-130's and then right after 1800, HR started to drop into the 6-'s Cardizem stopped for observation.  BP still elevated.  Last check 195/80 at 1820.  @nd unit of PRBC's almost finished.  Spoke to  and daughter twice today.  They are still considering DNr or Palliative but want to give her until Monday to make sure she is not getting better.  Restraints renewed today.  Will cont to monitor.    Problem: Fall Risk (Adult)  Goal: Absence of Fall  Outcome: Ongoing (interventions implemented as appropriate)     Problem: Restraint, Nonbehavioral (Nonviolent)  Goal: Rationale and Justification  Outcome: Ongoing (interventions implemented as appropriate)  Goal: Nonbehavioral (Nonviolent) Restraint: Absence of Injury/Harm  Outcome: Ongoing (interventions implemented as appropriate)  Goal: Nonbehavioral (Nonviolent) Restraint: Achievement of Discontinuation Criteria  Outcome: Ongoing (interventions implemented as appropriate)  Goal: Nonbehavioral (Nonviolent) Restraint: Preservation of Dignity and Wellbeing  Outcome: Ongoing (interventions implemented as appropriate)     Problem: Renal Failure/Kidney Injury, Acute (Adult)  Goal: Signs and Symptoms of Listed Potential Problems Will be Absent, Minimized or Managed (Renal Failure/Kidney Injury, Acute)  Outcome: Ongoing (interventions implemented as appropriate)     Problem: Pain, Acute (Adult)  Goal: Acceptable Pain Control/Comfort Level  Outcome: Ongoing (interventions implemented as appropriate)     Problem: Gastrointestinal Bleeding (Adult)  Goal: Signs and Symptoms of Listed Potential Problems Will be Absent, Minimized or Managed (Gastrointestinal Bleeding)  Outcome: Ongoing (interventions implemented as appropriate)     Problem: Skin Injury Risk  (Adult)  Goal: Skin Health and Integrity  Outcome: Ongoing (interventions implemented as appropriate)     Problem: Patient Care Overview  Goal: Plan of Care Review  Outcome: Ongoing (interventions implemented as appropriate)  Goal: Individualization and Mutuality  Outcome: Ongoing (interventions implemented as appropriate)  Goal: Discharge Needs Assessment  Outcome: Ongoing (interventions implemented as appropriate)  Goal: Interprofessional Rounds/Family Conf  Outcome: Ongoing (interventions implemented as appropriate)

## 2020-04-03 NOTE — PROGRESS NOTES
Received multiple calls overnight about the patient having an increased heart rate, initially I was told that patient was in A. fib with heart rate 120 and 140, EKG showed sinus tachycardia, but not clear P waves per the nurse patient was asymptomatic, however patient has minimally responsive and according to notes has a overall poor prognosis.  According to the nurse palliative care nurse is supposed to try to video chat with family tomorrow to try to transition the patient into palliative care only.  Patient blood pressure was 110/70 at the time of call from nurse, nurse also stated that patient had bilateral pitting edema, he just had her first dialysis today.  Did not want to give patient any further fluids due to fluid overload.  Given asymptomatic picture at this time, and overall poor prognosis documented by several doctors, instructed the nurse to just monitor patient and call back if heart rate sustained greater than 140.  Not sure there is much of a benefit to consult cardiology, given the plan to try to transition to palliative care tomorrow.

## 2020-04-03 NOTE — CONSULTS
Purpose of Visit: Spoke to spouse, Ajit, regarding goals of care. Discussed palliative care options and verified code status. Per Ajit, it is his wish to continue aggressive treatment at this time. He would like his wife to continue as a FULL CODE at this time. He stated that she is a fighter and he doesn't want to give up on her yet. I discussed at length what a Full Code means, he is aware and wants to continue current plan of care for a few more days. Offer to facetime with spouse was declined at this time.     Assessment: Patient meets criteria for palliative care due to KIERAN, Dementia, Anemia, Shock, GI Bleed, DM, CVA.     Recommendation/Plan: Recommend Palliative care as an option, however the family denies our services at this time.

## 2020-04-03 NOTE — NURSING NOTE
Call placed to Janie CHILEL to notify of change in patient's heart rate and rhythm and to evaluate EKG. Patient converted from sinus rhythm to what appears to be afib. EKG showed sinus tach with irregular rate. Labs ordered stat,collected, and sent to lab. Will continue to monitor and call back with results and patient update.

## 2020-04-03 NOTE — PROGRESS NOTES
Adult Nutrition  Assessment/PES    Patient Name:  Darby Workman  YOB: 1945  MRN: [id number]  Admit Date:  3/27/2020    Assessment Date:  4/3/2020    Comments:  Nutrition follow up.  Remains NPO, has now been NPO x 7 days.    All MDs are recommending palliative care.  Family is resistant to this and not wanting to pursue it at this time.  Remains full code.    Recommend starting alternate means of nutrition via DHT placed by nursing staff.    RD to continue to follow closely.    Available at [phone] as needed.    Reason for Assessment     Row Name 04/03/20 1420          Reason for Assessment    Reason For Assessment  follow-up protocol         Nutrition/Diet History     Row Name 04/03/20 1420          Nutrition/Diet History    Typical Food/Fluid Intake  NPO x 7 days         Anthropometrics     Row Name 04/03/20 1420          Admit Weight    Admit Weight  -- 137# 4/3        Body Mass Index (BMI)    BMI Assessment  BMI 18.5-24.9: normal         Labs/Tests/Procedures/Meds     Row Name 04/03/20 1421          Labs/Procedures/Meds    Lab Results Reviewed  reviewed, pertinent     Lab Results Comments  Gluc, BUN, Creat, Platelets, Phos, ALT, Alb        Diagnostic Tests/Procedures    Diagnostic Test/Procedure Reviewed  reviewed, pertinent     Diagnostic Test/Procedures Comments  HD initiated yesterday        Medications    Pertinent Medications Reviewed  reviewed, pertinent     Pertinent Medications Comments  insulin, protonix drip, cardizem drip         Physical Findings     Row Name 04/03/20 1422          Physical Findings    Oral/Mouth Cavity  bleeding bloody drainage from mouth     Skin  -- B=12, bruised           Nutrition Prescription Ordered     Row Name 04/03/20 1423          Nutrition Prescription PO    Current PO Diet  NPO;Regular x 7 days                 Problem/Interventions:          Intervention Goal     Row Name 04/03/20 1423          Intervention Goal    General  Maintain  nutrition;Disease management/therapy;Reduce/improve symptoms;Meet nutritional needs for age/condition     PO  Initiate feeding     Weight  Maintain weight         Nutrition Intervention     Row Name 04/03/20 1424          Nutrition Intervention    RD/Tech Action  Follow Tx progress;Care plan reviewd;Await begin PO           Education/Evaluation     Row Name 04/03/20 1424          Education    Education  Will Instruct as appropriate        Monitor/Evaluation    Monitor  Per protocol;I&O;Pertinent labs;Weight;Symptoms;Skin status     Education Follow-up  Reinforce PRN           Electronically signed by:  Marialuisa Smith RD  04/03/20 14:25

## 2020-04-03 NOTE — PROGRESS NOTES
"[facility] PULMONARY CARE         Dr Rodriguez Sayied   LOS: 7 days   Patient Care Team:  Eric Matta MD as PCP - General (General Practice)    Chief Complaint: Status post GI bleed hemorrhagic shock oral bleeding worsening renal failure multiple medical issues as listed below.    Interval History: Confused with significant bleeding from the oral cavity.    REVIEW OF SYSTEMS:   Confused    Ventilator/Non-Invasive Ventilation Settings (From admission, onward)    None            Vital Signs  Temp:  [97.6 °F (36.4 °C)-98.6 °F (37 °C)] 98.6 °F (37 °C)  Heart Rate:  [] 121  Resp:  [16-20] 16  BP: (107-170)/(64-91) 129/77    Intake/Output Summary (Last 24 hours) at 4/3/2020 1158  Last data filed at 4/3/2020 1039  Gross per 24 hour   Intake 250 ml   Output 1736 ml   Net -1486 ml     Flowsheet Rows      First Filed Value   Admission Height  165.1 cm (65\") Documented at 03/27/2020 1029   Admission Weight  60.3 kg (133 lb) Documented at 03/27/2020 1029          Physical Exam:   General Appearance:   Confused with extensive oral bleeding noted.  Eyes scleral icterus   Lungs:    Diminished breath sounds rhonchi bilaterally on the basis    Heart:    Regular rhythm and normal rate, normal S1 and S2, no            murmur, no gallop, no rub, no click   Chest Wall:    No abnormalities observed   Abdomen:    Soft mild diffuse tenderness.  No rebound or guarding   Extremities:   Moves all extremities well, no edema, no cyanosis, no             redness  CNS confused     Results Review:        Results from last 7 days   Lab Units 04/03/20  0517 04/02/20 2304 04/02/20  0534   SODIUM mmol/L 145 144 147*   POTASSIUM mmol/L 3.6 3.5 3.2*   CHLORIDE mmol/L 101 101 102   CO2 mmol/L 24.1 24.0 25.7   BUN mg/dL 84* 81* 122*   CREATININE mg/dL 3.65* 3.75* 5.65*   GLUCOSE mg/dL 154* 133* 156*   CALCIUM mg/dL 8.1* 7.9* 7.6*     Results from last 7 days   Lab Units 04/02/20  2304   TROPONIN T ng/mL 0.023     Results from last 7 days   Lab " "Units 04/03/20  0517 04/02/20  2304 04/02/20  0534   WBC 10*3/mm3 11.08* 12.27* 13.49*   HEMOGLOBIN g/dL 7.5* 7.7* 8.1*   HEMATOCRIT % 22.4* 23.3* 24.2*   PLATELETS 10*3/mm3 88* 84* 86*     Results from last 7 days   Lab Units 04/03/20  0517 04/02/20  0534 04/01/20  0520  03/30/20  1536   INR  1.68* 1.69* 1.79*   < > 1.62*   APTT seconds  --   --   --   --  46.6*    < > = values in this interval not displayed.         Results from last 7 days   Lab Units 04/02/20  0534   MAGNESIUM mg/dL 2.1               I reviewed the patient's new clinical results.  I personally viewed and interpreted the patient's CXR        Medication Review:     insulin lispro 0-9 Units Subcutaneous Q4H   metoprolol tartrate 5 mg Intravenous Q6H         pantoprazole 8 mg/hr Last Rate: 8 mg/hr (04/03/20 0741)       ASSESSMENT:   1. Hemorrhagic shock -- better  2. Coagulopathy due to eliquis -- s/p kcentra reversal.  3. Acute blood loss anemia due to GI bleed-- protonix gtt, transfusion as needed, monitor hemoglobin EGD with DU and gastritis  4. Acute kidney injury -- iv fluids and monitor renal function, discussed with Dr. Atkins   5. Hyperkalemia --  \"  \"  6. Hyponatremia -- monitor  7. Elevated liver enzymes -- ct abd/pelvis ok, ultrasound done, gi following  8. Acute pancreatitis mild  9. Sepsis -- can't r/o --continue with rocephin,   10. Dementia not at baseline -- she is on neurotnin, ativan, and zyprexa at home, will have to hold these for now  11. DMII with hyperglycemia -- ssi --accuchecks ok  12. Hyperlipidemia -- hold statin  13. Thrombocytopenia -worsening  14. Small hiatal hernia  15. Erosive gastritis  16. 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  Encephalopathy    PLAN:  Mental status remains poor and noted input from neurology regarding possibility of subacute CVA.  Unable to anticoagulate due to current bleeding condition.  We will see if dialysis will help her mental status.  Tunnel catheter placed and dialysis initiated per " nephrology.  Remains on PPI drip with falling hemoglobin.  Not sure drop in hemoglobin due to GI source versus oral component.  Will transfuse PRBC to see if current tachycardia related to severe anemia.  ID input noted.  They have stopped antibiotics and continues to observe  Hematology input noted with improving platelet counts.  I had a detailed discussion with the patient's  who at this time wishes to pursue full code.  I explained to him poor prognosis etc.  He wishes to continue with current care for now.  Due to poor mental status she is not able to eat.  May have to consider alternative means of nutrition if mental status does not improve post dialysis.  Appreciate input from hospitalist in managing multiple medical issues.  Since patient still a full code and high risk for being transferred to the ICU we will follow along for now    Jennifer Bryan MD  04/03/20  11:58

## 2020-04-04 LAB
ALBUMIN SERPL-MCNC: 2.3 G/DL (ref 3.5–5.2)
ALBUMIN/GLOB SERPL: 0.9 G/DL
ALP SERPL-CCNC: 396 U/L (ref 39–117)
ALT SERPL W P-5'-P-CCNC: 39 U/L (ref 1–33)
ANION GAP SERPL CALCULATED.3IONS-SCNC: 14.8 MMOL/L (ref 5–15)
AST SERPL-CCNC: 71 U/L (ref 1–32)
BACTERIA SPEC AEROBE CULT: NORMAL
BACTERIA SPEC AEROBE CULT: NORMAL
BASOPHILS # BLD AUTO: 0.01 10*3/MM3 (ref 0–0.2)
BASOPHILS NFR BLD AUTO: 0.1 % (ref 0–1.5)
BH BB BLOOD EXPIRATION DATE: NORMAL
BH BB BLOOD TYPE BARCODE: 7300
BH BB DISPENSE STATUS: NORMAL
BH BB PRODUCT CODE: NORMAL
BH BB UNIT NUMBER: NORMAL
BILIRUB SERPL-MCNC: 2.6 MG/DL (ref 0.2–1.2)
BUN BLD-MCNC: 42 MG/DL (ref 8–23)
BUN/CREAT SERPL: 20.4 (ref 7–25)
CALCIUM SPEC-SCNC: 8.2 MG/DL (ref 8.6–10.5)
CHLORIDE SERPL-SCNC: 100 MMOL/L (ref 98–107)
CO2 SERPL-SCNC: 23.2 MMOL/L (ref 22–29)
CREAT BLD-MCNC: 2.06 MG/DL (ref 0.57–1)
CROSSMATCH INTERPRETATION: NORMAL
DEPRECATED RDW RBC AUTO: 46.4 FL (ref 37–54)
EOSINOPHIL # BLD AUTO: 0.01 10*3/MM3 (ref 0–0.4)
EOSINOPHIL NFR BLD AUTO: 0.1 % (ref 0.3–6.2)
ERYTHROCYTE [DISTWIDTH] IN BLOOD BY AUTOMATED COUNT: 14.5 % (ref 12.3–15.4)
GFR SERPL CREATININE-BSD FRML MDRD: 23 ML/MIN/1.73
GLOBULIN UR ELPH-MCNC: 2.6 GM/DL
GLUCOSE BLD-MCNC: 153 MG/DL (ref 65–99)
GLUCOSE BLDC GLUCOMTR-MCNC: 155 MG/DL (ref 70–130)
GLUCOSE BLDC GLUCOMTR-MCNC: 166 MG/DL (ref 70–130)
GLUCOSE BLDC GLUCOMTR-MCNC: 189 MG/DL (ref 70–130)
GLUCOSE BLDC GLUCOMTR-MCNC: 190 MG/DL (ref 70–130)
GLUCOSE BLDC GLUCOMTR-MCNC: 191 MG/DL (ref 70–130)
HCT VFR BLD AUTO: 27.6 % (ref 34–46.6)
HCT VFR BLD AUTO: 29 % (ref 34–46.6)
HGB BLD-MCNC: 9.2 G/DL (ref 12–15.9)
HGB BLD-MCNC: 9.5 G/DL (ref 12–15.9)
IMM GRANULOCYTES # BLD AUTO: 0.06 10*3/MM3 (ref 0–0.05)
IMM GRANULOCYTES NFR BLD AUTO: 0.7 % (ref 0–0.5)
LYMPHOCYTES # BLD AUTO: 0.66 10*3/MM3 (ref 0.7–3.1)
LYMPHOCYTES NFR BLD AUTO: 7.2 % (ref 19.6–45.3)
MAGNESIUM SERPL-MCNC: 1.9 MG/DL (ref 1.6–2.4)
MCH RBC QN AUTO: 28.8 PG (ref 26.6–33)
MCHC RBC AUTO-ENTMCNC: 33.3 G/DL (ref 31.5–35.7)
MCV RBC AUTO: 86.5 FL (ref 79–97)
MONOCYTES # BLD AUTO: 0.57 10*3/MM3 (ref 0.1–0.9)
MONOCYTES NFR BLD AUTO: 6.2 % (ref 5–12)
NEUTROPHILS # BLD AUTO: 7.84 10*3/MM3 (ref 1.7–7)
NEUTROPHILS NFR BLD AUTO: 85.7 % (ref 42.7–76)
NRBC BLD AUTO-RTO: 0 /100 WBC (ref 0–0.2)
PLATELET # BLD AUTO: 102 10*3/MM3 (ref 140–450)
PMV BLD AUTO: 12.8 FL (ref 6–12)
POTASSIUM BLD-SCNC: 3.7 MMOL/L (ref 3.5–5.2)
PROT SERPL-MCNC: 4.9 G/DL (ref 6–8.5)
RBC # BLD AUTO: 3.19 10*6/MM3 (ref 3.77–5.28)
SODIUM BLD-SCNC: 138 MMOL/L (ref 136–145)
UNIT  ABO: NORMAL
UNIT  RH: NORMAL
WBC NRBC COR # BLD: 9.15 10*3/MM3 (ref 3.4–10.8)

## 2020-04-04 PROCEDURE — 25010000002 HYDRALAZINE PER 20 MG: Performed by: INTERNAL MEDICINE

## 2020-04-04 PROCEDURE — 83735 ASSAY OF MAGNESIUM: CPT | Performed by: NURSE PRACTITIONER

## 2020-04-04 PROCEDURE — 93010 ELECTROCARDIOGRAM REPORT: CPT | Performed by: INTERNAL MEDICINE

## 2020-04-04 PROCEDURE — 80053 COMPREHEN METABOLIC PANEL: CPT | Performed by: INTERNAL MEDICINE

## 2020-04-04 PROCEDURE — 85018 HEMOGLOBIN: CPT | Performed by: INTERNAL MEDICINE

## 2020-04-04 PROCEDURE — 85014 HEMATOCRIT: CPT | Performed by: INTERNAL MEDICINE

## 2020-04-04 PROCEDURE — 82962 GLUCOSE BLOOD TEST: CPT

## 2020-04-04 PROCEDURE — 99222 1ST HOSP IP/OBS MODERATE 55: CPT | Performed by: INTERNAL MEDICINE

## 2020-04-04 PROCEDURE — 93005 ELECTROCARDIOGRAM TRACING: CPT | Performed by: INTERNAL MEDICINE

## 2020-04-04 PROCEDURE — 85025 COMPLETE CBC W/AUTO DIFF WBC: CPT | Performed by: INTERNAL MEDICINE

## 2020-04-04 PROCEDURE — 63710000001 INSULIN LISPRO (HUMAN) PER 5 UNITS: Performed by: INTERNAL MEDICINE

## 2020-04-04 RX ORDER — HYDRALAZINE HYDROCHLORIDE 20 MG/ML
10 INJECTION INTRAMUSCULAR; INTRAVENOUS EVERY 6 HOURS PRN
Status: DISCONTINUED | OUTPATIENT
Start: 2020-04-04 | End: 2020-04-04

## 2020-04-04 RX ORDER — HYDRALAZINE HYDROCHLORIDE 20 MG/ML
20 INJECTION INTRAMUSCULAR; INTRAVENOUS EVERY 6 HOURS PRN
Status: DISCONTINUED | OUTPATIENT
Start: 2020-04-04 | End: 2020-04-20 | Stop reason: HOSPADM

## 2020-04-04 RX ADMIN — INSULIN LISPRO 2 UNITS: 100 INJECTION, SOLUTION INTRAVENOUS; SUBCUTANEOUS at 00:41

## 2020-04-04 RX ADMIN — SODIUM CHLORIDE 8 MG/HR: 900 INJECTION INTRAVENOUS at 08:59

## 2020-04-04 RX ADMIN — INSULIN LISPRO 2 UNITS: 100 INJECTION, SOLUTION INTRAVENOUS; SUBCUTANEOUS at 23:55

## 2020-04-04 RX ADMIN — SODIUM CHLORIDE 8 MG/HR: 900 INJECTION INTRAVENOUS at 18:53

## 2020-04-04 RX ADMIN — SODIUM CHLORIDE 8 MG/HR: 900 INJECTION INTRAVENOUS at 04:05

## 2020-04-04 RX ADMIN — METOPROLOL TARTRATE 2.5 MG: 5 INJECTION, SOLUTION INTRAVENOUS at 23:31

## 2020-04-04 RX ADMIN — SODIUM CHLORIDE 8 MG/HR: 900 INJECTION INTRAVENOUS at 13:43

## 2020-04-04 RX ADMIN — HYDRALAZINE HYDROCHLORIDE 20 MG: 20 INJECTION INTRAMUSCULAR; INTRAVENOUS at 20:54

## 2020-04-04 RX ADMIN — SODIUM CHLORIDE 8 MG/HR: 900 INJECTION INTRAVENOUS at 23:37

## 2020-04-04 RX ADMIN — INSULIN LISPRO 2 UNITS: 100 INJECTION, SOLUTION INTRAVENOUS; SUBCUTANEOUS at 20:54

## 2020-04-04 RX ADMIN — HYDRALAZINE HYDROCHLORIDE 20 MG: 20 INJECTION INTRAMUSCULAR; INTRAVENOUS at 13:47

## 2020-04-04 NOTE — PROGRESS NOTES
[facility] was contacted for cardiology consult.  Patient is established with [facility].  We will defer to their group for cardiology care.    Electronically signed by GETACHEW Jasso, 04/04/20, 6:59 AM.

## 2020-04-04 NOTE — PLAN OF CARE
BP elevated and HR decreasing.  Sometimes I go in room to stimulate pt to get her HR increased.  Pt would not drink any of her clear lunch.  Pt had black, tarry blood in brief.  Protonix drip cont.  Spoke to  and daughter on phone regarding pts condition, they wanted Dr. Kapoor to call them to discuss the care of the pt.  Bleeding from mouth has decreased. Called [facility] and he said he would call them.  Will cont to monitor.      Problem: Fall Risk (Adult)  Goal: Absence of Fall  Outcome: Ongoing (interventions implemented as appropriate)     Problem: Restraint, Nonbehavioral (Nonviolent)  Goal: Rationale and Justification  Outcome: Ongoing (interventions implemented as appropriate)  Goal: Nonbehavioral (Nonviolent) Restraint: Absence of Injury/Harm  Outcome: Ongoing (interventions implemented as appropriate)  Goal: Nonbehavioral (Nonviolent) Restraint: Achievement of Discontinuation Criteria  Outcome: Ongoing (interventions implemented as appropriate)  Goal: Nonbehavioral (Nonviolent) Restraint: Preservation of Dignity and Wellbeing  Outcome: Ongoing (interventions implemented as appropriate)     Problem: Renal Failure/Kidney Injury, Acute (Adult)  Goal: Signs and Symptoms of Listed Potential Problems Will be Absent, Minimized or Managed (Renal Failure/Kidney Injury, Acute)  Outcome: Ongoing (interventions implemented as appropriate)     Problem: Pain, Acute (Adult)  Goal: Acceptable Pain Control/Comfort Level  Outcome: Ongoing (interventions implemented as appropriate)     Problem: Gastrointestinal Bleeding (Adult)  Goal: Signs and Symptoms of Listed Potential Problems Will be Absent, Minimized or Managed (Gastrointestinal Bleeding)  Outcome: Ongoing (interventions implemented as appropriate)     Problem: Skin Injury Risk (Adult)  Goal: Skin Health and Integrity  Outcome: Ongoing (interventions implemented as appropriate)     Problem: Patient Care Overview  Goal: Plan of Care Review  Outcome: Ongoing  (interventions implemented as appropriate)  Goal: Individualization and Mutuality  Outcome: Ongoing (interventions implemented as appropriate)  Goal: Discharge Needs Assessment  Outcome: Ongoing (interventions implemented as appropriate)  Goal: Interprofessional Rounds/Family Conf  Outcome: Ongoing (interventions implemented as appropriate)

## 2020-04-04 NOTE — PLAN OF CARE
Eyes open, follows movement, no attempt to verbalize, no signs/symptoms of pain, lips bleeding Vaseline applied, clamps mouth shut tight with oral care, b/p elevated,188/88, 184/75, converted to sinus rhythm, repositioned per staff, Dozing long intervals, restraints remain in use to prevent pulling of central lines and f/c

## 2020-04-04 NOTE — PROGRESS NOTES
Name: Darby Workman ADMIT: 3/27/2020   : 1945  PCP: Eric Matta MD    MRN: [id number] LOS: 8 days   AGE/SEX: 75 y.o. female  ROOM: [facility]   Subjective   Chief Complaint   Patient presents with   • Altered Mental Status   • Black or Bloody Stool      Opened eyes to name. Maintained gaze. Would not follow commands or respond to questions. Dried blood around mouth. Pupils equal. Could not test full cranial nerves.    Objective   Vital Signs  Temp:  [98.1 °F (36.7 °C)-99.1 °F (37.3 °C)] 99.1 °F (37.3 °C)  Heart Rate:  [] 69  Resp:  [16-18] 18  BP: (113-195)/() 183/83  SpO2:  [93 %-100 %] 99 %  on  Flow (L/min):  [2] 2;   Device (Oxygen Therapy): nasal cannula  Body mass index is 22.86 kg/m².    Physical Exam   Constitutional: She appears well-developed. She appears lethargic.   frail   HENT:   Head: Atraumatic.   Nose: Nose normal.   Eyes: Pupils are equal, round, and reactive to light. Conjunctivae are normal.   Neck: Neck supple. No tracheal deviation present.   Cardiovascular: Normal rate. An irregular rhythm present.   Pulmonary/Chest: Effort normal. She has decreased breath sounds. She has no wheezes.   Abdominal: Soft. She exhibits no distension. There is no tenderness.   Musculoskeletal: She exhibits no edema or tenderness.   Neurological: She appears lethargic. She is disoriented.   Skin: Skin is dry. She is not diaphoretic.   Psychiatric: Cognition and memory are impaired.   Nursing note and vitals reviewed.      Results Review:       I reviewed the patient's new clinical results.     I reviewed imaging, agree with interpretation.     I reviewed telemetry/EKG results, afib, rate ok now      Results from last 7 days   Lab Units 20  0546 20  0517 20  2304 20  0534   WBC 10*3/mm3 9.15 11.08* 12.27* 13.49*   HEMOGLOBIN g/dL 9.2* 7.5* 7.7* 8.1*   PLATELETS 10*3/mm3 102* 88* 84* 86*     Results from last 7 days   Lab Units 20  0546 20  0517  04/02/20 2304 04/02/20  0534   SODIUM mmol/L 138 145 144 147*   POTASSIUM mmol/L 3.7 3.6 3.5 3.2*   CHLORIDE mmol/L 100 101 101 102   CO2 mmol/L 23.2 24.1 24.0 25.7   BUN mg/dL 42* 84* 81* 122*   CREATININE mg/dL 2.06* 3.65* 3.75* 5.65*   GLUCOSE mg/dL 153* 154* 133* 156*   Estimated Creatinine Clearance: 22.9 mL/min (A) (by C-G formula based on SCr of 2.06 mg/dL (H)).  Results from last 7 days   Lab Units 04/04/20  0546 04/03/20  0517 04/02/20 2304 04/02/20  0534   CALCIUM mg/dL 8.2* 8.1* 7.9* 7.6*   ALBUMIN g/dL 2.30* 2.30* 2.20* 2.30*   MAGNESIUM mg/dL 1.9  --   --  2.1   PHOSPHORUS mg/dL  --  4.9*  --   --          insulin lispro 0-9 Units Subcutaneous Q4H       dilTIAZem 5-15 mg/hr Last Rate: 5 mg/hr (04/03/20 1351)   pantoprazole 8 mg/hr Last Rate: 8 mg/hr (04/04/20 0859)   No diet orders on file    Assessment/Plan      Active Hospital Problems    Diagnosis  POA   • **Upper GI bleed [K92.2]  Yes   • Uremic encephalopathy [G93.41, N19]  Yes   • Acute kidney injury (CMS/HCC) [N17.9]  Yes   • Acute pancreatitis [K85.90]  Yes   • Hemorrhagic shock (CMS/HCC) [R57.8]  Yes   • Thrombocytopenia (CMS/HCC) [D69.6]  Yes   • Type 2 diabetes mellitus with hyperglycemia (CMS/HCC) [E11.65]  Yes   • Dementia without behavioral disturbance (CMS/HCC) [F03.90]  Yes   • Acute posthemorrhagic anemia [D62]  Yes   • Hypertension [I10]  Yes   • Hyperlipidemia [E78.5]  Yes      Resolved Hospital Problems   No resolved problems to display.       · GIB/Hemorrhagic Shock: Required transfusion, kcentra. Duodenal Ulcer and erosive gastritis on EGD. Anticoagulation held. Also has oral component of blood loss as well. GI, Surgery, and Oncology evaluated. All have recommended palliative care given her poor prognosis and multiorgan failure. Palliative care discussed with her family yestderday and they wished her to remain full code. Continue to monitor hgb and can transfuse if needed. She is not anticoagulation candidate. PPI  ongoing.  · CVA: Left MCA CVA. Subacute based on CT and aphasia. Neurology evaluated. She is not AC or antiplatelet candidate at this time. She had this even while she was on Eliquis and aspirin prior to admission.  · Dementia  · Leukocytosis: Reactive in nature. No antibiotics indicated. Infectious Disease evaluated.  · AFib: Rate better on diltiazem drip. Cardiology consulted.  · TCP  · KIERAN: Dialysis intiated. Nephrology following.  · Disposition: Agree that prognosis is very poor.  Family was not interested in palliative goals of care. Monitor her progression today and readdress goals of care based on that. Return to SNF if becomes stable enough for discharge.    Chandana Kapoor MD  [facility]  04/04/20  09:54    Dictated portions using Dragon dictation software.

## 2020-04-04 NOTE — PROGRESS NOTES
"      [place] PULMONARY CARE         Dr Rodriguez Sayied   LOS: 8 days   Patient Care Team:  Eric Matta MD as PCP - General (General Practice)    Chief Complaint: Status post GI bleed hemorrhagic shock oral bleeding worsening renal failure multiple medical issues as listed below.    Interval History: More alert this morning and currently tracking me.  Still not following commands.  Bleeding from the oral cavity appears to be decreasing.    REVIEW OF SYSTEMS:   Confused    Ventilator/Non-Invasive Ventilation Settings (From admission, onward)    None            Vital Signs  Temp:  [98.2 °F (36.8 °C)-99.1 °F (37.3 °C)] 99.1 °F (37.3 °C)  Heart Rate:  [] 69  Resp:  [16-18] 18  BP: (147-195)/() 183/83    Intake/Output Summary (Last 24 hours) at 4/4/2020 1130  Last data filed at 4/4/2020 0754  Gross per 24 hour   Intake 950 ml   Output 500 ml   Net 450 ml     Flowsheet Rows      First Filed Value   Admission Height  165.1 cm (65\") Documented at 03/27/2020 1029   Admission Weight  60.3 kg (133 lb) Documented at 03/27/2020 1029          Physical Exam:   General Appearance:   More awake and tracking.  Not following commands.  Some dried blood noted around the oral cavity  Eyes scleral icterus   Lungs:    Diminished breath sounds rhonchi bilaterally on the basis    Heart:    Regular rhythm and normal rate, normal S1 and S2, no            murmur, no gallop, no rub, no click   Chest Wall:    No abnormalities observed   Abdomen:    Soft mild diffuse tenderness.  No rebound or guarding   Extremities:   Moves all extremities well, no edema, no cyanosis, no             redness  CNS confused     Results Review:        Results from last 7 days   Lab Units 04/04/20  0546 04/03/20  0517 04/02/20  2304   SODIUM mmol/L 138 145 144   POTASSIUM mmol/L 3.7 3.6 3.5   CHLORIDE mmol/L 100 101 101   CO2 mmol/L 23.2 24.1 24.0   BUN mg/dL 42* 84* 81*   CREATININE mg/dL 2.06* 3.65* 3.75*   GLUCOSE mg/dL 153* 154* 133*   CALCIUM mg/dL " "8.2* 8.1* 7.9*     Results from last 7 days   Lab Units 04/02/20  2304   TROPONIN T ng/mL 0.023     Results from last 7 days   Lab Units 04/04/20  0546 04/03/20  0517 04/02/20  2304   WBC 10*3/mm3 9.15 11.08* 12.27*   HEMOGLOBIN g/dL 9.2* 7.5* 7.7*   HEMATOCRIT % 27.6* 22.4* 23.3*   PLATELETS 10*3/mm3 102* 88* 84*     Results from last 7 days   Lab Units 04/03/20  0517 04/02/20  0534 04/01/20  0520  03/30/20  1536   INR  1.68* 1.69* 1.79*   < > 1.62*   APTT seconds  --   --   --   --  46.6*    < > = values in this interval not displayed.         Results from last 7 days   Lab Units 04/04/20  0546   MAGNESIUM mg/dL 1.9               I reviewed the patient's new clinical results.  I personally viewed and interpreted the patient's CXR        Medication Review:     insulin lispro 0-9 Units Subcutaneous Q4H   metoprolol tartrate 2.5 mg Intravenous Q12H         dilTIAZem 5-15 mg/hr Last Rate: 5 mg/hr (04/03/20 1351)   pantoprazole 8 mg/hr Last Rate: 8 mg/hr (04/04/20 0859)       ASSESSMENT:   1. Hemorrhagic shock -- better  2. Coagulopathy due to eliquis -- s/p kcentra reversal.  3. Acute blood loss anemia due to GI bleed-- protonix gtt, transfusion as needed, monitor hemoglobin EGD with DU and gastritis  4. Acute kidney injury -- iv fluids and monitor renal function, discussed with Dr. Atkins   5. Hyperkalemia --  \"  \"  6. Hyponatremia -- monitor  7. Elevated liver enzymes -- ct abd/pelvis ok, ultrasound done, gi following  8. Acute pancreatitis mild  9. Sepsis -- can't r/o --continue with rocephin,   10. Dementia not at baseline -- she is on neurotnin, ativan, and zyprexa at home, will have to hold these for now  11. DMII with hyperglycemia -- ssi --accuchecks ok  12. Hyperlipidemia -- hold statin  13. Thrombocytopenia -worsening  14. Small hiatal hernia  15. Erosive gastritis  16. 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  Encephalopathy    PLAN:  Mental status today actually some better with blood transfusion and noted input " from neurology regarding possibility of subacute CVA.  Unable to anticoagulate due to current bleeding condition.   It seems her mental status is improving some with dialysis and blood transfusion.  Tunnel catheter placed and dialysis initiated per nephrology.  Remains on PPI drip with stable hemoglobin now posttransfusion.  Continue to monitor hemoglobin closely.  ID input noted.  They have stopped antibiotics and continues to observe  Hematology input noted with improving platelet counts.  I had a detailed discussion with the patient's  who at this time wishes to pursue full code. Due to poor mental status she is not able to eat.  May have to consider alternative means of nutrition if mental status does not improve post dialysis.  Appreciate input from hospitalist in managing multiple medical issues.  Since patient still a full code and high risk for being transferred to the ICU we will follow along for now    Jennifer Bryan MD  04/04/20  11:30

## 2020-04-04 NOTE — CONSULTS
[facility]        Patient Name: Darby Workman  Age/Sex: 75 y.o. female  : 1945  MRN: [id number]    Date of Admission: 3/27/2020  Date of Encounter Visit: 20  Encounter Provider: Aditi Hook, JUDY, APRN  Referring Provider: Mustapha Mahan MD  Place of Service: [facility] CARDIOLOGY  Patient Care Team:  Eric Matta MD as PCP - General (General Practice)    Subjective:     Chief Complaint: Upper GI bleed    Reason for consult: Atrial fibrillation with RVR    History of Present Illness:  Darby Workman is a 75 y.o. female who follows with Dr. Perez in our office.  Patient was admitted 3/27/2020 with hematochezia/ upper GI bleed, acute renal failure, metabolic acidosis, hypotension, hyperkalemia, elevated LFTs.  We have been asked to evaluate for atrial fibrillation with RVR.     Patient was seen by Dr. 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  Echo showed normal LV systolic function, grade II diastolic dysfunction, and some age related changes.  DANY showed normal LV systolic function without significant valve disease, and atheroma seen in aortic arch. Renal doppler was normal. She was placed on eliquis for multi-territory cerbral infarcts. Zio patch showed short bursts of atrial tachycardia but no atrial fibrillation or VT. She was readmitted to hospital with headache and carotid doppler showed severe stenosis of left internal carotid artery with moderate stenosis of right internal carotid artery with plans to follow-up with vascular surgeon.     Patient presented to the ER 3/27/2020 from [facility] with hematochezia, altered mental status, confusion.  She was reported to be alert but pleasantly confused at baseline but in ER would only respond to painful stimuli. CBC showed hgb/ hct 10.4/32.3, WBC 12.5, RBC 3.57, positive fecal occult blood stool, elevated lipase, CMP with glucose 186, Cr 5.91, eGFR 7, sodium 133, K+  5.9, alt/ast 152/125, alkalin phosphatase 607.  CT abdomen/pelvis showed mild L2 compression deformity, small probable angiomyolipoma in right kidney, possibly small amount gallbladder sludge.  Patient was admitted to medicine team with nephrology, pulmonology, and GI consults. She was treated with IV bicarb for metabolic acidosis. KIERAN and hyponatremia were felt to be from volume depletion. Upper endoscopy showed erosive gastritis and duodenal ulcer.  She was also felt to have acute pancreatitis.  She was placed on a protonix drip and transfused. Sepsis could not be ruled out and she was treated with rocephin (infectious disease following). Hematology was consulted for leukocytosis and thrombocytopenia.  Neurology consulted and feels she has metabolic encephalopathy with possible subacute CVA. Vascular consulted for insertion of non-tunneled central venous catheter for urgent dialysis.  Pulmonology met with patient's  regarding poor prognosis who was noted to want to pursue full code but may consider DNR or palliative if no improvement by Monday, per nursing note.      HR elevated (120s-130s) on  at approximately 10 PM at which time an EKG was performed showing atrial fibrillation RVR.  Interestingly the EKG was done at faster speed but telemetry was reviewed and corroborated atrial fibrillation with rapid ventricular response. IV lopressor was attempted without improvement in HR.  Patient placed on cardizem drip and cardioverted at approximately 6 PM last night.  On interview she is in sinus rhythm heart rate approximately 70 bpm.  Patient is lethargic and will not participate in conversation or provide historical details.  All details provided herein are taken from electronic health record and discussion with nursing staff.        Prior Cardiac Testin. ZIO PATCH 19:  · An abnormal monitor study. Sinus rhythm with rare atrial and ventricular ectopic beats.  8 short atrial runs with the longest  lasting 17 beats.  No sustained atrial fibrillation.  No VT.  2. DANY 5/16/19:  · Estimated EF = 60%.  · Left ventricular systolic function is normal.  · Trace-to-mild aortic valve regurgitation is present.  · Mild tricuspid valve regurgitation is present.  · Calculated right ventricular systolic pressure from tricuspid regurgitation is 39 mmHg.  3. ECHO 5/15/19  · Calculated EF = 54%. Estimated EF was in agreement with the calculated EF. Normal left ventricular cavity size noted. All left ventricular wall segments contract normally.  · Left ventricular wall thickness is consistent with mild concentric hypertrophy.  · Left ventricular diastolic dysfunction is noted (grade II w/high LAP) consistent with pseudonormalization.  · There is moderate calcification of the aortic valve.  · Mild aortic valve regurgitation is present.  · The mitral valve is markedly abnormal. There is calcification and retracted chordae of the posterior mitral valve leaflet. There is a very large echogenic mass in the area of the posterior mitral annulus that may just represent calcification however there is a mobile echogenic structure attached to the left ventricular side of that area.  · Mild mitral valve regurgitation is present.        Past Medical History:  Past Medical History:   Diagnosis Date   • Acute cerebrovascular accident (CVA) of cerebellum (CMS/HCC)    • Acute ischemic stroke (CMS/HCC)    • Arthritis    • Coronary artery disease    • CVA (cerebral vascular accident) (CMS/HCC)    • Diabetes mellitus (CMS/HCC)    • Heart attack (CMS/HCC)    • History of blood clots    • Hyperlipidemia    • Hypertension    • Vision loss        Past Surgical History:   Procedure Laterality Date   • CAROTID ENDARTERECTOMY Left 7/17/2019    Procedure: Left carotid endarterectomy;  Surgeon: Rupert Oliva MD;  Location: [facility];  Service: Neurosurgery   • EMBOLECTOMY Left 7/17/2019    Procedure: CEREBRAL ANGIOGRAM, LEFT INTERNAL CAROTID  ARTERY STENT;  Surgeon: Rupert Oliva MD;  Location: [facility] HYBRID OR 18/19;  Service: Neurosurgery   • ENDOSCOPY N/A 3/28/2020    Procedure: ESOPHAGOGASTRODUODENOSCOPY AT BEDSIDE WITH EPI INJECTION AND GOLD PROBE CAUTERIZATION;  Surgeon: Malathi Bright MD;  Location: [facility] ENDOSCOPY;  Service: Gastroenterology;  Laterality: N/A;  PRE GI BLEED  POST EROSIVE GASTRITIS, DUODENAL ULCER WITH CLOT       Home Medications:   Medications Prior to Admission   Medication Sig Dispense Refill Last Dose   • acetaminophen (TYLENOL) 325 MG tablet Take 650 mg by mouth Every 6 (Six) Hours As Needed for Mild Pain .      • apixaban (ELIQUIS) 5 MG tablet tablet Take 1 tablet by mouth Every 12 (Twelve) Hours. 60 tablet  7/14/2019   • aspirin 325 MG tablet Take 1 tablet by mouth Daily.      • atorvastatin (LIPITOR) 80 MG tablet Take 1 tablet by mouth Every Night.      • brimonidine (ALPHAGAN) 0.2 % ophthalmic solution Administer 1 drop to both eyes 2 (Two) Times a Day.   7/17/2019 at Unknown time   • clopidogrel (PLAVIX) 75 MG tablet Take 1 tablet by mouth Daily. 30 tablet 0    • dorzolamide (TRUSOPT) 2 % ophthalmic solution Administer 1 drop to both eyes 2 (Two) Times a Day.   7/16/2019 at Unknown time   • ferrous sulfate 325 (65 FE) MG tablet Take 325 mg by mouth Daily With Breakfast.      • gabapentin (NEURONTIN) 100 MG capsule Take 1 capsule by mouth 2 (Two) Times a Day. 60 capsule 1    • glipiZIDE (GLUCOTROL) 5 MG tablet Take 1 tablet by mouth 2 (Two) Times a Day Before Meals.      • LORazepam (ATIVAN) 0.5 MG tablet Take 1 tablet by mouth 2 (Two) Times a Day. 60 tablet 0    • losartan 50 MG tablet 100 mg, hydrochlorothiazide 12.5 MG capsule 12.5 mg Take 1 dose by mouth Daily.      • naproxen (NAPROSYN) 500 MG tablet Take 500 mg by mouth 2 (Two) Times a Day With Meals.      • nebivolol (BYSTOLIC) 20 MG tablet Take 1 tablet by mouth 2 (Two) Times a Day.      • nitroglycerin (NITROSTAT) 0.4 MG SL tablet Place 1 tablet under  the tongue Every 5 (Five) Minutes As Needed for Chest Pain (Systolic BP Greater Than 100). Max 3 doses in 15 minutes.  12    • pantoprazole (PROTONIX) 40 MG EC tablet Take 1 tablet by mouth 2 (Two) Times a Day Before Meals. (Patient taking differently: Take 40 mg by mouth Daily. Indications: Gastroesophageal Reflux Disease)      • PARoxetine (PAXIL) 10 MG tablet Take 1 tablet by mouth Daily. 30 tablet 0    • vitamin D (ERGOCALCIFEROL) 1.25 MG (78814 UT) capsule capsule Take 50,000 Units by mouth 1 (One) Time Per Week.      • aluminum-magnesium hydroxide-simethicone (MAALOX MAX) 400-400-40 MG/5ML suspension Take 15 mL by mouth Every 6 (Six) Hours As Needed for Indigestion or Heartburn.      • amLODIPine (NORVASC) 5 MG tablet Take 1 tablet by mouth 2 (Two) Times a Day.      • dextromethorphan-quinidine (NUEDEXTA) 20-10 MG capsule capsule Take 1 capsule by mouth 2 (Two) Times a Day. 60 capsule 1    • hydrALAZINE (APRESOLINE) 50 MG tablet Take 1 tablet by mouth Every 8 (Eight) Hours.      • insulin lispro (humaLOG) 100 UNIT/ML injection Inject 0-7 Units under the skin into the appropriate area as directed 4 (Four) Times a Day With Meals & at Bedtime.  12    • losartan (COZAAR) 100 MG tablet Take 1 tablet by mouth Daily. 30 tablet 0    • melatonin 3 MG tablet Take 1 tablet by mouth Every Night.      • metFORMIN (GLUCOPHAGE) 1000 MG tablet Take 1 tablet by mouth 2 (Two) Times a Day With Meals.      • OLANZapine (zyPREXA) 2.5 MG tablet Take 24 tablets by mouth 2 (Two) Times a Day. 60 tablet 1    • potassium chloride (KLOR-CON) 20 MEQ packet Take 20 mEq by mouth Daily With Breakfast.          Allergies:  No Known Allergies    Past Social History:  Social History     Socioeconomic History   • Marital status:      Spouse name: Not on file   • Number of children: Not on file   • Years of education: Not on file   • Highest education level: Not on file   Occupational History   • Occupation: retired   Tobacco Use   •  Smoking status: Never Smoker   • Smokeless tobacco: Never Used   Substance and Sexual Activity   • Alcohol use: No     Frequency: Never   • Drug use: No   • Sexual activity: Defer       Past Family History:  Family History   Problem Relation Age of Onset   • Arthritis Mother    • Heart disease Father    • Breast cancer Neg Hx    • Malig Hyperthermia Neg Hx        Review of Systems   Unable to perform ROS: dementia         Objective:   Temp:  [98.1 °F (36.7 °C)-99.1 °F (37.3 °C)] 99.1 °F (37.3 °C)  Heart Rate:  [] 69  Resp:  [16-18] 18  BP: (113-195)/() 183/83     Intake/Output Summary (Last 24 hours) at 4/4/2020 0923  Last data filed at 4/4/2020 0754  Gross per 24 hour   Intake 1000 ml   Output 500 ml   Net 500 ml     Body mass index is 22.86 kg/m².      04/02/20  0536 04/03/20  0546 04/04/20  0502   Weight: 61.7 kg (136 lb) 62.3 kg (137 lb 5.6 oz) 62.3 kg (137 lb 5.6 oz)           Physical Exam   Constitutional:   Lethargic and only responds to pain   HENT:   Dried blood around the lips and the and perioral mucosa without clear visualization of active source   Eyes: Pupils are equal, round, and reactive to light. Right eye exhibits no discharge. Left eye exhibits no discharge.   Neck: No JVD present. No thyromegaly present.   Cardiovascular: Regular rhythm.   No murmur heard.  Pulmonary/Chest: No respiratory distress. She has no wheezes.   Abdominal: She exhibits no distension. There is no tenderness.   Musculoskeletal: She exhibits no edema or tenderness.   Neurological:   Responds only to painful stimuli   Skin: Skin is warm. No erythema.         Lab Review:   Results from last 7 days   Lab Units 04/04/20  0546 04/03/20  0517 04/02/20  2304 04/02/20  0534 04/01/20  0520 03/31/20  0511 03/30/20  0632   SODIUM mmol/L 138 145 144 147* 142 138 135*   POTASSIUM mmol/L 3.7 3.6 3.5 3.2* 3.8 4.1 4.4   CHLORIDE mmol/L 100 101 101 102 97* 93* 89*   CO2 mmol/L 23.2 24.1 24.0 25.7 25.6 27.1 30.6*   BUN mg/dL 42*  84* 81* 122* 134* 119* 112*   CREATININE mg/dL 2.06* 3.65* 3.75* 5.65* 6.02* 5.87* 5.75*   GLUCOSE mg/dL 153* 154* 133* 156* 168* 121* 126*   CALCIUM mg/dL 8.2* 8.1* 7.9* 7.6* 6.6* 6.8* 6.7*   AST (SGOT) U/L 71* 96* 96* 95* 86* 97* 110*   ALT (SGPT) U/L 39* 50* 51* 55* 59* 73* 80*     Results from last 7 days   Lab Units 04/02/20 2304   TROPONIN T ng/mL 0.023     Results from last 7 days   Lab Units 04/04/20  0546 04/03/20  0517 04/02/20  2304 04/02/20  0534 04/01/20  0520 03/31/20  0511 03/30/20  1428 03/30/20  0632   WBC 10*3/mm3 9.15 11.08* 12.27* 13.49* 15.83* 20.66*  --  16.28*   HEMOGLOBIN g/dL 9.2* 7.5* 7.7* 8.1* 8.8* 10.2*  --  9.8*   HEMATOCRIT % 27.6* 22.4* 23.3* 24.2* 25.8* 30.1*  --  27.9*   PLATELETS 10*3/mm3 102* 88* 84* 86* 83* 87* 45* 50*     Results from last 7 days   Lab Units 04/03/20  0517 04/02/20  0534 04/01/20  0520 03/31/20  1440 03/30/20  1536   INR  1.68* 1.69* 1.79* 1.73* 1.62*   APTT seconds  --   --   --   --  46.6*     Results from last 7 days   Lab Units 04/04/20  0546 04/02/20  0534   MAGNESIUM mg/dL 1.9 2.1           Invalid input(s): LDLCALC              EKG:   I personally viewed and interpreted the patient's EKG      Imaging:  Imaging Results (Most Recent)     Procedure Component Value Units Date/Time    XR Chest Post CVA Port [[id number]] Collected:  04/01/20 1752     Updated:  04/01/20 1759    Narrative:       Portable chest radiograph     HISTORY:Line placement     TECHNIQUE: Single AP portable radiograph of the chest     COMPARISON:Chest radiograph 03/27/2020       Impression:       FINDINGS AND IMPRESSION:  Bilateral internal jugular catheters are present and terminates within  the right atrium. The left internal jugular catheter is new since  03/27/2020.     There appears to be retrocardiac pulmonary opacification which is  increased since 03/27/2020 present and concerning for atelectasis versus  early pneumonia in the appropriate clinical context and correlation  with  patient history is recommended. No pneumothorax is seen. The previously  seen small pleural effusions within the bilateral lungs on CT abdomen  and pelvis are not well visualized. The heart is mildly enlarged.     This report was finalized on 4/1/2020 5:56 PM by Dr. Anthony Starks M.D.       CT Head Without Contrast [[id number]] Collected:  03/31/20 1419     Updated:  04/01/20 1431    Narrative:       CT HEAD WITHOUT CONTRAST     HISTORY:  Altered mental status.     COMPARISON: CT head 08/11/2019.     FINDINGS: A remote infarct is appreciated involving the left parietal  lobe posteriorly and the temporal occipital region inferior laterally  present previously.     There is decreased attenuation involving the insular cortex on the left  and operculum worrisome for an acute left MCA distribution infarct. The  area of decreased attenuation is somewhat ill-defined but measures  approximately 4.3 cm in AP dimension. There is no evidence of  hemorrhage. Further evaluation with MRI examination of the brain with  and without contrast is recommended. The above information was  immediately called to the patient's nurse at the time of dictation. The  patient's nurse is to immediately relay the information to the clinical  service.           Radiation dose reduction techniques were utilized, including automated  exposure control and exposure modulation based on body size.     This report was finalized on 4/1/2020 2:28 PM by Dr. Jonathan Garcia M.D.       CT Abdomen Pelvis Without Contrast [[id number]] Collected:  03/30/20 1556     Updated:  03/30/20 1604    Narrative:       ABDOMEN AND PELVIS CT WITHOUT CONTRAST     HISTORY: Abdominal pain. Recent GI bleed.     TECHNIQUE: CT of the abdomen and pelvis was performed without contrast  and is correlated with a noncontrast CT from 03/27/2020.     Radiation dose reduction techniques were utilized, including automated  exposure control and exposure modulation based on body size.      FINDINGS: There is diffuse third spacing and a small volume of  low-density ascites in the pelvis. Very small volume of ascites is also  observed around the liver and there are small posteriorly layering  pleural effusions at both lung bases, 2 cm on the right and about 13 mm  on the left. The visualized lung bases are clear. There is dense mitral  valve calcification. No focal hematoma is identified anywhere in the  body wall, around the pelvis, or the lower chest. There is no abnormal  appearing bowel. The appendix is in situ and appears normal. The distal  esophagus and the stomach appear normal.     The parenchyma of the liver, spleen, pancreas, adrenals, and kidneys has  a normal noncontrast CT appearance. There is no genitourinary calculus  or hydronephrosis. Gallbladder is in situ and appears normal. Extensive  atheromatous vascular calcification is observed. The aorta is normal in  caliber.     There is a mild L2 superior endplate compression deformity of unknown  age, but favored to be old. Degenerative changes are observed along the  spine.       Impression:       Extensive third spacing. There is a small volume of ascites.  No focal hematoma or abnormal appearing bowel is present. L2 superior  endplate compression fracture is favored to be old.     This report was finalized on 3/30/2020 4:01 PM by Dr. Ajit Day M.D.       US Abdomen Complete [[id number]] Collected:  03/27/20 2349     Updated:  03/27/20 2357    Narrative:       ABDOMINAL ULTRASOUND     HISTORY: Abnormal elevated liver function tests     COMPARISON: 03/27/2020     TECHNIQUE: Gray scale, color Doppler, spectral Doppler waveform analysis  was performed through the abdomen.     FINDINGS:  There is some mild dilatation of the pancreatic duct. It measures up to  3 mm. However, there is no intra or extrahepatic biliary dilatation.  Common bile duct measures up to 5 mm. No stones or sludge are seen  within the gallbladder, although there  is some gallbladder wall edema  and thickening, measuring up to 4 mm. This appearance is nonspecific,  and can be associated with a variety of etiologies, including  right-sided heart failure and hepatitis. The abdominal aorta measures  within normal size limits. It measures up to 1.7 cm proximally, 1.5 cm  within its midportion, and 2.07 m distally. Extensive calcification is  noted. The common iliac arteries measure within normal size limits.  Liver echotexture is grossly unremarkable. No focal hepatic lesions are  seen. The right kidney measures 12.0 x 5.5 x 5.6 cm. There is an  angiomyolipoma identified on the inferior pole of the right kidney. This  measures 1.1 x 1.0 x 1.0 cm. The left kidney measures 11.9 x 6.2 x 5.6  cm. It is normal in echotexture. No hydronephrosis is seen on either  side. The spleen appears unremarkable.       Impression:          1. Patient is noted to have some gallbladder wall thickening and edema.  Appearance is nonspecific, and can be associated with a variety of  etiologies. No stones or sludge are seen. Line  2. Mild pancreatic ductal dilatation, uncertain clinical significance.  There is no intra or extrahepatic biliary dilatation.  3. Small right renal angiomyolipoma.     This report was finalized on 3/27/2020 11:54 PM by Dr. Di Tong M.D.       XR Chest Post CVA Port [[id number]] Collected:  03/27/20 1811     Updated:  03/27/20 1815    Narrative:       XR CHEST POST CVA PORT-     INDICATIONS: Central line placement     TECHNIQUE: Frontal view of the chest     COMPARISON: 07/20/2019     FINDINGS:     Right IJ catheter extends to the cavoatrial junction region. The heart  is mildly enlarged. Aorta is tortuous, calcified. The pulmonary  vasculature is unremarkable. No focal pulmonary consolidation, pleural  effusion, or pneumothorax. No acute osseous process.       Impression:          Right IJ catheter. No pneumothorax. Mild cardiomegaly. Tortuous aorta.           This  report was finalized on 3/27/2020 6:12 PM by Dr. Arsalan Bedoya M.D.       CT Abdomen Pelvis Without Contrast [[id number]] Collected:  03/27/20 1323     Updated:  03/27/20 1505    Narrative:       CT OF THE ABDOMEN AND PELVIS WITHOUT CONTRAST 03/27/2020     HISTORY: Elevated liver enzymes. Possible GI bleed.     TECHNIQUE: Axial images were obtained from the lung bases to the  symphysis pubis. No intravenous or oral contrast was given.     FINDINGS: There may be a small amount of sludge in the gallbladder. No  gallstones are seen. No surrounding inflammatory changes are seen.  Gallbladder is well-distended. The liver, spleen, pancreas, adrenals and  kidneys appear unremarkable except for a fat density right renal small  nodule possibly a small angiomyolipoma. It measures approximately 10 mm.     The uterus and urinary bladder appear unremarkable. No bowel wall  thickening or bowel dilatation is seen. There is a mild compression  deformity of L2 involving depression of the superior endplate with  approximately 20% loss of the vertebral body height. This could be acute  or subacute.       Impression:       1. Mild L2 compression deformity which could be acute or subacute. No  definite spinal canal stenosis is seen.  2. Small probable angiomyolipoma in the right kidney.  3. There may be a small amount of sludge in the gallbladder. No  gallstones are seen.        Radiation dose reduction techniques were utilized, including automated  exposure control and exposure modulation based on body size.     This report was finalized on 3/27/2020 3:02 PM by Dr. Stuart Dubois M.D.             CAROTID ULTRASOUND 3/31/2020:  · Left carotid stent present with patent flow noted.  · Proximal right internal carotid artery moderate stenosis.           Assessment:       Upper GI bleed    Hyperlipidemia    Hypertension    Uremic encephalopathy    Acute kidney injury (CMS/HCC)    Acute pancreatitis    Hemorrhagic shock (CMS/HCC)     Thrombocytopenia (CMS/HCC)    Type 2 diabetes mellitus with hyperglycemia (CMS/HCC)    Dementia without behavioral disturbance (CMS/HCC)    Acute posthemorrhagic anemia        Plan:     1. Atrial fibrillation with RVR: Spontaneous cardioversion.  Patient unable to take any p.o. at this time and was on very high dose oral beta-blocker.  We will start Lopressor 2.5 mg IV twice daily.  AC on hold for obvious reasons.   2. Chronic AC therapy: eliquis and plavix on hold due to GI bleed  3. Hypertension -recommend adding hydralazine IV.  4. Upper GI Bleed & oral bleeding: EGD showed duodenal bulb ulcer and erosive gastritis.  GI following.   5. Acute blood loss anemia: due to #4  6. Metabolic encephalopathy with history of dementia: baseline mental status unclear. Neurology following.  CT head 3/31 was concerning for possible MCA stroke.   7. Previous embolic CVA: plavix and eliquis on hold, as above.  Neurology following.   8. Possible sepsis: was treated with abx which have now been stopped by ID.  Positive IGG HSV  9. Acute renal failure: nephrology following.  On dialysis with uremia.   10. Liver failure: GI following  11. Thrombocytopenia, leukocytosis: hematology following.   12. Hyperlipidemia: statins on hold due to #8  13. Type II DM      Poor prognosis with ongoing talks for goals of care with family per internal medicine and pulmonology.  Thank you for allowing me to participate in the care of Darby Workman. Feel free to contact me directly with any further questions or concerns.      Ajit Avila MD  [facility]  04/04/20  9:23 AM

## 2020-04-04 NOTE — PROGRESS NOTES
"   LOS: 8 days    Patient Care Team:  Eric Matta MD as PCP - General (General Practice)    Chief Complaint:    Chief Complaint   Patient presents with   • Altered Mental Status   • Black or Bloody Stool     Follow UP KIERAN  Subjective     Interval History:     Seen and examined. Lethargic but aroused. Laying in bed. Had dialysis yesterday. Opens her eyes more today  Review of Systems:   Unable to obtain.     Objective     Vital Signs  Temp:  [98.2 °F (36.8 °C)-99.1 °F (37.3 °C)] 99.1 °F (37.3 °C)  Heart Rate:  [] 69  Resp:  [16-18] 18  BP: (129-195)/() 183/83    Flowsheet Rows      First Filed Value   Admission Height  165.1 cm (65\") Documented at 03/27/2020 1029   Admission Weight  60.3 kg (133 lb) Documented at 03/27/2020 1029          I/O this shift:  In: -   Out: 500 [Urine:500]  I/O last 3 completed shifts:  In: 1200 [Blood:950; IV Piggyback:250]  Out: 400 [Urine:400]    Intake/Output Summary (Last 24 hours) at 4/4/2020 1050  Last data filed at 4/4/2020 0754  Gross per 24 hour   Intake 950 ml   Output 500 ml   Net 450 ml       Physical Exam:  Elderly wf.  Oral mucosa with signif dried and oozing blood.    Neck no jvd  Heart RRR No s3 or rub  Lungs clear to auscultation, no wheezing  Abd Distended, slightly tender, no guarding or rebound  Body wall edema  Ext 1+ upper and lower ext edema.       Results Review:    Results from last 7 days   Lab Units 04/04/20  0546 04/03/20  0517 04/02/20  2304   SODIUM mmol/L 138 145 144   POTASSIUM mmol/L 3.7 3.6 3.5   CHLORIDE mmol/L 100 101 101   CO2 mmol/L 23.2 24.1 24.0   BUN mg/dL 42* 84* 81*   CREATININE mg/dL 2.06* 3.65* 3.75*   CALCIUM mg/dL 8.2* 8.1* 7.9*   BILIRUBIN mg/dL 2.6* 3.0* 3.2*   ALK PHOS U/L 396* 421* 414*   ALT (SGPT) U/L 39* 50* 51*   AST (SGOT) U/L 71* 96* 96*   GLUCOSE mg/dL 153* 154* 133*       Estimated Creatinine Clearance: 22.9 mL/min (A) (by C-G formula based on SCr of 2.06 mg/dL (H)).    Results from last 7 days   Lab Units " 04/04/20  0546 04/03/20  0517 04/02/20  0534   MAGNESIUM mg/dL 1.9  --  2.1   PHOSPHORUS mg/dL  --  4.9*  --              Results from last 7 days   Lab Units 04/04/20  0546 04/03/20  0517 04/02/20  2304 04/02/20  0534 04/01/20  0520   WBC 10*3/mm3 9.15 11.08* 12.27* 13.49* 15.83*   HEMOGLOBIN g/dL 9.2* 7.5* 7.7* 8.1* 8.8*   PLATELETS 10*3/mm3 102* 88* 84* 86* 83*       Results from last 7 days   Lab Units 04/03/20  0517 04/02/20  0534 04/01/20  0520 03/31/20  1440 03/30/20  1536   INR  1.68* 1.69* 1.79* 1.73* 1.62*         Imaging Results (Last 24 Hours)     ** No results found for the last 24 hours. **          insulin lispro 0-9 Units Subcutaneous Q4H       dilTIAZem 5-15 mg/hr Last Rate: 5 mg/hr (04/03/20 1351)   pantoprazole 8 mg/hr Last Rate: 8 mg/hr (04/04/20 0859)       Medication Review:   Current Facility-Administered Medications   Medication Dose Route Frequency Provider Last Rate Last Dose   • acetaminophen (TYLENOL) tablet 650 mg  650 mg Oral Q4H PRN Boogie Saavedra MD        Or   • acetaminophen (TYLENOL) suppository 650 mg  650 mg Rectal Q4H PRN Boogie Saavedra MD       • dextrose (D50W) 25 g/ 50mL Intravenous Solution 25 g  25 g Intravenous Q15 Min PRN Boogie Saavedra MD   25 g at 03/30/20 0033   • dextrose (GLUTOSE) oral gel 15 g  15 g Oral Q15 Min PRN Boogie Saavedra MD       • dilTIAZem (CARDIZEM) 100 mg in 100 mL NS (1 mg/mL) infusion  5-15 mg/hr Intravenous Titrated Roni Gutierrez DO 5 mL/hr at 04/03/20 1351 5 mg/hr at 04/03/20 1351   • glucagon (human recombinant) (GLUCAGEN DIAGNOSTIC) injection 1 mg  1 mg Subcutaneous Q15 Min PRN Boogie Saavedra MD       • insulin lispro (humaLOG) injection 0-9 Units  0-9 Units Subcutaneous Q4H Boogie Saavedra MD   2 Units at 04/04/20 0041   • ipratropium-albuterol (DUO-NEB) nebulizer solution 3 mL  3 mL Nebulization Q2H PRN Boogie Saavedra MD       • magic mouthwash oral supsension 15 mL  15 mL Swish & Spit Q4H PRN Mario Saavedra MD   15  mL at 04/01/20 2022   • metoprolol tartrate (LOPRESSOR) injection 5 mg  5 mg Intravenous Q6H PRN Roni Gutierrez, DO       • ondansetron (ZOFRAN) tablet 4 mg  4 mg Oral Q6H PRN Boogie Saavedra MD        Or   • ondansetron (ZOFRAN) injection 4 mg  4 mg Intravenous Q6H PRN Boogie Saavedra MD       • pantoprazole (PROTONIX) 40 mg/100 mL (0.4 mg/mL) in 0.9% NS IVPB  8 mg/hr Intravenous Continuous Boogie Saavedra MD 20 mL/hr at 04/04/20 0859 8 mg/hr at 04/04/20 0859       Assessment/Plan   1. KIERAN with baseline unknown after 9/19 when renal function was normal.    2. Oral mucosal bleeding without clear source.   3. Coagulopathy.   4. TCP.   5. UGI bleeding with duodenal ulcer, gastritis.  Hg stable.   6. Elevated liver enzymes with distended gallbladder  7. Leukocytosis  8. Positive IGG HSV.   9. Dementia with superimposed metabolic encephalopathy.    Plan:  Plan for HD at AM  Prognosis remain poor. Palliative care is appropriate   wishes to continue aggressive treatment at this time  Surveillance labs      Vish Rodriguez MD  04/04/20  10:50

## 2020-04-05 LAB
ALBUMIN SERPL-MCNC: 2.4 G/DL (ref 3.5–5.2)
ALBUMIN/GLOB SERPL: 1 G/DL
ALP SERPL-CCNC: 379 U/L (ref 39–117)
ALT SERPL W P-5'-P-CCNC: 36 U/L (ref 1–33)
ANION GAP SERPL CALCULATED.3IONS-SCNC: 14.7 MMOL/L (ref 5–15)
APTT PPP: 37.5 SECONDS (ref 22.7–35.4)
AST SERPL-CCNC: 51 U/L (ref 1–32)
BASOPHILS # BLD AUTO: 0.01 10*3/MM3 (ref 0–0.2)
BASOPHILS NFR BLD AUTO: 0.1 % (ref 0–1.5)
BILIRUB SERPL-MCNC: 2.3 MG/DL (ref 0.2–1.2)
BUN BLD-MCNC: 51 MG/DL (ref 8–23)
BUN/CREAT SERPL: 24.2 (ref 7–25)
CALCIUM SPEC-SCNC: 8.3 MG/DL (ref 8.6–10.5)
CHLORIDE SERPL-SCNC: 103 MMOL/L (ref 98–107)
CO2 SERPL-SCNC: 24.3 MMOL/L (ref 22–29)
CREAT BLD-MCNC: 2.11 MG/DL (ref 0.57–1)
DEPRECATED RDW RBC AUTO: 46 FL (ref 37–54)
EOSINOPHIL # BLD AUTO: 0.05 10*3/MM3 (ref 0–0.4)
EOSINOPHIL NFR BLD AUTO: 0.6 % (ref 0.3–6.2)
ERYTHROCYTE [DISTWIDTH] IN BLOOD BY AUTOMATED COUNT: 14.4 % (ref 12.3–15.4)
GFR SERPL CREATININE-BSD FRML MDRD: 23 ML/MIN/1.73
GLOBULIN UR ELPH-MCNC: 2.5 GM/DL
GLUCOSE BLD-MCNC: 167 MG/DL (ref 65–99)
GLUCOSE BLDC GLUCOMTR-MCNC: 151 MG/DL (ref 70–130)
GLUCOSE BLDC GLUCOMTR-MCNC: 162 MG/DL (ref 70–130)
GLUCOSE BLDC GLUCOMTR-MCNC: 180 MG/DL (ref 70–130)
GLUCOSE BLDC GLUCOMTR-MCNC: 195 MG/DL (ref 70–130)
HCT VFR BLD AUTO: 28.7 % (ref 34–46.6)
HCT VFR BLD AUTO: 29.7 % (ref 34–46.6)
HCT VFR BLD AUTO: 29.9 % (ref 34–46.6)
HGB BLD-MCNC: 9.3 G/DL (ref 12–15.9)
HGB BLD-MCNC: 9.7 G/DL (ref 12–15.9)
HGB BLD-MCNC: 9.8 G/DL (ref 12–15.9)
IMM GRANULOCYTES # BLD AUTO: 0.06 10*3/MM3 (ref 0–0.05)
IMM GRANULOCYTES NFR BLD AUTO: 0.7 % (ref 0–0.5)
INR PPP: 1.35 (ref 0.9–1.1)
LYMPHOCYTES # BLD AUTO: 0.67 10*3/MM3 (ref 0.7–3.1)
LYMPHOCYTES NFR BLD AUTO: 7.7 % (ref 19.6–45.3)
MAGNESIUM SERPL-MCNC: 1.9 MG/DL (ref 1.6–2.4)
MCH RBC QN AUTO: 28.5 PG (ref 26.6–33)
MCHC RBC AUTO-ENTMCNC: 32.4 G/DL (ref 31.5–35.7)
MCV RBC AUTO: 88 FL (ref 79–97)
MONOCYTES # BLD AUTO: 0.57 10*3/MM3 (ref 0.1–0.9)
MONOCYTES NFR BLD AUTO: 6.6 % (ref 5–12)
NEUTROPHILS # BLD AUTO: 7.34 10*3/MM3 (ref 1.7–7)
NEUTROPHILS NFR BLD AUTO: 84.3 % (ref 42.7–76)
NRBC BLD AUTO-RTO: 0 /100 WBC (ref 0–0.2)
PHOSPHATE SERPL-MCNC: 3.9 MG/DL (ref 2.5–4.5)
PLATELET # BLD AUTO: 127 10*3/MM3 (ref 140–450)
PMV BLD AUTO: 11.8 FL (ref 6–12)
POTASSIUM BLD-SCNC: 3.4 MMOL/L (ref 3.5–5.2)
PROT SERPL-MCNC: 4.9 G/DL (ref 6–8.5)
PROTHROMBIN TIME: 16.4 SECONDS (ref 11.7–14.2)
RBC # BLD AUTO: 3.26 10*6/MM3 (ref 3.77–5.28)
SODIUM BLD-SCNC: 142 MMOL/L (ref 136–145)
WBC NRBC COR # BLD: 8.7 10*3/MM3 (ref 3.4–10.8)

## 2020-04-05 PROCEDURE — 85025 COMPLETE CBC W/AUTO DIFF WBC: CPT | Performed by: INTERNAL MEDICINE

## 2020-04-05 PROCEDURE — 25010000002 DIGOXIN PER 500 MCG: Performed by: NURSE PRACTITIONER

## 2020-04-05 PROCEDURE — 85014 HEMATOCRIT: CPT | Performed by: INTERNAL MEDICINE

## 2020-04-05 PROCEDURE — 63710000001 INSULIN LISPRO (HUMAN) PER 5 UNITS: Performed by: INTERNAL MEDICINE

## 2020-04-05 PROCEDURE — 85610 PROTHROMBIN TIME: CPT | Performed by: INTERNAL MEDICINE

## 2020-04-05 PROCEDURE — 84100 ASSAY OF PHOSPHORUS: CPT | Performed by: INTERNAL MEDICINE

## 2020-04-05 PROCEDURE — 93005 ELECTROCARDIOGRAM TRACING: CPT | Performed by: INTERNAL MEDICINE

## 2020-04-05 PROCEDURE — 25010000002 HYDRALAZINE PER 20 MG: Performed by: INTERNAL MEDICINE

## 2020-04-05 PROCEDURE — 85730 THROMBOPLASTIN TIME PARTIAL: CPT | Performed by: INTERNAL MEDICINE

## 2020-04-05 PROCEDURE — 80053 COMPREHEN METABOLIC PANEL: CPT | Performed by: INTERNAL MEDICINE

## 2020-04-05 PROCEDURE — 82962 GLUCOSE BLOOD TEST: CPT

## 2020-04-05 PROCEDURE — 85018 HEMOGLOBIN: CPT | Performed by: INTERNAL MEDICINE

## 2020-04-05 PROCEDURE — 99232 SBSQ HOSP IP/OBS MODERATE 35: CPT | Performed by: INTERNAL MEDICINE

## 2020-04-05 PROCEDURE — 83735 ASSAY OF MAGNESIUM: CPT | Performed by: NURSE PRACTITIONER

## 2020-04-05 PROCEDURE — 93010 ELECTROCARDIOGRAM REPORT: CPT | Performed by: INTERNAL MEDICINE

## 2020-04-05 RX ORDER — DIGOXIN 0.25 MG/ML
250 INJECTION INTRAMUSCULAR; INTRAVENOUS ONCE
Status: COMPLETED | OUTPATIENT
Start: 2020-04-05 | End: 2020-04-05

## 2020-04-05 RX ADMIN — HYDRALAZINE HYDROCHLORIDE 20 MG: 20 INJECTION INTRAMUSCULAR; INTRAVENOUS at 05:18

## 2020-04-05 RX ADMIN — SODIUM CHLORIDE 8 MG/HR: 900 INJECTION INTRAVENOUS at 03:58

## 2020-04-05 RX ADMIN — INSULIN LISPRO 2 UNITS: 100 INJECTION, SOLUTION INTRAVENOUS; SUBCUTANEOUS at 05:18

## 2020-04-05 RX ADMIN — SODIUM CHLORIDE 8 MG/HR: 900 INJECTION INTRAVENOUS at 19:13

## 2020-04-05 RX ADMIN — SODIUM CHLORIDE 12.5 MG/HR: 9 INJECTION, SOLUTION INTRAVENOUS at 19:12

## 2020-04-05 RX ADMIN — SODIUM CHLORIDE 12.5 MG/HR: 9 INJECTION, SOLUTION INTRAVENOUS at 16:36

## 2020-04-05 RX ADMIN — SODIUM CHLORIDE 8 MG/HR: 900 INJECTION INTRAVENOUS at 14:09

## 2020-04-05 RX ADMIN — INSULIN LISPRO 2 UNITS: 100 INJECTION, SOLUTION INTRAVENOUS; SUBCUTANEOUS at 21:23

## 2020-04-05 RX ADMIN — SODIUM CHLORIDE 7.5 MG/HR: 9 INJECTION, SOLUTION INTRAVENOUS at 13:31

## 2020-04-05 RX ADMIN — SODIUM CHLORIDE 8 MG/HR: 900 INJECTION INTRAVENOUS at 23:25

## 2020-04-05 RX ADMIN — SODIUM CHLORIDE 8 MG/HR: 900 INJECTION INTRAVENOUS at 09:04

## 2020-04-05 RX ADMIN — DIGOXIN 250 MCG: 0.25 INJECTION INTRAMUSCULAR; INTRAVENOUS at 20:15

## 2020-04-05 RX ADMIN — SODIUM CHLORIDE 5 MG/HR: 9 INJECTION, SOLUTION INTRAVENOUS at 09:14

## 2020-04-05 NOTE — PROGRESS NOTES
"      [facility]         Dr Rodriguez Sayied   LOS: 9 days   Patient Care Team:  Eric Matta MD as PCP - General (General Practice)    Chief Complaint: Status post GI bleed hemorrhagic shock oral bleeding worsening renal failure multiple medical issues as listed below.    Interval History: Confused and sleepy this morning.  Not following commands.  Bleeding from the oral cavity appears to be decreasing.    REVIEW OF SYSTEMS:   Confused    Ventilator/Non-Invasive Ventilation Settings (From admission, onward)    None            Vital Signs  Temp:  [97.9 °F (36.6 °C)-98.7 °F (37.1 °C)] 98.5 °F (36.9 °C)  Heart Rate:  [62-78] 73  Resp:  [17-18] 17  BP: (184-241)/() 184/69    Intake/Output Summary (Last 24 hours) at 4/5/2020 1321  Last data filed at 4/5/2020 0522  Gross per 24 hour   Intake 220 ml   Output 700 ml   Net -480 ml     Flowsheet Rows      First Filed Value   Admission Height  165.1 cm (65\") Documented at 03/27/2020 1029   Admission Weight  60.3 kg (133 lb) Documented at 03/27/2020 1029          Physical Exam:   General Appearance:   More awake and tracking.  Not following commands.  Some dried blood noted around the oral cavity  Eyes scleral icterus   Lungs:    Diminished breath sounds rhonchi bilaterally on the basis    Heart:    Regular rhythm and normal rate, normal S1 and S2, no            murmur, no gallop, no rub, no click   Chest Wall:    No abnormalities observed   Abdomen:    Soft mild diffuse tenderness.  No rebound or guarding   Extremities:   Moves all extremities well, no edema, no cyanosis, no             redness  CNS confused     Results Review:        Results from last 7 days   Lab Units 04/05/20  0509 04/04/20  0546 04/03/20  0517   SODIUM mmol/L 142 138 145   POTASSIUM mmol/L 3.4* 3.7 3.6   CHLORIDE mmol/L 103 100 101   CO2 mmol/L 24.3 23.2 24.1   BUN mg/dL 51* 42* 84*   CREATININE mg/dL 2.11* 2.06* 3.65*   GLUCOSE mg/dL 167* 153* 154*   CALCIUM mg/dL 8.3* 8.2* 8.1* " "    Results from last 7 days   Lab Units 04/02/20  2304   TROPONIN T ng/mL 0.023     Results from last 7 days   Lab Units 04/05/20  0510 04/04/20  2344 04/04/20  1533 04/04/20  0546 04/03/20  0517   WBC 10*3/mm3 8.70  --   --  9.15 11.08*   HEMOGLOBIN g/dL 9.3* 9.7* 9.5* 9.2* 7.5*   HEMATOCRIT % 28.7* 29.9* 29.0* 27.6* 22.4*   PLATELETS 10*3/mm3 127*  --   --  102* 88*     Results from last 7 days   Lab Units 04/05/20  0509 04/03/20  0517 04/02/20  0534  03/30/20  1536   INR  1.35* 1.68* 1.69*   < > 1.62*   APTT seconds 37.5*  --   --   --  46.6*    < > = values in this interval not displayed.         Results from last 7 days   Lab Units 04/05/20  0509   MAGNESIUM mg/dL 1.9               I reviewed the patient's new clinical results.  I personally viewed and interpreted the patient's CXR        Medication Review:     insulin lispro 0-7 Units Subcutaneous 4x Daily With Meals & Nightly         niCARdipine 5-15 mg/hr Last Rate: 7.5 mg/hr (04/05/20 1204)   pantoprazole 8 mg/hr Last Rate: 8 mg/hr (04/05/20 0904)       ASSESSMENT:   1. Hemorrhagic shock -- better  2. Coagulopathy due to eliquis -- s/p kcentra reversal.  3. Acute blood loss anemia due to GI bleed-- protonix gtt, transfusion as needed, monitor hemoglobin EGD with DU and gastritis  4. Acute kidney injury -- iv fluids and monitor renal function, discussed with Dr. Atkins   5. Hyperkalemia --  \"  \"  6. Hyponatremia -- monitor  7. Elevated liver enzymes -- ct abd/pelvis ok, ultrasound done, gi following  8. Acute pancreatitis mild  9. Sepsis -- can't r/o --continue with rocephin,   10. Dementia not at baseline -- she is on neurotnin, ativan, and zyprexa at home, will have to hold these for now  11. DMII with hyperglycemia -- ssi --accuchecks ok  12. Hyperlipidemia -- hold statin  13. Thrombocytopenia -worsening  14. Small hiatal hernia  15. Erosive gastritis  16. 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  Encephalopathy    PLAN:  Mental status waxing and waning and " actually some better yesterday with blood transfusion and noted input from neurology regarding possibility of subacute CVA.  Unable to anticoagulate due to current bleeding condition.   It seems her mental status is improving some with dialysis and blood transfusion.  Continue on dialysis per nephrology  Remains on PPI drip with stable hemoglobin now posttransfusion.  Continue to monitor hemoglobin closely.  ID input noted.  They have stopped antibiotics and continues to observe  Hematology input noted with improving platelet counts.  I had a detailed discussion with the patient's  who at this time wishes to pursue full code. Due to poor mental status she is not able to eat.  May have to consider alternative means of nutrition if mental status does not improve post dialysis.  Appreciate input from hospitalist in managing multiple medical issues.  She has been stable for several days.  I will sign off    Jennifer Bryan MD  04/05/20  13:21

## 2020-04-05 NOTE — PLAN OF CARE
Pt started on Cardine drip; titratable.  Started with 5mg, then 7.5mg then 10mg Based on BP results every 15 mins.  Dr. Avila wants her BP goal to be 150/90.  So far lowest has been 165/63 with HR 77.  Pt is sleeping more today than yesterday.  Dialysis orders place for tomorrow.  Still waiting for family to make decision on Code status and future plan of pt regarding care.  Will cont to monitor.    Problem: Fall Risk (Adult)  Goal: Absence of Fall  Outcome: Ongoing (interventions implemented as appropriate)     Problem: Restraint, Nonbehavioral (Nonviolent)  Goal: Rationale and Justification  Outcome: Ongoing (interventions implemented as appropriate)  Goal: Nonbehavioral (Nonviolent) Restraint: Absence of Injury/Harm  Outcome: Ongoing (interventions implemented as appropriate)  Goal: Nonbehavioral (Nonviolent) Restraint: Achievement of Discontinuation Criteria  Outcome: Ongoing (interventions implemented as appropriate)  Goal: Nonbehavioral (Nonviolent) Restraint: Preservation of Dignity and Wellbeing  Outcome: Ongoing (interventions implemented as appropriate)     Problem: Renal Failure/Kidney Injury, Acute (Adult)  Goal: Signs and Symptoms of Listed Potential Problems Will be Absent, Minimized or Managed (Renal Failure/Kidney Injury, Acute)  Outcome: Ongoing (interventions implemented as appropriate)     Problem: Pain, Acute (Adult)  Goal: Acceptable Pain Control/Comfort Level  Outcome: Ongoing (interventions implemented as appropriate)     Problem: Gastrointestinal Bleeding (Adult)  Goal: Signs and Symptoms of Listed Potential Problems Will be Absent, Minimized or Managed (Gastrointestinal Bleeding)  Outcome: Ongoing (interventions implemented as appropriate)     Problem: Skin Injury Risk (Adult)  Goal: Skin Health and Integrity  Outcome: Ongoing (interventions implemented as appropriate)     Problem: Patient Care Overview  Goal: Plan of Care Review  Outcome: Ongoing (interventions implemented as  appropriate)  Goal: Individualization and Mutuality  Outcome: Ongoing (interventions implemented as appropriate)  Goal: Discharge Needs Assessment  Outcome: Ongoing (interventions implemented as appropriate)  Goal: Interprofessional Rounds/Family Conf  Outcome: Ongoing (interventions implemented as appropriate)

## 2020-04-05 NOTE — PROGRESS NOTES
"   LOS: 9 days    Patient Care Team:  Eric Matta MD as PCP - General (General Practice)    Chief Complaint:    Chief Complaint   Patient presents with   • Altered Mental Status   • Black or Bloody Stool     Follow UP KIERAN  Subjective     Interval History:     Seen and examined. Laying in bed. Opens her eyes more today.  Does not follow command.  Restraints remaining in place.  Review of Systems:   Unable to obtain.     Objective     Vital Signs  Temp:  [97.9 °F (36.6 °C)-98.7 °F (37.1 °C)] 98.3 °F (36.8 °C)  Heart Rate:  [50-78] 73  Resp:  [17-18] 17  BP: (185-241)/() 185/72    Flowsheet Rows      First Filed Value   Admission Height  165.1 cm (65\") Documented at 03/27/2020 1029   Admission Weight  60.3 kg (133 lb) Documented at 03/27/2020 1029          No intake/output data recorded.  I/O last 3 completed shifts:  In: 220 [IV Piggyback:220]  Out: 1200 [Urine:1200]    Intake/Output Summary (Last 24 hours) at 4/5/2020 1117  Last data filed at 4/5/2020 0522  Gross per 24 hour   Intake 220 ml   Output 700 ml   Net -480 ml       Physical Exam:  Elderly wf.  Oral mucosa with signif dried   Neck no jvd  Heart RRR No s3 or rub  Lungs clear to auscultation, no wheezing  Abd Distended, slightly tender, no guarding or rebound  Body wall edema  Ext 1+ upper and lower ext edema.       Results Review:    Results from last 7 days   Lab Units 04/05/20  0509 04/04/20  0546 04/03/20  0517   SODIUM mmol/L 142 138 145   POTASSIUM mmol/L 3.4* 3.7 3.6   CHLORIDE mmol/L 103 100 101   CO2 mmol/L 24.3 23.2 24.1   BUN mg/dL 51* 42* 84*   CREATININE mg/dL 2.11* 2.06* 3.65*   CALCIUM mg/dL 8.3* 8.2* 8.1*   BILIRUBIN mg/dL 2.3* 2.6* 3.0*   ALK PHOS U/L 379* 396* 421*   ALT (SGPT) U/L 36* 39* 50*   AST (SGOT) U/L 51* 71* 96*   GLUCOSE mg/dL 167* 153* 154*       Estimated Creatinine Clearance: 21.6 mL/min (A) (by C-G formula based on SCr of 2.11 mg/dL (H)).    Results from last 7 days   Lab Units 04/05/20  0509 04/04/20  0546 " 04/03/20  0517 04/02/20  0534   MAGNESIUM mg/dL 1.9 1.9  --  2.1   PHOSPHORUS mg/dL 3.9  --  4.9*  --              Results from last 7 days   Lab Units 04/05/20  0510 04/04/20  2344 04/04/20  1533 04/04/20  0546 04/03/20  0517 04/02/20  2304 04/02/20  0534   WBC 10*3/mm3 8.70  --   --  9.15 11.08* 12.27* 13.49*   HEMOGLOBIN g/dL 9.3* 9.7* 9.5* 9.2* 7.5* 7.7* 8.1*   PLATELETS 10*3/mm3 127*  --   --  102* 88* 84* 86*       Results from last 7 days   Lab Units 04/05/20  0509 04/03/20  0517 04/02/20  0534 04/01/20  0520 03/31/20  1440   INR  1.35* 1.68* 1.69* 1.79* 1.73*         Imaging Results (Last 24 Hours)     ** No results found for the last 24 hours. **          insulin lispro 0-9 Units Subcutaneous Q4H       dilTIAZem 5-15 mg/hr Last Rate: 5 mg/hr (04/03/20 1351)   niCARdipine 5-15 mg/hr Last Rate: 5 mg/hr (04/05/20 0914)   pantoprazole 8 mg/hr Last Rate: 8 mg/hr (04/05/20 0904)       Medication Review:   Current Facility-Administered Medications   Medication Dose Route Frequency Provider Last Rate Last Dose   • acetaminophen (TYLENOL) tablet 650 mg  650 mg Oral Q4H PRN Boogie Saavedra MD        Or   • acetaminophen (TYLENOL) suppository 650 mg  650 mg Rectal Q4H PRN Boogie Saavedra MD       • dextrose (D50W) 25 g/ 50mL Intravenous Solution 25 g  25 g Intravenous Q15 Min PRN Boogie Saavedra MD   25 g at 03/30/20 0033   • dextrose (GLUTOSE) oral gel 15 g  15 g Oral Q15 Min PRN Boogie Saavedra MD       • dilTIAZem (CARDIZEM) 100 mg in 100 mL NS (1 mg/mL) infusion  5-15 mg/hr Intravenous Titrated Roni Gutierrez DO 5 mL/hr at 04/03/20 1351 5 mg/hr at 04/03/20 1351   • glucagon (human recombinant) (GLUCAGEN DIAGNOSTIC) injection 1 mg  1 mg Subcutaneous Q15 Min PRN Boogie Saavedra MD       • hydrALAZINE (APRESOLINE) injection 20 mg  20 mg Intravenous Q6H PRN Chandana Kapoor MD   20 mg at 04/05/20 0518   • insulin lispro (humaLOG) injection 0-9 Units  0-9 Units Subcutaneous Q4H Boogie Saavedra MD   2  Units at 04/05/20 0518   • ipratropium-albuterol (DUO-NEB) nebulizer solution 3 mL  3 mL Nebulization Q2H PRN Boogie Saavedra MD       • magic mouthwash oral supsension 15 mL  15 mL Swish & Spit Q4H PRN Mario Saavedra MD   15 mL at 04/01/20 2022   • metoprolol tartrate (LOPRESSOR) injection 5 mg  5 mg Intravenous Q6H PRN Roni Gutierrez, DO       • niCARdipine (CARDENE) 25 mg in 250 mL NS (0.1 mg/mL) infusion kit  5-15 mg/hr Intravenous Titrated Ajit Avila Jr., MD 50 mL/hr at 04/05/20 0914 5 mg/hr at 04/05/20 0914   • ondansetron (ZOFRAN) tablet 4 mg  4 mg Oral Q6H PRN Boogie Saavedra MD        Or   • ondansetron (ZOFRAN) injection 4 mg  4 mg Intravenous Q6H PRN Boogie Saavedra MD       • pantoprazole (PROTONIX) 40 mg/100 mL (0.4 mg/mL) in 0.9% NS IVPB  8 mg/hr Intravenous Continuous Boogie Saavedra MD 20 mL/hr at 04/05/20 0904 8 mg/hr at 04/05/20 0904       Assessment/Plan   1. KIERAN with baseline unknown after 9/19 when renal function was normal.    2. Oral mucosal bleeding without clear source.   3. Coagulopathy.   4. TCP.   5. UGI bleeding with duodenal ulcer, gastritis.  Hg stable.   6. Elevated liver enzymes with distended gallbladder  7. Leukocytosis  8. Positive IGG HSV.   9. Dementia with superimposed metabolic encephalopathy.    Plan:  We will hold on dialysis today and plan for HD at AM on Monday  Prognosis remain poor. Palliative care is appropriate   wishes to continue aggressive treatment at this time.  Hopefully is going to talk to his place and get back to us on Monday  Surveillance labs      Vish Rodriguez MD  04/05/20  11:17

## 2020-04-05 NOTE — NURSING NOTE
Started Cardine Drip at 0914 at 5mg/hr with a BP of 208/78.  Just titrated Cardine Drip to 7.5mg/hr.  /69. Just spoke to Dr. Avila and he said that her goal is 150/90 and maintain for 24 hours.

## 2020-04-05 NOTE — PROGRESS NOTES
Name: Darby Workman ADMIT: 3/27/2020   : 1945  PCP: Eric Matta MD    MRN: [id number] LOS: 9 days   AGE/SEX: 75 y.o. female  ROOM: [facility]   Subjective   Chief Complaint   Patient presents with   • Altered Mental Status   • Black or Bloody Stool      Discussed her condition and prognosis with her spouse, Mr Workman, and daughter over the phone last night. Updated them on GI and oral bleeding. Renal failure, liver dysfunction, arrhythmia, and stroke. Discussed her poor prognosis as noted by consulting services and also that I did not think she had much chance for recovery. They were appreciative of information and told me they were going to discuss things further with their  and then call with update. We did talk about palliative care and considering changing her code status in the event of acute arrest. They wished to discuss further as family before making any decision. I did tell them about her additional GI bleeding and that if this continues then she may decline quickly.    Patient is currently a bit more alert today than yesterday. She nodded no when asked about pain and shortness of breath. Maintained gaze and blinked multiple times. She is in restraints. Not following commands such as moving fingers or toes. Difficult to determine amount of comprehension but I did attempt to update her on her current issues.    Objective   Vital Signs  Temp:  [97.9 °F (36.6 °C)-98.7 °F (37.1 °C)] 98.3 °F (36.8 °C)  Heart Rate:  [50-78] 73  Resp:  [17-18] 17  BP: (185-241)/() 185/72  SpO2:  [94 %-100 %] 98 %  on  Flow (L/min):  [2] 2;   Device (Oxygen Therapy): nasal cannula  Body mass index is 22.12 kg/m².    Physical Exam   Constitutional: She appears well-developed.   frail   HENT:   Head: Atraumatic.   Nose: Nose normal.   Dried blood of oral mucosa   Eyes: Pupils are equal, round, and reactive to light. Right eye exhibits no discharge. Left eye exhibits no discharge.   Neck: Neck supple. No  tracheal deviation present.   Cardiovascular: Normal rate and regular rhythm.   Pulmonary/Chest: Effort normal. She has decreased breath sounds. She has no wheezes.   Abdominal: Soft. She exhibits no distension. There is no tenderness.   Musculoskeletal: She exhibits no edema or tenderness.   Neurological: She is alert.   Not following commands   Skin: Skin is dry. She is not diaphoretic.   Some petichia present over upper chest. Did not see any purpura   Psychiatric: Cognition and memory are impaired.   Nursing note and vitals reviewed.      Results Review:       I reviewed the patient's new clinical results.     I reviewed telemetry/EKG results, agree with interpretation.      Results from last 7 days   Lab Units 04/05/20  0510 04/04/20  2344 04/04/20  1533 04/04/20  0546 04/03/20  0517 04/02/20  2304   WBC 10*3/mm3 8.70  --   --  9.15 11.08* 12.27*   HEMOGLOBIN g/dL 9.3* 9.7* 9.5* 9.2* 7.5* 7.7*   PLATELETS 10*3/mm3 127*  --   --  102* 88* 84*     Results from last 7 days   Lab Units 04/05/20  0509 04/04/20  0546 04/03/20  0517 04/02/20  2304   SODIUM mmol/L 142 138 145 144   POTASSIUM mmol/L 3.4* 3.7 3.6 3.5   CHLORIDE mmol/L 103 100 101 101   CO2 mmol/L 24.3 23.2 24.1 24.0   BUN mg/dL 51* 42* 84* 81*   CREATININE mg/dL 2.11* 2.06* 3.65* 3.75*   GLUCOSE mg/dL 167* 153* 154* 133*   Estimated Creatinine Clearance: 21.6 mL/min (A) (by C-G formula based on SCr of 2.11 mg/dL (H)).  Results from last 7 days   Lab Units 04/05/20  0509 04/04/20  0546 04/03/20  0517 04/02/20  2304 04/02/20  0534   CALCIUM mg/dL 8.3* 8.2* 8.1* 7.9* 7.6*   ALBUMIN g/dL 2.40* 2.30* 2.30* 2.20* 2.30*   MAGNESIUM mg/dL 1.9 1.9  --   --  2.1   PHOSPHORUS mg/dL 3.9  --  4.9*  --   --          insulin lispro 0-9 Units Subcutaneous Q4H       niCARdipine 5-15 mg/hr Last Rate: 5 mg/hr (04/05/20 0914)   pantoprazole 8 mg/hr Last Rate: 8 mg/hr (04/05/20 0904)   Diet Clear Liquid    Assessment/Plan      Active Hospital Problems    Diagnosis  POA   •  **Upper GI bleed [K92.2]  Yes   • Uremic encephalopathy [G93.41, N19]  Yes   • Acute kidney injury (CMS/HCC) [N17.9]  Yes   • Acute pancreatitis [K85.90]  Yes   • Hemorrhagic shock (CMS/HCC) [R57.8]  Yes   • Thrombocytopenia (CMS/HCC) [D69.6]  Yes   • Type 2 diabetes mellitus with hyperglycemia (CMS/HCC) [E11.65]  Yes   • Dementia without behavioral disturbance (CMS/HCC) [F03.90]  Yes   • Acute posthemorrhagic anemia [D62]  Yes   • Hypertension [I10]  Yes   • Hyperlipidemia [E78.5]  Yes      Resolved Hospital Problems   No resolved problems to display.       · GIB/Hemorrhagic Shock: Required transfusion, kcentra. Duodenal Ulcer and erosive gastritis on EGD. Anticoagulation held. Also has oral component of blood loss as well. GI, Surgery, and Oncology evaluated. All have recommended palliative care given her poor prognosis and multiorgan failure. PPI ongoing. Has continued oozing, GI losses and now with some petechiae. Platelets are stable. Despite to rectal blood Hgb has remained stable so far. Continue to monitor hgb and check fibrinogen level. May need GI to reconsult if this continues and family wants to continue aggressive care tomorrow.   · CVA: Left MCA CVA. Subacute based on CT and aphasia. Neurology evaluated. She is not AC or antiplatelet candidate at this time. She had this even while she was on Eliquis and aspirin prior to admission.  · Dementia  · Leukocytosis: Reactive in nature. No antibiotics indicated. Infectious Disease evaluated.  · AFib: Rate ok now and became bradycardic. Cardiology following.  · HTN: Lopressor and diltiazem stopped due to bradycardia. HR improved but uncontrolled HTN. Hydralazine helped some yesterday but worse this morning. Nicardipine started.  · TCP: See above  · KIERAN: Dialysis intiated. A bit more interactive with me today than yesterday. Still requiring restraints. Nephrology following.  · Disposition: Poor prognosis. Discussed with family last night as above. Should have  update on goals of care tomorrow from them.    Discussed with Dr Jennifer Kapoor MD  [facility]  04/05/20  11:47    Dictated portions using Dragon dictation software.

## 2020-04-05 NOTE — PROGRESS NOTES
"[facility]    Patient Name: Darby Workman  :1945  75 y.o.  LOS: 9  Encounter Provider: Ajit Avila Jr, MD      Patient Care Team:  Eric Matta MD as PCP - General (General Practice)    Chief Complaint: Follow-up atrial fibrillation RVR, GI bleed, acute blood loss anemia    Interval History: No acute issues overnight       Objective   Vital Signs  Temp:  [97.9 °F (36.6 °C)-98.7 °F (37.1 °C)] 98.3 °F (36.8 °C)  Heart Rate:  [50-78] 73  Resp:  [17-18] 17  BP: (185-241)/() 185/72    Intake/Output Summary (Last 24 hours) at 2020 1131  Last data filed at 2020 0522  Gross per 24 hour   Intake 220 ml   Output 700 ml   Net -480 ml     Flowsheet Rows      First Filed Value   Admission Height  165.1 cm (65\") Documented at 2020 1029   Admission Weight  60.3 kg (133 lb) Documented at 2020 1029            Physical Exam   Constitutional:   Lethargic with response to painful stimuli only   HENT:   Head: Normocephalic and atraumatic.   Eyes: Pupils are equal, round, and reactive to light. Right eye exhibits no discharge. Left eye exhibits no discharge.   Neck: No JVD present. No thyromegaly present.   Cardiovascular: Exam reveals no gallop and no friction rub.   Murmur heard.  Pulmonary/Chest: No respiratory distress. She exhibits no tenderness.   Poor air entry posterior lung fields   Abdominal: Bowel sounds are normal. She exhibits no distension.   Musculoskeletal: She exhibits no edema or tenderness.   Neurological:   Response to painful stimuli only   Skin: No rash noted. No erythema.   Psychiatric: She has a normal mood and affect. Judgment normal.   Vitals reviewed.        Pertinent Test Results:  Results from last 7 days   Lab Units 20  0509 20  0546 20  0517 20  2304 20  0534 20  0520 20  0511   SODIUM mmol/L 142 138 145 144 147* 142 138   POTASSIUM mmol/L 3.4* 3.7 3.6 3.5 3.2* 3.8 4.1   CHLORIDE mmol/L 103 100 101 101 " 102 97* 93*   CO2 mmol/L 24.3 23.2 24.1 24.0 25.7 25.6 27.1   BUN mg/dL 51* 42* 84* 81* 122* 134* 119*   CREATININE mg/dL 2.11* 2.06* 3.65* 3.75* 5.65* 6.02* 5.87*   GLUCOSE mg/dL 167* 153* 154* 133* 156* 168* 121*   CALCIUM mg/dL 8.3* 8.2* 8.1* 7.9* 7.6* 6.6* 6.8*   AST (SGOT) U/L 51* 71* 96* 96* 95* 86* 97*   ALT (SGPT) U/L 36* 39* 50* 51* 55* 59* 73*     Results from last 7 days   Lab Units 04/02/20  2304   TROPONIN T ng/mL 0.023     Results from last 7 days   Lab Units 04/05/20  0510 04/04/20  2344 04/04/20  1533 04/04/20  0546 04/03/20  0517 04/02/20  2304 04/02/20  0534 04/01/20  0520 03/31/20  0511   WBC 10*3/mm3 8.70  --   --  9.15 11.08* 12.27* 13.49* 15.83* 20.66*   HEMOGLOBIN g/dL 9.3* 9.7* 9.5* 9.2* 7.5* 7.7* 8.1* 8.8* 10.2*   HEMATOCRIT % 28.7* 29.9* 29.0* 27.6* 22.4* 23.3* 24.2* 25.8* 30.1*   PLATELETS 10*3/mm3 127*  --   --  102* 88* 84* 86* 83* 87*     Results from last 7 days   Lab Units 04/05/20  0509 04/03/20  0517 04/02/20  0534 04/01/20  0520 03/31/20  1440 03/30/20  1536   INR  1.35* 1.68* 1.69* 1.79* 1.73* 1.62*   APTT seconds 37.5*  --   --   --   --  46.6*     Results from last 7 days   Lab Units 04/05/20  0509 04/04/20  0546 04/02/20  0534   MAGNESIUM mg/dL 1.9 1.9 2.1           Invalid input(s): LDLCALC                Medication Review:     insulin lispro 0-9 Units Subcutaneous Q4H          dilTIAZem 5-15 mg/hr Last Rate: 5 mg/hr (04/03/20 1351)   niCARdipine 5-15 mg/hr Last Rate: 5 mg/hr (04/05/20 0914)   pantoprazole 8 mg/hr Last Rate: 8 mg/hr (04/05/20 0904)       Assessment/Plan   1. Atrial fibrillation with RVR: Spontaneous cardioversion.  Patient unable to take any p.o. at this time and was on very high dose oral beta-blocker.    Bradycardia with even low-dose Lopressor will hold beta-blocker for now. AC on hold for obvious reasons.   2. Chronic AC therapy: eliquis and plavix on hold due to GI bleed  3. Hypertension -very elevated with systolic blood pressure ranging between 190 to  210 mmHg.  Start Cardene drip with blood pressure target 150/90 over first 12 to 24 hours.  Long discussion with nursing staff and I do not want blood pressure below this range over the first 12 to 24-hours.  Once better controlled we can wean drip and add hydralazine.  4. Upper GI Bleed & oral bleeding: EGD showed duodenal bulb ulcer and erosive gastritis.  GI following.   5. Acute blood loss anemia: due to #4  6. Metabolic encephalopathy with history of dementia: baseline mental status unclear. Neurology following.  CT head 3/31 was concerning for possible MCA stroke.   7. Previous embolic CVA: plavix and eliquis on hold, as above.  Neurology following.   8. Possible sepsis: was treated with abx which have now been stopped by ID.  Positive IGG HSV  9. Acute renal failure: nephrology following.  On dialysis with uremia.   10. Liver failure: GI following  11. Thrombocytopenia, leukocytosis: hematology following.   12. Hyperlipidemia: statins on hold due to #8  13. Type II DM        Poor prognosis with ongoing talks for goals of care with family per internal medicine and pulmonology.    And Cardene drip.  Goals of care to be discussed with family again in the morning.       Ajit Avila Jr, MD  [facility]  04/05/20  11:31 AM

## 2020-04-05 NOTE — PLAN OF CARE
Opens eyes spontaneously, no attempt to verbalize, does not follow simple commands, restraints remain in use to prevent pulling of lines and tubes, when hands untied they quickly go to neck. Eyes closed at short interval, resting quietly, NSR on monitor, will  continue to monitor

## 2020-04-06 LAB
ALBUMIN SERPL-MCNC: 2.5 G/DL (ref 3.5–5.2)
ALBUMIN/GLOB SERPL: 0.9 G/DL
ALP SERPL-CCNC: 366 U/L (ref 39–117)
ALT SERPL W P-5'-P-CCNC: 31 U/L (ref 1–33)
ANION GAP SERPL CALCULATED.3IONS-SCNC: 14.6 MMOL/L (ref 5–15)
AST SERPL-CCNC: 36 U/L (ref 1–32)
BASOPHILS # BLD AUTO: 0.01 10*3/MM3 (ref 0–0.2)
BASOPHILS NFR BLD AUTO: 0.1 % (ref 0–1.5)
BILIRUB SERPL-MCNC: 2.2 MG/DL (ref 0.2–1.2)
BUN BLD-MCNC: 47 MG/DL (ref 8–23)
BUN/CREAT SERPL: 28.1 (ref 7–25)
CALCIUM SPEC-SCNC: 8.5 MG/DL (ref 8.6–10.5)
CHLORIDE SERPL-SCNC: 109 MMOL/L (ref 98–107)
CO2 SERPL-SCNC: 22.4 MMOL/L (ref 22–29)
CREAT BLD-MCNC: 1.67 MG/DL (ref 0.57–1)
DEPRECATED RDW RBC AUTO: 45.8 FL (ref 37–54)
EOSINOPHIL # BLD AUTO: 0.12 10*3/MM3 (ref 0–0.4)
EOSINOPHIL NFR BLD AUTO: 1.4 % (ref 0.3–6.2)
ERYTHROCYTE [DISTWIDTH] IN BLOOD BY AUTOMATED COUNT: 14.1 % (ref 12.3–15.4)
FIBRINOGEN PPP-MCNC: 258 MG/DL (ref 219–464)
GFR SERPL CREATININE-BSD FRML MDRD: 30 ML/MIN/1.73
GLOBULIN UR ELPH-MCNC: 2.7 GM/DL
GLUCOSE BLD-MCNC: 180 MG/DL (ref 65–99)
GLUCOSE BLDC GLUCOMTR-MCNC: 122 MG/DL (ref 70–130)
GLUCOSE BLDC GLUCOMTR-MCNC: 157 MG/DL (ref 70–130)
GLUCOSE BLDC GLUCOMTR-MCNC: 170 MG/DL (ref 70–130)
GLUCOSE BLDC GLUCOMTR-MCNC: 175 MG/DL (ref 70–130)
HCT VFR BLD AUTO: 30.2 % (ref 34–46.6)
HCT VFR BLD AUTO: 31.1 % (ref 34–46.6)
HCT VFR BLD AUTO: 32 % (ref 34–46.6)
HGB BLD-MCNC: 10.1 G/DL (ref 12–15.9)
HGB BLD-MCNC: 10.6 G/DL (ref 12–15.9)
HGB BLD-MCNC: 9.8 G/DL (ref 12–15.9)
IMM GRANULOCYTES # BLD AUTO: 0.05 10*3/MM3 (ref 0–0.05)
IMM GRANULOCYTES NFR BLD AUTO: 0.6 % (ref 0–0.5)
LYMPHOCYTES # BLD AUTO: 0.46 10*3/MM3 (ref 0.7–3.1)
LYMPHOCYTES NFR BLD AUTO: 5.5 % (ref 19.6–45.3)
MAGNESIUM SERPL-MCNC: 1.8 MG/DL (ref 1.6–2.4)
MCH RBC QN AUTO: 28.7 PG (ref 26.6–33)
MCHC RBC AUTO-ENTMCNC: 32.5 G/DL (ref 31.5–35.7)
MCV RBC AUTO: 88.4 FL (ref 79–97)
MONOCYTES # BLD AUTO: 0.44 10*3/MM3 (ref 0.1–0.9)
MONOCYTES NFR BLD AUTO: 5.2 % (ref 5–12)
NEUTROPHILS # BLD AUTO: 7.32 10*3/MM3 (ref 1.7–7)
NEUTROPHILS NFR BLD AUTO: 87.2 % (ref 42.7–76)
NRBC BLD AUTO-RTO: 0 /100 WBC (ref 0–0.2)
PHOSPHATE SERPL-MCNC: 3.2 MG/DL (ref 2.5–4.5)
PLATELET # BLD AUTO: 175 10*3/MM3 (ref 140–450)
PMV BLD AUTO: 11.5 FL (ref 6–12)
POTASSIUM BLD-SCNC: 3.3 MMOL/L (ref 3.5–5.2)
PROT SERPL-MCNC: 5.2 G/DL (ref 6–8.5)
RBC # BLD AUTO: 3.52 10*6/MM3 (ref 3.77–5.28)
SODIUM BLD-SCNC: 146 MMOL/L (ref 136–145)
WBC NRBC COR # BLD: 8.4 10*3/MM3 (ref 3.4–10.8)

## 2020-04-06 PROCEDURE — 5A1D70Z PERFORMANCE OF URINARY FILTRATION, INTERMITTENT, LESS THAN 6 HOURS PER DAY: ICD-10-PCS | Performed by: INTERNAL MEDICINE

## 2020-04-06 PROCEDURE — 63710000001 INSULIN LISPRO (HUMAN) PER 5 UNITS: Performed by: INTERNAL MEDICINE

## 2020-04-06 PROCEDURE — 82962 GLUCOSE BLOOD TEST: CPT

## 2020-04-06 PROCEDURE — 85384 FIBRINOGEN ACTIVITY: CPT | Performed by: INTERNAL MEDICINE

## 2020-04-06 PROCEDURE — 80053 COMPREHEN METABOLIC PANEL: CPT | Performed by: INTERNAL MEDICINE

## 2020-04-06 PROCEDURE — 85025 COMPLETE CBC W/AUTO DIFF WBC: CPT | Performed by: INTERNAL MEDICINE

## 2020-04-06 PROCEDURE — 85018 HEMOGLOBIN: CPT | Performed by: INTERNAL MEDICINE

## 2020-04-06 PROCEDURE — 85014 HEMATOCRIT: CPT | Performed by: INTERNAL MEDICINE

## 2020-04-06 PROCEDURE — 84100 ASSAY OF PHOSPHORUS: CPT | Performed by: INTERNAL MEDICINE

## 2020-04-06 PROCEDURE — 25010000002 HEPARIN (PORCINE) PER 1000 UNITS: Performed by: INTERNAL MEDICINE

## 2020-04-06 PROCEDURE — 92610 EVALUATE SWALLOWING FUNCTION: CPT | Performed by: SPEECH-LANGUAGE PATHOLOGIST

## 2020-04-06 PROCEDURE — 83735 ASSAY OF MAGNESIUM: CPT | Performed by: NURSE PRACTITIONER

## 2020-04-06 PROCEDURE — 99232 SBSQ HOSP IP/OBS MODERATE 35: CPT | Performed by: INTERNAL MEDICINE

## 2020-04-06 RX ORDER — HEPARIN SODIUM 1000 [USP'U]/ML
4000 INJECTION, SOLUTION INTRAVENOUS; SUBCUTANEOUS ONCE
Status: COMPLETED | OUTPATIENT
Start: 2020-04-06 | End: 2020-04-06

## 2020-04-06 RX ORDER — PANTOPRAZOLE SODIUM 40 MG/10ML
40 INJECTION, POWDER, LYOPHILIZED, FOR SOLUTION INTRAVENOUS
Status: DISCONTINUED | OUTPATIENT
Start: 2020-04-06 | End: 2020-04-15 | Stop reason: ALTCHOICE

## 2020-04-06 RX ADMIN — SODIUM CHLORIDE 5 MG/HR: 900 INJECTION, SOLUTION INTRAVENOUS at 02:30

## 2020-04-06 RX ADMIN — INSULIN LISPRO 2 UNITS: 100 INJECTION, SOLUTION INTRAVENOUS; SUBCUTANEOUS at 08:44

## 2020-04-06 RX ADMIN — HEPARIN SODIUM 4000 UNITS: 1000 INJECTION INTRAVENOUS; SUBCUTANEOUS at 17:10

## 2020-04-06 RX ADMIN — INSULIN LISPRO 2 UNITS: 100 INJECTION, SOLUTION INTRAVENOUS; SUBCUTANEOUS at 13:00

## 2020-04-06 RX ADMIN — METOPROLOL TARTRATE 2.5 MG: 5 INJECTION, SOLUTION INTRAVENOUS at 00:36

## 2020-04-06 RX ADMIN — SODIUM CHLORIDE 5 MG/HR: 900 INJECTION, SOLUTION INTRAVENOUS at 19:32

## 2020-04-06 RX ADMIN — PANTOPRAZOLE SODIUM 40 MG: 40 INJECTION, POWDER, FOR SOLUTION INTRAVENOUS at 19:23

## 2020-04-06 RX ADMIN — SODIUM CHLORIDE 8 MG/HR: 900 INJECTION INTRAVENOUS at 05:30

## 2020-04-06 NOTE — PROGRESS NOTES
"   LOS: 10 days    Patient Care Team:  Eric Matta MD as PCP - General (General Practice)    Chief Complaint:    Chief Complaint   Patient presents with   • Altered Mental Status   • Black or Bloody Stool     Follow UP KIERAN  Subjective     Interval History:     Seen and examined. Laying in bed. Opens her eyes more today.  Does not follow command.  Restraints remaining in place.  He is back on a Cardene drip.  She remains a phasic.  Review of Systems:   Unable to obtain.     Objective     Vital Signs  Temp:  [97.6 °F (36.4 °C)-98.7 °F (37.1 °C)] 97.6 °F (36.4 °C)  Heart Rate:  [] 114  Resp:  [14-17] 16  BP: ()/(36-93) 126/84    Flowsheet Rows      First Filed Value   Admission Height  165.1 cm (65\") Documented at 03/27/2020 1029   Admission Weight  60.3 kg (133 lb) Documented at 03/27/2020 1029          No intake/output data recorded.  I/O last 3 completed shifts:  In: 220 [IV Piggyback:220]  Out: 1775 [Urine:1775]    Intake/Output Summary (Last 24 hours) at 4/6/2020 1229  Last data filed at 4/6/2020 0526  Gross per 24 hour   Intake --   Output 1075 ml   Net -1075 ml       Physical Exam:  Elderly wf.  Oral mucosa with signif dried   Neck no jvd  Heart RRR No s3 or rub  Lungs clear to auscultation, no wheezing  Abd Distended, slightly tender, no guarding or rebound  Body wall edema  Ext 1+ upper and lower ext edema.       Results Review:    Results from last 7 days   Lab Units 04/06/20  0525 04/05/20  0509 04/04/20  0546   SODIUM mmol/L 146* 142 138   POTASSIUM mmol/L 3.3* 3.4* 3.7   CHLORIDE mmol/L 109* 103 100   CO2 mmol/L 22.4 24.3 23.2   BUN mg/dL 47* 51* 42*   CREATININE mg/dL 1.67* 2.11* 2.06*   CALCIUM mg/dL 8.5* 8.3* 8.2*   BILIRUBIN mg/dL 2.2* 2.3* 2.6*   ALK PHOS U/L 366* 379* 396*   ALT (SGPT) U/L 31 36* 39*   AST (SGOT) U/L 36* 51* 71*   GLUCOSE mg/dL 180* 167* 153*       Estimated Creatinine Clearance: 27.9 mL/min (A) (by C-G formula based on SCr of 1.67 mg/dL (H)).    Results from last 7 days "   Lab Units 04/06/20  0525 04/05/20  0509 04/04/20  0546 04/03/20  0517   MAGNESIUM mg/dL 1.8 1.9 1.9  --    PHOSPHORUS mg/dL 3.2 3.9  --  4.9*             Results from last 7 days   Lab Units 04/06/20  0525 04/06/20  0010 04/05/20  1609 04/05/20  0510 04/04/20  2344  04/04/20  0546 04/03/20  0517 04/02/20  2304   WBC 10*3/mm3 8.40  --   --  8.70  --   --  9.15 11.08* 12.27*   HEMOGLOBIN g/dL 10.1* 9.8* 9.8* 9.3* 9.7*   < > 9.2* 7.5* 7.7*   PLATELETS 10*3/mm3 175  --   --  127*  --   --  102* 88* 84*    < > = values in this interval not displayed.       Results from last 7 days   Lab Units 04/05/20  0509 04/03/20  0517 04/02/20  0534 04/01/20  0520 03/31/20  1440   INR  1.35* 1.68* 1.69* 1.79* 1.73*         Imaging Results (Last 24 Hours)     ** No results found for the last 24 hours. **          insulin lispro 0-7 Units Subcutaneous 4x Daily With Meals & Nightly   pantoprazole 40 mg Intravenous BID AC       dilTIAZem 5-15 mg/hr Last Rate: 5 mg/hr (04/06/20 0913)       Medication Review:   Current Facility-Administered Medications   Medication Dose Route Frequency Provider Last Rate Last Dose   • acetaminophen (TYLENOL) tablet 650 mg  650 mg Oral Q4H PRN Boogie Saavedra MD        Or   • acetaminophen (TYLENOL) suppository 650 mg  650 mg Rectal Q4H PRN Boogie Saavedra MD       • dextrose (D50W) 25 g/ 50mL Intravenous Solution 25 g  25 g Intravenous Q15 Min PRN Boogie Saavedra MD   25 g at 03/30/20 0033   • dextrose (GLUTOSE) oral gel 15 g  15 g Oral Q15 Min PRN Boogie Saavedra MD       • dilTIAZem (CARDIZEM) 100 mg in 100 mL NS (1 mg/mL) infusion  5-15 mg/hr Intravenous Titrated Lucy Marinelli APRN 5 mL/hr at 04/06/20 0913 5 mg/hr at 04/06/20 0913   • glucagon (human recombinant) (GLUCAGEN DIAGNOSTIC) injection 1 mg  1 mg Subcutaneous Q15 Min PRN Boogie Saavedra MD       • hydrALAZINE (APRESOLINE) injection 20 mg  20 mg Intravenous Q6H PRN Chandana Kapoor MD   20 mg at 04/05/20 0518   • insulin lispro  (humaLOG) injection 0-7 Units  0-7 Units Subcutaneous 4x Daily With Meals & Nightly Chandana Kapoor MD   2 Units at 04/06/20 0844   • ipratropium-albuterol (DUO-NEB) nebulizer solution 3 mL  3 mL Nebulization Q2H PRN Boogie Saavedra MD       • magic mouthwash oral supsension 15 mL  15 mL Swish & Spit Q4H PRN Mario Saavedra MD   15 mL at 04/01/20 2022   • metoprolol tartrate (LOPRESSOR) injection 5 mg  5 mg Intravenous Q6H PRN Roni Gutierrez, DO       • ondansetron (ZOFRAN) tablet 4 mg  4 mg Oral Q6H PRN Boogie Saavedra MD        Or   • ondansetron (ZOFRAN) injection 4 mg  4 mg Intravenous Q6H PRN Boogie Saavedra MD       • pantoprazole (PROTONIX) injection 40 mg  40 mg Intravenous BID AC Chandana Kapoor MD           Assessment/Plan   1. KIERAN with baseline unknown after 9/19 when renal function was normal.    2. Oral mucosal bleeding without clear source.   3. Coagulopathy.   4. TCP.   5. UGI bleeding with duodenal ulcer, gastritis.  Hg stable.   6. Elevated liver enzymes with distended gallbladder  7. Leukocytosis  8. Positive IGG HSV.   9. Dementia with superimposed metabolic encephalopathy.    Plan:  HD today  Prognosis remain poor. Palliative care is appropriate   wishes to continue aggressive treatment at this time.  Hopefully is going to talk to his place and get back to us today  Surveillance labs      Vish Rodriguez MD  04/06/20  12:29

## 2020-04-06 NOTE — CODE DOCUMENTATION
Gallo with [facility] called. Clarified time to wait before further intervention as long as BP stable.  Passed information on to nightshift RN.

## 2020-04-06 NOTE — PLAN OF CARE
Pt cont on Cardizem drip.  HR currently in the 80's while in Dialysis.  BP stable.  2.5 liters taken off during Dialysis.  Code Status changed to DNR per the family.  Family will discuss possible Palliative when they call back tomorrow.  VSS.  Will cont to monitor.    Problem: Fall Risk (Adult)  Goal: Absence of Fall  Outcome: Ongoing (interventions implemented as appropriate)     Problem: Restraint, Nonbehavioral (Nonviolent)  Goal: Rationale and Justification  Outcome: Ongoing (interventions implemented as appropriate)  Goal: Nonbehavioral (Nonviolent) Restraint: Absence of Injury/Harm  Outcome: Ongoing (interventions implemented as appropriate)  Goal: Nonbehavioral (Nonviolent) Restraint: Achievement of Discontinuation Criteria  Outcome: Ongoing (interventions implemented as appropriate)  Goal: Nonbehavioral (Nonviolent) Restraint: Preservation of Dignity and Wellbeing  Outcome: Ongoing (interventions implemented as appropriate)     Problem: Renal Failure/Kidney Injury, Acute (Adult)  Goal: Signs and Symptoms of Listed Potential Problems Will be Absent, Minimized or Managed (Renal Failure/Kidney Injury, Acute)  Outcome: Ongoing (interventions implemented as appropriate)     Problem: Pain, Acute (Adult)  Goal: Acceptable Pain Control/Comfort Level  Outcome: Ongoing (interventions implemented as appropriate)     Problem: Gastrointestinal Bleeding (Adult)  Goal: Signs and Symptoms of Listed Potential Problems Will be Absent, Minimized or Managed (Gastrointestinal Bleeding)  Outcome: Ongoing (interventions implemented as appropriate)     Problem: Skin Injury Risk (Adult)  Goal: Skin Health and Integrity  Outcome: Ongoing (interventions implemented as appropriate)     Problem: Patient Care Overview  Goal: Plan of Care Review  Outcome: Ongoing (interventions implemented as appropriate)  Goal: Individualization and Mutuality  Outcome: Ongoing (interventions implemented as appropriate)  Goal: Discharge Needs  Assessment  Outcome: Ongoing (interventions implemented as appropriate)  Goal: Interprofessional Rounds/Family Conf  Outcome: Ongoing (interventions implemented as appropriate)

## 2020-04-06 NOTE — CODE DOCUMENTATION
Spoke with Ajit, , regarding patient condition.  Wishes to remain full code.  He is trying to get a hold of his  to discuss care options.

## 2020-04-06 NOTE — PLAN OF CARE
Problem: Patient Care Overview  Goal: Plan of Care Review  Outcome: Ongoing (interventions implemented as appropriate)  Flowsheets  Taken 4/6/2020 0823 by Aiden Wang RN  Plan of Care Reviewed With: patient  Taken 4/6/2020 1238 by Carla Rossi MS CCC-SLP  Outcome Summary: Clinical swallow eval completed.  Pt with drided blood on lips and in oral cavity.  Pt able to masticate ice chip and swallow w/ no overt s/s aspiration.  Small cup sip of water w/ immediate cough elicited.  No overt s/s nectar or puree.  Pt w/ inconsistent ability to suck from straw.  REC puree with nectar thick liquids, meds in puree as safest possible diet.  Pt to be upright with all po and oral care provided before and after meals.

## 2020-04-06 NOTE — THERAPY EVALUATION
Acute Care - Speech Language Pathology   Swallow Initial Evaluation [facility]     Patient Name: Darby Workman  : 1945  MRN: [id number]  Today's Date: 2020               Admit Date: 3/27/2020    Visit Dx:     ICD-10-CM ICD-9-CM   1. Upper GI bleed K92.2 578.9   2. Acute renal failure, unspecified acute renal failure type (CMS/HCC) N17.9 584.9   3. Elevated LFTs R94.5 790.6   4. Hyperkalemia E87.5 276.7     Patient Active Problem List   Diagnosis   • Hypertensive crisis   • Diabetes mellitus (CMS/[facility])   • Hyperlipidemia   • Hypertension   • Elevated troponin   • Acute cerebrovascular accident (CVA) of cerebellum (CMS/[facility])   • Right-sided headache   • Acute ischemic stroke (CMS/[facility])   • Embolic stroke (CMS/[facility])   • Anticoagulated by anticoagulation treatment   • Stroke (cerebrum) (CMS/[facility])   • Dysthymic disorder   • Hypotension   • Upper GI bleed   • Uremic encephalopathy   • Acute kidney injury (CMS/[facility])   • Acute pancreatitis   • Hemorrhagic shock (CMS/[facility])   • Thrombocytopenia (CMS/[facility])   • Type 2 diabetes mellitus with hyperglycemia (CMS/[facility])   • Dementia without behavioral disturbance (CMS/[facility])   • Acute posthemorrhagic anemia     Past Medical History:   Diagnosis Date   • Acute cerebrovascular accident (CVA) of cerebellum (CMS/[facility])    • Acute ischemic stroke (CMS/[facility])    • Arthritis    • Coronary artery disease    • CVA (cerebral vascular accident) (CMS/[facility])    • Diabetes mellitus (CMS/[facility])    • Heart attack (CMS/[facility])    • History of blood clots    • Hyperlipidemia    • Hypertension    • Vision loss      Past Surgical History:   Procedure Laterality Date   • CAROTID ENDARTERECTOMY Left 2019    Procedure: Left carotid endarterectomy;  Surgeon: Rupert Oliva MD;  Location: [facility] OR;  Service: Neurosurgery   • EMBOLECTOMY Left 2019    Procedure: CEREBRAL ANGIOGRAM, LEFT INTERNAL CAROTID ARTERY STENT;  Surgeon: Rueprt Oliva MD;  Location: [facility] OR ;  Service:  Neurosurgery   • ENDOSCOPY N/A 3/28/2020    Procedure: ESOPHAGOGASTRODUODENOSCOPY AT BEDSIDE WITH EPI INJECTION AND GOLD PROBE CAUTERIZATION;  Surgeon: Malathi Bright MD;  Location: [facility] ENDOSCOPY;  Service: Gastroenterology;  Laterality: N/A;  PRE GI BLEED  POST EROSIVE GASTRITIS, DUODENAL ULCER WITH CLOT        SWALLOW EVALUATION (last 72 hours)      SLP Adult Swallow Evaluation     Row Name 04/06/20 1200                   Rehab Evaluation    Document Type  evaluation  -SA        Subjective Information  no complaints  -SA        Patient Observations  alert;cooperative  -SA        Patient Effort  adequate  -SA        Symptoms Noted During/After Treatment  none  -SA           General Information    Patient Profile Reviewed  yes  -SA        Pertinent History Of Current Problem  UGI bleed; h/o CVA, CAD  -SA        Current Method of Nutrition  clear liquids  -SA        Precautions/Limitations, Vision  WFL;for purposes of eval  -SA        Precautions/Limitations, Hearing  WFL;for purposes of eval  -SA        Prior Level of Function-Communication  other (see comments) largely unintelligible d/t confusion/dried blood oral cavity  -SA        Prior Level of Function-Swallowing  other (see comments) unknown  -SA        Plans/Goals Discussed with  patient  -SA        Barriers to Rehab  cognitive status  -SA        Patient's Goals for Discharge  patient did not state  -SA           Clinical Swallow Eval    Clinical Swallow Evaluation Summary  Clinical swallow eval completed.  Pt with drided blood on lips and in oral cavity.  Pt able to masticate ice chip and swallow w/ no overt s/s aspiration.  Small cup sip of water w/ immediate cough elicited.  No overt s/s nectar or puree.  Pt w/ inconsistent ability to suck from straw.  REC puree with nectar thick liquids, meds in puree as safest possible diet.  Pt to be upright with all po and oral care provided before and after meals.    -SA           Clinical Impression    SLP  Swallowing Diagnosis  mild-moderate  -        Functional Impact  risk of aspiration/pneumonia  -        Rehab Potential/Prognosis, Swallowing  good, to achieve stated therapy goals  -        Swallow Criteria for Skilled Therapeutic Interventions Met  demonstrates skilled criteria  -           Recommendations    Therapy Frequency (Swallow)  PRN  -        Predicted Duration Therapy Intervention (Days)  until discharge  -        SLP Diet Recommendation  puree;nectar thick liquids;other (see comments) cardiac per RN  -        Recommended Diagnostics  reassess via clinical swallow evaluation  -        Recommended Precautions and Strategies  upright posture during/after eating;small bites of food and sips of liquid  -SA        SLP Rec. for Method of Medication Administration  with pudding or applesauce  -        Monitor for Signs of Aspiration  notify SLP if any concerns  -        Anticipated Dischage Disposition  unknown  -           Swallow Goals (SLP)    Oral Nutrition/Hydration Goal Selection (SLP)  oral nutrition/hydration, SLP goal 1  -           Oral Nutrition/Hydration Goal 1 (SLP)    Oral Nutrition/Hydration Goal 1, SLP  Pt will tolerate least restrictive diet  -        Time Frame (Oral Nutrition/Hydration Goal 1, SLP)  by discharge  -          User Key  (r) = Recorded By, (t) = Taken By, (c) = Cosigned By    Initials Name Effective Dates     Carla Rossi MS [facility]-SLP 03/07/18 -           EDUCATION  The patient has been educated in the following areas:   Dysphagia (Swallowing Impairment) Oral Care/Hydration Modified Diet Instruction.    SLP Recommendation and Plan  SLP Swallowing Diagnosis: mild-moderate  SLP Diet Recommendation: puree, nectar thick liquids, other (see comments)(cardiac per RN)  Recommended Precautions and Strategies: upright posture during/after eating, small bites of food and sips of liquid  SLP Rec. for Method of Medication Administration: with pudding or  applesauce     Monitor for Signs of Aspiration: notify SLP if any concerns  Recommended Diagnostics: reassess via clinical swallow evaluation  Swallow Criteria for Skilled Therapeutic Interventions Met: demonstrates skilled criteria  Anticipated Dischage Disposition: unknown  Rehab Potential/Prognosis, Swallowing: good, to achieve stated therapy goals  Therapy Frequency (Swallow): PRN  Predicted Duration Therapy Intervention (Days): until discharge       Outcome Summary: Clinical swallow eval completed.  Pt with drided blood on lips and in oral cavity.  Pt able to masticate ice chip and swallow w/ no overt s/s aspiration.  Small cup sip of water w/ immediate cough elicited.  No overt s/s nectar or puree.  Pt w/ inconsistent ability to suck from straw.  REC puree with nectar thick liquids, meds in puree as safest possible diet.  Pt to be upright with all po and oral care provided before and after meals.    SLP GOALS     Row Name 04/06/20 1200             Oral Nutrition/Hydration Goal 1 (SLP)    Oral Nutrition/Hydration Goal 1, SLP  Pt will tolerate least restrictive diet  -      Time Frame (Oral Nutrition/Hydration Goal 1, SLP)  by discharge  -        User Key  (r) = Recorded By, (t) = Taken By, (c) = Cosigned By    Initials Name Provider Type    Carla Yoo MS CCC-SLP Speech and Language Pathologist           SLP Outcome Measures (last 72 hours)      SLP Outcome Measures     Row Name 04/06/20 1200             SLP Outcome Measures    Outcome Measure Used?  Adult NOMS  -SA         Adult FCM Scores    FCM Chosen  Swallowing  -      Swallowing FCM Score  4  -        User Key  (r) = Recorded By, (t) = Taken By, (c) = Cosigned By    Initials Name Effective Dates    Carla Yoo MS CCC-SLP 03/07/18 -            Time Calculation:   Time Calculation- SLP     Row Name 04/06/20 1239             Time Calculation- SLP    SLP Start Time  1030  -SA      SLP Received On  04/06/20  -        User Key  (r) =  Recorded By, (t) = Taken By, (c) = Cosigned By    Initials Name Provider Type     Carla Rossi MS CCC-SLP Speech and Language Pathologist          Therapy Charges for Today     Code Description Service Date Service Provider Modifiers Qty    [id number]  ST EVAL ORAL PHARYNG SWALLOW 4 4/6/2020 Carla Rossi MS CCC-SLP GN 1               MS BRANDON Wheatley  4/6/2020

## 2020-04-06 NOTE — NURSING NOTE
Called GETACHEW Gibson, to make her aware of pt's HR still ranging from 118 (lowest seen by RN)-140's. She stated that it was better than before d/t HR being 130-150's. She stated to let the digoxin work for a couple more hours and call back if HR is still 110-120's. Last manual BP was 110/60, APRN is aware. Will continue to monitor.

## 2020-04-06 NOTE — PLAN OF CARE
"  Problem: Patient Care Overview  Goal: Plan of Care Review  Outcome: Ongoing (interventions implemented as appropriate)  Flowsheets (Taken 4/6/2020 0609)  Progress: no change  Plan of Care Reviewed With: patient  Outcome Summary: When pt is asked if she has pain she states \"no\". No signs present of pain throughout shift. Pt is alert, opening her eyes. Pt tries to speak, speech is garbled. No BM his shift. No oral bleeding. Restraints continued, pt reaches for lines when RN performs ROM with wrists. Turned Q2 hours. Protonix drip continued. Cardizem drip infusing, titrated per orders. HR dropped to 55 when Cardizem drip with increased to 10mg/h, RN decreased drip to 5mg/h. HR ranging from high 90's to 120's, will jump into 130's but will not stay long. BP is stable, manual BPs throughout shift. 1L O2, sating high-90's. Sats dropped to high 70's when on roomair. Rest of VSS. Waffle boots in place. Oral care provided when pt would allow. Pt would turn away from RN when completing oral care. No s/s of distress at this time. Will continue to monitor.      Goal: Individualization and Mutuality  Outcome: Ongoing (interventions implemented as appropriate)  Goal: Discharge Needs Assessment  Outcome: Ongoing (interventions implemented as appropriate)  Goal: Interprofessional Rounds/Family Conf  Outcome: Ongoing (interventions implemented as appropriate)       Problem: Fall Risk (Adult)  Goal: Absence of Fall  Outcome: Ongoing (interventions implemented as appropriate)     Problem: Restraint, Nonbehavioral (Nonviolent)  Goal: Rationale and Justification  Outcome: Ongoing (interventions implemented as appropriate)  Goal: Nonbehavioral (Nonviolent) Restraint: Absence of Injury/Harm  Outcome: Ongoing (interventions implemented as appropriate)  Goal: Nonbehavioral (Nonviolent) Restraint: Achievement of Discontinuation Criteria  Outcome: Ongoing (interventions implemented as appropriate)  Goal: Nonbehavioral (Nonviolent) " Restraint: Preservation of Dignity and Wellbeing  Outcome: Ongoing (interventions implemented as appropriate)     Problem: Renal Failure/Kidney Injury, Acute (Adult)  Goal: Signs and Symptoms of Listed Potential Problems Will be Absent, Minimized or Managed (Renal Failure/Kidney Injury, Acute)  Outcome: Ongoing (interventions implemented as appropriate)     Problem: Pain, Acute (Adult)  Goal: Acceptable Pain Control/Comfort Level  Outcome: Ongoing (interventions implemented as appropriate)     Problem: Gastrointestinal Bleeding (Adult)  Goal: Signs and Symptoms of Listed Potential Problems Will be Absent, Minimized or Managed (Gastrointestinal Bleeding)  Outcome: Ongoing (interventions implemented as appropriate)     Problem: Skin Injury Risk (Adult)  Goal: Skin Health and Integrity  Outcome: Ongoing (interventions implemented as appropriate)

## 2020-04-06 NOTE — NURSING NOTE
Called and spoke with GETACHEW Simental, with Cardiology about pt's HR dropping to 55 when Cardizem drip was increased to 10mg/h. RN explained that drip was decreased back to 5mg/h to see if pt could tolerate this dose. HR dropped to 51 again when drip was infusing at 5mg/h. GETACHEW Marinelli stated to continue the drip at 5mg/h and if pt's HR drops again to stop the drip. She stated that it would be okay for her to run tachy until they see the pt today. RN stated that pt's BP was 122/74, APRN aware.

## 2020-04-06 NOTE — PROGRESS NOTES
"Patient Name: Darby Workman  :1945  75 y.o.      Patient Care Team:  Eric Matta MD as PCP - General (General Practice)    Interval History:   A fib with RVR    Subjective:  HR is elevated. Not terrible    Objective   Vital Signs  Temp:  [97.6 °F (36.4 °C)-98.7 °F (37.1 °C)] 97.6 °F (36.4 °C)  Heart Rate:  [] 114  Resp:  [14-17] 16  BP: ()/(36-93) 126/84    Intake/Output Summary (Last 24 hours) at 2020 1017  Last data filed at 2020 0526  Gross per 24 hour   Intake --   Output 1075 ml   Net -1075 ml     Flowsheet Rows      First Filed Value   Admission Height  165.1 cm (65\") Documented at 2020 1029   Admission Weight  60.3 kg (133 lb) Documented at 2020 1029        No exam done secondary to Covid    Results Review:    Results from last 7 days   Lab Units 20  0525   SODIUM mmol/L 146*   POTASSIUM mmol/L 3.3*   CHLORIDE mmol/L 109*   CO2 mmol/L 22.4   BUN mg/dL 47*   CREATININE mg/dL 1.67*   GLUCOSE mg/dL 180*   CALCIUM mg/dL 8.5*     Results from last 7 days   Lab Units 20  2304   TROPONIN T ng/mL 0.023     Results from last 7 days   Lab Units 20  0525   WBC 10*3/mm3 8.40   HEMOGLOBIN g/dL 10.1*   HEMATOCRIT % 31.1*   PLATELETS 10*3/mm3 175     Results from last 7 days   Lab Units 20  0509 20  0517 20  0534  20  1536   INR  1.35* 1.68* 1.69*   < > 1.62*   APTT seconds 37.5*  --   --   --  46.6*    < > = values in this interval not displayed.         Results from last 7 days   Lab Units 20  0525   MAGNESIUM mg/dL 1.8             Medication Review:     insulin lispro 0-7 Units Subcutaneous 4x Daily With Meals & Nightly   pantoprazole 40 mg Intravenous BID AC          dilTIAZem 5-15 mg/hr Last Rate: 5 mg/hr (20 0947)       Assessment/Plan     1.  Atrial fibrillation.  No anticoagulation due to GI bleed.  In and out of atrial fibrillation.  Currently in sinus rhythm at 68 bpm.  On 5 mg of IV diltiazem.  Cannot swallow.  "  is going to speak with his [employer] leaders today to try to decide on her CODE STATUS.  She is demented.  She is pulling out any sorts of tubes.  Increase diltiazem if she goes back into A. fib with RVR.  2.  Hypertension.  3.  Upper GI bleed on oral bleeding.  EGD showed a duodenal bulb ulcer and erosive gastritis.  4.  Acute blood loss anemia  5.  Metabolic encephalopathy on top of history of dementia  6.  Acute renal failure  7.  Thrombocytopenia    -Replace electrolyte with dialysis which is planned for today.  -Continue diltiazem drip.    -No anticoagulation  -Currently full code but discussions will happen with the  later today and hopefully she can be transitioned to palliative which seems to be the most appropriate.    Veronika Perez MD, [facility]  04/06/20  10:17

## 2020-04-06 NOTE — NURSING NOTE
Called and spoke with GETACHEW Simental, about pt's HR ranging from 108-120's but will jump to 130/140's. Pt mainly staying in 120's. BP is 110/70 at this time, GETACHEW is aware. New order for Cardizem drip. Monitor and hold for SBP of 100. Will monitor.

## 2020-04-06 NOTE — NURSING NOTE
Spoke to , Ajit and daughter, Katerine on the phone and they verbalized to make her a DNR because they did not want to put her through the CPR process.  They will call back tomorrow to say whether or not make her Palliative.

## 2020-04-06 NOTE — PROGRESS NOTES
Name: Darby Workman ADMIT: 3/27/2020   : 1945  PCP: Eric Matta MD    MRN: [id number] LOS: 10 days   AGE/SEX: 75 y.o. female  ROOM: [facility]/   Subjective   Chief Complaint   Patient presents with   • Altered Mental Status   • Black or Bloody Stool     RVR developed again overnight. BP low. Cardene stopped and then placed back on diltiazem. She had bradycardia then and diltiazem was decreased.    Mentation is better today. She is aphasic but can say no/yes at times. When she tried to talk further, she could not form words and was tearful with this. She is in restraints because she was pulling at lines overnight.     was contacted last night. No change in goals of care but he is discussing with family and  today at about 4PM.    Objective   Vital Signs  Temp:  [97.6 °F (36.4 °C)-98.7 °F (37.1 °C)] 97.6 °F (36.4 °C)  Heart Rate:  [] 114  Resp:  [14-17] 16  BP: ()/(36-93) 126/84  SpO2:  [94 %-99 %] 97 %  on  Flow (L/min):  [1-2] 1;   Device (Oxygen Therapy): nasal cannula  Body mass index is 22.64 kg/m².    Physical Exam   Constitutional: She appears well-developed.   frail   HENT:   Head: Atraumatic.   Nose: Nose normal.   Dried blood on lips lower mucosa.   Eyes: Pupils are equal, round, and reactive to light. Right eye exhibits no discharge. Left eye exhibits no discharge.   Neck: Neck supple. No tracheal deviation present.   Cardiovascular: Normal rate and regular rhythm.   Pulmonary/Chest: Effort normal. She has decreased breath sounds. She has no wheezes.   Abdominal: Soft. She exhibits no distension. There is no tenderness.   Musculoskeletal: She exhibits no edema or tenderness.   Neurological: She is alert.   Follows simple commands, will open mouth and move fingers when asked.  Aphasic speech   Skin: Skin is dry. She is not diaphoretic.   Some petichia present over upper chest. Did not see any purpura   Psychiatric: Cognition and memory are impaired.   Nursing note and  vitals reviewed.      Results Review:       I reviewed the patient's new clinical results.    Results from last 7 days   Lab Units 04/06/20  0525 04/06/20  0010 04/05/20  1609 04/05/20  0510  04/04/20  0546 04/03/20  0517   WBC 10*3/mm3 8.40  --   --  8.70  --  9.15 11.08*   HEMOGLOBIN g/dL 10.1* 9.8* 9.8* 9.3*   < > 9.2* 7.5*   PLATELETS 10*3/mm3 175  --   --  127*  --  102* 88*    < > = values in this interval not displayed.     Results from last 7 days   Lab Units 04/06/20  0525 04/05/20  0509 04/04/20  0546 04/03/20  0517   SODIUM mmol/L 146* 142 138 145   POTASSIUM mmol/L 3.3* 3.4* 3.7 3.6   CHLORIDE mmol/L 109* 103 100 101   CO2 mmol/L 22.4 24.3 23.2 24.1   BUN mg/dL 47* 51* 42* 84*   CREATININE mg/dL 1.67* 2.11* 2.06* 3.65*   GLUCOSE mg/dL 180* 167* 153* 154*   Estimated Creatinine Clearance: 27.9 mL/min (A) (by C-G formula based on SCr of 1.67 mg/dL (H)).  Results from last 7 days   Lab Units 04/06/20  0525 04/05/20  0509 04/04/20  0546 04/03/20  0517  04/02/20  0534   CALCIUM mg/dL 8.5* 8.3* 8.2* 8.1*   < > 7.6*   ALBUMIN g/dL 2.50* 2.40* 2.30* 2.30*   < > 2.30*   MAGNESIUM mg/dL 1.8 1.9 1.9  --   --  2.1   PHOSPHORUS mg/dL 3.2 3.9  --  4.9*  --   --     < > = values in this interval not displayed.         insulin lispro 0-7 Units Subcutaneous 4x Daily With Meals & Nightly       dilTIAZem 5-15 mg/hr Last Rate: 5 mg/hr (04/06/20 0913)   niCARdipine 5-15 mg/hr Last Rate: Stopped (04/05/20 1947)   pantoprazole 8 mg/hr Last Rate: 8 mg/hr (04/06/20 0530)   Diet Clear Liquid    Assessment/Plan      Active Hospital Problems    Diagnosis  POA   • **Upper GI bleed [K92.2]  Yes   • Uremic encephalopathy [G93.41, N19]  Yes   • Acute kidney injury (CMS/HCC) [N17.9]  Yes   • Acute pancreatitis [K85.90]  Yes   • Hemorrhagic shock (CMS/HCC) [R57.8]  Yes   • Thrombocytopenia (CMS/HCC) [D69.6]  Yes   • Type 2 diabetes mellitus with hyperglycemia (CMS/HCC) [E11.65]  Yes   • Dementia without behavioral disturbance (CMS/HCC)  [F03.90]  Yes   • Acute posthemorrhagic anemia [D62]  Yes   • Hypertension [I10]  Yes   • Hyperlipidemia [E78.5]  Yes      Resolved Hospital Problems   No resolved problems to display.       · GIB/Hemorrhagic Shock: Required transfusion, kcentra. Duodenal Ulcer and erosive gastritis on EGD. Anticoagulation held. Also has oral component of blood loss as well. GI, Surgery, and Oncology evaluated. All have recommended palliative care given her poor prognosis and multiorgan failure. Has continued oral oozing and had addiitonal rectal bleed few days ago. Hgb is stable. Will change to BID IV protonix.   · CVA: Left MCA CVA. Subacute based on CT and aphasia. Neurology evaluated. She is not AC or antiplatelet candidate at this time. She had this even while she was on Eliquis and aspirin prior to admission. Aphasic speech but is a bit more interactive today. Will ask SLP to see in morning. If not able to tolerate any PO intake and goals of care do not change. Will need to undergo nutrition consult and eval for PEG.  · Dementia  · Leukocytosis: Reactive in nature. No antibiotics indicated. Infectious Disease evaluated.  · AFib: RVR again but rate improved now. Cardiology following.  · HTN: Off nicardipine now due to hypotension with rvr overnight. BP improved currently.  · TCP: See above  · KIERAN: Dialysis intiated. Nephrology following.  · Disposition: Poor prognosis. GOC update this afternoon is expected. If she remains full code then will need to proceed with SLP and nutrition evaluations.    Chandana Kapoor MD  [facility]  04/06/20  10:03    Dictated portions using Dragon dictation software.

## 2020-04-06 NOTE — NURSING NOTE
Called GETACHEW Simental, about pt's HR still ranging from 112-140's. She ordered a 1x dose of IV lopressor. I explained that according to Dr. Avila's note from 4/5/2020, he does not want pt to have beta-blocker. She states that this is because pt was bradycardic. GETACHEW Marinelli, stated to call back in 1 hour after medication is administered if HR is still elevated.

## 2020-04-07 LAB
ALBUMIN SERPL-MCNC: 2.7 G/DL (ref 3.5–5.2)
ALBUMIN/GLOB SERPL: 1 G/DL
ALP SERPL-CCNC: 341 U/L (ref 39–117)
ALT SERPL W P-5'-P-CCNC: 31 U/L (ref 1–33)
ANION GAP SERPL CALCULATED.3IONS-SCNC: 14 MMOL/L (ref 5–15)
AST SERPL-CCNC: 31 U/L (ref 1–32)
BASOPHILS # BLD AUTO: 0.01 10*3/MM3 (ref 0–0.2)
BASOPHILS NFR BLD AUTO: 0.1 % (ref 0–1.5)
BH BB BLOOD EXPIRATION DATE: NORMAL
BH BB BLOOD TYPE BARCODE: 7300
BH BB DISPENSE STATUS: NORMAL
BH BB PRODUCT CODE: NORMAL
BH BB UNIT NUMBER: NORMAL
BILIRUB SERPL-MCNC: 1.8 MG/DL (ref 0.2–1.2)
BUN BLD-MCNC: 27 MG/DL (ref 8–23)
BUN/CREAT SERPL: 20 (ref 7–25)
CALCIUM SPEC-SCNC: 8.2 MG/DL (ref 8.6–10.5)
CHLORIDE SERPL-SCNC: 101 MMOL/L (ref 98–107)
CO2 SERPL-SCNC: 24 MMOL/L (ref 22–29)
CREAT BLD-MCNC: 1.35 MG/DL (ref 0.57–1)
CROSSMATCH INTERPRETATION: NORMAL
DEPRECATED RDW RBC AUTO: 45.4 FL (ref 37–54)
EOSINOPHIL # BLD AUTO: 0.17 10*3/MM3 (ref 0–0.4)
EOSINOPHIL NFR BLD AUTO: 2.4 % (ref 0.3–6.2)
ERYTHROCYTE [DISTWIDTH] IN BLOOD BY AUTOMATED COUNT: 14.2 % (ref 12.3–15.4)
GFR SERPL CREATININE-BSD FRML MDRD: 38 ML/MIN/1.73
GLOBULIN UR ELPH-MCNC: 2.6 GM/DL
GLUCOSE BLD-MCNC: 150 MG/DL (ref 65–99)
GLUCOSE BLDC GLUCOMTR-MCNC: 132 MG/DL (ref 70–130)
GLUCOSE BLDC GLUCOMTR-MCNC: 145 MG/DL (ref 70–130)
GLUCOSE BLDC GLUCOMTR-MCNC: 146 MG/DL (ref 70–130)
GLUCOSE BLDC GLUCOMTR-MCNC: 147 MG/DL (ref 70–130)
GLUCOSE BLDC GLUCOMTR-MCNC: 172 MG/DL (ref 70–130)
HCT VFR BLD AUTO: 29.5 % (ref 34–46.6)
HGB BLD-MCNC: 9.7 G/DL (ref 12–15.9)
IMM GRANULOCYTES # BLD AUTO: 0.04 10*3/MM3 (ref 0–0.05)
IMM GRANULOCYTES NFR BLD AUTO: 0.6 % (ref 0–0.5)
LYMPHOCYTES # BLD AUTO: 0.44 10*3/MM3 (ref 0.7–3.1)
LYMPHOCYTES NFR BLD AUTO: 6.2 % (ref 19.6–45.3)
MAGNESIUM SERPL-MCNC: 1.7 MG/DL (ref 1.6–2.4)
MCH RBC QN AUTO: 29.1 PG (ref 26.6–33)
MCHC RBC AUTO-ENTMCNC: 32.9 G/DL (ref 31.5–35.7)
MCV RBC AUTO: 88.6 FL (ref 79–97)
MONOCYTES # BLD AUTO: 0.39 10*3/MM3 (ref 0.1–0.9)
MONOCYTES NFR BLD AUTO: 5.5 % (ref 5–12)
NEUTROPHILS # BLD AUTO: 6.05 10*3/MM3 (ref 1.7–7)
NEUTROPHILS NFR BLD AUTO: 85.2 % (ref 42.7–76)
NRBC BLD AUTO-RTO: 0 /100 WBC (ref 0–0.2)
PHOSPHATE SERPL-MCNC: 2.9 MG/DL (ref 2.5–4.5)
PLATELET # BLD AUTO: 172 10*3/MM3 (ref 140–450)
PMV BLD AUTO: 10.9 FL (ref 6–12)
POTASSIUM BLD-SCNC: 3.3 MMOL/L (ref 3.5–5.2)
PROT SERPL-MCNC: 5.3 G/DL (ref 6–8.5)
RBC # BLD AUTO: 3.33 10*6/MM3 (ref 3.77–5.28)
SODIUM BLD-SCNC: 139 MMOL/L (ref 136–145)
UNIT  ABO: NORMAL
UNIT  RH: NORMAL
WBC NRBC COR # BLD: 7.1 10*3/MM3 (ref 3.4–10.8)

## 2020-04-07 PROCEDURE — 84100 ASSAY OF PHOSPHORUS: CPT | Performed by: INTERNAL MEDICINE

## 2020-04-07 PROCEDURE — 25010000002 FUROSEMIDE PER 20 MG: Performed by: INTERNAL MEDICINE

## 2020-04-07 PROCEDURE — 85025 COMPLETE CBC W/AUTO DIFF WBC: CPT | Performed by: INTERNAL MEDICINE

## 2020-04-07 PROCEDURE — 99232 SBSQ HOSP IP/OBS MODERATE 35: CPT | Performed by: INTERNAL MEDICINE

## 2020-04-07 PROCEDURE — 82962 GLUCOSE BLOOD TEST: CPT

## 2020-04-07 PROCEDURE — 80053 COMPREHEN METABOLIC PANEL: CPT | Performed by: INTERNAL MEDICINE

## 2020-04-07 PROCEDURE — 63710000001 INSULIN LISPRO (HUMAN) PER 5 UNITS: Performed by: INTERNAL MEDICINE

## 2020-04-07 PROCEDURE — 83735 ASSAY OF MAGNESIUM: CPT | Performed by: NURSE PRACTITIONER

## 2020-04-07 RX ORDER — POTASSIUM CHLORIDE 1.5 G/1.77G
40 POWDER, FOR SOLUTION ORAL ONCE
Status: COMPLETED | OUTPATIENT
Start: 2020-04-07 | End: 2020-04-07

## 2020-04-07 RX ORDER — FUROSEMIDE 10 MG/ML
80 INJECTION INTRAMUSCULAR; INTRAVENOUS EVERY 12 HOURS
Status: COMPLETED | OUTPATIENT
Start: 2020-04-07 | End: 2020-04-08

## 2020-04-07 RX ADMIN — FUROSEMIDE 80 MG: 10 INJECTION, SOLUTION INTRAMUSCULAR; INTRAVENOUS at 16:17

## 2020-04-07 RX ADMIN — SODIUM CHLORIDE 5 MG/HR: 900 INJECTION, SOLUTION INTRAVENOUS at 16:16

## 2020-04-07 RX ADMIN — POTASSIUM CHLORIDE 40 MEQ: 1.5 POWDER, FOR SOLUTION ORAL at 16:16

## 2020-04-07 RX ADMIN — PANTOPRAZOLE SODIUM 40 MG: 40 INJECTION, POWDER, FOR SOLUTION INTRAVENOUS at 06:55

## 2020-04-07 RX ADMIN — PANTOPRAZOLE SODIUM 40 MG: 40 INJECTION, POWDER, FOR SOLUTION INTRAVENOUS at 18:19

## 2020-04-07 RX ADMIN — INSULIN LISPRO 2 UNITS: 100 INJECTION, SOLUTION INTRAVENOUS; SUBCUTANEOUS at 13:33

## 2020-04-07 NOTE — PROGRESS NOTES
Adult Nutrition  Assessment/PES    Patient Name:  Darby Workman  YOB: 1945  MRN: [id number]  Admit Date:  3/27/2020    Assessment Date:  4/7/2020    Comments:  Nutrition follow up.  S/p HD yesterday.  S/p SLP evaluation yesterday.  Patient on pureed diet with NTL.  Total feed.  No PO data available per chart review.    Patient continues with restraints.  Demented.  Still with blood per mouth per chart review.    Adding Boost pudding TID to promote kcal and protein intake.    Patient is now DNR.  Plans for family to reconsider palliative care, they are going to speak with their  first.    RD to continue to follow.     RD reviewed EMR.  Currently working remotely d/t COVID pandemic.  Can be reached at [phone].    Reason for Assessment     Row Name 04/07/20 1436          Reason for Assessment    Reason For Assessment  follow-up protocol         Nutrition/Diet History     Row Name 04/07/20 1436          Nutrition/Diet History    Typical Food/Fluid Intake  now on pureed diet with NTL, total feed         Anthropometrics     Row Name 04/07/20 1436          Admit Weight    Admit Weight  -- 128# 4/7        Body Mass Index (BMI)    BMI Assessment  BMI 18.5-24.9: normal         Labs/Tests/Procedures/Meds     Row Name 04/07/20 1436          Labs/Procedures/Meds    Lab Results Reviewed  reviewed, pertinent     Lab Results Comments  Gluc, Na, K, BUN, Creat, Alb        Diagnostic Tests/Procedures    Diagnostic Test/Procedure Reviewed  reviewed, pertinent     Diagnostic Test/Procedures Comments  s/p HD yesterday; s/p SLP yesterday        Medications    Pertinent Medications Reviewed  reviewed, pertinent     Pertinent Medications Comments  insulin, protonix, KCl, cardizem drip         Physical Findings     Row Name 04/07/20 1438          Physical Findings    Oral/Mouth Cavity  bleeding dried blood around mouth     Skin  -- B=9, bruised         Nutrition Prescription Ordered     Row Name 04/07/20 1438           Nutrition Prescription PO    Current PO Diet  Pureed     Fluid Consistency  Nectar/syrup thick     Common Modifiers  Cardiac         Evaluation of Received Nutrient/Fluid Intake     Row Name 04/07/20 1439          PO Evaluation    Number of Days PO Intake Evaluated  Insufficient Data     % PO Intake  -- total feed         Problem/Interventions:    Intervention Goal     Row Name 04/07/20 1439          Intervention Goal    General  Maintain nutrition;Reduce/improve symptoms;Palliative Care;Disease management/therapy family re-considering palliative, to discuss with their      PO  Establish PO;Tolerate PO;PO intake (%)     PO Intake %  75 %     Weight  Maintain weight         Nutrition Intervention     Row Name 04/07/20 1439          Nutrition Intervention    RD/Tech Action  Follow Tx progress;Care plan reviewd;Recommend/ordered     Recommended/Ordered  Supplement         Nutrition Prescription     Row Name 04/07/20 1440          Nutrition Prescription PO    PO Prescription  Begin/change supplement     Supplement  Boost Pudding     Supplement Frequency  3 times a day     New PO Prescription Ordered?  Yes         Education/Evaluation     Row Name 04/07/20 1440          Education    Education  Will Instruct as appropriate        Monitor/Evaluation    Monitor  Per protocol;PO intake;Supplement intake;Pertinent labs;Weight;Skin status;Symptoms           Electronically signed by:  Marialuisa Smith RD  04/07/20 14:40

## 2020-04-07 NOTE — PLAN OF CARE
Pt still in restraints. Dried, crusted blood around lips. Attempted to clean but would break scabs open and bleed. Vaseline applied to lips. RUE swollen, elevated on pillow. Spoke with  and daughter. Plan on palliative sometime this week. VSS. Will cont to monitor pt.      Problem: Fall Risk (Adult)  Goal: Absence of Fall  Outcome: Ongoing (interventions implemented as appropriate)     Problem: Restraint, Nonbehavioral (Nonviolent)  Goal: Rationale and Justification  Outcome: Ongoing (interventions implemented as appropriate)  Goal: Nonbehavioral (Nonviolent) Restraint: Absence of Injury/Harm  Outcome: Ongoing (interventions implemented as appropriate)  Goal: Nonbehavioral (Nonviolent) Restraint: Achievement of Discontinuation Criteria  Outcome: Ongoing (interventions implemented as appropriate)  Goal: Nonbehavioral (Nonviolent) Restraint: Preservation of Dignity and Wellbeing  Outcome: Ongoing (interventions implemented as appropriate)     Problem: Renal Failure/Kidney Injury, Acute (Adult)  Goal: Signs and Symptoms of Listed Potential Problems Will be Absent, Minimized or Managed (Renal Failure/Kidney Injury, Acute)  Outcome: Ongoing (interventions implemented as appropriate)     Problem: Pain, Acute (Adult)  Goal: Acceptable Pain Control/Comfort Level  Outcome: Ongoing (interventions implemented as appropriate)     Problem: Gastrointestinal Bleeding (Adult)  Goal: Signs and Symptoms of Listed Potential Problems Will be Absent, Minimized or Managed (Gastrointestinal Bleeding)  Outcome: Ongoing (interventions implemented as appropriate)     Problem: Skin Injury Risk (Adult)  Goal: Skin Health and Integrity  Outcome: Ongoing (interventions implemented as appropriate)     Problem: Patient Care Overview  Goal: Plan of Care Review  Outcome: Ongoing (interventions implemented as appropriate)  Goal: Individualization and Mutuality  Outcome: Ongoing (interventions implemented as appropriate)  Goal: Discharge Needs  Assessment  Outcome: Ongoing (interventions implemented as appropriate)  Goal: Interprofessional Rounds/Family Conf  Outcome: Ongoing (interventions implemented as appropriate)

## 2020-04-07 NOTE — PROGRESS NOTES
Continued Stay Note  [facility]     Patient Name: Darby Workman  MRN: [id number]  Today's Date: 4/7/2020    Admit Date: 3/27/2020    Discharge Plan     Row Name 04/07/20 1357       Plan    Plan  Plan return to [facility] vs palliative care.  PAT Cortes RN    Patient/Family in Agreement with Plan  yes    Plan Comments  Per Dr. Elder's note family to consider palliative care after discussion with their .   CCP continues to follow for discharge needs.  Plan return to [facility] vs Palliative Care.   PAT Cortes RN        Discharge Codes    No documentation.             Dorothy Cortes, RN

## 2020-04-07 NOTE — PLAN OF CARE
Problem: Patient Care Overview  Goal: Plan of Care Review  Outcome: Ongoing (interventions implemented as appropriate)  Flowsheets (Taken 4/7/2020 9562)  Progress: no change  Plan of Care Reviewed With: patient  Outcome Summary: No signs of pain, SOA, or nausea present throughout shift. Pt is alert and will make eye contact with staff. Medications administered per orders. Cardizem drip infusing, HR stable, maintaining 65-75. Restraints continued d/t pt trying to pull at lines, no s/s of distress. Turned Q2 hours. Oral care complete as allowed per pt, pulling and turning from staff with swabs. Catheter care complete, tolerated well. Rest of VSS. No s/s of distress at this time. Will continue to monitor.    Goal: Individualization and Mutuality  Outcome: Ongoing (interventions implemented as appropriate)  Goal: Discharge Needs Assessment  Outcome: Ongoing (interventions implemented as appropriate)  Goal: Interprofessional Rounds/Family Conf  Outcome: Ongoing (interventions implemented as appropriate)     Problem: Skin Injury Risk (Adult)  Goal: Skin Health and Integrity  Outcome: Ongoing (interventions implemented as appropriate)     Problem: Gastrointestinal Bleeding (Adult)  Goal: Signs and Symptoms of Listed Potential Problems Will be Absent, Minimized or Managed (Gastrointestinal Bleeding)  Outcome: Ongoing (interventions implemented as appropriate)     Problem: Pain, Acute (Adult)  Goal: Acceptable Pain Control/Comfort Level  Outcome: Ongoing (interventions implemented as appropriate)     Problem: Renal Failure/Kidney Injury, Acute (Adult)  Goal: Signs and Symptoms of Listed Potential Problems Will be Absent, Minimized or Managed (Renal Failure/Kidney Injury, Acute)  Outcome: Ongoing (interventions implemented as appropriate)     Problem: Restraint, Nonbehavioral (Nonviolent)  Goal: Rationale and Justification  Outcome: Ongoing (interventions implemented as appropriate)  Goal: Nonbehavioral (Nonviolent)  Restraint: Absence of Injury/Harm  Outcome: Ongoing (interventions implemented as appropriate)  Goal: Nonbehavioral (Nonviolent) Restraint: Achievement of Discontinuation Criteria  Outcome: Ongoing (interventions implemented as appropriate)  Goal: Nonbehavioral (Nonviolent) Restraint: Preservation of Dignity and Wellbeing  Outcome: Ongoing (interventions implemented as appropriate)     Problem: Fall Risk (Adult)  Goal: Absence of Fall  Outcome: Ongoing (interventions implemented as appropriate)

## 2020-04-07 NOTE — PROGRESS NOTES
"   LOS: 11 days   Patient Care Team:  Eric Matta MD as PCP - General (General Practice)    Chief Complaint: none    Subjective     Pt denies SOA, pain. Aphasic.      Subjective:  Symptoms:  Stable.  No shortness of breath, chest pain or diarrhea.    Diet:  Poor intake.  No vomiting.    Activity level: Impaired due to weakness.    Pain:  She reports no pain.        History taken from: patient chart RN    Objective     Vital Signs  Temp:  [97.8 °F (36.6 °C)-98.1 °F (36.7 °C)] 97.9 °F (36.6 °C)  Heart Rate:  [63-77] 63  Resp:  [16] 16  BP: (150-168)/(65-83) 168/76  I/O last 3 completed shifts:  In: -   Out: 1175 [Urine:1175]  No intake/output data recorded.        Objective:  General Appearance:  Comfortable, in no acute distress and ill-appearing.    Vital signs: (most recent): Blood pressure 168/76, pulse 63, temperature 97.9 °F (36.6 °C), temperature source Oral, resp. rate 16, height 165.1 cm (65\"), weight 58.2 kg (128 lb 4.9 oz), SpO2 96 %.  Vital signs are normal.  No fever.    Output: Producing urine.    HEENT: (Dried blood around mouth)    Lungs:  Normal effort and normal respiratory rate.  Breath sounds clear to auscultation.  She is not in respiratory distress.  (Anteriorly)  Heart: Normal rate.  Regular rhythm.    Abdomen: Abdomen is soft.  Bowel sounds are normal.   There is no abdominal tenderness.     Extremities: There is no dependent edema.    Pulses: Distal pulses are intact.    Neurological: Patient is alert.  (Aphasic, difficulty following instructions, some mild right sided weakness but difficult to quantify).    Pupils:  Pupils are equal, round, and reactive to light.    Skin:  Warm and dry.              Results Review:     I reviewed the patient's new clinical results.  I reviewed the patient's other test results and agree with the interpretation  I personally viewed and interpreted the patient's EKG/Telemetry data  Discussed with pt and RN  Results from last 7 days   Lab Units 04/07/20  0625 " 04/06/20  1930 04/06/20  0525 04/06/20  0010 04/05/20  1609 04/05/20  0510 04/04/20  2344  04/04/20  0546 04/03/20  0517 04/02/20  2304 04/02/20  0534   WBC 10*3/mm3 7.10  --  8.40  --   --  8.70  --   --  9.15 11.08* 12.27* 13.49*   HEMOGLOBIN g/dL 9.7* 10.6* 10.1* 9.8* 9.8* 9.3* 9.7*   < > 9.2* 7.5* 7.7* 8.1*   PLATELETS 10*3/mm3 172  --  175  --   --  127*  --   --  102* 88* 84* 86*    < > = values in this interval not displayed.     Results from last 7 days   Lab Units 04/07/20  0625 04/06/20  0525 04/05/20  0509 04/04/20  0546 04/03/20  0517 04/02/20  2304 04/02/20  0534   SODIUM mmol/L 139 146* 142 138 145 144 147*   POTASSIUM mmol/L 3.3* 3.3* 3.4* 3.7 3.6 3.5 3.2*   CHLORIDE mmol/L 101 109* 103 100 101 101 102   CO2 mmol/L 24.0 22.4 24.3 23.2 24.1 24.0 25.7   BUN mg/dL 27* 47* 51* 42* 84* 81* 122*   CREATININE mg/dL 1.35* 1.67* 2.11* 2.06* 3.65* 3.75* 5.65*   CALCIUM mg/dL 8.2* 8.5* 8.3* 8.2* 8.1* 7.9* 7.6*   MAGNESIUM mg/dL 1.7 1.8 1.9 1.9  --   --  2.1   PHOSPHORUS mg/dL 2.9 3.2 3.9  --  4.9*  --   --    Estimated Creatinine Clearance: 32.6 mL/min (A) (by C-G formula based on SCr of 1.35 mg/dL (H)).    Medication Review: reviewed    Assessment/Plan       Upper GI bleed    Hyperlipidemia    Hypertension    Uremic encephalopathy    Acute kidney injury (CMS/HCC)    Acute pancreatitis    Hemorrhagic shock (CMS/HCC)    Thrombocytopenia (CMS/HCC)    Type 2 diabetes mellitus with hyperglycemia (CMS/HCC)    Dementia without behavioral disturbance (CMS/HCC)    Acute posthemorrhagic anemia          Plan:   (· GIB/Hemorrhagic Shock/ICU admission: Required transfusion and Kcentra. Duodenal Ulcer and erosive gastritis noted on EGD. Anticoagulation held. Also has oral component of blood loss as well due to erosions of oral mucosa. GI, Surgery, and Oncology evaluated. All have recommended palliative care given her poor prognosis and multiorgan failure. Has continued oral oozing and had addiitonal rectal bleed few days  ago. Hgb is stable today. Changed to BID IV protonix.   · CVA: subacute left MCA CVA. Subacute based on CT and aphasia. Neurology evaluated. She is not AC or antiplatelet candidate at this time. She had this even while she was on Eliquis and aspirin prior to admission. Aphasic speech but is a bit more interactive today. SLP has started modified diet based on their eval 4/6  · Dementia  · Leukocytosis: Reactive in nature. No antibiotics indicated. Infectious Disease has evaluated.  · AFib: on Cardizem gtt and HR stable. Cardiology following.  · HTN: Off nicardipine now due to hypotension with episode of RVR. BP improved currently.  · TCP: resolved  · KIERAN: Dialysis intiated. Nephrology following.  · Disposition: Poor prognosis. Family to consider palliative care after d/w their     All issues new to me today, extensive review of chart).       Ajit Elder MD  04/07/20  12:21    Time: 30min

## 2020-04-07 NOTE — PROGRESS NOTES
"   LOS: 11 days    Patient Care Team:  Eric Matta MD as PCP - General (General Practice)    Chief Complaint:    Chief Complaint   Patient presents with   • Altered Mental Status   • Black or Bloody Stool     KIERAN  Subjective     Interval History:     Makes eye contact and raises eyebrows, but does not follow any commands.   Taking minimal po per RN.  Mouth with dried blood .     Review of Systems:   Unable to obtain    Objective     Vital Signs  Temp:  [97.8 °F (36.6 °C)-98.2 °F (36.8 °C)] 98.2 °F (36.8 °C)  Heart Rate:  [63-77] 76  Resp:  [16] 16  BP: (150-168)/(65-85) 165/85    Flowsheet Rows      First Filed Value   Admission Height  165.1 cm (65\") Documented at 03/27/2020 1029   Admission Weight  60.3 kg (133 lb) Documented at 03/27/2020 1029          I/O this shift:  In: 60 [P.O.:60]  Out: -   I/O last 3 completed shifts:  In: -   Out: 1175 [Urine:1175]    Intake/Output Summary (Last 24 hours) at 4/7/2020 1538  Last data filed at 4/7/2020 0800  Gross per 24 hour   Intake 60 ml   Output 550 ml   Net -490 ml       Physical Exam:  Elderly wf.  Oral mucosa with signif dried blood inside and outside.    Raises eyebrows.  Does not follow commands .  Neck no jvd  Heart RRR No s3 or rub  Lungs clear to auscultation, no wheezing  Abd + bs, soft, no guarding or rebound  Ext 1+ upper and lower ext edema.         Results Review:    Results from last 7 days   Lab Units 04/07/20  0625 04/06/20  0525 04/05/20  0509   SODIUM mmol/L 139 146* 142   POTASSIUM mmol/L 3.3* 3.3* 3.4*   CHLORIDE mmol/L 101 109* 103   CO2 mmol/L 24.0 22.4 24.3   BUN mg/dL 27* 47* 51*   CREATININE mg/dL 1.35* 1.67* 2.11*   CALCIUM mg/dL 8.2* 8.5* 8.3*   BILIRUBIN mg/dL 1.8* 2.2* 2.3*   ALK PHOS U/L 341* 366* 379*   ALT (SGPT) U/L 31 31 36*   AST (SGOT) U/L 31 36* 51*   GLUCOSE mg/dL 150* 180* 167*       Estimated Creatinine Clearance: 32.6 mL/min (A) (by C-G formula based on SCr of 1.35 mg/dL (H)).    Results from last 7 days   Lab Units " 04/07/20  0625 04/06/20  0525 04/05/20  0509   MAGNESIUM mg/dL 1.7 1.8 1.9   PHOSPHORUS mg/dL 2.9 3.2 3.9             Results from last 7 days   Lab Units 04/07/20  0625 04/06/20  1930 04/06/20  0525 04/06/20  0010 04/05/20  1609 04/05/20  0510  04/04/20  0546 04/03/20  0517   WBC 10*3/mm3 7.10  --  8.40  --   --  8.70  --  9.15 11.08*   HEMOGLOBIN g/dL 9.7* 10.6* 10.1* 9.8* 9.8* 9.3*   < > 9.2* 7.5*   PLATELETS 10*3/mm3 172  --  175  --   --  127*  --  102* 88*    < > = values in this interval not displayed.       Results from last 7 days   Lab Units 04/05/20  0509 04/03/20  0517 04/02/20  0534 04/01/20  0520   INR  1.35* 1.68* 1.69* 1.79*         Imaging Results (Last 24 Hours)     ** No results found for the last 24 hours. **          insulin lispro 0-7 Units Subcutaneous 4x Daily With Meals & Nightly   pantoprazole 40 mg Intravenous BID AC   potassium chloride 40 mEq Oral Once       dilTIAZem 5-15 mg/hr Last Rate: 5 mg/hr (04/06/20 1932)       Medication Review:   Current Facility-Administered Medications   Medication Dose Route Frequency Provider Last Rate Last Dose   • acetaminophen (TYLENOL) tablet 650 mg  650 mg Oral Q4H PRN Boogie Saavedra MD        Or   • acetaminophen (TYLENOL) suppository 650 mg  650 mg Rectal Q4H PRN Boogie Saavedra MD       • dextrose (D50W) 25 g/ 50mL Intravenous Solution 25 g  25 g Intravenous Q15 Min PRN Boogie Saavedra MD   25 g at 03/30/20 0033   • dextrose (GLUTOSE) oral gel 15 g  15 g Oral Q15 Min PRN Boogie Saavedra MD       • dilTIAZem (CARDIZEM) 100 mg in 100 mL NS (1 mg/mL) infusion  5-15 mg/hr Intravenous Titrated Lucy Marinelli APRN 5 mL/hr at 04/06/20 1932 5 mg/hr at 04/06/20 1932   • glucagon (human recombinant) (GLUCAGEN DIAGNOSTIC) injection 1 mg  1 mg Subcutaneous Q15 Min PRN Boogie Saavedra MD       • hydrALAZINE (APRESOLINE) injection 20 mg  20 mg Intravenous Q6H PRN Chandana Kapoor MD   20 mg at 04/05/20 0518   • insulin lispro (humaLOG) injection 0-7  Units  0-7 Units Subcutaneous 4x Daily With Meals & Nightly Chandana Kapoor MD   2 Units at 04/07/20 1333   • ipratropium-albuterol (DUO-NEB) nebulizer solution 3 mL  3 mL Nebulization Q2H PRN Boogie Saavedra MD       • magic mouthwash oral supsension 15 mL  15 mL Swish & Spit Q4H PRN Mario Saavedra MD   15 mL at 04/01/20 2022   • metoprolol tartrate (LOPRESSOR) injection 5 mg  5 mg Intravenous Q6H PRN Roni Gutierrez, DO       • ondansetron (ZOFRAN) tablet 4 mg  4 mg Oral Q6H PRN Boogie Saavedra MD        Or   • ondansetron (ZOFRAN) injection 4 mg  4 mg Intravenous Q6H PRN Boogie Saavedra MD       • pantoprazole (PROTONIX) injection 40 mg  40 mg Intravenous BID AC Chandana Kapoor MD   40 mg at 04/07/20 0655   • potassium chloride (KLOR-CON) packet 40 mEq  40 mEq Oral Once Emily Davis MD           Assessment/Plan   1. KIERAN, non-oliguric.  HD yesterday. Replace K. Her creatinine is 1.35 after dialysis yesterday.  May be starting to recover some function.  Will check am chem prior to writing dialysis orders. Diuretic today .  2. GI bleed, hemorrhagic shock.  Duodenal ulcer, erosive gastritis on EGD  3. Anemia of blood loss, GIB, oral mucosal bleeding.  4. Left subacute left MCA CVA. Aphasia .  5. Dementia  6. HTN  7. DM2      Emily Davis MD  04/07/20  15:38

## 2020-04-07 NOTE — PROGRESS NOTES
"Patient Name: Darby Workman  :1945  75 y.o.      Patient Care Team:  Eric Matta MD as PCP - General (General Practice)    Interval History:   Currently in sinus rhythm.    Subjective:  Following for paroxysmal A. fib    Objective   Vital Signs  Temp:  [97.8 °F (36.6 °C)-98.1 °F (36.7 °C)] 97.9 °F (36.6 °C)  Heart Rate:  [63-77] 63  Resp:  [16] 16  BP: (150-168)/(65-83) 168/76    Intake/Output Summary (Last 24 hours) at 2020 1133  Last data filed at 2020 0658  Gross per 24 hour   Intake --   Output 550 ml   Net -550 ml     Flowsheet Rows      First Filed Value   Admission Height  165.1 cm (65\") Documented at 2020 1029   Admission Weight  60.3 kg (133 lb) Documented at 2020 1029        No exam due to Covid     Results Review:    Results from last 7 days   Lab Units 20  0625   SODIUM mmol/L 139   POTASSIUM mmol/L 3.3*   CHLORIDE mmol/L 101   CO2 mmol/L 24.0   BUN mg/dL 27*   CREATININE mg/dL 1.35*   GLUCOSE mg/dL 150*   CALCIUM mg/dL 8.2*     Results from last 7 days   Lab Units 20  2304   TROPONIN T ng/mL 0.023     Results from last 7 days   Lab Units 20  0625   WBC 10*3/mm3 7.10   HEMOGLOBIN g/dL 9.7*   HEMATOCRIT % 29.5*   PLATELETS 10*3/mm3 172     Results from last 7 days   Lab Units 20  0509 20  0517 20  0534   INR  1.35* 1.68* 1.69*   APTT seconds 37.5*  --   --          Results from last 7 days   Lab Units 20  0625   MAGNESIUM mg/dL 1.7             Medication Review:     insulin lispro 0-7 Units Subcutaneous 4x Daily With Meals & Nightly   pantoprazole 40 mg Intravenous BID AC          dilTIAZem 5-15 mg/hr Last Rate: 5 mg/hr (20)       Assessment/Plan     1.  Atrial fibrillation.  No anticoagulation due to GI bleed.  In and out of atrial fibrillation.  Currently in sinus rhythm.  On 5 mg of IV diltiazem.  Cannot swallow.    She is currently DNR but at this point in time they want everything else done for her.  Supposed to " speak with palliative care today.  She is certainly appropriate for palliative care.  Continue IV diltiazem in the meantime.  No anticoagulation.  2.  Hypertension.  3.  Upper GI bleed on oral bleeding.  EGD showed a duodenal bulb ulcer and erosive gastritis.  4.  Acute blood loss anemia  5.  Metabolic encephalopathy on top of history of dementia  6.  Acute renal failure  7.  Thrombocytopenia      Veronika Perez MD, [facility]  04/07/20  11:33

## 2020-04-08 LAB
ALBUMIN SERPL-MCNC: 2.5 G/DL (ref 3.5–5.2)
ALBUMIN/GLOB SERPL: 0.9 G/DL
ALP SERPL-CCNC: 330 U/L (ref 39–117)
ALT SERPL W P-5'-P-CCNC: 29 U/L (ref 1–33)
ANION GAP SERPL CALCULATED.3IONS-SCNC: 15.2 MMOL/L (ref 5–15)
AST SERPL-CCNC: 27 U/L (ref 1–32)
BASOPHILS # BLD AUTO: 0.01 10*3/MM3 (ref 0–0.2)
BASOPHILS NFR BLD AUTO: 0.2 % (ref 0–1.5)
BILIRUB SERPL-MCNC: 1.7 MG/DL (ref 0.2–1.2)
BUN BLD-MCNC: 35 MG/DL (ref 8–23)
BUN/CREAT SERPL: 25.4 (ref 7–25)
CALCIUM SPEC-SCNC: 8.6 MG/DL (ref 8.6–10.5)
CHLORIDE SERPL-SCNC: 106 MMOL/L (ref 98–107)
CO2 SERPL-SCNC: 23.8 MMOL/L (ref 22–29)
CREAT BLD-MCNC: 1.38 MG/DL (ref 0.57–1)
DEPRECATED RDW RBC AUTO: 46 FL (ref 37–54)
EOSINOPHIL # BLD AUTO: 0.11 10*3/MM3 (ref 0–0.4)
EOSINOPHIL NFR BLD AUTO: 1.7 % (ref 0.3–6.2)
ERYTHROCYTE [DISTWIDTH] IN BLOOD BY AUTOMATED COUNT: 14.4 % (ref 12.3–15.4)
GFR SERPL CREATININE-BSD FRML MDRD: 37 ML/MIN/1.73
GLOBULIN UR ELPH-MCNC: 2.9 GM/DL
GLUCOSE BLD-MCNC: 171 MG/DL (ref 65–99)
GLUCOSE BLDC GLUCOMTR-MCNC: 145 MG/DL (ref 70–130)
GLUCOSE BLDC GLUCOMTR-MCNC: 150 MG/DL (ref 70–130)
GLUCOSE BLDC GLUCOMTR-MCNC: 160 MG/DL (ref 70–130)
GLUCOSE BLDC GLUCOMTR-MCNC: 173 MG/DL (ref 70–130)
HCT VFR BLD AUTO: 30.3 % (ref 34–46.6)
HGB BLD-MCNC: 9.8 G/DL (ref 12–15.9)
IMM GRANULOCYTES # BLD AUTO: 0.02 10*3/MM3 (ref 0–0.05)
IMM GRANULOCYTES NFR BLD AUTO: 0.3 % (ref 0–0.5)
LYMPHOCYTES # BLD AUTO: 0.38 10*3/MM3 (ref 0.7–3.1)
LYMPHOCYTES NFR BLD AUTO: 5.9 % (ref 19.6–45.3)
MAGNESIUM SERPL-MCNC: 1.6 MG/DL (ref 1.6–2.4)
MCH RBC QN AUTO: 28.8 PG (ref 26.6–33)
MCHC RBC AUTO-ENTMCNC: 32.3 G/DL (ref 31.5–35.7)
MCV RBC AUTO: 89.1 FL (ref 79–97)
MONOCYTES # BLD AUTO: 0.33 10*3/MM3 (ref 0.1–0.9)
MONOCYTES NFR BLD AUTO: 5.2 % (ref 5–12)
NEUTROPHILS # BLD AUTO: 5.54 10*3/MM3 (ref 1.7–7)
NEUTROPHILS NFR BLD AUTO: 86.7 % (ref 42.7–76)
NRBC BLD AUTO-RTO: 0 /100 WBC (ref 0–0.2)
PLATELET # BLD AUTO: 188 10*3/MM3 (ref 140–450)
PMV BLD AUTO: 11.5 FL (ref 6–12)
POTASSIUM BLD-SCNC: 3.2 MMOL/L (ref 3.5–5.2)
PROT SERPL-MCNC: 5.4 G/DL (ref 6–8.5)
RBC # BLD AUTO: 3.4 10*6/MM3 (ref 3.77–5.28)
SODIUM BLD-SCNC: 145 MMOL/L (ref 136–145)
WBC NRBC COR # BLD: 6.39 10*3/MM3 (ref 3.4–10.8)

## 2020-04-08 PROCEDURE — 25010000002 HYDRALAZINE PER 20 MG: Performed by: INTERNAL MEDICINE

## 2020-04-08 PROCEDURE — 80053 COMPREHEN METABOLIC PANEL: CPT | Performed by: INTERNAL MEDICINE

## 2020-04-08 PROCEDURE — 25010000003 POTASSIUM CHLORIDE PER 2 MEQ: Performed by: INTERNAL MEDICINE

## 2020-04-08 PROCEDURE — 83735 ASSAY OF MAGNESIUM: CPT | Performed by: NURSE PRACTITIONER

## 2020-04-08 PROCEDURE — 82962 GLUCOSE BLOOD TEST: CPT

## 2020-04-08 PROCEDURE — 25010000002 MAGNESIUM SULFATE IN D5W 1G/100ML (PREMIX) 1-5 GM/100ML-% SOLUTION: Performed by: HOSPITALIST

## 2020-04-08 PROCEDURE — 99232 SBSQ HOSP IP/OBS MODERATE 35: CPT | Performed by: INTERNAL MEDICINE

## 2020-04-08 PROCEDURE — 25010000002 FUROSEMIDE PER 20 MG: Performed by: INTERNAL MEDICINE

## 2020-04-08 PROCEDURE — 85025 COMPLETE CBC W/AUTO DIFF WBC: CPT | Performed by: INTERNAL MEDICINE

## 2020-04-08 PROCEDURE — 63710000001 INSULIN LISPRO (HUMAN) PER 5 UNITS: Performed by: INTERNAL MEDICINE

## 2020-04-08 RX ORDER — MAGNESIUM SULFATE 1 G/100ML
2 INJECTION INTRAVENOUS ONCE
Status: COMPLETED | OUTPATIENT
Start: 2020-04-08 | End: 2020-04-08

## 2020-04-08 RX ORDER — POTASSIUM CHLORIDE 29.8 MG/ML
20 INJECTION INTRAVENOUS ONCE
Status: COMPLETED | OUTPATIENT
Start: 2020-04-08 | End: 2020-04-08

## 2020-04-08 RX ORDER — POTASSIUM CHLORIDE 750 MG/1
40 CAPSULE, EXTENDED RELEASE ORAL ONCE
Status: DISCONTINUED | OUTPATIENT
Start: 2020-04-08 | End: 2020-04-08

## 2020-04-08 RX ORDER — POTASSIUM CHLORIDE 1.5 G/1.77G
40 POWDER, FOR SOLUTION ORAL ONCE
Status: DISCONTINUED | OUTPATIENT
Start: 2020-04-08 | End: 2020-04-08

## 2020-04-08 RX ADMIN — INSULIN LISPRO 2 UNITS: 100 INJECTION, SOLUTION INTRAVENOUS; SUBCUTANEOUS at 11:24

## 2020-04-08 RX ADMIN — HYDRALAZINE HYDROCHLORIDE 20 MG: 20 INJECTION INTRAMUSCULAR; INTRAVENOUS at 23:25

## 2020-04-08 RX ADMIN — SODIUM CHLORIDE 5 MG/HR: 9 INJECTION, SOLUTION INTRAVENOUS at 10:46

## 2020-04-08 RX ADMIN — FUROSEMIDE 80 MG: 10 INJECTION, SOLUTION INTRAMUSCULAR; INTRAVENOUS at 05:23

## 2020-04-08 RX ADMIN — SODIUM CHLORIDE 10 MG/HR: 900 INJECTION, SOLUTION INTRAVENOUS at 23:20

## 2020-04-08 RX ADMIN — POTASSIUM CHLORIDE 20 MEQ: 29.8 INJECTION, SOLUTION INTRAVENOUS at 18:10

## 2020-04-08 RX ADMIN — SODIUM CHLORIDE 5 MG/HR: 900 INJECTION, SOLUTION INTRAVENOUS at 10:46

## 2020-04-08 RX ADMIN — PANTOPRAZOLE SODIUM 40 MG: 40 INJECTION, POWDER, FOR SOLUTION INTRAVENOUS at 18:55

## 2020-04-08 RX ADMIN — PANTOPRAZOLE SODIUM 40 MG: 40 INJECTION, POWDER, FOR SOLUTION INTRAVENOUS at 07:15

## 2020-04-08 RX ADMIN — MAGNESIUM SULFATE 2 G: 1 INJECTION INTRAVENOUS at 15:12

## 2020-04-08 RX ADMIN — PANTOPRAZOLE SODIUM 40 MG: 40 INJECTION, POWDER, FOR SOLUTION INTRAVENOUS at 06:37

## 2020-04-08 NOTE — PLAN OF CARE
Pt cont to receive Cardizem for HR and BP.  Cardine restarted per Cardiology and stopped per Nephrology.  Mag ordered per [facility] and DC'd per Nephrology . K+ ordered via IV.  Pt cont to refuse any oral intake.  Spoke to  and daughter.  They are not making a decision regarding palliative today and said they would call back tomorrow.   is having a hard time disconnecting her from the Cardiac medications.  I told him that this is no way to live, cont fighting the HR and BP and she needs to be placed in comfort care.  He said that he would call the Palliative unit and discuss further options from the Palliative nurse.  Will cont to monitor.    Problem: Fall Risk (Adult)  Goal: Absence of Fall  Outcome: Ongoing (interventions implemented as appropriate)     Problem: Restraint, Nonbehavioral (Nonviolent)  Goal: Rationale and Justification  Outcome: Ongoing (interventions implemented as appropriate)  Goal: Nonbehavioral (Nonviolent) Restraint: Absence of Injury/Harm  Outcome: Ongoing (interventions implemented as appropriate)  Goal: Nonbehavioral (Nonviolent) Restraint: Achievement of Discontinuation Criteria  Outcome: Ongoing (interventions implemented as appropriate)  Goal: Nonbehavioral (Nonviolent) Restraint: Preservation of Dignity and Wellbeing  Outcome: Ongoing (interventions implemented as appropriate)     Problem: Renal Failure/Kidney Injury, Acute (Adult)  Goal: Signs and Symptoms of Listed Potential Problems Will be Absent, Minimized or Managed (Renal Failure/Kidney Injury, Acute)  Outcome: Ongoing (interventions implemented as appropriate)     Problem: Pain, Acute (Adult)  Goal: Acceptable Pain Control/Comfort Level  Outcome: Ongoing (interventions implemented as appropriate)     Problem: Gastrointestinal Bleeding (Adult)  Goal: Signs and Symptoms of Listed Potential Problems Will be Absent, Minimized or Managed (Gastrointestinal Bleeding)  Outcome: Ongoing (interventions implemented as  appropriate)     Problem: Skin Injury Risk (Adult)  Goal: Skin Health and Integrity  Outcome: Ongoing (interventions implemented as appropriate)     Problem: Patient Care Overview  Goal: Plan of Care Review  Outcome: Ongoing (interventions implemented as appropriate)  Goal: Individualization and Mutuality  Outcome: Ongoing (interventions implemented as appropriate)  Goal: Discharge Needs Assessment  Outcome: Ongoing (interventions implemented as appropriate)  Goal: Interprofessional Rounds/Family Conf  Outcome: Ongoing (interventions implemented as appropriate)

## 2020-04-08 NOTE — PROGRESS NOTES
Adult Nutrition  Assessment/PES    Patient Name:  Darby Workman  YOB: 1945  MRN: [id number]  Admit Date:  3/27/2020    Assessment Date:  4/8/2020    Comments:  Nutrition follow up.  Spoke with RN via telephone - she reports patient not eating anything.  0% x 3 meals per chart PO data.   going to make a palliative decision by the end of the week.    RD to continue to follow.    RD working remotely d/t COVID pandemic.  Reviewed EMR and spoke with RN.  Can be reached at [phone], via securechart or email.    Reason for Assessment     Row Name 04/08/20 1342          Reason for Assessment    Reason For Assessment  follow-up protocol         Nutrition/Diet History     Row Name 04/08/20 1345          Nutrition/Diet History    Typical Food/Fluid Intake  RN reports not eating anything         Nutrition Prescription Ordered     Row Name 04/08/20 1346          Nutrition Prescription PO    Current PO Diet  Pureed     Fluid Consistency  Nectar/syrup thick     Supplement  Boost Pudding (Ensure Pudding)     Supplement Frequency  3 times a day     Common Modifiers  Cardiac         Evaluation of Received Nutrient/Fluid Intake     Row Name 04/08/20 1346          PO Evaluation    Number of Meals  3     % PO Intake  0         Problem/Interventions:    Intervention Goal     Row Name 04/08/20 1343          Intervention Goal    General  Maintain nutrition;Palliative Care;Reduce/improve symptoms;Disease management/therapy family supposed to make decision about palliative by end of the week     PO  Tolerate PO;Increase intake;PO intake (%)     PO Intake %  75 %     Weight  Maintain weight         Nutrition Intervention     Row Name 04/08/20 1340          Nutrition Intervention    RD/Tech Action  Follow Tx progress;Care plan reviewd     Recommended/Ordered  Supplement         Education/Evaluation     Row Name 04/08/20 134          Education    Education  Will Instruct as appropriate        Monitor/Evaluation     Monitor  Per protocol;PO intake;Supplement intake;Pertinent labs;Weight;Skin status;Symptoms     Education Follow-up  Reinforce PRN           Electronically signed by:  Marialuisa Smith RD  04/08/20 13:48

## 2020-04-08 NOTE — PROGRESS NOTES
"   LOS: 12 days   Patient Care Team:  Eric Matta MD as PCP - General (General Practice)    Chief Complaint: none today    Subjective     Pt denies SOA, pain. Aphasic.      Subjective:  Symptoms:  Stable.  She reports anorexia.  No shortness of breath, chest pain or diarrhea.    Diet:  Poor intake.  No vomiting.    Activity level: Impaired due to weakness.    Pain:  She reports no pain.        History taken from: patient chart RN    Objective     Vital Signs  Temp:  [97.8 °F (36.6 °C)-98.5 °F (36.9 °C)] 97.9 °F (36.6 °C)  Heart Rate:  [66-73] 71  Resp:  [16] 16  BP: (157-253)/() 247/101    Objective:  General Appearance:  Comfortable, in no acute distress and ill-appearing.    Vital signs: (most recent): Blood pressure (!) 247/101, pulse 71, temperature 97.9 °F (36.6 °C), temperature source Oral, resp. rate 16, height 165.1 cm (65\"), weight 55.9 kg (123 lb 3.8 oz), SpO2 99 %.  Vital signs are normal.  No fever.    Output: Producing urine.    HEENT: (Dried blood around mouth)    Lungs:  Normal effort and normal respiratory rate.  Breath sounds clear to auscultation.  She is not in respiratory distress.  (Anteriorly)  Heart: Normal rate.  Regular rhythm.    Abdomen: Abdomen is soft.  Bowel sounds are normal.   There is no abdominal tenderness.     Extremities: There is dependent edema (of BUEs).  (Wrist restraints in place to protect dialysis access)  Pulses: Distal pulses are intact.    Neurological: Patient is alert.  (Aphasic, difficulty following instructions, some mild right sided weakness but difficult to quantify).    Pupils:  Pupils are equal, round, and reactive to light.    Skin:  Warm and dry.              Results Review:     I reviewed the patient's new clinical results.  I reviewed the patient's other test results and agree with the interpretation  I personally viewed and interpreted the patient's EKG/Telemetry data  Discussed with pt and RN    Results from last 7 days   Lab Units 04/08/20  0533 " 04/07/20  0625 04/06/20  1930 04/06/20  0525 04/06/20  0010 04/05/20  1609 04/05/20  0510  04/04/20  0546 04/03/20  0517 04/02/20  2304   WBC 10*3/mm3 6.39 7.10  --  8.40  --   --  8.70  --  9.15 11.08* 12.27*   HEMOGLOBIN g/dL 9.8* 9.7* 10.6* 10.1* 9.8* 9.8* 9.3*   < > 9.2* 7.5* 7.7*   PLATELETS 10*3/mm3 188 172  --  175  --   --  127*  --  102* 88* 84*    < > = values in this interval not displayed.     Results from last 7 days   Lab Units 04/08/20  0533 04/07/20  0625 04/06/20  0525 04/05/20  0509 04/04/20  0546 04/03/20  0517 04/02/20  2304 04/02/20  0534   SODIUM mmol/L 145 139 146* 142 138 145 144 147*   POTASSIUM mmol/L 3.2* 3.3* 3.3* 3.4* 3.7 3.6 3.5 3.2*   CHLORIDE mmol/L 106 101 109* 103 100 101 101 102   CO2 mmol/L 23.8 24.0 22.4 24.3 23.2 24.1 24.0 25.7   BUN mg/dL 35* 27* 47* 51* 42* 84* 81* 122*   CREATININE mg/dL 1.38* 1.35* 1.67* 2.11* 2.06* 3.65* 3.75* 5.65*   CALCIUM mg/dL 8.6 8.2* 8.5* 8.3* 8.2* 8.1* 7.9* 7.6*   MAGNESIUM mg/dL 1.6 1.7 1.8 1.9 1.9  --   --  2.1   PHOSPHORUS mg/dL  --  2.9 3.2 3.9  --  4.9*  --   --    Estimated Creatinine Clearance: 30.6 mL/min (A) (by C-G formula based on SCr of 1.38 mg/dL (H)).    Medication Review: reviewed and adjusted    Assessment/Plan       Upper GI bleed    Hyperlipidemia    Hypertension    Uremic encephalopathy    Acute kidney injury (CMS/HCC)    Acute pancreatitis    Hemorrhagic shock (CMS/HCC)    Thrombocytopenia (CMS/HCC)    Type 2 diabetes mellitus with hyperglycemia (CMS/HCC)    Dementia without behavioral disturbance (CMS/HCC)    Acute posthemorrhagic anemia          Plan:   (· GIB/Hemorrhagic Shock/ICU admission: Required transfusion and Kcentra. Duodenal Ulcer and erosive gastritis noted on EGD. Anticoagulation held. Also has oral component of blood loss as well due to erosions of oral mucosa. GI, Surgery, and Oncology evaluated. All have recommended palliative care given her poor prognosis and multiorgan failure. Has continued oral oozing and had  additional rectal bleed few days ago. Hgb is stable today. Changed to BID IV protonix.   · CVA: subacute left MCA CVA. Subacute based on CT and aphasia. Neurology evaluated. She is not AC or antiplatelet candidate at this time. She had this even while she was on Eliquis and aspirin prior to admission. Aphasic speech but is a bit more interactive today. SLP has started modified diet based on their eval 4/6 but pt not interested in eating  · Dementia with metabolic encephalopathy  · Leukocytosis: Reactive in nature. No antibiotics indicated. Infectious Disease has evaluated.  · AFib: on Cardizem gtt and HR stable. Cardiology following.  · HTN: Off Cardene gtt due to hypotension with episode of RVR. BP improved but is now very high again, Card has restarted Cardene gtt today  · TCP: resolved  · KIERAN: Dialysis intiated. Nephrology following. Will replace K+ and Mg++ today  · Disposition: Poor prognosis. Family to consider palliative care after d/w their ).       Ajit Elder MD  04/08/20  14:50    Time: 30min

## 2020-04-08 NOTE — PLAN OF CARE
More alert, answers yes and no question, says yes when asked if mouth sore, lips with dry blood, bites down on sponge with oral care, took 2 sips of NTL water, no sign of aspiration, refused more to drink, restraints remain in place, moves left hand to neck when restraints loosened, vs's, NSR on monitor, will continue to monitor

## 2020-04-08 NOTE — PROGRESS NOTES
"   LOS: 12 days    Patient Care Team:  Eric Matta MD as PCP - General (General Practice)    Chief Complaint:    Chief Complaint   Patient presents with   • Altered Mental Status   • Black or Bloody Stool     KIERAN  Subjective     Interval History:     Patient refusing all po meds, fluids, food.  No decision from family regarding palliative.   Extremely hypertensive . Now on cardizem drip and nicardipine .  Review of Systems:   Unable to obtain    Objective     Vital Signs  Temp:  [97.8 °F (36.6 °C)-98.5 °F (36.9 °C)] 97.9 °F (36.6 °C)  Heart Rate:  [66-73] 71  Resp:  [16] 16  BP: (157-253)/() 247/101    Flowsheet Rows      First Filed Value   Admission Height  165.1 cm (65\") Documented at 03/27/2020 1029   Admission Weight  60.3 kg (133 lb) Documented at 03/27/2020 1029          I/O this shift:  In: -   Out: 1200 [Urine:1200]  I/O last 3 completed shifts:  In: 60 [P.O.:60]  Out: 1350 [Urine:1350]    Intake/Output Summary (Last 24 hours) at 4/8/2020 1548  Last data filed at 4/8/2020 1145  Gross per 24 hour   Intake --   Output 2450 ml   Net -2450 ml       Physical Exam:  Elderly wf.  Oral mucosa with signif dried blood inside and outside.    Raises eyebrows.  Does not follow commands .  Neck no jvd. THERESA Upton day 6  Heart RRR No s3 or rub  Lungs clear to auscultation, no wheezing  Abd + bs, soft, no guarding or rebound  Ext 3+ upper and 2+lower ext edema.         Results Review:    Results from last 7 days   Lab Units 04/08/20  0533 04/07/20  0625 04/06/20  0525   SODIUM mmol/L 145 139 146*   POTASSIUM mmol/L 3.2* 3.3* 3.3*   CHLORIDE mmol/L 106 101 109*   CO2 mmol/L 23.8 24.0 22.4   BUN mg/dL 35* 27* 47*   CREATININE mg/dL 1.38* 1.35* 1.67*   CALCIUM mg/dL 8.6 8.2* 8.5*   BILIRUBIN mg/dL 1.7* 1.8* 2.2*   ALK PHOS U/L 330* 341* 366*   ALT (SGPT) U/L 29 31 31   AST (SGOT) U/L 27 31 36*   GLUCOSE mg/dL 171* 150* 180*       Estimated Creatinine Clearance: 30.6 mL/min (A) (by C-G formula based on SCr of 1.38 mg/dL " (H)).    Results from last 7 days   Lab Units 04/08/20  0533 04/07/20  0625 04/06/20  0525 04/05/20  0509   MAGNESIUM mg/dL 1.6 1.7 1.8 1.9   PHOSPHORUS mg/dL  --  2.9 3.2 3.9             Results from last 7 days   Lab Units 04/08/20  0533 04/07/20  0625 04/06/20  1930 04/06/20  0525 04/06/20  0010  04/05/20  0510  04/04/20  0546   WBC 10*3/mm3 6.39 7.10  --  8.40  --   --  8.70  --  9.15   HEMOGLOBIN g/dL 9.8* 9.7* 10.6* 10.1* 9.8*   < > 9.3*   < > 9.2*   PLATELETS 10*3/mm3 188 172  --  175  --   --  127*  --  102*    < > = values in this interval not displayed.       Results from last 7 days   Lab Units 04/05/20  0509 04/03/20  0517 04/02/20  0534   INR  1.35* 1.68* 1.69*         Imaging Results (Last 24 Hours)     ** No results found for the last 24 hours. **          insulin lispro 0-7 Units Subcutaneous 4x Daily With Meals & Nightly   pantoprazole 40 mg Intravenous BID AC   potassium chloride 40 mEq Oral Once   potassium chloride 40 mEq Oral Once       dilTIAZem 5-15 mg/hr Last Rate: 5 mg/hr (04/08/20 1046)   niCARdipine 5-15 mg/hr Last Rate: 5 mg/hr (04/08/20 1336)       Medication Review:   Current Facility-Administered Medications   Medication Dose Route Frequency Provider Last Rate Last Dose   • acetaminophen (TYLENOL) tablet 650 mg  650 mg Oral Q4H PRN Boogie Saavedra MD        Or   • acetaminophen (TYLENOL) suppository 650 mg  650 mg Rectal Q4H PRN Boogie Saavedra MD       • dextrose (D50W) 25 g/ 50mL Intravenous Solution 25 g  25 g Intravenous Q15 Min PRN Boogie Saavedra MD   25 g at 03/30/20 0033   • dextrose (GLUTOSE) oral gel 15 g  15 g Oral Q15 Min PRN Boogie Saavedra MD       • dilTIAZem (CARDIZEM) 100 mg in 100 mL NS (1 mg/mL) infusion  5-15 mg/hr Intravenous Titrated Lucy Marinelli APRN 5 mL/hr at 04/08/20 1046 5 mg/hr at 04/08/20 1046   • glucagon (human recombinant) (GLUCAGEN DIAGNOSTIC) injection 1 mg  1 mg Subcutaneous Q15 Min PRN Boogie Saavedra MD       • hydrALAZINE (APRESOLINE)  injection 20 mg  20 mg Intravenous Q6H PRN Chandana Kapoor MD   20 mg at 04/05/20 0518   • insulin lispro (humaLOG) injection 0-7 Units  0-7 Units Subcutaneous 4x Daily With Meals & Nightly Chandana Kapoor MD   2 Units at 04/08/20 1124   • ipratropium-albuterol (DUO-NEB) nebulizer solution 3 mL  3 mL Nebulization Q2H PRN Boogie Saavedra MD       • magic mouthwash oral supsension 15 mL  15 mL Swish & Spit Q4H PRN Mario Saavedra MD   15 mL at 04/01/20 2022   • metoprolol tartrate (LOPRESSOR) injection 5 mg  5 mg Intravenous Q6H PRN Roni Gutierrez DO       • niCARdipine (CARDENE) 25 mg in 250 mL NS (0.1 mg/mL) infusion kit  5-15 mg/hr Intravenous Titrated Veronika Perez MD 50 mL/hr at 04/08/20 1336 5 mg/hr at 04/08/20 1336   • ondansetron (ZOFRAN) tablet 4 mg  4 mg Oral Q6H PRN Boogie Saavedra MD        Or   • ondansetron (ZOFRAN) injection 4 mg  4 mg Intravenous Q6H PRN Boogie Saavedra MD       • pantoprazole (PROTONIX) injection 40 mg  40 mg Intravenous BID AC Chandana Kapoor MD   40 mg at 04/08/20 0715   • potassium chloride (KLOR-CON) packet 40 mEq  40 mEq Oral Once Emily Davis MD       • potassium chloride (MICRO-K) CR capsule 40 mEq  40 mEq Oral Once Ajit Elder MD           Assessment/Plan   1. KIERAN, non-oliguric.  HD last on 4.6.  Replace K IV.. Her creatinine is 1.35 today . I think KIERAN is resolving.  Still has shiley.  Will need to keep it for now for IV access as her arms are too edematous for any other IV access  2. GI bleed, hemorrhagic shock.  Duodenal ulcer, erosive gastritis on EGD  3. Anemia of blood loss, GIB, oral mucosal bleeding.  4. Left subacute left MCA CVA. Aphasia .  5. Dementia  6. HTN  7. DM2    I think that Palliative care is the next step.  Waiting on family decision .    Emily Davis MD  04/08/20  15:48

## 2020-04-08 NOTE — PROGRESS NOTES
"Patient Name: Darby Workman  :1945  75 y.o.      Patient Care Team:  Eric Matta MD as PCP - General (General Practice)    Interval History:   Spoke with the patient's nurse this morning.  She is hypertensive.  Heart rate is well controlled.    Subjective:  Following for atrial fibrillation.    Objective   Vital Signs  Temp:  [97.8 °F (36.6 °C)-98.5 °F (36.9 °C)] 98.5 °F (36.9 °C)  Heart Rate:  [66-76] 70  Resp:  [16] 16  BP: (157-253)/() 253/114    Intake/Output Summary (Last 24 hours) at 2020 1020  Last data filed at 2020 0023  Gross per 24 hour   Intake --   Output 1250 ml   Net -1250 ml     Flowsheet Rows      First Filed Value   Admission Height  165.1 cm (65\") Documented at 2020 1029   Admission Weight  60.3 kg (133 lb) Documented at 2020 1029          No exam secondary to Covid. revied exam documented by RN and MDs    Results Review:    Results from last 7 days   Lab Units 20  0533   SODIUM mmol/L 145   POTASSIUM mmol/L 3.2*   CHLORIDE mmol/L 106   CO2 mmol/L 23.8   BUN mg/dL 35*   CREATININE mg/dL 1.38*   GLUCOSE mg/dL 171*   CALCIUM mg/dL 8.6     Results from last 7 days   Lab Units 20  2304   TROPONIN T ng/mL 0.023     Results from last 7 days   Lab Units 20  0533   WBC 10*3/mm3 6.39   HEMOGLOBIN g/dL 9.8*   HEMATOCRIT % 30.3*   PLATELETS 10*3/mm3 188     Results from last 7 days   Lab Units 20  0509 20  0517 20  0534   INR  1.35* 1.68* 1.69*   APTT seconds 37.5*  --   --          Results from last 7 days   Lab Units 20  0533   MAGNESIUM mg/dL 1.6             Medication Review:     insulin lispro 0-7 Units Subcutaneous 4x Daily With Meals & Nightly   pantoprazole 40 mg Intravenous BID AC   potassium chloride 40 mEq Oral Once          dilTIAZem 5-15 mg/hr Last Rate: 5 mg/hr (20 8375)   niCARdipine 5-15 mg/hr        Assessment/Plan     1.  Atrial fibrillation.  No anticoagulation due to GI bleed.  In and out of atrial " fibrillation.  Currently in sinus rhythm.  On 5 mg of IV diltiazem.  Cannot swallow.    She is currently DNR but at this point in time they want everything else done for her. Awaiting palliative plans from .  2.  Hypertension. Add cardene.  3.  Upper GI bleed on oral bleeding.  EGD showed a duodenal bulb ulcer and erosive gastritis.  4.  Acute blood loss anemia  5.  Metabolic encephalopathy on top of history of dementia  6.  Acute renal failure  7.  Thrombocytopenia    Veronika Perez MD, [facility]  04/08/20  10:20

## 2020-04-09 LAB
ALBUMIN SERPL-MCNC: 2.6 G/DL (ref 3.5–5.2)
ALBUMIN/GLOB SERPL: 0.9 G/DL
ALP SERPL-CCNC: 297 U/L (ref 39–117)
ALT SERPL W P-5'-P-CCNC: 24 U/L (ref 1–33)
ANION GAP SERPL CALCULATED.3IONS-SCNC: 15.3 MMOL/L (ref 5–15)
AST SERPL-CCNC: 27 U/L (ref 1–32)
BASOPHILS # BLD AUTO: 0.01 10*3/MM3 (ref 0–0.2)
BASOPHILS NFR BLD AUTO: 0.1 % (ref 0–1.5)
BILIRUB SERPL-MCNC: 1.5 MG/DL (ref 0.2–1.2)
BUN BLD-MCNC: 35 MG/DL (ref 8–23)
BUN/CREAT SERPL: 28.9 (ref 7–25)
CALCIUM SPEC-SCNC: 8.5 MG/DL (ref 8.6–10.5)
CHLORIDE SERPL-SCNC: 107 MMOL/L (ref 98–107)
CO2 SERPL-SCNC: 23.7 MMOL/L (ref 22–29)
CREAT BLD-MCNC: 1.21 MG/DL (ref 0.57–1)
DEPRECATED RDW RBC AUTO: 45.9 FL (ref 37–54)
EOSINOPHIL # BLD AUTO: 0.18 10*3/MM3 (ref 0–0.4)
EOSINOPHIL NFR BLD AUTO: 2.7 % (ref 0.3–6.2)
ERYTHROCYTE [DISTWIDTH] IN BLOOD BY AUTOMATED COUNT: 14.6 % (ref 12.3–15.4)
GFR SERPL CREATININE-BSD FRML MDRD: 43 ML/MIN/1.73
GLOBULIN UR ELPH-MCNC: 2.9 GM/DL
GLUCOSE BLD-MCNC: 188 MG/DL (ref 65–99)
GLUCOSE BLDC GLUCOMTR-MCNC: 159 MG/DL (ref 70–130)
GLUCOSE BLDC GLUCOMTR-MCNC: 180 MG/DL (ref 70–130)
GLUCOSE BLDC GLUCOMTR-MCNC: 187 MG/DL (ref 70–130)
GLUCOSE BLDC GLUCOMTR-MCNC: 190 MG/DL (ref 70–130)
HCT VFR BLD AUTO: 28.4 % (ref 34–46.6)
HGB BLD-MCNC: 9.4 G/DL (ref 12–15.9)
IMM GRANULOCYTES # BLD AUTO: 0.02 10*3/MM3 (ref 0–0.05)
IMM GRANULOCYTES NFR BLD AUTO: 0.3 % (ref 0–0.5)
LYMPHOCYTES # BLD AUTO: 0.32 10*3/MM3 (ref 0.7–3.1)
LYMPHOCYTES NFR BLD AUTO: 4.8 % (ref 19.6–45.3)
MAGNESIUM SERPL-MCNC: 1.5 MG/DL (ref 1.6–2.4)
MCH RBC QN AUTO: 29.1 PG (ref 26.6–33)
MCHC RBC AUTO-ENTMCNC: 33.1 G/DL (ref 31.5–35.7)
MCV RBC AUTO: 87.9 FL (ref 79–97)
MONOCYTES # BLD AUTO: 0.35 10*3/MM3 (ref 0.1–0.9)
MONOCYTES NFR BLD AUTO: 5.2 % (ref 5–12)
NEUTROPHILS # BLD AUTO: 5.85 10*3/MM3 (ref 1.7–7)
NEUTROPHILS NFR BLD AUTO: 86.9 % (ref 42.7–76)
NRBC BLD AUTO-RTO: 0 /100 WBC (ref 0–0.2)
PLATELET # BLD AUTO: 233 10*3/MM3 (ref 140–450)
PMV BLD AUTO: 10.7 FL (ref 6–12)
POTASSIUM BLD-SCNC: 3.2 MMOL/L (ref 3.5–5.2)
POTASSIUM BLD-SCNC: 3.5 MMOL/L (ref 3.5–5.2)
PROT SERPL-MCNC: 5.5 G/DL (ref 6–8.5)
RBC # BLD AUTO: 3.23 10*6/MM3 (ref 3.77–5.28)
SODIUM BLD-SCNC: 146 MMOL/L (ref 136–145)
WBC NRBC COR # BLD: 6.73 10*3/MM3 (ref 3.4–10.8)

## 2020-04-09 PROCEDURE — 25010000003 POTASSIUM CHLORIDE PER 2 MEQ: Performed by: INTERNAL MEDICINE

## 2020-04-09 PROCEDURE — 84132 ASSAY OF SERUM POTASSIUM: CPT | Performed by: INTERNAL MEDICINE

## 2020-04-09 PROCEDURE — 83735 ASSAY OF MAGNESIUM: CPT | Performed by: NURSE PRACTITIONER

## 2020-04-09 PROCEDURE — 80053 COMPREHEN METABOLIC PANEL: CPT | Performed by: INTERNAL MEDICINE

## 2020-04-09 PROCEDURE — 25010000002 HYDRALAZINE PER 20 MG: Performed by: INTERNAL MEDICINE

## 2020-04-09 PROCEDURE — 99232 SBSQ HOSP IP/OBS MODERATE 35: CPT | Performed by: INTERNAL MEDICINE

## 2020-04-09 PROCEDURE — 63710000001 INSULIN LISPRO (HUMAN) PER 5 UNITS: Performed by: INTERNAL MEDICINE

## 2020-04-09 PROCEDURE — 85025 COMPLETE CBC W/AUTO DIFF WBC: CPT | Performed by: INTERNAL MEDICINE

## 2020-04-09 PROCEDURE — 82962 GLUCOSE BLOOD TEST: CPT

## 2020-04-09 PROCEDURE — 25010000002 FUROSEMIDE PER 20 MG: Performed by: INTERNAL MEDICINE

## 2020-04-09 PROCEDURE — 25010000002 MAGNESIUM SULFATE IN D5W 1G/100ML (PREMIX) 1-5 GM/100ML-% SOLUTION: Performed by: INTERNAL MEDICINE

## 2020-04-09 RX ORDER — POTASSIUM CHLORIDE 29.8 MG/ML
20 INJECTION INTRAVENOUS ONCE
Status: COMPLETED | OUTPATIENT
Start: 2020-04-09 | End: 2020-04-09

## 2020-04-09 RX ORDER — NEBIVOLOL 10 MG/1
20 TABLET ORAL
Status: DISCONTINUED | OUTPATIENT
Start: 2020-04-09 | End: 2020-04-20 | Stop reason: HOSPADM

## 2020-04-09 RX ORDER — FUROSEMIDE 10 MG/ML
40 INJECTION INTRAMUSCULAR; INTRAVENOUS ONCE
Status: COMPLETED | OUTPATIENT
Start: 2020-04-09 | End: 2020-04-09

## 2020-04-09 RX ORDER — MAGNESIUM SULFATE 1 G/100ML
1 INJECTION INTRAVENOUS ONCE
Status: COMPLETED | OUTPATIENT
Start: 2020-04-09 | End: 2020-04-09

## 2020-04-09 RX ADMIN — INSULIN LISPRO 2 UNITS: 100 INJECTION, SOLUTION INTRAVENOUS; SUBCUTANEOUS at 12:17

## 2020-04-09 RX ADMIN — INSULIN LISPRO 2 UNITS: 100 INJECTION, SOLUTION INTRAVENOUS; SUBCUTANEOUS at 21:44

## 2020-04-09 RX ADMIN — POTASSIUM CHLORIDE 20 MEQ: 29.8 INJECTION, SOLUTION INTRAVENOUS at 11:06

## 2020-04-09 RX ADMIN — INSULIN LISPRO 2 UNITS: 100 INJECTION, SOLUTION INTRAVENOUS; SUBCUTANEOUS at 07:53

## 2020-04-09 RX ADMIN — FUROSEMIDE 40 MG: 10 INJECTION, SOLUTION INTRAMUSCULAR; INTRAVENOUS at 12:18

## 2020-04-09 RX ADMIN — HYDRALAZINE HYDROCHLORIDE 20 MG: 20 INJECTION INTRAMUSCULAR; INTRAVENOUS at 07:53

## 2020-04-09 RX ADMIN — PANTOPRAZOLE SODIUM 40 MG: 40 INJECTION, POWDER, FOR SOLUTION INTRAVENOUS at 17:52

## 2020-04-09 RX ADMIN — INSULIN LISPRO 2 UNITS: 100 INJECTION, SOLUTION INTRAVENOUS; SUBCUTANEOUS at 17:52

## 2020-04-09 RX ADMIN — HYDRALAZINE HYDROCHLORIDE 20 MG: 20 INJECTION INTRAMUSCULAR; INTRAVENOUS at 14:43

## 2020-04-09 RX ADMIN — SODIUM CHLORIDE 10 MG/HR: 900 INJECTION, SOLUTION INTRAVENOUS at 12:18

## 2020-04-09 RX ADMIN — MAGNESIUM SULFATE 1 G: 1 INJECTION INTRAVENOUS at 09:19

## 2020-04-09 RX ADMIN — PANTOPRAZOLE SODIUM 40 MG: 40 INJECTION, POWDER, FOR SOLUTION INTRAVENOUS at 05:52

## 2020-04-09 RX ADMIN — SODIUM CHLORIDE 10 MG/HR: 900 INJECTION, SOLUTION INTRAVENOUS at 21:44

## 2020-04-09 RX ADMIN — NEBIVOLOL HYDROCHLORIDE 20 MG: 10 TABLET ORAL at 11:10

## 2020-04-09 NOTE — PROGRESS NOTES
".  Patient Name: Darby Workman  :1945  75 y.o.      Patient Care Team:  Eric Matta MD as PCP - General (General Practice)    Interval History:   Continued to maintain sinus rhythm on a diltiazem drip.    Subjective:  Following for paroxysmal atrial fibrillation    Objective   Vital Signs  Temp:  [97.5 °F (36.4 °C)-98.2 °F (36.8 °C)] 97.6 °F (36.4 °C)  Heart Rate:  [61-75] 71  Resp:  [16-20] 16  BP: (148-247)/() 175/65    Intake/Output Summary (Last 24 hours) at 2020 1030  Last data filed at 2020 0914  Gross per 24 hour   Intake 144 ml   Output 1950 ml   Net -1806 ml     Flowsheet Rows      First Filed Value   Admission Height  165.1 cm (65\") Documented at 2020 1029   Admission Weight  60.3 kg (133 lb) Documented at 2020 1029          I reviewed Dr. Davis's exam.    Results Review:    Results from last 7 days   Lab Units 20  0552   SODIUM mmol/L 146*   POTASSIUM mmol/L 3.2*   CHLORIDE mmol/L 107   CO2 mmol/L 23.7   BUN mg/dL 35*   CREATININE mg/dL 1.21*   GLUCOSE mg/dL 188*   CALCIUM mg/dL 8.5*     Results from last 7 days   Lab Units 20  2304   TROPONIN T ng/mL 0.023     Results from last 7 days   Lab Units 20  0552   WBC 10*3/mm3 6.73   HEMOGLOBIN g/dL 9.4*   HEMATOCRIT % 28.4*   PLATELETS 10*3/mm3 233     Results from last 7 days   Lab Units 20  0509 20  0517   INR  1.35* 1.68*   APTT seconds 37.5*  --          Results from last 7 days   Lab Units 20  0552   MAGNESIUM mg/dL 1.5*             Medication Review:     furosemide 40 mg Intravenous Once   insulin lispro 0-7 Units Subcutaneous 4x Daily With Meals & Nightly   nebivolol 20 mg Oral Q24H   pantoprazole 40 mg Intravenous BID AC   potassium chloride 20 mEq Intravenous Once          dilTIAZem 5-15 mg/hr Last Rate: 10 mg/hr (20 3881)   niCARdipine 5-15 mg/hr Last Rate: Stopped (20 7662)       Assessment/Plan     1.  Atrial fibrillation.  No anticoagulation due to GI bleed.  In " and out of atrial fibrillation.  Currently in sinus rhythm.  On 5 mg of IV diltiazem. She is currently DNR   2.  Hypertension.  On IV diltiazem.  I see Dr. Davis added Bystolic if she will swallow.  3.  Upper GI bleed on oral bleeding.  EGD showed a duodenal bulb ulcer and erosive gastritis.  4.  Acute blood loss anemia  5.  Metabolic encephalopathy on top of history of dementia  6.  Acute renal failure  7.  Thrombocytopenia    Continue the same for now.  She is most appropriate for palliative care.    Veronika Perez MD, [facility]  04/09/20  10:30

## 2020-04-09 NOTE — PLAN OF CARE
Pt mostly nonverbal, however does mutter words every now and then. More alert than previous interactions with pt. Still little to no appetite. Did manage to get pt to take PO BP meds. BP still high, given PRN hydralazine when indicated. Cleaned mouth up. Elevated arms d/t BUE edema. IJ in place, blood return noted, cardizem gtt cont. Hemodialysis port in place, dressing changed. Polk in place, Polk care completed. Other VSS. Checked in with family who supposedly talked to  and palliative nurse today. Awaiting to see what direction in care they will go. Will cont to monitor pt.      Problem: Fall Risk (Adult)  Goal: Absence of Fall  Outcome: Ongoing (interventions implemented as appropriate)     Problem: Restraint, Nonbehavioral (Nonviolent)  Goal: Rationale and Justification  Outcome: Ongoing (interventions implemented as appropriate)  Goal: Nonbehavioral (Nonviolent) Restraint: Absence of Injury/Harm  Outcome: Ongoing (interventions implemented as appropriate)  Goal: Nonbehavioral (Nonviolent) Restraint: Achievement of Discontinuation Criteria  Outcome: Ongoing (interventions implemented as appropriate)  Goal: Nonbehavioral (Nonviolent) Restraint: Preservation of Dignity and Wellbeing  Outcome: Ongoing (interventions implemented as appropriate)     Problem: Renal Failure/Kidney Injury, Acute (Adult)  Goal: Signs and Symptoms of Listed Potential Problems Will be Absent, Minimized or Managed (Renal Failure/Kidney Injury, Acute)  Outcome: Ongoing (interventions implemented as appropriate)     Problem: Pain, Acute (Adult)  Goal: Acceptable Pain Control/Comfort Level  Outcome: Ongoing (interventions implemented as appropriate)     Problem: Gastrointestinal Bleeding (Adult)  Goal: Signs and Symptoms of Listed Potential Problems Will be Absent, Minimized or Managed (Gastrointestinal Bleeding)  Outcome: Ongoing (interventions implemented as appropriate)     Problem: Skin Injury Risk (Adult)  Goal: Skin Health and  Integrity  Outcome: Ongoing (interventions implemented as appropriate)     Problem: Patient Care Overview  Goal: Plan of Care Review  Outcome: Ongoing (interventions implemented as appropriate)  Goal: Individualization and Mutuality  Outcome: Ongoing (interventions implemented as appropriate)  Goal: Discharge Needs Assessment  Outcome: Ongoing (interventions implemented as appropriate)  Goal: Interprofessional Rounds/Family Conf  Outcome: Ongoing (interventions implemented as appropriate)

## 2020-04-09 NOTE — PROGRESS NOTES
Name: Darby Workman ADMIT: 3/27/2020   : 1945  PCP: Eric Matta MD    MRN: [id number] LOS: 13 days   AGE/SEX: 75 y.o. female  ROOM: [facility]     Subjective   Subjective   patient very somnolent today, would not arouse.      Review of Systems   Unable to perform ROS: Dementia        Objective   Objective   Vital Signs  Temp:  [97.5 °F (36.4 °C)-98.2 °F (36.8 °C)] 97.8 °F (36.6 °C)  Heart Rate:  [61-75] 68  Resp:  [16-20] 18  BP: (148-205)/(58-79) 194/72  SpO2:  [95 %-98 %] 96 %  on   ;   Device (Oxygen Therapy): room air  Body mass index is 20.88 kg/m².  Physical Exam   Constitutional: She appears ill. No distress.   elderly, frail   Cardiovascular: Normal rate and regular rhythm.   Pulmonary/Chest: Effort normal. No respiratory distress.   Psychiatric: Cognition and memory are impaired. She expresses inappropriate judgment. She is noncommunicative.   Nursing note and vitals reviewed.      Results Review:       I reviewed the patient's new clinical results.  Results from last 7 days   Lab Units 20  0552 20  0533 20  0620  19320  0525   WBC 10*3/mm3 6.73 6.39 7.10  --  8.40   HEMOGLOBIN g/dL 9.4* 9.8* 9.7* 10.6* 10.1*   PLATELETS 10*3/mm3 233 188 172  --  175     Results from last 7 days   Lab Units 20  0552 20  0533 20  0625 20  0525   SODIUM mmol/L 146* 145 139 146*   POTASSIUM mmol/L 3.2* 3.2* 3.3* 3.3*   CHLORIDE mmol/L 107 106 101 109*   CO2 mmol/L 23.7 23.8 24.0 22.4   BUN mg/dL 35* 35* 27* 47*   CREATININE mg/dL 1.21* 1.38* 1.35* 1.67*   GLUCOSE mg/dL 188* 171* 150* 180*   Estimated Creatinine Clearance: 35.5 mL/min (A) (by C-G formula based on SCr of 1.21 mg/dL (H)).  Results from last 7 days   Lab Units 20  0552 20  0533 20  0625 20  0525   ALBUMIN g/dL 2.60* 2.50* 2.70* 2.50*   BILIRUBIN mg/dL 1.5* 1.7* 1.8* 2.2*   ALK PHOS U/L 297* 330* 341* 366*   AST (SGOT) U/L 27 27 31 36*   ALT (SGPT) U/L 24 29 31 31      Results from last 7 days   Lab Units 04/09/20  0552 04/08/20  0533 04/07/20  0625 04/06/20  0525 04/05/20  0509  04/03/20  0517   CALCIUM mg/dL 8.5* 8.6 8.2* 8.5* 8.3*   < > 8.1*   ALBUMIN g/dL 2.60* 2.50* 2.70* 2.50* 2.40*   < > 2.30*   MAGNESIUM mg/dL 1.5* 1.6 1.7 1.8 1.9   < >  --    PHOSPHORUS mg/dL  --   --  2.9 3.2 3.9  --  4.9*    < > = values in this interval not displayed.       Glucose   Date/Time Value Ref Range Status   04/09/2020 0551 187 (H) 70 - 130 mg/dL Final   04/08/2020 2031 150 (H) 70 - 130 mg/dL Final   04/08/2020 1606 145 (H) 70 - 130 mg/dL Final   04/08/2020 1101 173 (H) 70 - 130 mg/dL Final   04/08/2020 0531 160 (H) 70 - 130 mg/dL Final   04/07/2020 2114 145 (H) 70 - 130 mg/dL Final   04/07/2020 1726 146 (H) 70 - 130 mg/dL Final         furosemide 40 mg Intravenous Once   insulin lispro 0-7 Units Subcutaneous 4x Daily With Meals & Nightly   nebivolol 20 mg Oral Q24H   pantoprazole 40 mg Intravenous BID AC   potassium chloride 20 mEq Intravenous Once       dilTIAZem 5-15 mg/hr Last Rate: 10 mg/hr (04/08/20 2320)   niCARdipine 5-15 mg/hr Last Rate: Stopped (04/08/20 1557)   Diet Pureed; Nectar / Syrup Thick; Cardiac       Assessment/Plan     Active Hospital Problems    Diagnosis  POA   • **Upper GI bleed [K92.2]  Yes   • Uremic encephalopathy [G93.41, N19]  Yes   • Acute kidney injury (CMS/HCC) [N17.9]  Yes   • Acute pancreatitis [K85.90]  Yes   • Hemorrhagic shock (CMS/HCC) [R57.8]  Yes   • Thrombocytopenia (CMS/HCC) [D69.6]  Yes   • Type 2 diabetes mellitus with hyperglycemia (CMS/HCC) [E11.65]  Yes   • Dementia without behavioral disturbance (CMS/HCC) [F03.90]  Yes   • Acute posthemorrhagic anemia [D62]  Yes   • Hypertension [I10]  Yes   • Hyperlipidemia [E78.5]  Yes      Resolved Hospital Problems   No resolved problems to display.     GIB/Hemorrhagic Shock/ICU admission: Required transfusion and Kcentra. Duodenal Ulcer and erosive gastritis noted on EGD. Anticoagulation held. Also has  oral component of blood loss as well due to erosions of oral mucosa. GI, Surgery, and Oncology evaluated. All have recommended palliative care given her poor prognosis and multiorgan failure. Has continued oral oozing and had additional rectal bleed few days ago. Hgb is stable today. BID IV protonix.     CVA: subacute left MCA CVA. Subacute based on CT and aphasia. Neurology evaluated. She is not AC or antiplatelet candidate at this time. She had this even while she was on Eliquis and aspirin prior to admission. Aphasic speech. SLP has started modified diet based on their eval 4/6 but pt not interested in eating.    Dementia with metabolic encephalopathy- unchanged     Leukocytosis: Reactive in nature. No antibiotics indicated. Infectious Disease has evaluated.    AFib: on Cardizem gtt and HR stable. Cardiology following.    HTN: on cardene gtt- BP still running high     TCP: resolved    KIERAN: Dialysis intiated. Nephrology following. Replacing electrolytes.    Disposition: Poor prognosis. Awaiting family decision for possible transfer to palliative.        GETACHEW Arredondo  [facility]  04/09/20  11:23

## 2020-04-09 NOTE — PROGRESS NOTES
"   LOS: 13 days    Patient Care Team:  Eric aMtta MD as PCP - General (General Practice)    Chief Complaint:    Chief Complaint   Patient presents with   • Altered Mental Status   • Black or Bloody Stool     KIERAN  Subjective     Interval History:   Much more interactive today.  Aphasic, but does try to speak when asked questions.  On cardizem drip 10 mg / h.  SR on monitor.  Did not take anything po yesterday.   Review of Systems:   Unable to obtain    Objective     Vital Signs  Temp:  [97.5 °F (36.4 °C)-98.2 °F (36.8 °C)] 97.6 °F (36.4 °C)  Heart Rate:  [61-75] 61  Resp:  [16-20] 16  BP: (148-253)/() 205/79    Flowsheet Rows      First Filed Value   Admission Height  165.1 cm (65\") Documented at 03/27/2020 1029   Admission Weight  60.3 kg (133 lb) Documented at 03/27/2020 1029          No intake/output data recorded.  I/O last 3 completed shifts:  In: 144 [I.V.:144]  Out: 2200 [Urine:2200]    Intake/Output Summary (Last 24 hours) at 4/9/2020 0823  Last data filed at 4/9/2020 0623  Gross per 24 hour   Intake 144 ml   Output 1800 ml   Net -1656 ml       Physical Exam:  Elderly wf.  Oral mucosa with signif dried blood inside and outside.    Raises eyebrows. More eye contact and trying to answer questions .  Neck no jvd. THERESA Upton day 7  Heart RRR No s3 or rub  Lungs clear to auscultation, no wheezing. Unlabored.   Abd + bs, soft, no guarding or rebound  Ext 3+ upper and 2+lower ext edema.     SKin scattered ecchymoses      Results Review:    Results from last 7 days   Lab Units 04/09/20  0552 04/08/20  0533 04/07/20  0625   SODIUM mmol/L 146* 145 139   POTASSIUM mmol/L 3.2* 3.2* 3.3*   CHLORIDE mmol/L 107 106 101   CO2 mmol/L 23.7 23.8 24.0   BUN mg/dL 35* 35* 27*   CREATININE mg/dL 1.21* 1.38* 1.35*   CALCIUM mg/dL 8.5* 8.6 8.2*   BILIRUBIN mg/dL 1.5* 1.7* 1.8*   ALK PHOS U/L 297* 330* 341*   ALT (SGPT) U/L 24 29 31   AST (SGOT) U/L 27 27 31   GLUCOSE mg/dL 188* 171* 150*       Estimated Creatinine Clearance: " 35.5 mL/min (A) (by C-G formula based on SCr of 1.21 mg/dL (H)).    Results from last 7 days   Lab Units 04/09/20  0552 04/08/20  0533 04/07/20  0625 04/06/20  0525 04/05/20  0509   MAGNESIUM mg/dL 1.5* 1.6 1.7 1.8 1.9   PHOSPHORUS mg/dL  --   --  2.9 3.2 3.9             Results from last 7 days   Lab Units 04/09/20  0552 04/08/20  0533 04/07/20  0625 04/06/20  1930 04/06/20  0525  04/05/20  0510   WBC 10*3/mm3 6.73 6.39 7.10  --  8.40  --  8.70   HEMOGLOBIN g/dL 9.4* 9.8* 9.7* 10.6* 10.1*   < > 9.3*   PLATELETS 10*3/mm3 233 188 172  --  175  --  127*    < > = values in this interval not displayed.       Results from last 7 days   Lab Units 04/05/20  0509 04/03/20  0517   INR  1.35* 1.68*         Imaging Results (Last 24 Hours)     ** No results found for the last 24 hours. **          insulin lispro 0-7 Units Subcutaneous 4x Daily With Meals & Nightly   magnesium sulfate 1 g Intravenous Once   pantoprazole 40 mg Intravenous BID AC   potassium chloride 20 mEq Intravenous Once       dilTIAZem 5-15 mg/hr Last Rate: 10 mg/hr (04/08/20 2320)   niCARdipine 5-15 mg/hr Last Rate: Stopped (04/08/20 1557)       Medication Review:   Current Facility-Administered Medications   Medication Dose Route Frequency Provider Last Rate Last Dose   • acetaminophen (TYLENOL) tablet 650 mg  650 mg Oral Q4H PRN Boogie Saavedra MD        Or   • acetaminophen (TYLENOL) suppository 650 mg  650 mg Rectal Q4H PRN Boogie Saavedra MD       • dextrose (D50W) 25 g/ 50mL Intravenous Solution 25 g  25 g Intravenous Q15 Min PRN Boogie Saavedra MD   25 g at 03/30/20 0033   • dextrose (GLUTOSE) oral gel 15 g  15 g Oral Q15 Min PRN Boogie Saavedra MD       • dilTIAZem (CARDIZEM) 100 mg in 100 mL NS (1 mg/mL) infusion  5-15 mg/hr Intravenous Titrated Lucy Marinelli APRN 10 mL/hr at 04/08/20 2320 10 mg/hr at 04/08/20 2320   • glucagon (human recombinant) (GLUCAGEN DIAGNOSTIC) injection 1 mg  1 mg Subcutaneous Q15 Min PRN Boogie Saavedra MD       •  hydrALAZINE (APRESOLINE) injection 20 mg  20 mg Intravenous Q6H PRN Chandana Kapoor MD   20 mg at 04/09/20 0753   • insulin lispro (humaLOG) injection 0-7 Units  0-7 Units Subcutaneous 4x Daily With Meals & Nightly Chandana Kapoor MD   2 Units at 04/09/20 0753   • ipratropium-albuterol (DUO-NEB) nebulizer solution 3 mL  3 mL Nebulization Q2H PRN Boogie Saavedra MD       • magic mouthwash oral supsension 15 mL  15 mL Swish & Spit Q4H PRN Mario Saavedra MD   15 mL at 04/01/20 2022   • magnesium sulfate in D5W 1g/100mL (PREMIX)  1 g Intravenous Once Emily Davis MD       • metoprolol tartrate (LOPRESSOR) injection 5 mg  5 mg Intravenous Q6H PRN Roni Gutierrez DO       • niCARdipine (CARDENE) 25 mg in 250 mL NS (0.1 mg/mL) infusion kit  5-15 mg/hr Intravenous Titrated Veronika Perez MD   Stopped at 04/08/20 1557   • ondansetron (ZOFRAN) tablet 4 mg  4 mg Oral Q6H PRN Boogie Saavedra MD        Or   • ondansetron (ZOFRAN) injection 4 mg  4 mg Intravenous Q6H PRN Boogie Saavedra MD       • pantoprazole (PROTONIX) injection 40 mg  40 mg Intravenous BID AC Chandana Kapoor MD   40 mg at 04/09/20 0552   • potassium chloride 20 mEq in 50 mL IVPB  20 mEq Intravenous Once Emily Davis MD           Assessment/Plan   1. KIERAN, non-oliguric.  Resolved.  HD last on 4.6.   Her creatinine is 1.2 today . I think KIERAN is resolving.  Still has shiley.  Will need to keep it for now for IV access as her arms are too edematous for any other IV access. Hypomagnesemia likely due to PPI for GI bleed.  Replace K and Mg IV. Mild hypernatremia. Watch closely.  Not taking po, but clearly   2. GI bleed, hemorrhagic shock.  Duodenal ulcer, erosive gastritis on EGD. Hg stable .  3. Anemia of blood loss, GIB, oral mucosal bleeding.  4. Left subacute left MCA CVA. Aphasia .  5. Dementia  6. HTN.  On cardizem drip for PAF.  Add po bystolic if she will take anything po.    7. DM2  8. Nutrition.  Not taking any  po. Albumin 2.6.  If family does not want palliative, will need feeding tube.   9. PAF    I still think that Palliative care is the next step.  Waiting on family decision .    Emily Davis MD  04/09/20  08:23

## 2020-04-09 NOTE — PLAN OF CARE
Restraints remain in use to prevent pulling tubes and IV,s, Med with hydralazine x 1 for elevated B/p with good results, continue in Afib, rate controlled remains on  Cardizem drip. Eyes closed at short intervals, will continue to monitor

## 2020-04-10 LAB
ALBUMIN SERPL-MCNC: 3 G/DL (ref 3.5–5.2)
ALBUMIN/GLOB SERPL: 1.1 G/DL
ALP SERPL-CCNC: 282 U/L (ref 39–117)
ALT SERPL W P-5'-P-CCNC: 26 U/L (ref 1–33)
ANION GAP SERPL CALCULATED.3IONS-SCNC: 14.4 MMOL/L (ref 5–15)
AST SERPL-CCNC: 23 U/L (ref 1–32)
BASOPHILS # BLD AUTO: 0.02 10*3/MM3 (ref 0–0.2)
BASOPHILS NFR BLD AUTO: 0.3 % (ref 0–1.5)
BILIRUB SERPL-MCNC: 1.5 MG/DL (ref 0.2–1.2)
BUN BLD-MCNC: 36 MG/DL (ref 8–23)
BUN/CREAT SERPL: 34.3 (ref 7–25)
CALCIUM SPEC-SCNC: 9 MG/DL (ref 8.6–10.5)
CHLORIDE SERPL-SCNC: 107 MMOL/L (ref 98–107)
CO2 SERPL-SCNC: 25.6 MMOL/L (ref 22–29)
CREAT BLD-MCNC: 1.05 MG/DL (ref 0.57–1)
DEPRECATED RDW RBC AUTO: 47.7 FL (ref 37–54)
EOSINOPHIL # BLD AUTO: 0.12 10*3/MM3 (ref 0–0.4)
EOSINOPHIL NFR BLD AUTO: 1.7 % (ref 0.3–6.2)
ERYTHROCYTE [DISTWIDTH] IN BLOOD BY AUTOMATED COUNT: 15.1 % (ref 12.3–15.4)
GFR SERPL CREATININE-BSD FRML MDRD: 51 ML/MIN/1.73
GLOBULIN UR ELPH-MCNC: 2.8 GM/DL
GLUCOSE BLD-MCNC: 203 MG/DL (ref 65–99)
GLUCOSE BLDC GLUCOMTR-MCNC: 165 MG/DL (ref 70–130)
GLUCOSE BLDC GLUCOMTR-MCNC: 172 MG/DL (ref 70–130)
GLUCOSE BLDC GLUCOMTR-MCNC: 181 MG/DL (ref 70–130)
GLUCOSE BLDC GLUCOMTR-MCNC: 230 MG/DL (ref 70–130)
HCT VFR BLD AUTO: 29.8 % (ref 34–46.6)
HGB BLD-MCNC: 9.7 G/DL (ref 12–15.9)
IMM GRANULOCYTES # BLD AUTO: 0.05 10*3/MM3 (ref 0–0.05)
IMM GRANULOCYTES NFR BLD AUTO: 0.7 % (ref 0–0.5)
LYMPHOCYTES # BLD AUTO: 0.34 10*3/MM3 (ref 0.7–3.1)
LYMPHOCYTES NFR BLD AUTO: 4.7 % (ref 19.6–45.3)
MAGNESIUM SERPL-MCNC: 1.8 MG/DL (ref 1.6–2.4)
MCH RBC QN AUTO: 29.1 PG (ref 26.6–33)
MCHC RBC AUTO-ENTMCNC: 32.6 G/DL (ref 31.5–35.7)
MCV RBC AUTO: 89.5 FL (ref 79–97)
MONOCYTES # BLD AUTO: 0.39 10*3/MM3 (ref 0.1–0.9)
MONOCYTES NFR BLD AUTO: 5.4 % (ref 5–12)
NEUTROPHILS # BLD AUTO: 6.24 10*3/MM3 (ref 1.7–7)
NEUTROPHILS NFR BLD AUTO: 87.2 % (ref 42.7–76)
NRBC BLD AUTO-RTO: 0 /100 WBC (ref 0–0.2)
PHOSPHATE SERPL-MCNC: 2.3 MG/DL (ref 2.5–4.5)
PLATELET # BLD AUTO: 258 10*3/MM3 (ref 140–450)
PMV BLD AUTO: 10.7 FL (ref 6–12)
POTASSIUM BLD-SCNC: 3.1 MMOL/L (ref 3.5–5.2)
PROT SERPL-MCNC: 5.8 G/DL (ref 6–8.5)
RBC # BLD AUTO: 3.33 10*6/MM3 (ref 3.77–5.28)
SODIUM BLD-SCNC: 147 MMOL/L (ref 136–145)
WBC NRBC COR # BLD: 7.16 10*3/MM3 (ref 3.4–10.8)

## 2020-04-10 PROCEDURE — 80053 COMPREHEN METABOLIC PANEL: CPT | Performed by: INTERNAL MEDICINE

## 2020-04-10 PROCEDURE — 85025 COMPLETE CBC W/AUTO DIFF WBC: CPT | Performed by: INTERNAL MEDICINE

## 2020-04-10 PROCEDURE — 83735 ASSAY OF MAGNESIUM: CPT | Performed by: NURSE PRACTITIONER

## 2020-04-10 PROCEDURE — 63710000001 INSULIN LISPRO (HUMAN) PER 5 UNITS: Performed by: INTERNAL MEDICINE

## 2020-04-10 PROCEDURE — 82962 GLUCOSE BLOOD TEST: CPT

## 2020-04-10 PROCEDURE — 84100 ASSAY OF PHOSPHORUS: CPT | Performed by: INTERNAL MEDICINE

## 2020-04-10 PROCEDURE — 25010000002 HYDRALAZINE PER 20 MG: Performed by: INTERNAL MEDICINE

## 2020-04-10 RX ADMIN — PANTOPRAZOLE SODIUM 40 MG: 40 INJECTION, POWDER, FOR SOLUTION INTRAVENOUS at 06:12

## 2020-04-10 RX ADMIN — NEBIVOLOL HYDROCHLORIDE 20 MG: 10 TABLET ORAL at 08:24

## 2020-04-10 RX ADMIN — SODIUM CHLORIDE 10 MG/HR: 900 INJECTION, SOLUTION INTRAVENOUS at 06:22

## 2020-04-10 RX ADMIN — HYDRALAZINE HYDROCHLORIDE 20 MG: 20 INJECTION INTRAMUSCULAR; INTRAVENOUS at 18:02

## 2020-04-10 RX ADMIN — INSULIN LISPRO 2 UNITS: 100 INJECTION, SOLUTION INTRAVENOUS; SUBCUTANEOUS at 08:24

## 2020-04-10 RX ADMIN — INSULIN LISPRO 2 UNITS: 100 INJECTION, SOLUTION INTRAVENOUS; SUBCUTANEOUS at 14:02

## 2020-04-10 RX ADMIN — INSULIN LISPRO 3 UNITS: 100 INJECTION, SOLUTION INTRAVENOUS; SUBCUTANEOUS at 21:24

## 2020-04-10 RX ADMIN — SODIUM CHLORIDE 10 MG/HR: 900 INJECTION, SOLUTION INTRAVENOUS at 14:59

## 2020-04-10 RX ADMIN — PANTOPRAZOLE SODIUM 40 MG: 40 INJECTION, POWDER, FOR SOLUTION INTRAVENOUS at 18:02

## 2020-04-10 RX ADMIN — INSULIN LISPRO 2 UNITS: 100 INJECTION, SOLUTION INTRAVENOUS; SUBCUTANEOUS at 18:29

## 2020-04-10 NOTE — NURSING NOTE
Received a pleasant phone call from patient's spouse. He stated that he is reaching out to me regarding our previous conversation about palliative care. He stated that he, his daughter, and his  would be making a decision in the next couple of days about whether to make a decision for comfort care or not. He wanted to notify us of this decision and wanted to see if we still had a room for her. I stated that when they decided to transition care to palliative care, he needed to notify his wife's MD as well as the nursing staff and we could proceed at that time. I will touch base with the spouse tomorrow to follow up and offer support as needed.

## 2020-04-10 NOTE — PROGRESS NOTES
"   LOS: 14 days    Patient Care Team:  Eric Matta MD as PCP - General (General Practice)    Chief Complaint:    Chief Complaint   Patient presents with   • Altered Mental Status   • Black or Bloody Stool     KIERAN  Subjective     Interval History:   Seen and examined. Aphasic, but does try to speak when asked questions.    Review of Systems:   Unable to obtain    Objective     Vital Signs  Temp:  [97.4 °F (36.3 °C)-98.6 °F (37 °C)] 97.4 °F (36.3 °C)  Heart Rate:  [62-70] 70  Resp:  [16-18] 16  BP: (164-194)/(68-77) 164/71    Flowsheet Rows      First Filed Value   Admission Height  165.1 cm (65\") Documented at 03/27/2020 1029   Admission Weight  60.3 kg (133 lb) Documented at 03/27/2020 1029          No intake/output data recorded.  I/O last 3 completed shifts:  In: 264 [P.O.:120; I.V.:144]  Out: 1350 [Urine:1350]    Intake/Output Summary (Last 24 hours) at 4/10/2020 0929  Last data filed at 4/10/2020 0602  Gross per 24 hour   Intake 120 ml   Output 800 ml   Net -680 ml       Physical Exam:  Elderly wf.  Oral mucosa with signif dried blood inside and outside.    Raises eyebrows. More eye contact and trying to answer questions .  Neck no jvd. THERESA Upton day 7  Heart RRR No s3 or rub  Lungs clear to auscultation, no wheezing. Unlabored.   Abd + bs, soft, no guarding or rebound  Ext 3+ upper and 2+lower ext edema.     SKin scattered ecchymoses      Results Review:    Results from last 7 days   Lab Units 04/10/20  0613 04/09/20  1218 04/09/20  0552 04/08/20  0533   SODIUM mmol/L 147*  --  146* 145   POTASSIUM mmol/L 3.1* 3.5 3.2* 3.2*   CHLORIDE mmol/L 107  --  107 106   CO2 mmol/L 25.6  --  23.7 23.8   BUN mg/dL 36*  --  35* 35*   CREATININE mg/dL 1.05*  --  1.21* 1.38*   CALCIUM mg/dL 9.0  --  8.5* 8.6   BILIRUBIN mg/dL 1.5*  --  1.5* 1.7*   ALK PHOS U/L 282*  --  297* 330*   ALT (SGPT) U/L 26  --  24 29   AST (SGOT) U/L 23  --  27 27   GLUCOSE mg/dL 203*  --  188* 171*       Estimated Creatinine Clearance: 41 mL/min " (A) (by C-G formula based on SCr of 1.05 mg/dL (H)).    Results from last 7 days   Lab Units 04/10/20  0613 04/09/20  0552 04/08/20  0533 04/07/20  0625 04/06/20  0525   MAGNESIUM mg/dL 1.8 1.5* 1.6 1.7 1.8   PHOSPHORUS mg/dL 2.3*  --   --  2.9 3.2             Results from last 7 days   Lab Units 04/10/20  0613 04/09/20  0552 04/08/20  0533 04/07/20  0625 04/06/20  1930 04/06/20  0525   WBC 10*3/mm3 7.16 6.73 6.39 7.10  --  8.40   HEMOGLOBIN g/dL 9.7* 9.4* 9.8* 9.7* 10.6* 10.1*   PLATELETS 10*3/mm3 258 233 188 172  --  175       Results from last 7 days   Lab Units 04/05/20  0509   INR  1.35*         Imaging Results (Last 24 Hours)     ** No results found for the last 24 hours. **          insulin lispro 0-7 Units Subcutaneous 4x Daily With Meals & Nightly   nebivolol 20 mg Oral Q24H   pantoprazole 40 mg Intravenous BID AC       dilTIAZem 5-15 mg/hr Last Rate: 10 mg/hr (04/10/20 0622)   niCARdipine 5-15 mg/hr Last Rate: Stopped (04/08/20 1557)       Medication Review:   Current Facility-Administered Medications   Medication Dose Route Frequency Provider Last Rate Last Dose   • acetaminophen (TYLENOL) tablet 650 mg  650 mg Oral Q4H PRN Boogie Saavedra MD        Or   • acetaminophen (TYLENOL) suppository 650 mg  650 mg Rectal Q4H PRN Boogie Saavedra MD       • dextrose (D50W) 25 g/ 50mL Intravenous Solution 25 g  25 g Intravenous Q15 Min PRN Boogie Saavedra MD   25 g at 03/30/20 0033   • dextrose (GLUTOSE) oral gel 15 g  15 g Oral Q15 Min PRN Boogie Saavedra MD       • dilTIAZem (CARDIZEM) 100 mg in 100 mL NS (1 mg/mL) infusion  5-15 mg/hr Intravenous Titrated Lucy Marinelli APRN 10 mL/hr at 04/10/20 0622 10 mg/hr at 04/10/20 0622   • glucagon (human recombinant) (GLUCAGEN DIAGNOSTIC) injection 1 mg  1 mg Subcutaneous Q15 Min PRN Boogie Saavedra MD       • hydrALAZINE (APRESOLINE) injection 20 mg  20 mg Intravenous Q6H PRN Chandana Kapoor MD   20 mg at 04/09/20 1443   • insulin lispro (humaLOG) injection  0-7 Units  0-7 Units Subcutaneous 4x Daily With Meals & Nightly Chandana Kapoor MD   2 Units at 04/10/20 0824   • ipratropium-albuterol (DUO-NEB) nebulizer solution 3 mL  3 mL Nebulization Q2H PRN Boogie Saavedra MD       • magic mouthwash oral supsension 15 mL  15 mL Swish & Spit Q4H PRN Mario Saavedra MD   15 mL at 04/01/20 2022   • metoprolol tartrate (LOPRESSOR) injection 5 mg  5 mg Intravenous Q6H PRN Roni Gutierrez DO       • nebivolol (BYSTOLIC) tablet 20 mg  20 mg Oral Q24H Emily Davis MD   20 mg at 04/10/20 0824   • niCARdipine (CARDENE) 25 mg in 250 mL NS (0.1 mg/mL) infusion kit  5-15 mg/hr Intravenous Titrated Veronika Perez MD   Stopped at 04/08/20 1557   • ondansetron (ZOFRAN) tablet 4 mg  4 mg Oral Q6H PRN Boogie Saavedra MD        Or   • ondansetron (ZOFRAN) injection 4 mg  4 mg Intravenous Q6H PRN Boogie Saavedra MD       • pantoprazole (PROTONIX) injection 40 mg  40 mg Intravenous BID AC Chandana Kapoor MD   40 mg at 04/10/20 0612       Assessment/Plan   1. KIERAN, non-oliguric.  Resolved.  HD last on 4.6.   Her creatinine is 1..05  KIERAN is resolving.  Still has shiley.  Will need to keep it for now for IV access as her arms are too edematous for any other IV access.  Hopefully palliative care today  2. GI bleed, hemorrhagic shock.  Duodenal ulcer, erosive gastritis on EGD. Hg stable .  3. Anemia of blood loss, GIB, oral mucosal bleeding.  4. Left subacute left MCA CVA. Aphasia .  5. Dementia  6. HTN.  On cardizem drip for PAF.  Add po bystolic if she will take anything po.    7. DM2  8. Nutrition.  Not taking any po. Albumin 2.6.    9. PAF      Vish Rodriguez MD  04/10/20  09:29

## 2020-04-10 NOTE — PLAN OF CARE
Pt resting comfortably. Oral care provided, pt cont to bite on swab when doing oral care. Cardizem gtt as ordered. VSS cont to monitor  Problem: Fall Risk (Adult)  Goal: Absence of Fall  Outcome: Ongoing (interventions implemented as appropriate)  Flowsheets (Taken 4/3/2020 7335 by Nuvia Lucero, RN)  Absence of Fall: making progress toward outcome     Problem: Restraint, Nonbehavioral (Nonviolent)  Goal: Rationale and Justification  Outcome: Ongoing (interventions implemented as appropriate)  Goal: Nonbehavioral (Nonviolent) Restraint: Absence of Injury/Harm  Outcome: Ongoing (interventions implemented as appropriate)  Goal: Nonbehavioral (Nonviolent) Restraint: Achievement of Discontinuation Criteria  Outcome: Ongoing (interventions implemented as appropriate)  Goal: Nonbehavioral (Nonviolent) Restraint: Preservation of Dignity and Wellbeing  Outcome: Ongoing (interventions implemented as appropriate)     Problem: Renal Failure/Kidney Injury, Acute (Adult)  Goal: Signs and Symptoms of Listed Potential Problems Will be Absent, Minimized or Managed (Renal Failure/Kidney Injury, Acute)  Outcome: Ongoing (interventions implemented as appropriate)     Problem: Pain, Acute (Adult)  Goal: Acceptable Pain Control/Comfort Level  Outcome: Ongoing (interventions implemented as appropriate)     Problem: Gastrointestinal Bleeding (Adult)  Goal: Signs and Symptoms of Listed Potential Problems Will be Absent, Minimized or Managed (Gastrointestinal Bleeding)  Outcome: Ongoing (interventions implemented as appropriate)     Problem: Skin Injury Risk (Adult)  Goal: Skin Health and Integrity  Outcome: Ongoing (interventions implemented as appropriate)     Problem: Patient Care Overview  Goal: Plan of Care Review  Outcome: Ongoing (interventions implemented as appropriate)  Goal: Individualization and Mutuality  Outcome: Ongoing (interventions implemented as appropriate)  Goal: Discharge Needs Assessment  Outcome: Ongoing  (interventions implemented as appropriate)  Goal: Interprofessional Rounds/Family Conf  Outcome: Ongoing (interventions implemented as appropriate)

## 2020-04-10 NOTE — PLAN OF CARE
Pt IJ d/c'd. Hemodialysis cath still in place. IV in right upper arm with cardizem cont. BP better than yesterday. Bystolic given PO. No hydralazine PRN today. Oral care done, still dry, scabby and bloody. Still in restraints as of now, awaiting order to renew or d/c. VSS. Will cont to monitor pt.      Problem: Fall Risk (Adult)  Goal: Absence of Fall  Outcome: Ongoing (interventions implemented as appropriate)     Problem: Restraint, Nonbehavioral (Nonviolent)  Goal: Rationale and Justification  Outcome: Ongoing (interventions implemented as appropriate)  Goal: Nonbehavioral (Nonviolent) Restraint: Absence of Injury/Harm  Outcome: Ongoing (interventions implemented as appropriate)  Goal: Nonbehavioral (Nonviolent) Restraint: Achievement of Discontinuation Criteria  Outcome: Ongoing (interventions implemented as appropriate)  Goal: Nonbehavioral (Nonviolent) Restraint: Preservation of Dignity and Wellbeing  Outcome: Ongoing (interventions implemented as appropriate)     Problem: Renal Failure/Kidney Injury, Acute (Adult)  Goal: Signs and Symptoms of Listed Potential Problems Will be Absent, Minimized or Managed (Renal Failure/Kidney Injury, Acute)  Outcome: Ongoing (interventions implemented as appropriate)     Problem: Pain, Acute (Adult)  Goal: Acceptable Pain Control/Comfort Level  Outcome: Ongoing (interventions implemented as appropriate)     Problem: Gastrointestinal Bleeding (Adult)  Goal: Signs and Symptoms of Listed Potential Problems Will be Absent, Minimized or Managed (Gastrointestinal Bleeding)  Outcome: Ongoing (interventions implemented as appropriate)     Problem: Skin Injury Risk (Adult)  Goal: Skin Health and Integrity  Outcome: Ongoing (interventions implemented as appropriate)     Problem: Patient Care Overview  Goal: Plan of Care Review  Outcome: Ongoing (interventions implemented as appropriate)  Goal: Individualization and Mutuality  Outcome: Ongoing (interventions implemented as  appropriate)  Goal: Discharge Needs Assessment  Outcome: Ongoing (interventions implemented as appropriate)  Goal: Interprofessional Rounds/Family Conf  Outcome: Ongoing (interventions implemented as appropriate)

## 2020-04-10 NOTE — PROGRESS NOTES
Adult Nutrition  Assessment/PES    Patient Name:  Darby Workman  YOB: 1945  MRN: [id number]  Admit Date:  3/27/2020    Assessment Date:  4/10/2020    Comments:  Nutrition follow up.  Continues with minimal PO intake.  Somnolent.  Aphasic.  Still awaiting decision from family regarding palliative.    Due to the COVID pandemic, nutrition assessment completed based on review of electronic medical record.  This RD currently working remotely.  Can be reached at [phone], via secure chat or email.    Will continue to follow for POC.    Reason for Assessment     Row Name 04/10/20 1418          Reason for Assessment    Reason For Assessment  follow-up protocol         Nutrition/Diet History     Row Name 04/10/20 1419          Nutrition/Diet History    Typical Food/Fluid Intake  continues with minimal PO intake         Anthropometrics     Row Name 04/10/20 1419          Admit Weight    Admit Weight  -- 125# 4/10        Body Mass Index (BMI)    BMI Assessment  BMI 18.5-24.9: normal         Labs/Tests/Procedures/Meds     Row Name 04/10/20 1419          Labs/Procedures/Meds    Lab Results Reviewed  reviewed, pertinent        Diagnostic Tests/Procedures    Diagnostic Test/Procedure Reviewed  reviewed, pertinent        Medications    Pertinent Medications Reviewed  reviewed, pertinent         Nutrition Prescription Ordered     Row Name 04/10/20 1419          Nutrition Prescription PO    Current PO Diet  Pureed     Fluid Consistency  Nectar/syrup thick     Supplement  Boost Pudding (Ensure Pudding)     Supplement Frequency  3 times a day     Common Modifiers  Cardiac         Evaluation of Received Nutrient/Fluid Intake     Row Name 04/10/20 1420          PO Evaluation    Number of Meals  3     % PO Intake  17         Problem/Interventions:  Problem 1     Row Name 04/10/20 1420          Nutrition Diagnoses Problem 1    Problem 1  Inadequate Intake/Infusion     Inadequate Intake Type  Oral     Macronutrient   Kcal;Protein     Etiology (related to)  Medical Diagnosis;Factors Affecting Nutrition     Mental State/Condition  Impaired Cognitive Status/Motor Control;Confusion     Signs/Symptoms (evidenced by)  PO Intake;Report/Observation     Percent (%) intake recorded  17 %     Over number of meals  3         Intervention Goal     Row Name 04/10/20 1421          Intervention Goal    General  Maintain nutrition;Palliative Care;Meet nutritional needs for age/condition;Disease management/therapy;Reduce/improve symptoms awaiting family decision regarding palliative     PO  Tolerate PO;Increase intake;PO intake (%)     PO Intake %  75 %     Weight  Maintain weight         Nutrition Intervention     Row Name 04/10/20 1426          Nutrition Intervention    RD/Tech Action  Follow Tx progress;Care plan reviewd     Recommended/Ordered  Supplement         Education/Evaluation     Row Name 04/10/20 1426          Education    Education  Will Instruct as appropriate        Monitor/Evaluation    Monitor  Per protocol;PO intake;Supplement intake;Pertinent labs;Weight;Skin status;Symptoms     Education Follow-up  Reinforce PRN           Electronically signed by:  Marialuisa Smith RD  04/10/20 14:27

## 2020-04-10 NOTE — PROGRESS NOTES
Name: Darby Workman ADMIT: 3/27/2020   : 1945  PCP: Eric Matta MD    MRN: [id number] LOS: 14 days   AGE/SEX: 75 y.o. female  ROOM: [facility]     Subjective   Subjective   patient a little more alert today, opening eyes.      Review of Systems   Unable to perform ROS: Dementia   All other systems reviewed and are negative.       Objective   Objective   Vital Signs  Temp:  [97.4 °F (36.3 °C)-98.6 °F (37 °C)] 97.4 °F (36.3 °C)  Heart Rate:  [62-71] 70  Resp:  [16-18] 16  BP: (164-194)/(65-77) 164/71  SpO2:  [97 %] 97 %  on   ;   Device (Oxygen Therapy): room air  Body mass index is 20.9 kg/m².  Physical Exam   Constitutional: She appears ill. No distress.   elderly, frail   Cardiovascular: Normal rate and regular rhythm.   Pulmonary/Chest: Effort normal. No respiratory distress.   Psychiatric: Cognition and memory are impaired. She expresses inappropriate judgment. She is noncommunicative.   Nursing note and vitals reviewed.      Results Review:       I reviewed the patient's new clinical results.  Results from last 7 days   Lab Units 04/10/20  0613 20  0552 20  0533 20  0625   WBC 10*3/mm3 7.16 6.73 6.39 7.10   HEMOGLOBIN g/dL 9.7* 9.4* 9.8* 9.7*   PLATELETS 10*3/mm3 258 233 188 172     Results from last 7 days   Lab Units 04/10/20  0613 20  1218 20  0552 20  0533 20  0625   SODIUM mmol/L 147*  --  146* 145 139   POTASSIUM mmol/L 3.1* 3.5 3.2* 3.2* 3.3*   CHLORIDE mmol/L 107  --  107 106 101   CO2 mmol/L 25.6  --  23.7 23.8 24.0   BUN mg/dL 36*  --  35* 35* 27*   CREATININE mg/dL 1.05*  --  1.21* 1.38* 1.35*   GLUCOSE mg/dL 203*  --  188* 171* 150*   Estimated Creatinine Clearance: 41 mL/min (A) (by C-G formula based on SCr of 1.05 mg/dL (H)).  Results from last 7 days   Lab Units 04/10/20  0613 20  0552 20  0533 20  0625   ALBUMIN g/dL 3.00* 2.60* 2.50* 2.70*   BILIRUBIN mg/dL 1.5* 1.5* 1.7* 1.8*   ALK PHOS U/L 282* 297* 330* 341*   AST (SGOT)  U/L 23 27 27 31   ALT (SGPT) U/L 26 24 29 31     Results from last 7 days   Lab Units 04/10/20  0613 04/09/20  0552 04/08/20  0533 04/07/20  0625 04/06/20  0525 04/05/20  0509   CALCIUM mg/dL 9.0 8.5* 8.6 8.2* 8.5* 8.3*   ALBUMIN g/dL 3.00* 2.60* 2.50* 2.70* 2.50* 2.40*   MAGNESIUM mg/dL 1.8 1.5* 1.6 1.7 1.8 1.9   PHOSPHORUS mg/dL 2.3*  --   --  2.9 3.2 3.9       Glucose   Date/Time Value Ref Range Status   04/10/2020 0652 181 (H) 70 - 130 mg/dL Final   04/09/2020 2133 190 (H) 70 - 130 mg/dL Final   04/09/2020 1615 159 (H) 70 - 130 mg/dL Final   04/09/2020 1129 180 (H) 70 - 130 mg/dL Final   04/09/2020 0551 187 (H) 70 - 130 mg/dL Final   04/08/2020 2031 150 (H) 70 - 130 mg/dL Final   04/08/2020 1606 145 (H) 70 - 130 mg/dL Final         insulin lispro 0-7 Units Subcutaneous 4x Daily With Meals & Nightly   nebivolol 20 mg Oral Q24H   pantoprazole 40 mg Intravenous BID AC       dilTIAZem 5-15 mg/hr Last Rate: 10 mg/hr (04/10/20 0622)   niCARdipine 5-15 mg/hr Last Rate: Stopped (04/08/20 1557)   Diet Pureed; Nectar / Syrup Thick; Cardiac       Assessment/Plan     Active Hospital Problems    Diagnosis  POA   • **Upper GI bleed [K92.2]  Yes   • Uremic encephalopathy [G93.41, N19]  Yes   • Acute kidney injury (CMS/HCC) [N17.9]  Yes   • Acute pancreatitis [K85.90]  Yes   • Hemorrhagic shock (CMS/HCC) [R57.8]  Yes   • Thrombocytopenia (CMS/HCC) [D69.6]  Yes   • Type 2 diabetes mellitus with hyperglycemia (CMS/HCC) [E11.65]  Yes   • Dementia without behavioral disturbance (CMS/HCC) [F03.90]  Yes   • Acute posthemorrhagic anemia [D62]  Yes   • Hypertension [I10]  Yes   • Hyperlipidemia [E78.5]  Yes      Resolved Hospital Problems   No resolved problems to display.     GIB/Hemorrhagic Shock/ICU admission: Required transfusion and Kcentra. Duodenal Ulcer and erosive gastritis noted on EGD. Anticoagulation held. Also has oral component of blood loss as well due to erosions of oral mucosa. GI, Surgery, and Oncology evaluated. All  have recommended palliative care given her poor prognosis and multiorgan failure. Has continued oral oozing and had additional rectal bleed few days ago. Hgb is stable today. BID IV protonix.     CVA: subacute left MCA CVA. Subacute based on CT and aphasia. Neurology evaluated. She is not AC or antiplatelet candidate at this time. She had this even while she was on Eliquis and aspirin prior to admission. Aphasic speech. SLP has started modified diet based on their eval 4/6 but pt not interested in eating.    Dementia with metabolic encephalopathy- unchanged     Leukocytosis: Reactive in nature. No antibiotics indicated. Infectious Disease has evaluated.    AFib: on Cardizem gtt and HR stable. Cardiology following.    HTN: on cardene gtt- BP still running high but better today    TCP: resolved    KIERAN: Dialysis intiated. Nephrology following. Replacing electrolytes.    Disposition: Poor prognosis. Awaiting family decision for possible transfer to palliative.        GETACHEW Arredondo  [facility]  04/10/20  09:01

## 2020-04-11 LAB
GLUCOSE BLDC GLUCOMTR-MCNC: 165 MG/DL (ref 70–130)
GLUCOSE BLDC GLUCOMTR-MCNC: 178 MG/DL (ref 70–130)
GLUCOSE BLDC GLUCOMTR-MCNC: 225 MG/DL (ref 70–130)
GLUCOSE BLDC GLUCOMTR-MCNC: 248 MG/DL (ref 70–130)

## 2020-04-11 PROCEDURE — 82962 GLUCOSE BLOOD TEST: CPT

## 2020-04-11 PROCEDURE — 63710000001 INSULIN LISPRO (HUMAN) PER 5 UNITS: Performed by: INTERNAL MEDICINE

## 2020-04-11 PROCEDURE — 25010000002 HYDRALAZINE PER 20 MG: Performed by: INTERNAL MEDICINE

## 2020-04-11 RX ADMIN — INSULIN LISPRO 3 UNITS: 100 INJECTION, SOLUTION INTRAVENOUS; SUBCUTANEOUS at 08:53

## 2020-04-11 RX ADMIN — INSULIN LISPRO 2 UNITS: 100 INJECTION, SOLUTION INTRAVENOUS; SUBCUTANEOUS at 12:20

## 2020-04-11 RX ADMIN — SODIUM CHLORIDE 10 MG/HR: 900 INJECTION, SOLUTION INTRAVENOUS at 13:00

## 2020-04-11 RX ADMIN — INSULIN LISPRO 2 UNITS: 100 INJECTION, SOLUTION INTRAVENOUS; SUBCUTANEOUS at 22:21

## 2020-04-11 RX ADMIN — SODIUM CHLORIDE 5 MG/HR: 900 INJECTION, SOLUTION INTRAVENOUS at 01:50

## 2020-04-11 RX ADMIN — HYDRALAZINE HYDROCHLORIDE 20 MG: 20 INJECTION INTRAMUSCULAR; INTRAVENOUS at 03:40

## 2020-04-11 RX ADMIN — HYDRALAZINE HYDROCHLORIDE 20 MG: 20 INJECTION INTRAMUSCULAR; INTRAVENOUS at 22:27

## 2020-04-11 RX ADMIN — PANTOPRAZOLE SODIUM 40 MG: 40 INJECTION, POWDER, FOR SOLUTION INTRAVENOUS at 07:07

## 2020-04-11 RX ADMIN — INSULIN LISPRO 3 UNITS: 100 INJECTION, SOLUTION INTRAVENOUS; SUBCUTANEOUS at 17:09

## 2020-04-11 RX ADMIN — NEBIVOLOL HYDROCHLORIDE 20 MG: 10 TABLET ORAL at 11:24

## 2020-04-11 RX ADMIN — PANTOPRAZOLE SODIUM 40 MG: 40 INJECTION, POWDER, FOR SOLUTION INTRAVENOUS at 17:09

## 2020-04-11 RX ADMIN — SODIUM CHLORIDE 10 MG/HR: 900 INJECTION, SOLUTION INTRAVENOUS at 23:21

## 2020-04-11 NOTE — PROGRESS NOTES
Name: Darby Workman ADMIT: 3/27/2020   : 1945  PCP: Eric Matta MD    MRN: [id number] LOS: 15 days   AGE/SEX: 75 y.o. female  ROOM: [facility]     Subjective   Subjective   patient a little more alert today, opening eyes.      Review of Systems   Unable to perform ROS: Dementia        Objective   Objective   Vital Signs  Temp:  [97.4 °F (36.3 °C)-97.7 °F (36.5 °C)] 97.7 °F (36.5 °C)  Heart Rate:  [60-70] 70  Resp:  [16-18] 16  BP: (146-219)/(63-89) 170/71  SpO2:  [98 %-100 %] 99 %  on  Flow (L/min):  [1-2] 2;   Device (Oxygen Therapy): nasal cannula  Body mass index is 21.09 kg/m².       Physical Exam   Constitutional: She appears well-developed. She appears ill. No distress.   elderly, frail   HENT:   Oral lesions on lips with purulent drainage noted.    Eyes: Right eye exhibits no discharge. Left eye exhibits no discharge. No scleral icterus.   Neck: No JVD present.   Cardiovascular: Normal rate and regular rhythm.   Pulmonary/Chest: Effort normal and breath sounds normal.   Abdominal: Soft.   hypoactive   Neurological:   aphasia   Skin: Skin is warm and dry. There is pallor.   shiley to left neck.    Psychiatric: Cognition and memory are impaired. She expresses inappropriate judgment. She is noncommunicative.   Nursing note and vitals reviewed.      Results Review:       I reviewed the patient's new clinical results.  Results from last 7 days   Lab Units 04/10/20  0613 20  0552 20  0533 20  0625   WBC 10*3/mm3 7.16 6.73 6.39 7.10   HEMOGLOBIN g/dL 9.7* 9.4* 9.8* 9.7*   PLATELETS 10*3/mm3 258 233 188 172     Results from last 7 days   Lab Units 04/10/20  0613 20  1218 20  0552 20  0533 20  0625   SODIUM mmol/L 147*  --  146* 145 139   POTASSIUM mmol/L 3.1* 3.5 3.2* 3.2* 3.3*   CHLORIDE mmol/L 107  --  107 106 101   CO2 mmol/L 25.6  --  23.7 23.8 24.0   BUN mg/dL 36*  --  35* 35* 27*   CREATININE mg/dL 1.05*  --  1.21* 1.38* 1.35*   GLUCOSE mg/dL 203*  --  188*  171* 150*   Estimated Creatinine Clearance: 41.4 mL/min (A) (by C-G formula based on SCr of 1.05 mg/dL (H)).  Results from last 7 days   Lab Units 04/10/20  0613 04/09/20  0552 04/08/20  0533 04/07/20  0625   ALBUMIN g/dL 3.00* 2.60* 2.50* 2.70*   BILIRUBIN mg/dL 1.5* 1.5* 1.7* 1.8*   ALK PHOS U/L 282* 297* 330* 341*   AST (SGOT) U/L 23 27 27 31   ALT (SGPT) U/L 26 24 29 31     Results from last 7 days   Lab Units 04/10/20  0613 04/09/20  0552 04/08/20  0533 04/07/20  0625 04/06/20  0525 04/05/20  0509   CALCIUM mg/dL 9.0 8.5* 8.6 8.2* 8.5* 8.3*   ALBUMIN g/dL 3.00* 2.60* 2.50* 2.70* 2.50* 2.40*   MAGNESIUM mg/dL 1.8 1.5* 1.6 1.7 1.8 1.9   PHOSPHORUS mg/dL 2.3*  --   --  2.9 3.2 3.9       Glucose   Date/Time Value Ref Range Status   04/11/2020 0604 225 (H) 70 - 130 mg/dL Final   04/10/2020 2106 230 (H) 70 - 130 mg/dL Final   04/10/2020 1810 165 (H) 70 - 130 mg/dL Final   04/10/2020 1127 172 (H) 70 - 130 mg/dL Final   04/10/2020 0652 181 (H) 70 - 130 mg/dL Final   04/09/2020 2133 190 (H) 70 - 130 mg/dL Final   04/09/2020 1615 159 (H) 70 - 130 mg/dL Final         insulin lispro 0-7 Units Subcutaneous 4x Daily With Meals & Nightly   nebivolol 20 mg Oral Q24H   pantoprazole 40 mg Intravenous BID AC       dilTIAZem 5-15 mg/hr Last Rate: 10 mg/hr (04/11/20 0337)   niCARdipine 5-15 mg/hr Last Rate: Stopped (04/08/20 4317)   Diet Pureed; Nectar / Syrup Thick; Cardiac       Assessment/Plan     Active Hospital Problems    Diagnosis  POA   • **Upper GI bleed [K92.2]  Yes   • Uremic encephalopathy [G93.41, N19]  Yes   • Acute kidney injury (CMS/HCC) [N17.9]  Yes   • Acute pancreatitis [K85.90]  Yes   • Hemorrhagic shock (CMS/HCC) [R57.8]  Yes   • Thrombocytopenia (CMS/HCC) [D69.6]  Yes   • Type 2 diabetes mellitus with hyperglycemia (CMS/HCC) [E11.65]  Yes   • Dementia without behavioral disturbance (CMS/HCC) [F03.90]  Yes   • Acute posthemorrhagic anemia [D62]  Yes   • Hypertension [I10]  Yes   • Hyperlipidemia [E78.5]  Yes       Resolved Hospital Problems   No resolved problems to display.     GIB/Hemorrhagic Shock Required transfusion and Kcentra. Duodenal Ulcer and erosive gastritis noted on EGD. Anticoagulation held. Also has oral component of blood loss as well due to erosions of oral mucosa. GI, Surgery, and Oncology evaluated. All have recommended palliative care given her poor prognosis and multiorgan failure. Has continued oral oozing and had additional rectal bleed few days ago. Hgb was stable 4/10.  No lab draw today. Is on BID IV protonix.     CVA: subacute left MCA CVA. Subacute based on CT and aphasia. Neurology evaluated. She is not AC or antiplatelet candidate at this time. She had this even while she was on Eliquis and aspirin prior to admission. Aphasic speech. SLP has started modified diet based on their eval 4/6 but refusing dietary intake.     Dementia with metabolic encephalopathy- unchanged     Leukocytosis: resolved.. No antibiotics indicated. Infectious Disease has evaluated.    AFib: on Cardizem gtt and bisoprolol and is in SR. Cardiology following. HR 60-70's.     HTN: is off cardene gtt- BP still running high but responded to IV hydralazine.  remains on cardizem gtt. With spikes up to 219/89 and 192/82 otherwise 140-179/60-70's with HR 60-70's.     TCP: resolved    KIERAN: HS last on 4/6.  Has shiley in place.  Nephrology following last saw 4/9.  Creatinine 4/10 1.05 (1.21).  Continue restraint with shiley in place for safety.     DM:  FSBS 165-225.   Is on SS insulin with 2-3 units of coverage needed.        Disposition: Poor prognosis. Continue f/c for comfort while awaiting family decision regarding transfer to palliative.      BMP in am       GETACHEW Lewis  [facility]  04/11/20  10:40

## 2020-04-11 NOTE — PLAN OF CARE
Patient is resting well at this time and without any c/o pain or discomfort other than when she is turned, or when oral care is done because of her severely sore mouth and lips. Nursing is turning and repositioning her every two hours and her skin is pale with some dependent edema in the right upper arm. Lungs are diminished throughout without coughing noted, and abdomen is soft with hypoactive bowel sounds noted. F/C to BSD with dark yellow urine draining, and heel protectors applied to bilateral feet. She continues with Cardizem drip running related to A-fib and HTN, and did also receive a PRN dose of HTN medication related to B/P increasing after her Cardizem drip was titrated down for a while...

## 2020-04-11 NOTE — PROGRESS NOTES
"   LOS: 15 days    Patient Care Team:  Eric Matta MD as PCP - General (General Practice)    Chief Complaint:    Chief Complaint   Patient presents with   • Altered Mental Status   • Black or Bloody Stool     KIERAN  Subjective     Interval History:   Eating a little bit with much encouragement from the aide; denies shortness of breath; complains of lip soreness.  Diltiazem drip infusing    Review of Systems:   Unable to obtain reliably    Objective     Vital Signs  Temp:  [97.4 °F (36.3 °C)-97.9 °F (36.6 °C)] 97.9 °F (36.6 °C)  Heart Rate:  [60-70] 70  Resp:  [16-18] 16  BP: (146-219)/(63-89) 185/80    Flowsheet Rows      First Filed Value   Admission Height  165.1 cm (65\") Documented at 03/27/2020 1029   Admission Weight  60.3 kg (133 lb) Documented at 03/27/2020 1029          I/O this shift:  In: 100 [P.O.:100]  Out: -   I/O last 3 completed shifts:  In: 180 [P.O.:180]  Out: 1350 [Urine:1350]    Intake/Output Summary (Last 24 hours) at 4/11/2020 1357  Last data filed at 4/11/2020 1127  Gross per 24 hour   Intake 280 ml   Output 800 ml   Net -520 ml       Physical Exam:  Elderly wf.  Oral mucosa with signif dried blood inside and outside; lips are raw.    Awake; minimally conversant  Neck no jvd  Heart irregularly irregular, tachycardic, no s3 or rub  Lungs clear to auscultation, no wheezing. Unlabored on supplemental O2  Abd + bs, soft, no guarding or rebound  Ext 1+ upper and 1+ lower ext edema.     SKin scattered ecchymoses      Results Review:    Results from last 7 days   Lab Units 04/10/20  0613 04/09/20  1218 04/09/20  0552 04/08/20  0533   SODIUM mmol/L 147*  --  146* 145   POTASSIUM mmol/L 3.1* 3.5 3.2* 3.2*   CHLORIDE mmol/L 107  --  107 106   CO2 mmol/L 25.6  --  23.7 23.8   BUN mg/dL 36*  --  35* 35*   CREATININE mg/dL 1.05*  --  1.21* 1.38*   CALCIUM mg/dL 9.0  --  8.5* 8.6   BILIRUBIN mg/dL 1.5*  --  1.5* 1.7*   ALK PHOS U/L 282*  --  297* 330*   ALT (SGPT) U/L 26  --  24 29   AST (SGOT) U/L 23  --  27 " 27   GLUCOSE mg/dL 203*  --  188* 171*       Estimated Creatinine Clearance: 41.4 mL/min (A) (by C-G formula based on SCr of 1.05 mg/dL (H)).    Results from last 7 days   Lab Units 04/10/20  0613 04/09/20  0552 04/08/20  0533 04/07/20  0625 04/06/20  0525   MAGNESIUM mg/dL 1.8 1.5* 1.6 1.7 1.8   PHOSPHORUS mg/dL 2.3*  --   --  2.9 3.2             Results from last 7 days   Lab Units 04/10/20  0613 04/09/20  0552 04/08/20  0533 04/07/20  0625 04/06/20  1930 04/06/20  0525   WBC 10*3/mm3 7.16 6.73 6.39 7.10  --  8.40   HEMOGLOBIN g/dL 9.7* 9.4* 9.8* 9.7* 10.6* 10.1*   PLATELETS 10*3/mm3 258 233 188 172  --  175       Results from last 7 days   Lab Units 04/05/20  0509   INR  1.35*         Imaging Results (Last 24 Hours)     ** No results found for the last 24 hours. **          insulin lispro 0-7 Units Subcutaneous 4x Daily With Meals & Nightly   nebivolol 20 mg Oral Q24H   pantoprazole 40 mg Intravenous BID AC       dilTIAZem 5-15 mg/hr Last Rate: 10 mg/hr (04/11/20 1300)       Medication Review:   Current Facility-Administered Medications   Medication Dose Route Frequency Provider Last Rate Last Dose   • acetaminophen (TYLENOL) tablet 650 mg  650 mg Oral Q4H PRN Boogie Saavedra MD        Or   • acetaminophen (TYLENOL) suppository 650 mg  650 mg Rectal Q4H PRN Boogie Saavedra MD       • dextrose (D50W) 25 g/ 50mL Intravenous Solution 25 g  25 g Intravenous Q15 Min PRN Boogie Saavedra MD   25 g at 03/30/20 0033   • dextrose (GLUTOSE) oral gel 15 g  15 g Oral Q15 Min PRN Boogie Saavedra MD       • dilTIAZem (CARDIZEM) 100 mg in 100 mL NS (1 mg/mL) infusion  5-15 mg/hr Intravenous Titrated Lucy Marinelli APRN 10 mL/hr at 04/11/20 1300 10 mg/hr at 04/11/20 1300   • glucagon (human recombinant) (GLUCAGEN DIAGNOSTIC) injection 1 mg  1 mg Subcutaneous Q15 Min PRN Boogie Saavedra MD       • hydrALAZINE (APRESOLINE) injection 20 mg  20 mg Intravenous Q6H PRN Chandana Kapoor MD   20 mg at 04/11/20 0340   • insulin  lispro (humaLOG) injection 0-7 Units  0-7 Units Subcutaneous 4x Daily With Meals & Nightly Chandana Kapoor MD   2 Units at 04/11/20 1220   • ipratropium-albuterol (DUO-NEB) nebulizer solution 3 mL  3 mL Nebulization Q2H PRN Boogie Saavedra MD       • magic mouthwash oral supsension 15 mL  15 mL Swish & Spit Q4H PRN Mario Saavedra MD   15 mL at 04/01/20 2022   • metoprolol tartrate (LOPRESSOR) injection 5 mg  5 mg Intravenous Q6H PRN Roni Gutierrez,        • nebivolol (BYSTOLIC) tablet 20 mg  20 mg Oral Q24H Emily Davis MD   20 mg at 04/11/20 1124   • ondansetron (ZOFRAN) tablet 4 mg  4 mg Oral Q6H PRN Boogie Saavedra MD        Or   • ondansetron (ZOFRAN) injection 4 mg  4 mg Intravenous Q6H PRN Boogie Saavedra MD       • pantoprazole (PROTONIX) injection 40 mg  40 mg Intravenous BID AC Chandana Kapoor MD   40 mg at 04/11/20 0707       Assessment/Plan   1. KIERAN, non-oliguric, resolving as of yesterday.  HD last on 4.6.20.  HD catheter removed yesterday.  Low potassium and low phosphorus yesterday; no labs today  2. GI bleed, hemorrhagic shock.  Duodenal ulcer, erosive gastritis on EGD. Hg stable at last check.  3. Anemia of blood loss, GIB, oral mucosal bleeding.  4. Left subacute left MCA CVA. Aphasia .  5. Dementia  6. HTN.  On cardizem drip for PAF  7. DM2  8. Nutrition.  Not taking any po. Albumin 2.6.    9. PAF    Plan:  1.  Await decision from family regarding goals of care; palliative care being considered by family  2.  Tentatively will arrange chemistries for tomorrow, though will cancel if comfort-based care chosen by family    Chandana Hook MD  04/11/20  13:57

## 2020-04-12 LAB
ALBUMIN SERPL-MCNC: 2.7 G/DL (ref 3.5–5.2)
ANION GAP SERPL CALCULATED.3IONS-SCNC: 13 MMOL/L (ref 5–15)
BASOPHILS # BLD AUTO: 0.03 10*3/MM3 (ref 0–0.2)
BASOPHILS NFR BLD AUTO: 0.5 % (ref 0–1.5)
BUN BLD-MCNC: 32 MG/DL (ref 8–23)
BUN/CREAT SERPL: 31.4 (ref 7–25)
CALCIUM SPEC-SCNC: 9 MG/DL (ref 8.6–10.5)
CHLORIDE SERPL-SCNC: 118 MMOL/L (ref 98–107)
CO2 SERPL-SCNC: 27 MMOL/L (ref 22–29)
CREAT BLD-MCNC: 1.02 MG/DL (ref 0.57–1)
DEPRECATED RDW RBC AUTO: 48.9 FL (ref 37–54)
EOSINOPHIL # BLD AUTO: 0.13 10*3/MM3 (ref 0–0.4)
EOSINOPHIL NFR BLD AUTO: 2.3 % (ref 0.3–6.2)
ERYTHROCYTE [DISTWIDTH] IN BLOOD BY AUTOMATED COUNT: 15.1 % (ref 12.3–15.4)
GFR SERPL CREATININE-BSD FRML MDRD: 53 ML/MIN/1.73
GLUCOSE BLD-MCNC: 217 MG/DL (ref 65–99)
GLUCOSE BLDC GLUCOMTR-MCNC: 193 MG/DL (ref 70–130)
GLUCOSE BLDC GLUCOMTR-MCNC: 197 MG/DL (ref 70–130)
GLUCOSE BLDC GLUCOMTR-MCNC: 223 MG/DL (ref 70–130)
GLUCOSE BLDC GLUCOMTR-MCNC: 254 MG/DL (ref 70–130)
HCT VFR BLD AUTO: 33.2 % (ref 34–46.6)
HGB BLD-MCNC: 10.8 G/DL (ref 12–15.9)
IMM GRANULOCYTES # BLD AUTO: 0.03 10*3/MM3 (ref 0–0.05)
IMM GRANULOCYTES NFR BLD AUTO: 0.5 % (ref 0–0.5)
LYMPHOCYTES # BLD AUTO: 0.51 10*3/MM3 (ref 0.7–3.1)
LYMPHOCYTES NFR BLD AUTO: 8.9 % (ref 19.6–45.3)
MAGNESIUM SERPL-MCNC: 1.7 MG/DL (ref 1.6–2.4)
MCH RBC QN AUTO: 29.7 PG (ref 26.6–33)
MCHC RBC AUTO-ENTMCNC: 32.5 G/DL (ref 31.5–35.7)
MCV RBC AUTO: 91.2 FL (ref 79–97)
MONOCYTES # BLD AUTO: 0.35 10*3/MM3 (ref 0.1–0.9)
MONOCYTES NFR BLD AUTO: 6.1 % (ref 5–12)
NEUTROPHILS # BLD AUTO: 4.66 10*3/MM3 (ref 1.7–7)
NEUTROPHILS NFR BLD AUTO: 81.7 % (ref 42.7–76)
NRBC BLD AUTO-RTO: 0 /100 WBC (ref 0–0.2)
PHOSPHATE SERPL-MCNC: 2.5 MG/DL (ref 2.5–4.5)
PLATELET # BLD AUTO: 276 10*3/MM3 (ref 140–450)
PMV BLD AUTO: 10.5 FL (ref 6–12)
POTASSIUM BLD-SCNC: 3.2 MMOL/L (ref 3.5–5.2)
RBC # BLD AUTO: 3.64 10*6/MM3 (ref 3.77–5.28)
SODIUM BLD-SCNC: 158 MMOL/L (ref 136–145)
WBC NRBC COR # BLD: 5.71 10*3/MM3 (ref 3.4–10.8)

## 2020-04-12 PROCEDURE — 85025 COMPLETE CBC W/AUTO DIFF WBC: CPT | Performed by: INTERNAL MEDICINE

## 2020-04-12 PROCEDURE — 82962 GLUCOSE BLOOD TEST: CPT

## 2020-04-12 PROCEDURE — 25010000002 MAGNESIUM SULFATE IN D5W 1G/100ML (PREMIX) 1-5 GM/100ML-% SOLUTION: Performed by: INTERNAL MEDICINE

## 2020-04-12 PROCEDURE — 25010000002 HYDRALAZINE PER 20 MG: Performed by: INTERNAL MEDICINE

## 2020-04-12 PROCEDURE — 83735 ASSAY OF MAGNESIUM: CPT | Performed by: NURSE PRACTITIONER

## 2020-04-12 PROCEDURE — 63710000001 INSULIN LISPRO (HUMAN) PER 5 UNITS: Performed by: INTERNAL MEDICINE

## 2020-04-12 PROCEDURE — 80069 RENAL FUNCTION PANEL: CPT | Performed by: INTERNAL MEDICINE

## 2020-04-12 RX ORDER — MAGNESIUM SULFATE 1 G/100ML
2 INJECTION INTRAVENOUS ONCE
Status: DISCONTINUED | OUTPATIENT
Start: 2020-04-12 | End: 2020-04-16

## 2020-04-12 RX ORDER — DILTIAZEM HYDROCHLORIDE 60 MG/1
60 TABLET, FILM COATED ORAL EVERY 6 HOURS SCHEDULED
Status: DISCONTINUED | OUTPATIENT
Start: 2020-04-12 | End: 2020-04-14

## 2020-04-12 RX ORDER — DEXTROSE MONOHYDRATE 50 MG/ML
75 INJECTION, SOLUTION INTRAVENOUS CONTINUOUS
Status: DISCONTINUED | OUTPATIENT
Start: 2020-04-12 | End: 2020-04-13

## 2020-04-12 RX ORDER — MAGNESIUM SULFATE 1 G/100ML
1 INJECTION INTRAVENOUS
Status: COMPLETED | OUTPATIENT
Start: 2020-04-12 | End: 2020-04-12

## 2020-04-12 RX ORDER — POTASSIUM CHLORIDE 750 MG/1
40 CAPSULE, EXTENDED RELEASE ORAL EVERY 6 HOURS
Status: COMPLETED | OUTPATIENT
Start: 2020-04-12 | End: 2020-04-12

## 2020-04-12 RX ADMIN — POTASSIUM PHOSPHATE, MONOBASIC AND POTASSIUM PHOSPHATE, DIBASIC 10 MMOL: 224; 236 INJECTION, SOLUTION INTRAVENOUS at 21:45

## 2020-04-12 RX ADMIN — PANTOPRAZOLE SODIUM 40 MG: 40 INJECTION, POWDER, FOR SOLUTION INTRAVENOUS at 07:30

## 2020-04-12 RX ADMIN — INSULIN LISPRO 2 UNITS: 100 INJECTION, SOLUTION INTRAVENOUS; SUBCUTANEOUS at 17:47

## 2020-04-12 RX ADMIN — DILTIAZEM HYDROCHLORIDE 60 MG: 60 TABLET, FILM COATED ORAL at 21:44

## 2020-04-12 RX ADMIN — INSULIN LISPRO 2 UNITS: 100 INJECTION, SOLUTION INTRAVENOUS; SUBCUTANEOUS at 08:48

## 2020-04-12 RX ADMIN — POTASSIUM CHLORIDE 40 MEQ: 10 CAPSULE, COATED, EXTENDED RELEASE ORAL at 21:45

## 2020-04-12 RX ADMIN — SODIUM CHLORIDE 15 MG/HR: 900 INJECTION, SOLUTION INTRAVENOUS at 15:31

## 2020-04-12 RX ADMIN — SODIUM CHLORIDE 15 MG/HR: 900 INJECTION, SOLUTION INTRAVENOUS at 07:45

## 2020-04-12 RX ADMIN — PANTOPRAZOLE SODIUM 40 MG: 40 INJECTION, POWDER, FOR SOLUTION INTRAVENOUS at 17:48

## 2020-04-12 RX ADMIN — INSULIN LISPRO 3 UNITS: 100 INJECTION, SOLUTION INTRAVENOUS; SUBCUTANEOUS at 21:45

## 2020-04-12 RX ADMIN — POTASSIUM CHLORIDE 40 MEQ: 10 CAPSULE, COATED, EXTENDED RELEASE ORAL at 16:20

## 2020-04-12 RX ADMIN — MAGNESIUM SULFATE 1 G: 1 INJECTION INTRAVENOUS at 17:48

## 2020-04-12 RX ADMIN — DEXTROSE MONOHYDRATE 75 ML/HR: 50 INJECTION, SOLUTION INTRAVENOUS at 16:06

## 2020-04-12 RX ADMIN — INSULIN LISPRO 4 UNITS: 100 INJECTION, SOLUTION INTRAVENOUS; SUBCUTANEOUS at 12:34

## 2020-04-12 RX ADMIN — MAGNESIUM SULFATE 1 G: 1 INJECTION INTRAVENOUS at 16:13

## 2020-04-12 RX ADMIN — HYDRALAZINE HYDROCHLORIDE 20 MG: 20 INJECTION INTRAMUSCULAR; INTRAVENOUS at 05:21

## 2020-04-12 RX ADMIN — NEBIVOLOL HYDROCHLORIDE 20 MG: 10 TABLET ORAL at 08:49

## 2020-04-12 RX ADMIN — DILTIAZEM HYDROCHLORIDE 60 MG: 60 TABLET, FILM COATED ORAL at 16:19

## 2020-04-12 NOTE — PLAN OF CARE
Patient is resting well this shift and continues to be repositioned in bed with oral care, hernandez care, and fed thick water/pureed food as tolerated. Lungs are diminished with equal respirations and no coughing noted. Abdomen is soft with +bowel sounds in all quadrants with hernandez catheter to BSD. Bilateral SCDs on, and no lower edema noted, however she continues with 3+ edema to bilateral arms. Pulses are present, skin is pale, and mouth continues to be sore with blisters and bleeding. She is tolerating her Cardizem drip but still continues to require PRN Hydralazine for HTN.

## 2020-04-12 NOTE — PROGRESS NOTES
Name: Darby Workman ADMIT: 3/27/2020   : 1945  PCP: Eric Matta MD    MRN: [id number] LOS: 16 days   AGE/SEX: 75 y.o. female  ROOM: [facility]     Subjective   Subjective   Patient opens eyes to verbal stimuli.  RN reports she is taking some diet in and taking some pills crushed.      Review of Systems   Unable to perform ROS: Dementia        Objective   Objective   Vital Signs  Temp:  [97.5 °F (36.4 °C)-98.1 °F (36.7 °C)] 98.1 °F (36.7 °C)  Heart Rate:  [66-83] 71  Resp:  [16-18] 16  BP: (163-230)/() 186/90  SpO2:  [98 %-99 %] 99 %  on  Flow (L/min):  [2] 2;   Device (Oxygen Therapy): nasal cannula  Body mass index is 20.4 kg/m².       Physical Exam   Constitutional: She appears well-developed. She appears ill. No distress.   elderly, frail   HENT:   Oral lesions on lips with purulent drainage noted.    Eyes: Right eye exhibits no discharge. Left eye exhibits no discharge. No scleral icterus.   Neck: No JVD present.   Cardiovascular: Normal rate and regular rhythm.   Pulmonary/Chest: Effort normal and breath sounds normal.   Abdominal: Soft. She exhibits no distension. There is no guarding.   hypoactive   Neurological:   Aphasia, will awaken with tactile stimuli and tracks with eyes briefly but then falls back to sleep.    Skin: Skin is warm and dry. There is pallor.   shiley to left neck.    Psychiatric: Cognition and memory are impaired. She expresses inappropriate judgment. She is noncommunicative.   Nursing note and vitals reviewed.    Tele SR     Results Review:       I reviewed the patient's new clinical results.  Results from last 7 days   Lab Units 20  0706 04/10/20  0613 20  0552 20  0533   WBC 10*3/mm3 5.71 7.16 6.73 6.39   HEMOGLOBIN g/dL 10.8* 9.7* 9.4* 9.8*   PLATELETS 10*3/mm3 276 258 233 188     Results from last 7 days   Lab Units 20  0706 04/10/20  0613 20  1218 20  0552 20  0533   SODIUM mmol/L 158* 147*  --  146* 145   POTASSIUM mmol/L  3.2* 3.1* 3.5 3.2* 3.2*   CHLORIDE mmol/L 118* 107  --  107 106   CO2 mmol/L 27.0 25.6  --  23.7 23.8   BUN mg/dL 32* 36*  --  35* 35*   CREATININE mg/dL 1.02* 1.05*  --  1.21* 1.38*   GLUCOSE mg/dL 217* 203*  --  188* 171*   Estimated Creatinine Clearance: 41.2 mL/min (A) (by C-G formula based on SCr of 1.02 mg/dL (H)).  Results from last 7 days   Lab Units 04/12/20  0706 04/10/20  0613 04/09/20  0552 04/08/20  0533 04/07/20  0625   ALBUMIN g/dL 2.70* 3.00* 2.60* 2.50* 2.70*   BILIRUBIN mg/dL  --  1.5* 1.5* 1.7* 1.8*   ALK PHOS U/L  --  282* 297* 330* 341*   AST (SGOT) U/L  --  23 27 27 31   ALT (SGPT) U/L  --  26 24 29 31     Results from last 7 days   Lab Units 04/12/20  0706 04/10/20  0613 04/09/20  0552 04/08/20  0533 04/07/20  0625 04/06/20  0525   CALCIUM mg/dL 9.0 9.0 8.5* 8.6 8.2* 8.5*   ALBUMIN g/dL 2.70* 3.00* 2.60* 2.50* 2.70* 2.50*   MAGNESIUM mg/dL 1.7 1.8 1.5* 1.6 1.7 1.8   PHOSPHORUS mg/dL 2.5 2.3*  --   --  2.9 3.2       Glucose   Date/Time Value Ref Range Status   04/12/2020 0552 193 (H) 70 - 130 mg/dL Final   04/11/2020 2027 178 (H) 70 - 130 mg/dL Final   04/11/2020 1646 248 (H) 70 - 130 mg/dL Final   04/11/2020 1119 165 (H) 70 - 130 mg/dL Final   04/11/2020 0604 225 (H) 70 - 130 mg/dL Final   04/10/2020 2106 230 (H) 70 - 130 mg/dL Final   04/10/2020 1810 165 (H) 70 - 130 mg/dL Final         insulin lispro 0-7 Units Subcutaneous 4x Daily With Meals & Nightly   nebivolol 20 mg Oral Q24H   pantoprazole 40 mg Intravenous BID AC       dilTIAZem 5-15 mg/hr Last Rate: 15 mg/hr (04/12/20 0745)   Diet Pureed; Nectar / Syrup Thick; Cardiac    Estimated Creatinine Clearance: 41.2 mL/min (A) (by C-G formula based on SCr of 1.02 mg/dL (H)).       Assessment/Plan     Active Hospital Problems    Diagnosis  POA   • **Upper GI bleed [K92.2]  Yes   • Uremic encephalopathy [G93.41, N19]  Yes   • Acute kidney injury (CMS/HCC) [N17.9]  Yes   • Acute pancreatitis [K85.90]  Yes   • Hemorrhagic shock (CMS/HCC) [R57.8]   Yes   • Thrombocytopenia (CMS/HCC) [D69.6]  Yes   • Type 2 diabetes mellitus with hyperglycemia (CMS/HCC) [E11.65]  Yes   • Dementia without behavioral disturbance (CMS/HCC) [F03.90]  Yes   • Acute posthemorrhagic anemia [D62]  Yes   • Hypertension [I10]  Yes   • Hyperlipidemia [E78.5]  Yes      Resolved Hospital Problems   No resolved problems to display.     GIB/Hemorrhagic Shock Required transfusion and Kcentra. Duodenal Ulcer and erosive gastritis noted on EGD. Anticoagulation held. Also has oral component of blood loss as well due to erosions of oral mucosa. GI, Surgery, and Oncology evaluated. All have recommended palliative care given her poor prognosis and multiorgan failure. Has continued oral oozing and had additional rectal bleed several days ago. Hgb with slight trend up - 4/12 10.8 (9.7). Is on BID IV protonix.     CVA: subacute left MCA CVA. Subacute based on CT and aphasia. Neurology evaluated. She is not AC or antiplatelet candidate at this time. She had this even while she was on Eliquis and aspirin prior to admission. Aphasic speech. SLP has started modified diet based on their eval 4/6 but refusing dietary intake.     Dementia with metabolic encephalopathy- unchanged     Leukocytosis: resolved. No antibiotics indicated. Infectious Disease has evaluated.    AFib: on Cardizem gtt and bisoprolol and is in SR. Cardiology last saw 4/9. Will ask  for their input regarding switching over to po crushed meds for management.  HR 60-70's.     HTN: is off cardene gtt- BP still running high but responded to IV hydralazine.  remains on cardizem gtt titrated up to 15 mg due to elevated BP.  Will reach out to cardiology team for recommendations regarding converting over to po crushed meds.      TCP: resolved    KIERAN: HS last on 4/6.  Has shiley in place.  Nephrology following.  Creatinine 1.02 (1.05 <--1.21).  Na trending up 158 (147). K+ 3.2.  Defer to nephology.  Continue restraint with shiley in place for  safety.     Hypokalemia:  Defer to nephrology.     DM:  FSBS 165-225.   Is on SS insulin with 2-3 units of coverage needed.          Disposition: Poor prognosis. Continue f/c for comfort while awaiting family decision regarding goals of care.        BMP in am.       GETACHEW Lewis  [facility]  04/12/20  10:49

## 2020-04-12 NOTE — CONSULTS
Nutrition Services    Patient Name:  Darby Workman  YOB: 1945  MRN: [id number]  Admit Date:  3/27/2020    Calorie Count to start per MD order. Pt on Pureed NTL Cardiac diet with Boost pudding TID. Pt ate some for breakfast and lunch today but po intake poor. Full result of Calorie Count to be available on 4/13/2020.    Electronically signed by:  Rossi Singh RD  04/12/20 13:38

## 2020-04-12 NOTE — PLAN OF CARE
Nonverbal, no pain, oral care, vs stable, cardizem gtt, mg 2mg x1, K phos x1, D5 at 75cc, NAD, restraints cont, pt eating more today      Problem: Fall Risk (Adult)  Goal: Absence of Fall  Outcome: Ongoing (interventions implemented as appropriate)     Problem: Restraint, Nonbehavioral (Nonviolent)  Goal: Rationale and Justification  Outcome: Ongoing (interventions implemented as appropriate)  Goal: Nonbehavioral (Nonviolent) Restraint: Absence of Injury/Harm  Outcome: Ongoing (interventions implemented as appropriate)  Goal: Nonbehavioral (Nonviolent) Restraint: Achievement of Discontinuation Criteria  Outcome: Ongoing (interventions implemented as appropriate)  Goal: Nonbehavioral (Nonviolent) Restraint: Preservation of Dignity and Wellbeing  Outcome: Ongoing (interventions implemented as appropriate)     Problem: Renal Failure/Kidney Injury, Acute (Adult)  Goal: Signs and Symptoms of Listed Potential Problems Will be Absent, Minimized or Managed (Renal Failure/Kidney Injury, Acute)  Outcome: Ongoing (interventions implemented as appropriate)     Problem: Pain, Acute (Adult)  Goal: Acceptable Pain Control/Comfort Level  Outcome: Ongoing (interventions implemented as appropriate)     Problem: Gastrointestinal Bleeding (Adult)  Goal: Signs and Symptoms of Listed Potential Problems Will be Absent, Minimized or Managed (Gastrointestinal Bleeding)  Outcome: Ongoing (interventions implemented as appropriate)     Problem: Skin Injury Risk (Adult)  Goal: Skin Health and Integrity  Outcome: Ongoing (interventions implemented as appropriate)     Problem: Patient Care Overview  Goal: Plan of Care Review  Outcome: Ongoing (interventions implemented as appropriate)  Goal: Individualization and Mutuality  Outcome: Ongoing (interventions implemented as appropriate)  Goal: Discharge Needs Assessment  Outcome: Ongoing (interventions implemented as appropriate)  Goal: Interprofessional Rounds/Family Conf  Outcome: Ongoing  (interventions implemented as appropriate)

## 2020-04-12 NOTE — PROGRESS NOTES
"   LOS: 16 days    Patient Care Team:  Eric Matta MD as PCP - General (General Practice)    Chief Complaint:    Chief Complaint   Patient presents with   • Altered Mental Status   • Black or Bloody Stool     KIERAN  Subjective     Interval History:   Awake but speech mostly unintelligible; eats and drinks when encouraged; diltiazem drip infusing    Review of Systems:   Unable to obtain reliably    Objective     Vital Signs  Temp:  [97.5 °F (36.4 °C)-98.1 °F (36.7 °C)] 97.6 °F (36.4 °C)  Heart Rate:  [64-83] 64  Resp:  [16-18] 16  BP: (163-230)/() 169/75    Flowsheet Rows      First Filed Value   Admission Height  165.1 cm (65\") Documented at 03/27/2020 1029   Admission Weight  60.3 kg (133 lb) Documented at 03/27/2020 1029          I/O this shift:  In: 120 [P.O.:120]  Out: -   I/O last 3 completed shifts:  In: 100 [P.O.:100]  Out: 1150 [Urine:1150]    Intake/Output Summary (Last 24 hours) at 4/12/2020 1415  Last data filed at 4/12/2020 1108  Gross per 24 hour   Intake 120 ml   Output 650 ml   Net -530 ml       Physical Exam:  Elderly WF.  Oral mucosa less raw than yesterday   Awake; minimally conversant  Neck no jvd  Heart irregularly irregular, not tachycardic, no s3 or rub  Lungs clear to auscultation, no wheezing. Unlabored on supplemental O2  Abd + bs, soft, no guarding or rebound  Ext 1+ upper and 1+ lower ext edema.     SKin scattered ecchymoses      Results Review:    Results from last 7 days   Lab Units 04/12/20  0706 04/10/20  0613 04/09/20  1218 04/09/20  0552 04/08/20  0533   SODIUM mmol/L 158* 147*  --  146* 145   POTASSIUM mmol/L 3.2* 3.1* 3.5 3.2* 3.2*   CHLORIDE mmol/L 118* 107  --  107 106   CO2 mmol/L 27.0 25.6  --  23.7 23.8   BUN mg/dL 32* 36*  --  35* 35*   CREATININE mg/dL 1.02* 1.05*  --  1.21* 1.38*   CALCIUM mg/dL 9.0 9.0  --  8.5* 8.6   BILIRUBIN mg/dL  --  1.5*  --  1.5* 1.7*   ALK PHOS U/L  --  282*  --  297* 330*   ALT (SGPT) U/L  --  26  --  24 29   AST (SGOT) U/L  --  23  --  27 27 "   GLUCOSE mg/dL 217* 203*  --  188* 171*       Estimated Creatinine Clearance: 41.2 mL/min (A) (by C-G formula based on SCr of 1.02 mg/dL (H)).    Results from last 7 days   Lab Units 04/12/20  0706 04/10/20  0613 04/09/20  0552  04/07/20  0625   MAGNESIUM mg/dL 1.7 1.8 1.5*   < > 1.7   PHOSPHORUS mg/dL 2.5 2.3*  --   --  2.9    < > = values in this interval not displayed.             Results from last 7 days   Lab Units 04/12/20  0706 04/10/20  0613 04/09/20  0552 04/08/20  0533 04/07/20  0625   WBC 10*3/mm3 5.71 7.16 6.73 6.39 7.10   HEMOGLOBIN g/dL 10.8* 9.7* 9.4* 9.8* 9.7*   PLATELETS 10*3/mm3 276 258 233 188 172               Imaging Results (Last 24 Hours)     ** No results found for the last 24 hours. **          dilTIAZem 60 mg Oral Q6H   insulin lispro 0-7 Units Subcutaneous 4x Daily With Meals & Nightly   nebivolol 20 mg Oral Q24H   pantoprazole 40 mg Intravenous BID AC       dilTIAZem 5-15 mg/hr Last Rate: 15 mg/hr (04/12/20 0745)       Medication Review:   Current Facility-Administered Medications   Medication Dose Route Frequency Provider Last Rate Last Dose   • acetaminophen (TYLENOL) tablet 650 mg  650 mg Oral Q4H PRN Boogie Saavedra MD        Or   • acetaminophen (TYLENOL) suppository 650 mg  650 mg Rectal Q4H PRN Boogie Saavedra MD       • dextrose (D50W) 25 g/ 50mL Intravenous Solution 25 g  25 g Intravenous Q15 Min PRN Boogie Saavedra MD   25 g at 03/30/20 0033   • dextrose (GLUTOSE) oral gel 15 g  15 g Oral Q15 Min PRN Boogie Saavedra MD       • dilTIAZem (CARDIZEM) 100 mg in 100 mL NS (1 mg/mL) infusion  5-15 mg/hr Intravenous Titrated Lucy Marinelli APRN 15 mL/hr at 04/12/20 0745 15 mg/hr at 04/12/20 0745   • dilTIAZem (CARDIZEM) tablet 60 mg  60 mg Oral Q6H Veronika Perez MD       • glucagon (human recombinant) (GLUCAGEN DIAGNOSTIC) injection 1 mg  1 mg Subcutaneous Q15 Min PRN Boogie Savaedra MD       • hydrALAZINE (APRESOLINE) injection 20 mg  20 mg Intravenous Q6H PRN Emelia  Chandana GOSS MD   20 mg at 04/12/20 0521   • insulin lispro (humaLOG) injection 0-7 Units  0-7 Units Subcutaneous 4x Daily With Meals & Nightly Chandana Kapoor MD   4 Units at 04/12/20 1234   • ipratropium-albuterol (DUO-NEB) nebulizer solution 3 mL  3 mL Nebulization Q2H PRN Boogie Saavedra MD       • magic mouthwash oral supsension 15 mL  15 mL Swish & Spit Q4H PRN Mario Saavedra MD   15 mL at 04/01/20 2022   • metoprolol tartrate (LOPRESSOR) injection 5 mg  5 mg Intravenous Q6H PRN Roni Gutierrez, DO       • nebivolol (BYSTOLIC) tablet 20 mg  20 mg Oral Q24H Emily Davis MD   20 mg at 04/12/20 0849   • ondansetron (ZOFRAN) tablet 4 mg  4 mg Oral Q6H PRN Boogie Saavedra MD        Or   • ondansetron (ZOFRAN) injection 4 mg  4 mg Intravenous Q6H PRN Boogie Saavedra MD       • pantoprazole (PROTONIX) injection 40 mg  40 mg Intravenous BID AC Chandana Kapoor MD   40 mg at 04/12/20 0730       Assessment/Plan   1. KIERAN, non-oliguric, improving.  HD last on 4.6.20, and HD catheter removed 4.10.20.  Significant hypernatremia due to insufficient water intake.  Low potassium and low phosphorus.  2. GI bleed, hemorrhagic shock.  Duodenal ulcer, erosive gastritis on EGD. Hg stable  3. Anemia of blood loss, GIB, oral mucosal bleeding.  4. Left subacute left MCA CVA. Aphasia .  5. Dementia  6. HTN.  On cardizem drip for PAF  7. DM2  8. Nutrition.  Not taking any po. Albumin 2.6.    9. PAF    Plan:  1.  D5W for 2 liters  2.  Oral KCl  3.  One dose of potassium phosphate IV  4.  IV magnesium sulfate  5.  Family deciding on level of care    Chandana Hook MD  04/12/20  14:15

## 2020-04-12 NOTE — PROGRESS NOTES
Currently on IV diltiazem 15 mg an hour.  Transition to p.o. diltiazem 60 mg every 6 hours.  Okay to crush.  No further cardiac recommendations at this time.

## 2020-04-13 LAB
ALBUMIN SERPL-MCNC: 2.6 G/DL (ref 3.5–5.2)
ANION GAP SERPL CALCULATED.3IONS-SCNC: 7.8 MMOL/L (ref 5–15)
BASOPHILS # BLD AUTO: 0.02 10*3/MM3 (ref 0–0.2)
BASOPHILS NFR BLD AUTO: 0.3 % (ref 0–1.5)
BUN BLD-MCNC: 26 MG/DL (ref 8–23)
BUN/CREAT SERPL: 26.5 (ref 7–25)
CALCIUM SPEC-SCNC: 8.7 MG/DL (ref 8.6–10.5)
CHLORIDE SERPL-SCNC: 117 MMOL/L (ref 98–107)
CO2 SERPL-SCNC: 27.2 MMOL/L (ref 22–29)
CREAT BLD-MCNC: 0.98 MG/DL (ref 0.57–1)
DEPRECATED RDW RBC AUTO: 48.5 FL (ref 37–54)
EOSINOPHIL # BLD AUTO: 0.27 10*3/MM3 (ref 0–0.4)
EOSINOPHIL NFR BLD AUTO: 4.3 % (ref 0.3–6.2)
ERYTHROCYTE [DISTWIDTH] IN BLOOD BY AUTOMATED COUNT: 15.4 % (ref 12.3–15.4)
GFR SERPL CREATININE-BSD FRML MDRD: 55 ML/MIN/1.73
GLUCOSE BLD-MCNC: 293 MG/DL (ref 65–99)
GLUCOSE BLDC GLUCOMTR-MCNC: 143 MG/DL (ref 70–130)
GLUCOSE BLDC GLUCOMTR-MCNC: 248 MG/DL (ref 70–130)
GLUCOSE BLDC GLUCOMTR-MCNC: 270 MG/DL (ref 70–130)
GLUCOSE BLDC GLUCOMTR-MCNC: 284 MG/DL (ref 70–130)
HCT VFR BLD AUTO: 32.4 % (ref 34–46.6)
HGB BLD-MCNC: 10.7 G/DL (ref 12–15.9)
IMM GRANULOCYTES # BLD AUTO: 0.03 10*3/MM3 (ref 0–0.05)
IMM GRANULOCYTES NFR BLD AUTO: 0.5 % (ref 0–0.5)
LYMPHOCYTES # BLD AUTO: 0.54 10*3/MM3 (ref 0.7–3.1)
LYMPHOCYTES NFR BLD AUTO: 8.5 % (ref 19.6–45.3)
MAGNESIUM SERPL-MCNC: 2 MG/DL (ref 1.6–2.4)
MCH RBC QN AUTO: 29.7 PG (ref 26.6–33)
MCHC RBC AUTO-ENTMCNC: 33 G/DL (ref 31.5–35.7)
MCV RBC AUTO: 90 FL (ref 79–97)
MONOCYTES # BLD AUTO: 0.38 10*3/MM3 (ref 0.1–0.9)
MONOCYTES NFR BLD AUTO: 6 % (ref 5–12)
NEUTROPHILS # BLD AUTO: 5.08 10*3/MM3 (ref 1.7–7)
NEUTROPHILS NFR BLD AUTO: 80.4 % (ref 42.7–76)
NRBC BLD AUTO-RTO: 0 /100 WBC (ref 0–0.2)
PHOSPHATE SERPL-MCNC: 2.2 MG/DL (ref 2.5–4.5)
PLATELET # BLD AUTO: 254 10*3/MM3 (ref 140–450)
PMV BLD AUTO: 10.7 FL (ref 6–12)
POTASSIUM BLD-SCNC: 3.8 MMOL/L (ref 3.5–5.2)
RBC # BLD AUTO: 3.6 10*6/MM3 (ref 3.77–5.28)
SODIUM BLD-SCNC: 152 MMOL/L (ref 136–145)
WBC NRBC COR # BLD: 6.32 10*3/MM3 (ref 3.4–10.8)

## 2020-04-13 PROCEDURE — 83735 ASSAY OF MAGNESIUM: CPT | Performed by: NURSE PRACTITIONER

## 2020-04-13 PROCEDURE — 25010000002 HYDRALAZINE PER 20 MG: Performed by: INTERNAL MEDICINE

## 2020-04-13 PROCEDURE — 63710000001 INSULIN LISPRO (HUMAN) PER 5 UNITS: Performed by: INTERNAL MEDICINE

## 2020-04-13 PROCEDURE — 82962 GLUCOSE BLOOD TEST: CPT

## 2020-04-13 PROCEDURE — 63710000001 INSULIN LISPRO (HUMAN) PER 5 UNITS: Performed by: HOSPITALIST

## 2020-04-13 PROCEDURE — 80069 RENAL FUNCTION PANEL: CPT | Performed by: INTERNAL MEDICINE

## 2020-04-13 PROCEDURE — 85025 COMPLETE CBC W/AUTO DIFF WBC: CPT | Performed by: INTERNAL MEDICINE

## 2020-04-13 RX ORDER — DEXTROSE MONOHYDRATE 50 MG/ML
125 INJECTION, SOLUTION INTRAVENOUS CONTINUOUS
Status: ACTIVE | OUTPATIENT
Start: 2020-04-13 | End: 2020-04-14

## 2020-04-13 RX ADMIN — NEBIVOLOL HYDROCHLORIDE 20 MG: 10 TABLET ORAL at 10:03

## 2020-04-13 RX ADMIN — INSULIN LISPRO 4 UNITS: 100 INJECTION, SOLUTION INTRAVENOUS; SUBCUTANEOUS at 23:10

## 2020-04-13 RX ADMIN — INSULIN LISPRO 4 UNITS: 100 INJECTION, SOLUTION INTRAVENOUS; SUBCUTANEOUS at 10:04

## 2020-04-13 RX ADMIN — DEXTROSE MONOHYDRATE 125 ML/HR: 50 INJECTION, SOLUTION INTRAVENOUS at 19:41

## 2020-04-13 RX ADMIN — DILTIAZEM HYDROCHLORIDE 60 MG: 60 TABLET, FILM COATED ORAL at 17:57

## 2020-04-13 RX ADMIN — DEXTROSE MONOHYDRATE 75 ML/HR: 50 INJECTION, SOLUTION INTRAVENOUS at 06:12

## 2020-04-13 RX ADMIN — PANTOPRAZOLE SODIUM 40 MG: 40 INJECTION, POWDER, FOR SOLUTION INTRAVENOUS at 17:57

## 2020-04-13 RX ADMIN — PANTOPRAZOLE SODIUM 40 MG: 40 INJECTION, POWDER, FOR SOLUTION INTRAVENOUS at 06:07

## 2020-04-13 RX ADMIN — DILTIAZEM HYDROCHLORIDE 60 MG: 60 TABLET, FILM COATED ORAL at 13:30

## 2020-04-13 RX ADMIN — INSULIN LISPRO 4 UNITS: 100 INJECTION, SOLUTION INTRAVENOUS; SUBCUTANEOUS at 13:30

## 2020-04-13 RX ADMIN — HYDRALAZINE HYDROCHLORIDE 20 MG: 20 INJECTION INTRAMUSCULAR; INTRAVENOUS at 03:43

## 2020-04-13 RX ADMIN — DILTIAZEM HYDROCHLORIDE 60 MG: 60 TABLET, FILM COATED ORAL at 22:54

## 2020-04-13 NOTE — PROGRESS NOTES
"   LOS: 17 days   Patient Care Team:  Eric Matta MD as PCP - General (General Practice)    Chief Complaint: none    Subjective     Pt denies SOA, pain. Aphasic.      Subjective:  Symptoms:  Improved.  No shortness of breath, chest pain, anorexia or diarrhea.    Diet:  Poor intake (improving).  No vomiting.    Activity level: Impaired due to weakness.    Pain:  She reports no pain.        History taken from: patient chart RN    Objective     Vital Signs  Temp:  [97.2 °F (36.2 °C)-98.7 °F (37.1 °C)] 97.2 °F (36.2 °C)  Heart Rate:  [53-60] 60  Resp:  [16] 16  BP: (147-183)/(66-81) 168/78    I/O last 3 completed shifts:  In: 120 [P.O.:120]  Out: 1475 [Urine:1475]  I/O this shift:  In: 120 [P.O.:120]  Out: 400 [Urine:400]        Objective:  General Appearance:  Comfortable, in no acute distress and ill-appearing.    Vital signs: (most recent): Blood pressure 178/75, pulse 59, temperature 97.4 °F (36.3 °C), temperature source Oral, resp. rate 16, height 165.1 cm (65\"), weight 54.4 kg (119 lb 14.9 oz), SpO2 98 %.  Vital signs are normal.  No fever.    Output: Producing urine.    HEENT: (Dried blood around mouth, mucosa looking better)    Lungs:  Normal effort and normal respiratory rate.  Breath sounds clear to auscultation.  She is not in respiratory distress.  (Anteriorly)  Heart: Normal rate.  Regular rhythm.    Abdomen: Abdomen is soft.  Bowel sounds are normal.   There is no abdominal tenderness.     Extremities: There is dependent edema (of BUEs).  (Wrist restraints in place to protect neck dressings)  Pulses: Distal pulses are intact.    Neurological: Patient is alert.  (Aphasic, difficulty following instructions, some mild right sided weakness but difficult to quantify).    Pupils:  Pupils are equal, round, and reactive to light.    Skin:  Warm and dry.              Results Review:     I reviewed the patient's new clinical results.  I reviewed the patient's other test results and agree with the interpretation  I " personally viewed and interpreted the patient's EKG/Telemetry data  Discussed with pt and RN    Results from last 7 days   Lab Units 04/13/20  0523 04/12/20  0706 04/10/20  0613 04/09/20  0552 04/08/20  0533 04/07/20  0625 04/06/20  1930   WBC 10*3/mm3 6.32 5.71 7.16 6.73 6.39 7.10  --    HEMOGLOBIN g/dL 10.7* 10.8* 9.7* 9.4* 9.8* 9.7* 10.6*   PLATELETS 10*3/mm3 254 276 258 233 188 172  --      Results from last 7 days   Lab Units 04/13/20  0523 04/12/20  0706 04/10/20  0613 04/09/20  1218 04/09/20  0552 04/08/20  0533 04/07/20  0625   SODIUM mmol/L 152* 158* 147*  --  146* 145 139   POTASSIUM mmol/L 3.8 3.2* 3.1* 3.5 3.2* 3.2* 3.3*   CHLORIDE mmol/L 117* 118* 107  --  107 106 101   CO2 mmol/L 27.2 27.0 25.6  --  23.7 23.8 24.0   BUN mg/dL 26* 32* 36*  --  35* 35* 27*   CREATININE mg/dL 0.98 1.02* 1.05*  --  1.21* 1.38* 1.35*   CALCIUM mg/dL 8.7 9.0 9.0  --  8.5* 8.6 8.2*   MAGNESIUM mg/dL 2.0 1.7 1.8  --  1.5* 1.6 1.7   PHOSPHORUS mg/dL 2.2* 2.5 2.3*  --   --   --  2.9   Estimated Creatinine Clearance: 41.9 mL/min (by C-G formula based on SCr of 0.98 mg/dL).    Medication Review: reviewed and adjusted    Assessment/Plan       Upper GI bleed    Hyperlipidemia    Hypertension    Uremic encephalopathy    Acute kidney injury (CMS/HCC)    Acute pancreatitis    Hemorrhagic shock (CMS/HCC)    Thrombocytopenia (CMS/HCC)    Type 2 diabetes mellitus with hyperglycemia (CMS/HCC)    Dementia without behavioral disturbance (CMS/HCC)    Acute posthemorrhagic anemia          Plan:   (GIB/Hemorrhagic Shock: required transfusion and Kcentra initially. Duodenal ulcer and erosive gastritis noted on EGD. Anticoagulation held. Also has oral component of blood loss due to erosions of oral mucosa. GI, Surgery, and Oncology evaluated. All have recommended palliative care given her poor prognosis and multiorgan failure. Has continued oral oozing and had additional rectal bleed several days ago. Hgb with stable at 10.7 today. Is on BID IV  protonix.      Subacute left MCA CVA with aphasia: Subacute based on CT. Neurology evaluated. She is not AC or antiplatelet candidate at this time. She had this even while she was on Eliquis and aspirin prior to admission. Aphasic speech persists. SLP has started modified diet based on their eval 4/6. She is taking po better now after I explained to her that if she couldn't/wouldn't eat enough to support life then a PEG tube would be the only option. Both she and her  have confirmed they are not interested in PEG tube. Calorie count started.     Dementia with metabolic encephalopathy: unchanged      Leukocytosis: resolved. No antibiotics indicated. Infectious Disease has evaluated.     AFib: on Cardizem gtt and bisoprolol and is in SR. Rates good.     HTN: is off Cardene gtt--BP still running a little high but responding to PRN IV hydralazine.  Remains on Cardizem gtt but transitioning to PO now per Card.     TCP: resolved     KIERAN: HD last on 4/6. Shiley now removed. Creatinine 0.98 today. Na+ improved today a bit. IVFs ordered per Nephrology.     Hypokalemia:  Defer to Nephrology.      DM2: continue SSI, sugars up with increased po intake, will increase SSI dose algorithm.      Disposition: Poor longterm prognosis. Continue hernandez for comfort while awaiting family decision regarding goals of care. If she can take in sufficient calories by mouth then she may be more appropriate for LTC.  (have tried all the numbers I have for pt's  and there is no answer today)).       Ajit Elder MD  04/13/20  13:45    Time: 35min

## 2020-04-13 NOTE — PLAN OF CARE
Pt mentation improved by noon, dialysis cath removed per order, no complications  Problem: Fall Risk (Adult)  Goal: Absence of Fall  Outcome: Ongoing (interventions implemented as appropriate)     Problem: Restraint, Nonbehavioral (Nonviolent)  Goal: Rationale and Justification  Outcome: Ongoing (interventions implemented as appropriate)  Goal: Nonbehavioral (Nonviolent) Restraint: Absence of Injury/Harm  Outcome: Ongoing (interventions implemented as appropriate)  Goal: Nonbehavioral (Nonviolent) Restraint: Achievement of Discontinuation Criteria  Outcome: Ongoing (interventions implemented as appropriate)  Goal: Nonbehavioral (Nonviolent) Restraint: Preservation of Dignity and Wellbeing  Outcome: Ongoing (interventions implemented as appropriate)     Problem: Renal Failure/Kidney Injury, Acute (Adult)  Goal: Signs and Symptoms of Listed Potential Problems Will be Absent, Minimized or Managed (Renal Failure/Kidney Injury, Acute)  Outcome: Ongoing (interventions implemented as appropriate)     Problem: Pain, Acute (Adult)  Goal: Acceptable Pain Control/Comfort Level  Outcome: Ongoing (interventions implemented as appropriate)     Problem: Gastrointestinal Bleeding (Adult)  Goal: Signs and Symptoms of Listed Potential Problems Will be Absent, Minimized or Managed (Gastrointestinal Bleeding)  Outcome: Ongoing (interventions implemented as appropriate)     Problem: Skin Injury Risk (Adult)  Goal: Skin Health and Integrity  Outcome: Ongoing (interventions implemented as appropriate)     Problem: Patient Care Overview  Goal: Plan of Care Review  Outcome: Ongoing (interventions implemented as appropriate)  Goal: Individualization and Mutuality  Outcome: Ongoing (interventions implemented as appropriate)  Goal: Discharge Needs Assessment  Outcome: Ongoing (interventions implemented as appropriate)  Goal: Interprofessional Rounds/Family Conf  Outcome: Ongoing (interventions implemented as appropriate)

## 2020-04-13 NOTE — PROGRESS NOTES
Continued Stay Note  [facility]     Patient Name: Darby Workman  MRN: [id number]  Today's Date: 4/13/2020    Admit Date: 3/27/2020    Discharge Plan     Row Name 04/13/20 1402       Plan    Plan  return to [facility]- family still deciding on palliative    Plan Comments  Family making decision r/t palliative care.  Spoke with [facility] and [facility] can accept with [facility] or as comfort care if that is what the family decides. Loren Sanford RN        Discharge Codes    No documentation.             Loren Sanford RN

## 2020-04-13 NOTE — PROGRESS NOTES
"   LOS: 17 days    Patient Care Team:  Eric Matta MD as PCP - General (General Practice)    Chief Complaint:    Chief Complaint   Patient presents with   • Altered Mental Status   • Black or Bloody Stool     Follow-up acute kidney  Subjective     Interval History:   Patient is obtunded, she is in restraints today, she had significant oral ulcers also on the lips.    Review of Systems:   Unable to obtain reliably    Objective     Vital Signs  Temp:  [97.2 °F (36.2 °C)-98.7 °F (37.1 °C)] 97.2 °F (36.2 °C)  Heart Rate:  [53-64] 60  Resp:  [16] 16  BP: (147-183)/(66-81) 168/78    Flowsheet Rows      First Filed Value   Admission Height  165.1 cm (65\") Documented at 03/27/2020 1029   Admission Weight  60.3 kg (133 lb) Documented at 03/27/2020 1029          No intake/output data recorded.  I/O last 3 completed shifts:  In: 120 [P.O.:120]  Out: 1475 [Urine:1475]    Intake/Output Summary (Last 24 hours) at 4/13/2020 0821  Last data filed at 4/13/2020 0351  Gross per 24 hour   Intake 120 ml   Output 825 ml   Net -705 ml       Physical Exam:  General Appearance: Obtunded, chronically ill, no acute distress  Skin: warm and dry  HEENT: pupils round and reactive to light, significant ulceration of the oral mucosa on the lips, nonicteric sclera  Neck: supple, no JVD, trachea midline, temporary dialysis catheter in the left IJ  Lungs: Bilateral rhonchi, unlabored breathing effort  Heart: Irregularly irregular, no rub  Abdomen: soft, no guarding,  present bowel sounds to auscultation  : no palpable bladder,  Extremities: no edema, cyanosis or clubbing  Neuro: Unable to assess          Results Review:    Results from last 7 days   Lab Units 04/13/20  0523 04/12/20  0706 04/10/20  0613  04/09/20  0552 04/08/20  0533   SODIUM mmol/L 152* 158* 147*  --  146* 145   POTASSIUM mmol/L 3.8 3.2* 3.1*   < > 3.2* 3.2*   CHLORIDE mmol/L 117* 118* 107  --  107 106   CO2 mmol/L 27.2 27.0 25.6  --  23.7 23.8   BUN mg/dL 26* 32* 36*  --  35* " 35*   CREATININE mg/dL 0.98 1.02* 1.05*  --  1.21* 1.38*   CALCIUM mg/dL 8.7 9.0 9.0  --  8.5* 8.6   BILIRUBIN mg/dL  --   --  1.5*  --  1.5* 1.7*   ALK PHOS U/L  --   --  282*  --  297* 330*   ALT (SGPT) U/L  --   --  26  --  24 29   AST (SGOT) U/L  --   --  23  --  27 27   GLUCOSE mg/dL 293* 217* 203*  --  188* 171*    < > = values in this interval not displayed.       Estimated Creatinine Clearance: 41.9 mL/min (by C-G formula based on SCr of 0.98 mg/dL).    Results from last 7 days   Lab Units 04/13/20  0523 04/12/20  0706 04/10/20  0613   MAGNESIUM mg/dL 2.0 1.7 1.8   PHOSPHORUS mg/dL 2.2* 2.5 2.3*             Results from last 7 days   Lab Units 04/13/20  0523 04/12/20  0706 04/10/20  0613 04/09/20  0552 04/08/20  0533   WBC 10*3/mm3 6.32 5.71 7.16 6.73 6.39   HEMOGLOBIN g/dL 10.7* 10.8* 9.7* 9.4* 9.8*   PLATELETS 10*3/mm3 254 276 258 233 188               Imaging Results (Last 24 Hours)     ** No results found for the last 24 hours. **          dilTIAZem 60 mg Oral Q6H   insulin lispro 0-7 Units Subcutaneous 4x Daily With Meals & Nightly   magnesium sulfate 2 g Intravenous Once   nebivolol 20 mg Oral Q24H   pantoprazole 40 mg Intravenous BID AC       dextrose 75 mL/hr Last Rate: 75 mL/hr (04/13/20 0612)   dilTIAZem 5-15 mg/hr Last Rate: Stopped (04/12/20 1752)       Medication Review:   Current Facility-Administered Medications   Medication Dose Route Frequency Provider Last Rate Last Dose   • acetaminophen (TYLENOL) tablet 650 mg  650 mg Oral Q4H PRN Boogie Saavedra MD        Or   • acetaminophen (TYLENOL) suppository 650 mg  650 mg Rectal Q4H PRN Boogie Saavedra MD       • dextrose (D50W) 25 g/ 50mL Intravenous Solution 25 g  25 g Intravenous Q15 Min PRN Boogie Saavedra MD   25 g at 03/30/20 0033   • dextrose (D5W) 5 % infusion  75 mL/hr Intravenous Continuous Chandana Hook MD 75 mL/hr at 04/13/20 0612 75 mL/hr at 04/13/20 0612   • dextrose (GLUTOSE) oral gel 15 g  15 g Oral Q15 Min PRN Keri  Boogie DUONG MD       • dilTIAZem (CARDIZEM) 100 mg in 100 mL NS (1 mg/mL) infusion  5-15 mg/hr Intravenous Titrated Lucy Marinelli APRN   Stopped at 04/12/20 1752   • dilTIAZem (CARDIZEM) tablet 60 mg  60 mg Oral Q6H Veronika Perez MD   60 mg at 04/12/20 2144   • glucagon (human recombinant) (GLUCAGEN DIAGNOSTIC) injection 1 mg  1 mg Subcutaneous Q15 Min PRN Boogie Saavedra MD       • hydrALAZINE (APRESOLINE) injection 20 mg  20 mg Intravenous Q6H PRN hCandana Kapoor MD   20 mg at 04/13/20 0343   • insulin lispro (humaLOG) injection 0-7 Units  0-7 Units Subcutaneous 4x Daily With Meals & Nightly Chandana Kapoor MD   3 Units at 04/12/20 2145   • ipratropium-albuterol (DUO-NEB) nebulizer solution 3 mL  3 mL Nebulization Q2H PRN Boogie Saavedra MD       • magic mouthwash oral supsension 15 mL  15 mL Swish & Spit Q4H PRN Mario Saavedra MD   15 mL at 04/01/20 2022   • magnesium sulfate in D5W 1g/100mL (PREMIX)  2 g Intravenous Once Chandana Hook MD       • metoprolol tartrate (LOPRESSOR) injection 5 mg  5 mg Intravenous Q6H PRN Roni Gutierrez, DO       • nebivolol (BYSTOLIC) tablet 20 mg  20 mg Oral Q24H Emily Davis MD   20 mg at 04/12/20 0849   • ondansetron (ZOFRAN) tablet 4 mg  4 mg Oral Q6H PRN Boogie Saavedra MD        Or   • ondansetron (ZOFRAN) injection 4 mg  4 mg Intravenous Q6H PRN Boogie Saavedra MD       • pantoprazole (PROTONIX) injection 40 mg  40 mg Intravenous BID AC Chandana Kapoor MD   40 mg at 04/13/20 0607       Assessment/Plan   1. KIERAN, non-oliguric, improving.  HD last on 4.6.20, and HD catheter removed 4.10.20.  Patient has hyponatremia sodium 152, potassium 3.8, total CO2 27.2.  Albumin 2.6, phosphorus 2.2 magnesium   2. GI bleed, hemorrhagic shock.  Duodenal ulcer, erosive gastritis on EGD.    3. Anemia of blood loss, GIB, oral mucosal bleeding. Hemoglobin today 10.7  4. Left subacute left MCA CVA.  Patient is known to have aphasia but currently  poorly responsive  5. Dementia  6. HTN.  Has systolic component but within acceptable range  7. DM2  8. Nutrition.  Not taking any po. Albumin 2.6.    9. PAF    Plan:  1.  Will run IV fluid D5W at 125 cc/h x 3 L  2.  Surveillance labs  3.  Prognosis very poor  4.  Remove the dialysis catheter    Christiano Acosta MD  04/13/20  08:21

## 2020-04-13 NOTE — PLAN OF CARE
Pt still very confused and restless at times. Remains in restraint so pt doesn't pull her shiley. Will continue to monitor pt

## 2020-04-13 NOTE — PROGRESS NOTES
Adult Nutrition  Assessment/PES    Patient Name:  Darby Workman  YOB: 1945  MRN: [id number]  Admit Date:  3/27/2020    Assessment Date:  4/13/2020    Comments:  Nutrition follow up for calorie count results.  Day 1 - 345 kcal (27% estimated kcal needs), 11 grams protein (17% estimated protein needs).    Spoke with CNA who reported patient is doing a little better today with PO intake.    Family has not made a decision yet in regards to palliative care.    Due to the COVID pandemic, nutrition assessment completed based on review of electronic medical record and discussion with CNA.  This RD currently working remotely and can be reached at [phone], via secure chat or email.     RD to continue to follow for calorie count results.    Reason for Assessment     Row Name 04/13/20 1432          Reason for Assessment    Reason For Assessment  calorie count order;follow-up protocol         Nutrition/Diet History     Row Name 04/13/20 1432          Nutrition/Diet History    Typical Food/Fluid Intake  continues with poor PO intake, but taking in a little more than usual per CNA today         Anthropometrics     Row Name 04/13/20 1432          Admit Weight    Admit Weight  -- 119# 4/13        Body Mass Index (BMI)    BMI Assessment  BMI 18.5-24.9: normal         Labs/Tests/Procedures/Meds     Row Name 04/13/20 1432          Labs/Procedures/Meds    Lab Results Reviewed  reviewed, pertinent     Lab Results Comments  Gluc, Na, BUN, GFR, Alb, Phos, Hgb, Hct        Diagnostic Tests/Procedures    Diagnostic Test/Procedure Reviewed  reviewed, pertinent        Medications    Pertinent Medications Reviewed  reviewed, pertinent     Pertinent Medications Comments  humalog, MgSO4, protonix, D5         Physical Findings     Row Name 04/13/20 1434          Physical Findings    Oral/Mouth Cavity  other (see comments) oral ulcers on lips     Skin  -- B=15, bruised           Nutrition Prescription Ordered     Row Name  04/13/20 1434          Nutrition Prescription PO    Current PO Diet  Pureed     Fluid Consistency  Nectar/syrup thick     Supplement  Boost Pudding (Ensure Pudding)     Supplement Frequency  3 times a day     Common Modifiers  Cardiac         Evaluation of Received Nutrient/Fluid Intake     Row Name 04/13/20 1435          Nutrient/Fluid Evaluation    Additional Documentation  Calories Evaluation (Group);Protein Evaluation (Group)        Calories Evaluation    Oral Calories (kcal)  345     % of Kcal Needs  27        Protein Evaluation    Oral Protein (gm)  11     % of Protein Needs  17        Intake Assessment    Energy/Calorie Requirement Assessment  not meeting needs     Protein Requirement Assessment  not meeting needs         Problem/Interventions:    Intervention Goal     Row Name 04/13/20 1435          Intervention Goal    General  Maintain nutrition;Meet nutritional needs for age/condition;Disease management/therapy;Reduce/improve symptoms     PO  Tolerate PO;Increase intake;PO intake (%)     PO Intake %  75 %     Weight  Maintain weight         Nutrition Intervention     Row Name 04/13/20 1436          Nutrition Intervention    RD/Tech Action  Follow Tx progress;Care plan reviewd     Recommended/Ordered  Supplement           Education/Evaluation     Row Name 04/13/20 1436          Education    Education  Will Instruct as appropriate        Monitor/Evaluation    Monitor  Per protocol;PO intake;Supplement intake;Pertinent labs;Weight;Skin status;Symptoms     Education Follow-up  Reinforce PRN           Electronically signed by:  Marialuisa Smith RD  04/13/20 14:36

## 2020-04-14 LAB
ALBUMIN SERPL-MCNC: 2.4 G/DL (ref 3.5–5.2)
ANION GAP SERPL CALCULATED.3IONS-SCNC: 8.4 MMOL/L (ref 5–15)
BASOPHILS # BLD AUTO: 0.02 10*3/MM3 (ref 0–0.2)
BASOPHILS NFR BLD AUTO: 0.4 % (ref 0–1.5)
BUN BLD-MCNC: 19 MG/DL (ref 8–23)
BUN/CREAT SERPL: 22.1 (ref 7–25)
CALCIUM SPEC-SCNC: 8.4 MG/DL (ref 8.6–10.5)
CHLORIDE SERPL-SCNC: 108 MMOL/L (ref 98–107)
CO2 SERPL-SCNC: 26.6 MMOL/L (ref 22–29)
CREAT BLD-MCNC: 0.86 MG/DL (ref 0.57–1)
DEPRECATED RDW RBC AUTO: 49.8 FL (ref 37–54)
EOSINOPHIL # BLD AUTO: 0.18 10*3/MM3 (ref 0–0.4)
EOSINOPHIL NFR BLD AUTO: 3.8 % (ref 0.3–6.2)
ERYTHROCYTE [DISTWIDTH] IN BLOOD BY AUTOMATED COUNT: 15.1 % (ref 12.3–15.4)
GFR SERPL CREATININE-BSD FRML MDRD: 64 ML/MIN/1.73
GLUCOSE BLD-MCNC: 250 MG/DL (ref 65–99)
GLUCOSE BLDC GLUCOMTR-MCNC: 161 MG/DL (ref 70–130)
GLUCOSE BLDC GLUCOMTR-MCNC: 187 MG/DL (ref 70–130)
GLUCOSE BLDC GLUCOMTR-MCNC: 225 MG/DL (ref 70–130)
GLUCOSE BLDC GLUCOMTR-MCNC: 82 MG/DL (ref 70–130)
HCT VFR BLD AUTO: 29.5 % (ref 34–46.6)
HGB BLD-MCNC: 9.6 G/DL (ref 12–15.9)
IMM GRANULOCYTES # BLD AUTO: 0.01 10*3/MM3 (ref 0–0.05)
IMM GRANULOCYTES NFR BLD AUTO: 0.2 % (ref 0–0.5)
LYMPHOCYTES # BLD AUTO: 0.66 10*3/MM3 (ref 0.7–3.1)
LYMPHOCYTES NFR BLD AUTO: 14 % (ref 19.6–45.3)
MAGNESIUM SERPL-MCNC: 1.7 MG/DL (ref 1.6–2.4)
MCH RBC QN AUTO: 29.7 PG (ref 26.6–33)
MCHC RBC AUTO-ENTMCNC: 32.5 G/DL (ref 31.5–35.7)
MCV RBC AUTO: 91.3 FL (ref 79–97)
MONOCYTES # BLD AUTO: 0.36 10*3/MM3 (ref 0.1–0.9)
MONOCYTES NFR BLD AUTO: 7.6 % (ref 5–12)
NEUTROPHILS # BLD AUTO: 3.5 10*3/MM3 (ref 1.7–7)
NEUTROPHILS NFR BLD AUTO: 74 % (ref 42.7–76)
NRBC BLD AUTO-RTO: 0 /100 WBC (ref 0–0.2)
PHOSPHATE SERPL-MCNC: 2.1 MG/DL (ref 2.5–4.5)
PLATELET # BLD AUTO: 216 10*3/MM3 (ref 140–450)
PMV BLD AUTO: 10.5 FL (ref 6–12)
POTASSIUM BLD-SCNC: 3.8 MMOL/L (ref 3.5–5.2)
RBC # BLD AUTO: 3.23 10*6/MM3 (ref 3.77–5.28)
SODIUM BLD-SCNC: 143 MMOL/L (ref 136–145)
URATE SERPL-MCNC: 5.1 MG/DL (ref 2.4–5.7)
WBC NRBC COR # BLD: 4.73 10*3/MM3 (ref 3.4–10.8)

## 2020-04-14 PROCEDURE — 82962 GLUCOSE BLOOD TEST: CPT

## 2020-04-14 PROCEDURE — 80069 RENAL FUNCTION PANEL: CPT | Performed by: INTERNAL MEDICINE

## 2020-04-14 PROCEDURE — 84550 ASSAY OF BLOOD/URIC ACID: CPT | Performed by: INTERNAL MEDICINE

## 2020-04-14 PROCEDURE — 85025 COMPLETE CBC W/AUTO DIFF WBC: CPT | Performed by: INTERNAL MEDICINE

## 2020-04-14 PROCEDURE — 83735 ASSAY OF MAGNESIUM: CPT | Performed by: NURSE PRACTITIONER

## 2020-04-14 PROCEDURE — 63710000001 INSULIN LISPRO (HUMAN) PER 5 UNITS: Performed by: HOSPITALIST

## 2020-04-14 RX ORDER — DILTIAZEM HYDROCHLORIDE 60 MG/1
60 TABLET, FILM COATED ORAL EVERY 8 HOURS SCHEDULED
Status: DISCONTINUED | OUTPATIENT
Start: 2020-04-14 | End: 2020-04-20 | Stop reason: HOSPADM

## 2020-04-14 RX ORDER — HYDRALAZINE HYDROCHLORIDE 10 MG/1
10 TABLET, FILM COATED ORAL EVERY 8 HOURS SCHEDULED
Status: DISCONTINUED | OUTPATIENT
Start: 2020-04-14 | End: 2020-04-15

## 2020-04-14 RX ADMIN — DILTIAZEM HYDROCHLORIDE 60 MG: 60 TABLET, FILM COATED ORAL at 22:00

## 2020-04-14 RX ADMIN — HYDRALAZINE HYDROCHLORIDE 10 MG: 10 TABLET, FILM COATED ORAL at 20:41

## 2020-04-14 RX ADMIN — HYDRALAZINE HYDROCHLORIDE 10 MG: 10 TABLET, FILM COATED ORAL at 21:13

## 2020-04-14 RX ADMIN — NEBIVOLOL HYDROCHLORIDE 20 MG: 10 TABLET ORAL at 08:51

## 2020-04-14 RX ADMIN — PANTOPRAZOLE SODIUM 40 MG: 40 INJECTION, POWDER, FOR SOLUTION INTRAVENOUS at 17:22

## 2020-04-14 RX ADMIN — DEXTROSE MONOHYDRATE 125 ML/HR: 50 INJECTION, SOLUTION INTRAVENOUS at 05:39

## 2020-04-14 RX ADMIN — INSULIN LISPRO 6 UNITS: 100 INJECTION, SOLUTION INTRAVENOUS; SUBCUTANEOUS at 08:56

## 2020-04-14 RX ADMIN — DILTIAZEM HYDROCHLORIDE 60 MG: 60 TABLET, FILM COATED ORAL at 08:51

## 2020-04-14 RX ADMIN — PANTOPRAZOLE SODIUM 40 MG: 40 INJECTION, POWDER, FOR SOLUTION INTRAVENOUS at 07:08

## 2020-04-14 RX ADMIN — INSULIN LISPRO 2 UNITS: 100 INJECTION, SOLUTION INTRAVENOUS; SUBCUTANEOUS at 02:00

## 2020-04-14 RX ADMIN — DILTIAZEM HYDROCHLORIDE 60 MG: 60 TABLET, FILM COATED ORAL at 05:29

## 2020-04-14 RX ADMIN — HYDRALAZINE HYDROCHLORIDE 10 MG: 10 TABLET, FILM COATED ORAL at 17:21

## 2020-04-14 NOTE — PLAN OF CARE
Pt has refused to eat lunch.  Maybe 1-2 bites of breakfast.  Took oral medication this AM with NTL.  Still waiting for family to make a decision on Palliative care.  HR in the 40's and /70's.  IVF stopped.  Will cont to monitor.      Problem: Fall Risk (Adult)  Goal: Absence of Fall  Outcome: Ongoing (interventions implemented as appropriate)     Problem: Restraint, Nonbehavioral (Nonviolent)  Goal: Rationale and Justification  Outcome: Ongoing (interventions implemented as appropriate)  Goal: Nonbehavioral (Nonviolent) Restraint: Absence of Injury/Harm  Outcome: Ongoing (interventions implemented as appropriate)  Goal: Nonbehavioral (Nonviolent) Restraint: Achievement of Discontinuation Criteria  Outcome: Ongoing (interventions implemented as appropriate)  Goal: Nonbehavioral (Nonviolent) Restraint: Preservation of Dignity and Wellbeing  Outcome: Ongoing (interventions implemented as appropriate)     Problem: Renal Failure/Kidney Injury, Acute (Adult)  Goal: Signs and Symptoms of Listed Potential Problems Will be Absent, Minimized or Managed (Renal Failure/Kidney Injury, Acute)  Outcome: Ongoing (interventions implemented as appropriate)     Problem: Pain, Acute (Adult)  Goal: Acceptable Pain Control/Comfort Level  Outcome: Ongoing (interventions implemented as appropriate)     Problem: Gastrointestinal Bleeding (Adult)  Goal: Signs and Symptoms of Listed Potential Problems Will be Absent, Minimized or Managed (Gastrointestinal Bleeding)  Outcome: Ongoing (interventions implemented as appropriate)     Problem: Skin Injury Risk (Adult)  Goal: Skin Health and Integrity  Outcome: Ongoing (interventions implemented as appropriate)     Problem: Patient Care Overview  Goal: Plan of Care Review  Outcome: Ongoing (interventions implemented as appropriate)  Goal: Individualization and Mutuality  Outcome: Ongoing (interventions implemented as appropriate)  Goal: Discharge Needs Assessment  Outcome: Ongoing  (interventions implemented as appropriate)  Goal: Interprofessional Rounds/Family Conf  Outcome: Ongoing (interventions implemented as appropriate)

## 2020-04-14 NOTE — PLAN OF CARE
Problem: Pain, Acute (Adult)  Goal: Acceptable Pain Control/Comfort Level  Outcome: Ongoing (interventions implemented as appropriate)     Problem: Gastrointestinal Bleeding (Adult)  Goal: Signs and Symptoms of Listed Potential Problems Will be Absent, Minimized or Managed (Gastrointestinal Bleeding)  Outcome: Ongoing (interventions implemented as appropriate)     Problem: Patient Care Overview  Goal: Plan of Care Review  Outcome: Ongoing (interventions implemented as appropriate)     Problem: Fall Risk (Adult)  Goal: Absence of Fall  Outcome: Ongoing (interventions implemented as appropriate)  Note:   Pt slept on and off thru the night, arouses easy, answers questions appropriately with nods and sometimes verbalizes a word which is coherent, able to understand simple commands. Pt tolerated releasing of soft restraints to kamala wrists, no observed pulling of dressings.Pt very weak, requires complete assist to turn and reposition, HR staying in low 50's to low 60's, afib, hr and b/p monitored, on po cardizem. Denies pain, nausea, frequent cold thickened fluids offered, tolerated fair. Oral/lips care provided, lips remains, scabby, lower lip occasionally bleeds.Cont to monitor closely.    Problem: Renal Failure/Kidney Injury, Acute (Adult)  Goal: Signs and Symptoms of Listed Potential Problems Will be Absent, Minimized or Managed (Renal Failure/Kidney Injury, Acute)  Outcome: Ongoing (interventions implemented as appropriate)

## 2020-04-14 NOTE — PROGRESS NOTES
"   LOS: 18 days    Patient Care Team:  Eric Matta MD as PCP - General (General Practice)    Chief Complaint:    Chief Complaint   Patient presents with   • Altered Mental Status   • Black or Bloody Stool     KIERAN  Subjective     Interval History:   Awakens easily.  Nods to some questions, repeats \"hot and cold\". Eating very little.  RN reports a little po for breakfast, no lunch.     Review of Systems:   Unable to obtain reliably    Objective     Vital Signs  Temp:  [97.4 °F (36.3 °C)-98.1 °F (36.7 °C)] 98.1 °F (36.7 °C)  Heart Rate:  [47-62] 47  Resp:  [16] 16  BP: (166-187)/(69-84) 174/75    Flowsheet Rows      First Filed Value   Admission Height  165.1 cm (65\") Documented at 03/27/2020 1029   Admission Weight  60.3 kg (133 lb) Documented at 03/27/2020 1029          I/O this shift:  In: -   Out: 350 [Urine:350]  I/O last 3 completed shifts:  In: 2120 [P.O.:120; I.V.:2000]  Out: 1575 [Urine:1575]    Intake/Output Summary (Last 24 hours) at 4/14/2020 1431  Last data filed at 4/14/2020 1150  Gross per 24 hour   Intake 2000 ml   Output 700 ml   Net 1300 ml       Physical Exam:  Elderly WF.  Oral mucosa less bloody.  Awake;   Neck no jvd  Heart Bradycardic, irregularly irregular, not tachycardic, no s3 or rub  Lungs clear to auscultation, no wheezing. Unlabored on supplemental O2  Abd + bs, soft, no guarding or rebound  Ext 1+ upper and 1+ lower ext edema.     SKin scattered ecchymoses      Results Review:    Results from last 7 days   Lab Units 04/14/20  0644 04/13/20  0523 04/12/20  0706 04/10/20  0613  04/09/20  0552 04/08/20  0533   SODIUM mmol/L 143 152* 158* 147*  --  146* 145   POTASSIUM mmol/L 3.8 3.8 3.2* 3.1*   < > 3.2* 3.2*   CHLORIDE mmol/L 108* 117* 118* 107  --  107 106   CO2 mmol/L 26.6 27.2 27.0 25.6  --  23.7 23.8   BUN mg/dL 19 26* 32* 36*  --  35* 35*   CREATININE mg/dL 0.86 0.98 1.02* 1.05*  --  1.21* 1.38*   CALCIUM mg/dL 8.4* 8.7 9.0 9.0  --  8.5* 8.6   BILIRUBIN mg/dL  --   --   --  1.5*  --  " 1.5* 1.7*   ALK PHOS U/L  --   --   --  282*  --  297* 330*   ALT (SGPT) U/L  --   --   --  26  --  24 29   AST (SGOT) U/L  --   --   --  23  --  27 27   GLUCOSE mg/dL 250* 293* 217* 203*  --  188* 171*    < > = values in this interval not displayed.       Estimated Creatinine Clearance: 48.5 mL/min (by C-G formula based on SCr of 0.86 mg/dL).    Results from last 7 days   Lab Units 04/14/20  0644 04/13/20  0523 04/12/20  0706   MAGNESIUM mg/dL 1.7 2.0 1.7   PHOSPHORUS mg/dL 2.1* 2.2* 2.5       Results from last 7 days   Lab Units 04/14/20  0644   URIC ACID mg/dL 5.1       Results from last 7 days   Lab Units 04/14/20  0816 04/13/20  0523 04/12/20  0706 04/10/20  0613 04/09/20  0552   WBC 10*3/mm3 4.73 6.32 5.71 7.16 6.73   HEMOGLOBIN g/dL 9.6* 10.7* 10.8* 9.7* 9.4*   PLATELETS 10*3/mm3 216 254 276 258 233               Imaging Results (Last 24 Hours)     ** No results found for the last 24 hours. **          dilTIAZem 60 mg Oral Q8H   insulin lispro 0-9 Units Subcutaneous 4x Daily With Meals & Nightly   magnesium sulfate 2 g Intravenous Once   nebivolol 20 mg Oral Q24H   pantoprazole 40 mg Intravenous BID AC       dilTIAZem 5-15 mg/hr Last Rate: Stopped (04/12/20 5492)       Medication Review:   Current Facility-Administered Medications   Medication Dose Route Frequency Provider Last Rate Last Dose   • acetaminophen (TYLENOL) tablet 650 mg  650 mg Oral Q4H PRN Boogie Saavedra MD        Or   • acetaminophen (TYLENOL) suppository 650 mg  650 mg Rectal Q4H PRN Boogie Saavedra MD       • dextrose (D50W) 25 g/ 50mL Intravenous Solution 25 g  25 g Intravenous Q15 Min PRN Boogie Saavedra MD   25 g at 03/30/20 0033   • dextrose (GLUTOSE) oral gel 15 g  15 g Oral Q15 Min PRN Booige Saavedra MD       • dilTIAZem (CARDIZEM) 100 mg in 100 mL NS (1 mg/mL) infusion  5-15 mg/hr Intravenous Titrated Lucy Marinelli, APRN   Stopped at 04/12/20 7107   • dilTIAZem (CARDIZEM) tablet 60 mg  60 mg Oral Q8H Ajit Elder MD        • glucagon (human recombinant) (GLUCAGEN DIAGNOSTIC) injection 1 mg  1 mg Subcutaneous Q15 Min PRN Boogie Saavedra MD       • hydrALAZINE (APRESOLINE) injection 20 mg  20 mg Intravenous Q6H PRN Chandana Kapoor MD   20 mg at 04/13/20 0343   • insulin lispro (humaLOG) injection 0-9 Units  0-9 Units Subcutaneous 4x Daily With Meals & Nightly Ajit Elder MD   6 Units at 04/14/20 0856   • ipratropium-albuterol (DUO-NEB) nebulizer solution 3 mL  3 mL Nebulization Q2H PRN Boogie Saavedra MD       • magic mouthwash oral supsension 15 mL  15 mL Swish & Spit Q4H PRN Mario Saavedra MD   15 mL at 04/01/20 2022   • magnesium sulfate in D5W 1g/100mL (PREMIX)  2 g Intravenous Once Chandana Hook MD       • metoprolol tartrate (LOPRESSOR) injection 5 mg  5 mg Intravenous Q6H PRN Roni Gutierrez DO       • nebivolol (BYSTOLIC) tablet 20 mg  20 mg Oral Q24H Emily Davis MD   20 mg at 04/14/20 0851   • ondansetron (ZOFRAN) tablet 4 mg  4 mg Oral Q6H PRN Boogie Saavedra MD        Or   • ondansetron (ZOFRAN) injection 4 mg  4 mg Intravenous Q6H PRN Boogie Saavedra MD       • pantoprazole (PROTONIX) injection 40 mg  40 mg Intravenous BID AC Chandana Kapoor MD   40 mg at 04/14/20 0708       Assessment/Plan   1. KIERAN, non-oliguric, improving.  HD last on 4.6.20, and HD catheter removed 4.10.20.  Significant hypernatremia due to insufficient water intake.  Sodium better with IVF, but doubt she will be able to maintain enough water intake po.   Replace phosphorus if still low tomorrow .  2. GI bleed, hemorrhagic shock.  Duodenal ulcer, erosive gastritis on EGD. Hg down some with IVF .  3. Anemia of blood loss, GIB, oral mucosal bleeding.  4. Left subacute left MCA CVA. Aphasia .  5. Dementia  6. HTN. Not controlled. Add po hydralazine.  No room to go up on Bystolic since bradycardic.   7. DM2  8. Nutrition. Minimal po intake . Albumin 2.6.    9. PAF    Plan:  1.  DC IVF      Emily Davis,  MD  04/14/20  14:31

## 2020-04-14 NOTE — PROGRESS NOTES
Adult Nutrition  Assessment/PES    Patient Name:  Darby Workman  YOB: 1945  MRN: [id number]  Admit Date:  3/27/2020    Assessment Date:  4/14/2020    Comments:  Nutrition follow up d/t calorie count in progress.  Day 2 results - < 100 kcal (5% estimated needs), 1 gram protein (2% estimated protein needs).    Continues to eat VERY minimal amounts.  RN today reports patient refused lunch.  Only taking bites.    Per MD note, patient and  do no want PEG.    Patient not taking in enough PO to sustain her.    Due to the COVID pandemic, nutrition assessment completed based on review of electronic medical record and discussion with RN.  This RD currently working remotely and can be reached at [phone], via secure chat or email.     RD will follow up tomorrow for day 3 results.    Reason for Assessment     Row Name 04/14/20 1435          Reason for Assessment    Reason For Assessment  follow-up protocol;calorie count order         Anthropometrics     Row Name 04/14/20 1435          Admit Weight    Admit Weight  -- 121# 4/14        Body Mass Index (BMI)    BMI Assessment  BMI 18.5-24.9: normal         Labs/Tests/Procedures/Meds     Row Name 04/14/20 1436          Labs/Procedures/Meds    Lab Results Reviewed  reviewed, pertinent        Diagnostic Tests/Procedures    Diagnostic Test/Procedure Reviewed  reviewed, pertinent     Diagnostic Test/Procedures Comments  dialysis cath removed yesterday        Medications    Pertinent Medications Reviewed  reviewed, pertinent         Nutrition Prescription Ordered     Row Name 04/14/20 1436          Nutrition Prescription PO    Current PO Diet  Pureed     Fluid Consistency  Nectar/syrup thick     Supplement  Boost Pudding (Ensure Pudding)     Supplement Frequency  3 times a day         Evaluation of Received Nutrient/Fluid Intake     Row Name 04/14/20 1437          Nutrient/Fluid Evaluation    Additional Documentation  Calories Evaluation (Group);Protein  Evaluation (Group)        Calories Evaluation    Oral Calories (kcal)  61     % of Kcal Needs  5        Protein Evaluation    Enteral Protein (gm)  1     % of Protein Needs  2        Intake Assessment    Energy/Calorie Requirement Assessment  not meeting needs     Protein Requirement Assessment  not meeting needs         Problem/Interventions:    Intervention Goal     Row Name 04/14/20 1438          Intervention Goal    General  Maintain nutrition;Reduce/improve symptoms;Disease management/therapy;Meet nutritional needs for age/condition     PO  Tolerate PO;Increase intake;PO intake (%)     PO Intake %  75 %     Weight  Maintain weight         Nutrition Intervention     Row Name 04/14/20 1439          Nutrition Intervention    RD/Tech Action  Follow Tx progress;Care plan reviewd     Recommended/Ordered  Supplement         Education/Evaluation     Row Name 04/14/20 1439          Education    Education  Will Instruct as appropriate        Monitor/Evaluation    Monitor  Per protocol;PO intake;Supplement intake;Weight;Skin status;Symptoms     Education Follow-up  Reinforce PRN           Electronically signed by:  Marialuisa Smith RD  04/14/20 14:39

## 2020-04-14 NOTE — PROGRESS NOTES
"   LOS: 18 days   Patient Care Team:  Eric Matta MD as PCP - General (General Practice)    Chief Complaint: none today    Subjective     Pt denies SOA, pain. Aphasic. Understands what I'm saying and can answer yes/no questions appropriately. Is trying to eat more.       Subjective:  Symptoms:  Stable.  No shortness of breath, chest pain, anorexia or diarrhea.    Diet:  Poor intake (improving).  No vomiting.    Activity level: Impaired due to weakness.    Pain:  She reports no pain.        History taken from: patient chart RN    Objective     Vital Signs  Temp:  [97.4 °F (36.3 °C)-98.1 °F (36.7 °C)] 98.1 °F (36.7 °C)  Heart Rate:  [47-62] 47  Resp:  [16] 16  BP: (166-187)/(69-84) 174/75    I/O last 3 completed shifts:  In: 2120 [P.O.:120; I.V.:2000]  Out: 1575 [Urine:1575]  No intake/output data recorded.        Objective:  General Appearance:  Comfortable, in no acute distress and ill-appearing.    Vital signs: (most recent): Blood pressure 174/75, pulse (!) 47, temperature 98.1 °F (36.7 °C), temperature source Oral, resp. rate 16, height 165.1 cm (65\"), weight 55.2 kg (121 lb 11.1 oz), SpO2 100 %.  Vital signs are normal.  No fever.    Output: Producing urine.    HEENT: (Dried blood around mouth, mucosa looking better  Dressings to bilateral anterior neck access sites)    Lungs:  Normal effort and normal respiratory rate.  Breath sounds clear to auscultation.  She is not in respiratory distress.  (Anteriorly)  Heart: Normal rate.  Regular rhythm.    Abdomen: Abdomen is soft.  Bowel sounds are normal.   There is no abdominal tenderness.     Extremities: There is dependent edema (of BUEs).    Pulses: Distal pulses are intact.    Neurological: Patient is alert.  (Aphasic, can follow instructions, some mild right sided weakness but difficult to quantify).    Pupils:  Pupils are equal, round, and reactive to light.    Skin:  Warm and dry.              Results Review:     I reviewed the patient's new clinical " results.  I reviewed the patient's other test results and agree with the interpretation  I personally viewed and interpreted the patient's EKG/Telemetry data  Discussed with pt and RN    Results from last 7 days   Lab Units 04/14/20  0816 04/13/20  0523 04/12/20  0706 04/10/20  0613 04/09/20  0552 04/08/20  0533   WBC 10*3/mm3 4.73 6.32 5.71 7.16 6.73 6.39   HEMOGLOBIN g/dL 9.6* 10.7* 10.8* 9.7* 9.4* 9.8*   PLATELETS 10*3/mm3 216 254 276 258 233 188     Results from last 7 days   Lab Units 04/14/20  0644 04/13/20  0523 04/12/20  0706 04/10/20  0613 04/09/20  1218 04/09/20  0552 04/08/20  0533   SODIUM mmol/L 143 152* 158* 147*  --  146* 145   POTASSIUM mmol/L 3.8 3.8 3.2* 3.1* 3.5 3.2* 3.2*   CHLORIDE mmol/L 108* 117* 118* 107  --  107 106   CO2 mmol/L 26.6 27.2 27.0 25.6  --  23.7 23.8   BUN mg/dL 19 26* 32* 36*  --  35* 35*   CREATININE mg/dL 0.86 0.98 1.02* 1.05*  --  1.21* 1.38*   CALCIUM mg/dL 8.4* 8.7 9.0 9.0  --  8.5* 8.6   MAGNESIUM mg/dL 1.7 2.0 1.7 1.8  --  1.5* 1.6   PHOSPHORUS mg/dL 2.1* 2.2* 2.5 2.3*  --   --   --    Estimated Creatinine Clearance: 48.5 mL/min (by C-G formula based on SCr of 0.86 mg/dL).    Medication Review: reviewed and adjusted    Assessment/Plan       Upper GI bleed    Hyperlipidemia    Hypertension    Uremic encephalopathy    Acute kidney injury (CMS/HCC)    Acute pancreatitis    Hemorrhagic shock (CMS/HCC)    Thrombocytopenia (CMS/HCC)    Type 2 diabetes mellitus with hyperglycemia (CMS/HCC)    Dementia without behavioral disturbance (CMS/HCC)    Acute posthemorrhagic anemia          Plan:   (GIB/Hemorrhagic Shock: required transfusion and Kcentra initially. Duodenal ulcer and erosive gastritis noted on EGD. Anticoagulation held. Also had oral component of blood loss due to erosions of oral mucosa. GI, Surgery, and Oncology evaluated. All have recommended palliative care given her poor prognosis and multiorgan failure. Hgb down a little at 9.6 today. Is on BID IV protonix.       Subacute left MCA CVA with aphasia: Subacute based on CT. Neurology evaluated. She is not AC or antiplatelet candidate at this time. She had this even while she was on Eliquis and aspirin prior to admission. Aphasic speech persists. SLP has started modified diet based on their eval 4/6. She is taking po better now after I explained to her that if she couldn't/wouldn't eat enough to support life then a PEG tube would be the only option. Both she and her  have confirmed they are not interested in PEG tube. Calorie count in progress.     Dementia with metabolic encephalopathy: unchanged      Leukocytosis: resolved. No antibiotics indicated. Infectious Disease has evaluated.     AFib: on both oral Cardizem and Bystolic and is in NSR. Rate low this AM, await Card recs. Will decrease dose of Cardizem (change to Q8h).     HTN: is off Cardene gtt--BP still running a little high but responding to PRN IV hydralazine. Continue po Cardizem and Bystolic.     TCP: resolved     KIERAN: HD last on 4/6. Shiley now removed. Creatinine 0.86 today. Na+ improved today a bit.    Hypernatremia: Na+ normalized today after D5W.     Hypokalemia:  wnl today. Defer to Nephrology.      DM2: continue SSI, sugars up with increased po intake, increased SSI dose algorithm.      Disposition: Poor longterm prognosis. Continue hernandez for comfort while awaiting family decision regarding goals of care. If she can take in sufficient calories by mouth then she may be more appropriate for LTC. Out of restraints now.  (have tried all the numbers I have for pt's  and there is no answer today)).       Ajit Elder MD  04/14/20  11:06    Time: 35min

## 2020-04-14 NOTE — NURSING NOTE
IV TEAM called to place a piv. ALIIRO piv removed reddened, hard cord. Evaluated MAYRA with U/S swelling noted. Attempted twice with no success. No veins accessible for a midline or a PIcc at this time pt will need to be evaluated for a different access.

## 2020-04-15 LAB
ALBUMIN SERPL-MCNC: 2.2 G/DL (ref 3.5–5.2)
ANION GAP SERPL CALCULATED.3IONS-SCNC: 7.7 MMOL/L (ref 5–15)
BASOPHILS # BLD AUTO: 0.02 10*3/MM3 (ref 0–0.2)
BASOPHILS NFR BLD AUTO: 0.5 % (ref 0–1.5)
BUN BLD-MCNC: 17 MG/DL (ref 8–23)
BUN/CREAT SERPL: 19.5 (ref 7–25)
CALCIUM SPEC-SCNC: 8.4 MG/DL (ref 8.6–10.5)
CHLORIDE SERPL-SCNC: 106 MMOL/L (ref 98–107)
CO2 SERPL-SCNC: 27.3 MMOL/L (ref 22–29)
CREAT BLD-MCNC: 0.87 MG/DL (ref 0.57–1)
DEPRECATED RDW RBC AUTO: 47.8 FL (ref 37–54)
EOSINOPHIL # BLD AUTO: 0.12 10*3/MM3 (ref 0–0.4)
EOSINOPHIL NFR BLD AUTO: 3.3 % (ref 0.3–6.2)
ERYTHROCYTE [DISTWIDTH] IN BLOOD BY AUTOMATED COUNT: 15.1 % (ref 12.3–15.4)
GFR SERPL CREATININE-BSD FRML MDRD: 63 ML/MIN/1.73
GLUCOSE BLD-MCNC: 201 MG/DL (ref 65–99)
GLUCOSE BLDC GLUCOMTR-MCNC: 178 MG/DL (ref 70–130)
GLUCOSE BLDC GLUCOMTR-MCNC: 211 MG/DL (ref 70–130)
GLUCOSE BLDC GLUCOMTR-MCNC: 240 MG/DL (ref 70–130)
GLUCOSE BLDC GLUCOMTR-MCNC: 247 MG/DL (ref 70–130)
HCT VFR BLD AUTO: 27.3 % (ref 34–46.6)
HGB BLD-MCNC: 9.1 G/DL (ref 12–15.9)
IMM GRANULOCYTES # BLD AUTO: 0.02 10*3/MM3 (ref 0–0.05)
IMM GRANULOCYTES NFR BLD AUTO: 0.5 % (ref 0–0.5)
LYMPHOCYTES # BLD AUTO: 0.61 10*3/MM3 (ref 0.7–3.1)
LYMPHOCYTES NFR BLD AUTO: 16.6 % (ref 19.6–45.3)
MAGNESIUM SERPL-MCNC: 1.6 MG/DL (ref 1.6–2.4)
MCH RBC QN AUTO: 29.3 PG (ref 26.6–33)
MCHC RBC AUTO-ENTMCNC: 33.3 G/DL (ref 31.5–35.7)
MCV RBC AUTO: 87.8 FL (ref 79–97)
MONOCYTES # BLD AUTO: 0.23 10*3/MM3 (ref 0.1–0.9)
MONOCYTES NFR BLD AUTO: 6.3 % (ref 5–12)
NEUTROPHILS # BLD AUTO: 2.67 10*3/MM3 (ref 1.7–7)
NEUTROPHILS NFR BLD AUTO: 72.8 % (ref 42.7–76)
NRBC BLD AUTO-RTO: 0 /100 WBC (ref 0–0.2)
PHOSPHATE SERPL-MCNC: 2.4 MG/DL (ref 2.5–4.5)
PLATELET # BLD AUTO: 215 10*3/MM3 (ref 140–450)
PMV BLD AUTO: 10.3 FL (ref 6–12)
POTASSIUM BLD-SCNC: 3.3 MMOL/L (ref 3.5–5.2)
RBC # BLD AUTO: 3.11 10*6/MM3 (ref 3.77–5.28)
SODIUM BLD-SCNC: 141 MMOL/L (ref 136–145)
WBC NRBC COR # BLD: 3.67 10*3/MM3 (ref 3.4–10.8)

## 2020-04-15 PROCEDURE — 63710000001 INSULIN LISPRO (HUMAN) PER 5 UNITS: Performed by: HOSPITALIST

## 2020-04-15 PROCEDURE — 83735 ASSAY OF MAGNESIUM: CPT | Performed by: NURSE PRACTITIONER

## 2020-04-15 PROCEDURE — 80069 RENAL FUNCTION PANEL: CPT | Performed by: HOSPITALIST

## 2020-04-15 PROCEDURE — 82962 GLUCOSE BLOOD TEST: CPT

## 2020-04-15 PROCEDURE — 92526 ORAL FUNCTION THERAPY: CPT

## 2020-04-15 PROCEDURE — 85025 COMPLETE CBC W/AUTO DIFF WBC: CPT | Performed by: INTERNAL MEDICINE

## 2020-04-15 RX ORDER — POTASSIUM CHLORIDE 1.5 G/1.77G
40 POWDER, FOR SOLUTION ORAL ONCE
Status: COMPLETED | OUTPATIENT
Start: 2020-04-15 | End: 2020-04-15

## 2020-04-15 RX ORDER — TORSEMIDE 20 MG/1
20 TABLET ORAL DAILY
Status: DISCONTINUED | OUTPATIENT
Start: 2020-04-15 | End: 2020-04-16

## 2020-04-15 RX ORDER — HYDRALAZINE HYDROCHLORIDE 25 MG/1
25 TABLET, FILM COATED ORAL EVERY 8 HOURS SCHEDULED
Status: DISCONTINUED | OUTPATIENT
Start: 2020-04-15 | End: 2020-04-20 | Stop reason: HOSPADM

## 2020-04-15 RX ORDER — PANTOPRAZOLE SODIUM 40 MG/1
40 TABLET, DELAYED RELEASE ORAL
Status: DISCONTINUED | OUTPATIENT
Start: 2020-04-15 | End: 2020-04-20 | Stop reason: HOSPADM

## 2020-04-15 RX ADMIN — PANTOPRAZOLE SODIUM 40 MG: 40 TABLET, DELAYED RELEASE ORAL at 17:02

## 2020-04-15 RX ADMIN — PANTOPRAZOLE SODIUM 40 MG: 40 TABLET, DELAYED RELEASE ORAL at 08:14

## 2020-04-15 RX ADMIN — INSULIN LISPRO 4 UNITS: 100 INJECTION, SOLUTION INTRAVENOUS; SUBCUTANEOUS at 12:13

## 2020-04-15 RX ADMIN — INSULIN LISPRO 4 UNITS: 100 INJECTION, SOLUTION INTRAVENOUS; SUBCUTANEOUS at 23:00

## 2020-04-15 RX ADMIN — DILTIAZEM HYDROCHLORIDE 60 MG: 60 TABLET, FILM COATED ORAL at 07:00

## 2020-04-15 RX ADMIN — TORSEMIDE 20 MG: 20 TABLET ORAL at 10:58

## 2020-04-15 RX ADMIN — NEBIVOLOL HYDROCHLORIDE 20 MG: 10 TABLET ORAL at 08:06

## 2020-04-15 RX ADMIN — INSULIN LISPRO 2 UNITS: 100 INJECTION, SOLUTION INTRAVENOUS; SUBCUTANEOUS at 08:06

## 2020-04-15 RX ADMIN — HYDRALAZINE HYDROCHLORIDE 10 MG: 10 TABLET, FILM COATED ORAL at 06:14

## 2020-04-15 RX ADMIN — POTASSIUM CHLORIDE 40 MEQ: 1.5 POWDER, FOR SOLUTION ORAL at 10:58

## 2020-04-15 RX ADMIN — HYDRALAZINE HYDROCHLORIDE 25 MG: 25 TABLET, FILM COATED ORAL at 14:07

## 2020-04-15 RX ADMIN — DILTIAZEM HYDROCHLORIDE 60 MG: 60 TABLET, FILM COATED ORAL at 14:07

## 2020-04-15 RX ADMIN — DILTIAZEM HYDROCHLORIDE 60 MG: 60 TABLET, FILM COATED ORAL at 22:30

## 2020-04-15 RX ADMIN — HYDRALAZINE HYDROCHLORIDE 25 MG: 25 TABLET, FILM COATED ORAL at 22:30

## 2020-04-15 RX ADMIN — INSULIN LISPRO 4 UNITS: 100 INJECTION, SOLUTION INTRAVENOUS; SUBCUTANEOUS at 17:14

## 2020-04-15 NOTE — PLAN OF CARE
Pt has been better with eating and taking medications today.   spoke to Dr. Elder about pt and family wants SNF at Discharge.  VSS.  Will cont to monitor.    Problem: Fall Risk (Adult)  Goal: Absence of Fall  Outcome: Ongoing (interventions implemented as appropriate)     Problem: Restraint, Nonbehavioral (Nonviolent)  Goal: Rationale and Justification  Outcome: Ongoing (interventions implemented as appropriate)  Goal: Nonbehavioral (Nonviolent) Restraint: Absence of Injury/Harm  Outcome: Ongoing (interventions implemented as appropriate)  Goal: Nonbehavioral (Nonviolent) Restraint: Achievement of Discontinuation Criteria  Outcome: Ongoing (interventions implemented as appropriate)  Goal: Nonbehavioral (Nonviolent) Restraint: Preservation of Dignity and Wellbeing  Outcome: Ongoing (interventions implemented as appropriate)     Problem: Renal Failure/Kidney Injury, Acute (Adult)  Goal: Signs and Symptoms of Listed Potential Problems Will be Absent, Minimized or Managed (Renal Failure/Kidney Injury, Acute)  Outcome: Ongoing (interventions implemented as appropriate)     Problem: Pain, Acute (Adult)  Goal: Acceptable Pain Control/Comfort Level  Outcome: Ongoing (interventions implemented as appropriate)     Problem: Gastrointestinal Bleeding (Adult)  Goal: Signs and Symptoms of Listed Potential Problems Will be Absent, Minimized or Managed (Gastrointestinal Bleeding)  Outcome: Ongoing (interventions implemented as appropriate)     Problem: Skin Injury Risk (Adult)  Goal: Skin Health and Integrity  Outcome: Ongoing (interventions implemented as appropriate)     Problem: Patient Care Overview  Goal: Plan of Care Review  Outcome: Ongoing (interventions implemented as appropriate)  Goal: Individualization and Mutuality  Outcome: Ongoing (interventions implemented as appropriate)  Goal: Discharge Needs Assessment  Outcome: Ongoing (interventions implemented as appropriate)  Goal: Interprofessional Rounds/Family  Conf  Outcome: Ongoing (interventions implemented as appropriate)

## 2020-04-15 NOTE — PROGRESS NOTES
"   LOS: 19 days   Patient Care Team:  Eric Matta MD as PCP - General (General Practice)    Chief Complaint: none today    Subjective     Pt denies SOA, pain. Aphasic. Understands what I'm saying and can answer yes/no questions appropriately. Is trying to eat more. Seems much better overall today.      Subjective:  Symptoms:  Improved.  She reports weakness (right sided).  No shortness of breath, chest pain, chest pressure, anorexia or diarrhea.    Diet:  Poor intake (improving).  No vomiting.    Activity level: Impaired due to weakness.    Pain:  She reports no pain.        History taken from: patient chart RN    Objective     Vital Signs  Temp:  [97.1 °F (36.2 °C)-98.4 °F (36.9 °C)] 97.1 °F (36.2 °C)  Heart Rate:  [55-63] 59  Resp:  [15-17] 16  BP: (152-192)/(76-82) 192/79    I/O last 3 completed shifts:  In: 2000 [I.V.:2000]  Out: 900 [Urine:900]  No intake/output data recorded.        Objective:  General Appearance:  Comfortable and in no acute distress.    Vital signs: (most recent): Blood pressure (!) 192/79, pulse 59, temperature 97.1 °F (36.2 °C), temperature source Oral, resp. rate 16, height 165.1 cm (65\"), weight 56.9 kg (125 lb 7.1 oz), SpO2 98 %.  Vital signs are normal.  No fever.  (BPs high).    Output: Producing urine.    HEENT: (Dried blood around mouth, mucosa looking better  Dressings to bilateral anterior neck access sites)    Lungs:  Normal effort and normal respiratory rate.  Breath sounds clear to auscultation.  She is not in respiratory distress.  (Anteriorly)  Heart: Normal rate.  Regular rhythm.    Abdomen: Abdomen is soft.  Bowel sounds are normal.   There is no abdominal tenderness.     Extremities: There is dependent edema (of BUEs).    Pulses: Distal pulses are intact.    Neurological: Patient is alert.  (Aphasic, can follow instructions, right sided weakness but difficult to quantify).    Pupils:  Pupils are equal, round, and reactive to light.    Skin:  Warm and dry.  "             Results Review:     I reviewed the patient's new clinical results.  I reviewed the patient's other test results and agree with the interpretation  I personally viewed and interpreted the patient's EKG/Telemetry data  Discussed with pt and RN and CCP and     Results from last 7 days   Lab Units 04/15/20  0659 04/14/20  0816 04/13/20  0523 04/12/20  0706 04/10/20  0613 04/09/20  0552   WBC 10*3/mm3 3.67 4.73 6.32 5.71 7.16 6.73   HEMOGLOBIN g/dL 9.1* 9.6* 10.7* 10.8* 9.7* 9.4*   PLATELETS 10*3/mm3 215 216 254 276 258 233     Results from last 7 days   Lab Units 04/15/20  0659 04/14/20  0644 04/13/20  0523 04/12/20  0706 04/10/20  0613 04/09/20  1218 04/09/20  0552   SODIUM mmol/L 141 143 152* 158* 147*  --  146*   POTASSIUM mmol/L 3.3* 3.8 3.8 3.2* 3.1* 3.5 3.2*   CHLORIDE mmol/L 106 108* 117* 118* 107  --  107   CO2 mmol/L 27.3 26.6 27.2 27.0 25.6  --  23.7   BUN mg/dL 17 19 26* 32* 36*  --  35*   CREATININE mg/dL 0.87 0.86 0.98 1.02* 1.05*  --  1.21*   CALCIUM mg/dL 8.4* 8.4* 8.7 9.0 9.0  --  8.5*   MAGNESIUM mg/dL 1.6 1.7 2.0 1.7 1.8  --  1.5*   PHOSPHORUS mg/dL 2.4* 2.1* 2.2* 2.5 2.3*  --   --    Estimated Creatinine Clearance: 49.4 mL/min (by C-G formula based on SCr of 0.87 mg/dL).    Medication Review: reviewed and adjusted    Assessment/Plan       Upper GI bleed    Hyperlipidemia    Hypertension    Uremic encephalopathy    Acute kidney injury (CMS/HCC)    Acute pancreatitis    Hemorrhagic shock (CMS/HCC)    Thrombocytopenia (CMS/HCC)    Type 2 diabetes mellitus with hyperglycemia (CMS/HCC)    Dementia without behavioral disturbance (CMS/HCC)    Acute posthemorrhagic anemia          Plan:   (GIB/Hemorrhagic Shock: required transfusion and Kcentra initially. Duodenal ulcer and erosive gastritis noted on EGD. Anticoagulation held. Also had oral component of blood loss due to erosions of oral mucosa. GI, Surgery, and Oncology evaluated. All have recommended palliative care given her poor  prognosis and multiorgan failure. Hgb down a little at 9.1 today. Is on oral  Protonix now.      Subacute left MCA CVA with aphasia: Subacute based on CT. Neurology evaluated. She is not AC or antiplatelet candidate at this time. She had this CVA  even while she was on Eliquis and aspirin prior to admission. Aphasic speech persists. RUE weak. SLP has started modified diet based on their eval 4/6. She is taking po better now after I explained to her that if she couldn't/wouldn't eat enough to support life then a PEG tube would be the only option. Both she and her  have confirmed they are not interested in PEG tube. Calorie count in progress. She ate quite a bit for breakfast this AM, and is much improved from mental status standpoint today.     Dementia with metabolic encephalopathy: improving     Leukocytosis: resolved. No antibiotics indicated. Infectious Disease has evaluated.     AFib: on both oral Cardizem and Bystolic and is in NSR. Rate better this AM after decreasing dose of Cardizem (changed to Q8h) on 4/14.     HTN: is off Cardene gtt--BP still running a little high. Continue PO Cardizem and Bystolic. PO Hydralazine added per Renal.     TCP: resolved     KIERAN: HD last on 4/6. Shiley now removed. Creatinine 0.87 today, stable.    Hypernatremia: Na+ normalized      Hypokalemia: low today. Defer to Nephrology. Oral replacement ordered     DM2: continue SSI, sugars up with increased po intake, increased SSI dose algorithm.      Disposition: Poor longterm prognosis. Continue hernandez for comfort while awaiting family decision regarding goals of care. If she can take in sufficient calories by mouth then she may be more appropriate for LTC. Out of restraints now.  Was able to speak to  on phone today. He would like to tentatively plan on pt returning to her SNF for ongoing care.).       Ajit Elder MD  04/15/20  12:00    Time: 35min

## 2020-04-15 NOTE — NURSING NOTE
This morning, pt ate vanilla ice cream cup x 1, 1 cup pudding.  Then on her breakfast tray, ate 1/2 pudding cup and 1/2 pureed eggs with NTL without issue and said she was full.  Documented on calorie envelope.

## 2020-04-15 NOTE — PROGRESS NOTES
Adult Nutrition  Assessment/PES    Patient Name:  Darby Workman  YOB: 1945  MRN: [id number]  Admit Date:  3/27/2020    Assessment Date:  4/15/2020    Comments:  Nutrition follow up.  Calorie count day 3 results - 160 kcal (12% estimated needs), 2 grams protein (3% estimated needs).  Refused both lunch and dinner yesterday per RN.    RN reports patient is eating much better today.  Ate 50% at breakfast and 75% at lunch + ice cream and 1/2 pudding cup.    Plan is now for patient to return to SNF at D/C.    Calorie count has concluded.    Due to the COVID pandemic, nutrition assessment completed based on review of electronic medical record and discussion with RN. This RD currently working remotely and can be reached at [phone], via secure chat or email.     RD will continue to monitor.     Reason for Assessment     Row Name 04/15/20 1604          Reason for Assessment    Reason For Assessment  follow-up protocol;calorie count order         Nutrition/Diet History     Row Name 04/15/20 1605          Nutrition/Diet History    Typical Food/Fluid Intake  RN reports PO intake much improved today         Anthropometrics     Row Name 04/15/20 1605          Admit Weight    Admit Weight  -- 125# 4/15        Body Mass Index (BMI)    BMI Assessment  BMI 18.5-24.9: normal 20.83         Labs/Tests/Procedures/Meds     Row Name 04/15/20 1608          Labs/Procedures/Meds    Lab Results Reviewed  reviewed, pertinent     Lab Results Comments  Gluc, K, Ca, Alb, Phos, Hgb, Hct        Diagnostic Tests/Procedures    Diagnostic Test/Procedure Reviewed  reviewed, pertinent        Medications    Pertinent Medications Reviewed  reviewed, pertinent     Pertinent Medications Comments  humalog, MgSO4, protonix, demadex         Physical Findings     Row Name 04/15/20 1609          Physical Findings    Oral/Mouth Cavity  other (see comments) oral ulcers on lips     Skin  -- B=13, bruised         Nutrition Prescription Ordered      Row Name 04/15/20 1609          Nutrition Prescription PO    Current PO Diet  Dysphagia     Dysphagia Level  3  Pureed with some mashed     Fluid Consistency  Nectar/syrup thick     Supplement  Boost Pudding (Ensure Pudding)     Supplement Frequency  3 times a day     Common Modifiers  Cardiac         Evaluation of Received Nutrient/Fluid Intake     Row Name 04/15/20 1612          Nutrient/Fluid Evaluation    Additional Documentation  Calories Evaluation (Group);Protein Evaluation (Group)        Calories Evaluation    Oral Calories (kcal)  160     % of Kcal Needs  12        Protein Evaluation    Oral Protein (gm)  2     % of Protein Needs  3        Intake Assessment    Energy/Calorie Requirement Assessment  not meeting needs     Protein Requirement Assessment  not meeting needs         Problem/Interventions:    Intervention Goal     Row Name 04/15/20 1615          Intervention Goal    General  Maintain nutrition;Reduce/improve symptoms;Disease management/therapy;Meet nutritional needs for age/condition     PO  Increase intake;PO intake (%)     PO Intake %  75 %     Weight  Maintain weight         Nutrition Intervention     Row Name 04/15/20 1617          Nutrition Intervention    RD/Tech Action  Follow Tx progress;Care plan reviewd;Supplement provided         Education/Evaluation     Row Name 04/15/20 4338          Education    Education  Will Instruct as appropriate        Monitor/Evaluation    Monitor  Per protocol;PO intake;Supplement intake;Pertinent labs;Weight;Skin status;Symptoms           Electronically signed by:  Marialuisa Smith RD  04/15/20 16:15

## 2020-04-15 NOTE — PLAN OF CARE
Patient is showing some signs of improvement with orientation, wakefulness, and eating when fed. Lungs are diminished throughout without coughing or congestion. Her abdomen is soft with +BS in all quadrants, and she is eating soft, pureed foods and thick liquids better. Her mouth that has been an ongoing problem with blistering, bleeding and sores is also showing signs of improvement.

## 2020-04-15 NOTE — THERAPY RE-EVALUATION
Acute Care - Speech Language Pathology   Swallow Re-Evaluation [facility]     Patient Name: Darby Workman  : 1945  MRN: [id number]  Today's Date: 4/15/2020               Admit Date: 3/27/2020    Visit Dx:     ICD-10-CM ICD-9-CM   1. Upper GI bleed K92.2 578.9   2. Acute renal failure, unspecified acute renal failure type (CMS/HCC) N17.9 584.9   3. Elevated LFTs R94.5 790.6   4. Hyperkalemia E87.5 276.7     Patient Active Problem List   Diagnosis   • Hypertensive crisis   • Diabetes mellitus (CMS/[facility])   • Hyperlipidemia   • Hypertension   • Elevated troponin   • Acute cerebrovascular accident (CVA) of cerebellum (CMS/[facility])   • Right-sided headache   • Acute ischemic stroke (CMS/[facility])   • Embolic stroke (CMS/[facility])   • Anticoagulated by anticoagulation treatment   • Stroke (cerebrum) (CMS/[facility])   • Dysthymic disorder   • Hypotension   • Upper GI bleed   • Uremic encephalopathy   • Acute kidney injury (CMS/[facility])   • Acute pancreatitis   • Hemorrhagic shock (CMS/[facility])   • Thrombocytopenia (CMS/[facility])   • Type 2 diabetes mellitus with hyperglycemia (CMS/[facility])   • Dementia without behavioral disturbance (CMS/[facility])   • Acute posthemorrhagic anemia     Past Medical History:   Diagnosis Date   • Acute cerebrovascular accident (CVA) of cerebellum (CMS/[facility])    • Acute ischemic stroke (CMS/[facility])    • Arthritis    • Coronary artery disease    • CVA (cerebral vascular accident) (CMS/[facility])    • Diabetes mellitus (CMS/[facility])    • Heart attack (CMS/[facility])    • History of blood clots    • Hyperlipidemia    • Hypertension    • Vision loss      Past Surgical History:   Procedure Laterality Date   • CAROTID ENDARTERECTOMY Left 2019    Procedure: Left carotid endarterectomy;  Surgeon: Rupert Oliva MD;  Location: [facility] MAIN OR;  Service: Neurosurgery   • EMBOLECTOMY Left 2019    Procedure: CEREBRAL ANGIOGRAM, LEFT INTERNAL CAROTID ARTERY STENT;  Surgeon: Rupert Oliva MD;  Location: [facility] OR ;  Service:  Neurosurgery   • ENDOSCOPY N/A 3/28/2020    Procedure: ESOPHAGOGASTRODUODENOSCOPY AT BEDSIDE WITH EPI INJECTION AND GOLD PROBE CAUTERIZATION;  Surgeon: Malathi Bright MD;  Location: [facility] ENDOSCOPY;  Service: Gastroenterology;  Laterality: N/A;  PRE GI BLEED  POST EROSIVE GASTRITIS, DUODENAL ULCER WITH CLOT        SWALLOW EVALUATION (last 72 hours)      SLP Adult Swallow Evaluation     Row Name 04/15/20 1300                   Rehab Evaluation    Document Type  re-evaluation  -HS        Subjective Information  no complaints  -HS        Patient Observations  alert;cooperative  -HS        Patient Effort  adequate  -HS        Symptoms Noted During/After Treatment  none  -HS           General Information    Patient Profile Reviewed  yes  -HS        Pertinent History Of Current Problem  Admitted with upper GI bleed. Hx of CVA. Clinical swallow evaluation completed on 4/6/2020 recommended pureed diet with nectar thick liquids. Patient's intake has been poor per medical record, although some improvement noted this AM.    -HS        Current Method of Nutrition  pureed;nectar/syrup-thick liquids  -HS        Precautions/Limitations, Vision  WFL;for purposes of eval  -HS        Precautions/Limitations, Hearing  WFL;for purposes of eval  -HS        Prior Level of Function-Communication  cognitive-linguistic impairment  -HS        Prior Level of Function-Swallowing  mechanical soft textures;thin liquids Per clinical swallow evaluation 8/30/19  -HS        Plans/Goals Discussed with  patient  -HS        Barriers to Rehab  cognitive status  -HS        Patient's Goals for Discharge  patient did not state  -HS           Oral Motor and Function    Oral Lesions or Structural Abnormalities and/or variants  Dried, bloody oral and labial secretions/lesions  -HS        Secretion Management  WNL/WFL  -HS        Mucosal Quality  dry  -HS        Volitional Swallow  unable to elicit  -HS        Volitional Cough  unable to elicit  -HS            Oral Musculature and Cranial Nerve Assessment    Oral Motor General Assessment  generalized oral motor weakness  -           General Eating/Swallowing Observations    Eating/Swallowing Skills  fed by SLP  -HS        Positioning During Eating  upright in bed  -HS        Utensils Used  spoon;cup;straw  -HS        Consistencies Trialed  thin liquids;nectar/syrup-thick liquids;pureed;mechanical soft, no mixed consistencies  -           Clinical Swallow Eval    Clinical Swallow Evaluation Summary  Re-evaluated swallow. No initial s/s of aspiration with thin liquids via cup or straw sips of thin liquids. After multiple trials, approximately 3 oz. total intake, patient demonstrated immediate coughing and watery eyes. Suspect aspiration with thin liquids. No overt s/s of aspiration with nectar thick liquids. Slightly prolonged mastication time with overall reduced oral coordination and oral residue on mechanical soft trials.   -HS           Clinical Impression    SLP Swallowing Diagnosis  suspected pharyngeal dysfunction  -HS        Functional Impact  risk of aspiration/pneumonia;risk of malnutrition;risk of dehydration  -        Rehab Potential/Prognosis, Swallowing  adequate, monitor progress closely  -        Swallow Criteria for Skilled Therapeutic Interventions Met  demonstrates skilled criteria  -           Recommendations    Therapy Frequency (Swallow)  PRN  -        Predicted Duration Therapy Intervention (Days)  until discharge  -HS        SLP Diet Recommendation  puree with some mashed;nectar thick liquids;ice chips between meals after oral care, with supervision;water between meals after oral care, with supervision  -        Recommended Diagnostics  reassess via clinical swallow evaluation  -        Recommended Precautions and Strategies  upright posture during/after eating;small bites of food and sips of liquid;no straw  -HS        SLP Rec. for Method of Medication Administration  meds  whole;meds crushed;with pudding or applesauce  -        Monitor for Signs of Aspiration  yes;notify SLP if any concerns  -HS        Anticipated Dischage Disposition  extended care facility;skilled nursing facility  -HS           Swallow Goals (SLP)    Oral Nutrition/Hydration Goal Selection (SLP)  oral nutrition/hydration, SLP goal 1  -HS           Oral Nutrition/Hydration Goal 1 (SLP)    Oral Nutrition/Hydration Goal 1, SLP  Pt will tolerate least restrictive diet  -HS        Time Frame (Oral Nutrition/Hydration Goal 1, SLP)  by discharge  -HS        Barriers (Oral Nutrition/Hydration Goal 1, SLP)  Swallow re-eval completed. Upgrade to puree with some mashed diet. Continue with nectar thick liquids.  -HS        Progress/Outcomes (Oral Nutrition/Hydration Goal 1, SLP)  goal ongoing  -HS          User Key  (r) = Recorded By, (t) = Taken By, (c) = Cosigned By    Initials Name Effective Dates    HS Lupe Rose, MS CCC-SLP 06/08/18 -           EDUCATION  The patient has been educated in the following areas:   Dysphagia (Swallowing Impairment) Oral Care/Hydration.    SLP Recommendation and Plan  SLP Swallowing Diagnosis: suspected pharyngeal dysfunction  SLP Diet Recommendation: puree with some mashed, nectar thick liquids, ice chips between meals after oral care, with supervision, water between meals after oral care, with supervision  Recommended Precautions and Strategies: upright posture during/after eating, small bites of food and sips of liquid, no straw  SLP Rec. for Method of Medication Administration: meds whole, meds crushed, with pudding or applesauce     Monitor for Signs of Aspiration: yes, notify SLP if any concerns  Recommended Diagnostics: reassess via clinical swallow evaluation  Swallow Criteria for Skilled Therapeutic Interventions Met: demonstrates skilled criteria  Anticipated Dischage Disposition: extended care facility, skilled nursing facility  Rehab Potential/Prognosis, Swallowing:  adequate, monitor progress closely  Therapy Frequency (Swallow): PRN  Predicted Duration Therapy Intervention (Days): until discharge       Plan of Care Reviewed With: patient  Outcome Summary: Re-evaluated swallow. Rec: pureed diet with some mashed texture and nectar thick liquids Ice chips or small sips of water via cup sips 30 minutes after meals only after oral care. Meds whole or crushed in pure/pudding. ST to follow for diet tolerance PRN, re-evaluation as indicated.    SLP GOALS     Row Name 04/15/20 1300             Oral Nutrition/Hydration Goal 1 (SLP)    Oral Nutrition/Hydration Goal 1, SLP  Pt will tolerate least restrictive diet  -HS      Time Frame (Oral Nutrition/Hydration Goal 1, SLP)  by discharge  -HS      Barriers (Oral Nutrition/Hydration Goal 1, SLP)  Swallow re-eval completed. Upgrade to puree with some mashed diet. Continue with nectar thick liquids.  -HS      Progress/Outcomes (Oral Nutrition/Hydration Goal 1, SLP)  goal ongoing  -HS        User Key  (r) = Recorded By, (t) = Taken By, (c) = Cosigned By    Initials Name Provider Type    Lupe Newell MS CCC-SLP Speech and Language Pathologist           SLP Outcome Measures (last 72 hours)      SLP Outcome Measures     Row Name 04/15/20 1300             SLP Outcome Measures    Outcome Measure Used?  Adult NOMS  -HS         Adult FCM Scores    FCM Chosen  Swallowing  -HS      Swallowing FCM Score  4  -HS        User Key  (r) = Recorded By, (t) = Taken By, (c) = Cosigned By    Initials Name Effective Dates     Lupe Rose MS CCC-SLP 06/08/18 -            Time Calculation:   Time Calculation- SLP     Row Name 04/15/20 1317             Time Calculation- SLP    SLP Received On  04/15/20  -HS        User Key  (r) = Recorded By, (t) = Taken By, (c) = Cosigned By    Initials Name Provider Type    Lupe Newell MS CCC-SLP Speech and Language Pathologist          Therapy Charges for Today     Code Description Service Date Service  Provider Modifiers Qty    [id number]  ST TREATMENT SWALLOW 3 4/15/2020 Lupe Rose, MS CCC-SLP GN 1               Lupe Rose MS CCC-SLP  4/15/2020

## 2020-04-15 NOTE — PROGRESS NOTES
"   LOS: 19 days    Patient Care Team:  Eric Matta MD as PCP - General (General Practice)    Chief Complaint:    Chief Complaint   Patient presents with   • Altered Mental Status   • Black or Bloody Stool     KIERAN  Subjective     Interval History:   Awakens easily.  Nods to some questions, Tries to answer, bu expressive aphasia.  Eating a little more this am.       Review of Systems:   Unable to obtain reliably    Objective     Vital Signs  Temp:  [97.1 °F (36.2 °C)-98.4 °F (36.9 °C)] 97.1 °F (36.2 °C)  Heart Rate:  [55-63] 61  Resp:  [15-17] 16  BP: (152-192)/(76-82) 192/79    Flowsheet Rows      First Filed Value   Admission Height  165.1 cm (65\") Documented at 03/27/2020 1029   Admission Weight  60.3 kg (133 lb) Documented at 03/27/2020 1029          No intake/output data recorded.  I/O last 3 completed shifts:  In: 2000 [I.V.:2000]  Out: 900 [Urine:900]    Intake/Output Summary (Last 24 hours) at 4/15/2020 0844  Last data filed at 4/15/2020 0545  Gross per 24 hour   Intake --   Output 650 ml   Net -650 ml       Physical Exam:  Elderly WF.  Oral mucosa much less bloody.Lips improved.   Awake;   Neck no jvd  Heart Bradycardic, irregularly irregular, not tachycardic, no s3 or rub  Lungs clear to auscultation, no wheezing. Unlabored on supplemental O2  Abd + bs, soft, no guarding or rebound  Ext 1+ upper and 1+ lower ext edema.     SKin scattered ecchymoses      Results Review:    Results from last 7 days   Lab Units 04/15/20  0659 04/14/20  0644 04/13/20  0523  04/10/20  0613  04/09/20  0552   SODIUM mmol/L 141 143 152*   < > 147*  --  146*   POTASSIUM mmol/L 3.3* 3.8 3.8   < > 3.1*   < > 3.2*   CHLORIDE mmol/L 106 108* 117*   < > 107  --  107   CO2 mmol/L 27.3 26.6 27.2   < > 25.6  --  23.7   BUN mg/dL 17 19 26*   < > 36*  --  35*   CREATININE mg/dL 0.87 0.86 0.98   < > 1.05*  --  1.21*   CALCIUM mg/dL 8.4* 8.4* 8.7   < > 9.0  --  8.5*   BILIRUBIN mg/dL  --   --   --   --  1.5*  --  1.5*   ALK PHOS U/L  --   --   " --   --  282*  --  297*   ALT (SGPT) U/L  --   --   --   --  26  --  24   AST (SGOT) U/L  --   --   --   --  23  --  27   GLUCOSE mg/dL 201* 250* 293*   < > 203*  --  188*    < > = values in this interval not displayed.       Estimated Creatinine Clearance: 49.4 mL/min (by C-G formula based on SCr of 0.87 mg/dL).    Results from last 7 days   Lab Units 04/15/20  0659 04/14/20  0644 04/13/20  0523   MAGNESIUM mg/dL 1.6 1.7 2.0   PHOSPHORUS mg/dL 2.4* 2.1* 2.2*       Results from last 7 days   Lab Units 04/14/20  0644   URIC ACID mg/dL 5.1       Results from last 7 days   Lab Units 04/15/20  0659 04/14/20  0816 04/13/20  0523 04/12/20  0706 04/10/20  0613   WBC 10*3/mm3 3.67 4.73 6.32 5.71 7.16   HEMOGLOBIN g/dL 9.1* 9.6* 10.7* 10.8* 9.7*   PLATELETS 10*3/mm3 215 216 254 276 258               Imaging Results (Last 24 Hours)     ** No results found for the last 24 hours. **          dilTIAZem 60 mg Oral Q8H   hydrALAZINE 25 mg Oral Q8H   insulin lispro 0-9 Units Subcutaneous 4x Daily With Meals & Nightly   magnesium sulfate 2 g Intravenous Once   nebivolol 20 mg Oral Q24H   pantoprazole 40 mg Oral BID AC   potassium chloride 40 mEq Oral Once       dilTIAZem 5-15 mg/hr Last Rate: Stopped (04/12/20 1752)       Medication Review:   Current Facility-Administered Medications   Medication Dose Route Frequency Provider Last Rate Last Dose   • acetaminophen (TYLENOL) tablet 650 mg  650 mg Oral Q4H PRN Boogie Saavedra MD        Or   • acetaminophen (TYLENOL) suppository 650 mg  650 mg Rectal Q4H PRN Boogie Saavedra MD       • dextrose (D50W) 25 g/ 50mL Intravenous Solution 25 g  25 g Intravenous Q15 Min PRN Boogie Saavedra MD   25 g at 03/30/20 0033   • dextrose (GLUTOSE) oral gel 15 g  15 g Oral Q15 Min PRN Boogie Saavedra MD       • dilTIAZem (CARDIZEM) 100 mg in 100 mL NS (1 mg/mL) infusion  5-15 mg/hr Intravenous Titrated Lucy Marinelli, APRN   Stopped at 04/12/20 6094   • dilTIAZem (CARDIZEM) tablet 60 mg  60 mg Oral  Q8H Ajit Elder MD   60 mg at 04/15/20 0700   • glucagon (human recombinant) (GLUCAGEN DIAGNOSTIC) injection 1 mg  1 mg Subcutaneous Q15 Min PRN Boogie Saavedra MD       • hydrALAZINE (APRESOLINE) injection 20 mg  20 mg Intravenous Q6H PRN Chandana Kapoor MD   20 mg at 04/13/20 0343   • hydrALAZINE (APRESOLINE) tablet 25 mg  25 mg Oral Q8H Emily Davis MD       • insulin lispro (humaLOG) injection 0-9 Units  0-9 Units Subcutaneous 4x Daily With Meals & Nightly Ajit Elder MD   2 Units at 04/15/20 0806   • ipratropium-albuterol (DUO-NEB) nebulizer solution 3 mL  3 mL Nebulization Q2H PRN Boogie Saavedra MD       • magic mouthwash oral supsension 15 mL  15 mL Swish & Spit Q4H PRN Mario Saavedra MD   15 mL at 04/01/20 2022   • magnesium sulfate in D5W 1g/100mL (PREMIX)  2 g Intravenous Once Chandana Hook MD       • metoprolol tartrate (LOPRESSOR) injection 5 mg  5 mg Intravenous Q6H PRN Roni Gutierrez DO       • nebivolol (BYSTOLIC) tablet 20 mg  20 mg Oral Q24H Emily Davis MD   20 mg at 04/15/20 0806   • ondansetron (ZOFRAN) tablet 4 mg  4 mg Oral Q6H PRN Boogie Saavedra MD        Or   • ondansetron (ZOFRAN) injection 4 mg  4 mg Intravenous Q6H PRN Boogie Saavedra MD       • pantoprazole (PROTONIX) EC tablet 40 mg  40 mg Oral BID AC Chandana Kapoor MD   40 mg at 04/15/20 0814   • potassium chloride (KLOR-CON) packet 40 mEq  40 mEq Oral Once Emily Davis MD           Assessment/Plan   1. KIERAN, non-oliguric, improving.  HD last on 4.6.20, and HD catheter removed 4.10.20.  Significant hypernatremia due to insufficient water intake.  Sodium better with IVF, but concerned that she will not be able to maintain enough water intake po.    2. GI bleed, hemorrhagic shock.  Duodenal ulcer, erosive gastritis on EGD. Hg down just a little.   3. Anemia of blood loss, GIB, oral mucosal bleeding.  4. Left subacute left MCA CVA.  Expressive Aphasia .  5. Dementia  6.  HTN. Not controlled. Increase hydralazine.  No room to go up on Bystolic since bradycardic.   7. DM2  8. Nutrition. Minimal po intake . Albumin 2.6.    9. PAF    Plan:  1.  Po diuretic  2. Replace K po.  3. Increase hydralazine to 25 mg TID.      Emily Davis MD  04/15/20  08:44

## 2020-04-15 NOTE — PLAN OF CARE
Problem: Patient Care Overview  Goal: Plan of Care Review  Flowsheets (Taken 4/15/2020 1119)  Plan of Care Reviewed With: patient  Outcome Summary: Re-evaluated swallow. Rec: pureed diet with some mashed texture and nectar thick liquids Ice chips or small sips of water via cup sips 30 minutes after meals only after oral care. Meds whole or crushed in pure/pudding. ST to follow for diet tolerance PRN, re-evaluation as indicated.

## 2020-04-16 LAB
ALBUMIN SERPL-MCNC: 2.2 G/DL (ref 3.5–5.2)
ANION GAP SERPL CALCULATED.3IONS-SCNC: 9 MMOL/L (ref 5–15)
BASOPHILS # BLD AUTO: 0.01 10*3/MM3 (ref 0–0.2)
BASOPHILS NFR BLD AUTO: 0.2 % (ref 0–1.5)
BUN BLD-MCNC: 20 MG/DL (ref 8–23)
BUN/CREAT SERPL: 21.7 (ref 7–25)
CALCIUM SPEC-SCNC: 8.4 MG/DL (ref 8.6–10.5)
CHLORIDE SERPL-SCNC: 108 MMOL/L (ref 98–107)
CO2 SERPL-SCNC: 27 MMOL/L (ref 22–29)
CREAT BLD-MCNC: 0.92 MG/DL (ref 0.57–1)
DEPRECATED RDW RBC AUTO: 47 FL (ref 37–54)
EOSINOPHIL # BLD AUTO: 0.13 10*3/MM3 (ref 0–0.4)
EOSINOPHIL NFR BLD AUTO: 2.7 % (ref 0.3–6.2)
ERYTHROCYTE [DISTWIDTH] IN BLOOD BY AUTOMATED COUNT: 15.1 % (ref 12.3–15.4)
GFR SERPL CREATININE-BSD FRML MDRD: 59 ML/MIN/1.73
GLUCOSE BLD-MCNC: 152 MG/DL (ref 65–99)
GLUCOSE BLDC GLUCOMTR-MCNC: 108 MG/DL (ref 70–130)
GLUCOSE BLDC GLUCOMTR-MCNC: 190 MG/DL (ref 70–130)
GLUCOSE BLDC GLUCOMTR-MCNC: 236 MG/DL (ref 70–130)
GLUCOSE BLDC GLUCOMTR-MCNC: 309 MG/DL (ref 70–130)
HCT VFR BLD AUTO: 27.6 % (ref 34–46.6)
HGB BLD-MCNC: 8.9 G/DL (ref 12–15.9)
IMM GRANULOCYTES # BLD AUTO: 0.04 10*3/MM3 (ref 0–0.05)
IMM GRANULOCYTES NFR BLD AUTO: 0.8 % (ref 0–0.5)
LYMPHOCYTES # BLD AUTO: 0.78 10*3/MM3 (ref 0.7–3.1)
LYMPHOCYTES NFR BLD AUTO: 16.2 % (ref 19.6–45.3)
MAGNESIUM SERPL-MCNC: 1.6 MG/DL (ref 1.6–2.4)
MCH RBC QN AUTO: 28.4 PG (ref 26.6–33)
MCHC RBC AUTO-ENTMCNC: 32.2 G/DL (ref 31.5–35.7)
MCV RBC AUTO: 88.2 FL (ref 79–97)
MONOCYTES # BLD AUTO: 0.33 10*3/MM3 (ref 0.1–0.9)
MONOCYTES NFR BLD AUTO: 6.9 % (ref 5–12)
NEUTROPHILS # BLD AUTO: 3.52 10*3/MM3 (ref 1.7–7)
NEUTROPHILS NFR BLD AUTO: 73.2 % (ref 42.7–76)
NRBC BLD AUTO-RTO: 0 /100 WBC (ref 0–0.2)
PHOSPHATE SERPL-MCNC: 2.1 MG/DL (ref 2.5–4.5)
PHOSPHATE SERPL-MCNC: 2.6 MG/DL (ref 2.5–4.5)
PLATELET # BLD AUTO: 218 10*3/MM3 (ref 140–450)
PMV BLD AUTO: 10.9 FL (ref 6–12)
POTASSIUM BLD-SCNC: 3.7 MMOL/L (ref 3.5–5.2)
RBC # BLD AUTO: 3.13 10*6/MM3 (ref 3.77–5.28)
SODIUM BLD-SCNC: 144 MMOL/L (ref 136–145)
WBC NRBC COR # BLD: 4.81 10*3/MM3 (ref 3.4–10.8)

## 2020-04-16 PROCEDURE — 97535 SELF CARE MNGMENT TRAINING: CPT | Performed by: OCCUPATIONAL THERAPIST

## 2020-04-16 PROCEDURE — 25010000002 MAGNESIUM SULFATE IN D5W 1G/100ML (PREMIX) 1-5 GM/100ML-% SOLUTION: Performed by: HOSPITALIST

## 2020-04-16 PROCEDURE — 83735 ASSAY OF MAGNESIUM: CPT | Performed by: NURSE PRACTITIONER

## 2020-04-16 PROCEDURE — 80069 RENAL FUNCTION PANEL: CPT | Performed by: HOSPITALIST

## 2020-04-16 PROCEDURE — 82962 GLUCOSE BLOOD TEST: CPT

## 2020-04-16 PROCEDURE — 97166 OT EVAL MOD COMPLEX 45 MIN: CPT | Performed by: OCCUPATIONAL THERAPIST

## 2020-04-16 PROCEDURE — 84100 ASSAY OF PHOSPHORUS: CPT | Performed by: HOSPITALIST

## 2020-04-16 PROCEDURE — 63710000001 INSULIN LISPRO (HUMAN) PER 5 UNITS: Performed by: HOSPITALIST

## 2020-04-16 PROCEDURE — 85025 COMPLETE CBC W/AUTO DIFF WBC: CPT | Performed by: INTERNAL MEDICINE

## 2020-04-16 RX ORDER — MAGNESIUM SULFATE 1 G/100ML
2 INJECTION INTRAVENOUS ONCE
Status: COMPLETED | OUTPATIENT
Start: 2020-04-16 | End: 2020-04-16

## 2020-04-16 RX ORDER — TORSEMIDE 20 MG/1
40 TABLET ORAL DAILY
Status: DISCONTINUED | OUTPATIENT
Start: 2020-04-16 | End: 2020-04-20 | Stop reason: HOSPADM

## 2020-04-16 RX ADMIN — DILTIAZEM HYDROCHLORIDE 60 MG: 60 TABLET, FILM COATED ORAL at 06:35

## 2020-04-16 RX ADMIN — HYDRALAZINE HYDROCHLORIDE 25 MG: 25 TABLET, FILM COATED ORAL at 06:36

## 2020-04-16 RX ADMIN — MAGNESIUM SULFATE 2 G: 1 INJECTION INTRAVENOUS at 12:59

## 2020-04-16 RX ADMIN — DILTIAZEM HYDROCHLORIDE 60 MG: 60 TABLET, FILM COATED ORAL at 21:17

## 2020-04-16 RX ADMIN — INSULIN LISPRO 2 UNITS: 100 INJECTION, SOLUTION INTRAVENOUS; SUBCUTANEOUS at 09:03

## 2020-04-16 RX ADMIN — ACETAMINOPHEN 650 MG: 325 TABLET, FILM COATED ORAL at 06:35

## 2020-04-16 RX ADMIN — NEBIVOLOL HYDROCHLORIDE 20 MG: 10 TABLET ORAL at 09:03

## 2020-04-16 RX ADMIN — INSULIN LISPRO 7 UNITS: 100 INJECTION, SOLUTION INTRAVENOUS; SUBCUTANEOUS at 12:59

## 2020-04-16 RX ADMIN — TORSEMIDE 40 MG: 20 TABLET ORAL at 09:03

## 2020-04-16 RX ADMIN — HYDRALAZINE HYDROCHLORIDE 25 MG: 25 TABLET, FILM COATED ORAL at 14:27

## 2020-04-16 RX ADMIN — POTASSIUM & SODIUM PHOSPHATES POWDER PACK 280-160-250 MG 2 PACKET: 280-160-250 PACK at 14:35

## 2020-04-16 RX ADMIN — PANTOPRAZOLE SODIUM 40 MG: 40 TABLET, DELAYED RELEASE ORAL at 06:35

## 2020-04-16 RX ADMIN — INSULIN LISPRO 4 UNITS: 100 INJECTION, SOLUTION INTRAVENOUS; SUBCUTANEOUS at 21:17

## 2020-04-16 RX ADMIN — DILTIAZEM HYDROCHLORIDE 60 MG: 60 TABLET, FILM COATED ORAL at 14:27

## 2020-04-16 RX ADMIN — HYDRALAZINE HYDROCHLORIDE 25 MG: 25 TABLET, FILM COATED ORAL at 22:23

## 2020-04-16 RX ADMIN — PANTOPRAZOLE SODIUM 40 MG: 40 TABLET, DELAYED RELEASE ORAL at 18:10

## 2020-04-16 NOTE — SIGNIFICANT NOTE
04/16/20 1430   Rehab Treatment   Discipline physical therapist;other (see comments)  (pt refused, head nodding No. pt didnt verbalize. pt apparently just performed bed mobility w OT and fatigued. will follow up.)   Recommendation   PT - Next Appointment 04/17/20

## 2020-04-16 NOTE — PLAN OF CARE
Pt has eaten all of her meals today.  Taken all medication with soften ice cream.  Mag IV and Phos given.  OT and PT evaluated pt.  Not much effort on pts behalf.  VSS.  Will cont to monitor.    Problem: Fall Risk (Adult)  Goal: Absence of Fall  Outcome: Ongoing (interventions implemented as appropriate)     Problem: Restraint, Nonbehavioral (Nonviolent)  Goal: Rationale and Justification  Outcome: Ongoing (interventions implemented as appropriate)  Goal: Nonbehavioral (Nonviolent) Restraint: Absence of Injury/Harm  Outcome: Ongoing (interventions implemented as appropriate)  Goal: Nonbehavioral (Nonviolent) Restraint: Achievement of Discontinuation Criteria  Outcome: Ongoing (interventions implemented as appropriate)  Goal: Nonbehavioral (Nonviolent) Restraint: Preservation of Dignity and Wellbeing  Outcome: Ongoing (interventions implemented as appropriate)     Problem: Renal Failure/Kidney Injury, Acute (Adult)  Goal: Signs and Symptoms of Listed Potential Problems Will be Absent, Minimized or Managed (Renal Failure/Kidney Injury, Acute)  Outcome: Ongoing (interventions implemented as appropriate)     Problem: Pain, Acute (Adult)  Goal: Acceptable Pain Control/Comfort Level  Outcome: Ongoing (interventions implemented as appropriate)     Problem: Gastrointestinal Bleeding (Adult)  Goal: Signs and Symptoms of Listed Potential Problems Will be Absent, Minimized or Managed (Gastrointestinal Bleeding)  Outcome: Ongoing (interventions implemented as appropriate)     Problem: Skin Injury Risk (Adult)  Goal: Skin Health and Integrity  Outcome: Ongoing (interventions implemented as appropriate)     Problem: Patient Care Overview  Goal: Plan of Care Review  Outcome: Ongoing (interventions implemented as appropriate)  Goal: Individualization and Mutuality  Outcome: Ongoing (interventions implemented as appropriate)  Goal: Discharge Needs Assessment  Outcome: Ongoing (interventions implemented as appropriate)  Goal:  Interprofessional Rounds/Family Conf  Outcome: Ongoing (interventions implemented as appropriate)

## 2020-04-16 NOTE — PLAN OF CARE
Patient is more alert, attempting to give short answers to short/yes/no questions. She is eating better and holding onto her cup to take drinks on her own now! Oral care is done throughout the shift to keep her mouth lubricated well so that it doesn't dry and crack intact lesions/blisters. Lungs are clear to diminished in the bases without coughing noted at this time. Abdomen is flat, soft, with + BS in all quadrants and she still has her F/C to BSD with clear, yellow, urine draining. She still has edema to bilateral arms and it is difficult to obtain labs or peripheral IV line for her. Bilateral SCDs are on and all pulses are palpable.

## 2020-04-16 NOTE — PROGRESS NOTES
"   LOS: 20 days    Patient Care Team:  Eric Matta MD as PCP - General (General Practice)    Chief Complaint:    Chief Complaint   Patient presents with   • Altered Mental Status   • Black or Bloody Stool     KIERAN  Subjective     Interval History:   Awakens easily.  Sitting up in bed.  Ate 100% of breakfast.  Tries to answer some questions, but unable.      Review of Systems:   Unable to obtain reliably    Objective     Vital Signs  Temp:  [97.6 °F (36.4 °C)-98.6 °F (37 °C)] 98.4 °F (36.9 °C)  Heart Rate:  [56-74] 60  Resp:  [16-18] 16  BP: (118-152)/(53-66) 118/53    Flowsheet Rows      First Filed Value   Admission Height  165.1 cm (65\") Documented at 03/27/2020 1029   Admission Weight  60.3 kg (133 lb) Documented at 03/27/2020 1029          I/O this shift:  In: 120 [P.O.:120]  Out: -   I/O last 3 completed shifts:  In: 300 [P.O.:300]  Out: 900 [Urine:900]    Intake/Output Summary (Last 24 hours) at 4/16/2020 1057  Last data filed at 4/16/2020 0815  Gross per 24 hour   Intake 300 ml   Output 600 ml   Net -300 ml       Physical Exam:  Elderly WF.  Oral mucosa much less bloody.Lips improved. Sitting up,  More interactive.   Awake;   Neck no jvd  Heart Bradycardic, irregularly irregular, not tachycardic, no s3 or rub  Lungs clear to auscultation, no wheezing. Unlabored on supplemental O2  Abd + bs, soft, no guarding or rebound  Ext 1+ upper and 1+ lower ext edema.     SKin scattered ecchymoses      Results Review:    Results from last 7 days   Lab Units 04/16/20  0452 04/15/20  0659 04/14/20  0644  04/10/20  0613   SODIUM mmol/L 144 141 143   < > 147*   POTASSIUM mmol/L 3.7 3.3* 3.8   < > 3.1*   CHLORIDE mmol/L 108* 106 108*   < > 107   CO2 mmol/L 27.0 27.3 26.6   < > 25.6   BUN mg/dL 20 17 19   < > 36*   CREATININE mg/dL 0.92 0.87 0.86   < > 1.05*   CALCIUM mg/dL 8.4* 8.4* 8.4*   < > 9.0   BILIRUBIN mg/dL  --   --   --   --  1.5*   ALK PHOS U/L  --   --   --   --  282*   ALT (SGPT) U/L  --   --   --   --  26   AST " (SGOT) U/L  --   --   --   --  23   GLUCOSE mg/dL 152* 201* 250*   < > 203*    < > = values in this interval not displayed.       Estimated Creatinine Clearance: 48.5 mL/min (by C-G formula based on SCr of 0.92 mg/dL).    Results from last 7 days   Lab Units 04/16/20  0452 04/15/20  0659 04/14/20  0644   MAGNESIUM mg/dL 1.6 1.6 1.7   PHOSPHORUS mg/dL 2.1* 2.4* 2.1*       Results from last 7 days   Lab Units 04/14/20  0644   URIC ACID mg/dL 5.1       Results from last 7 days   Lab Units 04/16/20  0452 04/15/20  0659 04/14/20  0816 04/13/20  0523 04/12/20  0706   WBC 10*3/mm3 4.81 3.67 4.73 6.32 5.71   HEMOGLOBIN g/dL 8.9* 9.1* 9.6* 10.7* 10.8*   PLATELETS 10*3/mm3 218 215 216 254 276               Imaging Results (Last 24 Hours)     ** No results found for the last 24 hours. **          dilTIAZem 60 mg Oral Q8H   hydrALAZINE 25 mg Oral Q8H   insulin lispro 0-9 Units Subcutaneous 4x Daily With Meals & Nightly   magnesium sulfate 2 g Intravenous Once   nebivolol 20 mg Oral Q24H   pantoprazole 40 mg Oral BID AC   torsemide 40 mg Oral Daily          Medication Review:   Current Facility-Administered Medications   Medication Dose Route Frequency Provider Last Rate Last Dose   • acetaminophen (TYLENOL) tablet 650 mg  650 mg Oral Q4H PRN Boogie Saavedra MD   650 mg at 04/16/20 0635    Or   • acetaminophen (TYLENOL) suppository 650 mg  650 mg Rectal Q4H PRN Boogie Saavedra MD       • dextrose (D50W) 25 g/ 50mL Intravenous Solution 25 g  25 g Intravenous Q15 Min PRN Boogie Saavedra MD   25 g at 03/30/20 0033   • dextrose (GLUTOSE) oral gel 15 g  15 g Oral Q15 Min PRN Boogie Saavedra MD       • dilTIAZem (CARDIZEM) tablet 60 mg  60 mg Oral Q8H Ajit Elder MD   60 mg at 04/16/20 0635   • glucagon (human recombinant) (GLUCAGEN DIAGNOSTIC) injection 1 mg  1 mg Subcutaneous Q15 Min PRN Boogie Saavedra MD       • hydrALAZINE (APRESOLINE) injection 20 mg  20 mg Intravenous Q6H PRN Chandana Kapoor MD   20 mg at 04/13/20  0343   • hydrALAZINE (APRESOLINE) tablet 25 mg  25 mg Oral Q8H Emily Davis MD   25 mg at 04/16/20 0636   • insulin lispro (humaLOG) injection 0-9 Units  0-9 Units Subcutaneous 4x Daily With Meals & Nightly Ajit Elder MD   2 Units at 04/16/20 0903   • ipratropium-albuterol (DUO-NEB) nebulizer solution 3 mL  3 mL Nebulization Q2H PRN Boogie Saavedra MD       • magic mouthwash oral supsension 15 mL  15 mL Swish & Spit Q4H PRN Mario Saavedra MD   15 mL at 04/01/20 2022   • magnesium sulfate in D5W 1g/100mL (PREMIX)  2 g Intravenous Once Ajit Elder MD       • metoprolol tartrate (LOPRESSOR) injection 5 mg  5 mg Intravenous Q6H PRN Roni Gutierrez,        • nebivolol (BYSTOLIC) tablet 20 mg  20 mg Oral Q24H Emily Davis MD   20 mg at 04/16/20 0903   • ondansetron (ZOFRAN) tablet 4 mg  4 mg Oral Q6H PRN Boogie Saavedra MD        Or   • ondansetron (ZOFRAN) injection 4 mg  4 mg Intravenous Q6H PRN Boogie Saavedra MD       • pantoprazole (PROTONIX) EC tablet 40 mg  40 mg Oral BID AC Chandana Kapoor MD   40 mg at 04/16/20 0635   • torsemide (DEMADEX) tablet 40 mg  40 mg Oral Daily Emily Davis MD   40 mg at 04/16/20 0903       Assessment/Plan   1. KIERAN, non-oliguric, improving.  HD last on 4.6.20, and HD catheter removed 4.10.20.  Significant hypernatremia due to insufficient water intake.  Sodium now better.  So far maintaning and po intake better.   2. GI bleed, hemorrhagic shock.  Duodenal ulcer, erosive gastritis on EGD. Hg down just a little.   3. Anemia of blood loss, GIB, oral mucosal bleeding.  4. Left subacute left MCA CVA.  Expressive Aphasia .  5. Dementia  6. HTN.   controlled. Increased hydralazine yesterday.  No room to go up on Bystolic since bradycardic.   7. DM2  8. Nutrition. improving po intake . Albumin 2.2.  Replace Phos .  9. PAF    Plan:  1.  Po phos replacement per protocol.   2. PT, OT eval  3. Will sign off.  Please call with questions.    Emily  Eneida Davis MD  04/16/20  10:57

## 2020-04-16 NOTE — PLAN OF CARE
Problem: Patient Care Overview  Goal: Plan of Care Review  Flowsheets (Taken 4/16/2020 1411)  Outcome Summary: Pt seen for OT eval this date, known to this therapist from previous [facility] stay. Pt continues to be aphasic and follows little commands this date but does sit up EOB max A and grimaces in pain throughout. Pt needs max A for LB ADLs as well as simple grooming. She has some R sided weakness with spasticity noted as well as edema. She really follows limited commands for funcitonal tasks but does appear weak and with poor endurance, OT will see while in acute care to attempt improvements in deficits.

## 2020-04-16 NOTE — THERAPY EVALUATION
Acute Care - Occupational Therapy Initial Evaluation  [facility]     Patient Name: Darby Workman  : 1945  MRN: [id number]  Today's Date: 2020             Admit Date: 3/27/2020       ICD-10-CM ICD-9-CM   1. Upper GI bleed K92.2 578.9   2. Acute renal failure, unspecified acute renal failure type (CMS/HCC) N17.9 584.9   3. Elevated LFTs R94.5 790.6   4. Hyperkalemia E87.5 276.7     Patient Active Problem List   Diagnosis   • Hypertensive crisis   • Diabetes mellitus (CMS/[facility])   • Hyperlipidemia   • Hypertension   • Elevated troponin   • Acute cerebrovascular accident (CVA) of cerebellum (CMS/[facility])   • Right-sided headache   • Acute ischemic stroke (CMS/[facility])   • Embolic stroke (CMS/[facility])   • Anticoagulated by anticoagulation treatment   • Stroke (cerebrum) (CMS/[facility])   • Dysthymic disorder   • Hypotension   • Upper GI bleed   • Uremic encephalopathy   • Acute kidney injury (CMS/[facility])   • Acute pancreatitis   • Hemorrhagic shock (CMS/[facility])   • Thrombocytopenia (CMS/[facility])   • Type 2 diabetes mellitus with hyperglycemia (CMS/[facility])   • Dementia without behavioral disturbance (CMS/[facility])   • Acute posthemorrhagic anemia     Past Medical History:   Diagnosis Date   • Acute cerebrovascular accident (CVA) of cerebellum (CMS/[facility])    • Acute ischemic stroke (CMS/[facility])    • Arthritis    • Coronary artery disease    • CVA (cerebral vascular accident) (CMS/[facility])    • Diabetes mellitus (CMS/[facility])    • Heart attack (CMS/[facility])    • History of blood clots    • Hyperlipidemia    • Hypertension    • Vision loss      Past Surgical History:   Procedure Laterality Date   • CAROTID ENDARTERECTOMY Left 2019    Procedure: Left carotid endarterectomy;  Surgeon: Rupert Oliva MD;  Location: [facility] OR;  Service: Neurosurgery   • EMBOLECTOMY Left 2019    Procedure: CEREBRAL ANGIOGRAM, LEFT INTERNAL CAROTID ARTERY STENT;  Surgeon: Rupert Oliva MD;  Location: [facility] OR ;  Service: Neurosurgery   •  ENDOSCOPY N/A 3/28/2020    Procedure: ESOPHAGOGASTRODUODENOSCOPY AT BEDSIDE WITH EPI INJECTION AND GOLD PROBE CAUTERIZATION;  Surgeon: Malathi Bright MD;  Location: [facility] ENDOSCOPY;  Service: Gastroenterology;  Laterality: N/A;  PRE GI BLEED  POST EROSIVE GASTRITIS, DUODENAL ULCER WITH CLOT          OT ASSESSMENT FLOWSHEET (last 12 hours)      Occupational Therapy Evaluation     Row Name 04/16/20 1411                   OT Evaluation Time/Intention    Subjective Information  no complaints  -SG        Document Type  evaluation  -SG        Mode of Treatment  individual therapy;occupational therapy  -SG        Patient Effort  fair  -SG        Symptoms Noted During/After Treatment  increased pain  -SG           General Information    Patient Profile Reviewed?  yes  -SG        Patient Observations  agree to therapy  -SG        General Observations of Patient  Pt supine in bed  -SG        Prior Level of Function  -- Unsure of level of assist, pt aphasic at baseline  -SG        Equipment Currently Used at Home  none  -SG        Pertinent History of Current Functional Problem  GI bleed, hx multiple CVAs  -SG        Existing Precautions/Restrictions  fall  -SG        Limitations/Impairments  safety/cognitive  -SG        Barriers to Rehab  medically complex;previous functional deficit;cognitive status  -SG           Relationship/Environment    Lives With  facility resident  -SG           Cognitive Assessment/Intervention- PT/OT    Orientation Status (Cognition)  unable/difficult to assess  -SG        Follows Commands (Cognition)  follows one step commands;0-24% accuracy  -SG        Safety Deficit (Cognitive)  moderate deficit;insight into deficits/self awareness  -SG        Cognitive Assessment/Intervention Comment  Pt aphasic, follows very limited commands  -SG           Safety Issues, Functional Mobility    Safety Issues Affecting Function (Mobility)  ability to follow commands;insight into deficits/self  awareness;problem solving;safety precaution awareness  -SG        Impairments Affecting Function (Mobility)  balance;motor control;motor planning;grasp;postural/trunk control;strength  -SG           Bed Mobility Assessment/Treatment    Bed Mobility Assessment/Treatment  supine-sit;sit-supine  -SG        Supine-Sit [place] (Bed Mobility)  moderate assist (50% patient effort);verbal cues;nonverbal cues (demo/gesture)  -SG        Sit-Supine [place] (Bed Mobility)  maximum assist (25% patient effort);verbal cues;nonverbal cues (demo/gesture)  -SG           Transfer Assessment/Treatment    Comment (Transfers)  Unable to stand, attempted multiple times but pt grimacing in pain  -SG           ADL Assessment/Intervention    BADL Assessment/Intervention  lower body dressing;grooming  -           Lower Body Dressing Assessment/Training    Lower Body Dressing [place] Level  doff;don;socks;maximum assist (25% patient effort);verbal cues;nonverbal cues (demo/gesture)  -SG        Lower Body Dressing Position  edge of bed sitting;supported sitting  -SG           Grooming Assessment/Training    [place] Level (Grooming)  grooming skills;hair care, combing/brushing;maximum assist (25% patient effort);verbal cues;nonverbal cues (demo/gesture)  -SG        Assistive Devices (Grooming)  hand over hand  -SG        Grooming Position  edge of bed sitting;supported sitting  -SG           General ROM    GENERAL ROM COMMENTS  Doesnt follow commands for ROM or MMT but appears to have weakness on the R side with some spasticity, she grimaces in pain with RUE mvt  -           Motor Assessment/Interventions    Additional Documentation  Balance (Group)  -SG           Balance    Balance  static sitting balance  -SG           Static Sitting Balance    Level of [place] (Unsupported Sitting, Static Balance)  contact guard assist  -SG        Sitting Position (Unsupported Sitting, Static Balance)  sitting on edge of bed  -SG         Time Able to Maintain Position (Unsupported Sitting, Static Balance)  4 to 5 minutes  -SG           Positioning and Restraints    Pre-Treatment Position  in bed  -SG        Post Treatment Position  bed  -SG        In Bed  notified nsg;supine;call light within reach;encouraged to call for assist;exit alarm on  -SG           Pain Assessment    Additional Documentation  Pain Scale: FACES Pre/Post-Treatment (Group)  -SG           Pain Scale: FACES Pre/Post-Treatment    Pain: FACES Scale, Pretreatment  6-->hurts even more  -SG        Pre/Post Treatment Pain Comment  R side  -SG           Plan of Care Review    Plan of Care Reviewed With  patient  -SG        Outcome Summary  Pt seen for OT eval this date, known to this therapist from previous BAR stay. Pt continues to be aphasic and follows little commands this date but does sit up EOB max A and grimaces in pain throughout. Pt needs max A for LB ADLs as well as simple grooming. She has some R sided weakness with spasticity noted as well as edema. She really follows limited commands for funcitonal tasks but does appear weak and with poor endurance, OT will see while in acute care to attempt improvements in deficits.  -SG           Clinical Impression (OT)    OT Diagnosis  R sided weakness, ADLs, balance  -SG        Criteria for Skilled Therapeutic Interventions Met (OT Eval)  yes;treatment indicated  -SG        Rehab Potential (OT Eval)  fair, will monitor progress closely  -SG        Therapy Frequency (OT Eval)  3 times/wk  -SG        Care Plan Review (OT)  evaluation/treatment results reviewed;care plan/treatment goals reviewed;patient/other agree to care plan  -SG        Anticipated Discharge Disposition (OT)  skilled nursing facility  -SG           OT Goals    Transfer Goal Selection (OT)  transfer, OT goal 1  -SG        Dressing Goal Selection (OT)  dressing, OT goal 1  -SG        Grooming Goal Selection (OT)  grooming, OT goal 1  -SG        Additional  Documentation  Grooming Goal Selection (OT) (Row)  -SG           Transfer Goal 1 (OT)    Activity/Assistive Device (Transfer Goal 1, OT)  sit-to-stand/stand-to-sit;toilet  -SG        [place] Level/Cues Needed (Transfer Goal 1, OT)  moderate assist (50-74% patient effort)  -SG        Time Frame (Transfer Goal 1, OT)  short term goal (STG);2 weeks  -SG        Progress/Outcome (Transfer Goal 1, OT)  goal ongoing  -SG           Dressing Goal 1 (OT)    Activity/Assistive Device (Dressing Goal 1, OT)  lower body dressing  -SG        [place]/Cues Needed (Dressing Goal 1, OT)  minimum assist (75% or more patient effort)  -SG        Time Frame (Dressing Goal 1, OT)  short term goal (STG);2 weeks  -SG        Progress/Outcome (Dressing Goal 1, OT)  goal ongoing  -SG           Grooming Goal 1 (OT)    Activity/Device (Grooming Goal 1, OT)  grooming skills, all;hair care  -SG        [place] (Grooming Goal 1, OT)  minimum assist (75% or more patient effort)  -SG        Time Frame (Grooming Goal 1, OT)  short term goal (STG);2 weeks  -SG        Progress/Outcome (Grooming Goal 1, OT)  goal ongoing  -SG          User Key  (r) = Recorded By, (t) = Taken By, (c) = Cosigned By    Initials Name Effective Dates    Evie Bae, OTR 12/26/18 -                OT Recommendation and Plan  Outcome Summary/Treatment Plan (OT)  Anticipated Discharge Disposition (OT): skilled nursing facility  Therapy Frequency (OT Eval): 3 times/wk  Plan of Care Review  Plan of Care Reviewed With: patient  Plan of Care Reviewed With: patient  Outcome Summary: Pt seen for OT eval this date, known to this therapist from previous BAR stay. Pt continues to be aphasic and follows little commands this date but does sit up EOB max A and grimaces in pain throughout. Pt needs max A for LB ADLs as well as simple grooming. She has some R sided weakness with spasticity noted as well as edema. She really follows limited commands for funcitonal tasks but  does appear weak and with poor endurance, OT will see while in acute care to attempt improvements in deficits.    Outcome Measures     Row Name 04/16/20 1400             How much help from another is currently needed...    Putting on and taking off regular lower body clothing?  1  -SG      Bathing (including washing, rinsing, and drying)  1  -SG      Toileting (which includes using toilet bed pan or urinal)  1  -SG      Putting on and taking off regular upper body clothing  2  -SG      Taking care of personal grooming (such as brushing teeth)  2  -SG      Eating meals  2  -SG      AM-PAC 6 Clicks Score (OT)  9  -SG         Functional Assessment    Outcome Measure Options  AM-PAC 6 Clicks Daily Activity (OT)  -SG        User Key  (r) = Recorded By, (t) = Taken By, (c) = Cosigned By    Initials Name Provider Type    Evie Bae OTR Occupational Therapist          Time Calculation:   Time Calculation- OT     Row Name 04/16/20 1418             Time Calculation- OT    OT Start Time  1330  -SG      OT Stop Time  1350  -SG      OT Time Calculation (min)  20 min  -SG      Total Timed Code Minutes- OT  10 minute(s)  -SG      OT Received On  04/16/20  -      OT - Next Appointment  04/20/20  -      OT Goal Re-Cert Due Date  04/30/20  -        User Key  (r) = Recorded By, (t) = Taken By, (c) = Cosigned By    Initials Name Provider Type    Evie Bae OTR Occupational Therapist        Therapy Charges for Today     Code Description Service Date Service Provider Modifiers Qty    [id number]  OT SELF CARE/MGMT/TRAIN EA 15 MIN 4/16/2020 Evie Fernando OTR GO 1    [id number]  OT EVAL MOD COMPLEXITY 2 4/16/2020 Evie Fernando OTR GO 1               JOHN Walton  4/16/2020

## 2020-04-16 NOTE — PROGRESS NOTES
Continued Stay Note  [facility]     Patient Name: Darby Workman  MRN: [id number]  Today's Date: 4/16/2020    Admit Date: 3/27/2020    Discharge Plan     Row Name 04/16/20 1651       Plan    Plan  Return to [facility]     Patient/Family in Agreement with Plan  yes    Plan Comments  Soke with [facility] will submit information for pre-cert for skilled care.  Call placed to  Ajit to confirm plan but he has not returned call.  CCP will continue to follow.  BHumeniuk RN Becky S. Humeniuk, RN

## 2020-04-16 NOTE — PROGRESS NOTES
"   LOS: 20 days   Patient Care Team:  Eric Matta MD as PCP - General (General Practice)    Chief Complaint: legs are achy     Subjective     Pt denies SOA, pain. Aphasic. Understands what I'm saying and can answer yes/no questions appropriately. Is trying to eat more. Seems much better overall today.      Subjective:  Symptoms:  Improved.  She reports weakness (right sided).  No shortness of breath, chest pain, chest pressure, anorexia or diarrhea.    Diet:  Poor intake (improving).  No vomiting.    Activity level: Impaired due to weakness.    Pain:  She reports no pain.        History taken from: patient chart family RN    Objective     Vital Signs  Temp:  [97.6 °F (36.4 °C)-98.6 °F (37 °C)] 98.4 °F (36.9 °C)  Heart Rate:  [56-74] 60  Resp:  [16-18] 16  BP: (118-152)/(53-66) 118/53    I/O last 3 completed shifts:  In: 300 [P.O.:300]  Out: 900 [Urine:900]  I/O this shift:  In: 120 [P.O.:120]  Out: -         Objective:  General Appearance:  Comfortable and in no acute distress.    Vital signs: (most recent): Blood pressure 118/53, pulse 60, temperature 98.4 °F (36.9 °C), temperature source Oral, resp. rate 16, height 165.1 cm (65\"), weight 59 kg (130 lb 1.1 oz), SpO2 98 %.  Vital signs are normal.  No fever.  (BPs better).    Output: Producing urine.    HEENT: (Dried blood around mouth, mucosa looking better  Dressings to bilateral anterior neck access sites)    Lungs:  Normal effort and normal respiratory rate.  Breath sounds clear to auscultation.  She is not in respiratory distress.  (Anteriorly)  Heart: Normal rate.  Regular rhythm.    Abdomen: Abdomen is soft.  Bowel sounds are normal.   There is no abdominal tenderness.     Extremities: There is dependent edema (of all 4 limbs).  (SCDs in place)  Pulses: Distal pulses are intact.    Neurological: Patient is alert.  (Aphasic, can follow instructions, right sided weakness but difficult to quantify).    Pupils:  Pupils are equal, round, and reactive to light.  "   Skin:  Warm and dry.              Results Review:     I reviewed the patient's new clinical results.  I reviewed the patient's other test results and agree with the interpretation  I personally viewed and interpreted the patient's EKG/Telemetry data  Discussed with pt, RN, CCP, and     Results from last 7 days   Lab Units 04/16/20  0452 04/15/20  0659 04/14/20  0816 04/13/20  0523 04/12/20  0706 04/10/20  0613   WBC 10*3/mm3 4.81 3.67 4.73 6.32 5.71 7.16   HEMOGLOBIN g/dL 8.9* 9.1* 9.6* 10.7* 10.8* 9.7*   PLATELETS 10*3/mm3 218 215 216 254 276 258     Results from last 7 days   Lab Units 04/16/20  0452 04/15/20  0659 04/14/20  0644 04/13/20  0523 04/12/20  0706 04/10/20  0613 04/09/20  1218   SODIUM mmol/L 144 141 143 152* 158* 147*  --    POTASSIUM mmol/L 3.7 3.3* 3.8 3.8 3.2* 3.1* 3.5   CHLORIDE mmol/L 108* 106 108* 117* 118* 107  --    CO2 mmol/L 27.0 27.3 26.6 27.2 27.0 25.6  --    BUN mg/dL 20 17 19 26* 32* 36*  --    CREATININE mg/dL 0.92 0.87 0.86 0.98 1.02* 1.05*  --    CALCIUM mg/dL 8.4* 8.4* 8.4* 8.7 9.0 9.0  --    MAGNESIUM mg/dL 1.6 1.6 1.7 2.0 1.7 1.8  --    PHOSPHORUS mg/dL 2.1* 2.4* 2.1* 2.2* 2.5 2.3*  --    Estimated Creatinine Clearance: 48.5 mL/min (by C-G formula based on SCr of 0.92 mg/dL).    Medication Review: reviewed and adjusted    Assessment/Plan       Upper GI bleed    Hyperlipidemia    Hypertension    Uremic encephalopathy    Acute kidney injury (CMS/HCC)    Acute pancreatitis    Hemorrhagic shock (CMS/HCC)    Thrombocytopenia (CMS/HCC)    Type 2 diabetes mellitus with hyperglycemia (CMS/HCC)    Dementia without behavioral disturbance (CMS/HCC)    Acute posthemorrhagic anemia          Plan:   (GIB/Hemorrhagic Shock: required transfusion and Kcentra initially. Duodenal ulcer and erosive gastritis noted on EGD. Anticoagulation held. Also had oral component of blood loss due to erosions of oral mucosa. GI, Surgery, and Oncology evaluated. All consultants recommended palliative care  given her poor prognosis and multiorgan failure earlier in admission, but pt making some significant improvement and she herself is not interested in palliative care. Hgb down a little at 8.9 today. Is on oral Protonix now.      Subacute left MCA CVA with aphasia: Subacute based on CT. Neurology evaluated. She is not AC or antiplatelet candidate at this time. She had this CVA  even while she was on Eliquis and aspirin prior to admission. Aphasic speech persists. RUE weak. SLP has started modified diet based on their eval 4/6. She is taking po better now after I explained to her that if she couldn't/wouldn't eat enough to support life then a PEG tube would be the only option. Both she and her  have confirmed they are not interested in PEG tube. Calorie count in progress. She ate quite a bit for breakfast this AM and is able to hold her drink on her own (!), much improved from mental status standpoint today. PT has been asked to see her again today to start rehab.     Dementia with metabolic encephalopathy: improving     Leukocytosis: resolved. No antibiotics indicated. Infectious Disease has evaluated.     AFib: on both oral Cardizem and Bystolic and is in NSR. Rate better this AM after decreasing dose of Cardizem (changed to Q8h) on 4/14.     HTN: is off Cardene gtt--BP were still running a little high. Continue PO Cardizem and Bystolic. PO Hydralazine added per Renal and BPs look good today.     TCP: resolved     KIERAN: HD last on 4/6. Shiley now removed. Creatinine 0.92 today, stable.    Hypernatremia: Na+ normalized      Hypokalemia: normal today. Replace Mg++ IV today. Oral replacement in place.     DM2: continue SSI, sugars up with increased po intake, increased SSI dose algorithm.      Disposition: Poor longterm prognosis, but not as grim as it was a week ago. Continue hernandez for comfort while awaiting family decision regarding goals of care. If she can take in sufficient calories by mouth then she may be  more appropriate for LTC. Out of restraints now.  Was able to speak to  on phone today. He would like to tentatively plan on pt returning to her SNF for ongoing care and I think that's appropriate plan at this time.).       Ajit Elder MD  04/16/20  10:23    Time: 30min

## 2020-04-17 LAB
ALBUMIN SERPL-MCNC: 2.2 G/DL (ref 3.5–5.2)
ANION GAP SERPL CALCULATED.3IONS-SCNC: 9.6 MMOL/L (ref 5–15)
BASOPHILS # BLD AUTO: 0.03 10*3/MM3 (ref 0–0.2)
BASOPHILS NFR BLD AUTO: 0.6 % (ref 0–1.5)
BUN BLD-MCNC: 21 MG/DL (ref 8–23)
BUN/CREAT SERPL: 22.6 (ref 7–25)
CALCIUM SPEC-SCNC: 8.3 MG/DL (ref 8.6–10.5)
CHLORIDE SERPL-SCNC: 102 MMOL/L (ref 98–107)
CO2 SERPL-SCNC: 29.4 MMOL/L (ref 22–29)
CREAT BLD-MCNC: 0.93 MG/DL (ref 0.57–1)
DEPRECATED RDW RBC AUTO: 50.6 FL (ref 37–54)
EOSINOPHIL # BLD AUTO: 0.25 10*3/MM3 (ref 0–0.4)
EOSINOPHIL NFR BLD AUTO: 5.4 % (ref 0.3–6.2)
ERYTHROCYTE [DISTWIDTH] IN BLOOD BY AUTOMATED COUNT: 15.6 % (ref 12.3–15.4)
GFR SERPL CREATININE-BSD FRML MDRD: 59 ML/MIN/1.73
GLUCOSE BLD-MCNC: 169 MG/DL (ref 65–99)
GLUCOSE BLDC GLUCOMTR-MCNC: 149 MG/DL (ref 70–130)
GLUCOSE BLDC GLUCOMTR-MCNC: 162 MG/DL (ref 70–130)
GLUCOSE BLDC GLUCOMTR-MCNC: 177 MG/DL (ref 70–130)
GLUCOSE BLDC GLUCOMTR-MCNC: 188 MG/DL (ref 70–130)
HCT VFR BLD AUTO: 27.3 % (ref 34–46.6)
HGB BLD-MCNC: 9 G/DL (ref 12–15.9)
IMM GRANULOCYTES # BLD AUTO: 0.02 10*3/MM3 (ref 0–0.05)
IMM GRANULOCYTES NFR BLD AUTO: 0.4 % (ref 0–0.5)
LYMPHOCYTES # BLD AUTO: 0.88 10*3/MM3 (ref 0.7–3.1)
LYMPHOCYTES NFR BLD AUTO: 18.9 % (ref 19.6–45.3)
MAGNESIUM SERPL-MCNC: 1.7 MG/DL (ref 1.6–2.4)
MCH RBC QN AUTO: 29.6 PG (ref 26.6–33)
MCHC RBC AUTO-ENTMCNC: 33 G/DL (ref 31.5–35.7)
MCV RBC AUTO: 89.8 FL (ref 79–97)
MONOCYTES # BLD AUTO: 0.3 10*3/MM3 (ref 0.1–0.9)
MONOCYTES NFR BLD AUTO: 6.4 % (ref 5–12)
NEUTROPHILS # BLD AUTO: 3.18 10*3/MM3 (ref 1.7–7)
NEUTROPHILS NFR BLD AUTO: 68.3 % (ref 42.7–76)
NRBC BLD AUTO-RTO: 0 /100 WBC (ref 0–0.2)
PHOSPHATE SERPL-MCNC: 2.6 MG/DL (ref 2.5–4.5)
PLATELET # BLD AUTO: 198 10*3/MM3 (ref 140–450)
PMV BLD AUTO: 10.6 FL (ref 6–12)
POTASSIUM BLD-SCNC: 3.3 MMOL/L (ref 3.5–5.2)
POTASSIUM BLD-SCNC: 4.4 MMOL/L (ref 3.5–5.2)
RBC # BLD AUTO: 3.04 10*6/MM3 (ref 3.77–5.28)
SODIUM BLD-SCNC: 141 MMOL/L (ref 136–145)
WBC NRBC COR # BLD: 4.66 10*3/MM3 (ref 3.4–10.8)

## 2020-04-17 PROCEDURE — 97162 PT EVAL MOD COMPLEX 30 MIN: CPT | Performed by: PHYSICAL THERAPIST

## 2020-04-17 PROCEDURE — 85025 COMPLETE CBC W/AUTO DIFF WBC: CPT | Performed by: INTERNAL MEDICINE

## 2020-04-17 PROCEDURE — 63710000001 INSULIN LISPRO (HUMAN) PER 5 UNITS: Performed by: HOSPITALIST

## 2020-04-17 PROCEDURE — 84132 ASSAY OF SERUM POTASSIUM: CPT | Performed by: INTERNAL MEDICINE

## 2020-04-17 PROCEDURE — 83735 ASSAY OF MAGNESIUM: CPT | Performed by: NURSE PRACTITIONER

## 2020-04-17 PROCEDURE — 97110 THERAPEUTIC EXERCISES: CPT | Performed by: PHYSICAL THERAPIST

## 2020-04-17 PROCEDURE — 80069 RENAL FUNCTION PANEL: CPT | Performed by: HOSPITALIST

## 2020-04-17 PROCEDURE — 82962 GLUCOSE BLOOD TEST: CPT

## 2020-04-17 RX ORDER — POTASSIUM CHLORIDE 750 MG/1
40 CAPSULE, EXTENDED RELEASE ORAL AS NEEDED
Status: DISCONTINUED | OUTPATIENT
Start: 2020-04-17 | End: 2020-04-20 | Stop reason: HOSPADM

## 2020-04-17 RX ORDER — POTASSIUM CHLORIDE 1.5 G/1.77G
40 POWDER, FOR SOLUTION ORAL AS NEEDED
Status: DISCONTINUED | OUTPATIENT
Start: 2020-04-17 | End: 2020-04-20 | Stop reason: HOSPADM

## 2020-04-17 RX ADMIN — INSULIN LISPRO 2 UNITS: 100 INJECTION, SOLUTION INTRAVENOUS; SUBCUTANEOUS at 11:39

## 2020-04-17 RX ADMIN — POTASSIUM CHLORIDE 40 MEQ: 10 CAPSULE, COATED, EXTENDED RELEASE ORAL at 17:04

## 2020-04-17 RX ADMIN — INSULIN LISPRO 2 UNITS: 100 INJECTION, SOLUTION INTRAVENOUS; SUBCUTANEOUS at 17:07

## 2020-04-17 RX ADMIN — DILTIAZEM HYDROCHLORIDE 60 MG: 60 TABLET, FILM COATED ORAL at 21:11

## 2020-04-17 RX ADMIN — HYDRALAZINE HYDROCHLORIDE 25 MG: 25 TABLET, FILM COATED ORAL at 06:23

## 2020-04-17 RX ADMIN — PANTOPRAZOLE SODIUM 40 MG: 40 TABLET, DELAYED RELEASE ORAL at 17:04

## 2020-04-17 RX ADMIN — DILTIAZEM HYDROCHLORIDE 60 MG: 60 TABLET, FILM COATED ORAL at 06:23

## 2020-04-17 RX ADMIN — HYDRALAZINE HYDROCHLORIDE 25 MG: 25 TABLET, FILM COATED ORAL at 13:42

## 2020-04-17 RX ADMIN — TORSEMIDE 40 MG: 20 TABLET ORAL at 09:18

## 2020-04-17 RX ADMIN — POTASSIUM CHLORIDE 40 MEQ: 10 CAPSULE, COATED, EXTENDED RELEASE ORAL at 12:45

## 2020-04-17 RX ADMIN — PANTOPRAZOLE SODIUM 40 MG: 40 TABLET, DELAYED RELEASE ORAL at 06:22

## 2020-04-17 RX ADMIN — DILTIAZEM HYDROCHLORIDE 60 MG: 60 TABLET, FILM COATED ORAL at 13:43

## 2020-04-17 RX ADMIN — NEBIVOLOL HYDROCHLORIDE 20 MG: 10 TABLET ORAL at 09:18

## 2020-04-17 RX ADMIN — INSULIN LISPRO 2 UNITS: 100 INJECTION, SOLUTION INTRAVENOUS; SUBCUTANEOUS at 09:18

## 2020-04-17 RX ADMIN — HYDRALAZINE HYDROCHLORIDE 25 MG: 25 TABLET, FILM COATED ORAL at 21:11

## 2020-04-17 NOTE — PROGRESS NOTES
Continued Stay Note  [facility]     Patient Name: Darby Workman  MRN: [id number]  Today's Date: 4/17/2020    Admit Date: 3/27/2020    Discharge Plan     Row Name 04/17/20 1049       Plan    Plan  [facility]; pt from  level and plan is to return skilled if PT finds a skilled need.      Patient/Family in Agreement with Plan  yes    Plan Comments  Placed call to spouse Ajit @ [phone] and confirmed d/c plan remains for [facility]- states he has s/w Ingris Santillan at the facility and with Dr Elder yesterday.  He would like to visit her/ ride with her in the ambulance but was advised that currently that is not being allowed.  S/w [facility] with [facility] and confirmed bed for weekend.                   Henna Nieves RN

## 2020-04-17 NOTE — PROGRESS NOTES
"   LOS: 21 days   Patient Care Team:  Eric Matta MD as PCP - General (General Practice)    Chief Complaint: legs are achy     Subjective     Pt denies SOA, pain. Aphasic. Understands what I'm saying and can answer yes/no questions appropriately. Is trying to eat more. Seems much better overall today.    Altered Mental Status   Associated symptoms include weakness (right sided). Pertinent negatives include no anorexia, chest pain, fever or vomiting.       Subjective:  Symptoms:  Improved.  She reports weakness (right sided).  No shortness of breath, chest pain, chest pressure, anorexia or diarrhea.    Diet:  Poor intake (improving).  No vomiting.    Activity level: Impaired due to weakness.    Pain:  She reports no pain.        History taken from: patient chart family RN    Objective     Vital Signs  Temp:  [97.4 °F (36.3 °C)-99.6 °F (37.6 °C)] 98.4 °F (36.9 °C)  Heart Rate:  [63-71] 71  Resp:  [16-18] 16  BP: (113-153)/(56-72) 120/68    I/O last 3 completed shifts:  In: 360 [P.O.:360]  Out: 3350 [Urine:3350]  I/O this shift:  In: -   Out: 1550 [Urine:1550]        Objective:  General Appearance:  Comfortable and in no acute distress.    Vital signs: (most recent): Blood pressure 120/68, pulse 71, temperature 98.4 °F (36.9 °C), temperature source Oral, resp. rate 16, height 165.1 cm (65\"), weight 58.3 kg (128 lb 8.5 oz), SpO2 98 %.  Vital signs are normal.  No fever.  (BPs better).    Output: Producing urine.    HEENT: (Dried blood around mouth, mucosa looking better  Dressings to bilateral anterior neck access sites)    Lungs:  Normal effort and normal respiratory rate.  Breath sounds clear to auscultation.  She is not in respiratory distress.  (Anteriorly)  Heart: Normal rate.  Regular rhythm.    Abdomen: Abdomen is soft.  Bowel sounds are normal.   There is no abdominal tenderness.     Extremities: There is dependent edema (of all 4 limbs).  (SCDs in place)  Pulses: Distal pulses are intact.    Neurological: " Patient is alert.  (Aphasic, can follow instructions, right sided weakness but difficult to quantify).    Pupils:  Pupils are equal, round, and reactive to light.    Skin:  Warm and dry.              Results Review:     I reviewed the patient's new clinical results.  I reviewed the patient's other test results and agree with the interpretation  I personally viewed and interpreted the patient's EKG/Telemetry data  Discussed with pt, RN, CCP, and     Results from last 7 days   Lab Units 04/17/20  0533 04/16/20 0452 04/15/20  0659 04/14/20  0816 04/13/20  0523 04/12/20  0706   WBC 10*3/mm3 4.66 4.81 3.67 4.73 6.32 5.71   HEMOGLOBIN g/dL 9.0* 8.9* 9.1* 9.6* 10.7* 10.8*   PLATELETS 10*3/mm3 198 218 215 216 254 276     Results from last 7 days   Lab Units 04/17/20  0533 04/16/20 2057 04/16/20  0452 04/15/20  0659 04/14/20  0644 04/13/20  0523 04/12/20  0706   SODIUM mmol/L 141  --  144 141 143 152* 158*   POTASSIUM mmol/L 3.3*  --  3.7 3.3* 3.8 3.8 3.2*   CHLORIDE mmol/L 102  --  108* 106 108* 117* 118*   CO2 mmol/L 29.4*  --  27.0 27.3 26.6 27.2 27.0   BUN mg/dL 21  --  20 17 19 26* 32*   CREATININE mg/dL 0.93  --  0.92 0.87 0.86 0.98 1.02*   CALCIUM mg/dL 8.3*  --  8.4* 8.4* 8.4* 8.7 9.0   MAGNESIUM mg/dL 1.7  --  1.6 1.6 1.7 2.0 1.7   PHOSPHORUS mg/dL 2.6 2.6 2.1* 2.4* 2.1* 2.2* 2.5   Estimated Creatinine Clearance: 47.4 mL/min (by C-G formula based on SCr of 0.93 mg/dL).    Medication Review: reviewed and adjusted    Assessment/Plan       Upper GI bleed    Hyperlipidemia    Hypertension    Uremic encephalopathy    Acute kidney injury (CMS/HCC)    Acute pancreatitis    Hemorrhagic shock (CMS/HCC)    Thrombocytopenia (CMS/HCC)    Type 2 diabetes mellitus with hyperglycemia (CMS/HCC)    Dementia without behavioral disturbance (CMS/HCC)    Acute posthemorrhagic anemia    1. GI bleed with hemorrhagic shock requiring PRBC transfusion or recurrent hospital stay as well as administration of case and dry.  Patient  underwent EGD which revealed duodenal ulcer and erosive gastritis.  Anticoagulation has been held.  Palliative care was recommended by consultants however patient is not interested as well as  does not want to pursue the same.  On p.o. Protonix which will be continued.  Plan is to go to rehab/ memory care unit upon discharge.    2. Subacute left MCA CVA with aphasia, unfortunately she is not a candidate for antiplatelet or anticoagulation given the risk of significant bleed.  She continues to have aphasic speech.  She is on a modified diet as per recommended by speech.  Reportedly patient and  did not want any PEG tube placement.  3. Dementia with metabolic encephalopathy has been improving.  4. Leukocytosis has resolved.  5. History of atrial fibrillation, continue with Cardizem and Bystolic.    6. Hypertension, patient is off Cardene drip and will continue with p.o. Cardizem and Bystolic.  On hydralazine as well.    7. Acute kidney injury, patient did undergo hemodialysis during this hospital stay and creatinine has returned to normal now.  Shiley was also removed.    8. Hypokalemia on replacement protocol.  9. Diabetes mellitus, will continue with corrective dose insulin for the moment.    Christos Garcia MD  04/17/20  18:17

## 2020-04-17 NOTE — PLAN OF CARE
Problem: Patient Care Overview  Goal: Plan of Care Review  Flowsheets (Taken 4/17/2020 1116)  Outcome Summary: Pt was sleep when PT arrives, but aroused easily. Pt remained lethargic throughout session and fatigued quickly while sitting EOB. Pt moved LUE/LLE with assist and tolerated ROM exercises well. Pt grimaces in pain with movement of R side and does not participate in ROM exercises with RUE/RLE. Pt did answer yes or no at times today, but overall had difficulty following commands. PT will follow 3 times per week, but suspect pt is close to baseline.        no

## 2020-04-17 NOTE — PLAN OF CARE
Problem: Patient Care Overview  Goal: Plan of Care Review  Outcome: Ongoing (interventions implemented as appropriate)  Flowsheets (Taken 4/17/2020 0338)  Progress: no change  Plan of Care Reviewed With: patient  Outcome Summary: Pt denies pain and SOA. Pt makes eye contact when name is stated. Medications administered per orders. Catheter care complete, tolerated well. Turned Q2 hours. SCDs in place. VSS. No s/s of distress at this time. Will continue to monitor.      Goal: Individualization and Mutuality  Outcome: Ongoing (interventions implemented as appropriate)  Goal: Discharge Needs Assessment  Outcome: Ongoing (interventions implemented as appropriate)  Goal: Interprofessional Rounds/Family Conf  Outcome: Ongoing (interventions implemented as appropriate)     Problem: Fall Risk (Adult)  Goal: Absence of Fall  Outcome: Ongoing (interventions implemented as appropriate)     Problem: Renal Failure/Kidney Injury, Acute (Adult)  Goal: Signs and Symptoms of Listed Potential Problems Will be Absent, Minimized or Managed (Renal Failure/Kidney Injury, Acute)  Outcome: Ongoing (interventions implemented as appropriate)     Problem: Pain, Acute (Adult)  Goal: Acceptable Pain Control/Comfort Level  Outcome: Ongoing (interventions implemented as appropriate)     Problem: Skin Injury Risk (Adult)  Goal: Skin Health and Integrity  Outcome: Ongoing (interventions implemented as appropriate)     Problem: Gastrointestinal Bleeding (Adult)  Goal: Signs and Symptoms of Listed Potential Problems Will be Absent, Minimized or Managed (Gastrointestinal Bleeding)  Outcome: Ongoing (interventions implemented as appropriate)

## 2020-04-17 NOTE — PROGRESS NOTES
"Adult Nutrition  Assessment/PES    Patient Name:  Darby Workman  YOB: 1945  MRN: [id number]  Admit Date:  3/27/2020    Assessment Date:  4/17/2020    Comments:  Nutrition follow up.  PO intake much improved - eating % at meals.  Receiving Boost Pudding TID.  Lethargic, quickly fatigued.  Bed available at facility over weekend - possible d/c.    Due to the COVID pandemic, nutrition assessment completed based on review of electronic medical record. This RD currently working remotely and can be reached at [phone], via secure chat or email.     RD will continue to monitor.     Reason for Assessment     Row Name 04/17/20 1210          Reason for Assessment    Reason For Assessment  follow-up protocol         Nutrition/Diet History     Row Name 04/17/20 1210          Nutrition/Diet History    Typical Food/Fluid Intake  PO intake improved, % at meals         Anthropometrics     Row Name 04/17/20 1211 04/17/20 0504       Anthropometrics    Height  165.1 cm (65\")  --    Weight  58.3 kg (128 lb 8.5 oz) per nursing staff documentation 4/17  58.3 kg (128 lb 8.5 oz)       Admit Weight    Admit Weight  -- 128# 4/17  --       Ideal Body Weight (IBW)    Ideal Body Weight (IBW) (kg)  57.29  --    % Ideal Body Weight  101.76  --       Body Mass Index (BMI)    BMI (kg/m2)  21.43  --    BMI Assessment  BMI 18.5-24.9: normal 21.34  --        Labs/Tests/Procedures/Meds     Row Name 04/17/20 1212          Labs/Procedures/Meds    Lab Results Reviewed  reviewed, pertinent     Lab Results Comments  Gluc, K, Ca, GFR, Alb, Hgb, Hct        Diagnostic Tests/Procedures    Diagnostic Test/Procedure Reviewed  reviewed, pertinent        Medications    Pertinent Medications Reviewed  reviewed, pertinent     Pertinent Medications Comments  humalog, protonix, demadex         Physical Findings     Row Name 04/17/20 1213          Physical Findings    Skin  -- B=14, bruised         Estimated/Assessed Needs     Row Name " "04/17/20 1211          Calculation Measurements    Height  165.1 cm (65\")         Nutrition Prescription Ordered     Row Name 04/17/20 1213          Nutrition Prescription PO    Current PO Diet  Dysphagia     Dysphagia Level  3  Pureed with some mashed     Fluid Consistency  Nectar/syrup thick     Supplement  Boost Pudding (Ensure Pudding)     Supplement Frequency  3 times a day     Common Modifiers  Cardiac     Other Modifiers  No Straws         Evaluation of Received Nutrient/Fluid Intake     Row Name 04/17/20 1213          PO Evaluation    Number of Meals  4     % PO Intake           Problem/Interventions:    Intervention Goal     Row Name 04/17/20 1214          Intervention Goal    General  Maintain nutrition;Reduce/improve symptoms;Disease management/therapy     PO  Increase intake;PO intake (%)     PO Intake %  75 %     Weight  Maintain weight         Nutrition Intervention     Row Name 04/17/20 1214          Nutrition Intervention    RD/Tech Action  Follow Tx progress;Care plan reviewd     Recommended/Ordered  Supplement         Education/Evaluation     Row Name 04/17/20 1214          Education    Education  Will Instruct as appropriate        Monitor/Evaluation    Monitor  Per protocol;PO intake;Supplement intake;Pertinent labs;Weight;Symptoms           Electronically signed by:  Marialuisa Smith RD  04/17/20 12:15  "

## 2020-04-17 NOTE — THERAPY EVALUATION
Patient Name: Darby Workman  : 1945    MRN: [id number]                              Today's Date: 2020       Admit Date: 3/27/2020    Visit Dx:     ICD-10-CM ICD-9-CM   1. Upper GI bleed K92.2 578.9   2. Acute renal failure, unspecified acute renal failure type (CMS/HCC) N17.9 584.9   3. Elevated LFTs R94.5 790.6   4. Hyperkalemia E87.5 276.7     Patient Active Problem List   Diagnosis   • Hypertensive crisis   • Diabetes mellitus (CMS/[facility])   • Hyperlipidemia   • Hypertension   • Elevated troponin   • Acute cerebrovascular accident (CVA) of cerebellum (CMS/[facility])   • Right-sided headache   • Acute ischemic stroke (CMS/[facility])   • Embolic stroke (CMS/[facility])   • Anticoagulated by anticoagulation treatment   • Stroke (cerebrum) (CMS/[facility])   • Dysthymic disorder   • Hypotension   • Upper GI bleed   • Uremic encephalopathy   • Acute kidney injury (CMS/[facility])   • Acute pancreatitis   • Hemorrhagic shock (CMS/[facility])   • Thrombocytopenia (CMS/[facility])   • Type 2 diabetes mellitus with hyperglycemia (CMS/[facility])   • Dementia without behavioral disturbance (CMS/[facility])   • Acute posthemorrhagic anemia     Past Medical History:   Diagnosis Date   • Acute cerebrovascular accident (CVA) of cerebellum (CMS/[facility])    • Acute ischemic stroke (CMS/[facility])    • Arthritis    • Coronary artery disease    • CVA (cerebral vascular accident) (CMS/[facility])    • Diabetes mellitus (CMS/[facility])    • Heart attack (CMS/[facility])    • History of blood clots    • Hyperlipidemia    • Hypertension    • Vision loss      Past Surgical History:   Procedure Laterality Date   • CAROTID ENDARTERECTOMY Left 2019    Procedure: Left carotid endarterectomy;  Surgeon: Rupert Oliva MD;  Location: [facility] OR;  Service: Neurosurgery   • EMBOLECTOMY Left 2019    Procedure: CEREBRAL ANGIOGRAM, LEFT INTERNAL CAROTID ARTERY STENT;  Surgeon: Rupert Oliva MD;  Location[facility] OR ;  Service: Neurosurgery   • ENDOSCOPY N/A 3/28/2020    Procedure:  ESOPHAGOGASTRODUODENOSCOPY AT BEDSIDE WITH EPI INJECTION AND GOLD PROBE CAUTERIZATION;  Surgeon: Malathi Bright MD;  Location: [facility] ENDOSCOPY;  Service: Gastroenterology;  Laterality: N/A;  PRE GI BLEED  POST EROSIVE GASTRITIS, DUODENAL ULCER WITH CLOT     General Information     Row Name 04/17/20 1110          PT Evaluation Time/Intention    Document Type  evaluation  -     Mode of Treatment  individual therapy;physical therapy  -     Row Name 04/17/20 1110          General Information    Prior Level of Function  -- PLOF unclear  -     Existing Precautions/Restrictions  fall  -     Barriers to Rehab  previous functional deficit;medically complex;cognitive status  -     Row Name 04/17/20 1110          Relationship/Environment    Lives With  facility resident  -     Row Name 04/17/20 1110          Resource/Environmental Concerns    Current Living Arrangements  extended care facility  -     Row Name 04/17/20 1110          Safety Issues, Functional Mobility    Impairments Affecting Function (Mobility)  balance;motor control;postural/trunk control;range of motion (ROM);endurance/activity tolerance  -       User Key  (r) = Recorded By, (t) = Taken By, (c) = Cosigned By    Initials Name Provider Type     Keyanna Hester, PT Physical Therapist        Mobility     Row Name 04/17/20 1111          Bed Mobility Assessment/Treatment    Bed Mobility Assessment/Treatment  supine-sit;sit-supine  -     Supine-Sit Junction (Bed Mobility)  moderate assist (50% patient effort);verbal cues;nonverbal cues (demo/gesture)  -     Sit-Supine Junction (Bed Mobility)  maximum assist (25% patient effort)  -     Assistive Device (Bed Mobility)  bed rails;head of bed elevated  -     Comment (Bed Mobility)  Pt very lethargic, difficulty keeping her awake sitting EOB  -     Row Name 04/17/20 1111          Transfer Assessment/Treatment    Comment (Transfers)  Deferred secondary to pt pain and  lethargy  -[facility] Name 04/17/20 1111          Sit-Stand Transfer    Sit-Stand [place] (Transfers)  not tested  -       User Key  (r) = Recorded By, (t) = Taken By, (c) = Cosigned By    Initials Name Provider Type    Keyanna Lundy, HESHAM Physical Therapist        Obj/Interventions     [facility] Name 04/17/20 1112          General ROM    GENERAL ROM COMMENTS  AAROM WFL on L side, PROM limited 25% with some spasticity noted in R ankle. Pt did asisst with ROM exercise son L , but not on the R  -[facility] Name 04/17/20 1112          MMT (Manual Muscle Testing)    General MMT Comments  difficult to assess, at least 2/10 in LLE  -[facility] Name 04/17/20 1112          Therapeutic Exercise    Comment (Therapeutic Exercise)  BLE AP, HS, hip abd/add AAROM/PROM x 10 reps, BUE elboe flex/ext, shoulder flex/ext x 10 reps  -[facility] Name 04/17/20 1112          Static Sitting Balance    Level of [place] (Unsupported Sitting, Static Balance)  minimal assist, 75% patient effort  -     Sitting Position (Unsupported Sitting, Static Balance)  sitting on edge of bed  -     Time Able to Maintain Position (Unsupported Sitting, Static Balance)  4 to 5 minutes  -[facility] Name 04/17/20 1112          Dynamic Sitting Balance    Level of [place], Reaches Outside Midline (Sitting, Dynamic Balance)  minimal assist, 75% patient effort;moderate assist, 50 to 74% patient effort  -     Sitting Position, Reaches Outside Midline (Sitting, Dynamic Balance)  sitting on edge of bed  -       User Key  (r) = Recorded By, (t) = Taken By, (c) = Cosigned By    Initials Name Provider Type    Keyanna Lundy PT Physical Therapist        Goals/Plan     [facility] Name 04/17/20 1123          Bed Mobility Goal 1 (PT)    Activity/Assistive Device (Bed Mobility Goal 1, PT)  bed mobility activities, all  -     [place]co Level/Cues Needed (Bed Mobility Goal 1, PT)  minimum assist (75% or more patient effort)  -     Time  Frame (Bed Mobility Goal 1, PT)  1 week  -     Row Name 04/17/20 1123          Transfer Goal 1 (PT)    Activity/Assistive Device (Transfer Goal 1, PT)  sit-to-stand/stand-to-sit;bed-to-chair/chair-to-bed  -     Walker Level/Cues Needed (Transfer Goal 1, PT)  moderate assist (50-74% patient effort);2 person assist  -     Time Frame (Transfer Goal 1, PT)  1 week  -     Row Name 04/17/20 1123          Patient Education Goal (PT)    Activity (Patient Education Goal, PT)  HEP- 15 reps of ROM exercises BLE  -     Walker/Cues/Accuracy (Memory Goal 2, PT)  demonstrates adequately  -       User Key  (r) = Recorded By, (t) = Taken By, (c) = Cosigned By    Initials Name Provider Type    Keyanna Lundy, PT Physical Therapist        Clinical Impression     Row Name 04/17/20 1116          Pain Assessment    Additional Documentation  Pain Scale: FACES Pre/Post-Treatment (Group)  -[facility] Name 04/17/20 1116          Pain Scale: FACES Pre/Post-Treatment    Pain: FACES Scale, Pretreatment  4-->hurts little more  -     Pain: FACES Scale, Post-Treatment  6-->hurts even more  -     Pre/Post Treatment Pain Comment  R UE/RLE  -[facility] Name 04/17/20 1116          Plan of Care Review    Plan of Care Reviewed With  patient  -     Outcome Summary  Pt was sleep when PT arrives, but aroused easily. Pt remained lethargic throughout session and fatigued quickly while sitting EOB. Pt moved LUE/LLE with assist and tolerated ROM exercises well. Pt grimaces in pain with movement of R side and does not participate in ROM exercises with RUE/RLE. Pt did answer yes or no at times today, but overall had difficulty following commands. PT will follow 3 times per week, but suspect pt is close to baseline.    -     Row Name 04/17/20 1116          Physical Therapy Clinical Impression    Criteria for Skilled Interventions Met (PT Clinical Impression)  treatment indicated  -     Rehab Potential (PT Clinical  Summary)  fair, will monitor progress closely  -     Row Name 04/17/20 1116          Positioning and Restraints    Pre-Treatment Position  in bed  -     Post Treatment Position  bed  -KH     In Bed  fowlers;call light within reach;encouraged to call for assist;exit alarm on;heels elevated  -       User Key  (r) = Recorded By, (t) = Taken By, (c) = Cosigned By    Initials Name Provider Type    Keyanna Lundy, PT Physical Therapist        Outcome Measures     Row Name 04/17/20 1125          How much help from another person do you currently need...    Turning from your back to your side while in flat bed without using bedrails?  2  -KH     Moving from lying on back to sitting on the side of a flat bed without bedrails?  2  -KH     Moving to and from a bed to a chair (including a wheelchair)?  1  -KH     Standing up from a chair using your arms (e.g., wheelchair, bedside chair)?  1  -KH     Climbing 3-5 steps with a railing?  1  -KH     To walk in hospital room?  1  -KH     AM-PAC 6 Clicks Score (PT)  8  -     Row Name 04/17/20 1125          Functional Assessment    Outcome Measure Options  AM-PAC 6 Clicks Basic Mobility (PT)  -       User Key  (r) = Recorded By, (t) = Taken By, (c) = Cosigned By    Initials Name Provider Type    Keyanna Lundy, PT Physical Therapist          PT Recommendation and Plan  Planned Therapy Interventions (PT Eval): bed mobility training, balance training, home exercise program, patient/family education, strengthening, ROM (range of motion), transfer training, stretching, gait training  Outcome Summary/Treatment Plan (PT)  Anticipated Discharge Disposition (PT): extended care facility, skilled nursing facility  Plan of Care Reviewed With: patient  Outcome Summary: Pt was sleep when PT arrives, but aroused easily. Pt remained lethargic throughout session and fatigued quickly while sitting EOB. Pt moved LUE/LLE with assist and tolerated ROM exercises well. Pt  grimaces in pain with movement of R side and does not participate in ROM exercises with RUE/RLE. Pt did answer yes or no at times today, but overall had difficulty following commands. PT will follow 3 times per week, but suspect pt is close to baseline.       Time Calculation:   PT Charges     Row Name 04/17/20 1101             Time Calculation    Start Time  0941  -      Stop Time  1001  -      Time Calculation (min)  20 min  -      PT Received On  04/17/20  -      PT - Next Appointment  04/20/20  -      PT Goal Re-Cert Due Date  04/24/20  -         Time Calculation- PT    Total Timed Code Minutes- PT  10 minute(s)  -        User Key  (r) = Recorded By, (t) = Taken By, (c) = Cosigned By    Initials Name Provider Type    Keyanna Lundy, HESHAM Physical Therapist        Therapy Charges for Today     Code Description Service Date Service Provider Modifiers Qty    02405112342 HC PT EVAL MOD COMPLEXITY 2 4/17/2020 Keyanna Hester, PT GP 1    47885287203 HC PT THER PROC EA 15 MIN 4/17/2020 Keyanna Hester, PT GP 1          PT G-Codes  Outcome Measure Options: AM-PAC 6 Clicks Basic Mobility (PT)  AM-PAC 6 Clicks Score (PT): 8  AM-PAC 6 Clicks Score (OT): 9    Keyanna Hester PT  4/17/2020

## 2020-04-17 NOTE — PLAN OF CARE
Problem: Patient Care Overview  Goal: Plan of Care Review  Flowsheets  Taken 4/17/2020 1646 by Josesito Rosenthal, RN  Progress: improving  Outcome Summary: Pt VSS. She is still confused, aphasic and has garbled speech but does well with yes/no questions. Hgb holding at 9. Takes medications crushed in ice cream or mixed within food. Possibly back to [facility] tomorrow.  Taken 4/17/2020 1116 by Keyanna Hester, PT  Plan of Care Reviewed With: patient

## 2020-04-18 LAB
ALBUMIN SERPL-MCNC: 2.5 G/DL (ref 3.5–5.2)
ANION GAP SERPL CALCULATED.3IONS-SCNC: 9.4 MMOL/L (ref 5–15)
BASOPHILS # BLD AUTO: 0.04 10*3/MM3 (ref 0–0.2)
BASOPHILS NFR BLD AUTO: 0.7 % (ref 0–1.5)
BUN BLD-MCNC: 20 MG/DL (ref 8–23)
BUN/CREAT SERPL: 20.6 (ref 7–25)
CALCIUM SPEC-SCNC: 8.7 MG/DL (ref 8.6–10.5)
CHLORIDE SERPL-SCNC: 105 MMOL/L (ref 98–107)
CO2 SERPL-SCNC: 31.6 MMOL/L (ref 22–29)
CREAT BLD-MCNC: 0.97 MG/DL (ref 0.57–1)
DEPRECATED RDW RBC AUTO: 52.9 FL (ref 37–54)
EOSINOPHIL # BLD AUTO: 0.15 10*3/MM3 (ref 0–0.4)
EOSINOPHIL NFR BLD AUTO: 2.8 % (ref 0.3–6.2)
ERYTHROCYTE [DISTWIDTH] IN BLOOD BY AUTOMATED COUNT: 15.9 % (ref 12.3–15.4)
GFR SERPL CREATININE-BSD FRML MDRD: 56 ML/MIN/1.73
GLUCOSE BLD-MCNC: 231 MG/DL (ref 65–99)
GLUCOSE BLDC GLUCOMTR-MCNC: 217 MG/DL (ref 70–130)
GLUCOSE BLDC GLUCOMTR-MCNC: 228 MG/DL (ref 70–130)
GLUCOSE BLDC GLUCOMTR-MCNC: 229 MG/DL (ref 70–130)
GLUCOSE BLDC GLUCOMTR-MCNC: 251 MG/DL (ref 70–130)
HCT VFR BLD AUTO: 31.3 % (ref 34–46.6)
HGB BLD-MCNC: 10 G/DL (ref 12–15.9)
IMM GRANULOCYTES # BLD AUTO: 0.02 10*3/MM3 (ref 0–0.05)
IMM GRANULOCYTES NFR BLD AUTO: 0.4 % (ref 0–0.5)
LYMPHOCYTES # BLD AUTO: 0.72 10*3/MM3 (ref 0.7–3.1)
LYMPHOCYTES NFR BLD AUTO: 13.5 % (ref 19.6–45.3)
MAGNESIUM SERPL-MCNC: 1.6 MG/DL (ref 1.6–2.4)
MCH RBC QN AUTO: 29.6 PG (ref 26.6–33)
MCHC RBC AUTO-ENTMCNC: 31.9 G/DL (ref 31.5–35.7)
MCV RBC AUTO: 92.6 FL (ref 79–97)
MONOCYTES # BLD AUTO: 0.37 10*3/MM3 (ref 0.1–0.9)
MONOCYTES NFR BLD AUTO: 6.9 % (ref 5–12)
NEUTROPHILS # BLD AUTO: 4.04 10*3/MM3 (ref 1.7–7)
NEUTROPHILS NFR BLD AUTO: 75.7 % (ref 42.7–76)
NRBC BLD AUTO-RTO: 0 /100 WBC (ref 0–0.2)
PHOSPHATE SERPL-MCNC: 2.8 MG/DL (ref 2.5–4.5)
PLATELET # BLD AUTO: 225 10*3/MM3 (ref 140–450)
PMV BLD AUTO: 10.6 FL (ref 6–12)
POTASSIUM BLD-SCNC: 4.3 MMOL/L (ref 3.5–5.2)
RBC # BLD AUTO: 3.38 10*6/MM3 (ref 3.77–5.28)
SODIUM BLD-SCNC: 146 MMOL/L (ref 136–145)
WBC NRBC COR # BLD: 5.34 10*3/MM3 (ref 3.4–10.8)

## 2020-04-18 PROCEDURE — 83735 ASSAY OF MAGNESIUM: CPT | Performed by: NURSE PRACTITIONER

## 2020-04-18 PROCEDURE — 85025 COMPLETE CBC W/AUTO DIFF WBC: CPT | Performed by: INTERNAL MEDICINE

## 2020-04-18 PROCEDURE — 82962 GLUCOSE BLOOD TEST: CPT

## 2020-04-18 PROCEDURE — 80069 RENAL FUNCTION PANEL: CPT | Performed by: HOSPITALIST

## 2020-04-18 PROCEDURE — 63710000001 INSULIN LISPRO (HUMAN) PER 5 UNITS: Performed by: HOSPITALIST

## 2020-04-18 RX ADMIN — INSULIN LISPRO 4 UNITS: 100 INJECTION, SOLUTION INTRAVENOUS; SUBCUTANEOUS at 08:36

## 2020-04-18 RX ADMIN — DILTIAZEM HYDROCHLORIDE 60 MG: 60 TABLET, FILM COATED ORAL at 06:21

## 2020-04-18 RX ADMIN — HYDRALAZINE HYDROCHLORIDE 25 MG: 25 TABLET, FILM COATED ORAL at 06:21

## 2020-04-18 RX ADMIN — INSULIN LISPRO 4 UNITS: 100 INJECTION, SOLUTION INTRAVENOUS; SUBCUTANEOUS at 17:27

## 2020-04-18 RX ADMIN — DILTIAZEM HYDROCHLORIDE 60 MG: 60 TABLET, FILM COATED ORAL at 21:36

## 2020-04-18 RX ADMIN — PANTOPRAZOLE SODIUM 40 MG: 40 TABLET, DELAYED RELEASE ORAL at 06:25

## 2020-04-18 RX ADMIN — HYDRALAZINE HYDROCHLORIDE 25 MG: 25 TABLET, FILM COATED ORAL at 21:36

## 2020-04-18 RX ADMIN — NEBIVOLOL HYDROCHLORIDE 20 MG: 10 TABLET ORAL at 08:36

## 2020-04-18 RX ADMIN — DILTIAZEM HYDROCHLORIDE 60 MG: 60 TABLET, FILM COATED ORAL at 14:53

## 2020-04-18 RX ADMIN — INSULIN LISPRO 6 UNITS: 100 INJECTION, SOLUTION INTRAVENOUS; SUBCUTANEOUS at 12:43

## 2020-04-18 RX ADMIN — HYDRALAZINE HYDROCHLORIDE 25 MG: 25 TABLET, FILM COATED ORAL at 14:53

## 2020-04-18 RX ADMIN — TORSEMIDE 40 MG: 20 TABLET ORAL at 08:36

## 2020-04-18 RX ADMIN — PANTOPRAZOLE SODIUM 40 MG: 40 TABLET, DELAYED RELEASE ORAL at 17:28

## 2020-04-18 RX ADMIN — INSULIN LISPRO 4 UNITS: 100 INJECTION, SOLUTION INTRAVENOUS; SUBCUTANEOUS at 21:36

## 2020-04-18 NOTE — PLAN OF CARE
Pt making progress toward goals, NAD, vs stable, bs mgmt, self care enc for fluid intake meeting goal      Problem: Fall Risk (Adult)  Goal: Absence of Fall  Outcome: Ongoing (interventions implemented as appropriate)     Problem: Restraint, Nonbehavioral (Nonviolent)  Goal: Rationale and Justification  Outcome: Ongoing (interventions implemented as appropriate)  Goal: Nonbehavioral (Nonviolent) Restraint: Absence of Injury/Harm  Outcome: Ongoing (interventions implemented as appropriate)  Goal: Nonbehavioral (Nonviolent) Restraint: Achievement of Discontinuation Criteria  Outcome: Ongoing (interventions implemented as appropriate)  Goal: Nonbehavioral (Nonviolent) Restraint: Preservation of Dignity and Wellbeing  Outcome: Ongoing (interventions implemented as appropriate)     Problem: Renal Failure/Kidney Injury, Acute (Adult)  Goal: Signs and Symptoms of Listed Potential Problems Will be Absent, Minimized or Managed (Renal Failure/Kidney Injury, Acute)  Outcome: Ongoing (interventions implemented as appropriate)     Problem: Pain, Acute (Adult)  Goal: Acceptable Pain Control/Comfort Level  Outcome: Ongoing (interventions implemented as appropriate)     Problem: Gastrointestinal Bleeding (Adult)  Goal: Signs and Symptoms of Listed Potential Problems Will be Absent, Minimized or Managed (Gastrointestinal Bleeding)  Outcome: Ongoing (interventions implemented as appropriate)     Problem: Skin Injury Risk (Adult)  Goal: Skin Health and Integrity  Outcome: Ongoing (interventions implemented as appropriate)     Problem: Patient Care Overview  Goal: Plan of Care Review  Outcome: Ongoing (interventions implemented as appropriate)  Goal: Individualization and Mutuality  Outcome: Ongoing (interventions implemented as appropriate)  Goal: Discharge Needs Assessment  Outcome: Ongoing (interventions implemented as appropriate)  Goal: Interprofessional Rounds/Family Conf  Outcome: Ongoing (interventions implemented as  appropriate)

## 2020-04-18 NOTE — PROGRESS NOTES
"   LOS: 22 days   Patient Care Team:  Eric Matta MD as PCP - General (General Practice)    Chief Complaint: legs are achy     Subjective     Patient is lying in bed and appears weak and lethargic.  No new events overnight.    Objective     Vital Signs  Temp:  [97.4 °F (36.3 °C)-98.3 °F (36.8 °C)] 98.3 °F (36.8 °C)  Heart Rate:  [60-64] 62  Resp:  [16-17] 16  BP: (129-155)/(57-74) 155/74    I/O last 3 completed shifts:  In: 236 [P.O.:236]  Out: 3300 [Urine:3300]  I/O this shift:  In: 110 [P.O.:110]  Out: 900 [Urine:900]        Objective:  General Appearance:  Comfortable and in no acute distress.    Vital signs: (most recent): Blood pressure 155/74, pulse 62, temperature 98.3 °F (36.8 °C), temperature source Oral, resp. rate 16, height 165.1 cm (65\"), weight 56 kg (123 lb 7.3 oz), SpO2 96 %.  Vital signs are normal.  No fever.  (BPs better).    Output: Producing urine.    HEENT: (Dried blood around mouth, mucosa looking better  Dressings to bilateral anterior neck access sites)    Lungs:  Normal effort and normal respiratory rate.  Breath sounds clear to auscultation.  She is not in respiratory distress.  (Anteriorly)  Heart: Normal rate.  Regular rhythm.    Abdomen: Abdomen is soft.  Bowel sounds are normal.   There is no abdominal tenderness.     Extremities: There is dependent edema (of all 4 limbs).  (SCDs in place)  Pulses: Distal pulses are intact.    Neurological: Patient is alert.  (Aphasic, can follow instructions, right sided weakness but difficult to quantify).    Pupils:  Pupils are equal, round, and reactive to light.    Skin:  Warm and dry.              Results Review:     I reviewed the patient's new clinical results.  I reviewed the patient's other test results and agree with the interpretation  I personally viewed and interpreted the patient's EKG/Telemetry data  Discussed with pt, RN, CCP, and     Results from last 7 days   Lab Units 04/18/20  0746 04/17/20  0533 04/16/20  0452 " 04/15/20  0659 04/14/20  0816 04/13/20  0523 04/12/20  0706   WBC 10*3/mm3 5.34 4.66 4.81 3.67 4.73 6.32 5.71   HEMOGLOBIN g/dL 10.0* 9.0* 8.9* 9.1* 9.6* 10.7* 10.8*   PLATELETS 10*3/mm3 225 198 218 215 216 254 276     Results from last 7 days   Lab Units 04/18/20  0746 04/17/20 2123 04/17/20  0533 04/16/20 2057 04/16/20  0452 04/15/20  0659 04/14/20  0644 04/13/20  0523 04/12/20  0706   SODIUM mmol/L 146*  --  141  --  144 141 143 152* 158*   POTASSIUM mmol/L 4.3 4.4 3.3*  --  3.7 3.3* 3.8 3.8 3.2*   CHLORIDE mmol/L 105  --  102  --  108* 106 108* 117* 118*   CO2 mmol/L 31.6*  --  29.4*  --  27.0 27.3 26.6 27.2 27.0   BUN mg/dL 20  --  21  --  20 17 19 26* 32*   CREATININE mg/dL 0.97  --  0.93  --  0.92 0.87 0.86 0.98 1.02*   CALCIUM mg/dL 8.7  --  8.3*  --  8.4* 8.4* 8.4* 8.7 9.0   MAGNESIUM mg/dL 1.6  --  1.7  --  1.6 1.6 1.7 2.0 1.7   PHOSPHORUS mg/dL 2.8  --  2.6 2.6 2.1* 2.4* 2.1* 2.2* 2.5   Estimated Creatinine Clearance: 43.6 mL/min (by C-G formula based on SCr of 0.97 mg/dL).    Medication Review: reviewed and adjusted    Assessment/Plan       Upper GI bleed    Hyperlipidemia    Hypertension    Uremic encephalopathy    Acute kidney injury (CMS/HCC)    Acute pancreatitis    Hemorrhagic shock (CMS/HCC)    Thrombocytopenia (CMS/HCC)    Type 2 diabetes mellitus with hyperglycemia (CMS/HCC)    Dementia without behavioral disturbance (CMS/HCC)    Acute posthemorrhagic anemia    1. GI bleed with hemorrhagic shock requiring PRBC transfusion or recurrent hospital stay as well as administration of Kcentra.  Patient underwent EGD which revealed duodenal ulcer and erosive gastritis.  Anticoagulation has been held.  Palliative care was recommended by consultants however patient is not interested as well as  does not want to pursue the same.  On p.o. Protonix which will be continued.  Plan is to go to rehab/ memory care unit upon discharge.    2. Subacute left MCA CVA with aphasia, unfortunately she is not a  candidate for antiplatelet or anticoagulation given the risk of significant bleed.  She continues to have aphasic speech.  She is on a modified diet as per recommended by speech.  Reportedly patient and  did not want any PEG tube placement.  3. Dementia with metabolic encephalopathy has been improving.  4. Leukocytosis has resolved.  5. History of atrial fibrillation, continue with Cardizem and Bystolic.    6. Hypertension, patient is off Cardene drip and will continue with p.o. Cardizem and Bystolic.  On hydralazine as well.    7. Acute kidney injury, patient did undergo hemodialysis during this hospital stay and creatinine has returned to normal now.  Shiley was also removed.    8. Hypokalemia on replacement protocol.  9. Diabetes mellitus, will continue with corrective dose insulin for the moment.    Christos Garcia MD  04/18/20  15:46

## 2020-04-18 NOTE — PLAN OF CARE
Problem: Patient Care Overview  Goal: Plan of Care Review  Outcome: Ongoing (interventions implemented as appropriate)  Flowsheets (Taken 4/18/2020 0327)  Progress: no change  Plan of Care Reviewed With: patient  Outcome Summary: Pt denies pain and SOA. No visual pain or SOA present. Pt responds to name being called. Turned Q2 hours. Pt drank 236cc fluids and 1 ice cream, toelrated well with no coughing or clearing of throat. SCDs in place. Possible d/c to day. VSS. No s/s of distress at this time. Will continue to monitor.      Goal: Individualization and Mutuality  Outcome: Ongoing (interventions implemented as appropriate)  Goal: Discharge Needs Assessment  Outcome: Ongoing (interventions implemented as appropriate)  Goal: Interprofessional Rounds/Family Conf  Outcome: Ongoing (interventions implemented as appropriate)     Problem: Gastrointestinal Bleeding (Adult)  Goal: Signs and Symptoms of Listed Potential Problems Will be Absent, Minimized or Managed (Gastrointestinal Bleeding)  Outcome: Ongoing (interventions implemented as appropriate)     Problem: Skin Injury Risk (Adult)  Goal: Skin Health and Integrity  Outcome: Ongoing (interventions implemented as appropriate)     Problem: Renal Failure/Kidney Injury, Acute (Adult)  Goal: Signs and Symptoms of Listed Potential Problems Will be Absent, Minimized or Managed (Renal Failure/Kidney Injury, Acute)  Outcome: Ongoing (interventions implemented as appropriate)     Problem: Pain, Acute (Adult)  Goal: Acceptable Pain Control/Comfort Level  Outcome: Ongoing (interventions implemented as appropriate)     Problem: Fall Risk (Adult)  Goal: Absence of Fall  Outcome: Ongoing (interventions implemented as appropriate)

## 2020-04-19 LAB
ALBUMIN SERPL-MCNC: 2.3 G/DL (ref 3.5–5.2)
ANION GAP SERPL CALCULATED.3IONS-SCNC: 9.8 MMOL/L (ref 5–15)
BASOPHILS # BLD AUTO: 0.04 10*3/MM3 (ref 0–0.2)
BASOPHILS NFR BLD AUTO: 0.7 % (ref 0–1.5)
BUN BLD-MCNC: 26 MG/DL (ref 8–23)
BUN/CREAT SERPL: 26.5 (ref 7–25)
CALCIUM SPEC-SCNC: 8.6 MG/DL (ref 8.6–10.5)
CHLORIDE SERPL-SCNC: 99 MMOL/L (ref 98–107)
CO2 SERPL-SCNC: 33.2 MMOL/L (ref 22–29)
CREAT BLD-MCNC: 0.98 MG/DL (ref 0.57–1)
DEPRECATED RDW RBC AUTO: 52 FL (ref 37–54)
EOSINOPHIL # BLD AUTO: 0.13 10*3/MM3 (ref 0–0.4)
EOSINOPHIL NFR BLD AUTO: 2.2 % (ref 0.3–6.2)
ERYTHROCYTE [DISTWIDTH] IN BLOOD BY AUTOMATED COUNT: 15.8 % (ref 12.3–15.4)
GFR SERPL CREATININE-BSD FRML MDRD: 55 ML/MIN/1.73
GLUCOSE BLD-MCNC: 209 MG/DL (ref 65–99)
GLUCOSE BLDC GLUCOMTR-MCNC: 194 MG/DL (ref 70–130)
GLUCOSE BLDC GLUCOMTR-MCNC: 227 MG/DL (ref 70–130)
GLUCOSE BLDC GLUCOMTR-MCNC: 280 MG/DL (ref 70–130)
GLUCOSE BLDC GLUCOMTR-MCNC: 321 MG/DL (ref 70–130)
HCT VFR BLD AUTO: 30.5 % (ref 34–46.6)
HGB BLD-MCNC: 10.1 G/DL (ref 12–15.9)
IMM GRANULOCYTES # BLD AUTO: 0.03 10*3/MM3 (ref 0–0.05)
IMM GRANULOCYTES NFR BLD AUTO: 0.5 % (ref 0–0.5)
LYMPHOCYTES # BLD AUTO: 0.86 10*3/MM3 (ref 0.7–3.1)
LYMPHOCYTES NFR BLD AUTO: 14.4 % (ref 19.6–45.3)
MAGNESIUM SERPL-MCNC: 1.5 MG/DL (ref 1.6–2.4)
MCH RBC QN AUTO: 30 PG (ref 26.6–33)
MCHC RBC AUTO-ENTMCNC: 33.1 G/DL (ref 31.5–35.7)
MCV RBC AUTO: 90.5 FL (ref 79–97)
MONOCYTES # BLD AUTO: 0.41 10*3/MM3 (ref 0.1–0.9)
MONOCYTES NFR BLD AUTO: 6.9 % (ref 5–12)
NEUTROPHILS # BLD AUTO: 4.51 10*3/MM3 (ref 1.7–7)
NEUTROPHILS NFR BLD AUTO: 75.3 % (ref 42.7–76)
NRBC BLD AUTO-RTO: 0 /100 WBC (ref 0–0.2)
PHOSPHATE SERPL-MCNC: 2.7 MG/DL (ref 2.5–4.5)
PLATELET # BLD AUTO: 215 10*3/MM3 (ref 140–450)
PMV BLD AUTO: 10.2 FL (ref 6–12)
POTASSIUM BLD-SCNC: 3.9 MMOL/L (ref 3.5–5.2)
RBC # BLD AUTO: 3.37 10*6/MM3 (ref 3.77–5.28)
SODIUM BLD-SCNC: 142 MMOL/L (ref 136–145)
WBC NRBC COR # BLD: 5.98 10*3/MM3 (ref 3.4–10.8)

## 2020-04-19 PROCEDURE — 85025 COMPLETE CBC W/AUTO DIFF WBC: CPT | Performed by: INTERNAL MEDICINE

## 2020-04-19 PROCEDURE — 80069 RENAL FUNCTION PANEL: CPT | Performed by: HOSPITALIST

## 2020-04-19 PROCEDURE — 83735 ASSAY OF MAGNESIUM: CPT | Performed by: NURSE PRACTITIONER

## 2020-04-19 PROCEDURE — 63710000001 INSULIN LISPRO (HUMAN) PER 5 UNITS: Performed by: HOSPITALIST

## 2020-04-19 PROCEDURE — 82962 GLUCOSE BLOOD TEST: CPT

## 2020-04-19 RX ADMIN — INSULIN LISPRO 7 UNITS: 100 INJECTION, SOLUTION INTRAVENOUS; SUBCUTANEOUS at 17:48

## 2020-04-19 RX ADMIN — INSULIN LISPRO 4 UNITS: 100 INJECTION, SOLUTION INTRAVENOUS; SUBCUTANEOUS at 08:33

## 2020-04-19 RX ADMIN — TORSEMIDE 40 MG: 20 TABLET ORAL at 08:33

## 2020-04-19 RX ADMIN — NEBIVOLOL HYDROCHLORIDE 20 MG: 10 TABLET ORAL at 08:33

## 2020-04-19 RX ADMIN — PANTOPRAZOLE SODIUM 40 MG: 40 TABLET, DELAYED RELEASE ORAL at 06:10

## 2020-04-19 RX ADMIN — DILTIAZEM HYDROCHLORIDE 60 MG: 60 TABLET, FILM COATED ORAL at 14:39

## 2020-04-19 RX ADMIN — INSULIN LISPRO 6 UNITS: 100 INJECTION, SOLUTION INTRAVENOUS; SUBCUTANEOUS at 12:00

## 2020-04-19 RX ADMIN — INSULIN LISPRO 2 UNITS: 100 INJECTION, SOLUTION INTRAVENOUS; SUBCUTANEOUS at 21:00

## 2020-04-19 RX ADMIN — HYDRALAZINE HYDROCHLORIDE 25 MG: 25 TABLET, FILM COATED ORAL at 14:39

## 2020-04-19 RX ADMIN — DILTIAZEM HYDROCHLORIDE 60 MG: 60 TABLET, FILM COATED ORAL at 06:11

## 2020-04-19 RX ADMIN — HYDRALAZINE HYDROCHLORIDE 25 MG: 25 TABLET, FILM COATED ORAL at 21:00

## 2020-04-19 RX ADMIN — DILTIAZEM HYDROCHLORIDE 60 MG: 60 TABLET, FILM COATED ORAL at 21:00

## 2020-04-19 RX ADMIN — HYDRALAZINE HYDROCHLORIDE 25 MG: 25 TABLET, FILM COATED ORAL at 06:11

## 2020-04-19 RX ADMIN — PANTOPRAZOLE SODIUM 40 MG: 40 TABLET, DELAYED RELEASE ORAL at 17:48

## 2020-04-19 NOTE — PROGRESS NOTES
"   LOS: 23 days   Patient Care Team:  Eric Matta MD as PCP - General (General Practice)    Chief Complaint: legs are achy     Subjective     Patient is lying in bed and appears weak and lethargic.  No new events overnight.No reports of nausea, vomiting, abdominal pain.    Objective     Vital Signs  Temp:  [97.4 °F (36.3 °C)-99.1 °F (37.3 °C)] 98.1 °F (36.7 °C)  Heart Rate:  [60-78] 78  Resp:  [16-18] 18  BP: (124-171)/(64-80) 128/68    I/O last 3 completed shifts:  In: 783 [P.O.:783]  Out: 2500 [Urine:2500]  I/O this shift:  In: 520 [P.O.:520]  Out: 900 [Urine:900]        Objective:  General Appearance:  Comfortable and in no acute distress.    Vital signs: (most recent): Blood pressure 128/68, pulse 78, temperature 98.1 °F (36.7 °C), temperature source Oral, resp. rate 18, height 165.1 cm (65\"), weight 54.1 kg (119 lb 4.3 oz), SpO2 99 %.  Vital signs are normal.  No fever.  (BPs better).    Output: Producing urine.    HEENT: (Dried blood around mouth, mucosa looking better  Dressings to bilateral anterior neck access sites)    Lungs:  Normal effort and normal respiratory rate.  Breath sounds clear to auscultation.  She is not in respiratory distress.  (Anteriorly)  Heart: Normal rate.  Regular rhythm.    Abdomen: Abdomen is soft.  Bowel sounds are normal.   There is no abdominal tenderness.     Extremities: There is dependent edema (of all 4 limbs).  (SCDs in place)  Pulses: Distal pulses are intact.    Neurological: Patient is alert.  (Aphasic, can follow instructions, right sided weakness but difficult to quantify).    Pupils:  Pupils are equal, round, and reactive to light.    Skin:  Warm and dry.              Results Review:     I reviewed the patient's new clinical results.  I reviewed the patient's other test results and agree with the interpretation  I personally viewed and interpreted the patient's EKG/Telemetry data  Discussed with pt, RN, CCP, and     Results from last 7 days   Lab Units " 04/19/20  0541 04/18/20  0746 04/17/20  0533 04/16/20  0452 04/15/20  0659 04/14/20  0816 04/13/20  0523   WBC 10*3/mm3 5.98 5.34 4.66 4.81 3.67 4.73 6.32   HEMOGLOBIN g/dL 10.1* 10.0* 9.0* 8.9* 9.1* 9.6* 10.7*   PLATELETS 10*3/mm3 215 225 198 218 215 216 254     Results from last 7 days   Lab Units 04/19/20  0541 04/18/20  0746 04/17/20 2123 04/17/20  0533 04/16/20 2057 04/16/20  0452 04/15/20  0659 04/14/20  0644 04/13/20  0523   SODIUM mmol/L 142 146*  --  141  --  144 141 143 152*   POTASSIUM mmol/L 3.9 4.3 4.4 3.3*  --  3.7 3.3* 3.8 3.8   CHLORIDE mmol/L 99 105  --  102  --  108* 106 108* 117*   CO2 mmol/L 33.2* 31.6*  --  29.4*  --  27.0 27.3 26.6 27.2   BUN mg/dL 26* 20  --  21  --  20 17 19 26*   CREATININE mg/dL 0.98 0.97  --  0.93  --  0.92 0.87 0.86 0.98   CALCIUM mg/dL 8.6 8.7  --  8.3*  --  8.4* 8.4* 8.4* 8.7   MAGNESIUM mg/dL 1.5* 1.6  --  1.7  --  1.6 1.6 1.7 2.0   PHOSPHORUS mg/dL 2.7 2.8  --  2.6 2.6 2.1* 2.4* 2.1* 2.2*   Estimated Creatinine Clearance: 41.7 mL/min (by C-G formula based on SCr of 0.98 mg/dL).    Medication Review: reviewed and adjusted    Assessment/Plan       Upper GI bleed    Hyperlipidemia    Hypertension    Uremic encephalopathy    Acute kidney injury (CMS/HCC)    Acute pancreatitis    Hemorrhagic shock (CMS/HCC)    Thrombocytopenia (CMS/HCC)    Type 2 diabetes mellitus with hyperglycemia (CMS/[facility])    Dementia without behavioral disturbance (CMS/[facility])    Acute posthemorrhagic anemia    1. GI bleed with hemorrhagic shock requiring PRBC transfusion earlier during the hospital stay and also received Kcentra.  Patient underwent EGD which revealed duodenal ulcer and erosive gastritis.  Anticoagulation has been held.  Palliative care was recommended by consultants however patient is not interested as well as  does not want to pursue the same.  On p.o. Protonix which will be continued.  Plan is to go to rehab/ memory care unit upon discharge.  Discharged once bed is available.     2. Subacute left MCA CVA with aphasia, unfortunately she is not a candidate for antiplatelet or anticoagulation given the risk of significant bleed.  She continues to have aphasic speech.  She is on a modified diet as per recommended by speech.  Reportedly patient and  did not want any PEG tube placement.  3. Dementia with metabolic encephalopathy has been improving.  4. Leukocytosis has resolved.  5. History of atrial fibrillation, continue with Cardizem and Bystolic.    6. Hypertension, patient is off Cardene drip and will continue with p.o. Cardizem and Bystolic.  On hydralazine as well.    7. Acute kidney injury, patient did undergo hemodialysis during this hospital stay and creatinine has returned to normal now.  Shiley was also removed.    8. Hypokalemia on replacement protocol.  9. Diabetes mellitus, will continue with corrective dose insulin for the moment.    Christos Garcia MD  04/19/20  17:07

## 2020-04-19 NOTE — PLAN OF CARE
No c/o pain, vs stable, doing very well today, drinking from cup (self), NAD, lips cracked/ oral care, turned Q2, BS mgmt      Problem: Fall Risk (Adult)  Goal: Absence of Fall  Outcome: Ongoing (interventions implemented as appropriate)     Problem: Restraint, Nonbehavioral (Nonviolent)  Goal: Rationale and Justification  Outcome: Ongoing (interventions implemented as appropriate)  Goal: Nonbehavioral (Nonviolent) Restraint: Absence of Injury/Harm  Outcome: Ongoing (interventions implemented as appropriate)  Goal: Nonbehavioral (Nonviolent) Restraint: Achievement of Discontinuation Criteria  Outcome: Ongoing (interventions implemented as appropriate)  Goal: Nonbehavioral (Nonviolent) Restraint: Preservation of Dignity and Wellbeing  Outcome: Ongoing (interventions implemented as appropriate)     Problem: Renal Failure/Kidney Injury, Acute (Adult)  Goal: Signs and Symptoms of Listed Potential Problems Will be Absent, Minimized or Managed (Renal Failure/Kidney Injury, Acute)  Outcome: Ongoing (interventions implemented as appropriate)     Problem: Pain, Acute (Adult)  Goal: Acceptable Pain Control/Comfort Level  Outcome: Ongoing (interventions implemented as appropriate)     Problem: Gastrointestinal Bleeding (Adult)  Goal: Signs and Symptoms of Listed Potential Problems Will be Absent, Minimized or Managed (Gastrointestinal Bleeding)  Outcome: Ongoing (interventions implemented as appropriate)     Problem: Skin Injury Risk (Adult)  Goal: Skin Health and Integrity  Outcome: Ongoing (interventions implemented as appropriate)     Problem: Patient Care Overview  Goal: Plan of Care Review  Outcome: Ongoing (interventions implemented as appropriate)  Goal: Individualization and Mutuality  Outcome: Ongoing (interventions implemented as appropriate)  Goal: Discharge Needs Assessment  Outcome: Ongoing (interventions implemented as appropriate)  Goal: Interprofessional Rounds/Family Conf  Outcome: Ongoing (interventions  implemented as appropriate)

## 2020-04-19 NOTE — PLAN OF CARE
Pt resting in bed quietly. Turned every 2 hours. Patient alert and able to follow commands. Denies pain. Will continue to monitor.       Problem: Fall Risk (Adult)  Goal: Absence of Fall  Outcome: Ongoing (interventions implemented as appropriate)     Problem: Restraint, Nonbehavioral (Nonviolent)  Goal: Rationale and Justification  Outcome: Ongoing (interventions implemented as appropriate)  Goal: Nonbehavioral (Nonviolent) Restraint: Absence of Injury/Harm  Outcome: Ongoing (interventions implemented as appropriate)  Goal: Nonbehavioral (Nonviolent) Restraint: Achievement of Discontinuation Criteria  Outcome: Ongoing (interventions implemented as appropriate)  Goal: Nonbehavioral (Nonviolent) Restraint: Preservation of Dignity and Wellbeing  Outcome: Ongoing (interventions implemented as appropriate)     Problem: Renal Failure/Kidney Injury, Acute (Adult)  Goal: Signs and Symptoms of Listed Potential Problems Will be Absent, Minimized or Managed (Renal Failure/Kidney Injury, Acute)  Outcome: Ongoing (interventions implemented as appropriate)     Problem: Pain, Acute (Adult)  Goal: Acceptable Pain Control/Comfort Level  Outcome: Ongoing (interventions implemented as appropriate)     Problem: Gastrointestinal Bleeding (Adult)  Goal: Signs and Symptoms of Listed Potential Problems Will be Absent, Minimized or Managed (Gastrointestinal Bleeding)  Outcome: Ongoing (interventions implemented as appropriate)     Problem: Skin Injury Risk (Adult)  Goal: Skin Health and Integrity  Outcome: Ongoing (interventions implemented as appropriate)     Problem: Patient Care Overview  Goal: Plan of Care Review  Outcome: Ongoing (interventions implemented as appropriate)  Goal: Individualization and Mutuality  Outcome: Ongoing (interventions implemented as appropriate)  Goal: Discharge Needs Assessment  Outcome: Ongoing (interventions implemented as appropriate)  Goal: Interprofessional Rounds/Family Conf  Outcome: Ongoing  (interventions implemented as appropriate)

## 2020-04-20 VITALS
DIASTOLIC BLOOD PRESSURE: 74 MMHG | OXYGEN SATURATION: 94 % | HEART RATE: 64 BPM | BODY MASS INDEX: 19.25 KG/M2 | WEIGHT: 115.52 LBS | HEIGHT: 65 IN | SYSTOLIC BLOOD PRESSURE: 153 MMHG | TEMPERATURE: 98.2 F | RESPIRATION RATE: 18 BRPM

## 2020-04-20 LAB
ALBUMIN SERPL-MCNC: 2.6 G/DL (ref 3.5–5.2)
ANION GAP SERPL CALCULATED.3IONS-SCNC: 10.5 MMOL/L (ref 5–15)
BASOPHILS # BLD AUTO: 0.04 10*3/MM3 (ref 0–0.2)
BASOPHILS NFR BLD AUTO: 0.7 % (ref 0–1.5)
BUN BLD-MCNC: 27 MG/DL (ref 8–23)
BUN/CREAT SERPL: 28.1 (ref 7–25)
CALCIUM SPEC-SCNC: 8.7 MG/DL (ref 8.6–10.5)
CHLORIDE SERPL-SCNC: 101 MMOL/L (ref 98–107)
CO2 SERPL-SCNC: 34.5 MMOL/L (ref 22–29)
CREAT BLD-MCNC: 0.96 MG/DL (ref 0.57–1)
DEPRECATED RDW RBC AUTO: 50.1 FL (ref 37–54)
EOSINOPHIL # BLD AUTO: 0.15 10*3/MM3 (ref 0–0.4)
EOSINOPHIL NFR BLD AUTO: 2.6 % (ref 0.3–6.2)
ERYTHROCYTE [DISTWIDTH] IN BLOOD BY AUTOMATED COUNT: 15.7 % (ref 12.3–15.4)
GFR SERPL CREATININE-BSD FRML MDRD: 57 ML/MIN/1.73
GLUCOSE BLD-MCNC: 244 MG/DL (ref 65–99)
GLUCOSE BLDC GLUCOMTR-MCNC: 264 MG/DL (ref 70–130)
GLUCOSE BLDC GLUCOMTR-MCNC: 327 MG/DL (ref 70–130)
HCT VFR BLD AUTO: 30.1 % (ref 34–46.6)
HGB BLD-MCNC: 9.9 G/DL (ref 12–15.9)
IMM GRANULOCYTES # BLD AUTO: 0.03 10*3/MM3 (ref 0–0.05)
IMM GRANULOCYTES NFR BLD AUTO: 0.5 % (ref 0–0.5)
LYMPHOCYTES # BLD AUTO: 0.91 10*3/MM3 (ref 0.7–3.1)
LYMPHOCYTES NFR BLD AUTO: 15.6 % (ref 19.6–45.3)
MAGNESIUM SERPL-MCNC: 1.5 MG/DL (ref 1.6–2.4)
MCH RBC QN AUTO: 29.2 PG (ref 26.6–33)
MCHC RBC AUTO-ENTMCNC: 32.9 G/DL (ref 31.5–35.7)
MCV RBC AUTO: 88.8 FL (ref 79–97)
MONOCYTES # BLD AUTO: 0.38 10*3/MM3 (ref 0.1–0.9)
MONOCYTES NFR BLD AUTO: 6.5 % (ref 5–12)
NEUTROPHILS # BLD AUTO: 4.33 10*3/MM3 (ref 1.7–7)
NEUTROPHILS NFR BLD AUTO: 74.1 % (ref 42.7–76)
NRBC BLD AUTO-RTO: 0 /100 WBC (ref 0–0.2)
PHOSPHATE SERPL-MCNC: 2.5 MG/DL (ref 2.5–4.5)
PLATELET # BLD AUTO: 232 10*3/MM3 (ref 140–450)
PMV BLD AUTO: 10.2 FL (ref 6–12)
POTASSIUM BLD-SCNC: 3.7 MMOL/L (ref 3.5–5.2)
RBC # BLD AUTO: 3.39 10*6/MM3 (ref 3.77–5.28)
SODIUM BLD-SCNC: 146 MMOL/L (ref 136–145)
WBC NRBC COR # BLD: 5.84 10*3/MM3 (ref 3.4–10.8)

## 2020-04-20 PROCEDURE — 85025 COMPLETE CBC W/AUTO DIFF WBC: CPT | Performed by: INTERNAL MEDICINE

## 2020-04-20 PROCEDURE — 63710000001 INSULIN LISPRO (HUMAN) PER 5 UNITS: Performed by: HOSPITALIST

## 2020-04-20 PROCEDURE — 80069 RENAL FUNCTION PANEL: CPT | Performed by: HOSPITALIST

## 2020-04-20 PROCEDURE — 82962 GLUCOSE BLOOD TEST: CPT

## 2020-04-20 PROCEDURE — 97110 THERAPEUTIC EXERCISES: CPT

## 2020-04-20 PROCEDURE — 83735 ASSAY OF MAGNESIUM: CPT | Performed by: NURSE PRACTITIONER

## 2020-04-20 RX ORDER — DILTIAZEM HYDROCHLORIDE 60 MG/1
60 TABLET, FILM COATED ORAL EVERY 8 HOURS SCHEDULED
Qty: 90 TABLET | Refills: 0
Start: 2020-04-20 | End: 2020-05-20

## 2020-04-20 RX ORDER — PANTOPRAZOLE SODIUM 40 MG/1
40 TABLET, DELAYED RELEASE ORAL
Qty: 60 TABLET | Refills: 0
Start: 2020-04-20 | End: 2020-05-20

## 2020-04-20 RX ORDER — GABAPENTIN 100 MG/1
100 CAPSULE ORAL 2 TIMES DAILY
Qty: 60 CAPSULE | Refills: 1 | Status: SHIPPED | OUTPATIENT
Start: 2020-04-20 | End: 2020-04-23

## 2020-04-20 RX ORDER — IPRATROPIUM BROMIDE AND ALBUTEROL SULFATE 2.5; .5 MG/3ML; MG/3ML
3 SOLUTION RESPIRATORY (INHALATION)
Qty: 360 ML | Refills: 0
Start: 2020-04-20 | End: 2020-05-20

## 2020-04-20 RX ORDER — TORSEMIDE 20 MG/1
40 TABLET ORAL DAILY
Qty: 60 TABLET | Refills: 0
Start: 2020-04-21 | End: 2020-05-21

## 2020-04-20 RX ORDER — HYDRALAZINE HYDROCHLORIDE 25 MG/1
25 TABLET, FILM COATED ORAL EVERY 8 HOURS SCHEDULED
Qty: 90 TABLET | Refills: 0
Start: 2020-04-20 | End: 2020-05-20

## 2020-04-20 RX ADMIN — HYDRALAZINE HYDROCHLORIDE 25 MG: 25 TABLET, FILM COATED ORAL at 06:12

## 2020-04-20 RX ADMIN — PANTOPRAZOLE SODIUM 40 MG: 40 TABLET, DELAYED RELEASE ORAL at 18:12

## 2020-04-20 RX ADMIN — NEBIVOLOL HYDROCHLORIDE 20 MG: 10 TABLET ORAL at 09:20

## 2020-04-20 RX ADMIN — TORSEMIDE 40 MG: 20 TABLET ORAL at 09:20

## 2020-04-20 RX ADMIN — DILTIAZEM HYDROCHLORIDE 60 MG: 60 TABLET, FILM COATED ORAL at 14:37

## 2020-04-20 RX ADMIN — HYDRALAZINE HYDROCHLORIDE 25 MG: 25 TABLET, FILM COATED ORAL at 01:59

## 2020-04-20 RX ADMIN — INSULIN LISPRO 6 UNITS: 100 INJECTION, SOLUTION INTRAVENOUS; SUBCUTANEOUS at 18:12

## 2020-04-20 RX ADMIN — DILTIAZEM HYDROCHLORIDE 60 MG: 60 TABLET, FILM COATED ORAL at 06:12

## 2020-04-20 RX ADMIN — INSULIN LISPRO 2 UNITS: 100 INJECTION, SOLUTION INTRAVENOUS; SUBCUTANEOUS at 09:18

## 2020-04-20 RX ADMIN — PANTOPRAZOLE SODIUM 40 MG: 40 TABLET, DELAYED RELEASE ORAL at 06:12

## 2020-04-20 NOTE — THERAPY TREATMENT NOTE
Patient Name: Darby Workman  : 1945    MRN: [id number]                              Today's Date: 2020       Admit Date: 3/27/2020    Visit Dx:     ICD-10-CM ICD-9-CM   1. Upper GI bleed K92.2 578.9   2. Acute renal failure, unspecified acute renal failure type (CMS/HCC) N17.9 584.9   3. Elevated LFTs R94.5 790.6   4. Hyperkalemia E87.5 276.7     Patient Active Problem List   Diagnosis   • Hypertensive crisis   • Diabetes mellitus (CMS/[facility])   • Hyperlipidemia   • Hypertension   • Elevated troponin   • Acute cerebrovascular accident (CVA) of cerebellum (CMS/[facility])   • Right-sided headache   • Acute ischemic stroke (CMS/[facility])   • Embolic stroke (CMS/[facility])   • Anticoagulated by anticoagulation treatment   • Stroke (cerebrum) (CMS/[facility])   • Dysthymic disorder   • Hypotension   • Upper GI bleed   • Uremic encephalopathy   • Acute kidney injury (CMS/[facility])   • Acute pancreatitis   • Hemorrhagic shock (CMS/[facility])   • Thrombocytopenia (CMS/[facility])   • Type 2 diabetes mellitus with hyperglycemia (CMS/[facility])   • Dementia without behavioral disturbance (CMS/[facility])   • Acute posthemorrhagic anemia     Past Medical History:   Diagnosis Date   • Acute cerebrovascular accident (CVA) of cerebellum (CMS/[facility])    • Acute ischemic stroke (CMS/[facility])    • Arthritis    • Coronary artery disease    • CVA (cerebral vascular accident) (CMS/[facility])    • Diabetes mellitus (CMS/[facility])    • Heart attack (CMS/[facility])    • History of blood clots    • Hyperlipidemia    • Hypertension    • Vision loss      Past Surgical History:   Procedure Laterality Date   • CAROTID ENDARTERECTOMY Left 2019    Procedure: Left carotid endarterectomy;  Surgeon: Rupert Oliva MD;  [facility];  Service: Neurosurgery   • EMBOLECTOMY Left 2019    Procedure: CEREBRAL ANGIOGRAM, LEFT INTERNAL CAROTID ARTERY STENT;  Surgeon: Rupert Oliva MD;  [facility] ;  Service: Neurosurgery   • ENDOSCOPY N/A 3/28/2020    Procedure:  "ESOPHAGOGASTRODUODENOSCOPY AT BEDSIDE WITH EPI INJECTION AND GOLD PROBE CAUTERIZATION;  Surgeon: Malathi Bright MD;  Location: [facility] ENDOSCOPY;  Service: Gastroenterology;  Laterality: N/A;  PRE GI BLEED  POST EROSIVE GASTRITIS, DUODENAL ULCER WITH CLOT     General Information     Row Name 04/20/20 1009          PT Evaluation Time/Intention    Document Type  therapy note (daily note)  -PH     Mode of Treatment  physical therapy  -PH     Row Name 04/20/20 1009          Cognitive Assessment/Intervention- PT/OT    Cognitive Assessment/Intervention Comment  Pt responds w/ yes/no  -PH     Row Name 04/20/20 1009          Safety Issues, Functional Mobility    Safety Issues Affecting Function (Mobility)  ability to follow commands;insight into deficits/self awareness  -PH     Impairments Affecting Function (Mobility)  balance;endurance/activity tolerance;strength;motor control  -PH       User Key  (r) = Recorded By, (t) = Taken By, (c) = Cosigned By    Initials Name Provider Type    PH [place]ler, Heidi, PTA Physical Therapy Assistant        Mobility     Row Name 04/20/20 1010          Bed Mobility Assessment/Treatment    Bed Mobility Assessment/Treatment  supine-sit  -PH     Supine-Sit [place] (Bed Mobility)  moderate assist (50% patient effort);verbal cues;nonverbal cues (demo/gesture)  -PH     Sit-Supine [place] (Bed Mobility)  moderate assist (50% patient effort);2 person assist;verbal cues  -PH     Comment (Bed Mobility)  Pt agreeable to s>s, but limited participation after; Pt groaning from pain w/ movement of RLE  -PH     Row Name 04/20/20 1010          Transfer Assessment/Treatment    Comment (Transfers)  STS x 2; 1st trial HHA x 2  /  2nd trail fww; pt req placement of R hand on . Attempt made to stand pt w/ pt resisting and pushing backward to return to EOB; When asked if she was afraid of falling, pt stated, \"yes\".   -PH     Row Name 04/20/20 1010          Sit-Stand Transfer    Sit-Stand " "[place] (Transfers)  dependent (less than 25% patient effort);2 person assist;verbal cues;nonverbal cues (demo/gesture)  -PH     Assistive Device (Sit-Stand Transfers)  other (see comments);walker, front-wheeled HHA x 2  -PH     Row Name 04/20/20 1010          Gait/Stairs Assessment/Training    [place] Level (Gait)  not tested  -PH     Comment (Gait/Stairs)  NT - not approp at this time  -PH       User Key  (r) = Recorded By, (t) = Taken By, (c) = Cosigned By    Initials Name Provider Type     Heidi Chatterjee PTA Physical Therapy Assistant        Obj/Interventions     Row Name 04/20/20 1015          Therapeutic Exercise    Comment (Therapeutic Exercise)  Pt refused attempts made for TE; Pt kept repeating, \"no\" when asked to perform an exercise.   -[facility] Name 04/20/20 1015          Static Sitting Balance    Level of [place] (Unsupported Sitting, Static Balance)  minimal assist, 75% patient effort;contact guard assist  -PH     Sitting Position (Unsupported Sitting, Static Balance)  sitting on edge of bed  -PH     Time Able to Maintain Position (Unsupported Sitting, Static Balance)  more than 5 minutes  -PH     Comment (Unsupported Sitting, Static Balance)  Pt often L leaning and attempting to get to pillow/bed  -PH     [facility] Name 04/20/20 1015          Dynamic Sitting Balance    Comment, Reaches Outside Midline (Sitting, Dynamic Balance)  Pt stating, \"no\" when attempts made  -PH       User Key  (r) = Recorded By, (t) = Taken By, (c) = Cosigned By    Initials Name Provider Type     Heidi Chatterjee PTA Physical Therapy Assistant        Goals/Plan    No documentation.       Clinical Impression     Row Name 04/20/20 1016          Pain Assessment    Additional Documentation  Pain Scale: FACES Pre/Post-Treatment (Group)  -[facility] Name 04/20/20 1016          Pain Scale: Numbers Pre/Post-Treatment    Pain Intervention(s)  Repositioned;Rest  -PH     Row Name 04/20/20 1016          Pain " Scale: FACES Pre/Post-Treatment    Pain: FACES Scale, Pretreatment  4-->hurts little more  -PH     Pain: FACES Scale, Post-Treatment  6-->hurts even more  -PH     Row Name 04/20/20 1016          Plan of Care Review    Plan of Care Reviewed With  patient  -PH     Outcome Summary  Pt initially agreeable to PT w/ decreasing participation as session continued. Pt performed s>s req mod A x 1 and s<s req mod A /max A x 2. Pt sat at EOB w/ CGA/min A, but was backing leaning and resisting attempts to stand today. PT will continue to progress as pt tolerates.   -PH     Row Name 04/20/20 1016          Vital Signs    O2 Delivery Pre Treatment  room air  -PH     O2 Delivery Intra Treatment  room air  -PH     O2 Delivery Post Treatment  room air  -PH     Row Name 04/20/20 1016          Positioning and Restraints    Pre-Treatment Position  in bed  -PH     Post Treatment Position  bed  -PH     In Bed  with nsg;call light within reach;encouraged to call for assist;exit alarm on  -PH       User Key  (r) = Recorded By, (t) = Taken By, (c) = Cosigned By    Initials Name Provider Type    PH Heidi Chatterjee PTA Physical Therapy Assistant        Outcome Measures     Row Name 04/20/20 1019          How much help from another person do you currently need...    Turning from your back to your side while in flat bed without using bedrails?  2  -PH     Moving from lying on back to sitting on the side of a flat bed without bedrails?  2  -PH     Moving to and from a bed to a chair (including a wheelchair)?  1  -PH     Standing up from a chair using your arms (e.g., wheelchair, bedside chair)?  1  -PH     Climbing 3-5 steps with a railing?  1  -PH     To walk in hospital room?  1  -PH     AM-PAC 6 Clicks Score (PT)  8  -PH     Row Name 04/20/20 1019          Functional Assessment    Outcome Measure Options  AM-PAC 6 Clicks Basic Mobility (PT)  -PH       User Key  (r) = Recorded By, (t) = Taken By, (c) = Cosigned By    Initials Name  Provider Type    Heidi Parker PTA Physical Therapy Assistant          PT Recommendation and Plan     Outcome Summary/Treatment Plan (PT)  Anticipated Discharge Disposition (PT): skilled nursing facility, extended care facility  Plan of Care Reviewed With: patient  Outcome Summary: Pt initially agreeable to PT w/ decreasing participation as session continued. Pt performed s>s req mod A x 1 and s<s req mod A /max A x 2. Pt sat at EOB w/ CGA/min A, but was backing leaning and resisting attempts to stand today. PT will continue to progress as pt tolerates.      Time Calculation:   PT Charges     Row Name 04/20/20 1020             Time Calculation    Start Time  0909  -PH      Stop Time  0921  -PH      Time Calculation (min)  12 min  -PH      PT Received On  04/20/20  -PH      PT - Next Appointment  04/21/20  -PH        User Key  (r) = Recorded By, (t) = Taken By, (c) = Cosigned By    Initials Name Provider Type     Heidi Chatterjee PTA Physical Therapy Assistant        Therapy Charges for Today     Code Description Service Date Service Provider Modifiers Qty    71606753630 HC PT THER PROC EA 15 MIN 4/20/2020 Heidi Chatterjee PTA GP 1    88309121731 HC PT THER SUPP EA 15 MIN 4/20/2020 Heidi Chatterjee PTA GP 1          PT G-Codes  Outcome Measure Options: AM-PAC 6 Clicks Basic Mobility (PT)  AM-PAC 6 Clicks Score (PT): 8  AM-PAC 6 Clicks Score (OT): 9    Heidi Chatterjee PTA  4/20/2020

## 2020-04-20 NOTE — PROGRESS NOTES
Continued Stay Note  [facility]     Patient Name: Darby Workman  MRN: [id number]  Today's Date: 4/20/2020    Admit Date: 3/27/2020    Discharge Plan     Row Name 04/20/20 1213       Plan    Plan  [facility] cert approved    Plan Comments  Plan remains to dc [facility]. Anita cert approved per Dagmar/Signature. Transfer packet in [facility].         Discharge Codes    No documentation.             Collette Oneill RN

## 2020-04-20 NOTE — PROGRESS NOTES
Case Management Discharge Note      Final Note: D/c to [facility] via [facility], Dagmar/Signature notified. Pt's spouse notified of dc and dc IMM by phone Spouse/Ajit [phone].     Provided Post Acute Provider List?: Refused  Provided Post Acute Provider Quality & Resource List?: Refused  Refused Quality and Resource List Comment: Pt from [facility] and wishes to return.    Destination - Selection Complete      Service Provider Request Status Selected Services Address Phone Number Fax Number    [facility] Selected Intermediate Care [address] [place] [address] [phone]      Durable Medical Equipment      No service has been selected for the patient.      Dialysis/Infusion      No service has been selected for the patient.      Home Medical Care      No service has been selected for the patient.      Therapy      No service has been selected for the patient.      Community Resources      No service has been selected for the patient.        Transportation Services  Ambulance: [facility]    Final Discharge Disposition Code: 03 - skilled nursing facility (SNF)

## 2020-04-20 NOTE — PLAN OF CARE
Problem: Patient Care Overview  Goal: Plan of Care Review  Outcome: Ongoing (interventions implemented as appropriate)  Flowsheets (Taken 4/20/2020 1016)  Plan of Care Reviewed With: patient  Outcome Summary: Pt initially agreeable to PT w/ decreasing participation as session continued. Pt performed s>s req mod A x 1 and s<s req mod A /max A x 2. Pt sat at EOB w/ CGA/min A, but was backing leaning and resisting attempts to stand today. PT will continue to progress as pt tolerates.

## 2020-04-20 NOTE — PLAN OF CARE
Problem: Patient Care Overview  Goal: Plan of Care Review  Outcome: Ongoing (interventions implemented as appropriate)  Flowsheets (Taken 4/20/2020 0320)  Progress: no change  Plan of Care Reviewed With: patient  Outcome Summary: Pt denies pain and SOA. Pt responds when name is stated. Pt answers yes and no questions. Medications administered per orders. Q2 turn. Catheter care complete, tolerated well. Pt drinking nectar thicken liquids on her own. Possible d/c today. VSS. No s/s of distress at this time. Will continue to monitor.    Goal: Individualization and Mutuality  Outcome: Ongoing (interventions implemented as appropriate)  Goal: Discharge Needs Assessment  Outcome: Ongoing (interventions implemented as appropriate)  Goal: Interprofessional Rounds/Family Conf  Outcome: Ongoing (interventions implemented as appropriate)     Problem: Fall Risk (Adult)  Goal: Absence of Fall  Outcome: Ongoing (interventions implemented as appropriate)     Problem: Renal Failure/Kidney Injury, Acute (Adult)  Goal: Signs and Symptoms of Listed Potential Problems Will be Absent, Minimized or Managed (Renal Failure/Kidney Injury, Acute)  Outcome: Ongoing (interventions implemented as appropriate)     Problem: Pain, Acute (Adult)  Goal: Acceptable Pain Control/Comfort Level  Outcome: Ongoing (interventions implemented as appropriate)     Problem: Gastrointestinal Bleeding (Adult)  Goal: Signs and Symptoms of Listed Potential Problems Will be Absent, Minimized or Managed (Gastrointestinal Bleeding)  Outcome: Ongoing (interventions implemented as appropriate)     Problem: Skin Injury Risk (Adult)  Goal: Skin Health and Integrity  Outcome: Ongoing (interventions implemented as appropriate)

## 2020-04-20 NOTE — DISCHARGE SUMMARY
NAME: Darby Workman ADMIT: 3/27/2020   : 1945  PCP: Eric Matta MD    MRN: [id number] LOS: 24 days   AGE/SEX: 75 y.o. female  ROOM: E668/     Date of Admission:  3/27/2020  Date of Discharge:  2020    PCP: Eric Matta MD    CHIEF COMPLAINT  Altered Mental Status and Black or Bloody Stool      DISCHARGE DIAGNOSIS  Active Hospital Problems    Diagnosis  POA   • **Upper GI bleed [K92.2]  Yes   • Uremic encephalopathy [G93.41, N19]  Yes   • Acute kidney injury (CMS/HCC) [N17.9]  Yes   • Acute pancreatitis [K85.90]  Yes   • Hemorrhagic shock (CMS/HCC) [R57.8]  Yes   • Thrombocytopenia (CMS/HCC) [D69.6]  Yes   • Type 2 diabetes mellitus with hyperglycemia (CMS/HCC) [E11.65]  Yes   • Dementia without behavioral disturbance (CMS/HCC) [F03.90]  Yes   • Acute posthemorrhagic anemia [D62]  Yes   • Hypertension [I10]  Yes   • Hyperlipidemia [E78.5]  Yes      Resolved Hospital Problems   No resolved problems to display.       SECONDARY DIAGNOSES  Past Medical History:   Diagnosis Date   • Acute cerebrovascular accident (CVA) of cerebellum (CMS/HCC)    • Acute ischemic stroke (CMS/HCC)    • Arthritis    • Coronary artery disease    • CVA (cerebral vascular accident) (CMS/HCC)    • Diabetes mellitus (CMS/HCC)    • Heart attack (CMS/HCC)    • History of blood clots    • Hyperlipidemia    • Hypertension    • Vision loss        CONSULTS       HOSPITAL COURSE  Mrs. Workman is a 74yo WF brought to ER today for hematochezia per nursing home staff.  She has been more confused than usual and less alert.  History is obtained from the chart due to patient's mental status.  She is on anticoagulation with both eliquis and plavix.    1. GI bleed with hemorrhagic shock requiring PRBC transfusion earlier during the hospital stay and also received Kcentra.  Patient underwent EGD which revealed duodenal ulcer and erosive gastritis.  Anticoagulation has been held.  Palliative care was recommended by consultants however  patient is not interested as well as  does not want to pursue the same.  On p.o. Protonix which will be continued.   Discharged once bed is available.  Of note patient's long-term prognosis to be poor.   2. Subacute left MCA CVA with aphasia, unfortunately she is not a candidate for antiplatelet or anticoagulation given the risk of significant bleed.  She continues to have aphasic speech.  She is on a modified diet as per recommended by speech.  Reportedly patient and  did not want any PEG tube placement.  3. Dementia with metabolic encephalopathy has been improving.  4. Leukocytosis has resolved.  5. History of atrial fibrillation, continue with Cardizem and Bystolic.    6. Hypertension, patient is off Cardene drip and will continue with p.o. Cardizem, Bystolic, hydralazine.    7. Acute kidney injury, patient did undergo hemodialysis during this hospital stay and creatinine has returned to normal now.  Shiley was also removed.    8. Hypokalemia on replacement protocol.  9. Diabetes mellitus, will continue with corrective dose insulin for the moment.  P.o. intake has been poor.  Her oral hypoglycemic agents have been discontinued.    Please note patient was seen and examined today on day of discharge.  Time taken to discharge 45 minutes.      CONDITION ON DISCHARGE  Stable.      DISCHARGE DISPOSITION   Skilled Nursing Facility (DC - External)      DISCHARGE MEDICATIONS       Your medication list      START taking these medications      Instructions Last Dose Given Next Dose Due   dilTIAZem 60 MG tablet  Commonly known as:  CARDIZEM      Take 1 tablet by mouth Every 8 (Eight) Hours for 30 days.       ipratropium-albuterol 0.5-2.5 mg/3 ml nebulizer  Commonly known as:  DUO-NEB      Take 3 mL by nebulization Every 2 (Two) Hours As Needed for Wheezing or Shortness of Air for up to 30 days.       torsemide 20 MG tablet  Commonly known as:  DEMADEX  Start taking on:  April 21, 2020      Take 2 tablets by  mouth Daily for 30 days.          CHANGE how you take these medications      Instructions Last Dose Given Next Dose Due   hydrALAZINE 25 MG tablet  Commonly known as:  APRESOLINE  What changed:    · medication strength  · how much to take      Take 1 tablet by mouth Every 8 (Eight) Hours for 30 days.       pantoprazole 40 MG EC tablet  Commonly known as:  PROTONIX  What changed:  when to take this      Take 1 tablet by mouth 2 (Two) Times a Day Before Meals for 30 days.          CONTINUE taking these medications      Instructions Last Dose Given Next Dose Due   acetaminophen 325 MG tablet  Commonly known as:  TYLENOL      Take 650 mg by mouth Every 6 (Six) Hours As Needed for Mild Pain .       aluminum-magnesium hydroxide-simethicone 400-400-40 MG/5ML suspension  Commonly known as:  MAALOX MAX      Take 15 mL by mouth Every 6 (Six) Hours As Needed for Indigestion or Heartburn.       atorvastatin 80 MG tablet  Commonly known as:  LIPITOR      Take 1 tablet by mouth Every Night.       brimonidine 0.2 % ophthalmic solution  Commonly known as:  ALPHAGAN      Administer 1 drop to both eyes 2 (Two) Times a Day.       dextromethorphan-quinidine 20-10 MG capsule capsule  Commonly known as:  NUEDEXTA      Take 1 capsule by mouth 2 (Two) Times a Day.       dorzolamide 2 % ophthalmic solution  Commonly known as:  TRUSOPT      Administer 1 drop to both eyes 2 (Two) Times a Day.       ferrous sulfate 325 (65 FE) MG tablet      Take 325 mg by mouth Daily With Breakfast.       gabapentin 100 MG capsule  Commonly known as:  NEURONTIN      Take 1 capsule by mouth 2 (Two) Times a Day for 3 days. Indications: anxiety       insulin lispro 100 UNIT/ML injection  Commonly known as:  humaLOG      Inject 0-7 Units under the skin into the appropriate area as directed 4 (Four) Times a Day With Meals & at Bedtime.       melatonin 3 MG tablet      Take 1 tablet by mouth Every Night.       nebivolol 20 MG tablet  Commonly known as:  BYSTOLIC       Take 1 tablet by mouth 2 (Two) Times a Day.       nitroglycerin 0.4 MG SL tablet  Commonly known as:  NITROSTAT      Place 1 tablet under the tongue Every 5 (Five) Minutes As Needed for Chest Pain (Systolic BP Greater Than 100). Max 3 doses in 15 minutes.       PARoxetine 10 MG tablet  Commonly known as:  PAXIL      Take 1 tablet by mouth Daily.       vitamin D 1.25 MG (45613 UT) capsule capsule  Commonly known as:  ERGOCALCIFEROL      Take 50,000 Units by mouth 1 (One) Time Per Week.          STOP taking these medications    amLODIPine 5 MG tablet  Commonly known as:  NORVASC        apixaban 5 MG tablet tablet  Commonly known as:  ELIQUIS        aspirin 325 MG tablet        clopidogrel 75 MG tablet  Commonly known as:  PLAVIX        glipizide 5 MG tablet  Commonly known as:  GLUCOTROL        LORazepam 0.5 MG tablet  Commonly known as:  ATIVAN        losartan 100 MG tablet  Commonly known as:  COZAAR        losartan 50 MG tablet 100 mg, hydrochlorothiazide 12.5 MG capsule 12.5 mg        metFORMIN 1000 MG tablet  Commonly known as:  GLUCOPHAGE        naproxen 500 MG tablet  Commonly known as:  NAPROSYN        OLANZapine 2.5 MG tablet  Commonly known as:  zyPREXA        potassium chloride 20 MEQ packet  Commonly known as:  KLOR-CON              Where to Get Your Medications      You can get these medications from any pharmacy    Bring a paper prescription for each of these medications  · gabapentin 100 MG capsule     Information about where to get these medications is not yet available    Ask your nurse or doctor about these medications  · dilTIAZem 60 MG tablet  · hydrALAZINE 25 MG tablet  · ipratropium-albuterol 0.5-2.5 mg/3 ml nebulizer  · pantoprazole 40 MG EC tablet  · torsemide 20 MG tablet       Diet Instructions     Diet: Consistent Carbohydrate      Discharge Diet:  Consistent Carbohydrate       No future appointments.  Additional Instructions for the Follow-ups that You Need to Schedule     Discharge  Follow-up with PCP   As directed       Currently Documented PCP:    Eric Matta MD    PCP Phone Number:    [phone]     Follow Up Details:  2 weeks            Contact information for follow-up providers     Eric Matta MD .    Specialty:  General Practice  Why:  2 weeks  Contact information:  [address] [address]  [phone]                   Contact information for after-discharge care     Destination     [facility]    Service:  Intermediate Care  Contact information:  [address] [facility] [address] [phone]                             TEST  RESULTS PENDING AT DISCHARGE         Christos Garcia MD  [facility]  04/20/20  15:03

## 2020-07-27 NOTE — THERAPY TREATMENT NOTE
Inpatient Rehabilitation - Physical Therapy Treatment Note  [facility]     Patient Name: Darby Workman  : 1945  MRN: [id number]    Today's Date: 2019                 Admit Date: 2019      Visit Dx:    No diagnosis found.    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)   • Right-sided headache   • Acute ischemic stroke (CMS/HCC)   • Embolic stroke (CMS/HCC)   • Cerebral infarction due to stenosis of left carotid artery (CMS/HCC)   • Anticoagulated by anticoagulation treatment   • Stroke (cerebrum) (CMS/HCC)       Therapy Treatment    IRF Treatment Summary     Row Name 19 1044 19 0930          Evaluation/Treatment Time and Intent    Subjective Information  no complaints  -MD  no complaints  -KB     Existing Precautions/Restrictions  fall  -MD  fall swallow, communication  -KB     Document Type  therapy note (daily note)  -MD  therapy note (daily note)  -KB     Mode of Treatment  physical therapy  -MD  speech-language pathology  -KB     Patient/Family Observations  Pt up in chair.  Daughter present through out session.  -MD  seated in wc in her room with daughter present  -KB     Start Time (Evaluation/Treatment)  --  0930  -KB     Stop Time (Evaluation/Treatment)  --  1000  -KB     Recorded by [MD] Mira Chadwick, PT [KB] Bruton, Katherine L     Row Name 19 1044             Cognition/Psychosocial- PT/OT    Orientation Status (Cognition)  unable/difficult to assess  -MD      Follows Commands (Cognition)  follows one step commands;25-49% accuracy;physical/tactile prompts required;repetition of directions required;verbal cues/prompting required  -MD      Personal Safety Interventions  fall prevention program maintained;gait belt  -MD      Recorded by [MD] Mira Chadwick, PT      Row Name 19 1044             Bed  Mobility Assessment/Treatment    Sit-Supine [place] (Bed Mobility)  supervision  -MD      Recorded by [MD] Mira Chadwick, PT      Row Name 08/09/19 1044             Chair-Bed Transfer    Chair-Bed [place] (Transfers)  contact guard  -MD      Recorded by [MD] Miar Chadwick PT      Row Name 08/09/19 1044             Sit-Stand Transfer    Sit-Stand [place] (Transfers)  contact guard  -MD      Recorded by [MD] Mira Chadwick PT      Row Name 08/09/19 1044             Stand-Sit Transfer    Stand-Sit [place] (Transfers)  contact guard  -MD      Recorded by [MD] Mira Chadwick, PT      Row Name 08/09/19 1044             Gait/Stairs Assessment/Training    [place] Level (Gait)  contact guard  -MD      Distance in Feet (Gait)  200  -MD      Pattern (Gait)  step-through  -MD      Deviations/Abnormal Patterns (Gait)  festinating/shuffling;eliza decreased  -MD      Bilateral Gait Deviations  forward flexed posture;heel strike decreased  -MD      [place] Level (Stairs)  verbal cues;contact guard  -MD      Handrail Location (Stairs)  both sides  -MD      Number of Steps (Stairs)  4  -MD      Ascending Technique (Stairs)  step-over-step  -MD      Descending Technique (Stairs)  step-to-step  -MD      Recorded by [MD] Mira Chadwick, HESHAM      Row Name 08/09/19 1044 08/09/19 0930          Pain Scale: Numbers Pre/Post-Treatment    Pain Scale: Numbers, Pretreatment  0/10 - no pain  -MD  0/10 - no pain  -KB     Pain Scale: Numbers, Post-Treatment  --  0/10 - no pain  -KB     Recorded by [MD] Mira Chadwick, PT [KB] Bruton, Katherine L     Row Name 08/09/19 1044             Standing Balance Activity    Activities Performed (Standing, Balance Training)  standing ball toss bean bag toss at mini basketball hoop  -MD      Support Needed for Balance (Standing, Balance Training)  minimal external support for balance, 75% patient effort  -MD      Recorded by [MD] Mira Chadwick, PT      Row Name 08/09/19 1044             Therapeutic  Exercise    Therapeutic Exercise  supine, lower extremities  -MD      Recorded by [MD] Mira Chadwick PT      Row Name 08/09/19 1044             Lower Extremity Supine Therapeutic Exercise    Performed, Supine Lower Extremity (Therapeutic Exercise)  hip abduction/adduction;ankle dorsiflexion/plantarflexion;heel slides  -MD      Exercise Type, Supine Lower Extremity (Therapeutic Exercise)  AAROM (active assistive range of motion)  -MD      Sets/Reps Detail, Supine Lower Extremity (Therapeutic Exercise)  1/15  -MD      Recorded by [MD] Mira Chadwick, PT      Row Name 08/09/19 1044             Positioning and Restraints    Pre-Treatment Position  sitting in chair/recliner  -MD      Post Treatment Position  bed  -MD      In Bed  supine;call light within reach;exit alarm on;notified nsg  -MD      Recorded by [MD] Mira Chadwick PT        User Key  (r) = Recorded By, (t) = Taken By, (c) = Cosigned By    Initials Name Effective Dates    Mria Vaca, PT 04/03/18 -     KB Bruton, Katherine L 03/07/18 -         Wound 07/17/19 1015 Left neck incision (Active)   Dressing Appearance open to air 8/9/2019  8:00 AM   Closure Liquid skin adhesive 8/9/2019  8:00 AM   Base dry;clean 8/9/2019  8:00 AM   Drainage Amount none 8/8/2019  8:00 PM   Dressing Care, Wound open to air 8/9/2019  8:00 AM     Physical Therapy Education     Title: PT OT SLP Therapies (Not Started)     Topic: Physical Therapy (In Progress)     Point: Mobility training (In Progress)     Learning Progress Summary           Patient Nonacceptance, E,TB,D, NR by ENID at 8/5/2019 12:00 PM    Acceptance, E,D, NR by JK at 8/3/2019  1:35 PM    Acceptance, D, DU,NR by JK at 8/3/2019  8:56 AM                   Point: Home exercise program (In Progress)     Learning Progress Summary           Patient Nonacceptance, E,TB,D, NR by ENID at 8/5/2019 12:00 PM                   Point: Precautions (In Progress)     Learning Progress Summary           Patient Acceptance, E, NR by MD at 8/9/2019  11:12 AM    Acceptance, E, NR by MD at 8/8/2019 11:48 AM    Acceptance, E, NR by MD at 8/6/2019 10:41 AM    Acceptance, E, NR by MD at 8/2/2019 10:44 AM    Acceptance, E,D, NR by MD at 8/1/2019 12:16 PM                               User Key     Initials Effective Dates Name Provider Type Discipline    J 04/03/18 -  Nicole Austin, PT Physical Therapist PT    MD 04/03/18 -  Mira Chadwick PT Physical Therapist PT    ENID 04/03/18 -  Marycruz Schmitt PT Physical Therapist PT                  PT Recommendation and Plan  Anticipated Equipment Needs at Discharge (PT Eval): front wheeled walker  Frequency of Treatment (PT Eval): 5 times per week, 60 minutes per session                     Time Calculation:     PT Charges     Row Name 08/09/19 1434 08/09/19 1043          Time Calculation    Start Time  1400  -MD  1030  -MD     Stop Time  1430  -MD  1100  -MD     Time Calculation (min)  30 min  -MD  30 min  -MD     PT Received On  --  08/09/19  -MD     PT - Next Appointment  --  08/10/19  -MD       User Key  (r) = Recorded By, (t) = Taken By, (c) = Cosigned By    Initials Name Provider Type    Mira Vaca, PT Physical Therapist          Therapy Charges for Today     Code Description Service Date Service Provider Modifiers Qty    63305600567 HC PT THER PROC EA 15 MIN 8/8/2019 Mira Chadwick, PT GP 4    17774106769 HC PT THER PROC EA 15 MIN 8/9/2019 Mira Chadwick, PT GP 4                   Mira Chadwick PT  8/9/2019        conducted a detailed discussion of DNR status/consultation with other physicians/interpretation of diagnostic studies/additional history taking/documentation/I have personally provided the amount of critical care time documented below, excluding time spent on separate procedures. I spoke with the consulting service or read their note/recommendations.

## 2020-10-20 ENCOUNTER — TELEPHONE (OUTPATIENT)
Dept: NEUROSURGERY | Facility: CLINIC | Age: 75
End: 2020-10-20

## 2020-10-20 DIAGNOSIS — I65.23 CAROTID STENOSIS, BILATERAL: ICD-10-CM

## 2020-10-20 DIAGNOSIS — I65.29 STENOSIS OF CAROTID ARTERY, UNSPECIFIED LATERALITY: Primary | ICD-10-CM

## 2020-10-20 NOTE — TELEPHONE ENCOUNTER
----- Message from Lisa Sanchez RN sent at 10/13/2020  1:42 PM EDT -----  Lesli:  This patient is also being followed in the Vascular registry, do you know who is following her since she had her carotid surgery?  Thank you  Mira Sanchez

## 2020-10-20 NOTE — TELEPHONE ENCOUNTER
Carotid doppler order put in chart.  Unable to reach by any phone #, so a letter was sent instructing pt to get doppler and f/u with Dr Palma.

## 2020-10-20 NOTE — TELEPHONE ENCOUNTER
Patient with prior carotid stent in July 2019 with Dr. Oliva.  She no showed for appointment in January 2020 but it does not appear that anyone made any attempt for follow-up.  I am receiving messages from the vascular registry regarding this.  She did have a carotid Doppler study in March.  Please contact patient/family and arrange follow-up with Dr. Hernandez or Dr. Palma with new carotid Doppler study ASAP.

## 2020-11-13 ENCOUNTER — TELEPHONE (OUTPATIENT)
Dept: NEUROSURGERY | Facility: CLINIC | Age: 75
End: 2020-11-13

## 2020-11-16 NOTE — TELEPHONE ENCOUNTER
Spoke with Paz and we moved pt's appt to 12/1/20 so there will be enough time for the comparison read on the dopplers.

## 2020-12-01 ENCOUNTER — OFFICE VISIT (OUTPATIENT)
Dept: NEUROSURGERY | Facility: CLINIC | Age: 75
End: 2020-12-01

## 2020-12-01 DIAGNOSIS — I10 ESSENTIAL HYPERTENSION: ICD-10-CM

## 2020-12-01 DIAGNOSIS — I63.9 ACUTE ISCHEMIC STROKE (HCC): ICD-10-CM

## 2020-12-01 DIAGNOSIS — E78.2 MIXED HYPERLIPIDEMIA: ICD-10-CM

## 2020-12-01 DIAGNOSIS — I63.232 CEREBRAL INFARCTION DUE TO STENOSIS OF LEFT CAROTID ARTERY (HCC): Primary | ICD-10-CM

## 2020-12-01 PROCEDURE — 99441 PR PHYS/QHP TELEPHONE EVALUATION 5-10 MIN: CPT | Performed by: RADIOLOGY

## 2020-12-01 RX ORDER — TORSEMIDE 20 MG/1
20 TABLET ORAL DAILY
COMMUNITY
End: 2022-10-18

## 2020-12-01 RX ORDER — DIVALPROEX SODIUM 125 MG/1
125 TABLET, DELAYED RELEASE ORAL DAILY
COMMUNITY

## 2020-12-01 RX ORDER — LOSARTAN POTASSIUM 25 MG/1
25 TABLET ORAL 2 TIMES DAILY
COMMUNITY
End: 2022-11-06 | Stop reason: HOSPADM

## 2020-12-01 RX ORDER — GLIPIZIDE 5 MG/1
5 TABLET ORAL
COMMUNITY
End: 2022-11-06 | Stop reason: HOSPADM

## 2020-12-11 ENCOUNTER — TELEPHONE (OUTPATIENT)
Dept: NEUROSURGERY | Facility: CLINIC | Age: 75
End: 2020-12-11

## 2020-12-11 NOTE — TELEPHONE ENCOUNTER
PATIENT'S /POA CALLED REGARDING RECEIVING LETTER TO SCHEDULE DOPPLER & FOLLOW UP APPT. DANIEL STATES THAT PT IS CURRENTLY IN [facility] FOR THE PAST 14 MONTHS & WAS UNDER THE IMPRESSION THE NURSING HOME WOULD TAKE CARE OF MEDICAL NEEDS DANIEL UNSURE HOW TO PROCEED WITH THIS LETTER. DANIEL STATES HE SENT LETTER TO NURSING HOME BUT LETTER WAS SENT BACK. LETTER IN CHART. PLEASE ADVISE HOW PROVIDER WOULD LIKE TO PROCEED?    DANIEL CAN BE CONTACTED [phone] WITH FURTHER INSTRUCTIONS

## 2021-11-19 ENCOUNTER — TELEPHONE (OUTPATIENT)
Dept: NEUROSURGERY | Facility: CLINIC | Age: 76
End: 2021-11-19

## 2021-11-19 NOTE — TELEPHONE ENCOUNTER
Caller: DAUGHTER OF PATIENT       Best call back number: [phone]  Chief complaint:  CANCEL APPT     Type of visit: 1 YEAR FOLLOW UP             Additional notes: PT DAUGHTER CALLED AND STATED THAT SHE IS IN [facility] AND ALL APPT REQUEST NEED TO GO THROUGH THEM, THIS APPT ON 12/2 MAY NEED TO BE CANCELED AND OR A TELE VISIT PT IS TO FRAIL TO POSSIBLE MOVE. PLEASE CALL [facility] FOR FURTHER INFORMATION [phone]     THANK YOU

## 2021-11-19 NOTE — TELEPHONE ENCOUNTER
Spoke with Georgia at [facility] and advised her of appointment with Dr. Palma.Also advised her that patient will need to have carotid doppler done prior to that appointment. She voiced understanding.

## 2021-12-02 ENCOUNTER — TELEPHONE (OUTPATIENT)
Dept: NEUROSURGERY | Facility: CLINIC | Age: 76
End: 2021-12-02

## 2021-12-02 DIAGNOSIS — I65.29 CAROTID STENOSIS, ASYMPTOMATIC, UNSPECIFIED LATERALITY: Primary | ICD-10-CM

## 2021-12-02 NOTE — TELEPHONE ENCOUNTER
RETURNED CALL AND REQUESTED TO SPEAK TO JAGDISH WITH WHOM HE STATES JUST CALLED AND LEFT A MESSAGE FOR HIM TO RETURN HER CALL.  ATTEMPTED TO WARM TRANSFER PT TO OFFICE BUT JAGDISH WAS UNAVAILABLE AT THE MOMENT.      INFORMED PTS  I WAS ROUTING A MESSAGE TO OFFICE TO HAVE JAGDISH RETURN HIS CALL.  PTS  IS UPSET AND STATES HE IS NOT GETTING OFF OF THE PHONE UNTIL HE SPEAKS TO JAGDISH.   STATES HE IS VERY WORRIED ABOUT HIS WIFE.      CALLED OFFICE AND WARM TRANSFERRED PT TO [facility].      NO CALL BACK IS NECESSARY @ THIS TIME.  THANK YOU

## 2021-12-02 NOTE — TELEPHONE ENCOUNTER
LMR for pt's daughter to let her know her mother was not seen by us today due to lack of doppler study, which was to be done prior to appt.  I put a message with her paperwork from [facility] that they are to call [facility] to schedule doppler, then f/u with Dr DUONG 2 days after at least. I also advised that they should not send pt alone as she has difficulty communicating with us.  
1.84

## 2021-12-02 NOTE — TELEPHONE ENCOUNTER
PATIENT'S DAUGHTER CALLED AND GAVE 'S # TO CALL     PLEASE CALL  ONLY.  DAUGHTER IS NOT INVOLVED IN PATIENTS HEALTH    THANK YOU

## 2021-12-02 NOTE — TELEPHONE ENCOUNTER
CALLED AND ASKED WHERE HIS WIFE IS .  EXPLAINED THAT SHE IS ON THE WAY BACK TO [place] AND THERE IS A PLAN OF CARE WITH HER .     IS CALLING [place]

## 2022-04-01 NOTE — THERAPY TREATMENT NOTE
Inpatient Rehabilitation - Physical Therapy Treatment Note  [facility]     Patient Name: Darby Workman  : 1945  MRN: [id number]    Today's Date: 2019                 Admit Date: 2019      Visit Dx:    No diagnosis found.    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)   • Right-sided headache   • Acute ischemic stroke (CMS/HCC)   • Embolic stroke (CMS/HCC)   • Anticoagulated by anticoagulation treatment   • Stroke (cerebrum) (CMS/HCC)   • Dysthymic disorder   • KIERAN (acute kidney injury) (CMS/HCC)   • Hypotension       Therapy Treatment      Wound 19 1015 Left neck incision (Active)   Dressing Appearance open to air 2019 12:00 PM   Closure Liquid skin adhesive 2019 12:00 PM   Base dry 2019 12:00 PM   Periwound ecchymotic 2019 12:00 PM   Periwound Temperature warm 2019 12:00 PM   Edges rolled/closed 2019 12:00 PM   Drainage Amount none 2019 12:00 PM     Physical Therapy Education     Title: PT OT SLP Therapies (Not Started)     Topic: Physical Therapy (In Progress)     Point: Mobility training (Done)     Learning Progress Summary           Patient Acceptance, E,TB, NR,DU by KELBY at 2019  9:55 AM                   Point: Body mechanics (In Progress)     Learning Progress Summary           Patient Acceptance, E, NR by YAKOV at 2019  7:32 PM                   Point: Precautions (In Progress)     Learning Progress Summary           Patient Acceptance, E, NR by MD at 2019  9:56 AM    Acceptance, E, NR by MD at 9/3/2019  9:22 AM    Acceptance, E, NR,NL by MD at 2019  2:29 PM                               User Key     Initials Effective Dates Name Provider Type Discipline    KELBY 18 -  Poppy Rosa, PT Physical Therapist PT    MD 18 -  Mira Chadwick PT Physical Therapist  PT    YAKOV 02/15/18 -  Danilo Rosa, RN Registered Nurse Nurse                  PT Recommendation and Plan  Anticipated Discharge Disposition (PT): skilled nursing facility  Plan of Care Reviewed With: patient           Outcome Measures     Row Name 09/06/19 0900 09/04/19 0935          How much help from another person do you currently need...    Turning from your back to your side while in flat bed without using bedrails?  4  -MD  4  -KELBY     Moving from lying on back to sitting on the side of a flat bed without bedrails?  4  -MD  4  -KELBY     Moving to and from a bed to a chair (including a wheelchair)?  3  -MD  3  -KELBY     Standing up from a chair using your arms (e.g., wheelchair, bedside chair)?  3  -MD  3  -KELBY     Climbing 3-5 steps with a railing?  3  -MD  3  -KELBY     To walk in hospital room?  3  -MD  3  -KELBY     AM-PAC 6 Clicks Score (PT)  20  -MD  20  -KELBY        Functional Assessment    Outcome Measure Options  AM-PAC 6 Clicks Basic Mobility (PT)  -MD  AM-PAC 6 Clicks Basic Mobility (PT)  -KELBY       User Key  (r) = Recorded By, (t) = Taken By, (c) = Cosigned By    Initials Name Provider Type    Poppy Trevino PT Physical Therapist    Mira Vaca, PT Physical Therapist             Time Calculation:     PT Charges     Row Name 09/06/19 0952             Time Calculation    Start Time  0932  -MD      Stop Time  0941  -MD      Time Calculation (min)  9 min  -MD      PT Received On  09/06/19  -MD      PT - Next Appointment  09/09/19  -MD        User Key  (r) = Recorded By, (t) = Taken By, (c) = Cosigned By    Initials Name Provider Type    Mira Vaca, PT Physical Therapist          Therapy Charges for Today     Code Description Service Date Service Provider Modifiers Qty    13012349919 HC PT THER PROC EA 15 MIN 9/6/2019 Mira Chadwick, PT GP 1            PT G-Codes  Outcome Measure Options: AM-PAC 6 Clicks Basic Mobility (PT)  AM-PAC 6 Clicks Score (PT): 20  AM-PAC 6 Clicks Score (OT): 14      Mira Chadwick  PT  9/6/2019        Instructions (Optional): Continue SRT Detail Level: Simple

## 2022-04-28 NOTE — PROGRESS NOTES
Inpatient Rehabilitation Plan of Care Note    Plan of Care  Care Plan Reviewed - No updates at this time.    Sphincter Control    Performed Intervention(s)  contact isolation    Signed by: Eda Allen RN     Gabapentin Pregnancy And Lactation Text: This medication is Pregnancy Category C and isn't considered safe during pregnancy. It is excreted in breast milk.

## 2022-07-20 NOTE — PROGRESS NOTES
Discharge Planning Assessment  [facility]     Patient Name: Landy Workman  MRN: [id number]  Today's Date: 5/15/2019    Admit Date: 5/14/2019    Discharge Needs Assessment     Row Name 05/15/19 6652       Living Environment    Lives With  spouse    Current Living Arrangements  home/apartment/condo    Primary Care Provided by  self    Family Caregiver if Needed  spouse;child(sahil), adult    Quality of Family Relationships  helpful;involved;supportive    Able to Return to Prior Arrangements  yes       Transition Planning    Patient/Family Anticipates Transition to  home with family    Transportation Anticipated  family or friend will provide;car, drives self       Discharge Needs Assessment    Equipment Currently Used at Home  shower chair        Discharge Plan     Row Name 05/15/19 0270       Plan    Plan  Pt plans to return home with her  upon DC.  CCP will continue to follow.     Plan Comments  Spoke with pt at bedside.  Facesheet and pharmacy info confirmed.  Pt lives in a house with her , is IADLs and can drive.  Her only home DME is a shower chair, but she does not need to use it.  No hx of HH or SNF.  Pt denies needs at this time.  CCP will continue to follow.                                           Destination      No service coordination in this encounter.      Durable Medical Equipment      No service coordination in this encounter.      Dialysis/Infusion      No service coordination in this encounter.      Home Medical Care      No service coordination in this encounter.      Therapy      No service coordination in this encounter.      Community Resources      No service coordination in this encounter.          Demographic Summary     Row Name 05/15/19 0573       General Information    Admission Type  inpatient    Arrived From  home    Referral Source  admission list    Reason for Consult  discharge planning    Preferred Language  English        Functional Status     Row Name 05/15/19 4550        Functional Status    Usual Activity Tolerance  good    Current Activity Tolerance  good       Functional Status, IADL    Medications  independent    Meal Preparation  independent    Housekeeping  independent    Laundry  independent    Shopping  independent       Mental Status    General Appearance WDL  WDL       Mental Status Summary    Recent Changes in Mental Status/Cognitive Functioning  no changes       Employment/    Employment Status  retired        Psychosocial    No documentation.       Abuse/Neglect    No documentation.       Legal    No documentation.       Substance Abuse    No documentation.       Patient Forms    No documentation.           Carla Pak RN     Dose Increase/Decrease #2: increased 25 %

## 2022-10-18 ENCOUNTER — HOSPITAL ENCOUNTER (INPATIENT)
Facility: HOSPITAL | Age: 77
LOS: 19 days | Discharge: NURSING, REHAB OR LONG-TERM CARE | End: 2022-11-06
Attending: EMERGENCY MEDICINE | Admitting: HOSPITALIST

## 2022-10-18 DIAGNOSIS — L10.0 PEMPHIGUS VULGARIS: Primary | ICD-10-CM

## 2022-10-18 DIAGNOSIS — I63.9 CEREBROVASCULAR ACCIDENT (CVA), UNSPECIFIED MECHANISM: ICD-10-CM

## 2022-10-18 DIAGNOSIS — E11.65 TYPE 2 DIABETES MELLITUS WITH HYPERGLYCEMIA, UNSPECIFIED WHETHER LONG TERM INSULIN USE: ICD-10-CM

## 2022-10-18 DIAGNOSIS — F03.90 DEMENTIA WITHOUT BEHAVIORAL DISTURBANCE: ICD-10-CM

## 2022-10-18 LAB
ALBUMIN SERPL-MCNC: 3.5 G/DL (ref 3.5–5.2)
ALBUMIN/GLOB SERPL: 1.3 G/DL
ALP SERPL-CCNC: 114 U/L (ref 39–117)
ALT SERPL W P-5'-P-CCNC: 16 U/L (ref 1–33)
ANION GAP SERPL CALCULATED.3IONS-SCNC: 7.4 MMOL/L (ref 5–15)
APTT PPP: 30.1 SECONDS (ref 22.7–35.4)
AST SERPL-CCNC: 15 U/L (ref 1–32)
BASOPHILS # BLD AUTO: 0.07 10*3/MM3 (ref 0–0.2)
BASOPHILS NFR BLD AUTO: 0.8 % (ref 0–1.5)
BILIRUB SERPL-MCNC: 0.5 MG/DL (ref 0–1.2)
BUN SERPL-MCNC: 22 MG/DL (ref 8–23)
BUN/CREAT SERPL: 28.9 (ref 7–25)
CALCIUM SPEC-SCNC: 8.9 MG/DL (ref 8.6–10.5)
CHLORIDE SERPL-SCNC: 104 MMOL/L (ref 98–107)
CO2 SERPL-SCNC: 26.6 MMOL/L (ref 22–29)
CREAT SERPL-MCNC: 0.76 MG/DL (ref 0.57–1)
CRP SERPL-MCNC: 1.66 MG/DL (ref 0–0.5)
DEPRECATED RDW RBC AUTO: 41.2 FL (ref 37–54)
EGFRCR SERPLBLD CKD-EPI 2021: 80.3 ML/MIN/1.73
EOSINOPHIL # BLD AUTO: 1.29 10*3/MM3 (ref 0–0.4)
EOSINOPHIL NFR BLD AUTO: 14.3 % (ref 0.3–6.2)
ERYTHROCYTE [DISTWIDTH] IN BLOOD BY AUTOMATED COUNT: 12.5 % (ref 12.3–15.4)
ERYTHROCYTE [SEDIMENTATION RATE] IN BLOOD: 25 MM/HR (ref 0–30)
GLOBULIN UR ELPH-MCNC: 2.6 GM/DL
GLUCOSE BLDC GLUCOMTR-MCNC: 162 MG/DL (ref 70–130)
GLUCOSE BLDC GLUCOMTR-MCNC: 222 MG/DL (ref 70–130)
GLUCOSE BLDC GLUCOMTR-MCNC: 353 MG/DL (ref 70–130)
GLUCOSE SERPL-MCNC: 168 MG/DL (ref 65–99)
HCT VFR BLD AUTO: 41.5 % (ref 34–46.6)
HGB BLD-MCNC: 13.9 G/DL (ref 12–15.9)
IMM GRANULOCYTES # BLD AUTO: 0.03 10*3/MM3 (ref 0–0.05)
IMM GRANULOCYTES NFR BLD AUTO: 0.3 % (ref 0–0.5)
INR PPP: 0.97 (ref 0.9–1.1)
LYMPHOCYTES # BLD AUTO: 1.22 10*3/MM3 (ref 0.7–3.1)
LYMPHOCYTES NFR BLD AUTO: 13.6 % (ref 19.6–45.3)
MCH RBC QN AUTO: 29.8 PG (ref 26.6–33)
MCHC RBC AUTO-ENTMCNC: 33.5 G/DL (ref 31.5–35.7)
MCV RBC AUTO: 88.9 FL (ref 79–97)
MONOCYTES # BLD AUTO: 0.56 10*3/MM3 (ref 0.1–0.9)
MONOCYTES NFR BLD AUTO: 6.2 % (ref 5–12)
NEUTROPHILS NFR BLD AUTO: 5.83 10*3/MM3 (ref 1.7–7)
NEUTROPHILS NFR BLD AUTO: 64.8 % (ref 42.7–76)
NRBC BLD AUTO-RTO: 0 /100 WBC (ref 0–0.2)
NT-PROBNP SERPL-MCNC: 540 PG/ML (ref 0–1800)
PLATELET # BLD AUTO: 239 10*3/MM3 (ref 140–450)
PMV BLD AUTO: 9.7 FL (ref 6–12)
POTASSIUM SERPL-SCNC: 4.8 MMOL/L (ref 3.5–5.2)
PROT SERPL-MCNC: 6.1 G/DL (ref 6–8.5)
PROTHROMBIN TIME: 13 SECONDS (ref 11.7–14.2)
RBC # BLD AUTO: 4.67 10*6/MM3 (ref 3.77–5.28)
SODIUM SERPL-SCNC: 138 MMOL/L (ref 136–145)
WBC NRBC COR # BLD: 9 10*3/MM3 (ref 3.4–10.8)

## 2022-10-18 PROCEDURE — 80053 COMPREHEN METABOLIC PANEL: CPT | Performed by: EMERGENCY MEDICINE

## 2022-10-18 PROCEDURE — 85025 COMPLETE CBC W/AUTO DIFF WBC: CPT | Performed by: EMERGENCY MEDICINE

## 2022-10-18 PROCEDURE — 87147 CULTURE TYPE IMMUNOLOGIC: CPT | Performed by: EMERGENCY MEDICINE

## 2022-10-18 PROCEDURE — 85730 THROMBOPLASTIN TIME PARTIAL: CPT | Performed by: EMERGENCY MEDICINE

## 2022-10-18 PROCEDURE — 85610 PROTHROMBIN TIME: CPT | Performed by: EMERGENCY MEDICINE

## 2022-10-18 PROCEDURE — 99285 EMERGENCY DEPT VISIT HI MDM: CPT

## 2022-10-18 PROCEDURE — 82962 GLUCOSE BLOOD TEST: CPT

## 2022-10-18 PROCEDURE — 63710000001 INSULIN LISPRO (HUMAN) PER 5 UNITS: Performed by: HOSPITALIST

## 2022-10-18 PROCEDURE — 83880 ASSAY OF NATRIURETIC PEPTIDE: CPT | Performed by: HOSPITALIST

## 2022-10-18 PROCEDURE — 87070 CULTURE OTHR SPECIMN AEROBIC: CPT | Performed by: EMERGENCY MEDICINE

## 2022-10-18 PROCEDURE — 25010000002 METHYLPREDNISOLONE PER 125 MG: Performed by: EMERGENCY MEDICINE

## 2022-10-18 PROCEDURE — 86140 C-REACTIVE PROTEIN: CPT | Performed by: EMERGENCY MEDICINE

## 2022-10-18 PROCEDURE — G0378 HOSPITAL OBSERVATION PER HR: HCPCS

## 2022-10-18 PROCEDURE — 87205 SMEAR GRAM STAIN: CPT | Performed by: EMERGENCY MEDICINE

## 2022-10-18 PROCEDURE — 85652 RBC SED RATE AUTOMATED: CPT | Performed by: EMERGENCY MEDICINE

## 2022-10-18 PROCEDURE — 87186 SC STD MICRODIL/AGAR DIL: CPT | Performed by: EMERGENCY MEDICINE

## 2022-10-18 PROCEDURE — 88305 TISSUE EXAM BY PATHOLOGIST: CPT | Performed by: HOSPITALIST

## 2022-10-18 PROCEDURE — 25010000002 METHYLPREDNISOLONE PER 40 MG: Performed by: HOSPITALIST

## 2022-10-18 PROCEDURE — 87102 FUNGUS ISOLATION CULTURE: CPT | Performed by: HOSPITALIST

## 2022-10-18 RX ORDER — ACETAMINOPHEN 650 MG
1 TABLET, EXTENDED RELEASE ORAL DAILY
Status: DISCONTINUED | OUTPATIENT
Start: 2022-10-18 | End: 2022-11-03

## 2022-10-18 RX ORDER — SODIUM CHLORIDE 0.9 % (FLUSH) 0.9 %
10 SYRINGE (ML) INJECTION AS NEEDED
Status: DISCONTINUED | OUTPATIENT
Start: 2022-10-18 | End: 2022-11-06 | Stop reason: HOSPADM

## 2022-10-18 RX ORDER — LOSARTAN POTASSIUM 25 MG/1
25 TABLET ORAL 2 TIMES DAILY
Status: DISCONTINUED | OUTPATIENT
Start: 2022-10-18 | End: 2022-10-18

## 2022-10-18 RX ORDER — METHYLPREDNISOLONE SODIUM SUCCINATE 125 MG/2ML
125 INJECTION, POWDER, LYOPHILIZED, FOR SOLUTION INTRAMUSCULAR; INTRAVENOUS ONCE
Status: COMPLETED | OUTPATIENT
Start: 2022-10-18 | End: 2022-10-18

## 2022-10-18 RX ORDER — FEXOFENADINE HCL AND PSEUDOEPHEDRINE HCI 180; 240 MG/1; MG/1
1 TABLET, EXTENDED RELEASE ORAL DAILY
COMMUNITY
End: 2022-11-06 | Stop reason: HOSPADM

## 2022-10-18 RX ORDER — INSULIN LISPRO 100 [IU]/ML
0-7 INJECTION, SOLUTION INTRAVENOUS; SUBCUTANEOUS
Status: DISCONTINUED | OUTPATIENT
Start: 2022-10-18 | End: 2022-10-18

## 2022-10-18 RX ORDER — DIVALPROEX SODIUM 125 MG/1
125 TABLET, DELAYED RELEASE ORAL DAILY
Status: DISCONTINUED | OUTPATIENT
Start: 2022-10-18 | End: 2022-10-25

## 2022-10-18 RX ORDER — LOSARTAN POTASSIUM 100 MG/1
100 TABLET ORAL
Status: DISCONTINUED | OUTPATIENT
Start: 2022-10-18 | End: 2022-11-06 | Stop reason: HOSPADM

## 2022-10-18 RX ORDER — LOSARTAN POTASSIUM 50 MG/1
50 TABLET ORAL 2 TIMES DAILY
COMMUNITY
End: 2022-11-06 | Stop reason: HOSPADM

## 2022-10-18 RX ORDER — INSULIN LISPRO 100 [IU]/ML
5 INJECTION, SOLUTION INTRAVENOUS; SUBCUTANEOUS
Status: DISCONTINUED | OUTPATIENT
Start: 2022-10-18 | End: 2022-10-19

## 2022-10-18 RX ORDER — DORZOLAMIDE HCL 20 MG/ML
1 SOLUTION/ DROPS OPHTHALMIC 2 TIMES DAILY
Status: DISCONTINUED | OUTPATIENT
Start: 2022-10-18 | End: 2022-11-06 | Stop reason: HOSPADM

## 2022-10-18 RX ORDER — INSULIN GLARGINE 100 [IU]/ML
14 INJECTION, SOLUTION SUBCUTANEOUS DAILY
COMMUNITY
End: 2022-11-06 | Stop reason: HOSPADM

## 2022-10-18 RX ORDER — BRIMONIDINE TARTRATE 2 MG/ML
1 SOLUTION/ DROPS OPHTHALMIC 2 TIMES DAILY
Status: DISCONTINUED | OUTPATIENT
Start: 2022-10-18 | End: 2022-11-06 | Stop reason: HOSPADM

## 2022-10-18 RX ORDER — PANTOPRAZOLE SODIUM 40 MG/1
40 TABLET, DELAYED RELEASE ORAL
Status: DISCONTINUED | OUTPATIENT
Start: 2022-10-19 | End: 2022-11-06 | Stop reason: HOSPADM

## 2022-10-18 RX ORDER — MENTHOL AND ZINC OXIDE .44; 20.625 G/100G; G/100G
1 OINTMENT TOPICAL 3 TIMES DAILY
COMMUNITY
End: 2022-11-06 | Stop reason: HOSPADM

## 2022-10-18 RX ORDER — NEBIVOLOL 10 MG/1
10 TABLET ORAL
Status: DISCONTINUED | OUTPATIENT
Start: 2022-10-18 | End: 2022-10-29

## 2022-10-18 RX ORDER — INSULIN GLARGINE 100 [IU]/ML
20 INJECTION, SOLUTION SUBCUTANEOUS DAILY
COMMUNITY
End: 2022-11-06 | Stop reason: HOSPADM

## 2022-10-18 RX ORDER — PAROXETINE 10 MG/1
10 TABLET, FILM COATED ORAL DAILY
Status: DISCONTINUED | OUTPATIENT
Start: 2022-10-18 | End: 2022-11-06 | Stop reason: HOSPADM

## 2022-10-18 RX ORDER — ACETAMINOPHEN 650 MG
1 TABLET, EXTENDED RELEASE ORAL DAILY
COMMUNITY

## 2022-10-18 RX ORDER — ATORVASTATIN CALCIUM 40 MG/1
40 TABLET, FILM COATED ORAL NIGHTLY
COMMUNITY
End: 2022-11-06 | Stop reason: HOSPADM

## 2022-10-18 RX ORDER — ATORVASTATIN CALCIUM 20 MG/1
40 TABLET, FILM COATED ORAL NIGHTLY
Status: DISCONTINUED | OUTPATIENT
Start: 2022-10-18 | End: 2022-10-19

## 2022-10-18 RX ORDER — METHYLPREDNISOLONE SODIUM SUCCINATE 40 MG/ML
40 INJECTION, POWDER, LYOPHILIZED, FOR SOLUTION INTRAMUSCULAR; INTRAVENOUS EVERY 12 HOURS
Status: DISCONTINUED | OUTPATIENT
Start: 2022-10-18 | End: 2022-10-23

## 2022-10-18 RX ORDER — NYSTATIN 100000 U/G
1 CREAM TOPICAL 3 TIMES DAILY
COMMUNITY
End: 2022-11-06 | Stop reason: HOSPADM

## 2022-10-18 RX ORDER — INSULIN LISPRO 100 [IU]/ML
0-7 INJECTION, SOLUTION INTRAVENOUS; SUBCUTANEOUS
Status: DISCONTINUED | OUTPATIENT
Start: 2022-10-18 | End: 2022-11-06 | Stop reason: HOSPADM

## 2022-10-18 RX ORDER — NEBIVOLOL 10 MG/1
30 TABLET ORAL DAILY
Status: DISCONTINUED | OUTPATIENT
Start: 2022-10-18 | End: 2022-10-18

## 2022-10-18 RX ADMIN — LOSARTAN POTASSIUM 100 MG: 100 TABLET, FILM COATED ORAL at 20:15

## 2022-10-18 RX ADMIN — METHYLPREDNISOLONE SODIUM SUCCINATE 40 MG: 40 INJECTION, POWDER, FOR SOLUTION INTRAMUSCULAR; INTRAVENOUS at 18:08

## 2022-10-18 RX ADMIN — BRIMONIDINE TARTRATE 1 DROP: 2 SOLUTION OPHTHALMIC at 20:15

## 2022-10-18 RX ADMIN — PAROXETINE HYDROCHLORIDE 10 MG: 10 TABLET, FILM COATED ORAL at 18:09

## 2022-10-18 RX ADMIN — INSULIN GLARGINE-YFGN 15 UNITS: 100 INJECTION, SOLUTION SUBCUTANEOUS at 18:19

## 2022-10-18 RX ADMIN — ATORVASTATIN CALCIUM 40 MG: 20 TABLET, FILM COATED ORAL at 20:15

## 2022-10-18 RX ADMIN — INSULIN LISPRO 5 UNITS: 100 INJECTION, SOLUTION INTRAVENOUS; SUBCUTANEOUS at 18:28

## 2022-10-18 RX ADMIN — METHYLPREDNISOLONE SODIUM SUCCINATE 125 MG: 125 INJECTION, POWDER, FOR SOLUTION INTRAMUSCULAR; INTRAVENOUS at 12:32

## 2022-10-18 RX ADMIN — POVIDONE-IODINE 1 APPLICATION: 10 SOLUTION TOPICAL at 18:09

## 2022-10-18 RX ADMIN — INSULIN LISPRO 3 UNITS: 100 INJECTION, SOLUTION INTRAVENOUS; SUBCUTANEOUS at 18:28

## 2022-10-18 RX ADMIN — NEBIVOLOL 10 MG: 10 TABLET ORAL at 19:15

## 2022-10-18 RX ADMIN — INSULIN LISPRO 6 UNITS: 100 INJECTION, SOLUTION INTRAVENOUS; SUBCUTANEOUS at 22:10

## 2022-10-18 RX ADMIN — DORZOLAMIDE HYDROCHLORIDE 1 DROP: 20 SOLUTION/ DROPS OPHTHALMIC at 20:16

## 2022-10-18 RX ADMIN — DIVALPROEX SODIUM 125 MG: 125 TABLET, DELAYED RELEASE ORAL at 18:09

## 2022-10-19 LAB
ALBUMIN SERPL-MCNC: 3.2 G/DL (ref 3.5–5.2)
ALBUMIN/GLOB SERPL: 1.2 G/DL
ALP SERPL-CCNC: 102 U/L (ref 39–117)
ALT SERPL W P-5'-P-CCNC: 19 U/L (ref 1–33)
ANION GAP SERPL CALCULATED.3IONS-SCNC: 8.4 MMOL/L (ref 5–15)
AST SERPL-CCNC: 15 U/L (ref 1–32)
BASOPHILS # BLD AUTO: 0.01 10*3/MM3 (ref 0–0.2)
BASOPHILS NFR BLD AUTO: 0.1 % (ref 0–1.5)
BILIRUB SERPL-MCNC: 0.4 MG/DL (ref 0–1.2)
BUN SERPL-MCNC: 29 MG/DL (ref 8–23)
BUN/CREAT SERPL: 38.2 (ref 7–25)
CALCIUM SPEC-SCNC: 8.6 MG/DL (ref 8.6–10.5)
CHLORIDE SERPL-SCNC: 105 MMOL/L (ref 98–107)
CHOLEST SERPL-MCNC: 141 MG/DL (ref 0–200)
CO2 SERPL-SCNC: 23.6 MMOL/L (ref 22–29)
CREAT SERPL-MCNC: 0.76 MG/DL (ref 0.57–1)
DEPRECATED RDW RBC AUTO: 40.5 FL (ref 37–54)
EGFRCR SERPLBLD CKD-EPI 2021: 80.3 ML/MIN/1.73
EOSINOPHIL # BLD AUTO: 0 10*3/MM3 (ref 0–0.4)
EOSINOPHIL NFR BLD AUTO: 0 % (ref 0.3–6.2)
ERYTHROCYTE [DISTWIDTH] IN BLOOD BY AUTOMATED COUNT: 12.5 % (ref 12.3–15.4)
GLOBULIN UR ELPH-MCNC: 2.7 GM/DL
GLUCOSE BLDC GLUCOMTR-MCNC: 206 MG/DL (ref 70–130)
GLUCOSE BLDC GLUCOMTR-MCNC: 258 MG/DL (ref 70–130)
GLUCOSE BLDC GLUCOMTR-MCNC: 267 MG/DL (ref 70–130)
GLUCOSE BLDC GLUCOMTR-MCNC: 315 MG/DL (ref 70–130)
GLUCOSE SERPL-MCNC: 237 MG/DL (ref 65–99)
HBA1C MFR BLD: 11.3 % (ref 4.8–5.6)
HCT VFR BLD AUTO: 40.7 % (ref 34–46.6)
HDLC SERPL-MCNC: 47 MG/DL (ref 40–60)
HGB BLD-MCNC: 13.5 G/DL (ref 12–15.9)
IMM GRANULOCYTES # BLD AUTO: 0.06 10*3/MM3 (ref 0–0.05)
IMM GRANULOCYTES NFR BLD AUTO: 0.5 % (ref 0–0.5)
LDLC SERPL CALC-MCNC: 81 MG/DL (ref 0–100)
LDLC/HDLC SERPL: 1.73 {RATIO}
LYMPHOCYTES # BLD AUTO: 0.67 10*3/MM3 (ref 0.7–3.1)
LYMPHOCYTES NFR BLD AUTO: 6.1 % (ref 19.6–45.3)
MCH RBC QN AUTO: 29.2 PG (ref 26.6–33)
MCHC RBC AUTO-ENTMCNC: 33.2 G/DL (ref 31.5–35.7)
MCV RBC AUTO: 87.9 FL (ref 79–97)
MONOCYTES # BLD AUTO: 0.15 10*3/MM3 (ref 0.1–0.9)
MONOCYTES NFR BLD AUTO: 1.4 % (ref 5–12)
NEUTROPHILS NFR BLD AUTO: 10.16 10*3/MM3 (ref 1.7–7)
NEUTROPHILS NFR BLD AUTO: 91.9 % (ref 42.7–76)
NRBC BLD AUTO-RTO: 0 /100 WBC (ref 0–0.2)
PLATELET # BLD AUTO: 248 10*3/MM3 (ref 140–450)
PMV BLD AUTO: 9.8 FL (ref 6–12)
POTASSIUM SERPL-SCNC: 4.7 MMOL/L (ref 3.5–5.2)
PROT SERPL-MCNC: 5.9 G/DL (ref 6–8.5)
RBC # BLD AUTO: 4.63 10*6/MM3 (ref 3.77–5.28)
SODIUM SERPL-SCNC: 137 MMOL/L (ref 136–145)
TRIGL SERPL-MCNC: 64 MG/DL (ref 0–150)
TSH SERPL DL<=0.05 MIU/L-ACNC: 0.38 UIU/ML (ref 0.27–4.2)
VLDLC SERPL-MCNC: 13 MG/DL (ref 5–40)
WBC NRBC COR # BLD: 11.05 10*3/MM3 (ref 3.4–10.8)

## 2022-10-19 PROCEDURE — 83036 HEMOGLOBIN GLYCOSYLATED A1C: CPT | Performed by: HOSPITALIST

## 2022-10-19 PROCEDURE — 82962 GLUCOSE BLOOD TEST: CPT

## 2022-10-19 PROCEDURE — 25010000002 METHYLPREDNISOLONE PER 40 MG: Performed by: HOSPITALIST

## 2022-10-19 PROCEDURE — 80061 LIPID PANEL: CPT | Performed by: HOSPITALIST

## 2022-10-19 PROCEDURE — G0378 HOSPITAL OBSERVATION PER HR: HCPCS

## 2022-10-19 PROCEDURE — 85025 COMPLETE CBC W/AUTO DIFF WBC: CPT | Performed by: HOSPITALIST

## 2022-10-19 PROCEDURE — 97162 PT EVAL MOD COMPLEX 30 MIN: CPT

## 2022-10-19 PROCEDURE — 97530 THERAPEUTIC ACTIVITIES: CPT

## 2022-10-19 PROCEDURE — 84443 ASSAY THYROID STIM HORMONE: CPT | Performed by: HOSPITALIST

## 2022-10-19 PROCEDURE — 80053 COMPREHEN METABOLIC PANEL: CPT | Performed by: HOSPITALIST

## 2022-10-19 PROCEDURE — 63710000001 INSULIN LISPRO (HUMAN) PER 5 UNITS: Performed by: HOSPITALIST

## 2022-10-19 RX ORDER — ATORVASTATIN CALCIUM 20 MG/1
10 TABLET, FILM COATED ORAL NIGHTLY
Status: DISCONTINUED | OUTPATIENT
Start: 2022-10-19 | End: 2022-11-06 | Stop reason: HOSPADM

## 2022-10-19 RX ORDER — INSULIN LISPRO 100 [IU]/ML
7 INJECTION, SOLUTION INTRAVENOUS; SUBCUTANEOUS
Status: DISCONTINUED | OUTPATIENT
Start: 2022-10-19 | End: 2022-10-20

## 2022-10-19 RX ADMIN — BRIMONIDINE TARTRATE 1 DROP: 2 SOLUTION OPHTHALMIC at 08:56

## 2022-10-19 RX ADMIN — PANTOPRAZOLE SODIUM 40 MG: 40 TABLET, DELAYED RELEASE ORAL at 05:01

## 2022-10-19 RX ADMIN — MICONAZOLE NITRATE 1 APPLICATION: 20 CREAM TOPICAL at 21:39

## 2022-10-19 RX ADMIN — DIVALPROEX SODIUM 125 MG: 125 TABLET, DELAYED RELEASE ORAL at 08:55

## 2022-10-19 RX ADMIN — INSULIN LISPRO 4 UNITS: 100 INJECTION, SOLUTION INTRAVENOUS; SUBCUTANEOUS at 21:59

## 2022-10-19 RX ADMIN — LOSARTAN POTASSIUM 100 MG: 100 TABLET, FILM COATED ORAL at 08:55

## 2022-10-19 RX ADMIN — POVIDONE-IODINE 1 APPLICATION: 10 SOLUTION TOPICAL at 13:05

## 2022-10-19 RX ADMIN — NEBIVOLOL 10 MG: 10 TABLET ORAL at 08:55

## 2022-10-19 RX ADMIN — DORZOLAMIDE HYDROCHLORIDE 1 DROP: 20 SOLUTION/ DROPS OPHTHALMIC at 21:59

## 2022-10-19 RX ADMIN — INSULIN LISPRO 5 UNITS: 100 INJECTION, SOLUTION INTRAVENOUS; SUBCUTANEOUS at 12:22

## 2022-10-19 RX ADMIN — INSULIN LISPRO 7 UNITS: 100 INJECTION, SOLUTION INTRAVENOUS; SUBCUTANEOUS at 12:22

## 2022-10-19 RX ADMIN — MUPIROCIN 1 APPLICATION: 20 OINTMENT TOPICAL at 21:38

## 2022-10-19 RX ADMIN — INSULIN LISPRO 7 UNITS: 100 INJECTION, SOLUTION INTRAVENOUS; SUBCUTANEOUS at 17:27

## 2022-10-19 RX ADMIN — BRIMONIDINE TARTRATE 1 DROP: 2 SOLUTION OPHTHALMIC at 22:01

## 2022-10-19 RX ADMIN — INSULIN GLARGINE-YFGN 15 UNITS: 100 INJECTION, SOLUTION SUBCUTANEOUS at 08:59

## 2022-10-19 RX ADMIN — METHYLPREDNISOLONE SODIUM SUCCINATE 40 MG: 40 INJECTION, POWDER, FOR SOLUTION INTRAMUSCULAR; INTRAVENOUS at 04:59

## 2022-10-19 RX ADMIN — ATORVASTATIN CALCIUM 10 MG: 20 TABLET, FILM COATED ORAL at 21:38

## 2022-10-19 RX ADMIN — PAROXETINE HYDROCHLORIDE 10 MG: 10 TABLET, FILM COATED ORAL at 08:55

## 2022-10-19 RX ADMIN — DORZOLAMIDE HYDROCHLORIDE 1 DROP: 20 SOLUTION/ DROPS OPHTHALMIC at 08:56

## 2022-10-19 RX ADMIN — METHYLPREDNISOLONE SODIUM SUCCINATE 40 MG: 40 INJECTION, POWDER, FOR SOLUTION INTRAMUSCULAR; INTRAVENOUS at 17:27

## 2022-10-19 RX ADMIN — Medication 10 ML: at 08:54

## 2022-10-19 RX ADMIN — INSULIN LISPRO 5 UNITS: 100 INJECTION, SOLUTION INTRAVENOUS; SUBCUTANEOUS at 08:58

## 2022-10-19 RX ADMIN — INSULIN LISPRO 4 UNITS: 100 INJECTION, SOLUTION INTRAVENOUS; SUBCUTANEOUS at 17:27

## 2022-10-19 RX ADMIN — INSULIN LISPRO 3 UNITS: 100 INJECTION, SOLUTION INTRAVENOUS; SUBCUTANEOUS at 08:58

## 2022-10-20 LAB
ANION GAP SERPL CALCULATED.3IONS-SCNC: 7.9 MMOL/L (ref 5–15)
BASOPHILS # BLD AUTO: 0.02 10*3/MM3 (ref 0–0.2)
BASOPHILS NFR BLD AUTO: 0.1 % (ref 0–1.5)
BUN SERPL-MCNC: 26 MG/DL (ref 8–23)
BUN/CREAT SERPL: 32.9 (ref 7–25)
CALCIUM SPEC-SCNC: 9.1 MG/DL (ref 8.6–10.5)
CHLORIDE SERPL-SCNC: 100 MMOL/L (ref 98–107)
CO2 SERPL-SCNC: 26.1 MMOL/L (ref 22–29)
CREAT SERPL-MCNC: 0.79 MG/DL (ref 0.57–1)
DEPRECATED RDW RBC AUTO: 41.3 FL (ref 37–54)
EGFRCR SERPLBLD CKD-EPI 2021: 76.7 ML/MIN/1.73
EOSINOPHIL # BLD AUTO: 0 10*3/MM3 (ref 0–0.4)
EOSINOPHIL NFR BLD AUTO: 0 % (ref 0.3–6.2)
ERYTHROCYTE [DISTWIDTH] IN BLOOD BY AUTOMATED COUNT: 12.9 % (ref 12.3–15.4)
GLUCOSE BLDC GLUCOMTR-MCNC: 229 MG/DL (ref 70–130)
GLUCOSE BLDC GLUCOMTR-MCNC: 312 MG/DL (ref 70–130)
GLUCOSE BLDC GLUCOMTR-MCNC: 321 MG/DL (ref 70–130)
GLUCOSE BLDC GLUCOMTR-MCNC: 338 MG/DL (ref 70–130)
GLUCOSE SERPL-MCNC: 262 MG/DL (ref 65–99)
HCT VFR BLD AUTO: 39.9 % (ref 34–46.6)
HGB BLD-MCNC: 13.5 G/DL (ref 12–15.9)
IMM GRANULOCYTES # BLD AUTO: 0.08 10*3/MM3 (ref 0–0.05)
IMM GRANULOCYTES NFR BLD AUTO: 0.6 % (ref 0–0.5)
LYMPHOCYTES # BLD AUTO: 0.71 10*3/MM3 (ref 0.7–3.1)
LYMPHOCYTES NFR BLD AUTO: 4.9 % (ref 19.6–45.3)
MCH RBC QN AUTO: 29.8 PG (ref 26.6–33)
MCHC RBC AUTO-ENTMCNC: 33.8 G/DL (ref 31.5–35.7)
MCV RBC AUTO: 88.1 FL (ref 79–97)
MONOCYTES # BLD AUTO: 0.48 10*3/MM3 (ref 0.1–0.9)
MONOCYTES NFR BLD AUTO: 3.3 % (ref 5–12)
NEUTROPHILS NFR BLD AUTO: 13.22 10*3/MM3 (ref 1.7–7)
NEUTROPHILS NFR BLD AUTO: 91.1 % (ref 42.7–76)
NRBC BLD AUTO-RTO: 0 /100 WBC (ref 0–0.2)
PLATELET # BLD AUTO: 238 10*3/MM3 (ref 140–450)
PMV BLD AUTO: 9.9 FL (ref 6–12)
POTASSIUM SERPL-SCNC: 4.5 MMOL/L (ref 3.5–5.2)
RBC # BLD AUTO: 4.53 10*6/MM3 (ref 3.77–5.28)
SODIUM SERPL-SCNC: 134 MMOL/L (ref 136–145)
WBC NRBC COR # BLD: 14.51 10*3/MM3 (ref 3.4–10.8)

## 2022-10-20 PROCEDURE — 80048 BASIC METABOLIC PNL TOTAL CA: CPT | Performed by: HOSPITALIST

## 2022-10-20 PROCEDURE — 97530 THERAPEUTIC ACTIVITIES: CPT

## 2022-10-20 PROCEDURE — 82962 GLUCOSE BLOOD TEST: CPT

## 2022-10-20 PROCEDURE — 97166 OT EVAL MOD COMPLEX 45 MIN: CPT

## 2022-10-20 PROCEDURE — 63710000001 INSULIN LISPRO (HUMAN) PER 5 UNITS: Performed by: HOSPITALIST

## 2022-10-20 PROCEDURE — 85025 COMPLETE CBC W/AUTO DIFF WBC: CPT | Performed by: HOSPITALIST

## 2022-10-20 PROCEDURE — 25010000002 METHYLPREDNISOLONE PER 40 MG: Performed by: HOSPITALIST

## 2022-10-20 RX ORDER — INSULIN LISPRO 100 [IU]/ML
10 INJECTION, SOLUTION INTRAVENOUS; SUBCUTANEOUS
Status: DISCONTINUED | OUTPATIENT
Start: 2022-10-21 | End: 2022-10-23

## 2022-10-20 RX ADMIN — POVIDONE-IODINE 1 APPLICATION: 10 SOLUTION TOPICAL at 08:16

## 2022-10-20 RX ADMIN — INSULIN LISPRO 3 UNITS: 100 INJECTION, SOLUTION INTRAVENOUS; SUBCUTANEOUS at 08:11

## 2022-10-20 RX ADMIN — METHYLPREDNISOLONE SODIUM SUCCINATE 40 MG: 40 INJECTION, POWDER, FOR SOLUTION INTRAMUSCULAR; INTRAVENOUS at 17:12

## 2022-10-20 RX ADMIN — MICONAZOLE NITRATE 1 APPLICATION: 20 CREAM TOPICAL at 21:01

## 2022-10-20 RX ADMIN — INSULIN LISPRO 7 UNITS: 100 INJECTION, SOLUTION INTRAVENOUS; SUBCUTANEOUS at 08:10

## 2022-10-20 RX ADMIN — MUPIROCIN 1 APPLICATION: 20 OINTMENT TOPICAL at 21:00

## 2022-10-20 RX ADMIN — INSULIN LISPRO 7 UNITS: 100 INJECTION, SOLUTION INTRAVENOUS; SUBCUTANEOUS at 17:13

## 2022-10-20 RX ADMIN — MICONAZOLE NITRATE 1 APPLICATION: 20 CREAM TOPICAL at 08:17

## 2022-10-20 RX ADMIN — INSULIN LISPRO 5 UNITS: 100 INJECTION, SOLUTION INTRAVENOUS; SUBCUTANEOUS at 11:53

## 2022-10-20 RX ADMIN — INSULIN LISPRO 5 UNITS: 100 INJECTION, SOLUTION INTRAVENOUS; SUBCUTANEOUS at 17:13

## 2022-10-20 RX ADMIN — DIVALPROEX SODIUM 125 MG: 125 TABLET, DELAYED RELEASE ORAL at 08:10

## 2022-10-20 RX ADMIN — BRIMONIDINE TARTRATE 1 DROP: 2 SOLUTION OPHTHALMIC at 08:11

## 2022-10-20 RX ADMIN — LOSARTAN POTASSIUM 100 MG: 100 TABLET, FILM COATED ORAL at 08:10

## 2022-10-20 RX ADMIN — METHYLPREDNISOLONE SODIUM SUCCINATE 40 MG: 40 INJECTION, POWDER, FOR SOLUTION INTRAMUSCULAR; INTRAVENOUS at 05:10

## 2022-10-20 RX ADMIN — MUPIROCIN 1 APPLICATION: 20 OINTMENT TOPICAL at 08:17

## 2022-10-20 RX ADMIN — DORZOLAMIDE HYDROCHLORIDE 1 DROP: 20 SOLUTION/ DROPS OPHTHALMIC at 21:02

## 2022-10-20 RX ADMIN — PANTOPRAZOLE SODIUM 40 MG: 40 TABLET, DELAYED RELEASE ORAL at 05:10

## 2022-10-20 RX ADMIN — INSULIN LISPRO 5 UNITS: 100 INJECTION, SOLUTION INTRAVENOUS; SUBCUTANEOUS at 21:01

## 2022-10-20 RX ADMIN — ATORVASTATIN CALCIUM 10 MG: 20 TABLET, FILM COATED ORAL at 21:01

## 2022-10-20 RX ADMIN — INSULIN GLARGINE-YFGN 20 UNITS: 100 INJECTION, SOLUTION SUBCUTANEOUS at 09:45

## 2022-10-20 RX ADMIN — NEBIVOLOL 10 MG: 10 TABLET ORAL at 08:10

## 2022-10-20 RX ADMIN — INSULIN LISPRO 7 UNITS: 100 INJECTION, SOLUTION INTRAVENOUS; SUBCUTANEOUS at 11:53

## 2022-10-20 RX ADMIN — PAROXETINE HYDROCHLORIDE 10 MG: 10 TABLET, FILM COATED ORAL at 08:10

## 2022-10-20 RX ADMIN — BRIMONIDINE TARTRATE 1 DROP: 2 SOLUTION OPHTHALMIC at 21:01

## 2022-10-20 RX ADMIN — DORZOLAMIDE HYDROCHLORIDE 1 DROP: 20 SOLUTION/ DROPS OPHTHALMIC at 08:11

## 2022-10-21 LAB
ANION GAP SERPL CALCULATED.3IONS-SCNC: 8 MMOL/L (ref 5–15)
BACTERIA SPEC AEROBE CULT: ABNORMAL
BUN SERPL-MCNC: 20 MG/DL (ref 8–23)
BUN/CREAT SERPL: 27 (ref 7–25)
CALCIUM SPEC-SCNC: 8.8 MG/DL (ref 8.6–10.5)
CHLORIDE SERPL-SCNC: 100 MMOL/L (ref 98–107)
CO2 SERPL-SCNC: 29 MMOL/L (ref 22–29)
CREAT SERPL-MCNC: 0.74 MG/DL (ref 0.57–1)
DEPRECATED RDW RBC AUTO: 39.5 FL (ref 37–54)
EGFRCR SERPLBLD CKD-EPI 2021: 82.9 ML/MIN/1.73
ERYTHROCYTE [DISTWIDTH] IN BLOOD BY AUTOMATED COUNT: 12.4 % (ref 12.3–15.4)
GLUCOSE BLDC GLUCOMTR-MCNC: 198 MG/DL (ref 70–130)
GLUCOSE BLDC GLUCOMTR-MCNC: 241 MG/DL (ref 70–130)
GLUCOSE BLDC GLUCOMTR-MCNC: 268 MG/DL (ref 70–130)
GLUCOSE BLDC GLUCOMTR-MCNC: 310 MG/DL (ref 70–130)
GLUCOSE SERPL-MCNC: 247 MG/DL (ref 65–99)
GRAM STN SPEC: ABNORMAL
HCT VFR BLD AUTO: 41.3 % (ref 34–46.6)
HGB BLD-MCNC: 13.9 G/DL (ref 12–15.9)
MCH RBC QN AUTO: 29.7 PG (ref 26.6–33)
MCHC RBC AUTO-ENTMCNC: 33.7 G/DL (ref 31.5–35.7)
MCV RBC AUTO: 88.2 FL (ref 79–97)
PLATELET # BLD AUTO: 244 10*3/MM3 (ref 140–450)
PMV BLD AUTO: 10.3 FL (ref 6–12)
POTASSIUM SERPL-SCNC: 4.4 MMOL/L (ref 3.5–5.2)
RBC # BLD AUTO: 4.68 10*6/MM3 (ref 3.77–5.28)
SODIUM SERPL-SCNC: 137 MMOL/L (ref 136–145)
WBC NRBC COR # BLD: 13.58 10*3/MM3 (ref 3.4–10.8)

## 2022-10-21 PROCEDURE — 85027 COMPLETE CBC AUTOMATED: CPT | Performed by: INTERNAL MEDICINE

## 2022-10-21 PROCEDURE — 63710000001 INSULIN LISPRO (HUMAN) PER 5 UNITS: Performed by: INTERNAL MEDICINE

## 2022-10-21 PROCEDURE — 82962 GLUCOSE BLOOD TEST: CPT

## 2022-10-21 PROCEDURE — 25010000002 METHYLPREDNISOLONE PER 40 MG: Performed by: HOSPITALIST

## 2022-10-21 PROCEDURE — 97110 THERAPEUTIC EXERCISES: CPT

## 2022-10-21 PROCEDURE — 63710000001 INSULIN LISPRO (HUMAN) PER 5 UNITS: Performed by: HOSPITALIST

## 2022-10-21 PROCEDURE — 80048 BASIC METABOLIC PNL TOTAL CA: CPT | Performed by: INTERNAL MEDICINE

## 2022-10-21 RX ADMIN — INSULIN LISPRO 10 UNITS: 100 INJECTION, SOLUTION INTRAVENOUS; SUBCUTANEOUS at 18:02

## 2022-10-21 RX ADMIN — MUPIROCIN 1 APPLICATION: 20 OINTMENT TOPICAL at 12:35

## 2022-10-21 RX ADMIN — LOSARTAN POTASSIUM 100 MG: 100 TABLET, FILM COATED ORAL at 08:41

## 2022-10-21 RX ADMIN — INSULIN LISPRO 3 UNITS: 100 INJECTION, SOLUTION INTRAVENOUS; SUBCUTANEOUS at 08:41

## 2022-10-21 RX ADMIN — BRIMONIDINE TARTRATE 1 DROP: 2 SOLUTION OPHTHALMIC at 22:12

## 2022-10-21 RX ADMIN — INSULIN LISPRO 4 UNITS: 100 INJECTION, SOLUTION INTRAVENOUS; SUBCUTANEOUS at 12:32

## 2022-10-21 RX ADMIN — BRIMONIDINE TARTRATE 1 DROP: 2 SOLUTION OPHTHALMIC at 08:42

## 2022-10-21 RX ADMIN — MUPIROCIN 1 APPLICATION: 20 OINTMENT TOPICAL at 22:12

## 2022-10-21 RX ADMIN — MICONAZOLE NITRATE 1 APPLICATION: 20 CREAM TOPICAL at 22:13

## 2022-10-21 RX ADMIN — INSULIN LISPRO 2 UNITS: 100 INJECTION, SOLUTION INTRAVENOUS; SUBCUTANEOUS at 23:41

## 2022-10-21 RX ADMIN — INSULIN LISPRO 10 UNITS: 100 INJECTION, SOLUTION INTRAVENOUS; SUBCUTANEOUS at 08:42

## 2022-10-21 RX ADMIN — ATORVASTATIN CALCIUM 10 MG: 20 TABLET, FILM COATED ORAL at 22:12

## 2022-10-21 RX ADMIN — DORZOLAMIDE HYDROCHLORIDE 1 DROP: 20 SOLUTION/ DROPS OPHTHALMIC at 22:12

## 2022-10-21 RX ADMIN — MICONAZOLE NITRATE 1 APPLICATION: 20 CREAM TOPICAL at 10:40

## 2022-10-21 RX ADMIN — METHYLPREDNISOLONE SODIUM SUCCINATE 40 MG: 40 INJECTION, POWDER, FOR SOLUTION INTRAMUSCULAR; INTRAVENOUS at 05:55

## 2022-10-21 RX ADMIN — INSULIN GLARGINE-YFGN 25 UNITS: 100 INJECTION, SOLUTION SUBCUTANEOUS at 08:42

## 2022-10-21 RX ADMIN — PAROXETINE HYDROCHLORIDE 10 MG: 10 TABLET, FILM COATED ORAL at 08:41

## 2022-10-21 RX ADMIN — INSULIN LISPRO 10 UNITS: 100 INJECTION, SOLUTION INTRAVENOUS; SUBCUTANEOUS at 12:32

## 2022-10-21 RX ADMIN — NEBIVOLOL 10 MG: 10 TABLET ORAL at 08:41

## 2022-10-21 RX ADMIN — DIVALPROEX SODIUM 125 MG: 125 TABLET, DELAYED RELEASE ORAL at 08:41

## 2022-10-21 RX ADMIN — INSULIN LISPRO 5 UNITS: 100 INJECTION, SOLUTION INTRAVENOUS; SUBCUTANEOUS at 18:02

## 2022-10-21 RX ADMIN — METHYLPREDNISOLONE SODIUM SUCCINATE 40 MG: 40 INJECTION, POWDER, FOR SOLUTION INTRAMUSCULAR; INTRAVENOUS at 18:02

## 2022-10-21 RX ADMIN — DORZOLAMIDE HYDROCHLORIDE 1 DROP: 20 SOLUTION/ DROPS OPHTHALMIC at 08:42

## 2022-10-21 RX ADMIN — PANTOPRAZOLE SODIUM 40 MG: 40 TABLET, DELAYED RELEASE ORAL at 05:55

## 2022-10-22 LAB
GLUCOSE BLDC GLUCOMTR-MCNC: 169 MG/DL (ref 70–130)
GLUCOSE BLDC GLUCOMTR-MCNC: 188 MG/DL (ref 70–130)
GLUCOSE BLDC GLUCOMTR-MCNC: 400 MG/DL (ref 70–130)
GLUCOSE BLDC GLUCOMTR-MCNC: 426 MG/DL (ref 70–130)

## 2022-10-22 PROCEDURE — 25010000002 METHYLPREDNISOLONE PER 40 MG: Performed by: HOSPITALIST

## 2022-10-22 PROCEDURE — 63710000001 INSULIN LISPRO (HUMAN) PER 5 UNITS: Performed by: HOSPITALIST

## 2022-10-22 PROCEDURE — 82962 GLUCOSE BLOOD TEST: CPT

## 2022-10-22 PROCEDURE — 63710000001 INSULIN LISPRO (HUMAN) PER 5 UNITS: Performed by: INTERNAL MEDICINE

## 2022-10-22 RX ORDER — INSULIN LISPRO 100 [IU]/ML
10 INJECTION, SOLUTION INTRAVENOUS; SUBCUTANEOUS ONCE
Status: COMPLETED | OUTPATIENT
Start: 2022-10-22 | End: 2022-10-22

## 2022-10-22 RX ADMIN — MICONAZOLE NITRATE 1 APPLICATION: 20 CREAM TOPICAL at 11:49

## 2022-10-22 RX ADMIN — INSULIN LISPRO 10 UNITS: 100 INJECTION, SOLUTION INTRAVENOUS; SUBCUTANEOUS at 16:36

## 2022-10-22 RX ADMIN — PAROXETINE HYDROCHLORIDE 10 MG: 10 TABLET, FILM COATED ORAL at 11:49

## 2022-10-22 RX ADMIN — PANTOPRAZOLE SODIUM 40 MG: 40 TABLET, DELAYED RELEASE ORAL at 06:42

## 2022-10-22 RX ADMIN — INSULIN LISPRO 2 UNITS: 100 INJECTION, SOLUTION INTRAVENOUS; SUBCUTANEOUS at 11:48

## 2022-10-22 RX ADMIN — MUPIROCIN 1 APPLICATION: 20 OINTMENT TOPICAL at 20:25

## 2022-10-22 RX ADMIN — INSULIN LISPRO 10 UNITS: 100 INJECTION, SOLUTION INTRAVENOUS; SUBCUTANEOUS at 21:46

## 2022-10-22 RX ADMIN — METHYLPREDNISOLONE SODIUM SUCCINATE 40 MG: 40 INJECTION, POWDER, FOR SOLUTION INTRAMUSCULAR; INTRAVENOUS at 18:24

## 2022-10-22 RX ADMIN — NEBIVOLOL 10 MG: 10 TABLET ORAL at 11:49

## 2022-10-22 RX ADMIN — LOSARTAN POTASSIUM 100 MG: 100 TABLET, FILM COATED ORAL at 11:49

## 2022-10-22 RX ADMIN — INSULIN LISPRO 7 UNITS: 100 INJECTION, SOLUTION INTRAVENOUS; SUBCUTANEOUS at 16:37

## 2022-10-22 RX ADMIN — MUPIROCIN 1 APPLICATION: 20 OINTMENT TOPICAL at 11:49

## 2022-10-22 RX ADMIN — DORZOLAMIDE HYDROCHLORIDE 1 DROP: 20 SOLUTION/ DROPS OPHTHALMIC at 20:25

## 2022-10-22 RX ADMIN — DIVALPROEX SODIUM 125 MG: 125 TABLET, DELAYED RELEASE ORAL at 11:49

## 2022-10-22 RX ADMIN — BRIMONIDINE TARTRATE 1 DROP: 2 SOLUTION OPHTHALMIC at 11:49

## 2022-10-22 RX ADMIN — INSULIN LISPRO 10 UNITS: 100 INJECTION, SOLUTION INTRAVENOUS; SUBCUTANEOUS at 11:49

## 2022-10-22 RX ADMIN — BRIMONIDINE TARTRATE 1 DROP: 2 SOLUTION OPHTHALMIC at 20:25

## 2022-10-22 RX ADMIN — INSULIN GLARGINE-YFGN 30 UNITS: 100 INJECTION, SOLUTION SUBCUTANEOUS at 10:40

## 2022-10-22 RX ADMIN — METHYLPREDNISOLONE SODIUM SUCCINATE 40 MG: 40 INJECTION, POWDER, FOR SOLUTION INTRAMUSCULAR; INTRAVENOUS at 06:41

## 2022-10-22 RX ADMIN — DORZOLAMIDE HYDROCHLORIDE 1 DROP: 20 SOLUTION/ DROPS OPHTHALMIC at 11:49

## 2022-10-22 RX ADMIN — MICONAZOLE NITRATE 1 APPLICATION: 20 CREAM TOPICAL at 20:25

## 2022-10-22 RX ADMIN — ATORVASTATIN CALCIUM 10 MG: 20 TABLET, FILM COATED ORAL at 20:25

## 2022-10-22 RX ADMIN — POVIDONE-IODINE 1 APPLICATION: 10 SOLUTION TOPICAL at 11:48

## 2022-10-23 LAB
BACTERIA ISLT: NORMAL
GLUCOSE BLDC GLUCOMTR-MCNC: 100 MG/DL (ref 70–130)
GLUCOSE BLDC GLUCOMTR-MCNC: 132 MG/DL (ref 70–130)
GLUCOSE BLDC GLUCOMTR-MCNC: 226 MG/DL (ref 70–130)
GLUCOSE BLDC GLUCOMTR-MCNC: 259 MG/DL (ref 70–130)
GLUCOSE BLDC GLUCOMTR-MCNC: 51 MG/DL (ref 70–130)
GLUCOSE BLDC GLUCOMTR-MCNC: 52 MG/DL (ref 70–130)
GLUCOSE SERPL-MCNC: 131 MG/DL (ref 65–99)

## 2022-10-23 PROCEDURE — 82947 ASSAY GLUCOSE BLOOD QUANT: CPT | Performed by: HOSPITALIST

## 2022-10-23 PROCEDURE — 63710000001 INSULIN LISPRO (HUMAN) PER 5 UNITS: Performed by: INTERNAL MEDICINE

## 2022-10-23 PROCEDURE — 63710000001 INSULIN LISPRO (HUMAN) PER 5 UNITS: Performed by: HOSPITALIST

## 2022-10-23 PROCEDURE — 82962 GLUCOSE BLOOD TEST: CPT

## 2022-10-23 PROCEDURE — 25010000002 METHYLPREDNISOLONE PER 40 MG: Performed by: HOSPITALIST

## 2022-10-23 PROCEDURE — 63710000001 PREDNISONE PER 1 MG: Performed by: INTERNAL MEDICINE

## 2022-10-23 RX ORDER — DEXTROSE MONOHYDRATE 25 G/50ML
INJECTION, SOLUTION INTRAVENOUS
Status: COMPLETED
Start: 2022-10-23 | End: 2022-10-23

## 2022-10-23 RX ORDER — DEXTROSE MONOHYDRATE 25 G/50ML
25 INJECTION, SOLUTION INTRAVENOUS
Status: DISCONTINUED | OUTPATIENT
Start: 2022-10-23 | End: 2022-10-29

## 2022-10-23 RX ORDER — OLANZAPINE 10 MG/1
5 INJECTION, POWDER, LYOPHILIZED, FOR SOLUTION INTRAMUSCULAR EVERY 8 HOURS PRN
Status: DISCONTINUED | OUTPATIENT
Start: 2022-10-23 | End: 2022-11-06

## 2022-10-23 RX ORDER — CLONAZEPAM 0.5 MG/1
0.5 TABLET ORAL 3 TIMES DAILY PRN
Status: DISCONTINUED | OUTPATIENT
Start: 2022-10-23 | End: 2022-11-06

## 2022-10-23 RX ORDER — NICOTINE POLACRILEX 4 MG
15 LOZENGE BUCCAL
Status: DISCONTINUED | OUTPATIENT
Start: 2022-10-23 | End: 2022-10-29

## 2022-10-23 RX ORDER — PREDNISONE 20 MG/1
40 TABLET ORAL
Status: DISCONTINUED | OUTPATIENT
Start: 2022-10-23 | End: 2022-10-24

## 2022-10-23 RX ORDER — NICOTINE POLACRILEX 4 MG
LOZENGE BUCCAL
Status: DISPENSED
Start: 2022-10-23 | End: 2022-10-24

## 2022-10-23 RX ORDER — CLONAZEPAM 0.5 MG/1
0.5 TABLET ORAL EVERY 8 HOURS SCHEDULED
Status: DISCONTINUED | OUTPATIENT
Start: 2022-10-23 | End: 2022-10-23

## 2022-10-23 RX ORDER — INSULIN LISPRO 100 [IU]/ML
12 INJECTION, SOLUTION INTRAVENOUS; SUBCUTANEOUS
Status: DISCONTINUED | OUTPATIENT
Start: 2022-10-23 | End: 2022-10-29

## 2022-10-23 RX ADMIN — MUPIROCIN 1 APPLICATION: 20 OINTMENT TOPICAL at 22:17

## 2022-10-23 RX ADMIN — PANTOPRAZOLE SODIUM 40 MG: 40 TABLET, DELAYED RELEASE ORAL at 06:34

## 2022-10-23 RX ADMIN — DIVALPROEX SODIUM 125 MG: 125 TABLET, DELAYED RELEASE ORAL at 09:54

## 2022-10-23 RX ADMIN — ATORVASTATIN CALCIUM 10 MG: 20 TABLET, FILM COATED ORAL at 23:55

## 2022-10-23 RX ADMIN — PREDNISONE 40 MG: 20 TABLET ORAL at 09:54

## 2022-10-23 RX ADMIN — DORZOLAMIDE HYDROCHLORIDE 1 DROP: 20 SOLUTION/ DROPS OPHTHALMIC at 22:18

## 2022-10-23 RX ADMIN — DEXTROSE MONOHYDRATE 25 ML: 25 INJECTION, SOLUTION INTRAVENOUS at 22:32

## 2022-10-23 RX ADMIN — INSULIN LISPRO 4 UNITS: 100 INJECTION, SOLUTION INTRAVENOUS; SUBCUTANEOUS at 12:19

## 2022-10-23 RX ADMIN — DORZOLAMIDE HYDROCHLORIDE 1 DROP: 20 SOLUTION/ DROPS OPHTHALMIC at 09:53

## 2022-10-23 RX ADMIN — BRIMONIDINE TARTRATE 1 DROP: 2 SOLUTION OPHTHALMIC at 09:53

## 2022-10-23 RX ADMIN — INSULIN GLARGINE-YFGN 35 UNITS: 100 INJECTION, SOLUTION SUBCUTANEOUS at 09:53

## 2022-10-23 RX ADMIN — INSULIN LISPRO 5 UNITS: 100 INJECTION, SOLUTION INTRAVENOUS; SUBCUTANEOUS at 10:52

## 2022-10-23 RX ADMIN — INSULIN LISPRO 12 UNITS: 100 INJECTION, SOLUTION INTRAVENOUS; SUBCUTANEOUS at 09:53

## 2022-10-23 RX ADMIN — INSULIN LISPRO 12 UNITS: 100 INJECTION, SOLUTION INTRAVENOUS; SUBCUTANEOUS at 12:18

## 2022-10-23 RX ADMIN — CLONAZEPAM 0.5 MG: 0.5 TABLET ORAL at 12:18

## 2022-10-23 RX ADMIN — LOSARTAN POTASSIUM 100 MG: 100 TABLET, FILM COATED ORAL at 09:54

## 2022-10-23 RX ADMIN — INSULIN LISPRO 12 UNITS: 100 INJECTION, SOLUTION INTRAVENOUS; SUBCUTANEOUS at 18:21

## 2022-10-23 RX ADMIN — METHYLPREDNISOLONE SODIUM SUCCINATE 40 MG: 40 INJECTION, POWDER, FOR SOLUTION INTRAMUSCULAR; INTRAVENOUS at 04:58

## 2022-10-23 RX ADMIN — OLANZAPINE 5 MG: 10 INJECTION, POWDER, FOR SOLUTION INTRAMUSCULAR at 15:59

## 2022-10-23 RX ADMIN — MUPIROCIN 1 APPLICATION: 20 OINTMENT TOPICAL at 09:53

## 2022-10-23 RX ADMIN — BRIMONIDINE TARTRATE 1 DROP: 2 SOLUTION OPHTHALMIC at 22:18

## 2022-10-23 RX ADMIN — PAROXETINE HYDROCHLORIDE 10 MG: 10 TABLET, FILM COATED ORAL at 09:54

## 2022-10-23 RX ADMIN — MICONAZOLE NITRATE 1 APPLICATION: 20 CREAM TOPICAL at 09:00

## 2022-10-23 RX ADMIN — MICONAZOLE NITRATE 1 APPLICATION: 20 CREAM TOPICAL at 22:20

## 2022-10-23 RX ADMIN — NEBIVOLOL 10 MG: 10 TABLET ORAL at 12:19

## 2022-10-24 LAB
ANION GAP SERPL CALCULATED.3IONS-SCNC: 7 MMOL/L (ref 5–15)
BUN SERPL-MCNC: 23 MG/DL (ref 8–23)
BUN/CREAT SERPL: 28 (ref 7–25)
CALCIUM SPEC-SCNC: 8.7 MG/DL (ref 8.6–10.5)
CHLORIDE SERPL-SCNC: 103 MMOL/L (ref 98–107)
CO2 SERPL-SCNC: 30 MMOL/L (ref 22–29)
CREAT SERPL-MCNC: 0.82 MG/DL (ref 0.57–1)
DEPRECATED RDW RBC AUTO: 43.6 FL (ref 37–54)
EGFRCR SERPLBLD CKD-EPI 2021: 73.3 ML/MIN/1.73
ERYTHROCYTE [DISTWIDTH] IN BLOOD BY AUTOMATED COUNT: 13.2 % (ref 12.3–15.4)
GLUCOSE BLDC GLUCOMTR-MCNC: 104 MG/DL (ref 70–130)
GLUCOSE BLDC GLUCOMTR-MCNC: 130 MG/DL (ref 70–130)
GLUCOSE BLDC GLUCOMTR-MCNC: 190 MG/DL (ref 70–130)
GLUCOSE BLDC GLUCOMTR-MCNC: 288 MG/DL (ref 70–130)
GLUCOSE BLDC GLUCOMTR-MCNC: 51 MG/DL (ref 70–130)
GLUCOSE BLDC GLUCOMTR-MCNC: 58 MG/DL (ref 70–130)
GLUCOSE SERPL-MCNC: 101 MG/DL (ref 65–99)
HCT VFR BLD AUTO: 43.8 % (ref 34–46.6)
HGB BLD-MCNC: 14.1 G/DL (ref 12–15.9)
MCH RBC QN AUTO: 29 PG (ref 26.6–33)
MCHC RBC AUTO-ENTMCNC: 32.2 G/DL (ref 31.5–35.7)
MCV RBC AUTO: 89.9 FL (ref 79–97)
PLATELET # BLD AUTO: 210 10*3/MM3 (ref 140–450)
PMV BLD AUTO: 10.4 FL (ref 6–12)
POTASSIUM SERPL-SCNC: 4.3 MMOL/L (ref 3.5–5.2)
RBC # BLD AUTO: 4.87 10*6/MM3 (ref 3.77–5.28)
SODIUM SERPL-SCNC: 140 MMOL/L (ref 136–145)
WBC NRBC COR # BLD: 13.01 10*3/MM3 (ref 3.4–10.8)

## 2022-10-24 PROCEDURE — 80048 BASIC METABOLIC PNL TOTAL CA: CPT | Performed by: INTERNAL MEDICINE

## 2022-10-24 PROCEDURE — 63710000001 PREDNISONE PER 1 MG: Performed by: INTERNAL MEDICINE

## 2022-10-24 PROCEDURE — 97110 THERAPEUTIC EXERCISES: CPT

## 2022-10-24 PROCEDURE — 82962 GLUCOSE BLOOD TEST: CPT

## 2022-10-24 PROCEDURE — 85027 COMPLETE CBC AUTOMATED: CPT | Performed by: INTERNAL MEDICINE

## 2022-10-24 PROCEDURE — 63710000001 INSULIN LISPRO (HUMAN) PER 5 UNITS: Performed by: HOSPITALIST

## 2022-10-24 RX ADMIN — MUPIROCIN 1 APPLICATION: 20 OINTMENT TOPICAL at 09:42

## 2022-10-24 RX ADMIN — LOSARTAN POTASSIUM 100 MG: 100 TABLET, FILM COATED ORAL at 14:45

## 2022-10-24 RX ADMIN — DORZOLAMIDE HYDROCHLORIDE 1 DROP: 20 SOLUTION/ DROPS OPHTHALMIC at 14:34

## 2022-10-24 RX ADMIN — PREDNISONE 40 MG: 20 TABLET ORAL at 14:47

## 2022-10-24 RX ADMIN — BRIMONIDINE TARTRATE 1 DROP: 2 SOLUTION OPHTHALMIC at 14:34

## 2022-10-24 RX ADMIN — ATORVASTATIN CALCIUM 10 MG: 20 TABLET, FILM COATED ORAL at 23:20

## 2022-10-24 RX ADMIN — DIVALPROEX SODIUM 125 MG: 125 TABLET, DELAYED RELEASE ORAL at 14:48

## 2022-10-24 RX ADMIN — MUPIROCIN 1 APPLICATION: 20 OINTMENT TOPICAL at 23:33

## 2022-10-24 RX ADMIN — MICONAZOLE NITRATE 1 APPLICATION: 20 CREAM TOPICAL at 14:34

## 2022-10-24 RX ADMIN — NEBIVOLOL 10 MG: 10 TABLET ORAL at 14:46

## 2022-10-24 RX ADMIN — INSULIN LISPRO 2 UNITS: 100 INJECTION, SOLUTION INTRAVENOUS; SUBCUTANEOUS at 23:21

## 2022-10-24 RX ADMIN — PANTOPRAZOLE SODIUM 40 MG: 40 TABLET, DELAYED RELEASE ORAL at 06:14

## 2022-10-24 RX ADMIN — DORZOLAMIDE HYDROCHLORIDE 1 DROP: 20 SOLUTION/ DROPS OPHTHALMIC at 23:21

## 2022-10-24 RX ADMIN — PAROXETINE HYDROCHLORIDE 10 MG: 10 TABLET, FILM COATED ORAL at 15:17

## 2022-10-24 RX ADMIN — DEXTROSE MONOHYDRATE 25 G: 25 INJECTION, SOLUTION INTRAVENOUS at 11:25

## 2022-10-24 RX ADMIN — INSULIN LISPRO 4 UNITS: 100 INJECTION, SOLUTION INTRAVENOUS; SUBCUTANEOUS at 19:01

## 2022-10-24 RX ADMIN — MICONAZOLE NITRATE 1 APPLICATION: 20 CREAM TOPICAL at 23:33

## 2022-10-24 RX ADMIN — BRIMONIDINE TARTRATE 1 DROP: 2 SOLUTION OPHTHALMIC at 23:21

## 2022-10-25 ENCOUNTER — APPOINTMENT (OUTPATIENT)
Dept: GENERAL RADIOLOGY | Facility: HOSPITAL | Age: 77
End: 2022-10-25

## 2022-10-25 LAB
DEPRECATED RDW RBC AUTO: 40.8 FL (ref 37–54)
ERYTHROCYTE [DISTWIDTH] IN BLOOD BY AUTOMATED COUNT: 12.9 % (ref 12.3–15.4)
GLUCOSE BLDC GLUCOMTR-MCNC: 125 MG/DL (ref 70–130)
GLUCOSE BLDC GLUCOMTR-MCNC: 156 MG/DL (ref 70–130)
GLUCOSE BLDC GLUCOMTR-MCNC: 53 MG/DL (ref 70–130)
GLUCOSE BLDC GLUCOMTR-MCNC: 95 MG/DL (ref 70–130)
HCT VFR BLD AUTO: 45.1 % (ref 34–46.6)
HGB BLD-MCNC: 15.6 G/DL (ref 12–15.9)
MCH RBC QN AUTO: 29.7 PG (ref 26.6–33)
MCHC RBC AUTO-ENTMCNC: 34.6 G/DL (ref 31.5–35.7)
MCV RBC AUTO: 85.9 FL (ref 79–97)
PLATELET # BLD AUTO: 230 10*3/MM3 (ref 140–450)
PMV BLD AUTO: 10.1 FL (ref 6–12)
RBC # BLD AUTO: 5.25 10*6/MM3 (ref 3.77–5.28)
WBC NRBC COR # BLD: 14.88 10*3/MM3 (ref 3.4–10.8)

## 2022-10-25 PROCEDURE — 73110 X-RAY EXAM OF WRIST: CPT

## 2022-10-25 PROCEDURE — 85027 COMPLETE CBC AUTOMATED: CPT | Performed by: INTERNAL MEDICINE

## 2022-10-25 PROCEDURE — 73521 X-RAY EXAM HIPS BI 2 VIEWS: CPT

## 2022-10-25 PROCEDURE — 63710000001 PREDNISONE PER 5 MG: Performed by: INTERNAL MEDICINE

## 2022-10-25 PROCEDURE — 73020 X-RAY EXAM OF SHOULDER: CPT

## 2022-10-25 PROCEDURE — 63710000001 PREDNISONE PER 1 MG: Performed by: INTERNAL MEDICINE

## 2022-10-25 PROCEDURE — 73120 X-RAY EXAM OF HAND: CPT

## 2022-10-25 PROCEDURE — 82962 GLUCOSE BLOOD TEST: CPT

## 2022-10-25 RX ORDER — NEBIVOLOL 10 MG/1
10 TABLET ORAL
Qty: 30 TABLET | Refills: 3 | Status: SHIPPED | OUTPATIENT
Start: 2022-10-25 | End: 2022-11-06 | Stop reason: HOSPADM

## 2022-10-25 RX ORDER — CLONAZEPAM 1 MG/1
1 TABLET ORAL EVERY 12 HOURS SCHEDULED
Status: DISCONTINUED | OUTPATIENT
Start: 2022-10-25 | End: 2022-10-30

## 2022-10-25 RX ORDER — PREDNISONE 10 MG/1
30 TABLET ORAL
Qty: 12 TABLET | Refills: 0 | Status: SHIPPED | OUTPATIENT
Start: 2022-10-25 | End: 2022-11-06 | Stop reason: HOSPADM

## 2022-10-25 RX ORDER — PREDNISONE 10 MG/1
TABLET ORAL
Qty: 15 TABLET | Refills: 0 | Status: SHIPPED | OUTPATIENT
Start: 2022-10-30 | End: 2022-11-06 | Stop reason: HOSPADM

## 2022-10-25 RX ORDER — PANTOPRAZOLE SODIUM 40 MG/1
40 TABLET, DELAYED RELEASE ORAL
Qty: 30 TABLET | Refills: 0 | Status: SHIPPED | OUTPATIENT
Start: 2022-10-26

## 2022-10-25 RX ORDER — CLONAZEPAM 0.5 MG/1
0.5 TABLET ORAL 3 TIMES DAILY PRN
Qty: 30 TABLET | Refills: 0 | Status: SHIPPED | OUTPATIENT
Start: 2022-10-25

## 2022-10-25 RX ORDER — INSULIN LISPRO 100 [IU]/ML
12 INJECTION, SOLUTION INTRAVENOUS; SUBCUTANEOUS
Qty: 10 ML | Refills: 12 | Status: SHIPPED | OUTPATIENT
Start: 2022-10-25 | End: 2022-11-06 | Stop reason: HOSPADM

## 2022-10-25 RX ORDER — LOSARTAN POTASSIUM 100 MG/1
100 TABLET ORAL
Qty: 30 TABLET | Refills: 3 | Status: SHIPPED | OUTPATIENT
Start: 2022-10-25

## 2022-10-25 RX ORDER — INSULIN LISPRO 100 [IU]/ML
0-7 INJECTION, SOLUTION INTRAVENOUS; SUBCUTANEOUS
Qty: 10 ML | Refills: 12 | Status: SHIPPED | OUTPATIENT
Start: 2022-10-25

## 2022-10-25 RX ORDER — DEXTROSE AND SODIUM CHLORIDE 5; .45 G/100ML; G/100ML
100 INJECTION, SOLUTION INTRAVENOUS CONTINUOUS
Status: DISCONTINUED | OUTPATIENT
Start: 2022-10-25 | End: 2022-10-29

## 2022-10-25 RX ADMIN — DORZOLAMIDE HYDROCHLORIDE 1 DROP: 20 SOLUTION/ DROPS OPHTHALMIC at 21:23

## 2022-10-25 RX ADMIN — CLONAZEPAM 0.5 MG: 0.5 TABLET ORAL at 09:17

## 2022-10-25 RX ADMIN — DORZOLAMIDE HYDROCHLORIDE 1 DROP: 20 SOLUTION/ DROPS OPHTHALMIC at 09:19

## 2022-10-25 RX ADMIN — PANTOPRAZOLE SODIUM 40 MG: 40 TABLET, DELAYED RELEASE ORAL at 06:20

## 2022-10-25 RX ADMIN — BRIMONIDINE TARTRATE 1 DROP: 2 SOLUTION OPHTHALMIC at 21:23

## 2022-10-25 RX ADMIN — CLONAZEPAM 1 MG: 1 TABLET ORAL at 21:23

## 2022-10-25 RX ADMIN — MICONAZOLE NITRATE 1 APPLICATION: 20 CREAM TOPICAL at 21:30

## 2022-10-25 RX ADMIN — INSULIN GLARGINE-YFGN 35 UNITS: 100 INJECTION, SOLUTION SUBCUTANEOUS at 09:27

## 2022-10-25 RX ADMIN — OLANZAPINE 5 MG: 10 INJECTION, POWDER, FOR SOLUTION INTRAMUSCULAR at 16:20

## 2022-10-25 RX ADMIN — MICONAZOLE NITRATE 1 APPLICATION: 20 CREAM TOPICAL at 16:21

## 2022-10-25 RX ADMIN — PREDNISONE 30 MG: 20 TABLET ORAL at 09:17

## 2022-10-25 RX ADMIN — MUPIROCIN 1 APPLICATION: 20 OINTMENT TOPICAL at 16:21

## 2022-10-25 RX ADMIN — DEXTROSE AND SODIUM CHLORIDE 100 ML/HR: 5; 450 INJECTION, SOLUTION INTRAVENOUS at 22:02

## 2022-10-25 RX ADMIN — ATORVASTATIN CALCIUM 10 MG: 20 TABLET, FILM COATED ORAL at 21:22

## 2022-10-25 RX ADMIN — BRIMONIDINE TARTRATE 1 DROP: 2 SOLUTION OPHTHALMIC at 09:19

## 2022-10-26 ENCOUNTER — APPOINTMENT (OUTPATIENT)
Dept: CT IMAGING | Facility: HOSPITAL | Age: 77
End: 2022-10-26

## 2022-10-26 LAB
ANION GAP SERPL CALCULATED.3IONS-SCNC: 9.6 MMOL/L (ref 5–15)
BACTERIA UR QL AUTO: ABNORMAL /HPF
BILIRUB UR QL STRIP: NEGATIVE
BUN SERPL-MCNC: 19 MG/DL (ref 8–23)
BUN/CREAT SERPL: 29.2 (ref 7–25)
CALCIUM SPEC-SCNC: 8.4 MG/DL (ref 8.6–10.5)
CHLORIDE SERPL-SCNC: 104 MMOL/L (ref 98–107)
CLARITY UR: ABNORMAL
CO2 SERPL-SCNC: 24.4 MMOL/L (ref 22–29)
COLOR UR: YELLOW
CREAT SERPL-MCNC: 0.65 MG/DL (ref 0.57–1)
DEPRECATED RDW RBC AUTO: 43.9 FL (ref 37–54)
EGFRCR SERPLBLD CKD-EPI 2021: 90.2 ML/MIN/1.73
ERYTHROCYTE [DISTWIDTH] IN BLOOD BY AUTOMATED COUNT: 13.2 % (ref 12.3–15.4)
GLUCOSE BLDC GLUCOMTR-MCNC: 109 MG/DL (ref 70–130)
GLUCOSE BLDC GLUCOMTR-MCNC: 243 MG/DL (ref 70–130)
GLUCOSE BLDC GLUCOMTR-MCNC: 73 MG/DL (ref 70–130)
GLUCOSE BLDC GLUCOMTR-MCNC: 91 MG/DL (ref 70–130)
GLUCOSE SERPL-MCNC: 78 MG/DL (ref 65–99)
GLUCOSE UR STRIP-MCNC: NEGATIVE MG/DL
HCT VFR BLD AUTO: 44.2 % (ref 34–46.6)
HGB BLD-MCNC: 14 G/DL (ref 12–15.9)
HGB UR QL STRIP.AUTO: ABNORMAL
HYALINE CASTS UR QL AUTO: ABNORMAL /LPF
KETONES UR QL STRIP: NEGATIVE
LEUKOCYTE ESTERASE UR QL STRIP.AUTO: ABNORMAL
MCH RBC QN AUTO: 28.9 PG (ref 26.6–33)
MCHC RBC AUTO-ENTMCNC: 31.7 G/DL (ref 31.5–35.7)
MCV RBC AUTO: 91.3 FL (ref 79–97)
NITRITE UR QL STRIP: NEGATIVE
PH UR STRIP.AUTO: 7.5 [PH] (ref 5–8)
PLATELET # BLD AUTO: 185 10*3/MM3 (ref 140–450)
PMV BLD AUTO: 11.1 FL (ref 6–12)
POTASSIUM SERPL-SCNC: 3.7 MMOL/L (ref 3.5–5.2)
PROT UR QL STRIP: NEGATIVE
RBC # BLD AUTO: 4.84 10*6/MM3 (ref 3.77–5.28)
RBC # UR STRIP: ABNORMAL /HPF
REF LAB TEST METHOD: ABNORMAL
SODIUM SERPL-SCNC: 138 MMOL/L (ref 136–145)
SP GR UR STRIP: 1.01 (ref 1–1.03)
SQUAMOUS #/AREA URNS HPF: ABNORMAL /HPF
UROBILINOGEN UR QL STRIP: ABNORMAL
WBC # UR STRIP: ABNORMAL /HPF
WBC NRBC COR # BLD: 14.17 10*3/MM3 (ref 3.4–10.8)

## 2022-10-26 PROCEDURE — 82962 GLUCOSE BLOOD TEST: CPT

## 2022-10-26 PROCEDURE — 25010000002 ENOXAPARIN PER 10 MG: Performed by: INTERNAL MEDICINE

## 2022-10-26 PROCEDURE — 63710000001 PREDNISONE PER 1 MG: Performed by: INTERNAL MEDICINE

## 2022-10-26 PROCEDURE — 87186 SC STD MICRODIL/AGAR DIL: CPT | Performed by: INTERNAL MEDICINE

## 2022-10-26 PROCEDURE — 80048 BASIC METABOLIC PNL TOTAL CA: CPT | Performed by: INTERNAL MEDICINE

## 2022-10-26 PROCEDURE — 63710000001 PREDNISONE PER 5 MG: Performed by: INTERNAL MEDICINE

## 2022-10-26 PROCEDURE — 97530 THERAPEUTIC ACTIVITIES: CPT

## 2022-10-26 PROCEDURE — 63710000001 INSULIN LISPRO (HUMAN) PER 5 UNITS: Performed by: HOSPITALIST

## 2022-10-26 PROCEDURE — 70450 CT HEAD/BRAIN W/O DYE: CPT

## 2022-10-26 PROCEDURE — 87088 URINE BACTERIA CULTURE: CPT | Performed by: INTERNAL MEDICINE

## 2022-10-26 PROCEDURE — 85027 COMPLETE CBC AUTOMATED: CPT | Performed by: INTERNAL MEDICINE

## 2022-10-26 PROCEDURE — 87086 URINE CULTURE/COLONY COUNT: CPT | Performed by: INTERNAL MEDICINE

## 2022-10-26 PROCEDURE — 25010000002 CEFTRIAXONE PER 250 MG: Performed by: INTERNAL MEDICINE

## 2022-10-26 PROCEDURE — 81001 URINALYSIS AUTO W/SCOPE: CPT | Performed by: INTERNAL MEDICINE

## 2022-10-26 RX ORDER — ENOXAPARIN SODIUM 100 MG/ML
40 INJECTION SUBCUTANEOUS EVERY 24 HOURS
Status: DISCONTINUED | OUTPATIENT
Start: 2022-10-26 | End: 2022-11-06 | Stop reason: HOSPADM

## 2022-10-26 RX ADMIN — MICONAZOLE NITRATE 1 APPLICATION: 20 CREAM TOPICAL at 21:44

## 2022-10-26 RX ADMIN — NEBIVOLOL 10 MG: 10 TABLET ORAL at 08:31

## 2022-10-26 RX ADMIN — CLONAZEPAM 1 MG: 1 TABLET ORAL at 10:05

## 2022-10-26 RX ADMIN — PAROXETINE HYDROCHLORIDE 10 MG: 10 TABLET, FILM COATED ORAL at 08:31

## 2022-10-26 RX ADMIN — DORZOLAMIDE HYDROCHLORIDE 1 DROP: 20 SOLUTION/ DROPS OPHTHALMIC at 08:31

## 2022-10-26 RX ADMIN — DEXTROSE AND SODIUM CHLORIDE 100 ML/HR: 5; 450 INJECTION, SOLUTION INTRAVENOUS at 12:21

## 2022-10-26 RX ADMIN — CEFTRIAXONE SODIUM 1 G: 1 INJECTION, POWDER, FOR SOLUTION INTRAMUSCULAR; INTRAVENOUS at 20:02

## 2022-10-26 RX ADMIN — MUPIROCIN 1 APPLICATION: 20 OINTMENT TOPICAL at 10:05

## 2022-10-26 RX ADMIN — LOSARTAN POTASSIUM 100 MG: 100 TABLET, FILM COATED ORAL at 08:31

## 2022-10-26 RX ADMIN — ATORVASTATIN CALCIUM 10 MG: 20 TABLET, FILM COATED ORAL at 21:43

## 2022-10-26 RX ADMIN — ENOXAPARIN SODIUM 40 MG: 100 INJECTION SUBCUTANEOUS at 21:43

## 2022-10-26 RX ADMIN — BRIMONIDINE TARTRATE 1 DROP: 2 SOLUTION OPHTHALMIC at 21:44

## 2022-10-26 RX ADMIN — DEXTROSE AND SODIUM CHLORIDE 100 ML/HR: 5; 450 INJECTION, SOLUTION INTRAVENOUS at 22:13

## 2022-10-26 RX ADMIN — MUPIROCIN 1 APPLICATION: 20 OINTMENT TOPICAL at 21:44

## 2022-10-26 RX ADMIN — DORZOLAMIDE HYDROCHLORIDE 1 DROP: 20 SOLUTION/ DROPS OPHTHALMIC at 21:43

## 2022-10-26 RX ADMIN — BRIMONIDINE TARTRATE 1 DROP: 2 SOLUTION OPHTHALMIC at 08:31

## 2022-10-26 RX ADMIN — INSULIN LISPRO 3 UNITS: 100 INJECTION, SOLUTION INTRAVENOUS; SUBCUTANEOUS at 22:10

## 2022-10-26 RX ADMIN — MUPIROCIN 1 APPLICATION: 20 OINTMENT TOPICAL at 00:36

## 2022-10-26 RX ADMIN — MICONAZOLE NITRATE 1 APPLICATION: 20 CREAM TOPICAL at 08:33

## 2022-10-26 RX ADMIN — PREDNISONE 30 MG: 20 TABLET ORAL at 08:31

## 2022-10-26 RX ADMIN — CLONAZEPAM 1 MG: 1 TABLET ORAL at 21:43

## 2022-10-27 LAB
ANION GAP SERPL CALCULATED.3IONS-SCNC: 8.7 MMOL/L (ref 5–15)
BACTERIA SPEC AEROBE CULT: ABNORMAL
BUN SERPL-MCNC: 18 MG/DL (ref 8–23)
BUN/CREAT SERPL: 24.3 (ref 7–25)
CALCIUM SPEC-SCNC: 8.2 MG/DL (ref 8.6–10.5)
CHLORIDE SERPL-SCNC: 106 MMOL/L (ref 98–107)
CO2 SERPL-SCNC: 21.3 MMOL/L (ref 22–29)
CREAT SERPL-MCNC: 0.74 MG/DL (ref 0.57–1)
DEPRECATED RDW RBC AUTO: 41.9 FL (ref 37–54)
EGFRCR SERPLBLD CKD-EPI 2021: 82.9 ML/MIN/1.73
ERYTHROCYTE [DISTWIDTH] IN BLOOD BY AUTOMATED COUNT: 12.9 % (ref 12.3–15.4)
GLUCOSE BLDC GLUCOMTR-MCNC: 143 MG/DL (ref 70–130)
GLUCOSE BLDC GLUCOMTR-MCNC: 159 MG/DL (ref 70–130)
GLUCOSE BLDC GLUCOMTR-MCNC: 163 MG/DL (ref 70–130)
GLUCOSE BLDC GLUCOMTR-MCNC: 226 MG/DL (ref 70–130)
GLUCOSE SERPL-MCNC: 243 MG/DL (ref 65–99)
HCT VFR BLD AUTO: 43.1 % (ref 34–46.6)
HGB BLD-MCNC: 14.5 G/DL (ref 12–15.9)
MCH RBC QN AUTO: 29.9 PG (ref 26.6–33)
MCHC RBC AUTO-ENTMCNC: 33.6 G/DL (ref 31.5–35.7)
MCV RBC AUTO: 88.9 FL (ref 79–97)
PLATELET # BLD AUTO: 189 10*3/MM3 (ref 140–450)
PMV BLD AUTO: 10.5 FL (ref 6–12)
POTASSIUM SERPL-SCNC: 4.1 MMOL/L (ref 3.5–5.2)
RBC # BLD AUTO: 4.85 10*6/MM3 (ref 3.77–5.28)
SODIUM SERPL-SCNC: 136 MMOL/L (ref 136–145)
WBC NRBC COR # BLD: 9.04 10*3/MM3 (ref 3.4–10.8)

## 2022-10-27 PROCEDURE — 80048 BASIC METABOLIC PNL TOTAL CA: CPT | Performed by: INTERNAL MEDICINE

## 2022-10-27 PROCEDURE — 25010000002 CEFTRIAXONE PER 250 MG: Performed by: INTERNAL MEDICINE

## 2022-10-27 PROCEDURE — 85027 COMPLETE CBC AUTOMATED: CPT | Performed by: INTERNAL MEDICINE

## 2022-10-27 PROCEDURE — 82962 GLUCOSE BLOOD TEST: CPT

## 2022-10-27 PROCEDURE — 25010000002 ENOXAPARIN PER 10 MG: Performed by: INTERNAL MEDICINE

## 2022-10-27 RX ADMIN — DEXTROSE AND SODIUM CHLORIDE 100 ML/HR: 5; 450 INJECTION, SOLUTION INTRAVENOUS at 10:52

## 2022-10-27 RX ADMIN — OLANZAPINE 5 MG: 10 INJECTION, POWDER, FOR SOLUTION INTRAMUSCULAR at 10:51

## 2022-10-27 RX ADMIN — CEFTRIAXONE SODIUM 1 G: 1 INJECTION, POWDER, FOR SOLUTION INTRAMUSCULAR; INTRAVENOUS at 23:11

## 2022-10-27 RX ADMIN — CLONAZEPAM 1 MG: 1 TABLET ORAL at 20:28

## 2022-10-27 RX ADMIN — ENOXAPARIN SODIUM 40 MG: 100 INJECTION SUBCUTANEOUS at 23:14

## 2022-10-28 LAB
ALBUMIN SERPL-MCNC: 2.9 G/DL (ref 3.5–5.2)
ALBUMIN/GLOB SERPL: 1.1 G/DL
ALP SERPL-CCNC: 91 U/L (ref 39–117)
ALT SERPL W P-5'-P-CCNC: 17 U/L (ref 1–33)
ANION GAP SERPL CALCULATED.3IONS-SCNC: 7 MMOL/L (ref 5–15)
AST SERPL-CCNC: 11 U/L (ref 1–32)
BILIRUB SERPL-MCNC: 0.6 MG/DL (ref 0–1.2)
BUN SERPL-MCNC: 13 MG/DL (ref 8–23)
BUN/CREAT SERPL: 20.3 (ref 7–25)
CALCIUM SPEC-SCNC: 8.1 MG/DL (ref 8.6–10.5)
CHLORIDE SERPL-SCNC: 104 MMOL/L (ref 98–107)
CO2 SERPL-SCNC: 23 MMOL/L (ref 22–29)
CREAT SERPL-MCNC: 0.64 MG/DL (ref 0.57–1)
DEPRECATED RDW RBC AUTO: 42.3 FL (ref 37–54)
EGFRCR SERPLBLD CKD-EPI 2021: 90.6 ML/MIN/1.73
ERYTHROCYTE [DISTWIDTH] IN BLOOD BY AUTOMATED COUNT: 12.9 % (ref 12.3–15.4)
GLOBULIN UR ELPH-MCNC: 2.6 GM/DL
GLUCOSE BLDC GLUCOMTR-MCNC: 159 MG/DL (ref 70–130)
GLUCOSE BLDC GLUCOMTR-MCNC: 162 MG/DL (ref 70–130)
GLUCOSE BLDC GLUCOMTR-MCNC: 202 MG/DL (ref 70–130)
GLUCOSE BLDC GLUCOMTR-MCNC: 205 MG/DL (ref 70–130)
GLUCOSE SERPL-MCNC: 211 MG/DL (ref 65–99)
HCT VFR BLD AUTO: 42.2 % (ref 34–46.6)
HGB BLD-MCNC: 13.9 G/DL (ref 12–15.9)
MCH RBC QN AUTO: 29.8 PG (ref 26.6–33)
MCHC RBC AUTO-ENTMCNC: 32.9 G/DL (ref 31.5–35.7)
MCV RBC AUTO: 90.6 FL (ref 79–97)
PLATELET # BLD AUTO: 174 10*3/MM3 (ref 140–450)
PMV BLD AUTO: 10.2 FL (ref 6–12)
POTASSIUM SERPL-SCNC: 4 MMOL/L (ref 3.5–5.2)
PROT SERPL-MCNC: 5.5 G/DL (ref 6–8.5)
RBC # BLD AUTO: 4.66 10*6/MM3 (ref 3.77–5.28)
SODIUM SERPL-SCNC: 134 MMOL/L (ref 136–145)
WBC NRBC COR # BLD: 8.03 10*3/MM3 (ref 3.4–10.8)

## 2022-10-28 PROCEDURE — 85027 COMPLETE CBC AUTOMATED: CPT | Performed by: INTERNAL MEDICINE

## 2022-10-28 PROCEDURE — 63710000001 INSULIN LISPRO (HUMAN) PER 5 UNITS: Performed by: HOSPITALIST

## 2022-10-28 PROCEDURE — 63710000001 PREDNISONE PER 1 MG: Performed by: INTERNAL MEDICINE

## 2022-10-28 PROCEDURE — 97530 THERAPEUTIC ACTIVITIES: CPT

## 2022-10-28 PROCEDURE — 63710000001 PREDNISONE PER 5 MG: Performed by: INTERNAL MEDICINE

## 2022-10-28 PROCEDURE — 82962 GLUCOSE BLOOD TEST: CPT

## 2022-10-28 PROCEDURE — 80053 COMPREHEN METABOLIC PANEL: CPT | Performed by: INTERNAL MEDICINE

## 2022-10-28 PROCEDURE — 25010000002 CEFAZOLIN IN DEXTROSE 2-4 GM/100ML-% SOLUTION: Performed by: INTERNAL MEDICINE

## 2022-10-28 PROCEDURE — 25010000002 ENOXAPARIN PER 10 MG: Performed by: INTERNAL MEDICINE

## 2022-10-28 RX ORDER — OLANZAPINE 5 MG/1
5 TABLET ORAL NIGHTLY
Status: DISCONTINUED | OUTPATIENT
Start: 2022-10-28 | End: 2022-10-30

## 2022-10-28 RX ORDER — CEFAZOLIN SODIUM 2 G/100ML
2 INJECTION, SOLUTION INTRAVENOUS EVERY 8 HOURS
Status: DISCONTINUED | OUTPATIENT
Start: 2022-10-28 | End: 2022-10-31

## 2022-10-28 RX ADMIN — ATORVASTATIN CALCIUM 10 MG: 20 TABLET, FILM COATED ORAL at 21:34

## 2022-10-28 RX ADMIN — LOSARTAN POTASSIUM 100 MG: 100 TABLET, FILM COATED ORAL at 15:20

## 2022-10-28 RX ADMIN — PAROXETINE HYDROCHLORIDE 10 MG: 10 TABLET, FILM COATED ORAL at 15:20

## 2022-10-28 RX ADMIN — NEBIVOLOL 10 MG: 10 TABLET ORAL at 15:20

## 2022-10-28 RX ADMIN — INSULIN LISPRO 3 UNITS: 100 INJECTION, SOLUTION INTRAVENOUS; SUBCUTANEOUS at 21:41

## 2022-10-28 RX ADMIN — ENOXAPARIN SODIUM 40 MG: 100 INJECTION SUBCUTANEOUS at 21:05

## 2022-10-28 RX ADMIN — MICONAZOLE NITRATE 1 APPLICATION: 20 CREAM TOPICAL at 15:24

## 2022-10-28 RX ADMIN — BRIMONIDINE TARTRATE 1 DROP: 2 SOLUTION OPHTHALMIC at 21:04

## 2022-10-28 RX ADMIN — DEXTROSE AND SODIUM CHLORIDE 100 ML/HR: 5; 450 INJECTION, SOLUTION INTRAVENOUS at 04:43

## 2022-10-28 RX ADMIN — MUPIROCIN 1 APPLICATION: 20 OINTMENT TOPICAL at 21:04

## 2022-10-28 RX ADMIN — CEFAZOLIN SODIUM 2 G: 2 INJECTION, SOLUTION INTRAVENOUS at 21:04

## 2022-10-28 RX ADMIN — DORZOLAMIDE HYDROCHLORIDE 1 DROP: 20 SOLUTION/ DROPS OPHTHALMIC at 21:04

## 2022-10-28 RX ADMIN — PREDNISONE 30 MG: 20 TABLET ORAL at 15:20

## 2022-10-28 RX ADMIN — CLONAZEPAM 1 MG: 1 TABLET ORAL at 15:20

## 2022-10-28 RX ADMIN — MICONAZOLE NITRATE 1 APPLICATION: 20 CREAM TOPICAL at 21:05

## 2022-10-28 RX ADMIN — MUPIROCIN 1 APPLICATION: 20 OINTMENT TOPICAL at 15:12

## 2022-10-28 RX ADMIN — OLANZAPINE 5 MG: 5 TABLET ORAL at 21:34

## 2022-10-29 LAB
GLUCOSE BLDC GLUCOMTR-MCNC: 185 MG/DL (ref 70–130)
GLUCOSE BLDC GLUCOMTR-MCNC: 233 MG/DL (ref 70–130)
GLUCOSE BLDC GLUCOMTR-MCNC: 70 MG/DL (ref 70–130)

## 2022-10-29 PROCEDURE — 25010000002 ENOXAPARIN PER 10 MG: Performed by: INTERNAL MEDICINE

## 2022-10-29 PROCEDURE — 63710000001 PREDNISONE PER 1 MG: Performed by: INTERNAL MEDICINE

## 2022-10-29 PROCEDURE — 25010000002 CEFAZOLIN IN DEXTROSE 2-4 GM/100ML-% SOLUTION: Performed by: INTERNAL MEDICINE

## 2022-10-29 PROCEDURE — 82962 GLUCOSE BLOOD TEST: CPT

## 2022-10-29 RX ORDER — NEBIVOLOL 5 MG/1
5 TABLET ORAL
Status: DISCONTINUED | OUTPATIENT
Start: 2022-10-30 | End: 2022-10-30

## 2022-10-29 RX ORDER — INSULIN LISPRO 100 [IU]/ML
14 INJECTION, SOLUTION INTRAVENOUS; SUBCUTANEOUS
Status: DISCONTINUED | OUTPATIENT
Start: 2022-10-29 | End: 2022-10-30

## 2022-10-29 RX ORDER — PREDNISONE 20 MG/1
20 TABLET ORAL
Status: DISCONTINUED | OUTPATIENT
Start: 2022-10-30 | End: 2022-11-02

## 2022-10-29 RX ADMIN — CEFAZOLIN SODIUM 2 G: 2 INJECTION, SOLUTION INTRAVENOUS at 12:55

## 2022-10-29 RX ADMIN — DORZOLAMIDE HYDROCHLORIDE 1 DROP: 20 SOLUTION/ DROPS OPHTHALMIC at 20:45

## 2022-10-29 RX ADMIN — NEBIVOLOL 10 MG: 10 TABLET ORAL at 12:56

## 2022-10-29 RX ADMIN — OLANZAPINE 5 MG: 10 INJECTION, POWDER, FOR SOLUTION INTRAMUSCULAR at 16:11

## 2022-10-29 RX ADMIN — CLONAZEPAM 1 MG: 1 TABLET ORAL at 07:31

## 2022-10-29 RX ADMIN — INSULIN GLARGINE-YFGN 35 UNITS: 100 INJECTION, SOLUTION SUBCUTANEOUS at 07:32

## 2022-10-29 RX ADMIN — DEXTROSE AND SODIUM CHLORIDE 100 ML/HR: 5; 450 INJECTION, SOLUTION INTRAVENOUS at 07:31

## 2022-10-29 RX ADMIN — PREDNISONE 30 MG: 20 TABLET ORAL at 12:57

## 2022-10-29 RX ADMIN — ENOXAPARIN SODIUM 40 MG: 100 INJECTION SUBCUTANEOUS at 20:44

## 2022-10-29 RX ADMIN — CEFAZOLIN SODIUM 2 G: 2 INJECTION, SOLUTION INTRAVENOUS at 20:44

## 2022-10-29 RX ADMIN — MUPIROCIN 1 APPLICATION: 20 OINTMENT TOPICAL at 12:55

## 2022-10-29 RX ADMIN — MICONAZOLE NITRATE 1 APPLICATION: 20 CREAM TOPICAL at 22:00

## 2022-10-29 RX ADMIN — MUPIROCIN 1 APPLICATION: 20 OINTMENT TOPICAL at 20:45

## 2022-10-29 RX ADMIN — CEFAZOLIN SODIUM 2 G: 2 INJECTION, SOLUTION INTRAVENOUS at 04:17

## 2022-10-29 RX ADMIN — BRIMONIDINE TARTRATE 1 DROP: 2 SOLUTION OPHTHALMIC at 20:45

## 2022-10-29 RX ADMIN — MICONAZOLE NITRATE 1 APPLICATION: 20 CREAM TOPICAL at 12:57

## 2022-10-30 LAB
ANION GAP SERPL CALCULATED.3IONS-SCNC: 8.3 MMOL/L (ref 5–15)
BASOPHILS # BLD AUTO: 0.02 10*3/MM3 (ref 0–0.2)
BASOPHILS NFR BLD AUTO: 0.3 % (ref 0–1.5)
BUN SERPL-MCNC: 13 MG/DL (ref 8–23)
BUN/CREAT SERPL: 18.6 (ref 7–25)
CALCIUM SPEC-SCNC: 8.7 MG/DL (ref 8.6–10.5)
CHLORIDE SERPL-SCNC: 108 MMOL/L (ref 98–107)
CO2 SERPL-SCNC: 22.7 MMOL/L (ref 22–29)
CREAT SERPL-MCNC: 0.7 MG/DL (ref 0.57–1)
DEPRECATED RDW RBC AUTO: 40.7 FL (ref 37–54)
EGFRCR SERPLBLD CKD-EPI 2021: 88.7 ML/MIN/1.73
EOSINOPHIL # BLD AUTO: 0.15 10*3/MM3 (ref 0–0.4)
EOSINOPHIL NFR BLD AUTO: 2 % (ref 0.3–6.2)
ERYTHROCYTE [DISTWIDTH] IN BLOOD BY AUTOMATED COUNT: 12.8 % (ref 12.3–15.4)
GLUCOSE BLDC GLUCOMTR-MCNC: 43 MG/DL (ref 70–130)
GLUCOSE BLDC GLUCOMTR-MCNC: 56 MG/DL (ref 70–130)
GLUCOSE BLDC GLUCOMTR-MCNC: 74 MG/DL (ref 70–130)
GLUCOSE BLDC GLUCOMTR-MCNC: 76 MG/DL (ref 70–130)
GLUCOSE BLDC GLUCOMTR-MCNC: 87 MG/DL (ref 70–130)
GLUCOSE SERPL-MCNC: 54 MG/DL (ref 65–99)
HCT VFR BLD AUTO: 40.7 % (ref 34–46.6)
HGB BLD-MCNC: 13.8 G/DL (ref 12–15.9)
IMM GRANULOCYTES # BLD AUTO: 0.03 10*3/MM3 (ref 0–0.05)
IMM GRANULOCYTES NFR BLD AUTO: 0.4 % (ref 0–0.5)
LYMPHOCYTES # BLD AUTO: 0.9 10*3/MM3 (ref 0.7–3.1)
LYMPHOCYTES NFR BLD AUTO: 12.2 % (ref 19.6–45.3)
MCH RBC QN AUTO: 29.6 PG (ref 26.6–33)
MCHC RBC AUTO-ENTMCNC: 33.9 G/DL (ref 31.5–35.7)
MCV RBC AUTO: 87.3 FL (ref 79–97)
MONOCYTES # BLD AUTO: 0.67 10*3/MM3 (ref 0.1–0.9)
MONOCYTES NFR BLD AUTO: 9.1 % (ref 5–12)
NEUTROPHILS NFR BLD AUTO: 5.62 10*3/MM3 (ref 1.7–7)
NEUTROPHILS NFR BLD AUTO: 76 % (ref 42.7–76)
NRBC BLD AUTO-RTO: 0 /100 WBC (ref 0–0.2)
PLATELET # BLD AUTO: 185 10*3/MM3 (ref 140–450)
PMV BLD AUTO: 10.5 FL (ref 6–12)
POTASSIUM SERPL-SCNC: 3.3 MMOL/L (ref 3.5–5.2)
RBC # BLD AUTO: 4.66 10*6/MM3 (ref 3.77–5.28)
SODIUM SERPL-SCNC: 139 MMOL/L (ref 136–145)
WBC NRBC COR # BLD: 7.39 10*3/MM3 (ref 3.4–10.8)

## 2022-10-30 PROCEDURE — 80048 BASIC METABOLIC PNL TOTAL CA: CPT | Performed by: HOSPITALIST

## 2022-10-30 PROCEDURE — 82962 GLUCOSE BLOOD TEST: CPT

## 2022-10-30 PROCEDURE — 85025 COMPLETE CBC W/AUTO DIFF WBC: CPT | Performed by: HOSPITALIST

## 2022-10-30 PROCEDURE — 25010000002 ENOXAPARIN PER 10 MG: Performed by: INTERNAL MEDICINE

## 2022-10-30 PROCEDURE — 25010000002 HYDRALAZINE PER 20 MG: Performed by: HOSPITALIST

## 2022-10-30 PROCEDURE — 25010000002 CEFAZOLIN IN DEXTROSE 2-4 GM/100ML-% SOLUTION: Performed by: INTERNAL MEDICINE

## 2022-10-30 RX ORDER — NICOTINE POLACRILEX 4 MG
LOZENGE BUCCAL
Status: COMPLETED
Start: 2022-10-30 | End: 2022-10-30

## 2022-10-30 RX ORDER — DEXTROSE MONOHYDRATE 25 G/50ML
25 INJECTION, SOLUTION INTRAVENOUS
Status: DISCONTINUED | OUTPATIENT
Start: 2022-10-30 | End: 2022-11-02

## 2022-10-30 RX ORDER — NICOTINE POLACRILEX 4 MG
15 LOZENGE BUCCAL
Status: DISCONTINUED | OUTPATIENT
Start: 2022-10-30 | End: 2022-11-02

## 2022-10-30 RX ORDER — NEBIVOLOL 10 MG/1
10 TABLET ORAL
Status: DISCONTINUED | OUTPATIENT
Start: 2022-10-31 | End: 2022-11-03

## 2022-10-30 RX ORDER — HYDRALAZINE HYDROCHLORIDE 20 MG/ML
10 INJECTION INTRAMUSCULAR; INTRAVENOUS EVERY 4 HOURS PRN
Status: DISCONTINUED | OUTPATIENT
Start: 2022-10-30 | End: 2022-11-01

## 2022-10-30 RX ADMIN — CEFAZOLIN SODIUM 2 G: 2 INJECTION, SOLUTION INTRAVENOUS at 04:35

## 2022-10-30 RX ADMIN — DORZOLAMIDE HYDROCHLORIDE 1 DROP: 20 SOLUTION/ DROPS OPHTHALMIC at 09:24

## 2022-10-30 RX ADMIN — DEXTROSE 15 G: 15 GEL ORAL at 12:00

## 2022-10-30 RX ADMIN — BRIMONIDINE TARTRATE 1 DROP: 2 SOLUTION OPHTHALMIC at 09:24

## 2022-10-30 RX ADMIN — HYDRALAZINE HYDROCHLORIDE 10 MG: 20 INJECTION INTRAMUSCULAR; INTRAVENOUS at 16:56

## 2022-10-30 RX ADMIN — ENOXAPARIN SODIUM 40 MG: 100 INJECTION SUBCUTANEOUS at 21:48

## 2022-10-30 RX ADMIN — MUPIROCIN 1 APPLICATION: 20 OINTMENT TOPICAL at 16:57

## 2022-10-30 RX ADMIN — MICONAZOLE NITRATE 1 APPLICATION: 20 CREAM TOPICAL at 09:24

## 2022-10-30 RX ADMIN — Medication 15 G: at 12:00

## 2022-10-30 RX ADMIN — CEFAZOLIN SODIUM 2 G: 2 INJECTION, SOLUTION INTRAVENOUS at 16:34

## 2022-10-30 RX ADMIN — NEBIVOLOL HYDROCHLORIDE 5 MG: 5 TABLET ORAL at 16:45

## 2022-10-31 LAB
ANION GAP SERPL CALCULATED.3IONS-SCNC: 10 MMOL/L (ref 5–15)
BUN SERPL-MCNC: 14 MG/DL (ref 8–23)
BUN/CREAT SERPL: 18.7 (ref 7–25)
CALCIUM SPEC-SCNC: 8.5 MG/DL (ref 8.6–10.5)
CHLORIDE SERPL-SCNC: 105 MMOL/L (ref 98–107)
CO2 SERPL-SCNC: 22 MMOL/L (ref 22–29)
CREAT SERPL-MCNC: 0.75 MG/DL (ref 0.57–1)
EGFRCR SERPLBLD CKD-EPI 2021: 81.6 ML/MIN/1.73
GLUCOSE BLDC GLUCOMTR-MCNC: 129 MG/DL (ref 70–130)
GLUCOSE BLDC GLUCOMTR-MCNC: 194 MG/DL (ref 70–130)
GLUCOSE SERPL-MCNC: 116 MG/DL (ref 65–99)
POTASSIUM SERPL-SCNC: 4.2 MMOL/L (ref 3.5–5.2)
SODIUM SERPL-SCNC: 137 MMOL/L (ref 136–145)

## 2022-10-31 PROCEDURE — 63710000001 PREDNISONE PER 1 MG: Performed by: HOSPITALIST

## 2022-10-31 PROCEDURE — 80048 BASIC METABOLIC PNL TOTAL CA: CPT | Performed by: HOSPITALIST

## 2022-10-31 PROCEDURE — 63710000001 INSULIN LISPRO (HUMAN) PER 5 UNITS: Performed by: HOSPITALIST

## 2022-10-31 PROCEDURE — 82962 GLUCOSE BLOOD TEST: CPT

## 2022-10-31 PROCEDURE — 25010000002 ENOXAPARIN PER 10 MG: Performed by: INTERNAL MEDICINE

## 2022-10-31 PROCEDURE — 25010000002 CEFAZOLIN IN DEXTROSE 2-4 GM/100ML-% SOLUTION: Performed by: INTERNAL MEDICINE

## 2022-10-31 RX ORDER — OLANZAPINE 2.5 MG/1
2.5 TABLET ORAL 2 TIMES DAILY
Status: DISCONTINUED | OUTPATIENT
Start: 2022-10-31 | End: 2022-11-02

## 2022-10-31 RX ADMIN — OLANZAPINE 2.5 MG: 2.5 TABLET ORAL at 21:26

## 2022-10-31 RX ADMIN — ENOXAPARIN SODIUM 40 MG: 100 INJECTION SUBCUTANEOUS at 21:27

## 2022-10-31 RX ADMIN — INSULIN LISPRO 2 UNITS: 100 INJECTION, SOLUTION INTRAVENOUS; SUBCUTANEOUS at 19:16

## 2022-10-31 RX ADMIN — OLANZAPINE 2.5 MG: 2.5 TABLET ORAL at 18:00

## 2022-10-31 RX ADMIN — LOSARTAN POTASSIUM 100 MG: 100 TABLET, FILM COATED ORAL at 10:31

## 2022-10-31 RX ADMIN — DORZOLAMIDE HYDROCHLORIDE 1 DROP: 20 SOLUTION/ DROPS OPHTHALMIC at 21:26

## 2022-10-31 RX ADMIN — PANTOPRAZOLE SODIUM 40 MG: 40 TABLET, DELAYED RELEASE ORAL at 06:19

## 2022-10-31 RX ADMIN — CEFAZOLIN SODIUM 2 G: 2 INJECTION, SOLUTION INTRAVENOUS at 10:32

## 2022-10-31 RX ADMIN — CLONAZEPAM 0.5 MG: 0.5 TABLET ORAL at 23:48

## 2022-10-31 RX ADMIN — CEFAZOLIN SODIUM 2 G: 2 INJECTION, SOLUTION INTRAVENOUS at 19:15

## 2022-10-31 RX ADMIN — MICONAZOLE NITRATE 1 APPLICATION: 20 CREAM TOPICAL at 21:28

## 2022-10-31 RX ADMIN — NEBIVOLOL 10 MG: 10 TABLET ORAL at 10:31

## 2022-10-31 RX ADMIN — ATORVASTATIN CALCIUM 10 MG: 20 TABLET, FILM COATED ORAL at 21:26

## 2022-10-31 RX ADMIN — BRIMONIDINE TARTRATE 1 DROP: 2 SOLUTION OPHTHALMIC at 21:26

## 2022-10-31 RX ADMIN — MUPIROCIN 1 APPLICATION: 20 OINTMENT TOPICAL at 21:28

## 2022-10-31 RX ADMIN — PAROXETINE HYDROCHLORIDE 10 MG: 10 TABLET, FILM COATED ORAL at 10:31

## 2022-10-31 RX ADMIN — PREDNISONE 20 MG: 20 TABLET ORAL at 10:31

## 2022-10-31 RX ADMIN — DORZOLAMIDE HYDROCHLORIDE 1 DROP: 20 SOLUTION/ DROPS OPHTHALMIC at 10:36

## 2022-10-31 RX ADMIN — CEFAZOLIN SODIUM 2 G: 2 INJECTION, SOLUTION INTRAVENOUS at 00:27

## 2022-10-31 RX ADMIN — BRIMONIDINE TARTRATE 1 DROP: 2 SOLUTION OPHTHALMIC at 10:36

## 2022-11-01 LAB
ANION GAP SERPL CALCULATED.3IONS-SCNC: 9 MMOL/L (ref 5–15)
BASOPHILS # BLD AUTO: 0.03 10*3/MM3 (ref 0–0.2)
BASOPHILS NFR BLD AUTO: 0.4 % (ref 0–1.5)
BUN SERPL-MCNC: 13 MG/DL (ref 8–23)
BUN/CREAT SERPL: 18.3 (ref 7–25)
CALCIUM SPEC-SCNC: 8.5 MG/DL (ref 8.6–10.5)
CHLORIDE SERPL-SCNC: 107 MMOL/L (ref 98–107)
CO2 SERPL-SCNC: 22 MMOL/L (ref 22–29)
CREAT SERPL-MCNC: 0.71 MG/DL (ref 0.57–1)
DEPRECATED RDW RBC AUTO: 42.3 FL (ref 37–54)
EGFRCR SERPLBLD CKD-EPI 2021: 87.2 ML/MIN/1.73
EOSINOPHIL # BLD AUTO: 0.13 10*3/MM3 (ref 0–0.4)
EOSINOPHIL NFR BLD AUTO: 1.6 % (ref 0.3–6.2)
ERYTHROCYTE [DISTWIDTH] IN BLOOD BY AUTOMATED COUNT: 13.1 % (ref 12.3–15.4)
GLUCOSE BLDC GLUCOMTR-MCNC: 114 MG/DL (ref 70–130)
GLUCOSE BLDC GLUCOMTR-MCNC: 116 MG/DL (ref 70–130)
GLUCOSE BLDC GLUCOMTR-MCNC: 119 MG/DL (ref 70–130)
GLUCOSE BLDC GLUCOMTR-MCNC: 144 MG/DL (ref 70–130)
GLUCOSE BLDC GLUCOMTR-MCNC: 150 MG/DL (ref 70–130)
GLUCOSE SERPL-MCNC: 117 MG/DL (ref 65–99)
HCT VFR BLD AUTO: 41.3 % (ref 34–46.6)
HGB BLD-MCNC: 13.7 G/DL (ref 12–15.9)
IMM GRANULOCYTES # BLD AUTO: 0.05 10*3/MM3 (ref 0–0.05)
IMM GRANULOCYTES NFR BLD AUTO: 0.6 % (ref 0–0.5)
LYMPHOCYTES # BLD AUTO: 1.49 10*3/MM3 (ref 0.7–3.1)
LYMPHOCYTES NFR BLD AUTO: 18.5 % (ref 19.6–45.3)
MCH RBC QN AUTO: 29.9 PG (ref 26.6–33)
MCHC RBC AUTO-ENTMCNC: 33.2 G/DL (ref 31.5–35.7)
MCV RBC AUTO: 90.2 FL (ref 79–97)
MONOCYTES # BLD AUTO: 0.87 10*3/MM3 (ref 0.1–0.9)
MONOCYTES NFR BLD AUTO: 10.8 % (ref 5–12)
NEUTROPHILS NFR BLD AUTO: 5.49 10*3/MM3 (ref 1.7–7)
NEUTROPHILS NFR BLD AUTO: 68.1 % (ref 42.7–76)
NRBC BLD AUTO-RTO: 0 /100 WBC (ref 0–0.2)
PLATELET # BLD AUTO: 187 10*3/MM3 (ref 140–450)
PMV BLD AUTO: 10.1 FL (ref 6–12)
POTASSIUM SERPL-SCNC: 3.6 MMOL/L (ref 3.5–5.2)
RBC # BLD AUTO: 4.58 10*6/MM3 (ref 3.77–5.28)
SODIUM SERPL-SCNC: 138 MMOL/L (ref 136–145)
WBC NRBC COR # BLD: 8.06 10*3/MM3 (ref 3.4–10.8)

## 2022-11-01 PROCEDURE — 63710000001 PREDNISONE PER 1 MG: Performed by: HOSPITALIST

## 2022-11-01 PROCEDURE — 85025 COMPLETE CBC W/AUTO DIFF WBC: CPT | Performed by: HOSPITALIST

## 2022-11-01 PROCEDURE — 97530 THERAPEUTIC ACTIVITIES: CPT

## 2022-11-01 PROCEDURE — 25010000002 ENOXAPARIN PER 10 MG: Performed by: INTERNAL MEDICINE

## 2022-11-01 PROCEDURE — 82962 GLUCOSE BLOOD TEST: CPT

## 2022-11-01 PROCEDURE — 80048 BASIC METABOLIC PNL TOTAL CA: CPT | Performed by: HOSPITALIST

## 2022-11-01 RX ADMIN — BRIMONIDINE TARTRATE 1 DROP: 2 SOLUTION OPHTHALMIC at 21:13

## 2022-11-01 RX ADMIN — MICONAZOLE NITRATE 1 APPLICATION: 20 CREAM TOPICAL at 21:13

## 2022-11-01 RX ADMIN — PREDNISONE 20 MG: 20 TABLET ORAL at 17:04

## 2022-11-01 RX ADMIN — DORZOLAMIDE HYDROCHLORIDE 1 DROP: 20 SOLUTION/ DROPS OPHTHALMIC at 21:13

## 2022-11-01 RX ADMIN — DORZOLAMIDE HYDROCHLORIDE 1 DROP: 20 SOLUTION/ DROPS OPHTHALMIC at 16:55

## 2022-11-01 RX ADMIN — PAROXETINE HYDROCHLORIDE 10 MG: 10 TABLET, FILM COATED ORAL at 17:02

## 2022-11-01 RX ADMIN — ENOXAPARIN SODIUM 40 MG: 100 INJECTION SUBCUTANEOUS at 21:12

## 2022-11-01 RX ADMIN — OLANZAPINE 2.5 MG: 2.5 TABLET ORAL at 21:12

## 2022-11-01 RX ADMIN — LOSARTAN POTASSIUM 100 MG: 100 TABLET, FILM COATED ORAL at 16:58

## 2022-11-01 RX ADMIN — MUPIROCIN 1 APPLICATION: 20 OINTMENT TOPICAL at 16:43

## 2022-11-01 RX ADMIN — MUPIROCIN 1 APPLICATION: 20 OINTMENT TOPICAL at 21:16

## 2022-11-01 RX ADMIN — ATORVASTATIN CALCIUM 10 MG: 20 TABLET, FILM COATED ORAL at 21:12

## 2022-11-01 RX ADMIN — OLANZAPINE 2.5 MG: 2.5 TABLET ORAL at 17:03

## 2022-11-01 RX ADMIN — NEBIVOLOL 10 MG: 10 TABLET ORAL at 16:57

## 2022-11-01 RX ADMIN — BRIMONIDINE TARTRATE 1 DROP: 2 SOLUTION OPHTHALMIC at 16:55

## 2022-11-01 NOTE — PLAN OF CARE
Goal Outcome Evaluation:  Plan of Care Reviewed With: patient        Progress: improving  Outcome Evaluation: Pt received in bed upon arrival. Pt calm and cooperative and participated in PT this AM. Restraints released for therapy. Pt was assisted from supine to long sitting and then eventually sitting EOB requiring mod A x 2. Pt initially presented with a posterior trunk lean but was able to self-correct to CGA/SBA. PT assisted pt with AAROM/PROM on B UE/LE x 5-10. Pt demonstrates B hamstring tightness and became resisted with ROM more speficially on R extremities. Pt fatigues with activity and assisted back to supine. Restraints reapplied. Pt will continue to benefit from skilled PT to address strength, endurance, and functional mobility.

## 2022-11-01 NOTE — PLAN OF CARE
Goal Outcome Evaluation:              Outcome Evaluation: Monitoring blood sugars. Restraints still in place. Dressing changed to left foot. Cooperative and took medications. Drinking water but refusing food. Fall precautions maintained, bed alarm on.

## 2022-11-01 NOTE — PROGRESS NOTES
"Daily progress note    11/01/22      Primary care physician      Chief complaint  Doing doing same and remain agitated combative but no respiratory distress.  Patient cooperative in the time of interview.    History of present illness  77-year-old white female with history of diabetes hypertension hyperlipidemia also has had CVA and currently nursing home for last 3 years with severe dysarthria brought to the emergency room with multiple lesions noted on her hands and feet in the left foot has blisters and bullae.  Patient has no fever chills or itching.  Patient has been treated with oral steroids and local wound care without any improvement.  Patient evaluated in ER found to have pemphigus vulgaris started on IV steroids admit for management.     REVIEW OF SYSTEMS  Unobtainable   .   PHYSICAL EXAM   Blood pressure 164/72, pulse 64, temperature 97.2 °F (36.2 °C), resp. rate 18, height 165.1 cm (65\"), weight 74.3 kg (163 lb 11.2 oz), SpO2 97 %.    Constitutional: Awake and alert.Communication is  limited due to her underlying aphasia.    HEENT: Normocephalic and atraumatic.   Neck: Normal range of motion. Supple. No JVD present.   Cardiovascular: Normal rate, regular rhythm and normal heart sounds.  Pulmonary/Chest: Effort normal and breath sounds normal.   Abdominal: Soft. Bowel sounds are normal. No distension. There is no tenderness. There is no rebound and no guarding.   Musculoskeletal:  No edema or deformity.   Neurological: Pt. is awake and alert and appears to be at her neurologic baseline p  Skin: She has scattered excoriations over the forearms and lower legs bilaterally.  Over her hands, she is has several areas of bullae/blisters that have resolved.  However, over her left foot she has several areas of tense bullae-especially on the plantar surface with some surrounding erythema.  This area does appear to be very tender.  Psychiatric: Unable to assess.       LAB RESULTS  Lab Results (last 24 " hours)     Procedure Component Value Units Date/Time    POC Glucose Once [[id number]]  (Normal) Collected: 11/01/22 1042    Specimen: Blood Updated: 11/01/22 1043     Glucose 116 mg/dL      Comment: Meter: [id number] : [id number] Amor Lancaster CNA       Basic Metabolic Panel [[id number]]  (Abnormal) Collected: 11/01/22 0631    Specimen: Blood Updated: 11/01/22 0718     Glucose 117 mg/dL      BUN 13 mg/dL      Creatinine 0.71 mg/dL      Sodium 138 mmol/L      Potassium 3.6 mmol/L      Chloride 107 mmol/L      CO2 22.0 mmol/L      Calcium 8.5 mg/dL      BUN/Creatinine Ratio 18.3     Anion Gap 9.0 mmol/L      eGFR 87.2 mL/min/1.73      Comment: National Kidney Foundation and American Society of Nephrology (ASN) Task Force recommended calculation based on the Chronic Kidney Disease Epidemiology Collaboration (CKD-EPI) equation refit without adjustment for race.       Narrative:      GFR Normal >60  Chronic Kidney Disease <60  Kidney Failure <15    The GFR formula is only valid for adults with stable renal function between ages 18 and 70.    CBC & Differential [[id number]]  (Abnormal) Collected: 11/01/22 0631    Specimen: Blood Updated: 11/01/22 0704    Narrative:      The following orders were created for panel order CBC & Differential.  Procedure                               Abnormality         Status                     ---------                               -----------         ------                     CBC Auto Differential[[id number]]        Abnormal            Final result                 Please view results for these tests on the individual orders.    CBC Auto Differential [[id number]]  (Abnormal) Collected: 11/01/22 0631    Specimen: Blood Updated: 11/01/22 0704     WBC 8.06 10*3/mm3      RBC 4.58 10*6/mm3      Hemoglobin 13.7 g/dL      Hematocrit 41.3 %      MCV 90.2 fL      MCH 29.9 pg      MCHC 33.2 g/dL      RDW 13.1 %      RDW-SD 42.3 fl      MPV 10.1 fL      Platelets 187 10*3/mm3      Neutrophil % 68.1  %      Lymphocyte % 18.5 %      Monocyte % 10.8 %      Eosinophil % 1.6 %      Basophil % 0.4 %      Immature Grans % 0.6 %      Neutrophils, Absolute 5.49 10*3/mm3      Lymphocytes, Absolute 1.49 10*3/mm3      Monocytes, Absolute 0.87 10*3/mm3      Eosinophils, Absolute 0.13 10*3/mm3      Basophils, Absolute 0.03 10*3/mm3      Immature Grans, Absolute 0.05 10*3/mm3      nRBC 0.0 /100 WBC     POC Glucose Once [[id number]]  (Normal) Collected: 11/01/22 0640    Specimen: Blood Updated: 11/01/22 0642     Glucose 119 mg/dL      Comment: Meter: [id number] : [id number] Reynoso Rafaela NA       POC Glucose Once [[id number]]  (Normal) Collected: 11/01/22 0142    Specimen: Blood Updated: 11/01/22 0143     Glucose 114 mg/dL      Comment: Meter: [id number] : [id number] Reynoso Rafaela NA       POC Glucose Once [[id number]]  (Abnormal) Collected: 10/31/22 1720    Specimen: Blood Updated: 10/31/22 1722     Glucose 194 mg/dL      Comment: Meter: [id number] : [id number] Oskar Ruiz CNA           Imaging Results (Last 24 Hours)     ** No results found for the last 24 hours. **          Current Facility-Administered Medications:   •  atorvastatin (LIPITOR) tablet 10 mg, 10 mg, Oral, Nightly, Mustapha Mahan MD, 10 mg at 10/31/22 2126  •  brimonidine (ALPHAGAN) 0.2 % ophthalmic solution 1 drop, 1 drop, Both Eyes, BID, Mustapha Mahan MD, 1 drop at 10/31/22 2126  •  clonazePAM (KlonoPIN) tablet 0.5 mg, 0.5 mg, Oral, TID PRN, Cooper Simpson MD, 0.5 mg at 10/31/22 2348  •  dextrose (D50W) (25 g/50 mL) IV injection 25 g, 25 g, Intravenous, Q15 Min PRN, Mustapha Mahan MD  •  dextrose (GLUTOSE) oral gel 15 g, 15 g, Oral, Q15 Min PRN, Mustapha Mahan MD, 15 g at 10/30/22 1200  •  dorzolamide (TRUSOPT) 2 % ophthalmic solution 1 drop, 1 drop, Both Eyes, BID, Mustapha Mahan MD, 1 drop at 10/31/22 2126  •  Enoxaparin Sodium (LOVENOX) syringe 40 mg, 40 mg, Subcutaneous, Q24H, Cooper Simpson MD, 40 mg at 10/31/22 2127  •  glucagon (human  recombinant) (GLUCAGEN DIAGNOSTIC) injection 1 mg, 1 mg, Subcutaneous, Q15 Min PRN, Mustapha Mahan MD  •  hydrALAZINE (APRESOLINE) injection 10 mg, 10 mg, Intravenous, Q4H PRN, Mustapha Mahan MD, 10 mg at 10/30/22 1656  •  insulin lispro (ADMELOG) injection 0-7 Units, 0-7 Units, Subcutaneous, 4x Daily With Meals & Nightly, Mustapha Mahan MD, 2 Units at 10/31/22 1916  •  losartan (COZAAR) tablet 100 mg, 100 mg, Oral, Q24H, Mustapha Mahan MD, 100 mg at 10/31/22 1031  •  miconazole (MICOTIN) 2 % cream 1 application, 1 application, Topical, BID, Sumit Lawson MD, 1 application at 10/31/22 2128  •  mupirocin (BACTROBAN) 2 % ointment 1 application, 1 application, Topical, Q12H, Sumit Lawson MD, 1 application at 10/31/22 2128  •  nebivolol (BYSTOLIC) tablet 10 mg, 10 mg, Oral, Q24H, Mustapha Mahan MD, 10 mg at 10/31/22 1031  •  OLANZapine (zyPREXA) injection 5 mg, 5 mg, Intramuscular, Q8H PRN, Cooper Simpson MD, 5 mg at 10/29/22 1611  •  OLANZapine (zyPREXA) tablet 2.5 mg, 2.5 mg, Oral, BID, Jaylen Heaton III, MD, 2.5 mg at 10/31/22 2126  •  pantoprazole (PROTONIX) EC tablet 40 mg, 40 mg, Oral, Q AM, Mustapha Mahan MD, 40 mg at 10/31/22 0619  •  PARoxetine (PAXIL) tablet 10 mg, 10 mg, Oral, Daily, Mustapha Mahna MD, 10 mg at 10/31/22 1031  •  povidone-iodine (BETADINE) external solution 1 application, 1 application, Topical, Daily, Mustapha Mahan MD, 1 application at 10/22/22 1148  •  predniSONE (DELTASONE) tablet 20 mg, 20 mg, Oral, Daily With Breakfast, Mustapha Mahan MD, 20 mg at 10/31/22 1031  •  [COMPLETED] Insert peripheral IV, , , Once **AND** sodium chloride 0.9 % flush 10 mL, 10 mL, Intravenous, PRN, Viktor Harrington MD, 10 mL at 10/19/22 0854     ASSESSMENT  Pemphigus vulgaris  Insulin-dependent diabetes mellitus with low blood sugar  Hypertension  Hyperlipidemia  History of CVA  Dysarthria  Severe dementia  Gastroesophageal reflux disease  Probably altered mental status due to  steroids  Urinary tract infection per pansensitive E. coli    PLAN  [facility]  Antibiotics completed  Continue prednisone  Adjust insulin dose  Appreciate psychiatric consultation  Continue local mupirocin.  Continue stress ulcer prophylaxis  Continue DVT prophylaxis with SCDs.  Continue to work with PT/OT  DNR but patient has been not ready for comfort care at this time.  Needs geropsych bed on discharge.    Mustapha Mahan MD  11/01/22

## 2022-11-01 NOTE — THERAPY TREATMENT NOTE
Patient Name: Darby Workman  : 1945    MRN: [id number]                              Today's Date: 2022       Admit Date: 10/18/2022    Visit Dx:     ICD-10-CM ICD-9-CM   1. Pemphigus vulgaris  L10.0 694.4   2. Dementia without behavioral disturbance ([facility])  F03.90 294.20   3. Cerebrovascular accident (CVA), unspecified mechanism ([facility])  I63.9 434.91   4. Type 2 diabetes mellitus with hyperglycemia, unspecified whether long term insulin use ([facility])  E11.65 250.00     Patient Active Problem List   Diagnosis   • Hypertensive crisis   • Diabetes mellitus (HCC)   • Hyperlipidemia   • Hypertension   • Elevated troponin   • Acute cerebrovascular accident (CVA) of cerebellum (HCC)   • Right-sided headache   • Acute ischemic stroke (HCC)   • Embolic stroke (HCC)   • Anticoagulated by anticoagulation treatment   • Stroke (cerebrum) (HCC)   • Dysthymic disorder   • Hypotension   • Upper GI bleed   • Uremic encephalopathy   • Acute kidney injury (HCC)   • Acute pancreatitis   • Hemorrhagic shock (HCC)   • Thrombocytopenia (HCC)   • Type 2 diabetes mellitus with hyperglycemia (HCC)   • Dementia without behavioral disturbance (HCC)   • Acute posthemorrhagic anemia   • Pemphigus vulgaris     Past Medical History:   Diagnosis Date   • Acute cerebrovascular accident (CVA) of cerebellum (HCC)    • Acute ischemic stroke (HCC)    • Arthritis    • Coronary artery disease    • CVA (cerebral vascular accident) (HCC)    • Diabetes mellitus (HCC)    • Heart attack (HCC)    • History of blood clots    • Hyperlipidemia    • Hypertension    • Vision loss      Past Surgical History:   Procedure Laterality Date   • CAROTID ENDARTERECTOMY Left 2019    Procedure: Left carotid endarterectomy;  Surgeon: Rupert Oliva MD;  Location: [facility] OR;  Service: Neurosurgery   • EMBOLECTOMY Left 2019    Procedure: CEREBRAL ANGIOGRAM, LEFT INTERNAL CAROTID ARTERY STENT;  Surgeon: Rupert Oliva MD;  Location: [facility]  OR 18/19;  Service: Neurosurgery   • ENDOSCOPY N/A 3/28/2020    Procedure: ESOPHAGOGASTRODUODENOSCOPY AT BEDSIDE WITH EPI INJECTION AND GOLD PROBE CAUTERIZATION;  Surgeon: Malathi Bright MD;  Location: [facility] ENDOSCOPY;  Service: Gastroenterology;  Laterality: N/A;  PRE GI BLEED  POST EROSIVE GASTRITIS, DUODENAL ULCER WITH CLOT      General Information     Row Name 11/01/22 1200          OT Time and Intention    Document Type therapy note (daily note)  -     Mode of Treatment co-treatment;occupational therapy;physical therapy  -     Row Name 11/01/22 1200          General Information    Patient Profile Reviewed yes  -     Existing Precautions/Restrictions fall  -     Row Name 11/01/22 1200          Cognition    Orientation Status (Cognition) unable/difficult to assess  expressive aphasia  -     Row Name 11/01/22 1200          Safety Issues, Functional Mobility    Impairments Affecting Function (Mobility) pain;strength;endurance/activity tolerance;cognition;balance;postural/trunk control;range of motion (ROM)  -           User Key  (r) = Recorded By, (t) = Taken By, (c) = Cosigned By    Initials Name Provider Type    MW Yudi Banegas OT Occupational Therapist                 Mobility/ADL's     Row Name 11/01/22 1200          Bed Mobility    Bed Mobility supine-sit;sit-supine  -     Supine-Sit [place] (Bed Mobility) moderate assist (50% patient effort);2 person assist;verbal cues;nonverbal cues (demo/gesture)  -     Sit-Supine [place] (Bed Mobility) moderate assist (50% patient effort);2 person assist;verbal cues;nonverbal cues (demo/gesture)  -     Assistive Device (Bed Mobility) head of bed elevated  -     Comment, (Bed Mobility) long sitting mod Ax2, then came to EOB with mod Ax2, mostly CGA/SBA for sitting balance, poor command following  -     Row Name 11/01/22 1200          Transfers    Comment, (Transfers) deferred due to cognitive status  -           User Key  (r) =  Recorded By, (t) = Taken By, (c) = Cosigned By    Initials Name Provider Type     Yudi Banegas OT Occupational Therapist               Obj/Interventions     Row Name 11/01/22 1202          Shoulder (Therapeutic Exercise)    Shoulder (Therapeutic Exercise) AAROM (active assistive range of motion)  -     Shoulder AAROM (Therapeutic Exercise) bilateral;horizontal aBduction/aDduction;flexion;extension;10 repetitions;sitting  -     Row Name 11/01/22 1202          Motor Skills    Therapeutic Exercise shoulder  -     Row Name 11/01/22 1202          Balance    Balance Assessment sitting static balance;sitting dynamic balance  -     Static Sitting Balance standby assist  -     Dynamic Sitting Balance contact guard;minimal assist  -     Position, Sitting Balance supported;sitting edge of bed  -     Comment, Balance CGA/min A throughout BUE AAROM, pt resistive throughout ther ex  -           User Key  (r) = Recorded By, (t) = Taken By, (c) = Cosigned By    Initials Name Provider Type    Yudi Fajardo OT Occupational Therapist               Goals/Plan    No documentation.                Clinical Impression     Row Name 11/01/22 1207          Pain Assessment    Pre/Posttreatment Pain Comment aphasic, no facial grimacing noted  -     Row Name 11/01/22 1207          Plan of Care Review    Plan of Care Reviewed With patient  -     Progress improving  -     Outcome Evaluation Pt seen for OT/PT tx session this date, cotx to maximize therapeutic benefit. Pt in restraints upon entry, released for session. Sup <> long sitting with mod Ax2, then to EOB with mod Ax2. Able to tolerate EOB sitting ~10 mins with CGA for UE AAROM, max cues for carryover, poor command following noted this date. Resistive throughout UE with task. Pt will continue to benefit from skilled OT to address noted functional deficits, Restraints donned and bed alarm on at exit.  -     Row Name 11/01/22 1207          Therapy Plan  Review/Discharge Plan (OT)    Anticipated Discharge Disposition (OT) extended care facility  -     Row Name 11/01/22 1207          Vital Signs    O2 Delivery Pre Treatment room air  -MW     Pre Patient Position Supine  -MW     Intra Patient Position Sitting  -MW     Post Patient Position Supine  -MW     Row Name 11/01/22 1207          Positioning and Restraints    Pre-Treatment Position in bed  -MW     Post Treatment Position bed  -MW     In Bed notified nsg;fowlers;call light within reach;encouraged to call for assist;exit alarm on;side rails up x2  -MW     Restraints released:;reapplied:;notified nsg:;soft limb;vest  -MW           User Key  (r) = Recorded By, (t) = Taken By, (c) = Cosigned By    Initials Name Provider Type    Yudi Fajardo, SAL Occupational Therapist               Outcome Measures     Row Name 11/01/22 1212          How much help from another is currently needed...    Putting on and taking off regular lower body clothing? 1  -MW     Bathing (including washing, rinsing, and drying) 1  -MW     Toileting (which includes using toilet bed pan or urinal) 1  -MW     Putting on and taking off regular upper body clothing 2  -MW     Taking care of personal grooming (such as brushing teeth) 2  -MW     Eating meals 3  -MW     AM-PAC 6 Clicks Score (OT) 10  -MW     Row Name 11/01/22 1147          How much help from another person do you currently need...    Turning from your back to your side while in flat bed without using bedrails? 2  -CS     Moving from lying on back to sitting on the side of a flat bed without bedrails? 2  -CS     Moving to and from a bed to a chair (including a wheelchair)? 2  -CS     Standing up from a chair using your arms (e.g., wheelchair, bedside chair)? 1  -CS     Climbing 3-5 steps with a railing? 1  -CS     To walk in hospital room? 1  -CS     AM-PAC 6 Clicks Score (PT) 9  -CS     Highest level of mobility 3 --> Sat at edge of bed  -CS     Row Name 11/01/22 1212 11/01/22  1147       Functional Assessment    Outcome Measure Options AM-PAC 6 Clicks Daily Activity (OT)  - AM-PAC 6 Clicks Basic Mobility (PT)  -          User Key  (r) = Recorded By, (t) = Taken By, (c) = Cosigned By    Initials Name Provider Type    Yudi Fajardo OT Occupational Therapist    Pennie Roberson PT Physical Therapist                Occupational Therapy Education     Title: PT OT SLP Therapies (In Progress)     Topic: Occupational Therapy (In Progress)     Point: ADL training (In Progress)     Description:   Instruct learner(s) on proper safety adaptation and remediation techniques during self care or transfers.   Instruct in proper use of assistive devices.              Learning Progress Summary           Patient Acceptance, E,TB, NL by MT at 10/30/2022 0457    Acceptance, E, NL by  at 10/20/2022 1437    Comment: role of OT                   Point: Precautions (In Progress)     Description:   Instruct learner(s) on prescribed precautions during self-care and functional transfers.              Learning Progress Summary           Patient Acceptance, E,TB, NL by MT at 10/30/2022 0457    Acceptance, E, NL by  at 10/20/2022 1437    Comment: role of OT                   Point: Body mechanics (In Progress)     Description:   Instruct learner(s) on proper positioning and spine alignment during self-care, functional mobility activities and/or exercises.              Learning Progress Summary           Patient Acceptance, E,TB, NL by MT at 10/30/2022 0457    Acceptance, E, NL by  at 10/20/2022 1437    Comment: role of OT                               User Key     Initials Effective Dates Name Provider Type Discipline    MT 06/16/21 -  Jody Camp RN Registered Nurse Nurse     08/20/21 -  Yudi Banegas OT Occupational Therapist OT              OT Recommendation and Plan  Planned Therapy Interventions (OT): activity tolerance training, functional balance retraining, occupation/activity  based interventions, neuromuscular control/coordination retraining, patient/caregiver education/training, BADL retraining, ROM/therapeutic exercise, transfer/mobility retraining, strengthening exercise  Therapy Frequency (OT): 3 times/wk  Plan of Care Review  Plan of Care Reviewed With: patient  Progress: improving  Outcome Evaluation: Pt seen for OT/PT tx session this date, cotx to maximize therapeutic benefit. Pt in restraints upon entry, released for session. Sup <> long sitting with mod Ax2, then to EOB with mod Ax2. Able to tolerate EOB sitting ~10 mins with CGA for UE AAROM, max cues for carryover, poor command following noted this date. Resistive throughout UE with task. Pt will continue to benefit from skilled OT to address noted functional deficits, Restraints donned and bed alarm on at exit.     Time Calculation:    Time Calculation- OT     Row Name 11/01/22 1213             Time Calculation- OT    OT Start Time 0920  -MW      OT Stop Time 0933  -MW      OT Time Calculation (min) 13 min  -MW      Total Timed Code Minutes- OT 13 minute(s)  -MW      OT Received On 11/01/22  -MW      OT - Next Appointment 11/03/22  -MW         Timed Charges    99147 - OT Therapeutic Activity Minutes 13  -MW         Total Minutes    Timed Charges Total Minutes 13  -MW       Total Minutes 13  -MW            User Key  (r) = Recorded By, (t) = Taken By, (c) = Cosigned By    Initials Name Provider Type    Yudi Fajardo OT Occupational Therapist              Therapy Charges for Today     Code Description Service Date Service Provider Modifiers Qty    [id number]  OT THERAPEUTIC ACT EA 15 MIN 11/1/2022 Yudi Banegas OT GO 1               Yudi Banegas OT  11/1/2022

## 2022-11-01 NOTE — THERAPY TREATMENT NOTE
Patient Name: Darby Workman  : 1945    MRN: [id number]                              Today's Date: 2022       Admit Date: 10/18/2022    Visit Dx:     ICD-10-CM ICD-9-CM   1. Pemphigus vulgaris  L10.0 694.4   2. Dementia without behavioral disturbance (Formerly [facility])  F03.90 294.20   3. Cerebrovascular accident (CVA), unspecified mechanism (Former[facility])  I63.9 434.91   4. Type 2 diabetes mellitus with hyperglycemia, unspecified whether long term insulin use (Formerly [facility])  E11.65 250.00     Patient Active Problem List   Diagnosis   • Hypertensive crisis   • Diabetes mellitus (HCC)   • Hyperlipidemia   • Hypertension   • Elevated troponin   • Acute cerebrovascular accident (CVA) of cerebellum (HCC)   • Right-sided headache   • Acute ischemic stroke (HCC)   • Embolic stroke (HCC)   • Anticoagulated by anticoagulation treatment   • Stroke (cerebrum) (HCC)   • Dysthymic disorder   • Hypotension   • Upper GI bleed   • Uremic encephalopathy   • Acute kidney injury (HCC)   • Acute pancreatitis   • Hemorrhagic shock (HCC)   • Thrombocytopenia (HCC)   • Type 2 diabetes mellitus with hyperglycemia (HCC)   • Dementia without behavioral disturbance (HCC)   • Acute posthemorrhagic anemia   • Pemphigus vulgaris     Past Medical History:   Diagnosis Date   • Acute cerebrovascular accident (CVA) of cerebellum (HCC)    • Acute ischemic stroke (HCC)    • Arthritis    • Coronary artery disease    • CVA (cerebral vascular accident) (HCC)    • Diabetes mellitus (HCC)    • Heart attack (HCC)    • History of blood clots    • Hyperlipidemia    • Hypertension    • Vision loss      Past Surgical History:   Procedure Laterality Date   • CAROTID ENDARTERECTOMY Left 2019    Procedure: Left carotid endarterectomy;  Surgeon: Rupert Oliva MD;  Location: [facility] OR;  Service: Neurosurgery   • EMBOLECTOMY Left 2019    Procedure: CEREBRAL ANGIOGRAM, LEFT INTERNAL CAROTID ARTERY STENT;  Surgeon: Rupert Oliva MD;  Location: [facility]  OR 18/19;  Service: Neurosurgery   • ENDOSCOPY N/A 3/28/2020    Procedure: ESOPHAGOGASTRODUODENOSCOPY AT BEDSIDE WITH EPI INJECTION AND GOLD PROBE CAUTERIZATION;  Surgeon: Malathi Bright MD;  Location: [facility] ENDOSCOPY;  Service: Gastroenterology;  Laterality: N/A;  PRE GI BLEED  POST EROSIVE GASTRITIS, DUODENAL ULCER WITH CLOT      General Information     Row Name 11/01/22 1137          Physical Therapy Time and Intention    Document Type therapy note (daily note)  -CS     Mode of Treatment physical therapy  -CS     Row Name 11/01/22 1137          General Information    Existing Precautions/Restrictions fall  -CS     Barriers to Rehab medically complex;previous functional deficit;cognitive status  -CS     Row Name 11/01/22 1137          Cognition    Orientation Status (Cognition) unable/difficult to assess  expressive aphasia  -CS     Row Name 11/01/22 1137          Safety Issues, Functional Mobility    Safety Issues Affecting Function (Mobility) ability to follow commands;insight into deficits/self-awareness;judgment;problem-solving;safety precaution awareness;safety precautions follow-through/compliance  -CS     Impairments Affecting Function (Mobility) pain;strength;endurance/activity tolerance;cognition;balance;postural/trunk control;range of motion (ROM)  -CS           User Key  (r) = Recorded By, (t) = Taken By, (c) = Cosigned By    Initials Name Provider Type    CS Pennie Jenkins PT Physical Therapist               Mobility     Row Name 11/01/22 1138          Bed Mobility    Bed Mobility supine-sit;sit-supine  -CS     Supine-Sit Benson (Bed Mobility) moderate assist (50% patient effort);2 person assist;verbal cues;nonverbal cues (demo/gesture)  -CS     Sit-Supine Benson (Bed Mobility) moderate assist (50% patient effort);2 person assist;verbal cues;nonverbal cues (demo/gesture)  -CS     Comment, (Bed Mobility) pt assisted to long sitting from supine requiring mod A x 2 then to sitting EOB;  pt initially presented with a posterior trunk lean but able to self-correct to CGA/SBA - pt with expressive aphasia and unable to follow commands this visit  -CS     Row Name 11/01/22 1138          Transfers    Comment, (Transfers) deferred secondary to L LE blisters and cognitive status  -CS           User Key  (r) = Recorded By, (t) = Taken By, (c) = Cosigned By    Initials Name Provider Type    CS Pennie Jenkins, PT Physical Therapist               Obj/Interventions     Row Name 11/01/22 1141          Motor Skills    Therapeutic Exercise other (see comments)  B UE/LE PROM x 5-10 reps - pt resisted ROM more specifically on R side  -CS     Row Name 11/01/22 1141          Balance    Balance Assessment sitting static balance;sitting dynamic balance  -CS     Static Sitting Balance standby assist  -CS     Dynamic Sitting Balance contact guard;minimal assist  -CS     Position, Sitting Balance supported;sitting edge of bed  -CS     Comment, Balance initially presented with a posterior trunk lean sitting EOB but able to self-correct to CGA/SBA; required CGA/min A with B UE/LE movements  -CS           User Key  (r) = Recorded By, (t) = Taken By, (c) = Cosigned By    Initials Name Provider Type    CS Pennie Jenkins, PT Physical Therapist               Goals/Plan    No documentation.                Clinical Impression     Row Name 11/01/22 1142          Pain    Additional Documentation Pain Scale: FACES Pre/Post-Treatment (Group)  -     Row Name 11/01/22 1142          Pain Scale: FACES Pre/Post-Treatment    Pain: FACES Scale, Pretreatment 0-->no hurt  -CS     Posttreatment Pain Rating 0-->no hurt  -CS     Row Name 11/01/22 1142          Plan of Care Review    Plan of Care Reviewed With patient  -CS     Progress improving  -CS     Outcome Evaluation Pt received in bed upon arrival. Pt calm and cooperative and participated in PT this AM. Restraints released for therapy. Pt was assisted from supine to long sitting and then  eventually sitting EOB requiring mod A x 2. Pt initially presented with a posterior trunk lean but was able to self-correct to CGA/SBA. PT assisted pt with AAROM/PROM on B UE/LE x 5-10. Pt demonstrates B hamstring tightness and became resisted with ROM more speficially on R extremities. Pt fatigues with activity and assisted back to supine. Restraints reapplied. Pt will continue to benefit from skilled PT to address strength, endurance, and functional mobility.  -CS     Row Name 11/01/22 1142          Therapy Assessment/Plan (PT)    Criteria for Skilled Interventions Met (PT) yes;meets criteria  -CS     Therapy Frequency (PT) 2 times/wk  -CS     Row Name 11/01/22 1142          Positioning and Restraints    Pre-Treatment Position in bed  -CS     Post Treatment Position bed  -CS     In Bed notified nsg;supine;call light within reach;encouraged to call for assist;exit alarm on  -CS     Restraints reapplied:;soft limb;vest  -CS           User Key  (r) = Recorded By, (t) = Taken By, (c) = Cosigned By    Initials Name Provider Type    CS Pennie Jenkins, PT Physical Therapist               Outcome Measures     Row Name 11/01/22 1147          How much help from another person do you currently need...    Turning from your back to your side while in flat bed without using bedrails? 2  -CS     Moving from lying on back to sitting on the side of a flat bed without bedrails? 2  -CS     Moving to and from a bed to a chair (including a wheelchair)? 2  -CS     Standing up from a chair using your arms (e.g., wheelchair, bedside chair)? 1  -CS     Climbing 3-5 steps with a railing? 1  -CS     To walk in hospital room? 1  -CS     AM-PAC 6 Clicks Score (PT) 9  -CS     Highest level of mobility 3 --> Sat at edge of bed  -CS     Row Name 11/01/22 1147          Functional Assessment    Outcome Measure Options AM-PAC 6 Clicks Basic Mobility (PT)  -CS           User Key  (r) = Recorded By, (t) = Taken By, (c) = Cosigned By    Initials Name  Provider Type     Pennie Jenkins, PT Physical Therapist                             Physical Therapy Education     Title: PT OT SLP Therapies (In Progress)     Topic: Physical Therapy (In Progress)     Point: Mobility training (In Progress)     Learning Progress Summary           Patient Acceptance, E,TB, NL,NR by CS at 11/1/2022 1147    Acceptance, E,TB, NL by MT at 10/30/2022 0457    Acceptance, E, NL by EM at 10/28/2022 1648    Acceptance, E, NR by EM at 10/26/2022 1537    Acceptance, E,D, NR by PC at 10/24/2022 1539    Acceptance, E, NR by EM at 10/21/2022 1507    Nonacceptance, E, NR by LW at 10/19/2022 1557                               User Key     Initials Effective Dates Name Provider Type Discipline    PC 06/16/21 -  Theodora Silverio, PT Physical Therapist PT    EM 06/16/21 -  Vivian Ha, PT Physical Therapist PT    MT 06/16/21 -  Jody Camp, RN Registered Nurse Nurse    LW 06/10/22 -  Luara Germain, PT Physical Therapist PT     09/22/22 -  Pennie Jenkins, PT Physical Therapist PT              PT Recommendation and Plan     Plan of Care Reviewed With: patient  Progress: improving  Outcome Evaluation: Pt received in bed upon arrival. Pt calm and cooperative and participated in PT this AM. Restraints released for therapy. Pt was assisted from supine to long sitting and then eventually sitting EOB requiring mod A x 2. Pt initially presented with a posterior trunk lean but was able to self-correct to CGA/SBA. PT assisted pt with AAROM/PROM on B UE/LE x 5-10. Pt demonstrates B hamstring tightness and became resisted with ROM more speficially on R extremities. Pt fatigues with activity and assisted back to supine. Restraints reapplied. Pt will continue to benefit from skilled PT to address strength, endurance, and functional mobility.     Time Calculation:    PT Charges     Row Name 11/01/22 1148             Time Calculation    Start Time 0920  -      Stop Time 0933  -      Time  Calculation (min) 13 min  -CS      PT Received On 11/01/22  -CS      PT - Next Appointment 11/04/22  -CS         Time Calculation- PT    Total Timed Code Minutes- PT 12 minute(s)  -CS         Timed Charges    83120 - PT Therapeutic Activity Minutes 12  -CS         Total Minutes    Timed Charges Total Minutes 12  -CS       Total Minutes 12  -CS            User Key  (r) = Recorded By, (t) = Taken By, (c) = Cosigned By    Initials Name Provider Type    CS Pennie Jenkins, PT Physical Therapist              Therapy Charges for Today     Code Description Service Date Service Provider Modifiers Qty    [id number]  PT THERAPEUTIC ACT EA 15 MIN 11/1/2022 Pennie Jenkins, PT GP 1          PT G-Codes  Outcome Measure Options: AM-PAC 6 Clicks Basic Mobility (PT)  AM-PAC 6 Clicks Score (PT): 9  AM-PAC 6 Clicks Score (OT): 11  Modified Kehinde Scale: 5 - Severe disability.  Bedridden, incontinent, and requiring constant nursing care and attention.    Pennie Jenkins PT  11/1/2022

## 2022-11-01 NOTE — PLAN OF CARE
Goal Outcome Evaluation:  Plan of Care Reviewed With: patient        Progress: improving  Outcome Evaluation: Pt seen for OT/PT tx session this date, cotx to maximize therapeutic benefit. Pt in restraints upon entry, released for session. Sup <> long sitting with mod Ax2, then to EOB with mod Ax2. Able to tolerate EOB sitting ~10 mins with CGA for UE AAROM, max cues for carryover, poor command following noted this date. Resistive throughout UE with task. Pt will continue to benefit from skilled OT to address noted functional deficits, Restraints donned and bed alarm on at exit.

## 2022-11-01 NOTE — PROGRESS NOTES
"  Infectious Diseases Progress Note    Faizan Wilder MD     [facility]  [place]: 12 days  Patient Identification:  Name: Darby Workman  Age: 77 y.o.  Sex: female  :  1945  MRN: [id number]         Primary Care Physician: Eric Matta MD            Subjective: Does not engage and spontaneously talking and moving.  Interval History: See consultation note.    Objective:    Scheduled Meds:atorvastatin, 10 mg, Oral, Nightly  brimonidine, 1 drop, Both Eyes, BID  dorzolamide, 1 drop, Both Eyes, BID  enoxaparin, 40 mg, Subcutaneous, Q24H  insulin lispro, 0-7 Units, Subcutaneous, 4x Daily With Meals & Nightly  losartan, 100 mg, Oral, Q24H  miconazole, 1 application, Topical, BID  mupirocin, 1 application, Topical, Q12H  nebivolol, 10 mg, Oral, Q24H  OLANZapine, 2.5 mg, Oral, BID  pantoprazole, 40 mg, Oral, Q AM  PARoxetine, 10 mg, Oral, Daily  povidone-iodine, 1 application, Topical, Daily  predniSONE, 20 mg, Oral, Daily With Breakfast      Continuous Infusions:     Vital signs in last 24 hours:  Temp:  [96.8 °F (36 °C)-97.1 °F (36.2 °C)] 96.8 °F (36 °C)  Heart Rate:  [52-77] 52  Resp:  [16-18] 16  BP: (117-137)/(61-69) 137/69    Intake/Output:    Intake/Output Summary (Last 24 hours) at 2022 0945  Last data filed at 2022 0000  Gross per 24 hour   Intake 300 ml   Output 480 ml   Net -180 ml       Exam:  /69 (BP Location: Right arm, Patient Position: Lying)   Pulse 52   Temp 96.8 °F (36 °C) (Axillary)   Resp 16   Ht 165.1 cm (65\")   Wt 74.3 kg (163 lb 11.2 oz)   SpO2 98%   BMI 27.24 kg/m²   Patient is examined using the personal protective equipment as per guidelines from infection control for this particular patient as enacted.  Hand washing was performed before and after patient interaction.  General Appearance: Appears comfortable in no acute distress                          Head:    Normocephalic, without obvious abnormality, atraumatic                           Eyes:    PERERICK, " conjunctivae/corneas clear, EOM's intact, both eyes                         Throat:   Lips, tongue, gums normal; oral mucosa pink and moist                           Neck:   Supple, symmetrical, trachea midline, no JVD                         Lungs:    Clear to auscultation bilaterally, respirations unlabored                 Chest Wall:    No tenderness or deformity                          Heart:  S1-S2 regular                  Abdomen:   Soft nontender                 Extremities:   Extremities normal, atraumatic, no cyanosis or edema, hand lesions are much better right foot is dressed.                      Neurologic: Significant aphasia due to prior stroke       Data Review:    I reviewed the patient's new clinical results.  Results from last 7 days   Lab Units 11/01/22  0631 10/30/22  0938 10/28/22  0638 10/27/22  0619 10/26/22  0625   WBC 10*3/mm3 8.06 7.39 8.03 9.04 14.17*   HEMOGLOBIN g/dL 13.7 13.8 13.9 14.5 14.0   PLATELETS 10*3/mm3 187 185 174 189 185     Results from last 7 days   Lab Units 11/01/22 0631 10/31/22  0525 10/30/22  0938 10/28/22  0638 10/27/22  0619 10/26/22  0958   SODIUM mmol/L 138 137 139 134* 136 138   POTASSIUM mmol/L 3.6 4.2 3.3* 4.0 4.1 3.7   CHLORIDE mmol/L 107 105 108* 104 106 104   CO2 mmol/L 22.0 22.0 22.7 23.0 21.3* 24.4   BUN mg/dL 13 14 13 13 18 19   CREATININE mg/dL 0.71 0.75 0.70 0.64 0.74 0.65   CALCIUM mg/dL 8.5* 8.5* 8.7 8.1* 8.2* 8.4*   GLUCOSE mg/dL 117* 116* 54* 211* 243* 78       Microbiology Results (last 10 days)     Procedure Component Value - Date/Time    Urine Culture - Urine, Urine, Random Void [[id number]]  (Abnormal)  (Susceptibility) Collected: 10/26/22 0023    Lab Status: Final result Specimen: Urine, Random Void Updated: 10/27/22 1031     Urine Culture >100,000 CFU/mL Escherichia coli    Narrative:      Colonization of the urinary tract without infection is common. Treatment is discouraged unless the patient is symptomatic, pregnant, or undergoing an  invasive urologic procedure.    Susceptibility      Escherichia coli      KRISTINA      Ampicillin Susceptible      Ampicillin + Sulbactam Susceptible      Cefazolin Susceptible      Cefepime Susceptible      Ceftazidime Susceptible      Ceftriaxone Susceptible      Gentamicin Susceptible      Levofloxacin Susceptible      Nitrofurantoin Susceptible      Piperacillin + Tazobactam Susceptible      Trimethoprim + Sulfamethoxazole Susceptible                               Microbiology Results (last 10 days)     Procedure Component Value - Date/Time    Urine Culture - Urine, Urine, Random Void [[id number]]  (Abnormal)  (Susceptibility) Collected: 10/26/22 0023    Lab Status: Final result Specimen: Urine, Random Void Updated: 10/27/22 1031     Urine Culture >100,000 CFU/mL Escherichia coli    Narrative:      Colonization of the urinary tract without infection is common. Treatment is discouraged unless the patient is symptomatic, pregnant, or undergoing an invasive urologic procedure.    Susceptibility      Escherichia coli      KRISTINA      Ampicillin Susceptible      Ampicillin + Sulbactam Susceptible      Cefazolin Susceptible      Cefepime Susceptible      Ceftazidime Susceptible      Ceftriaxone Susceptible      Gentamicin Susceptible      Levofloxacin Susceptible      Nitrofurantoin Susceptible      Piperacillin + Tazobactam Susceptible      Trimethoprim + Sulfamethoxazole Susceptible                                   Assessment:    Pemphigus vulgaris  MRSA colonization  Leukocytosis secondary to steroid use  Metabolic encephalopathy secondary to UTI-urine culture positive for E. coli.  Recommendations/discussion  · Agitation and restlessness could be due to urinary tract infection.  · Antibiotics were simplified to cefazolin based on the sensitivity of E. Coli.  · To finish her cefazolin after last dose on 10/31/2022  · Observe off of antibiotic therapy  · Continue with local care.  Faizan Wilder MD  11/1/2022  09:45  EDT    Much of this encounter note is an electronic transcription/translation of spoken language to printed text. The electronic translation of spoken language may permit erroneous, or at times, nonsensical words or phrases to be inadvertently transcribed; Although I have reviewed the note for such errors, some may still exist

## 2022-11-01 NOTE — PROGRESS NOTES
The patient is resting comfortably today with Posey restraint in place.  Staff reports that she has been sleeping a great deal, so I am hesitant to add any further potentially sedating medications at this point.

## 2022-11-02 LAB
ANION GAP SERPL CALCULATED.3IONS-SCNC: 13 MMOL/L (ref 5–15)
BASOPHILS # BLD AUTO: 0.01 10*3/MM3 (ref 0–0.2)
BASOPHILS NFR BLD AUTO: 0.2 % (ref 0–1.5)
BUN SERPL-MCNC: 16 MG/DL (ref 8–23)
BUN/CREAT SERPL: 22.5 (ref 7–25)
CALCIUM SPEC-SCNC: 8.7 MG/DL (ref 8.6–10.5)
CHLORIDE SERPL-SCNC: 104 MMOL/L (ref 98–107)
CO2 SERPL-SCNC: 21 MMOL/L (ref 22–29)
CREAT SERPL-MCNC: 0.71 MG/DL (ref 0.57–1)
DEPRECATED RDW RBC AUTO: 42.7 FL (ref 37–54)
EGFRCR SERPLBLD CKD-EPI 2021: 87.2 ML/MIN/1.73
EOSINOPHIL # BLD AUTO: 0.01 10*3/MM3 (ref 0–0.4)
EOSINOPHIL NFR BLD AUTO: 0.2 % (ref 0.3–6.2)
ERYTHROCYTE [DISTWIDTH] IN BLOOD BY AUTOMATED COUNT: 13.1 % (ref 12.3–15.4)
GLUCOSE BLDC GLUCOMTR-MCNC: 121 MG/DL (ref 70–130)
GLUCOSE BLDC GLUCOMTR-MCNC: 139 MG/DL (ref 70–130)
GLUCOSE BLDC GLUCOMTR-MCNC: 211 MG/DL (ref 70–130)
GLUCOSE BLDC GLUCOMTR-MCNC: 223 MG/DL (ref 70–130)
GLUCOSE SERPL-MCNC: 154 MG/DL (ref 65–99)
HCT VFR BLD AUTO: 42.9 % (ref 34–46.6)
HGB BLD-MCNC: 14.1 G/DL (ref 12–15.9)
IMM GRANULOCYTES # BLD AUTO: 0.03 10*3/MM3 (ref 0–0.05)
IMM GRANULOCYTES NFR BLD AUTO: 0.5 % (ref 0–0.5)
LYMPHOCYTES # BLD AUTO: 0.64 10*3/MM3 (ref 0.7–3.1)
LYMPHOCYTES NFR BLD AUTO: 10.2 % (ref 19.6–45.3)
MCH RBC QN AUTO: 29.6 PG (ref 26.6–33)
MCHC RBC AUTO-ENTMCNC: 32.9 G/DL (ref 31.5–35.7)
MCV RBC AUTO: 89.9 FL (ref 79–97)
MONOCYTES # BLD AUTO: 0.17 10*3/MM3 (ref 0.1–0.9)
MONOCYTES NFR BLD AUTO: 2.7 % (ref 5–12)
NEUTROPHILS NFR BLD AUTO: 5.44 10*3/MM3 (ref 1.7–7)
NEUTROPHILS NFR BLD AUTO: 86.2 % (ref 42.7–76)
NRBC BLD AUTO-RTO: 0 /100 WBC (ref 0–0.2)
PLATELET # BLD AUTO: 206 10*3/MM3 (ref 140–450)
PMV BLD AUTO: 10.3 FL (ref 6–12)
POTASSIUM SERPL-SCNC: 4 MMOL/L (ref 3.5–5.2)
RBC # BLD AUTO: 4.77 10*6/MM3 (ref 3.77–5.28)
SODIUM SERPL-SCNC: 138 MMOL/L (ref 136–145)
WBC NRBC COR # BLD: 6.3 10*3/MM3 (ref 3.4–10.8)

## 2022-11-02 PROCEDURE — 63710000001 PREDNISONE PER 1 MG: Performed by: HOSPITALIST

## 2022-11-02 PROCEDURE — 63710000001 INSULIN LISPRO (HUMAN) PER 5 UNITS: Performed by: HOSPITALIST

## 2022-11-02 PROCEDURE — 25010000002 ENOXAPARIN PER 10 MG: Performed by: INTERNAL MEDICINE

## 2022-11-02 PROCEDURE — 82962 GLUCOSE BLOOD TEST: CPT

## 2022-11-02 PROCEDURE — 80048 BASIC METABOLIC PNL TOTAL CA: CPT | Performed by: HOSPITALIST

## 2022-11-02 PROCEDURE — 85025 COMPLETE CBC W/AUTO DIFF WBC: CPT | Performed by: HOSPITALIST

## 2022-11-02 RX ORDER — PREDNISONE 10 MG/1
10 TABLET ORAL
Status: DISCONTINUED | OUTPATIENT
Start: 2022-11-03 | End: 2022-11-06

## 2022-11-02 RX ORDER — DIVALPROEX SODIUM 125 MG/1
250 CAPSULE, COATED PELLETS ORAL 2 TIMES DAILY
Status: DISCONTINUED | OUTPATIENT
Start: 2022-11-02 | End: 2022-11-03

## 2022-11-02 RX ORDER — OLANZAPINE 5 MG/1
5 TABLET ORAL 2 TIMES DAILY
Status: DISCONTINUED | OUTPATIENT
Start: 2022-11-02 | End: 2022-11-04

## 2022-11-02 RX ADMIN — ENOXAPARIN SODIUM 40 MG: 100 INJECTION SUBCUTANEOUS at 20:57

## 2022-11-02 RX ADMIN — PAROXETINE HYDROCHLORIDE 10 MG: 10 TABLET, FILM COATED ORAL at 11:37

## 2022-11-02 RX ADMIN — LOSARTAN POTASSIUM 100 MG: 100 TABLET, FILM COATED ORAL at 11:37

## 2022-11-02 RX ADMIN — PREDNISONE 20 MG: 20 TABLET ORAL at 11:37

## 2022-11-02 RX ADMIN — OLANZAPINE 2.5 MG: 2.5 TABLET ORAL at 11:37

## 2022-11-02 RX ADMIN — ATORVASTATIN CALCIUM 10 MG: 20 TABLET, FILM COATED ORAL at 20:57

## 2022-11-02 RX ADMIN — MUPIROCIN 1 APPLICATION: 20 OINTMENT TOPICAL at 11:43

## 2022-11-02 RX ADMIN — PANTOPRAZOLE SODIUM 40 MG: 40 TABLET, DELAYED RELEASE ORAL at 06:29

## 2022-11-02 RX ADMIN — NEBIVOLOL 10 MG: 10 TABLET ORAL at 11:37

## 2022-11-02 RX ADMIN — OLANZAPINE 5 MG: 5 TABLET ORAL at 20:57

## 2022-11-02 RX ADMIN — DIVALPROEX SODIUM 250 MG: 125 CAPSULE, COATED PELLETS ORAL at 15:13

## 2022-11-02 RX ADMIN — CLONAZEPAM 0.5 MG: 0.5 TABLET ORAL at 23:40

## 2022-11-02 RX ADMIN — MICONAZOLE NITRATE 1 APPLICATION: 20 CREAM TOPICAL at 20:58

## 2022-11-02 RX ADMIN — MICONAZOLE NITRATE 1 APPLICATION: 20 CREAM TOPICAL at 12:50

## 2022-11-02 RX ADMIN — INSULIN LISPRO 3 UNITS: 100 INJECTION, SOLUTION INTRAVENOUS; SUBCUTANEOUS at 18:15

## 2022-11-02 RX ADMIN — MUPIROCIN 1 APPLICATION: 20 OINTMENT TOPICAL at 20:58

## 2022-11-02 RX ADMIN — INSULIN LISPRO 3 UNITS: 100 INJECTION, SOLUTION INTRAVENOUS; SUBCUTANEOUS at 21:37

## 2022-11-02 RX ADMIN — DORZOLAMIDE HYDROCHLORIDE 1 DROP: 20 SOLUTION/ DROPS OPHTHALMIC at 11:39

## 2022-11-02 RX ADMIN — BRIMONIDINE TARTRATE 1 DROP: 2 SOLUTION OPHTHALMIC at 11:39

## 2022-11-02 NOTE — PROGRESS NOTES
The patient continues to require a Posey restraint and continues to have episodes of agitation.  I have increased her Zyprexa to 5 mg twice daily and will add scheduled Depakote sprinkles 250 twice daily.

## 2022-11-02 NOTE — PROGRESS NOTES
"Daily progress note    11/02/22      Primary care physician      Chief complaint  Doing same and remain agitated combative but no respiratory distress.      History of present illness  77-year-old white female with history of diabetes hypertension hyperlipidemia also has had CVA and currently nursing home for last 3 years with severe dysarthria brought to the emergency room with multiple lesions noted on her hands and feet in the left foot has blisters and bullae.  Patient has no fever chills or itching.  Patient has been treated with oral steroids and local wound care without any improvement.  Patient evaluated in ER found to have pemphigus vulgaris started on IV steroids admit for management.     REVIEW OF SYSTEMS  Unobtainable   .   PHYSICAL EXAM   Blood pressure 159/95, pulse 72, temperature 97.1 °F (36.2 °C), temperature source Oral, resp. rate 18, height 165.1 cm (65\"), weight 74.3 kg (163 lb 11.2 oz), SpO2 98 %.    Constitutional: Awake and alert.Communication is  limited due to her underlying aphasia.    HEENT: Normocephalic and atraumatic.   Neck: Normal range of motion. Supple. No JVD present.   Cardiovascular: Normal rate, regular rhythm and normal heart sounds.  Pulmonary/Chest: Effort normal and breath sounds normal.   Abdominal: Soft. Bowel sounds are normal. No distension. There is no tenderness. There is no rebound and no guarding.   Musculoskeletal:  No edema or deformity.   Neurological: Pt. is awake and alert and appears to be at her neurologic baseline p  Skin: She has scattered excoriations over the forearms and lower legs bilaterally.  Over her hands, she is has several areas of bullae/blisters that have resolved.  However, over her left foot she has several areas of tense bullae-especially on the plantar surface with some surrounding erythema.  This area does appear to be very tender.  Psychiatric: Unable to assess.       LAB RESULTS  Lab Results (last 24 hours)     Procedure Component " Value Units Date/Time    POC Glucose Once [[id number]]  (Abnormal) Collected: 11/02/22 1058    Specimen: Blood Updated: 11/02/22 1059     Glucose 139 mg/dL      Comment: Meter: [id number] : [id number] Amor Lancaster CNA       Basic Metabolic Panel [[id number]]  (Abnormal) Collected: 11/02/22 0552    Specimen: Blood Updated: 11/02/22 0645     Glucose 154 mg/dL      BUN 16 mg/dL      Creatinine 0.71 mg/dL      Sodium 138 mmol/L      Potassium 4.0 mmol/L      Chloride 104 mmol/L      CO2 21.0 mmol/L      Calcium 8.7 mg/dL      BUN/Creatinine Ratio 22.5     Anion Gap 13.0 mmol/L      eGFR 87.2 mL/min/1.73      Comment: National Kidney Foundation and American Society of Nephrology (ASN) Task Force recommended calculation based on the Chronic Kidney Disease Epidemiology Collaboration (CKD-EPI) equation refit without adjustment for race.       Narrative:      GFR Normal >60  Chronic Kidney Disease <60  Kidney Failure <15    The GFR formula is only valid for adults with stable renal function between ages 18 and 70.    CBC & Differential [[id number]]  (Abnormal) Collected: 11/02/22 0552    Specimen: Blood Updated: 11/02/22 0631    Narrative:      The following orders were created for panel order CBC & Differential.  Procedure                               Abnormality         Status                     ---------                               -----------         ------                     CBC Auto Differential[[id number]]        Abnormal            Final result                 Please view results for these tests on the individual orders.    CBC Auto Differential [[id number]]  (Abnormal) Collected: 11/02/22 0552    Specimen: Blood Updated: 11/02/22 0631     WBC 6.30 10*3/mm3      RBC 4.77 10*6/mm3      Hemoglobin 14.1 g/dL      Hematocrit 42.9 %      MCV 89.9 fL      MCH 29.6 pg      MCHC 32.9 g/dL      RDW 13.1 %      RDW-SD 42.7 fl      MPV 10.3 fL      Platelets 206 10*3/mm3      Neutrophil % 86.2 %      Lymphocyte % 10.2 %       Monocyte % 2.7 %      Eosinophil % 0.2 %      Basophil % 0.2 %      Immature Grans % 0.5 %      Neutrophils, Absolute 5.44 10*3/mm3      Lymphocytes, Absolute 0.64 10*3/mm3      Monocytes, Absolute 0.17 10*3/mm3      Eosinophils, Absolute 0.01 10*3/mm3      Basophils, Absolute 0.01 10*3/mm3      Immature Grans, Absolute 0.03 10*3/mm3      nRBC 0.0 /100 WBC     POC Glucose Once [[id number]]  (Normal) Collected: 11/02/22 0625    Specimen: Blood Updated: 11/02/22 0626     Glucose 121 mg/dL      Comment: Meter: [id number] : [id number] Reynoso Rafaela NA       POC Glucose Once [[id number]]  (Abnormal) Collected: 11/01/22 2108    Specimen: Blood Updated: 11/01/22 2119     Glucose 150 mg/dL      Comment: Meter: [id number] : [id number] Reynoso Rafaela NA       POC Glucose Once [[id number]]  (Abnormal) Collected: 11/01/22 1818    Specimen: Blood Updated: 11/01/22 1834     Glucose 144 mg/dL      Comment: Meter: [id number] : [id number] Amor Lancaster CNA           Imaging Results (Last 24 Hours)     ** No results found for the last 24 hours. **          Current Facility-Administered Medications:   •  atorvastatin (LIPITOR) tablet 10 mg, 10 mg, Oral, Nightly, Mustapha Mahan MD, 10 mg at 11/01/22 2112  •  brimonidine (ALPHAGAN) 0.2 % ophthalmic solution 1 drop, 1 drop, Both Eyes, BID, Mustapha Mahan MD, 1 drop at 11/02/22 1139  •  clonazePAM (KlonoPIN) tablet 0.5 mg, 0.5 mg, Oral, TID PRN, Cooper Simpson MD, 0.5 mg at 10/31/22 6348  •  dextrose (D50W) (25 g/50 mL) IV injection 25 g, 25 g, Intravenous, Q15 Min PRN, Mustapha Mahan MD  •  dextrose (GLUTOSE) oral gel 15 g, 15 g, Oral, Q15 Min PRN, Mustapha Mahan MD, 15 g at 10/30/22 1200  •  dorzolamide (TRUSOPT) 2 % ophthalmic solution 1 drop, 1 drop, Both Eyes, BID, Mustapha Mahan MD, 1 drop at 11/02/22 1139  •  Enoxaparin Sodium (LOVENOX) syringe 40 mg, 40 mg, Subcutaneous, Q24H, Cooper Simpson MD, 40 mg at 11/01/22 2112  •  glucagon (human recombinant) (GLUCAGEN  DIAGNOSTIC) injection 1 mg, 1 mg, Subcutaneous, Q15 Min PRN, Mustapha Mahan MD  •  insulin lispro (ADMELOG) injection 0-7 Units, 0-7 Units, Subcutaneous, 4x Daily With Meals & Nightly, Mustapha Mahan MD, 2 Units at 10/31/22 1916  •  losartan (COZAAR) tablet 100 mg, 100 mg, Oral, Q24H, Mustapha Mahan MD, 100 mg at 11/02/22 1137  •  miconazole (MICOTIN) 2 % cream 1 application, 1 application, Topical, BID, Sumit Lawson MD, 1 application at 11/01/22 2113  •  mupirocin (BACTROBAN) 2 % ointment 1 application, 1 application, Topical, Q12H, Sumit Lawson MD, 1 application at 11/02/22 1143  •  nebivolol (BYSTOLIC) tablet 10 mg, 10 mg, Oral, Q24H, Mustapha Mahan MD, 10 mg at 11/02/22 1137  •  OLANZapine (zyPREXA) injection 5 mg, 5 mg, Intramuscular, Q8H PRN, Cooper Simpson MD, 5 mg at 10/29/22 1611  •  OLANZapine (zyPREXA) tablet 2.5 mg, 2.5 mg, Oral, BID, Jaylen Heaton III, MD, 2.5 mg at 11/02/22 1137  •  pantoprazole (PROTONIX) EC tablet 40 mg, 40 mg, Oral, Q AM, Mustapha Mahan MD, 40 mg at 11/02/22 0629  •  PARoxetine (PAXIL) tablet 10 mg, 10 mg, Oral, Daily, Mustapha Mahan MD, 10 mg at 11/02/22 1137  •  povidone-iodine (BETADINE) external solution 1 application, 1 application, Topical, Daily, Mustapha Mahan MD, 1 application at 10/22/22 1148  •  predniSONE (DELTASONE) tablet 20 mg, 20 mg, Oral, Daily With Breakfast, Mustapha Mahan MD, 20 mg at 11/02/22 1137  •  [COMPLETED] Insert peripheral IV, , , Once **AND** sodium chloride 0.9 % flush 10 mL, 10 mL, Intravenous, PRN, Viktor Harrington MD, 10 mL at 10/19/22 0854     ASSESSMENT  Pemphigus vulgaris  Insulin-dependent diabetes mellitus with low blood sugar  Hypertension  Hyperlipidemia  History of CVA  Dysarthria  Severe dementia  Gastroesophageal reflux disease  Probably altered mental status due to steroids  Urinary tract infection per pansensitive E. coli    PLAN  CPM  Antibiotics completed  Continue prednisone  Adjust insulin  dose  Appreciate psychiatric consultation  Continue local mupirocin.  Continue stress ulcer prophylaxis  Continue DVT prophylaxis with SCDs.  Continue to work with PT/OT  DNR but patient has been not ready for comfort care at this time.  Discharge planning    Mustapha Mahan MD  11/02/22

## 2022-11-02 NOTE — PLAN OF CARE
Goal Outcome Evaluation:  Plan of Care Reviewed With: patient        Progress: improving  Outcome Evaluation: vss.  pt. cooperative with bedside care.  Had loose bm last night.  Redness to perineal and buttocks . Antifungal ointment applied.  Topical antibiotics applied to scabs on her fingers.  Left foot dressing CDI.  Falls and Isolation precaution maintained.

## 2022-11-03 LAB
GLUCOSE BLDC GLUCOMTR-MCNC: 140 MG/DL (ref 70–130)
GLUCOSE BLDC GLUCOMTR-MCNC: 144 MG/DL (ref 70–130)
GLUCOSE BLDC GLUCOMTR-MCNC: 204 MG/DL (ref 70–130)
GLUCOSE BLDC GLUCOMTR-MCNC: 210 MG/DL (ref 70–130)

## 2022-11-03 PROCEDURE — 63710000001 PREDNISONE PER 5 MG: Performed by: HOSPITALIST

## 2022-11-03 PROCEDURE — 25010000002 ENOXAPARIN PER 10 MG: Performed by: INTERNAL MEDICINE

## 2022-11-03 PROCEDURE — 82962 GLUCOSE BLOOD TEST: CPT

## 2022-11-03 PROCEDURE — 63710000001 INSULIN LISPRO (HUMAN) PER 5 UNITS: Performed by: HOSPITALIST

## 2022-11-03 RX ORDER — DIVALPROEX SODIUM 125 MG/1
250 CAPSULE, COATED PELLETS ORAL 3 TIMES DAILY
Status: DISCONTINUED | OUTPATIENT
Start: 2022-11-03 | End: 2022-11-06 | Stop reason: HOSPADM

## 2022-11-03 RX ORDER — NEBIVOLOL 5 MG/1
5 TABLET ORAL
Status: DISCONTINUED | OUTPATIENT
Start: 2022-11-04 | End: 2022-11-04

## 2022-11-03 RX ADMIN — ENOXAPARIN SODIUM 40 MG: 100 INJECTION SUBCUTANEOUS at 20:43

## 2022-11-03 RX ADMIN — PREDNISONE 10 MG: 10 TABLET ORAL at 10:51

## 2022-11-03 RX ADMIN — MUPIROCIN 1 APPLICATION: 20 OINTMENT TOPICAL at 10:46

## 2022-11-03 RX ADMIN — BRIMONIDINE TARTRATE 1 DROP: 2 SOLUTION OPHTHALMIC at 20:43

## 2022-11-03 RX ADMIN — PAROXETINE HYDROCHLORIDE 10 MG: 10 TABLET, FILM COATED ORAL at 10:34

## 2022-11-03 RX ADMIN — ATORVASTATIN CALCIUM 10 MG: 20 TABLET, FILM COATED ORAL at 20:42

## 2022-11-03 RX ADMIN — OLANZAPINE 5 MG: 5 TABLET ORAL at 20:42

## 2022-11-03 RX ADMIN — INSULIN LISPRO 3 UNITS: 100 INJECTION, SOLUTION INTRAVENOUS; SUBCUTANEOUS at 21:32

## 2022-11-03 RX ADMIN — DORZOLAMIDE HYDROCHLORIDE 1 DROP: 20 SOLUTION/ DROPS OPHTHALMIC at 20:43

## 2022-11-03 RX ADMIN — NEBIVOLOL 10 MG: 10 TABLET ORAL at 10:48

## 2022-11-03 RX ADMIN — BRIMONIDINE TARTRATE 1 DROP: 2 SOLUTION OPHTHALMIC at 10:45

## 2022-11-03 RX ADMIN — MUPIROCIN 1 APPLICATION: 20 OINTMENT TOPICAL at 20:43

## 2022-11-03 RX ADMIN — DORZOLAMIDE HYDROCHLORIDE 1 DROP: 20 SOLUTION/ DROPS OPHTHALMIC at 10:45

## 2022-11-03 RX ADMIN — LOSARTAN POTASSIUM 100 MG: 100 TABLET, FILM COATED ORAL at 10:33

## 2022-11-03 RX ADMIN — MICONAZOLE NITRATE 1 APPLICATION: 20 CREAM TOPICAL at 21:04

## 2022-11-03 RX ADMIN — DIVALPROEX SODIUM 250 MG: 125 CAPSULE, COATED PELLETS ORAL at 10:34

## 2022-11-03 RX ADMIN — DIVALPROEX SODIUM 250 MG: 125 CAPSULE, COATED PELLETS ORAL at 16:53

## 2022-11-03 RX ADMIN — DIVALPROEX SODIUM 250 MG: 125 CAPSULE, COATED PELLETS ORAL at 20:42

## 2022-11-03 RX ADMIN — MICONAZOLE NITRATE 1 APPLICATION: 20 CREAM TOPICAL at 10:50

## 2022-11-03 RX ADMIN — OLANZAPINE 5 MG: 5 TABLET ORAL at 10:34

## 2022-11-03 NOTE — PLAN OF CARE
Goal Outcome Evaluation:  Plan of Care Reviewed With: patient           Outcome Evaluation: pt restless at times, fighting , & scratching, Restraints cont. ,Depakote started, anitfungal cream to buttocks & davey area. Dressing change to lt foot.

## 2022-11-03 NOTE — PROGRESS NOTES
"  Infectious Diseases Progress Note    Faizan Wilder MD     [facility]  [place]: 14 days  Patient Identification:  Name: Darby Workman  Age: 77 y.o.  Sex: female  :  1945  MRN: [id number]         Primary Care Physician: Eric Matta MD            Subjective: Comfortable and sitting on her water.  Does not engage because of the aphasia.  Interval History: See consultation note.    Objective:    Scheduled Meds:atorvastatin, 10 mg, Oral, Nightly  brimonidine, 1 drop, Both Eyes, BID  Divalproex Sodium, 250 mg, Oral, BID  dorzolamide, 1 drop, Both Eyes, BID  enoxaparin, 40 mg, Subcutaneous, Q24H  insulin lispro, 0-7 Units, Subcutaneous, 4x Daily With Meals & Nightly  losartan, 100 mg, Oral, Q24H  miconazole, 1 application, Topical, BID  mupirocin, 1 application, Topical, Q12H  nebivolol, 10 mg, Oral, Q24H  OLANZapine, 5 mg, Oral, BID  pantoprazole, 40 mg, Oral, Q AM  PARoxetine, 10 mg, Oral, Daily  povidone-iodine, 1 application, Topical, Daily  predniSONE, 10 mg, Oral, Daily With Breakfast      Continuous Infusions:     Vital signs in last 24 hours:  Temp:  [96.8 °F (36 °C)-97.9 °F (36.6 °C)] 96.8 °F (36 °C)  Heart Rate:  [51-72] 51  Resp:  [14-18] 14  BP: (135-179)/(57-95) 157/73    Intake/Output:    Intake/Output Summary (Last 24 hours) at 11/3/2022 1003  Last data filed at 2022 1300  Gross per 24 hour   Intake 720 ml   Output --   Net 720 ml       Exam:  /73 (BP Location: Left arm, Patient Position: Lying)   Pulse 51   Temp 96.8 °F (36 °C) (Oral)   Resp 14   Ht 165.1 cm (65\")   Wt 74.3 kg (163 lb 11.2 oz)   SpO2 99%   BMI 27.24 kg/m²   Patient is examined using the personal protective equipment as per guidelines from infection control for this particular patient as enacted.  Hand washing was performed before and after patient interaction.  General Appearance: Comfortable but at times agitated.                          Head:    Normocephalic, without obvious abnormality, " atraumatic                           Eyes:    PERRL, conjunctivae/corneas clear, EOM's intact, both eyes                         Throat:   Lips, tongue, gums normal; oral mucosa pink and moist                           Neck:   Supple, symmetrical, trachea midline, no JVD                         Lungs:    Clear to auscultation bilaterally, respirations unlabored                 Chest Wall:    No tenderness or deformity                          Heart:  S1-S2 regular                  Abdomen:   Soft nontender                 Extremities:   Extremities normal, atraumatic, no cyanosis or edema, hand lesions are much better right foot is dressed.                      Neurologic: Significant aphasia due to prior stroke       Data Review:    I reviewed the patient's new clinical results.  Results from last 7 days   Lab Units 11/02/22  0552 11/01/22  0631 10/30/22  0938 10/28/22  0638   WBC 10*3/mm3 6.30 8.06 7.39 8.03   HEMOGLOBIN g/dL 14.1 13.7 13.8 13.9   PLATELETS 10*3/mm3 206 187 185 174     Results from last 7 days   Lab Units 11/02/22 0552 11/01/22  0631 10/31/22  0525 10/30/22  0938 10/28/22  0638   SODIUM mmol/L 138 138 137 139 134*   POTASSIUM mmol/L 4.0 3.6 4.2 3.3* 4.0   CHLORIDE mmol/L 104 107 105 108* 104   CO2 mmol/L 21.0* 22.0 22.0 22.7 23.0   BUN mg/dL 16 13 14 13 13   CREATININE mg/dL 0.71 0.71 0.75 0.70 0.64   CALCIUM mg/dL 8.7 8.5* 8.5* 8.7 8.1*   GLUCOSE mg/dL 154* 117* 116* 54* 211*       Microbiology Results (last 10 days)     Procedure Component Value - Date/Time    Urine Culture - Urine, Urine, Random Void [[id number]]  (Abnormal)  (Susceptibility) Collected: 10/26/22 0023    Lab Status: Final result Specimen: Urine, Random Void Updated: 10/27/22 1031     Urine Culture >100,000 CFU/mL Escherichia coli    Narrative:      Colonization of the urinary tract without infection is common. Treatment is discouraged unless the patient is symptomatic, pregnant, or undergoing an invasive urologic procedure.     Susceptibility      Escherichia coli      KRISTINA      Ampicillin Susceptible      Ampicillin + Sulbactam Susceptible      Cefazolin Susceptible      Cefepime Susceptible      Ceftazidime Susceptible      Ceftriaxone Susceptible      Gentamicin Susceptible      Levofloxacin Susceptible      Nitrofurantoin Susceptible      Piperacillin + Tazobactam Susceptible      Trimethoprim + Sulfamethoxazole Susceptible                               Microbiology Results (last 10 days)     Procedure Component Value - Date/Time    Urine Culture - Urine, Urine, Random Void [[id number]]  (Abnormal)  (Susceptibility) Collected: 10/26/22 0023    Lab Status: Final result Specimen: Urine, Random Void Updated: 10/27/22 1031     Urine Culture >100,000 CFU/mL Escherichia coli    Narrative:      Colonization of the urinary tract without infection is common. Treatment is discouraged unless the patient is symptomatic, pregnant, or undergoing an invasive urologic procedure.    Susceptibility      Escherichia coli      KRISTINA      Ampicillin Susceptible      Ampicillin + Sulbactam Susceptible      Cefazolin Susceptible      Cefepime Susceptible      Ceftazidime Susceptible      Ceftriaxone Susceptible      Gentamicin Susceptible      Levofloxacin Susceptible      Nitrofurantoin Susceptible      Piperacillin + Tazobactam Susceptible      Trimethoprim + Sulfamethoxazole Susceptible                                   Assessment:    Pemphigus vulgaris  MRSA colonization  Leukocytosis secondary to steroid use  Metabolic encephalopathy secondary to UTI-urine culture positive for E. coli.  Recommendations/discussion  · Agitation and restlessness could be due to urinary tract infection.  · Antibiotics were simplified to cefazolin based on the sensitivity of E. Coli.  · Completed antibiotic treatment on 10/31/2022.  · Observe off of antibiotic therapy  · Continue with local care.  Faizan Wilder MD  11/3/2022  10:03 EDT    Much of this encounter note is an  electronic transcription/translation of spoken language to printed text. The electronic translation of spoken language may permit erroneous, or at times, nonsensical words or phrases to be inadvertently transcribed; Although I have reviewed the note for such errors, some may still exist

## 2022-11-03 NOTE — PLAN OF CARE
Goal Outcome Evaluation:  Plan of Care Reviewed With: patient        Progress: no change  Outcome Evaluation: pt has been restless. took 3 staff to change her brief.  Aggressive behavior when doing incontinence care.  Restraint in place.  .  took her nighttime meds except for depakote.  Prn klonopin given.  antifungal cream to buttocks and perineal area.  Scabs to her fingers.

## 2022-11-03 NOTE — PROGRESS NOTES
The patient is sleeping soundly today but continues to be restless and particularly resistant to hands-on care.  I have increased her Depakote to 250 mg 3 times daily and further upward titration of Zyprexa may also need to be considered.  She remains in a Posey restraint

## 2022-11-03 NOTE — PROGRESS NOTES
"Daily progress note    11/03/22      Primary care physician      Chief complaint  Doing same and remain agitated combative but no respiratory distress.      History of present illness  77-year-old white female with history of diabetes hypertension hyperlipidemia also has had CVA and currently nursing home for last 3 years with severe dysarthria brought to the emergency room with multiple lesions noted on her hands and feet in the left foot has blisters and bullae.  Patient has no fever chills or itching.  Patient has been treated with oral steroids and local wound care without any improvement.  Patient evaluated in ER found to have pemphigus vulgaris started on IV steroids admit for management.     REVIEW OF SYSTEMS  Unobtainable   .   PHYSICAL EXAM   Blood pressure 157/73, pulse 51, temperature 96.8 °F (36 °C), temperature source Oral, resp. rate 14, height 165.1 cm (65\"), weight 76.2 kg (167 lb 15.9 oz), SpO2 99 %.    Constitutional: Awake and alert.Communication is  limited due to her underlying aphasia.    HEENT: Normocephalic and atraumatic.   Neck: Normal range of motion. Supple. No JVD present.   Cardiovascular: Normal rate, regular rhythm and normal heart sounds.  Pulmonary/Chest: Effort normal and breath sounds normal.   Abdominal: Soft. Bowel sounds are normal. No distension. There is no tenderness. There is no rebound and no guarding.   Musculoskeletal:  No edema or deformity.   Neurological: Pt. is awake and alert and appears to be at her neurologic baseline p  Skin: She has scattered excoriations over the forearms and lower legs bilaterally.  Over her hands, she is has several areas of bullae/blisters that have resolved.  However, over her left foot she has several areas of tense bullae-especially on the plantar surface with some surrounding erythema.  This area does appear to be very tender.  Psychiatric: Unable to assess.       LAB RESULTS  Lab Results (last 24 hours)     Procedure Component " Value Units Date/Time    POC Glucose Once [[id number]]  (Abnormal) Collected: 11/03/22 1117    Specimen: Blood Updated: 11/03/22 1129     Glucose 140 mg/dL      Comment: Meter: [id number] : [id number] Rosmery Leung NA       POC Glucose Once [[id number]]  (Abnormal) Collected: 11/03/22 0620    Specimen: Blood Updated: 11/03/22 0621     Glucose 144 mg/dL      Comment: Meter: [id number] : [id number] Heide Pritchard RN       POC Glucose Once [[id number]]  (Abnormal) Collected: 11/02/22 2103    Specimen: Blood Updated: 11/02/22 2105     Glucose 211 mg/dL      Comment: Meter: [id number] : [id number] Lance Servin RN       POC Glucose Once [[id number]]  (Abnormal) Collected: 11/02/22 1605    Specimen: Blood Updated: 11/02/22 1606     Glucose 223 mg/dL      Comment: Meter: [id number] : [id number] Mag OswaldJanie NUNU           Imaging Results (Last 24 Hours)     ** No results found for the last 24 hours. **          Current Facility-Administered Medications:   •  atorvastatin (LIPITOR) tablet 10 mg, 10 mg, Oral, Nightly, Mustapha Mahan MD, 10 mg at 11/02/22 2057  •  brimonidine (ALPHAGAN) 0.2 % ophthalmic solution 1 drop, 1 drop, Both Eyes, BID, Mustapha Mahan MD, 1 drop at 11/03/22 1045  •  clonazePAM (KlonoPIN) tablet 0.5 mg, 0.5 mg, Oral, TID PRN, Cooper Simpson MD, 0.5 mg at 11/02/22 2340  •  Divalproex Sodium (DEPAKOTE SPRINKLE) capsule 250 mg, 250 mg, Oral, TID, Jaylen Heaton III, MD  •  dorzolamide (TRUSOPT) 2 % ophthalmic solution 1 drop, 1 drop, Both Eyes, BID, Mustapha Mahan MD, 1 drop at 11/03/22 1045  •  Enoxaparin Sodium (LOVENOX) syringe 40 mg, 40 mg, Subcutaneous, Q24H, Cooper Simpson MD, 40 mg at 11/02/22 2057  •  insulin lispro (ADMELOG) injection 0-7 Units, 0-7 Units, Subcutaneous, 4x Daily With Meals & Nightly, Mustapha Mahan MD, 3 Units at 11/02/22 2137  •  losartan (COZAAR) tablet 100 mg, 100 mg, Oral, Q24H, Mustapha Mahan MD, 100 mg at 11/03/22 1033  •  miconazole (MICOTIN) 2 %  cream 1 application, 1 application, Topical, BID, Sumit Lawson MD, 1 application at 11/03/22 1050  •  mupirocin (BACTROBAN) 2 % ointment 1 application, 1 application, Topical, Q12H, Sumit Lawson MD, 1 application at 11/03/22 1046  •  nebivolol (BYSTOLIC) tablet 10 mg, 10 mg, Oral, Q24H, Mustapha Mahan MD, 10 mg at 11/03/22 1048  •  OLANZapine (zyPREXA) injection 5 mg, 5 mg, Intramuscular, Q8H PRN, Cooper Simpson MD, 5 mg at 10/29/22 1611  •  OLANZapine (zyPREXA) tablet 5 mg, 5 mg, Oral, BID, Jaylen Heaton III, MD, 5 mg at 11/03/22 1034  •  pantoprazole (PROTONIX) EC tablet 40 mg, 40 mg, Oral, Q AM, Mustapha Mahan MD, 40 mg at 11/02/22 0629  •  PARoxetine (PAXIL) tablet 10 mg, 10 mg, Oral, Daily, Mustapha Mahan MD, 10 mg at 11/03/22 1034  •  povidone-iodine (BETADINE) external solution 1 application, 1 application, Topical, Daily, Mustapha Mahan MD, 1 application at 10/22/22 1148  •  predniSONE (DELTASONE) tablet 10 mg, 10 mg, Oral, Daily With Breakfast, Mustapha Mahan MD, 10 mg at 11/03/22 1051  •  [COMPLETED] Insert peripheral IV, , , Once **AND** sodium chloride 0.9 % flush 10 mL, 10 mL, Intravenous, PRN, Viktor Harrington MD, 10 mL at 10/19/22 0854     ASSESSMENT  Pemphigus vulgaris  E. coli UTI  Insulin-dependent diabetes mellitus   Hypertension  Hyperlipidemia  History of CVA  Dysarthria  Severe dementia  Gastroesophageal reflux disease    PLAN  CPM  Antibiotics completed  Continue prednisone  Adjust insulin dose  Appreciate psychiatric consultation  Continue local mupirocin.  Continue stress ulcer prophylaxis  Continue DVT prophylaxis with SCDs.  Continue to work with PT/OT  DNR but patient has been not ready for comfort care at this time.  Discharge to [facility] once off restraints and bed available    Mustapha Mahan MD  11/03/22

## 2022-11-03 NOTE — PROGRESS NOTES
"  Infectious Diseases Progress Note    Faizan Wilder MD     [facility]  Los: 13 days  Patient Identification:  Name: Darby Workman  Age: 77 y.o.  Sex: female  :  1945  MRN: [id number]         Primary Care Physician: Eric Matta MD            Subjective: Does not engage and spontaneously talking and moving.  Interval History: See consultation note.    Objective:    Scheduled Meds:atorvastatin, 10 mg, Oral, Nightly  brimonidine, 1 drop, Both Eyes, BID  Divalproex Sodium, 250 mg, Oral, BID  dorzolamide, 1 drop, Both Eyes, BID  enoxaparin, 40 mg, Subcutaneous, Q24H  insulin lispro, 0-7 Units, Subcutaneous, 4x Daily With Meals & Nightly  losartan, 100 mg, Oral, Q24H  miconazole, 1 application, Topical, BID  mupirocin, 1 application, Topical, Q12H  nebivolol, 10 mg, Oral, Q24H  OLANZapine, 5 mg, Oral, BID  pantoprazole, 40 mg, Oral, Q AM  PARoxetine, 10 mg, Oral, Daily  povidone-iodine, 1 application, Topical, Daily  [START ON 11/3/2022] predniSONE, 10 mg, Oral, Daily With Breakfast      Continuous Infusions:     Vital signs in last 24 hours:  Temp:  [97 °F (36.1 °C)-98.1 °F (36.7 °C)] 97.9 °F (36.6 °C)  Heart Rate:  [53-72] 70  Resp:  [16-18] 18  BP: (135-171)/(57-95) 166/83    Intake/Output:    Intake/Output Summary (Last 24 hours) at 2022  Last data filed at 2022 1300  Gross per 24 hour   Intake 960 ml   Output --   Net 960 ml       Exam:  /83 (BP Location: Left arm, Patient Position: Lying)   Pulse 70   Temp 97.9 °F (36.6 °C) (Oral)   Resp 18   Ht 165.1 cm (65\")   Wt 74.3 kg (163 lb 11.2 oz)   SpO2 98%   BMI 27.24 kg/m²   Patient is examined using the personal protective equipment as per guidelines from infection control for this particular patient as enacted.  Hand washing was performed before and after patient interaction.  General Appearance: Comfortable but at times agitated.                          Head:    Normocephalic, without obvious abnormality, " atraumatic                           Eyes:    PERRL, conjunctivae/corneas clear, EOM's intact, both eyes                         Throat:   Lips, tongue, gums normal; oral mucosa pink and moist                           Neck:   Supple, symmetrical, trachea midline, no JVD                         Lungs:    Clear to auscultation bilaterally, respirations unlabored                 Chest Wall:    No tenderness or deformity                          Heart:  S1-S2 regular                  Abdomen:   Soft nontender                 Extremities:   Extremities normal, atraumatic, no cyanosis or edema, hand lesions are much better right foot is dressed.                      Neurologic: Significant aphasia due to prior stroke       Data Review:    I reviewed the patient's new clinical results.  Results from last 7 days   Lab Units 11/02/22  0552 11/01/22  0631 10/30/22  0938 10/28/22  0638 10/27/22  0619   WBC 10*3/mm3 6.30 8.06 7.39 8.03 9.04   HEMOGLOBIN g/dL 14.1 13.7 13.8 13.9 14.5   PLATELETS 10*3/mm3 206 187 185 174 189     Results from last 7 days   Lab Units 11/02/22  0552 11/01/22 0631 10/31/22  0525 10/30/22  0938 10/28/22  0638 10/27/22  0619   SODIUM mmol/L 138 138 137 139 134* 136   POTASSIUM mmol/L 4.0 3.6 4.2 3.3* 4.0 4.1   CHLORIDE mmol/L 104 107 105 108* 104 106   CO2 mmol/L 21.0* 22.0 22.0 22.7 23.0 21.3*   BUN mg/dL 16 13 14 13 13 18   CREATININE mg/dL 0.71 0.71 0.75 0.70 0.64 0.74   CALCIUM mg/dL 8.7 8.5* 8.5* 8.7 8.1* 8.2*   GLUCOSE mg/dL 154* 117* 116* 54* 211* 243*       Microbiology Results (last 10 days)     Procedure Component Value - Date/Time    Urine Culture - Urine, Urine, Random Void [[id number]]  (Abnormal)  (Susceptibility) Collected: 10/26/22 0023    Lab Status: Final result Specimen: Urine, Random Void Updated: 10/27/22 1031     Urine Culture >100,000 CFU/mL Escherichia coli    Narrative:      Colonization of the urinary tract without infection is common. Treatment is discouraged unless the  patient is symptomatic, pregnant, or undergoing an invasive urologic procedure.    Susceptibility      Escherichia coli      KRISTINA      Ampicillin Susceptible      Ampicillin + Sulbactam Susceptible      Cefazolin Susceptible      Cefepime Susceptible      Ceftazidime Susceptible      Ceftriaxone Susceptible      Gentamicin Susceptible      Levofloxacin Susceptible      Nitrofurantoin Susceptible      Piperacillin + Tazobactam Susceptible      Trimethoprim + Sulfamethoxazole Susceptible                               Microbiology Results (last 10 days)     Procedure Component Value - Date/Time    Urine Culture - Urine, Urine, Random Void [[id number]]  (Abnormal)  (Susceptibility) Collected: 10/26/22 0023    Lab Status: Final result Specimen: Urine, Random Void Updated: 10/27/22 1031     Urine Culture >100,000 CFU/mL Escherichia coli    Narrative:      Colonization of the urinary tract without infection is common. Treatment is discouraged unless the patient is symptomatic, pregnant, or undergoing an invasive urologic procedure.    Susceptibility      Escherichia coli      KRISTINA      Ampicillin Susceptible      Ampicillin + Sulbactam Susceptible      Cefazolin Susceptible      Cefepime Susceptible      Ceftazidime Susceptible      Ceftriaxone Susceptible      Gentamicin Susceptible      Levofloxacin Susceptible      Nitrofurantoin Susceptible      Piperacillin + Tazobactam Susceptible      Trimethoprim + Sulfamethoxazole Susceptible                                   Assessment:    Pemphigus vulgaris  MRSA colonization  Leukocytosis secondary to steroid use  Metabolic encephalopathy secondary to UTI-urine culture positive for E. coli.  Recommendations/discussion  · Agitation and restlessness could be due to urinary tract infection.  · Antibiotics were simplified to cefazolin based on the sensitivity of E. Coli.  · Completed antibiotic treatment on 10/31/2022.  · Observe off of antibiotic therapy  · Continue with local  care.  Faizan Wilder MD  11/2/2022  20:06 EDT    Much of this encounter note is an electronic transcription/translation of spoken language to printed text. The electronic translation of spoken language may permit erroneous, or at times, nonsensical words or phrases to be inadvertently transcribed; Although I have reviewed the note for such errors, some may still exist

## 2022-11-03 NOTE — PLAN OF CARE
Goal Outcome Evaluation:  Plan of Care Reviewed With: patient        Progress: no change  Outcome Evaluation: pt's scabs/blisters appear to be improving, drsg change to L foot completed this afternoon and pt was cooperative and held her foot up for me, pt was mostly cooperative with care today and took her meds easily but did have some episodes of agitation, she became the most agitated when her  was here and she wanted him to leave, psych MD adjusted her meds today, turning q2h, posey and wrists restraints remain in place and new order obtained, pt ref to eat meals today but did eat some crackers, she did drink many cups of water and voided large amounts, pt seems to understand most of what we are saying to her but does have expressive aphasia so we are usually unable to understand what she is trying to say to us

## 2022-11-04 LAB
DX PRELIMINARY: NORMAL
GLUCOSE BLDC GLUCOMTR-MCNC: 148 MG/DL (ref 70–130)
GLUCOSE BLDC GLUCOMTR-MCNC: 149 MG/DL (ref 70–130)
GLUCOSE BLDC GLUCOMTR-MCNC: 150 MG/DL (ref 70–130)
GLUCOSE BLDC GLUCOMTR-MCNC: 179 MG/DL (ref 70–130)
LAB AP CASE REPORT: NORMAL
LAB AP DIAGNOSIS COMMENT: NORMAL
PATH REPORT.FINAL DX SPEC: NORMAL
PATH REPORT.GROSS SPEC: NORMAL

## 2022-11-04 PROCEDURE — 63710000001 PREDNISONE PER 5 MG: Performed by: HOSPITALIST

## 2022-11-04 PROCEDURE — 97530 THERAPEUTIC ACTIVITIES: CPT

## 2022-11-04 PROCEDURE — 82962 GLUCOSE BLOOD TEST: CPT

## 2022-11-04 PROCEDURE — 25010000002 ENOXAPARIN PER 10 MG: Performed by: INTERNAL MEDICINE

## 2022-11-04 RX ORDER — OLANZAPINE 7.5 MG/1
7.5 TABLET ORAL 2 TIMES DAILY
Status: DISCONTINUED | OUTPATIENT
Start: 2022-11-04 | End: 2022-11-06 | Stop reason: HOSPADM

## 2022-11-04 RX ORDER — NEBIVOLOL 5 MG/1
2.5 TABLET ORAL
Status: DISCONTINUED | OUTPATIENT
Start: 2022-11-05 | End: 2022-11-06 | Stop reason: HOSPADM

## 2022-11-04 RX ADMIN — OLANZAPINE 5 MG: 5 TABLET ORAL at 09:10

## 2022-11-04 RX ADMIN — MICONAZOLE NITRATE 1 APPLICATION: 20 CREAM TOPICAL at 21:14

## 2022-11-04 RX ADMIN — ENOXAPARIN SODIUM 40 MG: 100 INJECTION SUBCUTANEOUS at 21:00

## 2022-11-04 RX ADMIN — MUPIROCIN 1 APPLICATION: 20 OINTMENT TOPICAL at 10:00

## 2022-11-04 RX ADMIN — DORZOLAMIDE HYDROCHLORIDE 1 DROP: 20 SOLUTION/ DROPS OPHTHALMIC at 21:00

## 2022-11-04 RX ADMIN — NEBIVOLOL 5 MG: 5 TABLET ORAL at 09:10

## 2022-11-04 RX ADMIN — INSULIN GLARGINE-YFGN 10 UNITS: 100 INJECTION, SOLUTION SUBCUTANEOUS at 23:28

## 2022-11-04 RX ADMIN — MUPIROCIN 1 APPLICATION: 20 OINTMENT TOPICAL at 21:02

## 2022-11-04 RX ADMIN — DIVALPROEX SODIUM 250 MG: 125 CAPSULE, COATED PELLETS ORAL at 09:10

## 2022-11-04 RX ADMIN — LOSARTAN POTASSIUM 100 MG: 100 TABLET, FILM COATED ORAL at 09:10

## 2022-11-04 RX ADMIN — DIVALPROEX SODIUM 250 MG: 125 CAPSULE, COATED PELLETS ORAL at 16:30

## 2022-11-04 RX ADMIN — PANTOPRAZOLE SODIUM 40 MG: 40 TABLET, DELAYED RELEASE ORAL at 05:52

## 2022-11-04 RX ADMIN — BRIMONIDINE TARTRATE 1 DROP: 2 SOLUTION OPHTHALMIC at 21:01

## 2022-11-04 RX ADMIN — PREDNISONE 10 MG: 10 TABLET ORAL at 09:10

## 2022-11-04 RX ADMIN — MICONAZOLE NITRATE 1 APPLICATION: 20 CREAM TOPICAL at 10:00

## 2022-11-04 RX ADMIN — PAROXETINE HYDROCHLORIDE 10 MG: 10 TABLET, FILM COATED ORAL at 09:10

## 2022-11-04 NOTE — PLAN OF CARE
Goal Outcome Evaluation:  Plan of Care Reviewed With: patient        Progress: no change  Outcome Evaluation: vss, pt calm and cooperative since last night, took all her med with sips of water. no agitation or aggresiveness noted, still on posey and soft wrist restraint, turned to sides, bed alarm for safety, continue to monitor the pt.

## 2022-11-04 NOTE — PLAN OF CARE
Goal Outcome Evaluation:  Plan of Care Reviewed With: patient        Progress: no change  Outcome Evaluation: Pt seen this date for OT/PT co tx to maximize therapeutic benefit. Pt asleep upon arrival, restraints in place, arousable for therapy. Pt required mod Ax2 for bed mobility, briefly sat on EOB, CGA for sitting balance. Pt engaged in UE AAROM in shoulder/elbow planes to maintain joint mobility however pt resistive throughout. Overall limited by cognition, aphasia, agitation and impaired mobility/ (I) with ADLs. Plans to return to LTC at d/c. Decreasing freq to 2x/week.

## 2022-11-04 NOTE — PLAN OF CARE
Goal Outcome Evaluation:  Plan of Care Reviewed With: patient           Outcome Evaluation: Patient sleeping soundly when entered but arousable. Pt assisted with some UE ROM, up to EOB with modAx2 with head of bed elevated, sat briefly on EOB with CGA once positioned. Patient limited by cognition, decreased mobility prior to admission, aphasia, very limited progress with PT. ANticipate patient will return to nursing home at d/c.

## 2022-11-04 NOTE — PROGRESS NOTES
"Nutrition Services    Patient Name:  Darby Workman  YOB: 1945  MRN: [id number]  Admit Date:  10/18/2022      CLINICAL NUTRITION ASSESSMENT     Severe Malnutrition  based on ASPEN/AND, pt meets criteria for nutrition dx of severe malnutrition of acute disease    Encounter Information         Reason for Encounter Skin screen f/u     Admitting Diagnosis Pemphigus vulgaris     Pertinent Medical History DM2, CAD, CVA, HTN, HLD, dementia     Current Issues Pemphigus vulgaris     11/4 Pt remains to be confused, agitated combative (at times) and in restraints. She has not been eating besides crackers and peanut butter sometimes. She has not returned to her baseline mentation. Once she can be restraint free for 24hrs, she will d/c to [facility] psych facility. Will continue to follow     10/27 Pt is currently encephalopathic and agitated possibly related to her UTI. RD s/w patient's RN who stated she has been really agitated and has not been eating or drinking. She does not think a supplement is appropriate right now due to patient's current mental state. RN reports the patient was cooperative last week, eating fine, and oriented. RD to monitor for improvements in pt's mentation and behavior to assess need for ONS potential acute malnutrition due to lack of po intake.      Current Nutrition Orders & Evaluation of Intake       Oral Nutrition     Current PO Diet Diet Regular; Consistent Carbohydrate   Supplement    PO Evaluation     Trending % PO Intake Minimal     Factors Affecting Intake AMS    --  Anthropometrics          Height    Weight Height: 165.1 cm (65\")  Weight: 76.2 kg (167 lb 15.9 oz) (11/03/22 1022)    BMI kg/m2 Body mass index is 27.96 kg/m².    Weight Trend UTD wt changes     Weight History  Wt Readings from Last 15 Encounters:   11/03/22 1022 76.2 kg (167 lb 15.9 oz)   10/18/22 1526 74.3 kg (163 lb 11.2 oz)   04/20/20 0417 52.4 kg (115 lb 8.3 oz)   04/19/20 0500 54.1 kg (119 lb 4.3 oz)   04/18/20 " 0640 56 kg (123 lb 7.3 oz)   04/17/20 1211 58.3 kg (128 lb 8.5 oz)   04/17/20 0504 58.3 kg (128 lb 8.5 oz)   04/16/20 0539 59 kg (130 lb 1.1 oz)   04/15/20 0537 56.9 kg (125 lb 7.1 oz)   04/14/20 0600 55.2 kg (121 lb 11.1 oz)   04/14/20 0529 55.2 kg (121 lb 11.1 oz)   04/13/20 0540 54.4 kg (119 lb 14.9 oz)   04/12/20 0502 55.6 kg (122 lb 9.2 oz)   04/11/20 0313 57.5 kg (126 lb 12.2 oz)   04/10/20 0602 57 kg (125 lb 9.6 oz)   04/09/20 0623 56.9 kg (125 lb 8 oz)   04/08/20 0500 55.9 kg (123 lb 3.8 oz)   04/07/20 0558 58.2 kg (128 lb 4.9 oz)   04/06/20 0200 61.7 kg (136 lb 0.4 oz)   04/05/20 0459 60.3 kg (132 lb 15 oz)   04/04/20 0502 62.3 kg (137 lb 5.6 oz)   04/03/20 0546 62.3 kg (137 lb 5.6 oz)   04/02/20 0536 61.7 kg (136 lb)   04/01/20 0636 65.6 kg (144 lb 11.2 oz)   03/31/20 0516 64.5 kg (142 lb 4.8 oz)   03/30/20 0510 63.7 kg (140 lb 8 oz)   03/29/20 0611 62.9 kg (138 lb 9.6 oz)   03/28/20 0500 59.9 kg (132 lb 0.9 oz)   03/27/20 1644 58.9 kg (129 lb 13.6 oz)   03/27/20 1029 60.3 kg (133 lb)   09/05/19 1100 60 kg (132 lb 4.4 oz)   07/31/19 1554 60.9 kg (134 lb 3.2 oz)   07/21/19 0200 60.2 kg (132 lb 11.5 oz)   07/20/19 0400 61.8 kg (136 lb 3.9 oz)   07/17/19 0539 60.7 kg (133 lb 13.1 oz)   07/11/19 0922 58.5 kg (129 lb)   07/09/19 0833 60.8 kg (134 lb)   06/28/19 0928 59.4 kg (131 lb)   06/25/19 1331 60.8 kg (134 lb)   06/04/19 1800 59.9 kg (132 lb)   05/22/19 0500 64.9 kg (143 lb)   05/21/19 2145 64.5 kg (142 lb 4.8 oz)   05/21/19 1544 63.5 kg (140 lb)   05/18/19 0511 65 kg (143 lb 6.4 oz)   05/17/19 0500 67.1 kg (148 lb)   05/16/19 0500 69.6 kg (153 lb 6.4 oz)   05/15/19 1159 66.2 kg (146 lb)   05/14/19 1635 66.5 kg (146 lb 9.6 oz)   05/14/19 2116 66.2 kg (146 lb)   05/14/19 2115 66.2 kg (146 lb)   05/14/19 2115 66.2 kg (146 lb)        Labs        Pertinent Labs Reviewed, listed below     Results from last 7 days   Lab Units 11/02/22  0552 11/01/22  0631 10/31/22  0525   SODIUM mmol/L 138 138 137   POTASSIUM  mmol/L 4.0 3.6 4.2   CHLORIDE mmol/L 104 107 105   CO2 mmol/L 21.0* 22.0 22.0   BUN mg/dL 16 13 14   CREATININE mg/dL 0.71 0.71 0.75   CALCIUM mg/dL 8.7 8.5* 8.5*   GLUCOSE mg/dL 154* 117* 116*     Results from last 7 days   Lab Units 11/02/22  0552   HEMOGLOBIN g/dL 14.1   HEMATOCRIT % 42.9   WBC 10*3/mm3 6.30     Results from last 7 days   Lab Units 11/02/22  0552 11/01/22  0631 10/30/22  0938   PLATELETS 10*3/mm3 206 187 185     No results found for: COVID19  Lab Results   Component Value Date    HGBA1C 11.30 (H) 10/19/2022          Medications            Scheduled Medications atorvastatin, 10 mg, Oral, Nightly  brimonidine, 1 drop, Both Eyes, BID  Divalproex Sodium, 250 mg, Oral, TID  dorzolamide, 1 drop, Both Eyes, BID  enoxaparin, 40 mg, Subcutaneous, Q24H  insulin glargine, 10 Units, Subcutaneous, Nightly  insulin lispro, 0-7 Units, Subcutaneous, 4x Daily With Meals & Nightly  losartan, 100 mg, Oral, Q24H  miconazole, 1 application, Topical, BID  mupirocin, 1 application, Topical, Q12H  [START ON 11/5/2022] nebivolol, 2.5 mg, Oral, Q24H  OLANZapine, 7.5 mg, Oral, BID  pantoprazole, 40 mg, Oral, Q AM  PARoxetine, 10 mg, Oral, Daily  predniSONE, 10 mg, Oral, Daily With Breakfast        Infusions      PRN Medications •  clonazePAM  •  OLANZapine  •  [COMPLETED] Insert peripheral IV **AND** sodium chloride     Physical Findings         Physical Appearance Alert, agitated, confused    Skin  Blisters and pressure injury    --  Malnutrition Severity Assessment      Patient meets criteria for : Severe Malnutrition (based on ASPEN/AND, pt meets criteria for nutrition dx of severe malnutrition of acute disease)  Malnutrition Type (last 8 hours)     Malnutrition Severity Assessment     Row Name 11/04/22 0955       Malnutrition Severity Assessment    Malnutrition Type Acute Disease or Injury - Related Malnutrition    Row Name 11/04/22 0955       Insufficient Energy Intake     Insufficient Energy Intake Findings Severe     "Insufficient Energy Intake  < or equal to 50% of est. energy requirement for > or equal to 5d)    Row Name 11/04/22 0955       Muscle Loss    Pro[place] Region Severe - deep hollowing/scooping, lack of muscle to touch, facial bones well defined    Dorsal Hand Region Severe - prominent depression    Row Name 11/04/22 0955       Fat Loss    Orbital Region  Severe - pronounced hollowness/depression, dark circles, loose saggy skin    Row Name 11/04/22 0955       Fluid Accumulation (Edema)    Fluid Acumulation Findings Moderate    Fluid Accumulation  Moderate equals 2+ pitting edema    Row Name 11/04/22 0955       Declining Functional Status    Declining Functional Status Findings Measurably Reduced  continued confusion/disorientation/combativeness, requires sedation and restraints    Row Name 11/04/22 0955       Criteria Met (Must meet criteria for severity in at least 2 of these categories: M Wasting, Fat Loss, Fluid, Secondary Signs, Wt. Status, Intake)    Patient meets criteria for  Severe Malnutrition  based on ASPEN/AND, pt meets criteria for nutrition dx of severe malnutrition of acute disease                   Estimated/Assessed Needs       Energy Requirements    Height for Calculation  Height: 165.1 cm (65\")   Weight for Calculation 167# (76.2 kg)    Method for Estimation  25 kcal/kg, 30 kcal/kg   EST Needs (kcal/day) 8169-0844       Protein Requirements    Weight for Calculation 167# (76.2 kg)    EST Protein Needs (g/kg) 1.0 - 1.2 gm/kg   EST Daily Needs (g/day) 76-91       Fluid Requirements     Method for Estimation 1 mL/kcal    Estimated Needs (mL/day) 2098-7581       Fluid Deficit    Current Na Level (mEq/L)    Desired Na Level (mEq/L)    Estimated Fluid Deficit (L)           Intervention Goal        Intervention Goal(s) Nutrition support treatment, Reduce/improve symptoms, Disease management/therapy, Increase intake, Maintain weight and PO intake goal %: 75     Nutrition Intervention        RD Action Follow Tx " Progress and Care plan reviewed     Nutrition Prescription          Diet Prescription Consistent carb     Supplement Prescription    EN/PN Prescription     Prescription Ordered      Monitor/Evaluation        Monitor Per protocol     RD to follow up per protocol.    Electronically signed by:  Serena Rasmussen RD  11/04/22 16:56 EDT

## 2022-11-04 NOTE — PLAN OF CARE
Goal Outcome Evaluation:      Calm most of shift, slept. Attempts to pull lines, anything close to her and takes off clothes but not agitated. 1600 became agitated and combative suddenly. Finally calmed down and slept after 20 min. Posey and wrist restraints in place for safety.

## 2022-11-04 NOTE — PROGRESS NOTES
Despite a very aggressive psychopharmacotherapy, the patient continues to exhibit agitation at times.  She is resting comfortably this morning but continues to require restraints.  I have increased her Zyprexa to 7.5 mg twice daily.  It is my belief that if we do not begin to see some improvement with this aggressive pharmacotherapy we may need to consider that the patient is in a state of terminal agitation and a palliative consult may be warranted.

## 2022-11-04 NOTE — CASE MANAGEMENT/SOCIAL WORK
Continued Stay Note  [facility]     Patient Name: Darby Workman  MRN: [id number]  Today's Date: 11/4/2022    Admit Date: 10/18/2022    Plan: Return to [facility] bed hold- pending behaviors restraint free   Discharge Plan     Row Name 11/04/22 0916       Plan    Plan Return to [facility] bed hold- pending behaviors restraint free    Patient/Family in Agreement with Plan yes    Plan Comments Patient remains in restraints for safety. Patinet must be 24hr restraint/sitter free for eligibility to return to [facility]. She will need transport - likely EMS. CCP continues to follow. SAMIR BOOGIE               Discharge Codes    No documentation.               Expected Discharge Date and Time     Expected Discharge Date Expected Discharge Time    Nov 6, 2022             SAMIR Ashraf

## 2022-11-04 NOTE — PROGRESS NOTES
"  Infectious Diseases Progress Note    Faizan Wilder MD     [facility]  [place]: 15 days  Patient Identification:  Name: Darby Workman  Age: 77 y.o.  Sex: female  :  1945  MRN: [id number]         Primary Care Physician: Eric Matta MD            Subjective: Comfortable does not engage.  Interval History: See consultation note.    Objective:    Scheduled Meds:atorvastatin, 10 mg, Oral, Nightly  brimonidine, 1 drop, Both Eyes, BID  Divalproex Sodium, 250 mg, Oral, TID  dorzolamide, 1 drop, Both Eyes, BID  enoxaparin, 40 mg, Subcutaneous, Q24H  insulin lispro, 0-7 Units, Subcutaneous, 4x Daily With Meals & Nightly  losartan, 100 mg, Oral, Q24H  miconazole, 1 application, Topical, BID  mupirocin, 1 application, Topical, Q12H  nebivolol, 5 mg, Oral, Q24H  OLANZapine, 5 mg, Oral, BID  pantoprazole, 40 mg, Oral, Q AM  PARoxetine, 10 mg, Oral, Daily  predniSONE, 10 mg, Oral, Daily With Breakfast      Continuous Infusions:     Vital signs in last 24 hours:  Temp:  [97 °F (36.1 °C)-98.2 °F (36.8 °C)] 98.2 °F (36.8 °C)  Heart Rate:  [56-67] 67  Resp:  [16] 16  BP: (162-180)/(71-83) 165/71    Intake/Output:    Intake/Output Summary (Last 24 hours) at 2022 0958  Last data filed at 2022 0849  Gross per 24 hour   Intake 815 ml   Output --   Net 815 ml       Exam:  /71 (BP Location: Left arm, Patient Position: Lying)   Pulse 67   Temp 98.2 °F (36.8 °C) (Axillary)   Resp 16   Ht 165.1 cm (65\")   Wt 76.2 kg (167 lb 15.9 oz)   SpO2 96%   BMI 27.96 kg/m²   Patient is examined using the personal protective equipment as per guidelines from infection control for this particular patient as enacted.  Hand washing was performed before and after patient interaction.  General Appearance: Comfortable but at times agitated.                          Head:    Normocephalic, without obvious abnormality, atraumatic                           Eyes:    PERRL, conjunctivae/corneas clear, EOM's intact, both " eyes                         Throat:   Lips, tongue, gums normal; oral mucosa pink and moist                           Neck:   Supple, symmetrical, trachea midline, no JVD                         Lungs:    Clear to auscultation bilaterally, respirations unlabored                 Chest Wall:    No tenderness or deformity                          Heart:  S1-S2 regular                  Abdomen:   Soft nontender                 Extremities:   Extremities normal, atraumatic, no cyanosis or edema, hand lesions are much better right foot is dressed.                      Neurologic: Significant aphasia due to prior stroke       Data Review:    I reviewed the patient's new clinical results.  Results from last 7 days   Lab Units 11/02/22  0552 11/01/22  0631 10/30/22  0938   WBC 10*3/mm3 6.30 8.06 7.39   HEMOGLOBIN g/dL 14.1 13.7 13.8   PLATELETS 10*3/mm3 206 187 185     Results from last 7 days   Lab Units 11/02/22 0552 11/01/22  0631 10/31/22  0525 10/30/22  0938   SODIUM mmol/L 138 138 137 139   POTASSIUM mmol/L 4.0 3.6 4.2 3.3*   CHLORIDE mmol/L 104 107 105 108*   CO2 mmol/L 21.0* 22.0 22.0 22.7   BUN mg/dL 16 13 14 13   CREATININE mg/dL 0.71 0.71 0.75 0.70   CALCIUM mg/dL 8.7 8.5* 8.5* 8.7   GLUCOSE mg/dL 154* 117* 116* 54*       Microbiology Results (last 10 days)     Procedure Component Value - Date/Time    Urine Culture - Urine, Urine, Random Void [[id number]]  (Abnormal)  (Susceptibility) Collected: 10/26/22 0023    Lab Status: Final result Specimen: Urine, Random Void Updated: 10/27/22 1031     Urine Culture >100,000 CFU/mL Escherichia coli    Narrative:      Colonization of the urinary tract without infection is common. Treatment is discouraged unless the patient is symptomatic, pregnant, or undergoing an invasive urologic procedure.    Susceptibility      Escherichia coli      KRISTINA      Ampicillin Susceptible      Ampicillin + Sulbactam Susceptible      Cefazolin Susceptible      Cefepime Susceptible       Ceftazidime Susceptible      Ceftriaxone Susceptible      Gentamicin Susceptible      Levofloxacin Susceptible      Nitrofurantoin Susceptible      Piperacillin + Tazobactam Susceptible      Trimethoprim + Sulfamethoxazole Susceptible                               Microbiology Results (last 10 days)     Procedure Component Value - Date/Time    Urine Culture - Urine, Urine, Random Void [[id number]]  (Abnormal)  (Susceptibility) Collected: 10/26/22 0023    Lab Status: Final result Specimen: Urine, Random Void Updated: 10/27/22 1031     Urine Culture >100,000 CFU/mL Escherichia coli    Narrative:      Colonization of the urinary tract without infection is common. Treatment is discouraged unless the patient is symptomatic, pregnant, or undergoing an invasive urologic procedure.    Susceptibility      Escherichia coli      KRISTINA      Ampicillin Susceptible      Ampicillin + Sulbactam Susceptible      Cefazolin Susceptible      Cefepime Susceptible      Ceftazidime Susceptible      Ceftriaxone Susceptible      Gentamicin Susceptible      Levofloxacin Susceptible      Nitrofurantoin Susceptible      Piperacillin + Tazobactam Susceptible      Trimethoprim + Sulfamethoxazole Susceptible                                   Assessment:    Pemphigus vulgaris  MRSA colonization  Leukocytosis secondary to steroid use  Metabolic encephalopathy secondary to UTI-urine culture positive for E. coli.  Recommendations/discussion  · Agitation and restlessness could be due to urinary tract infection.  · Antibiotics were simplified to cefazolin based on the sensitivity of E. Coli.  · Completed antibiotic treatment on 10/31/2022.  · Observe off of antibiotic therapy  · Continue with local care.  Faizan Wilder MD  11/4/2022  09:58 EDT    Much of this encounter note is an electronic transcription/translation of spoken language to printed text. The electronic translation of spoken language may permit erroneous, or at times, nonsensical words or  phrases to be inadvertently transcribed; Although I have reviewed the note for such errors, some may still exist

## 2022-11-04 NOTE — THERAPY TREATMENT NOTE
Patient Name: Darby Workman  : 1945    MRN: [id number]                              Today's Date: 2022       Admit Date: 10/18/2022    Visit Dx:     ICD-10-CM ICD-9-CM   1. Pemphigus vulgaris  L10.0 694.4   2. Dementia without behavioral disturbance ([facility])  F03.90 294.20   3. Cerebrovascular accident (CVA), unspecified mechanism ([facility])  I63.9 434.91   4. Type 2 diabetes mellitus with hyperglycemia, unspecified whether long term insulin use ([facility])  E11.65 250.00     Patient Active Problem List   Diagnosis   • Hypertensive crisis   • Diabetes mellitus (HCC)   • Hyperlipidemia   • Hypertension   • Elevated troponin   • Acute cerebrovascular accident (CVA) of cerebellum (HCC)   • Right-sided headache   • Acute ischemic stroke (HCC)   • Embolic stroke (HCC)   • Anticoagulated by anticoagulation treatment   • Stroke (cerebrum) (HCC)   • Dysthymic disorder   • Hypotension   • Upper GI bleed   • Uremic encephalopathy   • Acute kidney injury (HCC)   • Acute pancreatitis   • Hemorrhagic shock (HCC)   • Thrombocytopenia (HCC)   • Type 2 diabetes mellitus with hyperglycemia (HCC)   • Dementia without behavioral disturbance (HCC)   • Acute posthemorrhagic anemia   • Pemphigus vulgaris     Past Medical History:   Diagnosis Date   • Acute cerebrovascular accident (CVA) of cerebellum (HCC)    • Acute ischemic stroke (HCC)    • Arthritis    • Coronary artery disease    • CVA (cerebral vascular accident) (HCC)    • Diabetes mellitus (HCC)    • Heart attack (HCC)    • History of blood clots    • Hyperlipidemia    • Hypertension    • Vision loss      Past Surgical History:   Procedure Laterality Date   • CAROTID ENDARTERECTOMY Left 2019    Procedure: Left carotid endarterectomy;  Surgeon: Rupert Oliva MD;  Location: [facility] OR;  Service: Neurosurgery   • EMBOLECTOMY Left 2019    Procedure: CEREBRAL ANGIOGRAM, LEFT INTERNAL CAROTID ARTERY STENT;  Surgeon: Rupert Oliva MD;  Location: [facility]  OR 18/19;  Service: Neurosurgery   • ENDOSCOPY N/A 3/28/2020    Procedure: ESOPHAGOGASTRODUODENOSCOPY AT BEDSIDE WITH EPI INJECTION AND GOLD PROBE CAUTERIZATION;  Surgeon: Malathi Bright MD;  Location: [facility] ENDOSCOPY;  Service: Gastroenterology;  Laterality: N/A;  PRE GI BLEED  POST EROSIVE GASTRITIS, DUODENAL ULCER WITH CLOT      General Information     Row Name 11/04/22 1355          Physical Therapy Time and Intention    Document Type therapy note (daily note)  -EM     Mode of Treatment co-treatment;physical therapy;occupational therapy  -EM     Row Name 11/04/22 1355          General Information    Existing Precautions/Restrictions fall  -EM           User Key  (r) = Recorded By, (t) = Taken By, (c) = Cosigned By    Initials Name Provider Type    Vivian Thompson PT Physical Therapist               Mobility     Row Name 11/04/22 7316          Bed Mobility    Supine-Sit [place] (Bed Mobility) moderate assist (50% patient effort);2 person assist;verbal cues  -EM     Sit-Supine [place] (Bed Mobility) moderate assist (50% patient effort);2 person assist;verbal cues  -EM     Assistive Device (Bed Mobility) head of bed elevated  -EM           User Key  (r) = Recorded By, (t) = Taken By, (c) = Cosigned By    Initials Name Provider Type    Vivian Thompson PT Physical Therapist               Obj/Interventions     Row Name 11/04/22 3789          Motor Skills    Therapeutic Exercise other (see comments)  AAROM kamala UEs  -EM     Row Name 11/04/22 6230          Balance    Comment, Balance pt sat up on EOB briefly, keeps eyes closed, total assist to position patient on EOB but able to sit with CGA once positioned  -EM           User Key  (r) = Recorded By, (t) = Taken By, (c) = Cosigned By    Initials Name Provider Type    Vivian Thompson PT Physical Therapist               Goals/Plan    No documentation.                Clinical Impression     Row Name 11/04/22 8269          Pain     Pretreatment Pain Rating 0/10 - no pain  -EM     Pre/Posttreatment Pain Comment pt sleeping soundly when entered room, no indication of pain throughout session  -EM     Row Name 11/04/22 1356          Plan of Care Review    Plan of Care Reviewed With patient  -EM     Outcome Evaluation Patient sleeping soundly when entered but arousable. Pt assisted with some UE ROM, up to EOB with modAx2 with head of bed elevated, sat briefly on EOB with CGA once positioned. Patient limited by cognition, decreased mobility prior to admission, aphasia, very limited progress with PT. ANticipate patient will return to nursing home at d/c.  -EM     Row Name 11/04/22 1356          Positioning and Restraints    Pre-Treatment Position in bed  -EM     Post Treatment Position bed  -EM     In Bed side lying right;notified nsg;call light within reach;exit alarm on  -EM     Restraints reapplied:;soft limb;vest  -EM           User Key  (r) = Recorded By, (t) = Taken By, (c) = Cosigned By    Initials Name Provider Type    Vivian Thompson, PT Physical Therapist               Outcome Measures     Row Name 11/04/22 3268 11/04/22 0800       How much help from another person do you currently need...    Turning from your back to your side while in flat bed without using bedrails? 2  -EM 2  -KL    Moving from lying on back to sitting on the side of a flat bed without bedrails? 2  -EM 2  -KL    Moving to and from a bed to a chair (including a wheelchair)? 1  -EM 2  -KL    Standing up from a chair using your arms (e.g., wheelchair, bedside chair)? 1  -EM 1  -KL    Climbing 3-5 steps with a railing? 1  -EM 1  -KL    To walk in hospital room? 1  -EM 1  -KL    AM-PAC 6 Clicks Score (PT) 8  -EM 9  -KL    Highest level of mobility 3 --> Sat at edge of bed  -EM 3 --> Sat at edge of bed  -KL          User Key  (r) = Recorded By, (t) = Taken By, (c) = Cosigned By    Initials Name Provider Type    Emily Jiménez, RN Registered Nurse    KAMERON Ha  Vivian MANE, PT Physical Therapist                             Physical Therapy Education     Title: PT OT SLP Therapies (In Progress)     Topic: Physical Therapy (In Progress)     Point: Mobility training (In Progress)     Learning Progress Summary           Patient Acceptance, E, NR by EM at 11/4/2022 1358    Acceptance, E,TB, NL by MT at 11/4/2022 0517    Acceptance, E,TB, NL,NR by CS at 11/1/2022 1147    Acceptance, E,TB, NL by MT at 10/30/2022 0457    Acceptance, E, NL by EM at 10/28/2022 1648    Acceptance, E, NR by EM at 10/26/2022 1537    Acceptance, E,D, NR by  at 10/24/2022 1539    Acceptance, E, NR by EM at 10/21/2022 1507    Nonacceptance, E, NR by LW at 10/19/2022 1557                               User Key     Initials Effective Dates Name Provider Type Discipline    PC 06/16/21 -  Theodora Silverio, PT Physical Therapist PT    EM 06/16/21 -  Vivian Ha, PT Physical Therapist PT    MT 06/16/21 -  Jody Camp, RN Registered Nurse Nurse    LW 06/10/22 -  Laura Germain, PT Physical Therapist PT     09/22/22 -  Pennie Jenkins, HESHAM Physical Therapist PT              PT Recommendation and Plan     Plan of Care Reviewed With: patient  Outcome Evaluation: Patient sleeping soundly when entered but arousable. Pt assisted with some UE ROM, up to EOB with modAx2 with head of bed elevated, sat briefly on EOB with CGA once positioned. Patient limited by cognition, decreased mobility prior to admission, aphasia, very limited progress with PT. ANticipate patient will return to nursing home at d/c.     Time Calculation:    PT Charges     Row Name 11/04/22 1359             Time Calculation    Start Time 1320  -EM      Stop Time 1337  -EM      Time Calculation (min) 17 min  -EM      PT Received On 11/04/22  -EM      PT - Next Appointment 11/08/22  -EM         Time Calculation- PT    Total Timed Code Minutes- PT 17 minute(s)  -EM         Timed Charges    08583 - PT Therapeutic Exercise Minutes 5  -EM       93141 - PT Therapeutic Activity Minutes 12  -EM         Total Minutes    Timed Charges Total Minutes 17  -EM       Total Minutes 17  -EM            User Key  (r) = Recorded By, (t) = Taken By, (c) = Cosigned By    Initials Name Provider Type    Vivian Thompson PT Physical Therapist              Therapy Charges for Today     Code Description Service Date Service Provider Modifiers Qty    39149986976 HC PT THERAPEUTIC ACT EA 15 MIN 11/4/2022 Vivian Ha PT GP 1          PT G-Codes  Outcome Measure Options: AM-PAC 6 Clicks Daily Activity (OT)  AM-PAC 6 Clicks Score (PT): 8  AM-PAC 6 Clicks Score (OT): 10  Modified Kehinde Scale: 5 - Severe disability.  Bedridden, incontinent, and requiring constant nursing care and attention.    Vivian Ha, PT  11/4/2022

## 2022-11-04 NOTE — THERAPY TREATMENT NOTE
Patient Name: Darby Workman  : 1945    MRN: [id number]                              Today's Date: 2022       Admit Date: 10/18/2022    Visit Dx:     ICD-10-CM ICD-9-CM   1. Pemphigus vulgaris  L10.0 694.4   2. Dementia without behavioral disturbance ([facility])  F03.90 294.20   3. Cerebrovascular accident (CVA), unspecified mechanism ([facility])  I63.9 434.91   4. Type 2 diabetes mellitus with hyperglycemia, unspecified whether long term insulin use ([facility])  E11.65 250.00     Patient Active Problem List   Diagnosis   • Hypertensive crisis   • Diabetes mellitus (HCC)   • Hyperlipidemia   • Hypertension   • Elevated troponin   • Acute cerebrovascular accident (CVA) of cerebellum (HCC)   • Right-sided headache   • Acute ischemic stroke (HCC)   • Embolic stroke (HCC)   • Anticoagulated by anticoagulation treatment   • Stroke (cerebrum) (HCC)   • Dysthymic disorder   • Hypotension   • Upper GI bleed   • Uremic encephalopathy   • Acute kidney injury (HCC)   • Acute pancreatitis   • Hemorrhagic shock (HCC)   • Thrombocytopenia (HCC)   • Type 2 diabetes mellitus with hyperglycemia (HCC)   • Dementia without behavioral disturbance (HCC)   • Acute posthemorrhagic anemia   • Pemphigus vulgaris     Past Medical History:   Diagnosis Date   • Acute cerebrovascular accident (CVA) of cerebellum (HCC)    • Acute ischemic stroke (HCC)    • Arthritis    • Coronary artery disease    • CVA (cerebral vascular accident) (HCC)    • Diabetes mellitus (HCC)    • Heart attack (HCC)    • History of blood clots    • Hyperlipidemia    • Hypertension    • Vision loss      Past Surgical History:   Procedure Laterality Date   • CAROTID ENDARTERECTOMY Left 2019    Procedure: Left carotid endarterectomy;  Surgeon: Rupert Oliva MD;  Location: [facility] OR;  Service: Neurosurgery   • EMBOLECTOMY Left 2019    Procedure: CEREBRAL ANGIOGRAM, LEFT INTERNAL CAROTID ARTERY STENT;  Surgeon: Rupert Oliva MD;  Location: [facility]  OR 18/19;  Service: Neurosurgery   • ENDOSCOPY N/A 3/28/2020    Procedure: ESOPHAGOGASTRODUODENOSCOPY AT BEDSIDE WITH EPI INJECTION AND GOLD PROBE CAUTERIZATION;  Surgeon: Malathi Bright MD;  Location: [facility] ENDOSCOPY;  Service: Gastroenterology;  Laterality: N/A;  PRE GI BLEED  POST EROSIVE GASTRITIS, DUODENAL ULCER WITH CLOT      General Information     Row Name 11/04/22 1414          OT Time and Intention    Document Type therapy note (daily note)  -MW     Mode of Treatment co-treatment;occupational therapy;physical therapy  -     Row Name 11/04/22 1414          General Information    Patient Profile Reviewed yes  -MW     Existing Precautions/Restrictions fall  -MW     Row Name 11/04/22 1414          Cognition    Orientation Status (Cognition) unable/difficult to assess  -     Row Name 11/04/22 1414          Safety Issues, Functional Mobility    Impairments Affecting Function (Mobility) pain;strength;endurance/activity tolerance;cognition;balance;postural/trunk control;range of motion (ROM)  -           User Key  (r) = Recorded By, (t) = Taken By, (c) = Cosigned By    Initials Name Provider Type    MW Yudi Banegas OT Occupational Therapist                 Mobility/ADL's     Row Name 11/04/22 1415          Bed Mobility    Bed Mobility supine-sit;sit-supine  -MW     Supine-Sit [place] (Bed Mobility) moderate assist (50% patient effort);2 person assist;verbal cues  -MW     Sit-Supine [place] (Bed Mobility) moderate assist (50% patient effort);2 person assist;verbal cues  -MW     Assistive Device (Bed Mobility) head of bed elevated  -MW     Comment, (Bed Mobility) brief time spent at EOB, as pt already retruning to supine after briefly upright with CGA for sitting balance  -     Row Name 11/04/22 1415          Self-Feeding Assessment/Training    [place] Level (Feeding) feeding skills;liquids to mouth;maximum assist (25% patient effort)  -MW     Position (Self-Feeding) sitting up in  bed  -           User Key  (r) = Recorded By, (t) = Taken By, (c) = Cosigned By    Initials Name Provider Type    Yudi Fajardo OT Occupational Therapist               Obj/Interventions     Row Name 11/04/22 1415          Shoulder (Therapeutic Exercise)    Shoulder AROM (Therapeutic Exercise) bilateral;aDduction;aBduction;10 repetitions;supine  -     Row Name 11/04/22 1415          Elbow/Forearm (Therapeutic Exercise)    Elbow/Forearm (Therapeutic Exercise) AAROM (active assistive range of motion)  -     Elbow/Forearm AAROM (Therapeutic Exercise) bilateral;flexion;extension;10 repetitions  -     Row Name 11/04/22 1415          Motor Skills    Therapeutic Exercise shoulder;elbow/forearm  -     Row Name 11/04/22 1415          Balance    Balance Assessment sitting static balance  -     Static Sitting Balance contact guard  -           User Key  (r) = Recorded By, (t) = Taken By, (c) = Cosigned By    Initials Name Provider Type    Yudi Fajardo OT Occupational Therapist               Goals/Plan    No documentation.                Clinical Impression     [facility] Name 11/04/22 1416          Pain Assessment    Pre/Posttreatment Pain Comment pt asleep upon arrival, no facial grimacing noted  -     Row Name 11/04/22 1416          Plan of Care Review    Plan of Care Reviewed With patient  -     Progress no change  -     Outcome Evaluation Pt seen this date for OT/PT co tx to maximize therapeutic benefit. Pt asleep upon arrival, restraints in place, arousable for therapy. Pt required mod Ax2 for bed mobility, briefly sat on EOB, CGA for sitting balance. Pt engaged in UE AAROM in shoulder/elbow planes to maintain joint mobility however pt resistive throughout. Overall limited by cognition, aphasia, agitation and impaired mobility/ (I) with ADLs. Plans to return to LTC at d/c. Decreasing freq to 2x/week.  -     Row Name 11/04/22 1416          Therapy Assessment/Plan (OT)    Therapy Frequency  (OT) 2 times/wk  -MW     Row Name 11/04/22 1416          Therapy Plan Review/Discharge Plan (OT)    Anticipated Discharge Disposition (OT) extended care facility  -MW     Row Name 11/04/22 1416          Vital Signs    O2 Delivery Pre Treatment room air  -MW     Pre Patient Position Supine  -MW     Intra Patient Position Sitting  -MW     Post Patient Position Supine  -MW     Row Name 11/04/22 1416          Positioning and Restraints    Pre-Treatment Position in bed  -MW     Post Treatment Position bed  -MW     In Bed notified nsg;exit alarm on  -MW     Restraints released:;reapplied:;soft limb;vest  -MW           User Key  (r) = Recorded By, (t) = Taken By, (c) = Cosigned By    Initials Name Provider Type    Yudi Fajardo, SAL Occupational Therapist               Outcome Measures     Row Name 11/04/22 1419          How much help from another is currently needed...    Putting on and taking off regular lower body clothing? 1  -MW     Bathing (including washing, rinsing, and drying) 1  -MW     Toileting (which includes using toilet bed pan or urinal) 1  -MW     Putting on and taking off regular upper body clothing 1  -MW     Taking care of personal grooming (such as brushing teeth) 1  -MW     Eating meals 2  -MW     AM-PAC 6 Clicks Score (OT) 7  -MW     Row Name 11/04/22 1358 11/04/22 0800       How much help from another person do you currently need...    Turning from your back to your side while in flat bed without using bedrails? 2  -EM 2  -KL    Moving from lying on back to sitting on the side of a flat bed without bedrails? 2  -EM 2  -KL    Moving to and from a bed to a chair (including a wheelchair)? 1  -EM 2  -KL    Standing up from a chair using your arms (e.g., wheelchair, bedside chair)? 1  -EM 1  -KL    Climbing 3-5 steps with a railing? 1  -EM 1  -KL    To walk in hospital room? 1  -EM 1  -KL    AM-PAC 6 Clicks Score (PT) 8  -EM 9  -KL    Highest level of mobility 3 --> Sat at edge of bed  -EM 3 -->  Sat at edge of bed  -    Row Name 11/04/22 1419          Functional Assessment    Outcome Measure Options AM-PAC 6 Clicks Daily Activity (OT)  -           User Key  (r) = Recorded By, (t) = Taken By, (c) = Cosigned By    Initials Name Provider Type    Emily Jiménez, RN Registered Nurse    Vivian Thompson, PT Physical Therapist     Makenzie Yudi OT Occupational Therapist                Occupational Therapy Education     Title: PT OT SLP Therapies (In Progress)     Topic: Occupational Therapy (In Progress)     Point: ADL training (In Progress)     Description:   Instruct learner(s) on proper safety adaptation and remediation techniques during self care or transfers.   Instruct in proper use of assistive devices.              Learning Progress Summary           Patient Acceptance, E,TB, NL by MT at 11/4/2022 0517    Acceptance, E,TB, NL by MT at 10/30/2022 0457    Acceptance, E, NL by  at 10/20/2022 1437    Comment: role of OT                   Point: Precautions (In Progress)     Description:   Instruct learner(s) on prescribed precautions during self-care and functional transfers.              Learning Progress Summary           Patient Acceptance, E,TB, NL by MT at 11/4/2022 0517    Acceptance, E,TB, NL by MT at 10/30/2022 0457    Acceptance, E, NL by  at 10/20/2022 1437    Comment: role of OT                   Point: Body mechanics (In Progress)     Description:   Instruct learner(s) on proper positioning and spine alignment during self-care, functional mobility activities and/or exercises.              Learning Progress Summary           Patient Acceptance, E,TB, NL by MT at 11/4/2022 0517    Acceptance, E,TB, NL by MT at 10/30/2022 0457    Acceptance, E, NL by  at 10/20/2022 1437    Comment: role of OT                               User Key     Initials Effective Dates Name Provider Type Discipline    MT 06/16/21 -  Jody Camp, RN Registered Nurse Nurse     08/20/21 -   Yudi Banegas OT Occupational Therapist OT              OT Recommendation and Plan  Planned Therapy Interventions (OT): activity tolerance training, functional balance retraining, occupation/activity based interventions, neuromuscular control/coordination retraining, patient/caregiver education/training, BADL retraining, ROM/therapeutic exercise, transfer/mobility retraining, strengthening exercise  Therapy Frequency (OT): 2 times/wk  Plan of Care Review  Plan of Care Reviewed With: patient  Progress: no change  Outcome Evaluation: Pt seen this date for OT/PT co tx to maximize therapeutic benefit. Pt asleep upon arrival, restraints in place, arousable for therapy. Pt required mod Ax2 for bed mobility, briefly sat on EOB, CGA for sitting balance. Pt engaged in UE AAROM in shoulder/elbow planes to maintain joint mobility however pt resistive throughout. Overall limited by cognition, aphasia, agitation and impaired mobility/ (I) with ADLs. Plans to return to LTC at d/c. Decreasing freq to 2x/week.     Time Calculation:    Time Calculation- OT     Row Name 11/04/22 1419             Time Calculation- OT    OT Start Time 1327  -MW      OT Stop Time 1339  -MW      OT Time Calculation (min) 12 min  -MW      Total Timed Code Minutes- OT 12 minute(s)  -MW      OT Received On 11/04/22  -MW      OT - Next Appointment 11/07/22  -MW         Timed Charges    33985 - OT Therapeutic Activity Minutes 12  -MW         Total Minutes    Timed Charges Total Minutes 12  -MW       Total Minutes 12  -MW            User Key  (r) = Recorded By, (t) = Taken By, (c) = Cosigned By    Initials Name Provider Type     Yudi Banegas OT Occupational Therapist              Therapy Charges for Today     Code Description Service Date Service Provider Modifiers Qty    [id number]  OT THERAPEUTIC ACT EA 15 MIN 11/4/2022 Yudi Banegas OT GO 1               Yudi Banegas OT  11/4/2022

## 2022-11-04 NOTE — PROGRESS NOTES
"Daily progress note    11/04/22      Primary care physician      Chief complaint  Doing same and remain agitated combative but no respiratory distress.      History of present illness  77-year-old white female with history of diabetes hypertension hyperlipidemia also has had CVA and currently nursing home for last 3 years with severe dysarthria brought to the emergency room with multiple lesions noted on her hands and feet in the left foot has blisters and bullae.  Patient has no fever chills or itching.  Patient has been treated with oral steroids and local wound care without any improvement.  Patient evaluated in ER found to have pemphigus vulgaris started on IV steroids admit for management.     REVIEW OF SYSTEMS  Unobtainable   .   PHYSICAL EXAM   Blood pressure 176/78, pulse 57, temperature 97.9 °F (36.6 °C), resp. rate 18, height 165.1 cm (65\"), weight 76.2 kg (167 lb 15.9 oz), SpO2 97 %.    Constitutional: Awake and alert.Communication is  limited due to her underlying aphasia.    HEENT: Normocephalic and atraumatic.   Neck: Normal range of motion. Supple. No JVD present.   Cardiovascular: Normal rate, regular rhythm and normal heart sounds.  Pulmonary/Chest: Effort normal and breath sounds normal.   Abdominal: Soft. Bowel sounds are normal. No distension. There is no tenderness. There is no rebound and no guarding.   Musculoskeletal:  No edema or deformity.   Neurological: Pt. is awake and alert and appears to be at her neurologic baseline p  Skin: She has scattered excoriations over the forearms and lower legs bilaterally.  Over her hands, she is has several areas of bullae/blisters that have resolved.  However, over her left foot she has several areas of tense bullae-especially on the plantar surface with some surrounding erythema.  This area does appear to be very tender.  Psychiatric: Unable to assess.       LAB RESULTS  Lab Results (last 24 hours)     Procedure Component Value Units Date/Time    " Tissue Pathology Exam [[id number]] Collected: 10/18/22 1644    Specimen: Tissue from Foot, Left Updated: 11/04/22 1504     Case Report --     Surgical Pathology Report                         Case: [id number]                                  Authorizing Provider:  Mustapha Mahan MD           Collected:           10/18/2022 04:44 PM          Ordering Location:     [facility]  Received:            10/20/2022 10:46 AM                                 [address]                                                                       Pathologist:           Regina Brooks MD                                                          Specimen:    Foot, Left, left foot blister                                                               Final Diagnosis --     CONSULTANT DIAGNOSIS:    1. Skin, Left Foot, Biopsy:     A. Pauci inflammatory subepidermal bullae (see comment).    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  Correlation with the concurrently submitted immunofluorescence studies is essential in the further classification of this subepidermal bullae.     The above interpretation was rendered by Dr. Ricardo Harman at [facility].  Please refer to outside pathology consultation report ([id number]) for additional details regarding this case.     JAB/clfrancesco       Preliminary Diagnosis --     1. Skin, Foot, Biopsy:    A. Paucicellular blister formation (see comment).     jab/mohit        Comment --     Given the patient's past medical history as well as concurrent biopsy sent to [facility] for direct immunofluorescence, this case will be forwarded to [facility] for expert consultation with a final report to follow.         Gross Description --     1. Foot, Left.   Received in formalin  labeled with the patient's name and designated 'left foot blister' is an irregular tan to grey white indurated soft tissue fragment measuring 0.4 x 0.3 x 0.1 cm. The tissue is submitted as received in 1A.    mb/o/mirlande/beverly      POC Glucose Once [[id number]]  (Abnormal) Collected: 11/04/22 1059    Specimen: Blood Updated: 11/04/22 1101     Glucose 148 mg/dL      Comment: Meter: [id number] : [id number] Leodan EDDY       POC Glucose Once [[id number]]  (Abnormal) Collected: 11/04/22 0551    Specimen: Blood Updated: 11/04/22 0553     Glucose 149 mg/dL      Comment: Meter: [id number] : [id number] Reynoso Rafaela NA       POC Glucose Once [[id number]]  (Abnormal) Collected: 11/03/22 2108    Specimen: Blood Updated: 11/03/22 2109     Glucose 210 mg/dL      Comment: Meter: [id number] : [id number] Reynoso Rafaela NA       POC Glucose Once [[id number]]  (Abnormal) Collected: 11/03/22 1612    Specimen: Blood Updated: 11/03/22 1616     Glucose 204 mg/dL      Comment: Meter: [id number] : [id number] Rosmery EDDY           Imaging Results (Last 24 Hours)     ** No results found for the last 24 hours. **          Current Facility-Administered Medications:   •  atorvastatin (LIPITOR) tablet 10 mg, 10 mg, Oral, Nightly, Mustapha Mahan MD, 10 mg at 11/03/22 2042  •  brimonidine (ALPHAGAN) 0.2 % ophthalmic solution 1 drop, 1 drop, Both Eyes, BID, Mustapha Mahan MD, 1 drop at 11/03/22 2043  •  clonazePAM (KlonoPIN) tablet 0.5 mg, 0.5 mg, Oral, TID PRN, Cooper Simpson MD, 0.5 mg at 11/02/22 2340  •  Divalproex Sodium (DEPAKOTE SPRINKLE) capsule 250 mg, 250 mg, Oral, TID, Jaylen Heaton III, MD, 250 mg at 11/04/22 0910  •  dorzolamide (TRUSOPT) 2 % ophthalmic solution 1 drop, 1 drop, Both Eyes, BID, Mustapha Mahan MD, 1 drop at 11/03/22 2043  •  Enoxaparin Sodium (LOVENOX) syringe 40 mg, 40 mg, Subcutaneous, Q24H, Cooper Simpson MD, 40 mg at 11/03/22 2043  •  insulin lispro (ADMELOG) injection 0-7 Units, 0-7  Units, Subcutaneous, 4x Daily With Meals & Nightly, Mustapha Mahan MD, 3 Units at 11/03/22 2132  •  losartan (COZAAR) tablet 100 mg, 100 mg, Oral, Q24H, Mustapha Mahan MD, 100 mg at 11/04/22 0910  •  miconazole (MICOTIN) 2 % cream 1 application, 1 application, Topical, BID, Sumit Lawson MD, 1 application at 11/03/22 2104  •  mupirocin (BACTROBAN) 2 % ointment 1 application, 1 application, Topical, Q12H, Sumit Lawson MD, 1 application at 11/03/22 2043  •  nebivolol (BYSTOLIC) tablet 5 mg, 5 mg, Oral, Q24H, Mustapha Mahan MD, 5 mg at 11/04/22 0910  •  OLANZapine (zyPREXA) injection 5 mg, 5 mg, Intramuscular, Q8H PRN, Cooper Simpson MD, 5 mg at 10/29/22 1611  •  OLANZapine (zyPREXA) tablet 7.5 mg, 7.5 mg, Oral, BID, Jaylen Heaton III, MD  •  pantoprazole (PROTONIX) EC tablet 40 mg, 40 mg, Oral, Q AM, Mustapha Mahan MD, 40 mg at 11/04/22 0552  •  PARoxetine (PAXIL) tablet 10 mg, 10 mg, Oral, Daily, Mustapha Mahan MD, 10 mg at 11/04/22 0910  •  predniSONE (DELTASONE) tablet 10 mg, 10 mg, Oral, Daily With Breakfast, Mustapha Mahan MD, 10 mg at 11/04/22 0910  •  [COMPLETED] Insert peripheral IV, , , Once **AND** sodium chloride 0.9 % flush 10 mL, 10 mL, Intravenous, PRN, Viktor Harrington MD, 10 mL at 10/19/22 0854     ASSESSMENT  Pemphigus vulgaris  E. coli UTI  Insulin-dependent diabetes mellitus   Hypertension  Hyperlipidemia  History of CVA  Dysarthria  Severe dementia  Gastroesophageal reflux disease    PLAN  CPM  Antibiotics completed  Continue prednisone  Adjust insulin dose  Appreciate psychiatric consultation  Continue local mupirocin.  Continue stress ulcer prophylaxis  Continue DVT prophylaxis with SCDs.  Continue to work with PT/OT  DNR but patient has been not ready for comfort care at this time.  Discharge to [facility] once off restraints and bed available    Mustapha Mahan MD  11/04/22

## 2022-11-05 LAB
ANION GAP SERPL CALCULATED.3IONS-SCNC: 8.3 MMOL/L (ref 5–15)
BASOPHILS # BLD AUTO: 0.02 10*3/MM3 (ref 0–0.2)
BASOPHILS NFR BLD AUTO: 0.3 % (ref 0–1.5)
BUN SERPL-MCNC: 20 MG/DL (ref 8–23)
BUN/CREAT SERPL: 25.3 (ref 7–25)
CALCIUM SPEC-SCNC: 9 MG/DL (ref 8.6–10.5)
CHLORIDE SERPL-SCNC: 106 MMOL/L (ref 98–107)
CO2 SERPL-SCNC: 27.7 MMOL/L (ref 22–29)
CREAT SERPL-MCNC: 0.79 MG/DL (ref 0.57–1)
DEPRECATED RDW RBC AUTO: 40.8 FL (ref 37–54)
EGFRCR SERPLBLD CKD-EPI 2021: 76.7 ML/MIN/1.73
EOSINOPHIL # BLD AUTO: 0.02 10*3/MM3 (ref 0–0.4)
EOSINOPHIL NFR BLD AUTO: 0.3 % (ref 0.3–6.2)
ERYTHROCYTE [DISTWIDTH] IN BLOOD BY AUTOMATED COUNT: 12.8 % (ref 12.3–15.4)
GLUCOSE BLDC GLUCOMTR-MCNC: 105 MG/DL (ref 70–130)
GLUCOSE BLDC GLUCOMTR-MCNC: 106 MG/DL (ref 70–130)
GLUCOSE BLDC GLUCOMTR-MCNC: 228 MG/DL (ref 70–130)
GLUCOSE BLDC GLUCOMTR-MCNC: 305 MG/DL (ref 70–130)
GLUCOSE SERPL-MCNC: 140 MG/DL (ref 65–99)
HCT VFR BLD AUTO: 41.4 % (ref 34–46.6)
HGB BLD-MCNC: 14.1 G/DL (ref 12–15.9)
IMM GRANULOCYTES # BLD AUTO: 0.02 10*3/MM3 (ref 0–0.05)
IMM GRANULOCYTES NFR BLD AUTO: 0.3 % (ref 0–0.5)
LYMPHOCYTES # BLD AUTO: 1.28 10*3/MM3 (ref 0.7–3.1)
LYMPHOCYTES NFR BLD AUTO: 21.7 % (ref 19.6–45.3)
MCH RBC QN AUTO: 29.6 PG (ref 26.6–33)
MCHC RBC AUTO-ENTMCNC: 34.1 G/DL (ref 31.5–35.7)
MCV RBC AUTO: 86.8 FL (ref 79–97)
MONOCYTES # BLD AUTO: 0.4 10*3/MM3 (ref 0.1–0.9)
MONOCYTES NFR BLD AUTO: 6.8 % (ref 5–12)
NEUTROPHILS NFR BLD AUTO: 4.15 10*3/MM3 (ref 1.7–7)
NEUTROPHILS NFR BLD AUTO: 70.6 % (ref 42.7–76)
NRBC BLD AUTO-RTO: 0 /100 WBC (ref 0–0.2)
PLATELET # BLD AUTO: 193 10*3/MM3 (ref 140–450)
PMV BLD AUTO: 9.9 FL (ref 6–12)
POTASSIUM SERPL-SCNC: 3.5 MMOL/L (ref 3.5–5.2)
RBC # BLD AUTO: 4.77 10*6/MM3 (ref 3.77–5.28)
SODIUM SERPL-SCNC: 142 MMOL/L (ref 136–145)
WBC NRBC COR # BLD: 5.89 10*3/MM3 (ref 3.4–10.8)

## 2022-11-05 PROCEDURE — 63710000001 INSULIN LISPRO (HUMAN) PER 5 UNITS: Performed by: HOSPITALIST

## 2022-11-05 PROCEDURE — 63710000001 PREDNISONE PER 5 MG: Performed by: HOSPITALIST

## 2022-11-05 PROCEDURE — 85025 COMPLETE CBC W/AUTO DIFF WBC: CPT | Performed by: HOSPITALIST

## 2022-11-05 PROCEDURE — 82962 GLUCOSE BLOOD TEST: CPT

## 2022-11-05 PROCEDURE — 80048 BASIC METABOLIC PNL TOTAL CA: CPT | Performed by: HOSPITALIST

## 2022-11-05 PROCEDURE — 25010000002 ENOXAPARIN PER 10 MG: Performed by: INTERNAL MEDICINE

## 2022-11-05 RX ADMIN — MICONAZOLE NITRATE 1 APPLICATION: 20 CREAM TOPICAL at 21:19

## 2022-11-05 RX ADMIN — PREDNISONE 10 MG: 10 TABLET ORAL at 10:41

## 2022-11-05 RX ADMIN — DORZOLAMIDE HYDROCHLORIDE 1 DROP: 20 SOLUTION/ DROPS OPHTHALMIC at 21:17

## 2022-11-05 RX ADMIN — INSULIN LISPRO 3 UNITS: 100 INJECTION, SOLUTION INTRAVENOUS; SUBCUTANEOUS at 21:57

## 2022-11-05 RX ADMIN — ENOXAPARIN SODIUM 40 MG: 100 INJECTION SUBCUTANEOUS at 21:16

## 2022-11-05 RX ADMIN — DIVALPROEX SODIUM 250 MG: 125 CAPSULE, COATED PELLETS ORAL at 17:17

## 2022-11-05 RX ADMIN — DORZOLAMIDE HYDROCHLORIDE 1 DROP: 20 SOLUTION/ DROPS OPHTHALMIC at 10:42

## 2022-11-05 RX ADMIN — PAROXETINE HYDROCHLORIDE 10 MG: 10 TABLET, FILM COATED ORAL at 13:19

## 2022-11-05 RX ADMIN — DIVALPROEX SODIUM 250 MG: 125 CAPSULE, COATED PELLETS ORAL at 10:40

## 2022-11-05 RX ADMIN — DIVALPROEX SODIUM 250 MG: 125 CAPSULE, COATED PELLETS ORAL at 21:18

## 2022-11-05 RX ADMIN — LOSARTAN POTASSIUM 100 MG: 100 TABLET, FILM COATED ORAL at 10:40

## 2022-11-05 RX ADMIN — ATORVASTATIN CALCIUM 10 MG: 20 TABLET, FILM COATED ORAL at 21:17

## 2022-11-05 RX ADMIN — OLANZAPINE 7.5 MG: 7.5 TABLET ORAL at 21:17

## 2022-11-05 RX ADMIN — BRIMONIDINE TARTRATE 1 DROP: 2 SOLUTION OPHTHALMIC at 10:40

## 2022-11-05 RX ADMIN — INSULIN LISPRO 5 UNITS: 100 INJECTION, SOLUTION INTRAVENOUS; SUBCUTANEOUS at 18:47

## 2022-11-05 RX ADMIN — BRIMONIDINE TARTRATE 1 DROP: 2 SOLUTION OPHTHALMIC at 21:17

## 2022-11-05 RX ADMIN — PANTOPRAZOLE SODIUM 40 MG: 40 TABLET, DELAYED RELEASE ORAL at 10:48

## 2022-11-05 RX ADMIN — MUPIROCIN 1 APPLICATION: 20 OINTMENT TOPICAL at 21:19

## 2022-11-05 RX ADMIN — INSULIN GLARGINE-YFGN 10 UNITS: 100 INJECTION, SOLUTION SUBCUTANEOUS at 21:57

## 2022-11-05 RX ADMIN — MICONAZOLE NITRATE 1 APPLICATION: 20 CREAM TOPICAL at 10:42

## 2022-11-05 RX ADMIN — OLANZAPINE 7.5 MG: 7.5 TABLET ORAL at 10:40

## 2022-11-05 RX ADMIN — NEBIVOLOL 2.5 MG: 5 TABLET ORAL at 10:40

## 2022-11-05 NOTE — PROGRESS NOTES
"Daily progress note    11/05/22      Primary care physician      Chief complaint  Doing little better with no agitation at the time of examination.    History of present illness  77-year-old white female with history of diabetes hypertension hyperlipidemia also has had CVA and currently nursing home for last 3 years with severe dysarthria brought to the emergency room with multiple lesions noted on her hands and feet in the left foot has blisters and bullae.  Patient has no fever chills or itching.  Patient has been treated with oral steroids and local wound care without any improvement.  Patient evaluated in ER found to have pemphigus vulgaris started on IV steroids admit for management.     REVIEW OF SYSTEMS  Unobtainable   .   PHYSICAL EXAM   Blood pressure (!) 197/84, pulse 56, temperature 97.3 °F (36.3 °C), temperature source Oral, resp. rate 16, height 165.1 cm (65\"), weight 76.2 kg (167 lb 15.9 oz), SpO2 92 %.    Constitutional: Awake and alert.Communication is  limited due to her underlying aphasia.    HEENT: Normocephalic and atraumatic.   Neck: Normal range of motion. Supple. No JVD present.   Cardiovascular: Normal rate, regular rhythm and normal heart sounds.  Pulmonary/Chest: Effort normal and breath sounds normal.   Abdominal: Soft. Bowel sounds are normal. No distension. There is no tenderness. There is no rebound and no guarding.   Musculoskeletal:  No edema or deformity.   Neurological: Pt. is awake and alert and appears to be at her neurologic baseline p  Skin: She has scattered excoriations over the forearms and lower legs bilaterally.  Over her hands, she is has several areas of bullae/blisters that have resolved.  However, over her left foot she has several areas of tense bullae-especially on the plantar surface with some surrounding erythema.  This area does appear to be very tender.  Psychiatric: Unable to assess.       LAB RESULTS  Lab Results (last 24 hours)     Procedure Component " Value Units Date/Time    POC Glucose Once [[id number]]  (Normal) Collected: 11/05/22 1121    Specimen: Blood Updated: 11/05/22 1123     Glucose 105 mg/dL      Comment: Meter: [id number] : [id number] Juan Pablo Desiree EDDY       POC Glucose Once [[id number]]  (Normal) Collected: 11/05/22 0612    Specimen: Blood Updated: 11/05/22 0613     Glucose 106 mg/dL      Comment: Meter: [id number] : [id number] Chris EDDY       Basic Metabolic Panel [[id number]]  (Abnormal) Collected: 11/05/22 0446    Specimen: Blood Updated: 11/05/22 0546     Glucose 140 mg/dL      BUN 20 mg/dL      Creatinine 0.79 mg/dL      Sodium 142 mmol/L      Potassium 3.5 mmol/L      Chloride 106 mmol/L      CO2 27.7 mmol/L      Calcium 9.0 mg/dL      BUN/Creatinine Ratio 25.3     Anion Gap 8.3 mmol/L      eGFR 76.7 mL/min/1.73      Comment: National Kidney Foundation and American Society of Nephrology (ASN) Task Force recommended calculation based on the Chronic Kidney Disease Epidemiology Collaboration (CKD-EPI) equation refit without adjustment for race.       Narrative:      GFR Normal >60  Chronic Kidney Disease <60  Kidney Failure <15    The GFR formula is only valid for adults with stable renal function between ages 18 and 70.    CBC & Differential [[id number]]  (Normal) Collected: 11/05/22 0446    Specimen: Blood Updated: 11/05/22 0543    Narrative:      The following orders were created for panel order CBC & Differential.  Procedure                               Abnormality         Status                     ---------                               -----------         ------                     CBC Auto Differential[[id number]]        Normal              Final result                 Please view results for these tests on the individual orders.    CBC Auto Differential [[id number]]  (Normal) Collected: 11/05/22 0446    Specimen: Blood Updated: 11/05/22 0543     WBC 5.89 10*3/mm3      RBC 4.77 10*6/mm3      Hemoglobin 14.1 g/dL       Hematocrit 41.4 %      MCV 86.8 fL      MCH 29.6 pg      MCHC 34.1 g/dL      RDW 12.8 %      RDW-SD 40.8 fl      MPV 9.9 fL      Platelets 193 10*3/mm3      Neutrophil % 70.6 %      Lymphocyte % 21.7 %      Monocyte % 6.8 %      Eosinophil % 0.3 %      Basophil % 0.3 %      Immature Grans % 0.3 %      Neutrophils, Absolute 4.15 10*3/mm3      Lymphocytes, Absolute 1.28 10*3/mm3      Monocytes, Absolute 0.40 10*3/mm3      Eosinophils, Absolute 0.02 10*3/mm3      Basophils, Absolute 0.02 10*3/mm3      Immature Grans, Absolute 0.02 10*3/mm3      nRBC 0.0 /100 WBC     POC Glucose Once [[id number]]  (Abnormal) Collected: 11/04/22 2320    Specimen: Blood Updated: 11/04/22 2322     Glucose 150 mg/dL      Comment: Meter: [id number] : [id number] Chris EDDY       POC Glucose Once [[id number]]  (Abnormal) Collected: 11/04/22 1611    Specimen: Blood Updated: 11/04/22 1613     Glucose 179 mg/dL      Comment: Meter: [id number] : [id number] Rosmery EDDY       Tissue Pathology Exam [[id number]] Collected: 10/18/22 1644    Specimen: Tissue from Foot, Left Updated: 11/04/22 1504     Case Report --     Surgical Pathology Report                         Case: [id number]                                  Authorizing Provider:  Mustapha Mahan MD           Collected:           10/18/2022 04:44 PM          Ordering Location:     [facility]  Received:            10/20/2022 10:46 AM                                 [address]                                                                       Pathologist:           Regina Brooks MD                                                          Specimen:    Foot, Left, left foot blister                                                               Final Diagnosis --     CONSULTANT DIAGNOSIS:    1. Skin, Left Foot, Biopsy:     A. Pauci inflammatory subepidermal bullae (see comment).    COMMENT:    The findings are suggestive of an autoimmune bullous diease and the  differential diagnosis include pauci inflammatory bullous pemphigoid, epidermolysis bullosa acquisita, bullous drug associated eruption and, less likely, porphyria cutanea tarda.  Correlation with the concurrently submitted immunofluorescence studies is essential in the further classification of this subepidermal bullae.     The above interpretation was rendered by Dr. Ricardo Harman at [facility].  Please refer to outside pathology consultation report ([id number]) for additional details regarding this case.     JAB/clm       Preliminary Diagnosis --     1. Skin, Foot, Biopsy:    A. Paucicellular blister formation (see comment).     jab/jse        Comment --     Given the patient's past medical history as well as concurrent biopsy sent to [facility] for direct immunofluorescence, this case will be forwarded to [facility] for expert consultation with a final report to follow.         Gross Description --     1. Foot, Left.   Received in formalin labeled with the patient's name and designated 'left foot blister' is an irregular tan to grey white indurated soft tissue fragment measuring 0.4 x 0.3 x 0.1 cm. The tissue is submitted as received in 1A.    mb/uso/merlyt/monaem          Imaging Results (Last 24 Hours)     ** No results found for the last 24 hours. **          Current Facility-Administered Medications:   •  atorvastatin (LIPITOR) tablet 10 mg, 10 mg, Oral, Nightly, Mustapha Mahan MD, 10 mg at 11/03/22 2042  •  brimonidine (ALPHAGAN) 0.2 % ophthalmic solution 1 drop, 1 drop, Both Eyes, BID, Mustapha Mahan MD, 1 drop at 11/05/22 1040  •  clonazePAM (KlonoPIN) tablet 0.5 mg, 0.5 mg, Oral, TID PRN, Cooper Simpson MD, 0.5 mg at 11/02/22 2340  •  Divalproex Sodium (DEPAKOTE SPRINKLE) capsule 250 mg, 250 mg, Oral, TID, Jaylen Heaton III, MD, 250 mg at 11/05/22 1040  •  dorzolamide (TRUSOPT) 2 % ophthalmic solution 1 drop, 1 drop, Both Eyes, BID, Mustapha Mahan MD, 1 drop at 11/05/22  1042  •  Enoxaparin Sodium (LOVENOX) syringe 40 mg, 40 mg, Subcutaneous, Q24H, Cooper Simpson MD, 40 mg at 11/04/22 2100  •  insulin glargine (LANTUS, SEMGLEE) injection 10 Units, 10 Units, Subcutaneous, Nightly, Mustapha Mahan MD, 10 Units at 11/04/22 2328  •  insulin lispro (ADMELOG) injection 0-7 Units, 0-7 Units, Subcutaneous, 4x Daily With Meals & Nightly, Mustapha Mahan MD, 3 Units at 11/03/22 2132  •  losartan (COZAAR) tablet 100 mg, 100 mg, Oral, Q24H, Mustapha Mahan MD, 100 mg at 11/05/22 1040  •  miconazole (MICOTIN) 2 % cream 1 application, 1 application, Topical, BID, Sumit Lawson MD, 1 application at 11/05/22 1042  •  mupirocin (BACTROBAN) 2 % ointment 1 application, 1 application, Topical, Q12H, Sumit Lawson MD, 1 application at 11/04/22 2102  •  nebivolol (BYSTOLIC) tablet 2.5 mg, 2.5 mg, Oral, Q24H, Mustapha Mahan MD, 2.5 mg at 11/05/22 1040  •  OLANZapine (zyPREXA) injection 5 mg, 5 mg, Intramuscular, Q8H PRN, Cooper Simpson MD, 5 mg at 10/29/22 1611  •  OLANZapine (zyPREXA) tablet 7.5 mg, 7.5 mg, Oral, BID, Jaylen Heaton III, MD, 7.5 mg at 11/05/22 1040  •  pantoprazole (PROTONIX) EC tablet 40 mg, 40 mg, Oral, Q AM, Mustapha Mahan MD, 40 mg at 11/05/22 1048  •  PARoxetine (PAXIL) tablet 10 mg, 10 mg, Oral, Daily, Mustapha Mahan MD, 10 mg at 11/04/22 0910  •  predniSONE (DELTASONE) tablet 10 mg, 10 mg, Oral, Daily With Breakfast, Mustapha Mahan MD, 10 mg at 11/05/22 1041  •  [COMPLETED] Insert peripheral IV, , , Once **AND** sodium chloride 0.9 % flush 10 mL, 10 mL, Intravenous, PRN, Viktor Harrington MD, 10 mL at 10/19/22 0854     ASSESSMENT  Pemphigus vulgaris  E. coli UTI  Insulin-dependent diabetes mellitus   Hypertension  Hyperlipidemia  History of CVA  Dysarthria  Severe dementia  Gastroesophageal reflux disease    PLAN  CPM  Discontinue restraints  Antibiotics completed  Continue prednisone  Adjust insulin dose  Appreciate psychiatric consultation  Continue  local mupirocin.  Continue stress ulcer prophylaxis  Continue DVT prophylaxis with SCDs.  Continue to work with PT/OT  DNR but patient has been not ready for comfort care at this time.  Discharge to [facility] psych once off restraints and bed available    Mustapha Mahan MD  11/05/22

## 2022-11-05 NOTE — PLAN OF CARE
Goal Outcome Evaluation:  Plan of Care Reviewed With: patient        Progress: no change  Outcome Evaluation: isolation, complete care, drank some water, kamala posey wrists and vest restraints on, refused 2100 meds-spit them out, pleasant/cooperative at times, at other times yells and fights, incont b/b, left foot drsg intact

## 2022-11-05 NOTE — PLAN OF CARE
Goal Outcome Evaluation:  VSS, dressing to left foot changed per orders late in shift, small amount dried drainage on old dressing, eating much better today, able to feed self after tray set-up, alert, oriented to self only, calm & cooperative most of the day, able to discontinue both posey vest & wrist restraints, became a little agitated when  visited but calmed with RN talking to her, falls protocol maintained, bed alarm in use,   Plan of Care Reviewed With: patient, spouse

## 2022-11-06 VITALS
SYSTOLIC BLOOD PRESSURE: 137 MMHG | DIASTOLIC BLOOD PRESSURE: 75 MMHG | OXYGEN SATURATION: 93 % | HEART RATE: 63 BPM | WEIGHT: 167.99 LBS | HEIGHT: 65 IN | TEMPERATURE: 96.9 F | BODY MASS INDEX: 27.99 KG/M2 | RESPIRATION RATE: 16 BRPM

## 2022-11-06 LAB
GLUCOSE BLDC GLUCOMTR-MCNC: 208 MG/DL (ref 70–130)
GLUCOSE BLDC GLUCOMTR-MCNC: 236 MG/DL (ref 70–130)
GLUCOSE BLDC GLUCOMTR-MCNC: 72 MG/DL (ref 70–130)

## 2022-11-06 PROCEDURE — 63710000001 PREDNISONE PER 5 MG: Performed by: HOSPITALIST

## 2022-11-06 PROCEDURE — 82962 GLUCOSE BLOOD TEST: CPT

## 2022-11-06 PROCEDURE — 63710000001 INSULIN LISPRO (HUMAN) PER 5 UNITS: Performed by: HOSPITALIST

## 2022-11-06 RX ORDER — PREDNISONE 1 MG/1
5 TABLET ORAL
Qty: 5 TABLET | Refills: 0 | Status: SHIPPED | OUTPATIENT
Start: 2022-11-07 | End: 2022-11-12

## 2022-11-06 RX ORDER — OLANZAPINE 7.5 MG/1
7.5 TABLET ORAL 2 TIMES DAILY
Qty: 60 TABLET | Refills: 0 | Status: SHIPPED | OUTPATIENT
Start: 2022-11-06 | End: 2022-12-06

## 2022-11-06 RX ORDER — NEBIVOLOL 2.5 MG/1
2.5 TABLET ORAL
Qty: 30 TABLET | Refills: 0 | Status: SHIPPED | OUTPATIENT
Start: 2022-11-07 | End: 2022-12-07

## 2022-11-06 RX ORDER — ATORVASTATIN CALCIUM 10 MG/1
10 TABLET, FILM COATED ORAL NIGHTLY
Qty: 30 TABLET | Refills: 0 | Status: SHIPPED | OUTPATIENT
Start: 2022-11-06 | End: 2022-12-06

## 2022-11-06 RX ORDER — PREDNISONE 10 MG/1
5 TABLET ORAL
Status: DISCONTINUED | OUTPATIENT
Start: 2022-11-07 | End: 2022-11-06 | Stop reason: HOSPADM

## 2022-11-06 RX ADMIN — INSULIN LISPRO 3 UNITS: 100 INJECTION, SOLUTION INTRAVENOUS; SUBCUTANEOUS at 10:09

## 2022-11-06 RX ADMIN — MICONAZOLE NITRATE 1 APPLICATION: 20 CREAM TOPICAL at 10:33

## 2022-11-06 RX ADMIN — MUPIROCIN 1 APPLICATION: 20 OINTMENT TOPICAL at 10:10

## 2022-11-06 RX ADMIN — PANTOPRAZOLE SODIUM 40 MG: 40 TABLET, DELAYED RELEASE ORAL at 05:58

## 2022-11-06 RX ADMIN — DORZOLAMIDE HYDROCHLORIDE 1 DROP: 20 SOLUTION/ DROPS OPHTHALMIC at 10:09

## 2022-11-06 RX ADMIN — INSULIN LISPRO 3 UNITS: 100 INJECTION, SOLUTION INTRAVENOUS; SUBCUTANEOUS at 12:31

## 2022-11-06 NOTE — CASE MANAGEMENT/SOCIAL WORK
"Physicians Statement of Medical Necessity for  Ambulance Transportation    GENERAL INFORMATION     Name: Darby Workman  YOB: 1945  Medicare #: n/a, see insurance info on face sheet  Transport Date:  11/6/22  (Valid for round trips this date, or for scheduled repetitive trips for 60 days from the date signed below.)  Origin: [facility]  Destination: [facility]  Is the Patient's stay covered under Medicare Part A (PPS/DRG?)No   Closest appropriate facility? Yes  If this a hosp-hosp transfer? No  Is this a hospice patient? No    MEDICAL NECESSITY QUESTIONAIRE    Ambulance Transportation is medically necessary only if other means of transportation are contraindicated or would be potentially harmful to the patient.  To meet this requirement, the patient must be either \"bed confined\" or suffer from a condition such that transport by means other than an ambulance is contraindicated by the patient's condition.  The following questions must be answered by the healthcare professional signing below for this form to be valid:     1) Describe the MEDICAL CONDITION (physical and/or mental) of this patient AT THE TIME OF AMBULANCE TRANSPORT that requires the patient to be transported in an ambulance, and why transport by other means is contraindicated by the patient's condition: pt is confused  Past Medical History:   Diagnosis Date   • Acute cerebrovascular accident (CVA) of cerebellum (HCC)    • Acute ischemic stroke (HCC)    • Arthritis    • Coronary artery disease    • CVA (cerebral vascular accident) (HCC)    • Diabetes mellitus (HCC)    • Heart attack (HCC)    • History of blood clots    • Hyperlipidemia    • Hypertension    • Vision loss       Past Surgical History:   Procedure Laterality Date   • CAROTID ENDARTERECTOMY Left 7/17/2019    Procedure: Left carotid endarterectomy;  Surgeon: Rupert Oliva MD;  Location: [facility];  Service: Neurosurgery   • EMBOLECTOMY Left 7/17/2019    Procedure: " "CEREBRAL ANGIOGRAM, LEFT INTERNAL CAROTID ARTERY STENT;  Surgeon: Rupert Oliva MD;  Location: [facility] HYBRID OR 18/19;  Service: Neurosurgery   • ENDOSCOPY N/A 3/28/2020    Procedure: ESOPHAGOGASTRODUODENOSCOPY AT BEDSIDE WITH EPI INJECTION AND GOLD PROBE CAUTERIZATION;  Surgeon: Malathi Bright MD;  Location: [facility] ENDOSCOPY;  Service: Gastroenterology;  Laterality: N/A;  PRE GI BLEED  POST EROSIVE GASTRITIS, DUODENAL ULCER WITH CLOT      2) Is this patient \"bed confined\" as defined below?Yes   To be \"bed confined\" the patient must satisfy all three of the following criteria:  (1) unable to get up from bed without assistance; AND (2) unable to ambulate;  AND (3) unable to sit in a chair or wheelchair.  3) Can this patient safely be transported by car or wheelchair van (I.e., may safely sit during transport, without an attendant or monitoring?)No   4. In addition to completing questions 1-3 above, please check any of the following conditions that apply*:          *Note: supporting documentation for any boxes checked must be maintained in the patient's medical records Patient is confused, Need or possible need for restraints and Unable to sit in a chair or wheelchair due to decubitus ulcers or other wounds      SIGNATURE OF PHYSICIAN OR OTHER AUTHORIZED HEALTHCARE PROFESSIONAL    I certify that the above information is true and correct based on my evaluation of this patient, and represent that the patient requires transport by ambulance and that other forms of transport are contraindicated.  I understand that this information will be used by the Centers for Medicare and Medicaid Services (CMS) to support the determiniation of medical necessity for ambulance services, and I represent that I have personal knowledge of the patient's condition at the time of transport.     X   If this box is checked, I also certify that the patient is physically or mentally incapable of signing the ambulance service's claim form " and that the institution with which I am affiliated has furnished care, services or assistance to the patient.  My signature below is made on behalf of the patient pursuant to 42 .36(b)(4). In accordance with 42 .37, the specific reason(s) that the patient is physically or mentally incapable of signing the claim for is as follows: pt has been confused    Signature of Physician or Healthcare Professional  Date/Time:     11/6/22  1033     (For Scheduled repetitive transport, this form is not valid for transports performed more than 60 days after this date).                                                                                                                                            --------------------------------------------------------------------------------------------  Printed Name and Credentials of Physician or Authorized Healthcare Professional     *Form must be signed by patient's attending physician for scheduled, repetitive transports,.  For non-repetitive ambulance transports, if unable to obtain the signature of the attending physician, any of the following may sign (please select below):     Physician  Clinical Nurse Specialist  Registered Nurse     Physician Assistant  Discharge Planner  Licensed Practical Nurse     Nurse Practitioner

## 2022-11-06 NOTE — CASE MANAGEMENT/SOCIAL WORK
Continued Stay Note  [facility]     Patient Name: Darby Workman  MRN: [id number]  Today's Date: 11/6/2022    Admit Date: 10/18/2022    Plan: return to [facility] IC [facility] bed via [facility]   Discharge Plan     Row Name 11/06/22 1220       Plan    Plan return to [facility] level bed via [facility]    Patient/Family in Agreement with Plan yes  reviewed DC plan with spouse Ajit    Plan Comments Inbound call from 6P RN stating pt will likely DC today since she will be restraint-free for >24hrs at noon today. Previous note reviewed and plan is pt to return to LTC at [facility]. Called and updated Monica/[facility] and she states pt is OK to return to IC level bed at [facility]. Per staff RN, pt will need transport. Arranged for [facility] at 1730. Updated MD. Updated staff RN. Updated pt's spouse Ajit.............JW               Discharge Codes    No documentation.               Expected Discharge Date and Time     Expected Discharge Date Expected Discharge Time    Nov 6, 2022             Regina Barber, RN

## 2022-11-06 NOTE — CASE MANAGEMENT/SOCIAL WORK
"Physicians Statement of Medical Necessity for  Ambulance Transportation    GENERAL INFORMATION     Name: Darby Workman  YOB: 1945  Medicare #: n/a, see insurance info on face sheet  Transport Date:  11/6/22  (Valid for round trips this date, or for scheduled repetitive trips for 60 days from the date signed below.)  Origin: [facility]  Destination: [facility]  Is the Patient's stay covered under Medicare Part A (PPS/DRG?)No   Closest appropriate facility? Yes  If this a hosp-hosp transfer? No  Is this a hospice patient? No    MEDICAL NECESSITY QUESTIONAIRE    Ambulance Transportation is medically necessary only if other means of transportation are contraindicated or would be potentially harmful to the patient.  To meet this requirement, the patient must be either \"bed confined\" or suffer from a condition such that transport by means other than an ambulance is contraindicated by the patient's condition.  The following questions must be answered by the healthcare professional signing below for this form to be valid:     1) Describe the MEDICAL CONDITION (physical and/or mental) of this patient AT THE TIME OF AMBULANCE TRANSPORT that requires the patient to be transported in an ambulance, and why transport by other means is contraindicated by the patient's condition: pt is confused  Past Medical History:   Diagnosis Date   • Acute cerebrovascular accident (CVA) of cerebellum (HCC)    • Acute ischemic stroke (HCC)    • Arthritis    • Coronary artery disease    • CVA (cerebral vascular accident) (HCC)    • Diabetes mellitus (HCC)    • Heart attack (HCC)    • History of blood clots    • Hyperlipidemia    • Hypertension    • Vision loss       Past Surgical History:   Procedure Laterality Date   • CAROTID ENDARTERECTOMY Left 7/17/2019    Procedure: Left carotid endarterectomy;  Surgeon: Rupert Oliva MD;  Location: [facility];  Service: Neurosurgery   • EMBOLECTOMY Left 7/17/2019    Procedure: " "CEREBRAL ANGIOGRAM, LEFT INTERNAL CAROTID ARTERY STENT;  Surgeon: Rupert Oliva MD;  Location: [facility] HYBRID OR 18/19;  Service: Neurosurgery   • ENDOSCOPY N/A 3/28/2020    Procedure: ESOPHAGOGASTRODUODENOSCOPY AT BEDSIDE WITH EPI INJECTION AND GOLD PROBE CAUTERIZATION;  Surgeon: Malathi Bright MD;  Location: [facility] ENDOSCOPY;  Service: Gastroenterology;  Laterality: N/A;  PRE GI BLEED  POST EROSIVE GASTRITIS, DUODENAL ULCER WITH CLOT      2) Is this patient \"bed confined\" as defined below?Yes   To be \"bed confined\" the patient must satisfy all three of the following criteria:  (1) unable to get up from bed without assistance; AND (2) unable to ambulate;  AND (3) unable to sit in a chair or wheelchair.  3) Can this patient safely be transported by car or wheelchair van (I.e., may safely sit during transport, without an attendant or monitoring?)No   4. In addition to completing questions 1-3 above, please check any of the following conditions that apply*:          *Note: supporting documentation for any boxes checked must be maintained in the patient's medical records Patient is confused, Need or possible need for restraints and Unable to sit in a chair or wheelchair due to decubitus ulcers or other wounds      SIGNATURE OF PHYSICIAN OR OTHER AUTHORIZED HEALTHCARE PROFESSIONAL    I certify that the above information is true and correct based on my evaluation of this patient, and represent that the patient requires transport by ambulance and that other forms of transport are contraindicated.  I understand that this information will be used by the Centers for Medicare and Medicaid Services (CMS) to support the determiniation of medical necessity for ambulance services, and I represent that I have personal knowledge of the patient's condition at the time of transport.     X   If this box is checked, I also certify that the patient is physically or mentally incapable of signing the ambulance service's claim form " and that the institution with which I am affiliated has furnished care, services or assistance to the patient.  My signature below is made on behalf of the patient pursuant to 42 .36(b)(4). In accordance with 42 .37, the specific reason(s) that the patient is physically or mentally incapable of signing the claim for is as follows: pt has been confused    Signature of Physician or Healthcare Professional  Date/Time:     11/6/22  8491     (For Scheduled repetitive transport, this form is not valid for transports performed more than 60 days after this date).                                                                                                                                            --------------------------------------------------------------------------------------------  Printed Name and Credentials of Physician or Authorized Healthcare Professional     *Form must be signed by patient's attending physician for scheduled, repetitive transports,.  For non-repetitive ambulance transports, if unable to obtain the signature of the attending physician, any of the following may sign (please select below):     Physician  Clinical Nurse Specialist  Registered Nurse     Physician Assistant  Discharge Planner  Licensed Practical Nurse     Nurse Practitioner

## 2022-11-06 NOTE — PLAN OF CARE
Goal Outcome Evaluation:  VSS, sliding scale coverage for elevated blood glucose, very sleepy today, refusing to eat or take meds, closes mouth tightly, shakes her finger, & shakes her head no, inc of urine, dressing to left foot changed, slept through dressing change, falls protocol maintained, bed alarm in use, d/c to [facility], ambulance to transport patient there at 1730  Patient has not eaten all day, still refuses to eat or drink, blood glucose at 72, has not voided all shift, bladder scanned 476cc on scan, Dr Mahan notified, order to straight cath, I & O cath performed 525 dark cloudy urine with sediment obtained, OK'd to continue with D/C per Dr Mahan  Plan of Care Reviewed With: patient

## 2022-11-06 NOTE — PROGRESS NOTES
"Daily progress note    11/06/22      Primary care physician      Chief complaint  Doing same with no new complaints but off restraints and comfortably lying in bed with no distress and follow commands.    History of present illness  77-year-old white female with history of diabetes hypertension hyperlipidemia also has had CVA and currently nursing home for last 3 years with severe dysarthria brought to the emergency room with multiple lesions noted on her hands and feet in the left foot has blisters and bullae.  Patient has no fever chills or itching.  Patient has been treated with oral steroids and local wound care without any improvement.  Patient evaluated in ER found to have pemphigus vulgaris started on IV steroids admit for management.     REVIEW OF SYSTEMS  Unobtainable   .   PHYSICAL EXAM   Blood pressure 137/75, pulse 63, temperature 96.9 °F (36.1 °C), temperature source Axillary, resp. rate 16, height 165.1 cm (65\"), weight 76.2 kg (167 lb 15.9 oz), SpO2 93 %.    Constitutional: Awake and alert.Communication is  limited due to her underlying aphasia.    HEENT: Normocephalic and atraumatic.   Neck: Normal range of motion. Supple. No JVD present.   Cardiovascular: Normal rate, regular rhythm and normal heart sounds.  Pulmonary/Chest: Effort normal and breath sounds normal.   Abdominal: Soft. Bowel sounds are normal. No distension. There is no tenderness. There is no rebound and no guarding.   Musculoskeletal:  No edema or deformity.   Neurological: Pt. is awake and alert and appears to be at her neurologic baseline p  Skin: She has scattered excoriations over the forearms and lower legs bilaterally.  Over her hands, she is has several areas of bullae/blisters that have resolved.  However, over her left foot she has several areas of tense bullae-especially on the plantar surface with some surrounding erythema.  This area does appear to be very tender.  Psychiatric: Unable to assess.       LAB " RESULTS  Lab Results (last 24 hours)     Procedure Component Value Units Date/Time    POC Glucose Once [[id number]]  (Abnormal) Collected: 11/06/22 1145    Specimen: Blood Updated: 11/06/22 1147     Glucose 208 mg/dL      Comment: Meter: [id number] : [id number] Juan Pablo Desiree NA       POC Glucose Once [[id number]]  (Abnormal) Collected: 11/06/22 0618    Specimen: Blood Updated: 11/06/22 0619     Glucose 236 mg/dL      Comment: Meter: [id number] : [id number] Ho Mary NA       POC Glucose Once [[id number]]  (Abnormal) Collected: 11/05/22 2144    Specimen: Blood Updated: 11/05/22 2145     Glucose 228 mg/dL      Comment: Meter: [id number] : [id number] Ho Mary NA       POC Glucose Once [[id number]]  (Abnormal) Collected: 11/05/22 1730    Specimen: Blood Updated: 11/05/22 1731     Glucose 305 mg/dL      Comment: Meter: [id number] : [id number] Wilbert THORNTON RN           Imaging Results (Last 24 Hours)     ** No results found for the last 24 hours. **          Current Facility-Administered Medications:   •  atorvastatin (LIPITOR) tablet 10 mg, 10 mg, Oral, Nightly, Mustapha Mahan MD, 10 mg at 11/05/22 2117  •  brimonidine (ALPHAGAN) 0.2 % ophthalmic solution 1 drop, 1 drop, Both Eyes, BID, Mustapha Mahan MD, 1 drop at 11/05/22 2117  •  clonazePAM (KlonoPIN) tablet 0.5 mg, 0.5 mg, Oral, TID PRN, Cooper Simpson MD, 0.5 mg at 11/02/22 2340  •  Divalproex Sodium (DEPAKOTE SPRINKLE) capsule 250 mg, 250 mg, Oral, TID, Jaylen Heaton III, MD, 250 mg at 11/05/22 2118  •  dorzolamide (TRUSOPT) 2 % ophthalmic solution 1 drop, 1 drop, Both Eyes, BID, Mustapha Mahan MD, 1 drop at 11/06/22 1009  •  Enoxaparin Sodium (LOVENOX) syringe 40 mg, 40 mg, Subcutaneous, Q24H, Cooper Simpson MD, 40 mg at 11/05/22 2116  •  insulin glargine (LANTUS, SEMGLEE) injection 10 Units, 10 Units, Subcutaneous, Nightly, Mustapha Mahan MD, 10 Units at 11/05/22 2157  •  insulin lispro (ADMELOG) injection 0-7 Units, 0-7 Units,  Subcutaneous, 4x Daily With Meals & Nightly, Mustapha Mahan MD, 3 Units at 11/06/22 1231  •  losartan (COZAAR) tablet 100 mg, 100 mg, Oral, Q24H, Mustapha Mahan MD, 100 mg at 11/05/22 1040  •  miconazole (MICOTIN) 2 % cream 1 application, 1 application, Topical, BID, Sumit Lawson MD, 1 application at 11/06/22 1033  •  mupirocin (BACTROBAN) 2 % ointment 1 application, 1 application, Topical, Q12H, Sumit Lawson MD, 1 application at 11/06/22 1010  •  nebivolol (BYSTOLIC) tablet 2.5 mg, 2.5 mg, Oral, Q24H, Mustapha Mahan MD, 2.5 mg at 11/05/22 1040  •  OLANZapine (zyPREXA) injection 5 mg, 5 mg, Intramuscular, Q8H PRN, Cooper Simpson MD, 5 mg at 10/29/22 1611  •  OLANZapine (zyPREXA) tablet 7.5 mg, 7.5 mg, Oral, BID, Jaylen Heaton III, MD, 7.5 mg at 11/05/22 2117  •  pantoprazole (PROTONIX) EC tablet 40 mg, 40 mg, Oral, Q AM, Mustapha Mahan MD, 40 mg at 11/06/22 0558  •  PARoxetine (PAXIL) tablet 10 mg, 10 mg, Oral, Daily, Mustapha Mahan MD, 10 mg at 11/05/22 1319  •  predniSONE (DELTASONE) tablet 10 mg, 10 mg, Oral, Daily With Breakfast, Mustapha Mahan MD, 10 mg at 11/05/22 1041  •  [COMPLETED] Insert peripheral IV, , , Once **AND** sodium chloride 0.9 % flush 10 mL, 10 mL, Intravenous, PRN, Viktor Harrington MD, 10 mL at 10/19/22 0854     ASSESSMENT  Pemphigus vulgaris  E. coli UTI  Insulin-dependent diabetes mellitus   Hypertension  Hyperlipidemia  History of CVA  Dysarthria  Severe dementia  Gastroesophageal reflux disease    PLAN  Discharge to subacute rehab  Discharge summary dictated    Mustapha Mahan MD  11/06/22

## 2022-11-06 NOTE — PLAN OF CARE
Goal Outcome Evaluation:  Plan of Care Reviewed With: patient        Progress: no change  Outcome Evaluation: isolation, foot drsg intact, incont b/b-had large bm, calm/cooperative-tolerated being out of restraints, accuchecks, accumax, davey/rectal areas red, took all meds today

## 2022-11-06 NOTE — DISCHARGE SUMMARY
Discharge summary    Date of admission 10/18/2022  Date of discharge 11/6/2022    Final diagnosis  Bullous eruption pemphigus vulgaris  S/p E. coli UTI  Insulin-dependent diabetes mellitus   Hypertension  Hyperlipidemia  History of CVA  Dysarthria  Severe dementia with behavioral disturbance  Gastroesophageal reflux disease    Discharge medications    Current Facility-Administered Medications:   •  atorvastatin (LIPITOR) tablet 10 mg, 10 mg, Oral, Nightly, Mustapha Mahan MD, 10 mg at 11/05/22 2117  •  brimonidine (ALPHAGAN) 0.2 % ophthalmic solution 1 drop, 1 drop, Both Eyes, BID, Mustapha Mahan MD, 1 drop at 11/05/22 2117  •  Divalproex Sodium (DEPAKOTE SPRINKLE) capsule 250 mg, 250 mg, Oral, TID, Jaylen Heaton III, MD, 250 mg at 11/05/22 2118  •  dorzolamide (TRUSOPT) 2 % ophthalmic solution 1 drop, 1 drop, Both Eyes, BID, Mustapha Mahan MD, 1 drop at 11/06/22 1009  •  Enoxaparin Sodium (LOVENOX) syringe 40 mg, 40 mg, Subcutaneous, Q24H, Cooper Simpson MD, 40 mg at 11/05/22 2116  •  insulin glargine (LANTUS, SEMGLEE) injection 10 Units, 10 Units, Subcutaneous, Nightly, Mustapha Mahan MD, 10 Units at 11/05/22 2157  •  insulin lispro (ADMELOG) injection 0-7 Units, 0-7 Units, Subcutaneous, 4x Daily With Meals & Nightly, Mustapha Mahan MD, 3 Units at 11/06/22 1231  •  losartan (COZAAR) tablet 100 mg, 100 mg, Oral, Q24H, Mustapha Mahan MD, 100 mg at 11/05/22 1040  •  miconazole (MICOTIN) 2 % cream 1 application, 1 application, Topical, BID, Sumit Lawson MD, 1 application at 11/06/22 1033  •  mupirocin (BACTROBAN) 2 % ointment 1 application, 1 application, Topical, Q12H, uSmit Lawson MD, 1 application at 11/06/22 1010  •  nebivolol (BYSTOLIC) tablet 2.5 mg, 2.5 mg, Oral, Q24H, Mustapha Mahan MD, 2.5 mg at 11/05/22 1040  •  OLANZapine (zyPREXA) tablet 7.5 mg, 7.5 mg, Oral, BID, Jaylen Heaton III, MD, 7.5 mg at 11/05/22 2117  •  pantoprazole (PROTONIX) EC tablet 40 mg, 40 mg,  Instructions for patch testing sent to patient's MyChart.     Dr. Hill DANIELLE   Oral, Q AM, Isabell Sandhu MD, 40 mg at 11/06/22 0558  •  PARoxetine (PAXIL) tablet 10 mg, 10 mg, Oral, Daily, Isabell Sandhu MD, 10 mg at 11/05/22 1319  •  [START ON 11/7/2022] predniSONE (DELTASONE) tablet 5 mg, 5 mg, Oral, Daily With Breakfast, Isabell Sandhu MD  •  [COMPLETED] Insert peripheral IV, , , Once **AND** sodium chloride 0.9 % flush 10 mL, 10 mL, Intravenous, PRN, Viktor Harrington MD, 10 mL at 10/19/22 0854     Consults obtained  Dermatology  Psychiatry  Infectious disease    Procedures  None    Hospital course  77-year white female with history of diabetes hypertension hyperlipidemia and CVA in the past admitted to emergency room with left foot wound with blister.  Patient evaluated in ER found to have bullous eruption pemphigus vulgaris probably secondary to drug-induced with Depakote admit for management.  Patient admitted treated with IV steroids and local wound care and further evaluated by dermatology and recommend slow tapering of his steroids.  Patient also found to have acute E. coli UTI and treated with IV antibiotics.  Patient also followed by infectious disease.  During hospitalization patient developed behavioral disturbance with history of severe dementia and treated with Zyprexa and followed by psychiatry.  Patient remained in restraints until yesterday.  Patient will continue prednisone for 5 more days and then stop.  Patient blood sugar up and down secondary to steroids but stable at the time of discharge.    Discharge diet regular    Activity per PT OT    Medication as above    Further care per accepting physician at nursing home and follow-up with dermatology psychiatry and infectious disease per their instruction and take medication as directed.    ISABELL SANDHU MD

## 2022-11-06 NOTE — PROGRESS NOTES
"  Infectious Diseases Progress Note    Faizan Wilder MD     [facility]  Los: 16 days  Patient Identification:  Name: Darby Workman  Age: 77 y.o.  Sex: female  :  1945  MRN: [id number]         Primary Care Physician: Eric Matta MD            Subjective: Does not engage, mumbles and talks and hard to understand.  Interval History: See consultation note.    Objective:    Scheduled Meds:atorvastatin, 10 mg, Oral, Nightly  brimonidine, 1 drop, Both Eyes, BID  Divalproex Sodium, 250 mg, Oral, TID  dorzolamide, 1 drop, Both Eyes, BID  enoxaparin, 40 mg, Subcutaneous, Q24H  insulin glargine, 10 Units, Subcutaneous, Nightly  insulin lispro, 0-7 Units, Subcutaneous, 4x Daily With Meals & Nightly  losartan, 100 mg, Oral, Q24H  miconazole, 1 application, Topical, BID  mupirocin, 1 application, Topical, Q12H  nebivolol, 2.5 mg, Oral, Q24H  OLANZapine, 7.5 mg, Oral, BID  pantoprazole, 40 mg, Oral, Q AM  PARoxetine, 10 mg, Oral, Daily  predniSONE, 10 mg, Oral, Daily With Breakfast      Continuous Infusions:     Vital signs in last 24 hours:  Temp:  [97 °F (36.1 °C)-97.6 °F (36.4 °C)] 97 °F (36.1 °C)  Heart Rate:  [56-72] 68  Resp:  [16] 16  BP: (145-197)/(67-94) 145/67    Intake/Output:    Intake/Output Summary (Last 24 hours) at 2022 2041  Last data filed at 2022 0301  Gross per 24 hour   Intake 150 ml   Output --   Net 150 ml       Exam:  /67 (BP Location: Left arm, Patient Position: Lying)   Pulse 68   Temp 97 °F (36.1 °C) (Axillary)   Resp 16   Ht 165.1 cm (65\")   Wt 76.2 kg (167 lb 15.9 oz)   SpO2 96%   BMI 27.96 kg/m²   Patient is examined using the personal protective equipment as per guidelines from infection control for this particular patient as enacted.  Hand washing was performed before and after patient interaction.  General Appearance: Comfortable but at times agitated.                          Head:    Normocephalic, without obvious abnormality, atraumatic            "                Eyes:    PERRL, conjunctivae/corneas clear, EOM's intact, both eyes                         Throat:   Lips, tongue, gums normal; oral mucosa pink and moist                           Neck:   Supple, symmetrical, trachea midline, no JVD                         Lungs:    Clear to auscultation bilaterally, respirations unlabored                 Chest Wall:    No tenderness or deformity                          Heart:  S1-S2 regular                  Abdomen:   Soft nontender                 Extremities:   Extremities normal, atraumatic, no cyanosis or edema, hand lesions are much better right foot is dressed.                      Neurologic: Significant aphasia due to prior stroke       Data Review:    I reviewed the patient's new clinical results.  Results from last 7 days   Lab Units 11/05/22 0446 11/02/22 0552 11/01/22 0631 10/30/22  0938   WBC 10*3/mm3 5.89 6.30 8.06 7.39   HEMOGLOBIN g/dL 14.1 14.1 13.7 13.8   PLATELETS 10*3/mm3 193 206 187 185     Results from last 7 days   Lab Units 11/05/22  0446 11/02/22  0552 11/01/22  0631 10/31/22  0525 10/30/22  0938   SODIUM mmol/L 142 138 138 137 139   POTASSIUM mmol/L 3.5 4.0 3.6 4.2 3.3*   CHLORIDE mmol/L 106 104 107 105 108*   CO2 mmol/L 27.7 21.0* 22.0 22.0 22.7   BUN mg/dL 20 16 13 14 13   CREATININE mg/dL 0.79 0.71 0.71 0.75 0.70   CALCIUM mg/dL 9.0 8.7 8.5* 8.5* 8.7   GLUCOSE mg/dL 140* 154* 117* 116* 54*       Microbiology Results (last 10 days)     ** No results found for the last 240 hours. **        Microbiology Results (last 10 days)     ** No results found for the last 240 hours. **            Assessment:    Pemphigus vulgaris  MRSA colonization  Leukocytosis secondary to steroid use  Metabolic encephalopathy secondary to UTI-urine culture positive for E. coli.  Recommendations/discussion  · Completed antibiotic treatment on 10/31/2022.  · Observe off of antibiotic therapy  · Continue with local care.  Faizan Wilder MD  11/5/2022  20:41  EDT    Much of this encounter note is an electronic transcription/translation of spoken language to printed text. The electronic translation of spoken language may permit erroneous, or at times, nonsensical words or phrases to be inadvertently transcribed; Although I have reviewed the note for such errors, some may still exist

## 2022-11-07 NOTE — PROGRESS NOTES
Case Management Discharge Note      Final Note: Discharged [facility] with bedhold via EMS. Carla Bear RN         Selected Continued Care - Discharged on 11/6/2022 Admission date: 10/18/2022 - Discharge disposition: Skilled Nursing Facility (DC - External)    Destination Coordination complete.    Service Provider Selected Services Address Phone Fax Patient Preferred    [facility] AT [address] [id number] [phone] --       Internal Comment last updated by Regina Barber RN 11/6/2022 1138    11/6 Per Monica/[facility] pt can return to [facility] today.                       Transportation Services  Ambulance: [facility]    Final Discharge Disposition Code: 04 - intermediate care facility

## 2023-01-01 NOTE — PROGRESS NOTES
Inpatient Rehabilitation Plan of Care Note    Plan of Care  Care Plan Reviewed - No updates at this time.    Psychosocial    Performed Intervention(s)  Assist patient to express needs and concerns      Safety    Performed Intervention(s)  Bed alarm, wc alarm  Safety rounds  Items within reach      Body Systems    Performed Intervention(s)  Daily skin inspection      Sphincter Control    Performed Intervention(s)  Monitor intake and output  Encourage fluid intake  Elimination schedule    Signed by: Leslie Mcdonald RN     1st Trimester Sonogram/20 Week Level II Sonogram/BioPhysical Profile(s)/Non Invasive Prenatal Screen (NIPS)/Ultra Screen at 12 Weeks

## 2023-03-15 NOTE — PROGRESS NOTES
Inpatient Rehabilitation Plan of Care Note    Plan of Care  Care Plan Reviewed - Updates as Follows    Safety    [RN] Potential for Injury(Active)  Current Status(08/20/2019): Impulsive; aphasic; does not use call light.  Irritable/ combative/ agitated at times.  Weekly Goal(08/27/2019): Cue to use call light  Discharge Goal: Pt/family will be aware of risk of fall and safety in the home  setting    Performed Intervention(s)  Bed alarm, wc alarm  Safety rounds  Items within reach  24 hrs sitter      Psychosocial    Performed Intervention(s)  Assist patient to express needs and concerns  calm enviroment      Body Systems    Performed Intervention(s)  Daily skin inspection      Sphincter Control    Performed Intervention(s)  Monitor intake and output  Encourage fluid intake  Elimination schedule  contact isolation    Signed by: Di Saldaña RN     fevers, diarrhea, and decreased PO intake x 1 week. Endorsing generalized weakness. Family states pt had 12lb weight loss x 1 week. Afebrile at this time. Hx of colitis

## 2023-07-19 NOTE — OUTREACH NOTE
Prep Survey      Responses   Facility patient discharged from?  [place]   Is patient eligible?  Yes   Discharge diagnosis  Acute cerebrovascular accident    Does the patient have one of the following disease processes/diagnoses(primary or secondary)?  Stroke (TIA)   Does the patient have Home health ordered?  No   Is there a DME ordered?  No   Prep survey completed?  Yes          Regina Melo RN        
0

## 2023-08-30 NOTE — PROGRESS NOTES
Inpatient Rehabilitation Functional Measures Assessment    Functional Measures  KATI Eating:  [facility] Grooming: [facility] Bathing:  [facility] Upper Body Dressing:  [facility] Lower Body Dressing:  [facility] Toileting:  [facility] Bladder Management  Level of Assistance:  [place]  Frequency/Number of Accidents this Shift:  [facility] Bowel Management  Level of Assistance: [place]  Frequency/Number of Accidents this Shift: [facility] Bed/Chair/Wheelchair Transfer:  [facility] Toilet Transfer:  [facility] Tub/Shower Transfer:  [place]    Previously Documented Mode of Locomotion at Discharge: Field  KATI Expected Mode of Locomotion at Discharge: [facility] Walk/Wheelchair:  [facility] Stairs:  [facility] Comprehension:  Auditory comprehension is the usual mode. Patient does not  comprehend complex/abstract information in their primary language without  assistance from a helper. Comprehension Score = 2, Maximal Prompting. Patient  comprehends basic daily needs 25-49% of the time. Patient understands simple  information via single words or gestures. Requires maximal/a lot of prompting  (most of the time). Patient requires the following assistive device(s): Glasses.    KATI Expression:  Non-vocal expression is the usual mode. Patient does not  express complex/abstract information in their primary language without a helper.  Expression Score = 3, Moderate Prompting. Patient expresses basic daily needs or  ideas 50-74% of the time. Patient requires moderate/some prompting. No assistive  devices were required.  KATI Social Interaction:  Social Interaction Score = 6, Modified Independent.  Patient is modified independent for social interaction, requiring: Requires a  structured or modified environment. Requires additional time. Medications.  KATI Problem Solving:  Activity was not observed.  KATI Memory:  Memory Score = 2, Maximal Prompting. Patient recognizes and  remembers 25-49% of the time. Patient  requires maximal/a lot of prompting (most  of the time) for memory for the following: Disoriented to person, place, time or  situation. Inability to recognize people or remember instructions/directions  requiring short-term recall (minutes, hours, days). Inability to follow  multi-step commands. Needs assistance for use of environmental cues. Needs  assistance for use of memory aids. Unaware of daily routine.    Therapy Mode Minutes  Occupational Therapy: Branch  Physical Therapy: Branch  Speech Language Pathology:  Branch    Signed by: Eda Allen RN     Detail Level: Simple

## 2023-12-05 ENCOUNTER — APPOINTMENT (OUTPATIENT)
Dept: GENERAL RADIOLOGY | Facility: HOSPITAL | Age: 78
DRG: 562 | End: 2023-12-05
Payer: MEDICARE

## 2023-12-05 ENCOUNTER — HOSPITAL ENCOUNTER (INPATIENT)
Facility: HOSPITAL | Age: 78
LOS: 2 days | Discharge: NURSING, REHAB OR LONG-TERM CARE | DRG: 562 | End: 2023-12-08
Attending: EMERGENCY MEDICINE | Admitting: HOSPITALIST
Payer: MEDICARE

## 2023-12-05 ENCOUNTER — APPOINTMENT (OUTPATIENT)
Dept: CT IMAGING | Facility: HOSPITAL | Age: 78
DRG: 562 | End: 2023-12-05
Payer: MEDICARE

## 2023-12-05 DIAGNOSIS — E16.2 HYPOGLYCEMIA: ICD-10-CM

## 2023-12-05 DIAGNOSIS — S82.301A CLOSED FRACTURE OF DISTAL END OF RIGHT TIBIA, UNSPECIFIED FRACTURE MORPHOLOGY, INITIAL ENCOUNTER: Primary | ICD-10-CM

## 2023-12-05 LAB
ANION GAP SERPL CALCULATED.3IONS-SCNC: 7.2 MMOL/L (ref 5–15)
BASOPHILS # BLD AUTO: 0.04 10*3/MM3 (ref 0–0.2)
BASOPHILS NFR BLD AUTO: 0.4 % (ref 0–1.5)
BUN SERPL-MCNC: 18 MG/DL (ref 8–23)
BUN/CREAT SERPL: 25.4 (ref 7–25)
CALCIUM SPEC-SCNC: 8.9 MG/DL (ref 8.6–10.5)
CHLORIDE SERPL-SCNC: 98 MMOL/L (ref 98–107)
CO2 SERPL-SCNC: 29.8 MMOL/L (ref 22–29)
CREAT SERPL-MCNC: 0.71 MG/DL (ref 0.57–1)
DEPRECATED RDW RBC AUTO: 46.1 FL (ref 37–54)
EGFRCR SERPLBLD CKD-EPI 2021: 86.6 ML/MIN/1.73
EOSINOPHIL # BLD AUTO: 0.13 10*3/MM3 (ref 0–0.4)
EOSINOPHIL NFR BLD AUTO: 1.5 % (ref 0.3–6.2)
ERYTHROCYTE [DISTWIDTH] IN BLOOD BY AUTOMATED COUNT: 14.2 % (ref 12.3–15.4)
GLUCOSE BLDC GLUCOMTR-MCNC: 126 MG/DL (ref 70–130)
GLUCOSE BLDC GLUCOMTR-MCNC: 210 MG/DL (ref 70–130)
GLUCOSE BLDC GLUCOMTR-MCNC: 56 MG/DL (ref 70–130)
GLUCOSE BLDC GLUCOMTR-MCNC: 62 MG/DL (ref 70–130)
GLUCOSE BLDC GLUCOMTR-MCNC: 65 MG/DL (ref 70–130)
GLUCOSE SERPL-MCNC: 57 MG/DL (ref 65–99)
HCT VFR BLD AUTO: 36.3 % (ref 34–46.6)
HGB BLD-MCNC: 11.8 G/DL (ref 12–15.9)
HOLD SPECIMEN: NORMAL
IMM GRANULOCYTES # BLD AUTO: 0.04 10*3/MM3 (ref 0–0.05)
IMM GRANULOCYTES NFR BLD AUTO: 0.4 % (ref 0–0.5)
LYMPHOCYTES # BLD AUTO: 1.19 10*3/MM3 (ref 0.7–3.1)
LYMPHOCYTES NFR BLD AUTO: 13.3 % (ref 19.6–45.3)
MCH RBC QN AUTO: 29 PG (ref 26.6–33)
MCHC RBC AUTO-ENTMCNC: 32.5 G/DL (ref 31.5–35.7)
MCV RBC AUTO: 89.2 FL (ref 79–97)
MONOCYTES # BLD AUTO: 0.59 10*3/MM3 (ref 0.1–0.9)
MONOCYTES NFR BLD AUTO: 6.6 % (ref 5–12)
NEUTROPHILS NFR BLD AUTO: 6.96 10*3/MM3 (ref 1.7–7)
NEUTROPHILS NFR BLD AUTO: 77.8 % (ref 42.7–76)
NRBC BLD AUTO-RTO: 0 /100 WBC (ref 0–0.2)
NT-PROBNP SERPL-MCNC: 517 PG/ML (ref 0–1800)
PLATELET # BLD AUTO: 270 10*3/MM3 (ref 140–450)
PMV BLD AUTO: 9.1 FL (ref 6–12)
POTASSIUM SERPL-SCNC: 4 MMOL/L (ref 3.5–5.2)
RBC # BLD AUTO: 4.07 10*6/MM3 (ref 3.77–5.28)
SODIUM SERPL-SCNC: 135 MMOL/L (ref 136–145)
WBC NRBC COR # BLD AUTO: 8.95 10*3/MM3 (ref 3.4–10.8)
WHOLE BLOOD HOLD COAG: NORMAL
WHOLE BLOOD HOLD SPECIMEN: NORMAL

## 2023-12-05 PROCEDURE — 99285 EMERGENCY DEPT VISIT HI MDM: CPT

## 2023-12-05 PROCEDURE — 82948 REAGENT STRIP/BLOOD GLUCOSE: CPT

## 2023-12-05 PROCEDURE — 25010000002 HYDROMORPHONE PER 4 MG: Performed by: HOSPITALIST

## 2023-12-05 PROCEDURE — G0378 HOSPITAL OBSERVATION PER HR: HCPCS

## 2023-12-05 PROCEDURE — 83880 ASSAY OF NATRIURETIC PEPTIDE: CPT | Performed by: HOSPITALIST

## 2023-12-05 PROCEDURE — 25010000002 ONDANSETRON PER 1 MG: Performed by: EMERGENCY MEDICINE

## 2023-12-05 PROCEDURE — 73502 X-RAY EXAM HIP UNI 2-3 VIEWS: CPT

## 2023-12-05 PROCEDURE — 85025 COMPLETE CBC W/AUTO DIFF WBC: CPT | Performed by: EMERGENCY MEDICINE

## 2023-12-05 PROCEDURE — 80048 BASIC METABOLIC PNL TOTAL CA: CPT | Performed by: EMERGENCY MEDICINE

## 2023-12-05 PROCEDURE — 72192 CT PELVIS W/O DYE: CPT

## 2023-12-05 PROCEDURE — 73590 X-RAY EXAM OF LOWER LEG: CPT

## 2023-12-05 PROCEDURE — 73700 CT LOWER EXTREMITY W/O DYE: CPT

## 2023-12-05 PROCEDURE — 73552 X-RAY EXAM OF FEMUR 2/>: CPT

## 2023-12-05 PROCEDURE — 25010000002 ONDANSETRON PER 1 MG: Performed by: HOSPITALIST

## 2023-12-05 PROCEDURE — 25010000002 HYDROMORPHONE PER 4 MG: Performed by: EMERGENCY MEDICINE

## 2023-12-05 PROCEDURE — 73610 X-RAY EXAM OF ANKLE: CPT

## 2023-12-05 RX ORDER — INSULIN LISPRO 100 [IU]/ML
0-7 INJECTION, SOLUTION INTRAVENOUS; SUBCUTANEOUS
Status: DISCONTINUED | OUTPATIENT
Start: 2023-12-05 | End: 2023-12-05

## 2023-12-05 RX ORDER — HYDROCODONE BITARTRATE AND ACETAMINOPHEN 5; 325 MG/1; MG/1
1 TABLET ORAL EVERY 6 HOURS PRN
Status: DISCONTINUED | OUTPATIENT
Start: 2023-12-05 | End: 2023-12-09 | Stop reason: HOSPADM

## 2023-12-05 RX ORDER — ONDANSETRON 2 MG/ML
4 INJECTION INTRAMUSCULAR; INTRAVENOUS EVERY 6 HOURS PRN
Status: DISCONTINUED | OUTPATIENT
Start: 2023-12-05 | End: 2023-12-08

## 2023-12-05 RX ORDER — BRIMONIDINE TARTRATE 2 MG/ML
1 SOLUTION/ DROPS OPHTHALMIC 2 TIMES DAILY
Status: DISCONTINUED | OUTPATIENT
Start: 2023-12-05 | End: 2023-12-09 | Stop reason: HOSPADM

## 2023-12-05 RX ORDER — CLONAZEPAM 0.5 MG/1
0.5 TABLET ORAL 3 TIMES DAILY PRN
Status: DISCONTINUED | OUTPATIENT
Start: 2023-12-05 | End: 2023-12-08

## 2023-12-05 RX ORDER — NEBIVOLOL 5 MG/1
2.5 TABLET ORAL
Status: DISCONTINUED | OUTPATIENT
Start: 2023-12-05 | End: 2023-12-09 | Stop reason: HOSPADM

## 2023-12-05 RX ORDER — HYDROMORPHONE HYDROCHLORIDE 1 MG/ML
0.25 INJECTION, SOLUTION INTRAMUSCULAR; INTRAVENOUS; SUBCUTANEOUS ONCE
Status: COMPLETED | OUTPATIENT
Start: 2023-12-05 | End: 2023-12-05

## 2023-12-05 RX ORDER — NYSTATIN 100000 U/G
1 CREAM TOPICAL 2 TIMES DAILY
COMMUNITY

## 2023-12-05 RX ORDER — LOSARTAN POTASSIUM 50 MG/1
100 TABLET ORAL
Status: DISCONTINUED | OUTPATIENT
Start: 2023-12-05 | End: 2023-12-05

## 2023-12-05 RX ORDER — PANTOPRAZOLE SODIUM 40 MG/1
40 TABLET, DELAYED RELEASE ORAL
Status: DISCONTINUED | OUTPATIENT
Start: 2023-12-06 | End: 2023-12-09 | Stop reason: HOSPADM

## 2023-12-05 RX ORDER — SODIUM CHLORIDE 0.9 % (FLUSH) 0.9 %
10 SYRINGE (ML) INJECTION AS NEEDED
Status: DISCONTINUED | OUTPATIENT
Start: 2023-12-05 | End: 2023-12-09 | Stop reason: HOSPADM

## 2023-12-05 RX ORDER — PAROXETINE 10 MG/1
10 TABLET, FILM COATED ORAL DAILY
Status: DISCONTINUED | OUTPATIENT
Start: 2023-12-05 | End: 2023-12-09 | Stop reason: HOSPADM

## 2023-12-05 RX ORDER — HYDROMORPHONE HYDROCHLORIDE 1 MG/ML
0.25 INJECTION, SOLUTION INTRAMUSCULAR; INTRAVENOUS; SUBCUTANEOUS EVERY 4 HOURS PRN
Status: DISCONTINUED | OUTPATIENT
Start: 2023-12-05 | End: 2023-12-08

## 2023-12-05 RX ORDER — ONDANSETRON 4 MG/1
4 TABLET, ORALLY DISINTEGRATING ORAL EVERY 6 HOURS PRN
COMMUNITY

## 2023-12-05 RX ORDER — DEXTROSE MONOHYDRATE 25 G/50ML
25 INJECTION, SOLUTION INTRAVENOUS
Status: DISCONTINUED | OUTPATIENT
Start: 2023-12-05 | End: 2023-12-08

## 2023-12-05 RX ORDER — ACETAMINOPHEN 325 MG/1
650 TABLET ORAL EVERY 4 HOURS PRN
Status: DISCONTINUED | OUTPATIENT
Start: 2023-12-05 | End: 2023-12-08

## 2023-12-05 RX ORDER — OLANZAPINE 7.5 MG/1
7.5 TABLET, FILM COATED ORAL 2 TIMES DAILY
Status: DISCONTINUED | OUTPATIENT
Start: 2023-12-05 | End: 2023-12-09 | Stop reason: HOSPADM

## 2023-12-05 RX ORDER — DEXTROSE MONOHYDRATE 25 G/50ML
25 INJECTION, SOLUTION INTRAVENOUS ONCE
Status: DISCONTINUED | OUTPATIENT
Start: 2023-12-05 | End: 2023-12-05

## 2023-12-05 RX ORDER — LOSARTAN POTASSIUM 25 MG/1
25 TABLET ORAL
Status: DISCONTINUED | OUTPATIENT
Start: 2023-12-05 | End: 2023-12-09 | Stop reason: HOSPADM

## 2023-12-05 RX ORDER — DEXTROSE MONOHYDRATE 25 G/50ML
INJECTION, SOLUTION INTRAVENOUS
Status: COMPLETED
Start: 2023-12-05 | End: 2023-12-05

## 2023-12-05 RX ORDER — ACETAMINOPHEN 325 MG/1
650 TABLET ORAL EVERY 6 HOURS
Status: DISCONTINUED | OUTPATIENT
Start: 2023-12-05 | End: 2023-12-05

## 2023-12-05 RX ORDER — SODIUM CHLORIDE 0.9 % (FLUSH) 0.9 %
10 SYRINGE (ML) INJECTION AS NEEDED
Status: DISCONTINUED | OUTPATIENT
Start: 2023-12-05 | End: 2023-12-08

## 2023-12-05 RX ORDER — IBUPROFEN 600 MG/1
1 TABLET ORAL
Status: DISCONTINUED | OUTPATIENT
Start: 2023-12-05 | End: 2023-12-08

## 2023-12-05 RX ORDER — DIVALPROEX SODIUM 125 MG/1
125 TABLET, DELAYED RELEASE ORAL DAILY
Status: DISCONTINUED | OUTPATIENT
Start: 2023-12-05 | End: 2023-12-09 | Stop reason: HOSPADM

## 2023-12-05 RX ORDER — NICOTINE POLACRILEX 4 MG
15 LOZENGE BUCCAL
Status: DISCONTINUED | OUTPATIENT
Start: 2023-12-05 | End: 2023-12-08

## 2023-12-05 RX ORDER — ONDANSETRON 2 MG/ML
4 INJECTION INTRAMUSCULAR; INTRAVENOUS ONCE
Status: COMPLETED | OUTPATIENT
Start: 2023-12-05 | End: 2023-12-05

## 2023-12-05 RX ORDER — DORZOLAMIDE HCL 20 MG/ML
1 SOLUTION/ DROPS OPHTHALMIC 2 TIMES DAILY
Status: DISCONTINUED | OUTPATIENT
Start: 2023-12-05 | End: 2023-12-09 | Stop reason: HOSPADM

## 2023-12-05 RX ORDER — INSULIN LISPRO 100 [IU]/ML
2-7 INJECTION, SOLUTION INTRAVENOUS; SUBCUTANEOUS
Status: DISCONTINUED | OUTPATIENT
Start: 2023-12-05 | End: 2023-12-09 | Stop reason: HOSPADM

## 2023-12-05 RX ORDER — ASPIRIN 81 MG/1
81 TABLET, CHEWABLE ORAL DAILY
Status: DISCONTINUED | OUTPATIENT
Start: 2023-12-05 | End: 2023-12-09 | Stop reason: HOSPADM

## 2023-12-05 RX ORDER — ATORVASTATIN CALCIUM 10 MG/1
10 TABLET, FILM COATED ORAL DAILY
COMMUNITY

## 2023-12-05 RX ORDER — ACETAMINOPHEN 650 MG
1 TABLET, EXTENDED RELEASE ORAL DAILY
Status: DISCONTINUED | OUTPATIENT
Start: 2023-12-05 | End: 2023-12-05

## 2023-12-05 RX ORDER — DEXTROSE MONOHYDRATE 25 G/50ML
25 INJECTION, SOLUTION INTRAVENOUS ONCE
Status: COMPLETED | OUTPATIENT
Start: 2023-12-05 | End: 2023-12-05

## 2023-12-05 RX ADMIN — HYDROMORPHONE HYDROCHLORIDE 0.25 MG: 1 INJECTION, SOLUTION INTRAMUSCULAR; INTRAVENOUS; SUBCUTANEOUS at 17:37

## 2023-12-05 RX ADMIN — DORZOLAMIDE HYDROCHLORIDE 1 DROP: 20 SOLUTION/ DROPS OPHTHALMIC at 21:54

## 2023-12-05 RX ADMIN — DEXTROSE MONOHYDRATE 25 G: 25 INJECTION, SOLUTION INTRAVENOUS at 12:29

## 2023-12-05 RX ADMIN — BRIMONIDINE TARTRATE 1 DROP: 2 SOLUTION OPHTHALMIC at 21:54

## 2023-12-05 RX ADMIN — ONDANSETRON 4 MG: 2 INJECTION INTRAMUSCULAR; INTRAVENOUS at 17:36

## 2023-12-05 RX ADMIN — OLANZAPINE 7.5 MG: 7.5 TABLET, FILM COATED ORAL at 21:54

## 2023-12-05 RX ADMIN — HYDROMORPHONE HYDROCHLORIDE 0.25 MG: 1 INJECTION, SOLUTION INTRAMUSCULAR; INTRAVENOUS; SUBCUTANEOUS at 13:29

## 2023-12-05 RX ADMIN — ONDANSETRON 4 MG: 2 INJECTION INTRAMUSCULAR; INTRAVENOUS at 13:29

## 2023-12-05 RX ADMIN — HYDROCODONE BITARTRATE AND ACETAMINOPHEN 1 TABLET: 5; 325 TABLET ORAL at 22:49

## 2023-12-05 RX ADMIN — DEXTROSE MONOHYDRATE 25 G: 25 INJECTION, SOLUTION INTRAVENOUS at 22:39

## 2023-12-05 RX ADMIN — CLONAZEPAM 0.5 MG: 0.5 TABLET ORAL at 22:39

## 2023-12-05 NOTE — ED NOTES
Nursing report ED to floor  Darby Workman  78 y.o.  female    HPI :   Chief Complaint   Patient presents with    Leg Injury       Admitting doctor:   Mustapha Mahan MD    Admitting diagnosis:   The primary encounter diagnosis was Closed fracture of distal end of right tibia, unspecified fracture morphology, initial encounter. A diagnosis of Hypoglycemia was also pertinent to this visit.    Code status:   Current Code Status       Date Active Code Status Order ID Comments User Context       12/5/2023 1507 CPR (Attempt to Resuscitate) [id number]  Mustapha Mahan MD ED        Question Answer    Code Status (Patient has no pulse and is not breathing) CPR (Attempt to Resuscitate)    Medical Interventions (Patient has pulse or is breathing) Full Support    Level Of Support Discussed With Patient                    Allergies:   Patient has no known allergies.    Isolation:   No active isolations    Intake and Output  No intake or output data in the 24 hours ending 12/05/23 1807    Weight:       12/05/23  1230   Weight: 74.4 kg (164 lb)       Most recent vitals:   Vitals:    12/05/23 1551 12/05/23 1601 12/05/23 1603 12/05/23 1718   BP: 106/84      Pulse:  71 72 81   Resp:       Temp:       TempSrc:       SpO2:  94% 95% 92%   Weight:       Height:           Active LDAs/IV Access:   Lines, Drains & Airways       Active LDAs       Name Placement date Placement time Site Days    Peripheral IV 12/05/23 1229 Anterior;Distal;Left Wrist 12/05/23  1229  Wrist  less than 1                    Labs (abnormal labs have a star):   Labs Reviewed   BASIC METABOLIC PANEL - Abnormal; Notable for the following components:       Result Value    Glucose 57 (*)     Sodium 135 (*)     CO2 29.8 (*)     BUN/Creatinine Ratio 25.4 (*)     All other components within normal limits    Narrative:     GFR Normal >60  Chronic Kidney Disease <60  Kidney Failure <15    The GFR formula is only valid for adults with stable renal function between ages 18 and 70.    CBC WITH AUTO DIFFERENTIAL - Abnormal; Notable for the following components:    Hemoglobin 11.8 (*)     Neutrophil % 77.8 (*)     Lymphocyte % 13.3 (*)     All other components within normal limits   POCT GLUCOSE FINGERSTICK - Abnormal; Notable for the following components:    Glucose 56 (*)     All other components within normal limits   BNP (IN-HOUSE) - Normal    Narrative:     This assay is used as an aid in the diagnosis of individuals suspected of having heart failure. It can be used as an aid in the diagnosis of acute decompensated heart failure (ADHF) in patients presenting with signs and symptoms of ADHF to the emergency department (ED). In addition, NT-proBNP of <300 pg/mL indicates ADHF is not likely.    Age Range Result Interpretation  NT-proBNP Concentration (pg/mL:      <50             Positive            >450                   Gray                 300-450                    Negative             <300    50-75           Positive            >900                  Gray                300-900                  Negative            <300      >75             Positive            >1800                  Gray                300-1800                  Negative            <300   POCT GLUCOSE FINGERSTICK - Normal   RAINBOW DRAW    Narrative:     The following orders were created for panel order [place] Draw.  Procedure                               Abnormality         Status                     ---------                               -----------         ------                     Green Top (Gel)[[id number]]                                  Final result               Lavender Top[[id number]]                                     Final result               Light Blue Top[[id number]]                                   Final result                 Please view results for these tests on the individual orders.   POCT GLUCOSE FINGERSTICK   POCT GLUCOSE FINGERSTICK   POCT GLUCOSE FINGERSTICK   POCT GLUCOSE FINGERSTICK   POCT  GLUCOSE FINGERSTICK   POCT GLUCOSE FINGERSTICK   GREEN TOP   LAVENDER TOP   LIGHT BLUE TOP   CBC AND DIFFERENTIAL    Narrative:     The following orders were created for panel order CBC & Differential.  Procedure                               Abnormality         Status                     ---------                               -----------         ------                     CBC Auto Differential[[id number]]        Abnormal            Final result                 Please view results for these tests on the individual orders.       EKG:   No orders to display       Meds given in ED:   Medications   sodium chloride 0.9 % flush 10 mL (has no administration in time range)   sodium chloride 0.9 % flush 10 mL (has no administration in time range)   ondansetron (ZOFRAN) injection 4 mg (4 mg Intravenous Given 12/5/23 1736)   HYDROmorphone (DILAUDID) injection 0.25 mg (0.25 mg Intravenous Given 12/5/23 1737)   brimonidine (ALPHAGAN) 0.2 % ophthalmic solution 1 drop (has no administration in time range)   clonazePAM (KlonoPIN) tablet 0.5 mg (has no administration in time range)   divalproex (DEPAKOTE) DR tablet 125 mg (0 mg Oral Hold 12/5/23 1628)   dorzolamide (TRUSOPT) 2 % ophthalmic solution 1 drop (has no administration in time range)   nebivolol (BYSTOLIC) tablet 2.5 mg (0 mg Oral Hold 12/5/23 1628)   OLANZapine (zyPREXA) tablet 7.5 mg (has no administration in time range)   pantoprazole (PROTONIX) EC tablet 40 mg (has no administration in time range)   PARoxetine (PAXIL) tablet 10 mg (0 mg Oral Hold 12/5/23 1629)   losartan (COZAAR) tablet 25 mg (0 mg Oral Hold 12/5/23 1629)   acetaminophen (TYLENOL) tablet 650 mg (has no administration in time range)   dextrose (GLUTOSE) oral gel 15 g (has no administration in time range)   dextrose (D50W) (25 g/50 mL) IV injection 25 g (has no administration in time range)   glucagon (GLUCAGEN) injection 1 mg (has no administration in time range)   insulin lispro (HUMALOG/ADMELOG)  injection 2-7 Units (has no administration in time range)   HYDROcodone-acetaminophen (NORCO) 5-325 MG per tablet 1 tablet (has no administration in time range)   aspirin chewable tablet 81 mg (0 mg Oral Hold 12/5/23 1629)   dextrose (D50W) (25 g/50 mL) IV injection 25 g (25 g Intravenous Given 12/5/23 1229)   HYDROmorphone (DILAUDID) injection 0.25 mg (0.25 mg Intravenous Given 12/5/23 1329)   ondansetron (ZOFRAN) injection 4 mg (4 mg Intravenous Given 12/5/23 1329)       Imaging results:  CT Pelvis Without Contrast    Result Date: 12/5/2023  No acute fracture identified around the pelvis or the proximal femurs. There is asymmetric soft tissue edema in the right thigh.      XR Ankle 3+ View Right    Result Date: 12/5/2023  The bones appear profoundly demineralized and there are acute appearing distal right tibia and fibula diametaphysis fractures.  This report was finalized on 12/5/2023 4:41 PM by Dr. Ajit Day M.D on Workstation: [id number]       Ambulatory status:   - assist    Social issues:   Social History     Socioeconomic History    Marital status:    Tobacco Use    Smoking status: Never    Smokeless tobacco: Never   Vaping Use    Vaping Use: Never used   Substance and Sexual Activity    Alcohol use: No    Drug use: No    Sexual activity: Defer       NIH Stroke Scale:       Pacheco Jamil RN  12/05/23 18:07 EST

## 2023-12-05 NOTE — ED NOTES
Pt to ed [facility] via EMS      Pt c/o injured R leg. Pt is bed confined, EMS doesn't know when pt fell or how. Facility did Xrays and pt had R tib/fib fracture. Pt hx dementia. Pt at baseline line mental status per EMS. Pt BG is 57

## 2023-12-05 NOTE — ED PROVIDER NOTES
EMERGENCY DEPARTMENT ENCOUNTER    Room Number:  12/12  PCP: Eric Matta MD      HPI:  Chief Complaint: Abnormal imaging  A complete HPI/ROS/PMH/PSH/SH/FH are unobtainable due to: Nonverbal  Context: Darby Workman is a 78 y.o. female who presents to the ED c/o abnormal imaging.  Patient comes from facility.  They obtained an x-ray of her right leg that showed tib-fib fracture.  No known falls.          PAST MEDICAL HISTORY  Active Ambulatory Problems     Diagnosis Date Noted    Hypertensive crisis 05/14/2019    Diabetes mellitus 05/14/2019    Hyperlipidemia 05/14/2019    Hypertension 05/14/2019    Elevated troponin 05/14/2019    Acute cerebrovascular accident (CVA) of cerebellum 05/15/2019    Right-sided headache 05/21/2019    Acute ischemic stroke 05/21/2019    Embolic stroke 05/22/2019    Anticoagulated by anticoagulation treatment 06/28/2019    Stroke (cerebrum) 08/01/2019    Dysthymic disorder 08/29/2019    Hypotension 09/04/2019    Upper GI bleed 03/27/2020    Uremic encephalopathy 04/01/2020    Acute kidney injury 04/01/2020    Acute pancreatitis 04/01/2020    Hemorrhagic shock 04/01/2020    Thrombocytopenia 04/01/2020    Type 2 diabetes mellitus with hyperglycemia 04/01/2020    Dementia without behavioral disturbance 04/01/2020    Acute posthemorrhagic anemia 04/01/2020    Pemphigus vulgaris 10/18/2022     Resolved Ambulatory Problems     Diagnosis Date Noted    Cerebral infarction due to stenosis of left carotid artery 06/28/2019    KIERAN (acute kidney injury) 09/04/2019     Past Medical History:   Diagnosis Date    Arthritis     Coronary artery disease     CVA (cerebral vascular accident)     Heart attack     History of blood clots     Vision loss          PAST SURGICAL HISTORY  Past Surgical History:   Procedure Laterality Date    CAROTID ENDARTERECTOMY Left 7/17/2019    Procedure: Left carotid endarterectomy;  Surgeon: Rupert Oliva MD;  Location: [facility] OR;  Service: Neurosurgery     EMBOLECTOMY Left 7/17/2019    Procedure: CEREBRAL ANGIOGRAM, LEFT INTERNAL CAROTID ARTERY STENT;  Surgeon: Rupert Oliva MD;  Location: [facility] HYBRID OR 18/19;  Service: Neurosurgery    ENDOSCOPY N/A 3/28/2020    Procedure: ESOPHAGOGASTRODUODENOSCOPY AT BEDSIDE WITH EPI INJECTION AND GOLD PROBE CAUTERIZATION;  Surgeon: Malathi Bright MD;  Location: [facility] ENDOSCOPY;  Service: Gastroenterology;  Laterality: N/A;  PRE GI BLEED  POST EROSIVE GASTRITIS, DUODENAL ULCER WITH CLOT         FAMILY HISTORY  Family History   Problem Relation Age of Onset    Arthritis Mother     Heart disease Father     Breast cancer Neg Hx     Malig Hyperthermia Neg Hx          SOCIAL HISTORY  Social History     Socioeconomic History    Marital status:    Tobacco Use    Smoking status: Never    Smokeless tobacco: Never   Vaping Use    Vaping Use: Never used   Substance and Sexual Activity    Alcohol use: No    Drug use: No    Sexual activity: Defer         ALLERGIES  Patient has no known allergies.        REVIEW OF SYSTEMS  Review of Systems     All systems reviewed and negative except for those discussed in HPI.       PHYSICAL EXAM  ED Triage Vitals [12/05/23 1207]   Temp Heart Rate Resp BP SpO2   99.5 °F (37.5 °C) 90 18 167/96 96 %      Temp src Heart Rate Source Patient Position BP Location FiO2 (%)   Tympanic Monitor -- -- --       Physical Exam      GENERAL: no acute distress  HENT: nares patent, scalp is atraumatic  EYES: no scleral icterus  CV: regular rhythm, normal rate, good cap refill to the feet bilaterally, 2+ edema to the feet bilaterally  RESPIRATORY: normal effort, clear to auscultation bilaterally  ABDOMEN: soft, nontender  MUSCULOSKELETAL: Tenderness to the right tib-fib, no pain to the left leg  NEURO: alert, moves all extremities, follows commands  PSYCH:  calm, cooperative  SKIN: warm, dry    Vital signs and nursing notes reviewed.          LAB RESULTS  Recent Results (from the past 24 hour(s))   POC  Glucose Once    Collection Time: 12/05/23 12:24 PM    Specimen: Blood   Result Value Ref Range    Glucose 56 (L) 70 - 130 mg/dL   Green Top (Gel)    Collection Time: 12/05/23 12:32 PM   Result Value Ref Range    Extra Tube Hold for add-ons.    Lavender Top    Collection Time: 12/05/23 12:32 PM   Result Value Ref Range    Extra Tube hold for add-on    Light Blue Top    Collection Time: 12/05/23 12:32 PM   Result Value Ref Range    Extra Tube Hold for add-ons.    Basic Metabolic Panel    Collection Time: 12/05/23 12:32 PM    Specimen: Blood   Result Value Ref Range    Glucose 57 (L) 65 - 99 mg/dL    BUN 18 8 - 23 mg/dL    Creatinine 0.71 0.57 - 1.00 mg/dL    Sodium 135 (L) 136 - 145 mmol/L    Potassium 4.0 3.5 - 5.2 mmol/L    Chloride 98 98 - 107 mmol/L    CO2 29.8 (H) 22.0 - 29.0 mmol/L    Calcium 8.9 8.6 - 10.5 mg/dL    BUN/Creatinine Ratio 25.4 (H) 7.0 - 25.0    Anion Gap 7.2 5.0 - 15.0 mmol/L    eGFR 86.6 >60.0 mL/min/1.73   CBC Auto Differential    Collection Time: 12/05/23 12:32 PM    Specimen: Blood   Result Value Ref Range    WBC 8.95 3.40 - 10.80 10*3/mm3    RBC 4.07 3.77 - 5.28 10*6/mm3    Hemoglobin 11.8 (L) 12.0 - 15.9 g/dL    Hematocrit 36.3 34.0 - 46.6 %    MCV 89.2 79.0 - 97.0 fL    MCH 29.0 26.6 - 33.0 pg    MCHC 32.5 31.5 - 35.7 g/dL    RDW 14.2 12.3 - 15.4 %    RDW-SD 46.1 37.0 - 54.0 fl    MPV 9.1 6.0 - 12.0 fL    Platelets 270 140 - 450 10*3/mm3    Neutrophil % 77.8 (H) 42.7 - 76.0 %    Lymphocyte % 13.3 (L) 19.6 - 45.3 %    Monocyte % 6.6 5.0 - 12.0 %    Eosinophil % 1.5 0.3 - 6.2 %    Basophil % 0.4 0.0 - 1.5 %    Immature Grans % 0.4 0.0 - 0.5 %    Neutrophils, Absolute 6.96 1.70 - 7.00 10*3/mm3    Lymphocytes, Absolute 1.19 0.70 - 3.10 10*3/mm3    Monocytes, Absolute 0.59 0.10 - 0.90 10*3/mm3    Eosinophils, Absolute 0.13 0.00 - 0.40 10*3/mm3    Basophils, Absolute 0.04 0.00 - 0.20 10*3/mm3    Immature Grans, Absolute 0.04 0.00 - 0.05 10*3/mm3    nRBC 0.0 0.0 - 0.2 /100 WBC   BNP    Collection  Time: 12/05/23 12:32 PM    Specimen: Blood   Result Value Ref Range    proBNP 517.0 0.0 - 1,800.0 pg/mL   POC Glucose Once    Collection Time: 12/05/23  2:16 PM    Specimen: Blood   Result Value Ref Range    Glucose 126 70 - 130 mg/dL       Ordered the above labs and reviewed the results.        RADIOLOGY  XR Ankle 3+ View Right    Result Date: 12/5/2023  X-RAY RIGHT ANKLE  HISTORY: Ankle pain. Fracture.  3 x-rays of the right ankle are correlated with lower leg x-rays from earlier today.  FINDINGS: The bones are diffusely demineralized. There are fractures of the distal tibia and fibula extending obliquely across the distal tibial diametaphysis with minimal impaction and obliquely across the distal fibular diametaphysis without displacement or impaction. There is diffuse soft tissue edema.  The visualized bones in the foot are intact.      The bones appear profoundly demineralized and there are acute appearing distal right tibia and fibula diametaphysis fractures.      XR Tibia Fibula 2 View Right, XR Femur 2 View Right, XR Hip With or Without Pelvis 2 - 3 View Right    Result Date: 12/5/2023  XR HIP W OR WO PELVIS 2-3 VIEW RIGHT-, XR TIBIA FIBULA 2 VW RIGHT-, XR FEMUR 2 VW RIGHT-  Clinical: Evaluate for fracture  FINDINGS: There is an angulated fracture through the distal tibia at the diaphyseal metaphyseal junction. There is an adjacent angulated fracture through the distal fibula at the diaphyseal metaphyseal junction. There is soft tissue swelling of the distal calf and foot. Visualized portions of the foot demonstrate demineralization. Vascular arterial calcifications within the soft tissues. The mid and distal femur have a satisfactory appearance. Greater trochanter superimposes the femoral neck on the AP projection. Femoral neck fracture not excluded. Computed tomography of the right hip is recommended as follow-up. The right hemipelvis is satisfactory in appearance.  CONCLUSION: Angulated fracture involving  the distal tibia and fibula at the diaphyseal metaphyseal junction. Greater trochanter superimposes the right femoral neck on the AP projection, cannot exclude femoral neck fracture. CT of the right hip as follow-up.  This report was finalized on 12/5/2023 1:48 PM by Dr. David Ferrara M.D on Workstation: [id number]       Ordered the above noted radiological studies. Reviewed by me in PACS.          PROCEDURES  Splint - Cast - Strapping    Date/Time: 12/5/2023 3:32 PM    Performed by: Alexandre Rosa II, MD  Authorized by: Alexandre Rosa II, MD    Consent:     Consent obtained:  Verbal    Consent given by:  Patient  Pre-procedure details:     Distal neurologic exam:  Normal    Distal perfusion: brisk capillary refill    Procedure details:     Location:  Leg    Leg location:  R lower leg    Splint type:  Ankle stirrup and short leg    Supplies:  Fiberglass and cotton padding    Attestation: Splint applied and adjusted personally by me    Post-procedure details:     Distal neurologic exam:  Normal    Distal perfusion: brisk capillary refill      Procedure completion:  Tolerated well, no immediate complications    Post-procedure imaging: not applicable            MEDICATIONS GIVEN IN ER  Medications   sodium chloride 0.9 % flush 10 mL (has no administration in time range)   sodium chloride 0.9 % flush 10 mL (has no administration in time range)   ondansetron (ZOFRAN) injection 4 mg (has no administration in time range)   HYDROmorphone (DILAUDID) injection 0.25 mg (has no administration in time range)   brimonidine (ALPHAGAN) 0.2 % ophthalmic solution 1 drop (has no administration in time range)   clonazePAM (KlonoPIN) tablet 0.5 mg (has no administration in time range)   divalproex (DEPAKOTE) DR tablet 125 mg (has no administration in time range)   dorzolamide (TRUSOPT) 2 % ophthalmic solution 1 drop (has no administration in time range)   nebivolol (BYSTOLIC) tablet 2.5 mg (has no administration in time  range)   OLANZapine (zyPREXA) tablet 7.5 mg (has no administration in time range)   pantoprazole (PROTONIX) EC tablet 40 mg (has no administration in time range)   PARoxetine (PAXIL) tablet 10 mg (has no administration in time range)   losartan (COZAAR) tablet 25 mg (has no administration in time range)   acetaminophen (TYLENOL) tablet 650 mg (has no administration in time range)   dextrose (GLUTOSE) oral gel 15 g (has no administration in time range)   dextrose (D50W) (25 g/50 mL) IV injection 25 g (has no administration in time range)   glucagon (GLUCAGEN) injection 1 mg (has no administration in time range)   insulin lispro (HUMALOG/ADMELOG) injection 2-7 Units (has no administration in time range)   HYDROcodone-acetaminophen (NORCO) 5-325 MG per tablet 1 tablet (has no administration in time range)   aspirin chewable tablet 81 mg (has no administration in time range)   dextrose (D50W) (25 g/50 mL) IV injection 25 g (25 g Intravenous Given 12/5/23 1229)   HYDROmorphone (DILAUDID) injection 0.25 mg (0.25 mg Intravenous Given 12/5/23 1329)   ondansetron (ZOFRAN) injection 4 mg (4 mg Intravenous Given 12/5/23 1329)         MEDICAL DECISION MAKING, PROGRESS, and CONSULTS    Discussion below represents my analysis of pertinent findings related to patient's condition, differential diagnosis, treatment plan and final disposition.      Orders placed during this visit:  Orders Placed This Encounter   Procedures    XR Tibia Fibula 2 View Right    XR Femur 2 View Right    XR Hip With or Without Pelvis 2 - 3 View Right    XR Ankle 3+ View Right    CT Pelvis Without Contrast    CT Lower Extremity Right Without Contrast    [place] Draw    Basic Metabolic Panel    CBC Auto Differential    Comprehensive Metabolic Panel    BNP    TSH    Lipid Panel    Hemoglobin A1c    Monitor Blood Pressure    Pulse Oximetry, Continuous    Place Sequential Compression Device    Maintain Sequential Compression Device    Bed Rest    Code Status  and Medical Interventions:    Ortho (on-call MD unless specified)    POC Glucose Once    POC Glucose Once    POC Glucose Once    POC Glucose 4x Daily Before Meals & at Bedtime    Insert peripheral IV    Insert Peripheral IV    Initiate Observation Status    Green Top (Gel)    Lavender Top    Light Blue Top    CBC & Differential    CBC & Differential                 Differential diagnosis:    Fracture, dislocation            Independent interpretation of labs, radiology studies, and discussions with consultants:  ED Course as of 23 1531   Tue Dec 05, 2023   1233 Glucose(!): 56 [TD]   1407 Sodium(!): 135 [TD]   1445 Glucose: 126 [TD]      ED Course User Index  [TD] Alexandre Rosa II, MD         Patient's head is atraumatic.  I do not think she needs a CT scan of her head.    There is some question about possible hip fracture for which I have ordered a CT of the pelvis.    I discussed the case Dr. Chavez, repeat  has requested add on CT of the lower extremity to better visualize the fracture near the distal tib-fib.    I discussed the case with Dr. Mahan, hospitalist.  He will admit the patient primarily.      DIAGNOSIS  Final diagnoses:   Closed fracture of distal end of right tibia, unspecified fracture morphology, initial encounter   Hypoglycemia         DISPOSITION  Admit      Latest Documented Vital Signs:  As of 15:31 EST  BP- 112/65 HR- 78 Temp- 99.5 °F (37.5 °C) (Tympanic) O2 sat- 93%      --    Please note that portions of this were completed with a voice recognition program.       Note Disclaimer: At [facility], we believe that sharing information builds trust and better relationships. You are receiving this note because you are receiving care at [facility] or recently visited. It is possible you will see health information before a provider has talked with you about it. This kind of information can be easy to misunderstand. To help you fully understand what it means for your  health, we urge you to discuss this note with your provider.         Alexandre Rosa II, MD  12/05/23 9998

## 2023-12-05 NOTE — PROGRESS NOTES
Clinical Pharmacy Services: Medication History    Darby Workman is a 78 y.o. female presenting to [facility] for   Chief Complaint   Patient presents with    Leg Injury       She  has a past medical history of Acute cerebrovascular accident (CVA) of cerebellum, Acute ischemic stroke, Arthritis, Coronary artery disease, CVA (cerebral vascular accident), Diabetes mellitus, Heart attack, History of blood clots, Hyperlipidemia, Hypertension, and Vision loss.    Allergies as of 12/05/2023    (No Known Allergies)       Medication information was obtained from: long-term paperwork   Pharmacy and Phone Number:     Prior to Admission Medications       Prescriptions Last Dose Informant Patient Reported? Taking?    acetaminophen (TYLENOL) 500 MG tablet  Nursing Home Yes Yes    Take 2 tablets by mouth Every 6 (Six) Hours As Needed for Mild Pain.    atorvastatin (LIPITOR) 10 MG tablet 12/4/2023 Nursing Home Yes Yes    Take 1 tablet by mouth Daily.    brimonidine (ALPHAGAN) 0.2 % ophthalmic solution 12/5/2023 Nursing Home Yes Yes    Administer 1 drop to both eyes 2 (Two) Times a Day. Indications: Wide-Angle Glaucoma    dorzolamide (TRUSOPT) 2 % ophthalmic solution 12/5/2023 Nursing Home Yes Yes    Administer 1 drop to both eyes 2 (Two) Times a Day. Indications: Wide-Angle Glaucoma    insulin glargine (LANTUS, SEMGLEE) 100 UNIT/ML injection 12/5/2023 Nursing Home Yes Yes    Inject 18 Units under the skin into the appropriate area as directed 2 (Two) Times a Day.    losartan (COZAAR) 100 MG tablet 12/5/2023 Nursing Home No Yes    Take 1 tablet by mouth Daily.    Patient taking differently:  Take 1 tablet by mouth Daily.    metoprolol tartrate (LOPRESSOR) 25 MG tablet 12/5/2023 Nursing Home Yes Yes    Take 2 tablets by mouth 2 (Two) Times a Day.    mupirocin (BACTROBAN) 2 % ointment 12/5/2023 Nursing Home Yes Yes    Apply 1 application  topically to the appropriate area as directed 3 (Three) Times a Day.     nystatin (MYCOSTATIN) 217587 UNIT/GM cream 12/5/2023 Nursing Home Yes Yes    Apply 1 application  topically to the appropriate area as directed 2 (Two) Times a Day.    ondansetron ODT (ZOFRAN-ODT) 4 MG disintegrating tablet  Nursing Home Yes Yes    Place 1 tablet on the tongue Every 6 (Six) Hours As Needed for Nausea or Vomiting.              Medication notes:     This medication list is complete to the best of my knowledge as of 12/5/2023    Please call if questions.    Jose Sumner  Medication History Technician   [phone]    12/5/2023 17:59 EST

## 2023-12-05 NOTE — ED NOTES
Nursing report ED to floor  Darby Workman  78 y.o.  female    HPI :   Chief Complaint   Patient presents with    Leg Injury       Admitting doctor:   Mustapha Mahan MD    Admitting diagnosis:   The primary encounter diagnosis was Closed fracture of distal end of right tibia, unspecified fracture morphology, initial encounter. A diagnosis of Hypoglycemia was also pertinent to this visit.    Code status:   Current Code Status       Date Active Code Status Order ID Comments User Context       12/5/2023 1507 CPR (Attempt to Resuscitate) [id number]  Mustapha Mahan MD ED        Question Answer    Code Status (Patient has no pulse and is not breathing) CPR (Attempt to Resuscitate)    Medical Interventions (Patient has pulse or is breathing) Full Support    Level Of Support Discussed With Patient                    Allergies:   Patient has no known allergies.    Isolation:   No active isolations    Intake and Output  No intake or output data in the 24 hours ending 12/05/23 1811    Weight:       12/05/23  1230   Weight: 74.4 kg (164 lb)       Most recent vitals:   Vitals:    12/05/23 1551 12/05/23 1601 12/05/23 1603 12/05/23 1718   BP: 106/84      Pulse:  71 72 81   Resp:       Temp:       TempSrc:       SpO2:  94% 95% 92%   Weight:       Height:           Active LDAs/IV Access:   Lines, Drains & Airways       Active LDAs       Name Placement date Placement time Site Days    Peripheral IV 12/05/23 1229 Anterior;Distal;Left Wrist 12/05/23  1229  Wrist  less than 1                    Labs (abnormal labs have a star):   Labs Reviewed   BASIC METABOLIC PANEL - Abnormal; Notable for the following components:       Result Value    Glucose 57 (*)     Sodium 135 (*)     CO2 29.8 (*)     BUN/Creatinine Ratio 25.4 (*)     All other components within normal limits    Narrative:     GFR Normal >60  Chronic Kidney Disease <60  Kidney Failure <15    The GFR formula is only valid for adults with stable renal function between ages 18 and 70.    CBC WITH AUTO DIFFERENTIAL - Abnormal; Notable for the following components:    Hemoglobin 11.8 (*)     Neutrophil % 77.8 (*)     Lymphocyte % 13.3 (*)     All other components within normal limits   POCT GLUCOSE FINGERSTICK - Abnormal; Notable for the following components:    Glucose 56 (*)     All other components within normal limits   BNP (IN-HOUSE) - Normal    Narrative:     This assay is used as an aid in the diagnosis of individuals suspected of having heart failure. It can be used as an aid in the diagnosis of acute decompensated heart failure (ADHF) in patients presenting with signs and symptoms of ADHF to the emergency department (ED). In addition, NT-proBNP of <300 pg/mL indicates ADHF is not likely.    Age Range Result Interpretation  NT-proBNP Concentration (pg/mL:      <50             Positive            >450                   Gray                 300-450                    Negative             <300    50-75           Positive            >900                  Gray                300-900                  Negative            <300      >75             Positive            >1800                  Gray                300-1800                  Negative            <300   POCT GLUCOSE FINGERSTICK - Normal   RAINBOW DRAW    Narrative:     The following orders were created for panel order [place] Draw.  Procedure                               Abnormality         Status                     ---------                               -----------         ------                     Green Top (Gel)[[id number]]                                  Final result               Lavender Top[[id number]]                                     Final result               Light Blue Top[[id number]]                                   Final result                 Please view results for these tests on the individual orders.   POCT GLUCOSE FINGERSTICK   POCT GLUCOSE FINGERSTICK   POCT GLUCOSE FINGERSTICK   POCT GLUCOSE FINGERSTICK   POCT  GLUCOSE FINGERSTICK   POCT GLUCOSE FINGERSTICK   GREEN TOP   LAVENDER TOP   LIGHT BLUE TOP   CBC AND DIFFERENTIAL    Narrative:     The following orders were created for panel order CBC & Differential.  Procedure                               Abnormality         Status                     ---------                               -----------         ------                     CBC Auto Differential[[id number]]        Abnormal            Final result                 Please view results for these tests on the individual orders.       EKG:   No orders to display       Meds given in ED:   Medications   sodium chloride 0.9 % flush 10 mL (has no administration in time range)   sodium chloride 0.9 % flush 10 mL (has no administration in time range)   ondansetron (ZOFRAN) injection 4 mg (4 mg Intravenous Given 12/5/23 1736)   HYDROmorphone (DILAUDID) injection 0.25 mg (0.25 mg Intravenous Given 12/5/23 1737)   brimonidine (ALPHAGAN) 0.2 % ophthalmic solution 1 drop (has no administration in time range)   clonazePAM (KlonoPIN) tablet 0.5 mg (has no administration in time range)   divalproex (DEPAKOTE) DR tablet 125 mg (0 mg Oral Hold 12/5/23 1628)   dorzolamide (TRUSOPT) 2 % ophthalmic solution 1 drop (has no administration in time range)   nebivolol (BYSTOLIC) tablet 2.5 mg (0 mg Oral Hold 12/5/23 1628)   OLANZapine (zyPREXA) tablet 7.5 mg (has no administration in time range)   pantoprazole (PROTONIX) EC tablet 40 mg (has no administration in time range)   PARoxetine (PAXIL) tablet 10 mg (0 mg Oral Hold 12/5/23 1629)   losartan (COZAAR) tablet 25 mg (0 mg Oral Hold 12/5/23 1629)   acetaminophen (TYLENOL) tablet 650 mg (has no administration in time range)   dextrose (GLUTOSE) oral gel 15 g (has no administration in time range)   dextrose (D50W) (25 g/50 mL) IV injection 25 g (has no administration in time range)   glucagon (GLUCAGEN) injection 1 mg (has no administration in time range)   insulin lispro (HUMALOG/ADMELOG)  injection 2-7 Units (has no administration in time range)   HYDROcodone-acetaminophen (NORCO) 5-325 MG per tablet 1 tablet (has no administration in time range)   aspirin chewable tablet 81 mg (0 mg Oral Hold 12/5/23 1629)   dextrose (D50W) (25 g/50 mL) IV injection 25 g (25 g Intravenous Given 12/5/23 1229)   HYDROmorphone (DILAUDID) injection 0.25 mg (0.25 mg Intravenous Given 12/5/23 1329)   ondansetron (ZOFRAN) injection 4 mg (4 mg Intravenous Given 12/5/23 1329)       Imaging results:  CT Pelvis Without Contrast    Result Date: 12/5/2023  No acute fracture identified around the pelvis or the proximal femurs. There is asymmetric soft tissue edema in the right thigh.      XR Ankle 3+ View Right    Result Date: 12/5/2023  The bones appear profoundly demineralized and there are acute appearing distal right tibia and fibula diametaphysis fractures.  This report was finalized on 12/5/2023 4:41 PM by Dr. Ajit Day M.D on Workstation: [id number]       Ambulatory status:   - bedrest     Social issues:   Social History     Socioeconomic History    Marital status:    Tobacco Use    Smoking status: Never    Smokeless tobacco: Never   Vaping Use    Vaping Use: Never used   Substance and Sexual Activity    Alcohol use: No    Drug use: No    Sexual activity: Defer       NIH Stroke Scale:       Mario Saunders RN  12/05/23 18:11 EST

## 2023-12-05 NOTE — H&P
History and physical    Primary care physician      Chief complaint  Right tibia-fibula fracture    History of present illness  78-year-old white female who is well-known to our service with history of severe dementia CVA diabetes hypertension hyperlipidemia and coronary artery disease who is a nursing home resident brought to the emergency room and found to have right tibia-fibula fracture.  Patient is demented unable to give detailed history and most of the history obtained from the chart nursing staff old record and I know the patient from previous admission.  Patient denies any fall trauma injury but hurting at the right lower extremity more than usual for last few days.    PAST MEDICAL HISTORY   Insulin-dependent diabetes mellitus      Coronary artery disease      Cerebral vascular accident      Hypertension      Hyperlipidemia      Severe dementia        PAST SURGICAL HISTORY              Procedure Laterality Date    CAROTID ENDARTERECTOMY Left 7/17/2019     Procedure: Left carotid endarterectomy;  Surgeon: Rupert Oliva MD;  Location: [facility] MAIN OR;  Service: Neurosurgery    EMBOLECTOMY Left 7/17/2019     Procedure: CEREBRAL ANGIOGRAM, LEFT INTERNAL CAROTID ARTERY STENT;  Surgeon: Rupert Oliva MD;  Location: [facility] HYBRID OR 18/19;  Service: Neurosurgery    ENDOSCOPY N/A 3/28/2020     Procedure: ESOPHAGOGASTRODUODENOSCOPY AT BEDSIDE WITH EPI INJECTION AND GOLD PROBE CAUTERIZATION;  Surgeon: Malathi Bright MD;  Location: [facility] ENDOSCOPY;  Service: Gastroenterology;  Laterality: N/A;  PRE GI BLEED  POST EROSIVE GASTRITIS, DUODENAL ULCER WITH CLOT         FAMILY HISTORY           Problem Relation Age of Onset    Arthritis Mother      Heart disease Father      Breast cancer Neg Hx      Malig Hyperthermia Neg Hx        SOCIAL HISTORY                Socioeconomic History    Marital status:    Tobacco Use    Smoking status: Never    Smokeless tobacco: Never   Vaping Use    Vaping Use:  "Never used   Substance and Sexual Activity    Alcohol use: No    Drug use: No    Sexual activity: Defer         ALLERGIES  Patient has no known allergies.  Home medications reviewed     REVIEW OF SYSTEMS  All systems reviewed and negative except for those discussed in HPI.      PHYSICAL EXAM  Blood pressure 112/65, pulse 78, temperature 99.5 °F (37.5 °C), temperature source Tympanic, resp. rate 18, height 165.1 cm (65\"), weight 74.4 kg (164 lb), SpO2 93%.    GENERAL: Awake and alert no acute distress  HENT: nares patent, scalp is atraumatic  EYES: no scleral icterus  CV: regular rhythm, normal rate, 2+ edema to the feet bilaterally  RESPIRATORY: normal effort, clear to auscultation bilaterally  ABDOMEN: soft, nontender nondistended bowel sounds positive  MUSCULOSKELETAL: Tenderness to the right tib-fib, no pain to the left leg  NEURO: alert, moves all extremities, follows commands  PSYCH:  calm, cooperative  SKIN: warm, dry     LAB RESULTS  Lab Results (last 24 hours)       Procedure Component Value Units Date/Time    BNP [[id number]] Collected: 12/05/23 1232    Specimen: Blood Updated: 12/05/23 1508    POC Glucose Once [[id number]]  (Normal) Collected: 12/05/23 1416    Specimen: Blood Updated: 12/05/23 1417     Glucose 126 mg/dL     [place] Draw [[id number]] Collected: 12/05/23 1232    Specimen: Blood Updated: 12/05/23 1345    Narrative:      The following orders were created for panel order [place] Draw.  Procedure                               Abnormality         Status                     ---------                               -----------         ------                     Green Top (Gel)[[id number]]                                  Final result               Lavender Top[[id number]]                                     Final result               Light Blue Top[[id number]]                                   Final result                 Please view results for these tests on the individual orders.    Green Top (Gel) " [[id number]] Collected: 12/05/23 1232    Specimen: Blood Updated: 12/05/23 1345     Extra Tube Hold for add-ons.     Comment: Auto resulted.       Lavender Top [[id number]] Collected: 12/05/23 1232    Specimen: Blood Updated: 12/05/23 1345     Extra Tube hold for add-on     Comment: Auto resulted       Light Blue Top [[id number]] Collected: 12/05/23 1232    Specimen: Blood Updated: 12/05/23 1345     Extra Tube Hold for add-ons.     Comment: Auto resulted       Basic Metabolic Panel [[id number]]  (Abnormal) Collected: 12/05/23 1232    Specimen: Blood Updated: 12/05/23 1342     Glucose 57 mg/dL      BUN 18 mg/dL      Creatinine 0.71 mg/dL      Sodium 135 mmol/L      Potassium 4.0 mmol/L      Comment: Specimen hemolyzed.  Result may be falsely elevated.        Chloride 98 mmol/L      CO2 29.8 mmol/L      Calcium 8.9 mg/dL      BUN/Creatinine Ratio 25.4     Anion Gap 7.2 mmol/L      eGFR 86.6 mL/min/1.73     Narrative:      GFR Normal >60  Chronic Kidney Disease <60  Kidney Failure <15    The GFR formula is only valid for adults with stable renal function between ages 18 and 70.    CBC & Differential [[id number]]  (Abnormal) Collected: 12/05/23 1232    Specimen: Blood Updated: 12/05/23 1258    Narrative:      The following orders were created for panel order CBC & Differential.  Procedure                               Abnormality         Status                     ---------                               -----------         ------                     CBC Auto Differential[[id number]]        Abnormal            Final result                 Please view results for these tests on the individual orders.    CBC Auto Differential [[id number]]  (Abnormal) Collected: 12/05/23 1232    Specimen: Blood Updated: 12/05/23 1258     WBC 8.95 10*3/mm3      RBC 4.07 10*6/mm3      Hemoglobin 11.8 g/dL      Hematocrit 36.3 %      MCV 89.2 fL      MCH 29.0 pg      MCHC 32.5 g/dL      RDW 14.2 %      RDW-SD 46.1 fl      MPV 9.1 fL      Platelets  270 10*3/mm3      Neutrophil % 77.8 %      Lymphocyte % 13.3 %      Monocyte % 6.6 %      Eosinophil % 1.5 %      Basophil % 0.4 %      Immature Grans % 0.4 %      Neutrophils, Absolute 6.96 10*3/mm3      Lymphocytes, Absolute 1.19 10*3/mm3      Monocytes, Absolute 0.59 10*3/mm3      Eosinophils, Absolute 0.13 10*3/mm3      Basophils, Absolute 0.04 10*3/mm3      Immature Grans, Absolute 0.04 10*3/mm3      nRBC 0.0 /100 WBC     POC Glucose Once [[id number]]  (Abnormal) Collected: 12/05/23 1224    Specimen: Blood Updated: 12/05/23 1226     Glucose 56 mg/dL           Imaging Results (Last 24 Hours)       Procedure Component Value Units Date/Time    XR Ankle 3+ View Right [[id number]] Collected: 12/05/23 1508     Updated: 12/05/23 1508    Narrative:      X-RAY RIGHT ANKLE     HISTORY: Ankle pain. Fracture.     3 x-rays of the right ankle are correlated with lower leg x-rays from  earlier today.     FINDINGS: The bones are diffusely demineralized. There are fractures of  the distal tibia and fibula extending obliquely across the distal tibial  diametaphysis with minimal impaction and obliquely across the distal  fibular diametaphysis without displacement or impaction. There is  diffuse soft tissue edema.     The visualized bones in the foot are intact.       Impression:      The bones appear profoundly demineralized and there are  acute appearing distal right tibia and fibula diametaphysis fractures.       XR Tibia Fibula 2 View Right [[id number]] Collected: 12/05/23 1344     Updated: 12/05/23 1351    Narrative:      XR HIP W OR WO PELVIS 2-3 VIEW RIGHT-, XR TIBIA FIBULA 2 VW RIGHT-, XR  FEMUR 2 VW RIGHT-     Clinical: Evaluate for fracture     FINDINGS: There is an angulated fracture through the distal tibia at the  diaphyseal metaphyseal junction. There is an adjacent angulated fracture  through the distal fibula at the diaphyseal metaphyseal junction. There  is soft tissue swelling of the distal calf and foot. Visualized  portions  of the foot demonstrate demineralization. Vascular arterial  calcifications within the soft tissues. The mid and distal femur have a  satisfactory appearance. Greater trochanter superimposes the femoral  neck on the AP projection. Femoral neck fracture not excluded. Computed  tomography of the right hip is recommended as follow-up. The right  hemipelvis is satisfactory in appearance.     CONCLUSION: Angulated fracture involving the distal tibia and fibula at  the diaphyseal metaphyseal junction. Greater trochanter superimposes the  right femoral neck on the AP projection, cannot exclude femoral neck  fracture. CT of the right hip as follow-up.     This report was finalized on 12/5/2023 1:48 PM by Dr. David Ferrara M.D  on Workstation: [id number]       XR Femur 2 View Right [[id number]] Collected: 12/05/23 1344     Updated: 12/05/23 1351    Narrative:      XR HIP W OR WO PELVIS 2-3 VIEW RIGHT-, XR TIBIA FIBULA 2 VW RIGHT-, XR  FEMUR 2 VW RIGHT-     Clinical: Evaluate for fracture     FINDINGS: There is an angulated fracture through the distal tibia at the  diaphyseal metaphyseal junction. There is an adjacent angulated fracture  through the distal fibula at the diaphyseal metaphyseal junction. There  is soft tissue swelling of the distal calf and foot. Visualized portions  of the foot demonstrate demineralization. Vascular arterial  calcifications within the soft tissues. The mid and distal femur have a  satisfactory appearance. Greater trochanter superimposes the femoral  neck on the AP projection. Femoral neck fracture not excluded. Computed  tomography of the right hip is recommended as follow-up. The right  hemipelvis is satisfactory in appearance.     CONCLUSION: Angulated fracture involving the distal tibia and fibula at  the diaphyseal metaphyseal junction. Greater trochanter superimposes the  right femoral neck on the AP projection, cannot exclude femoral neck  fracture. CT of the right hip as  follow-up.     This report was finalized on 12/5/2023 1:48 PM by Dr. David Ferrara M.D  on Workstation: [id number]       XR Hip With or Without Pelvis 2 - 3 View Right [[id number]] Collected: 12/05/23 1344     Updated: 12/05/23 1351    Narrative:      XR HIP W OR WO PELVIS 2-3 VIEW RIGHT-, XR TIBIA FIBULA 2 VW RIGHT-, XR  FEMUR 2 VW RIGHT-     Clinical: Evaluate for fracture     FINDINGS: There is an angulated fracture through the distal tibia at the  diaphyseal metaphyseal junction. There is an adjacent angulated fracture  through the distal fibula at the diaphyseal metaphyseal junction. There  is soft tissue swelling of the distal calf and foot. Visualized portions  of the foot demonstrate demineralization. Vascular arterial  calcifications within the soft tissues. The mid and distal femur have a  satisfactory appearance. Greater trochanter superimposes the femoral  neck on the AP projection. Femoral neck fracture not excluded. Computed  tomography of the right hip is recommended as follow-up. The right  hemipelvis is satisfactory in appearance.     CONCLUSION: Angulated fracture involving the distal tibia and fibula at  the diaphyseal metaphyseal junction. Greater trochanter superimposes the  right femoral neck on the AP projection, cannot exclude femoral neck  fracture. CT of the right hip as follow-up.     This report was finalized on 12/5/2023 1:48 PM by Dr. David Ferrara M.D  on Workstation: [id number]             ASSESSMENT  Right distal tibia/fibula fracture  Probable right femoral neck fracture  Severe dementia  Insulin-dependent diabetes mellitus  Hypertension  Hyperlipidemia  History of CVA  Gastroesophageal reflux disease    PLAN  Admit  Bedrest  Pain management  Orthopedic surgery consult  Adjust nursing home medications  Stress ulcer DVT prophylaxis   Supportive care  Patient is full code   Discussed with nursing staff  Follow closely further recommendation current hospital course    ISABELL SANDHU  MD

## 2023-12-06 PROBLEM — S82.209A TIBIA/FIBULA FRACTURE: Status: ACTIVE | Noted: 2023-12-06

## 2023-12-06 PROBLEM — S82.409A TIBIA/FIBULA FRACTURE: Status: ACTIVE | Noted: 2023-12-06

## 2023-12-06 LAB
BASOPHILS # BLD AUTO: 0.07 10*3/MM3 (ref 0–0.2)
BASOPHILS NFR BLD AUTO: 0.9 % (ref 0–1.5)
CHOLEST SERPL-MCNC: 125 MG/DL (ref 0–200)
DEPRECATED RDW RBC AUTO: 44.4 FL (ref 37–54)
EOSINOPHIL # BLD AUTO: 0.43 10*3/MM3 (ref 0–0.4)
EOSINOPHIL NFR BLD AUTO: 5.7 % (ref 0.3–6.2)
ERYTHROCYTE [DISTWIDTH] IN BLOOD BY AUTOMATED COUNT: 13.7 % (ref 12.3–15.4)
GLUCOSE BLDC GLUCOMTR-MCNC: 105 MG/DL (ref 70–130)
GLUCOSE BLDC GLUCOMTR-MCNC: 120 MG/DL (ref 70–130)
GLUCOSE BLDC GLUCOMTR-MCNC: 173 MG/DL (ref 70–130)
GLUCOSE BLDC GLUCOMTR-MCNC: 227 MG/DL (ref 70–130)
GLUCOSE BLDC GLUCOMTR-MCNC: 234 MG/DL (ref 70–130)
HBA1C MFR BLD: 9.7 % (ref 4.8–5.6)
HCT VFR BLD AUTO: 35.9 % (ref 34–46.6)
HDLC SERPL-MCNC: 34 MG/DL (ref 40–60)
HGB BLD-MCNC: 11.8 G/DL (ref 12–15.9)
IMM GRANULOCYTES # BLD AUTO: 0.1 10*3/MM3 (ref 0–0.05)
IMM GRANULOCYTES NFR BLD AUTO: 1.3 % (ref 0–0.5)
LDLC SERPL CALC-MCNC: 77 MG/DL (ref 0–100)
LDLC/HDLC SERPL: 2.26 {RATIO}
LYMPHOCYTES # BLD AUTO: 1.56 10*3/MM3 (ref 0.7–3.1)
LYMPHOCYTES NFR BLD AUTO: 20.6 % (ref 19.6–45.3)
MCH RBC QN AUTO: 29.1 PG (ref 26.6–33)
MCHC RBC AUTO-ENTMCNC: 32.9 G/DL (ref 31.5–35.7)
MCV RBC AUTO: 88.6 FL (ref 79–97)
MONOCYTES # BLD AUTO: 0.55 10*3/MM3 (ref 0.1–0.9)
MONOCYTES NFR BLD AUTO: 7.3 % (ref 5–12)
NEUTROPHILS NFR BLD AUTO: 4.87 10*3/MM3 (ref 1.7–7)
NEUTROPHILS NFR BLD AUTO: 64.2 % (ref 42.7–76)
NRBC BLD AUTO-RTO: 0.1 /100 WBC (ref 0–0.2)
PLATELET # BLD AUTO: 220 10*3/MM3 (ref 140–450)
PMV BLD AUTO: 9.4 FL (ref 6–12)
RBC # BLD AUTO: 4.05 10*6/MM3 (ref 3.77–5.28)
TRIGL SERPL-MCNC: 70 MG/DL (ref 0–150)
TSH SERPL DL<=0.05 MIU/L-ACNC: 1.02 UIU/ML (ref 0.27–4.2)
VLDLC SERPL-MCNC: 14 MG/DL (ref 5–40)
WBC NRBC COR # BLD AUTO: 7.58 10*3/MM3 (ref 3.4–10.8)

## 2023-12-06 PROCEDURE — 84443 ASSAY THYROID STIM HORMONE: CPT | Performed by: HOSPITALIST

## 2023-12-06 PROCEDURE — 87102 FUNGUS ISOLATION CULTURE: CPT | Performed by: HOSPITALIST

## 2023-12-06 PROCEDURE — 82948 REAGENT STRIP/BLOOD GLUCOSE: CPT

## 2023-12-06 PROCEDURE — 85025 COMPLETE CBC W/AUTO DIFF WBC: CPT | Performed by: HOSPITALIST

## 2023-12-06 PROCEDURE — 36415 COLL VENOUS BLD VENIPUNCTURE: CPT | Performed by: HOSPITALIST

## 2023-12-06 PROCEDURE — 63710000001 INSULIN LISPRO (HUMAN) PER 5 UNITS: Performed by: HOSPITALIST

## 2023-12-06 PROCEDURE — 83036 HEMOGLOBIN GLYCOSYLATED A1C: CPT | Performed by: HOSPITALIST

## 2023-12-06 PROCEDURE — 80061 LIPID PANEL: CPT | Performed by: HOSPITALIST

## 2023-12-06 RX ORDER — CASTOR OIL AND BALSAM, PERU 788; 87 MG/G; MG/G
1 OINTMENT TOPICAL EVERY 12 HOURS SCHEDULED
Status: DISCONTINUED | OUTPATIENT
Start: 2023-12-06 | End: 2023-12-09 | Stop reason: HOSPADM

## 2023-12-06 RX ADMIN — ZINC OXIDE 1 APPLICATION: 200 OINTMENT TOPICAL at 18:17

## 2023-12-06 RX ADMIN — BRIMONIDINE TARTRATE 1 DROP: 2 SOLUTION OPHTHALMIC at 20:56

## 2023-12-06 RX ADMIN — OLANZAPINE 7.5 MG: 7.5 TABLET, FILM COATED ORAL at 20:54

## 2023-12-06 RX ADMIN — INSULIN LISPRO 3 UNITS: 100 INJECTION, SOLUTION INTRAVENOUS; SUBCUTANEOUS at 18:17

## 2023-12-06 RX ADMIN — OLANZAPINE 7.5 MG: 7.5 TABLET, FILM COATED ORAL at 10:11

## 2023-12-06 RX ADMIN — PANTOPRAZOLE SODIUM 40 MG: 40 TABLET, DELAYED RELEASE ORAL at 05:16

## 2023-12-06 RX ADMIN — NEBIVOLOL 2.5 MG: 5 TABLET ORAL at 10:11

## 2023-12-06 RX ADMIN — INSULIN LISPRO 3 UNITS: 100 INJECTION, SOLUTION INTRAVENOUS; SUBCUTANEOUS at 20:52

## 2023-12-06 RX ADMIN — CASTOR OIL AND BALSAM, PERU 1 APPLICATION: 788; 87 OINTMENT TOPICAL at 23:56

## 2023-12-06 RX ADMIN — PAROXETINE HYDROCHLORIDE 10 MG: 10 TABLET, FILM COATED ORAL at 10:11

## 2023-12-06 RX ADMIN — DORZOLAMIDE HYDROCHLORIDE 1 DROP: 20 SOLUTION/ DROPS OPHTHALMIC at 20:56

## 2023-12-06 RX ADMIN — ZINC OXIDE 1 APPLICATION: 200 OINTMENT TOPICAL at 21:02

## 2023-12-06 NOTE — PLAN OF CARE
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 Able to swallow her food and meds without difficulty. Mepilex placed on pt buttock, Wound Care Nurse consulted.

## 2023-12-06 NOTE — CASE MANAGEMENT/SOCIAL WORK
Discharge Planning Assessment  [facility]     Patient Name: Darby Workman  MRN: [id number]  Today's Date: 12/6/2023    Admit Date: 12/5/2023    Plan: Return to [facility] bed hold when medically ready   Discharge Needs Assessment       Row Name 12/06/23 1332       Living Environment    People in Home facility resident    Current Living Arrangements extended care facility    Potentially Unsafe Housing Conditions none    Primary Care Provided by other (see comments)    Provides Primary Care For no one, unable/limited ability to care for self    Family Caregiver if Needed none    Quality of Family Relationships helpful;involved;supportive    Able to Return to Prior Arrangements yes       Resource/Environmental Concerns    Resource/Environmental Concerns none    Transportation Concerns none       Transition Planning    Patient/Family Anticipates Transition to inpatient rehabilitation facility    Transportation Anticipated health plan transportation       Discharge Needs Assessment    Readmission Within the Last 30 Days no previous admission in last 30 days    Equipment Currently Used at Home none    Anticipated Changes Related to Illness none    Equipment Needed After Discharge none    Provided Post Acute Provider List? N/A    Provided Post Acute Provider Quality & Resource List? N/A                   Discharge Plan       Row Name 12/06/23 9030       Plan    Plan Return to [facility] bed hold when medically ready    Patient/Family in Agreement with Plan yes    Provided Post Acute Provider List? N/A    Provided Post Acute Provider Quality & Resource List? N/A    Plan Comments CCP contacted the patient’s daughter Katerine Workman ([phone]). Introduced self and explained role of CCP. Daughter confirmed the information on her face sheet is accurate. Patients PCP is Eric Matta. Patient is from [facility]. Family would like for her to return.  CCP contacted Mario at [facility] and he stated that the  patient is able to return to the facility when medically ready. Patient will need transport.                  Continued Care and Services - Admitted Since 12/5/2023       Destination       Service Provider Request Status Selected Services Address Phone Fax Patient Preferred    [facility] [place] [facility] [address] [facility] [id number] [phone] --                     Demographic Summary       Row Name 12/06/23 1331       General Information    Admission Type observation    Arrived From emergency department    Referral Source admission list    Reason for Consult discharge planning    Preferred Language English                   Functional Status       Row Name 12/06/23 1331       Functional Status    Usual Activity Tolerance poor    Current Activity Tolerance poor       Functional Status, IADL    Medications completely dependent    Meal Preparation completely dependent    Housekeeping completely dependent    Laundry completely dependent    Shopping completely dependent       Mental Status    General Appearance WDL WDL       Mental Status Summary    Recent Changes in Mental Status/Cognitive Functioning no changes       Employment/    Employment Status retired                   Psychosocial    No documentation.                  Abuse/Neglect    No documentation.                  Legal    No documentation.                  Substance Abuse    No documentation.                  Patient Forms    No documentation.

## 2023-12-06 NOTE — CONSULTS
Orthopedic Consult      Patient: Darby Workman    Date of Admission: 12/5/2023 12:09 PM    YOB: 1945    Medical Record Number: [id number]    Consulting Physician: Mustapha Mahan MD    Chief Complaints: Right distal tibia and fibula fracture    History of Present Illness: 78 y.o. female admitted to [facility] to services of Mustapha Mahan MD with abnormal imaging.  Patient is a resident of a skilled nursing facility and had x-rays done there of both her right hip and right ankle.  Patient was then transferred to the hospital for further treatment.  There is no known history of any falls.  X-rays of her right leg show acute fracture of the right distal tibia and fibula.  Patient is unable to provide any history.  She is able to answer yes and no questions appropriately.   Past medical history includes hypertension, hyperlipidemia, CVA,  Coronary artery disease, diabetes, and history of DVT.     Patient is in a well-padded splint on the right lower extremity.  Toes appear to be warm and pink.  However I cannot get her to wiggle her toes on command.  She appears to have a flexion contracture of her right knee and right hip with her right leg flexed at the knee and externally rotated.  Patient is very guarded and cries out when you try to move her leg.  Splint does not appear to be too tight.  Although there is concerns for potential skin breakdown due to pressure from the splint against her posterior thigh.  According to the records the patient is bedbound.      Allergies: No Known Allergies    Home Medications:    Current Facility-Administered Medications:     acetaminophen (TYLENOL) tablet 650 mg, 650 mg, Oral, Q4H PRN, Mustapha Mahan MD    aspirin chewable tablet 81 mg, 81 mg, Oral, Daily, Mustapha Mahan MD    brimonidine (ALPHAGAN) 0.2 % ophthalmic solution 1 drop, 1 drop, Both Eyes, BID, Mustapha Mahan MD, 1 drop at 12/05/23 0860    castor oil-balsam peru (VENELEX) ointment 1 application , 1  application , Topical, Q12H, Mustapha Mahan MD    clonazePAM (KlonoPIN) tablet 0.5 mg, 0.5 mg, Oral, TID PRN, Mustapha Mahan MD, 0.5 mg at 12/05/23 2239    dextrose (D50W) (25 g/50 mL) IV injection 25 g, 25 g, Intravenous, Q15 Min PRN, Mustapha Mahan MD, 25 g at 12/05/23 2239    dextrose (GLUTOSE) oral gel 15 g, 15 g, Oral, Q15 Min PRN, Mustapha Mahan MD    divalproex (DEPAKOTE) DR tablet 125 mg, 125 mg, Oral, Daily, Mustapha Mahan MD    dorzolamide (TRUSOPT) 2 % ophthalmic solution 1 drop, 1 drop, Both Eyes, BID, Msutapha Mahan MD, 1 drop at 12/05/23 2154    glucagon (GLUCAGEN) injection 1 mg, 1 mg, Intramuscular, Q15 Min PRN, Mustapha Mahan MD    HYDROcodone-acetaminophen (NORCO) 5-325 MG per tablet 1 tablet, 1 tablet, Oral, Q6H PRN, Mustapha Mahan MD, 1 tablet at 12/05/23 2249    hydrocortisone-bacitracin-zinc oxide-nystatin (MAGIC BARRIER) ointment 1 application , 1 application , Topical, TID, Mustapha Mahan MD    HYDROmorphone (DILAUDID) injection 0.25 mg, 0.25 mg, Intravenous, Q4H PRN, Mustapha Mahan MD, 0.25 mg at 12/05/23 1737    insulin lispro (HUMALOG/ADMELOG) injection 2-7 Units, 2-7 Units, Subcutaneous, 4x Daily AC & at Bedtime, Mustapha Mahan MD    losartan (COZAAR) tablet 25 mg, 25 mg, Oral, Q24H, Mustapha Mahan MD    nebivolol (BYSTOLIC) tablet 2.5 mg, 2.5 mg, Oral, Q24H, Mustapha Mahan MD, 2.5 mg at 12/06/23 1011    OLANZapine (zyPREXA) tablet 7.5 mg, 7.5 mg, Oral, BID, Mustapha Mahan MD, 7.5 mg at 12/06/23 1011    ondansetron (ZOFRAN) injection 4 mg, 4 mg, Intravenous, Q6H PRN, Mustapha Mahan MD, 4 mg at 12/05/23 1736    pantoprazole (PROTONIX) EC tablet 40 mg, 40 mg, Oral, Q AM, Mustapha Mahan MD, 40 mg at 12/06/23 0516    PARoxetine (PAXIL) tablet 10 mg, 10 mg, Oral, Daily, Mustapha Mahan MD, 10 mg at 12/06/23 1011    sodium chloride 0.9 % flush 10 mL, 10 mL, Intravenous, PRN, Emergency, Triage Protocol, MD    [COMPLETED] Insert Peripheral IV, , , Once **AND** sodium chloride 0.9 % flush 10 mL, 10 mL, Intravenous,  ARAMIS, Alexandre Rosa II, MD    Current Medications:  Scheduled Meds:aspirin, 81 mg, Oral, Daily  brimonidine, 1 drop, Both Eyes, BID  castor oil-balsam peru, 1 application , Topical, Q12H  divalproex, 125 mg, Oral, Daily  dorzolamide, 1 drop, Both Eyes, BID  hydrocortisone-bacitracin-zinc oxide-nystatin, 1 application , Topical, TID  insulin lispro, 2-7 Units, Subcutaneous, 4x Daily AC & at Bedtime  losartan, 25 mg, Oral, Q24H  nebivolol, 2.5 mg, Oral, Q24H  OLANZapine, 7.5 mg, Oral, BID  pantoprazole, 40 mg, Oral, Q AM  PARoxetine, 10 mg, Oral, Daily      Continuous Infusions:   PRN Meds:.  acetaminophen    clonazePAM    dextrose    dextrose    glucagon (human recombinant)    HYDROcodone-acetaminophen    HYDROmorphone    ondansetron    sodium chloride    [COMPLETED] Insert Peripheral IV **AND** sodium chloride    Past Medical History:   Diagnosis Date    Acute cerebrovascular accident (CVA) of cerebellum     Acute ischemic stroke     Arthritis     Coronary artery disease     CVA (cerebral vascular accident)     Diabetes mellitus     Heart attack     History of blood clots     Hyperlipidemia     Hypertension     Vision loss        Past Surgical History:   Procedure Laterality Date    CAROTID ENDARTERECTOMY Left 7/17/2019    Procedure: Left carotid endarterectomy;  Surgeon: Rupert Oliva MD;  Location: [facility] MAIN OR;  Service: Neurosurgery    EMBOLECTOMY Left 7/17/2019    Procedure: CEREBRAL ANGIOGRAM, LEFT INTERNAL CAROTID ARTERY STENT;  Surgeon: Rupert Oliva MD;  Location: [facility] HYBRID OR 18/19;  Service: Neurosurgery    ENDOSCOPY N/A 3/28/2020    Procedure: ESOPHAGOGASTRODUODENOSCOPY AT BEDSIDE WITH EPI INJECTION AND GOLD PROBE CAUTERIZATION;  Surgeon: Malathi Bright MD;  Location: [facility] ENDOSCOPY;  Service: Gastroenterology;  Laterality: N/A;  PRE GI BLEED  POST EROSIVE GASTRITIS, DUODENAL ULCER WITH CLOT       Social History     Occupational History    Occupation: retired   Tobacco Use     Smoking status: Never    Smokeless tobacco: Never   Vaping Use    Vaping Use: Never used   Substance and Sexual Activity    Alcohol use: No    Drug use: No    Sexual activity: Defer      Social History     Social History Narrative    Not on file       Family History   Problem Relation Age of Onset    Arthritis Mother     Heart disease Father     Breast cancer Neg Hx     Malig Hyperthermia Neg Hx        Review of Systems:     Constitutional:  Denies fever, shaking or chills     All other pertinent positives and negatives as noted above in HPI.    Physical Exam: 78 y.o. female    Vitals:    12/05/23 1718 12/05/23 2051 12/06/23 0501 12/06/23 0900   BP:  104/64 117/51 120/63   BP Location:  Right arm Right arm Left arm   Patient Position:  Lying Lying Lying   Pulse: 81 83 87 89   Resp:  16 18 18   Temp:  97.3 °F (36.3 °C) 97.4 °F (36.3 °C) 97.5 °F (36.4 °C)   TempSrc:  Oral Oral Oral   SpO2: 92% 94% 94% 96%   Weight:       Height:         General:  Awake, No acute distress.          Extremities:  as above    All other extremities atraumatic without gross abnormality.     Diagnostic Tests:    Admission on 12/05/2023   Component Date Value Ref Range Status    Glucose 12/05/2023 56 (L)  70 - 130 mg/dL Final    Extra Tube 12/05/2023 Hold for add-ons.   Final    Auto resulted.    Extra Tube 12/05/2023 hold for add-on   Final    Auto resulted    Extra Tube 12/05/2023 Hold for add-ons.   Final    Auto resulted    Glucose 12/05/2023 57 (L)  65 - 99 mg/dL Final    BUN 12/05/2023 18  8 - 23 mg/dL Final    Creatinine 12/05/2023 0.71  0.57 - 1.00 mg/dL Final    Sodium 12/05/2023 135 (L)  136 - 145 mmol/L Final    Potassium 12/05/2023 4.0  3.5 - 5.2 mmol/L Final    Specimen hemolyzed.  Result may be falsely elevated.    Chloride 12/05/2023 98  98 - 107 mmol/L Final    CO2 12/05/2023 29.8 (H)  22.0 - 29.0 mmol/L Final    Calcium 12/05/2023 8.9  8.6 - 10.5 mg/dL Final    BUN/Creatinine Ratio 12/05/2023 25.4 (H)  7.0 - 25.0 Final     Anion Gap 12/05/2023 7.2  5.0 - 15.0 mmol/L Final    eGFR 12/05/2023 86.6  >60.0 mL/min/1.73 Final    WBC 12/05/2023 8.95  3.40 - 10.80 10*3/mm3 Final    RBC 12/05/2023 4.07  3.77 - 5.28 10*6/mm3 Final    Hemoglobin 12/05/2023 11.8 (L)  12.0 - 15.9 g/dL Final    Hematocrit 12/05/2023 36.3  34.0 - 46.6 % Final    MCV 12/05/2023 89.2  79.0 - 97.0 fL Final    MCH 12/05/2023 29.0  26.6 - 33.0 pg Final    MCHC 12/05/2023 32.5  31.5 - 35.7 g/dL Final    RDW 12/05/2023 14.2  12.3 - 15.4 % Final    RDW-SD 12/05/2023 46.1  37.0 - 54.0 fl Final    MPV 12/05/2023 9.1  6.0 - 12.0 fL Final    Platelets 12/05/2023 270  140 - 450 10*3/mm3 Final    Neutrophil % 12/05/2023 77.8 (H)  42.7 - 76.0 % Final    Lymphocyte % 12/05/2023 13.3 (L)  19.6 - 45.3 % Final    Monocyte % 12/05/2023 6.6  5.0 - 12.0 % Final    Eosinophil % 12/05/2023 1.5  0.3 - 6.2 % Final    Basophil % 12/05/2023 0.4  0.0 - 1.5 % Final    Immature Grans % 12/05/2023 0.4  0.0 - 0.5 % Final    Neutrophils, Absolute 12/05/2023 6.96  1.70 - 7.00 10*3/mm3 Final    Lymphocytes, Absolute 12/05/2023 1.19  0.70 - 3.10 10*3/mm3 Final    Monocytes, Absolute 12/05/2023 0.59  0.10 - 0.90 10*3/mm3 Final    Eosinophils, Absolute 12/05/2023 0.13  0.00 - 0.40 10*3/mm3 Final    Basophils, Absolute 12/05/2023 0.04  0.00 - 0.20 10*3/mm3 Final    Immature Grans, Absolute 12/05/2023 0.04  0.00 - 0.05 10*3/mm3 Final    nRBC 12/05/2023 0.0  0.0 - 0.2 /100 WBC Final    Glucose 12/05/2023 126  70 - 130 mg/dL Final    proBNP 12/05/2023 517.0  0.0 - 1,800.0 pg/mL Final    Glucose 12/05/2023 62 (L)  70 - 130 mg/dL Final    TSH 12/06/2023 1.020  0.270 - 4.200 uIU/mL Final    Total Cholesterol 12/06/2023 125  0 - 200 mg/dL Final    Triglycerides 12/06/2023 70  0 - 150 mg/dL Final    HDL Cholesterol 12/06/2023 34 (L)  40 - 60 mg/dL Final    LDL Cholesterol  12/06/2023 77  0 - 100 mg/dL Final    VLDL Cholesterol 12/06/2023 14  5 - 40 mg/dL Final    LDL/HDL Ratio 12/06/2023 2.26   Final     Hemoglobin A1C 12/06/2023 9.70 (H)  4.80 - 5.60 % Final    WBC 12/06/2023 7.58  3.40 - 10.80 10*3/mm3 Final    RBC 12/06/2023 4.05  3.77 - 5.28 10*6/mm3 Final    Hemoglobin 12/06/2023 11.8 (L)  12.0 - 15.9 g/dL Final    Hematocrit 12/06/2023 35.9  34.0 - 46.6 % Final    MCV 12/06/2023 88.6  79.0 - 97.0 fL Final    MCH 12/06/2023 29.1  26.6 - 33.0 pg Final    MCHC 12/06/2023 32.9  31.5 - 35.7 g/dL Final    RDW 12/06/2023 13.7  12.3 - 15.4 % Final    RDW-SD 12/06/2023 44.4  37.0 - 54.0 fl Final    MPV 12/06/2023 9.4  6.0 - 12.0 fL Final    Platelets 12/06/2023 220  140 - 450 10*3/mm3 Final    Neutrophil % 12/06/2023 64.2  42.7 - 76.0 % Final    Lymphocyte % 12/06/2023 20.6  19.6 - 45.3 % Final    Monocyte % 12/06/2023 7.3  5.0 - 12.0 % Final    Eosinophil % 12/06/2023 5.7  0.3 - 6.2 % Final    Basophil % 12/06/2023 0.9  0.0 - 1.5 % Final    Immature Grans % 12/06/2023 1.3 (H)  0.0 - 0.5 % Final    Neutrophils, Absolute 12/06/2023 4.87  1.70 - 7.00 10*3/mm3 Final    Lymphocytes, Absolute 12/06/2023 1.56  0.70 - 3.10 10*3/mm3 Final    Monocytes, Absolute 12/06/2023 0.55  0.10 - 0.90 10*3/mm3 Final    Eosinophils, Absolute 12/06/2023 0.43 (H)  0.00 - 0.40 10*3/mm3 Final    Basophils, Absolute 12/06/2023 0.07  0.00 - 0.20 10*3/mm3 Final    Immature Grans, Absolute 12/06/2023 0.10 (H)  0.00 - 0.05 10*3/mm3 Final    nRBC 12/06/2023 0.1  0.0 - 0.2 /100 WBC Final    Glucose 12/05/2023 65 (L)  70 - 130 mg/dL Final    Glucose 12/05/2023 210 (H)  70 - 130 mg/dL Final    Glucose 12/06/2023 173 (H)  70 - 130 mg/dL Final    Glucose 12/06/2023 120  70 - 130 mg/dL Final     Lab Results (last 24 hours)       Procedure Component Value Units Date/Time    POC Glucose Once [[id number]]  (Normal) Collected: 12/06/23 0729    Specimen: Blood Updated: 12/06/23 0730     Glucose 120 mg/dL     Hemoglobin A1c [[id number]]  (Abnormal) Collected: 12/06/23 0359    Specimen: Blood Updated: 12/06/23 0510     Hemoglobin A1C 9.70 %     Narrative:       Hemoglobin A1C Ranges:    Increased Risk for Diabetes  5.7% to 6.4%  Diabetes                     >= 6.5%  Diabetic Goal                < 7.0%    POC Glucose Once [[id number]]  (Abnormal) Collected: 12/06/23 0459    Specimen: Blood Updated: 12/06/23 0500     Glucose 173 mg/dL     Lipid Panel [[id number]]  (Abnormal) Collected: 12/06/23 0320    Specimen: Blood Updated: 12/06/23 0401     Total Cholesterol 125 mg/dL      Triglycerides 70 mg/dL      HDL Cholesterol 34 mg/dL      LDL Cholesterol  77 mg/dL      VLDL Cholesterol 14 mg/dL      LDL/HDL Ratio 2.26    Narrative:      Cholesterol Reference Ranges  (U.S. Department of Health and Human Services ATP III Classifications)    Desirable          <200 mg/dL  Borderline High    200-239 mg/dL  High Risk          >240 mg/dL      Triglyceride Reference Ranges  (U.S. Department of Health and Human Services ATP III Classifications)    Normal           <150 mg/dL  Borderline High  150-199 mg/dL  High             200-499 mg/dL  Very High        >500 mg/dL    HDL Reference Ranges  (U.S. Department of Health and Human Services ATP III Classifications)    Low     <40 mg/dl (major risk factor for CHD)  High    >60 mg/dl ('negative' risk factor for CHD)        LDL Reference Ranges  (U.S. Department of Health and Human Services ATP III Classifications)    Optimal          <100 mg/dL  Near Optimal     100-129 mg/dL  Borderline High  130-159 mg/dL  High             160-189 mg/dL  Very High        >189 mg/dL    Comprehensive Metabolic Panel [[id number]] Updated: 12/06/23 0401    Specimen: Blood     TSH [[id number]]  (Normal) Collected: 12/06/23 0320    Specimen: Blood Updated: 12/06/23 0359     TSH 1.020 uIU/mL     CBC & Differential [[id number]]  (Abnormal) Collected: 12/06/23 0335    Specimen: Blood Updated: 12/06/23 0342    Narrative:      The following orders were created for panel order CBC & Differential.  Procedure                               Abnormality         Status                      ---------                               -----------         ------                     CBC Auto Differential[[id number]]        Abnormal            Final result                 Please view results for these tests on the individual orders.    CBC Auto Differential [[id number]]  (Abnormal) Collected: 12/06/23 0335    Specimen: Blood Updated: 12/06/23 0342     WBC 7.58 10*3/mm3      RBC 4.05 10*6/mm3      Hemoglobin 11.8 g/dL      Hematocrit 35.9 %      MCV 88.6 fL      MCH 29.1 pg      MCHC 32.9 g/dL      RDW 13.7 %      RDW-SD 44.4 fl      MPV 9.4 fL      Platelets 220 10*3/mm3      Neutrophil % 64.2 %      Lymphocyte % 20.6 %      Monocyte % 7.3 %      Eosinophil % 5.7 %      Basophil % 0.9 %      Immature Grans % 1.3 %      Neutrophils, Absolute 4.87 10*3/mm3      Lymphocytes, Absolute 1.56 10*3/mm3      Monocytes, Absolute 0.55 10*3/mm3      Eosinophils, Absolute 0.43 10*3/mm3      Basophils, Absolute 0.07 10*3/mm3      Immature Grans, Absolute 0.10 10*3/mm3      nRBC 0.1 /100 WBC     POC Glucose Once [[id number]]  (Abnormal) Collected: 12/05/23 2328    Specimen: Blood Updated: 12/05/23 2330     Glucose 210 mg/dL     POC Glucose Once [[id number]]  (Abnormal) Collected: 12/05/23 2238    Specimen: Blood Updated: 12/05/23 2239     Glucose 65 mg/dL     POC Glucose Once [[id number]]  (Abnormal) Collected: 12/05/23 2028    Specimen: Blood Updated: 12/05/23 2029     Glucose 62 mg/dL     BNP [[id number]]  (Normal) Collected: 12/05/23 1232    Specimen: Blood Updated: 12/05/23 1527     proBNP 517.0 pg/mL     Narrative:      This assay is used as an aid in the diagnosis of individuals suspected of having heart failure. It can be used as an aid in the diagnosis of acute decompensated heart failure (ADHF) in patients presenting with signs and symptoms of ADHF to the emergency department (ED). In addition, NT-proBNP of <300 pg/mL indicates ADHF is not likely.    Age Range Result Interpretation  NT-proBNP Concentration  (pg/mL:      <50             Positive            >450                   Gray                 300-450                    Negative             <300    50-75           Positive            >900                  Gray                300-900                  Negative            <300      >75             Positive            >1800                  Gray                300-1800                  Negative            <300    POC Glucose Once [[id number]]  (Normal) Collected: 12/05/23 1416    Specimen: Blood Updated: 12/05/23 1417     Glucose 126 mg/dL     [place] Draw [[id number]] Collected: 12/05/23 1232    Specimen: Blood Updated: 12/05/23 1345    Narrative:      The following orders were created for panel order [place] Draw.  Procedure                               Abnormality         Status                     ---------                               -----------         ------                     Green Top (Gel)[[id number]]                                  Final result               Lavender Top[[id number]]                                     Final result               Light Blue Top[[id number]]                                   Final result                 Please view results for these tests on the individual orders.    Green Top (Gel) [[id number]] Collected: 12/05/23 1232    Specimen: Blood Updated: 12/05/23 1345     Extra Tube Hold for add-ons.     Comment: Auto resulted.       Lavender Top [[id number]] Collected: 12/05/23 1232    Specimen: Blood Updated: 12/05/23 1345     Extra Tube hold for add-on     Comment: Auto resulted       Light Blue Top [[id number]] Collected: 12/05/23 1232    Specimen: Blood Updated: 12/05/23 1345     Extra Tube Hold for add-ons.     Comment: Auto resulted       Basic Metabolic Panel [[id number]]  (Abnormal) Collected: 12/05/23 1232    Specimen: Blood Updated: 12/05/23 1342     Glucose 57 mg/dL      BUN 18 mg/dL      Creatinine 0.71 mg/dL      Sodium 135 mmol/L      Potassium 4.0 mmol/L      Comment:  Specimen hemolyzed.  Result may be falsely elevated.        Chloride 98 mmol/L      CO2 29.8 mmol/L      Calcium 8.9 mg/dL      BUN/Creatinine Ratio 25.4     Anion Gap 7.2 mmol/L      eGFR 86.6 mL/min/1.73     Narrative:      GFR Normal >60  Chronic Kidney Disease <60  Kidney Failure <15    The GFR formula is only valid for adults with stable renal function between ages 18 and 70.    CBC & Differential [[id number]]  (Abnormal) Collected: 12/05/23 1232    Specimen: Blood Updated: 12/05/23 1258    Narrative:      The following orders were created for panel order CBC & Differential.  Procedure                               Abnormality         Status                     ---------                               -----------         ------                     CBC Auto Differential[[id number]]        Abnormal            Final result                 Please view results for these tests on the individual orders.    CBC Auto Differential [[id number]]  (Abnormal) Collected: 12/05/23 1232    Specimen: Blood Updated: 12/05/23 1258     WBC 8.95 10*3/mm3      RBC 4.07 10*6/mm3      Hemoglobin 11.8 g/dL      Hematocrit 36.3 %      MCV 89.2 fL      MCH 29.0 pg      MCHC 32.5 g/dL      RDW 14.2 %      RDW-SD 46.1 fl      MPV 9.1 fL      Platelets 270 10*3/mm3      Neutrophil % 77.8 %      Lymphocyte % 13.3 %      Monocyte % 6.6 %      Eosinophil % 1.5 %      Basophil % 0.4 %      Immature Grans % 0.4 %      Neutrophils, Absolute 6.96 10*3/mm3      Lymphocytes, Absolute 1.19 10*3/mm3      Monocytes, Absolute 0.59 10*3/mm3      Eosinophils, Absolute 0.13 10*3/mm3      Basophils, Absolute 0.04 10*3/mm3      Immature Grans, Absolute 0.04 10*3/mm3      nRBC 0.0 /100 WBC     POC Glucose Once [[id number]]  (Abnormal) Collected: 12/05/23 1224    Specimen: Blood Updated: 12/05/23 1226     Glucose 56 mg/dL             Imaging: X-RAY RIGHT ANKLE     HISTORY: Ankle pain. Fracture.     3 x-rays of the right ankle are correlated with lower leg x-rays  from  earlier today.     FINDINGS: The bones are diffusely demineralized. There are fractures of  the distal tibia and fibula extending obliquely across the distal tibial  diametaphysis with minimal impaction and obliquely across the distal  fibular diametaphysis without displacement or impaction. There is  diffuse soft tissue edema.     The visualized bones in the foot are intact.     IMPRESSION:  The bones appear profoundly demineralized and there are  acute appearing distal right tibia and fibula diametaphysis fractures.    Assessment: Right distal tibia/fibula fracture    Plan: At this time given the patient's current functional status which she is mainly bedbound as well as progressed dementia we will recommend conservative treatment at this time.  She is very poor bone quality and this would likely need a plate and screw fixation in which I am not sure the benefits would outweigh the risks especially given her functional status as noted above.  Magaly has a pre-existing flexion contracture.  We will plan the splint however that is not tolerated we will put her in a boot and monitor.  If fracture displaces or there becomes a issue with immobilization and surgical intervention could be warranted however we will try to avoid that if we can.  Plan for immobilization treatment for likely 8 weeks.  Recommend vitamin D for bone healing health.  Ice and elevation.  Plan to have the patient follow-up as an outpatient here in a few weeks.  Please call with any questions or concerns thank you    Date: 12/6/2023    Saida Leon RN    CC: Mustapha Mahan MD

## 2023-12-06 NOTE — DISCHARGE PLACEMENT REQUEST
"Noman Teixeira (78 y.o. Female)       Date of Birth   1945    Social Security Number       Address   [address] [place] [address]    Home Phone   [phone]    MRN   [id number]       Sikhism   Mandaeism    Marital Status                               Admission Date   12/5/23    Admission Type   Emergency    Admitting Provider   Mustapha Mahan MD    Attending Provider   Mustapha Mahan MD    Department, Room/Bed   [facility] [address] [place], P884/1       Discharge Date       Discharge Disposition       Discharge Destination                                 Attending Provider: Mustapha Mahan MD    Allergies: No Known Allergies    Isolation: Contact   Infection: MRSA (10/20/22), Candida Auris (rule out) (12/06/23)   Code Status: CPR    Ht: 165.1 cm (65\")   Wt: 74.4 kg (164 lb)    Admission Cmt: None   Principal Problem: Closed fracture of right distal tibia [S82.301A]                   Active Insurance as of 12/5/2023       Primary Coverage       Payor Plan Insurance Group Employer/Plan Group    AETNA MEDICARE REPLACEMENT AETNA MEDICARE REPLACEMENT [id number]       Payor Plan Address Payor Plan Phone Number Payor Plan Fax Number Effective Dates    [address] [phone]  11/1/2023 - None Entered    [place] [address]         Subscriber Name Subscriber Birth Date Member ID       NOMAN TEIXEIRA 1945 [id number]               Secondary Coverage       Payor Plan Insurance Group Employer/Plan Group    [place]        Payor Plan Address Payor Plan Phone Number Payor Plan Fax Number Effective Dates    [address] [phone]  10/1/2019 - None Entered    [facility] [address]         Subscriber Name Subscriber Birth Date Member ID       NOMAN TEIXEIRA 1945 [id number]                     Emergency Contacts        (Rel.) Home Phone Work Phone Mobile Phone    JiAjit galicia (Spouse) [phone]    Katerine Teixeira " (Phgvbixa) [phone]

## 2023-12-06 NOTE — NURSING NOTE
12/06/23 0949   Wound 12/05/23 1954 Bilateral gluteal   Placement Date/Time: 12/05/23 1954   Present on Original Admission: Yes  Side: Bilateral  Location: gluteal   Pressure Injury Stage 2   Base   (scattered pink partial thickness skin loss to kamala buttocks)   Periwound intact;blanchable;yeast;pink   Periwound Temperature warm   Drainage Characteristics/Odor serosanguineous   Drainage Amount scant   Care, Wound   (peeled Optifoam dressing back to assess the skin.)   Skin Interventions   Pressure Reduction Devices pressure-redistributing mattress utilized   Pressure Reduction Techniques pressure points protected;positioned off wounds;heels elevated off bed   Skin Protection silicone foam dressing in place     CWON note: pt seen for evaluation of skin issues POA. Pt is confused and uncooperative at times, difficult to turn and reposition due to bulky splint on RLE/ pain associated with this. She is incontinent of bowel and bladder. I have ordered pt a TIN mattress for adequate pressure redistribution and to assist with control of microclimate. Sacral skin as stated above; in addition, she has IAD with yeast component. Magic Barrier cream has been ordered to alternate with zinc barrier cream, do not use cream under the Optifoam dressing. Left heel is boggy with slowly blanchable erythema. Venelex ointment and heel boot have been ordered. Wound care and prevention standing orders have been placed into [facility]. Discussed with RN. Please re-consult for any additional needs.

## 2023-12-06 NOTE — PROGRESS NOTES
"Daily progress note    Primary care physician      Subjective  Doing same with no new issues but remained pleasantly confused    History of present illness  78-year-old white female who is well-known to our service with history of severe dementia CVA diabetes hypertension hyperlipidemia and coronary artery disease who is a nursing home resident brought to the emergency room and found to have right tibia-fibula fracture.  Patient is demented unable to give detailed history and most of the history obtained from the chart nursing staff old record and I know the patient from previous admission.  Patient denies any fall trauma injury but hurting at the right lower extremity more than usual for last few days.     REVIEW OF SYSTEMS  All systems reviewed and negative except for those discussed in HPI.      PHYSICAL EXAM  Blood pressure 120/63, pulse 89, temperature 97.5 °F (36.4 °C), temperature source Oral, resp. rate 18, height 165.1 cm (65\"), weight 74.4 kg (164 lb), SpO2 96%.    GENERAL: Awake and alert no acute distress  HENT: nares patent, scalp is atraumatic  EYES: no scleral icterus  CV: regular rhythm, normal rate, 2+ edema to the feet bilaterally  RESPIRATORY: normal effort, clear to auscultation bilaterally  ABDOMEN: soft, nontender nondistended bowel sounds positive  MUSCULOSKELETAL: Tenderness to the right tib-fib, no pain to the left leg  NEURO: alert, moves all extremities, follows commands  PSYCH:  calm, cooperative  SKIN: warm, dry     LAB RESULTS  Lab Results (last 24 hours)       Procedure Component Value Units Date/Time    CANDIDA AURIS SCREEN - Swab, Axilla Right, Axilla Left and Groin [[id number]] Collected: 12/06/23 1158    Specimen: Swab from Axilla Right, Axilla Left and Groin Updated: 12/06/23 1225    POC Glucose Once [[id number]]  (Normal) Collected: 12/06/23 1103    Specimen: Blood Updated: 12/06/23 1105     Glucose 105 mg/dL     POC Glucose Once [[id number]]  (Normal) Collected: 12/06/23 " 0729    Specimen: Blood Updated: 12/06/23 0730     Glucose 120 mg/dL     Hemoglobin A1c [[id number]]  (Abnormal) Collected: 12/06/23 0359    Specimen: Blood Updated: 12/06/23 0510     Hemoglobin A1C 9.70 %     Narrative:      Hemoglobin A1C Ranges:    Increased Risk for Diabetes  5.7% to 6.4%  Diabetes                     >= 6.5%  Diabetic Goal                < 7.0%    POC Glucose Once [[id number]]  (Abnormal) Collected: 12/06/23 0459    Specimen: Blood Updated: 12/06/23 0500     Glucose 173 mg/dL     Lipid Panel [[id number]]  (Abnormal) Collected: 12/06/23 0320    Specimen: Blood Updated: 12/06/23 0401     Total Cholesterol 125 mg/dL      Triglycerides 70 mg/dL      HDL Cholesterol 34 mg/dL      LDL Cholesterol  77 mg/dL      VLDL Cholesterol 14 mg/dL      LDL/HDL Ratio 2.26    Narrative:      Cholesterol Reference Ranges  ([place] Department of Health and Human Services ATP III Classifications)    Desirable          <200 mg/dL  Borderline High    200-239 mg/dL  High Risk          >240 mg/dL      Triglyceride Reference Ranges  (U.S. Department of Health and Human Services ATP III Classifications)    Normal           <150 mg/dL  Borderline High  150-199 mg/dL  High             200-499 mg/dL  Very High        >500 mg/dL    HDL Reference Ranges  (U.S. Department of Health and Human Services ATP III Classifications)    Low     <40 mg/dl (major risk factor for CHD)  High    >60 mg/dl ('negative' risk factor for CHD)        LDL Reference Ranges  ([place].S. Department of Health and Human Services ATP III Classifications)    Optimal          <100 mg/dL  Near Optimal     100-129 mg/dL  Borderline High  130-159 mg/dL  High             160-189 mg/dL  Very High        >189 mg/dL    TSH [[id number]]  (Normal) Collected: 12/06/23 0320    Specimen: Blood Updated: 12/06/23 0359     TSH 1.020 uIU/mL     CBC & Differential [[id number]]  (Abnormal) Collected: 12/06/23 0335    Specimen: Blood Updated: 12/06/23 0342    Narrative:      The following  orders were created for panel order CBC & Differential.  Procedure                               Abnormality         Status                     ---------                               -----------         ------                     CBC Auto Differential[[id number]]        Abnormal            Final result                 Please view results for these tests on the individual orders.    CBC Auto Differential [[id number]]  (Abnormal) Collected: 12/06/23 0335    Specimen: Blood Updated: 12/06/23 0342     WBC 7.58 10*3/mm3      RBC 4.05 10*6/mm3      Hemoglobin 11.8 g/dL      Hematocrit 35.9 %      MCV 88.6 fL      MCH 29.1 pg      MCHC 32.9 g/dL      RDW 13.7 %      RDW-SD 44.4 fl      MPV 9.4 fL      Platelets 220 10*3/mm3      Neutrophil % 64.2 %      Lymphocyte % 20.6 %      Monocyte % 7.3 %      Eosinophil % 5.7 %      Basophil % 0.9 %      Immature Grans % 1.3 %      Neutrophils, Absolute 4.87 10*3/mm3      Lymphocytes, Absolute 1.56 10*3/mm3      Monocytes, Absolute 0.55 10*3/mm3      Eosinophils, Absolute 0.43 10*3/mm3      Basophils, Absolute 0.07 10*3/mm3      Immature Grans, Absolute 0.10 10*3/mm3      nRBC 0.1 /100 WBC     POC Glucose Once [[id number]]  (Abnormal) Collected: 12/05/23 2328    Specimen: Blood Updated: 12/05/23 2330     Glucose 210 mg/dL     POC Glucose Once [[id number]]  (Abnormal) Collected: 12/05/23 2238    Specimen: Blood Updated: 12/05/23 2239     Glucose 65 mg/dL     POC Glucose Once [[id number]]  (Abnormal) Collected: 12/05/23 2028    Specimen: Blood Updated: 12/05/23 2029     Glucose 62 mg/dL     BNP [[id number]]  (Normal) Collected: 12/05/23 1232    Specimen: Blood Updated: 12/05/23 1527     proBNP 517.0 pg/mL     Narrative:      This assay is used as an aid in the diagnosis of individuals suspected of having heart failure. It can be used as an aid in the diagnosis of acute decompensated heart failure (ADHF) in patients presenting with signs and symptoms of ADHF to the emergency department  (ED). In addition, NT-proBNP of <300 pg/mL indicates ADHF is not likely.    Age Range Result Interpretation  NT-proBNP Concentration (pg/mL:      <50             Positive            >450                   Gray                 300-450                    Negative             <300    50-75           Positive            >900                  Gray                300-900                  Negative            <300      >75             Positive            >1800                  Gray                300-1800                  Negative            <300    POC Glucose Once [[id number]]  (Normal) Collected: 12/05/23 1416    Specimen: Blood Updated: 12/05/23 1417     Glucose 126 mg/dL           Imaging Results (Last 24 Hours)       Procedure Component Value Units Date/Time    CT Lower Extremity Right Without Contrast [[id number]] Collected: 12/05/23 2101     Updated: 12/05/23 2108    Narrative:      NONCONTRAST CT RIGHT LOWER LEG     HISTORY: Evaluate fractures. Pain.     TECHNIQUE: Noncontrast CT of the right lower leg is provided and  correlated with x-rays from earlier today.     FINDINGS: There is diffuse soft tissue edema. The bones appear  demineralized.     Obliquely oriented distal tibial diametaphysis fracture is minimally  impacted. There is also a nondisplaced oblique distal fibular  diametaphysis fracture. No evidence of underlying bone lesion. No  intra-articular extension.     No other fracture identified. Musculature of the leg is deconditioned  but there is no focal mass lesion or hematoma.     Radiation dose reduction techniques were utilized, including automated  exposure control and exposure modulation based on body size.       Impression:      Diffuse demineralization with distal right tibia and fibular  fractures similar as observed on x-ray earlier in the day. No  intra-articular extension.     This report was finalized on 12/5/2023 9:05 PM by Dr. Ajit Day M.D on Workstation: [id number]       CT Pelvis  Without Contrast [[id number]] Collected: 12/05/23 1731     Updated: 12/05/23 1819    Narrative:      PELVIS CT WITHOUT CONTRAST     HISTORY: Right hip pain. Evaluate for fracture.     TECHNIQUE: Axial CT of the pelvis and proximal femurs is provided.  Correlation with x-rays from earlier today.        Radiation dose reduction techniques were utilized, including automated  exposure control and exposure modulation based on body size.     FINDINGS: The bones are diffusely demineralized. Advanced degenerative  disc change is observed at L4-5 and L5-S1 with no evidence of fracture  in the visualized lower lumbar spine, pelvis, or the proximal femurs.     There is asymmetric soft tissue edema partially visualized along the  lateral right thigh in the mid thigh region. No focal hematoma or muscle  injury is evident. Musculature around the pelvis and thighs is diffusely  deconditioned.     There is mild, chronic joint space narrowing at both hips and mild  degenerative change at the sacroiliac joints and symphysis pubis. No  intrapelvic free fluid or lesion.       Impression:      No acute fracture identified around the pelvis or the  proximal femurs. There is asymmetric soft tissue edema in the right  thigh.      This report was finalized on 12/5/2023 6:16 PM by Dr. Ajit Day M.D on Workstation: [id number]       XR Ankle 3+ View Right [[id number]] Collected: 12/05/23 1508     Updated: 12/05/23 1644    Narrative:      X-RAY RIGHT ANKLE     HISTORY: Ankle pain. Fracture.     3 x-rays of the right ankle are correlated with lower leg x-rays from  earlier today.     FINDINGS: The bones are diffusely demineralized. There are fractures of  the distal tibia and fibula extending obliquely across the distal tibial  diametaphysis with minimal impaction and obliquely across the distal  fibular diametaphysis without displacement or impaction. There is  diffuse soft tissue edema.     The visualized bones in the foot are intact.        Impression:      The bones appear profoundly demineralized and there are  acute appearing distal right tibia and fibula diametaphysis fractures.     This report was finalized on 12/5/2023 4:41 PM by Dr. Ajit Day M.D on Workstation: [id number]             ASSESSMENT  Right distal tibia/fibula fracture  Probable right femoral neck fracture  Severe dementia  Insulin-dependent diabetes mellitus  Hypertension  Hyperlipidemia  History of CVA  Gastroesophageal reflux disease    PLAN  CPM  Bedrest  Pain management  Orthopedic surgery consult pending  Adjust nursing home medications  Stress ulcer DVT prophylaxis   Supportive care  Discussed with nursing staff  Follow closely further recommendation current hospital course    ISABELL SANDHU MD    Copied text in this note has been reviewed and is accurate as of 12/06/23

## 2023-12-07 LAB
ANION GAP SERPL CALCULATED.3IONS-SCNC: 8.4 MMOL/L (ref 5–15)
BASOPHILS # BLD AUTO: 0.04 10*3/MM3 (ref 0–0.2)
BASOPHILS NFR BLD AUTO: 0.5 % (ref 0–1.5)
BUN SERPL-MCNC: 15 MG/DL (ref 8–23)
BUN/CREAT SERPL: 18.1 (ref 7–25)
CALCIUM SPEC-SCNC: 8.2 MG/DL (ref 8.6–10.5)
CHLORIDE SERPL-SCNC: 102 MMOL/L (ref 98–107)
CO2 SERPL-SCNC: 24.6 MMOL/L (ref 22–29)
CREAT SERPL-MCNC: 0.83 MG/DL (ref 0.57–1)
DEPRECATED RDW RBC AUTO: 43.5 FL (ref 37–54)
EGFRCR SERPLBLD CKD-EPI 2021: 71.8 ML/MIN/1.73
EOSINOPHIL # BLD AUTO: 0.25 10*3/MM3 (ref 0–0.4)
EOSINOPHIL NFR BLD AUTO: 3.2 % (ref 0.3–6.2)
ERYTHROCYTE [DISTWIDTH] IN BLOOD BY AUTOMATED COUNT: 13.7 % (ref 12.3–15.4)
GLUCOSE BLDC GLUCOMTR-MCNC: 151 MG/DL (ref 70–130)
GLUCOSE BLDC GLUCOMTR-MCNC: 194 MG/DL (ref 70–130)
GLUCOSE BLDC GLUCOMTR-MCNC: 291 MG/DL (ref 70–130)
GLUCOSE BLDC GLUCOMTR-MCNC: 377 MG/DL (ref 70–130)
GLUCOSE SERPL-MCNC: 138 MG/DL (ref 65–99)
HCT VFR BLD AUTO: 35.4 % (ref 34–46.6)
HGB BLD-MCNC: 11.7 G/DL (ref 12–15.9)
IMM GRANULOCYTES # BLD AUTO: 0.05 10*3/MM3 (ref 0–0.05)
IMM GRANULOCYTES NFR BLD AUTO: 0.6 % (ref 0–0.5)
LYMPHOCYTES # BLD AUTO: 1.08 10*3/MM3 (ref 0.7–3.1)
LYMPHOCYTES NFR BLD AUTO: 13.7 % (ref 19.6–45.3)
MCH RBC QN AUTO: 29.3 PG (ref 26.6–33)
MCHC RBC AUTO-ENTMCNC: 33.1 G/DL (ref 31.5–35.7)
MCV RBC AUTO: 88.5 FL (ref 79–97)
MONOCYTES # BLD AUTO: 0.47 10*3/MM3 (ref 0.1–0.9)
MONOCYTES NFR BLD AUTO: 6 % (ref 5–12)
NEUTROPHILS NFR BLD AUTO: 5.97 10*3/MM3 (ref 1.7–7)
NEUTROPHILS NFR BLD AUTO: 76 % (ref 42.7–76)
NRBC BLD AUTO-RTO: 0 /100 WBC (ref 0–0.2)
PLATELET # BLD AUTO: 244 10*3/MM3 (ref 140–450)
PMV BLD AUTO: 9.4 FL (ref 6–12)
POTASSIUM SERPL-SCNC: 3.6 MMOL/L (ref 3.5–5.2)
RBC # BLD AUTO: 4 10*6/MM3 (ref 3.77–5.28)
SODIUM SERPL-SCNC: 135 MMOL/L (ref 136–145)
WBC NRBC COR # BLD AUTO: 7.86 10*3/MM3 (ref 3.4–10.8)

## 2023-12-07 PROCEDURE — 80048 BASIC METABOLIC PNL TOTAL CA: CPT | Performed by: HOSPITALIST

## 2023-12-07 PROCEDURE — L4361 PNEUMA/VAC WALK BOOT PRE OTS: HCPCS

## 2023-12-07 PROCEDURE — 82948 REAGENT STRIP/BLOOD GLUCOSE: CPT

## 2023-12-07 PROCEDURE — 85025 COMPLETE CBC W/AUTO DIFF WBC: CPT | Performed by: HOSPITALIST

## 2023-12-07 PROCEDURE — 25010000002 HYDROMORPHONE PER 4 MG: Performed by: HOSPITALIST

## 2023-12-07 PROCEDURE — 63710000001 INSULIN LISPRO (HUMAN) PER 5 UNITS: Performed by: HOSPITALIST

## 2023-12-07 RX ADMIN — ZINC OXIDE 1 APPLICATION: 200 OINTMENT TOPICAL at 08:01

## 2023-12-07 RX ADMIN — ZINC OXIDE 1 APPLICATION: 200 OINTMENT TOPICAL at 21:26

## 2023-12-07 RX ADMIN — HYDROMORPHONE HYDROCHLORIDE 0.25 MG: 1 INJECTION, SOLUTION INTRAMUSCULAR; INTRAVENOUS; SUBCUTANEOUS at 21:47

## 2023-12-07 RX ADMIN — PANTOPRAZOLE SODIUM 40 MG: 40 TABLET, DELAYED RELEASE ORAL at 08:06

## 2023-12-07 RX ADMIN — OLANZAPINE 7.5 MG: 7.5 TABLET, FILM COATED ORAL at 08:01

## 2023-12-07 RX ADMIN — NEBIVOLOL 2.5 MG: 5 TABLET ORAL at 08:00

## 2023-12-07 RX ADMIN — PAROXETINE HYDROCHLORIDE 10 MG: 10 TABLET, FILM COATED ORAL at 08:00

## 2023-12-07 RX ADMIN — INSULIN LISPRO 2 UNITS: 100 INJECTION, SOLUTION INTRAVENOUS; SUBCUTANEOUS at 16:51

## 2023-12-07 RX ADMIN — DORZOLAMIDE HYDROCHLORIDE 1 DROP: 20 SOLUTION/ DROPS OPHTHALMIC at 08:00

## 2023-12-07 RX ADMIN — DIVALPROEX SODIUM 125 MG: 125 TABLET, DELAYED RELEASE ORAL at 08:00

## 2023-12-07 RX ADMIN — ASPIRIN 81 MG: 81 TABLET, CHEWABLE ORAL at 08:00

## 2023-12-07 RX ADMIN — BRIMONIDINE TARTRATE 1 DROP: 2 SOLUTION OPHTHALMIC at 08:00

## 2023-12-07 RX ADMIN — INSULIN LISPRO 6 UNITS: 100 INJECTION, SOLUTION INTRAVENOUS; SUBCUTANEOUS at 12:15

## 2023-12-07 RX ADMIN — CASTOR OIL AND BALSAM, PERU 1 APPLICATION: 788; 87 OINTMENT TOPICAL at 21:26

## 2023-12-07 RX ADMIN — CASTOR OIL AND BALSAM, PERU 1 APPLICATION: 788; 87 OINTMENT TOPICAL at 08:07

## 2023-12-07 RX ADMIN — INSULIN LISPRO 4 UNITS: 100 INJECTION, SOLUTION INTRAVENOUS; SUBCUTANEOUS at 07:59

## 2023-12-07 RX ADMIN — HYDROMORPHONE HYDROCHLORIDE 0.25 MG: 1 INJECTION, SOLUTION INTRAMUSCULAR; INTRAVENOUS; SUBCUTANEOUS at 13:47

## 2023-12-07 RX ADMIN — LOSARTAN POTASSIUM 25 MG: 25 TABLET, FILM COATED ORAL at 08:00

## 2023-12-07 RX ADMIN — ZINC OXIDE 1 APPLICATION: 200 OINTMENT TOPICAL at 16:51

## 2023-12-07 NOTE — PAYOR COMM NOTE
"Noman Teixeira (78 y.o. Female)        PLEASE SEE ATTACHED FOR INPT AUTH.     REF#[id number]    PLEASE CALL [phone]  OR  [phone]    THANK YOU    DARNELL JACKSON LPN CCP   Date of Birth   1945    Social Security Number       Address   [address] [facility] [address]    Home Phone   [phone]    MRN   [id number]       Orthodoxy   Sabianism    Marital Status                               Admission Date   12/5/23    Admission Type   Emergency    Admitting Provider   Mustapha Mahan MD    Attending Provider   Mustapha Mahan MD    Department, Room/Bed   [facility] [address] [place], P884/1       Discharge Date       Discharge Disposition       Discharge Destination                                 Attending Provider: Mustapha Mahan MD    Allergies: No Known Allergies    Isolation: Contact   Infection: MRSA (10/20/22), Candida Auris (rule out) (12/06/23)   Code Status: CPR    Ht: 165.1 cm (65\")   Wt: 74.4 kg (164 lb)    Admission Cmt: None   Principal Problem: Closed fracture of right distal tibia [S82.301A]                   Active Insurance as of 12/5/2023       Primary Coverage       Payor Plan Insurance Group Employer/Plan Group    AETNA MEDICARE REPLACEMENT AETNA MEDICARE REPLACEMENT [id number]       Payor Plan Address Payor Plan Phone Number Payor Plan Fax Number Effective Dates    [address] [phone]  11/1/2023 - None Entered    [place] [address]         Subscriber Name Subscriber Birth Date Member ID       NOMAN TEIXEIRA 1945 [id number]               Secondary Coverage       Payor Plan Insurance Group Employer/Plan Group    [place]        Payor Plan Address Payor Plan Phone Number Payor Plan Fax Number Effective Dates    [address] [phone]  10/1/2019 - None Entered    [place] [address]         Subscriber Name Subscriber Birth Date Member ID       NOMAN TEIXEIRA 1945 [id number]                     Emergency Contacts  "       (Rel.) Home Phone Work Phone Mobile Phone    Ajit Workman (Spouse) [phone]    Katerine Workman (Daughter) [phone]              [place]: [facility] [id number]  Tax ID [id number]     History & Physical        Mustapha Mahan MD at 12/05/23 1507          History and physical    Primary care physician      Chief complaint  Right tibia-fibula fracture    History of present illness  78-year-old white female who is well-known to our service with history of severe dementia CVA diabetes hypertension hyperlipidemia and coronary artery disease who is a nursing home resident brought to the emergency room and found to have right tibia-fibula fracture.  Patient is demented unable to give detailed history and most of the history obtained from the chart nursing staff old record and I know the patient from previous admission.  Patient denies any fall trauma injury but hurting at the right lower extremity more than usual for last few days.    PAST MEDICAL HISTORY   Insulin-dependent diabetes mellitus      Coronary artery disease      Cerebral vascular accident      Hypertension      Hyperlipidemia      Severe dementia        PAST SURGICAL HISTORY              Procedure Laterality Date    CAROTID ENDARTERECTOMY Left 7/17/2019     Procedure: Left carotid endarterectomy;  Surgeon: Rupert Oliva MD;  Location: [facility] MAIN OR;  Service: Neurosurgery    EMBOLECTOMY Left 7/17/2019     Procedure: CEREBRAL ANGIOGRAM, LEFT INTERNAL CAROTID ARTERY STENT;  Surgeon: Rupert Oliva MD;  Location: [facility] HYBRID OR 18/19;  Service: Neurosurgery    ENDOSCOPY N/A 3/28/2020     Procedure: ESOPHAGOGASTRODUODENOSCOPY AT BEDSIDE WITH EPI INJECTION AND GOLD PROBE CAUTERIZATION;  Surgeon: Malathi Bright MD;  Location: [facility] ENDOSCOPY;  Service: Gastroenterology;  Laterality: N/A;  PRE GI BLEED  POST EROSIVE GASTRITIS, DUODENAL ULCER WITH CLOT         FAMILY  "HISTORY           Problem Relation Age of Onset    Arthritis Mother      Heart disease Father      Breast cancer Neg Hx      Malig Hyperthermia Neg Hx        SOCIAL HISTORY                Socioeconomic History    Marital status:    Tobacco Use    Smoking status: Never    Smokeless tobacco: Never   Vaping Use    Vaping Use: Never used   Substance and Sexual Activity    Alcohol use: No    Drug use: No    Sexual activity: Defer         ALLERGIES  Patient has no known allergies.  Home medications reviewed     REVIEW OF SYSTEMS  All systems reviewed and negative except for those discussed in HPI.      PHYSICAL EXAM  Blood pressure 112/65, pulse 78, temperature 99.5 °F (37.5 °C), temperature source Tympanic, resp. rate 18, height 165.1 cm (65\"), weight 74.4 kg (164 lb), SpO2 93%.    GENERAL: Awake and alert no acute distress  HENT: nares patent, scalp is atraumatic  EYES: no scleral icterus  CV: regular rhythm, normal rate, 2+ edema to the feet bilaterally  RESPIRATORY: normal effort, clear to auscultation bilaterally  ABDOMEN: soft, nontender nondistended bowel sounds positive  MUSCULOSKELETAL: Tenderness to the right tib-fib, no pain to the left leg  NEURO: alert, moves all extremities, follows commands  PSYCH:  calm, cooperative  SKIN: warm, dry     LAB RESULTS  Lab Results (last 24 hours)       Procedure Component Value Units Date/Time    BNP [[id number]] Collected: 12/05/23 1232    Specimen: Blood Updated: 12/05/23 1508    POC Glucose Once [[id number]]  (Normal) Collected: 12/05/23 1416    Specimen: Blood Updated: 12/05/23 1417     Glucose 126 mg/dL     [facility] Draw [[id number]] Collected: 12/05/23 1232    Specimen: Blood Updated: 12/05/23 1345    Narrative:      The following orders were created for panel order [place] Draw.  Procedure                               Abnormality         Status                     ---------                               -----------         ------                     Green Top " (Gel)[[id number]]                                  Final result               Lavender Top[[id number]]                                     Final result               Light Blue Top[[id number]]                                   Final result                 Please view results for these tests on the individual orders.    Green Top (Gel) [[id number]] Collected: 12/05/23 1232    Specimen: Blood Updated: 12/05/23 1345     Extra Tube Hold for add-ons.     Comment: Auto resulted.       Lavender Top [[id number]] Collected: 12/05/23 1232    Specimen: Blood Updated: 12/05/23 1345     Extra Tube hold for add-on     Comment: Auto resulted       Light Blue Top [[id number]] Collected: 12/05/23 1232    Specimen: Blood Updated: 12/05/23 1345     Extra Tube Hold for add-ons.     Comment: Auto resulted       Basic Metabolic Panel [[id number]]  (Abnormal) Collected: 12/05/23 1232    Specimen: Blood Updated: 12/05/23 1342     Glucose 57 mg/dL      BUN 18 mg/dL      Creatinine 0.71 mg/dL      Sodium 135 mmol/L      Potassium 4.0 mmol/L      Comment: Specimen hemolyzed.  Result may be falsely elevated.        Chloride 98 mmol/L      CO2 29.8 mmol/L      Calcium 8.9 mg/dL      BUN/Creatinine Ratio 25.4     Anion Gap 7.2 mmol/L      eGFR 86.6 mL/min/1.73     Narrative:      GFR Normal >60  Chronic Kidney Disease <60  Kidney Failure <15    The GFR formula is only valid for adults with stable renal function between ages 18 and 70.    CBC & Differential [[id number]]  (Abnormal) Collected: 12/05/23 1232    Specimen: Blood Updated: 12/05/23 1258    Narrative:      The following orders were created for panel order CBC & Differential.  Procedure                               Abnormality         Status                     ---------                               -----------         ------                     CBC Auto Differential[[id number]]        Abnormal            Final result                 Please view results for these tests on the individual orders.     CBC Auto Differential [[id number]]  (Abnormal) Collected: 12/05/23 1232    Specimen: Blood Updated: 12/05/23 1258     WBC 8.95 10*3/mm3      RBC 4.07 10*6/mm3      Hemoglobin 11.8 g/dL      Hematocrit 36.3 %      MCV 89.2 fL      MCH 29.0 pg      MCHC 32.5 g/dL      RDW 14.2 %      RDW-SD 46.1 fl      MPV 9.1 fL      Platelets 270 10*3/mm3      Neutrophil % 77.8 %      Lymphocyte % 13.3 %      Monocyte % 6.6 %      Eosinophil % 1.5 %      Basophil % 0.4 %      Immature Grans % 0.4 %      Neutrophils, Absolute 6.96 10*3/mm3      Lymphocytes, Absolute 1.19 10*3/mm3      Monocytes, Absolute 0.59 10*3/mm3      Eosinophils, Absolute 0.13 10*3/mm3      Basophils, Absolute 0.04 10*3/mm3      Immature Grans, Absolute 0.04 10*3/mm3      nRBC 0.0 /100 WBC     POC Glucose Once [[id number]]  (Abnormal) Collected: 12/05/23 1224    Specimen: Blood Updated: 12/05/23 1226     Glucose 56 mg/dL           Imaging Results (Last 24 Hours)       Procedure Component Value Units Date/Time    XR Ankle 3+ View Right [[id number]] Collected: 12/05/23 1508     Updated: 12/05/23 1508    Narrative:      X-RAY RIGHT ANKLE     HISTORY: Ankle pain. Fracture.     3 x-rays of the right ankle are correlated with lower leg x-rays from  earlier today.     FINDINGS: The bones are diffusely demineralized. There are fractures of  the distal tibia and fibula extending obliquely across the distal tibial  diametaphysis with minimal impaction and obliquely across the distal  fibular diametaphysis without displacement or impaction. There is  diffuse soft tissue edema.     The visualized bones in the foot are intact.       Impression:      The bones appear profoundly demineralized and there are  acute appearing distal right tibia and fibula diametaphysis fractures.       XR Tibia Fibula 2 View Right [[id number]] Collected: 12/05/23 1344     Updated: 12/05/23 1351    Narrative:      XR HIP W OR WO PELVIS 2-3 VIEW RIGHT-, XR TIBIA FIBULA 2 VW RIGHT-, XR  FEMUR 2 VW  RIGHT-     Clinical: Evaluate for fracture     FINDINGS: There is an angulated fracture through the distal tibia at the  diaphyseal metaphyseal junction. There is an adjacent angulated fracture  through the distal fibula at the diaphyseal metaphyseal junction. There  is soft tissue swelling of the distal calf and foot. Visualized portions  of the foot demonstrate demineralization. Vascular arterial  calcifications within the soft tissues. The mid and distal femur have a  satisfactory appearance. Greater trochanter superimposes the femoral  neck on the AP projection. Femoral neck fracture not excluded. Computed  tomography of the right hip is recommended as follow-up. The right  hemipelvis is satisfactory in appearance.     CONCLUSION: Angulated fracture involving the distal tibia and fibula at  the diaphyseal metaphyseal junction. Greater trochanter superimposes the  right femoral neck on the AP projection, cannot exclude femoral neck  fracture. CT of the right hip as follow-up.     This report was finalized on 12/5/2023 1:48 PM by Dr. David Ferrara M.D  on Workstation: [id number]       XR Femur 2 View Right [[id number]] Collected: 12/05/23 1344     Updated: 12/05/23 1351    Narrative:      XR HIP W OR WO PELVIS 2-3 VIEW RIGHT-, XR TIBIA FIBULA 2 VW RIGHT-, XR  FEMUR 2 VW RIGHT-     Clinical: Evaluate for fracture     FINDINGS: There is an angulated fracture through the distal tibia at the  diaphyseal metaphyseal junction. There is an adjacent angulated fracture  through the distal fibula at the diaphyseal metaphyseal junction. There  is soft tissue swelling of the distal calf and foot. Visualized portions  of the foot demonstrate demineralization. Vascular arterial  calcifications within the soft tissues. The mid and distal femur have a  satisfactory appearance. Greater trochanter superimposes the femoral  neck on the AP projection. Femoral neck fracture not excluded. Computed  tomography of the right hip is  recommended as follow-up. The right  hemipelvis is satisfactory in appearance.     CONCLUSION: Angulated fracture involving the distal tibia and fibula at  the diaphyseal metaphyseal junction. Greater trochanter superimposes the  right femoral neck on the AP projection, cannot exclude femoral neck  fracture. CT of the right hip as follow-up.     This report was finalized on 12/5/2023 1:48 PM by Dr. David Ferrara M.D  on Workstation: [id number]       XR Hip With or Without Pelvis 2 - 3 View Right [[id number]] Collected: 12/05/23 1344     Updated: 12/05/23 1351    Narrative:      XR HIP W OR WO PELVIS 2-3 VIEW RIGHT-, XR TIBIA FIBULA 2 VW RIGHT-, XR  FEMUR 2 VW RIGHT-     Clinical: Evaluate for fracture     FINDINGS: There is an angulated fracture through the distal tibia at the  diaphyseal metaphyseal junction. There is an adjacent angulated fracture  through the distal fibula at the diaphyseal metaphyseal junction. There  is soft tissue swelling of the distal calf and foot. Visualized portions  of the foot demonstrate demineralization. Vascular arterial  calcifications within the soft tissues. The mid and distal femur have a  satisfactory appearance. Greater trochanter superimposes the femoral  neck on the AP projection. Femoral neck fracture not excluded. Computed  tomography of the right hip is recommended as follow-up. The right  hemipelvis is satisfactory in appearance.     CONCLUSION: Angulated fracture involving the distal tibia and fibula at  the diaphyseal metaphyseal junction. Greater trochanter superimposes the  right femoral neck on the AP projection, cannot exclude femoral neck  fracture. CT of the right hip as follow-up.     This report was finalized on 12/5/2023 1:48 PM by Dr. David Ferrara M.D  on Workstation: [id number]             ASSESSMENT  Right distal tibia/fibula fracture  Probable right femoral neck fracture  Severe dementia  Insulin-dependent diabetes  mellitus  Hypertension  Hyperlipidemia  History of CVA  Gastroesophageal reflux disease    PLAN  Admit  Bedrest  Pain management  Orthopedic surgery consult  Adjust nursing home medications  Stress ulcer DVT prophylaxis   Supportive care  Patient is full code   Discussed with nursing staff  Follow closely further recommendation current hospital course    ISABELL SANDHU MD        Electronically signed by Isabell Sandhu MD at 12/05/23 1516          Emergency Department Notes        Mario Lagos, RN at 12/05/23 1811          Nursing report ED to floor  Darby Workman  78 y.o.  female    HPI :   Chief Complaint   Patient presents with    Leg Injury       Admitting doctor:   Isabell Sandhu MD    Admitting diagnosis:   The primary encounter diagnosis was Closed fracture of distal end of right tibia, unspecified fracture morphology, initial encounter. A diagnosis of Hypoglycemia was also pertinent to this visit.    Code status:   Current Code Status       Date Active Code Status Order ID Comments User Context       12/5/2023 1507 CPR (Attempt to Resuscitate) [id number]  Isabell Sandhu MD ED        Question Answer    Code Status (Patient has no pulse and is not breathing) CPR (Attempt to Resuscitate)    Medical Interventions (Patient has pulse or is breathing) Full Support    Level Of Support Discussed With Patient                    Allergies:   Patient has no known allergies.    Isolation:   No active isolations    Intake and Output  No intake or output data in the 24 hours ending 12/05/23 1811    Weight:       12/05/23  1230   Weight: 74.4 kg (164 lb)       Most recent vitals:   Vitals:    12/05/23 1551 12/05/23 1601 12/05/23 1603 12/05/23 1718   BP: 106/84      Pulse:  71 72 81   Resp:       Temp:       TempSrc:       SpO2:  94% 95% 92%   Weight:       Height:           Active LDAs/IV Access:   Lines, Drains & Airways       Active LDAs       Name Placement date Placement time Site Days    Peripheral IV 12/05/23 6462  Anterior;Distal;Left Wrist 12/05/23  1229  Wrist  less than 1                    Labs (abnormal labs have a star):   Labs Reviewed   BASIC METABOLIC PANEL - Abnormal; Notable for the following components:       Result Value    Glucose 57 (*)     Sodium 135 (*)     CO2 29.8 (*)     BUN/Creatinine Ratio 25.4 (*)     All other components within normal limits    Narrative:     GFR Normal >60  Chronic Kidney Disease <60  Kidney Failure <15    The GFR formula is only valid for adults with stable renal function between ages 18 and 70.   CBC WITH AUTO DIFFERENTIAL - Abnormal; Notable for the following components:    Hemoglobin 11.8 (*)     Neutrophil % 77.8 (*)     Lymphocyte % 13.3 (*)     All other components within normal limits   POCT GLUCOSE FINGERSTICK - Abnormal; Notable for the following components:    Glucose 56 (*)     All other components within normal limits   BNP (IN-HOUSE) - Normal    Narrative:     This assay is used as an aid in the diagnosis of individuals suspected of having heart failure. It can be used as an aid in the diagnosis of acute decompensated heart failure (ADHF) in patients presenting with signs and symptoms of ADHF to the emergency department (ED). In addition, NT-proBNP of <300 pg/mL indicates ADHF is not likely.    Age Range Result Interpretation  NT-proBNP Concentration (pg/mL:      <50             Positive            >450                   Gray                 300-450                    Negative             <300    50-75           Positive            >900                  Gray                300-900                  Negative            <300      >75             Positive            >1800                  Gray                300-1800                  Negative            <300   POCT GLUCOSE FINGERSTICK - Normal   RAINBOW DRAW    Narrative:     The following orders were created for panel order [place] Draw.  Procedure                               Abnormality         Status                      ---------                               -----------         ------                     Green Top (Gel)[[id number]]                                  Final result               Lavender Top[[id number]]                                     Final result               Light Blue Top[[id number]]                                   Final result                 Please view results for these tests on the individual orders.   POCT GLUCOSE FINGERSTICK   POCT GLUCOSE FINGERSTICK   POCT GLUCOSE FINGERSTICK   POCT GLUCOSE FINGERSTICK   POCT GLUCOSE FINGERSTICK   POCT GLUCOSE FINGERSTICK   GREEN TOP   LAVENDER TOP   LIGHT BLUE TOP   CBC AND DIFFERENTIAL    Narrative:     The following orders were created for panel order CBC & Differential.  Procedure                               Abnormality         Status                     ---------                               -----------         ------                     CBC Auto Differential[[id number]]        Abnormal            Final result                 Please view results for these tests on the individual orders.       EKG:   No orders to display       Meds given in ED:   Medications   sodium chloride 0.9 % flush 10 mL (has no administration in time range)   sodium chloride 0.9 % flush 10 mL (has no administration in time range)   ondansetron (ZOFRAN) injection 4 mg (4 mg Intravenous Given 12/5/23 1736)   HYDROmorphone (DILAUDID) injection 0.25 mg (0.25 mg Intravenous Given 12/5/23 1737)   brimonidine (ALPHAGAN) 0.2 % ophthalmic solution 1 drop (has no administration in time range)   clonazePAM (KlonoPIN) tablet 0.5 mg (has no administration in time range)   divalproex (DEPAKOTE) DR tablet 125 mg (0 mg Oral Hold 12/5/23 1628)   dorzolamide (TRUSOPT) 2 % ophthalmic solution 1 drop (has no administration in time range)   nebivolol (BYSTOLIC) tablet 2.5 mg (0 mg Oral Hold 12/5/23 1628)   OLANZapine (zyPREXA) tablet 7.5 mg (has no administration in time range)   pantoprazole (PROTONIX) EC  tablet 40 mg (has no administration in time range)   PARoxetine (PAXIL) tablet 10 mg (0 mg Oral Hold 12/5/23 1629)   losartan (COZAAR) tablet 25 mg (0 mg Oral Hold 12/5/23 1629)   acetaminophen (TYLENOL) tablet 650 mg (has no administration in time range)   dextrose (GLUTOSE) oral gel 15 g (has no administration in time range)   dextrose (D50W) (25 g/50 mL) IV injection 25 g (has no administration in time range)   glucagon (GLUCAGEN) injection 1 mg (has no administration in time range)   insulin lispro (HUMALOG/ADMELOG) injection 2-7 Units (has no administration in time range)   HYDROcodone-acetaminophen (NORCO) 5-325 MG per tablet 1 tablet (has no administration in time range)   aspirin chewable tablet 81 mg (0 mg Oral Hold 12/5/23 1629)   dextrose (D50W) (25 g/50 mL) IV injection 25 g (25 g Intravenous Given 12/5/23 1229)   HYDROmorphone (DILAUDID) injection 0.25 mg (0.25 mg Intravenous Given 12/5/23 1329)   ondansetron (ZOFRAN) injection 4 mg (4 mg Intravenous Given 12/5/23 1329)       Imaging results:  CT Pelvis Without Contrast    Result Date: 12/5/2023  No acute fracture identified around the pelvis or the proximal femurs. There is asymmetric soft tissue edema in the right thigh.      XR Ankle 3+ View Right    Result Date: 12/5/2023  The bones appear profoundly demineralized and there are acute appearing distal right tibia and fibula diametaphysis fractures.  This report was finalized on 12/5/2023 4:41 PM by Dr. Ajit Day M.D on Workstation: [id number]       Ambulatory status:   - bedrest     Social issues:   Social History     Socioeconomic History    Marital status:    Tobacco Use    Smoking status: Never    Smokeless tobacco: Never   Vaping Use    Vaping Use: Never used   Substance and Sexual Activity    Alcohol use: No    Drug use: No    Sexual activity: Defer       NIH Stroke Scale:       Mario Saunders RN  12/05/23 18:11 EST          Electronically signed by Mario Lagos RN at  12/05/23 1811       Pacheco Jamil, RN at 12/05/23 1807          Nursing report ED to floor  Darby Workman  78 y.o.  female    HPI :   Chief Complaint   Patient presents with    Leg Injury       Admitting doctor:   Mustapha Mahan MD    Admitting diagnosis:   The primary encounter diagnosis was Closed fracture of distal end of right tibia, unspecified fracture morphology, initial encounter. A diagnosis of Hypoglycemia was also pertinent to this visit.    Code status:   Current Code Status       Date Active Code Status Order ID Comments User Context       12/5/2023 1507 CPR (Attempt to Resuscitate) [id number]  Mustapha Mahan MD ED        Question Answer    Code Status (Patient has no pulse and is not breathing) CPR (Attempt to Resuscitate)    Medical Interventions (Patient has pulse or is breathing) Full Support    Level Of Support Discussed With Patient                    Allergies:   Patient has no known allergies.    Isolation:   No active isolations    Intake and Output  No intake or output data in the 24 hours ending 12/05/23 1807    Weight:       12/05/23  1230   Weight: 74.4 kg (164 lb)       Most recent vitals:   Vitals:    12/05/23 1551 12/05/23 1601 12/05/23 1603 12/05/23 1718   BP: 106/84      Pulse:  71 72 81   Resp:       Temp:       TempSrc:       SpO2:  94% 95% 92%   Weight:       Height:           Active LDAs/IV Access:   Lines, Drains & Airways       Active LDAs       Name Placement date Placement time Site Days    Peripheral IV 12/05/23 1229 Anterior;Distal;Left Wrist 12/05/23  1229  Wrist  less than 1                    Labs (abnormal labs have a star):   Labs Reviewed   BASIC METABOLIC PANEL - Abnormal; Notable for the following components:       Result Value    Glucose 57 (*)     Sodium 135 (*)     CO2 29.8 (*)     BUN/Creatinine Ratio 25.4 (*)     All other components within normal limits    Narrative:     GFR Normal >60  Chronic Kidney Disease <60  Kidney Failure <15    The GFR formula is  only valid for adults with stable renal function between ages 18 and 70.   CBC WITH AUTO DIFFERENTIAL - Abnormal; Notable for the following components:    Hemoglobin 11.8 (*)     Neutrophil % 77.8 (*)     Lymphocyte % 13.3 (*)     All other components within normal limits   POCT GLUCOSE FINGERSTICK - Abnormal; Notable for the following components:    Glucose 56 (*)     All other components within normal limits   BNP (IN-HOUSE) - Normal    Narrative:     This assay is used as an aid in the diagnosis of individuals suspected of having heart failure. It can be used as an aid in the diagnosis of acute decompensated heart failure (ADHF) in patients presenting with signs and symptoms of ADHF to the emergency department (ED). In addition, NT-proBNP of <300 pg/mL indicates ADHF is not likely.    Age Range Result Interpretation  NT-proBNP Concentration (pg/mL:      <50             Positive            >450                   Gray                 300-450                    Negative             <300    50-75           Positive            >900                  Gray                300-900                  Negative            <300      >75             Positive            >1800                  Gray                300-1800                  Negative            <300   POCT GLUCOSE FINGERSTICK - Normal   RAINBOW DRAW    Narrative:     The following orders were created for panel order [place] Draw.  Procedure                               Abnormality         Status                     ---------                               -----------         ------                     Green Top (Gel)[[id number]]                                  Final result               Lavender Top[[id number]]                                     Final result               Light Blue Top[[id number]]                                   Final result                 Please view results for these tests on the individual orders.   POCT GLUCOSE FINGERSTICK   POCT GLUCOSE  FINGERSTICK   POCT GLUCOSE FINGERSTICK   POCT GLUCOSE FINGERSTICK   POCT GLUCOSE FINGERSTICK   POCT GLUCOSE FINGERSTICK   GREEN TOP   LAVENDER TOP   LIGHT BLUE TOP   CBC AND DIFFERENTIAL    Narrative:     The following orders were created for panel order CBC & Differential.  Procedure                               Abnormality         Status                     ---------                               -----------         ------                     CBC Auto Differential[[id number]]        Abnormal            Final result                 Please view results for these tests on the individual orders.       EKG:   No orders to display       Meds given in ED:   Medications   sodium chloride 0.9 % flush 10 mL (has no administration in time range)   sodium chloride 0.9 % flush 10 mL (has no administration in time range)   ondansetron (ZOFRAN) injection 4 mg (4 mg Intravenous Given 12/5/23 1736)   HYDROmorphone (DILAUDID) injection 0.25 mg (0.25 mg Intravenous Given 12/5/23 1737)   brimonidine (ALPHAGAN) 0.2 % ophthalmic solution 1 drop (has no administration in time range)   clonazePAM (KlonoPIN) tablet 0.5 mg (has no administration in time range)   divalproex (DEPAKOTE) DR tablet 125 mg (0 mg Oral Hold 12/5/23 1628)   dorzolamide (TRUSOPT) 2 % ophthalmic solution 1 drop (has no administration in time range)   nebivolol (BYSTOLIC) tablet 2.5 mg (0 mg Oral Hold 12/5/23 1628)   OLANZapine (zyPREXA) tablet 7.5 mg (has no administration in time range)   pantoprazole (PROTONIX) EC tablet 40 mg (has no administration in time range)   PARoxetine (PAXIL) tablet 10 mg (0 mg Oral Hold 12/5/23 1629)   losartan (COZAAR) tablet 25 mg (0 mg Oral Hold 12/5/23 1629)   acetaminophen (TYLENOL) tablet 650 mg (has no administration in time range)   dextrose (GLUTOSE) oral gel 15 g (has no administration in time range)   dextrose (D50W) (25 g/50 mL) IV injection 25 g (has no administration in time range)   glucagon (GLUCAGEN) injection 1 mg (has  no administration in time range)   insulin lispro (HUMALOG/ADMELOG) injection 2-7 Units (has no administration in time range)   HYDROcodone-acetaminophen (NORCO) 5-325 MG per tablet 1 tablet (has no administration in time range)   aspirin chewable tablet 81 mg (0 mg Oral Hold 12/5/23 1629)   dextrose (D50W) (25 g/50 mL) IV injection 25 g (25 g Intravenous Given 12/5/23 1229)   HYDROmorphone (DILAUDID) injection 0.25 mg (0.25 mg Intravenous Given 12/5/23 1329)   ondansetron (ZOFRAN) injection 4 mg (4 mg Intravenous Given 12/5/23 1329)       Imaging results:  CT Pelvis Without Contrast    Result Date: 12/5/2023  No acute fracture identified around the pelvis or the proximal femurs. There is asymmetric soft tissue edema in the right thigh.      XR Ankle 3+ View Right    Result Date: 12/5/2023  The bones appear profoundly demineralized and there are acute appearing distal right tibia and fibula diametaphysis fractures.  This report was finalized on 12/5/2023 4:41 PM by Dr. Ajit Day M.D on Workstation: [id number]       Ambulatory status:   - assist    Social issues:   Social History     Socioeconomic History    Marital status:    Tobacco Use    Smoking status: Never    Smokeless tobacco: Never   Vaping Use    Vaping Use: Never used   Substance and Sexual Activity    Alcohol use: No    Drug use: No    Sexual activity: Defer       NIH Stroke Scale:       Pacheco Jamil RN  12/05/23 18:07 EST          Electronically signed by Pacheco Jamil RN at 12/05/23 6404       Alexandre Rosa II, MD at 12/05/23 1237        Procedure Orders    1. Splint - Cast - Strapping [[id number]] ordered by Alexandre Rosa II, MD                  EMERGENCY DEPARTMENT ENCOUNTER    Room Number:  12/12  PCP: Eric Matta MD      HPI:  Chief Complaint: Abnormal imaging  A complete HPI/ROS/PMH/PSH/SH/FH are unobtainable due to: Nonverbal  Context: Darby Workman is a 78 y.o. female who presents to the ED c/o abnormal  imaging.  Patient comes from facility.  They obtained an x-ray of her right leg that showed tib-fib fracture.  No known falls.          PAST MEDICAL HISTORY  Active Ambulatory Problems     Diagnosis Date Noted    Hypertensive crisis 05/14/2019    Diabetes mellitus 05/14/2019    Hyperlipidemia 05/14/2019    Hypertension 05/14/2019    Elevated troponin 05/14/2019    Acute cerebrovascular accident (CVA) of cerebellum 05/15/2019    Right-sided headache 05/21/2019    Acute ischemic stroke 05/21/2019    Embolic stroke 05/22/2019    Anticoagulated by anticoagulation treatment 06/28/2019    Stroke (cerebrum) 08/01/2019    Dysthymic disorder 08/29/2019    Hypotension 09/04/2019    Upper GI bleed 03/27/2020    Uremic encephalopathy 04/01/2020    Acute kidney injury 04/01/2020    Acute pancreatitis 04/01/2020    Hemorrhagic shock 04/01/2020    Thrombocytopenia 04/01/2020    Type 2 diabetes mellitus with hyperglycemia 04/01/2020    Dementia without behavioral disturbance 04/01/2020    Acute posthemorrhagic anemia 04/01/2020    Pemphigus vulgaris 10/18/2022     Resolved Ambulatory Problems     Diagnosis Date Noted    Cerebral infarction due to stenosis of left carotid artery 06/28/2019    KIERAN (acute kidney injury) 09/04/2019     Past Medical History:   Diagnosis Date    Arthritis     Coronary artery disease     CVA (cerebral vascular accident)     Heart attack     History of blood clots     Vision loss          PAST SURGICAL HISTORY  Past Surgical History:   Procedure Laterality Date    CAROTID ENDARTERECTOMY Left 7/17/2019    Procedure: Left carotid endarterectomy;  Surgeon: Rupert Oliva MD;  Location: [facility];  Service: Neurosurgery    EMBOLECTOMY Left 7/17/2019    Procedure: CEREBRAL ANGIOGRAM, LEFT INTERNAL CAROTID ARTERY STENT;  Surgeon: Rupert Oliva MD;  Location: [facility] 18/19;  Service: Neurosurgery    ENDOSCOPY N/A 3/28/2020    Procedure: ESOPHAGOGASTRODUODENOSCOPY AT BEDSIDE WITH EPI  INJECTION AND GOLD PROBE CAUTERIZATION;  Surgeon: Malathi Bright MD;  Location: [facility] ENDOSCOPY;  Service: Gastroenterology;  Laterality: N/A;  PRE GI BLEED  POST EROSIVE GASTRITIS, DUODENAL ULCER WITH CLOT         FAMILY HISTORY  Family History   Problem Relation Age of Onset    Arthritis Mother     Heart disease Father     Breast cancer Neg Hx     Malig Hyperthermia Neg Hx          SOCIAL HISTORY  Social History     Socioeconomic History    Marital status:    Tobacco Use    Smoking status: Never    Smokeless tobacco: Never   Vaping Use    Vaping Use: Never used   Substance and Sexual Activity    Alcohol use: No    Drug use: No    Sexual activity: Defer         ALLERGIES  Patient has no known allergies.        REVIEW OF SYSTEMS  Review of Systems     All systems reviewed and negative except for those discussed in HPI.       PHYSICAL EXAM  ED Triage Vitals [12/05/23 1207]   Temp Heart Rate Resp BP SpO2   99.5 °F (37.5 °C) 90 18 167/96 96 %      Temp src Heart Rate Source Patient Position BP Location FiO2 (%)   Tympanic Monitor -- -- --       Physical Exam      GENERAL: no acute distress  HENT: nares patent, scalp is atraumatic  EYES: no scleral icterus  CV: regular rhythm, normal rate, good cap refill to the feet bilaterally, 2+ edema to the feet bilaterally  RESPIRATORY: normal effort, clear to auscultation bilaterally  ABDOMEN: soft, nontender  MUSCULOSKELETAL: Tenderness to the right tib-fib, no pain to the left leg  NEURO: alert, moves all extremities, follows commands  PSYCH:  calm, cooperative  SKIN: warm, dry    Vital signs and nursing notes reviewed.          LAB RESULTS  Recent Results (from the past 24 hour(s))   POC Glucose Once    Collection Time: 12/05/23 12:24 PM    Specimen: Blood   Result Value Ref Range    Glucose 56 (L) 70 - 130 mg/dL   Green Top (Gel)    Collection Time: 12/05/23 12:32 PM   Result Value Ref Range    Extra Tube Hold for add-ons.    Lavender Top    Collection Time:  12/05/23 12:32 PM   Result Value Ref Range    Extra Tube hold for add-on    Light Blue Top    Collection Time: 12/05/23 12:32 PM   Result Value Ref Range    Extra Tube Hold for add-ons.    Basic Metabolic Panel    Collection Time: 12/05/23 12:32 PM    Specimen: Blood   Result Value Ref Range    Glucose 57 (L) 65 - 99 mg/dL    BUN 18 8 - 23 mg/dL    Creatinine 0.71 0.57 - 1.00 mg/dL    Sodium 135 (L) 136 - 145 mmol/L    Potassium 4.0 3.5 - 5.2 mmol/L    Chloride 98 98 - 107 mmol/L    CO2 29.8 (H) 22.0 - 29.0 mmol/L    Calcium 8.9 8.6 - 10.5 mg/dL    BUN/Creatinine Ratio 25.4 (H) 7.0 - 25.0    Anion Gap 7.2 5.0 - 15.0 mmol/L    eGFR 86.6 >60.0 mL/min/1.73   CBC Auto Differential    Collection Time: 12/05/23 12:32 PM    Specimen: Blood   Result Value Ref Range    WBC 8.95 3.40 - 10.80 10*3/mm3    RBC 4.07 3.77 - 5.28 10*6/mm3    Hemoglobin 11.8 (L) 12.0 - 15.9 g/dL    Hematocrit 36.3 34.0 - 46.6 %    MCV 89.2 79.0 - 97.0 fL    MCH 29.0 26.6 - 33.0 pg    MCHC 32.5 31.5 - 35.7 g/dL    RDW 14.2 12.3 - 15.4 %    RDW-SD 46.1 37.0 - 54.0 fl    MPV 9.1 6.0 - 12.0 fL    Platelets 270 140 - 450 10*3/mm3    Neutrophil % 77.8 (H) 42.7 - 76.0 %    Lymphocyte % 13.3 (L) 19.6 - 45.3 %    Monocyte % 6.6 5.0 - 12.0 %    Eosinophil % 1.5 0.3 - 6.2 %    Basophil % 0.4 0.0 - 1.5 %    Immature Grans % 0.4 0.0 - 0.5 %    Neutrophils, Absolute 6.96 1.70 - 7.00 10*3/mm3    Lymphocytes, Absolute 1.19 0.70 - 3.10 10*3/mm3    Monocytes, Absolute 0.59 0.10 - 0.90 10*3/mm3    Eosinophils, Absolute 0.13 0.00 - 0.40 10*3/mm3    Basophils, Absolute 0.04 0.00 - 0.20 10*3/mm3    Immature Grans, Absolute 0.04 0.00 - 0.05 10*3/mm3    nRBC 0.0 0.0 - 0.2 /100 WBC   BNP    Collection Time: 12/05/23 12:32 PM    Specimen: Blood   Result Value Ref Range    proBNP 517.0 0.0 - 1,800.0 pg/mL   POC Glucose Once    Collection Time: 12/05/23  2:16 PM    Specimen: Blood   Result Value Ref Range    Glucose 126 70 - 130 mg/dL       Ordered the above labs and reviewed  the results.        RADIOLOGY  XR Ankle 3+ View Right    Result Date: 12/5/2023  X-RAY RIGHT ANKLE  HISTORY: Ankle pain. Fracture.  3 x-rays of the right ankle are correlated with lower leg x-rays from earlier today.  FINDINGS: The bones are diffusely demineralized. There are fractures of the distal tibia and fibula extending obliquely across the distal tibial diametaphysis with minimal impaction and obliquely across the distal fibular diametaphysis without displacement or impaction. There is diffuse soft tissue edema.  The visualized bones in the foot are intact.      The bones appear profoundly demineralized and there are acute appearing distal right tibia and fibula diametaphysis fractures.      XR Tibia Fibula 2 View Right, XR Femur 2 View Right, XR Hip With or Without Pelvis 2 - 3 View Right    Result Date: 12/5/2023  XR HIP W OR WO PELVIS 2-3 VIEW RIGHT-, XR TIBIA FIBULA 2 VW RIGHT-, XR FEMUR 2 VW RIGHT-  Clinical: Evaluate for fracture  FINDINGS: There is an angulated fracture through the distal tibia at the diaphyseal metaphyseal junction. There is an adjacent angulated fracture through the distal fibula at the diaphyseal metaphyseal junction. There is soft tissue swelling of the distal calf and foot. Visualized portions of the foot demonstrate demineralization. Vascular arterial calcifications within the soft tissues. The mid and distal femur have a satisfactory appearance. Greater trochanter superimposes the femoral neck on the AP projection. Femoral neck fracture not excluded. Computed tomography of the right hip is recommended as follow-up. The right hemipelvis is satisfactory in appearance.  CONCLUSION: Angulated fracture involving the distal tibia and fibula at the diaphyseal metaphyseal junction. Greater trochanter superimposes the right femoral neck on the AP projection, cannot exclude femoral neck fracture. CT of the right hip as follow-up.  This report was finalized on 12/5/2023 1:48 PM by   David Ferrara M.D on Workstation: [id number]       Ordered the above noted radiological studies. Reviewed by me in PACS.          PROCEDURES  Splint - Cast - Strapping    Date/Time: 12/5/2023 3:32 PM    Performed by: Alexandre Rosa II, MD  Authorized by: Alexandre Rosa II, MD    Consent:     Consent obtained:  Verbal    Consent given by:  Patient  Pre-procedure details:     Distal neurologic exam:  Normal    Distal perfusion: brisk capillary refill    Procedure details:     Location:  Leg    Leg location:  R lower leg    Splint type:  Ankle stirrup and short leg    Supplies:  Fiberglass and cotton padding    Attestation: Splint applied and adjusted personally by me    Post-procedure details:     Distal neurologic exam:  Normal    Distal perfusion: brisk capillary refill      Procedure completion:  Tolerated well, no immediate complications    Post-procedure imaging: not applicable            MEDICATIONS GIVEN IN ER  Medications   sodium chloride 0.9 % flush 10 mL (has no administration in time range)   sodium chloride 0.9 % flush 10 mL (has no administration in time range)   ondansetron (ZOFRAN) injection 4 mg (has no administration in time range)   HYDROmorphone (DILAUDID) injection 0.25 mg (has no administration in time range)   brimonidine (ALPHAGAN) 0.2 % ophthalmic solution 1 drop (has no administration in time range)   clonazePAM (KlonoPIN) tablet 0.5 mg (has no administration in time range)   divalproex (DEPAKOTE) DR tablet 125 mg (has no administration in time range)   dorzolamide (TRUSOPT) 2 % ophthalmic solution 1 drop (has no administration in time range)   nebivolol (BYSTOLIC) tablet 2.5 mg (has no administration in time range)   OLANZapine (zyPREXA) tablet 7.5 mg (has no administration in time range)   pantoprazole (PROTONIX) EC tablet 40 mg (has no administration in time range)   PARoxetine (PAXIL) tablet 10 mg (has no administration in time range)   losartan (COZAAR) tablet 25 mg (has no  administration in time range)   acetaminophen (TYLENOL) tablet 650 mg (has no administration in time range)   dextrose (GLUTOSE) oral gel 15 g (has no administration in time range)   dextrose (D50W) (25 g/50 mL) IV injection 25 g (has no administration in time range)   glucagon (GLUCAGEN) injection 1 mg (has no administration in time range)   insulin lispro (HUMALOG/ADMELOG) injection 2-7 Units (has no administration in time range)   HYDROcodone-acetaminophen (NORCO) 5-325 MG per tablet 1 tablet (has no administration in time range)   aspirin chewable tablet 81 mg (has no administration in time range)   dextrose (D50W) (25 g/50 mL) IV injection 25 g (25 g Intravenous Given 12/5/23 1229)   HYDROmorphone (DILAUDID) injection 0.25 mg (0.25 mg Intravenous Given 12/5/23 1329)   ondansetron (ZOFRAN) injection 4 mg (4 mg Intravenous Given 12/5/23 1329)         MEDICAL DECISION MAKING, PROGRESS, and CONSULTS    Discussion below represents my analysis of pertinent findings related to patient's condition, differential diagnosis, treatment plan and final disposition.      Orders placed during this visit:  Orders Placed This Encounter   Procedures    XR Tibia Fibula 2 View Right    XR Femur 2 View Right    XR Hip With or Without Pelvis 2 - 3 View Right    XR Ankle 3+ View Right    CT Pelvis Without Contrast    CT Lower Extremity Right Without Contrast    [place] Draw    Basic Metabolic Panel    CBC Auto Differential    Comprehensive Metabolic Panel    BNP    TSH    Lipid Panel    Hemoglobin A1c    Monitor Blood Pressure    Pulse Oximetry, Continuous    Place Sequential Compression Device    Maintain Sequential Compression Device    Bed Rest    Code Status and Medical Interventions:    Ortho (on-call MD unless specified)    POC Glucose Once    POC Glucose Once    POC Glucose Once    POC Glucose 4x Daily Before Meals & at Bedtime    Insert peripheral IV    Insert Peripheral IV    Initiate Observation Status    Green Top (Gel)     Lavender Top    Light Blue Top    CBC & Differential    CBC & Differential                 Differential diagnosis:    Fracture, dislocation            Independent interpretation of labs, radiology studies, and discussions with consultants:  ED Course as of 23 1531   Tue Dec 05, 2023   1233 Glucose(!): 56 [TD]   1407 Sodium(!): 135 [TD]   1445 Glucose: 126 [TD]      ED Course User Index  [TD] Alexandre Rosa II, MD         Patient's head is atraumatic.  I do not think she needs a CT scan of her head.    There is some question about possible hip fracture for which I have ordered a CT of the pelvis.    I discussed the case Dr. Chavez, repeat  has requested add on CT of the lower extremity to better visualize the fracture near the distal tib-fib.    I discussed the case with Dr. Mahan, hospitalist.  He will admit the patient primarily.      DIAGNOSIS  Final diagnoses:   Closed fracture of distal end of right tibia, unspecified fracture morphology, initial encounter   Hypoglycemia         DISPOSITION  Admit      Latest Documented Vital Signs:  As of 15:31 EST  BP- 112/65 HR- 78 Temp- 99.5 °F (37.5 °C) (Tympanic) O2 sat- 93%      --    Please note that portions of this were completed with a voice recognition program.       Note Disclaimer: At [facility], we believe that sharing information builds trust and better relationships. You are receiving this note because you are receiving care at [facility] or recently visited. It is possible you will see health information before a provider has talked with you about it. This kind of information can be easy to misunderstand. To help you fully understand what it means for your health, we urge you to discuss this note with your provider.         Alexandre Rosa II, MD  23 1533      Electronically signed by Alexandre Rosa II, MD at 23 1533       Jacki Reynolds RN at 23 1205          Pt to [facility] via EMS      Pt  c/o injured R leg. Pt is bed confined, EMS doesn't know when pt fell or how. Facility did Xrays and pt had R tib/fib fracture. Pt hx dementia. Pt at baseline line mental status per EMS. Pt BG is 58    Electronically signed by Jacki Reynolds RN at 12/05/23 1212       Oxygen Therapy (since admission)       Date/Time SpO2 Device (Oxygen Therapy) Flow (L/min) Oxygen Concentration (%) ETCO2 (mmHg)    12/07/23 0959 96 room air -- -- --    12/07/23 0532 92 room air -- -- --    12/06/23 2035 95 room air -- -- --    12/06/23 1700 96 -- -- -- --    12/06/23 1030 -- room air -- -- --    12/06/23 0900 96 room air -- -- --    12/06/23 0501 94 room air -- -- --    12/06/23 0412 -- room air -- -- --    12/06/23 0200 -- room air -- -- --    12/06/23 0000 -- room air -- -- --    12/05/23 2200 -- room air -- -- --    12/05/23 2051 94 room air -- -- --    12/05/23 2000 -- room air -- -- --    12/05/23 1718 92 -- -- -- --    12/05/23 1603 95 -- -- -- --    12/05/23 1601 94 -- -- -- --    12/05/23 1431 93 -- -- -- --    12/05/23 1335 93 -- -- -- --    12/05/23 1207 96 room air -- -- --          Intake & Output (last 3 days)         12/04 0701 12/05 0700 12/05 0701 12/06 0700 12/06 0701 12/07 0700 12/07 0701 12/08 0700    P.O.  60 420 120    Total Intake(mL/kg)  60 (0.8) 420 (5.6) 120 (1.6)    Urine (mL/kg/hr)  250 1200 (0.7)     Total Output  250 1200     Net  -190 -780 +120                   Lines, Drains & Airways       Active LDAs       Name Placement date Placement time Site Days    Peripheral IV 12/06/23 1630 Anterior;Right Forearm 12/06/23  1630  Forearm  less than 1    External Urinary Catheter 12/05/23  2100  --  1              Inactive LDAs       Name Placement date Placement time Removal date Removal time Site Days    [REMOVED] Peripheral IV 12/05/23 1229 Anterior;Distal;Left Wrist 12/05/23  1229  12/06/23  1600  Wrist  1                  Medication Administration Report for Darby Workman as of 12/07/23 1022      Legend:    Given Hold Not Given Due Canceled Entry Other Actions    Time Time (Time) Time Time-Action         Discontinued     Completed     Future     MAR Hold     Linked             Medications 12/05/23 12/06/23 12/07/23      acetaminophen (TYLENOL) tablet 650 mg  Dose: 650 mg  Freq: Every 4 Hours PRN Route: PO  PRN Reason: Mild Pain  Start: 12/05/23 1506   Admin Instructions:   If given for fever, use fever parameter: fever greater than 100.4 °F  Based on patient request - if ordered for moderate or severe pain, provider allows for administration of a medication prescribed for a lower pain scale.    Do not exceed 4 grams of acetaminophen in a 24 hr period. Max dose of 2gm for AST/ALT greater than 120 units/L.    If given for pain, use the following pain scale:   Mild Pain = Pain Score of 1-3, CPOT 1-2  Moderate Pain = Pain Score of 4-6, CPOT 3-4  Severe Pain = Pain Score of 7-10, CPOT 5-8          aspirin chewable tablet 81 mg  Dose: 81 mg  Freq: Daily Route: PO  Start: 12/05/23 1533   Admin Instructions:   Herbal/drug interaction: Avoid use with ginkgo biloba.  Do not exceed 4 grams of aspirin in a 24 hr period.    If given for pain, use the following pain scale:   Mild Pain = Pain Score of 1-3, CPOT 1-2  Moderate Pain = Pain Score of 4-6, CPOT 3-4  Severe Pain = Pain Score of 7-10, CPOT 5-8    1629-Hold          (1011)-Not Given          0800-Given             brimonidine (ALPHAGAN) 0.2 % ophthalmic solution 1 drop  Dose: 1 drop  Freq: 2 Times Daily Route: Both Eyes  Indications of Use: OPEN-ANGLE GLAUCOMA  Start: 12/05/23 2100    2154-Given          (1012)-Not Given     2056-Given         0800-Given     2100            castor oil-balsam peru (VENELEX) ointment 1 application   Dose: 1 application   Freq: Every 12 Hours Scheduled Route: TOP  Start: 12/06/23 1115   Admin Instructions:   Apply to left heel, keep heel off-loaded with heel boot at all times. .     (1215)-Not Given [C]     2356-Given          0807-Given     2100            clonazePAM (KlonoPIN) tablet 0.5 mg  Dose: 0.5 mg  Freq: 3 Times Daily PRN Route: PO  PRN Reason: Anxiety  Start: 12/05/23 1505   End: 12/12/23 1504   Admin Instructions:   Caution: Look alike/sound alike drug alert. Please read the label.  Group 2 ([place]) Hazardous Drug - Reproductive Risk Only - See Handling Guide    2239-Given               dextrose (D50W) (25 g/50 mL) IV injection 25 g  Dose: 25 g  Freq: Every 15 Minutes PRN Route: IV  PRN Reason: Low Blood Sugar  PRN Comment: Blood Sugar Less Than 70  Start: 12/05/23 1507   Admin Instructions:   Blood sugar less than 70; patient has IV access - Unresponsive, NPO or Unable To Safely Swallow    2239-Given               dextrose (GLUTOSE) oral gel 15 g  Dose: 15 g  Freq: Every 15 Minutes PRN Route: PO  PRN Reason: Low Blood Sugar  PRN Comment: Blood sugar less than 70  Start: 12/05/23 1507   Admin Instructions:   BS<70, Patient Alert, Is not NPO, Can safely swallow.          divalproex (DEPAKOTE) DR tablet 125 mg  Dose: 125 mg  Freq: Daily Route: PO  Start: 12/05/23 1521   Admin Instructions:   Do not crush or chew the capsules or tablets. The drug may not work as designed if the capsule or tablet is crushed or chewed. Swallow whole.  Group 2 ([place]) Hazardous Drug - Reproductive Risk Only - See Handling Guide    1628-Hold          (1012)-Not Given          0800-Given             dorzolamide (TRUSOPT) 2 % ophthalmic solution 1 drop  Dose: 1 drop  Freq: 2 Times Daily Route: Both Eyes  Indications of Use: OPEN-ANGLE GLAUCOMA  Start: 12/05/23 2100 2154-Given          (1012)-Not Given     2056-Given         0800-Given     2100            glucagon (GLUCAGEN) injection 1 mg  Dose: 1 mg  Freq: Every 15 Minutes PRN Route: IM  PRN Reason: Low Blood Sugar  PRN Comment: Blood Glucose Less Than 70  Start: 12/05/23 1507   Admin Instructions:   Blood Glucose Less Than 70 - Patient Without IV Access - Unresponsive, NPO or Unable To Safely  Swallow  Reconstitute powder for injection by adding 1 mL of -supplied sterile diluent or sterile water for injection to a vial containing 1 mg of the drug, to provide solutions containing 1 mg/mL. Shake vial gently to dissolve.          HYDROcodone-acetaminophen (NORCO) 5-325 MG per tablet 1 tablet  Dose: 1 tablet  Freq: Every 6 Hours PRN Route: PO  PRN Reason: Moderate Pain  Start: 12/05/23 1517   End: 12/12/23 1516   Admin Instructions:   Based on patient request - if ordered for moderate or severe pain, provider allows for administration of a medication prescribed for a lower pain scale.  [GWEN]    Do not exceed 4 grams of acetaminophen in a 24 hr period. Max dose of 2gm for AST/ALT greater than 120 units/L        If given for pain, use the following pain scale:   Mild Pain = Pain Score of 1-3, CPOT 1-2  Moderate Pain = Pain Score of 4-6, CPOT 3-4  Severe Pain = Pain Score of 7-10, CPOT 5-8    2249-Given               hydrocortisone-bacitracin-zinc oxide-nystatin (MAGIC BARRIER) ointment 1 application   Dose: 1 application   Freq: 3 Times Daily Route: TOP  Start: 12/06/23 1115   Admin Instructions:   Apply to perineal/ buttocks skin. DO NOT USE UNDER OPTIFOAM . Alternate with zinc barrier cream   For topical use only     (1215)-Not Given [C]     1817-Given     2102-Given        0801-Given     1600     2100           HYDROmorphone (DILAUDID) injection 0.25 mg  Dose: 0.25 mg  Freq: Every 4 Hours PRN Route: IV  PRN Reason: Severe Pain  Start: 12/05/23 1504   Admin Instructions:   Based on patient request - if ordered for moderate or severe pain, provider allows for administration of a medication prescribed for a lower pain scale.  If given for pain, use the following pain scale:  Mild Pain = Pain Score of 1-3, CPOT 1-2  Moderate Pain = Pain Score of 4-6, CPOT 3-4  Severe Pain = Pain Score of 7-10, CPOT 5-8    4897-Given               insulin lispro (HUMALOG/ADMELOG) injection 2-7 Units  Dose: 2-7  "Units  Freq: 4 Times Daily Before Meals & Nightly Route: SC  Start: 12/05/23 1730   Admin Instructions:   Correction Insulin - Low Dose - Total Insulin Dose Less Than 40 units/day (Lean, Elderly or Renal Patients)    Blood Glucose 150-199 mg/dL - 2 units  Blood Glucose 200-249 mg/dL - 3 units  Blood Glucose 250-299 mg/dL - 4 units  Blood Glucose 300-349 mg/dL - 5 units  Blood Glucose 350-400 mg/dL - 6 units  Blood Glucose Greater Than 400 mg/dL - 7 units & Call Provider   Caution: Look alike/sound alike drug alert    1730     2155-Hold         (0831)-Not Given     (1215)-Not Given     1817-Given       2052-Given          0759-Given     1130 1730 2100             losartan (COZAAR) tablet 25 mg  Dose: 25 mg  Freq: Every 24 Hours Scheduled Route: PO  Start: 12/05/23 1521   Admin Instructions:   Hold for SBP less than 100, DBP less than 60    1629-Hold          (1011)-Not Given          0800-Given             nebivolol (BYSTOLIC) tablet 2.5 mg  Dose: 2.5 mg  Freq: Every 24 Hours Scheduled Route: PO  Start: 12/05/23 1521   Admin Instructions:   Hold for SBP less than 100, DBP less than 60, or heart rate less than 50    1628-Hold          1011-Given          0800-Given             OLANZapine (zyPREXA) tablet 7.5 mg  Dose: 7.5 mg  Freq: 2 Times Daily Route: PO  Start: 12/05/23 2100   Admin Instructions:   Caution: Look alike/sound alike drug alert    2154-Given          1011-Given     2054-Given         0801-Given     2100            ondansetron (ZOFRAN) injection 4 mg  Dose: 4 mg  Freq: Every 6 Hours PRN Route: IV  PRN Reasons: Nausea,Vomiting  Start: 12/05/23 1504   Admin Instructions:   \"If multiple N/V medications ordered, use in the following order: Ondansetron, Prochlorperazine, Promethazine. Use PO unless patient refuses or patient unable to swallow.\"    1736-Given               pantoprazole (PROTONIX) EC tablet 40 mg  Dose: 40 mg  Freq: Every Early Morning Route: PO  Start: 12/06/23 0600   Admin " "Instructions:   Do not crush or chew the capsules or tablets. The drug may not work as designed if the capsule or tablet is crushed or chewed. Swallow whole.  Swallow whole; do not crush, split, or chew.     0516-Given          0806-Given             PARoxetine (PAXIL) tablet 10 mg  Dose: 10 mg  Freq: Daily Route: PO  Indications of Use: MAJOR DEPRESSIVE DISORDER  Start: 12/05/23 1521   Admin Instructions:   Do Not Crush.  Group 2 ([place]) Hazardous Drug - Reproductive Risk Only - See Handling Guide    1629-Hold          1011-Given          0800-Given             sodium chloride 0.9 % flush 10 mL  Dose: 10 mL  Freq: As Needed Route: IV  PRN Reason: Line Care  Start: 12/05/23 1233          sodium chloride 0.9 % flush 10 mL  Dose: 10 mL  Freq: As Needed Route: IV  PRN Reason: Line Care  Start: 12/05/23 1231         Completed Medications  Medications 12/05/23 12/06/23 12/07/23       dextrose (D50W) (25 g/50 mL) IV injection 25 g  Dose: 25 g  Freq: Once Route: IV  Start: 12/05/23 1243   End: 12/05/23 1229    1229-Given               HYDROmorphone (DILAUDID) injection 0.25 mg  Dose: 0.25 mg  Freq: Once Route: IV  Start: 12/05/23 1253   End: 12/05/23 1329   Admin Instructions:   Based on patient request - if ordered for moderate or severe pain, provider allows for administration of a medication prescribed for a lower pain scale.  If given for pain, use the following pain scale:  Mild Pain = Pain Score of 1-3, CPOT 1-2  Moderate Pain = Pain Score of 4-6, CPOT 3-4  Severe Pain = Pain Score of 7-10, CPOT 5-8    1329-Given               ondansetron (ZOFRAN) injection 4 mg  Dose: 4 mg  Freq: Once Route: IV  Start: 12/05/23 1253   End: 12/05/23 1329   Admin Instructions:   \"If multiple N/V medications ordered, use in the following order: Ondansetron, Prochlorperazine, Promethazine. Use PO unless patient refuses or patient unable to swallow.\"    1329-Given              Discontinued Medications  Medications 12/05/23 12/06/23 " 12/07/23       acetaminophen (TYLENOL) tablet 650 mg  Dose: 650 mg  Freq: Every 6 Hours Route: PO  Start: 12/05/23 1521   End: 12/05/23 1506   Admin Instructions:   If given for fever, use fever parameter: fever greater than 100.4 °F  Based on patient request - if ordered for moderate or severe pain, provider allows for administration of a medication prescribed for a lower pain scale.    Do not exceed 4 grams of acetaminophen in a 24 hr period. Max dose of 2gm for AST/ALT greater than 120 units/L.    If given for pain, use the following pain scale:   Mild Pain = Pain Score of 1-3, CPOT 1-2  Moderate Pain = Pain Score of 4-6, CPOT 3-4  Severe Pain = Pain Score of 7-10, CPOT 5-8          dextrose (D50W) (25 g/50 mL) IV injection 25 g  Dose: 25 g  Freq: Once Route: IV  Start: 12/05/23 1522   End: 12/05/23 1506          insulin lispro (HUMALOG/ADMELOG) injection 0-7 Units  Dose: 0-7 Units  Freq: 4 Times Daily With Meals & Nightly Route: SC  Start: 12/05/23 1800   End: 12/05/23 1517   Admin Instructions:    Solution should be clear. If cloudy, contact pharmacy for a new vial. Scheduled mealtime doses of this medication should be held if patient NPO.   Caution: Look alike/sound alike drug alert          losartan (COZAAR) tablet 100 mg  Dose: 100 mg  Freq: Every 24 Hours Scheduled Route: PO  Start: 12/05/23 1521   End: 12/05/23 1506   Admin Instructions:   Hold for SBP less than 100, DBP less than 60          povidone-iodine (BETADINE) external solution 10% 1 application   Dose: 1 application   Freq: Daily Route: TOP  Start: 12/05/23 1521   End: 12/05/23 1505   Admin Instructions:                        Operative/Procedure Notes (all)    No notes of this type exist for this encounter.          Physician Progress Notes (all)        Mustapha Mahan MD at 12/06/23 0924          Daily progress note    Primary care physician      Subjective  Doing same with no new issues but remained pleasantly confused    History of  "present illness  78-year-old white female who is well-known to our service with history of severe dementia CVA diabetes hypertension hyperlipidemia and coronary artery disease who is a nursing home resident brought to the emergency room and found to have right tibia-fibula fracture.  Patient is demented unable to give detailed history and most of the history obtained from the chart nursing staff old record and I know the patient from previous admission.  Patient denies any fall trauma injury but hurting at the right lower extremity more than usual for last few days.     REVIEW OF SYSTEMS  All systems reviewed and negative except for those discussed in HPI.      PHYSICAL EXAM  Blood pressure 120/63, pulse 89, temperature 97.5 °F (36.4 °C), temperature source Oral, resp. rate 18, height 165.1 cm (65\"), weight 74.4 kg (164 lb), SpO2 96%.    GENERAL: Awake and alert no acute distress  HENT: nares patent, scalp is atraumatic  EYES: no scleral icterus  CV: regular rhythm, normal rate, 2+ edema to the feet bilaterally  RESPIRATORY: normal effort, clear to auscultation bilaterally  ABDOMEN: soft, nontender nondistended bowel sounds positive  MUSCULOSKELETAL: Tenderness to the right tib-fib, no pain to the left leg  NEURO: alert, moves all extremities, follows commands  PSYCH:  calm, cooperative  SKIN: warm, dry     LAB RESULTS  Lab Results (last 24 hours)       Procedure Component Value Units Date/Time    CANDIDA AURIS SCREEN - Swab, Axilla Right, Axilla Left and Groin [[id number]] Collected: 12/06/23 1158    Specimen: Swab from Axilla Right, Axilla Left and Groin Updated: 12/06/23 1225    POC Glucose Once [[id number]]  (Normal) Collected: 12/06/23 1103    Specimen: Blood Updated: 12/06/23 1105     Glucose 105 mg/dL     POC Glucose Once [[id number]]  (Normal) Collected: 12/06/23 0729    Specimen: Blood Updated: 12/06/23 0730     Glucose 120 mg/dL     Hemoglobin A1c [[id number]]  (Abnormal) Collected: 12/06/23 4429    Specimen: " Blood Updated: 12/06/23 0510     Hemoglobin A1C 9.70 %     Narrative:      Hemoglobin A1C Ranges:    Increased Risk for Diabetes  5.7% to 6.4%  Diabetes                     >= 6.5%  Diabetic Goal                < 7.0%    POC Glucose Once [[id number]]  (Abnormal) Collected: 12/06/23 0459    Specimen: Blood Updated: 12/06/23 0500     Glucose 173 mg/dL     Lipid Panel [[id number]]  (Abnormal) Collected: 12/06/23 0320    Specimen: Blood Updated: 12/06/23 0401     Total Cholesterol 125 mg/dL      Triglycerides 70 mg/dL      HDL Cholesterol 34 mg/dL      LDL Cholesterol  77 mg/dL      VLDL Cholesterol 14 mg/dL      LDL/HDL Ratio 2.26    Narrative:      Cholesterol Reference Ranges  (U.S. Department of Health and Human Services ATP III Classifications)    Desirable          <200 mg/dL  Borderline High    200-239 mg/dL  High Risk          >240 mg/dL      Triglyceride Reference Ranges  (U.S. Department of Health and Human Services ATP III Classifications)    Normal           <150 mg/dL  Borderline High  150-199 mg/dL  High             200-499 mg/dL  Very High        >500 mg/dL    HDL Reference Ranges  (U.S. Department of Health and Human Services ATP III Classifications)    Low     <40 mg/dl (major risk factor for CHD)  High    >60 mg/dl ('negative' risk factor for CHD)        LDL Reference Ranges  (U.S. Department of Health and Human Services ATP III Classifications)    Optimal          <100 mg/dL  Near Optimal     100-129 mg/dL  Borderline High  130-159 mg/dL  High             160-189 mg/dL  Very High        >189 mg/dL    TSH [[id number]]  (Normal) Collected: 12/06/23 0320    Specimen: Blood Updated: 12/06/23 0359     TSH 1.020 uIU/mL     CBC & Differential [[id number]]  (Abnormal) Collected: 12/06/23 0335    Specimen: Blood Updated: 12/06/23 0342    Narrative:      The following orders were created for panel order CBC & Differential.  Procedure                               Abnormality         Status                      ---------                               -----------         ------                     CBC Auto Differential[[id number]]        Abnormal            Final result                 Please view results for these tests on the individual orders.    CBC Auto Differential [[id number]]  (Abnormal) Collected: 12/06/23 0335    Specimen: Blood Updated: 12/06/23 0342     WBC 7.58 10*3/mm3      RBC 4.05 10*6/mm3      Hemoglobin 11.8 g/dL      Hematocrit 35.9 %      MCV 88.6 fL      MCH 29.1 pg      MCHC 32.9 g/dL      RDW 13.7 %      RDW-SD 44.4 fl      MPV 9.4 fL      Platelets 220 10*3/mm3      Neutrophil % 64.2 %      Lymphocyte % 20.6 %      Monocyte % 7.3 %      Eosinophil % 5.7 %      Basophil % 0.9 %      Immature Grans % 1.3 %      Neutrophils, Absolute 4.87 10*3/mm3      Lymphocytes, Absolute 1.56 10*3/mm3      Monocytes, Absolute 0.55 10*3/mm3      Eosinophils, Absolute 0.43 10*3/mm3      Basophils, Absolute 0.07 10*3/mm3      Immature Grans, Absolute 0.10 10*3/mm3      nRBC 0.1 /100 WBC     POC Glucose Once [[id number]]  (Abnormal) Collected: 12/05/23 2328    Specimen: Blood Updated: 12/05/23 2330     Glucose 210 mg/dL     POC Glucose Once [[id number]]  (Abnormal) Collected: 12/05/23 2238    Specimen: Blood Updated: 12/05/23 2239     Glucose 65 mg/dL     POC Glucose Once [[id number]]  (Abnormal) Collected: 12/05/23 2028    Specimen: Blood Updated: 12/05/23 2029     Glucose 62 mg/dL     BNP [[id number]]  (Normal) Collected: 12/05/23 1232    Specimen: Blood Updated: 12/05/23 1527     proBNP 517.0 pg/mL     Narrative:      This assay is used as an aid in the diagnosis of individuals suspected of having heart failure. It can be used as an aid in the diagnosis of acute decompensated heart failure (ADHF) in patients presenting with signs and symptoms of ADHF to the emergency department (ED). In addition, NT-proBNP of <300 pg/mL indicates ADHF is not likely.    Age Range Result Interpretation  NT-proBNP Concentration  (pg/mL:      <50             Positive            >450                   Gray                 300-450                    Negative             <300    50-75           Positive            >900                  Gray                300-900                  Negative            <300      >75             Positive            >1800                  Gray                300-1800                  Negative            <300    POC Glucose Once [[id number]]  (Normal) Collected: 12/05/23 1416    Specimen: Blood Updated: 12/05/23 1417     Glucose 126 mg/dL           Imaging Results (Last 24 Hours)       Procedure Component Value Units Date/Time    CT Lower Extremity Right Without Contrast [[id number]] Collected: 12/05/23 2101     Updated: 12/05/23 2108    Narrative:      NONCONTRAST CT RIGHT LOWER LEG     HISTORY: Evaluate fractures. Pain.     TECHNIQUE: Noncontrast CT of the right lower leg is provided and  correlated with x-rays from earlier today.     FINDINGS: There is diffuse soft tissue edema. The bones appear  demineralized.     Obliquely oriented distal tibial diametaphysis fracture is minimally  impacted. There is also a nondisplaced oblique distal fibular  diametaphysis fracture. No evidence of underlying bone lesion. No  intra-articular extension.     No other fracture identified. Musculature of the leg is deconditioned  but there is no focal mass lesion or hematoma.     Radiation dose reduction techniques were utilized, including automated  exposure control and exposure modulation based on body size.       Impression:      Diffuse demineralization with distal right tibia and fibular  fractures similar as observed on x-ray earlier in the day. No  intra-articular extension.     This report was finalized on 12/5/2023 9:05 PM by Dr. Ajit Day M.D on Workstation: [id number]       CT Pelvis Without Contrast [[id number]] Collected: 12/05/23 1731     Updated: 12/05/23 1819    Narrative:      PELVIS CT WITHOUT CONTRAST      HISTORY: Right hip pain. Evaluate for fracture.     TECHNIQUE: Axial CT of the pelvis and proximal femurs is provided.  Correlation with x-rays from earlier today.        Radiation dose reduction techniques were utilized, including automated  exposure control and exposure modulation based on body size.     FINDINGS: The bones are diffusely demineralized. Advanced degenerative  disc change is observed at L4-5 and L5-S1 with no evidence of fracture  in the visualized lower lumbar spine, pelvis, or the proximal femurs.     There is asymmetric soft tissue edema partially visualized along the  lateral right thigh in the mid thigh region. No focal hematoma or muscle  injury is evident. Musculature around the pelvis and thighs is diffusely  deconditioned.     There is mild, chronic joint space narrowing at both hips and mild  degenerative change at the sacroiliac joints and symphysis pubis. No  intrapelvic free fluid or lesion.       Impression:      No acute fracture identified around the pelvis or the  proximal femurs. There is asymmetric soft tissue edema in the right  thigh.      This report was finalized on 12/5/2023 6:16 PM by Dr. Ajit Day M.D on Workstation: [id number]       XR Ankle 3+ View Right [[id number]] Collected: 12/05/23 1508     Updated: 12/05/23 1644    Narrative:      X-RAY RIGHT ANKLE     HISTORY: Ankle pain. Fracture.     3 x-rays of the right ankle are correlated with lower leg x-rays from  earlier today.     FINDINGS: The bones are diffusely demineralized. There are fractures of  the distal tibia and fibula extending obliquely across the distal tibial  diametaphysis with minimal impaction and obliquely across the distal  fibular diametaphysis without displacement or impaction. There is  diffuse soft tissue edema.     The visualized bones in the foot are intact.       Impression:      The bones appear profoundly demineralized and there are  acute appearing distal right tibia and fibula  diametaphysis fractures.     This report was finalized on 12/5/2023 4:41 PM by Dr. Ajit Day M.D on Workstation: [id number]             ASSESSMENT  Right distal tibia/fibula fracture  Probable right femoral neck fracture  Severe dementia  Insulin-dependent diabetes mellitus  Hypertension  Hyperlipidemia  History of CVA  Gastroesophageal reflux disease    PLAN  CPM  Bedrest  Pain management  Orthopedic surgery consult pending  Adjust nursing home medications  Stress ulcer DVT prophylaxis   Supportive care  Discussed with nursing staff  Follow closely further recommendation current hospital course    ISABELL SANDHU MD    Copied text in this note has been reviewed and is accurate as of 12/06/23          Electronically signed by Isabell Sandhu MD at 12/06/23 1356          Mary Pineda, RN, CWON   Registered Nurse  Wound Care     Nursing Note      Signed     Date of Service: 12/06/23 0949  Creation Time: 12/06/23 1023     Signed              12/06/23 0949   Wound 12/05/23 1954 Bilateral gluteal   Placement Date/Time: 12/05/23 1954   Present on Original Admission: Yes  Side: Bilateral  Location: gluteal   Pressure Injury Stage 2   Base    (scattered pink partial thickness skin loss to kamala buttocks)   Periwound intact;blanchable;yeast;pink   Periwound Temperature warm   Drainage Characteristics/Odor serosanguineous   Drainage Amount scant   Care, Wound    (peeled Optifoam dressing back to assess the skin.)   Skin Interventions   Pressure Reduction Devices pressure-redistributing mattress utilized   Pressure Reduction Techniques pressure points protected;positioned off wounds;heels elevated off bed   Skin Protection silicone foam dressing in place      CWON note: pt seen for evaluation of skin issues POA. Pt is confused and uncooperative at times, difficult to turn and reposition due to bulky splint on RLE/ pain associated with this. She is incontinent of bowel and bladder. I have ordered pt a TIN mattress for adequate  pressure redistribution and to assist with control of microclimate. Sacral skin as stated above; in addition, she has IAD with yeast component. Magic Barrier cream has been ordered to alternate with zinc barrier cream, do not use cream under the Optifoam dressing. Left heel is boggy with slowly blanchable erythema. Venelex ointment and heel boot have been ordered. Wound care and prevention standing orders have been placed into [facility]. Discussed with RN. Please re-consult for any additional needs.                     Saida Leon RN   Registered Nurse  Orthopedics     Progress Notes      Cosign Needed     Date of Service: 12/07/23 1130  Creation Time: 12/07/23 1130     Cosign Needed       Expand All Collapse All                Orthopedic Progress Note        Patient: Darby Workman     YOB: 1945     Medical Record Number: [id number]     Attending Physician: Mustapha Mahan MD     Date of Admission: 12/5/2023 12:09 PM     Admitting Dx:  Hypoglycemia [E16.2]  Closed fracture of right distal tibia [S82.301A]  Closed fracture of distal end of right tibia, unspecified fracture morphology, initial encounter [S82.301A]  Tibia/fibula fracture [S82.209A, S82.409A]     Current Problem List:   Closed fracture of right distal tibia    Tibia/fibula fracture        Medical History        Past Medical History:   Diagnosis Date    Acute cerebrovascular accident (CVA) of cerebellum      Acute ischemic stroke      Arthritis      Coronary artery disease      CVA (cerebral vascular accident)      Diabetes mellitus      Heart attack      History of blood clots      Hyperlipidemia      Hypertension      Vision loss              Current Medications:  Scheduled Meds:aspirin, 81 mg, Oral, Daily  brimonidine, 1 drop, Both Eyes, BID  castor oil-balsam peru, 1 application , Topical, Q12H  divalproex, 125 mg, Oral, Daily  dorzolamide, 1 drop, Both Eyes, BID  hydrocortisone-bacitracin-zinc oxide-nystatin, 1 application ,  Topical, TID  insulin lispro, 2-7 Units, Subcutaneous, 4x Daily AC & at Bedtime  losartan, 25 mg, Oral, Q24H  nebivolol, 2.5 mg, Oral, Q24H  OLANZapine, 7.5 mg, Oral, BID  pantoprazole, 40 mg, Oral, Q AM  PARoxetine, 10 mg, Oral, Daily        PRN Meds:.  acetaminophen    clonazePAM    dextrose    dextrose    glucagon (human recombinant)    HYDROcodone-acetaminophen    HYDROmorphone    ondansetron    sodium chloride    [COMPLETED] Insert Peripheral IV **AND** sodium chloride     SUBJECTIVE: 78 y.o.  female     OBJECTIVE:   Vitals          Vitals:     12/06/23 1700 12/06/23 2035 12/07/23 0532 12/07/23 0959   BP: 134/97 102/60 127/55 111/68   BP Location: Left arm Left arm Left arm Left arm   Patient Position: Lying Lying Lying Lying   Pulse: 83 106 92 107   Resp: 18 16 16 18   Temp: 98.2 °F (36.8 °C) 97.9 °F (36.6 °C) 97.9 °F (36.6 °C) 97.5 °F (36.4 °C)   TempSrc: Oral Oral Oral Oral   SpO2: 96% 95% 92% 96%   Weight:           Height:                 I/O last 3 completed shifts:  In: 480 [P.O.:480]  Out: 1450 [Urine:1450]     Diagnostic Tests:  Lab Results (last 72 hours)         Procedure Component Value Units Date/Time     POC Glucose Once [[id number]]  (Abnormal) Collected: 12/07/23 0721     Specimen: Blood Updated: 12/07/23 0723       Glucose 291 mg/dL       Basic Metabolic Panel [[id number]]  (Abnormal) Collected: 12/07/23 0513     Specimen: Blood Updated: 12/07/23 0557       Glucose 138 mg/dL         BUN 15 mg/dL         Creatinine 0.83 mg/dL         Sodium 135 mmol/L         Potassium 3.6 mmol/L         Comment: Slight hemolysis detected by analyzer. Result may be falsely elevated.          Chloride 102 mmol/L         CO2 24.6 mmol/L         Calcium 8.2 mg/dL         BUN/Creatinine Ratio 18.1       Anion Gap 8.4 mmol/L         eGFR 71.8 mL/min/1.73       Narrative:       GFR Normal >60  Chronic Kidney Disease <60  Kidney Failure <15     The GFR formula is only valid for adults with stable renal function between  ages 18 and 70.     CBC & Differential [[id number]]  (Abnormal) Collected: 12/07/23 0513     Specimen: Blood Updated: 12/07/23 0545     Narrative:       The following orders were created for panel order CBC & Differential.  Procedure                               Abnormality         Status                     ---------                               -----------         ------                     CBC Auto Differential[[id number]]        Abnormal            Final result                  Please view results for these tests on the individual orders.     CBC Auto Differential [[id number]]  (Abnormal) Collected: 12/07/23 0513     Specimen: Blood Updated: 12/07/23 0545       WBC 7.86 10*3/mm3         RBC 4.00 10*6/mm3         Hemoglobin 11.7 g/dL         Hematocrit 35.4 %         MCV 88.5 fL         MCH 29.3 pg         MCHC 33.1 g/dL         RDW 13.7 %         RDW-SD 43.5 fl         MPV 9.4 fL         Platelets 244 10*3/mm3         Neutrophil % 76.0 %         Lymphocyte % 13.7 %         Monocyte % 6.0 %         Eosinophil % 3.2 %         Basophil % 0.5 %         Immature Grans % 0.6 %         Neutrophils, Absolute 5.97 10*3/mm3         Lymphocytes, Absolute 1.08 10*3/mm3         Monocytes, Absolute 0.47 10*3/mm3         Eosinophils, Absolute 0.25 10*3/mm3         Basophils, Absolute 0.04 10*3/mm3         Immature Grans, Absolute 0.05 10*3/mm3         nRBC 0.0 /100 WBC       POC Glucose Once [[id number]]  (Abnormal) Collected: 12/07/23 0055     Specimen: Blood Updated: 12/07/23 0057       Glucose 151 mg/dL       POC Glucose Once [[id number]]  (Abnormal) Collected: 12/06/23 2005     Specimen: Blood Updated: 12/06/23 2007       Glucose 227 mg/dL       POC Glucose Once [[id number]]  (Abnormal) Collected: 12/06/23 1645     Specimen: Blood Updated: 12/06/23 1646       Glucose 234 mg/dL       [facility] A[facility]IS SCREEN - Swab, Axilla Right, Axilla Left and Groin [[id number]] Collected: 12/06/23 1158     Specimen: Swab from Axilla Right, Axilla  Left and Groin Updated: 12/06/23 1225     POC Glucose Once [[id number]]  (Normal) Collected: 12/06/23 1103     Specimen: Blood Updated: 12/06/23 1105       Glucose 105 mg/dL       POC Glucose Once [[id number]]  (Normal) Collected: 12/06/23 0729     Specimen: Blood Updated: 12/06/23 0730       Glucose 120 mg/dL       Hemoglobin A1c [[id number]]  (Abnormal) Collected: 12/06/23 0359     Specimen: Blood Updated: 12/06/23 0510       Hemoglobin A1C 9.70 %       Narrative:       Hemoglobin A1C Ranges:     Increased Risk for Diabetes  5.7% to 6.4%  Diabetes                     >= 6.5%  Diabetic Goal                < 7.0%     POC Glucose Once [[id number]]  (Abnormal) Collected: 12/06/23 0459     Specimen: Blood Updated: 12/06/23 0500       Glucose 173 mg/dL       Lipid Panel [[id number]]  (Abnormal) Collected: 12/06/23 0320     Specimen: Blood Updated: 12/06/23 0401       Total Cholesterol 125 mg/dL         Triglycerides 70 mg/dL         HDL Cholesterol 34 mg/dL         LDL Cholesterol  77 mg/dL         VLDL Cholesterol 14 mg/dL         LDL/HDL Ratio 2.26     Narrative:       Cholesterol Reference Ranges  ([place] Department of Health and Human Services ATP III Classifications)     Desirable          <200 mg/dL  Borderline High    200-239 mg/dL  High Risk          >240 mg/dL        Triglyceride Reference Ranges  (U.S. Department of Health and Human Services ATP III Classifications)     Normal           <150 mg/dL  Borderline High  150-199 mg/dL  High             200-499 mg/dL  Very High        >500 mg/dL     HDL Reference Ranges  (U.S. Department of Health and Human Services ATP III Classifications)     Low     <40 mg/dl (major risk factor for CHD)  High    >60 mg/dl ('negative' risk factor for CHD)           LDL Reference Ranges  (U.S. Department of Health and Human Services ATP III Classifications)     Optimal          <100 mg/dL  Near Optimal     100-129 mg/dL  Borderline High  130-159 mg/dL  High             160-189 mg/dL  Very  High        >189 mg/dL     TSH [[id number]]  (Normal) Collected: 12/06/23 0320     Specimen: Blood Updated: 12/06/23 0359       TSH 1.020 uIU/mL       CBC & Differential [[id number]]  (Abnormal) Collected: 12/06/23 0335     Specimen: Blood Updated: 12/06/23 0342     Narrative:       The following orders were created for panel order CBC & Differential.  Procedure                               Abnormality         Status                     ---------                               -----------         ------                     CBC Auto Differential[[id number]]        Abnormal            Final result                  Please view results for these tests on the individual orders.     CBC Auto Differential [[id number]]  (Abnormal) Collected: 12/06/23 0335     Specimen: Blood Updated: 12/06/23 0342       WBC 7.58 10*3/mm3         RBC 4.05 10*6/mm3         Hemoglobin 11.8 g/dL         Hematocrit 35.9 %         MCV 88.6 fL         MCH 29.1 pg         MCHC 32.9 g/dL         RDW 13.7 %         RDW-SD 44.4 fl         MPV 9.4 fL         Platelets 220 10*3/mm3         Neutrophil % 64.2 %         Lymphocyte % 20.6 %         Monocyte % 7.3 %         Eosinophil % 5.7 %         Basophil % 0.9 %         Immature Grans % 1.3 %         Neutrophils, Absolute 4.87 10*3/mm3         Lymphocytes, Absolute 1.56 10*3/mm3         Monocytes, Absolute 0.55 10*3/mm3         Eosinophils, Absolute 0.43 10*3/mm3         Basophils, Absolute 0.07 10*3/mm3         Immature Grans, Absolute 0.10 10*3/mm3         nRBC 0.1 /100 WBC       POC Glucose Once [[id number]]  (Abnormal) Collected: 12/05/23 2328     Specimen: Blood Updated: 12/05/23 2330       Glucose 210 mg/dL       POC Glucose Once [[id number]]  (Abnormal) Collected: 12/05/23 2238     Specimen: Blood Updated: 12/05/23 2239       Glucose 65 mg/dL       POC Glucose Once [[id number]]  (Abnormal) Collected: 12/05/23 2028     Specimen: Blood Updated: 12/05/23 2029       Glucose 62 mg/dL       BNP [[id number]]   (Normal) Collected: 12/05/23 1232     Specimen: Blood Updated: 12/05/23 1527       proBNP 517.0 pg/mL       Narrative:       This assay is used as an aid in the diagnosis of individuals suspected of having heart failure. It can be used as an aid in the diagnosis of acute decompensated heart failure (ADHF) in patients presenting with signs and symptoms of ADHF to the emergency department (ED). In addition, NT-proBNP of <300 pg/mL indicates ADHF is not likely.     Age Range         Result Interpretation  NT-proBNP Concentration (pg/mL:        <50             Positive            >450                         [place]                           300-450                           Negative               <300     50-75           Positive            >900                  Gray                300-900                  Negative            <300        >75             Positive            >1800                  Gray                300-1800                  Negative            <300     POC Glucose Once [[id number]]  (Normal) Collected: 12/05/23 1416     Specimen: Blood Updated: 12/05/23 1417       Glucose 126 mg/dL       [place] Draw [[id number]] Collected: 12/05/23 1232     Specimen: Blood Updated: 12/05/23 1345     Narrative:       The following orders were created for panel order [place] Draw.  Procedure                               Abnormality         Status                     ---------                               -----------         ------                     Green Top (Gel)[[id number]]                                  Final result               Lavender Top[[id number]]                                     Final result               Light Blue Top[[id number]]                                   Final result                  Please view results for these tests on the individual orders.     Green Top (Gel) [[id number]] Collected: 12/05/23 1232     Specimen: Blood Updated: 12/05/23 1345       Extra Tube Hold for add-ons.       Comment: Auto  resulted.        Lavender Top [[id number]] Collected: 12/05/23 1232     Specimen: Blood Updated: 12/05/23 1345       Extra Tube hold for add-on       Comment: Auto resulted        Light Blue Top [[id number]] Collected: 12/05/23 1232     Specimen: Blood Updated: 12/05/23 1345       Extra Tube Hold for add-ons.       Comment: Auto resulted        Basic Metabolic Panel [[id number]]  (Abnormal) Collected: 12/05/23 1232     Specimen: Blood Updated: 12/05/23 1342       Glucose 57 mg/dL         BUN 18 mg/dL         Creatinine 0.71 mg/dL         Sodium 135 mmol/L         Potassium 4.0 mmol/L         Comment: Specimen hemolyzed.  Result may be falsely elevated.          Chloride 98 mmol/L         CO2 29.8 mmol/L         Calcium 8.9 mg/dL         BUN/Creatinine Ratio 25.4       Anion Gap 7.2 mmol/L         eGFR 86.6 mL/min/1.73       Narrative:       GFR Normal >60  Chronic Kidney Disease <60  Kidney Failure <15     The GFR formula is only valid for adults with stable renal function between ages 18 and 70.     CBC & Differential [[id number]]  (Abnormal) Collected: 12/05/23 1232     Specimen: Blood Updated: 12/05/23 1258     Narrative:       The following orders were created for panel order CBC & Differential.  Procedure                               Abnormality         Status                     ---------                               -----------         ------                     CBC Auto Differential[[id number]]        Abnormal            Final result                  Please view results for these tests on the individual orders.     CBC Auto Differential [[id number]]  (Abnormal) Collected: 12/05/23 1232     Specimen: Blood Updated: 12/05/23 1258       WBC 8.95 10*3/mm3         RBC 4.07 10*6/mm3         Hemoglobin 11.8 g/dL         Hematocrit 36.3 %         MCV 89.2 fL         MCH 29.0 pg         MCHC 32.5 g/dL         RDW 14.2 %         RDW-SD 46.1 fl         MPV 9.1 fL         Platelets 270 10*3/mm3         Neutrophil % 77.8 %          Lymphocyte % 13.3 %         Monocyte % 6.6 %         Eosinophil % 1.5 %         Basophil % 0.4 %         Immature Grans % 0.4 %         Neutrophils, Absolute 6.96 10*3/mm3         Lymphocytes, Absolute 1.19 10*3/mm3         Monocytes, Absolute 0.59 10*3/mm3         Eosinophils, Absolute 0.13 10*3/mm3         Basophils, Absolute 0.04 10*3/mm3         Immature Grans, Absolute 0.04 10*3/mm3         nRBC 0.0 /100 WBC       POC Glucose Once [[id number]]  (Abnormal) Collected: 12/05/23 1224     Specimen: Blood Updated: 12/05/23 1226       Glucose 56 mg/dL                  PHYSICAL EXAM:Able to answer yes and no questions appropriately.  Otherwise speech is incoherent.  Patient does have a history of severe dementia.  She has been pulling in her sheets and is obviously been pulling at her posterior splint on that right lower extremity and is completely torn it up.  Have discussed with Dr. Chavez and will plan to put her in a fracture boot.     ASSESSMENT & PLAN:  Best treatment for the patient would be to put her in a cast however because of her severe dementia and her knee contracture it would be very difficult to get her in a cast.  Patient also would have increased risk for skin breakdown in a cast.  For now we will try to put her in a fracture boot to see if she will leave that alone.  Patient is bedbound and will need to be strict nonweightbearing on the right lower extremity..     Plan transfer back to skilled nursing facility anytime from an orthopedic standpoint.   Return to the office in 3 to 4 weeks for repeat x-ray     Date: 12/7/2023     Saida Leon RN

## 2023-12-07 NOTE — PLAN OF CARE
Goal Outcome Evaluation:  Plan of Care Reviewed With: patient   Pt alert to self only, vss, specialty mattress in use, q2 hour turn, waffle boot on LLE, meds crushed in applesauce, accucheck ACHS and SSI, boot placed on RLE, voiding per pw, plans to d/c to [facility] 12/8 @ 1700 via EMS, unable to educate d/t confusion.     Progress: no change

## 2023-12-07 NOTE — PROGRESS NOTES
Orthopedic Progress Note      Patient: Darby Workman    YOB: 1945    Medical Record Number: [id number]    Attending Physician: Mustapha Mahan MD    Date of Admission: 12/5/2023 12:09 PM    Admitting Dx:  Hypoglycemia [E16.2]  Closed fracture of right distal tibia [S82.301A]  Closed fracture of distal end of right tibia, unspecified fracture morphology, initial encounter [S82.301A]  Tibia/fibula fracture [S82.209A, S82.409A]    Current Problem List:   Closed fracture of right distal tibia    Tibia/fibula fracture      Past Medical History:   Diagnosis Date    Acute cerebrovascular accident (CVA) of cerebellum     Acute ischemic stroke     Arthritis     Coronary artery disease     CVA (cerebral vascular accident)     Diabetes mellitus     Heart attack     History of blood clots     Hyperlipidemia     Hypertension     Vision loss        Current Medications:  Scheduled Meds:aspirin, 81 mg, Oral, Daily  brimonidine, 1 drop, Both Eyes, BID  castor oil-balsam peru, 1 application , Topical, Q12H  divalproex, 125 mg, Oral, Daily  dorzolamide, 1 drop, Both Eyes, BID  hydrocortisone-bacitracin-zinc oxide-nystatin, 1 application , Topical, TID  insulin lispro, 2-7 Units, Subcutaneous, 4x Daily AC & at Bedtime  losartan, 25 mg, Oral, Q24H  nebivolol, 2.5 mg, Oral, Q24H  OLANZapine, 7.5 mg, Oral, BID  pantoprazole, 40 mg, Oral, Q AM  PARoxetine, 10 mg, Oral, Daily      PRN Meds:.  acetaminophen    clonazePAM    dextrose    dextrose    glucagon (human recombinant)    HYDROcodone-acetaminophen    HYDROmorphone    ondansetron    sodium chloride    [COMPLETED] Insert Peripheral IV **AND** sodium chloride    SUBJECTIVE: 78 y.o.  female    OBJECTIVE:   Vitals:    12/06/23 1700 12/06/23 2035 12/07/23 0532 12/07/23 0959   BP: 134/97 102/60 127/55 111/68   BP Location: Left arm Left arm Left arm Left arm   Patient Position: Lying Lying Lying Lying   Pulse: 83 106 92 107   Resp: 18 16 16 18   Temp: 98.2 °F (36.8 °C)  97.9 °F (36.6 °C) 97.9 °F (36.6 °C) 97.5 °F (36.4 °C)   TempSrc: Oral Oral Oral Oral   SpO2: 96% 95% 92% 96%   Weight:       Height:         I/O last 3 completed shifts:  In: 480 [P.O.:480]  Out: 1450 [Urine:1450]    Diagnostic Tests:  Lab Results (last 72 hours)       Procedure Component Value Units Date/Time    POC Glucose Once [[id number]]  (Abnormal) Collected: 12/07/23 0721    Specimen: Blood Updated: 12/07/23 0723     Glucose 291 mg/dL     Basic Metabolic Panel [[id number]]  (Abnormal) Collected: 12/07/23 0513    Specimen: Blood Updated: 12/07/23 0557     Glucose 138 mg/dL      BUN 15 mg/dL      Creatinine 0.83 mg/dL      Sodium 135 mmol/L      Potassium 3.6 mmol/L      Comment: Slight hemolysis detected by analyzer. Result may be falsely elevated.        Chloride 102 mmol/L      CO2 24.6 mmol/L      Calcium 8.2 mg/dL      BUN/Creatinine Ratio 18.1     Anion Gap 8.4 mmol/L      eGFR 71.8 mL/min/1.73     Narrative:      GFR Normal >60  Chronic Kidney Disease <60  Kidney Failure <15    The GFR formula is only valid for adults with stable renal function between ages 18 and 70.    CBC & Differential [[id number]]  (Abnormal) Collected: 12/07/23 0513    Specimen: Blood Updated: 12/07/23 0545    Narrative:      The following orders were created for panel order CBC & Differential.  Procedure                               Abnormality         Status                     ---------                               -----------         ------                     CBC Auto Differential[[id number]]        Abnormal            Final result                 Please view results for these tests on the individual orders.    CBC Auto Differential [[id number]]  (Abnormal) Collected: 12/07/23 0513    Specimen: Blood Updated: 12/07/23 0545     WBC 7.86 10*3/mm3      RBC 4.00 10*6/mm3      Hemoglobin 11.7 g/dL      Hematocrit 35.4 %      MCV 88.5 fL      MCH 29.3 pg      MCHC 33.1 g/dL      RDW 13.7 %      RDW-SD 43.5 fl      MPV 9.4 fL       Platelets 244 10*3/mm3      Neutrophil % 76.0 %      Lymphocyte % 13.7 %      Monocyte % 6.0 %      Eosinophil % 3.2 %      Basophil % 0.5 %      Immature Grans % 0.6 %      Neutrophils, Absolute 5.97 10*3/mm3      Lymphocytes, Absolute 1.08 10*3/mm3      Monocytes, Absolute 0.47 10*3/mm3      Eosinophils, Absolute 0.25 10*3/mm3      Basophils, Absolute 0.04 10*3/mm3      Immature Grans, Absolute 0.05 10*3/mm3      nRBC 0.0 /100 WBC     POC Glucose Once [[id number]]  (Abnormal) Collected: 12/07/23 0055    Specimen: Blood Updated: 12/07/23 0057     Glucose 151 mg/dL     POC Glucose Once [[id number]]  (Abnormal) Collected: 12/06/23 2005    Specimen: Blood Updated: 12/06/23 2007     Glucose 227 mg/dL     POC Glucose Once [[id number]]  (Abnormal) Collected: 12/06/23 1645    Specimen: Blood Updated: 12/06/23 1646     Glucose 234 mg/dL     CANDIDA AURIS SCREEN - Swab, Axilla Right, Axilla Left and Groin [[id number]] Collected: 12/06/23 1158    Specimen: Swab from Axilla Right, Axilla Left and Groin Updated: 12/06/23 1225    POC Glucose Once [[id number]]  (Normal) Collected: 12/06/23 1103    Specimen: Blood Updated: 12/06/23 1105     Glucose 105 mg/dL     POC Glucose Once [[id number]]  (Normal) Collected: 12/06/23 0729    Specimen: Blood Updated: 12/06/23 0730     Glucose 120 mg/dL     Hemoglobin A1c [[id number]]  (Abnormal) Collected: 12/06/23 0359    Specimen: Blood Updated: 12/06/23 0510     Hemoglobin A1C 9.70 %     Narrative:      Hemoglobin A1C Ranges:    Increased Risk for Diabetes  5.7% to 6.4%  Diabetes                     >= 6.5%  Diabetic Goal                < 7.0%    POC Glucose Once [[id number]]  (Abnormal) Collected: 12/06/23 0459    Specimen: Blood Updated: 12/06/23 0500     Glucose 173 mg/dL     Lipid Panel [[id number]]  (Abnormal) Collected: 12/06/23 0320    Specimen: Blood Updated: 12/06/23 0401     Total Cholesterol 125 mg/dL      Triglycerides 70 mg/dL      HDL Cholesterol 34 mg/dL      LDL Cholesterol  77 mg/dL       VLDL Cholesterol 14 mg/dL      LDL/HDL Ratio 2.26    Narrative:      Cholesterol Reference Ranges  (U.S. Department of Health and Human Services ATP III Classifications)    Desirable          <200 mg/dL  Borderline High    200-239 mg/dL  High Risk          >240 mg/dL      Triglyceride Reference Ranges  (U.S. Department of Health and Human Services ATP III Classifications)    Normal           <150 mg/dL  Borderline High  150-199 mg/dL  High             200-499 mg/dL  Very High        >500 mg/dL    HDL Reference Ranges  (U.S. Department of Health and Human Services ATP III Classifications)    Low     <40 mg/dl (major risk factor for CHD)  High    >60 mg/dl ('negative' risk factor for CHD)        LDL Reference Ranges  (U.S. Department of Health and Human Services ATP III Classifications)    Optimal          <100 mg/dL  Near Optimal     100-129 mg/dL  Borderline High  130-159 mg/dL  High             160-189 mg/dL  Very High        >189 mg/dL    TSH [[id number]]  (Normal) Collected: 12/06/23 0320    Specimen: Blood Updated: 12/06/23 0359     TSH 1.020 uIU/mL     CBC & Differential [[id number]]  (Abnormal) Collected: 12/06/23 0335    Specimen: Blood Updated: 12/06/23 0342    Narrative:      The following orders were created for panel order CBC & Differential.  Procedure                               Abnormality         Status                     ---------                               -----------         ------                     CBC Auto Differential[[id number]]        Abnormal            Final result                 Please view results for these tests on the individual orders.    CBC Auto Differential [[id number]]  (Abnormal) Collected: 12/06/23 0335    Specimen: Blood Updated: 12/06/23 0342     WBC 7.58 10*3/mm3      RBC 4.05 10*6/mm3      Hemoglobin 11.8 g/dL      Hematocrit 35.9 %      MCV 88.6 fL      MCH 29.1 pg      MCHC 32.9 g/dL      RDW 13.7 %      RDW-SD 44.4 fl      MPV 9.4 fL      Platelets 220 10*3/mm3       Neutrophil % 64.2 %      Lymphocyte % 20.6 %      Monocyte % 7.3 %      Eosinophil % 5.7 %      Basophil % 0.9 %      Immature Grans % 1.3 %      Neutrophils, Absolute 4.87 10*3/mm3      Lymphocytes, Absolute 1.56 10*3/mm3      Monocytes, Absolute 0.55 10*3/mm3      Eosinophils, Absolute 0.43 10*3/mm3      Basophils, Absolute 0.07 10*3/mm3      Immature Grans, Absolute 0.10 10*3/mm3      nRBC 0.1 /100 WBC     POC Glucose Once [[id number]]  (Abnormal) Collected: 12/05/23 2328    Specimen: Blood Updated: 12/05/23 2330     Glucose 210 mg/dL     POC Glucose Once [[id number]]  (Abnormal) Collected: 12/05/23 2238    Specimen: Blood Updated: 12/05/23 2239     Glucose 65 mg/dL     POC Glucose Once [[id number]]  (Abnormal) Collected: 12/05/23 2028    Specimen: Blood Updated: 12/05/23 2029     Glucose 62 mg/dL     BNP [[id number]]  (Normal) Collected: 12/05/23 1232    Specimen: Blood Updated: 12/05/23 1527     proBNP 517.0 pg/mL     Narrative:      This assay is used as an aid in the diagnosis of individuals suspected of having heart failure. It can be used as an aid in the diagnosis of acute decompensated heart failure (ADHF) in patients presenting with signs and symptoms of ADHF to the emergency department (ED). In addition, NT-proBNP of <300 pg/mL indicates ADHF is not likely.    Age Range Result Interpretation  NT-proBNP Concentration (pg/mL:      <50             Positive            >450                   Gray                 300-450                    Negative             <300    50-75           Positive            >900                  Gray                300-900                  Negative            <300      >75             Positive            >1800                  Gray                300-1800                  Negative            <300    POC Glucose Once [[id number]]  (Normal) Collected: 12/05/23 1416    Specimen: Blood Updated: 12/05/23 1417     Glucose 126 mg/dL     [place] Draw [[id number]] Collected: 12/05/23 1232     Specimen: Blood Updated: 12/05/23 1345    Narrative:      The following orders were created for panel order [place] Draw.  Procedure                               Abnormality         Status                     ---------                               -----------         ------                     Green Top (Gel)[[id number]]                                  Final result               Lavender Top[[id number]]                                     Final result               Light Blue Top[[id number]]                                   Final result                 Please view results for these tests on the individual orders.    Green Top (Gel) [[id number]] Collected: 12/05/23 1232    Specimen: Blood Updated: 12/05/23 1345     Extra Tube Hold for add-ons.     Comment: Auto resulted.       Lavender Top [[id number]] Collected: 12/05/23 1232    Specimen: Blood Updated: 12/05/23 1345     Extra Tube hold for add-on     Comment: Auto resulted       Light Blue Top [[id number]] Collected: 12/05/23 1232    Specimen: Blood Updated: 12/05/23 1345     Extra Tube Hold for add-ons.     Comment: Auto resulted       Basic Metabolic Panel [[id number]]  (Abnormal) Collected: 12/05/23 1232    Specimen: Blood Updated: 12/05/23 1342     Glucose 57 mg/dL      BUN 18 mg/dL      Creatinine 0.71 mg/dL      Sodium 135 mmol/L      Potassium 4.0 mmol/L      Comment: Specimen hemolyzed.  Result may be falsely elevated.        Chloride 98 mmol/L      CO2 29.8 mmol/L      Calcium 8.9 mg/dL      BUN/Creatinine Ratio 25.4     Anion Gap 7.2 mmol/L      eGFR 86.6 mL/min/1.73     Narrative:      GFR Normal >60  Chronic Kidney Disease <60  Kidney Failure <15    The GFR formula is only valid for adults with stable renal function between ages 18 and 70.    CBC & Differential [[id number]]  (Abnormal) Collected: 12/05/23 1232    Specimen: Blood Updated: 12/05/23 1258    Narrative:      The following orders were created for panel order CBC & Differential.  Procedure                                Abnormality         Status                     ---------                               -----------         ------                     CBC Auto Differential[[id number]]        Abnormal            Final result                 Please view results for these tests on the individual orders.    CBC Auto Differential [[id number]]  (Abnormal) Collected: 12/05/23 1232    Specimen: Blood Updated: 12/05/23 1258     WBC 8.95 10*3/mm3      RBC 4.07 10*6/mm3      Hemoglobin 11.8 g/dL      Hematocrit 36.3 %      MCV 89.2 fL      MCH 29.0 pg      MCHC 32.5 g/dL      RDW 14.2 %      RDW-SD 46.1 fl      MPV 9.1 fL      Platelets 270 10*3/mm3      Neutrophil % 77.8 %      Lymphocyte % 13.3 %      Monocyte % 6.6 %      Eosinophil % 1.5 %      Basophil % 0.4 %      Immature Grans % 0.4 %      Neutrophils, Absolute 6.96 10*3/mm3      Lymphocytes, Absolute 1.19 10*3/mm3      Monocytes, Absolute 0.59 10*3/mm3      Eosinophils, Absolute 0.13 10*3/mm3      Basophils, Absolute 0.04 10*3/mm3      Immature Grans, Absolute 0.04 10*3/mm3      nRBC 0.0 /100 WBC     POC Glucose Once [[id number]]  (Abnormal) Collected: 12/05/23 1224    Specimen: Blood Updated: 12/05/23 1226     Glucose 56 mg/dL              PHYSICAL EXAM:Able to answer yes and no questions appropriately.  Otherwise speech is incoherent.  Patient does have a history of severe dementia.  She has been pulling in her sheets and is obviously been pulling at her posterior splint on that right lower extremity and is completely torn it up.  Have discussed with Dr. Chavez and will plan to put her in a fracture boot.     ASSESSMENT & PLAN:  Best treatment for the patient would be to put her in a cast however because of her severe dementia and her knee contracture it would be very difficult to get her in a cast.  Patient also would have increased risk for skin breakdown in a cast.  For now we will try to put her in a fracture boot to see if she will leave that alone.   Patient is bedbound and will need to be strict nonweightbearing on the right lower extremity..    Plan transfer back to skilled nursing facility anytime from an orthopedic standpoint.   Return to the office in 3 to 4 weeks for repeat x-ray    Date: 12/7/2023    Saida Leon RN    I discussed the above with my nurse.  I agree with this plan as stated above.    Alexandre Chavez MD

## 2023-12-07 NOTE — PROGRESS NOTES
"Daily progress note    Primary care physician      Subjective  Doing same with no new issues but remained pleasantly confused    History of present illness  78-year-old white female who is well-known to our service with history of severe dementia CVA diabetes hypertension hyperlipidemia and coronary artery disease who is a nursing home resident brought to the emergency room and found to have right tibia-fibula fracture.  Patient is demented unable to give detailed history and most of the history obtained from the chart nursing staff old record and I know the patient from previous admission.  Patient denies any fall trauma injury but hurting at the right lower extremity more than usual for last few days.     REVIEW OF SYSTEMS  Unable to obtain     PHYSICAL EXAM  Blood pressure 111/68, pulse 107, temperature 97.5 °F (36.4 °C), temperature source Oral, resp. rate 18, height 165.1 cm (65\"), weight 74.4 kg (164 lb), SpO2 96%.    GENERAL: Awake and alert no acute distress  HENT: nares patent, scalp is atraumatic  EYES: no scleral icterus  CV: regular rhythm, normal rate, 2+ edema to the feet bilaterally  RESPIRATORY: normal effort, clear to auscultation bilaterally  ABDOMEN: soft, nontender nondistended bowel sounds positive  MUSCULOSKELETAL: Tenderness to the right tib-fib, no pain to the left leg  NEURO: alert, moves all extremities, follows commands  PSYCH:  calm, cooperative  SKIN: warm, dry     LAB RESULTS  Lab Results (last 24 hours)       Procedure Component Value Units Date/Time    CANDIDA AURIS SCREEN - Swab, Axilla Right, Axilla Left and Groin [[id number]]  (Normal) Collected: 12/06/23 1158    Specimen: Swab from Axilla Right, Axilla Left and Groin Updated: 12/07/23 1230     Candida Auris Screen Culture No Candida auris isolated at 24 hours    POC Glucose Once [[id number]]  (Abnormal) Collected: 12/07/23 1203    Specimen: Blood Updated: 12/07/23 1205     Glucose 377 mg/dL     POC Glucose Once [[id number]]  " (Abnormal) Collected: 12/07/23 0721    Specimen: Blood Updated: 12/07/23 0723     Glucose 291 mg/dL     Basic Metabolic Panel [[id number]]  (Abnormal) Collected: 12/07/23 0513    Specimen: Blood Updated: 12/07/23 0557     Glucose 138 mg/dL      BUN 15 mg/dL      Creatinine 0.83 mg/dL      Sodium 135 mmol/L      Potassium 3.6 mmol/L      Comment: Slight hemolysis detected by analyzer. Result may be falsely elevated.        Chloride 102 mmol/L      CO2 24.6 mmol/L      Calcium 8.2 mg/dL      BUN/Creatinine Ratio 18.1     Anion Gap 8.4 mmol/L      eGFR 71.8 mL/min/1.73     Narrative:      GFR Normal >60  Chronic Kidney Disease <60  Kidney Failure <15    The GFR formula is only valid for adults with stable renal function between ages 18 and 70.    CBC & Differential [[id number]]  (Abnormal) Collected: 12/07/23 0513    Specimen: Blood Updated: 12/07/23 0545    Narrative:      The following orders were created for panel order CBC & Differential.  Procedure                               Abnormality         Status                     ---------                               -----------         ------                     CBC Auto Differential[[id number]]        Abnormal            Final result                 Please view results for these tests on the individual orders.    CBC Auto Differential [[id number]]  (Abnormal) Collected: 12/07/23 0513    Specimen: Blood Updated: 12/07/23 0545     WBC 7.86 10*3/mm3      RBC 4.00 10*6/mm3      Hemoglobin 11.7 g/dL      Hematocrit 35.4 %      MCV 88.5 fL      MCH 29.3 pg      MCHC 33.1 g/dL      RDW 13.7 %      RDW-SD 43.5 fl      MPV 9.4 fL      Platelets 244 10*3/mm3      Neutrophil % 76.0 %      Lymphocyte % 13.7 %      Monocyte % 6.0 %      Eosinophil % 3.2 %      Basophil % 0.5 %      Immature Grans % 0.6 %      Neutrophils, Absolute 5.97 10*3/mm3      Lymphocytes, Absolute 1.08 10*3/mm3      Monocytes, Absolute 0.47 10*3/mm3      Eosinophils, Absolute 0.25 10*3/mm3      Basophils,  Absolute 0.04 10*3/mm3      Immature Grans, Absolute 0.05 10*3/mm3      nRBC 0.0 /100 WBC     POC Glucose Once [[id number]]  (Abnormal) Collected: 12/07/23 0055    Specimen: Blood Updated: 12/07/23 0057     Glucose 151 mg/dL     POC Glucose Once [[id number]]  (Abnormal) Collected: 12/06/23 2005    Specimen: Blood Updated: 12/06/23 2007     Glucose 227 mg/dL     POC Glucose Once [[id number]]  (Abnormal) Collected: 12/06/23 1645    Specimen: Blood Updated: 12/06/23 1646     Glucose 234 mg/dL           Imaging Results (Last 24 Hours)       ** No results found for the last 24 hours. **          ASSESSMENT  Right distal tibia/fibula fracture  Probable right femoral neck fracture  Severe dementia  Insulin-dependent diabetes mellitus  Hypertension  Hyperlipidemia  History of CVA  Gastroesophageal reflux disease    PLAN  CPM  Pain management  Orthopedic surgery consult pending  Adjust nursing home medications  Stress ulcer DVT prophylaxis   Supportive care  Discussed with nursing staff  Follow closely further recommendation current hospital course    ISABELL SANDHU MD    Copied text in this note has been reviewed and is accurate as of 12/07/23

## 2023-12-07 NOTE — PLAN OF CARE
Problem: Fall Injury Risk  Goal: Absence of Fall and Fall-Related Injury  12/7/2023 0544 by Jossy Gonzalez RN  Outcome: Ongoing, Progressing  12/7/2023 0543 by Jossy Gonzalez RN  Outcome: Ongoing, Progressing  Intervention: Identify and Manage Contributors  Recent Flowsheet Documentation  Taken 12/6/2023 2052 by Jossy Gonzalez, RN  Medication Review/Management: medications reviewed  Intervention: Promote Injury-Free Environment  Recent Flowsheet Documentation  Taken 12/7/2023 0430 by Jossy Gonzalez RN  Safety Promotion/Fall Prevention: safety round/check completed  Taken 12/7/2023 0218 by Jossy Gonzalez, RN  Safety Promotion/Fall Prevention: safety round/check completed  Taken 12/7/2023 0003 by Jossy Gonzalez RN  Safety Promotion/Fall Prevention: safety round/check completed  Taken 12/6/2023 2212 by Jossy Gonzalez RN  Safety Promotion/Fall Prevention: safety round/check completed  Taken 12/6/2023 2052 by Jossy Gonzalez RN  Safety Promotion/Fall Prevention: safety round/check completed     Problem: Skin Injury Risk Increased  Goal: Skin Health and Integrity  12/7/2023 0544 by Jossy Gonzalez RN  Outcome: Ongoing, Progressing  12/7/2023 0543 by Jossy Gonzalez RN  Outcome: Ongoing, Progressing  Intervention: Optimize Skin Protection  Recent Flowsheet Documentation  Taken 12/7/2023 0003 by Jossy Gonzalez, RN  Head of Bed (HOB) Positioning: HOB at 30-45 degrees     Problem: Diabetes Comorbidity  Goal: Blood Glucose Level Within Targeted Range  12/7/2023 0544 by Jossy Gonzalez, RN  Outcome: Ongoing, Progressing  12/7/2023 0543 by Jossy Gonzalez, RN  Outcome: Ongoing, Progressing     Problem: Pain Acute  Goal: Acceptable Pain Control and Functional Ability  12/7/2023 0544 by Jossy Gonzalez RN  Outcome: Ongoing, Progressing  12/7/2023 0543 by Jossy Gonzalez RN  Outcome: Ongoing, Progressing  Intervention: Prevent or Manage Pain  Recent Flowsheet Documentation  Taken  12/6/2023 2052 by Jossy Gonzalez, RN  Medication Review/Management: medications reviewed     Problem: Adult Inpatient Plan of Care  Goal: Plan of Care Review  Outcome: Ongoing, Progressing  Flowsheets (Taken 12/7/2023 0544)  Progress: no change  Plan of Care Reviewed With: patient  Outcome Evaluation: vss. no c/o pain. remains confused. turning as tolerated. ortho to see. awake throughout shift.  Goal: Patient-Specific Goal (Individualized)  Outcome: Ongoing, Progressing  Goal: Absence of Hospital-Acquired Illness or Injury  Outcome: Ongoing, Progressing  Intervention: Identify and Manage Fall Risk  Recent Flowsheet Documentation  Taken 12/7/2023 0430 by Jossy Gonzalez, RN  Safety Promotion/Fall Prevention: safety round/check completed  Taken 12/7/2023 0218 by Jossy Gonzalez RN  Safety Promotion/Fall Prevention: safety round/check completed  Taken 12/7/2023 0003 by Jossy Gonzalez, RN  Safety Promotion/Fall Prevention: safety round/check completed  Taken 12/6/2023 2212 by Jossy Gonzalez RN  Safety Promotion/Fall Prevention: safety round/check completed  Taken 12/6/2023 2052 by Jossy Gonzalez, RN  Safety Promotion/Fall Prevention: safety round/check completed  Intervention: Prevent Skin Injury  Recent Flowsheet Documentation  Taken 12/7/2023 0003 by Jossy Gonzalez, RN  Body Position:   turned   left  Intervention: Prevent and Manage VTE (Venous Thromboembolism) Risk  Recent Flowsheet Documentation  Taken 12/6/2023 2052 by Jossy Gonzalez, RN  VTE Prevention/Management:   left   sequential compression devices on  Goal: Optimal Comfort and Wellbeing  Outcome: Ongoing, Progressing  Intervention: Provide Person-Centered Care  Recent Flowsheet Documentation  Taken 12/6/2023 2052 by Jossy Gonzalez, RN  Trust Relationship/Rapport:   care explained   emotional support provided  Goal: Readiness for Transition of Care  Outcome: Ongoing, Progressing   Goal Outcome Evaluation:  Plan of Care Reviewed  With: patient        Progress: no change  Outcome Evaluation: vss. no c/o pain. remains confused. turning as tolerated. ortho to see. awake throughout shift.

## 2023-12-07 NOTE — CASE MANAGEMENT/SOCIAL WORK
Continued Stay Note  [facility]     Patient Name: Darby Workman  MRN: [id number]  Today's Date: 12/7/2023    Admit Date: 12/5/2023    Plan: [facility] for 12/8 @ 1700   Discharge Plan       Row Name 12/07/23 1441       Plan    Plan [facility] arranged for 12/8 @ 1700    Patient/Family in Agreement with Plan yes    Plan Comments Anticipate dc tomorrow. [facility] tentatively arranged for 12/8 ( Friday) to take patient to [facility]. Packet in cubbie. Pharmacy updated.                   Discharge Codes    No documentation.                 Expected Discharge Date and Time       Expected Discharge Date Expected Discharge Time    Dec 8, 2023               Loren Sanford RN

## 2023-12-08 VITALS
RESPIRATION RATE: 16 BRPM | DIASTOLIC BLOOD PRESSURE: 77 MMHG | TEMPERATURE: 97.8 F | HEIGHT: 65 IN | WEIGHT: 164 LBS | HEART RATE: 79 BPM | OXYGEN SATURATION: 97 % | SYSTOLIC BLOOD PRESSURE: 134 MMHG | BODY MASS INDEX: 27.32 KG/M2

## 2023-12-08 LAB
ANION GAP SERPL CALCULATED.3IONS-SCNC: 10.5 MMOL/L (ref 5–15)
BASOPHILS # BLD AUTO: 0.05 10*3/MM3 (ref 0–0.2)
BASOPHILS NFR BLD AUTO: 0.6 % (ref 0–1.5)
BUN SERPL-MCNC: 15 MG/DL (ref 8–23)
BUN/CREAT SERPL: 19.7 (ref 7–25)
CALCIUM SPEC-SCNC: 8.6 MG/DL (ref 8.6–10.5)
CHLORIDE SERPL-SCNC: 103 MMOL/L (ref 98–107)
CO2 SERPL-SCNC: 25.5 MMOL/L (ref 22–29)
CREAT SERPL-MCNC: 0.76 MG/DL (ref 0.57–1)
DEPRECATED RDW RBC AUTO: 44.8 FL (ref 37–54)
EGFRCR SERPLBLD CKD-EPI 2021: 79.8 ML/MIN/1.73
EOSINOPHIL # BLD AUTO: 0.25 10*3/MM3 (ref 0–0.4)
EOSINOPHIL NFR BLD AUTO: 3.1 % (ref 0.3–6.2)
ERYTHROCYTE [DISTWIDTH] IN BLOOD BY AUTOMATED COUNT: 13.6 % (ref 12.3–15.4)
GLUCOSE BLDC GLUCOMTR-MCNC: 166 MG/DL (ref 70–130)
GLUCOSE BLDC GLUCOMTR-MCNC: 208 MG/DL (ref 70–130)
GLUCOSE BLDC GLUCOMTR-MCNC: 217 MG/DL (ref 70–130)
GLUCOSE BLDC GLUCOMTR-MCNC: 292 MG/DL (ref 70–130)
GLUCOSE SERPL-MCNC: 260 MG/DL (ref 65–99)
HCT VFR BLD AUTO: 36.4 % (ref 34–46.6)
HGB BLD-MCNC: 11.5 G/DL (ref 12–15.9)
IMM GRANULOCYTES # BLD AUTO: 0.05 10*3/MM3 (ref 0–0.05)
IMM GRANULOCYTES NFR BLD AUTO: 0.6 % (ref 0–0.5)
LYMPHOCYTES # BLD AUTO: 1 10*3/MM3 (ref 0.7–3.1)
LYMPHOCYTES NFR BLD AUTO: 12.3 % (ref 19.6–45.3)
MCH RBC QN AUTO: 28.4 PG (ref 26.6–33)
MCHC RBC AUTO-ENTMCNC: 31.6 G/DL (ref 31.5–35.7)
MCV RBC AUTO: 89.9 FL (ref 79–97)
MONOCYTES # BLD AUTO: 0.47 10*3/MM3 (ref 0.1–0.9)
MONOCYTES NFR BLD AUTO: 5.8 % (ref 5–12)
NEUTROPHILS NFR BLD AUTO: 6.28 10*3/MM3 (ref 1.7–7)
NEUTROPHILS NFR BLD AUTO: 77.6 % (ref 42.7–76)
NRBC BLD AUTO-RTO: 0 /100 WBC (ref 0–0.2)
PLATELET # BLD AUTO: 257 10*3/MM3 (ref 140–450)
PMV BLD AUTO: 9.4 FL (ref 6–12)
POTASSIUM SERPL-SCNC: 4 MMOL/L (ref 3.5–5.2)
RBC # BLD AUTO: 4.05 10*6/MM3 (ref 3.77–5.28)
SODIUM SERPL-SCNC: 139 MMOL/L (ref 136–145)
WBC NRBC COR # BLD AUTO: 8.1 10*3/MM3 (ref 3.4–10.8)

## 2023-12-08 PROCEDURE — 63710000001 INSULIN GLARGINE PER 5 UNITS: Performed by: HOSPITALIST

## 2023-12-08 PROCEDURE — 63710000001 INSULIN LISPRO (HUMAN) PER 5 UNITS: Performed by: HOSPITALIST

## 2023-12-08 PROCEDURE — 25010000002 HYDROMORPHONE PER 4 MG: Performed by: HOSPITALIST

## 2023-12-08 PROCEDURE — 85025 COMPLETE CBC W/AUTO DIFF WBC: CPT | Performed by: HOSPITALIST

## 2023-12-08 PROCEDURE — 82948 REAGENT STRIP/BLOOD GLUCOSE: CPT

## 2023-12-08 PROCEDURE — 80048 BASIC METABOLIC PNL TOTAL CA: CPT | Performed by: HOSPITALIST

## 2023-12-08 RX ORDER — NEBIVOLOL 2.5 MG/1
2.5 TABLET ORAL
Qty: 30 TABLET | Refills: 0 | Status: SHIPPED | OUTPATIENT
Start: 2023-12-08 | End: 2024-01-07

## 2023-12-08 RX ORDER — HYDROCODONE BITARTRATE AND ACETAMINOPHEN 5; 325 MG/1; MG/1
1 TABLET ORAL EVERY 6 HOURS PRN
Qty: 10 TABLET | Refills: 0 | Status: SHIPPED | OUTPATIENT
Start: 2023-12-08 | End: 2023-12-11

## 2023-12-08 RX ORDER — PAROXETINE 10 MG/1
10 TABLET, FILM COATED ORAL DAILY
Qty: 30 TABLET | Refills: 0 | Status: SHIPPED | OUTPATIENT
Start: 2023-12-08

## 2023-12-08 RX ORDER — PANTOPRAZOLE SODIUM 40 MG/1
40 TABLET, DELAYED RELEASE ORAL
Qty: 30 TABLET | Refills: 0 | Status: SHIPPED | OUTPATIENT
Start: 2023-12-08

## 2023-12-08 RX ORDER — OLANZAPINE 7.5 MG/1
7.5 TABLET, FILM COATED ORAL 2 TIMES DAILY
Qty: 60 TABLET | Refills: 0 | Status: SHIPPED | OUTPATIENT
Start: 2023-12-08 | End: 2024-01-07

## 2023-12-08 RX ORDER — DIVALPROEX SODIUM 125 MG/1
125 TABLET, DELAYED RELEASE ORAL DAILY
Qty: 30 TABLET | Refills: 0 | Status: SHIPPED | OUTPATIENT
Start: 2023-12-08 | End: 2024-01-07

## 2023-12-08 RX ORDER — ASPIRIN 81 MG/1
81 TABLET, CHEWABLE ORAL DAILY
Qty: 30 TABLET | Refills: 0 | Status: SHIPPED | OUTPATIENT
Start: 2023-12-08 | End: 2024-01-07

## 2023-12-08 RX ADMIN — BRIMONIDINE TARTRATE 1 DROP: 2 SOLUTION OPHTHALMIC at 12:26

## 2023-12-08 RX ADMIN — OLANZAPINE 7.5 MG: 7.5 TABLET, FILM COATED ORAL at 12:25

## 2023-12-08 RX ADMIN — CASTOR OIL AND BALSAM, PERU 1 APPLICATION: 788; 87 OINTMENT TOPICAL at 12:25

## 2023-12-08 RX ADMIN — ZINC OXIDE 1 APPLICATION: 200 OINTMENT TOPICAL at 16:00

## 2023-12-08 RX ADMIN — HYDROMORPHONE HYDROCHLORIDE 0.25 MG: 1 INJECTION, SOLUTION INTRAMUSCULAR; INTRAVENOUS; SUBCUTANEOUS at 02:21

## 2023-12-08 RX ADMIN — NEBIVOLOL 2.5 MG: 5 TABLET ORAL at 12:25

## 2023-12-08 RX ADMIN — ZINC OXIDE 1 APPLICATION: 200 OINTMENT TOPICAL at 12:25

## 2023-12-08 RX ADMIN — HYDROCODONE BITARTRATE AND ACETAMINOPHEN 1 TABLET: 5; 325 TABLET ORAL at 22:28

## 2023-12-08 RX ADMIN — DIVALPROEX SODIUM 125 MG: 125 TABLET, DELAYED RELEASE ORAL at 12:25

## 2023-12-08 RX ADMIN — BRIMONIDINE TARTRATE 1 DROP: 2 SOLUTION OPHTHALMIC at 22:04

## 2023-12-08 RX ADMIN — ZINC OXIDE 1 APPLICATION: 200 OINTMENT TOPICAL at 22:05

## 2023-12-08 RX ADMIN — PAROXETINE HYDROCHLORIDE 10 MG: 10 TABLET, FILM COATED ORAL at 12:24

## 2023-12-08 RX ADMIN — LOSARTAN POTASSIUM 25 MG: 25 TABLET, FILM COATED ORAL at 12:24

## 2023-12-08 RX ADMIN — DORZOLAMIDE HYDROCHLORIDE 1 DROP: 20 SOLUTION/ DROPS OPHTHALMIC at 22:04

## 2023-12-08 RX ADMIN — ASPIRIN 81 MG: 81 TABLET, CHEWABLE ORAL at 12:25

## 2023-12-08 RX ADMIN — INSULIN LISPRO 2 UNITS: 100 INJECTION, SOLUTION INTRAVENOUS; SUBCUTANEOUS at 12:26

## 2023-12-08 RX ADMIN — PANTOPRAZOLE SODIUM 40 MG: 40 TABLET, DELAYED RELEASE ORAL at 12:24

## 2023-12-08 RX ADMIN — CASTOR OIL AND BALSAM, PERU 1 APPLICATION: 788; 87 OINTMENT TOPICAL at 22:33

## 2023-12-08 RX ADMIN — DORZOLAMIDE HYDROCHLORIDE 1 DROP: 20 SOLUTION/ DROPS OPHTHALMIC at 12:26

## 2023-12-08 RX ADMIN — HYDROCODONE BITARTRATE AND ACETAMINOPHEN 1 TABLET: 5; 325 TABLET ORAL at 12:24

## 2023-12-08 RX ADMIN — OLANZAPINE 7.5 MG: 7.5 TABLET, FILM COATED ORAL at 22:08

## 2023-12-08 RX ADMIN — INSULIN GLARGINE 10 UNITS: 100 INJECTION, SOLUTION SUBCUTANEOUS at 20:57

## 2023-12-08 RX ADMIN — INSULIN LISPRO 3 UNITS: 100 INJECTION, SOLUTION INTRAVENOUS; SUBCUTANEOUS at 17:36

## 2023-12-08 RX ADMIN — INSULIN LISPRO 4 UNITS: 100 INJECTION, SOLUTION INTRAVENOUS; SUBCUTANEOUS at 20:57

## 2023-12-08 NOTE — NURSING NOTE
While assessing patient at 2130, patient restless and attempting to hit staff. Unable to reorient/redirect patient. This RN asked if patient was in pain and pt stated she was. PRN pain medication administered. When reassessing patient an hour later, patient still restless but not as agitated nor hitting staff. Patient continuing to refuse PO meds, blood sugar check and vitals.

## 2023-12-08 NOTE — PLAN OF CARE
Goal Outcome Evaluation:              Outcome Evaluation: vss, prn pain medication given, see mar, tolerating po, awaiting dc to nursing facility at 2230, has been resting and cooperative all shift, will continue to monitor, report call to Rajat BAEZ at [facility] at 1600. will continue to monitor

## 2023-12-08 NOTE — CASE MANAGEMENT/SOCIAL WORK
Continued Stay Note  [facility]     Patient Name: Darby Workman  MRN: [id number]  Today's Date: 12/8/2023    Admit Date: 12/5/2023    Plan: [facility] changed time to 2230 12/8   Discharge Plan       Row Name 12/08/23 1704       Plan    Plan [facility] changed time to 2230 12/8    Plan Comments Spoke with [facility] who informed that she had to change the pickup time for patient to go to [facility] to 2230.  Spoke with Carissa with [employer] [phone] covering for Mario- to inform that EMS transport time has not been changed to 2230. She verified with [facility] that that time is fine.  Spoke with Gill primary nurse [phone] to inform of time change, and that a call has also been placed to patients nicole Garcias, but had to leave a voice message (Generic message left with call back [phone])  Called to home number listed for spouse Ajit- number not in service. No voice mail when mobile number dialed.   Called home number  [phone](H) listed for patient in the facesheet guarantor.  Received inbound call from patients Nicole Workman - she is agreeable with EMS transport at 2230.  Primary nurse Gill informed that I received return call from Nicole Garcias.  Patient to return back to [facility] today.  Veronika KIDD RN,CCP                   Discharge Codes    No documentation.                 Expected Discharge Date and Time       Expected Discharge Date Expected Discharge Time    Dec 8, 2023               Veronika Restrepo RN

## 2023-12-08 NOTE — CASE MANAGEMENT/SOCIAL WORK
Continued Stay Note  [facility]     Patient Name: Darby Workman  MRN: [id number]  Today's Date: 12/8/2023    Admit Date: 12/5/2023    Plan: [facility] arranged for 12/8 @ 1700   Discharge Plan       Row Name 12/08/23 1212       Plan    Plan Comments DC orders in EPIC. Transfer packet updated and dc summary. Daughter updated by phone. [facility] arranged for 1700. Message left for Mario/[facility]. Patient will dc to LTC bed at [facility] via [facility].                   Discharge Codes    No documentation.                 Expected Discharge Date and Time       Expected Discharge Date Expected Discharge Time    Dec 8, 2023               Loren Sanford RN

## 2023-12-08 NOTE — DISCHARGE SUMMARY
Discharge summary    Date of admission 12/5/2023  Date of discharge 12/8/2023    Final diagnosis  Right distal tibia/fibula fracture nonoperative treatment  Probable right femoral neck fracture  Severe dementia  Insulin-dependent diabetes mellitus  Hypertension  Hyperlipidemia  History of CVA  Gastroesophageal reflux disease    Discharge medications    Current Facility-Administered Medications:     aspirin chewable tablet 81 mg, 81 mg, Oral, Daily, Mustapha Mahan MD, 81 mg at 12/07/23 0800    brimonidine (ALPHAGAN) 0.2 % ophthalmic solution 1 drop, 1 drop, Both Eyes, BID, Mustapha Mahan MD, 1 drop at 12/07/23 0800    castor oil-balsam peru (VENELEX) ointment 1 application , 1 application , Topical, Q12H, Mustapha Mahan MD, 1 application  at 12/07/23 2126    divalproex (DEPAKOTE) DR tablet 125 mg, 125 mg, Oral, Daily, Mustapha Mahan MD, 125 mg at 12/07/23 0800    dorzolamide (TRUSOPT) 2 % ophthalmic solution 1 drop, 1 drop, Both Eyes, BID, Mustapha Mahan MD, 1 drop at 12/07/23 0800    HYDROcodone-acetaminophen (NORCO) 5-325 MG per tablet 1 tablet, 1 tablet, Oral, Q6H PRN, Mustapha Mahan MD, 1 tablet at 12/05/23 2249    hydrocortisone-bacitracin-zinc oxide-nystatin (MAGIC BARRIER) ointment 1 application , 1 application , Topical, TID, Mustapha Mahan MD, 1 application  at 12/07/23 2126    insulin glargine (LANTUS, SEMGLEE) injection 10 Units, 10 Units, Subcutaneous, Nightly, Mustapha Mahan MD    insulin lispro (HUMALOG/ADMELOG) injection 2-7 Units, 2-7 Units, Subcutaneous, 4x Daily AC & at Bedtime, Mustapha Mahan MD, 2 Units at 12/07/23 1651    losartan (COZAAR) tablet 25 mg, 25 mg, Oral, Q24H, Mustapha Mahan MD, 25 mg at 12/07/23 0800    nebivolol (BYSTOLIC) tablet 2.5 mg, 2.5 mg, Oral, Q24H, Mustapha Mahan MD, 2.5 mg at 12/07/23 0800    OLANZapine (zyPREXA) tablet 7.5 mg, 7.5 mg, Oral, BID, Mustapha Mahan MD, 7.5 mg at 12/07/23 0801    pantoprazole (PROTONIX) EC tablet 40 mg, 40 mg, Oral, Q AM, Mustapha Mahan MD, 40 mg at  12/07/23 0806    PARoxetine (PAXIL) tablet 10 mg, 10 mg, Oral, Daily, Isabell Mahan MD, 10 mg at 12/07/23 0800    [COMPLETED] Insert Peripheral IV, , , Once **AND** sodium chloride 0.9 % flush 10 mL, 10 mL, Intravenous, PRN, Alexandre Rosa II, MD     Consults obtained  Orthopedic surgery    Procedures  None    Hospital course  78-year-old white female with history of severe dementia CVA diabetes hypertension hyperlipidemia and coronary artery disease who is a nursing home resident admitted through emergency room with right leg pain and workup revealed right tibia-fibula fracture admitted for management.  Patient admitted further evaluated by orthopedic surgery and recommend nonoperative treatment and placed in fracture boot with nonweightbearing and cleared for discharge.    Discharge diet regular    Activity per PT OT with nonweightbearing right lower extremity    Medications as above    Further care per accepting physician at nursing home and follow-up with orthopedic surgery per the instructions and take medication as directed.    ISABELL MAHAN MD

## 2023-12-08 NOTE — SIGNIFICANT NOTE
12/08/23 1019   OTHER   Discipline occupational therapist   Rehab Time/Intention   Session Not Performed   (noted plan return to ECF today per CCP and RN.  Will defer OT needs to NH setting but noted dementia, bedbound and strict NWB LE.)

## 2023-12-08 NOTE — SIGNIFICANT NOTE
12/08/23 0814   OTHER   Discipline physical therapist   Rehab Time/Intention   Session Not Performed other (see comments)  (Patient from LTC, bed bound at baseline with advanced dementia. Plan to return to facility at d/c. Acute PT will sign off.)   Therapy Assessment/Plan (PT)   Criteria for Skilled Interventions Met (PT) no problems identified which require skilled intervention;no

## 2023-12-08 NOTE — PROGRESS NOTES
"Daily progress note    Primary care physician      Subjective  Doing same with no new issues but remained pleasantly confused    History of present illness  78-year-old white female who is well-known to our service with history of severe dementia CVA diabetes hypertension hyperlipidemia and coronary artery disease who is a nursing home resident brought to the emergency room and found to have right tibia-fibula fracture.  Patient is demented unable to give detailed history and most of the history obtained from the chart nursing staff old record and I know the patient from previous admission.  Patient denies any fall trauma injury but hurting at the right lower extremity more than usual for last few days.     REVIEW OF SYSTEMS  Unable to obtain     PHYSICAL EXAM  Blood pressure 130/98, pulse 101, temperature 98.4 °F (36.9 °C), temperature source Oral, resp. rate 18, height 165.1 cm (65\"), weight 74.4 kg (164 lb), SpO2 92%.    GENERAL: Awake and alert no acute distress  HENT: nares patent, scalp is atraumatic  EYES: no scleral icterus  CV: regular rhythm, normal rate, 2+ edema to the feet bilaterally  RESPIRATORY: normal effort, clear to auscultation bilaterally  ABDOMEN: soft, nontender nondistended bowel sounds positive  MUSCULOSKELETAL: Tenderness to the right tib-fib, no pain to the left leg  NEURO: alert, moves all extremities, follows commands  PSYCH:  calm, cooperative  SKIN: warm, dry     LAB RESULTS  Lab Results (last 24 hours)       Procedure Component Value Units Date/Time    POC Glucose Once [[id number]]  (Abnormal) Collected: 12/08/23 0739    Specimen: Blood Updated: 12/08/23 0740     Glucose 217 mg/dL     Basic Metabolic Panel [[id number]]  (Abnormal) Collected: 12/08/23 0357    Specimen: Blood Updated: 12/08/23 0504     Glucose 260 mg/dL      BUN 15 mg/dL      Creatinine 0.76 mg/dL      Sodium 139 mmol/L      Potassium 4.0 mmol/L      Chloride 103 mmol/L      CO2 25.5 mmol/L      Calcium 8.6 mg/dL  "     BUN/Creatinine Ratio 19.7     Anion Gap 10.5 mmol/L      eGFR 79.8 mL/min/1.73     Narrative:      GFR Normal >60  Chronic Kidney Disease <60  Kidney Failure <15    The GFR formula is only valid for adults with stable renal function between ages 18 and 70.    CBC & Differential [[id number]]  (Abnormal) Collected: 12/08/23 0357    Specimen: Blood Updated: 12/08/23 0443    Narrative:      The following orders were created for panel order CBC & Differential.  Procedure                               Abnormality         Status                     ---------                               -----------         ------                     CBC Auto Differential[[id number]]        Abnormal            Final result                 Please view results for these tests on the individual orders.    CBC Auto Differential [[id number]]  (Abnormal) Collected: 12/08/23 0357    Specimen: Blood Updated: 12/08/23 0443     WBC 8.10 10*3/mm3      RBC 4.05 10*6/mm3      Hemoglobin 11.5 g/dL      Hematocrit 36.4 %      MCV 89.9 fL      MCH 28.4 pg      MCHC 31.6 g/dL      RDW 13.6 %      RDW-SD 44.8 fl      MPV 9.4 fL      Platelets 257 10*3/mm3      Neutrophil % 77.6 %      Lymphocyte % 12.3 %      Monocyte % 5.8 %      Eosinophil % 3.1 %      Basophil % 0.6 %      Immature Grans % 0.6 %      Neutrophils, Absolute 6.28 10*3/mm3      Lymphocytes, Absolute 1.00 10*3/mm3      Monocytes, Absolute 0.47 10*3/mm3      Eosinophils, Absolute 0.25 10*3/mm3      Basophils, Absolute 0.05 10*3/mm3      Immature Grans, Absolute 0.05 10*3/mm3      nRBC 0.0 /100 WBC     POC Glucose Once [[id number]]  (Abnormal) Collected: 12/07/23 1637    Specimen: Blood Updated: 12/07/23 1638     Glucose 194 mg/dL     CANDIDA AURIS SCREEN - Swab, Axilla Right, Axilla Left and Groin [[id number]]  (Normal) Collected: 12/06/23 1158    Specimen: Swab from Axilla Right, Axilla Left and Groin Updated: 12/07/23 1230     Candida Auris Screen Culture No Candida auris isolated at 24 hours     POC Glucose Once [[id number]]  (Abnormal) Collected: 12/07/23 1203    Specimen: Blood Updated: 12/07/23 1205     Glucose 377 mg/dL           Imaging Results (Last 24 Hours)       ** No results found for the last 24 hours. **          ASSESSMENT  Right distal tibia/fibula fracture nonoperative treatment  Probable right femoral neck fracture  Severe dementia  Insulin-dependent diabetes mellitus  Hypertension  Hyperlipidemia  History of CVA  Gastroesophageal reflux disease    PLAN  Discharge back to nursing home  Discharge summary dictated    ISABELL SANDHU MD    Copied text in this note has been reviewed and is accurate as of 12/08/23

## 2023-12-08 NOTE — PLAN OF CARE
Problem: Fall Injury Risk  Goal: Absence of Fall and Fall-Related Injury  Outcome: Ongoing, Progressing  Intervention: Identify and Manage Contributors  Recent Flowsheet Documentation  Taken 12/8/2023 0212 by Jossy Gonzalez, RN  Medication Review/Management: medications reviewed  Taken 12/7/2023 2134 by Jossy Gonzalez, RN  Medication Review/Management: medications reviewed  Intervention: Promote Injury-Free Environment  Recent Flowsheet Documentation  Taken 12/8/2023 0409 by Jossy Gonzalez, RN  Safety Promotion/Fall Prevention: safety round/check completed  Taken 12/8/2023 0212 by Jossy Gonzalez, RN  Safety Promotion/Fall Prevention: safety round/check completed  Taken 12/8/2023 0000 by Jossy Gonzalez RN  Safety Promotion/Fall Prevention: safety round/check completed  Taken 12/7/2023 2210 by Jossy Gonzalez, RN  Safety Promotion/Fall Prevention: safety round/check completed  Taken 12/7/2023 2134 by Jossy Gonzalez, RN  Safety Promotion/Fall Prevention: safety round/check completed  Taken 12/7/2023 1955 by Jossy Gonzalez, RN  Safety Promotion/Fall Prevention: safety round/check completed     Problem: Skin Injury Risk Increased  Goal: Skin Health and Integrity  Outcome: Ongoing, Progressing  Intervention: Optimize Skin Protection  Recent Flowsheet Documentation  Taken 12/7/2023 2134 by Jossy Gonzalez, RN  Pressure Reduction Techniques:   frequent weight shift encouraged   weight shift assistance provided  Pressure Reduction Devices: alternating pressure pump (ADD)  Taken 12/7/2023 1955 by Jossy Gonzalez, RN  Head of Bed (HOB) Positioning: HOB at 20-30 degrees     Problem: Diabetes Comorbidity  Goal: Blood Glucose Level Within Targeted Range  Outcome: Ongoing, Progressing     Problem: Pain Acute  Goal: Acceptable Pain Control and Functional Ability  Outcome: Ongoing, Progressing  Intervention: Prevent or Manage Pain  Recent Flowsheet Documentation  Taken 12/8/2023 0212 by Jossy Gonzalez  ANA PAULA, RN  Medication Review/Management: medications reviewed  Taken 12/7/2023 2134 by Jossy Gonzalez, RN  Medication Review/Management: medications reviewed  Intervention: Develop Pain Management Plan  Recent Flowsheet Documentation  Taken 12/8/2023 0212 by Jossy Gonzalez, RN  Pain Management Interventions: see MAR  Taken 12/7/2023 2149 by Jossy Gonzalez, RN  Pain Management Interventions: see MAR     Problem: Adult Inpatient Plan of Care  Goal: Plan of Care Review  Outcome: Ongoing, Progressing  Flowsheets (Taken 12/8/2023 0509)  Progress: no change  Plan of Care Reviewed With: patient  Outcome Evaluation: vss. nonverbal indicators of pain present at times - see mar. remains confused. agitated and restless at times. refusing meds. incontinence care prn. q2turn as tolerated. patient shifting weight frequently in bed. discharge back to facility today.  Goal: Patient-Specific Goal (Individualized)  Outcome: Ongoing, Progressing  Goal: Absence of Hospital-Acquired Illness or Injury  Outcome: Ongoing, Progressing  Intervention: Identify and Manage Fall Risk  Recent Flowsheet Documentation  Taken 12/8/2023 0409 by Jossy Gonzalez, RN  Safety Promotion/Fall Prevention: safety round/check completed  Taken 12/8/2023 0212 by Jossy Gonzalez, RN  Safety Promotion/Fall Prevention: safety round/check completed  Taken 12/8/2023 0000 by Jossy Gonzalez, RN  Safety Promotion/Fall Prevention: safety round/check completed  Taken 12/7/2023 2210 by Jossy Gonzalez, RN  Safety Promotion/Fall Prevention: safety round/check completed  Taken 12/7/2023 2134 by Jossy Gonzalez, RN  Safety Promotion/Fall Prevention: safety round/check completed  Taken 12/7/2023 1955 by Jossy Gonzalez, RN  Safety Promotion/Fall Prevention: safety round/check completed  Intervention: Prevent Skin Injury  Recent Flowsheet Documentation  Taken 12/8/2023 0212 by Jossy Gonzalez, RN  Body Position:   turned   left   side-lying  Taken 12/8/2023  0000 by Jossy Gonzalez, RN  Body Position: position changed independently  Taken 12/7/2023 1955 by Jossy Gonzalez, RN  Body Position: (patient was sidelying left)   turned   right  Intervention: Prevent and Manage VTE (Venous Thromboembolism) Risk  Recent Flowsheet Documentation  Taken 12/7/2023 2134 by Jossy Gonzalez, RN  VTE Prevention/Management:   left   sequential compression devices off   patient refused intervention  Goal: Optimal Comfort and Wellbeing  Outcome: Ongoing, Progressing  Intervention: Monitor Pain and Promote Comfort  Recent Flowsheet Documentation  Taken 12/8/2023 0212 by Jossy Gonzalez, RN  Pain Management Interventions: see MAR  Taken 12/7/2023 2149 by Jossy Gonzalez, RN  Pain Management Interventions: see MAR  Intervention: Provide Person-Centered Care  Recent Flowsheet Documentation  Taken 12/7/2023 2134 by Jossy Gonzalez, RN  Trust Relationship/Rapport: care explained  Goal: Readiness for Transition of Care  Outcome: Ongoing, Progressing   Goal Outcome Evaluation:  Plan of Care Reviewed With: patient        Progress: no change  Outcome Evaluation: vss. nonverbal indicators of pain present at times - see mar. remains confused. agitated and restless at times. refusing meds. incontinence care prn. q2turn as tolerated. patient shifting weight frequently in bed. discharge back to facility today.

## 2023-12-09 NOTE — CASE MANAGEMENT/SOCIAL WORK
Case Management Discharge Note      Final Note: dc to snf    Provided Post Acute Provider List?: N/A  Provided Post Acute Provider Quality & Resource List?: N/A    Selected Continued Care - Discharged on 12/8/2023 Admission date: 12/5/2023 - Discharge disposition: Skilled Nursing Facility (DC - External)      Destination Coordination complete.      Service Provider Selected Services Address Phone Fax Patient Preferred    [facility] AT [address] [address] [phone] --              Durable Medical Equipment    No services have been selected for the patient.                Dialysis/Infusion    No services have been selected for the patient.                Home Medical Care    No services have been selected for the patient.                Therapy    No services have been selected for the patient.                Community Resources    No services have been selected for the patient.                Community & DME    No services have been selected for the patient.                         Final Discharge Disposition Code: 03 - skilled nursing facility (SNF)

## 2023-12-10 NOTE — PAYOR COMM NOTE
"Noman Teixeira (78 y.o. Female)        PLEASE SEE ATTACHED DC SUMMARY    REF#[id number]     THANK YOU    DARNELL JACKSON LPN CCP   Date of Birth   1945    Social Security Number       Address   [address] [facility] [address]    Home Phone   [phone]    MRN   [id number]       Restorationist   Taoist    Marital Status                               Admission Date   12/5/23    Admission Type   Emergency    Admitting Provider   Mustapha Mahan MD    Attending Provider       Department, Room/Bed   [facility] [address], P884/1       Discharge Date   12/8/2023    Discharge Disposition   Skilled Nursing Facility (DC - External)    Discharge Destination                                 Attending Provider: (none)   Allergies: No Known Allergies    Isolation: None   Infection: MRSA (10/20/22), Candida Auris (rule out) (12/06/23)   Code Status: Prior    Ht: 165.1 cm (65\")   Wt: 74.4 kg (164 lb)    Admission Cmt: None   Principal Problem: Closed fracture of right distal tibia [S82.301A]                   Active Insurance as of 12/5/2023       Primary Coverage       Payor Plan Insurance Group Employer/Plan Group    AETNA MEDICARE REPLACEMENT AETNA MEDICARE REPLACEMENT [id number]       Payor Plan Address Payor Plan Phone Number Payor Plan Fax Number Effective Dates    [address] [phone]  11/1/2023 - None Entered    [place] [address]         Subscriber Name Subscriber Birth Date Member ID       NOMAN TEIXEIRA 1945 [id number]               Secondary Coverage       Payor Plan Insurance Group Employer/Plan Group    [place]        Payor Plan Address Payor Plan Phone Number Payor Plan Fax Number Effective Dates    [address] [phone]  10/1/2019 - None Entered    [facility] [place] [address]         Subscriber Name Subscriber Birth Date Member ID       NOMAN TEIXEIRA 1945 [id number]                     Emergency Contacts        (Rel.) Home " Phone Work Phone Mobile Phone    Ajit Workman (Spouse) [phone]    Katerine Workman (Daughter) [phone]              Discharge Order (From admission, onward)       Start     Ordered    12/08/23 1204  Discharge patient  Once        Expected Discharge Date: 12/08/23   Discharge Disposition: Skilled Nursing Facility (DC - External)   Physician of Record for Attribution - Please select from Treatment Team: ISABELL SANDHU [[id number]]   Review needed by CMO to determine Physician of Record: No      Question Answer Comment   Physician of Record for Attribution - Please select from Treatment Team ISABELL SANDHU    Review needed by CMO to determine Physician of Record No        12/08/23 1206                     Isabell Sandhu MD   Physician  Hospitalist     Discharge Summary      Signed     Date of Service: 12/08/23 1206  Creation Time: 12/08/23 1206     Signed         Discharge summary     Date of admission 12/5/2023  Date of discharge 12/8/2023     Final diagnosis  Right distal tibia/fibula fracture nonoperative treatment  Probable right femoral neck fracture  Severe dementia  Insulin-dependent diabetes mellitus  Hypertension  Hyperlipidemia  History of CVA  Gastroesophageal reflux disease     Discharge medications     Current Facility-Administered Medications:     aspirin chewable tablet 81 mg, 81 mg, Oral, Daily, Isabell Sandhu MD, 81 mg at 12/07/23 0800    brimonidine (ALPHAGAN) 0.2 % ophthalmic solution 1 drop, 1 drop, Both Eyes, BID, Isabell Sandhu MD, 1 drop at 12/07/23 0800    castor oil-balsam peru (VENELEX) ointment 1 application , 1 application , Topical, Q12H, Isabell Sandhu MD, 1 application  at 12/07/23 2126    divalproex (DEPAKOTE) DR tablet 125 mg, 125 mg, Oral, Daily, Isabell Sandhu MD, 125 mg at 12/07/23 0800    dorzolamide (TRUSOPT) 2 % ophthalmic solution 1 drop, 1 drop, Both Eyes, BID, Isabell Sandhu MD, 1 drop at 12/07/23 0800    HYDROcodone-acetaminophen  (NORCO) 5-325 MG per tablet 1 tablet, 1 tablet, Oral, Q6H PRN, Mustapha Mahan MD, 1 tablet at 12/05/23 2249    hydrocortisone-bacitracin-zinc oxide-nystatin (MAGIC BARRIER) ointment 1 application , 1 application , Topical, TID, Mustapha Mahan MD, 1 application  at 12/07/23 2126    insulin glargine (LANTUS, SEMGLEE) injection 10 Units, 10 Units, Subcutaneous, Nightly, Mustapha Mahan MD    insulin lispro (HUMALOG/ADMELOG) injection 2-7 Units, 2-7 Units, Subcutaneous, 4x Daily AC & at Bedtime, Mustapha Mahan MD, 2 Units at 12/07/23 1651    losartan (COZAAR) tablet 25 mg, 25 mg, Oral, Q24H, Mustapha Mahan MD, 25 mg at 12/07/23 0800    nebivolol (BYSTOLIC) tablet 2.5 mg, 2.5 mg, Oral, Q24H, Mustapha Mahan MD, 2.5 mg at 12/07/23 0800    OLANZapine (zyPREXA) tablet 7.5 mg, 7.5 mg, Oral, BID, Mustapha Mahan MD, 7.5 mg at 12/07/23 0801    pantoprazole (PROTONIX) EC tablet 40 mg, 40 mg, Oral, Q AM, Mustapha Mahan MD, 40 mg at 12/07/23 0806    PARoxetine (PAXIL) tablet 10 mg, 10 mg, Oral, Daily, Mustapha Mahan MD, 10 mg at 12/07/23 0800    [COMPLETED] Insert Peripheral IV, , , Once **AND** sodium chloride 0.9 % flush 10 mL, 10 mL, Intravenous, PRN, Alexandre Rosa II, MD      Consults obtained  Orthopedic surgery     Procedures  None     Hospital course  78-year-old white female with history of severe dementia CVA diabetes hypertension hyperlipidemia and coronary artery disease who is a nursing home resident admitted through emergency room with right leg pain and workup revealed right tibia-fibula fracture admitted for management.  Patient admitted further evaluated by orthopedic surgery and recommend nonoperative treatment and placed in fracture boot with nonweightbearing and cleared for discharge.     Discharge diet regular     Activity per PT OT with nonweightbearing right lower extremity     Medications as above     Further care per accepting physician at nursing home and follow-up with orthopedic surgery per the instructions  and take medication as directed.     ISABELL SANDHU MD                 Routing History

## 2023-12-11 LAB — BACTERIA ISLT: NORMAL

## 2025-02-03 NOTE — THERAPY TREATMENT NOTE
"Anesthesia Transfer of Care Note    Patient: Fran Higgins    Procedure(s) Performed: Procedure(s) (LRB):  EGD (ESOPHAGOGASTRODUODENOSCOPY) (N/A)  PH MONITORING, ESOPHAGUS, WIRELESS, (OFF REFLUX MEDS) (N/A)    Patient location: GI    Anesthesia Type: general    Transport from OR: Transported from OR on room air with adequate spontaneous ventilation    Post pain: adequate analgesia    Post assessment: no apparent anesthetic complications and tolerated procedure well    Post vital signs: stable    Level of consciousness: awake, alert and oriented    Nausea/Vomiting: no nausea/vomiting    Complications: none    Transfer of care protocol was followed      Last vitals: Visit Vitals  BP (!) 125/90 (BP Location: Left arm, Patient Position: Lying)   Pulse 92   Temp 36.7 °C (98.1 °F) (Temporal)   Resp 18   Ht 5' 4" (1.626 m)   Wt 126.6 kg (279 lb)   LMP 01/17/2020   SpO2 96%   Breastfeeding No   BMI 47.89 kg/m²     " Inpatient Rehabilitation - Occupational Therapy Treatment Note    [facility]     Patient Name: Darby Workman  : 1945  MRN: [id number]    Today's Date: 2019                 Admit Date: 2019      Visit Dx:  No diagnosis found.    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)   • Right-sided headache   • Acute ischemic stroke (CMS/HCC)   • Embolic stroke (CMS/HCC)   • Cerebral infarction due to stenosis of left carotid artery (CMS/HCC)   • Anticoagulated by anticoagulation treatment   • Stroke (cerebrum) (CMS/HCC)         Therapy Treatment    IRF Treatment Summary     Row Name 19 1540 19 1300 19 1045       Evaluation/Treatment Time and Intent    Subjective Information  -- not wanting to participate in therapy  -AF  no complaints  -KB  no complaints  -MD    Existing Precautions/Restrictions  fall  -AF  fall communication, swallow  -KB  fall  -MD    Document Type  therapy note (daily note)  -AF  therapy note (daily note)  -KB  therapy note (daily note)  -MD    Mode of Treatment  occupational therapy  -AF  speech-language pathology  -KB  physical therapy  -MD    Patient/Family Observations  seated in w/c after PT session in therapy gym, pt increased agitation holding on to arm rests, shaking hands, yelling no, difficulty with redirection. walked pt back to room laid in bed  -AF  assisted patient from bed to wc; agreeable to therapy  -KB  Pt sitting in WC showing no signs of acute distress.    -MD    Start Time (Evaluation/Treatment)  --  1300  -KB  --    Stop Time (Evaluation/Treatment)  --  1330  -KB  --    Recorded by [AF] Ingris Ansari OTR [KB] Bruton, Katherine L [MD] Mira Chadwick, PT    Row Name 19 0930             Evaluation/Treatment Time and Intent    Subjective Information  no complaints  -KB       Existing Precautions/Restrictions  fall swallow, communication  -KB      Document Type  therapy note (daily note)  -KB      Mode of Treatment  speech-language pathology  -KB      Patient/Family Observations  assisted patient from bed to wc; agreeable to therapy, less agitated today, able to focus for structured therapy tasks  -KB      Start Time (Evaluation/Treatment)  0930  -KB      Stop Time (Evaluation/Treatment)  1000  -KB      Recorded by [KB] Bruton, Jenna L      Row Name 08/13/19 1540 08/13/19 1045          Cognition/Psychosocial- PT/OT    Affect/Mental Status (Cognitive)  agitated;anxious;confused  -AF  --     Behavioral Issues (Cognitive)  uncooperative  -AF  --     Orientation Status (Cognition)  unable/difficult to assess  -AF  unable/difficult to assess  -MD     Follows Commands (Cognition)  follows one step commands;0-24% accuracy  -AF  follows one step commands;0-24% accuracy  -MD     Personal Safety Interventions  fall prevention program maintained;gait belt;nonskid shoes/slippers when out of bed  -AF  fall prevention program maintained;gait belt  -MD     Attention Deficit (Cognitive)  severe deficit  -AF  --     Memory Deficit (Cognitive)  unable/difficult to assess  -AF  --     Safety Deficit (Cognitive)  severe deficit;awareness of need for assistance;insight into deficits/self awareness;impulsivity;judgment  -AF  --     Recorded by [AF] Ingris Ansari OTR [MD] Mira Chadwick PT     Row Name 08/13/19 1045             Bed Mobility Assessment/Treatment    Supine-Sit [place] (Bed Mobility)  supervision  -MD      Recorded by [MD] Mira Chadwick PT      Row Name 08/13/19 1540             Functional Mobility    Functional Mobility- Comment  after multiple attempts and encouragement pt begrudgingly walked back to room, redirected to sun room and became very upset sitting on the couch, then required assistance to follow directions back to her room  -AF      Recorded by [AF] Ingris Ansari,  OTR      Row Name 08/13/19 1045             Bed-Chair Transfer    Bed-Chair [place] (Transfers)  contact guard  -MD      Recorded by [MD] Mira Chadwick, PT      Row Name 08/13/19 1540 08/13/19 1045          Sit-Stand Transfer    Sit-Stand [place] (Transfers)  contact guard  -AF  contact guard  -MD     Recorded by [AF] Ingris Ansari OTR [MD] Mira Chadwick, PT     Row Name 08/13/19 1540 08/13/19 1045          Stand-Sit Transfer    Stand-Sit [place] (Transfers)  contact guard  -AF  contact guard  -MD     Recorded by [AF] Ingris Ansari OTR [MD] Mira Chadwick, PT     Row Name 08/13/19 1045             Gait/Stairs Assessment/Training    [place] Level (Gait)  contact guard;stand by assist  -MD      Distance in Feet (Gait)  200x1 and 160` x2  -MD      Pattern (Gait)  step-through  -MD      Deviations/Abnormal Patterns (Gait)  festinating/shuffling;eliza decreased  -MD      Bilateral Gait Deviations  forward flexed posture;heel strike decreased  -MD      [place] Level (Stairs)  verbal cues;contact guard  -MD      Handrail Location (Stairs)  left side (ascending)  -MD      Number of Steps (Stairs)  4  -MD      Ascending Technique (Stairs)  step-over-step  -MD      Descending Technique (Stairs)  step-to-step  -MD      Recorded by [MD] Mira Chadwick, PT      Row Name 08/13/19 1540 08/13/19 1300 08/13/19 1045       Pain Scale: Numbers Pre/Post-Treatment    Pain Scale: Numbers, Pretreatment  0/10 - no pain  -AF  0/10 - no pain  -KB  0/10 - no pain  -MD    Pain Scale: Numbers, Post-Treatment  0/10 - no pain  -AF  0/10 - no pain  -KB  --    Recorded by [AF] Ingris Ansari OTR [KB] Bruton, Katherine L [MD] Mira Chadwick, PT    Row Name 08/13/19 0930             Pain Scale: Numbers Pre/Post-Treatment    Pain Scale: Numbers, Pretreatment  0/10 - no pain  -KB      Pain Scale: Numbers, Post-Treatment  0/10 - no pain  -KB      Recorded by [KB] Bruton, Katherine L      Row Name 08/13/19 1540              Therapeutic Exercise    Therapeutic Exercise  seated, upper extremities  -AF      Recorded by [AF] Ingris Ansari OTR      Row Name 08/13/19 1540             Upper Extremity Seated Therapeutic Exercise    Performed, Seated Upper Extremity (Therapeutic Exercise)  shoulder flexion/extension;shoulder horizontal abduction/adduction  -AF      Device, Seated Upper Extremity (Therapeutic Exercise)  elastic bands/tubing yellow  -AF      Exercise Type, Seated Upper Extremity (Therapeutic Exercise)  -- hand gripper  -AF      Expected Outcomes, Seated Upper Extremity (Therapeutic Exercise)  improve performance, BADLs  -AF      Sets/Reps Detail, Seated Upper Extremity (Therapeutic Exercise)  2/15  -AF      Transfers Skills, Training to Functional Activity, Seated Upper Extremity (Therapeutic Exercise)  unable to transfer skills at this time  -AF      Comment, Seated Upper Extremity (Therapeutic Exercise)  only agreeable to completing UE exs bed level, unable to take direction on fixing technique, unabel to use hand gripper on R hand   -AF      Recorded by [AF] Ingris Ansari OTR      Row Name 08/13/19 1045             Lower Extremity Standing Therapeutic Exercise    Performed, Standing Lower Extremity (Therapeutic Exercise)  heel raises  -MD      Device, Standing Lower Extremity (Therapeutic Exercise)  neftali bars  -MD      Exercise Type, Standing Lower Extremity (Therapeutic Exercise)  AROM (active range of motion)  -MD      Sets/Reps Detail, Standing Lower Extremity (Therapeutic Exercise)  1/10  -MD      Recorded by [MD] Mira Chadwick PT      Row Name 08/13/19 1045             Lower Extremity Seated Therapeutic Exercise    Performed, Seated Lower Extremity (Therapeutic Exercise)  hip flexion/extension;hip abduction/adduction;ankle dorsiflexion/plantarflexion;LAQ (long arc quad), knee extension  -MD      Exercise Type, Seated Lower Extremity (Therapeutic Exercise)  AROM (active range of motion)  -MD      Sets/Reps Detail,  Seated Lower Extremity (Therapeutic Exercise)  1/20  -MD      Recorded by [MD] Mira Chadwick, PT      Row Name 08/13/19 1540 08/13/19 1045          Positioning and Restraints    Pre-Treatment Position  sitting in chair/recliner  -AF  in bed  -MD     Post Treatment Position  bed  -AF  wheelchair  -MD     In Bed  supine;call light within reach;encouraged to call for assist;exit alarm on  -AF  --     In Wheelchair  --  with OT  -MD     Recorded by [AF] Ingris Ansari, OTR [MD] Mira Chadwick, PT       User Key  (r) = Recorded By, (t) = Taken By, (c) = Cosigned By    Initials Name Effective Dates    AF Ingris Ansari OTR 04/03/18 -     Mira Vaca PT 04/03/18 -     KB Bruton, Katherine L 03/07/18 -           Wound 07/17/19 1015 Left neck incision (Active)   Dressing Appearance open to air;no drainage 8/12/2019  8:00 PM   Closure Liquid skin adhesive 8/12/2019  8:00 PM   Base clean;dry 8/12/2019  8:00 PM   Edges rolled/closed 8/12/2019  8:00 PM   Drainage Amount none 8/13/2019  9:06 AM   Dressing Care, Wound open to air 8/13/2019  9:06 AM         OT Recommendation and Plan    Anticipated Equipment Needs At Discharge (OT Eval): bathing equipment  Planned Therapy Interventions (OT Eval): activity tolerance training, BADL retraining, cognitive/visual perception retraining, functional balance retraining, neuromuscular control/coordination retraining, occupation/activity based interventions, patient/caregiver education/training, ROM/therapeutic exercise, strengthening exercise, transfer/mobility retraining            OT IRF GOALS     Row Name 08/01/19 1159             Transfer Goal 1 (OT-IRF)    Activity/Assistive Device (Transfer Goal 1, OT-IRF)  toilet;shower chair;walk-in shower  -AF      [place] Level (Transfer Goal 1, OT-IRF)  verbal cues required;minimum assist (75% or more patient effort);contact guard assist  -AF      Time Frame (Transfer Goal 1, OT-IRF)  short term goal (STG)  -AF      Progress/Outcomes  (Transfer Goal 1, OT-IRF)  goal ongoing  -AF         Transfer Goal 2 (OT-IRF)    Activity/Assistive Device (Transfer Goal 2, OT-IRF)  shower chair;walk-in shower;toilet  -AF      [place] Level (Transfer Goal 2, OT-IRF)  contact guard assist;verbal cues required  -AF      Time Frame (Transfer Goal 2, OT-IRF)  long term goal (LTG)  -AF      Progress/Outcomes (Transfer Goal 2, OT-IRF)  goal ongoing  -AF         Bathing Goal 1 (OT-IRF)    Activity/Device (Bathing Goal 1, OT-IRF)  bathing skills, all;grab bar/tub rail;hand-held shower spray hose;shower chair  -AF      [place] Level (Bathing Goal 1, OT-IRF)  minimum assist (75% or more patient effort);verbal cues required  -AF      Time Frame (Bathing Goal 1, OT-IRF)  short term goal (STG)  -AF      Progress/Outcomes (Bathing Goal 1, OT-IRF)  goal ongoing  -AF         Bathing Goal 2 (OT-IRF)    Activity/Device (Bathing Goal 2, OT-IRF)  bathing skills, all;grab bar/tub rail;hand-held shower spray hose;shower chair  -AF      [place] Level (Bathing Goal 2, OT-IRF)  contact guard assist;verbal cues required  -AF      Time Frame (Bathing Goal 2, OT-IRF)  long term goal (LTG)  -AF      Progress/Outcomes (Bathing Goal 2, OT-IRF)  goal ongoing  -AF         UB Dressing Goal 1 (OT-IRF)    Activity/Device (UB Dressing Goal 1, OT-IRF)  upper body dressing  -AF      [place] (UB Dress Goal 1, OT-IRF)  set-up required  -AF      Time Frame (UB Dressing Goal 1, OT-IRF)  long term goal (LTG)  -AF      Progress/Outcomes (UB Dressing Goal 1, OT-IRF)  goal ongoing  -AF         LB Dressing Goal 1 (OT-IRF)    Activity/Device (LB Dressing Goal 1, OT-IRF)  lower body dressing  -AF      [place] (LB Dressing Goal 1, OT-IRF)  moderate assist (50-74% patient effort);verbal cues required  -AF      Time Frame (LB Dressing Goal 1, OT-IRF)  short term goal (STG)  -AF      Progress/Outcomes (LB Dressing Goal 1, OT-IRF)  goal ongoing  -AF         LB Dressing Goal 2 (OT-IRF)     Activity/Device (LB Dressing Goal 2, OT-IRF)  lower body dressing  -AF      [place] (LB Dressing Goal 2, OT-IRF)  contact guard assist;verbal cues required  -AF      Time Frame (LB Dressing Goal 2, OT-IRF)  long term goal (LTG)  -AF      Progress/Outcomes (LB Dressing Goal 2, OT-IRF)  goal ongoing  -AF         Grooming Goal 1 (OT-IRF)    Activity/Device (Grooming Goal 1, OT-IRF)  grooming skills, all  -AF      [place] (Grooming Goal 1, OT-IRF)  minimum assist (75% or more patient effort);verbal cues required  -AF      Time Frame (Grooming Goal 1, OT-IRF)  short term goal (STG)  -AF      Progress/Outcomes (Grooming Goal 1, OT-IRF)  goal ongoing  -AF         Grooming Goal 2 (OT-IRF)    Activity/Device (Grooming Goal 2, OT-IRF)  grooming skills, all  -AF      [place] (Grooming Goal 2, OT-IRF)  supervision required;verbal cues required  -AF      Time Frame (Grooming Goal 2, OT-IRF)  long term goal (LTG)  -AF      Progress/Outcomes (Grooming Goal 2, OT-IRF)  goal ongoing  -AF         Toileting Goal 1 (OT-IRF)    Activity/Device (Toileting Goal 1, OT-IRF)  toileting skills, all;grab bar/safety frame;raised toilet seat  -AF      [place] Level (Toileting Goal 1, OT-IRF)  moderate assist (50-74% patient effort);verbal cues required  -AF      Time Frame (Toileting Goal 1, OT-IRF)  short term goal (STG)  -AF      Progress/Outcomes (Toileting Goal 1, OT-IRF)  goal ongoing  -AF         Toileting Goal 2 (OT-IRF)    Activity/Device (Toileting Goal 2, OT-IRF)  toileting skills, all;grab bar/safety frame;raised toilet seat  -AF      [place] Level (Toileting Goal 2, OT-IRF)  verbal cues required;contact guard assist  -AF      Time Frame (Toileting Goal 2, OT-IRF)  long term goal (LTG)  -AF      Progress/Outcomes (Toileting Goal 2, OT-IRF)  goal ongoing  -AF         Caregiver Training Goal 1 (OT-IRF)    Caregiver Training Goal 1 (OT-IRF)  Family and patient to demo safe technique with ADLs, transfers, HEP and  adaptive equipment as needed   -AF      Time Frame (Caregiver Training Goal 1, OT-IRF)  long term goal (LTG)  -AF      Progress/Outcomes (Caregiver Training Goal 1, OT-IRF)  goal ongoing  -AF        User Key  (r) = Recorded By, (t) = Taken By, (c) = Cosigned By    Initials Name Provider Type    AF Ingris Ansari OTR Occupational Therapist          Occupational Therapy Education     Title: PT OT SLP Therapies (Not Started)     Topic: Occupational Therapy (Not Started)     Point: ADL training (In Progress)     Description: Instruct learner(s) on proper safety adaptation and remediation techniques during self care or transfers.   Instruct in proper use of assistive devices.    Learning Progress Summary           Patient Nonacceptance, E, NR by AF at 8/13/2019  3:48 PM    Comment:  benefits and purpose of therapy                               User Key     Initials Effective Dates Name Provider Type Discipline    AF 04/03/18 -  Ingris Ansari OTKEVIN Occupational Therapist OT                       Time Calculation:     Time Calculation- OT     Row Name 08/13/19 1548             Time Calculation- OT    OT Start Time  1430  -AF      OT Stop Time  1500  -AF      OT Time Calculation (min)  30 min  -AF        User Key  (r) = Recorded By, (t) = Taken By, (c) = Cosigned By    Initials Name Provider Type    AF Ingris Ansari OTR Occupational Therapist          Therapy Charges for Today     Code Description Service Date Service Provider Modifiers Qty    90178612104 HC OT SELF CARE/MGMT/TRAIN EA 15 MIN 8/12/2019 Ingris Ansari OTR GO 3    99886146587 HC OT THERAPEUTIC ACT EA 15 MIN 8/12/2019 Ingris Ansari OTR GO 1    90667459508 HC OT SELF CARE/MGMT/TRAIN EA 15 MIN 8/13/2019 Ingris Ansari OTR GO 1    90329671306 HC OT THER PROC EA 15 MIN 8/13/2019 Ingris Ansari OTR GO 1                   JOHN Mejia  8/13/2019

## 2025-07-05 NOTE — PLAN OF CARE
Problem: Skin Injury Risk (Adult)  Goal: Skin Health and Integrity  Outcome: Ongoing (interventions implemented as appropriate)      Problem: Fall Risk (Adult)  Goal: Absence of Fall  Outcome: Ongoing (interventions implemented as appropriate)      Problem: Patient Care Overview  Goal: Plan of Care Review  Outcome: Ongoing (interventions implemented as appropriate)   08/27/19 0247   Patient Care Overview   IRF Plan of Care Review progress ongoing, continue   Progress, Functional Goals demonstrating adequate progress   Coping/Psychosocial   Plan of Care Reviewed With patient   OTHER   Outcome Summary Patient is confused. Agitated and restless at the beginning of shift. Holding on to staff and not letting go, attempt to kick and pinch . Unable to redirect. PRN Zyprexa IM administered with positive result. Patient calmed and staff was able to provide care. Continent / incontinent of bladder. Continent of bowel. Voided large amount of urine and postvoid residual :400ml. Toileting offered. Taking her medication in pudding. Refused BS check at HS. Call light placed within reach.        Problem: Stroke (IRF) (Adult)  Goal: Promote Optimal Functional [place]  Outcome: Ongoing (interventions implemented as appropriate)         alert and awake

## (undated) DEVICE — TUBING, SUCTION, 1/4" X 10', STRAIGHT: Brand: MEDLINE

## (undated) DEVICE — EXTENSION SET, MALE LUER LOCK ADAPTER WITH RETRACTABLE COLLAR

## (undated) DEVICE — NDL HYPO PRECISIONGLIDE/REG 30G 1/2 BRN

## (undated) DEVICE — ANTIBACTERIAL UNDYED BRAIDED (POLYGLACTIN 910), SYNTHETIC ABSORBABLE SUTURE: Brand: COATED VICRYL

## (undated) DEVICE — SUT SILK 2/0 TIES 18IN A185H

## (undated) DEVICE — BIPOLAR ELECTROHEMOSTASIS CATHETER: Brand: INJECTION GOLD PROBE

## (undated) DEVICE — Device: Brand: D-STAT® DRY SILVER CLEAR TOPICAL HEMOSTAT

## (undated) DEVICE — DRP SLUSH WARMR MACH 52X66IN OM-ORS-301

## (undated) DEVICE — Device

## (undated) DEVICE — CODMAN® SURGICAL PATTIES 1/2" X 3" (1.27CM X 7.62CM): Brand: CODMAN®

## (undated) DEVICE — GLV SURG SENSICARE MICRO PF LF 8 STRL

## (undated) DEVICE — SHEET, DRAPE, SPLIT, STERILE: Brand: MEDLINE

## (undated) DEVICE — ADAPT CLN BIOGUARD AIR/H2O DISP

## (undated) DEVICE — GOWN,PREVENTION PLUS,XXLARGE,STERILE: Brand: MEDLINE

## (undated) DEVICE — RADIFOCUS GLIDEWIRE: Brand: GLIDEWIRE

## (undated) DEVICE — ELECTRD BLD EDGE/INSUL1P 2.4X5.1MM STRL

## (undated) DEVICE — GLV SURG PREMIERPRO ORTHO LTX PF SZ8.5 BRN

## (undated) DEVICE — CVR PROB 96IN LF STRL

## (undated) DEVICE — SUT PROLN 6/0 BV1 D/A 30IN 8709H

## (undated) DEVICE — PK ANGIO CERBRL RAD 40

## (undated) DEVICE — DISPOSABLE IRRIGATION BIPOLAR CORD, M1000 TYPE: Brand: KIRWAN

## (undated) DEVICE — SUT SILK 4/0 TIES 18IN A183H

## (undated) DEVICE — ST IV PRI VENOSET NV W/CLAMP 72IN

## (undated) DEVICE — KT ORCA ORCAPOD DISP STRL

## (undated) DEVICE — CATHETER,URETHRAL,REDRUBBER,STERILE,22FR: Brand: MEDLINE

## (undated) DEVICE — SMOKE EVACUATION TUBING WITH 7/8 IN TO 1/4 IN REDUCER: Brand: BUFFALO FILTER

## (undated) DEVICE — COPILOT BLEEDBACK CONTROL VALVE: Brand: COPILOT

## (undated) DEVICE — BASN GW RINGMASTER

## (undated) DEVICE — APPL CHLORAPREP W/TINT 26ML ORNG

## (undated) DEVICE — PINNACLE R/O II INTRODUCER SHEATH WITH RADIOPAQUE MARKER: Brand: PINNACLE

## (undated) DEVICE — SOL NACL 0.9PCT 1000ML

## (undated) DEVICE — 3M™ IOBAN™ 2 ANTIMICROBIAL INCISE DRAPE 6640EZ: Brand: IOBAN™ 2

## (undated) DEVICE — CONN TBG Y 5 IN 1 LF STRL

## (undated) DEVICE — GW AMPLTZ SUPERSTIFF STR .035IN 260CM

## (undated) DEVICE — SUT SILK 2/0 SH CR5 18IN C0125

## (undated) DEVICE — RADIFOCUS TORQUE DEVICE MULTI-TORQUE VISE: Brand: RADIFOCUS TORQUE DEVICE

## (undated) DEVICE — SENSR O2 OXIMAX FNGR A/ 18IN NONSTR

## (undated) DEVICE — 3M™ STERI-DRAPE™ INSTRUMENT POUCH 1018: Brand: STERI-DRAPE™

## (undated) DEVICE — 1 ML TUBERCULIN SYRINGE REGULAR TIP: Brand: MONOJECT

## (undated) DEVICE — CONTAINER,SPECIMEN,OR STERILE,4OZ: Brand: MEDLINE

## (undated) DEVICE — LN SMPL CO2 SHTRM SD STREAM W/M LUER

## (undated) DEVICE — TOTAL TRAY, 16FR 10ML SIL FOLEY, URN: Brand: MEDLINE

## (undated) DEVICE — 6MM BASKET, EXTRA SUPPORT,: Brand: ANGIOGUARD

## (undated) DEVICE — GLV SURG SENSICARE PI LF PF 7.5 GRN STRL

## (undated) DEVICE — ERASECAUTI INTERMIT TRAY: Brand: MEDLINE INDUSTRIES, INC.

## (undated) DEVICE — STPCK 3WY HP ROT

## (undated) DEVICE — 0.2 MICRON INTRAVENOUS FILTER FOR 96 HOUR USE WITH MICRO-BORE EXTENSION TUBING AN LUER-LOCK ADAPTER: Brand: PALL POSIDYNE® ELD FILTER

## (undated) DEVICE — NDL HYPO PRECISIONGLIDE REG 25G 1 1/2

## (undated) DEVICE — MYNXGRIP 6F/7F: Brand: MYNXGRIP

## (undated) DEVICE — CANN O2 ETCO2 FITS ALL CONN CO2 SMPL A/ 7IN DISP LF

## (undated) DEVICE — SHLD ANGIO 2LAYR CIR FEN

## (undated) DEVICE — TOWEL,OR,DSP,ST,BLUE,STD,4/PK,20PK/CS: Brand: MEDLINE

## (undated) DEVICE — TBG ART PRESS 60 IN

## (undated) DEVICE — TP UMB COTN RO 1/8X24IN U16G

## (undated) DEVICE — SUT MNCRYL PLS ANTIB UD 4/0 PS2 18IN

## (undated) DEVICE — 2 TIP STRAIGHT MICROCATHETER: Brand: EXCELSIOR SL-10

## (undated) DEVICE — CODMAN® SURGICAL PATTIES 1" X 3" (2.54CM X 7.62CM): Brand: CODMAN®

## (undated) DEVICE — BITEBLOCK OMNI BLOC

## (undated) DEVICE — STANDARD GUIDEWIRE WITH HYDROPHILIC COATING: Brand: SYNCHRO 2

## (undated) DEVICE — PRESSURE MONITORING SET: Brand: TRUWAVE

## (undated) DEVICE — DEV ANGIO FLOSWITCH HP BX24

## (undated) DEVICE — THE CARR-LOCKE INJECTION NEEDLE IS A SINGLE USE, DISPOSABLE, FLEXIBLE SHEATH INJECTION NEEDLE USED FOR THE INJECTION OF VARIOUS TYPES OF MEDIA THROUGH FLEXIBLE ENDOSCOPES.

## (undated) DEVICE — GLV SURG BIOGEL LTX PF 7 1/2

## (undated) DEVICE — SOL NS 500ML

## (undated) DEVICE — DESTINATION CAROTID GUIDING SHEATH: Brand: DESTINATION

## (undated) DEVICE — ADHS SKIN DERMABOND TOP ADVANCED

## (undated) DEVICE — KT NEURO CUST

## (undated) DEVICE — SYR LL 3CC